# Patient Record
Sex: MALE | Race: WHITE | NOT HISPANIC OR LATINO
[De-identification: names, ages, dates, MRNs, and addresses within clinical notes are randomized per-mention and may not be internally consistent; named-entity substitution may affect disease eponyms.]

---

## 2017-03-09 ENCOUNTER — APPOINTMENT (OUTPATIENT)
Dept: UROLOGY | Facility: CLINIC | Age: 77
End: 2017-03-09

## 2017-10-31 ENCOUNTER — APPOINTMENT (OUTPATIENT)
Dept: UROLOGY | Facility: CLINIC | Age: 77
End: 2017-10-31
Payer: MEDICARE

## 2017-10-31 DIAGNOSIS — N52.9 MALE ERECTILE DYSFUNCTION, UNSPECIFIED: ICD-10-CM

## 2017-10-31 DIAGNOSIS — N40.1 BENIGN PROSTATIC HYPERPLASIA WITH LOWER URINARY TRACT SYMPMS: ICD-10-CM

## 2017-10-31 DIAGNOSIS — N13.8 BENIGN PROSTATIC HYPERPLASIA WITH LOWER URINARY TRACT SYMPMS: ICD-10-CM

## 2017-10-31 DIAGNOSIS — C65.9 MALIGNANT NEOPLASM OF UNSPECIFIED RENAL PELVIS: ICD-10-CM

## 2017-10-31 DIAGNOSIS — C67.9 MALIGNANT NEOPLASM OF BLADDER, UNSPECIFIED: ICD-10-CM

## 2017-10-31 PROCEDURE — 99214 OFFICE O/P EST MOD 30 MIN: CPT

## 2017-11-01 LAB
APPEARANCE: CLEAR
BACTERIA: NEGATIVE
BILIRUBIN URINE: NEGATIVE
BLOOD URINE: NEGATIVE
COLOR: YELLOW
GLUCOSE QUALITATIVE U: NEGATIVE MG/DL
KETONES URINE: NEGATIVE
LEUKOCYTE ESTERASE URINE: NEGATIVE
MICROSCOPIC-UA: NORMAL
NITRITE URINE: NEGATIVE
PH URINE: 6.5
PROTEIN URINE: NEGATIVE MG/DL
PSA FREE FLD-MCNC: 50
PSA FREE SERPL-MCNC: 0.2 NG/ML
PSA SERPL-MCNC: 0.4 NG/ML
RED BLOOD CELLS URINE: 2 /HPF
SPECIFIC GRAVITY URINE: 1.01
SQUAMOUS EPITHELIAL CELLS: 0 /HPF
UROBILINOGEN URINE: NEGATIVE MG/DL
WHITE BLOOD CELLS URINE: 0 /HPF

## 2017-11-03 LAB — CORE LAB FLUID CYTOLOGY: NORMAL

## 2017-11-30 ENCOUNTER — APPOINTMENT (OUTPATIENT)
Dept: VASCULAR SURGERY | Facility: CLINIC | Age: 77
End: 2017-11-30
Payer: MEDICARE

## 2017-11-30 VITALS
WEIGHT: 162 LBS | TEMPERATURE: 98 F | HEART RATE: 51 BPM | HEIGHT: 67.5 IN | DIASTOLIC BLOOD PRESSURE: 85 MMHG | SYSTOLIC BLOOD PRESSURE: 146 MMHG | BODY MASS INDEX: 25.13 KG/M2

## 2017-11-30 DIAGNOSIS — Z87.891 PERSONAL HISTORY OF NICOTINE DEPENDENCE: ICD-10-CM

## 2017-11-30 PROCEDURE — 99204 OFFICE O/P NEW MOD 45 MIN: CPT

## 2017-12-04 ENCOUNTER — FORM ENCOUNTER (OUTPATIENT)
Age: 77
End: 2017-12-04

## 2017-12-05 ENCOUNTER — OUTPATIENT (OUTPATIENT)
Dept: OUTPATIENT SERVICES | Facility: HOSPITAL | Age: 77
LOS: 1 days | End: 2017-12-05
Payer: MEDICARE

## 2017-12-05 ENCOUNTER — APPOINTMENT (OUTPATIENT)
Dept: CT IMAGING | Facility: CLINIC | Age: 77
End: 2017-12-05
Payer: MEDICARE

## 2017-12-05 DIAGNOSIS — Z00.8 ENCOUNTER FOR OTHER GENERAL EXAMINATION: ICD-10-CM

## 2017-12-05 DIAGNOSIS — Z98.89 OTHER SPECIFIED POSTPROCEDURAL STATES: Chronic | ICD-10-CM

## 2017-12-05 PROCEDURE — 74174 CTA ABD&PLVS W/CONTRAST: CPT | Mod: 26

## 2017-12-05 PROCEDURE — 74174 CTA ABD&PLVS W/CONTRAST: CPT

## 2017-12-06 ENCOUNTER — OTHER (OUTPATIENT)
Age: 77
End: 2017-12-06

## 2018-01-22 ENCOUNTER — OUTPATIENT (OUTPATIENT)
Dept: OUTPATIENT SERVICES | Facility: HOSPITAL | Age: 78
LOS: 1 days | End: 2018-01-22
Payer: MEDICARE

## 2018-01-22 VITALS
HEART RATE: 65 BPM | OXYGEN SATURATION: 99 % | RESPIRATION RATE: 15 BRPM | SYSTOLIC BLOOD PRESSURE: 132 MMHG | TEMPERATURE: 98 F | WEIGHT: 161.16 LBS | DIASTOLIC BLOOD PRESSURE: 83 MMHG | HEIGHT: 67.5 IN

## 2018-01-22 DIAGNOSIS — G47.33 OBSTRUCTIVE SLEEP APNEA (ADULT) (PEDIATRIC): ICD-10-CM

## 2018-01-22 DIAGNOSIS — Z98.89 OTHER SPECIFIED POSTPROCEDURAL STATES: Chronic | ICD-10-CM

## 2018-01-22 DIAGNOSIS — I71.4 ABDOMINAL AORTIC ANEURYSM, WITHOUT RUPTURE: ICD-10-CM

## 2018-01-22 DIAGNOSIS — Z79.82 LONG TERM (CURRENT) USE OF ASPIRIN: ICD-10-CM

## 2018-01-22 DIAGNOSIS — Z01.818 ENCOUNTER FOR OTHER PREPROCEDURAL EXAMINATION: ICD-10-CM

## 2018-01-22 LAB
ANION GAP SERPL CALC-SCNC: 13 MMOL/L — SIGNIFICANT CHANGE UP (ref 5–17)
BLD GP AB SCN SERPL QL: NEGATIVE — SIGNIFICANT CHANGE UP
BUN SERPL-MCNC: 24 MG/DL — HIGH (ref 7–23)
CALCIUM SERPL-MCNC: 10.1 MG/DL — SIGNIFICANT CHANGE UP (ref 8.4–10.5)
CHLORIDE SERPL-SCNC: 99 MMOL/L — SIGNIFICANT CHANGE UP (ref 96–108)
CO2 SERPL-SCNC: 27 MMOL/L — SIGNIFICANT CHANGE UP (ref 22–31)
CREAT SERPL-MCNC: 1.35 MG/DL — HIGH (ref 0.5–1.3)
GLUCOSE SERPL-MCNC: 96 MG/DL — SIGNIFICANT CHANGE UP (ref 70–99)
HCT VFR BLD CALC: 42.4 % — SIGNIFICANT CHANGE UP (ref 39–50)
HGB BLD-MCNC: 14.3 G/DL — SIGNIFICANT CHANGE UP (ref 13–17)
MCHC RBC-ENTMCNC: 31.7 PG — SIGNIFICANT CHANGE UP (ref 27–34)
MCHC RBC-ENTMCNC: 33.7 GM/DL — SIGNIFICANT CHANGE UP (ref 32–36)
MCV RBC AUTO: 94 FL — SIGNIFICANT CHANGE UP (ref 80–100)
PLATELET # BLD AUTO: 218 K/UL — SIGNIFICANT CHANGE UP (ref 150–400)
POTASSIUM SERPL-MCNC: 4.8 MMOL/L — SIGNIFICANT CHANGE UP (ref 3.5–5.3)
POTASSIUM SERPL-SCNC: 4.8 MMOL/L — SIGNIFICANT CHANGE UP (ref 3.5–5.3)
RBC # BLD: 4.51 M/UL — SIGNIFICANT CHANGE UP (ref 4.2–5.8)
RBC # FLD: 13 % — SIGNIFICANT CHANGE UP (ref 10.3–14.5)
RH IG SCN BLD-IMP: POSITIVE — SIGNIFICANT CHANGE UP
SODIUM SERPL-SCNC: 139 MMOL/L — SIGNIFICANT CHANGE UP (ref 135–145)
WBC # BLD: 8.78 K/UL — SIGNIFICANT CHANGE UP (ref 3.8–10.5)
WBC # FLD AUTO: 8.78 K/UL — SIGNIFICANT CHANGE UP (ref 3.8–10.5)

## 2018-01-22 PROCEDURE — 86850 RBC ANTIBODY SCREEN: CPT

## 2018-01-22 PROCEDURE — 86901 BLOOD TYPING SEROLOGIC RH(D): CPT

## 2018-01-22 PROCEDURE — 93005 ELECTROCARDIOGRAM TRACING: CPT

## 2018-01-22 PROCEDURE — 85027 COMPLETE CBC AUTOMATED: CPT

## 2018-01-22 PROCEDURE — G0463: CPT

## 2018-01-22 PROCEDURE — 93010 ELECTROCARDIOGRAM REPORT: CPT

## 2018-01-22 PROCEDURE — 80048 BASIC METABOLIC PNL TOTAL CA: CPT

## 2018-01-22 PROCEDURE — 86900 BLOOD TYPING SEROLOGIC ABO: CPT

## 2018-01-22 NOTE — H&P PST ADULT - NSANTHOSAYNRD_GEN_A_CORE
No. PATSY screening performed.  STOP BANG Legend: 0-2 = LOW Risk; 3-4 = INTERMEDIATE Risk; 5-8 = HIGH Risk

## 2018-01-22 NOTE — H&P PST ADULT - PSH
History of cystoscopy  April 2015  History of Lt  Nephrectomy  6/10 - left  S/P Cystoscopy  bladder polyps removal multiple times (since 1999), 6/10 , 10/2011 & 10/17/2011, 5/12, 11/2012, last 5/13/13, 12/2013, 7/2014  S/P Tonsillectomy    urethral Stent 7/2008  stent placement and removal

## 2018-01-22 NOTE — H&P PST ADULT - PMH
Abdominal aortic aneurysm (AAA)    Bladder Cancer (ICD9 188.9)  TCC, dx 1999  BPH (benign prostatic hyperplasia)    Heel spur, left    HTN  dx 2008  Hx antineoplastic chemotherapy (BCG 2012)    Hyperlipemia (ICD9 272.4)  dx 2008  Left bundle branch block    Lt Renal Neoplasm  left  Vertigo Abdominal aortic aneurysm (AAA)  5.5 cm  Bladder Cancer (ICD9 188.9)  TCC, dx 1999  BPH (benign prostatic hyperplasia)    Heel spur, left    HTN  dx 2008  Hx antineoplastic chemotherapy (BCG 2012)    Hyperlipemia (ICD9 272.4)  dx 2008  Left bundle branch block    Lt Renal Neoplasm  left - s/p left nephrectomy  Renal mass, right  current - following with Dr Pamela Gaffney

## 2018-01-22 NOTE — H&P PST ADULT - HISTORY OF PRESENT ILLNESS
76 yo male with h/o HTN, HLD, renal neoplasm s/p left nephrectomy and known AAA, which increased in size from approximately 4.5 cm 9 months ago to 5.5 cm. There was an incidental finding of a right renal neoplasm on imaging done to evaluate AAA. Pt is scheduled for endovascular repair of AAA on 1/29/18. He is following up with Dr Santiago for evaluation of right renal mass. Pt denies back or abdominal pain.

## 2018-01-28 ENCOUNTER — OUTPATIENT (OUTPATIENT)
Dept: OUTPATIENT SERVICES | Facility: HOSPITAL | Age: 78
LOS: 1 days | End: 2018-01-28
Payer: MEDICARE

## 2018-01-28 DIAGNOSIS — Z98.89 OTHER SPECIFIED POSTPROCEDURAL STATES: Chronic | ICD-10-CM

## 2018-01-29 ENCOUNTER — APPOINTMENT (OUTPATIENT)
Dept: VASCULAR SURGERY | Facility: HOSPITAL | Age: 78
End: 2018-01-29

## 2018-01-29 ENCOUNTER — TRANSCRIPTION ENCOUNTER (OUTPATIENT)
Age: 78
End: 2018-01-29

## 2018-01-29 ENCOUNTER — INPATIENT (INPATIENT)
Facility: HOSPITAL | Age: 78
LOS: 1 days | Discharge: HOME | DRG: 269 | End: 2018-01-31
Attending: SURGERY | Admitting: SURGERY
Payer: MEDICARE

## 2018-01-29 VITALS
HEIGHT: 67.5 IN | SYSTOLIC BLOOD PRESSURE: 147 MMHG | TEMPERATURE: 98 F | WEIGHT: 160.94 LBS | OXYGEN SATURATION: 98 % | RESPIRATION RATE: 18 BRPM | DIASTOLIC BLOOD PRESSURE: 80 MMHG | HEART RATE: 57 BPM

## 2018-01-29 DIAGNOSIS — I71.4 ABDOMINAL AORTIC ANEURYSM, WITHOUT RUPTURE: ICD-10-CM

## 2018-01-29 DIAGNOSIS — Z98.89 OTHER SPECIFIED POSTPROCEDURAL STATES: Chronic | ICD-10-CM

## 2018-01-29 LAB
HCT VFR BLD CALC: 32.2 % — LOW (ref 39–50)
HGB BLD-MCNC: 11.8 G/DL — LOW (ref 13–17)
MCHC RBC-ENTMCNC: 34.4 PG — HIGH (ref 27–34)
MCHC RBC-ENTMCNC: 36.7 GM/DL — HIGH (ref 32–36)
MCV RBC AUTO: 93.8 FL — SIGNIFICANT CHANGE UP (ref 80–100)
PLATELET # BLD AUTO: 156 K/UL — SIGNIFICANT CHANGE UP (ref 150–400)
RBC # BLD: 3.43 M/UL — LOW (ref 4.2–5.8)
RBC # FLD: 11.3 % — SIGNIFICANT CHANGE UP (ref 10.3–14.5)
WBC # BLD: 9.3 K/UL — SIGNIFICANT CHANGE UP (ref 3.8–10.5)
WBC # FLD AUTO: 9.3 K/UL — SIGNIFICANT CHANGE UP (ref 3.8–10.5)

## 2018-01-29 PROCEDURE — 34709 PLMT XTN PROSTH EVASC RPR: CPT

## 2018-01-29 PROCEDURE — 34812 OPN FEM ART EXPOS: CPT | Mod: 59

## 2018-01-29 PROCEDURE — 37221: CPT | Mod: 59

## 2018-01-29 PROCEDURE — 34705 EVAC RPR A-BIILIAC NDGFT: CPT

## 2018-01-29 RX ORDER — SODIUM CHLORIDE 9 MG/ML
3 INJECTION INTRAMUSCULAR; INTRAVENOUS; SUBCUTANEOUS EVERY 8 HOURS
Qty: 0 | Refills: 0 | Status: DISCONTINUED | OUTPATIENT
Start: 2018-01-29 | End: 2018-01-29

## 2018-01-29 RX ORDER — ONDANSETRON 8 MG/1
4 TABLET, FILM COATED ORAL
Qty: 0 | Refills: 0 | Status: DISCONTINUED | OUTPATIENT
Start: 2018-01-29 | End: 2018-01-30

## 2018-01-29 RX ORDER — LIDOCAINE HCL 20 MG/ML
0.2 VIAL (ML) INJECTION ONCE
Qty: 0 | Refills: 0 | Status: DISCONTINUED | OUTPATIENT
Start: 2018-01-29 | End: 2018-01-29

## 2018-01-29 RX ORDER — METOPROLOL TARTRATE 50 MG
100 TABLET ORAL
Qty: 0 | Refills: 0 | Status: DISCONTINUED | OUTPATIENT
Start: 2018-01-29 | End: 2018-01-31

## 2018-01-29 RX ORDER — ESCITALOPRAM OXALATE 10 MG/1
10 TABLET, FILM COATED ORAL DAILY
Qty: 0 | Refills: 0 | Status: DISCONTINUED | OUTPATIENT
Start: 2018-01-29 | End: 2018-01-31

## 2018-01-29 RX ORDER — HYDROMORPHONE HYDROCHLORIDE 2 MG/ML
0.5 INJECTION INTRAMUSCULAR; INTRAVENOUS; SUBCUTANEOUS
Qty: 0 | Refills: 0 | Status: DISCONTINUED | OUTPATIENT
Start: 2018-01-29 | End: 2018-01-30

## 2018-01-29 RX ORDER — ACETAMINOPHEN 500 MG
1000 TABLET ORAL ONCE
Qty: 0 | Refills: 0 | Status: COMPLETED | OUTPATIENT
Start: 2018-01-29 | End: 2018-01-29

## 2018-01-29 RX ORDER — LOSARTAN POTASSIUM 100 MG/1
100 TABLET, FILM COATED ORAL DAILY
Qty: 0 | Refills: 0 | Status: DISCONTINUED | OUTPATIENT
Start: 2018-01-29 | End: 2018-01-31

## 2018-01-29 RX ORDER — HEPARIN SODIUM 5000 [USP'U]/ML
5000 INJECTION INTRAVENOUS; SUBCUTANEOUS EVERY 8 HOURS
Qty: 0 | Refills: 0 | Status: DISCONTINUED | OUTPATIENT
Start: 2018-01-29 | End: 2018-01-31

## 2018-01-29 RX ORDER — ACETAMINOPHEN 500 MG
1000 TABLET ORAL ONCE
Qty: 0 | Refills: 0 | Status: COMPLETED | OUTPATIENT
Start: 2018-01-29 | End: 2018-01-30

## 2018-01-29 RX ORDER — OXYCODONE AND ACETAMINOPHEN 5; 325 MG/1; MG/1
1 TABLET ORAL EVERY 4 HOURS
Qty: 0 | Refills: 0 | Status: DISCONTINUED | OUTPATIENT
Start: 2018-01-29 | End: 2018-01-31

## 2018-01-29 RX ORDER — ATORVASTATIN CALCIUM 80 MG/1
20 TABLET, FILM COATED ORAL AT BEDTIME
Qty: 0 | Refills: 0 | Status: DISCONTINUED | OUTPATIENT
Start: 2018-01-29 | End: 2018-01-31

## 2018-01-29 RX ORDER — DEXTROSE MONOHYDRATE, SODIUM CHLORIDE, AND POTASSIUM CHLORIDE 50; .745; 4.5 G/1000ML; G/1000ML; G/1000ML
1000 INJECTION, SOLUTION INTRAVENOUS
Qty: 0 | Refills: 0 | Status: DISCONTINUED | OUTPATIENT
Start: 2018-01-29 | End: 2018-01-31

## 2018-01-29 RX ADMIN — DEXTROSE MONOHYDRATE, SODIUM CHLORIDE, AND POTASSIUM CHLORIDE 80 MILLILITER(S): 50; .745; 4.5 INJECTION, SOLUTION INTRAVENOUS at 17:30

## 2018-01-29 RX ADMIN — Medication 1000 MILLIGRAM(S): at 16:30

## 2018-01-29 RX ADMIN — HYDROMORPHONE HYDROCHLORIDE 0.5 MILLIGRAM(S): 2 INJECTION INTRAMUSCULAR; INTRAVENOUS; SUBCUTANEOUS at 15:04

## 2018-01-29 RX ADMIN — HYDROMORPHONE HYDROCHLORIDE 0.5 MILLIGRAM(S): 2 INJECTION INTRAMUSCULAR; INTRAVENOUS; SUBCUTANEOUS at 18:00

## 2018-01-29 RX ADMIN — ATORVASTATIN CALCIUM 20 MILLIGRAM(S): 80 TABLET, FILM COATED ORAL at 22:08

## 2018-01-29 RX ADMIN — HYDROMORPHONE HYDROCHLORIDE 0.5 MILLIGRAM(S): 2 INJECTION INTRAMUSCULAR; INTRAVENOUS; SUBCUTANEOUS at 18:30

## 2018-01-29 RX ADMIN — ONDANSETRON 4 MILLIGRAM(S): 8 TABLET, FILM COATED ORAL at 22:37

## 2018-01-29 RX ADMIN — Medication 400 MILLIGRAM(S): at 15:59

## 2018-01-29 RX ADMIN — HEPARIN SODIUM 5000 UNIT(S): 5000 INJECTION INTRAVENOUS; SUBCUTANEOUS at 22:08

## 2018-01-29 RX ADMIN — HYDROMORPHONE HYDROCHLORIDE 0.5 MILLIGRAM(S): 2 INJECTION INTRAMUSCULAR; INTRAVENOUS; SUBCUTANEOUS at 20:29

## 2018-01-29 RX ADMIN — HYDROMORPHONE HYDROCHLORIDE 0.5 MILLIGRAM(S): 2 INJECTION INTRAMUSCULAR; INTRAVENOUS; SUBCUTANEOUS at 15:34

## 2018-01-29 NOTE — CHART NOTE - NSCHARTNOTEFT_GEN_A_CORE
Vascular Surgery 6 hour Re-eval    Procedure: endovascular AAA repair    SUBJECTIVE: Pt seen and examined in PACU. Continues to endorse soreness, which improves with pain medications, however states that he has nausea and emesis 2/2 iv pain meds.  SOB:  [ ] YES [x ] NO  Chest Discomfort: [ ] YES [x ] NO    Nausea: [x ] YES [ ] NO           Vomiting: [x ] YES [ ] NO  Flatus: [ ] YES [x ] NO             Bowel Movement: [ ] YES [x ] NO  Void: [x ]YES [ ]No         Pain Control Adequate: [ x] YES [ ] NO        Vital Signs Last 24 Hrs  T(C): 36.3 (29 Jan 2018 16:30), Max: 37 (29 Jan 2018 14:30)  T(F): 97.3 (29 Jan 2018 16:30), Max: 98.6 (29 Jan 2018 14:30)  HR: 67 (29 Jan 2018 20:30) (57 - 79)  BP: 116/61 (29 Jan 2018 20:00) (87/54 - 147/80)  BP(mean): 81 (29 Jan 2018 20:00) (63 - 86)  RR: 10 (29 Jan 2018 20:30) (9 - 22)  SpO2: 99% (29 Jan 2018 20:30) (92% - 100%)  I&O's Summary    29 Jan 2018 07:01  -  29 Jan 2018 21:32  --------------------------------------------------------  IN: 550 mL / OUT: 440 mL / NET: 110 mL      I&O's Detail    29 Jan 2018 07:01  -  29 Jan 2018 21:32  --------------------------------------------------------  IN:    Oral Fluid: 150 mL    sodium chloride 0.9% with potassium chloride 20 mEq/L: 400 mL  Total IN: 550 mL    OUT:    Emesis: 50 mL    Indwelling Catheter - Urethral: 390 mL  Total OUT: 440 mL    Total NET: 110 mL          MEDICATIONS  (STANDING):  atorvastatin 20 milliGRAM(s) Oral at bedtime  clindamycin IVPB 900 milliGRAM(s) IV Intermittent once  escitalopram 10 milliGRAM(s) Oral daily  heparin  Injectable 5000 Unit(s) SubCutaneous every 8 hours  losartan 100 milliGRAM(s) Oral daily  metoprolol     tartrate 100 milliGRAM(s) Oral two times a day  sodium chloride 0.9% with potassium chloride 20 mEq/L 1000 milliLiter(s) (80 mL/Hr) IV Continuous <Continuous>    MEDICATIONS  (PRN):  HYDROmorphone  Injectable 0.5 milliGRAM(s) IV Push every 10 minutes PRN Moderate Pain (4 - 6)  ondansetron Injectable 4 milliGRAM(s) IV Push two times a day PRN Nausea and/or Vomiting  oxyCODONE    5 mG/acetaminophen 325 mG 1 Tablet(s) Oral every 4 hours PRN Moderate Pain      LABS:                        11.8   9.3   )-----------( 156      ( 29 Jan 2018 15:40 )             32.2           Physical exam  General: NAD, A& O X3  Abdomen: soft, NT, ND  b/l groin: soft, no hematoma or active bleeding, dressings unchanged saturated serosanguinous  Ext: warm, soft compartments, sensation/motor intact, dopp R DP, dopp L PT      A/P: 77y Male POD 0 s/p EVAR  - zofran prn  - Diet: regular  - Activity- OOB ambulate   - AM labs   - Pain medication  - DVT ppx  - incentive spirometry   - OOB2C on POD 1  - keep dressings in place for 24 hours  - rafale d/daily after 24 hours  - ok to transfer from PACU to floor

## 2018-01-29 NOTE — PATIENT PROFILE ADULT. - PMH
Abdominal aortic aneurysm (AAA)  5.5 cm  Bladder Cancer (ICD9 188.9)  TCC, dx 1999  BPH (benign prostatic hyperplasia)    Heel spur, left    HTN  dx 2008  Hx antineoplastic chemotherapy (BCG 2012)    Hyperlipemia (ICD9 272.4)  dx 2008  Left bundle branch block    Lt Renal Neoplasm  left - s/p left nephrectomy  Renal mass, right  current - following with Dr Pamela Gaffney

## 2018-01-29 NOTE — CHART NOTE - NSCHARTNOTEFT_GEN_A_CORE
Vascular Surgery Post op Note    Procedure: endovascular AAA repair    SUBJECTIVE: Pt seen and examined at bedside in PACU.  Pt admits to soreness at b/l groin incision sites. No other complaints.  SOB:  [ ] YES [x ] NO  Chest Discomfort: [ ] YES [x ] NO    Nausea: [ ] YES [x] NO           Vomiting: [ ] YES [x ] NO  Flatus: [ ] YES [x ] NO             Bowel Movement: [ ] YES [ x] NO  Void: [ x]YES [ ]No         Pain Control Adequate: [x ] YES [ ] NO        Vital Signs Last 24 Hrs  T(C): 36.3 (29 Jan 2018 16:30), Max: 37 (29 Jan 2018 14:30)  T(F): 97.3 (29 Jan 2018 16:30), Max: 98.6 (29 Jan 2018 14:30)  HR: 72 (29 Jan 2018 18:30) (57 - 72)  BP: 140/67 (29 Jan 2018 18:30) (87/54 - 147/80)  BP(mean): 63 (29 Jan 2018 15:30) (63 - 86)  RR: 14 (29 Jan 2018 18:30) (12 - 18)  SpO2: 95% (29 Jan 2018 18:30) (95% - 100%)  I&O's Summary    29 Jan 2018 07:01  -  29 Jan 2018 19:22  --------------------------------------------------------  IN: 320 mL / OUT: 390 mL / NET: -70 mL      I&O's Detail    29 Jan 2018 07:01  -  29 Jan 2018 19:22  --------------------------------------------------------  IN:    sodium chloride 0.9% with potassium chloride 20 mEq/L: 320 mL  Total IN: 320 mL    OUT:    Indwelling Catheter - Urethral: 390 mL  Total OUT: 390 mL    Total NET: -70 mL          MEDICATIONS  (STANDING):  atorvastatin 20 milliGRAM(s) Oral at bedtime  clindamycin IVPB 900 milliGRAM(s) IV Intermittent once  escitalopram 10 milliGRAM(s) Oral daily  heparin  Injectable 5000 Unit(s) SubCutaneous every 8 hours  losartan 100 milliGRAM(s) Oral daily  metoprolol     tartrate 100 milliGRAM(s) Oral two times a day  sodium chloride 0.9% with potassium chloride 20 mEq/L 1000 milliLiter(s) (80 mL/Hr) IV Continuous <Continuous>    MEDICATIONS  (PRN):  HYDROmorphone  Injectable 0.5 milliGRAM(s) IV Push every 10 minutes PRN Moderate Pain (4 - 6)  ondansetron Injectable 4 milliGRAM(s) IV Push two times a day PRN Nausea and/or Vomiting  oxyCODONE    5 mG/acetaminophen 325 mG 1 Tablet(s) Oral every 4 hours PRN Moderate Pain      LABS:                        11.8   9.3   )-----------( 156      ( 29 Jan 2018 15:40 )             32.2             Physical exam  General: NAD, A& O X3  Abdomen: soft, NT, ND  b/l groin: soft, no hematoma or active bleeding, dressings saturated serosanguinous  Ext: warm, soft compartments, sensation/motor intact, dopp R DP, faint dopp L PT      A/P: 77y Male POD 0 s/p endovascular AAA repair  - Diet: regular  - Activity- OOB ambulate   - AM labs  - Pain medication  - DVT ppx  - incentive spirometry   - OOB2C on POD 1  - keep dressings in place for 24 hours  - hall d/c after 24 hours  - re-eval in PACU in 2 hours

## 2018-01-30 ENCOUNTER — TRANSCRIPTION ENCOUNTER (OUTPATIENT)
Age: 78
End: 2018-01-30

## 2018-01-30 LAB
ANION GAP SERPL CALC-SCNC: 10 MMOL/L — SIGNIFICANT CHANGE UP (ref 5–17)
BASOPHILS # BLD AUTO: 0 K/UL — SIGNIFICANT CHANGE UP (ref 0–0.2)
BASOPHILS NFR BLD AUTO: 0.1 % — SIGNIFICANT CHANGE UP (ref 0–2)
BUN SERPL-MCNC: 22 MG/DL — SIGNIFICANT CHANGE UP (ref 7–23)
CALCIUM SERPL-MCNC: 8.3 MG/DL — LOW (ref 8.4–10.5)
CHLORIDE SERPL-SCNC: 107 MMOL/L — SIGNIFICANT CHANGE UP (ref 96–108)
CO2 SERPL-SCNC: 23 MMOL/L — SIGNIFICANT CHANGE UP (ref 22–31)
CREAT SERPL-MCNC: 1.22 MG/DL — SIGNIFICANT CHANGE UP (ref 0.5–1.3)
EOSINOPHIL # BLD AUTO: 0 K/UL — SIGNIFICANT CHANGE UP (ref 0–0.5)
EOSINOPHIL NFR BLD AUTO: 0.2 % — SIGNIFICANT CHANGE UP (ref 0–6)
GLUCOSE SERPL-MCNC: 129 MG/DL — HIGH (ref 70–99)
HCT VFR BLD CALC: 31.5 % — LOW (ref 39–50)
HGB BLD-MCNC: 11.7 G/DL — LOW (ref 13–17)
LYMPHOCYTES # BLD AUTO: 0.9 K/UL — LOW (ref 1–3.3)
LYMPHOCYTES # BLD AUTO: 8.6 % — LOW (ref 13–44)
MCHC RBC-ENTMCNC: 34.7 PG — HIGH (ref 27–34)
MCHC RBC-ENTMCNC: 37 GM/DL — HIGH (ref 32–36)
MCV RBC AUTO: 93.8 FL — SIGNIFICANT CHANGE UP (ref 80–100)
MONOCYTES # BLD AUTO: 0.9 K/UL — SIGNIFICANT CHANGE UP (ref 0–0.9)
MONOCYTES NFR BLD AUTO: 8.8 % — SIGNIFICANT CHANGE UP (ref 2–14)
NEUTROPHILS # BLD AUTO: 8.3 K/UL — HIGH (ref 1.8–7.4)
NEUTROPHILS NFR BLD AUTO: 82.4 % — HIGH (ref 43–77)
PLATELET # BLD AUTO: 163 K/UL — SIGNIFICANT CHANGE UP (ref 150–400)
POTASSIUM SERPL-MCNC: 4.8 MMOL/L — SIGNIFICANT CHANGE UP (ref 3.5–5.3)
POTASSIUM SERPL-SCNC: 4.8 MMOL/L — SIGNIFICANT CHANGE UP (ref 3.5–5.3)
RBC # BLD: 3.36 M/UL — LOW (ref 4.2–5.8)
RBC # FLD: 11.3 % — SIGNIFICANT CHANGE UP (ref 10.3–14.5)
SODIUM SERPL-SCNC: 140 MMOL/L — SIGNIFICANT CHANGE UP (ref 135–145)
WBC # BLD: 10.1 K/UL — SIGNIFICANT CHANGE UP (ref 3.8–10.5)
WBC # FLD AUTO: 10.1 K/UL — SIGNIFICANT CHANGE UP (ref 3.8–10.5)

## 2018-01-30 RX ORDER — OXYCODONE AND ACETAMINOPHEN 5; 325 MG/1; MG/1
1 TABLET ORAL ONCE
Qty: 0 | Refills: 0 | Status: DISCONTINUED | OUTPATIENT
Start: 2018-01-30 | End: 2018-01-30

## 2018-01-30 RX ORDER — SENNA PLUS 8.6 MG/1
2 TABLET ORAL ONCE
Qty: 0 | Refills: 0 | Status: COMPLETED | OUTPATIENT
Start: 2018-01-30 | End: 2018-01-30

## 2018-01-30 RX ORDER — DIAZEPAM 5 MG
10 TABLET ORAL
Qty: 0 | Refills: 0 | Status: DISCONTINUED | OUTPATIENT
Start: 2018-01-30 | End: 2018-01-31

## 2018-01-30 RX ORDER — SODIUM CHLORIDE 9 MG/ML
1000 INJECTION INTRAMUSCULAR; INTRAVENOUS; SUBCUTANEOUS ONCE
Qty: 0 | Refills: 0 | Status: COMPLETED | OUTPATIENT
Start: 2018-01-30 | End: 2018-01-30

## 2018-01-30 RX ADMIN — HEPARIN SODIUM 5000 UNIT(S): 5000 INJECTION INTRAVENOUS; SUBCUTANEOUS at 13:01

## 2018-01-30 RX ADMIN — DEXTROSE MONOHYDRATE, SODIUM CHLORIDE, AND POTASSIUM CHLORIDE 80 MILLILITER(S): 50; .745; 4.5 INJECTION, SOLUTION INTRAVENOUS at 15:34

## 2018-01-30 RX ADMIN — OXYCODONE AND ACETAMINOPHEN 1 TABLET(S): 5; 325 TABLET ORAL at 13:30

## 2018-01-30 RX ADMIN — HEPARIN SODIUM 5000 UNIT(S): 5000 INJECTION INTRAVENOUS; SUBCUTANEOUS at 05:10

## 2018-01-30 RX ADMIN — OXYCODONE AND ACETAMINOPHEN 1 TABLET(S): 5; 325 TABLET ORAL at 22:56

## 2018-01-30 RX ADMIN — Medication 1000 MILLIGRAM(S): at 02:15

## 2018-01-30 RX ADMIN — ESCITALOPRAM OXALATE 10 MILLIGRAM(S): 10 TABLET, FILM COATED ORAL at 12:57

## 2018-01-30 RX ADMIN — HEPARIN SODIUM 5000 UNIT(S): 5000 INJECTION INTRAVENOUS; SUBCUTANEOUS at 21:43

## 2018-01-30 RX ADMIN — Medication 10 MILLIGRAM(S): at 18:03

## 2018-01-30 RX ADMIN — ATORVASTATIN CALCIUM 20 MILLIGRAM(S): 80 TABLET, FILM COATED ORAL at 21:43

## 2018-01-30 RX ADMIN — SODIUM CHLORIDE 1000 MILLILITER(S): 9 INJECTION INTRAMUSCULAR; INTRAVENOUS; SUBCUTANEOUS at 18:48

## 2018-01-30 RX ADMIN — DEXTROSE MONOHYDRATE, SODIUM CHLORIDE, AND POTASSIUM CHLORIDE 80 MILLILITER(S): 50; .745; 4.5 INJECTION, SOLUTION INTRAVENOUS at 04:15

## 2018-01-30 RX ADMIN — Medication 100 MILLIGRAM(S): at 17:46

## 2018-01-30 RX ADMIN — OXYCODONE AND ACETAMINOPHEN 1 TABLET(S): 5; 325 TABLET ORAL at 12:57

## 2018-01-30 RX ADMIN — OXYCODONE AND ACETAMINOPHEN 1 TABLET(S): 5; 325 TABLET ORAL at 10:30

## 2018-01-30 RX ADMIN — OXYCODONE AND ACETAMINOPHEN 1 TABLET(S): 5; 325 TABLET ORAL at 23:30

## 2018-01-30 RX ADMIN — OXYCODONE AND ACETAMINOPHEN 1 TABLET(S): 5; 325 TABLET ORAL at 09:42

## 2018-01-30 RX ADMIN — Medication 400 MILLIGRAM(S): at 02:00

## 2018-01-30 RX ADMIN — Medication 100 MILLIGRAM(S): at 05:10

## 2018-01-30 RX ADMIN — LOSARTAN POTASSIUM 100 MILLIGRAM(S): 100 TABLET, FILM COATED ORAL at 05:10

## 2018-01-30 RX ADMIN — SENNA PLUS 2 TABLET(S): 8.6 TABLET ORAL at 15:33

## 2018-01-30 NOTE — DISCHARGE NOTE ADULT - CARE PLAN
Principal Discharge DX:	Abdominal aortic aneurysm (AAA) without rupture  Goal:	Wound healing  Secondary Diagnosis:	BPH (benign prostatic hyperplasia) Principal Discharge DX:	Abdominal aortic aneurysm (AAA) without rupture  Goal:	Wound healing  Assessment and plan of treatment:	Please call for follow-up appointment with Dr. Young in two weeks. Continue your regular diet, current medications, activity as tolerated.  Secondary Diagnosis:	BPH (benign prostatic hyperplasia)  Assessment and plan of treatment:	Please call for follow-up appointment with your urologist, Dr. Calderon. Continue your regular diet, current medications, activity as tolerated. Principal Discharge DX:	Abdominal aortic aneurysm (AAA) without rupture  Goal:	Wound healing  Assessment and plan of treatment:	Please call for follow-up appointment with Dr. Young in two weeks. Continue your regular diet, current medications, activity as tolerated. Return to ER for temperatures greater than 101, chills sweats, pain not controlled with pain medications, persistent nausea and vomiting, swelling that continues; unable to urinate; numbness, tingling or acutely concerning matters to you, that may require urgent medical attention.  Secondary Diagnosis:	BPH (benign prostatic hyperplasia)  Goal:	continue current medications for BPH  Assessment and plan of treatment:	Please call for follow-up appointment with your urologist, Dr. Calderon. Continue your regular diet, current medications, activity as tolerated.

## 2018-01-30 NOTE — DISCHARGE NOTE ADULT - FINDINGS/TREATMENT
Endovascular repair of abdominal aortic aneurysm with bifurcated Endurant stent graft system 32 x 14 x 103, placement of right iliac extension piece 16 x 13 x 124, bilateral femoral artery exposures with repair, angioplasty and stenting of left common iliac artery with 10 mm x 57mm balloon expandable bare metal stent.

## 2018-01-30 NOTE — DISCHARGE NOTE ADULT - PATIENT PORTAL LINK FT
“You can access the [facility] Patient Portal, offered by [facility], by registering with the following website: [email or web address]”

## 2018-01-30 NOTE — DISCHARGE NOTE ADULT - PLAN OF CARE
Wound healing Please call for follow-up appointment with Dr. Young in two weeks. Continue your regular diet, current medications, activity as tolerated. Please call for follow-up appointment with your urologist, Dr. Calderon. Continue your regular diet, current medications, activity as tolerated. Please call for follow-up appointment with Dr. Young in two weeks. Continue your regular diet, current medications, activity as tolerated. Return to ER for temperatures greater than 101, chills sweats, pain not controlled with pain medications, persistent nausea and vomiting, swelling that continues; unable to urinate; numbness, tingling or acutely concerning matters to you, that may require urgent medical attention. continue current medications for BPH

## 2018-01-30 NOTE — DISCHARGE NOTE ADULT - CARE PROVIDER_API CALL
Wyatt Young), Vascular Surgery  [address]  [address]  [place] [address]  Phone: [phone]  Fax: [phone]

## 2018-01-30 NOTE — DISCHARGE NOTE ADULT - HOSPITAL COURSE
78 yo male with h/o HTN, HLD, renal neoplasm s/p left nephrectomy and known AAA, which increased in size from approximately 4.5 cm 9 months ago to 5.5 cm. There was an incidental finding of a right renal neoplasm on imaging done to evaluate AAA. Pt is scheduled for endovascular repair of AAA on 1/29/18. He is following up with Dr Santiago for evaluation of right renal mass. Pt denies back or abdominal pain.    Patient underwent endovascular repair of abdominal aortic aneurysm under general anesthesia. Patient tolerated the procedure well, was extubated in the OR , then transferred to the PACU in stable condition. Patient remained in PACU for postop monitoring of Vallecillo catheter output, vital signs, pulse checks and pain control. On serial post op checks, the patient c/o soreness at both groins and had no other complaints. The patient was subsequently transferred to a surgical floor. ON POD 1, the patient tolerated a regular diet,  the Vallecillo catheter was removed, and the patient ambulated without assistance. The patient _____________ in the afternoon and was cleared for discharge to home. The patient received instructions for follow-up appointments, medications, diet, and activities. 76 yo male with h/o HTN, HLD, renal neoplasm s/p left nephrectomy and known AAA, which increased in size from approximately 4.5 cm 9 months ago to 5.5 cm. There was an incidental finding of a right renal neoplasm on imaging done to evaluate AAA. Pt is scheduled for endovascular repair of AAA on 1/29/18. He is following up with Dr Santiago for evaluation of right renal mass. Pt denies back or abdominal pain.    Patient underwent endovascular repair of abdominal aortic aneurysm under general anesthesia. Patient tolerated the procedure well, was extubated in the OR , then transferred to the PACU in stable condition. Patient remained in PACU for postop monitoring of Vallecillo catheter output, vital signs, pulse checks and pain control. On serial post op checks, the patient c/o soreness at both groins and had no other complaints. The patient was subsequently transferred to a surgical floor. ON POD 1, the patient tolerated a regular diet,  the Vallecillo catheter was removed, and the patient ambulated without assistance. The patient (voided) in the afternoon and was cleared for discharge to home. The patient received instructions for follow-up appointments, medications, diet, and activities. 78 yo male with h/o HTN, HLD, renal neoplasm s/p left nephrectomy and known AAA, which increased in size from approximately 4.5 cm 9 months ago to 5.5 cm. There was an incidental finding of a right renal neoplasm on imaging done to evaluate AAA. Pt is scheduled for endovascular repair of AAA on 1/29/18. He is following up with Dr Santiago for evaluation of right renal mass. Pt denies back or abdominal pain.    Patient underwent endovascular repair of abdominal aortic aneurysm under general anesthesia. Patient tolerated the procedure well, was extubated in the OR , then transferred to the PACU in stable condition. Patient remained in PACU for postop monitoring of Vallecillo catheter output, vital signs, pulse checks and pain control. On serial post op checks, the patient c/o soreness at both groins and had no other complaints. The patient was subsequently transferred to a surgical floor. ON POD 1, the patient tolerated a regular diet,  the Vallecillo catheter was removed, and the patient ambulated without assistance. The patient failed to void after 8 hours and bladder scan demonstrated 150cc of residual urine. After discussion with team, the patient was agreeable to stay overnight. He received for IV fluids.   On POD 2 the patient was voiding urine, tolerating a regular diet, ambulating  and is ready for discharge to home. The patient received instructions for follow-up appointments, medications, diet, and activities.

## 2018-01-30 NOTE — DISCHARGE NOTE ADULT - MEDICATION SUMMARY - MEDICATIONS TO TAKE
I will START or STAY ON the medications listed below when I get home from the hospital:    oxyCODONE-acetaminophen 5 mg-325 mg oral tablet  -- 1 tab(s) by mouth every 4 hours, As needed, Moderate Pain MDD:5 tabs  -- Indication: For Pain    aspirin 81 mg oral tablet  -- 1 tab(s) by mouth 2 times a day  -- Indication: For Aspirin    losartan 100 mg oral tablet  -- 1 tab(s) by mouth once a day  -- Indication: For Cholesterol    diazePAM 10 mg oral tablet  -- orally 2 times a day, As Needed  -- Indication: For Anxiety    escitalopram 10 mg oral tablet  -- 1 tab(s) by mouth once a day  -- Indication: For Depression    niacin 250 mg oral tablet  -- 1 tab(s) by mouth once a day  -- Indication: For Cholesterol    rosuvastatin 20 mg oral tablet  -- 1 tab(s) by mouth once a day (at bedtime)  -- Indication: For Cholesterol    metoprolol tartrate 100 mg oral tablet  -- 1 tab(s) by mouth 2 times a day  -- Indication: For Blood pressure    latanoprost 0.005% ophthalmic solution  -- 1 drop(s) to each affected eye once a day (in the evening)  -- Indication: For Eye drops

## 2018-01-30 NOTE — PROGRESS NOTE ADULT - SUBJECTIVE AND OBJECTIVE BOX
Vascular Surgery Progress Note     Subjective/24hour Events: s/p EVAR; Pt with episodes of nausea and vomiting overnight last night    Vital Signs:  Vital Signs Last 24 Hrs  T(C): 37.4 (30 Jan 2018 08:05), Max: 37.9 (29 Jan 2018 23:00)  T(F): 99.3 (30 Jan 2018 08:05), Max: 100.2 (29 Jan 2018 23:00)  HR: 76 (30 Jan 2018 08:05) (58 - 97)  BP: 111/65 (30 Jan 2018 08:05) (87/54 - 148/64)  BP(mean): 71 (30 Jan 2018 04:00) (63 - 96)  RR: 16 (30 Jan 2018 08:05) (9 - 22)  SpO2: 95% (30 Jan 2018 08:05) (92% - 100%)    CAPILLARY BLOOD GLUCOSE          I&O's Detail    29 Jan 2018 07:01  -  30 Jan 2018 07:00  --------------------------------------------------------  IN:    IV PiggyBack: 100 mL    Oral Fluid: 150 mL    sodium chloride 0.9% with potassium chloride 20 mEq/L: 1120 mL  Total IN: 1370 mL    OUT:    Emesis: 50 mL    Indwelling Catheter - Urethral: 1425 mL  Total OUT: 1475 mL    Total NET: -105 mL      30 Jan 2018 07:01  -  30 Jan 2018 10:07  --------------------------------------------------------  IN:    Oral Fluid: 360 mL  Total IN: 360 mL    OUT:    Indwelling Catheter - Urethral: 200 mL  Total OUT: 200 mL    Total NET: 160 mL            MEDICATIONS  (STANDING):  atorvastatin 20 milliGRAM(s) Oral at bedtime  escitalopram 10 milliGRAM(s) Oral daily  heparin  Injectable 5000 Unit(s) SubCutaneous every 8 hours  losartan 100 milliGRAM(s) Oral daily  metoprolol     tartrate 100 milliGRAM(s) Oral two times a day  sodium chloride 0.9% with potassium chloride 20 mEq/L 1000 milliLiter(s) (80 mL/Hr) IV Continuous <Continuous>    MEDICATIONS  (PRN):  oxyCODONE    5 mG/acetaminophen 325 mG 1 Tablet(s) Oral every 4 hours PRN Moderate Pain        Physical Exam:  Gen: NAD  LS: Clear to anterior chest wall  Card: S1S2, r/r/r, (+)CARLOS MANUEL HBA 2ICS RSB  GI: abd s/nt/nd, bsx4  Ext: B/L LE warm and cap refill <3secs; Faint LLE DP and PT signal; Strong RLE DP/PT signal appreciated; bilat groin sites with serous-sanginuous stainig appreciated on old dressing; Staple line intact, (-)erythema or tenderness      Labs:    01-30    140  |  107  |  22  ----------------------------<  129<H>  4.8   |  23  |  1.22    Ca    8.3<L>      30 Jan 2018 02:53                              11.7   10.1  )-----------( 163      ( 30 Jan 2018 02:53 )             31.5               Imaging:

## 2018-01-31 VITALS
TEMPERATURE: 99 F | DIASTOLIC BLOOD PRESSURE: 50 MMHG | OXYGEN SATURATION: 93 % | SYSTOLIC BLOOD PRESSURE: 127 MMHG | RESPIRATION RATE: 18 BRPM | HEART RATE: 79 BPM

## 2018-01-31 LAB
BUN SERPL-MCNC: 31 MG/DL — HIGH (ref 7–23)
BUN SERPL-MCNC: 32 MG/DL — HIGH (ref 7–23)
CALCIUM SERPL-MCNC: 8.1 MG/DL — LOW (ref 8.4–10.5)
CALCIUM SERPL-MCNC: 8.2 MG/DL — LOW (ref 8.4–10.5)
CHLORIDE SERPL-SCNC: 101 MMOL/L — SIGNIFICANT CHANGE UP (ref 96–108)
CHLORIDE SERPL-SCNC: 98 MMOL/L — SIGNIFICANT CHANGE UP (ref 96–108)
CO2 SERPL-SCNC: 21 MMOL/L — LOW (ref 22–31)
CO2 SERPL-SCNC: 22 MMOL/L — SIGNIFICANT CHANGE UP (ref 22–31)
CREAT ?TM UR-MCNC: 130 MG/DL — SIGNIFICANT CHANGE UP
CREAT SERPL-MCNC: 2.18 MG/DL — HIGH (ref 0.5–1.3)
CREAT SERPL-MCNC: 2.24 MG/DL — HIGH (ref 0.5–1.3)
GLUCOSE SERPL-MCNC: 101 MG/DL — HIGH (ref 70–99)
GLUCOSE SERPL-MCNC: 105 MG/DL — HIGH (ref 70–99)
HCT VFR BLD CALC: 28.2 % — LOW (ref 39–50)
HGB BLD-MCNC: 10.4 G/DL — LOW (ref 13–17)
MAGNESIUM SERPL-MCNC: 1.7 MG/DL — SIGNIFICANT CHANGE UP (ref 1.6–2.6)
MCHC RBC-ENTMCNC: 34.7 PG — HIGH (ref 27–34)
MCHC RBC-ENTMCNC: 36.9 GM/DL — HIGH (ref 32–36)
MCV RBC AUTO: 94 FL — SIGNIFICANT CHANGE UP (ref 80–100)
PHOSPHATE SERPL-MCNC: 3 MG/DL — SIGNIFICANT CHANGE UP (ref 2.5–4.5)
PLATELET # BLD AUTO: 130 K/UL — LOW (ref 150–400)
POTASSIUM SERPL-MCNC: 4.6 MMOL/L — SIGNIFICANT CHANGE UP (ref 3.5–5.3)
POTASSIUM SERPL-MCNC: 5.1 MMOL/L — SIGNIFICANT CHANGE UP (ref 3.5–5.3)
POTASSIUM SERPL-SCNC: 4.6 MMOL/L — SIGNIFICANT CHANGE UP (ref 3.5–5.3)
POTASSIUM SERPL-SCNC: 5.1 MMOL/L — SIGNIFICANT CHANGE UP (ref 3.5–5.3)
RBC # BLD: 3 M/UL — LOW (ref 4.2–5.8)
RBC # FLD: 11.2 % — SIGNIFICANT CHANGE UP (ref 10.3–14.5)
SODIUM SERPL-SCNC: 131 MMOL/L — LOW (ref 135–145)
SODIUM SERPL-SCNC: 131 MMOL/L — LOW (ref 135–145)
SODIUM UR-SCNC: <20 MMOL/L — SIGNIFICANT CHANGE UP
WBC # BLD: 10.3 K/UL — SIGNIFICANT CHANGE UP (ref 3.8–10.5)
WBC # FLD AUTO: 10.3 K/UL — SIGNIFICANT CHANGE UP (ref 3.8–10.5)

## 2018-01-31 PROCEDURE — 85027 COMPLETE CBC AUTOMATED: CPT

## 2018-01-31 PROCEDURE — C1887: CPT

## 2018-01-31 PROCEDURE — C1889: CPT

## 2018-01-31 PROCEDURE — C1725: CPT

## 2018-01-31 PROCEDURE — 86923 COMPATIBILITY TEST ELECTRIC: CPT

## 2018-01-31 PROCEDURE — C1894: CPT

## 2018-01-31 PROCEDURE — C1874: CPT

## 2018-01-31 PROCEDURE — 76000 FLUOROSCOPY <1 HR PHYS/QHP: CPT

## 2018-01-31 PROCEDURE — 83735 ASSAY OF MAGNESIUM: CPT

## 2018-01-31 PROCEDURE — 97161 PT EVAL LOW COMPLEX 20 MIN: CPT

## 2018-01-31 PROCEDURE — 84300 ASSAY OF URINE SODIUM: CPT

## 2018-01-31 PROCEDURE — C1769: CPT

## 2018-01-31 PROCEDURE — 80048 BASIC METABOLIC PNL TOTAL CA: CPT

## 2018-01-31 PROCEDURE — C1876: CPT

## 2018-01-31 PROCEDURE — 84100 ASSAY OF PHOSPHORUS: CPT

## 2018-01-31 PROCEDURE — 82570 ASSAY OF URINE CREATININE: CPT

## 2018-01-31 RX ORDER — DOCUSATE SODIUM 100 MG
100 CAPSULE ORAL THREE TIMES A DAY
Qty: 0 | Refills: 0 | Status: DISCONTINUED | OUTPATIENT
Start: 2018-01-31 | End: 2018-01-31

## 2018-01-31 RX ORDER — SODIUM CHLORIDE 9 MG/ML
1000 INJECTION INTRAMUSCULAR; INTRAVENOUS; SUBCUTANEOUS ONCE
Qty: 0 | Refills: 0 | Status: COMPLETED | OUTPATIENT
Start: 2018-01-31 | End: 2018-01-31

## 2018-01-31 RX ORDER — SODIUM CHLORIDE 9 MG/ML
1000 INJECTION INTRAMUSCULAR; INTRAVENOUS; SUBCUTANEOUS
Qty: 0 | Refills: 0 | Status: DISCONTINUED | OUTPATIENT
Start: 2018-01-31 | End: 2018-01-31

## 2018-01-31 RX ORDER — SENNA PLUS 8.6 MG/1
2 TABLET ORAL ONCE
Qty: 0 | Refills: 0 | Status: COMPLETED | OUTPATIENT
Start: 2018-01-31 | End: 2018-01-31

## 2018-01-31 RX ADMIN — Medication 100 MILLIGRAM(S): at 05:26

## 2018-01-31 RX ADMIN — SENNA PLUS 2 TABLET(S): 8.6 TABLET ORAL at 13:57

## 2018-01-31 RX ADMIN — Medication 100 MILLIGRAM(S): at 17:35

## 2018-01-31 RX ADMIN — LOSARTAN POTASSIUM 100 MILLIGRAM(S): 100 TABLET, FILM COATED ORAL at 05:26

## 2018-01-31 RX ADMIN — ESCITALOPRAM OXALATE 10 MILLIGRAM(S): 10 TABLET, FILM COATED ORAL at 12:53

## 2018-01-31 RX ADMIN — HEPARIN SODIUM 5000 UNIT(S): 5000 INJECTION INTRAVENOUS; SUBCUTANEOUS at 12:53

## 2018-01-31 RX ADMIN — HEPARIN SODIUM 5000 UNIT(S): 5000 INJECTION INTRAVENOUS; SUBCUTANEOUS at 05:26

## 2018-01-31 RX ADMIN — SODIUM CHLORIDE 1000 MILLILITER(S): 9 INJECTION INTRAMUSCULAR; INTRAVENOUS; SUBCUTANEOUS at 07:59

## 2018-01-31 RX ADMIN — Medication 100 MILLIGRAM(S): at 12:53

## 2018-01-31 NOTE — PHYSICAL THERAPY INITIAL EVALUATION ADULT - PERTINENT HX OF CURRENT PROBLEM, REHAB EVAL
Pt is a 76 yo male with h/o HTN, HLD, renal neoplasm s/p left nephrectomy and known AAA, which increased in size from approximately 4.5 cm 9 months ago to 5.5 cm. There was an incidental finding of a right renal neoplasm on imaging done to evaluate AAA.  He is following up with Dr Santiago for evaluation of right renal mass. Pt is now s/p endovascular repair of AAA on 1/29/18. Post-op course significant for vomitting and nausea now improving with tolerance to PO

## 2018-01-31 NOTE — PROGRESS NOTE ADULT - ASSESSMENT
This is a 76 y/o male with HTN. HLD, Renal neoplasm post left nephrectomy who is now POD#1 s/p EVAR with a post-op course significant for nausea and vomiting.  Pt has since been able to tolerate po diet and no repeated episodes appreciated.    -Continue home meds  -D/c hall and f/u post void  -Ambulate as tolerated  -Pain control  -Pulse checks  -Possible d/c home today vs tomorrow am
This is a 76 y/o male with HTN. HLD, Renal neoplasm post left nephrectomy who is now POD#2 s/p EVAR with a post-op course significant for nausea and vomiting, which is now improving with tolerance to PO. Patient passed ToV yesterday with 350 cc output. Patient is AVSS with no event overnight     Plan:   -Regular diet  -Continue home meds  -Ambulate as tolerated  -Pain control  -Pulse checks  -Possible d/c home today

## 2018-01-31 NOTE — PHYSICAL THERAPY INITIAL EVALUATION ADULT - STANDING BALANCE: DYNAMIC, REHAB EVAL
bilateral lower extremity Passive ROM was WFL (within functional limits)/bilateral upper extremity Passive ROM was WFL (within functional limits) fair balance

## 2018-01-31 NOTE — PHYSICAL THERAPY INITIAL EVALUATION ADULT - ADDITIONAL COMMENTS
Pt lives in a  with his spouse +2 steps to enter with HR, all needs met on main floor. Pt reports he was ambulating independently without use of an AD and was independent with all ADL's prior to surgery

## 2018-01-31 NOTE — PROGRESS NOTE ADULT - SUBJECTIVE AND OBJECTIVE BOX
Vascular [facility] Daily Progress Note    SUBJECTIVE: Pt seen this morning, no over night event, no acute complaint.     OBJECTIVE:   Vital Signs Last 24 Hrs  T(C): 36.7 (31 Jan 2018 05:24), Max: 37.4 (30 Jan 2018 08:05)  T(F): 98.1 (31 Jan 2018 05:24), Max: 99.3 (30 Jan 2018 08:05)  HR: 74 (31 Jan 2018 05:24) (74 - 78)  BP: 129/65 (31 Jan 2018 05:24) (101/57 - 138/71)  BP(mean): --  RR: 16 (31 Jan 2018 05:24) (16 - 18)  SpO2: 94% (31 Jan 2018 05:24) (94% - 98%)    PHYSICAL EXAM:  Constitutional: resting in bed with no acute distress  Respiratory:  unlabored breathing  Gastrointestinal: Abdomen soft, non distended, non tenderness, incision sites are clearn, dry and intact.   Genitourinary:  Normal.  Extremities:  No edema, no calf tenderrness,  Neurological: AxAxOx3    RADIOLOGY & ADDITIONAL STUDIES: no new studies performed Vascular [facility] Daily Progress Note    SUBJECTIVE: Pt seen this morning, Vallecillo was d/maryellen yesterday and patient was able to pass ToV with 350 cc output    OBJECTIVE:   Vital Signs Last 24 Hrs  T(C): 36.7 (31 Jan 2018 05:24), Max: 37.4 (30 Jan 2018 08:05)  T(F): 98.1 (31 Jan 2018 05:24), Max: 99.3 (30 Jan 2018 08:05)  HR: 74 (31 Jan 2018 05:24) (74 - 78)  BP: 129/65 (31 Jan 2018 05:24) (101/57 - 138/71)  BP(mean): --  RR: 16 (31 Jan 2018 05:24) (16 - 18)  SpO2: 94% (31 Jan 2018 05:24) (94% - 98%)    PHYSICAL EXAM:  Gen: NAD  Pulm: breathing comfortably on room air   Ext: B/L LE warm and cap refill <3secs; Faint LLE DP and PT signal; Strong RLE DP/PT signal appreciated; bilat groin sites with ... ; Staple line intact, (-)erythema or tenderness    RADIOLOGY & ADDITIONAL STUDIES: no new studies performed Vascular [facility] Daily Progress Note    SUBJECTIVE: Pt seen this morning, Vallecillo was d/maryellen yesterday and patient was able to pass ToV with 350 cc output    OBJECTIVE:   Vital Signs Last 24 Hrs  T(C): 36.7 (31 Jan 2018 05:24), Max: 37.4 (30 Jan 2018 08:05)  T(F): 98.1 (31 Jan 2018 05:24), Max: 99.3 (30 Jan 2018 08:05)  HR: 74 (31 Jan 2018 05:24) (74 - 78)  BP: 129/65 (31 Jan 2018 05:24) (101/57 - 138/71)  BP(mean): --  RR: 16 (31 Jan 2018 05:24) (16 - 18)  SpO2: 94% (31 Jan 2018 05:24) (94% - 98%)    PHYSICAL EXAM:  Gen: NAD  Pulm: breathing comfortably on room air   Ext: B/L LE warm and cap refill <3secs; LLE PT signal present; Strong RLE DP signal appreciated; bilat groin sites clean with dresing intact with minimal strike through ; Staple line intact, (-)erythema or tenderness    RADIOLOGY & ADDITIONAL STUDIES: no new studies performed

## 2018-02-04 ENCOUNTER — INPATIENT (INPATIENT)
Facility: HOSPITAL | Age: 78
LOS: 19 days | Discharge: HOME | DRG: 853 | End: 2018-02-24
Attending: INTERNAL MEDICINE | Admitting: STUDENT IN AN ORGANIZED HEALTH CARE EDUCATION/TRAINING PROGRAM
Payer: MEDICARE

## 2018-02-04 ENCOUNTER — EMERGENCY (EMERGENCY)
Facility: HOSPITAL | Age: 78
LOS: 1 days | End: 2018-02-04
Attending: EMERGENCY MEDICINE | Admitting: EMERGENCY MEDICINE
Payer: MEDICARE

## 2018-02-04 VITALS
SYSTOLIC BLOOD PRESSURE: 174 MMHG | RESPIRATION RATE: 24 BRPM | HEART RATE: 126 BPM | DIASTOLIC BLOOD PRESSURE: 110 MMHG | OXYGEN SATURATION: 89 %

## 2018-02-04 VITALS
WEIGHT: 149.91 LBS | DIASTOLIC BLOOD PRESSURE: 96 MMHG | HEIGHT: 67 IN | TEMPERATURE: 97 F | HEART RATE: 128 BPM | SYSTOLIC BLOOD PRESSURE: 162 MMHG | RESPIRATION RATE: 24 BRPM

## 2018-02-04 VITALS
TEMPERATURE: 98 F | RESPIRATION RATE: 30 BRPM | DIASTOLIC BLOOD PRESSURE: 114 MMHG | SYSTOLIC BLOOD PRESSURE: 166 MMHG | OXYGEN SATURATION: 100 % | HEART RATE: 107 BPM

## 2018-02-04 DIAGNOSIS — I50.1 LEFT VENTRICULAR FAILURE, UNSPECIFIED: ICD-10-CM

## 2018-02-04 DIAGNOSIS — E87.5 HYPERKALEMIA: ICD-10-CM

## 2018-02-04 DIAGNOSIS — N17.9 ACUTE KIDNEY FAILURE, UNSPECIFIED: ICD-10-CM

## 2018-02-04 DIAGNOSIS — I50.9 HEART FAILURE, UNSPECIFIED: ICD-10-CM

## 2018-02-04 DIAGNOSIS — I21.4 NON-ST ELEVATION (NSTEMI) MYOCARDIAL INFARCTION: ICD-10-CM

## 2018-02-04 DIAGNOSIS — Z98.890 OTHER SPECIFIED POSTPROCEDURAL STATES: Chronic | ICD-10-CM

## 2018-02-04 DIAGNOSIS — E87.79 OTHER FLUID OVERLOAD: ICD-10-CM

## 2018-02-04 DIAGNOSIS — Z98.89 OTHER SPECIFIED POSTPROCEDURAL STATES: Chronic | ICD-10-CM

## 2018-02-04 LAB
ALBUMIN SERPL ELPH-MCNC: 3 G/DL — LOW (ref 3.3–5)
ALBUMIN SERPL ELPH-MCNC: 3 G/DL — LOW (ref 3.3–5)
ALBUMIN SERPL ELPH-MCNC: 3.6 G/DL — SIGNIFICANT CHANGE UP (ref 3.3–5)
ALP SERPL-CCNC: 59 U/L — SIGNIFICANT CHANGE UP (ref 40–120)
ALP SERPL-CCNC: 60 U/L — SIGNIFICANT CHANGE UP (ref 40–120)
ALP SERPL-CCNC: 67 U/L — SIGNIFICANT CHANGE UP (ref 30–120)
ALT FLD-CCNC: 14 U/L RC — SIGNIFICANT CHANGE UP (ref 10–45)
ALT FLD-CCNC: 17 U/L RC — SIGNIFICANT CHANGE UP (ref 10–45)
ALT FLD-CCNC: 20 U/L DA — SIGNIFICANT CHANGE UP (ref 10–60)
ANION GAP SERPL CALC-SCNC: 20 MMOL/L — HIGH (ref 5–17)
ANION GAP SERPL CALC-SCNC: 23 MMOL/L — HIGH (ref 5–17)
ANION GAP SERPL CALC-SCNC: 23 MMOL/L — HIGH (ref 5–17)
APPEARANCE UR: CLEAR — SIGNIFICANT CHANGE UP
APTT BLD: 30.4 SEC — SIGNIFICANT CHANGE UP (ref 27.5–37.4)
APTT BLD: 51.9 SEC — HIGH (ref 27.5–37.4)
AST SERPL-CCNC: 18 U/L — SIGNIFICANT CHANGE UP (ref 10–40)
AST SERPL-CCNC: 22 U/L — SIGNIFICANT CHANGE UP (ref 10–40)
AST SERPL-CCNC: 31 U/L — SIGNIFICANT CHANGE UP (ref 10–40)
BACTERIA # UR AUTO: SIGNIFICANT CHANGE UP
BASE EXCESS BLDA CALC-SCNC: -11.6 MMOL/L — LOW (ref -2–2)
BASE EXCESS BLDV CALC-SCNC: -9.9 MMOL/L — LOW (ref -2–2)
BASOPHILS # BLD AUTO: 0 K/UL — SIGNIFICANT CHANGE UP (ref 0–0.2)
BASOPHILS # BLD AUTO: 0 K/UL — SIGNIFICANT CHANGE UP (ref 0–0.2)
BASOPHILS # BLD AUTO: 0.1 K/UL — SIGNIFICANT CHANGE UP (ref 0–0.2)
BASOPHILS NFR BLD AUTO: 0.1 % — SIGNIFICANT CHANGE UP (ref 0–2)
BASOPHILS NFR BLD AUTO: 0.1 % — SIGNIFICANT CHANGE UP (ref 0–2)
BASOPHILS NFR BLD AUTO: 0.5 % — SIGNIFICANT CHANGE UP (ref 0–2)
BILIRUB SERPL-MCNC: 0.4 MG/DL — SIGNIFICANT CHANGE UP (ref 0.2–1.2)
BILIRUB SERPL-MCNC: 0.5 MG/DL — SIGNIFICANT CHANGE UP (ref 0.2–1.2)
BILIRUB SERPL-MCNC: 0.6 MG/DL — SIGNIFICANT CHANGE UP (ref 0.2–1.2)
BILIRUB UR-MCNC: NEGATIVE — SIGNIFICANT CHANGE UP
BLD GP AB SCN SERPL QL: NEGATIVE — SIGNIFICANT CHANGE UP
BLOOD GAS COMMENTS: SIGNIFICANT CHANGE UP
BLOOD GAS SOURCE: SIGNIFICANT CHANGE UP
BUN SERPL-MCNC: 81 MG/DL — HIGH (ref 7–23)
BUN SERPL-MCNC: 83 MG/DL — HIGH (ref 7–23)
BUN SERPL-MCNC: 85 MG/DL — HIGH (ref 7–23)
CA-I SERPL-SCNC: 1.21 MMOL/L — SIGNIFICANT CHANGE UP (ref 1.12–1.3)
CALCIUM SERPL-MCNC: 8.8 MG/DL — SIGNIFICANT CHANGE UP (ref 8.4–10.5)
CALCIUM SERPL-MCNC: 8.9 MG/DL — SIGNIFICANT CHANGE UP (ref 8.4–10.5)
CALCIUM SERPL-MCNC: 9.1 MG/DL — SIGNIFICANT CHANGE UP (ref 8.4–10.5)
CHLORIDE BLDV-SCNC: 105 MMOL/L — SIGNIFICANT CHANGE UP (ref 96–108)
CHLORIDE SERPL-SCNC: 100 MMOL/L — SIGNIFICANT CHANGE UP (ref 96–108)
CHLORIDE SERPL-SCNC: 102 MMOL/L — SIGNIFICANT CHANGE UP (ref 96–108)
CHLORIDE SERPL-SCNC: 99 MMOL/L — SIGNIFICANT CHANGE UP (ref 96–108)
CHOLEST SERPL-MCNC: 145 MG/DL — SIGNIFICANT CHANGE UP (ref 10–199)
CK MB BLD-MCNC: 7.1 % — HIGH (ref 0–3.5)
CK MB CFR SERPL CALC: 10.1 NG/ML — HIGH (ref 0–3.6)
CK SERPL-CCNC: 143 U/L — SIGNIFICANT CHANGE UP (ref 39–308)
CK SERPL-CCNC: 151 U/L — SIGNIFICANT CHANGE UP (ref 30–200)
CO2 BLDV-SCNC: 18 MMOL/L — LOW (ref 22–30)
CO2 SERPL-SCNC: 11 MMOL/L — LOW (ref 22–31)
CO2 SERPL-SCNC: 15 MMOL/L — LOW (ref 22–31)
CO2 SERPL-SCNC: 16 MMOL/L — LOW (ref 22–31)
COLOR SPEC: YELLOW — SIGNIFICANT CHANGE UP
CREAT SERPL-MCNC: 5.9 MG/DL — HIGH (ref 0.5–1.3)
CREAT SERPL-MCNC: 6.48 MG/DL — HIGH (ref 0.5–1.3)
CREAT SERPL-MCNC: 6.73 MG/DL — HIGH (ref 0.5–1.3)
D DIMER BLD IA.RAPID-MCNC: 3590 NG/ML DDU — HIGH
DIFF PNL FLD: ABNORMAL
EOSINOPHIL # BLD AUTO: 0 K/UL — SIGNIFICANT CHANGE UP (ref 0–0.5)
EOSINOPHIL NFR BLD AUTO: 0 % — SIGNIFICANT CHANGE UP (ref 0–6)
EOSINOPHIL NFR BLD AUTO: 0 % — SIGNIFICANT CHANGE UP (ref 0–6)
EOSINOPHIL NFR BLD AUTO: 0.1 % — SIGNIFICANT CHANGE UP (ref 0–6)
EPI CELLS # UR: SIGNIFICANT CHANGE UP
ESTIMATED AVERAGE GLUCOSE: 108 MG/DL — SIGNIFICANT CHANGE UP (ref 68–114)
FLUAV SPEC QL CULT: NEGATIVE — SIGNIFICANT CHANGE UP
FLUBV AG SPEC QL IA: NEGATIVE — SIGNIFICANT CHANGE UP
GAS PNL BLDA: SIGNIFICANT CHANGE UP
GAS PNL BLDV: 136 MMOL/L — SIGNIFICANT CHANGE UP (ref 136–145)
GAS PNL BLDV: SIGNIFICANT CHANGE UP
GAS PNL BLDV: SIGNIFICANT CHANGE UP
GLUCOSE BLDV-MCNC: 181 MG/DL — HIGH (ref 70–99)
GLUCOSE SERPL-MCNC: 187 MG/DL — HIGH (ref 70–99)
GLUCOSE SERPL-MCNC: 194 MG/DL — HIGH (ref 70–99)
GLUCOSE SERPL-MCNC: 210 MG/DL — HIGH (ref 70–99)
GLUCOSE UR QL: 50 MG/DL
HBA1C BLD-MCNC: 5.4 % — SIGNIFICANT CHANGE UP (ref 4–5.6)
HCO3 BLDA-SCNC: 15 MMOL/L — LOW (ref 21–29)
HCO3 BLDV-SCNC: 17 MMOL/L — LOW (ref 21–29)
HCT VFR BLD CALC: 29.6 % — LOW (ref 39–50)
HCT VFR BLD CALC: 29.7 % — LOW (ref 39–50)
HCT VFR BLD CALC: 29.8 % — LOW (ref 39–50)
HCT VFR BLDA CALC: 33 % — LOW (ref 39–50)
HDLC SERPL-MCNC: 26 MG/DL — LOW (ref 40–125)
HGB BLD CALC-MCNC: 10.8 G/DL — LOW (ref 13–17)
HGB BLD-MCNC: 10.5 G/DL — LOW (ref 13–17)
HGB BLD-MCNC: 10.8 G/DL — LOW (ref 13–17)
HGB BLD-MCNC: 10.9 G/DL — LOW (ref 13–17)
HOROWITZ INDEX BLDA+IHG-RTO: 28 — SIGNIFICANT CHANGE UP
INR BLD: 1.21 RATIO — HIGH (ref 0.88–1.16)
INR BLD: 1.24 RATIO — HIGH (ref 0.88–1.16)
KETONES UR-MCNC: NEGATIVE — SIGNIFICANT CHANGE UP
LACTATE BLDV-MCNC: 2.1 MMOL/L — HIGH (ref 0.7–2)
LEUKOCYTE ESTERASE UR-ACNC: ABNORMAL
LIPID PNL WITH DIRECT LDL SERPL: 87 MG/DL — SIGNIFICANT CHANGE UP
LYMPHOCYTES # BLD AUTO: 0.8 K/UL — LOW (ref 1–3.3)
LYMPHOCYTES # BLD AUTO: 1 K/UL — SIGNIFICANT CHANGE UP (ref 1–3.3)
LYMPHOCYTES # BLD AUTO: 1.1 K/UL — SIGNIFICANT CHANGE UP (ref 1–3.3)
LYMPHOCYTES # BLD AUTO: 4.9 % — LOW (ref 13–44)
LYMPHOCYTES # BLD AUTO: 5.7 % — LOW (ref 13–44)
LYMPHOCYTES # BLD AUTO: 5.9 % — LOW (ref 13–44)
MAGNESIUM SERPL-MCNC: 2.2 MG/DL — SIGNIFICANT CHANGE UP (ref 1.6–2.6)
MCHC RBC-ENTMCNC: 31.8 PG — SIGNIFICANT CHANGE UP (ref 27–34)
MCHC RBC-ENTMCNC: 34.2 PG — HIGH (ref 27–34)
MCHC RBC-ENTMCNC: 34.3 PG — HIGH (ref 27–34)
MCHC RBC-ENTMCNC: 35.6 GM/DL — SIGNIFICANT CHANGE UP (ref 32–36)
MCHC RBC-ENTMCNC: 36.5 GM/DL — HIGH (ref 32–36)
MCHC RBC-ENTMCNC: 36.5 GM/DL — HIGH (ref 32–36)
MCV RBC AUTO: 89.2 FL — SIGNIFICANT CHANGE UP (ref 80–100)
MCV RBC AUTO: 93.8 FL — SIGNIFICANT CHANGE UP (ref 80–100)
MCV RBC AUTO: 94 FL — SIGNIFICANT CHANGE UP (ref 80–100)
MONOCYTES # BLD AUTO: 0.5 K/UL — SIGNIFICANT CHANGE UP (ref 0–0.9)
MONOCYTES # BLD AUTO: 1.3 K/UL — HIGH (ref 0–0.9)
MONOCYTES # BLD AUTO: 1.5 K/UL — HIGH (ref 0–0.9)
MONOCYTES NFR BLD AUTO: 3.9 % — SIGNIFICANT CHANGE UP (ref 2–14)
MONOCYTES NFR BLD AUTO: 6.9 % — SIGNIFICANT CHANGE UP (ref 2–14)
MONOCYTES NFR BLD AUTO: 7.6 % — SIGNIFICANT CHANGE UP (ref 2–14)
NEUTROPHILS # BLD AUTO: 12.4 K/UL — HIGH (ref 1.8–7.4)
NEUTROPHILS # BLD AUTO: 14.6 K/UL — HIGH (ref 1.8–7.4)
NEUTROPHILS # BLD AUTO: 19.4 K/UL — HIGH (ref 1.8–7.4)
NEUTROPHILS NFR BLD AUTO: 86.6 % — HIGH (ref 43–77)
NEUTROPHILS NFR BLD AUTO: 88.1 % — HIGH (ref 43–77)
NEUTROPHILS NFR BLD AUTO: 89.6 % — HIGH (ref 43–77)
NITRITE UR-MCNC: NEGATIVE — SIGNIFICANT CHANGE UP
NT-PROBNP SERPL-SCNC: HIGH PG/ML (ref 0–450)
OTHER CELLS CSF MANUAL: 4 ML/DL — LOW (ref 18–22)
PCO2 BLDA: 26 MMHG — LOW (ref 32–46)
PCO2 BLDV: 41 MMHG — SIGNIFICANT CHANGE UP (ref 35–50)
PH BLD: 7.33 — LOW (ref 7.35–7.45)
PH BLDV: 7.23 — LOW (ref 7.35–7.45)
PH UR: 5 — SIGNIFICANT CHANGE UP (ref 5–8)
PHOSPHATE SERPL-MCNC: 6.2 MG/DL — HIGH (ref 2.5–4.5)
PLATELET # BLD AUTO: 243 K/UL — SIGNIFICANT CHANGE UP (ref 150–400)
PLATELET # BLD AUTO: 297 K/UL — SIGNIFICANT CHANGE UP (ref 150–400)
PLATELET # BLD AUTO: 312 K/UL — SIGNIFICANT CHANGE UP (ref 150–400)
PO2 BLDA: 65 MMHG — LOW (ref 74–108)
PO2 BLDV: 24 MMHG — LOW (ref 25–45)
POTASSIUM BLDV-SCNC: 5.4 MMOL/L — HIGH (ref 3.5–5)
POTASSIUM SERPL-MCNC: 5.2 MMOL/L — SIGNIFICANT CHANGE UP (ref 3.5–5.3)
POTASSIUM SERPL-MCNC: 5.4 MMOL/L — HIGH (ref 3.5–5.3)
POTASSIUM SERPL-MCNC: 5.7 MMOL/L — HIGH (ref 3.5–5.3)
POTASSIUM SERPL-SCNC: 5.2 MMOL/L — SIGNIFICANT CHANGE UP (ref 3.5–5.3)
POTASSIUM SERPL-SCNC: 5.4 MMOL/L — HIGH (ref 3.5–5.3)
POTASSIUM SERPL-SCNC: 5.7 MMOL/L — HIGH (ref 3.5–5.3)
PROT SERPL-MCNC: 6.8 G/DL — SIGNIFICANT CHANGE UP (ref 6–8.3)
PROT SERPL-MCNC: 7.1 G/DL — SIGNIFICANT CHANGE UP (ref 6–8.3)
PROT SERPL-MCNC: 7.6 G/DL — SIGNIFICANT CHANGE UP (ref 6–8.3)
PROT UR-MCNC: 30 MG/DL
PROTHROM AB SERPL-ACNC: 13.2 SEC — HIGH (ref 9.8–12.7)
PROTHROM AB SERPL-ACNC: 13.6 SEC — HIGH (ref 9.8–12.7)
RBC # BLD: 3.17 M/UL — LOW (ref 4.2–5.8)
RBC # BLD: 3.17 M/UL — LOW (ref 4.2–5.8)
RBC # BLD: 3.31 M/UL — LOW (ref 4.2–5.8)
RBC # FLD: 10.9 % — SIGNIFICANT CHANGE UP (ref 10.3–14.5)
RBC # FLD: 11.2 % — SIGNIFICANT CHANGE UP (ref 10.3–14.5)
RBC # FLD: 11.4 % — SIGNIFICANT CHANGE UP (ref 10.3–14.5)
RBC CASTS # UR COMP ASSIST: SIGNIFICANT CHANGE UP /HPF (ref 0–4)
RH IG SCN BLD-IMP: POSITIVE — SIGNIFICANT CHANGE UP
SAO2 % BLDA: 91 % — LOW (ref 92–96)
SAO2 % BLDV: 28 % — LOW (ref 67–88)
SODIUM SERPL-SCNC: 134 MMOL/L — LOW (ref 135–145)
SODIUM SERPL-SCNC: 137 MMOL/L — SIGNIFICANT CHANGE UP (ref 135–145)
SODIUM SERPL-SCNC: 138 MMOL/L — SIGNIFICANT CHANGE UP (ref 135–145)
SP GR SPEC: 1.02 — SIGNIFICANT CHANGE UP (ref 1.01–1.02)
TOTAL CHOLESTEROL/HDL RATIO MEASUREMENT: 5.6 RATIO — SIGNIFICANT CHANGE UP (ref 3.4–9.6)
TRIGL SERPL-MCNC: 162 MG/DL — HIGH (ref 10–149)
TROPONIN I SERPL-MCNC: 2.96 NG/ML — HIGH (ref 0.02–0.06)
TROPONIN T SERPL-MCNC: 0.72 NG/ML — HIGH (ref 0–0.06)
TSH SERPL-MCNC: 3.08 UIU/ML — SIGNIFICANT CHANGE UP (ref 0.27–4.2)
UROBILINOGEN FLD QL: NEGATIVE MG/DL — SIGNIFICANT CHANGE UP
WBC # BLD: 13.8 K/UL — HIGH (ref 3.8–10.5)
WBC # BLD: 16.9 K/UL — HIGH (ref 3.8–10.5)
WBC # BLD: 22.1 K/UL — HIGH (ref 3.8–10.5)
WBC # FLD AUTO: 13.8 K/UL — HIGH (ref 3.8–10.5)
WBC # FLD AUTO: 16.9 K/UL — HIGH (ref 3.8–10.5)
WBC # FLD AUTO: 22.1 K/UL — HIGH (ref 3.8–10.5)
WBC UR QL: SIGNIFICANT CHANGE UP

## 2018-02-04 PROCEDURE — 71045 X-RAY EXAM CHEST 1 VIEW: CPT | Mod: 26

## 2018-02-04 PROCEDURE — 71045 X-RAY EXAM CHEST 1 VIEW: CPT | Mod: 26,77

## 2018-02-04 PROCEDURE — 93010 ELECTROCARDIOGRAM REPORT: CPT

## 2018-02-04 PROCEDURE — 99223 1ST HOSP IP/OBS HIGH 75: CPT | Mod: GC

## 2018-02-04 PROCEDURE — 93306 TTE W/DOPPLER COMPLETE: CPT | Mod: 26

## 2018-02-04 PROCEDURE — 99291 CRITICAL CARE FIRST HOUR: CPT | Mod: 25,GC

## 2018-02-04 PROCEDURE — 74019 RADEX ABDOMEN 2 VIEWS: CPT | Mod: 26

## 2018-02-04 PROCEDURE — 93970 EXTREMITY STUDY: CPT | Mod: 26

## 2018-02-04 PROCEDURE — 93308 TTE F-UP OR LMTD: CPT | Mod: 26

## 2018-02-04 RX ORDER — HEPARIN SODIUM 5000 [USP'U]/ML
4000 INJECTION INTRAVENOUS; SUBCUTANEOUS EVERY 6 HOURS
Qty: 0 | Refills: 0 | Status: DISCONTINUED | OUTPATIENT
Start: 2018-02-04 | End: 2018-02-06

## 2018-02-04 RX ORDER — SODIUM CHLORIDE 9 MG/ML
3 INJECTION INTRAMUSCULAR; INTRAVENOUS; SUBCUTANEOUS ONCE
Qty: 0 | Refills: 0 | Status: COMPLETED | OUTPATIENT
Start: 2018-02-04 | End: 2018-02-04

## 2018-02-04 RX ORDER — FUROSEMIDE 40 MG
40 TABLET ORAL ONCE
Qty: 0 | Refills: 0 | Status: COMPLETED | OUTPATIENT
Start: 2018-02-04 | End: 2018-02-04

## 2018-02-04 RX ORDER — ONDANSETRON 8 MG/1
4 TABLET, FILM COATED ORAL ONCE
Qty: 0 | Refills: 0 | Status: COMPLETED | OUTPATIENT
Start: 2018-02-04 | End: 2018-02-04

## 2018-02-04 RX ORDER — ATORVASTATIN CALCIUM 80 MG/1
80 TABLET, FILM COATED ORAL AT BEDTIME
Qty: 0 | Refills: 0 | Status: DISCONTINUED | OUTPATIENT
Start: 2018-02-04 | End: 2018-02-24

## 2018-02-04 RX ORDER — HEPARIN SODIUM 5000 [USP'U]/ML
4100 INJECTION INTRAVENOUS; SUBCUTANEOUS EVERY 6 HOURS
Qty: 0 | Refills: 0 | Status: DISCONTINUED | OUTPATIENT
Start: 2018-02-04 | End: 2018-02-04

## 2018-02-04 RX ORDER — FUROSEMIDE 40 MG
80 TABLET ORAL DAILY
Qty: 0 | Refills: 0 | Status: DISCONTINUED | OUTPATIENT
Start: 2018-02-04 | End: 2018-02-04

## 2018-02-04 RX ORDER — HEPARIN SODIUM 5000 [USP'U]/ML
4100 INJECTION INTRAVENOUS; SUBCUTANEOUS ONCE
Qty: 0 | Refills: 0 | Status: COMPLETED | OUTPATIENT
Start: 2018-02-04 | End: 2018-02-04

## 2018-02-04 RX ORDER — TICAGRELOR 90 MG/1
180 TABLET ORAL ONCE
Qty: 0 | Refills: 0 | Status: COMPLETED | OUTPATIENT
Start: 2018-02-04 | End: 2018-02-04

## 2018-02-04 RX ORDER — TICAGRELOR 90 MG/1
180 TABLET ORAL ONCE
Qty: 0 | Refills: 0 | Status: DISCONTINUED | OUTPATIENT
Start: 2018-02-04 | End: 2018-02-04

## 2018-02-04 RX ORDER — ASPIRIN/CALCIUM CARB/MAGNESIUM 324 MG
162 TABLET ORAL ONCE
Qty: 0 | Refills: 0 | Status: COMPLETED | OUTPATIENT
Start: 2018-02-04 | End: 2018-02-04

## 2018-02-04 RX ORDER — FUROSEMIDE 40 MG
80 TABLET ORAL ONCE
Qty: 0 | Refills: 0 | Status: COMPLETED | OUTPATIENT
Start: 2018-02-04 | End: 2018-02-04

## 2018-02-04 RX ORDER — ASPIRIN/CALCIUM CARB/MAGNESIUM 324 MG
325 TABLET ORAL ONCE
Qty: 0 | Refills: 0 | Status: COMPLETED | OUTPATIENT
Start: 2018-02-04 | End: 2018-02-04

## 2018-02-04 RX ORDER — BUMETANIDE 0.25 MG/ML
1 INJECTION INTRAMUSCULAR; INTRAVENOUS
Qty: 10 | Refills: 0 | Status: DISCONTINUED | OUTPATIENT
Start: 2018-02-04 | End: 2018-02-05

## 2018-02-04 RX ORDER — NITROGLYCERIN 6.5 MG
50 CAPSULE, EXTENDED RELEASE ORAL
Qty: 50 | Refills: 0 | Status: DISCONTINUED | OUTPATIENT
Start: 2018-02-04 | End: 2018-02-05

## 2018-02-04 RX ORDER — NITROGLYCERIN 6.5 MG
1 CAPSULE, EXTENDED RELEASE ORAL ONCE
Qty: 0 | Refills: 0 | Status: COMPLETED | OUTPATIENT
Start: 2018-02-04 | End: 2018-02-04

## 2018-02-04 RX ORDER — ASPIRIN/CALCIUM CARB/MAGNESIUM 324 MG
81 TABLET ORAL DAILY
Qty: 0 | Refills: 0 | Status: DISCONTINUED | OUTPATIENT
Start: 2018-02-05 | End: 2018-02-24

## 2018-02-04 RX ORDER — ESCITALOPRAM OXALATE 10 MG/1
1 TABLET, FILM COATED ORAL
Qty: 0 | Refills: 0 | COMMUNITY

## 2018-02-04 RX ORDER — FUROSEMIDE 40 MG
60 TABLET ORAL ONCE
Qty: 0 | Refills: 0 | Status: COMPLETED | OUTPATIENT
Start: 2018-02-04 | End: 2018-02-04

## 2018-02-04 RX ORDER — HEPARIN SODIUM 5000 [USP'U]/ML
INJECTION INTRAVENOUS; SUBCUTANEOUS
Qty: 25000 | Refills: 0 | Status: DISCONTINUED | OUTPATIENT
Start: 2018-02-04 | End: 2018-02-06

## 2018-02-04 RX ORDER — HYDRALAZINE HCL 50 MG
50 TABLET ORAL THREE TIMES A DAY
Qty: 0 | Refills: 0 | Status: DISCONTINUED | OUTPATIENT
Start: 2018-02-04 | End: 2018-02-05

## 2018-02-04 RX ORDER — ASPIRIN/CALCIUM CARB/MAGNESIUM 324 MG
162 TABLET ORAL ONCE
Qty: 0 | Refills: 0 | Status: DISCONTINUED | OUTPATIENT
Start: 2018-02-04 | End: 2018-02-04

## 2018-02-04 RX ORDER — HEPARIN SODIUM 5000 [USP'U]/ML
INJECTION INTRAVENOUS; SUBCUTANEOUS
Qty: 25000 | Refills: 0 | Status: DISCONTINUED | OUTPATIENT
Start: 2018-02-04 | End: 2018-02-04

## 2018-02-04 RX ORDER — TICAGRELOR 90 MG/1
90 TABLET ORAL
Qty: 0 | Refills: 0 | Status: DISCONTINUED | OUTPATIENT
Start: 2018-02-05 | End: 2018-02-06

## 2018-02-04 RX ORDER — METOPROLOL TARTRATE 50 MG
25 TABLET ORAL
Qty: 0 | Refills: 0 | Status: DISCONTINUED | OUTPATIENT
Start: 2018-02-04 | End: 2018-02-04

## 2018-02-04 RX ORDER — HEPARIN SODIUM 5000 [USP'U]/ML
INJECTION INTRAVENOUS; SUBCUTANEOUS
Qty: 25000 | Refills: 0 | Status: DISCONTINUED | OUTPATIENT
Start: 2018-02-04 | End: 2018-02-08

## 2018-02-04 RX ORDER — BUMETANIDE 0.25 MG/ML
2 INJECTION INTRAMUSCULAR; INTRAVENOUS ONCE
Qty: 0 | Refills: 0 | Status: COMPLETED | OUTPATIENT
Start: 2018-02-04 | End: 2018-02-04

## 2018-02-04 RX ADMIN — Medication 40 MILLIGRAM(S): at 12:50

## 2018-02-04 RX ADMIN — Medication 1 PATCH: at 13:30

## 2018-02-04 RX ADMIN — HEPARIN SODIUM 800 UNIT(S)/HR: 5000 INJECTION INTRAVENOUS; SUBCUTANEOUS at 13:36

## 2018-02-04 RX ADMIN — SODIUM CHLORIDE 3 MILLILITER(S): 9 INJECTION INTRAMUSCULAR; INTRAVENOUS; SUBCUTANEOUS at 16:34

## 2018-02-04 RX ADMIN — BUMETANIDE 2 MILLIGRAM(S): 0.25 INJECTION INTRAMUSCULAR; INTRAVENOUS at 20:21

## 2018-02-04 RX ADMIN — Medication 60 MILLIGRAM(S): at 13:23

## 2018-02-04 RX ADMIN — Medication 15 MICROGRAM(S)/MIN: at 16:30

## 2018-02-04 RX ADMIN — TICAGRELOR 180 MILLIGRAM(S): 90 TABLET ORAL at 21:54

## 2018-02-04 RX ADMIN — HEPARIN SODIUM 4100 UNIT(S): 5000 INJECTION INTRAVENOUS; SUBCUTANEOUS at 13:33

## 2018-02-04 RX ADMIN — Medication 162 MILLIGRAM(S): at 14:45

## 2018-02-04 RX ADMIN — BUMETANIDE 10 MG/HR: 0.25 INJECTION INTRAMUSCULAR; INTRAVENOUS at 20:19

## 2018-02-04 RX ADMIN — ATORVASTATIN CALCIUM 80 MILLIGRAM(S): 80 TABLET, FILM COATED ORAL at 22:03

## 2018-02-04 RX ADMIN — Medication 80 MILLIGRAM(S): at 18:36

## 2018-02-04 RX ADMIN — Medication 15 MICROGRAM(S)/MIN: at 20:21

## 2018-02-04 RX ADMIN — ONDANSETRON 4 MILLIGRAM(S): 8 TABLET, FILM COATED ORAL at 23:24

## 2018-02-04 RX ADMIN — Medication 50 MILLIGRAM(S): at 21:53

## 2018-02-04 RX ADMIN — Medication 325 MILLIGRAM(S): at 21:53

## 2018-02-04 RX ADMIN — SODIUM CHLORIDE 3 MILLILITER(S): 9 INJECTION INTRAMUSCULAR; INTRAVENOUS; SUBCUTANEOUS at 11:45

## 2018-02-04 RX ADMIN — HEPARIN SODIUM 800 UNIT(S)/HR: 5000 INJECTION INTRAVENOUS; SUBCUTANEOUS at 18:37

## 2018-02-04 NOTE — ED PROVIDER NOTE - SECONDARY DIAGNOSIS.
Acute renal failure superimposed on chronic kidney disease, unspecified CKD stage, unspecified acute renal failure type ACS (acute coronary syndrome)

## 2018-02-04 NOTE — CHART NOTE - NSCHARTNOTEFT_GEN_A_CORE
====================  CCU MIDNIGHT ROUNDS  ====================    DEUCE BALL  [id number]  Patient is a 77y old  Male who presents with a chief complaint of Shortness of Breath (04 Feb 2018 17:51)    ====================  SUMMARY:  ====================      ====================  NEW EVENTS:  ====================    MEDICATIONS  (STANDING):  aspirin 325 milliGRAM(s) Oral once  atorvastatin 80 milliGRAM(s) Oral at bedtime  buMETAnide Infusion 1 mG/Hr (10 mL/Hr) IV Continuous <Continuous>  heparin  Infusion.  Unit(s)/Hr (8 mL/Hr) IV Continuous <Continuous>  hydrALAZINE 50 milliGRAM(s) Oral three times a day  metolazone 5 milliGRAM(s) Oral once  nitroglycerin  Infusion 50 MICROgram(s)/Min (15 mL/Hr) IV Continuous <Continuous>  ticagrelor 180 milliGRAM(s) Oral once    MEDICATIONS  (PRN):  heparin  Injectable 4000 Unit(s) IV Push every 6 hours PRN For aPTT less than 40    ====================  VITALS (Last 12 hrs):  ====================    T(C): 37.2 (02-04-18 @ 18:45), Max: 37.2 (02-04-18 @ 18:45)  T(F): 99 (02-04-18 @ 18:45), Max: 99 (02-04-18 @ 18:45)  HR: 122 (02-04-18 @ 20:51) (107 - 133)  BP: 152/97 (02-04-18 @ 20:30) (136/107 - 177/107)  BP(mean): 115 (02-04-18 @ 20:30) (115 - 133)  ABP: --  ABP(mean): --  RR: 32 (02-04-18 @ 20:30) (24 - 38)  SpO2: 93% (02-04-18 @ 20:51) (74% - 100%)  Wt(kg): --  CVP(mm Hg): --  CVP(cm H2O): --  CO: --  CI: --  PA: --  PA(mean): --  PCWP: --  SVR: --  PVR: --    I&O's Summary    04 Feb 2018 07:01  -  04 Feb 2018 21:10  --------------------------------------------------------  IN: 40 mL / OUT: 20 mL / NET: 20 mL    ====================  NEW LABS:  ====================    02-04    137  |  99  |  81<H>  ----------------------------<  187<H>  5.4<H>   |  15<L>  |  6.48<H>    Ca    9.1      04 Feb 2018 16:51    TPro  7.1  /  Alb  3.6  /  TBili  0.4  /  DBili  x   /  AST  18  /  ALT  17  /  AlkPhos  60  02-04    PT/INR - ( 04 Feb 2018 16:51 )   PT: 13.6 sec;   INR: 1.24 ratio        PTT - ( 04 Feb 2018 16:51 )  PTT:51.9 sec  Creatine Kinase, Serum: 151 U/L (02-04-18 @ 16:51)  Troponin T, Serum: 0.72 ng/mL <H> (02-04-18 @ 16:51)  Creatine Kinase, Serum: 143 U/L (02-04-18 @ 12:13)    CKMB Units: 10.1 ng/mL (02-04 @ 12:13)    ABG - ( 04 Feb 2018 20:33 )  pH: 7.33  /  pCO2: 24    /  pO2: 90    / HCO3: 13    / Base Excess: -11.7 /  SaO2: 96        ====================  PLAN:  ====================  -       Lety Dyer CCU NP  Beeper #[id number]  Spectra # [phone] ====================  CCU MIDNIGHT ROUNDS  ====================    DEUCE BALL  [id number]  Patient is a 77y old  Male who presents with a chief complaint of Shortness of Breath (04 Feb 2018 17:51)    ====================  SUMMARY:  ====================  This is a 77 year old man with past medical history of AAA 5.5cm s/p EVAAR repair with stents on 1/29 with incidental finding of Right Neoplasm, previous history of Left Neoplasm with Left Nephrectomy in 2008, Bladder Cancer, Known LBBB, HTN, HLD transferred from [facility] after initially presenting with SOB which began last night. Patient states that he was discharged from the hospital following AAA repair and recovering well.  He was walking with walker at home with no complaints.  After going to bed, he was awoken from his sleep complaining of SOB with no relief.  He went to [facility] wtih his SOB getting progressively worse with 1 episode of vomitus before going to hospital.  Patient denies fever, chills, recent sick contact, Chest pain, Abdominal pain, diarrhea.  At [place], paient with elevate d-dimers and transferred for possible PE.  At [facility], patient with  RICKI in V2-V4 with elevated Trops, and ADHF requiring Bipap.    ====================  NEW EVENTS:  ====================    Tachypneic and dyspneic, on BIPAP.  No response from diuretics, CVVHDF started with 100 cc/hr FR.    MEDICATIONS  (STANDING):  aspirin 325 milliGRAM(s) Oral once  atorvastatin 80 milliGRAM(s) Oral at bedtime  buMETAnide Infusion 1 mG/Hr (10 mL/Hr) IV Continuous <Continuous>  heparin  Infusion.  Unit(s)/Hr (8 mL/Hr) IV Continuous <Continuous>  hydrALAZINE 50 milliGRAM(s) Oral three times a day  metolazone 5 milliGRAM(s) Oral once  nitroglycerin  Infusion 50 MICROgram(s)/Min (15 mL/Hr) IV Continuous <Continuous>  ticagrelor 180 milliGRAM(s) Oral once    MEDICATIONS  (PRN):  heparin  Injectable 4000 Unit(s) IV Push every 6 hours PRN For aPTT less than 40    ====================  VITALS (Last 12 hrs):  ====================    T(C): 37.2 (02-04-18 @ 18:45), Max: 37.2 (02-04-18 @ 18:45)  T(F): 99 (02-04-18 @ 18:45), Max: 99 (02-04-18 @ 18:45)  HR: 122 (02-04-18 @ 20:51) (107 - 133)  BP: 152/97 (02-04-18 @ 20:30) (136/107 - 177/107)  BP(mean): 115 (02-04-18 @ 20:30) (115 - 133)  ABP: --  ABP(mean): --  RR: 32 (02-04-18 @ 20:30) (24 - 38)  SpO2: 93% (02-04-18 @ 20:51) (74% - 100%)  Wt(kg): --  CVP(mm Hg): --  CVP(cm H2O): --  CO: --  CI: --  PA: --  PA(mean): --  PCWP: --  SVR: --  PVR: --    I&O's Summary    04 Feb 2018 07:01  -  04 Feb 2018 21:10  --------------------------------------------------------  IN: 40 mL / OUT: 20 mL / NET: 20 mL    ====================  NEW LABS:  ====================    02-04    137  |  99  |  81<H>  ----------------------------<  187<H>  5.4<H>   |  15<L>  |  6.48<H>    Ca    9.1      04 Feb 2018 16:51    TPro  7.1  /  Alb  3.6  /  TBili  0.4  /  DBili  x   /  AST  18  /  ALT  17  /  AlkPhos  60  02-04    PT/INR - ( 04 Feb 2018 16:51 )   PT: 13.6 sec;   INR: 1.24 ratio        PTT - ( 04 Feb 2018 16:51 )  PTT:51.9 sec  Creatine Kinase, Serum: 151 U/L (02-04-18 @ 16:51)  Troponin T, Serum: 0.72 ng/mL <H> (02-04-18 @ 16:51)  Creatine Kinase, Serum: 143 U/L (02-04-18 @ 12:13)    CKMB Units: 10.1 ng/mL (02-04 @ 12:13)    ABG - ( 04 Feb 2018 20:33 )  pH: 7.33  /  pCO2: 24    /  pO2: 90    / HCO3: 13    / Base Excess: -11.7 /  SaO2: 96        ====================  PLAN:  ====================  - Continue BIPAP for ease of breathing.  Attempt to transition to NC when tolerated  - Monitor O2Sat/blood gases  - CVVHD per renal  - Monitor electrolytes  - Strict I & O's  - Continue Tridil drip for preload and afterload reduction, attempt to wean and d/c  - Continue Heparin (ACS protocol)  - Continue DAPT and statin  - Continue Hydralazine for afterload reduction  - Eventual ischemic eval    Lety Dyer CCU NP  Beeper #[id number]  Spectra # [phone]

## 2018-02-04 NOTE — ED ADULT NURSE NOTE - FAMILY HISTORY
Father  Still living? Unknown  Family history of MI (myocardial infarction), Age at diagnosis: Age Unknown     Mother  Still living? Unknown  Family history of MI (myocardial infarction), Age at diagnosis: Age Unknown

## 2018-02-04 NOTE — ED PROVIDER NOTE - MEDICAL DECISION MAKING DETAILS
76 yo M s/p AAA repair. Investigate etiology of SOB and increasing weakness. Correlate with imaging studies and labs. Observe and treat accordingly.

## 2018-02-04 NOTE — CONSULT NOTE ADULT - SUBJECTIVE AND OBJECTIVE BOX
CC: Patient is a 77y old  Male who presents with a chief complaint of SOB      HPI:  78 y/o male with h/o HTN, HLD, left nephrectomy, & recent EVAAR (18) transferred from [place] after initially presenting with chest pain & SOB which began today. Work-up revealed acute MI with DONNIE & CHF. Duplex of the lower extremities negative for DVT. Patient currently in the ED; plan for transfer to CCU for management of acute MI & related sequalae.      PMH  Renal mass, right  BPH (benign prostatic hyperplasia)  Abdominal aortic aneurysm (AAA)  Heel spur, left  Vertigo  Left bundle branch block  Hx antineoplastic chemotherapy (BCG )  Lt Renal Neoplasm  LBBB (Left Bundle Branch Block)  Hyperlipemia (ICD9 272.4)  Bladder Cancer (ICD9 188.9)  HTN    PSH  H/O abdominal aortic aneurysm repair  History of cystoscopy  S/P cystoscopy  History of Lt  Nephrectomy  S/P Tonsillectomy  S/P Cystoscopy  S/P Cystoscopy  S/P Cystoscopy  Ureter Surgery  urethral Stent 2008  Status Post Cystoscopy (ICD9 V45.89)    MEDS  MEDICATIONS  (STANDING):  heparin  Infusion.  Unit(s)/Hr (8 mL/Hr) IV Continuous <Continuous>  nitroglycerin  Infusion 50 MICROgram(s)/Min (15 mL/Hr) IV Continuous <Continuous>  ticagrelor 180 milliGRAM(s) Oral once    MEDICATIONS  (PRN):  heparin  Injectable 4100 Unit(s) IV Push every 6 hours PRN For aPTT less than 40      Allergies    Cipro (Other)  Levaquin (Other)  penicillin (Hives)      Physical Exam  T(C): 36.7 (18 @ 14:55), Max: 36.7 (18 @ 14:55)  HR: 115 (18 @ 16:17) (107 - 133)  BP: 166/114 (18 @ 14:55) (162/96 - 172/104)  RR: 30 (18 @ 14:55) (24 - 32)  SpO2: 94% (18 @ 16:17) (94% - 100%)  Wt(kg): --  Tmax: T(C): , Max: 36.7 (18 @ 14:55)  Wt(kg): --    Gen: NAD  HEENT: normocephalic, atraumatic, no scleral icterus  CV: S1, S2, RRR  Pulm: CTA B/L  Abd: Soft, ND, NTP, no rebound, no guarding, no palpable organomegaly/masses  Ext: B/L groin incisions intact with staples. No drainage. Palp DP pulses B/L with LE edema, 1+ in RLE; 2+ in LLE      Labs:                        10.5   13.8  )-----------( 243      ( 2018 12:13 )             29.6     02-    138  |  102  |  83<H>  ----------------------------<  194<H>  5.2   |  16<L>  |  5.90<H>    Ca    8.9      2018 12:13    TPro  7.6  /  Alb  3.0<L>  /  TBili  0.6  /  DBili  x   /  AST  31  /  ALT  20  /  AlkPhos  67  02-04    PT/INR - ( 2018 12:13 )   PT: 13.2 sec;   INR: 1.21 ratio         PTT - ( 2018 12:13 )  PTT:30.4 sec  Urinalysis Basic - ( 2018 13:17 )    Color: Yellow / Appearance: Clear / S.020 / pH: x  Gluc: x / Ketone: Negative  / Bili: Negative / Urobili: Negative mg/dL   Blood: x / Protein: 30 mg/dL / Nitrite: Negative   Leuk Esterase: Trace / RBC: 0-2 /HPF / WBC 0-2   Sq Epi: x / Non Sq Epi: Few / Bacteria: Trace      ABG - ( 2018 12:26 )  pH: x     /  pCO2: 26    /  pO2: 65    / HCO3: 15    / Base Excess: -11.6 /  SaO2: 91

## 2018-02-04 NOTE — ED PROVIDER NOTE - PROGRESS NOTE DETAILS
sats markedly improved on bipap Jonel PGY3: saturating in high 80s on room air, responded well to bipap placing his saturations in high 90s. bedside echo showing decreased lv fxn and bilateral effusions. Normal ultrasound of legs at [place]. findings concerning for new heart failure and possible missed MI with no acute CP. Cardiology fellow consulted and patient taken promptly to CCU for management and diuresis

## 2018-02-04 NOTE — ED PROVIDER NOTE - OBJECTIVE STATEMENT
Pt is a 78 y/o M, with PMHx of HTN, HLD, renal CA s/p L nephrectomy, and ruptured AAA 3 days s/p repair with stents placed without complications, presenting to the ED with c/o chills, onset 2 days ago. Pt reports he was discharged from the hospital three days ago but wife reports the pt has been having decreased activity. She states he has spent most of his day in bed and has not been eating much. Pt admits to feeling overall unwell. Associated cough, SOB, and GORDON. Began vomiting last night. Pt reports he has not had a BM since the procedure. Denies CP, abd pain, fever, urinary sxs, and melena. Per wife, she states the pt did have a hall cath in place post op.    PMD- [facility]  Vascular Surgery [place]

## 2018-02-04 NOTE — ED PROVIDER NOTE - OBJECTIVE STATEMENT
77 m with AAA repair last week ho soliatry kidney from renal ca - with crf cr 2 on dc now 5 with sob transfer from [place] r/o pe - elev ddimer sent on heparin - pt denies cp , endores sob - since surgery - general wekness no fever pos nonproductive cough 77 m with AAA repair last week ho solitary kidney from renal ca - with crf cr 2 on dc now 5 with sob transfer from [place] r/o pe - elev ddimer sent on heparin - pt denies cp , endores sob - since surgery - general weakness no fever pos nonproductive cough

## 2018-02-04 NOTE — ED PROVIDER NOTE - CRITICAL CARE PROVIDED
interpretation of diagnostic studies/consultation with other physicians/additional history taking/consult w/ pt's family directly relating to pts condition/direct patient care (not related to procedure)/documentation

## 2018-02-04 NOTE — H&P ADULT - PROBLEM SELECTOR PLAN 2
TTE shows hypokinesis of septal and anterolateral walls, large bilateral pleural effusions.  No Pericardial Effusion. No right heart strain  Pro-BNP 54473+  Continue tridil gtt for afterload reduction  Lasix 80mg now

## 2018-02-04 NOTE — ED PROVIDER NOTE - MEDICAL DECISION MAKING DETAILS
april post op AAA repair - strted on heparin for concern [facility] - here with hypoxia bl rales le edema - POCUS - bl blines large bl pl effusions and ant septal hypokinesis ekg - with st elev v1-4 - no cp - cath consult to alannah in ccu - cascular aware and ok with heparin

## 2018-02-04 NOTE — CONSULT NOTE ADULT - PROBLEM SELECTOR RECOMMENDATION 2
In the setting of jorge alberto on ckd.   would treat with lasix which will aid with volume overload as well.

## 2018-02-04 NOTE — CONSULT NOTE ADULT - PROBLEM SELECTOR RECOMMENDATION 9
CKD from left nephrectomy. cr base 1.2-1.3 p/w cr of 5.9 with rapid rise. jorge alberto likely from renal cardio syndrome. would treat aggresively with lasix.   would r/o iatrogenic injury to ureters perioperitevely from Triple A repair.   get renal sono with duplex of arteries.   Check renal lytes for Feurea.   no urgent indication for HD CKD from left nephrectomy. cr base 1.2-1.3 p/w cr of 5.9 with rapid rise. jorge alberto likely from renal cardio syndrome. would treat   with lasix.   would r/o iatrogenic injury to ureters perioperitevely from Triple A repair.   get renal sono with duplex of arteries.   Check renal lytes for Feurea.   no urgent indication for HD

## 2018-02-04 NOTE — ED PROVIDER NOTE - PSH
H/O abdominal aortic aneurysm repair    History of cystoscopy  April 2015  History of Lt  Nephrectomy  6/10 - left  S/P Cystoscopy  bladder polyps removal multiple times (since 1999), 6/10 , 10/2011 & 10/17/2011, 5/12, 11/2012, last 5/13/13, 12/2013, 7/2014  S/P Tonsillectomy    urethral Stent 7/2008  stent placement and removal

## 2018-02-04 NOTE — ED PROVIDER NOTE - CARE PLAN
Principal Discharge DX:	Chronic diastolic congestive heart failure  Secondary Diagnosis:	Acute renal failure superimposed on chronic kidney disease, unspecified CKD stage, unspecified acute renal failure type Principal Discharge DX:	Chronic diastolic congestive heart failure  Secondary Diagnosis:	Acute renal failure superimposed on chronic kidney disease, unspecified CKD stage, unspecified acute renal failure type  Secondary Diagnosis:	ACS (acute coronary syndrome)

## 2018-02-04 NOTE — H&P ADULT - HISTORY OF PRESENT ILLNESS
This is a 77 year old man with past medical history of AAA 5.5cm s/p EVAAR repair on 1/29 with incidental finding of Right Neoplasm, previous history of Left Neoplasm with Left Nephrectomy in 2008, Bladder Cancer, Known LBBB, HTN, HLD transferred from [facility] after initially presenting with SOB which began today. Work-up revealed acute MI with DONNIE & CHF. Duplex of the lower extremities negative for DVT. Patient currently in the ED; plan for transfer to CCU for management of acute MI & related sequalae. This is a 77 year old man with past medical history of AAA 5.5cm s/p EVAAR repair with stents on 1/29 with incidental finding of Right Neoplasm, previous history of Left Neoplasm with Left Nephrectomy in 2008, Bladder Cancer, Known LBBB, HTN, HLD transferred from [facility] after initially presenting with SOB which began last night. Patient states that he was discharged from the hospital following AAA repair and recovering well.  He was walking with walker at home with no complaints.  After going to bed, he was awoken from his sleep complaining of SOB with no relief.  He went to [facility] wtih his SOB getting progressively worse with 1 episode of vomitus before going to hospital.  Patient denies fever, chills, recent sick contact, Chest pain, Abdominal pain, diarrhea.  At [place], paient with elevate d-dimers and transferred for possible PE.  At [facility], patient with  RICKI in V2-V4 with elevated Trops, and ADHF requiring Bipap. This is a 77 year old man with past medical history of AAA 5.5cm s/p EVAAR repair with stents on 1/29 with incidental finding of Right Renal Neoplasm, previous history of Left Renal Neoplasm with Left Nephrectomy in 2008, Bladder Cancer, Known LBBB, HTN, HLD transferred from [facility] after initially presenting with SOB which began last night. Patient states that he was discharged from the hospital following AAA repair and recovering well.  He was walking with walker at home with no complaints.  After going to bed, he was awoken from his sleep complaining of SOB with no relief.  He went to [facility] wtih his SOB getting progressively worse with 1 episode of vomitus before going to hospital.  Patient denies fever, chills, recent sick contact, Chest pain, Abdominal pain, diarrhea.  At [place], paient with elevate d-dimers and transferred for possible PE.  At [facility], patient with  RICKI in V2-V4 with elevated Trops, and ADHF requiring Bipap.

## 2018-02-04 NOTE — CONSULT NOTE ADULT - ASSESSMENT
78y/o male with acute MI s/p recent EVAAR    - no acute vascular surgical issues  - patient evaluated by cardiology; plan for transfer to CCU  - Recommend nephrology evaluation; patient will likely require HD/CVVH for fluid overload  - pt seen & examined with vascular surgical fellow on call; d/w Dr. Keven Licona, PGY-4  p [phone]

## 2018-02-04 NOTE — ED PROVIDER NOTE - PROGRESS NOTE DETAILS
case discuss with Dr Munoz at [facility] accepted the transfer to obtain higher level of care not available in our facility.

## 2018-02-04 NOTE — ED PROVIDER NOTE - CARE PLAN
Principal Discharge DX:	Heart failure Principal Discharge DX:	Pulmonary edema cardiac cause  Secondary Diagnosis:	Acute renal failure superimposed on stage 3 chronic kidney disease, unspecified acute renal failure type

## 2018-02-04 NOTE — ED PROVIDER NOTE - SHIFT CHANGE DETAILS
pt in pulm edeme needs ntg drip - admit to ccu, recc from vasc - awaitng labs recheck potassium 5.0 at [place]

## 2018-02-04 NOTE — H&P ADULT - NSHPPHYSICALEXAM_GEN_ALL_CORE
Constitution: Tachypneic on Bipap    Neuro: A+OX3 No Pain    Pulm: Rales to mid bilaterally    CV: tachycardia SIS2 RRR    ABD: Soft, +BSq4 Bilateral groin incisions to staples well approximated no s+s of infection, no ascites, no flank pain    Extrem:  No pedal edema    Vasc: +2 pedal pulses

## 2018-02-04 NOTE — H&P ADULT - PROBLEM SELECTOR PLAN 3
L Neprectomy 2/2 Renal Ca, Incidental finding of R Renal neoplasm  Creat 6.48  Renal Consult called  R Renal US with duplex of renal arteries  Urine Lytes

## 2018-02-04 NOTE — ED ADULT NURSE REASSESSMENT NOTE - NS ED NURSE REASSESS COMMENT FT1
Pt reported breathing better with 100 % o2 via NRBM
Pt stated that he is breathing better but complains of chest pain. Pt referred to  Dr Bagley. repeat ekg done. Pt medicated as ordered
Pt for transfer to [facility]. Report given to transfer center [facility]. Awaiting for EMS . Pt placed on BiPAP. Pt breathing better with BiPAP
Pt complaining of worsening SOB - O2 given via NRBM at 100%

## 2018-02-04 NOTE — ED ADULT NURSE NOTE - OBJECTIVE STATEMENT
77 y.o. Male transferred from [facility] for SOB. Hx HTN, HLD, renal CA s/p L nephrectomy, AAA repair on 1/29. Pt went to [place] c/o SOB since d/c from AAA repair surgery. In [place], pt's d-dimer was 3590 and BNP was elevated to 68,000. Pt was started on a heparin drip 8ml/hr - was stopped upon arrival as per Dr. Remy. Pt arrived with BiPAP. O2sat was 89% on room air and pt felt SOB without BiPAP. Pt was 97% on BiPAP with rate at 12, ipap/epap at 12/6. Pt arrived with 20g on R AC and indwelling hall. Denies N/V/D, urinary/bowel complications, fever/chills. Family at bedside. Dr. Weems and Dr. Remy at bedside for assessment. Will continue to monitor. 77 y.o. Male transferred from [facility] for SOB. Hx HTN, HLD, renal CA s/p L nephrectomy, AAA repair on 1/29. Pt went to [place] c/o SOB since d/c from AAA repair surgery. In [place], pt's d-dimer was 3590 and BNP was elevated to 68,000. Pt was started on a heparin drip 8ml/hr - was stopped upon arrival as per Dr. Remy. Pt arrived with BiPAP. O2sat was 89% on room air and pt felt SOB without BiPAP. Pt was 97% on BiPAP with rate at 12, ipap/epap at 12/6. Pt arrived with 20g on R AC and indwelling hall. Nitroglycerin patch noted on L breast prior to arrival. Denies N/V/D, urinary/bowel complications, fever/chills. Family at bedside. Dr. Weems and Dr. Remy at bedside for assessment. Will continue to monitor.

## 2018-02-04 NOTE — CONSULT NOTE ADULT - ASSESSMENT
77 M presents with jorge alberto on ckd. s/p EVAAR repair on 1/29 with incidental finding of Right Neoplasm, previous history of Left Neoplasm with Left Nephrectomy in 2009. cr was 1/31 has been 2.4 and 2.1 at the time of post op discharge. cr may have been progressively getting worse since then. Patient presents in renal-cardio syndrome.

## 2018-02-04 NOTE — H&P ADULT - NSHPREVIEWOFSYSTEMS_GEN_ALL_CORE
HEENT:  No complaints of headache change in vision, nose or ear problems, or sore throat.    Cadiovascular:  SOB x 2 days. No complaints of Chest Pain    Gastrointestinal:  Recent AAA repair with bilateral groin incisions +BM 2 days ago. Denies melena, loose stools    Genitourinary:  No complaints of dysuria, nocturia, polyuria, hematuria    Musculoskeletal:  Denies arthralgias, muscle aches, or pains.     Neurological:  No complaints of no weakness, numbness, or incoordination.

## 2018-02-04 NOTE — ED ADULT NURSE NOTE - OBJECTIVE STATEMENT
Pt came in with shortness of breath started last night. S/p repair of AAA last week Jan 29, 2018. Pt denies any pain but complains of worsening SOB and intermittent episodes of vomiting since last. Pt also stated not able to move his bowel since he came back from the hospital. Pt noted able to pass gas.

## 2018-02-04 NOTE — H&P ADULT - PROBLEM SELECTOR PLAN 1
RICKI in V1-V4 with elevated Trops 0.7 in setting of renal failure, SOB no chest pain  Heparin gtt ACS protocol okayed by Vascular  ASA, Brilinta following load, Lipitor  DASH Diet  Continue Tridil gtt for afterload reduction  Consider Lopressor if patient can tolerate

## 2018-02-04 NOTE — ED PROVIDER NOTE - SKIN, MLM
Skin normal color for race, warm, dry and intact. abd surgical wound with no obvious signs of infection, no discharge, staples in place

## 2018-02-04 NOTE — CONSULT NOTE ADULT - SUBJECTIVE AND OBJECTIVE BOX
[facility] DIVISION OF KIDNEY DISEASES AND HYPERTENSION -- INITIAL CONSULT NOTE  --------------------------------------------------------------------------------  Jaya De Leon     --------------------------------------------------------------------------------  HPI:  77 year old man with past medical history of AAA 5.5cm s/p EVAAR repair on  with incidental finding of Right Neoplasm, previous history of Left Neoplasm with Left Nephrectomy in , Bladder Cancer, Known LBBB, HTN, HLD transferred from [facility] after initially presenting with SOB which began today.   cr base is 1.2-1.3 (-2018). cr yael on   after his AAA repair. cr was  has been 2.4 and 2.1. post op he was having trouble urinating. at home he was taking losartan, drinking a lot of water as instructed by his physicians and percocet.     PAST HISTORY  --------------------------------------------------------------------------------  PAST MEDICAL & SURGICAL HISTORY:  Renal mass, right: current - following with Dr Santiago  BPH (benign prostatic hyperplasia)  Abdominal aortic aneurysm (AAA): 5.5 cm  Heel spur, left  Vertigo  Left bundle branch block  Hx antineoplastic chemotherapy (BCG )  Lt Renal Neoplasm: left - s/p left nephrectomy  Hyperlipemia (ICD9 272.4): dx   Bladder Cancer (ICD9 188.9): TCC, dx   HTN: dx   H/O abdominal aortic aneurysm repair  History of cystoscopy: 2015  History of Lt  Nephrectomy: 6/10 - left  S/P Tonsillectomy  S/P Cystoscopy: bladder polyps removal multiple times (since ), 6/10 , 10/2011 &amp; 10/17/2011, , 2012, last 13, 2013, 2014  urethral Stent 2008: stent placement and removal    FAMILY HISTORY:  Family history of MI (myocardial infarction) (Father, Mother): father  76y/o MI, mother  96y/o alzheimers    PAST SOCIAL HISTORY:    ALLERGIES & MEDICATIONS  --------------------------------------------------------------------------------  Allergies    Cipro (Other)  Levaquin (Other)  penicillin (Hives)    Intolerances      Standing Inpatient Medications  atorvastatin 80 milliGRAM(s) Oral at bedtime  furosemide   Injectable 80 milliGRAM(s) IV Push once  heparin  Infusion.  Unit(s)/Hr IV Continuous <Continuous>  nitroglycerin  Infusion 50 MICROgram(s)/Min IV Continuous <Continuous>  ticagrelor 180 milliGRAM(s) Oral once    PRN Inpatient Medications  heparin  Injectable 4000 Unit(s) IV Push every 6 hours PRN      REVIEW OF SYSTEMS  --------------------------------------------------------------------------------  Constitutional: [ ] Fever [ ] Chills [ ] Fatigue [ ] Weight change   HEENT: [ ] Blurred vision [ ] Eye Pain [ ] Headache [ ] Runny nose [ ] Sore Throat   Respiratory: [ ] Cough [ ] Wheezing [x ] Shortness of breath  Cardiovascular: [ ] Chest Pain [ ] Palpitations [ ] GORDON [ ] PND [ ] Orthopnea  Gastrointestinal: [ ] Abdominal Pain [ ] Diarrhea [ ] Constipation [ ] Hemorrhoids [ ] Nausea [ ] Vomiting  Genitourinary: [ ] Nocturia [ ] Dysuria [ ] Incontinence  Extremities: [ ] Swelling [ ] Joint Pain  Neurologic: [ ] Focal deficit [ ] Paresthesias [ ] Syncope  Lymphatic: [ ] Swelling [ ] Lymphadenopathy   Skin: [ ] Rash [ ] Ecchymoses [ ] Wounds [ ] Lesions  Psychiatry: [ ] Depression [ ] Suicidal/Homicidal Ideation [ ] Anxiety [ ] Sleep Disturbances  [x ] 10 point review of systems is otherwise negative except as mentioned above              [ ]Unable to obtain      All other systems were reviewed and are negative, except as noted.    VITALS/PHYSICAL EXAM  --------------------------------------------------------------------------------  T(C): 36.7 (18 @ 14:55), Max: 36.7 (18 @ 14:55)  HR: 116 (18 @ 18:15) (107 - 133)  BP: 164/106 (18 @ 18:15) (162/96 - 176/114)  RR: 36 (18 @ 18:15) (24 - 36)  SpO2: 95% (18 @ 18:15) (74% - 100%)  Wt(kg): --    Height (cm): 170.18 (18 @ 11:31)  Weight (kg): 68 (18 @ 16:05)  BMI (kg/m2): 23.5 (18 @ 16:05)  BSA (m2): 1.79 (18 @ 16:05)  Daily     Daily   I&O's Summary          Physical Exam:  	Gen: NAD   	HEENT: anicteric  	Pulm: crackles B/L   	CV: RRR  	Back: No dependent edema  	Abd: soft, nontender, nondistended  	:hall  	LE: Warm, 2 edema  	Neuro: no asterixis  	Skin: Warm, without rashes  	Vascular access: none      LABS/STUDIES  --------------------------------------------------------------------------------              10.9   16.9  >-----------<  297      [18 16:51]              29.8     137  |  99  |  81  ----------------------------<  187      [18 16:51]  5.4   |  15  |  6.48        Ca     9.1     [18 16:51]    TPro  7.1  /  Alb  3.6  /  TBili  0.4  /  DBili  x   /  AST  18  /  ALT  17  /  AlkPhos  60  [18 16:51]    PT/INR: PT 13.6 , INR 1.24       [18 16:51]  PTT: 51.9       [18 16:51]    Troponin 0.72      [18 16:51]        [18 16:51]    Creatinine Trend:  SCr 6.48 [ 16:51]  SCr 5.90 [ 12:13]  SCr 2.18 [ 13:51]  SCr 2.24 [ 05:59]  SCr 1.22 [ 02:53]    Urinalysis - [18 13:17]      Color Yellow / Appearance Clear / SG 1.020 / pH 5.0      Gluc 50 / Ketone Negative  / Bili Negative / Urobili Negative       Blood Trace / Protein 30 / Leuk Est Trace / Nitrite Negative      RBC 0-2 / WBC 0-2 / Hyaline  / Gran  / Sq Epi  / Non Sq Epi Few / Bacteria Trace    Urine Creatinine 130      [18 @ 08:26]  Urine Sodium <20      [18 08:26]            Radiology  --------------------------------------------------------------------------------  cxr: pulm edema  limited echo : b lines b/l   --------------------------------------------------------------------------------  Jaya De Leon

## 2018-02-05 LAB
ALBUMIN SERPL ELPH-MCNC: 3.1 G/DL — LOW (ref 3.3–5)
ALBUMIN SERPL ELPH-MCNC: 3.1 G/DL — LOW (ref 3.3–5)
ALBUMIN SERPL ELPH-MCNC: 3.7 G/DL — SIGNIFICANT CHANGE UP (ref 3.3–5)
ALP SERPL-CCNC: 57 U/L — SIGNIFICANT CHANGE UP (ref 40–120)
ALP SERPL-CCNC: 59 U/L — SIGNIFICANT CHANGE UP (ref 40–120)
ALP SERPL-CCNC: 65 U/L — SIGNIFICANT CHANGE UP (ref 40–120)
ALT FLD-CCNC: 12 U/L RC — SIGNIFICANT CHANGE UP (ref 10–45)
ALT FLD-CCNC: 13 U/L RC — SIGNIFICANT CHANGE UP (ref 10–45)
ALT FLD-CCNC: 13 U/L RC — SIGNIFICANT CHANGE UP (ref 10–45)
ANION GAP SERPL CALC-SCNC: 18 MMOL/L — HIGH (ref 5–17)
ANION GAP SERPL CALC-SCNC: 20 MMOL/L — HIGH (ref 5–17)
ANION GAP SERPL CALC-SCNC: 21 MMOL/L — HIGH (ref 5–17)
APTT BLD: 35.1 SEC — SIGNIFICANT CHANGE UP (ref 27.5–37.4)
APTT BLD: 44 SEC — HIGH (ref 27.5–37.4)
APTT BLD: 50.8 SEC — HIGH (ref 27.5–37.4)
APTT BLD: 81.2 SEC — HIGH (ref 27.5–37.4)
AST SERPL-CCNC: 19 U/L — SIGNIFICANT CHANGE UP (ref 10–40)
AST SERPL-CCNC: 19 U/L — SIGNIFICANT CHANGE UP (ref 10–40)
AST SERPL-CCNC: 20 U/L — SIGNIFICANT CHANGE UP (ref 10–40)
BASOPHILS # BLD AUTO: 0 K/UL — SIGNIFICANT CHANGE UP (ref 0–0.2)
BASOPHILS NFR BLD AUTO: 0.1 % — SIGNIFICANT CHANGE UP (ref 0–2)
BILIRUB SERPL-MCNC: 0.5 MG/DL — SIGNIFICANT CHANGE UP (ref 0.2–1.2)
BILIRUB SERPL-MCNC: 0.5 MG/DL — SIGNIFICANT CHANGE UP (ref 0.2–1.2)
BILIRUB SERPL-MCNC: 0.7 MG/DL — SIGNIFICANT CHANGE UP (ref 0.2–1.2)
BUN SERPL-MCNC: 45 MG/DL — HIGH (ref 7–23)
BUN SERPL-MCNC: 74 MG/DL — HIGH (ref 7–23)
BUN SERPL-MCNC: 82 MG/DL — HIGH (ref 7–23)
CALCIUM SERPL-MCNC: 8.8 MG/DL — SIGNIFICANT CHANGE UP (ref 8.4–10.5)
CALCIUM SERPL-MCNC: 9 MG/DL — SIGNIFICANT CHANGE UP (ref 8.4–10.5)
CALCIUM SERPL-MCNC: 9.4 MG/DL — SIGNIFICANT CHANGE UP (ref 8.4–10.5)
CHLORIDE SERPL-SCNC: 96 MMOL/L — SIGNIFICANT CHANGE UP (ref 96–108)
CHLORIDE SERPL-SCNC: 99 MMOL/L — SIGNIFICANT CHANGE UP (ref 96–108)
CHLORIDE SERPL-SCNC: 99 MMOL/L — SIGNIFICANT CHANGE UP (ref 96–108)
CK MB BLD-MCNC: 9 % — HIGH (ref 0–3.5)
CK MB BLD-MCNC: 9.6 % — HIGH (ref 0–3.5)
CK MB CFR SERPL CALC: 14 NG/ML — HIGH (ref 0–6.7)
CK MB CFR SERPL CALC: 15.2 NG/ML — HIGH (ref 0–6.7)
CK SERPL-CCNC: 156 U/L — SIGNIFICANT CHANGE UP (ref 30–200)
CK SERPL-CCNC: 158 U/L — SIGNIFICANT CHANGE UP (ref 30–200)
CO2 SERPL-SCNC: 14 MMOL/L — LOW (ref 22–31)
CO2 SERPL-SCNC: 16 MMOL/L — LOW (ref 22–31)
CO2 SERPL-SCNC: 21 MMOL/L — LOW (ref 22–31)
CREAT SERPL-MCNC: 3.97 MG/DL — HIGH (ref 0.5–1.3)
CREAT SERPL-MCNC: 5.51 MG/DL — HIGH (ref 0.5–1.3)
CREAT SERPL-MCNC: 6.2 MG/DL — HIGH (ref 0.5–1.3)
CULTURE RESULTS: NO GROWTH — SIGNIFICANT CHANGE UP
EOSINOPHIL # BLD AUTO: 0 K/UL — SIGNIFICANT CHANGE UP (ref 0–0.5)
EOSINOPHIL NFR BLD AUTO: 0.2 % — SIGNIFICANT CHANGE UP (ref 0–6)
GAS PNL BLDA: SIGNIFICANT CHANGE UP
GLUCOSE SERPL-MCNC: 145 MG/DL — HIGH (ref 70–99)
GLUCOSE SERPL-MCNC: 203 MG/DL — HIGH (ref 70–99)
GLUCOSE SERPL-MCNC: 208 MG/DL — HIGH (ref 70–99)
HCT VFR BLD CALC: 28.9 % — LOW (ref 39–50)
HCT VFR BLD CALC: 29.5 % — LOW (ref 39–50)
HCT VFR BLD CALC: 29.7 % — LOW (ref 39–50)
HGB BLD-MCNC: 10.4 G/DL — LOW (ref 13–17)
HGB BLD-MCNC: 10.5 G/DL — LOW (ref 13–17)
HGB BLD-MCNC: 10.6 G/DL — LOW (ref 13–17)
INR BLD: 1.31 RATIO — HIGH (ref 0.88–1.16)
LACTATE SERPL-SCNC: 1.2 MMOL/L — SIGNIFICANT CHANGE UP (ref 0.7–2)
LYMPHOCYTES # BLD AUTO: 1.5 K/UL — SIGNIFICANT CHANGE UP (ref 1–3.3)
LYMPHOCYTES # BLD AUTO: 7.5 % — LOW (ref 13–44)
MAGNESIUM SERPL-MCNC: 2.2 MG/DL — SIGNIFICANT CHANGE UP (ref 1.6–2.6)
MAGNESIUM SERPL-MCNC: 2.2 MG/DL — SIGNIFICANT CHANGE UP (ref 1.6–2.6)
MAGNESIUM SERPL-MCNC: 2.4 MG/DL — SIGNIFICANT CHANGE UP (ref 1.6–2.6)
MCHC RBC-ENTMCNC: 33.1 PG — SIGNIFICANT CHANGE UP (ref 27–34)
MCHC RBC-ENTMCNC: 33.2 PG — SIGNIFICANT CHANGE UP (ref 27–34)
MCHC RBC-ENTMCNC: 33.4 PG — SIGNIFICANT CHANGE UP (ref 27–34)
MCHC RBC-ENTMCNC: 35.4 GM/DL — SIGNIFICANT CHANGE UP (ref 32–36)
MCHC RBC-ENTMCNC: 35.8 GM/DL — SIGNIFICANT CHANGE UP (ref 32–36)
MCHC RBC-ENTMCNC: 35.9 GM/DL — SIGNIFICANT CHANGE UP (ref 32–36)
MCV RBC AUTO: 92.8 FL — SIGNIFICANT CHANGE UP (ref 80–100)
MCV RBC AUTO: 93 FL — SIGNIFICANT CHANGE UP (ref 80–100)
MCV RBC AUTO: 93.4 FL — SIGNIFICANT CHANGE UP (ref 80–100)
MONOCYTES # BLD AUTO: 1.5 K/UL — HIGH (ref 0–0.9)
MONOCYTES NFR BLD AUTO: 7.3 % — SIGNIFICANT CHANGE UP (ref 2–14)
NEUTROPHILS # BLD AUTO: 17.2 K/UL — HIGH (ref 1.8–7.4)
NEUTROPHILS NFR BLD AUTO: 85 % — HIGH (ref 43–77)
PHOSPHATE SERPL-MCNC: 3.8 MG/DL — SIGNIFICANT CHANGE UP (ref 2.5–4.5)
PHOSPHATE SERPL-MCNC: 4.5 MG/DL — SIGNIFICANT CHANGE UP (ref 2.5–4.5)
PHOSPHATE SERPL-MCNC: 4.9 MG/DL — HIGH (ref 2.5–4.5)
PLATELET # BLD AUTO: 271 K/UL — SIGNIFICANT CHANGE UP (ref 150–400)
PLATELET # BLD AUTO: 285 K/UL — SIGNIFICANT CHANGE UP (ref 150–400)
PLATELET # BLD AUTO: 293 K/UL — SIGNIFICANT CHANGE UP (ref 150–400)
POTASSIUM SERPL-MCNC: 4.2 MMOL/L — SIGNIFICANT CHANGE UP (ref 3.5–5.3)
POTASSIUM SERPL-MCNC: 4.2 MMOL/L — SIGNIFICANT CHANGE UP (ref 3.5–5.3)
POTASSIUM SERPL-MCNC: 4.9 MMOL/L — SIGNIFICANT CHANGE UP (ref 3.5–5.3)
POTASSIUM SERPL-SCNC: 4.2 MMOL/L — SIGNIFICANT CHANGE UP (ref 3.5–5.3)
POTASSIUM SERPL-SCNC: 4.2 MMOL/L — SIGNIFICANT CHANGE UP (ref 3.5–5.3)
POTASSIUM SERPL-SCNC: 4.9 MMOL/L — SIGNIFICANT CHANGE UP (ref 3.5–5.3)
PROCALCITONIN SERPL-MCNC: 7.35 NG/ML — HIGH (ref 0–0.04)
PROT SERPL-MCNC: 6.7 G/DL — SIGNIFICANT CHANGE UP (ref 6–8.3)
PROT SERPL-MCNC: 6.8 G/DL — SIGNIFICANT CHANGE UP (ref 6–8.3)
PROT SERPL-MCNC: 7.6 G/DL — SIGNIFICANT CHANGE UP (ref 6–8.3)
PROTHROM AB SERPL-ACNC: 14.2 SEC — HIGH (ref 9.8–12.7)
RBC # BLD: 3.11 M/UL — LOW (ref 4.2–5.8)
RBC # BLD: 3.16 M/UL — LOW (ref 4.2–5.8)
RBC # BLD: 3.2 M/UL — LOW (ref 4.2–5.8)
RBC # FLD: 11.3 % — SIGNIFICANT CHANGE UP (ref 10.3–14.5)
RBC # FLD: 11.4 % — SIGNIFICANT CHANGE UP (ref 10.3–14.5)
RBC # FLD: 11.4 % — SIGNIFICANT CHANGE UP (ref 10.3–14.5)
SODIUM SERPL-SCNC: 134 MMOL/L — LOW (ref 135–145)
SODIUM SERPL-SCNC: 135 MMOL/L — SIGNIFICANT CHANGE UP (ref 135–145)
SODIUM SERPL-SCNC: 135 MMOL/L — SIGNIFICANT CHANGE UP (ref 135–145)
SPECIMEN SOURCE: SIGNIFICANT CHANGE UP
TROPONIN T SERPL-MCNC: 0.73 NG/ML — HIGH (ref 0–0.06)
TROPONIN T SERPL-MCNC: 0.8 NG/ML — HIGH (ref 0–0.06)
WBC # BLD: 19.1 K/UL — HIGH (ref 3.8–10.5)
WBC # BLD: 20.1 K/UL — HIGH (ref 3.8–10.5)
WBC # BLD: 20.2 K/UL — HIGH (ref 3.8–10.5)
WBC # FLD AUTO: 19.1 K/UL — HIGH (ref 3.8–10.5)
WBC # FLD AUTO: 20.1 K/UL — HIGH (ref 3.8–10.5)
WBC # FLD AUTO: 20.2 K/UL — HIGH (ref 3.8–10.5)

## 2018-02-05 PROCEDURE — 93010 ELECTROCARDIOGRAM REPORT: CPT

## 2018-02-05 PROCEDURE — 93308 TTE F-UP OR LMTD: CPT | Mod: 26

## 2018-02-05 PROCEDURE — 93321 DOPPLER ECHO F-UP/LMTD STD: CPT | Mod: 26

## 2018-02-05 PROCEDURE — 99291 CRITICAL CARE FIRST HOUR: CPT

## 2018-02-05 PROCEDURE — 99233 SBSQ HOSP IP/OBS HIGH 50: CPT | Mod: GC

## 2018-02-05 PROCEDURE — 71045 X-RAY EXAM CHEST 1 VIEW: CPT | Mod: 26

## 2018-02-05 RX ORDER — VANCOMYCIN HCL 1 G
750 VIAL (EA) INTRAVENOUS ONCE
Qty: 0 | Refills: 0 | Status: COMPLETED | OUTPATIENT
Start: 2018-02-05 | End: 2018-02-05

## 2018-02-05 RX ORDER — AZTREONAM 2 G
500 VIAL (EA) INJECTION ONCE
Qty: 0 | Refills: 0 | Status: COMPLETED | OUTPATIENT
Start: 2018-02-05 | End: 2018-02-05

## 2018-02-05 RX ORDER — DOCUSATE SODIUM 100 MG
100 CAPSULE ORAL DAILY
Qty: 0 | Refills: 0 | Status: DISCONTINUED | OUTPATIENT
Start: 2018-02-05 | End: 2018-02-24

## 2018-02-05 RX ORDER — DOCUSATE SODIUM 100 MG
100 CAPSULE ORAL DAILY
Qty: 0 | Refills: 0 | Status: DISCONTINUED | OUTPATIENT
Start: 2018-02-05 | End: 2018-02-05

## 2018-02-05 RX ORDER — ACETAMINOPHEN 500 MG
650 TABLET ORAL ONCE
Qty: 0 | Refills: 0 | Status: COMPLETED | OUTPATIENT
Start: 2018-02-05 | End: 2018-02-05

## 2018-02-05 RX ORDER — METOPROLOL TARTRATE 50 MG
25 TABLET ORAL
Qty: 0 | Refills: 0 | Status: DISCONTINUED | OUTPATIENT
Start: 2018-02-05 | End: 2018-02-05

## 2018-02-05 RX ORDER — ISOSORBIDE DINITRATE 5 MG/1
20 TABLET ORAL THREE TIMES A DAY
Qty: 0 | Refills: 0 | Status: DISCONTINUED | OUTPATIENT
Start: 2018-02-05 | End: 2018-02-06

## 2018-02-05 RX ORDER — HYDRALAZINE HCL 50 MG
75 TABLET ORAL EVERY 8 HOURS
Qty: 0 | Refills: 0 | Status: DISCONTINUED | OUTPATIENT
Start: 2018-02-05 | End: 2018-02-05

## 2018-02-05 RX ORDER — HYDRALAZINE HCL 50 MG
100 TABLET ORAL EVERY 8 HOURS
Qty: 0 | Refills: 0 | Status: DISCONTINUED | OUTPATIENT
Start: 2018-02-05 | End: 2018-02-21

## 2018-02-05 RX ORDER — SENNA PLUS 8.6 MG/1
1 TABLET ORAL AT BEDTIME
Qty: 0 | Refills: 0 | Status: DISCONTINUED | OUTPATIENT
Start: 2018-02-05 | End: 2018-02-24

## 2018-02-05 RX ORDER — ISOSORBIDE DINITRATE 5 MG/1
10 TABLET ORAL EVERY 8 HOURS
Qty: 0 | Refills: 0 | Status: DISCONTINUED | OUTPATIENT
Start: 2018-02-05 | End: 2018-02-05

## 2018-02-05 RX ORDER — AZTREONAM 2 G
500 VIAL (EA) INJECTION EVERY 12 HOURS
Qty: 0 | Refills: 0 | Status: DISCONTINUED | OUTPATIENT
Start: 2018-02-05 | End: 2018-02-06

## 2018-02-05 RX ORDER — POLYETHYLENE GLYCOL 3350 17 G/17G
17 POWDER, FOR SOLUTION ORAL DAILY
Qty: 0 | Refills: 0 | Status: DISCONTINUED | OUTPATIENT
Start: 2018-02-05 | End: 2018-02-05

## 2018-02-05 RX ORDER — AZTREONAM 2 G
VIAL (EA) INJECTION
Qty: 0 | Refills: 0 | Status: DISCONTINUED | OUTPATIENT
Start: 2018-02-05 | End: 2018-02-06

## 2018-02-05 RX ADMIN — TICAGRELOR 90 MILLIGRAM(S): 90 TABLET ORAL at 17:55

## 2018-02-05 RX ADMIN — SENNA PLUS 1 TABLET(S): 8.6 TABLET ORAL at 21:01

## 2018-02-05 RX ADMIN — ISOSORBIDE DINITRATE 10 MILLIGRAM(S): 5 TABLET ORAL at 15:52

## 2018-02-05 RX ADMIN — ISOSORBIDE DINITRATE 10 MILLIGRAM(S): 5 TABLET ORAL at 09:41

## 2018-02-05 RX ADMIN — HEPARIN SODIUM 1250 UNIT(S)/HR: 5000 INJECTION INTRAVENOUS; SUBCUTANEOUS at 23:30

## 2018-02-05 RX ADMIN — Medication 50 MILLIGRAM(S): at 17:08

## 2018-02-05 RX ADMIN — HEPARIN SODIUM 4000 UNIT(S): 5000 INJECTION INTRAVENOUS; SUBCUTANEOUS at 03:30

## 2018-02-05 RX ADMIN — Medication 100 MILLIGRAM(S): at 15:52

## 2018-02-05 RX ADMIN — Medication 75 MILLIGRAM(S): at 06:01

## 2018-02-05 RX ADMIN — ISOSORBIDE DINITRATE 20 MILLIGRAM(S): 5 TABLET ORAL at 22:09

## 2018-02-05 RX ADMIN — HEPARIN SODIUM 950 UNIT(S)/HR: 5000 INJECTION INTRAVENOUS; SUBCUTANEOUS at 10:10

## 2018-02-05 RX ADMIN — ATORVASTATIN CALCIUM 80 MILLIGRAM(S): 80 TABLET, FILM COATED ORAL at 21:01

## 2018-02-05 RX ADMIN — Medication 81 MILLIGRAM(S): at 13:05

## 2018-02-05 RX ADMIN — HEPARIN SODIUM 1100 UNIT(S)/HR: 5000 INJECTION INTRAVENOUS; SUBCUTANEOUS at 17:09

## 2018-02-05 RX ADMIN — Medication 50 MILLIGRAM(S): at 03:30

## 2018-02-05 RX ADMIN — TICAGRELOR 90 MILLIGRAM(S): 90 TABLET ORAL at 06:01

## 2018-02-05 RX ADMIN — Medication 100 MILLIGRAM(S): at 13:05

## 2018-02-05 RX ADMIN — Medication 150 MILLIGRAM(S): at 03:30

## 2018-02-05 RX ADMIN — Medication 650 MILLIGRAM(S): at 23:01

## 2018-02-05 RX ADMIN — Medication 100 MILLIGRAM(S): at 21:01

## 2018-02-05 RX ADMIN — HEPARIN SODIUM 1000 UNIT(S)/HR: 5000 INJECTION INTRAVENOUS; SUBCUTANEOUS at 03:23

## 2018-02-05 NOTE — PROGRESS NOTE ADULT - SUBJECTIVE AND OBJECTIVE BOX
[facility] Division of Kidney Diseases & Hypertension  FOLLOW UP NOTE  [phone]--------------------------------------------------------------------------------    HPI: 77 year old man with past medical history of AAA 5.5cm s/p EVAAR repair on 1/29 with incidental finding of Right Neoplasm, previous history of Left Neoplasm with Left Nephrectomy in 2009, Bladder Cancer, Known LBBB, HTN, HLD transferred from [facility] for worsening SOB. Patient was found to have DONNIE and fluid overload and was initiated on CVVHDF on 2/4/17.    24 hour events/subjective: Patient seen and examined in CCU today. Patient is currently on BIPAP. Tolerating CVVHDF well. Patient remains anuric.       PAST HISTORY  --------------------------------------------------------------------------------  No significant changes to PMH, PSH, FHx, SHx, unless otherwise noted    ALLERGIES & MEDICATIONS  --------------------------------------------------------------------------------  Allergies    Cipro (Other)  Levaquin (Other)  penicillin (Hives)    Intolerances      Standing Inpatient Medications  aspirin enteric coated 81 milliGRAM(s) Oral daily  atorvastatin 80 milliGRAM(s) Oral at bedtime  aztreonam  IVPB 500 milliGRAM(s) IV Intermittent every 12 hours  aztreonam  IVPB      heparin  Infusion.  Unit(s)/Hr IV Continuous <Continuous>  hydrALAZINE 75 milliGRAM(s) Oral every 8 hours  isosorbide   dinitrate Tablet (ISORDIL) 10 milliGRAM(s) Oral every 8 hours  nitroglycerin  Infusion 50 MICROgram(s)/Min IV Continuous <Continuous>  ticagrelor 90 milliGRAM(s) Oral two times a day    PRN Inpatient Medications  heparin  Injectable 4000 Unit(s) IV Push every 6 hours PRN      REVIEW OF SYSTEMS  --------------------------------------------------------------------------------  Gen: No  fevers/chills  Respiratory: SOB +  CV: No chest pain  GI: No abdominal pain,  MSK:no edema  Neuro: No dizziness/lightheadedness    All other systems were reviewed and are negative, except as noted.    VITALS/PHYSICAL EXAM  --------------------------------------------------------------------------------  T(C): 36.1 (02-05-18 @ 07:30), Max: 37.2 (02-04-18 @ 18:45)  HR: 90 (02-05-18 @ 11:15) (84 - 133)  BP: 128/85 (02-05-18 @ 03:30) (124/90 - 181/109)  RR: 19 (02-05-18 @ 11:15) (15 - 38)  SpO2: 95% (02-05-18 @ 11:15) (74% - 100%)  Wt(kg): --  Height (cm): 175.26 (02-04-18 @ 19:00)  Weight (kg): 68 (02-04-18 @ 16:05)  BMI (kg/m2): 22.1 (02-04-18 @ 19:00)  BSA (m2): 1.83 (02-04-18 @ 19:00)      02-04-18 @ 07:01  -  02-05-18 @ 07:00  --------------------------------------------------------  IN: 1006 mL / OUT: 1799 mL / NET: -793 mL    02-05-18 @ 07:01  -  02-05-18 @ 11:24  --------------------------------------------------------  IN: 309.5 mL / OUT: 1034 mL / NET: -724.5 mL      Physical Exam:  	Gen: NAD   	HEENT: anicteric  	Pulm: crackles B/L   	CV: RRR  	Abd: soft, nontender, nondistended  	:rafael present.   	LE: B/L pitting edema present.   	Skin: Warm and dry   	Vascular access: non tunneled HD catheter present.       LABS/STUDIES  --------------------------------------------------------------------------------              10.5   20.2  >-----------<  271      [02-05-18 @ 05:05]              29.5     135  |  99  |  74  ----------------------------<  203      [02-05-18 @ 05:05]  4.2   |  16  |  5.51        Ca     9.0     [02-05-18 @ 05:05]      Mg     2.2     [02-05-18 @ 05:05]      Phos  4.5     [02-05-18 @ 05:05]    TPro  6.8  /  Alb  3.1  /  TBili  0.5  /  DBili  x   /  AST  19  /  ALT  13  /  AlkPhos  59  [02-05-18 @ 05:05]    PT/INR: PT 13.6 , INR 1.24       [02-04-18 @ 16:51]  PTT: 81.2       [02-05-18 @ 09:25]    Troponin 0.80      [02-05-18 @ 01:17]        [02-05-18 @ 01:17]    Creatinine Trend:  SCr 5.51 [02-05 @ 05:05]  SCr 6.20 [02-05 @ 01:17]  SCr 6.73 [02-04 @ 20:39]  SCr 6.48 [02-04 @ 16:51]  SCr 5.90 [02-04 @ 12:13]    Urinalysis - [02-04-18 @ 13:17]      Color Yellow / Appearance Clear / SG 1.020 / pH 5.0      Gluc 50 / Ketone Negative  / Bili Negative / Urobili Negative       Blood Trace / Protein 30 / Leuk Est Trace / Nitrite Negative      RBC 0-2 / WBC 0-2 / Hyaline  / Gran  / Sq Epi  / Non Sq Epi Few / Bacteria Trace    Urine Creatinine 130      [01-31-18 @ 08:26]  Urine Sodium <20      [01-31-18 @ 08:26]    HbA1c 5.4      [02-04-18 @ 21:37]  TSH 3.08      [02-04-18 @ 21:37]  Lipid: chol 145, , HDL 26, LDL 87      [02-04-18 @ 21:37]

## 2018-02-05 NOTE — PROGRESS NOTE ADULT - ASSESSMENT
77 year old man with past medical history of AAA 5.5cm s/p EVAAR repair on 1/29 with incidental finding of Right Neoplasm, previous history of Left Neoplasm with Left Nephrectomy in 2009, Bladder Cancer, Known LBBB, HTN, HLD presents with hypoxic respiratory failure likely 2/2 to pulmonary edema + PNA in the setting of DONNIE on CKD r/o 77 year old man with past medical history of AAA 5.5cm s/p EVAAR repair on 1/29 with incidental finding of Right Neoplasm, previous history of Left Neoplasm with Left Nephrectomy in 2009, Bladder Cancer, Known LBBB, HTN, HLD presents with hypoxic respiratory failure 2/2 to pulmonary edema  2/2 ADHF + multifocal PNA in the setting of DONNIE on CKD requiring CCVHD    Neuro: AOx3, no issues    CV: Pulmonary edema 2/2 to ADHF + volume overload in the setting of acute renal failure  TTE with evidence of severe LV dysfunction, EF 25%. Had recent stress testing that was negative, less likely ischemic. However in the setting of elevated trops + ST changes, continue with hep, asp, Brilinta atorvastatin. CE downtrending. Surgery induced cardiomyopathy? vs all in the setting of renal failure.   Continue with CVVHD with fluid removal. Continue with hydralazine, isordil and nitro for preload/afterload reduction. Pulmonary edema appears improved, able to be weaned off bipap. Continue to monitor oxy sat    Pulm: Evidence of multifocal pna on xray. Given recent hospitalization, will treat for HCAP. Follow up blood cx, procalcitonin   Wean off oxygen as tolerated    Renal: DONNIE on CKD in the setting of recent surgery, iv contrast for imaging. Follow up renal US and dopplers to assess renal artery, r/o thrombosis/occlusion/nicking. Follow up complement levels, monitor cr and electrolytes. Monitor UOP    GI: DASH diet, no issues    DVT ppx: hep

## 2018-02-05 NOTE — PROGRESS NOTE ADULT - PROBLEM SELECTOR PLAN 1
Patient with DONNIE on CKD in setting of cardiorenal syndrome: Baseline creatinine is between 1.2-1.3. Scr. on presentation was 6.48. Patient was initiated on CVVHDF on 2/5/18. Scr. today is 5.51 today. Patient is anuric. Plan is to continue with CVVHDF; can increase fluid removal to 200 cc. Check renal ultrasound with doppler to assess for renal artery stenosis. Continue to monitor Scr, electrolytes, urine output. Avoid nephrotoxins. Patient with DONNIE on CKD in setting of cardiorenal syndrome: Baseline creatinine is between 1.2-1.3. Scr. on presentation was 6.48. Patient was initiated on CVVHDF on 2/5/18. Scr. today is 5.51 today. Patient is anuric. Plan is to continue with CVVHDF; can increase fluid removal to 200 cc. Check renal ultrasound with doppler to assess for renal artery and for structural abnormalities.  Continue to monitor Scr, electrolytes, urine output. Avoid nephrotoxins.  Check complements.

## 2018-02-05 NOTE — CHART NOTE - NSCHARTNOTEFT_GEN_A_CORE
====================  CCU MIDNIGHT ROUNDS  ====================    DEUCE BALL  [id number]  Patient is a 77y old  Male who presents with a chief complaint of Shortness of Breath (04 Feb 2018 17:51)      ====================  SUMMARY:  ====================      ====================  NEW EVENTS:  ====================    MEDICATIONS  (STANDING):  acetaminophen   Tablet 650 milliGRAM(s) Oral once  aspirin enteric coated 81 milliGRAM(s) Oral daily  atorvastatin 80 milliGRAM(s) Oral at bedtime  aztreonam  IVPB 500 milliGRAM(s) IV Intermittent every 12 hours  aztreonam  IVPB      docusate sodium 100 milliGRAM(s) Oral daily  heparin  Infusion.  Unit(s)/Hr (8 mL/Hr) IV Continuous <Continuous>  hydrALAZINE 100 milliGRAM(s) Oral every 8 hours  isosorbide   dinitrate Tablet (ISORDIL) 20 milliGRAM(s) Oral three times a day  senna 1 Tablet(s) Oral at bedtime  ticagrelor 90 milliGRAM(s) Oral two times a day    MEDICATIONS  (PRN):  heparin  Injectable 4000 Unit(s) IV Push every 6 hours PRN For aPTT less than 40      ====================  VITALS (Last 12 hrs):  ====================    T(C): 36.5 (02-05-18 @ 21:00), Max: 36.5 (02-05-18 @ 21:00)  T(F): 97.7 (02-05-18 @ 21:00), Max: 97.7 (02-05-18 @ 21:00)  HR: 108 (02-05-18 @ 22:30) (82 - 108)  BP: --  BP(mean): --  ABP: 128/60 (02-05-18 @ 22:30) (120/54 - 152/72)  ABP(mean): 82 (02-05-18 @ 22:30) (74 - 96)  RR: 18 (02-05-18 @ 22:30) (16 - 25)  SpO2: 95% (02-05-18 @ 22:30) (92% - 97%)  Wt(kg): --  CVP(mm Hg): --  CVP(cm H2O): --  CO: --  CI: --  PA: --  PA(mean): --  PCWP: --  SVR: --  PVR: --    I&O's Summary    04 Feb 2018 07:01  -  05 Feb 2018 07:00  --------------------------------------------------------  IN: 1006 mL / OUT: 1799 mL / NET: -793 mL    05 Feb 2018 07:01  -  05 Feb 2018 22:48  --------------------------------------------------------  IN: 957.5 mL / OUT: 3795 mL / NET: -2837.5 mL            ====================  NEW LABS:  ====================      02-05    135  |  99  |  74<H>  ----------------------------<  203<H>  4.2   |  16<L>  |  5.51<H>    Ca    9.0      05 Feb 2018 05:05  Phos  4.5     02-05  Mg     2.2     02-05    TPro  6.8  /  Alb  3.1<L>  /  TBili  0.5  /  DBili  x   /  AST  19  /  ALT  13  /  AlkPhos  59  02-05    PT/INR - ( 04 Feb 2018 16:51 )   PT: 13.6 sec;   INR: 1.24 ratio         PTT - ( 05 Feb 2018 16:26 )  PTT:50.8 sec  Creatine Kinase, Serum: 156 U/L (02-05-18 @ 05:05)  Troponin T, Serum: 0.73 ng/mL <H> (02-05-18 @ 05:05)  Creatine Kinase, Serum: 158 U/L (02-05-18 @ 01:17)  Troponin T, Serum: 0.80 ng/mL <H> (02-05-18 @ 01:17)    CKMB Units: 14.0 ng/mL (02-05 @ 05:05)  CKMB Units: 15.2 ng/mL (02-05 @ 01:17)    ABG - ( 05 Feb 2018 09:22 )  pH: 7.46  /  pCO2: 27    /  pO2: 113   / HCO3: 20    / Base Excess: -3.0  /  SaO2: 99                  ====================  PLAN:  ====================  -       Lety Dyer CCU NP  Beeper #[id number]  Spectra # [phone] ====================  CCU MIDNIGHT ROUNDS  ====================    DEUCE BALL  [id number]  Patient is a 77y old  Male who presents with a chief complaint of Shortness of Breath (04 Feb 2018 17:51)    ====================  SUMMARY:  ====================  This is a 77 year old man with past medical history of AAA 5.5cm s/p EVAAR repair with stents on 1/29 with incidental finding of Right Neoplasm, previous history of Left Neoplasm with Left Nephrectomy in 2008, Bladder Cancer, Known LBBB, HTN, HLD transferred from [facility] after initially presenting with SOB which began last night. Patient states that he was discharged from the hospital following AAA repair and recovering well.  He was walking with walker at home with no complaints.  After going to bed, he was awoken from his sleep complaining of SOB with no relief.  He went to [facility] wtih his SOB getting progressively worse with 1 episode of vomitus before going to hospital.  Patient denies fever, chills, recent sick contact, Chest pain, Abdominal pain, diarrhea.  At [facility], paient with elevate d-dimers and transferred for possible PE.  At [facility], patient with  RICKI in V2-V4 with elevated Trops, and ADHF requiring Bipap.    ====================  NEW EVENTS:  ====================  Now off BIPAP, tolerating NC.  Off Tridil drip, now on Isordil and Hydralazine.  Remains on CVVHDF    MEDICATIONS  (STANDING):  acetaminophen   Tablet 650 milliGRAM(s) Oral once  aspirin enteric coated 81 milliGRAM(s) Oral daily  atorvastatin 80 milliGRAM(s) Oral at bedtime  aztreonam  IVPB 500 milliGRAM(s) IV Intermittent every 12 hours  aztreonam  IVPB      docusate sodium 100 milliGRAM(s) Oral daily  heparin  Infusion.  Unit(s)/Hr (8 mL/Hr) IV Continuous <Continuous>  hydrALAZINE 100 milliGRAM(s) Oral every 8 hours  isosorbide   dinitrate Tablet (ISORDIL) 20 milliGRAM(s) Oral three times a day  senna 1 Tablet(s) Oral at bedtime  ticagrelor 90 milliGRAM(s) Oral two times a day    MEDICATIONS  (PRN):  heparin  Injectable 4000 Unit(s) IV Push every 6 hours PRN For aPTT less than 40      ====================  VITALS (Last 12 hrs):  ====================    T(C): 36.5 (02-05-18 @ 21:00), Max: 36.5 (02-05-18 @ 21:00)  T(F): 97.7 (02-05-18 @ 21:00), Max: 97.7 (02-05-18 @ 21:00)  HR: 108 (02-05-18 @ 22:30) (82 - 108)  BP: --  BP(mean): --  ABP: 128/60 (02-05-18 @ 22:30) (120/54 - 152/72)  ABP(mean): 82 (02-05-18 @ 22:30) (74 - 96)  RR: 18 (02-05-18 @ 22:30) (16 - 25)  SpO2: 95% (02-05-18 @ 22:30) (92% - 97%)  Wt(kg): --  CVP(mm Hg): --  CVP(cm H2O): --  CO: --  CI: --  PA: --  PA(mean): --  PCWP: --  SVR: --  PVR: --    I&O's Summary    04 Feb 2018 07:01  -  05 Feb 2018 07:00  --------------------------------------------------------  IN: 1006 mL / OUT: 1799 mL / NET: -793 mL    05 Feb 2018 07:01  -  05 Feb 2018 22:48  --------------------------------------------------------  IN: 957.5 mL / OUT: 3795 mL / NET: -2837.5 mL    ====================  NEW LABS:  ====================    02-05    135  |  99  |  74<H>  ----------------------------<  203<H>  4.2   |  16<L>  |  5.51<H>    Ca    9.0      05 Feb 2018 05:05  Phos  4.5     02-05  Mg     2.2     02-05    TPro  6.8  /  Alb  3.1<L>  /  TBili  0.5  /  DBili  x   /  AST  19  /  ALT  13  /  AlkPhos  59  02-05    PT/INR - ( 04 Feb 2018 16:51 )   PT: 13.6 sec;   INR: 1.24 ratio      PTT - ( 05 Feb 2018 16:26 )  PTT:50.8 sec  Creatine Kinase, Serum: 156 U/L (02-05-18 @ 05:05)  Troponin T, Serum: 0.73 ng/mL <H> (02-05-18 @ 05:05)  Creatine Kinase, Serum: 158 U/L (02-05-18 @ 01:17)  Troponin T, Serum: 0.80 ng/mL <H> (02-05-18 @ 01:17)    CKMB Units: 14.0 ng/mL (02-05 @ 05:05)  CKMB Units: 15.2 ng/mL (02-05 @ 01:17)    ABG - ( 05 Feb 2018 09:22 )  pH: 7.46  /  pCO2: 27    /  pO2: 113   / HCO3: 20    / Base Excess: -3.0  /  SaO2: 99        ====================  PLAN:  ====================  - Continue Heparin ACS protocol for a total of 48 hr  - Continue DAPT  - Continue Hydralazine and Isordil;  Uptitrate Isordil as tolerated  - Continue CVVH with fluid removal per renal  - Monitor I & O's; monitor renal function; Avoid nephrotoxics  - Renal sono  - Follow up blood cultures.  patient remains with leukocytosis and elevated procalcitonin (7.35)  - Continue Abx    Ninole Nobleton CCU NP  Beeper #[id number]  Spectra # [phone]

## 2018-02-05 NOTE — PROGRESS NOTE ADULT - ASSESSMENT
77 M presents with donnie on ckd. s/p EVAAR repair on 1/29 with incidental finding of Right Neoplasm, previous history of Left Neoplasm with Left Nephrectomy in 2009 found to have DONNIE on CKD in setting of fluid overload; initiated on CVVHDF on 2/4/18.

## 2018-02-05 NOTE — PROGRESS NOTE ADULT - SUBJECTIVE AND OBJECTIVE BOX
Patient is a 77y old  Male who presents with a chief complaint of Shortness of Breath (04 Feb 2018 17:51)    Overnight Events:   HPI:  This is a 77 year old man with past medical history of AAA 5.5cm s/p EVAAR repair with stents on 1/29 with incidental finding of Right Renal Neoplasm, previous history of Left Renal Neoplasm with Left Nephrectomy in 2008, Bladder Cancer, Known LBBB, HTN, HLD transferred from [facility] after initially presenting with SOB which began last night. Patient states that he was discharged from the hospital following AAA repair and recovering well.  He was walking with walker at home with no complaints.  After going to bed, he was awoken from his sleep complaining of SOB with no relief.  He went to [facility] wtih his SOB getting progressively worse with 1 episode of vomitus before going to hospital.  Patient denies fever, chills, recent sick contact, Chest pain, Abdominal pain, diarrhea.  At [place], paient with elevate d-dimers and transferred for possible PE.  At [facility], patient with  RICKI in V2-V4 with elevated Trops, and ADHF requiring Bipap. (04 Feb 2018 17:51)    MEDICATIONS  (STANDING):  aspirin enteric coated 81 milliGRAM(s) Oral daily  atorvastatin 80 milliGRAM(s) Oral at bedtime  aztreonam  IVPB 500 milliGRAM(s) IV Intermittent every 12 hours  aztreonam  IVPB      heparin  Infusion.  Unit(s)/Hr (8 mL/Hr) IV Continuous <Continuous>  hydrALAZINE 75 milliGRAM(s) Oral every 8 hours  isosorbide   dinitrate Tablet (ISORDIL) 10 milliGRAM(s) Oral every 8 hours  nitroglycerin  Infusion 50 MICROgram(s)/Min (15 mL/Hr) IV Continuous <Continuous>  ticagrelor 90 milliGRAM(s) Oral two times a day    MEDICATIONS  (PRN):  heparin  Injectable 4000 Unit(s) IV Push every 6 hours PRN For aPTT less than 40      REVIEW OF SYSTEMS:  Otherwise, 10 point ROS done and otherwise negative.    PHYSICAL EXAM:  Vital Signs Last 24 Hrs  T(C): 36.1 (05 Feb 2018 07:30), Max: 37.2 (04 Feb 2018 18:45)  T(F): 97 (05 Feb 2018 07:30), Max: 99 (04 Feb 2018 18:45)  HR: 88 (05 Feb 2018 08:30) (86 - 133)  BP: 128/85 (05 Feb 2018 03:30) (124/90 - 181/109)  BP(mean): 100 (05 Feb 2018 03:30) (100 - 133)  RR: 19 (05 Feb 2018 08:30) (15 - 38)  SpO2: 97% (05 Feb 2018 08:30) (74% - 100%)  I&O's Summary    04 Feb 2018 07:01  -  05 Feb 2018 07:00  --------------------------------------------------------  IN: 1006 mL / OUT: 1799 mL / NET: -793 mL    05 Feb 2018 07:01  -  05 Feb 2018 08:40  --------------------------------------------------------  IN: 70 mL / OUT: 260 mL / NET: -190 mL        Appearance: Normal	  HEENT:   Normal oral mucosa, PERRL, EOMI	  Lymphatic: No lymphadenopathy  Cardiovascular: Normal S1 S2, No JVD, No murmurs, No edema  Respiratory: Lungs clear to auscultation	  Psychiatry: A & O x 3, Mood & affect appropriate  Gastrointestinal:  Soft, Non-tender, + BS	  Skin: No rashes, No ecchymoses, No cyanosis	  Neurologic: Non-focal  Extremities: Normal range of motion, No clubbing, cyanosis or edema  Vascular: Peripheral pulses palpable 2+ bilaterally    LABS:	 	                        10.5   20.2  )-----------( 271      ( 05 Feb 2018 05:05 )             29.5     Auto Eosinophil # 0.0   / Auto Eosinophil % 0.2   / Auto Neutrophil # 17.2  / Auto Neutrophil % 85.0  / BANDS % x                            10.4   20.1  )-----------( 293      ( 05 Feb 2018 01:17 )             28.9     Auto Eosinophil # x     / Auto Eosinophil % x     / Auto Neutrophil # x     / Auto Neutrophil % x     / BANDS % x                            10.8   22.1  )-----------( 312      ( 04 Feb 2018 20:39 )             29.7     Auto Eosinophil # 0.0   / Auto Eosinophil % 0.0   / Auto Neutrophil # 19.4  / Auto Neutrophil % 88.1  / BANDS % x        INR: 1.24 ratio (02-04 @ 16:51)  INR: 1.21 ratio (02-04 @ 12:13)    02-05    135  |  99  |  74<H>  ----------------------------<  203<H>  4.2   |  16<L>  |  5.51<H>  02-05    134<L>  |  99  |  82<H>  ----------------------------<  208<H>  4.9   |  14<L>  |  6.20<H>  02-04    134<L>  |  100  |  85<H>  ----------------------------<  210<H>  5.7<H>   |  11<L>  |  6.73<H>    Ca    9.0      05 Feb 2018 05:05  Mg     2.2     02-05  Phos  4.5     02-05  TPro  6.8  /  Alb  3.1<L>  /  TBili  0.5  /  DBili  x   /  AST  19  /  ALT  13  /  AlkPhos  59  02-05  TPro  6.7  /  Alb  3.1<L>  /  TBili  0.5  /  DBili  x   /  AST  19  /  ALT  12  /  AlkPhos  57  02-05  TPro  6.8  /  Alb  3.0<L>  /  TBili  0.5  /  DBili  x   /  AST  22  /  ALT  14  /  AlkPhos  59  02-04        proBNP: Serum Pro-Brain Natriuretic Peptide: 18418 pg/mL (02-04 @ 12:13)    Lipid Profile: 02-04 Chol 145 LDL 87 HDL 26<L> Trig 162<H>  HgA1c: 5.4 % (02-04 @ 21:37)    TSH: Thyroid Stimulating Hormone, Serum: 3.08 uIU/mL (02-04 @ 21:37)      CARDIAC MARKERS:   05 Feb 2018 01:17 Troponin 0.80 ng/mL / Creatine Kinase 158 U/L /  CKMB 15.2 ng/mL / CPK Mass Assay % 9.6 %   04 Feb 2018 16:51 Troponin 0.72 ng/mL / Creatine Kinase 151 U/L /  CKMB x     / CPK Mass Assay % x       04 Feb 2018 12:13 Troponin x     / Creatine Kinase 143 U/L /  CKMB 10.1 ng/mL / CPK Mass Assay % 7.1 %        TELEMETRY: 	    ECG:  	  RADIOLOGY:  OTHER: 	    PREVIOUS DIAGNOSTIC TESTING:    [ ] Echocardiogram:  [ ]  Catheterization:  [ ] Stress Test: Patient is a 77y old  Male who presents with a chief complaint of Shortness of Breath (04 Feb 2018 17:51)    Overnight Events: Pt tolerated CVVHD with fluid removal. Able to be weaned off bipap, no on   HPI:  This is a 77 year old man with past medical history of AAA 5.5cm s/p EVAAR repair with stents on 1/29 with incidental finding of Right Renal Neoplasm, previous history of Left Renal Neoplasm with Left Nephrectomy in 2008, Bladder Cancer, Known LBBB, HTN, HLD transferred from [facility] after initially presenting with SOB which began last night. Patient states that he was discharged from the hospital following AAA repair and recovering well.  He was walking with walker at home with no complaints.  After going to bed, he was awoken from his sleep complaining of SOB with no relief.  He went to [facility] wtih his SOB getting progressively worse with 1 episode of vomitus before going to hospital.  Patient denies fever, chills, recent sick contact, Chest pain, Abdominal pain, diarrhea.  At [place], paient with elevate d-dimers and transferred for possible PE.  At [facility], patient with  RICKI in V2-V4 with elevated Trops, and ADHF requiring Bipap. (04 Feb 2018 17:51)    MEDICATIONS  (STANDING):  aspirin enteric coated 81 milliGRAM(s) Oral daily  atorvastatin 80 milliGRAM(s) Oral at bedtime  aztreonam  IVPB 500 milliGRAM(s) IV Intermittent every 12 hours  aztreonam  IVPB      heparin  Infusion.  Unit(s)/Hr (8 mL/Hr) IV Continuous <Continuous>  hydrALAZINE 75 milliGRAM(s) Oral every 8 hours  isosorbide   dinitrate Tablet (ISORDIL) 10 milliGRAM(s) Oral every 8 hours  nitroglycerin  Infusion 50 MICROgram(s)/Min (15 mL/Hr) IV Continuous <Continuous>  ticagrelor 90 milliGRAM(s) Oral two times a day    MEDICATIONS  (PRN):  heparin  Injectable 4000 Unit(s) IV Push every 6 hours PRN For aPTT less than 40      REVIEW OF SYSTEMS:  Otherwise, 10 point ROS done and otherwise negative.    PHYSICAL EXAM:  Vital Signs Last 24 Hrs  T(C): 36.1 (05 Feb 2018 07:30), Max: 37.2 (04 Feb 2018 18:45)  T(F): 97 (05 Feb 2018 07:30), Max: 99 (04 Feb 2018 18:45)  HR: 88 (05 Feb 2018 08:30) (86 - 133)  BP: 128/85 (05 Feb 2018 03:30) (124/90 - 181/109)  BP(mean): 100 (05 Feb 2018 03:30) (100 - 133)  RR: 19 (05 Feb 2018 08:30) (15 - 38)  SpO2: 97% (05 Feb 2018 08:30) (74% - 100%)  I&O's Summary    04 Feb 2018 07:01  -  05 Feb 2018 07:00  --------------------------------------------------------  IN: 1006 mL / OUT: 1799 mL / NET: -793 mL    05 Feb 2018 07:01  -  05 Feb 2018 08:40  --------------------------------------------------------  IN: 70 mL / OUT: 260 mL / NET: -190 mL        Appearance: Normal	  HEENT:   Normal oral mucosa, PERRL, EOMI	  Lymphatic: No lymphadenopathy  Cardiovascular: Normal S1 S2, No JVD, No murmurs, No edema  Respiratory: Lungs clear to auscultation	  Psychiatry: A & O x 3, Mood & affect appropriate  Gastrointestinal:  Soft, Non-tender, + BS	  Skin: No rashes, No ecchymoses, No cyanosis	  Neurologic: Non-focal  Extremities: Normal range of motion, No clubbing, cyanosis or edema  Vascular: Peripheral pulses palpable 2+ bilaterally    LABS:	 	                        10.5   20.2  )-----------( 271      ( 05 Feb 2018 05:05 )             29.5     Auto Eosinophil # 0.0   / Auto Eosinophil % 0.2   / Auto Neutrophil # 17.2  / Auto Neutrophil % 85.0  / BANDS % x                            10.4   20.1  )-----------( 293      ( 05 Feb 2018 01:17 )             28.9     Auto Eosinophil # x     / Auto Eosinophil % x     / Auto Neutrophil # x     / Auto Neutrophil % x     / BANDS % x                            10.8   22.1  )-----------( 312      ( 04 Feb 2018 20:39 )             29.7     Auto Eosinophil # 0.0   / Auto Eosinophil % 0.0   / Auto Neutrophil # 19.4  / Auto Neutrophil % 88.1  / BANDS % x        INR: 1.24 ratio (02-04 @ 16:51)  INR: 1.21 ratio (02-04 @ 12:13)    02-05    135  |  99  |  74<H>  ----------------------------<  203<H>  4.2   |  16<L>  |  5.51<H>  02-05    134<L>  |  99  |  82<H>  ----------------------------<  208<H>  4.9   |  14<L>  |  6.20<H>  02-04    134<L>  |  100  |  85<H>  ----------------------------<  210<H>  5.7<H>   |  11<L>  |  6.73<H>    Ca    9.0      05 Feb 2018 05:05  Mg     2.2     02-05  Phos  4.5     02-05  TPro  6.8  /  Alb  3.1<L>  /  TBili  0.5  /  DBili  x   /  AST  19  /  ALT  13  /  AlkPhos  59  02-05  TPro  6.7  /  Alb  3.1<L>  /  TBili  0.5  /  DBili  x   /  AST  19  /  ALT  12  /  AlkPhos  57  02-05  TPro  6.8  /  Alb  3.0<L>  /  TBili  0.5  /  DBili  x   /  AST  22  /  ALT  14  /  AlkPhos  59  02-04        proBNP: Serum Pro-Brain Natriuretic Peptide: 41157 pg/mL (02-04 @ 12:13)    Lipid Profile: 02-04 Chol 145 LDL 87 HDL 26<L> Trig 162<H>  HgA1c: 5.4 % (02-04 @ 21:37)    TSH: Thyroid Stimulating Hormone, Serum: 3.08 uIU/mL (02-04 @ 21:37)      CARDIAC MARKERS:   05 Feb 2018 01:17 Troponin 0.80 ng/mL / Creatine Kinase 158 U/L /  CKMB 15.2 ng/mL / CPK Mass Assay % 9.6 %   04 Feb 2018 16:51 Troponin 0.72 ng/mL / Creatine Kinase 151 U/L /  CKMB x     / CPK Mass Assay % x       04 Feb 2018 12:13 Troponin x     / Creatine Kinase 143 U/L /  CKMB 10.1 ng/mL / CPK Mass Assay % 7.1 %        TELEMETRY: 	    ECG:  	  RADIOLOGY:  OTHER: 	    PREVIOUS DIAGNOSTIC TESTING:    [ ] Echocardiogram:  [ ]  Catheterization:  [ ] Stress Test: Patient is a 77y old  Male who presents with a chief complaint of Shortness of Breath (04 Feb 2018 17:51)    Overnight Events: Pt tolerated CVVHD with fluid removal. Able to be weaned off bipap, now on face mask. Remains hypertensive. Complains of some SOB, denies chest pain, abdominal pain, n/v.    HPI:  This is a 77 year old man with past medical history of AAA 5.5cm s/p EVAAR repair with stents on 1/29 with incidental finding of Right Renal Neoplasm, previous history of Left Renal Neoplasm with Left Nephrectomy in 2008, Bladder Cancer, Known LBBB, HTN, HLD transferred from [facility] after initially presenting with SOB which began last night. Patient states that he was discharged from the hospital following AAA repair and recovering well.  He was walking with walker at home with no complaints.  After going to bed, he was awoken from his sleep complaining of SOB with no relief.  He went to [facility] wtih his SOB getting progressively worse with 1 episode of vomitus before going to hospital.  Patient denies fever, chills, recent sick contact, Chest pain, Abdominal pain, diarrhea.  At [place], paient with elevate d-dimers and transferred for possible PE.  At [facility], patient with  RICKI in V2-V4 with elevated Trops, and ADHF requiring Bipap. (04 Feb 2018 17:51)    MEDICATIONS  (STANDING):  aspirin enteric coated 81 milliGRAM(s) Oral daily  atorvastatin 80 milliGRAM(s) Oral at bedtime  aztreonam  IVPB 500 milliGRAM(s) IV Intermittent every 12 hours  aztreonam  IVPB      heparin  Infusion.  Unit(s)/Hr (8 mL/Hr) IV Continuous <Continuous>  hydrALAZINE 75 milliGRAM(s) Oral every 8 hours  isosorbide   dinitrate Tablet (ISORDIL) 10 milliGRAM(s) Oral every 8 hours  nitroglycerin  Infusion 50 MICROgram(s)/Min (15 mL/Hr) IV Continuous <Continuous>  ticagrelor 90 milliGRAM(s) Oral two times a day    MEDICATIONS  (PRN):  heparin  Injectable 4000 Unit(s) IV Push every 6 hours PRN For aPTT less than 40      REVIEW OF SYSTEMS:  Otherwise, 10 point ROS done and otherwise negative.    PHYSICAL EXAM:  Vital Signs Last 24 Hrs  T(C): 36.1 (05 Feb 2018 07:30), Max: 37.2 (04 Feb 2018 18:45)  T(F): 97 (05 Feb 2018 07:30), Max: 99 (04 Feb 2018 18:45)  HR: 88 (05 Feb 2018 08:30) (86 - 133)  BP: 128/85 (05 Feb 2018 03:30) (124/90 - 181/109)  BP(mean): 100 (05 Feb 2018 03:30) (100 - 133)  RR: 19 (05 Feb 2018 08:30) (15 - 38)  SpO2: 97% (05 Feb 2018 08:30) (74% - 100%)  I&O's Summary    04 Feb 2018 07:01  -  05 Feb 2018 07:00  --------------------------------------------------------  IN: 1006 mL / OUT: 1799 mL / NET: -793 mL    05 Feb 2018 07:01  -  05 Feb 2018 08:40  --------------------------------------------------------  IN: 70 mL / OUT: 260 mL / NET: -190 mL        Appearance: Normal	  HEENT:   Normal oral mucosa, PERRL, EOMI on face mask	  Lymphatic: No lymphadenopathy  Cardiovascular: Normal S1 S2, No JVD, No murmurs, No edema  Respiratory: Diffuse rhonchi, diminished breath sounds	  Psychiatry: A & O x 3, Mood & affect appropriate  Gastrointestinal:  Soft, Non-tender, + BS	  Skin: No rashes, No ecchymoses, No cyanosis. Site of stapes at the groin clean without signs of infection	  Neurologic: Non-focal  Extremities: Normal range of motion, No clubbing, cyanosis or edema  Vascular: Peripheral pulses palpable 2+ bilaterally    LABS:	 	                        10.5   20.2  )-----------( 271      ( 05 Feb 2018 05:05 )             29.5     Auto Eosinophil # 0.0   / Auto Eosinophil % 0.2   / Auto Neutrophil # 17.2  / Auto Neutrophil % 85.0  / BANDS % x                            10.4   20.1  )-----------( 293      ( 05 Feb 2018 01:17 )             28.9     Auto Eosinophil # x     / Auto Eosinophil % x     / Auto Neutrophil # x     / Auto Neutrophil % x     / BANDS % x                            10.8   22.1  )-----------( 312      ( 04 Feb 2018 20:39 )             29.7     Auto Eosinophil # 0.0   / Auto Eosinophil % 0.0   / Auto Neutrophil # 19.4  / Auto Neutrophil % 88.1  / BANDS % x        INR: 1.24 ratio (02-04 @ 16:51)  INR: 1.21 ratio (02-04 @ 12:13)    02-05    135  |  99  |  74<H>  ----------------------------<  203<H>  4.2   |  16<L>  |  5.51<H>  02-05    134<L>  |  99  |  82<H>  ----------------------------<  208<H>  4.9   |  14<L>  |  6.20<H>  02-04    134<L>  |  100  |  85<H>  ----------------------------<  210<H>  5.7<H>   |  11<L>  |  6.73<H>    Ca    9.0      05 Feb 2018 05:05  Mg     2.2     02-05  Phos  4.5     02-05  TPro  6.8  /  Alb  3.1<L>  /  TBili  0.5  /  DBili  x   /  AST  19  /  ALT  13  /  AlkPhos  59  02-05  TPro  6.7  /  Alb  3.1<L>  /  TBili  0.5  /  DBili  x   /  AST  19  /  ALT  12  /  AlkPhos  57  02-05  TPro  6.8  /  Alb  3.0<L>  /  TBili  0.5  /  DBili  x   /  AST  22  /  ALT  14  /  AlkPhos  59  02-04        proBNP: Serum Pro-Brain Natriuretic Peptide: 13111 pg/mL (02-04 @ 12:13)    Lipid Profile: 02-04 Chol 145 LDL 87 HDL 26<L> Trig 162<H>  HgA1c: 5.4 % (02-04 @ 21:37)    TSH: Thyroid Stimulating Hormone, Serum: 3.08 uIU/mL (02-04 @ 21:37)      CARDIAC MARKERS:   05 Feb 2018 01:17 Troponin 0.80 ng/mL / Creatine Kinase 158 U/L /  CKMB 15.2 ng/mL / CPK Mass Assay % 9.6 %   04 Feb 2018 16:51 Troponin 0.72 ng/mL / Creatine Kinase 151 U/L /  CKMB x     / CPK Mass Assay % x       04 Feb 2018 12:13 Troponin x     / Creatine Kinase 143 U/L /  CKMB 10.1 ng/mL / CPK Mass Assay % 7.1 %        TELEMETRY: Reviewed, no events	    ECG: sinus tachycardia @ 108 	  RADIOLOGY: < from: Xray Chest 1 View- PORTABLE-Urgent (02.04.18 @ 21:44) >  The heart is enlarged. Left lower lobe pneumonia and/or atelectasis.   Pulmonary edema. Hemodialysis catheter is seen on the right and the tip   is superior vena cava. No pneumothorax.          < end of copied text >    OTHER: 	    PREVIOUS DIAGNOSTIC TESTING:    [x ] Echocardiogram: < from: Limited Transthoracic Echo (w/Cont) (02.05.18 @ 12:52) >  Limited study for echo contrast and LV function.  1. Severe global left ventricular systolic dysfunction with  regional variation. The distal septum, anterior wall, and  apex are akinetic. Endocardial visualization enhanced with  intravenous injection of echo contrast (Definity). No LV  thrombus seen. Ejection fraction by visual estimation is  25%.    < end of copied text >    [ ]  Catheterization:  [ ] Stress Test:

## 2018-02-06 LAB
ALBUMIN SERPL ELPH-MCNC: 3.5 G/DL — SIGNIFICANT CHANGE UP (ref 3.3–5)
ALP SERPL-CCNC: 65 U/L — SIGNIFICANT CHANGE UP (ref 40–120)
ALT FLD-CCNC: 14 U/L RC — SIGNIFICANT CHANGE UP (ref 10–45)
ANION GAP SERPL CALC-SCNC: 17 MMOL/L — SIGNIFICANT CHANGE UP (ref 5–17)
APTT BLD: 46.2 SEC — HIGH (ref 27.5–37.4)
AST SERPL-CCNC: 16 U/L — SIGNIFICANT CHANGE UP (ref 10–40)
BILIRUB SERPL-MCNC: 0.6 MG/DL — SIGNIFICANT CHANGE UP (ref 0.2–1.2)
BUN SERPL-MCNC: 40 MG/DL — HIGH (ref 7–23)
C3 SERPL-MCNC: 157 MG/DL — SIGNIFICANT CHANGE UP (ref 80–180)
C4 SERPL-MCNC: 35 MG/DL — SIGNIFICANT CHANGE UP (ref 10–45)
CALCIUM SERPL-MCNC: 9.1 MG/DL — SIGNIFICANT CHANGE UP (ref 8.4–10.5)
CHLORIDE SERPL-SCNC: 96 MMOL/L — SIGNIFICANT CHANGE UP (ref 96–108)
CO2 SERPL-SCNC: 23 MMOL/L — SIGNIFICANT CHANGE UP (ref 22–31)
CREAT SERPL-MCNC: 3.62 MG/DL — HIGH (ref 0.5–1.3)
GAS PNL BLDA: SIGNIFICANT CHANGE UP
GLUCOSE SERPL-MCNC: 150 MG/DL — HIGH (ref 70–99)
HCT VFR BLD CALC: 29.1 % — LOW (ref 39–50)
HGB BLD-MCNC: 10.6 G/DL — LOW (ref 13–17)
INR BLD: 1.33 RATIO — HIGH (ref 0.88–1.16)
LACTATE SERPL-SCNC: 0.9 MMOL/L — SIGNIFICANT CHANGE UP (ref 0.7–2)
MAGNESIUM SERPL-MCNC: 2.5 MG/DL — SIGNIFICANT CHANGE UP (ref 1.6–2.6)
MCHC RBC-ENTMCNC: 33.7 PG — SIGNIFICANT CHANGE UP (ref 27–34)
MCHC RBC-ENTMCNC: 36.3 GM/DL — HIGH (ref 32–36)
MCV RBC AUTO: 92.7 FL — SIGNIFICANT CHANGE UP (ref 80–100)
PHOSPHATE SERPL-MCNC: 3.5 MG/DL — SIGNIFICANT CHANGE UP (ref 2.5–4.5)
PLATELET # BLD AUTO: 259 K/UL — SIGNIFICANT CHANGE UP (ref 150–400)
POTASSIUM SERPL-MCNC: 4.2 MMOL/L — SIGNIFICANT CHANGE UP (ref 3.5–5.3)
POTASSIUM SERPL-SCNC: 4.2 MMOL/L — SIGNIFICANT CHANGE UP (ref 3.5–5.3)
PROT SERPL-MCNC: 7.3 G/DL — SIGNIFICANT CHANGE UP (ref 6–8.3)
PROTHROM AB SERPL-ACNC: 14.4 SEC — HIGH (ref 9.8–12.7)
RBC # BLD: 3.14 M/UL — LOW (ref 4.2–5.8)
RBC # FLD: 11.4 % — SIGNIFICANT CHANGE UP (ref 10.3–14.5)
SODIUM SERPL-SCNC: 136 MMOL/L — SIGNIFICANT CHANGE UP (ref 135–145)
VANCOMYCIN TROUGH SERPL-MCNC: 4.7 UG/ML — LOW (ref 10–20)
WBC # BLD: 19.3 K/UL — HIGH (ref 3.8–10.5)
WBC # FLD AUTO: 19.3 K/UL — HIGH (ref 3.8–10.5)

## 2018-02-06 PROCEDURE — 99233 SBSQ HOSP IP/OBS HIGH 50: CPT | Mod: GC

## 2018-02-06 PROCEDURE — 99291 CRITICAL CARE FIRST HOUR: CPT

## 2018-02-06 PROCEDURE — 93975 VASCULAR STUDY: CPT | Mod: 26

## 2018-02-06 PROCEDURE — 93010 ELECTROCARDIOGRAM REPORT: CPT

## 2018-02-06 RX ORDER — AZTREONAM 2 G
500 VIAL (EA) INJECTION EVERY 12 HOURS
Qty: 0 | Refills: 0 | Status: COMPLETED | OUTPATIENT
Start: 2018-02-06 | End: 2018-02-12

## 2018-02-06 RX ORDER — DIAZEPAM 5 MG
10 TABLET ORAL ONCE
Qty: 0 | Refills: 0 | Status: DISCONTINUED | OUTPATIENT
Start: 2018-02-06 | End: 2018-02-06

## 2018-02-06 RX ORDER — VANCOMYCIN HCL 1 G
1000 VIAL (EA) INTRAVENOUS ONCE
Qty: 0 | Refills: 0 | Status: COMPLETED | OUTPATIENT
Start: 2018-02-06 | End: 2018-02-06

## 2018-02-06 RX ORDER — DIAZEPAM 5 MG
10 TABLET ORAL
Qty: 0 | Refills: 0 | Status: DISCONTINUED | OUTPATIENT
Start: 2018-02-06 | End: 2018-02-13

## 2018-02-06 RX ORDER — VANCOMYCIN HCL 1 G
1000 VIAL (EA) INTRAVENOUS
Qty: 0 | Refills: 0 | Status: DISCONTINUED | OUTPATIENT
Start: 2018-02-06 | End: 2018-02-07

## 2018-02-06 RX ORDER — AZTREONAM 2 G
1000 VIAL (EA) INJECTION EVERY 8 HOURS
Qty: 0 | Refills: 0 | Status: DISCONTINUED | OUTPATIENT
Start: 2018-02-06 | End: 2018-02-06

## 2018-02-06 RX ORDER — ISOSORBIDE DINITRATE 5 MG/1
10 TABLET ORAL THREE TIMES A DAY
Qty: 0 | Refills: 0 | Status: DISCONTINUED | OUTPATIENT
Start: 2018-02-06 | End: 2018-02-17

## 2018-02-06 RX ORDER — AZTREONAM 2 G
VIAL (EA) INJECTION
Qty: 0 | Refills: 0 | Status: DISCONTINUED | OUTPATIENT
Start: 2018-02-06 | End: 2018-02-06

## 2018-02-06 RX ORDER — METOPROLOL TARTRATE 50 MG
12.5 TABLET ORAL
Qty: 0 | Refills: 0 | Status: DISCONTINUED | OUTPATIENT
Start: 2018-02-06 | End: 2018-02-07

## 2018-02-06 RX ORDER — AZTREONAM 2 G
1000 VIAL (EA) INJECTION ONCE
Qty: 0 | Refills: 0 | Status: COMPLETED | OUTPATIENT
Start: 2018-02-06 | End: 2018-02-06

## 2018-02-06 RX ADMIN — Medication 50 MILLIGRAM(S): at 13:48

## 2018-02-06 RX ADMIN — Medication 100 MILLIGRAM(S): at 05:22

## 2018-02-06 RX ADMIN — Medication 10 MILLIGRAM(S): at 10:35

## 2018-02-06 RX ADMIN — Medication 10 MILLIGRAM(S): at 21:13

## 2018-02-06 RX ADMIN — Medication 100 MILLIGRAM(S): at 13:48

## 2018-02-06 RX ADMIN — TICAGRELOR 90 MILLIGRAM(S): 90 TABLET ORAL at 05:22

## 2018-02-06 RX ADMIN — Medication 12.5 MILLIGRAM(S): at 10:36

## 2018-02-06 RX ADMIN — ISOSORBIDE DINITRATE 10 MILLIGRAM(S): 5 TABLET ORAL at 21:10

## 2018-02-06 RX ADMIN — ISOSORBIDE DINITRATE 10 MILLIGRAM(S): 5 TABLET ORAL at 13:48

## 2018-02-06 RX ADMIN — HEPARIN SODIUM 1400 UNIT(S)/HR: 5000 INJECTION INTRAVENOUS; SUBCUTANEOUS at 06:00

## 2018-02-06 RX ADMIN — SENNA PLUS 1 TABLET(S): 8.6 TABLET ORAL at 21:12

## 2018-02-06 RX ADMIN — Medication 100 MILLIGRAM(S): at 15:48

## 2018-02-06 RX ADMIN — Medication 10 MILLIGRAM(S): at 02:16

## 2018-02-06 RX ADMIN — Medication 50 MILLIGRAM(S): at 05:10

## 2018-02-06 RX ADMIN — ATORVASTATIN CALCIUM 80 MILLIGRAM(S): 80 TABLET, FILM COATED ORAL at 21:09

## 2018-02-06 RX ADMIN — Medication 100 MILLIGRAM(S): at 21:09

## 2018-02-06 RX ADMIN — Medication 12.5 MILLIGRAM(S): at 21:10

## 2018-02-06 RX ADMIN — Medication 250 MILLIGRAM(S): at 06:07

## 2018-02-06 RX ADMIN — Medication 81 MILLIGRAM(S): at 13:49

## 2018-02-06 RX ADMIN — ISOSORBIDE DINITRATE 10 MILLIGRAM(S): 5 TABLET ORAL at 06:10

## 2018-02-06 NOTE — PROGRESS NOTE ADULT - PROBLEM SELECTOR PLAN 1
Patient with DONNIE on CKD in setting of cardiorenal syndrome and ACE-I use: Baseline creatinine is between 1.2-1.3. Scr. on presentation was 6.48. Complements noted to be WNL. Patient with hemodynamically mediated DONNIE on CKI in setting of cardiorenal syndrome and ACE-I use? Patient was initiated on CVVHDF on 2/5/18. Scr. today is 3.62 today. Patient remains anuric. Plan is to stop CVVHDF for now and start on intermittent HD tomorrow. Check renal ultrasound with doppler to assess for renal artery and for structural abnormalities.  Continue to monitor Scr, electrolytes, urine output. Avoid nephrotoxins.

## 2018-02-06 NOTE — PROGRESS NOTE ADULT - SUBJECTIVE AND OBJECTIVE BOX
Patient is a 77y old  Male who presents with a chief complaint of Shortness of Breath (04 Feb 2018 17:51)    Event	  HPI:  This is a 77 year old man with past medical history of AAA 5.5cm s/p EVAAR repair with stents on 1/29 with incidental finding of Right Renal Neoplasm, previous history of Left Renal Neoplasm with Left Nephrectomy in 2008, Bladder Cancer, Known LBBB, HTN, HLD transferred from [facility] after initially presenting with SOB which began last night. Patient states that he was discharged from the hospital following AAA repair and recovering well.  He was walking with walker at home with no complaints.  After going to bed, he was awoken from his sleep complaining of SOB with no relief.  He went to [facility] wtih his SOB getting progressively worse with 1 episode of vomitus before going to hospital.  Patient denies fever, chills, recent sick contact, Chest pain, Abdominal pain, diarrhea.  At [place], paient with elevate d-dimers and transferred for possible PE.  At [facility], patient with  RICKI in V2-V4 with elevated Trops, and ADHF requiring Bipap. (04 Feb 2018 17:51)    MEDICATIONS  (STANDING):  aspirin enteric coated 81 milliGRAM(s) Oral daily  atorvastatin 80 milliGRAM(s) Oral at bedtime  aztreonam  IVPB 500 milliGRAM(s) IV Intermittent every 12 hours  aztreonam  IVPB      docusate sodium 100 milliGRAM(s) Oral daily  heparin  Infusion.  Unit(s)/Hr (8 mL/Hr) IV Continuous <Continuous>  hydrALAZINE 100 milliGRAM(s) Oral every 8 hours  isosorbide   dinitrate Tablet (ISORDIL) 10 milliGRAM(s) Oral three times a day  senna 1 Tablet(s) Oral at bedtime  ticagrelor 90 milliGRAM(s) Oral two times a day    MEDICATIONS  (PRN):  heparin  Injectable 4000 Unit(s) IV Push every 6 hours PRN For aPTT less than 40      REVIEW OF SYSTEMS:  Otherwise, 10 point ROS done and otherwise negative.    PHYSICAL EXAM:  Vital Signs Last 24 Hrs  T(C): 36.6 (06 Feb 2018 07:00), Max: 36.6 (06 Feb 2018 07:00)  T(F): 97.9 (06 Feb 2018 07:00), Max: 97.9 (06 Feb 2018 07:00)  HR: 106 (06 Feb 2018 07:00) (82 - 114)  BP: --  BP(mean): --  RR: 18 (06 Feb 2018 07:00) (15 - 25)  SpO2: 95% (06 Feb 2018 07:00) (92% - 97%)  I&O's Summary    05 Feb 2018 07:01  -  06 Feb 2018 07:00  --------------------------------------------------------  IN: 1611.5 mL / OUT: 5407 mL / NET: -3795.5 mL        Appearance: Normal	  HEENT:   Normal oral mucosa, PERRL, EOMI on face mask	  Lymphatic: No lymphadenopathy  Cardiovascular: Normal S1 S2, No JVD, No murmurs, No edema  Respiratory: Diffuse rhonchi, diminished breath sounds	  Psychiatry: A & O x 3, Mood & affect appropriate  Gastrointestinal:  Soft, Non-tender, + BS	  Skin: No rashes, No ecchymoses, No cyanosis. Site of stapes at the groin clean without signs of infection	  Neurologic: Non-focal  Extremities: Normal range of motion, No clubbing, cyanosis or edema  Vascular: Peripheral pulses palpable 2+ bilaterally      LABS:	 	                        10.6   19.3  )-----------( 259      ( 06 Feb 2018 05:33 )             29.1     Auto Eosinophil # x     / Auto Eosinophil % x     / Auto Neutrophil # x     / Auto Neutrophil % x     / BANDS % x                            10.6   19.1  )-----------( 285      ( 05 Feb 2018 22:57 )             29.7     Auto Eosinophil # x     / Auto Eosinophil % x     / Auto Neutrophil # x     / Auto Neutrophil % x     / BANDS % x                            10.5   20.2  )-----------( 271      ( 05 Feb 2018 05:05 )             29.5     Auto Eosinophil # 0.0   / Auto Eosinophil % 0.2   / Auto Neutrophil # 17.2  / Auto Neutrophil % 85.0  / BANDS % x        INR: 1.33 ratio (02-06 @ 05:33)  INR: 1.31 ratio (02-05 @ 22:57)  INR: 1.24 ratio (02-04 @ 16:51)    02-06    136  |  96  |  40<H>  ----------------------------<  150<H>  4.2   |  23  |  3.62<H>  02-05    135  |  96  |  45<H>  ----------------------------<  145<H>  4.2   |  21<L>  |  3.97<H>  02-05    135  |  99  |  74<H>  ----------------------------<  203<H>  4.2   |  16<L>  |  5.51<H>    Ca    9.1      06 Feb 2018 05:33  Mg     2.5     02-06  Phos  3.5     02-06  TPro  7.3  /  Alb  3.5  /  TBili  0.6  /  DBili  x   /  AST  16  /  ALT  14  /  AlkPhos  65  02-06  TPro  7.6  /  Alb  3.7  /  TBili  0.7  /  DBili  x   /  AST  20  /  ALT  13  /  AlkPhos  65  02-05  TPro  6.8  /  Alb  3.1<L>  /  TBili  0.5  /  DBili  x   /  AST  19  /  ALT  13  /  AlkPhos  59  02-05    Lactate, Blood: 0.9 mmol/L (02-06 @ 05:33)  Lactate, Blood: 1.2 mmol/L (02-05 @ 22:57)      proBNP: Serum Pro-Brain Natriuretic Peptide: 86309 pg/mL (02-04 @ 12:13)    Lipid Profile: 02-04 Chol 145 LDL 87 HDL 26<L> Trig 162<H>  HgA1c: 5.4 % (02-04 @ 21:37)    TSH: Thyroid Stimulating Hormone, Serum: 3.08 uIU/mL (02-04 @ 21:37)      CARDIAC MARKERS:   05 Feb 2018 05:05 Troponin 0.73 ng/mL / Creatine Kinase 156 U/L /  CKMB 14.0 ng/mL / CPK Mass Assay % 9.0 %   05 Feb 2018 01:17 Troponin 0.80 ng/mL / Creatine Kinase 158 U/L /  CKMB 15.2 ng/mL / CPK Mass Assay % 9.6 %   04 Feb 2018 16:51 Troponin 0.72 ng/mL / Creatine Kinase 151 U/L /  CKMB x     / CPK Mass Assay % x            TELEMETRY: Reviewed, no events	    ECG: sinus tachycardia @ 108 	  RADIOLOGY: < from: Xray Chest 1 View- PORTABLE-Urgent (02.04.18 @ 21:44) >  The heart is enlarged. Left lower lobe pneumonia and/or atelectasis.   Pulmonary edema. Hemodialysis catheter is seen on the right and the tip   is superior vena cava. No pneumothorax.          < end of copied text >    OTHER: 	    PREVIOUS DIAGNOSTIC TESTING:    [x ] Echocardiogram: < from: Limited Transthoracic Echo (w/Cont) (02.05.18 @ 12:52) >  Limited study for echo contrast and LV function.  1. Severe global left ventricular systolic dysfunction with  regional variation. The distal septum, anterior wall, and  apex are akinetic. Endocardial visualization enhanced with  intravenous injection of echo contrast (Definity). No LV  thrombus seen. Ejection fraction by visual estimation is  25%.    < end of copied text >    [ ]  Catheterization:  [ ] Stress Test: Patient is a 77y old  Male who presents with a chief complaint of Shortness of Breath (04 Feb 2018 17:51)    Event: Pt transitioned to NC, denies dyspnea. Hypotensive overnight, had a total of ~4L removed via CVVHD. Complains of feeling anxious. Otherwise denies chest pain, dizziness, n/v or abdominal pain.   	  HPI:  This is a 77 year old man with past medical history of AAA 5.5cm s/p EVAAR repair with stents on 1/29 with incidental finding of Right Renal Neoplasm, previous history of Left Renal Neoplasm with Left Nephrectomy in 2008, Bladder Cancer, Known LBBB, HTN, HLD transferred from [facility] after initially presenting with SOB which began last night. Patient states that he was discharged from the hospital following AAA repair and recovering well.  He was walking with walker at home with no complaints.  After going to bed, he was awoken from his sleep complaining of SOB with no relief.  He went to [facility] wtih his SOB getting progressively worse with 1 episode of vomitus before going to hospital.  Patient denies fever, chills, recent sick contact, Chest pain, Abdominal pain, diarrhea.  At [place], paient with elevate d-dimers and transferred for possible PE.  At [facility], patient with  RICKI in V2-V4 with elevated Trops, and ADHF requiring Bipap. (04 Feb 2018 17:51)    MEDICATIONS  (STANDING):  aspirin enteric coated 81 milliGRAM(s) Oral daily  atorvastatin 80 milliGRAM(s) Oral at bedtime  aztreonam  IVPB 500 milliGRAM(s) IV Intermittent every 12 hours  aztreonam  IVPB      docusate sodium 100 milliGRAM(s) Oral daily  heparin  Infusion.  Unit(s)/Hr (8 mL/Hr) IV Continuous <Continuous>  hydrALAZINE 100 milliGRAM(s) Oral every 8 hours  isosorbide   dinitrate Tablet (ISORDIL) 10 milliGRAM(s) Oral three times a day  senna 1 Tablet(s) Oral at bedtime  ticagrelor 90 milliGRAM(s) Oral two times a day    MEDICATIONS  (PRN):  heparin  Injectable 4000 Unit(s) IV Push every 6 hours PRN For aPTT less than 40      REVIEW OF SYSTEMS:  Otherwise, 10 point ROS done and otherwise negative.    PHYSICAL EXAM:  Vital Signs Last 24 Hrs  T(C): 36.6 (06 Feb 2018 07:00), Max: 36.6 (06 Feb 2018 07:00)  T(F): 97.9 (06 Feb 2018 07:00), Max: 97.9 (06 Feb 2018 07:00)  HR: 106 (06 Feb 2018 07:00) (82 - 114)  BP: --  BP(mean): --  RR: 18 (06 Feb 2018 07:00) (15 - 25)  SpO2: 95% (06 Feb 2018 07:00) (92% - 97%)  I&O's Summary    05 Feb 2018 07:01  -  06 Feb 2018 07:00  --------------------------------------------------------  IN: 1611.5 mL / OUT: 5407 mL / NET: -3795.5 mL        Appearance: Normal	  HEENT:   Normal oral mucosa, PERRL, EOMI on NC	  Lymphatic: No lymphadenopathy  Cardiovascular: Normal S1 S2, No JVD, No murmurs, No edema  Respiratory: Diffuse rhonchi, diminished breath sounds	  Psychiatry: A & O x 3, Mood & affect appropriate  Gastrointestinal:  Soft, Non-tender, + BS	  Skin: No rashes, No ecchymoses, No cyanosis. Site of stapes at the groin clean without signs of infection	  Neurologic: Non-focal  Extremities: Normal range of motion, No clubbing, cyanosis or edema  Vascular: Peripheral pulses palpable 2+ bilaterally      LABS:	 	                        10.6   19.3  )-----------( 259      ( 06 Feb 2018 05:33 )             29.1     Auto Eosinophil # x     / Auto Eosinophil % x     / Auto Neutrophil # x     / Auto Neutrophil % x     / BANDS % x                            10.6   19.1  )-----------( 285      ( 05 Feb 2018 22:57 )             29.7     Auto Eosinophil # x     / Auto Eosinophil % x     / Auto Neutrophil # x     / Auto Neutrophil % x     / BANDS % x                            10.5   20.2  )-----------( 271      ( 05 Feb 2018 05:05 )             29.5     Auto Eosinophil # 0.0   / Auto Eosinophil % 0.2   / Auto Neutrophil # 17.2  / Auto Neutrophil % 85.0  / BANDS % x        INR: 1.33 ratio (02-06 @ 05:33)  INR: 1.31 ratio (02-05 @ 22:57)  INR: 1.24 ratio (02-04 @ 16:51)    02-06    136  |  96  |  40<H>  ----------------------------<  150<H>  4.2   |  23  |  3.62<H>  02-05    135  |  96  |  45<H>  ----------------------------<  145<H>  4.2   |  21<L>  |  3.97<H>  02-05    135  |  99  |  74<H>  ----------------------------<  203<H>  4.2   |  16<L>  |  5.51<H>    Ca    9.1      06 Feb 2018 05:33  Mg     2.5     02-06  Phos  3.5     02-06  TPro  7.3  /  Alb  3.5  /  TBili  0.6  /  DBili  x   /  AST  16  /  ALT  14  /  AlkPhos  65  02-06  TPro  7.6  /  Alb  3.7  /  TBili  0.7  /  DBili  x   /  AST  20  /  ALT  13  /  AlkPhos  65  02-05  TPro  6.8  /  Alb  3.1<L>  /  TBili  0.5  /  DBili  x   /  AST  19  /  ALT  13  /  AlkPhos  59  02-05    Lactate, Blood: 0.9 mmol/L (02-06 @ 05:33)  Lactate, Blood: 1.2 mmol/L (02-05 @ 22:57)      proBNP: Serum Pro-Brain Natriuretic Peptide: 95347 pg/mL (02-04 @ 12:13)    Lipid Profile: 02-04 Chol 145 LDL 87 HDL 26<L> Trig 162<H>  HgA1c: 5.4 % (02-04 @ 21:37)    TSH: Thyroid Stimulating Hormone, Serum: 3.08 uIU/mL (02-04 @ 21:37)      CARDIAC MARKERS:   05 Feb 2018 05:05 Troponin 0.73 ng/mL / Creatine Kinase 156 U/L /  CKMB 14.0 ng/mL / CPK Mass Assay % 9.0 %   05 Feb 2018 01:17 Troponin 0.80 ng/mL / Creatine Kinase 158 U/L /  CKMB 15.2 ng/mL / CPK Mass Assay % 9.6 %   04 Feb 2018 16:51 Troponin 0.72 ng/mL / Creatine Kinase 151 U/L /  CKMB x     / CPK Mass Assay % x            TELEMETRY: Reviewed, no events	    ECG: sinus tachycardia @ 108 	  RADIOLOGY: < from: Xray Chest 1 View- PORTABLE-Urgent (02.04.18 @ 21:44) >  The heart is enlarged. Left lower lobe pneumonia and/or atelectasis.   Pulmonary edema. Hemodialysis catheter is seen on the right and the tip   is superior vena cava. No pneumothorax.          < end of copied text >    OTHER: 	    PREVIOUS DIAGNOSTIC TESTING:    [x ] Echocardiogram: < from: Limited Transthoracic Echo (w/Cont) (02.05.18 @ 12:52) >  Limited study for echo contrast and LV function.  1. Severe global left ventricular systolic dysfunction with  regional variation. The distal septum, anterior wall, and  apex are akinetic. Endocardial visualization enhanced with  intravenous injection of echo contrast (Definity). No LV  thrombus seen. Ejection fraction by visual estimation is  25%.    < end of copied text >    [ ]  Catheterization:  [ ] Stress Test:

## 2018-02-06 NOTE — CHART NOTE - NSCHARTNOTEFT_GEN_A_CORE
====================  CCU MIDNIGHT ROUNDS  ====================    DEUCE BALL  [id number]    ====================  SUMMARY:  ====================    76 yo M AAA (5.5 cm s/p EVAAR 1/29), R kidney neoplasm (incidental finding), prior L kidney neoplasm (L nephrectomy 2008), bladder Ca, known LBBB, HTN, HLD, transferred from [place] w/ SOB, found to have ADHF, multifocal PNA, AKA on CKD req CVV     ====================  NEW EVENTS:  ====================    CVV stopped. No CP/palpitations/SOB    ====================  VITALS (Last 12 hrs):  ====================    HR: 102 (02-06-18 @ 19:30) (86 - 108)  ABP: 118/56 (02-06-18 @ 19:30) (94/44 - 128/56)  ABP(mean): 78 (02-06-18 @ 19:30) (60 - 82)  RR: 15 (02-06-18 @ 19:30) (15 - 21)  SpO2: 94% (02-06-18 @ 19:30) (91% - 98%)    TELEMETRY: sinus to sinus tach     I&O's Summary    05 Feb 2018 07:01  -  06 Feb 2018 07:00  --------------------------------------------------------  IN: 1611.5 mL / OUT: 5407 mL / NET: -3795.5 mL    06 Feb 2018 07:01  -  06 Feb 2018 21:47  --------------------------------------------------------  IN: 229 mL / OUT: 28 mL / NET: 201 mL    ====================  PLAN:  ====================  - appears euvolemic, hydralazine/isordil for afterlaod reduction, low dose BB  - ASA, lipitor, ? ischemic eval   - vanco level in AM, c/w azactam  - anuric, CVV stopped, ? HD in AM, renal US w/ dopplers pending      Brianda Shultz [facility]/CCU  #[id number]

## 2018-02-06 NOTE — PROGRESS NOTE ADULT - ASSESSMENT
77 year old man with past medical history of AAA 5.5cm s/p EVAAR repair on 1/29 with incidental finding of Right Neoplasm, previous history of Left Neoplasm with Left Nephrectomy in 2009, Bladder Cancer, Known LBBB, HTN, HLD presents with hypoxic respiratory failure 2/2 to pulmonary edema  2/2 ADHF + multifocal PNA in the setting of DONNIE on CKD requiring CCVHD    Neuro: AOx3, no issues    CV: Pulmonary edema 2/2 to ADHF + volume overload in the setting of acute renal failure  TTE with evidence of severe LV dysfunction, EF 25%. Had recent stress testing that was negative, less likely ischemic. However in the setting of elevated trops + ST changes, continue with hep, asp, Brilinta atorvastatin. CE downtrending. Surgery induced cardiomyopathy? vs all in the setting of renal failure.   Continue with CVVHD with fluid removal. Continue with hydralazine, isordil and nitro for preload/afterload reduction. Pulmonary edema appears improved, able to be weaned off bipap. Continue to monitor oxy sat    Pulm: Evidence of multifocal pna on xray. Given recent hospitalization, will treat for HCAP. Follow up blood cx, procalcitonin   Wean off oxygen as tolerated    Renal: DONNIE on CKD in the setting of recent surgery, iv contrast for imaging. Follow up renal US and dopplers to assess renal artery, r/o thrombosis/occlusion/nicking. Follow up complement levels, monitor cr and electrolytes. Monitor UOP    GI: DASH diet, no issues    DVT ppx: hep 77 year old man with past medical history of AAA 5.5cm s/p EVAAR repair on 1/29 with incidental finding of Right Neoplasm, previous history of Left Neoplasm with Left Nephrectomy in 2009, Bladder Cancer, Known LBBB, HTN, HLD presents with hypoxic respiratory failure 2/2 to pulmonary edema  2/2 ADHF + multifocal PNA in the setting of DONNIE on CKD     Neuro: AOx3, no issues    CV: Pulmonary edema 2/2 to ADHF + volume overload in the setting of acute renal failure. Now improved s/p aggressive diuresis. Will keep net even  TTE with evidence of severe LV dysfunction, EF 25%. Had recent stress testing that was negative. Will reach out to outpatient cardiologist regarding prior TTE/imaging. Favor ischemic etiology vs  Surgery induced cardiomyopathy.  Plan for cath however renal would like to hold off on contrast as they are hopeful that renal function will recover.  Continue with aspirin  Continue with hydralazine, isordil, low dose bb  for preload/afterload reduction. Continue to monitor oxy sat    Pulm: Evidence of multifocal pna on xray. Given recent hospitalization, will treat for HCAP for total of 7 days. Blood cx NGTD  Wean off oxygen as tolerated    Renal: DONNIE on CKD in the setting of recent surgery, iv contrast for imaging. Follow up renal US and dopplers to assess renal artery, r/o thrombosis/occlusion/nicking. Will switch to intermittent HD to start tomorrow. Monitor Scr, electrolytes, UOP.    Heme/Onc: Spoke with pt's outpatient urologist Dr. Santiago. He is aware of the lesion on pt's right kidney but says given its size, does not require intervention at this time and states we should prioritize treating potential ischemic disease. He will continue to monitor the mass as outpatient.     GI: DASH diet, no issues    DVT ppx: hep

## 2018-02-06 NOTE — PROGRESS NOTE ADULT - SUBJECTIVE AND OBJECTIVE BOX
[facility] Division of Kidney Diseases & Hypertension  FOLLOW UP NOTE  [phone]--------------------------------------------------------------------------------    HPI: 77 year old man with past medical history of AAA 5.5cm s/p EVAAR repair on 1/29 with incidental finding of Right Neoplasm, previous history of Left Neoplasm with Left Nephrectomy in 2009, Bladder Cancer, Known LBBB, HTN, HLD transferred from [facility] for worsening SOB. Patient was found to have DONNIE and fluid overload and was initiated on CVVHDF on 2/4/17.    Patient notes improvement in SOB since yesterday; he is currently on room air and is tolerating it well. Patient remains anuric. Patient is currently on CVVHDF and was noted to have hypotensive episodes.       PAST HISTORY  --------------------------------------------------------------------------------  No significant changes to PMH, PSH, FHx, SHx, unless otherwise noted    ALLERGIES & MEDICATIONS  --------------------------------------------------------------------------------  Allergies    Cipro (Other)  Levaquin (Other)  penicillin (Hives)    Intolerances      Standing Inpatient Medications  aspirin enteric coated 81 milliGRAM(s) Oral daily  atorvastatin 80 milliGRAM(s) Oral at bedtime  aztreonam  IVPB      aztreonam  IVPB 1000 milliGRAM(s) IV Intermittent once  aztreonam  IVPB 1000 milliGRAM(s) IV Intermittent every 8 hours  docusate sodium 100 milliGRAM(s) Oral daily  hydrALAZINE 100 milliGRAM(s) Oral every 8 hours  isosorbide   dinitrate Tablet (ISORDIL) 10 milliGRAM(s) Oral three times a day  metoprolol     tartrate 12.5 milliGRAM(s) Oral two times a day  senna 1 Tablet(s) Oral at bedtime    PRN Inpatient Medications  diazepam    Tablet 10 milliGRAM(s) Oral two times a day PRN      REVIEW OF SYSTEMS  --------------------------------------------------------------------------------  Gen: No  fevers/chills  Skin: No rashes  Head/Eyes/Ears/Mouth: No headache; Normal hearing; Normal vision w/o blurriness  Respiratory: No dyspnea, cough, wheezing, hemoptysis  CV: No chest pain, PND, orthopnea  GI: No abdominal pain, diarrhea, constipation, nausea, vomiting  : No increased frequency, dysuria, hematuria, nocturia  MSK: No joint pain/swelling; no back pain; no edema  Neuro: No dizziness/lightheadedness, weakness, seizures, numbness, tingling      All other systems were reviewed and are negative, except as noted.    VITALS/PHYSICAL EXAM  --------------------------------------------------------------------------------  T(C): 36.6 (02-06-18 @ 07:00), Max: 36.6 (02-06-18 @ 07:00)  HR: 100 (02-06-18 @ 11:00) (82 - 114)  BP: 92/49 (02-06-18 @ 08:30) (92/49 - 92/49)  RR: 18 (02-06-18 @ 11:00) (16 - 25)  SpO2: 93% (02-06-18 @ 11:00) (91% - 97%)  Wt(kg): --  Height (cm): 175.26 (02-04-18 @ 19:00)  Weight (kg): 68 (02-04-18 @ 16:05)  BMI (kg/m2): 22.1 (02-04-18 @ 19:00)  BSA (m2): 1.83 (02-04-18 @ 19:00)      02-05-18 @ 07:01  -  02-06-18 @ 07:00  --------------------------------------------------------  IN: 1611.5 mL / OUT: 5407 mL / NET: -3795.5 mL    02-06-18 @ 07:01  -  02-06-18 @ 11:11  --------------------------------------------------------  IN: 29 mL / OUT: 28 mL / NET: 1 mL      Physical Exam:  	Gen: NAD   	HEENT: anicteric  	Pulm: lungs clear on auscultation.   	CV: RRR  	Abd: soft, nontender, nondistended  	LE: no B/L pedal edema present.   	Skin: Warm and dry   	Vascular access: right non tunneled HD catheter present.     LABS/STUDIES  --------------------------------------------------------------------------------              10.6   19.3  >-----------<  259      [02-06-18 @ 05:33]              29.1     136  |  96  |  40  ----------------------------<  150      [02-06-18 @ 05:33]  4.2   |  23  |  3.62        Ca     9.1     [02-06-18 @ 05:33]      Mg     2.5     [02-06-18 @ 05:33]      Phos  3.5     [02-06-18 @ 05:33]    TPro  7.3  /  Alb  3.5  /  TBili  0.6  /  DBili  x   /  AST  16  /  ALT  14  /  AlkPhos  65  [02-06-18 @ 05:33]    PT/INR: PT 14.4 , INR 1.33       [02-06-18 @ 05:33]  PTT: 46.2       [02-06-18 @ 05:33]    Troponin 0.73      [02-05-18 @ 05:05]        [02-05-18 @ 05:05]    Creatinine Trend:  SCr 3.62 [02-06 @ 05:33]  SCr 3.97 [02-05 @ 22:57]  SCr 5.51 [02-05 @ 05:05]  SCr 6.20 [02-05 @ 01:17]  SCr 6.73 [02-04 @ 20:39]    Urinalysis - [02-04-18 @ 13:17]      Color Yellow / Appearance Clear / SG 1.020 / pH 5.0      Gluc 50 / Ketone Negative  / Bili Negative / Urobili Negative       Blood Trace / Protein 30 / Leuk Est Trace / Nitrite Negative      RBC 0-2 / WBC 0-2 / Hyaline  / Gran  / Sq Epi  / Non Sq Epi Few / Bacteria Trace    Urine Creatinine 130      [01-31-18 @ 08:26]  Urine Sodium <20      [01-31-18 @ 08:26]    HbA1c 5.4      [02-04-18 @ 21:37]  TSH 3.08      [02-04-18 @ 21:37]  Lipid: chol 145, , HDL 26, LDL 87      [02-04-18 @ 21:37]      C3 Complement 157      [02-05-18 @ 21:15]  C4 Complement 35      [02-05-18 @ 21:15] [facility] Division of Kidney Diseases & Hypertension  FOLLOW UP NOTE  [phone]--------------------------------------------------------------------------------    HPI: 77 year old man with past medical history of AAA 5.5cm s/p EVAAR repair on 1/29 with incidental finding of Right Neoplasm, previous history of Left Neoplasm with Left Nephrectomy in 2009, Bladder Cancer, Known LBBB, HTN, HLD transferred from [facility] for worsening SOB. Patient was found to have DONNIE and fluid overload and was initiated on CVVHDF on 2/4/17.    Patient notes improvement in SOB since yesterday; he is currently on room air and is tolerating it well. Patient remains anuric. Patient is currently on CVVHDF and was noted to have hypotensive episodes.       PAST HISTORY  --------------------------------------------------------------------------------  No significant changes to PMH, PSH, FHx, SHx, unless otherwise noted    ALLERGIES & MEDICATIONS  --------------------------------------------------------------------------------  Allergies    Cipro (Other)  Levaquin (Other)  penicillin (Hives)    Intolerances      Standing Inpatient Medications  aspirin enteric coated 81 milliGRAM(s) Oral daily  atorvastatin 80 milliGRAM(s) Oral at bedtime  aztreonam  IVPB      aztreonam  IVPB 1000 milliGRAM(s) IV Intermittent once  aztreonam  IVPB 1000 milliGRAM(s) IV Intermittent every 8 hours  docusate sodium 100 milliGRAM(s) Oral daily  hydrALAZINE 100 milliGRAM(s) Oral every 8 hours  isosorbide   dinitrate Tablet (ISORDIL) 10 milliGRAM(s) Oral three times a day  metoprolol     tartrate 12.5 milliGRAM(s) Oral two times a day  senna 1 Tablet(s) Oral at bedtime    PRN Inpatient Medications  diazepam    Tablet 10 milliGRAM(s) Oral two times a day PRN      REVIEW OF SYSTEMS  --------------------------------------------------------------------------------  Gen: No  fevers/chills  Head/Eyes/Ears/Mouth: No headache  Respiratory: improved dyspnea  CV: No chest pain  GI: No abdominal pain  : still anuric    VITALS/PHYSICAL EXAM  --------------------------------------------------------------------------------  T(C): 36.6 (02-06-18 @ 07:00), Max: 36.6 (02-06-18 @ 07:00)  HR: 100 (02-06-18 @ 11:00) (82 - 114)  BP: 92/49 (02-06-18 @ 08:30) (92/49 - 92/49)  RR: 18 (02-06-18 @ 11:00) (16 - 25)  SpO2: 93% (02-06-18 @ 11:00) (91% - 97%)  Wt(kg): --  Height (cm): 175.26 (02-04-18 @ 19:00)  Weight (kg): 68 (02-04-18 @ 16:05)  BMI (kg/m2): 22.1 (02-04-18 @ 19:00)  BSA (m2): 1.83 (02-04-18 @ 19:00)      02-05-18 @ 07:01  -  02-06-18 @ 07:00  --------------------------------------------------------  IN: 1611.5 mL / OUT: 5407 mL / NET: -3795.5 mL    02-06-18 @ 07:01  -  02-06-18 @ 11:11  --------------------------------------------------------  IN: 29 mL / OUT: 28 mL / NET: 1 mL      Physical Exam:  	Gen: NAD   	HEENT: anicteric  	Pulm: lungs clear on auscultation.   	CV: RRR  	Abd: soft, nontender, nondistended  	LE: no B/L pedal edema present.   	Skin: Warm and dry   	Vascular access: right non tunneled HD catheter present.     LABS/STUDIES  --------------------------------------------------------------------------------              10.6   19.3  >-----------<  259      [02-06-18 @ 05:33]              29.1     136  |  96  |  40  ----------------------------<  150      [02-06-18 @ 05:33]  4.2   |  23  |  3.62        Ca     9.1     [02-06-18 @ 05:33]      Mg     2.5     [02-06-18 @ 05:33]      Phos  3.5     [02-06-18 @ 05:33]    TPro  7.3  /  Alb  3.5  /  TBili  0.6  /  DBili  x   /  AST  16  /  ALT  14  /  AlkPhos  65  [02-06-18 @ 05:33]    PT/INR: PT 14.4 , INR 1.33       [02-06-18 @ 05:33]  PTT: 46.2       [02-06-18 @ 05:33]    Troponin 0.73      [02-05-18 @ 05:05]        [02-05-18 @ 05:05]    Creatinine Trend:  SCr 3.62 [02-06 @ 05:33]  SCr 3.97 [02-05 @ 22:57]  SCr 5.51 [02-05 @ 05:05]  SCr 6.20 [02-05 @ 01:17]  SCr 6.73 [02-04 @ 20:39]    Urinalysis - [02-04-18 @ 13:17]      Color Yellow / Appearance Clear / SG 1.020 / pH 5.0      Gluc 50 / Ketone Negative  / Bili Negative / Urobili Negative       Blood Trace / Protein 30 / Leuk Est Trace / Nitrite Negative      RBC 0-2 / WBC 0-2 / Hyaline  / Gran  / Sq Epi  / Non Sq Epi Few / Bacteria Trace    Urine Creatinine 130      [01-31-18 @ 08:26]  Urine Sodium <20      [01-31-18 @ 08:26]    HbA1c 5.4      [02-04-18 @ 21:37]  TSH 3.08      [02-04-18 @ 21:37]  Lipid: chol 145, , HDL 26, LDL 87      [02-04-18 @ 21:37]      C3 Complement 157      [02-05-18 @ 21:15]  C4 Complement 35      [02-05-18 @ 21:15]

## 2018-02-07 ENCOUNTER — MESSAGE (OUTPATIENT)
Age: 78
End: 2018-02-07

## 2018-02-07 DIAGNOSIS — I71.4 ABDOMINAL AORTIC ANEURYSM, WITHOUT RUPTURE: ICD-10-CM

## 2018-02-07 DIAGNOSIS — Z01.818 ENCOUNTER FOR OTHER PREPROCEDURAL EXAMINATION: ICD-10-CM

## 2018-02-07 LAB
ALBUMIN SERPL ELPH-MCNC: 3.2 G/DL — LOW (ref 3.3–5)
ALP SERPL-CCNC: 56 U/L — SIGNIFICANT CHANGE UP (ref 40–120)
ALT FLD-CCNC: 11 U/L RC — SIGNIFICANT CHANGE UP (ref 10–45)
ANION GAP SERPL CALC-SCNC: 16 MMOL/L — SIGNIFICANT CHANGE UP (ref 5–17)
APTT BLD: 24.5 SEC — LOW (ref 27.5–37.4)
AST SERPL-CCNC: 13 U/L — SIGNIFICANT CHANGE UP (ref 10–40)
BILIRUB SERPL-MCNC: 0.5 MG/DL — SIGNIFICANT CHANGE UP (ref 0.2–1.2)
BUN SERPL-MCNC: 59 MG/DL — HIGH (ref 7–23)
CALCIUM SERPL-MCNC: 8.7 MG/DL — SIGNIFICANT CHANGE UP (ref 8.4–10.5)
CHLORIDE SERPL-SCNC: 96 MMOL/L — SIGNIFICANT CHANGE UP (ref 96–108)
CO2 SERPL-SCNC: 22 MMOL/L — SIGNIFICANT CHANGE UP (ref 22–31)
CREAT SERPL-MCNC: 5.74 MG/DL — HIGH (ref 0.5–1.3)
GLUCOSE SERPL-MCNC: 123 MG/DL — HIGH (ref 70–99)
HCT VFR BLD CALC: 25.5 % — LOW (ref 39–50)
HGB BLD-MCNC: 9.2 G/DL — LOW (ref 13–17)
INR BLD: 1.17 RATIO — HIGH (ref 0.88–1.16)
MAGNESIUM SERPL-MCNC: 2.9 MG/DL — HIGH (ref 1.6–2.6)
MCHC RBC-ENTMCNC: 34.1 PG — HIGH (ref 27–34)
MCHC RBC-ENTMCNC: 36.1 GM/DL — HIGH (ref 32–36)
MCV RBC AUTO: 94.3 FL — SIGNIFICANT CHANGE UP (ref 80–100)
PHOSPHATE SERPL-MCNC: 3.4 MG/DL — SIGNIFICANT CHANGE UP (ref 2.5–4.5)
PLATELET # BLD AUTO: 212 K/UL — SIGNIFICANT CHANGE UP (ref 150–400)
POTASSIUM SERPL-MCNC: 3.8 MMOL/L — SIGNIFICANT CHANGE UP (ref 3.5–5.3)
POTASSIUM SERPL-SCNC: 3.8 MMOL/L — SIGNIFICANT CHANGE UP (ref 3.5–5.3)
PROT SERPL-MCNC: 6.5 G/DL — SIGNIFICANT CHANGE UP (ref 6–8.3)
PROTHROM AB SERPL-ACNC: 12.7 SEC — SIGNIFICANT CHANGE UP (ref 9.8–12.7)
RBC # BLD: 2.71 M/UL — LOW (ref 4.2–5.8)
RBC # FLD: 11.6 % — SIGNIFICANT CHANGE UP (ref 10.3–14.5)
SODIUM SERPL-SCNC: 134 MMOL/L — LOW (ref 135–145)
VANCOMYCIN TROUGH SERPL-MCNC: 14.5 UG/ML — SIGNIFICANT CHANGE UP (ref 10–20)
WBC # BLD: 12.6 K/UL — HIGH (ref 3.8–10.5)
WBC # FLD AUTO: 12.6 K/UL — HIGH (ref 3.8–10.5)

## 2018-02-07 PROCEDURE — 99233 SBSQ HOSP IP/OBS HIGH 50: CPT | Mod: GC

## 2018-02-07 PROCEDURE — 93010 ELECTROCARDIOGRAM REPORT: CPT

## 2018-02-07 RX ORDER — METOPROLOL TARTRATE 50 MG
2.5 TABLET ORAL ONCE
Qty: 0 | Refills: 0 | Status: COMPLETED | OUTPATIENT
Start: 2018-02-07 | End: 2018-02-07

## 2018-02-07 RX ORDER — HEPARIN SODIUM 5000 [USP'U]/ML
1000 INJECTION INTRAVENOUS; SUBCUTANEOUS
Qty: 25000 | Refills: 0 | Status: DISCONTINUED | OUTPATIENT
Start: 2018-02-07 | End: 2018-02-08

## 2018-02-07 RX ORDER — HEPARIN SODIUM 5000 [USP'U]/ML
5500 INJECTION INTRAVENOUS; SUBCUTANEOUS EVERY 6 HOURS
Qty: 0 | Refills: 0 | Status: DISCONTINUED | OUTPATIENT
Start: 2018-02-07 | End: 2018-02-08

## 2018-02-07 RX ORDER — METOPROLOL TARTRATE 50 MG
5 TABLET ORAL ONCE
Qty: 0 | Refills: 0 | Status: COMPLETED | OUTPATIENT
Start: 2018-02-07 | End: 2018-02-07

## 2018-02-07 RX ORDER — HEPARIN SODIUM 5000 [USP'U]/ML
2500 INJECTION INTRAVENOUS; SUBCUTANEOUS EVERY 6 HOURS
Qty: 0 | Refills: 0 | Status: DISCONTINUED | OUTPATIENT
Start: 2018-02-07 | End: 2018-02-08

## 2018-02-07 RX ORDER — METOPROLOL TARTRATE 50 MG
25 TABLET ORAL
Qty: 0 | Refills: 0 | Status: DISCONTINUED | OUTPATIENT
Start: 2018-02-07 | End: 2018-02-09

## 2018-02-07 RX ADMIN — Medication 2.5 MILLIGRAM(S): at 20:38

## 2018-02-07 RX ADMIN — Medication 12.5 MILLIGRAM(S): at 10:11

## 2018-02-07 RX ADMIN — Medication 5 MILLIGRAM(S): at 20:54

## 2018-02-07 RX ADMIN — Medication 100 MILLIGRAM(S): at 11:42

## 2018-02-07 RX ADMIN — Medication 100 MILLIGRAM(S): at 23:37

## 2018-02-07 RX ADMIN — Medication 2.5 MILLIGRAM(S): at 20:28

## 2018-02-07 RX ADMIN — Medication 100 MILLIGRAM(S): at 14:50

## 2018-02-07 RX ADMIN — ISOSORBIDE DINITRATE 10 MILLIGRAM(S): 5 TABLET ORAL at 11:42

## 2018-02-07 RX ADMIN — ISOSORBIDE DINITRATE 10 MILLIGRAM(S): 5 TABLET ORAL at 23:37

## 2018-02-07 RX ADMIN — Medication 10 MILLIGRAM(S): at 11:42

## 2018-02-07 RX ADMIN — HEPARIN SODIUM 1000 UNIT(S)/HR: 5000 INJECTION INTRAVENOUS; SUBCUTANEOUS at 21:23

## 2018-02-07 RX ADMIN — Medication 25 MILLIGRAM(S): at 21:20

## 2018-02-07 RX ADMIN — Medication 50 MILLIGRAM(S): at 00:49

## 2018-02-07 RX ADMIN — Medication 50 MILLIGRAM(S): at 13:16

## 2018-02-07 RX ADMIN — ATORVASTATIN CALCIUM 80 MILLIGRAM(S): 80 TABLET, FILM COATED ORAL at 21:21

## 2018-02-07 RX ADMIN — Medication 81 MILLIGRAM(S): at 11:42

## 2018-02-07 RX ADMIN — Medication 100 MILLIGRAM(S): at 05:02

## 2018-02-07 RX ADMIN — Medication 5 MILLIGRAM(S): at 21:55

## 2018-02-07 RX ADMIN — SENNA PLUS 1 TABLET(S): 8.6 TABLET ORAL at 23:37

## 2018-02-07 NOTE — DIETITIAN INITIAL EVALUATION ADULT. - ORAL INTAKE PTA
poor/Pt had poor appetite and po intakes since 2 weeks ago, reports consuming only spoonful of meals since 1 week ago.

## 2018-02-07 NOTE — PROGRESS NOTE ADULT - PROBLEM SELECTOR PLAN 1
Patient with DONNIE on CKD in setting of cardiorenal syndrome and ACE-I use: Baseline creatinine is between 1.2-1.3. Scr. on presentation was 6.48. Complements noted to be WNL. Patient with hemodynamically mediated DONNIE on CKI in setting of cardiorenal syndrome and ACE-I use? Patient was initiated on CVVHDF on 2/5/18 and stopped yesterday. Scr. has worsened to 5.74 today.  Patient remains anuric. Will arrange for HD today.  Check renal ultrasound with doppler to assess for renal artery and for structural abnormalities. Patient will need cardiac catheterization as per primary team. Patient could have chance of renal recovery however in this case, benefits outweigh the risks. Will need to discuss with patient regarding risks and benefits of cardiac cath.   Continue to monitor Scr, electrolytes, urine output. Avoid nephrotoxins. Patient with DONNIE on CKD in setting of cardiorenal syndrome and ACE-I use: Baseline creatinine is between 1.2-1.3. Scr. on presentation was 6.48. Complements noted to be WNL. Patient with hemodynamically mediated DONNIE on CKI in setting of cardiorenal syndrome and ACE-I use? Patient was initiated on CVVHDF on 2/5/18 and stopped yesterday. Scr. has worsened to 5.74 today.  Patient remains anuric. Will arrange for HD today.  Patient will need cardiac catheterization as per primary team. Patient could have chance of renal recovery however in this case, benefits outweigh the risks. Will need to discuss with patient regarding risks and benefits of cardiac cath.   Continue to monitor Scr, electrolytes, urine output. Avoid nephrotoxins.

## 2018-02-07 NOTE — PROGRESS NOTE ADULT - SUBJECTIVE AND OBJECTIVE BOX
[facility] Division of Kidney Diseases & Hypertension  FOLLOW UP NOTE  [phone]--------------------------------------------------------------------------------    HPI: 77 year old man with past medical history of AAA 5.5cm s/p EVAAR repair on 1/29 with incidental finding of Right Neoplasm, previous history of Left Neoplasm with Left Nephrectomy in 2009, Bladder Cancer, Known LBBB, HTN, HLD transferred from [facility] for worsening SOB. Patient was found to have DONNIE and fluid overload and was initiated on CVVHDF on 2/4/17 and was stopped on 2/6/18.     Patient seen and examined in CCU. Currently patient denies complains of SOB, CP, abdominal pain, nausea, vomiting.      PAST HISTORY  --------------------------------------------------------------------------------  No significant changes to PMH, PSH, FHx, SHx, unless otherwise noted    ALLERGIES & MEDICATIONS  --------------------------------------------------------------------------------  Allergies    Cipro (Other)  Levaquin (Other)  penicillin (Hives)    Intolerances      Standing Inpatient Medications  aspirin enteric coated 81 milliGRAM(s) Oral daily  atorvastatin 80 milliGRAM(s) Oral at bedtime  aztreonam  IVPB 500 milliGRAM(s) IV Intermittent every 12 hours  docusate sodium 100 milliGRAM(s) Oral daily  hydrALAZINE 100 milliGRAM(s) Oral every 8 hours  isosorbide   dinitrate Tablet (ISORDIL) 10 milliGRAM(s) Oral three times a day  metoprolol     tartrate 12.5 milliGRAM(s) Oral two times a day  senna 1 Tablet(s) Oral at bedtime    PRN Inpatient Medications  diazepam    Tablet 10 milliGRAM(s) Oral two times a day PRN      REVIEW OF SYSTEMS  --------------------------------------------------------------------------------  Gen: No  fevers/chills  Head/Eyes/Ears/Mouth: No headache  Respiratory:no dyspnea  CV: No chest pain  GI: No abdominal pain  : still anuric      VITALS/PHYSICAL EXAM  --------------------------------------------------------------------------------  T(C): 36.4 (02-07-18 @ 07:01), Max: 37 (02-07-18 @ 01:00)  HR: 102 (02-07-18 @ 09:00) (86 - 116)  BP: --  RR: 16 (02-07-18 @ 09:00) (8 - 25)  SpO2: 95% (02-07-18 @ 09:00) (91% - 98%)  Wt(kg): --        02-06-18 @ 07:01  -  02-07-18 @ 07:00  --------------------------------------------------------  IN: 229 mL / OUT: 28 mL / NET: 201 mL      Physical Exam:  	Gen: NAD   	HEENT: anicteric  	Pulm: lungs clear on auscultation.   	CV: RRR  	Abd: soft, nontender, nondistended  	LE: no B/L pedal edema present.   	Skin: Warm and dry   	Vascular access: right non tunneled HD catheter present.     LABS/STUDIES  --------------------------------------------------------------------------------              9.2    12.6  >-----------<  212      [02-07-18 @ 03:30]              25.5     134  |  96  |  59  ----------------------------<  123      [02-07-18 @ 03:30]  3.8   |  22  |  5.74        Ca     8.7     [02-07-18 @ 03:30]      Mg     2.9     [02-07-18 @ 03:30]      Phos  3.4     [02-07-18 @ 03:30]    TPro  6.5  /  Alb  3.2  /  TBili  0.5  /  DBili  x   /  AST  13  /  ALT  11  /  AlkPhos  56  [02-07-18 @ 03:30]    PT/INR: PT 14.4 , INR 1.33       [02-06-18 @ 05:33]  PTT: 46.2       [02-06-18 @ 05:33]      Creatinine Trend:  SCr 5.74 [02-07 @ 03:30]  SCr 3.62 [02-06 @ 05:33]  SCr 3.97 [02-05 @ 22:57]  SCr 5.51 [02-05 @ 05:05]  SCr 6.20 [02-05 @ 01:17]    Urinalysis - [02-04-18 @ 13:17]      Color Yellow / Appearance Clear / SG 1.020 / pH 5.0      Gluc 50 / Ketone Negative  / Bili Negative / Urobili Negative       Blood Trace / Protein 30 / Leuk Est Trace / Nitrite Negative      RBC 0-2 / WBC 0-2 / Hyaline  / Gran  / Sq Epi  / Non Sq Epi Few / Bacteria Trace      HbA1c 5.4      [02-04-18 @ 21:37]  TSH 3.08      [02-04-18 @ 21:37]  Lipid: chol 145, , HDL 26, LDL 87      [02-04-18 @ 21:37]      C3 Complement 157      [02-05-18 @ 21:15]  C4 Complement 35      [02-05-18 @ 21:15]

## 2018-02-07 NOTE — DIETITIAN INITIAL EVALUATION ADULT. - DIET TYPE
renal replacement pts:no protein restr,no conc K & phos, low sodium/DASH/TLC (sodium and cholesterol restricted diet)/1000ml

## 2018-02-07 NOTE — CHART NOTE - NSCHARTNOTEFT_GEN_A_CORE
Upon Nutritional Assessment by the Registered Dietitian your patient was determined to meet criteria / has evidence of the following diagnosis/diagnoses:          [ ]  Mild Protein Calorie Malnutrition        [ ]  Moderate Protein Calorie Malnutrition        [x ] Severe Protein Calorie Malnutrition        [ ] Unspecified Protein Calorie Malnutrition        [ ] Underweight / BMI <19        [ ] Morbid Obesity / BMI > 40      Findings as based on:  [x ] Comprehensive nutrition assessment   [ ] Nutrition Focused Physical Exam  [x ] Other: unintended wt loss of 19lbs (11.5%) 2/2 poor po intakes x 2 weeks       Nutrition Plan/Recommendations:  Recommend diet change to Renal and Nepro 2 x day        PROVIDER Section:     By signing this assessment you are acknowledging and agree with the diagnosis/diagnoses assigned by the Registered Dietitian    Comments:

## 2018-02-07 NOTE — CHART NOTE - NSCHARTNOTEFT_GEN_A_CORE
====================  CCU MIDNIGHT ROUNDS  ====================    DEUCE BALL  [id number]    ====================  SUMMARY:  ====================    78 yo M AAA (5.5 cm s/p EVAAR 1/29), R kidney neoplasm (incidental finding), prior L kidney neoplasm (L nephrectomy 2008), bladder Ca, known LBBB, HTN, HLD, transferred from [facility] w/ SOB, found to have ADHF, multifocal PNA, AKA on CKD req CVV (stopped 1200 2/6)   ====================  NEW EVENTS:  ====================    w/ new AF ib RVR as high as 160s-170s @ beginning of HD. BP stable. Per HD nurse, nephrologist okay to proceed w/ slower and less fluid removal as pt getting rate control agents. Given lopressor 2.5 mg IVP x 2, lopressor 5 mg IVP x 2, heparin gtt initiated. PO lopressor inc to 25 BID. Rate 100s-130s. BP stable     ====================  VITALS (Last 12 hrs):  ====================    T(C): 36.9 (02-07-18 @ 20:00), Max: 37.2 (02-07-18 @ 19:30)  HR: 120 (02-07-18 @ 23:00) (96 - 140)  ABP: 110/62 (02-07-18 @ 23:00) (102/50 - 138/60)  ABP(mean): 78 (02-07-18 @ 23:00) (66 - 88)  RR: 23 (02-07-18 @ 23:00) (15 - 27)  SpO2: 95% (02-07-18 @ 23:00) (92% - 96%)    TELEMETRY: a fib     I&O's Summary    06 Feb 2018 07:01  -  07 Feb 2018 07:00  --------------------------------------------------------  IN: 229 mL / OUT: 28 mL / NET: 201 mL    07 Feb 2018 07:01  -  07 Feb 2018 23:27  --------------------------------------------------------  IN: 20 mL / OUT: 0 mL / NET: 20 mL    ====================  PLAN:  ====================  - c/w ABX for PNA   - fluid removal per renal   - a fib - uptitrate BB as tolerated, may need amio if rate uncontrolled, heparin gtt for AC, no s/s bleeding, [id number]  - eval of coronaries and renal artery    Brianda Shultz [facility]/CC  #[id number]

## 2018-02-07 NOTE — DIETITIAN INITIAL EVALUATION ADULT. - OTHER INFO
Pt seen for LOS in CCU, reports UBW of 165 pounds and thinks he may have lost some wt recently 2/2 recent EVAAR surgery and decreased po intakes. Per previous H&P, pt wt was 168 pounds (3/24/2016), current wt is 149 pounds. Pt remains with poor appetite and po intakes, observed 0% of lunch and 0% noted per flowsheet. Pt reports last BM 3 days ago, just took stool softener today, no other GI distress at this time. NKFA. PTA takes niacin. Pt denies chewing/swallowing difficulties at this time.

## 2018-02-07 NOTE — DIETITIAN INITIAL EVALUATION ADULT. - PERTINENT LABORATORY DATA
Na 134 [135 - 145], K+ 3.8 [3.5 - 5.3], BUN 59 [7 - 23], Cr 5.74 [0.50 - 1.30],  [70 - 99], Phos 3.4 [2.5 - 4.5], Alk Phos 56 [40 - 120], AST 13 [10 - 40], ALT 11 [10 - 45], Mg 2.9 [1.6 - 2.6], Ca 8.7 [8.4 - 10.5]

## 2018-02-07 NOTE — PROGRESS NOTE ADULT - SUBJECTIVE AND OBJECTIVE BOX
Patient is a 77y old  Male who presents with a chief complaint of Shortness of Breath (04 Feb 2018 17:51)    Event: No acute events overnight. Pt was weaned off NC, on room air denies dyspnea. Had not made urine, bladder scan this am showed an empty bladder. Pt denies chest pain, LE edema, n/v or abdominal pain    	  HPI:  This is a 77 year old man with past medical history of AAA 5.5cm s/p EVAAR repair with stents on 1/29 with incidental finding of Right Renal Neoplasm, previous history of Left Renal Neoplasm with Left Nephrectomy in 2008, Bladder Cancer, Known LBBB, HTN, HLD transferred from [facility] after initially presenting with SOB which began last night. Patient states that he was discharged from the hospital following AAA repair and recovering well.  He was walking with walker at home with no complaints.  After going to bed, he was awoken from his sleep complaining of SOB with no relief.  He went to [facility] wtih his SOB getting progressively worse with 1 episode of vomitus before going to hospital.  Patient denies fever, chills, recent sick contact, Chest pain, Abdominal pain, diarrhea.  At [place], paient with elevate d-dimers and transferred for possible PE.  At [facility], patient with  RICKI in V2-V4 with elevated Trops, and ADHF requiring Bipap. (04 Feb 2018 17:51)    MEDICATIONS  (STANDING):  aspirin enteric coated 81 milliGRAM(s) Oral daily  atorvastatin 80 milliGRAM(s) Oral at bedtime  aztreonam  IVPB 500 milliGRAM(s) IV Intermittent every 12 hours  docusate sodium 100 milliGRAM(s) Oral daily  hydrALAZINE 100 milliGRAM(s) Oral every 8 hours  isosorbide   dinitrate Tablet (ISORDIL) 10 milliGRAM(s) Oral three times a day  metoprolol     tartrate 12.5 milliGRAM(s) Oral two times a day  senna 1 Tablet(s) Oral at bedtime    MEDICATIONS  (PRN):  diazepam    Tablet 10 milliGRAM(s) Oral two times a day PRN anxiety      REVIEW OF SYSTEMS:  Otherwise, 10 point ROS done and otherwise negative.    PHYSICAL EXAM:  Vital Signs Last 24 Hrs  T(C): 36.4 (07 Feb 2018 07:01), Max: 37 (07 Feb 2018 01:00)  T(F): 97.6 (07 Feb 2018 07:01), Max: 98.6 (07 Feb 2018 01:00)  HR: 98 (07 Feb 2018 08:00) (86 - 116)  BP: 92/49 (06 Feb 2018 08:30) (92/49 - 92/49)  BP(mean): 63 (06 Feb 2018 08:30) (63 - 63)  RR: 21 (07 Feb 2018 08:00) (8 - 25)  SpO2: 93% (07 Feb 2018 08:00) (91% - 98%)  I&O's Summary    06 Feb 2018 07:01  -  07 Feb 2018 07:00  --------------------------------------------------------  IN: 229 mL / OUT: 28 mL / NET: 201 mL      Appearance: in NAD	  HEENT:   Normal oral mucosa, PERRL, EOMI. Right IJ cath clean and intact 	  Lymphatic: No lymphadenopathy  Cardiovascular: Normal S1 S2, No JVD, No murmurs, No edema  Respiratory: mild crackles at the bases, diminished breath sounds	  Psychiatry: A & O x 3, Mood & affect appropriate  Gastrointestinal:  Soft, Non-tender, + BS	  Skin: No rashes, No ecchymoses, No cyanosis. Site of stapes at the groin clean without signs of infection	  Neurologic: Non-focal  Extremities: Normal range of motion, No clubbing, cyanosis or edema  Vascular: Peripheral pulses palpable 2+ bilaterally    LABS:	 	                        9.2    12.6  )-----------( 212      ( 07 Feb 2018 03:30 )             25.5     Auto Eosinophil # x     / Auto Eosinophil % x     / Auto Neutrophil # x     / Auto Neutrophil % x     / BANDS % x                            10.6   19.3  )-----------( 259      ( 06 Feb 2018 05:33 )             29.1     Auto Eosinophil # x     / Auto Eosinophil % x     / Auto Neutrophil # x     / Auto Neutrophil % x     / BANDS % x                            10.6   19.1  )-----------( 285      ( 05 Feb 2018 22:57 )             29.7     Auto Eosinophil # x     / Auto Eosinophil % x     / Auto Neutrophil # x     / Auto Neutrophil % x     / BANDS % x        INR: 1.33 ratio (02-06 @ 05:33)  INR: 1.31 ratio (02-05 @ 22:57)  INR: 1.24 ratio (02-04 @ 16:51)    02-07    134<L>  |  96  |  59<H>  ----------------------------<  123<H>  3.8   |  22  |  5.74<H>  02-06    136  |  96  |  40<H>  ----------------------------<  150<H>  4.2   |  23  |  3.62<H>  02-05    135  |  96  |  45<H>  ----------------------------<  145<H>  4.2   |  21<L>  |  3.97<H>    Ca    8.7      07 Feb 2018 03:30  Mg     2.9     02-07  Phos  3.4     02-07  TPro  6.5  /  Alb  3.2<L>  /  TBili  0.5  /  DBili  x   /  AST  13  /  ALT  11  /  AlkPhos  56  02-07  TPro  7.3  /  Alb  3.5  /  TBili  0.6  /  DBili  x   /  AST  16  /  ALT  14  /  AlkPhos  65  02-06  TPro  7.6  /  Alb  3.7  /  TBili  0.7  /  DBili  x   /  AST  20  /  ALT  13  /  AlkPhos  65  02-05    Lactate, Blood: 0.9 mmol/L (02-06 @ 05:33)  Lactate, Blood: 1.2 mmol/L (02-05 @ 22:57)      proBNP: Serum Pro-Brain Natriuretic Peptide: 32776 pg/mL (02-04 @ 12:13)    Lipid Profile: 02-04 Chol 145 LDL 87 HDL 26<L> Trig 162<H>  HgA1c: 5.4 % (02-04 @ 21:37)    TSH: Thyroid Stimulating Hormone, Serum: 3.08 uIU/mL (02-04 @ 21:37)      CARDIAC MARKERS:   05 Feb 2018 05:05 Troponin 0.73 ng/mL / Creatine Kinase 156 U/L /  CKMB 14.0 ng/mL / CPK Mass Assay % 9.0 %   05 Feb 2018 01:17 Troponin 0.80 ng/mL / Creatine Kinase 158 U/L /  CKMB 15.2 ng/mL / CPK Mass Assay % 9.6 %   04 Feb 2018 16:51 Troponin 0.72 ng/mL / Creatine Kinase 151 U/L /  CKMB x     / CPK Mass Assay % x            TELEMETRY: reviewed, no events	    ECG:  NSR @98	  RADIOLOGY: < from: US Duplex Kidneys (02.06.18 @ 15:51) >    High-grade stenosis at the origin of the right renal artery.    1.7 cm solid mass lower pole right kidney suspicious for primary neoplasm.    Status post left nephrectomy.      < end of copied text >    OTHER: 	    PREVIOUS DIAGNOSTIC TESTING:    [x ] Echocardiogram: reviewed  [ ]  Catheterization:  [ ] Stress Test:

## 2018-02-07 NOTE — DIETITIAN INITIAL EVALUATION ADULT. - ENERGY NEEDS
Height: 67 inches (stated), Weight: 149 pounds  BMI: 22 kg/m2 IBW: 148 pounds (+/-10%), %IBW: 101%  Pertinent Info: Per chart, 78 y/o male with PMH of left renal neoplasm with left nephrectomy in 2008, Bladder Cancer, Known LBBB, HTN, HLD, AAA s/p EVAAR repair with stents on 1/29 with incidental finding of right renal neoplasm, admitted with NSTEMI, hypoxic respiratory failure 2/2 pulmonary edema 2/2 ADHF and PNA, DONNIE on CKD3 2/2 acute right renal artery stenosis, s/p CVVHD (2/4-6), possible HD today.  No edema, no pressure ulcers noted at this time.

## 2018-02-07 NOTE — DIETITIAN INITIAL EVALUATION ADULT. - NS AS NUTRI INTERV MEALS SNACK
Recommend diet change to Renal, fluid per team. Reviewed alternate menu options and menu ordering procedure. Provide food preferences within therapeutic diet when requested. Encouraged pt to increase po intake of meals as tolerated and discussed importance of adequate nutrition intake. Pt declines softer diet consistency at this time.

## 2018-02-07 NOTE — PROGRESS NOTE ADULT - ASSESSMENT
77 year old man with past medical history of AAA 5.5cm s/p EVAAR repair on 1/29 with incidental finding of Right Neoplasm, previous history of Left Neoplasm with Left Nephrectomy in 2009, Bladder Cancer, Known LBBB, HTN, HLD presents with hypoxic respiratory failure 2/2 to pulmonary edema  2/2 ADHF + multifocal PNA in the setting of DONNIE on CKD     Neuro: AOx3, no issues    CV: Pulmonary edema 2/2 to ADHF + volume overload in the setting of acute renal failure. Now improved s/p aggressive diuresis.  TTE with evidence of severe LV dysfunction, EF 25%. Had recent stress testing that was negative. Prior TTE showed no wall motion abnormalities with an EF of ~60%.  Favor ischemic etiology vs  Surgery induced cardiomyopathy.  Plan for cath however renal would like to hold off on contrast as they are hopeful that renal function will recover.  Continue with aspirin  Continue with hydralazine, isordil, low dose bb  for preload/afterload reduction. Continue to monitor oxy sat    Pulm: Evidence of multifocal pna on xray. Given recent hospitalization, will treat for HCAP for total of 7 days. Blood cx NGTD  Wean off oxygen as tolerated    Renal: DONNIE on CKD in the setting of recent surgery, iv contrast for imaging. Renal US significant for high grade stenosis at the origin of the renal artery. Continue with intermittent HD to start tomorrow. Monitor Scr, electrolytes, UOP.    Heme/Onc: Spoke with pt's outpatient urologist Dr. Santiago. He is aware of the lesion on pt's right kidney but says given its size, does not require intervention at this time and states we should prioritize treating potential ischemic disease. He will continue to monitor the mass as outpatient.     GI: DASH diet, no issues    DVT ppx: hep 77 year old man with past medical history of AAA 5.5cm s/p EVAAR repair on 1/29 with incidental finding of Right Neoplasm, previous history of Left Neoplasm with Left Nephrectomy in 2009, Bladder Cancer, Known LBBB, HTN, HLD presents with hypoxic respiratory failure 2/2 to pulmonary edema  2/2 ADHF + multifocal PNA in the setting of DONNIE on CKD     Neuro: AOx3, no issues    CV: Pulmonary edema 2/2 to ADHF + volume overload in the setting of acute renal failure. Now improved s/p aggressive diuresis.  TTE with evidence of severe LV dysfunction, EF 25%. Had recent stress testing that was negative. Prior TTE showed no wall motion abnormalities with an EF of ~60%.  Favor ischemic etiology vs  Surgery induced cardiomyopathy.  Plan for cath however renal would like to hold off on contrast as they are hopeful that renal function will recover.  Continue with aspirin  Continue with hydralazine, isordil, low dose bb  for preload/afterload reduction. Continue to monitor oxy sat    Pulm: Evidence of multifocal pna on xray. Given recent hospitalization, will treat for HCAP for total of 7 days. Blood cx NGTD  Wean off oxygen as tolerated    Renal: DONNIE on CKD in the setting of recent surgery, iv contrast for imaging. Renal US significant for high grade stenosis at the origin of the renal artery. Continue with intermittent HD to start tomorrow. Monitor Scr, electrolytes, UOP.    Heme/Onc: Spoke with pt's outpatient urologist Dr. Santiago. He is aware of the lesion on pt's right kidney but says given its size, does not require intervention at this time and states we should prioritize treating potential ischemic disease. He will continue to monitor the mass as outpatient.     ID: Pt initially presented with severe sepsis likely 2/2 to PNA with signs of end organ dysfunction (donnie, elevated trops) now improving s/p abx. WBC downtrending    GI: DASH diet, no issues    DVT ppx: hep

## 2018-02-07 NOTE — DIETITIAN INITIAL EVALUATION ADULT. - PROBLEM SELECTOR PLAN 2
TTE shows hypokinesis of septal and anterolateral walls, large bilateral pleural effusions.  No Pericardial Effusion. No right heart strain  Pro-BNP 39607+  Continue tridil gtt for afterload reduction  Lasix 80mg now

## 2018-02-08 LAB
ALBUMIN SERPL ELPH-MCNC: 3 G/DL — LOW (ref 3.3–5)
ALP SERPL-CCNC: 55 U/L — SIGNIFICANT CHANGE UP (ref 40–120)
ALT FLD-CCNC: 16 U/L RC — SIGNIFICANT CHANGE UP (ref 10–45)
ANION GAP SERPL CALC-SCNC: 15 MMOL/L — SIGNIFICANT CHANGE UP (ref 5–17)
APTT BLD: 41.7 SEC — HIGH (ref 27.5–37.4)
AST SERPL-CCNC: 20 U/L — SIGNIFICANT CHANGE UP (ref 10–40)
BILIRUB SERPL-MCNC: 0.4 MG/DL — SIGNIFICANT CHANGE UP (ref 0.2–1.2)
BUN SERPL-MCNC: 52 MG/DL — HIGH (ref 7–23)
CALCIUM SERPL-MCNC: 8.4 MG/DL — SIGNIFICANT CHANGE UP (ref 8.4–10.5)
CHLORIDE SERPL-SCNC: 95 MMOL/L — LOW (ref 96–108)
CO2 SERPL-SCNC: 24 MMOL/L — SIGNIFICANT CHANGE UP (ref 22–31)
CREAT SERPL-MCNC: 5.92 MG/DL — HIGH (ref 0.5–1.3)
GLUCOSE SERPL-MCNC: 138 MG/DL — HIGH (ref 70–99)
HBV SURFACE AB SER-ACNC: <3 MIU/ML — LOW
HBV SURFACE AG SER-ACNC: SIGNIFICANT CHANGE UP
HCT VFR BLD CALC: 23.9 % — LOW (ref 39–50)
HCV AB S/CO SERPL IA: 0.11 S/CO — SIGNIFICANT CHANGE UP
HCV AB SERPL-IMP: SIGNIFICANT CHANGE UP
HGB BLD-MCNC: 8.6 G/DL — LOW (ref 13–17)
INR BLD: 1.25 RATIO — HIGH (ref 0.88–1.16)
MAGNESIUM SERPL-MCNC: 2.5 MG/DL — SIGNIFICANT CHANGE UP (ref 1.6–2.6)
MCHC RBC-ENTMCNC: 33.9 PG — SIGNIFICANT CHANGE UP (ref 27–34)
MCHC RBC-ENTMCNC: 36 GM/DL — SIGNIFICANT CHANGE UP (ref 32–36)
MCV RBC AUTO: 94 FL — SIGNIFICANT CHANGE UP (ref 80–100)
PHOSPHATE SERPL-MCNC: 3.3 MG/DL — SIGNIFICANT CHANGE UP (ref 2.5–4.5)
PLATELET # BLD AUTO: 141 K/UL — LOW (ref 150–400)
POTASSIUM SERPL-MCNC: 3.9 MMOL/L — SIGNIFICANT CHANGE UP (ref 3.5–5.3)
POTASSIUM SERPL-SCNC: 3.9 MMOL/L — SIGNIFICANT CHANGE UP (ref 3.5–5.3)
PROT SERPL-MCNC: 6.3 G/DL — SIGNIFICANT CHANGE UP (ref 6–8.3)
PROTHROM AB SERPL-ACNC: 13.6 SEC — HIGH (ref 9.8–12.7)
RBC # BLD: 2.54 M/UL — LOW (ref 4.2–5.8)
RBC # FLD: 11.3 % — SIGNIFICANT CHANGE UP (ref 10.3–14.5)
SODIUM SERPL-SCNC: 134 MMOL/L — LOW (ref 135–145)
VANCOMYCIN TROUGH SERPL-MCNC: 10.6 UG/ML — SIGNIFICANT CHANGE UP (ref 10–20)
VANCOMYCIN TROUGH SERPL-MCNC: 28.1 UG/ML — CRITICAL HIGH (ref 10–20)
WBC # BLD: 11.5 K/UL — HIGH (ref 3.8–10.5)
WBC # FLD AUTO: 11.5 K/UL — HIGH (ref 3.8–10.5)

## 2018-02-08 PROCEDURE — 99152 MOD SED SAME PHYS/QHP 5/>YRS: CPT

## 2018-02-08 PROCEDURE — 93010 ELECTROCARDIOGRAM REPORT: CPT

## 2018-02-08 PROCEDURE — 99233 SBSQ HOSP IP/OBS HIGH 50: CPT | Mod: GC

## 2018-02-08 PROCEDURE — 99233 SBSQ HOSP IP/OBS HIGH 50: CPT

## 2018-02-08 PROCEDURE — 76937 US GUIDE VASCULAR ACCESS: CPT | Mod: 26

## 2018-02-08 PROCEDURE — 93567 NJX CAR CTH SPRVLV AORTGRPHY: CPT

## 2018-02-08 PROCEDURE — 93458 L HRT ARTERY/VENTRICLE ANGIO: CPT | Mod: 26

## 2018-02-08 PROCEDURE — 93456 R HRT CORONARY ARTERY ANGIO: CPT | Mod: 26

## 2018-02-08 PROCEDURE — 93010 ELECTROCARDIOGRAM REPORT: CPT | Mod: 77

## 2018-02-08 PROCEDURE — 75625 CONTRAST EXAM ABDOMINL AORTA: CPT | Mod: 26,XU

## 2018-02-08 RX ORDER — POLYETHYLENE GLYCOL 3350 17 G/17G
17 POWDER, FOR SOLUTION ORAL DAILY
Qty: 0 | Refills: 0 | Status: DISCONTINUED | OUTPATIENT
Start: 2018-02-08 | End: 2018-02-22

## 2018-02-08 RX ORDER — HEPARIN SODIUM 5000 [USP'U]/ML
2500 INJECTION INTRAVENOUS; SUBCUTANEOUS EVERY 6 HOURS
Qty: 0 | Refills: 0 | Status: DISCONTINUED | OUTPATIENT
Start: 2018-02-08 | End: 2018-02-09

## 2018-02-08 RX ORDER — VANCOMYCIN HCL 1 G
1250 VIAL (EA) INTRAVENOUS ONCE
Qty: 0 | Refills: 0 | Status: COMPLETED | OUTPATIENT
Start: 2018-02-08 | End: 2018-02-08

## 2018-02-08 RX ORDER — FENTANYL CITRATE 50 UG/ML
125 INJECTION INTRAVENOUS ONCE
Qty: 0 | Refills: 0 | Status: DISCONTINUED | OUTPATIENT
Start: 2018-02-08 | End: 2018-02-08

## 2018-02-08 RX ORDER — GLYCERIN ADULT
1 SUPPOSITORY, RECTAL RECTAL ONCE
Qty: 0 | Refills: 0 | Status: COMPLETED | OUTPATIENT
Start: 2018-02-08 | End: 2018-02-08

## 2018-02-08 RX ORDER — HEPARIN SODIUM 5000 [USP'U]/ML
5500 INJECTION INTRAVENOUS; SUBCUTANEOUS EVERY 6 HOURS
Qty: 0 | Refills: 0 | Status: DISCONTINUED | OUTPATIENT
Start: 2018-02-08 | End: 2018-02-09

## 2018-02-08 RX ORDER — FENTANYL CITRATE 50 UG/ML
50 INJECTION INTRAVENOUS ONCE
Qty: 0 | Refills: 0 | Status: DISCONTINUED | OUTPATIENT
Start: 2018-02-08 | End: 2018-02-08

## 2018-02-08 RX ORDER — FENTANYL CITRATE 50 UG/ML
25 INJECTION INTRAVENOUS ONCE
Qty: 0 | Refills: 0 | Status: DISCONTINUED | OUTPATIENT
Start: 2018-02-08 | End: 2018-02-08

## 2018-02-08 RX ORDER — HEPARIN SODIUM 5000 [USP'U]/ML
INJECTION INTRAVENOUS; SUBCUTANEOUS
Qty: 25000 | Refills: 0 | Status: DISCONTINUED | OUTPATIENT
Start: 2018-02-08 | End: 2018-02-09

## 2018-02-08 RX ADMIN — FENTANYL CITRATE 50 MICROGRAM(S): 50 INJECTION INTRAVENOUS at 16:45

## 2018-02-08 RX ADMIN — Medication 81 MILLIGRAM(S): at 14:45

## 2018-02-08 RX ADMIN — ATORVASTATIN CALCIUM 80 MILLIGRAM(S): 80 TABLET, FILM COATED ORAL at 21:05

## 2018-02-08 RX ADMIN — Medication 10 MILLIGRAM(S): at 00:02

## 2018-02-08 RX ADMIN — Medication 166.67 MILLIGRAM(S): at 02:45

## 2018-02-08 RX ADMIN — ISOSORBIDE DINITRATE 10 MILLIGRAM(S): 5 TABLET ORAL at 05:09

## 2018-02-08 RX ADMIN — SENNA PLUS 1 TABLET(S): 8.6 TABLET ORAL at 21:05

## 2018-02-08 RX ADMIN — FENTANYL CITRATE 25 MICROGRAM(S): 50 INJECTION INTRAVENOUS at 16:35

## 2018-02-08 RX ADMIN — Medication 50 MILLIGRAM(S): at 14:46

## 2018-02-08 RX ADMIN — HEPARIN SODIUM 1100 UNIT(S)/HR: 5000 INJECTION INTRAVENOUS; SUBCUTANEOUS at 05:09

## 2018-02-08 RX ADMIN — FENTANYL CITRATE 50 MICROGRAM(S): 50 INJECTION INTRAVENOUS at 16:25

## 2018-02-08 RX ADMIN — Medication 100 MILLIGRAM(S): at 14:46

## 2018-02-08 RX ADMIN — Medication 25 MILLIGRAM(S): at 20:56

## 2018-02-08 RX ADMIN — FENTANYL CITRATE 25 MICROGRAM(S): 50 INJECTION INTRAVENOUS at 16:45

## 2018-02-08 RX ADMIN — Medication 100 MILLIGRAM(S): at 21:05

## 2018-02-08 RX ADMIN — ISOSORBIDE DINITRATE 10 MILLIGRAM(S): 5 TABLET ORAL at 14:46

## 2018-02-08 RX ADMIN — ISOSORBIDE DINITRATE 10 MILLIGRAM(S): 5 TABLET ORAL at 21:05

## 2018-02-08 RX ADMIN — Medication 1 SUPPOSITORY(S): at 22:55

## 2018-02-08 RX ADMIN — FENTANYL CITRATE 50 MICROGRAM(S): 50 INJECTION INTRAVENOUS at 16:20

## 2018-02-08 RX ADMIN — Medication 50 MILLIGRAM(S): at 00:04

## 2018-02-08 RX ADMIN — Medication 10 MILLIGRAM(S): at 21:10

## 2018-02-08 RX ADMIN — Medication 100 MILLIGRAM(S): at 05:09

## 2018-02-08 RX ADMIN — Medication 25 MILLIGRAM(S): at 05:09

## 2018-02-08 NOTE — CONSULT NOTE ADULT - ASSESSMENT
Assessment    1.	New R renal artery stenosis  2.	CHF, acute, systolic (r/o stress-induced cardiomyopathy)  3.	DONNIE on CKD, newly-initiated dialysis  4.	AAA, s/p recent EVAAR  5.	New-onset atrial fibrillation, back in SR, CHADS2-Vasc = 2  6.	L renal mass, /sp L nephrectomy  7.	Hypertension  8.	Hyperlipidemia    Plan    1.	Patient consented for diagnostic coronary and peripheral angiogram today by Dr. Garg.  2.	Dialysis as per renal.  3.	Continue anticoagulation with heparin gtt.      Robson Diaz MD  [phone]

## 2018-02-08 NOTE — PROGRESS NOTE ADULT - PROBLEM SELECTOR PLAN 2
Resolved. Serum K is 3.8 today. Low potassium diet recommended. Monitor serum K level.  No emergent HD today

## 2018-02-08 NOTE — PROGRESS NOTE ADULT - SUBJECTIVE AND OBJECTIVE BOX
Patient is a 77y old  Male who presents with a chief complaint of Shortness of Breath (04 Feb 2018 17:51)    Event: Last night pt went into new afib with RVR as high as 160s-170s @ beginning of HD. BP stable. Given lopressor 2.5 mg IVP x 2, lopressor 5 mg IVP x 2, heparin gtt initiated given high CHADSVASC score. PO lopressor inc to 25 BID. Pt converted back to sinus. This morning denies chest pain, SOB, palpitations or abdominal pain.   	  HPI:  This is a 77 year old man with past medical history of AAA 5.5cm s/p EVAAR repair with stents on 1/29 with incidental finding of Right Renal Neoplasm, previous history of Left Renal Neoplasm with Left Nephrectomy in 2008, Bladder Cancer, Known LBBB, HTN, HLD transferred from [facility] after initially presenting with SOB which began last night. Patient states that he was discharged from the hospital following AAA repair and recovering well.  He was walking with walker at home with no complaints.  After going to bed, he was awoken from his sleep complaining of SOB with no relief.  He went to [facility] wtih his SOB getting progressively worse with 1 episode of vomitus before going to hospital.  Patient denies fever, chills, recent sick contact, Chest pain, Abdominal pain, diarrhea.  At [place], paient with elevate d-dimers and transferred for possible PE.  At [facility], patient with  RICKI in V2-V4 with elevated Trops, and ADHF requiring Bipap. (04 Feb 2018 17:51)    MEDICATIONS  (STANDING):  aspirin enteric coated 81 milliGRAM(s) Oral daily  atorvastatin 80 milliGRAM(s) Oral at bedtime  aztreonam  IVPB 500 milliGRAM(s) IV Intermittent every 12 hours  docusate sodium 100 milliGRAM(s) Oral daily  heparin  Infusion. 1000 Unit(s)/Hr (10 mL/Hr) IV Continuous <Continuous>  hydrALAZINE 100 milliGRAM(s) Oral every 8 hours  isosorbide   dinitrate Tablet (ISORDIL) 10 milliGRAM(s) Oral three times a day  metoprolol     tartrate 25 milliGRAM(s) Oral two times a day  senna 1 Tablet(s) Oral at bedtime    MEDICATIONS  (PRN):  diazepam    Tablet 10 milliGRAM(s) Oral two times a day PRN anxiety  heparin  Injectable 5500 Unit(s) IV Push every 6 hours PRN For aPTT less than 40  heparin  Injectable 2500 Unit(s) IV Push every 6 hours PRN For aPTT between 40 - 57      REVIEW OF SYSTEMS:  Otherwise, 10 point ROS done and otherwise negative.    PHYSICAL EXAM:  Vital Signs Last 24 Hrs  T(C): 36.7 (08 Feb 2018 05:00), Max: 37.2 (07 Feb 2018 19:30)  T(F): 98.1 (08 Feb 2018 05:00), Max: 99 (07 Feb 2018 19:30)  HR: 86 (08 Feb 2018 08:00) (86 - 140)  BP: --  BP(mean): --  RR: 19 (08 Feb 2018 08:00) (15 - 27)  SpO2: 96% (08 Feb 2018 08:00) (92% - 96%)  I&O's Summary    07 Feb 2018 07:01  -  08 Feb 2018 07:00  --------------------------------------------------------  IN: 151 mL / OUT: 470 mL / NET: -319 mL        Appearance: in NAD	  HEENT:   Normal oral mucosa, PERRL, EOMI. Right IJ cath clean and intact 	  Lymphatic: No lymphadenopathy  Cardiovascular: Normal S1 S2, No JVD, No murmurs, No edema  Respiratory: mild crackles at the bases, diminished breath sounds	  Psychiatry: A & O x 3, Mood & affect appropriate  Gastrointestinal:  Soft, Non-tender, + BS	  Skin: No rashes, No ecchymoses, No cyanosis. Site of stapes at the groin clean without signs of infection	  Neurologic: Non-focal  Extremities: Normal range of motion, No clubbing, cyanosis or edema  Vascular: Peripheral pulses palpable 2+ bilaterally    LABS:	 	                        8.6    11.5  )-----------( 141      ( 08 Feb 2018 03:39 )             23.9     Auto Eosinophil # x     / Auto Eosinophil % x     / Auto Neutrophil # x     / Auto Neutrophil % x     / BANDS % x                            9.2    12.6  )-----------( 212      ( 07 Feb 2018 03:30 )             25.5     Auto Eosinophil # x     / Auto Eosinophil % x     / Auto Neutrophil # x     / Auto Neutrophil % x     / BANDS % x        INR: 1.25 ratio (02-08 @ 03:39)  INR: 1.17 ratio (02-07 @ 21:05)  INR: 1.33 ratio (02-06 @ 05:33)    02-08    134<L>  |  95<L>  |  52<H>  ----------------------------<  138<H>  3.9   |  24  |  5.92<H>  02-07    134<L>  |  96  |  59<H>  ----------------------------<  123<H>  3.8   |  22  |  5.74<H>    Ca    8.4      08 Feb 2018 03:38  Mg     2.5     02-08  Phos  3.3     02-08  TPro  6.3  /  Alb  3.0<L>  /  TBili  0.4  /  DBili  x   /  AST  20  /  ALT  16  /  AlkPhos  55  02-08  TPro  6.5  /  Alb  3.2<L>  /  TBili  0.5  /  DBili  x   /  AST  13  /  ALT  11  /  AlkPhos  56  02-07    Lactate, Blood: 0.9 mmol/L (02-06 @ 05:33)  Lactate, Blood: 1.2 mmol/L (02-05 @ 22:57)      proBNP: Serum Pro-Brain Natriuretic Peptide: 68714 pg/mL (02-04 @ 12:13)    Lipid Profile: 02-04 Chol 145 LDL 87 HDL 26<L> Trig 162<H>  HgA1c: 5.4 % (02-04 @ 21:37)    TSH: Thyroid Stimulating Hormone, Serum: 3.08 uIU/mL (02-04 @ 21:37)      CARDIAC MARKERS:   05 Feb 2018 05:05 Troponin 0.73 ng/mL / Creatine Kinase 156 U/L /  CKMB 14.0 ng/mL / CPK Mass Assay % 9.0 %   05 Feb 2018 01:17 Troponin 0.80 ng/mL / Creatine Kinase 158 U/L /  CKMB 15.2 ng/mL / CPK Mass Assay % 9.6 %   04 Feb 2018 16:51 Troponin 0.72 ng/mL / Creatine Kinase 151 U/L /  CKMB x     / CPK Mass Assay % x            TELEMETRY: afib with RVR to rate of 170s	    ECG: NSR @ 90 	  RADIOLOGY:  OTHER: 	    PREVIOUS DIAGNOSTIC TESTING:    [x ] Echocardiogram: reviewed  [ ]  Catheterization:  [ ] Stress Test:

## 2018-02-08 NOTE — CHART NOTE - NSCHARTNOTEFT_GEN_A_CORE
Removal of Femoral Sheath    Pulses in the (right) lower extremity are (audible by doppler). The patient was placed in the supine position. SBP was 140-150.  A total of 150mcg fentanyl IVP was give for pain prior to removal of sheath.  (previous EVAAR site in right groin). The insertion site was identified and the sutures were removed per protocol.  The _6 Italian LONG, femoral sheath was then removed. Direct pressure was applied for  _20_ minutes.     Monitoring of the (right) groin and both lower extremities including neuro-vascular checks and vital signs every 15 minutes x 4, then every 30 minutes x 2, then every 1 hour was ordered.    Complications: None    Zari Garcia [facility]  [id number]

## 2018-02-08 NOTE — PROGRESS NOTE ADULT - SUBJECTIVE AND OBJECTIVE BOX
[facility] Division of Kidney Diseases & Hypertension  FOLLOW UP NOTE  --------------------------------------------------------------------------------  HPI: 77 year old man with past medical history of AAA 5.5cm s/p EVAAR repair on 1/29 with incidental finding of Right Neoplasm, previous history of Left Neoplasm with Left Nephrectomy in 2009 transferred from [facility] for worsening SOB. Patient was found to have DONNIE and fluid overload and was initiated on CVVHDF on 2/4/17 and was stopped on 2/6/18.  Had rapid afib with HD yesterday.  Ultrasound suggestive of high grade right renal artery narrowing.  On evaluation today the patient is still not urinating.  His breathing is better and he does not have any chest pain.  He feels nervous about his current situation.    PAST HISTORY  --------------------------------------------------------------------------------  No significant changes to PMH, PSH, FHx, SHx, unless otherwise noted    ALLERGIES & MEDICATIONS  --------------------------------------------------------------------------------  Allergies    Cipro (Other)  Levaquin (Other)  penicillin (Hives)    Intolerances      Standing Inpatient Medications  aspirin enteric coated 81 milliGRAM(s) Oral daily  atorvastatin 80 milliGRAM(s) Oral at bedtime  aztreonam  IVPB 500 milliGRAM(s) IV Intermittent every 12 hours  docusate sodium 100 milliGRAM(s) Oral daily  heparin  Infusion. 1000 Unit(s)/Hr IV Continuous <Continuous>  hydrALAZINE 100 milliGRAM(s) Oral every 8 hours  isosorbide   dinitrate Tablet (ISORDIL) 10 milliGRAM(s) Oral three times a day  metoprolol     tartrate 25 milliGRAM(s) Oral two times a day  senna 1 Tablet(s) Oral at bedtime    PRN Inpatient Medications  diazepam    Tablet 10 milliGRAM(s) Oral two times a day PRN  heparin  Injectable 5500 Unit(s) IV Push every 6 hours PRN  heparin  Injectable 2500 Unit(s) IV Push every 6 hours PRN      REVIEW OF SYSTEMS  --------------------------------------------------------------------------------  as noted above    VITALS/PHYSICAL EXAM  --------------------------------------------------------------------------------  T(C): 36.8 (02-08-18 @ 08:00), Max: 37.2 (02-07-18 @ 19:30)  HR: 92 (02-08-18 @ 10:00) (86 - 140)  BP: 126/58  RR: 12 (02-08-18 @ 10:00) (12 - 27)  SpO2: 95% (02-08-18 @ 10:00) (92% - 96%)  Wt(kg): --        02-07-18 @ 07:01  -  02-08-18 @ 07:00  --------------------------------------------------------  IN: 162 mL / OUT: 470 mL / NET: -308 mL    02-08-18 @ 07:01 - 02-08-18 @ 10:39  --------------------------------------------------------  IN: 273 mL / OUT: 0 mL / NET: 273 mL      Physical Exam:  	Gen: NAD   	HEENT: anicteric  	Pulm: lungs clear on auscultation.   	CV: S1 and S2   	Abd: soft, nontender, nondistended  	LE: no B/L pedal edema present.   	Skin: Warm and dry   	Vascular access: right IJ non tunneled HD catheter present.     LABS/STUDIES  --------------------------------------------------------------------------------              8.6    11.5  >-----------<  141      [02-08-18 @ 03:39]              23.9     134  |  95  |  52  ----------------------------<  138      [02-08-18 @ 03:38]  3.9   |  24  |  5.92        Ca     8.4     [02-08-18 @ 03:38]      Mg     2.5     [02-08-18 @ 03:38]      Phos  3.3     [02-08-18 @ 03:38]    TPro  6.3  /  Alb  3.0  /  TBili  0.4  /  DBili  x   /  AST  20  /  ALT  16  /  AlkPhos  55  [02-08-18 @ 03:38]    PT/INR: PT 13.6 , INR 1.25       [02-08-18 @ 03:39]  PTT: 41.7       [02-08-18 @ 03:39]      Creatinine Trend:  SCr 5.92 [02-08 @ 03:38]  SCr 5.74 [02-07 @ 03:30]  SCr 3.62 [02-06 @ 05:33]  SCr 3.97 [02-05 @ 22:57]  SCr 5.51 [02-05 @ 05:05]    Urinalysis - [02-04-18 @ 13:17]      Color Yellow / Appearance Clear / SG 1.020 / pH 5.0      Gluc 50 / Ketone Negative  / Bili Negative / Urobili Negative       Blood Trace / Protein 30 / Leuk Est Trace / Nitrite Negative      RBC 0-2 / WBC 0-2 / Hyaline  / Gran  / Sq Epi  / Non Sq Epi Few / Bacteria Trace      HbA1c 5.4      [02-04-18 @ 21:37]  TSH 3.08      [02-04-18 @ 21:37]  Lipid: chol 145, , HDL 26, LDL 87      [02-04-18 @ 21:37]    HBsAb <3.0      [02-07-18 @ 23:02]  HBsAg Nonreact      [02-07-18 @ 23:02]  HCV 0.11, Nonreact      [02-07-18 @ 23:02]    C3 Complement 157      [02-05-18 @ 21:15]  C4 Complement 35      [02-05-18 @ 21:15]

## 2018-02-08 NOTE — PROGRESS NOTE ADULT - PROBLEM SELECTOR PLAN 1
Patient with DONNIE on CKD in setting of cardiorenal syndrome and ACE-I use and now hemodynamically significant WOOD: Baseline creatinine is between 1.2-1.3. Scr. on presentation was 6.48. Complements noted to be WNL. Patient with hemodynamically mediated DONNIE on CKI in setting of cardiorenal syndrome and ACE-I use? Patient was initiated on CVVHDF on 2/5/18 and transitioned to IHD. Scr. has worsened to 5.74 today.  Patient remains anuric. No emergent HD indication today.  Patient will need cardiac catheterization as per primary team. I agree with Dr. Etseban's assessment from yesterday that the patient could have chance of renal recovery however in this case, benefits outweigh the risks. Will need to discuss with interdisciplinary team.   Continue to monitor Scr, electrolytes, urine output. Avoid nephrotoxins.

## 2018-02-08 NOTE — CONSULT NOTE ADULT - SUBJECTIVE AND OBJECTIVE BOX
PAGER:  [phone]               [facility] [address]              EMAIL [email or web address]   OFFICE [phone]                              ********VASCULAR MEDICINE & CARDIOLOGY CONSULT NOTE********                          HPI:  This is a 77 year old man with past medical history of AAA 5.5cm s/p EVAAR repair with stents on 1/29 with incidental finding of Right Renal Neoplasm, previous history of Left Renal Neoplasm with Left Nephrectomy in 2008, Bladder Cancer, Known LBBB, HTN, HLD transferred from [facility] after initially presenting with SOB which began last night. Patient states that he was discharged from the hospital following AAA repair and recovering well.  He was walking with walker at home with no complaints.  After going to bed, he was awoken from his sleep complaining of SOB with no relief.  He went to [facility] wtih his SOB getting progressively worse with 1 episode of vomitus before going to hospital.  Patient denies fever, chills, recent sick contact, Chest pain, Abdominal pain, diarrhea.  At [place], paient with elevate d-dimers and transferred for possible PE.  At [facility], patient with  RICKI in V2-V4 with elevated Trops, and ADHF requiring Bipap. (04 Feb 2018 17:51)    Ultrasound of kidneys revealed unilateral renal artery stenosis. Vascular cardiology evaluation requested to assess WOOD, evaluate its etiology and possible treatment options. Presently, denies CP/SOB. Developed a bout of rapid atrial fibrillation last night but converted to SR.      MEDICATIONS:  aspirin enteric coated 81 milliGRAM(s) Oral daily  heparin  Infusion. 1000 Unit(s)/Hr IV Continuous <Continuous>  heparin  Injectable 5500 Unit(s) IV Push every 6 hours PRN  heparin  Injectable 2500 Unit(s) IV Push every 6 hours PRN  hydrALAZINE 100 milliGRAM(s) Oral every 8 hours  isosorbide   dinitrate Tablet (ISORDIL) 10 milliGRAM(s) Oral three times a day  metoprolol     tartrate 25 milliGRAM(s) Oral two times a day  aztreonam  IVPB 500 milliGRAM(s) IV Intermittent every 12 hours  diazepam    Tablet 10 milliGRAM(s) Oral two times a day PRN  docusate sodium 100 milliGRAM(s) Oral daily  senna 1 Tablet(s) Oral at bedtime  atorvastatin 80 milliGRAM(s) Oral at bedtime      REVIEW OF SYSTEMS:  CONSTITUTIONAL: No fever, weight loss, or fatigue  EYES: No eye pain, visual disturbances, or discharge  ENMT:  No difficulty hearing, tinnitus, vertigo; No sinus or throat pain  NECK: No pain or stiffness  RESPIRATORY: No cough, wheezing, chills or hemoptysis; No Shortness of Breath  CARDIOVASCULAR: No chest pain, palpitations, passing out, dizziness, or leg swelling  GASTROINTESTINAL: No abdominal or epigastric pain. No nausea, vomiting, or hematemesis; No diarrhea or constipation. No melena or hematochezia.  NEUROLOGICAL: No headaches, memory loss, loss of strength, numbness, or tremors  SKIN: No itching, burning, rashes, or lesions   LYMPH Nodes: No enlarged glands  ENDOCRINE: No heat or cold intolerance; No hair loss  MUSCULOSKELETAL: No joint pain or swelling; No muscle, back, or extremity pain  PSYCHIATRIC: No depression, anxiety, mood swings, or difficulty sleeping  HEME/LYMPH: No easy bruising, or bleeding gums  ALLERY AND IMMUNOLOGIC: No hives or eczema	    [X] All others negative	  [ ] Unable to obtain    PHYSICAL EXAM:  T(C): 36.8 (02-08-18 @ 08:00), Max: 37.2 (02-07-18 @ 19:30)  HR: 92 (02-08-18 @ 10:00) (86 - 140)  BP: --  RR: 12 (02-08-18 @ 10:00) (12 - 27)  SpO2: 95% (02-08-18 @ 10:00) (92% - 96%)  Wt(kg): --  I&O's Summary    07 Feb 2018 07:01  -  08 Feb 2018 07:00  --------------------------------------------------------  IN: 162 mL / OUT: 470 mL / NET: -308 mL    08 Feb 2018 07:01  -  08 Feb 2018 10:30  --------------------------------------------------------  IN: 33 mL / OUT: 0 mL / NET: 33 mL        Appearance: Normal  HEENT:   Normal oral mucosa, PERRL, EOMI	  Lymphatic: No lymphadenopathy  Cardiovascular: Normal S1 S2, No JVD, No murmurs, No edema  Respiratory: Decreased breath sounds in bases  Psychiatry: A & O x 3, Mood & affect appropriate  Gastrointestinal:  Soft, Non-tender, + BS	  Skin: No rashes, No ecchymoses, No cyanosis	  Neurologic: Non-focal  Extremities: Normal range of motion, No clubbing, cyanosis or edema  Vascular: Bilateral femoral cutdown sites c/d/i (no discharge); Peripheral pulses palpable 2+ bilaterally  Lines/Tubes: Dialysis catheter      LABS:	 	    CBC Full  -  ( 08 Feb 2018 03:39 )  WBC Count : 11.5 K/uL  Hemoglobin : 8.6 g/dL  Hematocrit : 23.9 %  Platelet Count - Automated : 141 K/uL  Mean Cell Volume : 94.0 fl  Mean Cell Hemoglobin : 33.9 pg  Mean Cell Hemoglobin Concentration : 36.0 gm/dL  Auto Neutrophil # : x  Auto Lymphocyte # : x  Auto Monocyte # : x  Auto Eosinophil # : x  Auto Basophil # : x  Auto Neutrophil % : x  Auto Lymphocyte % : x  Auto Monocyte % : x  Auto Eosinophil % : x  Auto Basophil % : x    02-08    134<L>  |  95<L>  |  52<H>  ----------------------------<  138<H>  3.9   |  24  |  5.92<H>  02-07    134<L>  |  96  |  59<H>  ----------------------------<  123<H>  3.8   |  22  |  5.74<H>    Ca    8.4      08 Feb 2018 03:38  Ca    8.7      07 Feb 2018 03:30  Phos  3.3     02-08  Phos  3.4     02-07  Mg     2.5     02-08  Mg     2.9     02-07    TPro  6.3  /  Alb  3.0<L>  /  TBili  0.4  /  DBili  x   /  AST  20  /  ALT  16  /  AlkPhos  55  02-08  TPro  6.5  /  Alb  3.2<L>  /  TBili  0.5  /  DBili  x   /  AST  13  /  ALT  11  /  AlkPhos  56  02-07 PAGER:  [phone]               [facility] [address]              EMAIL [email or web address]   OFFICE [phone]                              ********VASCULAR MEDICINE CONSULT NOTE********                          HPI:  This is a 77 year old man with past medical history of AAA 5.5cm s/p EVAAR repair with stents on 1/29 with incidental finding of Right Renal Neoplasm, previous history of Left Renal Neoplasm with Left Nephrectomy in 2008, Bladder Cancer, Known LBBB, HTN, HLD transferred from [facility] after initially presenting with SOB which began last night. Patient states that he was discharged from the hospital following AAA repair and recovering well.  He was walking with walker at home with no complaints.  After going to bed, he was awoken from his sleep complaining of SOB with no relief.  He went to [facility] wtih his SOB getting progressively worse with 1 episode of vomitus before going to hospital.  Patient denies fever, chills, recent sick contact, Chest pain, Abdominal pain, diarrhea.  At [place], paient with elevate d-dimers and transferred for possible PE.  At [facility], patient with  RIKCI in V2-V4 with elevated Trops, and ADHF requiring Bipap. (04 Feb 2018 17:51)    Ultrasound of kidneys revealed unilateral renal artery stenosis. Vascular cardiology evaluation requested to assess WOOD, evaluate its etiology and possible treatment options. Presently, denies CP/SOB. Developed a bout of rapid atrial fibrillation last night but converted to SR.      MEDICATIONS:  aspirin enteric coated 81 milliGRAM(s) Oral daily  heparin  Infusion. 1000 Unit(s)/Hr IV Continuous <Continuous>  heparin  Injectable 5500 Unit(s) IV Push every 6 hours PRN  heparin  Injectable 2500 Unit(s) IV Push every 6 hours PRN  hydrALAZINE 100 milliGRAM(s) Oral every 8 hours  isosorbide   dinitrate Tablet (ISORDIL) 10 milliGRAM(s) Oral three times a day  metoprolol     tartrate 25 milliGRAM(s) Oral two times a day  aztreonam  IVPB 500 milliGRAM(s) IV Intermittent every 12 hours  diazepam    Tablet 10 milliGRAM(s) Oral two times a day PRN  docusate sodium 100 milliGRAM(s) Oral daily  senna 1 Tablet(s) Oral at bedtime  atorvastatin 80 milliGRAM(s) Oral at bedtime      REVIEW OF SYSTEMS:  CONSTITUTIONAL: No fever, weight loss, or fatigue  EYES: No eye pain, visual disturbances, or discharge  ENMT:  No difficulty hearing, tinnitus, vertigo; No sinus or throat pain  NECK: No pain or stiffness  RESPIRATORY: No cough, wheezing, chills or hemoptysis; No Shortness of Breath  CARDIOVASCULAR: No chest pain, palpitations, passing out, dizziness, or leg swelling  GASTROINTESTINAL: No abdominal or epigastric pain. No nausea, vomiting, or hematemesis; No diarrhea or constipation. No melena or hematochezia.  NEUROLOGICAL: No headaches, memory loss, loss of strength, numbness, or tremors  SKIN: No itching, burning, rashes, or lesions   LYMPH Nodes: No enlarged glands  ENDOCRINE: No heat or cold intolerance; No hair loss  MUSCULOSKELETAL: No joint pain or swelling; No muscle, back, or extremity pain  PSYCHIATRIC: No depression, anxiety, mood swings, or difficulty sleeping  HEME/LYMPH: No easy bruising, or bleeding gums  ALLERY AND IMMUNOLOGIC: No hives or eczema	    [X] All others negative	  [ ] Unable to obtain    PHYSICAL EXAM:  T(C): 36.8 (02-08-18 @ 08:00), Max: 37.2 (02-07-18 @ 19:30)  HR: 92 (02-08-18 @ 10:00) (86 - 140)  BP: --  RR: 12 (02-08-18 @ 10:00) (12 - 27)  SpO2: 95% (02-08-18 @ 10:00) (92% - 96%)  Wt(kg): --  I&O's Summary    07 Feb 2018 07:01  -  08 Feb 2018 07:00  --------------------------------------------------------  IN: 162 mL / OUT: 470 mL / NET: -308 mL    08 Feb 2018 07:01  -  08 Feb 2018 10:30  --------------------------------------------------------  IN: 33 mL / OUT: 0 mL / NET: 33 mL        Appearance: Normal  HEENT:   Normal oral mucosa, PERRL, EOMI	  Lymphatic: No lymphadenopathy  Cardiovascular: Normal S1 S2, No JVD, No murmurs, No edema  Respiratory: Decreased breath sounds in bases  Psychiatry: A & O x 3, Mood & affect appropriate  Gastrointestinal:  Soft, Non-tender, + BS	  Skin: No rashes, No ecchymoses, No cyanosis	  Neurologic: Non-focal  Extremities: Normal range of motion, No clubbing, cyanosis or edema  Vascular: Bilateral femoral cutdown sites c/d/i (no discharge); Peripheral pulses palpable 2+ bilaterally  Lines/Tubes: Dialysis catheter      LABS:	 	    CBC Full  -  ( 08 Feb 2018 03:39 )  WBC Count : 11.5 K/uL  Hemoglobin : 8.6 g/dL  Hematocrit : 23.9 %  Platelet Count - Automated : 141 K/uL  Mean Cell Volume : 94.0 fl  Mean Cell Hemoglobin : 33.9 pg  Mean Cell Hemoglobin Concentration : 36.0 gm/dL  Auto Neutrophil # : x  Auto Lymphocyte # : x  Auto Monocyte # : x  Auto Eosinophil # : x  Auto Basophil # : x  Auto Neutrophil % : x  Auto Lymphocyte % : x  Auto Monocyte % : x  Auto Eosinophil % : x  Auto Basophil % : x    02-08    134<L>  |  95<L>  |  52<H>  ----------------------------<  138<H>  3.9   |  24  |  5.92<H>  02-07    134<L>  |  96  |  59<H>  ----------------------------<  123<H>  3.8   |  22  |  5.74<H>    Ca    8.4      08 Feb 2018 03:38  Ca    8.7      07 Feb 2018 03:30  Phos  3.3     02-08  Phos  3.4     02-07  Mg     2.5     02-08  Mg     2.9     02-07    TPro  6.3  /  Alb  3.0<L>  /  TBili  0.4  /  DBili  x   /  AST  20  /  ALT  16  /  AlkPhos  55  02-08  TPro  6.5  /  Alb  3.2<L>  /  TBili  0.5  /  DBili  x   /  AST  13  /  ALT  11  /  AlkPhos  56  02-07

## 2018-02-08 NOTE — PROGRESS NOTE ADULT - ASSESSMENT
77 year old man with past medical history of AAA 5.5cm s/p EVAAR repair on 1/29 with incidental finding of Right Neoplasm, previous history of Left Neoplasm with Left Nephrectomy in 2009, Bladder Cancer, Known LBBB, HTN, HLD presents with hypoxic respiratory failure 2/2 to pulmonary edema  2/2 ADHF + multifocal PNA in the setting of DONNIE on CKD     Neuro: AOx3, no issues    CV: Pulmonary edema 2/2 to ADHF + volume overload in the setting of acute renal failure. Now improved s/p aggressive diuresis.  TTE with evidence of severe LV dysfunction, EF 25%. Had recent stress testing that was negative. Prior TTE showed no wall motion abnormalities with an EF of ~60%.  Favor Surgery induced cardiomyopathy in the setting of renal artery stenosis vs  ischemic etiology.  Plan for LHC and cath of renal artery. Continue with aspirin  Continue with hydralazine, isordil, low dose bb  for preload/afterload reduction. Continue to monitor oxy sat  Afib with RVR: new onset, in the setting of sepsis, HF. Continue with BB, uptitrate as tolerated. CHADSVASC score of 4, started on a heparin gtt. Monitor bleeding       Pulm: Evidence of multifocal pna on xray. Given recent hospitalization, will treat for HCAP for total of 7 days. Blood cx NGTD  Wean off oxygen as tolerated    Renal: DONNIE on CKD in the setting of recent surgery, iv contrast for imaging. Renal US significant for high grade stenosis at the origin of the renal artery. Plan for the Cath of the renal artery with likely stenting. Continue with intermittent HD to start tomorrow. Monitor Scr, electrolytes, UOP.    Heme/Onc: Spoke with pt's outpatient urologist Dr. Santiago. He is aware of the lesion on pt's right kidney but says given its size, does not require intervention at this time and states we should prioritize treating potential ischemic disease. He will continue to monitor the mass as outpatient.     ID: Pt initially presented with severe sepsis likely 2/2 to PNA with signs of end organ dysfunction (donnie, elevated trops) now improving s/p abx. WBC downtrending    GI: DASH diet, no issues    DVT ppx: hep

## 2018-02-09 LAB
ALBUMIN SERPL ELPH-MCNC: 3.1 G/DL — LOW (ref 3.3–5)
ALBUMIN SERPL ELPH-MCNC: 3.2 G/DL — LOW (ref 3.3–5)
ALP SERPL-CCNC: 59 U/L — SIGNIFICANT CHANGE UP (ref 40–120)
ALP SERPL-CCNC: 60 U/L — SIGNIFICANT CHANGE UP (ref 40–120)
ALT FLD-CCNC: 19 U/L RC — SIGNIFICANT CHANGE UP (ref 10–45)
ALT FLD-CCNC: 25 U/L RC — SIGNIFICANT CHANGE UP (ref 10–45)
ANION GAP SERPL CALC-SCNC: 17 MMOL/L — SIGNIFICANT CHANGE UP (ref 5–17)
ANION GAP SERPL CALC-SCNC: 21 MMOL/L — HIGH (ref 5–17)
APTT BLD: 50.9 SEC — HIGH (ref 27.5–37.4)
APTT BLD: 52.6 SEC — HIGH (ref 27.5–37.4)
AST SERPL-CCNC: 32 U/L — SIGNIFICANT CHANGE UP (ref 10–40)
AST SERPL-CCNC: 33 U/L — SIGNIFICANT CHANGE UP (ref 10–40)
BILIRUB SERPL-MCNC: 0.5 MG/DL — SIGNIFICANT CHANGE UP (ref 0.2–1.2)
BILIRUB SERPL-MCNC: 0.5 MG/DL — SIGNIFICANT CHANGE UP (ref 0.2–1.2)
BUN SERPL-MCNC: 69 MG/DL — HIGH (ref 7–23)
BUN SERPL-MCNC: 77 MG/DL — HIGH (ref 7–23)
CALCIUM SERPL-MCNC: 8.4 MG/DL — SIGNIFICANT CHANGE UP (ref 8.4–10.5)
CALCIUM SERPL-MCNC: 8.5 MG/DL — SIGNIFICANT CHANGE UP (ref 8.4–10.5)
CHLORIDE SERPL-SCNC: 94 MMOL/L — LOW (ref 96–108)
CHLORIDE SERPL-SCNC: 96 MMOL/L — SIGNIFICANT CHANGE UP (ref 96–108)
CO2 SERPL-SCNC: 19 MMOL/L — LOW (ref 22–31)
CO2 SERPL-SCNC: 22 MMOL/L — SIGNIFICANT CHANGE UP (ref 22–31)
CREAT SERPL-MCNC: 6.61 MG/DL — HIGH (ref 0.5–1.3)
CREAT SERPL-MCNC: 8.08 MG/DL — HIGH (ref 0.5–1.3)
CULTURE RESULTS: SIGNIFICANT CHANGE UP
CULTURE RESULTS: SIGNIFICANT CHANGE UP
GLUCOSE SERPL-MCNC: 108 MG/DL — HIGH (ref 70–99)
GLUCOSE SERPL-MCNC: 135 MG/DL — HIGH (ref 70–99)
HCT VFR BLD CALC: 22.9 % — LOW (ref 39–50)
HCT VFR BLD CALC: 23.2 % — LOW (ref 39–50)
HEPARIN INHIBITION TEG PNL BLD: 99 % — HIGH (ref 0–0)
HGB BLD-MCNC: 8.1 G/DL — LOW (ref 13–17)
HGB BLD-MCNC: 8.2 G/DL — LOW (ref 13–17)
INR BLD: 1.27 RATIO — HIGH (ref 0.88–1.16)
INR BLD: 2.09 RATIO — HIGH (ref 0.88–1.16)
MAGNESIUM SERPL-MCNC: 2.6 MG/DL — SIGNIFICANT CHANGE UP (ref 1.6–2.6)
MAGNESIUM SERPL-MCNC: 2.6 MG/DL — SIGNIFICANT CHANGE UP (ref 1.6–2.6)
MCHC RBC-ENTMCNC: 33.3 PG — SIGNIFICANT CHANGE UP (ref 27–34)
MCHC RBC-ENTMCNC: 33.4 PG — SIGNIFICANT CHANGE UP (ref 27–34)
MCHC RBC-ENTMCNC: 35.3 GM/DL — SIGNIFICANT CHANGE UP (ref 32–36)
MCHC RBC-ENTMCNC: 35.4 GM/DL — SIGNIFICANT CHANGE UP (ref 32–36)
MCV RBC AUTO: 94.1 FL — SIGNIFICANT CHANGE UP (ref 80–100)
MCV RBC AUTO: 94.7 FL — SIGNIFICANT CHANGE UP (ref 80–100)
PF4 HEPARIN CMPLX AB SER-ACNC: POSITIVE
PF4 HEPARIN CMPLX AB SER-ACNC: SIGNIFICANT CHANGE UP
PF4 HEPARIN CMPLX AB SERPL QL IA: 1.6 ABS — HIGH
PHOSPHATE SERPL-MCNC: 4.6 MG/DL — HIGH (ref 2.5–4.5)
PHOSPHATE SERPL-MCNC: 5.9 MG/DL — HIGH (ref 2.5–4.5)
PLATELET # BLD AUTO: 72 K/UL — LOW (ref 150–400)
PLATELET # BLD AUTO: 86 K/UL — LOW (ref 150–400)
POTASSIUM SERPL-MCNC: 4.7 MMOL/L — SIGNIFICANT CHANGE UP (ref 3.5–5.3)
POTASSIUM SERPL-MCNC: 4.8 MMOL/L — SIGNIFICANT CHANGE UP (ref 3.5–5.3)
POTASSIUM SERPL-SCNC: 4.7 MMOL/L — SIGNIFICANT CHANGE UP (ref 3.5–5.3)
POTASSIUM SERPL-SCNC: 4.8 MMOL/L — SIGNIFICANT CHANGE UP (ref 3.5–5.3)
PROT SERPL-MCNC: 6.3 G/DL — SIGNIFICANT CHANGE UP (ref 6–8.3)
PROT SERPL-MCNC: 6.8 G/DL — SIGNIFICANT CHANGE UP (ref 6–8.3)
PROTHROM AB SERPL-ACNC: 13.9 SEC — HIGH (ref 9.8–12.7)
PROTHROM AB SERPL-ACNC: 22.9 SEC — HIGH (ref 9.8–12.7)
RBC # BLD: 2.43 M/UL — LOW (ref 4.2–5.8)
RBC # BLD: 2.45 M/UL — LOW (ref 4.2–5.8)
RBC # FLD: 11.5 % — SIGNIFICANT CHANGE UP (ref 10.3–14.5)
RBC # FLD: 11.9 % — SIGNIFICANT CHANGE UP (ref 10.3–14.5)
SODIUM SERPL-SCNC: 134 MMOL/L — LOW (ref 135–145)
SODIUM SERPL-SCNC: 135 MMOL/L — SIGNIFICANT CHANGE UP (ref 135–145)
SPECIMEN SOURCE: SIGNIFICANT CHANGE UP
SPECIMEN SOURCE: SIGNIFICANT CHANGE UP
VANCOMYCIN TROUGH SERPL-MCNC: 23.9 UG/ML — HIGH (ref 10–20)
WBC # BLD: 13.8 K/UL — HIGH (ref 3.8–10.5)
WBC # BLD: 17.1 K/UL — HIGH (ref 3.8–10.5)
WBC # FLD AUTO: 13.8 K/UL — HIGH (ref 3.8–10.5)
WBC # FLD AUTO: 17.1 K/UL — HIGH (ref 3.8–10.5)

## 2018-02-09 PROCEDURE — 93010 ELECTROCARDIOGRAM REPORT: CPT

## 2018-02-09 PROCEDURE — 99233 SBSQ HOSP IP/OBS HIGH 50: CPT | Mod: GC

## 2018-02-09 PROCEDURE — 93321 DOPPLER ECHO F-UP/LMTD STD: CPT | Mod: 26

## 2018-02-09 PROCEDURE — 93308 TTE F-UP OR LMTD: CPT | Mod: 26

## 2018-02-09 RX ORDER — METOPROLOL TARTRATE 50 MG
50 TABLET ORAL DAILY
Qty: 0 | Refills: 0 | Status: DISCONTINUED | OUTPATIENT
Start: 2018-02-09 | End: 2018-02-19

## 2018-02-09 RX ORDER — ARGATROBAN 50 MG/50ML
3 INJECTION, SOLUTION INTRAVENOUS
Qty: 250 | Refills: 0 | Status: DISCONTINUED | OUTPATIENT
Start: 2018-02-09 | End: 2018-02-13

## 2018-02-09 RX ADMIN — ARGATROBAN 12.24 MICROGRAM(S)/KG/MIN: 50 INJECTION, SOLUTION INTRAVENOUS at 18:18

## 2018-02-09 RX ADMIN — Medication 100 MILLIGRAM(S): at 21:50

## 2018-02-09 RX ADMIN — Medication 50 MILLIGRAM(S): at 16:39

## 2018-02-09 RX ADMIN — ISOSORBIDE DINITRATE 10 MILLIGRAM(S): 5 TABLET ORAL at 05:40

## 2018-02-09 RX ADMIN — HEPARIN SODIUM 1300 UNIT(S)/HR: 5000 INJECTION INTRAVENOUS; SUBCUTANEOUS at 05:49

## 2018-02-09 RX ADMIN — Medication 50 MILLIGRAM(S): at 06:13

## 2018-02-09 RX ADMIN — ATORVASTATIN CALCIUM 80 MILLIGRAM(S): 80 TABLET, FILM COATED ORAL at 21:50

## 2018-02-09 RX ADMIN — Medication 100 MILLIGRAM(S): at 05:40

## 2018-02-09 RX ADMIN — Medication 81 MILLIGRAM(S): at 14:25

## 2018-02-09 RX ADMIN — Medication 10 MILLIGRAM(S): at 00:46

## 2018-02-09 RX ADMIN — ISOSORBIDE DINITRATE 10 MILLIGRAM(S): 5 TABLET ORAL at 21:50

## 2018-02-09 RX ADMIN — ISOSORBIDE DINITRATE 10 MILLIGRAM(S): 5 TABLET ORAL at 17:13

## 2018-02-09 RX ADMIN — Medication 50 MILLIGRAM(S): at 01:05

## 2018-02-09 RX ADMIN — HEPARIN SODIUM 1200 UNIT(S)/HR: 5000 INJECTION INTRAVENOUS; SUBCUTANEOUS at 00:15

## 2018-02-09 RX ADMIN — ARGATROBAN 8.16 MICROGRAM(S)/KG/MIN: 50 INJECTION, SOLUTION INTRAVENOUS at 08:30

## 2018-02-09 RX ADMIN — Medication 100 MILLIGRAM(S): at 14:25

## 2018-02-09 NOTE — CHART NOTE - NSCHARTNOTEFT_GEN_A_CORE
====================  CCU MIDNIGHT ROUNDS  ====================    DEUCE BALL  [id number]  Patient is a 77y old  Male who presents with a chief complaint of Shortness of Breath (04 Feb 2018 17:51)      ====================  SUMMARY:  ====================      ====================  NEW EVENTS:  ====================    MEDICATIONS  (STANDING):  argatroban Infusion 3 MICROgram(s)/kG/Min (12.24 mL/Hr) IV Continuous <Continuous>  aspirin enteric coated 81 milliGRAM(s) Oral daily  atorvastatin 80 milliGRAM(s) Oral at bedtime  aztreonam  IVPB 500 milliGRAM(s) IV Intermittent every 12 hours  docusate sodium 100 milliGRAM(s) Oral daily  hydrALAZINE 100 milliGRAM(s) Oral every 8 hours  isosorbide   dinitrate Tablet (ISORDIL) 10 milliGRAM(s) Oral three times a day  metoprolol succinate ER 50 milliGRAM(s) Oral daily  senna 1 Tablet(s) Oral at bedtime    MEDICATIONS  (PRN):  diazepam    Tablet 10 milliGRAM(s) Oral two times a day PRN anxiety  polyethylene glycol 3350 17 Gram(s) Oral daily PRN Constipation      ====================  VITALS (Last 12 hrs):  ====================    T(C): 36.6 (02-09-18 @ 20:00), Max: 36.6 (02-09-18 @ 20:00)  T(F): 97.8 (02-09-18 @ 20:00), Max: 97.8 (02-09-18 @ 20:00)  HR: 102 (02-09-18 @ 21:00) (86 - 102)  BP: --  BP(mean): --  ABP: 158/78 (02-09-18 @ 21:00) (100/42 - 166/80)  ABP(mean): 108 (02-09-18 @ 21:00) (64 - 110)  RR: 24 (02-09-18 @ 21:00) (16 - 29)  SpO2: 88% (02-09-18 @ 21:00) (88% - 96%)  Wt(kg): --  CVP(mm Hg): --  CVP(cm H2O): --  CO: --  CI: --  PA: --  PA(mean): --  PCWP: --  SVR: --  PVR: --    I&O's Summary    08 Feb 2018 07:01  -  09 Feb 2018 07:00  --------------------------------------------------------  IN: 407 mL / OUT: 0 mL / NET: 407 mL    09 Feb 2018 07:01  -  09 Feb 2018 21:40  --------------------------------------------------------  IN: 224.4 mL / OUT: 0 mL / NET: 224.4 mL    ====================  NEW LABS:  ====================    02-09    134<L>  |  94<L>  |  77<H>  ----------------------------<  108<H>  4.8   |  19<L>  |  8.08<H>    Ca    8.4      09 Feb 2018 16:42  Phos  5.9     02-09  Mg     2.6     02-09    TPro  6.8  /  Alb  3.1<L>  /  TBili  0.5  /  DBili  x   /  AST  33  /  ALT  25  /  AlkPhos  60  02-09    PT/INR - ( 09 Feb 2018 16:42 )   PT: 22.9 sec;   INR: 2.09 ratio      PTT - ( 09 Feb 2018 16:42 )  PTT:52.6 sec    ====================  PLAN:  ====================  -       Lety Dyer U NP  Beeper #[id number]  Spectra # [phone] ====================  CCU MIDNIGHT ROUNDS  ====================    DEUCE BALL  [id number]  Patient is a 77y old  Male who presents with a chief complaint of Shortness of Breath (04 Feb 2018 17:51)    ====================  SUMMARY:  ====================  78 yo M AAA (5.5 cm s/p EVAAR 1/29), R kidney neoplasm (incidental finding), prior L kidney neoplasm (L nephrectomy 2008), bladder Ca, known LBBB, HTN, HLD, transferred from [place] w/ SOB, found to have ADHF, multifocal PNA, AKA on CKD req CVV (stopped 1200 2/6)     ====================  NEW EVENTS:  ====================  CTA not done, due to no access. Pt remains stable, no orthopnea, on RA, SR/ST low 100s. Pt thrombocytopenic r/o HIT    MEDICATIONS  (STANDING):  argatroban Infusion 3 MICROgram(s)/kG/Min (12.24 mL/Hr) IV Continuous <Continuous>  aspirin enteric coated 81 milliGRAM(s) Oral daily  atorvastatin 80 milliGRAM(s) Oral at bedtime  aztreonam  IVPB 500 milliGRAM(s) IV Intermittent every 12 hours  docusate sodium 100 milliGRAM(s) Oral daily  hydrALAZINE 100 milliGRAM(s) Oral every 8 hours  isosorbide   dinitrate Tablet (ISORDIL) 10 milliGRAM(s) Oral three times a day  metoprolol succinate ER 50 milliGRAM(s) Oral daily  senna 1 Tablet(s) Oral at bedtime    MEDICATIONS  (PRN):  diazepam    Tablet 10 milliGRAM(s) Oral two times a day PRN anxiety  polyethylene glycol 3350 17 Gram(s) Oral daily PRN Constipation      ====================  VITALS (Last 12 hrs):  ====================    T(C): 36.6 (02-09-18 @ 20:00), Max: 36.6 (02-09-18 @ 20:00)  T(F): 97.8 (02-09-18 @ 20:00), Max: 97.8 (02-09-18 @ 20:00)  HR: 102 (02-09-18 @ 21:00) (86 - 102)  BP: --  BP(mean): --  ABP: 158/78 (02-09-18 @ 21:00) (100/42 - 166/80)  ABP(mean): 108 (02-09-18 @ 21:00) (64 - 110)  RR: 24 (02-09-18 @ 21:00) (16 - 29)  SpO2: 88% (02-09-18 @ 21:00) (88% - 96%)  Wt(kg): --  CVP(mm Hg): --  CVP(cm H2O): --  CO: --  CI: --  PA: --  PA(mean): --  PCWP: --  SVR: --  PVR: --    I&O's Summary    08 Feb 2018 07:01  -  09 Feb 2018 07:00  --------------------------------------------------------  IN: 407 mL / OUT: 0 mL / NET: 407 mL    09 Feb 2018 07:01  -  09 Feb 2018 21:40  --------------------------------------------------------  IN: 224.4 mL / OUT: 0 mL / NET: 224.4 mL    ====================  NEW LABS:  ====================    02-09    134<L>  |  94<L>  |  77<H>  ----------------------------<  108<H>  4.8   |  19<L>  |  8.08<H>    Ca    8.4      09 Feb 2018 16:42  Phos  5.9     02-09  Mg     2.6     02-09    TPro  6.8  /  Alb  3.1<L>  /  TBili  0.5  /  DBili  x   /  AST  33  /  ALT  25  /  AlkPhos  60  02-09    PT/INR - ( 09 Feb 2018 16:42 )   PT: 22.9 sec;   INR: 2.09 ratio      PTT - ( 09 Feb 2018 16:42 )  PTT:52.6 sec    ====================  PLAN:  ====================  - 78 y/o male with Renal artery stenosis, with DONNIE with CKD requiring HD. s/p LHC TVD no intervention. CT Abd/pelvis on hold d/t poor access.  Thrombocytopenia    -c/w Argatroban gtt for AF, f/u HIT panel and PRADEEP. Monitor coags and signs of bleeding.   - HD per renal, continue to monitor kidney function  - follow up CTA pending access.   - c/w ASA, high intensity statin, hydralazine, isordil, toprol  - continue aztreonam, vanco by level  - f/w vascular with regards to B/L femoral staples.   - OOB to chair       Lety Dyer CCU NP  Beeper #[id number]  Spectra # [phone]

## 2018-02-09 NOTE — PROGRESS NOTE ADULT - SUBJECTIVE AND OBJECTIVE BOX
[facility] Division of Kidney Diseases & Hypertension  FOLLOW UP NOTE  [phone]--------------------------------------------------------------------------------    HPI: 77 year old man with past medical history of AAA 5.5cm s/p EVAAR repair on 1/29 with incidental finding of Right Neoplasm, previous history of Left Neoplasm with Left Nephrectomy in 2009 transferred from [facility] for worsening SOB. Patient was found to have DONNIE and fluid overload and was initiated on CVVHDF on 2/4/17 and was stopped on 2/6/18.  Had rapid afib with HD yesterday.  Ultrasound suggestive of high grade right renal artery narrowing.  On evaluation today the patient is still not urinating. He denies complains of SOB, CP, abdominal pain, nausea, vomiting.       PAST HISTORY  --------------------------------------------------------------------------------  No significant changes to PMH, PSH, FHx, SHx, unless otherwise noted    ALLERGIES & MEDICATIONS  --------------------------------------------------------------------------------  Allergies    Cipro (Other)  Levaquin (Other)  penicillin (Hives)    Intolerances      Standing Inpatient Medications  argatroban Infusion 2 MICROgram(s)/kG/Min IV Continuous <Continuous>  aspirin enteric coated 81 milliGRAM(s) Oral daily  atorvastatin 80 milliGRAM(s) Oral at bedtime  aztreonam  IVPB 500 milliGRAM(s) IV Intermittent every 12 hours  docusate sodium 100 milliGRAM(s) Oral daily  hydrALAZINE 100 milliGRAM(s) Oral every 8 hours  isosorbide   dinitrate Tablet (ISORDIL) 10 milliGRAM(s) Oral three times a day  metoprolol succinate ER 50 milliGRAM(s) Oral daily  senna 1 Tablet(s) Oral at bedtime    PRN Inpatient Medications  diazepam    Tablet 10 milliGRAM(s) Oral two times a day PRN  polyethylene glycol 3350 17 Gram(s) Oral daily PRN      REVIEW OF SYSTEMS  --------------------------------------------------------------------------------    Gen: No  fevers/chills  Head/Eyes/Ears/Mouth: No headache  Respiratory: no dyspnea  CV: No chest pain  GI: No abdominal pain  : still anuric    VITALS/PHYSICAL EXAM  --------------------------------------------------------------------------------  T(C): 37.2 (02-09-18 @ 06:00), Max: 37.2 (02-09-18 @ 06:00)  HR: 98 (02-09-18 @ 16:00) (76 - 110)  BP: --  RR: 18 (02-09-18 @ 16:00) (12 - 28)  SpO2: 91% (02-09-18 @ 16:00) (91% - 98%)  Wt(kg): --        02-08-18 @ 07:01  -  02-09-18 @ 07:00  --------------------------------------------------------  IN: 407 mL / OUT: 0 mL / NET: 407 mL    02-09-18 @ 07:01  -  02-09-18 @ 17:05  --------------------------------------------------------  IN: 137.4 mL / OUT: 0 mL / NET: 137.4 mL      Physical Exam:  	Gen: NAD   	HEENT: anicteric  	Pulm: lungs clear on auscultation.   	CV: RRR  	Abd: soft, nontender, nondistended  	LE: no B/L pedal edema present.   	Skin: Warm and dry   	Vascular access: right non tunneled HD catheter present.       LABS/STUDIES  --------------------------------------------------------------------------------              8.2    17.1  >-----------<  86       [02-09-18 @ 16:42]              23.2     135  |  96  |  69  ----------------------------<  135      [02-09-18 @ 03:18]  4.7   |  22  |  6.61        Ca     8.5     [02-09-18 @ 03:18]      Mg     2.6     [02-09-18 @ 03:18]      Phos  4.6     [02-09-18 @ 03:18]    TPro  6.3  /  Alb  3.2  /  TBili  0.5  /  DBili  x   /  AST  32  /  ALT  19  /  AlkPhos  59  [02-09-18 @ 03:18]    PT/INR: PT 13.9 , INR 1.27       [02-09-18 @ 05:24]  PTT: 50.9       [02-09-18 @ 05:24]      Creatinine Trend:  SCr 6.61 [02-09 @ 03:18]  SCr 5.92 [02-08 @ 03:38]  SCr 5.74 [02-07 @ 03:30]  SCr 3.62 [02-06 @ 05:33]  SCr 3.97 [02-05 @ 22:57]    Urinalysis - [02-04-18 @ 13:17]      Color Yellow / Appearance Clear / SG 1.020 / pH 5.0      Gluc 50 / Ketone Negative  / Bili Negative / Urobili Negative       Blood Trace / Protein 30 / Leuk Est Trace / Nitrite Negative      RBC 0-2 / WBC 0-2 / Hyaline  / Gran  / Sq Epi  / Non Sq Epi Few / Bacteria Trace      HbA1c 5.4      [02-04-18 @ 21:37]  TSH 3.08      [02-04-18 @ 21:37]  Lipid: chol 145, , HDL 26, LDL 87      [02-04-18 @ 21:37]    HBsAb <3.0      [02-07-18 @ 23:02]  HBsAg Nonreact      [02-07-18 @ 23:02]  HCV 0.11, Nonreact      [02-07-18 @ 23:02]    C3 Complement 157      [02-05-18 @ 21:15]  C4 Complement 35      [02-05-18 @ 21:15] [facility] Division of Kidney Diseases & Hypertension  FOLLOW UP NOTE  [phone]--------------------------------------------------------------------------------    HPI: 77 year old man with past medical history of AAA 5.5cm s/p EVAAR repair on 1/29 with incidental finding of Right Neoplasm, previous history of Left Neoplasm with Left Nephrectomy in 2009 transferred from [facility] for worsening SOB. Patient was found to have DONNIE and fluid overload and was initiated on CVVHDF on 2/4/17 and was stopped on 2/6/18.  Had rapid afib with HD.  Ultrasound suggestive of high grade right renal artery narrowing.  On evaluation today the patient is still not urinating. He denies complains of SOB, CP, abdominal pain, nausea, vomiting.       PAST HISTORY  --------------------------------------------------------------------------------  No significant changes to PMH, PSH, FHx, SHx, unless otherwise noted    ALLERGIES & MEDICATIONS  --------------------------------------------------------------------------------  Allergies    Cipro (Other)  Levaquin (Other)  penicillin (Hives)    Intolerances      Standing Inpatient Medications  argatroban Infusion 2 MICROgram(s)/kG/Min IV Continuous <Continuous>  aspirin enteric coated 81 milliGRAM(s) Oral daily  atorvastatin 80 milliGRAM(s) Oral at bedtime  aztreonam  IVPB 500 milliGRAM(s) IV Intermittent every 12 hours  docusate sodium 100 milliGRAM(s) Oral daily  hydrALAZINE 100 milliGRAM(s) Oral every 8 hours  isosorbide   dinitrate Tablet (ISORDIL) 10 milliGRAM(s) Oral three times a day  metoprolol succinate ER 50 milliGRAM(s) Oral daily  senna 1 Tablet(s) Oral at bedtime    PRN Inpatient Medications  diazepam    Tablet 10 milliGRAM(s) Oral two times a day PRN  polyethylene glycol 3350 17 Gram(s) Oral daily PRN      REVIEW OF SYSTEMS  --------------------------------------------------------------------------------    Gen: No  fevers/chills  Head/Eyes/Ears/Mouth: No headache  Respiratory: no dyspnea  CV: No chest pain  GI: No abdominal pain  : still anuric    VITALS/PHYSICAL EXAM  --------------------------------------------------------------------------------  T(C): 37.2 (02-09-18 @ 06:00), Max: 37.2 (02-09-18 @ 06:00)  HR: 98 (02-09-18 @ 16:00) (76 - 110)  BP:   RR: 18 (02-09-18 @ 16:00) (12 - 28)  SpO2: 91% (02-09-18 @ 16:00) (91% - 98%)  Wt(kg): --        02-08-18 @ 07:01  -  02-09-18 @ 07:00  --------------------------------------------------------  IN: 407 mL / OUT: 0 mL / NET: 407 mL    02-09-18 @ 07:01  -  02-09-18 @ 17:05  --------------------------------------------------------  IN: 137.4 mL / OUT: 0 mL / NET: 137.4 mL      Physical Exam:  	Gen: NAD   	HEENT: anicteric  	Pulm: lungs clear on auscultation.   	CV: RRR  	Abd: soft, nontender, nondistended  	LE: no B/L pedal edema present.   	Skin: Warm and dry   	Vascular access: right non tunneled HD catheter present.       LABS/STUDIES  --------------------------------------------------------------------------------              8.2    17.1  >-----------<  86       [02-09-18 @ 16:42]              23.2     135  |  96  |  69  ----------------------------<  135      [02-09-18 @ 03:18]  4.7   |  22  |  6.61        Ca     8.5     [02-09-18 @ 03:18]      Mg     2.6     [02-09-18 @ 03:18]      Phos  4.6     [02-09-18 @ 03:18]    TPro  6.3  /  Alb  3.2  /  TBili  0.5  /  DBili  x   /  AST  32  /  ALT  19  /  AlkPhos  59  [02-09-18 @ 03:18]    PT/INR: PT 13.9 , INR 1.27       [02-09-18 @ 05:24]  PTT: 50.9       [02-09-18 @ 05:24]      Creatinine Trend:  SCr 6.61 [02-09 @ 03:18]  SCr 5.92 [02-08 @ 03:38]  SCr 5.74 [02-07 @ 03:30]  SCr 3.62 [02-06 @ 05:33]  SCr 3.97 [02-05 @ 22:57]    Urinalysis - [02-04-18 @ 13:17]      Color Yellow / Appearance Clear / SG 1.020 / pH 5.0      Gluc 50 / Ketone Negative  / Bili Negative / Urobili Negative       Blood Trace / Protein 30 / Leuk Est Trace / Nitrite Negative      RBC 0-2 / WBC 0-2 / Hyaline  / Gran  / Sq Epi  / Non Sq Epi Few / Bacteria Trace      HbA1c 5.4      [02-04-18 @ 21:37]  TSH 3.08      [02-04-18 @ 21:37]  Lipid: chol 145, , HDL 26, LDL 87      [02-04-18 @ 21:37]    HBsAb <3.0      [02-07-18 @ 23:02]  HBsAg Nonreact      [02-07-18 @ 23:02]  HCV 0.11, Nonreact      [02-07-18 @ 23:02]    C3 Complement 157      [02-05-18 @ 21:15]  C4 Complement 35      [02-05-18 @ 21:15] [facility] Division of Kidney Diseases & Hypertension  FOLLOW UP NOTE  [phone]--------------------------------------------------------------------------------    HPI: 77 year old man with past medical history of AAA 5.5cm s/p EVAAR repair on 1/29 with incidental finding of Right Neoplasm, previous history of Left Neoplasm with Left Nephrectomy in 2009 transferred from [facility] for worsening SOB. Patient was found to have DONNIE and fluid overload and was initiated on CVVHDF on 2/4/17 and was stopped on 2/6/18.  Had rapid afib with HD.  Ultrasound suggestive of high grade right renal artery narrowing.  On evaluation today the patient is still not urinating. He denies complains of SOB, CP, abdominal pain, nausea, vomiting.       PAST HISTORY  --------------------------------------------------------------------------------  No significant changes to PMH, PSH, FHx, SHx, unless otherwise noted    ALLERGIES & MEDICATIONS  --------------------------------------------------------------------------------  Allergies    Cipro (Other)  Levaquin (Other)  penicillin (Hives)    Intolerances      Standing Inpatient Medications  argatroban Infusion 2 MICROgram(s)/kG/Min IV Continuous <Continuous>  aspirin enteric coated 81 milliGRAM(s) Oral daily  atorvastatin 80 milliGRAM(s) Oral at bedtime  aztreonam  IVPB 500 milliGRAM(s) IV Intermittent every 12 hours  docusate sodium 100 milliGRAM(s) Oral daily  hydrALAZINE 100 milliGRAM(s) Oral every 8 hours  isosorbide   dinitrate Tablet (ISORDIL) 10 milliGRAM(s) Oral three times a day  metoprolol succinate ER 50 milliGRAM(s) Oral daily  senna 1 Tablet(s) Oral at bedtime    PRN Inpatient Medications  diazepam    Tablet 10 milliGRAM(s) Oral two times a day PRN  polyethylene glycol 3350 17 Gram(s) Oral daily PRN      REVIEW OF SYSTEMS  --------------------------------------------------------------------------------    Gen: No  fevers/chills  Head/Eyes/Ears/Mouth: No headache  Respiratory: no dyspnea  CV: No chest pain  GI: No abdominal pain  : still anuric    VITALS/PHYSICAL EXAM  --------------------------------------------------------------------------------  T(C): 37.2 (02-09-18 @ 06:00), Max: 37.2 (02-09-18 @ 06:00)  HR: 98 (02-09-18 @ 16:00) (76 - 110)  BP: 160/72  RR: 18 (02-09-18 @ 16:00) (12 - 28)  SpO2: 91% (02-09-18 @ 16:00) (91% - 98%)  Wt(kg): --        02-08-18 @ 07:01  -  02-09-18 @ 07:00  --------------------------------------------------------  IN: 407 mL / OUT: 0 mL / NET: 407 mL    02-09-18 @ 07:01  -  02-09-18 @ 17:05  --------------------------------------------------------  IN: 137.4 mL / OUT: 0 mL / NET: 137.4 mL      Physical Exam:  	Gen: NAD   	HEENT: anicteric  	Pulm: lungs clear on auscultation.   	CV: RRR  	Abd: soft, nontender, nondistended  	LE: no B/L pedal edema present.   	Skin: Warm and dry   	Vascular access: right non tunneled HD catheter present.       LABS/STUDIES  --------------------------------------------------------------------------------              8.2    17.1  >-----------<  86       [02-09-18 @ 16:42]              23.2     135  |  96  |  69  ----------------------------<  135      [02-09-18 @ 03:18]  4.7   |  22  |  6.61        Ca     8.5     [02-09-18 @ 03:18]      Mg     2.6     [02-09-18 @ 03:18]      Phos  4.6     [02-09-18 @ 03:18]    TPro  6.3  /  Alb  3.2  /  TBili  0.5  /  DBili  x   /  AST  32  /  ALT  19  /  AlkPhos  59  [02-09-18 @ 03:18]    PT/INR: PT 13.9 , INR 1.27       [02-09-18 @ 05:24]  PTT: 50.9       [02-09-18 @ 05:24]      Creatinine Trend:  SCr 6.61 [02-09 @ 03:18]  SCr 5.92 [02-08 @ 03:38]  SCr 5.74 [02-07 @ 03:30]  SCr 3.62 [02-06 @ 05:33]  SCr 3.97 [02-05 @ 22:57]    Urinalysis - [02-04-18 @ 13:17]      Color Yellow / Appearance Clear / SG 1.020 / pH 5.0      Gluc 50 / Ketone Negative  / Bili Negative / Urobili Negative       Blood Trace / Protein 30 / Leuk Est Trace / Nitrite Negative      RBC 0-2 / WBC 0-2 / Hyaline  / Gran  / Sq Epi  / Non Sq Epi Few / Bacteria Trace      HbA1c 5.4      [02-04-18 @ 21:37]  TSH 3.08      [02-04-18 @ 21:37]  Lipid: chol 145, , HDL 26, LDL 87      [02-04-18 @ 21:37]    HBsAb <3.0      [02-07-18 @ 23:02]  HBsAg Nonreact      [02-07-18 @ 23:02]  HCV 0.11, Nonreact      [02-07-18 @ 23:02]    C3 Complement 157      [02-05-18 @ 21:15]  C4 Complement 35      [02-05-18 @ 21:15]

## 2018-02-09 NOTE — PROGRESS NOTE ADULT - ASSESSMENT
77 year old man with past medical history of AAA 5.5cm s/p EVAAR repair on 1/29 with incidental finding of Right Neoplasm, previous history of Left Neoplasm with Left Nephrectomy in 2009, Bladder Cancer, Known LBBB, HTN, HLD presents with hypoxic respiratory failure 2/2 to pulmonary edema  2/2 ADHF + multifocal PNA in the setting of DONNIE on CKD with evidence of severe renal artery stenosis and TVD on cath    Neuro: AOx2-3  Confused at times, likely delirium in the setting of hospital stay. Able to easily re-orient    CV: Pulmonary edema 2/2 to ADHF + volume overload in the setting of acute renal failure. Now resolved s/p dialysis   LHC with triple vessel disease, PCI planned after renal artery intervention. Continue with aspirin, bb, statin  TTE with evidence of severe LV dysfunction, Given LHC findings, likely ischemic cardiomyopathy.   Continue with hydralazine, isordil, low dose bb  for preload/afterload reduction. Continue to monitor oxy sat  Afib with RVR: new onset, in the setting of sepsis, HF. No repeat episodes Continue with BB, uptitrate as tolerated. CHADSVASC score of 4, started on a heparin gtt, now on argatroban gtt given concern for HIT. Monitor bleeding       Pulm: Evidence of multifocal pna on xray. Given recent hospitalization, will treat for HCAP for total of 7 days. Blood cx NGTD  Wean off oxygen as tolerated    Renal: DONNIE on CKD in the setting of recent surgery, iv contrast for imaging. Renal US significant for high grade stenosis at the origin of the renal artery. Plan for the Cath of the renal artery with likely stenting. Renal artery could not be engaged, unclear relation of stent structure to artery.   CTA aorta with 1mm cuts to eval renal artery takoff.  Continue with intermittent HD following contrast Monitor Scr, electrolytes, UOP.    Heme/Onc: Spoke with pt's outpatient urologist Dr. Santiago. He is aware of the lesion on pt's right kidney but says given its size, does not require intervention at this time and states we should prioritize treating potential ischemic disease. He will continue to monitor the mass as outpatient.   R/o HIT: HIT score ~4-5. 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 Continue with argatroban gtt    ID: Pt initially presented with severe sepsis likely 2/2 to PNA with signs of end organ dysfunction (donnie, elevated trops) now improving s/p abx. WBC downtrending. Vanc level high, held dose. Continue to trend    GI: DASH diet, no issues    DVT ppx: hep

## 2018-02-09 NOTE — PROGRESS NOTE ADULT - SUBJECTIVE AND OBJECTIVE BOX
Patient is a 77y old  Male who presents with a chief complaint of Shortness of Breath (04 Feb 2018 17:51)    Event: Pt was noted to have a 50% drop in platelet count. Given history of heparin use, heparin was discontinued and pt was started on argatroban for suspected HIT. Patient denies bleeding. Breathing comfortably without chest pain, palpitations, dizziness or n/v. He tolerated cath well, no hematoma from groin site.     HPI:  This is a 77 year old man with past medical history of AAA 5.5cm s/p EVAAR repair with stents on 1/29 with incidental finding of Right Renal Neoplasm, previous history of Left Renal Neoplasm with Left Nephrectomy in 2008, Bladder Cancer, Known LBBB, HTN, HLD transferred from [facility] after initially presenting with SOB which began last night. Patient states that he was discharged from the hospital following AAA repair and recovering well.  He was walking with walker at home with no complaints.  After going to bed, he was awoken from his sleep complaining of SOB with no relief.  He went to [facility] wtih his SOB getting progressively worse with 1 episode of vomitus before going to hospital.  Patient denies fever, chills, recent sick contact, Chest pain, Abdominal pain, diarrhea.  At [place], paient with elevate d-dimers and transferred for possible PE.  At [facility], patient with  RICKI in V2-V4 with elevated Trops, and ADHF requiring Bipap. (04 Feb 2018 17:51)    MEDICATIONS  (STANDING):  argatroban Infusion 1 MICROgram(s)/kG/Min (4.08 mL/Hr) IV Continuous <Continuous>  aspirin enteric coated 81 milliGRAM(s) Oral daily  atorvastatin 80 milliGRAM(s) Oral at bedtime  aztreonam  IVPB 500 milliGRAM(s) IV Intermittent every 12 hours  docusate sodium 100 milliGRAM(s) Oral daily  hydrALAZINE 100 milliGRAM(s) Oral every 8 hours  isosorbide   dinitrate Tablet (ISORDIL) 10 milliGRAM(s) Oral three times a day  metoprolol succinate ER 50 milliGRAM(s) Oral daily  senna 1 Tablet(s) Oral at bedtime    MEDICATIONS  (PRN):  diazepam    Tablet 10 milliGRAM(s) Oral two times a day PRN anxiety  polyethylene glycol 3350 17 Gram(s) Oral daily PRN Constipation      REVIEW OF SYSTEMS:  Otherwise, 10 point ROS done and otherwise negative.    PHYSICAL EXAM:  Vital Signs Last 24 Hrs  T(C): 37.2 (09 Feb 2018 06:00), Max: 37.2 (09 Feb 2018 06:00)  T(F): 98.9 (09 Feb 2018 06:00), Max: 98.9 (09 Feb 2018 06:00)  HR: 80 (09 Feb 2018 07:00) (76 - 110)  BP: --  BP(mean): --  RR: 25 (09 Feb 2018 07:00) (12 - 28)  SpO2: 95% (09 Feb 2018 07:00) (91% - 98%)  I&O's Summary    08 Feb 2018 07:01  -  09 Feb 2018 07:00  --------------------------------------------------------  IN: 407 mL / OUT: 0 mL / NET: 407 mL      Appearance: in NAD	  HEENT:   Normal oral mucosa, PERRL, EOMI. Right IJ cath clean and intact 	  Lymphatic: No lymphadenopathy  Cardiovascular: Normal S1 S2, No JVD, No murmurs, No edema  Respiratory: mild crackles at the bases, diminished breath sounds	  Psychiatry: A & O x 2-3, Mood & affect appropriate  Gastrointestinal:  Soft, Non-tender, + BS	  Skin: No rashes, No ecchymoses, No cyanosis. Site of stapes at the groin clean without signs of infection. No hematoma palpated over groin site 	  Neurologic: Non-focal  Extremities: Normal range of motion, No clubbing, cyanosis or edema  Vascular: Peripheral pulses palpable 2+ bilaterally    LABS:	 	                        8.1    13.8  )-----------( 72       ( 09 Feb 2018 03:18 )             22.9     Auto Eosinophil # x     / Auto Eosinophil % x     / Auto Neutrophil # x     / Auto Neutrophil % x     / BANDS % x                            8.6    11.5  )-----------( 141      ( 08 Feb 2018 03:39 )             23.9     Auto Eosinophil # x     / Auto Eosinophil % x     / Auto Neutrophil # x     / Auto Neutrophil % x     / BANDS % x        INR: 1.27 ratio (02-09 @ 05:24)  INR: 1.25 ratio (02-08 @ 03:39)  INR: 1.17 ratio (02-07 @ 21:05)    02-09    135  |  96  |  69<H>  ----------------------------<  135<H>  4.7   |  22  |  6.61<H>  02-08    134<L>  |  95<L>  |  52<H>  ----------------------------<  138<H>  3.9   |  24  |  5.92<H>    Ca    8.5      09 Feb 2018 03:18  Mg     2.6     02-09  Phos  4.6     02-09  TPro  6.3  /  Alb  3.2<L>  /  TBili  0.5  /  DBili  x   /  AST  32  /  ALT  19  /  AlkPhos  59  02-09  TPro  6.3  /  Alb  3.0<L>  /  TBili  0.4  /  DBili  x   /  AST  20  /  ALT  16  /  AlkPhos  55  02-08        proBNP: Serum Pro-Brain Natriuretic Peptide: 32280 pg/mL (02-04 @ 12:13)    Lipid Profile: 02-04 Chol 145 LDL 87 HDL 26<L> Trig 162<H>  HgA1c: 5.4 % (02-04 @ 21:37)    TSH: Thyroid Stimulating Hormone, Serum: 3.08 uIU/mL (02-04 @ 21:37)      CARDIAC MARKERS:   05 Feb 2018 05:05 Troponin 0.73 ng/mL / Creatine Kinase 156 U/L /  CKMB 14.0 ng/mL / CPK Mass Assay % 9.0 %   05 Feb 2018 01:17 Troponin 0.80 ng/mL / Creatine Kinase 158 U/L /  CKMB 15.2 ng/mL / CPK Mass Assay % 9.6 %   04 Feb 2018 16:51 Troponin 0.72 ng/mL / Creatine Kinase 151 U/L /  CKMB x     / CPK Mass Assay % x            TELEMETRY: Reviewed, no events	    ECG: NSR @79  RADIOLOGY:   OTHER: 	    PREVIOUS DIAGNOSTIC TESTING:    [x ] Echocardiogram: < from: Limited Transthoracic Echo (w/Cont) (02.05.18 @ 12:52) >  1. Severe global left ventricular systolic dysfunction with  regional variation. The distal septum, anterior wall, and  apex are akinetic. Endocardial visualization enhanced with  intravenous injection of echo contrast (Definity). No LV  thrombus seen. Ejection fraction by visual estimation is  25%.    < end of copied text >    [x ]  Catheterization:  found to have TVD (LAD, diag, OM1).  s/p renal angiogram, found to have 99% right renal stenosis  [ ] Stress Test:

## 2018-02-09 NOTE — PROGRESS NOTE ADULT - PROBLEM SELECTOR PLAN 1
Patient with DONNIE on CKD in setting of cardiorenal syndrome and ACE-I use and now hemodynamically significant WOOD: Baseline creatinine is between 1.2-1.3. Scr. on presentation was 6.48. Complements noted to be WNL. Patient with hemodynamically mediated DONNIE on CKI in setting of cardiorenal syndrome and ACE-I use? Patient was initiated on CVVHDF on 2/5/18 and transitioned to IHD. Scr. has worsened to 6.61 today.  Patient remains anuric. Renal ultrasound showed significant right renal artery stenosis. Plan is for CT angiogram of renal artery and stenting. Continue to monitor Scr, electrolytes, urine output. Avoid nephrotoxins. Patient with DONNIE on CKD in setting of cardiorenal syndrome and ACE-I use and now hemodynamically significant WOOD: Baseline creatinine is between 1.2-1.3. Scr. on presentation was 6.48. Complements noted to be WNL. Patient with hemodynamically mediated DONNIE on CKI in setting of cardiorenal syndrome and ARB use with critical WOOD.  Patient was initiated on CVVHDF on 2/5/18 and transitioned to IHD. Scr. has worsened to 6.61 today.  Patient remains anuric. Renal ultrasound showed significant right renal artery stenosis. Plan is for CT angiogram of renal artery followed by dialysis today. Continue to monitor Scr, electrolytes, urine output. Avoid nephrotoxins.

## 2018-02-10 LAB
ALBUMIN SERPL ELPH-MCNC: 2.9 G/DL — LOW (ref 3.3–5)
ALP SERPL-CCNC: 60 U/L — SIGNIFICANT CHANGE UP (ref 40–120)
ALT FLD-CCNC: 25 U/L RC — SIGNIFICANT CHANGE UP (ref 10–45)
ANION GAP SERPL CALC-SCNC: 25 MMOL/L — HIGH (ref 5–17)
APTT BLD: 62.1 SEC — HIGH (ref 27.5–37.4)
APTT BLD: 62.6 SEC — HIGH (ref 27.5–37.4)
APTT BLD: 63.4 SEC — HIGH (ref 27.5–37.4)
AST SERPL-CCNC: 35 U/L — SIGNIFICANT CHANGE UP (ref 10–40)
BILIRUB SERPL-MCNC: 0.4 MG/DL — SIGNIFICANT CHANGE UP (ref 0.2–1.2)
BUN SERPL-MCNC: 85 MG/DL — HIGH (ref 7–23)
CALCIUM SERPL-MCNC: 8.1 MG/DL — LOW (ref 8.4–10.5)
CHLORIDE SERPL-SCNC: 91 MMOL/L — LOW (ref 96–108)
CO2 SERPL-SCNC: 16 MMOL/L — LOW (ref 22–31)
CREAT SERPL-MCNC: 8.95 MG/DL — HIGH (ref 0.5–1.3)
GAS PNL BLDV: SIGNIFICANT CHANGE UP
GLUCOSE SERPL-MCNC: 120 MG/DL — HIGH (ref 70–99)
HCT VFR BLD CALC: 23.4 % — LOW (ref 39–50)
HGB BLD-MCNC: 8.3 G/DL — LOW (ref 13–17)
INR BLD: 2.56 RATIO — HIGH (ref 0.88–1.16)
INR BLD: 2.77 RATIO — HIGH (ref 0.88–1.16)
LACTATE SERPL-SCNC: 0.7 MMOL/L — SIGNIFICANT CHANGE UP (ref 0.7–2)
MAGNESIUM SERPL-MCNC: 2.4 MG/DL — SIGNIFICANT CHANGE UP (ref 1.6–2.6)
MCHC RBC-ENTMCNC: 33.5 PG — SIGNIFICANT CHANGE UP (ref 27–34)
MCHC RBC-ENTMCNC: 35.6 GM/DL — SIGNIFICANT CHANGE UP (ref 32–36)
MCV RBC AUTO: 94 FL — SIGNIFICANT CHANGE UP (ref 80–100)
PHOSPHATE SERPL-MCNC: 6.9 MG/DL — HIGH (ref 2.5–4.5)
PLATELET # BLD AUTO: 108 K/UL — LOW (ref 150–400)
POTASSIUM SERPL-MCNC: 5.4 MMOL/L — HIGH (ref 3.5–5.3)
POTASSIUM SERPL-SCNC: 5.4 MMOL/L — HIGH (ref 3.5–5.3)
PROT SERPL-MCNC: 6.6 G/DL — SIGNIFICANT CHANGE UP (ref 6–8.3)
PROTHROM AB SERPL-ACNC: 28.5 SEC — HIGH (ref 9.8–12.7)
PROTHROM AB SERPL-ACNC: 30.5 SEC — HIGH (ref 9.8–12.7)
RBC # BLD: 2.48 M/UL — LOW (ref 4.2–5.8)
RBC # FLD: 12.3 % — SIGNIFICANT CHANGE UP (ref 10.3–14.5)
SODIUM SERPL-SCNC: 132 MMOL/L — LOW (ref 135–145)
WBC # BLD: 16.8 K/UL — HIGH (ref 3.8–10.5)
WBC # FLD AUTO: 16.8 K/UL — HIGH (ref 3.8–10.5)

## 2018-02-10 PROCEDURE — 74174 CTA ABD&PLVS W/CONTRAST: CPT | Mod: 26

## 2018-02-10 PROCEDURE — 99233 SBSQ HOSP IP/OBS HIGH 50: CPT | Mod: GC

## 2018-02-10 PROCEDURE — 71045 X-RAY EXAM CHEST 1 VIEW: CPT | Mod: 26

## 2018-02-10 RX ADMIN — Medication 10 MILLIGRAM(S): at 11:00

## 2018-02-10 RX ADMIN — Medication 50 MILLIGRAM(S): at 22:31

## 2018-02-10 RX ADMIN — ISOSORBIDE DINITRATE 10 MILLIGRAM(S): 5 TABLET ORAL at 22:31

## 2018-02-10 RX ADMIN — Medication 100 MILLIGRAM(S): at 22:31

## 2018-02-10 RX ADMIN — Medication 10 MILLIGRAM(S): at 23:26

## 2018-02-10 RX ADMIN — ISOSORBIDE DINITRATE 10 MILLIGRAM(S): 5 TABLET ORAL at 15:43

## 2018-02-10 RX ADMIN — Medication 10 MILLIGRAM(S): at 00:16

## 2018-02-10 RX ADMIN — ATORVASTATIN CALCIUM 80 MILLIGRAM(S): 80 TABLET, FILM COATED ORAL at 22:31

## 2018-02-10 RX ADMIN — Medication 50 MILLIGRAM(S): at 05:05

## 2018-02-10 RX ADMIN — Medication 100 MILLIGRAM(S): at 15:43

## 2018-02-10 RX ADMIN — ISOSORBIDE DINITRATE 10 MILLIGRAM(S): 5 TABLET ORAL at 05:05

## 2018-02-10 RX ADMIN — Medication 100 MILLIGRAM(S): at 05:05

## 2018-02-10 RX ADMIN — Medication 81 MILLIGRAM(S): at 11:00

## 2018-02-10 RX ADMIN — Medication 50 MILLIGRAM(S): at 05:04

## 2018-02-10 NOTE — PROGRESS NOTE ADULT - ASSESSMENT
77 year old man with past medical history of AAA 5.5cm s/p EVAAR repair on 1/29 with incidental finding of Right Neoplasm, previous history of Left Neoplasm with Left Nephrectomy in 2009, Bladder Cancer, Known LBBB, HTN, HLD presents with hypoxic respiratory failure 2/2 to pulmonary edema  2/2 ADHF + multifocal PNA in the setting of DONNIE on CKD with evidence of severe renal artery stenosis and TVD on cath    Neuro: AOx2-3  Confused at times, likely delirium in the setting of hospital stay. Able to easily re-orient    CV: Pulmonary edema 2/2 to ADHF + volume overload in the setting of acute renal failure. Now resolved s/p dialysis   LHC with triple vessel disease, PCI planned after renal artery intervention. Continue with aspirin, bb, statin  TTE with evidence of severe LV dysfunction, Given LHC findings, likely ischemic cardiomyopathy.   Continue with hydralazine, isordil, low dose bb  for preload/afterload reduction. Continue to monitor oxy sat  Afib with RVR: new onset, in the setting of sepsis, HF. No repeat episodes Continue with BB, uptitrate as tolerated. CHADSVASC score of 4, started on a heparin gtt, now on argatroban gtt given concern for HIT. Monitor bleeding       Pulm: Evidence of multifocal pna on xray. Given recent hospitalization, will treat for HCAP for total of 7 days. Blood cx NGTD  Wean off oxygen as tolerated    Renal: DONNIE on CKD in the setting of recent surgery, iv contrast for imaging. Renal US significant for high grade stenosis at the origin of the renal artery. Plan for the Cath of the renal artery with likely stenting. Renal artery could not be engaged, unclear relation of stent structure to artery.   CTA aorta with 1mm cuts to eval renal artery takeoff.  Plan for HD today after CTA. K elevated although specimen mildly hemolyzed. Monitor Scr, electrolytes, UOP.    Heme/Onc: Spoke with pt's outpatient urologist Dr. Santiago. He is aware of the lesion on pt's right kidney but says given its size, does not require intervention at this time and states we should prioritize treating potential ischemic disease. He will continue to monitor the mass as outpatient.   R/o HIT: HIT score ~4-5. 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 Awaiting PRADEEP for confirmation. For now continue with argatroban gtt    ID: Pt initially presented with severe sepsis likely 2/2 to PNA with signs of end organ dysfunction (donnie, elevated trops) now improving s/p abx. WBC downtrending. Vanc level high, held dose. Continue to trend    GI: DASH diet, no issues    DVT ppx: hep 77 year old man with past medical history of AAA 5.5cm s/p EVAAR repair on 1/29 with incidental finding of Right Neoplasm, previous history of Left Neoplasm with Left Nephrectomy in 2009, Bladder Cancer, Known LBBB, HTN, HLD presents with hypoxic respiratory failure 2/2 to pulmonary edema  2/2 ADHF + multifocal PNA in the setting of DONNIE on CKD with evidence of severe renal artery stenosis and TVD on cath    Neuro: AOx2-3  Confused at times, likely delirium in the setting of hospital stay. Able to easily re-orient    CV: Pulmonary edema 2/2 to ADHF + volume overload in the setting of acute renal failure. Now resolved s/p dialysis   LHC with triple vessel disease, PCI planned after renal artery intervention. Continue with aspirin, bb, statin  TTE with evidence of severe LV dysfunction, Given LHC findings, likely ischemic cardiomyopathy.   Continue with hydralazine, isordil, low dose bb  for preload/afterload reduction. Continue to monitor oxy sat  Afib with RVR: new onset, in the setting of sepsis, HF. No repeat episodes Continue with BB, uptitrate as tolerated. CHADSVASC score of 4, started on a heparin gtt, now on argatroban gtt given concern for HIT. Monitor bleeding       Pulm: Evidence of multifocal pna on xray. Given recent hospitalization, will treat for HCAP for total of 7 days. Blood cx NGTD  Wean off oxygen as tolerated    Renal: DONNIE on CKD in the setting of recent surgery, iv contrast for imaging. Renal US significant for high grade stenosis at the origin of the renal artery. Plan for the Cath of the renal artery with likely stenting. Renal artery could not be engaged, unclear relation of stent structure to artery.   CTA aorta with 1mm cuts to eval renal artery takeoff.  Plan for HD today after CTA. Volume overloaded and K elevated although specimen mildly hemolyzed. Monitor Scr, electrolytes, UOP.    Heme/Onc: Spoke with pt's outpatient urologist Dr. Santiago. He is aware of the lesion on pt's right kidney but says given its size, does not require intervention at this time and states we should prioritize treating potential ischemic disease. He will continue to monitor the mass as outpatient.   R/o HIT: HIT score ~4-5. 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 Awaiting PRADEEP for confirmation. For now continue with argatroban gtt    ID: Pt initially presented with severe sepsis likely 2/2 to PNA with signs of end organ dysfunction (donnie, elevated trops) now improving s/p abx. WBC downtrending. Vanc level high, held dose. Continue to trend    GI: DASH diet, no issues    DVT ppx: hep

## 2018-02-10 NOTE — PROGRESS NOTE ADULT - SUBJECTIVE AND OBJECTIVE BOX
Patient is a 77y old  Male who presents with a chief complaint of Shortness of Breath (04 Feb 2018 17:51)    Event: Pt unable to get CTA due to lack of access. Otherwise no acute events, denies chest pain, SOB or n/v.	  HPI:  This is a 77 year old man with past medical history of AAA 5.5cm s/p EVAAR repair with stents on 1/29 with incidental finding of Right Renal Neoplasm, previous history of Left Renal Neoplasm with Left Nephrectomy in 2008, Bladder Cancer, Known LBBB, HTN, HLD transferred from [facility] after initially presenting with SOB which began last night. Patient states that he was discharged from the hospital following AAA repair and recovering well.  He was walking with walker at home with no complaints.  After going to bed, he was awoken from his sleep complaining of SOB with no relief.  He went to [facility] wtih his SOB getting progressively worse with 1 episode of vomitus before going to hospital.  Patient denies fever, chills, recent sick contact, Chest pain, Abdominal pain, diarrhea.  At [facility], paient with elevate d-dimers and transferred for possible PE.  At [facility], patient with  RICKI in V2-V4 with elevated Trops, and ADHF requiring Bipap. (04 Feb 2018 17:51)    MEDICATIONS  (STANDING):  argatroban Infusion 3 MICROgram(s)/kG/Min (12.24 mL/Hr) IV Continuous <Continuous>  aspirin enteric coated 81 milliGRAM(s) Oral daily  atorvastatin 80 milliGRAM(s) Oral at bedtime  aztreonam  IVPB 500 milliGRAM(s) IV Intermittent every 12 hours  docusate sodium 100 milliGRAM(s) Oral daily  hydrALAZINE 100 milliGRAM(s) Oral every 8 hours  isosorbide   dinitrate Tablet (ISORDIL) 10 milliGRAM(s) Oral three times a day  metoprolol succinate ER 50 milliGRAM(s) Oral daily  senna 1 Tablet(s) Oral at bedtime    MEDICATIONS  (PRN):  diazepam    Tablet 10 milliGRAM(s) Oral two times a day PRN anxiety  polyethylene glycol 3350 17 Gram(s) Oral daily PRN Constipation      REVIEW OF SYSTEMS:  Otherwise, 10 point ROS done and otherwise negative.    PHYSICAL EXAM:  Vital Signs Last 24 Hrs  T(C): 36.7 (10 Feb 2018 03:00), Max: 36.7 (10 Feb 2018 00:00)  T(F): 98 (10 Feb 2018 03:00), Max: 98.1 (10 Feb 2018 00:00)  HR: 96 (10 Feb 2018 06:00) (78 - 110)  BP: --  BP(mean): --  RR: 23 (10 Feb 2018 06:00) (15 - 34)  SpO2: 94% (10 Feb 2018 06:00) (88% - 98%)  I&O's Summary    09 Feb 2018 07:01  -  10 Feb 2018 07:00  --------------------------------------------------------  IN: 822 mL / OUT: 0 mL / NET: 822 mL      Appearance: in NAD	  HEENT:   Normal oral mucosa, PERRL, EOMI. Right IJ cath clean and intact 	  Lymphatic: No lymphadenopathy  Cardiovascular: Normal S1 S2, No JVD, No murmurs, No edema  Respiratory: mild crackles at the bases, diminished breath sounds	  Psychiatry: A & O x 2-3, Mood & affect appropriate  Gastrointestinal:  Soft, Non-tender, + BS	  Skin: No rashes, No ecchymoses, No cyanosis. Site of stapes at the groin clean with mild erythema. No hematoma palpated over groin site 	  Neurologic: Non-focal  Extremities: Normal range of motion, No clubbing, cyanosis or edema  Vascular: Peripheral pulses palpable 2+ bilaterally    LABS:	 	                        8.3    16.8  )-----------( 108      ( 10 Feb 2018 05:35 )             23.4     Auto Eosinophil # x     / Auto Eosinophil % x     / Auto Neutrophil # x     / Auto Neutrophil % x     / BANDS % x                            8.2    17.1  )-----------( 86       ( 09 Feb 2018 16:42 )             23.2     Auto Eosinophil # x     / Auto Eosinophil % x     / Auto Neutrophil # x     / Auto Neutrophil % x     / BANDS % x                            8.1    13.8  )-----------( 72       ( 09 Feb 2018 03:18 )             22.9     Auto Eosinophil # x     / Auto Eosinophil % x     / Auto Neutrophil # x     / Auto Neutrophil % x     / BANDS % x        INR: 2.77 ratio (02-10 @ 05:35)  INR: 2.09 ratio (02-09 @ 16:42)  INR: 1.27 ratio (02-09 @ 05:24)    02-10    132<L>  |  91<L>  |  85<H>  ----------------------------<  120<H>  5.4<H>   |  16<L>  |  8.95<H>  02-09    134<L>  |  94<L>  |  77<H>  ----------------------------<  108<H>  4.8   |  19<L>  |  8.08<H>  02-09    135  |  96  |  69<H>  ----------------------------<  135<H>  4.7   |  22  |  6.61<H>    Ca    8.1<L>      10 Feb 2018 05:35  Mg     2.4     02-10  Phos  6.9     02-10  TPro  6.6  /  Alb  2.9<L>  /  TBili  0.4  /  DBili  x   /  AST  35  /  ALT  25  /  AlkPhos  60  02-10  TPro  6.8  /  Alb  3.1<L>  /  TBili  0.5  /  DBili  x   /  AST  33  /  ALT  25  /  AlkPhos  60  02-09  TPro  6.3  /  Alb  3.2<L>  /  TBili  0.5  /  DBili  x   /  AST  32  /  ALT  19  /  AlkPhos  59  02-09    Lactate, Blood: 0.7 mmol/L (02-10 @ 05:35)      proBNP:   Lipid Profile: 02-04 Chol 145 LDL 87 HDL 26<L> Trig 162<H>  HgA1c: 5.4 % (02-04 @ 21:37)    TSH:     CARDIAC MARKERS:   05 Feb 2018 05:05 Troponin 0.73 ng/mL / Creatine Kinase 156 U/L /  CKMB 14.0 ng/mL / CPK Mass Assay % 9.0 %   05 Feb 2018 01:17 Troponin 0.80 ng/mL / Creatine Kinase 158 U/L /  CKMB 15.2 ng/mL / CPK Mass Assay % 9.6 %   04 Feb 2018 16:51 Troponin 0.72 ng/mL / Creatine Kinase 151 U/L /  CKMB x     / CPK Mass Assay % x            TELEMETRY: Reviewed, no events	    ECG:  	  RADIOLOGY: patchy opacities likely resolving multifocal pna compared to prior xray  OTHER: 	    PREVIOUS DIAGNOSTIC TESTING:    [ x] Echocardiogram: < from: Limited Transthoracic Echo (02.09.18 @ 11:00) >  1. Mitral annular calcification and calcified mitral  leaflets with normal diastolic opening. Mild-moderate  mitral regurgitation.  2. Calcified aortic valve with decreased opening. Peak  transaortic valve gradient equals 16 mm Hg, mean  transaortic valve gradient equals 8 mm Hg, estimated aortic  valve area equals 1.1 sqcm (by continuity equation), aortic  valve velocity time integral equals 37 cm, consistent with  moderate aortic stenosis. Mild aortic regurgitation.  3. Severe global left ventricular systolic dysfunction with  regional variation.  *** Compared with echocardiogram of 2/5/2018, no  significant changes noted in the left ventricular systolic  function.    < end of copied text >    [ ]  Catheterization:  [ ] Stress Test: Patient is a 77y old  Male who presents with a chief complaint of Shortness of Breath (04 Feb 2018 17:51)    Event: Pt unable to get CTA due to lack of access. Tachypnic and SOB this morning, put on supp oxygen. Otherwise no acute events, denies chest pain, palpitations or n/v.	  HPI:  This is a 77 year old man with past medical history of AAA 5.5cm s/p EVAAR repair with stents on 1/29 with incidental finding of Right Renal Neoplasm, previous history of Left Renal Neoplasm with Left Nephrectomy in 2008, Bladder Cancer, Known LBBB, HTN, HLD transferred from [facility] after initially presenting with SOB which began last night. Patient states that he was discharged from the hospital following AAA repair and recovering well.  He was walking with walker at home with no complaints.  After going to bed, he was awoken from his sleep complaining of SOB with no relief.  He went to [facility] wtih his SOB getting progressively worse with 1 episode of vomitus before going to hospital.  Patient denies fever, chills, recent sick contact, Chest pain, Abdominal pain, diarrhea.  At [facility], paient with elevate d-dimers and transferred for possible PE.  At [facility], patient with  RICKI in V2-V4 with elevated Trops, and ADHF requiring Bipap. (04 Feb 2018 17:51)    MEDICATIONS  (STANDING):  argatroban Infusion 3 MICROgram(s)/kG/Min (12.24 mL/Hr) IV Continuous <Continuous>  aspirin enteric coated 81 milliGRAM(s) Oral daily  atorvastatin 80 milliGRAM(s) Oral at bedtime  aztreonam  IVPB 500 milliGRAM(s) IV Intermittent every 12 hours  docusate sodium 100 milliGRAM(s) Oral daily  hydrALAZINE 100 milliGRAM(s) Oral every 8 hours  isosorbide   dinitrate Tablet (ISORDIL) 10 milliGRAM(s) Oral three times a day  metoprolol succinate ER 50 milliGRAM(s) Oral daily  senna 1 Tablet(s) Oral at bedtime    MEDICATIONS  (PRN):  diazepam    Tablet 10 milliGRAM(s) Oral two times a day PRN anxiety  polyethylene glycol 3350 17 Gram(s) Oral daily PRN Constipation      REVIEW OF SYSTEMS:  Otherwise, 10 point ROS done and otherwise negative.    PHYSICAL EXAM:  Vital Signs Last 24 Hrs  T(C): 36.7 (10 Feb 2018 03:00), Max: 36.7 (10 Feb 2018 00:00)  T(F): 98 (10 Feb 2018 03:00), Max: 98.1 (10 Feb 2018 00:00)  HR: 96 (10 Feb 2018 06:00) (78 - 110)  BP: --  BP(mean): --  RR: 23 (10 Feb 2018 06:00) (15 - 34)  SpO2: 94% (10 Feb 2018 06:00) (88% - 98%)  I&O's Summary    09 Feb 2018 07:01  -  10 Feb 2018 07:00  --------------------------------------------------------  IN: 822 mL / OUT: 0 mL / NET: 822 mL      Appearance: in NAD	  HEENT:   Normal oral mucosa, PERRL, EOMI. Right IJ cath clean and intact 	  Lymphatic: No lymphadenopathy  Cardiovascular: Normal S1 S2, No JVD, No murmurs, No edema  Respiratory: crackles diffusely, diminished breath sounds	  Psychiatry: A & O x 2-3, Mood & affect appropriate  Gastrointestinal:  Soft, Non-tender, + BS	  Skin: No rashes, No ecchymoses, No cyanosis. Site of stapes at the groin clean with mild erythema. No hematoma palpated over groin site 	  Neurologic: Non-focal  Extremities: Normal range of motion, No clubbing, cyanosis or edema  Vascular: Peripheral pulses palpable 2+ bilaterally    LABS:	 	                        8.3    16.8  )-----------( 108      ( 10 Feb 2018 05:35 )             23.4     Auto Eosinophil # x     / Auto Eosinophil % x     / Auto Neutrophil # x     / Auto Neutrophil % x     / BANDS % x                            8.2    17.1  )-----------( 86       ( 09 Feb 2018 16:42 )             23.2     Auto Eosinophil # x     / Auto Eosinophil % x     / Auto Neutrophil # x     / Auto Neutrophil % x     / BANDS % x                            8.1    13.8  )-----------( 72       ( 09 Feb 2018 03:18 )             22.9     Auto Eosinophil # x     / Auto Eosinophil % x     / Auto Neutrophil # x     / Auto Neutrophil % x     / BANDS % x        INR: 2.77 ratio (02-10 @ 05:35)  INR: 2.09 ratio (02-09 @ 16:42)  INR: 1.27 ratio (02-09 @ 05:24)    02-10    132<L>  |  91<L>  |  85<H>  ----------------------------<  120<H>  5.4<H>   |  16<L>  |  8.95<H>  02-09    134<L>  |  94<L>  |  77<H>  ----------------------------<  108<H>  4.8   |  19<L>  |  8.08<H>  02-09    135  |  96  |  69<H>  ----------------------------<  135<H>  4.7   |  22  |  6.61<H>    Ca    8.1<L>      10 Feb 2018 05:35  Mg     2.4     02-10  Phos  6.9     02-10  TPro  6.6  /  Alb  2.9<L>  /  TBili  0.4  /  DBili  x   /  AST  35  /  ALT  25  /  AlkPhos  60  02-10  TPro  6.8  /  Alb  3.1<L>  /  TBili  0.5  /  DBili  x   /  AST  33  /  ALT  25  /  AlkPhos  60  02-09  TPro  6.3  /  Alb  3.2<L>  /  TBili  0.5  /  DBili  x   /  AST  32  /  ALT  19  /  AlkPhos  59  02-09    Lactate, Blood: 0.7 mmol/L (02-10 @ 05:35)      proBNP:   Lipid Profile: 02-04 Chol 145 LDL 87 HDL 26<L> Trig 162<H>  HgA1c: 5.4 % (02-04 @ 21:37)    TSH:     CARDIAC MARKERS:   05 Feb 2018 05:05 Troponin 0.73 ng/mL / Creatine Kinase 156 U/L /  CKMB 14.0 ng/mL / CPK Mass Assay % 9.0 %   05 Feb 2018 01:17 Troponin 0.80 ng/mL / Creatine Kinase 158 U/L /  CKMB 15.2 ng/mL / CPK Mass Assay % 9.6 %   04 Feb 2018 16:51 Troponin 0.72 ng/mL / Creatine Kinase 151 U/L /  CKMB x     / CPK Mass Assay % x            TELEMETRY: Reviewed, no events	    ECG:  	  RADIOLOGY: patchy opacities likely resolving multifocal pna compared to prior xray  OTHER: 	    PREVIOUS DIAGNOSTIC TESTING:    [ x] Echocardiogram: < from: Limited Transthoracic Echo (02.09.18 @ 11:00) >  1. Mitral annular calcification and calcified mitral  leaflets with normal diastolic opening. Mild-moderate  mitral regurgitation.  2. Calcified aortic valve with decreased opening. Peak  transaortic valve gradient equals 16 mm Hg, mean  transaortic valve gradient equals 8 mm Hg, estimated aortic  valve area equals 1.1 sqcm (by continuity equation), aortic  valve velocity time integral equals 37 cm, consistent with  moderate aortic stenosis. Mild aortic regurgitation.  3. Severe global left ventricular systolic dysfunction with  regional variation.  *** Compared with echocardiogram of 2/5/2018, no  significant changes noted in the left ventricular systolic  function.    < end of copied text >    [ ]  Catheterization:  [ ] Stress Test:

## 2018-02-10 NOTE — PROGRESS NOTE ADULT - PROBLEM SELECTOR PLAN 1
Patient with DONNIE on CKD in setting of cardiorenal syndrome and ACE-I use and now hemodynamically significant WOOD: Baseline creatinine is between 1.2-1.3. Scr. on presentation was 6.48. Complements noted to be WNL. Patient with hemodynamically mediated DONNIE on CKI in setting of cardiorenal syndrome and ARB use with critical WOOD.  Patient was initiated on CVVHDF on 2/5/18 and transitioned to IHD. Scr. has worsened to 8.95 today.  Patient remains anuric. Renal ultrasound showed significant right renal artery stenosis. Plan is for CT angiogram of renal artery followed by dialysis today. Continue to monitor Scr, electrolytes, urine output. Avoid nephrotoxins.

## 2018-02-10 NOTE — PROGRESS NOTE ADULT - SUBJECTIVE AND OBJECTIVE BOX
[facility] Division of Kidney Diseases & Hypertension  INITIAL CONSULT NOTE  [phone]--------------------------------------------------------------------------------  HPI:    HPI: 77 year old man with past medical history of AAA 5.5cm s/p EVAAR repair on  with incidental finding of Right Neoplasm, previous history of Left Neoplasm with Left Nephrectomy in  transferred from [facility] for worsening SOB. Patient was found to have DONNIE and fluid overload and was initiated on CVVHDF on 17 and was stopped on 18.  Had rapid afib with HD.  Ultrasound suggestive of high grade right renal artery narrowing.  Patient was schedule for CT angiogram yesterday but was unable to do so because of no IV access. On evaluation today the patient is still not urinating. He denies complains of SOB, CP, abdominal pain, nausea, vomiting.       PAST HISTORY  --------------------------------------------------------------------------------  PAST MEDICAL & SURGICAL HISTORY:  Renal mass, right: current - following with Dr Santiago  BPH (benign prostatic hyperplasia)  Abdominal aortic aneurysm (AAA): 5.5 cm  Heel spur, left  Vertigo  Left bundle branch block  Hx antineoplastic chemotherapy (BCG )  Lt Renal Neoplasm: left - s/p left nephrectomy  Hyperlipemia (ICD9 272.4): dx   Bladder Cancer (ICD9 188.9): TCC, dx   HTN: dx   H/O abdominal aortic aneurysm repair  History of cystoscopy: 2015  History of Lt  Nephrectomy: 6/10 - left  S/P Tonsillectomy  S/P Cystoscopy: bladder polyps removal multiple times (since ), 6/10 , 10/2011 &amp; 10/17/2011, , 2012, last 13, 2013, 2014  urethral Stent 2008: stent placement and removal    FAMILY HISTORY:  Family history of MI (myocardial infarction) (Father, Mother): father  74y/o MI, mother  96y/o alzheimers    PAST SOCIAL HISTORY:    ALLERGIES & MEDICATIONS  --------------------------------------------------------------------------------  Allergies    Cipro (Other)  Levaquin (Other)  penicillin (Hives)    Intolerances      Standing Inpatient Medications  argatroban Infusion 3 MICROgram(s)/kG/Min IV Continuous <Continuous>  aspirin enteric coated 81 milliGRAM(s) Oral daily  atorvastatin 80 milliGRAM(s) Oral at bedtime  aztreonam  IVPB 500 milliGRAM(s) IV Intermittent every 12 hours  docusate sodium 100 milliGRAM(s) Oral daily  hydrALAZINE 100 milliGRAM(s) Oral every 8 hours  isosorbide   dinitrate Tablet (ISORDIL) 10 milliGRAM(s) Oral three times a day  metoprolol succinate ER 50 milliGRAM(s) Oral daily  senna 1 Tablet(s) Oral at bedtime    PRN Inpatient Medications  diazepam    Tablet 10 milliGRAM(s) Oral two times a day PRN  polyethylene glycol 3350 17 Gram(s) Oral daily PRN      REVIEW OF SYSTEMS  --------------------------------------------------------------------------------  Gen: No  fevers/chills  Head/Eyes/Ears/Mouth: No headache  Respiratory: no dyspnea  CV: No chest pain  GI: No abdominal pain  : still anuric    VITALS/PHYSICAL EXAM  --------------------------------------------------------------------------------  T(C): 37 (02-10-18 @ 08:00), Max: 37 (02-10-18 @ 08:00)  HR: 94 (02-10-18 @ 09:00) (86 - 110)  BP: --  RR: 22 (02-10-18 @ 09:00) (16 - 34)  SpO2: 93% (02-10-18 @ 09:) (88% - 96%)  Wt(kg): --        18 @ 07:01  -  02-10-18 @ 07:00  --------------------------------------------------------  IN: 834.2 mL / OUT: 0 mL / NET: 834.2 mL    02-10-18 @ 07:01  -  02-10-18 @ 09:25  --------------------------------------------------------  IN: 24.4 mL / OUT: 0 mL / NET: 24.4 mL      Physical Exam:  	            Gen: NAD   	HEENT: anicteric  	Pulm: crackles present over lung bases   	CV: RRR  	Abd: soft, nontender, nondistended  	LE: no B/L pedal edema present.   	Skin: Warm and dry   	Vascular access: right non tunneled HD catheter present.       LABS/STUDIES  --------------------------------------------------------------------------------              8.3    16.8  >-----------<  108      [02-10-18 @ 05:35]              23.4     132  |  91  |  85  ----------------------------<  120      [02-10-18 @ 05:35]  5.4   |  16  |  8.95        Ca     8.1     [02-10-18 @ 05:35]      Mg     2.4     [02-10-18 @ 05:35]      Phos  6.9     [02-10-18 @ 05:35]    TPro  6.6  /  Alb  2.9  /  TBili  0.4  /  DBili  x   /  AST  35  /  ALT  25  /  AlkPhos  60  [02-10-18 @ 05:35]    PT/INR: PT 30.5 , INR 2.77       [02-10-18 @ 05:35]  PTT: 62.1       [02-10-18 @ 05:35]      Creatinine Trend:  SCr 8.95 [02-10 @ 05:35]  SCr 8.08 [ 16:42]  SCr 6.61 [ 03:18]  SCr 5.92 [ 03:38]  SCr 5.74 [ 03:30]    Urinalysis - [18 @ 13:17]      Color Yellow / Appearance Clear / SG 1.020 / pH 5.0      Gluc 50 / Ketone Negative  / Bili Negative / Urobili Negative       Blood Trace / Protein 30 / Leuk Est Trace / Nitrite Negative      RBC 0-2 / WBC 0-2 / Hyaline  / Gran  / Sq Epi  / Non Sq Epi Few / Bacteria Trace      HbA1c 5.4      [18 @ 21:37]  TSH 3.08      [18 21:37]  Lipid: chol 145, , HDL 26, LDL 87      [18 21:37]    HBsAb <3.0      [18 23:02]  HBsAg Nonreact      [18 23:02]  HCV 0.11, Nonreact      [18 23:02]    C3 Complement 157      [18 @ 21:15]  C4 Complement 35      [18 21:15]

## 2018-02-10 NOTE — CHART NOTE - NSCHARTNOTEFT_GEN_A_CORE
====================  CCU MIDNIGHT ROUNDS  ====================    DEUCE BALL  [id number]    ====================  SUMMARY:  ====================    76 yo M AAA (5.5 cm s/p EVAAR 1/29), R kidney neoplasm (incidental finding), prior L kidney neoplasm (L nephrectomy 2008), bladder Ca, known LBBB, HTN, HLD, transferred from [place] w/ SOB, found to have ADHF, multifocal PNA, AKA on CKD req CVV (stopped 1200 2/6), now on HD, w/ LHC 2/8 w/ TVD, found 99% stenosis R renal artery. C/b rapid a fib. 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 600 out w/ HD. briefly in a fib as high as 150s ~ 1 hr post HD, now in sinus.    ====================  VITALS (Last 12 hrs):  ====================    T(C): 36.7 (02-10-18 @ 19:55), Max: 36.9 (02-10-18 @ 16:45)  HR: 128 (02-10-18 @ 20:00) (94 - 128)  ABP: 106/48 (02-10-18 @ 20:00) (106/48 - 142/64)  ABP(mean): 66 (02-10-18 @ 20:00) (66 - 92)  RR: 38 (02-10-18 @ 20:00) (26 - 38)  SpO2: 96% (02-10-18 @ 20:00) (94% - 99%)    TELEMETRY: sinus 100s     I&O's Summary    09 Feb 2018 07:01  -  10 Feb 2018 07:00  --------------------------------------------------------  IN: 834.2 mL / OUT: 0 mL / NET: 834.2 mL    10 Feb 2018 07:01  -  10 Feb 2018 23:49  --------------------------------------------------------  IN: 769.8 mL / OUT: 1100 mL / NET: -330.2 mL    ====================  PLAN:  ====================  - check vanco level post HD, dose vanco if needed, c/w azactam  - fluid removal per HD   - c/w asa, lipitor   - c/w hydralazine, toprol, isordil  - argatroban for AC ([id number] 5 ), f/u PRADEEP , uptitrate BB as needed     Brianda Shultz [facility]/CCU  #[id number]

## 2018-02-10 NOTE — PROGRESS NOTE ADULT - ASSESSMENT
77 M presents with donnie on ckd. s/p EVAAR repair on 1/29 with incidental finding of Right Neoplasm, previous history of Left Neoplasm with Left Nephrectomy in 2009 found to have DONNIE on CKD in setting of fluid overload; initiated on CVVHDF on 2/4/18 now on IHD

## 2018-02-10 NOTE — CHART NOTE - NSCHARTNOTEFT_GEN_A_CORE
78 y/o male with RCC s/p nephrectomy s/p EVAR presents with ADHF and DONNIE with CKD requiring HD, with renal artery stenosis s/p C TVD no intervention. CT Abd/pelvis on hold d/t poor access. No with SOB.  - will place TLC for IV contrast for CT  - c/w Argatroban gtt for AF, f/u HIT panel and PRADEEP  - follow up CTA to define anatomy for procedure  - c/w ASA, high intensity statin, hydralazine, isordil, toprol  - continue aztreonam, vanco by level    Sy Monteiro MD  Cardiology fellow

## 2018-02-10 NOTE — PROGRESS NOTE ADULT - PROBLEM SELECTOR PLAN 2
Serum potassium is 5.4( moderately hemolyzed). Low potassium diet recommended. Repeat serum K level.

## 2018-02-11 DIAGNOSIS — D75.82 HEPARIN INDUCED THROMBOCYTOPENIA (HIT): ICD-10-CM

## 2018-02-11 DIAGNOSIS — E83.39 OTHER DISORDERS OF PHOSPHORUS METABOLISM: ICD-10-CM

## 2018-02-11 LAB
ALBUMIN SERPL ELPH-MCNC: 2.9 G/DL — LOW (ref 3.3–5)
ALP SERPL-CCNC: 60 U/L — SIGNIFICANT CHANGE UP (ref 40–120)
ALT FLD-CCNC: 30 U/L RC — SIGNIFICANT CHANGE UP (ref 10–45)
ANION GAP SERPL CALC-SCNC: 19 MMOL/L — HIGH (ref 5–17)
APTT BLD: 68.6 SEC — HIGH (ref 27.5–37.4)
AST SERPL-CCNC: 40 U/L — SIGNIFICANT CHANGE UP (ref 10–40)
BASOPHILS # BLD AUTO: 0 K/UL — SIGNIFICANT CHANGE UP (ref 0–0.2)
BASOPHILS NFR BLD AUTO: 0.1 % — SIGNIFICANT CHANGE UP (ref 0–2)
BILIRUB SERPL-MCNC: 0.4 MG/DL — SIGNIFICANT CHANGE UP (ref 0.2–1.2)
BLD GP AB SCN SERPL QL: NEGATIVE — SIGNIFICANT CHANGE UP
BUN SERPL-MCNC: 71 MG/DL — HIGH (ref 7–23)
CALCIUM SERPL-MCNC: 8.1 MG/DL — LOW (ref 8.4–10.5)
CHLORIDE SERPL-SCNC: 91 MMOL/L — LOW (ref 96–108)
CO2 SERPL-SCNC: 22 MMOL/L — SIGNIFICANT CHANGE UP (ref 22–31)
CREAT SERPL-MCNC: 7.27 MG/DL — HIGH (ref 0.5–1.3)
EOSINOPHIL # BLD AUTO: 0 K/UL — SIGNIFICANT CHANGE UP (ref 0–0.5)
EOSINOPHIL NFR BLD AUTO: 0.2 % — SIGNIFICANT CHANGE UP (ref 0–6)
GLUCOSE SERPL-MCNC: 150 MG/DL — HIGH (ref 70–99)
HCT VFR BLD CALC: 19.4 % — CRITICAL LOW (ref 39–50)
HCT VFR BLD CALC: 20.5 % — CRITICAL LOW (ref 39–50)
HGB BLD-MCNC: 6.8 G/DL — CRITICAL LOW (ref 13–17)
HGB BLD-MCNC: 7.1 G/DL — LOW (ref 13–17)
INR BLD: 2.99 RATIO — HIGH (ref 0.88–1.16)
LACTATE SERPL-SCNC: 0.9 MMOL/L — SIGNIFICANT CHANGE UP (ref 0.7–2)
LYMPHOCYTES # BLD AUTO: 0.4 K/UL — LOW (ref 1–3.3)
LYMPHOCYTES # BLD AUTO: 3 % — LOW (ref 13–44)
MAGNESIUM SERPL-MCNC: 2.5 MG/DL — SIGNIFICANT CHANGE UP (ref 1.6–2.6)
MCHC RBC-ENTMCNC: 32.8 PG — SIGNIFICANT CHANGE UP (ref 27–34)
MCHC RBC-ENTMCNC: 33.7 PG — SIGNIFICANT CHANGE UP (ref 27–34)
MCHC RBC-ENTMCNC: 34.4 GM/DL — SIGNIFICANT CHANGE UP (ref 32–36)
MCHC RBC-ENTMCNC: 35.3 GM/DL — SIGNIFICANT CHANGE UP (ref 32–36)
MCV RBC AUTO: 95.3 FL — SIGNIFICANT CHANGE UP (ref 80–100)
MCV RBC AUTO: 95.6 FL — SIGNIFICANT CHANGE UP (ref 80–100)
MONOCYTES # BLD AUTO: 0.6 K/UL — SIGNIFICANT CHANGE UP (ref 0–0.9)
MONOCYTES NFR BLD AUTO: 4.4 % — SIGNIFICANT CHANGE UP (ref 2–14)
NEUTROPHILS # BLD AUTO: 13.2 K/UL — HIGH (ref 1.8–7.4)
NEUTROPHILS NFR BLD AUTO: 92.4 % — HIGH (ref 43–77)
PHOSPHATE SERPL-MCNC: 8.1 MG/DL — HIGH (ref 2.5–4.5)
PLATELET # BLD AUTO: 104 K/UL — LOW (ref 150–400)
PLATELET # BLD AUTO: 107 K/UL — LOW (ref 150–400)
POTASSIUM SERPL-MCNC: 5.1 MMOL/L — SIGNIFICANT CHANGE UP (ref 3.5–5.3)
POTASSIUM SERPL-SCNC: 5.1 MMOL/L — SIGNIFICANT CHANGE UP (ref 3.5–5.3)
PROT SERPL-MCNC: 6 G/DL — SIGNIFICANT CHANGE UP (ref 6–8.3)
PROTHROM AB SERPL-ACNC: 33.3 SEC — HIGH (ref 9.8–12.7)
RBC # BLD: 2.03 M/UL — LOW (ref 4.2–5.8)
RBC # BLD: 2.16 M/UL — LOW (ref 4.2–5.8)
RBC # FLD: 12.2 % — SIGNIFICANT CHANGE UP (ref 10.3–14.5)
RBC # FLD: 12.3 % — SIGNIFICANT CHANGE UP (ref 10.3–14.5)
RH IG SCN BLD-IMP: POSITIVE — SIGNIFICANT CHANGE UP
SODIUM SERPL-SCNC: 132 MMOL/L — LOW (ref 135–145)
VANCOMYCIN TROUGH SERPL-MCNC: 16.5 UG/ML — SIGNIFICANT CHANGE UP (ref 10–20)
WBC # BLD: 13.2 K/UL — HIGH (ref 3.8–10.5)
WBC # BLD: 14.3 K/UL — HIGH (ref 3.8–10.5)
WBC # FLD AUTO: 13.2 K/UL — HIGH (ref 3.8–10.5)
WBC # FLD AUTO: 14.3 K/UL — HIGH (ref 3.8–10.5)

## 2018-02-11 PROCEDURE — 71045 X-RAY EXAM CHEST 1 VIEW: CPT | Mod: 26,77

## 2018-02-11 PROCEDURE — 99233 SBSQ HOSP IP/OBS HIGH 50: CPT | Mod: GC

## 2018-02-11 PROCEDURE — 99222 1ST HOSP IP/OBS MODERATE 55: CPT | Mod: GC

## 2018-02-11 PROCEDURE — 93010 ELECTROCARDIOGRAM REPORT: CPT

## 2018-02-11 PROCEDURE — 71045 X-RAY EXAM CHEST 1 VIEW: CPT | Mod: 26

## 2018-02-11 RX ORDER — SEVELAMER CARBONATE 2400 MG/1
1600 POWDER, FOR SUSPENSION ORAL
Qty: 0 | Refills: 0 | Status: DISCONTINUED | OUTPATIENT
Start: 2018-02-11 | End: 2018-02-24

## 2018-02-11 RX ORDER — OXYCODONE AND ACETAMINOPHEN 5; 325 MG/1; MG/1
1 TABLET ORAL ONCE
Qty: 0 | Refills: 0 | Status: DISCONTINUED | OUTPATIENT
Start: 2018-02-11 | End: 2018-02-11

## 2018-02-11 RX ORDER — VANCOMYCIN HCL 1 G
1000 VIAL (EA) INTRAVENOUS ONCE
Qty: 0 | Refills: 0 | Status: COMPLETED | OUTPATIENT
Start: 2018-02-11 | End: 2018-02-11

## 2018-02-11 RX ADMIN — Medication 50 MILLIGRAM(S): at 17:49

## 2018-02-11 RX ADMIN — Medication 250 MILLIGRAM(S): at 05:34

## 2018-02-11 RX ADMIN — SEVELAMER CARBONATE 1600 MILLIGRAM(S): 2400 POWDER, FOR SUSPENSION ORAL at 14:27

## 2018-02-11 RX ADMIN — OXYCODONE AND ACETAMINOPHEN 1 TABLET(S): 5; 325 TABLET ORAL at 02:30

## 2018-02-11 RX ADMIN — ISOSORBIDE DINITRATE 10 MILLIGRAM(S): 5 TABLET ORAL at 05:34

## 2018-02-11 RX ADMIN — Medication 100 MILLIGRAM(S): at 14:26

## 2018-02-11 RX ADMIN — Medication 100 MILLIGRAM(S): at 15:18

## 2018-02-11 RX ADMIN — ISOSORBIDE DINITRATE 10 MILLIGRAM(S): 5 TABLET ORAL at 14:26

## 2018-02-11 RX ADMIN — Medication 100 MILLIGRAM(S): at 05:34

## 2018-02-11 RX ADMIN — SEVELAMER CARBONATE 1600 MILLIGRAM(S): 2400 POWDER, FOR SUSPENSION ORAL at 17:49

## 2018-02-11 RX ADMIN — ARGATROBAN 12.24 MICROGRAM(S)/KG/MIN: 50 INJECTION, SOLUTION INTRAVENOUS at 00:04

## 2018-02-11 RX ADMIN — Medication 50 MILLIGRAM(S): at 06:43

## 2018-02-11 RX ADMIN — Medication 81 MILLIGRAM(S): at 14:26

## 2018-02-11 RX ADMIN — ATORVASTATIN CALCIUM 80 MILLIGRAM(S): 80 TABLET, FILM COATED ORAL at 23:35

## 2018-02-11 RX ADMIN — Medication 50 MILLIGRAM(S): at 14:26

## 2018-02-11 RX ADMIN — OXYCODONE AND ACETAMINOPHEN 1 TABLET(S): 5; 325 TABLET ORAL at 03:17

## 2018-02-11 RX ADMIN — Medication 10 MILLIGRAM(S): at 18:17

## 2018-02-11 NOTE — CHART NOTE - NSCHARTNOTEFT_GEN_A_CORE
Vascular Surgery    Right internal jugular Shiley, placed on 2/4/18, exchanged over a wire as has not been working during dialysis, with reportedly much oozing from the site. Patient placed in Trendelenberg, skin prepped with 2 chlorhexidine sticks and patient draped, CVC exchanged over wire with no issues, patient was anxious during procedure but hemodynamically remained stable and oxygen saturation remained in high 90's. At end of procedure, both lumens luis back blood easily and flushed easily.  Please acquire a chest xray to confirm positioning.    Rober, PGY3

## 2018-02-11 NOTE — CHART NOTE - NSCHARTNOTEFT_GEN_A_CORE
====================  CCU MIDNIGHT ROUNDS  ====================    DEUCE BALL  [id number]    ====================  SUMMARY:  ====================    78 yo M AAA (5.5 cm s/p EVAAR 1/29), R kidney neoplasm (incidental finding), prior L kidney neoplasm (L nephrectomy 2008), bladder Ca, known LBBB, HTN, HLD, transferred from [place] w/ SOB, found to have ADHF, multifocal PNA, AKA on CKD req CVV (stopped 1200 2/6), now on HD, w/ LHC 2/8 w/ TVD, found 99% stenosis R renal artery. C/b rapid a fib. 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 SOB, req high flow, planning for HD tonight     ====================  VITALS (Last 12 hrs):  ====================    T(C): 37.1 (02-11-18 @ 20:00), Max: 37.1 (02-11-18 @ 20:00)  HR: 96 (02-11-18 @ 21:00) (96 - 102)  ABP: 116/48 (02-11-18 @ 21:00) (106/50 - 154/76)  ABP(mean): 70 (02-11-18 @ 21:00) (70 - 104)  RR: 36 (02-11-18 @ 21:00) (16 - 37)  SpO2: 96% (02-11-18 @ 21:00) (91% - 100%)    TELEMETRY: sinus     I&O's Summary    10 Feb 2018 07:01  -  11 Feb 2018 07:00  --------------------------------------------------------  IN: 1421.8 mL / OUT: 1100 mL / NET: 321.8 mL    11 Feb 2018 07:01  -  11 Feb 2018 22:02  --------------------------------------------------------  IN: 473 mL / OUT: 0 mL / NET: 473 mL    ====================  PLAN:  ====================  - ABX for PNA, check vanco after HD   - fluid removal per HD, wean high flow as tolerated  - asa, lipitor, hydralazine and isordil for afterload reduction,   - argatroban for a fib ([id number] 5), f/u plts   - 1 un PRBC during HD, repeat H&H, FOBT, serial CBC     Brianda ZUNIGACNP/CCU  #[id number] ====================  CCU MIDNIGHT ROUNDS  ====================    DEUCE BALL  [id number]    ====================  SUMMARY:  ====================    76 yo M AAA (5.5 cm s/p EVAAR 1/29), R kidney neoplasm (incidental finding), prior L kidney neoplasm (L nephrectomy 2008), bladder Ca, known LBBB, HTN, HLD, transferred from [place] w/ SOB, found to have ADHF, multifocal PNA, AKA on CKD req CVV (stopped 1200 2/6), now on HD, w/ LHC 2/8 w/ TVD, found 99% stenosis R renal artery. 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 SOB, req high flow, planning for HD tonight     ====================  VITALS (Last 12 hrs):  ====================    T(C): 37.1 (02-11-18 @ 20:00), Max: 37.1 (02-11-18 @ 20:00)  HR: 96 (02-11-18 @ 21:00) (96 - 102)  ABP: 116/48 (02-11-18 @ 21:00) (106/50 - 154/76)  ABP(mean): 70 (02-11-18 @ 21:00) (70 - 104)  RR: 36 (02-11-18 @ 21:00) (16 - 37)  SpO2: 96% (02-11-18 @ 21:00) (91% - 100%)    TELEMETRY: sinus     I&O's Summary    10 Feb 2018 07:01  -  11 Feb 2018 07:00  --------------------------------------------------------  IN: 1421.8 mL / OUT: 1100 mL / NET: 321.8 mL    11 Feb 2018 07:01  -  11 Feb 2018 22:02  --------------------------------------------------------  IN: 473 mL / OUT: 0 mL / NET: 473 mL    ====================  PLAN:  ====================  - ABX for PNA, check vanco after HD   - fluid removal per HD, wean high flow as tolerated  - asa, lipitor, hydralazine and isordil for afterload reduction,   - argatroban for a fib ([id number] 5), f/u plts   - 1 un PRBC during HD, repeat H&H, FOBT, serial CBC     Brianda Shultz [facility]/CCU  #[id number]

## 2018-02-11 NOTE — PROGRESS NOTE ADULT - PROBLEM SELECTOR PLAN 3
Patient noted to have rales on examination today. Patient to receive blood transfusion today. Will arrange for HD/UF today.

## 2018-02-11 NOTE — PROGRESS NOTE ADULT - SUBJECTIVE AND OBJECTIVE BOX
[facility] Division of Kidney Diseases & Hypertension  FOLLOW UP NOTE  [phone]--------------------------------------------------------------------------------    HPI: 77 year old man with past medical history of AAA 5.5cm s/p EVAAR repair on 1/29 with incidental finding of Right Neoplasm, previous history of Left Neoplasm with Left Nephrectomy in 2009 transferred from [facility] for worsening SOB. Patient was found to have DONNIE and fluid overload and was initiated on CVVHDF on 2/4/17 and was stopped on 2/6/18.  Had rapid afib with HD.  Ultrasound suggestive of high grade right renal artery narrowing.  Patient had CT angiogram yesterday which showed moderate to severe right renal artery stenosis. Patient had HD yesterday with 500 of UF yesterday. Currently patient is on high flow nasal cannula; denies complains of CP, abdominal pain, nausea, vomiting.     PAST HISTORY  --------------------------------------------------------------------------------  No significant changes to PMH, PSH, FHx, SHx, unless otherwise noted    ALLERGIES & MEDICATIONS  --------------------------------------------------------------------------------  Allergies    Cipro (Other)  Levaquin (Other)  penicillin (Hives)    Intolerances      Standing Inpatient Medications  argatroban Infusion 3 MICROgram(s)/kG/Min IV Continuous <Continuous>  aspirin enteric coated 81 milliGRAM(s) Oral daily  atorvastatin 80 milliGRAM(s) Oral at bedtime  aztreonam  IVPB 500 milliGRAM(s) IV Intermittent every 12 hours  docusate sodium 100 milliGRAM(s) Oral daily  hydrALAZINE 100 milliGRAM(s) Oral every 8 hours  isosorbide   dinitrate Tablet (ISORDIL) 10 milliGRAM(s) Oral three times a day  metoprolol succinate ER 50 milliGRAM(s) Oral daily  senna 1 Tablet(s) Oral at bedtime    PRN Inpatient Medications  diazepam    Tablet 10 milliGRAM(s) Oral two times a day PRN  polyethylene glycol 3350 17 Gram(s) Oral daily PRN      REVIEW OF SYSTEMS  --------------------------------------------------------------------------------  Gen: No  fevers/chills  Head/Eyes/Ears/Mouth: No headache;  Respiratory: No dyspnea,  CV: No chest pain  GI: No abdominal pain,nausea, vomiting  MSK: no edema  Neuro: No dizziness/lightheadedness, weakness, seizures, numbness, tingling      All other systems were reviewed and are negative, except as noted.    VITALS/PHYSICAL EXAM  --------------------------------------------------------------------------------  T(C): 36.7 (02-10-18 @ 20:00), Max: 37.2 (02-10-18 @ 09:00)  HR: 98 (02-11-18 @ 08:07) (94 - 134)  BP: --  RR: 37 (02-11-18 @ 08:07) (20 - 52)  SpO2: 98% (02-11-18 @ 08:07) (90% - 99%)  Wt(kg): --        02-10-18 @ 07:01  -  02-11-18 @ 07:00  --------------------------------------------------------  IN: 1421.8 mL / OUT: 1100 mL / NET: 321.8 mL      Physical Exam:  	 Gen: NAD   	HEENT: anicteric  	Pulm: crackles present over lung bases   	CV: RRR  	Abd: soft, nontender, nondistended  	LE: no B/L pedal edema present.   	Skin: Warm and dry   	Vascular access: right non tunneled HD catheter present.       LABS/STUDIES  --------------------------------------------------------------------------------              6.8    13.2  >-----------<  107      [02-11-18 @ 06:50]              19.4     132  |  91  |  71  ----------------------------<  150      [02-11-18 @ 05:54]  5.1   |  22  |  7.27        Ca     8.1     [02-11-18 @ 05:54]      Mg     2.5     [02-11-18 @ 05:54]      Phos  8.1     [02-11-18 @ 05:54]    TPro  6.0  /  Alb  2.9  /  TBili  0.4  /  DBili  x   /  AST  40  /  ALT  30  /  AlkPhos  60  [02-11-18 @ 05:54]    PT/INR: PT 33.3 , INR 2.99       [02-11-18 @ 05:54]  PTT: 68.6       [02-11-18 @ 05:54]      Creatinine Trend:  SCr 7.27 [02-11 @ 05:54]  SCr 8.95 [02-10 @ 05:35]  SCr 8.08 [02-09 @ 16:42]  SCr 6.61 [02-09 @ 03:18]  SCr 5.92 [02-08 @ 03:38]    Urinalysis - [02-04-18 @ 13:17]      Color Yellow / Appearance Clear / SG 1.020 / pH 5.0      Gluc 50 / Ketone Negative  / Bili Negative / Urobili Negative       Blood Trace / Protein 30 / Leuk Est Trace / Nitrite Negative      RBC 0-2 / WBC 0-2 / Hyaline  / Gran  / Sq Epi  / Non Sq Epi Few / Bacteria Trace      HbA1c 5.4      [02-04-18 @ 21:37]  TSH 3.08      [02-04-18 @ 21:37]  Lipid: chol 145, , HDL 26, LDL 87      [02-04-18 @ 21:37]    HBsAb <3.0      [02-07-18 @ 23:02]  HBsAg Nonreact      [02-07-18 @ 23:02]  HCV 0.11, Nonreact      [02-07-18 @ 23:02]    C3 Complement 157      [02-05-18 @ 21:15]  C4 Complement 35      [02-05-18 @ 21:15]

## 2018-02-11 NOTE — CONSULT NOTE ADULT - ASSESSMENT
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

## 2018-02-11 NOTE — CONSULT NOTE ADULT - PROBLEM SELECTOR RECOMMENDATION 9
Pretest probability was high with platelet count >50% 5-10 days after starting heparin with no other apparent cause, after HIT Ab test positive likelihood of HIT is ~93%   -agree with Argatroban drip, continue to trend LFTs every few days   -may start bridging to warfarin when platelet count > or = 150,000   -bridge should be over at least 5 days, would start warfarin at 5mg daily   -Would follow [facility] pharmacy guidelines for the bridging of Warfarin on Argatroban and when to discontinue the Argatroban   -goal INR when on warfarin alone is 2-3, patient will need outpatient hematology follow up at [facility] [phone] and at least 4 weeks of anticoagulation    Jose Maxwell  PGY-5, Hematology-Oncology Fellow  [phone] ([facility]) [phone] ([facility])

## 2018-02-11 NOTE — CONSULT NOTE ADULT - SUBJECTIVE AND OBJECTIVE BOX
HPI:  77M history of AAA 5.5cm s/p EVAAR repair with stents on  with incidental finding of Right Renal Neoplasm, previous history of Left Renal Neoplasm with Left Nephrectomy in , Bladder Cancer, Known LBBB, HTN, HLD transferred from [facility] with acute respiratory failure requiring BiPAP and admitted with afib with RVR and acute HFpEF requiring CCU stay.    Patient was on heparin drip but noted to have platelet drop on  for which HIT panel was sent and anticoagulation changed to Argatroban, HIT Ab came back positive today and hematology consulted for recommendation.     Allergies  Cipro (Other)  Levaquin (Other)  penicillin (Hives)    MEDICATIONS  (STANDING):  argatroban Infusion 3 MICROgram(s)/kG/Min (12.24 mL/Hr) IV Continuous <Continuous>  aspirin enteric coated 81 milliGRAM(s) Oral daily  atorvastatin 80 milliGRAM(s) Oral at bedtime  aztreonam  IVPB 500 milliGRAM(s) IV Intermittent every 12 hours  docusate sodium 100 milliGRAM(s) Oral daily  hydrALAZINE 100 milliGRAM(s) Oral every 8 hours  isosorbide   dinitrate Tablet (ISORDIL) 10 milliGRAM(s) Oral three times a day  metoprolol succinate ER 50 milliGRAM(s) Oral daily  senna 1 Tablet(s) Oral at bedtime  sevelamer hydrochloride 1600 milliGRAM(s) Oral three times a day with meals    MEDICATIONS  (PRN):  diazepam    Tablet 10 milliGRAM(s) Oral two times a day PRN anxiety  polyethylene glycol 3350 17 Gram(s) Oral daily PRN Constipation    PAST MEDICAL & SURGICAL HISTORY:  Renal mass, right: current - following with Dr Santiago  BPH (benign prostatic hyperplasia)  Abdominal aortic aneurysm (AAA): 5.5 cm  Heel spur, left  Vertigo  Left bundle branch block  Hx antineoplastic chemotherapy (BCG )  Lt Renal Neoplasm: left - s/p left nephrectomy  Hyperlipemia (ICD9 272.4): dx   Bladder Cancer (ICD9 188.9): TCC, dx   HTN: dx   H/O abdominal aortic aneurysm repair  History of cystoscopy: 2015  History of Lt  Nephrectomy: 6/10 - left  S/P Tonsillectomy  S/P Cystoscopy: bladder polyps removal multiple times (since ), 6/10 , 10/2011 &amp; 10/17/2011, , 2012, last 13, 2013, 2014  urethral Stent 2008: stent placement and removal    FAMILY HISTORY:  Family history of MI (myocardial infarction) (Father, Mother): father  74y/o MI, mother  96y/o alzheimers    SOCIAL HISTORY: No EtOH, no tobacco    REVIEW OF SYSTEMS:  10 point ROS otherwise negative, patient is lethargic and deferred most of history to his daughter sitting at bedside.    T(F): 97.7 (18 @ 12:00), Max: 98.5 (02-10-18 @ 16:45)  HR: 102 (18 @ 15:00)  BP: --  RR: 16 (18 @ 15:00)  SpO2: 96% (18 @ 15:00)  Wt(kg): --    GENERAL: NAD, well-developed  HEAD:  Atraumatic, Normocephalic  EYES: EOMI, PERRLA, conjunctiva and sclera clear  NECK: Supple, No JVD, + shiley + LIJ access   CHEST/LUNG: Clear to auscultation bilaterally; No wheeze  HEART: Tachycardic; + murmur  ABDOMEN: Soft, Nontender, Nondistended; Bowel sounds present  EXTREMITIES:  2+ Peripheral Pulses, 2+ pitting edema bilaterally   NEUROLOGY: non-focal                      6.8    13.2  )-----------( 107      ( 2018 06:50 )             19.4           132<L>  |  91<L>  |  71<H>  ----------------------------<  150<H>  5.1   |  22  |  7.27<H>    Ca    8.1<L>      2018 05:54  Phos  8.1       Mg     2.5         TPro  6.0  /  Alb  2.9<L>  /  TBili  0.4  /  DBili  x   /  AST  40  /  ALT  30  /  AlkPhos  60      Magnesium, Serum: 2.5 mg/dL ( @ 05:54)  Phosphorus Level, Serum: 8.1 mg/dL ( @ 05:54)    EXAM:  CT ANGIO ABD PELV (W)AW IC                          PROCEDURE DATE:  02/10/2018      INTERPRETATION:  CLINICAL INFORMATION: Renal artery stenosis reported on   ultrasound. Requested localization of right renal artery origin. History   of RCC, left nephrectomy, EVAR.    COMPARISON: CT abdomen pelvis dated 2017.    FINDINGS:    LOWER CHEST: Small bilateral pleural effusions with consolidation most   prominent in the left lower lobe, compatible with pneumonia.    LIVER: Within normal limits.  BILE DUCTS: Normal caliber.  GALLBLADDER: Vicarious excretion of contrast.  SPLEEN: Small indeterminate hypodense lesion.  PANCREAS: Within normal limits.  ADRENALS: Within normal limits.  KIDNEYS/URETERS: 1.8 cm enhancing mass in the right lower pole is   unchanged since 2017. Left nephrectomy.    BLADDER: Underdistended.  REPRODUCTIVE ORGANS: Nonenlarged prostate.    BOWEL: No bowel obstruction. Colonic diverticulosis. Nonvisualized   appendix.   PERITONEUM: Presacral edema. No ascites.  VESSELS:  Infrarenal abdominal aortic aneurysm status post aortobiiliac   stent graft extending from the level of the SMA. Maximum aortic sac size   is 5.3 cm, unchanged. There is moderate stenosis of the SMA at its   origin. Although difficult to visualize due to stent artifact, there is   moderate to severe stenosis of the right renal artery at its origin.   Severe stenosis of the celiac artery at its origin is also noted with   poststenotic dilatation.  RETROPERITONEUM: No lymphadenopathy.  Nonenhancing left groin hematoma   measuring up to approximately 4 cm. Postprocedural changes in both   inguinal regions.  ABDOMINAL WALL: Focal left anterior abdominal wall hernia containing   segment of nonobstructed colonic wall.  BONES: Within normal limits.    IMPRESSION:     Infrarenal AAA status post NIALL. The stent extends above the right renal   artery, from the level of the SMA. Although slightly difficult to   visualize due to streak artifact, there is likely moderate severe right   renal artery stenosis at its origin. Moderate SMA stenosis and severe   celiac stenosis also noted, as above.    Small bilateral pleural effusions with left lower lobe and possibly right   lower lobe pneumonia.    Redemonstration of 1.8 cm enhancing right lower pole solid renal mass.     SHAYAN HOUSTON M.D., RADIOLOGY RESIDENT  This document has been electronically signed.  MARY ZUNIGA M.D., ATTENDING RADIOLOGIST  This document has been electronically signed. Feb 10 2018  6:32PM

## 2018-02-11 NOTE — PROGRESS NOTE ADULT - SUBJECTIVE AND OBJECTIVE BOX
Patient is a 77y old  Male who presents with a chief complaint of Shortness of Breath (04 Feb 2018 17:51)    Overnight events/Subjective:  This AM Hg 6.8 <--7.1 <-- 8.3. Started on 1u pRBC. Received RIJ catheter yesterday.  	  HPI:  This is a 77 year old man with past medical history of AAA 5.5cm s/p EVAAR repair with stents on 1/29 with incidental finding of Right Renal Neoplasm, previous history of Left Renal Neoplasm with Left Nephrectomy in 2008, Bladder Cancer, Known LBBB, HTN, HLD transferred from [facility] after initially presenting with SOB which began last night. Patient states that he was discharged from the hospital following AAA repair and recovering well.  He was walking with walker at home with no complaints.  After going to bed, he was awoken from his sleep complaining of SOB with no relief.  He went to [facility] wtih his SOB getting progressively worse with 1 episode of vomitus before going to hospital.  Patient denies fever, chills, recent sick contact, Chest pain, Abdominal pain, diarrhea.  At [place], paient with elevate d-dimers and transferred for possible PE.  At [facility], patient with  RICKI in V2-V4 with elevated Trops, and ADHF requiring Bipap. (04 Feb 2018 17:51)      MEDICATIONS  (STANDING):  argatroban Infusion 3 MICROgram(s)/kG/Min (12.24 mL/Hr) IV Continuous <Continuous>  aspirin enteric coated 81 milliGRAM(s) Oral daily  atorvastatin 80 milliGRAM(s) Oral at bedtime  aztreonam  IVPB 500 milliGRAM(s) IV Intermittent every 12 hours  docusate sodium 100 milliGRAM(s) Oral daily  hydrALAZINE 100 milliGRAM(s) Oral every 8 hours  isosorbide   dinitrate Tablet (ISORDIL) 10 milliGRAM(s) Oral three times a day  metoprolol succinate ER 50 milliGRAM(s) Oral daily  senna 1 Tablet(s) Oral at bedtime    MEDICATIONS  (PRN):  diazepam    Tablet 10 milliGRAM(s) Oral two times a day PRN anxiety  polyethylene glycol 3350 17 Gram(s) Oral daily PRN Constipation      REVIEW OF SYSTEMS:  Otherwise, 10 point ROS done and otherwise negative.    PHYSICAL EXAM:  Vital Signs Last 24 Hrs  T(C): 36.7 (10 Feb 2018 20:00), Max: 37.2 (10 Feb 2018 09:00)  T(F): 98.1 (10 Feb 2018 20:00), Max: 99 (10 Feb 2018 09:00)  HR: 98 (11 Feb 2018 07:07) (90 - 134)  BP: --  BP(mean): --  RR: 37 (11 Feb 2018 07:07) (20 - 52)  SpO2: 95% (11 Feb 2018 07:07) (90% - 99%)  I&O's Summary    10 Feb 2018 07:01  -  11 Feb 2018 07:00  --------------------------------------------------------  IN: 1421.8 mL / OUT: 1100 mL / NET: 321.8 mL        Appearance: Normal	  HEENT:   Normal oral mucosa, PERRL, EOMI	  Lymphatic: No lymphadenopathy  Cardiovascular: Normal S1 S2, No JVD, No murmurs, No edema  Respiratory: Lungs clear to auscultation	  Psychiatry: A & O x 3, Mood & affect appropriate  Gastrointestinal:  Soft, Non-tender, + BS	  Skin: No rashes, No ecchymoses, No cyanosis	  Neurologic: Non-focal  Extremities: Normal range of motion, No clubbing, cyanosis or edema  Vascular: Peripheral pulses palpable 2+ bilaterally    LABS:	 	                        6.8    13.2  )-----------( 107      ( 11 Feb 2018 06:50 )             19.4     Auto Eosinophil # x     / Auto Eosinophil % x     / Auto Neutrophil # x     / Auto Neutrophil % x     / BANDS % x                            7.1    14.3  )-----------( 104      ( 11 Feb 2018 05:54 )             20.5     Auto Eosinophil # 0.0   / Auto Eosinophil % 0.2   / Auto Neutrophil # 13.2  / Auto Neutrophil % 92.4  / BANDS % x                            8.3    16.8  )-----------( 108      ( 10 Feb 2018 05:35 )             23.4     Auto Eosinophil # x     / Auto Eosinophil % x     / Auto Neutrophil # x     / Auto Neutrophil % x     / BANDS % x        INR: 2.99 ratio (02-11 @ 05:54)  INR: 2.56 ratio (02-10 @ 23:25)  INR: 2.77 ratio (02-10 @ 05:35)    02-11    132<L>  |  91<L>  |  71<H>  ----------------------------<  150<H>  5.1   |  22  |  7.27<H>  02-10    132<L>  |  91<L>  |  85<H>  ----------------------------<  120<H>  5.4<H>   |  16<L>  |  8.95<H>  02-09    134<L>  |  94<L>  |  77<H>  ----------------------------<  108<H>  4.8   |  19<L>  |  8.08<H>    Ca    8.1<L>      11 Feb 2018 05:54  Mg     2.5     02-11  Phos  8.1     02-11  TPro  6.0  /  Alb  2.9<L>  /  TBili  0.4  /  DBili  x   /  AST  40  /  ALT  30  /  AlkPhos  60  02-11  TPro  6.6  /  Alb  2.9<L>  /  TBili  0.4  /  DBili  x   /  AST  35  /  ALT  25  /  AlkPhos  60  02-10  TPro  6.8  /  Alb  3.1<L>  /  TBili  0.5  /  DBili  x   /  AST  33  /  ALT  25  /  AlkPhos  60  02-09    Lactate, Blood: 0.9 mmol/L (02-11 @ 05:54)  Lactate, Blood: 0.7 mmol/L (02-10 @ 05:35)      proBNP:   Lipid Profile: 02-04 Chol 145 LDL 87 HDL 26<L> Trig 162<H>  HgA1c: 5.4 % (02-04 @ 21:37)    TSH:     CARDIAC MARKERS:   05 Feb 2018 05:05 Troponin 0.73 ng/mL / Creatine Kinase 156 U/L /  CKMB 14.0 ng/mL / CPK Mass Assay % 9.0 %   05 Feb 2018 01:17 Troponin 0.80 ng/mL / Creatine Kinase 158 U/L /  CKMB 15.2 ng/mL / CPK Mass Assay % 9.6 %   04 Feb 2018 16:51 Troponin 0.72 ng/mL / Creatine Kinase 151 U/L /  CKMB x     / CPK Mass Assay % x            TELEMETRY: 	    ECG:  	  RADIOLOGY:  OTHER: 	    PREVIOUS DIAGNOSTIC TESTING:    [ ] Echocardiogram:  [ ]  Catheterization:  [ ] Stress Test: Patient is a 77y old  Male who presents with a chief complaint of Shortness of Breath (04 Feb 2018 17:51)    Overnight events/Subjective:  This AM Hg 7.1, 6.8 on repeat from 8.3 yesterday. Received RIJ catheter yesterday. To 1u pRBC today with HD. Received HD yesterday  	  HPI:  This is a 77 year old man with past medical history of AAA 5.5cm s/p EVAAR repair with stents on 1/29 with incidental finding of Right Renal Neoplasm, previous history of Left Renal Neoplasm with Left Nephrectomy in 2008, Bladder Cancer, Known LBBB, HTN, HLD transferred from [facility] after initially presenting with SOB which began last night. Patient states that he was discharged from the hospital following AAA repair and recovering well.  He was walking with walker at home with no complaints.  After going to bed, he was awoken from his sleep complaining of SOB with no relief.  He went to [facility] wtih his SOB getting progressively worse with 1 episode of vomitus before going to hospital.  Patient denies fever, chills, recent sick contact, Chest pain, Abdominal pain, diarrhea.  At [place], paient with elevate d-dimers and transferred for possible PE.  At [facility], patient with  RICKI in V2-V4 with elevated Trops, and ADHF requiring Bipap. (04 Feb 2018 17:51)      MEDICATIONS  (STANDING):  argatroban Infusion 3 MICROgram(s)/kG/Min (12.24 mL/Hr) IV Continuous <Continuous>  aspirin enteric coated 81 milliGRAM(s) Oral daily  atorvastatin 80 milliGRAM(s) Oral at bedtime  aztreonam  IVPB 500 milliGRAM(s) IV Intermittent every 12 hours  docusate sodium 100 milliGRAM(s) Oral daily  hydrALAZINE 100 milliGRAM(s) Oral every 8 hours  isosorbide   dinitrate Tablet (ISORDIL) 10 milliGRAM(s) Oral three times a day  metoprolol succinate ER 50 milliGRAM(s) Oral daily  senna 1 Tablet(s) Oral at bedtime    MEDICATIONS  (PRN):  diazepam    Tablet 10 milliGRAM(s) Oral two times a day PRN anxiety  polyethylene glycol 3350 17 Gram(s) Oral daily PRN Constipation      REVIEW OF SYSTEMS:  Otherwise, 10 point ROS done and otherwise negative.    PHYSICAL EXAM:  Vital Signs Last 24 Hrs  T(C): 36.7 (10 Feb 2018 20:00), Max: 37.2 (10 Feb 2018 09:00)  T(F): 98.1 (10 Feb 2018 20:00), Max: 99 (10 Feb 2018 09:00)  HR: 98 (11 Feb 2018 07:07) (90 - 134)  BP: --  BP(mean): --  RR: 37 (11 Feb 2018 07:07) (20 - 52)  SpO2: 95% (11 Feb 2018 07:07) (90% - 99%)  I&O's Summary    10 Feb 2018 07:01  -  11 Feb 2018 07:00  --------------------------------------------------------  IN: 1421.8 mL / OUT: 1100 mL / NET: 321.8 mL        Appearance: Normal	  HEENT:   Normal oral mucosa, PERRL, EOMI	  Lymphatic: No lymphadenopathy  Cardiovascular: Normal S1 S2, No JVD, No murmurs, No edema  Respiratory: Lungs clear to auscultation	  Psychiatry: A & O x 3, Mood & affect appropriate  Gastrointestinal:  Soft, Non-tender, + BS	  Skin: No rashes, No ecchymoses, No cyanosis	  Neurologic: Non-focal  Extremities: Normal range of motion, No clubbing, cyanosis or edema  Vascular: Peripheral pulses palpable 2+ bilaterally    LABS:	 	                        6.8    13.2  )-----------( 107      ( 11 Feb 2018 06:50 )             19.4     Auto Eosinophil # x     / Auto Eosinophil % x     / Auto Neutrophil # x     / Auto Neutrophil % x     / BANDS % x                            7.1    14.3  )-----------( 104      ( 11 Feb 2018 05:54 )             20.5     Auto Eosinophil # 0.0   / Auto Eosinophil % 0.2   / Auto Neutrophil # 13.2  / Auto Neutrophil % 92.4  / BANDS % x                            8.3    16.8  )-----------( 108      ( 10 Feb 2018 05:35 )             23.4     Auto Eosinophil # x     / Auto Eosinophil % x     / Auto Neutrophil # x     / Auto Neutrophil % x     / BANDS % x        INR: 2.99 ratio (02-11 @ 05:54)  INR: 2.56 ratio (02-10 @ 23:25)  INR: 2.77 ratio (02-10 @ 05:35)    02-11    132<L>  |  91<L>  |  71<H>  ----------------------------<  150<H>  5.1   |  22  |  7.27<H>  02-10    132<L>  |  91<L>  |  85<H>  ----------------------------<  120<H>  5.4<H>   |  16<L>  |  8.95<H>  02-09    134<L>  |  94<L>  |  77<H>  ----------------------------<  108<H>  4.8   |  19<L>  |  8.08<H>    Ca    8.1<L>      11 Feb 2018 05:54  Mg     2.5     02-11  Phos  8.1     02-11  TPro  6.0  /  Alb  2.9<L>  /  TBili  0.4  /  DBili  x   /  AST  40  /  ALT  30  /  AlkPhos  60  02-11  TPro  6.6  /  Alb  2.9<L>  /  TBili  0.4  /  DBili  x   /  AST  35  /  ALT  25  /  AlkPhos  60  02-10  TPro  6.8  /  Alb  3.1<L>  /  TBili  0.5  /  DBili  x   /  AST  33  /  ALT  25  /  AlkPhos  60  02-09    Lactate, Blood: 0.9 mmol/L (02-11 @ 05:54)  Lactate, Blood: 0.7 mmol/L (02-10 @ 05:35)      proBNP:   Lipid Profile: 02-04 Chol 145 LDL 87 HDL 26<L> Trig 162<H>  HgA1c: 5.4 % (02-04 @ 21:37)    TSH:     CARDIAC MARKERS:   05 Feb 2018 05:05 Troponin 0.73 ng/mL / Creatine Kinase 156 U/L /  CKMB 14.0 ng/mL / CPK Mass Assay % 9.0 %   05 Feb 2018 01:17 Troponin 0.80 ng/mL / Creatine Kinase 158 U/L /  CKMB 15.2 ng/mL / CPK Mass Assay % 9.6 %   04 Feb 2018 16:51 Troponin 0.72 ng/mL / Creatine Kinase 151 U/L /  CKMB x     / CPK Mass Assay % x            TELEMETRY: 	    ECG:  	  RADIOLOGY:  OTHER: 	    PREVIOUS DIAGNOSTIC TESTING:    [ ] Echocardiogram:  [ ]  Catheterization:  [ ] Stress Test: Patient is a 77y old  Male who presents with a chief complaint of Shortness of Breath (04 Feb 2018 17:51)    Overnight events/Subjective:  This AM Hg 7.1, 6.8 on repeat from 8.3 yesterday. Received RIJ catheter yesterday. To 1u pRBC today with HD. Received HD yesterday  	  HPI:  This is a 77 year old man with past medical history of AAA 5.5cm s/p EVAAR repair with stents on 1/29 with incidental finding of Right Renal Neoplasm, previous history of Left Renal Neoplasm with Left Nephrectomy in 2008, Bladder Cancer, Known LBBB, HTN, HLD transferred from [facility] after initially presenting with SOB which began last night. Patient states that he was discharged from the hospital following AAA repair and recovering well.  He was walking with walker at home with no complaints.  After going to bed, he was awoken from his sleep complaining of SOB with no relief.  He went to [facility] wtih his SOB getting progressively worse with 1 episode of vomitus before going to hospital.  Patient denies fever, chills, recent sick contact, Chest pain, Abdominal pain, diarrhea.  At [place], paient with elevate d-dimers and transferred for possible PE.  At [facility], patient with  RICKI in V2-V4 with elevated Trops, and ADHF requiring Bipap. (04 Feb 2018 17:51)      MEDICATIONS  (STANDING):  argatroban Infusion 3 MICROgram(s)/kG/Min (12.24 mL/Hr) IV Continuous <Continuous>  aspirin enteric coated 81 milliGRAM(s) Oral daily  atorvastatin 80 milliGRAM(s) Oral at bedtime  aztreonam  IVPB 500 milliGRAM(s) IV Intermittent every 12 hours  docusate sodium 100 milliGRAM(s) Oral daily  hydrALAZINE 100 milliGRAM(s) Oral every 8 hours  isosorbide   dinitrate Tablet (ISORDIL) 10 milliGRAM(s) Oral three times a day  metoprolol succinate ER 50 milliGRAM(s) Oral daily  senna 1 Tablet(s) Oral at bedtime    MEDICATIONS  (PRN):  diazepam    Tablet 10 milliGRAM(s) Oral two times a day PRN anxiety  polyethylene glycol 3350 17 Gram(s) Oral daily PRN Constipation      REVIEW OF SYSTEMS:  Otherwise, 10 point ROS done and otherwise negative.    PHYSICAL EXAM:  Vital Signs Last 24 Hrs  T(C): 36.7 (10 Feb 2018 20:00), Max: 37.2 (10 Feb 2018 09:00)  T(F): 98.1 (10 Feb 2018 20:00), Max: 99 (10 Feb 2018 09:00)  HR: 98 (11 Feb 2018 07:07) (90 - 134)  BP: --  BP(mean): --  RR: 37 (11 Feb 2018 07:07) (20 - 52)  SpO2: 95% (11 Feb 2018 07:07) (90% - 99%)  I&O's Summary    10 Feb 2018 07:01  -  11 Feb 2018 07:00  --------------------------------------------------------  IN: 1421.8 mL / OUT: 1100 mL / NET: 321.8 mL        Appearance: Normal	  HEENT:   Normal oral mucosa, PERRL, EOMI	  Lymphatic: No lymphadenopathy  Cardiovascular: Normal S1 S2, No JVD, No murmurs, No edema  Respiratory: Lungs clear to auscultation	  Psychiatry: A & O x 3, Mood & affect appropriate  Gastrointestinal:  Soft, Non-tender, + BS	  Skin: No rashes, No ecchymoses, No cyanosis	  Neurologic: Non-focal  Extremities: Normal range of motion, No clubbing, cyanosis or edema  Vascular: Peripheral pulses palpable 2+ bilaterally    LABS:	 	                        6.8    13.2  )-----------( 107      ( 11 Feb 2018 06:50 )             19.4     Auto Eosinophil # x     / Auto Eosinophil % x     / Auto Neutrophil # x     / Auto Neutrophil % x     / BANDS % x                            7.1    14.3  )-----------( 104      ( 11 Feb 2018 05:54 )             20.5     Auto Eosinophil # 0.0   / Auto Eosinophil % 0.2   / Auto Neutrophil # 13.2  / Auto Neutrophil % 92.4  / BANDS % x                            8.3    16.8  )-----------( 108      ( 10 Feb 2018 05:35 )             23.4     Auto Eosinophil # x     / Auto Eosinophil % x     / Auto Neutrophil # x     / Auto Neutrophil % x     / BANDS % x        INR: 2.99 ratio (02-11 @ 05:54)  INR: 2.56 ratio (02-10 @ 23:25)  INR: 2.77 ratio (02-10 @ 05:35)    02-11    132<L>  |  91<L>  |  71<H>  ----------------------------<  150<H>  5.1   |  22  |  7.27<H>  02-10    132<L>  |  91<L>  |  85<H>  ----------------------------<  120<H>  5.4<H>   |  16<L>  |  8.95<H>  02-09    134<L>  |  94<L>  |  77<H>  ----------------------------<  108<H>  4.8   |  19<L>  |  8.08<H>    Ca    8.1<L>      11 Feb 2018 05:54  Mg     2.5     02-11  Phos  8.1     02-11  TPro  6.0  /  Alb  2.9<L>  /  TBili  0.4  /  DBili  x   /  AST  40  /  ALT  30  /  AlkPhos  60  02-11  TPro  6.6  /  Alb  2.9<L>  /  TBili  0.4  /  DBili  x   /  AST  35  /  ALT  25  /  AlkPhos  60  02-10  TPro  6.8  /  Alb  3.1<L>  /  TBili  0.5  /  DBili  x   /  AST  33  /  ALT  25  /  AlkPhos  60  02-09    Lactate, Blood: 0.9 mmol/L (02-11 @ 05:54)  Lactate, Blood: 0.7 mmol/L (02-10 @ 05:35)      proBNP:   Lipid Profile: 02-04 Chol 145 LDL 87 HDL 26<L> Trig 162<H>  HgA1c: 5.4 % (02-04 @ 21:37)    TSH:     CARDIAC MARKERS:   05 Feb 2018 05:05 Troponin 0.73 ng/mL / Creatine Kinase 156 U/L /  CKMB 14.0 ng/mL / CPK Mass Assay % 9.0 %   05 Feb 2018 01:17 Troponin 0.80 ng/mL / Creatine Kinase 158 U/L /  CKMB 15.2 ng/mL / CPK Mass Assay % 9.6 %   04 Feb 2018 16:51 Troponin 0.72 ng/mL / Creatine Kinase 151 U/L /  CKMB x     / CPK Mass Assay % x            TELEMETRY: 	    ECG:  	  RADIOLOGY:  < from: CT Angio Abdomen and Pelvis w/ IV Cont (02.10.18 @ 15:21) >  IMPRESSION:     Infrarenal AAA status post NIALL. The stent extends above the right renal   artery, from the level of the SMA. Although slightly difficult to   visualize due to streak artifact, there is likely moderate severe right   renal artery stenosis at its origin. Moderate SMA stenosis and severe   celiac stenosis also noted, as above.    Small bilateral pleural effusions with left lower lobe and possibly right   lower lobe pneumonia.    Redemonstration of 1.8 cm enhancing right lower pole solid renal mass.     < end of copied text >    OTHER: 	    PREVIOUS DIAGNOSTIC TESTING:    [ ] Echocardiogram:  [ ]  Catheterization:  [ ] Stress Test: Patient is a 77y old  Male who presents with a chief complaint of Shortness of Breath (04 Feb 2018 17:51)    Overnight events/Subjective:  This AM Hg 7.1, 6.8 on repeat from 8.3 yesterday. Received RIJ catheter yesterday. Required HFNC overnight, currently FiO2 51%, 40 LPM due to volume overload. To get 1u pRBC today with HD. Received HD yesterday.  	  HPI:  This is a 77 year old man with past medical history of AAA 5.5cm s/p EVAAR repair with stents on 1/29 with incidental finding of Right Renal Neoplasm, previous history of Left Renal Neoplasm with Left Nephrectomy in 2008, Bladder Cancer, Known LBBB, HTN, HLD transferred from [facility] after initially presenting with SOB which began last night. Patient states that he was discharged from the hospital following AAA repair and recovering well.  He was walking with walker at home with no complaints.  After going to bed, he was awoken from his sleep complaining of SOB with no relief.  He went to [facility] wtih his SOB getting progressively worse with 1 episode of vomitus before going to hospital.  Patient denies fever, chills, recent sick contact, Chest pain, Abdominal pain, diarrhea.  At [place], paient with elevate d-dimers and transferred for possible PE.  At [facility], patient with  RICKI in V2-V4 with elevated Trops, and ADHF requiring Bipap. (04 Feb 2018 17:51)      MEDICATIONS  (STANDING):  argatroban Infusion 3 MICROgram(s)/kG/Min (12.24 mL/Hr) IV Continuous <Continuous>  aspirin enteric coated 81 milliGRAM(s) Oral daily  atorvastatin 80 milliGRAM(s) Oral at bedtime  aztreonam  IVPB 500 milliGRAM(s) IV Intermittent every 12 hours  docusate sodium 100 milliGRAM(s) Oral daily  hydrALAZINE 100 milliGRAM(s) Oral every 8 hours  isosorbide   dinitrate Tablet (ISORDIL) 10 milliGRAM(s) Oral three times a day  metoprolol succinate ER 50 milliGRAM(s) Oral daily  senna 1 Tablet(s) Oral at bedtime    MEDICATIONS  (PRN):  diazepam    Tablet 10 milliGRAM(s) Oral two times a day PRN anxiety  polyethylene glycol 3350 17 Gram(s) Oral daily PRN Constipation      REVIEW OF SYSTEMS:  Otherwise, 10 point ROS done and otherwise negative.    PHYSICAL EXAM:  Vital Signs Last 24 Hrs  T(C): 36.7 (10 Feb 2018 20:00), Max: 37.2 (10 Feb 2018 09:00)  T(F): 98.1 (10 Feb 2018 20:00), Max: 99 (10 Feb 2018 09:00)  HR: 98 (11 Feb 2018 07:07) (90 - 134)  BP: --  BP(mean): --  RR: 37 (11 Feb 2018 07:07) (20 - 52)  SpO2: 95% (11 Feb 2018 07:07) (90% - 99%)  I&O's Summary    10 Feb 2018 07:01  -  11 Feb 2018 07:00  --------------------------------------------------------  IN: 1421.8 mL / OUT: 1100 mL / NET: 321.8 mL        Appearance: clammy	  Lymphatic: No lymphadenopathy  Cardiovascular: Normal S1 S2, No JVD, No murmurs, No edema  Respiratory: Rales throughout, respirations labored	  Psychiatry: A & O x 3  Gastrointestinal:  Soft, Non-tender, + BS	  Skin: No rashes, No ecchymoses, No cyanosis	  Neurologic: Non-focal  Extremities: Normal range of motion, No clubbing, cyanosis or edema. Warm extremities    LABS:	 	                        6.8    13.2  )-----------( 107      ( 11 Feb 2018 06:50 )             19.4     Auto Eosinophil # x     / Auto Eosinophil % x     / Auto Neutrophil # x     / Auto Neutrophil % x     / BANDS % x                            7.1    14.3  )-----------( 104      ( 11 Feb 2018 05:54 )             20.5     Auto Eosinophil # 0.0   / Auto Eosinophil % 0.2   / Auto Neutrophil # 13.2  / Auto Neutrophil % 92.4  / BANDS % x                            8.3    16.8  )-----------( 108      ( 10 Feb 2018 05:35 )             23.4     Auto Eosinophil # x     / Auto Eosinophil % x     / Auto Neutrophil # x     / Auto Neutrophil % x     / BANDS % x        INR: 2.99 ratio (02-11 @ 05:54)  INR: 2.56 ratio (02-10 @ 23:25)  INR: 2.77 ratio (02-10 @ 05:35)    02-11    132<L>  |  91<L>  |  71<H>  ----------------------------<  150<H>  5.1   |  22  |  7.27<H>  02-10    132<L>  |  91<L>  |  85<H>  ----------------------------<  120<H>  5.4<H>   |  16<L>  |  8.95<H>  02-09    134<L>  |  94<L>  |  77<H>  ----------------------------<  108<H>  4.8   |  19<L>  |  8.08<H>    Ca    8.1<L>      11 Feb 2018 05:54  Mg     2.5     02-11  Phos  8.1     02-11  TPro  6.0  /  Alb  2.9<L>  /  TBili  0.4  /  DBili  x   /  AST  40  /  ALT  30  /  AlkPhos  60  02-11  TPro  6.6  /  Alb  2.9<L>  /  TBili  0.4  /  DBili  x   /  AST  35  /  ALT  25  /  AlkPhos  60  02-10  TPro  6.8  /  Alb  3.1<L>  /  TBili  0.5  /  DBili  x   /  AST  33  /  ALT  25  /  AlkPhos  60  02-09    Lactate, Blood: 0.9 mmol/L (02-11 @ 05:54)  Lactate, Blood: 0.7 mmol/L (02-10 @ 05:35)      proBNP:   Lipid Profile: 02-04 Chol 145 LDL 87 HDL 26<L> Trig 162<H>  HgA1c: 5.4 % (02-04 @ 21:37)    TSH:     CARDIAC MARKERS:   05 Feb 2018 05:05 Troponin 0.73 ng/mL / Creatine Kinase 156 U/L /  CKMB 14.0 ng/mL / CPK Mass Assay % 9.0 %   05 Feb 2018 01:17 Troponin 0.80 ng/mL / Creatine Kinase 158 U/L /  CKMB 15.2 ng/mL / CPK Mass Assay % 9.6 %   04 Feb 2018 16:51 Troponin 0.72 ng/mL / Creatine Kinase 151 U/L /  CKMB x     / CPK Mass Assay % x            TELEMETRY: 	    ECG:  	  RADIOLOGY:  < from: CT Angio Abdomen and Pelvis w/ IV Cont (02.10.18 @ 15:21) >  IMPRESSION:     Infrarenal AAA status post NIALL. The stent extends above the right renal   artery, from the level of the SMA. Although slightly difficult to   visualize due to streak artifact, there is likely moderate severe right   renal artery stenosis at its origin. Moderate SMA stenosis and severe   celiac stenosis also noted, as above.    Small bilateral pleural effusions with left lower lobe and possibly right   lower lobe pneumonia.    Redemonstration of 1.8 cm enhancing right lower pole solid renal mass.     < end of copied text >    OTHER: 	    PREVIOUS DIAGNOSTIC TESTING:    [ ] Echocardiogram:  [ ]  Catheterization:  [ ] Stress Test:

## 2018-02-11 NOTE — PROGRESS NOTE ADULT - PROBLEM SELECTOR PLAN 4
In setting of renal failure: Serum phosphorus is 8.1 today. Can start on renagel 1600 mg TID with meals. Monitor serum phosphorus level.

## 2018-02-11 NOTE — PROGRESS NOTE ADULT - ASSESSMENT
77 year old man with past medical history of AAA 5.5cm s/p EVAAR repair on 1/29 with incidental finding of Right Neoplasm, previous history of Left Neoplasm with Left Nephrectomy in 2009, Bladder Cancer, Known LBBB, HTN, HLD presents with hypoxic respiratory failure 2/2 to pulmonary edema  2/2 ADHF + multifocal PNA in the setting of DONNIE on CKD with evidence of severe renal artery stenosis and TVD on cath    Neuro: AOx2-3  Confused at times, likely delirium in the setting of hospital stay. Able to easily re-orient    CV: Pulmonary edema 2/2 to ADHF + volume overload in the setting of acute renal failure. Now resolved s/p dialysis   LHC with triple vessel disease, PCI planned after renal artery intervention. Continue with aspirin, bb, statin  TTE with evidence of severe LV dysfunction, Given LHC findings, likely ischemic cardiomyopathy.   Continue with hydralazine, isordil, low dose bb  for preload/afterload reduction. Continue to monitor oxy sat  Afib with RVR: new onset, in the setting of sepsis, HF. No repeat episodes Continue with BB, uptitrate as tolerated. CHADSVASC score of 4, started on a heparin gtt, now on argatroban gtt given concern for HIT. Monitor bleeding       Pulm: Evidence of multifocal pna on xray. Given recent hospitalization, will treat for HCAP for total of 7 days. Blood cx NGTD  Wean off oxygen as tolerated    Renal: DONNIE on CKD in the setting of recent surgery, iv contrast for imaging. Renal US significant for high grade stenosis at the origin of the renal artery. Plan for the Cath of the renal artery with likely stenting. Renal artery could not be engaged, unclear relation of stent structure to artery.   CTA aorta with 1mm cuts to eval renal artery takeoff.  Plan for HD today after CTA. Volume overloaded and K elevated although specimen mildly hemolyzed. Monitor Scr, electrolytes, UOP.    Heme/Onc: Spoke with pt's outpatient urologist Dr. Santiago. He is aware of the lesion on pt's right kidney but says given its size, does not require intervention at this time and states we should prioritize treating potential ischemic disease. He will continue to monitor the mass as outpatient.   R/o HIT: HIT score ~4-5. 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 Awaiting PRADEEP for confirmation. For now continue with argatroban gtt    ID: Pt initially presented with severe sepsis likely 2/2 to PNA with signs of end organ dysfunction (donnie, elevated trops) now improving s/p abx. WBC downtrending. Vanc level high, held dose. Continue to trend    GI: DASH diet, no issues    DVT ppx: hep 77 year old man with past medical history of AAA 5.5cm s/p EVAAR repair on 1/29 with incidental finding of Right Neoplasm, previous history of Left Neoplasm with Left Nephrectomy in 2009, Bladder Cancer, Known LBBB, HTN, HLD presents with hypoxic respiratory failure 2/2 to pulmonary edema  2/2 ADHF + multifocal PNA in the setting of DONNIE on CKD with evidence of severe renal artery stenosis and TVD on cath    #Neuro: AOx2-3  Confused at times, likely delirium in the setting of hospital stay. Able to easily re-orient    #CV: Pulmonary edema 2/2 to ADHF + volume overload in the setting of acute renal failure. Now resolved s/p dialysis   LHC with triple vessel disease, PCI planned after renal artery intervention. Continue with aspirin, bb, statin  TTE with evidence of severe LV dysfunction, Given LHC findings, likely ischemic cardiomyopathy.   Continue with hydralazine, isordil, low dose bb  for preload/afterload reduction. Continue to monitor oxy sat  Afib with RVR: new onset, in the setting of sepsis, HF. No repeat episodes Continue with BB, uptitrate as tolerated. 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 Given recent hospitalization, will treat for HCAP for total of 7 days. Blood cx NGTD  Wean off oxygen as tolerated    #Renal: DONNIE on CKD in the setting of recent surgery, iv contrast for imaging. Renal US significant for high grade stenosis at the origin of the renal artery. Plan for the Cath of the renal artery with likely stenting. Renal artery could not be engaged, unclear relation of stent structure to artery.   CTA aorta with 1mm cuts to eval renal artery takeoff.  Plan for HD today after CTA. Volume overloaded and K elevated although specimen mildly hemolyzed. Monitor Scr, electrolytes, UOP.    #Heme/Onc: Spoke with pt's outpatient urologist Dr. Santiago. He is aware of the lesion on pt's right kidney but says given its size, does not require intervention at this time and states we should prioritize treating potential ischemic disease. He will continue to monitor the mass as outpatient.   HIT score ~4-5. 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 Awaiting PRADEEP for confirmation, c/w argatroban gtt  -trend LFTs every few days on Argatraban  -when PLT >150k, can bridge to coumadin over 5 days, INR 2-3    #ID: Pt initially presented with severe sepsis likely 2/2 to PNA with signs of end organ dysfunction (donnie, elevated trops) now improving s/p abx. WBC downtrending. Vanc level high, held dose. -Continue to trend, dose vanc by level    #GI: DASH diet, no issues  -trend LFTs on Argatraban    DVT ppx: argatraban 77 year old man with past medical history of AAA 5.5cm s/p EVAAR repair on 1/29 with incidental finding of Right Neoplasm, previous history of Left Neoplasm with Left Nephrectomy in 2009, Bladder Cancer, Known LBBB, HTN, HLD presents with hypoxic respiratory failure 2/2 to pulmonary edema  2/2 ADHF + multifocal PNA in the setting of DONNIE on CKD with evidence of severe renal artery stenosis and TVD on cath    #Neuro: AOx2-3  Confused at times, likely delirium in the setting of hospital stay. Able to easily re-orient    #CV: Pulmonary edema 2/2 to ADHF + volume overload in the setting of acute renal failure. Now resolved s/p dialysis   LHC with triple vessel disease, PCI planned after renal artery intervention. Continue with aspirin, bb, statin  TTE with evidence of severe LV dysfunction, Given LHC findings, likely ischemic cardiomyopathy.   -c/w hydralazine, isordil, low dose bb  for preload/afterload reduction. Continue to monitor oxy sat  -c/w ASA  -Afib with RVR: new onset, in the setting of sepsis, HF. No repeat episodes Continue with BB, uptitrate as tolerated. 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 Given recent hospitalization, will treat for HCAP for total of 7 days. Blood cx NGTD  Wean off oxygen as tolerated    #Renal: DONNIE on CKD in the setting of recent surgery, iv contrast for imaging. Renal US significant for high grade stenosis at the origin of the renal artery. Plan for the Cath of the renal artery with likely stenting. Renal artery could not be engaged, unclear relation of stent structure to artery.   CTA aorta shows stent extends past R renal artery to SMA  -Volume overloaded and K elevated although specimen mildly hemolyzed. Monitor Scr, electrolytes, UOP.  -f/u with interventional cardiology Dr. Heriberto Garg when intervene percutaneously for WOOD    #Heme/Onc: Spoke with pt's outpatient urologist Dr. Santiago. He is aware of the lesion on pt's right kidney but says given its size, does not require intervention at this time and states we should prioritize treating potential ischemic disease. He will continue to monitor the mass as outpatient.   HIT score ~4-5. 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 Awaiting PRADEEP for confirmation, c/w argatroban gtt  -trend LFTs every few days on Argatraban  -when PLT >150k, can bridge to coumadin over 5 days, INR 2-3    #ID: Pt initially presented with severe sepsis likely 2/2 to PNA with signs of end organ dysfunction (donnie, elevated trops) now improving s/p abx. WBC downtrending. Vanc level high, held dose. -Continue to trend, dose vanc by level    #GI: DASH diet, no issues  -trend LFTs on Argatraban    DVT ppx: argatraban

## 2018-02-12 LAB
ALBUMIN SERPL ELPH-MCNC: 3 G/DL — LOW (ref 3.3–5)
ALP SERPL-CCNC: 67 U/L — SIGNIFICANT CHANGE UP (ref 40–120)
ALT FLD-CCNC: 38 U/L RC — SIGNIFICANT CHANGE UP (ref 10–45)
ANION GAP SERPL CALC-SCNC: 17 MMOL/L — SIGNIFICANT CHANGE UP (ref 5–17)
APTT BLD: 71.5 SEC — HIGH (ref 27.5–37.4)
AST SERPL-CCNC: 50 U/L — HIGH (ref 10–40)
BILIRUB SERPL-MCNC: 0.5 MG/DL — SIGNIFICANT CHANGE UP (ref 0.2–1.2)
BUN SERPL-MCNC: 44 MG/DL — HIGH (ref 7–23)
CALCIUM SERPL-MCNC: 8.3 MG/DL — LOW (ref 8.4–10.5)
CHLORIDE SERPL-SCNC: 94 MMOL/L — LOW (ref 96–108)
CK MB CFR SERPL CALC: 2.7 NG/ML — SIGNIFICANT CHANGE UP (ref 0–6.7)
CK SERPL-CCNC: 61 U/L — SIGNIFICANT CHANGE UP (ref 30–200)
CO2 SERPL-SCNC: 23 MMOL/L — SIGNIFICANT CHANGE UP (ref 22–31)
CREAT SERPL-MCNC: 4.95 MG/DL — HIGH (ref 0.5–1.3)
GLUCOSE SERPL-MCNC: 103 MG/DL — HIGH (ref 70–99)
HCT VFR BLD CALC: 24.4 % — LOW (ref 39–50)
HGB BLD-MCNC: 8.4 G/DL — LOW (ref 13–17)
INR BLD: 2.63 RATIO — HIGH (ref 0.88–1.16)
LACTATE SERPL-SCNC: 0.8 MMOL/L — SIGNIFICANT CHANGE UP (ref 0.7–2)
MAGNESIUM SERPL-MCNC: 2.2 MG/DL — SIGNIFICANT CHANGE UP (ref 1.6–2.6)
MCHC RBC-ENTMCNC: 32.1 PG — SIGNIFICANT CHANGE UP (ref 27–34)
MCHC RBC-ENTMCNC: 34.2 GM/DL — SIGNIFICANT CHANGE UP (ref 32–36)
MCV RBC AUTO: 93.8 FL — SIGNIFICANT CHANGE UP (ref 80–100)
OB PNL STL: NEGATIVE — SIGNIFICANT CHANGE UP
PHOSPHATE SERPL-MCNC: 4.5 MG/DL — SIGNIFICANT CHANGE UP (ref 2.5–4.5)
PLATELET # BLD AUTO: 94 K/UL — LOW (ref 150–400)
POTASSIUM SERPL-MCNC: 4 MMOL/L — SIGNIFICANT CHANGE UP (ref 3.5–5.3)
POTASSIUM SERPL-SCNC: 4 MMOL/L — SIGNIFICANT CHANGE UP (ref 3.5–5.3)
PROT SERPL-MCNC: 6.5 G/DL — SIGNIFICANT CHANGE UP (ref 6–8.3)
PROTHROM AB SERPL-ACNC: 29 SEC — HIGH (ref 9.8–12.7)
RBC # BLD: 2.6 M/UL — LOW (ref 4.2–5.8)
RBC # FLD: 12.3 % — SIGNIFICANT CHANGE UP (ref 10.3–14.5)
SODIUM SERPL-SCNC: 134 MMOL/L — LOW (ref 135–145)
SRA INTERP SER-IMP: SIGNIFICANT CHANGE UP
TROPONIN T SERPL-MCNC: 0.62 NG/ML — HIGH (ref 0–0.06)
VANCOMYCIN TROUGH SERPL-MCNC: 19.5 UG/ML — SIGNIFICANT CHANGE UP (ref 10–20)
WBC # BLD: 14.2 K/UL — HIGH (ref 3.8–10.5)
WBC # FLD AUTO: 14.2 K/UL — HIGH (ref 3.8–10.5)

## 2018-02-12 PROCEDURE — 99291 CRITICAL CARE FIRST HOUR: CPT

## 2018-02-12 PROCEDURE — 99233 SBSQ HOSP IP/OBS HIGH 50: CPT

## 2018-02-12 PROCEDURE — 71045 X-RAY EXAM CHEST 1 VIEW: CPT | Mod: 26

## 2018-02-12 PROCEDURE — 93010 ELECTROCARDIOGRAM REPORT: CPT

## 2018-02-12 RX ADMIN — ISOSORBIDE DINITRATE 10 MILLIGRAM(S): 5 TABLET ORAL at 13:12

## 2018-02-12 RX ADMIN — Medication 10 MILLIGRAM(S): at 22:36

## 2018-02-12 RX ADMIN — SEVELAMER CARBONATE 1600 MILLIGRAM(S): 2400 POWDER, FOR SUSPENSION ORAL at 13:12

## 2018-02-12 RX ADMIN — ISOSORBIDE DINITRATE 10 MILLIGRAM(S): 5 TABLET ORAL at 21:19

## 2018-02-12 RX ADMIN — ISOSORBIDE DINITRATE 10 MILLIGRAM(S): 5 TABLET ORAL at 05:11

## 2018-02-12 RX ADMIN — Medication 100 MILLIGRAM(S): at 21:20

## 2018-02-12 RX ADMIN — Medication 100 MILLIGRAM(S): at 13:12

## 2018-02-12 RX ADMIN — Medication 50 MILLIGRAM(S): at 05:11

## 2018-02-12 RX ADMIN — ATORVASTATIN CALCIUM 80 MILLIGRAM(S): 80 TABLET, FILM COATED ORAL at 21:21

## 2018-02-12 RX ADMIN — ARGATROBAN 12.24 MICROGRAM(S)/KG/MIN: 50 INJECTION, SOLUTION INTRAVENOUS at 21:20

## 2018-02-12 RX ADMIN — Medication 100 MILLIGRAM(S): at 05:11

## 2018-02-12 RX ADMIN — Medication 81 MILLIGRAM(S): at 13:12

## 2018-02-12 NOTE — PROGRESS NOTE ADULT - PROBLEM SELECTOR PLAN 3
Patient noted to have wheezes and rhonchi and rales on examination today. Will arrange for UF today.

## 2018-02-12 NOTE — PROGRESS NOTE ADULT - PROBLEM SELECTOR PLAN 1
Patient with DONNIE on CKD in setting of cardiorenal syndrome and ACE-I use and now hemodynamically significant WOOD and ARB use: Baseline creatinine is between 1.2-1.3. Scr. on presentation was 6.48. Complements noted to be WNL.  Patient was initiated on CVVHDF on 2/5/18 and transitioned to IHD. Last HD was on 2/11/18. Patient remains anuric. CT angiogram was done which showed significant right renal artery stenosis.  Discussed with CCU plan for percutaneous intervention tomorrow.  Ultrafiltration today for volume status.

## 2018-02-12 NOTE — PROGRESS NOTE ADULT - SUBJECTIVE AND OBJECTIVE BOX
HPI:  77 year old man with past medical history of AAA 5.5cm s/p EVAAR repair with stents on  with incidental finding of Right Renal Neoplasm, previous history of Left Renal Neoplasm with Left Nephrectomy in , Bladder Cancer, Known LBBB, HTN, HLD transferred from [facility] after initially presenting with SOB which began last night. Patient states that he was discharged from the hospital following AAA repair and recovering well.  He was walking with walker at home with no complaints.  After going to bed, he was awoken from his sleep complaining of SOB with no relief.  He went to [facility] wtih his SOB getting progressively worse with 1 episode of vomitus before going to hospital.  Patient denies fever, chills, recent sick contact, Chest pain, Abdominal pain, diarrhea.  At [facility], paient with elevate d-dimers and transferred for possible PE.  At [facility], patient with  RICKI in V2-V4 with elevated Trops, and ADHF requiring Bipap. (2018 17:51)    Review Of Systems:  Constitutional: [ ] Fever [ ] Chills [ ] Fatigue [ ] Weight change   HEENT: [ ] Blurred vision [ ] Eye Pain [ ] Headache [ ] Runny nose [ ] Sore Throat   Respiratory: [ ] Cough [ ] Wheezing [ ] Shortness of breath  Cardiovascular: [ ] Chest Pain [ ] Palpitations [ ] GORDON [ ] PND [ ] Orthopnea  Gastrointestinal: [ ] Abdominal Pain [ ] Diarrhea [ ] Constipation [ ] Hemorrhoids [ ] Nausea [ ] Vomiting  Genitourinary: [ ] Nocturia [ ] Dysuria [ ] Incontinence  Extremities: [ ] Swelling [ ] Joint Pain  Neurologic: [ ] Focal deficit [ ] Paresthesias [ ] Syncope  Lymphatic: [ ] Swelling [ ] Lymphadenopathy   Skin: [ ] Rash [ ] Ecchymoses [ ] Wounds [ ] Lesions  Psychiatry: [ ] Depression [ ] Suicidal/Homicidal Ideation [ ] Anxiety [ ] Sleep Disturbances  [ ] 10 point review of systems is otherwise negative except as mentioned above            [ ]Unable to obtain    Medications:  argatroban Infusion 3 MICROgram(s)/kG/Min IV Continuous <Continuous>  aspirin enteric coated 81 milliGRAM(s) Oral daily  atorvastatin 80 milliGRAM(s) Oral at bedtime  diazepam    Tablet 10 milliGRAM(s) Oral two times a day PRN  docusate sodium 100 milliGRAM(s) Oral daily  hydrALAZINE 100 milliGRAM(s) Oral every 8 hours  isosorbide   dinitrate Tablet (ISORDIL) 10 milliGRAM(s) Oral three times a day  metoprolol succinate ER 50 milliGRAM(s) Oral daily  polyethylene glycol 3350 17 Gram(s) Oral daily PRN  senna 1 Tablet(s) Oral at bedtime  sevelamer hydrochloride 1600 milliGRAM(s) Oral three times a day with meals    PMH/PSH/FH/SH: [ ] Unchanged  Vitals:  T(C): 36.4 (18 @ 12:00), Max: 37.1 (18 @ 20:00)  HR: 90 (18 @ 13:00) (88 - 102)  BP: --  BP(mean): --  RR: 26 (18 @ 13:00) (11 - 41)  SpO2: 98% (18 @ 13:00) (91% - 100%)  Wt(kg): --  Daily     Daily Weight in k.6 (2018 06:00)  I&O's Summary    2018 07:  -  2018 07:00  --------------------------------------------------------  IN: 1032.8 mL / OUT: 2000 mL / NET: -967.2 mL    2018 07:  -  2018 13:25  --------------------------------------------------------  IN: 85.4 mL / OUT: 0 mL / NET: 85.4 mL        Physical Exam:  Appearance: [ ] Normal [ ] NAD  Eyes: [ ] PERRL [ ] EOMI  HENT: [ ] Normal oral muscosa [ ]NC/AT  Cardiovascular: [ ] S1 [ ] S2 [ ] RRR [ ] No m/r/g [ ]No edema [ ] JVP  Procedural Access Site: [ ] No hematoma [ ] Non-tender to palpation [ ] 2+ pulse [ ] No bruit [ ] No Ecchymosis  Respiratory: [ ] Clear to auscultation bilaterally  Gastrointestinal: [ ] Soft [ ] Non-tender [ ] Non-distended [ ] BS+  Musculoskeletal: [ ] No clubbing [ ] No joint deformity   Neurologic: [ ] Non-focal  Lymphatic: [ ] No lymphadenopathy  Psychiatry: [ ] AAOx3 [ ] Mood & affect appropriate  Skin: [ ] No rashes [ ] No ecchymoses [ ] No cyanosis        134<L>  |  94<L>  |  44<H>  ----------------------------<  103<H>  4.0   |  23  |  4.95<H>    Ca    8.3<L>      2018 02:21  Phos  4.5       Mg     2.2         TPro  6.5  /  Alb  3.0<L>  /  TBili  0.5  /  DBili  x   /  AST  50<H>  /  ALT  38  /  AlkPhos  67      PT/INR - ( 2018 02:21 )   PT: 29.0 sec;   INR: 2.63 ratio         PTT - ( 2018 02:21 )  PTT:71.5 sec  CARDIAC MARKERS ( 2018 02:21 )  x     / 0.62 ng/mL / 61 U/L / x     / 2.7 ng/mL              ECG:    Echo:    Stress Testing:     Cath:    Imaging:    Interpretation of Telemetry: HPI:  The patient was diagnosed with HIT and started on argatroban. Had bleeding at the area of the dialysis catheter but eventually  he was able to have dialysis yesterday. After dialysis and fluid removal his SOB has improved significantly.     Today he feels much better. No SOB at rest. Remains on high flow oxygen.    Review Of Systems:  [x] 10 point review of systems is otherwise negative except as mentioned above            Medications:  argatroban Infusion 3 MICROgram(s)/kG/Min IV Continuous <Continuous>  aspirin enteric coated 81 milliGRAM(s) Oral daily  atorvastatin 80 milliGRAM(s) Oral at bedtime  diazepam    Tablet 10 milliGRAM(s) Oral two times a day PRN  docusate sodium 100 milliGRAM(s) Oral daily  hydrALAZINE 100 milliGRAM(s) Oral every 8 hours  isosorbide   dinitrate Tablet (ISORDIL) 10 milliGRAM(s) Oral three times a day  metoprolol succinate ER 50 milliGRAM(s) Oral daily  polyethylene glycol 3350 17 Gram(s) Oral daily PRN  senna 1 Tablet(s) Oral at bedtime  sevelamer hydrochloride 1600 milliGRAM(s) Oral three times a day with meals    PMH/PSH/FH/SH: [ ] Unchanged  Vitals:  T(C): 36.4 (18 @ 12:00), Max: 37.1 (18 @ 20:00)  HR: 90 (18 @ 13:00) (88 - 102)  BP: --  BP(mean): --  RR: 26 (18 @ 13:00) (11 - 41)  SpO2: 98% (18 @ 13:00) (91% - 100%)  Wt(kg): --  Daily     Daily Weight in k.6 (2018 06:00)  I&O's Summary    2018 07:  -  2018 07:00  --------------------------------------------------------  IN: 1032.8 mL / OUT: 2000 mL / NET: -967.2 mL    2018 07:01  -  2018 13:25  --------------------------------------------------------  IN: 85.4 mL / OUT: 0 mL / NET: 85.4 mL    Physical Exam:  Appearance: [ x] Normal [ x] NAD  Eyes: [ x] PERRL [x ] EOMI  HEENT: [x ] Normal oral muscosa [x ]NC/AT  Cardiovascular: [x ] S1 [x ] S2 [ x] RRR [ x] No m/r/g  [x]No edema, No JVD  Respiratory: Decreased BS bilaterally  Gastrointestinal: [x ] Soft [x ] Non-tender [x ] Non-distended [x ] BS+  Musculoskeletal: [x ] No clubbing [x ] No joint deformity   Neurologic: [x ] Non-focal  Lymphatic: [x ] No lymphadenopathy  Psychiatry: [x ] AAOx3 [x ] Mood & affect appropriate  Skin: [x ] No rashes [x ] No ecchymoses [x ] No cyanosis        134<L>  |  94<L>  |  44<H>  ----------------------------<  103<H>  4.0   |  23  |  4.95<H>    Ca    8.3<L>      2018 02:21  Phos  4.5       Mg     2.2         TPro  6.5  /  Alb  3.0<L>  /  TBili  0.5  /  DBili  x   /  AST  50<H>  /  ALT  38  /  AlkPhos  67  02-12    PT/INR - ( 2018 02:21 )   PT: 29.0 sec;   INR: 2.63 ratio         PTT - ( 2018 02:21 )  PTT:71.5 sec  CARDIAC MARKERS ( 2018 02:21 )  x     / 0.62 ng/mL / 61 U/L / x     / 2.7 ng/mL              ECG:    Echo:    Stress Testing:     Cath:    Imaging:    Interpretation of Telemetry: HPI:  The patient was diagnosed with HIT and started on argatroban. Had bleeding at the area of the dialysis catheter but eventually  he was able to have dialysis yesterday. After dialysis and fluid removal his SOB has improved significantly.     Today he feels much better. No SOB at rest. Remains on high flow oxygen.    Review Of Systems:  [x] 10 point review of systems is otherwise negative except as mentioned above            Medications:  argatroban Infusion 3 MICROgram(s)/kG/Min IV Continuous <Continuous>  aspirin enteric coated 81 milliGRAM(s) Oral daily  atorvastatin 80 milliGRAM(s) Oral at bedtime  diazepam    Tablet 10 milliGRAM(s) Oral two times a day PRN  docusate sodium 100 milliGRAM(s) Oral daily  hydrALAZINE 100 milliGRAM(s) Oral every 8 hours  isosorbide   dinitrate Tablet (ISORDIL) 10 milliGRAM(s) Oral three times a day  metoprolol succinate ER 50 milliGRAM(s) Oral daily  polyethylene glycol 3350 17 Gram(s) Oral daily PRN  senna 1 Tablet(s) Oral at bedtime  sevelamer hydrochloride 1600 milliGRAM(s) Oral three times a day with meals    PMH/PSH/FH/SH: [ ] Unchanged  Vitals:  T(C): 36.4 (18 @ 12:00), Max: 37.1 (18 @ 20:00)  HR: 90 (18 @ 13:00) (88 - 102)  BP: --  BP(mean): --  RR: 26 (18 @ 13:00) (11 - 41)  SpO2: 98% (18 @ 13:00) (91% - 100%)  Wt(kg): --  Daily     Daily Weight in k.6 (2018 06:00)  I&O's Summary    2018 07:  -  2018 07:00  --------------------------------------------------------  IN: 1032.8 mL / OUT: 2000 mL / NET: -967.2 mL    2018 07:01  -  2018 13:25  --------------------------------------------------------  IN: 85.4 mL / OUT: 0 mL / NET: 85.4 mL    Physical Exam:  Appearance: [ x] NAD  Eyes: [ x] PERRL [x ] EOMI  HEENT: [x ] Normal oral muscosa [x ]NC/AT, Right IJ shiley and Left IJV cordis catheter in placed  Cardiovascular: Irregularly irregular rhythm,  [ x] No m/r/g  [x]No edema in b/l LE  Respiratory: Decreased BS bilaterally  Gastrointestinal: [x ] Soft [x ] Non-tender [x ] Non-distended [x ] BS+  Musculoskeletal: [x ] No clubbing [x ] No joint deformity   Neurologic: [x ] Non-focal  Lymphatic: [x ] No lymphadenopathy  Psychiatry: [x ] AAOx3 [x ] Mood & affect appropriate  Skin: [x ] No rashes [x ] No cyanosis        134<L>  |  94<L>  |  44<H>  ----------------------------<  103<H>  4.0   |  23  |  4.95<H>    Ca    8.3<L>      2018 02:21  Phos  4.5       Mg     2.2         TPro  6.5  /  Alb  3.0<L>  /  TBili  0.5  /  DBili  x   /  AST  50<H>  /  ALT  38  /  AlkPhos  67  02-12    PT/INR - ( 2018 02:21 )   PT: 29.0 sec;   INR: 2.63 ratio         PTT - ( 2018 02:21 )  PTT:71.5 sec  CARDIAC MARKERS ( 2018 02:21 )  x     / 0.62 ng/mL / 61 U/L / x     / 2.7 ng/mL              ECG:    Echo:    Stress Testing:     Cath:    Imaging:    Interpretation of Telemetry: HPI:  The patient was diagnosed with HIT and started on argatroban. Had bleeding at the area of the dialysis catheter but eventually  he was able to have dialysis yesterday. After dialysis and fluid removal his SOB has improved significantly.     Today he feels much better. No SOB at rest. Remains on high flow oxygen.    Review Of Systems:  [x] 10 point review of systems is otherwise negative except as mentioned above            Medications:  argatroban Infusion 3 MICROgram(s)/kG/Min IV Continuous <Continuous>  aspirin enteric coated 81 milliGRAM(s) Oral daily  atorvastatin 80 milliGRAM(s) Oral at bedtime  diazepam    Tablet 10 milliGRAM(s) Oral two times a day PRN  docusate sodium 100 milliGRAM(s) Oral daily  hydrALAZINE 100 milliGRAM(s) Oral every 8 hours  isosorbide   dinitrate Tablet (ISORDIL) 10 milliGRAM(s) Oral three times a day  metoprolol succinate ER 50 milliGRAM(s) Oral daily  polyethylene glycol 3350 17 Gram(s) Oral daily PRN  senna 1 Tablet(s) Oral at bedtime  sevelamer hydrochloride 1600 milliGRAM(s) Oral three times a day with meals    PMH/PSH/FH/SH: [ ] Unchanged  Vitals:  T(C): 36.4 (18 @ 12:00), Max: 37.1 (18 @ 20:00)  HR: 90 (18 @ 13:00) (88 - 102)  BP: --  BP(mean): --  RR: 26 (18 @ 13:00) (11 - 41)  SpO2: 98% (18 @ 13:00) (91% - 100%)  Wt(kg): --  Daily     Daily Weight in k.6 (2018 06:00)  I&O's Summary    2018 07:  -  2018 07:00  --------------------------------------------------------  IN: 1032.8 mL / OUT: 2000 mL / NET: -967.2 mL    2018 07:01  -  2018 13:25  --------------------------------------------------------  IN: 85.4 mL / OUT: 0 mL / NET: 85.4 mL    Physical Exam:  Appearance: [ x] NAD  Eyes: [ x] PERRL [x ] EOMI  HEENT: [x ] Normal oral muscosa [x ]NC/AT, Right IJ shiley and Left IJV cordis catheter in placed  Cardiovascular: Irregularly irregular rhythm,  [ x] No m/r/g  [x]No edema in b/l LE  Respiratory: Decreased BS bilaterally  Gastrointestinal: [x ] Soft [x ] Non-tender [x ] Non-distended [x ] BS+  Musculoskeletal: [x ] No clubbing [x ] No joint deformity   Neurologic: [x ] Non-focal  Lymphatic: [x ] No lymphadenopathy  Psychiatry: [x ] AAOx3 [x ] Mood & affect appropriate  Skin: [x ] No rashes [x ] No cyanosis        134<L>  |  94<L>  |  44<H>  ----------------------------<  103<H>  4.0   |  23  |  4.95<H>    Ca    8.3<L>      2018 02:21  Phos  4.5       Mg     2.2         TPro  6.5  /  Alb  3.0<L>  /  TBili  0.5  /  DBili  x   /  AST  50<H>  /  ALT  38  /  AlkPhos  67  0212    PT/INR - ( 2018 02:21 )   PT: 29.0 sec;   INR: 2.63 ratio         PTT - ( 2018 02:21 )  PTT:71.5 sec  CARDIAC MARKERS ( 2018 02:21 )  x     / 0.62 ng/mL / 61 U/L / x     / 2.7 ng/mL

## 2018-02-12 NOTE — PROGRESS NOTE ADULT - SUBJECTIVE AND OBJECTIVE BOX
Patient is a 77y old  Male who presents with a chief complaint of Shortness of Breath (04 Feb 2018 17:51)    Overnight events/Subjective:    	  HPI:  This is a 77 year old man with past medical history of AAA 5.5cm s/p EVAAR repair with stents on 1/29 with incidental finding of Right Renal Neoplasm, previous history of Left Renal Neoplasm with Left Nephrectomy in 2008, Bladder Cancer, Known LBBB, HTN, HLD transferred from [facility] after initially presenting with SOB which began last night. Patient states that he was discharged from the hospital following AAA repair and recovering well.  He was walking with walker at home with no complaints.  After going to bed, he was awoken from his sleep complaining of SOB with no relief.  He went to [facility] wtih his SOB getting progressively worse with 1 episode of vomitus before going to hospital.  Patient denies fever, chills, recent sick contact, Chest pain, Abdominal pain, diarrhea.  At [place], paient with elevate d-dimers and transferred for possible PE.  At [facility], patient with  RICKI in V2-V4 with elevated Trops, and ADHF requiring Bipap. (04 Feb 2018 17:51)      MEDICATIONS  (STANDING):  argatroban Infusion 3 MICROgram(s)/kG/Min (12.24 mL/Hr) IV Continuous <Continuous>  aspirin enteric coated 81 milliGRAM(s) Oral daily  atorvastatin 80 milliGRAM(s) Oral at bedtime  docusate sodium 100 milliGRAM(s) Oral daily  hydrALAZINE 100 milliGRAM(s) Oral every 8 hours  isosorbide   dinitrate Tablet (ISORDIL) 10 milliGRAM(s) Oral three times a day  metoprolol succinate ER 50 milliGRAM(s) Oral daily  senna 1 Tablet(s) Oral at bedtime  sevelamer hydrochloride 1600 milliGRAM(s) Oral three times a day with meals    MEDICATIONS  (PRN):  diazepam    Tablet 10 milliGRAM(s) Oral two times a day PRN anxiety  polyethylene glycol 3350 17 Gram(s) Oral daily PRN Constipation      REVIEW OF SYSTEMS:  Otherwise, 10 point ROS done and otherwise negative.    PHYSICAL EXAM:  Vital Signs Last 24 Hrs  T(C): 37.1 (12 Feb 2018 05:00), Max: 37.1 (11 Feb 2018 20:00)  T(F): 98.8 (12 Feb 2018 05:00), Max: 98.8 (12 Feb 2018 05:00)  HR: 100 (12 Feb 2018 07:00) (90 - 102)  BP: --  BP(mean): --  RR: 33 (12 Feb 2018 07:00) (11 - 41)  SpO2: 98% (12 Feb 2018 07:00) (91% - 100%)  I&O's Summary    11 Feb 2018 07:01  -  12 Feb 2018 07:00  --------------------------------------------------------  IN: 1032.8 mL / OUT: 2000 mL / NET: -967.2 mL        Appearance: Normal	  HEENT:   Normal oral mucosa, PERRL, EOMI	  Lymphatic: No lymphadenopathy  Cardiovascular: Normal S1 S2, No JVD, No murmurs, No edema  Respiratory: Lungs clear to auscultation	  Psychiatry: A & O x 3, Mood & affect appropriate  Gastrointestinal:  Soft, Non-tender, + BS	  Skin: No rashes, No ecchymoses, No cyanosis	  Neurologic: Non-focal  Extremities: Normal range of motion, No clubbing, cyanosis or edema  Vascular: Peripheral pulses palpable 2+ bilaterally    LABS:	 	                        8.4    14.2  )-----------( 94       ( 12 Feb 2018 02:21 )             24.4     Auto Eosinophil # x     / Auto Eosinophil % x     / Auto Neutrophil # x     / Auto Neutrophil % x     / BANDS % x                            6.8    13.2  )-----------( 107      ( 11 Feb 2018 06:50 )             19.4     Auto Eosinophil # x     / Auto Eosinophil % x     / Auto Neutrophil # x     / Auto Neutrophil % x     / BANDS % x                            7.1    14.3  )-----------( 104      ( 11 Feb 2018 05:54 )             20.5     Auto Eosinophil # 0.0   / Auto Eosinophil % 0.2   / Auto Neutrophil # 13.2  / Auto Neutrophil % 92.4  / BANDS % x        INR: 2.63 ratio (02-12 @ 02:21)  INR: 2.99 ratio (02-11 @ 05:54)  INR: 2.56 ratio (02-10 @ 23:25)    02-12    134<L>  |  94<L>  |  44<H>  ----------------------------<  103<H>  4.0   |  23  |  4.95<H>  02-11    132<L>  |  91<L>  |  71<H>  ----------------------------<  150<H>  5.1   |  22  |  7.27<H>    Ca    8.3<L>      12 Feb 2018 02:21  Mg     2.2     02-12  Phos  4.5     02-12  TPro  6.5  /  Alb  3.0<L>  /  TBili  0.5  /  DBili  x   /  AST  50<H>  /  ALT  38  /  AlkPhos  67  02-12  TPro  6.0  /  Alb  2.9<L>  /  TBili  0.4  /  DBili  x   /  AST  40  /  ALT  30  /  AlkPhos  60  02-11    Lactate, Blood: 0.8 mmol/L (02-12 @ 02:21)  Lactate, Blood: 0.9 mmol/L (02-11 @ 05:54)  Lactate, Blood: 0.7 mmol/L (02-10 @ 05:35)      proBNP:   Lipid Profile: 02-04 Chol 145 LDL 87 HDL 26<L> Trig 162<H>  HgA1c: 5.4 % (02-04 @ 21:37)    TSH:     CARDIAC MARKERS:   12 Feb 2018 02:21 Troponin 0.62 ng/mL / Creatine Kinase 61 U/L /  CKMB 2.7 ng/mL / CPK Mass Assay % x       05 Feb 2018 05:05 Troponin 0.73 ng/mL / Creatine Kinase 156 U/L /  CKMB 14.0 ng/mL / CPK Mass Assay % 9.0 %   05 Feb 2018 01:17 Troponin 0.80 ng/mL / Creatine Kinase 158 U/L /  CKMB 15.2 ng/mL / CPK Mass Assay % 9.6 %        TELEMETRY: 	    ECG:  	  RADIOLOGY:  OTHER: 	    PREVIOUS DIAGNOSTIC TESTING:    [ ] Echocardiogram:  [ ]  Catheterization:  [ ] Stress Test: Patient is a 77y old  Male who presents with a chief complaint of Shortness of Breath (04 Feb 2018 17:51)    Overnight events/Subjective:  Vanc level 19.5, vanc dose held. HD last night removed 2L UF. HFNC titrated down to FiO2 40%, 30 LPM. Pt feels ok. Denies CP, SOB, abd pain.  	  HPI:  This is a 77 year old man with past medical history of AAA 5.5cm s/p EVAAR repair with stents on 1/29 with incidental finding of Right Renal Neoplasm, previous history of Left Renal Neoplasm with Left Nephrectomy in 2008, Bladder Cancer, Known LBBB, HTN, HLD transferred from [facility] after initially presenting with SOB which began last night. Patient states that he was discharged from the hospital following AAA repair and recovering well.  He was walking with walker at home with no complaints.  After going to bed, he was awoken from his sleep complaining of SOB with no relief.  He went to [facility] wtih his SOB getting progressively worse with 1 episode of vomitus before going to hospital.  Patient denies fever, chills, recent sick contact, Chest pain, Abdominal pain, diarrhea.  At [place], paient with elevate d-dimers and transferred for possible PE.  At [facility], patient with  RICKI in V2-V4 with elevated Trops, and ADHF requiring Bipap. (04 Feb 2018 17:51)      MEDICATIONS  (STANDING):  argatroban Infusion 3 MICROgram(s)/kG/Min (12.24 mL/Hr) IV Continuous <Continuous>  aspirin enteric coated 81 milliGRAM(s) Oral daily  atorvastatin 80 milliGRAM(s) Oral at bedtime  docusate sodium 100 milliGRAM(s) Oral daily  hydrALAZINE 100 milliGRAM(s) Oral every 8 hours  isosorbide   dinitrate Tablet (ISORDIL) 10 milliGRAM(s) Oral three times a day  metoprolol succinate ER 50 milliGRAM(s) Oral daily  senna 1 Tablet(s) Oral at bedtime  sevelamer hydrochloride 1600 milliGRAM(s) Oral three times a day with meals    MEDICATIONS  (PRN):  diazepam    Tablet 10 milliGRAM(s) Oral two times a day PRN anxiety  polyethylene glycol 3350 17 Gram(s) Oral daily PRN Constipation      REVIEW OF SYSTEMS:  Otherwise, 10 point ROS done and otherwise negative.    PHYSICAL EXAM:  Vital Signs Last 24 Hrs  T(C): 37.1 (12 Feb 2018 05:00), Max: 37.1 (11 Feb 2018 20:00)  T(F): 98.8 (12 Feb 2018 05:00), Max: 98.8 (12 Feb 2018 05:00)  HR: 100 (12 Feb 2018 07:00) (90 - 102)  BP: --  BP(mean): --  RR: 33 (12 Feb 2018 07:00) (11 - 41)  SpO2: 98% (12 Feb 2018 07:00) (91% - 100%)  I&O's Summary    11 Feb 2018 07:01  -  12 Feb 2018 07:00  --------------------------------------------------------  IN: 1032.8 mL / OUT: 2000 mL / NET: -967.2 mL        Appearance: Normal	  HEENT:   Normal oral mucosa, PERRL, EOMI	  Lymphatic: No lymphadenopathy  Cardiovascular: Normal S1 S2, No JVD, No murmurs, No edema  Respiratory: Lungs clear to auscultation	  Psychiatry: A & O x 3, Mood & affect appropriate  Gastrointestinal:  Soft, Non-tender, + BS	  Skin: No rashes, No ecchymoses, No cyanosis	  Neurologic: Non-focal  Extremities: Normal range of motion, No clubbing, cyanosis or edema  Vascular: Peripheral pulses palpable 2+ bilaterally    LABS:	 	                        8.4    14.2  )-----------( 94       ( 12 Feb 2018 02:21 )             24.4     Auto Eosinophil # x     / Auto Eosinophil % x     / Auto Neutrophil # x     / Auto Neutrophil % x     / BANDS % x                            6.8    13.2  )-----------( 107      ( 11 Feb 2018 06:50 )             19.4     Auto Eosinophil # x     / Auto Eosinophil % x     / Auto Neutrophil # x     / Auto Neutrophil % x     / BANDS % x                            7.1    14.3  )-----------( 104      ( 11 Feb 2018 05:54 )             20.5     Auto Eosinophil # 0.0   / Auto Eosinophil % 0.2   / Auto Neutrophil # 13.2  / Auto Neutrophil % 92.4  / BANDS % x        INR: 2.63 ratio (02-12 @ 02:21)  INR: 2.99 ratio (02-11 @ 05:54)  INR: 2.56 ratio (02-10 @ 23:25)    02-12    134<L>  |  94<L>  |  44<H>  ----------------------------<  103<H>  4.0   |  23  |  4.95<H>  02-11    132<L>  |  91<L>  |  71<H>  ----------------------------<  150<H>  5.1   |  22  |  7.27<H>    Ca    8.3<L>      12 Feb 2018 02:21  Mg     2.2     02-12  Phos  4.5     02-12  TPro  6.5  /  Alb  3.0<L>  /  TBili  0.5  /  DBili  x   /  AST  50<H>  /  ALT  38  /  AlkPhos  67  02-12  TPro  6.0  /  Alb  2.9<L>  /  TBili  0.4  /  DBili  x   /  AST  40  /  ALT  30  /  AlkPhos  60  02-11    Lactate, Blood: 0.8 mmol/L (02-12 @ 02:21)  Lactate, Blood: 0.9 mmol/L (02-11 @ 05:54)  Lactate, Blood: 0.7 mmol/L (02-10 @ 05:35)      proBNP:   Lipid Profile: 02-04 Chol 145 LDL 87 HDL 26<L> Trig 162<H>  HgA1c: 5.4 % (02-04 @ 21:37)    TSH:     CARDIAC MARKERS:   12 Feb 2018 02:21 Troponin 0.62 ng/mL / Creatine Kinase 61 U/L /  CKMB 2.7 ng/mL / CPK Mass Assay % x       05 Feb 2018 05:05 Troponin 0.73 ng/mL / Creatine Kinase 156 U/L /  CKMB 14.0 ng/mL / CPK Mass Assay % 9.0 %   05 Feb 2018 01:17 Troponin 0.80 ng/mL / Creatine Kinase 158 U/L /  CKMB 15.2 ng/mL / CPK Mass Assay % 9.6 %        TELEMETRY: 	    ECG:  	  RADIOLOGY:  OTHER: 	    PREVIOUS DIAGNOSTIC TESTING:    [ ] Echocardiogram:  [ ]  Catheterization:  [ ] Stress Test: Patient is a 77y old  Male who presents with a chief complaint of Shortness of Breath (04 Feb 2018 17:51)    Overnight events/Subjective:  Vasc sx replaced leaking SAUD ashraf and HD last night removed 2L UF. Vanc level 19.5, vanc dose held. HFNC titrated down to FiO2 40%, 30 LPM. Pt feels ok. Denies CP, SOB, abd pain.  	  HPI:  This is a 77 year old man with past medical history of AAA 5.5cm s/p EVAAR repair with stents on 1/29 with incidental finding of Right Renal Neoplasm, previous history of Left Renal Neoplasm with Left Nephrectomy in 2008, Bladder Cancer, Known LBBB, HTN, HLD transferred from [facility] after initially presenting with SOB which began last night. Patient states that he was discharged from the hospital following AAA repair and recovering well.  He was walking with walker at home with no complaints.  After going to bed, he was awoken from his sleep complaining of SOB with no relief.  He went to [facility] wtih his SOB getting progressively worse with 1 episode of vomitus before going to hospital.  Patient denies fever, chills, recent sick contact, Chest pain, Abdominal pain, diarrhea.  At [place], paient with elevate d-dimers and transferred for possible PE.  At [facility], patient with  RICKI in V2-V4 with elevated Trops, and ADHF requiring Bipap. (04 Feb 2018 17:51)      MEDICATIONS  (STANDING):  argatroban Infusion 3 MICROgram(s)/kG/Min (12.24 mL/Hr) IV Continuous <Continuous>  aspirin enteric coated 81 milliGRAM(s) Oral daily  atorvastatin 80 milliGRAM(s) Oral at bedtime  docusate sodium 100 milliGRAM(s) Oral daily  hydrALAZINE 100 milliGRAM(s) Oral every 8 hours  isosorbide   dinitrate Tablet (ISORDIL) 10 milliGRAM(s) Oral three times a day  metoprolol succinate ER 50 milliGRAM(s) Oral daily  senna 1 Tablet(s) Oral at bedtime  sevelamer hydrochloride 1600 milliGRAM(s) Oral three times a day with meals    MEDICATIONS  (PRN):  diazepam    Tablet 10 milliGRAM(s) Oral two times a day PRN anxiety  polyethylene glycol 3350 17 Gram(s) Oral daily PRN Constipation      REVIEW OF SYSTEMS:  Otherwise, 10 point ROS done and otherwise negative.    PHYSICAL EXAM:  Vital Signs Last 24 Hrs  T(C): 37.1 (12 Feb 2018 05:00), Max: 37.1 (11 Feb 2018 20:00)  T(F): 98.8 (12 Feb 2018 05:00), Max: 98.8 (12 Feb 2018 05:00)  HR: 100 (12 Feb 2018 07:00) (90 - 102)  BP: --  BP(mean): --  RR: 33 (12 Feb 2018 07:00) (11 - 41)  SpO2: 98% (12 Feb 2018 07:00) (91% - 100%)  I&O's Summary    11 Feb 2018 07:01  -  12 Feb 2018 07:00  --------------------------------------------------------  IN: 1032.8 mL / OUT: 2000 mL / NET: -967.2 mL        Appearance: Normal	  HEENT:   Normal oral mucosa, PERRL, EOMI	  Lymphatic: No lymphadenopathy  Cardiovascular: Normal S1 S2, No JVD, No murmurs, No edema  Respiratory: Lungs clear to auscultation	  Psychiatry: A & O x 3, Mood & affect appropriate  Gastrointestinal:  Soft, Non-tender, + BS	  Skin: No rashes, No ecchymoses, No cyanosis	  Neurologic: Non-focal  Extremities: Normal range of motion, No clubbing, cyanosis or edema  Vascular: Peripheral pulses palpable 2+ bilaterally    LABS:	 	                        8.4    14.2  )-----------( 94       ( 12 Feb 2018 02:21 )             24.4     Auto Eosinophil # x     / Auto Eosinophil % x     / Auto Neutrophil # x     / Auto Neutrophil % x     / BANDS % x                            6.8    13.2  )-----------( 107      ( 11 Feb 2018 06:50 )             19.4     Auto Eosinophil # x     / Auto Eosinophil % x     / Auto Neutrophil # x     / Auto Neutrophil % x     / BANDS % x                            7.1    14.3  )-----------( 104      ( 11 Feb 2018 05:54 )             20.5     Auto Eosinophil # 0.0   / Auto Eosinophil % 0.2   / Auto Neutrophil # 13.2  / Auto Neutrophil % 92.4  / BANDS % x        INR: 2.63 ratio (02-12 @ 02:21)  INR: 2.99 ratio (02-11 @ 05:54)  INR: 2.56 ratio (02-10 @ 23:25)    02-12    134<L>  |  94<L>  |  44<H>  ----------------------------<  103<H>  4.0   |  23  |  4.95<H>  02-11    132<L>  |  91<L>  |  71<H>  ----------------------------<  150<H>  5.1   |  22  |  7.27<H>    Ca    8.3<L>      12 Feb 2018 02:21  Mg     2.2     02-12  Phos  4.5     02-12  TPro  6.5  /  Alb  3.0<L>  /  TBili  0.5  /  DBili  x   /  AST  50<H>  /  ALT  38  /  AlkPhos  67  02-12  TPro  6.0  /  Alb  2.9<L>  /  TBili  0.4  /  DBili  x   /  AST  40  /  ALT  30  /  AlkPhos  60  02-11    Lactate, Blood: 0.8 mmol/L (02-12 @ 02:21)  Lactate, Blood: 0.9 mmol/L (02-11 @ 05:54)  Lactate, Blood: 0.7 mmol/L (02-10 @ 05:35)      proBNP:   Lipid Profile: 02-04 Chol 145 LDL 87 HDL 26<L> Trig 162<H>  HgA1c: 5.4 % (02-04 @ 21:37)    TSH:     CARDIAC MARKERS:   12 Feb 2018 02:21 Troponin 0.62 ng/mL / Creatine Kinase 61 U/L /  CKMB 2.7 ng/mL / CPK Mass Assay % x       05 Feb 2018 05:05 Troponin 0.73 ng/mL / Creatine Kinase 156 U/L /  CKMB 14.0 ng/mL / CPK Mass Assay % 9.0 %   05 Feb 2018 01:17 Troponin 0.80 ng/mL / Creatine Kinase 158 U/L /  CKMB 15.2 ng/mL / CPK Mass Assay % 9.6 %        TELEMETRY: 	    ECG:  	  RADIOLOGY:  OTHER: 	    PREVIOUS DIAGNOSTIC TESTING:    [ ] Echocardiogram:  [ ]  Catheterization:  [ ] Stress Test: Patient is a 77y old  Male who presents with a chief complaint of Shortness of Breath (04 Feb 2018 17:51)    Overnight events/Subjective:  Vasc sx replaced leaking SAUD ashraf and HD last night removed 2kg UF. Vanc level 19.5, vanc dose held. HFNC titrated down to FiO2 40%, 30 LPM. Pt feels ok. Denies CP, SOB, abd pain.  	  HPI:  This is a 77 year old man with past medical history of AAA 5.5cm s/p EVAAR repair with stents on 1/29 with incidental finding of Right Renal Neoplasm, previous history of Left Renal Neoplasm with Left Nephrectomy in 2008, Bladder Cancer, Known LBBB, HTN, HLD transferred from [facility] after initially presenting with SOB which began last night. Patient states that he was discharged from the hospital following AAA repair and recovering well.  He was walking with walker at home with no complaints.  After going to bed, he was awoken from his sleep complaining of SOB with no relief.  He went to [facility] wtih his SOB getting progressively worse with 1 episode of vomitus before going to hospital.  Patient denies fever, chills, recent sick contact, Chest pain, Abdominal pain, diarrhea.  At [place], paient with elevate d-dimers and transferred for possible PE.  At [facility], patient with  RICKI in V2-V4 with elevated Trops, and ADHF requiring Bipap. (04 Feb 2018 17:51)      MEDICATIONS  (STANDING):  argatroban Infusion 3 MICROgram(s)/kG/Min (12.24 mL/Hr) IV Continuous <Continuous>  aspirin enteric coated 81 milliGRAM(s) Oral daily  atorvastatin 80 milliGRAM(s) Oral at bedtime  docusate sodium 100 milliGRAM(s) Oral daily  hydrALAZINE 100 milliGRAM(s) Oral every 8 hours  isosorbide   dinitrate Tablet (ISORDIL) 10 milliGRAM(s) Oral three times a day  metoprolol succinate ER 50 milliGRAM(s) Oral daily  senna 1 Tablet(s) Oral at bedtime  sevelamer hydrochloride 1600 milliGRAM(s) Oral three times a day with meals    MEDICATIONS  (PRN):  diazepam    Tablet 10 milliGRAM(s) Oral two times a day PRN anxiety  polyethylene glycol 3350 17 Gram(s) Oral daily PRN Constipation      REVIEW OF SYSTEMS:  Otherwise, 10 point ROS done and otherwise negative.    PHYSICAL EXAM:  Vital Signs Last 24 Hrs  T(C): 37.1 (12 Feb 2018 05:00), Max: 37.1 (11 Feb 2018 20:00)  T(F): 98.8 (12 Feb 2018 05:00), Max: 98.8 (12 Feb 2018 05:00)  HR: 100 (12 Feb 2018 07:00) (90 - 102)  BP: --  BP(mean): --  RR: 33 (12 Feb 2018 07:00) (11 - 41)  SpO2: 98% (12 Feb 2018 07:00) (91% - 100%)  I&O's Summary    11 Feb 2018 07:01  -  12 Feb 2018 07:00  --------------------------------------------------------  IN: 1032.8 mL / OUT: 2000 mL / NET: -967.2 mL        Appearance: Normal	  HEENT:   Normal oral mucosa, PERRL, EOMI	  Lymphatic: No lymphadenopathy  Cardiovascular: Normal S1 S2, No JVD, No murmurs, No edema  Respiratory: Lungs clear to auscultation	  Psychiatry: A & O x 3, Mood & affect appropriate  Gastrointestinal:  Soft, Non-tender, + BS	  Skin: No rashes, No ecchymoses, No cyanosis	  Neurologic: Non-focal  Extremities: Normal range of motion, No clubbing, cyanosis or edema  Vascular: Peripheral pulses palpable 2+ bilaterally    LABS:	 	                        8.4    14.2  )-----------( 94       ( 12 Feb 2018 02:21 )             24.4     Auto Eosinophil # x     / Auto Eosinophil % x     / Auto Neutrophil # x     / Auto Neutrophil % x     / BANDS % x                            6.8    13.2  )-----------( 107      ( 11 Feb 2018 06:50 )             19.4     Auto Eosinophil # x     / Auto Eosinophil % x     / Auto Neutrophil # x     / Auto Neutrophil % x     / BANDS % x                            7.1    14.3  )-----------( 104      ( 11 Feb 2018 05:54 )             20.5     Auto Eosinophil # 0.0   / Auto Eosinophil % 0.2   / Auto Neutrophil # 13.2  / Auto Neutrophil % 92.4  / BANDS % x        INR: 2.63 ratio (02-12 @ 02:21)  INR: 2.99 ratio (02-11 @ 05:54)  INR: 2.56 ratio (02-10 @ 23:25)    02-12    134<L>  |  94<L>  |  44<H>  ----------------------------<  103<H>  4.0   |  23  |  4.95<H>  02-11    132<L>  |  91<L>  |  71<H>  ----------------------------<  150<H>  5.1   |  22  |  7.27<H>    Ca    8.3<L>      12 Feb 2018 02:21  Mg     2.2     02-12  Phos  4.5     02-12  TPro  6.5  /  Alb  3.0<L>  /  TBili  0.5  /  DBili  x   /  AST  50<H>  /  ALT  38  /  AlkPhos  67  02-12  TPro  6.0  /  Alb  2.9<L>  /  TBili  0.4  /  DBili  x   /  AST  40  /  ALT  30  /  AlkPhos  60  02-11    Lactate, Blood: 0.8 mmol/L (02-12 @ 02:21)  Lactate, Blood: 0.9 mmol/L (02-11 @ 05:54)  Lactate, Blood: 0.7 mmol/L (02-10 @ 05:35)      proBNP:   Lipid Profile: 02-04 Chol 145 LDL 87 HDL 26<L> Trig 162<H>  HgA1c: 5.4 % (02-04 @ 21:37)    TSH:     CARDIAC MARKERS:   12 Feb 2018 02:21 Troponin 0.62 ng/mL / Creatine Kinase 61 U/L /  CKMB 2.7 ng/mL / CPK Mass Assay % x       05 Feb 2018 05:05 Troponin 0.73 ng/mL / Creatine Kinase 156 U/L /  CKMB 14.0 ng/mL / CPK Mass Assay % 9.0 %   05 Feb 2018 01:17 Troponin 0.80 ng/mL / Creatine Kinase 158 U/L /  CKMB 15.2 ng/mL / CPK Mass Assay % 9.6 %        TELEMETRY: 	    ECG:  	  RADIOLOGY:  OTHER: 	    PREVIOUS DIAGNOSTIC TESTING:    [ ] Echocardiogram:  [ ]  Catheterization:  [ ] Stress Test: Patient is a 77y old  Male who presents with a chief complaint of Shortness of Breath (04 Feb 2018 17:51)    Overnight events/Subjective:  Vasc sx replaced leaking SAUD ashraf and HD last night removed 2kg UF. Vanc level 19.5, vanc dose held. HFNC titrated down to FiO2 40%, 30 LPM. Pt feels ok. Denies CP, SOB, abd pain.  	  HPI:  This is a 77 year old man with past medical history of AAA 5.5cm s/p EVAAR repair with stents on 1/29 with incidental finding of Right Renal Neoplasm, previous history of Left Renal Neoplasm with Left Nephrectomy in 2008, Bladder Cancer, Known LBBB, HTN, HLD transferred from [facility] after initially presenting with SOB which began last night. Patient states that he was discharged from the hospital following AAA repair and recovering well.  He was walking with walker at home with no complaints.  After going to bed, he was awoken from his sleep complaining of SOB with no relief.  He went to [facility] wtih his SOB getting progressively worse with 1 episode of vomitus before going to hospital.  Patient denies fever, chills, recent sick contact, Chest pain, Abdominal pain, diarrhea.  At [place], paient with elevate d-dimers and transferred for possible PE.  At [facility], patient with  RICKI in V2-V4 with elevated Trops, and ADHF requiring Bipap. (04 Feb 2018 17:51)      MEDICATIONS  (STANDING):  argatroban Infusion 3 MICROgram(s)/kG/Min (12.24 mL/Hr) IV Continuous <Continuous>  aspirin enteric coated 81 milliGRAM(s) Oral daily  atorvastatin 80 milliGRAM(s) Oral at bedtime  docusate sodium 100 milliGRAM(s) Oral daily  hydrALAZINE 100 milliGRAM(s) Oral every 8 hours  isosorbide   dinitrate Tablet (ISORDIL) 10 milliGRAM(s) Oral three times a day  metoprolol succinate ER 50 milliGRAM(s) Oral daily  senna 1 Tablet(s) Oral at bedtime  sevelamer hydrochloride 1600 milliGRAM(s) Oral three times a day with meals    MEDICATIONS  (PRN):  diazepam    Tablet 10 milliGRAM(s) Oral two times a day PRN anxiety  polyethylene glycol 3350 17 Gram(s) Oral daily PRN Constipation      REVIEW OF SYSTEMS:  Otherwise, 10 point ROS done and otherwise negative.    PHYSICAL EXAM:  Vital Signs Last 24 Hrs  T(C): 37.1 (12 Feb 2018 05:00), Max: 37.1 (11 Feb 2018 20:00)  T(F): 98.8 (12 Feb 2018 05:00), Max: 98.8 (12 Feb 2018 05:00)  HR: 100 (12 Feb 2018 07:00) (90 - 102)  BP: --  BP(mean): --  RR: 33 (12 Feb 2018 07:00) (11 - 41)  SpO2: 98% (12 Feb 2018 07:00) (91% - 100%)  I&O's Summary    11 Feb 2018 07:01  -  12 Feb 2018 07:00  --------------------------------------------------------  IN: 1032.8 mL / OUT: 2000 mL / NET: -967.2 mL        Appearance: Normal	  HEENT:   Normal oral mucosa, PERRL, EOMI	  Lymphatic: No lymphadenopathy  Cardiovascular: Normal S1 S2, No JVD, No murmurs, No edema  Respiratory: Lungs clear to auscultation	  Psychiatry: A & O x 3, Mood & affect appropriate  Gastrointestinal:  Soft, Non-tender, + BS	  Skin: No rashes, No ecchymoses, No cyanosis	  Neurologic: Non-focal  Extremities: Normal range of motion, No clubbing, cyanosis or edema  Vascular: Peripheral pulses palpable 2+ bilaterally    LABS:	 	                        8.4    14.2  )-----------( 94       ( 12 Feb 2018 02:21 )             24.4     Auto Eosinophil # x     / Auto Eosinophil % x     / Auto Neutrophil # x     / Auto Neutrophil % x     / BANDS % x                            6.8    13.2  )-----------( 107      ( 11 Feb 2018 06:50 )             19.4     Auto Eosinophil # x     / Auto Eosinophil % x     / Auto Neutrophil # x     / Auto Neutrophil % x     / BANDS % x                            7.1    14.3  )-----------( 104      ( 11 Feb 2018 05:54 )             20.5     Auto Eosinophil # 0.0   / Auto Eosinophil % 0.2   / Auto Neutrophil # 13.2  / Auto Neutrophil % 92.4  / BANDS % x        INR: 2.63 ratio (02-12 @ 02:21)  INR: 2.99 ratio (02-11 @ 05:54)  INR: 2.56 ratio (02-10 @ 23:25)    02-12    134<L>  |  94<L>  |  44<H>  ----------------------------<  103<H>  4.0   |  23  |  4.95<H>  02-11    132<L>  |  91<L>  |  71<H>  ----------------------------<  150<H>  5.1   |  22  |  7.27<H>    Ca    8.3<L>      12 Feb 2018 02:21  Mg     2.2     02-12  Phos  4.5     02-12  TPro  6.5  /  Alb  3.0<L>  /  TBili  0.5  /  DBili  x   /  AST  50<H>  /  ALT  38  /  AlkPhos  67  02-12  TPro  6.0  /  Alb  2.9<L>  /  TBili  0.4  /  DBili  x   /  AST  40  /  ALT  30  /  AlkPhos  60  02-11    Lactate, Blood: 0.8 mmol/L (02-12 @ 02:21)  Lactate, Blood: 0.9 mmol/L (02-11 @ 05:54)  Lactate, Blood: 0.7 mmol/L (02-10 @ 05:35)      proBNP:   Lipid Profile: 02-04 Chol 145 LDL 87 HDL 26<L> Trig 162<H>  HgA1c: 5.4 % (02-04 @ 21:37)    TSH:     CARDIAC MARKERS:   12 Feb 2018 02:21 Troponin 0.62 ng/mL / Creatine Kinase 61 U/L /  CKMB 2.7 ng/mL / CPK Mass Assay % x       05 Feb 2018 05:05 Troponin 0.73 ng/mL / Creatine Kinase 156 U/L /  CKMB 14.0 ng/mL / CPK Mass Assay % 9.0 %   05 Feb 2018 01:17 Troponin 0.80 ng/mL / Creatine Kinase 158 U/L /  CKMB 15.2 ng/mL / CPK Mass Assay % 9.6 %        TELEMETRY: 	    ECG:  	  RADIOLOGY:  OTHER:   	    PREVIOUS DIAGNOSTIC TESTING:    [ ] Echocardiogram:  [ ]  Catheterization:  [ ] Stress Test: Patient is a 77y old  Male who presents with a chief complaint of Shortness of Breath (04 Feb 2018 17:51)    Overnight events/Subjective:  Vasc sx replaced leaking SAUD ashraf and HD last night removed 2kg UF, received 1u pRBC. Vanc level 19.5, vanc dose held. HFNC titrated down to FiO2 40%, 30 LPM. Pt feels ok. Denies CP, SOB, abd pain.  	  HPI:  This is a 77 year old man with past medical history of AAA 5.5cm s/p EVAAR repair with stents on 1/29 with incidental finding of Right Renal Neoplasm, previous history of Left Renal Neoplasm with Left Nephrectomy in 2008, Bladder Cancer, Known LBBB, HTN, HLD transferred from [facility] after initially presenting with SOB which began last night. Patient states that he was discharged from the hospital following AAA repair and recovering well.  He was walking with walker at home with no complaints.  After going to bed, he was awoken from his sleep complaining of SOB with no relief.  He went to [facility] wtih his SOB getting progressively worse with 1 episode of vomitus before going to hospital.  Patient denies fever, chills, recent sick contact, Chest pain, Abdominal pain, diarrhea.  At [place], paient with elevate d-dimers and transferred for possible PE.  At [facility], patient with  RICKI in V2-V4 with elevated Trops, and ADHF requiring Bipap. (04 Feb 2018 17:51)      MEDICATIONS  (STANDING):  argatroban Infusion 3 MICROgram(s)/kG/Min (12.24 mL/Hr) IV Continuous <Continuous>  aspirin enteric coated 81 milliGRAM(s) Oral daily  atorvastatin 80 milliGRAM(s) Oral at bedtime  docusate sodium 100 milliGRAM(s) Oral daily  hydrALAZINE 100 milliGRAM(s) Oral every 8 hours  isosorbide   dinitrate Tablet (ISORDIL) 10 milliGRAM(s) Oral three times a day  metoprolol succinate ER 50 milliGRAM(s) Oral daily  senna 1 Tablet(s) Oral at bedtime  sevelamer hydrochloride 1600 milliGRAM(s) Oral three times a day with meals    MEDICATIONS  (PRN):  diazepam    Tablet 10 milliGRAM(s) Oral two times a day PRN anxiety  polyethylene glycol 3350 17 Gram(s) Oral daily PRN Constipation      REVIEW OF SYSTEMS:  Otherwise, 10 point ROS done and otherwise negative.    PHYSICAL EXAM:  Vital Signs Last 24 Hrs  T(C): 37.1 (12 Feb 2018 05:00), Max: 37.1 (11 Feb 2018 20:00)  T(F): 98.8 (12 Feb 2018 05:00), Max: 98.8 (12 Feb 2018 05:00)  HR: 100 (12 Feb 2018 07:00) (90 - 102)  BP: --  BP(mean): --  RR: 33 (12 Feb 2018 07:00) (11 - 41)  SpO2: 98% (12 Feb 2018 07:00) (91% - 100%)  I&O's Summary    11 Feb 2018 07:01  -  12 Feb 2018 07:00  --------------------------------------------------------  IN: 1032.8 mL / OUT: 2000 mL / NET: -967.2 mL        Appearance: Normal	  HEENT:   Normal oral mucosa, PERRL, EOMI	  Lymphatic: No lymphadenopathy  Cardiovascular: Normal S1 S2, No JVD, No murmurs, No edema  Respiratory: Lungs clear to auscultation	  Psychiatry: A & O x 3, Mood & affect appropriate  Gastrointestinal:  Soft, Non-tender, + BS	  Skin: No rashes, No ecchymoses, No cyanosis	  Neurologic: Non-focal  Extremities: Normal range of motion, No clubbing, cyanosis or edema  Vascular: Peripheral pulses palpable 2+ bilaterally    LABS:	 	                        8.4    14.2  )-----------( 94       ( 12 Feb 2018 02:21 )             24.4     Auto Eosinophil # x     / Auto Eosinophil % x     / Auto Neutrophil # x     / Auto Neutrophil % x     / BANDS % x                            6.8    13.2  )-----------( 107      ( 11 Feb 2018 06:50 )             19.4     Auto Eosinophil # x     / Auto Eosinophil % x     / Auto Neutrophil # x     / Auto Neutrophil % x     / BANDS % x                            7.1    14.3  )-----------( 104      ( 11 Feb 2018 05:54 )             20.5     Auto Eosinophil # 0.0   / Auto Eosinophil % 0.2   / Auto Neutrophil # 13.2  / Auto Neutrophil % 92.4  / BANDS % x        INR: 2.63 ratio (02-12 @ 02:21)  INR: 2.99 ratio (02-11 @ 05:54)  INR: 2.56 ratio (02-10 @ 23:25)    02-12    134<L>  |  94<L>  |  44<H>  ----------------------------<  103<H>  4.0   |  23  |  4.95<H>  02-11    132<L>  |  91<L>  |  71<H>  ----------------------------<  150<H>  5.1   |  22  |  7.27<H>    Ca    8.3<L>      12 Feb 2018 02:21  Mg     2.2     02-12  Phos  4.5     02-12  TPro  6.5  /  Alb  3.0<L>  /  TBili  0.5  /  DBili  x   /  AST  50<H>  /  ALT  38  /  AlkPhos  67  02-12  TPro  6.0  /  Alb  2.9<L>  /  TBili  0.4  /  DBili  x   /  AST  40  /  ALT  30  /  AlkPhos  60  02-11    Lactate, Blood: 0.8 mmol/L (02-12 @ 02:21)  Lactate, Blood: 0.9 mmol/L (02-11 @ 05:54)  Lactate, Blood: 0.7 mmol/L (02-10 @ 05:35)      proBNP:   Lipid Profile: 02-04 Chol 145 LDL 87 HDL 26<L> Trig 162<H>  HgA1c: 5.4 % (02-04 @ 21:37)    TSH:     CARDIAC MARKERS:   12 Feb 2018 02:21 Troponin 0.62 ng/mL / Creatine Kinase 61 U/L /  CKMB 2.7 ng/mL / CPK Mass Assay % x       05 Feb 2018 05:05 Troponin 0.73 ng/mL / Creatine Kinase 156 U/L /  CKMB 14.0 ng/mL / CPK Mass Assay % 9.0 %   05 Feb 2018 01:17 Troponin 0.80 ng/mL / Creatine Kinase 158 U/L /  CKMB 15.2 ng/mL / CPK Mass Assay % 9.6 %        TELEMETRY: 	    ECG:  	  RADIOLOGY:  OTHER:   	    PREVIOUS DIAGNOSTIC TESTING:    [ ] Echocardiogram:  [ ]  Catheterization:  [ ] Stress Test: Patient is a 77y old  Male who presents with a chief complaint of Shortness of Breath (04 Feb 2018 17:51)    Overnight events/Subjective:  Vasc sx replaced leaking SAUD ashraf and HD last night removed 2kg UF, received 1u pRBC. Vanc level 19.5, vanc dose held. HFNC titrated down to FiO2 40%, 30 LPM. Pt feels ok. Denies CP, SOB, abd pain. Is open to talking to palliative care for emotional support and GOC.  	  HPI:  This is a 77 year old man with past medical history of AAA 5.5cm s/p EVAAR repair with stents on 1/29 with incidental finding of Right Renal Neoplasm, previous history of Left Renal Neoplasm with Left Nephrectomy in 2008, Bladder Cancer, Known LBBB, HTN, HLD transferred from [facility] after initially presenting with SOB which began last night. Patient states that he was discharged from the hospital following AAA repair and recovering well.  He was walking with walker at home with no complaints.  After going to bed, he was awoken from his sleep complaining of SOB with no relief.  He went to [facility] wtih his SOB getting progressively worse with 1 episode of vomitus before going to hospital.  Patient denies fever, chills, recent sick contact, Chest pain, Abdominal pain, diarrhea.  At [place], paient with elevate d-dimers and transferred for possible PE.  At [facility], patient with  RICKI in V2-V4 with elevated Trops, and ADHF requiring Bipap. (04 Feb 2018 17:51)      MEDICATIONS  (STANDING):  argatroban Infusion 3 MICROgram(s)/kG/Min (12.24 mL/Hr) IV Continuous <Continuous>  aspirin enteric coated 81 milliGRAM(s) Oral daily  atorvastatin 80 milliGRAM(s) Oral at bedtime  docusate sodium 100 milliGRAM(s) Oral daily  hydrALAZINE 100 milliGRAM(s) Oral every 8 hours  isosorbide   dinitrate Tablet (ISORDIL) 10 milliGRAM(s) Oral three times a day  metoprolol succinate ER 50 milliGRAM(s) Oral daily  senna 1 Tablet(s) Oral at bedtime  sevelamer hydrochloride 1600 milliGRAM(s) Oral three times a day with meals    MEDICATIONS  (PRN):  diazepam    Tablet 10 milliGRAM(s) Oral two times a day PRN anxiety  polyethylene glycol 3350 17 Gram(s) Oral daily PRN Constipation      REVIEW OF SYSTEMS:  Otherwise, 10 point ROS done and otherwise negative.    PHYSICAL EXAM:  Vital Signs Last 24 Hrs  T(C): 37.1 (12 Feb 2018 05:00), Max: 37.1 (11 Feb 2018 20:00)  T(F): 98.8 (12 Feb 2018 05:00), Max: 98.8 (12 Feb 2018 05:00)  HR: 100 (12 Feb 2018 07:00) (90 - 102)  BP: --  BP(mean): --  RR: 33 (12 Feb 2018 07:00) (11 - 41)  SpO2: 98% (12 Feb 2018 07:00) (91% - 100%)  I&O's Summary    11 Feb 2018 07:01  -  12 Feb 2018 07:00  --------------------------------------------------------  IN: 1032.8 mL / OUT: 2000 mL / NET: -967.2 mL        Appearance: Normal	  HEENT:   Normal oral mucosa, PERRL, EOMI	  Lymphatic: No lymphadenopathy  Cardiovascular: Normal S1 S2, No JVD, No murmurs, No edema  Respiratory: Rales on exam  Psychiatry: A & O x 2 (person, place), dysthymic mood  Gastrointestinal:  Soft, Non-tender, + BS	  Skin: No rashes, No ecchymoses, No cyanosis	  Neurologic: Non-focal  Extremities: Normal range of motion, No clubbing, cyanosis or edema  Vascular: Warm extremities    LABS:	 	                        8.4    14.2  )-----------( 94       ( 12 Feb 2018 02:21 )             24.4     Auto Eosinophil # x     / Auto Eosinophil % x     / Auto Neutrophil # x     / Auto Neutrophil % x     / BANDS % x                            6.8    13.2  )-----------( 107      ( 11 Feb 2018 06:50 )             19.4     Auto Eosinophil # x     / Auto Eosinophil % x     / Auto Neutrophil # x     / Auto Neutrophil % x     / BANDS % x                            7.1    14.3  )-----------( 104      ( 11 Feb 2018 05:54 )             20.5     Auto Eosinophil # 0.0   / Auto Eosinophil % 0.2   / Auto Neutrophil # 13.2  / Auto Neutrophil % 92.4  / BANDS % x        INR: 2.63 ratio (02-12 @ 02:21)  INR: 2.99 ratio (02-11 @ 05:54)  INR: 2.56 ratio (02-10 @ 23:25)    02-12    134<L>  |  94<L>  |  44<H>  ----------------------------<  103<H>  4.0   |  23  |  4.95<H>  02-11    132<L>  |  91<L>  |  71<H>  ----------------------------<  150<H>  5.1   |  22  |  7.27<H>    Ca    8.3<L>      12 Feb 2018 02:21  Mg     2.2     02-12  Phos  4.5     02-12  TPro  6.5  /  Alb  3.0<L>  /  TBili  0.5  /  DBili  x   /  AST  50<H>  /  ALT  38  /  AlkPhos  67  02-12  TPro  6.0  /  Alb  2.9<L>  /  TBili  0.4  /  DBili  x   /  AST  40  /  ALT  30  /  AlkPhos  60  02-11    Lactate, Blood: 0.8 mmol/L (02-12 @ 02:21)  Lactate, Blood: 0.9 mmol/L (02-11 @ 05:54)  Lactate, Blood: 0.7 mmol/L (02-10 @ 05:35)      proBNP:   Lipid Profile: 02-04 Chol 145 LDL 87 HDL 26<L> Trig 162<H>  HgA1c: 5.4 % (02-04 @ 21:37)    TSH:     CARDIAC MARKERS:   12 Feb 2018 02:21 Troponin 0.62 ng/mL / Creatine Kinase 61 U/L /  CKMB 2.7 ng/mL / CPK Mass Assay % x       05 Feb 2018 05:05 Troponin 0.73 ng/mL / Creatine Kinase 156 U/L /  CKMB 14.0 ng/mL / CPK Mass Assay % 9.0 %   05 Feb 2018 01:17 Troponin 0.80 ng/mL / Creatine Kinase 158 U/L /  CKMB 15.2 ng/mL / CPK Mass Assay % 9.6 %        TELEMETRY: 	    ECG:  	  RADIOLOGY:  OTHER:  CXR: inc    	    PREVIOUS DIAGNOSTIC TESTING:    [ ] Echocardiogram:  [ ]  Catheterization:  [ ] Stress Test: Patient is a 77y old  Male who presents with a chief complaint of Shortness of Breath (04 Feb 2018 17:51)    Overnight events/Subjective:  Vasc sx replaced leaking SAUD ashraf and HD last night removed 2kg UF, received 1u pRBC. Vanc level 19.5, vanc dose held. HFNC titrated down to FiO2 40%, 30 LPM. Pt feels ok. Denies CP, SOB, abd pain. Is open to talking to palliative care for emotional support and GOC.  	  HPI:  This is a 77 year old man with past medical history of AAA 5.5cm s/p EVAAR repair with stents on 1/29 with incidental finding of Right Renal Neoplasm, previous history of Left Renal Neoplasm with Left Nephrectomy in 2008, Bladder Cancer, Known LBBB, HTN, HLD transferred from [facility] after initially presenting with SOB which began last night. Patient states that he was discharged from the hospital following AAA repair and recovering well.  He was walking with walker at home with no complaints.  After going to bed, he was awoken from his sleep complaining of SOB with no relief.  He went to [facility] wtih his SOB getting progressively worse with 1 episode of vomitus before going to hospital.  Patient denies fever, chills, recent sick contact, Chest pain, Abdominal pain, diarrhea.  At [place], paient with elevate d-dimers and transferred for possible PE.  At [facility], patient with  RICKI in V2-V4 with elevated Trops, and ADHF requiring Bipap. (04 Feb 2018 17:51)      MEDICATIONS  (STANDING):  argatroban Infusion 3 MICROgram(s)/kG/Min (12.24 mL/Hr) IV Continuous <Continuous>  aspirin enteric coated 81 milliGRAM(s) Oral daily  atorvastatin 80 milliGRAM(s) Oral at bedtime  docusate sodium 100 milliGRAM(s) Oral daily  hydrALAZINE 100 milliGRAM(s) Oral every 8 hours  isosorbide   dinitrate Tablet (ISORDIL) 10 milliGRAM(s) Oral three times a day  metoprolol succinate ER 50 milliGRAM(s) Oral daily  senna 1 Tablet(s) Oral at bedtime  sevelamer hydrochloride 1600 milliGRAM(s) Oral three times a day with meals    MEDICATIONS  (PRN):  diazepam    Tablet 10 milliGRAM(s) Oral two times a day PRN anxiety  polyethylene glycol 3350 17 Gram(s) Oral daily PRN Constipation      REVIEW OF SYSTEMS:  Otherwise, 10 point ROS done and otherwise negative.    PHYSICAL EXAM:  Vital Signs Last 24 Hrs  T(C): 37.1 (12 Feb 2018 05:00), Max: 37.1 (11 Feb 2018 20:00)  T(F): 98.8 (12 Feb 2018 05:00), Max: 98.8 (12 Feb 2018 05:00)  HR: 100 (12 Feb 2018 07:00) (90 - 102)  BP: --  BP(mean): --  RR: 33 (12 Feb 2018 07:00) (11 - 41)  SpO2: 98% (12 Feb 2018 07:00) (91% - 100%)  I&O's Summary    11 Feb 2018 07:01  -  12 Feb 2018 07:00  --------------------------------------------------------  IN: 1032.8 mL / OUT: 2000 mL / NET: -967.2 mL        Appearance: Normal	  HEENT:   Normal oral mucosa, PERRL, EOMI	  Lymphatic: No lymphadenopathy  Cardiovascular: Normal S1 S2, No JVD, No murmurs, No edema  Respiratory: Rales on exam  Psychiatry: A & O x 2 (person, place), dysthymic mood  Gastrointestinal:  Soft, Non-tender, + BS	  Skin: No rashes, No ecchymoses, No cyanosis	  Neurologic: Non-focal  Extremities: Normal range of motion, No clubbing, cyanosis or edema  Vascular: Warm extremities    LABS:	 	                        8.4    14.2  )-----------( 94       ( 12 Feb 2018 02:21 )             24.4     Auto Eosinophil # x     / Auto Eosinophil % x     / Auto Neutrophil # x     / Auto Neutrophil % x     / BANDS % x                            6.8    13.2  )-----------( 107      ( 11 Feb 2018 06:50 )             19.4     Auto Eosinophil # x     / Auto Eosinophil % x     / Auto Neutrophil # x     / Auto Neutrophil % x     / BANDS % x                            7.1    14.3  )-----------( 104      ( 11 Feb 2018 05:54 )             20.5     Auto Eosinophil # 0.0   / Auto Eosinophil % 0.2   / Auto Neutrophil # 13.2  / Auto Neutrophil % 92.4  / BANDS % x        INR: 2.63 ratio (02-12 @ 02:21)  INR: 2.99 ratio (02-11 @ 05:54)  INR: 2.56 ratio (02-10 @ 23:25)    02-12    134<L>  |  94<L>  |  44<H>  ----------------------------<  103<H>  4.0   |  23  |  4.95<H>  02-11    132<L>  |  91<L>  |  71<H>  ----------------------------<  150<H>  5.1   |  22  |  7.27<H>    Ca    8.3<L>      12 Feb 2018 02:21  Mg     2.2     02-12  Phos  4.5     02-12  TPro  6.5  /  Alb  3.0<L>  /  TBili  0.5  /  DBili  x   /  AST  50<H>  /  ALT  38  /  AlkPhos  67  02-12  TPro  6.0  /  Alb  2.9<L>  /  TBili  0.4  /  DBili  x   /  AST  40  /  ALT  30  /  AlkPhos  60  02-11    Lactate, Blood: 0.8 mmol/L (02-12 @ 02:21)  Lactate, Blood: 0.9 mmol/L (02-11 @ 05:54)  Lactate, Blood: 0.7 mmol/L (02-10 @ 05:35)      proBNP:   Lipid Profile: 02-04 Chol 145 LDL 87 HDL 26<L> Trig 162<H>  HgA1c: 5.4 % (02-04 @ 21:37)    TSH:     CARDIAC MARKERS:   12 Feb 2018 02:21 Troponin 0.62 ng/mL / Creatine Kinase 61 U/L /  CKMB 2.7 ng/mL / CPK Mass Assay % x       05 Feb 2018 05:05 Troponin 0.73 ng/mL / Creatine Kinase 156 U/L /  CKMB 14.0 ng/mL / CPK Mass Assay % 9.0 %   05 Feb 2018 01:17 Troponin 0.80 ng/mL / Creatine Kinase 158 U/L /  CKMB 15.2 ng/mL / CPK Mass Assay % 9.6 %        TELEMETRY: 	    ECG:  	  RADIOLOGY:  OTHER:  CXR:   	    PREVIOUS DIAGNOSTIC TESTING:    [ ] Echocardiogram:  [ ]  Catheterization:  [ ] Stress Test:

## 2018-02-12 NOTE — CHART NOTE - NSCHARTNOTEFT_GEN_A_CORE
Source: Patient [ ]    Family [x ] Daughter and other family at bedside   other [x ] Medical records, NP; Pt seen for malnutrition follow up. Per chart, 76 y/o male with PMH of left renal neoplasm with left nephrectomy in 2008, Bladder Cancer, Known LBBB, HTN, HLD, AAA s/p EVAAR repair with stents on 1/29 with incidental finding of right renal neoplasm, admitted with NSTEMI, hypoxic respiratory failure 2/2 pulmonary edema 2/2 ADHF and PNA, DONNIE on CKD3 2/2 acute right renal artery stenosis, s/p HD yesterday, possible palliative for GOC.    Diet : Renal Nepro 2 x day 1200cc fluid restriction       Patient reports [ ] nausea  [ ] vomiting [ ] diarrhea [ ] constipation  [ ]chewing problems [ ] swallowing issues  [ ] other: no acute GI distress noted, +BM 2/11      PO intake:  < 50% [ x] 50-75% [ ]   % [ ]  other :     Source for PO intake [ ] Patient [ x] family [ ] chart [ ] staff [ ] other: Pt remains with poor appetite/po intakes, too weak to chew per daughter, drinking Nepro at time of visit.       Current Weight: 162.2 pounds (2/12) - wt increase noted 2/2 fluid shift from 149 pounds (2/4)   % Weight Change    Pertinent Medications: MEDICATIONS  (STANDING):  argatroban Infusion 3 MICROgram(s)/kG/Min (12.24 mL/Hr) IV Continuous <Continuous>  aspirin enteric coated 81 milliGRAM(s) Oral daily  atorvastatin 80 milliGRAM(s) Oral at bedtime  docusate sodium 100 milliGRAM(s) Oral daily  hydrALAZINE 100 milliGRAM(s) Oral every 8 hours  isosorbide   dinitrate Tablet (ISORDIL) 10 milliGRAM(s) Oral three times a day  metoprolol succinate ER 50 milliGRAM(s) Oral daily  senna 1 Tablet(s) Oral at bedtime  sevelamer hydrochloride 1600 milliGRAM(s) Oral three times a day with meals    MEDICATIONS  (PRN):  diazepam    Tablet 10 milliGRAM(s) Oral two times a day PRN anxiety  polyethylene glycol 3350 17 Gram(s) Oral daily PRN Constipation    Pertinent Labs:  02-12 Na134 mmol/L<L> Glu 103 mg/dL<H> K+ 4.0 mmol/L Cr  4.95 mg/dL<H> BUN 44 mg/dL<H> Phos 4.5 mg/dL Alb 3.0 g/dL<L> PAB n/a         Skin: +1 generalized edema, skin no pressure ulcers    Estimated Needs:   [ x] no change since previous assessment  [ ] recalculated:      Previous Nutrition Diagnosis:     [ ] Inadequate Energy Intake [ ]Inadequate Oral Intake [ ] Excessive Energy Intake     [ ] Underweight [ ] Increased Nutrient Needs [ ] Overweight/Obesity     [ ] Altered GI Function [ ] Unintended Weight Loss [ ] Food & Nutrition Related Knowledge Deficit [x ] Malnutrition - severe         Nutrition Diagnosis is [ x] ongoing  [ ] resolved [ ] not applicable          New Nutrition Diagnosis: [x ] not applicable      Recommend    [ x] Change Diet To: Recommend diet change to Mechanical Soft Renal diet, fluid per team    [x ] Nutrition Supplement: Recommend Nepro 2 x day    [ ] Nutrition Support    [ x] Other: Educated pt's daughter on Renal HD diet per daughter's request. Identified food sources high in sodium, potassium and phosphorus. Discussed importance of consuming adequate amount of high biological value proteins, healthy balanced meals, and cooking techniques discussed as family wants to bring foods from home.       Monitoring and Evaluation:     [ x] PO intake [x ] Tolerance to diet prescription [ x] weights [ ] follow up per protocol    [ ] other:

## 2018-02-12 NOTE — PROGRESS NOTE ADULT - SUBJECTIVE AND OBJECTIVE BOX
[facility] Division of Kidney Diseases & Hypertension  FOLLOW UP NOTE  --------------------------------------------------------------------------------  HPI: 77 year old man with past medical history of AAA 5.5cm s/p EVAAR repair on 1/29 with incidental finding of Right Neoplasm, previous history of Left Neoplasm with Left Nephrectomy in 2009 transferred from [facility] for worsening SOB. Patient was found to have DONNIE and fluid overload and was initiated on CVVHDF on 2/4/17 and was stopped on 2/6/18.  Had rapid afib with HD.  Ultrasound suggestive of high grade right renal artery narrowing.  Patient had CT angiogram over weekend which showed moderate to severe right renal artery stenosis. Currently patient is still on high flow nasal cannula; denies complains of CP, abdominal pain, nausea, feels tired.    PAST HISTORY  --------------------------------------------------------------------------------  No significant changes to PMH, PSH, FHx, SHx, unless otherwise noted    ALLERGIES & MEDICATIONS  --------------------------------------------------------------------------------  Allergies    Cipro (Other)  Levaquin (Other)  penicillin (Hives)    Intolerances      Standing Inpatient Medications  argatroban Infusion 3 MICROgram(s)/kG/Min IV Continuous <Continuous>  aspirin enteric coated 81 milliGRAM(s) Oral daily  atorvastatin 80 milliGRAM(s) Oral at bedtime  docusate sodium 100 milliGRAM(s) Oral daily  hydrALAZINE 100 milliGRAM(s) Oral every 8 hours  isosorbide   dinitrate Tablet (ISORDIL) 10 milliGRAM(s) Oral three times a day  metoprolol succinate ER 50 milliGRAM(s) Oral daily  senna 1 Tablet(s) Oral at bedtime  sevelamer hydrochloride 1600 milliGRAM(s) Oral three times a day with meals    PRN Inpatient Medications  diazepam    Tablet 10 milliGRAM(s) Oral two times a day PRN  polyethylene glycol 3350 17 Gram(s) Oral daily PRN      REVIEW OF SYSTEMS  --------------------------------------------------------------------------------  as noted above    VITALS/PHYSICAL EXAM  --------------------------------------------------------------------------------  T(C): 37 (02-12-18 @ 08:00), Max: 37.1 (02-11-18 @ 20:00)  HR: 92 (02-12-18 @ 10:00) (88 - 102)  BP: 128/62  RR: 31 (02-12-18 @ 10:00) (11 - 41)  SpO2: 97% (02-12-18 @ 10:00) (91% - 100%)  Wt(kg): --        02-11-18 @ 07:01  -  02-12-18 @ 07:00  --------------------------------------------------------  IN: 1032.8 mL / OUT: 2000 mL / NET: -967.2 mL    02-12-18 @ 07:01  -  02-12-18 @ 10:02  --------------------------------------------------------  IN: 36.6 mL / OUT: 0 mL / NET: 36.6 mL      Physical Exam:  	Gen: NAD   	HEENT: anicteric  	Pulm: wheezes and crackles bilaterally  	CV: RRR s1 and s2  	Abd: soft, nontender, nondistended  	LE: no B/L pedal edema present.   	Skin: Warm and dry   	Vascular access: right non tunneled HD catheter present dry crusted blood on dressing.    LABS/STUDIES  --------------------------------------------------------------------------------              8.4    14.2  >-----------<  94       [02-12-18 @ 02:21]              24.4     134  |  94  |  44  ----------------------------<  103      [02-12-18 @ 02:21]  4.0   |  23  |  4.95        Ca     8.3     [02-12-18 @ 02:21]      Mg     2.2     [02-12-18 @ 02:21]      Phos  4.5     [02-12-18 @ 02:21]    TPro  6.5  /  Alb  3.0  /  TBili  0.5  /  DBili  x   /  AST  50  /  ALT  38  /  AlkPhos  67  [02-12-18 @ 02:21]    PT/INR: PT 29.0 , INR 2.63       [02-12-18 @ 02:21]  PTT: 71.5       [02-12-18 @ 02:21]    Troponin 0.62      [02-12-18 @ 02:21]  CK 61      [02-12-18 @ 02:21]    Creatinine Trend:  SCr 4.95 [02-12 @ 02:21]  SCr 7.27 [02-11 @ 05:54]  SCr 8.95 [02-10 @ 05:35]  SCr 8.08 [02-09 @ 16:42]  SCr 6.61 [02-09 @ 03:18]          HBsAb <3.0      [02-07-18 @ 23:02]  HBsAg Nonreact      [02-07-18 @ 23:02]  HCV 0.11, Nonreact      [02-07-18 @ 23:02]    C3 Complement 157      [02-05-18 @ 21:15]  C4 Complement 35      [02-05-18 @ 21:15]

## 2018-02-12 NOTE — PROGRESS NOTE ADULT - ASSESSMENT
77 year old man with past medical history of AAA 5.5cm s/p EVAAR repair on 1/29 with incidental finding of Right Neoplasm, previous history of Left Neoplasm with Left Nephrectomy in 2009, Bladder Cancer, Known LBBB, HTN, HLD presents with hypoxic respiratory failure 2/2 to pulmonary edema  2/2 ADHF + multifocal PNA in the setting of DONNIE on CKD with evidence of severe renal artery stenosis and TVD on cath      1. AAA s/p EVAR on 1/29" Evidence of renal artery stenosis and renal failure s/p EVAR. Will re- attempt intervention tomorrow to treat the stenosis.       #CV: Pulmonary edema 2/2 to ADHF + volume overload in the setting of acute renal failure. Now resolved s/p dialysis   LHC with triple vessel disease, PCI planned after renal artery intervention. Continue with aspirin, bb, statin  TTE with evidence of severe LV dysfunction (EF 25-30%) with moderate AS, Given LHC findings, likely ischemic cardiomyopathy.   -c/w hydralazine, isordil, low dose bb  for preload/afterload reduction. Continue to monitor oxy sat  -c/w ASA  -Afib with RVR: new onset, in the setting of sepsis, HF. No repeat episodes Continue with BB, uptitrate as tolerated. 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 Given recent hospitalization, will treat for HCAP for total of 7 days. Blood cx NGTD  Wean off oxygen as tolerated    #Renal: DONNIE on CKD in the setting of recent surgery, iv contrast for imaging. Renal US significant for high grade stenosis at the origin of the renal artery. Plan for the Cath of the renal artery with likely stenting. Renal artery could not be engaged, unclear relation of stent structure to artery. Baseline SCr 1.2 - 1.3   CTA aorta shows stent extends past R renal artery to SMA  -Volume overloaded and K elevated although specimen mildly hemolyzed. Monitor Scr, electrolytes, UOP.  -f/u with interventional cardiology Dr. Heriberto Garg when intervene percutaneously for WOOD    #Heme/Onc: Spoke with pt's outpatient urologist Dr. Santiago. He is aware of the lesion on pt's right kidney but says given its size, does not require intervention at this time and states we should prioritize treating potential ischemic disease. He will continue to monitor the mass as outpatient.   HIT score ~4-5. Intermediate probability of HIT  -Hematology following, pt will need outpatient hematology follow up at [facility] [phone] and at least 4 weeks of anticoagulation  -HIT ab positive. Awaiting PRADEEP for confirmation, c/w argatroban gtt  -trend LFTs every few days on Argatraban  -when PLT >150k, can bridge to coumadin over 5 days, INR 2-3    #ID: Pt initially presented with severe sepsis likely 2/2 to PNA with signs of end organ dysfunction (donnie, elevated trops) now improving s/p abx. WBC downtrending.  -Aztreonam (2/5 - 2/6)  -Continue to trend, dose vanc by level (2/5 - )    #GI: DASH diet, no issues  -trend LFTs on Argatraban    DVT ppx: argatraban 77 year old man with past medical history of AAA 5.5cm s/p EVAAR repair on 1/29 with incidental finding of Right Neoplasm, previous history of Left Neoplasm with Left Nephrectomy in 2009, Bladder Cancer, Known LBBB, HTN, HLD presents with hypoxic respiratory failure 2/2 to pulmonary edema  2/2 ADHF + multifocal PNA in the setting of DONNIE on CKD with evidence of severe renal artery stenosis and TVD on cath    1. AAA s/p EVAR on 1/29: Evidence of renal artery stenosis and renal failure s/p EVAR. Will re- attempt intervention tomorrow to treat the stenosis.     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 On argatroban.     4. CAD: triple vessel disease, PCI planned after renal artery intervention.  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 Afib: CHADSVASC score of 4, On Argatraban    Will discuss with nephrology regarding the time of dialysis, to ensure that patient's volume status will allow him to lie flat and tolerate the procedure.

## 2018-02-12 NOTE — PROGRESS NOTE ADULT - ASSESSMENT
77 year old man with past medical history of AAA 5.5cm s/p EVAAR repair on 1/29 with incidental finding of Right Neoplasm, previous history of Left Neoplasm with Left Nephrectomy in 2009, Bladder Cancer, Known LBBB, HTN, HLD presents with hypoxic respiratory failure 2/2 to pulmonary edema  2/2 ADHF + multifocal PNA in the setting of DONNIE on CKD with evidence of severe renal artery stenosis and TVD on cath    #Neuro: AOx2-3  Confused at times, likely delirium in the setting of hospital stay. Able to easily re-orient    #CV: Pulmonary edema 2/2 to ADHF + volume overload in the setting of acute renal failure. Now resolved s/p dialysis   LHC with triple vessel disease, PCI planned after renal artery intervention. Continue with aspirin, bb, statin  TTE with evidence of severe LV dysfunction, Given LHC findings, likely ischemic cardiomyopathy.   -c/w hydralazine, isordil, low dose bb  for preload/afterload reduction. Continue to monitor oxy sat  -c/w ASA  -Afib with RVR: new onset, in the setting of sepsis, HF. No repeat episodes Continue with BB, uptitrate as tolerated. 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 Given recent hospitalization, will treat for HCAP for total of 7 days. Blood cx NGTD  Wean off oxygen as tolerated    #Renal: DONNIE on CKD in the setting of recent surgery, iv contrast for imaging. Renal US significant for high grade stenosis at the origin of the renal artery. Plan for the Cath of the renal artery with likely stenting. Renal artery could not be engaged, unclear relation of stent structure to artery.   CTA aorta shows stent extends past R renal artery to SMA  -Volume overloaded and K elevated although specimen mildly hemolyzed. Monitor Scr, electrolytes, UOP.  -f/u with interventional cardiology Dr. Heriberto Garg when intervene percutaneously for WOOD    #Heme/Onc: Spoke with pt's outpatient urologist Dr. Santiago. He is aware of the lesion on pt's right kidney but says given its size, does not require intervention at this time and states we should prioritize treating potential ischemic disease. He will continue to monitor the mass as outpatient.   HIT score ~4-5. 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 Awaiting PRADEEP for confirmation, c/w argatroban gtt  -trend LFTs every few days on Argatraban  -when PLT >150k, can bridge to coumadin over 5 days, INR 2-3    #ID: Pt initially presented with severe sepsis likely 2/2 to PNA with signs of end organ dysfunction (donnie, elevated trops) now improving s/p abx. WBC downtrending. Vanc level high, held dose. -Continue to trend, dose vanc by level    #GI: DASH diet, no issues  -trend LFTs on Argatraban    DVT ppx: argatraban 77 year old man with past medical history of AAA 5.5cm s/p EVAAR repair on 1/29 with incidental finding of Right Neoplasm, previous history of Left Neoplasm with Left Nephrectomy in 2009, Bladder Cancer, Known LBBB, HTN, HLD presents with hypoxic respiratory failure 2/2 to pulmonary edema  2/2 ADHF + multifocal PNA in the setting of DONNIE on CKD with evidence of severe renal artery stenosis and TVD on cath    #Neuro: AOx2-3  Confused at times, likely delirium in the setting of hospital stay. Able to easily re-orient    #CV: Pulmonary edema 2/2 to ADHF + volume overload in the setting of acute renal failure. Now resolved s/p dialysis   LHC with triple vessel disease, PCI planned after renal artery intervention. Continue with aspirin, bb, statin  TTE with evidence of severe LV dysfunction, Given LHC findings, likely ischemic cardiomyopathy.   -c/w hydralazine, isordil, low dose bb  for preload/afterload reduction. Continue to monitor oxy sat  -c/w ASA  -Afib with RVR: new onset, in the setting of sepsis, HF. No repeat episodes Continue with BB, uptitrate as tolerated. 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 Given recent hospitalization, will treat for HCAP for total of 7 days. Blood cx NGTD  Wean off oxygen as tolerated    #Renal: DONNIE on CKD in the setting of recent surgery, iv contrast for imaging. Renal US significant for high grade stenosis at the origin of the renal artery. Plan for the Cath of the renal artery with likely stenting. Renal artery could not be engaged, unclear relation of stent structure to artery. Baseline SCr 1.2 - 1.3   CTA aorta shows stent extends past R renal artery to SMA  -Volume overloaded and K elevated although specimen mildly hemolyzed. Monitor Scr, electrolytes, UOP.  -f/u with interventional cardiology Dr. Heriberto Garg when intervene percutaneously for WOOD    #Heme/Onc: Spoke with pt's outpatient urologist Dr. Santiago. He is aware of the lesion on pt's right kidney but says given its size, does not require intervention at this time and states we should prioritize treating potential ischemic disease. He will continue to monitor the mass as outpatient.   HIT score ~4-5. 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 Awaiting PRADEEP for confirmation, c/w argatroban gtt  -trend LFTs every few days on Argatraban  -when PLT >150k, can bridge to coumadin over 5 days, INR 2-3    #ID: Pt initially presented with severe sepsis likely 2/2 to PNA with signs of end organ dysfunction (donnie, elevated trops) now improving s/p abx. WBC downtrending. Vanc level high, held dose. -Continue to trend, dose vanc by level    #GI: DASH diet, no issues  -trend LFTs on Argatraban    DVT ppx: argatraban 77 year old man with past medical history of AAA 5.5cm s/p EVAAR repair on 1/29 with incidental finding of Right Neoplasm, previous history of Left Neoplasm with Left Nephrectomy in 2009, Bladder Cancer, Known LBBB, HTN, HLD presents with hypoxic respiratory failure 2/2 to pulmonary edema  2/2 ADHF + multifocal PNA in the setting of DONNIE on CKD with evidence of severe renal artery stenosis and TVD on cath    #Neuro: AOx2-3  Confused at times, likely delirium in the setting of hospital stay. Able to easily re-orient    #CV: Pulmonary edema 2/2 to ADHF + volume overload in the setting of acute renal failure. Now resolved s/p dialysis   LHC with triple vessel disease, PCI planned after renal artery intervention. Continue with aspirin, bb, statin  TTE with evidence of severe LV dysfunction (EF 25-30%) with moderate AS, Given LHC findings, likely ischemic cardiomyopathy.   -c/w hydralazine, isordil, low dose bb  for preload/afterload reduction. Continue to monitor oxy sat  -c/w ASA  -Afib with RVR: new onset, in the setting of sepsis, HF. No repeat episodes Continue with BB, uptitrate as tolerated. 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 Given recent hospitalization, will treat for HCAP for total of 7 days. Blood cx NGTD  Wean off oxygen as tolerated    #Renal: DONNIE on CKD in the setting of recent surgery, iv contrast for imaging. Renal US significant for high grade stenosis at the origin of the renal artery. Plan for the Cath of the renal artery with likely stenting. Renal artery could not be engaged, unclear relation of stent structure to artery. Baseline SCr 1.2 - 1.3   CTA aorta shows stent extends past R renal artery to SMA  -Volume overloaded and K elevated although specimen mildly hemolyzed. Monitor Scr, electrolytes, UOP.  -f/u with interventional cardiology Dr. Heriberto Garg when intervene percutaneously for WOOD    #Heme/Onc: Spoke with pt's outpatient urologist Dr. Santiago. He is aware of the lesion on pt's right kidney but says given its size, does not require intervention at this time and states we should prioritize treating potential ischemic disease. He will continue to monitor the mass as outpatient.   HIT score ~4-5. 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 Awaiting PRADEEP for confirmation, c/w argatroban gtt  -trend LFTs every few days on Argatraban  -when PLT >150k, can bridge to coumadin over 5 days, INR 2-3    #ID: Pt initially presented with severe sepsis likely 2/2 to PNA with signs of end organ dysfunction (donnie, elevated trops) now improving s/p abx. WBC downtrending. Vanc level high, held dose. -Continue to trend, dose vanc by level    #GI: DASH diet, no issues  -trend LFTs on Argatraban    DVT ppx: argatraban 77 year old man with past medical history of AAA 5.5cm s/p EVAAR repair on 1/29 with incidental finding of Right Neoplasm, previous history of Left Neoplasm with Left Nephrectomy in 2009, Bladder Cancer, Known LBBB, HTN, HLD presents with hypoxic respiratory failure 2/2 to pulmonary edema  2/2 ADHF + multifocal PNA in the setting of DONNIE on CKD with evidence of severe renal artery stenosis and TVD on cath    #Neuro: AOx2-3  Confused at times, likely delirium in the setting of hospital stay. Able to easily re-orient    #CV: Pulmonary edema 2/2 to ADHF + volume overload in the setting of acute renal failure. Now resolved s/p dialysis   LHC with triple vessel disease, PCI planned after renal artery intervention. Continue with aspirin, bb, statin  TTE with evidence of severe LV dysfunction (EF 25-30%) with moderate AS, Given LHC findings, likely ischemic cardiomyopathy.   -c/w hydralazine, isordil, low dose bb  for preload/afterload reduction. Continue to monitor oxy sat  -c/w ASA  -Afib with RVR: new onset, in the setting of sepsis, HF. No repeat episodes Continue with BB, uptitrate as tolerated. 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 Given recent hospitalization, will treat for HCAP for total of 7 days. Blood cx NGTD  Wean off oxygen as tolerated    #Renal: DONNIE on CKD in the setting of recent surgery, iv contrast for imaging. Renal US significant for high grade stenosis at the origin of the renal artery. Plan for the Cath of the renal artery with likely stenting. Renal artery could not be engaged, unclear relation of stent structure to artery. Baseline SCr 1.2 - 1.3   CTA aorta shows stent extends past R renal artery to SMA  -Volume overloaded and K elevated although specimen mildly hemolyzed. Monitor Scr, electrolytes, UOP.  -f/u with interventional cardiology Dr. Heriberto Garg when intervene percutaneously for WOOD    #Heme/Onc: Spoke with pt's outpatient urologist Dr. Santiago. He is aware of the lesion on pt's right kidney but says given its size, does not require intervention at this time and states we should prioritize treating potential ischemic disease. He will continue to monitor the mass as outpatient.   HIT score ~4-5. 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 Awaiting PRADEEP for confirmation, c/w argatroban gtt  -trend LFTs every few days on Argatraban  -when PLT >150k, can bridge to coumadin over 5 days, INR 2-3    #ID: Pt initially presented with severe sepsis likely 2/2 to PNA with signs of end organ dysfunction (donnie, elevated trops) now improving s/p abx. WBC downtrending.  -Aztreonam (2/5 - 2/6)  -Continue to trend, dose vanc by level (2/5 - )    #GI: DASH diet, no issues  -trend LFTs on Argatraban    DVT ppx: argatraban 77 year old man with past medical history of AAA 5.5cm s/p EVAAR repair on 1/29 with incidental finding of Right Neoplasm, previous history of Left Neoplasm with Left Nephrectomy in 2009, Bladder Cancer, Known LBBB, HTN, HLD presents with hypoxic respiratory failure 2/2 to pulmonary edema  2/2 ADHF + multifocal PNA in the setting of DONNIE on CKD with evidence of severe renal artery stenosis and TVD on cath    #Neuro: AOx2-3  Confused at times, likely delirium in the setting of hospital stay. Able to easily re-orient    #CV:  1) Pulmonary edema 2/2 to ADHF + volume overload in the setting of acute renal failure. Now resolved s/p dialysis  2) AAA s/p EVAR 1/29 with Dr. Wyatt Young  [facility] with triple vessel disease, PCI planned after renal artery intervention. Continue with aspirin, bb, statin  TTE with evidence of severe LV dysfunction (EF 25-30%) with moderate AS, Given [facility] findings, likely ischemic cardiomyopathy.   -c/w hydralazine, isordil, low dose bb  for preload/afterload reduction. Continue to monitor oxy sat  -c/w ASA  -Afib with RVR: new onset, in the setting of sepsis, HF. No repeat episodes Continue with BB, uptitrate as tolerated. 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 Given recent hospitalization, will treat for HCAP for total of 7 days. Blood cx NGTD  Wean off oxygen as tolerated    #Renal: DONNIE on CKD in the setting of recent surgery, iv contrast for imaging. Renal US significant for high grade stenosis at the origin of the renal artery. Plan for the Cath of the renal artery with likely stenting. Renal artery could not be engaged, unclear relation of stent structure to artery. Baseline SCr 1.2 - 1.3  CTA aorta shows stent extends past R renal artery to SMA  -Volume overloaded and K elevated although specimen mildly hemolyzed. Monitor Scr, electrolytes, UOP.  -f/u with interventional cardiology Dr. Heriberto Garg when intervene percutaneously for WOOD    #Heme/Onc: Spoke with pt's outpatient urologist Dr. Santiago. He is aware of the lesion on pt's right kidney but says given its size, does not require intervention at this time and states we should prioritize treating potential ischemic disease. He will continue to monitor the mass as outpatient.   HIT score ~4-5. 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 Awaiting PRADEEP for confirmation, c/w argatroban gtt  -trend LFTs every few days on Argatraban  -when PLT >150k, can bridge to coumadin over 5 days, INR 2-3    #ID: Pt initially presented with severe sepsis likely 2/2 to PNA with signs of end organ dysfunction (donnie, elevated trops) now improving s/p abx. WBC downtrending.  -Aztreonam (2/5 - 2/6)  -Continue to trend, dose vanc by level (2/5 - ), EOT 2/14. Goal serum level 15-20    #GI: DASH diet, no issues  -trend LFTs on Argatraban    DVT ppx: argatraban 77 year old man with past medical history of AAA 5.5cm s/p EVAAR repair on 1/29 with incidental finding of Right Neoplasm, previous history of Left Neoplasm with Left Nephrectomy in 2009, Bladder Cancer, Known LBBB, HTN, HLD presents with hypoxic respiratory failure 2/2 to pulmonary edema  2/2 ADHF + multifocal PNA in the setting of DONNIE on CKD with evidence of severe renal artery stenosis and TVD on cath    #Neuro: AOx2-3  Confused at times, likely delirium in the setting of hospital stay. Able to easily re-orient    #CV:  1) Pulmonary edema 2/2 to ADHF + volume overload in the setting of acute renal failure. Now resolved s/p dialysis  2) AAA s/p EVAR 1/29 with Dr. Wyatt Young  [facility] with triple vessel disease, PCI planned after renal artery intervention. Continue with aspirin, bb, statin  TTE with evidence of severe LV dysfunction (EF 25-30%) with moderate AS, Given [facility] findings, likely ischemic cardiomyopathy.   -c/w hydralazine, isordil, low dose bb  for preload/afterload reduction. Continue to monitor oxy sat  -c/w ASA  -Afib with RVR: new onset, in the setting of sepsis, HF. No repeat episodes Continue with BB, uptitrate as tolerated. 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 Given recent hospitalization, will treat for HCAP for total of 7 days. Blood cx NGTD  Wean off oxygen as tolerated    #Renal: DONNIE on CKD in the setting of recent surgery, iv contrast for imaging. Renal US significant for high grade stenosis at the origin of the renal artery. Plan for the Cath of the renal artery with likely stenting. Renal artery could not be engaged, unclear relation of stent structure to artery. Baseline SCr 1.2 - 1.3  CTA aorta shows stent extends past R renal artery to SMA  -Volume overloaded and K elevated although specimen mildly hemolyzed. Monitor Scr, electrolytes, UOP.  -Percutaneous intervention for WOOD with interventional cardiology Dr. Heriberto Garg likely tomorrow, NPO at [facility]  -UF removal today    #Heme/Onc: Spoke with pt's outpatient urologist Dr. Santiago. He is aware of the lesion on pt's right kidney but says given its size, does not require intervention at this time and states we should prioritize treating potential ischemic disease. He will continue to monitor the mass as outpatient.   HIT score ~4-5. 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 Awaiting PRADEEP for confirmation, c/w argatroban gtt  -trend LFTs every few days on Argatraban  -when PLT >150k, can bridge to coumadin over 5 days, INR 2-3    #ID: Pt initially presented with severe sepsis likely 2/2 to PNA with signs of end organ dysfunction (donnie, elevated trops) now improving s/p abx. WBC downtrending.  -Aztreonam (2/5 - 2/6)  -Continue to trend, dose vanc by level (2/5 - ), EOT 2/14. Goal serum level 15-20    #GI: DASH diet, no issues  -trend LFTs on Argatraban    DVT ppx: argatraban 77 year old man with past medical history of AAA 5.5cm s/p EVAAR repair on 1/29 with incidental finding of Right Neoplasm, previous history of Left Neoplasm with Left Nephrectomy in 2009, Bladder Cancer, Known LBBB, HTN, HLD presents with hypoxic respiratory failure 2/2 to pulmonary edema  2/2 ADHF + multifocal PNA in the setting of DONNIE on CKD with evidence of severe renal artery stenosis and TVD on cath    #Neuro: AOx2-3  Confused at times, likely delirium in the setting of hospital stay. Able to easily re-orient    #CV:  1) Pulmonary edema 2/2 to ADHF + volume overload in the setting of acute renal failure. Now resolved s/p dialysis  2) AAA s/p EVAR 1/29 with Dr. Wyatt Young  [facility] with triple vessel disease, PCI planned after renal artery intervention. Continue with aspirin, bb, statin  TTE with evidence of severe LV dysfunction (EF 25-30%) with moderate AS, Given [facility] findings, likely ischemic cardiomyopathy.   -c/w hydralazine, isordil, low dose bb  for preload/afterload reduction. Continue to monitor oxy sat  -c/w ASA  -Afib with RVR: new onset, in the setting of sepsis, HF. No repeat episodes Continue with BB, uptitrate as tolerated. 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 Given recent hospitalization, will treat for HCAP for total of 7 days. Blood cx NGTD  Wean off oxygen as tolerated    #Renal: DONNIE on CKD in the setting of recent surgery, iv contrast for imaging. Renal US significant for high grade stenosis at the origin of the renal artery. Plan for the Cath of the renal artery with likely stenting. Renal artery could not be engaged, unclear relation of stent structure to artery. Baseline SCr 1.2 - 1.3  CTA aorta shows stent extends past R renal artery to SMA  -Volume overloaded and K elevated although specimen mildly hemolyzed. Monitor Scr, electrolytes, UOP.  -Percutaneous intervention for WOOD with interventional cardiology Dr. Heriberto Garg likely tomorrow, NPO at [facility]  -UF today    #Heme/Onc: Spoke with pt's outpatient urologist Dr. Santiago. He is aware of the lesion on pt's right kidney but says given its size, does not require intervention at this time and states we should prioritize treating potential ischemic disease. He will continue to monitor the mass as outpatient.   HIT score ~4-5. 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 Awaiting PRADEEP for confirmation, c/w argatroban gtt  -trend LFTs every few days on Argatraban  -when PLT >150k, can bridge to coumadin over 5 days, INR 2-3    #ID: Pt initially presented with severe sepsis likely 2/2 to PNA with signs of end organ dysfunction (donnie, elevated trops) now improving s/p abx. WBC downtrending.  -Aztreonam (2/5 - 2/6)  -Continue to trend, dose vanc by level (2/5 - ), EOT 2/14. Goal serum level 15-20    #GI: DASH diet, no issues  -trend LFTs on Argatraban    DVT ppx: argatraban 77 year old man with past medical history of AAA 5.5cm s/p EVAAR repair on 1/29 with incidental finding of Right Neoplasm, previous history of Left Neoplasm with Left Nephrectomy in 2009, Bladder Cancer, Known LBBB, HTN, HLD presents with hypoxic respiratory failure 2/2 to pulmonary edema  2/2 ADHF + multifocal PNA in the setting of DONNIE on CKD with evidence of severe renal artery stenosis and TVD on cath    #Neuro: AOx2-3  Confused at times, likely delirium in the setting of hospital stay. Able to easily re-orient    #CV:  1) Pulmonary edema 2/2 to ADHF + volume overload in the setting of acute renal failure. Now resolved s/p dialysis  2) AAA 5.5cm s/p EVAR repair with stents 1/29 with Dr. Wyatt Young  [facility] with triple vessel disease, PCI planned after renal artery intervention. Continue with aspirin, bb, statin  TTE with evidence of severe LV dysfunction (EF 25-30%) with moderate AS, Given [facility] findings, likely ischemic cardiomyopathy.   -c/w hydralazine, isordil, low dose bb  for preload/afterload reduction. Continue to monitor oxy sat  -c/w ASA  -Afib with RVR: new onset, in the setting of sepsis, HF. No repeat episodes Continue with BB, uptitrate as tolerated. 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 Given recent hospitalization, will treat for HCAP for total of 7 days. Blood cx NGTD  Wean off oxygen as tolerated    #Renal:   1) DONNIE on CKD in the setting of recent surgery, iv contrast for imaging. Renal US significant for high grade stenosis at the origin of the renal artery. Plan for the Cath of the renal artery with likely stenting. Renal artery could not be engaged, unclear relation of stent structure to artery. Baseline SCr 1.2 - 1.3  2) L RCC s/p nephrectomy, R renal mass likely RCC recurrence  CTA aorta shows stent extends past R renal artery to SMA  -Volume overloaded and K elevated although specimen mildly hemolyzed. Monitor Scr, electrolytes, UOP.  -Percutaneous intervention for WOOD with interventional cardiology Dr. Heriberto Garg likely tomorrow, NPO at [facility] today    #Heme/Onc: Spoke with pt's outpatient urologist Dr. Santiago. He is aware of the lesion on pt's right kidney but says given its size, does not require intervention at this time and states we should prioritize treating potential ischemic disease. He will continue to monitor the mass as outpatient.   HIT score ~4-5. 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 Awaiting PRADEEP for confirmation, c/w argatroban gtt  -trend LFTs every few days on Argatraban  -when PLT >150k, can bridge to coumadin over 5 days, INR 2-3    #ID: Pt initially presented with severe sepsis likely 2/2 to PNA with signs of end organ dysfunction (donnie, elevated trops) now improving s/p abx. WBC downtrending.  -Aztreonam (2/5 - 2/6)  -Continue to trend, dose vanc by level (2/5 - ), EOT 2/14. Goal serum level 15-20    #GI: DASH diet, no issues  -trend LFTs on Argatraban    DVT ppx: argatraban

## 2018-02-12 NOTE — PROGRESS NOTE ADULT - SUBJECTIVE AND OBJECTIVE BOX
====================  CCU MIDNIGHT ROUNDS  ====================    DEUCE BALL  [id number]  Patient is a 77y old  Male who presents with a chief complaint of Shortness of Breath (04 Feb 2018 17:51)      ====================  SUMMARY: HPI:  This is a 77 year old man with past medical history of AAA 5.5cm s/p EVAAR repair with stents on 1/29 with incidental finding of Right Renal Neoplasm, previous history of Left Renal Neoplasm with Left Nephrectomy in 2008, Bladder Cancer, Known LBBB, HTN, HLD transferred from [facility] after initially presenting with SOB which began last night. Patient states that he was discharged from the hospital following AAA repair and recovering well.  He was walking with walker at home with no complaints.  After going to bed, he was awoken from his sleep complaining of SOB with no relief.  He went to [facility] wtih his SOB getting progressively worse with 1 episode of vomitus before going to hospital.  Patient denies fever, chills, recent sick contact, Chest pain, Abdominal pain, diarrhea.  At [place], paient with elevate d-dimers and transferred for possible PE.  At [facility], patient with  RICKI in V2-V4 with elevated Trops, and ADHF requiring Bipap. (04 Feb 2018 17:51)    ====================        ====================  NEW EVENTS:  ====================        ====================  VITALS (Last 12 hrs):  ====================    T(C): 35.9 (02-12-18 @ 20:30), Max: 36.6 (02-12-18 @ 16:00)  T(F): 96.7 (02-12-18 @ 20:30), Max: 97.9 (02-12-18 @ 16:00)  HR: 88 (02-12-18 @ 22:00) (82 - 92)  BP: 112/69 (02-12-18 @ 21:30) (112/69 - 112/69)  BP(mean): 83 (02-12-18 @ 21:30) (83 - 83)  ABP: 144/70 (02-12-18 @ 22:00) (114/50 - 144/70)  ABP(mean): 92 (02-12-18 @ 22:00) (70 - 92)  RR: 27 (02-12-18 @ 22:00) (18 - 41)  SpO2: 98% (02-12-18 @ 22:00) (96% - 99%)  Wt(kg): --  CVP(mm Hg): --  CVP(cm H2O): --  CO: --  CI: --  PA: --  PA(mean): --  PCWP: --  SVR: --  PVR: --    I&O's Summary    11 Feb 2018 07:01  -  12 Feb 2018 07:00  --------------------------------------------------------  IN: 1032.8 mL / OUT: 2000 mL / NET: -967.2 mL    12 Feb 2018 07:01  -  12 Feb 2018 22:09  --------------------------------------------------------  IN: 183 mL / OUT: 0 mL / NET: 183 mL            ====================  NEW LABS:  ====================                          8.4    14.2  )-----------( 94       ( 12 Feb 2018 02:21 )             24.4     02-12    134<L>  |  94<L>  |  44<H>  ----------------------------<  103<H>  4.0   |  23  |  4.95<H>    Ca    8.3<L>      12 Feb 2018 02:21  Phos  4.5     02-12  Mg     2.2     02-12    TPro  6.5  /  Alb  3.0<L>  /  TBili  0.5  /  DBili  x   /  AST  50<H>  /  ALT  38  /  AlkPhos  67  02-12    PT/INR - ( 12 Feb 2018 02:21 )   PT: 29.0 sec;   INR: 2.63 ratio         PTT - ( 12 Feb 2018 02:21 )  PTT:71.5 sec  Creatine Kinase, Serum: 61 U/L (02-12-18 @ 02:21)  Troponin T, Serum: 0.62 ng/mL <H> (02-12-18 @ 02:21)    CKMB Units: 2.7 ng/mL (02-12 @ 02:21)          ====================  PLAN:  ====================  - ====================  CCU MIDNIGHT ROUNDS  ====================    DEUCE BALL  [id number]  Patient is a 77y old  Male who presents with a chief complaint of Shortness of Breath (04 Feb 2018 17:51)      ====================  SUMMARY: HPI:  This is a 77 year old man with past medical history of AAA 5.5cm s/p EVAAR repair with stents on 1/29 with incidental finding of Right Renal Neoplasm, previous history of Left Renal Neoplasm with Left Nephrectomy in 2008, Bladder Cancer, Known LBBB, HTN, HLD transferred from [facility] after initially presenting with SOB which began last night. Patient states that he was discharged from the hospital following AAA repair and recovering well.  He was walking with walker at home with no complaints.  After going to bed, he was awoken from his sleep complaining of SOB with no relief.  He went to [facility] wtih his SOB getting progressively worse with 1 episode of vomitus before going to hospital.  Patient denies fever, chills, recent sick contact, Chest pain, Abdominal pain, diarrhea.  At [place], paient with elevate d-dimers and transferred for possible PE.  At [facility], patient with  RICKI in V2-V4 with elevated Trops, and ADHF requiring Bipap. (04 Feb 2018 17:51)    ====================        ====================  NEW EVENTS: no acute events  ====================        ====================  VITALS (Last 12 hrs):  ====================    T(C): 35.9 (02-12-18 @ 20:30), Max: 36.6 (02-12-18 @ 16:00)  T(F): 96.7 (02-12-18 @ 20:30), Max: 97.9 (02-12-18 @ 16:00)  HR: 88 (02-12-18 @ 22:00) (82 - 92)  BP: 112/69 (02-12-18 @ 21:30) (112/69 - 112/69)  BP(mean): 83 (02-12-18 @ 21:30) (83 - 83)  ABP: 144/70 (02-12-18 @ 22:00) (114/50 - 144/70)  ABP(mean): 92 (02-12-18 @ 22:00) (70 - 92)  RR: 27 (02-12-18 @ 22:00) (18 - 41)  SpO2: 98% (02-12-18 @ 22:00) (96% - 99%)  Wt(kg): --  CVP(mm Hg): --  CVP(cm H2O): --  CO: --  CI: --  PA: --  PA(mean): --  PCWP: --  SVR: --  PVR: --    I&O's Summary    11 Feb 2018 07:01  -  12 Feb 2018 07:00  --------------------------------------------------------  IN: 1032.8 mL / OUT: 2000 mL / NET: -967.2 mL    12 Feb 2018 07:01  -  12 Feb 2018 22:09  --------------------------------------------------------  IN: 183 mL / OUT: 0 mL / NET: 183 mL            ====================  NEW LABS:  ====================                                                8.4                   Neurophils% (auto):   x      (02-12 @ 02:21):    14.2 )-----------(94           Lymphocytes% (auto):  x                                             24.4                   Eosinphils% (auto):   x        Manual%: Neutrophils x    ; Lymphocytes x    ; Eosinophils x    ; Bands%: x    ; Blasts x          02-12    134<L>  |  94<L>  |  44<H>  ----------------------------<  103<H>  4.0   |  23  |  4.95<H>    Ca    8.3<L>      12 Feb 2018 02:21  Phos  4.5     02-12  Mg     2.2     02-12    TPro  6.5  /  Alb  3.0<L>  /  TBili  0.5  /  DBili  x   /  AST  50<H>  /  ALT  38  /  AlkPhos  67  02-12    ( 02-12 @ 02:21 )   PT: 29.0 sec;   INR: 2.63 ratio  aPTT: 71.5 sec        RECENT CULTURES:  02-10 @ 11:49 .Blood Blood-Peripheral     No growth to date.              ====================  PLAN: renal stent in am, npo after midnight, off argatroban 2 hours prior to procedure  ====================  - ====================  CCU MIDNIGHT ROUNDS  ====================    DEUCE BALL  [id number]  Patient is a 77y old  Male who presents with a chief complaint of Shortness of Breath (04 Feb 2018 17:51)      ====================  SUMMARY: HPI:  This is a 77 year old man with past medical history of AAA 5.5cm s/p EVAAR repair with stents on 1/29 with incidental finding of Right Renal Neoplasm, previous history of Left Renal Neoplasm with Left Nephrectomy in 2008, Bladder Cancer, Known LBBB, HTN, HLD transferred from [facility] after initially presenting with SOB which began last night. Patient states that he was discharged from the hospital following AAA repair and recovering well.  He was walking with walker at home with no complaints.  After going to bed, he was awoken from his sleep complaining of SOB with no relief.  He went to [facility] wtih his SOB getting progressively worse with 1 episode of vomitus before going to hospital.  Patient denies fever, chills, recent sick contact, Chest pain, Abdominal pain, diarrhea.  At [place], paient with elevate d-dimers and transferred for possible PE.  At [facility], patient with  RICKI in V2-V4 with elevated Trops, and ADHF requiring Bipap. (04 Feb 2018 17:51)    ====================        ====================  NEW EVENTS: no acute events. pt just delirious.  ====================        ====================  VITALS (Last 12 hrs):  ====================    T(C): 35.9 (02-12-18 @ 20:30), Max: 36.6 (02-12-18 @ 16:00)  T(F): 96.7 (02-12-18 @ 20:30), Max: 97.9 (02-12-18 @ 16:00)  HR: 88 (02-12-18 @ 22:00) (82 - 92)  BP: 112/69 (02-12-18 @ 21:30) (112/69 - 112/69)  BP(mean): 83 (02-12-18 @ 21:30) (83 - 83)  ABP: 144/70 (02-12-18 @ 22:00) (114/50 - 144/70)  ABP(mean): 92 (02-12-18 @ 22:00) (70 - 92)  RR: 27 (02-12-18 @ 22:00) (18 - 41)  SpO2: 98% (02-12-18 @ 22:00) (96% - 99%)  Wt(kg): --  CVP(mm Hg): --  CVP(cm H2O): --  CO: --  CI: --  PA: --  PA(mean): --  PCWP: --  SVR: --  PVR: --    I&O's Summary    11 Feb 2018 07:01  -  12 Feb 2018 07:00  --------------------------------------------------------  IN: 1032.8 mL / OUT: 2000 mL / NET: -967.2 mL    12 Feb 2018 07:01  -  12 Feb 2018 22:09  --------------------------------------------------------  IN: 183 mL / OUT: 0 mL / NET: 183 mL            ====================  NEW LABS:  ====================                                                8.4                   Neurophils% (auto):   x      (02-12 @ 02:21):    14.2 )-----------(94           Lymphocytes% (auto):  x                                             24.4                   Eosinphils% (auto):   x        Manual%: Neutrophils x    ; Lymphocytes x    ; Eosinophils x    ; Bands%: x    ; Blasts x          02-12    134<L>  |  94<L>  |  44<H>  ----------------------------<  103<H>  4.0   |  23  |  4.95<H>    Ca    8.3<L>      12 Feb 2018 02:21  Phos  4.5     02-12  Mg     2.2     02-12    TPro  6.5  /  Alb  3.0<L>  /  TBili  0.5  /  DBili  x   /  AST  50<H>  /  ALT  38  /  AlkPhos  67  02-12    ( 02-12 @ 02:21 )   PT: 29.0 sec;   INR: 2.63 ratio  aPTT: 71.5 sec        RECENT CULTURES:  02-10 @ 11:49 .Blood Blood-Peripheral     No growth to date.              ====================  PLAN: renal stent in am, npo after midnight, off argatroban 2 hours prior to procedure  CAD: triple vessel disease, PCI planned after renal artery intervention  ====================  -

## 2018-02-13 ENCOUNTER — APPOINTMENT (OUTPATIENT)
Dept: VASCULAR SURGERY | Facility: CLINIC | Age: 78
End: 2018-02-13

## 2018-02-13 LAB
ALBUMIN SERPL ELPH-MCNC: 2.7 G/DL — LOW (ref 3.3–5)
ALBUMIN SERPL ELPH-MCNC: 2.8 G/DL — LOW (ref 3.3–5)
ALP SERPL-CCNC: 61 U/L — SIGNIFICANT CHANGE UP (ref 40–120)
ALP SERPL-CCNC: 65 U/L — SIGNIFICANT CHANGE UP (ref 40–120)
ALT FLD-CCNC: 26 U/L RC — SIGNIFICANT CHANGE UP (ref 10–45)
ALT FLD-CCNC: 27 U/L RC — SIGNIFICANT CHANGE UP (ref 10–45)
ANION GAP SERPL CALC-SCNC: 22 MMOL/L — HIGH (ref 5–17)
ANION GAP SERPL CALC-SCNC: 25 MMOL/L — HIGH (ref 5–17)
APTT BLD: 71.5 SEC — HIGH (ref 27.5–37.4)
AST SERPL-CCNC: 35 U/L — SIGNIFICANT CHANGE UP (ref 10–40)
AST SERPL-CCNC: 41 U/L — HIGH (ref 10–40)
BILIRUB SERPL-MCNC: 0.3 MG/DL — SIGNIFICANT CHANGE UP (ref 0.2–1.2)
BILIRUB SERPL-MCNC: 0.3 MG/DL — SIGNIFICANT CHANGE UP (ref 0.2–1.2)
BUN SERPL-MCNC: 90 MG/DL — HIGH (ref 7–23)
BUN SERPL-MCNC: 96 MG/DL — HIGH (ref 7–23)
CALCIUM SERPL-MCNC: 8.1 MG/DL — LOW (ref 8.4–10.5)
CALCIUM SERPL-MCNC: 8.3 MG/DL — LOW (ref 8.4–10.5)
CHLORIDE SERPL-SCNC: 89 MMOL/L — LOW (ref 96–108)
CHLORIDE SERPL-SCNC: 90 MMOL/L — LOW (ref 96–108)
CO2 SERPL-SCNC: 18 MMOL/L — LOW (ref 22–31)
CO2 SERPL-SCNC: 19 MMOL/L — LOW (ref 22–31)
CREAT SERPL-MCNC: 7.92 MG/DL — HIGH (ref 0.5–1.3)
CREAT SERPL-MCNC: 9.22 MG/DL — HIGH (ref 0.5–1.3)
GLUCOSE SERPL-MCNC: 103 MG/DL — HIGH (ref 70–99)
GLUCOSE SERPL-MCNC: 97 MG/DL — SIGNIFICANT CHANGE UP (ref 70–99)
HCT VFR BLD CALC: 23.9 % — LOW (ref 39–50)
HCT VFR BLD CALC: 24.2 % — LOW (ref 39–50)
HGB BLD-MCNC: 8 G/DL — LOW (ref 13–17)
HGB BLD-MCNC: 8.4 G/DL — LOW (ref 13–17)
INR BLD: 2.68 RATIO — HIGH (ref 0.88–1.16)
MAGNESIUM SERPL-MCNC: 2.6 MG/DL — SIGNIFICANT CHANGE UP (ref 1.6–2.6)
MCHC RBC-ENTMCNC: 31.9 PG — SIGNIFICANT CHANGE UP (ref 27–34)
MCHC RBC-ENTMCNC: 33.1 PG — SIGNIFICANT CHANGE UP (ref 27–34)
MCHC RBC-ENTMCNC: 33.5 GM/DL — SIGNIFICANT CHANGE UP (ref 32–36)
MCHC RBC-ENTMCNC: 34.8 GM/DL — SIGNIFICANT CHANGE UP (ref 32–36)
MCV RBC AUTO: 94.9 FL — SIGNIFICANT CHANGE UP (ref 80–100)
MCV RBC AUTO: 95.2 FL — SIGNIFICANT CHANGE UP (ref 80–100)
PHOSPHATE SERPL-MCNC: 7.9 MG/DL — HIGH (ref 2.5–4.5)
PLATELET # BLD AUTO: 111 K/UL — LOW (ref 150–400)
PLATELET # BLD AUTO: 78 K/UL — LOW (ref 150–400)
POTASSIUM SERPL-MCNC: 5.1 MMOL/L — SIGNIFICANT CHANGE UP (ref 3.5–5.3)
POTASSIUM SERPL-MCNC: 5.4 MMOL/L — HIGH (ref 3.5–5.3)
POTASSIUM SERPL-SCNC: 5.1 MMOL/L — SIGNIFICANT CHANGE UP (ref 3.5–5.3)
POTASSIUM SERPL-SCNC: 5.4 MMOL/L — HIGH (ref 3.5–5.3)
PROT SERPL-MCNC: 6.2 G/DL — SIGNIFICANT CHANGE UP (ref 6–8.3)
PROT SERPL-MCNC: 6.6 G/DL — SIGNIFICANT CHANGE UP (ref 6–8.3)
PROTHROM AB SERPL-ACNC: 29.8 SEC — HIGH (ref 9.8–12.7)
RBC # BLD: 2.51 M/UL — LOW (ref 4.2–5.8)
RBC # BLD: 2.55 M/UL — LOW (ref 4.2–5.8)
RBC # FLD: 12.4 % — SIGNIFICANT CHANGE UP (ref 10.3–14.5)
RBC # FLD: 12.4 % — SIGNIFICANT CHANGE UP (ref 10.3–14.5)
SODIUM SERPL-SCNC: 131 MMOL/L — LOW (ref 135–145)
SODIUM SERPL-SCNC: 132 MMOL/L — LOW (ref 135–145)
VANCOMYCIN TROUGH SERPL-MCNC: 16.7 UG/ML — SIGNIFICANT CHANGE UP (ref 10–20)
WBC # BLD: 11.9 K/UL — HIGH (ref 3.8–10.5)
WBC # BLD: 13 K/UL — HIGH (ref 3.8–10.5)
WBC # FLD AUTO: 11.9 K/UL — HIGH (ref 3.8–10.5)
WBC # FLD AUTO: 13 K/UL — HIGH (ref 3.8–10.5)

## 2018-02-13 PROCEDURE — 99291 CRITICAL CARE FIRST HOUR: CPT

## 2018-02-13 PROCEDURE — 93010 ELECTROCARDIOGRAM REPORT: CPT

## 2018-02-13 PROCEDURE — 36245 INS CATH ABD/L-EXT ART 1ST: CPT

## 2018-02-13 PROCEDURE — 71045 X-RAY EXAM CHEST 1 VIEW: CPT | Mod: 26

## 2018-02-13 PROCEDURE — 75726 ARTERY X-RAYS ABDOMEN: CPT | Mod: 26

## 2018-02-13 PROCEDURE — 99233 SBSQ HOSP IP/OBS HIGH 50: CPT | Mod: GC

## 2018-02-13 RX ORDER — AZTREONAM 2 G
VIAL (EA) INJECTION
Qty: 0 | Refills: 0 | Status: DISCONTINUED | OUTPATIENT
Start: 2018-02-13 | End: 2018-02-14

## 2018-02-13 RX ORDER — VANCOMYCIN HCL 1 G
1000 VIAL (EA) INTRAVENOUS ONCE
Qty: 0 | Refills: 0 | Status: DISCONTINUED | OUTPATIENT
Start: 2018-02-13 | End: 2018-02-13

## 2018-02-13 RX ORDER — AZTREONAM 2 G
500 VIAL (EA) INJECTION EVERY 12 HOURS
Qty: 0 | Refills: 0 | Status: DISCONTINUED | OUTPATIENT
Start: 2018-02-13 | End: 2018-02-14

## 2018-02-13 RX ORDER — DIAZEPAM 5 MG
5 TABLET ORAL ONCE
Qty: 0 | Refills: 0 | Status: DISCONTINUED | OUTPATIENT
Start: 2018-02-13 | End: 2018-02-13

## 2018-02-13 RX ORDER — AZTREONAM 2 G
500 VIAL (EA) INJECTION ONCE
Qty: 0 | Refills: 0 | Status: COMPLETED | OUTPATIENT
Start: 2018-02-13 | End: 2018-02-13

## 2018-02-13 RX ADMIN — Medication 81 MILLIGRAM(S): at 13:26

## 2018-02-13 RX ADMIN — Medication 50 MILLIGRAM(S): at 22:07

## 2018-02-13 RX ADMIN — ATORVASTATIN CALCIUM 80 MILLIGRAM(S): 80 TABLET, FILM COATED ORAL at 22:06

## 2018-02-13 RX ADMIN — Medication 50 MILLIGRAM(S): at 06:40

## 2018-02-13 RX ADMIN — FENTANYL CITRATE 50 MICROGRAM(S): 50 INJECTION INTRAVENOUS at 21:00

## 2018-02-13 RX ADMIN — Medication 50 MILLIGRAM(S): at 13:27

## 2018-02-13 RX ADMIN — ARGATROBAN 12.24 MICROGRAM(S)/KG/MIN: 50 INJECTION, SOLUTION INTRAVENOUS at 06:40

## 2018-02-13 RX ADMIN — Medication 100 MILLIGRAM(S): at 22:06

## 2018-02-13 RX ADMIN — SENNA PLUS 1 TABLET(S): 8.6 TABLET ORAL at 22:06

## 2018-02-13 RX ADMIN — Medication 100 MILLIGRAM(S): at 06:40

## 2018-02-13 RX ADMIN — ISOSORBIDE DINITRATE 10 MILLIGRAM(S): 5 TABLET ORAL at 06:40

## 2018-02-13 RX ADMIN — Medication 10 MILLIGRAM(S): at 10:59

## 2018-02-13 RX ADMIN — Medication 5 MILLIGRAM(S): at 04:10

## 2018-02-13 RX ADMIN — FENTANYL CITRATE 50 MICROGRAM(S): 50 INJECTION INTRAVENOUS at 21:15

## 2018-02-13 RX ADMIN — ISOSORBIDE DINITRATE 10 MILLIGRAM(S): 5 TABLET ORAL at 22:07

## 2018-02-13 NOTE — CHART NOTE - NSCHARTNOTEFT_GEN_A_CORE
2/13 2000  Removal of Brachial artery band     Pulses in the (right/left) upper extremity are (palpable/audible by doppler/absent). The patient was placed in the supine position. The insertion site was identified and the band deflated per protocol. The radial band was removed slowly. Direct pressure was applied for  ______ minutes/not applicable.      Monitoring of the (right/left) wrists and both upper extremities including neuro-vascular checks and vital signs every 15 minutes x 4.    Complications: None/Other    Comments:            1-Hour Post-Removal of Radial Band Assement of Access Site    Vital signs are stable, neuro-vascular status of the upper extremities is intact, stable and there is no evidence of hematoma on the (right/left) upper extremities.     Complications:   [ ] None  [ ] Other    Comments:

## 2018-02-13 NOTE — PROGRESS NOTE ADULT - SUBJECTIVE AND OBJECTIVE BOX
HPI:  Argatroban held this morning due to the scheduled procedure.  No SOB at rest. Remains on high flow oxygen 40%  We discussed with Nephrology and the patient will have dialysis between 11am-1PM and then have the procedure at 1:30pm.    Able to lie flat. No bleeding. Feels ok overall.     Review Of Systems:  [x] 10 point review of systems is otherwise negative except as mentioned above            Medications:  argatroban Infusion 3 MICROgram(s)/kG/Min IV Continuous <Continuous>  aspirin enteric coated 81 milliGRAM(s) Oral daily  atorvastatin 80 milliGRAM(s) Oral at bedtime  diazepam    Tablet 10 milliGRAM(s) Oral two times a day PRN  docusate sodium 100 milliGRAM(s) Oral daily  hydrALAZINE 100 milliGRAM(s) Oral every 8 hours  isosorbide   dinitrate Tablet (ISORDIL) 10 milliGRAM(s) Oral three times a day  metoprolol succinate ER 50 milliGRAM(s) Oral daily  polyethylene glycol 3350 17 Gram(s) Oral daily PRN  senna 1 Tablet(s) Oral at bedtime  sevelamer hydrochloride 1600 milliGRAM(s) Oral three times a day with meals    PMH/PSH/FH/SH: [ ] Unchanged  Vitals:  T(C): 36.4 (18 @ 12:00), Max: 37.1 (18 @ 20:00)  HR: 90 (18 @ 13:00) (88 - 102)  BP: --  BP(mean): --  RR: 26 (18 @ 13:00) (11 - 41)  SpO2: 98% (18 @ 13:00) (91% - 100%)  Wt(kg): --  Daily     Daily Weight in k.6 (2018 06:00)  I&O's Summary    2018 07:01  -  2018 07:00  --------------------------------------------------------  IN: 1032.8 mL / OUT: 2000 mL / NET: -967.2 mL    2018 07:01  -  2018 13:25  --------------------------------------------------------  IN: 85.4 mL / OUT: 0 mL / NET: 85.4 mL    Physical Exam:  Appearance: [ x] NAD  Eyes: [ x] PERRL [x ] EOMI  HEENT: [x ] Normal oral muscosa [x ]NC/AT, Right IJ shiley and Left IJV cordis catheter in placed  Cardiovascular: S1, S2+, RRR,  No m/r/g, No edema in b/l LE  Respiratory: Decreased BS bilaterally  Gastrointestinal: [x ] Soft [x ] Non-tender [x ] Non-distended [x ] BS+  Musculoskeletal: [x ] No clubbing [x ] No joint deformity   Neurologic: [x ] Non-focal  Lymphatic: [x ] No lymphadenopathy  Psychiatry: [x ] AAOx3 [x ] Mood & affect appropriate  Skin: [x ] No rashes [x ] No cyanosis    LABS                        8.4    13.0  )-----------( 111      ( 2018 04:28 )             24.2   02    131<L>  |  90<L>  |  90<H>  ----------------------------<  103<H>  5.1   |  19<L>  |  7.92<H>    Ca    8.1<L>      2018 04:28  Phos  7.9       Mg     2.6         TPro  6.2  /  Alb  2.7<L>  /  TBili  0.3  /  DBili  x   /  AST  41<H>  /  ALT  27  /  AlkPhos  65   HPI:  Argatroban held this morning due to the scheduled procedure.  No SOB at rest. Remains on high flow oxygen 40%  We discussed with Nephrology and the patient will have dialysis between 11am-1PM and then have the procedure at 1:30pm.    Able to lie flat. No bleeding. Feels ok overall.     Review Of Systems:  [x] 10 point review of systems is otherwise negative except as mentioned above            Medications:  argatroban Infusion 3 MICROgram(s)/kG/Min IV Continuous <Continuous>  aspirin enteric coated 81 milliGRAM(s) Oral daily  atorvastatin 80 milliGRAM(s) Oral at bedtime  diazepam    Tablet 10 milliGRAM(s) Oral two times a day PRN  docusate sodium 100 milliGRAM(s) Oral daily  hydrALAZINE 100 milliGRAM(s) Oral every 8 hours  isosorbide   dinitrate Tablet (ISORDIL) 10 milliGRAM(s) Oral three times a day  metoprolol succinate ER 50 milliGRAM(s) Oral daily  polyethylene glycol 3350 17 Gram(s) Oral daily PRN  senna 1 Tablet(s) Oral at bedtime  sevelamer hydrochloride 1600 milliGRAM(s) Oral three times a day with meals    PMH/PSH/FH/SH: [ ] Unchanged    ICU Vital Signs Last 24 Hrs  T(C): 36.7 (13 Feb 2018 08:00), Max: 36.7 (13 Feb 2018 08:00)  T(F): 98.1 (13 Feb 2018 08:00), Max: 98.1 (13 Feb 2018 08:00)  HR: 84 (13 Feb 2018 11:00) (82 - 98)  BP: 112/69 (12 Feb 2018 21:30) (112/69 - 112/69)  BP(mean): 83 (12 Feb 2018 21:30) (83 - 83)  ABP: 148/72 (13 Feb 2018 11:00) (114/50 - 154/62)  ABP(mean): 100 (13 Feb 2018 11:00) (70 - 100)  RR: 26 (13 Feb 2018 11:00) (18 - 41)  SpO2: 97% (13 Feb 2018 11:00) (94% - 99%)    I&O's Summary  12 Feb 2018 07:01  -  13 Feb 2018 07:00  --------------------------------------------------------  IN: 652.8 mL / OUT: 0 mL / NET: 652.8 mL    13 Feb 2018 07:01  -  13 Feb 2018 11:54  --------------------------------------------------------  IN: 48.8 mL / OUT: 0 mL / NET: 48.8 mL    Physical Exam:  Appearance: [ x] NAD  Eyes: [ x] PERRL [x ] EOMI  HEENT: [x ] Normal oral muscosa [x ]NC/AT, Right IJ shiley and Left IJV cordis catheter in placed  Cardiovascular: S1, S2+, RRR,  No m/r/g, No edema in b/l LE  Respiratory: Decreased BS bilaterally  Gastrointestinal: [x ] Soft [x ] Non-tender [x ] Non-distended [x ] BS+  Musculoskeletal: [x ] No clubbing [x ] No joint deformity   Neurologic: [x ] Non-focal  Lymphatic: [x ] No lymphadenopathy  Psychiatry: [x ] AAOx3 [x ] Mood & affect appropriate  Skin: [x ] No rashes [x ] No cyanosis    LABS                        8.4    13.0  )-----------( 111      ( 13 Feb 2018 04:28 )             24.2   02-13    131<L>  |  90<L>  |  90<H>  ----------------------------<  103<H>  5.1   |  19<L>  |  7.92<H>    Ca    8.1<L>      13 Feb 2018 04:28  Phos  7.9     02-13  Mg     2.6     02-13    TPro  6.2  /  Alb  2.7<L>  /  TBili  0.3  /  DBili  x   /  AST  41<H>  /  ALT  27  /  AlkPhos  65  02-13

## 2018-02-13 NOTE — PROGRESS NOTE ADULT - ASSESSMENT
77 year old man with past medical history of AAA 5.5cm s/p EVAAR repair on 1/29 with incidental finding of Right Neoplasm, previous history of Left Neoplasm with Left Nephrectomy in 2009, Bladder Cancer, Known LBBB, HTN, HLD presents with hypoxic respiratory failure 2/2 to pulmonary edema  2/2 ADHF + multifocal PNA in the setting of DONNIE on CKD with evidence of severe renal artery stenosis and TVD on cath    #Neuro: AOx2-3  Confused at times, likely delirium in the setting of hospital stay. Able to easily re-orient    #CV:  1) Pulmonary edema 2/2 to ADHF + volume overload in the setting of acute renal failure. Now resolved s/p dialysis  2) AAA 5.5cm s/p EVAR repair with stents 1/29 with Dr. Wyatt Young  [facility] with triple vessel disease, PCI planned after renal artery intervention. Continue with aspirin, bb, statin  TTE with evidence of severe LV dysfunction (EF 25-30%) with moderate AS, Given [facility] findings, likely ischemic cardiomyopathy.   -c/w hydralazine, isordil, low dose bb  for preload/afterload reduction. Continue to monitor oxy sat  -c/w ASA  -Afib with RVR: new onset, in the setting of sepsis, HF. No repeat episodes Continue with BB, uptitrate as tolerated. 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 Given recent hospitalization, will treat for HCAP for total of 7 days. Blood cx NGTD  Wean off oxygen as tolerated    #Renal:   1) DONNIE on CKD in the setting of recent surgery, iv contrast for imaging. Renal US significant for high grade stenosis at the origin of the renal artery. Plan for the Cath of the renal artery with likely stenting. Renal artery could not be engaged, unclear relation of stent structure to artery. Baseline SCr 1.2 - 1.3  2) L RCC s/p nephrectomy, R renal mass likely RCC recurrence  CTA aorta shows stent extends past R renal artery to SMA  -Volume overloaded and K elevated although specimen mildly hemolyzed. Monitor Scr, electrolytes, UOP.  -Percutaneous intervention for WOOD with interventional cardiology Dr. Heriberto Garg likely tomorrow, NPO at [facility] today    #Heme/Onc: Spoke with pt's outpatient urologist Dr. Santiago. He is aware of the lesion on pt's right kidney but says given its size, does not require intervention at this time and states we should prioritize treating potential ischemic disease. He will continue to monitor the mass as outpatient.   HIT score ~4-5. Intermediate probability of HIT  -Hematology following, pt will need outpatient hematology follow up at [facility] [phone] and at least 4 weeks of anticoagulation  -HIT ab positive. Awaiting PRADEEP for confirmation, c/w argatroban gtt  -trend LFTs every few days on Argatraban  -when PLT >150k, can bridge to coumadin over 5 days, INR 2-3    #ID: Pt initially presented with severe sepsis likely 2/2 to PNA with signs of end organ dysfunction (donnie, elevated trops) now improving s/p abx. WBC downtrending.  -Aztreonam (2/5 - 2/6)  -Continue to trend, dose vanc by level (2/5 - ), EOT 2/14. Goal serum level 15-20    #GI: DASH diet, no issues  -trend LFTs on Argatraban    DVT ppx: argatraban 77 year old man with past medical history of AAA 5.5cm s/p EVAAR repair on 1/29 with incidental finding of Right Neoplasm, previous history of Left Neoplasm with Left Nephrectomy in 2009, Bladder Cancer, Known LBBB, HTN, HLD presents with hypoxic respiratory failure 2/2 to pulmonary edema  2/2 ADHF + multifocal PNA in the setting of DONNIE on CKD with evidence of severe renal artery stenosis and TVD on cath    #Neuro: AOx2-3  Confused at times, likely delirium in the setting of hospital stay. Able to easily re-orient    #CV:  1) Pulmonary edema 2/2 to ADHF + volume overload in the setting of acute renal failure. Now resolved s/p dialysis  2) AAA 5.5cm s/p EVAR repair with stents 1/29 with Dr. Wyatt Young  [facility] with triple vessel disease, PCI planned after renal artery intervention. Continue with aspirin, bb, statin  TTE with evidence of severe LV dysfunction (EF 25-30%) with moderate AS, Given [facility] findings, likely ischemic cardiomyopathy.   -c/w hydralazine, isordil, low dose bb  for preload/afterload reduction. Continue to monitor oxy sat  -c/w ASA  -Afib with RVR: new onset, in the setting of sepsis, HF. No repeat episodes Continue with BB, uptitrate as tolerated. CHADSVASC score of 4, on Argatraban gtt  -HIT panel positive, on argatraban  -Monitor bleeding   -f/u groin staples when can remove from 1/29 ERAR    #Pulm:   1) Hospital acquired pneumonia  Evidence of multifocal pna on xray. Given recent hospitalization, will treat for HCAP for total of 10 days. Blood cx NGTD  -Wean off oxygen as tolerated  -Vancomycin dosed by level and aztreonam (2/5 - ), EOT 2/14    #Renal:   1) DONNIE on CKD in the setting of recent surgery, iv contrast for imaging. Renal US significant for high grade stenosis at the origin of the renal artery. Plan for the Cath of the renal artery with likely stenting. Renal artery could not be engaged, unclear relation of stent structure to artery. Baseline SCr 1.2 - 1.3  2) L RCC s/p nephrectomy, R renal mass likely RCC recurrence  CTA aorta shows stent extends past R renal artery to SMA  -Volume overloaded and K elevated although specimen mildly hemolyzed. Monitor Scr, electrolytes, UOP.  -Percutaneous intervention for WOOD with interventional cardiology Dr. Heriberto Garg likely tomorrow, NPO at [facility]  -HD today    #Heme/Onc: Spoke with pt's outpatient urologist Dr. Santiago. He is aware of the lesion on pt's right kidney but says given its size, does not require intervention at this time and states we should prioritize treating potential ischemic disease. He will continue to monitor the mass as outpatient.   HIT score ~4-5. 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 Awaiting PRADEEP for confirmation, c/w argatroban gtt  -trend LFTs every few days on Argatraban  -when PLT >150k, can bridge to coumadin over 5 days, INR 2-3    #ID: Pt initially presented with severe sepsis likely 2/2 to PNA with signs of end organ dysfunction (donnie, elevated trops) now improving s/p abx. WBC downtrending.  -Aztreonam (2/5 - ), EOT 2/14  -Continue to trend, dose vanc by level (2/5 - ), EOT 2/14. Goal serum level 15-20    #GI: DASH diet, no issues  -trend LFTs on Argatraban    DVT ppx: argatraban 77 year old man with past medical history of AAA 5.5cm s/p EVAAR repair on 1/29 with incidental finding of Right Neoplasm, previous history of Left Neoplasm with Left Nephrectomy in 2009, Bladder Cancer, Known LBBB, HTN, HLD presents with hypoxic respiratory failure 2/2 to pulmonary edema  2/2 ADHF + multifocal PNA in the setting of DONNIE on CKD with evidence of severe renal artery stenosis and TVD on cath    #Neuro: AOx2-3  Confused at times, likely delirium in the setting of hospital stay. Able to easily re-orient    #CV:  1) Pulmonary edema 2/2 to ADHF + volume overload in the setting of acute renal failure. Now resolved s/p dialysis  2) AAA 5.5cm s/p EVAR repair with stents 1/29 with Dr. Wyatt Young  [facility] with triple vessel disease, PCI planned after renal artery intervention. Continue with aspirin, bb, statin  TTE with evidence of severe LV dysfunction (EF 25-30%) with moderate AS, Given [facility] findings, likely ischemic cardiomyopathy.   -c/w hydralazine 100 q8, isordil 10 TID, toprol XL 50mg daily for preload/afterload reduction. Continue to monitor oxy sat/BP  -c/w ASA  -Afib with RVR: new onset, in the setting of sepsis, HF. No repeat episodes Continue with BB, uptitrate as tolerated. 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 Given recent hospitalization, will treat for HCAP for total of 10 days. Blood cx NGTD  -Wean off oxygen as tolerated  -Vancomycin dosed by level and aztreonam (2/5 - ), EOT 2/14    #Renal:   1) DONNIE on CKD in the setting of recent surgery, iv contrast for imaging. Renal US significant for high grade stenosis at the origin of the renal artery. Plan for the Cath of the renal artery with likely stenting. Renal artery could not be engaged, unclear relation of stent structure to artery. Baseline SCr 1.2 - 1.3  2) L RCC s/p nephrectomy, R renal mass likely RCC recurrence  CTA aorta shows stent extends past R renal artery to SMA  Monitor Scr, electrolytes, UOP. Removed 4L UF but still on HFNC  -Percutaneous intervention for WOOD with interventional cardiology Dr. Heriberto Garg likely tomorrow, NPO at [facility]  -UF before procedure so can lie flat, HD after    #Heme/Onc:   1) Heparin-induced thrombocytopenia  2) R renal mass likely RCC  Spoke with pt's outpatient urologist Dr. Santiago. He is aware of the lesion on pt's right kidney but says given its size, does not require intervention at this time and states we should prioritize treating potential ischemic disease. He will continue to monitor the mass as outpatient.   HIT score ~4-5, HIT panel positive, PRADEEP positive  -Hematology following, pt will need outpatient hematology follow up at [facility] [phone] and at least 4 weeks of anticoagulation  -c/w argatroban gtt, when PLT >150k, can bridge to coumadin over 5 days, INR 2-3  -trend LFTs every few days on Argatroban      #ID: Pt initially presented with severe sepsis likely 2/2 to PNA with signs of end organ dysfunction (donnie, elevated trops) now improving s/p abx. WBC downtrending.  -Aztreonam (2/5 - ), EOT 2/14  -Continue to trend, dose vanc by level (2/5 - ), EOT 2/14. Goal serum level 15-20    #GI: DASH diet, no issues  -trend LFTs on Argatroban    DVT ppx: argatroban

## 2018-02-13 NOTE — PROGRESS NOTE ADULT - PROBLEM SELECTOR PLAN 4
In setting of renal failure: Serum phosphorus is 7.9 today. C/w renagel 1600 mg TID with meals. Monitor serum phosphorus level.

## 2018-02-13 NOTE — PROGRESS NOTE ADULT - SUBJECTIVE AND OBJECTIVE BOX
Patient is a 77y old  Male who presents with a chief complaint of Shortness of Breath (04 Feb 2018 17:51)    Overnight events/Subjective:  Altered overnight. Vancomycin dosed 1250mg IV this AM. Off argatroban for IR procedure for the renal artery stenosis.  	  HPI:  This is a 77 year old man with past medical history of AAA 5.5cm s/p EVAAR repair with stents on 1/29 with incidental finding of Right Renal Neoplasm, previous history of Left Renal Neoplasm with Left Nephrectomy in 2008, Bladder Cancer, Known LBBB, HTN, HLD transferred from [facility] after initially presenting with SOB which began last night. Patient states that he was discharged from the hospital following AAA repair and recovering well.  He was walking with walker at home with no complaints.  After going to bed, he was awoken from his sleep complaining of SOB with no relief.  He went to [facility] wtih his SOB getting progressively worse with 1 episode of vomitus before going to hospital.  Patient denies fever, chills, recent sick contact, Chest pain, Abdominal pain, diarrhea.  At [place], paient with elevate d-dimers and transferred for possible PE.  At [facility], patient with  RICKI in V2-V4 with elevated Trops, and ADHF requiring Bipap. (04 Feb 2018 17:51)      MEDICATIONS  (STANDING):  argatroban Infusion 3 MICROgram(s)/kG/Min (12.24 mL/Hr) IV Continuous <Continuous>  aspirin enteric coated 81 milliGRAM(s) Oral daily  atorvastatin 80 milliGRAM(s) Oral at bedtime  docusate sodium 100 milliGRAM(s) Oral daily  hydrALAZINE 100 milliGRAM(s) Oral every 8 hours  isosorbide   dinitrate Tablet (ISORDIL) 10 milliGRAM(s) Oral three times a day  metoprolol succinate ER 50 milliGRAM(s) Oral daily  senna 1 Tablet(s) Oral at bedtime  sevelamer hydrochloride 1600 milliGRAM(s) Oral three times a day with meals  vancomycin  IVPB 1000 milliGRAM(s) IV Intermittent once    MEDICATIONS  (PRN):  diazepam    Tablet 10 milliGRAM(s) Oral two times a day PRN anxiety  polyethylene glycol 3350 17 Gram(s) Oral daily PRN Constipation      REVIEW OF SYSTEMS:  Otherwise, 10 point ROS done and otherwise negative.    PHYSICAL EXAM:  Vital Signs Last 24 Hrs  T(C): 36.2 (13 Feb 2018 06:00), Max: 37 (12 Feb 2018 08:00)  T(F): 97.2 (13 Feb 2018 06:00), Max: 98.6 (12 Feb 2018 08:00)  HR: 90 (13 Feb 2018 06:00) (82 - 98)  BP: 112/69 (12 Feb 2018 21:30) (112/69 - 112/69)  BP(mean): 83 (12 Feb 2018 21:30) (83 - 83)  RR: 21 (13 Feb 2018 06:00) (18 - 41)  SpO2: 95% (13 Feb 2018 06:00) (92% - 99%)  I&O's Summary    12 Feb 2018 07:01  -  13 Feb 2018 07:00  --------------------------------------------------------  IN: 640.6 mL / OUT: 0 mL / NET: 640.6 mL        Appearance: Normal	  HEENT:   Normal oral mucosa, PERRL, EOMI	  Lymphatic: No lymphadenopathy  Cardiovascular: Normal S1 S2, No JVD, No murmurs, No edema  Respiratory: Lungs clear to auscultation	  Psychiatry: A & O x 3, Mood & affect appropriate  Gastrointestinal:  Soft, Non-tender, + BS	  Skin: No rashes, No ecchymoses, No cyanosis	  Neurologic: Non-focal  Extremities: Normal range of motion, No clubbing, cyanosis or edema  Vascular: Peripheral pulses palpable 2+ bilaterally    LABS:	 	                        8.4    13.0  )-----------( 111      ( 13 Feb 2018 04:28 )             24.2     Auto Eosinophil # x     / Auto Eosinophil % x     / Auto Neutrophil # x     / Auto Neutrophil % x     / BANDS % x                            8.4    14.2  )-----------( 94       ( 12 Feb 2018 02:21 )             24.4     Auto Eosinophil # x     / Auto Eosinophil % x     / Auto Neutrophil # x     / Auto Neutrophil % x     / BANDS % x        INR: 2.68 ratio (02-13 @ 04:28)  INR: 2.63 ratio (02-12 @ 02:21)  INR: 2.99 ratio (02-11 @ 05:54)    02-13    131<L>  |  90<L>  |  90<H>  ----------------------------<  103<H>  5.1   |  19<L>  |  7.92<H>  02-12    134<L>  |  94<L>  |  44<H>  ----------------------------<  103<H>  4.0   |  23  |  4.95<H>    Ca    8.1<L>      13 Feb 2018 04:28  Mg     2.6     02-13  Phos  7.9     02-13  TPro  6.2  /  Alb  2.7<L>  /  TBili  0.3  /  DBili  x   /  AST  41<H>  /  ALT  27  /  AlkPhos  65  02-13  TPro  6.5  /  Alb  3.0<L>  /  TBili  0.5  /  DBili  x   /  AST  50<H>  /  ALT  38  /  AlkPhos  67  02-12    Lactate, Blood: 0.8 mmol/L (02-12 @ 02:21)  Lactate, Blood: 0.9 mmol/L (02-11 @ 05:54)      proBNP:   Lipid Profile: 02-04 Chol 145 LDL 87 HDL 26<L> Trig 162<H>  HgA1c: 5.4 % (02-04 @ 21:37)    TSH:     CARDIAC MARKERS:   12 Feb 2018 02:21 Troponin 0.62 ng/mL / Creatine Kinase 61 U/L /  CKMB 2.7 ng/mL / CPK Mass Assay % x       05 Feb 2018 05:05 Troponin 0.73 ng/mL / Creatine Kinase 156 U/L /  CKMB 14.0 ng/mL / CPK Mass Assay % 9.0 %   05 Feb 2018 01:17 Troponin 0.80 ng/mL / Creatine Kinase 158 U/L /  CKMB 15.2 ng/mL / CPK Mass Assay % 9.6 %        TELEMETRY: 	    ECG:  	  RADIOLOGY:  OTHER: 	    PREVIOUS DIAGNOSTIC TESTING:    [ ] Echocardiogram:  [ ]  Catheterization:  [ ] Stress Test: Patient is a 77y old  Male who presents with a chief complaint of Shortness of Breath (04 Feb 2018 17:51)    Overnight events/Subjective:  Anxious overnight, improved with valium 10mg. Vancomycin dosed 1250mg IV this AM. Off argatroban for IR procedure for the renal artery stenosis. UF yesterday.  	  HPI:  This is a 77 year old man with past medical history of AAA 5.5cm s/p EVAAR repair with stents on 1/29 with incidental finding of Right Renal Neoplasm, previous history of Left Renal Neoplasm with Left Nephrectomy in 2008, Bladder Cancer, Known LBBB, HTN, HLD transferred from [facility] after initially presenting with SOB which began last night. Patient states that he was discharged from the hospital following AAA repair and recovering well.  He was walking with walker at home with no complaints.  After going to bed, he was awoken from his sleep complaining of SOB with no relief.  He went to [facility] wtih his SOB getting progressively worse with 1 episode of vomitus before going to hospital.  Patient denies fever, chills, recent sick contact, Chest pain, Abdominal pain, diarrhea.  At [place], paient with elevate d-dimers and transferred for possible PE.  At [facility], patient with  RICKI in V2-V4 with elevated Trops, and ADHF requiring Bipap. (04 Feb 2018 17:51)      MEDICATIONS  (STANDING):  argatroban Infusion 3 MICROgram(s)/kG/Min (12.24 mL/Hr) IV Continuous <Continuous>  aspirin enteric coated 81 milliGRAM(s) Oral daily  atorvastatin 80 milliGRAM(s) Oral at bedtime  docusate sodium 100 milliGRAM(s) Oral daily  hydrALAZINE 100 milliGRAM(s) Oral every 8 hours  isosorbide   dinitrate Tablet (ISORDIL) 10 milliGRAM(s) Oral three times a day  metoprolol succinate ER 50 milliGRAM(s) Oral daily  senna 1 Tablet(s) Oral at bedtime  sevelamer hydrochloride 1600 milliGRAM(s) Oral three times a day with meals  vancomycin  IVPB 1000 milliGRAM(s) IV Intermittent once    MEDICATIONS  (PRN):  diazepam    Tablet 10 milliGRAM(s) Oral two times a day PRN anxiety  polyethylene glycol 3350 17 Gram(s) Oral daily PRN Constipation      REVIEW OF SYSTEMS:  Otherwise, 10 point ROS done and otherwise negative.    PHYSICAL EXAM:  Vital Signs Last 24 Hrs  T(C): 36.2 (13 Feb 2018 06:00), Max: 37 (12 Feb 2018 08:00)  T(F): 97.2 (13 Feb 2018 06:00), Max: 98.6 (12 Feb 2018 08:00)  HR: 90 (13 Feb 2018 06:00) (82 - 98)  BP: 112/69 (12 Feb 2018 21:30) (112/69 - 112/69)  BP(mean): 83 (12 Feb 2018 21:30) (83 - 83)  RR: 21 (13 Feb 2018 06:00) (18 - 41)  SpO2: 95% (13 Feb 2018 06:00) (92% - 99%)  I&O's Summary    12 Feb 2018 07:01  -  13 Feb 2018 07:00  --------------------------------------------------------  IN: 640.6 mL / OUT: 0 mL / NET: 640.6 mL        Appearance: Normal	  HEENT:   Normal oral mucosa, PERRL, EOMI	  Lymphatic: No lymphadenopathy  Cardiovascular: Normal S1 S2, No JVD, No murmurs, No edema  Respiratory: Lungs clear to auscultation	  Psychiatry: A & O x 3, Mood & affect appropriate  Gastrointestinal:  Soft, Non-tender, + BS	  Skin: No rashes, No ecchymoses, No cyanosis	  Neurologic: Non-focal  Extremities: Normal range of motion, No clubbing, cyanosis or edema  Vascular: Peripheral pulses palpable 2+ bilaterally    LABS:	 	                        8.4    13.0  )-----------( 111      ( 13 Feb 2018 04:28 )             24.2     Auto Eosinophil # x     / Auto Eosinophil % x     / Auto Neutrophil # x     / Auto Neutrophil % x     / BANDS % x                            8.4    14.2  )-----------( 94       ( 12 Feb 2018 02:21 )             24.4     Auto Eosinophil # x     / Auto Eosinophil % x     / Auto Neutrophil # x     / Auto Neutrophil % x     / BANDS % x        INR: 2.68 ratio (02-13 @ 04:28)  INR: 2.63 ratio (02-12 @ 02:21)  INR: 2.99 ratio (02-11 @ 05:54)    02-13    131<L>  |  90<L>  |  90<H>  ----------------------------<  103<H>  5.1   |  19<L>  |  7.92<H>  02-12    134<L>  |  94<L>  |  44<H>  ----------------------------<  103<H>  4.0   |  23  |  4.95<H>    Ca    8.1<L>      13 Feb 2018 04:28  Mg     2.6     02-13  Phos  7.9     02-13  TPro  6.2  /  Alb  2.7<L>  /  TBili  0.3  /  DBili  x   /  AST  41<H>  /  ALT  27  /  AlkPhos  65  02-13  TPro  6.5  /  Alb  3.0<L>  /  TBili  0.5  /  DBili  x   /  AST  50<H>  /  ALT  38  /  AlkPhos  67  02-12    Lactate, Blood: 0.8 mmol/L (02-12 @ 02:21)  Lactate, Blood: 0.9 mmol/L (02-11 @ 05:54)      proBNP:   Lipid Profile: 02-04 Chol 145 LDL 87 HDL 26<L> Trig 162<H>  HgA1c: 5.4 % (02-04 @ 21:37)    TSH:     CARDIAC MARKERS:   12 Feb 2018 02:21 Troponin 0.62 ng/mL / Creatine Kinase 61 U/L /  CKMB 2.7 ng/mL / CPK Mass Assay % x       05 Feb 2018 05:05 Troponin 0.73 ng/mL / Creatine Kinase 156 U/L /  CKMB 14.0 ng/mL / CPK Mass Assay % 9.0 %   05 Feb 2018 01:17 Troponin 0.80 ng/mL / Creatine Kinase 158 U/L /  CKMB 15.2 ng/mL / CPK Mass Assay % 9.6 %        TELEMETRY: 	    ECG:  	  RADIOLOGY:  OTHER: 	    PREVIOUS DIAGNOSTIC TESTING:    [ ] Echocardiogram:  [ ]  Catheterization:  [ ] Stress Test: Patient is a 77y old  Male who presents with a chief complaint of Shortness of Breath (04 Feb 2018 17:51)    Overnight events/Subjective:  Anxious overnight, improved with valium 10mg. Vancomycin dosed 1250mg IV this AM. Off argatroban for IR procedure for the renal artery stenosis. UF yesterday, 2L removed.  	  HPI:  This is a 77 year old man with past medical history of AAA 5.5cm s/p EVAAR repair with stents on 1/29 with incidental finding of Right Renal Neoplasm, previous history of Left Renal Neoplasm with Left Nephrectomy in 2008, Bladder Cancer, Known LBBB, HTN, HLD transferred from [facility] after initially presenting with SOB which began last night. Patient states that he was discharged from the hospital following AAA repair and recovering well.  He was walking with walker at home with no complaints.  After going to bed, he was awoken from his sleep complaining of SOB with no relief.  He went to [facility] wtih his SOB getting progressively worse with 1 episode of vomitus before going to hospital.  Patient denies fever, chills, recent sick contact, Chest pain, Abdominal pain, diarrhea.  At [place], paient with elevate d-dimers and transferred for possible PE.  At [facility], patient with  RICKI in V2-V4 with elevated Trops, and ADHF requiring Bipap. (04 Feb 2018 17:51)      MEDICATIONS  (STANDING):  argatroban Infusion 3 MICROgram(s)/kG/Min (12.24 mL/Hr) IV Continuous <Continuous>  aspirin enteric coated 81 milliGRAM(s) Oral daily  atorvastatin 80 milliGRAM(s) Oral at bedtime  docusate sodium 100 milliGRAM(s) Oral daily  hydrALAZINE 100 milliGRAM(s) Oral every 8 hours  isosorbide   dinitrate Tablet (ISORDIL) 10 milliGRAM(s) Oral three times a day  metoprolol succinate ER 50 milliGRAM(s) Oral daily  senna 1 Tablet(s) Oral at bedtime  sevelamer hydrochloride 1600 milliGRAM(s) Oral three times a day with meals  vancomycin  IVPB 1000 milliGRAM(s) IV Intermittent once    MEDICATIONS  (PRN):  diazepam    Tablet 10 milliGRAM(s) Oral two times a day PRN anxiety  polyethylene glycol 3350 17 Gram(s) Oral daily PRN Constipation      REVIEW OF SYSTEMS:  Otherwise, 10 point ROS done and otherwise negative.    PHYSICAL EXAM:  Vital Signs Last 24 Hrs  T(C): 36.2 (13 Feb 2018 06:00), Max: 37 (12 Feb 2018 08:00)  T(F): 97.2 (13 Feb 2018 06:00), Max: 98.6 (12 Feb 2018 08:00)  HR: 90 (13 Feb 2018 06:00) (82 - 98)  BP: 112/69 (12 Feb 2018 21:30) (112/69 - 112/69)  BP(mean): 83 (12 Feb 2018 21:30) (83 - 83)  RR: 21 (13 Feb 2018 06:00) (18 - 41)  SpO2: 95% (13 Feb 2018 06:00) (92% - 99%)  I&O's Summary    12 Feb 2018 07:01  -  13 Feb 2018 07:00  --------------------------------------------------------  IN: 640.6 mL / OUT: 0 mL / NET: 640.6 mL        Appearance: Normal	  HEENT:   Normal oral mucosa, PERRL, EOMI	  Lymphatic: No lymphadenopathy  Cardiovascular: Normal S1 S2, No JVD, No murmurs, No edema  Respiratory: Lungs clear to auscultation	  Psychiatry: A & O x 3, Mood & affect appropriate  Gastrointestinal:  Soft, Non-tender, + BS	  Skin: No rashes, No ecchymoses, No cyanosis	  Neurologic: Non-focal  Extremities: Normal range of motion, No clubbing, cyanosis or edema  Vascular: Peripheral pulses palpable 2+ bilaterally    LABS:	 	                        8.4    13.0  )-----------( 111      ( 13 Feb 2018 04:28 )             24.2     Auto Eosinophil # x     / Auto Eosinophil % x     / Auto Neutrophil # x     / Auto Neutrophil % x     / BANDS % x                            8.4    14.2  )-----------( 94       ( 12 Feb 2018 02:21 )             24.4     Auto Eosinophil # x     / Auto Eosinophil % x     / Auto Neutrophil # x     / Auto Neutrophil % x     / BANDS % x        INR: 2.68 ratio (02-13 @ 04:28)  INR: 2.63 ratio (02-12 @ 02:21)  INR: 2.99 ratio (02-11 @ 05:54)    02-13    131<L>  |  90<L>  |  90<H>  ----------------------------<  103<H>  5.1   |  19<L>  |  7.92<H>  02-12    134<L>  |  94<L>  |  44<H>  ----------------------------<  103<H>  4.0   |  23  |  4.95<H>    Ca    8.1<L>      13 Feb 2018 04:28  Mg     2.6     02-13  Phos  7.9     02-13  TPro  6.2  /  Alb  2.7<L>  /  TBili  0.3  /  DBili  x   /  AST  41<H>  /  ALT  27  /  AlkPhos  65  02-13  TPro  6.5  /  Alb  3.0<L>  /  TBili  0.5  /  DBili  x   /  AST  50<H>  /  ALT  38  /  AlkPhos  67  02-12    Lactate, Blood: 0.8 mmol/L (02-12 @ 02:21)  Lactate, Blood: 0.9 mmol/L (02-11 @ 05:54)      proBNP:   Lipid Profile: 02-04 Chol 145 LDL 87 HDL 26<L> Trig 162<H>  HgA1c: 5.4 % (02-04 @ 21:37)    TSH:     CARDIAC MARKERS:   12 Feb 2018 02:21 Troponin 0.62 ng/mL / Creatine Kinase 61 U/L /  CKMB 2.7 ng/mL / CPK Mass Assay % x       05 Feb 2018 05:05 Troponin 0.73 ng/mL / Creatine Kinase 156 U/L /  CKMB 14.0 ng/mL / CPK Mass Assay % 9.0 %   05 Feb 2018 01:17 Troponin 0.80 ng/mL / Creatine Kinase 158 U/L /  CKMB 15.2 ng/mL / CPK Mass Assay % 9.6 %        TELEMETRY: 	    ECG:  	  RADIOLOGY:  OTHER: 	    PREVIOUS DIAGNOSTIC TESTING:    [ ] Echocardiogram:  [ ]  Catheterization:  [ ] Stress Test: Patient is a 77y old  Male who presents with a chief complaint of Shortness of Breath (04 Feb 2018 17:51)    Overnight events/Subjective:  Anxious overnight, improved with valium 10mg. Vancomycin dosed 1250mg IV this AM. UF yesterday, 2L removed. Denies CP, SOB, dizziness.  	  HPI:  This is a 77 year old man with past medical history of AAA 5.5cm s/p EVAAR repair with stents on 1/29 with incidental finding of Right Renal Neoplasm, previous history of Left Renal Neoplasm with Left Nephrectomy in 2008, Bladder Cancer, Known LBBB, HTN, HLD transferred from [facility] after initially presenting with SOB which began last night. Patient states that he was discharged from the hospital following AAA repair and recovering well.  He was walking with walker at home with no complaints.  After going to bed, he was awoken from his sleep complaining of SOB with no relief.  He went to [facility] wtih his SOB getting progressively worse with 1 episode of vomitus before going to hospital.  Patient denies fever, chills, recent sick contact, Chest pain, Abdominal pain, diarrhea.  At [place], paient with elevate d-dimers and transferred for possible PE.  At [facility], patient with  RICKI in V2-V4 with elevated Trops, and ADHF requiring Bipap. (04 Feb 2018 17:51)      MEDICATIONS  (STANDING):  argatroban Infusion 3 MICROgram(s)/kG/Min (12.24 mL/Hr) IV Continuous <Continuous>  aspirin enteric coated 81 milliGRAM(s) Oral daily  atorvastatin 80 milliGRAM(s) Oral at bedtime  docusate sodium 100 milliGRAM(s) Oral daily  hydrALAZINE 100 milliGRAM(s) Oral every 8 hours  isosorbide   dinitrate Tablet (ISORDIL) 10 milliGRAM(s) Oral three times a day  metoprolol succinate ER 50 milliGRAM(s) Oral daily  senna 1 Tablet(s) Oral at bedtime  sevelamer hydrochloride 1600 milliGRAM(s) Oral three times a day with meals  vancomycin  IVPB 1000 milliGRAM(s) IV Intermittent once    MEDICATIONS  (PRN):  diazepam    Tablet 10 milliGRAM(s) Oral two times a day PRN anxiety  polyethylene glycol 3350 17 Gram(s) Oral daily PRN Constipation      REVIEW OF SYSTEMS:  Otherwise, 10 point ROS done and otherwise negative.    PHYSICAL EXAM:  Vital Signs Last 24 Hrs  T(C): 36.2 (13 Feb 2018 06:00), Max: 37 (12 Feb 2018 08:00)  T(F): 97.2 (13 Feb 2018 06:00), Max: 98.6 (12 Feb 2018 08:00)  HR: 90 (13 Feb 2018 06:00) (82 - 98)  BP: 112/69 (12 Feb 2018 21:30) (112/69 - 112/69)  BP(mean): 83 (12 Feb 2018 21:30) (83 - 83)  RR: 21 (13 Feb 2018 06:00) (18 - 41)  SpO2: 95% (13 Feb 2018 06:00) (92% - 99%)  I&O's Summary    12 Feb 2018 07:01  -  13 Feb 2018 07:00  --------------------------------------------------------  IN: 640.6 mL / OUT: 0 mL / NET: 640.6 mL        Appearance: Normal	  HEENT:   Normal oral mucosa, PERRL, EOMI	  Lymphatic: No lymphadenopathy  Cardiovascular: Normal S1 S2, No JVD, No murmurs, No edema  Respiratory: Few crackles on auscultation	  Psychiatry: A & O x 3, affect appropriate, mood dysthymic  Gastrointestinal:  Soft, Non-tender, + BS	  Skin: No rashes, No ecchymoses, No cyanosis	  Neurologic: Non-focal  Extremities: Normal range of motion, No clubbing, cyanosis or edema  Vascular: Peripheral pulses palpable 2+ bilaterally    LABS:	 	                        8.4    13.0  )-----------( 111      ( 13 Feb 2018 04:28 )             24.2     Auto Eosinophil # x     / Auto Eosinophil % x     / Auto Neutrophil # x     / Auto Neutrophil % x     / BANDS % x                            8.4    14.2  )-----------( 94       ( 12 Feb 2018 02:21 )             24.4     Auto Eosinophil # x     / Auto Eosinophil % x     / Auto Neutrophil # x     / Auto Neutrophil % x     / BANDS % x        INR: 2.68 ratio (02-13 @ 04:28)  INR: 2.63 ratio (02-12 @ 02:21)  INR: 2.99 ratio (02-11 @ 05:54)    02-13    131<L>  |  90<L>  |  90<H>  ----------------------------<  103<H>  5.1   |  19<L>  |  7.92<H>  02-12    134<L>  |  94<L>  |  44<H>  ----------------------------<  103<H>  4.0   |  23  |  4.95<H>    Ca    8.1<L>      13 Feb 2018 04:28  Mg     2.6     02-13  Phos  7.9     02-13  TPro  6.2  /  Alb  2.7<L>  /  TBili  0.3  /  DBili  x   /  AST  41<H>  /  ALT  27  /  AlkPhos  65  02-13  TPro  6.5  /  Alb  3.0<L>  /  TBili  0.5  /  DBili  x   /  AST  50<H>  /  ALT  38  /  AlkPhos  67  02-12    Lactate, Blood: 0.8 mmol/L (02-12 @ 02:21)  Lactate, Blood: 0.9 mmol/L (02-11 @ 05:54)      proBNP:   Lipid Profile: 02-04 Chol 145 LDL 87 HDL 26<L> Trig 162<H>  HgA1c: 5.4 % (02-04 @ 21:37)    TSH:     CARDIAC MARKERS:   12 Feb 2018 02:21 Troponin 0.62 ng/mL / Creatine Kinase 61 U/L /  CKMB 2.7 ng/mL / CPK Mass Assay % x       05 Feb 2018 05:05 Troponin 0.73 ng/mL / Creatine Kinase 156 U/L /  CKMB 14.0 ng/mL / CPK Mass Assay % 9.0 %   05 Feb 2018 01:17 Troponin 0.80 ng/mL / Creatine Kinase 158 U/L /  CKMB 15.2 ng/mL / CPK Mass Assay % 9.6 %        TELEMETRY: 	    ECG:  	  RADIOLOGY:  OTHER: 	    PREVIOUS DIAGNOSTIC TESTING:    [ ] Echocardiogram:  [ ]  Catheterization:  [ ] Stress Test:

## 2018-02-13 NOTE — CHART NOTE - NSCHARTNOTEFT_GEN_A_CORE
The patient had angiogram today. Based upon this morning's labs there is no emergent HD indication recommend checking another set of labs.  If there is indication for urgent HD would proceed.  Otherwise HD in AM. The patient had angiogram today. Based upon this morning's labs there is no emergent HD indication recommend checking another set of labs.  If there is indication for urgent HD would proceed.  Otherwise HD in AM.    Addendum: reviewed labs.  potassium rising, BUN rising.  patient had contrast today and has near zero clearance. prudent to dialyze tonight as labs in the morning would likely be much CCU team aware

## 2018-02-13 NOTE — PROGRESS NOTE ADULT - PROBLEM SELECTOR PLAN 1
Patient with DONNIE on CKD in setting of cardiorenal syndrome and ACE-I use and now hemodynamically significant WOOD and ARB use: Baseline creatinine is between 1.2-1.3. Scr. on presentation was 6.48. Complements noted to be WNL.  Patient was initiated on CVVHDF on 2/5/18 and transitioned to IHD. Patient had PUF treatment yesterday. Patient remains anuric. CT angiogram was done which showed significant right renal artery stenosis.  Discussed with CCU plan for percutaneous intervention today. Will do 2 hour UF today pre procedure and 2 hour HD post procedure as well. Monitor BMP daily. Patient with DONNIE on CKD in setting of cardiorenal syndrome and hemodynamically significant WOOD and ARB use: Baseline creatinine is between 1.2-1.3. Scr. on presentation was 6.48. Complements noted to be WNL.  Patient was initiated on CVVHDF on 2/5/18 and transitioned to IHD. Patient had PUF treatment yesterday. Patient remains anuric. CT angiogram was done which showed significant right renal artery stenosis.  Discussed with CCU plan for percutaneous intervention today. Will do 2 hour UF today pre procedure and 2 hour HD post procedure as well. Monitor BMP daily.

## 2018-02-13 NOTE — PROGRESS NOTE ADULT - ASSESSMENT
77 year old man with past medical history of AAA 5.5cm s/p EVAAR repair on 1/29 with incidental finding of Right Neoplasm, previous history of Left Neoplasm with Left Nephrectomy in 2009, Bladder Cancer, Known LBBB, HTN, HLD presents with hypoxic respiratory failure 2/2 to pulmonary edema  2/2 ADHF + multifocal PNA in the setting of DONNIE on CKD with evidence of severe renal artery stenosis and TVD on cath    1. AAA s/p EVAR on 1/29: Evidence of renal artery stenosis and renal failure s/p EVAR. Will re- attempt intervention today to relieve the renal artery stenosis.  We discussed with Nephrology and the patient will have dialysis between 11am-1PM and then have the procedure at 1:30pm.    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 On argatroban. Argatroban should be held 2 hours prior to the procedure.     4. CAD: triple vessel disease, PCI planned after renal artery intervention.  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 Afib: CHADSVASC score of 4, On Argatraban

## 2018-02-13 NOTE — CHART NOTE - NSCHARTNOTEFT_GEN_A_CORE
Hematology service    Patient seen and examined at bedside. Serotonin release assay positive. Please continue the argatroban. Once PLT count greater than 150k he can be bridged to warfarin therapy, unless more interventions planned.     Call/page with questions. [phone]    Jeannette Loyola  Hematology Fellow

## 2018-02-14 LAB
ALBUMIN SERPL ELPH-MCNC: 2.7 G/DL — LOW (ref 3.3–5)
ALP SERPL-CCNC: 60 U/L — SIGNIFICANT CHANGE UP (ref 40–120)
ALT FLD-CCNC: 23 U/L RC — SIGNIFICANT CHANGE UP (ref 10–45)
ANION GAP SERPL CALC-SCNC: 19 MMOL/L — HIGH (ref 5–17)
APTT BLD: 39.7 SEC — HIGH (ref 27.5–37.4)
APTT BLD: 53.3 SEC — HIGH (ref 27.5–37.4)
APTT BLD: 57.7 SEC — HIGH (ref 27.5–37.4)
AST SERPL-CCNC: 31 U/L — SIGNIFICANT CHANGE UP (ref 10–40)
BASOPHILS # BLD AUTO: 0 K/UL — SIGNIFICANT CHANGE UP (ref 0–0.2)
BASOPHILS NFR BLD AUTO: 0.2 % — SIGNIFICANT CHANGE UP (ref 0–2)
BILIRUB SERPL-MCNC: 0.3 MG/DL — SIGNIFICANT CHANGE UP (ref 0.2–1.2)
BLD GP AB SCN SERPL QL: NEGATIVE — SIGNIFICANT CHANGE UP
BUN SERPL-MCNC: 75 MG/DL — HIGH (ref 7–23)
CALCIUM SERPL-MCNC: 8.1 MG/DL — LOW (ref 8.4–10.5)
CHLORIDE SERPL-SCNC: 90 MMOL/L — LOW (ref 96–108)
CO2 SERPL-SCNC: 22 MMOL/L — SIGNIFICANT CHANGE UP (ref 22–31)
CREAT SERPL-MCNC: 7.11 MG/DL — HIGH (ref 0.5–1.3)
EOSINOPHIL # BLD AUTO: 0.3 K/UL — SIGNIFICANT CHANGE UP (ref 0–0.5)
EOSINOPHIL NFR BLD AUTO: 2.2 % — SIGNIFICANT CHANGE UP (ref 0–6)
GLUCOSE SERPL-MCNC: 98 MG/DL — SIGNIFICANT CHANGE UP (ref 70–99)
HCT VFR BLD CALC: 20.8 % — CRITICAL LOW (ref 39–50)
HCT VFR BLD CALC: 23.1 % — LOW (ref 39–50)
HGB BLD-MCNC: 7.1 G/DL — LOW (ref 13–17)
HGB BLD-MCNC: 7.6 G/DL — LOW (ref 13–17)
INR BLD: 1.45 RATIO — HIGH (ref 0.88–1.16)
INR BLD: 1.78 RATIO — HIGH (ref 0.88–1.16)
LYMPHOCYTES # BLD AUTO: 0.7 K/UL — LOW (ref 1–3.3)
LYMPHOCYTES # BLD AUTO: 6.4 % — LOW (ref 13–44)
MAGNESIUM SERPL-MCNC: 2.4 MG/DL — SIGNIFICANT CHANGE UP (ref 1.6–2.6)
MCHC RBC-ENTMCNC: 31.2 PG — SIGNIFICANT CHANGE UP (ref 27–34)
MCHC RBC-ENTMCNC: 32.3 PG — SIGNIFICANT CHANGE UP (ref 27–34)
MCHC RBC-ENTMCNC: 32.9 GM/DL — SIGNIFICANT CHANGE UP (ref 32–36)
MCHC RBC-ENTMCNC: 34.3 GM/DL — SIGNIFICANT CHANGE UP (ref 32–36)
MCV RBC AUTO: 94.3 FL — SIGNIFICANT CHANGE UP (ref 80–100)
MCV RBC AUTO: 94.8 FL — SIGNIFICANT CHANGE UP (ref 80–100)
MONOCYTES # BLD AUTO: 0.5 K/UL — SIGNIFICANT CHANGE UP (ref 0–0.9)
MONOCYTES NFR BLD AUTO: 4.2 % — SIGNIFICANT CHANGE UP (ref 2–14)
NEUTROPHILS # BLD AUTO: 10.1 K/UL — HIGH (ref 1.8–7.4)
NEUTROPHILS NFR BLD AUTO: 87 % — HIGH (ref 43–77)
PHOSPHATE SERPL-MCNC: 7.1 MG/DL — HIGH (ref 2.5–4.5)
PLATELET # BLD AUTO: 72 K/UL — LOW (ref 150–400)
PLATELET # BLD AUTO: 82 K/UL — LOW (ref 150–400)
POTASSIUM SERPL-MCNC: 4.7 MMOL/L — SIGNIFICANT CHANGE UP (ref 3.5–5.3)
POTASSIUM SERPL-SCNC: 4.7 MMOL/L — SIGNIFICANT CHANGE UP (ref 3.5–5.3)
PROT SERPL-MCNC: 6.4 G/DL — SIGNIFICANT CHANGE UP (ref 6–8.3)
PROTHROM AB SERPL-ACNC: 15.9 SEC — HIGH (ref 9.8–12.7)
PROTHROM AB SERPL-ACNC: 19.6 SEC — HIGH (ref 9.8–12.7)
RBC # BLD: 2.21 M/UL — LOW (ref 4.2–5.8)
RBC # BLD: 2.44 M/UL — LOW (ref 4.2–5.8)
RBC # FLD: 12.2 % — SIGNIFICANT CHANGE UP (ref 10.3–14.5)
RBC # FLD: 12.3 % — SIGNIFICANT CHANGE UP (ref 10.3–14.5)
RH IG SCN BLD-IMP: POSITIVE — SIGNIFICANT CHANGE UP
SODIUM SERPL-SCNC: 131 MMOL/L — LOW (ref 135–145)
WBC # BLD: 11.2 K/UL — HIGH (ref 3.8–10.5)
WBC # BLD: 11.6 K/UL — HIGH (ref 3.8–10.5)
WBC # FLD AUTO: 11.2 K/UL — HIGH (ref 3.8–10.5)
WBC # FLD AUTO: 11.6 K/UL — HIGH (ref 3.8–10.5)

## 2018-02-14 PROCEDURE — 93010 ELECTROCARDIOGRAM REPORT: CPT

## 2018-02-14 PROCEDURE — 99233 SBSQ HOSP IP/OBS HIGH 50: CPT

## 2018-02-14 PROCEDURE — 99291 CRITICAL CARE FIRST HOUR: CPT

## 2018-02-14 PROCEDURE — 99232 SBSQ HOSP IP/OBS MODERATE 35: CPT | Mod: GC

## 2018-02-14 PROCEDURE — 86923 COMPATIBILITY TEST ELECTRIC: CPT

## 2018-02-14 PROCEDURE — 93308 TTE F-UP OR LMTD: CPT | Mod: 26

## 2018-02-14 PROCEDURE — 99222 1ST HOSP IP/OBS MODERATE 55: CPT

## 2018-02-14 PROCEDURE — 93005 ELECTROCARDIOGRAM TRACING: CPT

## 2018-02-14 PROCEDURE — 93321 DOPPLER ECHO F-UP/LMTD STD: CPT | Mod: 26

## 2018-02-14 RX ORDER — VANCOMYCIN HCL 1 G
1000 VIAL (EA) INTRAVENOUS ONCE
Qty: 0 | Refills: 0 | Status: COMPLETED | OUTPATIENT
Start: 2018-02-14 | End: 2018-02-14

## 2018-02-14 RX ORDER — ARGATROBAN 50 MG/50ML
2 INJECTION, SOLUTION INTRAVENOUS
Qty: 250 | Refills: 0 | Status: DISCONTINUED | OUTPATIENT
Start: 2018-02-14 | End: 2018-02-15

## 2018-02-14 RX ORDER — ARGATROBAN 50 MG/50ML
2 INJECTION, SOLUTION INTRAVENOUS
Qty: 250 | Refills: 0 | Status: DISCONTINUED | OUTPATIENT
Start: 2018-02-14 | End: 2018-02-14

## 2018-02-14 RX ORDER — FENTANYL CITRATE 50 UG/ML
50 INJECTION INTRAVENOUS ONCE
Qty: 0 | Refills: 0 | Status: DISCONTINUED | OUTPATIENT
Start: 2018-02-14 | End: 2018-02-13

## 2018-02-14 RX ORDER — VASOPRESSIN 20 [USP'U]/ML
0.04 INJECTION INTRAVENOUS
Qty: 100 | Refills: 0 | Status: DISCONTINUED | OUTPATIENT
Start: 2018-02-14 | End: 2018-02-14

## 2018-02-14 RX ADMIN — ARGATROBAN 8.16 MICROGRAM(S)/KG/MIN: 50 INJECTION, SOLUTION INTRAVENOUS at 05:45

## 2018-02-14 RX ADMIN — Medication 50 MILLIGRAM(S): at 11:27

## 2018-02-14 RX ADMIN — ATORVASTATIN CALCIUM 80 MILLIGRAM(S): 80 TABLET, FILM COATED ORAL at 22:03

## 2018-02-14 RX ADMIN — ISOSORBIDE DINITRATE 10 MILLIGRAM(S): 5 TABLET ORAL at 22:03

## 2018-02-14 RX ADMIN — Medication 100 MILLIGRAM(S): at 06:05

## 2018-02-14 RX ADMIN — Medication 250 MILLIGRAM(S): at 03:11

## 2018-02-14 RX ADMIN — SENNA PLUS 1 TABLET(S): 8.6 TABLET ORAL at 22:03

## 2018-02-14 RX ADMIN — Medication 100 MILLIGRAM(S): at 22:03

## 2018-02-14 RX ADMIN — Medication 81 MILLIGRAM(S): at 11:29

## 2018-02-14 RX ADMIN — ISOSORBIDE DINITRATE 10 MILLIGRAM(S): 5 TABLET ORAL at 06:05

## 2018-02-14 RX ADMIN — Medication 50 MILLIGRAM(S): at 06:05

## 2018-02-14 RX ADMIN — ARGATROBAN 8.16 MICROGRAM(S)/KG/MIN: 50 INJECTION, SOLUTION INTRAVENOUS at 09:45

## 2018-02-14 NOTE — CONSULT NOTE ADULT - ASSESSMENT
77 year old man with past medical history of AAA 5.5cm s/p EVAAR repair with stents on 1/29 with incidental finding of Right Renal Neoplasm, previous history of Left Renal Neoplasm with Left Nephrectomy in 2008, Bladder Cancer, Known LBBB, HTN, HLD transferred from [facility] after initially presenting with SOB.     His course during this hospitalization was complicated with hypoxic respiratory failure secondary to pulmonary edema, ADHF, multifocal PNA, DONNIE requiring CVVHDF. He was started on heparin drip due to elevated troponins and ST-changes. 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 Found to have severe renal artery stenosis and Triple vessel disease on cath, PCI planned after renal artery intervention. He developed Afib with RVR: new onset , however, renal artery could not be engaged. Patient has been on treatment for PNA. On vanco/ aztreonam. Leukocytosis improving. Remains afebrile. Has an allergy to PCN, but does not remember the reaction. States it was a long time ago. Also, does not remember reactions to cipro/ levaquin.    Recommend:  -Multifocal pna - completed a 10-day course. Can discontinue today. Monitor off antibiotics.  -ID evaluation for permacath placement - would check blood cxs x 2 sets prior to placement.  -Continue to trend WBC and temperature curve.  -Will follow along with you.    Rico Bills MD  Pager [phone]  After 5pm/weekends call [phone] 77 year old man with past medical history of AAA 5.5cm s/p EVAAR repair with stents on 1/29 with incidental finding of Right Renal Neoplasm, previous history of Left Renal Neoplasm with Left Nephrectomy in 2008, Bladder Cancer, Known LBBB, HTN, HLD transferred from [facility] after initially presenting with SOB.     His course during this hospitalization was complicated with hypoxic respiratory failure secondary to pulmonary edema, ADHF, multifocal PNA, DONNIE requiring CVVHDF. He was started on heparin drip due to elevated troponins and ST-changes. 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 Found to have severe renal artery stenosis and Triple vessel disease on cath, PCI planned after renal artery intervention. He developed Afib with RVR: new onset , however, renal artery could not be engaged. Patient has been on treatment for PNA. On vanco/ aztreonam. Leukocytosis improving. Remains afebrile. Has an allergy to PCN, but does not remember the reaction. States it was a long time ago. Also, does not remember reactions to cipro/ levaquin.    Recommend:      Rico Bills MD  Pager [phone]  After 5pm/weekends call [phone] 77 year old man with past medical history of AAA 5.5cm s/p EVAAR repair with stents on 1/29 with incidental finding of Right Renal Neoplasm, previous history of Left Renal Neoplasm with Left Nephrectomy in 2008, Bladder Cancer, Known LBBB, HTN, HLD transferred from [facility] after initially presenting with SOB.     His course during this hospitalization was complicated with hypoxic respiratory failure secondary to pulmonary edema, ADHF, multifocal PNA, DONNIE requiring CVVHDF. He was started on heparin drip due to elevated troponins and ST-changes. 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 Found to have severe renal artery stenosis and Triple vessel disease on cath, PCI planned after renal artery intervention. He developed Afib with RVR: new onset , however, renal artery could not be engaged. Patient has been on treatment for PNA. On vanco/ aztreonam. Leukocytosis improving. Remains afebrile. Has an allergy to PCN, but does not remember the reaction. States it was a long time ago. Also, does not remember reactions to cipro/ levaquin.    Recommend:  -Multifocal PNA on day 10. Can complete antibiotics today.  -From an ID standpoint, patient is hemodynamically stable, afebrile, leukocytosis improving no objection for permacath placement.    Rico Bills MD  Pager [phone]  After 5pm/weekends call [phone]

## 2018-02-14 NOTE — PROGRESS NOTE ADULT - PROBLEM SELECTOR PLAN 1
Patient with DONNIE on CKD in setting of significant renal artery occlusion: Baseline creatinine is between 1.2-1.3. Scr. on presentation was 6.48. Complements noted to be WNL.  Patient was initiated on CVVHDF on 2/5/18 and transitioned to IHD. Patient had PUF treatment yesterday as well as HD session late last night. Patient remains anuric.  Discussed at length with sister, daughter Jennifer, Dr. Anderson that the patient is going to be dialysis dependent and therefore it is prudent to pursue a tunneled hemodialysis catheter.  Will plan for HD today with UF and HIT protocol.

## 2018-02-14 NOTE — CHART NOTE - NSCHARTNOTEFT_GEN_A_CORE
====================  CCU MIDNIGHT ROUNDS  ====================    DEUCE BALL  [id number]    ====================  SUMMARY: HPI:  This is a 77 year old man with past medical history of AAA 5.5cm s/p EVAAR repair with stents on 1/29 with incidental finding of Right Renal Neoplasm, previous history of Left Renal Neoplasm with Left Nephrectomy in 2008, Bladder Cancer, Known LBBB, HTN, HLD transferred from [facility] after initially presenting with SOB which began last night. Patient states that he was discharged from the hospital following AAA repair and recovering well.  He was walking with walker at home with no complaints.  After going to bed, he was awoken from his sleep complaining of SOB with no relief.  He went to [facility] wtih his SOB getting progressively worse with 1 episode of vomitus before going to hospital.  Patient denies fever, chills, recent sick contact, Chest pain, Abdominal pain, diarrhea.  At [place], paient with elevate d-dimers and transferred for possible PE.  At [facility], patient with  RICKI in V2-V4 with elevated Trops, and ADHF requiring Bipap. (04 Feb 2018 17:51)    ====================  NEW EVENTS: patient K found to be slighly elevated. no EKG changes. Nephrology attending wished to dialyze overnight. patient with some minor bleeding from SAUD ashraf post dialysis.   ====================    ====================  VITALS (Last 12 hrs):  ====================    T(C): 36.6 (02-14-18 @ 00:00), Max: 36.6 (02-14-18 @ 00:00)  HR: 84 (02-14-18 @ 01:00) (72 - 84)  BP: 99/58 (02-14-18 @ 00:00) (99/58 - 108/64)  BP(mean): 77 (02-14-18 @ 00:00) (73 - 81)  ABP: 108/46 (02-14-18 @ 01:00) (108/46 - 138/68)  ABP(mean): 66 (02-14-18 @ 01:00) (66 - 98)  RR: 13 (02-14-18 @ 01:00) (13 - 30)  SpO2: 98% (02-14-18 @ 01:00) (98% - 100%)    TELEMETRY: no acute events overnight.       I&O's Summary    12 Feb 2018 07:01  -  13 Feb 2018 07:00  --------------------------------------------------------  IN: 652.8 mL / OUT: 0 mL / NET: 652.8 mL    13 Feb 2018 07:01  -  14 Feb 2018 02:02  --------------------------------------------------------  IN: 538.8 mL / OUT: 1500 mL / NET: -961.2 mL      ====================  PLAN:  - continue dialysis as recommended by nephrology. monitor SAUD ashraf for further bleeding.   - labs in am, transfuse if drop in RBCs. '  - vanc level in AM post dialysis  - poor IV access. LEFT Cordis in place, assess whether LEFT Cordis can be replaced with Triple Lumen.   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

## 2018-02-14 NOTE — CONSULT NOTE ADULT - SUBJECTIVE AND OBJECTIVE BOX
HPI:  This is a 77 year old man with past medical history of AAA 5.5cm s/p EVAAR repair with stents on  with incidental finding of Right Renal Neoplasm, previous history of Left Renal Neoplasm with Left Nephrectomy in , Bladder Cancer, Known LBBB, HTN, HLD transferred from [facility] after initially presenting with SOB. Patient states that he was discharged from the hospital following AAA repair and recovering well.  He was walking with walker at home with no complaints.  After going to bed, he was awoken from his sleep complaining of SOB with no relief.  He went to [facility] wtih his SOB getting progressively worse with 1 episode of vomitus before going to hospital.  Patient denies fever, chills, recent sick contact, Chest pain, Abdominal pain, diarrhea.  At [place], paient with elevate d-dimers and transferred for possible PE.  At [facility], patient with  RICKI in V2-V4 with elevated Trops, and ADHF requiring Bipap. His course during this hospitalization was complicated with hypoxic respiratory failure secondary to pulmonary edema, ADHF, multifocal PNA, DONNIE requiring CVVHDF. He was started on heparin drip due to elevated troponins and ST-changes. 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 Found to have severe renal artery stenosis and Triple vessel disease on cath, PCI planned after renal artery intervention. He developed Afib with RVR: new onset , however, renal artery could not be engaged. Patient has been on treatment for PNA. On vanco/ aztreonam. Has an allergy to PCN, but does not remember the reaction. States it was a long time ago. Also, does not remember reactions to cipro/ levaquin.    PAST MEDICAL & SURGICAL HISTORY:  Renal mass, right: current - following with Dr Santiago  BPH (benign prostatic hyperplasia)  Abdominal aortic aneurysm (AAA): 5.5 cm  Heel spur, left  Vertigo  Left bundle branch block  Hx antineoplastic chemotherapy (BCG )  Lt Renal Neoplasm: left - s/p left nephrectomy  Hyperlipemia (ICD9 272.4): dx   Bladder Cancer (ICD9 188.9): TCC, dx   HTN: dx   H/O abdominal aortic aneurysm repair  History of cystoscopy: 2015  History of Lt  Nephrectomy: 6/10 - left  S/P Tonsillectomy  S/P Cystoscopy: bladder polyps removal multiple times (since ), 6/10 , 10/2011 &amp; 10/17/2011, , 2012, last 13, 2013, 2014  urethral Stent 2008: stent placement and removal    Allergies    Cipro (Other)  heparin (Other (Severe))  Levaquin (Other)  penicillin (Hives)    Intolerances    ANTIMICROBIALS:  aztreonam  IVPB    aztreonam  IVPB 500 every 12 hours      OTHER MEDS:  argatroban Infusion 2 MICROgram(s)/kG/Min IV Continuous <Continuous>  aspirin enteric coated 81 milliGRAM(s) Oral daily  atorvastatin 80 milliGRAM(s) Oral at bedtime  docusate sodium 100 milliGRAM(s) Oral daily  hydrALAZINE 100 milliGRAM(s) Oral every 8 hours  isosorbide   dinitrate Tablet (ISORDIL) 10 milliGRAM(s) Oral three times a day  metoprolol succinate ER 50 milliGRAM(s) Oral daily  polyethylene glycol 3350 17 Gram(s) Oral daily PRN  senna 1 Tablet(s) Oral at bedtime  sevelamer hydrochloride 1600 milliGRAM(s) Oral three times a day with meals      SOCIAL HISTORY:    FAMILY HISTORY:  Family history of MI (myocardial infarction) (Father, Mother): father  76y/o MI, mother  94y/o alzheimers      Drug Dosing Weight  Height (cm): 175.26 (2018 19:00)  Weight (kg): 68 (2018 16:05)  BMI (kg/m2): 22.1 (2018 19:00)  BSA (m2): 1.83 (2018 19:00)    PE:    Vital Signs Last 24 Hrs  T(C): 36.7 (2018 12:00), Max: 36.7 (2018 06:00)  T(F): 98.1 (2018 12:00), Max: 98.1 (2018 12:00)  HR: 86 (2018 13:00) (72 - 92)  BP: 99/58 (2018 00:00) (99/58 - 108/64)  BP(mean): 77 (2018 00:00) (73 - 81)  RR: 28 (2018 13:00) (13 - 30)  SpO2: 99% (2018 13:00) (95% - 100%)    Gen: AOx3, NAD, non-toxic, pleasant  CV: S1+S2 normal, no murmurs  Resp: coarse breath sounds bilaterally  Abd: Soft, nontender, +BS  Ext: No LE edema, no wounds  : No [place]  IV/Skin: No thrombophlebitis, bilateral groin incisions with staples - no erythema, C/D/I, HD catheter right neck no erythema  Msk: No low back pain, no arthralgias, no joint swelling  Neuro: No sensory deficits, no motor deficits    LABS:                          7.6    11.6  )-----------( 72       ( 2018 06:16 )             23.1           131<L>  |  90<L>  |  75<H>  ----------------------------<  98  4.7   |  22  |  7.11<H>    Ca    8.1<L>      2018 06:16  Phos  7.1       Mg     2.4         TPro  6.4  /  Alb  2.7<L>  /  TBili  0.3  /  DBili  x   /  AST  31  /  ALT  23  /  AlkPhos  60        MICROBIOLOGY:  Vancomycin Level, Trough: 16.7 ug/mL (18 @ 19:12)  v  .Blood Blood-Peripheral  02-10-18   No growth to date.  --  --      .Blood Blood  18   No growth at 5 days.  --  --      .Urine Clean Catch (Midstream)  18   No growth  --  --    RADIOLOGY:    < from: Xray Chest 1 View AP/PA (18 @ 08:32) >  Impression:    The heart is slightlyenlarged. Improving pulmonary edema. Superimposed   Pneumonia cannot be ruled out as well. A central line seen on the right   and tip in superior vena cava. No pneumothorax.    < end of copied text >    < from: CT Angio Abdomen and Pelvis w/ IV Cont (02.10.18 @ 15:21) >    IMPRESSION:     Infrarenal AAA status post NIALL. The stent extends above the right renal   artery, from the level of the SMA. Although slightly difficult to   visualize due to streak artifact, there is likely moderate severe right   renal artery stenosis at its origin. Moderate SMA stenosis and severe   celiac stenosis also noted, as above.    Small bilateral pleural effusions withleft lower lobe and possibly right   lower lobe pneumonia.    Redemonstration of 1.8 cm enhancing right lower pole solid renal mass.       < end of copied text >

## 2018-02-14 NOTE — PROGRESS NOTE ADULT - SUBJECTIVE AND OBJECTIVE BOX
Patient is a 77y old  Male who presents with a chief complaint of Shortness of Breath (04 Feb 2018 17:51)    Overnight events/Subjective:  Went down for R WOOD stenting, but they were unable to reopen it. Had HD yesterday, SAUD ashraf started oozing. 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  	  HPI:  This is a 77 year old man with past medical history of AAA 5.5cm s/p EVAAR repair with stents on 1/29 with incidental finding of Right Renal Neoplasm, previous history of Left Renal Neoplasm with Left Nephrectomy in 2008, Bladder Cancer, Known LBBB, HTN, HLD transferred from [facility] after initially presenting with SOB which began last night. Patient states that he was discharged from the hospital following AAA repair and recovering well.  He was walking with walker at home with no complaints.  After going to bed, he was awoken from his sleep complaining of SOB with no relief.  He went to [facility] wtih his SOB getting progressively worse with 1 episode of vomitus before going to hospital.  Patient denies fever, chills, recent sick contact, Chest pain, Abdominal pain, diarrhea.  At [place], paient with elevate d-dimers and transferred for possible PE.  At [facility], patient with  RICKI in V2-V4 with elevated Trops, and ADHF requiring Bipap. (04 Feb 2018 17:51)      MEDICATIONS  (STANDING):  argatroban Infusion 2 MICROgram(s)/kG/Min (8.16 mL/Hr) IV Continuous <Continuous>  aspirin enteric coated 81 milliGRAM(s) Oral daily  atorvastatin 80 milliGRAM(s) Oral at bedtime  aztreonam  IVPB      aztreonam  IVPB 500 milliGRAM(s) IV Intermittent every 12 hours  docusate sodium 100 milliGRAM(s) Oral daily  hydrALAZINE 100 milliGRAM(s) Oral every 8 hours  isosorbide   dinitrate Tablet (ISORDIL) 10 milliGRAM(s) Oral three times a day  metoprolol succinate ER 50 milliGRAM(s) Oral daily  senna 1 Tablet(s) Oral at bedtime  sevelamer hydrochloride 1600 milliGRAM(s) Oral three times a day with meals    MEDICATIONS  (PRN):  polyethylene glycol 3350 17 Gram(s) Oral daily PRN Constipation      REVIEW OF SYSTEMS:  Otherwise, 10 point ROS done and otherwise negative.    PHYSICAL EXAM:  Vital Signs Last 24 Hrs  T(C): 36.7 (14 Feb 2018 06:00), Max: 36.7 (13 Feb 2018 08:00)  T(F): 98 (14 Feb 2018 06:00), Max: 98.1 (13 Feb 2018 08:00)  HR: 88 (14 Feb 2018 07:30) (72 - 92)  BP: 99/58 (14 Feb 2018 00:00) (99/58 - 108/64)  BP(mean): 77 (14 Feb 2018 00:00) (73 - 81)  RR: 15 (14 Feb 2018 07:30) (13 - 30)  SpO2: 99% (14 Feb 2018 07:30) (95% - 100%)  I&O's Summary    13 Feb 2018 07:01  -  14 Feb 2018 07:00  --------------------------------------------------------  IN: 920.1 mL / OUT: 1500 mL / NET: -579.9 mL        Appearance: Normal	  HEENT:   Normal oral mucosa, PERRL, EOMI	  Lymphatic: No lymphadenopathy  Cardiovascular: Normal S1 S2, No JVD, No murmurs, No edema  Respiratory: Lungs clear to auscultation	  Psychiatry: A & O x 3, Mood & affect appropriate  Gastrointestinal:  Soft, Non-tender, + BS	  Skin: No rashes, No ecchymoses, No cyanosis	  Neurologic: Non-focal  Extremities: Normal range of motion, No clubbing, cyanosis or edema  Vascular: Peripheral pulses palpable 2+ bilaterally    LABS:	 	                        7.6    11.6  )-----------( 72       ( 14 Feb 2018 06:16 )             23.1     Auto Eosinophil # 0.3   / Auto Eosinophil % 2.2   / Auto Neutrophil # 10.1  / Auto Neutrophil % 87.0  / BANDS % x                            8.0    11.9  )-----------( 78       ( 13 Feb 2018 19:12 )             23.9     Auto Eosinophil # x     / Auto Eosinophil % x     / Auto Neutrophil # x     / Auto Neutrophil % x     / BANDS % x                            8.4    13.0  )-----------( 111      ( 13 Feb 2018 04:28 )             24.2     Auto Eosinophil # x     / Auto Eosinophil % x     / Auto Neutrophil # x     / Auto Neutrophil % x     / BANDS % x        INR: 2.68 ratio (02-13 @ 04:28)  INR: 2.63 ratio (02-12 @ 02:21)  INR: 2.99 ratio (02-11 @ 05:54)    02-14    131<L>  |  90<L>  |  75<H>  ----------------------------<  98  4.7   |  22  |  7.11<H>  02-13    132<L>  |  89<L>  |  96<H>  ----------------------------<  97  5.4<H>   |  18<L>  |  9.22<H>  02-13    131<L>  |  90<L>  |  90<H>  ----------------------------<  103<H>  5.1   |  19<L>  |  7.92<H>    Ca    8.1<L>      14 Feb 2018 06:16  Mg     2.4     02-14  Phos  7.1     02-14  TPro  6.4  /  Alb  2.7<L>  /  TBili  0.3  /  DBili  x   /  AST  31  /  ALT  23  /  AlkPhos  60  02-14  TPro  6.6  /  Alb  2.8<L>  /  TBili  0.3  /  DBili  x   /  AST  35  /  ALT  26  /  AlkPhos  61  02-13  TPro  6.2  /  Alb  2.7<L>  /  TBili  0.3  /  DBili  x   /  AST  41<H>  /  ALT  27  /  AlkPhos  65  02-13    Lactate, Blood: 0.8 mmol/L (02-12 @ 02:21)      proBNP:   Lipid Profile: 02-04 Chol 145 LDL 87 HDL 26<L> Trig 162<H>  HgA1c: 5.4 % (02-04 @ 21:37)    TSH:     CARDIAC MARKERS:   12 Feb 2018 02:21 Troponin 0.62 ng/mL / Creatine Kinase 61 U/L /  CKMB 2.7 ng/mL / CPK Mass Assay % x       05 Feb 2018 05:05 Troponin 0.73 ng/mL / Creatine Kinase 156 U/L /  CKMB 14.0 ng/mL / CPK Mass Assay % 9.0 %   05 Feb 2018 01:17 Troponin 0.80 ng/mL / Creatine Kinase 158 U/L /  CKMB 15.2 ng/mL / CPK Mass Assay % 9.6 %        TELEMETRY: 	    ECG:  	  RADIOLOGY:  OTHER: 	    PREVIOUS DIAGNOSTIC TESTING:    [ ] Echocardiogram:  [ ]  Catheterization:  [ ] Stress Test: Patient is a 77y old  Male who presents with a chief complaint of Shortness of Breath (04 Feb 2018 17:51)    Overnight events/Subjective:  Went down for R WOOD stenting, but they were unable to reopen it. Had urgent HD yesterday. SAUD ashraf started oozing, yesterday not bleeding. 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 Pt feels very down and overwhelmed by what's going on. Pt denies CP, SOB, abd pain. Family would like a family meeting.  	  HPI:  This is a 77 year old man with past medical history of AAA 5.5cm s/p EVAAR repair with stents on 1/29 with incidental finding of Right Renal Neoplasm, previous history of Left Renal Neoplasm with Left Nephrectomy in 2008, Bladder Cancer, Known LBBB, HTN, HLD transferred from [facility] after initially presenting with SOB which began last night. Patient states that he was discharged from the hospital following AAA repair and recovering well.  He was walking with walker at home with no complaints.  After going to bed, he was awoken from his sleep complaining of SOB with no relief.  He went to [facility] wtih his SOB getting progressively worse with 1 episode of vomitus before going to hospital.  Patient denies fever, chills, recent sick contact, Chest pain, Abdominal pain, diarrhea.  At [place], paient with elevate d-dimers and transferred for possible PE.  At [facility], patient with  RICKI in V2-V4 with elevated Trops, and ADHF requiring Bipap. (04 Feb 2018 17:51)      MEDICATIONS  (STANDING):  argatroban Infusion 2 MICROgram(s)/kG/Min (8.16 mL/Hr) IV Continuous <Continuous>  aspirin enteric coated 81 milliGRAM(s) Oral daily  atorvastatin 80 milliGRAM(s) Oral at bedtime  aztreonam  IVPB      aztreonam  IVPB 500 milliGRAM(s) IV Intermittent every 12 hours  docusate sodium 100 milliGRAM(s) Oral daily  hydrALAZINE 100 milliGRAM(s) Oral every 8 hours  isosorbide   dinitrate Tablet (ISORDIL) 10 milliGRAM(s) Oral three times a day  metoprolol succinate ER 50 milliGRAM(s) Oral daily  senna 1 Tablet(s) Oral at bedtime  sevelamer hydrochloride 1600 milliGRAM(s) Oral three times a day with meals    MEDICATIONS  (PRN):  polyethylene glycol 3350 17 Gram(s) Oral daily PRN Constipation      REVIEW OF SYSTEMS:  Otherwise, 10 point ROS done and otherwise negative.    PHYSICAL EXAM:  Vital Signs Last 24 Hrs  T(C): 36.7 (14 Feb 2018 06:00), Max: 36.7 (13 Feb 2018 08:00)  T(F): 98 (14 Feb 2018 06:00), Max: 98.1 (13 Feb 2018 08:00)  HR: 88 (14 Feb 2018 07:30) (72 - 92)  BP: 99/58 (14 Feb 2018 00:00) (99/58 - 108/64)  BP(mean): 77 (14 Feb 2018 00:00) (73 - 81)  RR: 15 (14 Feb 2018 07:30) (13 - 30)  SpO2: 99% (14 Feb 2018 07:30) (95% - 100%)  I&O's Summary    13 Feb 2018 07:01  -  14 Feb 2018 07:00  --------------------------------------------------------  IN: 920.1 mL / OUT: 1500 mL / NET: -579.9 mL        Appearance: Normal	  HEENT:   Normal oral mucosa, PERRL, EOMI	  Lymphatic: No lymphadenopathy  Cardiovascular: Normal S1 S2, No JVD, No murmurs, No edema  Respiratory: Coarse breath sounds  Psychiatry: A & O x 3, affect appropriate, mood dysthymic  Gastrointestinal:  Soft, Non-tender, + BS	  Skin: No rashes, No ecchymoses, No cyanosis	  Neurologic: Non-focal  Extremities: Normal range of motion, No clubbing, cyanosis or edema  Vascular: Peripheral pulses palpable 2+ bilaterally    LABS:	 	                        7.6    11.6  )-----------( 72       ( 14 Feb 2018 06:16 )             23.1     Auto Eosinophil # 0.3   / Auto Eosinophil % 2.2   / Auto Neutrophil # 10.1  / Auto Neutrophil % 87.0  / BANDS % x                            8.0    11.9  )-----------( 78       ( 13 Feb 2018 19:12 )             23.9     Auto Eosinophil # x     / Auto Eosinophil % x     / Auto Neutrophil # x     / Auto Neutrophil % x     / BANDS % x                            8.4    13.0  )-----------( 111      ( 13 Feb 2018 04:28 )             24.2     Auto Eosinophil # x     / Auto Eosinophil % x     / Auto Neutrophil # x     / Auto Neutrophil % x     / BANDS % x        INR: 2.68 ratio (02-13 @ 04:28)  INR: 2.63 ratio (02-12 @ 02:21)  INR: 2.99 ratio (02-11 @ 05:54)    02-14    131<L>  |  90<L>  |  75<H>  ----------------------------<  98  4.7   |  22  |  7.11<H>  02-13    132<L>  |  89<L>  |  96<H>  ----------------------------<  97  5.4<H>   |  18<L>  |  9.22<H>  02-13    131<L>  |  90<L>  |  90<H>  ----------------------------<  103<H>  5.1   |  19<L>  |  7.92<H>    Ca    8.1<L>      14 Feb 2018 06:16  Mg     2.4     02-14  Phos  7.1     02-14  TPro  6.4  /  Alb  2.7<L>  /  TBili  0.3  /  DBili  x   /  AST  31  /  ALT  23  /  AlkPhos  60  02-14  TPro  6.6  /  Alb  2.8<L>  /  TBili  0.3  /  DBili  x   /  AST  35  /  ALT  26  /  AlkPhos  61  02-13  TPro  6.2  /  Alb  2.7<L>  /  TBili  0.3  /  DBili  x   /  AST  41<H>  /  ALT  27  /  AlkPhos  65  02-13    Lactate, Blood: 0.8 mmol/L (02-12 @ 02:21)      proBNP:   Lipid Profile: 02-04 Chol 145 LDL 87 HDL 26<L> Trig 162<H>  HgA1c: 5.4 % (02-04 @ 21:37)    TSH:     CARDIAC MARKERS:   12 Feb 2018 02:21 Troponin 0.62 ng/mL / Creatine Kinase 61 U/L /  CKMB 2.7 ng/mL / CPK Mass Assay % x       05 Feb 2018 05:05 Troponin 0.73 ng/mL / Creatine Kinase 156 U/L /  CKMB 14.0 ng/mL / CPK Mass Assay % 9.0 %   05 Feb 2018 01:17 Troponin 0.80 ng/mL / Creatine Kinase 158 U/L /  CKMB 15.2 ng/mL / CPK Mass Assay % 9.6 %        TELEMETRY: 	    ECG:  	  RADIOLOGY:  OTHER: 	    PREVIOUS DIAGNOSTIC TESTING:    [ ] Echocardiogram:  [ ]  Catheterization:  [ ] Stress Test:

## 2018-02-14 NOTE — PROGRESS NOTE ADULT - PROBLEM SELECTOR PLAN 1
HIT positive  PF4 1.5499  PRADEEP positive  continue with argratroban  bridge to oral anticoagulant when platelets >150  avoid all heparin products if possible

## 2018-02-14 NOTE — PROGRESS NOTE ADULT - SUBJECTIVE AND OBJECTIVE BOX
[facility] Division of Kidney Diseases & Hypertension  FOLLOW UP NOTE  --------------------------------------------------------------------------------    HPI: 77 year old man with past medical history of AAA 5.5cm s/p EVAAR repair on 1/29 with incidental finding of Right Neoplasm, previous history of Left Neoplasm with Left Nephrectomy in 2009 transferred from [facility] for worsening SOB. Patient was found to have DONNIE and fluid overload and was initiated on CVVHDF on 2/4/17 and was stopped on 2/6/18.  Had rapid afib with HD.  Ultrasound suggestive of high grade right renal artery narrowing.  Patient had CT angiogram over weekend which showed moderate to severe right renal artery stenosis.  Second PCI attempt for revascularization unsuccessful yesterday.  Currently patient is still on high flow nasal cannula; denies complains of CP, abdominal pain, nausea, has a headache    PAST HISTORY  --------------------------------------------------------------------------------  No significant changes to PMH, PSH, FHx, SHx, unless otherwise noted    ALLERGIES & MEDICATIONS  --------------------------------------------------------------------------------  Allergies    Cipro (Other)  heparin (Other (Severe))  Levaquin (Other)  penicillin (Hives)    Intolerances      Standing Inpatient Medications  argatroban Infusion 2 MICROgram(s)/kG/Min IV Continuous <Continuous>  aspirin enteric coated 81 milliGRAM(s) Oral daily  atorvastatin 80 milliGRAM(s) Oral at bedtime  aztreonam  IVPB      aztreonam  IVPB 500 milliGRAM(s) IV Intermittent every 12 hours  docusate sodium 100 milliGRAM(s) Oral daily  hydrALAZINE 100 milliGRAM(s) Oral every 8 hours  isosorbide   dinitrate Tablet (ISORDIL) 10 milliGRAM(s) Oral three times a day  metoprolol succinate ER 50 milliGRAM(s) Oral daily  senna 1 Tablet(s) Oral at bedtime  sevelamer hydrochloride 1600 milliGRAM(s) Oral three times a day with meals    PRN Inpatient Medications  polyethylene glycol 3350 17 Gram(s) Oral daily PRN      REVIEW OF SYSTEMS  --------------------------------------------------------------------------------  as noted above    VITALS/PHYSICAL EXAM  --------------------------------------------------------------------------------  T(C): 36.7 (02-14-18 @ 06:00), Max: 36.7 (02-13-18 @ 11:50)  HR: 86 (02-14-18 @ 09:00) (72 - 92)  BP: 99/58 (02-14-18 @ 00:00) (99/58 - 108/64)  RR: 17 (02-14-18 @ 09:00) (13 - 30)  SpO2: 98% (02-14-18 @ 09:00) (95% - 100%)  Wt(kg): --        02-13-18 @ 07:01  -  02-14-18 @ 07:00  --------------------------------------------------------  IN: 920.1 mL / OUT: 1500 mL / NET: -579.9 mL    02-14-18 @ 07:01  -  02-14-18 @ 09:53  --------------------------------------------------------  IN: 16.3 mL / OUT: 0 mL / NET: 16.3 mL      Physical Exam:             Gen: NAD on hi flow  	HEENT: anicteric  	Pulm: diminished breath sounds  	CV: RRR s1 and s2  	Abd: soft, nontender, nondistended  	LE: edema improved  	Skin: Warm and dry   	Vascular access: right non tunneled HD catheter present dry crusted blood noted.    LABS/STUDIES  --------------------------------------------------------------------------------              7.6    11.6  >-----------<  72       [02-14-18 @ 06:16]              23.1     131  |  90  |  75  ----------------------------<  98      [02-14-18 @ 06:16]  4.7   |  22  |  7.11        Ca     8.1     [02-14-18 @ 06:16]      Mg     2.4     [02-14-18 @ 06:16]      Phos  7.1     [02-14-18 @ 06:16]    TPro  6.4  /  Alb  2.7  /  TBili  0.3  /  DBili  x   /  AST  31  /  ALT  23  /  AlkPhos  60  [02-14-18 @ 06:16]    PT/INR: PT 19.6 , INR 1.78       [02-14-18 @ 08:03]  PTT: 57.7       [02-14-18 @ 08:03]      Creatinine Trend:  SCr 7.11 [02-14 @ 06:16]  SCr 9.22 [02-13 @ 19:12]  SCr 7.92 [02-13 @ 04:28]  SCr 4.95 [02-12 @ 02:21]  SCr 7.27 [02-11 @ 05:54]          HBsAb <3.0      [02-07-18 @ 23:02]  HBsAg Nonreact      [02-07-18 @ 23:02]  HCV 0.11, Nonreact      [02-07-18 @ 23:02]

## 2018-02-14 NOTE — PROGRESS NOTE ADULT - PROBLEM SELECTOR PLAN 2
Serum potassium is improved but was elevated to 5.4 yesterday.   WIll arrange for HD again today.  Monitor serum K level.

## 2018-02-14 NOTE — PROGRESS NOTE ADULT - ASSESSMENT
77 year old man with past medical history of AAA 5.5cm s/p EVAAR repair on 1/29 with incidental finding of Right Neoplasm, previous history of Left Neoplasm with Left Nephrectomy in 2009, Bladder Cancer, Known LBBB, HTN, HLD presents with hypoxic respiratory failure 2/2 to pulmonary edema  2/2 ADHF + multifocal PNA in the setting of DONNIE on CKD with evidence of severe renal artery stenosis and TVD on cath    #Neuro: AOx2-3  Confused at times, likely delirium in the setting of hospital stay. Able to easily re-orient    #CV:  1) Pulmonary edema 2/2 to ADHF + volume overload in the setting of acute renal failure. Now resolved s/p dialysis  2) AAA 5.5cm s/p EVAR repair with stents 1/29 with Dr. Wyatt Young  [facility] with triple vessel disease, PCI planned after renal artery intervention. Continue with aspirin, bb, statin  TTE with evidence of severe LV dysfunction (EF 25-30%) with moderate AS, Given [facility] findings, likely ischemic cardiomyopathy.   -c/w hydralazine 100 q8, isordil 10 TID, toprol XL 50mg daily for preload/afterload reduction. Continue to monitor oxy sat/BP  -c/w ASA  -Afib with RVR: new onset, in the setting of sepsis, HF. No repeat episodes Continue with BB, uptitrate as tolerated. 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 Given recent hospitalization, will treat for HCAP for total of 10 days. Blood cx NGTD  -Wean off oxygen as tolerated  -Vancomycin dosed by level and aztreonam (2/5 - ), EOT 2/14    #Renal:   1) DONNIE on CKD in the setting of recent surgery, iv contrast for imaging. Renal US significant for high grade stenosis at the origin of the renal artery. Plan for the Cath of the renal artery with likely stenting. Renal artery could not be engaged, unclear relation of stent structure to artery. Baseline SCr 1.2 - 1.3  2) L RCC s/p nephrectomy, R renal mass likely RCC recurrence  CTA aorta shows stent extends past R renal artery to SMA  Monitor Scr, electrolytes, UOP. Removed 4L UF but still on HFNC  -Percutaneous intervention for WOOD with interventional cardiology Dr. Heriberto Garg likely tomorrow, NPO at [facility]  -UF before procedure so can lie flat, HD after    #Heme/Onc:   1) Heparin-induced thrombocytopenia  2) R renal mass likely RCC  Spoke with pt's outpatient urologist Dr. Santiago. He is aware of the lesion on pt's right kidney but says given its size, does not require intervention at this time and states we should prioritize treating potential ischemic disease. He will continue to monitor the mass as outpatient.   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 WBC downtrending.  -Aztreonam (2/5 - ), EOT 2/14  -Continue to trend, dose vanc by level (2/5 - ), EOT 2/14. Goal serum level 15-20    #GI: DASH diet, no issues  -trend LFTs on Argatroban    DVT ppx: argatroban 77 year old man with past medical history of AAA 5.5cm s/p EVAAR repair on 1/29 with incidental finding of Right Neoplasm, previous history of Left Neoplasm with Left Nephrectomy in 2009, Bladder Cancer, Known LBBB, HTN, HLD presents with hypoxic respiratory failure 2/2 to pulmonary edema  2/2 ADHF + multifocal PNA in the setting of DONNIE on CKD with evidence of severe renal artery stenosis and TVD on cath    #Neuro: AOx2-3  Confused at times, likely delirium in the setting of hospital stay. Able to easily re-orient    #CV:  1) Pulmonary edema 2/2 to ADHF + volume overload in the setting of acute renal failure. Now resolved s/p dialysis  2) AAA 5.5cm s/p EVAR repair with stents 1/29 with Dr. Wyatt Young  [facility] with triple vessel disease, PCI planned after renal artery intervention. Continue with aspirin, bb, statin  TTE with evidence of severe LV dysfunction (EF 25-30%) with moderate AS, Given [facility] findings, likely ischemic cardiomyopathy.   -c/w hydralazine 100 q8, isordil 10 TID, toprol XL 50mg daily for preload/afterload reduction. Continue to monitor oxy sat/BP  -c/w ASA  -Afib with RVR: new onset, in the setting of sepsis, HF. No repeat episodes Continue with BB, uptitrate as tolerated. 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 Given recent hospitalization, will treat for HCAP for total of 10 days. Blood cx NGTD  -Wean off oxygen as tolerated  -Vancomycin dosed by level and aztreonam (2/5 - ), EOT 2/14    #Renal:   1) DONNIE on CKD in the setting of recent surgery, iv contrast for imaging. Renal US significant for high grade stenosis at the origin of the renal artery. Plan for the Cath of the renal artery with likely stenting. Renal artery could not be engaged, unclear relation of stent structure to artery. Baseline SCr 1.2 - 1.3  2) L RCC s/p nephrectomy, R renal mass likely RCC recurrence  CTA aorta shows stent extends past R renal artery to SMA  Monitor Scr, electrolytes, UOP. Removed 4L UF but still on HFNC  -Percutaneous intervention for WOOD with interventional cardiology Dr. Heriberto Garg likely tomorrow, NPO at [facility]  -UF before procedure so can lie flat, HD after    #Heme/Onc:   1) Heparin-induced thrombocytopenia  2) R renal mass likely RCC  Spoke with pt's outpatient urologist Dr. Santiago. He is aware of the lesion on pt's right kidney but says given its size, does not require intervention at this time and states we should prioritize treating potential ischemic disease. He will continue to monitor the mass as outpatient.   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 WBC downtrending.  -Aztreonam (2/5 - ), EOT 2/14  -Continue to trend, dose vanc by level (2/5 - ), EOT 2/14. Goal serum level 15-20    #GI: DASH diet, no issues  -trend LFTs on Argatroban    DVT ppx: argatroban 77 year old man with past medical history of AAA 5.5cm s/p EVAAR repair on 1/29 with incidental finding of Right Neoplasm, previous history of Left Neoplasm with Left Nephrectomy in 2009, Bladder Cancer, Known LBBB, HTN, HLD presents with hypoxic respiratory failure 2/2 to pulmonary edema  2/2 ADHF + multifocal PNA in the setting of DONNIE on CKD with evidence of severe renal artery stenosis and TVD on cath    #Neuro: AOx2-3  Confused at times, likely delirium in the setting of hospital stay. Able to easily re-orient    #CV:  1) Pulmonary edema 2/2 to ADHF + volume overload in the setting of acute renal failure. Now resolved s/p dialysis  2) AAA 5.5cm s/p EVAR repair with stents 1/29 with Dr. Wyatt Young  [facility] with triple vessel disease, PCI planned after renal artery intervention. Continue with aspirin, bb, statin  TTE with evidence of severe LV dysfunction (EF 25-30%) with moderate AS, Given [facility] findings, likely ischemic cardiomyopathy.   -c/w hydralazine 100 q8, isordil 10 TID, toprol XL 50mg daily for preload/afterload reduction. Continue to monitor oxy sat/BP  -c/w ASA  -Afib with RVR: new onset, in the setting of sepsis, HF. No repeat episodes Continue with BB, uptitrate as tolerated. 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 Given recent hospitalization, will treat for HCAP for total of 10 days. Blood cx NGTD  -Wean off oxygen as tolerated  -Vancomycin dosed by level and aztreonam (2/5 - ), EOT 2/14    #Renal:   1) DONNIE on CKD in the setting of recent surgery, iv contrast for imaging. Renal US significant for high grade stenosis at the origin of the renal artery. Plan for the Cath of the renal artery with likely stenting. Renal artery could not be engaged, unclear relation of stent structure to artery. Baseline SCr 1.2 - 1.3  2) Right renal artery stenosis  3) L RCC s/p nephrectomy, R renal mass likely RCC recurrence  CTA aorta shows stent extends past R renal artery to SMA  Monitor Scr, electrolytes, UOP. Removed 4L UF but still on HFNC  -Percutaneous intervention for WOOD with interventional cardiology Dr. Heriberto Garg likely tomorrow, NPO at [facility]  -UF before procedure so can lie flat, HD after    #Heme/Onc:   1) Heparin-induced thrombocytopenia  2) R renal mass likely RCC  Spoke with pt's outpatient urologist Dr. Santiago. He is aware of the lesion on pt's right kidney but says given its size, does not require intervention at this time and states we should prioritize treating potential ischemic disease. He will continue to monitor the mass as outpatient.   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 WBC downtrending.  -Aztreonam (2/5 - ), EOT 2/14  -Continue to trend, dose vanc by level (2/5 - ), EOT 2/14. Goal serum level 15-20    #GI: DASH diet, no issues  -trend LFTs on Argatroban    DVT ppx: argatroban 77 year old man with past medical history of AAA 5.5cm s/p EVAAR repair on 1/29 with incidental finding of Right Neoplasm, previous history of Left Neoplasm with Left Nephrectomy in 2009, Bladder Cancer, Known LBBB, HTN, HLD presents with hypoxic respiratory failure 2/2 to pulmonary edema  2/2 ADHF + multifocal PNA in the setting of DONNIE on CKD with evidence of severe renal artery stenosis and TVD on cath    #Neuro: AOx2-3  Confused at times, likely delirium in the setting of hospital stay. Able to easily re-orient    #CV:  1) Pulmonary edema 2/2 to ADHF + volume overload in the setting of acute renal failure. Now resolved s/p dialysis  2) NSTEMI  3) AAA 5.5cm s/p EVAR repair with stents 1/29 with Dr. Wyatt Young  -Continue with aspirin, bb, statin  -TTE with evidence of severe LV dysfunction (EF 25-30%) with moderate AS, Given [facility] findings, likely ischemic cardiomyopathy.   -c/w hydralazine 100 q8, isordil 10 TID, toprol XL 50mg daily for preload/afterload reduction. Continue to monitor oxy sat/BP  -c/w ASA  -Afib with RVR: new onset, in the setting of sepsis, HF. No repeat episodes Continue with BB, uptitrate as tolerated. 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 Given recent hospitalization, will treat for HCAP for total of 10 days. Blood cx NGTD  -Completed aztreonam and vanc dosed by level 10day course. Now afebrile, leukocytosis downtrending  -Wean off oxygen as tolerated    #Renal:   1) DONNIE on CKD in the setting of recent surgery, iv contrast for imaging. Renal US significant for high grade stenosis at the origin of the renal artery.  During cath, renal artery could not be engaged, unclear relation of stent structure to artery. Baseline SCr 1.2 - 1.3  2) Right renal artery stenosis  3) L RCC s/p nephrectomy, R renal mass likely RCC recurrence  CTA aorta shows stent extends past R renal artery to SMA  Monitor Scr, electrolytes, UOP. Removed 4L UF but still on HFNC  -Unable to stent R renal artery for WOOD 2/13  -HD today  -Holding argatroban today for permacath placement via IR tmrw    #Heme/Onc:   1) Heparin-induced thrombocytopenia  2) R renal mass likely RCC  Spoke with pt's outpatient urologist Dr. Santiago. He is aware of the lesion on pt's right kidney but says given its size, does not require intervention at this time and states we should prioritize treating potential ischemic disease. He will continue to monitor the mass as outpatient.   HIT score ~4-5, HIT panel positive, PRADEEP positive  -Hematology following, pt will need outpatient hematology follow up at [facility] [phone] and at least 4 weeks of anticoagulation  -c/w argatroban gtt, when PLT >150k, can bridge to coumadin over 5 days, INR 2-3  -avoid all heparin products  -trend LFTs every few days on Argatroban      #ID:  1) Hospital-acquired pneumonia  Pt initially presented with severe sepsis likely 2/2 to PNA with signs of end organ dysfunction (donnie, elevated trops) now improving s/p abx. WBC downtrending.  -Completed aztreonam and vanc dosed by level 10day course. Now afebrile, leukocytosis downtrending    #GI: DASH diet, no issues  -trend LFTs on Argatroban    DVT ppx: argatroban

## 2018-02-14 NOTE — PROGRESS NOTE ADULT - SUBJECTIVE AND OBJECTIVE BOX
INTERVAL HPI/OVERNIGHT EVENTS:  Had PCI for renal artery stenosis yesterday.     VITAL SIGNS:  T(F): 98.1 (02-14-18 @ 12:00)  HR: 94 (02-14-18 @ 17:15)  BP: 99/58 (02-14-18 @ 00:00)  RR: 24 (02-14-18 @ 17:15)  SpO2: 98% (02-14-18 @ 17:15)  Wt(kg): --    PHYSICAL EXAM:    Constitutional: NAD  Eyes: EOMI, sclera non-icteric  Neck: supple, no masses, no JVD  Respiratory: CTA b/l, good air entry b/l  Cardiovascular: RRR, no M/R/G  Gastrointestinal: soft, NTND, no masses palpable, + BS, no hepatosplenomegaly  Extremities: no c/c/e  Neurological: AAOx3      MEDICATIONS  (STANDING):  argatroban Infusion 2 MICROgram(s)/kG/Min (8.16 mL/Hr) IV Continuous <Continuous>  aspirin enteric coated 81 milliGRAM(s) Oral daily  atorvastatin 80 milliGRAM(s) Oral at bedtime  docusate sodium 100 milliGRAM(s) Oral daily  hydrALAZINE 100 milliGRAM(s) Oral every 8 hours  isosorbide   dinitrate Tablet (ISORDIL) 10 milliGRAM(s) Oral three times a day  metoprolol succinate ER 50 milliGRAM(s) Oral daily  senna 1 Tablet(s) Oral at bedtime  sevelamer hydrochloride 1600 milliGRAM(s) Oral three times a day with meals  vasopressin Infusion 0.04 Unit(s)/Min (2.4 mL/Hr) IV Continuous <Continuous>    MEDICATIONS  (PRN):  polyethylene glycol 3350 17 Gram(s) Oral daily PRN Constipation      Allergies    Cipro (Other)  heparin (Other (Severe))  Levaquin (Other)  penicillin (Hives)    Intolerances        LABS:                        7.1    11.2  )-----------( 82       ( 14 Feb 2018 16:23 )             20.8     02-14    131<L>  |  90<L>  |  75<H>  ----------------------------<  98  4.7   |  22  |  7.11<H>    Ca    8.1<L>      14 Feb 2018 06:16  Phos  7.1     02-14  Mg     2.4     02-14    TPro  6.4  /  Alb  2.7<L>  /  TBili  0.3  /  DBili  x   /  AST  31  /  ALT  23  /  AlkPhos  60  02-14    PT/INR - ( 14 Feb 2018 15:20 )   PT: 15.9 sec;   INR: 1.45 ratio         PTT - ( 14 Feb 2018 08:03 )  PTT:57.7 sec      RADIOLOGY & ADDITIONAL TESTS:  Studies reviewed.

## 2018-02-15 LAB
ALBUMIN SERPL ELPH-MCNC: 2.8 G/DL — LOW (ref 3.3–5)
ALP SERPL-CCNC: 62 U/L — SIGNIFICANT CHANGE UP (ref 40–120)
ALT FLD-CCNC: 21 U/L RC — SIGNIFICANT CHANGE UP (ref 10–45)
ANION GAP SERPL CALC-SCNC: 18 MMOL/L — HIGH (ref 5–17)
APTT BLD: 35.6 SEC — SIGNIFICANT CHANGE UP (ref 27.5–37.4)
APTT BLD: 55.6 SEC — HIGH (ref 27.5–37.4)
APTT BLD: 56.9 SEC — HIGH (ref 27.5–37.4)
AST SERPL-CCNC: 32 U/L — SIGNIFICANT CHANGE UP (ref 10–40)
BILIRUB SERPL-MCNC: 0.3 MG/DL — SIGNIFICANT CHANGE UP (ref 0.2–1.2)
BUN SERPL-MCNC: 57 MG/DL — HIGH (ref 7–23)
CALCIUM SERPL-MCNC: 8 MG/DL — LOW (ref 8.4–10.5)
CHLORIDE SERPL-SCNC: 95 MMOL/L — LOW (ref 96–108)
CO2 SERPL-SCNC: 21 MMOL/L — LOW (ref 22–31)
CREAT SERPL-MCNC: 5.76 MG/DL — HIGH (ref 0.5–1.3)
CULTURE RESULTS: SIGNIFICANT CHANGE UP
CULTURE RESULTS: SIGNIFICANT CHANGE UP
GAS PNL BLDA: SIGNIFICANT CHANGE UP
GAS PNL BLDA: SIGNIFICANT CHANGE UP
GLUCOSE SERPL-MCNC: 112 MG/DL — HIGH (ref 70–99)
HCT VFR BLD CALC: 25.1 % — LOW (ref 39–50)
HGB BLD-MCNC: 8.6 G/DL — LOW (ref 13–17)
INR BLD: 1.21 RATIO — HIGH (ref 0.88–1.16)
INR BLD: 1.66 RATIO — HIGH (ref 0.88–1.16)
INR BLD: 1.72 RATIO — HIGH (ref 0.88–1.16)
MAGNESIUM SERPL-MCNC: 2.3 MG/DL — SIGNIFICANT CHANGE UP (ref 1.6–2.6)
MCHC RBC-ENTMCNC: 32 PG — SIGNIFICANT CHANGE UP (ref 27–34)
MCHC RBC-ENTMCNC: 34.2 GM/DL — SIGNIFICANT CHANGE UP (ref 32–36)
MCV RBC AUTO: 93.5 FL — SIGNIFICANT CHANGE UP (ref 80–100)
PHOSPHATE SERPL-MCNC: 5.3 MG/DL — HIGH (ref 2.5–4.5)
PLATELET # BLD AUTO: 67 K/UL — LOW (ref 150–400)
POTASSIUM SERPL-MCNC: 4.6 MMOL/L — SIGNIFICANT CHANGE UP (ref 3.5–5.3)
POTASSIUM SERPL-SCNC: 4.6 MMOL/L — SIGNIFICANT CHANGE UP (ref 3.5–5.3)
PROT SERPL-MCNC: 6.7 G/DL — SIGNIFICANT CHANGE UP (ref 6–8.3)
PROTHROM AB SERPL-ACNC: 13.2 SEC — HIGH (ref 9.8–12.7)
PROTHROM AB SERPL-ACNC: 18.3 SEC — HIGH (ref 9.8–12.7)
PROTHROM AB SERPL-ACNC: 19 SEC — HIGH (ref 9.8–12.7)
RBC # BLD: 2.68 M/UL — LOW (ref 4.2–5.8)
RBC # FLD: 12.5 % — SIGNIFICANT CHANGE UP (ref 10.3–14.5)
SODIUM SERPL-SCNC: 134 MMOL/L — LOW (ref 135–145)
SPECIMEN SOURCE: SIGNIFICANT CHANGE UP
SPECIMEN SOURCE: SIGNIFICANT CHANGE UP
WBC # BLD: 10.5 K/UL — SIGNIFICANT CHANGE UP (ref 3.8–10.5)
WBC # FLD AUTO: 10.5 K/UL — SIGNIFICANT CHANGE UP (ref 3.8–10.5)

## 2018-02-15 PROCEDURE — 99233 SBSQ HOSP IP/OBS HIGH 50: CPT | Mod: GC

## 2018-02-15 PROCEDURE — 71045 X-RAY EXAM CHEST 1 VIEW: CPT | Mod: 26

## 2018-02-15 PROCEDURE — 99291 CRITICAL CARE FIRST HOUR: CPT

## 2018-02-15 PROCEDURE — 99232 SBSQ HOSP IP/OBS MODERATE 35: CPT

## 2018-02-15 PROCEDURE — 93010 ELECTROCARDIOGRAM REPORT: CPT

## 2018-02-15 RX ORDER — DIAZEPAM 5 MG
10 TABLET ORAL ONCE
Qty: 0 | Refills: 0 | Status: DISCONTINUED | OUTPATIENT
Start: 2018-02-15 | End: 2018-02-15

## 2018-02-15 RX ORDER — ARGATROBAN 50 MG/50ML
2 INJECTION, SOLUTION INTRAVENOUS
Qty: 250 | Refills: 0 | Status: DISCONTINUED | OUTPATIENT
Start: 2018-02-15 | End: 2018-02-16

## 2018-02-15 RX ORDER — DIAZEPAM 5 MG
5 TABLET ORAL ONCE
Qty: 0 | Refills: 0 | Status: DISCONTINUED | OUTPATIENT
Start: 2018-02-15 | End: 2018-02-15

## 2018-02-15 RX ORDER — MIDAZOLAM HYDROCHLORIDE 1 MG/ML
1 INJECTION, SOLUTION INTRAMUSCULAR; INTRAVENOUS
Qty: 100 | Refills: 0 | Status: DISCONTINUED | OUTPATIENT
Start: 2018-02-15 | End: 2018-02-15

## 2018-02-15 RX ORDER — IPRATROPIUM/ALBUTEROL SULFATE 18-103MCG
3 AEROSOL WITH ADAPTER (GRAM) INHALATION ONCE
Qty: 0 | Refills: 0 | Status: COMPLETED | OUTPATIENT
Start: 2018-02-15 | End: 2018-02-15

## 2018-02-15 RX ORDER — IPRATROPIUM/ALBUTEROL SULFATE 18-103MCG
3 AEROSOL WITH ADAPTER (GRAM) INHALATION EVERY 6 HOURS
Qty: 0 | Refills: 0 | Status: DISCONTINUED | OUTPATIENT
Start: 2018-02-15 | End: 2018-02-24

## 2018-02-15 RX ORDER — SODIUM CHLORIDE 9 MG/ML
250 INJECTION INTRAMUSCULAR; INTRAVENOUS; SUBCUTANEOUS
Qty: 0 | Refills: 0 | Status: DISCONTINUED | OUTPATIENT
Start: 2018-02-15 | End: 2018-02-15

## 2018-02-15 RX ORDER — FENTANYL CITRATE 50 UG/ML
0.1 INJECTION INTRAVENOUS
Qty: 2500 | Refills: 0 | Status: DISCONTINUED | OUTPATIENT
Start: 2018-02-15 | End: 2018-02-15

## 2018-02-15 RX ADMIN — Medication 100 MILLIGRAM(S): at 05:30

## 2018-02-15 RX ADMIN — SEVELAMER CARBONATE 1600 MILLIGRAM(S): 2400 POWDER, FOR SUSPENSION ORAL at 18:17

## 2018-02-15 RX ADMIN — ISOSORBIDE DINITRATE 10 MILLIGRAM(S): 5 TABLET ORAL at 14:16

## 2018-02-15 RX ADMIN — Medication 3 MILLILITER(S): at 04:13

## 2018-02-15 RX ADMIN — Medication 5 MILLIGRAM(S): at 01:35

## 2018-02-15 RX ADMIN — SODIUM CHLORIDE 10 MILLILITER(S): 9 INJECTION INTRAMUSCULAR; INTRAVENOUS; SUBCUTANEOUS at 00:30

## 2018-02-15 RX ADMIN — ISOSORBIDE DINITRATE 10 MILLIGRAM(S): 5 TABLET ORAL at 21:23

## 2018-02-15 RX ADMIN — Medication 5 MILLIGRAM(S): at 03:08

## 2018-02-15 RX ADMIN — ATORVASTATIN CALCIUM 80 MILLIGRAM(S): 80 TABLET, FILM COATED ORAL at 21:23

## 2018-02-15 RX ADMIN — Medication 50 MILLIGRAM(S): at 05:31

## 2018-02-15 RX ADMIN — ARGATROBAN 8.16 MICROGRAM(S)/KG/MIN: 50 INJECTION, SOLUTION INTRAVENOUS at 13:26

## 2018-02-15 RX ADMIN — ARGATROBAN 8.16 MICROGRAM(S)/KG/MIN: 50 INJECTION, SOLUTION INTRAVENOUS at 21:23

## 2018-02-15 RX ADMIN — Medication 10 MILLIGRAM(S): at 21:23

## 2018-02-15 RX ADMIN — Medication 81 MILLIGRAM(S): at 14:16

## 2018-02-15 RX ADMIN — Medication 100 MILLIGRAM(S): at 14:16

## 2018-02-15 RX ADMIN — Medication 100 MILLIGRAM(S): at 21:23

## 2018-02-15 RX ADMIN — ISOSORBIDE DINITRATE 10 MILLIGRAM(S): 5 TABLET ORAL at 05:30

## 2018-02-15 NOTE — PROGRESS NOTE ADULT - PROBLEM SELECTOR PLAN 4
In setting of renal failure: Serum phosphorus is 5.3 today. C/w renagel 1600 mg TID with meals. Monitor serum phosphorus level.

## 2018-02-15 NOTE — PROGRESS NOTE ADULT - ASSESSMENT
77 year old man with past medical history of AAA 5.5cm s/p EVAAR repair with stents on 1/29 with incidental finding of Right Renal Neoplasm, previous history of Left Renal Neoplasm with Left Nephrectomy in 2008, Bladder Cancer, Known LBBB, HTN, HLD transferred from [facility] after initially presenting with SOB.     His course during this hospitalization was complicated with hypoxic respiratory failure secondary to pulmonary edema, ADHF, multifocal PNA, DONNIE requiring CVVHDF. He was started on heparin drip due to elevated troponins and ST-changes. 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 Found to have severe renal artery stenosis and Triple vessel disease on cath, PCI planned after renal artery intervention. He developed Afib with RVR: new onset , however, renal artery could not be engaged. Patient has been on treatment for PNA and completed 10 days of vanco/ aztreonam. Leukocytosis improved. Remains afebrile. Has an allergy to PCN, but does not remember the reaction. States it was a long time ago. Also, does not remember reactions to cipro/ levaquin. All bld cxs NGTD.    Recommend:  -Multifocal PNA on day 10. Continue to monitor off antibiotics.  -From an ID standpoint, patient is hemodynamically stable, afebrile, leukocytosis improving no objection for permacath placement.    Rico Bills MD  Pager [phone]  After 5pm/weekends call [phone]

## 2018-02-15 NOTE — CHART NOTE - NSCHARTNOTEFT_GEN_A_CORE
Source: Patient [ ]    Family [x ] daughter at bedside     other [ x] Medical records, RN; Pt seen for malnutrition follow up. Per chart, 78 y/o male with PMH of left renal neoplasm with left nephrectomy in 2008, Bladder Cancer, Known LBBB, HTN, HLD, AAA s/p EVAAR repair with stents on 1/29 with incidental finding of right renal neoplasm, admitted with NSTEMI, hypoxic respiratory failure 2/2 pulmonary edema 2/2 ADHF and multifocal PNA, DONNIE on CKD3 2/2 acute right renal artery stenosis and right renal mass likely RCC, s/p HD today. Pt asleep at visit.    Diet : Mechanical Soft Renal 1200cc Nepro 2 x day Prosource 1 x day      Patient reports [ ] nausea  [ ] vomiting [ ] diarrhea [ ] constipation  [ ]chewing problems [ ] swallowing issues  [ ] other: Pt had episodes of diarrhea yesterday, stool more formed today per daughter and RN     PO intake:  < 50% [x ] 50-75% [ ]   % [ ]  other :     Source for PO intake [ ] Patient [x ] family [x ] chart [x ] staff [ ] other: Per RN, pt needs encouragements during meals. Daughter reports <50% po intakes of meals, but drinking the supplements, request different Nepro flavor        Current Weight:  - wt increase noted 2/2 fluid shift from 149 pounds (2/4), 162.2 pounds (2/12), 158.2 pounds today  % Weight Change    Pertinent Medications: MEDICATIONS  (STANDING):  ALBUTerol/ipratropium for Nebulization. 3 milliLiter(s) Nebulizer once  argatroban Infusion 2 MICROgram(s)/kG/Min (8.16 mL/Hr) IV Continuous <Continuous>  aspirin enteric coated 81 milliGRAM(s) Oral daily  atorvastatin 80 milliGRAM(s) Oral at bedtime  docusate sodium 100 milliGRAM(s) Oral daily  hydrALAZINE 100 milliGRAM(s) Oral every 8 hours  isosorbide   dinitrate Tablet (ISORDIL) 10 milliGRAM(s) Oral three times a day  metoprolol succinate ER 50 milliGRAM(s) Oral daily  senna 1 Tablet(s) Oral at bedtime  sevelamer hydrochloride 1600 milliGRAM(s) Oral three times a day with meals    MEDICATIONS  (PRN):  polyethylene glycol 3350 17 Gram(s) Oral daily PRN Constipation    Pertinent Labs:  02-15 Na134 mmol/L<L> Glu 112 mg/dL<H> K+ 4.6 mmol/L Cr  5.76 mg/dL<H> BUN 57 mg/dL<H> Phos 5.3 mg/dL<H> Alb 2.8 g/dL<L> PAB n/a         Skin: +1 generalized, no pressure injuries noted    Estimated Needs:   [x ] no change since previous assessment  [ ] recalculated:       Previous Nutrition Diagnosis:     [ ] Inadequate Energy Intake [ ]Inadequate Oral Intake [ ] Excessive Energy Intake     [ ] Underweight [ ] Increased Nutrient Needs [ ] Overweight/Obesity     [ ] Altered GI Function [ ] Unintended Weight Loss [ ] Food & Nutrition Related Knowledge Deficit [x ] Malnutrition - severe         Nutrition Diagnosis is [x ] ongoing  [ ] resolved [ ] not applicable          New Nutrition Diagnosis: [x ] not applicable        Recommend    [ ] Change Diet To:    [ x] Nutrition Supplement: Will provide Wild berry flavor Nepro per daughter's request    [ ] Nutrition Support    [ x] Other: Renal diet education reviewed with daughter at bedside       Monitoring and Evaluation:     [ x] PO intake [x ] Tolerance to diet prescription [x ] weights [ ] follow up per protocol    [ ] other:

## 2018-02-15 NOTE — PROGRESS NOTE ADULT - ASSESSMENT
77 year old man with past medical history of AAA 5.5cm s/p EVAAR repair on 1/29 with incidental finding of Right Neoplasm, previous history of Left Neoplasm with Left Nephrectomy in 2009, Bladder Cancer, Known LBBB, HTN, HLD presents with hypoxic respiratory failure 2/2 to pulmonary edema  2/2 ADHF + multifocal PNA in the setting of DONNIE on CKD with evidence of severe renal artery stenosis and TVD on cath    #Neuro: AOx2-3  Confused at times, likely delirium in the setting of hospital stay. Able to easily re-orient    #CV:  1) Pulmonary edema 2/2 to ADHF + volume overload in the setting of acute renal failure. Now resolved s/p dialysis  2) NSTEMI  3) AAA 5.5cm s/p EVAR repair with stents 1/29 with Dr. Wyatt Young  -Continue with aspirin, bb, statin  -TTE with evidence of severe LV dysfunction (EF 25-30%) with moderate AS, Given LHC findings, likely ischemic cardiomyopathy.   -c/w hydralazine 100 q8, isordil 10 TID, toprol XL 50mg daily for preload/afterload reduction. Continue to monitor oxy sat/BP  -c/w ASA  -Afib with RVR: new onset, in the setting of sepsis, HF. No repeat episodes Continue with BB, uptitrate as tolerated. 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 Given recent hospitalization, will treat for HCAP for total of 10 days. Blood cx NGTD  -Completed aztreonam and vanc dosed by level 10day course. Now afebrile, leukocytosis downtrending  -Wean off oxygen as tolerated    #Renal:   1) DONNIE on CKD in the setting of recent surgery, iv contrast for imaging. Renal US significant for high grade stenosis at the origin of the renal artery.  During cath, renal artery could not be engaged, unclear relation of stent structure to artery. Baseline SCr 1.2 - 1.3  2) Right renal artery stenosis  3) L RCC s/p nephrectomy, R renal mass likely RCC recurrence  CTA aorta shows stent extends past R renal artery to SMA  Monitor Scr, electrolytes, UOP. Removed 4L UF but still on HFNC  -Unable to stent R renal artery for WOOD 2/13  -HD today  -Holding argatroban today for permacath placement via IR tmrw    #Heme/Onc:   1) Heparin-induced thrombocytopenia  2) R renal mass likely RCC  Spoke with pt's outpatient urologist Dr. Santiago. He is aware of the lesion on pt's right kidney but says given its size, does not require intervention at this time and states we should prioritize treating potential ischemic disease. He will continue to monitor the mass as outpatient.   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 WBC downtrending.  -Completed aztreonam and vanc dosed by level 10day course. Now afebrile, leukocytosis downtrending    #GI: DASH diet, no issues  -trend LFTs on Argatroban    DVT ppx: argatroban 77 year old man with past medical history of AAA 5.5cm s/p EVAAR repair on 1/29 with incidental finding of Right Neoplasm, previous history of Left Neoplasm with Left Nephrectomy in 2009, Bladder Cancer, Known LBBB, HTN, HLD presents with hypoxic respiratory failure 2/2 to pulmonary edema  2/2 ADHF + multifocal PNA in the setting of DONNIE on CKD with evidence of severe renal artery stenosis and TVD on cath    #Neuro: AOx2-3  Confused at times, likely delirium in the setting of hospital stay. Able to easily re-orient  -avoid benzodiazepines if possible     #CV:  1) Pulmonary edema 2/2 to ADHF + volume overload in the setting of acute renal failure. Patient still unable to lay flat  -continue dialysis to help remove volume  -Renal following  -Continue high flow and wean off to nasal cannula as tolerated   2) NSTEMI  -c/w ASA, metoprolol statin   -when more stable plan for PCI   3) AAA 5.5cm s/p EVAR repair with stents 1/29 with Dr. Wyatt Young  -TTE with evidence of severe LV dysfunction (EF 25-30%) with moderate AS, Given LHC findings, likely ischemic cardiomyopathy.   -c/w hydralazine 100 q8, isordil 10 TID, toprol XL 50mg daily for preload/afterload reduction. Continue to monitor oxy sat/BP  -Afib with RVR: new onset, in the setting of sepsis, HF. No repeat episodes Continue with BB, uptitrate as tolerated. 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 Given recent hospitalization, will treat for HCAP for total of 10 days. Blood cx NGTD  -Completed aztreonam and vanc dosed by level 10day course. Now afebrile, leukocytosis downtrending  -Wean off oxygen as tolerated  2) Hemoptysis   could be secondary to argatroban use, pulmonary hemorrhage, pulmonary edema, less likely TRALI, afebrile so unlikely recurrance of pneumonia or viral process   CBC improved though overnight   -continue to monitor   -consider holding argatroban if hemoptysis worsens     #Renal:   1) DONNIE on CKD in the setting of recent surgery, iv contrast for imaging. Renal US significant for high grade stenosis at the origin of the renal artery.  During cath, renal artery could not be engaged, unclear relation of stent structure to artery. Baseline SCr 1.2 - 1.3  2) Right renal artery stenosis  3) L RCC s/p nephrectomy, R renal mass likely RCC recurrence  CTA aorta shows stent extends past R renal artery to SMA  Monitor Scr, electrolytes, UOP. Removed 4L UF but still on HFNC  -Unable to stent R renal artery for WOOD 2/13  -HD today  -permacath placement cancelled today (2/14) as patient could not lay flat, patient on schedule for tomorrow  -Holding argatroban today for permacath placement via IR tmrw,     #Heme/Onc:   1) Heparin-induced thrombocytopenia  2) R renal mass likely RCC  Spoke with pt's outpatient urologist Dr. Santiago. He is aware of the lesion on pt's right kidney but says given its size, does not require intervention at this time and states we should prioritize treating potential ischemic disease. He will continue to monitor the mass as outpatient.   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 WBC downtrending.  -Completed aztreonam and vanc dosed by level 10day course. Now afebrile, leukocytosis downtrending    #GI: DASH diet, no issues  -trend LFTs on Argatroban    DVT ppx: argatroban

## 2018-02-15 NOTE — CHART NOTE - NSCHARTNOTEFT_GEN_A_CORE
====================  CCU MIDNIGHT ROUNDS  ====================    DEUCE BALL  [id number]    ====================  SUMMARY: HPI:  This is a 77 year old man with past medical history of AAA 5.5cm s/p EVAAR repair with stents on 1/29 with incidental finding of Right Renal Neoplasm, previous history of Left Renal Neoplasm with Left Nephrectomy in 2008, Bladder Cancer, Known LBBB, HTN, HLD transferred from [facility] after initially presenting with SOB which began last night. Patient states that he was discharged from the hospital following AAA repair and recovering well.  He was walking with walker at home with no complaints.  After going to bed, he was awoken from his sleep complaining of SOB with no relief.  He went to [facility] wtih his SOB getting progressively worse with 1 episode of vomitus before going to hospital.  Patient denies fever, chills, recent sick contact, Chest pain, Abdominal pain, diarrhea.  At [place], paient with elevate d-dimers and transferred for possible PE.  At [facility], patient with  RICKI in V2-V4 with elevated Trops, and ADHF requiring Bipap. (04 Feb 2018 17:51)    ====================  ====================  NEW EVENTS: overnight patient had small episodes of blood tinged sputum. per family, this has been occurring past 3-4 days. patient also having some abd cramping, however is passing gas and made 2 bm yesterday.  patient made NPO at midnight, agatraban stopped for permacath placement tomorrow.   ====================    ====================  VITALS (Last 12 hrs):  ====================    T(C): 37.1 (02-15-18 @ 00:00), Max: 37.7 (02-14-18 @ 19:00)  HR: 96 (02-15-18 @ 00:00) (86 - 100)  BP: 115/63 (02-14-18 @ 21:00) (115/63 - 115/63)  BP(mean): 79 (02-14-18 @ 21:00) (79 - 79)  ABP: 126/54 (02-15-18 @ 00:00) (78/36 - 148/68)  ABP(mean): 78 (02-15-18 @ 00:00) (50 - 100)  RR: 21 (02-15-18 @ 00:00) (17 - 29)  SpO2: 97% (02-15-18 @ 00:00) (91% - 99%)    TELEMETRY: NSR 90-100s       I&O's Summary    13 Feb 2018 07:01  -  14 Feb 2018 07:00  --------------------------------------------------------  IN: 920.1 mL / OUT: 1500 mL / NET: -579.9 mL    14 Feb 2018 07:01  -  15 Feb 2018 00:53  --------------------------------------------------------  IN: 144 mL / OUT: 0 mL / NET: 144 mL      ====================  PLAN:  - npo at midnight for permacath placement   - agatraban hold at midnight  - monitor bowel movements and abd cramping. 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)  Hyperphosphatemia: Hyperphosphatemia  Acute renal failure superimposed on stage 3 chronic kidney disease, unspecified acute renal failure type: Acute renal failure superimposed on stage 3 chronic kidney disease, unspecified acute renal failure type  Acute decompensated heart failure: Acute decompensated heart failure  NSTEMI (non-ST elevated myocardial infarction): NSTEMI (non-ST elevated myocardial infarction)  Other hypervolemia: Other hypervolemia  Hyperkalemia: Hyperkalemia  DONNIE (acute kidney injury): DONNIE (acute kidney injury)      HEALTH ISSUES - R/O PROBLEM Dx:    Charlie Alvarez MD

## 2018-02-15 NOTE — PROGRESS NOTE ADULT - SUBJECTIVE AND OBJECTIVE BOX
Events noted  Unable to access right renal artery  Yesterday I had a long discussion regarding the situation with patient's daughter  I think a renal artery bypass may be a possibility however, the coronary issues need to be addressed given the triple vessel disease and decreased EF.  A bypass requires a laparotomy.  This was discussed with daughter    Will continue to follow

## 2018-02-15 NOTE — PROGRESS NOTE ADULT - SUBJECTIVE AND OBJECTIVE BOX
CC: F/U PNA, evaluation for permacath placement    Interval History/ROS: Patient remains with blood tinged sputum with cough. On high flow oxygen. Has no fever, chills. Went for IR placement of HD tunneled catheter, but was not able to lay flat.    Allergies  Cipro (Other)  heparin (Other (Severe))  Levaquin (Other)  penicillin (Hives)      ANTIMICROBIALS:  None      PE:    Vital Signs Last 24 Hrs  T(C): 37 (15 Feb 2018 11:55), Max: 37.7 (14 Feb 2018 19:00)  T(F): 98.6 (15 Feb 2018 11:55), Max: 99.9 (14 Feb 2018 19:00)  HR: 96 (15 Feb 2018 13:00) (86 - 108)  BP: 115/63 (14 Feb 2018 21:00) (115/63 - 115/63)  BP(mean): 79 (14 Feb 2018 21:00) (79 - 79)  RR: 26 (15 Feb 2018 13:00) (17 - 32)  SpO2: 97% (15 Feb 2018 13:00) (91% - 99%)    Gen: AOx3, NAD, non-toxic, on high flow oxygen  CV: S1+S2 normal, no murmurs  Resp: coarse breath sounds bilaterally  Abd: Soft, nontender, +BS  Ext: No LE edema, no wounds  IV/Skin: No thrombophlebitis, bilateral groin incision sites with staples - C/D/I  Neuro: no focal deficits    LABS:                          8.6    10.5  )-----------( 67       ( 15 Feb 2018 04:47 )             25.1       02-15    134<L>  |  95<L>  |  57<H>  ----------------------------<  112<H>  4.6   |  21<L>  |  5.76<H>    Ca    8.0<L>      15 Feb 2018 04:47  Phos  5.3     02-15  Mg     2.3     02-15    TPro  6.7  /  Alb  2.8<L>  /  TBili  0.3  /  DBili  x   /  AST  32  /  ALT  21  /  AlkPhos  62  02-15    MICROBIOLOGY:  v  .Blood Blood-Peripheral  02-10-18   No growth at 5 days.  --  --      .Blood Blood  02-04-18   No growth at 5 days.  --  --      .Urine Clean Catch (Midstream)  02-04-18   No growth  --  --    RADIOLOGY:    < from: Xray Chest 1 View- PORTABLE-Urgent (02.15.18 @ 06:51) >  Impression:    The heart is enlarged. Pulmonary edema. Bilateral upper lobe pneumonia.   The appearance of the chest appears to be worsening when compared to   previous study done February 13, 2018. A central line seen on the right   and tip is in superior vena cava.    < end of copied text >

## 2018-02-15 NOTE — PROVIDER CONTACT NOTE (CHANGE IN STATUS NOTIFICATION) - SITUATION
Pt increased anxiety 02 saturation has decreased during the night from 97% to 93%, pt coughing up bright red blood

## 2018-02-15 NOTE — PROGRESS NOTE ADULT - SUBJECTIVE AND OBJECTIVE BOX
[facility] Division of Kidney Diseases & Hypertension  FOLLOW UP NOTE  [phone]--------------------------------------------------------------------------------    HPI: 77 year old man with past medical history of AAA 5.5cm s/p EVAAR repair on 1/29 with incidental finding of Right Neoplasm, previous history of Left Neoplasm with Left Nephrectomy in 2009 transferred from [facility] for worsening SOB. Patient was found to have ODNNIE and fluid overload and was initiated on CVVHDF on 2/4/17 and was stopped on 2/6/18.  Had rapid afib with HD.  Ultrasound suggestive of high grade right renal artery narrowing.  Patient had CT angiogram over weekend which showed moderate to severe right renal artery stenosis.  Second PCI attempt for revascularization unsuccessful yesterday.  Currently patient is still on high flow nasal cannula; denies complains of CP, abdominal pain, nausea; however has complains of blood tinged sputum today. Patient went to IR today for tunneled HD catheter placement but was cancelled as he is not able to lie flat.     PAST HISTORY  --------------------------------------------------------------------------------  No significant changes to PMH, PSH, FHx, SHx, unless otherwise noted    ALLERGIES & MEDICATIONS  --------------------------------------------------------------------------------  Allergies    Cipro (Other)  heparin (Other (Severe))  Levaquin (Other)  penicillin (Hives)    Intolerances      Standing Inpatient Medications  aspirin enteric coated 81 milliGRAM(s) Oral daily  atorvastatin 80 milliGRAM(s) Oral at bedtime  docusate sodium 100 milliGRAM(s) Oral daily  hydrALAZINE 100 milliGRAM(s) Oral every 8 hours  isosorbide   dinitrate Tablet (ISORDIL) 10 milliGRAM(s) Oral three times a day  metoprolol succinate ER 50 milliGRAM(s) Oral daily  senna 1 Tablet(s) Oral at bedtime  sevelamer hydrochloride 1600 milliGRAM(s) Oral three times a day with meals  sodium chloride 0.9%. 250 milliLiter(s) IV Continuous <Continuous>    PRN Inpatient Medications  polyethylene glycol 3350 17 Gram(s) Oral daily PRN      REVIEW OF SYSTEMS  --------------------------------------------------------------------------------  Gen: Fatigue +  Resp: SOB +, hemoptysis present.   CV: no CP  GI: abdominal distention +    VITALS/PHYSICAL EXAM  --------------------------------------------------------------------------------  T(C): 36.8 (02-15-18 @ 08:00), Max: 37.7 (02-14-18 @ 19:00)  HR: 98 (02-15-18 @ 10:00) (84 - 108)  BP: 115/63 (02-14-18 @ 21:00) (115/63 - 115/63)  RR: 20 (02-15-18 @ 10:00) (16 - 32)  SpO2: 96% (02-15-18 @ 10:00) (91% - 99%)  Wt(kg): --        02-14-18 @ 07:01  -  02-15-18 @ 07:00  --------------------------------------------------------  IN: 204 mL / OUT: 0 mL / NET: 204 mL    02-15-18 @ 07:01  -  02-15-18 @ 11:33  --------------------------------------------------------  IN: 30 mL / OUT: 0 mL / NET: 30 mL      Physical Exam:  	 Gen: NAD on hi flow  	HEENT: anicteric  	Pulm: diminished breath sounds  	CV: RRR s1 and s2  	Abd: soft, nontender, nondistended  	LE: edema improved  	Skin: Warm and dry   	Vascular access: right non tunneled HD catheter present dry crusted blood noted.    LABS/STUDIES  --------------------------------------------------------------------------------              8.6    10.5  >-----------<  67       [02-15-18 @ 04:47]              25.1     134  |  95  |  57  ----------------------------<  112      [02-15-18 @ 04:47]  4.6   |  21  |  5.76        Ca     8.0     [02-15-18 @ 04:47]      Mg     2.3     [02-15-18 @ 04:47]      Phos  5.3     [02-15-18 @ 04:47]    TPro  6.7  /  Alb  2.8  /  TBili  0.3  /  DBili  x   /  AST  32  /  ALT  21  /  AlkPhos  62  [02-15-18 @ 04:47]    PT/INR: PT 13.2 , INR 1.21       [02-15-18 @ 04:47]  PTT: 35.6       [02-15-18 @ 04:47]      Creatinine Trend:  SCr 5.76 [02-15 @ 04:47]  SCr 7.11 [02-14 @ 06:16]  SCr 9.22 [02-13 @ 19:12]  SCr 7.92 [02-13 @ 04:28]  SCr 4.95 [02-12 @ 02:21]

## 2018-02-15 NOTE — PROGRESS NOTE ADULT - PROBLEM SELECTOR PLAN 1
Patient with DONNIE on CKD in setting of significant renal artery occlusion: Baseline creatinine is between 1.2-1.3. Scr. on presentation was 6.48. Complements noted to be WNL.  Patient was initiated on CVVHDF on 2/5/18 and transitioned to IHD. Patient is currently dialysis dependent. Recommend placement of tunneled HD catheter once medically optimized. Will arrange for HD/UF today. Monitor BMP daily.

## 2018-02-15 NOTE — CHART NOTE - NSCHARTNOTEFT_GEN_A_CORE
====================  CCU MIDNIGHT ROUNDS  ====================    DEUCE BALL  [id number]    ====================  SUMMARY: HPI:  This is a 77 year old man with past medical history of AAA 5.5cm s/p EVAAR repair with stents on 1/29 with incidental finding of Right Renal Neoplasm, previous history of Left Renal Neoplasm with Left Nephrectomy in 2008, Bladder Cancer, Known LBBB, HTN, HLD transferred from [facility] after initially presenting with SOB which began last night. Patient states that he was discharged from the hospital following AAA repair and recovering well.  He was walking with walker at home with no complaints.  After going to bed, he was awoken from his sleep complaining of SOB with no relief.  He went to [facility] wtih his SOB getting progressively worse with 1 episode of vomitus before going to hospital.  Patient denies fever, chills, recent sick contact, Chest pain, Abdominal pain, diarrhea.  At [place], paient with elevate d-dimers and transferred for possible PE.  At [facility], patient with  RICKI in V2-V4 with elevated Trops, and ADHF requiring Bipap. (04 Feb 2018 17:51)    ====================  ====================  NEW EVENTS: no acute events overnight. patient with 1- 2 episodes of hemoptysis. otherwise stable. HD earlier today with removal of 2 Liters.   ====================      ====================  VITALS (Last 12 hrs):  ====================    T(C): 36.9 (02-15-18 @ 17:00), Max: 37 (02-15-18 @ 11:55)  HR: 100 (02-15-18 @ 21:00) (90 - 101)  BP: 133/75 (02-15-18 @ 20:00) (133/75 - 133/75)  BP(mean): 93 (02-15-18 @ 20:00) (93 - 93)  ABP: 150/68 (02-15-18 @ 21:00) (108/48 - 152/72)  ABP(mean): 100 (02-15-18 @ 21:00) (68 - 110)  RR: 25 (02-15-18 @ 21:00) (18 - 26)  SpO2: 97% (02-15-18 @ 21:00) (94% - 99%)    TELEMETRY:        I&O's Summary    14 Feb 2018 07:01  -  15 Feb 2018 07:00  --------------------------------------------------------  IN: 204 mL / OUT: 0 mL / NET: 204 mL    15 Feb 2018 07:01  -  15 Feb 2018 22:31  --------------------------------------------------------  IN: 1185.9 mL / OUT: 2200 mL / NET: -1014.1 mL      ====================  PLAN:  - NPO at midnight  - agatroban at midnight for permacath in the am  - valium for anxiety   ====================    HEALTH ISSUES - PROBLEM Dx:  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)  Hyperphosphatemia: Hyperphosphatemia  Acute renal failure superimposed on stage 3 chronic kidney disease, unspecified acute renal failure type: Acute renal failure superimposed on stage 3 chronic kidney disease, unspecified acute renal failure type  Acute decompensated heart failure: Acute decompensated heart failure  NSTEMI (non-ST elevated myocardial infarction): NSTEMI (non-ST elevated myocardial infarction)  Other hypervolemia: Other hypervolemia  Hyperkalemia: Hyperkalemia  DONNIE (acute kidney injury): DONNIE (acute kidney injury)        HEALTH ISSUES - R/O PROBLEM Dx:    Charlie Alvarez MD

## 2018-02-15 NOTE — PROGRESS NOTE ADULT - SUBJECTIVE AND OBJECTIVE BOX
Patient is a 77y old  Male who presents with a chief complaint of Shortness of Breath (04 Feb 2018 17:51)    Event	  HPI:  This is a 77 year old man with past medical history of AAA 5.5cm s/p EVAAR repair with stents on 1/29 with incidental finding of Right Renal Neoplasm, previous history of Left Renal Neoplasm with Left Nephrectomy in 2008, Bladder Cancer, Known LBBB, HTN, HLD transferred from [facility] after initially presenting with SOB which began last night. Patient states that he was discharged from the hospital following AAA repair and recovering well.  He was walking with walker at home with no complaints.  After going to bed, he was awoken from his sleep complaining of SOB with no relief.  He went to [facility] wtih his SOB getting progressively worse with 1 episode of vomitus before going to hospital.  Patient denies fever, chills, recent sick contact, Chest pain, Abdominal pain, diarrhea.  At [place], paient with elevate d-dimers and transferred for possible PE.  At [facility], patient with  RICKI in V2-V4 with elevated Trops, and ADHF requiring Bipap. (04 Feb 2018 17:51)    MEDICATIONS  (STANDING):  aspirin enteric coated 81 milliGRAM(s) Oral daily  atorvastatin 80 milliGRAM(s) Oral at bedtime  docusate sodium 100 milliGRAM(s) Oral daily  hydrALAZINE 100 milliGRAM(s) Oral every 8 hours  isosorbide   dinitrate Tablet (ISORDIL) 10 milliGRAM(s) Oral three times a day  metoprolol succinate ER 50 milliGRAM(s) Oral daily  senna 1 Tablet(s) Oral at bedtime  sevelamer hydrochloride 1600 milliGRAM(s) Oral three times a day with meals  sodium chloride 0.9%. 250 milliLiter(s) (10 mL/Hr) IV Continuous <Continuous>    MEDICATIONS  (PRN):  polyethylene glycol 3350 17 Gram(s) Oral daily PRN Constipation      REVIEW OF SYSTEMS:  Otherwise, 10 point ROS done and otherwise negative.    PHYSICAL EXAM:  Vital Signs Last 24 Hrs  T(C): 36.8 (15 Feb 2018 06:00), Max: 37.7 (14 Feb 2018 19:00)  T(F): 98.2 (15 Feb 2018 06:00), Max: 99.9 (14 Feb 2018 19:00)  HR: 94 (15 Feb 2018 07:00) (84 - 108)  BP: 115/63 (14 Feb 2018 21:00) (115/63 - 115/63)  BP(mean): 79 (14 Feb 2018 21:00) (79 - 79)  RR: 27 (15 Feb 2018 07:00) (16 - 32)  SpO2: 97% (15 Feb 2018 07:00) (91% - 99%)  I&O's Summary    14 Feb 2018 07:01  -  15 Feb 2018 07:00  --------------------------------------------------------  IN: 204 mL / OUT: 0 mL / NET: 204 mL        Appearance: Normal	  HEENT:   Normal oral mucosa, PERRL, EOMI	  Lymphatic: No lymphadenopathy  Cardiovascular: Normal S1 S2, No JVD, No murmurs, No edema  Respiratory: Lungs clear to auscultation	  Psychiatry: A & O x 3, Mood & affect appropriate  Gastrointestinal:  Soft, Non-tender, + BS	  Skin: No rashes, No ecchymoses, No cyanosis	  Neurologic: Non-focal  Extremities: Normal range of motion, No clubbing, cyanosis or edema  Vascular: Peripheral pulses palpable 2+ bilaterally    LABS:	 	                        8.6    10.5  )-----------( 67       ( 15 Feb 2018 04:47 )             25.1     Auto Eosinophil # x     / Auto Eosinophil % x     / Auto Neutrophil # x     / Auto Neutrophil % x     / BANDS % x                            7.1    11.2  )-----------( 82       ( 14 Feb 2018 16:23 )             20.8     Auto Eosinophil # x     / Auto Eosinophil % x     / Auto Neutrophil # x     / Auto Neutrophil % x     / BANDS % x                            7.6    11.6  )-----------( 72       ( 14 Feb 2018 06:16 )             23.1     Auto Eosinophil # 0.3   / Auto Eosinophil % 2.2   / Auto Neutrophil # 10.1  / Auto Neutrophil % 87.0  / BANDS % x        INR: 1.21 ratio (02-15 @ 04:47)  INR: 1.45 ratio (02-14 @ 15:20)  INR: 1.78 ratio (02-14 @ 08:03)    02-15    134<L>  |  95<L>  |  57<H>  ----------------------------<  112<H>  4.6   |  21<L>  |  5.76<H>  02-14    131<L>  |  90<L>  |  75<H>  ----------------------------<  98  4.7   |  22  |  7.11<H>  02-13    132<L>  |  89<L>  |  96<H>  ----------------------------<  97  5.4<H>   |  18<L>  |  9.22<H>    Ca    8.0<L>      15 Feb 2018 04:47  Mg     2.3     02-15  Phos  5.3     02-15  TPro  6.7  /  Alb  2.8<L>  /  TBili  0.3  /  DBili  x   /  AST  32  /  ALT  21  /  AlkPhos  62  02-15  TPro  6.4  /  Alb  2.7<L>  /  TBili  0.3  /  DBili  x   /  AST  31  /  ALT  23  /  AlkPhos  60  02-14  TPro  6.6  /  Alb  2.8<L>  /  TBili  0.3  /  DBili  x   /  AST  35  /  ALT  26  /  AlkPhos  61  02-13        proBNP:   Lipid Profile: 02-04 Chol 145 LDL 87 HDL 26<L> Trig 162<H>  HgA1c: 5.4 % (02-04 @ 21:37)    TSH:     CARDIAC MARKERS:   12 Feb 2018 02:21 Troponin 0.62 ng/mL / Creatine Kinase 61 U/L /  CKMB 2.7 ng/mL / CPK Mass Assay % x       05 Feb 2018 05:05 Troponin 0.73 ng/mL / Creatine Kinase 156 U/L /  CKMB 14.0 ng/mL / CPK Mass Assay % 9.0 %   05 Feb 2018 01:17 Troponin 0.80 ng/mL / Creatine Kinase 158 U/L /  CKMB 15.2 ng/mL / CPK Mass Assay % 9.6 %        TELEMETRY: 	    ECG:  	  RADIOLOGY:  OTHER: 	    PREVIOUS DIAGNOSTIC TESTING:    [ ] Echocardiogram:  [ ]  Catheterization:  [ ] Stress Test: Patient is a 77y old  Male who presents with a chief complaint of Shortness of Breath (04 Feb 2018 17:51)    Event: yesterday unsuccessful revascularization or renal artery. Overnight had episode of hemoptysis. Attempted to go for permacath but unable to lay flat so procedure was cancelled.   	  HPI:  This is a 77 year old man with past medical history of AAA 5.5cm s/p EVAAR repair with stents on 1/29 with incidental finding of Right Renal Neoplasm, previous history of Left Renal Neoplasm with Left Nephrectomy in 2008, Bladder Cancer, Known LBBB, HTN, HLD transferred from [facility] after initially presenting with SOB which began last night. Patient states that he was discharged from the hospital following AAA repair and recovering well.  He was walking with walker at home with no complaints.  After going to bed, he was awoken from his sleep complaining of SOB with no relief.  He went to [facility] wtih his SOB getting progressively worse with 1 episode of vomitus before going to hospital.  Patient denies fever, chills, recent sick contact, Chest pain, Abdominal pain, diarrhea.  At [place], paient with elevate d-dimers and transferred for possible PE.  At [facility], patient with  RICKI in V2-V4 with elevated Trops, and ADHF requiring Bipap. (04 Feb 2018 17:51)    MEDICATIONS  (STANDING):  aspirin enteric coated 81 milliGRAM(s) Oral daily  atorvastatin 80 milliGRAM(s) Oral at bedtime  docusate sodium 100 milliGRAM(s) Oral daily  hydrALAZINE 100 milliGRAM(s) Oral every 8 hours  isosorbide   dinitrate Tablet (ISORDIL) 10 milliGRAM(s) Oral three times a day  metoprolol succinate ER 50 milliGRAM(s) Oral daily  senna 1 Tablet(s) Oral at bedtime  sevelamer hydrochloride 1600 milliGRAM(s) Oral three times a day with meals  sodium chloride 0.9%. 250 milliLiter(s) (10 mL/Hr) IV Continuous <Continuous>    MEDICATIONS  (PRN):  polyethylene glycol 3350 17 Gram(s) Oral daily PRN Constipation      REVIEW OF SYSTEMS:  Otherwise, 10 point ROS done and otherwise negative.    PHYSICAL EXAM:  Vital Signs Last 24 Hrs  T(C): 36.8 (15 Feb 2018 06:00), Max: 37.7 (14 Feb 2018 19:00)  T(F): 98.2 (15 Feb 2018 06:00), Max: 99.9 (14 Feb 2018 19:00)  HR: 94 (15 Feb 2018 07:00) (84 - 108)  BP: 115/63 (14 Feb 2018 21:00) (115/63 - 115/63)  BP(mean): 79 (14 Feb 2018 21:00) (79 - 79)  RR: 27 (15 Feb 2018 07:00) (16 - 32)  SpO2: 97% (15 Feb 2018 07:00) (91% - 99%)  I&O's Summary    14 Feb 2018 07:01  -  15 Feb 2018 07:00  --------------------------------------------------------  IN: 204 mL / OUT: 0 mL / NET: 204 mL        Appearance: Normal	  HEENT:   Normal oral mucosa, PERRL, EOMI	  Lymphatic: No lymphadenopathy  Cardiovascular: Normal S1 S2, No JVD, No murmurs, No edema  Respiratory: Lungs clear to auscultation	  Psychiatry: A & O x 3, Mood & affect appropriate  Gastrointestinal:  Soft, Non-tender, + BS	  Skin: No rashes, No ecchymoses, No cyanosis	  Neurologic: Non-focal  Extremities: Normal range of motion, No clubbing, cyanosis or edema  Vascular: Peripheral pulses palpable 2+ bilaterally    LABS:	 	                        8.6    10.5  )-----------( 67       ( 15 Feb 2018 04:47 )             25.1     Auto Eosinophil # x     / Auto Eosinophil % x     / Auto Neutrophil # x     / Auto Neutrophil % x     / BANDS % x                            7.1    11.2  )-----------( 82       ( 14 Feb 2018 16:23 )             20.8     Auto Eosinophil # x     / Auto Eosinophil % x     / Auto Neutrophil # x     / Auto Neutrophil % x     / BANDS % x                            7.6    11.6  )-----------( 72       ( 14 Feb 2018 06:16 )             23.1     Auto Eosinophil # 0.3   / Auto Eosinophil % 2.2   / Auto Neutrophil # 10.1  / Auto Neutrophil % 87.0  / BANDS % x        INR: 1.21 ratio (02-15 @ 04:47)  INR: 1.45 ratio (02-14 @ 15:20)  INR: 1.78 ratio (02-14 @ 08:03)    02-15    134<L>  |  95<L>  |  57<H>  ----------------------------<  112<H>  4.6   |  21<L>  |  5.76<H>  02-14    131<L>  |  90<L>  |  75<H>  ----------------------------<  98  4.7   |  22  |  7.11<H>  02-13    132<L>  |  89<L>  |  96<H>  ----------------------------<  97  5.4<H>   |  18<L>  |  9.22<H>    Ca    8.0<L>      15 Feb 2018 04:47  Mg     2.3     02-15  Phos  5.3     02-15  TPro  6.7  /  Alb  2.8<L>  /  TBili  0.3  /  DBili  x   /  AST  32  /  ALT  21  /  AlkPhos  62  02-15  TPro  6.4  /  Alb  2.7<L>  /  TBili  0.3  /  DBili  x   /  AST  31  /  ALT  23  /  AlkPhos  60  02-14  TPro  6.6  /  Alb  2.8<L>  /  TBili  0.3  /  DBili  x   /  AST  35  /  ALT  26  /  AlkPhos  61  02-13        proBNP:   Lipid Profile: 02-04 Chol 145 LDL 87 HDL 26<L> Trig 162<H>  HgA1c: 5.4 % (02-04 @ 21:37)    TSH:     CARDIAC MARKERS:   12 Feb 2018 02:21 Troponin 0.62 ng/mL / Creatine Kinase 61 U/L /  CKMB 2.7 ng/mL / CPK Mass Assay % x       05 Feb 2018 05:05 Troponin 0.73 ng/mL / Creatine Kinase 156 U/L /  CKMB 14.0 ng/mL / CPK Mass Assay % 9.0 %   05 Feb 2018 01:17 Troponin 0.80 ng/mL / Creatine Kinase 158 U/L /  CKMB 15.2 ng/mL / CPK Mass Assay % 9.6 %        TELEMETRY: no acute events, NSR 	    ECG:    RADIOLOGY:  OTHER: 	    PREVIOUS DIAGNOSTIC TESTING:    [ ] Echocardiogram:  [ ]  Catheterization:  [ ] Stress Test:

## 2018-02-15 NOTE — PROGRESS NOTE ADULT - SUBJECTIVE AND OBJECTIVE BOX
Interventional Radiology  Pre-Procedure Note    77 year old male with recent AAA s/p EVAAR.  In CCU for diastolic heart failure.  DONNIE, requiring long term hemodialysis.  Recenty anticoagulated on argatroban gtt for rapid atrial fibrillation and HIT (anticoagulation off as of 12AM last night).        PAST MEDICAL & SURGICAL HISTORY:  Renal mass, right: current - following with Dr Santiago  BPH (benign prostatic hyperplasia)  Abdominal aortic aneurysm (AAA): 5.5 cm  Heel spur, left  Vertigo  Left bundle branch block  Hx antineoplastic chemotherapy (BCG )  Lt Renal Neoplasm: left - s/p left nephrectomy  Hyperlipemia (ICD9 272.4): dx   Bladder Cancer (ICD9 188.9): TCC, dx   HTN: dx   H/O abdominal aortic aneurysm repair  History of cystoscopy: 2015  History of Lt  Nephrectomy: 6/10 - left  S/P Tonsillectomy  S/P Cystoscopy: bladder polyps removal multiple times (since ), 6/10 , 10/2011 &amp; 10/17/2011, , 2012, last 13, 2013, 2014  urethral Stent 2008: stent placement and removal      Social History:     FAMILY HISTORY:  Family history of MI (myocardial infarction) (Father, Mother): father  74y/o MI, mother  96y/o alzheimers      Allergies: Cipro (Other)  heparin (Other (Severe))  Levaquin (Other)  penicillin (Hives)      Current Medications: aspirin enteric coated 81 milliGRAM(s) Oral daily  atorvastatin 80 milliGRAM(s) Oral at bedtime  docusate sodium 100 milliGRAM(s) Oral daily  hydrALAZINE 100 milliGRAM(s) Oral every 8 hours  isosorbide   dinitrate Tablet (ISORDIL) 10 milliGRAM(s) Oral three times a day  metoprolol succinate ER 50 milliGRAM(s) Oral daily  polyethylene glycol 3350 17 Gram(s) Oral daily PRN  senna 1 Tablet(s) Oral at bedtime  sevelamer hydrochloride 1600 milliGRAM(s) Oral three times a day with meals  sodium chloride 0.9%. 250 milliLiter(s) IV Continuous <Continuous>      Labs:                         8.6    10.5  )-----------( 67       ( 15 Feb 2018 04:47 )             25.1       02-15    134<L>  |  95<L>  |  57<H>  ----------------------------<  112<H>  4.6   |  21<L>  |  5.76<H>    Ca    8.0<L>      15 2018 04:47  Phos  5.3     02-15  Mg     2.3     02-15    TPro  6.7  /  Alb  2.8<L>  /  TBili  0.3  /  DBili  x   /  AST  32  /  ALT  21  /  AlkPhos  62  02-15      HCG:     Blood Bank: 18  A  --  Positive    Assessment/Plan:   77 year old male with multiple comorbidities, admitted to CCU for management for heart failure exacerbation, DONNIE, referred for tunnelled dialysis catheter placement.    Procedure/ risks/ benefits/ goals/ alternatives were explained. All questions answered. Informed content obtained from patient. Consent placed in chart.

## 2018-02-16 DIAGNOSIS — J18.9 PNEUMONIA, UNSPECIFIED ORGANISM: ICD-10-CM

## 2018-02-16 DIAGNOSIS — I25.10 ATHEROSCLEROTIC HEART DISEASE OF NATIVE CORONARY ARTERY WITHOUT ANGINA PECTORIS: ICD-10-CM

## 2018-02-16 DIAGNOSIS — I50.21 ACUTE SYSTOLIC (CONGESTIVE) HEART FAILURE: ICD-10-CM

## 2018-02-16 LAB
ALBUMIN SERPL ELPH-MCNC: 2.8 G/DL — LOW (ref 3.3–5)
ALP SERPL-CCNC: 58 U/L — SIGNIFICANT CHANGE UP (ref 40–120)
ALT FLD-CCNC: 21 U/L RC — SIGNIFICANT CHANGE UP (ref 10–45)
ANION GAP SERPL CALC-SCNC: 20 MMOL/L — HIGH (ref 5–17)
APTT BLD: 40.2 SEC — HIGH (ref 27.5–37.4)
APTT BLD: 51.7 SEC — HIGH (ref 27.5–37.4)
APTT BLD: 58.6 SEC — HIGH (ref 27.5–37.4)
AST SERPL-CCNC: 33 U/L — SIGNIFICANT CHANGE UP (ref 10–40)
BILIRUB SERPL-MCNC: 0.4 MG/DL — SIGNIFICANT CHANGE UP (ref 0.2–1.2)
BUN SERPL-MCNC: 61 MG/DL — HIGH (ref 7–23)
CALCIUM SERPL-MCNC: 8.3 MG/DL — LOW (ref 8.4–10.5)
CHLORIDE SERPL-SCNC: 94 MMOL/L — LOW (ref 96–108)
CO2 SERPL-SCNC: 20 MMOL/L — LOW (ref 22–31)
CREAT SERPL-MCNC: 6.21 MG/DL — HIGH (ref 0.5–1.3)
GAS PNL BLDA: SIGNIFICANT CHANGE UP
GLUCOSE SERPL-MCNC: 99 MG/DL — SIGNIFICANT CHANGE UP (ref 70–99)
HCT VFR BLD CALC: 22.8 % — LOW (ref 39–50)
HCT VFR BLD CALC: 25 % — LOW (ref 39–50)
HGB BLD-MCNC: 7.7 G/DL — LOW (ref 13–17)
HGB BLD-MCNC: 8.4 G/DL — LOW (ref 13–17)
INR BLD: 1.3 RATIO — HIGH (ref 0.88–1.16)
INR BLD: 1.91 RATIO — HIGH (ref 0.88–1.16)
MAGNESIUM SERPL-MCNC: 2.4 MG/DL — SIGNIFICANT CHANGE UP (ref 1.6–2.6)
MCHC RBC-ENTMCNC: 31.5 PG — SIGNIFICANT CHANGE UP (ref 27–34)
MCHC RBC-ENTMCNC: 31.8 PG — SIGNIFICANT CHANGE UP (ref 27–34)
MCHC RBC-ENTMCNC: 33.6 GM/DL — SIGNIFICANT CHANGE UP (ref 32–36)
MCHC RBC-ENTMCNC: 33.9 GM/DL — SIGNIFICANT CHANGE UP (ref 32–36)
MCV RBC AUTO: 93.8 FL — SIGNIFICANT CHANGE UP (ref 80–100)
MCV RBC AUTO: 94 FL — SIGNIFICANT CHANGE UP (ref 80–100)
PHOSPHATE SERPL-MCNC: 6.4 MG/DL — HIGH (ref 2.5–4.5)
PLATELET # BLD AUTO: 104 K/UL — LOW (ref 150–400)
PLATELET # BLD AUTO: 92 K/UL — LOW (ref 150–400)
POTASSIUM SERPL-MCNC: 4.6 MMOL/L — SIGNIFICANT CHANGE UP (ref 3.5–5.3)
POTASSIUM SERPL-SCNC: 4.6 MMOL/L — SIGNIFICANT CHANGE UP (ref 3.5–5.3)
PROT SERPL-MCNC: 6.2 G/DL — SIGNIFICANT CHANGE UP (ref 6–8.3)
PROTHROM AB SERPL-ACNC: 14.2 SEC — HIGH (ref 9.8–12.7)
PROTHROM AB SERPL-ACNC: 21.1 SEC — HIGH (ref 9.8–12.7)
RBC # BLD: 2.43 M/UL — LOW (ref 4.2–5.8)
RBC # BLD: 2.66 M/UL — LOW (ref 4.2–5.8)
RBC # FLD: 12 % — SIGNIFICANT CHANGE UP (ref 10.3–14.5)
RBC # FLD: 12.3 % — SIGNIFICANT CHANGE UP (ref 10.3–14.5)
SODIUM SERPL-SCNC: 134 MMOL/L — LOW (ref 135–145)
WBC # BLD: 10.5 K/UL — SIGNIFICANT CHANGE UP (ref 3.8–10.5)
WBC # BLD: 10.7 K/UL — HIGH (ref 3.8–10.5)
WBC # FLD AUTO: 10.5 K/UL — SIGNIFICANT CHANGE UP (ref 3.8–10.5)
WBC # FLD AUTO: 10.7 K/UL — HIGH (ref 3.8–10.5)

## 2018-02-16 PROCEDURE — 36558 INSERT TUNNELED CV CATH: CPT

## 2018-02-16 PROCEDURE — 71045 X-RAY EXAM CHEST 1 VIEW: CPT | Mod: 26

## 2018-02-16 PROCEDURE — 93010 ELECTROCARDIOGRAM REPORT: CPT

## 2018-02-16 PROCEDURE — 99223 1ST HOSP IP/OBS HIGH 75: CPT | Mod: GC

## 2018-02-16 PROCEDURE — 76937 US GUIDE VASCULAR ACCESS: CPT | Mod: 26

## 2018-02-16 PROCEDURE — 99233 SBSQ HOSP IP/OBS HIGH 50: CPT

## 2018-02-16 PROCEDURE — 77001 FLUOROGUIDE FOR VEIN DEVICE: CPT | Mod: 26

## 2018-02-16 PROCEDURE — 99232 SBSQ HOSP IP/OBS MODERATE 35: CPT

## 2018-02-16 RX ORDER — SODIUM CHLORIDE 9 MG/ML
250 INJECTION INTRAMUSCULAR; INTRAVENOUS; SUBCUTANEOUS ONCE
Qty: 0 | Refills: 0 | Status: COMPLETED | OUTPATIENT
Start: 2018-02-16 | End: 2018-02-16

## 2018-02-16 RX ORDER — METOPROLOL TARTRATE 50 MG
2.5 TABLET ORAL ONCE
Qty: 0 | Refills: 0 | Status: COMPLETED | OUTPATIENT
Start: 2018-02-16 | End: 2018-02-16

## 2018-02-16 RX ORDER — ARGATROBAN 50 MG/50ML
2 INJECTION, SOLUTION INTRAVENOUS
Qty: 250 | Refills: 0 | Status: DISCONTINUED | OUTPATIENT
Start: 2018-02-16 | End: 2018-02-18

## 2018-02-16 RX ORDER — DIAZEPAM 5 MG
10 TABLET ORAL ONCE
Qty: 0 | Refills: 0 | Status: DISCONTINUED | OUTPATIENT
Start: 2018-02-16 | End: 2018-02-16

## 2018-02-16 RX ADMIN — Medication 3 MILLILITER(S): at 11:29

## 2018-02-16 RX ADMIN — SODIUM CHLORIDE 500 MILLILITER(S): 9 INJECTION INTRAMUSCULAR; INTRAVENOUS; SUBCUTANEOUS at 20:45

## 2018-02-16 RX ADMIN — Medication 2.5 MILLIGRAM(S): at 21:25

## 2018-02-16 RX ADMIN — Medication 10 MILLIGRAM(S): at 11:28

## 2018-02-16 RX ADMIN — ISOSORBIDE DINITRATE 10 MILLIGRAM(S): 5 TABLET ORAL at 06:04

## 2018-02-16 RX ADMIN — Medication 81 MILLIGRAM(S): at 11:28

## 2018-02-16 RX ADMIN — Medication 3 MILLILITER(S): at 00:36

## 2018-02-16 RX ADMIN — Medication 50 MILLIGRAM(S): at 06:04

## 2018-02-16 RX ADMIN — Medication 3 MILLILITER(S): at 05:24

## 2018-02-16 RX ADMIN — Medication 100 MILLIGRAM(S): at 06:04

## 2018-02-16 RX ADMIN — SEVELAMER CARBONATE 1600 MILLIGRAM(S): 2400 POWDER, FOR SUSPENSION ORAL at 20:44

## 2018-02-16 RX ADMIN — ATORVASTATIN CALCIUM 80 MILLIGRAM(S): 80 TABLET, FILM COATED ORAL at 21:01

## 2018-02-16 RX ADMIN — ARGATROBAN 8.16 MICROGRAM(S)/KG/MIN: 50 INJECTION, SOLUTION INTRAVENOUS at 16:30

## 2018-02-16 NOTE — PROGRESS NOTE ADULT - PROBLEM SELECTOR PLAN 3
Patient's volume status seems to be improved but still has evdince of pulmonary edema on examination.

## 2018-02-16 NOTE — PROVIDER CONTACT NOTE (OTHER) - ASSESSMENT
BP 78/36 Map 50 HR 92 Resp 27 O2Sat 91%; pt asymptomatic; denies chest pain, sob, or dizziness;
No other signs of bleeding noted.  Pt reports no SOB.
pt remains free from s/s bleeding, MAP remains >60

## 2018-02-16 NOTE — CONSULT NOTE ADULT - ASSESSMENT
77 year old man with past medical history of AAA 5.5cm s/p EVAAR repair with stents on 1/29 with incidental finding of Right Renal Neoplasm, previous history of Left Renal Neoplasm with Left Nephrectomy in 2008, Bladder Cancer, Known LBBB, HTN, HLD transferred from [facility] after initially presenting with SOB found DONNIE and HFrEF transferred with elevate d-dimers concern PE. At [facility], patient with  RICKI in V2-V4 with elevated Trops, and ADHF requiring Bipap s/p diagnostic cath w m-d LAD 80%, OM1 70%, depressed EF 20-25%, and WOOD 99% possibly from plaque shift after EVAR    high MAPs 90-100s, oxygenating well on 5L NC  balance -800cc after 1.5 fluid removal via HD, no urine  wbc mild elev but afebrile w negative blood and urine cultures  hyponatremic low 130s,   cxr fissure lines prevalent and b/l pulm congestion 77 year old man with past medical history of AAA 5.5cm s/p EVAAR repair with stents on 1/29 with incidental finding of Right Renal Neoplasm, previous history of Left Renal Neoplasm with Left Nephrectomy in 2008, Bladder Cancer, Known LBBB, HTN, HLD transferred from [facility] after initially presenting with SOB found DONNIE and HFrEF transferred with elevate d-dimers concern PE. At [facility], patient with  RICKI in V2-V4 with elevated Trops, and ADHF requiring Bipap s/p diagnostic cath w m-d LAD 80%, OM1 70%, depressed EF 20-25%, and WOOD 99% possibly from plaque shift after EVAR, and multifocal pna    high MAPs 90-100s, oxygenating well on 5L NC  balance -800cc after 1.5 fluid removal via HD, no urine  wbc mild elev but afebrile w negative blood and urine cultures  hyponatremic low 130s,   cxr fissure lines prevalent and b/l pulm congestion 77 year old man with past medical history of AAA 5.5cm s/p EVAAR repair with stents on 1/29 with incidental finding of Right Renal Neoplasm, previous history of Left Renal Neoplasm with Left Nephrectomy in 2008, Bladder Cancer, Known LBBB, HTN, HLD transferred from [facility] after initially presenting with SOB found DONNIE and HFrEF transferred with elevate d-dimers concern PE. At [facility], patient with  RICKI in V2-V4 with elevated Trops, and ADHF requiring Bipap s/p diagnostic cath w m-d LAD 80%, D1 80%, OM1 70%, depressed EF 20-25% (somewhat improved on subsequent studies) and WOOD 99% possibly from plaque shift after EVAR, and multifocal pna    high MAPs 90-100s, oxygenating well on 5L NC  balance -800cc after 1.5 fluid removal via HD, no urine  wbc mild elev but afebrile w negative blood and urine cultures  hyponatremic low 130s,   cxr fissure lines prevalent and b/l pulm congestion 77 year old man with past medical history of AAA 5.5cm s/p EVAAR repair with stents on 1/29 with incidental finding of Right Renal Neoplasm, previous history of Left Renal Neoplasm with Left Nephrectomy in 2008, Bladder Cancer, Known LBBB, HTN, HLD transferred from [facility] after initially presenting with SOB found DONNIE and HFrEF transferred with elevate d-dimers concern PE. At [facility], patient with  RICKI in V2-V4 with elevated Trops, and ADHF requiring Bipap s/p diagnostic cath w m-d LAD 80%, D1 80%, OM1 70%, depressed EF 20-25% (somewhat improved on subsequent studies) and WOOD 99% possibly from plaque shift after EVAR, and multifocal pna.    Currently he is without significant volume overload on exam and his JVP is normal. He has peristent pulmonary infiltrates. His more recent echocardiogram shows significant improvement in his overall LV ejection fraction with anterapical hypokinesis. He remains hypertensive.

## 2018-02-16 NOTE — PROGRESS NOTE ADULT - SUBJECTIVE AND OBJECTIVE BOX
[facility] DIVISION OF KIDNEY DISEASES AND HYPERTENSION -- FOLLOW UP NOTE  --------------------------------------------------------------------------------    HPI: 77 year old man with past medical history of AAA 5.5cm s/p EVAAR repair on 1/29 with incidental finding of Right Neoplasm, previous history of Left Neoplasm with Left Nephrectomy in 2009 transferred from [facility] for worsening SOB. Patient was found to have DONNIE and fluid overload and was initiated on CVVHDF on 2/4/17 and was stopped on 2/6/18.  Had rapid afib with HD.  Ultrasound suggestive of high grade right renal artery narrowing.  Patient had CT angiogram which showed moderate to severe right renal artery stenosis.  Second PCI attempt for revascularization unsuccessful.  Currently patient is still on high flow nasal cannula; denies complains of CP, abdominal pain, nausea; however has complains of blood tinged sputum and insomnia. Patient went to IR yesterday for tunneled HD catheter placement but was cancelled as he is not able to lie flat.     PAST HISTORY  --------------------------------------------------------------------------------  No significant changes to PMH, PSH, FHx, SHx, unless otherwise noted    ALLERGIES & MEDICATIONS  --------------------------------------------------------------------------------  Allergies    Cipro (Other)  heparin (Other (Severe))  Levaquin (Other)  penicillin (Hives)    Intolerances      Standing Inpatient Medications  ALBUTerol/ipratropium for Nebulization 3 milliLiter(s) Nebulizer every 6 hours  aspirin enteric coated 81 milliGRAM(s) Oral daily  atorvastatin 80 milliGRAM(s) Oral at bedtime  docusate sodium 100 milliGRAM(s) Oral daily  hydrALAZINE 100 milliGRAM(s) Oral every 8 hours  isosorbide   dinitrate Tablet (ISORDIL) 10 milliGRAM(s) Oral three times a day  metoprolol succinate ER 50 milliGRAM(s) Oral daily  senna 1 Tablet(s) Oral at bedtime  sevelamer hydrochloride 1600 milliGRAM(s) Oral three times a day with meals    PRN Inpatient Medications  polyethylene glycol 3350 17 Gram(s) Oral daily PRN      REVIEW OF SYSTEMS  --------------------------------------------------------------------------------    as noted above    VITALS/PHYSICAL EXAM  --------------------------------------------------------------------------------  T(C): 36.6 (02-16-18 @ 06:00), Max: 37 (02-15-18 @ 11:55)  HR: 102 (02-16-18 @ 06:00) (89 - 103)  BP: 133/75 (02-15-18 @ 20:00) (133/75 - 133/75)  RR: 23 (02-16-18 @ 06:00) (18 - 29)  SpO2: 97% (02-16-18 @ 06:00) (94% - 99%)  Wt(kg): --        02-14-18 @ 07:01  -  02-15-18 @ 07:00  --------------------------------------------------------  IN: 204 mL / OUT: 0 mL / NET: 204 mL    02-15-18 @ 07:01  -  02-16-18 @ 06:51  --------------------------------------------------------  IN: 1330.5 mL / OUT: 2200 mL / NET: -869.5 mL      Physical Exam:              Gen: NAD on nasal cannula  	HEENT: anicteric  	Pulm: coarse rhoncherous breath sounds  	CV: RRR s1 and s2  	Abd: soft, nontender, nondistended  	LE: no edema  	Skin: Warm and dry   	Vascular access: right non tunneled HD catheter present dressing clean.          LABS/STUDIES  --------------------------------------------------------------------------------              8.4    10.7  >-----------<  92       [02-16-18 @ 04:06]              25.0     134  |  94  |  61  ----------------------------<  99      [02-16-18 @ 04:06]  4.6   |  20  |  6.21        Ca     8.3     [02-16-18 @ 04:06]      Mg     2.4     [02-16-18 @ 04:06]      Phos  6.4     [02-16-18 @ 04:06]    TPro  6.2  /  Alb  2.8  /  TBili  0.4  /  DBili  x   /  AST  33  /  ALT  21  /  AlkPhos  58  [02-16-18 @ 04:06]    PT/INR: PT 14.2 , INR 1.30       [02-16-18 @ 04:06]  PTT: 40.2       [02-16-18 @ 04:06]      Creatinine Trend:  SCr 6.21 [02-16 @ 04:06]  SCr 5.76 [02-15 @ 04:47]  SCr 7.11 [02-14 @ 06:16]  SCr 9.22 [02-13 @ 19:12]  SCr 7.92 [02-13 @ 04:28]    Urinalysis - [02-04-18 @ 13:17]      Color Yellow / Appearance Clear / SG 1.020 / pH 5.0      Gluc 50 / Ketone Negative  / Bili Negative / Urobili Negative       Blood Trace / Protein 30 / Leuk Est Trace / Nitrite Negative      RBC 0-2 / WBC 0-2 / Hyaline  / Gran  / Sq Epi  / Non Sq Epi Few / Bacteria Trace      HbA1c 5.4      [02-04-18 @ 21:37]  TSH 3.08      [02-04-18 @ 21:37]  Lipid: chol 145, , HDL 26, LDL 87      [02-04-18 @ 21:37]    HBsAb <3.0      [02-07-18 @ 23:02]  HBsAg Nonreact      [02-07-18 @ 23:02]  HCV 0.11, Nonreact      [02-07-18 @ 23:02]    C3 Complement 157      [02-05-18 @ 21:15]  C4 Complement 35      [02-05-18 @ 21:15]

## 2018-02-16 NOTE — CONSULT NOTE ADULT - PROBLEM SELECTOR RECOMMENDATION 9
-ICM, EF 20-25%  -eventual LHC for PCI  -cont hydralazine and isordil - increase isordil to 20mg tid  -cont metop succ - inc to 50mg tomorrow  -ACEi and spironolactone on hold until kidney status ascertained -cont hydralazine and isordil - increase isordil to 20mg tid  -cont metop succinate at current dose.  -ACEi and spironolactone on hold until kidney status ascertained

## 2018-02-16 NOTE — CHART NOTE - NSCHARTNOTEFT_GEN_A_CORE
Nutrition Note    Chart reviewed, events noted. Verbal consult received per pt's family's request to Renal HD diet education review. Family with many questions about foods are allowed in current Renal diet- wants to make food at home for pt, but was not sure what was appropriate. Renal diet education reviewed and possible meal choices discussed - low sodium chicken meatloaf, using alternative seasoning for flavoring. RD remains available.

## 2018-02-16 NOTE — PROGRESS NOTE ADULT - ASSESSMENT
77 year old man with past medical history of AAA 5.5cm s/p EVAAR repair on 1/29 with incidental finding of Right Neoplasm, previous history of Left Neoplasm with Left Nephrectomy in 2009, Bladder Cancer, Known LBBB, HTN, HLD presents with hypoxic respiratory failure 2/2 to pulmonary edema  2/2 ADHF + multifocal PNA in the setting of DONNIE on CKD with evidence of severe renal artery stenosis and TVD on cath    #Neuro: AOx2-3  Confused at times, likely delirium in the setting of hospital stay. Able to easily re-orient  -avoid benzodiazepines if possible     #CV:  1) Pulmonary edema 2/2 to ADHF + volume overload in the setting of acute renal failure. Patient still unable to lay flat  -continue dialysis to help remove volume  -Renal following  -Continue high flow and wean off to nasal cannula as tolerated   2) NSTEMI  -c/w ASA, metoprolol statin   -when more stable plan for PCI   3) AAA 5.5cm s/p EVAR repair with stents 1/29 with Dr. Wyatt Young  -TTE with evidence of severe LV dysfunction (EF 25-30%) with moderate AS, Given LHC findings, likely ischemic cardiomyopathy.   -c/w hydralazine 100 q8, isordil 10 TID, toprol XL 50mg daily for preload/afterload reduction. Continue to monitor oxy sat/BP  -Afib with RVR: new onset, in the setting of sepsis, HF. No repeat episodes Continue with BB, uptitrate as tolerated. 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 Given recent hospitalization, will treat for HCAP for total of 10 days. Blood cx NGTD  -Completed aztreonam and vanc dosed by level 10day course. Now afebrile, leukocytosis downtrending  -Wean off oxygen as tolerated  2) Hemoptysis   could be secondary to argatroban use, pulmonary hemorrhage, pulmonary edema, less likely TRALI, afebrile so unlikely recurrance of pneumonia or viral process   CBC improved though overnight   -continue to monitor   -consider holding argatroban if hemoptysis worsens     #Renal:   1) DONNIE on CKD in the setting of recent surgery, iv contrast for imaging. Renal US significant for high grade stenosis at the origin of the renal artery.  During cath, renal artery could not be engaged, unclear relation of stent structure to artery. Baseline SCr 1.2 - 1.3  2) Right renal artery stenosis  3) L RCC s/p nephrectomy, R renal mass likely RCC recurrence  CTA aorta shows stent extends past R renal artery to SMA  Monitor Scr, electrolytes, UOP. Removed 4L UF but still on HFNC  -Unable to stent R renal artery for WOOD 2/13  -HD today  -permacath placement cancelled today (2/14) as patient could not lay flat, patient on schedule for tomorrow  -Holding argatroban today for permacath placement via IR tmrw,     #Heme/Onc:   1) Heparin-induced thrombocytopenia  2) R renal mass likely RCC  Spoke with pt's outpatient urologist Dr. Santiago. He is aware of the lesion on pt's right kidney but says given its size, does not require intervention at this time and states we should prioritize treating potential ischemic disease. He will continue to monitor the mass as outpatient.   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 WBC downtrending.  -Completed aztreonam and vanc dosed by level 10day course. Now afebrile, leukocytosis downtrending    #GI: DASH diet, no issues  -trend LFTs on Argatroban    DVT ppx: argatroban 77 year old man with past medical history of AAA 5.5cm s/p EVAAR repair on 1/29 with incidental finding of Right Neoplasm, previous history of Left Neoplasm with Left Nephrectomy in 2009, Bladder Cancer, Known LBBB, HTN, HLD presents with hypoxic respiratory failure 2/2 to pulmonary edema  2/2 ADHF + multifocal PNA in the setting of DONNIE on CKD with evidence of severe renal artery stenosis and TVD on cath    #Neuro: AOx3 today  Confused at times, likely delirium in the setting of hospital stay. Able to easily re-orient  -avoid benzodiazepines if possible, although may need for anxiety     #CV:  1) Pulmonary edema 2/2 to ADHF + volume overload in the setting of acute renal failure. Patient still unable to lay flat  -continue dialysis to help remove volume  -Renal following  -Continue high flow and wean off to nasal cannula as tolerated   -permacath today  2) NSTEMI  -c/w ASA, metoprolol statin   -when more stable plan for PCI   3) AAA 5.5cm s/p EVAR repair with stents 1/29 with Dr. Wyatt Young  -TTE with evidence of severe LV dysfunction (EF 25-30%) with moderate AS, Given C findings, likely ischemic cardiomyopathy.   -c/w hydralazine 100 q8, toprol XL 50mg daily for preload/afterload reduction. Continue to monitor oxy sat/BP  -increase isordil 20 TID after dialysis today if patient tolerates.   -Afib with RVR: new onset, in the setting of sepsis, HF. No repeat episodes Continue with BB, uptitrate as tolerated. 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 Given recent hospitalization, will treat for HCAP for total of 10 days. Blood cx NGTD  -Completed aztreonam and vanc dosed by level 10day course. Now afebrile, leukocytosis downtrending  -Wean off oxygen as tolerated  2) Hemoptysis   could be secondary to argatroban use, pulmonary hemorrhage, pulmonary edema, less likely TRALI, afebrile so unlikely recurrance of pneumonia or viral process   CBC improved though overnight   -continue to monitor   -consider holding argatroban if hemoptysis worsens     #Renal:   1) DONNIE on CKD in the setting of recent surgery, iv contrast for imaging. Renal US significant for high grade stenosis at the origin of the renal artery.  During cath, renal artery could not be engaged, unclear relation of stent structure to artery. Baseline SCr 1.2 - 1.3  2) Right renal artery stenosis  3) L RCC s/p nephrectomy, R renal mass likely RCC recurrence  CTA aorta shows stent extends past R renal artery to SMA  Monitor Scr, electrolytes, UOP. Removed 4L UF but still on HFNC  -Unable to stent R renal artery for WOOD 2/13  -HD today  -permacath placement cancelled today (2/14) as patient could not lay flat, patient on schedule for tomorrow  -Holding argatroban today for permacath placement via IR tmrw,     #Heme/Onc:   1) Heparin-induced thrombocytopenia  2) R renal mass likely RCC  Spoke with pt's outpatient urologist Dr. Santiago. He is aware of the lesion on pt's right kidney but says given its size, does not require intervention at this time and states we should prioritize treating potential ischemic disease. He will continue to monitor the mass as outpatient.   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 WBC downtrending.  -Completed aztreonam and vanc dosed by level 10day course. Now afebrile, leukocytosis downtrending    #GI: DASH diet, no issues  -trend LFTs on Argatroban    DVT ppx: argatroban

## 2018-02-16 NOTE — PROVIDER CONTACT NOTE (OTHER) - BACKGROUND
s/p AAA repair; hx heart failure; s/p cardiac catherization - triple vessel disease;
Pt on 5L NC with humidification. Pt has HIT.  Argatroban DC'd at 0000.
argatroban gtt, protocol PTT goal is 50-90, CHF

## 2018-02-16 NOTE — PROGRESS NOTE ADULT - SUBJECTIVE AND OBJECTIVE BOX
Interventional Radiology Brief- Operative Note    Procedure: Tunneled dialysis catheter placement    Operators: Nevin Ambrocio    Anesthesia (type): MAC per anesthesiology    Contrast: None    EBL: Minimal    Findings/Follow up Plan of Care: Successful placement of 19 cm 14 FR tunneled dialysis catheter with tip at the superior cavoatrial junction via the right internal jugular vein. Indwelling right neck dialysis catheter removed.    Specimens Removed: Indwelling right neck nontunneled dialysis catheter    Implants: 19 cm 14 FR tunneled dialysis catheter    Complications: None    Condition/Disposition: Stable/Recovery    Please call Interventional Radiology x [phone] with any questions, concerns, or issues.

## 2018-02-16 NOTE — PROGRESS NOTE ADULT - SUBJECTIVE AND OBJECTIVE BOX
CC: F/U evaluation for permacath placement    Interval History/ROS: Patient s/p permcath placement today. Complaining of soreness at permacath site. Remains with cough but improving. Denies fever, chills, chest pain, abd pain, dysuria.    Allergies  Cipro (Other)  heparin (Other (Severe))  Levaquin (Other)  penicillin (Hives)      ANTIMICROBIALS:  None      PE:    Vital Signs Last 24 Hrs  T(C): 36.7 (16 Feb 2018 14:30), Max: 36.9 (15 Feb 2018 17:00)  T(F): 98 (16 Feb 2018 14:30), Max: 98.4 (15 Feb 2018 17:00)  HR: 88 (16 Feb 2018 15:15) (88 - 102)  BP: 133/75 (15 Feb 2018 20:00) (133/75 - 133/75)  BP(mean): 93 (15 Feb 2018 20:00) (93 - 93)  RR: 20 (16 Feb 2018 14:45) (18 - 29)  SpO2: 95% (16 Feb 2018 15:15) (94% - 99%)    Gen: AOx3, NAD, non-toxic  CV: S1+S2 normal, no murmurs  Resp: coarse breath sounds bilaterally  Abd: Soft, nontender, +BS  Ext: No LE edema, no wounds  : No CVA tenderness  IV/Skin: No thrombophlebitis  Neuro: no focal deficits    LABS:                          8.4    10.7  )-----------( 92       ( 16 Feb 2018 04:06 )             25.0       02-16    134<L>  |  94<L>  |  61<H>  ----------------------------<  99  4.6   |  20<L>  |  6.21<H>    Ca    8.3<L>      16 Feb 2018 04:06  Phos  6.4     02-16  Mg     2.4     02-16    TPro  6.2  /  Alb  2.8<L>  /  TBili  0.4  /  DBili  x   /  AST  33  /  ALT  21  /  AlkPhos  58  02-16      MICROBIOLOGY:  v  .Blood Blood-Peripheral  02-10-18   No growth at 5 days.  --  --      .Blood Blood  02-04-18   No growth at 5 days.  --  --      .Urine Clean Catch (Midstream)  02-04-18   No growth  --  --    RADIOLOGY:    < from: Xray Chest 1 View- PORTABLE-Routine (02.16.18 @ 09:30) >  The heart is slightly enlarged. Pulmonary edema. Multifocal pneumonia   cannot be ruled out. A central line seen on the right and tip is superior   vena cava. No change in appearance of the chest when compared to previous   study done February 15, 2018.    < end of copied text >

## 2018-02-16 NOTE — CONSULT NOTE ADULT - CONSULT REASON
Chest pain/SOB s/p recent EVAAR
HF optimization
HIT positive
jorge alberto on ckd
renal artery stenosis
ID evaluation for permacath placement

## 2018-02-16 NOTE — PROVIDER CONTACT NOTE (OTHER) - ACTION/TREATMENT ORDERED:
dialysis held;  MD Espinoza and NIDA Goncalves notified and aware; Vasopressin ordered @ 0.04units/min and started; will continue to monitor;
Continue to monitor.
okay to keep argatroban at current rate 2mcg/kg/min, PTT in 4 hours, monitor for s/s bleeding

## 2018-02-16 NOTE — PROGRESS NOTE ADULT - SUBJECTIVE AND OBJECTIVE BOX
Patient is a 77y old  Male who presents with a chief complaint of Shortness of Breath (04 Feb 2018 17:51)    Event	  HPI:  This is a 77 year old man with past medical history of AAA 5.5cm s/p EVAAR repair with stents on 1/29 with incidental finding of Right Renal Neoplasm, previous history of Left Renal Neoplasm with Left Nephrectomy in 2008, Bladder Cancer, Known LBBB, HTN, HLD transferred from [facility] after initially presenting with SOB which began last night. Patient states that he was discharged from the hospital following AAA repair and recovering well.  He was walking with walker at home with no complaints.  After going to bed, he was awoken from his sleep complaining of SOB with no relief.  He went to [facility] wtih his SOB getting progressively worse with 1 episode of vomitus before going to hospital.  Patient denies fever, chills, recent sick contact, Chest pain, Abdominal pain, diarrhea.  At [place], paient with elevate d-dimers and transferred for possible PE.  At [facility], patient with  RICKI in V2-V4 with elevated Trops, and ADHF requiring Bipap. (04 Feb 2018 17:51)    MEDICATIONS  (STANDING):  ALBUTerol/ipratropium for Nebulization 3 milliLiter(s) Nebulizer every 6 hours  aspirin enteric coated 81 milliGRAM(s) Oral daily  atorvastatin 80 milliGRAM(s) Oral at bedtime  docusate sodium 100 milliGRAM(s) Oral daily  hydrALAZINE 100 milliGRAM(s) Oral every 8 hours  isosorbide   dinitrate Tablet (ISORDIL) 10 milliGRAM(s) Oral three times a day  metoprolol succinate ER 50 milliGRAM(s) Oral daily  senna 1 Tablet(s) Oral at bedtime  sevelamer hydrochloride 1600 milliGRAM(s) Oral three times a day with meals    MEDICATIONS  (PRN):  polyethylene glycol 3350 17 Gram(s) Oral daily PRN Constipation      REVIEW OF SYSTEMS:  Otherwise, 10 point ROS done and otherwise negative.    PHYSICAL EXAM:  Vital Signs Last 24 Hrs  T(C): 36.6 (16 Feb 2018 06:00), Max: 37 (15 Feb 2018 11:55)  T(F): 97.9 (16 Feb 2018 06:00), Max: 98.6 (15 Feb 2018 11:55)  HR: 102 (16 Feb 2018 06:00) (89 - 103)  BP: 133/75 (15 Feb 2018 20:00) (133/75 - 133/75)  BP(mean): 93 (15 Feb 2018 20:00) (93 - 93)  RR: 23 (16 Feb 2018 06:00) (18 - 29)  SpO2: 97% (16 Feb 2018 06:00) (94% - 99%)  I&O's Summary    15 Feb 2018 07:01  -  16 Feb 2018 07:00  --------------------------------------------------------  IN: 1330.5 mL / OUT: 2200 mL / NET: -869.5 mL        Appearance: Normal	  HEENT:   Normal oral mucosa, PERRL, EOMI	  Lymphatic: No lymphadenopathy  Cardiovascular: Normal S1 S2, No JVD, No murmurs, No edema  Respiratory: Lungs clear to auscultation	  Psychiatry: A & O x 3, Mood & affect appropriate  Gastrointestinal:  Soft, Non-tender, + BS	  Skin: No rashes, No ecchymoses, No cyanosis	  Neurologic: Non-focal  Extremities: Normal range of motion, No clubbing, cyanosis or edema  Vascular: Peripheral pulses palpable 2+ bilaterally    LABS:	 	                        8.4    10.7  )-----------( 92       ( 16 Feb 2018 04:06 )             25.0     Auto Eosinophil # x     / Auto Eosinophil % x     / Auto Neutrophil # x     / Auto Neutrophil % x     / BANDS % x                            8.6    10.5  )-----------( 67       ( 15 Feb 2018 04:47 )             25.1     Auto Eosinophil # x     / Auto Eosinophil % x     / Auto Neutrophil # x     / Auto Neutrophil % x     / BANDS % x                            7.1    11.2  )-----------( 82       ( 14 Feb 2018 16:23 )             20.8     Auto Eosinophil # x     / Auto Eosinophil % x     / Auto Neutrophil # x     / Auto Neutrophil % x     / BANDS % x        INR: 1.30 ratio (02-16 @ 04:06)  INR: 1.72 ratio (02-15 @ 20:11)  INR: 1.66 ratio (02-15 @ 15:55)    02-16    134<L>  |  94<L>  |  61<H>  ----------------------------<  99  4.6   |  20<L>  |  6.21<H>  02-15    134<L>  |  95<L>  |  57<H>  ----------------------------<  112<H>  4.6   |  21<L>  |  5.76<H>    Ca    8.3<L>      16 Feb 2018 04:06  Mg     2.4     02-16  Phos  6.4     02-16  TPro  6.2  /  Alb  2.8<L>  /  TBili  0.4  /  DBili  x   /  AST  33  /  ALT  21  /  AlkPhos  58  02-16  TPro  6.7  /  Alb  2.8<L>  /  TBili  0.3  /  DBili  x   /  AST  32  /  ALT  21  /  AlkPhos  62  02-15        proBNP:   Lipid Profile: 02-04 Chol 145 LDL 87 HDL 26<L> Trig 162<H>  HgA1c: 5.4 % (02-04 @ 21:37)    TSH:     CARDIAC MARKERS:   12 Feb 2018 02:21 Troponin 0.62 ng/mL / Creatine Kinase 61 U/L /  CKMB 2.7 ng/mL / CPK Mass Assay % x       05 Feb 2018 05:05 Troponin 0.73 ng/mL / Creatine Kinase 156 U/L /  CKMB 14.0 ng/mL / CPK Mass Assay % 9.0 %   05 Feb 2018 01:17 Troponin 0.80 ng/mL / Creatine Kinase 158 U/L /  CKMB 15.2 ng/mL / CPK Mass Assay % 9.6 %        TELEMETRY: 	    ECG:  	  RADIOLOGY:  OTHER: 	    PREVIOUS DIAGNOSTIC TESTING:    [ ] Echocardiogram:  [ ]  Catheterization:  [ ] Stress Test: Patient is a 77y old  Male who presents with a chief complaint of Shortness of Breath (04 Feb 2018 17:51)    Event: No acute events overnight.     HPI:  This is a 77 year old man with past medical history of AAA 5.5cm s/p EVAAR repair with stents on 1/29 with incidental finding of Right Renal Neoplasm, previous history of Left Renal Neoplasm with Left Nephrectomy in 2008, Bladder Cancer, Known LBBB, HTN, HLD transferred from [facility] after initially presenting with SOB which began last night. Patient states that he was discharged from the hospital following AAA repair and recovering well.  He was walking with walker at home with no complaints.  After going to bed, he was awoken from his sleep complaining of SOB with no relief.  He went to [facility] wtih his SOB getting progressively worse with 1 episode of vomitus before going to hospital.  Patient denies fever, chills, recent sick contact, Chest pain, Abdominal pain, diarrhea.  At [place], paient with elevate d-dimers and transferred for possible PE.  At [facility], patient with  RICKI in V2-V4 with elevated Trops, and ADHF requiring Bipap. (04 Feb 2018 17:51)    MEDICATIONS  (STANDING):  ALBUTerol/ipratropium for Nebulization 3 milliLiter(s) Nebulizer every 6 hours  aspirin enteric coated 81 milliGRAM(s) Oral daily  atorvastatin 80 milliGRAM(s) Oral at bedtime  docusate sodium 100 milliGRAM(s) Oral daily  hydrALAZINE 100 milliGRAM(s) Oral every 8 hours  isosorbide   dinitrate Tablet (ISORDIL) 10 milliGRAM(s) Oral three times a day  metoprolol succinate ER 50 milliGRAM(s) Oral daily  senna 1 Tablet(s) Oral at bedtime  sevelamer hydrochloride 1600 milliGRAM(s) Oral three times a day with meals    MEDICATIONS  (PRN):  polyethylene glycol 3350 17 Gram(s) Oral daily PRN Constipation      REVIEW OF SYSTEMS:  Otherwise, 10 point ROS done and otherwise negative.    PHYSICAL EXAM:  Vital Signs Last 24 Hrs  T(C): 36.6 (16 Feb 2018 06:00), Max: 37 (15 Feb 2018 11:55)  T(F): 97.9 (16 Feb 2018 06:00), Max: 98.6 (15 Feb 2018 11:55)  HR: 102 (16 Feb 2018 06:00) (89 - 103)  BP: 133/75 (15 Feb 2018 20:00) (133/75 - 133/75)  BP(mean): 93 (15 Feb 2018 20:00) (93 - 93)  RR: 23 (16 Feb 2018 06:00) (18 - 29)  SpO2: 97% (16 Feb 2018 06:00) (94% - 99%)  I&O's Summary    15 Feb 2018 07:01  -  16 Feb 2018 07:00  --------------------------------------------------------  IN: 1330.5 mL / OUT: 2200 mL / NET: -869.5 mL        Appearance: well appearing	  HEENT:   Normal oral mucosa, PERRL, EOMI	  Lymphatic: No lymphadenopathy  Cardiovascular: Normal S1 S2, No JVD, No murmurs, No edema  Respiratory: on nasal cannula. Lungs clear to auscultation	  Psychiatry: A & O x 3, Mood & affect appropriate  Gastrointestinal:  Soft, Non-tender, + BS	  Skin: No rashes, No ecchymoses, No cyanosis	  Neurologic: Non-focal  Extremities: Normal range of motion, No clubbing, cyanosis or edema  Vascular: Peripheral pulses palpable 2+ bilaterally, right hemodialysis cath and left cordis c/d/i    LABS:	 	                        8.4    10.7  )-----------( 92       ( 16 Feb 2018 04:06 )             25.0     Auto Eosinophil # x     / Auto Eosinophil % x     / Auto Neutrophil # x     / Auto Neutrophil % x     / BANDS % x                            8.6    10.5  )-----------( 67       ( 15 Feb 2018 04:47 )             25.1     Auto Eosinophil # x     / Auto Eosinophil % x     / Auto Neutrophil # x     / Auto Neutrophil % x     / BANDS % x                            7.1    11.2  )-----------( 82       ( 14 Feb 2018 16:23 )             20.8     Auto Eosinophil # x     / Auto Eosinophil % x     / Auto Neutrophil # x     / Auto Neutrophil % x     / BANDS % x        INR: 1.30 ratio (02-16 @ 04:06)  INR: 1.72 ratio (02-15 @ 20:11)  INR: 1.66 ratio (02-15 @ 15:55)    02-16    134<L>  |  94<L>  |  61<H>  ----------------------------<  99  4.6   |  20<L>  |  6.21<H>  02-15    134<L>  |  95<L>  |  57<H>  ----------------------------<  112<H>  4.6   |  21<L>  |  5.76<H>    Ca    8.3<L>      16 Feb 2018 04:06  Mg     2.4     02-16  Phos  6.4     02-16  TPro  6.2  /  Alb  2.8<L>  /  TBili  0.4  /  DBili  x   /  AST  33  /  ALT  21  /  AlkPhos  58  02-16  TPro  6.7  /  Alb  2.8<L>  /  TBili  0.3  /  DBili  x   /  AST  32  /  ALT  21  /  AlkPhos  62  02-15        proBNP:   Lipid Profile: 02-04 Chol 145 LDL 87 HDL 26<L> Trig 162<H>  HgA1c: 5.4 % (02-04 @ 21:37)    TSH:     CARDIAC MARKERS:   12 Feb 2018 02:21 Troponin 0.62 ng/mL / Creatine Kinase 61 U/L /  CKMB 2.7 ng/mL / CPK Mass Assay % x       05 Feb 2018 05:05 Troponin 0.73 ng/mL / Creatine Kinase 156 U/L /  CKMB 14.0 ng/mL / CPK Mass Assay % 9.0 %   05 Feb 2018 01:17 Troponin 0.80 ng/mL / Creatine Kinase 158 U/L /  CKMB 15.2 ng/mL / CPK Mass Assay % 9.6 %        TELEMETRY: No acute events, NSR 	    ECG:  	  RADIOLOGY:  OTHER: 	    PREVIOUS DIAGNOSTIC TESTING:    [ ] Echocardiogram:  [ ]  Catheterization:  [ ] Stress Test:

## 2018-02-16 NOTE — PROGRESS NOTE ADULT - PROBLEM SELECTOR PLAN 1
Patient with DONNIE on CKD in setting of significant renal artery occlusion: Baseline creatinine is between 1.2-1.3. Scr. on presentation was 6.48. Complements noted to be WNL.  Patient was initiated on CVVHDF on 2/5/18 and transitioned to IHD. Patient is currently dialysis dependent. Recommend placement of tunneled HD catheter once medically optimized. Will arrange for HD/UF again today given pulmonary edema and bleeding. Monitor BMP daily.

## 2018-02-16 NOTE — CONSULT NOTE ADULT - SUBJECTIVE AND OBJECTIVE BOX
Patient seen and evaluated @ ccu 9    HPI:  77 year old man with past medical history of AAA 5.5cm s/p EVAAR repair with stents on  with incidental finding of Right Renal Neoplasm, previous history of Left Renal Neoplasm with Left Nephrectomy in , Bladder Cancer, Known LBBB, HTN, HLD transferred from [facility] after initially presenting with SOB found DONNIE and HFrEF. Patient states that he was discharged from the hospital following AAA repair Wednesday. Initially recovering well, however,  he awoke from his sleep complaining of SOB.  He went to [facility].  Patient denies fever, chills, recent sick contact, Chest pain, Abdominal pain, diarrhea.  At [place], [facility] with elevate d-dimers and transferred for possible PE.  At [facility], patient with  RICKI in V2-V4 with elevated Trops, and ADHF requiring Bipap. At [place], paient with elevate d-dimers and transferred for possible PE.  At [facility], patient with  RICKI in V2-V4 with elevated Trops, and ADHF requiring Bipap.    PMH:   Renal mass, right  BPH (benign prostatic hyperplasia)  Abdominal aortic aneurysm (AAA)  Heel spur, left  Vertigo  Left bundle branch block  Hx antineoplastic chemotherapy (BCG )  Lt Renal Neoplasm  LBBB (Left Bundle Branch Block)  Hyperlipemia (ICD9 272.4)  Bladder Cancer (ICD9 188.9)  HTN    PSH:   H/O abdominal aortic aneurysm repair  History of cystoscopy  S/P cystoscopy  History of Lt  Nephrectomy  S/P Tonsillectomy  S/P Cystoscopy  S/P Cystoscopy  S/P Cystoscopy  Ureter Surgery  urethral Stent 2008  Status Post Cystoscopy (ICD9 V45.89)    Medications:   ALBUTerol/ipratropium for Nebulization 3 milliLiter(s) Nebulizer every 6 hours  aspirin enteric coated 81 milliGRAM(s) Oral daily  atorvastatin 80 milliGRAM(s) Oral at bedtime  diazepam    Tablet 10 milliGRAM(s) Oral once  docusate sodium 100 milliGRAM(s) Oral daily  hydrALAZINE 100 milliGRAM(s) Oral every 8 hours  isosorbide   dinitrate Tablet (ISORDIL) 10 milliGRAM(s) Oral three times a day  metoprolol succinate ER 50 milliGRAM(s) Oral daily  polyethylene glycol 3350 17 Gram(s) Oral daily PRN  senna 1 Tablet(s) Oral at bedtime  sevelamer hydrochloride 1600 milliGRAM(s) Oral three times a day with meals    Allergies:  Cipro (Other)  heparin (Other (Severe))  Levaquin (Other)  penicillin (Hives)    FAMILY HISTORY:  Family history of MI (myocardial infarction) (Father, Mother): father  74y/o MI, mother  94y/o alzheimers    Social History:  Smoking: former smoker 25 pack years quit 30 yrs ago  Alcohol: denies  Drugs: denies    Review of Systems:  Constitutional: [ ] Fever [ ] Chills [ ] Fatigue [ ] Weight change   HEENT: [ ] Blurred vision [ ] Eye Pain [ ] Headache [ ] Runny nose [ ] Sore Throat   Respiratory: [ ] Cough [ ] Wheezing [ ] Shortness of breath  Cardiovascular: [ ] Chest Pain [ ] Palpitations [ ] GORDON [ ] PND [ x] Orthopnea  Gastrointestinal: [ ] Abdominal Pain [ ] Diarrhea [ ] Constipation [ ] Hemorrhoids [ ] Nausea [ ] Vomiting  Genitourinary: [ ] Nocturia [ ] Dysuria [ ] Incontinence  Extremities: [ ] Swelling [ ] Joint Pain  Neurologic: [ ] Focal deficit [ ] Paresthesias [ ] Syncope  Lymphatic: [ ] Swelling [ ] Lymphadenopathy   Skin: [ ] Rash [ ] Ecchymoses [ ] Wounds [ ] Lesions  Psychiatry: [ ] Depression [ ] Suicidal/Homicidal Ideation [ ] Anxiety [ ] Sleep Disturbances  [x] 10 point review of systems is otherwise negative except as mentioned above            [ ]Unable to obtain    Physical Exam:  T(C): 36.6 (18 @ 07:00), Max: 37 (02-15-18 @ 11:55)  HR: 92 (18 @ 09:00) (89 - 102)  BP: 133/75 (02-15-18 @ 20:00) (133/75 - 133/75)  RR: 18 (18 @ 09:00) (18 - 29)  SpO2: 99% (18 @ 09:00) (94% - 99%)  Wt(kg): --    02-15 @ 07:01  -   @ 07:00  --------------------------------------------------------  IN: 1330.5 mL / OUT: 2200 mL / NET: -869.5 mL      Daily     Daily Weight in k.9 (2018 06:00)    Appearance: [x] Normal [ x] NAD  Eyes: [x ] EOMI  HENT: [x] Normal oral muscosa x]NC/AT  Cardiovascular: [x ] S1 [x ] S2 [ x] RRR lsb 2/6 systolic [x ]No edema , JVD not well assessed central line b/l IJ  Respiratory: [x ] Clear to auscultation bilaterally  Gastrointestinal: [ x] Soft [ x] Non-tender x[ ] Non-distended x[ ] BS+  Musculoskeletal: x[ ] No clubbingx ] No joint deformity   Neurologic: [x ] Non-focal  Psychiatry: [ x] AAOx3 [x ] Mood & affect appropriate  Skin: [x ] No rashes [x ] No ecchymoses x ] No cyanosis    Cardiovascular Diagnostic Testing:    < from: Transthoracic Echocardiogram (18 @ 20:52) >  ------------------------------------------------------------------------  Dimensions:    Normal Values:  LA:            2.0 - 4.0 cm  Ao:            2.0 - 3.8 cm  SEPTUM:        0.6 - 1.2 cm  PWT:           0.6 - 1.1 cm  LVIDd:         3.0 - 5.6 cm  LVIDs:         1.8 - 4.0 cm  EF (Visual Estimate): 20-25 %  ------------------------------------------------------------------------    < end of copied text >  < from: Transthoracic Echocardiogram (18 @ 20:52) >  ------------------------------------------------------------------------  Conclusions:  Suboptimal study.  1. Normal left atrium.  2. Left ventricular ejection fraction, by visual  estimation, is 20-25%.  Multiple segmentalwall motion  abnormalities suggestive of CAD.  3. Normal right atrium.  4. Normal right ventricular size and function.  5. Normal pericardium with no pericardial effusion.  Findings discussed with cardiology fellow 10:45pm.  Definity will improve delineation of left ventricular  endocardial border to assess further wall motion  abnormality.  ------------------------------------------------------------------------    < end of copied text >    < from: Limited Transthoracic Echo (w/Cont) (18 @ 12:52) >  ------------------------------------------------------------------------  CONCLUSIONS:  Limited study for echo contrast and LV function.  1. Severe global left ventricular systolic dysfunction with  regional variation. The distal septum, anterior wall, and  apex are akinetic. Endocardial visualization enhanced with  intravenous injection of echo contrast (Definity). No LV  thrombus seen. Ejection fraction by visual estimation is  25%.  ------------------------------------------------------------------------    < end of copied text >    < from: Cardiac Cath Lab - Adult (18 @ 14:16) >  INTERVENTIONAL RECOMMENDATIONS: Diagnostic Peripheral Angiography Summary:  -renal artery ostium could not be identified despite considerable attempt  to isolate using previously performed CT imaging and multiple  catheters/wires.  -given risk to endograft and wish to avoid graft migration/endoleak,  procedure ended.  Recommendation:  -would recommend revascularization of coronary artery disease given new  cardiomyopathy/decompensated heart failure.  -continue dialysis as per renal.  -urology involvement regarding management of right renal mass.  -heme involvement for management of RAJEEV. Continue argatroban as component  of renal artery occlusive process could be related to thrombosis.  -discussed at length with daughter and patient.  -brachial sheath to be removed in CCU.    < end of copied text >    < from: Cardiac Cath Lab - Adult (18 @ 10:37) >  VENTRICLES: EF estimated was 35 %.  CORONARY VESSELS: The coronary circulation is right dominant.  LM:   --  LM: The vessel was large sized. Angiography showed minor luminal  irregularities with no flow limiting lesions.  --  Ostial LM: There was a 30 % stenosis.  LAD:   --  Proximal LAD: The vessel was medium to large sized. Angiography  showed moderate atherosclerosis.  --  Mid LAD: There was a 80 % stenosis.  --  Distal LAD: There was a 80 % stenosis.  --  D1: There was a 80 % stenosis.  CX:   --  OM1: There was a 70 % stenosis. Distal vessel angiography showed  a medium sized vessel and mild diffuse disease.  --  OM2: The vessel was medium sized. Angiography showed mild  atherosclerosis with no flow limiting lesions.  RCA:   --  Ostial RCA: There was a 30 % stenosis. Good reflux. No pressure  dampending.  --  Mid RCA: The vessel was large sized. Angiography showed mild  atherosclerosis with no flow limiting lesions.  --  Distal RCA: The vessel was large sized. Angiography showed minor  luminal irregularities with no flow limiting lesions.  --  RPDA: The vessel was large sized. Angiography showed mild  atherosclerosis with no flow limiting lesions.  --  RPLS: The vessel was large sized. Angiography showed minor luminal  irregularities with no flow limiting lesions.  ABDOMINAL VESSELS: Celiac: The vessel was large and patent. Superior  mesenteric: The vessel was large and patent. Left renal: This vessel was  not visualized. Right renal: There was a 99 % stenosis.  COMPLICATIONS: There were no complications.  DIAGNOSTIC RECOMMENDATIONS: Coronary Angiography Summary:  -LAD, Diagonal and Circumflex disease. At lease moderately reduced left  ventricular function (Segmental).  Coronary/Cardiac Plan:  -given acute illness, renal failure and new cardiomyopathy--percutaneous  revascularization likely preferred over surgicaloption. Case discussed  with Dr. Fofana, renal and critical care team. Plan to proceed with  percutaneous revascularization once stable.  Peripheral Angiography Summary:  -Right renal artery only faintly visualized. Unclear if dissection,  thrombosisor graft migration (unusual given the completion angiography at  time of EVAR).  Peripheral Plan:  -Will discuss with renal and vascular the value of CT angiography to try to  identify the renal artery ostium and the nature of the obstruction.  Potentially, cross sectional imaging will help identify the mechanism of  renal stenosis and method to recanalize.  -Discuss with urology whether the right kidney is salvagable (given the  mass).    < end of copied text >        Labs:                        8.4    10.7  )-----------( 92       ( 2018 04:06 )             25.0     02-16    134<L>  |  94<L>  |  61<H>  ----------------------------<  99  4.6   |  20<L>  |  6.21<H>    Ca    8.3<L>      2018 04:06  Phos  6.4     02-16  Mg     2.4     02-16    TPro  6.2  /  Alb  2.8<L>  /  TBili  0.4  /  DBili  x   /  AST  33  /  ALT  21  /  AlkPhos  58  02-16    PT/INR - ( 2018 04:06 )   PT: 14.2 sec;   INR: 1.30 ratio         PTT - ( 2018 04:06 )  PTT:40.2 sec Patient seen and evaluated @ ccu 9    HPI:  77 year old man with past medical history of AAA 5.5cm s/p EVAAR repair with stents on  with incidental finding of Right Renal Neoplasm, previous history of Left Renal Neoplasm with Left Nephrectomy in , Bladder Cancer, Known LBBB, HTN, HLD transferred from [facility] after initially presenting with SOB found DONNIE and HFrEF. Patient states that he was discharged from the hospital following AAA repair Wednesday. Initially recovering well, however,  he awoke from his sleep complaining of SOB.  He went to [facility].  Patient denies fever, chills, recent sick contact, Chest pain, Abdominal pain, diarrhea.  At [place], [facility] with elevate d-dimers and transferred for possible PE.  At [facility], patient with  RICKI in V2-V4 with elevated Trops, and ADHF requiring Bipap. At [place], paient with elevate d-dimers and transferred for possible PE.  At [facility], patient with  RICKI in V2-V4 with elevated Trops, and ADHF requiring Bipap.    PMH:   Renal mass, right  BPH (benign prostatic hyperplasia)  Abdominal aortic aneurysm (AAA)  Heel spur, left  Vertigo  Left bundle branch block  Hx antineoplastic chemotherapy (BCG )  Lt Renal Neoplasm  LBBB (Left Bundle Branch Block)  Hyperlipemia (ICD9 272.4)  Bladder Cancer (ICD9 188.9)  HTN    PSH:   H/O abdominal aortic aneurysm repair  History of cystoscopy  S/P cystoscopy  History of Lt  Nephrectomy  S/P Tonsillectomy  S/P Cystoscopy  S/P Cystoscopy  S/P Cystoscopy  Ureter Surgery  urethral Stent 2008  Status Post Cystoscopy (ICD9 V45.89)    Medications:   ALBUTerol/ipratropium for Nebulization 3 milliLiter(s) Nebulizer every 6 hours  aspirin enteric coated 81 milliGRAM(s) Oral daily  atorvastatin 80 milliGRAM(s) Oral at bedtime  diazepam    Tablet 10 milliGRAM(s) Oral once  docusate sodium 100 milliGRAM(s) Oral daily  hydrALAZINE 100 milliGRAM(s) Oral every 8 hours  isosorbide   dinitrate Tablet (ISORDIL) 10 milliGRAM(s) Oral three times a day  metoprolol succinate ER 50 milliGRAM(s) Oral daily  polyethylene glycol 3350 17 Gram(s) Oral daily PRN  senna 1 Tablet(s) Oral at bedtime  sevelamer hydrochloride 1600 milliGRAM(s) Oral three times a day with meals    Allergies:  Cipro (Other)  heparin (Other (Severe))  Levaquin (Other)  penicillin (Hives)    FAMILY HISTORY:  Family history of MI (myocardial infarction) (Father, Mother): father  74y/o MI, mother  94y/o alzheimers    Social History:  Smoking: former smoker 25 pack years quit 30 yrs ago  Alcohol: denies  Drugs: denies    Review of Systems: 10 point review of systems is otherwise negative except as mentioned above            Physical Exam:  T(C): 36.6 (18 @ 07:00), Max: 37 (02-15-18 @ 11:55)  HR: 92 (18 @ 09:00) (89 - 102)  BP: 133/75 (02-15-18 @ 20:00) (133/75 - 133/75)  RR: 18 (18 @ 09:00) (18 - 29)  SpO2: 99% (18 @ 09:00) (94% - 99%)    02-15 @ 07:01  -   @ 07:00  --------------------------------------------------------  IN: 1330.5 mL / OUT: 2200 mL / NET: -869.5 mL      Daily     Daily Weight in k.9 (2018 06:00)    Appearance: [x] Normal [ x] NAD  Eyes: [x ] EOMI  HENT: [x] Normal oral muscosa x]NC/AT  Cardiovascular: [x ] S1 [x ] S2 [ x] RRR lsb 2/6 systolic [x ]No edema , JVD not well assessed central line b/l IJ  Respiratory: [x ] Clear to auscultation bilaterally  Gastrointestinal: [ x] Soft [ x] Non-tender x[ ] Non-distended x[ ] BS+  Musculoskeletal: x[ ] No clubbingx ] No joint deformity   Neurologic: [x ] Non-focal  Psychiatry: [ x] AAOx3 [x ] Mood & affect appropriate  Skin: [x ] No rashes [x ] No ecchymoses x ] No cyanosis    Cardiovascular Diagnostic Testing:    < from: Transthoracic Echocardiogram (18 @ 20:52) >  ------------------------------------------------------------------------  Dimensions:    Normal Values:  LA:            2.0 - 4.0 cm  Ao:            2.0 - 3.8 cm  SEPTUM:        0.6 - 1.2 cm  PWT:           0.6 - 1.1 cm  LVIDd:         3.0 - 5.6 cm  LVIDs:         1.8 - 4.0 cm  EF (Visual Estimate): 20-25 %  ------------------------------------------------------------------------    < end of copied text >  < from: Transthoracic Echocardiogram (18 @ 20:52) >  ------------------------------------------------------------------------  Conclusions:  Suboptimal study.  1. Normal left atrium.  2. Left ventricular ejection fraction, by visual  estimation, is 20-25%.  Multiple segmentalwall motion  abnormalities suggestive of CAD.  3. Normal right atrium.  4. Normal right ventricular size and function.  5. Normal pericardium with no pericardial effusion.  Findings discussed with cardiology fellow 10:45pm.  Definity will improve delineation of left ventricular  endocardial border to assess further wall motion  abnormality.  ------------------------------------------------------------------------    < end of copied text >    < from: Limited Transthoracic Echo (w/Cont) (18 @ 12:52) >  ------------------------------------------------------------------------  CONCLUSIONS:  Limited study for echo contrast and LV function.  1. Severe global left ventricular systolic dysfunction with  regional variation. The distal septum, anterior wall, and  apex are akinetic. Endocardial visualization enhanced with  intravenous injection of echo contrast (Definity). No LV  thrombus seen. Ejection fraction by visual estimation is  25%.  ------------------------------------------------------------------------    < end of copied text >    < from: Cardiac Cath Lab - Adult (18 @ 14:16) >  INTERVENTIONAL RECOMMENDATIONS: Diagnostic Peripheral Angiography Summary:  -renal artery ostium could not be identified despite considerable attempt  to isolate using previously performed CT imaging and multiple  catheters/wires.  -given risk to endograft and wish to avoid graft migration/endoleak,  procedure ended.  Recommendation:  -would recommend revascularization of coronary artery disease given new  cardiomyopathy/decompensated heart failure.  -continue dialysis as per renal.  -urology involvement regarding management of right renal mass.  -heme involvement for management of RAJEEV. Continue argatroban as component  of renal artery occlusive process could be related to thrombosis.  -discussed at length with daughter and patient.  -brachial sheath to be removed in CCU.    < end of copied text >    < from: Cardiac Cath Lab - Adult (18 @ 10:37) >  VENTRICLES: EF estimated was 35 %.  CORONARY VESSELS: The coronary circulation is right dominant.  LM:   --  LM: The vessel was large sized. Angiography showed minor luminal  irregularities with no flow limiting lesions.  --  Ostial LM: There was a 30 % stenosis.  LAD:   --  Proximal LAD: The vessel was medium to large sized. Angiography  showed moderate atherosclerosis.  --  Mid LAD: There was a 80 % stenosis.  --  Distal LAD: There was a 80 % stenosis.  --  D1: There was a 80 % stenosis.  CX:   --  OM1: There was a 70 % stenosis. Distal vessel angiography showed  a medium sized vessel and mild diffuse disease.  --  OM2: The vessel was medium sized. Angiography showed mild  atherosclerosis with no flow limiting lesions.  RCA:   --  Ostial RCA: There was a 30 % stenosis. Good reflux. No pressure  dampending.  --  Mid RCA: The vessel was large sized. Angiography showed mild  atherosclerosis with no flow limiting lesions.  --  Distal RCA: The vessel was large sized. Angiography showed minor  luminal irregularities with no flow limiting lesions.  --  RPDA: The vessel was large sized. Angiography showed mild  atherosclerosis with no flow limiting lesions.  --  RPLS: The vessel was large sized. Angiography showed minor luminal  irregularities with no flow limiting lesions.  ABDOMINAL VESSELS: Celiac: The vessel was large and patent. Superior  mesenteric: The vessel was large and patent. Left renal: This vessel was  not visualized. Right renal: There was a 99 % stenosis.  COMPLICATIONS: There were no complications.  DIAGNOSTIC RECOMMENDATIONS: Coronary Angiography Summary:  -LAD, Diagonal and Circumflex disease. At lease moderately reduced left  ventricular function (Segmental).  Coronary/Cardiac Plan:  -given acute illness, renal failure and new cardiomyopathy--percutaneous  revascularization likely preferred over surgicaloption. Case discussed  with Dr. Fofana, renal and critical care team. Plan to proceed with  percutaneous revascularization once stable.  Peripheral Angiography Summary:  -Right renal artery only faintly visualized. Unclear if dissection,  thrombosisor graft migration (unusual given the completion angiography at  time of EVAR).  Peripheral Plan:  -Will discuss with renal and vascular the value of CT angiography to try to  identify the renal artery ostium and the nature of the obstruction.  Potentially, cross sectional imaging will help identify the mechanism of  renal stenosis and method to recanalize.  -Discuss with urology whether the right kidney is salvagable (given the  mass).    < end of copied text >    Labs:                        8.4    10.7  )-----------( 92       ( 2018 04:06 )             25.0     02-16    134<L>  |  94<L>  |  61<H>  ----------------------------<  99  4.6   |  20<L>  |  6.21<H>    Ca    8.3<L>      2018 04:06  Phos  6.4     02-  Mg     2.4         TPro  6.2  /  Alb  2.8<L>  /  TBili  0.4  /  DBili  x   /  AST  33  /  ALT  21  /  AlkPhos  58  02-16    PT/INR - ( 2018 04:06 )   PT: 14.2 sec;   INR: 1.30 ratio      PTT - ( 2018 04:06 )  PTT:40.2 sec

## 2018-02-16 NOTE — CONSULT NOTE ADULT - CONSULT REQUESTED DATE/TIME
04-Feb-2018 16:56
04-Feb-2018 18:30
08-Feb-2018
11-Feb-2018 15:28
16-Feb-2018 09:33
14-Feb-2018 13:54

## 2018-02-16 NOTE — CONSULT NOTE ADULT - ATTENDING COMMENTS
Patient seen and examined by me. 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 Argatroban should be continued until platelet count is 150.000 and then Coumadin should be started with an INR goal of 2-3.  Agree with assessment and plan of Dr Maxwell.
DONNIE on CKD leading to volume overload/HTN :  -Would attempt diuresis  -Check renal sono/Duplex  -If no improvement in renal function, he may need dialysis
Mr. Fuchs is currently stable, but with persistent pulmonary congestion in the setting of hypertension. His echocardiogram is suggestive of stress induced acute worsening of his LV systolic function, especially in his early echocardiograms when he was tachycardic. I would continue to reduce his afterload with addition of nitrates. I agree with pursuing revascularization.     Please call with additional questions at [phone].

## 2018-02-16 NOTE — CONSULT NOTE ADULT - PROBLEM SELECTOR RECOMMENDATION 2
-permacath for HD  -f/u urology and nephrology  -will discuss w interventional cardiology WOOD -agree with revascularization  -cont dapt, high intensity statin

## 2018-02-16 NOTE — PROVIDER CONTACT NOTE (OTHER) - RECOMMENDATIONS
hold dialysis; notify provider;
Continue to monitor.
consider keeping argatroban at same rate 2mcg/kg/min as PTT 55.6 drawn 2 hours after initally starting gtt, PTT to be checked in 4 hours per protocol

## 2018-02-17 DIAGNOSIS — E87.70 FLUID OVERLOAD, UNSPECIFIED: ICD-10-CM

## 2018-02-17 LAB
ALBUMIN SERPL ELPH-MCNC: 2.8 G/DL — LOW (ref 3.3–5)
ALP SERPL-CCNC: 60 U/L — SIGNIFICANT CHANGE UP (ref 40–120)
ALT FLD-CCNC: 25 U/L RC — SIGNIFICANT CHANGE UP (ref 10–45)
ANION GAP SERPL CALC-SCNC: 18 MMOL/L — HIGH (ref 5–17)
APTT BLD: 56.3 SEC — HIGH (ref 27.5–37.4)
AST SERPL-CCNC: 38 U/L — SIGNIFICANT CHANGE UP (ref 10–40)
BILIRUB SERPL-MCNC: 0.3 MG/DL — SIGNIFICANT CHANGE UP (ref 0.2–1.2)
BUN SERPL-MCNC: 43 MG/DL — HIGH (ref 7–23)
CALCIUM SERPL-MCNC: 8.3 MG/DL — LOW (ref 8.4–10.5)
CHLORIDE SERPL-SCNC: 95 MMOL/L — LOW (ref 96–108)
CO2 SERPL-SCNC: 24 MMOL/L — SIGNIFICANT CHANGE UP (ref 22–31)
CREAT SERPL-MCNC: 4.82 MG/DL — HIGH (ref 0.5–1.3)
GLUCOSE SERPL-MCNC: 98 MG/DL — SIGNIFICANT CHANGE UP (ref 70–99)
HCT VFR BLD CALC: 24 % — LOW (ref 39–50)
HGB BLD-MCNC: 8 G/DL — LOW (ref 13–17)
INR BLD: 1.94 RATIO — HIGH (ref 0.88–1.16)
MAGNESIUM SERPL-MCNC: 2.3 MG/DL — SIGNIFICANT CHANGE UP (ref 1.6–2.6)
MCHC RBC-ENTMCNC: 31.2 PG — SIGNIFICANT CHANGE UP (ref 27–34)
MCHC RBC-ENTMCNC: 33.4 GM/DL — SIGNIFICANT CHANGE UP (ref 32–36)
MCV RBC AUTO: 93.5 FL — SIGNIFICANT CHANGE UP (ref 80–100)
PHOSPHATE SERPL-MCNC: 5.5 MG/DL — HIGH (ref 2.5–4.5)
PLATELET # BLD AUTO: 130 K/UL — LOW (ref 150–400)
POTASSIUM SERPL-MCNC: 4.2 MMOL/L — SIGNIFICANT CHANGE UP (ref 3.5–5.3)
POTASSIUM SERPL-SCNC: 4.2 MMOL/L — SIGNIFICANT CHANGE UP (ref 3.5–5.3)
PROT SERPL-MCNC: 6.8 G/DL — SIGNIFICANT CHANGE UP (ref 6–8.3)
PROTHROM AB SERPL-ACNC: 21.4 SEC — HIGH (ref 9.8–12.7)
RBC # BLD: 2.56 M/UL — LOW (ref 4.2–5.8)
RBC # FLD: 12.2 % — SIGNIFICANT CHANGE UP (ref 10.3–14.5)
SODIUM SERPL-SCNC: 137 MMOL/L — SIGNIFICANT CHANGE UP (ref 135–145)
WBC # BLD: 11.9 K/UL — HIGH (ref 3.8–10.5)
WBC # FLD AUTO: 11.9 K/UL — HIGH (ref 3.8–10.5)

## 2018-02-17 PROCEDURE — 90935 HEMODIALYSIS ONE EVALUATION: CPT | Mod: GC

## 2018-02-17 PROCEDURE — 93010 ELECTROCARDIOGRAM REPORT: CPT

## 2018-02-17 PROCEDURE — 99233 SBSQ HOSP IP/OBS HIGH 50: CPT

## 2018-02-17 RX ORDER — DIAZEPAM 5 MG
5 TABLET ORAL ONCE
Qty: 0 | Refills: 0 | Status: DISCONTINUED | OUTPATIENT
Start: 2018-02-17 | End: 2018-02-17

## 2018-02-17 RX ORDER — ISOSORBIDE DINITRATE 5 MG/1
20 TABLET ORAL THREE TIMES A DAY
Qty: 0 | Refills: 0 | Status: DISCONTINUED | OUTPATIENT
Start: 2018-02-17 | End: 2018-02-21

## 2018-02-17 RX ADMIN — SEVELAMER CARBONATE 1600 MILLIGRAM(S): 2400 POWDER, FOR SUSPENSION ORAL at 19:35

## 2018-02-17 RX ADMIN — Medication 100 MILLIGRAM(S): at 19:36

## 2018-02-17 RX ADMIN — Medication 3 MILLILITER(S): at 13:00

## 2018-02-17 RX ADMIN — Medication 3 MILLILITER(S): at 18:19

## 2018-02-17 RX ADMIN — Medication 100 MILLIGRAM(S): at 05:55

## 2018-02-17 RX ADMIN — Medication 3 MILLILITER(S): at 00:30

## 2018-02-17 RX ADMIN — ISOSORBIDE DINITRATE 10 MILLIGRAM(S): 5 TABLET ORAL at 21:27

## 2018-02-17 RX ADMIN — ARGATROBAN 8.16 MICROGRAM(S)/KG/MIN: 50 INJECTION, SOLUTION INTRAVENOUS at 00:00

## 2018-02-17 RX ADMIN — ATORVASTATIN CALCIUM 80 MILLIGRAM(S): 80 TABLET, FILM COATED ORAL at 21:25

## 2018-02-17 RX ADMIN — Medication 50 MILLIGRAM(S): at 21:26

## 2018-02-17 RX ADMIN — Medication 81 MILLIGRAM(S): at 14:01

## 2018-02-17 RX ADMIN — Medication 3 MILLILITER(S): at 05:57

## 2018-02-17 RX ADMIN — ARGATROBAN 8.16 MICROGRAM(S)/KG/MIN: 50 INJECTION, SOLUTION INTRAVENOUS at 03:36

## 2018-02-17 RX ADMIN — Medication 5 MILLIGRAM(S): at 01:01

## 2018-02-17 RX ADMIN — ARGATROBAN 8.16 MICROGRAM(S)/KG/MIN: 50 INJECTION, SOLUTION INTRAVENOUS at 21:30

## 2018-02-17 RX ADMIN — Medication 3 MILLILITER(S): at 23:26

## 2018-02-17 RX ADMIN — ISOSORBIDE DINITRATE 10 MILLIGRAM(S): 5 TABLET ORAL at 05:56

## 2018-02-17 RX ADMIN — ISOSORBIDE DINITRATE 10 MILLIGRAM(S): 5 TABLET ORAL at 19:37

## 2018-02-17 RX ADMIN — SEVELAMER CARBONATE 1600 MILLIGRAM(S): 2400 POWDER, FOR SUSPENSION ORAL at 14:01

## 2018-02-17 RX ADMIN — Medication 50 MILLIGRAM(S): at 05:55

## 2018-02-17 NOTE — PROGRESS NOTE ADULT - PROBLEM SELECTOR PLAN 1
DONNIE on CKD in setting of significant renal artery occlusion  Baseline creatinine is between 1.2-1.3. Scr. on presentation was 6.48. Complements noted to be WNL.  Patient was initiated on CVVHDF on 2/5/18 and transitioned to IHD. Patient is currently dialysis dependent. s/p tunneled HD catheter placement. Schedule for PUF today.  Monitor BMP daily.

## 2018-02-17 NOTE — CHART NOTE - NSCHARTNOTEFT_GEN_A_CORE
====================  CCU MIDNIGHT ROUNDS  ====================    DEUCE BALL  [id number]    ====================  SUMMARY: HPI:  This is a 77 year old man with past medical history of AAA 5.5cm s/p EVAAR repair with stents on 1/29 with incidental finding of Right Renal Neoplasm, previous history of Left Renal Neoplasm with Left Nephrectomy in 2008, Bladder Cancer, Known LBBB, HTN, HLD transferred from [facility] after initially presenting with SOB which began last night. Patient states that he was discharged from the hospital following AAA repair and recovering well.  He was walking with walker at home with no complaints.  After going to bed, he was awoken from his sleep complaining of SOB with no relief.  He went to [facility] wtih his SOB getting progressively worse with 1 episode of vomitus before going to hospital.  Patient denies fever, chills, recent sick contact, Chest pain, Abdominal pain, diarrhea.  At [place], paient with elevate d-dimers and transferred for possible PE.  At [facility], patient with  RICKI in V2-V4 with elevated Trops, and ADHF requiring Bipap. (04 Feb 2018 17:51)    ====================        ====================  NEW EVENTS: s/p permacath today, s/p HD removal 2L with tachycardia and hypotension resolved with 2.5 IVP lopressor.   ====================        ====================  VITALS (Last 12 hrs):  ====================    T(C): 36.4 (02-16-18 @ 20:20), Max: 36.7 (02-16-18 @ 14:30)  HR: 104 (02-17-18 @ 00:30) (86 - 138)  ABP: 115/61 (02-17-18 @ 00:00) (88/50 - 159/67)  ABP(mean): 85 (02-17-18 @ 00:00) (62 - 104)  RR: 32 (02-17-18 @ 00:00) (16 - 32)  SpO2: 97% (02-17-18 @ 00:30) (91% - 100%)        TELEMETRY: sinus tach low 100s        I&O's Summary    15 Feb 2018 07:01  -  16 Feb 2018 07:00  --------------------------------------------------------  IN: 1340.5 mL / OUT: 2200 mL / NET: -859.5 mL    16 Feb 2018 07:01  -  17 Feb 2018 00:59  --------------------------------------------------------  IN: 589.2 mL / OUT: 2000 mL / NET: -1410.8 mL        ====================  PLAN:  1. ADHF  -severe tvd, planning for high risk PCI next week   -hydral/idordil for afterload reduction  -c/w BB  -holding ACE/spironolactone due to CKD  -DAPT, statin  2. 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

## 2018-02-17 NOTE — PROGRESS NOTE ADULT - SUBJECTIVE AND OBJECTIVE BOX
CCU2 NP    Patient is a 77y old  Male who presents with a chief complaint of Shortness of Breath (04 Feb 2018 17:51)      HPI:  This is a 77 year old man with past medical history of AAA 5.5cm s/p EVAAR repair with stents on 1/29 with incidental finding of Right Renal Neoplasm, previous history of Left Renal Neoplasm with Left Nephrectomy in 2008, Bladder Cancer, Known LBBB, HTN, HLD transferred from [facility] after initially presenting with SOB which began last night. Patient states that he was discharged from the hospital following AAA repair and recovering well.  He was walking with walker at home with no complaints.  After going to bed, he was awoken from his sleep complaining of SOB with no relief.  He went to [facility] wtih his SOB getting progressively worse with 1 episode of vomitus before going to hospital.  Patient denies fever, chills, recent sick contact, Chest pain, Abdominal pain, diarrhea.  At [place], paient with elevate d-dimers and transferred for possible PE.  At [facility], patient with  RICKI in V2-V4 with elevated Trops, and ADHF requiring Bipap. (04 Feb 2018 17:51)      PAST MEDICAL & SURGICAL HISTORY:  Renal mass, right: current - following with Dr Santiago  BPH (benign prostatic hyperplasia)  Abdominal aortic aneurysm (AAA): 5.5 cm  Heel spur, left  Vertigo  Left bundle branch block  Hx antineoplastic chemotherapy (BCG 2012)  Lt Renal Neoplasm: left - s/p left nephrectomy  LBBB (Left Bundle Branch Block): X 2 years  Hyperlipemia (ICD9 272.4): dx 2008  Bladder Cancer (ICD9 188.9): [facility], dx 1999  HTN: dx 2008  H/O abdominal aortic aneurysm repair  History of cystoscopy: April 2015  S/P cystoscopy: 05/2012  History of Lt  Nephrectomy: 6/10 - left  S/P Tonsillectomy  S/P Cystoscopy: bladder polyps removal multiple times (since 1999), 6/10 , 10/2011 &amp; 10/17/2011, 5/12, 11/2012, last 5/13/13, 12/2013, 7/2014  S/P Cystoscopy: with bladder polyps removal multiple times  S/P Cystoscopy: bladder bx  Ureter Surgery  urethral Stent 7/2008: stent placement and removal  Status Post Cystoscopy (ICD9 V45.89)      MEDICATIONS  (STANDING):  ALBUTerol/ipratropium for Nebulization 3 milliLiter(s) Nebulizer every 6 hours  argatroban Infusion 2 MICROgram(s)/kG/Min (8.16 mL/Hr) IV Continuous <Continuous>  aspirin enteric coated 81 milliGRAM(s) Oral daily  atorvastatin 80 milliGRAM(s) Oral at bedtime  docusate sodium 100 milliGRAM(s) Oral daily  hydrALAZINE 100 milliGRAM(s) Oral every 8 hours  isosorbide   dinitrate Tablet (ISORDIL) 10 milliGRAM(s) Oral three times a day  metoprolol succinate ER 50 milliGRAM(s) Oral daily  senna 1 Tablet(s) Oral at bedtime  sevelamer hydrochloride 1600 milliGRAM(s) Oral three times a day with meals    MEDICATIONS  (PRN):  polyethylene glycol 3350 17 Gram(s) Oral daily PRN Constipation      Allergies    Cipro (Other)  heparin (Other (Severe))  Levaquin (Other)  penicillin (Hives)    Intolerances        REVIEW OF SYSTEMS:  Negative unless otherwise stated    Vital Signs Last 24 Hrs  T(C): 36.6 (17 Feb 2018 15:37), Max: 37.1 (17 Feb 2018 05:00)  T(F): 97.8 (17 Feb 2018 15:37), Max: 98.8 (17 Feb 2018 05:00)  HR: 96 (17 Feb 2018 15:37) (89 - 138)  BP: --  BP(mean): --  RR: 18 (17 Feb 2018 15:37) (16 - 32)  SpO2: 99% (17 Feb 2018 15:37) (91% - 100%)    PHYSICAL EXAM: Patient feels "so-so"; undergoing dialysis at this time; looks fatigued  Neuro: A&Ox3  Lungs: CTA bilaterally  COR: S1S2 present, no murmurs/rubs/gallops noted  Extremities: CORTES; left IJ cordis in place--dsg. dry and intact; right subclavian permacath in place and accessed for dialysis. bilateral groin sutures in place  Pain: Denies pain    LABS:                        8.0    11.9  )-----------( 130      ( 17 Feb 2018 03:16 )             24.0     02-17    137  |  95<L>  |  43<H>  ----------------------------<  98  4.2   |  24  |  4.82<H>    Ca    8.3<L>      17 Feb 2018 03:16  Phos  5.5     02-17  Mg     2.3     02-17    TPro  6.8  /  Alb  2.8<L>  /  TBili  0.3  /  DBili  x   /  AST  38  /  ALT  25  /  AlkPhos  60  02-17    PT/INR - ( 17 Feb 2018 03:16 )   PT: 21.4 sec;   INR: 1.94 ratio         PTT - ( 17 Feb 2018 03:16 )  PTT:56.3 sec    CAPILLARY BLOOD GLUCOSE          RADIOLOGY & ADDITIONAL TESTS:    Assessment/Plan: 76 y/o male s/p EVAAR repair on 1/29/18, right kidney neoplasm noted, prior history of left nephrectomy for renal cell carcinoma in 2008, bladder CA, LBBB, Hypertension, hypercholesterolemia transferred from [facility] with Acute Diastolic Heart Failure, DONNIE on CKD now on hemodialysis, multifocal PNA, NSTEMI, rapid A-fib. 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 left heart cath on 2/8/18 revealed ostial LM 50%, LAD 80%, D1 80%, OM1 70%, ostial RCA 30%, and 99% stenosis of right renal artery.    Possibility of revascularization of coronaries with interventional cardiology;   Team discussing options

## 2018-02-17 NOTE — PROGRESS NOTE ADULT - PROBLEM SELECTOR PLAN 4
cont hydralazine and isordil - increase isordil to 20mg tid  cont metoprolol succinate at current dose.  ACEi and spironolactone on hold cont hydralazine and isordil - increased isordil to 20mg tid  cont metoprolol succinate at current dose.  ACEi and spironolactone on hold

## 2018-02-17 NOTE — PROGRESS NOTE ADULT - PROBLEM SELECTOR PLAN 3
Patient's volume status seems to be improved but still has evidence of pulmonary edema on examination. PUF today.

## 2018-02-17 NOTE — PROGRESS NOTE ADULT - PROBLEM SELECTOR PLAN 3
Patient's volume status seems to be improved but still has evdince of pulmonary edema on examination. Patient's volume status seems to be improved but still has evidence of pulmonary edema on examination. PUF today.

## 2018-02-17 NOTE — PROGRESS NOTE ADULT - SUBJECTIVE AND OBJECTIVE BOX
[facility] DIVISION OF KIDNEY DISEASES AND HYPERTENSION -- FOLLOW UP NOTE  --------------------------------------------------------------------------------  Chief Complaint: DONNIE    24 hour events/subjective:  s/p permacath yesterday   s/p HD removal 2L with tachycardia and hypotension resolved with 2.5 IVP lopressor.  downgraded from CCU      PAST HISTORY  --------------------------------------------------------------------------------  No significant changes to PMH, PSH, FHx, SHx, unless otherwise noted    ALLERGIES & MEDICATIONS  --------------------------------------------------------------------------------  Allergies    Cipro (Other)  heparin (Other (Severe))  Levaquin (Other)  penicillin (Hives)    Intolerances      Standing Inpatient Medications  ALBUTerol/ipratropium for Nebulization 3 milliLiter(s) Nebulizer every 6 hours  argatroban Infusion 2 MICROgram(s)/kG/Min IV Continuous <Continuous>  aspirin enteric coated 81 milliGRAM(s) Oral daily  atorvastatin 80 milliGRAM(s) Oral at bedtime  docusate sodium 100 milliGRAM(s) Oral daily  hydrALAZINE 100 milliGRAM(s) Oral every 8 hours  isosorbide   dinitrate Tablet (ISORDIL) 10 milliGRAM(s) Oral three times a day  metoprolol succinate ER 50 milliGRAM(s) Oral daily  senna 1 Tablet(s) Oral at bedtime  sevelamer hydrochloride 1600 milliGRAM(s) Oral three times a day with meals    PRN Inpatient Medications  polyethylene glycol 3350 17 Gram(s) Oral daily PRN      REVIEW OF SYSTEMS  --------------------------------------------------------------------------------  Gen: No weight changes, fatigue, fevers/chills, weakness  Skin: No rashes  Head/Eyes/Ears/Mouth: No headache; Normal hearing; Normal vision w/o blurriness; No sinus pain/discomfort, sore throat  Respiratory: No dyspnea, cough, wheezing, hemoptysis  CV: No chest pain, PND, orthopnea  GI: No abdominal pain, diarrhea, constipation, nausea, vomiting, melena, hematochezia  : No increased frequency, dysuria, hematuria, nocturia  MSK: No joint pain/swelling; no back pain; no edema  Neuro: No dizziness/lightheadedness, weakness, seizures, numbness, tingling  Heme: No easy bruising or bleeding  Endo: No heat/cold intolerance  Psych: No significant nervousness, anxiety, stress, depression    All other systems were reviewed and are negative, except as noted.    VITALS/PHYSICAL EXAM  --------------------------------------------------------------------------------  T(C): 37.1 (02-17-18 @ 05:00), Max: 37.1 (02-17-18 @ 05:00)  HR: 95 (02-17-18 @ 10:00) (86 - 138)  BP: --  RR: 22 (02-17-18 @ 10:00) (16 - 32)  SpO2: 96% (02-17-18 @ 10:00) (91% - 100%)  Wt(kg): --        02-16-18 @ 07:01  -  02-17-18 @ 07:00  --------------------------------------------------------  IN: 894.8 mL / OUT: 2000 mL / NET: -1105.2 mL    02-17-18 @ 07:01  -  02-17-18 @ 11:15  --------------------------------------------------------  IN: 16.4 mL / OUT: 0 mL / NET: 16.4 mL      Physical Exam:  	Gen: NAD, well-appearing  	HEENT: PERRL, supple neck, clear oropharynx  	Pulm: CTA B/L  	CV: RRR, S1S2; no rub  	Back: No spinal or CVA tenderness; no sacral edema  	Abd: +BS, soft, nontender/nondistended  	: No suprapubic tenderness  	UE: Warm, FROM, no clubbing, intact strength; no edema; no asterixis  	LE: Warm, FROM, no clubbing, intact strength; no edema  	Neuro: No focal deficits, intact gait  	Psych: Normal affect and mood  	Skin: Warm, without rashes  	Vascular access:    LABS/STUDIES  --------------------------------------------------------------------------------              8.0    11.9  >-----------<  130      [02-17-18 @ 03:16]              24.0     137  |  95  |  43  ----------------------------<  98      [02-17-18 @ 03:16]  4.2   |  24  |  4.82        Ca     8.3     [02-17-18 @ 03:16]      Mg     2.3     [02-17-18 @ 03:16]      Phos  5.5     [02-17-18 @ 03:16]    TPro  6.8  /  Alb  2.8  /  TBili  0.3  /  DBili  x   /  AST  38  /  ALT  25  /  AlkPhos  60  [02-17-18 @ 03:16]    PT/INR: PT 21.4 , INR 1.94       [02-17-18 @ 03:16]  PTT: 56.3       [02-17-18 @ 03:16]      Creatinine Trend:  SCr 4.82 [02-17 @ 03:16]  SCr 6.21 [02-16 @ 04:06]  SCr 5.76 [02-15 @ 04:47]  SCr 7.11 [02-14 @ 06:16]  SCr 9.22 [02-13 @ 19:12]    Urinalysis - [02-04-18 @ 13:17]      Color Yellow / Appearance Clear / SG 1.020 / pH 5.0      Gluc 50 / Ketone Negative  / Bili Negative / Urobili Negative       Blood Trace / Protein 30 / Leuk Est Trace / Nitrite Negative      RBC 0-2 / WBC 0-2 / Hyaline  / Gran  / Sq Epi  / Non Sq Epi Few / Bacteria Trace      HbA1c 5.4      [02-04-18 @ 21:37]  TSH 3.08      [02-04-18 @ 21:37]  Lipid: chol 145, , HDL 26, LDL 87      [02-04-18 @ 21:37]    HBsAb <3.0      [02-07-18 @ 23:02]  HBsAg Nonreact      [02-07-18 @ 23:02]  HCV 0.11, Nonreact      [02-07-18 @ 23:02]    C3 Complement 157      [02-05-18 @ 21:15]  C4 Complement 35      [02-05-18 @ 21:15] [facility] DIVISION OF KIDNEY DISEASES AND HYPERTENSION -- FOLLOW UP NOTE  --------------------------------------------------------------------------------  Chief Complaint: DONNIE    24 hour events/subjective:  s/p permacath yesterday   s/p HD with 2 L UF  downgraded from CCU      PAST HISTORY  --------------------------------------------------------------------------------  No significant changes to PMH, PSH, FHx, SHx, unless otherwise noted    ALLERGIES & MEDICATIONS  --------------------------------------------------------------------------------  Allergies    Cipro (Other)  heparin (Other (Severe))  Levaquin (Other)  penicillin (Hives)    Intolerances      Standing Inpatient Medications  ALBUTerol/ipratropium for Nebulization 3 milliLiter(s) Nebulizer every 6 hours  argatroban Infusion 2 MICROgram(s)/kG/Min IV Continuous <Continuous>  aspirin enteric coated 81 milliGRAM(s) Oral daily  atorvastatin 80 milliGRAM(s) Oral at bedtime  docusate sodium 100 milliGRAM(s) Oral daily  hydrALAZINE 100 milliGRAM(s) Oral every 8 hours  isosorbide   dinitrate Tablet (ISORDIL) 10 milliGRAM(s) Oral three times a day  metoprolol succinate ER 50 milliGRAM(s) Oral daily  senna 1 Tablet(s) Oral at bedtime  sevelamer hydrochloride 1600 milliGRAM(s) Oral three times a day with meals    PRN Inpatient Medications  polyethylene glycol 3350 17 Gram(s) Oral daily PRN      REVIEW OF SYSTEMS  --------------------------------------------------------------------------------  Gen: No weight changes, fatigue, fevers/chills, weakness  Skin: No rashes  Head/Eyes/Ears/Mouth: No headache; Normal hearing; Normal vision w/o blurriness; No sinus pain/discomfort, sore throat  Respiratory: No dyspnea, cough, wheezing, hemoptysis  CV: No chest pain, PND, orthopnea  GI: No abdominal pain, diarrhea, constipation, nausea, vomiting, melena, hematochezia  : No increased frequency, dysuria, hematuria, nocturia  MSK: No joint pain/swelling; no back pain; no edema  Neuro: No dizziness/lightheadedness, weakness, seizures, numbness, tingling  Heme: No easy bruising or bleeding  Endo: No heat/cold intolerance  Psych: No significant nervousness, anxiety, stress, depression    All other systems were reviewed and are negative, except as noted.    VITALS/PHYSICAL EXAM  --------------------------------------------------------------------------------  T(C): 37.1 (02-17-18 @ 05:00), Max: 37.1 (02-17-18 @ 05:00)  HR: 95 (02-17-18 @ 10:00) (86 - 138)  BP: --  RR: 22 (02-17-18 @ 10:00) (16 - 32)  SpO2: 96% (02-17-18 @ 10:00) (91% - 100%)  Wt(kg): --        02-16-18 @ 07:01  -  02-17-18 @ 07:00  --------------------------------------------------------  IN: 894.8 mL / OUT: 2000 mL / NET: -1105.2 mL    02-17-18 @ 07:01  -  02-17-18 @ 11:15  --------------------------------------------------------  IN: 16.4 mL / OUT: 0 mL / NET: 16.4 mL      Physical Exam:  	Gen: NAD  	Pulm: b/l crackles  	CV: RRR, S1S2; no rub  	Abd: +BS, soft, nontender/nondistended  	: No suprapubic tenderness  	UE: Warm,  no edema; no asterixis  	LE: Warm,  no edema  	Skin: Warm,  	Vascular access: RIJ tunneled cath    LABS/STUDIES  --------------------------------------------------------------------------------              8.0    11.9  >-----------<  130      [02-17-18 @ 03:16]              24.0     137  |  95  |  43  ----------------------------<  98      [02-17-18 @ 03:16]  4.2   |  24  |  4.82        Ca     8.3     [02-17-18 @ 03:16]      Mg     2.3     [02-17-18 @ 03:16]      Phos  5.5     [02-17-18 @ 03:16]    TPro  6.8  /  Alb  2.8  /  TBili  0.3  /  DBili  x   /  AST  38  /  ALT  25  /  AlkPhos  60  [02-17-18 @ 03:16]    PT/INR: PT 21.4 , INR 1.94       [02-17-18 @ 03:16]  PTT: 56.3       [02-17-18 @ 03:16]      Creatinine Trend:  SCr 4.82 [02-17 @ 03:16]  SCr 6.21 [02-16 @ 04:06]  SCr 5.76 [02-15 @ 04:47]  SCr 7.11 [02-14 @ 06:16]  SCr 9.22 [02-13 @ 19:12]    Urinalysis - [02-04-18 @ 13:17]      Color Yellow / Appearance Clear / SG 1.020 / pH 5.0      Gluc 50 / Ketone Negative  / Bili Negative / Urobili Negative       Blood Trace / Protein 30 / Leuk Est Trace / Nitrite Negative      RBC 0-2 / WBC 0-2 / Hyaline  / Gran  / Sq Epi  / Non Sq Epi Few / Bacteria Trace      HbA1c 5.4      [02-04-18 @ 21:37]  TSH 3.08      [02-04-18 @ 21:37]  Lipid: chol 145, , HDL 26, LDL 87      [02-04-18 @ 21:37]    HBsAb <3.0      [02-07-18 @ 23:02]  HBsAg Nonreact      [02-07-18 @ 23:02]  HCV 0.11, Nonreact      [02-07-18 @ 23:02]    C3 Complement 157      [02-05-18 @ 21:15]  C4 Complement 35      [02-05-18 @ 21:15]

## 2018-02-17 NOTE — PROGRESS NOTE ADULT - SUBJECTIVE AND OBJECTIVE BOX
Post-Anesthetic Evaluation:    The Patient was interviewed and evaluated    Vital Signs Last 24 Hrs  T(C): 37.1 (17 Feb 2018 12:00), Max: 37.1 (17 Feb 2018 05:00)  T(F): 98.7 (17 Feb 2018 12:00), Max: 98.8 (17 Feb 2018 05:00)  HR: 94 (17 Feb 2018 15:00) (86 - 138)  BP: 126/47  BP(mean): --  RR: 20 (17 Feb 2018 15:00) (16 - 32)  SpO2: 97% (17 Feb 2018 14:10) (91% - 100%)    Evaluation:      (X) No apparent complications or complaints regarding anesthesia care at this time  (X) All questions were answered    Condition:  (X) Stable      ( ) Guarded      ( ) Critical    Recommendations:  (X) None     ( ) Other:

## 2018-02-17 NOTE — PROGRESS NOTE ADULT - SUBJECTIVE AND OBJECTIVE BOX
CCU MIDNIGHT ROUNDS    DEUCE BALL  [id number]  77y  Male    SUMMARY:    HPI:  This is a 77 year old man with past medical history of AAA 5.5cm s/p EVAAR repair with stents on 1/29 with incidental finding of Right Renal Neoplasm, previous history of Left Renal Neoplasm with Left Nephrectomy in 2008, Bladder Cancer, Known LBBB, HTN, HLD transferred from [facility] after initially presenting with SOB which began last night. Patient states that he was discharged from the hospital following AAA repair and recovering well.  He was walking with walker at home with no complaints.  After going to bed, he was awoken from his sleep complaining of SOB with no relief.  He went to [facility] wtih his SOB getting progressively worse with 1 episode of vomitus before going to hospital.  Patient denies fever, chills, recent sick contact, Chest pain, Abdominal pain, diarrhea.  At [place], paient with elevate d-dimers and transferred for possible PE.  At [facility], patient with  RICKI in V2-V4 with elevated Trops, and ADHF requiring Bipap. (04 Feb 2018 17:51)      Past Medical History:     Past Surgical History:     NEW EVENTS:      UPDATED VITALS:    ICU Vital Signs Last 24 Hrs  T(C): 36.7 (17 Feb 2018 20:00), Max: 37.1 (17 Feb 2018 05:00)  T(F): 98 (17 Feb 2018 20:00), Max: 98.8 (17 Feb 2018 05:00)  HR: 94 (17 Feb 2018 22:00) (85 - 108)  BP: --  BP(mean): --  ABP: 127/62 (17 Feb 2018 22:00) (102/47 - 166/77)  ABP(mean): 84 (17 Feb 2018 22:00) (60 - 110)  RR: 17 (17 Feb 2018 22:00) (17 - 32)  SpO2: 95% (17 Feb 2018 22:00) (92% - 100%)      I&O's Summary    16 Feb 2018 07:01  -  17 Feb 2018 07:00  --------------------------------------------------------  IN: 894.8 mL / OUT: 2000 mL / NET: -1105.2 mL    17 Feb 2018 07:01  -  17 Feb 2018 22:12  --------------------------------------------------------  IN: 1271.2 mL / OUT: 2200 mL / NET: -928.8 mL        NEW LABS:                          8.0    11.9  )-----------( 130      ( 17 Feb 2018 03:16 )             24.0     02-17    137  |  95<L>  |  43<H>  ----------------------------<  98  4.2   |  24  |  4.82<H>    Ca    8.3<L>      17 Feb 2018 03:16  Phos  5.5     02-17  Mg     2.3     02-17    TPro  6.8  /  Alb  2.8<L>  /  TBili  0.3  /  DBili  x   /  AST  38  /  ALT  25  /  AlkPhos  60  02-17    PT/INR - ( 17 Feb 2018 03:16 )   PT: 21.4 sec;   INR: 1.94 ratio         PTT - ( 17 Feb 2018 03:16 )  PTT:56.3 sec      ABG - ( 16 Feb 2018 04:02 )  pH: 7.41  /  pCO2: 36    /  pO2: 92    / HCO3: 23    / Base Excess: -1.2  /  SaO2: 98                  PHYSICAL EXAM:   General: NAD, well groomed, well- developed   HEAD: Atraumatic, normocephalic   EYES: PERRLA, EOMI  ENMT: moist mucous membranes, good dentition  NECK: Supple, No JVD  NERVOUS SYSTEM: AxOX3 motor strength 5/5 B/L upper and lower extremities  CHEST/LUNG: clear to auscultation bilaterally; no rales, rhonchi, wheezing, or rubs  HEART: Regular rate and rhythm no murmurs, rubs, or gallops   ABDOMEN: soft non-distended, non-tender +BS  EXTREMITIES: +2 peripheral pulses no clubbing, no cyanosis or edema   SKIN: No rashes or lesions         Terri Morantus DNP, ANP-c Beeper # [id number] [employer] [facility] #[id number] CCU MIDNIGHT ROUNDS 02/17/18-02/18/18    DEUCE BALL  [id number]  77y  Male    SUMMARY:    HPI:  This is a 77 year old man with past medical history of AAA 5.5cm s/p EVAAR repair with stents on 1/29 with incidental finding of Right Renal Neoplasm, previous history of Left Renal Neoplasm with Left Nephrectomy in 2008, Bladder Cancer, Known LBBB, HTN, HLD transferred from [facility] after initially presenting with SOB which began last night. Patient states that he was discharged from the hospital following AAA repair and recovering well.  He was walking with walker at home with no complaints.  After going to bed, he was awoken from his sleep complaining of SOB with no relief.  He went to [facility] wtih his SOB getting progressively worse with 1 episode of vomitus before going to hospital.  Patient denies fever, chills, recent sick contact, Chest pain, Abdominal pain, diarrhea.  At [place], paient with elevate d-dimers and transferred for possible PE.  At [facility], patient with  RICKI in V2-V4 with elevated Trops, and ADHF requiring Bipap. (04 Feb 2018 17:51)    Overnight Events:       UPDATED VITALS:    ICU Vital Signs Last 24 Hrs  T(C): 36.7 (17 Feb 2018 20:00), Max: 37.1 (17 Feb 2018 05:00)  T(F): 98 (17 Feb 2018 20:00), Max: 98.8 (17 Feb 2018 05:00)  HR: 94 (17 Feb 2018 22:00) (85 - 108)  BP: --  BP(mean): --  ABP: 127/62 (17 Feb 2018 22:00) (102/47 - 166/77)  ABP(mean): 84 (17 Feb 2018 22:00) (60 - 110)  RR: 17 (17 Feb 2018 22:00) (17 - 32)  SpO2: 95% (17 Feb 2018 22:00) (92% - 100%)      I&O's Summary    16 Feb 2018 07:01  -  17 Feb 2018 07:00  --------------------------------------------------------  IN: 894.8 mL / OUT: 2000 mL / NET: -1105.2 mL    17 Feb 2018 07:01  -  17 Feb 2018 22:12  --------------------------------------------------------  IN: 1271.2 mL / OUT: 2200 mL / NET: -928.8 mL        NEW LABS:                          8.0    11.9  )-----------( 130      ( 17 Feb 2018 03:16 )             24.0     02-17    137  |  95<L>  |  43<H>  ----------------------------<  98  4.2   |  24  |  4.82<H>    Ca    8.3<L>      17 Feb 2018 03:16  Phos  5.5     02-17  Mg     2.3     02-17    TPro  6.8  /  Alb  2.8<L>  /  TBili  0.3  /  DBili  x   /  AST  38  /  ALT  25  /  AlkPhos  60  02-17    PT/INR - ( 17 Feb 2018 03:16 )   PT: 21.4 sec;   INR: 1.94 ratio         PTT - ( 17 Feb 2018 03:16 )  PTT:56.3 sec      ABG - ( 16 Feb 2018 04:02 )  pH: 7.41  /  pCO2: 36    /  pO2: 92    / HCO3: 23    / Base Excess: -1.2  /  SaO2: 98            PHYSICAL EXAM:   General: NAD, well groomed, well- developed   HEAD: Atraumatic, normocephalic   EYES: PERRLA, EOMI  NECK: Supple, No JVD Rt. IJ perma cath, Lt IJ cordis  NERVOUS SYSTEM: AxOX3 motor strength 5/5 B/L upper and lower extremities  CHEST/LUNG: +rhonchi, HEART: Regular rate and rhythm no murmurs, rubs, or gallops   ABDOMEN: soft non-distended, non-tender +BS  B/L groins staples noted slight redness noted   EXTREMITIES: +2 peripheral pulses no clubbing, no cyanosis or edema, left radial A-line   SKIN: No rashes or lesions         Terri Guthrie DNP, ANP-c Beeper # [id number] [employer] [facility] #[id number] CCU MIDNIGHT ROUNDS 02/17/18-02/18/18    DEUCE BALL  [id number]  77y  Male    SUMMARY:    HPI:  This is a 77 year old man with past medical history of AAA 5.5cm s/p EVAAR repair with stents on 1/29 with incidental finding of Right Renal Neoplasm, previous history of Left Renal Neoplasm with Left Nephrectomy in 2008, Bladder Cancer, Known LBBB, HTN, HLD transferred from [facility] after initially presenting with SOB which began last night. Patient states that he was discharged from the hospital following AAA repair and recovering well.  He was walking with walker at home with no complaints.  After going to bed, he was awoken from his sleep complaining of SOB with no relief.  He went to [facility] wtih his SOB getting progressively worse with 1 episode of vomitus before going to hospital.  Patient denies fever, chills, recent sick contact, Chest pain, Abdominal pain, diarrhea.  At [place], paient with elevate d-dimers and transferred for possible PE.  At [facility], patient with  RICKI in V2-V4 with elevated Trops, and ADHF requiring Bipap. (04 Feb 2018 17:51)    Overnight Events: No events noted overnight on telemetry       UPDATED VITALS:    ICU Vital Signs Last 24 Hrs  T(C): 36.7 (17 Feb 2018 20:00), Max: 37.1 (17 Feb 2018 05:00)  T(F): 98 (17 Feb 2018 20:00), Max: 98.8 (17 Feb 2018 05:00)  HR: 94 (17 Feb 2018 22:00) (85 - 108)  BP: --  BP(mean): --  ABP: 127/62 (17 Feb 2018 22:00) (102/47 - 166/77)  ABP(mean): 84 (17 Feb 2018 22:00) (60 - 110)  RR: 17 (17 Feb 2018 22:00) (17 - 32)  SpO2: 95% (17 Feb 2018 22:00) (92% - 100%)      I&O's Summary    16 Feb 2018 07:01  -  17 Feb 2018 07:00  --------------------------------------------------------  IN: 894.8 mL / OUT: 2000 mL / NET: -1105.2 mL    17 Feb 2018 07:01  -  17 Feb 2018 22:12  --------------------------------------------------------  IN: 1271.2 mL / OUT: 2200 mL / NET: -928.8 mL        NEW LABS:           PHYSICAL EXAM:   General: NAD, well groomed, well- developed   HEAD: Atraumatic, normocephalic   EYES: PERRLA, EOMI  NECK: Supple, No JVD Rt. IJ perma cath, Lt IJ cordis  NERVOUS SYSTEM: AxOX3 motor strength 5/5 B/L upper and lower extremities  CHEST/LUNG: +rhonchi, HEART: Regular rate and rhythm no murmurs, rubs, or gallops   ABDOMEN: soft non-distended, non-tender +BS  B/L groins staples noted slight redness noted   EXTREMITIES: +2 peripheral pulses no clubbing, no cyanosis or edema, left radial A-line   SKIN: No rashes or lesions         Terri Guthrie DNP, ANP-c Beeper # [id number] [employer] [facility] #[id number]

## 2018-02-17 NOTE — PROGRESS NOTE ADULT - PROBLEM SELECTOR PLAN 1
Patient with DONNIE on CKD in setting of significant renal artery occlusion: Baseline creatinine is between 1.2-1.3. Scr. on presentation was 6.48. Complements noted to be WNL.  Patient was initiated on CVVHDF on 2/5/18 and transitioned to IHD. Patient is currently dialysis dependent. s/p tunneled HD catheter placement. Schedule for PUF today.  Monitor BMP daily.

## 2018-02-17 NOTE — PROGRESS NOTE ADULT - PROBLEM SELECTOR PLAN 1
Patient with DONNIE on CKD in setting of significant renal artery occlusion: Baseline creatinine is between 1.2-1.3. Scr. on presentation was 6.48. Complements noted to be WNL.  Patient was initiated on CVVHDF on 2/5/18 and transitioned to IHD. Patient is currently dialysis dependent. s/p tunneled HD catheter placement. Monitor BMP daily. Patient with DONNIE on CKD in setting of significant renal artery occlusion: Baseline creatinine is between 1.2-1.3. Scr. on presentation was 6.48. Complements noted to be WNL.  Patient was initiated on CVVHDF on 2/5/18 and transitioned to IHD. Patient is currently dialysis dependent. s/p tunneled HD catheter placement. Schedule for PUF today.  Monitor BMP daily.

## 2018-02-17 NOTE — PROGRESS NOTE ADULT - ASSESSMENT
76 y/o male s/p EVAAR repair on 1/29/18, right kidney neoplasm noted, prior history of left nephrectomy for renal cell carcinoma in 2008, bladder CA, LBBB, Hypertension, hypercholesterolemia transferred from [facility] with Acute Diastolic Heart Failure, DONNIE on CKD now on hemodialysis, multifocal PNA, NSTEMI, rapid A-fib. 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 Left heart cath on 2/8/18 revealed ostial LM 50%, LAD 80%, D1 80%, OM1 70%, ostial RCA 30%, and 99% stenosis of right renal artery.    Possibility of revascularization of coronaries with interventional cardiology when optimized.    Team discussing options

## 2018-02-18 DIAGNOSIS — I71.4 ABDOMINAL AORTIC ANEURYSM, WITHOUT RUPTURE: ICD-10-CM

## 2018-02-18 LAB
ALBUMIN SERPL ELPH-MCNC: 2.7 G/DL — LOW (ref 3.3–5)
ALP SERPL-CCNC: 57 U/L — SIGNIFICANT CHANGE UP (ref 40–120)
ALT FLD-CCNC: 24 U/L RC — SIGNIFICANT CHANGE UP (ref 10–45)
ANION GAP SERPL CALC-SCNC: 21 MMOL/L — HIGH (ref 5–17)
APTT BLD: 55.2 SEC — HIGH (ref 27.5–37.4)
AST SERPL-CCNC: 40 U/L — SIGNIFICANT CHANGE UP (ref 10–40)
BILIRUB SERPL-MCNC: 0.4 MG/DL — SIGNIFICANT CHANGE UP (ref 0.2–1.2)
BLD GP AB SCN SERPL QL: NEGATIVE — SIGNIFICANT CHANGE UP
BUN SERPL-MCNC: 78 MG/DL — HIGH (ref 7–23)
CALCIUM SERPL-MCNC: 8.3 MG/DL — LOW (ref 8.4–10.5)
CHLORIDE SERPL-SCNC: 91 MMOL/L — LOW (ref 96–108)
CO2 SERPL-SCNC: 21 MMOL/L — LOW (ref 22–31)
CREAT SERPL-MCNC: 6.83 MG/DL — HIGH (ref 0.5–1.3)
GLUCOSE SERPL-MCNC: 102 MG/DL — HIGH (ref 70–99)
HCT VFR BLD CALC: 22.2 % — LOW (ref 39–50)
HGB BLD-MCNC: 7.8 G/DL — LOW (ref 13–17)
INR BLD: 1.87 RATIO — HIGH (ref 0.88–1.16)
MAGNESIUM SERPL-MCNC: 2.6 MG/DL — SIGNIFICANT CHANGE UP (ref 1.6–2.6)
MCHC RBC-ENTMCNC: 33 PG — SIGNIFICANT CHANGE UP (ref 27–34)
MCHC RBC-ENTMCNC: 35.3 GM/DL — SIGNIFICANT CHANGE UP (ref 32–36)
MCV RBC AUTO: 93.5 FL — SIGNIFICANT CHANGE UP (ref 80–100)
PHOSPHATE SERPL-MCNC: 6.7 MG/DL — HIGH (ref 2.5–4.5)
PLATELET # BLD AUTO: 174 K/UL — SIGNIFICANT CHANGE UP (ref 150–400)
POTASSIUM SERPL-MCNC: 4.4 MMOL/L — SIGNIFICANT CHANGE UP (ref 3.5–5.3)
POTASSIUM SERPL-SCNC: 4.4 MMOL/L — SIGNIFICANT CHANGE UP (ref 3.5–5.3)
PROT SERPL-MCNC: 6.4 G/DL — SIGNIFICANT CHANGE UP (ref 6–8.3)
PROTHROM AB SERPL-ACNC: 20.4 SEC — HIGH (ref 9.8–12.7)
RBC # BLD: 2.37 M/UL — LOW (ref 4.2–5.8)
RBC # FLD: 12.3 % — SIGNIFICANT CHANGE UP (ref 10.3–14.5)
RH IG SCN BLD-IMP: POSITIVE — SIGNIFICANT CHANGE UP
SODIUM SERPL-SCNC: 133 MMOL/L — LOW (ref 135–145)
WBC # BLD: 10 K/UL — SIGNIFICANT CHANGE UP (ref 3.8–10.5)
WBC # FLD AUTO: 10 K/UL — SIGNIFICANT CHANGE UP (ref 3.8–10.5)

## 2018-02-18 PROCEDURE — 99233 SBSQ HOSP IP/OBS HIGH 50: CPT

## 2018-02-18 PROCEDURE — 92928 PRQ TCAT PLMT NTRAC ST 1 LES: CPT | Mod: LC

## 2018-02-18 PROCEDURE — 93306 TTE W/DOPPLER COMPLETE: CPT | Mod: 26

## 2018-02-18 PROCEDURE — 99152 MOD SED SAME PHYS/QHP 5/>YRS: CPT

## 2018-02-18 PROCEDURE — 93010 ELECTROCARDIOGRAM REPORT: CPT

## 2018-02-18 PROCEDURE — 93454 CORONARY ARTERY ANGIO S&I: CPT | Mod: 26,59

## 2018-02-18 RX ORDER — CLOPIDOGREL BISULFATE 75 MG/1
75 TABLET, FILM COATED ORAL DAILY
Qty: 0 | Refills: 0 | Status: DISCONTINUED | OUTPATIENT
Start: 2018-02-19 | End: 2018-02-24

## 2018-02-18 RX ORDER — CLOPIDOGREL BISULFATE 75 MG/1
600 TABLET, FILM COATED ORAL ONCE
Qty: 0 | Refills: 0 | Status: COMPLETED | OUTPATIENT
Start: 2018-02-18 | End: 2018-02-18

## 2018-02-18 RX ORDER — ARGATROBAN 50 MG/50ML
2 INJECTION, SOLUTION INTRAVENOUS
Qty: 250 | Refills: 0 | Status: DISCONTINUED | OUTPATIENT
Start: 2018-02-18 | End: 2018-02-21

## 2018-02-18 RX ORDER — SIMETHICONE 80 MG/1
80 TABLET, CHEWABLE ORAL ONCE
Qty: 0 | Refills: 0 | Status: COMPLETED | OUTPATIENT
Start: 2018-02-18 | End: 2018-02-18

## 2018-02-18 RX ORDER — WARFARIN SODIUM 2.5 MG/1
2 TABLET ORAL ONCE
Qty: 0 | Refills: 0 | Status: COMPLETED | OUTPATIENT
Start: 2018-02-18 | End: 2018-02-18

## 2018-02-18 RX ADMIN — Medication 3 MILLILITER(S): at 17:23

## 2018-02-18 RX ADMIN — ISOSORBIDE DINITRATE 20 MILLIGRAM(S): 5 TABLET ORAL at 21:42

## 2018-02-18 RX ADMIN — Medication 3 MILLILITER(S): at 13:16

## 2018-02-18 RX ADMIN — ISOSORBIDE DINITRATE 20 MILLIGRAM(S): 5 TABLET ORAL at 13:17

## 2018-02-18 RX ADMIN — CLOPIDOGREL BISULFATE 600 MILLIGRAM(S): 75 TABLET, FILM COATED ORAL at 09:12

## 2018-02-18 RX ADMIN — SEVELAMER CARBONATE 1600 MILLIGRAM(S): 2400 POWDER, FOR SUSPENSION ORAL at 12:15

## 2018-02-18 RX ADMIN — SEVELAMER CARBONATE 1600 MILLIGRAM(S): 2400 POWDER, FOR SUSPENSION ORAL at 08:26

## 2018-02-18 RX ADMIN — Medication 3 MILLILITER(S): at 05:40

## 2018-02-18 RX ADMIN — ATORVASTATIN CALCIUM 80 MILLIGRAM(S): 80 TABLET, FILM COATED ORAL at 21:42

## 2018-02-18 RX ADMIN — Medication 81 MILLIGRAM(S): at 12:16

## 2018-02-18 RX ADMIN — ARGATROBAN 8.16 MICROGRAM(S)/KG/MIN: 50 INJECTION, SOLUTION INTRAVENOUS at 22:31

## 2018-02-18 RX ADMIN — ARGATROBAN 8.16 MICROGRAM(S)/KG/MIN: 50 INJECTION, SOLUTION INTRAVENOUS at 05:09

## 2018-02-18 RX ADMIN — SEVELAMER CARBONATE 1600 MILLIGRAM(S): 2400 POWDER, FOR SUSPENSION ORAL at 17:21

## 2018-02-18 RX ADMIN — Medication 100 MILLIGRAM(S): at 13:17

## 2018-02-18 RX ADMIN — Medication 100 MILLIGRAM(S): at 05:04

## 2018-02-18 RX ADMIN — Medication 100 MILLIGRAM(S): at 21:42

## 2018-02-18 RX ADMIN — Medication 3 MILLILITER(S): at 23:44

## 2018-02-18 RX ADMIN — SIMETHICONE 80 MILLIGRAM(S): 80 TABLET, CHEWABLE ORAL at 19:19

## 2018-02-18 RX ADMIN — ISOSORBIDE DINITRATE 20 MILLIGRAM(S): 5 TABLET ORAL at 05:04

## 2018-02-18 RX ADMIN — WARFARIN SODIUM 2 MILLIGRAM(S): 2.5 TABLET ORAL at 22:30

## 2018-02-18 RX ADMIN — Medication 50 MILLIGRAM(S): at 05:04

## 2018-02-18 NOTE — PROGRESS NOTE ADULT - SUBJECTIVE AND OBJECTIVE BOX
Patient is a 77y old  Male who presents with a chief complaint of Shortness of Breath (04 Feb 2018 17:51)    HPI:  This is a 77 year old man with past medical history of AAA 5.5cm s/p EVAAR repair with stents on 1/29 with incidental finding of Right Renal Neoplasm, previous history of Left Renal Neoplasm with Left Nephrectomy in 2008, Bladder Cancer, Known LBBB, HTN, HLD transferred from [facility] after initially presenting with SOB which began last night. Patient states that he was discharged from the hospital following AAA repair and recovering well.  He was walking with walker at home with no complaints.  After going to bed, he was awoken from his sleep complaining of SOB with no relief.  He went to [facility] wtih his SOB getting progressively worse with 1 episode of vomitus before going to hospital.  Patient denies fever, chills, recent sick contact, Chest pain, Abdominal pain, diarrhea.  At [place], paient with elevate d-dimers and transferred for possible PE.  At [facility], patient with  RICKI in V2-V4 with elevated Trops, and ADHF requiring Bipap. (04 Feb 2018 17:51)    Overnight Event: Pt in bed without compliants    REVIEW OF SYSTEMS:  CONSTITUTIONAL: No weakness, fevers or chills  Eyes/ENT: No visual changes: No vertigo or throat pain  NECK: No pain or stiffness  Resp: No cough, wheezing, hemoptysis; No shortness of breath  Cardiovascular: No chest pain or palpitations  GI: No abdominal or epigastric pain. NO nausea, vomiting, or hematemesis: No diarrhea or constipation. No melena or hematochezia.    : No dysuria, frequent or hematuria.  Neuro: No numbness or weakness  Skin: No itching or rashes	    MEDICATIONS  (STANDING):  ALBUTerol/ipratropium for Nebulization 3 milliLiter(s) Nebulizer every 6 hours  argatroban Infusion 2 MICROgram(s)/kG/Min (8.16 mL/Hr) IV Continuous <Continuous>  aspirin enteric coated 81 milliGRAM(s) Oral daily  atorvastatin 80 milliGRAM(s) Oral at bedtime  docusate sodium 100 milliGRAM(s) Oral daily  hydrALAZINE 100 milliGRAM(s) Oral every 8 hours  isosorbide   dinitrate Tablet (ISORDIL) 20 milliGRAM(s) Oral three times a day  metoprolol succinate ER 50 milliGRAM(s) Oral daily  senna 1 Tablet(s) Oral at bedtime  sevelamer hydrochloride 1600 milliGRAM(s) Oral three times a day with meals    MEDICATIONS  (PRN):  polyethylene glycol 3350 17 Gram(s) Oral daily PRN Constipation        PHYSICAL EXAM:  Vital Signs Last 24 Hrs  T(C): 36.6 (18 Feb 2018 08:00), Max: 37.1 (17 Feb 2018 12:00)  T(F): 97.8 (18 Feb 2018 08:00), Max: 98.7 (17 Feb 2018 12:00)  HR: 90 (18 Feb 2018 09:00) (85 - 97)  BP: --  BP(mean): --  RR: 18 (18 Feb 2018 09:00) (17 - 30)  SpO2: 93% (18 Feb 2018 09:00) (92% - 100%)  I&O's Summary    17 Feb 2018 07:01  -  18 Feb 2018 07:00  --------------------------------------------------------  IN: 1528.6 mL / OUT: 2200 mL / NET: -671.4 mL        Appearance: Normal	  HEENT:   Normal oral mucosa, PERRL, EOMI	  Lymphatic: No lymphadenopathy  Cardiovascular: Normal S1 S2, No JVD, No murmurs, No edema  Respiratory: Lungs clear to auscultation	  Psychiatry: A & O x 2 person and place, Mood & affect flat   Gastrointestinal:  Soft, Non-tender, + BS	  Skin: No rashes, No ecchymoses, No cyanosis	  Neurologic: Non-focal  Extremities: Normal range of motion, No clubbing, cyanosis or edema staples bilateral groin D&I with slight errythemia,   Vascular: Peripheral pulses palpable 2+ bilaterally RR hector D&I, L IJ cordis D&I no errrythemia L IJ pascale D&I    LABS:	 	                        7.8    10.0  )-----------( 174      ( 18 Feb 2018 05:44 )             22.2     Auto Eosinophil # x     / Auto Eosinophil % x     / Auto Neutrophil # x     / Auto Neutrophil % x     / BANDS % x                            8.0    11.9  )-----------( 130      ( 17 Feb 2018 03:16 )             24.0     Auto Eosinophil # x     / Auto Eosinophil % x     / Auto Neutrophil # x     / Auto Neutrophil % x     / BANDS % x                            7.7    10.5  )-----------( 104      ( 16 Feb 2018 23:01 )             22.8     Auto Eosinophil # x     / Auto Eosinophil % x     / Auto Neutrophil # x     / Auto Neutrophil % x     / BANDS % x        INR: 1.87 ratio (02-18 @ 05:44)  INR: 1.94 ratio (02-17 @ 03:16)  INR: 1.91 ratio (02-16 @ 23:01)    02-18    133<L>  |  91<L>  |  78<H>  ----------------------------<  102<H>  4.4   |  21<L>  |  6.83<H>  02-17    137  |  95<L>  |  43<H>  ----------------------------<  98  4.2   |  24  |  4.82<H>    Ca    8.3<L>      18 Feb 2018 05:43  Mg     2.6     02-18  Phos  6.7     02-18  TPro  6.4  /  Alb  2.7<L>  /  TBili  0.4  /  DBili  x   /  AST  40  /  ALT  24  /  AlkPhos  57  02-18  TPro  6.8  /  Alb  2.8<L>  /  TBili  0.3  /  DBili  x   /  AST  38  /  ALT  25  /  AlkPhos  60  02-17        proBNP:   Lipid Profile: 02-04 Chol 145 LDL 87 HDL 26<L> Trig 162<H>  HgA1c: 5.4 % (02-04 @ 21:37)    TSH:     CARDIAC MARKERS:   12 Feb 2018 02:21 Troponin 0.62 ng/mL / Creatine Kinase 61 U/L /  CKMB 2.7 ng/mL / CPK Mass Assay % x       05 Feb 2018 05:05 Troponin 0.73 ng/mL / Creatine Kinase 156 U/L /  CKMB 14.0 ng/mL / CPK Mass Assay % 9.0 %   05 Feb 2018 01:17 Troponin 0.80 ng/mL / Creatine Kinase 158 U/L /  CKMB 15.2 ng/mL / CPK Mass Assay % 9.6 %        TELEMETRY: ST	    ECG:  ST with nos[pecific intraventricular block	  RADIOLOGY:  OTHER: 	    PREVIOUS DIAGNOSTIC TESTING:    [ ] Echocardiogram: pending  [ ]  Catheterization: Reviewed diagnostic    	  Jose Church  Contact #

## 2018-02-18 NOTE — PHYSICAL THERAPY INITIAL EVALUATION ADULT - LIVES WITH, PROFILE
as per pt, pt lives c girlfriend, PH< 4 steps to enter, no steps inside. PTA, pt independent with all ADLs and functional activities with no AD.

## 2018-02-18 NOTE — PROGRESS NOTE ADULT - ASSESSMENT
Pt received back s/p cath    1) CAD s/p intervention  -start plavix 75 daily on 2/19  -restart argatroban 2 hours post radial band removal  -remove radial band at 1830  -will monitor for s&s of fluid overload

## 2018-02-18 NOTE — PROGRESS NOTE ADULT - ASSESSMENT
HPI:  This is a 77 year old man with past medical history of AAA 5.5cm s/p EVAAR repair with stents on 1/29 with incidental finding of Right Renal Neoplasm, previous history of Left Renal Neoplasm with Left Nephrectomy in 2008, Bladder Cancer, Known LBBB, HTN, HLD transferred from [facility] after initially presenting with SOB which began last night. Patient states that he was discharged from the hospital following AAA repair and recovering well.  He was walking with walker at home with no complaints.  After going to bed, he was awoken from his sleep complaining of SOB with no relief.  He went to [facility] wtih his SOB getting progressively worse with 1 episode of vomitus before going to hospital.  Patient denies fever, chills, recent sick contact, Chest pain, Abdominal pain, diarrhea.  At [place], paient with elevate d-dimers and transferred for possible PE.  At [facility], patient with  RICKI in V2-V4 with elevated Trops, and ADHF requiring Bipap. (04 Feb 2018 17:51)

## 2018-02-18 NOTE — CHART NOTE - NSCHARTNOTEFT_GEN_A_CORE
Removal of Radial Band    Pulses in the (right/left) upper extremity are (palpable/audible by doppler/absent). The patient was placed in the supine position. The insertion site was identified and the band deflated per protocol. The radial band was removed slowly. Direct pressure was applied for  __35____ minutes/not applicable.      Monitoring of the (right/left) wrists and both upper extremities including neuro-vascular checks and vital signs every 15 minutes x 4.    Complications: None    Comments:  Cath site (right radial artery) was persistently oozing after band removal.  Pressure held for a total of 35 mins.  No hematoma noted.  Pressure dressing applied.  Capillary refill 3 sec.      Lety Dyer NP  Cardiology

## 2018-02-18 NOTE — PROGRESS NOTE ADULT - PROBLEM SELECTOR PLAN 1
Patient with DONNIE on CKD in setting of significant renal artery occlusion: Baseline creatinine is between 1.2-1.3. Scr. on presentation was 6.48. Complements noted to be WNL.  Patient was initiated on CVVHDF on 2/5/18 and transitioned to IHD. Patient is currently dialysis dependent. s/p tunneled HD catheter placement. Schedule for PUF 2/19  Monitor BMP daily.  If pt exhibits s&s of fluid overload post cath will dialyse today

## 2018-02-18 NOTE — PHYSICAL THERAPY INITIAL EVALUATION ADULT - PRECAUTIONS/LIMITATIONS, REHAB EVAL
cardiac precautions/He went to [facility] wtih his SOB getting progressively worse with 1 episode of vomitus before going to hospital. pt denies fever, chills, recent sick contact, Chest pain, Abdominal pain, diarrhea. At [place], pt with elevate d-dimers and transferred for possible PE. At [facility], patient with  RICKI in V2-V4 with elevated Trops, and ADHF requiring Bipap. TTE 2/4 No Pericardial Effusion./fall precautions

## 2018-02-18 NOTE — CHART NOTE - NSCHARTNOTEFT_GEN_A_CORE
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 s/t bleeding from [facility] site s/p 1 un pRBC 2/12. Renal artery unable to open. Permacath placed by IR on 2/16    ====================  NEW EVENTS:  ====================    MEDICATIONS  (STANDING):  ALBUTerol/ipratropium for Nebulization 3 milliLiter(s) Nebulizer every 6 hours  argatroban Infusion 2 MICROgram(s)/kG/Min (8.16 mL/Hr) IV Continuous <Continuous>  aspirin enteric coated 81 milliGRAM(s) Oral daily  atorvastatin 80 milliGRAM(s) Oral at bedtime  docusate sodium 100 milliGRAM(s) Oral daily  hydrALAZINE 100 milliGRAM(s) Oral every 8 hours  isosorbide   dinitrate Tablet (ISORDIL) 20 milliGRAM(s) Oral three times a day  metoprolol succinate ER 50 milliGRAM(s) Oral daily  senna 1 Tablet(s) Oral at bedtime  sevelamer hydrochloride 1600 milliGRAM(s) Oral three times a day with meals    MEDICATIONS  (PRN):  polyethylene glycol 3350 17 Gram(s) Oral daily PRN Constipation    ====================  VITALS (Last 12 hrs):  ====================    T(C): 36.7 (02-18-18 @ 22:30), Max: 36.7 (02-18-18 @ 13:00)  T(F): 98 (02-18-18 @ 22:30), Max: 98 (02-18-18 @ 13:00)  HR: 90 (02-18-18 @ 22:30) (82 - 94)  BP: --  BP(mean): --  ABP: 131/59 (02-18-18 @ 22:30) (124/53 - 160/85)  ABP(mean): 86 (02-18-18 @ 22:30) (78 - 115)  RR: 21 (02-18-18 @ 22:30) (15 - 26)  SpO2: 97% (02-18-18 @ 22:30) (95% - 100%)  Wt(kg): --  CVP(mm Hg): --  CVP(cm H2O): --  CO: --  CI: --  PA: --  PA(mean): --  PCWP: --  SVR: --  PVR: --    I&O's Summary    17 Feb 2018 07:01 - 18 Feb 2018 07:00  --------------------------------------------------------  IN: 1656.8 mL / OUT: 2200 mL / NET: -543.2 mL    18 Feb 2018 07:01 - 18 Feb 2018 23:02  --------------------------------------------------------  IN: 857.4 mL / OUT: 0 mL / NET: 857.4 mL    ====================  NEW LABS:  ====================    02-18    133<L>  |  91<L>  |  78<H>  ----------------------------<  102<H>  4.4   |  21<L>  |  6.83<H>    Ca    8.3<L>      18 Feb 2018 05:43  Phos  6.7     02-18  Mg     2.6     02-18    TPro  6.4  /  Alb  2.7<L>  /  TBili  0.4  /  DBili  x   /  AST  40  /  ALT  24  /  AlkPhos  57  02-18    PT/INR - ( 18 Feb 2018 05:44 )   PT: 20.4 sec;   INR: 1.87 ratio      PTT - ( 18 Feb 2018 05:44 )  PTT:55.2 sec    ====================  PLAN:  ====================  -       Lety PATEL NP  Beeper #[id number]  Spectra # [id number] ====================  CCU MIDNIGHT ROUNDS  ====================    DEUCE BALL  [id number]  Patient is a 77y old  Male who presents with a chief complaint of Shortness of Breath (04 Feb 2018 17:51)    ====================  SUMMARY:  ====================  77M PMH AAA 5.5cm s/p EVAAR repair on 1/29, R kidney Neoplasm, previous history of L kidney neoplasm with Lnephrectomy 08, Bladder Ca, Known LBBB, HTN, HLD transferred from OSH w/ ADHF, DONNIE on CKD s/p CVV, now on HD, multifocal PNA, NSTEMI, c/b rapid a fib ([id number] 5)  on heparin gtt, 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 [facility] 1 un pRBC 2/12. Renal artery unable to open. Permacath placed by IR on 2/16    ====================  NEW EVENTS:  ====================  S/P LHC with RICHARDSON x 2 to pLAD and x 1 to OM1 via right radial artery.  Argatroban drip @ 2 mcg/kg/min restarted 2 hours after band removal.      MEDICATIONS  (STANDING):  ALBUTerol/ipratropium for Nebulization 3 milliLiter(s) Nebulizer every 6 hours  argatroban Infusion 2 MICROgram(s)/kG/Min (8.16 mL/Hr) IV Continuous <Continuous>  aspirin enteric coated 81 milliGRAM(s) Oral daily  atorvastatin 80 milliGRAM(s) Oral at bedtime  docusate sodium 100 milliGRAM(s) Oral daily  hydrALAZINE 100 milliGRAM(s) Oral every 8 hours  isosorbide   dinitrate Tablet (ISORDIL) 20 milliGRAM(s) Oral three times a day  metoprolol succinate ER 50 milliGRAM(s) Oral daily  senna 1 Tablet(s) Oral at bedtime  sevelamer hydrochloride 1600 milliGRAM(s) Oral three times a day with meals    MEDICATIONS  (PRN):  polyethylene glycol 3350 17 Gram(s) Oral daily PRN Constipation    ====================  VITALS (Last 12 hrs):  ====================    T(C): 36.7 (02-18-18 @ 22:30), Max: 36.7 (02-18-18 @ 13:00)  T(F): 98 (02-18-18 @ 22:30), Max: 98 (02-18-18 @ 13:00)  HR: 90 (02-18-18 @ 22:30) (82 - 94)  BP: --  BP(mean): --  ABP: 131/59 (02-18-18 @ 22:30) (124/53 - 160/85)  ABP(mean): 86 (02-18-18 @ 22:30) (78 - 115)  RR: 21 (02-18-18 @ 22:30) (15 - 26)  SpO2: 97% (02-18-18 @ 22:30) (95% - 100%)  Wt(kg): --  CVP(mm Hg): --  CVP(cm H2O): --  CO: --  CI: --  PA: --  PA(mean): --  PCWP: --  SVR: --  PVR: --    I&O's Summary    17 Feb 2018 07:01 - 18 Feb 2018 07:00  --------------------------------------------------------  IN: 1656.8 mL / OUT: 2200 mL / NET: -543.2 mL    18 Feb 2018 07:01  -  18 Feb 2018 23:02  --------------------------------------------------------  IN: 857.4 mL / OUT: 0 mL / NET: 857.4 mL    ====================  NEW LABS:  ====================    02-18    133<L>  |  91<L>  |  78<H>  ----------------------------<  102<H>  4.4   |  21<L>  |  6.83<H>    Ca    8.3<L>      18 Feb 2018 05:43  Phos  6.7     02-18  Mg     2.6     02-18    TPro  6.4  /  Alb  2.7<L>  /  TBili  0.4  /  DBili  x   /  AST  40  /  ALT  24  /  AlkPhos  57  02-18    PT/INR - ( 18 Feb 2018 05:44 )   PT: 20.4 sec;   INR: 1.87 ratio      PTT - ( 18 Feb 2018 05:44 )  PTT:55.2 sec  ====================  PLAN:  ====================  - Patient in s/p PCI with RICHARDSON x 2 to pLAD and x 1 to OM1  - Continue Argatroban drip for Afib  - Start Coumadin and dose daily to keep INR 2-3  - Monitor for bleeding  - Lorenzo Dyer CCU NP  Beeper #[id number]  Spectra # [phone] ====================  CCU MIDNIGHT ROUNDS  ====================    DEUCE BALL  [id number]  Patient is a 77y old  Male who presents with a chief complaint of Shortness of Breath (04 Feb 2018 17:51)    ====================  SUMMARY:  ====================  77M PMH AAA 5.5cm s/p EVAAR repair on 1/29, R kidney Neoplasm, previous history of L kidney neoplasm with Lnephrectomy 08, Bladder Ca, Known LBBB, HTN, HLD transferred from OSH w/ ADHF, DONNIE on CKD s/p CVV, now on HD, multifocal PNA, NSTEMI, c/b rapid a fib ([id number] 5)  on heparin gtt, 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 [facility] p[facility]/12. Renal artery unable to open. Permacath placed by IR on 2/16    ====================  NEW EVENTS:  ====================  S/P LHC with RICHARDSON x 2 to pLAD and x 1 to OM1 via right radial artery.  Argatroban drip @ 2 mcg/kg/min restarted 2 hours after band removal.      MEDICATIONS  (STANDING):  ALBUTerol/ipratropium for Nebulization 3 milliLiter(s) Nebulizer every 6 hours  argatroban Infusion 2 MICROgram(s)/kG/Min (8.16 mL/Hr) IV Continuous <Continuous>  aspirin enteric coated 81 milliGRAM(s) Oral daily  atorvastatin 80 milliGRAM(s) Oral at bedtime  docusate sodium 100 milliGRAM(s) Oral daily  hydrALAZINE 100 milliGRAM(s) Oral every 8 hours  isosorbide   dinitrate Tablet (ISORDIL) 20 milliGRAM(s) Oral three times a day  metoprolol succinate ER 50 milliGRAM(s) Oral daily  senna 1 Tablet(s) Oral at bedtime  sevelamer hydrochloride 1600 milliGRAM(s) Oral three times a day with meals    MEDICATIONS  (PRN):  polyethylene glycol 3350 17 Gram(s) Oral daily PRN Constipation    ====================  VITALS (Last 12 hrs):  ====================    T(C): 36.7 (02-18-18 @ 22:30), Max: 36.7 (02-18-18 @ 13:00)  T(F): 98 (02-18-18 @ 22:30), Max: 98 (02-18-18 @ 13:00)  HR: 90 (02-18-18 @ 22:30) (82 - 94)  BP: --  BP(mean): --  ABP: 131/59 (02-18-18 @ 22:30) (124/53 - 160/85)  ABP(mean): 86 (02-18-18 @ 22:30) (78 - 115)  RR: 21 (02-18-18 @ 22:30) (15 - 26)  SpO2: 97% (02-18-18 @ 22:30) (95% - 100%)  Wt(kg): --  CVP(mm Hg): --  CVP(cm H2O): --  CO: --  CI: --  PA: --  PA(mean): --  PCWP: --  SVR: --  PVR: --    I&O's Summary    17 Feb 2018 07:01 - 18 Feb 2018 07:00  --------------------------------------------------------  IN: 1656.8 mL / OUT: 2200 mL / NET: -543.2 mL    18 Feb 2018 07:01  -  18 Feb 2018 23:02  --------------------------------------------------------  IN: 857.4 mL / OUT: 0 mL / NET: 857.4 mL    ====================  NEW LABS:  ====================    02-18    133<L>  |  91<L>  |  78<H>  ----------------------------<  102<H>  4.4   |  21<L>  |  6.83<H>    Ca    8.3<L>      18 Feb 2018 05:43  Phos  6.7     02-18  Mg     2.6     02-18    TPro  6.4  /  Alb  2.7<L>  /  TBili  0.4  /  DBili  x   /  AST  40  /  ALT  24  /  AlkPhos  57  02-18    PT/INR - ( 18 Feb 2018 05:44 )   PT: 20.4 sec;   INR: 1.87 ratio      PTT - ( 18 Feb 2018 05:44 )  PTT:55.2 sec  ====================  PLAN:  ====================  - Patient in s/p PCI with RICHARDSON x 2 to pLAD and x 1 to OM1  - Continue Argatroban drip for Afib; discontinue once INR 2-3  - Start Coumadin and dose daily to keep INR 2-3; bridge for at least 5 days  - Monitor for bleeding  - Continue DAPT, BB, and high-intensity statin  - Hold ACEI due to DONNIE on CKD  - Continue ISN and Hydralazine  - HD in am  - Monitor electrolytes  - Follow up with vascular re: staple removal on B/L kristyn Dyer CCU NP  Beeper #[id number]  Spectra # [phone]

## 2018-02-18 NOTE — PHYSICAL THERAPY INITIAL EVALUATION ADULT - GAIT DEVIATIONS NOTED, PT EVAL
decreased trace/decreased velocity of limb motion/decreased step length/decreased stride length/decreased weight-shifting ability

## 2018-02-18 NOTE — PHYSICAL THERAPY INITIAL EVALUATION ADULT - PERTINENT HX OF CURRENT PROBLEM, REHAB EVAL
77yoM, pmhx below. p/w SOB. pt states that he was discharged from the hospital following AAA repair and recovering well. He was walking with walker at home with no complaints. After going to bed, he was awoken from his sleep complaining of SOB with no relief.

## 2018-02-18 NOTE — PROGRESS NOTE ADULT - SUBJECTIVE AND OBJECTIVE BOX
Patient received back s/p cath via RR artery OM1x1 RICHARDSON, pLADx2 RICHARDSON  s/p angiomax      REVIEW OF SYSTEMS:  CONSTITUTIONAL: No weakness, fevers or chills  Eyes/ENT: No visual changes: No vertigo or throat pain  NECK: No pain or stiffness  Resp: No cough, wheezing, hemoptysis; No shortness of breath  Cardiovascular: No chest pain or palpitations  GI: No abdominal or epigastric pain. No nausea, vomiting, or hematemesis: No diarrhea or constipation. No melena or hematochezia.    : No dysuria, fequent or hematuria.  Neuro: No numbness or wekaness  Skin: No itching or rashes	    MEDICATIONS  (STANDING):  ALBUTerol/ipratropium for Nebulization 3 milliLiter(s) Nebulizer every 6 hours  argatroban Infusion 2 MICROgram(s)/kG/Min (8.16 mL/Hr) IV Continuous on hold till 2 hours  after radial band removal   aspirin enteric coated 81 milliGRAM(s) Oral daily  atorvastatin 80 milliGRAM(s) Oral at bedtime  docusate sodium 100 milliGRAM(s) Oral daily  hydrALAZINE 100 milliGRAM(s) Oral every 8 hours  isosorbide   dinitrate Tablet (ISORDIL) 20 milliGRAM(s) Oral three times a day  metoprolol succinate ER 50 milliGRAM(s) Oral daily  senna 1 Tablet(s) Oral at bedtime  sevelamer hydrochloride 1600 milliGRAM(s) Oral three times a day with meals    MEDICATIONS  (PRN):  polyethylene glycol 3350 17 Gram(s) Oral daily PRN Constipation        PHYSICAL EXAM:  Vital Signs Last 24 Hrs  T(C): 36.7 (18 Feb 2018 13:00), Max: 36.7 (17 Feb 2018 20:00)  T(F): 98 (18 Feb 2018 13:00), Max: 98 (17 Feb 2018 20:00)  HR: 94 (18 Feb 2018 15:00) (85 - 96)  BP: --  BP(mean): --  RR: 25 (18 Feb 2018 15:00) (15 - 30)  SpO2: 96% (18 Feb 2018 15:00) (92% - 100%)  I&O's Summary    17 Feb 2018 07:01  -  18 Feb 2018 07:00  --------------------------------------------------------  IN: 1656.8 mL / OUT: 2200 mL / NET: -543.2 mL    18 Feb 2018 07:01  -  18 Feb 2018 16:47  --------------------------------------------------------  IN: 657.4 mL / OUT: 0 mL / NET: 657.4 mL        Appearance: Normal	  HEENT:   Normal oral mucosa, PERRL, EOMI	  Lymphatic: No lymphadenopathy  Cardiovascular: Normal S1 S2, No JVD, No murmurs, No edema  Respiratory: Lungs coarse breath sounds bilaterally with rhonchi  Psychiatry: A & O x 2 person and place, Mood & affect flat   Gastrointestinal:  Soft, Non-tender, + BS	  Skin: No rashes, No ecchymoses, No cyanosis	  Neurologic: Non-focal  Extremities: Normal range of motion, No clubbing, cyanosis or edema staples bilateral groin D&I with slight errythemia,   Vascular: Peripheral pulses palpable 2+ bilaterally RR hector D&I, L IJ cordis D&I no errrythemia L IJ shiley D&I R radial band in place      LABS:	 	                        7.8    10.0  )-----------( 174      ( 18 Feb 2018 05:44 )             22.2     Auto Eosinophil # x     / Auto Eosinophil % x     / Auto Neutrophil # x     / Auto Neutrophil % x     / BANDS % x                            8.0    11.9  )-----------( 130      ( 17 Feb 2018 03:16 )             24.0     Auto Eosinophil # x     / Auto Eosinophil % x     / Auto Neutrophil # x     / Auto Neutrophil % x     / BANDS % x                            7.7    10.5  )-----------( 104      ( 16 Feb 2018 23:01 )             22.8     Auto Eosinophil # x     / Auto Eosinophil % x     / Auto Neutrophil # x     / Auto Neutrophil % x     / BANDS % x        INR: 1.87 ratio (02-18 @ 05:44)  INR: 1.94 ratio (02-17 @ 03:16)  INR: 1.91 ratio (02-16 @ 23:01)    02-18    133<L>  |  91<L>  |  78<H>  ----------------------------<  102<H>  4.4   |  21<L>  |  6.83<H>  02-17    137  |  95<L>  |  43<H>  ----------------------------<  98  4.2   |  24  |  4.82<H>    Ca    8.3<L>      18 Feb 2018 05:43  Mg     2.6     02-18  Phos  6.7     02-18  TPro  6.4  /  Alb  2.7<L>  /  TBili  0.4  /  DBili  x   /  AST  40  /  ALT  24  /  AlkPhos  57  02-18  TPro  6.8  /  Alb  2.8<L>  /  TBili  0.3  /  DBili  x   /  AST  38  /  ALT  25  /  AlkPhos  60  02-17        proBNP:   Lipid Profile: 02-04 Chol 145 LDL 87 HDL 26<L> Trig 162<H>  HgA1c: 5.4 % (02-04 @ 21:37)    TSH:     CARDIAC MARKERS:   12 Feb 2018 02:21 Troponin 0.62 ng/mL / Creatine Kinase 61 U/L /  CKMB 2.7 ng/mL / CPK Mass Assay % x       05 Feb 2018 05:05 Troponin 0.73 ng/mL / Creatine Kinase 156 U/L /  CKMB 14.0 ng/mL / CPK Mass Assay % 9.0 %   05 Feb 2018 01:17 Troponin 0.80 ng/mL / Creatine Kinase 158 U/L /  CKMB 15.2 ng/mL / CPK Mass Assay % 9.6 %        TELEMETRY: SR    ECG:  	NSR w/ LBBB @ 91  HEALTH ISSUES - PROBLEM Dx:  Abdominal aortic aneurysm (AAA) without rupture: Abdominal aortic aneurysm (AAA) without rupture  Hypervolemia: Hypervolemia  PNA (pneumonia): PNA (pneumonia)  CAD (coronary artery disease): CAD (coronary artery disease)  Acute systolic heart failure: Acute systolic heart failure  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

## 2018-02-18 NOTE — PROGRESS NOTE ADULT - PROBLEM SELECTOR PLAN 3
Patient's volume status seems to be improved   monitor post cath for volume overload  able to lay flat

## 2018-02-19 DIAGNOSIS — R00.0 TACHYCARDIA, UNSPECIFIED: ICD-10-CM

## 2018-02-19 DIAGNOSIS — E87.8 OTHER DISORDERS OF ELECTROLYTE AND FLUID BALANCE, NOT ELSEWHERE CLASSIFIED: ICD-10-CM

## 2018-02-19 LAB
ALBUMIN SERPL ELPH-MCNC: 2.5 G/DL — LOW (ref 3.3–5)
ALBUMIN SERPL ELPH-MCNC: 2.8 G/DL — LOW (ref 3.3–5)
ALP SERPL-CCNC: 58 U/L — SIGNIFICANT CHANGE UP (ref 40–120)
ALP SERPL-CCNC: 62 U/L — SIGNIFICANT CHANGE UP (ref 40–120)
ALT FLD-CCNC: 20 U/L RC — SIGNIFICANT CHANGE UP (ref 10–45)
ALT FLD-CCNC: 22 U/L RC — SIGNIFICANT CHANGE UP (ref 10–45)
ANION GAP SERPL CALC-SCNC: 21 MMOL/L — HIGH (ref 5–17)
ANION GAP SERPL CALC-SCNC: 23 MMOL/L — HIGH (ref 5–17)
APTT BLD: 68.3 SEC — HIGH (ref 27.5–37.4)
AST SERPL-CCNC: 39 U/L — SIGNIFICANT CHANGE UP (ref 10–40)
AST SERPL-CCNC: 41 U/L — HIGH (ref 10–40)
BASOPHILS # BLD AUTO: 0.1 K/UL — SIGNIFICANT CHANGE UP (ref 0–0.2)
BASOPHILS NFR BLD AUTO: 0.6 % — SIGNIFICANT CHANGE UP (ref 0–2)
BILIRUB SERPL-MCNC: 0.3 MG/DL — SIGNIFICANT CHANGE UP (ref 0.2–1.2)
BILIRUB SERPL-MCNC: 0.4 MG/DL — SIGNIFICANT CHANGE UP (ref 0.2–1.2)
BUN SERPL-MCNC: 60 MG/DL — HIGH (ref 7–23)
BUN SERPL-MCNC: 94 MG/DL — HIGH (ref 7–23)
CALCIUM SERPL-MCNC: 8.1 MG/DL — LOW (ref 8.4–10.5)
CALCIUM SERPL-MCNC: 8.3 MG/DL — LOW (ref 8.4–10.5)
CHLORIDE SERPL-SCNC: 89 MMOL/L — LOW (ref 96–108)
CHLORIDE SERPL-SCNC: 92 MMOL/L — LOW (ref 96–108)
CO2 SERPL-SCNC: 17 MMOL/L — LOW (ref 22–31)
CO2 SERPL-SCNC: 22 MMOL/L — SIGNIFICANT CHANGE UP (ref 22–31)
CREAT SERPL-MCNC: 5.75 MG/DL — HIGH (ref 0.5–1.3)
CREAT SERPL-MCNC: 8.28 MG/DL — HIGH (ref 0.5–1.3)
EOSINOPHIL # BLD AUTO: 0.1 K/UL — SIGNIFICANT CHANGE UP (ref 0–0.5)
EOSINOPHIL NFR BLD AUTO: 1.4 % — SIGNIFICANT CHANGE UP (ref 0–6)
GLUCOSE SERPL-MCNC: 107 MG/DL — HIGH (ref 70–99)
GLUCOSE SERPL-MCNC: 168 MG/DL — HIGH (ref 70–99)
HCT VFR BLD CALC: 21.3 % — LOW (ref 39–50)
HGB BLD-MCNC: 7.5 G/DL — LOW (ref 13–17)
INR BLD: 1.9 RATIO — HIGH (ref 0.88–1.16)
LYMPHOCYTES # BLD AUTO: 0.7 K/UL — LOW (ref 1–3.3)
LYMPHOCYTES # BLD AUTO: 8 % — LOW (ref 13–44)
MAGNESIUM SERPL-MCNC: 2.5 MG/DL — SIGNIFICANT CHANGE UP (ref 1.6–2.6)
MAGNESIUM SERPL-MCNC: 2.9 MG/DL — HIGH (ref 1.6–2.6)
MCHC RBC-ENTMCNC: 32.7 PG — SIGNIFICANT CHANGE UP (ref 27–34)
MCHC RBC-ENTMCNC: 35.4 GM/DL — SIGNIFICANT CHANGE UP (ref 32–36)
MCV RBC AUTO: 92.2 FL — SIGNIFICANT CHANGE UP (ref 80–100)
MONOCYTES # BLD AUTO: 0.6 K/UL — SIGNIFICANT CHANGE UP (ref 0–0.9)
MONOCYTES NFR BLD AUTO: 6.7 % — SIGNIFICANT CHANGE UP (ref 2–14)
NEUTROPHILS # BLD AUTO: 7.8 K/UL — HIGH (ref 1.8–7.4)
NEUTROPHILS NFR BLD AUTO: 83.3 % — HIGH (ref 43–77)
PHOSPHATE SERPL-MCNC: 4.3 MG/DL — SIGNIFICANT CHANGE UP (ref 2.5–4.5)
PHOSPHATE SERPL-MCNC: 7.2 MG/DL — HIGH (ref 2.5–4.5)
PLATELET # BLD AUTO: 192 K/UL — SIGNIFICANT CHANGE UP (ref 150–400)
POTASSIUM SERPL-MCNC: 3.8 MMOL/L — SIGNIFICANT CHANGE UP (ref 3.5–5.3)
POTASSIUM SERPL-MCNC: 4.9 MMOL/L — SIGNIFICANT CHANGE UP (ref 3.5–5.3)
POTASSIUM SERPL-SCNC: 3.8 MMOL/L — SIGNIFICANT CHANGE UP (ref 3.5–5.3)
POTASSIUM SERPL-SCNC: 4.9 MMOL/L — SIGNIFICANT CHANGE UP (ref 3.5–5.3)
PROT SERPL-MCNC: 6.5 G/DL — SIGNIFICANT CHANGE UP (ref 6–8.3)
PROT SERPL-MCNC: 6.7 G/DL — SIGNIFICANT CHANGE UP (ref 6–8.3)
PROTHROM AB SERPL-ACNC: 21 SEC — HIGH (ref 9.8–12.7)
RBC # BLD: 2.31 M/UL — LOW (ref 4.2–5.8)
RBC # FLD: 12.4 % — SIGNIFICANT CHANGE UP (ref 10.3–14.5)
SODIUM SERPL-SCNC: 129 MMOL/L — LOW (ref 135–145)
SODIUM SERPL-SCNC: 135 MMOL/L — SIGNIFICANT CHANGE UP (ref 135–145)
WBC # BLD: 9.4 K/UL — SIGNIFICANT CHANGE UP (ref 3.8–10.5)
WBC # FLD AUTO: 9.4 K/UL — SIGNIFICANT CHANGE UP (ref 3.8–10.5)

## 2018-02-19 PROCEDURE — 90935 HEMODIALYSIS ONE EVALUATION: CPT | Mod: GC

## 2018-02-19 PROCEDURE — 99233 SBSQ HOSP IP/OBS HIGH 50: CPT

## 2018-02-19 PROCEDURE — 93010 ELECTROCARDIOGRAM REPORT: CPT

## 2018-02-19 PROCEDURE — 71045 X-RAY EXAM CHEST 1 VIEW: CPT | Mod: 26

## 2018-02-19 RX ORDER — METOPROLOL TARTRATE 50 MG
75 TABLET ORAL DAILY
Qty: 0 | Refills: 0 | Status: DISCONTINUED | OUTPATIENT
Start: 2018-02-20 | End: 2018-02-21

## 2018-02-19 RX ORDER — METOPROLOL TARTRATE 50 MG
25 TABLET ORAL ONCE
Qty: 0 | Refills: 0 | Status: COMPLETED | OUTPATIENT
Start: 2018-02-19 | End: 2018-02-19

## 2018-02-19 RX ORDER — ACETYLCYSTEINE 200 MG/ML
3 VIAL (ML) MISCELLANEOUS EVERY 6 HOURS
Qty: 0 | Refills: 0 | Status: COMPLETED | OUTPATIENT
Start: 2018-02-19 | End: 2018-02-22

## 2018-02-19 RX ORDER — WARFARIN SODIUM 2.5 MG/1
2 TABLET ORAL ONCE
Qty: 0 | Refills: 0 | Status: COMPLETED | OUTPATIENT
Start: 2018-02-19 | End: 2018-02-19

## 2018-02-19 RX ORDER — DIAZEPAM 5 MG
5 TABLET ORAL
Qty: 0 | Refills: 0 | Status: DISCONTINUED | OUTPATIENT
Start: 2018-02-19 | End: 2018-02-20

## 2018-02-19 RX ORDER — ACETYLCYSTEINE 200 MG/ML
3 VIAL (ML) MISCELLANEOUS EVERY 6 HOURS
Qty: 0 | Refills: 0 | Status: DISCONTINUED | OUTPATIENT
Start: 2018-02-19 | End: 2018-02-19

## 2018-02-19 RX ADMIN — Medication 100 MILLIGRAM(S): at 12:55

## 2018-02-19 RX ADMIN — Medication 50 MILLIGRAM(S): at 05:04

## 2018-02-19 RX ADMIN — CLOPIDOGREL BISULFATE 75 MILLIGRAM(S): 75 TABLET, FILM COATED ORAL at 12:55

## 2018-02-19 RX ADMIN — Medication 100 MILLIGRAM(S): at 21:36

## 2018-02-19 RX ADMIN — SENNA PLUS 1 TABLET(S): 8.6 TABLET ORAL at 21:36

## 2018-02-19 RX ADMIN — SEVELAMER CARBONATE 1600 MILLIGRAM(S): 2400 POWDER, FOR SUSPENSION ORAL at 08:12

## 2018-02-19 RX ADMIN — Medication 100 MILLIGRAM(S): at 05:04

## 2018-02-19 RX ADMIN — Medication 5 MILLIGRAM(S): at 08:46

## 2018-02-19 RX ADMIN — ISOSORBIDE DINITRATE 20 MILLIGRAM(S): 5 TABLET ORAL at 21:36

## 2018-02-19 RX ADMIN — Medication 3 MILLILITER(S): at 18:55

## 2018-02-19 RX ADMIN — Medication 81 MILLIGRAM(S): at 12:55

## 2018-02-19 RX ADMIN — Medication 3 MILLILITER(S): at 05:00

## 2018-02-19 RX ADMIN — ATORVASTATIN CALCIUM 80 MILLIGRAM(S): 80 TABLET, FILM COATED ORAL at 21:36

## 2018-02-19 RX ADMIN — SEVELAMER CARBONATE 1600 MILLIGRAM(S): 2400 POWDER, FOR SUSPENSION ORAL at 17:33

## 2018-02-19 RX ADMIN — Medication 25 MILLIGRAM(S): at 08:46

## 2018-02-19 RX ADMIN — WARFARIN SODIUM 2 MILLIGRAM(S): 2.5 TABLET ORAL at 23:38

## 2018-02-19 RX ADMIN — Medication 3 MILLILITER(S): at 05:19

## 2018-02-19 RX ADMIN — SEVELAMER CARBONATE 1600 MILLIGRAM(S): 2400 POWDER, FOR SUSPENSION ORAL at 12:55

## 2018-02-19 RX ADMIN — Medication 3 MILLILITER(S): at 12:01

## 2018-02-19 RX ADMIN — ISOSORBIDE DINITRATE 20 MILLIGRAM(S): 5 TABLET ORAL at 05:04

## 2018-02-19 NOTE — CHART NOTE - NSCHARTNOTEFT_GEN_A_CORE
====================  CCU MIDNIGHT ROUNDS  ====================    DEUCE BALL  [id number]    ====================  SUMMARY:  77 year old man with past medical history of AAA 5.5cm s/p EVAAR repair with stents on 1/29 with incidental finding of Right Renal Neoplasm, previous history of Left Renal Neoplasm with Left Nephrectomy in 2008, Bladder Cancer, Known LBBB, HTN, HLD transferred from [facility] after initially presenting with SOB which began last night. Patient states that he was discharged from the hospital following AAA repair and recovering well.  He was walking with walker at home with no complaints.  After going to bed, he was awoken from his sleep complaining of SOB with no relief.  He went to [facility] wtih his SOB getting progressively worse with 1 episode of vomitus before going to hospital.  Patient denies fever, chills, recent sick contact, Chest pain, Abdominal pain, diarrhea.  At [place], paient with elevate d-dimers and transferred for possible PE.  At [facility], patient with  RICKI in V2-V4 with elevated Trops, and ADHF requiring Bipap. (04 Feb 2018 17:51)  ====================        ====================  NEW EVENTS:  ====================        ====================  VITALS (Last 12 hrs):  ====================    T(C): 36.6 (02-19-18 @ 20:00), Max: 36.6 (02-19-18 @ 20:00)  HR: 88 (02-19-18 @ 21:00) (83 - 131)  BP: 84/60 (02-19-18 @ 13:00) (84/60 - 84/60)  BP(mean): 68 (02-19-18 @ 13:00) (68 - 68)  ABP: 143/74 (02-19-18 @ 21:00) (79/53 - 143/74)  ABP(mean): 101 (02-19-18 @ 21:00) (60 - 101)  RR: 18 (02-19-18 @ 21:00) (14 - 25)  SpO2: 99% (02-19-18 @ 21:00) (91% - 100%)  Wt(kg): --  CVP(mm Hg): --  CO: --  CI: --  PA: --  PA(mean): --  PA(direct): --  PCWP: --  SVR: --    TELEMETRY:        *BLOOD GAS/ARTERIAL/MIXED/VENOUS  *LACTATE    I&O's Summary    18 Feb 2018 07:01  -  19 Feb 2018 07:00  --------------------------------------------------------  IN: 1123 mL / OUT: 0 mL / NET: 1123 mL    19 Feb 2018 07:01  -  19 Feb 2018 21:35  --------------------------------------------------------  IN: 843 mL / OUT: 950 mL / NET: -107 mL        ====================  PLAN:  ====================    HEALTH ISSUES - PROBLEM Dx:  Tachyarrhythmia: Tachyarrhythmia  Electrolyte and fluid disorder: Electrolyte and fluid disorder  Abdominal aortic aneurysm (AAA) without rupture: Abdominal aortic aneurysm (AAA) without rupture  Hypervolemia: Hypervolemia  PNA (pneumonia): PNA (pneumonia)  CAD (coronary artery disease): CAD (coronary artery disease)  Acute systolic heart failure: Acute systolic heart failure  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)  Hyperphosphatemia: Hyperphosphatemia  Acute renal failure superimposed on stage 3 chronic kidney disease, unspecified acute renal failure type: Acute renal failure superimposed on stage 3 chronic kidney disease, unspecified acute renal failure type  Acute decompensated heart failure: Acute decompensated heart failure  NSTEMI (non-ST elevated myocardial infarction): NSTEMI (non-ST elevated myocardial infarction)  Other hypervolemia: Other hypervolemia  Hyperkalemia: Hyperkalemia  DONNIE (acute kidney injury): DONNIE (acute kidney injury)        HEALTH ISSUES - R/O PROBLEM Dx:      Erica Billingsley, CCU NP   # ====================  CCU MIDNIGHT ROUNDS  ====================    DEUCE BALL  [id number]    ====================  SUMMARY:  HPI:   77 year old man with past medical history of AAA 5.5cm s/p EVAAR repair with stents on 1/29 with incidental finding of Right Renal Neoplasm, previous history of Left Renal Neoplasm with Left Nephrectomy in 2008, Bladder Cancer, Known LBBB, HTN, HLD transferred from [facility] after initially presenting with SOB which began last night. Patient states that he was discharged from the hospital following AAA repair and recovering well.  He was walking with walker at home with no complaints.  After going to bed, he was awoken from his sleep complaining of SOB with no relief.  He went to [facility] wtih his SOB getting progressively worse with 1 episode of vomitus before going to hospital.  Patient denies fever, chills, recent sick contact, Chest pain, Abdominal pain, diarrhea.  At [place], paient with elevate d-dimers and transferred for possible PE.  At [facility], patient with  RICKI in V2-V4 with elevated Trops, and ADHF requiring Bipap. (04 Feb 2018 17:51)  ====================        ====================  NEW EVENTS:  ====================        ====================  VITALS (Last 12 hrs):  ====================    T(C): 36.6 (02-19-18 @ 20:00), Max: 36.6 (02-19-18 @ 20:00)  HR: 88 (02-19-18 @ 21:00) (83 - 131)  BP: 84/60 (02-19-18 @ 13:00) (84/60 - 84/60)  BP(mean): 68 (02-19-18 @ 13:00) (68 - 68)  ABP: 143/74 (02-19-18 @ 21:00) (79/53 - 143/74)  ABP(mean): 101 (02-19-18 @ 21:00) (60 - 101)  RR: 18 (02-19-18 @ 21:00) (14 - 25)  SpO2: 99% (02-19-18 @ 21:00) (91% - 100%)  Wt(kg): --  CVP(mm Hg): --  CO: --  CI: --  PA: --  PA(mean): --  PA(direct): --  PCWP: --  SVR: --    TELEMETRY:        *BLOOD GAS/ARTERIAL/MIXED/VENOUS  *LACTATE    I&O's Summary    18 Feb 2018 07:01  -  19 Feb 2018 07:00  --------------------------------------------------------  IN: 1123 mL / OUT: 0 mL / NET: 1123 mL    19 Feb 2018 07:01  -  19 Feb 2018 21:35  --------------------------------------------------------  IN: 843 mL / OUT: 950 mL / NET: -107 mL        ====================  PLAN:  ====================    HEALTH ISSUES - PROBLEM Dx:  Tachyarrhythmia: Tachyarrhythmia  Electrolyte and fluid disorder: Electrolyte and fluid disorder  Abdominal aortic aneurysm (AAA) without rupture: Abdominal aortic aneurysm (AAA) without rupture  Hypervolemia: Hypervolemia  PNA (pneumonia): PNA (pneumonia)  CAD (coronary artery disease): CAD (coronary artery disease)  Acute systolic heart failure: Acute systolic heart failure  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)  Hyperphosphatemia: Hyperphosphatemia  Acute renal failure superimposed on stage 3 chronic kidney disease, unspecified acute renal failure type: Acute renal failure superimposed on stage 3 chronic kidney disease, unspecified acute renal failure type  Acute decompensated heart failure: Acute decompensated heart failure  NSTEMI (non-ST elevated myocardial infarction): NSTEMI (non-ST elevated myocardial infarction)  Other hypervolemia: Other hypervolemia  Hyperkalemia: Hyperkalemia  DONNIE (acute kidney injury): DONNIE (acute kidney injury)        HEALTH ISSUES - R/O PROBLEM Dx:      Erica Billingsley, CCU NP   # ====================  CCU MIDNIGHT ROUNDS  ====================    DEUCE BLAL  [id number]    ====================  SUMMARY:   77 year old man with past medical history of AAA 5.5cm s/p EVAAR repair with stents on 1/29 with incidental finding of Right Renal Neoplasm, previous history of Left Renal Neoplasm with Left Nephrectomy in 2008, Bladder Cancer, Known LBBB, HTN, HLD transferred from [facility] after initially presenting with SOB which began last night. Patient states that he was discharged from the hospital following AAA repair and recovering well.   After going to bed, he was awoken from his sleep complaining of SOB with no relief.  He went to [facility] wtih his SOB getting progressively worse with 1 episode of vomitus before going to hospital.  At [place], paient with elevate d-dimers and transferred for possible PE.  At [facility], patient with  RICKI in V2-V4 with elevated Trops, and ADHF requiring Bipap. (04 Feb 2018 17:51). Pt found to have renal artery stenosis secondary to partial occlusion of renal artery from recent EVAR. Pt also in DONNIE. During work-up a cardiac cath revealed triple vessel disease and pt needed to be started on HD of renal failure. It was deemed to high risk for a renal artery bypass so an attempt was made to stent the renal artery. This was unsuccessful. Pt underwent high risk PCI w/ RICHARDSON LAD, OM1, D1.   ====================  ====================  NEW EVENTS: Pt developed AF 130s w/ hypotension after 2nd hour of HD. HD cut short w/ 950 fluid removal.  ====================    ====================  VITALS (Last 12 hrs):  ====================    T(C): 36.6 (02-19-18 @ 20:00), Max: 36.6 (02-19-18 @ 20:00)  HR: 88 (02-19-18 @ 21:00) (83 - 131)  BP: 84/60 (02-19-18 @ 13:00) (84/60 - 84/60)  BP(mean): 68 (02-19-18 @ 13:00) (68 - 68)  ABP: 143/74 (02-19-18 @ 21:00) (79/53 - 143/74)  ABP(mean): 101 (02-19-18 @ 21:00) (60 - 101)  RR: 18 (02-19-18 @ 21:00) (14 - 25)  SpO2: 99% (02-19-18 @ 21:00) (91% - 100%)    TELEMETRY: NSR 60    I&O's Summary    18 Feb 2018 07:01  -  19 Feb 2018 07:00  --------------------------------------------------------  IN: 1123 mL / OUT: 0 mL / NET: 1123 mL    19 Feb 2018 07:01  -  19 Feb 2018 21:35  --------------------------------------------------------  IN: 843 mL / OUT: 950 mL / NET: -107 mL    ====================  PLAN:  ====================  ICU delirium: Daughter concerned about current bed due to distance from nursing station and pt's periods of confusion. Pt will be moved closer to nurses station.   Pulmonary congestion: resolving after HD today. Chest XR improved.   TVD s/p high risk PCI c/b HFrEF: Continue ASA, plavix, lipitor, hydral, isordil, toprol.   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 HD prn  AAA s/p EVAR: B/L groin staples removed.     Erica Billingsley, CCU NP   #[phone]

## 2018-02-19 NOTE — PROGRESS NOTE ADULT - SUBJECTIVE AND OBJECTIVE BOX
Admission date:   Chief complaint: Shortness of breath   HPI:  This is a 77 year old man with past medical history of AAA 5.5cm s/p EVAAR repair with stents on  with incidental finding of Right Renal Neoplasm, previous history of Left Renal Neoplasm with Left Nephrectomy in , Bladder Cancer, Known LBBB, HTN, HLD transferred from [facility] after initially presenting with SOB which began last night. Patient states that he was discharged from the hospital following AAA repair and recovering well.  He was walking with walker at home with no complaints.  After going to bed, he was awoken from his sleep complaining of SOB with no relief.  He went to [facility] wtih his SOB getting progressively worse with 1 episode of vomitus before going to hospital.  Patient denies fever, chills, recent sick contact, Chest pain, Abdominal pain, diarrhea.  At [place], paient with elevate d-dimers and transferred for possible PE.  At [facility], patient with  RICKI in V2-V4 with elevated Trops, and ADHF requiring Bipap. (2018 17:51)      Interval history: Pt c/o difficulty breathing overnight with coarse breath sounds on exam, given Duonebs/Mucomyst with improvement.  Argatroban gtt restarted after radial band removed, and Coumadin 2mg started.  No other acute events.     ROS: Pt admits to shortness of breath, cough.  Pt denies HA, CP, palpitations, abd pain, N/V.       Allergy:  Cipro (Other)  heparin (Other (Severe))  Levaquin (Other)  penicillin (Hives)      Medications:  MEDICATIONS  (STANDING):  acetylcysteine 10% Inhalation 3 milliLiter(s) Inhalation every 6 hours  ALBUTerol/ipratropium for Nebulization 3 milliLiter(s) Nebulizer every 6 hours  argatroban Infusion 2 MICROgram(s)/kG/Min (8.16 mL/Hr) IV Continuous <Continuous>  aspirin enteric coated 81 milliGRAM(s) Oral daily  atorvastatin 80 milliGRAM(s) Oral at bedtime  clopidogrel Tablet 75 milliGRAM(s) Oral daily  docusate sodium 100 milliGRAM(s) Oral daily  hydrALAZINE 100 milliGRAM(s) Oral every 8 hours  isosorbide   dinitrate Tablet (ISORDIL) 20 milliGRAM(s) Oral three times a day  metoprolol succinate ER 50 milliGRAM(s) Oral daily  senna 1 Tablet(s) Oral at bedtime  sevelamer hydrochloride 1600 milliGRAM(s) Oral three times a day with meals    MEDICATIONS  (PRN):  polyethylene glycol 3350 17 Gram(s) Oral daily PRN Constipation      PMH/PSH/FH/SH: [x ] Unchanged    Vitals:  T(C): 36.2 (18 @ 07:00), Max: 36.7 (18 @ 13:00)  HR: 89 (18 @ 07:00) (82 - 95)  BP: --  BP(mean): --  RR: 21 (18 @ 07:00) (15 - 26)  SpO2: 96% (18 @ 07:00) (93% - 100%)  Wt(kg): --  Daily     Daily Weight in k.9 (2018 04:00)  I&O's Summary    2018 07:01  -  2018 07:00  --------------------------------------------------------  IN: 1123 mL / OUT: 0 mL / NET: 1123 mL      Labs:                        7.5    9.4   )-----------( 192      ( 2018 05:04 )             21.3     02-    129<L>  |  89<L>  |  94<H>  ----------------------------<  107<H>  4.9   |  17<L>  |  8.28<H>    Ca    8.1<L>      2018 05:04  Phos  7.2       Mg     2.9         TPro  6.5  /  Alb  2.5<L>  /  TBili  0.4  /  DBili  x   /  AST  41<H>  /  ALT  22  /  AlkPhos  58      PT/INR - ( 2018 05:04 )   PT: 21.0 sec;   INR: 1.90 ratio         PTT - ( 2018 05:04 )  PTT:68.3 sec      Magnesium, Serum: 2.9 mg/dL ( @ 05:04)  Phosphorus Level, Serum: 7.2 mg/dL ( @ 05:04)      ECG: NSR @ 90 bpm. QTc 551. Flipped T-waves in V5-V6, unchanged from previous EKG.     Echo:    Cath:  atherectomy and RICHARDSON x 2 to pLAD (95%), RICHARDSON x 1 to DIONICIO (95%), POBA to D1 (90%)      Interpretation of Telemetry: Sinus      Physical Exam:  Appearance: [x ] Normal  [ ] abnormal [x ] NAD   Eyes: [x ] PERRL [x ] EOMI  HENT: [x ] Normal [ ] Abnormal oral muscosa [ ]NC/AT  Cardiovascular: [x ] S1 [x ] S2 [ x] RRR [ ] m/r/g [ ]edema [ ] JVP  +tunneled dialysis catheter site clean, dry, intact.   Procedural Access Site: Dressing in place, clean, dry, intact. No signs of hematoma. 2 sec capillary refill, hand warm. Respiratory: Coarse breath sounds B/L.   Gastrointestinal: [x ] Soft [ ] tenderness[ ] distension [ ] BS  Musculoskeletal: [ ] clubbing [ ] joint deformity   Neurologic: [x ] Non-focal  Lymphatic: [ ] lymphadenopathy  Psychiatry: [x ] AAOx3  [ ] confused [ ] disoriented [x ] Mood & affect appropriate  Skin: [ ]  rashes [ ] ecchymoses [ ] cyanosis Admission date:   Chief complaint: Shortness of breath   HPI:  This is a 77 year old man with past medical history of AAA 5.5cm s/p EVAAR repair with stents on  with incidental finding of Right Renal Neoplasm, previous history of Left Renal Neoplasm with Left Nephrectomy in , Bladder Cancer, Known LBBB, HTN, HLD transferred from [facility] after initially presenting with SOB which began last night. Patient states that he was discharged from the hospital following AAA repair and recovering well.  He was walking with walker at home with no complaints.  After going to bed, he was awoken from his sleep complaining of SOB with no relief.  He went to [facility] wtih his SOB getting progressively worse with 1 episode of vomitus before going to hospital.  Patient denies fever, chills, recent sick contact, Chest pain, Abdominal pain, diarrhea.  At [place], paient with elevate d-dimers and transferred for possible PE.  At [facility], patient with  RICKI in V2-V4 with elevated Trops, and ADHF requiring Bipap. (2018 17:51)      Interval history: Pt c/o difficulty breathing overnight with coarse breath sounds on exam, given Duonebs/Mucomyst with improvement.  Argatroban gtt restarted after radial band removed, and Coumadin 2mg started.  No other acute events.     ROS: Pt admits to shortness of breath, cough.  Pt denies HA, CP, palpitations, abd pain, N/V.       Allergy:  Cipro (Other)  heparin (Other (Severe))  Levaquin (Other)  penicillin (Hives)      Medications:  MEDICATIONS  (STANDING):  acetylcysteine 10% Inhalation 3 milliLiter(s) Inhalation every 6 hours  ALBUTerol/ipratropium for Nebulization 3 milliLiter(s) Nebulizer every 6 hours  argatroban Infusion 2 MICROgram(s)/kG/Min (8.16 mL/Hr) IV Continuous <Continuous>  aspirin enteric coated 81 milliGRAM(s) Oral daily  atorvastatin 80 milliGRAM(s) Oral at bedtime  clopidogrel Tablet 75 milliGRAM(s) Oral daily  docusate sodium 100 milliGRAM(s) Oral daily  hydrALAZINE 100 milliGRAM(s) Oral every 8 hours  isosorbide   dinitrate Tablet (ISORDIL) 20 milliGRAM(s) Oral three times a day  metoprolol succinate ER 50 milliGRAM(s) Oral daily  senna 1 Tablet(s) Oral at bedtime  sevelamer hydrochloride 1600 milliGRAM(s) Oral three times a day with meals    MEDICATIONS  (PRN):  polyethylene glycol 3350 17 Gram(s) Oral daily PRN Constipation      PMH/PSH/FH/SH: [x ] Unchanged    Vitals:  T(C): 36.2 (18 @ 07:00), Max: 36.7 (18 @ 13:00)  HR: 89 (18 @ 07:00) (82 - 95)  BP: --  BP(mean): --  RR: 21 (18 @ 07:00) (15 - 26)  SpO2: 96% (18 @ 07:00) (93% - 100%)  Wt(kg): --  Daily     Daily Weight in k.9 (2018 04:00)  I&O's Summary    2018 07:01  -  2018 07:00  --------------------------------------------------------  IN: 1123 mL / OUT: 0 mL / NET: 1123 mL      Labs:                        7.5    9.4   )-----------( 192      ( 2018 05:04 )             21.3     02-    129<L>  |  89<L>  |  94<H>  ----------------------------<  107<H>  4.9   |  17<L>  |  8.28<H>    Ca    8.1<L>      2018 05:04  Phos  7.2       Mg     2.9         TPro  6.5  /  Alb  2.5<L>  /  TBili  0.4  /  DBili  x   /  AST  41<H>  /  ALT  22  /  AlkPhos  58      PT/INR - ( 2018 05:04 )   PT: 21.0 sec;   INR: 1.90 ratio         PTT - ( 2018 05:04 )  PTT:68.3 sec      Magnesium, Serum: 2.9 mg/dL ( @ 05:04)  Phosphorus Level, Serum: 7.2 mg/dL ( @ 05:04)      ECG: NSR @ 90 bpm. QTc 551. Flipped T-waves in V5-V6, unchanged from previous EKG.     Echo:    Cath:  atherectomy and RICHARDSON x 2 to pLAD (95%), RICHARDSON x 1 to DIONICIO (95%), POBA to D1 (90%)      Interpretation of Telemetry: Sinus      Physical Exam:  Appearance: [x ] Normal  [ ] abnormal [ ] NAD [x] mildly anxious  Eyes: [x ] PERRL [x ] EOMI  HENT: [x ] Normal [ ] Abnormal oral muscosa [ ]NC/AT  Cardiovascular: [x ] S1 [x ] S2 [ x] RRR [ ] m/r/g [ ]edema [ ] JVP  +tunneled dialysis catheter site clean, dry, intact.   Procedural Access Site: Dressing in place, clean, dry, intact. No signs of hematoma. 2 sec capillary refill, hand warm. Respiratory: Coarse breath sounds B/L.   Gastrointestinal: [x ] Soft [ ] tenderness[ ] distension [ ] BS  Musculoskeletal: [ ] clubbing [ ] joint deformity   Neurologic: [x ] Non-focal  Lymphatic: [ ] lymphadenopathy  Psychiatry: [x ] AAOx2 (time) [x ] confused, requires redirection [ ] disoriented [ ] Mood & affect appropriate  Skin: [ ]  rashes [ ] ecchymoses [ ] cyanosis

## 2018-02-19 NOTE — PROGRESS NOTE ADULT - ASSESSMENT
Patient seen at dialysis, events noted. He developed tachyarrhythmia towards end of 2nd hour of hemodialysis and dialysis was terminated. He is on beta blocker.  I have seen the patient and reviewed dialysis prescription and flow sheet. Dialysis access is functioning well. Patient is tolerating dialysis well with no acute symptoms or distress. Dialysis prescription has been adjusted for optimized control of volume status, uremia and electrolytes. Management of additional metabolic abnormalities/anemia will continue to be addressed on follow up.  I discussed with covering team provider and also left message for Dr. Anderson, cardiologist.

## 2018-02-19 NOTE — PROGRESS NOTE ADULT - SUBJECTIVE AND OBJECTIVE BOX
[facility] DIVISION OF KIDNEY DISEASES AND HYPERTENSION -- FOLLOW UP NOTE/Hemodialysis  --------------------------------------------------------------------------------  Chief Complaint: DONNIE    24 hour events/subjective:  s/p PCI, stent placement        PAST HISTORY  --------------------------------------------------------------------------------  No significant changes to PMH, PSH, FHx, SHx, unless otherwise noted    ALLERGIES & MEDICATIONS  --------------------------------------------------------------------------------  Allergies    Cipro (Other)  heparin (Other (Severe))  Levaquin (Other)  penicillin (Hives)    Intolerances      MEDICATIONS  (STANDING):  acetylcysteine 10% Inhalation 3 milliLiter(s) Inhalation every 6 hours  ALBUTerol/ipratropium for Nebulization 3 milliLiter(s) Nebulizer every 6 hours  argatroban Infusion 2 MICROgram(s)/kG/Min (8.16 mL/Hr) IV Continuous <Continuous>  aspirin enteric coated 81 milliGRAM(s) Oral daily  atorvastatin 80 milliGRAM(s) Oral at bedtime  clopidogrel Tablet 75 milliGRAM(s) Oral daily  docusate sodium 100 milliGRAM(s) Oral daily  hydrALAZINE 100 milliGRAM(s) Oral every 8 hours  isosorbide   dinitrate Tablet (ISORDIL) 20 milliGRAM(s) Oral three times a day  senna 1 Tablet(s) Oral at bedtime  sevelamer hydrochloride 1600 milliGRAM(s) Oral three times a day with meals    MEDICATIONS  (PRN):  diazepam    Tablet 5 milliGRAM(s) Oral two times a day PRN anxiety  polyethylene glycol 3350 17 Gram(s) Oral daily PRN Constipation      REVIEW OF SYSTEMS  --------------------------------------------------------------------------------  Gen: No weight changes, fatigue, fevers/chills, weakness  Skin: No rashes  Head/Eyes/Ears/Mouth: No headache; Normal hearing; Normal vision w/o blurriness; No sinus pain/discomfort, sore throat  Respiratory: No dyspnea, cough, wheezing, hemoptysis  CV: No chest pain, PND, orthopnea  GI: No abdominal pain, diarrhea, constipation, nausea, vomiting, melena, hematochezia  : No increased frequency, dysuria, hematuria, nocturia  MSK: No joint pain/swelling; no back pain; no edema  Neuro: No dizziness/lightheadedness, weakness, seizures, numbness, tingling  Heme: No easy bruising or bleeding  Endo: No heat/cold intolerance  Psych: No significant nervousness, anxiety, stress, depression    All other systems were reviewed and are negative, except as noted.    Vital Signs Last 24 Hrs  T(C): 36.4 (02-19-18 @ 12:45)  T(F): 97.5 (02-19-18 @ 12:45), Max: 98 (02-18-18 @ 22:30)  HR: 127 (02-19-18 @ 14:00) (82 - 127)  BP: 84/60 (02-19-18 @ 13:00)  BP(mean): 68 (02-19-18 @ 13:00) (68 - 68)  RR: 18 (02-19-18 @ 14:00) (14 - 26)  SpO2: 99% (02-19-18 @ 14:00) (91% - 100%)  Wt(kg): --    02-18 @ 07:01  -  02-19 @ 07:00  --------------------------------------------------------  IN: 1123 mL / OUT: 0 mL / NET: 1123 mL    02-19 @ 07:01  -  02-19 @ 15:08  --------------------------------------------------------  IN: 529.2 mL / OUT: 950 mL / NET: -420.8 mL      Physical Exam:  	Gen: NAD  	Pulm: b/l crackles  	CV: RRR, S1S2; no rub  	Abd: +BS, soft, nontender/nondistended  	: No suprapubic tenderness  	UE: Warm,  no edema; no asterixis  	LE: Warm,  no edema  	Skin: Warm,  	Vascular access: RIJ tunneled cath    LABS/STUDIES  --------------------------------------------------------------------------------              7.5    9.4   >-----------<  192      [02-19-18 @ 05:04]              21.3     129  |  89  |  94  ----------------------------<  107      [02-19-18 @ 05:04]  4.9   |  17  |  8.28        Ca     8.1     [02-19-18 @ 05:04]      Mg     2.9     [02-19-18 @ 05:04]      Phos  7.2     [02-19-18 @ 05:04]    TPro  6.5  /  Alb  2.5  /  TBili  0.4  /  DBili  x   /  AST  41  /  ALT  22  /  AlkPhos  58  [02-19-18 @ 05:04]    PT/INR: PT 21.0 , INR 1.90       [02-19-18 @ 05:04]  PTT: 68.3       [02-19-18 @ 05:04]      Creatinine Trend:  SCr 8.28 [02-19 @ 05:04]  SCr 6.83 [02-18 @ 05:43]  SCr 4.82 [02-17 @ 03:16]  SCr 6.21 [02-16 @ 04:06]  SCr 5.76 [02-15 @ 04:47]

## 2018-02-19 NOTE — PROGRESS NOTE ADULT - ASSESSMENT
HPI:  This is a 77 year old man with past medical history of AAA 5.5cm s/p EVAAR repair with stents on 1/29 with incidental finding of Right Renal Neoplasm, previous history of Left Renal Neoplasm with Left Nephrectomy in 2008, Bladder Cancer, Known LBBB, HTN, HLD transferred from [facility] after initially presenting with SOB which began last night. Patient states that he was discharged from the hospital following AAA repair and recovering well.  He was walking with walker at home with no complaints.  After going to bed, he was awoken from his sleep complaining of SOB with no relief.  He went to [facility] wtih his SOB getting progressively worse with 1 episode of vomitus before going to hospital.  Patient denies fever, chills, recent sick contact, Chest pain, Abdominal pain, diarrhea.  At [place], paient with elevate d-dimers and transferred for possible PE.  At [facility], patient with  RICKI in V2-V4 with elevated Trops, and ADHF requiring Bipap. (04 Feb 2018 17:51)

## 2018-02-19 NOTE — CHART NOTE - NSCHARTNOTEFT_GEN_A_CORE
Vascular Surgery Event/Task Note:    Examined patient in PICU; he previously had EVAR performed on 1/29/18; removed staples at bedside in bilateral groin incision sites (15 staples removed from L, 13 from Right).  On the right side, the patient had angiogram performed by Dr. Heriberto Garg on 2/4/18; appears that he entered through the same incision site on the right groin as the EVAR scar; the remaining 2 staples on the inferior portion appear to be from the angiogram (Patient's daughter is at bedside and states that Dr. Garg used the same site and re-stapled it, as best as she knows).  Discussed with family, patient and the RN that the remaining 2 staples will be left in the site until Dr. Garg assesses and removes them as they appear to have been placed by his service and not the Vascular Team.  If he would like us to remove them please let us know.    Thank you.  Vascular Surgery Pgr #[id number]

## 2018-02-19 NOTE — PROGRESS NOTE ADULT - PROBLEM SELECTOR PLAN 1
Patient with DONNIE on CKD in setting of significant renal artery occlusion: Baseline creatinine is between 1.2-1.3. Scr. on presentation was 6.48. Complements noted to be WNL.  Patient was initiated on CVVHDF on 2/5/18 and transitioned to IHD. Patient is currently dialysis dependent. s/p tunneled HD catheter placement.   Plan for HD today  Monitor BMP daily.

## 2018-02-20 DIAGNOSIS — F05 DELIRIUM DUE TO KNOWN PHYSIOLOGICAL CONDITION: ICD-10-CM

## 2018-02-20 LAB
ALBUMIN SERPL ELPH-MCNC: 2.8 G/DL — LOW (ref 3.3–5)
ALP SERPL-CCNC: 59 U/L — SIGNIFICANT CHANGE UP (ref 40–120)
ALT FLD-CCNC: 23 U/L RC — SIGNIFICANT CHANGE UP (ref 10–45)
ANION GAP SERPL CALC-SCNC: 19 MMOL/L — HIGH (ref 5–17)
APTT BLD: 55 SEC — HIGH (ref 27.5–37.4)
AST SERPL-CCNC: 37 U/L — SIGNIFICANT CHANGE UP (ref 10–40)
BILIRUB SERPL-MCNC: 0.4 MG/DL — SIGNIFICANT CHANGE UP (ref 0.2–1.2)
BUN SERPL-MCNC: 68 MG/DL — HIGH (ref 7–23)
CALCIUM SERPL-MCNC: 8.5 MG/DL — SIGNIFICANT CHANGE UP (ref 8.4–10.5)
CHLORIDE SERPL-SCNC: 94 MMOL/L — LOW (ref 96–108)
CO2 SERPL-SCNC: 22 MMOL/L — SIGNIFICANT CHANGE UP (ref 22–31)
CREAT SERPL-MCNC: 7.03 MG/DL — HIGH (ref 0.5–1.3)
GLUCOSE SERPL-MCNC: 95 MG/DL — SIGNIFICANT CHANGE UP (ref 70–99)
HCT VFR BLD CALC: 21.9 % — LOW (ref 39–50)
HGB BLD-MCNC: 7.3 G/DL — LOW (ref 13–17)
INR BLD: 1.76 RATIO — HIGH (ref 0.88–1.16)
MAGNESIUM SERPL-MCNC: 2.6 MG/DL — SIGNIFICANT CHANGE UP (ref 1.6–2.6)
MCHC RBC-ENTMCNC: 31 PG — SIGNIFICANT CHANGE UP (ref 27–34)
MCHC RBC-ENTMCNC: 33.3 GM/DL — SIGNIFICANT CHANGE UP (ref 32–36)
MCV RBC AUTO: 93.1 FL — SIGNIFICANT CHANGE UP (ref 80–100)
PHOSPHATE SERPL-MCNC: 5.6 MG/DL — HIGH (ref 2.5–4.5)
PLATELET # BLD AUTO: 209 K/UL — SIGNIFICANT CHANGE UP (ref 150–400)
POTASSIUM SERPL-MCNC: 4.4 MMOL/L — SIGNIFICANT CHANGE UP (ref 3.5–5.3)
POTASSIUM SERPL-SCNC: 4.4 MMOL/L — SIGNIFICANT CHANGE UP (ref 3.5–5.3)
PROT SERPL-MCNC: 6.8 G/DL — SIGNIFICANT CHANGE UP (ref 6–8.3)
PROTHROM AB SERPL-ACNC: 19.2 SEC — HIGH (ref 9.8–12.7)
RBC # BLD: 2.35 M/UL — LOW (ref 4.2–5.8)
RBC # FLD: 12.2 % — SIGNIFICANT CHANGE UP (ref 10.3–14.5)
SODIUM SERPL-SCNC: 135 MMOL/L — SIGNIFICANT CHANGE UP (ref 135–145)
WBC # BLD: 8.5 K/UL — SIGNIFICANT CHANGE UP (ref 3.8–10.5)
WBC # FLD AUTO: 8.5 K/UL — SIGNIFICANT CHANGE UP (ref 3.8–10.5)

## 2018-02-20 PROCEDURE — 99233 SBSQ HOSP IP/OBS HIGH 50: CPT | Mod: GC

## 2018-02-20 PROCEDURE — 99291 CRITICAL CARE FIRST HOUR: CPT

## 2018-02-20 PROCEDURE — 93010 ELECTROCARDIOGRAM REPORT: CPT

## 2018-02-20 PROCEDURE — 99223 1ST HOSP IP/OBS HIGH 75: CPT

## 2018-02-20 RX ORDER — WARFARIN SODIUM 2.5 MG/1
3 TABLET ORAL ONCE
Qty: 0 | Refills: 0 | Status: COMPLETED | OUTPATIENT
Start: 2018-02-20 | End: 2018-02-20

## 2018-02-20 RX ORDER — DIAZEPAM 5 MG
2.5 TABLET ORAL DAILY
Qty: 0 | Refills: 0 | Status: DISCONTINUED | OUTPATIENT
Start: 2018-02-20 | End: 2018-02-24

## 2018-02-20 RX ORDER — DIVALPROEX SODIUM 500 MG/1
250 TABLET, DELAYED RELEASE ORAL AT BEDTIME
Qty: 0 | Refills: 0 | Status: DISCONTINUED | OUTPATIENT
Start: 2018-02-20 | End: 2018-02-20

## 2018-02-20 RX ORDER — ALUMINUM HYDROXIDE
30 POWDER (GRAM) MISCELLANEOUS ONCE
Qty: 0 | Refills: 0 | Status: DISCONTINUED | OUTPATIENT
Start: 2018-02-20 | End: 2018-02-20

## 2018-02-20 RX ADMIN — ISOSORBIDE DINITRATE 20 MILLIGRAM(S): 5 TABLET ORAL at 06:46

## 2018-02-20 RX ADMIN — Medication 3 MILLILITER(S): at 06:14

## 2018-02-20 RX ADMIN — Medication 100 MILLIGRAM(S): at 11:13

## 2018-02-20 RX ADMIN — ISOSORBIDE DINITRATE 20 MILLIGRAM(S): 5 TABLET ORAL at 22:13

## 2018-02-20 RX ADMIN — Medication 3 MILLILITER(S): at 23:36

## 2018-02-20 RX ADMIN — Medication 3 MILLILITER(S): at 02:56

## 2018-02-20 RX ADMIN — WARFARIN SODIUM 3 MILLIGRAM(S): 2.5 TABLET ORAL at 22:14

## 2018-02-20 RX ADMIN — SEVELAMER CARBONATE 1600 MILLIGRAM(S): 2400 POWDER, FOR SUSPENSION ORAL at 22:11

## 2018-02-20 RX ADMIN — SENNA PLUS 1 TABLET(S): 8.6 TABLET ORAL at 22:14

## 2018-02-20 RX ADMIN — Medication 100 MILLIGRAM(S): at 22:13

## 2018-02-20 RX ADMIN — CLOPIDOGREL BISULFATE 75 MILLIGRAM(S): 75 TABLET, FILM COATED ORAL at 11:13

## 2018-02-20 RX ADMIN — Medication 100 MILLIGRAM(S): at 06:45

## 2018-02-20 RX ADMIN — SEVELAMER CARBONATE 1600 MILLIGRAM(S): 2400 POWDER, FOR SUSPENSION ORAL at 13:32

## 2018-02-20 RX ADMIN — Medication 75 MILLIGRAM(S): at 11:13

## 2018-02-20 RX ADMIN — ATORVASTATIN CALCIUM 80 MILLIGRAM(S): 80 TABLET, FILM COATED ORAL at 22:13

## 2018-02-20 RX ADMIN — Medication 81 MILLIGRAM(S): at 11:13

## 2018-02-20 RX ADMIN — SEVELAMER CARBONATE 1600 MILLIGRAM(S): 2400 POWDER, FOR SUSPENSION ORAL at 11:12

## 2018-02-20 NOTE — BEHAVIORAL HEALTH ASSESSMENT NOTE - NSBHCHARTREVIEWVS_PSY_A_CORE FT
T(C): 37 (02-20-18 @ 05:00), Max: 37 (02-20-18 @ 05:00)  HR: 90 (02-20-18 @ 14:17) (87 - 101)  BP: --  RR: 21 (02-20-18 @ 14:17) (10 - 27)  SpO2: 100% (02-20-18 @ 14:17) (95% - 100%)  Wt(kg): --

## 2018-02-20 NOTE — PROGRESS NOTE ADULT - ASSESSMENT
Problem/Plan:  Problem: DONNIE: Patient with DONNIE on CKD in setting of significant renal artery occlusion: Baseline creatinine is between 1.2-1.3. Scr. on presentation was 6.48.  Patient was initiated on CVVHDF on 2/5/18 and transitioned to IHD. Patient is currently dialysis dependent. s/p tunneled HD catheter placement. Monitor BMP daily. F/U with nephrology regarding shortened HD yesterday, increasing Cr, possible HD today.   Problem: NSTEMI (non-ST elevated myocardial infarction). Plan: -PCI 2/18, RICHARDSON x2 pLAD (95%), RICHARDSON x1 to OM1 (90%), POBA D1 (90%). c/w plavix, asa.     Problem: HIT (heparin-induced thrombocytopenia).  Plan: C/W argatroban will bridge to coumadin post CC.  Platelets have normalized.     Problem: AF (atrial Fibrillation). Plan: HIT, pt on argatroban. Bridge to coumadin. INR: 1.76 (2/20), increase Coumadin tonight to 3mg x 1, then change to 2.5mg.     Problem: Hyperkalemia.  Plan: Serum potassium is 4.4 today. Monitor serum K level.    Problem: Hyperphosphatemia.  Plan: In setting of renal failure: Serum phosphorus is 5.3 today. C/w renagel 1600 mg TID with meals. Monitor serum phosphorus level.     Problem: Abdominal aortic aneurysm (AAA) without rupture.  Plan: Stable,   Vascular surgery D/C staples yesterday.     Problem: Confusion/Agitation. Plan: cosult psychiatry for delerium vs depression. recommendations to decrease valium to 1x daily as needed, and add depakote 250mg PO at bedtime, 125mg breakthrough delirium.

## 2018-02-20 NOTE — PROGRESS NOTE ADULT - PROBLEM SELECTOR PLAN 2
Patient's volume status seems to be improved but still has evidence of pulmonary edema on examination. Will arrange for HD/UF today.

## 2018-02-20 NOTE — BEHAVIORAL HEALTH ASSESSMENT NOTE - HPI (INCLUDE ILLNESS QUALITY, SEVERITY, DURATION, TIMING, CONTEXT, MODIFYING FACTORS, ASSOCIATED SIGNS AND SYMPTOMS)
The patient is a 77 year old  male who works as a ,  with 3 adult children, domiciled with his girlfriend, with no previous psychiatric history who is currently admitted due to NSTEMI, acute decompensated heart failure, and newly placed on dialysis following AAA repair in late January. Psychiatry was consulted due to confusion and agitation.     The patient was found resting in bed and was AAOx1 (could not recall date or hospital name). His level of alertness was waxing and waning throughout the interview. Currently he states that he feels unwell, has been noticing some difficulty with his memory over the course of the last few days. He has also felt confused and disoriented at times. He was unable to recall the name of his girlfriend that he lives with, and he could not recall the number of grandchildren he has. Could not name the resident.  He has not felt well physically and it has made him upset during his hospitalization. There are periods where he feels anxious and he feels like he cannot breathe. He takes Valium at home under his medical doctor’s supervision up to twice a day as needed. He denies periods of hopelessness or depression. He denies suicidal or homicidal ideation.  When asked if he has heard any extraneous voices or seen any hallucinations since being in the hospital, he replied “probably”, but does not hear or see anything at the moment. No symptoms of claire noted. He does not have access to firearms.  Per RN, has been intermittently agitated.    Collateral history was obtained from the patient’s sister and brother in law at bedside. They state that they began to notice changes in the patient’s memory and concentration approximately a week ago which has worsened as the hospitalization has progressed. According to them, he becomes agitated and confused at times, and he will occasionally yell. These symptoms seem to be worse at night.  Prior to his hospitalization, they have noted an increase in the patient’s forgetfulness over the last year. He will occasionally forget conversations that have taken place. However, this has not majorly affected him as he has remained independent and has continued to work throughout this time, drives, manages his own finances. They also mentioned briefly that the patient has complained of episodes of vertigo for the last few months.  They deny pt having a h/o mental health or substance abuse issues.

## 2018-02-20 NOTE — BEHAVIORAL HEALTH ASSESSMENT NOTE - NSBHCONSULTMEDS_PSY_A_CORE FT
1. Start Depakote 250mg PO qHS    2. PRN: Depacon 125mg IV q8h PRN agitation (QTc is prolonged).  Ativan 1mg IV q6h PRN severe agitation (advise judicious use as may worsen delirium).  Decrease Valium to 5mg PO daily PRN anxiety.    3. Check: TSH, B12, folate, RPR.  Check CT head.    4. Minimize use of benzos, opioids, anticholinergics, or other deliriogenic agents when possible.  Maintain sleep wake cycle.  Provide frequent reorientation and redirection.  Family member at bedside if possible. Assess for need for glasses and hearing aid (if applicable).    5. Pt does not meet criteria for psychiatric hospitalization.  Recommend outpt psych f/u at [facility] after d/c- [phone].   [facility] Psych will follow.

## 2018-02-20 NOTE — BEHAVIORAL HEALTH ASSESSMENT NOTE - NSBHCHARTREVIEWINVESTIGATE_PSY_A_CORE FT
< from: 12 Lead ECG (02.19.18 @ 02:40) >      Ventricular Rate 91 BPM    Atrial Rate 91 BPM    P-R Interval 170 ms    QRS Duration 142 ms     ms    QTc 551 ms    P Axis 69 degrees    R Axis 87 degrees    T Axis 107 degrees    Diagnosis Line NORMAL SINUS RHYTHM  POSSIBLE LEFT ATRIAL ENLARGEMENT  NON-SPECIFIC INTRA-VENTRICULAR CONDUCTION BLOCK  CANNOT RULE OUT SEPTAL INFARCT , AGE UNDETERMINED  LATERAL INFARCT , AGE UNDETERMINED  ABNORMAL ECG    < end of copied text >

## 2018-02-20 NOTE — BEHAVIORAL HEALTH ASSESSMENT NOTE - NS ED [facility] MED ROS GENITOURINARY
no spinal tend, no rash, no lesions, + R para lumbar tend to palp, FROM, neg straight leg raise b/l, muscle strength 5/5 b/l LE, good resistance b/l
No complaints

## 2018-02-20 NOTE — BEHAVIORAL HEALTH ASSESSMENT NOTE - NSBHCHARTREVIEWLAB_PSY_A_CORE FT
7.3    8.5   )-----------( 209      ( 20 Feb 2018 04:37 )             21.9     02-20    135  |  94<L>  |  68<H>  ----------------------------<  95  4.4   |  22  |  7.03<H>    Ca    8.5      20 Feb 2018 04:37  Phos  5.6     02-20  Mg     2.6     02-20    TPro  6.8  /  Alb  2.8<L>  /  TBili  0.4  /  DBili  x   /  AST  37  /  ALT  23  /  AlkPhos  59  02-20

## 2018-02-20 NOTE — PROGRESS NOTE ADULT - SUBJECTIVE AND OBJECTIVE BOX
[facility] DIVISION OF KIDNEY DISEASES AND HYPERTENSION --    24 hour events/subjective:  PT with sister bedside  Has some sob      PAST HISTORY  --------------------------------------------------------------------------------  No significant changes to PMH, PSH, FHx, SHx, unless otherwise noted    ALLERGIES & MEDICATIONS  --------------------------------------------------------------------------------  Allergies    Cipro (Other)  heparin (Other (Severe))  Levaquin (Other)  penicillin (Hives)    Intolerances      Standing Inpatient Medications  acetylcysteine 10% Inhalation 3 milliLiter(s) Inhalation every 6 hours  ALBUTerol/ipratropium for Nebulization 3 milliLiter(s) Nebulizer every 6 hours  aluminum hydroxide Suspension 30 milliLiter(s) Oral once  argatroban Infusion 2 MICROgram(s)/kG/Min IV Continuous <Continuous>  aspirin enteric coated 81 milliGRAM(s) Oral daily  atorvastatin 80 milliGRAM(s) Oral at bedtime  clopidogrel Tablet 75 milliGRAM(s) Oral daily  diVALproex Sprinkle 250 milliGRAM(s) Oral at bedtime  docusate sodium 100 milliGRAM(s) Oral daily  hydrALAZINE 100 milliGRAM(s) Oral every 8 hours  isosorbide   dinitrate Tablet (ISORDIL) 20 milliGRAM(s) Oral three times a day  metoprolol succinate ER 75 milliGRAM(s) Oral daily  senna 1 Tablet(s) Oral at bedtime  sevelamer hydrochloride 1600 milliGRAM(s) Oral three times a day with meals  warfarin 3 milliGRAM(s) Oral once    PRN Inpatient Medications  diazepam    Tablet 2.5 milliGRAM(s) Oral daily PRN  polyethylene glycol 3350 17 Gram(s) Oral daily PRN      REVIEW OF SYSTEMS  --------------------------------------------------------------------------------  Gen: No weight changes, fatigue, fevers/chills, weakness  Skin: No rashes  Respiratory: No dyspnea, cough, wheezing, hemoptysis  CV: No chest pain, PND, orthopnea  GI: No abdominal pain, diarrhea, constipation, nausea, vomiting, melena, hematochezia  : No increased frequency, dysuria, hematuria, nocturia  Neuro: No dizziness/lightheadedness    All other systems were reviewed and are negative, except as noted.    VITALS/PHYSICAL EXAM  --------------------------------------------------------------------------------  T(C): 37 (02-20-18 @ 18:15), Max: 37 (02-20-18 @ 05:00)  HR: 92 (02-20-18 @ 18:15) (87 - 101)  BP: --  RR: 19 (02-20-18 @ 18:15) (10 - 27)  SpO2: 99% (02-20-18 @ 18:15) (96% - 100%)  Wt(kg): --        02-19-18 @ 07:01  -  02-20-18 @ 07:00  --------------------------------------------------------  IN: 1178.6 mL / OUT: 950 mL / NET: 228.6 mL    02-20-18 @ 07:01  -  02-20-18 @ 18:49  --------------------------------------------------------  IN: 698.4 mL / OUT: 0 mL / NET: 698.4 mL      Physical Exam:  	Gen: NAD  	Pulm: crackles bilaterally  	CV: RRR, S1S2; no rub  	Back: No spinal or CVA tenderness; no sacral edema  	Abd: +BS, soft, nontender/nondistended  	: No suprapubic tenderness  	LE: Warm, FROM, no clubbing, intact strength; no edema  	Skin: Warm, without rashes  	Vascular access: catheter    LABS/STUDIES  --------------------------------------------------------------------------------              7.3    8.5   >-----------<  209      [02-20-18 @ 04:37]              21.9     135  |  94  |  68  ----------------------------<  95      [02-20-18 @ 04:37]  4.4   |  22  |  7.03        Ca     8.5     [02-20-18 @ 04:37]      Mg     2.6     [02-20-18 @ 04:37]      Phos  5.6     [02-20-18 @ 04:37]    TPro  6.8  /  Alb  2.8  /  TBili  0.4  /  DBili  x   /  AST  37  /  ALT  23  /  AlkPhos  59  [02-20-18 @ 04:37]    PT/INR: PT 19.2 , INR 1.76       [02-20-18 @ 04:37]  PTT: 55.0       [02-20-18 @ 04:37]      Creatinine Trend:  SCr 7.03 [02-20 @ 04:37]  SCr 5.75 [02-19 @ 15:22]  SCr 8.28 [02-19 @ 05:04]  SCr 6.83 [02-18 @ 05:43]  SCr 4.82 [02-17 @ 03:16]    Urinalysis - [02-04-18 @ 13:17]      Color Yellow / Appearance Clear / SG 1.020 / pH 5.0      Gluc 50 / Ketone Negative  / Bili Negative / Urobili Negative       Blood Trace / Protein 30 / Leuk Est Trace / Nitrite Negative      RBC 0-2 / WBC 0-2 / Hyaline  / Gran  / Sq Epi  / Non Sq Epi Few / Bacteria Trace      HbA1c 5.4      [02-04-18 @ 21:37]  TSH 3.08      [02-04-18 @ 21:37]  Lipid: chol 145, , HDL 26, LDL 87      [02-04-18 @ 21:37]    HBsAb <3.0      [02-07-18 @ 23:02]  HBsAg Nonreact      [02-07-18 @ 23:02]  HCV 0.11, Nonreact      [02-07-18 @ 23:02]    C3 Complement 157      [02-05-18 @ 21:15]  C4 Complement 35      [02-05-18 @ 21:15]

## 2018-02-20 NOTE — PROGRESS NOTE ADULT - SUBJECTIVE AND OBJECTIVE BOX
[facility] Division of Kidney Diseases & Hypertension  FOLLOW UP NOTE  [phone]--------------------------------------------------------------------------------  Chief Complaint:Left heart failure      24 hour events/subjective:    Patient had HD treatment yesterday but was cut short due to Aliang  Currently on NC; still feels short of breath  Denies c/o CP, abdominal pain, nausea, vomiting.     PAST HISTORY  --------------------------------------------------------------------------------  No significant changes to PMH, PSH, FHx, SHx, unless otherwise noted    ALLERGIES & MEDICATIONS  --------------------------------------------------------------------------------  Allergies    Cipro (Other)  heparin (Other (Severe))  Levaquin (Other)  penicillin (Hives)    Intolerances      Standing Inpatient Medications  acetylcysteine 10% Inhalation 3 milliLiter(s) Inhalation every 6 hours  ALBUTerol/ipratropium for Nebulization 3 milliLiter(s) Nebulizer every 6 hours  aluminum hydroxide Suspension 30 milliLiter(s) Oral once  argatroban Infusion 2 MICROgram(s)/kG/Min IV Continuous <Continuous>  aspirin enteric coated 81 milliGRAM(s) Oral daily  atorvastatin 80 milliGRAM(s) Oral at bedtime  clopidogrel Tablet 75 milliGRAM(s) Oral daily  diVALproex Sprinkle 250 milliGRAM(s) Oral at bedtime  docusate sodium 100 milliGRAM(s) Oral daily  hydrALAZINE 100 milliGRAM(s) Oral every 8 hours  isosorbide   dinitrate Tablet (ISORDIL) 20 milliGRAM(s) Oral three times a day  metoprolol succinate ER 75 milliGRAM(s) Oral daily  senna 1 Tablet(s) Oral at bedtime  sevelamer hydrochloride 1600 milliGRAM(s) Oral three times a day with meals  warfarin 3 milliGRAM(s) Oral once    PRN Inpatient Medications  diazepam    Tablet 2.5 milliGRAM(s) Oral daily PRN  polyethylene glycol 3350 17 Gram(s) Oral daily PRN      REVIEW OF SYSTEMS  --------------------------------------------------------------------------------  Gen: No  fevers/chills  Skin: No rashes  Head/Eyes/Ears/Mouth: No headache; Normal hearing; Normal vision w/o blurriness  Respiratory: No dyspnea, cough, wheezing, hemoptysis  CV: No chest pain, PND, orthopnea  GI: No abdominal pain, diarrhea, constipation, nausea, vomiting  : No increased frequency, dysuria, hematuria, nocturia  MSK: No joint pain/swelling; no back pain; no edema  Neuro: No dizziness/lightheadedness, weakness, seizures, numbness, tingling      All other systems were reviewed and are negative, except as noted.    VITALS/PHYSICAL EXAM  --------------------------------------------------------------------------------  T(C): 37 (02-20-18 @ 18:15), Max: 37 (02-20-18 @ 05:00)  HR: 92 (02-20-18 @ 18:15) (87 - 101)  BP: --  RR: 19 (02-20-18 @ 18:15) (10 - 27)  SpO2: 99% (02-20-18 @ 18:15) (96% - 100%)  Wt(kg): --        02-19-18 @ 07:01  -  02-20-18 @ 07:00  --------------------------------------------------------  IN: 1178.6 mL / OUT: 950 mL / NET: 228.6 mL    02-20-18 @ 07:01  -  02-20-18 @ 18:43  --------------------------------------------------------  IN: 698.4 mL / OUT: 0 mL / NET: 698.4 mL      Physical Exam:  	  Gen: NAD  	Pulm: b/l crackles  	CV: RRR, S1S2; no rub  	Abd: +BS, soft, nontender/nondistended  	: No suprapubic tenderness  	UE: Warm,  no edema; no asterixis  	LE: Warm,  no edema  	Skin: Warm,  	Vascular access: RIJ tunneled cath      LABS/STUDIES  --------------------------------------------------------------------------------              7.3    8.5   >-----------<  209      [02-20-18 @ 04:37]              21.9     135  |  94  |  68  ----------------------------<  95      [02-20-18 @ 04:37]  4.4   |  22  |  7.03        Ca     8.5     [02-20-18 @ 04:37]      Mg     2.6     [02-20-18 @ 04:37]      Phos  5.6     [02-20-18 @ 04:37]    TPro  6.8  /  Alb  2.8  /  TBili  0.4  /  DBili  x   /  AST  37  /  ALT  23  /  AlkPhos  59  [02-20-18 @ 04:37]    PT/INR: PT 19.2 , INR 1.76       [02-20-18 @ 04:37]  PTT: 55.0       [02-20-18 @ 04:37]      Creatinine Trend:  SCr 7.03 [02-20 @ 04:37]  SCr 5.75 [02-19 @ 15:22]  SCr 8.28 [02-19 @ 05:04]  SCr 6.83 [02-18 @ 05:43]  SCr 4.82 [02-17 @ 03:16]

## 2018-02-20 NOTE — CHART NOTE - NSCHARTNOTEFT_GEN_A_CORE
====================  CCU MIDNIGHT ROUNDS  ====================    DEUCE BALL  [id number]    ====================  SUMMARY:    77 year old man with past medical history of AAA 5.5cm s/p EVAAR repair with stents on 1/29 with incidental finding of Right Renal Neoplasm, previous history of Left Renal Neoplasm with Left Nephrectomy in 2008, Bladder Cancer, Known LBBB, HTN, HLD transferred from [facility] after initially presenting with SOB which began last night. Patient states that he was discharged from the hospital following AAA repair and recovering well.   After going to bed, he was awoken from his sleep complaining of SOB with no relief.  He went to [facility] with his SOB getting progressively worse with 1 episode of vomitus before going to hospital.  At [place], paient with elevate d-dimers and transferred for possible PE.  At [facility], patient with  RICKI in V2-V4 with elevated Trops, and ADHF requiring Bipap. (04 Feb 2018 17:51). Pt found to have renal artery stenosis secondary to partial occlusion of renal artery from recent EVAR. Pt also in DONNIE. During work-up a cardiac cath revealed triple vessel disease and pt needed to be started on HD of renal failure. It was deemed to high risk for a renal artery bypass so an attempt was made to stent the renal artery. This was unsuccessful. Pt underwent high risk PCI w/ RICHARDSON LAD, OM1, D1.   ====================    ====================  NEW EVENTS: No acute events. Tolerated HD well.   ====================    ====================  VITALS (Last 12 hrs):  ====================  T(C): 36.4 (02-20-18 @ 21:45), Max: 37 (02-20-18 @ 18:00)  HR: 95 (02-20-18 @ 22:00) (89 - 101)  BP: 134/71 (02-20-18 @ 19:45) (134/71 - 134/71)  BP(mean): 90 (02-20-18 @ 19:45) (90 - 90)  ABP: 135/63 (02-20-18 @ 22:00) (120/58 - 148/66)  ABP(mean): 84 (02-20-18 @ 22:00) (75 - 97)  RR: 18 (02-20-18 @ 22:00) (12 - 22)  SpO2: 100% (02-20-18 @ 22:00) (98% - 100%)    TELEMETRY:     I&O's Summary    19 Feb 2018 07:01  -  20 Feb 2018 07:00  --------------------------------------------------------  IN: 1178.6 mL / OUT: 950 mL / NET: 228.6 mL    20 Feb 2018 07:01  -  20 Feb 2018 22:28  --------------------------------------------------------  IN: 1631.2 mL / OUT: 2900 mL / NET: -1268.8 mL    ====================  PLAN:  ====================  ICU delirium: Psych evaluated pt and recommended depakote for nighttime. Family concerned about depakote so it was discontinued. Will give pt's valium at night. Pt more alert today than yesterday.   Pulmonary congestion: resolving w/ daily HD  TVD s/p high risk PCI c/b HFrEF: Continue ASA, plavix, lipitor, hydral, isordil, toprol. Discontinue A-line. Will attempt to place PIVs for central line removal.  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 HD prn  AAA s/p EVAR: B/L groin incisions C/D/I   Dispo: Continue daily PT, OOB.       Erica Billingsley, AMANDAU NP   #

## 2018-02-20 NOTE — BEHAVIORAL HEALTH ASSESSMENT NOTE - DESCRIPTION
AAA 5.5cm s/p EVAAR repair with stents on 1/29 with incidental finding of Right Renal Neoplasm, previous history of Left Renal Neoplasm with Left Nephrectomy in 2008, Bladder Cancer, Known LBBB, HTN, HLD

## 2018-02-20 NOTE — PROGRESS NOTE ADULT - PROBLEM SELECTOR PLAN 3
In setting of renal failure: Serum phosphorus is 5.6 today. C/w renagel 1600 mg TID with meals. Monitor serum phosphorus level.

## 2018-02-20 NOTE — PROGRESS NOTE ADULT - SUBJECTIVE AND OBJECTIVE BOX
Patient is a 77y old  Male who presents with a chief complaint of Shortness of Breath (04 Feb 2018 17:51)    HPI:  This is a 77 year old man with past medical history of AAA 5.5cm s/p EVAAR repair with stents on 1/29 with incidental finding of Right Renal Neoplasm, previous history of Left Renal Neoplasm with Left Nephrectomy in 2008, Bladder Cancer, Known LBBB, HTN, HLD transferred from [facility] after initially presenting with SOB which began last night. Patient states that he was discharged from the hospital following AAA repair and recovering well.  He was walking with walker at home with no complaints.  After going to bed, he was awoken from his sleep complaining of SOB with no relief.  He went to [facility] wtih his SOB getting progressively worse with 1 episode of vomitus before going to hospital.  Patient denies fever, chills, recent sick contact, Chest pain, Abdominal pain, diarrhea.  At [place], paient with elevate d-dimers and transferred for possible PE.  At [facility], patient with  RICKI in V2-V4 with elevated Trops, and ADHF requiring Bipap. (04 Feb 2018 17:51)    Overnight Event:    REVIEW OF SYSTEMS:  	    MEDICATIONS  (STANDING):  acetylcysteine 10% Inhalation 3 milliLiter(s) Inhalation every 6 hours  ALBUTerol/ipratropium for Nebulization 3 milliLiter(s) Nebulizer every 6 hours  argatroban Infusion 2 MICROgram(s)/kG/Min (8.16 mL/Hr) IV Continuous <Continuous>  aspirin enteric coated 81 milliGRAM(s) Oral daily  atorvastatin 80 milliGRAM(s) Oral at bedtime  clopidogrel Tablet 75 milliGRAM(s) Oral daily  docusate sodium 100 milliGRAM(s) Oral daily  hydrALAZINE 100 milliGRAM(s) Oral every 8 hours  isosorbide   dinitrate Tablet (ISORDIL) 20 milliGRAM(s) Oral three times a day  metoprolol succinate ER 75 milliGRAM(s) Oral daily  senna 1 Tablet(s) Oral at bedtime  sevelamer hydrochloride 1600 milliGRAM(s) Oral three times a day with meals    MEDICATIONS  (PRN):  diazepam    Tablet 5 milliGRAM(s) Oral two times a day PRN anxiety  polyethylene glycol 3350 17 Gram(s) Oral daily PRN Constipation        PHYSICAL EXAM:  Vital Signs Last 24 Hrs  T(C): 37 (20 Feb 2018 05:00), Max: 37 (20 Feb 2018 05:00)  T(F): 98.6 (20 Feb 2018 05:00), Max: 98.6 (20 Feb 2018 05:00)  HR: 96 (20 Feb 2018 07:00) (83 - 131)  BP: 84/60 (19 Feb 2018 13:00) (84/60 - 84/60)  BP(mean): 68 (19 Feb 2018 13:00) (68 - 68)  RR: 15 (20 Feb 2018 07:00) (10 - 27)  SpO2: 100% (20 Feb 2018 07:00) (91% - 100%)  I&O's Summary    19 Feb 2018 07:01  -  20 Feb 2018 07:00  --------------------------------------------------------  IN: 1178.6 mL / OUT: 950 mL / NET: 228.6 mL        Appearance: Normal	  HEENT:   Normal oral mucosa, PERRL, EOMI	  Lymphatic: No lymphadenopathy  Cardiovascular: Normal S1 S2, No JVD, No murmurs, No edema  Respiratory: Lungs clear to auscultation	  Psychiatry: A & O x 3, Mood & affect appropriate  Gastrointestinal:  Soft, Non-tender, + BS	  Skin: No rashes, No ecchymoses, No cyanosis	  Neurologic: Non-focal  Extremities: Normal range of motion, No clubbing, cyanosis or edema  Vascular: Peripheral pulses palpable 2+ bilaterally    LABS:	 	                        7.3    8.5   )-----------( 209      ( 20 Feb 2018 04:37 )             21.9     Auto Eosinophil # x     / Auto Eosinophil % x     / Auto Neutrophil # x     / Auto Neutrophil % x     / BANDS % x                            7.5    9.4   )-----------( 192      ( 19 Feb 2018 05:04 )             21.3     Auto Eosinophil # 0.1   / Auto Eosinophil % 1.4   / Auto Neutrophil # 7.8   / Auto Neutrophil % 83.3  / BANDS % x        INR: 1.76 ratio (02-20 @ 04:37)  INR: 1.90 ratio (02-19 @ 05:04)  INR: 1.87 ratio (02-18 @ 05:44)    02-20    135  |  94<L>  |  68<H>  ----------------------------<  95  4.4   |  22  |  7.03<H>  02-19    135  |  92<L>  |  60<H>  ----------------------------<  168<H>  3.8   |  22  |  5.75<H>  02-19    129<L>  |  89<L>  |  94<H>  ----------------------------<  107<H>  4.9   |  17<L>  |  8.28<H>    Ca    8.5      20 Feb 2018 04:37  Mg     2.6     02-20  Phos  5.6     02-20  TPro  6.8  /  Alb  2.8<L>  /  TBili  0.4  /  DBili  x   /  AST  37  /  ALT  23  /  AlkPhos  59  02-20  TPro  6.7  /  Alb  2.8<L>  /  TBili  0.3  /  DBili  x   /  AST  39  /  ALT  20  /  AlkPhos  62  02-19  TPro  6.5  /  Alb  2.5<L>  /  TBili  0.4  /  DBili  x   /  AST  41<H>  /  ALT  22  /  AlkPhos  58  02-19        proBNP:   Lipid Profile: 02-04 Chol 145 LDL 87 HDL 26<L> Trig 162<H>  HgA1c: 5.4 % (02-04 @ 21:37)    TSH:     CARDIAC MARKERS:   12 Feb 2018 02:21 Troponin 0.62 ng/mL / Creatine Kinase 61 U/L /  CKMB 2.7 ng/mL / CPK Mass Assay % x       05 Feb 2018 05:05 Troponin 0.73 ng/mL / Creatine Kinase 156 U/L /  CKMB 14.0 ng/mL / CPK Mass Assay % 9.0 %   05 Feb 2018 01:17 Troponin 0.80 ng/mL / Creatine Kinase 158 U/L /  CKMB 15.2 ng/mL / CPK Mass Assay % 9.6 %        TELEMETRY: 	    ECG:  	  RADIOLOGY:  OTHER: 	    PREVIOUS DIAGNOSTIC TESTING:    [ ] Echocardiogram:  [ ]  Catheterization:  [ ] Stress Test:  	  	  C[facility]  Contact # Patient is a 77y old  Male who presents with a chief complaint of Shortness of Breath (04 Feb 2018 17:51)      HPI: 77 year old man with past medical history of AAA 5.5cm s/p EVAAR repair with stents on 1/29 with incidental finding of Right Renal Neoplasm, previous history of Left Renal Neoplasm with Left Nephrectomy in 2008, Bladder Cancer, Known LBBB, HTN, HLD transferred from [facility] after initially presenting with SOB which began last night. Patient states that he was discharged from the hospital following AAA repair and recovering well.  After going to bed, he was awoken from his sleep complaining of SOB with no relief.  He went to [facility] wtih his SOB getting progressively worse with 1 episode of vomitus before going to hospital.  At [place], paient with elevate d-dimers and transferred for possible PE.  At [facility], patient with  RICKI in V2-V4 with elevated Trops, and ADHF requiring Bipap. (04 Feb 2018 17:51). Pt found to have renal artery stenosis secondary to partial occlusion of renal artery from recent EVAR. Pt also in DONNIE. During work-up a cardiac cath revealed triple vessel disease and pt needed to be started on HD for worsening renal failure. It was deemed to high risk for a renal artery bypass so an attempt was made to stent the renal artery, unsuccessful. 2/16 permacath RCW placed for HD. 2/18 pt underwent high risk PCI w/ RICHARDSON x2 to LAD, RICHARDSON x1 to OM1, and POBA to D1.   Overnight: uneventful. Dialysis shortened yesterday secondary to hypotension, 950ml removed.   ROS: "tired", weakness, hemoptysis.  Denies SOB, CP, N/V, Dizziness.    MEDICATIONS  (STANDING):  acetylcysteine 10% Inhalation 3 milliLiter(s) Inhalation every 6 hours  ALBUTerol/ipratropium for Nebulization 3 milliLiter(s) Nebulizer every 6 hours  argatroban Infusion 2 MICROgram(s)/kG/Min (8.16 mL/Hr) IV Continuous <Continuous>  aspirin enteric coated 81 milliGRAM(s) Oral daily  atorvastatin 80 milliGRAM(s) Oral at bedtime  clopidogrel Tablet 75 milliGRAM(s) Oral daily  docusate sodium 100 milliGRAM(s) Oral daily  hydrALAZINE 100 milliGRAM(s) Oral every 8 hours  isosorbide   dinitrate Tablet (ISORDIL) 20 milliGRAM(s) Oral three times a day  metoprolol succinate ER 75 milliGRAM(s) Oral daily  senna 1 Tablet(s) Oral at bedtime  sevelamer hydrochloride 1600 milliGRAM(s) Oral three times a day with meals    MEDICATIONS  (PRN):  diazepam    Tablet 5 milliGRAM(s) Oral two times a day PRN anxiety  polyethylene glycol 3350 17 Gram(s) Oral daily PRN Constipation    PHYSICAL EXAM:  Vital Signs Last 24 Hrs  T(C): 37 (20 Feb 2018 05:00), Max: 37 (20 Feb 2018 05:00)  T(F): 98.6 (20 Feb 2018 05:00), Max: 98.6 (20 Feb 2018 05:00)  HR: 96 (20 Feb 2018 07:00) (83 - 131)  BP: 84/60 (19 Feb 2018 13:00) (84/60 - 84/60)  BP(mean): 68 (19 Feb 2018 13:00) (68 - 68)  RR: 15 (20 Feb 2018 07:00) (10 - 27)  SpO2: 100% (20 Feb 2018 07:00) (91% - 100%)  I&O's Summary    19 Feb 2018 07:01  -  20 Feb 2018 07:00  --------------------------------------------------------  IN: 1178.6 mL / OUT: 950 mL / NET: 228.6 mL    PE:   Appearance: Normal	  HEENT:   Normal oral mucosa, PERRL, EOMI	  Lymphatic: No lymphadenopathy  Cardiovascular: Normal S1 S2, No JVD, No murmurs, No edema  Respiratory: coarse breath sounds through out  Psychiatry: A & O x 2 person and place, Mood & affect flat   Gastrointestinal:  Soft, Non-tender, + BS	  Skin: No rashes, No ecchymoses, No cyanosis.   Neurologic: Non-focal  Extremities: Normal range of motion, No clubbing, cyanosis or edema. B/L groin tender to touch, no hematoma, staples removed, slight errythema, site clean/dry, good approximation. 	  Vascular: Peripheral pulses palpable 2+ bilaterally RR hector D&I, L IJ cordis D&I no errrythemia R CW pascale D&I    Cath: 2/18/18 report not available  Echo: 2/18/18  Conclusions:  1. Aortic valve not well visualized; appears calcified.  Peak transaortic valve gradient equals 21 mm Hg, mean  transaortic valve gradient equals 13 mm Hg, estimated  aortic valve area equals 1.2 sqcm (by continuity equation),  aortic valve velocity time integral equals 43 cm,  consistent with moderate aortic stenosis. Mild aortic  regurgitation.  2. Normal left ventricular internal dimensions and wall  thickness.  3. Severe segmental left ventricular systolic dysfunction.  Endocardial visualization enhanced with intravenous  injection of echo contrast (Definity). The mid to distal  septum, anterior wall and apex are severely hypokinetic. No  LV thrombus. Septal motion consistent with cardiac surgery.  4. The right ventricle is not well visualized.  *** Compared with echocardiogram of 2/4/2018, no  significant changes noted.        LABS:	 	                        7.3    8.5   )-----------( 209      ( 20 Feb 2018 04:37 )             21.9     Auto Eosinophil # x     / Auto Eosinophil % x     / Auto Neutrophil # x     / Auto Neutrophil % x     / BANDS % x                            7.5    9.4   )-----------( 192      ( 19 Feb 2018 05:04 )             21.3     Auto Eosinophil # 0.1   / Auto Eosinophil % 1.4   / Auto Neutrophil # 7.8   / Auto Neutrophil % 83.3  / BANDS % x        INR: 1.76 ratio (02-20 @ 04:37)  INR: 1.90 ratio (02-19 @ 05:04)  INR: 1.87 ratio (02-18 @ 05:44)    02-20    135  |  94<L>  |  68<H>  ----------------------------<  95  4.4   |  22  |  7.03<H>  02-19    135  |  92<L>  |  60<H>  ----------------------------<  168<H>  3.8   |  22  |  5.75<H>  02-19    129<L>  |  89<L>  |  94<H>  ----------------------------<  107<H>  4.9   |  17<L>  |  8.28<H>    Ca    8.5      20 Feb 2018 04:37  Mg     2.6     02-20  Phos  5.6     02-20  TPro  6.8  /  Alb  2.8<L>  /  TBili  0.4  /  DBili  x   /  AST  37  /  ALT  23  /  AlkPhos  59  02-20  TPro  6.7  /  Alb  2.8<L>  /  TBili  0.3  /  DBili  x   /  AST  39  /  ALT  20  /  AlkPhos  62  02-19  TPro  6.5  /  Alb  2.5<L>  /  TBili  0.4  /  DBili  x   /  AST  41<H>  /  ALT  22  /  AlkPhos  58  02-19      TELEMETRY: 	no ectopy    ECG:  	  	  [facility]  Contact #[phone]

## 2018-02-20 NOTE — PROGRESS NOTE ADULT - PROBLEM SELECTOR PLAN 1
Patient with DONNIE on CKD in setting of significant renal artery occlusion: Baseline creatinine is between 1.2-1.3. Scr. on presentation was 6.48. Complements noted to be WNL.  Patient was initiated on CVVHDF on 2/5/18 and transitioned to IHD. Patient is currently dialysis dependent. s/p tunneled HD catheter placement. Last HD was done yesterday but was not completed due to A.fib. Patient appears fluid overloaded on exam today. Will arrange for HD today with UF. Monitor BMP daily.

## 2018-02-20 NOTE — BEHAVIORAL HEALTH ASSESSMENT NOTE - SUMMARY
The patient is a 77 year old  male who works as a ,  with 3 adult children, domiciled with his girlfriend, with no previous psychiatric history who is currently admitted due to NSTEMI, acute decompensated heart failure, and newly placed on dialysis following AAA repair in late January. Psychiatry was consulted due to confusion and agitation.  On interview, pt is AOx1, inattentive, waxing and waning, hypoactive although RN reports periods of agitation.  Pt lives independently at baseline, still works and drives, manages own finances.  Sx c/w delirium 2/2 [facility].

## 2018-02-20 NOTE — CHART NOTE - NSCHARTNOTEFT_GEN_A_CORE
Source: Patient [ ]    Family [X ]     other [X ] RN, Medical record, Pt's sister at bedside    Pt seen for malnutrition follow up. Pt seen sleeping in bed, woke up long enough to say hello, then falls back to sleep. Per Sister at bedside, Pt remains with limited PO intake, but does accept both Nepro daily and no carb pro source. Sister states she puts the pro source in apple causes and feeds it to the Pt daily. Rd witnessed breakfast tray with only 1 spoonful of egg consumed thus far. per sister, pt has not requested any food preferences and is rejecting more made foods from home. RD encouraged small frequent meals/snacks, as well as intake of Nepro/ pro source daily. Noted Pt new to coumadin, RD provided coumadin/vitamin  drug interaction education to sister and provided written education materials.     Per chart, Pt admitted for hypoxic respiratory failure due to pulmonary edema in the setting of ADHF and PNA. Pt with history of nephrectomy, now with DONNIE on CKD previously on CVVHD, now started on intermittent  HD via PermCath. Pt now s/o high risk PCI with RICHARDSON x3    Diet : Mechanical soft, Renal, 1.2L fluid restriction, Nepro x2 and no carb prosource x1      Patient reports no GI distress       PO intake:  < 50% [X ]      Source for PO intake [ ] Patient [ X] family [ X] chart [ ] staff [ ] other    Current Weight: 156lbs, wt fluctuations noted. Likely related to fluid shifts on HD   % Weight Change    Pertinent Medications: MEDICATIONS  (STANDING):  acetylcysteine 10% Inhalation 3 milliLiter(s) Inhalation every 6 hours  ALBUTerol/ipratropium for Nebulization 3 milliLiter(s) Nebulizer every 6 hours  argatroban Infusion 2 MICROgram(s)/kG/Min (8.16 mL/Hr) IV Continuous <Continuous>  aspirin enteric coated 81 milliGRAM(s) Oral daily  atorvastatin 80 milliGRAM(s) Oral at bedtime  clopidogrel Tablet 75 milliGRAM(s) Oral daily  docusate sodium 100 milliGRAM(s) Oral daily  hydrALAZINE 100 milliGRAM(s) Oral every 8 hours  isosorbide   dinitrate Tablet (ISORDIL) 20 milliGRAM(s) Oral three times a day  metoprolol succinate ER 75 milliGRAM(s) Oral daily  senna 1 Tablet(s) Oral at bedtime  sevelamer hydrochloride 1600 milliGRAM(s) Oral three times a day with meals    MEDICATIONS  (PRN):  diazepam    Tablet 5 milliGRAM(s) Oral two times a day PRN anxiety  polyethylene glycol 3350 17 Gram(s) Oral daily PRN Constipation    Pertinent Labs:  02-20 Na135 mmol/L Glu 95 mg/dL K+ 4.4 mmol/L Cr  7.03 mg/dL<H> BUN 68 mg/dL<H> Phos 5.6 mg/dL<H> Alb 2.8 g/dL<L>     Skin: intact, +1 generalized edema     Estimated Needs:   [ X] no change since previous assessment  [ ] recalculated:       Previous Nutrition Diagnosis:    [ X] Malnutrition          Nutrition Diagnosis is [X] ongoing    New Nutrition Diagnosis:     [X ] Food & Nutrition Related Knowledge Deficit related to limited exposure to nutrition related information as evidenced by Pt new to Coumadin and new to renal diet      Interventions:     Recommend    1. Provide food preferences as requested by Pt/family within diet restrictions    2. Encourage PO intake during meal times   3. Reviewed menu ordering procedures   4. Coumadin education and renal diet education reviewed with Pt's sister at bedside, Written materials provided   5. Continue current Diet and supplementation; States requests nepro to be wildberry x2       Monitoring and Evaluation:     [ X] PO intake [X ] Tolerance to diet prescription [ X] weights [ X] follow up per protocol    [x ] other: RD remains available Sarah Siegler RD. Pager #[phone]

## 2018-02-21 DIAGNOSIS — D64.9 ANEMIA, UNSPECIFIED: ICD-10-CM

## 2018-02-21 LAB
ALBUMIN SERPL ELPH-MCNC: 2.9 G/DL — LOW (ref 3.3–5)
ALP SERPL-CCNC: 60 U/L — SIGNIFICANT CHANGE UP (ref 40–120)
ALT FLD-CCNC: 20 U/L RC — SIGNIFICANT CHANGE UP (ref 10–45)
ANION GAP SERPL CALC-SCNC: 14 MMOL/L — SIGNIFICANT CHANGE UP (ref 5–17)
APTT BLD: 57.8 SEC — HIGH (ref 27.5–37.4)
APTT BLD: 69.5 SEC — HIGH (ref 27.5–37.4)
AST SERPL-CCNC: 35 U/L — SIGNIFICANT CHANGE UP (ref 10–40)
BASOPHILS # BLD AUTO: 0.1 K/UL — SIGNIFICANT CHANGE UP (ref 0–0.2)
BASOPHILS NFR BLD AUTO: 0.8 % — SIGNIFICANT CHANGE UP (ref 0–2)
BILIRUB SERPL-MCNC: 0.4 MG/DL — SIGNIFICANT CHANGE UP (ref 0.2–1.2)
BUN SERPL-MCNC: 34 MG/DL — HIGH (ref 7–23)
CALCIUM SERPL-MCNC: 8.6 MG/DL — SIGNIFICANT CHANGE UP (ref 8.4–10.5)
CHLORIDE SERPL-SCNC: 97 MMOL/L — SIGNIFICANT CHANGE UP (ref 96–108)
CO2 SERPL-SCNC: 28 MMOL/L — SIGNIFICANT CHANGE UP (ref 22–31)
CREAT SERPL-MCNC: 4.43 MG/DL — HIGH (ref 0.5–1.3)
EOSINOPHIL # BLD AUTO: 0.1 K/UL — SIGNIFICANT CHANGE UP (ref 0–0.5)
EOSINOPHIL NFR BLD AUTO: 1.2 % — SIGNIFICANT CHANGE UP (ref 0–6)
GLUCOSE SERPL-MCNC: 107 MG/DL — HIGH (ref 70–99)
HCT VFR BLD CALC: 21.5 % — LOW (ref 39–50)
HGB BLD-MCNC: 7.2 G/DL — LOW (ref 13–17)
INR BLD: 2.03 RATIO — HIGH (ref 0.88–1.16)
LYMPHOCYTES # BLD AUTO: 0.6 K/UL — LOW (ref 1–3.3)
LYMPHOCYTES # BLD AUTO: 8 % — LOW (ref 13–44)
MAGNESIUM SERPL-MCNC: 2.4 MG/DL — SIGNIFICANT CHANGE UP (ref 1.6–2.6)
MCHC RBC-ENTMCNC: 31.1 PG — SIGNIFICANT CHANGE UP (ref 27–34)
MCHC RBC-ENTMCNC: 33.4 GM/DL — SIGNIFICANT CHANGE UP (ref 32–36)
MCV RBC AUTO: 93.2 FL — SIGNIFICANT CHANGE UP (ref 80–100)
MONOCYTES # BLD AUTO: 0.6 K/UL — SIGNIFICANT CHANGE UP (ref 0–0.9)
MONOCYTES NFR BLD AUTO: 7.2 % — SIGNIFICANT CHANGE UP (ref 2–14)
NEUTROPHILS # BLD AUTO: 6.5 K/UL — SIGNIFICANT CHANGE UP (ref 1.8–7.4)
NEUTROPHILS NFR BLD AUTO: 82.8 % — HIGH (ref 43–77)
PHOSPHATE SERPL-MCNC: 3.3 MG/DL — SIGNIFICANT CHANGE UP (ref 2.5–4.5)
PLATELET # BLD AUTO: 230 K/UL — SIGNIFICANT CHANGE UP (ref 150–400)
POTASSIUM SERPL-MCNC: 3.8 MMOL/L — SIGNIFICANT CHANGE UP (ref 3.5–5.3)
POTASSIUM SERPL-SCNC: 3.8 MMOL/L — SIGNIFICANT CHANGE UP (ref 3.5–5.3)
PROT SERPL-MCNC: 6.8 G/DL — SIGNIFICANT CHANGE UP (ref 6–8.3)
PROTHROM AB SERPL-ACNC: 22.4 SEC — HIGH (ref 9.8–12.7)
RBC # BLD: 2.3 M/UL — LOW (ref 4.2–5.8)
RBC # FLD: 12.5 % — SIGNIFICANT CHANGE UP (ref 10.3–14.5)
SODIUM SERPL-SCNC: 139 MMOL/L — SIGNIFICANT CHANGE UP (ref 135–145)
WBC # BLD: 7.8 K/UL — SIGNIFICANT CHANGE UP (ref 3.8–10.5)
WBC # FLD AUTO: 7.8 K/UL — SIGNIFICANT CHANGE UP (ref 3.8–10.5)

## 2018-02-21 PROCEDURE — 93010 ELECTROCARDIOGRAM REPORT: CPT

## 2018-02-21 PROCEDURE — 99233 SBSQ HOSP IP/OBS HIGH 50: CPT

## 2018-02-21 PROCEDURE — 99233 SBSQ HOSP IP/OBS HIGH 50: CPT | Mod: GC

## 2018-02-21 PROCEDURE — 93931 UPPER EXTREMITY STUDY: CPT | Mod: 26

## 2018-02-21 PROCEDURE — 99232 SBSQ HOSP IP/OBS MODERATE 35: CPT

## 2018-02-21 RX ORDER — ARGATROBAN 50 MG/50ML
2 INJECTION, SOLUTION INTRAVENOUS
Qty: 250 | Refills: 0 | Status: DISCONTINUED | OUTPATIENT
Start: 2018-02-21 | End: 2018-02-22

## 2018-02-21 RX ORDER — WARFARIN SODIUM 2.5 MG/1
2.5 TABLET ORAL ONCE
Qty: 0 | Refills: 0 | Status: COMPLETED | OUTPATIENT
Start: 2018-02-21 | End: 2018-02-21

## 2018-02-21 RX ORDER — METOPROLOL TARTRATE 50 MG
100 TABLET ORAL DAILY
Qty: 0 | Refills: 0 | Status: DISCONTINUED | OUTPATIENT
Start: 2018-02-22 | End: 2018-02-22

## 2018-02-21 RX ORDER — ISOSORBIDE DINITRATE 5 MG/1
20 TABLET ORAL THREE TIMES A DAY
Qty: 0 | Refills: 0 | Status: COMPLETED | OUTPATIENT
Start: 2018-02-21 | End: 2018-02-21

## 2018-02-21 RX ORDER — LISINOPRIL 2.5 MG/1
5 TABLET ORAL ONCE
Qty: 0 | Refills: 0 | Status: COMPLETED | OUTPATIENT
Start: 2018-02-21 | End: 2018-02-21

## 2018-02-21 RX ORDER — SIMETHICONE 80 MG/1
80 TABLET, CHEWABLE ORAL ONCE
Qty: 0 | Refills: 0 | Status: COMPLETED | OUTPATIENT
Start: 2018-02-21 | End: 2018-02-21

## 2018-02-21 RX ORDER — ERYTHROPOIETIN 10000 [IU]/ML
4000 INJECTION, SOLUTION INTRAVENOUS; SUBCUTANEOUS
Qty: 0 | Refills: 0 | Status: DISCONTINUED | OUTPATIENT
Start: 2018-02-21 | End: 2018-02-24

## 2018-02-21 RX ORDER — LANOLIN ALCOHOL/MO/W.PET/CERES
3 CREAM (GRAM) TOPICAL AT BEDTIME
Qty: 0 | Refills: 0 | Status: DISCONTINUED | OUTPATIENT
Start: 2018-02-21 | End: 2018-02-24

## 2018-02-21 RX ORDER — LISINOPRIL 2.5 MG/1
10 TABLET ORAL DAILY
Qty: 0 | Refills: 0 | Status: DISCONTINUED | OUTPATIENT
Start: 2018-02-22 | End: 2018-02-23

## 2018-02-21 RX ORDER — ARGATROBAN 50 MG/50ML
2 INJECTION, SOLUTION INTRAVENOUS
Qty: 250 | Refills: 0 | Status: DISCONTINUED | OUTPATIENT
Start: 2018-02-21 | End: 2018-02-21

## 2018-02-21 RX ORDER — HYDRALAZINE HCL 50 MG
75 TABLET ORAL EVERY 8 HOURS
Qty: 0 | Refills: 0 | Status: DISCONTINUED | OUTPATIENT
Start: 2018-02-21 | End: 2018-02-21

## 2018-02-21 RX ORDER — WARFARIN SODIUM 2.5 MG/1
2 TABLET ORAL ONCE
Qty: 0 | Refills: 0 | Status: DISCONTINUED | OUTPATIENT
Start: 2018-02-21 | End: 2018-02-21

## 2018-02-21 RX ORDER — SODIUM CHLORIDE 9 MG/ML
10 INJECTION INTRAMUSCULAR; INTRAVENOUS; SUBCUTANEOUS EVERY 8 HOURS
Qty: 0 | Refills: 0 | Status: DISCONTINUED | OUTPATIENT
Start: 2018-02-21 | End: 2018-02-21

## 2018-02-21 RX ADMIN — Medication 3 MILLILITER(S): at 11:55

## 2018-02-21 RX ADMIN — Medication 75 MILLIGRAM(S): at 14:53

## 2018-02-21 RX ADMIN — WARFARIN SODIUM 2.5 MILLIGRAM(S): 2.5 TABLET ORAL at 22:06

## 2018-02-21 RX ADMIN — Medication 100 MILLIGRAM(S): at 11:05

## 2018-02-21 RX ADMIN — ISOSORBIDE DINITRATE 20 MILLIGRAM(S): 5 TABLET ORAL at 14:53

## 2018-02-21 RX ADMIN — Medication 3 MILLILITER(S): at 11:54

## 2018-02-21 RX ADMIN — SENNA PLUS 1 TABLET(S): 8.6 TABLET ORAL at 22:06

## 2018-02-21 RX ADMIN — Medication 3 MILLILITER(S): at 06:56

## 2018-02-21 RX ADMIN — SODIUM CHLORIDE 10 MILLILITER(S): 9 INJECTION INTRAMUSCULAR; INTRAVENOUS; SUBCUTANEOUS at 05:15

## 2018-02-21 RX ADMIN — SIMETHICONE 80 MILLIGRAM(S): 80 TABLET, CHEWABLE ORAL at 21:19

## 2018-02-21 RX ADMIN — Medication 3 MILLILITER(S): at 06:55

## 2018-02-21 RX ADMIN — ISOSORBIDE DINITRATE 20 MILLIGRAM(S): 5 TABLET ORAL at 22:06

## 2018-02-21 RX ADMIN — ARGATROBAN 8.16 MICROGRAM(S)/KG/MIN: 50 INJECTION, SOLUTION INTRAVENOUS at 14:53

## 2018-02-21 RX ADMIN — SODIUM CHLORIDE 10 MILLILITER(S): 9 INJECTION INTRAMUSCULAR; INTRAVENOUS; SUBCUTANEOUS at 14:54

## 2018-02-21 RX ADMIN — ARGATROBAN 8.16 MICROGRAM(S)/KG/MIN: 50 INJECTION, SOLUTION INTRAVENOUS at 02:00

## 2018-02-21 RX ADMIN — ISOSORBIDE DINITRATE 20 MILLIGRAM(S): 5 TABLET ORAL at 05:11

## 2018-02-21 RX ADMIN — Medication 3 MILLIGRAM(S): at 22:06

## 2018-02-21 RX ADMIN — CLOPIDOGREL BISULFATE 75 MILLIGRAM(S): 75 TABLET, FILM COATED ORAL at 11:05

## 2018-02-21 RX ADMIN — Medication 75 MILLIGRAM(S): at 05:11

## 2018-02-21 RX ADMIN — Medication 100 MILLIGRAM(S): at 05:11

## 2018-02-21 RX ADMIN — Medication 81 MILLIGRAM(S): at 11:05

## 2018-02-21 RX ADMIN — SEVELAMER CARBONATE 1600 MILLIGRAM(S): 2400 POWDER, FOR SUSPENSION ORAL at 17:51

## 2018-02-21 RX ADMIN — SEVELAMER CARBONATE 1600 MILLIGRAM(S): 2400 POWDER, FOR SUSPENSION ORAL at 11:04

## 2018-02-21 RX ADMIN — ATORVASTATIN CALCIUM 80 MILLIGRAM(S): 80 TABLET, FILM COATED ORAL at 22:06

## 2018-02-21 RX ADMIN — SEVELAMER CARBONATE 1600 MILLIGRAM(S): 2400 POWDER, FOR SUSPENSION ORAL at 09:50

## 2018-02-21 RX ADMIN — LISINOPRIL 5 MILLIGRAM(S): 2.5 TABLET ORAL at 17:51

## 2018-02-21 RX ADMIN — Medication 2.5 MILLIGRAM(S): at 11:11

## 2018-02-21 NOTE — PROGRESS NOTE ADULT - PROBLEM SELECTOR PLAN 3
In setting of renal failure: Serum phosphorus is 3.3 today. C/w renagel 1600 mg TID with meals. Monitor serum phosphorus level. In setting of renal failure: Serum phosphorus is in acceptable range (3.3 today). C/w renagel 1600 mg TID with meals. Monitor serum phosphorus level.  Check iPTH.

## 2018-02-21 NOTE — PROGRESS NOTE ADULT - SUBJECTIVE AND OBJECTIVE BOX
[facility] Division of Kidney Diseases & Hypertension  FOLLOW UP NOTE  [phone]--------------------------------------------------------------------------------    Last HD was done yesterday with removal of 2 L UF  Denies complains of SOB, CP, abdominal pain, nausea    PAST HISTORY  --------------------------------------------------------------------------------  No significant changes to PMH, PSH, FHx, SHx, unless otherwise noted    ALLERGIES & MEDICATIONS  --------------------------------------------------------------------------------  Allergies    Cipro (Other)  heparin (Other (Severe))  Levaquin (Other)  penicillin (Hives)    Intolerances      Standing Inpatient Medications  acetylcysteine 10% Inhalation 3 milliLiter(s) Inhalation every 6 hours  ALBUTerol/ipratropium for Nebulization 3 milliLiter(s) Nebulizer every 6 hours  aspirin enteric coated 81 milliGRAM(s) Oral daily  atorvastatin 80 milliGRAM(s) Oral at bedtime  clopidogrel Tablet 75 milliGRAM(s) Oral daily  docusate sodium 100 milliGRAM(s) Oral daily  hydrALAZINE 75 milliGRAM(s) Oral every 8 hours  isosorbide   dinitrate Tablet (ISORDIL) 20 milliGRAM(s) Oral three times a day  senna 1 Tablet(s) Oral at bedtime  sevelamer hydrochloride 1600 milliGRAM(s) Oral three times a day with meals  sodium chloride 0.9% lock flush 10 milliLiter(s) IV Push every 8 hours  warfarin 2.5 milliGRAM(s) Oral once    PRN Inpatient Medications  diazepam    Tablet 2.5 milliGRAM(s) Oral daily PRN  polyethylene glycol 3350 17 Gram(s) Oral daily PRN      REVIEW OF SYSTEMS  --------------------------------------------------------------------------------  Resp: no SOB  CV: no CP  GI: no abdominal pain  MSK: no edema     VITALS/PHYSICAL EXAM  --------------------------------------------------------------------------------  T(C): 37 (02-21-18 @ 07:00), Max: 37 (02-20-18 @ 18:00)  HR: 94 (02-21-18 @ 09:00) (84 - 101)  BP: 108/62 (02-21-18 @ 09:00) (108/62 - 134/71)  RR: 25 (02-21-18 @ 09:00) (12 - 25)  SpO2: 97% (02-21-18 @ 09:00) (96% - 100%)  Wt(kg): --        02-20-18 @ 07:01  -  02-21-18 @ 07:00  --------------------------------------------------------  IN: 2152.2 mL / OUT: 2900 mL / NET: -747.8 mL    02-21-18 @ 07:01  -  02-21-18 @ 11:00  --------------------------------------------------------  IN: 60 mL / OUT: 0 mL / NET: 60 mL      Physical Exam:  	             Gen: NAD  	Pulm: lungs clear on auscultation.   	CV: RRR, S1S2; no rub  	Abd: +BS, soft, nontender/nondistended  	: No suprapubic tenderness  	UE: Warm,  no edema; no asterixis  	LE: Warm,  no edema  	Skin: Warm,  	Vascular access: RIJ tunneled cath    LABS/STUDIES  --------------------------------------------------------------------------------              7.2    7.8   >-----------<  230      [02-21-18 @ 04:19]              21.5     139  |  97  |  34  ----------------------------<  107      [02-21-18 @ 04:19]  3.8   |  28  |  4.43        Ca     8.6     [02-21-18 @ 04:19]      Mg     2.4     [02-21-18 @ 04:19]      Phos  3.3     [02-21-18 @ 04:19]    TPro  6.8  /  Alb  2.9  /  TBili  0.4  /  DBili  x   /  AST  35  /  ALT  20  /  AlkPhos  60  [02-21-18 @ 04:19]    PT/INR: PT 22.4 , INR 2.03       [02-21-18 @ 04:19]  PTT: 57.8       [02-21-18 @ 04:19]      Creatinine Trend:  SCr 4.43 [02-21 @ 04:19]  SCr 7.03 [02-20 @ 04:37]  SCr 5.75 [02-19 @ 15:22]  SCr 8.28 [02-19 @ 05:04]  SCr 6.83 [02-18 @ 05:43] [facility] Division of Kidney Diseases & Hypertension  FOLLOW UP NOTE  [phone]--------------------------------------------------------------------------------    Last HD was done yesterday with removal of 2 L UF  Denies complains of SOB, CP, abdominal pain, nausea    PAST HISTORY  --------------------------------------------------------------------------------  No significant changes to PMH, PSH, FHx, SHx, unless otherwise noted    ALLERGIES & MEDICATIONS  --------------------------------------------------------------------------------  Allergies    Cipro (Other)  heparin (Other (Severe))  Levaquin (Other)  penicillin (Hives)    Intolerances      Standing Inpatient Medications  acetylcysteine 10% Inhalation 3 milliLiter(s) Inhalation every 6 hours  ALBUTerol/ipratropium for Nebulization 3 milliLiter(s) Nebulizer every 6 hours  aspirin enteric coated 81 milliGRAM(s) Oral daily  atorvastatin 80 milliGRAM(s) Oral at bedtime  clopidogrel Tablet 75 milliGRAM(s) Oral daily  docusate sodium 100 milliGRAM(s) Oral daily  hydrALAZINE 75 milliGRAM(s) Oral every 8 hours  isosorbide   dinitrate Tablet (ISORDIL) 20 milliGRAM(s) Oral three times a day  senna 1 Tablet(s) Oral at bedtime  sevelamer hydrochloride 1600 milliGRAM(s) Oral three times a day with meals  sodium chloride 0.9% lock flush 10 milliLiter(s) IV Push every 8 hours  warfarin 2.5 milliGRAM(s) Oral once    PRN Inpatient Medications  diazepam    Tablet 2.5 milliGRAM(s) Oral daily PRN  polyethylene glycol 3350 17 Gram(s) Oral daily PRN      REVIEW OF SYSTEMS  --------------------------------------------------------------------------------  Resp: no SOB  CV: no CP  GI: no abdominal pain  MSK: no edema     VITALS/PHYSICAL EXAM  --------------------------------------------------------------------------------  T(C): 37 (02-21-18 @ 07:00), Max: 37 (02-20-18 @ 18:00)  HR: 94 (02-21-18 @ 09:00) (84 - 101)  BP: 108/62 (02-21-18 @ 09:00) (108/62 - 134/71)  RR: 25 (02-21-18 @ 09:00) (12 - 25)  SpO2: 97% (02-21-18 @ 09:00) (96% - 100%)  Wt(kg): --        02-20-18 @ 07:01  -  02-21-18 @ 07:00  --------------------------------------------------------  IN: 2152.2 mL / OUT: 2900 mL / NET: -747.8 mL    02-21-18 @ 07:01  -  02-21-18 @ 11:00  --------------------------------------------------------  IN: 60 mL / OUT: 0 mL / NET: 60 mL      Physical Exam:  	             Gen: NAD  	Pulm: lungs clear on auscultation.   	CV: RRR, S1S2; no rub  	Abd: +BS, soft, nontender/nondistended  	: No suprapubic tenderness  	UE: Warm,  no edema; no asterixis  	LE: Warm,  no edema  	Skin: Warm,  	Vascular access: RIJ tunneled dialysis catheter    LABS/STUDIES  --------------------------------------------------------------------------------              7.2    7.8   >-----------<  230      [02-21-18 @ 04:19]              21.5     139  |  97  |  34  ----------------------------<  107      [02-21-18 @ 04:19]  3.8   |  28  |  4.43        Ca     8.6     [02-21-18 @ 04:19]      Mg     2.4     [02-21-18 @ 04:19]      Phos  3.3     [02-21-18 @ 04:19]    TPro  6.8  /  Alb  2.9  /  TBili  0.4  /  DBili  x   /  AST  35  /  ALT  20  /  AlkPhos  60  [02-21-18 @ 04:19]    PT/INR: PT 22.4 , INR 2.03       [02-21-18 @ 04:19]  PTT: 57.8       [02-21-18 @ 04:19]      Creatinine Trend:  SCr 4.43 [02-21 @ 04:19]  SCr 7.03 [02-20 @ 04:37]  SCr 5.75 [02-19 @ 15:22]  SCr 8.28 [02-19 @ 05:04]  SCr 6.83 [02-18 @ 05:43]

## 2018-02-21 NOTE — CHART NOTE - NSCHARTNOTEFT_GEN_A_CORE
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 s/t bleeding from [facility] site s/p 1 un pRBC 2/12. Renal artery unable to open. Permacath placed by IR on 2/16    ====================  NEW EVENTS:  ====================    MEDICATIONS  (STANDING):  acetylcysteine 10% Inhalation 3 milliLiter(s) Inhalation every 6 hours  ALBUTerol/ipratropium for Nebulization 3 milliLiter(s) Nebulizer every 6 hours  argatroban Infusion 2 MICROgram(s)/kG/Min (8.16 mL/Hr) IV Continuous <Continuous>  aspirin enteric coated 81 milliGRAM(s) Oral daily  atorvastatin 80 milliGRAM(s) Oral at bedtime  clopidogrel Tablet 75 milliGRAM(s) Oral daily  docusate sodium 100 milliGRAM(s) Oral daily  epoetin gregoria Injectable 4000 Unit(s) IV Push <User Schedule>  isosorbide   dinitrate Tablet (ISORDIL) 20 milliGRAM(s) Oral three times a day  melatonin 3 milliGRAM(s) Oral at bedtime  senna 1 Tablet(s) Oral at bedtime  sevelamer hydrochloride 1600 milliGRAM(s) Oral three times a day with meals  warfarin 2.5 milliGRAM(s) Oral once    MEDICATIONS  (PRN):  diazepam    Tablet 2.5 milliGRAM(s) Oral daily PRN anxiety  polyethylene glycol 3350 17 Gram(s) Oral daily PRN Constipation    ====================  VITALS (Last 12 hrs):  ====================    T(C): 37 (02-21-18 @ 19:00), Max: 37 (02-21-18 @ 19:00)  T(F): 98.6 (02-21-18 @ 19:00), Max: 98.6 (02-21-18 @ 19:00)  HR: 86 (02-21-18 @ 21:00) (86 - 97)  BP: 109/68 (02-21-18 @ 21:00) (103/59 - 128/79)  BP(mean): 81 (02-21-18 @ 21:00) (72 - 94)  ABP: --  ABP(mean): --  RR: 18 (02-21-18 @ 21:00) (14 - 21)  SpO2: 99% (02-21-18 @ 21:00) (92% - 100%)  Wt(kg): --  CVP(mm Hg): --  CVP(cm H2O): --  CO: --  CI: --  PA: --  PA(mean): --  PCWP: --  SVR: --  PVR: --    I&O's Summary    20 Feb 2018 07:01  -  21 Feb 2018 07:00  --------------------------------------------------------  IN: 2152.2 mL / OUT: 2900 mL / NET: -747.8 mL    21 Feb 2018 07:01  -  21 Feb 2018 21:24  --------------------------------------------------------  IN: 233.7 mL / OUT: 0 mL / NET: 233.7 mL    ====================  NEW LABS:  ====================    02-21    139  |  97  |  34<H>  ----------------------------<  107<H>  3.8   |  28  |  4.43<H>    Ca    8.6      21 Feb 2018 04:19  Phos  3.3     02-21  Mg     2.4     02-21    TPro  6.8  /  Alb  2.9<L>  /  TBili  0.4  /  DBili  x   /  AST  35  /  ALT  20  /  AlkPhos  60  02-21    PT/INR - ( 21 Feb 2018 04:19 )   PT: 22.4 sec;   INR: 2.03 ratio      PTT - ( 21 Feb 2018 17:07 )  PTT:69.5 sec    ====================  PLAN:  ====================  -       Letybeltran STERLING NP  Beeper #[id number]  Spectra # [phone] ====================  CCU MIDNIGHT ROUNDS  ====================    DEUCE BALL  [id number]  Patient is a 77y old  Male who presents with a chief complaint of Shortness of Breath (04 Feb 2018 17:51)    ====================  SUMMARY:  ====================  77M PMH AAA 5.5cm s/p EVAAR repair on 1/29, R kidney Neoplasm, previous history of L kidney neoplasm with Lnephrectomy 08, Bladder Ca, Known LBBB, HTN, HLD transferred from OSH w/ ADHF, DONNIE on CKD s/p CVV, now on HD, multifocal PNA, NSTEMI, c/b rapid a fib ([id number] 5) on heparin gtt, 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 s/t bleeding from [facility] site s/p 1 unit pRBC 2/12. Renal artery unable to open. Permacath placed by IR on 2/16    ====================  NEW EVENTS:  ====================  Comfortable on RA.  c/o B/L groin tenderness on palpation.  Site with no drainage, no redness.    MEDICATIONS  (STANDING):  acetylcysteine 10% Inhalation 3 milliLiter(s) Inhalation every 6 hours  ALBUTerol/ipratropium for Nebulization 3 milliLiter(s) Nebulizer every 6 hours  argatroban Infusion 2 MICROgram(s)/kG/Min (8.16 mL/Hr) IV Continuous <Continuous>  aspirin enteric coated 81 milliGRAM(s) Oral daily  atorvastatin 80 milliGRAM(s) Oral at bedtime  clopidogrel Tablet 75 milliGRAM(s) Oral daily  docusate sodium 100 milliGRAM(s) Oral daily  epoetin gregoria Injectable 4000 Unit(s) IV Push <User Schedule>  isosorbide   dinitrate Tablet (ISORDIL) 20 milliGRAM(s) Oral three times a day  melatonin 3 milliGRAM(s) Oral at bedtime  senna 1 Tablet(s) Oral at bedtime  sevelamer hydrochloride 1600 milliGRAM(s) Oral three times a day with meals  warfarin 2.5 milliGRAM(s) Oral once    MEDICATIONS  (PRN):  diazepam    Tablet 2.5 milliGRAM(s) Oral daily PRN anxiety  polyethylene glycol 3350 17 Gram(s) Oral daily PRN Constipation    ====================  VITALS (Last 12 hrs):  ====================    T(C): 37 (02-21-18 @ 19:00), Max: 37 (02-21-18 @ 19:00)  T(F): 98.6 (02-21-18 @ 19:00), Max: 98.6 (02-21-18 @ 19:00)  HR: 86 (02-21-18 @ 21:00) (86 - 97)  BP: 109/68 (02-21-18 @ 21:00) (103/59 - 128/79)  BP(mean): 81 (02-21-18 @ 21:00) (72 - 94)  ABP: --  ABP(mean): --  RR: 18 (02-21-18 @ 21:00) (14 - 21)  SpO2: 99% (02-21-18 @ 21:00) (92% - 100%)  Wt(kg): --  CVP(mm Hg): --  CVP(cm H2O): --  CO: --  CI: --  PA: --  PA(mean): --  PCWP: --  SVR: --  PVR: --    I&O's Summary    20 Feb 2018 07:01  -  21 Feb 2018 07:00  --------------------------------------------------------  IN: 2152.2 mL / OUT: 2900 mL / NET: -747.8 mL    21 Feb 2018 07:01  -  21 Feb 2018 21:24  --------------------------------------------------------  IN: 233.7 mL / OUT: 0 mL / NET: 233.7 mL    ====================  NEW LABS:  ====================    02-21    139  |  97  |  34<H>  ----------------------------<  107<H>  3.8   |  28  |  4.43<H>    Ca    8.6      21 Feb 2018 04:19  Phos  3.3     02-21  Mg     2.4     02-21    TPro  6.8  /  Alb  2.9<L>  /  TBili  0.4  /  DBili  x   /  AST  35  /  ALT  20  /  AlkPhos  60  02-21    PT/INR - ( 21 Feb 2018 04:19 )   PT: 22.4 sec;   INR: 2.03 ratio      PTT - ( 21 Feb 2018 17:07 )  PTT:69.5 sec    ====================  PLAN:  ====================  - s/p removal of B/L groin sutures.  Monitor for signs of infection  - Continue Argatroban and Coumadin daily for at least 5 days.  Monitor INR daily to keep 2-3  - Attempt to increase Lisinopril to 10 mg in am and discontinue Isordil if tolerated  - Continue Toprol  mg  - Monitor BP closely  - HD per renal in am  - Discontinue left IJ cordis in am or prior to discharge  - Valium prn for anxiety and Ativan prn for severe agitation per Psyche  - OOB to chair with PT and increase activity as tolerated    Lety Dyer CCU NP  Beeper #[id number]  Spectra # [phone]

## 2018-02-21 NOTE — PROGRESS NOTE ADULT - PROBLEM SELECTOR PLAN 2
Patient's volume status seems to be improved. No plan for HD today. Will arrange for maintenance HD tomorrow. Patient's volume status seems to be improved post yesterday's HD. No plan for HD today. Will arrange for maintenance HD tomorrow. MOnitor weight daily.

## 2018-02-21 NOTE — CHART NOTE - NSCHARTNOTEFT_GEN_A_CORE
Asked to comment on finding of AV fistula on the left arm.  Test was performed due to palpation of thrill on exam of left arm.  The patient has no symptoms or swelling of the left arm whatsoever.  The fistula is small and should be managed conservatively as he is symptomatic.  Further manangement of creation of AV fistula to be determined on clinical course and vasc surgery recs.        Discussed with family, floor staff of CCU and Dr. Garg. Asked to comment on finding of AV fistula on the left arm.  Test was performed due to palpation of thrill on exam of left arm.  The patient has no symptoms or swelling of the left arm whatsoever.  The fistula is small and should be managed conservatively as he is asymptomatic.  Further management of creation of AV fistula to be determined on clinical course and vasc surgery recs.        Discussed with family, floor staff of CCU and Dr. Garg.

## 2018-02-21 NOTE — PROGRESS NOTE ADULT - PROBLEM SELECTOR PLAN 1
Patient with DONNIE on CKD in setting of significant renal artery occlusion: Baseline creatinine is between 1.2-1.3. Scr. on presentation was 6.48. Complements noted to be WNL.  Patient was initiated on CVVHDF on 2/5/18 and transitioned to IHD. Patient is currently dialysis dependent. s/p tunneled HD catheter placement. Last HD was done yesterday with 2 L fluid removal. No plan for HD today. Will arrange for maintenance HD tomorrow.  Monitor BMP daily. Patient with DONNIE on CKD in setting of significant renal artery occlusion, now ESRD: Baseline creatinine is between 1.2-1.3. Scr. on presentation was 6.48. Complements noted to be WNL.  Patient was initiated on CVVHDF on 2/5/18 and transitioned to IHD. Patient is currently dialysis dependent. s/p tunneled HD catheter placement. Last HD was done yesterday with 2 L fluid removal. Clinically stable. No plan for HD today. Will arrange for maintenance HD tomorrow.  Monitor BMP daily.  Recommend vascular surgery evaluation for AVF creation.

## 2018-02-21 NOTE — PROGRESS NOTE BEHAVIORAL HEALTH - NSBHCHARTREVIEWVS_PSY_A_CORE FT
T(C): 37 (02-21-18 @ 07:00), Max: 37 (02-20-18 @ 18:00)  HR: 94 (02-21-18 @ 15:00) (84 - 101)  BP: 128/77 (02-21-18 @ 15:00) (103/59 - 134/71)  RR: 19 (02-21-18 @ 15:00) (15 - 25)  SpO2: 99% (02-21-18 @ 15:00) (96% - 100%)  Wt(kg): --

## 2018-02-21 NOTE — PROGRESS NOTE ADULT - ASSESSMENT
77 year old man with past medical history of AAA 5.5cm s/p EVAAR repair with stents on 1/29 with incidental finding of Right Renal Neoplasm. Pt transferred from [facility] after initially presenting with SOB which began last night. Patient states that he was discharged from the hospital following AAA repair and recovering well.  After going to bed, he was awoken from his sleep complaining of SOB with no relief.  He went to [facility] with his SOB getting progressively worse with 1 episode of vomitus before going to hospital.  At [place], patient with elevate d-dimers and transferred for possible PE.  At [facility], patient with  RICKI in V2-V4 with elevated Trops, and ADHF requiring Bipap. (04 Feb 2018 17:51). Pt found to have renal artery stenosis secondary to partial occlusion of renal artery from recent EVAR. Pt also in DONNIE. During work-up a cardiac cath revealed triple vessel disease and pt needed to be started on HD for worsening renal failure. It was deemed to high risk for a renal artery bypass so an attempt was made to stent the renal artery, unsuccessfully. 2/16 PermCath RCW placed for HD. 2/18 pt underwent high risk PCI w/ RICHARDSON x2 to LAD, RICHARDSON x1 to OM1, and POBA to D1.      Problem/Plan - 1:  ·  Problem: DONNIE (acute kidney injury).  Plan: Patient with DONNIE on CKD in setting of significant renal artery occlusion: Baseline creatinine is between 1.2-1.3. Scr. on presentation was 6.48. Complements noted to be WNL.  Patient was initiated on CVVHDF on 2/5/18 and transitioned to IHD. Patient is currently dialysis dependent. s/p tunneled HD catheter placement.   Monitor BMP daily.   F/u with nephro regarding HD today/Fistula placement/starting ACE/arb     Problem/Plan - 2:  ·  Problem: Hyperkalemia.  Plan: Serum potassium is 3.8 today. Monitor serum K level      Problem/Plan - 3:  ·  Problem: Other hypervolemia.  Plan: Monitor signs of fluid overload.      Problem/Plan - 4:  ·  Problem: Hyperphosphatemia.  Plan: In setting of renal failure:  C/w Renagel 1600 mg TID with meals. Monitor serum phosphorus level.      Problem/Plan - 5:  ·  Problem: HIT (heparin-induced thrombocytopenia).  Plan: INR today 2.03, c/w argatroban bridge till repeat INR 2-3,   Platelets have normalized.      Problem/Plan - 6:  Problem: NSTEMI (non-ST elevated myocardial infarction). Plan: -s/p cath with RICHARDSON x3 and POBA  -reevaluate need for triple therapy; ASA, Plavix, coumadin  -Increase Metoprolol to 100mg ER daily  -Decrease Hydralazine to 75mg   -Continue Statin, Isosorbide.     Problem/Plan - 7:  ·  Problem: Abdominal aortic aneurysm (AAA) without rupture.  Plan: Stable  Vascular surgery D/C staples.  Problem/Plan - 8:  ·  Problem: LUE fistula/pseudo aneurysm Plan: -U/S completed; awaiting vasc sx/interventional cardiology input. - No BPs/blood drawns left side.      Problem/Plan - 9:  ·  Problem: anemia.  Plan: In setting of renal failure: nephrology recommends start on epogen 4000 U TIW during dialysis. will check serum ferritin and iron studies to r/o iron deficiency. Monitor Hb.

## 2018-02-21 NOTE — PROGRESS NOTE BEHAVIORAL HEALTH - SUMMARY
The patient is a 77 year old  male who works as a ,  with 3 adult children, domiciled with his girlfriend, with no previous psychiatric history who is currently admitted due to NSTEMI, acute decompensated heart failure, and newly placed on dialysis following AAA repair in late January. Psychiatry was consulted due to confusion and agitation.  On interview, pt is AOx1, inattentive, waxing and waning, hypoactive although RN reports periods of agitation.  Pt lives independently at baseline, still works and drives, manages own finances.  Waxing and waning, better today.  Sx c/w delirium 2/2 C.

## 2018-02-21 NOTE — PROGRESS NOTE BEHAVIORAL HEALTH - NSBHCONSULTMEDS_PSY_A_CORE FT
1. D/c Depakote- family declining, they prefer a holistic approach.    2. Start Melatonin 3mg PO qHS PRN insomnia.    3. PRN: Ativan 1mg IV q6h PRN severe agitation (advise judicious use as may worsen delirium).  Decrease Valium to 5mg PO daily PRN anxiety.    4. Check: TSH, B12, folate, RPR.  Check CT head.    5. Minimize use of benzos, opioids, anticholinergics, or other deliriogenic agents when possible.  Maintain sleep wake cycle.  Provide frequent reorientation and redirection.  Family member at bedside if possible. Assess for need for glasses and hearing aid (if applicable).    6. Pt does not meet criteria for psychiatric hospitalization.  Recommend outpt psych f/u at [facility] after d/c- [phone].   [facility] Psych will follow.

## 2018-02-21 NOTE — PROGRESS NOTE BEHAVIORAL HEALTH - NSBHCHARTREVIEWLAB_PSY_A_CORE FT
7.2    7.8   )-----------( 230      ( 21 Feb 2018 04:19 )             21.5     02-21    139  |  97  |  34<H>  ----------------------------<  107<H>  3.8   |  28  |  4.43<H>    Ca    8.6      21 Feb 2018 04:19  Phos  3.3     02-21  Mg     2.4     02-21    TPro  6.8  /  Alb  2.9<L>  /  TBili  0.4  /  DBili  x   /  AST  35  /  ALT  20  /  AlkPhos  60  02-21

## 2018-02-21 NOTE — PROGRESS NOTE ADULT - SUBJECTIVE AND OBJECTIVE BOX
Patient is a 77y old  Male who presents with a chief complaint of Shortness of Breath (04 Feb 2018 17:51)      Overnight Event:    REVIEW OF SYSTEMS:  	    MEDICATIONS  (STANDING):  acetylcysteine 10% Inhalation 3 milliLiter(s) Inhalation every 6 hours  ALBUTerol/ipratropium for Nebulization 3 milliLiter(s) Nebulizer every 6 hours  aspirin enteric coated 81 milliGRAM(s) Oral daily  atorvastatin 80 milliGRAM(s) Oral at bedtime  clopidogrel Tablet 75 milliGRAM(s) Oral daily  docusate sodium 100 milliGRAM(s) Oral daily  hydrALAZINE 75 milliGRAM(s) Oral every 8 hours  isosorbide   dinitrate Tablet (ISORDIL) 20 milliGRAM(s) Oral three times a day  senna 1 Tablet(s) Oral at bedtime  sevelamer hydrochloride 1600 milliGRAM(s) Oral three times a day with meals  sodium chloride 0.9% lock flush 10 milliLiter(s) IV Push every 8 hours  warfarin 2.5 milliGRAM(s) Oral once    MEDICATIONS  (PRN):  diazepam    Tablet 2.5 milliGRAM(s) Oral daily PRN anxiety  polyethylene glycol 3350 17 Gram(s) Oral daily PRN Constipation        PHYSICAL EXAM:  Vital Signs Last 24 Hrs  T(C): 37 (21 Feb 2018 07:00), Max: 37 (20 Feb 2018 18:00)  T(F): 98.6 (21 Feb 2018 07:00), Max: 98.6 (20 Feb 2018 18:00)  HR: 94 (21 Feb 2018 09:00) (84 - 101)  BP: 108/62 (21 Feb 2018 09:00) (108/62 - 134/71)  BP(mean): 75 (21 Feb 2018 09:00) (75 - 90)  RR: 25 (21 Feb 2018 09:00) (12 - 25)  SpO2: 97% (21 Feb 2018 09:00) (96% - 100%)  I&O's Summary    20 Feb 2018 07:01  -  21 Feb 2018 07:00  --------------------------------------------------------  IN: 2152.2 mL / OUT: 2900 mL / NET: -747.8 mL    21 Feb 2018 07:01  -  21 Feb 2018 09:51  --------------------------------------------------------  IN: 60 mL / OUT: 0 mL / NET: 60 mL        Appearance: Normal	  HEENT:   Normal oral mucosa, PERRL, EOMI	  Lymphatic: No lymphadenopathy  Cardiovascular: Normal S1 S2, No JVD, No murmurs, No edema  Respiratory: Lungs clear to auscultation	  Psychiatry: A & O x 3, Mood & affect appropriate  Gastrointestinal:  Soft, Non-tender, + BS	  Skin: No rashes, No ecchymoses, No cyanosis	  Neurologic: Non-focal  Extremities: Normal range of motion, No clubbing, cyanosis or edema  Vascular: Peripheral pulses palpable 2+ bilaterally    LABS:	 	                        7.2    7.8   )-----------( 230      ( 21 Feb 2018 04:19 )             21.5     Auto Eosinophil # 0.1   / Auto Eosinophil % 1.2   / Auto Neutrophil # 6.5   / Auto Neutrophil % 82.8  / BANDS % x                            7.3    8.5   )-----------( 209      ( 20 Feb 2018 04:37 )             21.9     Auto Eosinophil # x     / Auto Eosinophil % x     / Auto Neutrophil # x     / Auto Neutrophil % x     / BANDS % x        INR: 2.03 ratio (02-21 @ 04:19)  INR: 1.76 ratio (02-20 @ 04:37)  INR: 1.90 ratio (02-19 @ 05:04)    02-21    139  |  97  |  34<H>  ----------------------------<  107<H>  3.8   |  28  |  4.43<H>  02-20    135  |  94<L>  |  68<H>  ----------------------------<  95  4.4   |  22  |  7.03<H>  02-19    135  |  92<L>  |  60<H>  ----------------------------<  168<H>  3.8   |  22  |  5.75<H>    Ca    8.6      21 Feb 2018 04:19  Mg     2.4     02-21  Phos  3.3     02-21  TPro  6.8  /  Alb  2.9<L>  /  TBili  0.4  /  DBili  x   /  AST  35  /  ALT  20  /  AlkPhos  60  02-21  TPro  6.8  /  Alb  2.8<L>  /  TBili  0.4  /  DBili  x   /  AST  37  /  ALT  23  /  AlkPhos  59  02-20  TPro  6.7  /  Alb  2.8<L>  /  TBili  0.3  /  DBili  x   /  AST  39  /  ALT  20  /  AlkPhos  62  02-19        proBNP:   Lipid Profile: 02-04 Chol 145 LDL 87 HDL 26<L> Trig 162<H>  HgA1c: 5.4 % (02-04 @ 21:37)    TSH:     CARDIAC MARKERS:   12 Feb 2018 02:21 Troponin 0.62 ng/mL / Creatine Kinase 61 U/L /  CKMB 2.7 ng/mL / CPK Mass Assay % x       05 Feb 2018 05:05 Troponin 0.73 ng/mL / Creatine Kinase 156 U/L /  CKMB 14.0 ng/mL / CPK Mass Assay % 9.0 %   05 Feb 2018 01:17 Troponin 0.80 ng/mL / Creatine Kinase 158 U/L /  CKMB 15.2 ng/mL / CPK Mass Assay % 9.6 %        TELEMETRY: 	    ECG:  	  RADIOLOGY:  OTHER: 	    PREVIOUS DIAGNOSTIC TESTING:    [ ] Echocardiogram:  [ ]  Catheterization:  [ ] Stress Test:  	  	  [facility]  Contact # Patient is a 77y old  Male who presents with a chief complaint of Shortness of Breath (04 Feb 2018 17:51)    HPI: 77 year old man with past medical history of AAA 5.5cm s/p EVAAR repair with stents on 1/29 with incidental finding of Right Renal Neoplasm, previous history of Left Renal Neoplasm with Left Nephrectomy in 2008, Bladder Cancer, Known LBBB, HTN, HLD transferred from [facility] after initially presenting with SOB which began last night. Patient states that he was discharged from the hospital following AAA repair and recovering well.  After going to bed, he was awoken from his sleep complaining of SOB with no relief.  He went to [facility] with his SOB getting progressively worse with 1 episode of vomitus before going to hospital.  At [place], patient with elevate d-dimers and transferred for possible PE.  At [facility], patient with  RICKI in V2-V4 with elevated Trops, and ADHF requiring Bipap. (04 Feb 2018 17:51). Pt found to have renal artery stenosis secondary to partial occlusion of renal artery from recent EVAR. Pt also in DONNIE. During work-up a cardiac cath revealed triple vessel disease and pt needed to be started on HD for worsening renal failure. It was deemed to high risk for a renal artery bypass so an attempt was made to stent the renal artery, unsuccessful. 2/16 perma cath RCW placed for HD. 2/18 pt underwent high risk PCI w/ RICHARDSON x2 to LAD, RICHARDSON x1 to OM1, and POBA to D1.     Overnight Event: LUE thrill noted by RN, pt denies pain to area. HD completed yesterday without incident.     REVIEW OF SYSTEMS:  "want to sleep", tired. Denies SOB, CP, N/V, dizziness. All other systems negative   	  MEDICATIONS  (STANDING):  acetylcysteine 10% Inhalation 3 milliLiter(s) Inhalation every 6 hours  ALBUTerol/ipratropium for Nebulization 3 milliLiter(s) Nebulizer every 6 hours  aspirin enteric coated 81 milliGRAM(s) Oral daily  atorvastatin 80 milliGRAM(s) Oral at bedtime  clopidogrel Tablet 75 milliGRAM(s) Oral daily  docusate sodium 100 milliGRAM(s) Oral daily  hydrALAZINE 75 milliGRAM(s) Oral every 8 hours  isosorbide   dinitrate Tablet (ISORDIL) 20 milliGRAM(s) Oral three times a day  senna 1 Tablet(s) Oral at bedtime  sevelamer hydrochloride 1600 milliGRAM(s) Oral three times a day with meals  sodium chloride 0.9% lock flush 10 milliLiter(s) IV Push every 8 hours  warfarin 2.5 milliGRAM(s) Oral once    MEDICATIONS  (PRN):  diazepam    Tablet 2.5 milliGRAM(s) Oral daily PRN anxiety  polyethylene glycol 3350 17 Gram(s) Oral daily PRN Constipation    PHYSICAL EXAM:  Vital Signs Last 24 Hrs  T(C): 37 (21 Feb 2018 07:00), Max: 37 (20 Feb 2018 18:00)  T(F): 98.6 (21 Feb 2018 07:00), Max: 98.6 (20 Feb 2018 18:00)  HR: 94 (21 Feb 2018 09:00) (84 - 101)  BP: 108/62 (21 Feb 2018 09:00) (108/62 - 134/71)  BP(mean): 75 (21 Feb 2018 09:00) (75 - 90)  RR: 25 (21 Feb 2018 09:00) (12 - 25)  SpO2: 97% (21 Feb 2018 09:00) (96% - 100%)  I&O's Summary    20 Feb 2018 07:01  -  21 Feb 2018 07:00  --------------------------------------------------------  IN: 2152.2 mL / OUT: 2900 mL / NET: -747.8 mL    21 Feb 2018 07:01  -  21 Feb 2018 09:51  --------------------------------------------------------  IN: 60 mL / OUT: 0 mL / NET: 60 mL    Appearance: Awake, sitting up in bed, speaking in full sentences. 	  HEENT:   Normal oral mucosa, PERRL, EOMI	  Lymphatic: No lymphadenopathy  Cardiovascular: Normal S1 S2, No JVD, No murmurs, No edema  Respiratory: Lungs clear to auscultation	  Psychiatry: A & O x 2, waxing/waning agitation/flat affect.   Gastrointestinal:  Soft, Non-tender, + BS	  Skin: No rashes, No ecchymoses, No cyanosis	  Neurologic: Non-focal  Extremities: Normal range of motion, No clubbing, cyanosis or edema  Vascular: L UE thrill, L IJ D/I, Radial a-line removed site D/I, Peripheral pulses palpable 2+ bilaterally      LABS:	 	                    7.2    7.8   )-----------( 230      ( 21 Feb 2018 04:19 )             21.5     Auto Eosinophil # 0.1   / Auto Eosinophil % 1.2   / Auto Neutrophil # 6.5   / Auto Neutrophil % 82.8  / BANDS % x                            7.3    8.5   )-----------( 209      ( 20 Feb 2018 04:37 )             21.9     Auto Eosinophil # x     / Auto Eosinophil % x     / Auto Neutrophil # x     / Auto Neutrophil % x     / BANDS % x        INR: 2.03 ratio (02-21 @ 04:19)  INR: 1.76 ratio (02-20 @ 04:37)  INR: 1.90 ratio (02-19 @ 05:04)    02-21    139  |  97  |  34<H>  ----------------------------<  107<H>  3.8   |  28  |  4.43<H>  02-20    135  |  94<L>  |  68<H>  ----------------------------<  95  4.4   |  22  |  7.03<H>  02-19    135  |  92<L>  |  60<H>  ----------------------------<  168<H>  3.8   |  22  |  5.75<H>    Ca    8.6      21 Feb 2018 04:19  Mg     2.4     02-21  Phos  3.3     02-21  TPro  6.8  /  Alb  2.9<L>  /  TBili  0.4  /  DBili  x   /  AST  35  /  ALT  20  /  AlkPhos  60  02-21  TPro  6.8  /  Alb  2.8<L>  /  TBili  0.4  /  DBili  x   /  AST  37  /  ALT  23  /  AlkPhos  59  02-20  TPro  6.7  /  Alb  2.8<L>  /  TBili  0.3  /  DBili  x   /  AST  39  /  ALT  20  /  AlkPhos  62  02-19        proBNP:   Lipid Profile: 02-04 Chol 145 LDL 87 HDL 26<L> Trig 162<H>  HgA1c: 5.4 % (02-04 @ 21:37)    TSH:     CARDIAC MARKERS:   12 Feb 2018 02:21 Troponin 0.62 ng/mL / Creatine Kinase 61 U/L /  CKMB 2.7 ng/mL / CPK Mass Assay % x       05 Feb 2018 05:05 Troponin 0.73 ng/mL / Creatine Kinase 156 U/L /  CKMB 14.0 ng/mL / CPK Mass Assay % 9.0 %   05 Feb 2018 01:17 Troponin 0.80 ng/mL / Creatine Kinase 158 U/L /  CKMB 15.2 ng/mL / CPK Mass Assay % 9.6 %        TELEMETRY: NSR, no ectopy	    ECG: NSR; QTc 497ms  	    PREVIOUS DIAGNOSTIC TESTING:    [ ] Echocardiogram:  [ ]  Catheterization:  [ ] Stress Test:  	  	  COURT Freeman  Contact #

## 2018-02-21 NOTE — PROGRESS NOTE ADULT - PROBLEM SELECTOR PLAN 4
In setting of renal failure: Latest Hb is 7.2. Will start on epogen 4000 U TIW during dialysis. Please check serum ferritin and iron studies to r/o iron deficiency. Monitor Hb.

## 2018-02-21 NOTE — PROGRESS NOTE BEHAVIORAL HEALTH - NSBHFUPINTERVALHXFT_PSY_A_CORE
Pt AOx2, more alert, attentive, and responsive.  Was found lying comfortably in bed with his daughter and sister. During the course of the interview, the patient had repeated several questions that had been discussed immediately prior. He states that he feels angry that he’s still in the hospital but is otherwise happy. He states that he has been sleeping poorly and wants medication to assist with sleep. He otherwise has no other psychiatric complaints and denies auditory/visual hallucinations and SI/HI.  Collateral history was obtained from the patient’s daughter. She believes that he has been doing better and is starting to joke more which is consistent with his normal self. She believes that he has been sleeping for 5 hour intervals throughout the day and is unable to sleep at night. In addition she and her sister, who is a holistic care provider, are concerned that valproic acid may cause mood disturbances. For this reason, they do not want their father to take the medication and request to wait and see how their father is doing without medications.

## 2018-02-22 ENCOUNTER — TRANSCRIPTION ENCOUNTER (OUTPATIENT)
Age: 78
End: 2018-02-22

## 2018-02-22 LAB
ALBUMIN SERPL ELPH-MCNC: 2.7 G/DL — LOW (ref 3.3–5)
ALP SERPL-CCNC: 55 U/L — SIGNIFICANT CHANGE UP (ref 40–120)
ALT FLD-CCNC: 19 U/L RC — SIGNIFICANT CHANGE UP (ref 10–45)
ANION GAP SERPL CALC-SCNC: 14 MMOL/L — SIGNIFICANT CHANGE UP (ref 5–17)
APTT BLD: 60.1 SEC — HIGH (ref 27.5–37.4)
APTT BLD: 60.4 SEC — HIGH (ref 27.5–37.4)
AST SERPL-CCNC: 27 U/L — SIGNIFICANT CHANGE UP (ref 10–40)
BASOPHILS # BLD AUTO: 0 K/UL — SIGNIFICANT CHANGE UP (ref 0–0.2)
BASOPHILS # BLD AUTO: 0 K/UL — SIGNIFICANT CHANGE UP (ref 0–0.2)
BASOPHILS # BLD AUTO: 0.1 K/UL — SIGNIFICANT CHANGE UP (ref 0–0.2)
BASOPHILS NFR BLD AUTO: 0.4 % — SIGNIFICANT CHANGE UP (ref 0–2)
BASOPHILS NFR BLD AUTO: 0.5 % — SIGNIFICANT CHANGE UP (ref 0–2)
BASOPHILS NFR BLD AUTO: 0.8 % — SIGNIFICANT CHANGE UP (ref 0–2)
BILIRUB SERPL-MCNC: 0.4 MG/DL — SIGNIFICANT CHANGE UP (ref 0.2–1.2)
BLD GP AB SCN SERPL QL: NEGATIVE — SIGNIFICANT CHANGE UP
BUN SERPL-MCNC: 60 MG/DL — HIGH (ref 7–23)
CALCIUM SERPL-MCNC: 8.4 MG/DL — SIGNIFICANT CHANGE UP (ref 8.4–10.5)
CHLORIDE SERPL-SCNC: 93 MMOL/L — LOW (ref 96–108)
CO2 SERPL-SCNC: 26 MMOL/L — SIGNIFICANT CHANGE UP (ref 22–31)
CREAT SERPL-MCNC: 6.7 MG/DL — HIGH (ref 0.5–1.3)
EOSINOPHIL # BLD AUTO: 0.1 K/UL — SIGNIFICANT CHANGE UP (ref 0–0.5)
EOSINOPHIL NFR BLD AUTO: 0.7 % — SIGNIFICANT CHANGE UP (ref 0–6)
EOSINOPHIL NFR BLD AUTO: 0.8 % — SIGNIFICANT CHANGE UP (ref 0–6)
EOSINOPHIL NFR BLD AUTO: 1 % — SIGNIFICANT CHANGE UP (ref 0–6)
FERRITIN SERPL-MCNC: 556 NG/ML — HIGH (ref 30–400)
GLUCOSE SERPL-MCNC: 98 MG/DL — SIGNIFICANT CHANGE UP (ref 70–99)
HCT VFR BLD CALC: 19 % — CRITICAL LOW (ref 39–50)
HCT VFR BLD CALC: 19.7 % — CRITICAL LOW (ref 39–50)
HCT VFR BLD CALC: 23.6 % — LOW (ref 39–50)
HGB BLD-MCNC: 6.6 G/DL — CRITICAL LOW (ref 13–17)
HGB BLD-MCNC: 6.8 G/DL — CRITICAL LOW (ref 13–17)
HGB BLD-MCNC: 8.1 G/DL — LOW (ref 13–17)
INR BLD: 3.12 RATIO — HIGH (ref 0.88–1.16)
INR BLD: 3.66 RATIO — HIGH (ref 0.88–1.16)
IRON SATN MFR SERPL: 15 % — LOW (ref 16–55)
IRON SATN MFR SERPL: 34 UG/DL — LOW (ref 45–165)
LYMPHOCYTES # BLD AUTO: 0.7 K/UL — LOW (ref 1–3.3)
LYMPHOCYTES # BLD AUTO: 0.8 K/UL — LOW (ref 1–3.3)
LYMPHOCYTES # BLD AUTO: 0.9 K/UL — LOW (ref 1–3.3)
LYMPHOCYTES # BLD AUTO: 10.7 % — LOW (ref 13–44)
LYMPHOCYTES # BLD AUTO: 9.5 % — LOW (ref 13–44)
LYMPHOCYTES # BLD AUTO: 9.9 % — LOW (ref 13–44)
MAGNESIUM SERPL-MCNC: 2.6 MG/DL — SIGNIFICANT CHANGE UP (ref 1.6–2.6)
MCHC RBC-ENTMCNC: 31.4 PG — SIGNIFICANT CHANGE UP (ref 27–34)
MCHC RBC-ENTMCNC: 32.2 PG — SIGNIFICANT CHANGE UP (ref 27–34)
MCHC RBC-ENTMCNC: 32.6 PG — SIGNIFICANT CHANGE UP (ref 27–34)
MCHC RBC-ENTMCNC: 34.5 GM/DL — SIGNIFICANT CHANGE UP (ref 32–36)
MCHC RBC-ENTMCNC: 34.5 GM/DL — SIGNIFICANT CHANGE UP (ref 32–36)
MCHC RBC-ENTMCNC: 34.7 GM/DL — SIGNIFICANT CHANGE UP (ref 32–36)
MCV RBC AUTO: 91.2 FL — SIGNIFICANT CHANGE UP (ref 80–100)
MCV RBC AUTO: 93.5 FL — SIGNIFICANT CHANGE UP (ref 80–100)
MCV RBC AUTO: 93.7 FL — SIGNIFICANT CHANGE UP (ref 80–100)
MONOCYTES # BLD AUTO: 0.6 K/UL — SIGNIFICANT CHANGE UP (ref 0–0.9)
MONOCYTES # BLD AUTO: 0.7 K/UL — SIGNIFICANT CHANGE UP (ref 0–0.9)
MONOCYTES # BLD AUTO: 0.7 K/UL — SIGNIFICANT CHANGE UP (ref 0–0.9)
MONOCYTES NFR BLD AUTO: 8 % — SIGNIFICANT CHANGE UP (ref 2–14)
MONOCYTES NFR BLD AUTO: 8.2 % — SIGNIFICANT CHANGE UP (ref 2–14)
MONOCYTES NFR BLD AUTO: 9.1 % — SIGNIFICANT CHANGE UP (ref 2–14)
NEUTROPHILS # BLD AUTO: 6.2 K/UL — SIGNIFICANT CHANGE UP (ref 1.8–7.4)
NEUTROPHILS # BLD AUTO: 6.4 K/UL — SIGNIFICANT CHANGE UP (ref 1.8–7.4)
NEUTROPHILS # BLD AUTO: 6.4 K/UL — SIGNIFICANT CHANGE UP (ref 1.8–7.4)
NEUTROPHILS NFR BLD AUTO: 79.3 % — HIGH (ref 43–77)
NEUTROPHILS NFR BLD AUTO: 80.1 % — HIGH (ref 43–77)
NEUTROPHILS NFR BLD AUTO: 81.1 % — HIGH (ref 43–77)
PHOSPHATE SERPL-MCNC: 5.2 MG/DL — HIGH (ref 2.5–4.5)
PLATELET # BLD AUTO: 244 K/UL — SIGNIFICANT CHANGE UP (ref 150–400)
PLATELET # BLD AUTO: 253 K/UL — SIGNIFICANT CHANGE UP (ref 150–400)
PLATELET # BLD AUTO: 259 K/UL — SIGNIFICANT CHANGE UP (ref 150–400)
POTASSIUM SERPL-MCNC: 4.1 MMOL/L — SIGNIFICANT CHANGE UP (ref 3.5–5.3)
POTASSIUM SERPL-SCNC: 4.1 MMOL/L — SIGNIFICANT CHANGE UP (ref 3.5–5.3)
PROT SERPL-MCNC: 6.4 G/DL — SIGNIFICANT CHANGE UP (ref 6–8.3)
PROTHROM AB SERPL-ACNC: 34.5 SEC — HIGH (ref 9.8–12.7)
PROTHROM AB SERPL-ACNC: 40.6 SEC — HIGH (ref 9.8–12.7)
RBC # BLD: 2.02 M/UL — LOW (ref 4.2–5.8)
RBC # BLD: 2.11 M/UL — LOW (ref 4.2–5.8)
RBC # BLD: 2.59 M/UL — LOW (ref 4.2–5.8)
RBC # FLD: 12.7 % — SIGNIFICANT CHANGE UP (ref 10.3–14.5)
RBC # FLD: 12.8 % — SIGNIFICANT CHANGE UP (ref 10.3–14.5)
RBC # FLD: 13.5 % — SIGNIFICANT CHANGE UP (ref 10.3–14.5)
RH IG SCN BLD-IMP: POSITIVE — SIGNIFICANT CHANGE UP
SODIUM SERPL-SCNC: 133 MMOL/L — LOW (ref 135–145)
TIBC SERPL-MCNC: 220 UG/DL — SIGNIFICANT CHANGE UP (ref 220–430)
UIBC SERPL-MCNC: 186 UG/DL — SIGNIFICANT CHANGE UP (ref 110–370)
WBC # BLD: 7.8 K/UL — SIGNIFICANT CHANGE UP (ref 3.8–10.5)
WBC # BLD: 7.9 K/UL — SIGNIFICANT CHANGE UP (ref 3.8–10.5)
WBC # BLD: 8.1 K/UL — SIGNIFICANT CHANGE UP (ref 3.8–10.5)
WBC # FLD AUTO: 7.8 K/UL — SIGNIFICANT CHANGE UP (ref 3.8–10.5)
WBC # FLD AUTO: 7.9 K/UL — SIGNIFICANT CHANGE UP (ref 3.8–10.5)
WBC # FLD AUTO: 8.1 K/UL — SIGNIFICANT CHANGE UP (ref 3.8–10.5)

## 2018-02-22 PROCEDURE — 99291 CRITICAL CARE FIRST HOUR: CPT

## 2018-02-22 PROCEDURE — 99233 SBSQ HOSP IP/OBS HIGH 50: CPT

## 2018-02-22 PROCEDURE — 93010 ELECTROCARDIOGRAM REPORT: CPT

## 2018-02-22 RX ORDER — METOPROLOL TARTRATE 50 MG
75 TABLET ORAL DAILY
Qty: 0 | Refills: 0 | Status: DISCONTINUED | OUTPATIENT
Start: 2018-02-22 | End: 2018-02-22

## 2018-02-22 RX ORDER — METOPROLOL TARTRATE 50 MG
100 TABLET ORAL DAILY
Qty: 0 | Refills: 0 | Status: DISCONTINUED | OUTPATIENT
Start: 2018-02-22 | End: 2018-02-24

## 2018-02-22 RX ADMIN — SEVELAMER CARBONATE 1600 MILLIGRAM(S): 2400 POWDER, FOR SUSPENSION ORAL at 17:16

## 2018-02-22 RX ADMIN — Medication 100 MILLIGRAM(S): at 11:09

## 2018-02-22 RX ADMIN — ARGATROBAN 8.16 MICROGRAM(S)/KG/MIN: 50 INJECTION, SOLUTION INTRAVENOUS at 00:30

## 2018-02-22 RX ADMIN — Medication 3 MILLILITER(S): at 06:25

## 2018-02-22 RX ADMIN — SEVELAMER CARBONATE 1600 MILLIGRAM(S): 2400 POWDER, FOR SUSPENSION ORAL at 08:56

## 2018-02-22 RX ADMIN — Medication 75 MILLIGRAM(S): at 11:05

## 2018-02-22 RX ADMIN — Medication 81 MILLIGRAM(S): at 11:09

## 2018-02-22 RX ADMIN — Medication 3 MILLILITER(S): at 17:34

## 2018-02-22 RX ADMIN — SENNA PLUS 1 TABLET(S): 8.6 TABLET ORAL at 22:16

## 2018-02-22 RX ADMIN — Medication 3 MILLILITER(S): at 23:15

## 2018-02-22 RX ADMIN — ATORVASTATIN CALCIUM 80 MILLIGRAM(S): 80 TABLET, FILM COATED ORAL at 22:16

## 2018-02-22 RX ADMIN — Medication 2.5 MILLIGRAM(S): at 22:59

## 2018-02-22 RX ADMIN — Medication 3 MILLILITER(S): at 12:15

## 2018-02-22 RX ADMIN — CLOPIDOGREL BISULFATE 75 MILLIGRAM(S): 75 TABLET, FILM COATED ORAL at 11:09

## 2018-02-22 RX ADMIN — Medication 3 MILLIGRAM(S): at 22:16

## 2018-02-22 RX ADMIN — SEVELAMER CARBONATE 1600 MILLIGRAM(S): 2400 POWDER, FOR SUSPENSION ORAL at 13:16

## 2018-02-22 RX ADMIN — Medication 3 MILLILITER(S): at 12:14

## 2018-02-22 RX ADMIN — ERYTHROPOIETIN 4000 UNIT(S): 10000 INJECTION, SOLUTION INTRAVENOUS; SUBCUTANEOUS at 13:23

## 2018-02-22 NOTE — PROVIDER CONTACT NOTE (CRITICAL VALUE NOTIFICATION) - RECOMMENDATIONS
Consider blood transfusion
Repeat CBC and send type and screen
Repeat CBC sent.  T&S sent.  Will continue to monitor.

## 2018-02-22 NOTE — DISCHARGE NOTE ADULT - HOSPITAL COURSE
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 Pt s/p LHC w/ TVD and R WOOD. Renal artery unable to open. Pt was stented. Permacath placed by IR on 2/16 and family educated on AV fisulta. Pt will be dialysis dependent and will be discharged to rehab. 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 Pt s/p LHC w/ TVD and R WOOD. Renal artery unable to open. Pt s/p LHC 2/18 w/ atherectomy & RICHARDSON x 2 pLAD, RICHARDSON x 1 OM1, POBA D1 . Permacath placed by IR on 2/16 and family educated on AV fisulta. Pt will be dialysis dependent and will be discharged to rehab.

## 2018-02-22 NOTE — PROGRESS NOTE ADULT - SUBJECTIVE AND OBJECTIVE BOX
[facility] DIVISION OF KIDNEY DISEASES AND HYPERTENSION -- PROGRESS NOTE    Chief complaint: DONNIE, now ESRD on HD    24 hour events/subjective: no acute events        PAST HISTORY  --------------------------------------------------------------------------------  No significant changes to PMH, PSH, FHx, SHx, unless otherwise noted    ALLERGIES & MEDICATIONS  --------------------------------------------------------------------------------  Allergies    Cipro (Other)  heparin (Other (Severe))  Levaquin (Other)  penicillin (Hives)    Intolerances      Standing Inpatient Medications  acetylcysteine 10% Inhalation 3 milliLiter(s) Inhalation every 6 hours  ALBUTerol/ipratropium for Nebulization 3 milliLiter(s) Nebulizer every 6 hours  aspirin enteric coated 81 milliGRAM(s) Oral daily  atorvastatin 80 milliGRAM(s) Oral at bedtime  clopidogrel Tablet 75 milliGRAM(s) Oral daily  docusate sodium 100 milliGRAM(s) Oral daily  epoetin gregoria Injectable 4000 Unit(s) IV Push <User Schedule>  lisinopril 10 milliGRAM(s) Oral daily  melatonin 3 milliGRAM(s) Oral at bedtime  metoprolol succinate  milliGRAM(s) Oral daily  senna 1 Tablet(s) Oral at bedtime  sevelamer hydrochloride 1600 milliGRAM(s) Oral three times a day with meals    PRN Inpatient Medications  diazepam    Tablet 2.5 milliGRAM(s) Oral daily PRN      REVIEW OF SYSTEMS  --------------------------------------------------------------------------------  Constitutional: [x ] no Fever [ ] Chills [ ] Fatigue [ ] Weight change   HEENT: [ ] Blurred vision [ ] Eye Pain [ ] Headache [ ] Runny nose [ ] Sore Throat   Respiratory: [ ] Cough [ ] Wheezing [x ] no Shortness of breath  Cardiovascular: [x ] no Chest Pain [ ] Palpitations [ ] GORDON [ ] PND [ ] Orthopnea  Gastrointestinal: [ ] Abdominal Pain [ ] Diarrhea [ ] Constipation [ ] Hemorrhoids [ ] Nausea [ ] Vomiting  Genitourinary: [ ] Nocturia [ ] Dysuria [ ] Incontinence  Extremities: [ ] Swelling [ ] Joint Pain  Neurologic: [ ] Focal deficit [ ] Paresthesias [ ] Syncope  Lymphatic: [ ] Swelling [ ] Lymphadenopathy   Skin: [ ] Rash [ ] Ecchymoses [ ] Wounds [ ] Lesions  Psychiatry: [ ] Depression [ ] Suicidal/Homicidal Ideation [ ] Anxiety [ ] Sleep Disturbances  [x ] 10 point review of systems is otherwise negative except as mentioned above              [ ]Unable to obtain due to   All other systems were reviewed and are negative, except as noted.    VITALS/PHYSICAL EXAM  --------------------------------------------------------------------------------  T(C): 36.5 (02-22-18 @ 03:00), Max: 37 (02-21-18 @ 19:00)  HR: 83 (02-22-18 @ 06:40) (76 - 97)  BP: 93/54 (02-22-18 @ 06:40) (79/51 - 128/79)  RR: 20 (02-22-18 @ 06:40) (14 - 22)  SpO2: 99% (02-22-18 @ 06:40) (92% - 100%)  Wt(kg): --        02-21-18 @ 07:01  -  02-22-18 @ 07:00  --------------------------------------------------------  IN: 539.3 mL / OUT: 0 mL / NET: 539.3 mL      Physical Exam:  	Gen: NAD, well-appearing  	HEENT: on room air  	Pulm: CTA B/L  	CV: normal S1S2; no rub  	Abd: soft                      Back : No sacral edema  	: No hall  	LE: no edema  	Skin: Warm, without rashes  	Vascular access: RIJ tunnelled dialysis catheter present, no bleed/ swelling/ discharge around the catheter    LABS/STUDIES  --------------------------------------------------------------------------------              6.8    8.1   >-----------<  259      [02-22-18 @ 06:07]              19.7     133  |  93  |  60  ----------------------------<  98      [02-22-18 @ 05:32]  4.1   |  26  |  6.70        Ca     8.4     [02-22-18 @ 05:32]      Mg     2.6     [02-22-18 @ 05:32]      Phos  5.2     [02-22-18 @ 05:32]    TPro  6.4  /  Alb  2.7  /  TBili  0.4  /  DBili  x   /  AST  27  /  ALT  19  /  AlkPhos  55  [02-22-18 @ 05:32]    PT/INR: PT 40.6 , INR 3.66       [02-22-18 @ 05:32]  PTT: 60.1       [02-22-18 @ 05:32]      Creatinine Trend:  SCr 6.70 [02-22 @ 05:32]  SCr 4.43 [02-21 @ 04:19]  SCr 7.03 [02-20 @ 04:37]  SCr 5.75 [02-19 @ 15:22]  SCr 8.28 [02-19 @ 05:04]    Urinalysis - [02-04-18 @ 13:17]      Color Yellow / Appearance Clear / SG 1.020 / pH 5.0      Gluc 50 / Ketone Negative  / Bili Negative / Urobili Negative       Blood Trace / Protein 30 / Leuk Est Trace / Nitrite Negative      RBC 0-2 / WBC 0-2 / Hyaline  / Gran  / Sq Epi  / Non Sq Epi Few / Bacteria Trace      Iron 34, TIBC 220, %sat 15      [02-22-18 @ 07:32]  HbA1c 5.4      [02-04-18 @ 21:37]  TSH 3.08      [02-04-18 @ 21:37]  Lipid: chol 145, , HDL 26, LDL 87      [02-04-18 @ 21:37]    HBsAb <3.0      [02-07-18 @ 23:02]  HBsAg Nonreact      [02-07-18 @ 23:02]  HCV 0.11, Nonreact      [02-07-18 @ 23:02]    C3 Complement 157      [02-05-18 @ 21:15]  C4 Complement 35      [02-05-18 @ 21:15]

## 2018-02-22 NOTE — DISCHARGE NOTE ADULT - INSTRUCTIONS
Choose lean meats and poultry without skin and prepare them without added saturated and trans fat.  Eat fish at least twice a week. Recent research shows that eating oily fish containing omega-3 fatty acids (for example, salmon, trout and herring) may help lower your risk of death from coronary artery disease.  Select fat-free, 1 percent fat and low-fat dairy products.  Cut back on foods containing partially hydrogenated vegetable oils to reduce trans fat in your diet.   To lower cholesterol, reduce saturated fat to no more than 5 to 6 percent of total calories. For someone eating 2,000 calories a day, that’s about 13 grams of saturated fat.  Cut back on beverages and foods with added sugars.  Choose and prepare foods with little or no salt. To lower blood pressure, aim to eat no more than 2,400 milligrams of sodium per day. Reducing daily intake to 1,500 mg is desirable because it can lower blood pressure even further. Do not eat or drink foods containing more than 2000mg of salt (sodium) in your diet every day. Limit your fluid intake.  Follow the American Heart Association recommendations when you eat out, and keep an eye on your portion sizes.

## 2018-02-22 NOTE — PROGRESS NOTE ADULT - SUBJECTIVE AND OBJECTIVE BOX
Patient is a 77y old  Male who presents with a chief complaint of Shortness of Breath (04 Feb 2018 17:51)    HPI:  This is a 77 year old man with past medical history of AAA 5.5cm s/p EVAAR repair with stents on 1/29 with incidental finding of Right Renal Neoplasm, previous history of Left Renal Neoplasm with Left Nephrectomy in 2008, Bladder Cancer, Known LBBB, HTN, HLD transferred from [facility] after initially presenting with SOB which began last night. Patient states that he was discharged from the hospital following AAA repair and recovering well.  He was walking with walker at home with no complaints.  After going to bed, he was awoken from his sleep complaining of SOB with no relief.  He went to [facility] wtih his SOB getting progressively worse with 1 episode of vomitus before going to hospital.  Patient denies fever, chills, recent sick contact, Chest pain, Abdominal pain, diarrhea.  At [place], paient with elevate d-dimers and transferred for possible PE.  At [facility], patient with  RICKI in V2-V4 with elevated Trops, and ADHF requiring Bipap. (04 Feb 2018 17:51)    Overnight Event:    REVIEW OF SYSTEMS:  CONSTITUTIONAL: No weakness, fevers or chills  Eyes/ENT: No visual changes: No vertigo or throat pain  NECK: No pain or stiffness  Resp: No cough, wheezing, hemoptysis; No shortness of breath  Cardiovascular: No chest pain or palpitations  GI: No abdominal or epigastric pain. NO nausea, vomiting, or hematemesis: No diarrhea or constipation. No melena or hematochezia.    : No dysuria, frequent or hematuria.  Neuro: No numbness or weakness  Skin: No itching or rashes	    MEDICATIONS  (STANDING):  acetylcysteine 10% Inhalation 3 milliLiter(s) Inhalation every 6 hours  ALBUTerol/ipratropium for Nebulization 3 milliLiter(s) Nebulizer every 6 hours  aspirin enteric coated 81 milliGRAM(s) Oral daily  atorvastatin 80 milliGRAM(s) Oral at bedtime  clopidogrel Tablet 75 milliGRAM(s) Oral daily  docusate sodium 100 milliGRAM(s) Oral daily  epoetin gregoria Injectable 4000 Unit(s) IV Push <User Schedule>  lisinopril 10 milliGRAM(s) Oral daily  melatonin 3 milliGRAM(s) Oral at bedtime  metoprolol succinate  milliGRAM(s) Oral daily  senna 1 Tablet(s) Oral at bedtime  sevelamer hydrochloride 1600 milliGRAM(s) Oral three times a day with meals    MEDICATIONS  (PRN):  diazepam    Tablet 2.5 milliGRAM(s) Oral daily PRN anxiety        PHYSICAL EXAM:  Vital Signs Last 24 Hrs  T(C): 36.5 (22 Feb 2018 03:00), Max: 37 (21 Feb 2018 19:00)  T(F): 97.7 (22 Feb 2018 03:00), Max: 98.6 (21 Feb 2018 19:00)  HR: 83 (22 Feb 2018 06:40) (76 - 97)  BP: 93/54 (22 Feb 2018 06:40) (79/51 - 128/79)  BP(mean): 68 (22 Feb 2018 06:40) (57 - 94)  RR: 20 (22 Feb 2018 06:40) (14 - 25)  SpO2: 99% (22 Feb 2018 06:40) (92% - 100%)  I&O's Summary    21 Feb 2018 07:01  -  22 Feb 2018 07:00  --------------------------------------------------------  IN: 539.3 mL / OUT: 0 mL / NET: 539.3 mL        Appearance: Normal	  HEENT:   Normal oral mucosa, PERRL, EOMI	  Lymphatic: No lymphadenopathy  Cardiovascular: Normal S1 S2, No JVD, No murmurs, No edema  Respiratory: Lungs clear to auscultation	  Psychiatry: A & O x 3, Mood & affect appropriate  Gastrointestinal:  Soft, Non-tender, + BS	  Skin: No rashes, No ecchymoses, No cyanosis	  Neurologic: Non-focal  Extremities: Normal range of motion, No clubbing, cyanosis or edema  Vascular: Peripheral pulses palpable 2+ bilaterally    LABS:	 	                        6.8    8.1   )-----------( 259      ( 22 Feb 2018 06:07 )             19.7     Auto Eosinophil # 0.1   / Auto Eosinophil % 1.0   / Auto Neutrophil # 6.4   / Auto Neutrophil % 79.3  / BANDS % x                            6.6    7.9   )-----------( 253      ( 22 Feb 2018 05:32 )             19.0     Auto Eosinophil # 0.1   / Auto Eosinophil % 0.7   / Auto Neutrophil # 6.4   / Auto Neutrophil % 81.1  / BANDS % x                            7.2    7.8   )-----------( 230      ( 21 Feb 2018 04:19 )             21.5     Auto Eosinophil # 0.1   / Auto Eosinophil % 1.2   / Auto Neutrophil # 6.5   / Auto Neutrophil % 82.8  / BANDS % x        INR: 3.66 ratio (02-22 @ 05:32)  INR: 3.12 ratio (02-21 @ 23:28)  INR: 2.03 ratio (02-21 @ 04:19)    02-22    133<L>  |  93<L>  |  60<H>  ----------------------------<  98  4.1   |  26  |  6.70<H>  02-21    139  |  97  |  34<H>  ----------------------------<  107<H>  3.8   |  28  |  4.43<H>    Ca    8.4      22 Feb 2018 05:32  Mg     2.6     02-22  Phos  5.2     02-22  TPro  6.4  /  Alb  2.7<L>  /  TBili  0.4  /  DBili  x   /  AST  27  /  ALT  19  /  AlkPhos  55  02-22  TPro  6.8  /  Alb  2.9<L>  /  TBili  0.4  /  DBili  x   /  AST  35  /  ALT  20  /  AlkPhos  60  02-21        proBNP:   Lipid Profile: 02-04 Chol 145 LDL 87 HDL 26<L> Trig 162<H>  HgA1c: 5.4 % (02-04 @ 21:37)    TSH:     CARDIAC MARKERS:   12 Feb 2018 02:21 Troponin 0.62 ng/mL / Creatine Kinase 61 U/L /  CKMB 2.7 ng/mL / CPK Mass Assay % x       05 Feb 2018 05:05 Troponin 0.73 ng/mL / Creatine Kinase 156 U/L /  CKMB 14.0 ng/mL / CPK Mass Assay % 9.0 %   05 Feb 2018 01:17 Troponin 0.80 ng/mL / Creatine Kinase 158 U/L /  CKMB 15.2 ng/mL / CPK Mass Assay % 9.6 %        TELEMETRY: 	    ECG:  	  RADIOLOGY:  OTHER: 	    PREVIOUS DIAGNOSTIC TESTING:    [ ] Echocardiogram:  [ ]  Catheterization:  [ ] Stress Test:  	  	  Jose METZ-c  Contact # Patient is a 77y old  Male who presents with a chief complaint of Shortness of Breath (04 Feb 2018 17:51)    HPI:  This is a 77 year old man with past medical history of AAA 5.5cm s/p EVAAR repair with stents on 1/29 with incidental finding of Right Renal Neoplasm, previous history of Left Renal Neoplasm with Left Nephrectomy in 2008, Bladder Cancer, Known LBBB, HTN, HLD transferred from [facility] after initially presenting with SOB which began last night. Patient states that he was discharged from the hospital following AAA repair and recovering well.  He was walking with walker at home with no complaints.  After going to bed, he was awoken from his sleep complaining of SOB with no relief.  He went to [facility] wtih his SOB getting progressively worse with 1 episode of vomitus before going to hospital.  Patient denies fever, chills, recent sick contact, Chest pain, Abdominal pain, diarrhea.  At [place], paient with elevate d-dimers and transferred for possible PE.  At [facility], patient with  RICKI in V2-V4 with elevated Trops, and ADHF requiring Bipap. (04 Feb 2018 17:51) Pt found to have renal artery stenosis secondary to partial occlusion of renal artery from recent EVAR. Pt also in DONNIE. During work-up a cardiac cath revealed triple vessel disease and pt needed to be started on HD for worsening renal failure. It was deemed to high risk for a renal artery bypass so an attempt was made to stent the renal artery, unsuccessful. 2/16 perma cath RCW placed for HD. 2/18 pt underwent high risk PCI w/ RICHARDSON x2 to LAD, RICHARDSON x1 to OM1, and POBA to D1.       Overnight Event: H/H drop overnight    REVIEW OF SYSTEMS:  CONSTITUTIONAL: No weakness, fevers or chills  Eyes/ENT: No visual changes: No vertigo or throat pain  NECK: No pain or stiffness  Resp: No cough, wheezing, hemoptysis; No shortness of breath  Cardiovascular: No chest pain or palpitations  GI: No abdominal or epigastric pain. NO nausea, vomiting, or hematemesis: No diarrhea or constipation. No melena or hematochezia.    : No dysuria, frequent or hematuria.  Neuro: No numbness or weakness  Skin: No itching or rashes	    MEDICATIONS  (STANDING):  acetylcysteine 10% Inhalation 3 milliLiter(s) Inhalation every 6 hours  ALBUTerol/ipratropium for Nebulization 3 milliLiter(s) Nebulizer every 6 hours  aspirin enteric coated 81 milliGRAM(s) Oral daily  atorvastatin 80 milliGRAM(s) Oral at bedtime  clopidogrel Tablet 75 milliGRAM(s) Oral daily  docusate sodium 100 milliGRAM(s) Oral daily  epoetin gregoria Injectable 4000 Unit(s) IV Push <User Schedule>  lisinopril 10 milliGRAM(s) Oral daily  melatonin 3 milliGRAM(s) Oral at bedtime  metoprolol succinate  milliGRAM(s) Oral daily  senna 1 Tablet(s) Oral at bedtime  sevelamer hydrochloride 1600 milliGRAM(s) Oral three times a day with meals    MEDICATIONS  (PRN):  diazepam    Tablet 2.5 milliGRAM(s) Oral daily PRN anxiety        PHYSICAL EXAM:  Vital Signs Last 24 Hrs  T(C): 36.5 (22 Feb 2018 03:00), Max: 37 (21 Feb 2018 19:00)  T(F): 97.7 (22 Feb 2018 03:00), Max: 98.6 (21 Feb 2018 19:00)  HR: 83 (22 Feb 2018 06:40) (76 - 97)  BP: 93/54 (22 Feb 2018 06:40) (79/51 - 128/79)  BP(mean): 68 (22 Feb 2018 06:40) (57 - 94)  RR: 20 (22 Feb 2018 06:40) (14 - 25)  SpO2: 99% (22 Feb 2018 06:40) (92% - 100%)  I&O's Summary    21 Feb 2018 07:01  -  22 Feb 2018 07:00  --------------------------------------------------------  IN: 539.3 mL / OUT: 0 mL / NET: 539.3 mL        Appearance: Normal	  HEENT:   Normal oral mucosa, PERRL, EOMI	  Lymphatic: No lymphadenopathy  Cardiovascular: Normal S1 S2, No JVD, No murmurs, No edema  Respiratory: Lungs clear to auscultation	  Psychiatry: A & O x 3, Mood & affect appropriate  Gastrointestinal:  Soft, Non-tender, + BS	  Skin: No rashes, No ecchymoses, No cyanosis	  Neurologic: Non-focal  Extremities: Normal range of motion, No clubbing, cyanosis or edema  Vascular: Peripheral pulses palpable 2+ bilaterally    LABS:	 	                        6.8    8.1   )-----------( 259      ( 22 Feb 2018 06:07 )             19.7     Auto Eosinophil # 0.1   / Auto Eosinophil % 1.0   / Auto Neutrophil # 6.4   / Auto Neutrophil % 79.3  / BANDS % x                            6.6    7.9   )-----------( 253      ( 22 Feb 2018 05:32 )             19.0     Auto Eosinophil # 0.1   / Auto Eosinophil % 0.7   / Auto Neutrophil # 6.4   / Auto Neutrophil % 81.1  / BANDS % x                            7.2    7.8   )-----------( 230      ( 21 Feb 2018 04:19 )             21.5     Auto Eosinophil # 0.1   / Auto Eosinophil % 1.2   / Auto Neutrophil # 6.5   / Auto Neutrophil % 82.8  / BANDS % x        INR: 3.66 ratio (02-22 @ 05:32)  INR: 3.12 ratio (02-21 @ 23:28)  INR: 2.03 ratio (02-21 @ 04:19)    02-22    133<L>  |  93<L>  |  60<H>  ----------------------------<  98  4.1   |  26  |  6.70<H>  02-21    139  |  97  |  34<H>  ----------------------------<  107<H>  3.8   |  28  |  4.43<H>    Ca    8.4      22 Feb 2018 05:32  Mg     2.6     02-22  Phos  5.2     02-22  TPro  6.4  /  Alb  2.7<L>  /  TBili  0.4  /  DBili  x   /  AST  27  /  ALT  19  /  AlkPhos  55  02-22  TPro  6.8  /  Alb  2.9<L>  /  TBili  0.4  /  DBili  x   /  AST  35  /  ALT  20  /  AlkPhos  60  02-21        proBNP:   Lipid Profile: 02-04 Chol 145 LDL 87 HDL 26<L> Trig 162<H>  HgA1c: 5.4 % (02-04 @ 21:37)    TSH:     CARDIAC MARKERS:   12 Feb 2018 02:21 Troponin 0.62 ng/mL / Creatine Kinase 61 U/L /  CKMB 2.7 ng/mL / CPK Mass Assay % x       05 Feb 2018 05:05 Troponin 0.73 ng/mL / Creatine Kinase 156 U/L /  CKMB 14.0 ng/mL / CPK Mass Assay % 9.0 %   05 Feb 2018 01:17 Troponin 0.80 ng/mL / Creatine Kinase 158 U/L /  CKMB 15.2 ng/mL / CPK Mass Assay % 9.6 %        TELEMETRY: 	    ECG:  	  RADIOLOGY:  OTHER: 	    PREVIOUS DIAGNOSTIC TESTING:    [ ] Echocardiogram:  [ ]  Catheterization:  [ ] Stress Test:  	  	  Jose Church  Contact # Patient is a 77y old  Male who presents with a chief complaint of Shortness of Breath (04 Feb 2018 17:51)    HPI:  This is a 77 year old man with past medical history of AAA 5.5cm s/p EVAAR repair with stents on 1/29 with incidental finding of Right Renal Neoplasm, previous history of Left Renal Neoplasm with Left Nephrectomy in 2008, Bladder Cancer, Known LBBB, HTN, HLD transferred from [facility] after initially presenting with SOB which began last night. Patient states that he was discharged from the hospital following AAA repair and recovering well.  He was walking with walker at home with no complaints.  After going to bed, he was awoken from his sleep complaining of SOB with no relief.  He went to [facility] wtih his SOB getting progressively worse with 1 episode of vomitus before going to hospital.  Patient denies fever, chills, recent sick contact, Chest pain, Abdominal pain, diarrhea.  At [place], paient with elevate d-dimers and transferred for possible PE.  At [facility], patient with  RICKI in V2-V4 with elevated Trops, and ADHF requiring Bipap. (04 Feb 2018 17:51) Pt found to have renal artery stenosis secondary to partial occlusion of renal artery from recent EVAR. Pt also in DONNIE. During work-up a cardiac cath revealed triple vessel disease and pt needed to be started on HD for worsening renal failure. It was deemed to high risk for a renal artery bypass so an attempt was made to stent the renal artery, unsuccessful. 2/16 perma cath RCW placed for HD. 2/18 pt underwent high risk PCI w/ RICHARDSON x2 to LAD, RICHARDSON x1 to OM1, and POBA to D1.     Overnight Event: H/H drop overnight, no s/s bleeding.       REVIEW OF SYSTEMS:  CONSTITUTIONAL: No weakness, fevers or chills  Eyes/ENT: No visual changes: No vertigo or throat pain  NECK: No pain or stiffness  Resp: No cough, wheezing, hemoptysis; No shortness of breath  Cardiovascular: No chest pain or palpitations  GI: No abdominal or epigastric pain. NO nausea, vomiting, or hematemesis: No diarrhea or constipation. No melena or hematochezia.    : No dysuria, frequent or hematuria.  Neuro: No numbness or weakness  Skin: No itching or rashes	    MEDICATIONS  (STANDING):  acetylcysteine 10% Inhalation 3 milliLiter(s) Inhalation every 6 hours  ALBUTerol/ipratropium for Nebulization 3 milliLiter(s) Nebulizer every 6 hours  aspirin enteric coated 81 milliGRAM(s) Oral daily  atorvastatin 80 milliGRAM(s) Oral at bedtime  clopidogrel Tablet 75 milliGRAM(s) Oral daily  docusate sodium 100 milliGRAM(s) Oral daily  epoetin gregoria Injectable 4000 Unit(s) IV Push <User Schedule>  lisinopril 10 milliGRAM(s) Oral daily  melatonin 3 milliGRAM(s) Oral at bedtime  metoprolol succinate  milliGRAM(s) Oral daily  senna 1 Tablet(s) Oral at bedtime  sevelamer hydrochloride 1600 milliGRAM(s) Oral three times a day with meals    MEDICATIONS  (PRN):  diazepam    Tablet 2.5 milliGRAM(s) Oral daily PRN anxiety        PHYSICAL EXAM:  Vital Signs Last 24 Hrs  T(C): 36.5 (22 Feb 2018 03:00), Max: 37 (21 Feb 2018 19:00)  T(F): 97.7 (22 Feb 2018 03:00), Max: 98.6 (21 Feb 2018 19:00)  HR: 83 (22 Feb 2018 06:40) (76 - 97)  BP: 93/54 (22 Feb 2018 06:40) (79/51 - 128/79)  BP(mean): 68 (22 Feb 2018 06:40) (57 - 94)  RR: 20 (22 Feb 2018 06:40) (14 - 25)  SpO2: 99% (22 Feb 2018 06:40) (92% - 100%)  I&O's Summary    21 Feb 2018 07:01  -  22 Feb 2018 07:00  --------------------------------------------------------  IN: 539.3 mL / OUT: 0 mL / NET: 539.3 mL    Appearance: Normal	  HEENT:   Normal oral mucosa, PERRL, EOMI	  Lymphatic: No lymphadenopathy  Cardiovascular: Systolic murmur III/VI, No JVD   Respiratory: Lungs clear to auscultation	  Psychiatry: A & O x 2, Mood & affect appropriate  Gastrointestinal:  Soft, Non-tender, + BS	  Skin: ecchymosis LUE dark red/purple. No rashes, No cyanosis	  Neurologic: Non-focal  Extremities: +1 edema B/L ankles. No clubbing, cyanosis  Vascular: LUE thrill. Peripheral pulses palpable 2+ bilaterally, LI D/I. R [facility] D/I    LABS:	 	             6.8    8.1   )-----------( 259      ( 22 Feb 2018 06:07 )             19.7     Auto Eosinophil # 0.1   / Auto Eosinophil % 1.0   / Auto Neutrophil # 6.4   / Auto Neutrophil % 79.3  / BANDS % x                            6.6    7.9   )-----------( 253      ( 22 Feb 2018 05:32 )             19.0     Auto Eosinophil # 0.1   / Auto Eosinophil % 0.7   / Auto Neutrophil # 6.4   / Auto Neutrophil % 81.1  / BANDS % x                            7.2    7.8   )-----------( 230      ( 21 Feb 2018 04:19 )             21.5     Auto Eosinophil # 0.1   / Auto Eosinophil % 1.2   / Auto Neutrophil # 6.5   / Auto Neutrophil % 82.8  / BANDS % x        INR: 3.66 ratio (02-22 @ 05:32)  INR: 3.12 ratio (02-21 @ 23:28)  INR: 2.03 ratio (02-21 @ 04:19)    02-22    133<L>  |  93<L>  |  60<H>  ----------------------------<  98  4.1   |  26  |  6.70<H>  02-21    139  |  97  |  34<H>  ----------------------------<  107<H>  3.8   |  28  |  4.43<H>    Ca    8.4      22 Feb 2018 05:32  Mg     2.6     02-22  Phos  5.2     02-22  TPro  6.4  /  Alb  2.7<L>  /  TBili  0.4  /  DBili  x   /  AST  27  /  ALT  19  /  AlkPhos  55  02-22  TPro  6.8  /  Alb  2.9<L>  /  TBili  0.4  /  DBili  x   /  AST  35  /  ALT  20  /  AlkPhos  60  02-21    proBNP:   Lipid Profile: 02-04 Chol 145 LDL 87 HDL 26<L> Trig 162<H>  HgA1c: 5.4 % (02-04 @ 21:37)    CARDIAC MARKERS:   12 Feb 2018 02:21 Troponin 0.62 ng/mL / Creatine Kinase 61 U/L /  CKMB 2.7 ng/mL / CPK Mass Assay % x       05 Feb 2018 05:05 Troponin 0.73 ng/mL / Creatine Kinase 156 U/L /  CKMB 14.0 ng/mL / CPK Mass Assay % 9.0 %   05 Feb 2018 01:17 Troponin 0.80 ng/mL / Creatine Kinase 158 U/L /  CKMB 15.2 ng/mL / CPK Mass Assay % 9.6 %    TELEMETRY: NSR/AF overnight. NSR currently 	    ECG: NSR, BBB  	  PREVIOUS DIAGNOSTIC TESTING:    [ ] Echocardiogram: Reviewed  [ ]  Catheterization: Reviewed    Jose Garner ANP-c  Contact # Patient is a 77y old  Male who presents with a chief complaint of Shortness of Breath (04 Feb 2018 17:51)    HPI:  This is a 77 year old man with past medical history of AAA 5.5cm s/p EVAAR repair with stents on 1/29 with incidental finding of Right Renal Neoplasm, previous history of Left Renal Neoplasm with Left Nephrectomy in 2008, Bladder Cancer, Known LBBB, HTN, HLD transferred from [facility] after initially presenting with SOB which began last night. Patient states that he was discharged from the hospital following AAA repair and recovering well.  He was walking with walker at home with no complaints.  After going to bed, he was awoken from his sleep complaining of SOB with no relief.  He went to [facility] wtih his SOB getting progressively worse with 1 episode of vomitus before going to hospital.  Patient denies fever, chills, recent sick contact, Chest pain, Abdominal pain, diarrhea.  At [place], paient with elevate d-dimers and transferred for possible PE.  At [facility], patient with  RICKI in V2-V4 with elevated Trops, and ADHF requiring Bipap. (04 Feb 2018 17:51) Pt found to have renal artery stenosis secondary to partial occlusion of renal artery from recent EVAR. Pt also in DONNIE. During work-up a cardiac cath revealed triple vessel disease and pt needed to be started on HD for worsening renal failure. It was deemed to high risk for a renal artery bypass so an attempt was made to stent the renal artery, unsuccessful. 2/16 perma cath RCW placed for HD. 2/18 pt underwent high risk PCI w/ RICHARDSON x2 to LAD, RICHARDSON x1 to OM1, and POBA to D1.     Overnight Event: Awake resting comfortably in sitting position in bed H/H drop overnight, no s/s bleeding.       REVIEW OF SYSTEMS:  CONSTITUTIONAL: No weakness, fevers or chills  Eyes/ENT: No visual changes: No vertigo or throat pain  NECK: No pain or stiffness  Resp: No cough, wheezing, hemoptysis; No shortness of breath  Cardiovascular: No chest pain or palpitations  GI: No abdominal or epigastric pain. NO nausea, vomiting, or hematemesis: No diarrhea or constipation. No melena or hematochezia.    : No dysuria, frequent or hematuria.  Neuro: No numbness or weakness  Skin: No itching or rashes	    MEDICATIONS  (STANDING):  acetylcysteine 10% Inhalation 3 milliLiter(s) Inhalation every 6 hours  ALBUTerol/ipratropium for Nebulization 3 milliLiter(s) Nebulizer every 6 hours  aspirin enteric coated 81 milliGRAM(s) Oral daily  atorvastatin 80 milliGRAM(s) Oral at bedtime  clopidogrel Tablet 75 milliGRAM(s) Oral daily  docusate sodium 100 milliGRAM(s) Oral daily  epoetin gregoria Injectable 4000 Unit(s) IV Push <User Schedule>  lisinopril 10 milliGRAM(s) Oral daily  melatonin 3 milliGRAM(s) Oral at bedtime  metoprolol succinate  milliGRAM(s) Oral daily  senna 1 Tablet(s) Oral at bedtime  sevelamer hydrochloride 1600 milliGRAM(s) Oral three times a day with meals    MEDICATIONS  (PRN):  diazepam    Tablet 2.5 milliGRAM(s) Oral daily PRN anxiety        PHYSICAL EXAM:  Vital Signs Last 24 Hrs  T(C): 36.5 (22 Feb 2018 03:00), Max: 37 (21 Feb 2018 19:00)  T(F): 97.7 (22 Feb 2018 03:00), Max: 98.6 (21 Feb 2018 19:00)  HR: 83 (22 Feb 2018 06:40) (76 - 97)  BP: 93/54 (22 Feb 2018 06:40) (79/51 - 128/79)  BP(mean): 68 (22 Feb 2018 06:40) (57 - 94)  RR: 20 (22 Feb 2018 06:40) (14 - 25)  SpO2: 99% (22 Feb 2018 06:40) (92% - 100%)  I&O's Summary    21 Feb 2018 07:01  -  22 Feb 2018 07:00  --------------------------------------------------------  IN: 539.3 mL / OUT: 0 mL / NET: 539.3 mL    Appearance: Normal	  HEENT:   Normal oral mucosa, PERRL, EOMI	  Lymphatic: No lymphadenopathy  Cardiovascular: Systolic murmur III/VI, No JVD   Respiratory: Lungs clear to auscultation	  Psychiatry: A & O x 2, Mood & affect appropriate  Gastrointestinal:  Soft, Non-tender, + BS	  Skin: ecchymosis LUE dark red/purple. No rashes, No cyanosis	  Neurologic: Non-focal  Extremities: +1 edema B/L ankles. No clubbing, cyanosis  Vascular: LUE thrill. Peripheral pulses palpable 2+ bilaterally, LI D/I. R [facility] D/I    LABS:	 	             6.8    8.1   )-----------( 259      ( 22 Feb 2018 06:07 )             19.7     Auto Eosinophil # 0.1   / Auto Eosinophil % 1.0   / Auto Neutrophil # 6.4   / Auto Neutrophil % 79.3  / BANDS % x                            6.6    7.9   )-----------( 253      ( 22 Feb 2018 05:32 )             19.0     Auto Eosinophil # 0.1   / Auto Eosinophil % 0.7   / Auto Neutrophil # 6.4   / Auto Neutrophil % 81.1  / BANDS % x                            7.2    7.8   )-----------( 230      ( 21 Feb 2018 04:19 )             21.5     Auto Eosinophil # 0.1   / Auto Eosinophil % 1.2   / Auto Neutrophil # 6.5   / Auto Neutrophil % 82.8  / BANDS % x        INR: 3.66 ratio (02-22 @ 05:32)  INR: 3.12 ratio (02-21 @ 23:28)  INR: 2.03 ratio (02-21 @ 04:19)    02-22    133<L>  |  93<L>  |  60<H>  ----------------------------<  98  4.1   |  26  |  6.70<H>  02-21    139  |  97  |  34<H>  ----------------------------<  107<H>  3.8   |  28  |  4.43<H>    Ca    8.4      22 Feb 2018 05:32  Mg     2.6     02-22  Phos  5.2     02-22  TPro  6.4  /  Alb  2.7<L>  /  TBili  0.4  /  DBili  x   /  AST  27  /  ALT  19  /  AlkPhos  55  02-22  TPro  6.8  /  Alb  2.9<L>  /  TBili  0.4  /  DBili  x   /  AST  35  /  ALT  20  /  AlkPhos  60  02-21    proBNP:   Lipid Profile: 02-04 Chol 145 LDL 87 HDL 26<L> Trig 162<H>  HgA1c: 5.4 % (02-04 @ 21:37)    CARDIAC MARKERS:   12 Feb 2018 02:21 Troponin 0.62 ng/mL / Creatine Kinase 61 U/L /  CKMB 2.7 ng/mL / CPK Mass Assay % x       05 Feb 2018 05:05 Troponin 0.73 ng/mL / Creatine Kinase 156 U/L /  CKMB 14.0 ng/mL / CPK Mass Assay % 9.0 %   05 Feb 2018 01:17 Troponin 0.80 ng/mL / Creatine Kinase 158 U/L /  CKMB 15.2 ng/mL / CPK Mass Assay % 9.6 %    TELEMETRY: NSR/AF overnight. NSR currently 	    ECG: NSR, BBB  	  PREVIOUS DIAGNOSTIC TESTING:    [ ] Echocardiogram: Reviewed  [ ]  Catheterization: Reviewed    Jose Garner ANP-c  Contact #

## 2018-02-22 NOTE — PROGRESS NOTE ADULT - PROBLEM SELECTOR PLAN 1
Patient with DONNIE on CKD in setting of solitary kidney with significant renal artery occlusion, now ESRD: Baseline creatinine prior to admission was between 1.2-1.3. Scr on presentation was 6.48. Complements noted to be WNL.  Patient was initiated on CVVHDF on 2/5/18 and transitioned to IHD. Patient now on intermittent HD. s/p tunneled HD catheter placement. Last HD was done on 2/20/18 with 2 L fluid removal. Plan for maintenance HD today.  Monitor BMP daily.  Recommend vascular surgery evaluation for AVF creation.

## 2018-02-22 NOTE — DISCHARGE NOTE ADULT - CARE PLAN
Principal Discharge DX:	Acute decompensated heart failure  Goal:	You will remain free of shortness of breath.  Assessment and plan of treatment:	Take your medications as prescribed. Follow a  low-salt, low cholesterol heart healthy diet. Weigh yourself every day; call your doctor if you gain 2 pounds over one to two days or 3 pounds over three days. Get to or maintain a healthy weight; ask your heart failure team for referrals to a registered dietitian if needed. Be active (check with your physician or cardiologist first). Find healthy ways to deal with stress, such as deep breathing, meditation, exercise, and doing hobbies that you enjoy. If you smoke, quit. (A resource to help you stop smoking is the [facility] for Tobacco Control – phone number [phone].).  Secondary Diagnosis:	Acute renal failure superimposed on stage 3 chronic kidney disease, unspecified acute renal failure type  Assessment and plan of treatment:	You will continue with dialysis and being monitored by a kidney speciaist.   Avoid taking (NSAIDs) - (ex: Ibuprofen, Advil, Celebrex, Naprosyn)  Avoid taking any nephrotoxic agents (can harm kidneys) - Intravenous contrast for diagnostic testing, combination cold medications.  Have all medications adjusted for your renal function by your Health Care Provider.  Blood pressure control is important.  Take all medication as prescribed. Principal Discharge DX:	Acute decompensated heart failure  Goal:	You will remain free of shortness of breath.  Assessment and plan of treatment:	Take your medications as prescribed. Follow a  low-salt, low cholesterol heart healthy diet. Weigh yourself every day; call your doctor if you gain 2 pounds over one to two days or 3 pounds over three days. Get to or maintain a healthy weight; ask your heart failure team for referrals to a registered dietitian if needed. Be active (check with your physician or cardiologist first). Find healthy ways to deal with stress, such as deep breathing, meditation, exercise, and doing hobbies that you enjoy. If you smoke, quit. (A resource to help you stop smoking is the [facility] for Tobacco Control – phone number [phone].).  Secondary Diagnosis:	Acute renal failure superimposed on stage 3 chronic kidney disease, unspecified acute renal failure type  Assessment and plan of treatment:	You will continue with dialysis and being monitored by a kidney specialist.   Avoid taking (NSAIDs) - (ex: Ibuprofen, Advil, Celebrex, Naprosyn)  Avoid taking any nephrotoxic agents (can harm kidneys) - Intravenous contrast for diagnostic testing, combination cold medications.  Have all medications adjusted for your renal function by your Health Care Provider.  Blood pressure control is important.  Take all medication as prescribed.

## 2018-02-22 NOTE — CHART NOTE - NSCHARTNOTEFT_GEN_A_CORE
====================  CCU MIDNIGHT ROUNDS  ====================    DEUCE BALL  [id number]    ====================  SUMMARY: HPI:  This is a 77 year old man with past medical history of AAA 5.5cm s/p EVAAR repair with stents on 1/29 with incidental finding of Right Renal Neoplasm, previous history of Left Renal Neoplasm with Left Nephrectomy in 2008, Bladder Cancer, Known LBBB, HTN, HLD transferred from [facility] after initially presenting with SOB which began last night. Patient states that he was discharged from the hospital following AAA repair and recovering well.  He was walking with walker at home with no complaints.  After going to bed, he was awoken from his sleep complaining of SOB with no relief.  He went to [facility] with his SOB getting progressively worse with 1 episode of vomitus before going to hospital.  Patient denies fever, chills, recent sick contact, Chest pain, Abdominal pain, diarrhea.  At [place], paient with elevate d-dimers and transferred for possible PE.  At [facility], patient with  RICKI in V2-V4 with elevated Trops, and ADHF requiring Bipap. (04 Feb 2018 17:51)    ====================        ====================  NEW EVENTS: Pt dialyzed today and received 1UPRBC during dialysis. Hgb responded on repeat CBC. Coumadin being held tonight for recent drop in H/H.  ====================        ====================  VITALS (Last 12 hrs):  ====================    T(C): 36.6 (02-22-18 @ 20:00), Max: 36.8 (02-22-18 @ 12:55)  HR: 96 (02-22-18 @ 21:00) (85 - 102)  BP: 103/64 (02-22-18 @ 21:00) (91/51 - 121/69)  BP(mean): 75 (02-22-18 @ 21:00) (58 - 85)  RR: 16 (02-22-18 @ 21:00) (13 - 23)  SpO2: 96% (02-22-18 @ 21:00) (93% - 98%)      TELEMETRY: sinus          I&O's Summary    21 Feb 2018 07:01  -  22 Feb 2018 07:00  --------------------------------------------------------  IN: 539.3 mL / OUT: 0 mL / NET: 539.3 mL    22 Feb 2018 07:01  -  22 Feb 2018 22:42  --------------------------------------------------------  IN: 1810 mL / OUT: 1600 mL / NET: 210 mL        ====================  PLAN:  #DONNIE: s/p permacath placement. S/p HD today w/ 1UPRBC for drop in H/H. Pt is dialysis dependant. Monitoring lytes. HD will continue at [facility]. Renal recommends AVF creation by vascular surgery due to insuccessful attempts to revasc kidney. Family will expore holistic medicine options in the mean time.   #CAD: s/p stenting this admission. C/w DAPT, statin, BB. ACE is being held in the setting of CKD and soft BPs. Will see if BP can tolerate in AM.  #LUE pseudoaneurysm: vascular saw pt, NTD at present time. Will monitor.   #dispo: d/c planning to rehab.   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

## 2018-02-22 NOTE — DISCHARGE NOTE ADULT - MEDICATION SUMMARY - MEDICATIONS TO STOP TAKING
I will STOP taking the medications listed below when I get home from the hospital:    losartan 100 mg oral tablet  -- 1 tab(s) by mouth once a day    metoprolol tartrate 100 mg oral tablet  -- 1 tab(s) by mouth 2 times a day    niacin 250 mg oral tablet  -- 1 tab(s) by mouth once a day    rosuvastatin 20 mg oral tablet  -- 1 tab(s) by mouth once a day (at bedtime)    oxyCODONE-acetaminophen 5 mg-325 mg oral tablet  -- 1 tab(s) by mouth every 4 hours, As needed, Moderate Pain MDD:5 tabs I will STOP taking the medications listed below when I get home from the hospital:    diazePAM 10 mg oral tablet  -- orally 2 times a day, As Needed    losartan 100 mg oral tablet  -- 1 tab(s) by mouth once a day    metoprolol tartrate 100 mg oral tablet  -- 1 tab(s) by mouth 2 times a day    niacin 250 mg oral tablet  -- 1 tab(s) by mouth once a day    rosuvastatin 20 mg oral tablet  -- 1 tab(s) by mouth once a day (at bedtime)    oxyCODONE-acetaminophen 5 mg-325 mg oral tablet  -- 1 tab(s) by mouth every 4 hours, As needed, Moderate Pain MDD:5 tabs

## 2018-02-22 NOTE — DISCHARGE NOTE ADULT - OTHER SIGNIFICANT FINDINGS
Cardiac Cath Lab 2/18/28:  CORONARY VESSELS:  LAD:   --  Proximal LAD: There was a 95 % stenosis.  --  D2: There was a 90 % stenosis.  CX:   --  OM1: There was a 90 % stenosis.  COMPLICATIONS: There were no complications.  INTERVENTIONAL RECOMMENDATIONS: ASA and Plavix for 3 months.      TTE 2/4/18 Conclusions:  1. Normal left atrium.  2. Left ventricular ejection fraction, by visual  estimation, is 20-25%.  Multiple segmental wall motion  abnormalities suggestive of CAD.  3. Normal right atrium.  4. Normal right ventricular size and function.  5. Normal pericardium with no pericardial effusion.  Findings discussed with cardiology fellow 10:45pm.  Definity will improve delineation of left ventricular  endocardial border to assess further wall motion  abnormality.

## 2018-02-22 NOTE — PROVIDER CONTACT NOTE (CRITICAL VALUE NOTIFICATION) - ACTION/TREATMENT ORDERED:
no vanco ordered
Pt scheduled for dialysis today. Recommended to renal for  blood be transfused with dialysis.
As above.
Will away results from repeat CBC.

## 2018-02-22 NOTE — DISCHARGE NOTE ADULT - ADDITIONAL INSTRUCTIONS
Weigh yourself daily.  If you gain 3lbs in 3 days, or 5lbs in a week call your Health Care Provider.  Call your Health Care Provider if you have any swelling or increased swelling in your feet, ankles, and/or stomach.  Take all of your medication as directed.  If you become dizzy call your Health Care Provider.

## 2018-02-22 NOTE — DISCHARGE NOTE ADULT - CONDITIONS AT DISCHARGE
Patient tolerated HD without issues. Patient ambulated to commode with walker, denied lightheadedness or shortness of breath.

## 2018-02-22 NOTE — DISCHARGE NOTE ADULT - CARE PROVIDERS DIRECT ADDRESSES
[email or web address]

## 2018-02-22 NOTE — DISCHARGE NOTE ADULT - MEDICATION SUMMARY - MEDICATIONS TO TAKE
I will START or STAY ON the medications listed below when I get home from the hospital:    aspirin 81 mg oral tablet  -- 1 tab(s) by mouth 2 times a day  -- Indication: For CAD (coronary artery disease)    lisinopril 5 mg oral tablet  -- 1 tab(s) by mouth once a day  -- Indication: For CAD (coronary artery disease)    Coumadin 2 mg oral tablet  -- 1 tab(s) by mouth once a day   2/24: INR 2.7  -- Do not take this drug if you are pregnant.  It is very important that you take or use this exactly as directed.  Do not skip doses or discontinue unless directed by your doctor.  Obtain medical advice before taking any non-prescription drugs as some may affect the action of this medication.    -- Indication: For PAFIB     diazePAM 10 mg oral tablet  -- orally 2 times a day, As Needed  -- Indication: For PAIN    atorvastatin 80 mg oral tablet  -- 1 tab(s) by mouth once a day (at bedtime)  -- Indication: For CAD (coronary artery disease)    clopidogrel 75 mg oral tablet  -- 1 tab(s) by mouth once a day  -- Indication: For CAD (coronary artery disease)    Toprol- mg oral tablet, extended release  -- 1 tab(s) by mouth once a day  -- Indication: For CAD (coronary artery disease)    epoetin gregoria  -- 4000 unit(s) intravenously 3 times a week with dialysis  -- Indication: For ESRD    docusate sodium 100 mg oral capsule  -- 1 cap(s) by mouth once a day  -- Indication: For CoNSTIPATION     senna oral tablet  -- 1 tab(s) by mouth once a day (at bedtime)  -- Indication: For CoNSTIPATION     melatonin 3 mg oral tablet  -- 1 tab(s) by mouth once a day (at bedtime)  -- Indication: For INSOMNIA     latanoprost 0.005% ophthalmic solution  -- 1 drop(s) to each affected eye once a day (in the evening)  -- Indication: For GLAUCOMA     sevelamer hydrochloride 800 mg oral tablet  -- 2 tab(s) by mouth 3 times a day (with meals)  -- Indication: For BPH I will START or STAY ON the medications listed below when I get home from the hospital:    aspirin 81 mg oral tablet  -- 1 tab(s) by mouth 2 times a day  -- Indication: For CAD (coronary artery disease)    lisinopril 5 mg oral tablet  -- 1 tab(s) by mouth once a day  -- Indication: For CAD (coronary artery disease)    Coumadin 2 mg oral tablet  -- 1 tab(s) by mouth once a day   2/24: INR 2.7  -- Do not take this drug if you are pregnant.  It is very important that you take or use this exactly as directed.  Do not skip doses or discontinue unless directed by your doctor.  Obtain medical advice before taking any non-prescription drugs as some may affect the action of this medication.    -- Indication: For PAFIB     atorvastatin 80 mg oral tablet  -- 1 tab(s) by mouth once a day (at bedtime)  -- Indication: For CAD (coronary artery disease)    clopidogrel 75 mg oral tablet  -- 1 tab(s) by mouth once a day  -- Indication: For CAD (coronary artery disease)    Toprol- mg oral tablet, extended release  -- 1 tab(s) by mouth once a day  -- Indication: For CAD (coronary artery disease)    epoetin gregoria  -- 4000 unit(s) intravenously 3 times a week with dialysis  -- Indication: For ESRD    docusate sodium 100 mg oral capsule  -- 1 cap(s) by mouth once a day  -- Indication: For CoNSTIPATION     senna oral tablet  -- 1 tab(s) by mouth once a day (at bedtime)  -- Indication: For CoNSTIPATION     melatonin 3 mg oral tablet  -- 1 tab(s) by mouth once a day (at bedtime)  -- Indication: For INSOMNIA     latanoprost 0.005% ophthalmic solution  -- 1 drop(s) to each affected eye once a day (in the evening)  -- Indication: For GLAUCOMA     sevelamer hydrochloride 800 mg oral tablet  -- 2 tab(s) by mouth 3 times a day (with meals)  -- Indication: For BPH

## 2018-02-22 NOTE — PROGRESS NOTE ADULT - ASSESSMENT
Assessment and Plan:   · Assessment		  77 year old man with past medical history of AAA 5.5cm s/p EVAAR repair with stents on 1/29 with incidental finding of Right Renal Neoplasm. Pt transferred from [facility] after initially presenting with SOB which began last night. Patient states that he was discharged from the hospital following AAA repair and recovering well.  After going to bed, he was awoken from his sleep complaining of SOB with no relief.  He went to [facility] with his SOB getting progressively worse with 1 episode of vomitus before going to hospital.  At [place], patient with elevate d-dimers and transferred for possible PE.  At [facility], patient with  RICKI in V2-V4 with elevated Trops, and ADHF requiring Bipap. (04 Feb 2018 17:51). Pt found to have renal artery stenosis secondary to partial occlusion of renal artery from recent EVAR. Pt also in DONNIE. During work-up a cardiac cath revealed triple vessel disease and pt needed to be started on HD for worsening renal failure. It was deemed to high risk for a renal artery bypass so an attempt was made to stent the renal artery, unsuccessfully. 2/16 PermCath RCW placed for HD. 2/18 pt underwent high risk PCI w/ RICHARDSON x2 to LAD, RICHARDSON x1 to OM1, and POBA to D1.      Problem/Plan - 1:  ·  Problem: DONNIE (acute kidney injury).  Plan: Patient with DONNIE on CKD in setting of significant renal artery occlusion: Baseline creatinine is between 1.2-1.3. Scr. on presentation was 6.48. Complements noted to be WNL.  Patient was initiated on CVVHDF on 2/5/18 and transitioned to IHD. Patient is currently dialysis dependent. s/p tunneled HD catheter placement.   -Monitor BMP daily.   -planned for maintenance HD today  -Nephro recommends vascular surgery for AVF creation- family voices they do not want any more surgeries for their father, and will f/u out patient.      Problem/Plan - 2:  ·  Problem: Hyperkalemia.  Plan: Serum potassium is 4.1 today. Monitor serum K level      Problem/Plan - 3:  ·  Problem: Other hypervolemia.  Plan: Monitor signs of fluid overload.      Problem/Plan - 4:  ·  Problem: Hyperphosphatemia.  Plan: In setting of renal failure:  C/w Renagel 1600 mg TID with meals. Monitor serum phosphorus level.      Problem/Plan - 5:  ·  Problem: HIT (heparin-induced thrombocytopenia).  Plan: Argatroban d/c'd. INR 3.66, hold coumadin tonight, restart 2mg on 2/23   Platelets have normalized.      Problem/Plan - 6:  Problem: NSTEMI (non-ST elevated myocardial infarction). Plan: -s/p cath with RICHARDSON x3 and POBA  -reevaluate need for triple therapy; ASA, Plavix, coumadin  -Increase Metoprolol to 100mg ER daily  -D/c Hydralazine, Isosorbide change to lisinopril   -Continue Statin     Problem/Plan - 7:  ·  Problem: Abdominal aortic aneurysm (AAA) without rupture.  Plan: Stable  Vascular surgery D/C staples.  Problem/Plan - 8:  ·  Problem: LUE fistula/pseudo aneurysm Plan: -U/S completed; nothing to do at this time, monitor for circulatory compromise.   - No BPs/blood draws left side.      Problem/Plan - 9:  ·  Problem: anemia.  Plan: In setting of renal failure: nephrology recommends start on epogen 4000 U TIW during dialysis.   - awaiting serum ferritin, TIBC chadwick, total iron 34.   - H/H drop from 7.2/21.5 >>6.8/19.7. 1U PRBCs ordered for today.  -Monitor CBC

## 2018-02-22 NOTE — CHART NOTE - NSCHARTNOTEFT_GEN_A_CORE
Patient's H/H drop from 7.2/21.5 to 6.8/19.7.  No signs of bleeding noted.  Hypotensive but no change from before.  Lisinopril and Toprol was increased yesterday but Hydralazine and Isordil discontinued.  Epogen started yesterday as well.  Will prefer to transfuse during HD.  Will do FOBT.    Lety Dyer NP  Cardiology

## 2018-02-22 NOTE — DISCHARGE NOTE ADULT - CARE PROVIDER_API CALL
Ofleia Mccoy), Cardiovascular Disease; Interventional Cardiology  [address] [place] [address]  Phone: [phone]  Fax: [phone]

## 2018-02-22 NOTE — DISCHARGE NOTE ADULT - ABILITY TO HEAR (WITH HEARING AID OR HEARING APPLIANCE IF NORMALLY USED):
Adequate: hears normal conversation without difficulty
Detail Level: Generalized
Detail Level: Simple
Detail Level: Zone

## 2018-02-22 NOTE — DISCHARGE NOTE ADULT - PATIENT PORTAL LINK FT
You can access the [facility] Patient Portal, offered by [facility], by registering with the following website: [email or web address]

## 2018-02-22 NOTE — PROGRESS NOTE ADULT - PROBLEM SELECTOR PLAN 3
In setting of renal failure: Serum phosphorus is in acceptable range. C/w renagel 1600 mg TID with meals. Monitor serum phosphorus level.  Check iPTH.

## 2018-02-22 NOTE — PROGRESS NOTE ADULT - PROBLEM SELECTOR PLAN 4
In setting of renal failure: Started on epogen 4000 U TIW during dialysis. Please check serum ferritin and iron studies to r/o iron deficiency. Monitor Hb.

## 2018-02-23 DIAGNOSIS — I25.10 ATHEROSCLEROTIC HEART DISEASE OF NATIVE CORONARY ARTERY WITHOUT ANGINA PECTORIS: ICD-10-CM

## 2018-02-23 DIAGNOSIS — E87.70 FLUID OVERLOAD, UNSPECIFIED: ICD-10-CM

## 2018-02-23 DIAGNOSIS — D64.9 ANEMIA, UNSPECIFIED: ICD-10-CM

## 2018-02-23 LAB
ALBUMIN SERPL ELPH-MCNC: 2.7 G/DL — LOW (ref 3.3–5)
ALP SERPL-CCNC: 58 U/L — SIGNIFICANT CHANGE UP (ref 40–120)
ALT FLD-CCNC: 19 U/L RC — SIGNIFICANT CHANGE UP (ref 10–45)
ANION GAP SERPL CALC-SCNC: 14 MMOL/L — SIGNIFICANT CHANGE UP (ref 5–17)
APTT BLD: 36 SEC — SIGNIFICANT CHANGE UP (ref 27.5–37.4)
AST SERPL-CCNC: 27 U/L — SIGNIFICANT CHANGE UP (ref 10–40)
BASOPHILS # BLD AUTO: 0.1 K/UL — SIGNIFICANT CHANGE UP (ref 0–0.2)
BASOPHILS NFR BLD AUTO: 0.9 % — SIGNIFICANT CHANGE UP (ref 0–2)
BILIRUB SERPL-MCNC: 0.5 MG/DL — SIGNIFICANT CHANGE UP (ref 0.2–1.2)
BUN SERPL-MCNC: 33 MG/DL — HIGH (ref 7–23)
CALCIUM SERPL-MCNC: 8.4 MG/DL — SIGNIFICANT CHANGE UP (ref 8.4–10.5)
CHLORIDE SERPL-SCNC: 95 MMOL/L — LOW (ref 96–108)
CO2 SERPL-SCNC: 26 MMOL/L — SIGNIFICANT CHANGE UP (ref 22–31)
CREAT SERPL-MCNC: 4.47 MG/DL — HIGH (ref 0.5–1.3)
EOSINOPHIL # BLD AUTO: 0.1 K/UL — SIGNIFICANT CHANGE UP (ref 0–0.5)
EOSINOPHIL NFR BLD AUTO: 1.4 % — SIGNIFICANT CHANGE UP (ref 0–6)
GLUCOSE SERPL-MCNC: 100 MG/DL — HIGH (ref 70–99)
HCT VFR BLD CALC: 23.9 % — LOW (ref 39–50)
HGB BLD-MCNC: 8 G/DL — LOW (ref 13–17)
INR BLD: 2.77 RATIO — HIGH (ref 0.88–1.16)
LYMPHOCYTES # BLD AUTO: 0.7 K/UL — LOW (ref 1–3.3)
LYMPHOCYTES # BLD AUTO: 9.4 % — LOW (ref 13–44)
MAGNESIUM SERPL-MCNC: 2.2 MG/DL — SIGNIFICANT CHANGE UP (ref 1.6–2.6)
MCHC RBC-ENTMCNC: 30.7 PG — SIGNIFICANT CHANGE UP (ref 27–34)
MCHC RBC-ENTMCNC: 33.6 GM/DL — SIGNIFICANT CHANGE UP (ref 32–36)
MCV RBC AUTO: 91.2 FL — SIGNIFICANT CHANGE UP (ref 80–100)
MONOCYTES # BLD AUTO: 0.7 K/UL — SIGNIFICANT CHANGE UP (ref 0–0.9)
MONOCYTES NFR BLD AUTO: 9.4 % — SIGNIFICANT CHANGE UP (ref 2–14)
NEUTROPHILS # BLD AUTO: 6.2 K/UL — SIGNIFICANT CHANGE UP (ref 1.8–7.4)
NEUTROPHILS NFR BLD AUTO: 78.9 % — HIGH (ref 43–77)
OB PNL STL: NEGATIVE — SIGNIFICANT CHANGE UP
PHOSPHATE SERPL-MCNC: 2.8 MG/DL — SIGNIFICANT CHANGE UP (ref 2.5–4.5)
PLATELET # BLD AUTO: 255 K/UL — SIGNIFICANT CHANGE UP (ref 150–400)
POTASSIUM SERPL-MCNC: 4 MMOL/L — SIGNIFICANT CHANGE UP (ref 3.5–5.3)
POTASSIUM SERPL-SCNC: 4 MMOL/L — SIGNIFICANT CHANGE UP (ref 3.5–5.3)
PROT SERPL-MCNC: 6.5 G/DL — SIGNIFICANT CHANGE UP (ref 6–8.3)
PROTHROM AB SERPL-ACNC: 30.8 SEC — HIGH (ref 9.8–12.7)
RBC # BLD: 2.62 M/UL — LOW (ref 4.2–5.8)
RBC # FLD: 13.5 % — SIGNIFICANT CHANGE UP (ref 10.3–14.5)
SODIUM SERPL-SCNC: 135 MMOL/L — SIGNIFICANT CHANGE UP (ref 135–145)
WBC # BLD: 7.8 K/UL — SIGNIFICANT CHANGE UP (ref 3.8–10.5)
WBC # FLD AUTO: 7.8 K/UL — SIGNIFICANT CHANGE UP (ref 3.8–10.5)

## 2018-02-23 PROCEDURE — 99238 HOSP IP/OBS DSCHRG MGMT 30/<: CPT

## 2018-02-23 PROCEDURE — 99233 SBSQ HOSP IP/OBS HIGH 50: CPT

## 2018-02-23 PROCEDURE — 93010 ELECTROCARDIOGRAM REPORT: CPT

## 2018-02-23 RX ORDER — SEVELAMER CARBONATE 2400 MG/1
2 POWDER, FOR SUSPENSION ORAL
Qty: 0 | Refills: 0 | DISCHARGE
Start: 2018-02-23

## 2018-02-23 RX ORDER — METOPROLOL TARTRATE 50 MG
1 TABLET ORAL
Qty: 0 | Refills: 0 | DISCHARGE
Start: 2018-02-23

## 2018-02-23 RX ORDER — DOCUSATE SODIUM 100 MG
1 CAPSULE ORAL
Qty: 0 | Refills: 0 | DISCHARGE
Start: 2018-02-23

## 2018-02-23 RX ORDER — ROSUVASTATIN CALCIUM 5 MG/1
1 TABLET ORAL
Qty: 0 | Refills: 0 | COMMUNITY

## 2018-02-23 RX ORDER — ERYTHROPOIETIN 10000 [IU]/ML
4000 INJECTION, SOLUTION INTRAVENOUS; SUBCUTANEOUS
Qty: 0 | Refills: 0 | DISCHARGE
Start: 2018-02-23

## 2018-02-23 RX ORDER — NIACIN 50 MG
1 TABLET ORAL
Qty: 0 | Refills: 0 | COMMUNITY

## 2018-02-23 RX ORDER — CLOPIDOGREL BISULFATE 75 MG/1
1 TABLET, FILM COATED ORAL
Qty: 0 | Refills: 0 | DISCHARGE
Start: 2018-02-23

## 2018-02-23 RX ORDER — LISINOPRIL 2.5 MG/1
5 TABLET ORAL DAILY
Qty: 0 | Refills: 0 | Status: DISCONTINUED | OUTPATIENT
Start: 2018-02-23 | End: 2018-02-24

## 2018-02-23 RX ORDER — LISINOPRIL 2.5 MG/1
1 TABLET ORAL
Qty: 0 | Refills: 0 | DISCHARGE
Start: 2018-02-23

## 2018-02-23 RX ORDER — ATORVASTATIN CALCIUM 80 MG/1
1 TABLET, FILM COATED ORAL
Qty: 0 | Refills: 0 | DISCHARGE
Start: 2018-02-23

## 2018-02-23 RX ORDER — WARFARIN SODIUM 2.5 MG/1
1 TABLET ORAL
Qty: 30 | Refills: 0 | OUTPATIENT
Start: 2018-02-23 | End: 2018-03-24

## 2018-02-23 RX ORDER — WARFARIN SODIUM 2.5 MG/1
2 TABLET ORAL ONCE
Qty: 0 | Refills: 0 | Status: COMPLETED | OUTPATIENT
Start: 2018-02-23 | End: 2018-02-23

## 2018-02-23 RX ORDER — SENNA PLUS 8.6 MG/1
1 TABLET ORAL
Qty: 0 | Refills: 0 | DISCHARGE
Start: 2018-02-23

## 2018-02-23 RX ORDER — LOSARTAN POTASSIUM 100 MG/1
1 TABLET, FILM COATED ORAL
Qty: 0 | Refills: 0 | COMMUNITY

## 2018-02-23 RX ORDER — LANOLIN ALCOHOL/MO/W.PET/CERES
1 CREAM (GRAM) TOPICAL
Qty: 0 | Refills: 0 | DISCHARGE
Start: 2018-02-23

## 2018-02-23 RX ADMIN — Medication 3 MILLIGRAM(S): at 22:32

## 2018-02-23 RX ADMIN — SEVELAMER CARBONATE 1600 MILLIGRAM(S): 2400 POWDER, FOR SUSPENSION ORAL at 18:03

## 2018-02-23 RX ADMIN — SEVELAMER CARBONATE 1600 MILLIGRAM(S): 2400 POWDER, FOR SUSPENSION ORAL at 08:53

## 2018-02-23 RX ADMIN — Medication 3 MILLILITER(S): at 12:27

## 2018-02-23 RX ADMIN — Medication 3 MILLILITER(S): at 05:46

## 2018-02-23 RX ADMIN — Medication 81 MILLIGRAM(S): at 11:52

## 2018-02-23 RX ADMIN — Medication 3 MILLILITER(S): at 17:29

## 2018-02-23 RX ADMIN — SEVELAMER CARBONATE 1600 MILLIGRAM(S): 2400 POWDER, FOR SUSPENSION ORAL at 11:52

## 2018-02-23 RX ADMIN — CLOPIDOGREL BISULFATE 75 MILLIGRAM(S): 75 TABLET, FILM COATED ORAL at 11:52

## 2018-02-23 RX ADMIN — ATORVASTATIN CALCIUM 80 MILLIGRAM(S): 80 TABLET, FILM COATED ORAL at 22:32

## 2018-02-23 RX ADMIN — LISINOPRIL 5 MILLIGRAM(S): 2.5 TABLET ORAL at 06:09

## 2018-02-23 RX ADMIN — WARFARIN SODIUM 2 MILLIGRAM(S): 2.5 TABLET ORAL at 22:32

## 2018-02-23 RX ADMIN — Medication 100 MILLIGRAM(S): at 06:09

## 2018-02-23 NOTE — PROGRESS NOTE ADULT - ASSESSMENT
This is a 77 year old man with past medical history of AAA 5.5cm s/p EVAAR repair with stents on 1/29 with incidental finding of Right Renal Neoplasm, previous history of Left Renal Neoplasm with Nephrectomy '08, Bladder Cancer, Known LBBB, HTN, HLD transferred from [facility] after initially presenting with SOB  At [facility] with elevate d-dimers and transferred for possible PE.  At [facility], patient with  RICKI in V2-V4 with elevated Trops, and ADHF requiring Bipap. with complicated hospital course now s/p pna, R afib, HIT, HD, CC w/ DESx2 LAD, DESx1 OM1 ON HD 3x a week. Ready for transfer to subacute rehab

## 2018-02-23 NOTE — PROGRESS NOTE ADULT - PROBLEM SELECTOR PROBLEM 7
Abdominal aortic aneurysm (AAA) without rupture
Abdominal aortic aneurysm (AAA) without rupture
Acute renal failure superimposed on stage 3 chronic kidney disease, unspecified acute renal failure type

## 2018-02-23 NOTE — PROGRESS NOTE ADULT - PROBLEM SELECTOR PLAN 4
In setting of renal failure: C/w epogen 4000 U TIW during dialysis. Iron studies shows low Tsat and elevated ferritin. Would need IV iron therapy during dialysis. Monitor Hb.

## 2018-02-23 NOTE — PROGRESS NOTE ADULT - PROBLEM SELECTOR PLAN 7
Stable  Vascular surgery to D/C staples today
C/W renagel  dialysis as per renal t/t/s
Stable  Vascular surgery to D/C staples today

## 2018-02-23 NOTE — PROGRESS NOTE ADULT - NSHPATTENDINGPLANDISCUSS_GEN_ALL_CORE
CCU team on rounds.
Dr. Holley and heme/onc team
CCU
Dr. Andesron, RN, patient and sister.
PICU and sister
Patient, Wife, CCU Fellow
Pr team cardiologist, nursing team, dialysis nurse, pr team PA, house staff
CCU
CCU, patient's sister and patient at bedside.
HD unit and PICU
CCU
CCU team
PICU and sister
HD unit and PICU
patient family, CTICU
dialysis team, House staff

## 2018-02-23 NOTE — PROGRESS NOTE ADULT - SUBJECTIVE AND OBJECTIVE BOX
NP L IJ  Removal Note  [id number]    Pt without complaints.  VSS.    Left IJ cordis removed   Hemostasis achieved with manual pressure for 15 minutes. DSD applied      A/P:  s/p IJ removal  -	continue to monitor  -	-OOB as tolerated

## 2018-02-23 NOTE — PROGRESS NOTE ADULT - PROBLEM SELECTOR PLAN 6
-For intervention today loaded with plavix 600mg will receive angiomax for cath  -TTE today
-s/p cath with RICHARDSON x3 and POBA  -ASA, Plavix  -Increase Metoprolol to 75mg ER daily  -Continue Statin, Isosorbide, Hydralazine
INR 2.77  continue coumadin

## 2018-02-23 NOTE — CHART NOTE - NSCHARTNOTEFT_GEN_A_CORE
Source: Patient [ X]    Family [X ]  Multiple members at bedside, other [X ] RN, Medical record     Pt seen for malnutrition follow up. Pt seen sitting in chart, reports feeling well. Pt alert and was amenable to discussing food preferences. RD collected food preferences and obtained Pt a new breakfast tray with respect to preferences. Encouraged small frequent meals/snacks, reviewed meal and snack options. Reviewed renal diet education and coumadin education with Pt and family at bedside. All nutritional concerns addressed at this time.     Per chart, Pt admitted for hypoxic respiratory failure due to pulmonary edema in the setting of ADHF and PNA. Pt with history of nephrectomy, now with DONNIE on CKD previously on CVVHD, now started on intermittent  HD via PermCath. Pt now s/o high risk PCI with RICHARDSON x3     Diet : Renal, soft, 1.2 L fluid restriction, Nepro x2, no carb pro source x1       Patient reports no GI distress    PO intake:  < 50% [ X]     Source for PO intake [ X] Patient [X ] family [ ] chart [ ] staff [ ] other    Current Weight: 147lbs, decrease noted, ? related to poor PO intake as well as fluid shifts   % Weight Change    Pertinent Medications: MEDICATIONS  (STANDING):  ALBUTerol/ipratropium for Nebulization 3 milliLiter(s) Nebulizer every 6 hours  aspirin enteric coated 81 milliGRAM(s) Oral daily  atorvastatin 80 milliGRAM(s) Oral at bedtime  clopidogrel Tablet 75 milliGRAM(s) Oral daily  docusate sodium 100 milliGRAM(s) Oral daily  epoetin gregoria Injectable 4000 Unit(s) IV Push <User Schedule>  lisinopril 5 milliGRAM(s) Oral daily  melatonin 3 milliGRAM(s) Oral at bedtime  metoprolol succinate  milliGRAM(s) Oral daily  senna 1 Tablet(s) Oral at bedtime  sevelamer hydrochloride 1600 milliGRAM(s) Oral three times a day with meals    MEDICATIONS  (PRN):  diazepam    Tablet 2.5 milliGRAM(s) Oral daily PRN anxiety    Pertinent Labs:  02-23 Na135 mmol/L Glu 100 mg/dL<H> K+ 4.0 mmol/L Cr  4.47 mg/dL<H> BUN 33 mg/dL<H> Phos 2.8 mg/dL Alb 2.7 g/dL<L>     Skin: intact     Estimated Needs:   [ X] no change since previous assessment  [ ] recalculated:       Previous Nutrition Diagnosis:    [ X] Food & Nutrition Related Knowledge Deficit [ X] Malnutrition          Nutrition Diagnosis is [ X] ongoing  [ ] resolved [ ] not applicable          New Nutrition Diagnosis: [ X] not applicable     Interventions:     Recommend    1. Provide food preferences as requested by Pt/family within diet restrictions    2. Encourage PO intake during meal times   3. Reviewed coumadin and renal diet education   4. Continue Nepro x2 and no carb pro source x1   5. meal and snack options reviewed        Monitoring and Evaluation:     [ X] PO intake [ X] Tolerance to diet prescription [X ] weights [x ] follow up per protocol    [ X] other: RD remains available Sarah Siegler RD. Pager #[phone]

## 2018-02-23 NOTE — PROGRESS NOTE ADULT - SUBJECTIVE AND OBJECTIVE BOX
[facility] Division of Kidney Diseases & Hypertension  FOLLOW UP NOTE  [phone]--------------------------------------------------------------------------------  Chief Complaint:Left heart failure      24 hour events/subjective:    No acute events noted.  Had HD yesterday and tolerated it well  No complaints of SOB, CP, abdominal pain, nausea.     PAST HISTORY  --------------------------------------------------------------------------------  No significant changes to PMH, PSH, FHx, SHx, unless otherwise noted    ALLERGIES & MEDICATIONS  --------------------------------------------------------------------------------  Allergies    Cipro (Other)  heparin (Other (Severe))  Levaquin (Other)  penicillin (Hives)    Intolerances      Standing Inpatient Medications  ALBUTerol/ipratropium for Nebulization 3 milliLiter(s) Nebulizer every 6 hours  aspirin enteric coated 81 milliGRAM(s) Oral daily  atorvastatin 80 milliGRAM(s) Oral at bedtime  clopidogrel Tablet 75 milliGRAM(s) Oral daily  docusate sodium 100 milliGRAM(s) Oral daily  epoetin gregoria Injectable 4000 Unit(s) IV Push <User Schedule>  lisinopril 5 milliGRAM(s) Oral daily  melatonin 3 milliGRAM(s) Oral at bedtime  metoprolol succinate  milliGRAM(s) Oral daily  senna 1 Tablet(s) Oral at bedtime  sevelamer hydrochloride 1600 milliGRAM(s) Oral three times a day with meals    PRN Inpatient Medications  diazepam    Tablet 2.5 milliGRAM(s) Oral daily PRN      REVIEW OF SYSTEMS  --------------------------------------------------------------------------------  Gen: No  fevers/chills  Skin: No rashes  Head/Eyes/Ears/Mouth: No headache; Normal hearing; Normal vision w/o blurriness  Respiratory: No dyspnea, cough, wheezing, hemoptysis  CV: No chest pain, PND, orthopnea  GI: No abdominal pain, diarrhea, constipation, nausea, vomiting  : No increased frequency, dysuria, hematuria, nocturia  MSK: No joint pain/swelling; no back pain; no edema  Neuro: No dizziness/lightheadedness, weakness, seizures, numbness, tingling      All other systems were reviewed and are negative, except as noted.    VITALS/PHYSICAL EXAM  --------------------------------------------------------------------------------  T(C): 36.6 (02-23-18 @ 08:00), Max: 36.8 (02-22-18 @ 12:55)  HR: 100 (02-23-18 @ 10:00) (85 - 102)  BP: 110/62 (02-23-18 @ 10:00) (91/51 - 127/69)  RR: 16 (02-23-18 @ 10:00) (13 - 24)  SpO2: 94% (02-23-18 @ 10:00) (92% - 99%)  Wt(kg): --        02-22-18 @ 07:01  -  02-23-18 @ 07:00  --------------------------------------------------------  IN: 1850 mL / OUT: 1600 mL / NET: 250 mL      Physical Exam:  	          Gen: NAD, well-appearing  	HEENT: on room air  	Pulm: CTA B/L  	CV: normal S1S2; no rub  	Abd: soft                      Back : No sacral edema  	: No hall  	LE: no edema  	Skin: Warm, without rashes  	Vascular access: RIJ tunnelled dialysis catheter present, no bleed/ swelling/ discharge around the catheter      LABS/STUDIES  --------------------------------------------------------------------------------              8.0    7.8   >-----------<  255      [02-23-18 @ 05:47]              23.9     135  |  95  |  33  ----------------------------<  100      [02-23-18 @ 05:47]  4.0   |  26  |  4.47        Ca     8.4     [02-23-18 @ 05:47]      Mg     2.2     [02-23-18 @ 05:47]      Phos  2.8     [02-23-18 @ 05:47]    TPro  6.5  /  Alb  2.7  /  TBili  0.5  /  DBili  x   /  AST  27  /  ALT  19  /  AlkPhos  58  [02-23-18 @ 05:47]    PT/INR: PT 30.8 , INR 2.77       [02-23-18 @ 05:47]  PTT: 36.0       [02-23-18 @ 05:47]      Creatinine Trend:  SCr 4.47 [02-23 @ 05:47]  SCr 6.70 [02-22 @ 05:32]  SCr 4.43 [02-21 @ 04:19]  SCr 7.03 [02-20 @ 04:37]  SCr 5.75 [02-19 @ 15:22]        Iron 34, TIBC 220, %sat 15      [02-22-18 @ 07:32]  Ferritin 556.0      [02-22-18 @ 07:38]

## 2018-02-23 NOTE — PROGRESS NOTE ADULT - PROBLEM SELECTOR PROBLEM 6
NSTEMI (non-ST elevated myocardial infarction)
HIT (heparin-induced thrombocytopenia)
NSTEMI (non-ST elevated myocardial infarction)

## 2018-02-23 NOTE — PROGRESS NOTE ADULT - SUBJECTIVE AND OBJECTIVE BOX
Patient is a 77y old  Male who presents with a chief complaint of Shortness of breath (22 Feb 2018 22:58)    HPI:  This is a 77 year old man with past medical history of AAA 5.5cm s/p EVAAR repair with stents on 1/29 with incidental finding of Right Renal Neoplasm, previous history of Left Renal Neoplasm with Left Nephrectomy in 2008, Bladder Cancer, Known LBBB, HTN, HLD transferred from [facility] after initially presenting with SOB which began last night. Patient states that he was discharged from the hospital following AAA repair and recovering well.  He was walking with walker at home with no complaints.  After going to bed, he was awoken from his sleep complaining of SOB with no relief.  He went to [facility] wtih his SOB getting progressively worse with 1 episode of vomitus before going to hospital.  Patient denies fever, chills, recent sick contact, Chest pain, Abdominal pain, diarrhea.  At [place], paient with elevate d-dimers and transferred for possible PE.  At [facility], patient with  RICKI in V2-V4 with elevated Trops, and ADHF requiring Bipap. (04 Feb 2018 17:51)    Overnight Event:    REVIEW OF SYSTEMS:  CONSTITUTIONAL: No weakness, fevers or chills  Eyes/ENT: No visual changes: No vertigo or throat pain  NECK: No pain or stiffness  Resp: No cough, wheezing, hemoptysis; No shortness of breath  Cardiovascular: No chest pain or palpitations  GI: No abdominal or epigastric pain. NO nausea, vomiting, or hematemesis: No diarrhea or constipation. No melena or hematochezia.    : No dysuria, frequent or hematuria.  Neuro: No numbness or weakness  Skin: No itching or rashes	    MEDICATIONS  (STANDING):  ALBUTerol/ipratropium for Nebulization 3 milliLiter(s) Nebulizer every 6 hours  aspirin enteric coated 81 milliGRAM(s) Oral daily  atorvastatin 80 milliGRAM(s) Oral at bedtime  clopidogrel Tablet 75 milliGRAM(s) Oral daily  docusate sodium 100 milliGRAM(s) Oral daily  epoetin gregoria Injectable 4000 Unit(s) IV Push <User Schedule>  lisinopril 5 milliGRAM(s) Oral daily  melatonin 3 milliGRAM(s) Oral at bedtime  metoprolol succinate  milliGRAM(s) Oral daily  senna 1 Tablet(s) Oral at bedtime  sevelamer hydrochloride 1600 milliGRAM(s) Oral three times a day with meals    MEDICATIONS  (PRN):  diazepam    Tablet 2.5 milliGRAM(s) Oral daily PRN anxiety        PHYSICAL EXAM:  Vital Signs Last 24 Hrs  T(C): 36.4 (23 Feb 2018 00:00), Max: 36.8 (22 Feb 2018 12:55)  T(F): 97.5 (23 Feb 2018 00:00), Max: 98.2 (22 Feb 2018 12:55)  HR: 92 (23 Feb 2018 05:46) (77 - 102)  BP: 118/70 (23 Feb 2018 04:00) (91/51 - 121/69)  BP(mean): 84 (23 Feb 2018 04:00) (58 - 85)  RR: 22 (23 Feb 2018 04:00) (13 - 23)  SpO2: 94% (23 Feb 2018 05:46) (93% - 99%)  I&O's Summary    22 Feb 2018 07:01  -  23 Feb 2018 07:00  --------------------------------------------------------  IN: 1850 mL / OUT: 1600 mL / NET: 250 mL        Appearance: Normal	  HEENT:   Normal oral mucosa, PERRL, EOMI	  Lymphatic: No lymphadenopathy  Cardiovascular: Normal S1 S2, No JVD, No murmurs, No edema  Respiratory: Lungs clear to auscultation	  Psychiatry: A & O x 3, Mood & affect appropriate  Gastrointestinal:  Soft, Non-tender, + BS	  Skin: No rashes, No ecchymoses, No cyanosis	  Neurologic: Non-focal  Extremities: Normal range of motion, No clubbing, cyanosis or edema  Vascular: Peripheral pulses palpable 2+ bilaterally    LABS:	 	                        8.0    7.8   )-----------( 255      ( 23 Feb 2018 05:47 )             23.9     Auto Eosinophil # 0.1   / Auto Eosinophil % 1.4   / Auto Neutrophil # 6.2   / Auto Neutrophil % 78.9  / BANDS % x                            8.1    7.8   )-----------( 244      ( 22 Feb 2018 19:21 )             23.6     Auto Eosinophil # 0.1   / Auto Eosinophil % 0.8   / Auto Neutrophil # 6.2   / Auto Neutrophil % 80.1  / BANDS % x                            6.8    8.1   )-----------( 259      ( 22 Feb 2018 06:07 )             19.7     Auto Eosinophil # 0.1   / Auto Eosinophil % 1.0   / Auto Neutrophil # 6.4   / Auto Neutrophil % 79.3  / BANDS % x        INR: 2.77 ratio (02-23 @ 05:47)  INR: 3.66 ratio (02-22 @ 05:32)  INR: 3.12 ratio (02-21 @ 23:28)    02-23    135  |  95<L>  |  33<H>  ----------------------------<  100<H>  4.0   |  26  |  4.47<H>  02-22    133<L>  |  93<L>  |  60<H>  ----------------------------<  98  4.1   |  26  |  6.70<H>    Ca    8.4      23 Feb 2018 05:47  Mg     2.2     02-23  Phos  2.8     02-23  TPro  6.5  /  Alb  2.7<L>  /  TBili  0.5  /  DBili  x   /  AST  27  /  ALT  19  /  AlkPhos  58  02-23  TPro  6.4  /  Alb  2.7<L>  /  TBili  0.4  /  DBili  x   /  AST  27  /  ALT  19  /  AlkPhos  55  02-22        proBNP:   Lipid Profile: 02-04 Chol 145 LDL 87 HDL 26<L> Trig 162<H>  HgA1c: 5.4 % (02-04 @ 21:37)    TSH:     CARDIAC MARKERS:   12 Feb 2018 02:21 Troponin 0.62 ng/mL / Creatine Kinase 61 U/L /  CKMB 2.7 ng/mL / CPK Mass Assay % x       05 Feb 2018 05:05 Troponin 0.73 ng/mL / Creatine Kinase 156 U/L /  CKMB 14.0 ng/mL / CPK Mass Assay % 9.0 %   05 Feb 2018 01:17 Troponin 0.80 ng/mL / Creatine Kinase 158 U/L /  CKMB 15.2 ng/mL / CPK Mass Assay % 9.6 %        TELEMETRY: 	    ECG:  	  RADIOLOGY:  OTHER: 	    PREVIOUS DIAGNOSTIC TESTING:    [ ] Echocardiogram:  [ ]  Catheterization:  [ ] Stress Test:  	  	  Jose Church  Contact # Patient is a 77y old  Male who presents with a chief complaint of Shortness of breath (22 Feb 2018 22:58)    HPI:  This is a 77 year old man with past medical history of AAA 5.5cm s/p EVAAR repair with stents on 1/29 with incidental finding of Right Renal Neoplasm, previous history of Left Renal Neoplasm with Left Nephrectomy in 2008, Bladder Cancer, Known LBBB, HTN, HLD transferred from [facility] after initially presenting with SOB which began last night. Patient states that he was discharged from the hospital following AAA repair and recovering well.  He was walking with walker at home with no complaints.  After going to bed, he was awoken from his sleep complaining of SOB with no relief.  He went to [facility] wtih his SOB getting progressively worse with 1 episode of vomitus before going to hospital.  Patient denies fever, chills, recent sick contact, Chest pain, Abdominal pain, diarrhea.  At [place], paient with elevate d-dimers and transferred for possible PE.  At [facility], patient with  RICKI in V2-V4 with elevated Trops, and ADHF requiring Bipap. (04 Feb 2018 17:51)    Overnight Event: no complaints or events    REVIEW OF SYSTEMS:  CONSTITUTIONAL: No weakness, fevers or chills  Eyes/ENT: No visual changes: No vertigo or throat pain  NECK: No pain or stiffness  Resp: No cough, wheezing, hemoptysis; No shortness of breath  Cardiovascular: No chest pain or palpitations  GI: No abdominal or epigastric pain. NO nausea, vomiting, or hematemesis: No diarrhea or constipation. No melena or hematochezia.    : No dysuria, frequent or hematuria.  Neuro: No numbness or weakness  Skin: No itching or rashes	    MEDICATIONS  (STANDING):  ALBUTerol/ipratropium for Nebulization 3 milliLiter(s) Nebulizer every 6 hours  aspirin enteric coated 81 milliGRAM(s) Oral daily  atorvastatin 80 milliGRAM(s) Oral at bedtime  clopidogrel Tablet 75 milliGRAM(s) Oral daily  docusate sodium 100 milliGRAM(s) Oral daily  epoetin gregoria Injectable 4000 Unit(s) IV Push <User Schedule>  lisinopril 5 milliGRAM(s) Oral daily  melatonin 3 milliGRAM(s) Oral at bedtime  metoprolol succinate  milliGRAM(s) Oral daily  senna 1 Tablet(s) Oral at bedtime  sevelamer hydrochloride 1600 milliGRAM(s) Oral three times a day with meals    MEDICATIONS  (PRN):  diazepam    Tablet 2.5 milliGRAM(s) Oral daily PRN anxiety        PHYSICAL EXAM:  Vital Signs Last 24 Hrs  T(C): 36.4 (23 Feb 2018 00:00), Max: 36.8 (22 Feb 2018 12:55)  T(F): 97.5 (23 Feb 2018 00:00), Max: 98.2 (22 Feb 2018 12:55)  HR: 92 (23 Feb 2018 05:46) (77 - 102)  BP: 118/70 (23 Feb 2018 04:00) (91/51 - 121/69)  BP(mean): 84 (23 Feb 2018 04:00) (58 - 85)  RR: 22 (23 Feb 2018 04:00) (13 - 23)  SpO2: 94% (23 Feb 2018 05:46) (93% - 99%)  I&O's Summary    22 Feb 2018 07:01  -  23 Feb 2018 07:00  --------------------------------------------------------  IN: 1850 mL / OUT: 1600 mL / NET: 250 mL        Appearance: Normal	  HEENT:   Normal oral mucosa, PERRL, EOMI	  Lymphatic: No lymphadenopathy  Cardiovascular: Normal S1 S2, No JVD, No murmurs, No edema  Respiratory: Lungs clear to auscultation	  Psychiatry: A & O x 2, confused at times  Gastrointestinal:  Soft, Non-tender, + BS	  Skin: No rashes, No ecchymoses, No cyanosis	  Neurologic: Non-focal  Extremities: Normal range of motion, No clubbing, cyanosis or edema  Vascular: Peripheral pulses palpable 2+ bilaterally CHEGN Hall cordis    LABS:	 	                        8.0    7.8   )-----------( 255      ( 23 Feb 2018 05:47 )             23.9     Auto Eosinophil # 0.1   / Auto Eosinophil % 1.4   / Auto Neutrophil # 6.2   / Auto Neutrophil % 78.9  / BANDS % x                            8.1    7.8   )-----------( 244      ( 22 Feb 2018 19:21 )             23.6     Auto Eosinophil # 0.1   / Auto Eosinophil % 0.8   / Auto Neutrophil # 6.2   / Auto Neutrophil % 80.1  / BANDS % x                            6.8    8.1   )-----------( 259      ( 22 Feb 2018 06:07 )             19.7     Auto Eosinophil # 0.1   / Auto Eosinophil % 1.0   / Auto Neutrophil # 6.4   / Auto Neutrophil % 79.3  / BANDS % x        INR: 2.77 ratio (02-23 @ 05:47)  INR: 3.66 ratio (02-22 @ 05:32)  INR: 3.12 ratio (02-21 @ 23:28)    02-23    135  |  95<L>  |  33<H>  ----------------------------<  100<H>  4.0   |  26  |  4.47<H>  02-22    133<L>  |  93<L>  |  60<H>  ----------------------------<  98  4.1   |  26  |  6.70<H>    Ca    8.4      23 Feb 2018 05:47  Mg     2.2     02-23  Phos  2.8     02-23  TPro  6.5  /  Alb  2.7<L>  /  TBili  0.5  /  DBili  x   /  AST  27  /  ALT  19  /  AlkPhos  58  02-23  TPro  6.4  /  Alb  2.7<L>  /  TBili  0.4  /  DBili  x   /  AST  27  /  ALT  19  /  AlkPhos  55  02-22        proBNP:   Lipid Profile: 02-04 Chol 145 LDL 87 HDL 26<L> Trig 162<H>  HgA1c: 5.4 % (02-04 @ 21:37)      CARDIAC MARKERS:   12 Feb 2018 02:21 Troponin 0.62 ng/mL / Creatine Kinase 61 U/L /  CKMB 2.7 ng/mL / CPK Mass Assay % x       05 Feb 2018 05:05 Troponin 0.73 ng/mL / Creatine Kinase 156 U/L /  CKMB 14.0 ng/mL / CPK Mass Assay % 9.0 %   05 Feb 2018 01:17 Troponin 0.80 ng/mL / Creatine Kinase 158 U/L /  CKMB 15.2 ng/mL / CPK Mass Assay % 9.6 %        TELEMETRY: 	  SR/ST	  ECG:  	NSR with nonspecific IV block      PREVIOUS DIAGNOSTIC TESTING:    [ ] Echocardiogram:review  [ ]  Catheterization:reviewd    	  Jose Garner ANP-c  Contact #

## 2018-02-23 NOTE — CHART NOTE - NSCHARTNOTEFT_GEN_A_CORE
====================  CCU MIDNIGHT ROUNDS  ====================    DEUCE BALL  [id number]    ====================  SUMMARY:  ====================    78 yo M AAA s/p EVAAR 1/29, R kidney neoplasm (NTD inpt), L kidney neoplasm s/p nephrectomy, bladder Ca, LBBB, HTN, HLD, transferred from OSH w/ ADHF, DONNIE on CKD, s/p CVV (now on HD), multifocal PNA s/p ABX, NSTEMI, s/p rapid a fib ([id number]) c/b HIT on argatroban, now therapeutic on coumadin, s/p permacath by IR 2/16. s/p LHC 2/18 atherectomy and RICHARDSON x 2 pLAD (95%), RICHARDSON x 1 OM1 (95%), POBA D1 90%, unable to open R renal artery    ====================  NEW EVENTS:  ====================    dry cough. no acute events.     ====================  VITALS (Last 12 hrs):  ====================    T(C): 36.7 (02-23-18 @ 19:00), Max: 36.7 (02-23-18 @ 19:00)  HR: 84 (02-23-18 @ 22:00) (84 - 99)  BP: 102/60 (02-23-18 @ 22:00) (99/60 - 127/64)  BP(mean): 73 (02-23-18 @ 22:00) (72 - 88)  RR: 16 (02-23-18 @ 21:00) (16 - 20)  SpO2: 93% (02-23-18 @ 22:00) (90% - 96%)    TELEMETRY: sinus     I&O's Summary    22 Feb 2018 07:01 - 23 Feb 2018 07:00  --------------------------------------------------------  IN: 1850 mL / OUT: 1600 mL / NET: 250 mL    23 Feb 2018 07:01 - 23 Feb 2018 22:47  --------------------------------------------------------  IN: 540 mL / OUT: 0 mL / NET: 540 mL    ====================  PLAN:  ====================  - plavix/coumadin, lipitor, ACe, BB   - toprol, coumadin for AC   - HD per renal     Brianda ZUNIGACNP/[facility]  #[phone]

## 2018-02-23 NOTE — PROGRESS NOTE ADULT - PROBLEM SELECTOR PLAN 1
Patient with DONNIE on CKD in setting of solitary kidney with significant renal artery occlusion, now ESRD: Baseline creatinine prior to admission was between 1.2-1.3. Scr on presentation was 6.48. Complements noted to be WNL.  Patient was initiated on CVVHDF on 2/5/18 and transitioned to IHD. Patient now on intermittent HD. s/p tunneled HD catheter placement. Last HD was done on 2/22/18. No plan for HD today. Family does not want AVF at this time as they believe kidney function can recover. Patient is to be discharged today to rehab center and will have HD on TTS schedule. Monitor BMP daily.

## 2018-02-24 VITALS — SYSTOLIC BLOOD PRESSURE: 107 MMHG | HEART RATE: 102 BPM | DIASTOLIC BLOOD PRESSURE: 57 MMHG

## 2018-02-24 DIAGNOSIS — I48.91 UNSPECIFIED ATRIAL FIBRILLATION: ICD-10-CM

## 2018-02-24 DIAGNOSIS — Z29.9 ENCOUNTER FOR PROPHYLACTIC MEASURES, UNSPECIFIED: ICD-10-CM

## 2018-02-24 DIAGNOSIS — I50.23 ACUTE ON CHRONIC SYSTOLIC (CONGESTIVE) HEART FAILURE: ICD-10-CM

## 2018-02-24 PROCEDURE — 86803 HEPATITIS C AB TEST: CPT

## 2018-02-24 PROCEDURE — 86850 RBC ANTIBODY SCREEN: CPT

## 2018-02-24 PROCEDURE — 82272 OCCULT BLD FECES 1-3 TESTS: CPT

## 2018-02-24 PROCEDURE — 86160 COMPLEMENT ANTIGEN: CPT

## 2018-02-24 PROCEDURE — C8924: CPT

## 2018-02-24 PROCEDURE — 80202 ASSAY OF VANCOMYCIN: CPT

## 2018-02-24 PROCEDURE — 82330 ASSAY OF CALCIUM: CPT

## 2018-02-24 PROCEDURE — 76937 US GUIDE VASCULAR ACCESS: CPT

## 2018-02-24 PROCEDURE — C1724: CPT

## 2018-02-24 PROCEDURE — 99285 EMERGENCY DEPT VISIT HI MDM: CPT | Mod: 25

## 2018-02-24 PROCEDURE — 84132 ASSAY OF SERUM POTASSIUM: CPT

## 2018-02-24 PROCEDURE — 83880 ASSAY OF NATRIURETIC PEPTIDE: CPT

## 2018-02-24 PROCEDURE — 83605 ASSAY OF LACTIC ACID: CPT

## 2018-02-24 PROCEDURE — 93975 VASCULAR STUDY: CPT

## 2018-02-24 PROCEDURE — 77001 FLUOROGUIDE FOR VEIN DEVICE: CPT

## 2018-02-24 PROCEDURE — 96374 THER/PROPH/DIAG INJ IV PUSH: CPT

## 2018-02-24 PROCEDURE — C1817: CPT

## 2018-02-24 PROCEDURE — 75726 ARTERY X-RAYS ABDOMEN: CPT

## 2018-02-24 PROCEDURE — C9600: CPT | Mod: LD

## 2018-02-24 PROCEDURE — 94660 CPAP INITIATION&MGMT: CPT

## 2018-02-24 PROCEDURE — 99153 MOD SED SAME PHYS/QHP EA: CPT

## 2018-02-24 PROCEDURE — 85730 THROMBOPLASTIN TIME PARTIAL: CPT

## 2018-02-24 PROCEDURE — 81001 URINALYSIS AUTO W/SCOPE: CPT

## 2018-02-24 PROCEDURE — 87340 HEPATITIS B SURFACE AG IA: CPT

## 2018-02-24 PROCEDURE — C1750: CPT

## 2018-02-24 PROCEDURE — 75625 CONTRAST EXAM ABDOMINL AORTA: CPT | Mod: XU

## 2018-02-24 PROCEDURE — 85379 FIBRIN DEGRADATION QUANT: CPT

## 2018-02-24 PROCEDURE — 83036 HEMOGLOBIN GLYCOSYLATED A1C: CPT

## 2018-02-24 PROCEDURE — 93321 DOPPLER ECHO F-UP/LMTD STD: CPT

## 2018-02-24 PROCEDURE — 82553 CREATINE MB FRACTION: CPT

## 2018-02-24 PROCEDURE — 85027 COMPLETE CBC AUTOMATED: CPT

## 2018-02-24 PROCEDURE — C1725: CPT

## 2018-02-24 PROCEDURE — 99233 SBSQ HOSP IP/OBS HIGH 50: CPT | Mod: GC

## 2018-02-24 PROCEDURE — 84100 ASSAY OF PHOSPHORUS: CPT

## 2018-02-24 PROCEDURE — 86923 COMPATIBILITY TEST ELECTRIC: CPT

## 2018-02-24 PROCEDURE — C1874: CPT

## 2018-02-24 PROCEDURE — C8929: CPT

## 2018-02-24 PROCEDURE — 82728 ASSAY OF FERRITIN: CPT

## 2018-02-24 PROCEDURE — 82803 BLOOD GASES ANY COMBINATION: CPT

## 2018-02-24 PROCEDURE — 94664 DEMO&/EVAL PT USE INHALER: CPT

## 2018-02-24 PROCEDURE — 99238 HOSP IP/OBS DSCHRG MGMT 30/<: CPT

## 2018-02-24 PROCEDURE — 93308 TTE F-UP OR LMTD: CPT

## 2018-02-24 PROCEDURE — 93931 UPPER EXTREMITY STUDY: CPT

## 2018-02-24 PROCEDURE — 93005 ELECTROCARDIOGRAM TRACING: CPT

## 2018-02-24 PROCEDURE — 83550 IRON BINDING TEST: CPT

## 2018-02-24 PROCEDURE — C1894: CPT

## 2018-02-24 PROCEDURE — 82947 ASSAY GLUCOSE BLOOD QUANT: CPT

## 2018-02-24 PROCEDURE — 87040 BLOOD CULTURE FOR BACTERIA: CPT

## 2018-02-24 PROCEDURE — 97530 THERAPEUTIC ACTIVITIES: CPT

## 2018-02-24 PROCEDURE — 87633 RESP VIRUS 12-25 TARGETS: CPT

## 2018-02-24 PROCEDURE — 82435 ASSAY OF BLOOD CHLORIDE: CPT

## 2018-02-24 PROCEDURE — 80061 LIPID PANEL: CPT

## 2018-02-24 PROCEDURE — 84484 ASSAY OF TROPONIN QUANT: CPT

## 2018-02-24 PROCEDURE — 93454 CORONARY ARTERY ANGIO S&I: CPT | Mod: 59

## 2018-02-24 PROCEDURE — 82565 ASSAY OF CREATININE: CPT

## 2018-02-24 PROCEDURE — 74019 RADEX ABDOMEN 2 VIEWS: CPT

## 2018-02-24 PROCEDURE — 87400 INFLUENZA A/B EACH AG IA: CPT

## 2018-02-24 PROCEDURE — 93306 TTE W/DOPPLER COMPLETE: CPT

## 2018-02-24 PROCEDURE — 36558 INSERT TUNNELED CV CATH: CPT

## 2018-02-24 PROCEDURE — 94640 AIRWAY INHALATION TREATMENT: CPT

## 2018-02-24 PROCEDURE — 74174 CTA ABD&PLVS W/CONTRAST: CPT

## 2018-02-24 PROCEDURE — 97163 PT EVAL HIGH COMPLEX 45 MIN: CPT

## 2018-02-24 PROCEDURE — 86022 PLATELET ANTIBODIES: CPT

## 2018-02-24 PROCEDURE — 71045 X-RAY EXAM CHEST 1 VIEW: CPT

## 2018-02-24 PROCEDURE — 83735 ASSAY OF MAGNESIUM: CPT

## 2018-02-24 PROCEDURE — 84443 ASSAY THYROID STIM HORMONE: CPT

## 2018-02-24 PROCEDURE — C1887: CPT

## 2018-02-24 PROCEDURE — 99261: CPT

## 2018-02-24 PROCEDURE — 86901 BLOOD TYPING SEROLOGIC RH(D): CPT

## 2018-02-24 PROCEDURE — 87086 URINE CULTURE/COLONY COUNT: CPT

## 2018-02-24 PROCEDURE — 93458 L HRT ARTERY/VENTRICLE ANGIO: CPT

## 2018-02-24 PROCEDURE — 85610 PROTHROMBIN TIME: CPT

## 2018-02-24 PROCEDURE — P9016: CPT

## 2018-02-24 PROCEDURE — 96375 TX/PRO/DX INJ NEW DRUG ADDON: CPT

## 2018-02-24 PROCEDURE — 97116 GAIT TRAINING THERAPY: CPT

## 2018-02-24 PROCEDURE — 36245 INS CATH ABD/L-EXT ART 1ST: CPT

## 2018-02-24 PROCEDURE — C1769: CPT

## 2018-02-24 PROCEDURE — 84145 PROCALCITONIN (PCT): CPT

## 2018-02-24 PROCEDURE — 99152 MOD SED SAME PHYS/QHP 5/>YRS: CPT

## 2018-02-24 PROCEDURE — 86900 BLOOD TYPING SEROLOGIC ABO: CPT

## 2018-02-24 PROCEDURE — 97110 THERAPEUTIC EXERCISES: CPT

## 2018-02-24 PROCEDURE — 93010 ELECTROCARDIOGRAM REPORT: CPT

## 2018-02-24 PROCEDURE — 85014 HEMATOCRIT: CPT

## 2018-02-24 PROCEDURE — 36430 TRANSFUSION BLD/BLD COMPNT: CPT

## 2018-02-24 PROCEDURE — 93970 EXTREMITY STUDY: CPT

## 2018-02-24 PROCEDURE — 84295 ASSAY OF SERUM SODIUM: CPT

## 2018-02-24 PROCEDURE — 86706 HEP B SURFACE ANTIBODY: CPT

## 2018-02-24 PROCEDURE — 82550 ASSAY OF CK (CPK): CPT

## 2018-02-24 PROCEDURE — 80053 COMPREHEN METABOLIC PANEL: CPT

## 2018-02-24 RX ORDER — WARFARIN SODIUM 2.5 MG/1
1 TABLET ORAL
Qty: 30 | Refills: 0
Start: 2018-02-24 | End: 2018-03-25

## 2018-02-24 RX ORDER — WARFARIN SODIUM 2.5 MG/1
2 TABLET ORAL ONCE
Qty: 0 | Refills: 0 | Status: DISCONTINUED | OUTPATIENT
Start: 2018-02-24 | End: 2018-02-24

## 2018-02-24 RX ORDER — DIAZEPAM 5 MG
0 TABLET ORAL
Qty: 0 | Refills: 0 | COMMUNITY

## 2018-02-24 RX ORDER — METOPROLOL TARTRATE 50 MG
1 TABLET ORAL
Qty: 0 | Refills: 0 | COMMUNITY

## 2018-02-24 RX ORDER — OXYCODONE AND ACETAMINOPHEN 5; 325 MG/1; MG/1
1 TABLET ORAL ONCE
Qty: 0 | Refills: 0 | Status: DISCONTINUED | OUTPATIENT
Start: 2018-02-24 | End: 2018-02-24

## 2018-02-24 RX ADMIN — Medication 3 MILLILITER(S): at 06:35

## 2018-02-24 RX ADMIN — Medication 3 MILLILITER(S): at 11:49

## 2018-02-24 RX ADMIN — SEVELAMER CARBONATE 1600 MILLIGRAM(S): 2400 POWDER, FOR SUSPENSION ORAL at 09:15

## 2018-02-24 RX ADMIN — CLOPIDOGREL BISULFATE 75 MILLIGRAM(S): 75 TABLET, FILM COATED ORAL at 11:14

## 2018-02-24 RX ADMIN — Medication 100 MILLIGRAM(S): at 11:14

## 2018-02-24 RX ADMIN — ERYTHROPOIETIN 4000 UNIT(S): 10000 INJECTION, SOLUTION INTRAVENOUS; SUBCUTANEOUS at 11:56

## 2018-02-24 RX ADMIN — LISINOPRIL 5 MILLIGRAM(S): 2.5 TABLET ORAL at 05:41

## 2018-02-24 RX ADMIN — Medication 81 MILLIGRAM(S): at 11:14

## 2018-02-24 RX ADMIN — OXYCODONE AND ACETAMINOPHEN 1 TABLET(S): 5; 325 TABLET ORAL at 00:21

## 2018-02-24 RX ADMIN — Medication 100 MILLIGRAM(S): at 05:41

## 2018-02-24 RX ADMIN — Medication 3 MILLILITER(S): at 00:42

## 2018-02-24 RX ADMIN — OXYCODONE AND ACETAMINOPHEN 1 TABLET(S): 5; 325 TABLET ORAL at 00:45

## 2018-02-24 NOTE — PROGRESS NOTE ADULT - PROBLEM SELECTOR PROBLEM 4
Hyperphosphatemia
Tachyarrhythmia
Anemia
Hyperphosphatemia
Anemia
Hyperphosphatemia
Tachyarrhythmia
Acute renal failure superimposed on stage 3 chronic kidney disease, unspecified acute renal failure type
Acute systolic heart failure
Hyperphosphatemia
Hypervolemia, unspecified hypervolemia type

## 2018-02-24 NOTE — PROGRESS NOTE ADULT - ASSESSMENT
77M PMH as stated above; presents 2/16 acute on chronic systolic HF c/b DONNIE on CKD requiring CVV now on HD via permacath.  Hospital course c/b multifocal PNA witrh completed abx course, afib RVR on Coumadin, NSTEMI s/p PCI.  Pending HD today and discharge to [facility].

## 2018-02-24 NOTE — PROGRESS NOTE ADULT - PROBLEM SELECTOR PROBLEM 5
HIT (heparin-induced thrombocytopenia)
CAD (coronary artery disease)
Coronary artery disease involving native coronary artery of native heart without angina pectoris
HIT (heparin-induced thrombocytopenia)
Prophylactic measure

## 2018-02-24 NOTE — PROGRESS NOTE ADULT - PROBLEM SELECTOR PLAN 1
Slight fluid overload, pending HD today   Beta blocker, ACE, statin   Strict I/Os, daily Wts   Low salt DASH  HF teachings

## 2018-02-24 NOTE — PROGRESS NOTE ADULT - PROBLEM SELECTOR PLAN 5
C/W argatroban will bride to coumadin post CC  Platelets have normalized
ASA, plavix, lipitor
C/W argatroban, Coumadin  Platelets have normalized
DVT ppx: therapeutic INR (coumadin)  Dispo:  [facility] today after HD this morning, social work called
agree with revascularization  cont dapt, high intensity statin.

## 2018-02-24 NOTE — PROGRESS NOTE ADULT - PROBLEM SELECTOR PROBLEM 1
DONNIE (acute kidney injury)
HIT (heparin-induced thrombocytopenia)
Acute decompensated heart failure
Acute on chronic systolic (congestive) heart failure
DONNIE (acute kidney injury)

## 2018-02-24 NOTE — PROGRESS NOTE ADULT - PROBLEM SELECTOR PROBLEM 2
Other hypervolemia
CAD (coronary artery disease)
Hyperkalemia
Other hypervolemia
CAD (coronary artery disease)
Hyperkalemia
Other hypervolemia
Anemia, unspecified type
Hyperkalemia
Hypervolemia
NSTEMI (non-ST elevated myocardial infarction)
Hyperkalemia

## 2018-02-24 NOTE — PROGRESS NOTE ADULT - ATTENDING COMMENTS
Patient was seen and examined with CCU team. I agree with findings and plan. COumadin 3mg today then 2.5mg daily.  OOB with PT, Renal to assess for more long term dialysis catheter/plan.
Patient is seen and examined with fellow, NP and the CCU house-staff. I agree with the history, physical and the assessment and plan.  acute systolic heart failure - anuric at this time with DONNIE - on HD - will increase to 200 cc/hr fluid removal  will reat TTE with difinity to evaluate LV function  f/u renal US  will increase the hydralazine to 100
Patient is seen and examined with fellow, NP and the CCU house-staff. I agree with the history, physical and the assessment and plan.  plan for CTA today after a central line will be placed  HD post the CT  with eventual plans of renal artery revascularization
Patient is seen and examined with fellow, NP and the CCU house-staff. I agree with the history, physical and the assessment and plan.  plan for permancatheter today  HD for fluid removal  c/w AC for afib  after the hD, will increase the isordil
Patient is seen and examined with fellow, NP and the CCU house-staff. I agree with the history, physical and the assessment and plan.  planned for renal and cardio angiogram  c/w HD today
Patient is seen and examined with fellow, NP and the CCU house-staff. I agree with the history, physical and the assessment and plan.  s/p EVAR last week with ADHF/AK requring urgent CVVHD  will continue to remove fluid  reeat TTE results noted  will discuss with family the need for future cardiac work up including a cardiac cath - however will most likely need HD post the cath
Patient is seen and examined with fellow, NP and the CCU house-staff. I agree with the history, physical and the assessment and plan.  will attempt HD today - as per renal  presently no evidence of ADHG, angina or arrhythmia - no urgent need for cardiac cath but eventually will need a diagnostic
Continue anticoagulation for PAF.  Continue dual antiplatelet therapy for recent PCI.  HD per renal.
Patient is seen and examined with fellow, NP and the CCU house-staff. I agree with the history, physical and the assessment and plan.  s/p CTA - results noetd  presently fluid overloaded - needs urgent HD with fluid removal  will d/w vascular service regarding the plans for the renal artery stenosis
Patient with acute renal insufficiency requiring hemodialysis after EVAR. Unclear etiology of renal artery occlusion. 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 Appreciate hematology input.  Continue HD and ultrafiltration per renal.  Unable to revascularize the renal artery percutaneously.  Appreciate Urology input regarding RCC and right sided lesion.      Plan for tunnel HD catheter by IR.  Plan for coronary revascularization when medically optimized.
Confirmatory PRADEEP positive   cont argatroban, avoid heparin or heparin products  monitor for bleeding  transfusion support keep Hb >7  monitor plt ct closely  bridge to oral anticoagulation once clinically/hemodynamically stable and plt ct >150
Patient was seen and examined with CCU team. I agree with findings and plan. Transfuse with dialysis today. Subacute rehab, SW to arrange. Hold coumadin today then 2mg daily.
Patient with acute renal insufficiency requiring hemodialysis after EVAR. Unclear etiology of renal artery occlusion. 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 Appreciate hematology input.  Continue HD and ultrafiltration per renal.  Unable to revascularize the renal artery percutaneously.  Appreciate Urology input regarding RCC and right sided lesion.
Plan for re-attempt renal artery revasc tomorrow with dr. Garg.    Must be able to lay flat for procedure
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 Appreciate hematology input.  Continue HD and ultrafiltration per renal.  Unable to revascularize the renal artery today.
I was present during and reviewed clinical and lab data as well as assessment and plan as documented . Please contact if any additional questions with any change in clinical condition or on availability of any additional information or reports.
ESRD now  Volume overloaded  HD today with UF
Seen with daughter bedside  Plan for CTA then dialysis afterwards for clearance and UF- he will need dialysis today  Discussed with CCU team
addendum to previous note: spoke with CCU attending Dr. Marr.  Patient will have angiogram today plan for HD afterward.
He is sp EVAR and now presents with SOB and DONNIE. He has R severe renal artery stenosis and looking back at OR report from last week EVAR, appears that renal vessel was good. So this is likely an acute R WOOD and with a left nephrectomy , became anuric and stress CHF. He might benefit from angioplasty/stent of R WOOD and cardiac cath while staying on dialysis to see if he might have some renal recovery given his solitary kidney. He also has a r renal mass that will need workup later. Meanwhile, d/w with cards and CCU team, Dr Young and will d./w with Urology what the next steps are.  Family concerned about dye load. While there is a risk of contrast induced nephropathy, the risk of anuria from solitary kidney WOOD is more concerning for ongoing CHF and dialysis dependency.   Vacular consult to see and peripheral vascular team also involved for cardiac and renal artery eval.    d/w with Dr Kamille Esteban MD  Cell   Pager   Office 
I have seen this patient with the NP and agree for dc post HD today    Chantale Esteban MD  Cell   Pager   Office 
I have seen this patient with the fellow and agree with their assessment and plan. No urgent need for HD today.  Dc today with tunneled hd catheter on R side. Dialysis has been arranged for t/thurs/sat at Rehab    Chantale Esteban MD  Cell   Pager   Office 
Seen with daughter bedside  CTA performed yesterday  Today is volume overloaded and will plan on increasing UF with dialysis  2K bath   UF 2kg as tolerated  plan on possible daily HD/ UF for volume control
Spoke at length with patient's daughter at bedside and explained to her that the attempt to revascularize the solitary kidney percutaneously was unsuccessful and the patient is considered to be at high risk to undergo open surgical procedure as per cardiology. He will need long term HD as does not have any kidney function at this time. Advised them to undergo evaluation by vascular surgery for AVF creation.  Pt's daughter however stated that she has a sister who is associated with holistic medicine who still has hope that pt's solitary kidney might be salvageable. They would want to have another opinion as outpatient for possible revascularization of the kidney and do not want to consider AVF creation at this point. Explained to her in detail again about the benefits of having AVF over tunnelled dialysis catheter. Daughter verbalized understanding. Family however wants to hold off on seeing vascular surgery at present.
SЕленаDegroot  [phone]
Continue HD per renal.  Plan for cardiac revascularization when optimized.
I saw and evaluated this patient this morning.  Throughout the day I discussed the case with gabriel de luna and danny.  there is no emergent need for HD today but will arrange for HD after CT angiogram.
Plan for renal artery revascularization tomorrow.
The patient is on high flow nasal cannula and will need to lie flat for angiogram.  Plan for 2 UF today before angiogram to optimize.  In order to reduce contrast load will plan for HD also after angiogram for 2 hours.
I have seen the patient and reviewed dialysis prescription and flow sheet. Dialysis access is functioning well. Patient is tolerating dialysis/UF well with no acute symptoms or distress. Dialysis prescription has been adjusted for optimized control of volume status, . Management of additional metabolic abnormalities will continue to be addressed on follow up.
Patient was seen and examined with CCU team. I agree with findings and plan. Dialysis at 10 and d/c to rehab at 4PM.
Patient has improved clinically over the past couple of days.    Continue argatroban gtt for anticoagulation (will be held for cath, during which bivalirudin will be used). Plavix loaded with 600mg x1. Continue ASA, high intensity statin, and beta-blocker.    Plan for left heart catheterization and multiple PCI today. Nephrology aware of plans for cath - as patient is euvolemic this morning, no plans for HD today. Will reassess after cath.
Patient tolerated PCI well yesterday, but his morning was more short of breath, likely representing volume overload as most recent ultrafiltration was two days ago.  Plan for HD this AM. During HD, patient went into PAF and became tachycardia with lower BPs (MAPs in 60s mmHg), so HD was discontinued after two hours.  Patient had relief of dyspnea after HD.  Patient now spontaneously converted to NSR with improvement in blood pressure.    Continue dual antiplatelet therapy, high intensity statin, beta-blocker.  HD per renal.
Patient was seen and examined with CCU team. I agree with findings and plan. D/c to rehab today with coumadin 2mg daily.

## 2018-02-24 NOTE — PROGRESS NOTE ADULT - PROBLEM SELECTOR PLAN 1
ESRD on HD. Seen on HD at bedside. Tolerating treatment well. Plan for transfer to rehab post treatment today.

## 2018-02-24 NOTE — PROGRESS NOTE ADULT - PROVIDER SPECIALTY LIST ADULT
Anesthesia
CCU
Cardiology
Cardiology
Heme/Onc
Infectious Disease
Infectious Disease
Intervent Radiology
Intervent Radiology
Nephrology
Vascular Surgery
CCU
Cardiology
Nephrology
CCU

## 2018-02-24 NOTE — PROGRESS NOTE ADULT - PROBLEM SELECTOR PLAN 4
Right renal stenosis, unsuccessfully attempt to stent   Plan HD this morning  Avoid nephroxins   f/u renal recs

## 2018-02-24 NOTE — PROGRESS NOTE ADULT - PROBLEM SELECTOR PROBLEM 3
Electrolyte and fluid disorder
Hyperphosphatemia
Hyperphosphatemia
Other hypervolemia
Electrolyte and fluid disorder
Hyperphosphatemia
Other hypervolemia
Abdominal aortic aneurysm (AAA) without rupture
Atrial fibrillation, transient
Hyperphosphatemia
Other hypervolemia
Other hypervolemia

## 2018-02-24 NOTE — PROGRESS NOTE ADULT - SUBJECTIVE AND OBJECTIVE BOX
Patient is a 77y old  Male who presents with a chief complaint of Shortness of breath (22 Feb 2018 22:58)      Overnight Event:    REVIEW OF SYSTEMS:  	    MEDICATIONS  (STANDING):  ALBUTerol/ipratropium for Nebulization 3 milliLiter(s) Nebulizer every 6 hours  aspirin enteric coated 81 milliGRAM(s) Oral daily  atorvastatin 80 milliGRAM(s) Oral at bedtime  clopidogrel Tablet 75 milliGRAM(s) Oral daily  docusate sodium 100 milliGRAM(s) Oral daily  epoetin gregoria Injectable 4000 Unit(s) IV Push <User Schedule>  lisinopril 5 milliGRAM(s) Oral daily  melatonin 3 milliGRAM(s) Oral at bedtime  metoprolol succinate  milliGRAM(s) Oral daily  senna 1 Tablet(s) Oral at bedtime  sevelamer hydrochloride 1600 milliGRAM(s) Oral three times a day with meals    MEDICATIONS  (PRN):  diazepam    Tablet 2.5 milliGRAM(s) Oral daily PRN anxiety        PHYSICAL EXAM:  Vital Signs Last 24 Hrs  T(C): 36.8 (24 Feb 2018 05:00), Max: 36.8 (24 Feb 2018 05:00)  T(F): 98.2 (24 Feb 2018 05:00), Max: 98.2 (24 Feb 2018 05:00)  HR: 83 (24 Feb 2018 06:00) (78 - 100)  BP: 118/70 (24 Feb 2018 06:00) (92/54 - 136/73)  BP(mean): 83 (24 Feb 2018 06:00) (66 - 92)  RR: 16 (24 Feb 2018 06:00) (16 - 24)  SpO2: 97% (24 Feb 2018 06:00) (90% - 97%)  I&O's Summary    23 Feb 2018 07:01  -  24 Feb 2018 07:00  --------------------------------------------------------  IN: 540 mL / OUT: 0 mL / NET: 540 mL        Appearance: Normal	  HEENT:   Normal oral mucosa, PERRL, EOMI	  Lymphatic: No lymphadenopathy  Cardiovascular: Normal S1 S2, No JVD, No murmurs, No edema  Respiratory: Lungs clear to auscultation	  Psychiatry: A & O x 3, Mood & affect appropriate  Gastrointestinal:  Soft, Non-tender, + BS	  Skin: No rashes, No ecchymoses, No cyanosis	  Neurologic: Non-focal  Extremities: Normal range of motion, No clubbing, cyanosis or edema  Vascular: Peripheral pulses palpable 2+ bilaterally    LABS:	 	                        8.0    7.8   )-----------( 255      ( 23 Feb 2018 05:47 )             23.9     Auto Eosinophil # 0.1   / Auto Eosinophil % 1.4   / Auto Neutrophil # 6.2   / Auto Neutrophil % 78.9  / BANDS % x                            8.1    7.8   )-----------( 244      ( 22 Feb 2018 19:21 )             23.6     Auto Eosinophil # 0.1   / Auto Eosinophil % 0.8   / Auto Neutrophil # 6.2   / Auto Neutrophil % 80.1  / BANDS % x        INR: 2.77 ratio (02-23 @ 05:47)  INR: 3.66 ratio (02-22 @ 05:32)  INR: 3.12 ratio (02-21 @ 23:28)    02-23    135  |  95<L>  |  33<H>  ----------------------------<  100<H>  4.0   |  26  |  4.47<H>    Ca    8.4      23 Feb 2018 05:47  Mg     2.2     02-23  Phos  2.8     02-23  TPro  6.5  /  Alb  2.7<L>  /  TBili  0.5  /  DBili  x   /  AST  27  /  ALT  19  /  AlkPhos  58  02-23        proBNP:   Lipid Profile: 02-04 Chol 145 LDL 87 HDL 26<L> Trig 162<H>  HgA1c: 5.4 % (02-04 @ 21:37)    TSH:     CARDIAC MARKERS:   12 Feb 2018 02:21 Troponin 0.62 ng/mL / Creatine Kinase 61 U/L /  CKMB 2.7 ng/mL / CPK Mass Assay % x       05 Feb 2018 05:05 Troponin 0.73 ng/mL / Creatine Kinase 156 U/L /  CKMB 14.0 ng/mL / CPK Mass Assay % 9.0 %   05 Feb 2018 01:17 Troponin 0.80 ng/mL / Creatine Kinase 158 U/L /  CKMB 15.2 ng/mL / CPK Mass Assay % 9.6 %    TELEMETRY: 	      MAGGIE Beaver  Contact #[phone] Patient is a 77y old  Male who presents with a chief complaint of Shortness of breath (22 Feb 2018 22:58)    HPI: 77M PMH AAA s/p EVAAR repair 1/29, HTN, HLD, known LBBB, b/l renal neoplasm s/p L nephrectomy, bladder CA.  Presents 2/16 acute on chronic systolic HF c/b DONNIE on CKD requiring CVV now on HD via permacath.  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  Pending HD today and discharge to [facility].     Overnight Event: None     REVIEW OF SYSTEMS:  CONSTITUTIONAL: No weakness, fevers or chills  EYES/ENT: No visual changes;  No vertigo or throat pain   NECK: No pain or stiffness  RESPIRATORY: No cough, wheezing, hemoptysis; No shortness of breath  CARDIOVASCULAR: No chest pain or palpitations  GASTROINTESTINAL: No abdominal or epigastric pain. No nausea, vomiting, or hematemesis; No diarrhea or constipation. No melena or hematochezia.  GENITOURINARY: No dysuria, frequency or hematuria  NEUROLOGICAL: No numbness or weakness  SKIN: No itching, rashes	    MEDICATIONS  (STANDING):  ALBUTerol/ipratropium for Nebulization 3 milliLiter(s) Nebulizer every 6 hours  aspirin enteric coated 81 milliGRAM(s) Oral daily  atorvastatin 80 milliGRAM(s) Oral at bedtime  clopidogrel Tablet 75 milliGRAM(s) Oral daily  docusate sodium 100 milliGRAM(s) Oral daily  epoetin gregoria Injectable 4000 Unit(s) IV Push <User Schedule>  lisinopril 5 milliGRAM(s) Oral daily  melatonin 3 milliGRAM(s) Oral at bedtime  metoprolol succinate  milliGRAM(s) Oral daily  senna 1 Tablet(s) Oral at bedtime  sevelamer hydrochloride 1600 milliGRAM(s) Oral three times a day with meals    MEDICATIONS  (PRN):  diazepam    Tablet 2.5 milliGRAM(s) Oral daily PRN anxiety    PHYSICAL EXAM:  Vital Signs Last 24 Hrs  T(C): 36.8 (24 Feb 2018 05:00), Max: 36.8 (24 Feb 2018 05:00)  T(F): 98.2 (24 Feb 2018 05:00), Max: 98.2 (24 Feb 2018 05:00)  HR: 83 (24 Feb 2018 06:00) (78 - 100)  BP: 118/70 (24 Feb 2018 06:00) (92/54 - 136/73)  BP(mean): 83 (24 Feb 2018 06:00) (66 - 92)  RR: 16 (24 Feb 2018 06:00) (16 - 24)  SpO2: 97% (24 Feb 2018 06:00) (90% - 97%)    I&O's Summary    23 Feb 2018 07:01  -  24 Feb 2018 07:00  --------------------------------------------------------  IN: 540 mL / OUT: 0 mL / NET: 540 mL    Appearance: Normal	  HEENT:   Normal oral mucosa, PERRL, EOMI	  Lymphatic: No lymphadenopathy  Cardiovascular: Normal S1 S2, No JVD, No murmurs, No edema  Respiratory: Lungs clear to auscultation	  Psychiatry: A & O x 3, Mood & affect appropriate  Gastrointestinal:  Soft, Non-tender, + BS	  Skin: No rashes, No ecchymoses, No cyanosis	  Neurologic: Non-focal  Extremities: Normal range of motion, No clubbing, cyanosis or edema  Vascular: Peripheral pulses palpable 2+ bilaterally    LABS:	 	                        8.0    7.8   )-----------( 255      ( 23 Feb 2018 05:47 )             23.9     Auto Eosinophil # 0.1   / Auto Eosinophil % 1.4   / Auto Neutrophil # 6.2   / Auto Neutrophil % 78.9  / BANDS % x                            8.1    7.8   )-----------( 244      ( 22 Feb 2018 19:21 )             23.6     Auto Eosinophil # 0.1   / Auto Eosinophil % 0.8   / Auto Neutrophil # 6.2   / Auto Neutrophil % 80.1  / BANDS % x        INR: 2.77 ratio (02-23 @ 05:47)  INR: 3.66 ratio (02-22 @ 05:32)  INR: 3.12 ratio (02-21 @ 23:28)    02-23    135  |  95<L>  |  33<H>  ----------------------------<  100<H>  4.0   |  26  |  4.47<H>    Ca    8.4      23 Feb 2018 05:47  Mg     2.2     02-23  Phos  2.8     02-23  TPro  6.5  /  Alb  2.7<L>  /  TBili  0.5  /  DBili  x   /  AST  27  /  ALT  19  /  AlkPhos  58  02-23    Lipid Profile: 02-04 Chol 145 LDL 87 HDL 26<L> Trig 162<H>  HgA1c: 5.4 % (02-04 @ 21:37)      CARDIAC MARKERS:   12 Feb 2018 02:21 Troponin 0.62 ng/mL / Creatine Kinase 61 U/L /  CKMB 2.7 ng/mL / CPK Mass Assay % x       05 Feb 2018 05:05 Troponin 0.73 ng/mL / Creatine Kinase 156 U/L /  CKMB 14.0 ng/mL / CPK Mass Assay % 9.0 %   05 Feb 2018 01:17 Troponin 0.80 ng/mL / Creatine Kinase 158 U/L /  CKMB 15.2 ng/mL / CPK Mass Assay % 9.6 %    TELEMETRY: 	      MAGGIE Beaver  Contact #[phone] Patient is a 77y old  Male who presents with a chief complaint of Shortness of breath (22 Feb 2018 22:58)    HPI: 77M PMH AAA s/p EVAAR repair 1/29, HTN, HLD, known LBBB, b/l renal neoplasm s/p L nephrectomy, bladder CA.  Presents 2/16 acute on chronic systolic HF c/b DONNIE on CKD requiring CVV now on HD via permacath.  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  Pending HD today and discharge to [facility].     Overnight Event: None     REVIEW OF SYSTEMS:  CONSTITUTIONAL: No weakness, fevers or chills  EYES/ENT: No visual changes;  No vertigo or throat pain   NECK: No pain or stiffness  RESPIRATORY: No cough, wheezing, hemoptysis; No shortness of breath  CARDIOVASCULAR: No chest pain or palpitations  GASTROINTESTINAL: No abdominal or epigastric pain. No nausea, vomiting, or hematemesis; No diarrhea or constipation. No melena or hematochezia.  GENITOURINARY: No dysuria, frequency or hematuria  NEUROLOGICAL: No numbness or weakness  SKIN: No itching, rashes	    MEDICATIONS  (STANDING):  ALBUTerol/ipratropium for Nebulization 3 milliLiter(s) Nebulizer every 6 hours  aspirin enteric coated 81 milliGRAM(s) Oral daily  atorvastatin 80 milliGRAM(s) Oral at bedtime  clopidogrel Tablet 75 milliGRAM(s) Oral daily  docusate sodium 100 milliGRAM(s) Oral daily  epoetin gregoria Injectable 4000 Unit(s) IV Push <User Schedule>  lisinopril 5 milliGRAM(s) Oral daily  melatonin 3 milliGRAM(s) Oral at bedtime  metoprolol succinate  milliGRAM(s) Oral daily  senna 1 Tablet(s) Oral at bedtime  sevelamer hydrochloride 1600 milliGRAM(s) Oral three times a day with meals    MEDICATIONS  (PRN):  diazepam    Tablet 2.5 milliGRAM(s) Oral daily PRN anxiety    PHYSICAL EXAM:  Vital Signs Last 24 Hrs  T(C): 36.8 (24 Feb 2018 05:00), Max: 36.8 (24 Feb 2018 05:00)  T(F): 98.2 (24 Feb 2018 05:00), Max: 98.2 (24 Feb 2018 05:00)  HR: 83 (24 Feb 2018 06:00) (78 - 100)  BP: 118/70 (24 Feb 2018 06:00) (92/54 - 136/73)  BP(mean): 83 (24 Feb 2018 06:00) (66 - 92)  RR: 16 (24 Feb 2018 06:00) (16 - 24)  SpO2: 97% (24 Feb 2018 06:00) (90% - 97%)    I&O's Summary    23 Feb 2018 07:01  -  24 Feb 2018 07:00  --------------------------------------------------------  IN: 540 mL / OUT: 0 mL / NET: 540 mL    Appearance: Normal	  HEENT:   Normal oral mucosa, PERRL, EOMI	  Lymphatic: No lymphadenopathy  Cardiovascular: Normal S1 S2, + systolic murmur, no JVD  Respiratory: bibasilar crackles minimal at bases 	  Psychiatry: A & O x 2, confused at times  Gastrointestinal:  Soft, Non-tender, + BS	  Skin: No rashes, No ecchymoses, No cyanosis	  Neurologic: Non-focal  Extremities: Normal range of motion, No clubbing, cyanosis or edema  Vascular: Peripheral pulses palpable 2+ bilaterally, + permacath     LABS:	 	                        8.0    7.8   )-----------( 255      ( 23 Feb 2018 05:47 )             23.9     Auto Eosinophil # 0.1   / Auto Eosinophil % 1.4   / Auto Neutrophil # 6.2   / Auto Neutrophil % 78.9  / BANDS % x                            8.1    7.8   )-----------( 244      ( 22 Feb 2018 19:21 )             23.6     Auto Eosinophil # 0.1   / Auto Eosinophil % 0.8   / Auto Neutrophil # 6.2   / Auto Neutrophil % 80.1  / BANDS % x        INR: 2.77 ratio (02-23 @ 05:47)  INR: 3.66 ratio (02-22 @ 05:32)  INR: 3.12 ratio (02-21 @ 23:28)    02-23    135  |  95<L>  |  33<H>  ----------------------------<  100<H>  4.0   |  26  |  4.47<H>    Ca    8.4      23 Feb 2018 05:47  Mg     2.2     02-23  Phos  2.8     02-23  TPro  6.5  /  Alb  2.7<L>  /  TBili  0.5  /  DBili  x   /  AST  27  /  ALT  19  /  AlkPhos  58  02-23    Lipid Profile: 02-04 Chol 145 LDL 87 HDL 26<L> Trig 162<H>  HgA1c: 5.4 % (02-04 @ 21:37)      CARDIAC MARKERS:   12 Feb 2018 02:21 Troponin 0.62 ng/mL / Creatine Kinase 61 U/L /  CKMB 2.7 ng/mL / CPK Mass Assay % x       05 Feb 2018 05:05 Troponin 0.73 ng/mL / Creatine Kinase 156 U/L /  CKMB 14.0 ng/mL / CPK Mass Assay % 9.0 %   05 Feb 2018 01:17 Troponin 0.80 ng/mL / Creatine Kinase 158 U/L /  CKMB 15.2 ng/mL / CPK Mass Assay % 9.6 %    TELEMETRY: 	      Angela Espinal [facility]  Contact #[phone]

## 2018-02-24 NOTE — PROGRESS NOTE ADULT - SUBJECTIVE AND OBJECTIVE BOX
[facility] Division of Kidney Diseases & Hypertension  HEMODIALYSIS NOTE  --------------------------------------------------------------------------------  Chief Complaint: ESRD/Ongoing hemodialysis requirement    24 hour events/subjective: no events overnight     PAST HISTORY  --------------------------------------------------------------------------------  No significant changes to PMH, PSH, FHx, SHx, unless otherwise noted    ALLERGIES & MEDICATIONS  --------------------------------------------------------------------------------  Allergies    Cipro (Other)  heparin (Other (Severe))  Levaquin (Other)  penicillin (Hives)    Intolerances    Standing Inpatient Medications  ALBUTerol/ipratropium for Nebulization 3 milliLiter(s) Nebulizer every 6 hours  aspirin enteric coated 81 milliGRAM(s) Oral daily  atorvastatin 80 milliGRAM(s) Oral at bedtime  clopidogrel Tablet 75 milliGRAM(s) Oral daily  docusate sodium 100 milliGRAM(s) Oral daily  epoetin gregroia Injectable 4000 Unit(s) IV Push <User Schedule>  lisinopril 5 milliGRAM(s) Oral daily  melatonin 3 milliGRAM(s) Oral at bedtime  metoprolol succinate  milliGRAM(s) Oral daily  senna 1 Tablet(s) Oral at bedtime  sevelamer hydrochloride 1600 milliGRAM(s) Oral three times a day with meals    PRN Inpatient Medications  diazepam    Tablet 2.5 milliGRAM(s) Oral daily PRN      REVIEW OF SYSTEMS  --------------------------------------------------------------------------------  Gen: No weight changes, fatigue, fevers/chills, weakness  Skin: No rashes  Head/Eyes/Ears/Mouth: No headache; Normal hearing; Normal vision w/o blurriness; No sinus pain/discomfort, sore throat  Respiratory: No dyspnea, cough, wheezing, hemoptysis  CV: No chest pain, PND, orthopnea  GI: No abdominal pain, diarrhea, constipation, nausea, vomiting, melena, hematochezia  : No increased frequency, dysuria, hematuria, nocturia  MSK: No joint pain/swelling; no back pain; no edema  Neuro: No dizziness/lightheadedness, weakness, seizures, numbness, tingling  Heme: No easy bruising or bleeding  Endo: No heat/cold intolerance  Psych: No significant nervousness, anxiety, stress, depression    All other systems were reviewed and are negative, except as noted.    VITALS/PHYSICAL EXAM  --------------------------------------------------------------------------------  T(C): 36.2 (02-24-18 @ 11:45), Max: 37.1 (02-24-18 @ 07:00)  HR: 113 (02-24-18 @ 14:00) (78 - 113)  BP: 116/72 (02-24-18 @ 14:00) (92/54 - 136/73)  RR: 18 (02-24-18 @ 11:45) (16 - 20)  SpO2: 98% (02-24-18 @ 11:50) (90% - 98%)  Wt(kg): --        02-23-18 @ 07:01  -  02-24-18 @ 07:00  --------------------------------------------------------  IN: 540 mL / OUT: 0 mL / NET: 540 mL    02-24-18 @ 07:01  -  02-24-18 @ 14:30  --------------------------------------------------------  IN: 120 mL / OUT: 0 mL / NET: 120 mL      Physical Exam:  	Gen: NAD, well-appearing  	HEENT: PERRL, supple neck, clear oropharynx  	Pulm: CTA B/L  	CV: RRR, S1S2; no rub  	Back: No spinal or CVA tenderness; no sacral edema  	Abd: +BS, soft, nontender/nondistended  	: No suprapubic tenderness  	UE: Warm, FROM, no clubbing, intact strength; no edema; no asterixis  	LE: Warm, FROM, no clubbing, intact strength; no edema  	Neuro: No focal deficits, intact gait  	Psych: Normal affect and mood  	Skin: Warm, without rashes  	Vascular access:    LABS/STUDIES  --------------------------------------------------------------------------------              8.0    7.8   >-----------<  255      [02-23-18 @ 05:47]              23.9     135  |  95  |  33  ----------------------------<  100      [02-23-18 @ 05:47]  4.0   |  26  |  4.47        Ca     8.4     [02-23-18 @ 05:47]      Mg     2.2     [02-23-18 @ 05:47]      Phos  2.8     [02-23-18 @ 05:47]    TPro  6.5  /  Alb  2.7  /  TBili  0.5  /  DBili  x   /  AST  27  /  ALT  19  /  AlkPhos  58  [02-23-18 @ 05:47]    PT/INR: PT 30.8 , INR 2.77       [02-23-18 @ 05:47]  PTT: 36.0       [02-23-18 @ 05:47]      Iron 34, TIBC 220, %sat 15      [02-22-18 @ 07:32]  Ferritin 556.0      [02-22-18 @ 07:38]

## 2018-02-25 ENCOUNTER — INPATIENT (INPATIENT)
Facility: HOSPITAL | Age: 78
LOS: 14 days | Discharge: ANOTHER HOSPITAL | End: 2018-03-12
Payer: MEDICARE

## 2018-02-25 VITALS
RESPIRATION RATE: 18 BRPM | WEIGHT: 179.9 LBS | OXYGEN SATURATION: 98 % | HEIGHT: 70 IN | SYSTOLIC BLOOD PRESSURE: 98 MMHG | DIASTOLIC BLOOD PRESSURE: 50 MMHG | HEART RATE: 68 BPM

## 2018-02-25 DIAGNOSIS — Z98.890 OTHER SPECIFIED POSTPROCEDURAL STATES: Chronic | ICD-10-CM

## 2018-02-25 DIAGNOSIS — D49.519 NEOPLASM OF UNSPECIFIED BEHAVIOR OF UNSPECIFIED KIDNEY: ICD-10-CM
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
## 2018-02-25 DIAGNOSIS — I25.5 ISCHEMIC CARDIOMYOPATHY: ICD-10-CM

## 2018-02-25 DIAGNOSIS — Z95.5 PRESENCE OF CORONARY ANGIOPLASTY IMPLANT AND GRAFT: Chronic | ICD-10-CM

## 2018-02-25 DIAGNOSIS — N18.5 CHRONIC KIDNEY DISEASE, STAGE 5: ICD-10-CM

## 2018-02-25 DIAGNOSIS — Z98.89 OTHER SPECIFIED POSTPROCEDURAL STATES: Chronic | ICD-10-CM

## 2018-02-25 DIAGNOSIS — D68.9 COAGULATION DEFECT, UNSPECIFIED: ICD-10-CM

## 2018-02-25 DIAGNOSIS — J18.9 PNEUMONIA, UNSPECIFIED ORGANISM: ICD-10-CM

## 2018-02-25 DIAGNOSIS — I25.10 ATHEROSCLEROTIC HEART DISEASE OF NATIVE CORONARY ARTERY WITHOUT ANGINA PECTORIS: ICD-10-CM

## 2018-02-25 DIAGNOSIS — N18.6 END STAGE RENAL DISEASE: ICD-10-CM

## 2018-02-25 DIAGNOSIS — J90 PLEURAL EFFUSION, NOT ELSEWHERE CLASSIFIED: ICD-10-CM

## 2018-02-25 DIAGNOSIS — I48.0 PAROXYSMAL ATRIAL FIBRILLATION: ICD-10-CM

## 2018-02-25 LAB
ABO RH CONFIRMATION: SIGNIFICANT CHANGE UP
ALBUMIN SERPL ELPH-MCNC: 2.4 G/DL — LOW (ref 3.3–5)
ALP SERPL-CCNC: 76 U/L — SIGNIFICANT CHANGE UP (ref 40–120)
ALT FLD-CCNC: 27 U/L — SIGNIFICANT CHANGE UP (ref 12–78)
ANION GAP SERPL CALC-SCNC: 9 MMOL/L — SIGNIFICANT CHANGE UP (ref 5–17)
APTT BLD: 39.3 SEC — HIGH (ref 27.5–37.4)
AST SERPL-CCNC: 27 U/L — SIGNIFICANT CHANGE UP (ref 15–37)
BASOPHILS # BLD AUTO: 0.1 K/UL — SIGNIFICANT CHANGE UP (ref 0–0.2)
BASOPHILS NFR BLD AUTO: 1.4 % — SIGNIFICANT CHANGE UP (ref 0–2)
BILIRUB SERPL-MCNC: 0.6 MG/DL — SIGNIFICANT CHANGE UP (ref 0.2–1.2)
BLD GP AB SCN SERPL QL: SIGNIFICANT CHANGE UP
BUN SERPL-MCNC: 38 MG/DL — HIGH (ref 7–23)
CALCIUM SERPL-MCNC: 8.6 MG/DL — SIGNIFICANT CHANGE UP (ref 8.5–10.1)
CHLORIDE SERPL-SCNC: 99 MMOL/L — SIGNIFICANT CHANGE UP (ref 96–108)
CO2 SERPL-SCNC: 28 MMOL/L — SIGNIFICANT CHANGE UP (ref 22–31)
CREAT SERPL-MCNC: 4.83 MG/DL — HIGH (ref 0.5–1.3)
EOSINOPHIL # BLD AUTO: 0.1 K/UL — SIGNIFICANT CHANGE UP (ref 0–0.5)
EOSINOPHIL NFR BLD AUTO: 0.6 % — SIGNIFICANT CHANGE UP (ref 0–6)
GLUCOSE SERPL-MCNC: 91 MG/DL — SIGNIFICANT CHANGE UP (ref 70–99)
HCT VFR BLD CALC: 30.5 % — LOW (ref 39–50)
HGB BLD-MCNC: 9.7 G/DL — LOW (ref 13–17)
INR BLD: 4.08 RATIO — HIGH (ref 0.88–1.16)
LACTATE SERPL-SCNC: 1.4 MMOL/L — SIGNIFICANT CHANGE UP (ref 0.7–2)
LYMPHOCYTES # BLD AUTO: 1.3 K/UL — SIGNIFICANT CHANGE UP (ref 1–3.3)
LYMPHOCYTES # BLD AUTO: 12 % — LOW (ref 13–44)
MCHC RBC-ENTMCNC: 29.2 PG — SIGNIFICANT CHANGE UP (ref 27–34)
MCHC RBC-ENTMCNC: 31.9 GM/DL — LOW (ref 32–36)
MCV RBC AUTO: 91.5 FL — SIGNIFICANT CHANGE UP (ref 80–100)
MONOCYTES # BLD AUTO: 0.9 K/UL — SIGNIFICANT CHANGE UP (ref 0–0.9)
MONOCYTES NFR BLD AUTO: 8.3 % — SIGNIFICANT CHANGE UP (ref 2–14)
NEUTROPHILS # BLD AUTO: 8.3 K/UL — HIGH (ref 1.8–7.4)
NEUTROPHILS NFR BLD AUTO: 77.8 % — HIGH (ref 43–77)
PLATELET # BLD AUTO: 336 K/UL — SIGNIFICANT CHANGE UP (ref 150–400)
POTASSIUM SERPL-MCNC: 4.3 MMOL/L — SIGNIFICANT CHANGE UP (ref 3.5–5.3)
POTASSIUM SERPL-SCNC: 4.3 MMOL/L — SIGNIFICANT CHANGE UP (ref 3.5–5.3)
PROT SERPL-MCNC: 7.4 GM/DL — SIGNIFICANT CHANGE UP (ref 6–8.3)
PROTHROM AB SERPL-ACNC: 45.3 SEC — HIGH (ref 9.8–12.7)
RBC # BLD: 3.34 M/UL — LOW (ref 4.2–5.8)
RBC # FLD: 14.2 % — SIGNIFICANT CHANGE UP (ref 10.3–14.5)
SODIUM SERPL-SCNC: 136 MMOL/L — SIGNIFICANT CHANGE UP (ref 135–145)
TYPE + AB SCN PNL BLD: SIGNIFICANT CHANGE UP
WBC # BLD: 10.7 K/UL — HIGH (ref 3.8–10.5)
WBC # FLD AUTO: 10.7 K/UL — HIGH (ref 3.8–10.5)

## 2018-02-25 RX ORDER — LIDOCAINE 4 G/100G
1 CREAM TOPICAL DAILY
Qty: 0 | Refills: 0 | Status: DISCONTINUED | OUTPATIENT
Start: 2018-02-25 | End: 2018-03-12

## 2018-02-25 RX ORDER — LANOLIN ALCOHOL/MO/W.PET/CERES
3 CREAM (GRAM) TOPICAL AT BEDTIME
Qty: 0 | Refills: 0 | Status: DISCONTINUED | OUTPATIENT
Start: 2018-02-25 | End: 2018-03-12

## 2018-02-25 RX ORDER — VANCOMYCIN HCL 1 G
1000 VIAL (EA) INTRAVENOUS ONCE
Qty: 0 | Refills: 0 | Status: COMPLETED | OUTPATIENT
Start: 2018-02-25 | End: 2018-02-25

## 2018-02-25 RX ORDER — DIAZEPAM 5 MG
2.5 TABLET ORAL ONCE
Qty: 0 | Refills: 0 | Status: DISCONTINUED | OUTPATIENT
Start: 2018-02-25 | End: 2018-02-25

## 2018-02-25 RX ORDER — CEFEPIME 1 G/1
1000 INJECTION, POWDER, FOR SOLUTION INTRAMUSCULAR; INTRAVENOUS EVERY 12 HOURS
Qty: 0 | Refills: 0 | Status: DISCONTINUED | OUTPATIENT
Start: 2018-02-25 | End: 2018-02-26

## 2018-02-25 RX ORDER — CLOPIDOGREL BISULFATE 75 MG/1
75 TABLET, FILM COATED ORAL DAILY
Qty: 0 | Refills: 0 | Status: DISCONTINUED | OUTPATIENT
Start: 2018-02-25 | End: 2018-03-12

## 2018-02-25 RX ORDER — LATANOPROST 0.05 MG/ML
1 SOLUTION/ DROPS OPHTHALMIC; TOPICAL AT BEDTIME
Qty: 0 | Refills: 0 | Status: DISCONTINUED | OUTPATIENT
Start: 2018-02-25 | End: 2018-03-12

## 2018-02-25 RX ORDER — SEVELAMER CARBONATE 2400 MG/1
800 POWDER, FOR SUSPENSION ORAL
Qty: 0 | Refills: 0 | Status: DISCONTINUED | OUTPATIENT
Start: 2018-02-25 | End: 2018-03-12

## 2018-02-25 RX ORDER — ATORVASTATIN CALCIUM 80 MG/1
80 TABLET, FILM COATED ORAL AT BEDTIME
Qty: 0 | Refills: 0 | Status: DISCONTINUED | OUTPATIENT
Start: 2018-02-25 | End: 2018-03-12

## 2018-02-25 RX ORDER — LISINOPRIL 2.5 MG/1
5 TABLET ORAL DAILY
Qty: 0 | Refills: 0 | Status: DISCONTINUED | OUTPATIENT
Start: 2018-02-25 | End: 2018-03-01

## 2018-02-25 RX ORDER — ACETAMINOPHEN 500 MG
650 TABLET ORAL EVERY 6 HOURS
Qty: 0 | Refills: 0 | Status: DISCONTINUED | OUTPATIENT
Start: 2018-02-25 | End: 2018-03-12

## 2018-02-25 RX ORDER — ASPIRIN/CALCIUM CARB/MAGNESIUM 324 MG
81 TABLET ORAL DAILY
Qty: 0 | Refills: 0 | Status: DISCONTINUED | OUTPATIENT
Start: 2018-02-25 | End: 2018-03-03

## 2018-02-25 RX ORDER — METOPROLOL TARTRATE 50 MG
100 TABLET ORAL DAILY
Qty: 0 | Refills: 0 | Status: DISCONTINUED | OUTPATIENT
Start: 2018-02-25 | End: 2018-02-27

## 2018-02-25 RX ADMIN — Medication 3 MILLIGRAM(S): at 21:08

## 2018-02-25 RX ADMIN — Medication 81 MILLIGRAM(S): at 12:29

## 2018-02-25 RX ADMIN — ATORVASTATIN CALCIUM 80 MILLIGRAM(S): 80 TABLET, FILM COATED ORAL at 21:08

## 2018-02-25 RX ADMIN — Medication 250 MILLIGRAM(S): at 08:16

## 2018-02-25 RX ADMIN — LATANOPROST 1 DROP(S): 0.05 SOLUTION/ DROPS OPHTHALMIC; TOPICAL at 21:10

## 2018-02-25 RX ADMIN — LIDOCAINE 1 PATCH: 4 CREAM TOPICAL at 12:30

## 2018-02-25 RX ADMIN — LISINOPRIL 5 MILLIGRAM(S): 2.5 TABLET ORAL at 12:29

## 2018-02-25 RX ADMIN — LIDOCAINE 1 PATCH: 4 CREAM TOPICAL at 12:29

## 2018-02-25 RX ADMIN — CLOPIDOGREL BISULFATE 75 MILLIGRAM(S): 75 TABLET, FILM COATED ORAL at 12:29

## 2018-02-25 RX ADMIN — Medication 2.5 MILLIGRAM(S): at 19:58

## 2018-02-25 RX ADMIN — Medication 100 MILLIGRAM(S): at 12:29

## 2018-02-25 RX ADMIN — CEFEPIME 100 MILLIGRAM(S): 1 INJECTION, POWDER, FOR SOLUTION INTRAMUSCULAR; INTRAVENOUS at 07:20

## 2018-02-25 RX ADMIN — CEFEPIME 100 MILLIGRAM(S): 1 INJECTION, POWDER, FOR SOLUTION INTRAMUSCULAR; INTRAVENOUS at 17:42

## 2018-02-25 NOTE — ED ADULT TRIAGE NOTE - CHIEF COMPLAINT QUOTE
pt slipped and fell at [facility] earlier today. denies hitting his head. on ASA. fell onto side and is c/o rib pain

## 2018-02-25 NOTE — ED ADULT NURSE REASSESSMENT NOTE - NS ED NURSE REASSESS COMMENT FT1
Patient agitated, aggressive and hostile at times to all staff members. Therapeutic communication skills utilized. VSS, t/p with comfort and safety measures in place. Awaiting transport to , hourly rounding on my time.

## 2018-02-25 NOTE — ED PROVIDER NOTE - CARDIAC, MLM
Normal rate, regular rhythm.  Heart sounds S1, S2.  No murmurs, rubs or gallops. +L chest wall TTP, no crepitus or stepoffs

## 2018-02-25 NOTE — ED ADULT NURSE NOTE - OBJECTIVE STATEMENT
pt c/o of rib pain s/p fall at [facility]. pt alert and oriented, denies head trauma. pt slipped off bed and landed on side and was too weak to get up.

## 2018-02-25 NOTE — H&P ADULT - PROBLEM SELECTOR PLAN 1
IV Cefepime and Vanco. ID consult. Sputum and blood culture. Patient was recently treated in [facility] for PNA as well.

## 2018-02-25 NOTE — H&P ADULT - PROBLEM SELECTOR PLAN 2
Due to ESRD and Cardiomyopathy. Renal to see for dialysis. May need thoracocentesis especially with fall on Coumadin. Monitor Hgb.

## 2018-02-25 NOTE — H&P ADULT - NSHPSOCIALHISTORY_GEN_ALL_CORE
.  4 children. Retired .  Quit smoking 40 years ago. Smoked for few years not in mood to further elaborate at this time.

## 2018-02-25 NOTE — ED ADULT NURSE REASSESSMENT NOTE - NS ED NURSE REASSESS COMMENT FT1
Patient care received from KWAME ANAND. Patient currently sleeping comfortably in bed, VSS. Vancomycin administered as prescribed. Daughter bedside, safety & comfort measures in place. Will continue to monitor.

## 2018-02-25 NOTE — ED ADULT NURSE NOTE - CHIEF COMPLAINT QUOTE
pt slipped and fell at [facility] earlier today. denies hitting his head. on ASA. fell onto side and is c/o rib pain

## 2018-02-25 NOTE — H&P ADULT - ASSESSMENT
76 y/o M hx AAA 5.5 cm s/p EVAAR repair with stents 0n 1/29/18 [facility] with incidental finding of Right renal neoplasm, s/p Left Nephrectomy for renal cancer 2008, Bladder Cancer, LBBB, admitted on Feb. 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 Pt reports he was walking from bed to close his door around 3:00 AM and did not use his walker and fell. He denies hitting head or LOC. Only c/o pain to Left chest wall. Being admitted for multifocal HCAP with bilateral pleural effusion, and left chest wall contusion and left leg pain.

## 2018-02-25 NOTE — H&P ADULT - NSHPPHYSICALEXAM_GEN_ALL_CORE
T(C): 36.8 (02-25-18 @ 07:22), Max: 36.8 (02-25-18 @ 07:22)  HR: 101 (02-25-18 @ 07:22) (68 - 103)  BP: 113/65 (02-25-18 @ 07:22) (98/50 - 113/65)  RR: 18 (02-25-18 @ 07:22) (18 - 18)  SpO2: 100% (02-25-18 @ 07:22) (96% - 100%)  Wt(kg): --  I&O's Summary    PHYSICAL EXAM:  GENERAL: NAD, well-groomed, well-developed  HEAD:  Atraumatic, Normocephalic  EYES: EOMI, PERRLA, conjunctiva and sclera clear  ENMT: No tonsillar erythema, exudates, or enlargement; Moist mucous membranes. No lesions  NECK: Supple, No JVD, Normal thyroid  NERVOUS SYSTEM:  Alert & Oriented X3, Good concentration; Motor Strength 5/5 B/L upper and lower extremities.  CHEST/LUNG: Clear to percussion bilaterally; No rales, rhonchi, wheezing, or rubs  HEART: Regular rate and rhythm; No murmurs, rubs, or gallops  ABDOMEN: Soft, Nontender, Nondistended; Bowel sounds present  EXTREMITIES:  1+ Peripheral Pulses, No clubbing, cyanosis, or edema  LYMPH: No lymphadenopathy noted  SKIN: No rashes or lesions T(C): 36.8 (02-25-18 @ 07:22), Max: 36.8 (02-25-18 @ 07:22)  HR: 101 (02-25-18 @ 07:22) (68 - 103)  BP: 113/65 (02-25-18 @ 07:22) (98/50 - 113/65)  RR: 18 (02-25-18 @ 07:22) (18 - 18)  SpO2: 100% (02-25-18 @ 07:22) (96% - 100%)  Wt(kg): --  I&O's Summary    PHYSICAL EXAM:  GENERAL: NAD, well-groomed, well-developed  HEAD:  Atraumatic, Normocephalic  EYES: EOMI, PERRLA, conjunctiva and sclera clear  ENMT: No tonsillar erythema, exudates, or enlargement; Moist mucous membranes. No lesions  NECK: Supple, No JVD, Normal thyroid  NERVOUS SYSTEM:  Alert & Oriented X3, Good concentration; Motor Strength 5/5 B/L upper and lower extremities.  CHEST/LUNG: Decreased breath sounds at bases. (+) tenderness over left lower chest wall  HEART: Regular rate and rhythm; No murmurs, rubs, or gallops  ABDOMEN: Soft, Nontender, Nondistended; Bowel sounds present  EXTREMITIES:  1+ Peripheral Pulses, No clubbing, cyanosis, or edema  LYMPH: No lymphadenopathy noted  SKIN: No rashes or lesions T(C): 36.8 (02-25-18 @ 07:22), Max: 36.8 (02-25-18 @ 07:22)  HR: 101 (02-25-18 @ 07:22) (68 - 103)  BP: 113/65 (02-25-18 @ 07:22) (98/50 - 113/65)  RR: 18 (02-25-18 @ 07:22) (18 - 18)  SpO2: 100% (02-25-18 @ 07:22) (96% - 100%)  Wt(kg): --  I&O's Summary    PHYSICAL EXAM:  GENERAL: Ill looking. NAD.  HEAD:  Atraumatic, Normocephalic  EYES: EOMI, PERRLA, conjunctiva and sclera clear  ENMT: No tonsillar erythema, exudates, or enlargement; Moist mucous membranes. No lesions  NECK: Supple, No JVD, Normal thyroid  NERVOUS SYSTEM:  Alert & Oriented X3, Good concentration; Motor Strength 5-/5 B/L upper and lower extremities.  CHEST/LUNG: Decreased breath sounds at bases. (+) tenderness over left lower chest wall. (+) right subclavian catheter.   HEART: Regular rate and rhythm; No murmurs, rubs, or gallops  ABDOMEN: Soft, Nontender, Nondistended; Bowel sounds present  EXTREMITIES:  1+ Peripheral Pulses, No clubbing, cyanosis, or edema  LYMPH: No lymphadenopathy noted  SKIN: No rashes or lesions

## 2018-02-25 NOTE — H&P ADULT - FAMILY HISTORY
No pertinent family history in first degree relatives Father  Still living? Unknown  Family history of early CAD, Age at diagnosis: Age Unknown     Mother  Still living? Unknown  Family history of early CAD, Age at diagnosis: Age Unknown

## 2018-02-25 NOTE — ED PROVIDER NOTE - OBJECTIVE STATEMENT
78 y/o M hx ESRD on HD TThS, afib on coumadin, HTN/HLD, CAD on plavix presents from [facility] after fall. Pt reports he was walking from bed to close his door and did not use his walker and fell. He denies hitting head or LOC. Only c/o pain to L chest wall. Denies CP/SOB, light headedness. Reports he only wants XR and refusing bloodwork on arrival. 78 y/o M hx ESRD on HD TThS, afib on coumadin, HTN/HLD, CAD on plavix presents from [facility] after fall. Pt reports he was walking from bed to close his door and did not use his walker and fell. He denies hitting head or LOC. Only c/o pain to L chest wall. Denies CP/SOB, light headedness. Reports he only wants XR and refusing bloodwork on arrival. PMD Wishner, Cardio Karen

## 2018-02-25 NOTE — H&P ADULT - NSHPLABSRESULTS_GEN_ALL_CORE
CT Chest, abdomen and pelvis:  IMPRESSION:     CT CHEST: Left greater than right pleural effusions with interstitial   edema due to mild to moderate CHF. Small pericardial effusion.    Patchy bilateral tree-in-bud airspace opacity likely reflects   superimposed multilobar pneumonia. Follow up to resolution.    CT ABDOMEN/PELVIS: No solid organ or bowel injury nor skeletal trauma in   the abdomen and pelvis.    Aortobiiliac stent graft repair. Please note that graft integrity and   vascular structures cannot be evaluated on noncontrast study.    CT Head and Neck:   No acute intracranial bleeding.     Small left sphenoid sinus fluid level may reflect acute sinusitis in the   appropriate clinical setting.     Right frontal extra-axial cystic structure with smooth remodeling of the   inner table of the frontal calvarium likely reflects a benign structure,   such as an arachnoid cyst or epidermoid. This can be confirmed with MRI.    Moderate chronic microvascular ischemic changes.      No fracture or subluxation. Multilevel spondylosis.    Left pleural effusion. Patchy right upper lobe infiltrate. Biapical   interstitial edema. EKG Sinus tachycardia @ 104 with LBBB morphology-old septal and lateral Q waves.   CT Chest, abdomen and pelvis:  IMPRESSION:     CT CHEST: Left greater than right pleural effusions with interstitial   edema due to mild to moderate CHF. Small pericardial effusion.    Patchy bilateral tree-in-bud airspace opacity likely reflects   superimposed multilobar pneumonia. Follow up to resolution.    CT ABDOMEN/PELVIS: No solid organ or bowel injury nor skeletal trauma in   the abdomen and pelvis.    Aortobiiliac stent graft repair. Please note that graft integrity and   vascular structures cannot be evaluated on noncontrast study.    CT Head and Neck:   No acute intracranial bleeding.     Small left sphenoid sinus fluid level may reflect acute sinusitis in the   appropriate clinical setting.     Right frontal extra-axial cystic structure with smooth remodeling of the   inner table of the frontal calvarium likely reflects a benign structure,   such as an arachnoid cyst or epidermoid. This can be confirmed with MRI.    Moderate chronic microvascular ischemic changes.      No fracture or subluxation. Multilevel spondylosis.    Left pleural effusion. Patchy right upper lobe infiltrate. Biapical   interstitial edema.

## 2018-02-25 NOTE — H&P ADULT - HISTORY OF PRESENT ILLNESS
78 y/o M hx ESRD on HD TThS, afib on coumadin, HTN/HLD, CAD on plavix presents from [facility] after fall. Pt reports he was walking from bed to close his door and did not use his walker and fell. He denies hitting head or LOC. Only c/o pain to L chest wall. Denies CP/SOB, light headedness. Reports he only wants XR and refusing bloodwork on arrival. PMD Wishner, Cardio Karen 78 y/o M hx AAA 5.5 cm s/p EVAAR repair with stents 0n 1/29/18 [facility] with incidental finding of Right renal neoplasm, s/p Left Nephrectomy for renal cancer 2008, Bladder Cancer, LBBB, admitted on Feb. 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 Pt reports he was walking from bed to close his door around 3:00 AM and did not use his walker and fell. He denies hitting head or LOC. Only c/o pain to Left chest wall. Denies CP/SOB, light headedness. Reports he only wants XR and refusing blood work on arrival.     Patient was seen with daughter at bedside and sister. Patient denies nausea, vomiting, bleeding or dysuria. Patient has been making some urine now. No diarrhea or abdominal pain or headache. Some cough.

## 2018-02-25 NOTE — ED PROVIDER NOTE - PROGRESS NOTE DETAILS
Pt agreeable to CT scans. On my initial review of chest CT, there appears to be bilateral consilidations and L sided effusion (he reports the effusion is not new). Pt now agreeable to IV and labs Pt refusing further sticks for 2nd blood culture. OK to start IV abx for multifocal pna with 1st BCx only

## 2018-02-25 NOTE — ED ADULT NURSE REASSESSMENT NOTE - NS ED NURSE REASSESS COMMENT FT1
Pt extremely hardstick. Able to establish IV access to left lower arm. Basic labs, lactate, 1 set of cultures drawn at this time. ED phlebotomist called for 2nd culture. Blood culture not drawn yet but Dr. Hernandez given order to start the antibiotic at this time since pts will benefit from early administration of antibiotic in current pt's condition. ABT to be started at this time.

## 2018-02-25 NOTE — H&P ADULT - ATTENDING COMMENTS
Diet:    RADIOLOGY & ADDITIONAL TESTS:    Imaging Personally Reviewed:  [ ] YES  [ ] NO    Consultant(s) Notes Reviewed:  [ ] YES  [ ] NO      DVT Prophylaxis:  Subcu Heparin [  ]     LMWH [ ]     Coumadin [ ]    Xaeralto [ ]    Eliquis [ ]   Venodyne pumps [ ]    Discussed with Patient [ ]     Family [ ]          [ ]   RN[ ]      [ ]    Advance Directives:    Care Discussed with Consultants/Other Providers [ ] YES  [ ] NO 11. Left chest wall and left pain: No fractures. Lidocaine patch as agreed by patient and family. Avoid Narcotics. Tylenol as needed.       Diet: Renal low salt and cholesterol diet.     RADIOLOGY & ADDITIONAL TESTS:    Imaging Personally Reviewed:  [x ] YES  [ ] NO    Consultant(s) Notes Reviewed:  [ ] YES  [ ] NO      DVT Prophylaxis:  Subcu Heparin [  ]     LMWH [ ]     Coumadin [ x]    Xaeralto [ ]    Eliquis [ ]   Venodyne pumps [ ]    Discussed with Patient [x ]     Family [ x]          [ ]   RN[ x]      [ ]    Advance Directives:  Full code. Palliative team consult if family agrees.     Care Discussed with Consultants/Other Providers [x ] YES  [ ] NO   ED physician, RN and pharmacist.     Discussed care plan with patient, daughter and sister in detail. Questions answered and in agreement. If there is no need to continue IV antibiotics and pleural effusion improves with dialysis then possible discharge in 48 hours.  PMD will be notified. Reviewed medical record from [facility] and rehab. 11. Left chest wall and left pain: No fractures. Lidocaine patch as agreed by patient and family. Avoid Narcotics. Tylenol as needed. X-ray of left femur as no specific area of tenderness elicited during examination.      Diet: Renal low salt and cholesterol diet.     RADIOLOGY & ADDITIONAL TESTS:    Imaging Personally Reviewed:  [x ] YES  [ ] NO    Consultant(s) Notes Reviewed:  [ ] YES  [ ] NO      DVT Prophylaxis:  Subcu Heparin [  ]     LMWH [ ]     Coumadin [ x]    Xaeralto [ ]    Eliquis [ ]   Venodyne pumps [ ]    Discussed with Patient [x ]     Family [ x]          [ ]   RN[ x]      [ ]    Advance Directives:  Full code. Palliative team consult if family agrees.     Care Discussed with Consultants/Other Providers [x ] YES  [ ] NO   ED physician, RN and pharmacist.     Discussed care plan with patient, daughter and sister in detail. Questions answered and in agreement. If there is no need to continue IV antibiotics and pleural effusion improves with dialysis then possible discharge in 48 hours.  PMD will be notified. Reviewed medical record from [facility] and rehab.

## 2018-02-25 NOTE — CONSULT NOTE ADULT - ASSESSMENT
77 with recent EVAAR on 1/29/18 [facility], hx of Left Nephrectomy for renal cancer 2008, Bladder Cancer, re admitted on Feb. 4, 2018 for Acute Systolic CHF requiring HD in addition to Bipap, Afib on coumadin, HTN, HLD, CAD oclusion of Right renal artery presents from [facility] after fall.     ESRD  -Complex recent history but appears to be essentially anuric from occlusion of single kidney system due to arterial occlusion s/p failed interventions  -HD T/T/S, will consider tomorrow to assist with volume status noted on imaging (clincally stable), he currently refuses any treatment prior to tuesday  -Eventual Rehab + HD with discharge is likely   -K protocol  -UF as tolerated  -Eventual AVF with [facility]?, they had refused prior to d/c from [facility]    Fall  -Medicine workup ongoing  -On tele    Renal mass  -Was known from last hospital stay with [facility], will need plan with discharge    Thanks, will follow with you      d/c with family  d/c with staff

## 2018-02-25 NOTE — CONSULT NOTE ADULT - SUBJECTIVE AND OBJECTIVE BOX
Patient is a 77y Male whom presented to the hospital with AAA s/p EVAAR on 1/29/18 [facility] with incidental finding of Right renal neoplasm, hx of Left Nephrectomy for renal cancer 2008, Bladder Cancer, re admitted on Feb. 4, 2018 for Acute Systolic CHF-Echo 2/18/18 EF ~25%, requiring HD in addition to Bipap, Afib on coumadin, HTN, HLD, CAD oclusion of Right renal artery but could not be intervened presents from [facility] after fall. Patient just arrived to [facility] yesterday from prolonged [facility] hospital course, fell at brought to  ED. Patient is a poor historian, agitated and angry, cursing at spouse. No SOB, pain to side stable. Wants to leave.     PAST MEDICAL & SURGICAL HISTORY:  CAD (coronary artery disease)  History of heart artery stent: DIONICIO  Presence of stent in LAD coronary artery  History of nephrectomy: Left  S/P AAA repair      MEDICATIONS  (STANDING):  aspirin  chewable 81 milliGRAM(s) Oral daily  atorvastatin 80 milliGRAM(s) Oral at bedtime  cefepime  IVPB 1000 milliGRAM(s) IV Intermittent every 12 hours  clopidogrel Tablet 75 milliGRAM(s) Oral daily  latanoprost 0.005% Ophthalmic Solution 1 Drop(s) Both EYES at bedtime  lidocaine   Patch 1 Patch Transdermal daily  lidocaine   Patch 1 Patch Transdermal daily  lisinopril 5 milliGRAM(s) Oral daily  melatonin 3 milliGRAM(s) Oral at bedtime  metoprolol succinate  milliGRAM(s) Oral daily  sevelamer hydrochloride 800 milliGRAM(s) Oral three times a day with meals    MEDICATIONS  (PRN):  acetaminophen   Tablet. 650 milliGRAM(s) Oral every 6 hours PRN Mild Pain (1 - 3)      Allergies    heparin (Other)    Intolerances        SOCIAL HISTORY:  no renal disease    FAMILY HISTORY:  Family history of early CAD (Father, Mother)      REVIEW OF SYSTEMS:    CONSTITUTIONAL: stable weas, fevers or chills  EYES/ENT: No visual changes;  No vertigo or throat pain   NECK: No pain or stiffness  RESPIRATORY: No cough, wheezing, hemoptysis; No shortness of breath  CARDIOVASCULAR: No chest pain or palpitations  GASTROINTESTINAL: No abdominal or epigastric pain. No nausea, vomiting, or hematemesis; No diarrhea or constipation. No melena or hematochezia.  GENITOURINARY: No dysuria, frequency or hematuria  NEUROLOGICAL: No numbness or weakness  SKIN: No itching, burning, rashes, or lesions   All other review of systems is negative unless indicated above.      T(C): , Max: 37.2 (02-25-18 @ 12:27)  T(F): , Max: 99 (02-25-18 @ 12:27)  HR: 70 (02-25-18 @ 14:37)  BP: 121/64 (02-25-18 @ 14:37)  BP(mean): --  RR: 16 (02-25-18 @ 14:37)  SpO2: 95% (02-25-18 @ 12:27)  Wt(kg): --    Height (cm): 177.8 (02-25 @ 04:32)  Weight (kg): 81.6 (02-25 @ 04:32)  BMI (kg/m2): 25.8 (02-25 @ 04:32)  BSA (m2): 2 (02-25 @ 04:32)    PHYSICAL EXAM:    Constitutional:frail  HEENT: PERRLA, EOMI,  MMM  Neck: No LAD, No JVD  Respiratory: scatt ronchi  Cardiovascular: S1 and S2, RRR  Gastrointestinal: BS+, soft, NT/ND  Extremities: No peripheral edema  Neurological: A/O x 3, no focal deficits  Psychiatric: agitated, proanity at wife  : No [place]  Skin: No rashes  Access: nasir cath R        LABS:                        9.7    10.7  )-----------( 336      ( 25 Feb 2018 05:23 )             30.5     25 Feb 2018 05:23    136    |  99     |  38     ----------------------------<  91     4.3     |  28     |  4.83     Ca    8.6        25 Feb 2018 05:23    TPro  7.4    /  Alb  2.4    /  TBili  0.6    /  DBili  x      /  AST  27     /  ALT  27     /  AlkPhos  76     25 Feb 2018 05:23          Urine Studies:          RADIOLOGY & ADDITIONAL STUDIES:

## 2018-02-25 NOTE — H&P ADULT - PSH
No significant past surgical history History of heart artery stent  DIONICIO  History of nephrectomy  Left  Presence of stent in LAD coronary artery    S/P AAA repair

## 2018-02-25 NOTE — ED ADULT NURSE REASSESSMENT NOTE - NS ED NURSE REASSESS COMMENT FT1
blood culture collection attempted by ED phlebotomist but unsuccessful. Pt refusing after 1st attempt. MD hull made aware. MD okay with only 1 set of culture. ABT initiated.

## 2018-02-25 NOTE — ED ADULT NURSE NOTE - NS ED PATIENT SAFETY CONCERN
ANKUSH, I saw Marilee for rash again. Trying the fluconazole 1 tablet weekly ×2, continue nystatin if it's helpful, and changing out her shaving routine. Encouraged her to do physical activity when she is feeling really stressed including dancing etc. No

## 2018-02-26 LAB
ALBUMIN SERPL ELPH-MCNC: 2.2 G/DL — LOW (ref 3.3–5)
ALP SERPL-CCNC: 71 U/L — SIGNIFICANT CHANGE UP (ref 40–120)
ALT FLD-CCNC: 28 U/L — SIGNIFICANT CHANGE UP (ref 12–78)
ANION GAP SERPL CALC-SCNC: 11 MMOL/L — SIGNIFICANT CHANGE UP (ref 5–17)
AST SERPL-CCNC: 29 U/L — SIGNIFICANT CHANGE UP (ref 15–37)
BILIRUB SERPL-MCNC: 0.5 MG/DL — SIGNIFICANT CHANGE UP (ref 0.2–1.2)
BUN SERPL-MCNC: 63 MG/DL — HIGH (ref 7–23)
CALCIUM SERPL-MCNC: 8.6 MG/DL — SIGNIFICANT CHANGE UP (ref 8.5–10.1)
CHLORIDE SERPL-SCNC: 98 MMOL/L — SIGNIFICANT CHANGE UP (ref 96–108)
CO2 SERPL-SCNC: 24 MMOL/L — SIGNIFICANT CHANGE UP (ref 22–31)
CREAT SERPL-MCNC: 6.68 MG/DL — HIGH (ref 0.5–1.3)
GLUCOSE SERPL-MCNC: 87 MG/DL — SIGNIFICANT CHANGE UP (ref 70–99)
HAV IGM SER-ACNC: SIGNIFICANT CHANGE UP
HBV CORE IGM SER-ACNC: SIGNIFICANT CHANGE UP
HBV SURFACE AG SER-ACNC: SIGNIFICANT CHANGE UP
HBV SURFACE AG SER-ACNC: SIGNIFICANT CHANGE UP
HCT VFR BLD CALC: 22.5 % — LOW (ref 39–50)
HCT VFR BLD CALC: 23 % — LOW (ref 39–50)
HCT VFR BLD CALC: 26.8 % — LOW (ref 39–50)
HCV AB S/CO SERPL IA: 0.12 S/CO — SIGNIFICANT CHANGE UP
HCV AB SERPL-IMP: SIGNIFICANT CHANGE UP
HGB BLD-MCNC: 7.6 G/DL — LOW (ref 13–17)
HGB BLD-MCNC: 7.6 G/DL — LOW (ref 13–17)
HGB BLD-MCNC: 8.7 G/DL — LOW (ref 13–17)
INR BLD: 4.05 RATIO — HIGH (ref 0.88–1.16)
MCHC RBC-ENTMCNC: 29.4 PG — SIGNIFICANT CHANGE UP (ref 27–34)
MCHC RBC-ENTMCNC: 30.6 PG — SIGNIFICANT CHANGE UP (ref 27–34)
MCHC RBC-ENTMCNC: 32.4 GM/DL — SIGNIFICANT CHANGE UP (ref 32–36)
MCHC RBC-ENTMCNC: 33.8 GM/DL — SIGNIFICANT CHANGE UP (ref 32–36)
MCV RBC AUTO: 90.7 FL — SIGNIFICANT CHANGE UP (ref 80–100)
MCV RBC AUTO: 90.8 FL — SIGNIFICANT CHANGE UP (ref 80–100)
PHOSPHATE SERPL-MCNC: 3.5 MG/DL — SIGNIFICANT CHANGE UP (ref 2.5–4.5)
PLATELET # BLD AUTO: 282 K/UL — SIGNIFICANT CHANGE UP (ref 150–400)
PLATELET # BLD AUTO: 301 K/UL — SIGNIFICANT CHANGE UP (ref 150–400)
POTASSIUM SERPL-MCNC: 4.2 MMOL/L — SIGNIFICANT CHANGE UP (ref 3.5–5.3)
POTASSIUM SERPL-SCNC: 4.2 MMOL/L — SIGNIFICANT CHANGE UP (ref 3.5–5.3)
PROT SERPL-MCNC: 7.1 GM/DL — SIGNIFICANT CHANGE UP (ref 6–8.3)
PROTHROM AB SERPL-ACNC: 45 SEC — HIGH (ref 9.8–12.7)
RBC # BLD: 2.48 M/UL — LOW (ref 4.2–5.8)
RBC # BLD: 2.95 M/UL — LOW (ref 4.2–5.8)
RBC # FLD: 14.3 % — SIGNIFICANT CHANGE UP (ref 10.3–14.5)
RBC # FLD: 14.6 % — HIGH (ref 10.3–14.5)
SODIUM SERPL-SCNC: 133 MMOL/L — LOW (ref 135–145)
WBC # BLD: 8.7 K/UL — SIGNIFICANT CHANGE UP (ref 3.8–10.5)
WBC # BLD: 8.9 K/UL — SIGNIFICANT CHANGE UP (ref 3.8–10.5)
WBC # FLD AUTO: 8.7 K/UL — SIGNIFICANT CHANGE UP (ref 3.8–10.5)
WBC # FLD AUTO: 8.9 K/UL — SIGNIFICANT CHANGE UP (ref 3.8–10.5)

## 2018-02-26 RX ORDER — DIAZEPAM 5 MG
2 TABLET ORAL ONCE
Qty: 0 | Refills: 0 | Status: DISCONTINUED | OUTPATIENT
Start: 2018-02-26 | End: 2018-02-26

## 2018-02-26 RX ADMIN — CEFEPIME 100 MILLIGRAM(S): 1 INJECTION, POWDER, FOR SOLUTION INTRAMUSCULAR; INTRAVENOUS at 06:16

## 2018-02-26 RX ADMIN — CLOPIDOGREL BISULFATE 75 MILLIGRAM(S): 75 TABLET, FILM COATED ORAL at 12:12

## 2018-02-26 RX ADMIN — SEVELAMER CARBONATE 800 MILLIGRAM(S): 2400 POWDER, FOR SUSPENSION ORAL at 19:09

## 2018-02-26 RX ADMIN — LATANOPROST 1 DROP(S): 0.05 SOLUTION/ DROPS OPHTHALMIC; TOPICAL at 22:05

## 2018-02-26 RX ADMIN — Medication 3 MILLIGRAM(S): at 22:04

## 2018-02-26 RX ADMIN — Medication 100 MILLIGRAM(S): at 06:16

## 2018-02-26 RX ADMIN — Medication 81 MILLIGRAM(S): at 12:12

## 2018-02-26 RX ADMIN — ATORVASTATIN CALCIUM 80 MILLIGRAM(S): 80 TABLET, FILM COATED ORAL at 22:04

## 2018-02-26 RX ADMIN — SEVELAMER CARBONATE 800 MILLIGRAM(S): 2400 POWDER, FOR SUSPENSION ORAL at 12:12

## 2018-02-26 RX ADMIN — Medication 650 MILLIGRAM(S): at 19:09

## 2018-02-26 RX ADMIN — Medication 2 MILLIGRAM(S): at 23:27

## 2018-02-26 NOTE — CONSULT NOTE ADULT - ASSESSMENT
Status post fall  Chronic systolic/diastolic congestive heart failure.  Left pleural effusion.  Coronary artery disease status post multiple stents January 2018.  Aortic abdominal aneurysm status post endovascular repair.  Hypertension.  End-stage renal disease he is on hemodialysis.  Renal and bladder cancer in the past status post surgery.  Patient is confused.  Suggest  Observe on telemetry.  Continue with current cardiac medications.  Echocardiogram to evaluate LV function & pericardial effusion.  Follow low sodium low cholesterol diet.  Adjust INR approximately 2.0.  Antibiotics as per hospitalist.  Discussed general condition with patient's daughter.  Discussed with the hospitalist and pulmonologist.

## 2018-02-26 NOTE — PHYSICAL THERAPY INITIAL EVALUATION ADULT - PERTINENT HX OF CURRENT PROBLEM, REHAB EVAL
Pt admitted to  following fall. Pt c/o pain left LE and chest wall. CT head: neg, CT c-spine: no fx, multilevel spondylosis. H/H-8.7/26.8.

## 2018-02-26 NOTE — CONSULT NOTE ADULT - SUBJECTIVE AND OBJECTIVE BOX
Chief complaint: 78 y/o male present with c/o left chest wall and leg pain s/p fell the night PTA    HPI: 78 y/o male with h/o AAA 5.5 cm s/p EVAAR repair with stents 0n 1/29/18 [facility], Right renal neoplasm, s/p Left Nephrectomy for renal cancer 2008, Bladder Cancer, LBBB, s/p Permacath right subclavian for HD due to ESRD, Afib on coumadin, HTN, HLD, CAD s/p 3 stents for Proximal LAD 95%, RAJEEV was admitted on 2/25/18 for left chest wall pain and left leg pain s/p fell the night PTA. Pt states he fell when he was walking without his walker to close his door. Denies head trauma or LOC. Pt reports productive cough x 3 weeks PTA. Pt denies fever, chills, nausea, vomiting, SOB, chest pain, abd pain.   In the ED, pt was found to have patchy b/o airspace opacity and was given Vancomycin and Cefepime.     REVIEW OF SYSTEMS: all other review of systems are negative except as mentioned in HPI    Past Medical History:  CAD (coronary artery disease) s/p 3 stends   HTN  Right Renal Neoplasm  Bladder cancer  Left renal cancer 2008  LBBB  ESRD on HD  Afib on coumadin  HLD    Past Surgical History:  History of heart artery stent  DIONICIO  History of nephrectomy  Left for renal cancer 2008  Presence of stent in LAD coronary artery    S/P AAA repair.    Family History:  Father  Still living? Unknown  Family history of early CAD, Age at diagnosis: Age Unknown     Mother  Still living? Unknown  Family history of early CAD, Age at diagnosis: Age Unknown.    Social History:  4 children. Retired .  Quit smoking 40 years ago  Hospitalized at [facility] on Feb. 4, 2018 for ADHF with EF ~25% and elevated troponin   Recently treated for PNA at [facility]       Allergies    heparin (Other)    Intolerances          MEDICATIONS  (STANDING):  aspirin  chewable 81 milliGRAM(s) Oral daily  atorvastatin 80 milliGRAM(s) Oral at bedtime  cefepime  IVPB 1000 milliGRAM(s) IV Intermittent every 12 hours  clopidogrel Tablet 75 milliGRAM(s) Oral daily  latanoprost 0.005% Ophthalmic Solution 1 Drop(s) Both EYES at bedtime  lidocaine   Patch 1 Patch Transdermal daily  lidocaine   Patch 1 Patch Transdermal daily  lisinopril 5 milliGRAM(s) Oral daily  melatonin 3 milliGRAM(s) Oral at bedtime  metoprolol succinate  milliGRAM(s) Oral daily  sevelamer hydrochloride 800 milliGRAM(s) Oral three times a day with meals    MEDICATIONS  (PRN):  acetaminophen   Tablet. 650 milliGRAM(s) Oral every 6 hours PRN Mild Pain (1 - 3)      VITALS:  Vital Signs Last 24 Hrs  T(C): 36.8 (02-26-18 @ 10:35), Max: 37.2 (02-25-18 @ 12:27)  T(F): 98.3 (02-26-18 @ 10:35), Max: 99 (02-25-18 @ 12:27)  HR: 89 (02-26-18 @ 10:35) (70 - 108)  BP: 107/58 (02-26-18 @ 10:35) (105/53 - 121/64)  BP(mean): --  RR: 18 (02-26-18 @ 10:35) (16 - 18)  SpO2: 94% (02-26-18 @ 10:35) (90% - 95%)        PHYSICAL EXAM:  PHYSICAL EXAM:    Constitutional: frail looking, NAD  HEENT: NC/AT, EOMI, PERRLA  Neck: supple  Back: no tenderness  Respiratory: clear  Cardiovascular: S1S2 regular, no murmurs  Abdomen: soft, nontender, not distended, positive BS  Genitourinary: deferred  Rectal: deferred  Musculoskeletal: no muscle tenderness, no joint swelling or tenderness  Extremities: no pedal edema  Neurological: AxOx3, moving all extremities, no focal deficits  Skin: no rashes        LABS:                          8.7    8.9   )-----------( 301      ( 26 Feb 2018 06:47 )             26.8       02-26    133<L>  |  98  |  63<H>  ----------------------------<  87  4.2   |  24  |  6.68<H>    Ca    8.6      26 Feb 2018 06:47  Phos  3.5     02-26    TPro  7.1  /  Alb  2.2<L>  /  TBili  0.5  /  DBili  x   /  AST  29  /  ALT  28  /  AlkPhos  71  02-26      PT/INR - ( 26 Feb 2018 06:47 )   PT: 45.0 sec;   INR: 4.05 ratio             .Blood Blood-Peripheral  02-25 @ 06:37   No growth to date.  --  --      < from: CT Chest No Cont (02.25.18 @ 06:11) >    EXAM:  CT ABDOMEN AND PELVIS                          EXAM:  CT CHEST                            PROCEDURE DATE:  02/25/2018          INTERPRETATION:  CLINICAL HISTORY:  Trauma. Fall on aspirin.    COMPARISON:  None    TECHNIQUE:  Axial noncontrastCT images of the chest, abdomen, and pelvis   were obtained. Coronal and sagittal reformatted imaged were submitted.   Bone and soft tissue windows were evaluated.     FINDINGS:      CT THORAX    Moderate left and small right pleural effusions with mild bilateral   interstitial edema is noted. Right-sided hemodialysis catheter tip   terminates at the cavoatrial junction. Coronary artery calcifications are   noted. Small pericardial fluid is noted.    Patchy right greater than left upper lobe and bilateral lower lobe   airspace opacity may reflect superimposed infectious or inflammatory   pneumonia.    No mediastinal hematoma, traumatic aortic or great vessel injury,   pneumothorax, hemothorax, or parenchymal contusion is identified. No   acute fracture is seen.     No intrathoracic adenopathy is present. Heart size is normal    CT ABDOMEN PELVIS    Patient's arms overlie the abdomen, producing streak artifact and   degrades image quality.    Nonspecific calcification in the caudate lobe liver is noted.    The spleen, pancreas, and adrenal glands show unremarkable unenhanced   appearance. Gallbladder is unremarkable.     Absent left kidney with adjacent surgical clips likely from nephrectomy.   Multiple right renal cysts and hypoattenuating structures, too small to   characterize.  Small hyperdense structure in the posterior lower right   kidney likely reflects a hemorrhagic cyst versus nonobstructing calculus.    No bowel obstruction or free air.    There is no evidence of skeletal trauma. Multilevel thoracic spondylosis   noted.    Aortobiiliac stent graft repair of an infrarenal abdominal aortic   aneurysm is evident. Patency of the stent graft and overall vascular   evaluation cannot be performed due to noncontrast technique.    IMPRESSION:     CT CHEST: Left greater than right pleural effusions with interstitial   edema due to mild to moderate CHF. Small pericardial effusion.    Patchy bilateral tree-in-bud airspace opacity likely reflects   superimposed multilobar pneumonia. Follow up to resolution.    CT ABDOMEN/PELVIS: No solid organ or bowel injury nor skeletal trauma in   the abdomen and pelvis.    Aortobiiliac stent graft repair. Please note that graft integrity and   vascular structures cannot be evaluated on noncontrast study.    < from: CT Cervical Spine No Cont (02.25.18 @ 06:09) >    EXAM:  CT CERVICAL SPINE                          EXAM:  CT BRAIN                            PROCEDURE DATE:  02/25/2018          INTERPRETATION:  HISTORY: Trauma.    COMPARISON: None     TECHNIQUE: Axial noncontrast CT images of the head and cervical spine   were obtained and submitted for interpretation. Sagittal and coronal   reformatted images of the cervical spine were provided. Bone and soft   tissue windows were evaluated.    FINDINGS:     CT BRAIN:     Right frontal extra-axial cystic structure follows CSF attenuation and   smoothly scallops the inner table of the right frontal calvarium,   measuring 3.5 x 2.0 cm in maximal axial dimensions, most consistent with   arachnoid cyst, epidermoid less likely. No perilesional vasogenic edema.    Moderate chronic microvascular ischemic changes in the periventricular   and deep cerebral white matter is evident. Atheromatous calcifications   along the carotid siphons are noted.    There is no acute intracranial mass-effect, hemorrhage, ormidline shift.   No evidence of hydrocephalus. Basal cisterns are patent.     Small left sphenoid sinus fluid level may reflect acute sinusitis in the   appropriate clinical setting. Remaining sinuses and mastoid air cells are   clear. The calvarium is intact.       CT BRAIN: No acute intracranial bleeding.     Small left sphenoid sinus fluid level may reflect acute sinusitis in the   appropriate clinical setting.     Right frontal extra-axial cystic structure with smooth remodeling of the   inner table of the frontal calvarium likely reflects a benign structure,   such as an arachnoid cyst or epidermoid. This can be confirmed with MRI.    Moderate chronic microvascular ischemic changes.    CT CERVICAL SPINE: No fracture or subluxation. Multilevel spondylosis.    Left pleural effusion. Patchy right upper lobe infiltrate. Biapical   interstitial edema.        < from: Transthoracic Echocardiogram (02.26.18 @ 10:01) >     EXAM:  2D ECHOCARDIOGRAM AD         PROCEDURE DATE:  02/26/2018        INTERPRETATION:  Transthoracic Echocardiography Report (TTE)     Demographics     Patient name        LIZZY TRIPTAHI Age           77 year(s)     Med Rec #           [id number]         Gender        Male     Account #           [id number]           Date of Birth 1940     Interpreting        Medardo Piedra     Room Number   0351   Physician     Referring Physician Karen Gilbert MD   Sonographer   [place],                                                  [facility]     Date of study       02/26/2018 09:43                       AM     Height              70 in             Weight        180.78 pounds    Type of Study:     TTE procedure: 2D echocardiogram     BP: 114/62 mmHg     Study Location: 3ETechnical Quality: Fair    Indications   1) R06.00 - Dyspnea    M-Mode Measurements (cm)     LVEDd: 4.64 cm            LVESd: 3.65 cm   IVSEd: 1.11 cm   LVPWd: 1.01 cm            AO Root Dimension: 3.4 cm                   ACS: 1.5 cm                             LA: 4 cm                             LVOT: 2 cm    Doppler Measurements:     AV Velocity:195 cm/s                 MV Peak E-Wave: 78 cm/s   AV Peak Gradient: 15.21 mmHg         MV Peak A-Wave: 108 cm/s   AV Mean Gradient: 8 mmHg             MV E/A Ratio: 0.72 %   AV Area (Continuity):1.52 cm^2       MV Peak Gradient: 2.43 mmHg   TR Velocity:195 cm/s   TR Gradient:15.21 mmHg   Estimated RAP:5 mmHg   RVSP:20 mmHg     Findings     Mitral Valve   The mitral valve leaflets appear thickened.   Moderate mitral annular calcification is present.   Trace mitral regurgitation is present.   EA reversal of the mitral inflow consistent with reduced compliance of   the   left ventricle.     Aortic Valve   Significant fibrocalcific changes noted to the aortic valve leaflets with   restriction in leaflet excursion. Peak transaortic gradient is 15 mmHg;   this finding is consistent with mild aortic stenosis.   Trace aortic regurgitation is present.     Tricuspid Valve   Normal appearing tricuspid valve structure and function.   Trace tricuspid valve regurgitation is present.     Pulmonic Valve   Normal appearing pulmonic valve structure and function.     Left Atrium   The left atrium is mildly dilated.     Left Ventricle   Endocardium is not always well visualized, however, overall left   ventricular systolic function appears mildly decreased, with mild   hypokinesis of the anterolateral wall. Paradoxical septal motion is also   present. Estimated left ventricular ejection fraction is 45 %.     Right Atrium   Normal appearing right atrium.     Right Ventricle   Normal appearing right ventricle structure and function.     Pericardial Effusion   A mild to moderate sized pericardial effusionis present.     Pleural Effusion   Pleural effusion - is present..     Miscellaneous   IVC is collapsing with inspiration.     Impression     Summary     Endocardium is not always well visualized, however, overall left   ventricular systolic functionappears mildly decreased, with mild   hypokinesis of the anterolateral wall. Paradoxical septal motion is also   present. Estimated left ventricular ejection fraction is 45 %.   Significant fibrocalcific changes noted to the aortic valve leaflets with   restriction in leaflet excursion. Peak transaortic gradient is 15 mmHg;   this finding is consistent with mild aortic stenosis.   Trace aortic regurgitation is present.   The mitral valve leaflets appear thickened.   Moderate mitral annular calcification is present.   Trace mitral regurgitation is present.   EA reversal of the mitral inflow consistent with reduced compliance of   the   left ventricle.   A mild to moderate sized pericardial effusion is present.     Signature     ----------------------------------------------------------------   Electronically signed by Anoop PiedraInterpreting physician)   on 02/26/2018 12:09 PM   ----------------------------------------------------------------    Valves     Mitral Valve     Peak E-Wave: 78 cm/s   Peak A-Wave: 108 cm/s   Peak Gradient: 2.43 mmHg                                                 E/A Ratio: 0.72     Aortic Valve     Peak Velocity: 195 cm/s            Area (continuity): 1.52 cm^2   Peak Gradient: 15.21 mmHg          Mean Gradient: 8 mmHg                                      AV VTI: 34.5 cm   Cusp Separation: 1.5 cm     Tricuspid Valve     TR Velocity: 195 cm/s                Estimated RAP: 5 mmHg   TR Gradient: 15.21 mmHg              Estimated RVSP: 20 mmHg     Pulmonic Valve              Estimated PASP: 20.21 mmHg     LVOT     LVOT Diameter: 2 cm                 Mean Gradient: 1 mmHg                                       LVOT VTI: 16.7 cm    Structures     Left Atrium     LA Dimension: 4 cm         LA Area: 21.4 cm^2   LA/Aorta: 1.18             LA Volume/Index: 76 ml /38m^2     Left Ventricle     Diastolic Dimension: 4.64 cm          Systolic Dimension: 3.65 cm   Septum Diastolic: 1.11 cm   PW Diastolic: 1.01 cm     FS: 21.34 %   LVOT Diameter: 2 cm     Right Atrium     RA Systolic Pressure: 5 mmHg     Right Ventricle              RV Systolic Pressure: 20.21 mmHg     Miscellaneous     Aorta     Aortic Root: 3.4 cm   LVOT Diameter: 2 cm Chief complaint: 78 y/o male present with c/o left chest wall and leg pain s/p fell the night PTA    HPI:   78 y/o male with h/o AAA s/p surgical repair with stents on 1/29/18 [facility], recently diagnosed right renal neoplasm, prior renal Ca s/p left Nephrectomy in 2008, bladder cancer, LBBB, Permacath right subclavian for HD due to ESRD, A.fib on coumadin, HTN, HLD, CAD s/p 3 stents, HIT was admitted on 2/25/18 for left chest wall pain and left leg pain s/p fell the night PTA. Pt stated he fell when he was walking without his walker to close his door. Denies head trauma or LOC. Pt reports productive cough x 3 weeks PTA. Pt denies fever, chills, nausea, vomiting, SOB, chest pain, abd pain. In the ED, pt was found to have patchy b/o airspace opacity and was given Vancomycin and Cefepime.     REVIEW OF SYSTEMS: all other review of systems are negative except as mentioned in HPI    Past Medical History:  CAD (coronary artery disease) s/p 3 stends   HTN  Right Renal Neoplasm  Bladder cancer  Left renal cancer 2008  LBBB  ESRD on HD  Afib on coumadin  HLD    Past Surgical History:  History of heart artery stent  DIONICIO  History of nephrectomy  Left for renal cancer 2008  Presence of stent in LAD coronary artery    S/P AAA repair.    Family History:  Father  Still living? Unknown  Family history of early CAD, Age at diagnosis: Age Unknown     Mother  Still living? Unknown  Family history of early CAD, Age at diagnosis: Age Unknown.    Social History:  4 children. Retired .  Quit smoking 40 years ago  Hospitalized at [facility] on Feb. 4, 2018 for ADHF with EF ~25% and elevated troponin   Recently treated for PNA at [facility]       Allergies    heparin (Other)    Intolerances          MEDICATIONS  (STANDING):  aspirin  chewable 81 milliGRAM(s) Oral daily  atorvastatin 80 milliGRAM(s) Oral at bedtime  cefepime  IVPB 1000 milliGRAM(s) IV Intermittent every 12 hours  clopidogrel Tablet 75 milliGRAM(s) Oral daily  latanoprost 0.005% Ophthalmic Solution 1 Drop(s) Both EYES at bedtime  lidocaine   Patch 1 Patch Transdermal daily  lidocaine   Patch 1 Patch Transdermal daily  lisinopril 5 milliGRAM(s) Oral daily  melatonin 3 milliGRAM(s) Oral at bedtime  metoprolol succinate  milliGRAM(s) Oral daily  sevelamer hydrochloride 800 milliGRAM(s) Oral three times a day with meals    MEDICATIONS  (PRN):  acetaminophen   Tablet. 650 milliGRAM(s) Oral every 6 hours PRN Mild Pain (1 - 3)      VITALS:  Vital Signs Last 24 Hrs  T(C): 36.8 (02-26-18 @ 10:35), Max: 37.2 (02-25-18 @ 12:27)  T(F): 98.3 (02-26-18 @ 10:35), Max: 99 (02-25-18 @ 12:27)  HR: 89 (02-26-18 @ 10:35) (70 - 108)  BP: 107/58 (02-26-18 @ 10:35) (105/53 - 121/64)  BP(mean): --  RR: 18 (02-26-18 @ 10:35) (16 - 18)  SpO2: 94% (02-26-18 @ 10:35) (90% - 95%)        PHYSICAL EXAM:    Constitutional: frail looking, NAD  HEENT: NC/AT, EOMI, PERRLA  Neck: supple  Back: no tenderness  Respiratory: clear  Cardiovascular: S1S2 regular, no murmurs  Abdomen: soft, nontender, not distended, positive BS  Genitourinary: deferred  Rectal: deferred  Musculoskeletal: no muscle tenderness, no joint swelling or tenderness  Extremities: no pedal edema  Neurological: AxOx3, moving all extremities, no focal deficits  Skin: no rashes        LABS:                          8.7    8.9   )-----------( 301      ( 26 Feb 2018 06:47 )             26.8       02-26    133<L>  |  98  |  63<H>  ----------------------------<  87  4.2   |  24  |  6.68<H>    Ca    8.6      26 Feb 2018 06:47  Phos  3.5     02-26    TPro  7.1  /  Alb  2.2<L>  /  TBili  0.5  /  DBili  x   /  AST  29  /  ALT  28  /  AlkPhos  71  02-26      PT/INR - ( 26 Feb 2018 06:47 )   PT: 45.0 sec;   INR: 4.05 ratio             .Blood Blood-Peripheral  02-25 @ 06:37   No growth to date.  --  --      < from: CT Chest No Cont (02.25.18 @ 06:11) >    EXAM:  CT ABDOMEN AND PELVIS                          EXAM:  CT CHEST                            PROCEDURE DATE:  02/25/2018          INTERPRETATION:  CLINICAL HISTORY:  Trauma. Fall on aspirin.    COMPARISON:  None    TECHNIQUE:  Axial noncontrastCT images of the chest, abdomen, and pelvis   were obtained. Coronal and sagittal reformatted imaged were submitted.   Bone and soft tissue windows were evaluated.     FINDINGS:      CT THORAX    Moderate left and small right pleural effusions with mild bilateral   interstitial edema is noted. Right-sided hemodialysis catheter tip   terminates at the cavoatrial junction. Coronary artery calcifications are   noted. Small pericardial fluid is noted.    Patchy right greater than left upper lobe and bilateral lower lobe   airspace opacity may reflect superimposed infectious or inflammatory   pneumonia.    No mediastinal hematoma, traumatic aortic or great vessel injury,   pneumothorax, hemothorax, or parenchymal contusion is identified. No   acute fracture is seen.     No intrathoracic adenopathy is present. Heart size is normal    CT ABDOMEN PELVIS    Patient's arms overlie the abdomen, producing streak artifact and   degrades image quality.    Nonspecific calcification in the caudate lobe liver is noted.    The spleen, pancreas, and adrenal glands show unremarkable unenhanced   appearance. Gallbladder is unremarkable.     Absent left kidney with adjacent surgical clips likely from nephrectomy.   Multiple right renal cysts and hypoattenuating structures, too small to   characterize.  Small hyperdense structure in the posterior lower right   kidney likely reflects a hemorrhagic cyst versus nonobstructing calculus.    No bowel obstruction or free air.    There is no evidence of skeletal trauma. Multilevel thoracic spondylosis   noted.    Aortobiiliac stent graft repair of an infrarenal abdominal aortic   aneurysm is evident. Patency of the stent graft and overall vascular   evaluation cannot be performed due to noncontrast technique.    IMPRESSION:     CT CHEST: Left greater than right pleural effusions with interstitial   edema due to mild to moderate CHF. Small pericardial effusion.    Patchy bilateral tree-in-bud airspace opacity likely reflects   superimposed multilobar pneumonia. Follow up to resolution.    CT ABDOMEN/PELVIS: No solid organ or bowel injury nor skeletal trauma in   the abdomen and pelvis.    Aortobiiliac stent graft repair. Please note that graft integrity and   vascular structures cannot be evaluated on noncontrast study.    < from: CT Cervical Spine No Cont (02.25.18 @ 06:09) >    EXAM:  CT CERVICAL SPINE                          EXAM:  CT BRAIN                            PROCEDURE DATE:  02/25/2018          INTERPRETATION:  HISTORY: Trauma.    COMPARISON: None     TECHNIQUE: Axial noncontrast CT images of the head and cervical spine   were obtained and submitted for interpretation. Sagittal and coronal   reformatted images of the cervical spine were provided. Bone and soft   tissue windows were evaluated.    FINDINGS:     CT BRAIN:     Right frontal extra-axial cystic structure follows CSF attenuation and   smoothly scallops the inner table of the right frontal calvarium,   measuring 3.5 x 2.0 cm in maximal axial dimensions, most consistent with   arachnoid cyst, epidermoid less likely. No perilesional vasogenic edema.    Moderate chronic microvascular ischemic changes in the periventricular   and deep cerebral white matter is evident. Atheromatous calcifications   along the carotid siphons are noted.    There is no acute intracranial mass-effect, hemorrhage, ormidline shift.   No evidence of hydrocephalus. Basal cisterns are patent.     Small left sphenoid sinus fluid level may reflect acute sinusitis in the   appropriate clinical setting. Remaining sinuses and mastoid air cells are   clear. The calvarium is intact.       CT BRAIN: No acute intracranial bleeding.     Small left sphenoid sinus fluid level may reflect acute sinusitis in the   appropriate clinical setting.     Right frontal extra-axial cystic structure with smooth remodeling of the   inner table of the frontal calvarium likely reflects a benign structure,   such as an arachnoid cyst or epidermoid. This can be confirmed with MRI.    Moderate chronic microvascular ischemic changes.    CT CERVICAL SPINE: No fracture or subluxation. Multilevel spondylosis.    Left pleural effusion. Patchy right upper lobe infiltrate. Biapical   interstitial edema.        < from: Transthoracic Echocardiogram (02.26.18 @ 10:01) >     EXAM:  2D ECHOCARDIOGRAM AD         PROCEDURE DATE:  02/26/2018        INTERPRETATION:  Transthoracic Echocardiography Report (TTE)     Demographics     Patient name        LIZZY TRIPATHI Age           77 year(s)     Med Rec #           [id number]         Gender        Male     Account #           [id number]           Date of Birth 1940     Interpreting        Medardo Piedra     Room Number   0351   Physician     Referring Physician Karen Gilbert MD   Sonographer   [place]                                                  [facility]     Date of study       02/26/2018 09:43                       AM     Height              70 in             Weight        180.78 pounds    Type of Study:     TTE procedure: 2D echocardiogram     BP: 114/62 mmHg     Study Location: 3ETechnical Quality: Fair    Indications   1) R06.00 - Dyspnea    M-Mode Measurements (cm)     LVEDd: 4.64 cm            LVESd: 3.65 cm   IVSEd: 1.11 cm   LVPWd: 1.01 cm            AO Root Dimension: 3.4 cm                   ACS: 1.5 cm                             LA: 4 cm                             LVOT: 2 cm    Doppler Measurements:     AV Velocity:195 cm/s                 MV Peak E-Wave: 78 cm/s   AV Peak Gradient: 15.21 mmHg         MV Peak A-Wave: 108 cm/s   AV Mean Gradient: 8 mmHg             MV E/A Ratio: 0.72 %   AV Area (Continuity):1.52 cm^2       MV Peak Gradient: 2.43 mmHg   TR Velocity:195 cm/s   TR Gradient:15.21 mmHg   Estimated RAP:5 mmHg   RVSP:20 mmHg     Findings     Mitral Valve   The mitral valve leaflets appear thickened.   Moderate mitral annular calcification is present.   Trace mitral regurgitation is present.   EA reversal of the mitral inflow consistent with reduced compliance of   the   left ventricle.     Aortic Valve   Significant fibrocalcific changes noted to the aortic valve leaflets with   restriction in leaflet excursion. Peak transaortic gradient is 15 mmHg;   this finding is consistent with mild aortic stenosis.   Trace aortic regurgitation is present.     Tricuspid Valve   Normal appearing tricuspid valve structure and function.   Trace tricuspid valve regurgitation is present.     Pulmonic Valve   Normal appearing pulmonic valve structure and function.     Left Atrium   The left atrium is mildly dilated.     Left Ventricle   Endocardium is not always well visualized, however, overall left   ventricular systolic function appears mildly decreased, with mild   hypokinesis of the anterolateral wall. Paradoxical septal motion is also   present. Estimated left ventricular ejection fraction is 45 %.     Right Atrium   Normal appearing right atrium.     Right Ventricle   Normal appearing right ventricle structure and function.     Pericardial Effusion   A mild to moderate sized pericardial effusionis present.     Pleural Effusion   Pleural effusion - is present..     Miscellaneous   IVC is collapsing with inspiration.     Impression     Summary     Endocardium is not always well visualized, however, overall left   ventricular systolic functionappears mildly decreased, with mild   hypokinesis of the anterolateral wall. Paradoxical septal motion is also   present. Estimated left ventricular ejection fraction is 45 %.   Significant fibrocalcific changes noted to the aortic valve leaflets with   restriction in leaflet excursion. Peak transaortic gradient is 15 mmHg;   this finding is consistent with mild aortic stenosis.   Trace aortic regurgitation is present.   The mitral valve leaflets appear thickened.   Moderate mitral annular calcification is present.   Trace mitral regurgitation is present.   EA reversal of the mitral inflow consistent with reduced compliance of   the   left ventricle.   A mild to moderate sized pericardial effusion is present.     Signature     ----------------------------------------------------------------   Electronically signed by Anoop PiedraInterpreting physician)   on 02/26/2018 12:09 PM   ----------------------------------------------------------------    Valves     Mitral Valve     Peak E-Wave: 78 cm/s   Peak A-Wave: 108 cm/s   Peak Gradient: 2.43 mmHg                                                 E/A Ratio: 0.72     Aortic Valve     Peak Velocity: 195 cm/s            Area (continuity): 1.52 cm^2   Peak Gradient: 15.21 mmHg          Mean Gradient: 8 mmHg                                      AV VTI: 34.5 cm   Cusp Separation: 1.5 cm     Tricuspid Valve     TR Velocity: 195 cm/s                Estimated RAP: 5 mmHg   TR Gradient: 15.21 mmHg              Estimated RVSP: 20 mmHg     Pulmonic Valve              Estimated PASP: 20.21 mmHg     LVOT     LVOT Diameter: 2 cm                 Mean Gradient: 1 mmHg                                       LVOT VTI: 16.7 cm    Structures     Left Atrium     LA Dimension: 4 cm         LA Area: 21.4 cm^2   LA/Aorta: 1.18             LA Volume/Index: 76 ml /38m^2     Left Ventricle     Diastolic Dimension: 4.64 cm          Systolic Dimension: 3.65 cm   Septum Diastolic: 1.11 cm   PW Diastolic: 1.01 cm     FS: 21.34 %   LVOT Diameter: 2 cm     Right Atrium     RA Systolic Pressure: 5 mmHg     Right Ventricle              RV Systolic Pressure: 20.21 mmHg     Miscellaneous     Aorta     Aortic Root: 3.4 cm   LVOT Diameter: 2 cm

## 2018-02-26 NOTE — PHYSICAL THERAPY INITIAL EVALUATION ADULT - PLANNED THERAPY INTERVENTIONS, PT EVAL
transfer training/gait training/Stair training. stefano Escalante, transfers x 20'./bed mobility training

## 2018-02-26 NOTE — PROGRESS NOTE ADULT - SUBJECTIVE AND OBJECTIVE BOX
Patient is a 77y Male who reports no complaints overnight. Seen this AM, oob to chair. walked as per staff    REVIEW OF SYSTEMS:    CONSTITUTIONAL: stable weakness, no fevers or chills  RESPIRATORY: No cough, wheezing, hemoptysis; No shortness of breath  CARDIOVASCULAR: No chest pain or palpitations  GENITOURINARY: No dysuria, frequency or hematuria  All other review of systems is negative unless indicated above.    MEDICATIONS  (STANDING):  aspirin  chewable 81 milliGRAM(s) Oral daily  atorvastatin 80 milliGRAM(s) Oral at bedtime  clopidogrel Tablet 75 milliGRAM(s) Oral daily  latanoprost 0.005% Ophthalmic Solution 1 Drop(s) Both EYES at bedtime  lidocaine   Patch 1 Patch Transdermal daily  lidocaine   Patch 1 Patch Transdermal daily  lisinopril 5 milliGRAM(s) Oral daily  melatonin 3 milliGRAM(s) Oral at bedtime  metoprolol succinate  milliGRAM(s) Oral daily  sevelamer hydrochloride 800 milliGRAM(s) Oral three times a day with meals    MEDICATIONS  (PRN):  acetaminophen   Tablet. 650 milliGRAM(s) Oral every 6 hours PRN Mild Pain (1 - 3)        T(C): , Max: 36.8 (02-26-18 @ 05:57)  T(F): , Max: 98.3 (02-26-18 @ 10:35)  HR: 91 (02-26-18 @ 15:02)  BP: 113/61 (02-26-18 @ 15:02)  BP(mean): --  RR: 16 (02-26-18 @ 15:02)  SpO2: 94% (02-26-18 @ 10:35)  Wt(kg): --        PHYSICAL EXAM:    Constitutional: NAD, well-groomed, well-developed  HEENT: PERRLA, EOMI,  MMM  Neck: No LAD, No JVD  Respiratory:decreased bases  Cardiovascular: S1 and S2, RRR  Gastrointestinal: BS+, soft, NT/ND  Extremities: No peripheral edema  Neurological: A/O x 3, no focal deficits  Psychiatric: Normal mood, normal affect  : No Vallecillo  Skin: No rashes  Access:Jamir cath R        LABS:                        7.6    x     )-----------( x        ( 26 Feb 2018 12:00 )             23.0     26 Feb 2018 06:47    133    |  98     |  63     ----------------------------<  87     4.2     |  24     |  6.68   25 Feb 2018 05:23    136    |  99     |  38     ----------------------------<  91     4.3     |  28     |  4.83     Ca    8.6        26 Feb 2018 06:47  Ca    8.6        25 Feb 2018 05:23  Phos  3.5       26 Feb 2018 06:47    TPro  7.1    /  Alb  2.2    /  TBili  0.5    /  DBili  x      /  AST  29     /  ALT  28     /  AlkPhos  71     26 Feb 2018 06:47  TPro  7.4    /  Alb  2.4    /  TBili  0.6    /  DBili  x      /  AST  27     /  ALT  27     /  AlkPhos  76     25 Feb 2018 05:23      Hepatitis C Virus S/CO Ratio: 0.12 S/CO (02-25 @ 16:34)  Hepatitis C Virus Interpretation: Nonreact (02-25 @ 16:34)      Urine Studies:          RADIOLOGY & ADDITIONAL STUDIES:

## 2018-02-26 NOTE — PROGRESS NOTE ADULT - SUBJECTIVE AND OBJECTIVE BOX
HOSPITAL COURSE AND ASSESSMENT  78 y/o M hx AAA 5.5 cm s/p EVAAR repair with stents recently discharged on 1/29/18 [facility] with incidental finding of Right renal neoplasm, s/p Left Nephrectomy for renal cancer 2008, Bladder Cancer, LBBB, admitted on Feb. 4, 2018 for ADHF due to Acute Systolic CHF-Echo 2/18/18 EF ~25%, and Elevated Troponin requiring HD in addition to BIPAP s/p Permacath right subclavian for HD due to ESRD on HD TThS, Afib on coumadin, HTN, HLD, CAD s/p 3 stents for Proximal LAD 95%, D2 90% and OM1 90% RICHARDSON, RAJEEV, also treated for PNA HCAP and Occlusion of Right renal artery but could not be intervened presents from [facility] after fall. Pt reports he was walking from bed to close his door around 3:00 AM and did not use his walker and fell. He denies hitting head or LOC. Only c/o pain to Left chest wall. Denies CP/SOB, light headedness. Reports he only wants XR and refused blood work on arrival. Patient is a poor historian, agitated and angry, cursing at spouse.  Wants to leave.       Pt was admitted to telemetry for treatment of acute on chronic CHF exacerbation. Multiple consultants involved in care with ID, Pulm, Cards, Renal all following in house.    Record review from [facility] this PM          *Acute on chronic systolic/distolic CHF due to ischemic cardiomyopathy/CAD s/p stent placement with pleural effusion  -echo 1/2018:EF 25%, repeat pending this admission  -recent RICHARDSON to proximal LAD, diagonal 2 and 1 circumflex  -known risk factors: HTN, hyperlipidemia  -telemetry  -Aspirin and Plavix, Toprol XL. Statin, Lisinopril  -pt refusing extra HD for volume  -Cardiology following      *ESRD, HD initiated 1/2018  -Complex recent history but appears to be essentially anuric from occlusion of single kidney system due to arterial occlusion s/p failed interventions  -Known R renal tumor, s/p L nephrectomy  -HD T/T/S PTA via permacath  -Eventual AVF with [facility]?, they had refused prior to d/c from [facility]  -currently refuses any treatment prior to tuesday despite volume overload  -MVI to replace water soluble vitamins lost in HD    *Fall  -trauma evaluation with panscan, plain films  -PT evaluation  -likely return to SNF on discharge    *HCAP without sepsis  -Possible imaging is showing pneumonia from [facility] that has not reached radiographic resolution yet rather than new pneumia  -source evaluation ongoing with blood culture no growth to date, sputum culture ordered  -CT CHEST: L> R pleural effusions with interstitial edema due to mild to moderate CHF. Small pericardial effusion.  Patchy bilateral tree-in-bud airspace opacity likely reflects superimposed multilobar pneumonia. Follow up to resolution.  -Cefepime and Vanco day 2  -ID consult.     *PAF (paroxysmal atrial fibrillation) on chronic anticoagulation with supratherapeutic INR  -Rate control with toprol  -CHADS2=3. Coumadin on hold due to elevated INR.  -INR 4, holding daily dosing        carotid artery disease  nonobstructive carotid artery disease by MRA of the neck in April 2017 he has  50-60% left ICA stenosis    Anemia  Epogen with HD. Guaiac negative 02/18    Aortic abdominal aneurysm s/p endovascular repair  Renal and bladder cancer in the past status post surgery  chronic left bundle branch block  history of bladder cancer status post left nephrectomy      ----------------------------------------------------------------------------------------------  CHIEF COMPLAINT:  fall    SUBJECTIVE:     tolerating PO. OOB. cantakerous. refuses much discussion. wife at bedside with multiple concerns    REVIEW OF SYSTEMS:  All other review of systems is negative unless indicated above    Vital Signs Last 24 Hrs  T(C): 36.8 (26 Feb 2018 10:35), Max: 37.2 (25 Feb 2018 12:27)  T(F): 98.3 (26 Feb 2018 10:35), Max: 99 (25 Feb 2018 12:27)  HR: 89 (26 Feb 2018 10:35) (70 - 108)  BP: 107/58 (26 Feb 2018 10:35) (105/53 - 121/64)  BP(mean): --  RR: 18 (26 Feb 2018 10:35) (16 - 18)  SpO2: 94% (26 Feb 2018 10:35) (90% - 95%)  CAPILLARY BLOOD GLUCOSE          PHYSICAL EXAM:  Constitutional: NAD, NCAT  HEENT: EOMI, Normal Hearing, MMM, fair dentition  Neck: trachea midline, No JVD  Respiratory: Breath sounds with crackles, unlabored respiration  Cardiovascular: S1 and S2, regular rate   Gastrointestinal: Bowel Sounds present, soft, nontender, nondistended  Extremities: peripheral edema  Vascular: 2+ radial pulse  Neurological: awake, alert, follows commands, sensation grossly intact  Psych: appropriate affect,  insight  Musculoskeletal: moves all extremities  Skin: No rashes    ALLERGIES  heparin (Other)      MEDICATIONS  (STANDING):  aspirin  chewable 81 milliGRAM(s) Oral daily  atorvastatin 80 milliGRAM(s) Oral at bedtime  cefepime  IVPB 1000 milliGRAM(s) IV Intermittent every 12 hours  clopidogrel Tablet 75 milliGRAM(s) Oral daily  latanoprost 0.005% Ophthalmic Solution 1 Drop(s) Both EYES at bedtime  lidocaine   Patch 1 Patch Transdermal daily  lidocaine   Patch 1 Patch Transdermal daily  lisinopril 5 milliGRAM(s) Oral daily  melatonin 3 milliGRAM(s) Oral at bedtime  metoprolol succinate  milliGRAM(s) Oral daily  sevelamer hydrochloride 800 milliGRAM(s) Oral three times a day with meals      LABS: All Labs Reviewed:                        8.7    8.9   )-----------( 301      ( 26 Feb 2018 06:47 )             26.8     02-26    133<L>  |  98  |  63<H>  ----------------------------<  87  4.2   |  24  |  6.68<H>    Ca    8.6      26 Feb 2018 06:47  Phos  3.5     02-26    TPro  7.1  /  Alb  2.2<L>  /  TBili  0.5  /  DBili  x   /  AST  29  /  ALT  28  /  AlkPhos  71  02-26    PT/INR - ( 26 Feb 2018 06:47 )   PT: 45.0 sec;   INR: 4.05 ratio         PTT - ( 25 Feb 2018 05:23 )  PTT:39.3 sec      Blood Culture: 02-25 @ 06:37  Organism --  Gram Stain Blood -- Gram Stain --  Specimen Source .Blood Blood-Peripheral  Culture-Blood --

## 2018-02-26 NOTE — CONSULT NOTE ADULT - SUBJECTIVE AND OBJECTIVE BOX
Patient is a 77y old  Male who presents with a chief complaint of Fall and left leg and chest wall pain      HPI:  Patient is 77-year-old he has a history of hypertension, high cholesterol, coronary artery disease, chronic left bundle branch block, history of bladder cancer status post left nephrectomy in the past, carotid artery disease he has nonobstructive carotid artery disease by MRA of the neck in April 2017 he has  50-60% left ICA stenosis, aortic abdominal aneurysm and he status post endovascular repair in January 2018, his  postoperative course was complicated by development of congestive heart failure and his echocardiogram has revealed left ventricular ejection fraction of approximately 25%, he status post cardiac catheterization and he status post stent placement to proximal LAD, diagonal 2 and 1 circumflex. He had  drug-eluting stent placed. He also now has end-stage renal disease and requires hemodialysis. He was also treated for pneumonia & paroxysmal atrial fibrillation. He also has occluded right renal artery but stent could not be inserted. Patient was admitted after he tripped and fell.  states he was walking  with a walker and he tripped and fell. He denies having episode of palpitation or dizziness. He denies any loss of consciousness or headache. He denies any chest pain or shortness of breath. He is alert and oriented and is a poor historian. He is comfortable and afebrile. He is in normal sinus rhythm on telemetry. He states he has been noncompliant with his diet and medication. Above information of pain from the current chart and patient's daughter.         CT Chest No Cont (02.25.18   CT THORAX      IMPRESSION:     CT CHEST: Left greater than right pleural effusions with interstitial   edema due to mild to moderate CHF. Small pericardial effusion.    Patchy bilateral tree-in-bud airspace opacity likely reflects   superimposed multilobar pneumonia. Follow up to resolution.    CT ABDOMEN/PELVIS: No solid organ or bowel injury nor skeletal trauma in   the abdomen and pelvis.    Aortobiiliac stent graft repair. Please note that graft integrity and   vascular structures cannot be evaluated on noncontrast study.        PAST MEDICAL & SURGICAL HISTORY:  CAD (coronary artery disease)  History of heart artery stent: DIONICIO  Presence of stent in LAD coronary artery  History of nephrectomy: Left  S/P AAA repair          MEDICATIONS  (STANDING):  aspirin  chewable 81 milliGRAM(s) Oral daily  atorvastatin 80 milliGRAM(s) Oral at bedtime  cefepime  IVPB 1000 milliGRAM(s) IV Intermittent every 12 hours  clopidogrel Tablet 75 milliGRAM(s) Oral daily  latanoprost 0.005% Ophthalmic Solution 1 Drop(s) Both EYES at bedtime  lidocaine   Patch 1 Patch Transdermal daily  lidocaine   Patch 1 Patch Transdermal daily  lisinopril 5 milliGRAM(s) Oral daily  melatonin 3 milliGRAM(s) Oral at bedtime  metoprolol succinate  milliGRAM(s) Oral daily  sevelamer hydrochloride 800 milliGRAM(s) Oral three times a day with meals    MEDICATIONS  (PRN):  acetaminophen   Tablet. 650 milliGRAM(s) Oral every 6 hours PRN Mild Pain (1 - 3)      FAMILY HISTORY:  Family history of early CAD (Father, Mother)      SOCIAL HISTORY: He denies any h/o smoking or alcohol consumption.    REVIEW OF SYSTEM:  Pertinent items are noted in HPI.  Constitutional	Negative for chills, fevers, sweats.    Eyes: 	Negative for visual disturbance.  Ears, nose, mouth, throat, and face: Negative for epistaxis, nasal congestion, sore throat .  Neck:	Negative for enlargement, pain and difficulty in swallowing  Respiration : Negative for cough, , pleuritic chest pain and wheezing  Cardiovascular: Negative for chest pain,  and palpitations    Gastrointestinal : Negative for abdominal pain, diarrhea, nausea and vomiting  Genitourinary: Negative for dysuria, frequency and urinary incontinence .  Skin: Negative for  rash, pruritus, swelling, dryness .  	  Hematologic/lymphatic: Negative for bleeding and easy bruising  Musculoskeletal: Negative for arthralgias, back pain and muscle weakness.  Neurological: Negative for dizziness, headaches, seizures and tremors  Behavioral/Psych: Negative for mood change, depression.  Endocrine:	Negative for blurry vision, polydipsia and polyuria, diaphoresis.   Allergic/Immunologic:	Negative for anaphylaxis, angioedema and urticaria.      Vital Signs Last 24 Hrs  T(C): 36.8 (26 Feb 2018 05:57), Max: 37.2 (25 Feb 2018 12:27)  T(F): 98.2 (26 Feb 2018 05:57), Max: 99 (25 Feb 2018 12:27)  HR: 88 (26 Feb 2018 05:57) (70 - 108)  BP: 114/62 (26 Feb 2018 05:57) (105/53 - 121/64)  BP(mean): --  RR: 17 (26 Feb 2018 05:57) (16 - 17)  SpO2: 95% (26 Feb 2018 05:57) (90% - 95%)    I&O's Summary    PHYSICAL EXAM  General Appearance: cooperative, no acute distress,   HEENT: PERRL, conjunctiva clear, EOM's intact, non injected pharynx, no exudate .   Neck: Supple, , no adenopathy, thyroid: not enlarged, no carotid bruit or JVD  Back: Symmetric, no  tenderness,no soft tissue tenderness  Lungs: Clear to auscultation bilateral,no adventitious breath sounds, normal   expiratory phase  Heart: Regular rate and rhythm, S1, S2 normal, no murmur, rub or gallop  Abdomen: Soft, non-tender, bowel sounds active , no hepatosplenomegaly  Extremities: no cyanosis or edema, no joint swelling  Skin: Skin color, texture normal, no rashes   Neurologic: Alert and oriented X3 , cranial nerves intact, sensory and motor normal,        INTERPRETATION OF TELEMETRY: NSR     ECG: NSR LBBB        LABS:                          8.7    8.9   )-----------( 301      ( 26 Feb 2018 06:47 )             26.8     02-26    133<L>  |  98  |  63<H>  ----------------------------<  87  4.2   |  24  |  6.68<H>    Ca    8.6      26 Feb 2018 06:47  Phos  3.5     02-26    TPro  7.1  /  Alb  2.2<L>  /  TBili  0.5  /  DBili  x   /  AST  29  /  ALT  28  /  AlkPhos  71  02-26            PT/INR - ( 26 Feb 2018 06:47 )   PT: 45.0 sec;   INR: 4.05 ratio         PTT - ( 25 Feb 2018 05:23 )  PTT:39.3 sec

## 2018-02-26 NOTE — CONSULT NOTE ADULT - ASSESSMENT
1) Acute Decompensated Heart Failure  2) Abnormal CT Chest  3) Pulmonary Edema  4) Bilateral Pleural Effusions  5) ESRD  6) Dyspnea    76 y/o M hx AAA 5.5 cm s/p EVAAR repair with stents 0n 1/29/18 [facility] with incidental finding of Right renal neoplasm, s/p Left Nephrectomy for renal cancer 2008, Bladder Cancer, LBBB, admitted on Feb. 4, 2018 for ADHF due to Acute Systolic CHF-Echo 2/18/18 EF ~25%,  Discussed findings of CT chest/pleural effusions with family  Etiology is likely secondary to heart failure/volume overload from ESRD  Agree with Cardiology/Nephrology recommendations  As far as the pneumonia process, likely pulmonary edema representing these findings over a consolidation  Follow up Blood cultures, sputum cultulres  HD per Renal  Goal SaO2 >88%  PT/OT  Thank you for the consult  Will continue to monitor

## 2018-02-26 NOTE — CONSULT NOTE ADULT - SUBJECTIVE AND OBJECTIVE BOX
Patient is a 77y old  Male who presents with a chief complaint of Fall and left leg and chest wall pain (25 Feb 2018 09:47)      HPI:  76 y/o M hx AAA 5.5 cm s/p EVAAR repair with stents 0n 1/29/18 [facility] with incidental finding of Right renal neoplasm, s/p Left Nephrectomy for renal cancer 2008, Bladder Cancer, LBBB, admitted on Feb. 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 presents from [facility] after fall. Pt reports he was walking from bed to close his door around 3:00 AM and did not use his walker and fell. He denies hitting head or LOC. Only c/o pain to Left chest wall. Denies CP/SOB, light headedness. Reports he only wants XR and refusing blood work on arrival.     Patient was seen with daughter at bedside and sister. Patient denies nausea, vomiting, bleeding or dysuria. Patient has been making some urine now. No diarrhea or abdominal pain or headache. Some cough. (25 Feb 2018 09:47)      PAST MEDICAL & SURGICAL HISTORY:  CAD (coronary artery disease)  History of heart artery stent: DIONICIO  Presence of stent in LAD coronary artery  History of nephrectomy: Left  S/P AAA repair      PREVIOUS DIAGNOSTIC TESTING:      MEDICATIONS  (STANDING):  aspirin  chewable 81 milliGRAM(s) Oral daily  atorvastatin 80 milliGRAM(s) Oral at bedtime  cefepime  IVPB 1000 milliGRAM(s) IV Intermittent every 12 hours  clopidogrel Tablet 75 milliGRAM(s) Oral daily  latanoprost 0.005% Ophthalmic Solution 1 Drop(s) Both EYES at bedtime  lidocaine   Patch 1 Patch Transdermal daily  lidocaine   Patch 1 Patch Transdermal daily  lisinopril 5 milliGRAM(s) Oral daily  melatonin 3 milliGRAM(s) Oral at bedtime  metoprolol succinate  milliGRAM(s) Oral daily  sevelamer hydrochloride 800 milliGRAM(s) Oral three times a day with meals    MEDICATIONS  (PRN):  acetaminophen   Tablet. 650 milliGRAM(s) Oral every 6 hours PRN Mild Pain (1 - 3)      FAMILY HISTORY:  Family history of early CAD (Father, Mother)      SOCIAL HISTORY:  ***    REVIEW OF SYSTEM:  Dyspnea, fatigue, otherwise 12 point ROS Negative     Vital Signs Last 24 Hrs  T(C): 36.8 (26 Feb 2018 05:57), Max: 37.2 (25 Feb 2018 12:27)  T(F): 98.2 (26 Feb 2018 05:57), Max: 99 (25 Feb 2018 12:27)  HR: 88 (26 Feb 2018 05:57) (70 - 108)  BP: 114/62 (26 Feb 2018 05:57) (105/53 - 121/64)  BP(mean): --  RR: 17 (26 Feb 2018 05:57) (16 - 17)  SpO2: 95% (26 Feb 2018 05:57) (90% - 95%)    I&O's Summary    PHYSICAL EXAM  General Appearance: cooperative, no acute distress,   HEENT: PERRL, conjunctiva clear, EOM's intact, non injected pharynx, no exudate, TM   normal  Neck: Supple, , no adenopathy, thyroid: not enlarged, no carotid bruit or JVD  Back: Symmetric, no  tenderness,no soft tissue tenderness  Lungs: Clear to auscultation bilateral,no adventitious breath sounds, normal   expiratory phase  Heart: Regular rate and rhythm, S1, S2 normal, no murmur, rub or gallop  Abdomen: Soft, non-tender, bowel sounds active , no hepatosplenomegaly  Extremities: no cyanosis or edema, no joint swelling  Skin: Skin color, texture normal, no rashes   Neurologic: Alert and oriented X3 , cranial nerves intact, sensory and motor normal,    ECG:    LABS:                          8.7    8.9   )-----------( 301      ( 26 Feb 2018 06:47 )             26.8     02-26    133<L>  |  98  |  63<H>  ----------------------------<  87  4.2   |  24  |  6.68<H>    Ca    8.6      26 Feb 2018 06:47  Phos  3.5     02-26    TPro  7.1  /  Alb  2.2<L>  /  TBili  0.5  /  DBili  x   /  AST  29  /  ALT  28  /  AlkPhos  71  02-26            PT/INR - ( 26 Feb 2018 06:47 )   PT: 45.0 sec;   INR: 4.05 ratio         PTT - ( 25 Feb 2018 05:23 )  PTT:39.3 sec          RADIOLOGY & ADDITIONAL STUDIES:

## 2018-02-26 NOTE — PROGRESS NOTE ADULT - ASSESSMENT
77 with recent EVAAR on 1/29/18 [facility], hx of Left Nephrectomy for renal cancer 2008, Bladder Cancer, re admitted on Feb. 4, 2018 for Acute Systolic CHF requiring HD in addition to Bipap, Afib on coumadin, HTN, HLD, CAD occlusion of Right renal artery presents from [facility] after fall.     ESRD  -PUF today to optimize volume (imaging on admit with chf/effusions)  -HD T/T/S, treatment in AM  -K protocol  -UF as tolerated  -Eventual AVF with [facility]?, they had refused prior to d/c from [facility]    Fall  -Medicine workup ongoing  -On tele  -PT    Renal mass  -Was known from last hospital stay with [facility], will need plan with discharge  -IF remains ESRD with lost renal artery, nephrectomy would be only reasonable option    d/c with family  d/c with staff    Addendum: 1600  Hgb reported in Pm at 7.6  Case d/c with Dr. Perez regarding anemia and she will follow up

## 2018-02-26 NOTE — CONSULT NOTE ADULT - ASSESSMENT
76 y/o male with h/o AAA 5.5 cm s/p EVAAR repair with stents 0n 1/29/18 [facility], Right renal neoplasm, s/p Left Nephrectomy for renal cancer 2008, Bladder Cancer, LBBB, s/p Permacath right subclavian for HD due to ESRD, Afib on coumadin, HTN, HLD, CAD s/p 3 stents for Proximal LAD 95%, RAJEEV was admitted on 2/25/18 for left chest wall pain and left leg pain s/p fell the night PTA. Pt states he fell when he was walking without his walker to close his door. Denies head trauma or LOC. Pt reports productive cough x 3 weeks PTA. Pt denies fever, chills, nausea, vomiting, SOB, chest pain, abd pain.     1. B/L patchy opacity possibly multilobular PNA. ESRD on HD.   -will treat as HCAP for now   -Leukocytosis resolving   -obtain BC x2   -agree with cefepime 1g IV q 24h, will add doxycycline 100mg PO BID   -reason for abx use and side effects reviewed with patient; monitor BMP   -monitor BMP, vitals    -f/u CBC, sputum c/s   -supportive care    2. Other issues:   -care per medicine 78 y/o male with h/o AAA s/p surgical repair with stents on 1/29/18 [facility], recently diagnosed right renal neoplasm, prior renal Ca s/p left Nephrectomy in 2008, bladder cancer, LBBB, Permacath right subclavian for HD due to ESRD, A.fib on coumadin, HTN, HLD, CAD s/p 3 stents, HIT was admitted on 2/25/18 for left chest wall pain and left leg pain s/p fell the night PTA. Pt stated he fell when he was walking without his walker to close his door. Denies head trauma or LOC. Pt reports productive cough x 3 weeks PTA. Pt denies fever, chills, nausea, vomiting, SOB, chest pain, abd pain. In the ED, pt was found to have patchy b/o airspace opacity and was given Vancomycin and Cefepime.     1. B/L patchy opacity possibly multilobular PNA. ESRD on HD.   -will treat as HCAP for now   -Leukocytosis resolving   -obtain BC x2   -agree with cefepime 1g IV q 24h, will add doxycycline 100mg PO BID   -reason for abx use and side effects reviewed with patient; monitor BMP   -monitor BMP, vitals    -f/u CBC, sputum c/s   -supportive care    2. Other issues:   -care per medicine 76 y/o male with h/o AAA s/p surgical repair with stents on 1/29/18 [facility], recently diagnosed right renal neoplasm, prior renal Ca s/p left Nephrectomy in 2008, bladder cancer, LBBB, Permacath right subclavian for HD due to ESRD, A.fib on coumadin, HTN, HLD, CAD s/p 3 stents, HIT was admitted on 2/25/18 for left chest wall pain and left leg pain s/p fell the night PTA. Pt stated he fell when he was walking without his walker to close his door. Denies head trauma or LOC. Pt reports productive cough x 3 weeks PTA. Pt denies fever, chills, nausea, vomiting, SOB, chest pain, abd pain. In the ED, pt was found to have patchy b/o airspace opacity and was given Vancomycin and Cefepime.     1. Dyspnea. Likely CHF exacerbation. ESRD on HD.   -b/l patchy opacities noted on CT chest - likely related to pulmonary congestion  -on cefepime 1 gm IV qd  -tolerating abx well so far; no side effects noted  -will hold off further abx coverage for now  -pulmonary evaluation appreciated  -monitor BMP, vitals    -f/u CBC   -supportive care    2. Other issues:   -care per medicine

## 2018-02-27 DIAGNOSIS — I50.9 HEART FAILURE, UNSPECIFIED: ICD-10-CM

## 2018-02-27 DIAGNOSIS — F06.8 OTHER SPECIFIED MENTAL DISORDERS DUE TO KNOWN PHYSIOLOGICAL CONDITION: ICD-10-CM

## 2018-02-27 LAB
ALBUMIN SERPL ELPH-MCNC: 2.1 G/DL — LOW (ref 3.3–5)
ALP SERPL-CCNC: 66 U/L — SIGNIFICANT CHANGE UP (ref 40–120)
ALT FLD-CCNC: 22 U/L — SIGNIFICANT CHANGE UP (ref 12–78)
ANION GAP SERPL CALC-SCNC: 15 MMOL/L — SIGNIFICANT CHANGE UP (ref 5–17)
AST SERPL-CCNC: 25 U/L — SIGNIFICANT CHANGE UP (ref 15–37)
BILIRUB SERPL-MCNC: 0.4 MG/DL — SIGNIFICANT CHANGE UP (ref 0.2–1.2)
BUN SERPL-MCNC: 79 MG/DL — HIGH (ref 7–23)
CALCIUM SERPL-MCNC: 8.4 MG/DL — LOW (ref 8.5–10.1)
CHLORIDE SERPL-SCNC: 99 MMOL/L — SIGNIFICANT CHANGE UP (ref 96–108)
CO2 SERPL-SCNC: 20 MMOL/L — LOW (ref 22–31)
CREAT SERPL-MCNC: 8.26 MG/DL — HIGH (ref 0.5–1.3)
GLUCOSE SERPL-MCNC: 97 MG/DL — SIGNIFICANT CHANGE UP (ref 70–99)
HCT VFR BLD CALC: 24.4 % — LOW (ref 39–50)
HGB BLD-MCNC: 7.9 G/DL — LOW (ref 13–17)
IRON SATN MFR SERPL: 19 UG/DL — LOW (ref 45–165)
MCHC RBC-ENTMCNC: 29.4 PG — SIGNIFICANT CHANGE UP (ref 27–34)
MCHC RBC-ENTMCNC: 32.3 GM/DL — SIGNIFICANT CHANGE UP (ref 32–36)
MCV RBC AUTO: 91.2 FL — SIGNIFICANT CHANGE UP (ref 80–100)
PLATELET # BLD AUTO: 299 K/UL — SIGNIFICANT CHANGE UP (ref 150–400)
POTASSIUM SERPL-MCNC: 4.1 MMOL/L — SIGNIFICANT CHANGE UP (ref 3.5–5.3)
POTASSIUM SERPL-SCNC: 4.1 MMOL/L — SIGNIFICANT CHANGE UP (ref 3.5–5.3)
PROT SERPL-MCNC: 6.5 GM/DL — SIGNIFICANT CHANGE UP (ref 6–8.3)
RBC # BLD: 2.68 M/UL — LOW (ref 4.2–5.8)
RBC # FLD: 14.3 % — SIGNIFICANT CHANGE UP (ref 10.3–14.5)
SODIUM SERPL-SCNC: 134 MMOL/L — LOW (ref 135–145)
WBC # BLD: 8.8 K/UL — SIGNIFICANT CHANGE UP (ref 3.8–10.5)
WBC # FLD AUTO: 8.8 K/UL — SIGNIFICANT CHANGE UP (ref 3.8–10.5)

## 2018-02-27 RX ORDER — METOPROLOL TARTRATE 50 MG
5 TABLET ORAL ONCE
Qty: 0 | Refills: 0 | Status: COMPLETED | OUTPATIENT
Start: 2018-02-27 | End: 2018-02-27

## 2018-02-27 RX ORDER — DIAZEPAM 5 MG
2.5 TABLET ORAL ONCE
Qty: 0 | Refills: 0 | Status: DISCONTINUED | OUTPATIENT
Start: 2018-02-27 | End: 2018-02-27

## 2018-02-27 RX ORDER — METOPROLOL TARTRATE 50 MG
5 TABLET ORAL ONCE
Qty: 0 | Refills: 0 | Status: DISCONTINUED | OUTPATIENT
Start: 2018-02-27 | End: 2018-02-27

## 2018-02-27 RX ORDER — AMIODARONE HYDROCHLORIDE 400 MG/1
400 TABLET ORAL EVERY 8 HOURS
Qty: 0 | Refills: 0 | Status: DISCONTINUED | OUTPATIENT
Start: 2018-02-27 | End: 2018-03-01

## 2018-02-27 RX ORDER — METOPROLOL TARTRATE 50 MG
50 TABLET ORAL EVERY 12 HOURS
Qty: 0 | Refills: 0 | Status: DISCONTINUED | OUTPATIENT
Start: 2018-02-27 | End: 2018-03-03

## 2018-02-27 RX ADMIN — Medication 81 MILLIGRAM(S): at 12:25

## 2018-02-27 RX ADMIN — AMIODARONE HYDROCHLORIDE 400 MILLIGRAM(S): 400 TABLET ORAL at 10:47

## 2018-02-27 RX ADMIN — LIDOCAINE 1 PATCH: 4 CREAM TOPICAL at 12:26

## 2018-02-27 RX ADMIN — CLOPIDOGREL BISULFATE 75 MILLIGRAM(S): 75 TABLET, FILM COATED ORAL at 12:25

## 2018-02-27 RX ADMIN — SEVELAMER CARBONATE 800 MILLIGRAM(S): 2400 POWDER, FOR SUSPENSION ORAL at 12:27

## 2018-02-27 RX ADMIN — Medication 5 MILLIGRAM(S): at 06:27

## 2018-02-27 RX ADMIN — AMIODARONE HYDROCHLORIDE 400 MILLIGRAM(S): 400 TABLET ORAL at 20:58

## 2018-02-27 RX ADMIN — Medication 100 MILLIGRAM(S): at 06:01

## 2018-02-27 RX ADMIN — Medication 2.5 MILLIGRAM(S): at 20:58

## 2018-02-27 RX ADMIN — LATANOPROST 1 DROP(S): 0.05 SOLUTION/ DROPS OPHTHALMIC; TOPICAL at 21:17

## 2018-02-27 RX ADMIN — LISINOPRIL 5 MILLIGRAM(S): 2.5 TABLET ORAL at 06:01

## 2018-02-27 RX ADMIN — Medication 650 MILLIGRAM(S): at 21:18

## 2018-02-27 RX ADMIN — ATORVASTATIN CALCIUM 80 MILLIGRAM(S): 80 TABLET, FILM COATED ORAL at 21:17

## 2018-02-27 RX ADMIN — Medication 650 MILLIGRAM(S): at 22:42

## 2018-02-27 RX ADMIN — Medication 3 MILLIGRAM(S): at 21:17

## 2018-02-27 RX ADMIN — Medication 50 MILLIGRAM(S): at 20:57

## 2018-02-27 RX ADMIN — LIDOCAINE 1 PATCH: 4 CREAM TOPICAL at 22:41

## 2018-02-27 RX ADMIN — Medication 2.5 MILLIGRAM(S): at 15:55

## 2018-02-27 RX ADMIN — LIDOCAINE 1 PATCH: 4 CREAM TOPICAL at 22:42

## 2018-02-27 NOTE — PROGRESS NOTE ADULT - ATTENDING COMMENTS
pt seen and examined. agree with above as documented by NP with appropriate edits as made.   total time 35 minutes, including coordination of care and discussion with care team.

## 2018-02-27 NOTE — PROGRESS NOTE ADULT - SUBJECTIVE AND OBJECTIVE BOX
HOSPITAL COURSE AND ASSESSMENT  78 y/o M hx AAA 5.5 cm s/p EVAAR repair with stents recently discharged on 1/29/18 [facility] with incidental finding of Right renal neoplasm, s/p Left Nephrectomy for renal cancer 2008, Bladder Cancer, LBBB, admitted on Feb. 4, 2018 for ADHF due to Acute Systolic CHF-Echo 2/18/18 EF ~25%, and Elevated Troponin requiring HD in addition to BIPAP s/p Permacath right subclavian for HD due to ESRD on HD TThS, Afib on coumadin, HTN, HLD, CAD s/p 3 stents for Proximal LAD 95%, D2 90% and OM1 90% RICHARDSON, RAJEEV, also treated for PNA HCAP and Occlusion of Right renal artery but could not be intervened presents from [facility] after fall. Pt reports he was walking from bed to close his door around 3:00 AM and did not use his walker and fell. He denies hitting head or LOC. Only c/o pain to Left chest wall. Denies CP/SOB, light headedness. Reports he only wants XR and refused blood work on arrival. Patient is a poor historian, agitated and angry, cursing at spouse.        Pt was admitted to telemetry for treatment of acute on chronic CHF exacerbation. Multiple consultants involved in care with ID, Pulm, Cards, Renal all following in house.    Record review from [facility]       CHIEF COMPLAINT: fall    SUBJECTIVE:     2/26 tolerating PO. OOB. cantakerous. refuses much discussion. wife at bedside with multiple concerns  2/27: cooperative, open to discussion regarding plan of care, wife and son at bedside inquiring appropriately regarding plan of care;  denies feelings of chest pain, no HA/dizziness/SOB/abd pain/n/v/d/f/c at rest    REVIEW OF SYSTEMS:  All other review of systems is negative unless indicated above    Vital Signs Last 24 Hrs  T(C): 36.6 (27 Feb 2018 08:30), Max: 36.9 (27 Feb 2018 01:00)  T(F): 97.8 (27 Feb 2018 08:30), Max: 98.5 (27 Feb 2018 01:00)  HR: 122 (27 Feb 2018 09:10) (86 - 142)  BP: 114/58 (27 Feb 2018 09:10) (98/51 - 114/58)  BP(mean): 70 (27 Feb 2018 06:06) (70 - 70)  RR: 16 (27 Feb 2018 09:10) (16 - 17)  SpO2: 96% (27 Feb 2018 06:06) (94% - 96%)        PHYSICAL EXAM:  Constitutional: NAD  HEENT: NC/AT EOMI, Normal Hearing, MMM, fair dentition  Neck: trachea midline, No JVD  Respiratory: Breath sounds scattered crackles Left, otherwise good air entry; unlabored respiration  Cardiovascular: S1 and S2, regular rate   Gastrointestinal: Bowel Sounds present, soft, nontender, nondistended  Extremities: peripheral edema  Vascular: 2+ radial pulse  Neurological: awake, alert, follows commands, sensation grossly intact  Psych: appropriate affect,  insight  Musculoskeletal: moves all extremities  Skin: No rashes  Other: L ACW permacath intact, site w/o bleeding/hematoma    ALLERGIES  heparin (Other)      MEDICATIONS  (STANDING):  amiodarone    Tablet 400 milliGRAM(s) Oral every 8 hours  aspirin  chewable 81 milliGRAM(s) Oral daily  atorvastatin 80 milliGRAM(s) Oral at bedtime  clopidogrel Tablet 75 milliGRAM(s) Oral daily  latanoprost 0.005% Ophthalmic Solution 1 Drop(s) Both EYES at bedtime  lidocaine   Patch 1 Patch Transdermal daily  lidocaine   Patch 1 Patch Transdermal daily  lisinopril 5 milliGRAM(s) Oral daily  melatonin 3 milliGRAM(s) Oral at bedtime  metoprolol     tartrate 50 milliGRAM(s) Oral every 12 hours  sevelamer hydrochloride 800 milliGRAM(s) Oral three times a day with meals    MEDICATIONS  (PRN):  acetaminophen   Tablet. 650 milliGRAM(s) Oral every 6 hours PRN Mild Pain (1 - 3)      LABS: All Labs Reviewed:                        7.9    8.8   )-----------( 299      ( 27 Feb 2018 07:03 )             24.4     02-27    134<L>  |  99  |  79<H>  ----------------------------<  97  4.1   |  20<L>  |  8.26<H>    Ca    8.4<L>      27 Feb 2018 07:03  Phos  3.5     02-26    TPro  6.5  /  Alb  2.1<L>  /  TBili  0.4  /  DBili  x   /  AST  25  /  ALT  22  /  AlkPhos  66  02-27    LIVER FUNCTIONS - ( 27 Feb 2018 07:03 )  Alb: 2.1 g/dL / Pro: 6.5 gm/dL / ALK PHOS: 66 U/L / ALT: 22 U/L / AST: 25 U/L / GGT: x           PT/INR - ( 26 Feb 2018 06:47 )   PT: 45.0 sec;   INR: 4.05 ratio      Blood Culture: 02-25 @ 06:37  Organism --  Gram Stain Blood -- Gram Stain --  Specimen Source .Blood Blood-Peripheral  Culture-Blood --

## 2018-02-27 NOTE — PROGRESS NOTE ADULT - ASSESSMENT
1) Acute Decompensated Heart Failure  2) Abnormal CT Chest  3) Pulmonary Edema  4) Bilateral Pleural Effusions  5) ESRD  6) Dyspnea    2/27/18    78 y/o M hx AAA 5.5 cm s/p EVAAR repair with stents 0n 1/29/18 [facility] with incidental finding of Right renal neoplasm, s/p Left Nephrectomy for renal cancer 2008, Bladder Cancer, LBBB, admitted on Feb. 4, 2018 for ADHF due to Acute Systolic CHF-Echo 2/18/18 EF ~25%,  Discussed findings of CT chest/pleural effusions with family  Etiology is likely secondary to heart failure/volume overload from ESRD  Agree with Cardiology/Nephrology recommendations  ABX d/c 2/26  Blood cultures NGTD  HD per Renal, today, 2/27  Goal SaO2 >88%  PT/OT  Thank you for the consult  Will continue to monitor

## 2018-02-27 NOTE — BEHAVIORAL HEALTH ASSESSMENT NOTE - NSBHCHARTREVIEWLAB_PSY_A_CORE FT
02-27    134<L>  |  99  |  79<H>  ----------------------------<  97  4.1   |  20<L>  |  8.26<H>    Ca    8.4<L>      27 Feb 2018 07:03  Phos  3.5     02-26    TPro  6.5  /  Alb  2.1<L>  /  TBili  0.4  /  DBili  x   /  AST  25  /  ALT  22  /  AlkPhos  66  02-27                        7.9    8.8   )-----------( 299      ( 27 Feb 2018 07:03 )             24.4

## 2018-02-27 NOTE — PROGRESS NOTE ADULT - SUBJECTIVE AND OBJECTIVE BOX
Patient is a 77y old  Male who presents with a chief complaint of Fall and left leg and chest wall pain (25 Feb 2018 09:47)      HPI:  78 y/o M hx AAA 5.5 cm s/p EVAAR repair with stents 0n 1/29/18 [facility] with incidental finding of Right renal neoplasm, s/p Left Nephrectomy for renal cancer 2008, Bladder Cancer, LBBB, admitted on Feb. 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 presents from [facility] after fall. Pt reports he was walking from bed to close his door around 3:00 AM and did not use his walker and fell. He denies hitting head or LOC. Only c/o pain to Left chest wall. Denies CP/SOB, light headedness. Reports he only wants XR and refusing blood work on arrival.   Patient was seen with daughter at bedside and sister. Patient denies nausea, vomiting, bleeding or dysuria. Patient has been making some urine now. No diarrhea or abdominal pain or headache. Some cough. (25 Feb 2018 09:47)    2/27/18  Patient was found to have AF w RVR overnight  Echocardiogram performed revealed moderate pericardial effusion    PAST MEDICAL & SURGICAL HISTORY:  CAD (coronary artery disease)  History of heart artery stent: DIONICIO  Presence of stent in LAD coronary artery  History of nephrectomy: Left  S/P AAA repair      PREVIOUS DIAGNOSTIC TESTING:      MEDICATIONS  (STANDING):  aspirin  chewable 81 milliGRAM(s) Oral daily  atorvastatin 80 milliGRAM(s) Oral at bedtime  clopidogrel Tablet 75 milliGRAM(s) Oral daily  latanoprost 0.005% Ophthalmic Solution 1 Drop(s) Both EYES at bedtime  lidocaine   Patch 1 Patch Transdermal daily  lidocaine   Patch 1 Patch Transdermal daily  lisinopril 5 milliGRAM(s) Oral daily  melatonin 3 milliGRAM(s) Oral at bedtime  metoprolol     tartrate 50 milliGRAM(s) Oral every 12 hours  sevelamer hydrochloride 800 milliGRAM(s) Oral three times a day with meals    MEDICATIONS  (PRN):  acetaminophen   Tablet. 650 milliGRAM(s) Oral every 6 hours PRN Mild Pain (1 - 3)        FAMILY HISTORY:  Family history of early CAD (Father, Mother)      SOCIAL HISTORY:  ***    REVIEW OF SYSTEM:  Dyspnea, fatigue, otherwise 12 point ROS Negative     Vital Signs Last 24 Hrs  T(C): 36.6 (27 Feb 2018 08:30), Max: 36.9 (27 Feb 2018 01:00)  T(F): 97.8 (27 Feb 2018 08:30), Max: 98.5 (27 Feb 2018 01:00)  HR: 86 (27 Feb 2018 08:30) (81 - 134)  BP: 98/51 (27 Feb 2018 08:30) (77/41 - 113/61)  BP(mean): 70 (27 Feb 2018 06:06) (70 - 70)  RR: 17 (27 Feb 2018 08:30) (16 - 18)  SpO2: 96% (27 Feb 2018 06:06) (94% - 96%)    I&O's Summary    PHYSICAL EXAM  General Appearance: cooperative, no acute distress,   HEENT: PERRL, conjunctiva clear, EOM's intact, non injected pharynx, no exudate, TM   normal  Neck: Supple, , no adenopathy, thyroid: not enlarged, no carotid bruit or JVD  Back: Symmetric, no  tenderness,no soft tissue tenderness  Lungs: Clear to auscultation bilateral,no adventitious breath sounds, normal   expiratory phase  Heart: Regular rate and rhythm, S1, S2 normal, no murmur, rub or gallop  Abdomen: Soft, non-tender, bowel sounds active , no hepatosplenomegaly  Extremities: no cyanosis or edema, no joint swelling  Skin: Skin color, texture normal, no rashes   Neurologic: Alert and oriented X3 , cranial nerves intact, sensory and motor normal,    ECG:    LABS:                          8.7    8.9   )-----------( 301      ( 26 Feb 2018 06:47 )             26.8     02-26    133<L>  |  98  |  63<H>  ----------------------------<  87  4.2   |  24  |  6.68<H>    Ca    8.6      26 Feb 2018 06:47  Phos  3.5     02-26    TPro  7.1  /  Alb  2.2<L>  /  TBili  0.5  /  DBili  x   /  AST  29  /  ALT  28  /  AlkPhos  71  02-26            PT/INR - ( 26 Feb 2018 06:47 )   PT: 45.0 sec;   INR: 4.05 ratio         PTT - ( 25 Feb 2018 05:23 )  PTT:39.3 sec          RADIOLOGY & ADDITIONAL STUDIES:

## 2018-02-27 NOTE — PROGRESS NOTE ADULT - SUBJECTIVE AND OBJECTIVE BOX
Patient is a 77y old  Male who presents with a chief complaint of Fall and left leg and chest wall pain (2018 09:47)    HPI:  76 y/o M hx AAA 5.5 cm s/p EVAAR repair with stents 0n 18 [facility] with incidental finding of Right renal neoplasm, s/p Left Nephrectomy for renal cancer , Bladder Cancer, LBBB, admitted on 2018 for ADHF due to Acute Systolic CHF-Echo 18 EF ~25%, and Elevated Troponin requiring HD in addition to Bipap, s/p Permacath right subclavian for HD due to ESRD on HD TThS, Afib on coumadin, HTN, HLD, CAD s/p 3 stents for Proximal LAD 95%, D2 90% and OM1 90% RICHARDSON, RAJEEV, also treated for PNA HCAP and Occlusion of Right renal artery but could not be intervened presents from [facility] after fall. Pt reports he was walking from bed to close his door around 3:00 AM and did not use his walker and fell. He denies hitting head or LOC. Only c/o pain to Left chest wall. Denies CP/SOB, light headedness. Reports he only wants XR and refusing blood work on arrival.     Patient was seen with daughter at bedside and sister. Patient denies nausea, vomiting, bleeding or dysuria. Patient has been making some urine now. No diarrhea or abdominal pain or headache. Some cough. (2018 09:47)    18-  pt a & o x 2-- intermittently oriented but anxious, keeps repeating "why am I here?"  Reoriented by myself and sister at bedside.  Pt very anxious at baseline as per sister and has had intermittent x 2 weeks as per sister.  denies any chest pain/sob at this time.  He is awaiting dialysis, currently in NSR.  Spoke with Janett ALVA concerning patient's anxiety, one time dose of valium ordered.      HEALTH ISSUES - PROBLEM Dx:  Ischemic cardiomyopathy: Ischemic cardiomyopathy  Fall, initial encounter: Fall, initial encounter  Anemia in stage 5 chronic kidney disease, not on chronic dialysis: Anemia in stage 5 chronic kidney disease, not on chronic dialysis  CAD in native artery: CAD in native artery  Renal neoplasm: Renal neoplasm  Coagulopathy: Coagulopathy  PAF (paroxysmal atrial fibrillation): PAF (paroxysmal atrial fibrillation)  ESRD on hemodialysis: ESRD on hemodialysis  Pleural effusion, bilateral: Pleural effusion, bilateral  HCAP (healthcare-associated pneumonia): HCAP (healthcare-associated pneumonia)          Daily     Daily Weight in k.8 (2018 09:10)    T(C): 36.7 (18 @ 15:28), Max: 36.9 (18 @ 01:00)  HR: 122 (18 @ 09:10) (86 - 142)  BP: 114/58 (18 @ 09:10) (98/51 - 114/58)  RR: 16 (18 @ 09:10) (16 - 17)  SpO2: 96% (18 @ 06:06) (94% - 96%)    Home Medications:  aspirin 81 mg oral tablet: 1 tab(s) orally once a day (2018 08:59)  latanoprost 0.005% ophthalmic solution: 1 drop(s) in each eye once a day (in the evening) (2018 08:59)  Lipitor 80 mg oral tablet: 1 tab(s) orally once a day (at bedtime) (2018 08:59)  lisinopril 5 mg oral tablet: 1 tab(s) orally once a day (2018 08:59)  melatonin 3 mg oral tablet: 1 tab(s) orally once (at bedtime) (2018 08:59)  Plavix 75 mg oral tablet: 1 tab(s) orally once a day (2018 08:59)  sevelamer carbonate 800 mg oral tablet: 2 tab(s) orally 3 times a day (with meals) (2018 08:59)  Toprol- mg oral tablet, extended release: 1 tab(s) orally once a day (2018 08:59)  warfarin 2 mg oral tablet: 1 tab(s) orally once a day (at bedtime) (2018 08:59)      Transthoracic Echocardiogram:    EXAM:  2D ECHOCARDIOGRAM AD         PROCEDURE DATE:  2018        INTERPRETATION:  Transthoracic Echocardiography Report (TTE)     Demographics     Patient name        LIZZY TRIPATHI Age           77 year(s)     Med Rec #           [id number]         Gender        Male     Account #           [id number]           Date of Birth 1940     Medardo Boyle     Room Number   0351   Physician     Referring Physician Karen Gilbert MD   Sonographer   [place],                                                  [facility]     Date of study       2018 09:43                       AM     Height              70 in             Weight        180.78 pounds    Type of Study:     TTE procedure: 2D echocardiogram     BP: 114/62 mmHg     Study Location: 3ETechnical Quality: Fair    Indications   1) R06.00 - Dyspnea    M-Mode Measurements (cm)     LVEDd: 4.64 cm            LVESd: 3.65 cm   IVSEd: 1.11 cm   LVPWd: 1.01 cm            AO Root Dimension: 3.4 cm                   ACS: 1.5 cm                             LA: 4 cm                             LVOT: 2 cm    Doppler Measurements:     AV Velocity:195 cm/s                 MV Peak E-Wave: 78 cm/s   AV Peak Gradient: 15.21 mmHg         MV Peak A-Wave: 108 cm/s   AV Mean Gradient: 8 mmHg             MV E/A Ratio: 0.72 %   AV Area (Continuity):1.52 cm^2       MV Peak Gradient: 2.43 mmHg   TR Velocity:195 cm/s   TR Gradient:15.21 mmHg   Estimated RAP:5 mmHg   RVSP:20 mmHg     Findings     Mitral Valve   The mitral valve leaflets appear thickened.   Moderate mitral annular calcification is present.   Trace mitral regurgitation is present.   EA reversal of the mitral inflow consistent with reduced compliance of   the   left ventricle.     Aortic Valve   Significant fibrocalcific changes noted to the aortic valve leaflets with   restriction in leaflet excursion. Peak transaortic gradient is 15 mmHg;   this finding is consistent with mild aortic stenosis.   Trace aortic regurgitation is present.     Tricuspid Valve   Normal appearing tricuspid valve structure and function.   Trace tricuspid valve regurgitation is present.     Pulmonic Valve   Normal appearing pulmonic valve structure and function.     Left Atrium   The left atrium is mildly dilated.     Left Ventricle   Endocardium is not always well visualized, however, overall left   ventricular systolic function appears mildly decreased, with mild   hypokinesis of the anterolateral wall. Paradoxical septal motion is also   present. Estimated left ventricular ejection fraction is 45 %.     Right Atrium   Normal appearing right atrium.     Right Ventricle   Normal appearing right ventricle structure and function.     Pericardial Effusion   A mild to moderate sized pericardial effusionis present.     Pleural Effusion   Pleural effusion - is present..     Miscellaneous   IVC is collapsing with inspiration.     Impression     Summary     Endocardium is not always well visualized, however, overall left   ventricular systolic functionappears mildly decreased, with mild   hypokinesis of the anterolateral wall. Paradoxical septal motion is also   present. Estimated left ventricular ejection fraction is 45 %.   Significant fibrocalcific changes noted to the aortic valve leaflets with   restriction in leaflet excursion. Peak transaortic gradient is 15 mmHg;   this finding is consistent with mild aortic stenosis.   Trace aortic regurgitation is present.   The mitral valve leaflets appear thickened.   Moderate mitral annular calcification is present.   Trace mitral regurgitation is present.   EA reversal of the mitral inflow consistent with reduced compliance of   the   left ventricle.   A mild to moderate sized pericardial effusion is present.     Signature     ----------------------------------------------------------------   Electronically signed by Medardo Piedra(Interpreting physician)   on 2018 12:09 PM   ----------------------------------------------------------------    Valves     Mitral Valve     Peak E-Wave: 78 cm/s   Peak A-Wave: 108 cm/s   Peak Gradient: 2.43 mmHg                                                 E/A Ratio: 0.72     Aortic Valve     Peak Velocity: 195 cm/s            Area (continuity): 1.52 cm^2   Peak Gradient: 15.21 mmHg          Mean Gradient: 8 mmHg                                      AV VTI: 34.5 cm   Cusp Separation: 1.5 cm     Tricuspid Valve     TR Velocity: 195 cm/s                Estimated RAP: 5 mmHg   TR Gradient: 15.21 mmHg              Estimated RVSP: 20 mmHg     Pulmonic Valve              Estimated PASP: 20.21 mmHg     LVOT     LVOT Diameter: 2 cm                 Mean Gradient: 1 mmHg                                       LVOT VTI: 16.7 cm    Structures     Left Atrium     LA Dimension: 4 cm         LA Area: 21.4 cm^2   LA/Aorta: 1.18             LA Volume/Index: 76 ml /38m^2     Left Ventricle     Diastolic Dimension: 4.64 cm          Systolic Dimension: 3.65 cm   Septum Diastolic: 1.11 cm   PW Diastolic: 1.01 cm     FS: 21.34 %   LVOT Diameter: 2 cm     Right Atrium     RA Systolic Pressure: 5 mmHg     Right Ventricle              RV Systolic Pressure: 20.21 mmHg     Miscellaneous     Aorta     Aortic Root: 3.4 cm   LVOT Diameter: 2 cm                    MEDARDO PIEDRA M.D., ATTENDING CARDIOLOGIST  This document has been electronically signed. 2018 12:09PM             (18 @ 10:01)

## 2018-02-27 NOTE — PROGRESS NOTE ADULT - SUBJECTIVE AND OBJECTIVE BOX
Chief complaint: 78 y/o male present with c/o left chest wall and leg pain s/p fell the night PTA    HPI:   78 y/o male with h/o AAA s/p surgical repair with stents on 1/29/18 [facility], recently diagnosed right renal neoplasm, prior renal Ca s/p left Nephrectomy in 2008, bladder cancer, LBBB, Permacath right subclavian for HD due to ESRD, A.fib on coumadin, HTN, HLD, CAD s/p 3 stents, HIT was admitted on 2/25/18 for left chest wall pain and left leg pain s/p fell the night PTA. Pt stated he fell when he was walking without his walker to close his door. Denies head trauma or LOC. Pt reports productive cough x 3 weeks PTA. Pt denies fever, chills, nausea, vomiting, SOB, chest pain, abd pain. In the ED, pt was found to have patchy b/o airspace opacity and was given Vancomycin and Cefepime.     Date of service: 02-27-18 @ 10:53    Feels better  No SOB at rest  Weak looking  No fever or chills    MEDICATIONS  (STANDING):  amiodarone    Tablet 400 milliGRAM(s) Oral every 8 hours  aspirin  chewable 81 milliGRAM(s) Oral daily  atorvastatin 80 milliGRAM(s) Oral at bedtime  clopidogrel Tablet 75 milliGRAM(s) Oral daily  latanoprost 0.005% Ophthalmic Solution 1 Drop(s) Both EYES at bedtime  lidocaine   Patch 1 Patch Transdermal daily  lidocaine   Patch 1 Patch Transdermal daily  lisinopril 5 milliGRAM(s) Oral daily  melatonin 3 milliGRAM(s) Oral at bedtime  metoprolol     tartrate 50 milliGRAM(s) Oral every 12 hours  metoprolol    tartrate Injectable 5 milliGRAM(s) IV Push once  sevelamer hydrochloride 800 milliGRAM(s) Oral three times a day with meals    MEDICATIONS  (PRN):  acetaminophen   Tablet. 650 milliGRAM(s) Oral every 6 hours PRN Mild Pain (1 - 3)      Vital Signs Last 24 Hrs  T(C): 36.6 (27 Feb 2018 08:30), Max: 36.9 (27 Feb 2018 01:00)  T(F): 97.8 (27 Feb 2018 08:30), Max: 98.5 (27 Feb 2018 01:00)  HR: 122 (27 Feb 2018 09:10) (81 - 142)  BP: 114/58 (27 Feb 2018 09:10) (77/41 - 114/58)  BP(mean): 70 (27 Feb 2018 06:06) (70 - 70)  RR: 16 (27 Feb 2018 09:10) (16 - 18)  SpO2: 96% (27 Feb 2018 06:06) (94% - 96%)    Physical Exam:  Date of service: 02-27-18 @ 10:53    MEDICATIONS  (STANDING):  amiodarone    Tablet 400 milliGRAM(s) Oral every 8 hours  aspirin  chewable 81 milliGRAM(s) Oral daily  atorvastatin 80 milliGRAM(s) Oral at bedtime  clopidogrel Tablet 75 milliGRAM(s) Oral daily  latanoprost 0.005% Ophthalmic Solution 1 Drop(s) Both EYES at bedtime  lidocaine   Patch 1 Patch Transdermal daily  lidocaine   Patch 1 Patch Transdermal daily  lisinopril 5 milliGRAM(s) Oral daily  melatonin 3 milliGRAM(s) Oral at bedtime  metoprolol     tartrate 50 milliGRAM(s) Oral every 12 hours  metoprolol    tartrate Injectable 5 milliGRAM(s) IV Push once  sevelamer hydrochloride 800 milliGRAM(s) Oral three times a day with meals    MEDICATIONS  (PRN):  acetaminophen   Tablet. 650 milliGRAM(s) Oral every 6 hours PRN Mild Pain (1 - 3)      Vital Signs Last 24 Hrs  T(C): 36.6 (27 Feb 2018 08:30), Max: 36.9 (27 Feb 2018 01:00)  T(F): 97.8 (27 Feb 2018 08:30), Max: 98.5 (27 Feb 2018 01:00)  HR: 122 (27 Feb 2018 09:10) (81 - 142)  BP: 114/58 (27 Feb 2018 09:10) (77/41 - 114/58)  BP(mean): 70 (27 Feb 2018 06:06) (70 - 70)  RR: 16 (27 Feb 2018 09:10) (16 - 18)  SpO2: 96% (27 Feb 2018 06:06) (94% - 96%)    Physical Exam:    Constitutional: frail looking, NAD  HEENT: NC/AT, EOMI, PERRLA  Neck: supple  Back: no tenderness  Respiratory: clear  Cardiovascular: S1S2 regular, no murmurs  Abdomen: soft, nontender, not distended, positive BS  Genitourinary: deferred  Rectal: deferred  Musculoskeletal: no muscle tenderness, no joint swelling or tenderness  Extremities: no pedal edema  Neurological: AxOx3, moving all extremities, no focal deficits  Skin: no rashes        LABS:                          8.7    8.9   )-----------( 301      ( 26 Feb 2018 06:47 )             26.8       02-26    133<L>  |  98  |  63<H>  ----------------------------<  87  4.2   |  24  |  6.68<H>    Ca    8.6      26 Feb 2018 06:47  Phos  3.5     02-26    TPro  7.1  /  Alb  2.2<L>  /  TBili  0.5  /  DBili  x   /  AST  29  /  ALT  28  /  AlkPhos  71  02-26      PT/INR - ( 26 Feb 2018 06:47 )   PT: 45.0 sec;   INR: 4.05 ratio             .Blood Blood-Peripheral  02-25 @ 06:37   No growth to date.  --  --      < from: CT Chest No Cont (02.25.18 @ 06:11) >    EXAM:  CT ABDOMEN AND PELVIS                          EXAM:  CT CHEST                            PROCEDURE DATE:  02/25/2018          INTERPRETATION:  CLINICAL HISTORY:  Trauma. Fall on aspirin.    COMPARISON:  None    TECHNIQUE:  Axial noncontrastCT images of the chest, abdomen, and pelvis   were obtained. Coronal and sagittal reformatted imaged were submitted.   Bone and soft tissue windows were evaluated.     FINDINGS:      CT THORAX    Moderate left and small right pleural effusions with mild bilateral   interstitial edema is noted. Right-sided hemodialysis catheter tip   terminates at the cavoatrial junction. Coronary artery calcifications are   noted. Small pericardial fluid is noted.    Patchy right greater than left upper lobe and bilateral lower lobe   airspace opacity may reflect superimposed infectious or inflammatory   pneumonia.    No mediastinal hematoma, traumatic aortic or great vessel injury,   pneumothorax, hemothorax, or parenchymal contusion is identified. No   acute fracture is seen.     No intrathoracic adenopathy is present. Heart size is normal    CT ABDOMEN PELVIS    Patient's arms overlie the abdomen, producing streak artifact and   degrades image quality.    Nonspecific calcification in the caudate lobe liver is noted.    The spleen, pancreas, and adrenal glands show unremarkable unenhanced   appearance. Gallbladder is unremarkable.     Absent left kidney with adjacent surgical clips likely from nephrectomy.   Multiple right renal cysts and hypoattenuating structures, too small to   characterize.  Small hyperdense structure in the posterior lower right   kidney likely reflects a hemorrhagic cyst versus nonobstructing calculus.    No bowel obstruction or free air.    There is no evidence of skeletal trauma. Multilevel thoracic spondylosis   noted.    Aortobiiliac stent graft repair of an infrarenal abdominal aortic   aneurysm is evident. Patency of the stent graft and overall vascular   evaluation cannot be performed due to noncontrast technique.    IMPRESSION:     CT CHEST: Left greater than right pleural effusions with interstitial   edema due to mild to moderate CHF. Small pericardial effusion.    Patchy bilateral tree-in-bud airspace opacity likely reflects   superimposed multilobar pneumonia. Follow up to resolution.    CT ABDOMEN/PELVIS: No solid organ or bowel injury nor skeletal trauma in   the abdomen and pelvis.    Aortobiiliac stent graft repair. Please note that graft integrity and   vascular structures cannot be evaluated on noncontrast study.    < from: CT Cervical Spine No Cont (02.25.18 @ 06:09) >    EXAM:  CT CERVICAL SPINE                          EXAM:  CT BRAIN                            PROCEDURE DATE:  02/25/2018          INTERPRETATION:  HISTORY: Trauma.    COMPARISON: None     TECHNIQUE: Axial noncontrast CT images of the head and cervical spine   were obtained and submitted for interpretation. Sagittal and coronal   reformatted images of the cervical spine were provided. Bone and soft   tissue windows were evaluated.    FINDINGS:     CT BRAIN:     Right frontal extra-axial cystic structure follows CSF attenuation and   smoothly scallops the inner table of the right frontal calvarium,   measuring 3.5 x 2.0 cm in maximal axial dimensions, most consistent with   arachnoid cyst, epidermoid less likely. No perilesional vasogenic edema.    Moderate chronic microvascular ischemic changes in the periventricular   and deep cerebral white matter is evident. Atheromatous calcifications   along the carotid siphons are noted.    There is no acute intracranial mass-effect, hemorrhage, ormidline shift.   No evidence of hydrocephalus. Basal cisterns are patent.     Small left sphenoid sinus fluid level may reflect acute sinusitis in the   appropriate clinical setting. Remaining sinuses and mastoid air cells are   clear. The calvarium is intact.       CT BRAIN: No acute intracranial bleeding.     Small left sphenoid sinus fluid level may reflect acute sinusitis in the   appropriate clinical setting.     Right frontal extra-axial cystic structure with smooth remodeling of the   inner table of the frontal calvarium likely reflects a benign structure,   such as an arachnoid cyst or epidermoid. This can be confirmed with MRI.    Moderate chronic microvascular ischemic changes.    CT CERVICAL SPINE: No fracture or subluxation. Multilevel spondylosis.    Left pleural effusion. Patchy right upper lobe infiltrate. Biapical   interstitial edema.        < from: Transthoracic Echocardiogram (02.26.18 @ 10:01) >     EXAM:  2D ECHOCARDIOGRAM AD         PROCEDURE DATE:  02/26/2018        INTERPRETATION:  Transthoracic Echocardiography Report (TTE)     Demographics     Patient name        LIZZY TRIPATHI Age           77 year(s)     Med Rec #           [id number]         Gender        Male     Account #           [id number]           Date of Birth 1940     Interpreting        Medardo Piedra     Room Number   0351   Physician     Referring Physician Karen Gilbert MD   Sonographer   [place]                                                  [facility]     Date of study       02/26/2018 09:43                       AM     Height              70 in             Weight        180.78 pounds    Type of Study:     TTE procedure: 2D echocardiogram     BP: 114/62 mmHg     Study Location: 3ETechnical Quality: Fair    Indications   1) R06.00 - Dyspnea    M-Mode Measurements (cm)     LVEDd: 4.64 cm            LVESd: 3.65 cm   IVSEd: 1.11 cm   LVPWd: 1.01 cm            AO Root Dimension: 3.4 cm                   ACS: 1.5 cm                             LA: 4 cm                             LVOT: 2 cm    Doppler Measurements:     AV Velocity:195 cm/s                 MV Peak E-Wave: 78 cm/s   AV Peak Gradient: 15.21 mmHg         MV Peak A-Wave: 108 cm/s   AV Mean Gradient: 8 mmHg             MV E/A Ratio: 0.72 %   AV Area (Continuity):1.52 cm^2       MV Peak Gradient: 2.43 mmHg   TR Velocity:195 cm/s   TR Gradient:15.21 mmHg   Estimated RAP:5 mmHg   RVSP:20 mmHg     Findings     Mitral Valve   The mitral valve leaflets appear thickened.   Moderate mitral annular calcification is present.   Trace mitral regurgitation is present.   EA reversal of the mitral inflow consistent with reduced compliance of   the   left ventricle.     Aortic Valve   Significant fibrocalcific changes noted to the aortic valve leaflets with   restriction in leaflet excursion. Peak transaortic gradient is 15 mmHg;   this finding is consistent with mild aortic stenosis.   Trace aortic regurgitation is present.     Tricuspid Valve   Normal appearing tricuspid valve structure and function.   Trace tricuspid valve regurgitation is present.     Pulmonic Valve   Normal appearing pulmonic valve structure and function.     Left Atrium   The left atrium is mildly dilated.     Left Ventricle   Endocardium is not always well visualized, however, overall left   ventricular systolic function appears mildly decreased, with mild   hypokinesis of the anterolateral wall. Paradoxical septal motion is also   present. Estimated left ventricular ejection fraction is 45 %.     Right Atrium   Normal appearing right atrium.     Right Ventricle   Normal appearing right ventricle structure and function.     Pericardial Effusion   A mild to moderate sized pericardial effusionis present.     Pleural Effusion   Pleural effusion - is present..     Miscellaneous   IVC is collapsing with inspiration.     Impression     Summary     Endocardium is not always well visualized, however, overall left   ventricular systolic functionappears mildly decreased, with mild   hypokinesis of the anterolateral wall. Paradoxical septal motion is also   present. Estimated left ventricular ejection fraction is 45 %.   Significant fibrocalcific changes noted to the aortic valve leaflets with   restriction in leaflet excursion. Peak transaortic gradient is 15 mmHg;   this finding is consistent with mild aortic stenosis.   Trace aortic regurgitation is present.   The mitral valve leaflets appear thickened.   Moderate mitral annular calcification is present.   Trace mitral regurgitation is present.   EA reversal of the mitral inflow consistent with reduced compliance of   the   left ventricle.   A mild to moderate sized pericardial effusion is present.     Signature     ----------------------------------------------------------------   Electronically signed by Anoop PiedraInterpreting physician)   on 02/26/2018 12:09 PM   ----------------------------------------------------------------    Valves     Mitral Valve     Peak E-Wave: 78 cm/s   Peak A-Wave: 108 cm/s   Peak Gradient: 2.43 mmHg                                                 E/A Ratio: 0.72     Aortic Valve     Peak Velocity: 195 cm/s            Area (continuity): 1.52 cm^2   Peak Gradient: 15.21 mmHg          Mean Gradient: 8 mmHg                                      AV VTI: 34.5 cm   Cusp Separation: 1.5 cm     Tricuspid Valve     TR Velocity: 195 cm/s                Estimated RAP: 5 mmHg   TR Gradient: 15.21 mmHg              Estimated RVSP: 20 mmHg     Pulmonic Valve              Estimated PASP: 20.21 mmHg     LVOT     LVOT Diameter: 2 cm                 Mean Gradient: 1 mmHg                                       LVOT VTI: 16.7 cm    Structures     Left Atrium     LA Dimension: 4 cm         LA Area: 21.4 cm^2   LA/Aorta: 1.18             LA Volume/Index: 76 ml /38m^2     Left Ventricle     Diastolic Dimension: 4.64 cm          Systolic Dimension: 3.65 cm   Septum Diastolic: 1.11 cm   PW Diastolic: 1.01 cm     FS: 21.34 %   LVOT Diameter: 2 cm     Right Atrium     RA Systolic Pressure: 5 mmHg     Right Ventricle              RV Systolic Pressure: 20.21 mmHg     Miscellaneous     Aorta     Aortic Root: 3.4 cm   LVOT Diameter: 2 cm

## 2018-02-27 NOTE — PROGRESS NOTE ADULT - PROBLEM SELECTOR PLAN 1
CHF education done with patient and patient's sister, who helps medically manage  Educational handouts provided including:  Signs and symptoms of CHF exacerbation   Daily Weights  What to do if symptoms worsen  How, when, and why to take medication  lifestyle changes  limiting sodium intake to 1500mg-2000mg daily  when to contact HCP  Ejection Fraction 45%      Post d/c follow up appointment to be made by unit clerk when medically stable

## 2018-02-27 NOTE — PROGRESS NOTE ADULT - ASSESSMENT
PHYSICAL EXAM:  GENERAL: NAD, well-developed  HEAD:  Atraumatic, Normocephalic  EYES: EOMI, PERRLA, conjunctiva and sclera clear  NECK: Supple, No JVD  CHEST/LUNG: Clear to auscultation bilaterally  HEART: Regular rate and rhythm; No murmurs, rubs, or gallops  ABDOMEN: Soft, Nontender, Nondistended; Bowel sounds present  EXTREMITIES:  2+ Peripheral Pulses, No clubbing, cyanosis, or edema  PSYCH: AAOx2; intermittent confusion with periods of short lived lucidity   NEUROLOGY: non-focal  SKIN: No rashes or lesions; right chest wall dialysis access with dressing intact.

## 2018-02-27 NOTE — PROGRESS NOTE ADULT - ASSESSMENT
*Acute on chronic systolic/distolic CHF due to ischemic cardiomyopathy/CAD s/p stent placement with pleural effusion  -echo 1/2018:EF 25%  - repeat TTE 2/26 with LVEF 45%  -recent RICHARDSON to proximal LAD, diagonal 2 and 1 circumflex  -known risk factors: HTN, hyperlipidemia  -telemetry  -Aspirin and Plavix, Toprol XL. 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 s/p failed interventions  -Known R renal tumor, s/p L nephrectomy  -HD T/T/S PTA via permacath  -Eventual AVF with [facility]?, they had refused prior to d/c from [facility]  - plan for HD today at bedside (he returned from the HD unit d/t afib RVR)  -MVI to replace water soluble vitamins lost in HD    *Fall  -trauma evaluation with panscan, plain films  - hip xray no fracture  - PT evaluation  -likely return to SNF on discharge    *HCAP without sepsis  -Possible imaging is showing pneumonia from [facility] that has not reached radiographic resolution yet rather than new pneumia  -source evaluation ongoing with blood culture no growth to date, sputum culture ordered  -CT CHEST: L> R pleural effusions with interstitial edema due to mild to moderate CHF. Small pericardial effusion.  Patchy bilateral tree-in-bud airspace opacity likely reflects superimposed multilobar pneumonia. Follow up to resolution.  - dosed with cefepime and Vanco x 2 days  -ID consult: appreciate input--> doubt PNA, no abt    - blood cx 2/25 no growth to date    *PAF (paroxysmal atrial fibrillation) on chronic anticoagulation with supratherapeutic INR  2/27 report of brief episode AFIB w/ while in HD unit (was not being dialyzed yet)  - amiodarone 400mg every 8 hrs initiated  - con't metoprolol / telemonitor  - CHADS2=3. Coumadin on hold due to elevated INR.  - INR 4, holding daily dosing      carotid artery disease  - non-obstructive carotid artery disease by MRA of the neck in April 2017 he has  50-60% left ICA stenosis  - con't statin / BB / asa / plavix    Anemia  Epogen with HD. Guaiac negative 02/18  - to be transfused 1 unit PRBC's today during HD    Dispo  - transfer to critical unit per cardiology request  - pt, sister, and son update on plan of care, all questions answered

## 2018-02-27 NOTE — PROGRESS NOTE ADULT - SUBJECTIVE AND OBJECTIVE BOX
HPI:  Patient is 77-year-old he has a history of hypertension, high cholesterol, coronary artery disease, chronic left bundle branch block, history of bladder cancer status post left nephrectomy in the past, carotid artery disease he has nonobstructive carotid artery disease by MRA of the neck in April 2017 he has  50-60% left ICA stenosis, aortic abdominal aneurysm and he status post endovascular repair in January 2018, his  postoperative course was complicated by development of congestive heart failure and his echocardiogram has revealed left ventricular ejection fraction of approximately 25%, he status post cardiac catheterization and he status post stent placement to proximal LAD, diagonal 2 and 1 circumflex. He had  drug-eluting stent placed. He  now has end-stage renal disease and requires hemodialysis. He was also treated for pneumonia & paroxysmal atrial fibrillation. He also has occluded right renal artery but stent could not be inserted.  . After admission patient has episodes of atrial fibrillation with rapid ventricular response. His blood pressure is marginal. He denies any chest pain, but complains of generalized fatigue. He is alert and oriented. He was undergoing dialysis and was noted to be in atrial fibrillation with rapid ventricular response. He is being given intravenous Lopressor . His echocardiogram revealed left radical ejection fraction 45%. Small to moderate epicardial effusion. He is alert & oriented.       Transthoracic Echocardiogram (02.26.18   Summary     Endocardium is not always well visualized, however, overall left   ventricular systolic functionappears mildly decreased, with mild   hypokinesis of the anterolateral wall. Paradoxical septal motion is also   present. Estimated left ventricular ejection fraction is 45 %.   Significant fibrocalcific changes noted to the aortic valve leaflets with   restriction in leaflet excursion. Peak transaortic gradient is 15 mmHg;   this finding is consistent with mild aortic stenosis.   Trace aortic regurgitation is present.   The mitral valve leaflets appear thickened.   Moderate mitral annular calcification is present.   Trace mitral regurgitation is present.   EA reversal of the mitral inflow consistent with reduced compliance of   the   left ventricle.   A mild to moderate sized pericardial effusion is present.             CT Chest No Cont (02.25.18   CT THORAX      IMPRESSION:     CT CHEST: Left greater than right pleural effusions with interstitial   edema due to mild to moderate CHF. Small pericardial effusion.    Patchy bilateral tree-in-bud airspace opacity likely reflects   superimposed multilobar pneumonia. Follow up to resolution.    CT ABDOMEN/PELVIS: No solid organ or bowel injury nor skeletal trauma in   the abdomen and pelvis.    Aortobiiliac stent graft repair. Please note that graft integrity and   vascular structures cannot be evaluated on noncontrast study.        PAST MEDICAL & SURGICAL HISTORY:  CAD (coronary artery disease)  History of heart artery stent: DIONICIO  Presence of stent in LAD coronary artery  History of nephrectomy: Left  S/P AAA repair      MEDICATIONS  (STANDING):  amiodarone    Tablet 400 milliGRAM(s) Oral every 8 hours  aspirin  chewable 81 milliGRAM(s) Oral daily  atorvastatin 80 milliGRAM(s) Oral at bedtime  clopidogrel Tablet 75 milliGRAM(s) Oral daily  latanoprost 0.005% Ophthalmic Solution 1 Drop(s) Both EYES at bedtime  lidocaine   Patch 1 Patch Transdermal daily  lidocaine   Patch 1 Patch Transdermal daily  lisinopril 5 milliGRAM(s) Oral daily  melatonin 3 milliGRAM(s) Oral at bedtime  metoprolol     tartrate 50 milliGRAM(s) Oral every 12 hours  metoprolol    tartrate Injectable 5 milliGRAM(s) IV Push once  sevelamer hydrochloride 800 milliGRAM(s) Oral three times a day with meals    MEDICATIONS  (PRN):  acetaminophen   Tablet. 650 milliGRAM(s) Oral every 6 hours PRN Mild Pain (1 - 3)          REVIEW OF SYSTEM:  Pertinent items are noted in HPI.  Constitutional	Negative for chills, fevers ..    Eyes: 	Negative for visual disturbance.  Ears, nose, mouth, throat, and face: Negative for epistaxis, nasal congestion, sore throat .  Neck:	Negative for enlargement, pain and difficulty in swallowing  Respiration : Negative for cough, , pleuritic chest pain and wheezing  Cardiovascular: Negative for chest pain, and palpitations    Gastrointestinal : Negative for abdominal pain, diarrhea, nausea and vomiting  Genitourinary: Negative for dysuria, frequency and urinary incontinence .  Skin: Negative for  rash, pruritus, swelling, dryness .  	  Hematologic/lymphatic: Negative for bleeding and easy bruising  Musculoskeletal: Negative for arthralgias, back pain and muscle weakness.  Neurological: Negative for dizziness, headaches, seizures and tremors  Behavioral/Psych: Negative for mood change, depression.  Endocrine:	Negative for blurry vision, polydipsia and polyuria, diaphoresis.   Allergic/Immunologic:	Negative for anaphylaxis, angioedema and urticaria.      Vital Signs Last 24 Hrs  T(C): 36.6 (27 Feb 2018 08:30), Max: 36.9 (27 Feb 2018 01:00)  T(F): 97.8 (27 Feb 2018 08:30), Max: 98.5 (27 Feb 2018 01:00)  HR: 122 (27 Feb 2018 09:10) (81 - 142)  BP: 114/58 (27 Feb 2018 09:10) (77/41 - 114/58)  BP(mean): 70 (27 Feb 2018 06:06) (70 - 70)  RR: 16 (27 Feb 2018 09:10) (16 - 18)  SpO2: 96% (27 Feb 2018 06:06) (94% - 96%)    I&O's Summary    PHYSICAL EXAM  General Appearance: cooperative, no acute distress,   HEENT: PERRL, conjunctiva clear, EOM's intact, non injected pharynx, no exudate .   Neck: Supple, , no adenopathy, thyroid: not enlarged, no carotid bruit or JVD  Lungs: Diminished breath sounds bases.  Heart: Irregular rate and rhythm, S1, S2 normal, no murmur, rub or gallop  Abdomen: Soft, non-tender, bowel sounds active , no hepatosplenomegaly  Extremities: no cyanosis or edema, no joint swelling  Skin: Skin color, texture normal, no rashes   Neurologic: Alert and oriented X3 , cranial nerves intact, sensory and motor normal,        INTERPRETATION OF TELEMETRY: A Fib with episodes  RVR            LABS:                          7.9    8.8   )-----------( 299      ( 27 Feb 2018 07:03 )             24.4     02-27    134<L>  |  99  |  79<H>  ----------------------------<  97  4.1   |  20<L>  |  8.26<H>    Ca    8.4<L>      27 Feb 2018 07:03  Phos  3.5     02-26    TPro  6.5  /  Alb  2.1<L>  /  TBili  0.4  /  DBili  x   /  AST  25  /  ALT  22  /  AlkPhos  66  02-27            PT/INR - ( 26 Feb 2018 06:47 )   PT: 45.0 sec;   INR: 4.05 ratio

## 2018-02-27 NOTE — PROGRESS NOTE ADULT - SUBJECTIVE AND OBJECTIVE BOX
Patient is a 77y Male who reported AF ON, this AM. Taken to HD briefly, seen by Dr. Jones and suggested HD in monitored setting.     REVIEW OF SYSTEMS:    CONSTITUTIONAL: No weakness, fevers or chills  RESPIRATORY: No cough, wheezing, hemoptysis; No shortness of breath  CARDIOVASCULAR: No chest pain or palpitations  GENITOURINARY: No dysuria, frequency or hematuria  All other review of systems is negative unless indicated above.    MEDICATIONS  (STANDING):  amiodarone    Tablet 400 milliGRAM(s) Oral every 8 hours  aspirin  chewable 81 milliGRAM(s) Oral daily  atorvastatin 80 milliGRAM(s) Oral at bedtime  clopidogrel Tablet 75 milliGRAM(s) Oral daily  latanoprost 0.005% Ophthalmic Solution 1 Drop(s) Both EYES at bedtime  lidocaine   Patch 1 Patch Transdermal daily  lidocaine   Patch 1 Patch Transdermal daily  lisinopril 5 milliGRAM(s) Oral daily  melatonin 3 milliGRAM(s) Oral at bedtime  metoprolol     tartrate 50 milliGRAM(s) Oral every 12 hours  metoprolol    tartrate Injectable 5 milliGRAM(s) IV Push once  sevelamer hydrochloride 800 milliGRAM(s) Oral three times a day with meals    MEDICATIONS  (PRN):  acetaminophen   Tablet. 650 milliGRAM(s) Oral every 6 hours PRN Mild Pain (1 - 3)        T(C): , Max: 36.9 (02-27-18 @ 01:00)  T(F): , Max: 98.5 (02-27-18 @ 01:00)  HR: 122 (02-27-18 @ 09:10)  BP: 114/58 (02-27-18 @ 09:10)  BP(mean): 70 (02-27-18 @ 06:06)  RR: 16 (02-27-18 @ 09:10)  SpO2: 96% (02-27-18 @ 06:06)  Wt(kg): --        PHYSICAL EXAM:    Constitutional: frail  HEENT: PERRLA, EOMI,  MMM  Neck: No LAD, No JVD  Respiratory: dec b/l bases  Cardiovascular: S1 and S2, RRR  Gastrointestinal: BS+, soft, NT/ND  Extremities: No peripheral edema  Neurological: A/O x 3, no focal deficits  Psychiatric: Normal mood, normal affect  : No Vallecillo  Skin: No rashes  Access: nasir cath R        LABS:                        7.9    8.8   )-----------( 299      ( 27 Feb 2018 07:03 )             24.4     27 Feb 2018 07:03    134    |  99     |  79     ----------------------------<  97     4.1     |  20     |  8.26   26 Feb 2018 06:47    133    |  98     |  63     ----------------------------<  87     4.2     |  24     |  6.68   25 Feb 2018 05:23    136    |  99     |  38     ----------------------------<  91     4.3     |  28     |  4.83     Ca    8.4        27 Feb 2018 07:03  Ca    8.6        26 Feb 2018 06:47  Ca    8.6        25 Feb 2018 05:23  Phos  3.5       26 Feb 2018 06:47    TPro  6.5    /  Alb  2.1    /  TBili  0.4    /  DBili  x      /  AST  25     /  ALT  22     /  AlkPhos  66     27 Feb 2018 07:03  TPro  7.1    /  Alb  2.2    /  TBili  0.5    /  DBili  x      /  AST  29     /  ALT  28     /  AlkPhos  71     26 Feb 2018 06:47  TPro  7.4    /  Alb  2.4    /  TBili  0.6    /  DBili  x      /  AST  27     /  ALT  27     /  AlkPhos  76     25 Feb 2018 05:23          Urine Studies:          RADIOLOGY & ADDITIONAL STUDIES:

## 2018-02-27 NOTE — PROGRESS NOTE ADULT - ASSESSMENT
Status post fall  Chronic systolic/diastolic congestive heart failure.  Left pleural effusion.  Small to moderate pericardial effusion .  Coronary artery disease status post multiple stents January 2018.  Aortic abdominal aneurysm status post endovascular repair.  Hypertension.  End-stage renal disease he is on hemodialysis.  Renal and bladder cancer in the past status post surgery.  Anemia.  ESRD.  Suggest  Observe on telemetry.  Continue with current cardiac medications.  Start IV Lopressor , if heart rate  is not controlled start intravenous Cardizem.  Due to recurrent episodes of atrial fibrillation start amiodarone 400 mg by mouth 3 times a day will decrease the dose a few days.  Follow-up with electrolytes.  He will require blood transfusion.  Transfer patient to CCU, Intensivist is being consulted.  Follow low sodium low cholesterol diet.  Adjust INR approximately 2.0.  Antibiotics as per hospitalist.  Discussed general condition with patient's daughter.  Discussed with the hospitalist and pulmonologist  & nephrologist.

## 2018-02-27 NOTE — PROGRESS NOTE ADULT - ASSESSMENT
77 with recent EVAAR on 1/29/18 [facility], hx of Left Nephrectomy for renal cancer 2008, Bladder Cancer, re admitted on Feb. 4, 2018 for Acute Systolic CHF requiring HD in addition to Bipap, Afib on coumadin, HTN, HLD, CAD occlusion of Right renal artery presents from [facility] after fall.     ESRD  -HD T/T/S, treatment. Complete treatment when stabilized from a cardiac standpoint/in CCU  -K protocol  -UF as tolerated, difficult to optimize with AF at this point  -Eventual AVF with [facility]?, they had refused prior to d/c from [facility]    Fall  -Medicine workup ongoing  - tele/PT    Renal mass  -Was known from last hospital stay with [facility], will need plan with discharge  -IF remains ESRD with lost renal artery, nephrectomy would be only reasonable option    Anemia  -prbc x 1 with HD today  -Potential for JANNA when malignacy issues clarified    d/c with family at length  d/c with staff  d/c with Dr Jones  d/c with Dr. Perez

## 2018-02-27 NOTE — PROGRESS NOTE ADULT - SUBJECTIVE AND OBJECTIVE BOX
RN reported that Pt is extremely anxious after HD. Pt has Hx of Anxiety was on Xanax and now d/maryellen since 01/2018. Pt takes Valium prn at home for Anxiety. Pt received Valium 2.5 mg PO x 1 this afternoon. Pt had HD after that and now extremely anxious.     Pt was seen and Evaluated by Psych today. Psych recommends to give Valium for Anxiety if needed. Valium 2.5 mg po x1 ordered for Anxiety.  D/w Night RN.

## 2018-02-27 NOTE — BEHAVIORAL HEALTH ASSESSMENT NOTE - SUMMARY
77 yowm, living with Aminta LIM, looks younger than states age, no formal PPH, multiple medical comorbidities, ESRF on dialysis, nephrectomy, AAA, Afib, consulted for anxiety and depression, details for consult unclear.  Pt denies current psych provider but has Valium 10 mg bid ordered by Asael Dang for past year and Pt states he has been using for approx 5 yrs.  Per RN on unit Xanax was discontinued resulting in withdrawal delirium and since has been on single doses of Valium 2.5 mg.  Pt denies daily use of Valium, minimized amts used, however cannot rule out dependency.  Would recommend continuing Valium but reduce dose to 5 mg bid or tid, prn.  No acute psychiatric concerns elicited by eval.  Would recommend follow up with psychiatric provider to monitor meds and  for symptoms of anxiety and depression.  Pt is not an imminent danger to self or others and is cleared psychiatrically for discharge.

## 2018-02-27 NOTE — BEHAVIORAL HEALTH ASSESSMENT NOTE - NSBHREFERDETAILS_PSY_A_CORE_FT
History of Present Illness:  Chief Complaint/Reason for Admission: Fall and left leg and chest wall pain  History of Present Illness:   78 y/o M hx AAA 5.5 cm s/p EVAAR repair with stents 0n 1/29/18 [facility] with incidental finding of Right renal neoplasm, s/p Left Nephrectomy for renal cancer 2008, Bladder Cancer, LBBB, admitted on Feb. 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 presents from [facility] after fall. Pt reports he was walking from bed to close his door around 3:00 AM and did not use his walker and fell. He denies hitting head or LOC. Only c/o pain to Left chest wall. Denies CP/SOB, light headedness. Reports he only wants XR and refusing blood work on arrival.     Patient was seen with daughter at bedside and sister. Patient denies nausea, vomiting, bleeding or dysuria. Patient has been making some urine now. No diarrhea or abdominal pain or headache. Some cough.       Pt referred to psychiatry for anxiety and depression, details of request unclear.  Per RN on unit Pt became confused when "Xanax" was d'c which he received while at [facility], resulting in confusion.  Pt has received Valium 2.5 mg one time dose x 2 at , per RN.  Istop reflects multiple rx for Valium 10 mg bid since June 2017

## 2018-02-27 NOTE — CHART NOTE - NSCHARTNOTEFT_GEN_A_CORE
Called by nurse @ ~6:15 AM for conversion to afib w/ RVR, HR in 130s. Ordered 1x IV metoprolol 5 mg.

## 2018-02-27 NOTE — PROVIDER CONTACT NOTE (CHANGE IN STATUS NOTIFICATION) - ACTION/TREATMENT ORDERED:
continue to monitor patient
pt medicated as per orders, quiet environment provided, emotional support given

## 2018-02-27 NOTE — PROGRESS NOTE ADULT - ASSESSMENT
76 y/o male with h/o AAA s/p surgical repair with stents on 1/29/18 [facility], recently diagnosed right renal neoplasm, prior renal Ca s/p left Nephrectomy in 2008, bladder cancer, LBBB, Permacath right subclavian for HD due to ESRD, A.fib on coumadin, HTN, HLD, CAD s/p 3 stents, HIT was admitted on 2/25/18 for left chest wall pain and left leg pain s/p fell the night PTA. Pt stated he fell when he was walking without his walker to close his door. Denies head trauma or LOC. Pt reports productive cough x 3 weeks PTA. Pt denies fever, chills, nausea, vomiting, SOB, chest pain, abd pain. In the ED, pt was found to have patchy b/o airspace opacity and was given Vancomycin and Cefepime.     1. Dyspnea. Likely CHF exacerbation. ESRD on HD.   -doubt pneumonia  -b/l patchy opacities noted on CT chest - likely related to pulmonary congestion  -on cefepime 1 gm IV qd  -tolerating abx well so far; no side effects noted  -remains afebrile  -continue to observe off abx coverage  -pulmonary evaluation appreciated  -monitor BMP, vitals    -f/u CBC   -supportive care    2. Other issues:   -care per medicine

## 2018-02-27 NOTE — BEHAVIORAL HEALTH ASSESSMENT NOTE - NSBHCHARTREVIEWVS_PSY_A_CORE FT
ICU Vital Signs Last 24 Hrs  T(C): 36.6 (27 Feb 2018 17:00), Max: 36.9 (27 Feb 2018 01:00)  T(F): 97.8 (27 Feb 2018 17:00), Max: 98.5 (27 Feb 2018 01:00)  HR: 93 (27 Feb 2018 17:00) (86 - 142)  BP: 104/40 (27 Feb 2018 17:00) (98/51 - 114/58)  BP(mean): 72 (27 Feb 2018 16:00) (65 - 72)  ABP: --  ABP(mean): --  RR: 21 (27 Feb 2018 17:00) (16 - 27)  SpO2: 96% (27 Feb 2018 06:06) (94% - 96%)

## 2018-02-27 NOTE — BEHAVIORAL HEALTH ASSESSMENT NOTE - HPI (INCLUDE ILLNESS QUALITY, SEVERITY, DURATION, TIMING, CONTEXT, MODIFYING FACTORS, ASSOCIATED SIGNS AND SYMPTOMS)
Pt interviewed in private room, well engaged, cooperative, jovial at times with sarcastic undertones, denies formal PPH but admits to seeing a psychiatrist to "talk" for 2 sessions then was told,, "You don't need to be here".  History obtained primarily by Pt and message left for SO Aminta to return call for collaterals and ordering MD notified for clarification of reason for request.  Pt denies depressive disorder, claiming anyone would be depressed with so many medical concerns, but denies acuity of   depression current or in past in past.  Pt denies prior psych admission or h/o alcohol or drug abuse.  Admits to having legal issues in past and incarcerated for 3 nights, but refused to elaborate further.  Pt reports he has been on Valium by Asael Dang in recent past and reports he has been on for approx 5 yr and claims he has always been hyperactive since childhood, but no treatment reported.  Mood neutral, cooperative, speech mildly pressured, jovial , no evidence of a thought disorder, depression, Delirium psychosis or claire.  Denies SI/HI/AH/VH.

## 2018-02-28 LAB
ANION GAP SERPL CALC-SCNC: 8 MMOL/L — SIGNIFICANT CHANGE UP (ref 5–17)
APTT BLD: 35.5 SEC — SIGNIFICANT CHANGE UP (ref 27.5–37.4)
BUN SERPL-MCNC: 44 MG/DL — HIGH (ref 7–23)
CALCIUM SERPL-MCNC: 8.2 MG/DL — LOW (ref 8.5–10.1)
CHLORIDE SERPL-SCNC: 100 MMOL/L — SIGNIFICANT CHANGE UP (ref 96–108)
CO2 SERPL-SCNC: 28 MMOL/L — SIGNIFICANT CHANGE UP (ref 22–31)
CREAT SERPL-MCNC: 5.28 MG/DL — HIGH (ref 0.5–1.3)
GLUCOSE SERPL-MCNC: 95 MG/DL — SIGNIFICANT CHANGE UP (ref 70–99)
HCT VFR BLD CALC: 26.2 % — LOW (ref 39–50)
HGB BLD-MCNC: 8.6 G/DL — LOW (ref 13–17)
INR BLD: 1.92 RATIO — HIGH (ref 0.88–1.16)
MCHC RBC-ENTMCNC: 29.6 PG — SIGNIFICANT CHANGE UP (ref 27–34)
MCHC RBC-ENTMCNC: 32.7 GM/DL — SIGNIFICANT CHANGE UP (ref 32–36)
MCV RBC AUTO: 90.6 FL — SIGNIFICANT CHANGE UP (ref 80–100)
PLATELET # BLD AUTO: 270 K/UL — SIGNIFICANT CHANGE UP (ref 150–400)
POTASSIUM SERPL-MCNC: 4.4 MMOL/L — SIGNIFICANT CHANGE UP (ref 3.5–5.3)
POTASSIUM SERPL-SCNC: 4.4 MMOL/L — SIGNIFICANT CHANGE UP (ref 3.5–5.3)
PROTHROM AB SERPL-ACNC: 21 SEC — HIGH (ref 9.8–12.7)
RBC # BLD: 2.89 M/UL — LOW (ref 4.2–5.8)
RBC # FLD: 14.1 % — SIGNIFICANT CHANGE UP (ref 10.3–14.5)
SODIUM SERPL-SCNC: 136 MMOL/L — SIGNIFICANT CHANGE UP (ref 135–145)
TSH SERPL-MCNC: 10.7 UIU/ML — HIGH (ref 0.36–3.74)
WBC # BLD: 7.5 K/UL — SIGNIFICANT CHANGE UP (ref 3.8–10.5)
WBC # FLD AUTO: 7.5 K/UL — SIGNIFICANT CHANGE UP (ref 3.8–10.5)

## 2018-02-28 RX ORDER — DIAZEPAM 5 MG
5 TABLET ORAL
Qty: 0 | Refills: 0 | Status: DISCONTINUED | OUTPATIENT
Start: 2018-02-28 | End: 2018-03-07

## 2018-02-28 RX ORDER — DOCUSATE SODIUM 100 MG
100 CAPSULE ORAL
Qty: 0 | Refills: 0 | Status: DISCONTINUED | OUTPATIENT
Start: 2018-02-28 | End: 2018-03-12

## 2018-02-28 RX ORDER — DIAZEPAM 5 MG
5 TABLET ORAL
Qty: 0 | Refills: 0 | Status: DISCONTINUED | OUTPATIENT
Start: 2018-02-28 | End: 2018-02-28

## 2018-02-28 RX ADMIN — Medication 50 MILLIGRAM(S): at 18:21

## 2018-02-28 RX ADMIN — ATORVASTATIN CALCIUM 80 MILLIGRAM(S): 80 TABLET, FILM COATED ORAL at 21:15

## 2018-02-28 RX ADMIN — AMIODARONE HYDROCHLORIDE 400 MILLIGRAM(S): 400 TABLET ORAL at 13:05

## 2018-02-28 RX ADMIN — AMIODARONE HYDROCHLORIDE 400 MILLIGRAM(S): 400 TABLET ORAL at 06:16

## 2018-02-28 RX ADMIN — SEVELAMER CARBONATE 800 MILLIGRAM(S): 2400 POWDER, FOR SUSPENSION ORAL at 18:21

## 2018-02-28 RX ADMIN — Medication 5 MILLIGRAM(S): at 21:15

## 2018-02-28 RX ADMIN — Medication 81 MILLIGRAM(S): at 11:52

## 2018-02-28 RX ADMIN — LATANOPROST 1 DROP(S): 0.05 SOLUTION/ DROPS OPHTHALMIC; TOPICAL at 21:16

## 2018-02-28 RX ADMIN — Medication 50 MILLIGRAM(S): at 06:16

## 2018-02-28 RX ADMIN — CLOPIDOGREL BISULFATE 75 MILLIGRAM(S): 75 TABLET, FILM COATED ORAL at 11:52

## 2018-02-28 RX ADMIN — LISINOPRIL 5 MILLIGRAM(S): 2.5 TABLET ORAL at 06:17

## 2018-02-28 RX ADMIN — SEVELAMER CARBONATE 800 MILLIGRAM(S): 2400 POWDER, FOR SUSPENSION ORAL at 08:26

## 2018-02-28 RX ADMIN — Medication 3 MILLIGRAM(S): at 21:15

## 2018-02-28 RX ADMIN — SEVELAMER CARBONATE 800 MILLIGRAM(S): 2400 POWDER, FOR SUSPENSION ORAL at 11:52

## 2018-02-28 RX ADMIN — AMIODARONE HYDROCHLORIDE 400 MILLIGRAM(S): 400 TABLET ORAL at 21:15

## 2018-02-28 NOTE — PROGRESS NOTE ADULT - SUBJECTIVE AND OBJECTIVE BOX
Patient is a 77y old  Male who presents with a chief complaint of Fall and left leg and chest wall pain (25 Feb 2018 09:47)      HPI:  76 y/o M hx AAA 5.5 cm s/p EVAAR repair with stents 0n 1/29/18 [facility] with incidental finding of Right renal neoplasm, s/p Left Nephrectomy for renal cancer 2008, Bladder Cancer, LBBB, admitted on Feb. 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 presents from [facility] after fall. Pt reports he was walking from bed to close his door around 3:00 AM and did not use his walker and fell. He denies hitting head or LOC. Only c/o pain to Left chest wall. Denies CP/SOB, light headedness. Reports he only wants XR and refusing blood work on arrival.   Patient was seen with daughter at bedside and sister. Patient denies nausea, vomiting, bleeding or dysuria. Patient has been making some urine now. No diarrhea or abdominal pain or headache. Some cough. (25 Feb 2018 09:47)    2/28/18  Transferred to Cardiac StepDown overnight      PAST MEDICAL & SURGICAL HISTORY:  CAD (coronary artery disease)  History of heart artery stent: DIONICIO  Presence of stent in LAD coronary artery  History of nephrectomy: Left  S/P AAA repair      PREVIOUS DIAGNOSTIC TESTING:      MEDICATIONS  (STANDING):  aspirin  chewable 81 milliGRAM(s) Oral daily  atorvastatin 80 milliGRAM(s) Oral at bedtime  clopidogrel Tablet 75 milliGRAM(s) Oral daily  latanoprost 0.005% Ophthalmic Solution 1 Drop(s) Both EYES at bedtime  lidocaine   Patch 1 Patch Transdermal daily  lidocaine   Patch 1 Patch Transdermal daily  lisinopril 5 milliGRAM(s) Oral daily  melatonin 3 milliGRAM(s) Oral at bedtime  metoprolol     tartrate 50 milliGRAM(s) Oral every 12 hours  sevelamer hydrochloride 800 milliGRAM(s) Oral three times a day with meals    MEDICATIONS  (PRN):  acetaminophen   Tablet. 650 milliGRAM(s) Oral every 6 hours PRN Mild Pain (1 - 3)        REVIEW OF SYSTEM:  Dyspnea, fatigue, otherwise 12 point ROS Negative     Vital Signs Last 24 Hrs  T(C): 36.6 (27 Feb 2018 08:30), Max: 36.9 (27 Feb 2018 01:00)  T(F): 97.8 (27 Feb 2018 08:30), Max: 98.5 (27 Feb 2018 01:00)  HR: 86 (27 Feb 2018 08:30) (81 - 134)  BP: 98/51 (27 Feb 2018 08:30) (77/41 - 113/61)  BP(mean): 70 (27 Feb 2018 06:06) (70 - 70)  RR: 17 (27 Feb 2018 08:30) (16 - 18)  SpO2: 96% (27 Feb 2018 06:06) (94% - 96%)    I&O's Summary    PHYSICAL EXAM  General Appearance: cooperative, no acute distress,   HEENT: PERRL, conjunctiva clear, EOM's intact, non injected pharynx, no exudate, TM   normal  Neck: Supple, , no adenopathy, thyroid: not enlarged, no carotid bruit or JVD  Back: Symmetric, no  tenderness,no soft tissue tenderness  Lungs: Coarse bilaterally   Heart: Regular rate and rhythm, S1, S2 normal, no murmur, rub or gallop  Abdomen: Soft, non-tender, bowel sounds active , no hepatosplenomegaly  Extremities: no cyanosis or edema, no joint swelling  Skin: Skin color, texture normal, no rashes   Neurologic: Alert and oriented X3 , cranial nerves intact, sensory and motor normal,    ECG:    LABS:                          8.7    8.9   )-----------( 301      ( 26 Feb 2018 06:47 )             26.8     02-26    133<L>  |  98  |  63<H>  ----------------------------<  87  4.2   |  24  |  6.68<H>    Ca    8.6      26 Feb 2018 06:47  Phos  3.5     02-26    TPro  7.1  /  Alb  2.2<L>  /  TBili  0.5  /  DBili  x   /  AST  29  /  ALT  28  /  AlkPhos  71  02-26            PT/INR - ( 26 Feb 2018 06:47 )   PT: 45.0 sec;   INR: 4.05 ratio         PTT - ( 25 Feb 2018 05:23 )  PTT:39.3 sec          RADIOLOGY & ADDITIONAL STUDIES:

## 2018-02-28 NOTE — PROGRESS NOTE ADULT - SUBJECTIVE AND OBJECTIVE BOX
HOSPITAL COURSE AND ASSESSMENT  76 y/o M hx AAA 5.5 cm s/p EVAAR repair with stents recently discharged on 1/29/18 [facility] with incidental finding of Right renal neoplasm, s/p Left Nephrectomy for renal cancer 2008, Bladder Cancer, LBBB, admitted on Feb. 4, 2018 for ADHF due to Acute Systolic CHF-Echo 2/18/18 EF ~25%, and Elevated Troponin requiring HD in addition to BIPAP s/p Permacath right subclavian for HD due to ESRD on HD TThS, Afib on coumadin, HTN, HLD, CAD s/p 3 stents for Proximal LAD 95%, D2 90% and OM1 90% RICHARDSON, RAJEEV, also treated for PNA HCAP and Occlusion of Right renal artery but could not be intervened presents from [facility] after fall. Pt reports he was walking from bed to close his door around 3:00 AM and did not use his walker and fell. He denies hitting head or LOC. Only c/o pain to Left chest wall. Denies CP/SOB, light headedness. Reports he only wants XR and refused blood work on arrival. Patient is a poor historian, agitated and angry, cursing at spouse.        Pt was admitted to telemetry for treatment of acute on chronic CHF exacerbation. Multiple consultants involved in care with ID, Pulm, Cards, Renal all following in house. Transferred to CICU after episode of atrial fibrillation and hypotension at HD 2/27. Course complicated by patient's intermittent agitation. Psych was also involved for possible benzo withdrawal, but family did not want benzos scheduled, even at reduced doses.     Record review from [facility] pending      *Acute on chronic systolic/distolic CHF due to ischemic cardiomyopathy/CAD s/p stent placement with pleural effusion  -echo 1/2018:EF 25%  - repeat TTE 2/26 with LVEF 45%  -recent RICHARDSON to proximal LAD, diagonal 2 and 1 circumflex  -known risk factors: HTN, hyperlipidemia  -telemetry  -Aspirin and Plavix, Toprol XL. Statin, Lisinopril  -pt refused extra HD for volume initially, then accepted  - Cardiology following      *ESRD, HD initiated 1/2018  -Complex recent history but appears to be essentially anuric from occlusion of single kidney system due to arterial occlusion s/p failed interventions  -Known R renal tumor, s/p L nephrectomy  -HD T/T/S PTA via permacath  -Eventual AVF with [facility]?, they had refused prior to d/c from [facility]  -MVI to replace water soluble vitamins lost in HD  -family would like renal flow study prior to discharge    *Fall  -trauma evaluation with panscan, plain films  - hip xray no fracture  - PT evaluation  -likely return to SNF on discharge, though family would like to switch facilities    *HCAP without sepsis  -Possible imaging is showing pneumonia from [facility] that has not reached radiographic resolution yet rather than new pneumia  -source evaluation ongoing with blood culture no growth to date, sputum culture ordered  -CT CHEST: L> R pleural effusions with interstitial edema due to mild to moderate CHF. Small pericardial effusion.  Patchy bilateral tree-in-bud airspace opacity likely reflects superimposed multilobar pneumonia. Follow up to resolution.  - dosed with cefepime and Vanco x 2 days  -ID consult: appreciate input--> doubt PNA, no abx   - blood cx 2/25 no growth to date    *PAF (paroxysmal atrial fibrillation) on chronic anticoagulation with supratherapeutic INR  2/27 report of brief episode AFIB w/ while in HD unit (was not being dialyzed yet)  - amiodarone 400mg every 8 hrs initiated  - con't metoprolol / telemonitor  - CHADS2=3. Coumadin on hold due to elevated INR.  - INR 4, holding daily dosing      carotid artery disease  - non-obstructive carotid artery disease by MRA of the neck in April 2017 he has  50-60% left ICA stenosis  - con't statin / BB / asa / plavix    Anemia  Epogen with HD. Guaiac negative 02/18  - to be transfused 1 unit PRBC's today during HD      ----------------------------------------------------------------------------------------------  CHIEF COMPLAINT:  volume overload    SUBJECTIVE:     tolerating PO. OOB. pt still impressed with Dr Jones's handling of his episode in HD yesterday    REVIEW OF SYSTEMS:  All other review of systems is negative unless indicated above    Vital Signs Last 24 Hrs  T(C): 36.2 (28 Feb 2018 12:00), Max: 37.3 (27 Feb 2018 23:23)  T(F): 97.2 (28 Feb 2018 12:00), Max: 99.1 (27 Feb 2018 23:23)  HR: 86 (28 Feb 2018 18:00) (71 - 86)  BP: 105/57 (28 Feb 2018 18:00) (90/30 - 121/58)  BP(mean): 69 (28 Feb 2018 18:00) (45 - 72)  RR: 21 (28 Feb 2018 07:00) (21 - 25)  SpO2: 99% (28 Feb 2018 18:00) (90% - 100%)  CAPILLARY BLOOD GLUCOSE          PHYSICAL EXAM:  Constitutional: NAD, NCAT  HEENT: EOMI, Normal Hearing, MMM, fair dentition  Neck: trachea midline, No JVD  Respiratory: Breath sounds are clear, unlabored respiration  Cardiovascular: S1 and S2, regular rate   Gastrointestinal: Bowel Sounds present, soft, nontender, nondistended  Extremities: No peripheral edema  Vascular: 2+ radial pulse  Neurological: awake, alert, follows commands, sensation grossly intact  Psych: appropriate affect,  insight  Musculoskeletal: moves all extremities  Skin: No rashes    ALLERGIES  heparin (Other)      MEDICATIONS  (STANDING):  amiodarone    Tablet 400 milliGRAM(s) Oral every 8 hours  aspirin  chewable 81 milliGRAM(s) Oral daily  atorvastatin 80 milliGRAM(s) Oral at bedtime  clopidogrel Tablet 75 milliGRAM(s) Oral daily  latanoprost 0.005% Ophthalmic Solution 1 Drop(s) Both EYES at bedtime  lidocaine   Patch 1 Patch Transdermal daily  lidocaine   Patch 1 Patch Transdermal daily  lisinopril 5 milliGRAM(s) Oral daily  melatonin 3 milliGRAM(s) Oral at bedtime  metoprolol     tartrate 50 milliGRAM(s) Oral every 12 hours  sevelamer hydrochloride 800 milliGRAM(s) Oral three times a day with meals      LABS: All Labs Reviewed:                        8.6    7.5   )-----------( 270      ( 28 Feb 2018 08:56 )             26.2     02-28    136  |  100  |  44<H>  ----------------------------<  95  4.4   |  28  |  5.28<H>    Ca    8.2<L>      28 Feb 2018 08:56    TPro  6.5  /  Alb  2.1<L>  /  TBili  0.4  /  DBili  x   /  AST  25  /  ALT  22  /  AlkPhos  66  02-27    PT/INR - ( 28 Feb 2018 11:17 )   PT: 21.0 sec;   INR: 1.92 ratio         PTT - ( 28 Feb 2018 11:17 )  PTT:35.5 sec      Blood Culture: 02-25 @ 06:37  Organism --  Gram Stain Blood -- Gram Stain --  Specimen Source .Blood Blood-Peripheral  Culture-Blood --

## 2018-02-28 NOTE — PROGRESS NOTE ADULT - SUBJECTIVE AND OBJECTIVE BOX
Patient is a 77y Male who reports no complaints overnight. In step down, hr ok for now    REVIEW OF SYSTEMS:    CONSTITUTIONAL: stable weakness, no fevers or chills  RESPIRATORY: No cough, wheezing, hemoptysis; No shortness of breath  CARDIOVASCULAR: No chest pain or palpitations  GENITOURINARY: No dysuria, frequency or hematuria  All other review of systems is negative unless indicated above.    MEDICATIONS  (STANDING):  amiodarone    Tablet 400 milliGRAM(s) Oral every 8 hours  aspirin  chewable 81 milliGRAM(s) Oral daily  atorvastatin 80 milliGRAM(s) Oral at bedtime  clopidogrel Tablet 75 milliGRAM(s) Oral daily  latanoprost 0.005% Ophthalmic Solution 1 Drop(s) Both EYES at bedtime  lidocaine   Patch 1 Patch Transdermal daily  lidocaine   Patch 1 Patch Transdermal daily  lisinopril 5 milliGRAM(s) Oral daily  melatonin 3 milliGRAM(s) Oral at bedtime  metoprolol     tartrate 50 milliGRAM(s) Oral every 12 hours  sevelamer hydrochloride 800 milliGRAM(s) Oral three times a day with meals    MEDICATIONS  (PRN):  acetaminophen   Tablet. 650 milliGRAM(s) Oral every 6 hours PRN Mild Pain (1 - 3)  diazepam    Tablet 5 milliGRAM(s) Oral two times a day PRN anxiety/agitation        T(C): , Max: 37.3 (02-27-18 @ 23:23)  T(F): , Max: 99.1 (02-27-18 @ 23:23)  HR: 75 (02-28-18 @ 10:00)  BP: 104/49 (02-28-18 @ 10:00)  BP(mean): 63 (02-28-18 @ 10:00)  RR: 21 (02-28-18 @ 07:00)  SpO2: 100% (02-28-18 @ 10:00)  Wt(kg): --    02-27 @ 07:01  -  02-28 @ 07:00  --------------------------------------------------------  IN: 332 mL / OUT: 2 mL / NET: 330 mL          PHYSICAL EXAM:    Constitutional: frail  HEENT: PERRLA, EOMI,  MMM  Neck: No LAD, No JVD  Respiratory dec bases  Cardiovascular: S1 and S2, RRR  Gastrointestinal: BS+, soft, NT/ND  Extremities: No peripheral edema  Neurological: A/O x 3, no focal deficits  Psychiatric: Normal mood, normal affect  : No Vallecillo  Skin: No rashes  Access: nasir cath        LABS:                        8.6    7.5   )-----------( 270      ( 28 Feb 2018 08:56 )             26.2     28 Feb 2018 08:56    136    |  100    |  44     ----------------------------<  95     4.4     |  28     |  5.28   27 Feb 2018 07:03    134    |  99     |  79     ----------------------------<  97     4.1     |  20     |  8.26   26 Feb 2018 06:47    133    |  98     |  63     ----------------------------<  87     4.2     |  24     |  6.68   25 Feb 2018 05:23    136    |  99     |  38     ----------------------------<  91     4.3     |  28     |  4.83     Ca    8.2        28 Feb 2018 08:56  Ca    8.4        27 Feb 2018 07:03  Ca    8.6        26 Feb 2018 06:47  Ca    8.6        25 Feb 2018 05:23  Phos  3.5       26 Feb 2018 06:47    TPro  6.5    /  Alb  2.1    /  TBili  0.4    /  DBili  x      /  AST  25     /  ALT  22     /  AlkPhos  66     27 Feb 2018 07:03  TPro  7.1    /  Alb  2.2    /  TBili  0.5    /  DBili  x      /  AST  29     /  ALT  28     /  AlkPhos  71     26 Feb 2018 06:47  TPro  7.4    /  Alb  2.4    /  TBili  0.6    /  DBili  x      /  AST  27     /  ALT  27     /  AlkPhos  76     25 Feb 2018 05:23          Urine Studies:          RADIOLOGY & ADDITIONAL STUDIES:

## 2018-02-28 NOTE — PROGRESS NOTE ADULT - ASSESSMENT
77 with recent EVAAR on 1/29/18 [facility], hx of Left Nephrectomy for renal cancer 2008, Bladder Cancer, re admitted on Feb. 4, 2018 for Acute Systolic CHF requiring HD in addition to Bipap, Afib on coumadin, HTN, HLD, CAD occlusion of Right renal artery  after EVAAR presents from [facility] after fall.     ESRD  -HD T/T/S, treatment. Complete treatment next in AM  -K protocol  -UF as tolerated, difficult to optimize volume at this point with tachycardia, soft bp  -Eventual AVF with [facility]?, they had refused prior to d/c from [facility]  -At families request will get renal doppler, reimage renal artery that is known to be obstructed    Renal mass  -Was known from last hospital stay with [facility] to have mass on now non functional R kidney. Discussed with patient and son, they follow with Dr. Calderon  -IF remains ESRD with lost renal artery, nephrectomy would be only reasonable option. Will need  evaluation    Anemia  -prbc x 1 with HD on 2/27  -Potential for JANNA when malignacy issues clarified, would hold off for now  -May need PRBC again with HD on 3/1    d/c with family at length  d/c with staff

## 2018-02-28 NOTE — PROGRESS NOTE ADULT - ASSESSMENT
Status post fall on the day of admission.  Chronic systolic/diastolic congestive heart failure.  Paroxysmal atrial fibrillation. He is now in normal sinus rhythm.  Left pleural effusion.  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  Coronary artery disease status post multiple stents January 2018.  Aortic abdominal aneurysm status post endovascular repair.  Hypertension.  End-stage renal disease he is on hemodialysis.  Renal and bladder cancer in the past status post surgery.  Anemia.  ESRD.  Suggest  Observe on telemetry.  Continue with current cardiac medications. Day 2 of Amiodarone loading.  Continue with other cardiac meds.  Follow-up with electrolytes.  He may require blood transfusion.  Adjust INR 2.0  Follow low sodium low cholesterol diet.  Adjust INR approximately 2.0.  Follow up echo in a few days to evaluate pericardial effusion.  Antibiotics as per hospitalist.  Discussed  with patient's daughter.  Discussed with the hospitalist and pulmonologist  & nephrologist.

## 2018-02-28 NOTE — PROGRESS NOTE ADULT - SUBJECTIVE AND OBJECTIVE BOX
HPI:  Patient is 77-year-old he has a history of hypertension, high cholesterol, coronary artery disease, chronic left bundle branch block, history of bladder cancer status post left nephrectomy in the past, carotid artery disease he has nonobstructive carotid artery disease by MRA of the neck in April 2017 he has  50-60% left ICA stenosis, aortic abdominal aneurysm and he status post endovascular repair in January 2018, his  postoperative course was complicated by development of congestive heart failure and his echocardiogram has revealed left ventricular ejection fraction of approximately 25%, LVEF now 45%,  he status post cardiac catheterization and he status post stent placement to proximal LAD, diagonal 2 and 1 circumflex. He had  drug-eluting stent placed. He  now has end-stage renal disease and requires hemodialysis. He was also treated for pneumonia & paroxysmal atrial fibrillation. He also has occluded right renal artery but stent could not be inserted.  . After admission patient has episodes of atrial fibrillation with rapid ventricular response.   Yesterday during dialysis, he had episodes of atrial fibrillation with rapid ventricular response and he was symptomatic with this episode. Since his blood pressure has been marginal, he was started on amiodarone. He now continues to be normal sinus rhythm. He had become confused last night, but now he is alert and oriented. He denies any chest pain or shortness of breath. He complains of generalized weakness. He is afebrile.     Transthoracic Echocardiogram (02.26.18   Summary     Endocardium is not always well visualized, however, overall left   ventricular systolic functionappears mildly decreased, with mild   hypokinesis of the anterolateral wall. Paradoxical septal motion is also   present. Estimated left ventricular ejection fraction is 45 %.   Significant fibrocalcific changes noted to the aortic valve leaflets with   restriction in leaflet excursion. Peak transaortic gradient is 15 mmHg;   this finding is consistent with mild aortic stenosis.   Trace aortic regurgitation is present.   The mitral valve leaflets appear thickened.   Moderate mitral annular calcification is present.   Trace mitral regurgitation is present.   EA reversal of the mitral inflow consistent with reduced compliance of   the   left ventricle.   A mild to moderate sized pericardial effusion is present.             CT Chest No Cont (02.25.18   CT THORAX      IMPRESSION:     CT CHEST: Left greater than right pleural effusions with interstitial   edema due to mild to moderate CHF. Small pericardial effusion.    Patchy bilateral tree-in-bud airspace opacity likely reflects   superimposed multilobar pneumonia. Follow up to resolution.    CT ABDOMEN/PELVIS: No solid organ or bowel injury nor skeletal trauma in   the abdomen and pelvis.    Aortobiiliac stent graft repair. Please note that graft integrity and   vascular structures cannot be evaluated on noncontrast study.        PAST MEDICAL & SURGICAL HISTORY:  CAD (coronary artery disease)  History of heart artery stent: DIONICIO  Presence of stent in LAD coronary artery  History of nephrectomy: Left  S/P AAA repair  MEDICATIONS  (STANDING):  amiodarone    Tablet 400 milliGRAM(s) Oral every 8 hours  aspirin  chewable 81 milliGRAM(s) Oral daily  atorvastatin 80 milliGRAM(s) Oral at bedtime  clopidogrel Tablet 75 milliGRAM(s) Oral daily  latanoprost 0.005% Ophthalmic Solution 1 Drop(s) Both EYES at bedtime  lidocaine   Patch 1 Patch Transdermal daily  lidocaine   Patch 1 Patch Transdermal daily  lisinopril 5 milliGRAM(s) Oral daily  melatonin 3 milliGRAM(s) Oral at bedtime  metoprolol     tartrate 50 milliGRAM(s) Oral every 12 hours  sevelamer hydrochloride 800 milliGRAM(s) Oral three times a day with meals    MEDICATIONS  (PRN):  acetaminophen   Tablet. 650 milliGRAM(s) Oral every 6 hours PRN Mild Pain (1 - 3)        REVIEW OF SYSTEM:  Pertinent items are noted in HPI.  Constitutional	Negative for chills, fevers, sweats.    Eyes: 	Negative for visual disturbance.  Ears, nose, mouth, throat, and face: Negative for epistaxis, nasal congestion, sore throat .  Neck:	Negative for enlargement, pain and difficulty in swallowing  Respiration : Negative for cough, pleuritic chest pain and wheezing  Cardiovascular: Negative for chest pain and palpitations    Gastrointestinal : Negative for abdominal pain, diarrhea, nausea and vomiting  Genitourinary: Negative for dysuria, frequency and urinary incontinence .  Skin: Negative for  rash, pruritus, swelling, dryness .  	  Hematologic/lymphatic: Negative for bleeding and easy bruising  Musculoskeletal: Negative for arthralgias, back pain and muscle weakness.  Neurological: Negative for dizziness, headaches, seizures and tremors  Endocrine:	Negative for blurry vision, polydipsia and polyuria, diaphoresis.   Allergic/Immunologic:	Negative for anaphylaxis, angioedema and urticaria.      Vital Signs Last 24 Hrs  T(C): 36.2 (28 Feb 2018 05:24), Max: 37.3 (27 Feb 2018 23:23)  T(F): 97.1 (28 Feb 2018 05:24), Max: 99.1 (27 Feb 2018 23:23)  HR: 71 (28 Feb 2018 04:00) (71 - 142)  BP: 100/51 (28 Feb 2018 04:00) (87/53 - 126/61)  BP(mean): 62 (28 Feb 2018 04:00) (45 - 76)  RR: 21 (28 Feb 2018 04:00) (16 - 30)  SpO2: 99% (28 Feb 2018 04:00) (93% - 99%)        PHYSICAL EXAM  General Appearance: cooperative, no acute distress,   HEENT: PERRL,  pale conjunctiva clear, EOM's intact, non injected pharynx, no exudate .  Neck: Supple, , no adenopathy, thyroid: not enlarged, no carotid bruit or JVD  Lungs: Diminished breath sounds on both bases, no rales or any rhonchi's.  Heart: Regular rate and rhythm, S1, S2 normal, no murmur, rub or gallop  Abdomen: Soft, non-tender, bowel sounds active , no hepatosplenomegaly  Extremities: no cyanosis or edema, no joint swelling  Skin: Skin color, texture normal, no rashes   Neurologic: Alert and oriented X3 , cranial nerves intact, sensory and motor normal,        INTERPRETATION OF TELEMETRY: NSR APCs            LABS:                          7.9    8.8   )-----------( 299      ( 27 Feb 2018 07:03 )             24.4     02-27    134<L>  |  99  |  79<H>  ----------------------------<  97  4.1   |  20<L>  |  8.26<H>    Ca    8.4<L>      27 Feb 2018 07:03    TPro  6.5  /  Alb  2.1<L>  /  TBili  0.4  /  DBili  x   /  AST  25  /  ALT  22  /  AlkPhos  66  02-27

## 2018-02-28 NOTE — PROGRESS NOTE ADULT - ASSESSMENT
1) Acute Decompensated Heart Failure  2) Abnormal CT Chest  3) Pulmonary Edema  4) Bilateral Pleural Effusions  5) ESRD  6) Dyspnea    2/28/18    76 y/o M hx AAA 5.5 cm s/p EVAAR repair with stents 0n 1/29/18 [facility] with incidental finding of Right renal neoplasm, s/p Left Nephrectomy for renal cancer 2008, Bladder Cancer, LBBB, admitted on Feb. 4, 2018 for ADHF due to Acute Systolic CHF-Echo 2/18/18 EF ~25%,  Discussed findings of CT chest/pleural effusions with family  Etiology is likely secondary to heart failure/volume overload from ESRD  Agree with Cardiology/Nephrology recommendations  ABX d/c 2/26  Blood cultures NGTD  Continue HD per renal as pericardial/pleural effusions are uremic in nature  Goal SaO2 >88%  PT/OT  Thank you for the consult  Will continue to monitor

## 2018-03-01 LAB
ALBUMIN SERPL ELPH-MCNC: 2.1 G/DL — LOW (ref 3.3–5)
ANION GAP SERPL CALC-SCNC: 7 MMOL/L — SIGNIFICANT CHANGE UP (ref 5–17)
APTT BLD: 33.4 SEC — SIGNIFICANT CHANGE UP (ref 27.5–37.4)
BUN SERPL-MCNC: 66 MG/DL — HIGH (ref 7–23)
CALCIUM SERPL-MCNC: 8.5 MG/DL — SIGNIFICANT CHANGE UP (ref 8.5–10.1)
CHLORIDE SERPL-SCNC: 100 MMOL/L — SIGNIFICANT CHANGE UP (ref 96–108)
CO2 SERPL-SCNC: 26 MMOL/L — SIGNIFICANT CHANGE UP (ref 22–31)
CREAT SERPL-MCNC: 7.29 MG/DL — HIGH (ref 0.5–1.3)
GLUCOSE SERPL-MCNC: 113 MG/DL — HIGH (ref 70–99)
HCT VFR BLD CALC: 26.6 % — LOW (ref 39–50)
HGB BLD-MCNC: 8.5 G/DL — LOW (ref 13–17)
INR BLD: 1.78 RATIO — HIGH (ref 0.88–1.16)
MCHC RBC-ENTMCNC: 29.6 PG — SIGNIFICANT CHANGE UP (ref 27–34)
MCHC RBC-ENTMCNC: 32 GM/DL — SIGNIFICANT CHANGE UP (ref 32–36)
MCV RBC AUTO: 92.5 FL — SIGNIFICANT CHANGE UP (ref 80–100)
PHOSPHATE SERPL-MCNC: 3.5 MG/DL — SIGNIFICANT CHANGE UP (ref 2.5–4.5)
PLATELET # BLD AUTO: 324 K/UL — SIGNIFICANT CHANGE UP (ref 150–400)
POTASSIUM SERPL-MCNC: 4.6 MMOL/L — SIGNIFICANT CHANGE UP (ref 3.5–5.3)
POTASSIUM SERPL-SCNC: 4.6 MMOL/L — SIGNIFICANT CHANGE UP (ref 3.5–5.3)
PROTHROM AB SERPL-ACNC: 19.4 SEC — HIGH (ref 9.8–12.7)
RBC # BLD: 2.88 M/UL — LOW (ref 4.2–5.8)
RBC # FLD: 14.2 % — SIGNIFICANT CHANGE UP (ref 10.3–14.5)
SODIUM SERPL-SCNC: 133 MMOL/L — LOW (ref 135–145)
WBC # BLD: 8.7 K/UL — SIGNIFICANT CHANGE UP (ref 3.8–10.5)
WBC # FLD AUTO: 8.7 K/UL — SIGNIFICANT CHANGE UP (ref 3.8–10.5)

## 2018-03-01 RX ORDER — AMIODARONE HYDROCHLORIDE 400 MG/1
400 TABLET ORAL
Qty: 0 | Refills: 0 | Status: DISCONTINUED | OUTPATIENT
Start: 2018-03-01 | End: 2018-03-03

## 2018-03-01 RX ORDER — WARFARIN SODIUM 2.5 MG/1
3 TABLET ORAL DAILY
Qty: 0 | Refills: 0 | Status: COMPLETED | OUTPATIENT
Start: 2018-03-01 | End: 2018-03-10

## 2018-03-01 RX ADMIN — Medication 3 MILLIGRAM(S): at 21:29

## 2018-03-01 RX ADMIN — Medication 50 MILLIGRAM(S): at 17:42

## 2018-03-01 RX ADMIN — SEVELAMER CARBONATE 800 MILLIGRAM(S): 2400 POWDER, FOR SUSPENSION ORAL at 17:41

## 2018-03-01 RX ADMIN — AMIODARONE HYDROCHLORIDE 400 MILLIGRAM(S): 400 TABLET ORAL at 17:42

## 2018-03-01 RX ADMIN — ATORVASTATIN CALCIUM 80 MILLIGRAM(S): 80 TABLET, FILM COATED ORAL at 21:29

## 2018-03-01 RX ADMIN — CLOPIDOGREL BISULFATE 75 MILLIGRAM(S): 75 TABLET, FILM COATED ORAL at 15:25

## 2018-03-01 RX ADMIN — AMIODARONE HYDROCHLORIDE 400 MILLIGRAM(S): 400 TABLET ORAL at 05:28

## 2018-03-01 RX ADMIN — Medication 5 MILLIGRAM(S): at 16:59

## 2018-03-01 RX ADMIN — Medication 81 MILLIGRAM(S): at 15:25

## 2018-03-01 RX ADMIN — LIDOCAINE 1 PATCH: 4 CREAM TOPICAL at 15:26

## 2018-03-01 RX ADMIN — SEVELAMER CARBONATE 800 MILLIGRAM(S): 2400 POWDER, FOR SUSPENSION ORAL at 10:48

## 2018-03-01 RX ADMIN — WARFARIN SODIUM 3 MILLIGRAM(S): 2.5 TABLET ORAL at 21:29

## 2018-03-01 RX ADMIN — LISINOPRIL 5 MILLIGRAM(S): 2.5 TABLET ORAL at 05:28

## 2018-03-01 RX ADMIN — LATANOPROST 1 DROP(S): 0.05 SOLUTION/ DROPS OPHTHALMIC; TOPICAL at 22:17

## 2018-03-01 RX ADMIN — Medication 50 MILLIGRAM(S): at 05:28

## 2018-03-01 RX ADMIN — Medication 5 MILLIGRAM(S): at 06:31

## 2018-03-01 NOTE — PROGRESS NOTE ADULT - SUBJECTIVE AND OBJECTIVE BOX
Patient is a 77y old  Male who presents with a chief complaint of Fall and left leg and chest wall pain (25 Feb 2018 09:47)      HPI:  78 y/o M hx AAA 5.5 cm s/p EVAAR repair with stents 0n 1/29/18 [facility] with incidental finding of Right renal neoplasm, s/p Left Nephrectomy for renal cancer 2008, Bladder Cancer, LBBB, admitted on Feb. 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 presents from [facility] after fall. Pt reports he was walking from bed to close his door around 3:00 AM and did not use his walker and fell. He denies hitting head or LOC. Only c/o pain to Left chest wall. Denies CP/SOB, light headedness. Reports he only wants XR and refusing blood work on arrival.   Patient was seen with daughter at bedside and sister. Patient denies nausea, vomiting, bleeding or dysuria. Patient has been making some urine now. No diarrhea or abdominal pain or headache. Some cough. (25 Feb 2018 09:47)    2/28/18  Transferred to Cardiac StepDown overnight    3/1/18  No acute events occurred  Patient getting abdominal u/s      PAST MEDICAL & SURGICAL HISTORY:  CAD (coronary artery disease)  History of heart artery stent: DIONICIO  Presence of stent in LAD coronary artery  History of nephrectomy: Left  S/P AAA repair      PREVIOUS DIAGNOSTIC TESTING:      MEDICATIONS  (STANDING):  aspirin  chewable 81 milliGRAM(s) Oral daily  atorvastatin 80 milliGRAM(s) Oral at bedtime  clopidogrel Tablet 75 milliGRAM(s) Oral daily  latanoprost 0.005% Ophthalmic Solution 1 Drop(s) Both EYES at bedtime  lidocaine   Patch 1 Patch Transdermal daily  lidocaine   Patch 1 Patch Transdermal daily  lisinopril 5 milliGRAM(s) Oral daily  melatonin 3 milliGRAM(s) Oral at bedtime  metoprolol     tartrate 50 milliGRAM(s) Oral every 12 hours  sevelamer hydrochloride 800 milliGRAM(s) Oral three times a day with meals    MEDICATIONS  (PRN):  acetaminophen   Tablet. 650 milliGRAM(s) Oral every 6 hours PRN Mild Pain (1 - 3)        REVIEW OF SYSTEM:  Dyspnea, fatigue, otherwise 12 point ROS Negative     Vital Signs Last 24 Hrs  T(C): 36.9 (01 Mar 2018 10:00), Max: 36.9 (01 Mar 2018 10:00)  T(F): 98.5 (01 Mar 2018 10:00), Max: 98.5 (01 Mar 2018 10:00)  HR: 77 (01 Mar 2018 10:00) (71 - 86)  BP: 128/68 (01 Mar 2018 10:00) (101/53 - 130/59)  BP(mean): 75 (01 Mar 2018 08:00) (65 - 76)  RR: 16 (01 Mar 2018 10:00) (15 - 16)  SpO2: 94% (01 Mar 2018 10:00) (90% - 99%)    I&O's Summary    PHYSICAL EXAM  General Appearance: cooperative, no acute distress,   HEENT: PERRL, conjunctiva clear, EOM's intact, non injected pharynx, no exudate, TM   normal  Neck: Supple, , no adenopathy, thyroid: not enlarged, no carotid bruit or JVD  Back: Symmetric, no  tenderness,no soft tissue tenderness  Lungs: Coarse bilaterally   Heart: Regular rate and rhythm, S1, S2 normal, no murmur, rub or gallop  Abdomen: Soft, non-tender, bowel sounds active , no hepatosplenomegaly  Extremities: no cyanosis or edema, no joint swelling  Skin: Skin color, texture normal, no rashes   Neurologic: Alert and oriented X3 , cranial nerves intact, sensory and motor normal,    ECG:    LABS:                          8.7    8.9   )-----------( 301      ( 26 Feb 2018 06:47 )             26.8     02-26    133<L>  |  98  |  63<H>  ----------------------------<  87  4.2   |  24  |  6.68<H>    Ca    8.6      26 Feb 2018 06:47  Phos  3.5     02-26    TPro  7.1  /  Alb  2.2<L>  /  TBili  0.5  /  DBili  x   /  AST  29  /  ALT  28  /  AlkPhos  71  02-26            PT/INR - ( 26 Feb 2018 06:47 )   PT: 45.0 sec;   INR: 4.05 ratio         PTT - ( 25 Feb 2018 05:23 )  PTT:39.3 sec          RADIOLOGY & ADDITIONAL STUDIES:

## 2018-03-01 NOTE — PROGRESS NOTE ADULT - ASSESSMENT
1) Acute Decompensated Heart Failure  2) Abnormal CT Chest  3) Pulmonary Edema  4) Bilateral Pleural Effusions  5) ESRD  6) Dyspnea    3/1/18  76 y/o M hx AAA 5.5 cm s/p EVAAR repair with stents 0n 1/29/18 [facility] with incidental finding of Right renal neoplasm, s/p Left Nephrectomy for renal cancer 2008, Bladder Cancer, LBBB, admitted on Feb. 4, 2018 for ADHF due to Acute Systolic CHF-Echo 2/18/18 EF ~25%,  Discussed findings of CT chest/pleural effusions with family  Etiology is likely secondary to heart failure/volume overload from ESRD  Agree with Cardiology/Nephrology recommendations  ABX d/c 2/26  Blood cultures NGTD  Continue HD per renal as pericardial/pleural effusions are uremic in nature  Goal SaO2 >88%  PT/OT  Thank you for the consult  Will continue to monitor

## 2018-03-01 NOTE — PROGRESS NOTE ADULT - ASSESSMENT
77 with recent EVAAR on 1/29/18 [facility], hx of Left Nephrectomy for renal cancer 2008, Bladder Cancer, re admitted on Feb. 4, 2018 for Acute Systolic CHF requiring HD in addition to Bipap, Afib on coumadin, HTN, HLD, CAD occlusion of Right renal artery  after EVAAR presents from [facility] after fall.     DONNIE on CKD - after recent occluded renal artery EVAAR  vs plaque emboli    currently HD dependent  -  started HD in early February due to above event  - baseline Cr unknown   - continue HD T/T/S, treatment. Complete treatment today as ordered for clearance and volume recovery  - K protocol  - UF as tolerated  optimize volume - Uf goal 1.3 Liters today as BP allows   - pericardial effusion one echo - likely uremic /fluid overload related      Occluded renal artery   renal doppler shows some patency and flow renal artery is patent but the waveform is suggestive of some   segmental stenosis. There is an aneurysm in the distal abdominal aorta with mural thrombosis.  - avoid nephrotox agents for now   - hold ACE and ARB for now   - No NSAID's  - daughter is seeking second opnion at tertiary care at [facility] for possible renal artery intervention thought renal prognosis is not good if this is due to plaque emboli - advised daughter of this     Known Renal mass - not seen on doppler but limited views with multiple cysts   - per daughter mass on affected  R kidney and they follow with Dr. Calderon   - IF remains HD dependent and ESRD with lost renal funciton, then potential nephrectomy would be only reasonable option. Will need  for final decision on this     Anemia  - prbc x 1 with HD on 2/27  - JANNA when malignacy issues clarified,  hold off for now  - monitor Hb - transfuse if Hb < 8     d/w daughter at length Jennifer Warren   d/c with staff

## 2018-03-01 NOTE — PROGRESS NOTE ADULT - ASSESSMENT
A/P    #Acute decompensated systolic heart failure   -Maximal removal of fluids during HD as possible   -supportive care     #Afib -on amiodarone   -subtherpaeutic inr- start coumadin back   -CHADS2-3- no need to bridge     #h/o Occluded right renal artery - doppler study to follow     #Fall- PT and supportive care     #Right renal mass- out pt management     #dvt pr -on coumadin     #discharge plan

## 2018-03-01 NOTE — PROGRESS NOTE ADULT - SUBJECTIVE AND OBJECTIVE BOX
Patient is a 77y Male who reports no complaints overnight. In step down, hr ok for now      seen on HD  's   pt iiriritaed and confused   no sob or cp   + cough      d/w daughter in detail via phone     REVIEW OF SYSTEMS:    CONSTITUTIONAL: stable weakness, no fevers or chills  RESPIRATORY: No cough, wheezing, hemoptysis; No shortness of breath  CARDIOVASCULAR: No chest pain or palpitations  GENITOURINARY: No dysuria, frequency or hematuria  All other review of systems is negative unless indicated above.    MEDICATIONS  (STANDING):  amiodarone    Tablet 400 milliGRAM(s) Oral every 8 hours  aspirin  chewable 81 milliGRAM(s) Oral daily  atorvastatin 80 milliGRAM(s) Oral at bedtime  clopidogrel Tablet 75 milliGRAM(s) Oral daily  latanoprost 0.005% Ophthalmic Solution 1 Drop(s) Both EYES at bedtime  lidocaine   Patch 1 Patch Transdermal daily  lidocaine   Patch 1 Patch Transdermal daily  lisinopril 5 milliGRAM(s) Oral daily  melatonin 3 milliGRAM(s) Oral at bedtime  metoprolol     tartrate 50 milliGRAM(s) Oral every 12 hours  sevelamer hydrochloride 800 milliGRAM(s) Oral three times a day with meals    MEDICATIONS  (PRN):  acetaminophen   Tablet. 650 milliGRAM(s) Oral every 6 hours PRN Mild Pain (1 - 3)  diazepam    Tablet 5 milliGRAM(s) Oral two times a day PRN anxiety/agitation      Vital Signs Last 24 Hrs  T(C): 36.1 (01 Mar 2018 12:01), Max: 36.9 (01 Mar 2018 10:00)  T(F): 97 (01 Mar 2018 12:01), Max: 98.5 (01 Mar 2018 10:00)  HR: 89 (01 Mar 2018 14:53) (71 - 91)  BP: 123/62 (01 Mar 2018 14:53) (101/53 - 130/59)  BP(mean): 75 (01 Mar 2018 08:00) (65 - 76)  RR: 16 (01 Mar 2018 14:53) (15 - 17)  SpO2: 94% (01 Mar 2018 10:00) (90% - 99%)        PHYSICAL EXAM:    Constitutional: frail  HEENT: PERRLA, EOMI,  MMM  Neck: No LAD, No JVD  Respiratory dec bases  Cardiovascular: S1 and S2, RRR  Gastrointestinal: BS+, soft, NT/ND  Extremities: No peripheral edema  Neurological: A/O x 3, no focal deficits  Psychiatric: Normal mood, normal affect  : No Vallecillo  Skin: No rashes  Access: nasir cath        LABS:                                      8.5    8.7   )-----------( 324      ( 01 Mar 2018 06:40 )             26.6                         8.6    7.5   )-----------( 270      ( 28 Feb 2018 08:56 )             26.2     133    |  100    |  66     ----------------------------<  113       01 Mar 2018 11:30  4.6     |  26     |  7.29     136    |  100    |  44     ----------------------------<  95        28 Feb 2018 08:56  4.4     |  28     |  5.28     134    |  99     |  79     ----------------------------<  97        27 Feb 2018 07:03  4.1     |  20     |  8.26     Ca    8.5        01 Mar 2018 11:30  Ca    8.2        28 Feb 2018 08:56    Phos  3.5       01 Mar 2018 11:30  Phos  3.5       26 Feb 2018 06:47          Urine Studies:          RADIOLOGY & ADDITIONAL STUDIES:    he patient is status post left nephrectomy.    No evidence of hydronephrosis involving the right kidney.    The right renal artery is patent but the waveform is suggestive of some   segmental stenosis.  There is an aneurysm in the distal abdominal aorta with mural thrombosis.

## 2018-03-01 NOTE — PROGRESS NOTE ADULT - SUBJECTIVE AND OBJECTIVE BOX
HPI:  Patient is 77-year-old he has a history of hypertension, high cholesterol, coronary artery disease, chronic left bundle branch block, history of bladder cancer status post left nephrectomy in the past, carotid artery disease he has nonobstructive carotid artery disease by MRA of the neck in April 2017 he has  50-60% left ICA stenosis, aortic abdominal aneurysm and he status post endovascular repair in January 2018, his  postoperative course was complicated by development of congestive heart failure and his echocardiogram has revealed left ventricular ejection fraction of approximately 25%, LVEF now 45%,  he status post cardiac catheterization and he status post stent placement to proximal LAD, diagonal 2 and 1 circumflex. He had  drug-eluting stent placed. He  now has end-stage renal disease and requires hemodialysis. He was also treated for pneumonia & paroxysmal atrial fibrillation. He also has occluded right renal artery but stent could not be inserted.  . After admission patient has episodes of atrial fibrillation with rapid ventricular response.   No further episodes of atrial fibrillation. He feels better and is in normal sinus rhythm. He denies any chest pain or shortness of breath. Today's date 3 off amiodarone loading.     Transthoracic Echocardiogram (02.26.18   Summary     Endocardium is not always well visualized, however, overall left   ventricular systolic functionappears mildly decreased, with mild   hypokinesis of the anterolateral wall. Paradoxical septal motion is also   present. Estimated left ventricular ejection fraction is 45 %.   Significant fibrocalcific changes noted to the aortic valve leaflets with   restriction in leaflet excursion. Peak transaortic gradient is 15 mmHg;   this finding is consistent with mild aortic stenosis.   Trace aortic regurgitation is present.   The mitral valve leaflets appear thickened.   Moderate mitral annular calcification is present.   Trace mitral regurgitation is present.   EA reversal of the mitral inflow consistent with reduced compliance of   the   left ventricle.   A mild to moderate sized pericardial effusion is present.             CT Chest No Cont (02.25.18   CT THORAX      IMPRESSION:     CT CHEST: Left greater than right pleural effusions with interstitial   edema due to mild to moderate CHF. Small pericardial effusion.    Patchy bilateral tree-in-bud airspace opacity likely reflects   superimposed multilobar pneumonia. Follow up to resolution.    CT ABDOMEN/PELVIS: No solid organ or bowel injury nor skeletal trauma in   the abdomen and pelvis.    Aortobiiliac stent graft repair. Please note that graft integrity and   vascular structures cannot be evaluated on noncontrast study.      MEDICATIONS  (STANDING):  amiodarone    Tablet 400 milliGRAM(s) Oral two times a day  aspirin  chewable 81 milliGRAM(s) Oral daily  atorvastatin 80 milliGRAM(s) Oral at bedtime  clopidogrel Tablet 75 milliGRAM(s) Oral daily  latanoprost 0.005% Ophthalmic Solution 1 Drop(s) Both EYES at bedtime  lidocaine   Patch 1 Patch Transdermal daily  lidocaine   Patch 1 Patch Transdermal daily  lisinopril 5 milliGRAM(s) Oral daily  melatonin 3 milliGRAM(s) Oral at bedtime  metoprolol     tartrate 50 milliGRAM(s) Oral every 12 hours  sevelamer hydrochloride 800 milliGRAM(s) Oral three times a day with meals    MEDICATIONS  (PRN):  acetaminophen   Tablet. 650 milliGRAM(s) Oral every 6 hours PRN Mild Pain (1 - 3)  diazepam    Tablet 5 milliGRAM(s) Oral two times a day PRN anxiety/agitation  docusate sodium 100 milliGRAM(s) Oral two times a day PRN Constipation        REVIEW OF SYSTEM:  Pertinent items are noted in HPI.  Constitutional	Negative for chills, fevers, sweats.    Eyes: 	Negative for visual disturbance.  Ears, nose, mouth, throat, and face: Negative for epistaxis, nasal congestion, sore throat .  Neck:	Negative for enlargement, pain and difficulty in swallowing  Respiration : Negative for cough,  pleuritic chest pain and wheezing  Cardiovascular: Negative for chest pain,  and palpitations    Gastrointestinal : Negative for abdominal pain, diarrhea, nausea and vomiting  Genitourinary: Negative for dysuria, frequency and urinary incontinence .  Skin: Negative for  rash, pruritus, swelling, dryness .  	  Hematologic/lymphatic: Negative for bleeding and easy bruising  Musculoskeletal: Negative for arthralgias, back pain and muscle weakness.  Neurological: Negative for dizziness, headaches, seizures and tremors  Behavioral/Psych: Negative for mood change, depression.  Endocrine:	Negative for blurry vision, polydipsia and polyuria, diaphoresis.   Allergic/Immunologic:	Negative for anaphylaxis, angioedema and urticaria.      Vital Signs Last 24 Hrs  T(C): 36.2 (01 Mar 2018 06:09), Max: 36.2 (28 Feb 2018 12:00)  T(F): 97.1 (01 Mar 2018 06:09), Max: 97.2 (28 Feb 2018 12:00)  HR: 74 (01 Mar 2018 08:00) (71 - 86)  BP: 121/59 (01 Mar 2018 08:00) (101/53 - 130/59)  BP(mean): 75 (01 Mar 2018 08:00) (63 - 76)  RR: 15 (01 Mar 2018 08:00) (15 - 15)  SpO2: 96% (01 Mar 2018 08:00) (90% - 100%)          PHYSICAL EXAM  General Appearance: cooperative, no acute distress,   HEENT: PERRL, conjunctiva clear, EOM's intact, non injected pharynx, no exudate .  Neck: Supple, , no adenopathy, thyroid: not enlarged, no carotid bruit or JVD  Back: Symmetric, no  tenderness,no soft tissue tenderness  Lungs: Diminished breath sounds on both sides no rales or rhonchi's.  Heart: Regular rate and rhythm, S1, S2 normal, no murmur, rub or gallop  Abdomen: Soft, non-tender, bowel sounds active , no hepatosplenomegaly  Extremities: no cyanosis or edema, no joint swelling  Skin: Skin color, texture normal, no rashes   Neurologic: Alert and oriented X3 , cranial nerves intact, sensory and motor normal,        INTERPRETATION OF TELEMETRY: NSR                                       8.5    8.7   )-----------( 324      ( 01 Mar 2018 06:40 )             26.6     02-28    136  |  100  |  44<H>  ----------------------------<  95  4.4   |  28  |  5.28<H>    Ca    8.2<L>      28 Feb 2018 08:56              PT/INR - ( 01 Mar 2018 06:40 )   PT: 19.4 sec;   INR: 1.78 ratio         PTT - ( 01 Mar 2018 06:40 )  PTT:33.4 sec          RADIOLOGY & ADDITIONAL STUDIES:

## 2018-03-01 NOTE — PROGRESS NOTE ADULT - ASSESSMENT
Status post fall on the day of admission. He feels better.   Chronic systolic/diastolic congestive heart failure. He is improving.  Paroxysmal atrial fibrillation. He is now in normal sinus rhythm.  Left pleural effusion.  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  Coronary artery disease status post multiple stents January 2018.  Aortic abdominal aneurysm status post endovascular repair.  Hypertension.  End-stage renal disease he is on hemodialysis.  Renal and bladder cancer in the past status post surgery.  Anemia.  Suggest  Observe on telemetry.  Continue with current cardiac medications. Day 3 of Amiodarone loading.  Continue with other cardiac meds.  Decrease Amiodarone to 400 mg BID.  Follow-up with electrolytes.  Follow up with H/H.  Adjust INR 2.0  Follow low sodium low cholesterol diet.  Adjust INR approximately 2.0. After INR 2 will stop aspirin & continue with Plavix.  Follow up echo in a few days to evaluate pericardial effusion.  Antibiotics as per hospitalist.  Discussed  with patient's daughter Joi.  Discussed with the hospitalist and pulmonologist  & nephrologist.

## 2018-03-01 NOTE — PROGRESS NOTE ADULT - SUBJECTIVE AND OBJECTIVE BOX
HPI:  76 y/o M admitted with c/o fall and found here in er to have pneumonia with acute decompensated systolic heart failure, pleural effusion     PMH -Complicated - hx AAA 5.5 cm s/p EVAAR repair with stents 0n 1/29/18 [facility] with incidental finding of Right renal neoplasm, s/p Left Nephrectomy for renal cancer 2008, Bladder Cancer, LBBB, admitted on Feb. 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    #Review of system- rest of review of systems are negative except as mentioned in HPI    PHYSICAL EXAM:    Vital Signs Last 24 Hrs  T(C): 36.9 (01 Mar 2018 10:00), Max: 36.9 (01 Mar 2018 10:00)  T(F): 98.5 (01 Mar 2018 10:00), Max: 98.5 (01 Mar 2018 10:00)  HR: 77 (01 Mar 2018 10:00) (71 - 86)  BP: 128/68 (01 Mar 2018 10:00) (101/53 - 130/59)  BP(mean): 75 (01 Mar 2018 08:00) (65 - 76)  RR: 16 (01 Mar 2018 10:00) (15 - 16)  SpO2: 94% (01 Mar 2018 10:00) (90% - 99%)    GENERAL: comfortable   HEAD:  Atraumatic, Normocephalic  EYES: EOMI, PERRLA, conjunctiva and sclera clear  HEENT: Moist mucous membranes  NECK: Supple, No JVD  NERVOUS SYSTEM:  Alert & Oriented X3, Motor Strength 5/5 B/L upper and lower extremities; DTRs 2+ intact and symmetric  CHEST/LUNG: Clear to auscultation bilaterally; No rales, rhonchi, wheezing, or rubs  HEART:S1S2 normal, no murmer  ABDOMEN: Soft, Nontender, Nondistended; Bowel sounds present  GENITOURINARY- Voiding, no palpable bladder  EXTREMITIES:  2+ Peripheral Pulses, No clubbing, cyanosis, or edema  MUSCULOSKELTAL- No muscle tenderness, Muscle tone normal, No joint tenderness, no Joint swelling, Joint range of motion-normal  SKIN-no rash, no lesion  PSYCH- Mood stable  LYMPH Node- No palpable lymph node    LABS:                        8.5    8.7   )-----------( 324      ( 01 Mar 2018 06:40 )             26.6     02-28    136  |  100  |  44<H>  ----------------------------<  95  4.4   |  28  |  5.28<H>    Ca    8.2<L>      28 Feb 2018 08:56      PT/INR - ( 01 Mar 2018 06:40 )   PT: 19.4 sec;   INR: 1.78 ratio         PTT - ( 01 Mar 2018 06:40 )  PTT:33.4 sec      CAPILLARY BLOOD GLUCOSE                Standing medicine  acetaminophen   Tablet. 650 milliGRAM(s) Oral every 6 hours PRN  amiodarone    Tablet 400 milliGRAM(s) Oral two times a day  aspirin  chewable 81 milliGRAM(s) Oral daily  atorvastatin 80 milliGRAM(s) Oral at bedtime  clopidogrel Tablet 75 milliGRAM(s) Oral daily  diazepam    Tablet 5 milliGRAM(s) Oral two times a day PRN  docusate sodium 100 milliGRAM(s) Oral two times a day PRN  latanoprost 0.005% Ophthalmic Solution 1 Drop(s) Both EYES at bedtime  lidocaine   Patch 1 Patch Transdermal daily  lidocaine   Patch 1 Patch Transdermal daily  lisinopril 5 milliGRAM(s) Oral daily  melatonin 3 milliGRAM(s) Oral at bedtime  metoprolol     tartrate 50 milliGRAM(s) Oral every 12 hours  sevelamer hydrochloride 800 milliGRAM(s) Oral three times a day with meals  warfarin 3 milliGRAM(s) Oral daily

## 2018-03-02 LAB
ALBUMIN SERPL ELPH-MCNC: 1.8 G/DL — LOW (ref 3.3–5)
ANION GAP SERPL CALC-SCNC: 11 MMOL/L — SIGNIFICANT CHANGE UP (ref 5–17)
APTT BLD: 32.6 SEC — SIGNIFICANT CHANGE UP (ref 27.5–37.4)
BUN SERPL-MCNC: 42 MG/DL — HIGH (ref 7–23)
CALCIUM SERPL-MCNC: 8.5 MG/DL — SIGNIFICANT CHANGE UP (ref 8.5–10.1)
CHLORIDE SERPL-SCNC: 102 MMOL/L — SIGNIFICANT CHANGE UP (ref 96–108)
CO2 SERPL-SCNC: 24 MMOL/L — SIGNIFICANT CHANGE UP (ref 22–31)
CREAT SERPL-MCNC: 5.08 MG/DL — HIGH (ref 0.5–1.3)
CULTURE RESULTS: SIGNIFICANT CHANGE UP
GLUCOSE SERPL-MCNC: 104 MG/DL — HIGH (ref 70–99)
INR BLD: 1.95 RATIO — HIGH (ref 0.88–1.16)
PHOSPHATE SERPL-MCNC: 3.6 MG/DL — SIGNIFICANT CHANGE UP (ref 2.5–4.5)
POTASSIUM SERPL-MCNC: 4.2 MMOL/L — SIGNIFICANT CHANGE UP (ref 3.5–5.3)
POTASSIUM SERPL-SCNC: 4.2 MMOL/L — SIGNIFICANT CHANGE UP (ref 3.5–5.3)
PROTHROM AB SERPL-ACNC: 21.4 SEC — HIGH (ref 9.8–12.7)
SODIUM SERPL-SCNC: 137 MMOL/L — SIGNIFICANT CHANGE UP (ref 135–145)
SPECIMEN SOURCE: SIGNIFICANT CHANGE UP

## 2018-03-02 RX ADMIN — Medication 5 MILLIGRAM(S): at 20:21

## 2018-03-02 RX ADMIN — LIDOCAINE 1 PATCH: 4 CREAM TOPICAL at 11:49

## 2018-03-02 RX ADMIN — Medication 100 MILLIGRAM(S): at 20:26

## 2018-03-02 RX ADMIN — SEVELAMER CARBONATE 800 MILLIGRAM(S): 2400 POWDER, FOR SUSPENSION ORAL at 11:49

## 2018-03-02 RX ADMIN — Medication 81 MILLIGRAM(S): at 11:49

## 2018-03-02 RX ADMIN — WARFARIN SODIUM 3 MILLIGRAM(S): 2.5 TABLET ORAL at 20:21

## 2018-03-02 RX ADMIN — AMIODARONE HYDROCHLORIDE 400 MILLIGRAM(S): 400 TABLET ORAL at 05:16

## 2018-03-02 RX ADMIN — CLOPIDOGREL BISULFATE 75 MILLIGRAM(S): 75 TABLET, FILM COATED ORAL at 11:49

## 2018-03-02 RX ADMIN — AMIODARONE HYDROCHLORIDE 400 MILLIGRAM(S): 400 TABLET ORAL at 18:01

## 2018-03-02 RX ADMIN — LIDOCAINE 1 PATCH: 4 CREAM TOPICAL at 23:18

## 2018-03-02 RX ADMIN — ATORVASTATIN CALCIUM 80 MILLIGRAM(S): 80 TABLET, FILM COATED ORAL at 20:21

## 2018-03-02 RX ADMIN — SEVELAMER CARBONATE 800 MILLIGRAM(S): 2400 POWDER, FOR SUSPENSION ORAL at 08:23

## 2018-03-02 RX ADMIN — LIDOCAINE 1 PATCH: 4 CREAM TOPICAL at 03:00

## 2018-03-02 RX ADMIN — Medication 50 MILLIGRAM(S): at 05:16

## 2018-03-02 RX ADMIN — Medication 50 MILLIGRAM(S): at 18:01

## 2018-03-02 RX ADMIN — Medication 3 MILLIGRAM(S): at 20:21

## 2018-03-02 RX ADMIN — LATANOPROST 1 DROP(S): 0.05 SOLUTION/ DROPS OPHTHALMIC; TOPICAL at 20:21

## 2018-03-02 RX ADMIN — SEVELAMER CARBONATE 800 MILLIGRAM(S): 2400 POWDER, FOR SUSPENSION ORAL at 18:01

## 2018-03-02 NOTE — PROGRESS NOTE ADULT - SUBJECTIVE AND OBJECTIVE BOX
Patient is a 77y Male who reports no complaints overnight. Breathing stable    REVIEW OF SYSTEMS:    CONSTITUTIONAL: No weakness, fevers or chills  RESPIRATORY: No cough, wheezing, hemoptysis; stable shortness of breath  CARDIOVASCULAR: No chest pain or palpitations  GENITOURINARY: No dysuria, frequency or hematuria  All other review of systems is negative unless indicated above.    MEDICATIONS  (STANDING):  amiodarone    Tablet 400 milliGRAM(s) Oral two times a day  aspirin  chewable 81 milliGRAM(s) Oral daily  atorvastatin 80 milliGRAM(s) Oral at bedtime  clopidogrel Tablet 75 milliGRAM(s) Oral daily  latanoprost 0.005% Ophthalmic Solution 1 Drop(s) Both EYES at bedtime  lidocaine   Patch 1 Patch Transdermal daily  lidocaine   Patch 1 Patch Transdermal daily  melatonin 3 milliGRAM(s) Oral at bedtime  metoprolol     tartrate 50 milliGRAM(s) Oral every 12 hours  sevelamer hydrochloride 800 milliGRAM(s) Oral three times a day with meals  warfarin 3 milliGRAM(s) Oral daily    MEDICATIONS  (PRN):  acetaminophen   Tablet. 650 milliGRAM(s) Oral every 6 hours PRN Mild Pain (1 - 3)  diazepam    Tablet 5 milliGRAM(s) Oral two times a day PRN anxiety/agitation  docusate sodium 100 milliGRAM(s) Oral two times a day PRN Constipation        T(C): , Max: 37.1 (03-01-18 @ 17:38)  T(F): , Max: 98.8 (03-01-18 @ 17:38)  HR: 81 (03-02-18 @ 11:13)  BP: 111/61 (03-02-18 @ 11:13)  BP(mean): --  RR: 17 (03-02-18 @ 11:13)  SpO2: 94% (03-02-18 @ 11:13)  Wt(kg): --        PHYSICAL EXAM:    Constitutional: frail  HEENT: PERRLA, EOMI,  MMM  Neck: No LAD, No JVD  Respiratory: CTAB  Cardiovascular: S1 and S2, RRR  Gastrointestinal: BS+, soft, NT/ND  Extremities: No peripheral edema  Neurological: A/O x 3, no focal deficits  Psychiatric: Normal mood, normal affect  : No Vallecillo  Skin: No rashes  Access: R tunn cath        LABS:                        8.5    8.7   )-----------( 324      ( 01 Mar 2018 06:40 )             26.6     02 Mar 2018 07:35    137    |  102    |  42     ----------------------------<  104    4.2     |  24     |  5.08   01 Mar 2018 11:30    133    |  100    |  66     ----------------------------<  113    4.6     |  26     |  7.29   28 Feb 2018 08:56    136    |  100    |  44     ----------------------------<  95     4.4     |  28     |  5.28   27 Feb 2018 07:03    134    |  99     |  79     ----------------------------<  97     4.1     |  20     |  8.26     Ca    8.5        02 Mar 2018 07:35  Ca    8.5        01 Mar 2018 11:30  Ca    8.2        28 Feb 2018 08:56  Ca    8.4        27 Feb 2018 07:03  Phos  3.6       02 Mar 2018 07:35  Phos  3.5       01 Mar 2018 11:30    TPro  x      /  Alb  1.8    /  TBili  x      /  DBili  x      /  AST  x      /  ALT  x      /  AlkPhos  x      02 Mar 2018 07:35  TPro  x      /  Alb  2.1    /  TBili  x      /  DBili  x      /  AST  x      /  ALT  x      /  AlkPhos  x      01 Mar 2018 11:30  TPro  6.5    /  Alb  2.1    /  TBili  0.4    /  DBili  x      /  AST  25     /  ALT  22     /  AlkPhos  66     27 Feb 2018 07:03          Urine Studies:          RADIOLOGY & ADDITIONAL STUDIES:

## 2018-03-02 NOTE — PROGRESS NOTE ADULT - SUBJECTIVE AND OBJECTIVE BOX
Patient is a 77y old  Male who presents with a chief complaint of Fall and left leg and chest wall pain (25 Feb 2018 09:47)      HPI:  76 y/o M hx AAA 5.5 cm s/p EVAAR repair with stents 0n 1/29/18 [facility] with incidental finding of Right renal neoplasm, s/p Left Nephrectomy for renal cancer 2008, Bladder Cancer, LBBB, admitted on Feb. 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 presents from [facility] after fall. Pt reports he was walking from bed to close his door around 3:00 AM and did not use his walker and fell. He denies hitting head or LOC. Only c/o pain to Left chest wall. Denies CP/SOB, light headedness. Reports he only wants XR and refusing blood work on arrival.   Patient was seen with daughter at bedside and sister. Patient denies nausea, vomiting, bleeding or dysuria. Patient has been making some urine now. No diarrhea or abdominal pain or headache. Some cough. (25 Feb 2018 09:47)      3/2/18  No acute events occurred      PAST MEDICAL & SURGICAL HISTORY:  CAD (coronary artery disease)  History of heart artery stent: DIONICIO  Presence of stent in LAD coronary artery  History of nephrectomy: Left  S/P AAA repair      PREVIOUS DIAGNOSTIC TESTING:      MEDICATIONS  (STANDING):  aspirin  chewable 81 milliGRAM(s) Oral daily  atorvastatin 80 milliGRAM(s) Oral at bedtime  clopidogrel Tablet 75 milliGRAM(s) Oral daily  latanoprost 0.005% Ophthalmic Solution 1 Drop(s) Both EYES at bedtime  lidocaine   Patch 1 Patch Transdermal daily  lidocaine   Patch 1 Patch Transdermal daily  lisinopril 5 milliGRAM(s) Oral daily  melatonin 3 milliGRAM(s) Oral at bedtime  metoprolol     tartrate 50 milliGRAM(s) Oral every 12 hours  sevelamer hydrochloride 800 milliGRAM(s) Oral three times a day with meals    MEDICATIONS  (PRN):  acetaminophen   Tablet. 650 milliGRAM(s) Oral every 6 hours PRN Mild Pain (1 - 3)        REVIEW OF SYSTEM:  Dyspnea, fatigue, otherwise 12 point ROS Negative     Vital Signs Last 24 Hrs  T(C): 36.9 (01 Mar 2018 10:00), Max: 36.9 (01 Mar 2018 10:00)  T(F): 98.5 (01 Mar 2018 10:00), Max: 98.5 (01 Mar 2018 10:00)  HR: 77 (01 Mar 2018 10:00) (71 - 86)  BP: 128/68 (01 Mar 2018 10:00) (101/53 - 130/59)  BP(mean): 75 (01 Mar 2018 08:00) (65 - 76)  RR: 16 (01 Mar 2018 10:00) (15 - 16)  SpO2: 94% (01 Mar 2018 10:00) (90% - 99%)    I&O's Summary    PHYSICAL EXAM  General Appearance: cooperative, no acute distress,   HEENT: PERRL, conjunctiva clear, EOM's intact, non injected pharynx, no exudate, TM   normal  Neck: Supple, , no adenopathy, thyroid: not enlarged, no carotid bruit or JVD  Back: Symmetric, no  tenderness,no soft tissue tenderness  Lungs: Coarse bilaterally   Heart: Regular rate and rhythm, S1, S2 normal, no murmur, rub or gallop  Abdomen: Soft, non-tender, bowel sounds active , no hepatosplenomegaly  Extremities: no cyanosis or edema, no joint swelling  Skin: Skin color, texture normal, no rashes   Neurologic: Alert and oriented X3 , cranial nerves intact, sensory and motor normal,    ECG:    LABS:                          8.7    8.9   )-----------( 301      ( 26 Feb 2018 06:47 )             26.8     02-26    133<L>  |  98  |  63<H>  ----------------------------<  87  4.2   |  24  |  6.68<H>    Ca    8.6      26 Feb 2018 06:47  Phos  3.5     02-26    TPro  7.1  /  Alb  2.2<L>  /  TBili  0.5  /  DBili  x   /  AST  29  /  ALT  28  /  AlkPhos  71  02-26            PT/INR - ( 26 Feb 2018 06:47 )   PT: 45.0 sec;   INR: 4.05 ratio         PTT - ( 25 Feb 2018 05:23 )  PTT:39.3 sec          RADIOLOGY & ADDITIONAL STUDIES:

## 2018-03-02 NOTE — PROGRESS NOTE ADULT - SUBJECTIVE AND OBJECTIVE BOX
HPI:  Patient is 77-year-old he has a history of hypertension, high cholesterol, coronary artery disease, chronic left bundle branch block, history of bladder cancer status post left nephrectomy in the past, carotid artery disease he has nonobstructive carotid artery disease by MRA of the neck in April 2017 he has  50-60% left ICA stenosis, aortic abdominal aneurysm and he status post endovascular repair in January 2018, his  postoperative course was complicated by development of congestive heart failure and his echocardiogram has revealed left ventricular ejection fraction of approximately 25%, LVEF now 45%,  he is status post cardiac catheterization and he status post stent placement to proximal LAD, diagonal 2 and 1 circumflex. He had  drug-eluting stent placed. He  now has end-stage renal disease and requires hemodialysis. He was also treated for pneumonia & paroxysmal atrial fibrillation recently . He also has occluded right renal artery but stent could not be inserted.  . After admission  to [facility] patient had episodes of atrial fibrillation with rapid ventricular response.   He was started on amiodarone, with that he has stayed in normal sinus rhythm. He has had no further episode of atrial fibrillation. He feels better. He denies any chest pain or shortness of breath. He complains of generalized fatigue. Patient states he is frustrated, since he was asymptomatic prior to his AAA repair. He states he could easily walk 2-3 miles without any discomfort. Now he is otherwise comfortable and is in normal sinus rhythm on telemetry. He tolerated hemodialysis .         Transthoracic Echocardiogram (02.26.18   Summary     Endocardium is not always well visualized, however, overall left   ventricular systolic functionappears mildly decreased, with mild   hypokinesis of the anterolateral wall. Paradoxical septal motion is also   present. Estimated left ventricular ejection fraction is 45 %.   Significant fibrocalcific changes noted to the aortic valve leaflets with   restriction in leaflet excursion. Peak transaortic gradient is 15 mmHg;   this finding is consistent with mild aortic stenosis.   Trace aortic regurgitation is present.   The mitral valve leaflets appear thickened.   Moderate mitral annular calcification is present.   Trace mitral regurgitation is present.   EA reversal of the mitral inflow consistent with reduced compliance of   the   left ventricle.   A mild to moderate sized pericardial effusion is present.             CT Chest No Cont (02.25.18   CT THORAX      IMPRESSION:     CT CHEST: Left greater than right pleural effusions with interstitial   edema due to mild to moderate CHF. Small pericardial effusion.    Patchy bilateral tree-in-bud airspace opacity likely reflects   superimposed multilobar pneumonia. Follow up to resolution.    CT ABDOMEN/PELVIS: No solid organ or bowel injury nor skeletal trauma in   the abdomen and pelvis.    Aortobiiliac stent graft repair. Please note that graft integrity and   vascular structures cannot be evaluated on noncontrast study.    MEDICATIONS  (STANDING):  amiodarone    Tablet 400 milliGRAM(s) Oral two times a day  aspirin  chewable 81 milliGRAM(s) Oral daily  atorvastatin 80 milliGRAM(s) Oral at bedtime  clopidogrel Tablet 75 milliGRAM(s) Oral daily  latanoprost 0.005% Ophthalmic Solution 1 Drop(s) Both EYES at bedtime  lidocaine   Patch 1 Patch Transdermal daily  lidocaine   Patch 1 Patch Transdermal daily  melatonin 3 milliGRAM(s) Oral at bedtime  metoprolol     tartrate 50 milliGRAM(s) Oral every 12 hours  sevelamer hydrochloride 800 milliGRAM(s) Oral three times a day with meals  warfarin 3 milliGRAM(s) Oral daily    MEDICATIONS  (PRN):  acetaminophen   Tablet. 650 milliGRAM(s) Oral every 6 hours PRN Mild Pain (1 - 3)  diazepam    Tablet 5 milliGRAM(s) Oral two times a day PRN anxiety/agitation  docusate sodium 100 milliGRAM(s) Oral two times a day PRN Constipation          REVIEW OF SYSTEM:  Pertinent items are noted in HPI.  Constitutional	Negative for chills, fevers, sweats.    Eyes: 	Negative for visual disturbance.  Ears, nose, mouth, throat, and face: Negative for epistaxis, nasal congestion, sore throat .  Neck:	Negative for enlargement, pain and difficulty in swallowing  Respiration : Negative for cough,  pleuritic chest pain and wheezing  Cardiovascular: Negative for chest pain,  and palpitations    Gastrointestinal : Negative for abdominal pain, diarrhea, nausea and vomiting    Skin: Negative for  rash, pruritus, swelling, dryness .  	  Hematologic/lymphatic: Negative for bleeding and easy bruising  Musculoskeletal: Negative for arthralgias, back pain and muscle weakness.  Neurological: Negative for dizziness, headaches, seizures and tremors  Behavioral/Psych: Negative for mood change, depression.  Endocrine:	Negative for blurry vision, polydipsia and polyuria, diaphoresis.   Allergic/Immunologic:	Negative for anaphylaxis, angioedema and urticaria.      Vital Signs Last 24 Hrs  T(C): 37.1 (02 Mar 2018 05:11), Max: 37.1 (01 Mar 2018 17:38)  T(F): 98.7 (02 Mar 2018 05:11), Max: 98.8 (01 Mar 2018 17:38)  HR: 87 (02 Mar 2018 05:11) (78 - 97)  BP: 116/53 (02 Mar 2018 05:11) (108/58 - 130/64)  BP(mean): --  RR: 18 (01 Mar 2018 17:38) (16 - 18)  SpO2: 94% (02 Mar 2018 05:11) (94% - 94%)    I&O's Summary    PHYSICAL EXAM  General Appearance: cooperative, no acute distress,   HEENT: PERRL, conjunctiva clear but pale, EOM's intact, non injected pharynx, no exudate   Neck: Supple, , no adenopathy, thyroid: not enlarged, no carotid bruit or JVD  Lungs: Clear to auscultation bilateral,no adventitious breath sounds, normal   expiratory phase  Heart: Regular rate and rhythm, S1, S2 normal, no murmur, rub or gallop  Abdomen: Soft, non-tender, bowel sounds active , no hepatosplenomegaly  Extremities: no cyanosis or edema, no joint swelling  Skin: Skin color, texture normal, no rashes   Neurologic: Alert and oriented X3 , cranial nerves intact, sensory and motor normal,        INTERPRETATION OF TELEMETRY: NSR             LABS:                          8.5    8.7   )-----------( 324      ( 01 Mar 2018 06:40 )             26.6     03-02    137  |  102  |  42<H>  ----------------------------<  104<H>  4.2   |  24  |  5.08<H>    Ca    8.5      02 Mar 2018 07:35  Phos  3.6     03-02    TPro  x   /  Alb  1.8<L>  /  TBili  x   /  DBili  x   /  AST  x   /  ALT  x   /  AlkPhos  x   03-02            PT/INR - ( 02 Mar 2018 07:35 )   PT: 21.4 sec;   INR: 1.95 ratio         PTT - ( 02 Mar 2018 07:35 )  PTT:32.6 sec          RADIOLOGY & ADDITIONAL STUDIES:

## 2018-03-02 NOTE — PROGRESS NOTE ADULT - ASSESSMENT
77 with recent EVAAR on 1/29/18 [facility], hx of Left Nephrectomy for renal cancer 2008, Bladder Cancer, EVAAR ,re admitted on Feb. 4, 2018 for Acute Systolic CHF requiring HD in a, Afib on coumadin, HTN, HLD, CAD occlusion of Right renal artery  after EVAAR presents from [facility] after fall.     ESRD  -HD T/T/S, treatments. Complete treatment next in AM  -UF as tolerated, difficult to optimize volume at this point with tachycardia, soft bp  -Eventual AVF   -Renal imaging noted, daughter luis is in potential discussions with Dr. Oliver/Dr. Funes for outpatient evaluation of the stenotic doppler lesion (failed attempt to be intervened on at [facility])    Renal mass vs cyst  -Follows with Dr. Calderon urology    Anemia  -prbc x 1 with HD on 2/27  -May need PRBC again with HD in AM    d/c with daughter   d/c with staff

## 2018-03-02 NOTE — PROGRESS NOTE ADULT - ASSESSMENT
A/P    #Acute decompensated systolic heart failure -now appears compensated   -Maximal removal of fluids during HD as possible   -supportive care     #Afib -on amiodarone   -ct coumadin as per inr   -CHADS2-3- no need to bridge     #pericardial effusion -repeat echo prior to discharge possibly on Monday    #h/o Occluded right renal artery - doppler study noted- discussed with Dr. Saul. Pt and family decided to [facility] at [facility] for further management     #Fall- PT and supportive care     #Right renal mass- out pt management     #dvt pr -on coumadin     #discharge plan     #Above discussed with pt and all questions have been answered

## 2018-03-02 NOTE — PROGRESS NOTE ADULT - SUBJECTIVE AND OBJECTIVE BOX
HPI:  76 y/o M admitted with c/o fall and found here in er to have pneumonia with acute decompensated systolic heart failure, pleural effusion     PMH -Complicated - hx AAA 5.5 cm s/p EVAAR repair with stents 0n 1/29/18 [facility] with incidental finding of Right renal neoplasm, s/p Left Nephrectomy for renal cancer 2008, Bladder Cancer, LBBB, admitted on Feb. 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    #Review of system- rest of review of systems are negative except as mentioned in HPI    PHYSICAL EXAM:    Vital Signs Last 24 Hrs  T(C): 36.9 (02 Mar 2018 11:13), Max: 37.1 (01 Mar 2018 17:38)  T(F): 98.5 (02 Mar 2018 11:13), Max: 98.8 (01 Mar 2018 17:38)  HR: 81 (02 Mar 2018 11:13) (81 - 97)  BP: 111/61 (02 Mar 2018 11:13) (111/61 - 130/64)  BP(mean): --  RR: 17 (02 Mar 2018 11:13) (16 - 18)  SpO2: 94% (02 Mar 2018 11:13) (94% - 94%)    GENERAL: comfortable   HEAD:  Atraumatic, Normocephalic  EYES: EOMI, PERRLA, conjunctiva and sclera clear  HEENT: Moist mucous membranes  NECK: Supple, No JVD  NERVOUS SYSTEM:  Alert & Oriented X3, Motor Strength 5/5 B/L upper and lower extremities; DTRs 2+ intact and symmetric  CHEST/LUNG: Clear to auscultation bilaterally; No rales, rhonchi, wheezing, or rubs  HEART:S1S2 normal, no murmer  ABDOMEN: Soft, Nontender, Nondistended; Bowel sounds present  GENITOURINARY- Voiding, no palpable bladder  EXTREMITIES:  2+ Peripheral Pulses, No clubbing, cyanosis, or edema  MUSCULOSKELTAL- No muscle tenderness, Muscle tone normal, No joint tenderness, no Joint swelling, Joint range of motion-normal  SKIN-no rash, no lesion  PSYCH- Mood stable  LYMPH Node- No palpable lymph node                          8.5    8.7   )-----------( 324      ( 01 Mar 2018 06:40 )             26.6     03-02    137  |  102  |  42<H>  ----------------------------<  104<H>  4.2   |  24  |  5.08<H>    Ca    8.5      02 Mar 2018 07:35  Phos  3.6     03-02    TPro  x   /  Alb  1.8<L>  /  TBili  x   /  DBili  x   /  AST  x   /  ALT  x   /  AlkPhos  x   03-02    LIVER FUNCTIONS - ( 02 Mar 2018 07:35 )  Alb: 1.8 g/dL / Pro: x     / ALK PHOS: x     / ALT: x     / AST: x     / GGT: x             PT/INR - ( 02 Mar 2018 07:35 )   PT: 21.4 sec;   INR: 1.95 ratio         PTT - ( 02 Mar 2018 07:35 )  PTT:32.6 sec      PT/INR - ( 02 Mar 2018 07:35 )   PT: 21.4 sec;   INR: 1.95 ratio         PTT - ( 02 Mar 2018 07:35 )  PTT:32.6 sec  CAPILLARY BLOOD GLUCOSE          MEDICATIONS  (STANDING):  amiodarone    Tablet 400 milliGRAM(s) Oral two times a day  aspirin  chewable 81 milliGRAM(s) Oral daily  atorvastatin 80 milliGRAM(s) Oral at bedtime  clopidogrel Tablet 75 milliGRAM(s) Oral daily  latanoprost 0.005% Ophthalmic Solution 1 Drop(s) Both EYES at bedtime  lidocaine   Patch 1 Patch Transdermal daily  lidocaine   Patch 1 Patch Transdermal daily  melatonin 3 milliGRAM(s) Oral at bedtime  metoprolol     tartrate 50 milliGRAM(s) Oral every 12 hours  sevelamer hydrochloride 800 milliGRAM(s) Oral three times a day with meals  warfarin 3 milliGRAM(s) Oral daily    MEDICATIONS  (PRN):  acetaminophen   Tablet. 650 milliGRAM(s) Oral every 6 hours PRN Mild Pain (1 - 3)  diazepam    Tablet 5 milliGRAM(s) Oral two times a day PRN anxiety/agitation  docusate sodium 100 milliGRAM(s) Oral two times a day PRN Constipation

## 2018-03-02 NOTE — PROGRESS NOTE ADULT - ASSESSMENT
1) Acute Decompensated Heart Failure  2) Abnormal CT Chest  3) Pulmonary Edema  4) Bilateral Pleural Effusions  5) ESRD  6) Dyspnea    3/2/18  76 y/o M hx AAA 5.5 cm s/p EVAAR repair with stents 0n 1/29/18 [facility] with incidental finding of Right renal neoplasm, s/p Left Nephrectomy for renal cancer 2008, Bladder Cancer, LBBB, admitted on Feb. 4, 2018 for ADHF due to Acute Systolic CHF-Echo 2/18/18 EF ~25%,  Discussed findings of CT chest/pleural effusions with family  Etiology is likely secondary to heart failure/volume overload from ESRD  Agree with Cardiology/Nephrology recommendations  ABX d/c 2/26  Blood cultures NGTD  Continue HD per renal as pericardial/pleural effusions are uremic in nature  Goal SaO2 >88%  PT/OT  Thank you for the consult  Will continue to monitor

## 2018-03-02 NOTE — PROGRESS NOTE ADULT - ASSESSMENT
Status post fall on the day of admission. He feels better.   Chronic systolic/diastolic congestive heart failure. He is improving.  Paroxysmal atrial fibrillation. He is now in normal sinus rhythm.  Left pleural effusion.  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  Coronary artery disease status post multiple stents January 2018.  Aortic abdominal aneurysm status post endovascular repair.  Hypertension.  End-stage renal disease he is on hemodialysis.  Renal and bladder cancer in the past status post surgery.  Anemia.  Suggest  Observe on telemetry.  Continue with current cardiac medications. Day 4 of Amiodarone loading.  Continue with other cardiac meds.  Follow-up with electrolytes.  Follow up with H/H.  Follow low sodium low cholesterol diet.  Adjust INR approximately 2.0. After INR 2 will stop aspirin & continue with Plavix.  Follow up echo in a few days to evaluate pericardial effusion.  Antibiotics as per hospitalist.  Discussed  with patient's daughter Joi.  Discussed with the hospitalist and pulmonologist  .

## 2018-03-03 LAB
ALBUMIN SERPL ELPH-MCNC: 1.9 G/DL — LOW (ref 3.3–5)
ANION GAP SERPL CALC-SCNC: 11 MMOL/L — SIGNIFICANT CHANGE UP (ref 5–17)
APTT BLD: 35.5 SEC — SIGNIFICANT CHANGE UP (ref 27.5–37.4)
BUN SERPL-MCNC: 70 MG/DL — HIGH (ref 7–23)
CALCIUM SERPL-MCNC: 8.5 MG/DL — SIGNIFICANT CHANGE UP (ref 8.5–10.1)
CHLORIDE SERPL-SCNC: 101 MMOL/L — SIGNIFICANT CHANGE UP (ref 96–108)
CO2 SERPL-SCNC: 25 MMOL/L — SIGNIFICANT CHANGE UP (ref 22–31)
CREAT SERPL-MCNC: 6.87 MG/DL — HIGH (ref 0.5–1.3)
GLUCOSE SERPL-MCNC: 121 MG/DL — HIGH (ref 70–99)
HCT VFR BLD CALC: 28 % — LOW (ref 39–50)
HGB BLD-MCNC: 9.1 G/DL — LOW (ref 13–17)
INR BLD: 2.37 RATIO — HIGH (ref 0.88–1.16)
MCHC RBC-ENTMCNC: 29.7 PG — SIGNIFICANT CHANGE UP (ref 27–34)
MCHC RBC-ENTMCNC: 32.5 GM/DL — SIGNIFICANT CHANGE UP (ref 32–36)
MCV RBC AUTO: 91.4 FL — SIGNIFICANT CHANGE UP (ref 80–100)
PHOSPHATE SERPL-MCNC: 4.2 MG/DL — SIGNIFICANT CHANGE UP (ref 2.5–4.5)
PLATELET # BLD AUTO: 306 K/UL — SIGNIFICANT CHANGE UP (ref 150–400)
POTASSIUM SERPL-MCNC: 4.3 MMOL/L — SIGNIFICANT CHANGE UP (ref 3.5–5.3)
POTASSIUM SERPL-SCNC: 4.3 MMOL/L — SIGNIFICANT CHANGE UP (ref 3.5–5.3)
PROTHROM AB SERPL-ACNC: 26.1 SEC — HIGH (ref 9.8–12.7)
RBC # BLD: 3.06 M/UL — LOW (ref 4.2–5.8)
RBC # FLD: 14.1 % — SIGNIFICANT CHANGE UP (ref 10.3–14.5)
SODIUM SERPL-SCNC: 137 MMOL/L — SIGNIFICANT CHANGE UP (ref 135–145)
WBC # BLD: 9.3 K/UL — SIGNIFICANT CHANGE UP (ref 3.8–10.5)
WBC # FLD AUTO: 9.3 K/UL — SIGNIFICANT CHANGE UP (ref 3.8–10.5)

## 2018-03-03 RX ORDER — AMIODARONE HYDROCHLORIDE 400 MG/1
400 TABLET ORAL DAILY
Qty: 0 | Refills: 0 | Status: DISCONTINUED | OUTPATIENT
Start: 2018-03-03 | End: 2018-03-06

## 2018-03-03 RX ORDER — METOPROLOL TARTRATE 50 MG
25 TABLET ORAL
Qty: 0 | Refills: 0 | Status: DISCONTINUED | OUTPATIENT
Start: 2018-03-03 | End: 2018-03-12

## 2018-03-03 RX ADMIN — AMIODARONE HYDROCHLORIDE 400 MILLIGRAM(S): 400 TABLET ORAL at 06:22

## 2018-03-03 RX ADMIN — Medication 3 MILLIGRAM(S): at 21:32

## 2018-03-03 RX ADMIN — CLOPIDOGREL BISULFATE 75 MILLIGRAM(S): 75 TABLET, FILM COATED ORAL at 11:52

## 2018-03-03 RX ADMIN — Medication 25 MILLIGRAM(S): at 17:49

## 2018-03-03 RX ADMIN — Medication 5 MILLIGRAM(S): at 07:57

## 2018-03-03 RX ADMIN — LATANOPROST 1 DROP(S): 0.05 SOLUTION/ DROPS OPHTHALMIC; TOPICAL at 21:31

## 2018-03-03 RX ADMIN — ATORVASTATIN CALCIUM 80 MILLIGRAM(S): 80 TABLET, FILM COATED ORAL at 21:32

## 2018-03-03 RX ADMIN — Medication 50 MILLIGRAM(S): at 06:22

## 2018-03-03 RX ADMIN — Medication 100 MILLIGRAM(S): at 21:32

## 2018-03-03 RX ADMIN — WARFARIN SODIUM 3 MILLIGRAM(S): 2.5 TABLET ORAL at 21:32

## 2018-03-03 RX ADMIN — Medication 5 MILLIGRAM(S): at 21:32

## 2018-03-03 RX ADMIN — SEVELAMER CARBONATE 800 MILLIGRAM(S): 2400 POWDER, FOR SUSPENSION ORAL at 06:22

## 2018-03-03 RX ADMIN — SEVELAMER CARBONATE 800 MILLIGRAM(S): 2400 POWDER, FOR SUSPENSION ORAL at 11:52

## 2018-03-03 RX ADMIN — SEVELAMER CARBONATE 800 MILLIGRAM(S): 2400 POWDER, FOR SUSPENSION ORAL at 17:49

## 2018-03-03 RX ADMIN — Medication 650 MILLIGRAM(S): at 07:58

## 2018-03-03 NOTE — PROGRESS NOTE ADULT - ASSESSMENT
A/P    #Acute decompensated systolic heart failure -now appears compensated   -Maximal removal of fluids during HD as possible   -supportive care     #Afib -on amiodarone   -ct coumadin as per inr   -CHADS2-3    #pericardial effusion -repeat echo to follow     #h/o Occluded right renal artery - doppler study noted- discussed with Dr. Saul. Pt and family decided to [facility] at [facility] for further management     #Fall- PT and supportive care     #Right renal mass- out pt management     #dvt pr -on coumadin     #discharge plan     #Above discussed with pt and pt's family and all questions have been answered

## 2018-03-03 NOTE — PROGRESS NOTE ADULT - SUBJECTIVE AND OBJECTIVE BOX
Pt was seen and examined on date 3/3 but for some reason I don't see my note of 3/3. This note is written on 3/4     HPI:  76 y/o M admitted with c/o fall and found here in er to have pneumonia with acute decompensated systolic heart failure, pleural effusion     PMH -Complicated - hx AAA 5.5 cm s/p EVAAR repair with stents 0n 1/29/18 [facility] with incidental finding of Right renal neoplasm, s/p Left Nephrectomy for renal cancer 2008, Bladder Cancer, LBBB, admitted on Feb. 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    #Review of system- rest of review of systems are negative except as mentioned in HPI    PHYSICAL EXAM:    Vital Signs Last 24 Hrs  noted- stable     GENERAL: comfortable   HEAD:  Atraumatic, Normocephalic  EYES: EOMI, PERRLA, conjunctiva and sclera clear  HEENT: Moist mucous membranes  NECK: Supple, No JVD  NERVOUS SYSTEM:  Alert & Oriented X3, Motor Strength 5/5 B/L upper and lower extremities; DTRs 2+ intact and symmetric  CHEST/LUNG: Clear to auscultation bilaterally; No rales, rhonchi, wheezing, or rubs  HEART:S1S2 normal, no murmer  ABDOMEN: Soft, Nontender, Nondistended; Bowel sounds present  GENITOURINARY- Voiding, no palpable bladder  EXTREMITIES:  2+ Peripheral Pulses, No clubbing, cyanosis, or edema  MUSCULOSKELTAL- No muscle tenderness, Muscle tone normal, No joint tenderness, no Joint swelling, Joint range of motion-normal  SKIN-no rash,   PSYCH- Mood stable  LYMPH Node- No palpable lymph node                                     9.1    9.3   )-----------( 306      ( 03 Mar 2018 07:30 )             28.0     03-04    136  |  102  |  43<H>  ----------------------------<  93  3.7   |  28  |  4.90<H>    Ca    8.5      04 Mar 2018 05:37  Phos  3.2     03-04    TPro  x   /  Alb  2.0<L>  /  TBili  x   /  DBili  x   /  AST  x   /  ALT  x   /  AlkPhos  x   03-04    LIVER FUNCTIONS - ( 04 Mar 2018 05:37 )  Alb: 2.0 g/dL / Pro: x     / ALK PHOS: x     / ALT: x     / AST: x     / GGT: x             PT/INR - ( 04 Mar 2018 05:37 )   PT: 40.4 sec;   INR: 3.64 ratio         PTT - ( 04 Mar 2018 05:37 )  PTT:38.1 sec      PT/INR - ( 04 Mar 2018 05:37 )   PT: 40.4 sec;   INR: 3.64 ratio         PTT - ( 04 Mar 2018 05:37 )  PTT:38.1 sec  CAPILLARY BLOOD GLUCOSE          MEDICATIONS  (STANDING):  amiodarone    Tablet 400 milliGRAM(s) Oral daily  atorvastatin 80 milliGRAM(s) Oral at bedtime  clopidogrel Tablet 75 milliGRAM(s) Oral daily  latanoprost 0.005% Ophthalmic Solution 1 Drop(s) Both EYES at bedtime  lidocaine   Patch 1 Patch Transdermal daily  lidocaine   Patch 1 Patch Transdermal daily  melatonin 3 milliGRAM(s) Oral at bedtime  metoprolol     tartrate 25 milliGRAM(s) Oral two times a day  sevelamer hydrochloride 800 milliGRAM(s) Oral three times a day with meals  warfarin 3 milliGRAM(s) Oral daily    MEDICATIONS  (PRN):  acetaminophen   Tablet. 650 milliGRAM(s) Oral every 6 hours PRN Mild Pain (1 - 3)  diazepam    Tablet 5 milliGRAM(s) Oral two times a day PRN anxiety/agitation  docusate sodium 100 milliGRAM(s) Oral two times a day PRN Constipation

## 2018-03-03 NOTE — PROGRESS NOTE ADULT - SUBJECTIVE AND OBJECTIVE BOX
Patient is a 77y Male who reports no complaints overnight. Breathing stable  seen on HD  sbp dropped to 70's - only 500 ml removed as a result - pt received beta blocker prior to dialysis with AM sbp 116         REVIEW OF SYSTEMS:    CONSTITUTIONAL: No weakness, fevers or chills  RESPIRATORY: No cough, wheezing, hemoptysis; stable shortness of breath  CARDIOVASCULAR: No chest pain or palpitations  GENITOURINARY: No dysuria, frequency or hematuria  All other review of systems is negative unless indicated above.    MEDICATIONS  (STANDING):  amiodarone    Tablet 400 milliGRAM(s) Oral daily  atorvastatin 80 milliGRAM(s) Oral at bedtime  clopidogrel Tablet 75 milliGRAM(s) Oral daily  latanoprost 0.005% Ophthalmic Solution 1 Drop(s) Both EYES at bedtime  lidocaine   Patch 1 Patch Transdermal daily  lidocaine   Patch 1 Patch Transdermal daily  melatonin 3 milliGRAM(s) Oral at bedtime  metoprolol     tartrate 25 milliGRAM(s) Oral two times a day  sevelamer hydrochloride 800 milliGRAM(s) Oral three times a day with meals  warfarin 3 milliGRAM(s) Oral daily    Vital Signs Last 24 Hrs  T(C): 36.6 (03 Mar 2018 11:05), Max: 36.9 (03 Mar 2018 07:15)  T(F): 97.8 (03 Mar 2018 11:05), Max: 98.4 (03 Mar 2018 07:15)  HR: 82 (03 Mar 2018 11:14) (71 - 92)  BP: 111/54 (03 Mar 2018 11:14) (73/46 - 130/62)  BP(mean): --  RR: 16 (03 Mar 2018 11:05) (16 - 18)  SpO2: 96% (03 Mar 2018 04:45) (96% - 98%)      PHYSICAL EXAM:    Constitutional: frail  HEENT: , EOMI,  MMM  Neck: No LAD, No JVD  Respiratory: CTAB  Cardiovascular: S1 and S2, RRR  Gastrointestinal: BS+, soft, NT/ND  Extremities: No peripheral edema  Neurological: A/O x 3, no focal deficits  Psychiatric: Normal mood, normal affect  : No Vallecillo  Skin: No rashes  Access: R tunn cath        LABS:                                         9.1    9.3   )-----------( 306      ( 03 Mar 2018 07:30 )             28.0   1  137    |  101    |  70     ----------------------------<  121       03 Mar 2018 07:30  4.3     |  25     |  6.87     137    |  102    |  42     ----------------------------<  104       02 Mar 2018 07:35  4.2     |  24     |  5.08     133    |  100    |  66     ----------------------------<  113       01 Mar 2018 11:30  4.6     |  26     |  7.29     Ca    8.5        03 Mar 2018 07:30  Ca    8.5        02 Mar 2018 07:35    Phos  4.2       03 Mar 2018 07:30      Urine Studies:      RADIOLOGY & ADDITIONAL STUDIES:

## 2018-03-03 NOTE — PROGRESS NOTE ADULT - ASSESSMENT
77 with recent EVAAR on 1/29/18 [facility], hx of Left Nephrectomy for renal cancer 2008, Bladder Cancer, EVAAR ,re admitted on Feb. 4, 2018 for Acute Systolic CHF requiring HD in a, Afib on coumadin, HTN, HLD, CAD occlusion of Right renal artery  after [facility].     DONNIE after acute renal event post EVAAR w solitary kidney    remains HD dependent - HD TTS schedule   if no recovery eventual AVF    Hypotension w pericardial effusions  -UF as tolerated, difficult to optimize volume at this point with tachycardia,  - PLEASE HOLD BP meds prior to HD!!  -reduced metoprolol dose w parameters  -Renal imaging noted, daughter luis is in potential discussions with Dr. Oliver/Dr. Funes for outpatient evaluation of the stenotic doppler lesion (failed attempt to be intervened on at [facility]) - daughter discussed with DrGeorge     Renal mass   -Follows with Dr. Calderon urology    Anemia  -s/p PRBC on 2/27   - monitor hb   - no james due to malignancy possiblity - await heme clearance

## 2018-03-03 NOTE — PROGRESS NOTE ADULT - ASSESSMENT
Status post fall on the day of admission. He feels better.   Chronic systolic/diastolic congestive heart failure. He is improving.  Paroxysmal atrial fibrillation. He is now in normal sinus rhythm.  Left pleural effusion.  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  Coronary artery disease status post multiple stents January 2018.  Aortic abdominal aneurysm status post endovascular repair.  Hypertension.  End-stage renal disease he is on hemodialysis.  Renal and bladder cancer in the past status post surgery.  Anemia.  Suggest  Observe on telemetry.  Continue with current cardiac medications   , decrease Amiodarone to 400 mg by mouth once daily.  Continue with other cardiac meds.  Follow-up with electrolytes.  Follow up with H/H.  Follow low sodium low cholesterol diet.  Adjust INR approximately 2.0.   Follow up echo  to evaluate pericardial effusion.    Discussed  with patient's daughter Jennifer encarnacion .  Discussed with the hospitalist .

## 2018-03-03 NOTE — PROGRESS NOTE ADULT - SUBJECTIVE AND OBJECTIVE BOX
HPI:  Patient is 77-year-old he has a history of hypertension, high cholesterol, coronary artery disease, chronic left bundle branch block, history of bladder cancer status post left nephrectomy in the past, carotid artery disease he has nonobstructive carotid artery disease by MRA of the neck in April 2017 he has  50-60% left ICA stenosis, aortic abdominal aneurysm and he status post endovascular repair in January 2018, his  postoperative course was complicated by development of congestive heart failure and his echocardiogram has revealed left ventricular ejection fraction of approximately 25%, LVEF now 45%,  he is status post cardiac catheterization and he status post stent placement to proximal LAD, diagonal 2 and 1 circumflex. He had  drug-eluting stent placed. He  now has end-stage renal disease and requires hemodialysis. He was also treated for pneumonia & paroxysmal atrial fibrillation recently . He also has occluded right renal artery but stent could not be inserted.  . After admission  to [facility] patient had episodes of atrial fibrillation with rapid ventricular response.   He was started on amiodarone, with that he has stayed in normal sinus rhythm. He has had no further episode of atrial fibrillation. He feels better since yesterday and is on dialysis this morning. He denies any chest pain, shortness of breath or palpitation. His fiancé is at bedside. He is hemodynamically stable.         Transthoracic Echocardiogram (02.26.18   Summary     Endocardium is not always well visualized, however, overall left   ventricular systolic functionappears mildly decreased, with mild   hypokinesis of the anterolateral wall. Paradoxical septal motion is also   present. Estimated left ventricular ejection fraction is 45 %.   Significant fibrocalcific changes noted to the aortic valve leaflets with   restriction in leaflet excursion. Peak transaortic gradient is 15 mmHg;   this finding is consistent with mild aortic stenosis.   Trace aortic regurgitation is present.   The mitral valve leaflets appear thickened.   Moderate mitral annular calcification is present.   Trace mitral regurgitation is present.   EA reversal of the mitral inflow consistent with reduced compliance of   the   left ventricle.   A mild to moderate sized pericardial effusion is present.      MEDICATIONS  (STANDING):  amiodarone    Tablet 400 milliGRAM(s) Oral daily  atorvastatin 80 milliGRAM(s) Oral at bedtime  clopidogrel Tablet 75 milliGRAM(s) Oral daily  latanoprost 0.005% Ophthalmic Solution 1 Drop(s) Both EYES at bedtime  lidocaine   Patch 1 Patch Transdermal daily  lidocaine   Patch 1 Patch Transdermal daily  melatonin 3 milliGRAM(s) Oral at bedtime  metoprolol     tartrate 50 milliGRAM(s) Oral every 12 hours  sevelamer hydrochloride 800 milliGRAM(s) Oral three times a day with meals  warfarin 3 milliGRAM(s) Oral daily    MEDICATIONS  (PRN):  acetaminophen   Tablet. 650 milliGRAM(s) Oral every 6 hours PRN Mild Pain (1 - 3)  diazepam    Tablet 5 milliGRAM(s) Oral two times a day PRN anxiety/agitation  docusate sodium 100 milliGRAM(s) Oral two times a day PRN Constipation          REVIEW OF SYSTEM:  Pertinent items are noted in HPI.  Constitutional	Negative for chills, fevers, sweats.    Eyes: 	Negative for visual disturbance.  Ears, nose, mouth, throat, and face: Negative for epistaxis, nasal congestion, sore throat .  Neck:	Negative for enlargement, pain and difficulty in swallowing  Respiration : Negative for cough, dyspnea on exertion, pleuritic chest pain and wheezing  Cardiovascular: Negative for chest pain, dyspnea and palpitations    Gastrointestinal : Negative for abdominal pain, diarrhea, nausea and vomiting  Genitourinary: Negative for dysuria, frequency and urinary incontinence .  Skin: Negative for  rash, pruritus, swelling, dryness .  	  Hematologic/lymphatic: Negative for bleeding and easy bruising  Musculoskeletal: Negative for arthralgias, back pain and muscle weakness.  Neurological: Negative for dizziness, headaches, seizures and tremors  Behavioral/Psych: Negative for mood change, depression.  Endocrine:	Negative for blurry vision, polydipsia and polyuria, diaphoresis.   Allergic/Immunologic:	Negative for anaphylaxis, angioedema and urticaria.      Vital Signs Last 24 Hrs  T(C): 36.9 (03 Mar 2018 07:15), Max: 36.9 (02 Mar 2018 11:13)  T(F): 98.4 (03 Mar 2018 07:15), Max: 98.5 (02 Mar 2018 11:13)  HR: 76 (03 Mar 2018 10:02) (71 - 92)  BP: 96/50 (03 Mar 2018 10:02) (73/46 - 130/62)  BP(mean): --  RR: 16 (03 Mar 2018 10:02) (16 - 18)  SpO2: 96% (03 Mar 2018 04:45) (94% - 98%)        PHYSICAL EXAM  General Appearance: cooperative, no acute distress,   HEENT: PERRL, conjunctiva clear, EOM's intact, non injected pharynx, no exudate .  Neck: Supple, , no adenopathy, thyroid: not enlarged, no carotid bruit or JVD  Back: Symmetric, no  tenderness,no soft tissue tenderness  Lungs: Clear to auscultation bilateral,no adventitious breath sounds, normal   expiratory phase  Heart: Regular rate and rhythm, S1, S2 normal, no murmur, rub or gallop  Abdomen: Soft, non-tender, bowel sounds active , no hepatosplenomegaly  Extremities: no cyanosis or edema, no joint swelling  Skin: Skin color, texture normal, no rashes   Neurologic: Alert and oriented X3 , cranial nerves intact, sensory and motor normal,        INTERPRETATION OF TELEMETRY: NSR            LABS:                          9.1    9.3   )-----------( 306      ( 03 Mar 2018 07:30 )             28.0     03-03    137  |  101  |  70<H>  ----------------------------<  121<H>  4.3   |  25  |  6.87<H>    Ca    8.5      03 Mar 2018 07:30  Phos  4.2     03-03    TPro  x   /  Alb  1.9<L>  /  TBili  x   /  DBili  x   /  AST  x   /  ALT  x   /  AlkPhos  x   03-03            PT/INR - ( 03 Mar 2018 07:30 )   PT: 26.1 sec;   INR: 2.37 ratio         PTT - ( 03 Mar 2018 07:30 )  PTT:35.5 sec

## 2018-03-04 LAB
ALBUMIN SERPL ELPH-MCNC: 2 G/DL — LOW (ref 3.3–5)
ANION GAP SERPL CALC-SCNC: 6 MMOL/L — SIGNIFICANT CHANGE UP (ref 5–17)
APTT BLD: 38.1 SEC — HIGH (ref 27.5–37.4)
BUN SERPL-MCNC: 43 MG/DL — HIGH (ref 7–23)
CALCIUM SERPL-MCNC: 8.5 MG/DL — SIGNIFICANT CHANGE UP (ref 8.5–10.1)
CHLORIDE SERPL-SCNC: 102 MMOL/L — SIGNIFICANT CHANGE UP (ref 96–108)
CO2 SERPL-SCNC: 28 MMOL/L — SIGNIFICANT CHANGE UP (ref 22–31)
CREAT SERPL-MCNC: 4.9 MG/DL — HIGH (ref 0.5–1.3)
GLUCOSE SERPL-MCNC: 93 MG/DL — SIGNIFICANT CHANGE UP (ref 70–99)
INR BLD: 3.64 RATIO — HIGH (ref 0.88–1.16)
PHOSPHATE SERPL-MCNC: 3.2 MG/DL — SIGNIFICANT CHANGE UP (ref 2.5–4.5)
POTASSIUM SERPL-MCNC: 3.7 MMOL/L — SIGNIFICANT CHANGE UP (ref 3.5–5.3)
POTASSIUM SERPL-SCNC: 3.7 MMOL/L — SIGNIFICANT CHANGE UP (ref 3.5–5.3)
PROTHROM AB SERPL-ACNC: 40.4 SEC — HIGH (ref 9.8–12.7)
SODIUM SERPL-SCNC: 136 MMOL/L — SIGNIFICANT CHANGE UP (ref 135–145)

## 2018-03-04 RX ADMIN — Medication 5 MILLIGRAM(S): at 21:46

## 2018-03-04 RX ADMIN — Medication 3 MILLIGRAM(S): at 21:46

## 2018-03-04 RX ADMIN — AMIODARONE HYDROCHLORIDE 400 MILLIGRAM(S): 400 TABLET ORAL at 05:13

## 2018-03-04 RX ADMIN — LATANOPROST 1 DROP(S): 0.05 SOLUTION/ DROPS OPHTHALMIC; TOPICAL at 21:46

## 2018-03-04 RX ADMIN — SEVELAMER CARBONATE 800 MILLIGRAM(S): 2400 POWDER, FOR SUSPENSION ORAL at 12:05

## 2018-03-04 RX ADMIN — LIDOCAINE 1 PATCH: 4 CREAM TOPICAL at 12:06

## 2018-03-04 RX ADMIN — Medication 25 MILLIGRAM(S): at 17:59

## 2018-03-04 RX ADMIN — CLOPIDOGREL BISULFATE 75 MILLIGRAM(S): 75 TABLET, FILM COATED ORAL at 12:05

## 2018-03-04 RX ADMIN — ATORVASTATIN CALCIUM 80 MILLIGRAM(S): 80 TABLET, FILM COATED ORAL at 21:46

## 2018-03-04 RX ADMIN — SEVELAMER CARBONATE 800 MILLIGRAM(S): 2400 POWDER, FOR SUSPENSION ORAL at 08:18

## 2018-03-04 RX ADMIN — Medication 100 MILLIGRAM(S): at 15:58

## 2018-03-04 RX ADMIN — Medication 25 MILLIGRAM(S): at 05:13

## 2018-03-04 RX ADMIN — SEVELAMER CARBONATE 800 MILLIGRAM(S): 2400 POWDER, FOR SUSPENSION ORAL at 17:59

## 2018-03-04 NOTE — PROGRESS NOTE ADULT - ASSESSMENT
A/P    #Acute decompensated systolic heart failure -now appears compensated   -Maximal removal of fluids during HD as possible   -supportive care     #Afib -on amiodarone   -ct coumadin as per inr   -CHADS2-3- no need to bridge     #pericardial effusion -repeat echo noted and as per Dr. Jones, its resolving     #h/o Occluded right renal artery - doppler study noted- discussed with Dr. Saul. Pt and family decided to follow up at [facility] center for further management     #Fall- PT and supportive care     #Right renal mass- out pt management     #dvt pr -on coumadin     #discharge plan     #Above discussed with pt and pt's family and all questions have been answered     #discharge plan for tomorrow to [facility]

## 2018-03-04 NOTE — PROGRESS NOTE ADULT - SUBJECTIVE AND OBJECTIVE BOX
HPI:  Patient is 77-year-old he has a history of hypertension, high cholesterol, coronary artery disease, chronic left bundle branch block, history of bladder cancer status post left nephrectomy in the past, carotid artery disease he has nonobstructive carotid artery disease by MRA of the neck in April 2017 he has  50-60% left ICA stenosis, aortic abdominal aneurysm and he status post endovascular repair in January 2018, his  postoperative course was complicated by development of congestive heart failure and his echocardiogram has revealed left ventricular ejection fraction of approximately 25%, LVEF now 45%,  he is status post cardiac catheterization and he status post stent placement to proximal LAD, diagonal 2 and 1 circumflex. He had  drug-eluting stent placed. He  now has end-stage renal disease and requires hemodialysis. He was also treated for pneumonia & paroxysmal atrial fibrillation recently . He also has occluded right renal artery but stent could not be inserted.  . After admission  to [facility] patient had episodes of atrial fibrillation with rapid ventricular response.   He was started on amiodarone, with that he has stayed in normal sinus rhythm. He has had no further episode of atrial fibrillation. He is comfortable and he continues to be hemodynamically stable. He denies any chest pain or shortness of breath.         Transthoracic Echocardiogram (02.26.18   Summary     Endocardium is not always well visualized, however, overall left   ventricular systolic functionappears mildly decreased, with mild   hypokinesis of the anterolateral wall. Paradoxical septal motion is also   present. Estimated left ventricular ejection fraction is 45 %.   Significant fibrocalcific changes noted to the aortic valve leaflets with   restriction in leaflet excursion. Peak transaortic gradient is 15 mmHg;   this finding is consistent with mild aortic stenosis.   Trace aortic regurgitation is present.   The mitral valve leaflets appear thickened.   Moderate mitral annular calcification is present.   Trace mitral regurgitation is present.   EA reversal of the mitral inflow consistent with reduced compliance of   the   left ventricle.   A mild to moderate sized pericardial effusion is present.    MEDICATIONS  (STANDING):  amiodarone    Tablet 400 milliGRAM(s) Oral daily  atorvastatin 80 milliGRAM(s) Oral at bedtime  clopidogrel Tablet 75 milliGRAM(s) Oral daily  latanoprost 0.005% Ophthalmic Solution 1 Drop(s) Both EYES at bedtime  lidocaine   Patch 1 Patch Transdermal daily  lidocaine   Patch 1 Patch Transdermal daily  melatonin 3 milliGRAM(s) Oral at bedtime  metoprolol     tartrate 25 milliGRAM(s) Oral two times a day  sevelamer hydrochloride 800 milliGRAM(s) Oral three times a day with meals  warfarin 3 milliGRAM(s) Oral daily    MEDICATIONS  (PRN):  acetaminophen   Tablet. 650 milliGRAM(s) Oral every 6 hours PRN Mild Pain (1 - 3)  diazepam    Tablet 5 milliGRAM(s) Oral two times a day PRN anxiety/agitation  docusate sodium 100 milliGRAM(s) Oral two times a day PRN Constipation          REVIEW OF SYSTEM:  Pertinent items are noted in HPI.  Constitutional	Negative for chills, fevers, sweats.    Eyes: 	Negative for visual disturbance.  Ears, nose, mouth, throat, and face: Negative for epistaxis, nasal congestion, sore throat   Neck:	Negative for enlargement, pain and difficulty in swallowing  Respiration : Negative for cough, dyspnea on exertion, pleuritic chest pain and wheezing  Cardiovascular: Negative for chest pain, dyspnea and palpitations    Gastrointestinal : Negative for abdominal pain, diarrhea, nausea and vomiting  Genitourinary: Negative for dysuria, frequency and urinary incontinence .  Skin: Negative for  rash, pruritus, swelling, dryness .  	  Hematologic/lymphatic: Negative for bleeding and easy bruising  Musculoskeletal: Negative for arthralgias, back pain and muscle weakness.  Neurological: Negative for dizziness, headaches, seizures and tremors  Behavioral/Psych: Negative for mood change, depression.  Endocrine:	Negative for blurry vision, polydipsia and polyuria, diaphoresis.   Allergic/Immunologic:	Negative for anaphylaxis, angioedema and urticaria.      Vital Signs Last 24 Hrs  T(C): 36.7 (04 Mar 2018 05:37), Max: 36.8 (04 Mar 2018 05:11)  T(F): 98.1 (04 Mar 2018 05:37), Max: 98.3 (04 Mar 2018 05:11)  HR: 88 (04 Mar 2018 05:37) (76 - 95)  BP: 130/69 (04 Mar 2018 05:37) (73/46 - 130/69)  BP(mean): --  RR: 16 (04 Mar 2018 05:11) (16 - 17)  SpO2: 97% (04 Mar 2018 05:37) (96% - 97%)      PHYSICAL EXAM  General Appearance: cooperative, no acute distress,   HEENT: PERRL, conjunctiva clear, EOM's intact, non injected pharynx, no exudate .  Neck: Supple, , no adenopathy, thyroid: not enlarged, no carotid bruit or JVD  Lungs: Diminished breath sounds in both bases, no rales or rhonchi's.  Heart: Regular rate and rhythm, S1, S2 normal, no murmur, rub or gallop  Abdomen: Soft, non-tender, bowel sounds active , no hepatosplenomegaly  Extremities: no cyanosis or edema, no joint swelling  Skin: Skin color, texture normal, no rashes   Neurologic: Alert and oriented X3 , cranial nerves intact, sensory and motor normal,        INTERPRETATION OF TELEMETRY: NSR          LABS:                          9.1    9.3   )-----------( 306      ( 03 Mar 2018 07:30 )             28.0     03-04    136  |  102  |  43<H>  ----------------------------<  93  3.7   |  28  |  4.90<H>    Ca    8.5      04 Mar 2018 05:37  Phos  3.2     03-04    TPro  x   /  Alb  2.0<L>  /  TBili  x   /  DBili  x   /  AST  x   /  ALT  x   /  AlkPhos  x   03-04            PT/INR - ( 04 Mar 2018 05:37 )   PT: 40.4 sec;   INR: 3.64 ratio         PTT - ( 04 Mar 2018 05:37 )  PTT:38.1 sec          RADIOLOGY & ADDITIONAL STUDIES:

## 2018-03-04 NOTE — PROGRESS NOTE ADULT - SUBJECTIVE AND OBJECTIVE BOX
Patient is a 77y Male who reports no complaints overnight. Breathing stable  feelign well   no complaints           REVIEW OF SYSTEMS:    CONSTITUTIONAL: No weakness, fevers or chills  RESPIRATORY: No cough, wheezing, hemoptysis; stable shortness of breath  CARDIOVASCULAR: No chest pain or palpitations  GENITOURINARY: No dysuria, frequency or hematuria  All other review of systems is negative unless indicated above.    MEDICATIONS  (STANDING):  amiodarone    Tablet 400 milliGRAM(s) Oral daily  atorvastatin 80 milliGRAM(s) Oral at bedtime  clopidogrel Tablet 75 milliGRAM(s) Oral daily  latanoprost 0.005% Ophthalmic Solution 1 Drop(s) Both EYES at bedtime  lidocaine   Patch 1 Patch Transdermal daily  lidocaine   Patch 1 Patch Transdermal daily  melatonin 3 milliGRAM(s) Oral at bedtime  metoprolol     tartrate 25 milliGRAM(s) Oral two times a day  sevelamer hydrochloride 800 milliGRAM(s) Oral three times a day with meals  warfarin 3 milliGRAM(s) Oral daily    Vital Signs Last 24 Hrs  T(C): 36.4 (04 Mar 2018 09:29), Max: 36.8 (04 Mar 2018 05:11)  T(F): 97.6 (04 Mar 2018 09:29), Max: 98.3 (04 Mar 2018 05:11)  HR: 86 (04 Mar 2018 12:00) (80 - 95)  BP: 128/77 (04 Mar 2018 12:00) (125/67 - 130/69)  BP(mean): --  RR: 17 (04 Mar 2018 09:29) (16 - 17)  SpO2: 98% (04 Mar 2018 09:29) (96% - 98%)    PHYSICAL EXAM:    Constitutional: frail  HEENT: , EOMI,  MMM  Neck: No LAD, No JVD  Respiratory: CTAB  Cardiovascular: S1 and S2, RRR  Gastrointestinal: BS+, soft, NT/ND  Extremities: No peripheral edema  Neurological: A/O x 3, no focal deficits  Psychiatric: Normal mood, normal affect  : No Vallecillo  Skin: No rashes  Access: R tunn cath        LABS:                                                         9.1    9.3   )-----------( 306      ( 03 Mar 2018 07:30 )             28.0     136    |  102    |  43     ----------------------------<  93        04 Mar 2018 05:37  3.7     |  28     |  4.90     137    |  101    |  70     ----------------------------<  121       03 Mar 2018 07:30  4.3     |  25     |  6.87     137    |  102    |  42     ----------------------------<  104       02 Mar 2018 07:35  4.2     |  24     |  5.08     Ca    8.5        04 Mar 2018 05:37  Ca    8.5        03 Mar 2018 07:30    Phos  3.2       04 Mar 2018 05:37  Phos  4.2       03 Mar 2018 07:30    Urine Studies:      RADIOLOGY & ADDITIONAL STUDIES:

## 2018-03-04 NOTE — PROGRESS NOTE ADULT - ASSESSMENT
77 with recent EVAAR on 1/29/18 [facility], hx of Left Nephrectomy for renal cancer 2008, Bladder Cancer, EVAAR ,re admitted on Feb. 4, 2018 for Acute Systolic CHF requiring HD in a, Afib on coumadin, HTN, HLD, CAD occlusion of Right renal artery  after EVAAR presents from [facility] after fall.     DONNIE after acute renal event post EVAAR w solitary kidney    remains HD dependent - HD TTS schedule   if no recovery eventual AVF   [facility]     Hypotension w pericardial effusions  -UF as tolerated, difficult to optimize volume at this point with tachycardia,  - PLEASE HOLD BP meds prior to HD!!  -reduced metoprolol dose w parameters  -Renal imaging noted, daughter luis is in potential discussions with Dr. Oliver/Dr. Funes for outpatient evaluation of the stenotic doppler lesion (failed attempt to be intervened on at [facility]) - daughter discussed with Leslieorge     Renal mass   -Follows with Dr. Calderon urology    Anemia  -s/p PRBC on 2/27   - monitor hb   - no james due to malignancy possiblity - await heme clearance

## 2018-03-04 NOTE — PROGRESS NOTE ADULT - ASSESSMENT
Chronic systolic/diastolic congestive heart failure. He is improving.  Paroxysmal atrial fibrillation. He is now in normal sinus rhythm.  Left pleural effusion.  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 Effusion is resolving .  Coronary artery disease status post multiple stents January 2018.  Aortic abdominal aneurysm status post endovascular repair.  Hypertension.  End-stage renal disease he is on hemodialysis.  Renal and bladder cancer in the past status post surgery.  Anemia.  Suggest  Observe on telemetry.  Continue with current cardiac medications   .  Continue with other cardiac meds.  Follow-up with electrolytes.  Follow up with H/H.  Follow low sodium low cholesterol diet.  Adjust INR approximately 2.0.   Gradusl ambulation.  Discussed with the hospitalist .

## 2018-03-04 NOTE — PROGRESS NOTE ADULT - SUBJECTIVE AND OBJECTIVE BOX
HPI:  78 y/o M admitted with c/o fall and found here in er to have pneumonia with acute decompensated systolic heart failure, pleural effusion     PMH -Complicated - hx AAA 5.5 cm s/p EVAAR repair with stents 0n 1/29/18 [facility] with incidental finding of Right renal neoplasm, s/p Left Nephrectomy for renal cancer 2008, Bladder Cancer, LBBB, admitted on Feb. 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    #Review of system- rest of review of systems are negative except as mentioned in HPI    PHYSICAL EXAM:    Vital Signs Last 24 Hrs  T(C): 36.4 (04 Mar 2018 09:29), Max: 36.8 (04 Mar 2018 05:11)  T(F): 97.6 (04 Mar 2018 09:29), Max: 98.3 (04 Mar 2018 05:11)  HR: 86 (04 Mar 2018 12:00) (80 - 95)  BP: 128/77 (04 Mar 2018 12:00) (125/67 - 130/69)  BP(mean): --  RR: 17 (04 Mar 2018 09:29) (16 - 17)  SpO2: 98% (04 Mar 2018 09:29) (96% - 98%)    GENERAL: comfortable   HEAD:  Atraumatic, Normocephalic  EYES: EOMI, PERRLA, conjunctiva and sclera clear  HEENT: Moist mucous membranes  NECK: Supple, No JVD  NERVOUS SYSTEM:  Alert & Oriented X3, Motor Strength 5/5 B/L upper and lower extremities; DTRs 2+ intact and symmetric  CHEST/LUNG: Clear to auscultation bilaterally; No rales, rhonchi, wheezing, or rubs  HEART:S1S2 normal, no murmer  ABDOMEN: Soft, Nontender, Nondistended; Bowel sounds present  GENITOURINARY- Voiding, no palpable bladder  EXTREMITIES:  2+ Peripheral Pulses, No clubbing, cyanosis, or edema  MUSCULOSKELTAL- No muscle tenderness, Muscle tone normal, No joint tenderness, no Joint swelling, Joint range of motion-normal  SKIN-no rash,   PSYCH- Mood stable  LYMPH Node- No palpable lymph node                                     9.1    9.3   )-----------( 306      ( 03 Mar 2018 07:30 )             28.0     03-04    136  |  102  |  43<H>  ----------------------------<  93  3.7   |  28  |  4.90<H>    Ca    8.5      04 Mar 2018 05:37  Phos  3.2     03-04    TPro  x   /  Alb  2.0<L>  /  TBili  x   /  DBili  x   /  AST  x   /  ALT  x   /  AlkPhos  x   03-04    LIVER FUNCTIONS - ( 04 Mar 2018 05:37 )  Alb: 2.0 g/dL / Pro: x     / ALK PHOS: x     / ALT: x     / AST: x     / GGT: x             PT/INR - ( 04 Mar 2018 05:37 )   PT: 40.4 sec;   INR: 3.64 ratio         PTT - ( 04 Mar 2018 05:37 )  PTT:38.1 sec      PT/INR - ( 04 Mar 2018 05:37 )   PT: 40.4 sec;   INR: 3.64 ratio         PTT - ( 04 Mar 2018 05:37 )  PTT:38.1 sec  CAPILLARY BLOOD GLUCOSE          MEDICATIONS  (STANDING):  amiodarone    Tablet 400 milliGRAM(s) Oral daily  atorvastatin 80 milliGRAM(s) Oral at bedtime  clopidogrel Tablet 75 milliGRAM(s) Oral daily  latanoprost 0.005% Ophthalmic Solution 1 Drop(s) Both EYES at bedtime  lidocaine   Patch 1 Patch Transdermal daily  lidocaine   Patch 1 Patch Transdermal daily  melatonin 3 milliGRAM(s) Oral at bedtime  metoprolol     tartrate 25 milliGRAM(s) Oral two times a day  sevelamer hydrochloride 800 milliGRAM(s) Oral three times a day with meals  warfarin 3 milliGRAM(s) Oral daily    MEDICATIONS  (PRN):  acetaminophen   Tablet. 650 milliGRAM(s) Oral every 6 hours PRN Mild Pain (1 - 3)  diazepam    Tablet 5 milliGRAM(s) Oral two times a day PRN anxiety/agitation  docusate sodium 100 milliGRAM(s) Oral two times a day PRN Constipation

## 2018-03-05 LAB
ALBUMIN SERPL ELPH-MCNC: 1.9 G/DL — LOW (ref 3.3–5)
ANION GAP SERPL CALC-SCNC: 10 MMOL/L — SIGNIFICANT CHANGE UP (ref 5–17)
APTT BLD: 42.2 SEC — HIGH (ref 27.5–37.4)
BUN SERPL-MCNC: 59 MG/DL — HIGH (ref 7–23)
CALCIUM SERPL-MCNC: 8.4 MG/DL — LOW (ref 8.5–10.1)
CHLORIDE SERPL-SCNC: 101 MMOL/L — SIGNIFICANT CHANGE UP (ref 96–108)
CO2 SERPL-SCNC: 24 MMOL/L — SIGNIFICANT CHANGE UP (ref 22–31)
CREAT SERPL-MCNC: 6.8 MG/DL — HIGH (ref 0.5–1.3)
GLUCOSE BLDC GLUCOMTR-MCNC: 115 MG/DL — HIGH (ref 70–99)
GLUCOSE SERPL-MCNC: 92 MG/DL — SIGNIFICANT CHANGE UP (ref 70–99)
HCT VFR BLD CALC: 27.6 % — LOW (ref 39–50)
HGB BLD-MCNC: 8.8 G/DL — LOW (ref 13–17)
INR BLD: 5.1 RATIO — CRITICAL HIGH (ref 0.88–1.16)
MCHC RBC-ENTMCNC: 29.4 PG — SIGNIFICANT CHANGE UP (ref 27–34)
MCHC RBC-ENTMCNC: 32 GM/DL — SIGNIFICANT CHANGE UP (ref 32–36)
MCV RBC AUTO: 92 FL — SIGNIFICANT CHANGE UP (ref 80–100)
PHOSPHATE SERPL-MCNC: 3.9 MG/DL — SIGNIFICANT CHANGE UP (ref 2.5–4.5)
PLATELET # BLD AUTO: 298 K/UL — SIGNIFICANT CHANGE UP (ref 150–400)
POTASSIUM SERPL-MCNC: 4.1 MMOL/L — SIGNIFICANT CHANGE UP (ref 3.5–5.3)
POTASSIUM SERPL-SCNC: 4.1 MMOL/L — SIGNIFICANT CHANGE UP (ref 3.5–5.3)
PROTHROM AB SERPL-ACNC: 56.9 SEC — HIGH (ref 9.8–12.7)
RBC # BLD: 3 M/UL — LOW (ref 4.2–5.8)
RBC # FLD: 14.4 % — SIGNIFICANT CHANGE UP (ref 10.3–14.5)
SEROTONIN SER-MCNC: 54 NG/ML — SIGNIFICANT CHANGE UP
SODIUM SERPL-SCNC: 135 MMOL/L — SIGNIFICANT CHANGE UP (ref 135–145)
WBC # BLD: 7 K/UL — SIGNIFICANT CHANGE UP (ref 3.8–10.5)
WBC # FLD AUTO: 7 K/UL — SIGNIFICANT CHANGE UP (ref 3.8–10.5)

## 2018-03-05 RX ORDER — PHYTONADIONE (VIT K1) 5 MG
2.5 TABLET ORAL ONCE
Qty: 0 | Refills: 0 | Status: COMPLETED | OUTPATIENT
Start: 2018-03-05 | End: 2018-03-05

## 2018-03-05 RX ADMIN — LIDOCAINE 1 PATCH: 4 CREAM TOPICAL at 12:33

## 2018-03-05 RX ADMIN — Medication 25 MILLIGRAM(S): at 05:40

## 2018-03-05 RX ADMIN — LIDOCAINE 1 PATCH: 4 CREAM TOPICAL at 23:39

## 2018-03-05 RX ADMIN — SEVELAMER CARBONATE 800 MILLIGRAM(S): 2400 POWDER, FOR SUSPENSION ORAL at 12:34

## 2018-03-05 RX ADMIN — LIDOCAINE 1 PATCH: 4 CREAM TOPICAL at 12:34

## 2018-03-05 RX ADMIN — Medication 2.5 MILLIGRAM(S): at 10:01

## 2018-03-05 RX ADMIN — SEVELAMER CARBONATE 800 MILLIGRAM(S): 2400 POWDER, FOR SUSPENSION ORAL at 09:28

## 2018-03-05 RX ADMIN — Medication 25 MILLIGRAM(S): at 18:18

## 2018-03-05 RX ADMIN — LIDOCAINE 1 PATCH: 4 CREAM TOPICAL at 00:26

## 2018-03-05 RX ADMIN — SEVELAMER CARBONATE 800 MILLIGRAM(S): 2400 POWDER, FOR SUSPENSION ORAL at 18:18

## 2018-03-05 RX ADMIN — CLOPIDOGREL BISULFATE 75 MILLIGRAM(S): 75 TABLET, FILM COATED ORAL at 12:34

## 2018-03-05 RX ADMIN — Medication 650 MILLIGRAM(S): at 22:19

## 2018-03-05 RX ADMIN — Medication 3 MILLIGRAM(S): at 22:18

## 2018-03-05 RX ADMIN — LATANOPROST 1 DROP(S): 0.05 SOLUTION/ DROPS OPHTHALMIC; TOPICAL at 22:19

## 2018-03-05 RX ADMIN — ATORVASTATIN CALCIUM 80 MILLIGRAM(S): 80 TABLET, FILM COATED ORAL at 22:18

## 2018-03-05 RX ADMIN — Medication 100 MILLIGRAM(S): at 00:28

## 2018-03-05 RX ADMIN — AMIODARONE HYDROCHLORIDE 400 MILLIGRAM(S): 400 TABLET ORAL at 05:40

## 2018-03-05 NOTE — PROGRESS NOTE ADULT - SUBJECTIVE AND OBJECTIVE BOX
HPI:  Patient is 77-year-old he has a history of hypertension, high cholesterol, coronary artery disease, chronic left bundle branch block, history of bladder cancer status post left nephrectomy in the past, carotid artery disease he has nonobstructive carotid artery disease by MRA of the neck in April 2017 he has  50-60% left ICA stenosis, aortic abdominal aneurysm and he status post endovascular repair in January 2018, his  postoperative course was complicated by development of congestive heart failure and his echocardiogram has revealed left ventricular ejection fraction of approximately 25%, LVEF now 45%,  he is status post cardiac catheterization and he status post stent placement to proximal LAD, diagonal 2 and 1 circumflex. He had  drug-eluting stent placed. He  now has end-stage renal disease and requires hemodialysis. He was also treated for pneumonia & paroxysmal atrial fibrillation recently   . After admission  to [facility] patient had episodes of atrial fibrillation with rapid ventricular response.   He was started on amiodarone, with that he has stayed in normal sinus rhythm. He has had no further episode of atrial fibrillation. He is comfortable and he continues to be hemodynamically stable. He denies any chest pain or shortness of breath. His INR is elevated, he did not receive any warfarin yesterday. There is no headache, blurred vision, abdominal pain, blood in the urine, stool or black colored stool.         Transthoracic Echocardiogram (02.26.18   Summary     Endocardium is not always well visualized, however, overall left   ventricular systolic functionappears mildly decreased, with mild   hypokinesis of the anterolateral wall. Paradoxical septal motion is also   present. Estimated left ventricular ejection fraction is 45 %.   Significant fibrocalcific changes noted to the aortic valve leaflets with   restriction in leaflet excursion. Peak transaortic gradient is 15 mmHg;   this finding is consistent with mild aortic stenosis.   Trace aortic regurgitation is present.   The mitral valve leaflets appear thickened.   Moderate mitral annular calcification is present.   Trace mitral regurgitation is present.   EA reversal of the mitral inflow consistent with reduced compliance of   the   left ventricle.   A mild to moderate sized pericardial effusion is present.    MEDICATIONS  (STANDING):  amiodarone    Tablet 400 milliGRAM(s) Oral daily  atorvastatin 80 milliGRAM(s) Oral at bedtime  clopidogrel Tablet 75 milliGRAM(s) Oral daily  latanoprost 0.005% Ophthalmic Solution 1 Drop(s) Both EYES at bedtime  lidocaine   Patch 1 Patch Transdermal daily  lidocaine   Patch 1 Patch Transdermal daily  melatonin 3 milliGRAM(s) Oral at bedtime  metoprolol     tartrate 25 milliGRAM(s) Oral two times a day  phytonadione   Solution 2.5 milliGRAM(s) Oral once  sevelamer hydrochloride 800 milliGRAM(s) Oral three times a day with meals  warfarin 3 milliGRAM(s) Oral daily    MEDICATIONS  (PRN):  acetaminophen   Tablet. 650 milliGRAM(s) Oral every 6 hours PRN Mild Pain (1 - 3)  diazepam    Tablet 5 milliGRAM(s) Oral two times a day PRN anxiety/agitation  docusate sodium 100 milliGRAM(s) Oral two times a day PRN Constipation      REVIEW OF SYSTEM:  Pertinent items are noted in HPI.  Constitutional	Negative for chills, fevers, sweats.    Eyes: 	Negative for visual disturbance.  Ears, nose, mouth, throat, and face: Negative for epistaxis, nasal congestion, sore throat .  Neck:	Negative for enlargement, pain and difficulty in swallowing  Respiration : Negative for cough, dyspnea on exertion, pleuritic chest pain and wheezing  Cardiovascular: Negative for chest pain, dyspnea and palpitations    Gastrointestinal : Negative for abdominal pain, diarrhea, nausea and vomiting  Genitourinary: Negative for dysuria, frequency and urinary incontinence .  Skin: Negative for  rash, pruritus, swelling, dryness .  	  Hematologic/lymphatic: Negative for bleeding and easy bruising    Neurological: Negative for dizziness, headaches, seizures and tremors  Behavioral/Psych: Negative for mood change, depression.  Endocrine:	Negative for blurry vision, polydipsia and polyuria, diaphoresis.   Allergic/Immunologic:	Negative for anaphylaxis, angioedema and urticaria.      Vital Signs Last 24 Hrs  T(C): 36.5 (05 Mar 2018 09:31), Max: 36.8 (04 Mar 2018 16:21)  T(F): 97.7 (05 Mar 2018 09:31), Max: 98.3 (04 Mar 2018 16:21)  HR: 80 (05 Mar 2018 09:31) (80 - 89)  BP: 119/58 (05 Mar 2018 09:31) (119/58 - 155/68)  BP(mean): --  RR: 17 (05 Mar 2018 09:31) (16 - 17)  SpO2: 99% (05 Mar 2018 09:31) (98% - 100%)          PHYSICAL EXAM  General Appearance: cooperative, no acute distress,   HEENT: PERRL, conjunctiva clear, EOM's intact, non injected pharynx, no exudate . l  Neck: Supple, , no adenopathy, thyroid: not enlarged, no carotid bruit or JVD  Lungs: Diminished breath sounds on both sides there are no rales or rhonchi's.  Heart: Regular rate and rhythm, S1, S2 normal, no murmur, rub or gallop  Abdomen: Soft, non-tender, bowel sounds active , no hepatosplenomegaly  Extremities: no cyanosis or edema, no joint swelling  Skin: Skin color, texture normal, no rashes   Neurologic: Alert and oriented X3 , cranial nerves intact, sensory and motor normal,        INTERPRETATION OF TELEMETRY: NSR            LABS:                          8.8    7.0   )-----------( 298      ( 05 Mar 2018 06:26 )             27.6     03-05    135  |  101  |  59<H>  ----------------------------<  92  4.1   |  24  |  6.80<H>    Ca    8.4<L>      05 Mar 2018 06:26  Phos  3.9     03-05    TPro  x   /  Alb  1.9<L>  /  TBili  x   /  DBili  x   /  AST  x   /  ALT  x   /  AlkPhos  x   03-05            PT/INR - ( 05 Mar 2018 06:26 )   PT: 56.9 sec;   INR: 5.10 ratio         PTT - ( 05 Mar 2018 06:26 )  PTT:42.2 sec

## 2018-03-05 NOTE — PROGRESS NOTE ADULT - SUBJECTIVE AND OBJECTIVE BOX
HPI:  76 y/o M admitted with c/o fall and found here in er to have pneumonia with acute decompensated systolic heart failure, pleural effusion     PMH -Complicated - hx AAA 5.5 cm s/p EVAAR repair with stents 0n 1/29/18 [facility] with incidental finding of Right renal neoplasm, s/p Left Nephrectomy for renal cancer 2008, Bladder Cancer, LBBB, admitted on Feb. 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    #Review of system- rest of review of systems are negative except as mentioned in HPI    PHYSICAL EXAM:    Vital Signs Last 24 Hrs  T(C): 36.5 (05 Mar 2018 09:31), Max: 36.8 (04 Mar 2018 16:21)  T(F): 97.7 (05 Mar 2018 09:31), Max: 98.3 (04 Mar 2018 16:21)  HR: 80 (05 Mar 2018 09:31) (80 - 89)  BP: 119/58 (05 Mar 2018 09:31) (119/58 - 155/68)  BP(mean): --  RR: 17 (05 Mar 2018 09:31) (16 - 17)  SpO2: 99% (05 Mar 2018 09:31) (98% - 100%)    GENERAL: comfortable   HEAD:  Atraumatic, Normocephalic  EYES: EOMI, PERRLA, conjunctiva and sclera clear  HEENT: Moist mucous membranes  NECK: Supple, No JVD  NERVOUS SYSTEM:  Alert & Oriented X3, Motor Strength 5/5 B/L upper and lower extremities; DTRs 2+ intact and symmetric  CHEST/LUNG: Clear to auscultation bilaterally; No rales, rhonchi, wheezing, or rubs  HEART:S1S2 normal, no murmer  ABDOMEN: Soft, Nontender, Nondistended; Bowel sounds present  GENITOURINARY- Voiding, no palpable bladder  EXTREMITIES:  2+ Peripheral Pulses, No clubbing, cyanosis, or edema  MUSCULOSKELTAL- No muscle tenderness, Muscle tone normal, No joint tenderness, no Joint swelling, Joint range of motion-normal  SKIN-no rash,   PSYCH- Mood stable  LYMPH Node- No palpable lymph node                                                  8.8    7.0   )-----------( 298      ( 05 Mar 2018 06:26 )             27.6     03-05    135  |  101  |  59<H>  ----------------------------<  92  4.1   |  24  |  6.80<H>    Ca    8.4<L>      05 Mar 2018 06:26  Phos  3.9     03-05    TPro  x   /  Alb  1.9<L>  /  TBili  x   /  DBili  x   /  AST  x   /  ALT  x   /  AlkPhos  x   03-05    LIVER FUNCTIONS - ( 05 Mar 2018 06:26 )  Alb: 1.9 g/dL / Pro: x     / ALK PHOS: x     / ALT: x     / AST: x     / GGT: x             PT/INR - ( 05 Mar 2018 06:26 )   PT: 56.9 sec;   INR: 5.10 ratio         PTT - ( 05 Mar 2018 06:26 )  PTT:42.2 sec      PT/INR - ( 05 Mar 2018 06:26 )   PT: 56.9 sec;   INR: 5.10 ratio         PTT - ( 05 Mar 2018 06:26 )  PTT:42.2 sec  CAPILLARY BLOOD GLUCOSE      MEDICATIONS  (STANDING):  amiodarone    Tablet 400 milliGRAM(s) Oral daily  atorvastatin 80 milliGRAM(s) Oral at bedtime  clopidogrel Tablet 75 milliGRAM(s) Oral daily  latanoprost 0.005% Ophthalmic Solution 1 Drop(s) Both EYES at bedtime  lidocaine   Patch 1 Patch Transdermal daily  lidocaine   Patch 1 Patch Transdermal daily  melatonin 3 milliGRAM(s) Oral at bedtime  metoprolol     tartrate 25 milliGRAM(s) Oral two times a day  phytonadione   Solution 2.5 milliGRAM(s) Oral once  sevelamer hydrochloride 800 milliGRAM(s) Oral three times a day with meals  warfarin 3 milliGRAM(s) Oral daily    MEDICATIONS  (PRN):  acetaminophen   Tablet. 650 milliGRAM(s) Oral every 6 hours PRN Mild Pain (1 - 3)  diazepam    Tablet 5 milliGRAM(s) Oral two times a day PRN anxiety/agitation  docusate sodium 100 milliGRAM(s) Oral two times a day PRN Constipation

## 2018-03-05 NOTE — PROGRESS NOTE ADULT - ASSESSMENT
A/P    #Acute decompensated systolic heart failure -now appears compensated   -Maximal removal of fluids during HD as possible   -supportive care     #Afib -on amiodarone   -ct coumadin as per inr   -CHADS2-3- no need to bridge   -elevated reading today- give vitamin K 2.5 mg and monitor for any worsening clinically particularly in view of his pericardial effusion status- discussed with Dr. Jones    #pericardial effusion -repeat echo noted and as per Dr. Jones, its resolving. monitor pt for today in view of this finding and elevated inr     #h/o Occluded right renal artery - doppler study noted- discussed with Dr. Saul. Pt and family decided to follow up at [facility] for further management     #Fall- PT and supportive care     #Right renal mass- out pt management     #dvt pr -on coumadin     #discharge plan for tomorrow to rehab after HD and after discussion with Dr. Jones    #Above discussed with pt and all questions have been answered     #discharge plan for tomorrow to [facility]

## 2018-03-05 NOTE — PROGRESS NOTE ADULT - SUBJECTIVE AND OBJECTIVE BOX
Patient is a 77y Male who reports no complaints overnight. Eager to leave, states ambulating without issues.    REVIEW OF SYSTEMS:    CONSTITUTIONAL: Stable weakness, no fevers or chills  RESPIRATORY: No cough, wheezing, hemoptysis; No shortness of breath  CARDIOVASCULAR: No chest pain or palpitations  GENITOURINARY: No dysuria, frequency or hematuria  All other review of systems is negative unless indicated above.    MEDICATIONS  (STANDING):  amiodarone    Tablet 400 milliGRAM(s) Oral daily  atorvastatin 80 milliGRAM(s) Oral at bedtime  clopidogrel Tablet 75 milliGRAM(s) Oral daily  latanoprost 0.005% Ophthalmic Solution 1 Drop(s) Both EYES at bedtime  lidocaine   Patch 1 Patch Transdermal daily  lidocaine   Patch 1 Patch Transdermal daily  melatonin 3 milliGRAM(s) Oral at bedtime  metoprolol     tartrate 25 milliGRAM(s) Oral two times a day  phytonadione   Solution 2.5 milliGRAM(s) Oral once  sevelamer hydrochloride 800 milliGRAM(s) Oral three times a day with meals  warfarin 3 milliGRAM(s) Oral daily    MEDICATIONS  (PRN):  acetaminophen   Tablet. 650 milliGRAM(s) Oral every 6 hours PRN Mild Pain (1 - 3)  diazepam    Tablet 5 milliGRAM(s) Oral two times a day PRN anxiety/agitation  docusate sodium 100 milliGRAM(s) Oral two times a day PRN Constipation        T(C): , Max: 36.8 (03-04-18 @ 16:21)  T(F): , Max: 98.3 (03-04-18 @ 16:21)  HR: 89 (03-05-18 @ 05:05)  BP: 155/68 (03-05-18 @ 05:05)  BP(mean): --  RR: 16 (03-04-18 @ 19:48)  SpO2: 98% (03-05-18 @ 05:05)  Wt(kg): --    03-04 @ 07:01  -  03-05 @ 07:00  --------------------------------------------------------  IN: 540 mL / OUT: 0 mL / NET: 540 mL          PHYSICAL EXAM:    Constitutional: NAD, frail  HEENT: PERRLA, EOMI,  MMM  Neck: No LAD, No JVD  Respiratory: good aeration b/l  Cardiovascular: S1 and S2, RRR  Gastrointestinal: BS+, soft, NT/ND  Extremities: No peripheral edema  Neurological: A/O x 3, no focal deficits  Psychiatric: Normal mood, normal affect  : No Vallecillo  Skin: No rashes  Access: nasir cath R        LABS:                        8.8    7.0   )-----------( 298      ( 05 Mar 2018 06:26 )             27.6     05 Mar 2018 06:26    135    |  101    |  59     ----------------------------<  92     4.1     |  24     |  6.80   04 Mar 2018 05:37    136    |  102    |  43     ----------------------------<  93     3.7     |  28     |  4.90   03 Mar 2018 07:30    137    |  101    |  70     ----------------------------<  121    4.3     |  25     |  6.87   02 Mar 2018 07:35    137    |  102    |  42     ----------------------------<  104    4.2     |  24     |  5.08   01 Mar 2018 11:30    133    |  100    |  66     ----------------------------<  113    4.6     |  26     |  7.29     Ca    8.4        05 Mar 2018 06:26  Ca    8.5        04 Mar 2018 05:37  Ca    8.5        03 Mar 2018 07:30  Ca    8.5        02 Mar 2018 07:35  Ca    8.5        01 Mar 2018 11:30  Phos  3.9       05 Mar 2018 06:26  Phos  3.2       04 Mar 2018 05:37  Phos  4.2       03 Mar 2018 07:30  Phos  3.6       02 Mar 2018 07:35  Phos  3.5       01 Mar 2018 11:30    TPro  x      /  Alb  1.9    /  TBili  x      /  DBili  x      /  AST  x      /  ALT  x      /  AlkPhos  x      05 Mar 2018 06:26  TPro  x      /  Alb  2.0    /  TBili  x      /  DBili  x      /  AST  x      /  ALT  x      /  AlkPhos  x      04 Mar 2018 05:37  TPro  x      /  Alb  1.9    /  TBili  x      /  DBili  x      /  AST  x      /  ALT  x      /  AlkPhos  x      03 Mar 2018 07:30  TPro  x      /  Alb  1.8    /  TBili  x      /  DBili  x      /  AST  x      /  ALT  x      /  AlkPhos  x      02 Mar 2018 07:35  TPro  x      /  Alb  2.1    /  TBili  x      /  DBili  x      /  AST  x      /  ALT  x      /  AlkPhos  x      01 Mar 2018 11:30          Urine Studies:          RADIOLOGY & ADDITIONAL STUDIES:

## 2018-03-05 NOTE — PROGRESS NOTE ADULT - ASSESSMENT
77 with recent EVAAR on 1/29/18 [facility], hx of Left Nephrectomy for renal cancer 2008, Bladder Cancer, EVAAR ,re admitted on Feb. 4, 2018 for Acute Systolic CHF requiring HD in a, Afib on coumadin, HTN, HLD, CAD occlusion of Right renal artery  after EVAAR presents from [facility] after fall.     DONNIE after acute renal event post EVAAR w solitary kidney    -emains HD dependent - HD TTS schedule   -if no recovery eventual AVF, no avenue for stenosis bypass then AVF   -slotted for [facility] HD unit   -Renal imaging noted, daughter luis is in discussions with Dr. Funes for outpatient evaluation of the stenotic doppler lesion (failed attempt to be intervened on at [facility]) -     Hypotension w pericardial effusions  -UF as tolerated, difficult to optimize volume at this point with tachycardia  -Dr. Jones following    Renal mass   -Follows with Dr. Calderon urology, d/c with daughter Luis via phone rise vs benefits of of JANNA in the setting of renal cyst. She reports that the cyst is not malignant and favors starting JANNA understanding the risks. She reports that she will speak with Dr. Calderon and further clarify if ok to start therapy    Anemia  -s/p PRBC on 2/27   -as above    d/c with daughter via phone at length

## 2018-03-05 NOTE — PROGRESS NOTE ADULT - ASSESSMENT
Chronic systolic/diastolic congestive heart failure. He is improving.  Paroxysmal atrial fibrillation. He is now in normal sinus rhythm.  Left pleural effusion.  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 Effusion is resolving .  Coronary artery disease status post multiple stents January 2018.  Aortic abdominal aneurysm status post endovascular repair.  Hypertension.  End-stage renal disease he is on hemodialysis.  Renal and bladder cancer in the past status post surgery.  Anemia.  Elevated INR  Suggest  Observe on telemetry.  Vitamin K today. Adjust INR 2  Continue with current cardiac medications   .  Continue with other cardiac meds.  Follow-up with electrolytes.  Follow up with H/H.  Follow low sodium low cholesterol diet.  Gradusl ambulation.  Discussed with the hospitalist .

## 2018-03-06 LAB
ALBUMIN SERPL ELPH-MCNC: 2 G/DL — LOW (ref 3.3–5)
ANION GAP SERPL CALC-SCNC: 11 MMOL/L — SIGNIFICANT CHANGE UP (ref 5–17)
APTT BLD: 36.1 SEC — SIGNIFICANT CHANGE UP (ref 27.5–37.4)
BUN SERPL-MCNC: 76 MG/DL — HIGH (ref 7–23)
CALCIUM SERPL-MCNC: 8.5 MG/DL — SIGNIFICANT CHANGE UP (ref 8.5–10.1)
CHLORIDE SERPL-SCNC: 100 MMOL/L — SIGNIFICANT CHANGE UP (ref 96–108)
CO2 SERPL-SCNC: 25 MMOL/L — SIGNIFICANT CHANGE UP (ref 22–31)
CREAT SERPL-MCNC: 8.8 MG/DL — HIGH (ref 0.5–1.3)
GLUCOSE SERPL-MCNC: 106 MG/DL — HIGH (ref 70–99)
HCT VFR BLD CALC: 26 % — LOW (ref 39–50)
HGB BLD-MCNC: 8.2 G/DL — LOW (ref 13–17)
INR BLD: 1.77 RATIO — HIGH (ref 0.88–1.16)
MCHC RBC-ENTMCNC: 29.4 PG — SIGNIFICANT CHANGE UP (ref 27–34)
MCHC RBC-ENTMCNC: 31.5 GM/DL — LOW (ref 32–36)
MCV RBC AUTO: 93.2 FL — SIGNIFICANT CHANGE UP (ref 80–100)
NRBC # BLD: 0 /100 WBCS — SIGNIFICANT CHANGE UP (ref 0–0)
PHOSPHATE SERPL-MCNC: 3.6 MG/DL — SIGNIFICANT CHANGE UP (ref 2.5–4.5)
PLATELET # BLD AUTO: 276 K/UL — SIGNIFICANT CHANGE UP (ref 150–400)
POTASSIUM SERPL-MCNC: 4.5 MMOL/L — SIGNIFICANT CHANGE UP (ref 3.5–5.3)
POTASSIUM SERPL-SCNC: 4.5 MMOL/L — SIGNIFICANT CHANGE UP (ref 3.5–5.3)
PROTHROM AB SERPL-ACNC: 19.3 SEC — HIGH (ref 9.8–12.7)
RBC # BLD: 2.79 M/UL — LOW (ref 4.2–5.8)
RBC # FLD: 13.9 % — SIGNIFICANT CHANGE UP (ref 10.3–14.5)
SODIUM SERPL-SCNC: 136 MMOL/L — SIGNIFICANT CHANGE UP (ref 135–145)
WBC # BLD: 7.13 K/UL — SIGNIFICANT CHANGE UP (ref 3.8–10.5)
WBC # FLD AUTO: 7.13 K/UL — SIGNIFICANT CHANGE UP (ref 3.8–10.5)

## 2018-03-06 RX ORDER — FLUTICASONE PROPIONATE 50 MCG
1 SPRAY, SUSPENSION NASAL
Qty: 0 | Refills: 0 | Status: DISCONTINUED | OUTPATIENT
Start: 2018-03-06 | End: 2018-03-12

## 2018-03-06 RX ORDER — WARFARIN SODIUM 2.5 MG/1
2 TABLET ORAL ONCE
Qty: 0 | Refills: 0 | Status: COMPLETED | OUTPATIENT
Start: 2018-03-06 | End: 2018-03-06

## 2018-03-06 RX ORDER — AMIODARONE HYDROCHLORIDE 400 MG/1
200 TABLET ORAL DAILY
Qty: 0 | Refills: 0 | Status: DISCONTINUED | OUTPATIENT
Start: 2018-03-06 | End: 2018-03-12

## 2018-03-06 RX ADMIN — SEVELAMER CARBONATE 800 MILLIGRAM(S): 2400 POWDER, FOR SUSPENSION ORAL at 08:42

## 2018-03-06 RX ADMIN — Medication 650 MILLIGRAM(S): at 19:52

## 2018-03-06 RX ADMIN — Medication 5 MILLIGRAM(S): at 21:41

## 2018-03-06 RX ADMIN — WARFARIN SODIUM 3 MILLIGRAM(S): 2.5 TABLET ORAL at 21:37

## 2018-03-06 RX ADMIN — AMIODARONE HYDROCHLORIDE 200 MILLIGRAM(S): 400 TABLET ORAL at 17:58

## 2018-03-06 RX ADMIN — Medication 1 SPRAY(S): at 05:03

## 2018-03-06 RX ADMIN — AMIODARONE HYDROCHLORIDE 400 MILLIGRAM(S): 400 TABLET ORAL at 05:03

## 2018-03-06 RX ADMIN — WARFARIN SODIUM 2 MILLIGRAM(S): 2.5 TABLET ORAL at 21:37

## 2018-03-06 RX ADMIN — SEVELAMER CARBONATE 800 MILLIGRAM(S): 2400 POWDER, FOR SUSPENSION ORAL at 17:58

## 2018-03-06 RX ADMIN — SEVELAMER CARBONATE 800 MILLIGRAM(S): 2400 POWDER, FOR SUSPENSION ORAL at 11:27

## 2018-03-06 RX ADMIN — CLOPIDOGREL BISULFATE 75 MILLIGRAM(S): 75 TABLET, FILM COATED ORAL at 11:26

## 2018-03-06 RX ADMIN — ATORVASTATIN CALCIUM 80 MILLIGRAM(S): 80 TABLET, FILM COATED ORAL at 21:37

## 2018-03-06 RX ADMIN — LIDOCAINE 1 PATCH: 4 CREAM TOPICAL at 11:26

## 2018-03-06 RX ADMIN — Medication 1 SPRAY(S): at 17:59

## 2018-03-06 RX ADMIN — Medication 3 MILLIGRAM(S): at 21:37

## 2018-03-06 RX ADMIN — LIDOCAINE 1 PATCH: 4 CREAM TOPICAL at 11:27

## 2018-03-06 RX ADMIN — Medication 5 MILLIGRAM(S): at 05:02

## 2018-03-06 RX ADMIN — Medication 25 MILLIGRAM(S): at 05:03

## 2018-03-06 RX ADMIN — Medication 25 MILLIGRAM(S): at 17:58

## 2018-03-06 RX ADMIN — LATANOPROST 1 DROP(S): 0.05 SOLUTION/ DROPS OPHTHALMIC; TOPICAL at 21:37

## 2018-03-06 NOTE — PROGRESS NOTE ADULT - ASSESSMENT
1) Acute Decompensated Heart Failure  2) Abnormal CT Chest  3) Pulmonary Edema  4) Bilateral Pleural Effusions  5) ESRD  6) Dyspnea    3/6/18  78 y/o M hx AAA 5.5 cm s/p EVAAR repair with stents 0n 1/29/18 [facility] with incidental finding of Right renal neoplasm, s/p Left Nephrectomy for renal cancer 2008, Bladder Cancer, LBBB, admitted on Feb. 4, 2018 for ADHF due to Acute Systolic CHF-Echo 2/18/18 EF ~25%,  Discussed findings of CT chest/pleural effusions with family  Etiology is likely secondary to heart failure/volume overload from ESRD (on TThS schedule)  Agree with Cardiology/Nephrology recommendations  ABX d/c 2/26  Blood cultures NGTD  Continue HD per renal as pericardial/pleural effusions are uremic in nature  Goal SaO2 >88%  PT/OT  Thank you for the consult  Will continue to monitor

## 2018-03-06 NOTE — PROGRESS NOTE ADULT - ASSESSMENT
Chronic systolic/diastolic congestive heart failure. He is improving.  Paroxysmal atrial fibrillation. He is now in normal sinus rhythm.  Left pleural effusion.  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 Effusion is resolving .  Coronary artery disease status post multiple stents January 2018.  Aortic abdominal aneurysm status post endovascular repair.  Hypertension.  End-stage renal disease he is on hemodialysis.  Renal and bladder cancer in the past status post surgery.  Anemia.  Suggest  Continue with current cardiac medications   .  Adjust INR 2   Decrease amiodarone to 200 mg by mouth once daily.  Follow-up with electrolytes.  Follow up with H/H.  Follow low sodium low cholesterol diet.  Gradusl ambulation.  Discussed with the hospitalist .  Discussed with patient's daughter.

## 2018-03-06 NOTE — PROGRESS NOTE ADULT - SUBJECTIVE AND OBJECTIVE BOX
CC:  Patient is a 77y old  Male who presents with a chief complaint of Fall and left leg and chest wall pain (25 Feb 2018 09:47)    SUBJECTIVE:      ROS:      acetaminophen   Tablet. 650 milliGRAM(s) Oral every 6 hours PRN  amiodarone    Tablet 200 milliGRAM(s) Oral daily  atorvastatin 80 milliGRAM(s) Oral at bedtime  clopidogrel Tablet 75 milliGRAM(s) Oral daily  diazepam    Tablet 5 milliGRAM(s) Oral two times a day PRN  docusate sodium 100 milliGRAM(s) Oral two times a day PRN  fluticasone propionate 50 MICROgram(s)/spray Nasal Spray 1 Spray(s) Both Nostrils two times a day  latanoprost 0.005% Ophthalmic Solution 1 Drop(s) Both EYES at bedtime  lidocaine   Patch 1 Patch Transdermal daily  lidocaine   Patch 1 Patch Transdermal daily  melatonin 3 milliGRAM(s) Oral at bedtime  metoprolol     tartrate 25 milliGRAM(s) Oral two times a day  sevelamer hydrochloride 800 milliGRAM(s) Oral three times a day with meals  warfarin 3 milliGRAM(s) Oral daily    T(C): 36 (03-06-18 @ 13:30), Max: 36.7 (03-05-18 @ 17:43)  HR: 90 (03-06-18 @ 15:58) (83 - 92)  BP: 138/80 (03-06-18 @ 15:58) (125/81 - 152/81)  RR: 17 (03-06-18 @ 15:58) (17 - 18)  SpO2: 95% (03-06-18 @ 10:09) (95% - 99%)    PHYSICAL EXAM:                        8.2    7.13  )-----------( 276      ( 06 Mar 2018 13:30 )             26.0     03-06    136  |  100  |  76<H>  ----------------------------<  106<H>  4.5   |  25  |  8.80<H>    Ca    8.5      06 Mar 2018 13:30  Phos  3.6     03-06    TPro  x   /  Alb  2.0<L>  /  TBili  x   /  DBili  x   /  AST  x   /  ALT  x   /  AlkPhos  x   03-06 CC:  Patient is a 77y old  Male who presents with a chief complaint of Fall and left leg and chest wall pain (25 Feb 2018 09:47)    SUBJECTIVE:  HD.  "I feel like crap."    ROS:  as above.    acetaminophen   Tablet. 650 milliGRAM(s) Oral every 6 hours PRN  amiodarone    Tablet 200 milliGRAM(s) Oral daily  atorvastatin 80 milliGRAM(s) Oral at bedtime  clopidogrel Tablet 75 milliGRAM(s) Oral daily  diazepam    Tablet 5 milliGRAM(s) Oral two times a day PRN  docusate sodium 100 milliGRAM(s) Oral two times a day PRN  fluticasone propionate 50 MICROgram(s)/spray Nasal Spray 1 Spray(s) Both Nostrils two times a day  latanoprost 0.005% Ophthalmic Solution 1 Drop(s) Both EYES at bedtime  lidocaine   Patch 1 Patch Transdermal daily  lidocaine   Patch 1 Patch Transdermal daily  melatonin 3 milliGRAM(s) Oral at bedtime  metoprolol     tartrate 25 milliGRAM(s) Oral two times a day  sevelamer hydrochloride 800 milliGRAM(s) Oral three times a day with meals  warfarin 3 milliGRAM(s) Oral daily    T(C): 36 (03-06-18 @ 13:30), Max: 36.7 (03-05-18 @ 17:43)  HR: 90 (03-06-18 @ 15:58) (83 - 92)  BP: 138/80 (03-06-18 @ 15:58) (125/81 - 152/81)  RR: 17 (03-06-18 @ 15:58) (17 - 18)  SpO2: 95% (03-06-18 @ 10:09) (95% - 99%)    PHYSICAL EXAM:  appears comfortable.  nontoxic.  lungs clear.  chest right sided HD catheter.  heart regular.  abd soft.  NT.  ND.  ext no edema.  neuro nonfocal.                        8.2    7.13  )-----------( 276      ( 06 Mar 2018 13:30 )             26.0     03-06    136  |  100  |  76<H>  ----------------------------<  106<H>  4.5   |  25  |  8.80<H>    Ca    8.5      06 Mar 2018 13:30  Phos  3.6     03-06    TPro  x   /  Alb  2.0<L>  /  TBili  x   /  DBili  x   /  AST  x   /  ALT  x   /  AlkPhos  x   03-06

## 2018-03-06 NOTE — PROGRESS NOTE ADULT - SUBJECTIVE AND OBJECTIVE BOX
HPI:  Patient is 77-year-old he has a history of hypertension, high cholesterol, coronary artery disease, chronic left bundle branch block, history of bladder cancer status post left nephrectomy in the past, carotid artery disease he has nonobstructive carotid artery disease by MRA of the neck in April 2017 he has  50-60% left ICA stenosis, aortic abdominal aneurysm and he status post endovascular repair in January 2018, his  postoperative course was complicated by development of congestive heart failure and his echocardiogram has revealed left ventricular ejection fraction of approximately 25%, LVEF now 45%,  he is status post cardiac catheterization and he status post stent placement to proximal LAD, diagonal 2 and 1 circumflex. He had  drug-eluting stent placed. He  now has end-stage renal disease and requires hemodialysis. He was also treated for pneumonia & paroxysmal atrial fibrillation recently   . After admission  to [facility] patient had episodes of atrial fibrillation with rapid ventricular response.   He was started on amiodarone, with that he has stayed in normal sinus rhythm. He has had no further episode of atrial fibrillation. He is comfortable and he continues to be hemodynamically stable. He denies any chest pain or shortness of breath.  He is afebrile. His lab results are still pending today.         Transthoracic Echocardiogram (02.26.18   Summary     Endocardium is not always well visualized, however, overall left   ventricular systolic functionappears mildly decreased, with mild   hypokinesis of the anterolateral wall. Paradoxical septal motion is also   present. Estimated left ventricular ejection fraction is 45 %.   Significant fibrocalcific changes noted to the aortic valve leaflets with   restriction in leaflet excursion. Peak transaortic gradient is 15 mmHg;   this finding is consistent with mild aortic stenosis.   Trace aortic regurgitation is present.   The mitral valve leaflets appear thickened.   Moderate mitral annular calcification is present.   Trace mitral regurgitation is present.   EA reversal of the mitral inflow consistent with reduced compliance of   the   left ventricle.   A mild to moderate sized pericardial effusion is present.    MEDICATIONS  (STANDING):  amiodarone    Tablet 200 milliGRAM(s) Oral daily  atorvastatin 80 milliGRAM(s) Oral at bedtime  clopidogrel Tablet 75 milliGRAM(s) Oral daily  fluticasone propionate 50 MICROgram(s)/spray Nasal Spray 1 Spray(s) Both Nostrils two times a day  latanoprost 0.005% Ophthalmic Solution 1 Drop(s) Both EYES at bedtime  lidocaine   Patch 1 Patch Transdermal daily  lidocaine   Patch 1 Patch Transdermal daily  melatonin 3 milliGRAM(s) Oral at bedtime  metoprolol     tartrate 25 milliGRAM(s) Oral two times a day  sevelamer hydrochloride 800 milliGRAM(s) Oral three times a day with meals  warfarin 3 milliGRAM(s) Oral daily    MEDICATIONS  (PRN):  acetaminophen   Tablet. 650 milliGRAM(s) Oral every 6 hours PRN Mild Pain (1 - 3)  diazepam    Tablet 5 milliGRAM(s) Oral two times a day PRN anxiety/agitation  docusate sodium 100 milliGRAM(s) Oral two times a day PRN Constipation      REVIEW OF SYSTEM:  Pertinent items are noted in HPI.  Constitutional	Negative for chills, fevers, sweats.    Eyes: 	Negative for visual disturbance.  Ears, nose, mouth, throat, and face: Negative for epistaxis, nasal congestion, sore throat .  Neck:	Negative for enlargement, pain and difficulty in swallowing  Respiration : Negative for cough, dyspnea on exertion, pleuritic chest pain and wheezing  Cardiovascular: Negative for chest pain, dyspnea and palpitations    Gastrointestinal : Negative for abdominal pain, diarrhea, nausea and vomiting  Genitourinary: Negative for dysuria, frequency and urinary incontinence .  Skin: Negative for  rash, pruritus, swelling, dryness .  	  Hematologic/lymphatic: Negative for bleeding and easy bruising  Musculoskeletal: Negative for arthralgias, back pain and muscle weakness.  Neurological: Negative for dizziness, headaches, seizures and tremors  Behavioral/Psych: Negative for mood change, depression.  Endocrine:	Negative for blurry vision, polydipsia and polyuria, diaphoresis.   Allergic/Immunologic:	Negative for anaphylaxis, angioedema and urticaria.      Vital Signs Last 24 Hrs  T(C): 36.2 (06 Mar 2018 05:00), Max: 36.7 (05 Mar 2018 17:43)  T(F): 97.2 (06 Mar 2018 05:00), Max: 98 (05 Mar 2018 17:43)  HR: 84 (06 Mar 2018 05:00) (83 - 88)  BP: 149/69 (06 Mar 2018 05:00) (134/78 - 152/81)  BP(mean): --  RR: 17 (06 Mar 2018 05:00) (17 - 17)  SpO2: 98% (05 Mar 2018 21:38) (98% - 99%)      PHYSICAL EXAM  General Appearance: cooperative, no acute distress,   HEENT: PERRL, conjunctiva clear, EOM's intact, non injected pharynx, no exudate .   Neck: Supple, , no adenopathy, thyroid: not enlarged, no carotid bruit or JVD    Lungs: Clear to auscultation bilateral, no  adventitious breath sounds, normal   expiratory phase  Heart: Regular rate and rhythm, S1, S2 normal, no murmur, rub or gallop  Abdomen: Soft, non-tender, bowel sounds active , no hepatosplenomegaly  Extremities: no cyanosis or edema, no joint swelling  Skin: Skin color, texture normal, no rashes   Neurologic: Alert and oriented X3 , cranial nerves intact, sensory and motor normal,            LABS:                          8.8    7.0   )-----------( 298      ( 05 Mar 2018 06:26 )             27.6     03-05    135  |  101  |  59<H>  ----------------------------<  92  4.1   |  24  |  6.80<H>    Ca    8.4<L>      05 Mar 2018 06:26  Phos  3.9     03-05    TPro  x   /  Alb  1.9<L>  /  TBili  x   /  DBili  x   /  AST  x   /  ALT  x   /  AlkPhos  x   03-05            PT/INR - ( 05 Mar 2018 06:26 )   PT: 56.9 sec;   INR: 5.10 ratio         PTT - ( 05 Mar 2018 06:26 )  PTT:42.2 sec

## 2018-03-06 NOTE — PROGRESS NOTE ADULT - ASSESSMENT
77 with recent EVAAR on 1/29/18 [facility], hx of Left Nephrectomy for renal cancer 2008, Bladder Cancer, EVAAR ,re admitted on Feb. 4, 2018 for Acute Systolic CHF requiring HD in a, Afib on coumadin, HTN, HLD, CAD occlusion of Right renal artery  after EVAAR presents from [facility] after fall.     DONNIE after acute renal event post EVAAR w solitary kidney    - remains HD dependent - HD TTS schedule   - if no recovery eventual AVF if no avenue for renal stenosis bypass - daughter seeking second opnion at [facility]    - Renal imaging noted, erik smith is in discussions with Dr. Funes for outpatient evaluation of the stenotic doppler lesion (failed attempt to be intervened on at [facility])    - awaiitng HD ouitpt placement     Hypotension w pericardial effusions  - UF as tolerated, difficult to optimize volume at this point with tachycardia  - Dr. Jones following  - hold Metoprolol prior to HD       Renal mass   -Follows with Dr. Caldeorn urology, d/c with erik Smith via phone rise vs benefits of of JANNA in the setting of renal cyst. She reports that the cyst is not malignant and favors starting JANNA understanding the risks. She reports that she will speak with Dr. Calderon and further clarify if ok to start therapy    Anemia  -s/p PRBC on 2/27   -as above

## 2018-03-06 NOTE — PROGRESS NOTE ADULT - ASSESSMENT
77M.  admitted 02/25/18.  presented to ED due to PN and acute decompensated HF.    #Acute decompensated systolic heart failure -now appears compensated   -Maximal removal of fluids during HD as possible   -supportive care     #Afib -on amiodarone   -ct coumadin as per inr   -CHADS2-3- no need to bridge   -elevated reading today- give vitamin K 2.5 mg and monitor for any worsening clinically particularly in view of his pericardial effusion status- discussed with Dr. Jones    #pericardial effusion -repeat echo noted and as per Dr. Jones, its resolving. monitor pt for today in view of this finding and elevated inr     #h/o Occluded right renal artery - doppler study noted- discussed with Dr. Saul. Pt and family decided to follow up at [facility] center for further management     #Fall- PT and supportive care     #Right renal mass- out pt management     #dvt pr -on coumadin     #discharge plan for tomorrow to rehab after HD and after discussion with Dr. Jones    #Above discussed with pt and all questions have been answered     #discharge plan for tomorrow to [facility] 77M.  admitted 02/25/18.  presented to ED due to PN and acute decompensated HF.    acute decompensated HFrEF.  -now appears compensated   -Maximal removal of fluids during HD as possible   -supportive care     AF.  -on amiodarone   -INR subtherapeutic.  give warfarin 5mg po tonight.  -CHADS2-3- no need to bridge     pericardial effusion.  -monitor for any worsening clinically particularly in view of his pericardial effusion status.    Occluded right renal artery - doppler study noted- discussed with Dr. Saul. Pt and family decided to follow up at [facility] center for further management     Fall- PT and supportive care     Right renal mass- out pt management     dvt pr -on coumadin     discharge plan for tomorrow in AM to [facility].

## 2018-03-06 NOTE — PROGRESS NOTE ADULT - SUBJECTIVE AND OBJECTIVE BOX
Patient is a 77y old  Male who presents with a chief complaint of Fall and left leg and chest wall pain (25 Feb 2018 09:47)      HPI:  76 y/o M hx AAA 5.5 cm s/p EVAAR repair with stents 0n 1/29/18 [facility] with incidental finding of Right renal neoplasm, s/p Left Nephrectomy for renal cancer 2008, Bladder Cancer, LBBB, admitted on Feb. 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 presents from [facility] after fall. Pt reports he was walking from bed to close his door around 3:00 AM and did not use his walker and fell. He denies hitting head or LOC. Only c/o pain to Left chest wall. Denies CP/SOB, light headedness. Reports he only wants XR and refusing blood work on arrival.   Patient was seen with daughter at bedside and sister. Patient denies nausea, vomiting, bleeding or dysuria. Patient has been making some urine now. No diarrhea or abdominal pain or headache. Some cough. (25 Feb 2018 09:47)      3/6/18  No acute events occurred      PAST MEDICAL & SURGICAL HISTORY:  CAD (coronary artery disease)  History of heart artery stent: DIONICIO  Presence of stent in LAD coronary artery  History of nephrectomy: Left  S/P AAA repair      PREVIOUS DIAGNOSTIC TESTING:      MEDICATIONS  (STANDING):  amiodarone    Tablet 400 milliGRAM(s) Oral daily  atorvastatin 80 milliGRAM(s) Oral at bedtime  clopidogrel Tablet 75 milliGRAM(s) Oral daily  fluticasone propionate 50 MICROgram(s)/spray Nasal Spray 1 Spray(s) Both Nostrils two times a day  latanoprost 0.005% Ophthalmic Solution 1 Drop(s) Both EYES at bedtime  lidocaine   Patch 1 Patch Transdermal daily  lidocaine   Patch 1 Patch Transdermal daily  melatonin 3 milliGRAM(s) Oral at bedtime  metoprolol     tartrate 25 milliGRAM(s) Oral two times a day  sevelamer hydrochloride 800 milliGRAM(s) Oral three times a day with meals  warfarin 3 milliGRAM(s) Oral daily    MEDICATIONS  (PRN):  acetaminophen   Tablet. 650 milliGRAM(s) Oral every 6 hours PRN Mild Pain (1 - 3)  diazepam    Tablet 5 milliGRAM(s) Oral two times a day PRN anxiety/agitation  docusate sodium 100 milliGRAM(s) Oral two times a day PRN Constipation        REVIEW OF SYSTEM:  Dyspnea, fatigue, otherwise 12 point ROS Negative     Vital Signs Last 24 Hrs  T(C): 36.2 (06 Mar 2018 05:00), Max: 36.7 (05 Mar 2018 17:43)  T(F): 97.2 (06 Mar 2018 05:00), Max: 98 (05 Mar 2018 17:43)  HR: 84 (06 Mar 2018 05:00) (80 - 88)  BP: 149/69 (06 Mar 2018 05:00) (119/58 - 152/81)  BP(mean): --  RR: 17 (06 Mar 2018 05:00) (17 - 17)  SpO2: 98% (05 Mar 2018 21:38) (98% - 99%)    I&O's Summary    PHYSICAL EXAM  General Appearance: cooperative, no acute distress,   HEENT: PERRL, conjunctiva clear, EOM's intact, non injected pharynx, no exudate, TM   normal  Neck: Supple, , no adenopathy, thyroid: not enlarged, no carotid bruit or JVD  Back: Symmetric, no  tenderness,no soft tissue tenderness  Lungs: Coarse bilaterally   Heart: Regular rate and rhythm, S1, S2 normal, no murmur, rub or gallop  Abdomen: Soft, non-tender, bowel sounds active , no hepatosplenomegaly  Extremities: no cyanosis or edema, no joint swelling  Skin: Skin color, texture normal, no rashes   Neurologic: Alert and oriented X3 , cranial nerves intact, sensory and motor normal,    ECG:    LABS:               03-05    135  |  101  |  59<H>  ----------------------------<  92  4.1   |  24  |  6.80<H>    Ca    8.4<L>      05 Mar 2018 06:26  Phos  3.9     03-05    TPro  x   /  Alb  1.9<L>  /  TBili  x   /  DBili  x   /  AST  x   /  ALT  x   /  AlkPhos  x   03-05    Ca    8.6      26 Feb 2018 06:47  Phos  3.5     02-26    TPro  7.1  /  Alb  2.2<L>  /  TBili  0.5  /  DBili  x   /  AST  29  /  ALT  28  /  AlkPhos  71  02-26            PT/INR - ( 26 Feb 2018 06:47 )   PT: 45.0 sec;   INR: 4.05 ratio         PTT - ( 25 Feb 2018 05:23 )  PTT:39.3 sec          RADIOLOGY & ADDITIONAL STUDIES:

## 2018-03-06 NOTE — PROGRESS NOTE ADULT - SUBJECTIVE AND OBJECTIVE BOX
Patient is a 77y Male who reports no complaints overnight.     seen on HD   eager to leave   states ambulating without issues.      REVIEW OF SYSTEMS:    CONSTITUTIONAL: Stable weakness, no fevers or chills  RESPIRATORY: No cough, wheezing, hemoptysis; No shortness of breath  CARDIOVASCULAR: No chest pain or palpitations  GENITOURINARY: No dysuria, frequency or hematuria  All other review of systems is negative unless indicated above.    MEDICATIONS  (STANDING):  amiodarone    Tablet 200 milliGRAM(s) Oral daily  atorvastatin 80 milliGRAM(s) Oral at bedtime  clopidogrel Tablet 75 milliGRAM(s) Oral daily  fluticasone propionate 50 MICROgram(s)/spray Nasal Spray 1 Spray(s) Both Nostrils two times a day  latanoprost 0.005% Ophthalmic Solution 1 Drop(s) Both EYES at bedtime  lidocaine   Patch 1 Patch Transdermal daily  lidocaine   Patch 1 Patch Transdermal daily  melatonin 3 milliGRAM(s) Oral at bedtime  metoprolol     tartrate 25 milliGRAM(s) Oral two times a day  sevelamer hydrochloride 800 milliGRAM(s) Oral three times a day with meals  warfarin 3 milliGRAM(s) Oral daily    Vital Signs Last 24 Hrs  T(C): 36 (06 Mar 2018 13:30), Max: 36.7 (05 Mar 2018 17:43)  T(F): 96.8 (06 Mar 2018 13:30), Max: 98 (05 Mar 2018 17:43)  HR: 86 (06 Mar 2018 13:30) (83 - 88)  BP: 125/81 (06 Mar 2018 13:30) (125/81 - 152/81)  BP(mean): --  RR: 18 (06 Mar 2018 13:30) (17 - 18)  SpO2: 95% (06 Mar 2018 10:09) (95% - 99%)        PHYSICAL EXAM:    Constitutional: NAD, frail  HEENT: PERRLA, EOMI,  MMM  Neck: No LAD, No JVD  Respiratory: good aeration b/l  Cardiovascular: S1 and S2, RRR  Gastrointestinal: BS+, soft, NT/ND  Extremities: No peripheral edema  Neurological: A/O x 3, no focal deficits  Psychiatric: Normal mood, normal affect  : No Vallecillo  Skin: No rashes  Access: nasir cath R        LABS:                                        8.2    7.13  )-----------( 276      ( 06 Mar 2018 13:30 )             26.0                         8.8    7.0   )-----------( 298      ( 05 Mar 2018 06:26 )             27.6     136    |  100    |  76     ----------------------------<  106       06 Mar 2018 13:30  4.5     |  25     |  8.80     135    |  101    |  59     ----------------------------<  92        05 Mar 2018 06:26  4.1     |  24     |  6.80     136    |  102    |  43     ----------------------------<  93        04 Mar 2018 05:37  3.7     |  28     |  4.90     Ca    8.5        06 Mar 2018 13:30  Ca    8.4        05 Mar 2018 06:26    Phos  3.6       06 Mar 2018 13:30  Phos  3.9       05 Mar 2018 06:26    Urine Studies:      RADIOLOGY & ADDITIONAL STUDIES:

## 2018-03-07 LAB
ALBUMIN SERPL ELPH-MCNC: 2.1 G/DL — LOW (ref 3.3–5)
ANION GAP SERPL CALC-SCNC: 10 MMOL/L — SIGNIFICANT CHANGE UP (ref 5–17)
BUN SERPL-MCNC: 32 MG/DL — HIGH (ref 7–23)
CALCIUM SERPL-MCNC: 8.5 MG/DL — SIGNIFICANT CHANGE UP (ref 8.5–10.1)
CHLORIDE SERPL-SCNC: 101 MMOL/L — SIGNIFICANT CHANGE UP (ref 96–108)
CO2 SERPL-SCNC: 26 MMOL/L — SIGNIFICANT CHANGE UP (ref 22–31)
CREAT SERPL-MCNC: 5.14 MG/DL — HIGH (ref 0.5–1.3)
GLUCOSE SERPL-MCNC: 89 MG/DL — SIGNIFICANT CHANGE UP (ref 70–99)
HCT VFR BLD CALC: 27 % — LOW (ref 39–50)
HGB BLD-MCNC: 8.5 G/DL — LOW (ref 13–17)
INR BLD: 1.86 RATIO — HIGH (ref 0.88–1.16)
MCHC RBC-ENTMCNC: 29.2 PG — SIGNIFICANT CHANGE UP (ref 27–34)
MCHC RBC-ENTMCNC: 31.5 GM/DL — LOW (ref 32–36)
MCV RBC AUTO: 92.8 FL — SIGNIFICANT CHANGE UP (ref 80–100)
NRBC # BLD: 0 /100 WBCS — SIGNIFICANT CHANGE UP (ref 0–0)
PHOSPHATE SERPL-MCNC: 2.7 MG/DL — SIGNIFICANT CHANGE UP (ref 2.5–4.5)
PLATELET # BLD AUTO: 283 K/UL — SIGNIFICANT CHANGE UP (ref 150–400)
POTASSIUM SERPL-MCNC: 4 MMOL/L — SIGNIFICANT CHANGE UP (ref 3.5–5.3)
POTASSIUM SERPL-SCNC: 4 MMOL/L — SIGNIFICANT CHANGE UP (ref 3.5–5.3)
PROTHROM AB SERPL-ACNC: 20.3 SEC — HIGH (ref 9.8–12.7)
RBC # BLD: 2.91 M/UL — LOW (ref 4.2–5.8)
RBC # FLD: 13.8 % — SIGNIFICANT CHANGE UP (ref 10.3–14.5)
SODIUM SERPL-SCNC: 137 MMOL/L — SIGNIFICANT CHANGE UP (ref 135–145)
WBC # BLD: 6.92 K/UL — SIGNIFICANT CHANGE UP (ref 3.8–10.5)
WBC # FLD AUTO: 6.92 K/UL — SIGNIFICANT CHANGE UP (ref 3.8–10.5)

## 2018-03-07 RX ORDER — AMLODIPINE BESYLATE 2.5 MG/1
2.5 TABLET ORAL DAILY
Qty: 0 | Refills: 0 | Status: DISCONTINUED | OUTPATIENT
Start: 2018-03-07 | End: 2018-03-12

## 2018-03-07 RX ORDER — WARFARIN SODIUM 2.5 MG/1
2 TABLET ORAL ONCE
Qty: 0 | Refills: 0 | Status: COMPLETED | OUTPATIENT
Start: 2018-03-07 | End: 2018-03-07

## 2018-03-07 RX ADMIN — ATORVASTATIN CALCIUM 80 MILLIGRAM(S): 80 TABLET, FILM COATED ORAL at 22:56

## 2018-03-07 RX ADMIN — AMLODIPINE BESYLATE 2.5 MILLIGRAM(S): 2.5 TABLET ORAL at 13:56

## 2018-03-07 RX ADMIN — AMIODARONE HYDROCHLORIDE 200 MILLIGRAM(S): 400 TABLET ORAL at 05:55

## 2018-03-07 RX ADMIN — Medication 5 MILLIGRAM(S): at 22:41

## 2018-03-07 RX ADMIN — LIDOCAINE 1 PATCH: 4 CREAM TOPICAL at 13:55

## 2018-03-07 RX ADMIN — Medication 1 SPRAY(S): at 05:55

## 2018-03-07 RX ADMIN — LIDOCAINE 1 PATCH: 4 CREAM TOPICAL at 00:00

## 2018-03-07 RX ADMIN — Medication 25 MILLIGRAM(S): at 17:11

## 2018-03-07 RX ADMIN — LATANOPROST 1 DROP(S): 0.05 SOLUTION/ DROPS OPHTHALMIC; TOPICAL at 22:56

## 2018-03-07 RX ADMIN — SEVELAMER CARBONATE 800 MILLIGRAM(S): 2400 POWDER, FOR SUSPENSION ORAL at 13:54

## 2018-03-07 RX ADMIN — Medication 5 MILLIGRAM(S): at 00:22

## 2018-03-07 RX ADMIN — WARFARIN SODIUM 3 MILLIGRAM(S): 2.5 TABLET ORAL at 22:56

## 2018-03-07 RX ADMIN — SEVELAMER CARBONATE 800 MILLIGRAM(S): 2400 POWDER, FOR SUSPENSION ORAL at 17:10

## 2018-03-07 RX ADMIN — CLOPIDOGREL BISULFATE 75 MILLIGRAM(S): 75 TABLET, FILM COATED ORAL at 13:58

## 2018-03-07 RX ADMIN — Medication 3 MILLIGRAM(S): at 22:56

## 2018-03-07 RX ADMIN — Medication 25 MILLIGRAM(S): at 05:55

## 2018-03-07 RX ADMIN — WARFARIN SODIUM 2 MILLIGRAM(S): 2.5 TABLET ORAL at 22:56

## 2018-03-07 RX ADMIN — Medication 1 SPRAY(S): at 17:10

## 2018-03-07 NOTE — PROGRESS NOTE ADULT - SUBJECTIVE AND OBJECTIVE BOX
Patient is a 77y Male who reports no complaints overnight. ambulating well per daughter Angela    REVIEW OF SYSTEMS:    CONSTITUTIONAL: No weakness, fevers or chills  RESPIRATORY: No cough, wheezing, hemoptysis; No shortness of breath  CARDIOVASCULAR: No chest pain or palpitations  GENITOURINARY: No dysuria, frequency or hematuria  All other review of systems is negative unless indicated above.    MEDICATIONS  (STANDING):  amiodarone    Tablet 200 milliGRAM(s) Oral daily  amLODIPine   Tablet 2.5 milliGRAM(s) Oral daily  atorvastatin 80 milliGRAM(s) Oral at bedtime  clopidogrel Tablet 75 milliGRAM(s) Oral daily  fluticasone propionate 50 MICROgram(s)/spray Nasal Spray 1 Spray(s) Both Nostrils two times a day  latanoprost 0.005% Ophthalmic Solution 1 Drop(s) Both EYES at bedtime  lidocaine   Patch 1 Patch Transdermal daily  lidocaine   Patch 1 Patch Transdermal daily  melatonin 3 milliGRAM(s) Oral at bedtime  metoprolol     tartrate 25 milliGRAM(s) Oral two times a day  sevelamer hydrochloride 800 milliGRAM(s) Oral three times a day with meals  warfarin 3 milliGRAM(s) Oral daily    MEDICATIONS  (PRN):  acetaminophen   Tablet. 650 milliGRAM(s) Oral every 6 hours PRN Mild Pain (1 - 3)  diazepam    Tablet 5 milliGRAM(s) Oral two times a day PRN anxiety/agitation  docusate sodium 100 milliGRAM(s) Oral two times a day PRN Constipation        T(C): , Max: 37 (03-07-18 @ 04:52)  T(F): , Max: 98.6 (03-07-18 @ 04:52)  HR: 94 (03-07-18 @ 09:44)  BP: 133/77 (03-07-18 @ 09:44)  BP(mean): --  RR: 17 (03-07-18 @ 09:44)  SpO2: 93% (03-07-18 @ 09:44)  Wt(kg): --        PHYSICAL EXAM:    Constitutional: NAD, frail  HEENT: PERRLA, EOMI,  MMM  Neck: No LAD, No JVD  Respiratory: CTAB  Cardiovascular: S1 and S2, RRR  Gastrointestinal: BS+, soft, NT/ND  Extremities: No peripheral edema  Neurological: A/O x 3, no focal deficits  Psychiatric: Normal mood, normal affect  : No Vallecillo  Skin: No rashes  Access: nasir [facility]        LABS:                        8.5    6.92  )-----------( 283      ( 07 Mar 2018 05:36 )             27.0     07 Mar 2018 05:36    137    |  101    |  32     ----------------------------<  89     4.0     |  26     |  5.14   06 Mar 2018 13:30    136    |  100    |  76     ----------------------------<  106    4.5     |  25     |  8.80   05 Mar 2018 06:26    135    |  101    |  59     ----------------------------<  92     4.1     |  24     |  6.80   04 Mar 2018 05:37    136    |  102    |  43     ----------------------------<  93     3.7     |  28     |  4.90     Ca    8.5        07 Mar 2018 05:36  Ca    8.5        06 Mar 2018 13:30  Ca    8.4        05 Mar 2018 06:26  Ca    8.5        04 Mar 2018 05:37  Phos  2.7       07 Mar 2018 05:36  Phos  3.6       06 Mar 2018 13:30  Phos  3.9       05 Mar 2018 06:26  Phos  3.2       04 Mar 2018 05:37    TPro  x      /  Alb  2.1    /  TBili  x      /  DBili  x      /  AST  x      /  ALT  x      /  AlkPhos  x      07 Mar 2018 05:36  TPro  x      /  Alb  2.0    /  TBili  x      /  DBili  x      /  AST  x      /  ALT  x      /  AlkPhos  x      06 Mar 2018 13:30  TPro  x      /  Alb  1.9    /  TBili  x      /  DBili  x      /  AST  x      /  ALT  x      /  AlkPhos  x      05 Mar 2018 06:26  TPro  x      /  Alb  2.0    /  TBili  x      /  DBili  x      /  AST  x      /  ALT  x      /  AlkPhos  x      04 Mar 2018 05:37          Urine Studies:          RADIOLOGY & ADDITIONAL STUDIES:

## 2018-03-07 NOTE — PROGRESS NOTE ADULT - ASSESSMENT
Chronic systolic/diastolic congestive heart failure. He is improving.  Paroxysmal atrial fibrillation. He is now in normal sinus rhythm.  Left pleural effusion.  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 Effusion is resolving .  Coronary artery disease status post multiple stents January 2018.  Aortic abdominal aneurysm status post endovascular repair.  Hypertension.  End-stage renal disease he is on hemodialysis.  Renal and bladder cancer in the past status post surgery.  Anemia.  Suggest  Continue with current cardiac medications   .  Adjust INR 2   Start Norvasc 2.5milligram by mouth once daily.  Follow-up with electrolytes.  Follow up with H/H.  Follow low sodium low cholesterol diet.  Gradusl ambulation.  Discussed with the hospitalist .  Discussed with patient's daughter.  I shall now follow-up when necessary.  Patient has an appointment for follow-up in a few weeks.

## 2018-03-07 NOTE — PROGRESS NOTE ADULT - ASSESSMENT
77M.  admitted 02/25/18.  presented to ED due to PN and acute decompensated HF.    acute decompensated HFrEF.  -now appears compensated   -Maximal removal of fluids during HD as possible   -supportive care     AF.  -on amiodarone   -INR subtherapeutic.  give warfarin 5mg po tonight.  -CHADS2-3- no need to bridge     pericardial effusion.  -monitor for any worsening clinically particularly in view of his pericardial effusion status.    Occluded right renal artery - doppler study noted- discussed with Dr. Saul. Pt and family decided to follow up at [facility] for further management     Fall- PT and supportive care.  check xray of right hip, r/o fx.    Right renal mass- out pt management     dvt pr -on coumadin     disposition.  -family would like to explore [facility] options.  -d/c telemetry and transfer to the [facility].

## 2018-03-07 NOTE — PROGRESS NOTE ADULT - SUBJECTIVE AND OBJECTIVE BOX
HPI:  Patient is 77-year-old he has a history of hypertension, high cholesterol, coronary artery disease, chronic left bundle branch block, history of bladder cancer status post left nephrectomy in the past, carotid artery disease he has nonobstructive carotid artery disease by MRA of the neck in April 2017 he has  50-60% left ICA stenosis, aortic abdominal aneurysm and he status post endovascular repair in January 2018, his  postoperative course was complicated by development of congestive heart failure and his echocardiogram has revealed left ventricular ejection fraction of approximately 25%, LVEF now 45%,  he is status post cardiac catheterization and he status post stent placement to proximal LAD, diagonal 2 and 1 circumflex. He had  drug-eluting stent placed. He  now has end-stage renal disease and requires hemodialysis. He was also treated for pneumonia & paroxysmal atrial fibrillation recently   . After admission  to [facility] patient had episodes of atrial fibrillation with rapid ventricular response.   He was started on amiodarone, with that he has stayed in normal sinus rhythm. He has had no further episode of atrial fibrillation. He is comfortable and he continues to be hemodynamically stable. He denies any chest pain or shortness of breath.  He is afebrile. His BP is mildly elevated.       Transthoracic Echocardiogram (02.26.18   Summary     Endocardium is not always well visualized, however, overall left   ventricular systolic functionappears mildly decreased, with mild   hypokinesis of the anterolateral wall. Paradoxical septal motion is also   present. Estimated left ventricular ejection fraction is 45 %.   Significant fibrocalcific changes noted to the aortic valve leaflets with   restriction in leaflet excursion. Peak transaortic gradient is 15 mmHg;   this finding is consistent with mild aortic stenosis.   Trace aortic regurgitation is present.   The mitral valve leaflets appear thickened.   Moderate mitral annular calcification is present.   Trace mitral regurgitation is present.   EA reversal of the mitral inflow consistent with reduced compliance of   the   left ventricle.   A mild to moderate sized pericardial effusion is present.          MEDICATIONS  (STANDING):  amiodarone    Tablet 200 milliGRAM(s) Oral daily  amLODIPine   Tablet 2.5 milliGRAM(s) Oral daily  atorvastatin 80 milliGRAM(s) Oral at bedtime  clopidogrel Tablet 75 milliGRAM(s) Oral daily  fluticasone propionate 50 MICROgram(s)/spray Nasal Spray 1 Spray(s) Both Nostrils two times a day  latanoprost 0.005% Ophthalmic Solution 1 Drop(s) Both EYES at bedtime  lidocaine   Patch 1 Patch Transdermal daily  lidocaine   Patch 1 Patch Transdermal daily  melatonin 3 milliGRAM(s) Oral at bedtime  metoprolol     tartrate 25 milliGRAM(s) Oral two times a day  sevelamer hydrochloride 800 milliGRAM(s) Oral three times a day with meals  warfarin 3 milliGRAM(s) Oral daily    MEDICATIONS  (PRN):  acetaminophen   Tablet. 650 milliGRAM(s) Oral every 6 hours PRN Mild Pain (1 - 3)  diazepam    Tablet 5 milliGRAM(s) Oral two times a day PRN anxiety/agitation  docusate sodium 100 milliGRAM(s) Oral two times a day PRN Constipation        REVIEW OF SYSTEM:  Pertinent items are noted in HPI.  Constitutional	Negative for chills, fevers, sweats.    Eyes: 	Negative for visual disturbance.  Ears, nose, mouth, throat, and face: Negative for epistaxis, nasal congestion, sore throat .  Neck:	Negative for enlargement, pain and difficulty in swallowing  Respiration : Negative for cough, dyspnea on exertion, pleuritic chest pain and wheezing  Cardiovascular: Negative for chest pain, dyspnea and palpitations    Gastrointestinal : Negative for abdominal pain, diarrhea, nausea and vomiting  Genitourinary: Negative for dysuria, frequency and urinary incontinence .  Skin: Negative for  rash, pruritus, swelling, dryness .  	  Hematologic/lymphatic: Negative for bleeding and easy bruising    Neurological: Negative for dizziness, headaches, seizures and tremors  Behavioral/Psych: Negative for mood change, depression.  Endocrine:	Negative for blurry vision, polydipsia and polyuria, diaphoresis.   Allergic/Immunologic:	Negative for anaphylaxis, angioedema and urticaria.      Vital Signs Last 24 Hrs  T(C): 37 (07 Mar 2018 04:52), Max: 37 (07 Mar 2018 04:52)  T(F): 98.6 (07 Mar 2018 04:52), Max: 98.6 (07 Mar 2018 04:52)  HR: 85 (07 Mar 2018 04:52) (85 - 93)  BP: 144/73 (07 Mar 2018 04:52) (125/81 - 162/85)  BP(mean): --  RR: 16 (07 Mar 2018 04:52) (16 - 18)  SpO2: 94% (07 Mar 2018 04:52) (94% - 95%)    I&O's Summary    PHYSICAL EXAM  General Appearance: cooperative, no acute distress,   HEENT: PERRL, conjunctiva clear, EOM's intact, non injected pharynx, no exudate .   Neck: Supple, , no adenopathy, thyroid: not enlarged, no carotid bruit or JVD  Back: Symmetric, no  tenderness,no soft tissue tenderness  Lungs: Dimensions on both sides no rales or rhonchi's.  Heart: Regular rate and rhythm, S1, S2 normal, no murmur, rub or gallop  Abdomen: Soft, non-tender, bowel sounds active , no hepatosplenomegaly  Extremities: no cyanosis or edema, no joint swelling  Skin: Skin color, texture normal, no rashes   Neurologic: Alert and oriented X3 , cranial nerves intact, sensory and motor normal,                LABS:                          8.5    6.92  )-----------( 283      ( 07 Mar 2018 05:36 )             27.0     03-07    137  |  101  |  32<H>  ----------------------------<  89  4.0   |  26  |  5.14<H>    Ca    8.5      07 Mar 2018 05:36  Phos  2.7     03-07    TPro  x   /  Alb  2.1<L>  /  TBili  x   /  DBili  x   /  AST  x   /  ALT  x   /  AlkPhos  x   03-07            PT/INR - ( 07 Mar 2018 05:36 )   PT: 20.3 sec;   INR: 1.86 ratio         PTT - ( 06 Mar 2018 13:30 )  PTT:36.1 sec

## 2018-03-07 NOTE — PROGRESS NOTE ADULT - ASSESSMENT
77 with recent EVAAR on 1/29/18 [facility], hx of Left Nephrectomy for renal cancer 2008, Bladder Cancer, EVAAR ,re admitted on Feb. 4, 2018 for Acute Systolic CHF requiring HD in a, Afib on coumadin, HTN, HLD, CAD occlusion of Right renal artery  [facility]     DONNIE after acute renal event post EVAAR w solitary kidney    - remains HD dependent - HD TTS schedule   - daughter luis is in discussions with Dr. Funes for outpatient evaluation of the stenotic doppler lesion (failed attempt to be intervened on at [facility])    - awaiitng HD ouitpt placement   -Avf they will get with Dr. Funes vs in [facility] based on above     Hypotension w pericardial effusions  - UF as tolerated, difficult to optimize volume at this point with tachycardia  - Dr. Jones following  - hold Metoprolol prior to HD     Anemia  -s/p PRBC on 2/27   -await daughter/patients decisions in concert with Dr. Calderon    d/c with daughter Angela  d/c with staff  d/c with SW

## 2018-03-07 NOTE — PROGRESS NOTE ADULT - SUBJECTIVE AND OBJECTIVE BOX
CC:  Patient is a 77y old  Male who presents with a chief complaint of Fall and left leg and chest wall pain (25 Feb 2018 09:47)    SUBJECTIVE:  daughter at bedside.  c/o right hip pain when he moved.  fell on left side.  fall was unwitnessed and patient has a poor memory.  xray requested.    ROS:  as above.    acetaminophen   Tablet. 650 milliGRAM(s) Oral every 6 hours PRN  amiodarone    Tablet 200 milliGRAM(s) Oral daily  amLODIPine   Tablet 2.5 milliGRAM(s) Oral daily  atorvastatin 80 milliGRAM(s) Oral at bedtime  clopidogrel Tablet 75 milliGRAM(s) Oral daily  diazepam    Tablet 5 milliGRAM(s) Oral two times a day PRN  docusate sodium 100 milliGRAM(s) Oral two times a day PRN  fluticasone propionate 50 MICROgram(s)/spray Nasal Spray 1 Spray(s) Both Nostrils two times a day  latanoprost 0.005% Ophthalmic Solution 1 Drop(s) Both EYES at bedtime  lidocaine   Patch 1 Patch Transdermal daily  lidocaine   Patch 1 Patch Transdermal daily  melatonin 3 milliGRAM(s) Oral at bedtime  metoprolol     tartrate 25 milliGRAM(s) Oral two times a day  sevelamer hydrochloride 800 milliGRAM(s) Oral three times a day with meals  warfarin 3 milliGRAM(s) Oral daily  warfarin 2 milliGRAM(s) Oral once    T(C): 36.7 (03-07-18 @ 16:30), Max: 37 (03-07-18 @ 04:52)  HR: 91 (03-07-18 @ 16:30) (85 - 94)  BP: 151/90 (03-07-18 @ 16:30) (133/77 - 162/85)  RR: 17 (03-07-18 @ 16:30) (16 - 17)  SpO2: 91% (03-07-18 @ 16:30) (91% - 94%)    PHYSICAL EXAM:  no acute distress.  lungs clear.  (+) right chest wall HD cathater.  heart regular.  abd soft.  NT.  ND.  right hip no ecchymosis.  no crepitations.  (+) tenderness.  ext no edema.                     8.5    6.92  )-----------( 283      ( 07 Mar 2018 05:36 )             27.0     03-07    137  |  101  |  32<H>  ----------------------------<  89  4.0   |  26  |  5.14<H>    Ca    8.5      07 Mar 2018 05:36  Phos  2.7     03-07    TPro  x   /  Alb  2.1<L>  /  TBili  x   /  DBili  x   /  AST  x   /  ALT  x   /  AlkPhos  x   03-07

## 2018-03-08 LAB
ALBUMIN SERPL ELPH-MCNC: 2.1 G/DL — LOW (ref 3.3–5)
ANION GAP SERPL CALC-SCNC: 10 MMOL/L — SIGNIFICANT CHANGE UP (ref 5–17)
BUN SERPL-MCNC: 47 MG/DL — HIGH (ref 7–23)
CALCIUM SERPL-MCNC: 8.5 MG/DL — SIGNIFICANT CHANGE UP (ref 8.5–10.1)
CHLORIDE SERPL-SCNC: 101 MMOL/L — SIGNIFICANT CHANGE UP (ref 96–108)
CO2 SERPL-SCNC: 24 MMOL/L — SIGNIFICANT CHANGE UP (ref 22–31)
CREAT SERPL-MCNC: 7.08 MG/DL — HIGH (ref 0.5–1.3)
GLUCOSE SERPL-MCNC: 82 MG/DL — SIGNIFICANT CHANGE UP (ref 70–99)
HCT VFR BLD CALC: 29.2 % — LOW (ref 39–50)
HGB BLD-MCNC: 9.1 G/DL — LOW (ref 13–17)
INR BLD: 3.23 RATIO — HIGH (ref 0.88–1.16)
MCHC RBC-ENTMCNC: 29 PG — SIGNIFICANT CHANGE UP (ref 27–34)
MCHC RBC-ENTMCNC: 31.2 GM/DL — LOW (ref 32–36)
MCV RBC AUTO: 93 FL — SIGNIFICANT CHANGE UP (ref 80–100)
NRBC # BLD: 0 /100 WBCS — SIGNIFICANT CHANGE UP (ref 0–0)
PHOSPHATE SERPL-MCNC: 3.7 MG/DL — SIGNIFICANT CHANGE UP (ref 2.5–4.5)
PLATELET # BLD AUTO: 283 K/UL — SIGNIFICANT CHANGE UP (ref 150–400)
POTASSIUM SERPL-MCNC: 4.3 MMOL/L — SIGNIFICANT CHANGE UP (ref 3.5–5.3)
POTASSIUM SERPL-SCNC: 4.3 MMOL/L — SIGNIFICANT CHANGE UP (ref 3.5–5.3)
PROTHROM AB SERPL-ACNC: 35.7 SEC — HIGH (ref 9.8–12.7)
RBC # BLD: 3.14 M/UL — LOW (ref 4.2–5.8)
RBC # FLD: 14.1 % — SIGNIFICANT CHANGE UP (ref 10.3–14.5)
SODIUM SERPL-SCNC: 135 MMOL/L — SIGNIFICANT CHANGE UP (ref 135–145)
WBC # BLD: 6.69 K/UL — SIGNIFICANT CHANGE UP (ref 3.8–10.5)
WBC # FLD AUTO: 6.69 K/UL — SIGNIFICANT CHANGE UP (ref 3.8–10.5)

## 2018-03-08 RX ORDER — DIAZEPAM 5 MG
5 TABLET ORAL ONCE
Qty: 0 | Refills: 0 | Status: DISCONTINUED | OUTPATIENT
Start: 2018-03-08 | End: 2018-03-08

## 2018-03-08 RX ORDER — DIPHENHYDRAMINE HCL 50 MG
25 CAPSULE ORAL ONCE
Qty: 0 | Refills: 0 | Status: COMPLETED | OUTPATIENT
Start: 2018-03-08 | End: 2018-03-08

## 2018-03-08 RX ADMIN — Medication 1 SPRAY(S): at 19:54

## 2018-03-08 RX ADMIN — SEVELAMER CARBONATE 800 MILLIGRAM(S): 2400 POWDER, FOR SUSPENSION ORAL at 10:30

## 2018-03-08 RX ADMIN — AMLODIPINE BESYLATE 2.5 MILLIGRAM(S): 2.5 TABLET ORAL at 05:42

## 2018-03-08 RX ADMIN — AMIODARONE HYDROCHLORIDE 200 MILLIGRAM(S): 400 TABLET ORAL at 05:42

## 2018-03-08 RX ADMIN — SEVELAMER CARBONATE 800 MILLIGRAM(S): 2400 POWDER, FOR SUSPENSION ORAL at 17:10

## 2018-03-08 RX ADMIN — ATORVASTATIN CALCIUM 80 MILLIGRAM(S): 80 TABLET, FILM COATED ORAL at 21:51

## 2018-03-08 RX ADMIN — LATANOPROST 1 DROP(S): 0.05 SOLUTION/ DROPS OPHTHALMIC; TOPICAL at 21:51

## 2018-03-08 RX ADMIN — Medication 25 MILLIGRAM(S): at 19:53

## 2018-03-08 RX ADMIN — Medication 1 SPRAY(S): at 05:42

## 2018-03-08 RX ADMIN — LIDOCAINE 1 PATCH: 4 CREAM TOPICAL at 17:09

## 2018-03-08 RX ADMIN — Medication 5 MILLIGRAM(S): at 21:51

## 2018-03-08 RX ADMIN — Medication 25 MILLIGRAM(S): at 21:51

## 2018-03-08 RX ADMIN — CLOPIDOGREL BISULFATE 75 MILLIGRAM(S): 75 TABLET, FILM COATED ORAL at 17:09

## 2018-03-08 RX ADMIN — LIDOCAINE 1 PATCH: 4 CREAM TOPICAL at 00:22

## 2018-03-08 NOTE — PROGRESS NOTE ADULT - ASSESSMENT
77 with recent EVAAR on 1/29/18 [facility], hx of Left Nephrectomy for renal cancer 2008, Bladder Cancer, EVAAR ,re admitted on Feb. 4, 2018 for Acute Systolic CHF requiring HD in a, Afib on coumadin, HTN, HLD, CAD occlusion of Right renal artery after EVAAR presents from [facility] after fall.     DONNIE after acute renal event post EVAAR w solitary kidney    - remains HD dependent    - daughter luis is in discussions with Dr. Funes for outpatient evaluation of the stenotic doppler lesion (failed attempt to be intervened on at [facility])    - awaiitng HD ouitpt placement   - Avf may need to be done inpatient to facilitate dischareg, not being accepted by local facilities    Hypotension w pericardial effusions  - UF as tolerated, difficult to optimize volume at this point with tachycardia  - Dr. Jones following  - hold Metoprolol prior to HD     Anemia  -s/p PRBC on 2/27   -await daughter/patients decisions in concert with Dr. Calderon regarding [facility]    d/c with sister  d/c with staff  d/c with SW

## 2018-03-08 NOTE — PROGRESS NOTE ADULT - ASSESSMENT
A/P    #Acute decompensated systolic heart failure -now appears compensated   -Maximal removal of fluids during HD as possible   -supportive care     #Afib -on amiodarone   -ct coumadin as per inr   -CHADS2-3    #pericardial effusion -stable and has improved     #h/o Occluded right renal artery - doppler study noted- discussed with Dr. Saul. Pt and family decided to follow up at [facility] center for further management     #Fall- PT and supportive care     #Right renal mass- out pt management     #dvt pr -on coumadin     #Needs AVF formation prior to discharge    #Above discussed with pt and family at bedside, all questions have been answered

## 2018-03-08 NOTE — DIETITIAN INITIAL EVALUATION ADULT. - PROBLEM SELECTOR PLAN 1
IV Cefepime and Vanco. ID consult. Sputum and blood culture. Patient was recently treated in [facility] for PNA as well.

## 2018-03-08 NOTE — PROGRESS NOTE ADULT - SUBJECTIVE AND OBJECTIVE BOX
HPI:  78 y/o M admitted with c/o fall and found here in er to have pneumonia with acute decompensated systolic heart failure, pleural effusion     PMH -Complicated - hx AAA 5.5 cm s/p EVAAR repair with stents 0n 1/29/18 [facility] with incidental finding of Right renal neoplasm, s/p Left Nephrectomy for renal cancer 2008, Bladder Cancer, LBBB, admitted on Feb. 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    #Review of system- rest of review of systems are negative except as mentioned in HPI    PHYSICAL EXAM:    Vital Signs Last 24 Hrs  T(C): 36.8 (08 Mar 2018 14:04), Max: 37 (07 Mar 2018 20:20)  T(F): 98.2 (08 Mar 2018 14:04), Max: 98.6 (07 Mar 2018 20:20)  HR: 98 (08 Mar 2018 14:04) (85 - 104)  BP: 141/78 (08 Mar 2018 14:04) (132/77 - 151/90)  BP(mean): --  RR: 16 (08 Mar 2018 14:04) (16 - 17)  SpO2: 91% (08 Mar 2018 09:44) (90% - 91%)  GENERAL: comfortable   HEAD:  Atraumatic, Normocephalic  EYES: EOMI, PERRLA, conjunctiva and sclera clear  HEENT: Moist mucous membranes  NECK: Supple, No JVD  NERVOUS SYSTEM:  Alert & Oriented X3, Motor Strength 5/5 B/L upper and lower extremities; DTRs 2+ intact and symmetric  CHEST/LUNG: Clear to auscultation bilaterally; No rales, rhonchi, wheezing, or rubs  HEART:S1S2 normal, no murmer  ABDOMEN: Soft, Nontender, Nondistended; Bowel sounds present  GENITOURINARY- Voiding, no palpable bladder  EXTREMITIES:  2+ Peripheral Pulses, No clubbing, cyanosis, or edema  MUSCULOSKELTAL- No muscle tenderness, Muscle tone normal, No joint tenderness, no Joint swelling, Joint range of motion-normal  SKIN-no rash, no lesion  PSYCH- Mood stable  LYMPH Node- No palpable lymph node                          9.1    6.69  )-----------( 283      ( 08 Mar 2018 06:00 )             29.2     03-08    135  |  101  |  47<H>  ----------------------------<  82  4.3   |  24  |  7.08<H>    Ca    8.5      08 Mar 2018 06:00  Phos  3.7     03-08    TPro  x   /  Alb  2.1<L>  /  TBili  x   /  DBili  x   /  AST  x   /  ALT  x   /  AlkPhos  x   03-08    LIVER FUNCTIONS - ( 08 Mar 2018 06:00 )  Alb: 2.1 g/dL / Pro: x     / ALK PHOS: x     / ALT: x     / AST: x     / GGT: x             PT/INR - ( 08 Mar 2018 06:00 )   PT: 35.7 sec;   INR: 3.23 ratio               PT/INR - ( 08 Mar 2018 06:00 )   PT: 35.7 sec;   INR: 3.23 ratio           CAPILLARY BLOOD GLUCOSE          MEDICATIONS  (STANDING):  amiodarone    Tablet 200 milliGRAM(s) Oral daily  amLODIPine   Tablet 2.5 milliGRAM(s) Oral daily  atorvastatin 80 milliGRAM(s) Oral at bedtime  clopidogrel Tablet 75 milliGRAM(s) Oral daily  fluticasone propionate 50 MICROgram(s)/spray Nasal Spray 1 Spray(s) Both Nostrils two times a day  latanoprost 0.005% Ophthalmic Solution 1 Drop(s) Both EYES at bedtime  lidocaine   Patch 1 Patch Transdermal daily  lidocaine   Patch 1 Patch Transdermal daily  melatonin 3 milliGRAM(s) Oral at bedtime  metoprolol     tartrate 25 milliGRAM(s) Oral two times a day  sevelamer hydrochloride 800 milliGRAM(s) Oral three times a day with meals  warfarin 3 milliGRAM(s) Oral daily    MEDICATIONS  (PRN):  acetaminophen   Tablet. 650 milliGRAM(s) Oral every 6 hours PRN Mild Pain (1 - 3)  docusate sodium 100 milliGRAM(s) Oral two times a day PRN Constipation

## 2018-03-08 NOTE — PROGRESS NOTE ADULT - SUBJECTIVE AND OBJECTIVE BOX
Patient is a 77y Male who reports no complaints overnight. Seen at HD. No UOP    REVIEW OF SYSTEMS:    CONSTITUTIONAL:no fevers or chills  RESPIRATORY: No cough, wheezing, hemoptysis; No shortness of breath  CARDIOVASCULAR: No chest pain or palpitations  GENITOURINARY: No dysuria, frequency or hematuria  All other review of systems is negative unless indicated above.    MEDICATIONS  (STANDING):  amiodarone    Tablet 200 milliGRAM(s) Oral daily  amLODIPine   Tablet 2.5 milliGRAM(s) Oral daily  atorvastatin 80 milliGRAM(s) Oral at bedtime  clopidogrel Tablet 75 milliGRAM(s) Oral daily  fluticasone propionate 50 MICROgram(s)/spray Nasal Spray 1 Spray(s) Both Nostrils two times a day  latanoprost 0.005% Ophthalmic Solution 1 Drop(s) Both EYES at bedtime  lidocaine   Patch 1 Patch Transdermal daily  lidocaine   Patch 1 Patch Transdermal daily  melatonin 3 milliGRAM(s) Oral at bedtime  metoprolol     tartrate 25 milliGRAM(s) Oral two times a day  sevelamer hydrochloride 800 milliGRAM(s) Oral three times a day with meals  warfarin 3 milliGRAM(s) Oral daily    MEDICATIONS  (PRN):  acetaminophen   Tablet. 650 milliGRAM(s) Oral every 6 hours PRN Mild Pain (1 - 3)  docusate sodium 100 milliGRAM(s) Oral two times a day PRN Constipation        T(C): , Max: 37 (03-07-18 @ 20:20)  T(F): , Max: 98.6 (03-07-18 @ 20:20)  HR: 103 (03-08-18 @ 13:20)  BP: 133/74 (03-08-18 @ 13:20)  BP(mean): --  RR: 16 (03-08-18 @ 13:20)  SpO2: 91% (03-08-18 @ 09:44)  Wt(kg): --        PHYSICAL EXAM:    Constitutional: NAD  HEENT: PERRLA, EOMI,  MMM  Neck: No LAD, No JVD  Respiratory: CTAB  Cardiovascular: S1 and S2, RRR  Gastrointestinal: BS+, soft, NT/ND  Extremities: No peripheral edema  Neurological: A/O x 3, no focal deficits  Psychiatric: Normal mood, normal affect  : No Vallecillo  Skin: No rashes  Access: R nasir cath        LABS:                        9.1    6.69  )-----------( 283      ( 08 Mar 2018 06:00 )             29.2     08 Mar 2018 06:00    135    |  101    |  47     ----------------------------<  82     4.3     |  24     |  7.08   07 Mar 2018 05:36    137    |  101    |  32     ----------------------------<  89     4.0     |  26     |  5.14   06 Mar 2018 13:30    136    |  100    |  76     ----------------------------<  106    4.5     |  25     |  8.80   05 Mar 2018 06:26    135    |  101    |  59     ----------------------------<  92     4.1     |  24     |  6.80     Ca    8.5        08 Mar 2018 06:00  Ca    8.5        07 Mar 2018 05:36  Ca    8.5        06 Mar 2018 13:30  Ca    8.4        05 Mar 2018 06:26  Phos  3.7       08 Mar 2018 06:00  Phos  2.7       07 Mar 2018 05:36  Phos  3.6       06 Mar 2018 13:30  Phos  3.9       05 Mar 2018 06:26    TPro  x      /  Alb  2.1    /  TBili  x      /  DBili  x      /  AST  x      /  ALT  x      /  AlkPhos  x      08 Mar 2018 06:00  TPro  x      /  Alb  2.1    /  TBili  x      /  DBili  x      /  AST  x      /  ALT  x      /  AlkPhos  x      07 Mar 2018 05:36  TPro  x      /  Alb  2.0    /  TBili  x      /  DBili  x      /  AST  x      /  ALT  x      /  AlkPhos  x      06 Mar 2018 13:30  TPro  x      /  Alb  1.9    /  TBili  x      /  DBili  x      /  AST  x      /  ALT  x      /  AlkPhos  x      05 Mar 2018 06:26          Urine Studies:          RADIOLOGY & ADDITIONAL STUDIES:

## 2018-03-08 NOTE — DIETITIAN INITIAL EVALUATION ADULT. - ENERGY NEEDS
Ht. 70 "        Wt. 64.4 kg          20 BMI                  IBW  75.5 kg               Pt is at 85 %  IBW

## 2018-03-08 NOTE — DIETITIAN INITIAL EVALUATION ADULT. - OTHER INFO
78yo male with PMH of AAA s/p EVAAR repair with stents, right renal neoplasm s/p left nephrectomy for renal CA, bladder CA, CHF, s/p permacath placement for ESRD on HD, Afib, HTN, HLD, CAD s/p stents admitted s/p fall for HCAP with b/l pleural effusions and acute decompensated HF.  Pending d/c plan to [facility].  Upon visit, pt appears well nourished.  Pt endorses fair appetite, only consuming half of plates and minimal amounts of oral supplement.  d/w pt importance of adequate protein/calorie intake and how oral supplement can aid pt in meeting nutr needs for dialysis.  Pt verbalized understanding.  Based on pt report, pt likely meeting ~75% of estimated nutr needs since admission.  Per EMR, pt with wt loss of ~19.4Kg since admission (24%), clinically significant if accurate.  As pt is being managed for acute exacarbation of CHF and on dialysis, likely part of all of wt loss is fluid related.  Pt currently without any edema noted.  Pt at high risk for malnutrition.  Pt previously provided CHF education; pt reports no questions on education provided.  RECOMMENDATIONS: 1) c/w renal diet Rx 2) change oral supplement to BID to aid with fluid balance 3) encourage oral supplement intake (as pt with stack at bedside) 4) add nephrovite daily to ensure 100% RDI met 5) daily wt checks

## 2018-03-09 LAB
INR BLD: 5.37 RATIO — CRITICAL HIGH (ref 0.88–1.16)
PROTHROM AB SERPL-ACNC: 60 SEC — HIGH (ref 9.8–12.7)

## 2018-03-09 RX ORDER — DIAZEPAM 5 MG
5 TABLET ORAL ONCE
Qty: 0 | Refills: 0 | Status: DISCONTINUED | OUTPATIENT
Start: 2018-03-09 | End: 2018-03-09

## 2018-03-09 RX ADMIN — SEVELAMER CARBONATE 800 MILLIGRAM(S): 2400 POWDER, FOR SUSPENSION ORAL at 13:14

## 2018-03-09 RX ADMIN — SEVELAMER CARBONATE 800 MILLIGRAM(S): 2400 POWDER, FOR SUSPENSION ORAL at 18:18

## 2018-03-09 RX ADMIN — Medication 1 SPRAY(S): at 18:18

## 2018-03-09 RX ADMIN — LIDOCAINE 1 PATCH: 4 CREAM TOPICAL at 13:13

## 2018-03-09 RX ADMIN — AMLODIPINE BESYLATE 2.5 MILLIGRAM(S): 2.5 TABLET ORAL at 06:03

## 2018-03-09 RX ADMIN — Medication 5 MILLIGRAM(S): at 21:30

## 2018-03-09 RX ADMIN — ATORVASTATIN CALCIUM 80 MILLIGRAM(S): 80 TABLET, FILM COATED ORAL at 21:31

## 2018-03-09 RX ADMIN — Medication 100 MILLIGRAM(S): at 13:14

## 2018-03-09 RX ADMIN — Medication 25 MILLIGRAM(S): at 06:03

## 2018-03-09 RX ADMIN — LIDOCAINE 1 PATCH: 4 CREAM TOPICAL at 13:14

## 2018-03-09 RX ADMIN — CLOPIDOGREL BISULFATE 75 MILLIGRAM(S): 75 TABLET, FILM COATED ORAL at 13:14

## 2018-03-09 RX ADMIN — AMIODARONE HYDROCHLORIDE 200 MILLIGRAM(S): 400 TABLET ORAL at 06:03

## 2018-03-09 RX ADMIN — Medication 25 MILLIGRAM(S): at 18:18

## 2018-03-09 RX ADMIN — Medication 1 SPRAY(S): at 06:03

## 2018-03-09 RX ADMIN — Medication 10 MILLIGRAM(S): at 17:00

## 2018-03-09 RX ADMIN — SEVELAMER CARBONATE 800 MILLIGRAM(S): 2400 POWDER, FOR SUSPENSION ORAL at 08:44

## 2018-03-09 RX ADMIN — LATANOPROST 1 DROP(S): 0.05 SOLUTION/ DROPS OPHTHALMIC; TOPICAL at 21:31

## 2018-03-09 RX ADMIN — Medication 3 MILLIGRAM(S): at 21:31

## 2018-03-09 NOTE — PROVIDER CONTACT NOTE (OTHER) - DATE AND TIME:
26-Feb-2018 08:25
26-Feb-2018 08:27
26-Feb-2018 08:31
26-Feb-2018 11:42
09-Mar-2018 15:35
25-Feb-2018 11:45
25-Feb-2018 11:47
25-Feb-2018 11:51
25-Feb-2018 11:55
26-Feb-2018 13:33

## 2018-03-09 NOTE — PROGRESS NOTE ADULT - ASSESSMENT
77 with recent EVAAR on 1/29/18 [facility], hx of Left Nephrectomy for renal cancer 2008, Bladder Cancer, EVAAR ,re admitted on Feb. 4, 2018 for Acute Systolic CHF requiring HD in a, Afib on coumadin, HTN, HLD, CAD occlusion of Right renal artery after EVAAR presents from [facility] after fall.     DONNIE after acute renal event post EVAAR w solitary kidney    - remains HD dependent    - daughter luis is in discussions with Dr. Funes regarding [facility]   - awaiitng HD ouitpt placement, AVF pending vascular    Anemia  -s/p PRBC on 2/27   -await daughter/patients decisions in concert with Dr. Calderon regarding JANNA    CM  -UF as tolerated with HD,limited by bp    d/c with Luis at length  d/c with staff  d/c with SW staff

## 2018-03-09 NOTE — PROGRESS NOTE ADULT - ASSESSMENT
A/P    #acute decompensated HFrEF.  -now appears compensated   -Maximal removal of fluids during HD as possible   -supportive care     #AF  -on amiodarone   -ct coumadin as per inr  -CHADS2-3     #pericardial effusion.  -monitor for any worsening clinically particularly in view of his pericardial effusion status.    #Occluded right renal artery - doppler study noted- As per discussion with Dr. Saul. Pt and family decided to follow up at higher center for further management     #needs AVF formation prior to discharge -vascular surgery evaluation to follow     #Above discussed with pt and all questions have been answered

## 2018-03-09 NOTE — PROGRESS NOTE ADULT - SUBJECTIVE AND OBJECTIVE BOX
Patient is a 77y Male who reports no complaints overnight. No chest pain or SOB.     REVIEW OF SYSTEMS:    CONSTITUTIONAL: No weakness, fevers or chills  RESPIRATORY: No cough, wheezing, hemoptysis; No shortness of breath  CARDIOVASCULAR: No chest pain or palpitations  GENITOURINARY: No dysuria, frequency or hematuria  All other review of systems is negative unless indicated above.    MEDICATIONS  (STANDING):  amiodarone    Tablet 200 milliGRAM(s) Oral daily  amLODIPine   Tablet 2.5 milliGRAM(s) Oral daily  atorvastatin 80 milliGRAM(s) Oral at bedtime  bisacodyl Suppository 10 milliGRAM(s) Rectal two times a day  clopidogrel Tablet 75 milliGRAM(s) Oral daily  fluticasone propionate 50 MICROgram(s)/spray Nasal Spray 1 Spray(s) Both Nostrils two times a day  latanoprost 0.005% Ophthalmic Solution 1 Drop(s) Both EYES at bedtime  lidocaine   Patch 1 Patch Transdermal daily  lidocaine   Patch 1 Patch Transdermal daily  melatonin 3 milliGRAM(s) Oral at bedtime  metoprolol     tartrate 25 milliGRAM(s) Oral two times a day  sevelamer hydrochloride 800 milliGRAM(s) Oral three times a day with meals  warfarin 3 milliGRAM(s) Oral daily    MEDICATIONS  (PRN):  acetaminophen   Tablet. 650 milliGRAM(s) Oral every 6 hours PRN Mild Pain (1 - 3)  docusate sodium 100 milliGRAM(s) Oral two times a day PRN Constipation        T(C): , Max: 37.2 (03-08-18 @ 20:34)  T(F): , Max: 99 (03-08-18 @ 20:34)  HR: 91 (03-09-18 @ 10:20)  BP: 122/76 (03-09-18 @ 10:20)  BP(mean): --  RR: 17 (03-09-18 @ 10:20)  SpO2: 94% (03-09-18 @ 10:20)  Wt(kg): --        PHYSICAL EXAM:    Constitutional: NAD=  HEENT: PERRLA, EOMI,  MMM  Neck: No LAD, No JVD  Respiratory: good aeration  Cardiovascular: S1 and S2, RRR  Gastrointestinal: BS+, soft, NT/ND  Extremities: No peripheral edema  Neurological: A/O x 3, no focal deficits  Psychiatric: Normal mood, normal affect  : No Vallecillo  Skin: No rashes  Access: tunn cath        LABS:                        9.1    6.69  )-----------( 283      ( 08 Mar 2018 06:00 )             29.2     08 Mar 2018 06:00    135    |  101    |  47     ----------------------------<  82     4.3     |  24     |  7.08   07 Mar 2018 05:36    137    |  101    |  32     ----------------------------<  89     4.0     |  26     |  5.14   06 Mar 2018 13:30    136    |  100    |  76     ----------------------------<  106    4.5     |  25     |  8.80     Ca    8.5        08 Mar 2018 06:00  Ca    8.5        07 Mar 2018 05:36  Ca    8.5        06 Mar 2018 13:30  Phos  3.7       08 Mar 2018 06:00  Phos  2.7       07 Mar 2018 05:36  Phos  3.6       06 Mar 2018 13:30    TPro  x      /  Alb  2.1    /  TBili  x      /  DBili  x      /  AST  x      /  ALT  x      /  AlkPhos  x      08 Mar 2018 06:00  TPro  x      /  Alb  2.1    /  TBili  x      /  DBili  x      /  AST  x      /  ALT  x      /  AlkPhos  x      07 Mar 2018 05:36  TPro  x      /  Alb  2.0    /  TBili  x      /  DBili  x      /  AST  x      /  ALT  x      /  AlkPhos  x      06 Mar 2018 13:30          Urine Studies:          RADIOLOGY & ADDITIONAL STUDIES:

## 2018-03-09 NOTE — PROVIDER CONTACT NOTE (OTHER) - SITUATION
Spoke with Erica
Will not be able to see pt today. Patient is on the list.
Spoke to Alireza at office.
while pt in dialysis hr increased to 130 paf lasted very short time dialysis called pt asymptomatic heart rate decreased to 89.

## 2018-03-09 NOTE — PROGRESS NOTE ADULT - SUBJECTIVE AND OBJECTIVE BOX
HPI:  76 y/o M admitted with c/o fall and found here in er to have pneumonia with acute decompensated systolic heart failure, pleural effusion     PMH -Complicated - hx AAA 5.5 cm s/p EVAAR repair with stents 0n 1/29/18 [facility] [place] with incidental finding of Right renal neoplasm, s/p Left Nephrectomy for renal cancer 2008, Bladder Cancer, LBBB, admitted on Feb. 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    #Review of system- rest of review of systems are negative except as mentioned in HPI    PHYSICAL EXAM:    Vital Signs Last 24 Hrs  T(C): 36.6 (09 Mar 2018 10:20), Max: 37.2 (08 Mar 2018 20:34)  T(F): 97.8 (09 Mar 2018 10:20), Max: 99 (08 Mar 2018 20:34)  HR: 91 (09 Mar 2018 10:20) (90 - 104)  BP: 122/76 (09 Mar 2018 10:20) (122/76 - 144/77)  BP(mean): --  RR: 17 (09 Mar 2018 10:20) (16 - 17)  SpO2: 94% (09 Mar 2018 10:20) (91% - 98%)  GENERAL: comfortable   HEAD:  Atraumatic, Normocephalic  EYES: EOMI, PERRLA, conjunctiva and sclera clear  HEENT: Moist mucous membranes  NECK: Supple, No JVD  NERVOUS SYSTEM:  Alert & Oriented X3, Motor Strength 5/5 B/L upper and lower extremities; DTRs 2+ intact and symmetric  CHEST/LUNG: Clear to auscultation bilaterally; No rales, rhonchi, wheezing, or rubs  HEART:S1S2 normal, no murmer  ABDOMEN: Soft, Nontender, Nondistended; Bowel sounds present  GENITOURINARY- Voiding, no palpable bladder  EXTREMITIES:  2+ Peripheral Pulses, No clubbing, cyanosis, or edema  MUSCULOSKELTAL- No muscle tenderness, Muscle tone normal, No joint tenderness, no Joint swelling, Joint range of motion-normal  SKIN-no rash, no lesion  PSYCH- Mood stable  LYMPH Node- No palpable lymph node                          9.1    6.69  )-----------( 283      ( 08 Mar 2018 06:00 )             29.2     03-08    135  |  101  |  47<H>  ----------------------------<  82  4.3   |  24  |  7.08<H>    Ca    8.5      08 Mar 2018 06:00  Phos  3.7     03-08    TPro  x   /  Alb  2.1<L>  /  TBili  x   /  DBili  x   /  AST  x   /  ALT  x   /  AlkPhos  x   03-08    LIVER FUNCTIONS - ( 08 Mar 2018 06:00 )  Alb: 2.1 g/dL / Pro: x     / ALK PHOS: x     / ALT: x     / AST: x     / GGT: x             PT/INR - ( 08 Mar 2018 06:00 )   PT: 35.7 sec;   INR: 3.23 ratio               PT/INR - ( 08 Mar 2018 06:00 )   PT: 35.7 sec;   INR: 3.23 ratio           CAPILLARY BLOOD GLUCOSE          MEDICATIONS  (STANDING):  amiodarone    Tablet 200 milliGRAM(s) Oral daily  amLODIPine   Tablet 2.5 milliGRAM(s) Oral daily  atorvastatin 80 milliGRAM(s) Oral at bedtime  clopidogrel Tablet 75 milliGRAM(s) Oral daily  fluticasone propionate 50 MICROgram(s)/spray Nasal Spray 1 Spray(s) Both Nostrils two times a day  latanoprost 0.005% Ophthalmic Solution 1 Drop(s) Both EYES at bedtime  lidocaine   Patch 1 Patch Transdermal daily  lidocaine   Patch 1 Patch Transdermal daily  melatonin 3 milliGRAM(s) Oral at bedtime  metoprolol     tartrate 25 milliGRAM(s) Oral two times a day  sevelamer hydrochloride 800 milliGRAM(s) Oral three times a day with meals  warfarin 3 milliGRAM(s) Oral daily    MEDICATIONS  (PRN):  acetaminophen   Tablet. 650 milliGRAM(s) Oral every 6 hours PRN Mild Pain (1 - 3)  docusate sodium 100 milliGRAM(s) Oral two times a day PRN Constipation

## 2018-03-09 NOTE — PROVIDER CONTACT NOTE (OTHER) - NAME OF MD/NP/PA/DO NOTIFIED:
ARNEL , SPOKE WITH KARIE
INFECTIOUS DISEASE, DR. MEJIA RECEIVING CONSULT.
PSYCH SPOKE WITH DR. KUMAR. MD AWARE OF CONSULT.
YAEL, SPOKE WITH MD. AWARE OF CONSULT.
Dr. Quevedo
Dr. Quevedo
dr agustin
dr leigh
dr mann
dr mendieta
dr sutherland

## 2018-03-10 LAB
ALBUMIN SERPL ELPH-MCNC: 2.1 G/DL — LOW (ref 3.3–5)
ANION GAP SERPL CALC-SCNC: 10 MMOL/L — SIGNIFICANT CHANGE UP (ref 5–17)
BUN SERPL-MCNC: 43 MG/DL — HIGH (ref 7–23)
CALCIUM SERPL-MCNC: 8.5 MG/DL — SIGNIFICANT CHANGE UP (ref 8.5–10.1)
CHLORIDE SERPL-SCNC: 101 MMOL/L — SIGNIFICANT CHANGE UP (ref 96–108)
CO2 SERPL-SCNC: 27 MMOL/L — SIGNIFICANT CHANGE UP (ref 22–31)
CREAT SERPL-MCNC: 6.98 MG/DL — HIGH (ref 0.5–1.3)
GLUCOSE SERPL-MCNC: 117 MG/DL — HIGH (ref 70–99)
HCT VFR BLD CALC: 26.6 % — LOW (ref 39–50)
HGB BLD-MCNC: 8.4 G/DL — LOW (ref 13–17)
INR BLD: 5.64 RATIO — CRITICAL HIGH (ref 0.88–1.16)
MCHC RBC-ENTMCNC: 28.9 PG — SIGNIFICANT CHANGE UP (ref 27–34)
MCHC RBC-ENTMCNC: 31.6 GM/DL — LOW (ref 32–36)
MCV RBC AUTO: 91.4 FL — SIGNIFICANT CHANGE UP (ref 80–100)
NRBC # BLD: 0 /100 WBCS — SIGNIFICANT CHANGE UP (ref 0–0)
PHOSPHATE SERPL-MCNC: 2.9 MG/DL — SIGNIFICANT CHANGE UP (ref 2.5–4.5)
PLATELET # BLD AUTO: 307 K/UL — SIGNIFICANT CHANGE UP (ref 150–400)
POTASSIUM SERPL-MCNC: 3.7 MMOL/L — SIGNIFICANT CHANGE UP (ref 3.5–5.3)
POTASSIUM SERPL-SCNC: 3.7 MMOL/L — SIGNIFICANT CHANGE UP (ref 3.5–5.3)
PROTHROM AB SERPL-ACNC: 63.1 SEC — HIGH (ref 9.8–12.7)
RBC # BLD: 2.91 M/UL — LOW (ref 4.2–5.8)
RBC # FLD: 14.2 % — SIGNIFICANT CHANGE UP (ref 10.3–14.5)
SODIUM SERPL-SCNC: 138 MMOL/L — SIGNIFICANT CHANGE UP (ref 135–145)
WBC # BLD: 6.89 K/UL — SIGNIFICANT CHANGE UP (ref 3.8–10.5)
WBC # FLD AUTO: 6.89 K/UL — SIGNIFICANT CHANGE UP (ref 3.8–10.5)

## 2018-03-10 RX ORDER — DIAZEPAM 5 MG
5 TABLET ORAL ONCE
Qty: 0 | Refills: 0 | Status: DISCONTINUED | OUTPATIENT
Start: 2018-03-10 | End: 2018-03-10

## 2018-03-10 RX ADMIN — LIDOCAINE 1 PATCH: 4 CREAM TOPICAL at 12:11

## 2018-03-10 RX ADMIN — SEVELAMER CARBONATE 800 MILLIGRAM(S): 2400 POWDER, FOR SUSPENSION ORAL at 17:43

## 2018-03-10 RX ADMIN — Medication 3 MILLIGRAM(S): at 22:57

## 2018-03-10 RX ADMIN — Medication 1 SPRAY(S): at 05:15

## 2018-03-10 RX ADMIN — ATORVASTATIN CALCIUM 80 MILLIGRAM(S): 80 TABLET, FILM COATED ORAL at 22:57

## 2018-03-10 RX ADMIN — Medication 10 MILLIGRAM(S): at 22:57

## 2018-03-10 RX ADMIN — Medication 5 MILLIGRAM(S): at 22:56

## 2018-03-10 RX ADMIN — LIDOCAINE 1 PATCH: 4 CREAM TOPICAL at 01:00

## 2018-03-10 RX ADMIN — LATANOPROST 1 DROP(S): 0.05 SOLUTION/ DROPS OPHTHALMIC; TOPICAL at 22:56

## 2018-03-10 RX ADMIN — Medication 1 SPRAY(S): at 17:26

## 2018-03-10 RX ADMIN — SEVELAMER CARBONATE 800 MILLIGRAM(S): 2400 POWDER, FOR SUSPENSION ORAL at 12:11

## 2018-03-10 RX ADMIN — AMIODARONE HYDROCHLORIDE 200 MILLIGRAM(S): 400 TABLET ORAL at 05:16

## 2018-03-10 RX ADMIN — CLOPIDOGREL BISULFATE 75 MILLIGRAM(S): 75 TABLET, FILM COATED ORAL at 12:11

## 2018-03-10 RX ADMIN — AMLODIPINE BESYLATE 2.5 MILLIGRAM(S): 2.5 TABLET ORAL at 05:16

## 2018-03-10 RX ADMIN — Medication 25 MILLIGRAM(S): at 17:41

## 2018-03-10 RX ADMIN — SEVELAMER CARBONATE 800 MILLIGRAM(S): 2400 POWDER, FOR SUSPENSION ORAL at 07:33

## 2018-03-10 NOTE — PROGRESS NOTE ADULT - ASSESSMENT
A/P    #acute decompensated HFrEF.  - now appears compensated   - Maximal removal of fluids during HD as possible   - HD dependent. Family and patient would now like to pursue 2nd opinion of vascular specialist elsewhere in the city.   - Pt cannot be assigned chair w/o fistula/graft.  - Best options are to be trasferred to hops[facility] that family would prefer to have intervention for renal artery thrombosis and have fistula created   - supportive care     #AF  -on amiodarone   - INR 5.6. no evidence of bleeding. H/H stable (AOCD)  -ct coumadin as per inr- hold today  -CHADS2-3     #Uremic pleuro-pericardial effusion.  -monitor for any worsening clinically particularly in view of his pericardial effusion status.  - echo- trace percardial effusion    #Occluded right renal artery - doppler study noted- As per discussion with Dr. Saul. Pt and family decided to follow up at higher center for further management     #needs AVF formation prior to discharge -vascular surgery evaluation to follow v. transfer?... family will need to obtain accepting hospital/physician and addres both issues. [facility] and family adamant about the surgeon they have selected.     #Above discussed with pt and all questions have been answered to patient and family

## 2018-03-10 NOTE — PROGRESS NOTE ADULT - SUBJECTIVE AND OBJECTIVE BOX
Patient is a 77y Male who reports no complaints overnight. Seen at HD, tolerating thus far    REVIEW OF SYSTEMS:    CONSTITUTIONAL: No weakness, fevers or chills  RESPIRATORY: No cough, wheezing, hemoptysis; No shortness of breath  CARDIOVASCULAR: No chest pain or palpitations  GENITOURINARY: No dysuria, frequency or hematuria  All other review of systems is negative unless indicated above.    MEDICATIONS  (STANDING):  amiodarone    Tablet 200 milliGRAM(s) Oral daily  amLODIPine   Tablet 2.5 milliGRAM(s) Oral daily  atorvastatin 80 milliGRAM(s) Oral at bedtime  bisacodyl Suppository 10 milliGRAM(s) Rectal two times a day  clopidogrel Tablet 75 milliGRAM(s) Oral daily  fluticasone propionate 50 MICROgram(s)/spray Nasal Spray 1 Spray(s) Both Nostrils two times a day  latanoprost 0.005% Ophthalmic Solution 1 Drop(s) Both EYES at bedtime  lidocaine   Patch 1 Patch Transdermal daily  lidocaine   Patch 1 Patch Transdermal daily  melatonin 3 milliGRAM(s) Oral at bedtime  metoprolol     tartrate 25 milliGRAM(s) Oral two times a day  sevelamer hydrochloride 800 milliGRAM(s) Oral three times a day with meals  warfarin 3 milliGRAM(s) Oral daily    MEDICATIONS  (PRN):  acetaminophen   Tablet. 650 milliGRAM(s) Oral every 6 hours PRN Mild Pain (1 - 3)  docusate sodium 100 milliGRAM(s) Oral two times a day PRN Constipation        T(C): , Max: 36.9 (03-09-18 @ 20:46)  T(F): , Max: 98.5 (03-09-18 @ 20:46)  HR: 93 (03-10-18 @ 09:18)  BP: 112/91 (03-10-18 @ 09:18)  BP(mean): --  RR: 17 (03-10-18 @ 09:18)  SpO2: 91% (03-10-18 @ 05:12)  Wt(kg): --        PHYSICAL EXAM:    Constitutional: NAD  HEENT: PERRLA, EOMI,  MMM  Neck: No LAD, No JVD  Respiratory: CTAB  Cardiovascular: S1 and S2, RRR  Gastrointestinal: BS+, soft, NT/ND  Extremities: No peripheral edema  Neurological: A/O x 3, no focal deficits  Psychiatric: Normal mood, normal affect  : No Vallecillo  Skin: No rashes  Access: nasir cath        LABS:                        8.4    6.89  )-----------( 307      ( 10 Mar 2018 07:45 )             26.6     10 Mar 2018 07:45    138    |  101    |  43     ----------------------------<  117    3.7     |  27     |  6.98   08 Mar 2018 06:00    135    |  101    |  47     ----------------------------<  82     4.3     |  24     |  7.08   07 Mar 2018 05:36    137    |  101    |  32     ----------------------------<  89     4.0     |  26     |  5.14   06 Mar 2018 13:30    136    |  100    |  76     ----------------------------<  106    4.5     |  25     |  8.80     Ca    8.5        10 Mar 2018 07:45  Ca    8.5        08 Mar 2018 06:00  Ca    8.5        07 Mar 2018 05:36  Ca    8.5        06 Mar 2018 13:30  Phos  2.9       10 Mar 2018 07:45  Phos  3.7       08 Mar 2018 06:00  Phos  2.7       07 Mar 2018 05:36  Phos  3.6       06 Mar 2018 13:30    TPro  x      /  Alb  2.1    /  TBili  x      /  DBili  x      /  AST  x      /  ALT  x      /  AlkPhos  x      10 Mar 2018 07:45  TPro  x      /  Alb  2.1    /  TBili  x      /  DBili  x      /  AST  x      /  ALT  x      /  AlkPhos  x      08 Mar 2018 06:00  TPro  x      /  Alb  2.1    /  TBili  x      /  DBili  x      /  AST  x      /  ALT  x      /  AlkPhos  x      07 Mar 2018 05:36  TPro  x      /  Alb  2.0    /  TBili  x      /  DBili  x      /  AST  x      /  ALT  x      /  AlkPhos  x      06 Mar 2018 13:30          Urine Studies:          RADIOLOGY & ADDITIONAL STUDIES:

## 2018-03-10 NOTE — PROGRESS NOTE ADULT - ASSESSMENT
77 with recent EVAAR on 1/29/18 [facility], hx of Left Nephrectomy for renal cancer 2008, Bladder Cancer, EVAAR ,re admitted on Feb. 4, 2018 for Acute Systolic CHF requiring HD, Afib on coumadin, HTN, HLD, CAD occlusion of Right renal artery after EVAAR presents from [facility] after fall.     DONNIE after acute renal event post EVAAR w solitary kidney    - remains HD dependent    - daughter luis is in discussions with Dr. Funes regarding WOOD. Direct transfer vs outpatient   - awaiitng HD ouitpt placement, AVF pending vascular    Anemia  -s/p PRBC on 2/27   -await daughter/patients decisions in concert with Dr. Calderon regarding JANNA. I spoke to Luis again on 3/9 regarding this    CM  -UF as tolerated with HD,limited by bp    d/c with GF at length  d/c with staff 77 with recent EVAAR on 1/29/18 [facility], hx of Left Nephrectomy for renal cancer 2008, Bladder Cancer, EVAAR ,re admitted on Feb. 4, 2018 for Acute Systolic CHF requiring HD, Afib on coumadin, HTN, HLD, CAD occlusion of Right renal artery after EVAAR presents from [facility] after fall.     DONNIE after acute renal event post EVAAR w solitary kidney    - remains HD dependent    - daughter luis is in discussions with Dr. Nevarez ([facility]- who has accepted patient) regarding WOOD. Direct transfer. will begin transfer to [facility] after MD to MD sign off and pending bed availability. D/W DTR Angela at length [phone]   - awaitng HD ouitpt placement, AVF pending vascular    Anemia  -s/p PRBC on 2/27   -await daughter/patients decisions in concert with Dr. Calderon regarding JANNA. I spoke to Luis again on 3/9 regarding this    CM  -UF as tolerated with HD,limited by bp     d/c with GF at length  d/c with staff

## 2018-03-10 NOTE — PROGRESS NOTE ADULT - SUBJECTIVE AND OBJECTIVE BOX
HPI:  76 y/o M admitted with c/o fall and found here in er to have pneumonia with acute decompensated systolic heart failure, pleural effusion     PMH -Complicated - hx AAA 5.5 cm s/p EVAR repair with stents 0n 1/29/18 [facility] with incidental finding of Right renal neoplasm, s/p Left Nephrectomy for renal cancer 2008, Bladder Cancer, LBBB, admitted on Feb. 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    #Review of system- rest of review of systems are negative except as mentioned in HPI    PHYSICAL EXAM:      ICU Vital Signs Last 24 Hrs  T(C): 36.8 (10 Mar 2018 13:43), Max: 36.9 (09 Mar 2018 20:46)  T(F): 98.2 (10 Mar 2018 13:43), Max: 98.5 (09 Mar 2018 20:46)  HR: 99 (10 Mar 2018 13:43) (85 - 99)  BP: 123/64 (10 Mar 2018 13:43) (104/91 - 184/69)  BP(mean): --  ABP: --  ABP(mean): --  RR: 18 (10 Mar 2018 13:43) (17 - 18)  SpO2: 92% (10 Mar 2018 13:43) (91% - 95%)        GENERAL: comfortable   HEAD:  Atraumatic, Normocephalic  EYES: EOMI, PERRLA, conjunctiva and sclera clear  HEENT: Moist mucous membranes  NECK: Supple, No JVD  NERVOUS SYSTEM:  Alert & Oriented X3, Motor Strength 5/5 B/L upper and lower extremities; DTRs 2+ intact and symmetric  CHEST/LUNG: Clear to auscultation bilaterally; No rales, rhonchi, wheezing, or rubs; R SCV permacath in place  HEART:S1S2 normal, no murmur  ABDOMEN: Soft, Nontender, Nondistended; Bowel sounds present  GENITOURINARY- Voiding, no palpable bladder  EXTREMITIES:  2+ Peripheral Pulses, No clubbing, cyanosis, or edema  MUSCULOSKELTAL- No muscle tenderness, Muscle tone normal, No joint tenderness, no Joint swelling, Joint range of motion-normal  SKIN-no rash, no lesion  PSYCH- Mood stable  LYMPH Node- No palpable lymph node                          9.1    6.69  )-----------( 283      ( 08 Mar 2018 06:00 )             29.2     03-08    135  |  101  |  47<H>  ----------------------------<  82  4.3   |  24  |  7.08<H>    Ca    8.5      08 Mar 2018 06:00  Phos  3.7     03-08    TPro  x   /  Alb  2.1<L>  /  TBili  x   /  DBili  x   /  AST  x   /  ALT  x   /  AlkPhos  x   03-08    LIVER FUNCTIONS - ( 08 Mar 2018 06:00 )  Alb: 2.1 g/dL / Pro: x     / ALK PHOS: x     / ALT: x     / AST: x     / GGT: x             PT/INR - ( 08 Mar 2018 06:00 )   PT: 35.7 sec;   INR: 3.23 ratio               PT/INR - ( 08 Mar 2018 06:00 )   PT: 35.7 sec;   INR: 3.23 ratio           CAPILLARY BLOOD GLUCOSE          MEDICATIONS  (STANDING):  amiodarone    Tablet 200 milliGRAM(s) Oral daily  amLODIPine   Tablet 2.5 milliGRAM(s) Oral daily  atorvastatin 80 milliGRAM(s) Oral at bedtime  clopidogrel Tablet 75 milliGRAM(s) Oral daily  fluticasone propionate 50 MICROgram(s)/spray Nasal Spray 1 Spray(s) Both Nostrils two times a day  latanoprost 0.005% Ophthalmic Solution 1 Drop(s) Both EYES at bedtime  lidocaine   Patch 1 Patch Transdermal daily  lidocaine   Patch 1 Patch Transdermal daily  melatonin 3 milliGRAM(s) Oral at bedtime  metoprolol     tartrate 25 milliGRAM(s) Oral two times a day  sevelamer hydrochloride 800 milliGRAM(s) Oral three times a day with meals  warfarin 3 milliGRAM(s) Oral daily    MEDICATIONS  (PRN):  acetaminophen   Tablet. 650 milliGRAM(s) Oral every 6 hours PRN Mild Pain (1 - 3)  docusate sodium 100 milliGRAM(s) Oral two times a day PRN Constipation

## 2018-03-11 LAB
ANION GAP SERPL CALC-SCNC: 10 MMOL/L — SIGNIFICANT CHANGE UP (ref 5–17)
BUN SERPL-MCNC: 24 MG/DL — HIGH (ref 7–23)
CALCIUM SERPL-MCNC: 8.5 MG/DL — SIGNIFICANT CHANGE UP (ref 8.5–10.1)
CHLORIDE SERPL-SCNC: 101 MMOL/L — SIGNIFICANT CHANGE UP (ref 96–108)
CO2 SERPL-SCNC: 25 MMOL/L — SIGNIFICANT CHANGE UP (ref 22–31)
CREAT SERPL-MCNC: 4.73 MG/DL — HIGH (ref 0.5–1.3)
GLUCOSE SERPL-MCNC: 91 MG/DL — SIGNIFICANT CHANGE UP (ref 70–99)
HCT VFR BLD CALC: 27.4 % — LOW (ref 39–50)
HGB BLD-MCNC: 8.6 G/DL — LOW (ref 13–17)
INR BLD: 4.84 RATIO — HIGH (ref 0.88–1.16)
MCHC RBC-ENTMCNC: 28.6 PG — SIGNIFICANT CHANGE UP (ref 27–34)
MCHC RBC-ENTMCNC: 31.4 GM/DL — LOW (ref 32–36)
MCV RBC AUTO: 91 FL — SIGNIFICANT CHANGE UP (ref 80–100)
NRBC # BLD: 0 /100 WBCS — SIGNIFICANT CHANGE UP (ref 0–0)
PLATELET # BLD AUTO: 296 K/UL — SIGNIFICANT CHANGE UP (ref 150–400)
POTASSIUM SERPL-MCNC: 3.6 MMOL/L — SIGNIFICANT CHANGE UP (ref 3.5–5.3)
POTASSIUM SERPL-SCNC: 3.6 MMOL/L — SIGNIFICANT CHANGE UP (ref 3.5–5.3)
PROTHROM AB SERPL-ACNC: 54 SEC — HIGH (ref 9.8–12.7)
RBC # BLD: 3.01 M/UL — LOW (ref 4.2–5.8)
RBC # FLD: 14.3 % — SIGNIFICANT CHANGE UP (ref 10.3–14.5)
SODIUM SERPL-SCNC: 136 MMOL/L — SIGNIFICANT CHANGE UP (ref 135–145)
WBC # BLD: 7.08 K/UL — SIGNIFICANT CHANGE UP (ref 3.8–10.5)
WBC # FLD AUTO: 7.08 K/UL — SIGNIFICANT CHANGE UP (ref 3.8–10.5)

## 2018-03-11 RX ORDER — DIAZEPAM 5 MG
5 TABLET ORAL ONCE
Qty: 0 | Refills: 0 | Status: DISCONTINUED | OUTPATIENT
Start: 2018-03-11 | End: 2018-03-11

## 2018-03-11 RX ADMIN — SEVELAMER CARBONATE 800 MILLIGRAM(S): 2400 POWDER, FOR SUSPENSION ORAL at 08:34

## 2018-03-11 RX ADMIN — CLOPIDOGREL BISULFATE 75 MILLIGRAM(S): 75 TABLET, FILM COATED ORAL at 11:47

## 2018-03-11 RX ADMIN — Medication 5 MILLIGRAM(S): at 21:48

## 2018-03-11 RX ADMIN — SEVELAMER CARBONATE 800 MILLIGRAM(S): 2400 POWDER, FOR SUSPENSION ORAL at 17:37

## 2018-03-11 RX ADMIN — ATORVASTATIN CALCIUM 80 MILLIGRAM(S): 80 TABLET, FILM COATED ORAL at 21:18

## 2018-03-11 RX ADMIN — LIDOCAINE 1 PATCH: 4 CREAM TOPICAL at 01:00

## 2018-03-11 RX ADMIN — Medication 3 MILLIGRAM(S): at 21:19

## 2018-03-11 RX ADMIN — AMLODIPINE BESYLATE 2.5 MILLIGRAM(S): 2.5 TABLET ORAL at 05:01

## 2018-03-11 RX ADMIN — Medication 25 MILLIGRAM(S): at 05:07

## 2018-03-11 RX ADMIN — SEVELAMER CARBONATE 800 MILLIGRAM(S): 2400 POWDER, FOR SUSPENSION ORAL at 11:47

## 2018-03-11 RX ADMIN — LATANOPROST 1 DROP(S): 0.05 SOLUTION/ DROPS OPHTHALMIC; TOPICAL at 21:19

## 2018-03-11 RX ADMIN — AMIODARONE HYDROCHLORIDE 200 MILLIGRAM(S): 400 TABLET ORAL at 05:00

## 2018-03-11 RX ADMIN — Medication 1 SPRAY(S): at 05:02

## 2018-03-11 RX ADMIN — Medication 1 SPRAY(S): at 17:37

## 2018-03-11 RX ADMIN — Medication 25 MILLIGRAM(S): at 17:41

## 2018-03-11 NOTE — PROGRESS NOTE ADULT - SUBJECTIVE AND OBJECTIVE BOX
Patient is a 77y Male who reports no complaints overnight. No chest pain or SOB    REVIEW OF SYSTEMS:    CONSTITUTIONAL: stable weakness, fevers or chills  RESPIRATORY: No cough, wheezing, hemoptysis; No shortness of breath  CARDIOVASCULAR: No chest pain or palpitations  GENITOURINARY: No dysuria, frequency or hematuria  All other review of systems is negative unless indicated above.    MEDICATIONS  (STANDING):  amiodarone    Tablet 200 milliGRAM(s) Oral daily  amLODIPine   Tablet 2.5 milliGRAM(s) Oral daily  atorvastatin 80 milliGRAM(s) Oral at bedtime  bisacodyl Suppository 10 milliGRAM(s) Rectal two times a day  clopidogrel Tablet 75 milliGRAM(s) Oral daily  fluticasone propionate 50 MICROgram(s)/spray Nasal Spray 1 Spray(s) Both Nostrils two times a day  latanoprost 0.005% Ophthalmic Solution 1 Drop(s) Both EYES at bedtime  lidocaine   Patch 1 Patch Transdermal daily  lidocaine   Patch 1 Patch Transdermal daily  melatonin 3 milliGRAM(s) Oral at bedtime  metoprolol     tartrate 25 milliGRAM(s) Oral two times a day  sevelamer hydrochloride 800 milliGRAM(s) Oral three times a day with meals    MEDICATIONS  (PRN):  acetaminophen   Tablet. 650 milliGRAM(s) Oral every 6 hours PRN Mild Pain (1 - 3)  docusate sodium 100 milliGRAM(s) Oral two times a day PRN Constipation        T(C): , Max: 36.9 (03-10-18 @ 20:46)  T(F): , Max: 98.4 (03-10-18 @ 20:46)  HR: 92 (03-11-18 @ 04:20)  BP: 152/79 (03-11-18 @ 04:20)  BP(mean): --  RR: 18 (03-11-18 @ 04:20)  SpO2: 93% (03-11-18 @ 04:20)  Wt(kg): --    03-10 @ 06:01  -  03-11 @ 07:00  --------------------------------------------------------  IN: 240 mL / OUT: 0 mL / NET: 240 mL          PHYSICAL EXAM:    Constitutional: NAD, well-groomed, well-developed  HEENT: PERRLA, EOMI,  MMM  Neck: No LAD, No JVD  Respiratory: CTAB  Cardiovascular: S1 and S2, RRR  Gastrointestinal: BS+, soft, NT/ND  Extremities: No peripheral edema  Neurological: A/O x 3, no focal deficits  Psychiatric: Normal mood, normal affect  : No Vallecillo  Skin: No rashes  Access: R tunn cath        LABS:                        8.6    7.08  )-----------( 296      ( 11 Mar 2018 06:18 )             27.4     11 Mar 2018 06:18    136    |  101    |  24     ----------------------------<  91     3.6     |  25     |  4.73   10 Mar 2018 07:45    138    |  101    |  43     ----------------------------<  117    3.7     |  27     |  6.98   08 Mar 2018 06:00    135    |  101    |  47     ----------------------------<  82     4.3     |  24     |  7.08     Ca    8.5        11 Mar 2018 06:18  Ca    8.5        10 Mar 2018 07:45  Ca    8.5        08 Mar 2018 06:00  Phos  2.9       10 Mar 2018 07:45  Phos  3.7       08 Mar 2018 06:00    TPro  x      /  Alb  2.1    /  TBili  x      /  DBili  x      /  AST  x      /  ALT  x      /  AlkPhos  x      10 Mar 2018 07:45  TPro  x      /  Alb  2.1    /  TBili  x      /  DBili  x      /  AST  x      /  ALT  x      /  AlkPhos  x      08 Mar 2018 06:00          Urine Studies:          RADIOLOGY & ADDITIONAL STUDIES:

## 2018-03-11 NOTE — PROGRESS NOTE ADULT - ASSESSMENT
77 with recent EVAAR on 1/29/18 [facility], hx of Left Nephrectomy for renal cancer 2008, Bladder Cancer, EVAAR ,re admitted on Feb. 4, 2018 for Acute Systolic CHF requiring HD, Afib on coumadin, HTN, HLD, CAD occlusion of Right renal artery after EVAAR presents from [facility] after fall.     DONNIE after acute renal event post EVAAR w solitary kidney    - remains HD dependent    - Transfer pending for possible WOOD intervention   - awaitng HD ouitpt placementt    Anemia  -s/p PRBC on 2/27   -await daughter/patients decisions in concert with Dr. Calderon regarding JANNA.    CM  -UF as tolerated with HD,limited by bp     d/c with Dr. Pak

## 2018-03-11 NOTE — PROGRESS NOTE ADULT - SUBJECTIVE AND OBJECTIVE BOX
HPI:  78 y/o M admitted with c/o fall and found here in er to have pneumonia with acute decompensated systolic heart failure, pleural effusion     PMH -Complicated - hx AAA 5.5 cm s/p EVAR repair with stents 0n 1/29/18 [facility] with incidental finding of Right renal neoplasm, s/p Left Nephrectomy for renal cancer 2008, Bladder Cancer, LBBB, admitted on Feb. 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 Discussed case with both DTRs at length. would like to move forward with transfer to [facility] with Dr Cano. Awaiting call back to begin transfer. Pt agrees with plan.     #Review of system- rest of review of systems are negative except as mentioned in HPI    PHYSICAL EXAM:      ICU Vital Signs Last 24 Hrs  T(C): 37 (11 Mar 2018 12:41), Max: 37 (11 Mar 2018 12:41)  T(F): 98.6 (11 Mar 2018 12:41), Max: 98.6 (11 Mar 2018 12:41)  HR: 88 (11 Mar 2018 12:41) (88 - 104)  BP: 153/79 (11 Mar 2018 12:41) (142/77 - 157/80)  BP(mean): --  ABP: --  ABP(mean): --  RR: 17 (11 Mar 2018 12:41) (17 - 18)  SpO2: 94% (11 Mar 2018 12:41) (93% - 95%)          GENERAL: comfortable   HEAD:  Atraumatic, Normocephalic  EYES: EOMI, PERRLA, conjunctiva and sclera clear  HEENT: Moist mucous membranes  NECK: Supple, No JVD  NERVOUS SYSTEM:  Alert & Oriented X3, Motor Strength 5/5 B/L upper and lower extremities; DTRs 2+ intact and symmetric  CHEST/LUNG: Clear to auscultation bilaterally; No rales, rhonchi, wheezing, or rubs; R SCV permacath in place  HEART:S1S2 normal, no murmur  ABDOMEN: Soft, Nontender, Nondistended; Bowel sounds present  GENITOURINARY- Voiding, no palpable bladder  EXTREMITIES:  2+ Peripheral Pulses, No clubbing, cyanosis, or edema  MUSCULOSKELTAL- No muscle tenderness, Muscle tone normal, No joint tenderness, no Joint swelling, Joint range of motion-normal  SKIN-no rash, no lesion  PSYCH- Mood stable  LYMPH Node- No palpable lymph node                          9.1    6.69  )-----------( 283      ( 08 Mar 2018 06:00 )             29.2     03-08    135  |  101  |  47<H>  ----------------------------<  82  4.3   |  24  |  7.08<H>    Ca    8.5      08 Mar 2018 06:00  Phos  3.7     03-08    TPro  x   /  Alb  2.1<L>  /  TBili  x   /  DBili  x   /  AST  x   /  ALT  x   /  AlkPhos  x   03-08    LIVER FUNCTIONS - ( 08 Mar 2018 06:00 )  Alb: 2.1 g/dL / Pro: x     / ALK PHOS: x     / ALT: x     / AST: x     / GGT: x             PT/INR - ( 08 Mar 2018 06:00 )   PT: 35.7 sec;   INR: 3.23 ratio               PT/INR - ( 08 Mar 2018 06:00 )   PT: 35.7 sec;   INR: 3.23 ratio           CAPILLARY BLOOD GLUCOSE          MEDICATIONS  (STANDING):  amiodarone    Tablet 200 milliGRAM(s) Oral daily  amLODIPine   Tablet 2.5 milliGRAM(s) Oral daily  atorvastatin 80 milliGRAM(s) Oral at bedtime  clopidogrel Tablet 75 milliGRAM(s) Oral daily  fluticasone propionate 50 MICROgram(s)/spray Nasal Spray 1 Spray(s) Both Nostrils two times a day  latanoprost 0.005% Ophthalmic Solution 1 Drop(s) Both EYES at bedtime  lidocaine   Patch 1 Patch Transdermal daily  lidocaine   Patch 1 Patch Transdermal daily  melatonin 3 milliGRAM(s) Oral at bedtime  metoprolol     tartrate 25 milliGRAM(s) Oral two times a day  sevelamer hydrochloride 800 milliGRAM(s) Oral three times a day with meals  warfarin 3 milliGRAM(s) Oral daily    MEDICATIONS  (PRN):  acetaminophen   Tablet. 650 milliGRAM(s) Oral every 6 hours PRN Mild Pain (1 - 3)  docusate sodium 100 milliGRAM(s) Oral two times a day PRN Constipation

## 2018-03-11 NOTE — PROGRESS NOTE ADULT - ASSESSMENT
A/P    #acute decompensated HFrEF.  - now appears compensated   - Maximal removal of fluids during HD as possible   - HD dependent. Family and patient would now like to pursue transfer to [facility]  - Pt cannot be assigned chair w/o fistula/graft.  - Best options are to be trasferred to [facility] that family would prefer to have intervention for renal artery thrombosis and have fistula created   - supportive care     #AF  -on amiodarone   - INR 4.3. no evidence of bleeding. H/H stable (AOCD)  -ct coumadin as per inr- hold today  -CHADS2-3     #Uremic pleuro-pericardial effusion.  -monitor for any worsening clinically particularly in view of his pericardial effusion status.  - echo- trace percardial effusion    #Occluded right renal artery - doppler study noted- As per discussion with Dr. Saul. Pt and family decided to follow up at higher center for further management     #needs AVF formation prior to discharge -Plan as above.  Offerred [facility] and family adamant about the surgeon they have selected.     #Above discussed with pt and all questions have been answered to patient and family

## 2018-03-12 ENCOUNTER — TRANSCRIPTION ENCOUNTER (OUTPATIENT)
Age: 78
End: 2018-03-12

## 2018-03-12 VITALS
HEART RATE: 97 BPM | DIASTOLIC BLOOD PRESSURE: 82 MMHG | OXYGEN SATURATION: 96 % | SYSTOLIC BLOOD PRESSURE: 146 MMHG | RESPIRATION RATE: 18 BRPM

## 2018-03-12 LAB
ANION GAP SERPL CALC-SCNC: 9 MMOL/L — SIGNIFICANT CHANGE UP (ref 5–17)
BUN SERPL-MCNC: 40 MG/DL — HIGH (ref 7–23)
CALCIUM SERPL-MCNC: 8.7 MG/DL — SIGNIFICANT CHANGE UP (ref 8.5–10.1)
CHLORIDE SERPL-SCNC: 99 MMOL/L — SIGNIFICANT CHANGE UP (ref 96–108)
CO2 SERPL-SCNC: 27 MMOL/L — SIGNIFICANT CHANGE UP (ref 22–31)
CREAT SERPL-MCNC: 6.93 MG/DL — HIGH (ref 0.5–1.3)
GLUCOSE SERPL-MCNC: 90 MG/DL — SIGNIFICANT CHANGE UP (ref 70–99)
HCT VFR BLD CALC: 28.7 % — LOW (ref 39–50)
HGB BLD-MCNC: 9.1 G/DL — LOW (ref 13–17)
INR BLD: 4.33 RATIO — HIGH (ref 0.88–1.16)
MCHC RBC-ENTMCNC: 28.7 PG — SIGNIFICANT CHANGE UP (ref 27–34)
MCHC RBC-ENTMCNC: 31.7 GM/DL — LOW (ref 32–36)
MCV RBC AUTO: 90.5 FL — SIGNIFICANT CHANGE UP (ref 80–100)
NRBC # BLD: 0 /100 WBCS — SIGNIFICANT CHANGE UP (ref 0–0)
PLATELET # BLD AUTO: 342 K/UL — SIGNIFICANT CHANGE UP (ref 150–400)
POTASSIUM SERPL-MCNC: 4.1 MMOL/L — SIGNIFICANT CHANGE UP (ref 3.5–5.3)
POTASSIUM SERPL-SCNC: 4.1 MMOL/L — SIGNIFICANT CHANGE UP (ref 3.5–5.3)
PROTHROM AB SERPL-ACNC: 48.2 SEC — HIGH (ref 9.8–12.7)
RBC # BLD: 3.17 M/UL — LOW (ref 4.2–5.8)
RBC # FLD: 14.2 % — SIGNIFICANT CHANGE UP (ref 10.3–14.5)
SODIUM SERPL-SCNC: 135 MMOL/L — SIGNIFICANT CHANGE UP (ref 135–145)
WBC # BLD: 9.14 K/UL — SIGNIFICANT CHANGE UP (ref 3.8–10.5)
WBC # FLD AUTO: 9.14 K/UL — SIGNIFICANT CHANGE UP (ref 3.8–10.5)

## 2018-03-12 RX ORDER — MULTIVIT WITH MIN/MFOLATE/K2 340-15/3 G
300 POWDER (GRAM) ORAL ONCE
Qty: 0 | Refills: 0 | Status: COMPLETED | OUTPATIENT
Start: 2018-03-12 | End: 2018-03-12

## 2018-03-12 RX ORDER — MINERAL OIL
133 OIL (ML) MISCELLANEOUS ONCE
Qty: 0 | Refills: 0 | Status: COMPLETED | OUTPATIENT
Start: 2018-03-12 | End: 2018-03-12

## 2018-03-12 RX ADMIN — Medication 25 MILLIGRAM(S): at 19:01

## 2018-03-12 RX ADMIN — Medication 100 MILLIGRAM(S): at 09:40

## 2018-03-12 RX ADMIN — AMIODARONE HYDROCHLORIDE 200 MILLIGRAM(S): 400 TABLET ORAL at 05:05

## 2018-03-12 RX ADMIN — Medication 300 MILLILITER(S): at 13:05

## 2018-03-12 RX ADMIN — AMLODIPINE BESYLATE 2.5 MILLIGRAM(S): 2.5 TABLET ORAL at 05:05

## 2018-03-12 RX ADMIN — Medication 1 SPRAY(S): at 05:06

## 2018-03-12 RX ADMIN — Medication 1 MILLIGRAM(S): at 04:09

## 2018-03-12 RX ADMIN — Medication 133 MILLILITER(S): at 10:33

## 2018-03-12 RX ADMIN — Medication 25 MILLIGRAM(S): at 05:05

## 2018-03-12 NOTE — PROGRESS NOTE ADULT - ASSESSMENT
A/P    #acute decompensated HFrEF.  - now appears compensated   - Maximal removal of fluids during HD as possible   - HD dependent. Family and patient would now like to pursue transfer to [facility]. DTR is communications with Dr Funes and reviewing CTA. Awaiting call back from doctor to begin transfer if patient is accepted for definitive procedure fro WOOD and fistula creation  - Pt cannot be assigned chair w/o fistula/graft.  - supportive care     #AF  -on amiodarone   - INR 4.3. no evidence of bleeding. H/H stable (AOCD)  -ct coumadin as per inr- hold today  -CHADS2-3     #Uremic pleuro-pericardial effusion.  -monitor for any worsening clinically particularly in view of his pericardial effusion status.  - echo- trace percardial effusion    #Occluded right renal artery - doppler study noted- As per discussion with Dr. Saul. Pt and family decided to follow up at higher center for further management     #needs AVF formation prior to discharge -Plan as above.  [facility] and family adamant about the surgeon they have selected.     #Above discussed with pt and all questions have been answered to patient and family

## 2018-03-12 NOTE — PROGRESS NOTE ADULT - SUBJECTIVE AND OBJECTIVE BOX
HPI:  78 y/o M admitted with c/o fall and found here in er to have pneumonia with acute decompensated systolic heart failure, pleural effusion     PMH -Complicated - hx AAA 5.5 cm s/p EVAR repair with stents 0n 1/29/18 [facility] with incidental finding of Right renal neoplasm, s/p Left Nephrectomy for renal cancer 2008, Bladder Cancer, LBBB, admitted on Feb. 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 Discussed case with both DTRs at length. would like to move forward with transfer to [facility] with Dr Funes. Awaiting call back to begin transfer. Pt agrees with plan.   3/12: awaiting call back from Dr Funes to arrange for transfer. No acute issues during the O/N.    #Review of system- rest of review of systems are negative except as mentioned in HPI    PHYSICAL EXAM:    ICU Vital Signs Last 24 Hrs  T(C): 36.5 (12 Mar 2018 05:04), Max: 37.1 (11 Mar 2018 20:57)  T(F): 97.7 (12 Mar 2018 05:04), Max: 98.7 (11 Mar 2018 20:57)  HR: 100 (12 Mar 2018 05:04) (92 - 100)  BP: 162/86 (12 Mar 2018 05:04) (144/82 - 162/86)  BP(mean): --  ABP: --  ABP(mean): --  RR: 17 (12 Mar 2018 05:04) (17 - 17)  SpO2: 95% (12 Mar 2018 05:04) (94% - 95%)            GENERAL: comfortable   HEAD:  Atraumatic, Normocephalic  EYES: EOMI, PERRLA, conjunctiva and sclera clear  HEENT: Moist mucous membranes  NECK: Supple, No JVD  NERVOUS SYSTEM:  Alert & Oriented X3, Motor Strength 5/5 B/L upper and lower extremities; DTRs 2+ intact and symmetric  CHEST/LUNG: Clear to auscultation bilaterally; No rales, rhonchi, wheezing, or rubs; R SCV permacath in place  HEART:S1S2 normal, no murmur  ABDOMEN: Soft, Nontender, Nondistended; Bowel sounds present  GENITOURINARY- Voiding, no palpable bladder  EXTREMITIES:  2+ Peripheral Pulses, No clubbing, cyanosis, or edema  MUSCULOSKELTAL- No muscle tenderness, Muscle tone normal, No joint tenderness, no Joint swelling, Joint range of motion-normal  SKIN-no rash, no lesion  PSYCH- Mood stable  LYMPH Node- No palpable lymph node                          9.1    6.69  )-----------( 283      ( 08 Mar 2018 06:00 )             29.2     03-08    135  |  101  |  47<H>  ----------------------------<  82  4.3   |  24  |  7.08<H>    Ca    8.5      08 Mar 2018 06:00  Phos  3.7     03-08    TPro  x   /  Alb  2.1<L>  /  TBili  x   /  DBili  x   /  AST  x   /  ALT  x   /  AlkPhos  x   03-08    LIVER FUNCTIONS - ( 08 Mar 2018 06:00 )  Alb: 2.1 g/dL / Pro: x     / ALK PHOS: x     / ALT: x     / AST: x     / GGT: x             PT/INR - ( 08 Mar 2018 06:00 )   PT: 35.7 sec;   INR: 3.23 ratio               PT/INR - ( 08 Mar 2018 06:00 )   PT: 35.7 sec;   INR: 3.23 ratio           CAPILLARY BLOOD GLUCOSE          MEDICATIONS  (STANDING):  amiodarone    Tablet 200 milliGRAM(s) Oral daily  amLODIPine   Tablet 2.5 milliGRAM(s) Oral daily  atorvastatin 80 milliGRAM(s) Oral at bedtime  clopidogrel Tablet 75 milliGRAM(s) Oral daily  fluticasone propionate 50 MICROgram(s)/spray Nasal Spray 1 Spray(s) Both Nostrils two times a day  latanoprost 0.005% Ophthalmic Solution 1 Drop(s) Both EYES at bedtime  lidocaine   Patch 1 Patch Transdermal daily  lidocaine   Patch 1 Patch Transdermal daily  melatonin 3 milliGRAM(s) Oral at bedtime  metoprolol     tartrate 25 milliGRAM(s) Oral two times a day  sevelamer hydrochloride 800 milliGRAM(s) Oral three times a day with meals  warfarin 3 milliGRAM(s) Oral daily    MEDICATIONS  (PRN):  acetaminophen   Tablet. 650 milliGRAM(s) Oral every 6 hours PRN Mild Pain (1 - 3)  docusate sodium 100 milliGRAM(s) Oral two times a day PRN Constipation

## 2018-03-12 NOTE — DISCHARGE NOTE ADULT - MEDICATION SUMMARY - MEDICATIONS TO CHANGE
I will SWITCH the dose or number of times a day I take the medications listed below when I get home from the hospital:    lisinopril 5 mg oral tablet  -- 1 tab(s) by mouth once a day    Toprol- mg oral tablet, extended release  -- 1 tab(s) by mouth once a day    warfarin 2 mg oral tablet  -- 1 tab(s) by mouth once a day (at bedtime)

## 2018-03-12 NOTE — DISCHARGE NOTE ADULT - MEDICATION SUMMARY - MEDICATIONS TO TAKE
I will START or STAY ON the medications listed below when I get home from the hospital:    amiodarone 200 mg oral tablet  -- 1 tab(s) by mouth once a day  -- Indication: For Afib    Lipitor 80 mg oral tablet  -- 1 tab(s) by mouth once a day  -- Indication: For Hld    Plavix 75 mg oral tablet  -- 1 tab(s) by mouth once a day  -- Indication: For CAD (coronary artery disease)    Lopressor 50 mg oral tablet  -- 0.5 tab(s) by mouth 2 times a day  -- Indication: For Afib    Norvasc 2.5 mg oral tablet  -- 1 tab(s) by mouth once a day  -- Indication: For Htn    Lidoderm 5% topical film  -- Apply on skin to affected area once a day to left thigh  -- Indication: For PAin    Senna 8.6 mg oral tablet  -- 1 tab(s) by mouth once a day (at bedtime)  -- Indication: For Supplement    Colace 100 mg oral capsule  -- 1 cap(s) by mouth 2 times a day  -- Indication: For Supllement    Melatonin 3 mg oral tablet  -- 1 tab(s) by mouth once (at bedtime)  -- Indication: For Supplement    Xalatan 0.005% ophthalmic solution  -- 1 drop(s) to each affected eye once a day (in the evening)  -- Indication: For Eye drops    sevelamer hydrochloride 800 mg oral tablet  -- 1 tab(s) by mouth 3 times a day  -- Indication: For ESRD on hemodialysis

## 2018-03-12 NOTE — DISCHARGE NOTE ADULT - CARE PROVIDER_API CALL
Ellis Stevens), Internal Medicine; Nephrology  [address] [place] [address]  Phone: [phone]  Fax: [phone]    Asael Dang), Internal Medicine  [address] [place] [address]  Phone: [phone]  Fax: [phone]

## 2018-03-12 NOTE — DISCHARGE NOTE ADULT - CARE PROVIDERS DIRECT ADDRESSES
[email or web address]

## 2018-03-12 NOTE — DISCHARGE NOTE ADULT - PAIN PRESENT
Medication refill addressed by pt's new PCP Dr. Raji Marmolejo     No additional actions required. No

## 2018-03-12 NOTE — DISCHARGE NOTE ADULT - HOSPITAL COURSE
78 y/o M admitted with c/o fall and found here in er to have pneumonia with acute decompensated systolic heart failure, pleural effusion     PMH -Complicated - hx AAA 5.5 cm s/p EVAR repair with stents 0n 1/29/18 [facility] with incidental finding of Right renal neoplasm, s/p Left Nephrectomy for renal cancer 2008, Bladder Cancer, LBBB, admitted on Feb. 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 Discussed case with both DTRs at length. would like to move forward with transfer to [facility] with Dr Funes. Awaiting call back to begin transfer. Pt agrees with plan.   3/12: awaiting call back from Dr Funes to arrange for transfer. No acute issues during the O/N.#Review of system- rest of review of systems are negative except as mentioned in HPI    PHYSICAL EXAM:    ICU Vital Signs Last 24 Hrs  T(C): 36.5 (12 Mar 2018 05:04), Max: 37.1 (11 Mar 2018 20:57)  T(F): 97.7 (12 Mar 2018 05:04), Max: 98.7 (11 Mar 2018 20:57)  HR: 100 (12 Mar 2018 05:04) (92 - 100)  BP: 162/86 (12 Mar 2018 05:04) (144/82 - 162/86)  RR: 17 (12 Mar 2018 05:04) (17 - 17)  SpO2: 95% (12 Mar 2018 05:04) (94% - 95%)    GENERAL: comfortable   HEAD:  Atraumatic, Normocephalic  EYES: EOMI, PERRLA, conjunctiva and sclera clear  HEENT: Moist mucous membranes  NECK: Supple, No JVD  NERVOUS SYSTEM:  Alert & Oriented X3, Motor Strength 5/5 B/L upper and lower extremities; DTRs 2+ intact and symmetricCHEST/LUNG: Clear to auscultation bilaterally; No rales, rhonchi, wheezing, or rubs; R SCV permacath in place  HEART:S1S2 normal, no murmur  ABDOMEN: Soft, Nontender, Nondistended; Bowel sounds present  GENITOURINARY- Voiding, no palpable bladder  EXTREMITIES:  2+ Peripheral Pulses, No clubbing, cyanosis, or edema  MUSCULOSKELTAL- No muscle tenderness, Muscle tone normal, No joint tenderness, no Joint swelling, Joint range of motion-normal  SKIN-no rash, no lesion  PSYCH- Mood stable  LYMPH Node- No palpable lymph node      9.1    6.69  )-----------( 283      ( 08 Mar 2018 06:00 )             29.2     03-08    135  |  101  |  47<H>  ----------------------------<  82  4.3   |  24  |  7.08<H>    Ca    8.5      08 Mar 2018 06:00  Phos  3.7     03-08    TPro  x   /  Alb  2.1<L>  /  TBili  x   /  DBili  x   /  AST  x   /  ALT  x   /  AlkPhos  x   03-08      Labs/Radiology/Meds: reviewed. Will provide accepting MD with disc of all imaging data    · Assessment		  A/P    #acute decompensated HFrEF.  - now appears compensated   - Maximal removal of fluids during HD as possible   - HD dependent. Family and patient would now like to pursue transfer to [facility]. DTR is communications with Dr Funes and reviewing CTA. Awaiting call back from doctor to begin transfer if patient is accepted for definitive procedure fro WOOD and fistula creation  - Pt cannot be assigned chair w/o fistula/graft.  - supportive care     #AF  -on amiodarone   - INR 4.3. no evidence of bleeding. H/H stable (AOCD)  -ct coumadin as per inr- hold today. Once less <2 start heaprin gtt if planning for surgery  -CHADS2-3     #Uremic pleuro-pericardial effusion.  -monitor for any worsening clinically particularly in view of his pericardial effusion status.  - echo- trace percardial effusion    #Occluded right renal artery - doppler study noted- As per discussion with Dr. Saul. Pt and family decided to follow up at [facility] center for further management     #needs AVF formation prior to discharge -Plan as above.   DC time 60 min. D/W Dr Funes office

## 2018-03-12 NOTE — DISCHARGE NOTE ADULT - PATIENT PORTAL LINK FT
You can access the [facility] Patient Portal, offered by [facility], by registering with the following website: [email or web address]

## 2018-03-12 NOTE — DISCHARGE NOTE ADULT - PLAN OF CARE
Acute on chronic systolic CHF now commpensated fluid restrict to 1L daily and cont meds per med rec s/p EVAR with possible WOOD-> transfer to [facility] under care of Dr Funes for definitive tx and fistula creation

## 2018-03-12 NOTE — PROGRESS NOTE ADULT - PROVIDER SPECIALTY LIST ADULT
Cardiology
Heart Failure
Hospitalist
Infectious Disease
Nephrology
Pulmonology
Cardiology
Hospitalist
Pulmonology
Cardiology
Hospitalist
Hospitalist
Pulmonology

## 2018-03-12 NOTE — PROGRESS NOTE ADULT - NSHPATTENDINGPLANDISCUSS_GEN_ALL_CORE
Dr. Lyles
family, patient, nursing and all consultants
patient, family Dr henson

## 2018-03-14 DIAGNOSIS — Z79.01 LONG TERM (CURRENT) USE OF ANTICOAGULANTS: ICD-10-CM

## 2018-03-14 DIAGNOSIS — N28.0 ISCHEMIA AND INFARCTION OF KIDNEY: ICD-10-CM

## 2018-03-14 DIAGNOSIS — I48.0 PAROXYSMAL ATRIAL FIBRILLATION: ICD-10-CM

## 2018-03-14 DIAGNOSIS — J18.9 PNEUMONIA, UNSPECIFIED ORGANISM: ICD-10-CM

## 2018-03-14 DIAGNOSIS — D63.1 ANEMIA IN CHRONIC KIDNEY DISEASE: ICD-10-CM

## 2018-03-14 DIAGNOSIS — Z87.891 PERSONAL HISTORY OF NICOTINE DEPENDENCE: ICD-10-CM

## 2018-03-14 DIAGNOSIS — Z85.51 PERSONAL HISTORY OF MALIGNANT NEOPLASM OF BLADDER: ICD-10-CM

## 2018-03-14 DIAGNOSIS — I13.2 HYPERTENSIVE HEART AND CHRONIC KIDNEY DISEASE WITH HEART FAILURE AND WITH STAGE 5 CHRONIC KIDNEY DISEASE, OR END STAGE RENAL DISEASE: ICD-10-CM

## 2018-03-14 DIAGNOSIS — E78.5 HYPERLIPIDEMIA, UNSPECIFIED: ICD-10-CM

## 2018-03-14 DIAGNOSIS — Z90.5 ACQUIRED ABSENCE OF KIDNEY: ICD-10-CM

## 2018-03-14 DIAGNOSIS — I31.3 PERICARDIAL EFFUSION (NONINFLAMMATORY): ICD-10-CM

## 2018-03-14 DIAGNOSIS — I25.5 ISCHEMIC CARDIOMYOPATHY: ICD-10-CM

## 2018-03-14 DIAGNOSIS — I25.10 ATHEROSCLEROTIC HEART DISEASE OF NATIVE CORONARY ARTERY WITHOUT ANGINA PECTORIS: ICD-10-CM

## 2018-03-14 DIAGNOSIS — N18.6 END STAGE RENAL DISEASE: ICD-10-CM

## 2018-03-14 DIAGNOSIS — I50.43 ACUTE ON CHRONIC COMBINED SYSTOLIC (CONGESTIVE) AND DIASTOLIC (CONGESTIVE) HEART FAILURE: ICD-10-CM

## 2018-03-14 DIAGNOSIS — Z99.2 DEPENDENCE ON RENAL DIALYSIS: ICD-10-CM

## 2018-04-03 ENCOUNTER — INPATIENT (INPATIENT)
Facility: HOSPITAL | Age: 78
LOS: 1 days | Discharge: HOME | End: 2018-04-05
Attending: HOSPITALIST | Admitting: HOSPITALIST
Payer: MEDICARE

## 2018-04-03 VITALS — WEIGHT: 156.97 LBS

## 2018-04-03 DIAGNOSIS — Z98.89 OTHER SPECIFIED POSTPROCEDURAL STATES: Chronic | ICD-10-CM

## 2018-04-03 DIAGNOSIS — Z95.5 PRESENCE OF CORONARY ANGIOPLASTY IMPLANT AND GRAFT: Chronic | ICD-10-CM

## 2018-04-03 DIAGNOSIS — Z98.890 OTHER SPECIFIED POSTPROCEDURAL STATES: Chronic | ICD-10-CM

## 2018-04-03 LAB
ALBUMIN SERPL ELPH-MCNC: 3 G/DL — LOW (ref 3.3–5)
ALP SERPL-CCNC: 87 U/L — SIGNIFICANT CHANGE UP (ref 40–120)
ALT FLD-CCNC: 12 U/L — SIGNIFICANT CHANGE UP (ref 12–78)
ANION GAP SERPL CALC-SCNC: 4 MMOL/L — LOW (ref 5–17)
APTT BLD: 42.6 SEC — HIGH (ref 27.5–37.4)
AST SERPL-CCNC: 16 U/L — SIGNIFICANT CHANGE UP (ref 15–37)
BASOPHILS # BLD AUTO: 0.03 K/UL — SIGNIFICANT CHANGE UP (ref 0–0.2)
BASOPHILS NFR BLD AUTO: 0.4 % — SIGNIFICANT CHANGE UP (ref 0–2)
BILIRUB SERPL-MCNC: 0.6 MG/DL — SIGNIFICANT CHANGE UP (ref 0.2–1.2)
BLD GP AB SCN SERPL QL: SIGNIFICANT CHANGE UP
BUN SERPL-MCNC: 21 MG/DL — SIGNIFICANT CHANGE UP (ref 7–23)
CALCIUM SERPL-MCNC: 8.9 MG/DL — SIGNIFICANT CHANGE UP (ref 8.5–10.1)
CHLORIDE SERPL-SCNC: 99 MMOL/L — SIGNIFICANT CHANGE UP (ref 96–108)
CO2 SERPL-SCNC: 33 MMOL/L — HIGH (ref 22–31)
CREAT SERPL-MCNC: 1.98 MG/DL — HIGH (ref 0.5–1.3)
EOSINOPHIL # BLD AUTO: 0.17 K/UL — SIGNIFICANT CHANGE UP (ref 0–0.5)
EOSINOPHIL NFR BLD AUTO: 2.4 % — SIGNIFICANT CHANGE UP (ref 0–6)
GLUCOSE SERPL-MCNC: 92 MG/DL — SIGNIFICANT CHANGE UP (ref 70–99)
HCT VFR BLD CALC: 23.1 % — LOW (ref 39–50)
HGB BLD-MCNC: 7.4 G/DL — LOW (ref 13–17)
IMM GRANULOCYTES NFR BLD AUTO: 0.6 % — SIGNIFICANT CHANGE UP (ref 0–1.5)
INR BLD: 3.25 RATIO — HIGH (ref 0.88–1.16)
LYMPHOCYTES # BLD AUTO: 1.41 K/UL — SIGNIFICANT CHANGE UP (ref 1–3.3)
LYMPHOCYTES # BLD AUTO: 20.3 % — SIGNIFICANT CHANGE UP (ref 13–44)
MCHC RBC-ENTMCNC: 28.2 PG — SIGNIFICANT CHANGE UP (ref 27–34)
MCHC RBC-ENTMCNC: 32 GM/DL — SIGNIFICANT CHANGE UP (ref 32–36)
MCV RBC AUTO: 88.2 FL — SIGNIFICANT CHANGE UP (ref 80–100)
MONOCYTES # BLD AUTO: 0.71 K/UL — SIGNIFICANT CHANGE UP (ref 0–0.9)
MONOCYTES NFR BLD AUTO: 10.2 % — SIGNIFICANT CHANGE UP (ref 2–14)
NEUTROPHILS # BLD AUTO: 4.58 K/UL — SIGNIFICANT CHANGE UP (ref 1.8–7.4)
NEUTROPHILS NFR BLD AUTO: 66.1 % — SIGNIFICANT CHANGE UP (ref 43–77)
NRBC # BLD: 0 /100 WBCS — SIGNIFICANT CHANGE UP (ref 0–0)
PLATELET # BLD AUTO: 193 K/UL — SIGNIFICANT CHANGE UP (ref 150–400)
POTASSIUM SERPL-MCNC: 3.7 MMOL/L — SIGNIFICANT CHANGE UP (ref 3.5–5.3)
POTASSIUM SERPL-SCNC: 3.7 MMOL/L — SIGNIFICANT CHANGE UP (ref 3.5–5.3)
PROT SERPL-MCNC: 7.3 GM/DL — SIGNIFICANT CHANGE UP (ref 6–8.3)
PROTHROM AB SERPL-ACNC: 36 SEC — HIGH (ref 9.8–12.7)
RBC # BLD: 2.62 M/UL — LOW (ref 4.2–5.8)
RBC # FLD: 19 % — HIGH (ref 10.3–14.5)
SODIUM SERPL-SCNC: 136 MMOL/L — SIGNIFICANT CHANGE UP (ref 135–145)
TYPE + AB SCN PNL BLD: SIGNIFICANT CHANGE UP
WBC # BLD: 6.94 K/UL — SIGNIFICANT CHANGE UP (ref 3.8–10.5)
WBC # FLD AUTO: 6.94 K/UL — SIGNIFICANT CHANGE UP (ref 3.8–10.5)

## 2018-04-03 RX ORDER — CLOPIDOGREL BISULFATE 75 MG/1
75 TABLET, FILM COATED ORAL DAILY
Qty: 0 | Refills: 0 | Status: DISCONTINUED | OUTPATIENT
Start: 2018-04-03 | End: 2018-04-05

## 2018-04-03 RX ORDER — DOCUSATE SODIUM 100 MG
100 CAPSULE ORAL
Qty: 0 | Refills: 0 | Status: DISCONTINUED | OUTPATIENT
Start: 2018-04-03 | End: 2018-04-05

## 2018-04-03 RX ORDER — METOPROLOL TARTRATE 50 MG
50 TABLET ORAL
Qty: 0 | Refills: 0 | Status: DISCONTINUED | OUTPATIENT
Start: 2018-04-03 | End: 2018-04-05

## 2018-04-03 RX ORDER — ATORVASTATIN CALCIUM 80 MG/1
80 TABLET, FILM COATED ORAL AT BEDTIME
Qty: 0 | Refills: 0 | Status: DISCONTINUED | OUTPATIENT
Start: 2018-04-03 | End: 2018-04-05

## 2018-04-03 RX ORDER — PANTOPRAZOLE SODIUM 20 MG/1
40 TABLET, DELAYED RELEASE ORAL EVERY 12 HOURS
Qty: 0 | Refills: 0 | Status: DISCONTINUED | OUTPATIENT
Start: 2018-04-03 | End: 2018-04-05

## 2018-04-03 RX ORDER — SODIUM CHLORIDE 9 MG/ML
1000 INJECTION INTRAMUSCULAR; INTRAVENOUS; SUBCUTANEOUS ONCE
Qty: 0 | Refills: 0 | Status: COMPLETED | OUTPATIENT
Start: 2018-04-03 | End: 2018-04-03

## 2018-04-03 RX ORDER — PANTOPRAZOLE SODIUM 20 MG/1
8 TABLET, DELAYED RELEASE ORAL
Qty: 80 | Refills: 0 | Status: DISCONTINUED | OUTPATIENT
Start: 2018-04-03 | End: 2018-04-03

## 2018-04-03 RX ORDER — PANTOPRAZOLE SODIUM 20 MG/1
80 TABLET, DELAYED RELEASE ORAL ONCE
Qty: 0 | Refills: 0 | Status: COMPLETED | OUTPATIENT
Start: 2018-04-03 | End: 2018-04-03

## 2018-04-03 RX ORDER — ASPIRIN/CALCIUM CARB/MAGNESIUM 324 MG
81 TABLET ORAL DAILY
Qty: 0 | Refills: 0 | Status: DISCONTINUED | OUTPATIENT
Start: 2018-04-03 | End: 2018-04-05

## 2018-04-03 RX ORDER — SODIUM CHLORIDE 9 MG/ML
3 INJECTION INTRAMUSCULAR; INTRAVENOUS; SUBCUTANEOUS ONCE
Qty: 0 | Refills: 0 | Status: COMPLETED | OUTPATIENT
Start: 2018-04-03 | End: 2018-04-03

## 2018-04-03 RX ADMIN — SODIUM CHLORIDE 2000 MILLILITER(S): 9 INJECTION INTRAMUSCULAR; INTRAVENOUS; SUBCUTANEOUS at 16:25

## 2018-04-03 RX ADMIN — SODIUM CHLORIDE 3 MILLILITER(S): 9 INJECTION INTRAMUSCULAR; INTRAVENOUS; SUBCUTANEOUS at 14:41

## 2018-04-03 RX ADMIN — Medication 50 MILLIGRAM(S): at 23:11

## 2018-04-03 RX ADMIN — PANTOPRAZOLE SODIUM 80 MILLIGRAM(S): 20 TABLET, DELAYED RELEASE ORAL at 17:30

## 2018-04-03 RX ADMIN — ATORVASTATIN CALCIUM 80 MILLIGRAM(S): 80 TABLET, FILM COATED ORAL at 23:11

## 2018-04-03 NOTE — H&P ADULT - HISTORY OF PRESENT ILLNESS
This is a 77-year-old male with a past medical history significant of abdominal aortic aneurysm 5.5 cm status post NIALL R repair with stents in January 2018 also found to have right renal neoplasm status post nephrectomy in 2008 consistent with renal cell carcinoma, bladder cancer, who was previously admitted in early February for acute on chronic systolic heart failure with ejection fraction found to be at 25% and renal failure requiring initiation of renal replacement therapy, atrial fibrillation on Coumadin, hypertension, hyperlipidemia, coronary artery disease status post 3 stents (proximal LAD D2 and OM1 90% drug-eluting stent in January 2018), RAJEEV and pneumonia who was sent in by his nephrologist for abnormal labs as his hemoglobin was noted at 6.9.  Upon arrival to the emergency department patient was noted to have a hemoglobin of 7.4.  Patient denies any episodes of overt signs of bleeding.  Patient has never undergone a colonoscopy.  In the emergency department patient underwent guaiac which was positive. This is a 77-year-old male with a past medical history significant of abdominal aortic aneurysm 5.5 cm status post NIALL R repair with stents in January 2018 also found to have right renal neoplasm status post nephrectomy in 2008 consistent with renal cell carcinoma, bladder cancer, who was previously admitted in early February for acute on chronic systolic heart failure with ejection fraction found to be at 25% and renal failure requiring initiation of renal replacement therapy ( transferred to [facility] for Endovascular revision), atrial fibrillation on Coumadin, hypertension, hyperlipidemia, coronary artery disease status post 3 stents (proximal LAD D2 and OM1 90% drug-eluting stent in January 2018), RAJEEV and pneumonia who was sent in by his nephrologist for abnormal labs as his hemoglobin was noted at 6.9.  Upon arrival to the emergency department patient was noted to have a hemoglobin of 7.4.  Patient denies any episodes of overt signs of bleeding.  Patient has never undergone a colonoscopy.  In the emergency department patient underwent guaiac which was positive.

## 2018-04-03 NOTE — CONSULT NOTE ADULT - ASSESSMENT
78 yo with solitary kidney s/p EVAR and renal artery occlusion leading to dialysis now - s.p recent renal artery stenting for salvage.    DONNIE - required acute HD    - appears to be in recovery - no further HD at this time    - would monitor Cr and if remains stable - remove tunelled catheter prior to discharge    - avoid all nephrotoxins - no IV contrast, nsaid;s   - dose meds Cr Cl ~ 30 - 35 cc.min   - avoid ace or arb     Anemia  + Guiac pos  - PRBC - may further in AM - monitor volume status with low EF%  - GI workup  - Abd CT with no IV contrast     HTN   - continue home meds : metoprolol and amlodipine     d.w Dr erendira brown daughter at length    Thank you for the courtesy of this consult. We will follow this patient with you.   Management is subject to change if new information becomes available or patient condition changes.

## 2018-04-03 NOTE — CONSULT NOTE ADULT - SUBJECTIVE AND OBJECTIVE BOX
This is a 77-year-old male with a past medical history significant of right renal neoplasm status post nephrectomy in 2008  consistent with renal cell carcinoma, bladder cancer CAD with 3 stents in Jan 2018 , abdominal aortic aneurysm  5.5 cm status post NIALL R repair in January 2018 and renal failure w occlusion of right renal artery post EVAR at [facility] requiring initiation of renal replacement therapy  Also found to have in February for acute on chronic systolic heart failure with ejection fraction found to be at 25% . Last month pt was transferred to [facility]  to salvage solitary kidney underwent renal artery stenting 2 weeks ago. Pt continued to  require HD until recently Cr has been improving this week.   Now sent in by me after HD labs revealed Hb dropping from 6.9 range and Cr down to 2's.    Upon arrival to the emergency department patient was noted to have a hemoglobin of 7.4.  Patient denies any episodes of overt signs of bleeding.  Patient has never undergone a colonoscopy.  In the emergency department patient underwent guaiac which was positive. Denies abd pains , constipated on and off x months  hx taken from daughter as pt poor historian     pt anxious to go home   very forgetful   PRBC in progress     PAST MEDICAL & SURGICAL HISTORY:  CAD (coronary artery disease)  History of heart artery stent: DIONICIO  Presence of stent in LAD coronary artery  History of nephrectomy: Left  S/P AAA repair  right renal artery stent      MEDICATIONS  (STANDING):  pantoprazole  Injectable 40 milliGRAM(s) IV Push every 12 hours      Allergies    heparin (Other)    Intolerances        SOCIAL HISTORY:  Denies ETOh,Smoking,     FAMILY HISTORY:  Family history of early CAD (Father, Mother)      REVIEW OF SYSTEMS:    CONSTITUTIONAL: No weakness, fevers or chills  EYES/ENT: No visual changes;  No vertigo or throat pain   NECK: No pain or stiffness  RESPIRATORY: No cough, wheezing, hemoptysis; No shortness of breath  CARDIOVASCULAR: No chest pain or palpitations  GASTROINTESTINAL: No abdominal or epigastric pain. No nausea, vomiting, or hematemesis; No diarrhea or constipation. No melena or hematochezia.  GENITOURINARY: No dysuria, frequency or hematuria  NEUROLOGICAL: No numbness or weakness  SKIN: No itching, burning, rashes, or lesions   All other review of systems is negative unless indicated above.    VITAL:  T(C): , Max: 37.1 (04-03-18 @ 21:04)  T(F): , Max: 98.8 (04-03-18 @ 21:04)  HR: 98 (04-03-18 @ 21:04)  BP: 165/87 (04-03-18 @ 21:04)  BP(mean): --  RR: 16 (04-03-18 @ 21:04)  SpO2: 100% (04-03-18 @ 21:04)  Wt(kg): --    I and O's:      Weight (kg): 71.2 (04-03 @ 13:16)    PHYSICAL EXAM:    Constitutional: NAD  HEENT: PERRLA, EOMI,  MMM  Neck: No LAD, No JVD  Respiratory: CTAB  Cardiovascular: S1 and S2  Gastrointestinal: BS+, soft, NT/ND  Extremities: No peripheral edema  Neurological: A/O x 3, no focal deficits  Psychiatric: Normal mood, normal affect  : No Vallecillo  Skin: No rashes  Access: Not applicable    LABS:                                        7.4    6.94  )-----------( 193      ( 03 Apr 2018 14:57 )             23.1         136    |  99     |  21     ----------------------------<  92        03 Apr 2018 14:57  3.7     |  33     |  1.98     Ca    8.9        03 Apr 2018 14:57      Creatinine, Serum: 6.93 mg/dL (03.12.18 @ 06:07)        Urine Studies:          RADIOLOGY & ADDITIONAL STUDIES:

## 2018-04-03 NOTE — H&P ADULT - FAMILY HISTORY
Father  Still living? Unknown  Family history of early CAD, Age at diagnosis: Age Unknown     Mother  Still living? Unknown  Family history of early CAD, Age at diagnosis: Age Unknown

## 2018-04-03 NOTE — H&P ADULT - NSHPPHYSICALEXAM_GEN_ALL_CORE
Vital Signs Last 24 Hrs  T(C): 37.1 (03 Apr 2018 21:15), Max: 37.1 (03 Apr 2018 21:04)  T(F): 98.7 (03 Apr 2018 21:15), Max: 98.8 (03 Apr 2018 21:04)  HR: 94 (03 Apr 2018 21:15) (88 - 98)  BP: 162/82 (03 Apr 2018 21:15) (117/64 - 165/87)  BP(mean): --  RR: 18 (03 Apr 2018 21:15) (16 - 18)  SpO2: 100% (03 Apr 2018 21:15) (100% - 100%)    GENERAL APPEARANCE: Well developed, well nourished, in no acute distress.  SKIN: Inspection of the skin reveals no rashes, ulcerations or petechiae.  HEENT: Conjunctival pallor  NECK: Supple and symmetric.  CHEST: Left anterior chest wall permacath  LUNGS: Auscultation of the lungs revealed normal breath sounds without any other adventitious sounds or rubs.  CARDIOVASCULAR: There was a regular rate and rhythm without any murmurs, gallops, rubs.  ABDOMEN: Soft and nontender with normal bowel sounds.  MUSCULOSKELETAL:.  There was no tenderness or effusions noted. Muscle strength and tone were normal.  EXTREMITIES: Trace distal lower extremity edema  NEUROLOGIC: Alert and oriented x 3. Normal affect.

## 2018-04-03 NOTE — H&P ADULT - ASSESSMENT
This is a 77-year-old gentleman with a history of recently underwent a repair of an abdominal aortic aneurysm as well as 3 drug-eluting stents atrial fibrillation and hit admitted for symptomatic anemia with guaiac positive stools and supratherapeutic suspicious for lower GI bleeding presently hemodynamically stable.    Anemia: Symptomatic anemia likely secondary to chronic GI blood loss and exacerbated by concurrent anticoagulation and antiplatelet therapy  Given the fact that the patient recently has had drug-eluting stents placed within the last 3 months we will continue with dual antiplatelet therapy  With regards to anticoagulation will hold off on Coumadin at this time in the setting of guaiac positive stools  After review of the patient's clinical history he does indeed have a history of atrial fibrillation with a chads score of 3 requiring full dose anticoagulation as well as a recent diagnosis of hit requiring anticoagulation  Would reassess the patient's heparin antibody to see if still present, albeit this would still require the patient to be on full dose anticoagulation in the setting of atrial fibrillation.  May provide some intact insight possible future use of heparin products  Also in the setting of recently having undergone a procedure would evaluate with a CT abdomen and pelvis with no contrast for possibility of free blood within the abdomen  Keep type and screen active patient received 1 unit of packed red blood cells in the emergency room  May Require diuresis in the am yet now presently is satting well and breathing is currently stable  GI consultation for possible endoscopy     Cardiovascular: Coronary artery disease, CHF last known ejection fraction 25%  Would continue with current dual antiplatelet therapy  Continue with beta-blocker  Patient is not on ARB due to renal failure    Renal: Patient is presently just demonstrating some degree of renal recovery renal input is appreciated and will hold off on    DVT ppx: IPC  CODE: FULL

## 2018-04-03 NOTE — ED ADULT NURSE NOTE - OBJECTIVE STATEMENT
Pt sent after phone call from  regarding concern for low H/H. Pt denies fever, SOB, chest pain.  Pt has right permacath access for dialysis M, W,F.

## 2018-04-03 NOTE — ED ADULT TRIAGE NOTE - CHIEF COMPLAINT QUOTE
Pt sent by Dr henson to ED for low hemoglobin and hematocrit. blodwork drawn yesterday during dialyisis. unknown if pt is bleeding, pt denies SOb, dizziness or weakness

## 2018-04-03 NOTE — ED PROVIDER NOTE - OBJECTIVE STATEMENT
76 y/o F with a PMhx of recent CHF, ESRD dx on dialysis, CAD with stents on Coumadin, ASA, Plavix presents to the ED being sent in by Sergio Stevens for low H/H level that was seen on blood work drawn yesterday. Pt states that he is being sent in to receive further lab testing and possible transfusion. Pt family states that he was recently admitted to hospital for about x1 month due to renal and heart failure. Pt states that he feels okay here in ED, currently calm, able to provide adequate hx and denies fever, cough, chills, abd pain, NVD, melena, BRBPR in toilet or any other acute c/o at this time. 76 y/o F with a PMhx of recent CHF, ESRD dx on dialysis, CAD with stents on Coumadin, ASA, Plavix, s/p AAA repair presents to the ED being sent in by Sergio Stevens for low H/H level that was seen on blood work drawn yesterday. Pt states that he is being sent in to receive further lab testing and possible transfusion. Pt family states that he was recently admitted to hospital for about x1 month due to renal and heart failure. Pt states that he feels okay here in ED, currently calm, able to provide adequate hx and denies fever, cough, chills, abd pain, NVD, melena, BRBPR in toilet, hematuria or any other acute c/o at this time.  No cp, sob, lightheadedness, abd pain.  Rodney Dang Basein.

## 2018-04-03 NOTE — H&P ADULT - NSHPLABSRESULTS_GEN_ALL_CORE
.  LABS:                         7.4    6.94  )-----------( 193      ( 03 Apr 2018 14:57 )             23.1     04-03    136  |  99  |  21  ----------------------------<  92  3.7   |  33<H>  |  1.98<H>    Ca    8.9      03 Apr 2018 14:57    TPro  7.3  /  Alb  3.0<L>  /  TBili  0.6  /  DBili  x   /  AST  16  /  ALT  12  /  AlkPhos  87  04-03    PT/INR - ( 03 Apr 2018 14:57 )   PT: 36.0 sec;   INR: 3.25 ratio         PTT - ( 03 Apr 2018 14:57 )  PTT:42.6 sec          RADIOLOGY, EKG & ADDITIONAL TESTS: Reviewed.

## 2018-04-03 NOTE — H&P ADULT - PSH
History of heart artery stent  DIONICIO  History of nephrectomy  Left  Presence of stent in LAD coronary artery    S/P AAA repair

## 2018-04-03 NOTE — PATIENT PROFILE ADULT. - PMH
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

## 2018-04-04 LAB
ALBUMIN SERPL ELPH-MCNC: 2.5 G/DL — LOW (ref 3.3–5)
ANION GAP SERPL CALC-SCNC: 8 MMOL/L — SIGNIFICANT CHANGE UP (ref 5–17)
BASOPHILS # BLD AUTO: 0.03 K/UL — SIGNIFICANT CHANGE UP (ref 0–0.2)
BASOPHILS NFR BLD AUTO: 0.5 % — SIGNIFICANT CHANGE UP (ref 0–2)
BUN SERPL-MCNC: 22 MG/DL — SIGNIFICANT CHANGE UP (ref 7–23)
CALCIUM SERPL-MCNC: 8.3 MG/DL — LOW (ref 8.5–10.1)
CHLORIDE SERPL-SCNC: 105 MMOL/L — SIGNIFICANT CHANGE UP (ref 96–108)
CO2 SERPL-SCNC: 28 MMOL/L — SIGNIFICANT CHANGE UP (ref 22–31)
CREAT SERPL-MCNC: 1.9 MG/DL — HIGH (ref 0.5–1.3)
EOSINOPHIL # BLD AUTO: 0.13 K/UL — SIGNIFICANT CHANGE UP (ref 0–0.5)
EOSINOPHIL NFR BLD AUTO: 2.2 % — SIGNIFICANT CHANGE UP (ref 0–6)
FERRITIN SERPL-MCNC: 948 NG/ML — HIGH (ref 30–400)
GLUCOSE SERPL-MCNC: 88 MG/DL — SIGNIFICANT CHANGE UP (ref 70–99)
HCT VFR BLD CALC: 23.8 % — LOW (ref 39–50)
HCT VFR BLD CALC: 24 % — LOW (ref 39–50)
HGB BLD-MCNC: 7.8 G/DL — LOW (ref 13–17)
HGB BLD-MCNC: 7.9 G/DL — LOW (ref 13–17)
IMM GRANULOCYTES NFR BLD AUTO: 0.5 % — SIGNIFICANT CHANGE UP (ref 0–1.5)
INR BLD: 2.67 RATIO — HIGH (ref 0.88–1.16)
IRON SATN MFR SERPL: 40 % — SIGNIFICANT CHANGE UP (ref 16–55)
IRON SATN MFR SERPL: 80 UG/DL — SIGNIFICANT CHANGE UP (ref 45–165)
LYMPHOCYTES # BLD AUTO: 1.37 K/UL — SIGNIFICANT CHANGE UP (ref 1–3.3)
LYMPHOCYTES # BLD AUTO: 23.5 % — SIGNIFICANT CHANGE UP (ref 13–44)
MCHC RBC-ENTMCNC: 28.2 PG — SIGNIFICANT CHANGE UP (ref 27–34)
MCHC RBC-ENTMCNC: 33.2 GM/DL — SIGNIFICANT CHANGE UP (ref 32–36)
MCV RBC AUTO: 85 FL — SIGNIFICANT CHANGE UP (ref 80–100)
MONOCYTES # BLD AUTO: 0.56 K/UL — SIGNIFICANT CHANGE UP (ref 0–0.9)
MONOCYTES NFR BLD AUTO: 9.6 % — SIGNIFICANT CHANGE UP (ref 2–14)
NEUTROPHILS # BLD AUTO: 3.7 K/UL — SIGNIFICANT CHANGE UP (ref 1.8–7.4)
NEUTROPHILS NFR BLD AUTO: 63.7 % — SIGNIFICANT CHANGE UP (ref 43–77)
NRBC # BLD: 0 /100 WBCS — SIGNIFICANT CHANGE UP (ref 0–0)
PHOSPHATE SERPL-MCNC: 1.4 MG/DL — LOW (ref 2.5–4.5)
PLATELET # BLD AUTO: 198 K/UL — SIGNIFICANT CHANGE UP (ref 150–400)
POTASSIUM SERPL-MCNC: 3.1 MMOL/L — LOW (ref 3.5–5.3)
POTASSIUM SERPL-SCNC: 3.1 MMOL/L — LOW (ref 3.5–5.3)
PROTHROM AB SERPL-ACNC: 29.4 SEC — HIGH (ref 9.8–12.7)
RBC # BLD: 2.8 M/UL — LOW (ref 4.2–5.8)
RBC # BLD: 2.8 M/UL — LOW (ref 4.2–5.8)
RBC # FLD: 18 % — HIGH (ref 10.3–14.5)
RETICS #: 38.4 K/UL — SIGNIFICANT CHANGE UP (ref 25–125)
RETICS/RBC NFR: 1.4 % — SIGNIFICANT CHANGE UP (ref 0.5–2.5)
SODIUM SERPL-SCNC: 141 MMOL/L — SIGNIFICANT CHANGE UP (ref 135–145)
TIBC SERPL-MCNC: 200 UG/DL — LOW (ref 220–430)
UIBC SERPL-MCNC: 120 UG/DL — SIGNIFICANT CHANGE UP (ref 110–370)
WBC # BLD: 5.82 K/UL — SIGNIFICANT CHANGE UP (ref 3.8–10.5)
WBC # FLD AUTO: 5.82 K/UL — SIGNIFICANT CHANGE UP (ref 3.8–10.5)

## 2018-04-04 RX ORDER — LANOLIN ALCOHOL/MO/W.PET/CERES
3 CREAM (GRAM) TOPICAL ONCE
Qty: 0 | Refills: 0 | Status: COMPLETED | OUTPATIENT
Start: 2018-04-04 | End: 2018-04-04

## 2018-04-04 RX ADMIN — PANTOPRAZOLE SODIUM 40 MILLIGRAM(S): 20 TABLET, DELAYED RELEASE ORAL at 17:26

## 2018-04-04 RX ADMIN — Medication 50 MILLIGRAM(S): at 09:49

## 2018-04-04 RX ADMIN — Medication 81 MILLIGRAM(S): at 13:58

## 2018-04-04 RX ADMIN — Medication 3 MILLIGRAM(S): at 00:35

## 2018-04-04 RX ADMIN — Medication 100 MILLIGRAM(S): at 05:09

## 2018-04-04 RX ADMIN — CLOPIDOGREL BISULFATE 75 MILLIGRAM(S): 75 TABLET, FILM COATED ORAL at 13:57

## 2018-04-04 RX ADMIN — ATORVASTATIN CALCIUM 80 MILLIGRAM(S): 80 TABLET, FILM COATED ORAL at 22:14

## 2018-04-04 RX ADMIN — Medication 50 MILLIGRAM(S): at 22:14

## 2018-04-04 RX ADMIN — PANTOPRAZOLE SODIUM 40 MILLIGRAM(S): 20 TABLET, DELAYED RELEASE ORAL at 05:09

## 2018-04-04 NOTE — PROVIDER CONTACT NOTE (OTHER) - SITUATION
Dr. Stevens's office notified about pt.
Spoke with office regarding consult for colonoscopy clearance

## 2018-04-04 NOTE — PROGRESS NOTE ADULT - SUBJECTIVE AND OBJECTIVE BOX
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  Pt had renal artery stenting 2 weeks ago to salvage solitary kidney.  He required HD until this week w/ improving Cr.   He was sent in by his Nephrologist b/c anemia Hg 6.9.    Pt denies having active bleeding but FOBT positive in Ed.  Pt never had colonoscopy.  Very upset he's here and would like to hold off on colonoscopy if possible.  Daughter at bedside answered most questions.   Pt denies cp, sob, palp, abd pain, n/v/d, f/u/d, denies blood in stool.      ros- as per hpi above, otherwise 10 pt ros neg    Vital Signs Last 24 Hrs  T(C): 36.5 (04 Apr 2018 04:13), Max: 37.1 (03 Apr 2018 21:04)  T(F): 97.7 (04 Apr 2018 04:13), Max: 98.8 (03 Apr 2018 21:04)  HR: 101 (04 Apr 2018 09:51) (90 - 101)  BP: 134/77 (04 Apr 2018 09:51) (132/80 - 165/87)  BP(mean): --  RR: 18 (04 Apr 2018 04:13) (16 - 18)  SpO2: 96% (04 Apr 2018 04:13) (94% - 100%)      PHYSICAL EXAM:  General: pleasant male, ambulating in room, tearful at times  Neuro: AAOx3, no focal deficits  HEENT: NCAT, EOMI, MMM  Neck: Soft and supple, No JVD  Respiratory: CTA b/l, no w/r/r  Cardiovascular: S1 and S2, RRR, no m/g/r  - rt chest wall temp. HD cath   Gastrointestinal: +BS, soft, NTND, no rebound/guarding  Extremities: No c/c/e  Vascular: 2+ peripheral pulses  Musculoskeletal: 5/5 strength b/l UE and LE, sensation intact          LABS: All Labs Reviewed:                        7.8    x     )-----------( x        ( 04 Apr 2018 12:42 )             24.0     04-04    141  |  105  |  22  ----------------------------<  88  3.1<L>   |  28  |  1.90<H>    Ca    8.3<L>      04 Apr 2018 05:23  Phos  1.4     04-04    TPro  x   /  Alb  2.5<L>  /  TBili  x   /  DBili  x   /  AST  x   /  ALT  x   /  AlkPhos  x   04-04    PT/INR - ( 04 Apr 2018 10:45 )   PT: 29.4 sec;   INR: 2.67 ratio         PTT - ( 03 Apr 2018 14:57 )  PTT:42.6 sec cc: sent in b/c anemia  hpi: 77y male w/ PMH rt renal neoplasm s/p nephrectomy 2008, RCC, bladder cancer, CAD s/p 3 stents 1/2018, AAA s/p EVAR 1/2018 and DONNIE w/ occlusion of rt renal artery  requiring urgent HD.  Pt had renal artery stenting 2 weeks ago to salvage solitary kidney.  He required HD until this week w/ improving Cr.   He was sent in by his Nephrologist b/c anemia Hg 6.9.    Pt denies having active bleeding but FOBT positive in Ed.  Pt never had colonoscopy.  Very upset he's here and would like to hold off on colonoscopy if possible.  Daughter at bedside answered most questions.   Pt denies cp, sob, palp, abd pain, n/v/d, f/u/d, denies blood in stool.      ros- as per hpi above, otherwise 10 pt ros neg    Vital Signs Last 24 Hrs  T(C): 36.5 (04 Apr 2018 04:13), Max: 37.1 (03 Apr 2018 21:04)  T(F): 97.7 (04 Apr 2018 04:13), Max: 98.8 (03 Apr 2018 21:04)  HR: 101 (04 Apr 2018 09:51) (90 - 101)  BP: 134/77 (04 Apr 2018 09:51) (132/80 - 165/87)  BP(mean): --  RR: 18 (04 Apr 2018 04:13) (16 - 18)  SpO2: 96% (04 Apr 2018 04:13) (94% - 100%)      PHYSICAL EXAM:  General: pleasant male, ambulating in room, tearful at times  Neuro: AAOx3, no focal deficits  HEENT: NCAT, EOMI, MMM  Neck: Soft and supple, No JVD  Respiratory: CTA b/l, no w/r/r  Cardiovascular: S1 and S2, RRR, no m/g/r  - rt chest wall temp. HD cath   Gastrointestinal: +BS, soft, NTND, no rebound/guarding  Extremities: No c/c/e  Vascular: 2+ peripheral pulses  Musculoskeletal: 5/5 strength b/l UE and LE, sensation intact          LABS: All Labs Reviewed:                        7.8    x     )-----------( x        ( 04 Apr 2018 12:42 )             24.0     04-04    141  |  105  |  22  ----------------------------<  88  3.1<L>   |  28  |  1.90<H>    Ca    8.3<L>      04 Apr 2018 05:23  Phos  1.4     04-04    TPro  x   /  Alb  2.5<L>  /  TBili  x   /  DBili  x   /  AST  x   /  ALT  x   /  AlkPhos  x   04-04    PT/INR - ( 04 Apr 2018 10:45 )   PT: 29.4 sec;   INR: 2.67 ratio         PTT - ( 03 Apr 2018 14:57 )  PTT:42.6 sec    < from: CT Abdomen and Pelvis No Cont (04.03.18 @ 23:01) >  IMPRESSION:   Bilateral pleural effusions and bilateral lower lobes infiltrates and   atelectatic changes for which clinical correlation is recommended.  No acute abnormalitiesin the abdomen or pelvis within limits of a   noncontrast study.  Constipation and rectal fecal impaction.    < end of copied text >    MEDICATIONS  (STANDING):  aspirin  chewable 81 milliGRAM(s) Oral daily  atorvastatin 80 milliGRAM(s) Oral at bedtime  clopidogrel Tablet 75 milliGRAM(s) Oral daily  docusate sodium 100 milliGRAM(s) Oral two times a day  metoprolol tartrate 50 milliGRAM(s) Oral two times a day  pantoprazole  Injectable 40 milliGRAM(s) IV Push every 12 hours    MEDICATIONS  (PRN):      Assessment and Plan:   77y male w/     *acute blood loss anemia r/o GIB  - fobt positive in ED   - s/p 1uprbc yesterday and will transfuse 1 unit today--> monitor volume status w/ CHF  - CT abd no acute abnormalities  - GI f/u appreciated- agree to monitor h/h and if pt stable can f/u in office for colonoscopy (which pt does not want at this time)    *DONNIE requiring acute HD (4/2) due to  recent renal artery occlusion requiring stenting  - Nephrology f/u appreciated- discussed w/ Vascular fellow- rec not holding plavix due to high risk for RA stent thrombosis  - and keep HD cath in place until f/u w/ Surgeon next week  - monitor renal function, no contrast or nephrotoxic meds    *CAD s/p 3 stents 1/2018  - continue aspirin and plavix as above  - monitor h/h  - statin, BB  - Cardio consulted     *PAF   - continue BB  - hold coumadin, repeat INR  - Cardio f/u appreciated     *chronic systolic/diastolic CHF  - stable, monitor respiratory status w/ transfusion    *HTN  - continue home meds    *dvt px

## 2018-04-04 NOTE — PROGRESS NOTE ADULT - ASSESSMENT
78 yo with solitary kidney s/p EVAR and renal artery occlusion leading to dialysis now - s.p recent renal artery stenting for salvage.    DONNIE - required acute HD - last 4/2  s/p renal arterty stent in March   - appears to be in recovery - no further HD at this time    - would monitor Cr and if remains stable - remove tunelled catheter prior to discharge - await call back from   Dr Cano at [facility]    - avoid all nephrotoxins - no IV contrast, nsaid;s   - Dose meds Cr Cl ~ 30 - 35 cc.min   - Avoid ace or arb   - d./w vascular fellow - Dr Rico - would NOT hold Plavix at this time as high risk for RA stent thrombosis       Anemia  + Guiac pos  - PRBC - may need further - monitor volume status with low EF%  - GI workup  - Abd CT with no IV contrast - noted fecal impaction     HTN   - continue home meds : metoprolol and amlodipine     d/w Dr betty louw daughter at length    Thank you for the courtesy of this consult. We will follow this patient with you.   Management is subject to change if new information becomes available or patient condition changes. 78 yo with solitary kidney s/p EVAR and renal artery occlusion leading to dialysis now - s.p recent renal artery stenting for salvage.    DONNIE - required acute HD - last 4/2  s/p renal arterty stent in March   - appears to be in recovery - no further HD at this time - discussed with Dr Cano - reccomended to keep catheter in place as he is concerned about stent  stability  - he will see patient in a week at discharge next week and if stable will remove TDC then.   - avoid all nephrotoxins - no IV contrast, nsaid;s   - Dose meds Cr Cl ~ 30 - 35 cc.min   - Avoid ace or arb   - d./w vascular fellow - Dr Rico - would NOT hold Plavix at this time as high risk for RA stent thrombosis       Anemia  + Guiac pos  - PRBC - may need further - monitor volume status with low EF%  - GI workup  - Abd CT with no IV contrast - noted fecal impaction     HTN   - continue home meds : metoprolol and amlodipine     d/w Dr betty louw daughter at length    Thank you for the courtesy of this consult. We will follow this patient with you.   Management is subject to change if new information becomes available or patient condition changes.

## 2018-04-04 NOTE — CONSULT NOTE ADULT - ASSESSMENT
76 y/o male with symptomatic anemia/+ occult stool.    Plan:  Keep NPO for now.  Continue PPIs.  Afib-Off coumadin now due to gi bleed.  S/p x1 unit of PRBCs- trend H/H-stable/ pt asymptomatic at this time   Pt reports never having a colonoscopy in the past.    Will benefit from colonoscopy, however pt on plavix now due to afib, CAD s/p 3 stents in the last 3 months and would need cardiology clearance.  Will discuss further with Dr. Bedolla.     Plan discussed with pt. 78 y/o male with symptomatic anemia/+ occult stool.    Plan:  Continue PPIs.  Afib-Off coumadin now due to gi bleed.  S/p x1 unit of PRBCs- trend H/H-stable/ pt asymptomatic at this time   Pt reports never having a colonoscopy in the past.    EGD/colonoscopy is indicated for pt, however pt on plavix now due to afib, CAD s/p 3 stents in the last 3 months and would need cardiology clearance.    Will defer inpatient endoscopic procedures at this time, pt to follow up in office once d/maryellen from hospital.    Plan discussed with pt and Dr. Bedolla-left message for Dr. Bar to discuss the plan.

## 2018-04-04 NOTE — PROGRESS NOTE ADULT - SUBJECTIVE AND OBJECTIVE BOX
This is a 77-year-old male with a past medical history significant of right renal neoplasm status post nephrectomy in 2008  consistent with renal cell carcinoma, bladder cancer CAD with 3 stents in Jan 2018 , abdominal aortic aneurysm  5.5 cm status post NIALL R repair in January 2018 and renal failure w occlusion of right renal artery post EVAR at [facility] requiring initiation of renal replacement therapy  Also found to have in February for acute on chronic systolic heart failure with ejection fraction found to be at 25% . Last month pt was transferred to [facility]  to salvage solitary kidney underwent renal artery stenting 2 weeks ago. Pt continued to  require HD until recently Cr has been improving this week.   Now sent in by me after HD labs revealed Hb dropping from 6.9 range and Cr down to 2's.    Upon arrival to the emergency department patient was noted to have a hemoglobin of 7.4.  Patient denies any episodes of overt signs of bleeding.  Patient has never undergone a colonoscopy.  In the emergency department patient underwent guaiac which was positive. Denies abd pains , constipated on and off x months  hx taken from daughter as pt poor historian       today   pt anxious   very forgetful   daughter at bedside       PAST MEDICAL & SURGICAL HISTORY:  CAD (coronary artery disease)  History of heart artery stent: DIONICIO  Presence of stent in LAD coronary artery  History of nephrectomy: Left  S/P AAA repair  right renal artery stent    MEDICATIONS  (STANDING):  aspirin  chewable 81 milliGRAM(s) Oral daily  atorvastatin 80 milliGRAM(s) Oral at bedtime  clopidogrel Tablet 75 milliGRAM(s) Oral daily  docusate sodium 100 milliGRAM(s) Oral two times a day  metoprolol tartrate 50 milliGRAM(s) Oral two times a day  pantoprazole  Injectable 40 milliGRAM(s) IV Push every 12 hours      Allergies    heparin (Other)    Intolerances    REVIEW OF SYSTEMS:    CONSTITUTIONAL: No weakness, fevers or chills  EYES/ENT: No visual changes;  No vertigo or throat pain   NECK: No pain or stiffness  RESPIRATORY: No cough, wheezing, hemoptysis; No shortness of breath  CARDIOVASCULAR: No chest pain or palpitations  GASTROINTESTINAL: No abdominal or epigastric pain. No nausea, vomiting, or hematemesis; No diarrhea or constipation. No melena or hematochezia.  GENITOURINARY: No dysuria, frequency or hematuria  NEUROLOGICAL: No numbness or weakness  SKIN: No itching, burning, rashes, or lesions   All other review of systems is negative unless indicated above.    Vital Signs Last 24 Hrs  T(C): 36.5 (04 Apr 2018 04:13), Max: 37.1 (03 Apr 2018 21:04)  T(F): 97.7 (04 Apr 2018 04:13), Max: 98.8 (03 Apr 2018 21:04)  HR: 101 (04 Apr 2018 09:51) (88 - 101)  BP: 134/77 (04 Apr 2018 09:51) (117/64 - 165/87)  BP(mean): --  RR: 18 (04 Apr 2018 04:13) (16 - 18)  SpO2: 96% (04 Apr 2018 04:13) (94% - 100%)  I and O's:      Weight (kg): 71.2 (04-03 @ 13:16)    PHYSICAL EXAM:    Constitutional: NAD  HEENT: PERRLA, EOMI,  MMM  Neck: No LAD, No JVD  Respiratory: CTAB  Cardiovascular: S1 and S2  Gastrointestinal: BS+, soft, NT/ND  Extremities: No peripheral edema  Neurological: A/O x 3, no focal deficits  Psychiatric: Normal mood, normal affect  : No Vallecillo  Skin: No rashes  Access: Not applicable    LABS:                                        7.9    5.82  )-----------( 198      ( 04 Apr 2018 05:23 )             23.8                         7.4    6.94  )-----------( 193      ( 03 Apr 2018 14:57 )             23.1       141    |  105    |  22     ----------------------------<  88        04 Apr 2018 05:23  3.1     |  28     |  1.90     136    |  99     |  21     ----------------------------<  92        03 Apr 2018 14:57  3.7     |  33     |  1.98     Ca    8.3        04 Apr 2018 05:23  Ca    8.9        03 Apr 2018 14:57    Phos  1.4       04 Apr 2018 05:23      Creatinine, Serum: 6.93 mg/dL (03.12.18 @ 06:07)        Urine Studies:          RADIOLOGY & ADDITIONAL STUDIES:

## 2018-04-04 NOTE — CONSULT NOTE ADULT - SUBJECTIVE AND OBJECTIVE BOX
Patient is a 77y old  Male who presents with a chief complaint of Symptomatic Anemia (03 Apr 2018 20:28)    HPI:  This is a 77-year-old male with a past medical history significant of abdominal aortic aneurysm 5.5 cm status post NIALL R repair with stents in January 2018 also found to have right renal neoplasm status post nephrectomy in 2008 consistent with renal cell carcinoma, bladder cancer, who was previously admitted in early February for acute on chronic systolic heart failure with ejection fraction found to be at 25% and renal failure requiring initiation of renal replacement therapy ( transferred to [facility] for Endovascular revision), atrial fibrillation on Coumadin, hypertension, hyperlipidemia, coronary artery disease status post 3 stents (proximal LAD D2 and OM1 90% drug-eluting stent in January 2018), RAJEEV and pneumonia who was sent in by his nephrologist for abnormal labs as his hemoglobin was noted at 6.9.  Upon arrival to the emergency department patient was noted to have a hemoglobin of 7.4.  Patient denies any episodes of overt signs of bleeding.  Patient has never undergone a colonoscopy.  In the emergency department patient underwent guaiac which was positive. (03 Apr 2018 20:28)    4/4/2018-  Pt reports feeling much better without any SOB since his transfusion.  He is agitated he is in the hospital and wants to go home.  Reports after x1 unit of PRBCs he feels fine without any complaints.  Denies any melena or hematochezia.  States he was getting dialysis and he was told he may have a slow GI bleed.  Denies ever having a colonoscopy in the past.  Denies abdominal pain, n/v.    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)  Acute renal failure, unspecified acute renal failure type  Bladder cancer, primary, with metastasis from bladder to other site: Left kidney  CAD (coronary artery disease)  History of heart artery stent: DIONICIO  Presence of stent in LAD coronary artery  History of nephrectomy: Left  S/P AAA repair    MEDICATIONS  (STANDING):  aspirin  chewable 81 milliGRAM(s) Oral daily  atorvastatin 80 milliGRAM(s) Oral at bedtime  clopidogrel Tablet 75 milliGRAM(s) Oral daily  docusate sodium 100 milliGRAM(s) Oral two times a day  metoprolol tartrate 50 milliGRAM(s) Oral two times a day  pantoprazole  Injectable 40 milliGRAM(s) IV Push every 12 hours    MEDICATIONS  (PRN):    Allergies    heparin (Other)  penicillin (Rash)    FAMILY HISTORY:  Family history of early CAD (Father, Mother)    REVIEW OF SYSTEMS:  CONSTITUTIONAL: No weakness, fevers or chills  RESPIRATORY: Pt reports improved SOB on exertion. Denies any further SOB.  No cough, wheezing, hemoptysis.  CARDIOVASCULAR: No chest pain or palpitations  GASTROINTESTINAL: As per HPI.  All other review of systems is negative unless indicated above.    Vital Signs Last 24 Hrs  T(C): 36.5 (04 Apr 2018 04:13), Max: 37.1 (03 Apr 2018 21:04)  T(F): 97.7 (04 Apr 2018 04:13), Max: 98.8 (03 Apr 2018 21:04)  HR: 91 (04 Apr 2018 04:13) (88 - 100)  BP: 136/69 (04 Apr 2018 04:13) (117/64 - 165/87)  BP(mean): --  RR: 18 (04 Apr 2018 04:13) (16 - 18)  SpO2: 96% (04 Apr 2018 04:13) (94% - 100%)    PHYSICAL EXAM:  Constitutional: NAD, well-developed  Respiratory: CTAB  Cardiovascular: S1 and S2, RRR, no M/G/R  Gastrointestinal: BS+, soft, NT/ND  Extremities: No peripheral edema  Vascular: 2+ peripheral pulses    LABS:                        7.9    5.82  )-----------( 198      ( 04 Apr 2018 05:23 )             23.8     04-04    141  |  105  |  22  ----------------------------<  88  3.1<L>   |  28  |  1.90<H>    Ca    8.3<L>      04 Apr 2018 05:23  Phos  1.4     04-04    TPro  x   /  Alb  2.5<L>  /  TBili  x   /  DBili  x   /  AST  x   /  ALT  x   /  AlkPhos  x   04-04    PT/INR - ( 03 Apr 2018 14:57 )   PT: 36.0 sec;   INR: 3.25 ratio         PTT - ( 03 Apr 2018 14:57 )  PTT:42.6 sec  LIVER FUNCTIONS - ( 04 Apr 2018 05:23 )  Alb: 2.5 g/dL / Pro: x     / ALK PHOS: x     / ALT: x     / AST: x     / GGT: x             RADIOLOGY & ADDITIONAL STUDIES:

## 2018-04-05 ENCOUNTER — TRANSCRIPTION ENCOUNTER (OUTPATIENT)
Age: 78
End: 2018-04-05

## 2018-04-05 VITALS — WEIGHT: 136.91 LBS

## 2018-04-05 LAB
ALBUMIN SERPL ELPH-MCNC: 2.8 G/DL — LOW (ref 3.3–5)
ANION GAP SERPL CALC-SCNC: 11 MMOL/L — SIGNIFICANT CHANGE UP (ref 5–17)
BUN SERPL-MCNC: 27 MG/DL — HIGH (ref 7–23)
CALCIUM SERPL-MCNC: 9 MG/DL — SIGNIFICANT CHANGE UP (ref 8.5–10.1)
CHLORIDE SERPL-SCNC: 103 MMOL/L — SIGNIFICANT CHANGE UP (ref 96–108)
CO2 SERPL-SCNC: 22 MMOL/L — SIGNIFICANT CHANGE UP (ref 22–31)
CREAT SERPL-MCNC: 2.15 MG/DL — HIGH (ref 0.5–1.3)
GLUCOSE SERPL-MCNC: 100 MG/DL — HIGH (ref 70–99)
HCT VFR BLD CALC: 32.4 % — LOW (ref 39–50)
HGB BLD-MCNC: 10.9 G/DL — LOW (ref 13–17)
INR BLD: 2.53 RATIO — HIGH (ref 0.88–1.16)
MCHC RBC-ENTMCNC: 28.5 PG — SIGNIFICANT CHANGE UP (ref 27–34)
MCHC RBC-ENTMCNC: 33.6 GM/DL — SIGNIFICANT CHANGE UP (ref 32–36)
MCV RBC AUTO: 84.6 FL — SIGNIFICANT CHANGE UP (ref 80–100)
NRBC # BLD: 0 /100 WBCS — SIGNIFICANT CHANGE UP (ref 0–0)
PHOSPHATE SERPL-MCNC: 2 MG/DL — LOW (ref 2.5–4.5)
PLATELET # BLD AUTO: 228 K/UL — SIGNIFICANT CHANGE UP (ref 150–400)
POTASSIUM SERPL-MCNC: 3.3 MMOL/L — LOW (ref 3.5–5.3)
POTASSIUM SERPL-SCNC: 3.3 MMOL/L — LOW (ref 3.5–5.3)
PROTHROM AB SERPL-ACNC: 27.9 SEC — HIGH (ref 9.8–12.7)
RBC # BLD: 3.83 M/UL — LOW (ref 4.2–5.8)
RBC # FLD: 19.1 % — HIGH (ref 10.3–14.5)
SODIUM SERPL-SCNC: 136 MMOL/L — SIGNIFICANT CHANGE UP (ref 135–145)
WBC # BLD: 8.47 K/UL — SIGNIFICANT CHANGE UP (ref 3.8–10.5)
WBC # FLD AUTO: 8.47 K/UL — SIGNIFICANT CHANGE UP (ref 3.8–10.5)

## 2018-04-05 RX ORDER — AMLODIPINE BESYLATE 2.5 MG/1
1 TABLET ORAL
Qty: 30 | Refills: 0
Start: 2018-04-05 | End: 2018-05-04

## 2018-04-05 RX ORDER — PANTOPRAZOLE SODIUM 20 MG/1
1 TABLET, DELAYED RELEASE ORAL
Qty: 30 | Refills: 0
Start: 2018-04-05 | End: 2018-05-04

## 2018-04-05 RX ORDER — PANTOPRAZOLE SODIUM 20 MG/1
1 TABLET, DELAYED RELEASE ORAL
Qty: 30 | Refills: 0 | OUTPATIENT
Start: 2018-04-05 | End: 2018-05-04

## 2018-04-05 RX ORDER — AMLODIPINE BESYLATE 2.5 MG/1
2.5 TABLET ORAL DAILY
Qty: 0 | Refills: 0 | Status: DISCONTINUED | OUTPATIENT
Start: 2018-04-05 | End: 2018-04-05

## 2018-04-05 RX ADMIN — Medication 50 MILLIGRAM(S): at 11:13

## 2018-04-05 RX ADMIN — PANTOPRAZOLE SODIUM 40 MILLIGRAM(S): 20 TABLET, DELAYED RELEASE ORAL at 05:30

## 2018-04-05 RX ADMIN — CLOPIDOGREL BISULFATE 75 MILLIGRAM(S): 75 TABLET, FILM COATED ORAL at 11:13

## 2018-04-05 NOTE — CONSULT NOTE ADULT - ATTENDING COMMENTS
Anemia he is s/p blood transfusion & he feels better.  Chronic systolic/diastolic congestive heart failure. He is stable   Paroxysmal atrial fibrillation. He is now in normal sinus rhythm.  Small to moderate pericardial effusion  .  Coronary artery disease status post multiple stents January 2018.  Aortic abdominal aneurysm status post endovascular repair.  Hypertension.  End-stage renal disease he is on hemodialysis.  Renal and bladder cancer in the past status post surgery.  Suggest  Continue with current cardiac medications   .  Adjust INR 2   Start Norvasc 2.5milligram by mouth once daily.  Due to paroxysmal atrial fibrillation patient was started on amiodarone but patient stopped it. He should be on low doses of amiodarone 100 mg by mouth once daily to prevent atrial fibrillation.  Follow-up with electrolytes.  Follow up with H/H.  Follow low sodium low cholesterol diet.  Gradusl ambulation.  Discussed with the hospitalist .  Discussed with patient's daughter.

## 2018-04-05 NOTE — DISCHARGE NOTE ADULT - CARE PROVIDERS DIRECT ADDRESSES
[email or web address],DirectAddress_Unknown,DirectAddress_Unknown

## 2018-04-05 NOTE — DISCHARGE NOTE ADULT - CARE PROVIDER_API CALL
José Miguel Saul), Internal Medicine  [address] [place] [address]  Phone: [phone]  Fax: [phone]    Vladimir Jones (MD), Cardiovascular Disease; Internal Medicine  [address] [place] [address]  Phone: [phone]  Fax: [phone]    Jose Weir), Gastroenterology; Internal Medicine  [address] [place] [address]  Phone: [phone]  Fax: [phone]    Dr. Cano and Dr. Dang,   Phone: (   )    -  Fax: (   )    -

## 2018-04-05 NOTE — DISCHARGE NOTE ADULT - HOSPITAL COURSE
Assessment and Plan:   77y male w/     *acute blood loss anemia r/o GIB  - fobt positive in ED   - s/p 1uprbc yesterday and will transfuse 1 unit today--> monitor volume status w/ CHF  - CT abd no acute abnormalities  - GI f/u appreciated- agree to monitor h/h and if pt stable can f/u in office for colonoscopy   4/5- h/h improved s/p 2 units, stable - pt and family to follow up outpatient for colonoscopy.   Discussed w/ GI- will see him in office.     *DONNIE requiring acute HD (4/2) due to  recent renal artery occlusion requiring stenting  - Nephrology f/u appreciated- discussed w/ Vascular fellow- rec not holding plavix due to high risk for RA stent thrombosis  - and keep HD cath in place until f/u w/ Surgeon next week  - monitor renal function, no contrast or nephrotoxic meds  4/5- discussed w/ Nephrology- pt's Surgeon would like him to follow up early next week and pt is to remain on plavix for at least 2 more weeks    *CAD s/p 3 stents 1/2018  - monitor h/h  - statin, BB  - Cardio consult appreciated---> agree to continuing coumadin for PAF and plavix and holding aspirin for now;  also rec repeat echo prior to discharge since difficult getting pt to follow up     *PAF   - continue BB  - hold coumadin, repeat INR  - Cardio f/u appreciated   4/5- discussed w/ Cardio-  INR therapeutic 2.53 today,  continue coumadin and must have INR checked tomorrow     *chronic systolic/diastolic CHF  - stable, monitor respiratory status w/ transfusion    *HTN  - continue home meds    *dvt px

## 2018-04-05 NOTE — DISCHARGE NOTE ADULT - CARE PLAN
Principal Discharge DX:	Anemia, unspecified type  Goal:	close follow up with Dr. Weir and your Vascular surgeon early next week  Assessment and plan of treatment:	Follow up with your Vascular surgeon early next week.  You need repeat labs.  Continue plavix and your coumadin.   Stop aspirin.   Return to ED if you have any blood in stool.  Secondary Diagnosis:	DONNIE (acute kidney injury)  Goal:	follow up with your Vascular surgeon early next week- call to make appt  Assessment and plan of treatment:	Follow up with Dr. Cano early next week to discuss catheter and repeat labs.  You must continue plavix.     No NSAIDs.  Secondary Diagnosis:	PAF (paroxysmal atrial fibrillation)  Goal:	INR repeat tomorrow (Friday) goal 2-3  Assessment and plan of treatment:	Discussed w/ Dr. Jones- you should remain on coumadin.  INR today is 2.53.    Take your coumadin tonight and have INR checked tomorrow.   Follow up with Dr. Jones in office  Secondary Diagnosis:	CAD (coronary artery disease)  Goal:	no chest pain or shortness of breath  Assessment and plan of treatment:	continue plavix.  Stop aspirin for now.  Follow up with Dr. Jones. Principal Discharge DX:	Anemia, unspecified type  Goal:	close follow up with Dr. Weir and your Vascular surgeon early next week  Assessment and plan of treatment:	Follow up with your Vascular surgeon early next week.  You need repeat labs.  Continue plavix and your coumadin.   Stop aspirin.   Return to ED if you have any blood in stool.  Secondary Diagnosis:	DONNIE (acute kidney injury)  Goal:	follow up with your Vascular surgeon early next week- call to make appt  Assessment and plan of treatment:	Follow up with Dr. Cano early next week to discuss catheter and repeat labs.  You must continue plavix.     No NSAIDs.  Secondary Diagnosis:	PAF (paroxysmal atrial fibrillation)  Goal:	INR repeat tomorrow (Friday) goal 2-3  Assessment and plan of treatment:	Discussed w/ Dr. Jones- you should remain on coumadin.  INR today is 2.53.    Take your coumadin tonight and have INR checked tomorrow.   Follow up with Dr. Jones in office  Secondary Diagnosis:	CAD (coronary artery disease)  Goal:	no chest pain or shortness of breath  Assessment and plan of treatment:	continue plavix.  Stop aspirin for now.  Follow up with Dr. Jones.  You must call office and schedule an appt for Echocardiogram.

## 2018-04-05 NOTE — DISCHARGE NOTE ADULT - PLAN OF CARE
INR repeat tomorrow (Friday) goal 2-3 Discussed w/ Dr. Jones- you should remain on coumadin.  INR today is 2.53.    Take your coumadin tonight and have INR checked tomorrow.   Follow up with Dr. Jones in office no chest pain or shortness of breath continue plavix.  Stop aspirin for now.  Follow up with Dr. Jones. close follow up with Dr. Weir and your Vascular surgeon early next week Follow up with your Vascular surgeon early next week.  You need repeat labs.  Continue plavix and your coumadin.   Stop aspirin.   Return to ED if you have any blood in stool. follow up with your Vascular surgeon early next week- call to make appt Follow up with Dr. Cano early next week to discuss catheter and repeat labs.  You must continue plavix.     No NSAIDs. continue plavix.  Stop aspirin for now.  Follow up with Dr. Jones.  You must call office and schedule an appt for Echocardiogram.

## 2018-04-05 NOTE — DISCHARGE NOTE ADULT - MEDICATION SUMMARY - MEDICATIONS TO TAKE
I will START or STAY ON the medications listed below when I get home from the hospital:    warfarin  -- 2.5 milligram(s) by mouth once a day  -- Indication: For Home med    Lipitor 80 mg oral tablet  -- 1 tab(s) by mouth once a day  -- Indication: For Home med    Plavix 75 mg oral tablet  -- 1 tab(s) by mouth once a day  -- Indication: For Home med    Lopressor 50 mg oral tablet  -- 1 tab(s) by mouth 2 times a day  -- Indication: For Home med    Norvasc 2.5 mg oral tablet  -- 1 tab(s) by mouth once a day  -- Indication: For Home med    Colace 100 mg oral capsule  -- 1 cap(s) by mouth 2 times a day, As Needed - for constipation  -- Indication: For Home med    Protonix 40 mg oral delayed release tablet  -- 1 tab(s) by mouth once a day   -- It is very important that you take or use this exactly as directed.  Do not skip doses or discontinue unless directed by your doctor.  Obtain medical advice before taking any non-prescription drugs as some may affect the action of this medication.  Swallow whole.  Do not crush.    -- Indication: For while on multiple blood thinners

## 2018-04-05 NOTE — DISCHARGE NOTE ADULT - PROVIDER TOKENS
I will START or STAY ON the medications listed below when I get home from the hospital:    acetaminophen 325 mg oral tablet  -- 2 tab(s) by mouth every 6 hours, As needed, Moderate Pain (4 - 6)  -- Indication: For pain    Augmentin 875 mg-125 mg oral tablet  -- 1 tab(s) by mouth every 12 hours   -- Finish all this medication unless otherwise directed by prescriber.  Take with food or milk.    -- Indication: For prophylaxis while nasal packing in place TOKEN:'[id number]:'[id number]:'[id number]',FREE:[LAST:[Dr. Cano and Dr. Dang],PHONE:[(   )    -],FAX:[(   )    -]]

## 2018-04-05 NOTE — DISCHARGE NOTE ADULT - PATIENT PORTAL LINK FT
You can access the [facility] Patient Portal, offered by [facility], by registering with the following website: [email or web address]

## 2018-04-05 NOTE — PROGRESS NOTE ADULT - SUBJECTIVE AND OBJECTIVE BOX
cc: sent in b/c anemia  hpi: 77y male w/ PMH rt renal neoplasm s/p nephrectomy 2008, RCC, bladder cancer, CAD s/p 3 stents 1/2018, AAA s/p EVAR 1/2018 and DONNIE w/ occlusion of rt renal artery  requiring urgent HD.  Pt had renal artery stenting 2 weeks ago to salvage solitary kidney.  He required HD until this week w/ improving Cr.   He was sent in by his Nephrologist b/c anemia Hg 6.9.    Pt denies having active bleeding but FOBT positive in Ed.  Pt never had colonoscopy.  Very upset he's here and would like to hold off on colonoscopy if possible.  Daughter at bedside answered most questions.   Pt denies cp, sob, palp, abd pain, n/v/d, f/u/d, denies blood in stool.      4/5- h/h improved, pt denies blood in stool- no abd pain, no cp, sob, palp.  Very anxious and wants to leave now.      ros- as per hpi above, otherwise 10 pt ros neg    Vital Signs Last 24 Hrs  T(C): 36.6 (05 Apr 2018 11:25), Max: 36.7 (04 Apr 2018 17:37)  T(F): 97.9 (05 Apr 2018 11:25), Max: 98.1 (04 Apr 2018 17:37)  HR: 106 (05 Apr 2018 11:25) (85 - 106)  BP: 142/74 (05 Apr 2018 11:25) (125/66 - 157/78)  BP(mean): --  RR: 18 (05 Apr 2018 11:25) (18 - 18)  SpO2: 94% (05 Apr 2018 11:25) (93% - 99%)  T(C): 36.5 (04 Apr 2018 04:13), Max: 37.1 (03 Apr 2018 21:04)  T(F): 97.7 (04 Apr 2018 04:13), Max: 98.8 (03 Apr 2018 21:04)  HR: 101 (04 Apr 2018 09:51) (90 - 101)  BP: 134/77 (04 Apr 2018 09:51) (132/80 - 165/87)  BP(mean): --  RR: 18 (04 Apr 2018 04:13) (16 - 18)  SpO2: 96% (04 Apr 2018 04:13) (94% - 100%)      PHYSICAL EXAM:    General: pleasant male, comfortable, NAD  Neuro: AAOx3, no focal deficits  HEENT: NCAT  Neck: Soft and supple, No JVD  Respiratory: CTA b/l, no w/r/r  Cardiovascular: S1 and S2, RRR  - rt chest wall temp. HD cath   Gastrointestinal: +BS, soft, NTND, no rebound/guarding  Extremities: No c/c/e  Vascular: 2+ peripheral pulses  Musculoskeletal: 5/5 strength all extremities; ambulating        LABS: All Labs Reviewed:                        10.9   8.47  )-----------( 228      ( 05 Apr 2018 05:39 )             32.4     04-05    136  |  103  |  27<H>  ----------------------------<  100<H>  3.3<L>   |  22  |  2.15<H>    Ca    9.0      05 Apr 2018 05:39  Phos  2.0     04-05    TPro  x   /  Alb  2.8<L>  /  TBili  x   /  DBili  x   /  AST  x   /  ALT  x   /  AlkPhos  x   04-05    PT/INR - ( 05 Apr 2018 10:10 )   PT: 27.9 sec;   INR: 2.53 ratio         PTT - ( 03 Apr 2018 14:57 )  PTT:42.6 sec            LABS: All Labs Reviewed:                        7.8    x     )-----------( x        ( 04 Apr 2018 12:42 )             24.0     04-04    141  |  105  |  22  ----------------------------<  88  3.1<L>   |  28  |  1.90<H>    Ca    8.3<L>      04 Apr 2018 05:23  Phos  1.4     04-04    TPro  x   /  Alb  2.5<L>  /  TBili  x   /  DBili  x   /  AST  x   /  ALT  x   /  AlkPhos  x   04-04    PT/INR - ( 04 Apr 2018 10:45 )   PT: 29.4 sec;   INR: 2.67 ratio         PTT - ( 03 Apr 2018 14:57 )  PTT:42.6 sec    < from: CT Abdomen and Pelvis No Cont (04.03.18 @ 23:01) >  IMPRESSION:   Bilateral pleural effusions and bilateral lower lobes infiltrates and   atelectatic changes for which clinical correlation is recommended.  No acute abnormalitiesin the abdomen or pelvis within limits of a   noncontrast study.  Constipation and rectal fecal impaction.    < end of copied text >      MEDICATIONS  (STANDING):  amLODIPine   Tablet 2.5 milliGRAM(s) Oral daily  aspirin  chewable 81 milliGRAM(s) Oral daily  atorvastatin 80 milliGRAM(s) Oral at bedtime  clopidogrel Tablet 75 milliGRAM(s) Oral daily  docusate sodium 100 milliGRAM(s) Oral two times a day  metoprolol tartrate 50 milliGRAM(s) Oral two times a day  pantoprazole  Injectable 40 milliGRAM(s) IV Push every 12 hours    MEDICATIONS  (PRN):        Assessment and Plan:   77y male w/     *acute blood loss anemia r/o GIB  - fobt positive in ED   - s/p 1uprbc yesterday and will transfuse 1 unit today--> monitor volume status w/ CHF  - CT abd no acute abnormalities  - GI f/u appreciated- agree to monitor h/h and if pt stable can f/u in office for colonoscopy   4/5- h/h improved s/p 2 units, stable - pt and family want to follow up outpatient for colonoscopy.   Discussed w/ GI- will see him in office.     *DONNIE requiring acute HD (4/2) due to  recent renal artery occlusion requiring stenting  - Nephrology f/u appreciated- discussed w/ Vascular fellow- rec not holding plavix due to high risk for RA stent thrombosis  - and keep HD cath in place until f/u w/ Surgeon next week  - monitor renal function, no contrast or nephrotoxic meds  4/5- discussed w/ Nephrology- pt's Surgeon would like him to follow up early next week and pt is to remain on plavix for at least 2 more weeks    *CAD s/p 3 stents 1/2018  - continue aspirin and plavix as above  - monitor h/h  - statin, BB  - Cardio consult appreciated---> agree to continuing coumadin for PAF and plavix and holding aspirin;  also rec repeat echo prior to discharge since difficult getting pt to follow up     *PAF   - continue BB  - hold coumadin, repeat INR  - Cardio f/u appreciated   4/5- discussed w/ Cardio-  INR therapeutic 2.53 today,  continue coumadin and must have INR checked tomorrow     *chronic systolic/diastolic CHF  - stable, monitor respiratory status w/ transfusion    *HTN  - continue home meds    *dvt px     Stable for d/c planning home w/ family.   Will discuss w/ PMD.  Outpatient f/u w/ his Surgeon next week and GI.  Also f/u Nephrology and Cardiology.   Time to d/c > 60min.

## 2018-04-05 NOTE — PROGRESS NOTE ADULT - ASSESSMENT
78 y/o male with SOB/anemia/+ occult stool.    Impression:  Acute on chronic anemia.    Plan:  H/H-stable-s/p PRBCs  Pt to f/u as outpatient for endoscopic workup for anemia once cleared by cardiology.  On coumadin, plavix, and asa for afib,CAD s/p x3 stents, and AAA.    Will no longer follow, please reconsult if needed.    Discussed plan with pt, daughter, Dr. Weir, and Dr. Bar.

## 2018-04-05 NOTE — PROGRESS NOTE ADULT - ASSESSMENT
76 yo with solitary kidney s/p EVAR and renal artery occlusion leading to dialysis now - s.p recent renal artery stenting for salvage.    DONNIE - now recovering s/p renal arterty stent in late March   - appears to be in recovery - no further HD at this time - discussed with Dr Cano - reccomended to keep catheter in place as he is concerned about stent  stability     - he will see patient in a week at discharge next week and if stable will remove TDC then , will need to repeat labs early next week.    - d./w vascular fellow - Dr Rico - would NOT hold Plavix at this time as high risk for RA stent thrombosis at least for 30 days   - avoid all nephrotoxins - no IV contrast, nsaid;s   - Dose meds Cr Cl ~ 30 - 35 cc.min   - Avoid ace or arb     Anemia  + Guiac pos  - GI workup - will need colonscopy when Plavix can be held temporarily and pt currently refusing to have colonscopy as inpatient    - in light of constipation and CT findings - reiterated to daughter importance of having colonscopy - as pt high risk for colon cancer    HTN   - continue home meds : metoprolol and amlodipine     At discharge may followup with Dr Stevens as pt known to him from previous admission    for labs next week     D/w Dr Bar and left message for daughter - Jennifer       Thank you for the courtesy of this consult. We will follow this patient with you.   Management is subject to change if new information becomes available or patient condition changes.

## 2018-04-05 NOTE — PROGRESS NOTE ADULT - SUBJECTIVE AND OBJECTIVE BOX
This is a 77-year-old male with a past medical history significant of right renal neoplasm status post nephrectomy in 2008  consistent with renal cell carcinoma, bladder cancer CAD with 3 stents in Jan 2018 , abdominal aortic aneurysm  5.5 cm status post NIALL R repair in January 2018 and renal failure w occlusion of right renal artery post EVAR at [facility] requiring initiation of renal replacement therapy  Also found to have in February for acute on chronic systolic heart failure with ejection fraction found to be at 25% . Last month pt was transferred to [facility]  to salvage solitary kidney underwent renal artery stenting 2 weeks ago. Pt continued to  require HD until recently Cr has been improving this week.   Now sent in by me after HD labs revealed Hb dropping from 6.9 range and Cr down to 2's.    Upon arrival to the emergency department patient was noted to have a hemoglobin of 7.4.  Patient denies any episodes of overt signs of bleeding.  Patient has never undergone a colonoscopy.  In the emergency department patient underwent guaiac which was positive. Denies abd pains , constipated on and off x months  hx taken from daughter as pt poor historian       today   pt anxious   very forgetful   wants to go home        PAST MEDICAL & SURGICAL HISTORY:  CAD (coronary artery disease)  History of heart artery stent: DIONICIO  Presence of stent in LAD coronary artery  History of nephrectomy: Left  S/P AAA repair  right renal artery stent    MEDICATIONS  (STANDING):  amLODIPine   Tablet 2.5 milliGRAM(s) Oral daily  aspirin  chewable 81 milliGRAM(s) Oral daily  atorvastatin 80 milliGRAM(s) Oral at bedtime  clopidogrel Tablet 75 milliGRAM(s) Oral daily  docusate sodium 100 milliGRAM(s) Oral two times a day  metoprolol tartrate 50 milliGRAM(s) Oral two times a day  pantoprazole  Injectable 40 milliGRAM(s) IV Push every 12 hours      Allergies    heparin (Other)    Intolerances    REVIEW OF SYSTEMS:    CONSTITUTIONAL: No weakness, fevers or chills  EYES/ENT: No visual changes;  No vertigo or throat pain   NECK: No pain or stiffness  RESPIRATORY: No cough, wheezing, hemoptysis; No shortness of breath  CARDIOVASCULAR: No chest pain or palpitations  GASTROINTESTINAL: No abdominal or epigastric pain. No nausea, vomiting, or hematemesis; No diarrhea or constipation. No melena or hematochezia.  GENITOURINARY: No dysuria, frequency or hematuria  NEUROLOGICAL: No numbness or weakness  SKIN: No itching, burning, rashes, or lesions   All other review of systems is negative unless indicated above.      Vital Signs Last 24 Hrs  T(C): 36.6 (05 Apr 2018 11:25), Max: 36.7 (04 Apr 2018 17:37)  T(F): 97.9 (05 Apr 2018 11:25), Max: 98.1 (04 Apr 2018 17:37)  HR: 106 (05 Apr 2018 11:25) (85 - 106)  BP: 142/74 (05 Apr 2018 11:25) (125/66 - 157/78)  BP(mean): --  RR: 18 (05 Apr 2018 11:25) (18 - 18)  SpO2: 94% (05 Apr 2018 11:25) (93% - 99%)        PHYSICAL EXAM:    Constitutional: NAD  HEENT: PERRLA, EOMI,  MMM  Neck: No LAD, No JVD  Respiratory: CTAB  Cardiovascular: S1 and S2  Gastrointestinal: BS+, soft, NT/ND  Extremities: No peripheral edema  Neurological: A/O x 3, no focal deficits  Psychiatric: Normal mood, normal affect  : No [place]  Skin: No rashes  Access: Not applicable    LABS:                                          10.9   8.47  )-----------( 228      ( 05 Apr 2018 05:39 )             32.4                         7.8    x     )-----------( x        ( 04 Apr 2018 12:42 )             24.0     136    |  103    |  27     ----------------------------<  100       05 Apr 2018 05:39  3.3     |  22     |  2.15     141    |  105    |  22     ----------------------------<  88        04 Apr 2018 05:23  3.1     |  28     |  1.90     136    |  99     |  21     ----------------------------<  92        03 Apr 2018 14:57  3.7     |  33     |  1.98     Ca    9.0        05 Apr 2018 05:39  Ca    8.3        04 Apr 2018 05:23    Phos  2.0       05 Apr 2018 05:39            Creatinine, Serum: 6.93 mg/dL (03.12.18 @ 06:07)        Urine Studies:          RADIOLOGY & ADDITIONAL STUDIES:

## 2018-04-05 NOTE — PROGRESS NOTE ADULT - SUBJECTIVE AND OBJECTIVE BOX
Patient is a 77y old  Male who presents with a chief complaint of Symptomatic Anemia (03 Apr 2018 20:28)    HPI:  This is a 77-year-old male with a past medical history significant of abdominal aortic aneurysm 5.5 cm status post NIALL R repair with stents in January 2018 also found to have right renal neoplasm status post nephrectomy in 2008 consistent with renal cell carcinoma, bladder cancer, who was previously admitted in early February for acute on chronic systolic heart failure with ejection fraction found to be at 25% and renal failure requiring initiation of renal replacement therapy ( transferred to [facility] for Endovascular revision), atrial fibrillation on Coumadin, hypertension, hyperlipidemia, coronary artery disease status post 3 stents (proximal LAD D2 and OM1 90% drug-eluting stent in January 2018), RAJEEV and pneumonia who was sent in by his nephrologist for abnormal labs as his hemoglobin was noted at 6.9.  Upon arrival to the emergency department patient was noted to have a hemoglobin of 7.4.  Patient denies any episodes of overt signs of bleeding.  Patient has never undergone a colonoscopy.  In the emergency department patient underwent guaiac which was positive. (03 Apr 2018 20:28)    4/5/2018-  Pt reports feeling find and eager to go home.  Spoke with daughter on phone at bedside and wishes for her father to f/u as outpatient.  Denies any fevers, n/v, abdominal pain, melena, or hematochezia.    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)  Acute renal failure, unspecified acute renal failure type  Bladder cancer, primary, with metastasis from bladder to other site: Left kidney  CAD (coronary artery disease)  History of heart artery stent: DIONICIO  Presence of stent in LAD coronary artery  History of nephrectomy: Left  S/P AAA repair    MEDICATIONS  (STANDING):  aspirin  chewable 81 milliGRAM(s) Oral daily  atorvastatin 80 milliGRAM(s) Oral at bedtime  clopidogrel Tablet 75 milliGRAM(s) Oral daily  docusate sodium 100 milliGRAM(s) Oral two times a day  metoprolol tartrate 50 milliGRAM(s) Oral two times a day  pantoprazole  Injectable 40 milliGRAM(s) IV Push every 12 hours    MEDICATIONS  (PRN):    Allergies    heparin (Other)  penicillin (Rash)    FAMILY HISTORY:  Family history of early CAD (Father, Mother)    REVIEW OF SYSTEMS:  CONSTITUTIONAL: No weakness, fevers or chills  RESPIRATORY: No cough, wheezing, hemoptysis; No shortness of breath  CARDIOVASCULAR: No chest pain or palpitations  GASTROINTESTINAL: As per HPI.    Vital Signs Last 24 Hrs  T(C): 36.4 (05 Apr 2018 04:26), Max: 36.7 (04 Apr 2018 17:37)  T(F): 97.5 (05 Apr 2018 04:26), Max: 98.1 (04 Apr 2018 17:37)  HR: 99 (05 Apr 2018 04:26) (85 - 101)  BP: 157/78 (05 Apr 2018 04:26) (125/66 - 157/78)  BP(mean): --  RR: 18 (05 Apr 2018 04:26) (18 - 18)  SpO2: 93% (05 Apr 2018 04:26) (93% - 99%)    PHYSICAL EXAM:  Constitutional: NAD, well-developed  Respiratory: CTAB  Cardiovascular: S1 and S2, RRR, no M/G/R  Gastrointestinal: BS+, soft, NT/ND  Extremities: No peripheral edema  Vascular: 2+ peripheral pulses    LABS:                        10.9   8.47  )-----------( 228      ( 05 Apr 2018 05:39 )             32.4     04-05    136  |  103  |  27<H>  ----------------------------<  100<H>  3.3<L>   |  22  |  2.15<H>    Ca    9.0      05 Apr 2018 05:39  Phos  2.0     04-05    TPro  x   /  Alb  2.8<L>  /  TBili  x   /  DBili  x   /  AST  x   /  ALT  x   /  AlkPhos  x   04-05    PT/INR - ( 04 Apr 2018 10:45 )   PT: 29.4 sec;   INR: 2.67 ratio         PTT - ( 03 Apr 2018 14:57 )  PTT:42.6 sec  LIVER FUNCTIONS - ( 05 Apr 2018 05:39 )  Alb: 2.8 g/dL / Pro: x     / ALK PHOS: x     / ALT: x     / AST: x     / GGT: x             RADIOLOGY & ADDITIONAL STUDIES:

## 2018-04-05 NOTE — CONSULT NOTE ADULT - SUBJECTIVE AND OBJECTIVE BOX
Patient is a 77y old  Male who presents with a chief complaint of Symptomatic Anemia ,.      HPI:  Patient is 77-year-old he has a history of hypertension, high cholesterol, coronary artery disease, chronic left bundle branch block, history of bladder cancer status post left nephrectomy in the past, carotid artery disease he has nonobstructive carotid artery disease by MRA of the neck in April 2017 he has  50-60% left ICA stenosis, aortic abdominal aneurysm and he status post endovascular repair in January 2018, his  postoperative course was complicated by development of congestive heart failure and his echocardiogram has revealed left ventricular ejection fraction of approximately 25%, LVEF now 45%,  he is status post cardiac catheterization and he status post stent placement to proximal LAD, diagonal 2 and 1 circumflex. He had  drug-eluting stent placed. After his AAA repair, he had developed end-stage renal disease requiring hemodialysis, he was admitted to [facility] in February with congestive heart failure and pneumonia, at that time he was diagnosed to have atrial fibrillation with rapid ventricular response, his heart rate was difficult to control and he was started on amiodarone, with that he reverted to normal sinus rhythm. Patient has stopped his amiodarone. He was admitted because of symptomatic anemia and his guaiac positive. He has received blood transfusion and he is now stable. He denies any chest pain or shortness of breath. He is hemodynamically stable. His blood pressure is mildly elevated.       Transthoracic Echocardiogram (02.26.18   Summary     Endocardium is not always well visualized, however, overall left   ventricular systolic functionappears mildly decreased, with mild   hypokinesis of the anterolateral wall. Paradoxical septal motion is also   present. Estimated left ventricular ejection fraction is 45 %.   Significant fibrocalcific changes noted to the aortic valve leaflets with   restriction in leaflet excursion. Peak transaortic gradient is 15 mmHg;   this finding is consistent with mild aortic stenosis.   Trace aortic regurgitation is present.   The mitral valve leaflets appear thickened.   Moderate mitral annular calcification is present.   Trace mitral regurgitation is present.   EA reversal of the mitral inflow consistent with reduced compliance of   the   left ventricle.   A mild to moderate sized pericardial effusion is present.          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)  Acute renal failure, unspecified acute renal failure type  Bladder cancer, primary, with metastasis from bladder to other site: Left kidney  CAD (coronary artery disease)  History of heart artery stent: DIONICIO  Presence of stent in LAD coronary artery  History of nephrectomy: Left  S/P AAA repair        MEDICATIONS  (STANDING):  amLODIPine   Tablet 2.5 milliGRAM(s) Oral daily  aspirin  chewable 81 milliGRAM(s) Oral daily  atorvastatin 80 milliGRAM(s) Oral at bedtime  clopidogrel Tablet 75 milliGRAM(s) Oral daily  docusate sodium 100 milliGRAM(s) Oral two times a day  metoprolol tartrate 50 milliGRAM(s) Oral two times a day  pantoprazole  Injectable 40 milliGRAM(s) IV Push every 12 hours    MEDICATIONS  (PRN):      FAMILY HISTORY:  Family history of early CAD (Father, Mother)      SOCIAL HISTORY: he denies any history of smoking or any alcohol consumption.    REVIEW OF SYSTEM:  Pertinent items are noted in HPI.  Constitutional	Negative for chills, fevers, sweats.    Eyes: 	Negative for visual disturbance.  Ears, nose, mouth, throat, and face: Negative for epistaxis, nasal congestion, sore throat   Neck:	Negative for enlargement, pain and difficulty in swallowing  Respiration : Negative for cough,  pleuritic chest pain and wheezing  Cardiovascular: Negative for chest pain,  and palpitations    Gastrointestinal : Negative for abdominal pain, diarrhea, nausea and vomiting  Genitourinary: Negative for dysuria, frequency and urinary incontinence .  Skin: Negative for  rash, pruritus, swelling, dryness .  	  Hematologic/lymphatic: Negative for bleeding and easy bruising  Musculoskeletal: Negative for arthralgias, back pain and muscle weakness.  Neurological: Negative for dizziness, headaches, seizures and tremors  Behavioral/Psych: Negative for mood change, depression.  Endocrine:	Negative for blurry vision, polydipsia and polyuria, diaphoresis.   Allergic/Immunologic:	Negative for anaphylaxis, angioedema and urticaria.      Vital Signs Last 24 Hrs  T(C): 36.4 (05 Apr 2018 04:26), Max: 36.7 (04 Apr 2018 17:37)  T(F): 97.5 (05 Apr 2018 04:26), Max: 98.1 (04 Apr 2018 17:37)  HR: 99 (05 Apr 2018 04:26) (85 - 99)  BP: 157/78 (05 Apr 2018 04:26) (125/66 - 157/78)  BP(mean): --  RR: 18 (05 Apr 2018 04:26) (18 - 18)  SpO2: 93% (05 Apr 2018 04:26) (93% - 99%)    I&O's Summary    04 Apr 2018 07:01  -  05 Apr 2018 07:00  --------------------------------------------------------  IN: 129 mL / OUT: 500 mL / NET: -371 mL      PHYSICAL EXAM  General Appearance: cooperative, no acute distress,   HEENT: PERRL, conjunctiva clear, EOM's intact, non injected pharynx, no exudate, TM   normal  Neck: Supple, , no adenopathy, thyroid: not enlarged, no carotid bruit or JVD  Back: Symmetric, no  tenderness,no soft tissue tenderness  Lungs: Clear to auscultation bilateral,no adventitious breath sounds, normal   expiratory phase  Heart: Regular rate and rhythm, S1, S2 normal, no murmur, rub or gallop  Abdomen: Soft, non-tender, bowel sounds active , no hepatosplenomegaly  Extremities: no cyanosis or edema, no joint swelling  Skin: Skin color, texture normal, no rashes   Neurologic: Alert and oriented X3 , cranial nerves intact, sensory and motor normal,          ECG:        LABS:                          10.9   8.47  )-----------( 228      ( 05 Apr 2018 05:39 )             32.4     04-05    136  |  103  |  27<H>  ----------------------------<  100<H>  3.3<L>   |  22  |  2.15<H>    Ca    9.0      05 Apr 2018 05:39  Phos  2.0     04-05    TPro  x   /  Alb  2.8<L>  /  TBili  x   /  DBili  x   /  AST  x   /  ALT  x   /  AlkPhos  x   04-05            PT/INR - ( 05 Apr 2018 10:10 )   PT: 27.9 sec;   INR: 2.53 ratio         PTT - ( 03 Apr 2018 14:57 )  PTT:42.6 sec Patient is a 77y old  Male who presents with a chief complaint of Symptomatic Anemia ,.      HPI:  Patient is 77-year-old he has a history of hypertension, high cholesterol, coronary artery disease, chronic left bundle branch block, history of bladder cancer status post left nephrectomy in the past, carotid artery disease he has nonobstructive carotid artery disease by MRA of the neck in April 2017 he has  50-60% left ICA stenosis, aortic abdominal aneurysm and he status post endovascular repair in January 2018, his  postoperative course was complicated by development of congestive heart failure and his echocardiogram has revealed left ventricular ejection fraction of approximately 25%, LVEF now 45%,  he is status post cardiac catheterization and he status post stent placement to proximal LAD, diagonal 2 and 1 circumflex. He had  drug-eluting stent placed. After his AAA repair, he had developed end-stage renal disease requiring hemodialysis, he was admitted to [facility] in February with congestive heart failure and pneumonia, at that time he was diagnosed to have atrial fibrillation with rapid ventricular response, his heart rate was difficult to control and he was started on amiodarone, with that he reverted to normal sinus rhythm. Patient has stopped his amiodarone. He was admitted because of symptomatic anemia and his guaiac positive. He has received blood transfusion and he is now stable. He denies any chest pain or shortness of breath. He is hemodynamically stable. His blood pressure is mildly elevated.       Transthoracic Echocardiogram (02.26.18   Summary     Endocardium is not always well visualized, however, overall left   ventricular systolic functionappears mildly decreased, with mild   hypokinesis of the anterolateral wall. Paradoxical septal motion is also   present. Estimated left ventricular ejection fraction is 45 %.   Significant fibrocalcific changes noted to the aortic valve leaflets with   restriction in leaflet excursion. Peak transaortic gradient is 15 mmHg;   this finding is consistent with mild aortic stenosis.   Trace aortic regurgitation is present.   The mitral valve leaflets appear thickened.   Moderate mitral annular calcification is present.   Trace mitral regurgitation is present.   EA reversal of the mitral inflow consistent with reduced compliance of   the   left ventricle.   A mild to moderate sized pericardial effusion is present.          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)  Acute renal failure, unspecified acute renal failure type  Bladder cancer, primary, with metastasis from bladder to other site: Left kidney  CAD (coronary artery disease)  History of heart artery stent: DIONICIO  Presence of stent in LAD coronary artery  History of nephrectomy: Left  S/P AAA repair        MEDICATIONS  (STANDING):  amLODIPine   Tablet 2.5 milliGRAM(s) Oral daily  aspirin  chewable 81 milliGRAM(s) Oral daily  atorvastatin 80 milliGRAM(s) Oral at bedtime  clopidogrel Tablet 75 milliGRAM(s) Oral daily  docusate sodium 100 milliGRAM(s) Oral two times a day  metoprolol tartrate 50 milliGRAM(s) Oral two times a day  pantoprazole  Injectable 40 milliGRAM(s) IV Push every 12 hours    MEDICATIONS  (PRN):      FAMILY HISTORY:  Family history of early CAD (Father, Mother)      SOCIAL HISTORY: he denies any history of smoking or any alcohol consumption.    REVIEW OF SYSTEM:  Pertinent items are noted in HPI.  Constitutional	Negative for chills, fevers, sweats.    Eyes: 	Negative for visual disturbance.  Ears, nose, mouth, throat, and face: Negative for epistaxis, nasal congestion, sore throat   Neck:	Negative for enlargement, pain and difficulty in swallowing  Respiration : Negative for cough,  pleuritic chest pain and wheezing  Cardiovascular: Negative for chest pain,  and palpitations    Gastrointestinal : Negative for abdominal pain, diarrhea, nausea and vomiting  Genitourinary: Negative for dysuria, frequency and urinary incontinence .  Skin: Negative for  rash, pruritus, swelling, dryness .  	  Hematologic/lymphatic: Negative for bleeding and easy bruising  Musculoskeletal: Negative for arthralgias, back pain and muscle weakness.  Neurological: Negative for dizziness, headaches, seizures and tremors  Behavioral/Psych: Negative for mood change, depression.  Endocrine:	Negative for blurry vision, polydipsia and polyuria, diaphoresis.   Allergic/Immunologic:	Negative for anaphylaxis, angioedema and urticaria.      Vital Signs Last 24 Hrs  T(C): 36.4 (05 Apr 2018 04:26), Max: 36.7 (04 Apr 2018 17:37)  T(F): 97.5 (05 Apr 2018 04:26), Max: 98.1 (04 Apr 2018 17:37)  HR: 99 (05 Apr 2018 04:26) (85 - 99)  BP: 157/78 (05 Apr 2018 04:26) (125/66 - 157/78)  BP(mean): --  RR: 18 (05 Apr 2018 04:26) (18 - 18)  SpO2: 93% (05 Apr 2018 04:26) (93% - 99%)    I&O's Summary    04 Apr 2018 07:01  -  05 Apr 2018 07:00  --------------------------------------------------------  IN: 129 mL / OUT: 500 mL / NET: -371 mL      PHYSICAL EXAM  General Appearance: cooperative, no acute distress,   HEENT: PERRL, conjunctiva clear, EOM's intact, non injected pharynx, no exudate, TM   normal  Neck: Supple, , no adenopathy, thyroid: not enlarged, no carotid bruit or JVD  Back: Symmetric, no  tenderness,no soft tissue tenderness  Lungs: Clear to auscultation bilateral,no adventitious breath sounds, normal   expiratory phase  Heart: Regular rate and rhythm, S1, S2 normal, no murmur, rub or gallop  Abdomen: Soft, non-tender, bowel sounds active , no hepatosplenomegaly  Extremities: no cyanosis or edema, no joint swelling  Skin: Skin color, texture normal, no rashes   Neurologic: Alert and oriented X3 , cranial nerves intact, sensory and motor normal,          ECG: NSR LBBB        LABS:                          10.9   8.47  )-----------( 228      ( 05 Apr 2018 05:39 )             32.4     04-05    136  |  103  |  27<H>  ----------------------------<  100<H>  3.3<L>   |  22  |  2.15<H>    Ca    9.0      05 Apr 2018 05:39  Phos  2.0     04-05    TPro  x   /  Alb  2.8<L>  /  TBili  x   /  DBili  x   /  AST  x   /  ALT  x   /  AlkPhos  x   04-05            PT/INR - ( 05 Apr 2018 10:10 )   PT: 27.9 sec;   INR: 2.53 ratio         PTT - ( 03 Apr 2018 14:57 )  PTT:42.6 sec

## 2018-04-05 NOTE — CONSULT NOTE ADULT - ASSESSMENT
Anemia he feels better after transfusion.  Chronic systolic/diastolic congestive heart failure. He is stable  Paroxysmal atrial fibrillation. He is now in normal sinus rhythm.  Small to moderate pericardial effusion .  Coronary artery disease status post multiple stents January 2018.  Aortic abdominal aneurysm status post endovascular repair.  Hypertension.  End-stage renal disease he is on hemodialysis.  Renal and bladder cancer in the past status post surgery.  Suggest  Continue with current cardiac medications   .  Adjust INR 2   Start Norvasc 2.5milligram by mouth once daily.  Due to paroxysmal atrial fibrillation patient should have been on amiodarone 100 mg by mouth once daily.  Follow-up with electrolytes.  Repeat echo o evaluate pericardial effusion.  Follow up with H/H.  Follow low sodium low cholesterol diet.  Gradusl ambulation.  Discussed with the hospitalist .  Discussed with patient's daughter.

## 2018-04-09 DIAGNOSIS — E78.5 HYPERLIPIDEMIA, UNSPECIFIED: ICD-10-CM

## 2018-04-09 DIAGNOSIS — Z99.2 DEPENDENCE ON RENAL DIALYSIS: ICD-10-CM

## 2018-04-09 DIAGNOSIS — Z85.528 PERSONAL HISTORY OF OTHER MALIGNANT NEOPLASM OF KIDNEY: ICD-10-CM

## 2018-04-09 DIAGNOSIS — Z79.02 LONG TERM (CURRENT) USE OF ANTITHROMBOTICS/ANTIPLATELETS: ICD-10-CM

## 2018-04-09 DIAGNOSIS — I13.2 HYPERTENSIVE HEART AND CHRONIC KIDNEY DISEASE WITH HEART FAILURE AND WITH STAGE 5 CHRONIC KIDNEY DISEASE, OR END STAGE RENAL DISEASE: ICD-10-CM

## 2018-04-09 DIAGNOSIS — D75.82 HEPARIN INDUCED THROMBOCYTOPENIA (HIT): ICD-10-CM

## 2018-04-09 DIAGNOSIS — I48.0 PAROXYSMAL ATRIAL FIBRILLATION: ICD-10-CM

## 2018-04-09 DIAGNOSIS — D64.9 ANEMIA, UNSPECIFIED: ICD-10-CM

## 2018-04-09 DIAGNOSIS — Z85.51 PERSONAL HISTORY OF MALIGNANT NEOPLASM OF BLADDER: ICD-10-CM

## 2018-04-09 DIAGNOSIS — I25.10 ATHEROSCLEROTIC HEART DISEASE OF NATIVE CORONARY ARTERY WITHOUT ANGINA PECTORIS: ICD-10-CM

## 2018-04-09 DIAGNOSIS — D62 ACUTE POSTHEMORRHAGIC ANEMIA: ICD-10-CM

## 2018-04-09 DIAGNOSIS — Z79.01 LONG TERM (CURRENT) USE OF ANTICOAGULANTS: ICD-10-CM

## 2018-04-09 DIAGNOSIS — I31.3 PERICARDIAL EFFUSION (NONINFLAMMATORY): ICD-10-CM

## 2018-04-09 DIAGNOSIS — Z79.82 LONG TERM (CURRENT) USE OF ASPIRIN: ICD-10-CM

## 2018-04-09 DIAGNOSIS — Z88.0 ALLERGY STATUS TO PENICILLIN: ICD-10-CM

## 2018-04-09 DIAGNOSIS — Z95.5 PRESENCE OF CORONARY ANGIOPLASTY IMPLANT AND GRAFT: ICD-10-CM

## 2018-04-09 DIAGNOSIS — I50.42 CHRONIC COMBINED SYSTOLIC (CONGESTIVE) AND DIASTOLIC (CONGESTIVE) HEART FAILURE: ICD-10-CM

## 2018-04-09 DIAGNOSIS — Z90.5 ACQUIRED ABSENCE OF KIDNEY: ICD-10-CM

## 2018-04-09 DIAGNOSIS — N18.6 END STAGE RENAL DISEASE: ICD-10-CM

## 2018-04-23 ENCOUNTER — APPOINTMENT (OUTPATIENT)
Dept: CARDIOLOGY | Facility: CLINIC | Age: 78
End: 2018-04-23
Payer: MEDICARE

## 2018-04-23 ENCOUNTER — NON-APPOINTMENT (OUTPATIENT)
Age: 78
End: 2018-04-23

## 2018-04-23 VITALS
SYSTOLIC BLOOD PRESSURE: 131 MMHG | HEIGHT: 67.5 IN | DIASTOLIC BLOOD PRESSURE: 71 MMHG | WEIGHT: 135 LBS | HEART RATE: 82 BPM | OXYGEN SATURATION: 98 % | BODY MASS INDEX: 20.94 KG/M2

## 2018-04-23 LAB — INR PPP: 1.4

## 2018-04-23 PROCEDURE — 99215 OFFICE O/P EST HI 40 MIN: CPT

## 2018-04-23 PROCEDURE — 93000 ELECTROCARDIOGRAM COMPLETE: CPT

## 2018-04-23 NOTE — PROGRESS NOTE ADULT - PROBLEM SELECTOR PLAN 4
In setting of renal failure: Serum phosphorus is 5.3 today. C/w renagel 1600 mg TID with meals. Monitor serum phosphorus level. Not applicable

## 2018-04-27 LAB — INR PPP: 1.71

## 2018-05-01 ENCOUNTER — APPOINTMENT (OUTPATIENT)
Dept: UROLOGY | Facility: CLINIC | Age: 78
End: 2018-05-01

## 2018-05-03 ENCOUNTER — RESULT REVIEW (OUTPATIENT)
Age: 78
End: 2018-05-03

## 2018-05-04 LAB
INR PPP: 2.5
PT BLD: 31.3

## 2018-05-16 ENCOUNTER — RESULT REVIEW (OUTPATIENT)
Age: 78
End: 2018-05-16

## 2018-05-17 LAB
INR PPP: 2.2
PT BLD: 27.8

## 2018-05-24 LAB — INR PPP: 2.15

## 2018-05-30 ENCOUNTER — APPOINTMENT (OUTPATIENT)
Dept: CARDIOLOGY | Facility: CLINIC | Age: 78
End: 2018-05-30
Payer: MEDICARE

## 2018-05-30 ENCOUNTER — NON-APPOINTMENT (OUTPATIENT)
Age: 78
End: 2018-05-30

## 2018-05-30 VITALS — SYSTOLIC BLOOD PRESSURE: 122 MMHG | DIASTOLIC BLOOD PRESSURE: 72 MMHG

## 2018-05-30 VITALS
BODY MASS INDEX: 20.83 KG/M2 | WEIGHT: 135 LBS | OXYGEN SATURATION: 97 % | HEART RATE: 86 BPM | SYSTOLIC BLOOD PRESSURE: 150 MMHG | DIASTOLIC BLOOD PRESSURE: 73 MMHG

## 2018-05-30 DIAGNOSIS — Z86.2 PERSONAL HISTORY OF DISEASES OF THE BLOOD AND BLOOD-FORMING ORGANS AND CERTAIN DISORDERS INVOLVING THE IMMUNE MECHANISM: ICD-10-CM

## 2018-05-30 LAB — INR PPP: 2.5 RATIO

## 2018-05-30 PROCEDURE — 93000 ELECTROCARDIOGRAM COMPLETE: CPT

## 2018-05-30 PROCEDURE — 99215 OFFICE O/P EST HI 40 MIN: CPT

## 2018-05-30 RX ORDER — VALSARTAN 40 MG/1
40 TABLET, COATED ORAL
Qty: 90 | Refills: 1 | Status: DISCONTINUED | COMMUNITY
Start: 2018-04-23 | End: 2018-05-30

## 2018-05-30 RX ORDER — AMLODIPINE BESYLATE 2.5 MG/1
2.5 TABLET ORAL DAILY
Qty: 90 | Refills: 3 | Status: DISCONTINUED | COMMUNITY
Start: 2018-04-23 | End: 2018-05-30

## 2018-05-30 RX ORDER — WARFARIN 2.5 MG/1
2.5 TABLET ORAL
Qty: 90 | Refills: 0 | Status: DISCONTINUED | COMMUNITY
Start: 2018-04-23 | End: 2018-05-30

## 2018-06-01 ENCOUNTER — APPOINTMENT (OUTPATIENT)
Dept: CARDIOLOGY | Facility: CLINIC | Age: 78
End: 2018-06-01
Payer: MEDICARE

## 2018-06-01 LAB — INR PPP: 1.7 RATIO

## 2018-06-01 PROCEDURE — 99211 OFF/OP EST MAY X REQ PHY/QHP: CPT

## 2018-06-01 PROCEDURE — 85610 PROTHROMBIN TIME: CPT | Mod: QW

## 2018-06-15 ENCOUNTER — LABORATORY RESULT (OUTPATIENT)
Age: 78
End: 2018-06-15

## 2018-07-17 PROBLEM — I71.4 ABDOMINAL AORTIC ANEURYSM, WITHOUT RUPTURE: Chronic | Status: ACTIVE | Noted: 2018-01-22

## 2018-07-25 ENCOUNTER — NON-APPOINTMENT (OUTPATIENT)
Age: 78
End: 2018-07-25

## 2018-07-25 ENCOUNTER — APPOINTMENT (OUTPATIENT)
Dept: CARDIOLOGY | Facility: CLINIC | Age: 78
End: 2018-07-25
Payer: MEDICARE

## 2018-07-25 VITALS
BODY MASS INDEX: 20.68 KG/M2 | DIASTOLIC BLOOD PRESSURE: 70 MMHG | OXYGEN SATURATION: 98 % | WEIGHT: 134 LBS | SYSTOLIC BLOOD PRESSURE: 132 MMHG | HEART RATE: 73 BPM

## 2018-07-25 DIAGNOSIS — Z78.9 OTHER SPECIFIED HEALTH STATUS: ICD-10-CM

## 2018-07-25 PROBLEM — N28.89 OTHER SPECIFIED DISORDERS OF KIDNEY AND URETER: Chronic | Status: ACTIVE | Noted: 2018-01-22

## 2018-07-25 PROBLEM — N40.0 BENIGN PROSTATIC HYPERPLASIA WITHOUT LOWER URINARY TRACT SYMPTOMS: Chronic | Status: ACTIVE | Noted: 2018-01-22

## 2018-07-25 PROCEDURE — 93000 ELECTROCARDIOGRAM COMPLETE: CPT

## 2018-07-25 PROCEDURE — 99215 OFFICE O/P EST HI 40 MIN: CPT

## 2018-07-25 RX ORDER — FUROSEMIDE 20 MG/1
20 TABLET ORAL DAILY
Qty: 90 | Refills: 2 | Status: DISCONTINUED | COMMUNITY
Start: 2018-04-23 | End: 2018-07-25

## 2018-07-30 RX ORDER — LISINOPRIL 2.5 MG/1
1 TABLET ORAL
Qty: 0 | Refills: 0 | COMMUNITY

## 2018-07-30 RX ORDER — METOPROLOL TARTRATE 50 MG
1 TABLET ORAL
Qty: 0 | Refills: 0 | COMMUNITY

## 2018-07-30 RX ORDER — SENNA PLUS 8.6 MG/1
1 TABLET ORAL
Qty: 0 | Refills: 0 | COMMUNITY

## 2018-07-30 RX ORDER — LANOLIN ALCOHOL/MO/W.PET/CERES
1 CREAM (GRAM) TOPICAL
Qty: 0 | Refills: 0 | COMMUNITY

## 2018-07-30 RX ORDER — LATANOPROST 0.05 MG/ML
1 SOLUTION/ DROPS OPHTHALMIC; TOPICAL
Qty: 0 | Refills: 0 | COMMUNITY

## 2018-07-30 RX ORDER — AMIODARONE HYDROCHLORIDE 400 MG/1
1 TABLET ORAL
Qty: 0 | Refills: 0 | COMMUNITY

## 2018-07-30 RX ORDER — METOPROLOL TARTRATE 50 MG
0.5 TABLET ORAL
Qty: 0 | Refills: 0 | COMMUNITY

## 2018-07-30 RX ORDER — ASPIRIN/CALCIUM CARB/MAGNESIUM 324 MG
1 TABLET ORAL
Qty: 0 | Refills: 0 | COMMUNITY

## 2018-07-30 RX ORDER — CLOPIDOGREL BISULFATE 75 MG/1
1 TABLET, FILM COATED ORAL
Qty: 0 | Refills: 0 | COMMUNITY

## 2018-07-30 RX ORDER — WARFARIN SODIUM 2.5 MG/1
0 TABLET ORAL
Qty: 0 | Refills: 0 | COMMUNITY

## 2018-07-30 RX ORDER — AMLODIPINE BESYLATE 2.5 MG/1
1 TABLET ORAL
Qty: 0 | Refills: 0 | COMMUNITY

## 2018-07-30 RX ORDER — SEVELAMER CARBONATE 2400 MG/1
1 POWDER, FOR SUSPENSION ORAL
Qty: 0 | Refills: 0 | COMMUNITY

## 2018-07-30 RX ORDER — LIDOCAINE 4 G/100G
1 CREAM TOPICAL
Qty: 0 | Refills: 0 | COMMUNITY

## 2018-07-30 RX ORDER — ATORVASTATIN CALCIUM 80 MG/1
1 TABLET, FILM COATED ORAL
Qty: 0 | Refills: 0 | COMMUNITY

## 2018-07-30 RX ORDER — WARFARIN SODIUM 2.5 MG/1
1 TABLET ORAL
Qty: 0 | Refills: 0 | COMMUNITY

## 2018-07-30 RX ORDER — SEVELAMER CARBONATE 2400 MG/1
2 POWDER, FOR SUSPENSION ORAL
Qty: 0 | Refills: 0 | COMMUNITY

## 2018-10-17 ENCOUNTER — APPOINTMENT (OUTPATIENT)
Dept: CARDIOLOGY | Facility: CLINIC | Age: 78
End: 2018-10-17
Payer: MEDICARE

## 2018-10-17 ENCOUNTER — NON-APPOINTMENT (OUTPATIENT)
Age: 78
End: 2018-10-17

## 2018-10-17 VITALS
BODY MASS INDEX: 20.79 KG/M2 | HEART RATE: 89 BPM | OXYGEN SATURATION: 98 % | WEIGHT: 134 LBS | DIASTOLIC BLOOD PRESSURE: 76 MMHG | TEMPERATURE: 98.4 F | SYSTOLIC BLOOD PRESSURE: 125 MMHG | HEIGHT: 67.5 IN

## 2018-10-17 DIAGNOSIS — G93.9 DISORDER OF BRAIN, UNSPECIFIED: ICD-10-CM

## 2018-10-17 PROBLEM — I71.4 ABDOMINAL AORTIC ANEURYSM, WITHOUT RUPTURE: Chronic | Status: ACTIVE | Noted: 2018-04-03

## 2018-10-17 PROBLEM — D75.82 HEPARIN INDUCED THROMBOCYTOPENIA (HIT): Chronic | Status: ACTIVE | Noted: 2018-04-03

## 2018-10-17 PROBLEM — I50.9 HEART FAILURE, UNSPECIFIED: Chronic | Status: ACTIVE | Noted: 2018-04-03

## 2018-10-17 PROBLEM — I25.10 ATHEROSCLEROTIC HEART DISEASE OF NATIVE CORONARY ARTERY WITHOUT ANGINA PECTORIS: Chronic | Status: ACTIVE | Noted: 2018-02-25

## 2018-10-17 PROBLEM — N17.9 ACUTE KIDNEY FAILURE, UNSPECIFIED: Chronic | Status: ACTIVE | Noted: 2018-04-03

## 2018-10-17 PROBLEM — C67.9 MALIGNANT NEOPLASM OF BLADDER, UNSPECIFIED: Chronic | Status: ACTIVE | Noted: 2018-04-03

## 2018-10-17 PROCEDURE — 93000 ELECTROCARDIOGRAM COMPLETE: CPT

## 2018-10-17 PROCEDURE — 99215 OFFICE O/P EST HI 40 MIN: CPT

## 2018-10-17 PROCEDURE — 36415 COLL VENOUS BLD VENIPUNCTURE: CPT

## 2018-10-17 RX ORDER — PANTOPRAZOLE 40 MG/1
40 TABLET, DELAYED RELEASE ORAL
Qty: 90 | Refills: 0 | Status: DISCONTINUED | COMMUNITY
Start: 2018-04-23 | End: 2018-10-17

## 2018-10-19 LAB
25(OH)D3 SERPL-MCNC: 85.6 NG/ML
ALBUMIN SERPL ELPH-MCNC: 4.6 G/DL
ALP BLD-CCNC: 98 U/L
ALT SERPL-CCNC: 13 U/L
ANION GAP SERPL CALC-SCNC: 14 MMOL/L
AST SERPL-CCNC: 12 U/L
BASOPHILS # BLD AUTO: 0.04 K/UL
BASOPHILS NFR BLD AUTO: 0.4 %
BILIRUB SERPL-MCNC: 0.8 MG/DL
BUN SERPL-MCNC: 28 MG/DL
CALCIUM SERPL-MCNC: 10.2 MG/DL
CHLORIDE SERPL-SCNC: 101 MMOL/L
CHOLEST SERPL-MCNC: 109 MG/DL
CHOLEST/HDLC SERPL: 2.8 RATIO
CO2 SERPL-SCNC: 26 MMOL/L
CREAT SERPL-MCNC: 1.28 MG/DL
EOSINOPHIL # BLD AUTO: 0.04 K/UL
EOSINOPHIL NFR BLD AUTO: 0.4 %
GLUCOSE SERPL-MCNC: 88 MG/DL
HBA1C MFR BLD HPLC: 5.7 %
HCT VFR BLD CALC: 39.2 %
HDLC SERPL-MCNC: 39 MG/DL
HGB BLD-MCNC: 12.7 G/DL
IMM GRANULOCYTES NFR BLD AUTO: 0.3 %
LDLC SERPL CALC-MCNC: 45 MG/DL
LYMPHOCYTES # BLD AUTO: 1.63 K/UL
LYMPHOCYTES NFR BLD AUTO: 17.5 %
MAN DIFF?: NORMAL
MCHC RBC-ENTMCNC: 31.1 PG
MCHC RBC-ENTMCNC: 32.4 GM/DL
MCV RBC AUTO: 96.1 FL
MONOCYTES # BLD AUTO: 0.79 K/UL
MONOCYTES NFR BLD AUTO: 8.5 %
NEUTROPHILS # BLD AUTO: 6.77 K/UL
NEUTROPHILS NFR BLD AUTO: 72.9 %
PLATELET # BLD AUTO: 231 K/UL
POTASSIUM SERPL-SCNC: 5 MMOL/L
PROT SERPL-MCNC: 7.4 G/DL
RBC # BLD: 4.08 M/UL
RBC # FLD: 13.1 %
SODIUM SERPL-SCNC: 141 MMOL/L
TRIGL SERPL-MCNC: 127 MG/DL
TSH SERPL-ACNC: 4.01 UIU/ML
WBC # FLD AUTO: 9.3 K/UL

## 2018-11-12 ENCOUNTER — MEDICATION RENEWAL (OUTPATIENT)
Age: 78
End: 2018-11-12

## 2018-12-17 ENCOUNTER — TRANSCRIPTION ENCOUNTER (OUTPATIENT)
Age: 78
End: 2018-12-17

## 2019-01-15 ENCOUNTER — LABORATORY RESULT (OUTPATIENT)
Age: 79
End: 2019-01-15

## 2019-01-16 ENCOUNTER — NON-APPOINTMENT (OUTPATIENT)
Age: 79
End: 2019-01-16

## 2019-01-16 ENCOUNTER — APPOINTMENT (OUTPATIENT)
Dept: CARDIOLOGY | Facility: CLINIC | Age: 79
End: 2019-01-16
Payer: MEDICARE

## 2019-01-16 VITALS — DIASTOLIC BLOOD PRESSURE: 60 MMHG | SYSTOLIC BLOOD PRESSURE: 120 MMHG

## 2019-01-16 VITALS
OXYGEN SATURATION: 96 % | HEIGHT: 67.5 IN | WEIGHT: 141 LBS | SYSTOLIC BLOOD PRESSURE: 157 MMHG | HEART RATE: 79 BPM | DIASTOLIC BLOOD PRESSURE: 74 MMHG | BODY MASS INDEX: 21.87 KG/M2

## 2019-01-16 PROCEDURE — 36415 COLL VENOUS BLD VENIPUNCTURE: CPT

## 2019-01-16 PROCEDURE — 93000 ELECTROCARDIOGRAM COMPLETE: CPT

## 2019-01-16 PROCEDURE — 99215 OFFICE O/P EST HI 40 MIN: CPT

## 2019-01-16 RX ORDER — CARVEDILOL 12.5 MG/1
12.5 TABLET, FILM COATED ORAL TWICE DAILY
Qty: 60 | Refills: 5 | Status: DISCONTINUED | COMMUNITY
Start: 2018-10-17 | End: 2019-01-16

## 2019-01-16 NOTE — CARDIOLOGY SUMMARY
[No Ischemia] : no Ischemia [___] : [unfilled] [LVEF ___%] : LVEF [unfilled]% [Severe] : severe LV dysfunction [Normal] : normal LA size

## 2019-01-16 NOTE — HISTORY OF PRESENT ILLNESS
[FreeTextEntry1] : Patient is a 78 year-old gentleman with known history of renal cell carcinoma status post left nephrectomy, hypertension, dyslipidemia, AAA status-post EVAR on 1/29/2018 by Wyatt Young MD complicated by right renal artery occlusion leading to acute kidney injury requiring dialysis. Patient presented to [facility] as transfer from [facility] on 2/6/2018. At the time of transfer, patient was seen to be volume overloaded in the setting of acute decompensated systolic heart failure in the setting of acute on chronic renal insufficiency. The right renal artery could not be revascularized. Patient was also seen to have multivessel coronary artery disease and is status post PCI with RICHARDSON x2 to LAD, RICHARDSON x1 to OM1, and POBA to D1 on 2/18/2018. Hospital course was complicated by paroxysmal atrial fibrillation and heparin induced thrombocytopenia (HIT).\par \par Since discharge, patient had his right renal artery revascularized by Pradip Cano MD at [facility]. Since the procedure, his urine output has normalized as has his creatinine. Dialysis was discontinued and his dialysis catheter was removed.\par \par He has been experiencing dizziness, and his labs reveal anemia with hemoglobin 9.05 g/dL on 5/9/2018. Creatinine is stable at 1.31 mg/dL. He has elected not to start ARB. He continues to take clopidogrel, warfarin, and metoprolol. He recently discontinued his amlodipine because of dizziness.\par \par He walks several miles per day without symptoms. He had been having orthopnea prior to his visit with me in April 2018, but now he sleeps comfortably with two pillows.\par \par Patient is concerned about recent hair loss. He believes it is either his metoprolol or his warfarin, and he has been on the metoprolol for years. This has improved since changing to apixaban.\par \par PMD: Asael Dang MD [phone]\par Cardiologist: Vladimir Jones MD [phone]\par Nephrologist: Ellis Stevens MD [phone]\par Vascular Surgeon: Pradip Cano MD at [facility] [phone]\par \par

## 2019-01-16 NOTE — REASON FOR VISIT
[Follow-Up - From Hospitalization] : follow-up of a recent hospitalization for [Coronary Artery Disease] : coronary artery disease [Peripheral Vascular Disease] : peripheral vascular disease [Discharge Date: ___] : Discharge Date: [unfilled] [Admitted for Heart Failure] : patient was admitted for heart failure [Other: _____] : [unfilled] [FreeTextEntry1] : I met patient in the CCU during his hospitalization in February 2018.\par In March 2018, patient underwent revascularization of his right renal artery. Patient was discharged from [facility] on March 25, 2018.\par April 3rd, 2018 patient was noted to have symptomatic anemia and was admitted to [facility] for transfusion. He was discharged home on April 5, 2018.

## 2019-01-16 NOTE — PHYSICAL EXAM
[General Appearance - Well Developed] : well developed [Normal Appearance] : normal appearance [Well Groomed] : well groomed [General Appearance - Well Nourished] : well nourished [No Deformities] : no deformities [General Appearance - In No Acute Distress] : no acute distress [Normal Oral Mucosa] : normal oral mucosa [No Oral Pallor] : no oral pallor [No Oral Cyanosis] : no oral cyanosis [Normal Oropharynx] : normal oropharynx [Normal Jugular Venous A Waves Present] : normal jugular venous A waves present [Normal Jugular Venous V Waves Present] : normal jugular venous V waves present [No Jugular Venous Malhotra A Waves] : no jugular venous malhotra A waves [] : no respiratory distress [Respiration, Rhythm And Depth] : normal respiratory rhythm and effort [Exaggerated Use Of Accessory Muscles For Inspiration] : no accessory muscle use [Auscultation Breath Sounds / Voice Sounds] : lungs were clear to auscultation bilaterally [Bowel Sounds] : normal bowel sounds [Abdomen Soft] : soft [Abdomen Tenderness] : non-tender [Abnormal Walk] : normal gait [Gait - Sufficient For Exercise Testing] : the gait was sufficient for exercise testing [Nail Clubbing] : no clubbing of the fingernails [Cyanosis, Localized] : no localized cyanosis [Skin Color & Pigmentation] : normal skin color and pigmentation [No Venous Stasis] : no venous stasis [No Xanthoma] : no  xanthoma was observed [Oriented To Time, Place, And Person] : oriented to person, place, and time [Impaired Insight] : insight and judgment were intact [Affect] : the affect was normal [Mood] : the mood was normal [No Anxiety] : not feeling anxious [Conjunctiva] : the conjunctiva were normal in both eyes [PERRL] : pupils were equal in size, round, and reactive to light [EOM Intact] : extraocular movements were intact [5th Left ICS - MCL] : palpated at the 5th LICS in the midclavicular line [Normal] : normal [No Precordial Heave] : no precordial heave was noted [Normal Rate] : normal [Rhythm Regular] : regular [Normal S1] : normal S1 [Normal S2] : normal S2 [No Gallop] : no gallop heard [III] : a grade 3 [2+] : left 2+ [No Pitting Edema] : no pitting edema present [Yellow Sclera (Icteric)] : no scleral icterus was seen

## 2019-01-16 NOTE — DISCUSSION/SUMMARY
[FreeTextEntry1] : Patient is a very pleasant 78 year-old gentleman with history as above including RCC status post nephrectomy, AAA status post EVAR complicated by occlusion of remaining renal artery, initiation of HD, acute decompensated systolic heart failure secondary to ischemic cardiomyopathy, now status post PCI and subsequent revascularization of renal artery with interval improvement in renal function.\par \par Continue metoprolol succinate 100mg daily for guideline based heart failure management for patient with reduced LVEF. We considered low dose ARB for heart failure management, and Dr. Stevens agreed, but patient and his daughter, Jennifer decided against it.\par \par Patient's CHADSVASc = 5.\par Patient started on apixaban 5mg BID for CVA prevention. His hair has stopped falling out and is growing back. Patient has been taking apixaban 2.5mg daily (per patient's daughter, Jennifer's instructions). We discussed BID dosing, but patient's daughter refuses. They are not interested in Xarelto either.\par Will send long term event monitor to evaluate for PAF.\par Could encourage AliveCor Kardia device or Apple Watch as alternatives for monitoring for AFib if anticoagulation is ultimately held.\par \par Follow-up with Dr. Stevens as scheduled later this week.\par Encourage adequate hydration.\par \par Referred to neurologist for evaluation of short term memory deficits and dizziness, but patient does not want to see a neurologist.

## 2019-01-18 LAB
25(OH)D3 SERPL-MCNC: 73.7 NG/ML
ALBUMIN SERPL ELPH-MCNC: 4.7 G/DL
ALP BLD-CCNC: 82 U/L
ALT SERPL-CCNC: 12 U/L
ANION GAP SERPL CALC-SCNC: 15 MMOL/L
AST SERPL-CCNC: 16 U/L
BILIRUB SERPL-MCNC: 0.6 MG/DL
BUN SERPL-MCNC: 31 MG/DL
CALCIUM SERPL-MCNC: 10.2 MG/DL
CHLORIDE SERPL-SCNC: 102 MMOL/L
CHOLEST SERPL-MCNC: 160 MG/DL
CHOLEST/HDLC SERPL: 3.4 RATIO
CO2 SERPL-SCNC: 23 MMOL/L
CREAT SERPL-MCNC: 1.56 MG/DL
GLUCOSE SERPL-MCNC: 85 MG/DL
HDLC SERPL-MCNC: 47 MG/DL
LDLC SERPL CALC-MCNC: 83 MG/DL
POTASSIUM SERPL-SCNC: 5.2 MMOL/L
PROT SERPL-MCNC: 8.1 G/DL
SODIUM SERPL-SCNC: 140 MMOL/L
TRIGL SERPL-MCNC: 150 MG/DL
TSH SERPL-ACNC: 6.17 UIU/ML

## 2019-01-27 ENCOUNTER — FORM ENCOUNTER (OUTPATIENT)
Age: 79
End: 2019-01-27

## 2019-04-17 ENCOUNTER — NON-APPOINTMENT (OUTPATIENT)
Age: 79
End: 2019-04-17

## 2019-04-17 ENCOUNTER — APPOINTMENT (OUTPATIENT)
Dept: CARDIOLOGY | Facility: CLINIC | Age: 79
End: 2019-04-17
Payer: MEDICARE

## 2019-04-17 ENCOUNTER — LABORATORY RESULT (OUTPATIENT)
Age: 79
End: 2019-04-17

## 2019-04-17 VITALS
WEIGHT: 150 LBS | SYSTOLIC BLOOD PRESSURE: 165 MMHG | TEMPERATURE: 97.9 F | BODY MASS INDEX: 23.54 KG/M2 | HEIGHT: 67 IN | DIASTOLIC BLOOD PRESSURE: 80 MMHG | OXYGEN SATURATION: 95 % | HEART RATE: 84 BPM

## 2019-04-17 DIAGNOSIS — R42 DIZZINESS AND GIDDINESS: ICD-10-CM

## 2019-04-17 PROCEDURE — 99215 OFFICE O/P EST HI 40 MIN: CPT

## 2019-04-17 PROCEDURE — 93000 ELECTROCARDIOGRAM COMPLETE: CPT

## 2019-04-17 PROCEDURE — 36415 COLL VENOUS BLD VENIPUNCTURE: CPT

## 2019-04-17 NOTE — DISCUSSION/SUMMARY
[FreeTextEntry1] : Patient is a very pleasant 78 year-old gentleman with history as above including RCC status post nephrectomy, AAA status post EVAR complicated by occlusion of remaining renal artery, initiation of HD, acute decompensated systolic heart failure secondary to ischemic cardiomyopathy, now status post PCI and subsequent revascularization of renal artery with interval improvement in renal function.\par Patient is maintaining sinus rhythm, and MCOT x3 weeks was negative for atrial fibrillation.\par \par Continue metoprolol succinate 100mg daily for guideline based heart failure management for patient with reduced LVEF. We considered low dose ARB for heart failure management, and Dr. Stevens agreed, but patient and his daughter, Jennifer decided against it.\par \par Patient's CHADSVASc = 5.\par Patient started on apixaban 5 mg BID for CVA prevention. His hair has stopped falling out and is growing back. Patient has been taking apixaban 2.5 mg daily (per patient's daughter, Jennifer's instructions). We discussed BID dosing, but patient's daughter refuses. They are not interested in Xarelto either.\par Encourage AliveCor Kardia device or Apple Watch as alternatives for monitoring for AFib if anticoagulation is ultimately held.\par \par Follow-up with Dr. Stevens as scheduled later this week.\par Encourage adequate hydration.\par \par Referred to neurologist for evaluation of short term memory deficits and dizziness, but patient does not want to see a neurologist.

## 2019-04-17 NOTE — REASON FOR VISIT
[Follow-Up - From Hospitalization] : follow-up of a recent hospitalization for [Coronary Artery Disease] : coronary artery disease [Peripheral Vascular Disease] : peripheral vascular disease [Discharge Date: ___] : Discharge Date: [unfilled] [Admitted for Heart Failure] : patient was admitted for heart failure [Other: _____] : [unfilled] [FreeTextEntry1] : I met patient in the CCU during his hospitalization in February 2018.\par In March 2018, patient underwent revascularization of his right renal artery. Patient was discharged from [facility] on March 25, 2018.\par April 3rd, 2018 patient was noted to have symptomatic anemia and was admitted to [facility] for transfusion. He was discharged home on April 5, 2018.

## 2019-04-17 NOTE — PHYSICAL EXAM
[Normal Appearance] : normal appearance [General Appearance - Well Developed] : well developed [Well Groomed] : well groomed [General Appearance - Well Nourished] : well nourished [No Deformities] : no deformities [General Appearance - In No Acute Distress] : no acute distress [Normal Oral Mucosa] : normal oral mucosa [Normal Oropharynx] : normal oropharynx [No Oral Cyanosis] : no oral cyanosis [No Oral Pallor] : no oral pallor [Normal Jugular Venous V Waves Present] : normal jugular venous V waves present [No Jugular Venous Malhotra A Waves] : no jugular venous malhotra A waves [Normal Jugular Venous A Waves Present] : normal jugular venous A waves present [Respiration, Rhythm And Depth] : normal respiratory rhythm and effort [] : no respiratory distress [Exaggerated Use Of Accessory Muscles For Inspiration] : no accessory muscle use [Bowel Sounds] : normal bowel sounds [Auscultation Breath Sounds / Voice Sounds] : lungs were clear to auscultation bilaterally [Abdomen Soft] : soft [Abnormal Walk] : normal gait [Abdomen Tenderness] : non-tender [Nail Clubbing] : no clubbing of the fingernails [Gait - Sufficient For Exercise Testing] : the gait was sufficient for exercise testing [No Venous Stasis] : no venous stasis [Cyanosis, Localized] : no localized cyanosis [Skin Color & Pigmentation] : normal skin color and pigmentation [No Xanthoma] : no  xanthoma was observed [Oriented To Time, Place, And Person] : oriented to person, place, and time [Mood] : the mood was normal [Impaired Insight] : insight and judgment were intact [Affect] : the affect was normal [Conjunctiva] : the conjunctiva were normal in both eyes [No Anxiety] : not feeling anxious [PERRL] : pupils were equal in size, round, and reactive to light [EOM Intact] : extraocular movements were intact [Normal] : normal [5th Left ICS - MCL] : palpated at the 5th LICS in the midclavicular line [No Precordial Heave] : no precordial heave was noted [Rhythm Regular] : regular [Normal Rate] : normal [Normal S1] : normal S1 [No Gallop] : no gallop heard [Normal S2] : normal S2 [III] : a grade 3 [2+] : left 2+ [No Pitting Edema] : no pitting edema present [Yellow Sclera (Icteric)] : no scleral icterus was seen

## 2019-04-17 NOTE — HISTORY OF PRESENT ILLNESS
[FreeTextEntry1] : Patient is a 78 year-old gentleman with known history of renal cell carcinoma status post left nephrectomy, hypertension, dyslipidemia, AAA status-post EVAR on 1/29/2018 by Wyatt Young MD complicated by right renal artery occlusion leading to acute kidney injury requiring dialysis. Patient presented to [facility] as transfer from [facility] on 2/6/2018. At the time of transfer, patient was seen to be volume overloaded in the setting of acute decompensated systolic heart failure in the setting of acute on chronic renal insufficiency. The right renal artery could not be revascularized. Patient was also seen to have multivessel coronary artery disease and is status post PCI with RICHARDSON x2 to LAD, RICHARDSON x1 to OM1, and POBA to D1 on 2/18/2018. 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 Since the procedure, his urine output has normalized as has his creatinine. Dialysis was discontinued and his dialysis catheter was removed.\par \par He has been experiencing dizziness, and his labs reveal anemia with hemoglobin 9.05 g/dL on 5/9/2018. Creatinine is stable at 1.31 mg/dL. He has elected not to start ARB. He continues to take clopidogrel, warfarin, and metoprolol. He recently discontinued his amlodipine because of dizziness.\par \par He walks several miles per day without symptoms. He had been having orthopnea prior to his visit with me in April 2018, but now he sleeps comfortably with two pillows.\par \par Patient is concerned about recent hair loss. He believes it is either his metoprolol or his warfarin, and he has been on the metoprolol for years. This has improved since changing to apixaban.\par \par April 2019 - Patient had MCOT that did not show atrial fibrillation over a 20 day period. He is here today in his usual state of health. He has been seen by his nephrologist and his vascular surgeon. The vascular surgeon mentioned that he may need to revise the stent because US showed compromise of the renal artery stent.\par \par PMD: Asael Dang MD [phone]\par Cardiologist: Vladimir Jones MD [phone]\par Nephrologist: Ellis Stevens MD [phone]\par Vascular Surgeon: Pradip Cano MD at [facility] [phone]

## 2019-04-18 LAB
ALBUMIN SERPL ELPH-MCNC: 4.8 G/DL
ALP BLD-CCNC: 82 U/L
ALT SERPL-CCNC: 11 U/L
ANION GAP SERPL CALC-SCNC: 12 MMOL/L
AST SERPL-CCNC: 13 U/L
BASOPHILS # BLD AUTO: 0.05 K/UL
BASOPHILS NFR BLD AUTO: 0.7 %
BILIRUB SERPL-MCNC: 0.5 MG/DL
BUN SERPL-MCNC: 29 MG/DL
CALCIUM SERPL-MCNC: 9.9 MG/DL
CHLORIDE SERPL-SCNC: 107 MMOL/L
CO2 SERPL-SCNC: 22 MMOL/L
CREAT SERPL-MCNC: 1.32 MG/DL
EOSINOPHIL # BLD AUTO: 0.11 K/UL
EOSINOPHIL NFR BLD AUTO: 1.4 %
GLUCOSE SERPL-MCNC: 86 MG/DL
HCT VFR BLD CALC: 41.5 %
HGB BLD-MCNC: 13.2 G/DL
IMM GRANULOCYTES NFR BLD AUTO: 0.5 %
LYMPHOCYTES # BLD AUTO: 1.65 K/UL
LYMPHOCYTES NFR BLD AUTO: 21.7 %
MAN DIFF?: NORMAL
MCHC RBC-ENTMCNC: 30.5 PG
MCHC RBC-ENTMCNC: 31.8 GM/DL
MCV RBC AUTO: 95.8 FL
MONOCYTES # BLD AUTO: 0.69 K/UL
MONOCYTES NFR BLD AUTO: 9.1 %
NEUTROPHILS # BLD AUTO: 5.07 K/UL
NEUTROPHILS NFR BLD AUTO: 66.6 %
PLATELET # BLD AUTO: 212 K/UL
POTASSIUM SERPL-SCNC: 4.8 MMOL/L
PROT SERPL-MCNC: 7.4 G/DL
RBC # BLD: 4.33 M/UL
RBC # FLD: 12.9 %
SODIUM SERPL-SCNC: 141 MMOL/L
TSH SERPL-ACNC: 6.65 UIU/ML
WBC # FLD AUTO: 7.61 K/UL

## 2019-05-07 ENCOUNTER — MEDICATION RENEWAL (OUTPATIENT)
Age: 79
End: 2019-05-07

## 2019-05-07 RX ORDER — ATORVASTATIN CALCIUM 40 MG/1
40 TABLET, FILM COATED ORAL
Qty: 90 | Refills: 1 | Status: DISCONTINUED | COMMUNITY
Start: 2018-04-23 | End: 2019-05-07

## 2019-06-25 ENCOUNTER — APPOINTMENT (OUTPATIENT)
Dept: GERIATRICS | Facility: CLINIC | Age: 79
End: 2019-06-25
Payer: MEDICARE

## 2019-06-25 VITALS
SYSTOLIC BLOOD PRESSURE: 130 MMHG | OXYGEN SATURATION: 96 % | TEMPERATURE: 98.6 F | BODY MASS INDEX: 24.48 KG/M2 | HEART RATE: 76 BPM | DIASTOLIC BLOOD PRESSURE: 70 MMHG | WEIGHT: 156 LBS | HEIGHT: 67 IN | RESPIRATION RATE: 15 BRPM

## 2019-06-25 PROCEDURE — 99204 OFFICE O/P NEW MOD 45 MIN: CPT

## 2019-06-25 NOTE — PHYSICAL EXAM
[General Appearance - Alert] : alert [General Appearance - In No Acute Distress] : in no acute distress [Sclera] : the sclera and conjunctiva were normal [Extraocular Movements] : extraocular movements were intact [No Oral Pallor] : no oral pallor [Normal Oral Mucosa] : normal oral mucosa [Neck Cervical Mass (___cm)] : no neck mass was observed [Neck Appearance] : the appearance of the neck was normal [Jugular Venous Distention Increased] : there was no jugular-venous distention [Apical Impulse] : the apical impulse was normal [Heart Sounds] : normal S1 and S2 [Murmurs] : no murmurs [Bowel Sounds] : normal bowel sounds [Abdomen Tenderness] : non-tender [Abdomen Soft] : soft [Total Score ___ / 30] : the patient achieved a score of [unfilled] /30 [No Focal Deficits] : no focal deficits [Date / Time ___ / 5] : date / time [unfilled] / 5 [Place ___ / 5] : place [unfilled] / 5 [Registration ___ / 3] : registration [unfilled] / 3 [Serial Sevens ___/5] : serial sevens [unfilled] / 5 [Repeating a Sentence ___ / 1] : repeating a sentence [unfilled] / 1 [Naming 2 Objects ___ / 2] : naming two objects [unfilled] / 2 [3-stage Verbal Command ___ / 3] : three-stage verbal command [unfilled] / 3 [Writing a Sentence ___ / 1] : write sentence [unfilled] / 1 [Written Command ___ / 1] : written command [unfilled] / 1 [Copy a Design ___ / 1] : copy a design [unfilled] / 1 [Recall ___ / 3] : recall [unfilled] / 3 [Oriented To Time, Place, And Person] : oriented to person, place, and time

## 2019-06-25 NOTE — HISTORY OF PRESENT ILLNESS
[FreeTextEntry1] : 79 yo M presents with sister for initial evaluation for memory problems\par \par Sister states that she has noted he repeats himself constantly.  She states that the pt forgets about the significance events - especially present events.  He had a prolonged hospital admission in january 2018 for kidney failure, CAD.  Sister feels memory problems preceded this admission but has been declining.\par CT head in 2/2018 showed chronic microvascular changes.\par \par Denies getting lost in familiar areas.  Denies auditory hallucinations.  Denies agitation.  Denies issues with finances.  Denies any falls in the last year.  Denies any head trauma\par \par PMHx:  Bladder ca s/p L nephrectomy \par HTN\par AAA - for which he had a repair\par CAD - had stents placed.  On plavix, statin, metoprolol.\par \par Family hx:\par Mother - alzheimers dz\par \par PCP is Dr. Dang in [place].\par Pt lives in [place] with his girlfriend.  He has two dtrs and son. \par He is independent in ADLs and iADLs.  Drives car - no accidents\par He is working as a

## 2019-06-25 NOTE — END OF VISIT
[] : Fellow [FreeTextEntry3] : The patient is a 77 yo male who presents for carlos consultation. main issue is 1 year of poor memory.  No head trauma.  Pt is functioning well.  He is independent in his adl. Funtioning well.  Still working as a . PMH signif for cad, AAA repair, renal cell ca sp nephrectomy.  CT of brin 1 year ago shows mod microvascular ischemic change. Mini mental is 27/30.  Presentation most cw min cog change likely secondary to microvasc dis.  Will continue plavix.  Pt advised to fu with pmd and return here approx 4 months for fu.

## 2019-06-25 NOTE — REVIEW OF SYSTEMS
[As Noted in HPI] : as noted in HPI [Negative] : Gastrointestinal [Anxiety] : no anxiety [Depression] : no depression

## 2019-07-15 ENCOUNTER — LABORATORY RESULT (OUTPATIENT)
Age: 79
End: 2019-07-15

## 2019-07-16 ENCOUNTER — NON-APPOINTMENT (OUTPATIENT)
Age: 79
End: 2019-07-16

## 2019-07-16 ENCOUNTER — APPOINTMENT (OUTPATIENT)
Dept: CARDIOLOGY | Facility: CLINIC | Age: 79
End: 2019-07-16
Payer: MEDICARE

## 2019-07-16 VITALS
WEIGHT: 156 LBS | SYSTOLIC BLOOD PRESSURE: 137 MMHG | OXYGEN SATURATION: 97 % | HEIGHT: 67 IN | BODY MASS INDEX: 24.48 KG/M2 | DIASTOLIC BLOOD PRESSURE: 71 MMHG | HEART RATE: 61 BPM

## 2019-07-16 PROCEDURE — 99215 OFFICE O/P EST HI 40 MIN: CPT

## 2019-07-16 PROCEDURE — 93000 ELECTROCARDIOGRAM COMPLETE: CPT

## 2019-07-16 PROCEDURE — 36415 COLL VENOUS BLD VENIPUNCTURE: CPT

## 2019-07-16 NOTE — REASON FOR VISIT
[Follow-Up - From Hospitalization] : follow-up of a recent hospitalization for [Coronary Artery Disease] : coronary artery disease [Peripheral Vascular Disease] : peripheral vascular disease [Discharge Date: ___] : Discharge Date: [unfilled] [Admitted for Heart Failure] : patient was admitted for heart failure [Other: _____] : [unfilled] [FreeTextEntry1] : I met patient in the CCU during his hospitalization in February 2018.\par In March 2018, patient underwent revascularization of his right renal artery. Patient was discharged from [facility] on March 25, 2018.\par April 3rd, 2018 patient was noted to have symptomatic anemia and was admitted to [facility] for transfusion. He was discharged home on April 5, 2018.

## 2019-07-16 NOTE — HISTORY OF PRESENT ILLNESS
[FreeTextEntry1] : Patient is a 78 year-old gentleman with known history of renal cell carcinoma status post left nephrectomy, hypertension, dyslipidemia, AAA status-post EVAR on 1/29/2018 by Wyatt Young MD complicated by right renal artery occlusion leading to acute kidney injury requiring dialysis. Patient presented to [facility] as transfer from [facility] on 2/6/2018. At the time of transfer, patient was seen to be volume overloaded in the setting of acute decompensated systolic heart failure in the setting of acute on chronic renal insufficiency. The right renal artery could not be revascularized. Patient was also seen to have multivessel coronary artery disease and is status post PCI with RICHARDSON x2 to LAD, RICHARDSON x1 to OM1, and POBA to D1 on 2/18/2018. 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 Since the procedure, his urine output has normalized as has his creatinine. Dialysis was discontinued and his dialysis catheter was removed.\par \par He has been experiencing dizziness, and his labs reveal anemia with hemoglobin 9.05 g/dL on 5/9/2018. Creatinine is stable at 1.31 mg/dL. He has elected not to start ARB. He continues to take clopidogrel, warfarin, and metoprolol. He recently discontinued his amlodipine because of dizziness.\par \par He walks several miles per day without symptoms. He had been having orthopnea prior to his visit with me in April 2018, but now he sleeps comfortably with two pillows.\par \par Patient is concerned about recent hair loss. He believes it is either his metoprolol or his warfarin, and he has been on the metoprolol for years. This has improved since changing to apixaban.\par \par April 2019 - Patient had MCOT that did not show atrial fibrillation over a 20 day period. He is here today in his usual state of health. He has been seen by his nephrologist and his vascular surgeon. The vascular surgeon mentioned that he may need to revise the stent because US showed compromise of the renal artery stent.\par \par PMD: Asael Dang MD [phone]\par Cardiologist: Vladimir Jones MD [phone]\par Nephrologist: Ellis Stevens MD [phone]\par Vascular Surgeon: Pradip Cano MD at [facility] [phone]

## 2019-07-16 NOTE — DISCUSSION/SUMMARY
Anesthesia ROS/Med Hx        Pulmonary Review:    Pt. positive for asthma    GI/HEPATIC/RENAL Review:    Pt. positive for GERD    Anesthesia Plan  ASA Status: 2  Anesthesia Type: MAC  Induction: Intravenous  Reviewed: Lab Results, Nursing Notes, Consultations, Beta Blocker Status, Allergies, Past Med History, Problem List, NPO Status, Patient Summary, DNR Status, Medications and Pre-Induction Reassessment  The proposed anesthetic plan, including its risks and benefits, have been discussed with the Patient - along with the risks and benefits of alternatives.  Questions were encouraged and answered and the patient and/or representative agrees to proceed.  Blood Products: Not Anticipated      Physical Exam  Mallampati: II  TM Distance: >3 FB  Neck ROM: Full  Cardio Rhythm: Regular  Cardio Rate: Normal  pulmonary exam normal  abdominal exam normal  dental exam normal      
[FreeTextEntry1] : Patient is a very pleasant 78 year-old gentleman with history as above including RCC status post nephrectomy, AAA status post EVAR complicated by occlusion of remaining renal artery, initiation of HD, acute decompensated systolic heart failure secondary to ischemic cardiomyopathy, now status post PCI and subsequent revascularization of renal artery with interval improvement in renal function.\par Patient is maintaining sinus rhythm, and MCOT x3 weeks was negative for atrial fibrillation.\par \par Continue metoprolol succinate 100mg daily for guideline based heart failure management for patient with reduced LVEF. We considered low dose ARB for heart failure management, and Dr. Stevens agreed, but patient and his daughter, Jennifer decided against it.\par \par Patient's CHADSVASc = 5.\par Patient started on apixaban 5 mg BID for CVA prevention. His hair has stopped falling out and is growing back. Patient has been taking apixaban 2.5 mg daily (per patient's daughter, Jennifer's instructions). We discussed BID dosing, but patient's daughter refuses. They are not interested in Xarelto either.\par Encourage AliveCor Kardia device or Apple Watch as alternatives for monitoring for AFib if anticoagulation is ultimately held.\par \par Follow-up with Dr. Stevens as scheduled later this week.\par Encourage adequate hydration.

## 2019-07-17 LAB
ALBUMIN SERPL ELPH-MCNC: 4.5 G/DL
ALP BLD-CCNC: 78 U/L
ALT SERPL-CCNC: 14 U/L
ANION GAP SERPL CALC-SCNC: 13 MMOL/L
AST SERPL-CCNC: 17 U/L
BASOPHILS # BLD AUTO: 0.06 K/UL
BASOPHILS NFR BLD AUTO: 0.7 %
BILIRUB SERPL-MCNC: 0.8 MG/DL
BUN SERPL-MCNC: 21 MG/DL
CALCIUM SERPL-MCNC: 9.4 MG/DL
CHLORIDE SERPL-SCNC: 103 MMOL/L
CHOLEST SERPL-MCNC: 150 MG/DL
CHOLEST/HDLC SERPL: 4.1 RATIO
CO2 SERPL-SCNC: 23 MMOL/L
CREAT SERPL-MCNC: 1.43 MG/DL
EOSINOPHIL # BLD AUTO: 0.15 K/UL
EOSINOPHIL NFR BLD AUTO: 1.8 %
ESTIMATED AVERAGE GLUCOSE: 117 MG/DL
GLUCOSE SERPL-MCNC: 94 MG/DL
HBA1C MFR BLD HPLC: 5.7 %
HCT VFR BLD CALC: 43 %
HDLC SERPL-MCNC: 37 MG/DL
HGB BLD-MCNC: 13.9 G/DL
IMM GRANULOCYTES NFR BLD AUTO: 0.5 %
LDLC SERPL CALC-MCNC: 87 MG/DL
LYMPHOCYTES # BLD AUTO: 1.61 K/UL
LYMPHOCYTES NFR BLD AUTO: 19.8 %
MAN DIFF?: NORMAL
MCHC RBC-ENTMCNC: 31.2 PG
MCHC RBC-ENTMCNC: 32.3 GM/DL
MCV RBC AUTO: 96.6 FL
MONOCYTES # BLD AUTO: 0.69 K/UL
MONOCYTES NFR BLD AUTO: 8.5 %
NEUTROPHILS # BLD AUTO: 5.58 K/UL
NEUTROPHILS NFR BLD AUTO: 68.7 %
PLATELET # BLD AUTO: 185 K/UL
POTASSIUM SERPL-SCNC: 4.9 MMOL/L
PROT SERPL-MCNC: 7.1 G/DL
RBC # BLD: 4.45 M/UL
RBC # FLD: 13.2 %
SODIUM SERPL-SCNC: 139 MMOL/L
TRIGL SERPL-MCNC: 129 MG/DL
TSH SERPL-ACNC: 6.12 UIU/ML
WBC # FLD AUTO: 8.13 K/UL

## 2019-07-18 LAB — 25(OH)D3 SERPL-MCNC: 56.9 NG/ML

## 2019-11-11 ENCOUNTER — LABORATORY RESULT (OUTPATIENT)
Age: 79
End: 2019-11-11

## 2019-11-12 ENCOUNTER — APPOINTMENT (OUTPATIENT)
Dept: CARDIOLOGY | Facility: CLINIC | Age: 79
End: 2019-11-12
Payer: MEDICARE

## 2019-11-12 ENCOUNTER — NON-APPOINTMENT (OUTPATIENT)
Age: 79
End: 2019-11-12

## 2019-11-12 VITALS
DIASTOLIC BLOOD PRESSURE: 88 MMHG | OXYGEN SATURATION: 98 % | RESPIRATION RATE: 17 BRPM | SYSTOLIC BLOOD PRESSURE: 175 MMHG | HEIGHT: 67 IN | BODY MASS INDEX: 25.74 KG/M2 | HEART RATE: 71 BPM | WEIGHT: 164 LBS

## 2019-11-12 VITALS — SYSTOLIC BLOOD PRESSURE: 140 MMHG | DIASTOLIC BLOOD PRESSURE: 90 MMHG

## 2019-11-12 DIAGNOSIS — I70.1 ATHEROSCLEROSIS OF RENAL ARTERY: ICD-10-CM

## 2019-11-12 DIAGNOSIS — R63.5 ABNORMAL WEIGHT GAIN: ICD-10-CM

## 2019-11-12 PROCEDURE — 99215 OFFICE O/P EST HI 40 MIN: CPT

## 2019-11-12 PROCEDURE — 93000 ELECTROCARDIOGRAM COMPLETE: CPT

## 2019-11-12 PROCEDURE — 36415 COLL VENOUS BLD VENIPUNCTURE: CPT

## 2019-11-12 NOTE — PHYSICAL EXAM
[General Appearance - Well Developed] : well developed [Well Groomed] : well groomed [Normal Appearance] : normal appearance [General Appearance - Well Nourished] : well nourished [No Deformities] : no deformities [General Appearance - In No Acute Distress] : no acute distress [Normal Oral Mucosa] : normal oral mucosa [No Oral Pallor] : no oral pallor [No Oral Cyanosis] : no oral cyanosis [Normal Jugular Venous A Waves Present] : normal jugular venous A waves present [Normal Oropharynx] : normal oropharynx [Normal Jugular Venous V Waves Present] : normal jugular venous V waves present [] : no respiratory distress [No Jugular Venous Malhotra A Waves] : no jugular venous malhotra A waves [Respiration, Rhythm And Depth] : normal respiratory rhythm and effort [Exaggerated Use Of Accessory Muscles For Inspiration] : no accessory muscle use [Auscultation Breath Sounds / Voice Sounds] : lungs were clear to auscultation bilaterally [Bowel Sounds] : normal bowel sounds [Abdomen Soft] : soft [Abdomen Tenderness] : non-tender [Abnormal Walk] : normal gait [Gait - Sufficient For Exercise Testing] : the gait was sufficient for exercise testing [Nail Clubbing] : no clubbing of the fingernails [Cyanosis, Localized] : no localized cyanosis [Skin Color & Pigmentation] : normal skin color and pigmentation [No Venous Stasis] : no venous stasis [No Xanthoma] : no  xanthoma was observed [Oriented To Time, Place, And Person] : oriented to person, place, and time [Impaired Insight] : insight and judgment were intact [Affect] : the affect was normal [Mood] : the mood was normal [No Anxiety] : not feeling anxious [Conjunctiva] : the conjunctiva were normal in both eyes [PERRL] : pupils were equal in size, round, and reactive to light [EOM Intact] : extraocular movements were intact [Yellow Sclera (Icteric)] : no scleral icterus was seen [5th Left ICS - MCL] : palpated at the 5th LICS in the midclavicular line [Normal] : normal [No Precordial Heave] : no precordial heave was noted [Normal Rate] : normal [Normal S1] : normal S1 [Rhythm Regular] : regular [Normal S2] : normal S2 [No Gallop] : no gallop heard [III] : a grade 3 [2+] : left 2+ [No Pitting Edema] : no pitting edema present

## 2019-11-12 NOTE — REASON FOR VISIT
[Follow-Up - From Hospitalization] : follow-up of a recent hospitalization for [Coronary Artery Disease] : coronary artery disease [Peripheral Vascular Disease] : peripheral vascular disease [Discharge Date: ___] : Discharge Date: [unfilled] [Admitted for Heart Failure] : patient was admitted for heart failure [FreeTextEntry1] : I met patient in the CCU during his hospitalization in February 2018.\par In March 2018, patient underwent revascularization of his right renal artery. Patient was discharged from [facility] on March 25, 2018.\par April 3rd, 2018 patient was noted to have symptomatic anemia and was admitted to [facility] for transfusion. He was discharged home on April 5, 2018. [Other: _____] : [unfilled]

## 2019-11-12 NOTE — HISTORY OF PRESENT ILLNESS
[FreeTextEntry1] : Patient is a 78 year-old gentleman with known history of renal cell carcinoma status post left nephrectomy, hypertension, dyslipidemia, AAA status-post EVAR on 1/29/2018 by Wyatt Young MD complicated by right renal artery occlusion leading to acute kidney injury requiring dialysis. Patient presented to [facility] as transfer from [facility] on 2/6/2018. At the time of transfer, patient was seen to be volume overloaded in the setting of acute decompensated systolic heart failure in the setting of acute on chronic renal insufficiency. The right renal artery could not be revascularized. Patient was also seen to have multivessel coronary artery disease and is status post PCI with RICHARDSON x2 to LAD, RICHARDSON x1 to OM1, and POBA to D1 on 2/18/2018. 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 Since the procedure, his urine output has normalized as has his creatinine. Dialysis was discontinued and his dialysis catheter was removed.\par \par He has been experiencing dizziness, and his labs reveal anemia with hemoglobin 9.05 g/dL on 5/9/2018. Creatinine is stable at 1.31 mg/dL. He has elected not to start ARB. He continues to take clopidogrel, warfarin, and metoprolol. He recently discontinued his amlodipine because of dizziness.\par \par He walks several miles per day without symptoms. He had been having orthopnea prior to his visit with me in April 2018, but now he sleeps comfortably with two pillows.\par \par Patient is concerned about recent hair loss. He believes it is either his metoprolol or his warfarin, and he has been on the metoprolol for years. This has improved since changing to apixaban.\par \par April 2019 - Patient had MCOT that did not show atrial fibrillation over a 20 day period. He is here today in his usual state of health. He has been seen by his nephrologist and his vascular surgeon. The vascular surgeon mentioned that he may need to revise the stent because US showed compromise of the renal artery stent.\par \par PMD: Asael Dang MD [phone]\par Cardiologist: Vladimir Jones MD [phone]\par Nephrologist: Ellis Stevens MD [phone]\par Vascular Surgeon: Pradip Cano MD at [facility] [phone]

## 2019-11-13 LAB
ALBUMIN SERPL ELPH-MCNC: 4.8 G/DL
ALP BLD-CCNC: 75 U/L
ALT SERPL-CCNC: 12 U/L
ANION GAP SERPL CALC-SCNC: 16 MMOL/L
AST SERPL-CCNC: 14 U/L
BASOPHILS # BLD AUTO: 0.05 K/UL
BASOPHILS NFR BLD AUTO: 0.6 %
BILIRUB SERPL-MCNC: 0.5 MG/DL
BUN SERPL-MCNC: 22 MG/DL
CALCIUM SERPL-MCNC: 9.7 MG/DL
CHLORIDE SERPL-SCNC: 106 MMOL/L
CHOLEST SERPL-MCNC: 149 MG/DL
CHOLEST/HDLC SERPL: 4 RATIO
CO2 SERPL-SCNC: 20 MMOL/L
CREAT SERPL-MCNC: 1.31 MG/DL
EOSINOPHIL # BLD AUTO: 0.12 K/UL
EOSINOPHIL NFR BLD AUTO: 1.5 %
ESTIMATED AVERAGE GLUCOSE: 117 MG/DL
GLUCOSE SERPL-MCNC: 107 MG/DL
HBA1C MFR BLD HPLC: 5.7 %
HCT VFR BLD CALC: 43 %
HDLC SERPL-MCNC: 37 MG/DL
HGB BLD-MCNC: 14 G/DL
IMM GRANULOCYTES NFR BLD AUTO: 0.6 %
LDLC SERPL CALC-MCNC: 75 MG/DL
LYMPHOCYTES # BLD AUTO: 1.69 K/UL
LYMPHOCYTES NFR BLD AUTO: 20.8 %
MAN DIFF?: NORMAL
MCHC RBC-ENTMCNC: 31.2 PG
MCHC RBC-ENTMCNC: 32.6 GM/DL
MCV RBC AUTO: 95.8 FL
MONOCYTES # BLD AUTO: 0.79 K/UL
MONOCYTES NFR BLD AUTO: 9.7 %
NEUTROPHILS # BLD AUTO: 5.42 K/UL
NEUTROPHILS NFR BLD AUTO: 66.8 %
PLATELET # BLD AUTO: 184 K/UL
POTASSIUM SERPL-SCNC: 5.2 MMOL/L
PROT SERPL-MCNC: 7.3 G/DL
RBC # BLD: 4.49 M/UL
RBC # FLD: 13 %
SODIUM SERPL-SCNC: 142 MMOL/L
TRIGL SERPL-MCNC: 183 MG/DL
TSH SERPL-ACNC: 6.7 UIU/ML
WBC # FLD AUTO: 8.12 K/UL

## 2019-11-14 ENCOUNTER — MEDICATION RENEWAL (OUTPATIENT)
Age: 79
End: 2019-11-14

## 2019-11-14 RX ORDER — CARVEDILOL 25 MG/1
25 TABLET, FILM COATED ORAL TWICE DAILY
Qty: 180 | Refills: 2 | Status: DISCONTINUED | COMMUNITY
Start: 2019-11-12 | End: 2019-11-14

## 2019-11-19 ENCOUNTER — RX RENEWAL (OUTPATIENT)
Age: 79
End: 2019-11-19

## 2019-12-02 ENCOUNTER — APPOINTMENT (OUTPATIENT)
Dept: CARDIOLOGY | Facility: CLINIC | Age: 79
End: 2019-12-02
Payer: MEDICARE

## 2019-12-02 PROCEDURE — 93306 TTE W/DOPPLER COMPLETE: CPT

## 2020-02-03 ENCOUNTER — APPOINTMENT (OUTPATIENT)
Dept: CARDIOLOGY | Facility: CLINIC | Age: 80
End: 2020-02-03

## 2020-02-24 ENCOUNTER — NON-APPOINTMENT (OUTPATIENT)
Age: 80
End: 2020-02-24

## 2020-02-24 ENCOUNTER — APPOINTMENT (OUTPATIENT)
Dept: CARDIOLOGY | Facility: CLINIC | Age: 80
End: 2020-02-24
Payer: MEDICARE

## 2020-02-24 VITALS — SYSTOLIC BLOOD PRESSURE: 136 MMHG | DIASTOLIC BLOOD PRESSURE: 84 MMHG

## 2020-02-24 VITALS
HEART RATE: 68 BPM | WEIGHT: 159 LBS | SYSTOLIC BLOOD PRESSURE: 152 MMHG | OXYGEN SATURATION: 96 % | BODY MASS INDEX: 24.9 KG/M2 | DIASTOLIC BLOOD PRESSURE: 76 MMHG

## 2020-02-24 DIAGNOSIS — R60.0 LOCALIZED EDEMA: ICD-10-CM

## 2020-02-24 PROCEDURE — 99215 OFFICE O/P EST HI 40 MIN: CPT

## 2020-02-24 PROCEDURE — 93000 ELECTROCARDIOGRAM COMPLETE: CPT

## 2020-02-24 NOTE — CARDIOLOGY SUMMARY
[No Ischemia] : no Ischemia [___] : [unfilled] [LVEF ___%] : LVEF [unfilled]% [Normal] : normal LA size [Severe] : severe LV dysfunction

## 2020-02-24 NOTE — DISCUSSION/SUMMARY
[FreeTextEntry1] : Patient is a very pleasant 79 year-old gentleman with history as above including RCC status post nephrectomy, AAA status post EVAR complicated by occlusion of remaining renal artery, initiation of HD, acute decompensated systolic heart failure secondary to ischemic cardiomyopathy, now status post PCI and subsequent revascularization of renal artery with interval improvement in renal function.\par Patient is maintaining sinus rhythm, and MCOT x3 weeks was negative for atrial fibrillation.\par \par Continue metoprolol succinate 100 mg daily for guideline based heart failure management for patient with reduced LVEF. For patient with rising blood pressure, would change to carvedilol 25 mg BID, then check renal function and follow-up with vascular surgeon regarding reassessing renal artery stent. Patient's daughter would prefer he remain on metoprolol. \par We considered low dose ARB for heart failure management, and Dr. Stevens agreed, but patient and his daughter, Jennifer decided against it.\par \par Patient's CHADSVASc = 5.\par Patient started on apixaban 5 mg BID for CVA prevention. His hair has stopped falling out and is growing back. Patient has been taking apixaban 2.5 mg daily (per patient's daughter, Jennifer's instructions). We discussed BID dosing, but patient's daughter refuses. They are not interested in Xarelto either.\par Encourage AliveCor Kardia device or Apple Watch as alternatives for monitoring for AFib if anticoagulation is ultimately held.\par \par Follow-up with Dr. Stevens as scheduled.\par Patient scheduled to see Dr. Cano in December 2019.\par Encourage adequate hydration.

## 2020-02-24 NOTE — HISTORY OF PRESENT ILLNESS
[FreeTextEntry1] : Patient is a 79 year-old gentleman with known history of renal cell carcinoma status post left nephrectomy, hypertension, dyslipidemia, AAA status-post EVAR on 1/29/2018 by Wyatt Young MD complicated by right renal artery occlusion leading to acute kidney injury requiring dialysis. Patient presented to [facility] as transfer from [facility] on 2/6/2018. At the time of transfer, patient was seen to be volume overloaded in the setting of acute decompensated systolic heart failure in the setting of acute on chronic renal insufficiency. The right renal artery could not be revascularized. Patient was also seen to have multivessel coronary artery disease and is status post PCI with RICHARDSON x2 to LAD, RICHARDSON x1 to OM1, and POBA to D1 on 2/18/2018. 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 Since the procedure, his urine output has normalized as has his creatinine. Dialysis was discontinued and his dialysis catheter was removed.\par \par He has been experiencing dizziness, and his labs reveal anemia with hemoglobin 9.05 g/dL on 5/9/2018. Creatinine is stable at 1.31 mg/dL. He has elected not to start ARB. He continues to take clopidogrel, warfarin, and metoprolol. He recently discontinued his amlodipine because of dizziness.\par \par He walks several miles per day without symptoms. He had been having orthopnea prior to his visit with me in April 2018, but now he sleeps comfortably with two pillows.\par \par Patient is concerned about recent hair loss. He believes it is either his metoprolol or his warfarin, and he has been on the metoprolol for years. This has improved since changing to apixaban.\par \par April 2019 - Patient had MCOT that did not show atrial fibrillation over a 20 day period. He is here today in his usual state of health. He has been seen by his nephrologist and his vascular surgeon. The vascular surgeon mentioned that he may need to revise the stent because US showed compromise of the renal artery stent.\par \par February 2020 - Patient returns today in his usual state of health. He is concerned about memory loss. Patient had a fight with his daughter, Angela, and they have not spoken in several months.\par \par PMD: Asael Dang MD [phone]\par Cardiologist: Vladimir Jones MD [phone]\par Nephrologist: Ellis Stevens MD [phone]\par Vascular Surgeon: Pradip Cano MD at [facility] [phone]

## 2020-02-24 NOTE — REASON FOR VISIT
[Follow-Up - From Hospitalization] : follow-up of a recent hospitalization for [Coronary Artery Disease] : coronary artery disease [Peripheral Vascular Disease] : peripheral vascular disease [Discharge Date: ___] : Discharge Date: [unfilled] [Admitted for Heart Failure] : patient was admitted for heart failure [FreeTextEntry1] : I met patient in the CCU during his hospitalization in February 2018.\par In March 2018, patient underwent revascularization of his right renal artery. Patient was discharged from [facility] on March 25, 2018.\par April 3rd, 2018 patient was noted to have symptomatic anemia and was admitted to [facility] for transfusion. He was discharged home on April 5, 2018.

## 2020-02-24 NOTE — PHYSICAL EXAM
[General Appearance - Well Developed] : well developed [Normal Appearance] : normal appearance [General Appearance - Well Nourished] : well nourished [Well Groomed] : well groomed [No Deformities] : no deformities [General Appearance - In No Acute Distress] : no acute distress [Normal Oral Mucosa] : normal oral mucosa [No Oral Pallor] : no oral pallor [No Oral Cyanosis] : no oral cyanosis [Normal Oropharynx] : normal oropharynx [Normal Jugular Venous V Waves Present] : normal jugular venous V waves present [Normal Jugular Venous A Waves Present] : normal jugular venous A waves present [] : no respiratory distress [No Jugular Venous Malhotra A Waves] : no jugular venous malhotra A waves [Respiration, Rhythm And Depth] : normal respiratory rhythm and effort [Auscultation Breath Sounds / Voice Sounds] : lungs were clear to auscultation bilaterally [Exaggerated Use Of Accessory Muscles For Inspiration] : no accessory muscle use [Abdomen Soft] : soft [Bowel Sounds] : normal bowel sounds [Abdomen Tenderness] : non-tender [Abnormal Walk] : normal gait [Gait - Sufficient For Exercise Testing] : the gait was sufficient for exercise testing [Nail Clubbing] : no clubbing of the fingernails [Cyanosis, Localized] : no localized cyanosis [Skin Color & Pigmentation] : normal skin color and pigmentation [No Venous Stasis] : no venous stasis [No Xanthoma] : no  xanthoma was observed [Oriented To Time, Place, And Person] : oriented to person, place, and time [Mood] : the mood was normal [Affect] : the affect was normal [Impaired Insight] : insight and judgment were intact [No Anxiety] : not feeling anxious [Conjunctiva] : the conjunctiva were normal in both eyes [EOM Intact] : extraocular movements were intact [PERRL] : pupils were equal in size, round, and reactive to light [Normal] : normal [5th Left ICS - MCL] : palpated at the 5th LICS in the midclavicular line [Normal Rate] : normal [Rhythm Regular] : regular [No Precordial Heave] : no precordial heave was noted [Normal S2] : normal S2 [Normal S1] : normal S1 [III] : a grade 3 [No Gallop] : no gallop heard [2+] : left 2+ [No Pitting Edema] : no pitting edema present [Yellow Sclera (Icteric)] : no scleral icterus was seen

## 2020-05-10 ENCOUNTER — RX RENEWAL (OUTPATIENT)
Age: 80
End: 2020-05-10

## 2020-05-21 ENCOUNTER — RX RENEWAL (OUTPATIENT)
Age: 80
End: 2020-05-21

## 2020-06-23 ENCOUNTER — APPOINTMENT (OUTPATIENT)
Dept: GERIATRICS | Facility: CLINIC | Age: 80
End: 2020-06-23
Payer: MEDICARE

## 2020-06-23 VITALS
DIASTOLIC BLOOD PRESSURE: 78 MMHG | SYSTOLIC BLOOD PRESSURE: 130 MMHG | HEIGHT: 67 IN | BODY MASS INDEX: 25.76 KG/M2 | HEART RATE: 87 BPM | TEMPERATURE: 98.6 F | RESPIRATION RATE: 14 BRPM | OXYGEN SATURATION: 96 % | WEIGHT: 164.13 LBS

## 2020-06-23 PROCEDURE — 99214 OFFICE O/P EST MOD 30 MIN: CPT

## 2020-06-23 NOTE — ASSESSMENT
[Regular activities] : regular physical, social and mental activities [FreeTextEntry1] : the patient is a 79-year-old man with a 2-3 year history of deteriorating memory. Past workup has been performed. CAT scan in the past has shown microvascular change. Mini-Mental status has deteriorated from 27-21. The patient has significant anxiety. He is independent in his ADLs. There is no hallucinations, depression or suicidal ideation. He is appropriate in his demeanor. He has failed a  trial of sertraline for depression. Willinitiate aricept

## 2020-06-23 NOTE — PHYSICAL EXAM
[General Appearance - Alert] : alert [Sclera] : the sclera and conjunctiva were normal [PERRL With Normal Accommodation] : pupils were equal in size, round, and reactive to light [Extraocular Movements] : extraocular movements were intact [Normal Oral Mucosa] : normal oral mucosa [No Oral Pallor] : no oral pallor [No Oral Cyanosis] : no oral cyanosis [Outer Ear] : the ears and nose were normal in appearance [Oropharynx] : The oropharynx was normal [Neck Appearance] : the appearance of the neck was normal [Neck Cervical Mass (___cm)] : no neck mass was observed [Jugular Venous Distention Increased] : there was no jugular-venous distention [Thyroid Diffuse Enlargement] : the thyroid was not enlarged [Thyroid Nodule] : there were no palpable thyroid nodules [Auscultation Breath Sounds / Voice Sounds] : lungs were clear to auscultation bilaterally [Heart Rate And Rhythm] : heart rate was normal and rhythm regular [Heart Sounds] : normal S1 and S2 [Heart Sounds Gallop] : no gallops [Murmurs] : no murmurs [Edema] : there was no peripheral edema [Full Pulse] : the pedal pulses are present [Heart Sounds Pericardial Friction Rub] : no pericardial rub [Breast Appearance] : normal in appearance [Breast Palpation Mass] : no palpable masses [Bowel Sounds] : normal bowel sounds [Abdomen Soft] : soft [Abdomen Mass (___ Cm)] : no abdominal mass palpated [Abdomen Tenderness] : non-tender [Cervical Lymph Nodes Enlarged Posterior Bilaterally] : posterior cervical [Cervical Lymph Nodes Enlarged Anterior Bilaterally] : anterior cervical [Axillary Lymph Nodes Enlarged Bilaterally] : axillary [Supraclavicular Lymph Nodes Enlarged Bilaterally] : supraclavicular [Femoral Lymph Nodes Enlarged Bilaterally] : femoral [Inguinal Lymph Nodes Enlarged Bilaterally] : inguinal [No CVA Tenderness] : no ~M costovertebral angle tenderness [No Spinal Tenderness] : no spinal tenderness [Abnormal Walk] : normal gait [Motor Tone] : muscle strength and tone were normal [Musculoskeletal - Swelling] : no joint swelling seen [Nail Clubbing] : no clubbing  or cyanosis of the fingernails [Skin Color & Pigmentation] : normal skin color and pigmentation [] : no rash [Skin Turgor] : normal skin turgor [Deep Tendon Reflexes (DTR)] : deep tendon reflexes were 2+ and symmetric [Total Score ___ / 30] : the patient achieved a score of [unfilled] /30 [Date / Time ___ / 5] : date / time [unfilled] / 5 [Place ___ / 5] : place [unfilled] / 5 [Registration ___ / 3] : registration [unfilled] / 3 [Serial Sevens ___/5] : serial sevens [unfilled] / 5 [Naming 2 Objects ___ / 2] : naming two objects [unfilled] / 2 [Repeating a Sentence ___ / 1] : repeating a sentence [unfilled] / 1 [Writing a Sentence ___ / 1] : write sentence [unfilled] / 1 [Written Command ___ / 1] : written command [unfilled] / 1 [3-stage Verbal Command ___ / 3] : three-stage verbal command [unfilled] / 3 [Copy a Design ___ / 1] : copy a design [unfilled] / 1 [Recall ___ / 3] : recall [unfilled] / 3

## 2020-06-23 NOTE — HISTORY OF PRESENT ILLNESS
[Mild] : Stage: Mild [Stable] : Status: Stable [Memory Lapses Or Loss] : worsened memory impairment [Patient Observed To Be Agitated] : denies agitation [Hostility Toward Caregivers] : denies aggression [Sleep Disturbances] : denies sleep disturbances [Fixed Beliefs Contradicted By Reality (Delusions)] : denies delusions [] : denies wandering [Difficulty Finding Desired Words] : denies difficulty finding desired words [FreeTextEntry1] : 78 yo male seen 1 year ago for poor memory.  Now here for 1 year fu and concern over worsening of short term memory. the patient is aware of his deficits. He is independent in his ADLs and IADLs.He denies depression or suicidal ideation. He does complain of anxiety. He recently failed a trial of sertraline.

## 2020-06-23 NOTE — REVIEW OF SYSTEMS
[Anxiety] : anxiety [Negative] : Heme/Lymph [Suicidal] : not suicidal [Sleep Disturbances] : no sleep disturbances

## 2020-06-29 ENCOUNTER — RX RENEWAL (OUTPATIENT)
Age: 80
End: 2020-06-29

## 2020-06-29 ENCOUNTER — APPOINTMENT (OUTPATIENT)
Dept: CARDIOLOGY | Facility: CLINIC | Age: 80
End: 2020-06-29
Payer: MEDICARE

## 2020-06-29 ENCOUNTER — NON-APPOINTMENT (OUTPATIENT)
Age: 80
End: 2020-06-29

## 2020-06-29 ENCOUNTER — LABORATORY RESULT (OUTPATIENT)
Age: 80
End: 2020-06-29

## 2020-06-29 VITALS
WEIGHT: 163 LBS | OXYGEN SATURATION: 97 % | HEIGHT: 67 IN | DIASTOLIC BLOOD PRESSURE: 72 MMHG | BODY MASS INDEX: 25.58 KG/M2 | TEMPERATURE: 97.9 F | SYSTOLIC BLOOD PRESSURE: 146 MMHG | HEART RATE: 69 BPM

## 2020-06-29 DIAGNOSIS — N28.89 OTHER SPECIFIED DISORDERS OF KIDNEY AND URETER: ICD-10-CM

## 2020-06-29 PROCEDURE — 99215 OFFICE O/P EST HI 40 MIN: CPT

## 2020-06-29 PROCEDURE — 93000 ELECTROCARDIOGRAM COMPLETE: CPT

## 2020-06-29 PROCEDURE — 36415 COLL VENOUS BLD VENIPUNCTURE: CPT

## 2020-06-29 NOTE — REASON FOR VISIT
[Follow-Up - From Hospitalization] : follow-up of a recent hospitalization for [Coronary Artery Disease] : coronary artery disease [Discharge Date: ___] : Discharge Date: [unfilled] [Peripheral Vascular Disease] : peripheral vascular disease [Other: _____] : [unfilled] [Admitted for Heart Failure] : patient was admitted for heart failure [FreeTextEntry1] : I met patient in the CCU during his hospitalization in February 2018.\par In March 2018, patient underwent revascularization of his right renal artery. Patient was discharged from [facility] on March 25, 2018.\par April 3rd, 2018 patient was noted to have symptomatic anemia and was admitted to [facility] for transfusion. He was discharged home on April 5, 2018.

## 2020-06-29 NOTE — PHYSICAL EXAM
Additional Notes: Previous dysplastic nevus with moderate atypia. No signs of recurrence today. Detail Level: Simple [Normal Appearance] : normal appearance [General Appearance - Well Developed] : well developed [Well Groomed] : well groomed [No Deformities] : no deformities [General Appearance - Well Nourished] : well nourished [General Appearance - In No Acute Distress] : no acute distress [Normal Oral Mucosa] : normal oral mucosa [Normal Oropharynx] : normal oropharynx [No Oral Pallor] : no oral pallor [No Oral Cyanosis] : no oral cyanosis [Normal Jugular Venous A Waves Present] : normal jugular venous A waves present [Normal Jugular Venous V Waves Present] : normal jugular venous V waves present [No Jugular Venous Malhotra A Waves] : no jugular venous malhotra A waves [] : no respiratory distress [Exaggerated Use Of Accessory Muscles For Inspiration] : no accessory muscle use [Respiration, Rhythm And Depth] : normal respiratory rhythm and effort [Auscultation Breath Sounds / Voice Sounds] : lungs were clear to auscultation bilaterally [Bowel Sounds] : normal bowel sounds [Abdomen Soft] : soft [Abdomen Tenderness] : non-tender [Abnormal Walk] : normal gait [Nail Clubbing] : no clubbing of the fingernails [Gait - Sufficient For Exercise Testing] : the gait was sufficient for exercise testing [No Venous Stasis] : no venous stasis [Skin Color & Pigmentation] : normal skin color and pigmentation [Cyanosis, Localized] : no localized cyanosis [Oriented To Time, Place, And Person] : oriented to person, place, and time [No Xanthoma] : no  xanthoma was observed [Impaired Insight] : insight and judgment were intact [Affect] : the affect was normal [Mood] : the mood was normal [No Anxiety] : not feeling anxious [Conjunctiva] : the conjunctiva were normal in both eyes [PERRL] : pupils were equal in size, round, and reactive to light [EOM Intact] : extraocular movements were intact [Yellow Sclera (Icteric)] : no scleral icterus was seen [5th Left ICS - MCL] : palpated at the 5th LICS in the midclavicular line [Normal] : normal [Normal Rate] : normal [No Precordial Heave] : no precordial heave was noted [Rhythm Regular] : regular [Normal S1] : normal S1 [Normal S2] : normal S2 [No Gallop] : no gallop heard [III] : a grade 3 [2+] : left 2+ [No Pitting Edema] : no pitting edema present

## 2020-06-29 NOTE — DISCUSSION/SUMMARY
[FreeTextEntry1] : Patient is a very pleasant 79 year-old gentleman with history as above including RCC status post nephrectomy, AAA status post EVAR complicated by occlusion of remaining renal artery, initiation of HD, acute decompensated systolic heart failure secondary to ischemic cardiomyopathy, now status post PCI and subsequent revascularization of renal artery with interval improvement in renal function.\par Patient is maintaining sinus rhythm, and MCOT x3 weeks was negative for atrial fibrillation.\par \par Continue metoprolol succinate 100 mg daily for guideline based heart failure management for patient with reduced LVEF. For patient with rising blood pressure, would change to carvedilol 25 mg BID, then check renal function and follow-up with vascular surgeon regarding reassessing renal artery stent. Patient's daughter would prefer he remain on metoprolol. \par We considered low dose ARB for heart failure management, and Dr. Stevens agreed, but patient and his daughter, Jennifer decided against it.\par \par Patient's CHADSVASc = 5.\par Patient started on apixaban 5 mg BID for CVA prevention. His hair has stopped falling out and is growing back. Patient has been taking apixaban 2.5 mg daily (per patient's daughter, Jennifer's instructions). We discussed BID dosing, but patient's daughter refuses. They are not interested in Xarelto either.\par Encourage AliveCor Kardia device or Apple Watch as alternatives for monitoring for AFib if anticoagulation is ultimately held.\par \par Follow-up with Dr. Stevens as scheduled.\par Encourage adequate hydration.

## 2020-06-29 NOTE — HISTORY OF PRESENT ILLNESS
[FreeTextEntry1] : Patient is a 79 year-old gentleman with known history of renal cell carcinoma status post left nephrectomy, hypertension, dyslipidemia, AAA status-post EVAR on 1/29/2018 by Wyatt Young MD complicated by right renal artery occlusion leading to acute kidney injury requiring dialysis. Patient presented to [facility] as transfer from [facility] on 2/6/2018. At the time of transfer, patient was seen to be volume overloaded in the setting of acute decompensated systolic heart failure in the setting of acute on chronic renal insufficiency. The right renal artery could not be revascularized. Patient was also seen to have multivessel coronary artery disease and is status post PCI with RICHARDSON x2 to LAD, RICHARDSON x1 to OM1, and POBA to D1 on 2/18/2018. 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 Since the procedure, his urine output has normalized as has his creatinine. Dialysis was discontinued and his dialysis catheter was removed.\par \par He has been experiencing dizziness, and his labs reveal anemia with hemoglobin 9.05 g/dL on 5/9/2018. Creatinine is stable at 1.31 mg/dL. He has elected not to start ARB. He continues to take clopidogrel, warfarin, and metoprolol. He recently discontinued his amlodipine because of dizziness.\par \par He walks several miles per day without symptoms. He had been having orthopnea prior to his visit with me in April 2018, but now he sleeps comfortably with two pillows.\par \par Patient is concerned about recent hair loss. He believes it is either his metoprolol or his warfarin, and he has been on the metoprolol for years. This has improved since changing to apixaban.\par \par April 2019 - Patient had MCOT that did not show atrial fibrillation over a 20 day period. He is here today in his usual state of health. He has been seen by his nephrologist and his vascular surgeon. The vascular surgeon mentioned that he may need to revise the stent because US showed compromise of the renal artery stent.\par \par February 2020 - Patient returns today in his usual state of health. He is concerned about memory loss. Patient had a fight with his daughter, Angela, and they have not spoken in several months. He believes the fight involved an invitation to his son-in-law's birthday.\par \par June 2020 - Patient returns today in his usual state of health. He is still concerned about memory loss and has been started on donepezil by Dr. Chavez. He continues to be upset by the fight with his daughter.\par \par PMD: Asael Dang MD [phone]\par Cardiologist: Vladimir Jones MD [phone]\par Nephrologist: Ellis Stevens MD [phone]\par Vascular Surgeon: Pradip Cano MD at [facility] [phone]

## 2020-06-30 LAB
25(OH)D3 SERPL-MCNC: 60.6 NG/ML
ALBUMIN SERPL ELPH-MCNC: 4.5 G/DL
ALP BLD-CCNC: 88 U/L
ALT SERPL-CCNC: 14 U/L
ANION GAP SERPL CALC-SCNC: 12 MMOL/L
AST SERPL-CCNC: 13 U/L
BASOPHILS # BLD AUTO: 0.06 K/UL
BASOPHILS NFR BLD AUTO: 0.7 %
BILIRUB SERPL-MCNC: 0.5 MG/DL
BUN SERPL-MCNC: 25 MG/DL
CALCIUM SERPL-MCNC: 9.6 MG/DL
CHLORIDE SERPL-SCNC: 105 MMOL/L
CHOLEST SERPL-MCNC: 166 MG/DL
CHOLEST/HDLC SERPL: 5.1 RATIO
CO2 SERPL-SCNC: 23 MMOL/L
CREAT SERPL-MCNC: 1.2 MG/DL
EOSINOPHIL # BLD AUTO: 0.11 K/UL
EOSINOPHIL NFR BLD AUTO: 1.3 %
ESTIMATED AVERAGE GLUCOSE: 114 MG/DL
GLUCOSE SERPL-MCNC: 83 MG/DL
HBA1C MFR BLD HPLC: 5.6 %
HCT VFR BLD CALC: 41.4 %
HDLC SERPL-MCNC: 33 MG/DL
HGB BLD-MCNC: 13.4 G/DL
IMM GRANULOCYTES NFR BLD AUTO: 0.4 %
LDLC SERPL CALC-MCNC: 66 MG/DL
LYMPHOCYTES # BLD AUTO: 1.64 K/UL
LYMPHOCYTES NFR BLD AUTO: 19.5 %
MAN DIFF?: NORMAL
MCHC RBC-ENTMCNC: 30.3 PG
MCHC RBC-ENTMCNC: 32.4 GM/DL
MCV RBC AUTO: 93.7 FL
MONOCYTES # BLD AUTO: 0.86 K/UL
MONOCYTES NFR BLD AUTO: 10.3 %
NEUTROPHILS # BLD AUTO: 5.69 K/UL
NEUTROPHILS NFR BLD AUTO: 67.8 %
PLATELET # BLD AUTO: 205 K/UL
POTASSIUM SERPL-SCNC: 5.1 MMOL/L
PROT SERPL-MCNC: 7.1 G/DL
RBC # BLD: 4.42 M/UL
RBC # FLD: 13.3 %
SARS-COV-2 IGG SERPL IA-ACNC: 135 INDEX
SARS-COV-2 IGG SERPL QL IA: POSITIVE
SODIUM SERPL-SCNC: 140 MMOL/L
TRIGL SERPL-MCNC: 340 MG/DL
TSH SERPL-ACNC: 5.31 UIU/ML
WBC # FLD AUTO: 8.39 K/UL

## 2020-09-03 ENCOUNTER — RX RENEWAL (OUTPATIENT)
Age: 80
End: 2020-09-03

## 2020-09-29 ENCOUNTER — APPOINTMENT (OUTPATIENT)
Dept: GERIATRICS | Facility: CLINIC | Age: 80
End: 2020-09-29

## 2020-11-03 ENCOUNTER — APPOINTMENT (OUTPATIENT)
Dept: GERIATRICS | Facility: CLINIC | Age: 80
End: 2020-11-03
Payer: MEDICARE

## 2020-11-03 VITALS
RESPIRATION RATE: 15 BRPM | DIASTOLIC BLOOD PRESSURE: 60 MMHG | BODY MASS INDEX: 26.29 KG/M2 | SYSTOLIC BLOOD PRESSURE: 156 MMHG | OXYGEN SATURATION: 98 % | HEART RATE: 67 BPM | WEIGHT: 167.5 LBS | TEMPERATURE: 97.5 F | HEIGHT: 67 IN

## 2020-11-03 DIAGNOSIS — F01.50 VASCULAR DEMENTIA W/OUT BEHAVIORAL DISTURBANCE: ICD-10-CM

## 2020-11-03 PROCEDURE — 99214 OFFICE O/P EST MOD 30 MIN: CPT

## 2020-11-03 RX ORDER — DONEPEZIL HYDROCHLORIDE 5 MG/1
5 TABLET ORAL
Qty: 90 | Refills: 0 | Status: DISCONTINUED | COMMUNITY
Start: 2020-06-23 | End: 2020-11-03

## 2020-11-03 NOTE — HISTORY OF PRESENT ILLNESS
[Mild] : Stage: Mild [Stable] : Status: Stable [Memory Lapses Or Loss] : stable memory impairment [Patient Observed To Be Agitated] : denies agitation [Hostility Toward Caregivers] : denies aggression [Sleep Disturbances] : denies sleep disturbances [] : denies wandering [Fixed Beliefs Contradicted By Reality (Delusions)] : denies delusions [Difficulty Finding Desired Words] : denies difficulty finding desired words [FreeTextEntry1] : the patient is a 79-year-old man with a three-year history of deteriorating memory. He is followed by primary care Dr Dang and cardiology. Started on arricept 3 years ago but stopped it immediately because of GI sx. Per wife he is "fine ' except for memory. No hallucinations or agitation. ast evaluation has revealed ischemic changes on CAT scan. 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 He is currently asymptomatic except for poor memory. There is no loose medications or agitation. He has previously been treated for anxiety. Zoloft was not well tolerated.

## 2020-11-03 NOTE — PHYSICAL EXAM
[General Appearance - Alert] : alert [General Appearance - In No Acute Distress] : in no acute distress [Sclera] : the sclera and conjunctiva were normal [PERRL With Normal Accommodation] : pupils were equal in size, round, and reactive to light [Extraocular Movements] : extraocular movements were intact [Normal Oral Mucosa] : normal oral mucosa [No Oral Pallor] : no oral pallor [No Oral Cyanosis] : no oral cyanosis [Outer Ear] : the ears and nose were normal in appearance [Oropharynx] : The oropharynx was normal [Neck Appearance] : the appearance of the neck was normal [Neck Cervical Mass (___cm)] : no neck mass was observed [Jugular Venous Distention Increased] : there was no jugular-venous distention [Thyroid Diffuse Enlargement] : the thyroid was not enlarged [Thyroid Nodule] : there were no palpable thyroid nodules [Auscultation Breath Sounds / Voice Sounds] : lungs were clear to auscultation bilaterally [Heart Rate And Rhythm] : heart rate was normal and rhythm regular [Heart Sounds] : normal S1 and S2 [Heart Sounds Gallop] : no gallops [Murmurs] : no murmurs [Heart Sounds Pericardial Friction Rub] : no pericardial rub [Full Pulse] : the pedal pulses are present [Edema] : there was no peripheral edema [Breast Appearance] : normal in appearance [Breast Palpation Mass] : no palpable masses [Bowel Sounds] : normal bowel sounds [Abdomen Soft] : soft [Abdomen Tenderness] : non-tender [Abdomen Mass (___ Cm)] : no abdominal mass palpated [Cervical Lymph Nodes Enlarged Posterior Bilaterally] : posterior cervical [Cervical Lymph Nodes Enlarged Anterior Bilaterally] : anterior cervical [Supraclavicular Lymph Nodes Enlarged Bilaterally] : supraclavicular [Axillary Lymph Nodes Enlarged Bilaterally] : axillary [Femoral Lymph Nodes Enlarged Bilaterally] : femoral [Inguinal Lymph Nodes Enlarged Bilaterally] : inguinal [Skin Color & Pigmentation] : normal skin color and pigmentation [Skin Turgor] : normal skin turgor [] : no rash [Deep Tendon Reflexes (DTR)] : deep tendon reflexes were 2+ and symmetric [Sensation] : the sensory exam was normal to light touch and pinprick [No Focal Deficits] : no focal deficits [Oriented To Time, Place, And Person] : oriented to person, place, and time

## 2020-11-03 NOTE — ASSESSMENT
[FreeTextEntry1] : the patient is a 71-year-old male with multi-infarct dementia. He is stable. There is no agitation. He is independent in activities of daily living. He did not tolerate Aricept. He did not tolerate Zoloft. We'll maximize treatment for control of blood pressure. The patient is on Plavix and eliquis. We'll follow up with cardiology and primary care.Will return to office as required. There is no indication from amantadine at this time.

## 2020-12-18 ENCOUNTER — RX RENEWAL (OUTPATIENT)
Age: 80
End: 2020-12-18

## 2021-05-10 ENCOUNTER — APPOINTMENT (OUTPATIENT)
Dept: GERIATRICS | Facility: CLINIC | Age: 81
End: 2021-05-10

## 2021-05-17 ENCOUNTER — RX RENEWAL (OUTPATIENT)
Age: 81
End: 2021-05-17

## 2021-07-12 ENCOUNTER — APPOINTMENT (OUTPATIENT)
Dept: CARDIOLOGY | Facility: CLINIC | Age: 81
End: 2021-07-12
Payer: MEDICARE

## 2021-07-12 ENCOUNTER — NON-APPOINTMENT (OUTPATIENT)
Age: 81
End: 2021-07-12

## 2021-07-12 VITALS
HEART RATE: 74 BPM | DIASTOLIC BLOOD PRESSURE: 81 MMHG | WEIGHT: 167 LBS | SYSTOLIC BLOOD PRESSURE: 154 MMHG | OXYGEN SATURATION: 99 % | BODY MASS INDEX: 26.21 KG/M2 | HEIGHT: 67 IN

## 2021-07-12 VITALS — DIASTOLIC BLOOD PRESSURE: 80 MMHG | SYSTOLIC BLOOD PRESSURE: 138 MMHG

## 2021-07-12 DIAGNOSIS — R41.3 OTHER AMNESIA: ICD-10-CM

## 2021-07-12 DIAGNOSIS — Z79.01 LONG TERM (CURRENT) USE OF ANTICOAGULANTS: ICD-10-CM

## 2021-07-12 PROCEDURE — 93000 ELECTROCARDIOGRAM COMPLETE: CPT

## 2021-07-12 PROCEDURE — 99215 OFFICE O/P EST HI 40 MIN: CPT

## 2021-07-12 NOTE — REASON FOR VISIT
[Arrhythmia/ECG Abnorrmalities] : arrhythmia/ECG abnormalities [Hypertension] : hypertension [Coronary Artery Disease] : coronary artery disease [FreeTextEntry1] : July 2021 - Patient returns today for follow-up in his usual state of health. \par He walks for exercise and does very light weights.\par He has no complaints of chest pain, shortness of breath, and lower extremity edema.\par He continues to take apixaban 2.5 mg BID and clopidogrel 75 mg daily. He also takes metoprolol succinate and rosuvastatin.

## 2021-07-12 NOTE — DISCUSSION/SUMMARY
[FreeTextEntry1] : Patient is a very pleasant 80 year-old gentleman with history as above including RCC status post nephrectomy, AAA status post EVAR complicated by occlusion of remaining renal artery, initiation of HD, acute decompensated systolic heart failure secondary to ischemic cardiomyopathy, now status post PCI and subsequent revascularization of renal artery with interval improvement in renal function.\par Patient is maintaining sinus rhythm, and MCOT x3 weeks was negative for atrial fibrillation.\par \par Continue metoprolol succinate 100 mg daily for guideline based heart failure management for patient with reduced LVEF. For patient with rising blood pressure, would change to carvedilol 25 mg BID, then check renal function and follow-up with vascular surgeon regarding reassessing renal artery stent. Patient's daughter would prefer he remain on metoprolol. \par We considered low dose ARB for heart failure management, and Dr. Stevens agreed, but patient and his daughter, Jennifer decided against it.\par \par Patient's CHADSVASc = 5.\par Patient started on apixaban 5 mg BID for CVA prevention. His hair has stopped falling out and is growing back. Patient has been taking apixaban 2.5 mg daily (per patient's daughter, Jennifer's instructions). We discussed BID dosing, but patient's daughter refused. They are not interested in Xarelto either.\par Encourage AliveCor Kardia device or Apple Watch as alternatives for monitoring for AFib if anticoagulation is ultimately held.\par \par Follow-up with Dr. Stevens as scheduled.\par Encourage adequate hydration. \par \par Strongly encouraged vaccination against Covid-19.\par Follow-up in 6 months or earlier as needed.

## 2021-07-12 NOTE — CARDIOLOGY SUMMARY
[de-identified] : 2/24/2018, sinus 86 bpm with left atrial enlargement, IVCD (LBBB like)  [de-identified] : 1/17/2018, no Ischemia, normal MPI, LVEF 73% [de-identified] : 2/18/2018, moderate AS, LEAH 1.2 sqcm by continuity, severe LV dysfunction, normal LA size LVEF 25%. \par 4/20/2018, mild AS, LEAH 1.5 sqcm, mild-moderate AR, moderate global LV dysfunction, mildly enlarged LA, LVEF 40%\par 12/2/2019, moderate AS, LEAH 1.4 sqcm, mild-moderate AR, low normal LV systolic function, normal LA size, LVEF 50-55%  [de-identified] : 2/18/2018 by Nir Fofana MD at [facility], 95% prox-LAD, 90% D2, 90% OM1 \par Stent: 2/18/2018 RICHARDSON to prox-LAD, RICHARDSON to OM1, POBA to D2

## 2021-07-12 NOTE — DIETITIAN INITIAL EVALUATION ADULT. - DOB: +DATEOFBIRTH
Patient Name: Cristian Watt  : 1944    MRN: [id number]                              Today's Date: 2021       Admit Date: 2021    Visit Dx:     ICD-10-CM ICD-9-CM   1. Acute low back pain with right-sided sciatica, unspecified back pain laterality  M54.41 724.2     724.3   2. History of diabetes mellitus  Z86.39 V12.29   3. Impaired functional mobility, balance, gait, and endurance  Z74.09 V49.89   4. Compression fracture of T12 vertebra, initial encounter (CMS/[facility])  S22.080A 805.2     Patient Active Problem List   Diagnosis   • C. difficile colitis   • Disc degeneration, lumbar   • Osteoarthritis   • Uncontrolled type 2 diabetes mellitus with hyperglycemia (CMS/[facility])   • Hypertension   • Diarrhea   • Mixed hyperlipidemia   • Diabetic autonomic neuropathy associated with type 2 diabetes mellitus (CMS/[facility])   • Episode of recurrent major depressive disorder (CMS/[facility])   • Cigarette nicotine dependence with nicotine-induced disorder   • Compression fracture of L3 vertebra (CMS/[facility])   • Compression fracture of T12 vertebra (CMS/[facility])   • Mood disorder (CMS/[facility])   • Chronic bilateral low back pain   • Acute low back pain with right-sided sciatica   • Intractable back pain     Past Medical History:   Diagnosis Date   • Depression    • Diabetes mellitus (CMS/[facility])    • Disc degeneration, lumbar    • Emphysema of lung (CMS/[facility])    • Generalized anxiety disorder    • GERD (gastroesophageal reflux disease)    • Hyperlipidemia    • Hypertension    • Hypogonadism male    • Osteoarthritis    • Type 2 diabetes mellitus, uncontrolled (CMS/[facility])    • Vitamin D deficiency      Past Surgical History:   Procedure Laterality Date   • HERNIA REPAIR      Inguinal   • KNEE ARTHROPLASTY       General Information     Row Name 21 1423          Physical Therapy Time and Intention    Document Type  therapy note (daily note)  -     Mode of Treatment  physical therapy  -     Row Name 21 1423          General  Information    Patient Profile Reviewed  yes  -     Existing Precautions/Restrictions  spinal;fall T12, L3 fractures; anticipating kyphoplasty 07/14  -     Row Name 07/12/21 1423          Cognition    Orientation Status (Cognition)  oriented x 3  -     Row Name 07/12/21 1423          Safety Issues, Functional Mobility    Safety Issues Affecting Function (Mobility)  insight into deficits/self-awareness  -     Impairments Affecting Function (Mobility)  balance;endurance/activity tolerance;pain;strength;postural/trunk control  -     Comment, Safety Issues/Impairments (Mobility)  Refusing OOB  -       User Key  (r) = Recorded By, (t) = Taken By, (c) = Cosigned By    Initials Name Provider Type    Justin Scales, LEIDA Physical Therapist        Mobility     Row Name 07/12/21 1424          Bed Mobility    Comment (Bed Mobility)  Refusing OOB  -     Row Name 07/12/21 1424          Transfers    Comment (Transfers)  Pt declined  -     Row Name 07/12/21 1424          Gait/Stairs (Locomotion)    Comment (Gait/Stairs)  Pt declined  -       User Key  (r) = Recorded By, (t) = Taken By, (c) = Cosigned By    Initials Name Provider Type    Justin Scales, LEIDA Physical Therapist        Obj/Interventions     Row Name 07/12/21 1424          Motor Skills    Therapeutic Exercise  other (see comments) Pt education and HEP packet provided for prehab/rehab for anticipated surgery on 7/14. Exercises included ankle pumps, quad sets, glute sets, heel slides, ab sets, bent knee fall outs, overhead flexion.  -       User Key  (r) = Recorded By, (t) = Taken By, (c) = Cosigned By    Initials Name Provider Type    Justin Scales PT Physical Therapist        Goals/Plan    No documentation.       Clinical Impression     Row Name 07/12/21 1426          Pain    Additional Documentation  Pain Scale: FACES Pre/Post-Treatment (Group)  -     Row Name 07/12/21 1426          Pain Scale: FACES Pre/Post-Treatment    Pain: FACES  Scale, Pretreatment  6-->hurts even more  -     Posttreatment Pain Rating  6-->hurts even more  -     Pain Location - Orientation  posterior  -     Pain Location  back  -     Row Name 07/12/21 1426          Plan of Care Review    Plan of Care Reviewed With  patient  -     Progress  no change  -     Outcome Summary  Pt declined OOB or ther ex this date. Spent 10 minutes educating pt on importance of PT services, improving his mobility, and expectations for post-surgery care. Educated pt on spinal precautions and HEP with packet provided to pt. Educated pt on d/c recommendations to improve independence with mobility and safety and recommend IRF at d/c.  -[facility] Name 07/12/21 1426          Therapy Assessment/Plan (PT)    Rehab Potential (PT)  fair, will monitor progress closely  -     Criteria for Skilled Interventions Met (PT)  yes;skilled treatment is necessary  -[facility] Name 07/12/21 1426          Vital Signs    Pre Patient Position  Supine  -     Intra Patient Position  Supine  -     Post Patient Position  Supine  -[facility] Name 07/12/21 1426          Positioning and Restraints    Pre-Treatment Position  in bed  -     Post Treatment Position  bed  -     In Bed  notified nsg;supine;call light within reach;encouraged to call for assist;exit alarm on  -       User Key  (r) = Recorded By, (t) = Taken By, (c) = Cosigned By    Initials Name Provider Type     Justin Munguia, PT Physical Therapist        Outcome Measures     Row Name 07/12/21 1428 07/12/21 0808       How much help from another person do you currently need...    Turning from your back to your side while in flat bed without using bedrails?  3  -  3  -JL    Moving from lying on back to sitting on the side of a flat bed without bedrails?  3  -  3  -JL    Moving to and from a bed to a chair (including a wheelchair)?  2  -  2  -JL    Standing up from a chair using your arms (e.g., wheelchair, bedside chair)?  2  -  2   -REUBEN    Climbing 3-5 steps with a railing?  2  -CHELI  2  -REUBEN    To walk in hospital room?  2  -  2  -JL    AM-PAC 6 Clicks Score (PT)  14  -  14  -    Row Name 07/12/21 1428          Functional Assessment    Outcome Measure Options  AM-PAC 6 Clicks Basic Mobility (PT)  -       User Key  (r) = Recorded By, (t) = Taken By, (c) = Cosigned By    Initials Name Provider Type    Myles River RN Registered Nurse    Justin Scales, LEIDA Physical Therapist        Physical Therapy Education                 Title: PT OT SLP Therapies (In Progress)     Topic: Physical Therapy (Done)     Point: Mobility training (Done)     Learning Progress Summary           Patient Acceptance, E,TB, VU by  at 7/12/2021 1428    Comment: Pt education and HEP packet provided for prehab/rehab for anticipated surgery on 7/14. Exercises included ankle pumps, quad sets, glute sets, heel slides, ab sets, bent knee fall outs, overhead flexion. Edu on d/c recommendations and spinal precautions.    Eager, E, VU,NR by SC at 7/11/2021 1046    Comment: reviewed HEP in bed   Family Acceptance, E,TB, VU by  at 7/12/2021 1428    Comment: Pt education and HEP packet provided for prehab/rehab for anticipated surgery on 7/14. Exercises included ankle pumps, quad sets, glute sets, heel slides, ab sets, bent knee fall outs, overhead flexion. Edu on d/c recommendations and spinal precautions.                   Point: Home exercise program (Done)     Learning Progress Summary           Patient Acceptance, E,TB, VU by  at 7/12/2021 1428    Comment: Pt education and HEP packet provided for prehab/rehab for anticipated surgery on 7/14. Exercises included ankle pumps, quad sets, glute sets, heel slides, ab sets, bent knee fall outs, overhead flexion. Edu on d/c recommendations and spinal precautions.    Mayurer, CONSTANTINE, VU,NR by SC at 7/11/2021 1046    Comment: reviewed HEP in bed    Nonacceptance, E,D, VU,NR by  at 7/8/2021 1522   Family Acceptance, E,TB, VU  by  at 7/12/2021 1428    Comment: Pt education and HEP packet provided for prehab/rehab for anticipated surgery on 7/14. Exercises included ankle pumps, quad sets, glute sets, heel slides, ab sets, bent knee fall outs, overhead flexion. Edu on d/c recommendations and spinal precautions.                   Point: Body mechanics (Done)     Learning Progress Summary           Patient Acceptance, E,TB, VU by  at 7/12/2021 1428    Comment: Pt education and HEP packet provided for prehab/rehab for anticipated surgery on 7/14. Exercises included ankle pumps, quad sets, glute sets, heel slides, ab sets, bent knee fall outs, overhead flexion. Edu on d/c recommendations and spinal precautions.    CONSTANTINE Flanagan, VU,NR by SC at 7/11/2021 1046    Comment: reviewed HEP in bed   Family Acceptance, E,TB, VU by  at 7/12/2021 1428    Comment: Pt education and HEP packet provided for prehab/rehab for anticipated surgery on 7/14. Exercises included ankle pumps, quad sets, glute sets, heel slides, ab sets, bent knee fall outs, overhead flexion. Edu on d/c recommendations and spinal precautions.                   Point: Precautions (Done)     Learning Progress Summary           Patient Acceptance, E,TB, VU by  at 7/12/2021 1428    Comment: Pt education and HEP packet provided for prehab/rehab for anticipated surgery on 7/14. Exercises included ankle pumps, quad sets, glute sets, heel slides, ab sets, bent knee fall outs, overhead flexion. Edu on d/c recommendations and spinal precautions.    CONSTANTINE Flanagan VU,NR by SC at 7/11/2021 1046    Comment: reviewed HEP in bed   Family Acceptance, E,TB, VU by  at 7/12/2021 1428    Comment: Pt education and HEP packet provided for prehab/rehab for anticipated surgery on 7/14. Exercises included ankle pumps, quad sets, glute sets, heel slides, ab sets, bent knee fall outs, overhead flexion. Edu on d/c recommendations and spinal precautions.                               User Key     Initials  Effective Dates Name Provider Type Discipline    SC 06/16/21 -  Deyvi Neal PT Physical Therapist PT     06/16/21 -  Loyda Delarosa PT Physical Therapist PT     09/22/20 -  Justin Munguia PT Physical Therapist PT              PT Recommendation and Plan     Plan of Care Reviewed With: patient  Progress: no change  Outcome Summary: Pt declined OOB or ther ex this date. Spent 10 minutes educating pt on importance of PT services, improving his mobility, and expectations for post-surgery care. Educated pt on spinal precautions and HEP with packet provided to pt. Educated pt on d/c recommendations to improve independence with mobility and safety and recommend IRF at d/c.     Time Calculation:   PT Charges     Row Name 07/12/21 1430             Time Calculation    Start Time  1412  -      PT Received On  07/12/21  -      PT Goal Re-Cert Due Date  07/18/21  -         Time Calculation- PT    Total Timed Code Minutes- PT  10 minute(s)  -         Timed Charges    70160 - PT Therapeutic Exercise Minutes  10  -JH         Total Minutes    Timed Charges Total Minutes  10  -       Total Minutes  10  -JH        User Key  (r) = Recorded By, (t) = Taken By, (c) = Cosigned By    Initials Name Provider Type     Justin Munguia PT Physical Therapist        Therapy Charges for Today     Code Description Service Date Service Provider Modifiers Qty    [id number] HC PT THER PROC EA 15 MIN 7/12/2021 Justin Munguia PT GP 1          PT G-Codes  Outcome Measure Options: AM-PAC 6 Clicks Basic Mobility (PT)  AM-PAC 6 Clicks Score (PT): 14  AM-PAC 6 Clicks Score (OT): 13    Justin Munguia PT  7/12/2021     Statement Selected

## 2021-07-12 NOTE — HISTORY OF PRESENT ILLNESS
[FreeTextEntry1] : I met patient in the CCU during his hospitalization in February 2018.\par In March 2018, patient underwent revascularization of his right renal artery. Patient was discharged from [facility] on March 25, 2018.\par April 3rd, 2018 patient was noted to have symptomatic anemia and was admitted to [facility] for transfusion. He was discharged home on April 5, 2018.\par \par Patient is a 80 year-old gentleman with known history of renal cell carcinoma status post left nephrectomy, hypertension, dyslipidemia, AAA status-post EVAR on 1/29/2018 by Wyatt Young MD complicated by right renal artery occlusion leading to acute kidney injury requiring dialysis. Patient presented to [facility] as transfer from [facility] on 2/6/2018. At the time of transfer, patient was seen to be volume overloaded in the setting of acute decompensated systolic heart failure in the setting of acute on chronic renal insufficiency. The right renal artery could not be revascularized. Patient was also seen to have multivessel coronary artery disease and is status post PCI with RICHARDSON x2 to LAD, RICHARDSON x1 to OM1, and POBA to D1 on 2/18/2018. 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 Since the procedure, his urine output has normalized as has his creatinine. Dialysis was discontinued and his dialysis catheter was removed.\par \par He has been experiencing dizziness, and his labs reveal anemia with hemoglobin 9.05 g/dL on 5/9/2018. Creatinine is stable at 1.31 mg/dL. He has elected not to start ARB. He continues to take clopidogrel, warfarin, and metoprolol. He recently discontinued his amlodipine because of dizziness.\par \par He walks several miles per day without symptoms. He had been having orthopnea prior to his visit with me in April 2018, but now he sleeps comfortably with two pillows.\par \par Patient is concerned about recent hair loss. He believes it is either his metoprolol or his warfarin, and he has been on the metoprolol for years. This has improved since changing to apixaban.\par \par April 2019 - Patient had MCOT that did not show atrial fibrillation over a 20 day period. He is here today in his usual state of health. He has been seen by his nephrologist and his vascular surgeon. The vascular surgeon mentioned that he may need to revise the stent because US showed compromise of the renal artery stent.\par \par February 2020 - Patient returns today in his usual state of health. He is concerned about memory loss. Patient had a fight with his daughter, Angela, and they have not spoken in several months. He believes the fight involved an invitation to his son-in-law's birthday.\par \par June 2020 - Patient returns today in his usual state of health. He is still concerned about memory loss and has been started on donepezil by Dr. Chavez. He continues to be upset by the fight with his daughter.\par \par PMD: Asael Dang MD [phone]\par Cardiologist: Vladimir Jones MD [phone]\par Nephrologist: Ellis Stevens MD [phone]\par Vascular Surgeon: Pradip Cano MD at [facility] [phone]

## 2021-08-19 ENCOUNTER — RX RENEWAL (OUTPATIENT)
Age: 81
End: 2021-08-19

## 2021-10-26 NOTE — DISCHARGE NOTE ADULT - PLAN OF CARE
You will remain free of shortness of breath. Take your medications as prescribed. Follow a  low-salt, low cholesterol heart healthy diet. Weigh yourself every day; call your doctor if you gain 2 pounds over one to two days or 3 pounds over three days. Get to or maintain a healthy weight; ask your heart failure team for referrals to a registered dietitian if needed. Be active (check with your physician or cardiologist first). Find healthy ways to deal with stress, such as deep breathing, meditation, exercise, and doing hobbies that you enjoy. If you smoke, quit. (A resource to help you stop smoking is the [facility] – phone number [phone].). You will continue with dialysis and being monitored by a kidney speciaist.   Avoid taking (NSAIDs) - (ex: Ibuprofen, Advil, Celebrex, Naprosyn)  Avoid taking any nephrotoxic agents (can harm kidneys) - Intravenous contrast for diagnostic testing, combination cold medications.  Have all medications adjusted for your renal function by your Health Care Provider.  Blood pressure control is important.  Take all medication as prescribed. You will continue with dialysis and being monitored by a kidney specialist.   Avoid taking (NSAIDs) - (ex: Ibuprofen, Advil, Celebrex, Naprosyn)  Avoid taking any nephrotoxic agents (can harm kidneys) - Intravenous contrast for diagnostic testing, combination cold medications.  Have all medications adjusted for your renal function by your Health Care Provider.  Blood pressure control is important.  Take all medication as prescribed. No

## 2022-02-22 ENCOUNTER — RESULT CHARGE (OUTPATIENT)
Age: 82
End: 2022-02-22

## 2022-02-23 ENCOUNTER — APPOINTMENT (OUTPATIENT)
Dept: CARDIOLOGY | Facility: CLINIC | Age: 82
End: 2022-02-23

## 2022-02-23 ENCOUNTER — APPOINTMENT (OUTPATIENT)
Dept: CARDIOLOGY | Facility: CLINIC | Age: 82
End: 2022-02-23
Payer: MEDICARE

## 2022-02-23 ENCOUNTER — NON-APPOINTMENT (OUTPATIENT)
Age: 82
End: 2022-02-23

## 2022-02-23 VITALS
BODY MASS INDEX: 26.94 KG/M2 | WEIGHT: 172 LBS | OXYGEN SATURATION: 98 % | SYSTOLIC BLOOD PRESSURE: 140 MMHG | HEART RATE: 89 BPM | DIASTOLIC BLOOD PRESSURE: 90 MMHG

## 2022-02-23 PROCEDURE — 99215 OFFICE O/P EST HI 40 MIN: CPT

## 2022-02-23 PROCEDURE — 93000 ELECTROCARDIOGRAM COMPLETE: CPT

## 2022-02-25 NOTE — REASON FOR VISIT
[Arrhythmia/ECG Abnorrmalities] : arrhythmia/ECG abnormalities [Hypertension] : hypertension [Coronary Artery Disease] : coronary artery disease [FreeTextEntry1] : 2/23/2022.\par He returns today for follow up and has been feeling well overall.\par He continues to exercise by walking and lifting weights without any real difficulty. No chest pain or shortness of breath. Occasionally he reports feeling lightheaded upon standing, but he has been experiencing this for years and is not orthostatic in the office today.\par We reviewed his medications and he is taking all as directed, including low dose Eliquis BID.\par \par

## 2022-02-25 NOTE — END OF VISIT
[Time Spent: ___ minutes] : I have spent [unfilled] minutes of time on the encounter. [FreeTextEntry3] : I saw the patient with Beckie Frye NP and I agree with the findings as above.

## 2022-02-25 NOTE — CARDIOLOGY SUMMARY
[de-identified] : 2/24/2018, sinus 86 bpm with left atrial enlargement, IVCD (LBBB like) \par 2/23/2022, normal sinus rhythm, 84 BPM, LAE, left axis, LBBB, poor RWP, unchanged from prior ECG>  [de-identified] : 1/17/2018, no Ischemia, normal MPI, LVEF 73% [de-identified] : 2/18/2018, moderate AS, LEAH 1.2 sqcm by continuity, severe LV dysfunction, normal LA size LVEF 25%. \par 4/20/2018, mild AS, LEAH 1.5 sqcm, mild-moderate AR, moderate global LV dysfunction, mildly enlarged LA, LVEF 40%\par 12/2/2019, moderate AS, LEAH 1.4 sqcm, mild-moderate AR, low normal LV systolic function, normal LA size, LVEF 50-55%  [de-identified] : 2/18/2018 by Nir Fofana MD at [facility], 95% prox-LAD, 90% D2, 90% OM1 \par Stent: 2/18/2018 RICHARDSON to prox-LAD, RICHARDSON to OM1, POBA to D2
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
## 2022-02-25 NOTE — DISCUSSION/SUMMARY
[FreeTextEntry1] : Patient is a very pleasant 81 year-old gentleman with history as above including RCC status post nephrectomy, AAA status post EVAR complicated by occlusion of remaining renal artery, initiation of HD, acute decompensated systolic heart failure secondary to ischemic cardiomyopathy, now status post PCI and subsequent revascularization of renal artery with interval improvement in renal function.\par Patient is maintaining sinus rhythm, and MCOT x3 weeks was negative for atrial fibrillation.\par \par Continue metoprolol succinate 100 mg daily for guideline based heart failure management for patient with reduced LVEF. For patient with rising blood pressure, would change to carvedilol 25 mg BID, then check renal function and follow-up with vascular surgeon regarding reassessing renal artery stent. \par \par We considered low dose ARB for heart failure management, and Dr. Stevens agreed, but patient and his daughter, Jennifer decided against it.\par \par Patient's CHADSVASc = 5.\par Patient started on apixaban 5 mg BID for CVA prevention. His hair has stopped falling out and is growing back. Patient has been taking apixaban 2.5 mg BID as directed.\par \par Repeat echocardiogram to monitor LV function.\par \par Follow-up with Dr. Stevens as scheduled.\par Encourage adequate hydration. \par \par Follow-up in 6 months or earlier as needed.

## 2022-03-09 ENCOUNTER — APPOINTMENT (OUTPATIENT)
Dept: CARDIOLOGY | Facility: CLINIC | Age: 82
End: 2022-03-09
Payer: MEDICARE

## 2022-03-09 ENCOUNTER — LABORATORY RESULT (OUTPATIENT)
Age: 82
End: 2022-03-09

## 2022-03-09 VITALS
SYSTOLIC BLOOD PRESSURE: 130 MMHG | DIASTOLIC BLOOD PRESSURE: 80 MMHG | OXYGEN SATURATION: 97 % | WEIGHT: 171 LBS | BODY MASS INDEX: 26.78 KG/M2 | HEART RATE: 73 BPM

## 2022-03-09 DIAGNOSIS — I71.4 ABDOMINAL AORTIC ANEURYSM, W/OUT RUPTURE: ICD-10-CM

## 2022-03-09 DIAGNOSIS — Z01.810 ENCOUNTER FOR PREPROCEDURAL CARDIOVASCULAR EXAMINATION: ICD-10-CM

## 2022-03-09 DIAGNOSIS — Z11.59 ENCOUNTER FOR SCREENING FOR OTHER VIRAL DISEASES: ICD-10-CM

## 2022-03-09 PROCEDURE — 99215 OFFICE O/P EST HI 40 MIN: CPT

## 2022-03-10 LAB
ABO + RH PNL BLD: NORMAL
ANION GAP SERPL CALC-SCNC: 13 MMOL/L
APPEARANCE: CLEAR
BASOPHILS # BLD AUTO: 0.06 K/UL
BASOPHILS NFR BLD AUTO: 0.8 %
BILIRUBIN URINE: NEGATIVE
BLD GP AB SCN SERPL QL: NORMAL
BLOOD URINE: NEGATIVE
BUN SERPL-MCNC: 20 MG/DL
CALCIUM SERPL-MCNC: 9.4 MG/DL
CHLORIDE SERPL-SCNC: 103 MMOL/L
CO2 SERPL-SCNC: 24 MMOL/L
COLOR: YELLOW
CREAT SERPL-MCNC: 1.17 MG/DL
EGFR: 63 ML/MIN/1.73M2
EOSINOPHIL # BLD AUTO: 0.14 K/UL
EOSINOPHIL NFR BLD AUTO: 1.8 %
GLUCOSE QUALITATIVE U: NEGATIVE
GLUCOSE SERPL-MCNC: 81 MG/DL
HCT VFR BLD CALC: 43.1 %
HGB BLD-MCNC: 13.8 G/DL
IMM GRANULOCYTES NFR BLD AUTO: 0.4 %
INR PPP: 1.15 RATIO
KETONES URINE: NEGATIVE
LEUKOCYTE ESTERASE URINE: NEGATIVE
LYMPHOCYTES # BLD AUTO: 1.76 K/UL
LYMPHOCYTES NFR BLD AUTO: 23.1 %
MAN DIFF?: NORMAL
MCHC RBC-ENTMCNC: 30.5 PG
MCHC RBC-ENTMCNC: 32 GM/DL
MCV RBC AUTO: 95.4 FL
MONOCYTES # BLD AUTO: 0.69 K/UL
MONOCYTES NFR BLD AUTO: 9 %
NEUTROPHILS # BLD AUTO: 4.95 K/UL
NEUTROPHILS NFR BLD AUTO: 64.9 %
NITRITE URINE: NEGATIVE
PH URINE: 6
PLATELET # BLD AUTO: 233 K/UL
POTASSIUM SERPL-SCNC: 4.6 MMOL/L
PROTEIN URINE: ABNORMAL
PT BLD: 13.5 SEC
RBC # BLD: 4.52 M/UL
RBC # FLD: 13.2 %
SODIUM SERPL-SCNC: 140 MMOL/L
SPECIFIC GRAVITY URINE: 1.02
UROBILINOGEN URINE: NORMAL
WBC # FLD AUTO: 7.63 K/UL

## 2022-03-10 NOTE — CARDIOLOGY SUMMARY
[de-identified] : 2/24/2018, sinus 86 bpm with left atrial enlargement, IVCD (LBBB like) \par 2/23/2022, normal sinus rhythm, 84 BPM, LAE, left axis, LBBB, poor RWP, unchanged from prior ECG>  [de-identified] : 1/17/2018, no Ischemia, normal MPI, LVEF 73% [de-identified] : 2/18/2018, moderate AS, LEAH 1.2 sqcm by continuity, severe LV dysfunction, normal LA size LVEF 25%. \par 4/20/2018, mild AS, LEAH 1.5 sqcm, mild-moderate AR, moderate global LV dysfunction, mildly enlarged LA, LVEF 40%\par 12/2/2019, moderate AS, LEAH 1.4 sqcm, mild-moderate AR, low normal LV systolic function, normal LA size, LVEF 50-55%  [de-identified] : 2/18/2018 by Nir Fofana MD at [facility], 95% prox-LAD, 90% D2, 90% OM1 \par Stent: 2/18/2018 RICHARDSON to prox-LAD, RICHARDSON to OM1, POBA to D2

## 2022-03-10 NOTE — REASON FOR VISIT
[Arrhythmia/ECG Abnorrmalities] : arrhythmia/ECG abnormalities [Hypertension] : hypertension [Coronary Artery Disease] : coronary artery disease [Other: ____] : [unfilled] [FreeTextEntry1] : 3/9/2022. \par Jamari Fuchs returns today for preprocedural cardiovascular evaluation prior to scheduled aortogram to monitor his abdominal aortic aneurysm with Pradip Cano MD at [facility] on 3/18/2022.\par He is seen today in his usual state of health and is accompanied by a family member.\par Aside from several recent falls, 2 of which this morning on the ice, he reports feeling well.\par He denies any exertional chest discomfort, shortness of breath, palpitations, lightheadedness or syncope.

## 2022-03-10 NOTE — DISCUSSION/SUMMARY
[FreeTextEntry1] : Patient is a very pleasant 81 year-old gentleman with history as above including RCC status post nephrectomy, AAA status post EVAR complicated by occlusion of remaining renal artery, initiation of HD, acute decompensated systolic heart failure secondary to ischemic cardiomyopathy, now status post PCI and subsequent revascularization of renal artery with interval improvement in renal function.\par Patient is maintaining sinus rhythm, and MCOT x3 weeks was negative for atrial fibrillation.\par \par Continue metoprolol succinate 100 mg daily for guideline based heart failure management for patient with reduced LVEF. For patient with rising blood pressure, would change to carvedilol 25 mg BID, then check renal function and follow-up with vascular surgeon regarding reassessing renal artery stent. \par \par We considered low dose ARB for heart failure management, and Dr. Stevens agreed, but patient and his daughter, Jennifer decided against it.\par \par Patient's CHADSVASc = 5.\par Patient started on apixaban 5 mg BID for CVA prevention. \par Patient has been taking apixaban 2.5 mg BID as directed.\par \par Repeat echocardiogram to monitor LV function is ordered.\par \par Follow-up with Dr. Stevens as scheduled.\par Encourage adequate hydration. \par \par In preparation for his upcoming angiography, he may hold his Eliquis for 3/16 and 3/17/2022 and will resume therapy once cleared by vascular surgery. Written instructions have been given to the patient.\par \par Preprocedural labs today to include CBC, BMP, PT/INR, type and screen and urinalysis.\par \par He is free from any acute coronary syndromes, decompensated heart failure, dangerous arrhythmias, or critical valvular stenosis and may proceed with the aortogram without any further testing required, \par \par Follow-up in 6 months or earlier as needed.
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
## 2022-04-04 ENCOUNTER — NON-APPOINTMENT (OUTPATIENT)
Age: 82
End: 2022-04-04

## 2022-04-04 ENCOUNTER — APPOINTMENT (OUTPATIENT)
Dept: CARDIOLOGY | Facility: CLINIC | Age: 82
End: 2022-04-04
Payer: MEDICARE

## 2022-04-04 VITALS
DIASTOLIC BLOOD PRESSURE: 69 MMHG | HEIGHT: 67 IN | SYSTOLIC BLOOD PRESSURE: 143 MMHG | WEIGHT: 171 LBS | BODY MASS INDEX: 26.84 KG/M2

## 2022-04-04 DIAGNOSIS — I25.5 ISCHEMIC CARDIOMYOPATHY: ICD-10-CM

## 2022-04-04 PROCEDURE — 93000 ELECTROCARDIOGRAM COMPLETE: CPT

## 2022-04-04 PROCEDURE — 99215 OFFICE O/P EST HI 40 MIN: CPT

## 2022-04-04 RX ORDER — ROSUVASTATIN CALCIUM 20 MG/1
20 TABLET, FILM COATED ORAL
Qty: 30 | Refills: 5 | Status: DISCONTINUED | COMMUNITY
Start: 2019-05-07 | End: 2022-04-04

## 2022-04-15 NOTE — DISCUSSION/SUMMARY
[FreeTextEntry1] : Patient is a very pleasant 81 year-old gentleman with history as above including RCC status post nephrectomy, AAA status post EVAR complicated by occlusion of remaining renal artery, initiation of HD, acute decompensated systolic heart failure secondary to ischemic cardiomyopathy, now status post PCI and subsequent revascularization of renal artery with interval improvement in renal function.\par Patient is maintaining sinus rhythm, and MCOT x3 weeks was negative for atrial fibrillation.\par \par Continue metoprolol succinate 100 mg daily for guideline based heart failure management for patient with reduced LVEF. For patient with rising blood pressure, would change to carvedilol 25 mg BID, then check renal function and follow-up with vascular surgeon regarding reassessing renal artery stent. Patient's daughter would prefer he remain on metoprolol. \par We considered low dose ARB for heart failure management, and Dr. Stevens agreed, but patient and his daughter, Jennifer decided against it.\par \par Patient's CHADSVASc = 5.\par Patient started on apixaban 5 mg BID for CVA prevention. His hair has stopped falling out and is growing back. Patient has been taking apixaban 2.5 mg daily (per patient's daughter, Jennifer's instructions). We discussed BID dosing, but patient's daughter refused. They are not interested in Xarelto either.\par Encourage AliveCor Kardia device or Apple Watch as alternatives for monitoring for AFib if anticoagulation is ultimately held.\par \par Follow-up with Dr. Stevens as scheduled.\par Encourage adequate hydration. \par \par Strongly encouraged vaccination against Covid-19.\par Follow-up in 6 months or earlier as needed.

## 2022-04-15 NOTE — CARDIOLOGY SUMMARY
[de-identified] : 2/24/2018, sinus 86 bpm with left atrial enlargement, IVCD (LBBB like)  [de-identified] : 1/17/2018, no Ischemia, normal MPI, LVEF 73% [de-identified] : 2/18/2018, moderate AS, LEAH 1.2 sqcm by continuity, severe LV dysfunction, normal LA size LVEF 25%. \par 4/20/2018, mild AS, LEAH 1.5 sqcm, mild-moderate AR, moderate global LV dysfunction, mildly enlarged LA, LVEF 40%\par 12/2/2019, moderate AS, LEAH 1.4 sqcm, mild-moderate AR, low normal LV systolic function, normal LA size, LVEF 50-55%  [de-identified] : 2/18/2018 by Nir Fofana MD at [facility], 95% prox-LAD, 90% D2, 90% OM1 \par Stent: 2/18/2018 RICHARDSON to prox-LAD, RICHARDSON to OM1, POBA to D2

## 2022-04-15 NOTE — REASON FOR VISIT
[Arrhythmia/ECG Abnorrmalities] : arrhythmia/ECG abnormalities [Hypertension] : hypertension [Coronary Artery Disease] : coronary artery disease [FreeTextEntry1] : April 2022 - Patient returns today for follow-up. On 3/18/2022, he had an aortogram, endoleak repair, coil embolization, and stent placement with Dr. Pradip Cano at [facility]. He was discharged on triple therapy with ASA, clopidogrel, and apixaban 2.5 mg BID. He reports that since the procedure, he is having easier bowel movements and urination. \par He continues walking for exercise.
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
## 2022-07-11 ENCOUNTER — APPOINTMENT (OUTPATIENT)
Dept: CARDIOLOGY | Facility: CLINIC | Age: 82
End: 2022-07-11

## 2022-08-22 ENCOUNTER — RX RENEWAL (OUTPATIENT)
Age: 82
End: 2022-08-22

## 2022-12-12 NOTE — PROGRESS NOTE ADULT - PROBLEM/PLAN-6
DISPLAY PLAN FREE TEXT
Attending Only
On-site Attending supervising JEREMY (99XXX codes)

## 2023-03-05 ENCOUNTER — NON-APPOINTMENT (OUTPATIENT)
Age: 83
End: 2023-03-05

## 2023-04-12 ENCOUNTER — APPOINTMENT (OUTPATIENT)
Dept: CARDIOLOGY | Facility: CLINIC | Age: 83
End: 2023-04-12
Payer: MEDICARE

## 2023-04-12 ENCOUNTER — NON-APPOINTMENT (OUTPATIENT)
Age: 83
End: 2023-04-12

## 2023-04-12 VITALS
HEART RATE: 70 BPM | BODY MASS INDEX: 24.43 KG/M2 | OXYGEN SATURATION: 100 % | WEIGHT: 156 LBS | DIASTOLIC BLOOD PRESSURE: 83 MMHG | SYSTOLIC BLOOD PRESSURE: 146 MMHG

## 2023-04-12 DIAGNOSIS — I48.0 PAROXYSMAL ATRIAL FIBRILLATION: ICD-10-CM

## 2023-04-12 DIAGNOSIS — I25.10 ATHEROSCLEROTIC HEART DISEASE OF NATIVE CORONARY ARTERY W/OUT ANGINA PECTORIS: ICD-10-CM

## 2023-04-12 DIAGNOSIS — I10 ESSENTIAL (PRIMARY) HYPERTENSION: ICD-10-CM

## 2023-04-12 PROCEDURE — 99215 OFFICE O/P EST HI 40 MIN: CPT

## 2023-04-12 PROCEDURE — 93000 ELECTROCARDIOGRAM COMPLETE: CPT

## 2023-04-12 RX ORDER — ATORVASTATIN CALCIUM 80 MG/1
80 TABLET, FILM COATED ORAL
Qty: 90 | Refills: 3 | Status: ACTIVE | COMMUNITY
Start: 2022-04-04 | End: 1900-01-01

## 2023-04-12 RX ORDER — CLOPIDOGREL BISULFATE 75 MG/1
75 TABLET, FILM COATED ORAL
Qty: 30 | Refills: 11 | Status: ACTIVE | COMMUNITY
Start: 2018-04-23 | End: 1900-01-01

## 2023-04-12 RX ORDER — APIXABAN 2.5 MG/1
2.5 TABLET, FILM COATED ORAL
Qty: 180 | Refills: 1 | Status: ACTIVE | COMMUNITY
Start: 2018-05-30 | End: 1900-01-01

## 2023-04-13 LAB
ALBUMIN SERPL ELPH-MCNC: 4.5 G/DL
ALP BLD-CCNC: 137 U/L
ALT SERPL-CCNC: 13 U/L
ANION GAP SERPL CALC-SCNC: 12 MMOL/L
AST SERPL-CCNC: 12 U/L
BASOPHILS # BLD AUTO: 0.04 K/UL
BASOPHILS NFR BLD AUTO: 0.5 %
BILIRUB SERPL-MCNC: 0.8 MG/DL
BUN SERPL-MCNC: 16 MG/DL
CALCIUM SERPL-MCNC: 10.2 MG/DL
CHLORIDE SERPL-SCNC: 101 MMOL/L
CHOLEST SERPL-MCNC: 131 MG/DL
CO2 SERPL-SCNC: 25 MMOL/L
CREAT SERPL-MCNC: 1.19 MG/DL
EGFR: 61 ML/MIN/1.73M2
EOSINOPHIL # BLD AUTO: 0.11 K/UL
EOSINOPHIL NFR BLD AUTO: 1.3 %
ESTIMATED AVERAGE GLUCOSE: 111 MG/DL
GLUCOSE SERPL-MCNC: 110 MG/DL
HBA1C MFR BLD HPLC: 5.5 %
HCT VFR BLD CALC: 41.1 %
HDLC SERPL-MCNC: 30 MG/DL
HGB BLD-MCNC: 13.5 G/DL
IMM GRANULOCYTES NFR BLD AUTO: 0.4 %
LDLC SERPL CALC-MCNC: 74 MG/DL
LYMPHOCYTES # BLD AUTO: 1.83 K/UL
LYMPHOCYTES NFR BLD AUTO: 21.4 %
MAN DIFF?: NORMAL
MCHC RBC-ENTMCNC: 31.6 PG
MCHC RBC-ENTMCNC: 32.8 GM/DL
MCV RBC AUTO: 96.3 FL
MONOCYTES # BLD AUTO: 0.75 K/UL
MONOCYTES NFR BLD AUTO: 8.8 %
NEUTROPHILS # BLD AUTO: 5.79 K/UL
NEUTROPHILS NFR BLD AUTO: 67.6 %
NONHDLC SERPL-MCNC: 101 MG/DL
PLATELET # BLD AUTO: 217 K/UL
POTASSIUM SERPL-SCNC: 4.7 MMOL/L
PROT SERPL-MCNC: 7.3 G/DL
PSA FREE FLD-MCNC: 39 %
PSA FREE SERPL-MCNC: 0.19 NG/ML
PSA SERPL-MCNC: 0.49 NG/ML
RBC # BLD: 4.27 M/UL
RBC # FLD: 12.9 %
SODIUM SERPL-SCNC: 138 MMOL/L
T4 SERPL-MCNC: 6.5 UG/DL
TRIGL SERPL-MCNC: 137 MG/DL
TSH SERPL-ACNC: 5.17 UIU/ML
WBC # FLD AUTO: 8.55 K/UL

## 2023-04-14 NOTE — DISCUSSION/SUMMARY
[EKG obtained to assist in diagnosis and management of assessed problem(s)] : EKG obtained to assist in diagnosis and management of assessed problem(s) [FreeTextEntry1] : Patient is a very pleasant 81 year-old gentleman with history as above including RCC status post nephrectomy, AAA status post EVAR complicated by occlusion of remaining renal artery, initiation of HD, acute decompensated systolic heart failure secondary to ischemic cardiomyopathy, now status post PCI and subsequent revascularization of renal artery with interval improvement in renal function.\par Patient is maintaining sinus rhythm, and MCOT x3 weeks was negative for atrial fibrillation.\par \par Continue metoprolol succinate 100 mg daily for guideline based heart failure management for patient with reduced LVEF. For patient with rising blood pressure, would change to carvedilol 25 mg BID, then check renal function and follow-up with vascular surgeon regarding reassessing renal artery stent. Patient's daughter would prefer he remain on metoprolol. \par We considered low dose ARB for heart failure management, and Dr. Stevens agreed, but patient and his daughter, Jennifer decided against it.\par \par Patient's CHADSVASc = 5.\par Patient started on apixaban 5 mg BID for CVA prevention. His hair has stopped falling out and is growing back. Patient has been taking apixaban 2.5 mg daily (per patient's daughter, Jennifer's instructions). We discussed BID dosing, but patient's daughter refused. They are not interested in Xarelto either.\par Encourage AliveCor Kardia device or Apple Watch as alternatives for monitoring for AFib if anticoagulation is ultimately held.\par \par Follow-up with Dr. Stevens as scheduled.\par Labs today with further recommendations thereafter.\par I have sent refills of his medications to his preferred pharmacy.\par \par Follow-up in 6 months or earlier as needed.

## 2023-04-14 NOTE — CARDIOLOGY SUMMARY
[de-identified] : 2/24/2018, sinus 86 bpm with left atrial enlargement, IVCD (LBBB like)  [de-identified] : 1/17/2018, no Ischemia, normal MPI, LVEF 73% [de-identified] : 2/18/2018, moderate AS, LEAH 1.2 sqcm by continuity, severe LV dysfunction, normal LA size LVEF 25%. \par 4/20/2018, mild AS, LEAH 1.5 sqcm, mild-moderate AR, moderate global LV dysfunction, mildly enlarged LA, LVEF 40%\par 12/2/2019, moderate AS, LEAH 1.4 sqcm, mild-moderate AR, low normal LV systolic function, normal LA size, LVEF 50-55%  [de-identified] : 2/18/2018 by Nir Fofana MD at [facility], 95% prox-LAD, 90% D2, 90% OM1 \par Stent: 2/18/2018 RICHARDSON to prox-LAD, RICHARDSON to OM1, POBA to D2

## 2023-04-14 NOTE — REASON FOR VISIT
[Arrhythmia/ECG Abnorrmalities] : arrhythmia/ECG abnormalities [Hypertension] : hypertension [Coronary Artery Disease] : coronary artery disease [FreeTextEntry1] : 4/12/2023\par Jamari Fuchs returns today for scheduled follow up. He has not been seen in the office in just over a year. He continues to feel well and reports that he has been increasingly active. He is seen today down 15 pounds which he contributes to increased physical activity (walking 2-5 miles almost daily and lifting light weights) and watching his diet. With activity he feels well and denies any chest discomfort, shortness of breath, palpitations, lightheadedness or syncope.\par  \par He has been only taking metoprolol succinate 100 mg daily. No other medications.
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
## 2023-05-11 NOTE — PROGRESS NOTE ADULT - ASSESSMENT
Behavioral Health Psychotherapy Progress Note    Psychotherapy Provided: Individual Psychotherapy     1  Mild episode of recurrent major depressive disorder ([address] )        2  Cigarette nicotine dependence without complication        3  Cannabis abuse            Goals addressed in session: Goal 1     DATA:  Normal dress and groom  Doctor updated her medicine to 100mg  Daniela Paz states she has never been this happy in her entire life  Zoloft 100mg 1x per day  She reports feeling tired more often now as a negative side-effect  Started dating a simone named Star; processed her relationship with this person  Prom is this weekend  Jessica plans to smoke cannabis and drink alcohol at a party and we discussed dangers of driving and consuming drugs and alcohol  Daniela Paz was cautioned to not consume alcohol while using mental health medication  Daniela Paz stated she has a sober friend who is going to chaperone and be with her the entire night, named Sarai  Did multiple self-love activities, nails, hair, clothing, fun things  She is hoping her happiness lasts longer than a week or 2  She wants this happiness to last longer  She uped the dose of medicine about 3 weeks ago  She also reports that she feels checking and other mental health anxiety has gone down, she is only locking her door 2x versus 17x! Daniela Paz feels really good that things are going well for her right now! She is keeping the good vibes going! During this session, this clinician used the following therapeutic modalities: Client-centered Therapy and Cognitive Behavioral Therapy    Substance Abuse was addressed during this session  If the client is diagnosed with a co-occurring substance use disorder, please indicate any changes in the frequency or amount of use: no use at this time, social, planned more social use       Stage of change for addressing substance use diagnoses: Pre-contemplation    ASSESSMENT:  Goldy Emerson presents with a Euthymic/ "normal mood  her affect is Normal range and intensity, which is congruent, with her mood and the content of the session  The client has made progress on their goals  Shantelle Chen presents with a none risk of suicide, none risk of self-harm, and none risk of harm to others  For any risk assessment that surpasses a \"low\" rating, a safety plan must be developed  A safety plan was indicated: no  If yes, describe in detail n/a    PLAN: Between sessions, Shantelle Chen will continue to engage in fun activities  At the next session, the therapist will use Client-centered Therapy, Cognitive Behavioral Therapy and Cognitive Processing Therapy to address anxiety / feelings of depression  Behavioral Health Treatment Plan and Discharge Planning: Shantelle Chen is aware of and agrees to continue to work on their treatment plan  They have identified and are working toward their discharge goals   yes    Visit start and stop times:    05/11/23  Start Time: 0437  Stop Time: 1457  Total Visit Time: 50 minutes  " 77M history of AAA 5.5cm s/p EVAAR repair with stents on 1/29 with incidental finding of Right Renal Neoplasm, previous history of Left Renal Neoplasm with Left Nephrectomy in 2008, Bladder Cancer, Known LBBB, HTN, HLD transferred from [facility] with acute respiratory failure requiring BiPAP and admitted with afib with RVR and acute HFpEF requiring CCU stay, course c/b acute thrombocytopenia and patient found to be HIT Ab positive, hematology consulted for anticoagulation recommendations

## 2023-06-26 ENCOUNTER — RX RENEWAL (OUTPATIENT)
Age: 83
End: 2023-06-26

## 2023-08-10 NOTE — PROGRESS NOTE ADULT - ASSESSMENT
77 year old man with past medical history of AAA 5.5cm s/p EVAAR repair with stents on 1/29 with incidental finding of Right Renal Neoplasm, previous history of Left Renal Neoplasm with Left Nephrectomy in 2008, Bladder Cancer, Known LBBB, HTN, HLD transferred from [facility] after initially presenting with SOB.     His course during this hospitalization was complicated with hypoxic respiratory failure secondary to pulmonary edema, ADHF, multifocal PNA, DONNIE requiring CVVHDF. He was started on heparin drip due to elevated troponins and ST-changes. 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 Found to have severe renal artery stenosis and Triple vessel disease on cath, PCI planned after renal artery intervention. He developed Afib with RVR: new onset , however, renal artery could not be engaged. Patient has been on treatment for PNA and completed 10 days of vanco/ aztreonam. Leukocytosis improved. Remains afebrile. Has an allergy to PCN, but does not remember the reaction. States it was a long time ago. Also, does not remember reactions to cipro/ levaquin. All bld cxs NGTD.    Recommend:  -Multifocal PNA on day 10. Continue to monitor off antibiotics.  -Patient s/p permacath placement today.    Will sign off. Please call with questions.    Rico Bills MD  Pager [phone]  After 5pm/weekends call [phone] Fluconazole Counseling:  Patient counseled regarding adverse effects of fluconazole including but not limited to headache, diarrhea, nausea, upset stomach, liver function test abnormalities, taste disturbance, and stomach pain.  There is a rare possibility of liver failure that can occur when taking fluconazole.  The patient understands that monitoring of LFTs and kidney function test may be required, especially at baseline. The patient verbalized understanding of the proper use and possible adverse effects of fluconazole.  All of the patient's questions and concerns were addressed.

## 2023-09-29 NOTE — ED ADULT NURSE NOTE - CAS EDN DISCHARGE ASSESSMENT
This is no longer an [facility] patient, refused back to pharmacy.     Thank you Refill Center     
Alert and oriented to person, place and time

## 2023-10-15 ENCOUNTER — RX RENEWAL (OUTPATIENT)
Age: 83
End: 2023-10-15

## 2023-10-20 ENCOUNTER — RX RENEWAL (OUTPATIENT)
Age: 83
End: 2023-10-20

## 2023-10-20 RX ORDER — METOPROLOL SUCCINATE 100 MG/1
100 TABLET, EXTENDED RELEASE ORAL
Qty: 30 | Refills: 2 | Status: ACTIVE | COMMUNITY
Start: 2018-04-24 | End: 1900-01-01

## 2023-10-20 RX ORDER — AMLODIPINE BESYLATE 2.5 MG/1
2.5 TABLET ORAL
Qty: 90 | Refills: 0 | Status: ACTIVE | COMMUNITY
Start: 2022-03-19 | End: 1900-01-01

## 2023-11-09 ENCOUNTER — TRANSCRIPTION ENCOUNTER (OUTPATIENT)
Age: 83
End: 2023-11-09

## 2023-11-10 ENCOUNTER — RESULT REVIEW (OUTPATIENT)
Age: 83
End: 2023-11-10

## 2023-11-10 ENCOUNTER — EMERGENCY (EMERGENCY)
Facility: HOSPITAL | Age: 83
LOS: 1 days | Discharge: ANOTHER HOSPITAL | End: 2023-11-10
Attending: EMERGENCY MEDICINE | Admitting: EMERGENCY MEDICINE
Payer: MEDICARE

## 2023-11-10 ENCOUNTER — TRANSCRIPTION ENCOUNTER (OUTPATIENT)
Age: 83
End: 2023-11-10

## 2023-11-10 ENCOUNTER — INPATIENT (INPATIENT)
Facility: HOSPITAL | Age: 83
LOS: 62 days | Discharge: NURSING, REHAB OR LONG-TERM CARE | DRG: 23 | End: 2024-01-12
Attending: STUDENT IN AN ORGANIZED HEALTH CARE EDUCATION/TRAINING PROGRAM | Admitting: STUDENT IN AN ORGANIZED HEALTH CARE EDUCATION/TRAINING PROGRAM
Payer: MEDICARE

## 2023-11-10 VITALS
OXYGEN SATURATION: 100 % | WEIGHT: 176.37 LBS | SYSTOLIC BLOOD PRESSURE: 149 MMHG | HEART RATE: 72 BPM | HEIGHT: 67 IN | TEMPERATURE: 96 F | DIASTOLIC BLOOD PRESSURE: 75 MMHG | RESPIRATION RATE: 22 BRPM

## 2023-11-10 VITALS
RESPIRATION RATE: 16 BRPM | SYSTOLIC BLOOD PRESSURE: 159 MMHG | OXYGEN SATURATION: 98 % | DIASTOLIC BLOOD PRESSURE: 80 MMHG | HEART RATE: 88 BPM

## 2023-11-10 VITALS
RESPIRATION RATE: 15 BRPM | HEART RATE: 85 BPM | OXYGEN SATURATION: 97 % | DIASTOLIC BLOOD PRESSURE: 88 MMHG | WEIGHT: 149.91 LBS | TEMPERATURE: 98 F | HEIGHT: 67 IN | SYSTOLIC BLOOD PRESSURE: 193 MMHG

## 2023-11-10 DIAGNOSIS — Z98.89 OTHER SPECIFIED POSTPROCEDURAL STATES: Chronic | ICD-10-CM

## 2023-11-10 DIAGNOSIS — Z95.5 PRESENCE OF CORONARY ANGIOPLASTY IMPLANT AND GRAFT: Chronic | ICD-10-CM

## 2023-11-10 DIAGNOSIS — Z98.890 OTHER SPECIFIED POSTPROCEDURAL STATES: Chronic | ICD-10-CM

## 2023-11-10 DIAGNOSIS — I61.9 NONTRAUMATIC INTRACEREBRAL HEMORRHAGE, UNSPECIFIED: ICD-10-CM

## 2023-11-10 LAB
ALBUMIN SERPL ELPH-MCNC: 3.2 G/DL — LOW (ref 3.3–5.2)
ALBUMIN SERPL ELPH-MCNC: 3.2 G/DL — LOW (ref 3.3–5.2)
ALBUMIN SERPL ELPH-MCNC: 3.5 G/DL — SIGNIFICANT CHANGE UP (ref 3.3–5.2)
ALBUMIN SERPL ELPH-MCNC: 3.5 G/DL — SIGNIFICANT CHANGE UP (ref 3.3–5.2)
ALBUMIN SERPL ELPH-MCNC: 3.6 G/DL — SIGNIFICANT CHANGE UP (ref 3.3–5)
ALBUMIN SERPL ELPH-MCNC: 3.6 G/DL — SIGNIFICANT CHANGE UP (ref 3.3–5)
ALP SERPL-CCNC: 75 U/L — SIGNIFICANT CHANGE UP (ref 40–120)
ALP SERPL-CCNC: 75 U/L — SIGNIFICANT CHANGE UP (ref 40–120)
ALP SERPL-CCNC: 89 U/L — SIGNIFICANT CHANGE UP (ref 40–120)
ALP SERPL-CCNC: 89 U/L — SIGNIFICANT CHANGE UP (ref 40–120)
ALP SERPL-CCNC: 99 U/L — SIGNIFICANT CHANGE UP (ref 30–120)
ALP SERPL-CCNC: 99 U/L — SIGNIFICANT CHANGE UP (ref 30–120)
ALT FLD-CCNC: 10 U/L — SIGNIFICANT CHANGE UP
ALT FLD-CCNC: 10 U/L — SIGNIFICANT CHANGE UP
ALT FLD-CCNC: 12 U/L — SIGNIFICANT CHANGE UP
ALT FLD-CCNC: 12 U/L — SIGNIFICANT CHANGE UP
ALT FLD-CCNC: 20 U/L — SIGNIFICANT CHANGE UP (ref 10–60)
ALT FLD-CCNC: 20 U/L — SIGNIFICANT CHANGE UP (ref 10–60)
ANION GAP SERPL CALC-SCNC: 13 MMOL/L — SIGNIFICANT CHANGE UP (ref 5–17)
ANION GAP SERPL CALC-SCNC: 13 MMOL/L — SIGNIFICANT CHANGE UP (ref 5–17)
ANION GAP SERPL CALC-SCNC: 14 MMOL/L — SIGNIFICANT CHANGE UP (ref 5–17)
ANION GAP SERPL CALC-SCNC: 14 MMOL/L — SIGNIFICANT CHANGE UP (ref 5–17)
ANION GAP SERPL CALC-SCNC: 6 MMOL/L — SIGNIFICANT CHANGE UP (ref 5–17)
ANION GAP SERPL CALC-SCNC: 6 MMOL/L — SIGNIFICANT CHANGE UP (ref 5–17)
APTT BLD: 30.8 SEC — SIGNIFICANT CHANGE UP (ref 24.5–35.6)
APTT BLD: 30.8 SEC — SIGNIFICANT CHANGE UP (ref 24.5–35.6)
APTT BLD: 32.3 SEC — SIGNIFICANT CHANGE UP (ref 24.5–35.6)
APTT BLD: 32.3 SEC — SIGNIFICANT CHANGE UP (ref 24.5–35.6)
AST SERPL-CCNC: 13 U/L — SIGNIFICANT CHANGE UP
AST SERPL-CCNC: 13 U/L — SIGNIFICANT CHANGE UP
AST SERPL-CCNC: 16 U/L — SIGNIFICANT CHANGE UP
AST SERPL-CCNC: 16 U/L — SIGNIFICANT CHANGE UP
AST SERPL-CCNC: 25 U/L — SIGNIFICANT CHANGE UP (ref 10–40)
AST SERPL-CCNC: 25 U/L — SIGNIFICANT CHANGE UP (ref 10–40)
BASOPHILS # BLD AUTO: 0.04 K/UL — SIGNIFICANT CHANGE UP (ref 0–0.2)
BASOPHILS # BLD AUTO: 0.04 K/UL — SIGNIFICANT CHANGE UP (ref 0–0.2)
BASOPHILS NFR BLD AUTO: 0.5 % — SIGNIFICANT CHANGE UP (ref 0–2)
BASOPHILS NFR BLD AUTO: 0.5 % — SIGNIFICANT CHANGE UP (ref 0–2)
BILIRUB SERPL-MCNC: 0.6 MG/DL — SIGNIFICANT CHANGE UP (ref 0.4–2)
BILIRUB SERPL-MCNC: 0.6 MG/DL — SIGNIFICANT CHANGE UP (ref 0.4–2)
BILIRUB SERPL-MCNC: 0.7 MG/DL — SIGNIFICANT CHANGE UP (ref 0.2–1.2)
BILIRUB SERPL-MCNC: 0.7 MG/DL — SIGNIFICANT CHANGE UP (ref 0.2–1.2)
BILIRUB SERPL-MCNC: 1 MG/DL — SIGNIFICANT CHANGE UP (ref 0.4–2)
BILIRUB SERPL-MCNC: 1 MG/DL — SIGNIFICANT CHANGE UP (ref 0.4–2)
BLD GP AB SCN SERPL QL: SIGNIFICANT CHANGE UP
BLD GP AB SCN SERPL QL: SIGNIFICANT CHANGE UP
BUN SERPL-MCNC: 15.8 MG/DL — SIGNIFICANT CHANGE UP (ref 8–20)
BUN SERPL-MCNC: 15.8 MG/DL — SIGNIFICANT CHANGE UP (ref 8–20)
BUN SERPL-MCNC: 22 MG/DL — SIGNIFICANT CHANGE UP (ref 7–23)
BUN SERPL-MCNC: 22 MG/DL — SIGNIFICANT CHANGE UP (ref 7–23)
BUN SERPL-MCNC: 22.1 MG/DL — HIGH (ref 8–20)
BUN SERPL-MCNC: 22.1 MG/DL — HIGH (ref 8–20)
CALCIUM SERPL-MCNC: 6.5 MG/DL — CRITICAL LOW (ref 8.4–10.5)
CALCIUM SERPL-MCNC: 6.5 MG/DL — CRITICAL LOW (ref 8.4–10.5)
CALCIUM SERPL-MCNC: 8.6 MG/DL — SIGNIFICANT CHANGE UP (ref 8.4–10.5)
CALCIUM SERPL-MCNC: 8.6 MG/DL — SIGNIFICANT CHANGE UP (ref 8.4–10.5)
CALCIUM SERPL-MCNC: 9 MG/DL — SIGNIFICANT CHANGE UP (ref 8.4–10.5)
CALCIUM SERPL-MCNC: 9 MG/DL — SIGNIFICANT CHANGE UP (ref 8.4–10.5)
CHLORIDE SERPL-SCNC: 102 MMOL/L — SIGNIFICANT CHANGE UP (ref 96–108)
CHLORIDE SERPL-SCNC: 107 MMOL/L — SIGNIFICANT CHANGE UP (ref 96–108)
CHLORIDE SERPL-SCNC: 107 MMOL/L — SIGNIFICANT CHANGE UP (ref 96–108)
CK SERPL-CCNC: 87 U/L — SIGNIFICANT CHANGE UP (ref 30–200)
CK SERPL-CCNC: 87 U/L — SIGNIFICANT CHANGE UP (ref 30–200)
CO2 SERPL-SCNC: 18 MMOL/L — LOW (ref 22–29)
CO2 SERPL-SCNC: 18 MMOL/L — LOW (ref 22–29)
CO2 SERPL-SCNC: 19 MMOL/L — LOW (ref 22–29)
CO2 SERPL-SCNC: 19 MMOL/L — LOW (ref 22–29)
CO2 SERPL-SCNC: 29 MMOL/L — SIGNIFICANT CHANGE UP (ref 22–31)
CO2 SERPL-SCNC: 29 MMOL/L — SIGNIFICANT CHANGE UP (ref 22–31)
CREAT SERPL-MCNC: 0.84 MG/DL — SIGNIFICANT CHANGE UP (ref 0.5–1.3)
CREAT SERPL-MCNC: 0.84 MG/DL — SIGNIFICANT CHANGE UP (ref 0.5–1.3)
CREAT SERPL-MCNC: 1.23 MG/DL — SIGNIFICANT CHANGE UP (ref 0.5–1.3)
CREAT SERPL-MCNC: 1.23 MG/DL — SIGNIFICANT CHANGE UP (ref 0.5–1.3)
CREAT SERPL-MCNC: 1.48 MG/DL — HIGH (ref 0.5–1.3)
CREAT SERPL-MCNC: 1.48 MG/DL — HIGH (ref 0.5–1.3)
EGFR: 47 ML/MIN/1.73M2 — LOW
EGFR: 47 ML/MIN/1.73M2 — LOW
EGFR: 59 ML/MIN/1.73M2 — LOW
EGFR: 59 ML/MIN/1.73M2 — LOW
EGFR: 88 ML/MIN/1.73M2 — SIGNIFICANT CHANGE UP
EGFR: 88 ML/MIN/1.73M2 — SIGNIFICANT CHANGE UP
EOSINOPHIL # BLD AUTO: 0.1 K/UL — SIGNIFICANT CHANGE UP (ref 0–0.5)
EOSINOPHIL # BLD AUTO: 0.1 K/UL — SIGNIFICANT CHANGE UP (ref 0–0.5)
EOSINOPHIL NFR BLD AUTO: 1.2 % — SIGNIFICANT CHANGE UP (ref 0–6)
EOSINOPHIL NFR BLD AUTO: 1.2 % — SIGNIFICANT CHANGE UP (ref 0–6)
ETHANOL SERPL-MCNC: <10 MG/DL — SIGNIFICANT CHANGE UP (ref 0–9)
GAS PNL BLDA: SIGNIFICANT CHANGE UP
GAS PNL BLDA: SIGNIFICANT CHANGE UP
GLUCOSE BLDC GLUCOMTR-MCNC: 103 MG/DL — HIGH (ref 70–99)
GLUCOSE BLDC GLUCOMTR-MCNC: 103 MG/DL — HIGH (ref 70–99)
GLUCOSE SERPL-MCNC: 115 MG/DL — HIGH (ref 70–99)
GLUCOSE SERPL-MCNC: 184 MG/DL — HIGH (ref 70–99)
GLUCOSE SERPL-MCNC: 184 MG/DL — HIGH (ref 70–99)
HCT VFR BLD CALC: 23.9 % — LOW (ref 39–50)
HCT VFR BLD CALC: 23.9 % — LOW (ref 39–50)
HCT VFR BLD CALC: 34.7 % — LOW (ref 39–50)
HCT VFR BLD CALC: 34.7 % — LOW (ref 39–50)
HCT VFR BLD CALC: 39.1 % — SIGNIFICANT CHANGE UP (ref 39–50)
HCT VFR BLD CALC: 39.1 % — SIGNIFICANT CHANGE UP (ref 39–50)
HGB BLD-MCNC: 11.9 G/DL — LOW (ref 13–17)
HGB BLD-MCNC: 11.9 G/DL — LOW (ref 13–17)
HGB BLD-MCNC: 13.1 G/DL — SIGNIFICANT CHANGE UP (ref 13–17)
HGB BLD-MCNC: 13.1 G/DL — SIGNIFICANT CHANGE UP (ref 13–17)
HGB BLD-MCNC: 8.4 G/DL — LOW (ref 13–17)
HGB BLD-MCNC: 8.4 G/DL — LOW (ref 13–17)
IMM GRANULOCYTES NFR BLD AUTO: 0.4 % — SIGNIFICANT CHANGE UP (ref 0–0.9)
IMM GRANULOCYTES NFR BLD AUTO: 0.4 % — SIGNIFICANT CHANGE UP (ref 0–0.9)
INR BLD: 1.04 RATIO — SIGNIFICANT CHANGE UP (ref 0.85–1.18)
INR BLD: 1.04 RATIO — SIGNIFICANT CHANGE UP (ref 0.85–1.18)
INR BLD: 1.06 RATIO — SIGNIFICANT CHANGE UP (ref 0.85–1.18)
INR BLD: 1.06 RATIO — SIGNIFICANT CHANGE UP (ref 0.85–1.18)
LACTATE SERPL-SCNC: 1 MMOL/L — SIGNIFICANT CHANGE UP (ref 0.5–2)
LACTATE SERPL-SCNC: 1 MMOL/L — SIGNIFICANT CHANGE UP (ref 0.5–2)
LYMPHOCYTES # BLD AUTO: 1.47 K/UL — SIGNIFICANT CHANGE UP (ref 1–3.3)
LYMPHOCYTES # BLD AUTO: 1.47 K/UL — SIGNIFICANT CHANGE UP (ref 1–3.3)
LYMPHOCYTES # BLD AUTO: 18 % — SIGNIFICANT CHANGE UP (ref 13–44)
LYMPHOCYTES # BLD AUTO: 18 % — SIGNIFICANT CHANGE UP (ref 13–44)
MAGNESIUM SERPL-MCNC: 1.1 MG/DL — LOW (ref 1.6–2.6)
MAGNESIUM SERPL-MCNC: 1.1 MG/DL — LOW (ref 1.6–2.6)
MAGNESIUM SERPL-MCNC: 1.4 MG/DL — LOW (ref 1.6–2.6)
MAGNESIUM SERPL-MCNC: 1.4 MG/DL — LOW (ref 1.6–2.6)
MCHC RBC-ENTMCNC: 30.6 PG — SIGNIFICANT CHANGE UP (ref 27–34)
MCHC RBC-ENTMCNC: 30.6 PG — SIGNIFICANT CHANGE UP (ref 27–34)
MCHC RBC-ENTMCNC: 30.7 PG — SIGNIFICANT CHANGE UP (ref 27–34)
MCHC RBC-ENTMCNC: 30.7 PG — SIGNIFICANT CHANGE UP (ref 27–34)
MCHC RBC-ENTMCNC: 31.9 PG — SIGNIFICANT CHANGE UP (ref 27–34)
MCHC RBC-ENTMCNC: 31.9 PG — SIGNIFICANT CHANGE UP (ref 27–34)
MCHC RBC-ENTMCNC: 33.5 GM/DL — SIGNIFICANT CHANGE UP (ref 32–36)
MCHC RBC-ENTMCNC: 33.5 GM/DL — SIGNIFICANT CHANGE UP (ref 32–36)
MCHC RBC-ENTMCNC: 34.3 GM/DL — SIGNIFICANT CHANGE UP (ref 32–36)
MCHC RBC-ENTMCNC: 34.3 GM/DL — SIGNIFICANT CHANGE UP (ref 32–36)
MCHC RBC-ENTMCNC: 35.1 GM/DL — SIGNIFICANT CHANGE UP (ref 32–36)
MCHC RBC-ENTMCNC: 35.1 GM/DL — SIGNIFICANT CHANGE UP (ref 32–36)
MCV RBC AUTO: 89.4 FL — SIGNIFICANT CHANGE UP (ref 80–100)
MCV RBC AUTO: 89.4 FL — SIGNIFICANT CHANGE UP (ref 80–100)
MCV RBC AUTO: 90.9 FL — SIGNIFICANT CHANGE UP (ref 80–100)
MCV RBC AUTO: 90.9 FL — SIGNIFICANT CHANGE UP (ref 80–100)
MCV RBC AUTO: 91.4 FL — SIGNIFICANT CHANGE UP (ref 80–100)
MCV RBC AUTO: 91.4 FL — SIGNIFICANT CHANGE UP (ref 80–100)
MONOCYTES # BLD AUTO: 0.62 K/UL — SIGNIFICANT CHANGE UP (ref 0–0.9)
MONOCYTES # BLD AUTO: 0.62 K/UL — SIGNIFICANT CHANGE UP (ref 0–0.9)
MONOCYTES NFR BLD AUTO: 7.6 % — SIGNIFICANT CHANGE UP (ref 2–14)
MONOCYTES NFR BLD AUTO: 7.6 % — SIGNIFICANT CHANGE UP (ref 2–14)
NEUTROPHILS # BLD AUTO: 5.9 K/UL — SIGNIFICANT CHANGE UP (ref 1.8–7.4)
NEUTROPHILS # BLD AUTO: 5.9 K/UL — SIGNIFICANT CHANGE UP (ref 1.8–7.4)
NEUTROPHILS NFR BLD AUTO: 72.3 % — SIGNIFICANT CHANGE UP (ref 43–77)
NEUTROPHILS NFR BLD AUTO: 72.3 % — SIGNIFICANT CHANGE UP (ref 43–77)
NRBC # BLD: 0 /100 WBCS — SIGNIFICANT CHANGE UP (ref 0–0)
NRBC # BLD: 0 /100 WBCS — SIGNIFICANT CHANGE UP (ref 0–0)
PHOSPHATE SERPL-MCNC: 2 MG/DL — LOW (ref 2.4–4.7)
PHOSPHATE SERPL-MCNC: 2 MG/DL — LOW (ref 2.4–4.7)
PLATELET # BLD AUTO: 139 K/UL — LOW (ref 150–400)
PLATELET # BLD AUTO: 139 K/UL — LOW (ref 150–400)
PLATELET # BLD AUTO: 169 K/UL — SIGNIFICANT CHANGE UP (ref 150–400)
PLATELET # BLD AUTO: 169 K/UL — SIGNIFICANT CHANGE UP (ref 150–400)
PLATELET # BLD AUTO: 95 K/UL — LOW (ref 150–400)
PLATELET # BLD AUTO: 95 K/UL — LOW (ref 150–400)
POTASSIUM SERPL-MCNC: 3.5 MMOL/L — SIGNIFICANT CHANGE UP (ref 3.5–5.3)
POTASSIUM SERPL-MCNC: 3.5 MMOL/L — SIGNIFICANT CHANGE UP (ref 3.5–5.3)
POTASSIUM SERPL-MCNC: 4.2 MMOL/L — SIGNIFICANT CHANGE UP (ref 3.5–5.3)
POTASSIUM SERPL-MCNC: 4.2 MMOL/L — SIGNIFICANT CHANGE UP (ref 3.5–5.3)
POTASSIUM SERPL-MCNC: 4.8 MMOL/L — SIGNIFICANT CHANGE UP (ref 3.5–5.3)
POTASSIUM SERPL-MCNC: 4.8 MMOL/L — SIGNIFICANT CHANGE UP (ref 3.5–5.3)
POTASSIUM SERPL-SCNC: 3.5 MMOL/L — SIGNIFICANT CHANGE UP (ref 3.5–5.3)
POTASSIUM SERPL-SCNC: 3.5 MMOL/L — SIGNIFICANT CHANGE UP (ref 3.5–5.3)
POTASSIUM SERPL-SCNC: 4.2 MMOL/L — SIGNIFICANT CHANGE UP (ref 3.5–5.3)
POTASSIUM SERPL-SCNC: 4.2 MMOL/L — SIGNIFICANT CHANGE UP (ref 3.5–5.3)
POTASSIUM SERPL-SCNC: 4.8 MMOL/L — SIGNIFICANT CHANGE UP (ref 3.5–5.3)
POTASSIUM SERPL-SCNC: 4.8 MMOL/L — SIGNIFICANT CHANGE UP (ref 3.5–5.3)
PROT SERPL-MCNC: 5.3 G/DL — LOW (ref 6.6–8.7)
PROT SERPL-MCNC: 5.3 G/DL — LOW (ref 6.6–8.7)
PROT SERPL-MCNC: 5.9 G/DL — LOW (ref 6.6–8.7)
PROT SERPL-MCNC: 5.9 G/DL — LOW (ref 6.6–8.7)
PROT SERPL-MCNC: 7.3 G/DL — SIGNIFICANT CHANGE UP (ref 6–8.3)
PROT SERPL-MCNC: 7.3 G/DL — SIGNIFICANT CHANGE UP (ref 6–8.3)
PROTHROM AB SERPL-ACNC: 11.5 SEC — SIGNIFICANT CHANGE UP (ref 9.5–13)
PROTHROM AB SERPL-ACNC: 11.5 SEC — SIGNIFICANT CHANGE UP (ref 9.5–13)
PROTHROM AB SERPL-ACNC: 11.9 SEC — SIGNIFICANT CHANGE UP (ref 9.5–13)
PROTHROM AB SERPL-ACNC: 11.9 SEC — SIGNIFICANT CHANGE UP (ref 9.5–13)
RBC # BLD: 2.63 M/UL — LOW (ref 4.2–5.8)
RBC # BLD: 2.63 M/UL — LOW (ref 4.2–5.8)
RBC # BLD: 3.88 M/UL — LOW (ref 4.2–5.8)
RBC # BLD: 3.88 M/UL — LOW (ref 4.2–5.8)
RBC # BLD: 4.28 M/UL — SIGNIFICANT CHANGE UP (ref 4.2–5.8)
RBC # BLD: 4.28 M/UL — SIGNIFICANT CHANGE UP (ref 4.2–5.8)
RBC # FLD: 12.4 % — SIGNIFICANT CHANGE UP (ref 10.3–14.5)
RBC # FLD: 12.4 % — SIGNIFICANT CHANGE UP (ref 10.3–14.5)
RBC # FLD: 12.5 % — SIGNIFICANT CHANGE UP (ref 10.3–14.5)
RBC # FLD: 12.5 % — SIGNIFICANT CHANGE UP (ref 10.3–14.5)
RBC # FLD: 12.7 % — SIGNIFICANT CHANGE UP (ref 10.3–14.5)
RBC # FLD: 12.7 % — SIGNIFICANT CHANGE UP (ref 10.3–14.5)
SODIUM SERPL-SCNC: 134 MMOL/L — LOW (ref 135–145)
SODIUM SERPL-SCNC: 134 MMOL/L — LOW (ref 135–145)
SODIUM SERPL-SCNC: 137 MMOL/L — SIGNIFICANT CHANGE UP (ref 135–145)
SODIUM SERPL-SCNC: 137 MMOL/L — SIGNIFICANT CHANGE UP (ref 135–145)
SODIUM SERPL-SCNC: 139 MMOL/L — SIGNIFICANT CHANGE UP (ref 135–145)
SODIUM SERPL-SCNC: 139 MMOL/L — SIGNIFICANT CHANGE UP (ref 135–145)
TROPONIN I, HIGH SENSITIVITY RESULT: 8.8 NG/L — SIGNIFICANT CHANGE UP
TROPONIN I, HIGH SENSITIVITY RESULT: 8.8 NG/L — SIGNIFICANT CHANGE UP
TROPONIN T, HIGH SENSITIVITY RESULT: 12 NG/L — SIGNIFICANT CHANGE UP (ref 0–51)
TROPONIN T, HIGH SENSITIVITY RESULT: 12 NG/L — SIGNIFICANT CHANGE UP (ref 0–51)
WBC # BLD: 11.11 K/UL — HIGH (ref 3.8–10.5)
WBC # BLD: 11.11 K/UL — HIGH (ref 3.8–10.5)
WBC # BLD: 8.16 K/UL — SIGNIFICANT CHANGE UP (ref 3.8–10.5)
WBC # BLD: 8.16 K/UL — SIGNIFICANT CHANGE UP (ref 3.8–10.5)
WBC # BLD: 8.59 K/UL — SIGNIFICANT CHANGE UP (ref 3.8–10.5)
WBC # BLD: 8.59 K/UL — SIGNIFICANT CHANGE UP (ref 3.8–10.5)
WBC # FLD AUTO: 11.11 K/UL — HIGH (ref 3.8–10.5)
WBC # FLD AUTO: 11.11 K/UL — HIGH (ref 3.8–10.5)
WBC # FLD AUTO: 8.16 K/UL — SIGNIFICANT CHANGE UP (ref 3.8–10.5)
WBC # FLD AUTO: 8.16 K/UL — SIGNIFICANT CHANGE UP (ref 3.8–10.5)
WBC # FLD AUTO: 8.59 K/UL — SIGNIFICANT CHANGE UP (ref 3.8–10.5)
WBC # FLD AUTO: 8.59 K/UL — SIGNIFICANT CHANGE UP (ref 3.8–10.5)

## 2023-11-10 PROCEDURE — 99291 CRITICAL CARE FIRST HOUR: CPT | Mod: 25

## 2023-11-10 PROCEDURE — 99291 CRITICAL CARE FIRST HOUR: CPT

## 2023-11-10 PROCEDURE — 99292 CRITICAL CARE ADDL 30 MIN: CPT

## 2023-11-10 PROCEDURE — 96374 THER/PROPH/DIAG INJ IV PUSH: CPT

## 2023-11-10 PROCEDURE — 72170 X-RAY EXAM OF PELVIS: CPT | Mod: 26

## 2023-11-10 PROCEDURE — 70486 CT MAXILLOFACIAL W/O DYE: CPT | Mod: 26,MA

## 2023-11-10 PROCEDURE — 61313 CRNEC/CRNOT STTL ICERE: CPT

## 2023-11-10 PROCEDURE — 70450 CT HEAD/BRAIN W/O DYE: CPT | Mod: 26,59,77,MA

## 2023-11-10 PROCEDURE — 71045 X-RAY EXAM CHEST 1 VIEW: CPT | Mod: 26,76

## 2023-11-10 PROCEDURE — 99285 EMERGENCY DEPT VISIT HI MDM: CPT

## 2023-11-10 PROCEDURE — 93010 ELECTROCARDIOGRAM REPORT: CPT

## 2023-11-10 PROCEDURE — 70450 CT HEAD/BRAIN W/O DYE: CPT | Mod: 26,77

## 2023-11-10 PROCEDURE — 72125 CT NECK SPINE W/O DYE: CPT | Mod: MA

## 2023-11-10 PROCEDURE — 93005 ELECTROCARDIOGRAM TRACING: CPT

## 2023-11-10 PROCEDURE — 70450 CT HEAD/BRAIN W/O DYE: CPT | Mod: 26,MA

## 2023-11-10 PROCEDURE — 72125 CT NECK SPINE W/O DYE: CPT | Mod: 26,MA

## 2023-11-10 PROCEDURE — 85730 THROMBOPLASTIN TIME PARTIAL: CPT

## 2023-11-10 PROCEDURE — 71045 X-RAY EXAM CHEST 1 VIEW: CPT | Mod: 26

## 2023-11-10 PROCEDURE — 99222 1ST HOSP IP/OBS MODERATE 55: CPT | Mod: 25

## 2023-11-10 PROCEDURE — 96375 TX/PRO/DX INJ NEW DRUG ADDON: CPT

## 2023-11-10 PROCEDURE — 80053 COMPREHEN METABOLIC PANEL: CPT

## 2023-11-10 PROCEDURE — 70498 CT ANGIOGRAPHY NECK: CPT | Mod: 26,MA

## 2023-11-10 PROCEDURE — 85610 PROTHROMBIN TIME: CPT

## 2023-11-10 PROCEDURE — 82962 GLUCOSE BLOOD TEST: CPT

## 2023-11-10 PROCEDURE — 88304 TISSUE EXAM BY PATHOLOGIST: CPT | Mod: 26

## 2023-11-10 PROCEDURE — 71045 X-RAY EXAM CHEST 1 VIEW: CPT

## 2023-11-10 PROCEDURE — 36415 COLL VENOUS BLD VENIPUNCTURE: CPT

## 2023-11-10 PROCEDURE — 70486 CT MAXILLOFACIAL W/O DYE: CPT | Mod: MA

## 2023-11-10 PROCEDURE — 36620 INSERTION CATHETER ARTERY: CPT

## 2023-11-10 PROCEDURE — 84484 ASSAY OF TROPONIN QUANT: CPT

## 2023-11-10 PROCEDURE — 70496 CT ANGIOGRAPHY HEAD: CPT | Mod: 26,MA

## 2023-11-10 PROCEDURE — 85025 COMPLETE CBC W/AUTO DIFF WBC: CPT

## 2023-11-10 PROCEDURE — 0042T: CPT

## 2023-11-10 PROCEDURE — 70450 CT HEAD/BRAIN W/O DYE: CPT | Mod: MA

## 2023-11-10 PROCEDURE — 61313 CRNEC/CRNOT STTL ICERE: CPT | Mod: AS

## 2023-11-10 PROCEDURE — 88311 DECALCIFY TISSUE: CPT | Mod: 26

## 2023-11-10 DEVICE — KIT A-LINE 1LUM 20G X 12CM SAFE KIT: Type: IMPLANTABLE DEVICE | Status: FUNCTIONAL

## 2023-11-10 DEVICE — SURGICEL 2 X 14": Type: IMPLANTABLE DEVICE | Status: FUNCTIONAL

## 2023-11-10 DEVICE — AVITENE: Type: IMPLANTABLE DEVICE | Status: FUNCTIONAL

## 2023-11-10 DEVICE — FLOSEAL WITH RECOTHROM THROMBIN 10ML: Type: IMPLANTABLE DEVICE | Status: FUNCTIONAL

## 2023-11-10 DEVICE — GRAFT DURAL MATRX 4X5IN DURGN: Type: IMPLANTABLE DEVICE | Status: FUNCTIONAL

## 2023-11-10 RX ORDER — LEVETIRACETAM 250 MG/1
1000 TABLET, FILM COATED ORAL ONCE
Refills: 0 | Status: DISCONTINUED | OUTPATIENT
Start: 2023-11-10 | End: 2023-11-10

## 2023-11-10 RX ORDER — MAGNESIUM SULFATE 500 MG/ML
2 VIAL (ML) INJECTION ONCE
Refills: 0 | Status: COMPLETED | OUTPATIENT
Start: 2023-11-10 | End: 2023-11-10

## 2023-11-10 RX ORDER — LABETALOL HCL 100 MG
10 TABLET ORAL
Refills: 0 | Status: DISCONTINUED | OUTPATIENT
Start: 2023-11-10 | End: 2023-12-07

## 2023-11-10 RX ORDER — SUCCINYLCHOLINE CHLORIDE 100 MG/5ML
100 SYRINGE (ML) INTRAVENOUS ONCE
Refills: 0 | Status: COMPLETED | OUTPATIENT
Start: 2023-11-10 | End: 2023-11-10

## 2023-11-10 RX ORDER — ACETAMINOPHEN 500 MG
1000 TABLET ORAL ONCE
Refills: 0 | Status: COMPLETED | OUTPATIENT
Start: 2023-11-10 | End: 2023-11-10

## 2023-11-10 RX ORDER — LABETALOL HCL 100 MG
10 TABLET ORAL ONCE
Refills: 0 | Status: COMPLETED | OUTPATIENT
Start: 2023-11-10 | End: 2023-11-10

## 2023-11-10 RX ORDER — ETOMIDATE 2 MG/ML
20 INJECTION INTRAVENOUS ONCE
Refills: 0 | Status: COMPLETED | OUTPATIENT
Start: 2023-11-10 | End: 2023-11-10

## 2023-11-10 RX ORDER — TETANUS TOXOID, REDUCED DIPHTHERIA TOXOID AND ACELLULAR PERTUSSIS VACCINE, ADSORBED 5; 2.5; 8; 8; 2.5 [IU]/.5ML; [IU]/.5ML; UG/.5ML; UG/.5ML; UG/.5ML
0.5 SUSPENSION INTRAMUSCULAR ONCE
Refills: 0 | Status: COMPLETED | OUTPATIENT
Start: 2023-11-10 | End: 2023-11-10

## 2023-11-10 RX ORDER — NICARDIPINE HYDROCHLORIDE 30 MG/1
5 CAPSULE, EXTENDED RELEASE ORAL
Qty: 40 | Refills: 0 | Status: DISCONTINUED | OUTPATIENT
Start: 2023-11-10 | End: 2023-11-13

## 2023-11-10 RX ORDER — ANDEXANET ALFA 200 MG/20ML
480 INJECTION, POWDER, LYOPHILIZED, FOR SOLUTION INTRAVENOUS ONCE
Refills: 0 | Status: COMPLETED | OUTPATIENT
Start: 2023-11-10 | End: 2023-11-10

## 2023-11-10 RX ORDER — FENTANYL CITRATE 50 UG/ML
50 INJECTION INTRAVENOUS ONCE
Refills: 0 | Status: DISCONTINUED | OUTPATIENT
Start: 2023-11-10 | End: 2023-11-10

## 2023-11-10 RX ORDER — CEFAZOLIN SODIUM 1 G
2000 VIAL (EA) INJECTION ONCE
Refills: 0 | Status: COMPLETED | OUTPATIENT
Start: 2023-11-10 | End: 2023-11-10

## 2023-11-10 RX ORDER — CHLORHEXIDINE GLUCONATE 213 G/1000ML
1 SOLUTION TOPICAL
Refills: 0 | Status: DISCONTINUED | OUTPATIENT
Start: 2023-11-10 | End: 2023-12-07

## 2023-11-10 RX ORDER — HYDRALAZINE HCL 50 MG
10 TABLET ORAL
Refills: 0 | Status: DISCONTINUED | OUTPATIENT
Start: 2023-11-10 | End: 2023-12-07

## 2023-11-10 RX ORDER — CEFAZOLIN SODIUM 1 G
2000 VIAL (EA) INJECTION ONCE
Refills: 0 | Status: DISCONTINUED | OUTPATIENT
Start: 2023-11-10 | End: 2023-11-10

## 2023-11-10 RX ORDER — LEVETIRACETAM 250 MG/1
500 TABLET, FILM COATED ORAL EVERY 12 HOURS
Refills: 0 | Status: DISCONTINUED | OUTPATIENT
Start: 2023-11-10 | End: 2023-11-14

## 2023-11-10 RX ORDER — LEVETIRACETAM 250 MG/1
1000 TABLET, FILM COATED ORAL ONCE
Refills: 0 | Status: COMPLETED | OUTPATIENT
Start: 2023-11-10 | End: 2023-11-10

## 2023-11-10 RX ORDER — ANDEXANET ALFA 200 MG/20ML
400 INJECTION, POWDER, LYOPHILIZED, FOR SOLUTION INTRAVENOUS ONCE
Refills: 0 | Status: COMPLETED | OUTPATIENT
Start: 2023-11-10 | End: 2023-11-10

## 2023-11-10 RX ORDER — ONDANSETRON 8 MG/1
4 TABLET, FILM COATED ORAL ONCE
Refills: 0 | Status: COMPLETED | OUTPATIENT
Start: 2023-11-10 | End: 2023-11-10

## 2023-11-10 RX ORDER — CHLORHEXIDINE GLUCONATE 213 G/1000ML
15 SOLUTION TOPICAL EVERY 12 HOURS
Refills: 0 | Status: DISCONTINUED | OUTPATIENT
Start: 2023-11-10 | End: 2023-11-13

## 2023-11-10 RX ADMIN — Medication 100 MILLIGRAM(S): at 11:11

## 2023-11-10 RX ADMIN — ANDEXANET ALFA 184.62 MILLIGRAM(S): 200 INJECTION, POWDER, LYOPHILIZED, FOR SOLUTION INTRAVENOUS at 10:11

## 2023-11-10 RX ADMIN — ANDEXANET ALFA 24 MILLIGRAM(S): 200 INJECTION, POWDER, LYOPHILIZED, FOR SOLUTION INTRAVENOUS at 10:09

## 2023-11-10 RX ADMIN — ANDEXANET ALFA 400 MILLIGRAM(S): 200 INJECTION, POWDER, LYOPHILIZED, FOR SOLUTION INTRAVENOUS at 10:14

## 2023-11-10 RX ADMIN — Medication 400 MILLIGRAM(S): at 20:12

## 2023-11-10 RX ADMIN — LEVETIRACETAM 400 MILLIGRAM(S): 250 TABLET, FILM COATED ORAL at 17:07

## 2023-11-10 RX ADMIN — ONDANSETRON 4 MILLIGRAM(S): 8 TABLET, FILM COATED ORAL at 10:10

## 2023-11-10 RX ADMIN — ETOMIDATE 20 MILLIGRAM(S): 2 INJECTION INTRAVENOUS at 11:20

## 2023-11-10 RX ADMIN — FENTANYL CITRATE 50 MICROGRAM(S): 50 INJECTION INTRAVENOUS at 11:10

## 2023-11-10 RX ADMIN — Medication 10 MILLIGRAM(S): at 10:09

## 2023-11-10 RX ADMIN — Medication 25 GRAM(S): at 20:48

## 2023-11-10 RX ADMIN — Medication 1000 MILLIGRAM(S): at 21:06

## 2023-11-10 RX ADMIN — NICARDIPINE HYDROCHLORIDE 25 MG/HR: 30 CAPSULE, EXTENDED RELEASE ORAL at 10:09

## 2023-11-10 RX ADMIN — NICARDIPINE HYDROCHLORIDE 5 MG/HR: 30 CAPSULE, EXTENDED RELEASE ORAL at 10:20

## 2023-11-10 NOTE — H&P ADULT - NSHPLABSRESULTS_GEN_ALL_CORE
Assessment: 82y M with PMH HTN, CAD s/p stents, renal cell carcinoma status post left nephrectomy, bladder CA, BPH, CHF, LBBB, vertigo,  HLD, 5.5cm AAA without rupture post EVAR, paroxysmal A-fib on Eliquis, Plavix, and aspirin, early stage Alzheimer dementia transferred from [facility] s/p unwitnessed fall with head strike at home. Patient A&Ox2 with GCS 15 to 14 during assessment., New onset L total hemineglect.     Plan:   -admission to SICU   -Neurosurgery to follow   -intubate for airway protection   -npo, IVF   -DVT ppx: SCDs    -CTA head and neck  -CT perfusion, stroke protocol  -CT head non-contrast   -xray pelvis   -xray chest   -ancef, tdap Assessment: 82y M with PMH HTN, CAD s/p stents, renal cell carcinoma status post left nephrectomy, bladder CA, BPH, CHF, LBBB, vertigo,  HLD, 5.5cm AAA without rupture post EVAR, paroxysmal A-fib on Eliquis, Plavix, and aspirin, early stage Alzheimer dementia transferred from [facility] s/p unwitnessed fall with head strike at home. Patient A&Ox2 with GCS 15 to 14 during assessment., New onset L total hemineglect.     Plan:   -admission to SICU   -Neurosurgery to follow   -intubate for airway protection   -npo, IVF   -DVT ppx: SCDs  -CTA head and neck  -CT perfusion, stroke protocol  -CT head non-contrast   -xray pelvis   -xray chest   -ancef, tdap Assessment: 82y M with PMH HTN, CAD s/p stents, renal cell carcinoma status post left nephrectomy, bladder CA, BPH, CHF, LBBB, vertigo,  HLD, 5.5cm AAA without rupture post EVAR, paroxysmal A-fib on Eliquis, Plavix, and aspirin, early stage Alzheimer dementia transferred from [facility] s/p unwitnessed fall with head strike at home. Patient with GCS of 15 during assessment., Also with left-sided weakness.     Plan:   -admission to SICU   -Neurosurgery to follow   -intubate for airway protection   -npo, IVF   -DVT ppx: SCDs  -CTA head and neck  -CT perfusion, stroke protocol  -CT head non-contrast   -xray pelvis   -xray chest   -ancef, tdap

## 2023-11-10 NOTE — ED ADULT NURSE NOTE - OBJECTIVE STATEMENT
pt reports to the ED as an activated code trauma B from [facility] s/p unwitnessed fall with + head strike at home. As per EMS pt was found down at home by wife, who than took him to urgent care, had 5 staples placed to posterior occipital and was than instructed to go to ED. pt on arrival to [place] had CT which showed ICH. pt brought to [facility], on arrival EMS reports pt in route as become more confused, pt noted with 2x emesis episodes, intubated in ED. pt came to ED on 6L NC.

## 2023-11-10 NOTE — ED PROCEDURE NOTE - ATTENDING CONTRIBUTION TO CARE
I was present during the entirety of this procedure, preparations made for intubation, pretreated with fentanyl, RSI with etomidate and succinylcholine.  Patient was on nasal cannula and flush rate nonrebreather, intubation was done single attempt with glide scope after moderate secretions were suctioned from airway.  There was no postintubation complications or hemodynamic instability, tolerated without complication.

## 2023-11-10 NOTE — ED ADULT NURSE NOTE - CHIEF COMPLAINT QUOTE
pt BIBA transfer from [facility], fall and hit his head on concrete, found to have a head bleed, left sided droop and left sided weakness on Cardene and Andexa before arrival. Code trauma B called upon arrival.

## 2023-11-10 NOTE — ED PROVIDER NOTE - ATTENDING CONTRIBUTION TO CARE
82 M history of hypertension, CAD on Eliquis presenting status post fall this morning 8 AM transferred from [facility] for right frontal intraparenchymal, subdural subarachnoid hemorrhage.  Was brought to [place] emergency status post andexanet alpha.      On examination patient was noted to be mildly hypertensive on presentation, somewhat drowsy with oral secretions.  GCS 15 following commands.  Noted scalp laceration on secondary survey.  Patient seen by trauma and neurosurgery.    Patient quickly became less responsive, unable to protect airway at this time we did decide to intubate for her compromised airway.  See intubation note.  Preoxygenated, RSI with etomidate and succinylcholine, pretreat with fentanyl.  [place] scope intubation first-pass success after moderate secretions were suctioned from airway and placed on propofol for sedation.  CT imaging reviewed by me showing evidence of evolving right parietal intraparenchymal hemorrhage with mass effect and mild midline shift.  Admitted to SICU under trauma surgery service.

## 2023-11-10 NOTE — CONSULT NOTE ADULT - ASSESSMENT
Assessment and Plan:   · Assessment	  83 yo male POD#0 s/p a right decompressive hemicraniectomy by Dr. Larios    Plan:  -Medical management per NSICU  -Pain Control, avoid over sedation  -CTH in AM  -Goal SBP < 160  -Continue to monitor and record drain output q4 hours  -Keppra for seizure ppx  -Alice-op antibiotics x 3 doses  -Keep HOB < 30 degrees   -Wean to extubate when able  -DVT ppx: SCD's, hold pharmacological DVT ppx until cleared by NSG  -Case and plan discussed with Dr. Larios

## 2023-11-10 NOTE — ED ADULT NURSE NOTE - TEMPLATE LIST FOR HEAD TO TOE ASSESSMENT
Fall Cheilitis Normal Treatment: I recommended application of Vaseline or Aquaphor numerous times a day (as often as every hour) and before going to bed.

## 2023-11-10 NOTE — ED PROVIDER NOTE - CLINICAL SUMMARY MEDICAL DECISION MAKING FREE TEXT BOX
81 y/o M with PMHx CAD s/p stents, renal cell carcinoma status post left nephrectomy, bladder CA, BPH, CHF, LBBB, vertigo,  HLD, 5.5cm AAA without rupture post EVAR, paroxysmal A-fib on Eliquis, Plavix, and aspirin, early stage Alzheimer dementia transferred from [facility] s/p unwitnessed fall with head strike at home found by wife on floor at 8am. Unable to tolerate laying flat for CT scans due to nausea/vomiting so patient was intubated for airway protection. Trauma and Neurosurgery teams at bedside, patient to go for stat repeat CT scans and angiograms and then to SICU.

## 2023-11-10 NOTE — ED ADULT NURSE NOTE - CHIEF COMPLAINT QUOTE
patient fell on tiled floor and hit his head, went to [facility] and had staples, patient is on Eliquis

## 2023-11-10 NOTE — H&P ADULT - NSHPPHYSICALEXAM_GEN_ALL_CORE
T(F): -- 98.2    HR: -- 82 bmp   BP: --    141/68 mmHg prior to intubation   RR: --  SpO2: --    CONSTITUTIONAL: Well groomed, no apparent distress  EYES: PERRLA and symmetric, EOMI, No conjunctival or scleral injection, non-icteric  ENMT: Oral mucosa with moist membranes. Normal dentition; no pharyngeal injection or exudates             NECK: Supple, symmetric and without tracheal deviation   RESP: No respiratory distress, no use of accessory muscles; CTA b/l, no WRR  CV: RRR, +S1S2, no MRG; no JVD; no peripheral edema  GI: Soft, NT, ND, no rebound, no guarding; no palpable masses; no hepatosplenomegaly; no hernia palpated  LYMPH: No cervical LAD or tenderness; no axillary LAD or tenderness; no inguinal LAD or tenderness  MSK: Normal gait; No digital clubbing or cyanosis; examination of the (head/neck/spine/ribs/pelvis, RUE, LUE, RLE, LLE) without misalignment,            Normal ROM without pain, no spinal tenderness, normal muscle strength/tone  SKIN: No rashes or ulcers noted; no subcutaneous nodules or induration palpable  NEURO: CN II-XII intact; normal reflexes in upper and lower extremities, sensation intact in upper and lower extremities b/l to light touch   PSYCH: Appropriate insight/judgment; A+O x 3, mood and affect appropriate, recent/remote memory intact T(F): -- 98.2    HR: -- 82 bmp   BP: --   141/68 mmHg prior to intubation   RR: -- 18 breaths/min   SpO2: -- 99% on 6L nasal cannula     IVs: 22G L AC and 18G R forearm     CONSTITUTIONAL: patient in some distress A&Ox2, GCS 15   EYES: PERRLA and symmetric, EOMI  ENMT: Oral mucosa with moist membranes. Normal dentition  NECK: c-spine cleared by confrontation; Full ROM, supple, symmetric and without tracheal deviation   RESP: B/L equal breath sounds, no respiratory distress, no use of accessory muscles; no chest wall tenderness to palpation, no chest wall crepitus or step off   CV: RRR, +S1S2, no JVD  GI: Soft, NT, ND, no rebound, no guarding  MSK: Examination of the (head/neck/spine/ribs/pelvis, RUE, LUE, RLE, LLE) without misalignment, No spinal midline tenderness throughout, full ROM head and neck without pain   SKIN: (+) repaired laceration of posterior scalp; no additional masses/scars/lesions noted  NEURO: (+) extension RLE, (+) withdrawal LLE, (+) complete L hemineglect; sensation intact in upper and lower extremities on R side to touch  PSYCH: A+O x 2 T(F): -- 98.2    HR: -- 82 bmp   BP: --   178/76 mmHg, 141/68 mmHg prior to intubation   RR: -- 18 breaths/min   SpO2: -- 99% on 6L nasal cannula     IVs: 22G L AC and 18G R forearm     CONSTITUTIONAL: patient in some distress A&Ox2, GCS 15   EYES: PERRLA and symmetric, EOMI  ENMT: Oral mucosa with moist membranes. Normal dentition  NECK: c-spine cleared by confrontation; Full ROM, supple, symmetric and without tracheal deviation   RESP: B/L equal breath sounds, no respiratory distress, no use of accessory muscles; no chest wall tenderness to palpation, no chest wall crepitus or step off   CV: RRR, +S1S2, no JVD  GI: Soft, NT, ND, no rebound, no guarding  MSK: Examination of the (head/neck/spine/ribs/pelvis, RUE, LUE, RLE, LLE) without misalignment, No spinal midline tenderness throughout, full ROM head and neck without pain   SKIN: (+) repaired laceration of posterior scalp; no additional masses/scars/lesions noted  NEURO: (+) extension RLE, (+) withdrawal LLE, (+) complete L hemineglect; sensation intact in upper and lower extremities on R side to touch  PSYCH: A+O x 2

## 2023-11-10 NOTE — ED PROVIDER NOTE - NSICDXPASTMEDICALHX_GEN_ALL_CORE_FT
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

## 2023-11-10 NOTE — PROGRESS NOTE ADULT - SUBJECTIVE AND OBJECTIVE BOX
Post Op Note     HPI:  82y M with PMH HTN, CAD s/p stents, renal cell carcinoma status post left nephrectomy, bladder CA, BPH, CHF, LBBB, vertigo,  HLD, 5.5cm AAA without rupture post EVAR, paroxysmal A-fib on Eliquis, Plavix, and aspirin, early stage Alzheimer dementia transferred from [facility] s/p unwitnessed fall with head strike at home found by wife on floor at 8am. Patient brought to urgent care by wife and had 5 staples to posterior scalp but was instructed to go to nearest ED.  CT head at [place] showed R frontal IPH, SDH, SAH at 9:00. CTA stroke protocol not performed at [facility]. Patient BIB on 6L NC.  Pt GCS 15 on arrival, A&Ox2 on arrival. After initial assessment, patient noted to be vomiting enroute to CT scanner.    (10 Nov 2023 11:04)    INTERVAL HPI  11/10 Right Hemicraniectomy     81 yo male s/p an unwitnessed fall, who is now s/p a right hemicraniectomy. Patient intubated and unable to reliably provide complaints.      Vital Signs Last 24 Hrs  T(C): 38.2 (10 Nov 2023 20:00), Max: 38.2 (10 Nov 2023 20:00)  T(F): 100.8 (10 Nov 2023 20:00), Max: 100.8 (10 Nov 2023 20:00)  HR: 66 (10 Nov 2023 20:00) (66 - 90)  BP: 158/78 (10 Nov 2023 20:00) (138/69 - 160/95)  BP(mean): 101 (10 Nov 2023 20:00) (90 - 110)  RR: 20 (10 Nov 2023 20:00) (16 - 22)  SpO2: 100% (10 Nov 2023 20:00) (100% - 100%)    Parameters below as of 10 Nov 2023 20:00  Patient On (Oxygen Delivery Method): ventilator    PHYSICAL EXAM:  GENERAL: NAD, Intubated  HEAD:  Right Craniectomy site soft, dressing clean, dry and intact   DRAINS: (+) IZABEL in place and draining well   MENTAL STATUS: Lethargic but opens eyes to light touch, pupils 2 mm, intubated, following commands on the right upper and lower extremity   CRANIAL NERVES: PERRLA  MOTOR: Right upper and lower extremity antigravity, withdraws left upper and lower extremity to noxious  CHEST/LUNG: Intubated  SKIN: Warm, dry; no rashes or lesions    LABS:                        8.4    8.59  )-----------( 95       ( 10 Nov 2023 18:30 )             23.9     11-10    134<L>  |  102  |  22.1<H>  ----------------------------<  184<H>  4.8   |  19.0<L>  |  1.48<H>    Ca    8.6      10 Nov 2023 18:30  Phos  2.0     11-10  Mg     1.4     11-10    TPro  5.9<L>  /  Alb  3.5  /  TBili  1.0  /  DBili  x   /  AST  16  /  ALT  12  /  AlkPhos  89  11-10    PT/INR - ( 10 Nov 2023 11:00 )   PT: 11.5 sec;   INR: 1.04 ratio         PTT - ( 10 Nov 2023 11:00 )  PTT:30.8 sec  Urinalysis Basic - ( 10 Nov 2023 18:30 )    Color: x / Appearance: x / SG: x / pH: x  Gluc: 184 mg/dL / Ketone: x  / Bili: x / Urobili: x   Blood: x / Protein: x / Nitrite: x   Leuk Esterase: x / RBC: x / WBC x   Sq Epi: x / Non Sq Epi: x / Bacteria: x    11-10 @ 07:01  -  11-10 @ 20:17  --------------------------------------------------------  IN: 50 mL / OUT: 765 mL / NET: -715 mL    RADIOLOGY & ADDITIONAL TESTS:  < from: CT Head No Cont (11.10.23 @ 11:39) >  IMPRESSION:    1.Large parenchymal hemorrhage centered in the right frontal lobe   resulting in midline shift to the left of approximately 0.5 cm.  2.  Multiple extra-axial hematomas as discussed above.  3.  Scattered subarachnoid blood along the right cerebral hemisphere.    --- End of Report ---    TAMMIE FARRELL MD; Attending Radiologist  This document has been electronically signed. Nov 10 2023 11:54AM    < end of copied text >  < from: CT Angio Head w/ IV Cont (11.10.23 @ 11:45) >  IMPRESSION:    CTA BRAIN:  1.  No evidence of intracranial aneurysm, critical stenosis, or abrupt   cut off of intraluminal contrast.  2.  No distinct opacification of the right sigmoid sinus. Although this   is not a designated CTV study, an obstructive thrombus is not entirely   excluded.    CTA NECK:  1.  Atherosclerotic calcification and plaque in the left carotid   bulb/proximal ICA resulting in severe luminal narrowing. Recommend   Doppler imaging for further evaluation of flow.  2.  Atherosclerotic calcification in the right vertebral artery ostium   resulting in moderate severe luminal narrowing.  3.  Nodularity to the bilateral lobes of the thyroid. Consider   nonemergent thyroid ultrasound for further characterization if clinically   indicated.    CT PERFUSION:  1.  Increased CBF and CBV values in the anterior right MCA distribution,   correlating with the known hemorrhagic infarction.  2.  Slightly increased Tmax values about the periphery of the infarction   which may correlate with an ischemic penumbra.    --- End of Report ---    TAMMIE FARRELL MD; Attending Radiologist  This document has been electronically signed. Nov 10 2023 12:48PM    < end of copied text >

## 2023-11-10 NOTE — ED PROVIDER NOTE - PHYSICAL EXAMINATION
General: NAD  Head: repaired lac to posterior scalp  EENT: no scleral icterus  Cardiac: no lower extremity edema  Respiratory: no respiratory distress   Abdomen: ND  MSK/Vascular: warm extremities  Neuro: AAOx2, L sided upper and lower extremity weakness  Psych: cooperative

## 2023-11-10 NOTE — ED PROVIDER NOTE - NSICDXPASTMEDICALHX_GEN_ALL_CORE_FT
PAST MEDICAL HISTORY:  CAD (coronary artery disease)     History of abdominal aortic aneurysm (AAA)     Hypertension

## 2023-11-10 NOTE — ED PROVIDER NOTE - OBJECTIVE STATEMENT
81 y/o M with PMHx CAD s/p stents, renal cell carcinoma status post left nephrectomy, bladder CA, BPH, CHF, LBBB, vertigo,  HLD, 5.5cm AAA without rupture post EVAR, paroxysmal A-fib on Eliquis, Plavix, and aspirin, early stage Alzheimer dementia transferred from [facility] s/p unwitnessed fall with head strike at home found by wife on floor at 8am. CT head at 9 AM at [place] showing R frontal IPH, SDH, SAH, angiograms not performed prior to transfer. Per EMS, patient had L sided weakness at [place] and state that he has been complaining of worsening nausea during transport here. A/Ox2 upon arrival here with re-demonstrated L sided weakness.

## 2023-11-10 NOTE — ED ADULT NURSE NOTE - NSFALLHARMRISKINTERV_ED_ALL_ED

## 2023-11-10 NOTE — H&P ADULT - NS ATTEND AMEND GEN_ALL_CORE FT
Patient transferred from OSH. Level B activation on arrival.   HPI: Wife found patient on floor this AM, reportedly was neuro intact and was able to present to urgent care center, and subsequently OSH (after reporting hx of AC to urgent care center). At OSH,   Per OSH report, he woke up at 7:30am today without any complaints, subsequently found on floor by wife between 8am and 8:30am. He was noted to have laceration to scalp, left facial droop, slurred speech, NIH 5. Also noted to have 6cm intracranial hemorrhage without shift. He received bolus of adnexa, and was transported to this facility with adnexa gtt running. Also noted to be hypertensive with systolics to 190s.     [facility]: On initial assessment, he was noted to be GCS 15, with facial droop, and with left-sided weakness/neglect.   Exams confirmed scalp laceration, but otherwise no other overt signs of trauma.   NSG and CODE stroke called.   Subsequently noted to be vomiting en route to scanner --> brought back to trauma bay and intubated.  BPs better controlled while on propofol, with SBPs in 130s.      Imaging study with evidence of worsening intracranial bleed; patient transported directly to SICU; intubated.  --Care transferred to SICU.     --pending NSG recs, ?OR.

## 2023-11-10 NOTE — ED ADULT TRIAGE NOTE - CHIEF COMPLAINT QUOTE
pt BIBA transfer from [facility], fall and hit his head on concrete, found to have a head bleed, left sided droop and left sided weakness on Cardene and Andexa before arrival. Code trauma B called upon arrival.

## 2023-11-10 NOTE — PATIENT PROFILE ADULT - MONEY FOR FOOD
[FreeTextEntry1] : He patient is currently undergoing evaluation for possible sleep apnea. This is being conducted by an ENT specialist. He was scheduled for an overnight stay at the hospital but this has been postponed due to to the viral epidemic. His symptoms consist of slight snoring and some daytime somnolence.
no

## 2023-11-10 NOTE — CONSULT NOTE ADULT - SUBJECTIVE AND OBJECTIVE BOX
HPI:  82y M with PMH HTN, CAD s/p stents, renal cell carcinoma status post left nephrectomy, bladder CA, BPH, CHF, LBBB, vertigo,  HLD, 5.5cm AAA without rupture post EVAR, paroxysmal A-fib on Eliquis, Plavix, and aspirin, early stage Alzheimer dementia transferred from [facility] s/p unwitnessed fall with head strike at home found by wife on floor at 8am. Patient brought to urgent care by wife and had 5 staples to posterior scalp but was instructed to go to nearest ED.  CT head at [place] showed R frontal IPH, SDH, SAH at 9:00. CTA stroke protocol not performed at [facility]. Patient BIB on 6L NC.  Pt GCS 15 on arrival, A&Ox2 on arrival. After initial assessment, patient noted to be vomiting enroute to CT scanner.    (10 Nov 2023 11:04)    INTERVAL HPI  11/10 Right Hemicraniectomy     83 yo male s/p an unwitnessed fall, who is now s/p a right hemicraniectomy. Patient intubated and unable to reliably provide complaints.      Vital Signs Last 24 Hrs  T(C): 38.2 (10 Nov 2023 20:00), Max: 38.2 (10 Nov 2023 20:00)  T(F): 100.8 (10 Nov 2023 20:00), Max: 100.8 (10 Nov 2023 20:00)  HR: 66 (10 Nov 2023 20:00) (66 - 90)  BP: 158/78 (10 Nov 2023 20:00) (138/69 - 160/95)  BP(mean): 101 (10 Nov 2023 20:00) (90 - 110)  RR: 20 (10 Nov 2023 20:00) (16 - 22)  SpO2: 100% (10 Nov 2023 20:00) (100% - 100%)    Parameters below as of 10 Nov 2023 20:00  Patient On (Oxygen Delivery Method): ventilator    PHYSICAL EXAM:  GENERAL: NAD, Intubated  HEAD:  Right Craniectomy site soft, dressing clean, dry and intact   DRAINS: (+) IZABEL in place and draining well   MENTAL STATUS: Lethargic but opens eyes to light touch, pupils 2 mm, intubated, following commands on the right upper and lower extremity   CRANIAL NERVES: PERRLA  MOTOR: Right upper and lower extremity antigravity, withdraws left upper and lower extremity to noxious  CHEST/LUNG: Intubated  SKIN: Warm, dry; no rashes or lesions    LABS:                        8.4    8.59  )-----------( 95       ( 10 Nov 2023 18:30 )             23.9     11-10    134<L>  |  102  |  22.1<H>  ----------------------------<  184<H>  4.8   |  19.0<L>  |  1.48<H>    Ca    8.6      10 Nov 2023 18:30  Phos  2.0     11-10  Mg     1.4     11-10    TPro  5.9<L>  /  Alb  3.5  /  TBili  1.0  /  DBili  x   /  AST  16  /  ALT  12  /  AlkPhos  89  11-10    PT/INR - ( 10 Nov 2023 11:00 )   PT: 11.5 sec;   INR: 1.04 ratio         PTT - ( 10 Nov 2023 11:00 )  PTT:30.8 sec  Urinalysis Basic - ( 10 Nov 2023 18:30 )    Color: x / Appearance: x / SG: x / pH: x  Gluc: 184 mg/dL / Ketone: x  / Bili: x / Urobili: x   Blood: x / Protein: x / Nitrite: x   Leuk Esterase: x / RBC: x / WBC x   Sq Epi: x / Non Sq Epi: x / Bacteria: x    11-10 @ 07:01  -  11-10 @ 20:17  --------------------------------------------------------  IN: 50 mL / OUT: 765 mL / NET: -715 mL    RADIOLOGY & ADDITIONAL TESTS:  < from: CT Head No Cont (11.10.23 @ 11:39) >  IMPRESSION:    1.Large parenchymal hemorrhage centered in the right frontal lobe   resulting in midline shift to the left of approximately 0.5 cm.  2.  Multiple extra-axial hematomas as discussed above.  3.  Scattered subarachnoid blood along the right cerebral hemisphere.    --- End of Report ---    TAMMIE FARRELL MD; Attending Radiologist  This document has been electronically signed. Nov 10 2023 11:54AM    < end of copied text >  < from: CT Angio Head w/ IV Cont (11.10.23 @ 11:45) >  IMPRESSION:    CTA BRAIN:  1.  No evidence of intracranial aneurysm, critical stenosis, or abrupt   cut off of intraluminal contrast.  2.  No distinct opacification of the right sigmoid sinus. Although this   is not a designated CTV study, an obstructive thrombus is not entirely   excluded.    CTA NECK:  1.  Atherosclerotic calcification and plaque in the left carotid   bulb/proximal ICA resulting in severe luminal narrowing. Recommend   Doppler imaging for further evaluation of flow.  2.  Atherosclerotic calcification in the right vertebral artery ostium   resulting in moderate severe luminal narrowing.  3.  Nodularity to the bilateral lobes of the thyroid. Consider   nonemergent thyroid ultrasound for further characterization if clinically   indicated.    CT PERFUSION:  1.  Increased CBF and CBV values in the anterior right MCA distribution,   correlating with the known hemorrhagic infarction.  2.  Slightly increased Tmax values about the periphery of the infarction   which may correlate with an ischemic penumbra.    --- End of Report ---    TAMMIE FARRELL MD; Attending Radiologist  This document has been electronically signed. Nov 10 2023 12:48PM    < end of copied text >        Assessment and Plan:   · Assessment	  83 yo male POD#0 s/p a right decompressive hemicraniectomy by Dr. Larios    Plan:  -Medical management per NSICU  -Pain Control, avoid over sedation  -CTH in AM  -Goal SBP < 160  -Continue to monitor and record drain output q4 hours  -Keppra for seizure ppx  -Alice-op antibiotics x 3 doses  -Keep HOB < 30 degrees   -Wean to extubate when able  -DVT ppx: SCD's, hold pharmacological DVT ppx until cleared by NSG  -Case and plan discussed with Dr. Larios      HPI:  82y M with PMH HTN, CAD s/p stents, renal cell carcinoma status post left nephrectomy, bladder CA, BPH, CHF, LBBB, vertigo,  HLD, 5.5cm AAA without rupture post EVAR, paroxysmal A-fib on Eliquis, Plavix, and aspirin, early stage Alzheimer dementia transferred from [facility] s/p unwitnessed fall with head strike at home found by wife on floor at 8am. Patient brought to urgent care by wife and had 5 staples to posterior scalp but was instructed to go to nearest ED.  CT head at [place] showed R frontal IPH, SDH, SAH at 9:00. CTA stroke protocol not performed at [facility]. Patient BIB on 6L NC.  Pt GCS 15 on arrival, A&Ox2 on arrival. After initial assessment, patient noted to be vomiting enroute to CT scanner.    (10 Nov 2023 11:04)    INTERVAL HPI  11/10 Right Hemicraniectomy     83 yo male s/p an unwitnessed fall, who is now s/p a right hemicraniectomy. Patient intubated and unable to reliably provide complaints.      Vital Signs Last 24 Hrs  T(C): 38.2 (10 Nov 2023 20:00), Max: 38.2 (10 Nov 2023 20:00)  T(F): 100.8 (10 Nov 2023 20:00), Max: 100.8 (10 Nov 2023 20:00)  HR: 66 (10 Nov 2023 20:00) (66 - 90)  BP: 158/78 (10 Nov 2023 20:00) (138/69 - 160/95)  BP(mean): 101 (10 Nov 2023 20:00) (90 - 110)  RR: 20 (10 Nov 2023 20:00) (16 - 22)  SpO2: 100% (10 Nov 2023 20:00) (100% - 100%)    Parameters below as of 10 Nov 2023 20:00  Patient On (Oxygen Delivery Method): ventilator    PHYSICAL EXAM:  GENERAL: NAD, Intubated  HEAD:  Right Craniectomy site soft, dressing clean, dry and intact   DRAINS: (+) IZABEL in place and draining well   MENTAL STATUS: Lethargic but opens eyes to light touch, pupils 2 mm, intubated, following commands on the right upper and lower extremity   CRANIAL NERVES: PERRLA  MOTOR: Right upper and lower extremity antigravity, withdraws left upper and lower extremity to noxious  CHEST/LUNG: Intubated  SKIN: Warm, dry; no rashes or lesions    LABS:                        8.4    8.59  )-----------( 95       ( 10 Nov 2023 18:30 )             23.9     11-10    134<L>  |  102  |  22.1<H>  ----------------------------<  184<H>  4.8   |  19.0<L>  |  1.48<H>    Ca    8.6      10 Nov 2023 18:30  Phos  2.0     11-10  Mg     1.4     11-10    TPro  5.9<L>  /  Alb  3.5  /  TBili  1.0  /  DBili  x   /  AST  16  /  ALT  12  /  AlkPhos  89  11-10    PT/INR - ( 10 Nov 2023 11:00 )   PT: 11.5 sec;   INR: 1.04 ratio         PTT - ( 10 Nov 2023 11:00 )  PTT:30.8 sec  Urinalysis Basic - ( 10 Nov 2023 18:30 )    Color: x / Appearance: x / SG: x / pH: x  Gluc: 184 mg/dL / Ketone: x  / Bili: x / Urobili: x   Blood: x / Protein: x / Nitrite: x   Leuk Esterase: x / RBC: x / WBC x   Sq Epi: x / Non Sq Epi: x / Bacteria: x    11-10 @ 07:01  -  11-10 @ 20:17  --------------------------------------------------------  IN: 50 mL / OUT: 765 mL / NET: -715 mL    RADIOLOGY & ADDITIONAL TESTS:  < from: CT Head No Cont (11.10.23 @ 11:39) >  IMPRESSION:    1.Large parenchymal hemorrhage centered in the right frontal lobe   resulting in midline shift to the left of approximately 0.5 cm.  2.  Multiple extra-axial hematomas as discussed above.  3.  Scattered subarachnoid blood along the right cerebral hemisphere.    --- End of Report ---    TAMMIE FARRELL MD; Attending Radiologist  This document has been electronically signed. Nov 10 2023 11:54AM    < end of copied text >  < from: CT Angio Head w/ IV Cont (11.10.23 @ 11:45) >  IMPRESSION:    CTA BRAIN:  1.  No evidence of intracranial aneurysm, critical stenosis, or abrupt   cut off of intraluminal contrast.  2.  No distinct opacification of the right sigmoid sinus. Although this   is not a designated CTV study, an obstructive thrombus is not entirely   excluded.    CTA NECK:  1.  Atherosclerotic calcification and plaque in the left carotid   bulb/proximal ICA resulting in severe luminal narrowing. Recommend   Doppler imaging for further evaluation of flow.  2.  Atherosclerotic calcification in the right vertebral artery ostium   resulting in moderate severe luminal narrowing.  3.  Nodularity to the bilateral lobes of the thyroid. Consider   nonemergent thyroid ultrasound for further characterization if clinically   indicated.    CT PERFUSION:  1.  Increased CBF and CBV values in the anterior right MCA distribution,   correlating with the known hemorrhagic infarction.  2.  Slightly increased Tmax values about the periphery of the infarction   which may correlate with an ischemic penumbra.    --- End of Report ---    TAMMIE FARRELL MD; Attending Radiologist  This document has been electronically signed. Nov 10 2023 12:48PM    < end of copied text >

## 2023-11-10 NOTE — ED ADULT TRIAGE NOTE - CHIEF COMPLAINT QUOTE
patient fell on tiled floor and hit his head, went to [facility] and had staples, patient is on Eliquis

## 2023-11-10 NOTE — ED PROVIDER NOTE - OBJECTIVE STATEMENT
82-year-old male with PMH HTN, CAD with cardiac stents, renal cell carcinoma status post left nephrectomy, HLD, AAA post EVAR, paroxysmal A-fib on Eliquis, Plavix, aspirin, presents to the emergency department with wife at the bedside, states that the patient has early stage dementia, awoke at 7:30 AM feeling well, had a fall between the hours of 8 and 8:30 AM this morning in which the patient was found on the floor with blood to the side of his head.  Wife then took the patient to urgent care in which 5 staples were placed on his scalp and told to go to the emergency department.  Wife states that the patient seemed to be talking less and slurring his words, on triage noted to have left facial droop.  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

## 2023-11-10 NOTE — ED PROVIDER NOTE - NSICDXPASTSURGICALHX_GEN_ALL_CORE_FT
PAST SURGICAL HISTORY:  H/O abdominal aortic aneurysm repair     History of cystoscopy April 2015    History of heart artery stent DIONICIO    History of Lt  Nephrectomy 6/10 - left    History of nephrectomy Left    Presence of stent in LAD coronary artery     S/P AAA repair     S/P Cystoscopy bladder polyps removal multiple times (since 1999), 6/10 , 10/2011 & 10/17/2011, 5/12, 11/2012, last 5/13/13, 12/2013, 7/2014    S/P Tonsillectomy     urethral Stent 7/2008 stent placement and removal

## 2023-11-10 NOTE — CONSULT NOTE ADULT - SUBJECTIVE AND OBJECTIVE BOX
Patient is a 82y old  Male who presents with a chief complaint of fall  HPI: 82M presented to [place] s/p fall, pt on Eliquis, found to have R ICH, falx SDH, L SAH, L frontal ICH. Per report patient reversed with K centra at [place]. On arrival to ED to pt GCS 14, vomiting in CT scan, intubated for airway protection.       PAST MEDICAL & SURGICAL HISTORY:  Hypertension      History of abdominal aortic aneurysm (AAA)      CAD (coronary artery disease)        FAMILY HISTORY:      Allergies    No Known Allergies    Intolerances    REVIEW OF SYSTEMS  unable to perform due to neurological status     HOME MEDICATIONS:  Home Medications:      MEDICATIONS:  Antibiotics:  ceFAZolin   IVPB 2000 milliGRAM(s) IV Intermittent once    Neuro:    Anticoagulation:    OTHER:  diphtheria/tetanus/pertussis (acellular) Vaccine (Adacel) 0.5 milliLiter(s) IntraMuscular once    IVF:    , RR 21, SpO2 100% on BM     PHYSICAL EXAM: examined prior to intubation   GENERAL: NAD   HEAD:  posterior scalp lac repaired with staples   EYES: Conjunctiva and sclera clear  ENMT: no obvious lesions  NECK: non tender   JOEY COMA SCORE: E-4 V-4 M-6 = 14  MENTAL STATUS: AAO x2 (person, place); Awake; Opens eyes spontaneously; following simple commands  CRANIAL NERVES: + right gaze preference, unable to cross midline to the left, + left facial droop, BURKE, hearing seems intact.    MOTOR: RUE 5/5, LUE extends to noxious, RLE 5/5, LLE WD, left side neglect   SENSATION: pt intact to light touch on right, intact to noxious on left, extinction to b/l stimulation on left   CHEST/LUNG: non labored   HEART: rrr  ABDOMEN: Soft, nontender, nondistended  EXTREMITIES: no edema   SKIN: Warm, dry; no rashes or lesions    LABS:                CULTURES:      RADIOLOGY & ADDITIONAL STUDIES:      CAPRINI SCORE [CLOT]:  Patient has an estimated Caprini score of greater than 5.  However, the patient's unique clinical situation will be addressed in an individual manner to determine appropriate anticoagulation treatment, if any.

## 2023-11-10 NOTE — PATIENT PROFILE ADULT - FALL HARM RISK - RISK INTERVENTIONS

## 2023-11-10 NOTE — ED PROVIDER NOTE - NS ED ROS FT
Constitutional: no fever, no chills  Head: + laceration  Eyes: no redness  ENMT: no nasal congestion/drainage  CV: no chest pain, no edema  Resp: no cough, no dyspnea  GI: no abdominal pain, no nausea, no vomiting, no diarrhea  : no dysuria  Skin: no lesions, no rashes   Neuro: + weakness

## 2023-11-10 NOTE — H&P ADULT - HISTORY OF PRESENT ILLNESS
82y M with PMH afib on Plavix, ASA, and Eliquis, HTN, CAD, and AAA transfered from [facility] s/p unwitnessed fall with head strike at home found by wife on floor. Laceration of posterior head repaired at [facility]. CT head at [place] showed R frontal IPH, SDH, SAH at 9:00. Patient BIB on 6L NC. Patient actively vomiting without acute complaints of pain. Pt GCS 15 on arrival. A&Ox2     vomiting   bleeding posterior head   BP: 171/83, 178/68 bib ambulance   transfer from [facility]   A&Ox2   GCS 15    141/68 prior to intubation     airway: patent, 6L NC   breathing: BL breath sounds   circulation: peripheral and central pulses intact, HR 82 98.2 temp   GCS: 15  22 L AC and 18G R forearm     Repaired laceration posterior L scalp   no chest wall tenderness/crepitus/step off   no abdominal tenderness   upper extremities and lower extremities good   pelvis stable   CTA head and neck , non con ct   no distracting injuries   0/10 pain   c spine cleared by confrontation   pelvis xray   L sided hemineglect        82y M with PMH HTN, CAD s/p stents, renal cell carcinoma status post left nephrectomy, HLD, AAA post EVAR, paroxysmal A-fib on Eliquis, Plavix, and aspirin, transferred from [facility] s/p unwitnessed fall with head strike at home found by wife on floor at 8am. Patient brought to urgent care by wife and had 5 staples to posterior scalp but was instructed to go to nearest ED.  CT head at [place] showed R frontal IPH, SDH, SAH at 9:00. Patient BIB on 6L NC. Patient actively vomiting without acute complaints of pain. Pt GCS 15, A&Ox2 on arrival. Patient also vomiting upon arrival.     bleeding posterior head   BP: 171/83, 178/68 bib ambulance   A&Ox2   GCS 15    airway: patent, 6L NC   breathing: BL breath sounds   circulation: peripheral and central pulses intact, HR 82   GCS: 15  22 L AC and 18G R forearm     Repaired laceration posterior L scalp   no chest wall tenderness/crepitus/step off   no abdominal tenderness   upper extremities and lower extremities good   pelvis stable   CTA head and neck , non con ct   no distracting injuries   0/10 pain   c spine cleared by confrontation   pelvis xray   L sided hemineglect        82y M with PMH HTN, CAD s/p stents, renal cell carcinoma status post left nephrectomy, HLD, AAA post EVAR, paroxysmal A-fib on Eliquis, Plavix, and aspirin, early stage Alzheimer dementia transferred from [facility] s/p unwitnessed fall with head strike at home found by wife on floor at 8am. Patient brought to urgent care by wife and had 5 staples to posterior scalp but was instructed to go to nearest ED.  CT head at [place] showed R frontal IPH, SDH, SAH at 9:00. CTA stroke protocol not performed at [facility]. Patient BIB on 6L NC. Patient actively vomiting without acute complaints of pain. Pt GCS 15 to 14, A&Ox2 on arrival. Patient also vomiting upon arrival.          82y M with PMH HTN, CAD s/p stents, renal cell carcinoma status post left nephrectomy, bladder CA, BPH, CHF, LBBB, vertigo,  HLD, 5.5cm AAA without rupture post EVAR, paroxysmal A-fib on Eliquis, Plavix, and aspirin, early stage Alzheimer dementia transferred from [facility] s/p unwitnessed fall with head strike at home found by wife on floor at 8am. Patient brought to urgent care by wife and had 5 staples to posterior scalp but was instructed to go to nearest ED.  CT head at [place] showed R frontal IPH, SDH, SAH at 9:00. CTA stroke protocol not performed at [facility]. Patient BIB on 6L NC. Patient actively vomiting without acute complaints of pain. Pt GCS 15 to 14, A&Ox2 on arrival. Patient also vomiting upon arrival.          82y M with PMH HTN, CAD s/p stents, renal cell carcinoma status post left nephrectomy, bladder CA, BPH, CHF, LBBB, vertigo,  HLD, 5.5cm AAA without rupture post EVAR, paroxysmal A-fib on Eliquis, Plavix, and aspirin, early stage Alzheimer dementia transferred from [facility] s/p unwitnessed fall with head strike at home found by wife on floor at 8am. Patient brought to urgent care by wife and had 5 staples to posterior scalp but was instructed to go to nearest ED.  CT head at [place] showed R frontal IPH, SDH, SAH at 9:00. CTA stroke protocol not performed at [facility]. Patient BIB on 6L NC.  Pt GCS 15 on arrival, A&Ox2 on arrival. After initial assessment, patient noted to be vomiting enroute to CT scanner.

## 2023-11-10 NOTE — PROGRESS NOTE ADULT - ASSESSMENT
83 yo male POD#0 s/p a right decompressive hemicraniectomy by Dr. Larios    Plan:  -Medical management per NSICU  -Pain Control, avoid over sedation  -CTH in AM  -Goal SBP < 160  -Continue to monitor and record drain output q4 hours  -Keppra for seizure ppx  -Alice-op antibiotics x 3 doses  -Keep HOB < 30 degrees   -Wean to extubate when able  -DVT ppx: SCD's, hold pharmacological DVT ppx until cleared by NSG  -Case and plan discussed with Dr. Larios

## 2023-11-10 NOTE — H&P ADULT - NSICDXPASTMEDICALHX_GEN_ALL_CORE_FT
PAST MEDICAL HISTORY:  CAD (coronary artery disease)     History of abdominal aortic aneurysm (AAA)     Hypertension      PAST MEDICAL HISTORY:  Alzheimers disease     CAD (coronary artery disease)     History of abdominal aortic aneurysm (AAA)     Hypertension

## 2023-11-10 NOTE — CONSULT NOTE ADULT - ASSESSMENT
82M with multicompartment ICH, SAH, flax SDH, 6cm ICH on the right s/p fall on Eliquis intubated for airway protection after vomiting     Plan   - q1 neuro checks   - repeat CT head stat, age indeterminant T1 fx on CT c-spine will get CT chest   - Keppra 500 BID   - -160   - NPO   - SCDs only for dvt ppx, s/p Eliquis reversal at [place], may need more of repeat scan worse   - Further plan pending repeat imaging

## 2023-11-10 NOTE — ED PROVIDER NOTE - CLINICAL SUMMARY MEDICAL DECISION MAKING FREE TEXT BOX
82-year-old male with fall, rule out intracranial trauma, noted to have left facial droop, and left-sided weakness, slurred speech, will activate code stroke, send CBC, CMP, cardiac enzymes, EKG, placed on cardiac monitor, patient on Eliquis may be a candidate for adnexxa  Contact will call neurosurgery

## 2023-11-10 NOTE — ED PROVIDER NOTE - NSICDXFAMILYHX_GEN_ALL_CORE_FT
FAMILY HISTORY:  Father  Still living? Unknown  Family history of early CAD, Age at diagnosis: Age Unknown  Family history of MI (myocardial infarction), Age at diagnosis: Age Unknown    Mother  Still living? Unknown  Family history of early CAD, Age at diagnosis: Age Unknown  Family history of MI (myocardial infarction), Age at diagnosis: Age Unknown

## 2023-11-10 NOTE — ED PROVIDER NOTE - PROGRESS NOTE DETAILS
Spoke with wife Aminta at the bedside, states that she is pretty sure that the patient did not take his medications this morning, believes that the patient took his medications yesterday evening but is not sure of time, patient is on Eliquis 2-1/2 mg twice daily called transfer center, wife requesting [place] transfer called transfer center, wife requesting [place] transfer, transfer center states no beds available and will have to be transferred to [place], spoke with cuong Chiu, will accept patient, approves [facility] treatment EMS is here, blood pressure now systolic 140 is on cardene, will stop for now, patient eyes closed, opens to verbal stimuli, speaking, follows commands, knows name, date of birth

## 2023-11-11 LAB
AMORPH CRY # UR COMP ASSIST: PRESENT
AMORPH CRY # UR COMP ASSIST: PRESENT
ANION GAP SERPL CALC-SCNC: 16 MMOL/L — SIGNIFICANT CHANGE UP (ref 5–17)
ANION GAP SERPL CALC-SCNC: 16 MMOL/L — SIGNIFICANT CHANGE UP (ref 5–17)
APPEARANCE UR: CLEAR — SIGNIFICANT CHANGE UP
APPEARANCE UR: CLEAR — SIGNIFICANT CHANGE UP
BACTERIA # UR AUTO: NEGATIVE /HPF — SIGNIFICANT CHANGE UP
BACTERIA # UR AUTO: NEGATIVE /HPF — SIGNIFICANT CHANGE UP
BILIRUB UR-MCNC: NEGATIVE — SIGNIFICANT CHANGE UP
BILIRUB UR-MCNC: NEGATIVE — SIGNIFICANT CHANGE UP
BUN SERPL-MCNC: 27.9 MG/DL — HIGH (ref 8–20)
BUN SERPL-MCNC: 27.9 MG/DL — HIGH (ref 8–20)
CALCIUM SERPL-MCNC: 8.6 MG/DL — SIGNIFICANT CHANGE UP (ref 8.4–10.5)
CALCIUM SERPL-MCNC: 8.6 MG/DL — SIGNIFICANT CHANGE UP (ref 8.4–10.5)
CAST: 4 /LPF — SIGNIFICANT CHANGE UP (ref 0–4)
CAST: 4 /LPF — SIGNIFICANT CHANGE UP (ref 0–4)
CHLORIDE SERPL-SCNC: 100 MMOL/L — SIGNIFICANT CHANGE UP (ref 96–108)
CHLORIDE SERPL-SCNC: 100 MMOL/L — SIGNIFICANT CHANGE UP (ref 96–108)
CO2 SERPL-SCNC: 18 MMOL/L — LOW (ref 22–29)
CO2 SERPL-SCNC: 18 MMOL/L — LOW (ref 22–29)
COLOR SPEC: YELLOW — SIGNIFICANT CHANGE UP
COLOR SPEC: YELLOW — SIGNIFICANT CHANGE UP
CREAT ?TM UR-MCNC: 151 MG/DL — SIGNIFICANT CHANGE UP
CREAT ?TM UR-MCNC: 151 MG/DL — SIGNIFICANT CHANGE UP
CREAT SERPL-MCNC: 2.61 MG/DL — HIGH (ref 0.5–1.3)
CREAT SERPL-MCNC: 2.61 MG/DL — HIGH (ref 0.5–1.3)
DIFF PNL FLD: ABNORMAL
DIFF PNL FLD: ABNORMAL
EGFR: 24 ML/MIN/1.73M2 — LOW
EGFR: 24 ML/MIN/1.73M2 — LOW
GLUCOSE SERPL-MCNC: 166 MG/DL — HIGH (ref 70–99)
GLUCOSE SERPL-MCNC: 166 MG/DL — HIGH (ref 70–99)
GLUCOSE UR QL: NEGATIVE MG/DL — SIGNIFICANT CHANGE UP
GLUCOSE UR QL: NEGATIVE MG/DL — SIGNIFICANT CHANGE UP
HCT VFR BLD CALC: 30.9 % — LOW (ref 39–50)
HCT VFR BLD CALC: 30.9 % — LOW (ref 39–50)
HGB BLD-MCNC: 11 G/DL — LOW (ref 13–17)
HGB BLD-MCNC: 11 G/DL — LOW (ref 13–17)
KETONES UR-MCNC: ABNORMAL MG/DL
KETONES UR-MCNC: ABNORMAL MG/DL
LACTATE SERPL-SCNC: 1.8 MMOL/L — SIGNIFICANT CHANGE UP (ref 0.5–2)
LACTATE SERPL-SCNC: 1.8 MMOL/L — SIGNIFICANT CHANGE UP (ref 0.5–2)
LEUKOCYTE ESTERASE UR-ACNC: NEGATIVE — SIGNIFICANT CHANGE UP
LEUKOCYTE ESTERASE UR-ACNC: NEGATIVE — SIGNIFICANT CHANGE UP
MAGNESIUM SERPL-MCNC: 2.1 MG/DL — SIGNIFICANT CHANGE UP (ref 1.6–2.6)
MAGNESIUM SERPL-MCNC: 2.1 MG/DL — SIGNIFICANT CHANGE UP (ref 1.6–2.6)
MCHC RBC-ENTMCNC: 31.8 PG — SIGNIFICANT CHANGE UP (ref 27–34)
MCHC RBC-ENTMCNC: 31.8 PG — SIGNIFICANT CHANGE UP (ref 27–34)
MCHC RBC-ENTMCNC: 35.6 GM/DL — SIGNIFICANT CHANGE UP (ref 32–36)
MCHC RBC-ENTMCNC: 35.6 GM/DL — SIGNIFICANT CHANGE UP (ref 32–36)
MCV RBC AUTO: 89.3 FL — SIGNIFICANT CHANGE UP (ref 80–100)
MCV RBC AUTO: 89.3 FL — SIGNIFICANT CHANGE UP (ref 80–100)
MRSA PCR RESULT.: SIGNIFICANT CHANGE UP
MRSA PCR RESULT.: SIGNIFICANT CHANGE UP
NITRITE UR-MCNC: NEGATIVE — SIGNIFICANT CHANGE UP
NITRITE UR-MCNC: NEGATIVE — SIGNIFICANT CHANGE UP
OSMOLALITY UR: 414 MOSM/KG — SIGNIFICANT CHANGE UP (ref 300–1000)
OSMOLALITY UR: 414 MOSM/KG — SIGNIFICANT CHANGE UP (ref 300–1000)
PCP SPEC-MCNC: SIGNIFICANT CHANGE UP
PCP SPEC-MCNC: SIGNIFICANT CHANGE UP
PH UR: 5 — SIGNIFICANT CHANGE UP (ref 5–8)
PH UR: 5 — SIGNIFICANT CHANGE UP (ref 5–8)
PHOSPHATE SERPL-MCNC: 3.1 MG/DL — SIGNIFICANT CHANGE UP (ref 2.4–4.7)
PHOSPHATE SERPL-MCNC: 3.1 MG/DL — SIGNIFICANT CHANGE UP (ref 2.4–4.7)
PLATELET # BLD AUTO: 102 K/UL — LOW (ref 150–400)
PLATELET # BLD AUTO: 102 K/UL — LOW (ref 150–400)
POTASSIUM SERPL-MCNC: 5.5 MMOL/L — HIGH (ref 3.5–5.3)
POTASSIUM SERPL-MCNC: 5.5 MMOL/L — HIGH (ref 3.5–5.3)
POTASSIUM SERPL-SCNC: 5.5 MMOL/L — HIGH (ref 3.5–5.3)
POTASSIUM SERPL-SCNC: 5.5 MMOL/L — HIGH (ref 3.5–5.3)
POTASSIUM UR-SCNC: 80 MMOL/L — SIGNIFICANT CHANGE UP
POTASSIUM UR-SCNC: 80 MMOL/L — SIGNIFICANT CHANGE UP
PROCALCITONIN SERPL-MCNC: 0.76 NG/ML — HIGH (ref 0.02–0.1)
PROCALCITONIN SERPL-MCNC: 0.76 NG/ML — HIGH (ref 0.02–0.1)
PROT ?TM UR-MCNC: 91 MG/DL — HIGH (ref 0–12)
PROT ?TM UR-MCNC: 91 MG/DL — HIGH (ref 0–12)
PROT UR-MCNC: 100 MG/DL
PROT UR-MCNC: 100 MG/DL
PROT/CREAT UR-RTO: 0.6 RATIO — HIGH
PROT/CREAT UR-RTO: 0.6 RATIO — HIGH
RBC # BLD: 3.46 M/UL — LOW (ref 4.2–5.8)
RBC # BLD: 3.46 M/UL — LOW (ref 4.2–5.8)
RBC # FLD: 12.6 % — SIGNIFICANT CHANGE UP (ref 10.3–14.5)
RBC # FLD: 12.6 % — SIGNIFICANT CHANGE UP (ref 10.3–14.5)
RBC CASTS # UR COMP ASSIST: 7 /HPF — HIGH (ref 0–4)
RBC CASTS # UR COMP ASSIST: 7 /HPF — HIGH (ref 0–4)
S AUREUS DNA NOSE QL NAA+PROBE: SIGNIFICANT CHANGE UP
S AUREUS DNA NOSE QL NAA+PROBE: SIGNIFICANT CHANGE UP
SODIUM SERPL-SCNC: 134 MMOL/L — LOW (ref 135–145)
SODIUM SERPL-SCNC: 134 MMOL/L — LOW (ref 135–145)
SODIUM UR-SCNC: <30 MMOL/L — SIGNIFICANT CHANGE UP
SODIUM UR-SCNC: <30 MMOL/L — SIGNIFICANT CHANGE UP
SP GR SPEC: >1.03 — HIGH (ref 1–1.03)
SP GR SPEC: >1.03 — HIGH (ref 1–1.03)
SQUAMOUS # UR AUTO: 2 /HPF — SIGNIFICANT CHANGE UP (ref 0–5)
SQUAMOUS # UR AUTO: 2 /HPF — SIGNIFICANT CHANGE UP (ref 0–5)
UROBILINOGEN FLD QL: 1 MG/DL — SIGNIFICANT CHANGE UP (ref 0.2–1)
UROBILINOGEN FLD QL: 1 MG/DL — SIGNIFICANT CHANGE UP (ref 0.2–1)
WBC # BLD: 13.51 K/UL — HIGH (ref 3.8–10.5)
WBC # BLD: 13.51 K/UL — HIGH (ref 3.8–10.5)
WBC # FLD AUTO: 13.51 K/UL — HIGH (ref 3.8–10.5)
WBC # FLD AUTO: 13.51 K/UL — HIGH (ref 3.8–10.5)
WBC UR QL: 5 /HPF — SIGNIFICANT CHANGE UP (ref 0–5)
WBC UR QL: 5 /HPF — SIGNIFICANT CHANGE UP (ref 0–5)

## 2023-11-11 PROCEDURE — 71045 X-RAY EXAM CHEST 1 VIEW: CPT | Mod: 26

## 2023-11-11 PROCEDURE — 99291 CRITICAL CARE FIRST HOUR: CPT

## 2023-11-11 PROCEDURE — 93306 TTE W/DOPPLER COMPLETE: CPT | Mod: 26

## 2023-11-11 RX ORDER — ACETAMINOPHEN 500 MG
1000 TABLET ORAL ONCE
Refills: 0 | Status: COMPLETED | OUTPATIENT
Start: 2023-11-11 | End: 2023-11-11

## 2023-11-11 RX ORDER — POLYETHYLENE GLYCOL 3350 17 G/17G
17 POWDER, FOR SOLUTION ORAL DAILY
Refills: 0 | Status: DISCONTINUED | OUTPATIENT
Start: 2023-11-11 | End: 2023-11-27

## 2023-11-11 RX ORDER — OXYCODONE HYDROCHLORIDE 5 MG/1
10 TABLET ORAL EVERY 4 HOURS
Refills: 0 | Status: DISCONTINUED | OUTPATIENT
Start: 2023-11-11 | End: 2023-11-17

## 2023-11-11 RX ORDER — SODIUM CHLORIDE 9 MG/ML
1000 INJECTION INTRAMUSCULAR; INTRAVENOUS; SUBCUTANEOUS
Refills: 0 | Status: DISCONTINUED | OUTPATIENT
Start: 2023-11-11 | End: 2023-11-12

## 2023-11-11 RX ORDER — SODIUM BICARBONATE 1 MEQ/ML
650 SYRINGE (ML) INTRAVENOUS THREE TIMES A DAY
Refills: 0 | Status: DISCONTINUED | OUTPATIENT
Start: 2023-11-11 | End: 2023-11-16

## 2023-11-11 RX ORDER — SODIUM ZIRCONIUM CYCLOSILICATE 10 G/10G
10 POWDER, FOR SUSPENSION ORAL ONCE
Refills: 0 | Status: DISCONTINUED | OUTPATIENT
Start: 2023-11-11 | End: 2023-11-11

## 2023-11-11 RX ORDER — OXYCODONE HYDROCHLORIDE 5 MG/1
5 TABLET ORAL EVERY 4 HOURS
Refills: 0 | Status: DISCONTINUED | OUTPATIENT
Start: 2023-11-11 | End: 2023-11-17

## 2023-11-11 RX ORDER — FENTANYL CITRATE 50 UG/ML
25 INJECTION INTRAVENOUS ONCE
Refills: 0 | Status: DISCONTINUED | OUTPATIENT
Start: 2023-11-11 | End: 2023-11-11

## 2023-11-11 RX ORDER — SODIUM CHLORIDE 9 MG/ML
250 INJECTION INTRAMUSCULAR; INTRAVENOUS; SUBCUTANEOUS ONCE
Refills: 0 | Status: COMPLETED | OUTPATIENT
Start: 2023-11-11 | End: 2023-11-11

## 2023-11-11 RX ORDER — SODIUM BICARBONATE 1 MEQ/ML
650 SYRINGE (ML) INTRAVENOUS THREE TIMES A DAY
Refills: 0 | Status: DISCONTINUED | OUTPATIENT
Start: 2023-11-11 | End: 2023-11-11

## 2023-11-11 RX ORDER — SENNA PLUS 8.6 MG/1
2 TABLET ORAL AT BEDTIME
Refills: 0 | Status: DISCONTINUED | OUTPATIENT
Start: 2023-11-11 | End: 2023-11-27

## 2023-11-11 RX ORDER — SODIUM ZIRCONIUM CYCLOSILICATE 10 G/10G
10 POWDER, FOR SUSPENSION ORAL ONCE
Refills: 0 | Status: COMPLETED | OUTPATIENT
Start: 2023-11-11 | End: 2023-11-11

## 2023-11-11 RX ORDER — ACETAMINOPHEN 500 MG
1000 TABLET ORAL ONCE
Refills: 0 | Status: COMPLETED | OUTPATIENT
Start: 2023-11-11

## 2023-11-11 RX ADMIN — FENTANYL CITRATE 25 MICROGRAM(S): 50 INJECTION INTRAVENOUS at 21:16

## 2023-11-11 RX ADMIN — FENTANYL CITRATE 25 MICROGRAM(S): 50 INJECTION INTRAVENOUS at 21:01

## 2023-11-11 RX ADMIN — Medication 10 MILLIGRAM(S): at 13:05

## 2023-11-11 RX ADMIN — CHLORHEXIDINE GLUCONATE 15 MILLILITER(S): 213 SOLUTION TOPICAL at 17:15

## 2023-11-11 RX ADMIN — Medication 400 MILLIGRAM(S): at 16:27

## 2023-11-11 RX ADMIN — SENNA PLUS 2 TABLET(S): 8.6 TABLET ORAL at 23:12

## 2023-11-11 RX ADMIN — SODIUM CHLORIDE 250 MILLILITER(S): 9 INJECTION INTRAMUSCULAR; INTRAVENOUS; SUBCUTANEOUS at 06:14

## 2023-11-11 RX ADMIN — Medication 10 MILLIGRAM(S): at 06:16

## 2023-11-11 RX ADMIN — SODIUM ZIRCONIUM CYCLOSILICATE 10 GRAM(S): 10 POWDER, FOR SUSPENSION ORAL at 15:35

## 2023-11-11 RX ADMIN — Medication 10 MILLIGRAM(S): at 07:25

## 2023-11-11 RX ADMIN — CHLORHEXIDINE GLUCONATE 1 APPLICATION(S): 213 SOLUTION TOPICAL at 05:21

## 2023-11-11 RX ADMIN — CHLORHEXIDINE GLUCONATE 15 MILLILITER(S): 213 SOLUTION TOPICAL at 05:20

## 2023-11-11 RX ADMIN — Medication 650 MILLIGRAM(S): at 23:07

## 2023-11-11 RX ADMIN — SODIUM CHLORIDE 60 MILLILITER(S): 9 INJECTION INTRAMUSCULAR; INTRAVENOUS; SUBCUTANEOUS at 23:13

## 2023-11-11 RX ADMIN — LEVETIRACETAM 400 MILLIGRAM(S): 250 TABLET, FILM COATED ORAL at 17:14

## 2023-11-11 RX ADMIN — Medication 400 MILLIGRAM(S): at 04:42

## 2023-11-11 RX ADMIN — LEVETIRACETAM 400 MILLIGRAM(S): 250 TABLET, FILM COATED ORAL at 05:21

## 2023-11-11 RX ADMIN — Medication 1000 MILLIGRAM(S): at 05:39

## 2023-11-11 RX ADMIN — Medication 10 MILLIGRAM(S): at 15:35

## 2023-11-11 RX ADMIN — Medication 1000 MILLIGRAM(S): at 16:52

## 2023-11-11 RX ADMIN — Medication 400 MILLIGRAM(S): at 23:52

## 2023-11-11 RX ADMIN — SODIUM CHLORIDE 30 MILLILITER(S): 9 INJECTION INTRAMUSCULAR; INTRAVENOUS; SUBCUTANEOUS at 06:15

## 2023-11-11 NOTE — PROGRESS NOTE ADULT - SUBJECTIVE AND OBJECTIVE BOX
Detail Level: Detailed Birth Control Pills Pregnancy And Lactation Text: This medication should be avoided if pregnant and for the first 30 days post-partum. HPI:  82y M with PMH HTN, CAD s/p stents, renal cell carcinoma status post left nephrectomy, bladder CA, BPH, CHF, LBBB, vertigo,  HLD, 5.5cm AAA without rupture post EVAR, paroxysmal A-fib on Eliquis, Plavix, and aspirin, early stage Alzheimer dementia transferred from [facility] s/p unwitnessed fall with head strike at home found by wife on floor at 8am. Patient brought to urgent care by wife and had 5 staples to posterior scalp but was instructed to go to nearest ED.  CT head at [place] showed R frontal IPH, SDH, SAH at 9:00. CTA stroke protocol not performed at [facility]. Patient BIB on 6L NC.  Pt GCS 15 on arrival, A&Ox2 on arrival. After initial assessment, patient noted to be vomiting enroute to CT scanner.    (10 Nov 2023 11:04)    INTERVAL HPI:  11/10 S/p right decompressive hemicraniectomy     OVERNIGHT EVENTS:  None    POD#1    83 yo male intubated and unable to provide complaints.       Vital Signs Last 24 Hrs  T(C): 38.1 (11 Nov 2023 00:00), Max: 38.4 (10 Nov 2023 21:00)  T(F): 100.6 (11 Nov 2023 00:00), Max: 101.1 (10 Nov 2023 21:00)  HR: 60 (11 Nov 2023 00:26) (59 - 90)  BP: 142/68 (11 Nov 2023 00:00) (136/69 - 160/95)  BP(mean): 74 (11 Nov 2023 00:00) (74 - 110)  RR: 20 (11 Nov 2023 00:00) (16 - 22)  SpO2: 100% (11 Nov 2023 00:26) (100% - 100%)    Parameters below as of 11 Nov 2023 00:00  Patient On (Oxygen Delivery Method): ventilator    PHYSICAL EXAM:  GENERAL: NAD  HEAD:  Right hemicraniectomy site soft, dressing clean, dry and intact   DRAINS: In place and draining well   WOUND: Dressing clean dry intact  MENTAL STATUS: Lethargic but opens his eyes to voice, pupils 3mmR, intubated, following commands  CRANIAL NERVES: PERRLA  MOTOR: Right upper and lower extremity antigravity, following commands on the right upper extremity, withdraws the left upper and lower extremity to pain  CHEST/LUNG: Intubated  SKIN: Warm, dry; no rashes or lesions      LABS:                        8.4    8.59  )-----------( 95       ( 10 Nov 2023 18:30 )             23.9     11-10    134<L>  |  102  |  22.1<H>  ----------------------------<  184<H>  4.8   |  19.0<L>  |  1.48<H>    Ca    8.6      10 Nov 2023 18:30  Phos  2.0     11-10  Mg     1.4     11-10    TPro  5.9<L>  /  Alb  3.5  /  TBili  1.0  /  DBili  x   /  AST  16  /  ALT  12  /  AlkPhos  89  11-10    PT/INR - ( 10 Nov 2023 11:00 )   PT: 11.5 sec;   INR: 1.04 ratio         PTT - ( 10 Nov 2023 11:00 )  PTT:30.8 sec  Urinalysis Basic - ( 10 Nov 2023 18:30 )    Color: x / Appearance: x / SG: x / pH: x  Gluc: 184 mg/dL / Ketone: x  / Bili: x / Urobili: x   Blood: x / Protein: x / Nitrite: x   Leuk Esterase: x / RBC: x / WBC x   Sq Epi: x / Non Sq Epi: x / Bacteria: x  11-10 @ 07:01  -  11-11 @ 00:43  --------------------------------------------------------  IN: 200 mL / OUT: 1010 mL / NET: -810 mL    RADIOLOGY & ADDITIONAL TESTS:  Post op CTH: Evacuation of Right IPH, trace residual hemorrhage, improvement in mass effect and shift, s/p right craniectomy, official report pending  Isotretinoin Counseling: Patient should get monthly blood tests, not donate blood, not drive at night if vision affected, not share medication, and not undergo elective surgery for 6 months after tx completed. Side effects reviewed, pt to contact office should one occur. Sarecycline Pregnancy And Lactation Text: This medication is Pregnancy Category D and not consider safe during pregnancy. It is also excreted in breast milk. Bactrim Pregnancy And Lactation Text: This medication is Pregnancy Category D and is known to cause fetal risk.  It is also excreted in breast milk. Azelaic Acid Counseling: Patient counseled that medicine may cause skin irritation and to avoid applying near the eyes.  In the event of skin irritation, the patient was advised to reduce the amount of the drug applied or use it less frequently.   The patient verbalized understanding of the proper use and possible adverse effects of azelaic acid.  All of the patient's questions and concerns were addressed. Topical Retinoid counseling:  Patient advised to apply a pea-sized amount only at bedtime and wait 30 minutes after washing their face before applying.  If too drying, patient may add a non-comedogenic moisturizer. The patient verbalized understanding of the proper use and possible adverse effects of retinoids.  All of the patient's questions and concerns were addressed. Topical Sulfur Applications Pregnancy And Lactation Text: This medication is Pregnancy Category C and has an unknown safety profile during pregnancy. It is unknown if this topical medication is excreted in breast milk. Benzoyl Peroxide Counseling: Patient counseled that medicine may cause skin irritation and bleach clothing.  In the event of skin irritation, the patient was advised to reduce the amount of the drug applied or use it less frequently.   The patient verbalized understanding of the proper use and possible adverse effects of benzoyl peroxide.  All of the patient's questions and concerns were addressed. High Dose Vitamin A Counseling: Side effects reviewed, pt to contact office should one occur. Topical Clindamycin Pregnancy And Lactation Text: This medication is Pregnancy Category B and is considered safe during pregnancy. It is unknown if it is excreted in breast milk. Spironolactone Pregnancy And Lactation Text: This medication can cause feminization of the male fetus and should be avoided during pregnancy. The active metabolite is also found in breast milk. Winlevi Counseling:  I discussed with the patient the risks of topical clascoterone including but not limited to erythema, scaling, itching, and stinging. Patient voiced their understanding. Topical Retinoid Pregnancy And Lactation Text: This medication is Pregnancy Category C. It is unknown if this medication is excreted in breast milk. Doxycycline Counseling:  Patient counseled regarding possible photosensitivity and increased risk for sunburn.  Patient instructed to avoid sunlight, if possible.  When exposed to sunlight, patients should wear protective clothing, sunglasses, and sunscreen.  The patient was instructed to call the office immediately if the following severe adverse effects occur:  hearing changes, easy bruising/bleeding, severe headache, or vision changes.  The patient verbalized understanding of the proper use and possible adverse effects of doxycycline.  All of the patient's questions and concerns were addressed. Aklief counseling:  Patient advised to apply a pea-sized amount only at bedtime and wait 30 minutes after washing their face before applying.  If too drying, patient may add a non-comedogenic moisturizer.  The most commonly reported side effects including irritation, redness, scaling, dryness, stinging, burning, itching, and increased risk of sunburn.  The patient verbalized understanding of the proper use and possible adverse effects of retinoids.  All of the patient's questions and concerns were addressed. Azithromycin Pregnancy And Lactation Text: This medication is considered safe during pregnancy and is also secreted in breast milk. High Dose Vitamin A Pregnancy And Lactation Text: High dose vitamin A therapy is contraindicated during pregnancy and breast feeding. Tetracycline Counseling: Patient counseled regarding possible photosensitivity and increased risk for sunburn.  Patient instructed to avoid sunlight, if possible.  When exposed to sunlight, patients should wear protective clothing, sunglasses, and sunscreen.  The patient was instructed to call the office immediately if the following severe adverse effects occur:  hearing changes, easy bruising/bleeding, severe headache, or vision changes.  The patient verbalized understanding of the proper use and possible adverse effects of tetracycline.  All of the patient's questions and concerns were addressed. Patient understands to avoid pregnancy while on therapy due to potential birth defects. Include Pregnancy/Lactation Warning?: No Tazorac Pregnancy And Lactation Text: This medication is not safe during pregnancy. It is unknown if this medication is excreted in breast milk. Erythromycin Counseling:  I discussed with the patient the risks of erythromycin including but not limited to GI upset, allergic reaction, drug rash, diarrhea, increase in liver enzymes, and yeast infections. Topical Clindamycin Counseling: Patient counseled that this medication may cause skin irritation or allergic reactions.  In the event of skin irritation, the patient was advised to reduce the amount of the drug applied or use it less frequently.   The patient verbalized understanding of the proper use and possible adverse effects of clindamycin.  All of the patient's questions and concerns were addressed. Azelaic Acid Pregnancy And Lactation Text: This medication is considered safe during pregnancy and breast feeding. Spironolactone Counseling: Patient advised regarding risks of diarrhea, abdominal pain, hyperkalemia, birth defects (for female patients), liver toxicity and renal toxicity. The patient may need blood work to monitor liver and kidney function and potassium levels while on therapy. The patient verbalized understanding of the proper use and possible adverse effects of spironolactone.  All of the patient's questions and concerns were addressed. Isotretinoin Pregnancy And Lactation Text: This medication is Pregnancy Category X and is considered extremely dangerous during pregnancy. It is unknown if it is excreted in breast milk. Birth Control Pills Counseling: Birth Control Pill Counseling: I discussed with the patient the potential side effects of OCPs including but not limited to increased risk of stroke, heart attack, thrombophlebitis, deep venous thrombosis, hepatic adenomas, breast changes, GI upset, headaches, and depression.  The patient verbalized understanding of the proper use and possible adverse effects of OCPs. All of the patient's questions and concerns were addressed. Erythromycin Pregnancy And Lactation Text: This medication is Pregnancy Category B and is considered safe during pregnancy. It is also excreted in breast milk. Dapsone Counseling: I discussed with the patient the risks of dapsone including but not limited to hemolytic anemia, agranulocytosis, rashes, methemoglobinemia, kidney failure, peripheral neuropathy, headaches, GI upset, and liver toxicity.  Patients who start dapsone require monitoring including baseline LFTs and weekly CBCs for the first month, then every month thereafter.  The patient verbalized understanding of the proper use and possible adverse effects of dapsone.  All of the patient's questions and concerns were addressed. Benzoyl Peroxide Pregnancy And Lactation Text: This medication is Pregnancy Category C. It is unknown if benzoyl peroxide is excreted in breast milk. Topical Sulfur Applications Counseling: Topical Sulfur Counseling: Patient counseled that this medication may cause skin irritation or allergic reactions.  In the event of skin irritation, the patient was advised to reduce the amount of the drug applied or use it less frequently.   The patient verbalized understanding of the proper use and possible adverse effects of topical sulfur application.  All of the patient's questions and concerns were addressed. Doxycycline Pregnancy And Lactation Text: This medication is Pregnancy Category D and not consider safe during pregnancy. It is also excreted in breast milk but is considered safe for shorter treatment courses. Minocycline Counseling: Patient advised regarding possible photosensitivity and discoloration of the teeth, skin, lips, tongue and gums.  Patient instructed to avoid sunlight, if possible.  When exposed to sunlight, patients should wear protective clothing, sunglasses, and sunscreen.  The patient was instructed to call the office immediately if the following severe adverse effects occur:  hearing changes, easy bruising/bleeding, severe headache, or vision changes.  The patient verbalized understanding of the proper use and possible adverse effects of minocycline.  All of the patient's questions and concerns were addressed. Azithromycin Counseling:  I discussed with the patient the risks of azithromycin including but not limited to GI upset, allergic reaction, drug rash, diarrhea, and yeast infections. Dapsone Pregnancy And Lactation Text: This medication is Pregnancy Category C and is not considered safe during pregnancy or breast feeding. Bactrim Counseling:  I discussed with the patient the risks of sulfa antibiotics including but not limited to GI upset, allergic reaction, drug rash, diarrhea, dizziness, photosensitivity, and yeast infections.  Rarely, more serious reactions can occur including but not limited to aplastic anemia, agranulocytosis, methemoglobinemia, blood dyscrasias, liver or kidney failure, lung infiltrates or desquamative/blistering drug rashes. Tazorac Counseling:  Patient advised that medication is irritating and drying.  Patient may need to apply sparingly and wash off after an hour before eventually leaving it on overnight.  The patient verbalized understanding of the proper use and possible adverse effects of tazorac.  All of the patient's questions and concerns were addressed. Winlevi Pregnancy And Lactation Text: This medication is considered safe during pregnancy and breastfeeding. Aklief Pregnancy And Lactation Text: It is unknown if this medication is safe to use during pregnancy.  It is unknown if this medication is excreted in breast milk.  Breastfeeding women should use the topical cream on the smallest area of the skin for the shortest time needed while breastfeeding.  Do not apply to nipple and areola. Sarecycline Counseling: Patient advised regarding possible photosensitivity and discoloration of the teeth, skin, lips, tongue and gums.  Patient instructed to avoid sunlight, if possible.  When exposed to sunlight, patients should wear protective clothing, sunglasses, and sunscreen.  The patient was instructed to call the office immediately if the following severe adverse effects occur:  hearing changes, easy bruising/bleeding, severe headache, or vision changes.  The patient verbalized understanding of the proper use and possible adverse effects of sarecycline.  All of the patient's questions and concerns were addressed.

## 2023-11-11 NOTE — PROGRESS NOTE ADULT - SUBJECTIVE AND OBJECTIVE BOX
Chief complaint:   Patient is a 82y old  Male who presents with a chief complaint of s/p unwitnessed fall with head strike (11 Nov 2023 11:05)    HPI:  82y M with PMH HTN, CAD s/p stents, renal cell carcinoma status post left nephrectomy, bladder CA, BPH, CHF, LBBB, vertigo,  HLD, 5.5cm AAA without rupture post EVAR, paroxysmal A-fib on Eliquis, Plavix, and aspirin, early stage Alzheimer dementia transferred from [facility] s/p unwitnessed fall with head strike at home found by wife on floor at 8am. Patient brought to urgent care by wife and had 5 staples to posterior scalp but was instructed to go to nearest ED.  CT head at [place] showed R frontal IPH, SDH, SAH at 9:00. CTA stroke protocol not performed at [facility]. Patient BIB on 6L NC.  Pt GCS 15 on arrival, A&Ox2 on arrival. After initial assessment, patient noted to be vomiting enroute to CT scanner.          (10 Nov 2023 11:04)        24hr EVENTS:  11/10 - OR for decompression.  11/11 - Tolerating CPAP    ROS: [ ]  Unable to assess due to mental status   All other systems negative    -----------------------------------------------------------------------------------------------------------------------------------------------------------------------------------    PHYSICAL EXAM:  General: intubated  HEENT: MMM  Neuro:  -Mental status- No acute distress  -CN- PERRL 3mm, EOMI, tongue midline, L facial  - Moving R side spontaneously and localizing    CV: RRR  Pulm: clear to auscultation  Abd: Soft, nontender, nondistended  Ext: no noted edema in lower ext  Skin: warm, dry        -----------------------------------------------------------------------------------------------------------------------------------------------------------------------------------  ICU Vital Signs Last 24 Hrs  T(C): 37.8 (11 Nov 2023 11:00), Max: 38.4 (10 Nov 2023 21:00)  T(F): 100 (11 Nov 2023 11:00), Max: 101.1 (10 Nov 2023 21:00)  HR: 65 (11 Nov 2023 11:47) (55 - 99)  BP: 159/63 (11 Nov 2023 11:00) (112/79 - 168/67)  BP(mean): 92 (11 Nov 2023 11:00) (74 - 110)  ABP: --  ABP(mean): --  RR: 20 (11 Nov 2023 11:00) (16 - 22)  SpO2: 100% (11 Nov 2023 11:47) (100% - 100%)    O2 Parameters below as of 11 Nov 2023 12:00  Patient On (Oxygen Delivery Method): ventilator    O2 Concentration (%): 40        I&O's Summary    10 Nov 2023 07:01  -  11 Nov 2023 07:00  --------------------------------------------------------  IN: 710 mL / OUT: 1155 mL / NET: -445 mL    11 Nov 2023 07:01  -  11 Nov 2023 13:03  --------------------------------------------------------  IN: 180 mL / OUT: 120 mL / NET: 60 mL        MEDICATIONS  (STANDING):  chlorhexidine 0.12% Liquid 15 milliLiter(s) Oral Mucosa every 12 hours  chlorhexidine 2% Cloths 1 Application(s) Topical <User Schedule>  diphtheria/tetanus/pertussis (acellular) Vaccine (Adacel) 0.5 milliLiter(s) IntraMuscular once  levETIRAcetam  IVPB 500 milliGRAM(s) IV Intermittent every 12 hours  sodium bicarbonate 650 milliGRAM(s) Oral three times a day  sodium chloride 0.9%. 1000 milliLiter(s) (60 mL/Hr) IV Continuous <Continuous>  sodium zirconium cyclosilicate 10 Gram(s) Oral once      RESPIRATORY:  Mode: CPAP with PS  FiO2: 40  PEEP: 6  PS: 8  MAP: 9  PIP: 15        IMAGING:   Recent imaging studies were reviewed.    LAB RESULTS:                          11.0   13.51 )-----------( 102      ( 11 Nov 2023 03:25 )             30.9       PT/INR - ( 10 Nov 2023 11:00 )   PT: 11.5 sec;   INR: 1.04 ratio         PTT - ( 10 Nov 2023 11:00 )  PTT:30.8 sec    11-11    134<L>  |  100  |  27.9<H>  ----------------------------<  166<H>  5.5<H>   |  18.0<L>  |  2.61<H>    Ca    8.6      11 Nov 2023 03:25  Phos  3.1     11-11  Mg     2.1     11-11    TPro  5.9<L>  /  Alb  3.5  /  TBili  1.0  /  DBili  x   /  AST  16  /  ALT  12  /  AlkPhos  89  11-10          ABG - ( 10 Nov 2023 12:26 )  pH, Arterial: 7.390 pH, Blood: x     /  pCO2: 42    /  pO2: 386   / HCO3: 25    / Base Excess: 0.4   /  SaO2: 100.0

## 2023-11-11 NOTE — CONSULT NOTE ADULT - SUBJECTIVE AND OBJECTIVE BOX
Patient is a 82y old  Male who presents with a chief complaint of s/p unwitnessed fall with head strike (2023 00:43)       HPI:  82y M with PMH HTN, CAD s/p stents, renal cell carcinoma status post left nephrectomy, bladder CA, BPH, CHF, LBBB, vertigo,  HLD, 5.5cm AAA without rupture post EVAR, paroxysmal A-fib on Eliquis, Plavix, and aspirin, early stage Alzheimer dementia transferred from [facility] s/p unwitnessed fall with head strike at home found by wife on floor at 8am. Patient brought to urgent care by wife and had 5 staples to posterior scalp but was instructed to go to nearest ED.  CT head at [place] showed R frontal IPH, SDH, SAH at 9:00. CTA stroke protocol not performed at [facility]. Patient BIB on 6L NC.  Pt GCS 15 on arrival, A&Ox2 on arrival. After initial assessment, patient noted to be vomiting enroute to CT scanner.     Renal consulted for DONNIE. S/p contrast exposure          (10 Nov 2023 11:04)       PAST MEDICAL & SURGICAL HISTORY:  Hypertension      History of abdominal aortic aneurysm (AAA)      CAD (coronary artery disease)      Alzheimers disease           FAMILY HISTORY:  NC    Social History:Non smoker    MEDICATIONS  (STANDING):  chlorhexidine 0.12% Liquid 15 milliLiter(s) Oral Mucosa every 12 hours  chlorhexidine 2% Cloths 1 Application(s) Topical <User Schedule>  diphtheria/tetanus/pertussis (acellular) Vaccine (Adacel) 0.5 milliLiter(s) IntraMuscular once  levETIRAcetam  IVPB 500 milliGRAM(s) IV Intermittent every 12 hours  sodium chloride 0.9%. 1000 milliLiter(s) (30 mL/Hr) IV Continuous <Continuous>    MEDICATIONS  (PRN):  hydrALAZINE Injectable 10 milliGRAM(s) IV Push every 2 hours PRN SBP>160  labetalol Injectable 10 milliGRAM(s) IV Push every 2 hours PRN SBP>160   Meds reviewed    Allergies    Allergy Status Unknown    Intolerances         REVIEW OF SYSTEMS: NA      Vital Signs Last 24 Hrs  T(C): 37.7 (2023 10:00), Max: 38.4 (10 Nov 2023 21:00)  T(F): 99.9 (2023 10:00), Max: 101.1 (10 Nov 2023 21:00)  HR: 83 (2023 10:00) (55 - 99)  BP: 159/78 (2023 10:00) (112/79 - 168/67)  BP(mean): 101 (2023 10:00) (74 - 110)  RR: 20 (2023 10:00) (16 - 22)  SpO2: 100% (2023 10:00) (100% - 100%)    Parameters below as of 2023 08:00  Patient On (Oxygen Delivery Method): ventilator    O2 Concentration (%): 40  Daily Height in cm: 170.18 (10 Nov 2023 12:01)    Daily Weight in k (2023 03:00)    PHYSICAL EXAM:    GENERAL: +ETT  HEAD:  Right hemicraniectomy site soft, dressing clean, dry and intact   DRAINS: In place and draining well   WOUND: Dressing clean dry intact  MENTAL STATUS: Lethargic but opens his eyes to voice, pupils 3mmR, intubated, following commands  CRANIAL NERVES: PERRLA  MOTOR: Right upper and lower extremity antigravity, following commands on the right upper extremity, withdraws the left upper and lower extremity to pain  CHEST/LUNG: Intubated  SKIN: Warm, dry; no rashes or lesions    LABS:                        11.0   13.51 )-----------( 102      ( 2023 03:25 )             30.9     11-11    134<L>  |  100  |  27.9<H>  ----------------------------<  166<H>  5.5<H>   |  18.0<L>  |  2.61<H>    Ca    8.6      2023 03:25  Phos  3.1     11-11  Mg     2.1     11-11    TPro  5.9<L>  /  Alb  3.5  /  TBili  1.0  /  DBili  x   /  AST  16  /  ALT  12  /  AlkPhos  89  11-10    PT/INR - ( 10 Nov 2023 11:00 )   PT: 11.5 sec;   INR: 1.04 ratio         PTT - ( 10 Nov 2023 11:00 )  PTT:30.8 sec  Urinalysis Basic - ( 2023 03:25 )    Color: x / Appearance: x / SG: x / pH: x  Gluc: 166 mg/dL / Ketone: x  / Bili: x / Urobili: x   Blood: x / Protein: x / Nitrite: x   Leuk Esterase: x / RBC: x / WBC x   Sq Epi: x / Non Sq Epi: x / Bacteria: x      Magnesium: 2.1 mg/dL ( @ 03:25)  Phosphorus: 3.1 mg/dL ( @ 03:25)  Magnesium: 1.4 mg/dL (11-10 @ 18:30)  Magnesium: 1.1 mg/dL (11-10 @ 12:30)  Phosphorus: 2.0 mg/dL (11-10 @ 12:30)    ABG - ( 10 Nov 2023 12:26 )  pH, Arterial: 7.390 pH, Blood: x     /  pCO2: 42    /  pO2: 386   / HCO3: 25    / Base Excess: 0.4   /  SaO2: 100.0                 RADIOLOGY & ADDITIONAL TESTS:

## 2023-11-11 NOTE — PROGRESS NOTE ADULT - ASSESSMENT
Chief complaint:   Patient is a 82y old  Male who presents with a chief complaint of s/p unwitnessed fall with head strike (11 Nov 2023 11:05)      This Patient is critically Ill due to the following diagnoses:  ICH sp Craniectomy  Respiratory Failure    NEURO  #Traumatic ICH  ISO DAPT with AC  - Neurochecks  - SBP <160  - HOLD AC/APA  - Ppx Keppra for 7 days    CV  SBP Goal: <160  CAD: resume ASA when NSGY safe  TTE    PULM  #Vent-Dependent Respiratory Failure  - Previous episodes of apnea 2/2 overventilation on AC  - Continue CPAP as tolerated  - SpO2 > 92%  - NPO midnight for possible extubation    GI  TF via OGT  Bowel Regemin    ID  Trend Fever/WBC    RENAL  #DONNIE  Previous nephrectomy, likely 2/2 ROXANNE +/- intraoperative stress  - Monitor, avoid nephrotoxic meds    ENDO  Glucose <180 with LSS    HEME  VTE Ppx: SCD, HOLD SQL today    DISPO: ICU    My full attention was spent providing medically necessary critical care to the patient with details documented in my note above.   Critical care time spent examining patient, reviewing vitals, labs, medications, imaging and discussing with the team goals of care   The combined critical care time provided to the patient was between 60 minutes  This time does not include bedside procedures that are documented separately.

## 2023-11-11 NOTE — CONSULT NOTE ADULT - ASSESSMENT
DONNIE  Acquired solitary kidney with RCC s/p nephrectomy   Hyperkalemia  Metabolic acidosis  IPH s/p a right decompressive hemicraniectom    -DONNIE with nephrotoxic ATN due to ROXANNE  -ROXANNE will have run its course. In the mean time, avoid further nephroxins. Keep MAP>65  -Urine studies  -IVF  -Patient had h/o DONNIE with contrast, requiring brief dialysis  -No indication for urgent dialysis at this time. Monitor closely   -Lokelma 10 grams po x 1  -PO bicarb     D/w family

## 2023-11-11 NOTE — PROGRESS NOTE ADULT - NS ATTEND AMEND GEN_ALL_CORE FT
Neurosurgery Attending Attestation:    Patient seen and examined at bedside. Agree with plan and note as documented above.    POD#1 s/p decompressive craniectomy and hematoma evacuation. FC in RUE and RLE. Trace movement in LUE and LLE. Care per NCCU.    -Marty Larios MD

## 2023-11-11 NOTE — PROGRESS NOTE ADULT - ASSESSMENT
83 yo male POD#1 s/p a right decompressive hemicraniectomy by Dr. Larios    Plan:  -Medical management per NSICU  -Pain Control, avoid over sedation  -Official report of CTH pending  -Goal SBP < 160  -Continue to monitor and record drain output q4 hours  -Keppra for seizure ppx  -Alice-op antibiotics x 3 doses  -Keep HOB < 30 degrees   -Wean to extubate when able  -DVT ppx: SCD's, hold pharmacological DVT ppx until cleared by NSG  -Case and plan discussed with Dr. Larios

## 2023-11-12 LAB
ALBUMIN SERPL ELPH-MCNC: 3 G/DL — LOW (ref 3.3–5.2)
ALBUMIN SERPL ELPH-MCNC: 3 G/DL — LOW (ref 3.3–5.2)
ALP SERPL-CCNC: 69 U/L — SIGNIFICANT CHANGE UP (ref 40–120)
ALP SERPL-CCNC: 69 U/L — SIGNIFICANT CHANGE UP (ref 40–120)
ALT FLD-CCNC: 21 U/L — SIGNIFICANT CHANGE UP
ALT FLD-CCNC: 21 U/L — SIGNIFICANT CHANGE UP
ANION GAP SERPL CALC-SCNC: 14 MMOL/L — SIGNIFICANT CHANGE UP (ref 5–17)
ANION GAP SERPL CALC-SCNC: 14 MMOL/L — SIGNIFICANT CHANGE UP (ref 5–17)
ANION GAP SERPL CALC-SCNC: 15 MMOL/L — SIGNIFICANT CHANGE UP (ref 5–17)
ANION GAP SERPL CALC-SCNC: 15 MMOL/L — SIGNIFICANT CHANGE UP (ref 5–17)
AST SERPL-CCNC: 42 U/L — HIGH
AST SERPL-CCNC: 42 U/L — HIGH
BILIRUB SERPL-MCNC: 0.8 MG/DL — SIGNIFICANT CHANGE UP (ref 0.4–2)
BILIRUB SERPL-MCNC: 0.8 MG/DL — SIGNIFICANT CHANGE UP (ref 0.4–2)
BUN SERPL-MCNC: 50 MG/DL — HIGH (ref 8–20)
BUN SERPL-MCNC: 50 MG/DL — HIGH (ref 8–20)
BUN SERPL-MCNC: 60 MG/DL — HIGH (ref 8–20)
BUN SERPL-MCNC: 60 MG/DL — HIGH (ref 8–20)
CALCIUM SERPL-MCNC: 8 MG/DL — LOW (ref 8.4–10.5)
CALCIUM SERPL-MCNC: 8 MG/DL — LOW (ref 8.4–10.5)
CALCIUM SERPL-MCNC: 8.4 MG/DL — SIGNIFICANT CHANGE UP (ref 8.4–10.5)
CALCIUM SERPL-MCNC: 8.4 MG/DL — SIGNIFICANT CHANGE UP (ref 8.4–10.5)
CHLORIDE SERPL-SCNC: 100 MMOL/L — SIGNIFICANT CHANGE UP (ref 96–108)
CHLORIDE SERPL-SCNC: 100 MMOL/L — SIGNIFICANT CHANGE UP (ref 96–108)
CHLORIDE SERPL-SCNC: 101 MMOL/L — SIGNIFICANT CHANGE UP (ref 96–108)
CHLORIDE SERPL-SCNC: 101 MMOL/L — SIGNIFICANT CHANGE UP (ref 96–108)
CO2 SERPL-SCNC: 18 MMOL/L — LOW (ref 22–29)
CO2 SERPL-SCNC: 18 MMOL/L — LOW (ref 22–29)
CO2 SERPL-SCNC: 20 MMOL/L — LOW (ref 22–29)
CO2 SERPL-SCNC: 20 MMOL/L — LOW (ref 22–29)
CREAT SERPL-MCNC: 3.98 MG/DL — HIGH (ref 0.5–1.3)
CREAT SERPL-MCNC: 3.98 MG/DL — HIGH (ref 0.5–1.3)
CREAT SERPL-MCNC: 4 MG/DL — HIGH (ref 0.5–1.3)
CREAT SERPL-MCNC: 4 MG/DL — HIGH (ref 0.5–1.3)
EGFR: 14 ML/MIN/1.73M2 — LOW
GLUCOSE SERPL-MCNC: 101 MG/DL — HIGH (ref 70–99)
GLUCOSE SERPL-MCNC: 101 MG/DL — HIGH (ref 70–99)
GLUCOSE SERPL-MCNC: 140 MG/DL — HIGH (ref 70–99)
GLUCOSE SERPL-MCNC: 140 MG/DL — HIGH (ref 70–99)
GRAM STN FLD: ABNORMAL
GRAM STN FLD: ABNORMAL
HCT VFR BLD CALC: 26.1 % — LOW (ref 39–50)
HCT VFR BLD CALC: 26.1 % — LOW (ref 39–50)
HGB BLD-MCNC: 8.9 G/DL — LOW (ref 13–17)
HGB BLD-MCNC: 8.9 G/DL — LOW (ref 13–17)
MAGNESIUM SERPL-MCNC: 2.2 MG/DL — SIGNIFICANT CHANGE UP (ref 1.6–2.6)
MAGNESIUM SERPL-MCNC: 2.2 MG/DL — SIGNIFICANT CHANGE UP (ref 1.6–2.6)
MCHC RBC-ENTMCNC: 31.4 PG — SIGNIFICANT CHANGE UP (ref 27–34)
MCHC RBC-ENTMCNC: 31.4 PG — SIGNIFICANT CHANGE UP (ref 27–34)
MCHC RBC-ENTMCNC: 34.1 GM/DL — SIGNIFICANT CHANGE UP (ref 32–36)
MCHC RBC-ENTMCNC: 34.1 GM/DL — SIGNIFICANT CHANGE UP (ref 32–36)
MCV RBC AUTO: 92.2 FL — SIGNIFICANT CHANGE UP (ref 80–100)
MCV RBC AUTO: 92.2 FL — SIGNIFICANT CHANGE UP (ref 80–100)
PHOSPHATE SERPL-MCNC: 4.8 MG/DL — HIGH (ref 2.4–4.7)
PHOSPHATE SERPL-MCNC: 4.8 MG/DL — HIGH (ref 2.4–4.7)
PLATELET # BLD AUTO: 86 K/UL — LOW (ref 150–400)
PLATELET # BLD AUTO: 86 K/UL — LOW (ref 150–400)
POTASSIUM SERPL-MCNC: 4.8 MMOL/L — SIGNIFICANT CHANGE UP (ref 3.5–5.3)
POTASSIUM SERPL-SCNC: 4.8 MMOL/L — SIGNIFICANT CHANGE UP (ref 3.5–5.3)
PROT SERPL-MCNC: 5.6 G/DL — LOW (ref 6.6–8.7)
PROT SERPL-MCNC: 5.6 G/DL — LOW (ref 6.6–8.7)
RBC # BLD: 2.83 M/UL — LOW (ref 4.2–5.8)
RBC # BLD: 2.83 M/UL — LOW (ref 4.2–5.8)
RBC # FLD: 12.8 % — SIGNIFICANT CHANGE UP (ref 10.3–14.5)
RBC # FLD: 12.8 % — SIGNIFICANT CHANGE UP (ref 10.3–14.5)
SODIUM SERPL-SCNC: 133 MMOL/L — LOW (ref 135–145)
SODIUM SERPL-SCNC: 133 MMOL/L — LOW (ref 135–145)
SODIUM SERPL-SCNC: 135 MMOL/L — SIGNIFICANT CHANGE UP (ref 135–145)
SODIUM SERPL-SCNC: 135 MMOL/L — SIGNIFICANT CHANGE UP (ref 135–145)
SPECIMEN SOURCE: SIGNIFICANT CHANGE UP
SPECIMEN SOURCE: SIGNIFICANT CHANGE UP
UUN UR-MCNC: 228 MG/DL — SIGNIFICANT CHANGE UP
UUN UR-MCNC: 228 MG/DL — SIGNIFICANT CHANGE UP
WBC # BLD: 16.92 K/UL — HIGH (ref 3.8–10.5)
WBC # BLD: 16.92 K/UL — HIGH (ref 3.8–10.5)
WBC # FLD AUTO: 16.92 K/UL — HIGH (ref 3.8–10.5)
WBC # FLD AUTO: 16.92 K/UL — HIGH (ref 3.8–10.5)

## 2023-11-12 PROCEDURE — 76536 US EXAM OF HEAD AND NECK: CPT | Mod: 26

## 2023-11-12 PROCEDURE — 99222 1ST HOSP IP/OBS MODERATE 55: CPT

## 2023-11-12 PROCEDURE — 71045 X-RAY EXAM CHEST 1 VIEW: CPT | Mod: 26

## 2023-11-12 PROCEDURE — 93010 ELECTROCARDIOGRAM REPORT: CPT

## 2023-11-12 PROCEDURE — 99291 CRITICAL CARE FIRST HOUR: CPT

## 2023-11-12 PROCEDURE — 70450 CT HEAD/BRAIN W/O DYE: CPT | Mod: 26

## 2023-11-12 RX ORDER — ALLOPURINOL 300 MG
300 TABLET ORAL DAILY
Refills: 0 | Status: DISCONTINUED | OUTPATIENT
Start: 2023-11-12 | End: 2023-11-12

## 2023-11-12 RX ORDER — ATORVASTATIN CALCIUM 80 MG/1
20 TABLET, FILM COATED ORAL AT BEDTIME
Refills: 0 | Status: DISCONTINUED | OUTPATIENT
Start: 2023-11-12 | End: 2023-11-12

## 2023-11-12 RX ORDER — FLUPHENAZINE HYDROCHLORIDE 1 MG/1
0.5 TABLET, FILM COATED ORAL
Refills: 0 | Status: DISCONTINUED | OUTPATIENT
Start: 2023-11-12 | End: 2023-11-12

## 2023-11-12 RX ORDER — METOPROLOL TARTRATE 50 MG
5 TABLET ORAL ONCE
Refills: 0 | Status: COMPLETED | OUTPATIENT
Start: 2023-11-12 | End: 2023-11-13

## 2023-11-12 RX ORDER — DILTIAZEM HCL 120 MG
20 CAPSULE, EXT RELEASE 24 HR ORAL ONCE
Refills: 0 | Status: ACTIVE | OUTPATIENT
Start: 2023-11-12

## 2023-11-12 RX ORDER — DEXMEDETOMIDINE HYDROCHLORIDE IN 0.9% SODIUM CHLORIDE 4 UG/ML
0.2 INJECTION INTRAVENOUS
Qty: 200 | Refills: 0 | Status: DISCONTINUED | OUTPATIENT
Start: 2023-11-12 | End: 2023-11-14

## 2023-11-12 RX ORDER — ONDANSETRON 8 MG/1
4 TABLET, FILM COATED ORAL ONCE
Refills: 0 | Status: COMPLETED | OUTPATIENT
Start: 2023-11-12 | End: 2023-11-12

## 2023-11-12 RX ORDER — DILTIAZEM HCL 120 MG
15 CAPSULE, EXT RELEASE 24 HR ORAL ONCE
Refills: 0 | Status: COMPLETED | OUTPATIENT
Start: 2023-11-12 | End: 2023-11-13

## 2023-11-12 RX ORDER — DILTIAZEM HCL 120 MG
30 CAPSULE, EXT RELEASE 24 HR ORAL EVERY 6 HOURS
Refills: 0 | Status: DISCONTINUED | OUTPATIENT
Start: 2023-11-12 | End: 2023-11-15

## 2023-11-12 RX ORDER — SODIUM CHLORIDE 5 G/100ML
1000 INJECTION, SOLUTION INTRAVENOUS
Refills: 0 | Status: DISCONTINUED | OUTPATIENT
Start: 2023-11-12 | End: 2023-11-14

## 2023-11-12 RX ORDER — TAMSULOSIN HYDROCHLORIDE 0.4 MG/1
0.4 CAPSULE ORAL AT BEDTIME
Refills: 0 | Status: DISCONTINUED | OUTPATIENT
Start: 2023-11-12 | End: 2023-11-12

## 2023-11-12 RX ORDER — FLUPHENAZINE HYDROCHLORIDE 1 MG/1
1 TABLET, FILM COATED ORAL
Refills: 0 | Status: DISCONTINUED | OUTPATIENT
Start: 2023-11-12 | End: 2023-11-12

## 2023-11-12 RX ADMIN — DEXMEDETOMIDINE HYDROCHLORIDE IN 0.9% SODIUM CHLORIDE 4 MICROGRAM(S)/KG/HR: 4 INJECTION INTRAVENOUS at 02:31

## 2023-11-12 RX ADMIN — OXYCODONE HYDROCHLORIDE 10 MILLIGRAM(S): 5 TABLET ORAL at 01:32

## 2023-11-12 RX ADMIN — Medication 650 MILLIGRAM(S): at 05:24

## 2023-11-12 RX ADMIN — OXYCODONE HYDROCHLORIDE 10 MILLIGRAM(S): 5 TABLET ORAL at 02:32

## 2023-11-12 RX ADMIN — CHLORHEXIDINE GLUCONATE 15 MILLILITER(S): 213 SOLUTION TOPICAL at 05:24

## 2023-11-12 RX ADMIN — Medication 1000 MILLIGRAM(S): at 00:22

## 2023-11-12 RX ADMIN — LEVETIRACETAM 400 MILLIGRAM(S): 250 TABLET, FILM COATED ORAL at 18:36

## 2023-11-12 RX ADMIN — CHLORHEXIDINE GLUCONATE 15 MILLILITER(S): 213 SOLUTION TOPICAL at 18:36

## 2023-11-12 RX ADMIN — Medication 650 MILLIGRAM(S): at 22:01

## 2023-11-12 RX ADMIN — CHLORHEXIDINE GLUCONATE 1 APPLICATION(S): 213 SOLUTION TOPICAL at 05:24

## 2023-11-12 RX ADMIN — Medication 650 MILLIGRAM(S): at 16:04

## 2023-11-12 RX ADMIN — ONDANSETRON 4 MILLIGRAM(S): 8 TABLET, FILM COATED ORAL at 03:54

## 2023-11-12 RX ADMIN — Medication 30 MILLIGRAM(S): at 23:47

## 2023-11-12 RX ADMIN — LEVETIRACETAM 400 MILLIGRAM(S): 250 TABLET, FILM COATED ORAL at 05:10

## 2023-11-12 NOTE — PROGRESS NOTE ADULT - SUBJECTIVE AND OBJECTIVE BOX
Patient is a 82y old  Male who presents with a chief complaint of s/p unwitnessed fall with head strike (11 Nov 2023 00:43)       HPI:  82y M with PMH HTN, CAD s/p stents, renal cell carcinoma status post left nephrectomy, bladder CA, BPH, CHF, LBBB, vertigo,  HLD, 5.5cm AAA without rupture post EVAR, paroxysmal A-fib on Eliquis, Plavix, and aspirin, early stage Alzheimer dementia transferred from [facility] s/p unwitnessed fall with head strike at home found by wife on floor at 8am. Patient brought to urgent care by wife and had 5 staples to posterior scalp but was instructed to go to nearest ED.  CT head at [place] showed R frontal IPH, SDH, SAH at 9:00. CTA stroke protocol not performed at [facility]. Patient BIB on 6L NC.  Pt GCS 15 on arrival, A&Ox2 on arrival. After initial assessment, patient noted to be vomiting enroute to CT scanner.     Renal consulted for DONNIE. S/p contrast exposure          (10 Nov 2023 11:04)       PAST MEDICAL & SURGICAL HISTORY:  Hypertension      History of abdominal aortic aneurysm (AAA)      CAD (coronary artery disease)      Alzheimers disease           FAMILY HISTORY:  NC    Social History:Non smoker    MEDICATIONS  (STANDING):  chlorhexidine 0.12% Liquid 15 milliLiter(s) Oral Mucosa every 12 hours  chlorhexidine 2% Cloths 1 Application(s) Topical <User Schedule>  diphtheria/tetanus/pertussis (acellular) Vaccine (Adacel) 0.5 milliLiter(s) IntraMuscular once  levETIRAcetam  IVPB 500 milliGRAM(s) IV Intermittent every 12 hours  sodium chloride 0.9%. 1000 milliLiter(s) (30 mL/Hr) IV Continuous <Continuous>    MEDICATIONS  (PRN):  hydrALAZINE Injectable 10 milliGRAM(s) IV Push every 2 hours PRN SBP>160  labetalol Injectable 10 milliGRAM(s) IV Push every 2 hours PRN SBP>160   Meds reviewed    Allergies    Allergy Status Unknown    Intolerances         REVIEW OF SYSTEMS: NA      ICU Vital Signs Last 24 Hrs  T(C): 37.2 (12 Nov 2023 11:00), Max: 38.8 (11 Nov 2023 17:00)  T(F): 99 (12 Nov 2023 11:00), Max: 101.8 (11 Nov 2023 17:00)  HR: 66 (12 Nov 2023 11:00) (61 - 132)  BP: 122/59 (12 Nov 2023 11:00) (88/51 - 172/63)  BP(mean): 78 (12 Nov 2023 11:00) (64 - 97)  ABP: --  ABP(mean): --  RR: 12 (12 Nov 2023 11:00) (12 - 19)  SpO2: 100% (12 Nov 2023 11:00) (99% - 100%)    O2 Parameters below as of 12 Nov 2023 08:00  Patient On (Oxygen Delivery Method): ventilator    O2 Concentration (%): 40        PHYSICAL EXAM:    GENERAL: +ETT  HEAD:  Right hemicraniectomy site soft, dressing clean, dry and intact   DRAINS: In place and draining well   WOUND: Dressing clean dry intact  MENTAL STATUS: Lethargic but opens his eyes to voice, pupils 3mmR, intubated, following commands  CRANIAL NERVES: PERRLA  MOTOR: Right upper and lower extremity antigravity, following commands on the right upper extremity, withdraws the left upper and lower extremity to pain  CHEST/LUNG: Intubated  SKIN: Warm, dry; no rashes or lesions    LABS:                        8.9    16.92 )-----------( 86       ( 12 Nov 2023 03:00 )             26.1     11-12    133<L>  |  101  |  50.0<H>  ----------------------------<  140<H>  4.8   |  18.0<L>  |  3.98<H>    Ca    8.0<L>      12 Nov 2023 03:00  Phos  4.8     11-12  Mg     2.2     11-12    TPro  5.6<L>  /  Alb  3.0<L>  /  TBili  0.8  /  DBili  x   /  AST  42<H>  /  ALT  21  /  AlkPhos  69  11-12      Urinalysis Basic - ( 12 Nov 2023 03:00 )    Color: x / Appearance: x / SG: x / pH: x  Gluc: 140 mg/dL / Ketone: x  / Bili: x / Urobili: x   Blood: x / Protein: x / Nitrite: x   Leuk Esterase: x / RBC: x / WBC x   Sq Epi: x / Non Sq Epi: x / Bacteria: x        ABG - ( 10 Nov 2023 12:26 )  pH, Arterial: 7.390 pH, Blood: x     /  pCO2: 42    /  pO2: 386   / HCO3: 25    / Base Excess: 0.4   /  SaO2: 100.0

## 2023-11-12 NOTE — DIETITIAN INITIAL EVALUATION ADULT - ORAL INTAKE PTA/DIET HISTORY
Pt intubated at visit, spoke with wife at bedside. Report pt has had nothing to eat in 2 days, but was eating well prior to that-  lbs. TF ordered, not infusing- aware of possible extubation.

## 2023-11-12 NOTE — PROGRESS NOTE ADULT - ASSESSMENT
Chief complaint:   Patient is a 82y old  Male who presents with a chief complaint of s/p unwitnessed fall with head strike (11 Nov 2023 11:05)      This Patient is critically Ill due to the following diagnoses:  ICH sp Craniectomy  Respiratory Failure    NEURO  #Traumatic ICH  ISO DAPT with AC  - Neurochecks  - SBP <160  - HOLD AC/APA  - Ppx Keppra for 7 days    CV  SBP Goal: <160  CAD: resume ASA when NSGY safe  TTE: Possible vegetation?  - Apprecaite Cards for possible WHIT  - Daily BCx until 48h clearance      PULM  #Vent-Dependent Respiratory Failure  - Previous episodes of apnea 2/2 overventilation on AC  - Continue CPAP as tolerated  - SpO2 > 92%  - Barriers to extubation: Worsening neurologic exam  - NPO midnight for possible extubation    GI  TF via OGT  Bowel Regemin    ID  #Fevers  Possibly Central  Tmax 11/11 101.8 with rising WBC  UA (11/11) - clear  CXR - No consolidations  BCx PENDING  - No abx at this time, will consider empiric abx if repeat Tmax >101.4 or hemodynamic instability v starting Bromocriptine    RENAL  #DONNIE  Previous nephrectomy, likely 2/2 ROXANNE +/- intraoperative stress  - Monitor, avoid nephrotoxic meds  - Appreciate Renal    #Hyponatremia  SIADH v Salt wasting  - Trial of 2%, if continues to drop will fluid restrict    ENDO  Glucose <180 with LSS    HEME  Plt >100  VTE Ppx: SCD, SQL    DISPO: ICU    My full attention was spent providing medically necessary critical care to the patient with details documented in my note above.   Critical care time spent examining patient, reviewing vitals, labs, medications, imaging and discussing with the team goals of care   The combined critical care time provided to the patient was between 60 minutes  This time does not include bedside procedures that are documented separately.

## 2023-11-12 NOTE — PROGRESS NOTE ADULT - SUBJECTIVE AND OBJECTIVE BOX
Chief complaint:   Patient is a 82y old  Male who presents with a chief complaint of s/p unwitnessed fall with head strike (11 Nov 2023 11:05)    HPI:  82y M with PMH HTN, CAD s/p stents, renal cell carcinoma status post left nephrectomy, bladder CA, BPH, CHF, LBBB, vertigo,  HLD, 5.5cm AAA without rupture post EVAR, paroxysmal A-fib on Eliquis, Plavix, and aspirin, early stage Alzheimer dementia transferred from [facility] s/p unwitnessed fall with head strike at home found by wife on floor at 8am. Patient brought to urgent care by wife and had 5 staples to posterior scalp but was instructed to go to nearest ED.  CT head at [place] showed R frontal IPH, SDH, SAH at 9:00. CTA stroke protocol not performed at [facility]. Patient BIB on 6L NC.  Pt GCS 15 on arrival, A&Ox2 on arrival. After initial assessment, patient noted to be vomiting enroute to CT scanner.     24hr EVENTS:  11/10 - OR for decompression.  11/11 - Tolerating CPAP  11/12 - CTH with slight worse edema and subgaleal blood collection on personal read. worse exam as below    ROS: [ ]  Unable to assess due to mental status   All other systems negative    -----------------------------------------------------------------------------------------------------------------------------------------------------------------------------------    PHYSICAL EXAM:  General: intubated  HEENT: MMM  Neuro:  -Mental status- No acute distress  -CN- PERRL 3mm, EOMI, tongue midline, L facial  - R slight withdrawal, no longer localizing    CV: RRR  Pulm: clear to auscultation  Abd: Soft, nontender, nondistended  Ext: no noted edema in lower ext  Skin: warm, dry      ------------------------------------------------------------------------------------------------------  ICU Vital Signs Last 24 Hrs  T(C): 37.5 (12 Nov 2023 13:00), Max: 38.8 (11 Nov 2023 17:00)  T(F): 99.5 (12 Nov 2023 13:00), Max: 101.8 (11 Nov 2023 17:00)  HR: 67 (12 Nov 2023 13:00) (61 - 132)  BP: 120/64 (12 Nov 2023 13:00) (88/51 - 166/62)  BP(mean): 82 (12 Nov 2023 13:00) (64 - 97)  ABP: --  ABP(mean): --  RR: 15 (12 Nov 2023 13:00) (12 - 19)  SpO2: 100% (12 Nov 2023 13:00) (99% - 100%)    O2 Parameters below as of 12 Nov 2023 12:00  Patient On (Oxygen Delivery Method): ventilator    O2 Concentration (%): 40        I&O's Summary    11 Nov 2023 07:01  -  12 Nov 2023 07:00  --------------------------------------------------------  IN: 1932 mL / OUT: 590 mL / NET: 1342 mL    12 Nov 2023 07:01  -  12 Nov 2023 14:36  --------------------------------------------------------  IN: 180 mL / OUT: 100 mL / NET: 80 mL        MEDICATIONS  (STANDING):  chlorhexidine 0.12% Liquid 15 milliLiter(s) Oral Mucosa every 12 hours  chlorhexidine 2% Cloths 1 Application(s) Topical <User Schedule>  dexMEDEtomidine Infusion 0.2 MICROgram(s)/kG/Hr (4 mL/Hr) IV Continuous <Continuous>  diphtheria/tetanus/pertussis (acellular) Vaccine (Adacel) 0.5 milliLiter(s) IntraMuscular once  levETIRAcetam  IVPB 500 milliGRAM(s) IV Intermittent every 12 hours  polyethylene glycol 3350 17 Gram(s) Oral daily  senna 2 Tablet(s) Oral at bedtime  sodium bicarbonate 650 milliGRAM(s) Oral three times a day  sodium chloride 2% . 1000 milliLiter(s) (60 mL/Hr) IV Continuous <Continuous>      RESPIRATORY:  Mode: CPAP with PS  FiO2: 30  PEEP: 6  PS: 8  MAP: 9  PIP: 19      IMAGING:   Recent imaging studies were reviewed.    LAB RESULTS:                          8.9    16.92 )-----------( 86       ( 12 Nov 2023 03:00 )             26.1           11-12    133<L>  |  101  |  50.0<H>  ----------------------------<  140<H>  4.8   |  18.0<L>  |  3.98<H>    Ca    8.0<L>      12 Nov 2023 03:00  Phos  4.8     11-12  Mg     2.2     11-12    TPro  5.6<L>  /  Alb  3.0<L>  /  TBili  0.8  /  DBili  x   /  AST  42<H>  /  ALT  21  /  AlkPhos  69  11-12                Culture - Sputum (collected 11-11-23 @ 16:11)  Source: ET Tube ET Tube  Gram Stain (11-12-23 @ 11:50):    Numerous polymorphonuclear leukocytes per low power field    Few Squamous epithelial cells per low power field    Few Yeast like cells per oil power field    Numerous Gram Negative Rods per oil power field

## 2023-11-12 NOTE — DIETITIAN INITIAL EVALUATION ADULT - NUTRITION DIAGNOSITC TERMINOLOGY #1
2 month EGD recall schedule for 02/18/2020 at 9:15am     Need case request.     Thank you   
Done and mailed instructions to patient . Thank you   
Request entered  
Inadequate Protein Energy Intake

## 2023-11-12 NOTE — DIETITIAN INITIAL EVALUATION ADULT - NSFNSGIIOFT_GEN_A_CORE
11-11-23 @ 07:01  -  11-12-23 @ 07:00  --------------------------------------------------------  OUT:  Total OUT: 0 mL    Total NET: 170 mL

## 2023-11-12 NOTE — DIETITIAN INITIAL EVALUATION ADULT - PERTINENT LABORATORY DATA
11-12    133<L>  |  101  |  50.0<H>  ----------------------------<  140<H>  4.8   |  18.0<L>  |  3.98<H>    Ca    8.0<L>      12 Nov 2023 03:00  Phos  4.8     11-12  Mg     2.2     11-12    TPro  5.6<L>  /  Alb  3.0<L>  /  TBili  0.8  /  DBili  x   /  AST  42<H>  /  ALT  21  /  AlkPhos  69  11-12

## 2023-11-12 NOTE — PROGRESS NOTE ADULT - ASSESSMENT
83 yo male POD#1 s/p a right decompressive hemicraniectomy by Dr. Larios    Plan:  -Pain Control, avoid over sedation  -Official report of CTH pending  -Goal SBP < 160  -Continue to monitor and record drain output q4 hours  -Keppra for seizure ppx  -Keep HOB < 30 degrees   -Wean to extubate when able  -US thyroid and parathyroid   -DVT ppx: SCD's   83 yo male POD#1 s/p a right decompressive hemicraniectomy by Dr. Larios    Plan:  -Pain Control, avoid over sedation  -Official report of CTH pending  -Goal SBP < 160  -Continue to monitor and record drain output q4 hours  -Keppra for seizure ppx  -Keep HOB < 30 degrees   -Wean to extubate when able  -US thyroid and parathyroid   - NS at 60 mL/hr   - Appreciate nephrology's recommendations   -DVT ppx: SCD's

## 2023-11-12 NOTE — DIETITIAN INITIAL EVALUATION ADULT - WEIGHT FOR BMI (LBS)
Discussed POC, wound care rationale, dressing selection and to return to [facility] three times weekly for appts. Pt instructed to keep dressing CDI and to return to ER for any s/s infection, n/v, fever or chills. Pt verbalized understanding to all.     155

## 2023-11-12 NOTE — CONSULT NOTE ADULT - ASSESSMENT
82y M with PMH HTN, CAD s/p stents, renal cell carcinoma status post left nephrectomy, bladder CA, BPH, CHF, LBBB, vertigo,  HLD, 5.5cm AAA without rupture post EVAR, paroxysmal A-fib on Eliquis, Plavix, and aspirin, early stage Alzheimer dementia transferred from [facility] s/p unwitnessed fall with head strike at home found by wife on floor at 8am. Patient brought to urgent care by wife and had 5 staples to posterior scalp but was instructed to go to nearest ED.  CT head at [place] showed R frontal IPH, SDH, SAH at 9:00. CTA stroke protocol not performed at [facility]. Patient BIB on 6L NC.  Pt GCS 15 on arrival, A&Ox2 on arrival. After initial assessment, patient noted to be vomiting enroute to CT scanner. S/p R decompressive hemicraniotomy by Dr. Larios.

## 2023-11-12 NOTE — DIETITIAN INITIAL EVALUATION ADULT - NS FNS DIET ORDER
Diet, NPO after Midnight:      NPO Start Date: 12-Nov-2023,   NPO Start Time: 23:59 (11-12-23 @ 10:43)  Diet, NPO with Tube Feed:   Tube Feeding Modality: Orogastric  Jevity 1.5 Ben (JEVITY1.5)  Total Volume for 24 Hours (mL): 1320  Continuous  Starting Tube Feed Rate {mL per Hour}: 20  Increase Tube Feed Rate by (mL): 10     Every 4 hours  Until Goal Tube Feed Rate (mL per Hour): 55  Tube Feed Duration (in Hours): 24  Tube Feed Start Time: 05:30 (11-11-23 @ 15:19)

## 2023-11-12 NOTE — PROGRESS NOTE ADULT - ASSESSMENT
DONNIE  Acquired solitary kidney with RCC s/p nephrectomy   Hyperkalemia  Metabolic acidosis  IPH s/p a right decompressive hemicraniectomy     -DONNIE with nephrotoxic ATN due to ROXANNE  -ROXANNE will have run its course. In the mean time, avoid further nephrotoxins. Keep MAP>65  -Urine studies. FeNa low, consistent with ROXANNE  -IVF  -Patient had h/o DONNIE with contrast, requiring brief dialysis  -No indication for urgent dialysis at this time, but approaching closely. Monitor closely   -S/p Lokelma 10 grams po x 1  -PO bicarb   -2% saline as needed per neurosurgery     D/w family

## 2023-11-12 NOTE — CONSULT NOTE ADULT - NS ATTEND AMEND GEN_ALL_CORE FT
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 Jose Anderson in [place] last seen 4/2023, presents with unwitnessed fall with traumatic SDH/SAH POD#2 from R-decompressive hemicraniectomy remains intubated, noted febrile episodes yesterday with leukocytosis, blood cultures sent, TTE done with reports AV lesion   -reviewed TTE images cannot confirm vegetation, possible calcifications from aV stenosis, pending blood cultures result if bacteremic would obtain WHIT to confirm vegetation as would change duration of antibiotics course, he is not candidate for open heart surgery for valve replacement given recent brain surgery  -current antiplatelets on hold, resume ASA 81mg for CAD/stents when stable from neurosurgery standpoint   -not candidate for long term AC given traumatic SDH, can consider elective outpatient LAAO eval.  -worsening DONNIE, nephrology following        Fabiano Gibson DO, [facility]  Faculty Non-Invasive Cardiologist  [phone]

## 2023-11-12 NOTE — DIETITIAN INITIAL EVALUATION ADULT - ETIOLOGY
related to ICH [Alert] : alert [No Acute Distress] : no acute distress [Normocephalic] : normocephalic [Conjunctivae with no discharge] : conjunctivae with no discharge [PERRL] : PERRL [EOMI Bilateral] : EOMI bilateral [Auricles Well Formed] : auricles well formed [Clear Tympanic membranes with present light reflex and bony landmarks] : clear tympanic membranes with present light reflex and bony landmarks [No Discharge] : no discharge [Nares Patent] : nares patent [Pink Nasal Mucosa] : pink nasal mucosa [Palate Intact] : palate intact [Nonerythematous Oropharynx] : nonerythematous oropharynx [Supple, full passive range of motion] : supple, full passive range of motion [No Palpable Masses] : no palpable masses [Symmetric Chest Rise] : symmetric chest rise [Clear to Auscultation Bilaterally] : clear to auscultation bilaterally [Regular Rate and Rhythm] : regular rate and rhythm [Normal S1, S2 present] : normal S1, S2 present [No Murmurs] : no murmurs [+2 Femoral Pulses] : +2 femoral pulses [Soft] : soft [NonTender] : non tender [Non Distended] : non distended [Normoactive Bowel Sounds] : normoactive bowel sounds [No Hepatomegaly] : no hepatomegaly [No Splenomegaly] : no splenomegaly [Ron: _____] : Ron [unfilled] [Testicles Descended Bilaterally] : testicles descended bilaterally [No Abnormal Lymph Nodes Palpated] : no abnormal lymph nodes palpated [No Gait Asymmetry] : no gait asymmetry [No pain or deformities with palpation of bone, muscles, joints] : no pain or deformities with palpation of bone, muscles, joints [Normal Muscle Tone] : normal muscle tone [Straight] : straight [+2 Patella DTR] : +2 patella DTR [Cranial Nerves Grossly Intact] : cranial nerves grossly intact [No Rash or Lesions] : no rash or lesions [FreeTextEntry4] : mild irritation medial nasal turbinates [FreeTextEntry6] : normal external genitalia

## 2023-11-12 NOTE — DIETITIAN INITIAL EVALUATION ADULT - PERTINENT MEDS FT
MEDICATIONS  (STANDING):  chlorhexidine 0.12% Liquid 15 milliLiter(s) Oral Mucosa every 12 hours  chlorhexidine 2% Cloths 1 Application(s) Topical <User Schedule>  dexMEDEtomidine Infusion 0.2 MICROgram(s)/kG/Hr (4 mL/Hr) IV Continuous <Continuous>  diphtheria/tetanus/pertussis (acellular) Vaccine (Adacel) 0.5 milliLiter(s) IntraMuscular once  levETIRAcetam  IVPB 500 milliGRAM(s) IV Intermittent every 12 hours  polyethylene glycol 3350 17 Gram(s) Oral daily  senna 2 Tablet(s) Oral at bedtime  sodium bicarbonate 650 milliGRAM(s) Oral three times a day  sodium chloride 2% . 1000 milliLiter(s) (60 mL/Hr) IV Continuous <Continuous>    MEDICATIONS  (PRN):  hydrALAZINE Injectable 10 milliGRAM(s) IV Push every 2 hours PRN SBP>160  labetalol Injectable 10 milliGRAM(s) IV Push every 2 hours PRN SBP>160  oxyCODONE    IR 5 milliGRAM(s) Oral every 4 hours PRN Moderate Pain (4 - 6)  oxyCODONE    IR 10 milliGRAM(s) Oral every 4 hours PRN Severe Pain (7 - 10)

## 2023-11-12 NOTE — PROGRESS NOTE ADULT - SUBJECTIVE AND OBJECTIVE BOX
HPI:  82y M with PMH HTN, CAD s/p stents, renal cell carcinoma status post left nephrectomy, bladder CA, BPH, CHF, LBBB, vertigo,  HLD, 5.5cm AAA without rupture post EVAR, paroxysmal A-fib on Eliquis, Plavix, and aspirin, early stage Alzheimer dementia transferred from [facility] s/p unwitnessed fall with head strike at home found by wife on floor at 8am. Patient brought to urgent care by wife and had 5 staples to posterior scalp but was instructed to go to nearest ED.  CT head at [place] showed R frontal IPH, SDH, SAH at 9:00. CTA stroke protocol not performed at [facility]. Patient BIB on 6L NC.  Pt GCS 15 on arrival, A&Ox2 on arrival. After initial assessment, patient noted to be vomiting enroute to CT scanner (10 Nov 2023 11:04).    INTERVAL HPI/OVERNIGHT EVENTS:    82y Male seen lying comfortably in bed, remains intubated. Roshan persaud drain discontinued. Pt having fevers, pan cultured.     Vital Signs Last 24 Hrs  T(C): 38.4 (11 Nov 2023 19:00), Max: 38.8 (11 Nov 2023 17:00)  T(F): 101.1 (11 Nov 2023 19:00), Max: 101.8 (11 Nov 2023 17:00)  HR: 113 (11 Nov 2023 19:51) (55 - 132)  BP: 121/49 (11 Nov 2023 19:00) (112/79 - 172/63)  BP(mean): 70 (11 Nov 2023 19:00) (70 - 106)  RR: 18 (11 Nov 2023 19:00) (16 - 20)  SpO2: 100% (11 Nov 2023 19:51) (100% - 100%)    Parameters below as of 11 Nov 2023 16:00  Patient On (Oxygen Delivery Method): ventilator    O2 Concentration (%): 40    PHYSICAL EXAM:  GENERAL: No acute distress, well-developed  HEAD:  Atraumatic, Normocephalic  EYES: EOMI, PERRLA, conjunctiva and sclera clear  NECK: Supple, no lymphadenopathy, no JVD  CHEST/LUNG: CTAB; No wheezes, rales, or rhonchi  HEART: Regular rate and rhythm. Normal S1/S2. No murmurs, rubs, or gallops  ABDOMEN: Soft, non-tender, non-distended; normal bowel sounds, no organomegaly  EXTREMITIES:  2+ peripheral pulses b/l, No clubbing, cyanosis, or edema  NEUROLOGY: A&O x 3, no focal deficits  SKIN: No rashes or lesions    LABS:                        11.0   13.51 )-----------( 102      ( 11 Nov 2023 03:25 )             30.9     11-11    134<L>  |  100  |  27.9<H>  ----------------------------<  166<H>  5.5<H>   |  18.0<L>  |  2.61<H>    Ca    8.6      11 Nov 2023 03:25  Phos  3.1     11-11  Mg     2.1     11-11    TPro  5.9<L>  /  Alb  3.5  /  TBili  1.0  /  DBili  x   /  AST  16  /  ALT  12  /  AlkPhos  89  11-10    PT/INR - ( 10 Nov 2023 11:00 )   PT: 11.5 sec;   INR: 1.04 ratio      PTT - ( 10 Nov 2023 11:00 )  PTT:30.8 sec  Urinalysis Basic - ( 11 Nov 2023 16:10 )    Color: Yellow / Appearance: Clear / SG: >1.030 / pH: x  Gluc: x / Ketone: Trace mg/dL  / Bili: Negative / Urobili: 1.0 mg/dL   Blood: x / Protein: 100 mg/dL / Nitrite: Negative   Leuk Esterase: Negative / RBC: 7 /HPF / WBC 5 /HPF   Sq Epi: x / Non Sq Epi: 2 /HPF / Bacteria: Negative /HPF    11-10 @ 07:01 - 11-11 @ 07:00  --------------------------------------------------------  IN: 710 mL / OUT: 1155 mL / NET: -445 mL    11-11 @ 07:01 - 11-12 @ 00:21  --------------------------------------------------------  IN: 860 mL / OUT: 205 mL / NET: 655 mL         HPI:  82y M with PMH HTN, CAD s/p stents, renal cell carcinoma status post left nephrectomy, bladder CA, BPH, CHF, LBBB, vertigo,  HLD, 5.5cm AAA without rupture post EVAR, paroxysmal A-fib on Eliquis, Plavix, and aspirin, early stage Alzheimer dementia transferred from [facility] s/p unwitnessed fall with head strike at home found by wife on floor at 8am. Patient brought to urgent care by wife and had 5 staples to posterior scalp but was instructed to go to nearest ED.  CT head at [place] showed R frontal IPH, SDH, SAH at 9:00. CTA stroke protocol not performed at [facility]. Patient BIB on 6L NC.  Pt GCS 15 on arrival, A&Ox2 on arrival. After initial assessment, patient noted to be vomiting enroute to CT scanner (10 Nov 2023 11:04).    INTERVAL HPI/OVERNIGHT EVENTS:    82y Male seen lying comfortably in bed, remains intubated. Roshan persaud drain discontinued. Pt having fevers, pan cultured.     Vital Signs Last 24 Hrs  T(C): 38.4 (11 Nov 2023 19:00), Max: 38.8 (11 Nov 2023 17:00)  T(F): 101.1 (11 Nov 2023 19:00), Max: 101.8 (11 Nov 2023 17:00)  HR: 113 (11 Nov 2023 19:51) (55 - 132)  BP: 121/49 (11 Nov 2023 19:00) (112/79 - 172/63)  BP(mean): 70 (11 Nov 2023 19:00) (70 - 106)  RR: 18 (11 Nov 2023 19:00) (16 - 20)  SpO2: 100% (11 Nov 2023 19:51) (100% - 100%)    Parameters below as of 11 Nov 2023 16:00  Patient On (Oxygen Delivery Method): ventilator    O2 Concentration (%): 40    PHYSICAL EXAM:  GENERAL: No acute distress, well-developed  HEAD:  Atraumatic, Normocephalic  EYES: EOMI, PERRLA, conjunctiva and sclera clear  NECK: Supple  CHEST/LUNG: Lung sounds clear, diminished at the bases  HEART: Regular rate and rhythm.   ABDOMEN: Soft, non-tender, non-distended  EXTREMITIES:  2+ peripheral pulses b/l, No clubbing, cyanosis, or edema  NEUROLOGY: Intubated   SKIN: No rashes    LABS:                        11.0   13.51 )-----------( 102      ( 11 Nov 2023 03:25 )             30.9     11-11    134<L>  |  100  |  27.9<H>  ----------------------------<  166<H>  5.5<H>   |  18.0<L>  |  2.61<H>    Ca    8.6      11 Nov 2023 03:25  Phos  3.1     11-11  Mg     2.1     11-11    TPro  5.9<L>  /  Alb  3.5  /  TBili  1.0  /  DBili  x   /  AST  16  /  ALT  12  /  AlkPhos  89  11-10    PT/INR - ( 10 Nov 2023 11:00 )   PT: 11.5 sec;   INR: 1.04 ratio      PTT - ( 10 Nov 2023 11:00 )  PTT:30.8 sec  Urinalysis Basic - ( 11 Nov 2023 16:10 )    Color: Yellow / Appearance: Clear / SG: >1.030 / pH: x  Gluc: x / Ketone: Trace mg/dL  / Bili: Negative / Urobili: 1.0 mg/dL   Blood: x / Protein: 100 mg/dL / Nitrite: Negative   Leuk Esterase: Negative / RBC: 7 /HPF / WBC 5 /HPF   Sq Epi: x / Non Sq Epi: 2 /HPF / Bacteria: Negative /HPF    11-10 @ 07:01 - 11-11 @ 07:00  --------------------------------------------------------  IN: 710 mL / OUT: 1155 mL / NET: -445 mL    11-11 @ 07:01 - 11-12 @ 00:21  --------------------------------------------------------  IN: 860 mL / OUT: 205 mL / NET: 655 mL         HPI:  82y M with PMH HTN, CAD s/p stents, renal cell carcinoma status post left nephrectomy, bladder CA, BPH, CHF, LBBB, vertigo,  HLD, 5.5cm AAA without rupture post EVAR, paroxysmal A-fib on Eliquis, Plavix, and aspirin, early stage Alzheimer dementia transferred from [facility] s/p unwitnessed fall with head strike at home found by wife on floor at 8am. Patient brought to urgent care by wife and had 5 staples to posterior scalp but was instructed to go to nearest ED.  CT head at [place] showed R frontal IPH, SDH, SAH at 9:00. CTA stroke protocol not performed at [facility]. Patient BIB on 6L NC.  Pt GCS 15 on arrival, A&Ox2 on arrival. After initial assessment, patient noted to be vomiting enroute to CT scanner (10 Nov 2023 11:04).    DONNIE with nephrotoxic ATN due to ROXANNE    INTERVAL HPI/OVERNIGHT EVENTS:    82y Male seen lying comfortably in bed, remains intubated. Roshan persaud drain discontinued. Pt having fevers, pan cultured. Nephrology consulted for DONNIE due to contrast induced nephropathy.    Vital Signs Last 24 Hrs  T(C): 38.4 (11 Nov 2023 19:00), Max: 38.8 (11 Nov 2023 17:00)  T(F): 101.1 (11 Nov 2023 19:00), Max: 101.8 (11 Nov 2023 17:00)  HR: 113 (11 Nov 2023 19:51) (55 - 132)  BP: 121/49 (11 Nov 2023 19:00) (112/79 - 172/63)  BP(mean): 70 (11 Nov 2023 19:00) (70 - 106)  RR: 18 (11 Nov 2023 19:00) (16 - 20)  SpO2: 100% (11 Nov 2023 19:51) (100% - 100%)    Parameters below as of 11 Nov 2023 16:00  Patient On (Oxygen Delivery Method): ventilator    O2 Concentration (%): 40    PHYSICAL EXAM:  GENERAL: No acute distress, well-developed  HEAD:  Atraumatic, Normocephalic  EYES: EOMI, PERRLA, conjunctiva and sclera clear  NECK: Supple  CHEST/LUNG: Lung sounds clear, diminished at the bases  HEART: Regular rate and rhythm.   ABDOMEN: Soft, non-tender, non-distended  EXTREMITIES:  2+ peripheral pulses b/l, No clubbing, cyanosis, or edema  NEUROLOGY: Intubated   SKIN: No rashes    LABS:                        11.0   13.51 )-----------( 102      ( 11 Nov 2023 03:25 )             30.9     11-11    134<L>  |  100  |  27.9<H>  ----------------------------<  166<H>  5.5<H>   |  18.0<L>  |  2.61<H>    Ca    8.6      11 Nov 2023 03:25  Phos  3.1     11-11  Mg     2.1     11-11    TPro  5.9<L>  /  Alb  3.5  /  TBili  1.0  /  DBili  x   /  AST  16  /  ALT  12  /  AlkPhos  89  11-10    PT/INR - ( 10 Nov 2023 11:00 )   PT: 11.5 sec;   INR: 1.04 ratio      PTT - ( 10 Nov 2023 11:00 )  PTT:30.8 sec  Urinalysis Basic - ( 11 Nov 2023 16:10 )    Color: Yellow / Appearance: Clear / SG: >1.030 / pH: x  Gluc: x / Ketone: Trace mg/dL  / Bili: Negative / Urobili: 1.0 mg/dL   Blood: x / Protein: 100 mg/dL / Nitrite: Negative   Leuk Esterase: Negative / RBC: 7 /HPF / WBC 5 /HPF   Sq Epi: x / Non Sq Epi: 2 /HPF / Bacteria: Negative /HPF    11-10 @ 07:01 - 11-11 @ 07:00  --------------------------------------------------------  IN: 710 mL / OUT: 1155 mL / NET: -445 mL    11-11 @ 07:01 - 11-12 @ 00:21  --------------------------------------------------------  IN: 860 mL / OUT: 205 mL / NET: 655 mL         HPI:  82y M with PMH HTN, CAD s/p stents, renal cell carcinoma status post left nephrectomy, bladder CA, BPH, CHF, LBBB, vertigo,  HLD, 5.5cm AAA without rupture post EVAR, paroxysmal A-fib on Eliquis, Plavix, and aspirin, early stage Alzheimer dementia transferred from [facility] s/p unwitnessed fall with head strike at home found by wife on floor at 8am. Patient brought to urgent care by wife and had 5 staples to posterior scalp but was instructed to go to nearest ED.  CT head at [place] showed R frontal IPH, SDH, SAH at 9:00. CTA stroke protocol not performed at [facility]. Patient BIB on 6L NC.  Pt GCS 15 on arrival, A&Ox2 on arrival. After initial assessment, patient noted to be vomiting enroute to CT scanner (10 Nov 2023 11:04).    DONNIE with nephrotoxic ATN due to ROXANNE    INTERVAL HPI/OVERNIGHT EVENTS:    82y Male seen lying comfortably in bed, remains intubated. Roshan persaud drain discontinued. Pt having fevers, pan cultured. Nephrology consulted for DONNIE due to contrast induced nephropathy.    Vital Signs Last 24 Hrs  T(C): 38.4 (11 Nov 2023 19:00), Max: 38.8 (11 Nov 2023 17:00)  T(F): 101.1 (11 Nov 2023 19:00), Max: 101.8 (11 Nov 2023 17:00)  HR: 113 (11 Nov 2023 19:51) (55 - 132)  BP: 121/49 (11 Nov 2023 19:00) (112/79 - 172/63)  BP(mean): 70 (11 Nov 2023 19:00) (70 - 106)  RR: 18 (11 Nov 2023 19:00) (16 - 20)  SpO2: 100% (11 Nov 2023 19:51) (100% - 100%)    Parameters below as of 11 Nov 2023 16:00  Patient On (Oxygen Delivery Method): ventilator    O2 Concentration (%): 40    PHYSICAL EXAM:  GENERAL: No acute distress, well-developed  HEAD:  Atraumatic, Normocephalic  EYES: EOMI, PERRLA, conjunctiva and sclera clear  NECK: Supple  CHEST/LUNG: Lung sounds clear, diminished at the bases  HEART: Regular rate and rhythm.   ABDOMEN: Soft, non-tender, non-distended  EXTREMITIES:  2+ peripheral pulses b/l, No clubbing, cyanosis, or edema  NEUROLOGY: Intubated, withdraws to pain in all extremities   SKIN: No rashes    LABS:                        11.0   13.51 )-----------( 102      ( 11 Nov 2023 03:25 )             30.9     11-11    134<L>  |  100  |  27.9<H>  ----------------------------<  166<H>  5.5<H>   |  18.0<L>  |  2.61<H>    Ca    8.6      11 Nov 2023 03:25  Phos  3.1     11-11  Mg     2.1     11-11    TPro  5.9<L>  /  Alb  3.5  /  TBili  1.0  /  DBili  x   /  AST  16  /  ALT  12  /  AlkPhos  89  11-10    PT/INR - ( 10 Nov 2023 11:00 )   PT: 11.5 sec;   INR: 1.04 ratio      PTT - ( 10 Nov 2023 11:00 )  PTT:30.8 sec  Urinalysis Basic - ( 11 Nov 2023 16:10 )    Color: Yellow / Appearance: Clear / SG: >1.030 / pH: x  Gluc: x / Ketone: Trace mg/dL  / Bili: Negative / Urobili: 1.0 mg/dL   Blood: x / Protein: 100 mg/dL / Nitrite: Negative   Leuk Esterase: Negative / RBC: 7 /HPF / WBC 5 /HPF   Sq Epi: x / Non Sq Epi: 2 /HPF / Bacteria: Negative /HPF    11-10 @ 07:01 - 11-11 @ 07:00  --------------------------------------------------------  IN: 710 mL / OUT: 1155 mL / NET: -445 mL    11-11 @ 07:01 - 11-12 @ 00:21  --------------------------------------------------------  IN: 860 mL / OUT: 205 mL / NET: 655 mL

## 2023-11-12 NOTE — DIETITIAN INITIAL EVALUATION ADULT - OTHER INFO
Patient is a 82y old  Male who presents with a chief complaint of s/p unwitnessed fall with head strike (11 Nov 2023 11:05)      This Patient is critically Ill due to the following diagnoses:  ICH sp Craniectomy  Respiratory Failure    #Traumatic ICH

## 2023-11-12 NOTE — CONSULT NOTE ADULT - SUBJECTIVE AND OBJECTIVE BOX
[facility]YS[facility]IAN [facility]                                              CARDIOLOGY AT [facility]                                                   [address], [place] [address]                                             Telephone: [phone]. Fax:[phone]                                                       CARDIOLOGY CONSULTATION NOTE                                                                                             History obtained by: Patient and medical record  Community Cardiologist: Dr Anderson   obtained: Yes [  ] No [x ]  Reason for Consultation: r/o endocarditis   Available out pt records reviewed: Yes [ x ] No [  ]    Chief complaint:    Patient is a 82y old  Male who presents with a chief complaint of s/p unwitnessed fall with head strike (12 Nov 2023 11:29)    HPI:  82y M with PMH HTN, CAD s/p stents, renal cell carcinoma status post left nephrectomy, bladder CA, BPH, CHF, LBBB, vertigo,  HLD, 5.5cm AAA without rupture post EVAR, paroxysmal A-fib on Eliquis, Plavix, and aspirin, early stage Alzheimer dementia transferred from [facility] s/p unwitnessed fall with head strike at home found by wife on floor at 8am. Patient brought to urgent care by wife and had 5 staples to posterior scalp but was instructed to go to nearest ED.  CT head at [place] showed R frontal IPH, SDH, SAH at 9:00. CTA stroke protocol not performed at [facility]. Patient BIB on 6L NC.  Pt GCS 15 on arrival, A&Ox2 on arrival. After initial assessment, patient noted to be vomiting enroute to CT scanner. S/p R decompressive hemicraniotomy by Dr. Larios.       CARDIAC TESTING   ECHO: < from: TTE Echo Complete w/o Contrast w/ Doppler (11.11.23 @ 08:58) >  Summary:   1. Endocardial visualization was enhanced with intravenous echo contrast.   2. Normal global left ventricular systolic function.   3. There is mild concentric left ventricular hypertrophy.   4. Left ventricular ejection fraction, by visual estimation, is 60 to   65%.   5. Spectral Doppler shows impaired relaxation pattern of left   ventricular myocardial filling (Grade I diastolic dysfunction).   6. Elevated mean left atrial pressure.   7. Mildly enlarged right ventricle.   8. Normal left atrial size.   9. Normal right atrialsize.  10. Moderate paradoxial low gradient aortic stenosis.  11. The Dimesionless Index value is .39.  12. Moderately calcified aortic valve with mobile echodensity poorly   visualized. May represent artifact vs mobile calcification vs vegetation.   Consider WHIT if clinically indicated.  13. Mild aortic regurgitation.  14. Moderate thickening and calcification of the anterior and posterior   mitral valve leaflets.  15. Mild tricuspid regurgitation.    < end of copied text >    STRESS: none     CATH: status post PCI with RICHARDSON x2 to LAD, RICHARDSON x1 to OM1, and POBA to D1 on 2/18/2018    ELECTROPHYSIOLOGY: none     PAST MEDICAL HISTORY  Hypertension    History of abdominal aortic aneurysm (AAA)    CAD (coronary artery disease)    Alzheimers disease        PAST SURGICAL HISTORY      SOCIAL HISTORY:  Denies smoking/alcohol/drugs  CIGARETTES:     ALCOHOL:  DRUGS:    FAMILY HISTORY:    Family History of Cardiovascular Disease:  Yes [  ] No [  x]  Coronary Artery Disease in first degree relative: Yes [  ] No [  x]  Sudden Cardiac Death in First degree relative: Yes [  ] No [x  ]    HOME MEDICATIONS:      CURRENT CARDIAC MEDICATIONS:  hydrALAZINE Injectable 10 milliGRAM(s) IV Push every 2 hours PRN SBP>160  labetalol Injectable 10 milliGRAM(s) IV Push every 2 hours PRN SBP>160      CURRENT OTHER MEDICATIONS:  dexMEDEtomidine Infusion 0.2 MICROgram(s)/kG/Hr (4 mL/Hr) IV Continuous <Continuous>  levETIRAcetam  IVPB 500 milliGRAM(s) IV Intermittent every 12 hours  oxyCODONE    IR 5 milliGRAM(s) Oral every 4 hours PRN Moderate Pain (4 - 6)  oxyCODONE    IR 10 milliGRAM(s) Oral every 4 hours PRN Severe Pain (7 - 10)  polyethylene glycol 3350 17 Gram(s) Oral daily  senna 2 Tablet(s) Oral at bedtime  chlorhexidine 0.12% Liquid 15 milliLiter(s) Oral Mucosa every 12 hours  chlorhexidine 2% Cloths 1 Application(s) Topical <User Schedule>  diphtheria/tetanus/pertussis (acellular) Vaccine (Adacel) 0.5 milliLiter(s) IntraMuscular once, Stop order after: 1 Doses  sodium bicarbonate 650 milliGRAM(s) Oral three times a day  sodium chloride 2% . 1000 milliLiter(s) (60 mL/Hr) IV Continuous <Continuous>      ALLERGIES:   Allergy Status Unknown      REVIEW OF SYMPTOMS: unable to assess at pt is intubated and sedated     VITAL SIGNS:  T(C): 37.2 (11-12-23 @ 11:00), Max: 38.8 (11-11-23 @ 17:00)  T(F): 99 (11-12-23 @ 11:00), Max: 101.8 (11-11-23 @ 17:00)  HR: 66 (11-12-23 @ 11:00) (61 - 132)  BP: 122/59 (11-12-23 @ 11:00) (88/51 - 172/63)  RR: 12 (11-12-23 @ 11:00) (12 - 19)  SpO2: 100% (11-12-23 @ 11:00) (99% - 100%)    INTAKE AND OUTPUT:     11-11 @ 07:01  -  11-12 @ 07:00  --------------------------------------------------------  IN: 1932 mL / OUT: 590 mL / NET: 1342 mL    11-12 @ 07:01  -  11-12 @ 12:02  --------------------------------------------------------  IN: 180 mL / OUT: 100 mL / NET: 80 mL        PHYSICAL EXAM:  Constitutional: Comfortable . No acute distress.   HEENT: Atraumatic and normocephalic , neck is supple . no JVD. No carotid bruit.  CNS: unable to assess orientation as pt is intubated/sedated. No focal deficits.   Respiratory: CTAB, unlabored   Cardiovascular: RRR normal s1 s2. No murmur. No rubs or gallop.  Gastrointestinal: Soft, non-tender. +Bowel sounds.   Extremities: 2+ Peripheral Pulses, No clubbing, cyanosis, or edema  Psychiatric: Calm . no agitation.   Skin: Warm and dry, no ulcers on extremities     LABS:  ( 10 Nov 2023 11:00 )  Troponin T  X    ,  CPK  87   , CKMB  X    , BNP X                                  8.9    16.92 )-----------( 86       ( 12 Nov 2023 03:00 )             26.1     11-12    133<L>  |  101  |  50.0<H>  ----------------------------<  140<H>  4.8   |  18.0<L>  |  3.98<H>    Ca    8.0<L>      12 Nov 2023 03:00  Phos  4.8     11-12  Mg     2.2     11-12    TPro  5.6<L>  /  Alb  3.0<L>  /  TBili  0.8  /  DBili  x   /  AST  42<H>  /  ALT  21  /  AlkPhos  69  11-12      Urinalysis Basic - ( 12 Nov 2023 03:00 )    Color: x / Appearance: x / SG: x / pH: x  Gluc: 140 mg/dL / Ketone: x  / Bili: x / Urobili: x   Blood: x / Protein: x / Nitrite: x   Leuk Esterase: x / RBC: x / WBC x   Sq Epi: x / Non Sq Epi: x / Bacteria: x              INTERPRETATION OF TELEMETRY: NSR 60s     ECG: NSR   Prior ECG: Yes [x  ] No [  ]    RADIOLOGY & ADDITIONAL STUDIES:    X-ray:    CT scan: < from: CT Head No Cont (11.12.23 @ 09:08) >  IMPRESSION: Status post evacuation of a right-sided intraparenchymal   hemorrhage. Multicompartment intracranial hemorrhages improved to stable.   Status post right-sided subdural drainage catheter removal. New small   hypodense subdural collection at the craniectomy site.    < end of copied text >    < from: CT Head No Cont (11.10.23 @ 22:34) >      IMPRESSION: Status post evacuation of a right-sided intraparenchymal   hemorrhage. Multicompartment intracranial hemorrhages, as above.    < end of copied text >    MRI:   US:

## 2023-11-13 DIAGNOSIS — N17.9 ACUTE KIDNEY FAILURE, UNSPECIFIED: ICD-10-CM

## 2023-11-13 DIAGNOSIS — I48.0 PAROXYSMAL ATRIAL FIBRILLATION: ICD-10-CM

## 2023-11-13 DIAGNOSIS — R93.1 ABNORMAL FINDINGS ON DIAGNOSTIC IMAGING OF HEART AND CORONARY CIRCULATION: ICD-10-CM

## 2023-11-13 LAB
ANION GAP SERPL CALC-SCNC: 11 MMOL/L — SIGNIFICANT CHANGE UP (ref 5–17)
ANION GAP SERPL CALC-SCNC: 11 MMOL/L — SIGNIFICANT CHANGE UP (ref 5–17)
ANION GAP SERPL CALC-SCNC: 14 MMOL/L — SIGNIFICANT CHANGE UP (ref 5–17)
ANION GAP SERPL CALC-SCNC: 14 MMOL/L — SIGNIFICANT CHANGE UP (ref 5–17)
ANION GAP SERPL CALC-SCNC: 15 MMOL/L — SIGNIFICANT CHANGE UP (ref 5–17)
ANION GAP SERPL CALC-SCNC: 15 MMOL/L — SIGNIFICANT CHANGE UP (ref 5–17)
APPEARANCE UR: CLEAR — SIGNIFICANT CHANGE UP
APPEARANCE UR: CLEAR — SIGNIFICANT CHANGE UP
BACTERIA # UR AUTO: ABNORMAL /HPF
BACTERIA # UR AUTO: ABNORMAL /HPF
BILIRUB UR-MCNC: NEGATIVE — SIGNIFICANT CHANGE UP
BILIRUB UR-MCNC: NEGATIVE — SIGNIFICANT CHANGE UP
BUN SERPL-MCNC: 57.8 MG/DL — HIGH (ref 8–20)
BUN SERPL-MCNC: 57.8 MG/DL — HIGH (ref 8–20)
BUN SERPL-MCNC: 58.3 MG/DL — HIGH (ref 8–20)
BUN SERPL-MCNC: 58.3 MG/DL — HIGH (ref 8–20)
BUN SERPL-MCNC: 58.6 MG/DL — HIGH (ref 8–20)
BUN SERPL-MCNC: 58.6 MG/DL — HIGH (ref 8–20)
CALCIUM SERPL-MCNC: 8 MG/DL — LOW (ref 8.4–10.5)
CALCIUM SERPL-MCNC: 8 MG/DL — LOW (ref 8.4–10.5)
CALCIUM SERPL-MCNC: 8.2 MG/DL — LOW (ref 8.4–10.5)
CALCIUM SERPL-MCNC: 8.2 MG/DL — LOW (ref 8.4–10.5)
CALCIUM SERPL-MCNC: 8.4 MG/DL — SIGNIFICANT CHANGE UP (ref 8.4–10.5)
CALCIUM SERPL-MCNC: 8.4 MG/DL — SIGNIFICANT CHANGE UP (ref 8.4–10.5)
CAST: 10 /LPF — HIGH (ref 0–4)
CAST: 10 /LPF — HIGH (ref 0–4)
CHLORIDE SERPL-SCNC: 106 MMOL/L — SIGNIFICANT CHANGE UP (ref 96–108)
CHLORIDE SERPL-SCNC: 106 MMOL/L — SIGNIFICANT CHANGE UP (ref 96–108)
CHLORIDE SERPL-SCNC: 108 MMOL/L — SIGNIFICANT CHANGE UP (ref 96–108)
CHLORIDE SERPL-SCNC: 108 MMOL/L — SIGNIFICANT CHANGE UP (ref 96–108)
CHLORIDE SERPL-SCNC: 113 MMOL/L — HIGH (ref 96–108)
CHLORIDE SERPL-SCNC: 113 MMOL/L — HIGH (ref 96–108)
CK MB CFR SERPL CALC: 2.5 NG/ML — SIGNIFICANT CHANGE UP (ref 0–6.7)
CK MB CFR SERPL CALC: 2.5 NG/ML — SIGNIFICANT CHANGE UP (ref 0–6.7)
CK SERPL-CCNC: 222 U/L — HIGH (ref 30–200)
CK SERPL-CCNC: 222 U/L — HIGH (ref 30–200)
CO2 SERPL-SCNC: 19 MMOL/L — LOW (ref 22–29)
CO2 SERPL-SCNC: 22 MMOL/L — SIGNIFICANT CHANGE UP (ref 22–29)
CO2 SERPL-SCNC: 22 MMOL/L — SIGNIFICANT CHANGE UP (ref 22–29)
COLOR SPEC: YELLOW — SIGNIFICANT CHANGE UP
COLOR SPEC: YELLOW — SIGNIFICANT CHANGE UP
CREAT SERPL-MCNC: 2.63 MG/DL — HIGH (ref 0.5–1.3)
CREAT SERPL-MCNC: 2.63 MG/DL — HIGH (ref 0.5–1.3)
CREAT SERPL-MCNC: 2.98 MG/DL — HIGH (ref 0.5–1.3)
CREAT SERPL-MCNC: 2.98 MG/DL — HIGH (ref 0.5–1.3)
CREAT SERPL-MCNC: 3.1 MG/DL — HIGH (ref 0.5–1.3)
CREAT SERPL-MCNC: 3.1 MG/DL — HIGH (ref 0.5–1.3)
DIFF PNL FLD: ABNORMAL
DIFF PNL FLD: ABNORMAL
EGFR: 19 ML/MIN/1.73M2 — LOW
EGFR: 19 ML/MIN/1.73M2 — LOW
EGFR: 20 ML/MIN/1.73M2 — LOW
EGFR: 20 ML/MIN/1.73M2 — LOW
EGFR: 24 ML/MIN/1.73M2 — LOW
EGFR: 24 ML/MIN/1.73M2 — LOW
FINE GRAN CASTS #/AREA URNS AUTO: PRESENT
FINE GRAN CASTS #/AREA URNS AUTO: PRESENT
GLUCOSE BLDC GLUCOMTR-MCNC: 135 MG/DL — HIGH (ref 70–99)
GLUCOSE BLDC GLUCOMTR-MCNC: 135 MG/DL — HIGH (ref 70–99)
GLUCOSE BLDC GLUCOMTR-MCNC: 159 MG/DL — HIGH (ref 70–99)
GLUCOSE BLDC GLUCOMTR-MCNC: 159 MG/DL — HIGH (ref 70–99)
GLUCOSE SERPL-MCNC: 109 MG/DL — HIGH (ref 70–99)
GLUCOSE SERPL-MCNC: 109 MG/DL — HIGH (ref 70–99)
GLUCOSE SERPL-MCNC: 119 MG/DL — HIGH (ref 70–99)
GLUCOSE SERPL-MCNC: 119 MG/DL — HIGH (ref 70–99)
GLUCOSE SERPL-MCNC: 149 MG/DL — HIGH (ref 70–99)
GLUCOSE SERPL-MCNC: 149 MG/DL — HIGH (ref 70–99)
GLUCOSE UR QL: NEGATIVE MG/DL — SIGNIFICANT CHANGE UP
GLUCOSE UR QL: NEGATIVE MG/DL — SIGNIFICANT CHANGE UP
HCT VFR BLD CALC: 23.4 % — LOW (ref 39–50)
HCT VFR BLD CALC: 23.4 % — LOW (ref 39–50)
HGB BLD-MCNC: 8.1 G/DL — LOW (ref 13–17)
HGB BLD-MCNC: 8.1 G/DL — LOW (ref 13–17)
KETONES UR-MCNC: NEGATIVE MG/DL — SIGNIFICANT CHANGE UP
KETONES UR-MCNC: NEGATIVE MG/DL — SIGNIFICANT CHANGE UP
LEUKOCYTE ESTERASE UR-ACNC: NEGATIVE — SIGNIFICANT CHANGE UP
LEUKOCYTE ESTERASE UR-ACNC: NEGATIVE — SIGNIFICANT CHANGE UP
MAGNESIUM SERPL-MCNC: 2.5 MG/DL — SIGNIFICANT CHANGE UP (ref 1.6–2.6)
MAGNESIUM SERPL-MCNC: 2.5 MG/DL — SIGNIFICANT CHANGE UP (ref 1.6–2.6)
MCHC RBC-ENTMCNC: 32 PG — SIGNIFICANT CHANGE UP (ref 27–34)
MCHC RBC-ENTMCNC: 32 PG — SIGNIFICANT CHANGE UP (ref 27–34)
MCHC RBC-ENTMCNC: 34.6 GM/DL — SIGNIFICANT CHANGE UP (ref 32–36)
MCHC RBC-ENTMCNC: 34.6 GM/DL — SIGNIFICANT CHANGE UP (ref 32–36)
MCV RBC AUTO: 92.5 FL — SIGNIFICANT CHANGE UP (ref 80–100)
MCV RBC AUTO: 92.5 FL — SIGNIFICANT CHANGE UP (ref 80–100)
NITRITE UR-MCNC: NEGATIVE — SIGNIFICANT CHANGE UP
NITRITE UR-MCNC: NEGATIVE — SIGNIFICANT CHANGE UP
PH UR: 5.5 — SIGNIFICANT CHANGE UP (ref 5–8)
PH UR: 5.5 — SIGNIFICANT CHANGE UP (ref 5–8)
PHOSPHATE SERPL-MCNC: 5.3 MG/DL — HIGH (ref 2.4–4.7)
PHOSPHATE SERPL-MCNC: 5.3 MG/DL — HIGH (ref 2.4–4.7)
PLATELET # BLD AUTO: 88 K/UL — LOW (ref 150–400)
PLATELET # BLD AUTO: 88 K/UL — LOW (ref 150–400)
POTASSIUM SERPL-MCNC: 4.2 MMOL/L — SIGNIFICANT CHANGE UP (ref 3.5–5.3)
POTASSIUM SERPL-MCNC: 4.2 MMOL/L — SIGNIFICANT CHANGE UP (ref 3.5–5.3)
POTASSIUM SERPL-MCNC: 4.4 MMOL/L — SIGNIFICANT CHANGE UP (ref 3.5–5.3)
POTASSIUM SERPL-MCNC: 4.4 MMOL/L — SIGNIFICANT CHANGE UP (ref 3.5–5.3)
POTASSIUM SERPL-MCNC: 4.6 MMOL/L — SIGNIFICANT CHANGE UP (ref 3.5–5.3)
POTASSIUM SERPL-MCNC: 4.6 MMOL/L — SIGNIFICANT CHANGE UP (ref 3.5–5.3)
POTASSIUM SERPL-SCNC: 4.2 MMOL/L — SIGNIFICANT CHANGE UP (ref 3.5–5.3)
POTASSIUM SERPL-SCNC: 4.2 MMOL/L — SIGNIFICANT CHANGE UP (ref 3.5–5.3)
POTASSIUM SERPL-SCNC: 4.4 MMOL/L — SIGNIFICANT CHANGE UP (ref 3.5–5.3)
POTASSIUM SERPL-SCNC: 4.4 MMOL/L — SIGNIFICANT CHANGE UP (ref 3.5–5.3)
POTASSIUM SERPL-SCNC: 4.6 MMOL/L — SIGNIFICANT CHANGE UP (ref 3.5–5.3)
POTASSIUM SERPL-SCNC: 4.6 MMOL/L — SIGNIFICANT CHANGE UP (ref 3.5–5.3)
PROT UR-MCNC: 100 MG/DL
PROT UR-MCNC: 100 MG/DL
RBC # BLD: 2.53 M/UL — LOW (ref 4.2–5.8)
RBC # BLD: 2.53 M/UL — LOW (ref 4.2–5.8)
RBC # FLD: 12.8 % — SIGNIFICANT CHANGE UP (ref 10.3–14.5)
RBC # FLD: 12.8 % — SIGNIFICANT CHANGE UP (ref 10.3–14.5)
RBC CASTS # UR COMP ASSIST: 4 /HPF — SIGNIFICANT CHANGE UP (ref 0–4)
RBC CASTS # UR COMP ASSIST: 4 /HPF — SIGNIFICANT CHANGE UP (ref 0–4)
SODIUM SERPL-SCNC: 139 MMOL/L — SIGNIFICANT CHANGE UP (ref 135–145)
SODIUM SERPL-SCNC: 139 MMOL/L — SIGNIFICANT CHANGE UP (ref 135–145)
SODIUM SERPL-SCNC: 141 MMOL/L — SIGNIFICANT CHANGE UP (ref 135–145)
SODIUM SERPL-SCNC: 141 MMOL/L — SIGNIFICANT CHANGE UP (ref 135–145)
SODIUM SERPL-SCNC: 147 MMOL/L — HIGH (ref 135–145)
SODIUM SERPL-SCNC: 147 MMOL/L — HIGH (ref 135–145)
SP GR SPEC: 1.02 — SIGNIFICANT CHANGE UP (ref 1–1.03)
SP GR SPEC: 1.02 — SIGNIFICANT CHANGE UP (ref 1–1.03)
SQUAMOUS # UR AUTO: 1 /HPF — SIGNIFICANT CHANGE UP (ref 0–5)
SQUAMOUS # UR AUTO: 1 /HPF — SIGNIFICANT CHANGE UP (ref 0–5)
TROPONIN T, HIGH SENSITIVITY RESULT: 88 NG/L — HIGH (ref 0–51)
TROPONIN T, HIGH SENSITIVITY RESULT: 88 NG/L — HIGH (ref 0–51)
TROPONIN T, HIGH SENSITIVITY RESULT: 94 NG/L — HIGH (ref 0–51)
TROPONIN T, HIGH SENSITIVITY RESULT: 94 NG/L — HIGH (ref 0–51)
TSH SERPL-MCNC: 1.48 UIU/ML — SIGNIFICANT CHANGE UP (ref 0.27–4.2)
TSH SERPL-MCNC: 1.48 UIU/ML — SIGNIFICANT CHANGE UP (ref 0.27–4.2)
UROBILINOGEN FLD QL: 1 MG/DL — SIGNIFICANT CHANGE UP (ref 0.2–1)
UROBILINOGEN FLD QL: 1 MG/DL — SIGNIFICANT CHANGE UP (ref 0.2–1)
WBC # BLD: 12.97 K/UL — HIGH (ref 3.8–10.5)
WBC # BLD: 12.97 K/UL — HIGH (ref 3.8–10.5)
WBC # FLD AUTO: 12.97 K/UL — HIGH (ref 3.8–10.5)
WBC # FLD AUTO: 12.97 K/UL — HIGH (ref 3.8–10.5)
WBC UR QL: 1 /HPF — SIGNIFICANT CHANGE UP (ref 0–5)
WBC UR QL: 1 /HPF — SIGNIFICANT CHANGE UP (ref 0–5)

## 2023-11-13 PROCEDURE — 99232 SBSQ HOSP IP/OBS MODERATE 35: CPT

## 2023-11-13 PROCEDURE — 71045 X-RAY EXAM CHEST 1 VIEW: CPT | Mod: 26

## 2023-11-13 PROCEDURE — 93970 EXTREMITY STUDY: CPT | Mod: 26

## 2023-11-13 PROCEDURE — 93010 ELECTROCARDIOGRAM REPORT: CPT

## 2023-11-13 PROCEDURE — 99231 SBSQ HOSP IP/OBS SF/LOW 25: CPT

## 2023-11-13 PROCEDURE — 70551 MRI BRAIN STEM W/O DYE: CPT | Mod: 26

## 2023-11-13 PROCEDURE — 99291 CRITICAL CARE FIRST HOUR: CPT

## 2023-11-13 RX ORDER — DOCUSATE SODIUM 100 MG
1 CAPSULE ORAL
Qty: 0 | Refills: 0 | DISCHARGE

## 2023-11-13 RX ORDER — DONEPEZIL HYDROCHLORIDE 10 MG/1
1 TABLET, FILM COATED ORAL
Refills: 0 | DISCHARGE

## 2023-11-13 RX ORDER — ATORVASTATIN CALCIUM 80 MG/1
1 TABLET, FILM COATED ORAL
Refills: 0 | DISCHARGE

## 2023-11-13 RX ORDER — ASPIRIN/CALCIUM CARB/MAGNESIUM 324 MG
1 TABLET ORAL
Qty: 0 | Refills: 0 | DISCHARGE

## 2023-11-13 RX ORDER — CLOPIDOGREL BISULFATE 75 MG/1
1 TABLET, FILM COATED ORAL
Qty: 0 | Refills: 0 | DISCHARGE

## 2023-11-13 RX ORDER — METOPROLOL TARTRATE 50 MG
1 TABLET ORAL
Qty: 0 | Refills: 0 | DISCHARGE

## 2023-11-13 RX ORDER — WARFARIN SODIUM 2.5 MG/1
2.5 TABLET ORAL
Qty: 0 | Refills: 0 | DISCHARGE

## 2023-11-13 RX ORDER — ATORVASTATIN CALCIUM 80 MG/1
1 TABLET, FILM COATED ORAL
Qty: 0 | Refills: 0 | DISCHARGE

## 2023-11-13 RX ORDER — LATANOPROST 0.05 MG/ML
1 SOLUTION/ DROPS OPHTHALMIC; TOPICAL
Qty: 0 | Refills: 0 | DISCHARGE

## 2023-11-13 RX ADMIN — Medication 15 MILLIGRAM(S): at 00:14

## 2023-11-13 RX ADMIN — Medication 650 MILLIGRAM(S): at 05:40

## 2023-11-13 RX ADMIN — CHLORHEXIDINE GLUCONATE 1 APPLICATION(S): 213 SOLUTION TOPICAL at 05:40

## 2023-11-13 RX ADMIN — LEVETIRACETAM 400 MILLIGRAM(S): 250 TABLET, FILM COATED ORAL at 05:40

## 2023-11-13 RX ADMIN — Medication 650 MILLIGRAM(S): at 22:55

## 2023-11-13 RX ADMIN — Medication 650 MILLIGRAM(S): at 11:19

## 2023-11-13 RX ADMIN — LEVETIRACETAM 400 MILLIGRAM(S): 250 TABLET, FILM COATED ORAL at 17:29

## 2023-11-13 RX ADMIN — SODIUM CHLORIDE 60 MILLILITER(S): 5 INJECTION, SOLUTION INTRAVENOUS at 05:40

## 2023-11-13 RX ADMIN — Medication 30 MILLIGRAM(S): at 11:19

## 2023-11-13 RX ADMIN — Medication 30 MILLIGRAM(S): at 20:25

## 2023-11-13 RX ADMIN — Medication 5 MILLIGRAM(S): at 00:28

## 2023-11-13 RX ADMIN — CHLORHEXIDINE GLUCONATE 15 MILLILITER(S): 213 SOLUTION TOPICAL at 05:39

## 2023-11-13 RX ADMIN — Medication 30 MILLIGRAM(S): at 05:39

## 2023-11-13 RX ADMIN — CHLORHEXIDINE GLUCONATE 15 MILLILITER(S): 213 SOLUTION TOPICAL at 17:29

## 2023-11-13 NOTE — PROGRESS NOTE ADULT - ASSESSMENT
83 yo male POD#3 s/p a right decompressive hemicraniectomy by Dr. Larios    -Medical management per NSICU  -Pain Control, avoid over sedation  -Goal SBP 90 to 160  -Keppra for seizure ppx  -Keep HOB < 30 degrees   -Wean to extubate as tolerated   -further plan pending AM rounds

## 2023-11-13 NOTE — PROGRESS NOTE ADULT - PROBLEM SELECTOR PLAN 1
.  - current NSR rate controlled on tele  - not candidate for long term AC given traumatic SDH, can consider elective outpatient LAAO eval.  - resume ASA when able  - TTE preserved EF  - continue IVP diltiazem  - labetalol and hydralazine PRN as needed to meet BP goals

## 2023-11-13 NOTE — PROGRESS NOTE ADULT - ASSESSMENT
Chief complaint:   Patient is a 82y old  Male who presents with a chief complaint of s/p unwitnessed fall with head strike (11 Nov 2023 11:05)      This Patient is critically Ill due to the following diagnoses:  ICH sp Craniectomy  Respiratory Failure    NEURO  #Traumatic ICH  ISO DAPT with AC  - Neurochecks  - SBP <160  - HOLD AC/APA  - Ppx Keppra for 7 days    CV  SBP Goal: <160  CAD: resume ASA when NSGY safe  TTE: Possible vegetation?  - Apprecaite Cards for possible WHIT  - BCx without growth in 48h       PULM  Extubated 11/13  SpO2 > 92%    GI  TF via OGT  Bowel Regemin    ID  #Fevers  Possibly Central  Tmax 11/11 101.8 with rising WBC  UA (11/11) - clear  CXR - No consolidations  Sputum Cx - Klebsiella  BCx - NGTD  - No abx at this time, if Tmax >101.3 or hemodynamic instability will start abx for Klebsiella Tracheobronchitis    RENAL  #DONNIE  Previous nephrectomy, likely 2/2 ROXANNE +/- intraoperative stress  - Monitor, avoid nephrotoxic meds  - Appreciate Renal    #Hyponatremia  - 2% with goal normal    ENDO  Glucose <180 with LSS    HEME  Plt >100  VTE Ppx: SCD, SQL    DISPO: ICU    My full attention was spent providing medically necessary critical care to the patient with details documented in my note above.   Critical care time spent examining patient, reviewing vitals, labs, medications, imaging and discussing with the team goals of care   The combined critical care time provided to the patient was between 60 minutes  This time does not include bedside procedures that are documented separately.

## 2023-11-13 NOTE — PROGRESS NOTE ADULT - ASSESSMENT
82-year-old male with hypertension, CAD, left renal cell carcinoma s/p nephrectomy and prior DONNIE requiring temporary hemodialysis presents with fall and traumatic SDH/SAH s/p right decompressive hemicraniectomy.  Nephrology following for acute kidney injury:    1.  Acute kidney injury:  – Baseline serum creatinine less than 1  – Acute kidney injury from ATN likely from ROXANNE, Creatinine peaked at 4 and now improving down to 2.98 mg/deciliter today  –UA with granular cast  –Nonoliguric, expecting serum creatinine to continue improve, maintain MAP more than 65 and avoid further nephrotoxins.    2.  Hyperkalemia: Resolved  3.  Metabolic acidosis: Improving, on p.o. sodium bicarbonate to continue for now  4.  To continue on 2% normal saline, goal sodium 145–150 per neurology in setting of ICH.  5.  SDH/SAH s/p right decompressive hemicraniectomy.  Management per neurology.

## 2023-11-13 NOTE — PROGRESS NOTE ADULT - NS ATTEND AMEND GEN_ALL_CORE FT
patient admitted to neuro ICU service, WHIT with possible veg on MV vs calcification, negative blood cx so far, suggest ID consult re. WHIT necessary?  call back as needed.

## 2023-11-13 NOTE — PROGRESS NOTE ADULT - SUBJECTIVE AND OBJECTIVE BOX
**Patient was still intubated when seen earlier this morning.    Subjective/overnight: Currently intubated with plans of extubation later today.  Decent urine output.    Review of system: Unable to obtain secondary to patient current clinical condition.    Physical Examination:  Gen: intubated  MS: Following commands  HENT: ETT+  CV: rhythm reg reg, rate normal, no m/g/r, no LE edema  Chest: CTAB  Abd: soft, non distended  Skin: dry, warm, no rash or jaundice  [place]+    Vital Signs Last 24 Hrs  T(C): 37.9 (13 Nov 2023 14:00), Max: 38.1 (12 Nov 2023 18:00)  T(F): 100.2 (13 Nov 2023 14:00), Max: 100.6 (12 Nov 2023 18:00)  HR: 80 (13 Nov 2023 14:00) (60 - 123)  BP: 126/58 (13 Nov 2023 14:00) (103/48 - 151/73)  BP(mean): 78 (13 Nov 2023 14:00) (66 - 100)  RR: 20 (13 Nov 2023 14:00) (13 - 22)  SpO2: 96% (13 Nov 2023 14:00) (95% - 100%)    Parameters below as of 13 Nov 2023 12:59    O2 Flow (L/min): 4    I&O's Summary    12 Nov 2023 07:01  -  13 Nov 2023 07:00  --------------------------------------------------------  IN: 1925.4 mL / OUT: 965 mL / NET: 960.4 mL    13 Nov 2023 07:01  -  13 Nov 2023 14:58  --------------------------------------------------------  IN: 521.7 mL / OUT: 345 mL / NET: 176.7 mL      Current Antibiotics:    Other medications:  chlorhexidine 0.12% Liquid 15 milliLiter(s) Oral Mucosa every 12 hours  chlorhexidine 2% Cloths 1 Application(s) Topical <User Schedule>  dexMEDEtomidine Infusion 0.2 MICROgram(s)/kG/Hr IV Continuous <Continuous>  diltiazem    Tablet 30 milliGRAM(s) Oral every 6 hours  diltiazem Injectable 20 milliGRAM(s) IV Push once  diphtheria/tetanus/pertussis (acellular) Vaccine (Adacel) 0.5 milliLiter(s) IntraMuscular once  levETIRAcetam  IVPB 500 milliGRAM(s) IV Intermittent every 12 hours  polyethylene glycol 3350 17 Gram(s) Oral daily  senna 2 Tablet(s) Oral at bedtime  sodium bicarbonate 650 milliGRAM(s) Oral three times a day  sodium chloride 2% . 1000 milliLiter(s) IV Continuous <Continuous>    11-13    141  |  108  |  58.6<H>  ----------------------------<  149<H>  4.4   |  22.0  |  2.98<H>    Ca    8.2<L>      13 Nov 2023 07:40  Phos  5.3     11-13  Mg     2.5     11-13    TPro  5.6<L>  /  Alb  3.0<L>  /  TBili  0.8  /  DBili  x   /  AST  42<H>  /  ALT  21  /  AlkPhos  69  11-12    Creatinine: 2.98 mg/dL (11-13-23 @ 07:40)  Creatinine: 3.10 mg/dL (11-13-23 @ 01:24)  Creatinine: 4.00 mg/dL (11-12-23 @ 17:55)  Creatinine: 3.98 mg/dL (11-12-23 @ 03:00)  Creatinine: 2.61 mg/dL (11-11-23 @ 03:25)  Creatinine: 1.48 mg/dL (11-10-23 @ 18:30)  Creatinine: 0.84 mg/dL (11-10-23 @ 12:30)  Creatinine: 1.23 mg/dL (11-10-23 @ 09:07)

## 2023-11-13 NOTE — PROGRESS NOTE ADULT - PROBLEM SELECTOR PLAN 3
.  - Dr. Gibson reviewed TTE images cannot confirm vegetation, possible calcifications from aV stenosis,   - BCX NGTD as of 11/11, repeat from 11/12 & 11/13 pending results  - if bacteremic would obtain WHIT to confirm vegetation as would change duration of antibiotics course  - not candidate for open heart surgery for valve replacement given recent brain surgery  -current antiplatelets on hold, resume ASA 81mg for CAD/stents when stable from neurosurgery standpoint      Cardiology will follow up as needed. Please call if BCX are + to schedule WHIT. .  - Dr. Gibson reviewed TTE images cannot confirm vegetation, possible calcifications from aV stenosis,   - BCX NGTD as of 11/11, repeat from 11/12 & 11/13 pending results  - if bacteremic would obtain WHIT to confirm vegetation as would change duration of antibiotics course, however no indication for WHIT at this time. Recommend ID consult.   - not candidate for open heart surgery for valve replacement given recent brain surgery  - current antiplatelets on hold, resume ASA 81mg for CAD/stents when stable from neurosurgery standpoint      Cardiology will follow up when called if needed.

## 2023-11-13 NOTE — PROGRESS NOTE ADULT - ASSESSMENT
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 Jose Anderson in [place] last seen 4/2023, presents with unwitnessed fall with traumatic SDH/SAH POD#2 from R-decompressive hemicraniectomy remains intubated, noted febrile episodes yesterday with leukocytosis, blood cultures sent, TTE done with reports AV lesion

## 2023-11-13 NOTE — PROGRESS NOTE ADULT - SUBJECTIVE AND OBJECTIVE BOX
[facility]YS[facility]                                                         CARDIOLOGY AT [facility]                                                                  [address], [place] [address]                                                         Telephone: [phone]. Fax:[phone]                                                                             PROGRESS NOTE    Reason for follow up: r/o endocardtitis  Update:  remains intubated in neuro icu, now NSR on tele rate controlled 80s.       Review of symptoms:   Patient is intubated and cannot participate in exam due to current medical condition      Vitals:  T(C): 37.3 (11-13-23 @ 08:00), Max: 38.1 (11-12-23 @ 18:00)  HR: 80 (11-13-23 @ 08:00) (60 - 123)  BP: 126/55 (11-13-23 @ 08:00) (103/48 - 151/73)  RR: 16 (11-13-23 @ 08:00) (12 - 20)  SpO2: 98% (11-13-23 @ 08:00) (98% - 100%)  Wt(kg): --  I&O's Summary    12 Nov 2023 07:01  -  13 Nov 2023 07:00  --------------------------------------------------------  IN: 1925.4 mL / OUT: 965 mL / NET: 960.4 mL    13 Nov 2023 07:01  -  13 Nov 2023 09:34  --------------------------------------------------------  IN: 60 mL / OUT: 45 mL / NET: 15 mL      Weight (kg): 80 (11-10 @ 12:01)    PHYSICAL EXAM:  Appearance: Comfortable. No acute distress  HEENT:  Atraumatic. Normocephalic.  Normal oral mucosa  Neurologic: A & O x 3, no gross focal deficits.  Cardiovascular: RRR S1 S2, No murmur, no rubs/gallops. No JVD  Respiratory: Lungs clear to auscultation, unlabored   Gastrointestinal:  Soft, Non-tender, + BS  Lower Extremities: 2+ Peripheral Pulses, No clubbing, cyanosis, or edema  Psychiatry: Patient is calm. No agitation.   Skin: warm and dry.    CURRENT CARDIAC MEDICATIONS:  diltiazem    Tablet 30 milliGRAM(s) Oral every 6 hours  diltiazem Injectable 20 milliGRAM(s) IV Push once  hydrALAZINE Injectable 10 milliGRAM(s) IV Push every 2 hours PRN  labetalol Injectable 10 milliGRAM(s) IV Push every 2 hours PRN      CURRENT OTHER MEDICATIONS:  dexMEDEtomidine Infusion 0.2 MICROgram(s)/kG/Hr (4 mL/Hr) IV Continuous <Continuous>  levETIRAcetam  IVPB 500 milliGRAM(s) IV Intermittent every 12 hours  oxyCODONE    IR 5 milliGRAM(s) Oral every 4 hours PRN Moderate Pain (4 - 6)  oxyCODONE    IR 10 milliGRAM(s) Oral every 4 hours PRN Severe Pain (7 - 10)  polyethylene glycol 3350 17 Gram(s) Oral daily  senna 2 Tablet(s) Oral at bedtime  chlorhexidine 0.12% Liquid 15 milliLiter(s) Oral Mucosa every 12 hours  chlorhexidine 2% Cloths 1 Application(s) Topical <User Schedule>  diphtheria/tetanus/pertussis (acellular) Vaccine (Adacel) 0.5 milliLiter(s) IntraMuscular once, Stop order after: 1 Doses  sodium bicarbonate 650 milliGRAM(s) Oral three times a day  sodium chloride 2% . 1000 milliLiter(s) (60 mL/Hr) IV Continuous <Continuous>      LABS:	 	  ( 13 Nov 2023 01:24 )  Troponin T  X    ,  CPK  222<H>, CKMB  X    , BNP X        , ( 10 Nov 2023 11:00 )  Troponin T  X    ,  CPK  87   , CKMB  X    , BNP X                                  8.1    12.97 )-----------( 88       ( 13 Nov 2023 01:24 )             23.4     11-13    141  |  108  |  58.6<H>  ----------------------------<  149<H>  4.4   |  22.0  |  2.98<H>    Ca    8.2<L>      13 Nov 2023 07:40  Phos  5.3     11-13  Mg     2.5     11-13    TPro  5.6<L>  /  Alb  3.0<L>  /  TBili  0.8  /  DBili  x   /  AST  42<H>  /  ALT  21  /  AlkPhos  69  11-12    PT/INR/PTT ( 10 Nov 2023 11:00 )                       :                       :      11.5         :       30.8                  .        .                   .              .           .       1.04        .                                       Lipid Profile:   HgA1c:   TSH: Thyroid Stimulating Hormone, Serum: 1.48 uIU/mL      TELEMETRY:   ECG:    DIAGNOSTIC TESTING:  [ ] Echocardiogram:  < from: TTE Echo Complete w/o Contrast w/ Doppler (11.11.23 @ 08:58) >  PHYSICIAN INTERPRETATION:  Left Ventricle:Endocardial visualization was enhanced with intravenous   echo contrast. The left ventricular internal cavity size is normal.  Global LV systolic function was normal. Left ventricular ejection   fraction, by visual estimation, is 60 to 65%. Spectral Doppler shows   impaired relaxation pattern of left ventricular myocardial filling (Grade   I diastolic dysfunction). Elevated mean left atrial pressure.  Right Ventricle: The right ventricular size is mildly enlarged. RV   systolic function is normal.  Left Atrium: Normal left atrial size.  Right Atrium: Normal right atrial size.  Pericardium: There is no evidence of pericardial effusion.  Mitral Valve: Moderate thickening and calcification of the anterior and   posterior mitral valve leaflets.  Tricuspid Valve: The tricuspid valve is normal in structure. Mild   tricuspid regurgitation is visualized.  Aortic Valve: Mild aortic valve regurgitation is seen. Moderate   paradoxial low gradient aortic stenosis. The Dimesionless Index value is   .39.  Pulmonic Valve: The pulmonic valve was not well visualized.  Aorta: The aortic root is normal in size and structure.  Pulmonary Artery: The pulmonary artery is not well seen.  Venous: A normal flow pattern is recorded from the right upper pulmonary   vein. Patient on Mechanical ventilation unable to assess Right Atrial   pressure.  In comparison to the previous echocardiogram(s): There are no prior   studies on this patient for comparison purposes.      Summary:   1. Endocardial visualization was enhanced with intravenous echo contrast.   2. Normal global left ventricular systolic function.   3. There is mild concentric left ventricular hypertrophy.   4. Left ventricular ejection fraction, by visual estimation, is 60 to   65%.   5. Spectral Doppler shows impaired relaxation pattern of left   ventricular myocardial filling (Grade I diastolic dysfunction).   6. Elevated mean left atrial pressure.   7. Mildly enlarged right ventricle.   8. Normal left atrial size.   9. Normal right atrialsize.  10. Moderate paradoxial low gradient aortic stenosis.  11. The Dimesionless Index value is .39.  12. Moderately calcified aortic valve with mobile echodensity poorly   visualized. May represent artifact vs mobile calcification vs vegetation.   Consider WHIT if clinically indicated.  13. Mild aortic regurgitation.  14. Moderate thickening and calcification of the anterior and posterior   mitral valve leaflets.  15. Mild tricuspid regurgitation.    MD January Electronically signed on 11/11/2023 at 4:33:10 PM    < end of copied text >    [ ]  Catheterization:  [ ] Stress Test:    OTHER:

## 2023-11-13 NOTE — PROGRESS NOTE ADULT - SUBJECTIVE AND OBJECTIVE BOX
Chief complaint:   Patient is a 82y old  Male who presents with a chief complaint of s/p unwitnessed fall with head strike (11 Nov 2023 11:05)    HPI:  82y M with PMH HTN, CAD s/p stents, renal cell carcinoma status post left nephrectomy, bladder CA, BPH, CHF, LBBB, vertigo,  HLD, 5.5cm AAA without rupture post EVAR, paroxysmal A-fib on Eliquis, Plavix, and aspirin, early stage Alzheimer dementia transferred from [facility] s/p unwitnessed fall with head strike at home found by wife on floor at 8am. Patient brought to urgent care by wife and had 5 staples to posterior scalp but was instructed to go to nearest ED.  CT head at [place] showed R frontal IPH, SDH, SAH at 9:00. CTA stroke protocol not performed at [facility]. Patient BIB on 6L NC.  Pt GCS 15 on arrival, A&Ox2 on arrival. After initial assessment, patient noted to be vomiting enroute to CT scanner.     24hr EVENTS:  11/10 - OR for decompression.  11/11 - Tolerating CPAP  11/12 - CTH with slight worse edema and subgaleal blood collection on personal read. worse exam as below  11/13 - Afebrile with improving WBC. Exam improved in AM. Extubated. sp 1U Plts    ROS: [ ]  Unable to assess due to mental status   All other systems negative    -----------------------------------------------------------------------------------------------------------------------------------------------------------------------------------    PHYSICAL EXAM:  General: intubated  HEENT: MMM  Neuro:  -Mental status- No acute distress  -CN- PERRL 3mm, EOMI, tongue midline, L facial  - R full strength following commands  - L Hemiparesis    CV: RRR  Pulm: clear to auscultation  Abd: Soft, nontender, nondistended  Ext: no noted edema in lower ext  Skin: warm, dry      ------------------------------------------------------------------------------------------------------  ICU Vital Signs Last 24 Hrs  T(C): 37.5 (12 Nov 2023 13:00), Max: 38.8 (11 Nov 2023 17:00)  T(F): 99.5 (12 Nov 2023 13:00), Max: 101.8 (11 Nov 2023 17:00)  HR: 67 (12 Nov 2023 13:00) (61 - 132)  BP: 120/64 (12 Nov 2023 13:00) (88/51 - 166/62)  BP(mean): 82 (12 Nov 2023 13:00) (64 - 97)  ABP: --  ABP(mean): --  RR: 15 (12 Nov 2023 13:00) (12 - 19)  SpO2: 100% (12 Nov 2023 13:00) (99% - 100%)    O2 Parameters below as of 12 Nov 2023 12:00  Patient On (Oxygen Delivery Method): ventilator    O2 Concentration (%): 40        I&O's Summary    11 Nov 2023 07:01  -  12 Nov 2023 07:00  --------------------------------------------------------  IN: 1932 mL / OUT: 590 mL / NET: 1342 mL    12 Nov 2023 07:01  -  12 Nov 2023 14:36  --------------------------------------------------------  IN: 180 mL / OUT: 100 mL / NET: 80 mL        MEDICATIONS  (STANDING):  chlorhexidine 0.12% Liquid 15 milliLiter(s) Oral Mucosa every 12 hours  chlorhexidine 2% Cloths 1 Application(s) Topical <User Schedule>  dexMEDEtomidine Infusion 0.2 MICROgram(s)/kG/Hr (4 mL/Hr) IV Continuous <Continuous>  diphtheria/tetanus/pertussis (acellular) Vaccine (Adacel) 0.5 milliLiter(s) IntraMuscular once  levETIRAcetam  IVPB 500 milliGRAM(s) IV Intermittent every 12 hours  polyethylene glycol 3350 17 Gram(s) Oral daily  senna 2 Tablet(s) Oral at bedtime  sodium bicarbonate 650 milliGRAM(s) Oral three times a day  sodium chloride 2% . 1000 milliLiter(s) (60 mL/Hr) IV Continuous <Continuous>      RESPIRATORY:  Mode: CPAP with PS  FiO2: 30  PEEP: 6  PS: 8  MAP: 9  PIP: 19      IMAGING:   Recent imaging studies were reviewed.    LAB RESULTS:                          8.9    16.92 )-----------( 86       ( 12 Nov 2023 03:00 )             26.1           11-12    133<L>  |  101  |  50.0<H>  ----------------------------<  140<H>  4.8   |  18.0<L>  |  3.98<H>    Ca    8.0<L>      12 Nov 2023 03:00  Phos  4.8     11-12  Mg     2.2     11-12    TPro  5.6<L>  /  Alb  3.0<L>  /  TBili  0.8  /  DBili  x   /  AST  42<H>  /  ALT  21  /  AlkPhos  69  11-12                Culture - Sputum (collected 11-11-23 @ 16:11)  Source: ET Tube ET Tube  Gram Stain (11-12-23 @ 11:50):    Numerous polymorphonuclear leukocytes per low power field    Few Squamous epithelial cells per low power field    Few Yeast like cells per oil power field    Numerous Gram Negative Rods per oil power field

## 2023-11-13 NOTE — PROGRESS NOTE ADULT - SUBJECTIVE AND OBJECTIVE BOX
HPI:  82y M with PMH HTN, CAD s/p stents, renal cell carcinoma status post left nephrectomy, bladder CA, BPH, CHF, LBBB, vertigo,  HLD, 5.5cm AAA without rupture post EVAR, paroxysmal A-fib on Eliquis, Plavix, and aspirin, early stage Alzheimer dementia transferred from [facility] s/p unwitnessed fall with head strike at home found by wife on floor at 8am. Patient brought to urgent care by wife and had 5 staples to posterior scalp but was instructed to go to nearest ED.  CT head at [place] showed R frontal IPH, SDH, SAH at 9:00. CTA stroke protocol not performed at [facility]. Patient BIB on 6L NC.  Pt GCS 15 on arrival, A&Ox2 on arrival. After initial assessment, patient noted to be vomiting enroute to CT scanner.   (10 Nov 2023 11:04)      INTERVAL HPI/OVERNIGHT EVENTS:  Pt seen and evaluated this morning. No new reports of vomiting dizziness from overnight.       Vital Signs Last 24 Hrs  T(C): 37.2 (13 Nov 2023 01:30), Max: 38.1 (12 Nov 2023 18:00)  T(F): 99 (13 Nov 2023 01:30), Max: 100.6 (12 Nov 2023 18:00)  HR: 58 (13 Nov 2023 01:30) (58 - 123)  BP: 121/64 (13 Nov 2023 01:00) (88/51 - 151/73)  BP(mean): 78 (13 Nov 2023 01:00) (64 - 100)  RR: 15 (13 Nov 2023 01:30) (12 - 20)  SpO2: 100% (13 Nov 2023 04:17) (99% - 100%)    Parameters below as of 12 Nov 2023 20:00  Patient On (Oxygen Delivery Method): ventilator      PHYSICAL EXAM:  GENERAL: NAD, well-groomed  HEAD: s/p R craniectomy, flap full, soft   WOUND: Dressing clean dry intact  MENTAL STATUS: weak EO w noxious  CRANIAL NERVES: RADHA, FC on the R  MOTOR: strength LUE extend, LLE WD, RUE 5/5, RLE 5/5  EXTREMITIES: no clubbing, cyanosis  SKIN: Warm, dry      LABS:                        8.1    12.97 )-----------( 88       ( 13 Nov 2023 01:24 )             23.4     11-13    139  |  106  |  58.3<H>  ----------------------------<  109<H>  4.6   |  19.0<L>  |  3.10<H>    Ca    8.0<L>      13 Nov 2023 01:24  Phos  5.3     11-13  Mg     2.5     11-13    TPro  5.6<L>  /  Alb  3.0<L>  /  TBili  0.8  /  DBili  x   /  AST  42<H>  /  ALT  21  /  AlkPhos  69  11-12      Urinalysis Basic - ( 13 Nov 2023 01:24 )    Color: x / Appearance: x / SG: x / pH: x  Gluc: 109 mg/dL / Ketone: x  / Bili: x / Urobili: x   Blood: x / Protein: x / Nitrite: x   Leuk Esterase: x / RBC: x / WBC x   Sq Epi: x / Non Sq Epi: x / Bacteria: x      11-11 @ 07:01 - 11-12 @ 07:00  --------------------------------------------------------  IN: 1932 mL / OUT: 590 mL / NET: 1342 mL    11-12 @ 07:01 - 11-13 @ 04:30  --------------------------------------------------------  IN: 1275.4 mL / OUT: 695 mL / NET: 580.4 mL

## 2023-11-13 NOTE — CHART NOTE - NSCHARTNOTEFT_GEN_A_CORE
Ethics consult initiated.    Cased discussed in detail with JODIE RICHARDSON (Neuro ICU) and JENY Montenegro MA (Ethics Fellow) on 11/12/2023. On 11/13/2023, JENY Montenegro MA discussed the case and social complexities in detail with LATANYA RICHARDSON (Neuro ICU) and HARI Peters MD (Neuro ICU Attending) and they expressed the need to determine an appropriate surrogate decision maker for the patient. JENY Montenegro MA met with Aminta, the patient's significant other at bedside on 11/13/2023, and she explained she has been with the patient for 35 years and lived together for most of that time. However, the patient's daughters make medical decisions for him, because that is what he decided on a prior admission to [facility]. JENY Montenegro MA spoke with the patient's daughter, Jennifer, on the phone, and Jennifer confirmed that the patient completed a HCP form during that past admission. Jennifer stated she will look for the form before she comes to visit the patient today.    Chart Review by JENY Montenegro MA found the HCP Form completed on 2/18/2018. The patient did name his two daughters Jennifer and Micaela as the primary HCP and his sister María Elena as the alternate HCP, however, the form was not witnessed by two people and therefore is not valid.    JENY Montenegro MA met with Jennifer at bedside and explained that the old HCP form is not valid. Jennifer explained that the patient is still legally  but has been estranged from her mother for around 30 years with no contact. JENY Montenegro MA explained that given the patient is estranged with no contact from his wife there is potential conflict of interest with her as the surrogate, so Jennifer and Micaela would be the patient's surrogates per the Family Health Care Decisions Act (2010). Jennifer expressed understanding and appreciation for the help.    At this time, the appropriate surrogates for the patient are his daughters Jennifer and Angela. The team should continue to engage with them for medical decision making. Jennifer can be reached at [phone] and Angela can be reached at [phone].    Full consult to follow.    Joi Montenegro MA  Ethics Fellow  [phone] Ethics consult initiated.    Cased discussed in detail with JODIE RICHARDSON (Neuro ICU) and JENY Montenegro MA (Ethics Fellow) on 11/12/2023. On 11/13/2023, JENY Montenegro MA discussed the case and social complexities in detail with LATANYA RICHARDSON (Neuro ICU) and HARI Peters MD (Neuro ICU Attending) and they expressed the need to determine an appropriate surrogate decision maker for the patient. JENY Montenegro MA met with Aminta, the patient's significant other at bedside on 11/13/2023, and she explained she has been with the patient for 35 years and lived together for most of that time. However, the patient's daughters make medical decisions for him, because that is what he decided on a prior admission to [facility]. JENY Montenegro MA spoke with the patient's daughter, Jennifer, on the phone, and Jennifer confirmed that the patient completed a HCP form during that past admission. Jennifer stated she will look for the form before she comes to visit the patient today.    Chart Review by JENY Montenegro MA found the HCP Form completed on 2/18/2018. The patient did name his two daughters Jennifer and Micaela as the primary HCP and his sister María Elena as the alternate HCP, however, the form was not witnessed by two people and therefore is not valid.    JENY Montenegro MA met with Jennifer at bedside and explained that the old HCP form is not valid. Jennifer explained that the patient is still legally  but has been estranged from her mother for around 30 years with no contact. JENY Montenegro MA explained that given the patient is estranged with no contact from his wife there is potential conflict of interest with her as the surrogate, so Jennifer and Micaela would be the patient's surrogates per the Family Health Care Decisions Act (2010). Jennifer expressed understanding and appreciation for the help.    At this time, the appropriate surrogates for the patient are his daughters Jennifer and Angela. The team should continue to engage with them for medical decision making. Jennifer can be reached at [phone] and Angela can be reached at [phone].    Full consult to follow.    Joi Montenegro MA  Ethics Fellow  [phone] Ethics consult initiated.    Cased discussed in detail with JODIE RICHARDSON (Neuro ICU) and JENY Montenegro MA (Ethics Fellow) on 11/12/2023. On 11/13/2023, JENY Montenegro MA discussed the case and social complexities in detail with LATANYA RICHARDSON (Neuro ICU) and HARI Peters MD (Neuro ICU Attending) and they expressed the need to determine an appropriate surrogate decision maker for the patient. JENY Montenegro MA met with Aminta, the patient's significant other at bedside on 11/13/2023, and she explained she has been with the patient for 35 years and lived together for most of that time. However, the patient's daughters make medical decisions for him, because that is what he decided on a prior admission to [facility]. JENY Montenegro MA spoke with the patient's daughter, Jennifer, on the phone, and Jennifer confirmed that the patient completed a HCP form during that past admission. Jennifer stated she will look for the form before she comes to visit the patient today.    Chart Review by JENY Montenegro MA found the HCP Form completed on 2/18/2018. The patient did name his two daughters Jennifer and Micaela as the primary HCP and his sister María Elena as the alternate HCP, however, the form was not witnessed by two people and therefore is not valid.    JENY Montenegro MA met with Jennifer at bedside and explained that the old HCP form is not valid. Jennifer explained that the patient is still legally  but has been estranged from her mother for around 30 years with no contact. JENY Montenegro MA explained that given the patient is estranged with no contact from his wife there is potential conflict of interest with her as the surrogate, so Jennifer and Micaela would be the patient's surrogates per the Family Health Care Decisions Act (2010). Jennifer expressed understanding and appreciation for the help.    At this time, the appropriate surrogates for the patient are his daughters Jennifer and Angela. The team should continue to engage with them for medical decision making. Jennifer can be reached at [phone] and Angela can be reached at [phone].    Full consult to follow.    Joi Montenegro MA  Ethics Fellow  [phone] Ethics consult initiated.    Cased discussed in detail with JODIE RICHARDSON (Neuro ICU) and JENY Montenegro MA (Ethics Fellow) on 11/12/2023. On 11/13/2023, JENY Montenegro MA discussed the case and social complexities in detail with LATANYA RICHARDSON (Neuro ICU) and HARI Peters MD (Neuro ICU Attending) and they expressed the need to determine an appropriate surrogate decision maker for the patient. JENY Montenegro MA met with Aminta, the patient's significant other at bedside on 11/13/2023, and she explained she has been with the patient for 35 years and lived together for most of that time. However, the patient's daughters make medical decisions for him, because that is what he decided on a prior admission to [facility]. JENY Montenegro MA spoke with the patient's daughter, Jennifer, on the phone, and Jennifer confirmed that the patient completed a HCP form during that past admission. Jennifer stated she will look for the form before she comes to visit the patient today.    Chart Review by JENY Montenegro MA found the HCP Form completed on 2/18/2018. The patient did name his two daughters Jennifer and Micaela as the primary HCP and his sister María Elena as the alternate HCP, however, the form was not witnessed by two people and therefore is not valid.    JENY Montenegro MA met with Jennifer at bedside and explained that the old HCP form is not valid. Jennifer explained that the patient is still legally  but has been estranged from her mother for around 30 years with no contact. JENY Montenegro MA explained that given the patient is estranged with no contact from his wife there is potential conflict of interest with her as the surrogate, so Jennifer and Micaela would be the patient's surrogates per the Family Health Care Decisions Act (2010). Jennifer expressed understanding and appreciation for the help.    At this time, the appropriate surrogates for the patient are his daughters Jennifer and Angela. The team should continue to engage with them for medical decision making. Jennifer can be reached at [phone] and Angela can be reached at [phone].    Full consult to follow.    Joi Montenegro MA  Ethics Fellow  [phone]    Agree with above.   Dory Damon MD Ethics consult initiated.    Cased discussed in detail with JODIE RICHARDSON (Neuro ICU) and JENY Montenegro MA (Ethics Fellow) on 11/12/2023. On 11/13/2023, JENY Montenegro MA discussed the case and social complexities in detail with LATANYA RICHARDSON (Neuro ICU) and HARI Peters MD (Neuro ICU Attending) and they expressed the need to determine an appropriate surrogate decision maker for the patient. JENY Montenegro MA met with Aminta, the patient's significant other at bedside on 11/13/2023, and she explained she has been with the patient for 35 years and lived together for most of that time. However, the patient's daughters make medical decisions for him, because that is what he decided on a prior admission to [facility]. JENY Montenegro MA spoke with the patient's daughter, Jennifer, on the phone, and Jennifer confirmed that the patient completed a HCP form during that past admission. Jennifer stated she will look for the form before she comes to visit the patient today.    Chart Review by JENY Montenegro MA found the HCP Form completed on 2/18/2018. The patient did name his two daughters Jennifer and Micaela as the primary HCP and his sister María Elena as the alternate HCP, however, the form was not witnessed by two people and therefore is not valid.    JENY Montenegro MA met with Jennifer at bedside and explained that the old HCP form is not valid. Jennifer explained that the patient is still legally  but has been estranged from her mother for around 30 years with no contact. JENY Montenegro MA explained that given the patient is estranged with no contact from his wife there is potential conflict of interest with her as the surrogate, so Jennifer and Micaela would be the patient's surrogates per the Family Health Care Decisions Act (2010). Jennifer expressed understanding and appreciation for the help.    At this time, the appropriate surrogates for the patient are his daughters Jennifer and Angela. The team should continue to engage with them for medical decision making. Jennifer can be reached at [phone] and Angela can be reached at [phone].    Full consult to follow.    Joi Montenegro MA  Ethics Fellow  [phone]    Agree with above.   Dory Damon MD Ethics consult initiated.    Cased discussed in detail with JODIE RICHARDSON (Neuro ICU) and JENY Montenegro MA (Ethics Fellow) on 11/12/2023. On 11/13/2023, JENY Montenegro MA discussed the case and social complexities in detail with LATANYA RICHARDSON (Neuro ICU) and HARI Peters MD (Neuro ICU Attending) and they expressed the need to determine an appropriate surrogate decision maker for the patient. JENY Montenegro MA met with Aminta, the patient's significant other at bedside on 11/13/2023, and she explained she has been with the patient for 35 years and lived together for most of that time. However, the patient's daughters make medical decisions for him, because that is what he decided on a prior admission to [facility]. JENY Montenegro MA spoke with the patient's daughter, Jennifer, on the phone, and Jennifer confirmed that the patient completed a HCP form during that past admission. Jennifer stated she will look for the form before she comes to visit the patient today.    Chart Review by JENY Montenegro MA found the HCP Form completed on 2/18/2018. The patient did name his two daughters Jennifer and Micaela as the primary HCP and his sister María Elena as the alternate HCP, however, the form was not witnessed by two people and therefore is not valid.    JENY Montenegro MA met with Jennifer at bedside and explained that the old HCP form is not valid. Jennifer explained that the patient is still legally  but has been estranged from her mother for around 30 years with no contact. JENY Montenegro MA explained that given the patient is estranged with no contact from his wife there is potential conflict of interest with her as the surrogate, so Jennifer and Micaela would be the patient's surrogates per the Family Health Care Decisions Act (2010). Jennifer expressed understanding and appreciation for the help.    At this time, the appropriate surrogates for the patient are his daughters Jennifer and Angela. The team should continue to engage with them for medical decision making. Jennifer can be reached at [phone] and Angela can be reached at [phone].    Full consult to follow.    Joi Montenegro MA  Ethics Fellow  [phone]    Agree with above.   Dory Damon MD

## 2023-11-13 NOTE — PROGRESS NOTE ADULT - NS ATTEND AMEND GEN_ALL_CORE FT
Neurosurgery Attending Attestation:    Patient seen and examined at bedside. Agree with plan and note as documented above.    82 y/o M s/p R craniectomy for ICH evacuation 11/10/2023, FC in RUE and RLE. Trace movement in LUE and LLE. Wean to extubate. Care per NCCU.    -Marty Larios MD

## 2023-11-14 LAB
-  AMOXICILLIN/CLAVULANIC ACID: SIGNIFICANT CHANGE UP
-  AMOXICILLIN/CLAVULANIC ACID: SIGNIFICANT CHANGE UP
-  AMPICILLIN/SULBACTAM: SIGNIFICANT CHANGE UP
-  AMPICILLIN/SULBACTAM: SIGNIFICANT CHANGE UP
-  AMPICILLIN: SIGNIFICANT CHANGE UP
-  AMPICILLIN: SIGNIFICANT CHANGE UP
-  AZTREONAM: SIGNIFICANT CHANGE UP
-  AZTREONAM: SIGNIFICANT CHANGE UP
-  CEFAZOLIN: SIGNIFICANT CHANGE UP
-  CEFAZOLIN: SIGNIFICANT CHANGE UP
-  CEFEPIME: SIGNIFICANT CHANGE UP
-  CEFEPIME: SIGNIFICANT CHANGE UP
-  CEFOTAXIME: SIGNIFICANT CHANGE UP
-  CEFOTAXIME: SIGNIFICANT CHANGE UP
-  CEFOXITIN: SIGNIFICANT CHANGE UP
-  CEFOXITIN: SIGNIFICANT CHANGE UP
-  CEFTAZIDIME: SIGNIFICANT CHANGE UP
-  CEFTAZIDIME: SIGNIFICANT CHANGE UP
-  CEFTRIAXONE: SIGNIFICANT CHANGE UP
-  CEFTRIAXONE: SIGNIFICANT CHANGE UP
-  CIPROFLOXACIN: SIGNIFICANT CHANGE UP
-  CIPROFLOXACIN: SIGNIFICANT CHANGE UP
-  ERTAPENEM: SIGNIFICANT CHANGE UP
-  ERTAPENEM: SIGNIFICANT CHANGE UP
-  GENTAMICIN: SIGNIFICANT CHANGE UP
-  GENTAMICIN: SIGNIFICANT CHANGE UP
-  IMIPENEM: SIGNIFICANT CHANGE UP
-  IMIPENEM: SIGNIFICANT CHANGE UP
-  LEVOFLOXACIN: SIGNIFICANT CHANGE UP
-  LEVOFLOXACIN: SIGNIFICANT CHANGE UP
-  MEROPENEM: SIGNIFICANT CHANGE UP
-  MEROPENEM: SIGNIFICANT CHANGE UP
-  PIPERACILLIN/TAZOBACTAM: SIGNIFICANT CHANGE UP
-  PIPERACILLIN/TAZOBACTAM: SIGNIFICANT CHANGE UP
-  TOBRAMYCIN: SIGNIFICANT CHANGE UP
-  TOBRAMYCIN: SIGNIFICANT CHANGE UP
-  TRIMETHOPRIM/SULFAMETHOXAZOLE: SIGNIFICANT CHANGE UP
-  TRIMETHOPRIM/SULFAMETHOXAZOLE: SIGNIFICANT CHANGE UP
A1C WITH ESTIMATED AVERAGE GLUCOSE RESULT: 5.4 % — SIGNIFICANT CHANGE UP (ref 4–5.6)
A1C WITH ESTIMATED AVERAGE GLUCOSE RESULT: 5.4 % — SIGNIFICANT CHANGE UP (ref 4–5.6)
A1C WITH ESTIMATED AVERAGE GLUCOSE RESULT: 5.6 % — SIGNIFICANT CHANGE UP (ref 4–5.6)
A1C WITH ESTIMATED AVERAGE GLUCOSE RESULT: 5.6 % — SIGNIFICANT CHANGE UP (ref 4–5.6)
ALBUMIN SERPL ELPH-MCNC: 3.4 G/DL — SIGNIFICANT CHANGE UP (ref 3.3–5.2)
ALBUMIN SERPL ELPH-MCNC: 3.4 G/DL — SIGNIFICANT CHANGE UP (ref 3.3–5.2)
ALP SERPL-CCNC: 75 U/L — SIGNIFICANT CHANGE UP (ref 40–120)
ALP SERPL-CCNC: 75 U/L — SIGNIFICANT CHANGE UP (ref 40–120)
ALT FLD-CCNC: 13 U/L — SIGNIFICANT CHANGE UP
ALT FLD-CCNC: 13 U/L — SIGNIFICANT CHANGE UP
ANION GAP SERPL CALC-SCNC: 14 MMOL/L — SIGNIFICANT CHANGE UP (ref 5–17)
AST SERPL-CCNC: 20 U/L — SIGNIFICANT CHANGE UP
AST SERPL-CCNC: 20 U/L — SIGNIFICANT CHANGE UP
BILIRUB DIRECT SERPL-MCNC: 0.2 MG/DL — SIGNIFICANT CHANGE UP (ref 0–0.3)
BILIRUB DIRECT SERPL-MCNC: 0.2 MG/DL — SIGNIFICANT CHANGE UP (ref 0–0.3)
BILIRUB INDIRECT FLD-MCNC: 0.6 MG/DL — SIGNIFICANT CHANGE UP (ref 0.2–1)
BILIRUB INDIRECT FLD-MCNC: 0.6 MG/DL — SIGNIFICANT CHANGE UP (ref 0.2–1)
BILIRUB SERPL-MCNC: 0.7 MG/DL — SIGNIFICANT CHANGE UP (ref 0.4–2)
BILIRUB SERPL-MCNC: 0.7 MG/DL — SIGNIFICANT CHANGE UP (ref 0.4–2)
BUN SERPL-MCNC: 55.5 MG/DL — HIGH (ref 8–20)
BUN SERPL-MCNC: 55.5 MG/DL — HIGH (ref 8–20)
BUN SERPL-MCNC: 55.8 MG/DL — HIGH (ref 8–20)
BUN SERPL-MCNC: 55.8 MG/DL — HIGH (ref 8–20)
CALCIUM SERPL-MCNC: 8.6 MG/DL — SIGNIFICANT CHANGE UP (ref 8.4–10.5)
CALCIUM SERPL-MCNC: 8.6 MG/DL — SIGNIFICANT CHANGE UP (ref 8.4–10.5)
CALCIUM SERPL-MCNC: 8.9 MG/DL — SIGNIFICANT CHANGE UP (ref 8.4–10.5)
CALCIUM SERPL-MCNC: 8.9 MG/DL — SIGNIFICANT CHANGE UP (ref 8.4–10.5)
CHLORIDE SERPL-SCNC: 119 MMOL/L — HIGH (ref 96–108)
CHOLEST SERPL-MCNC: 125 MG/DL — SIGNIFICANT CHANGE UP
CHOLEST SERPL-MCNC: 125 MG/DL — SIGNIFICANT CHANGE UP
CK MB CFR SERPL CALC: 2.3 NG/ML — SIGNIFICANT CHANGE UP (ref 0–6.7)
CK MB CFR SERPL CALC: 2.3 NG/ML — SIGNIFICANT CHANGE UP (ref 0–6.7)
CK SERPL-CCNC: 169 U/L — SIGNIFICANT CHANGE UP (ref 30–200)
CK SERPL-CCNC: 169 U/L — SIGNIFICANT CHANGE UP (ref 30–200)
CO2 SERPL-SCNC: 18 MMOL/L — LOW (ref 22–29)
CO2 SERPL-SCNC: 18 MMOL/L — LOW (ref 22–29)
CO2 SERPL-SCNC: 19 MMOL/L — LOW (ref 22–29)
CO2 SERPL-SCNC: 19 MMOL/L — LOW (ref 22–29)
CREAT SERPL-MCNC: 2.06 MG/DL — HIGH (ref 0.5–1.3)
CREAT SERPL-MCNC: 2.06 MG/DL — HIGH (ref 0.5–1.3)
CREAT SERPL-MCNC: 2.21 MG/DL — HIGH (ref 0.5–1.3)
CREAT SERPL-MCNC: 2.21 MG/DL — HIGH (ref 0.5–1.3)
CULTURE RESULTS: ABNORMAL
CULTURE RESULTS: ABNORMAL
EGFR: 29 ML/MIN/1.73M2 — LOW
EGFR: 29 ML/MIN/1.73M2 — LOW
EGFR: 32 ML/MIN/1.73M2 — LOW
EGFR: 32 ML/MIN/1.73M2 — LOW
ESTIMATED AVERAGE GLUCOSE: 108 MG/DL — SIGNIFICANT CHANGE UP (ref 68–114)
ESTIMATED AVERAGE GLUCOSE: 108 MG/DL — SIGNIFICANT CHANGE UP (ref 68–114)
ESTIMATED AVERAGE GLUCOSE: 114 MG/DL — SIGNIFICANT CHANGE UP (ref 68–114)
ESTIMATED AVERAGE GLUCOSE: 114 MG/DL — SIGNIFICANT CHANGE UP (ref 68–114)
GLUCOSE SERPL-MCNC: 120 MG/DL — HIGH (ref 70–99)
GLUCOSE SERPL-MCNC: 120 MG/DL — HIGH (ref 70–99)
GLUCOSE SERPL-MCNC: 125 MG/DL — HIGH (ref 70–99)
GLUCOSE SERPL-MCNC: 125 MG/DL — HIGH (ref 70–99)
HCT VFR BLD CALC: 24.1 % — LOW (ref 39–50)
HCT VFR BLD CALC: 24.1 % — LOW (ref 39–50)
HDLC SERPL-MCNC: 31 MG/DL — LOW
HDLC SERPL-MCNC: 31 MG/DL — LOW
HGB BLD-MCNC: 8 G/DL — LOW (ref 13–17)
HGB BLD-MCNC: 8 G/DL — LOW (ref 13–17)
LIPID PNL WITH DIRECT LDL SERPL: 58 MG/DL — SIGNIFICANT CHANGE UP
LIPID PNL WITH DIRECT LDL SERPL: 58 MG/DL — SIGNIFICANT CHANGE UP
MAGNESIUM SERPL-MCNC: 2.6 MG/DL — SIGNIFICANT CHANGE UP (ref 1.6–2.6)
MAGNESIUM SERPL-MCNC: 2.6 MG/DL — SIGNIFICANT CHANGE UP (ref 1.6–2.6)
MCHC RBC-ENTMCNC: 31 PG — SIGNIFICANT CHANGE UP (ref 27–34)
MCHC RBC-ENTMCNC: 31 PG — SIGNIFICANT CHANGE UP (ref 27–34)
MCHC RBC-ENTMCNC: 33.2 GM/DL — SIGNIFICANT CHANGE UP (ref 32–36)
MCHC RBC-ENTMCNC: 33.2 GM/DL — SIGNIFICANT CHANGE UP (ref 32–36)
MCV RBC AUTO: 93.4 FL — SIGNIFICANT CHANGE UP (ref 80–100)
MCV RBC AUTO: 93.4 FL — SIGNIFICANT CHANGE UP (ref 80–100)
METHOD TYPE: SIGNIFICANT CHANGE UP
METHOD TYPE: SIGNIFICANT CHANGE UP
NON HDL CHOLESTEROL: 94 MG/DL — SIGNIFICANT CHANGE UP
NON HDL CHOLESTEROL: 94 MG/DL — SIGNIFICANT CHANGE UP
ORGANISM # SPEC MICROSCOPIC CNT: ABNORMAL
ORGANISM # SPEC MICROSCOPIC CNT: ABNORMAL
ORGANISM # SPEC MICROSCOPIC CNT: SIGNIFICANT CHANGE UP
ORGANISM # SPEC MICROSCOPIC CNT: SIGNIFICANT CHANGE UP
PHOSPHATE SERPL-MCNC: 3.1 MG/DL — SIGNIFICANT CHANGE UP (ref 2.4–4.7)
PHOSPHATE SERPL-MCNC: 3.1 MG/DL — SIGNIFICANT CHANGE UP (ref 2.4–4.7)
PLATELET # BLD AUTO: 176 K/UL — SIGNIFICANT CHANGE UP (ref 150–400)
PLATELET # BLD AUTO: 176 K/UL — SIGNIFICANT CHANGE UP (ref 150–400)
POTASSIUM SERPL-MCNC: 4.2 MMOL/L — SIGNIFICANT CHANGE UP (ref 3.5–5.3)
POTASSIUM SERPL-SCNC: 4.2 MMOL/L — SIGNIFICANT CHANGE UP (ref 3.5–5.3)
PROT SERPL-MCNC: 6.6 G/DL — SIGNIFICANT CHANGE UP (ref 6.6–8.7)
PROT SERPL-MCNC: 6.6 G/DL — SIGNIFICANT CHANGE UP (ref 6.6–8.7)
RBC # BLD: 2.58 M/UL — LOW (ref 4.2–5.8)
RBC # BLD: 2.58 M/UL — LOW (ref 4.2–5.8)
RBC # FLD: 13.2 % — SIGNIFICANT CHANGE UP (ref 10.3–14.5)
RBC # FLD: 13.2 % — SIGNIFICANT CHANGE UP (ref 10.3–14.5)
SODIUM SERPL-SCNC: 151 MMOL/L — HIGH (ref 135–145)
SODIUM SERPL-SCNC: 151 MMOL/L — HIGH (ref 135–145)
SODIUM SERPL-SCNC: 152 MMOL/L — HIGH (ref 135–145)
SODIUM SERPL-SCNC: 152 MMOL/L — HIGH (ref 135–145)
SPECIMEN SOURCE: SIGNIFICANT CHANGE UP
SPECIMEN SOURCE: SIGNIFICANT CHANGE UP
TRIGL SERPL-MCNC: 181 MG/DL — HIGH
TRIGL SERPL-MCNC: 181 MG/DL — HIGH
WBC # BLD: 11.57 K/UL — HIGH (ref 3.8–10.5)
WBC # BLD: 11.57 K/UL — HIGH (ref 3.8–10.5)
WBC # FLD AUTO: 11.57 K/UL — HIGH (ref 3.8–10.5)
WBC # FLD AUTO: 11.57 K/UL — HIGH (ref 3.8–10.5)

## 2023-11-14 PROCEDURE — 99223 1ST HOSP IP/OBS HIGH 75: CPT

## 2023-11-14 PROCEDURE — 99291 CRITICAL CARE FIRST HOUR: CPT

## 2023-11-14 PROCEDURE — 71045 X-RAY EXAM CHEST 1 VIEW: CPT | Mod: 26

## 2023-11-14 RX ORDER — ACETYLCYSTEINE 200 MG/ML
4 VIAL (ML) MISCELLANEOUS EVERY 6 HOURS
Refills: 0 | Status: COMPLETED | OUTPATIENT
Start: 2023-11-14 | End: 2023-11-16

## 2023-11-14 RX ORDER — CEFEPIME 1 G/1
INJECTION, POWDER, FOR SOLUTION INTRAMUSCULAR; INTRAVENOUS
Refills: 0 | Status: DISCONTINUED | OUTPATIENT
Start: 2023-11-14 | End: 2023-11-14

## 2023-11-14 RX ORDER — CEFEPIME 1 G/1
2000 INJECTION, POWDER, FOR SOLUTION INTRAMUSCULAR; INTRAVENOUS ONCE
Refills: 0 | Status: COMPLETED | OUTPATIENT
Start: 2023-11-14 | End: 2023-11-14

## 2023-11-14 RX ORDER — IPRATROPIUM/ALBUTEROL SULFATE 18-103MCG
3 AEROSOL WITH ADAPTER (GRAM) INHALATION EVERY 6 HOURS
Refills: 0 | Status: COMPLETED | OUTPATIENT
Start: 2023-11-14 | End: 2023-11-16

## 2023-11-14 RX ORDER — ACETAMINOPHEN 500 MG
1000 TABLET ORAL ONCE
Refills: 0 | Status: COMPLETED | OUTPATIENT
Start: 2023-11-14 | End: 2023-11-14

## 2023-11-14 RX ORDER — ALBUMIN HUMAN 25 %
50 VIAL (ML) INTRAVENOUS ONCE
Refills: 0 | Status: COMPLETED | OUTPATIENT
Start: 2023-11-14 | End: 2023-11-14

## 2023-11-14 RX ORDER — LEVETIRACETAM 250 MG/1
500 TABLET, FILM COATED ORAL
Refills: 0 | Status: DISCONTINUED | OUTPATIENT
Start: 2023-11-14 | End: 2023-11-17

## 2023-11-14 RX ORDER — CEFEPIME 1 G/1
INJECTION, POWDER, FOR SOLUTION INTRAMUSCULAR; INTRAVENOUS
Refills: 0 | Status: DISCONTINUED | OUTPATIENT
Start: 2023-11-14 | End: 2023-11-19

## 2023-11-14 RX ORDER — ATORVASTATIN CALCIUM 80 MG/1
40 TABLET, FILM COATED ORAL AT BEDTIME
Refills: 0 | Status: DISCONTINUED | OUTPATIENT
Start: 2023-11-14 | End: 2023-11-15

## 2023-11-14 RX ORDER — HYALURONIDASE (HUMAN RECOMBINANT) 150 [USP'U]/ML
150 INJECTION, SOLUTION SUBCUTANEOUS ONCE
Refills: 0 | Status: COMPLETED | OUTPATIENT
Start: 2023-11-14 | End: 2023-11-14

## 2023-11-14 RX ORDER — MEMANTINE HYDROCHLORIDE 10 MG/1
5 TABLET ORAL
Refills: 0 | Status: DISCONTINUED | OUTPATIENT
Start: 2023-11-14 | End: 2023-11-15

## 2023-11-14 RX ORDER — CEFEPIME 1 G/1
2000 INJECTION, POWDER, FOR SOLUTION INTRAMUSCULAR; INTRAVENOUS EVERY 12 HOURS
Refills: 0 | Status: DISCONTINUED | OUTPATIENT
Start: 2023-11-15 | End: 2023-11-19

## 2023-11-14 RX ADMIN — LEVETIRACETAM 500 MILLIGRAM(S): 250 TABLET, FILM COATED ORAL at 18:16

## 2023-11-14 RX ADMIN — Medication 650 MILLIGRAM(S): at 22:43

## 2023-11-14 RX ADMIN — ATORVASTATIN CALCIUM 40 MILLIGRAM(S): 80 TABLET, FILM COATED ORAL at 22:43

## 2023-11-14 RX ADMIN — Medication 30 MILLIGRAM(S): at 01:12

## 2023-11-14 RX ADMIN — Medication 30 MILLIGRAM(S): at 23:57

## 2023-11-14 RX ADMIN — Medication 4 MILLILITER(S): at 14:36

## 2023-11-14 RX ADMIN — Medication 50 MILLILITER(S): at 18:25

## 2023-11-14 RX ADMIN — Medication 30 MILLIGRAM(S): at 18:16

## 2023-11-14 RX ADMIN — Medication 400 MILLIGRAM(S): at 23:38

## 2023-11-14 RX ADMIN — POLYETHYLENE GLYCOL 3350 17 GRAM(S): 17 POWDER, FOR SOLUTION ORAL at 13:24

## 2023-11-14 RX ADMIN — CEFEPIME 2000 MILLIGRAM(S): 1 INJECTION, POWDER, FOR SOLUTION INTRAMUSCULAR; INTRAVENOUS at 18:16

## 2023-11-14 RX ADMIN — Medication 3 MILLILITER(S): at 20:08

## 2023-11-14 RX ADMIN — Medication 30 MILLIGRAM(S): at 13:24

## 2023-11-14 RX ADMIN — Medication 4 MILLILITER(S): at 20:08

## 2023-11-14 RX ADMIN — Medication 650 MILLIGRAM(S): at 06:13

## 2023-11-14 RX ADMIN — LEVETIRACETAM 400 MILLIGRAM(S): 250 TABLET, FILM COATED ORAL at 06:13

## 2023-11-14 RX ADMIN — Medication 3 MILLILITER(S): at 14:36

## 2023-11-14 RX ADMIN — HYALURONIDASE (HUMAN RECOMBINANT) 150 UNIT(S): 150 INJECTION, SOLUTION SUBCUTANEOUS at 08:50

## 2023-11-14 RX ADMIN — MEMANTINE HYDROCHLORIDE 5 MILLIGRAM(S): 10 TABLET ORAL at 18:17

## 2023-11-14 RX ADMIN — Medication 30 MILLIGRAM(S): at 06:12

## 2023-11-14 RX ADMIN — Medication 650 MILLIGRAM(S): at 13:24

## 2023-11-14 RX ADMIN — CHLORHEXIDINE GLUCONATE 1 APPLICATION(S): 213 SOLUTION TOPICAL at 06:13

## 2023-11-14 NOTE — CHART NOTE - NSCHARTNOTEFT_GEN_A_CORE
Downgrade to medicine canceled given possible airway concerns - hospitalist Dr. Fuentes aware downgrade is canceled.  Discussed with Neuro ICU team.  Logistics called and attending of record changed to Dr. Gonzalez.   Please see Neuro ICU attending progress note for full plan.

## 2023-11-14 NOTE — PROGRESS NOTE ADULT - ASSESSMENT
82M with PMH HTN, HLD pAF (On Eliquis) CAD s/p stents (On DAPT), renal cell carcinoma status post left nephrectomy, bladder CA, BPH, CHF, LBBB, HLD, 5.5cm AAA without rupture post EVAR, early stage Alzheimer dementia transferred from [facility] s/p unwitnessed fall with head strike at home. CT head at [place] showed R frontal IPH, SDH, SAH. Taken to OR for urgent right decompressive craniectomy with Dr. Larios and admitted to Neuro ICU still intubated. Nephrology consulted 11/11 for non-oliguric DONNIE. CCB fever on POD #1. TTE performed on 11/11, found to have calcified AV with mobile echodensity, cardiology consulted. Blood cultures NGTD. CCB rapid AF on 11/13, started on Cardizem Patient extubated on 11/13.      #Traumatic ICH, iso AC and DAPT   #Alzheimer's Dementia    S/p Craniectomy   Per Neuro ICU Continue Q2 Neurochecks, SBP goal <160  Holding AC and Anti PLTs pending further NSGY and Cardio recs  Seizure ppx with Keppra for 7 days (11/10 - )   Pending Helmet eval    Alzheimer's Dementia AO 2 at baseline  Cont Home [place]     #CAD  resume ASA/Plavix when safe per NSGY. Would Involve Cardiology in decision as well. Patient may not need to be on all three agents (DAPT/Eliquis)   Statin on hold pending Lipid panel, LFTs, CPK ordered by Neuro ICU    #AF RVR  Continue Cardizem  AC on hold     #Abnormal TTE - AV Echodensity  Possible vegetation?, cardiology following for possible WHIT, f/u recommendations   BCx NTD  F/u repeats still pending     #Acute Respiratory Failure in setting of TBI  Was kept intubated post op and extubated on 11/13  Sputum Cx were collected for Post op fever. Sputum growing Klebsiella on 11/11. Antibiotics were held   CXR clear. On RA. 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 Has a GF.   Patient elected daughters as proxy.

## 2023-11-14 NOTE — PROGRESS NOTE ADULT - NS ATTEND AMEND GEN_ALL_CORE FT
Neurosurgery Attending Attestation:    Patient seen and examined at bedside. Agree with plan and note as documented above.    82 y/o M s/p R craniectomy for ICH evacuation 11/10/2023, now s/p extubation, oriented to self, not hospital or year, FC in RUE and RLE. Trace movement in LUE and LLE. Care per NCCU.    -Marty Larios MD

## 2023-11-14 NOTE — CHART NOTE - NSCHARTNOTEFT_GEN_A_CORE
Neuro ICU Transfer Note    Transfer to: ( X ) Medicine    ( X ) Telemetry       HPI:  82y M with PMH HTN, CAD s/p stents, renal cell carcinoma status post left nephrectomy, bladder CA, BPH, CHF, LBBB, vertigo,  HLD, 5.5cm AAA without rupture post EVAR, paroxysmal A-fib on Eliquis, Plavix, and aspirin, early stage Alzheimer dementia transferred from [facility] s/p unwitnessed fall with head strike at home found by wife on floor at 8am. Patient brought to urgent care by wife and had 5 staples to posterior scalp but was instructed to go to nearest ED.  CT head at [place] showed R frontal IPH, SDH, SAH at 9:00. CTA stroke protocol not performed at [facility]. Patient BIB on 6L NC.  Pt GCS 15 on arrival, A&Ox2 on arrival. After initial assessment, patient noted to be vomiting enroute to CT scanner.    (10 Nov 2023 11:04)    Neuro ICU COURSE:  Patient trauma B upon arrival to [facility], OR for right decompressive craniectomy with Dr. Larios, brought to NeuroICU intubated and hemodynamically stable. Nephrology consulted 11/11 for non-oliguric DONNIE, now improving. Patient febrile on POD #1, pan-cultured. TTE performed on 11, found to have calcified AV with mobile echodensity, cannot r/o vegetation, cardiology consulted. Blood cultures have had NGTD. Patient in rapid AF on 11/13, started on cardizem. Patient extubated on 11/13.           Vital Signs Last 24 Hrs  T(C): 37.4 (14 Nov 2023 07:00), Max: 38.1 (13 Nov 2023 19:00)  T(F): 99.3 (14 Nov 2023 07:00), Max: 100.6 (13 Nov 2023 19:00)  HR: 95 (14 Nov 2023 07:00) (65 - 99)  BP: 160/76 (14 Nov 2023 07:00) (105/74 - 160/76)  BP(mean): 99 (14 Nov 2023 07:00) (75 - 105)  RR: 21 (14 Nov 2023 07:00) (15 - 24)  SpO2: 95% (14 Nov 2023 07:00) (91% - 100%)    Parameters below as of 13 Nov 2023 18:00  Patient On (Oxygen Delivery Method): room air      I&O's Summary    13 Nov 2023 07:01  -  14 Nov 2023 07:00  --------------------------------------------------------  IN: 1683.4 mL / OUT: 1255 mL / NET: 428.4 mL        MEDICATIONS  (STANDING):  chlorhexidine 2% Cloths 1 Application(s) Topical <User Schedule>  dexMEDEtomidine Infusion 0.2 MICROgram(s)/kG/Hr (4 mL/Hr) IV Continuous <Continuous>  diltiazem    Tablet 30 milliGRAM(s) Oral every 6 hours  diltiazem Injectable 20 milliGRAM(s) IV Push once  diphtheria/tetanus/pertussis (acellular) Vaccine (Adacel) 0.5 milliLiter(s) IntraMuscular once  levETIRAcetam  IVPB 500 milliGRAM(s) IV Intermittent every 12 hours  polyethylene glycol 3350 17 Gram(s) Oral daily  senna 2 Tablet(s) Oral at bedtime  sodium bicarbonate 650 milliGRAM(s) Oral three times a day    MEDICATIONS  (PRN):  hydrALAZINE Injectable 10 milliGRAM(s) IV Push every 2 hours PRN SBP>160  labetalol Injectable 10 milliGRAM(s) IV Push every 2 hours PRN SBP>160  oxyCODONE    IR 5 milliGRAM(s) Oral every 4 hours PRN Moderate Pain (4 - 6)  oxyCODONE    IR 10 milliGRAM(s) Oral every 4 hours PRN Severe Pain (7 - 10)        LABS                                            8.0                   Neurophils% (auto):   x      (11-14 @ 04:46):    11.57)-----------(176          Lymphocytes% (auto):  x                                             24.1                   Eosinphils% (auto):   x        Manual%: Neutrophils x    ; Lymphocytes x    ; Eosinophils x    ; Bands%: x    ; Blasts x                                    151    |  119    |  55.8                Calcium: 8.6   / iCa: x      (11-14 @ 04:46)    ----------------------------<  125       Magnesium: 2.6                              4.2     |  18.0   |  2.21             Phosphorous: 3.1          RADIOLOGY:  < from: CT Head No Cont (11.12.23 @ 09:08) >    Status post evacuation of a right-sided intraparenchymal   hemorrhage. Multicompartment intracranial hemorrhages improved to stable.   Status post right-sided subdural drainage catheter removal. New small   hypodense subdural collection at the craniectomy site.    < end of copied text >      < from: US Duplex Venous Upper Ext Complete, Bilateral (11.13.23 @ 19:20) >    No evidence of deep venous thrombosis in either upper extremity.    Superficial thrombus noted in the left cephalic vein.    < end of copied text >      < from: MR Head No Cont (11.13.23 @ 21:36) >    RIGHT frontoparietal craniectomy with underlying   intracranial hemorrhage and edema within the RIGHT frontal lobe.   Overlying small RIGHT subdural hemorrhage is also unchanged. Scant   hemorrhage in the dependent portions of the BILATERAL lateral ventricles   and minimal subarachnoid hemorrhage seen in the BILATERAL frontal and   parietal lobes. Moderate periventricular and deep white matter ischemia   is noted. Encephalomalacia and gliosis seen in the anterior frontal lobes   bilaterally.Tiny acute lacunar infarction in the LEFT cerebellum and   tiny acute cortical infarction in the RIGHT parietal lobe. Restricted   diffusion also noted about the surgical cavity suggesting infarcted   parenchyma.    < end of copied text >        ASSESSMENT & PLAN:             For Followup: Neuro ICU Transfer Note    Transfer to: ( X ) Medicine    ( X ) Telemetry       HPI:  82y M with PMH HTN, CAD s/p stents, renal cell carcinoma status post left nephrectomy, bladder CA, BPH, CHF, LBBB, vertigo,  HLD, 5.5cm AAA without rupture post EVAR, paroxysmal A-fib on Eliquis, Plavix, and aspirin, early stage Alzheimer dementia transferred from [facility] s/p unwitnessed fall with head strike at home found by wife on floor at 8am. Patient brought to urgent care by wife and had 5 staples to posterior scalp but was instructed to go to nearest ED.  CT head at [place] showed R frontal IPH, SDH, SAH at 9:00. CTA stroke protocol not performed at [facility]. Patient BIB on 6L NC.  Pt GCS 15 on arrival, A&Ox2 on arrival. After initial assessment, patient noted to be vomiting enroute to CT scanner.    (10 Nov 2023 11:04)    Neuro ICU COURSE:  Patient trauma B upon arrival to [facility], OR for right decompressive craniectomy with Dr. Larios, brought to NeuroICU intubated and hemodynamically stable. Nephrology consulted 11/11 for non-oliguric DONNIE, now improving. Patient febrile on POD #1, pan-cultured. TTE performed on 11, found to have calcified AV with mobile echodensity, cannot r/o vegetation, cardiology consulted. Blood cultures have had NGTD. Patient in rapid AF on 11/13, started on cardizem. Patient extubated on 11/13.           Vital Signs Last 24 Hrs  T(C): 37.4 (14 Nov 2023 07:00), Max: 38.1 (13 Nov 2023 19:00)  T(F): 99.3 (14 Nov 2023 07:00), Max: 100.6 (13 Nov 2023 19:00)  HR: 95 (14 Nov 2023 07:00) (65 - 99)  BP: 160/76 (14 Nov 2023 07:00) (105/74 - 160/76)  BP(mean): 99 (14 Nov 2023 07:00) (75 - 105)  RR: 21 (14 Nov 2023 07:00) (15 - 24)  SpO2: 95% (14 Nov 2023 07:00) (91% - 100%)    Parameters below as of 13 Nov 2023 18:00  Patient On (Oxygen Delivery Method): room air      I&O's Summary    13 Nov 2023 07:01  -  14 Nov 2023 07:00  --------------------------------------------------------  IN: 1683.4 mL / OUT: 1255 mL / NET: 428.4 mL        MEDICATIONS  (STANDING):  chlorhexidine 2% Cloths 1 Application(s) Topical <User Schedule>  dexMEDEtomidine Infusion 0.2 MICROgram(s)/kG/Hr (4 mL/Hr) IV Continuous <Continuous>  diltiazem    Tablet 30 milliGRAM(s) Oral every 6 hours  diltiazem Injectable 20 milliGRAM(s) IV Push once  diphtheria/tetanus/pertussis (acellular) Vaccine (Adacel) 0.5 milliLiter(s) IntraMuscular once  levETIRAcetam  IVPB 500 milliGRAM(s) IV Intermittent every 12 hours  polyethylene glycol 3350 17 Gram(s) Oral daily  senna 2 Tablet(s) Oral at bedtime  sodium bicarbonate 650 milliGRAM(s) Oral three times a day    MEDICATIONS  (PRN):  hydrALAZINE Injectable 10 milliGRAM(s) IV Push every 2 hours PRN SBP>160  labetalol Injectable 10 milliGRAM(s) IV Push every 2 hours PRN SBP>160  oxyCODONE    IR 5 milliGRAM(s) Oral every 4 hours PRN Moderate Pain (4 - 6)  oxyCODONE    IR 10 milliGRAM(s) Oral every 4 hours PRN Severe Pain (7 - 10)        LABS                                            8.0                   Neurophils% (auto):   x      (11-14 @ 04:46):    11.57)-----------(176          Lymphocytes% (auto):  x                                             24.1                   Eosinphils% (auto):   x        Manual%: Neutrophils x    ; Lymphocytes x    ; Eosinophils x    ; Bands%: x    ; Blasts x                                    151    |  119    |  55.8                Calcium: 8.6   / iCa: x      (11-14 @ 04:46)    ----------------------------<  125       Magnesium: 2.6                              4.2     |  18.0   |  2.21             Phosphorous: 3.1          RADIOLOGY:  < from: CT Head No Cont (11.12.23 @ 09:08) >    Status post evacuation of a right-sided intraparenchymal   hemorrhage. Multicompartment intracranial hemorrhages improved to stable.   Status post right-sided subdural drainage catheter removal. New small   hypodense subdural collection at the craniectomy site.    < end of copied text >      < from: US Duplex Venous Upper Ext Complete, Bilateral (11.13.23 @ 19:20) >    No evidence of deep venous thrombosis in either upper extremity.    Superficial thrombus noted in the left cephalic vein.    < end of copied text >      < from: MR Head No Cont (11.13.23 @ 21:36) >    RIGHT frontoparietal craniectomy with underlying   intracranial hemorrhage and edema within the RIGHT frontal lobe.   Overlying small RIGHT subdural hemorrhage is also unchanged. Scant   hemorrhage in the dependent portions of the BILATERAL lateral ventricles   and minimal subarachnoid hemorrhage seen in the BILATERAL frontal and   parietal lobes. Moderate periventricular and deep white matter ischemia   is noted. Encephalomalacia and gliosis seen in the anterior frontal lobes   bilaterally.Tiny acute lacunar infarction in the LEFT cerebellum and   tiny acute cortical infarction in the RIGHT parietal lobe. Restricted   diffusion also noted about the surgical cavity suggesting infarcted   parenchyma.    < end of copied text >        ASSESSMENT & PLAN:   Chief complaint:   Patient is a 82y M, who presented on 11/10 s/p unwitnessed fall with head strike, found to have Rt frontal IPH, SDH, SAH, s/p right decompressive craniectomy with Dr. Larios. Course c/b rapid AFib, nonoliguric DONNIE, and AV vegetation? on TTE.     NEURO  #Traumatic ICH, iso AC and DAPT   #Alzheimer's Dementia    - Continue Q4 Neurochecks  - SBP goal <160  - HOLD AC/APA  - Seizure Ppx with Keppra for 7 days  - Resumed home [place]     CV  #H/o CAD, Afib  - SBP Goal: <160  - CAD: resume ASA when NSGY safe  - F/u lipid panel, CPK, LFTs, restart statin tomorrow if stable   - TTE: Possible vegetation?, cardiology following for possible WHIT, recommending ID consult   - BCx without growth in 48h   - Continue Cardizem 30 Q6     PULM  - Extubated 11/13  - SpO2 > 92% on RA  - Sputum Cx with Klebsiella on 11/11, CTM WBC and secretions off abx     GI  - TF via OGT  - F/u swallow evaluation   - Bowel Regimen with senna/Miralax, LBM 11/13    ID  #Fevers  - Possibly Central in nature  - Tmax 11/11 101.8, WBC now downtrending  - UA 11/11 negative   - CXR - No consolidations  - Sputum Cx - Klebsiella  - BCx - NGTD  - No abx at this time, if Tmax >101.3 or hemodynamic instability will start abx for Klebsiella Tracheobronchitis    RENAL  #H/o Lt RCC s/p nephrectomy  #DONNIE  - Baseline SCr <1, peaked at 4, now down to 2.21  - Likely ATN from ROXANNE  - Continue oral sodium bicarb  - Monitor, avoid nephrotoxic meds  - Appreciate Renal recommendations    #Hyponatremia, resolved   - 2% NaCl held, Na over-corrected to 151    ENDO  - No active issues  - Goal Glucose <180   - F/u HbA1c     HEME  #Thrombocytopenia  - Plt gilberto at 86, now 176  - Received 1u Plt on 11/14  - Will CTM, goal Plt >100    #VTE Ppx: SCDs and SQH        DISPO: Downgrade to medicine (tele)             For Followup:  [ ] F/u lipid panel, CPK, LFTs, restart statin tomorrow if stable   [ ] F/u NSx recommendations, restart ASA and AC when able   [ ] F/u cardiology recommendations regarding WHIT, ID not c/s at this time iso negative BCx   [ ] Swallow evaluation   [ ] CTM BMP, renal function and sodium improving   [ ] CTM WBC, fever curve, and respiratory secretions, off abx for now   [ ] CTM Plts, s/p 1u Plts on 11/13  [ ] Follow nephrology, cardiology, neurosurgery recommendations Neuro ICU Transfer Note    Transfer to: Stepdown       HPI:  82y M with PMH HTN, CAD s/p stents, renal cell carcinoma status post left nephrectomy, bladder CA, BPH, CHF, LBBB, vertigo,  HLD, 5.5cm AAA without rupture post EVAR, paroxysmal A-fib on Eliquis, Plavix, and aspirin, early stage Alzheimer dementia transferred from [facility] s/p unwitnessed fall with head strike at home found by wife on floor at 8am. Patient brought to urgent care by wife and had 5 staples to posterior scalp but was instructed to go to nearest ED.  CT head at [place] showed R frontal IPH, SDH, SAH at 9:00. CTA stroke protocol not performed at [facility]. Patient BIB on 6L NC.  Pt GCS 15 on arrival, A&Ox2 on arrival. After initial assessment, patient noted to be vomiting enroute to CT scanner.    (10 Nov 2023 11:04)    Neuro ICU COURSE:  Patient trauma B upon arrival to [facility], OR for right decompressive craniectomy with Dr. Lraios, brought to NeuroICU intubated and hemodynamically stable. Nephrology consulted 11/11 for non-oliguric DONNIE, now improving. Patient febrile on POD #1, pan-cultured. TTE performed on 11, found to have calcified AV with mobile echodensity, cannot r/o vegetation, cardiology consulted. Blood cultures have had NGTD. Patient in rapid AF on 11/13, started on cardizem. Patient extubated on 11/13.           Vital Signs Last 24 Hrs  T(C): 37.4 (14 Nov 2023 07:00), Max: 38.1 (13 Nov 2023 19:00)  T(F): 99.3 (14 Nov 2023 07:00), Max: 100.6 (13 Nov 2023 19:00)  HR: 95 (14 Nov 2023 07:00) (65 - 99)  BP: 160/76 (14 Nov 2023 07:00) (105/74 - 160/76)  BP(mean): 99 (14 Nov 2023 07:00) (75 - 105)  RR: 21 (14 Nov 2023 07:00) (15 - 24)  SpO2: 95% (14 Nov 2023 07:00) (91% - 100%)    Parameters below as of 13 Nov 2023 18:00  Patient On (Oxygen Delivery Method): room air      I&O's Summary    13 Nov 2023 07:01  -  14 Nov 2023 07:00  --------------------------------------------------------  IN: 1683.4 mL / OUT: 1255 mL / NET: 428.4 mL        MEDICATIONS  (STANDING):  chlorhexidine 2% Cloths 1 Application(s) Topical <User Schedule>  dexMEDEtomidine Infusion 0.2 MICROgram(s)/kG/Hr (4 mL/Hr) IV Continuous <Continuous>  diltiazem    Tablet 30 milliGRAM(s) Oral every 6 hours  diltiazem Injectable 20 milliGRAM(s) IV Push once  diphtheria/tetanus/pertussis (acellular) Vaccine (Adacel) 0.5 milliLiter(s) IntraMuscular once  levETIRAcetam  IVPB 500 milliGRAM(s) IV Intermittent every 12 hours  polyethylene glycol 3350 17 Gram(s) Oral daily  senna 2 Tablet(s) Oral at bedtime  sodium bicarbonate 650 milliGRAM(s) Oral three times a day    MEDICATIONS  (PRN):  hydrALAZINE Injectable 10 milliGRAM(s) IV Push every 2 hours PRN SBP>160  labetalol Injectable 10 milliGRAM(s) IV Push every 2 hours PRN SBP>160  oxyCODONE    IR 5 milliGRAM(s) Oral every 4 hours PRN Moderate Pain (4 - 6)  oxyCODONE    IR 10 milliGRAM(s) Oral every 4 hours PRN Severe Pain (7 - 10)        LABS                                            8.0                   Neurophils% (auto):   x      (11-14 @ 04:46):    11.57)-----------(176          Lymphocytes% (auto):  x                                             24.1                   Eosinphils% (auto):   x        Manual%: Neutrophils x    ; Lymphocytes x    ; Eosinophils x    ; Bands%: x    ; Blasts x                                    151    |  119    |  55.8                Calcium: 8.6   / iCa: x      (11-14 @ 04:46)    ----------------------------<  125       Magnesium: 2.6                              4.2     |  18.0   |  2.21             Phosphorous: 3.1          RADIOLOGY:  < from: CT Head No Cont (11.12.23 @ 09:08) >    Status post evacuation of a right-sided intraparenchymal   hemorrhage. Multicompartment intracranial hemorrhages improved to stable.   Status post right-sided subdural drainage catheter removal. New small   hypodense subdural collection at the craniectomy site.    < end of copied text >      < from: US Duplex Venous Upper Ext Complete, Bilateral (11.13.23 @ 19:20) >    No evidence of deep venous thrombosis in either upper extremity.    Superficial thrombus noted in the left cephalic vein.    < end of copied text >      < from: MR Head No Cont (11.13.23 @ 21:36) >    RIGHT frontoparietal craniectomy with underlying   intracranial hemorrhage and edema within the RIGHT frontal lobe.   Overlying small RIGHT subdural hemorrhage is also unchanged. Scant   hemorrhage in the dependent portions of the BILATERAL lateral ventricles   and minimal subarachnoid hemorrhage seen in the BILATERAL frontal and   parietal lobes. Moderate periventricular and deep white matter ischemia   is noted. Encephalomalacia and gliosis seen in the anterior frontal lobes   bilaterally.Tiny acute lacunar infarction in the LEFT cerebellum and   tiny acute cortical infarction in the RIGHT parietal lobe. Restricted   diffusion also noted about the surgical cavity suggesting infarcted   parenchyma.    < end of copied text >        ASSESSMENT & PLAN:   Chief complaint:   Patient is a 82y M, who presented on 11/10 s/p unwitnessed fall with head strike, found to have Rt frontal IPH, SDH, SAH, s/p right decompressive craniectomy with Dr. Larios. Course c/b rapid AFib, nonoliguric DONNIE, and AV vegetation? on TTE.     NEURO  #Traumatic ICH, iso AC and DAPT   #Alzheimer's Dementia    - Continue Q4 Neurochecks  - SBP goal <160  - HOLD AC/APA  - Seizure ppx with Keppra for 7 days  - Resumed home [place]     CV  #H/o CAD, Afib  - SBP Goal: <160  - CAD: resume ASA when safe per NSGY   - F/u lipid panel, CPK, LFTs, restart statin tomorrow if stable   - TTE: Possible vegetation?, cardiology following for possible WHIT, f/u recommendations   - BCx without growth in 48h   - Continue Cardizem 30 Q6     PULM  - Extubated 11/13  - SpO2 > 92% on RA  - Sputum Cx with Klebsiella on 11/11, CTM WBC and secretions off abx     GI  - TF via OGT  - Failed swallow eval 11/14   - Bowel Regimen with senna/Miralax, LBM 11/13    ID  #Fevers  - Possibly Central in nature  - Tmax 11/11 101.8, WBC now downtrending  - UA 11/11 negative   - CXR - No consolidations  - Sputum Cx - Klebsiella  - BCx - NGTD  - No abx at this time, if Tmax >101.3 or hemodynamic instability will start abx for Klebsiella Tracheobronchitis    RENAL  #H/o Lt RCC s/p nephrectomy  #DONNIE  - Baseline SCr <1, peaked at 4, now down to 2.21  - Likely ATN from ROXANNE  - Continue oral sodium bicarb  - Monitor, avoid nephrotoxic meds  - Appreciate Renal recommendations  - D/c'd hall on 11/14, f/u TOV    #Hyponatremia, resolved   - 2% NaCl held, Na over-corrected to 151  - F/u repeat BMP    ENDO  - No active issues  - Goal Glucose <180   - F/u HbA1c     HEME  #Thrombocytopenia  - Plt gilberto at 86, now 176  - Received 1u Plt on 11/14  - Will CTM, goal Plt >100    #VTE Ppx: SCDs   - Per NSx, OK to start chemical DVT ppx  - Avoid SQH / Lovenox d/t h/o HIT  - Consider starting Fondaparinux if kidney function improves        DISPO: Downgrade to medicine (tele)             For Followup:  [ ] F/u lipid panel, CPK, LFTs, restart statin tomorrow if stable   [ ] F/u NSx recommendations, restart ASA and AC when able   [ ] F/u cardiology recommendations regarding WHIT, ID not c/s at this time iso negative BCx   [ ] Swallow evaluation   [ ] CTM BMP, renal function and sodium improving   [ ] F/u TOV   [ ] CTM WBC, fever curve, and respiratory secretions, off abx for now   [ ] CTM Plts, s/p 1u Plts on 11/13  [ ] Consider starting Fondaparinux if kidney function improves  [ ] Follow nephrology, cardiology, neurosurgery recommendations Neuro ICU Transfer Note    Transfer to: Stepdown       HPI:  82y M with PMH HTN, CAD s/p stents, renal cell carcinoma status post left nephrectomy, bladder CA, BPH, CHF, LBBB, vertigo,  HLD, 5.5cm AAA without rupture post EVAR, paroxysmal A-fib on Eliquis, Plavix, and aspirin, early stage Alzheimer dementia transferred from [facility] s/p unwitnessed fall with head strike at home found by wife on floor at 8am. Patient brought to urgent care by wife and had 5 staples to posterior scalp but was instructed to go to nearest ED.  CT head at [place] showed R frontal IPH, SDH, SAH at 9:00. CTA stroke protocol not performed at [facility]. Patient BIB on 6L NC.  Pt GCS 15 on arrival, A&Ox2 on arrival. After initial assessment, patient noted to be vomiting enroute to CT scanner.    (10 Nov 2023 11:04)    Neuro ICU COURSE:  Patient trauma B upon arrival to [facility], OR for right decompressive craniectomy with Dr. Larios, brought to NeuroICU intubated and hemodynamically stable. Nephrology consulted 11/11 for non-oliguric DONNIE, now improving. Patient febrile on POD #1, pan-cultured. TTE performed on 11, found to have calcified AV with mobile echodensity, cannot r/o vegetation, cardiology consulted. Blood cultures have had NGTD. Patient in rapid AF on 11/13, started on cardizem. Patient extubated on 11/13.           Vital Signs Last 24 Hrs  T(C): 37.4 (14 Nov 2023 07:00), Max: 38.1 (13 Nov 2023 19:00)  T(F): 99.3 (14 Nov 2023 07:00), Max: 100.6 (13 Nov 2023 19:00)  HR: 95 (14 Nov 2023 07:00) (65 - 99)  BP: 160/76 (14 Nov 2023 07:00) (105/74 - 160/76)  BP(mean): 99 (14 Nov 2023 07:00) (75 - 105)  RR: 21 (14 Nov 2023 07:00) (15 - 24)  SpO2: 95% (14 Nov 2023 07:00) (91% - 100%)    Parameters below as of 13 Nov 2023 18:00  Patient On (Oxygen Delivery Method): room air      I&O's Summary    13 Nov 2023 07:01  -  14 Nov 2023 07:00  --------------------------------------------------------  IN: 1683.4 mL / OUT: 1255 mL / NET: 428.4 mL        MEDICATIONS  (STANDING):  chlorhexidine 2% Cloths 1 Application(s) Topical <User Schedule>  dexMEDEtomidine Infusion 0.2 MICROgram(s)/kG/Hr (4 mL/Hr) IV Continuous <Continuous>  diltiazem    Tablet 30 milliGRAM(s) Oral every 6 hours  diltiazem Injectable 20 milliGRAM(s) IV Push once  diphtheria/tetanus/pertussis (acellular) Vaccine (Adacel) 0.5 milliLiter(s) IntraMuscular once  levETIRAcetam  IVPB 500 milliGRAM(s) IV Intermittent every 12 hours  polyethylene glycol 3350 17 Gram(s) Oral daily  senna 2 Tablet(s) Oral at bedtime  sodium bicarbonate 650 milliGRAM(s) Oral three times a day    MEDICATIONS  (PRN):  hydrALAZINE Injectable 10 milliGRAM(s) IV Push every 2 hours PRN SBP>160  labetalol Injectable 10 milliGRAM(s) IV Push every 2 hours PRN SBP>160  oxyCODONE    IR 5 milliGRAM(s) Oral every 4 hours PRN Moderate Pain (4 - 6)  oxyCODONE    IR 10 milliGRAM(s) Oral every 4 hours PRN Severe Pain (7 - 10)        LABS                                            8.0                   Neurophils% (auto):   x      (11-14 @ 04:46):    11.57)-----------(176          Lymphocytes% (auto):  x                                             24.1                   Eosinphils% (auto):   x        Manual%: Neutrophils x    ; Lymphocytes x    ; Eosinophils x    ; Bands%: x    ; Blasts x                                    151    |  119    |  55.8                Calcium: 8.6   / iCa: x      (11-14 @ 04:46)    ----------------------------<  125       Magnesium: 2.6                              4.2     |  18.0   |  2.21             Phosphorous: 3.1          RADIOLOGY:  < from: CT Head No Cont (11.12.23 @ 09:08) >    Status post evacuation of a right-sided intraparenchymal   hemorrhage. Multicompartment intracranial hemorrhages improved to stable.   Status post right-sided subdural drainage catheter removal. New small   hypodense subdural collection at the craniectomy site.    < end of copied text >      < from: US Duplex Venous Upper Ext Complete, Bilateral (11.13.23 @ 19:20) >    No evidence of deep venous thrombosis in either upper extremity.    Superficial thrombus noted in the left cephalic vein.    < end of copied text >      < from: MR Head No Cont (11.13.23 @ 21:36) >    RIGHT frontoparietal craniectomy with underlying   intracranial hemorrhage and edema within the RIGHT frontal lobe.   Overlying small RIGHT subdural hemorrhage is also unchanged. Scant   hemorrhage in the dependent portions of the BILATERAL lateral ventricles   and minimal subarachnoid hemorrhage seen in the BILATERAL frontal and   parietal lobes. Moderate periventricular and deep white matter ischemia   is noted. Encephalomalacia and gliosis seen in the anterior frontal lobes   bilaterally.Tiny acute lacunar infarction in the LEFT cerebellum and   tiny acute cortical infarction in the RIGHT parietal lobe. Restricted   diffusion also noted about the surgical cavity suggesting infarcted   parenchyma.    < end of copied text >        ASSESSMENT & PLAN:   Chief complaint:   Patient is a 82y M, who presented on 11/10 s/p unwitnessed fall with head strike, found to have Rt frontal IPH, SDH, SAH, s/p right decompressive craniectomy with Dr. Larios. Course c/b rapid AFib, nonoliguric DONNIE, and AV vegetation? on TTE.     NEURO  #Traumatic ICH, iso AC and DAPT   #Alzheimer's Dementia    - Continue Q4 Neurochecks  - SBP goal <160  - HOLD AC/APA  - Seizure ppx with Keppra for 7 days  - Resumed home [place]   - Needs helmet when OOB, orthotist c/s     CV  #H/o CAD, Afib  - SBP Goal: <160  - CAD: resume ASA when safe per NSGY   - F/u lipid panel, CPK, LFTs, restart statin tomorrow if stable   - TTE: Possible vegetation?, cardiology following for possible WHIT, f/u recommendations   - BCx without growth in 48h   - Continue Cardizem 30 Q6     PULM  - Extubated 11/13  - SpO2 > 92% on RA  - Sputum Cx with Klebsiella on 11/11, CTM WBC and secretions off abx     GI  - TF via OGT  - Failed swallow eval 11/14   - Bowel Regimen with senna/Miralax, LBM 11/13    ID  #Fevers  - Possibly Central in nature  - Tmax 11/11 101.8, WBC now downtrending  - UA 11/11 negative   - CXR - No consolidations  - Sputum Cx - Klebsiella  - BCx - NGTD  - No abx at this time, if Tmax >101.3 or hemodynamic instability will start abx for Klebsiella Tracheobronchitis    RENAL  #H/o Lt RCC s/p nephrectomy  #DONNIE  - Baseline SCr <1, peaked at 4, now down to 2.21  - Likely ATN from ROXANNE  - Continue oral sodium bicarb  - Monitor, avoid nephrotoxic meds  - Appreciate Renal recommendations  - D/c'd hall on 11/14, f/u TOV    #Hyponatremia, resolved   - 2% NaCl held, Na over-corrected to 151  - F/u repeat BMP    ENDO  - No active issues  - Goal Glucose <180   - F/u HbA1c     HEME  #Thrombocytopenia  - Plt gilberto at 86, now 176  - Received 1u Plt on 11/14  - Will CTM, goal Plt >100    #VTE Ppx: SCDs   - Per NSx, OK to start chemical DVT ppx  - Avoid SQH / Lovenox d/t h/o HIT  - Consider starting Fondaparinux if kidney function improves        DISPO: Downgrade to medicine (tele)             For Followup:  [ ] F/u lipid panel, CPK, LFTs, restart statin tomorrow if stable   [ ] F/u NSx recommendations, restart ASA and AC when able   [ ] Helmet when OOB  [ ] F/u cardiology recommendations regarding WHIT, ID not c/s at this time iso negative BCx   [ ] Swallow evaluation   [ ] CTM BMP, renal function and sodium improving   [ ] F/u TOV   [ ] CTM WBC, fever curve, and respiratory secretions, off abx for now   [ ] CTM Plts, s/p 1u Plts on 11/13  [ ] Consider starting Fondaparinux if kidney function improves  [ ] Follow nephrology, cardiology, neurosurgery recommendations Neuro ICU Transfer Note    Transfer to: Stepdown       HPI:  82y M with PMH HTN, CAD s/p stents, renal cell carcinoma status post left nephrectomy, bladder CA, BPH, CHF, LBBB, vertigo,  HLD, 5.5cm AAA without rupture post EVAR, paroxysmal A-fib on Eliquis, Plavix, and aspirin, early stage Alzheimer dementia transferred from [facility] s/p unwitnessed fall with head strike at home found by wife on floor at 8am. Patient brought to urgent care by wife and had 5 staples to posterior scalp but was instructed to go to nearest ED.  CT head at [place] showed R frontal IPH, SDH, SAH at 9:00. CTA stroke protocol not performed at [facility]. Patient BIB on 6L NC.  Pt GCS 15 on arrival, A&Ox2 on arrival. After initial assessment, patient noted to be vomiting enroute to CT scanner.    (10 Nov 2023 11:04)    Neuro ICU COURSE:  Patient trauma B upon arrival to [facility], OR for right decompressive craniectomy with Dr. Larios, brought to NeuroICU intubated and hemodynamically stable. Nephrology consulted 11/11 for non-oliguric DONNIE, now improving. Patient febrile on POD #1, pan-cultured. TTE performed on 11, found to have calcified AV with mobile echodensity, cannot r/o vegetation, cardiology consulted. Blood cultures have had NGTD. Patient in rapid AF on 11/13, started on cardizem. Patient extubated on 11/13.           Vital Signs Last 24 Hrs  T(C): 37.4 (14 Nov 2023 07:00), Max: 38.1 (13 Nov 2023 19:00)  T(F): 99.3 (14 Nov 2023 07:00), Max: 100.6 (13 Nov 2023 19:00)  HR: 95 (14 Nov 2023 07:00) (65 - 99)  BP: 160/76 (14 Nov 2023 07:00) (105/74 - 160/76)  BP(mean): 99 (14 Nov 2023 07:00) (75 - 105)  RR: 21 (14 Nov 2023 07:00) (15 - 24)  SpO2: 95% (14 Nov 2023 07:00) (91% - 100%)    Parameters below as of 13 Nov 2023 18:00  Patient On (Oxygen Delivery Method): room air      I&O's Summary    13 Nov 2023 07:01  -  14 Nov 2023 07:00  --------------------------------------------------------  IN: 1683.4 mL / OUT: 1255 mL / NET: 428.4 mL        MEDICATIONS  (STANDING):  chlorhexidine 2% Cloths 1 Application(s) Topical <User Schedule>  dexMEDEtomidine Infusion 0.2 MICROgram(s)/kG/Hr (4 mL/Hr) IV Continuous <Continuous>  diltiazem    Tablet 30 milliGRAM(s) Oral every 6 hours  diltiazem Injectable 20 milliGRAM(s) IV Push once  diphtheria/tetanus/pertussis (acellular) Vaccine (Adacel) 0.5 milliLiter(s) IntraMuscular once  levETIRAcetam  IVPB 500 milliGRAM(s) IV Intermittent every 12 hours  polyethylene glycol 3350 17 Gram(s) Oral daily  senna 2 Tablet(s) Oral at bedtime  sodium bicarbonate 650 milliGRAM(s) Oral three times a day    MEDICATIONS  (PRN):  hydrALAZINE Injectable 10 milliGRAM(s) IV Push every 2 hours PRN SBP>160  labetalol Injectable 10 milliGRAM(s) IV Push every 2 hours PRN SBP>160  oxyCODONE    IR 5 milliGRAM(s) Oral every 4 hours PRN Moderate Pain (4 - 6)  oxyCODONE    IR 10 milliGRAM(s) Oral every 4 hours PRN Severe Pain (7 - 10)        LABS                                            8.0                   Neurophils% (auto):   x      (11-14 @ 04:46):    11.57)-----------(176          Lymphocytes% (auto):  x                                             24.1                   Eosinphils% (auto):   x        Manual%: Neutrophils x    ; Lymphocytes x    ; Eosinophils x    ; Bands%: x    ; Blasts x                                    151    |  119    |  55.8                Calcium: 8.6   / iCa: x      (11-14 @ 04:46)    ----------------------------<  125       Magnesium: 2.6                              4.2     |  18.0   |  2.21             Phosphorous: 3.1          RADIOLOGY:  < from: CT Head No Cont (11.12.23 @ 09:08) >    Status post evacuation of a right-sided intraparenchymal   hemorrhage. Multicompartment intracranial hemorrhages improved to stable.   Status post right-sided subdural drainage catheter removal. New small   hypodense subdural collection at the craniectomy site.    < end of copied text >      < from: US Duplex Venous Upper Ext Complete, Bilateral (11.13.23 @ 19:20) >    No evidence of deep venous thrombosis in either upper extremity.    Superficial thrombus noted in the left cephalic vein.    < end of copied text >      < from: MR Head No Cont (11.13.23 @ 21:36) >    RIGHT frontoparietal craniectomy with underlying   intracranial hemorrhage and edema within the RIGHT frontal lobe.   Overlying small RIGHT subdural hemorrhage is also unchanged. Scant   hemorrhage in the dependent portions of the BILATERAL lateral ventricles   and minimal subarachnoid hemorrhage seen in the BILATERAL frontal and   parietal lobes. Moderate periventricular and deep white matter ischemia   is noted. Encephalomalacia and gliosis seen in the anterior frontal lobes   bilaterally.Tiny acute lacunar infarction in the LEFT cerebellum and   tiny acute cortical infarction in the RIGHT parietal lobe. Restricted   diffusion also noted about the surgical cavity suggesting infarcted   parenchyma.    < end of copied text >        ASSESSMENT & PLAN:   Chief complaint:   Patient is a 82y M, who presented on 11/10 s/p unwitnessed fall with head strike, found to have Rt frontal IPH, SDH, SAH, s/p right decompressive craniectomy with Dr. Larios. Course c/b rapid AFib, nonoliguric DONNIE, and AV vegetation? on TTE.     NEURO  #Traumatic ICH, iso AC and DAPT   #Alzheimer's Dementia    - Continue Q4 Neurochecks  - SBP goal <160  - HOLD AC/APA  - Seizure ppx with Keppra for 7 days  - Resumed home [place]   - Needs helmet when OOB, orthotist c/s     CV  #H/o CAD, Afib  - SBP Goal: <160  - CAD: resume ASA when safe per NSGY   - F/u lipid panel, CPK, LFTs, restart statin tomorrow if stable   - TTE: Possible vegetation?, cardiology following for possible WIHT, f/u recommendations   - BCx without growth in 48h   - Continue Cardizem 30 Q6     PULM  - Extubated 11/13  - SpO2 > 92% on RA  - Sputum Cx with Klebsiella on 11/11, CTM WBC and secretions off abx   - Mucomyst, Duonebs     GI  - TF via OGT  - Failed swallow eval 11/14   - Bowel Regimen with senna/Miralax, LBM 11/13    ID  #Fevers  - Possibly Central in nature  - Tmax 11/11 101.8, WBC now downtrending  - UA 11/11 negative   - CXR - No consolidations  - Sputum Cx - Klebsiella  - BCx - NGTD  - No abx at this time, if Tmax >101.3 or hemodynamic instability will start abx for Klebsiella Tracheobronchitis    RENAL  #H/o Lt RCC s/p nephrectomy  #DONNIE  - Baseline SCr <1, peaked at 4, now down to 2.21  - Likely ATN from ROXANNE  - Continue oral sodium bicarb  - Monitor, avoid nephrotoxic meds  - Appreciate Renal recommendations  - D/c'd hall on 11/14, f/u TOV    #Hyponatremia, resolved   - 2% NaCl held, Na over-corrected to 151  - F/u repeat BMP    ENDO  - No active issues  - Goal Glucose <180   - F/u HbA1c     HEME  #Thrombocytopenia  - Plt gilberto at 86, now 176  - Received 1u Plt on 11/14  - Will CTM, goal Plt >100    #VTE Ppx: SCDs   - Per NSx, OK to start chemical DVT ppx  - Avoid SQH / Lovenox d/t h/o HIT  - Consider starting Fondaparinux if kidney function improves        DISPO: Downgrade to medicine (tele)             For Followup:  [ ] F/u lipid panel, CPK, LFTs, restart statin tomorrow if stable   [ ] F/u NSx recommendations, restart ASA and AC when able   [ ] Helmet when OOB  [ ] F/u cardiology recommendations regarding WHIT, ID not c/s at this time iso negative BCx   [ ] Swallow evaluation   [ ] CTM BMP, renal function and sodium improving   [ ] F/u TOV   [ ] CTM WBC, fever curve, and respiratory secretions, off abx for now   [ ] CTM Plts, s/p 1u Plts on 11/13  [ ] Consider starting Fondaparinux if kidney function improves  [ ] Follow nephrology, cardiology, neurosurgery recommendations Neuro ICU Transfer Note    Transfer to: Stepdown       HPI:  82y M with PMH HTN, CAD s/p stents, renal cell carcinoma status post left nephrectomy, bladder CA, BPH, CHF, LBBB, vertigo,  HLD, 5.5cm AAA without rupture post EVAR, paroxysmal A-fib on Eliquis, Plavix, and aspirin, early stage Alzheimer dementia transferred from [facility] s/p unwitnessed fall with head strike at home found by wife on floor at 8am. Patient brought to urgent care by wife and had 5 staples to posterior scalp but was instructed to go to nearest ED.  CT head at [place] showed R frontal IPH, SDH, SAH at 9:00. CTA stroke protocol not performed at [facility]. Patient BIB on 6L NC.  Pt GCS 15 on arrival, A&Ox2 on arrival. After initial assessment, patient noted to be vomiting enroute to CT scanner.    (10 Nov 2023 11:04)    Neuro ICU COURSE:  Patient trauma B upon arrival to [facility], OR for right decompressive craniectomy with Dr. Larios, brought to NeuroICU intubated and hemodynamically stable. Nephrology consulted 11/11 for non-oliguric DONNIE, now improving. Patient febrile on POD #1, pan-cultured. TTE performed on 11, found to have calcified AV with mobile echodensity, cannot r/o vegetation, cardiology consulted. Blood cultures have had NGTD. Patient in rapid AF on 11/13, started on cardizem. Patient extubated on 11/13.           Vital Signs Last 24 Hrs  T(C): 37.4 (14 Nov 2023 07:00), Max: 38.1 (13 Nov 2023 19:00)  T(F): 99.3 (14 Nov 2023 07:00), Max: 100.6 (13 Nov 2023 19:00)  HR: 95 (14 Nov 2023 07:00) (65 - 99)  BP: 160/76 (14 Nov 2023 07:00) (105/74 - 160/76)  BP(mean): 99 (14 Nov 2023 07:00) (75 - 105)  RR: 21 (14 Nov 2023 07:00) (15 - 24)  SpO2: 95% (14 Nov 2023 07:00) (91% - 100%)    Parameters below as of 13 Nov 2023 18:00  Patient On (Oxygen Delivery Method): room air      I&O's Summary    13 Nov 2023 07:01  -  14 Nov 2023 07:00  --------------------------------------------------------  IN: 1683.4 mL / OUT: 1255 mL / NET: 428.4 mL        MEDICATIONS  (STANDING):  chlorhexidine 2% Cloths 1 Application(s) Topical <User Schedule>  dexMEDEtomidine Infusion 0.2 MICROgram(s)/kG/Hr (4 mL/Hr) IV Continuous <Continuous>  diltiazem    Tablet 30 milliGRAM(s) Oral every 6 hours  diltiazem Injectable 20 milliGRAM(s) IV Push once  diphtheria/tetanus/pertussis (acellular) Vaccine (Adacel) 0.5 milliLiter(s) IntraMuscular once  levETIRAcetam  IVPB 500 milliGRAM(s) IV Intermittent every 12 hours  polyethylene glycol 3350 17 Gram(s) Oral daily  senna 2 Tablet(s) Oral at bedtime  sodium bicarbonate 650 milliGRAM(s) Oral three times a day    MEDICATIONS  (PRN):  hydrALAZINE Injectable 10 milliGRAM(s) IV Push every 2 hours PRN SBP>160  labetalol Injectable 10 milliGRAM(s) IV Push every 2 hours PRN SBP>160  oxyCODONE    IR 5 milliGRAM(s) Oral every 4 hours PRN Moderate Pain (4 - 6)  oxyCODONE    IR 10 milliGRAM(s) Oral every 4 hours PRN Severe Pain (7 - 10)        LABS                                            8.0                   Neurophils% (auto):   x      (11-14 @ 04:46):    11.57)-----------(176          Lymphocytes% (auto):  x                                             24.1                   Eosinphils% (auto):   x        Manual%: Neutrophils x    ; Lymphocytes x    ; Eosinophils x    ; Bands%: x    ; Blasts x                                    151    |  119    |  55.8                Calcium: 8.6   / iCa: x      (11-14 @ 04:46)    ----------------------------<  125       Magnesium: 2.6                              4.2     |  18.0   |  2.21             Phosphorous: 3.1          RADIOLOGY:  < from: CT Head No Cont (11.12.23 @ 09:08) >    Status post evacuation of a right-sided intraparenchymal   hemorrhage. Multicompartment intracranial hemorrhages improved to stable.   Status post right-sided subdural drainage catheter removal. New small   hypodense subdural collection at the craniectomy site.    < end of copied text >      < from: US Duplex Venous Upper Ext Complete, Bilateral (11.13.23 @ 19:20) >    No evidence of deep venous thrombosis in either upper extremity.    Superficial thrombus noted in the left cephalic vein.    < end of copied text >      < from: MR Head No Cont (11.13.23 @ 21:36) >    RIGHT frontoparietal craniectomy with underlying   intracranial hemorrhage and edema within the RIGHT frontal lobe.   Overlying small RIGHT subdural hemorrhage is also unchanged. Scant   hemorrhage in the dependent portions of the BILATERAL lateral ventricles   and minimal subarachnoid hemorrhage seen in the BILATERAL frontal and   parietal lobes. Moderate periventricular and deep white matter ischemia   is noted. Encephalomalacia and gliosis seen in the anterior frontal lobes   bilaterally.Tiny acute lacunar infarction in the LEFT cerebellum and   tiny acute cortical infarction in the RIGHT parietal lobe. Restricted   diffusion also noted about the surgical cavity suggesting infarcted   parenchyma.    < end of copied text >        ASSESSMENT & PLAN:   Chief complaint:   Patient is a 82y M, who presented on 11/10 s/p unwitnessed fall with head strike, found to have Rt frontal IPH, SDH, SAH, s/p right decompressive craniectomy with Dr. Larios. Course c/b rapid AFib, nonoliguric DONNIE, and AV vegetation? on TTE.     NEURO  #Traumatic ICH, iso AC and DAPT   #Alzheimer's Dementia    - Continue Q4 Neurochecks  - SBP goal <160  - HOLD AC/APA  - Seizure ppx with Keppra for 7 days  - Resumed home [place]   - Needs helmet when OOB, orthotist c/s     CV  #H/o CAD, Afib  - SBP Goal: <160  - CAD: resume ASA when safe per NSGY   - F/u lipid panel, CPK, LFTs, restart statin tomorrow if stable   - TTE: Possible vegetation?, cardiology following for possible WHIT, f/u recommendations   - BCx without growth in 48h   - Continue Cardizem 30 Q6     PULM  - Extubated 11/13  - SpO2 > 92% on RA  - Sputum Cx with Klebsiella on 11/11, CTM WBC and secretions off abx   - Mucomyst, Duonebs     GI  - TF via OGT  - Failed swallow eval 11/14   - Bowel Regimen with senna/Miralax, LBM 11/13    ID  #Fevers  - Possibly Central in nature  - Tmax 11/11 101.8, WBC now downtrending  - UA 11/11 negative   - CXR - No consolidations  - Sputum Cx - Klebsiella  - BCx - NGTD  - No abx at this time, if Tmax >101.3 or hemodynamic instability will start abx for Klebsiella Tracheobronchitis    RENAL  #H/o Lt RCC s/p nephrectomy  #DONNIE  - Baseline SCr <1, peaked at 4, now down to 2.21  - Likely ATN from ROXANNE  - Continue oral sodium bicarb  - Monitor, avoid nephrotoxic meds  - Appreciate Renal recommendations  - D/c'd hall on 11/14, f/u TOV    #Hyponatremia, resolved   - 2% NaCl held, Na over-corrected to 151  - F/u repeat BMP    ENDO  - No active issues  - Goal Glucose <180   - F/u HbA1c     HEME  #Thrombocytopenia  - Plt gilberto at 86, now 176  - Received 1u Plt on 11/14  - Will CTM, goal Plt >100    #VTE Ppx: SCDs   - Per NSx, OK to start chemical DVT ppx  - Avoid SQH / Lovenox d/t h/o HIT  - Consider starting Fondaparinux if kidney function improves     ETHICS:   - Darci Jennifer and Micaela are HCP      DISPO: Downgrade to stepdown              For Followup:  [ ] F/u lipid panel, CPK, LFTs, restart statin tomorrow if stable   [ ] F/u NSx recommendations, restart ASA and AC when able   [ ] Helmet when OOB  [ ] F/u cardiology recommendations regarding WHIT, ID not c/s at this time iso negative BCx   [ ] Swallow evaluation   [ ] CTM BMP, renal function and sodium improving   [ ] F/u TOV   [ ] CTM WBC, fever curve, and respiratory secretions, off abx for now   [ ] CTM Plts, s/p 1u Plts on 11/13  [ ] Consider starting Fondaparinux if kidney function improves  [ ] Follow nephrology, cardiology, neurosurgery recommendations Neuro ICU Transfer Note    Transfer to: Stepdown       HPI:  82y M with PMH HTN, CAD s/p stents, renal cell carcinoma status post left nephrectomy, bladder CA, BPH, CHF, LBBB, vertigo,  HLD, 5.5cm AAA without rupture post EVAR, paroxysmal A-fib on Eliquis, Plavix, and aspirin, early stage Alzheimer dementia transferred from [facility] s/p unwitnessed fall with head strike at home found by wife on floor at 8am. Patient brought to urgent care by wife and had 5 staples to posterior scalp but was instructed to go to nearest ED.  CT head at [place] showed R frontal IPH, SDH, SAH at 9:00. CTA stroke protocol not performed at [facility]. Patient BIB on 6L NC.  Pt GCS 15 on arrival, A&Ox2 on arrival. After initial assessment, patient noted to be vomiting enroute to CT scanner.    (10 Nov 2023 11:04)    Neuro ICU COURSE:  Patient trauma B upon arrival to [facility], OR for right decompressive craniectomy with Dr. Larios, brought to NeuroICU intubated and hemodynamically stable. Nephrology consulted 11/11 for non-oliguric DONNIE, now improving. Patient febrile on POD #1, pan-cultured. TTE performed on 11, found to have calcified AV with mobile echodensity, cannot r/o vegetation, cardiology consulted. Blood cultures have had NGTD. Patient in rapid AF on 11/13, started on cardizem. Patient extubated on 11/13.           Vital Signs Last 24 Hrs  T(C): 37.4 (14 Nov 2023 07:00), Max: 38.1 (13 Nov 2023 19:00)  T(F): 99.3 (14 Nov 2023 07:00), Max: 100.6 (13 Nov 2023 19:00)  HR: 95 (14 Nov 2023 07:00) (65 - 99)  BP: 160/76 (14 Nov 2023 07:00) (105/74 - 160/76)  BP(mean): 99 (14 Nov 2023 07:00) (75 - 105)  RR: 21 (14 Nov 2023 07:00) (15 - 24)  SpO2: 95% (14 Nov 2023 07:00) (91% - 100%)    Parameters below as of 13 Nov 2023 18:00  Patient On (Oxygen Delivery Method): room air      I&O's Summary    13 Nov 2023 07:01  -  14 Nov 2023 07:00  --------------------------------------------------------  IN: 1683.4 mL / OUT: 1255 mL / NET: 428.4 mL        MEDICATIONS  (STANDING):  chlorhexidine 2% Cloths 1 Application(s) Topical <User Schedule>  dexMEDEtomidine Infusion 0.2 MICROgram(s)/kG/Hr (4 mL/Hr) IV Continuous <Continuous>  diltiazem    Tablet 30 milliGRAM(s) Oral every 6 hours  diltiazem Injectable 20 milliGRAM(s) IV Push once  diphtheria/tetanus/pertussis (acellular) Vaccine (Adacel) 0.5 milliLiter(s) IntraMuscular once  levETIRAcetam  IVPB 500 milliGRAM(s) IV Intermittent every 12 hours  polyethylene glycol 3350 17 Gram(s) Oral daily  senna 2 Tablet(s) Oral at bedtime  sodium bicarbonate 650 milliGRAM(s) Oral three times a day    MEDICATIONS  (PRN):  hydrALAZINE Injectable 10 milliGRAM(s) IV Push every 2 hours PRN SBP>160  labetalol Injectable 10 milliGRAM(s) IV Push every 2 hours PRN SBP>160  oxyCODONE    IR 5 milliGRAM(s) Oral every 4 hours PRN Moderate Pain (4 - 6)  oxyCODONE    IR 10 milliGRAM(s) Oral every 4 hours PRN Severe Pain (7 - 10)        LABS                                            8.0                   Neurophils% (auto):   x      (11-14 @ 04:46):    11.57)-----------(176          Lymphocytes% (auto):  x                                             24.1                   Eosinphils% (auto):   x        Manual%: Neutrophils x    ; Lymphocytes x    ; Eosinophils x    ; Bands%: x    ; Blasts x                                    151    |  119    |  55.8                Calcium: 8.6   / iCa: x      (11-14 @ 04:46)    ----------------------------<  125       Magnesium: 2.6                              4.2     |  18.0   |  2.21             Phosphorous: 3.1          RADIOLOGY:  < from: CT Head No Cont (11.12.23 @ 09:08) >    Status post evacuation of a right-sided intraparenchymal   hemorrhage. Multicompartment intracranial hemorrhages improved to stable.   Status post right-sided subdural drainage catheter removal. New small   hypodense subdural collection at the craniectomy site.    < end of copied text >      < from: US Duplex Venous Upper Ext Complete, Bilateral (11.13.23 @ 19:20) >    No evidence of deep venous thrombosis in either upper extremity.    Superficial thrombus noted in the left cephalic vein.    < end of copied text >      < from: MR Head No Cont (11.13.23 @ 21:36) >    RIGHT frontoparietal craniectomy with underlying   intracranial hemorrhage and edema within the RIGHT frontal lobe.   Overlying small RIGHT subdural hemorrhage is also unchanged. Scant   hemorrhage in the dependent portions of the BILATERAL lateral ventricles   and minimal subarachnoid hemorrhage seen in the BILATERAL frontal and   parietal lobes. Moderate periventricular and deep white matter ischemia   is noted. Encephalomalacia and gliosis seen in the anterior frontal lobes   bilaterally.Tiny acute lacunar infarction in the LEFT cerebellum and   tiny acute cortical infarction in the RIGHT parietal lobe. Restricted   diffusion also noted about the surgical cavity suggesting infarcted   parenchyma.    < end of copied text >        ASSESSMENT & PLAN:   Chief complaint:   Patient is a 82y M, who presented on 11/10 s/p unwitnessed fall with head strike, found to have Rt frontal IPH, SDH, SAH, s/p right decompressive craniectomy with Dr. Larios. Course c/b rapid AFib, nonoliguric DONNIE, and AV vegetation? on TTE.     NEURO  #Traumatic ICH, iso AC and DAPT   #Alzheimer's Dementia    - Continue Q2 Neurochecks  - SBP goal <160  - HOLD AC/APA  - Seizure ppx with Keppra for 7 days  - Resumed home Namenda   - Needs helmet when OOB, orthotist c/s     CV  #H/o CAD, Afib  - SBP Goal: <160  - CAD: resume ASA when safe per NSGY   - F/u lipid panel, CPK, LFTs, restart statin tomorrow if stable   - TTE: Possible vegetation?, cardiology following for possible WHIT, f/u recommendations   - BCx without growth in 48h   - Continue Cardizem 30 Q6     PULM  - Extubated 11/13  - SpO2 > 92% on RA  - Sputum Cx with Klebsiella on 11/11, CTM WBC and secretions off abx   - Mucomyst, Duonebs     GI  - TF via OGT  - Failed swallow eval 11/14   - Bowel Regimen with senna/Miralax, LBM 11/13    ID  #Fevers  - Possibly Central in nature  - Tmax 11/11 101.8, WBC now downtrending  - UA 11/11 negative   - CXR - No consolidations  - Sputum Cx - Klebsiella  - BCx - NGTD  - No abx at this time, if Tmax >101.3 or hemodynamic instability will start abx for Klebsiella Tracheobronchitis    RENAL  #H/o Lt RCC s/p nephrectomy  #DONNIE  - Baseline SCr <1, peaked at 4, now down to 2.21  - Likely ATN from ROXANNE  - Continue oral sodium bicarb  - Monitor, avoid nephrotoxic meds  - Appreciate Renal recommendations  - D/c'd hall on 11/14, f/u TOV    #Hyponatremia, resolved   - 2% NaCl held, Na over-corrected to 151  - F/u repeat BMP    ENDO  - No active issues  - Goal Glucose <180   - F/u HbA1c     HEME  #Thrombocytopenia  - Plt gilberto at 86, now 176  - Received 1u Plt on 11/14  - Will CTM, goal Plt >100    #VTE Ppx: SCDs   - Per NSx, OK to start chemical DVT ppx  - Avoid SQH / Lovenox d/t h/o HIT  - Consider starting Fondaparinux if kidney function improves     ETHICS:   - Darci Rodriguez and Micaela are HCP      DISPO: Downgrade to stepdown              For Followup:  [ ] F/u lipid panel, CPK, LFTs, restart statin tomorrow if stable   [ ] F/u NSx recommendations, restart ASA and AC when able   [ ] Helmet when OOB  [ ] F/u cardiology recommendations regarding WHIT, ID not c/s at this time iso negative BCx   [ ] Swallow evaluation   [ ] CTM BMP, renal function and sodium improving   [ ] F/u TOV   [ ] CTM WBC, fever curve, and respiratory secretions, off abx for now   [ ] CTM Plts, s/p 1u Plts on 11/13  [ ] Consider starting Fondaparinux if kidney function improves  [ ] Follow nephrology, cardiology, neurosurgery recommendations Neuro ICU Transfer Note    Transfer to: Stepdown       HPI:  82y M with PMH HTN, CAD s/p stents, renal cell carcinoma status post left nephrectomy, bladder CA, BPH, CHF, LBBB, vertigo,  HLD, 5.5cm AAA without rupture post EVAR, paroxysmal A-fib on Eliquis, Plavix, and aspirin, early stage Alzheimer dementia transferred from [facility] s/p unwitnessed fall with head strike at home found by wife on floor at 8am. Patient brought to urgent care by wife and had 5 staples to posterior scalp but was instructed to go to nearest ED.  CT head at [place] showed R frontal IPH, SDH, SAH at 9:00. CTA stroke protocol not performed at [facility]. Patient BIB on 6L NC.  Pt GCS 15 on arrival, A&Ox2 on arrival. After initial assessment, patient noted to be vomiting enroute to CT scanner.    (10 Nov 2023 11:04)    Neuro ICU COURSE:  Patient trauma B upon arrival to [facility], OR for right decompressive craniectomy with Dr. Larios, brought to NeuroICU intubated and hemodynamically stable. Nephrology consulted 11/11 for non-oliguric DONINE, now improving. Patient febrile on POD #1, pan-cultured. TTE performed on 11, found to have calcified AV with mobile echodensity, cannot r/o vegetation, cardiology consulted. Blood cultures have had NGTD. Patient in rapid AF on 11/13, started on cardizem. Patient extubated on 11/13.           Vital Signs Last 24 Hrs  T(C): 37.4 (14 Nov 2023 07:00), Max: 38.1 (13 Nov 2023 19:00)  T(F): 99.3 (14 Nov 2023 07:00), Max: 100.6 (13 Nov 2023 19:00)  HR: 95 (14 Nov 2023 07:00) (65 - 99)  BP: 160/76 (14 Nov 2023 07:00) (105/74 - 160/76)  BP(mean): 99 (14 Nov 2023 07:00) (75 - 105)  RR: 21 (14 Nov 2023 07:00) (15 - 24)  SpO2: 95% (14 Nov 2023 07:00) (91% - 100%)    Parameters below as of 13 Nov 2023 18:00  Patient On (Oxygen Delivery Method): room air      I&O's Summary    13 Nov 2023 07:01  -  14 Nov 2023 07:00  --------------------------------------------------------  IN: 1683.4 mL / OUT: 1255 mL / NET: 428.4 mL        MEDICATIONS  (STANDING):  chlorhexidine 2% Cloths 1 Application(s) Topical <User Schedule>  dexMEDEtomidine Infusion 0.2 MICROgram(s)/kG/Hr (4 mL/Hr) IV Continuous <Continuous>  diltiazem    Tablet 30 milliGRAM(s) Oral every 6 hours  diltiazem Injectable 20 milliGRAM(s) IV Push once  diphtheria/tetanus/pertussis (acellular) Vaccine (Adacel) 0.5 milliLiter(s) IntraMuscular once  levETIRAcetam  IVPB 500 milliGRAM(s) IV Intermittent every 12 hours  polyethylene glycol 3350 17 Gram(s) Oral daily  senna 2 Tablet(s) Oral at bedtime  sodium bicarbonate 650 milliGRAM(s) Oral three times a day    MEDICATIONS  (PRN):  hydrALAZINE Injectable 10 milliGRAM(s) IV Push every 2 hours PRN SBP>160  labetalol Injectable 10 milliGRAM(s) IV Push every 2 hours PRN SBP>160  oxyCODONE    IR 5 milliGRAM(s) Oral every 4 hours PRN Moderate Pain (4 - 6)  oxyCODONE    IR 10 milliGRAM(s) Oral every 4 hours PRN Severe Pain (7 - 10)        LABS                                            8.0                   Neurophils% (auto):   x      (11-14 @ 04:46):    11.57)-----------(176          Lymphocytes% (auto):  x                                             24.1                   Eosinphils% (auto):   x        Manual%: Neutrophils x    ; Lymphocytes x    ; Eosinophils x    ; Bands%: x    ; Blasts x                                    151    |  119    |  55.8                Calcium: 8.6   / iCa: x      (11-14 @ 04:46)    ----------------------------<  125       Magnesium: 2.6                              4.2     |  18.0   |  2.21             Phosphorous: 3.1          RADIOLOGY:  < from: CT Head No Cont (11.12.23 @ 09:08) >    Status post evacuation of a right-sided intraparenchymal   hemorrhage. Multicompartment intracranial hemorrhages improved to stable.   Status post right-sided subdural drainage catheter removal. New small   hypodense subdural collection at the craniectomy site.    < end of copied text >      < from: US Duplex Venous Upper Ext Complete, Bilateral (11.13.23 @ 19:20) >    No evidence of deep venous thrombosis in either upper extremity.    Superficial thrombus noted in the left cephalic vein.    < end of copied text >      < from: MR Head No Cont (11.13.23 @ 21:36) >    RIGHT frontoparietal craniectomy with underlying   intracranial hemorrhage and edema within the RIGHT frontal lobe.   Overlying small RIGHT subdural hemorrhage is also unchanged. Scant   hemorrhage in the dependent portions of the BILATERAL lateral ventricles   and minimal subarachnoid hemorrhage seen in the BILATERAL frontal and   parietal lobes. Moderate periventricular and deep white matter ischemia   is noted. Encephalomalacia and gliosis seen in the anterior frontal lobes   bilaterally.Tiny acute lacunar infarction in the LEFT cerebellum and   tiny acute cortical infarction in the RIGHT parietal lobe. Restricted   diffusion also noted about the surgical cavity suggesting infarcted   parenchyma.    < end of copied text >        ASSESSMENT & PLAN:   Chief complaint:   Patient is a 82y M, who presented on 11/10 s/p unwitnessed fall with head strike, found to have Rt frontal IPH, SDH, SAH, s/p right decompressive craniectomy with Dr. Larios. Course c/b rapid AFib, nonoliguric DONNIE, and AV vegetation? on TTE.     NEURO  #Traumatic ICH, iso AC and DAPT   #Alzheimer's Dementia    - Continue Q2 Neurochecks  - SBP goal <160  - HOLD AC/APA  - Seizure ppx with Keppra for 7 days  - Resumed home Nam[place]   - Needs helmet when OOB, orthotist c/s     CV  #H/o CAD, Afib  - SBP Goal: <160  - CAD: resume ASA when safe per NSGY   - F/u lipid panel, CPK, LFTs, restart statin tomorrow if stable   - TTE: Possible vegetation?, cardiology following for possible WHIT, f/u recommendations   - BCx without growth in 48h   - Continue Cardizem 30 Q6     PULM  - Extubated 11/13  - SpO2 > 92% on RA  - Sputum Cx with Klebsiella on 11/11, CTM WBC and secretions off abx   - Mucomyst, Duonebs     GI  - TF via OGT  - Failed swallow eval 11/14   - Bowel Regimen with senna/Miralax, LBM 11/13    ID  #Fevers  - Possibly Central in nature  - Tmax 11/11 101.8, WBC now downtrending  - UA 11/11 negative   - CXR - No consolidations  - Sputum Cx - Klebsiella  - BCx - NGTD  - No abx at this time, if Tmax >101.3 or hemodynamic instability will start abx for Klebsiella Tracheobronchitis    RENAL  #H/o Lt RCC s/p nephrectomy  #DONNIE  - Baseline SCr <1, peaked at 4, now down to 2.21  - Likely ATN from ROXANNE  - Continue oral sodium bicarb  - Monitor, avoid nephrotoxic meds  - Appreciate Renal recommendations  - D/c'd hall on 11/14, f/u TOV    #Hyponatremia, resolved   - 2% NaCl held, Na over-corrected to 151  - F/u repeat BMP    ENDO  - No active issues  - Goal Glucose <180   - F/u HbA1c     HEME  #Thrombocytopenia  - Plt gilberto at 86, now 176  - Received 1u Plt on 11/14  - Will CTM, goal Plt >100    #VTE Ppx: SCDs   - Per NSx, OK to start chemical DVT ppx  - Avoid SQH / Lovenox d/t h/o HIT  - Consider starting Fondaparinux if kidney function improves     ETHICS:   - Darci Rodriguez and Micaela are HCP      DISPO: Downgrade to stepdown              For Followup:  [ ] F/u lipid panel, CPK, LFTs, restart statin tomorrow if stable   [ ] F/u NSx recommendations, restart ASA and AC when able   [ ] Helmet when OOB  [ ] F/u cardiology recommendations regarding WHIT, ID not c/s at this time iso negative BCx   [ ] Swallow evaluation   [ ] CTM BMP, renal function and sodium improving   [ ] F/u TOV   [ ] CTM WBC, fever curve, and respiratory secretions, off abx for now   [ ] CTM Plts, s/p 1u Plts on 11/13  [ ] Consider starting Fondaparinux if kidney function improves  [ ] Follow nephrology, cardiology, neurosurgery recommendations    ---------------------------------------------------------  ATTENDING ATTESTATION    82y M with TBI - R frontal IPH, SDH, tSAH; s/p R decompressive hemicraniectomy 11/10.  PMH of CAD s/p stents, CHF; HTN, HLD. AAA post EVAR. Parox. A.Fib, on Cardizem. Not on AC now in view of TBI.  PMH of renal cell carcinoma status post left nephrectomy, bladder CA, BPH  Nonoliguric DONNIE, likely ROXANNE, critical illness.  AV vegetation on TTE - unclear etiology. BCx negative.    Now with signs of resp. distress. Signs of pulm congestion on sono/CXR, RLL consolidation vs atelectasis, impaired bulbar function with difficulty managing secretions.  Hypernatremia.     Plan:  neurochecks  cont management in ICU (transfer to the floor cancelled) in view of borderline resp status  maintain Osats>92%, low threshold for intubation; start nebs + Mucomyst, aggressive chest PT, suctioning   cont Diltiazem for rate control, maintain -150  NPO except meds for now, hold fluids for now  monitor e-lytes and UOP; BMP q8hrs; repeat CK, LFT, lipids  start Cefepime for Klebs tracheobronchitis vs aspiration PNA, renal dose  SCDs, cannot start ppx AC as with h/o HIT, impaired renal function CrCl 31      Time spent - 50 min, critical.  Pt is critically ill and at high risk of rapid deterioration/death due to signs of respiratory distress.   Still requires critical care interventions - ongoing frequent evaluations, interventions and management adjustment by the Attending and ICU team, - and included review of relevant history, clinical examination, review of data and images, discussion of treatment with the multidisciplinary team and any consultants involved in this patient’s care as well as family discussion.

## 2023-11-14 NOTE — PROVIDER CONTACT NOTE (EICU) - BACKGROUND
82 year old male w/ PMhx of HTN, CAD, renal cell ca s/p nephrectomy, bladder ca, BPH, CHF, AAA, admitted on 11/10 s/p unwitnessed fall with head strike, found to have Rt frontal IPH, SDH, SAH, s/p right decompressive craniectomy

## 2023-11-14 NOTE — PROGRESS NOTE ADULT - SUBJECTIVE AND OBJECTIVE BOX
[facility] Division of Hospital Medicine  Transfer of Care Note NS ICU to Medicine Stepdown    SUBJECTIVE / OVERNIGHT EVENTS:  Brief Hospital Course  82M with PMH HTN, HLD pAF (On Eliquis) CAD s/p stents (On DAPT), renal cell carcinoma status post left nephrectomy, bladder CA, BPH, CHF, LBBB, HLD, 5.5cm AAA without rupture post EVAR, early stage Alzheimer dementia transferred from [facility] s/p unwitnessed fall with head strike at home. CT head at [place] showed R frontal IPH, SDH, SAH. Taken to OR for urgent right decompressive craniectomy with Dr. Larios and admitted to Neuro ICU still intubated. Nephrology consulted 11/11 for non-oliguric DONNIE. CCB fever on POD #1. TTE performed on 11/11, found to have calcified AV with mobile echodensity, cardiology consulted. Blood cultures NGTD. CCB rapid AF on 11/13, started on cardizem. Patient extubated on 11/13.      Patient denies chest pain, SOB, abd pain, N/V, fever, chills, dysuria or any other complaints. All remainder ROS negative.     MEDICATIONS  (STANDING):  acetylcysteine 10%  Inhalation 4 milliLiter(s) Inhalation every 6 hours  albuterol/ipratropium for Nebulization 3 milliLiter(s) Nebulizer every 6 hours  chlorhexidine 2% Cloths 1 Application(s) Topical <User Schedule>  diltiazem    Tablet 30 milliGRAM(s) Oral every 6 hours  diltiazem Injectable 20 milliGRAM(s) IV Push once  diphtheria/tetanus/pertussis (acellular) Vaccine (Adacel) 0.5 milliLiter(s) IntraMuscular once  levETIRAcetam  Solution 500 milliGRAM(s) Oral two times a day  memantine 5 milliGRAM(s) Oral two times a day  polyethylene glycol 3350 17 Gram(s) Oral daily  senna 2 Tablet(s) Oral at bedtime  sodium bicarbonate 650 milliGRAM(s) Oral three times a day    MEDICATIONS  (PRN):  hydrALAZINE Injectable 10 milliGRAM(s) IV Push every 2 hours PRN SBP>160  labetalol Injectable 10 milliGRAM(s) IV Push every 2 hours PRN SBP>160  oxyCODONE    IR 5 milliGRAM(s) Oral every 4 hours PRN Moderate Pain (4 - 6)  oxyCODONE    IR 10 milliGRAM(s) Oral every 4 hours PRN Severe Pain (7 - 10)        I&O's Summary    13 Nov 2023 07:01  -  14 Nov 2023 07:00  --------------------------------------------------------  IN: 1683.4 mL / OUT: 1255 mL / NET: 428.4 mL    14 Nov 2023 07:01  -  14 Nov 2023 15:29  --------------------------------------------------------  IN: 0 mL / OUT: 630 mL / NET: -630 mL        PHYSICAL EXAM:  Vital Signs Last 24 Hrs  T(C): 37.8 (14 Nov 2023 15:00), Max: 38.1 (13 Nov 2023 19:00)  T(F): 100 (14 Nov 2023 15:00), Max: 100.6 (13 Nov 2023 19:00)  HR: 96 (14 Nov 2023 15:00) (67 - 99)  BP: 158/73 (14 Nov 2023 15:00) (105/74 - 160/76)  BP(mean): 97 (14 Nov 2023 15:00) (79 - 105)  RR: 23 (14 Nov 2023 15:00) (15 - 24)  SpO2: 96% (14 Nov 2023 15:00) (91% - 100%)    Parameters below as of 14 Nov 2023 12:00  Patient On (Oxygen Delivery Method): room air            CONSTITUTIONAL: NAD, appears stated age  ENMT: Moist oral mucosa, no pharyngeal injection or exudates; normal dentition  RESPIRATORY: Normal respiratory effort; clear to auscultation bilaterally  CARDIOVASCULAR: Regular rate and rhythm, normal S1 and S2, no murmur/rub/gallop; Peripheral pulses are 2+ bilaterally  ABDOMEN: Nontender to palpation, normoactive bowel sounds, no rebound/guarding;   MUSCLOSKELETAL:  No clubbing or cyanosis of digits; no joint swelling or tenderness to palpation  PSYCH: A+O to person, place, and time; affect appropriate  NEUROLOGY: CN 2-12 are intact and symmetric; no gross sensory deficits;   SKIN: No rashes; no palpable lesions    LABS:                        8.0    11.57 )-----------( 176      ( 14 Nov 2023 04:46 )             24.1     11-14    152<H>  |  119<H>  |  55.5<H>  ----------------------------<  120<H>  4.2   |  19.0<L>  |  2.06<H>    Ca    8.9      14 Nov 2023 14:00  Phos  3.1     11-14  Mg     2.6     11-14    TPro  6.6  /  Alb  3.4  /  TBili  0.7  /  DBili  0.2  /  AST  20  /  ALT  13  /  AlkPhos  75  11-14      CARDIAC MARKERS ( 14 Nov 2023 14:00 )  x     / x     / 169 U/L / x     / 2.3 ng/mL  CARDIAC MARKERS ( 13 Nov 2023 01:24 )  x     / x     / 222 U/L / x     / 2.5 ng/mL      Urinalysis Basic - ( 14 Nov 2023 14:00 )    Color: x / Appearance: x / SG: x / pH: x  Gluc: 120 mg/dL / Ketone: x  / Bili: x / Urobili: x   Blood: x / Protein: x / Nitrite: x   Leuk Esterase: x / RBC: x / WBC x   Sq Epi: x / Non Sq Epi: x / Bacteria: x        Culture - Blood (collected 13 Nov 2023 07:40)  Source: .Blood Blood-Peripheral  Preliminary Report (14 Nov 2023 14:01):    No growth at 24 hours    Culture - Blood (collected 12 Nov 2023 17:55)  Source: .Blood Blood-Peripheral  Preliminary Report (14 Nov 2023 02:02):    No growth at 24 hours    Culture - Blood (collected 11 Nov 2023 16:19)  Source: .Blood Blood  Preliminary Report (13 Nov 2023 23:02):    No growth at 48 Hours    Culture - Sputum (collected 11 Nov 2023 16:11)  Source: ET Tube ET Tube  Gram Stain (12 Nov 2023 11:50):    Numerous polymorphonuclear leukocytes per low power field    Few Squamous epithelial cells per low power field    Few Yeast like cells per oil power field    Numerous Gram Negative Rods per oil power field  Final Report (14 Nov 2023 08:57):    Numerous Klebsiella aerogenes (Previously Enterobacter)    Normal Respiratory Isabell present  Organism: Klebsiella aerogenes (Previously Enterobacter) (14 Nov 2023 08:57)  Organism: Klebsiella aerogenes (Previously Enterobacter) (14 Nov 2023 08:57)    Culture - Blood (collected 11 Nov 2023 15:50)  Source: .Blood Blood  Preliminary Report (13 Nov 2023 23:02):    No growth at 48 Hours      CAPILLARY BLOOD GLUCOSE            RADIOLOGY & ADDITIONAL TESTS:  Results Reviewed:   Imaging Personally Reviewed:  Electrocardiogram Personally Reviewed:

## 2023-11-14 NOTE — PROVIDER CONTACT NOTE (OTHER) - ACTION/TREATMENT ORDERED:
Elevated arm and warm packs placed. Neuro MD at bedside administering medication for extravasation of medication. Refer to EMAR.

## 2023-11-14 NOTE — CHART NOTE - NSCHARTNOTEFT_GEN_A_CORE
Patient was fit and delivered a protective helmet to be worn when OOB or during transfers. Additional padding was added to offset the missing bone flap. Care use and function were explained to his daughter. Contact info was given to patient's daughter. All went without incident.   [facility]  [phone]

## 2023-11-15 LAB
ANION GAP SERPL CALC-SCNC: 12 MMOL/L — SIGNIFICANT CHANGE UP (ref 5–17)
ANION GAP SERPL CALC-SCNC: 12 MMOL/L — SIGNIFICANT CHANGE UP (ref 5–17)
ANION GAP SERPL CALC-SCNC: 14 MMOL/L — SIGNIFICANT CHANGE UP (ref 5–17)
BASE EXCESS BLDA CALC-SCNC: -1.8 MMOL/L — SIGNIFICANT CHANGE UP (ref -2–3)
BASE EXCESS BLDA CALC-SCNC: -1.8 MMOL/L — SIGNIFICANT CHANGE UP (ref -2–3)
BLOOD GAS COMMENTS ARTERIAL: SIGNIFICANT CHANGE UP
BLOOD GAS COMMENTS ARTERIAL: SIGNIFICANT CHANGE UP
BUN SERPL-MCNC: 51.9 MG/DL — HIGH (ref 8–20)
BUN SERPL-MCNC: 51.9 MG/DL — HIGH (ref 8–20)
BUN SERPL-MCNC: 54.4 MG/DL — HIGH (ref 8–20)
BUN SERPL-MCNC: 54.4 MG/DL — HIGH (ref 8–20)
BUN SERPL-MCNC: 55.7 MG/DL — HIGH (ref 8–20)
BUN SERPL-MCNC: 55.7 MG/DL — HIGH (ref 8–20)
CALCIUM SERPL-MCNC: 8.7 MG/DL — SIGNIFICANT CHANGE UP (ref 8.4–10.5)
CALCIUM SERPL-MCNC: 8.7 MG/DL — SIGNIFICANT CHANGE UP (ref 8.4–10.5)
CALCIUM SERPL-MCNC: 8.8 MG/DL — SIGNIFICANT CHANGE UP (ref 8.4–10.5)
CHLORIDE SERPL-SCNC: 117 MMOL/L — HIGH (ref 96–108)
CHLORIDE SERPL-SCNC: 117 MMOL/L — HIGH (ref 96–108)
CHLORIDE SERPL-SCNC: 119 MMOL/L — HIGH (ref 96–108)
CHLORIDE SERPL-SCNC: 119 MMOL/L — HIGH (ref 96–108)
CHLORIDE SERPL-SCNC: 122 MMOL/L — HIGH (ref 96–108)
CHLORIDE SERPL-SCNC: 122 MMOL/L — HIGH (ref 96–108)
CO2 SERPL-SCNC: 19 MMOL/L — LOW (ref 22–29)
CO2 SERPL-SCNC: 19 MMOL/L — LOW (ref 22–29)
CO2 SERPL-SCNC: 22 MMOL/L — SIGNIFICANT CHANGE UP (ref 22–29)
CO2 SERPL-SCNC: 22 MMOL/L — SIGNIFICANT CHANGE UP (ref 22–29)
CO2 SERPL-SCNC: 23 MMOL/L — SIGNIFICANT CHANGE UP (ref 22–29)
CO2 SERPL-SCNC: 23 MMOL/L — SIGNIFICANT CHANGE UP (ref 22–29)
CREAT SERPL-MCNC: 1.88 MG/DL — HIGH (ref 0.5–1.3)
CREAT SERPL-MCNC: 1.88 MG/DL — HIGH (ref 0.5–1.3)
CREAT SERPL-MCNC: 2.03 MG/DL — HIGH (ref 0.5–1.3)
CREAT SERPL-MCNC: 2.03 MG/DL — HIGH (ref 0.5–1.3)
CREAT SERPL-MCNC: 2.06 MG/DL — HIGH (ref 0.5–1.3)
CREAT SERPL-MCNC: 2.06 MG/DL — HIGH (ref 0.5–1.3)
EGFR: 32 ML/MIN/1.73M2 — LOW
EGFR: 35 ML/MIN/1.73M2 — LOW
EGFR: 35 ML/MIN/1.73M2 — LOW
GAS PNL BLDA: SIGNIFICANT CHANGE UP
GAS PNL BLDA: SIGNIFICANT CHANGE UP
GLUCOSE SERPL-MCNC: 131 MG/DL — HIGH (ref 70–99)
GLUCOSE SERPL-MCNC: 131 MG/DL — HIGH (ref 70–99)
GLUCOSE SERPL-MCNC: 146 MG/DL — HIGH (ref 70–99)
GLUCOSE SERPL-MCNC: 146 MG/DL — HIGH (ref 70–99)
GLUCOSE SERPL-MCNC: 160 MG/DL — HIGH (ref 70–99)
GLUCOSE SERPL-MCNC: 160 MG/DL — HIGH (ref 70–99)
HCO3 BLDA-SCNC: 22 MMOL/L — SIGNIFICANT CHANGE UP (ref 21–28)
HCO3 BLDA-SCNC: 22 MMOL/L — SIGNIFICANT CHANGE UP (ref 21–28)
HCT VFR BLD CALC: 24.8 % — LOW (ref 39–50)
HCT VFR BLD CALC: 24.8 % — LOW (ref 39–50)
HGB BLD-MCNC: 8.4 G/DL — LOW (ref 13–17)
HGB BLD-MCNC: 8.4 G/DL — LOW (ref 13–17)
MAGNESIUM SERPL-MCNC: 2.6 MG/DL — SIGNIFICANT CHANGE UP (ref 1.6–2.6)
MCHC RBC-ENTMCNC: 31.8 PG — SIGNIFICANT CHANGE UP (ref 27–34)
MCHC RBC-ENTMCNC: 31.8 PG — SIGNIFICANT CHANGE UP (ref 27–34)
MCHC RBC-ENTMCNC: 33.9 GM/DL — SIGNIFICANT CHANGE UP (ref 32–36)
MCHC RBC-ENTMCNC: 33.9 GM/DL — SIGNIFICANT CHANGE UP (ref 32–36)
MCV RBC AUTO: 93.9 FL — SIGNIFICANT CHANGE UP (ref 80–100)
MCV RBC AUTO: 93.9 FL — SIGNIFICANT CHANGE UP (ref 80–100)
PCO2 BLDA: 32 MMHG — LOW (ref 35–48)
PCO2 BLDA: 32 MMHG — LOW (ref 35–48)
PH BLDA: 7.45 — SIGNIFICANT CHANGE UP (ref 7.35–7.45)
PH BLDA: 7.45 — SIGNIFICANT CHANGE UP (ref 7.35–7.45)
PHOSPHATE SERPL-MCNC: 3 MG/DL — SIGNIFICANT CHANGE UP (ref 2.4–4.7)
PHOSPHATE SERPL-MCNC: 3 MG/DL — SIGNIFICANT CHANGE UP (ref 2.4–4.7)
PLATELET # BLD AUTO: 187 K/UL — SIGNIFICANT CHANGE UP (ref 150–400)
PLATELET # BLD AUTO: 187 K/UL — SIGNIFICANT CHANGE UP (ref 150–400)
PO2 BLDA: 87 MMHG — SIGNIFICANT CHANGE UP (ref 83–108)
PO2 BLDA: 87 MMHG — SIGNIFICANT CHANGE UP (ref 83–108)
POTASSIUM SERPL-MCNC: 3.7 MMOL/L — SIGNIFICANT CHANGE UP (ref 3.5–5.3)
POTASSIUM SERPL-MCNC: 3.7 MMOL/L — SIGNIFICANT CHANGE UP (ref 3.5–5.3)
POTASSIUM SERPL-MCNC: 3.8 MMOL/L — SIGNIFICANT CHANGE UP (ref 3.5–5.3)
POTASSIUM SERPL-MCNC: 3.8 MMOL/L — SIGNIFICANT CHANGE UP (ref 3.5–5.3)
POTASSIUM SERPL-MCNC: 4 MMOL/L — SIGNIFICANT CHANGE UP (ref 3.5–5.3)
POTASSIUM SERPL-MCNC: 4 MMOL/L — SIGNIFICANT CHANGE UP (ref 3.5–5.3)
POTASSIUM SERPL-SCNC: 3.7 MMOL/L — SIGNIFICANT CHANGE UP (ref 3.5–5.3)
POTASSIUM SERPL-SCNC: 3.7 MMOL/L — SIGNIFICANT CHANGE UP (ref 3.5–5.3)
POTASSIUM SERPL-SCNC: 3.8 MMOL/L — SIGNIFICANT CHANGE UP (ref 3.5–5.3)
POTASSIUM SERPL-SCNC: 3.8 MMOL/L — SIGNIFICANT CHANGE UP (ref 3.5–5.3)
POTASSIUM SERPL-SCNC: 4 MMOL/L — SIGNIFICANT CHANGE UP (ref 3.5–5.3)
POTASSIUM SERPL-SCNC: 4 MMOL/L — SIGNIFICANT CHANGE UP (ref 3.5–5.3)
RAPID RVP RESULT: SIGNIFICANT CHANGE UP
RAPID RVP RESULT: SIGNIFICANT CHANGE UP
RBC # BLD: 2.64 M/UL — LOW (ref 4.2–5.8)
RBC # BLD: 2.64 M/UL — LOW (ref 4.2–5.8)
RBC # FLD: 13.2 % — SIGNIFICANT CHANGE UP (ref 10.3–14.5)
RBC # FLD: 13.2 % — SIGNIFICANT CHANGE UP (ref 10.3–14.5)
SAO2 % BLDA: 99.8 % — HIGH (ref 94–98)
SAO2 % BLDA: 99.8 % — HIGH (ref 94–98)
SARS-COV-2 RNA SPEC QL NAA+PROBE: SIGNIFICANT CHANGE UP
SARS-COV-2 RNA SPEC QL NAA+PROBE: SIGNIFICANT CHANGE UP
SODIUM SERPL-SCNC: 153 MMOL/L — HIGH (ref 135–145)
SODIUM SERPL-SCNC: 153 MMOL/L — HIGH (ref 135–145)
SODIUM SERPL-SCNC: 154 MMOL/L — HIGH (ref 135–145)
SODIUM SERPL-SCNC: 154 MMOL/L — HIGH (ref 135–145)
SODIUM SERPL-SCNC: 155 MMOL/L — HIGH (ref 135–145)
SODIUM SERPL-SCNC: 155 MMOL/L — HIGH (ref 135–145)
WBC # BLD: 10.94 K/UL — HIGH (ref 3.8–10.5)
WBC # BLD: 10.94 K/UL — HIGH (ref 3.8–10.5)
WBC # FLD AUTO: 10.94 K/UL — HIGH (ref 3.8–10.5)
WBC # FLD AUTO: 10.94 K/UL — HIGH (ref 3.8–10.5)

## 2023-11-15 PROCEDURE — 71045 X-RAY EXAM CHEST 1 VIEW: CPT | Mod: 26

## 2023-11-15 PROCEDURE — 99291 CRITICAL CARE FIRST HOUR: CPT

## 2023-11-15 RX ORDER — ASPIRIN/CALCIUM CARB/MAGNESIUM 324 MG
81 TABLET ORAL DAILY
Refills: 0 | Status: DISCONTINUED | OUTPATIENT
Start: 2023-11-15 | End: 2023-11-15

## 2023-11-15 RX ORDER — FUROSEMIDE 40 MG
20 TABLET ORAL ONCE
Refills: 0 | Status: COMPLETED | OUTPATIENT
Start: 2023-11-15 | End: 2023-11-15

## 2023-11-15 RX ORDER — ATORVASTATIN CALCIUM 80 MG/1
40 TABLET, FILM COATED ORAL AT BEDTIME
Refills: 0 | Status: DISCONTINUED | OUTPATIENT
Start: 2023-11-15 | End: 2023-11-25

## 2023-11-15 RX ORDER — ACETAMINOPHEN 500 MG
800 TABLET ORAL EVERY 6 HOURS
Refills: 0 | Status: DISCONTINUED | OUTPATIENT
Start: 2023-11-15 | End: 2023-11-27

## 2023-11-15 RX ORDER — FUROSEMIDE 40 MG
20 TABLET ORAL ONCE
Refills: 0 | Status: DISCONTINUED | OUTPATIENT
Start: 2023-11-15 | End: 2023-11-15

## 2023-11-15 RX ORDER — SODIUM CHLORIDE 9 MG/ML
1000 INJECTION, SOLUTION INTRAVENOUS
Refills: 0 | Status: DISCONTINUED | OUTPATIENT
Start: 2023-11-15 | End: 2023-11-16

## 2023-11-15 RX ORDER — DILTIAZEM HCL 120 MG
60 CAPSULE, EXT RELEASE 24 HR ORAL EVERY 8 HOURS
Refills: 0 | Status: DISCONTINUED | OUTPATIENT
Start: 2023-11-15 | End: 2023-11-16

## 2023-11-15 RX ORDER — CARVEDILOL PHOSPHATE 80 MG/1
12.5 CAPSULE, EXTENDED RELEASE ORAL EVERY 12 HOURS
Refills: 0 | Status: DISCONTINUED | OUTPATIENT
Start: 2023-11-15 | End: 2023-11-16

## 2023-11-15 RX ORDER — CARVEDILOL PHOSPHATE 80 MG/1
12.5 CAPSULE, EXTENDED RELEASE ORAL EVERY 12 HOURS
Refills: 0 | Status: DISCONTINUED | OUTPATIENT
Start: 2023-11-15 | End: 2023-11-15

## 2023-11-15 RX ORDER — ASPIRIN/CALCIUM CARB/MAGNESIUM 324 MG
81 TABLET ORAL DAILY
Refills: 0 | Status: DISCONTINUED | OUTPATIENT
Start: 2023-11-15 | End: 2023-11-26

## 2023-11-15 RX ADMIN — Medication 4 MILLILITER(S): at 15:24

## 2023-11-15 RX ADMIN — Medication 60 MILLIGRAM(S): at 22:36

## 2023-11-15 RX ADMIN — Medication 20 MILLIGRAM(S): at 13:17

## 2023-11-15 RX ADMIN — Medication 3 MILLILITER(S): at 15:24

## 2023-11-15 RX ADMIN — Medication 1000 MILLIGRAM(S): at 00:00

## 2023-11-15 RX ADMIN — ATORVASTATIN CALCIUM 40 MILLIGRAM(S): 80 TABLET, FILM COATED ORAL at 22:38

## 2023-11-15 RX ADMIN — Medication 800 MILLIGRAM(S): at 13:07

## 2023-11-15 RX ADMIN — Medication 4 MILLILITER(S): at 03:15

## 2023-11-15 RX ADMIN — Medication 10 MILLIGRAM(S): at 20:05

## 2023-11-15 RX ADMIN — Medication 3 MILLILITER(S): at 03:14

## 2023-11-15 RX ADMIN — Medication 10 MILLIGRAM(S): at 15:34

## 2023-11-15 RX ADMIN — Medication 3 MILLILITER(S): at 09:00

## 2023-11-15 RX ADMIN — POLYETHYLENE GLYCOL 3350 17 GRAM(S): 17 POWDER, FOR SOLUTION ORAL at 17:39

## 2023-11-15 RX ADMIN — Medication 10 MILLIGRAM(S): at 09:08

## 2023-11-15 RX ADMIN — Medication 81 MILLIGRAM(S): at 13:07

## 2023-11-15 RX ADMIN — Medication 10 MILLIGRAM(S): at 22:31

## 2023-11-15 RX ADMIN — Medication 30 MILLIGRAM(S): at 05:27

## 2023-11-15 RX ADMIN — LEVETIRACETAM 500 MILLIGRAM(S): 250 TABLET, FILM COATED ORAL at 17:38

## 2023-11-15 RX ADMIN — CEFEPIME 2000 MILLIGRAM(S): 1 INJECTION, POWDER, FOR SOLUTION INTRAMUSCULAR; INTRAVENOUS at 17:39

## 2023-11-15 RX ADMIN — MEMANTINE HYDROCHLORIDE 5 MILLIGRAM(S): 10 TABLET ORAL at 05:25

## 2023-11-15 RX ADMIN — Medication 3 MILLILITER(S): at 20:31

## 2023-11-15 RX ADMIN — CEFEPIME 2000 MILLIGRAM(S): 1 INJECTION, POWDER, FOR SOLUTION INTRAMUSCULAR; INTRAVENOUS at 05:29

## 2023-11-15 RX ADMIN — LEVETIRACETAM 500 MILLIGRAM(S): 250 TABLET, FILM COATED ORAL at 05:24

## 2023-11-15 RX ADMIN — CHLORHEXIDINE GLUCONATE 1 APPLICATION(S): 213 SOLUTION TOPICAL at 05:28

## 2023-11-15 RX ADMIN — SENNA PLUS 2 TABLET(S): 8.6 TABLET ORAL at 22:20

## 2023-11-15 RX ADMIN — SODIUM CHLORIDE 50 MILLILITER(S): 9 INJECTION, SOLUTION INTRAVENOUS at 03:57

## 2023-11-15 RX ADMIN — Medication 800 MILLIGRAM(S): at 14:00

## 2023-11-15 RX ADMIN — Medication 4 MILLILITER(S): at 20:32

## 2023-11-15 RX ADMIN — Medication 650 MILLIGRAM(S): at 22:32

## 2023-11-15 RX ADMIN — Medication 30 MILLIGRAM(S): at 13:09

## 2023-11-15 RX ADMIN — Medication 650 MILLIGRAM(S): at 13:08

## 2023-11-15 RX ADMIN — Medication 4 MILLILITER(S): at 08:59

## 2023-11-15 NOTE — OCCUPATIONAL THERAPY INITIAL EVALUATION ADULT - VISUAL ACUITY
Pt unable to follow commands regarding visual assessments, appears to have left sided neglect, will benefit from further assessment.

## 2023-11-15 NOTE — PROGRESS NOTE ADULT - ASSESSMENT
82y M with TBI - R frontal IPH, SDH, tSAH; s/p R decompressive hemicraniectomy 11/10.  Respiratory distress due to impaired bulbar function with difficulty managing secretions, aspiration PNA.  PMH of CAD s/p stents, CHF; HTN, HLD. AAA post EVAR. Parox. A.Fib, on Cardizem. Not on AC now in view of TBI.  PMH of renal cell carcinoma status post left nephrectomy, bladder CA, BPH  Nonoliguric DONNIE, likely ROXANNE, critical illness.  AV vegetation on TTE - unclear etiology. BCx negative.  Hypernatremia.     Plan:  neurochecks, protected sleep time  monitor resp status, started on HFNC with good response (less WOB, more awake, interacting), low threshold for intubation  cont nebs + Mucomyst, aggressive chest PT, suctioning   cont Diltiazem for rate control and HTN, increase to 60 q6hrs, maintain -150, HR<120  restart ASA 81 daily  cont TF 10 cc/hr for now; ulcer ppx, BM regimen  monitor e-lytes and UOP; dose of Lasix fo rsigns of pulm congestion;  cont D5W, BMP q8hrs  cont Cefepime for Klebs tracheobronchitis vs aspiration PNA, renal dose  check RVP/COVID  SCDs, cannot start ppx AC as with h/o HIT, high risk of bleeding with TBI, impaired renal function CrCl 31  BL LE Dopplers

## 2023-11-15 NOTE — OCCUPATIONAL THERAPY INITIAL EVALUATION ADULT - BED MOBILITY LIMITATIONS, REHAB EVAL
I have seen and evaluated patient and I agree with resident assessment and plan. Fistula to rectal stump noted on CT scan. The drainage site should be isolated at upcoming procedure. decreased ability to use arms for pushing/pulling/decreased ability to use legs for bridging/pushing

## 2023-11-15 NOTE — PHYSICAL THERAPY INITIAL EVALUATION ADULT - PERTINENT HX OF CURRENT PROBLEM, REHAB EVAL
82y M with PMH HTN, CAD s/p stents, renal cell carcinoma status post left nephrectomy, bladder CA, BPH, CHF, LBBB, vertigo,  HLD, 5.5cm AAA without rupture post EVAR, paroxysmal A-fib on Eliquis, Plavix, and aspirin, early stage Alzheimer dementia transferred from [facility] s/p unwitnessed fall with head strike at home found by wife on floor at 8am. Patient brought to urgent care by wife and had 5 staples to posterior scalp but was instructed to go to nearest ED.  CT head at [place] showed R frontal IPH, SDH, SAH at 9:00. CTA stroke protocol not performed at [facility].

## 2023-11-15 NOTE — PROGRESS NOTE ADULT - SUBJECTIVE AND OBJECTIVE BOX
HPI:  82y M with PMH HTN, CAD s/p stents, renal cell carcinoma status post left nephrectomy, bladder CA, BPH, CHF, LBBB, vertigo,  HLD, 5.5cm AAA without rupture post EVAR, paroxysmal A-fib on Eliquis, Plavix, and aspirin, early stage Alzheimer dementia transferred from [facility] s/p unwitnessed fall with head strike at home found by wife on floor at 8am. Patient brought to urgent care by wife and had 5 staples to posterior scalp but was instructed to go to nearest ED.  CT head at [place] showed R frontal IPH, SDH, SAH at 9:00. CTA stroke protocol not performed at [facility]. Patient BIB on 6L NC.  Pt GCS 15 on arrival, A&Ox2 on arrival. After initial assessment, patient noted to be vomiting enroute to CT scanner.    (10 Nov 2023 11:04)    24 hr events: in respiratory distress, requires supplemental O2, increased WOB as of this am, more somnolent.    ICU Vital Signs Last 24 Hrs  T(C): 37.7 (15 Nov 2023 16:00), Max: 38.4 (14 Nov 2023 23:00)  T(F): 99.9 (15 Nov 2023 16:00), Max: 101.1 (14 Nov 2023 23:00)  HR: 93 (15 Nov 2023 16:00) (83 - 119)  BP: 156/87 (15 Nov 2023 16:00) (133/45 - 162/92)  BP(mean): 118 (15 Nov 2023 16:00) (66 - 144)  ABP: --  ABP(mean): --  RR: 19 (15 Nov 2023 16:00) (18 - 29)  SpO2: 99% (15 Nov 2023 16:00) (91% - 100%)    O2 Parameters below as of 15 Nov 2023 16:00  Patient On (Oxygen Delivery Method): nasal cannula, high flow  O2 Flow (L/min): 40  O2 Concentration (%): 40      Labs, imaging reviewed.      Exam:  EO attempts to verbal, follows some commands, more somnolent today, EOMI, PERRLA, impaired cough, motor - RUE 3/5, RLE 3/5, LUE wdrl, LLE wdrl.  Diminished breath sounds BL at bases  S1S2 present  Abd soft, NT, ND  No peripheral edema

## 2023-11-15 NOTE — CHART NOTE - NSCHARTNOTEFT_GEN_A_CORE
Spoke to Jennifer about patient respiratory status. Discussed that if increased work of breathing persists despite all non-invasive efforts, reintubation will be done. Jennifer agrees and understands this plan.

## 2023-11-16 LAB
ANION GAP SERPL CALC-SCNC: 11 MMOL/L — SIGNIFICANT CHANGE UP (ref 5–17)
ANION GAP SERPL CALC-SCNC: 11 MMOL/L — SIGNIFICANT CHANGE UP (ref 5–17)
ANION GAP SERPL CALC-SCNC: 13 MMOL/L — SIGNIFICANT CHANGE UP (ref 5–17)
ANION GAP SERPL CALC-SCNC: 13 MMOL/L — SIGNIFICANT CHANGE UP (ref 5–17)
ANION GAP SERPL CALC-SCNC: 14 MMOL/L — SIGNIFICANT CHANGE UP (ref 5–17)
ANION GAP SERPL CALC-SCNC: 14 MMOL/L — SIGNIFICANT CHANGE UP (ref 5–17)
BUN SERPL-MCNC: 46.2 MG/DL — HIGH (ref 8–20)
BUN SERPL-MCNC: 46.2 MG/DL — HIGH (ref 8–20)
BUN SERPL-MCNC: 48 MG/DL — HIGH (ref 8–20)
BUN SERPL-MCNC: 48 MG/DL — HIGH (ref 8–20)
BUN SERPL-MCNC: 48.9 MG/DL — HIGH (ref 8–20)
BUN SERPL-MCNC: 48.9 MG/DL — HIGH (ref 8–20)
CALCIUM SERPL-MCNC: 8.7 MG/DL — SIGNIFICANT CHANGE UP (ref 8.4–10.5)
CALCIUM SERPL-MCNC: 8.7 MG/DL — SIGNIFICANT CHANGE UP (ref 8.4–10.5)
CALCIUM SERPL-MCNC: 8.9 MG/DL — SIGNIFICANT CHANGE UP (ref 8.4–10.5)
CHLORIDE SERPL-SCNC: 114 MMOL/L — HIGH (ref 96–108)
CHLORIDE SERPL-SCNC: 120 MMOL/L — HIGH (ref 96–108)
CHLORIDE SERPL-SCNC: 120 MMOL/L — HIGH (ref 96–108)
CO2 SERPL-SCNC: 23 MMOL/L — SIGNIFICANT CHANGE UP (ref 22–29)
CO2 SERPL-SCNC: 23 MMOL/L — SIGNIFICANT CHANGE UP (ref 22–29)
CO2 SERPL-SCNC: 24 MMOL/L — SIGNIFICANT CHANGE UP (ref 22–29)
CREAT SERPL-MCNC: 1.75 MG/DL — HIGH (ref 0.5–1.3)
CREAT SERPL-MCNC: 1.75 MG/DL — HIGH (ref 0.5–1.3)
CREAT SERPL-MCNC: 1.84 MG/DL — HIGH (ref 0.5–1.3)
CREAT SERPL-MCNC: 1.84 MG/DL — HIGH (ref 0.5–1.3)
CREAT SERPL-MCNC: 1.9 MG/DL — HIGH (ref 0.5–1.3)
CREAT SERPL-MCNC: 1.9 MG/DL — HIGH (ref 0.5–1.3)
CULTURE RESULTS: SIGNIFICANT CHANGE UP
EGFR: 35 ML/MIN/1.73M2 — LOW
EGFR: 35 ML/MIN/1.73M2 — LOW
EGFR: 36 ML/MIN/1.73M2 — LOW
EGFR: 36 ML/MIN/1.73M2 — LOW
EGFR: 38 ML/MIN/1.73M2 — LOW
EGFR: 38 ML/MIN/1.73M2 — LOW
GAS PNL BLDA: SIGNIFICANT CHANGE UP
GAS PNL BLDA: SIGNIFICANT CHANGE UP
GLUCOSE SERPL-MCNC: 141 MG/DL — HIGH (ref 70–99)
GLUCOSE SERPL-MCNC: 141 MG/DL — HIGH (ref 70–99)
GLUCOSE SERPL-MCNC: 142 MG/DL — HIGH (ref 70–99)
GLUCOSE SERPL-MCNC: 142 MG/DL — HIGH (ref 70–99)
GLUCOSE SERPL-MCNC: 144 MG/DL — HIGH (ref 70–99)
GLUCOSE SERPL-MCNC: 144 MG/DL — HIGH (ref 70–99)
HCT VFR BLD CALC: 26.6 % — LOW (ref 39–50)
HCT VFR BLD CALC: 26.6 % — LOW (ref 39–50)
HGB BLD-MCNC: 8.8 G/DL — LOW (ref 13–17)
HGB BLD-MCNC: 8.8 G/DL — LOW (ref 13–17)
MAGNESIUM SERPL-MCNC: 2.4 MG/DL — SIGNIFICANT CHANGE UP (ref 1.6–2.6)
MAGNESIUM SERPL-MCNC: 2.4 MG/DL — SIGNIFICANT CHANGE UP (ref 1.6–2.6)
MCHC RBC-ENTMCNC: 31 PG — SIGNIFICANT CHANGE UP (ref 27–34)
MCHC RBC-ENTMCNC: 31 PG — SIGNIFICANT CHANGE UP (ref 27–34)
MCHC RBC-ENTMCNC: 33.1 GM/DL — SIGNIFICANT CHANGE UP (ref 32–36)
MCHC RBC-ENTMCNC: 33.1 GM/DL — SIGNIFICANT CHANGE UP (ref 32–36)
MCV RBC AUTO: 93.7 FL — SIGNIFICANT CHANGE UP (ref 80–100)
MCV RBC AUTO: 93.7 FL — SIGNIFICANT CHANGE UP (ref 80–100)
PHOSPHATE SERPL-MCNC: 2.9 MG/DL — SIGNIFICANT CHANGE UP (ref 2.4–4.7)
PHOSPHATE SERPL-MCNC: 2.9 MG/DL — SIGNIFICANT CHANGE UP (ref 2.4–4.7)
PLATELET # BLD AUTO: 217 K/UL — SIGNIFICANT CHANGE UP (ref 150–400)
PLATELET # BLD AUTO: 217 K/UL — SIGNIFICANT CHANGE UP (ref 150–400)
POTASSIUM SERPL-MCNC: 3.6 MMOL/L — SIGNIFICANT CHANGE UP (ref 3.5–5.3)
POTASSIUM SERPL-MCNC: 3.6 MMOL/L — SIGNIFICANT CHANGE UP (ref 3.5–5.3)
POTASSIUM SERPL-MCNC: 4 MMOL/L — SIGNIFICANT CHANGE UP (ref 3.5–5.3)
POTASSIUM SERPL-MCNC: 4 MMOL/L — SIGNIFICANT CHANGE UP (ref 3.5–5.3)
POTASSIUM SERPL-MCNC: 4.2 MMOL/L — SIGNIFICANT CHANGE UP (ref 3.5–5.3)
POTASSIUM SERPL-MCNC: 4.2 MMOL/L — SIGNIFICANT CHANGE UP (ref 3.5–5.3)
POTASSIUM SERPL-SCNC: 3.6 MMOL/L — SIGNIFICANT CHANGE UP (ref 3.5–5.3)
POTASSIUM SERPL-SCNC: 3.6 MMOL/L — SIGNIFICANT CHANGE UP (ref 3.5–5.3)
POTASSIUM SERPL-SCNC: 4 MMOL/L — SIGNIFICANT CHANGE UP (ref 3.5–5.3)
POTASSIUM SERPL-SCNC: 4 MMOL/L — SIGNIFICANT CHANGE UP (ref 3.5–5.3)
POTASSIUM SERPL-SCNC: 4.2 MMOL/L — SIGNIFICANT CHANGE UP (ref 3.5–5.3)
POTASSIUM SERPL-SCNC: 4.2 MMOL/L — SIGNIFICANT CHANGE UP (ref 3.5–5.3)
RBC # BLD: 2.84 M/UL — LOW (ref 4.2–5.8)
RBC # BLD: 2.84 M/UL — LOW (ref 4.2–5.8)
RBC # FLD: 12.9 % — SIGNIFICANT CHANGE UP (ref 10.3–14.5)
RBC # FLD: 12.9 % — SIGNIFICANT CHANGE UP (ref 10.3–14.5)
SODIUM SERPL-SCNC: 151 MMOL/L — HIGH (ref 135–145)
SODIUM SERPL-SCNC: 154 MMOL/L — HIGH (ref 135–145)
SODIUM SERPL-SCNC: 154 MMOL/L — HIGH (ref 135–145)
SPECIMEN SOURCE: SIGNIFICANT CHANGE UP
WBC # BLD: 11.02 K/UL — HIGH (ref 3.8–10.5)
WBC # BLD: 11.02 K/UL — HIGH (ref 3.8–10.5)
WBC # FLD AUTO: 11.02 K/UL — HIGH (ref 3.8–10.5)
WBC # FLD AUTO: 11.02 K/UL — HIGH (ref 3.8–10.5)

## 2023-11-16 PROCEDURE — 70450 CT HEAD/BRAIN W/O DYE: CPT | Mod: 26

## 2023-11-16 PROCEDURE — 99291 CRITICAL CARE FIRST HOUR: CPT

## 2023-11-16 PROCEDURE — 93970 EXTREMITY STUDY: CPT | Mod: 26

## 2023-11-16 PROCEDURE — 95720 EEG PHY/QHP EA INCR W/VEEG: CPT

## 2023-11-16 RX ORDER — POTASSIUM CHLORIDE 20 MEQ
40 PACKET (EA) ORAL ONCE
Refills: 0 | Status: COMPLETED | OUTPATIENT
Start: 2023-11-16 | End: 2023-11-16

## 2023-11-16 RX ORDER — SODIUM CHLORIDE 9 MG/ML
1000 INJECTION, SOLUTION INTRAVENOUS
Refills: 0 | Status: DISCONTINUED | OUTPATIENT
Start: 2023-11-16 | End: 2023-11-17

## 2023-11-16 RX ORDER — DILTIAZEM HCL 120 MG
90 CAPSULE, EXT RELEASE 24 HR ORAL EVERY 8 HOURS
Refills: 0 | Status: DISCONTINUED | OUTPATIENT
Start: 2023-11-16 | End: 2023-11-20

## 2023-11-16 RX ADMIN — Medication 90 MILLIGRAM(S): at 22:18

## 2023-11-16 RX ADMIN — Medication 4 MILLILITER(S): at 09:01

## 2023-11-16 RX ADMIN — SENNA PLUS 2 TABLET(S): 8.6 TABLET ORAL at 22:21

## 2023-11-16 RX ADMIN — LEVETIRACETAM 500 MILLIGRAM(S): 250 TABLET, FILM COATED ORAL at 17:10

## 2023-11-16 RX ADMIN — Medication 4 MILLILITER(S): at 04:25

## 2023-11-16 RX ADMIN — Medication 650 MILLIGRAM(S): at 05:27

## 2023-11-16 RX ADMIN — Medication 81 MILLIGRAM(S): at 12:02

## 2023-11-16 RX ADMIN — Medication 10 MILLIGRAM(S): at 19:05

## 2023-11-16 RX ADMIN — ATORVASTATIN CALCIUM 40 MILLIGRAM(S): 80 TABLET, FILM COATED ORAL at 22:21

## 2023-11-16 RX ADMIN — SODIUM CHLORIDE 100 MILLILITER(S): 9 INJECTION, SOLUTION INTRAVENOUS at 13:09

## 2023-11-16 RX ADMIN — LEVETIRACETAM 500 MILLIGRAM(S): 250 TABLET, FILM COATED ORAL at 05:25

## 2023-11-16 RX ADMIN — CEFEPIME 2000 MILLIGRAM(S): 1 INJECTION, POWDER, FOR SOLUTION INTRAMUSCULAR; INTRAVENOUS at 05:30

## 2023-11-16 RX ADMIN — CARVEDILOL PHOSPHATE 12.5 MILLIGRAM(S): 80 CAPSULE, EXTENDED RELEASE ORAL at 05:25

## 2023-11-16 RX ADMIN — POLYETHYLENE GLYCOL 3350 17 GRAM(S): 17 POWDER, FOR SOLUTION ORAL at 12:02

## 2023-11-16 RX ADMIN — CHLORHEXIDINE GLUCONATE 1 APPLICATION(S): 213 SOLUTION TOPICAL at 05:31

## 2023-11-16 RX ADMIN — Medication 3 MILLILITER(S): at 09:00

## 2023-11-16 RX ADMIN — Medication 90 MILLIGRAM(S): at 13:09

## 2023-11-16 RX ADMIN — Medication 3 MILLILITER(S): at 04:25

## 2023-11-16 RX ADMIN — CEFEPIME 2000 MILLIGRAM(S): 1 INJECTION, POWDER, FOR SOLUTION INTRAMUSCULAR; INTRAVENOUS at 17:10

## 2023-11-16 RX ADMIN — Medication 40 MILLIEQUIVALENT(S): at 05:25

## 2023-11-16 RX ADMIN — Medication 60 MILLIGRAM(S): at 05:30

## 2023-11-16 NOTE — CHART NOTE - NSCHARTNOTEFT_GEN_A_CORE
EEG preliminary read (not final) on the initial recording hour(s) = x 2    No seizures recorded.  Occasional right frontocentral sharp waves.   Right hemispheric slowing with breach artifact due to skull defect.     Moderate slowing noted, nonspecific.  Final report to follow tomorrow morning after completion of study.    [facility] EEG Reading Room Ph#: [phone]  Epilepsy Answering Service after 5PM and before 8:30AM: Ph#: [phone]

## 2023-11-16 NOTE — PROGRESS NOTE ADULT - SUBJECTIVE AND OBJECTIVE BOX
HPI:  82y M with PMH HTN, CAD s/p stents, renal cell carcinoma status post left nephrectomy, bladder CA, BPH, CHF, LBBB, vertigo,  HLD, 5.5cm AAA without rupture post EVAR, paroxysmal A-fib on Eliquis, Plavix, and aspirin, early stage Alzheimer dementia transferred from [facility] s/p unwitnessed fall with head strike at home found by wife on floor at 8am. Patient brought to urgent care by wife and had 5 staples to posterior scalp but was instructed to go to nearest ED.  CT head at [place] showed R frontal IPH, SDH, SAH at 9:00. CTA stroke protocol not performed at [facility]. Patient BIB on 6L NC.  Pt GCS 15 on arrival, A&Ox2 on arrival. After initial assessment, patient noted to be vomiting enroute to CT scanner.    (10 Nov 2023 11:04)    24 hr events: in respiratory distress, requires supplemental O2, increased WOB as of this am, more somnolent.    ICU Vital Signs Last 24 Hrs  T(C): 37.7 (15 Nov 2023 16:00), Max: 38.4 (14 Nov 2023 23:00)  T(F): 99.9 (15 Nov 2023 16:00), Max: 101.1 (14 Nov 2023 23:00)  HR: 93 (15 Nov 2023 16:00) (83 - 119)  BP: 156/87 (15 Nov 2023 16:00) (133/45 - 162/92)  BP(mean): 118 (15 Nov 2023 16:00) (66 - 144)  ABP: --  ABP(mean): --  RR: 19 (15 Nov 2023 16:00) (18 - 29)  SpO2: 99% (15 Nov 2023 16:00) (91% - 100%)    O2 Parameters below as of 15 Nov 2023 16:00  Patient On (Oxygen Delivery Method): nasal cannula, high flow  O2 Flow (L/min): 40  O2 Concentration (%): 40      Labs, imaging reviewed.      Exam:  EO attempts to verbal, follows some commands, more somnolent today, EOMI, PERRLA, impaired cough, motor - RUE 3/5, RLE 3/5, LUE wdrl, LLE wdrl.  Diminished breath sounds BL at bases  S1S2 present  Abd soft, NT, ND  No peripheral edema HPI:  82y M with PMH HTN, CAD s/p stents, renal cell carcinoma status post left nephrectomy, bladder CA, BPH, CHF, LBBB, vertigo,  HLD, 5.5cm AAA without rupture post EVAR, paroxysmal A-fib on Eliquis, Plavix, and aspirin, early stage Alzheimer dementia transferred from [facility] s/p unwitnessed fall with head strike at home found by wife on floor at 8am. Patient brought to urgent care by wife and had 5 staples to posterior scalp but was instructed to go to nearest ED.  CT head at [place] showed R frontal IPH, SDH, SAH at 9:00. CTA stroke protocol not performed at [facility]. Patient BIB on 6L NC.  Pt GCS 15 on arrival, A&Ox2 on arrival. After initial assessment, patient noted to be vomiting enroute to CT scanner.    (10 Nov 2023 11:04)    24 hr events: more awake this am.    ICU Vital Signs Last 24 Hrs  T(C): 37.7 (15 Nov 2023 16:00), Max: 38.4 (14 Nov 2023 23:00)  T(F): 99.9 (15 Nov 2023 16:00), Max: 101.1 (14 Nov 2023 23:00)  HR: 93 (15 Nov 2023 16:00) (83 - 119)  BP: 156/87 (15 Nov 2023 16:00) (133/45 - 162/92)  BP(mean): 118 (15 Nov 2023 16:00) (66 - 144)  ABP: --  ABP(mean): --  RR: 19 (15 Nov 2023 16:00) (18 - 29)  SpO2: 99% (15 Nov 2023 16:00) (91% - 100%)    O2 Parameters below as of 15 Nov 2023 16:00  Patient On (Oxygen Delivery Method): nasal cannula, high flow  O2 Flow (L/min): 40  O2 Concentration (%): 40      Labs, imaging reviewed.      Exam:  EO attempts to verbal, follows some commands, EOMI, PERRLA, impaired cough, motor - RUE 3/5, RLE 3/5, LUE wdrl, LLE wdrl.  Diminished breath sounds BL at bases  S1S2 present  Abd soft, NT, ND  No peripheral edema

## 2023-11-16 NOTE — CONSULT NOTE ADULT - SUBJECTIVE AND OBJECTIVE BOX
Consult requested by: JODIE RICHARDSON     Role: Physician Assistant		Service: Neuroscience ICU	  Attending: HARI Peters MD, Neuro Critical Care	  Consultant: Joi Montenegro MA (Ethics Fellow) Dory Damon MD, MS, MPH, [facility] (Ethics Attending) Contact #s: [phone] (Fellow Cell) [phone] (Office)    CONSULT PURPOSE: To assist in the ethical dilemma of an 83-year-old male admitted for intraparenchymal hemorrhage, subdural hematoma and subarachnoid hemorrhage, regarding surrogacy.    CLINICAL SUMMARY (SEE NOTE 11/13/2023): This is an 83-year-old male with a history of hypertension, coronary artery disease status post stents, renal cell carcinoma status post left nephrectomy, bladder cancer, benign prostatic hyperplasia, congestive heart failure, left bundle branch block, vertigo, hyperlipidemia, 5.5 cm abdominal aortic aneurysm without rupture status post endovascular aneurysm repair, paroxysmal atrial fibrillation on Eliquis, Plavix, and aspirin, and early stage Alzheimer dementia who presented to [facility] on 11/10/2023 after being sent from urgent care, where they stapled his head laceration, because of an unwitnessed fall with head strike and the patient is on blood thinners. In the Emergency Department at [facility] a head CT revealed the patient to have a right front intraparenchymal hemorrhage (IPH), subdural hematoma (SDH) and subarachnoid hemorrhage (SAH), so the patient was subsequently transferred to [facility] for higher level of care. Once the patient arrived at [facility], the patient was noted to have a Joleen Coma Scale of 15, the initial exam revealed left sided facial droop and left sided weakness. The patient was noted to be vomiting while on route to the CT scanner and was intubated for airway protection. Neurosurgery evaluated the patient on 11/10/2023 and noted that further imaging was needed before determining a management plan. The repeat head CT revealed large parenchymal hemorrhage centered in the right frontal lobe resulting in a midline shift to the left of approximately 0.5cm, so neurosurgery performed a right decompressive craniectomy with Roshan Moreno drain placement and then the patient was brought to the Neuro ICU for further management. On 11/11/2023, the Neuro ICU attending noted the patient with an acute kidney injury (DONNIE) with a creatinine of 2.63. Nephrology was consulted and saw the patient on 11/11/2023 and noted the DONNIE was likely contrast induced nephropathy from the imaging contrast and that the patient has a history of DONNIE after contrast that briefly required dialysis, and they recommended bicarbonate by mouth, avoiding further nephrotoxins and close monitoring, dialysis was not required at that time. The patient had an echocardiogram on 11/11/2023 which showed a moderately calcified aortic valve with mobile echo density poorly visualized that may represent artifact versus mobile calcification versus vegetation. Cardiology saw the patient on 11/12/2023 to rule out endocarditis and noted that due to the echocardiogram results and that the patient is febrile and has leukocytosis, so that they will consider a transesophageal echocardiogram, if the blood cultures come back positive. On 11/12/2023, the Neuro ICU attending noted the patient with fevers that may be central therefore they are holding antibiotics unless there is indication of infection. Nephrology noted that the patient’s serum Creatinine had peaked at 4 and is now improving (down to 2.98) and recommended continuing to avoid nephrotoxic agents. Cardiology noted the patient’s initial blood culture on 11/11/2023 was negative, awaiting further blood cultures results from 11/12/2023 and/or 11/13/2023 and if positive they recommended a WHIT to confirm vegetation on the aortic valve as it would change the antibiotic treatment. On 11/13/2023, the Neuro ICU Attending noted the patient with improved white blood cell count and that the patient was extubated (patient was extubated at 1600 per respiratory flow sheet). Neurosurgery, Nephrology and Cardiology on consult. Ethics consult initiated to assist with surrogacy given social complexities.  			  Prognosis Estimate (survival in hrs, days, wks, mos, yrs): poor – per the team  Patient Decision-Making Capacity: Lacks Capacity – patient is intubated and opens eyes to name and answers some yes/no questions.   Patient Aware of: Diagnosis: Unknown	Prognosis: Unknown  Name of medical decision-maker should patient lack capacity (relationship): Jennifer, daughter and point person for children and Angela, Daughter [phone]) (Children are equal)  Role: Legal Surrogate	Contact #(s): Jennifer [phone]  Other Decision-Maker (I.e., HCA or Surrogate) Aware of: Diagnosis: Yes   Prognosis: Yes  Other Stakeholders: Aminta (girlfriend), Stephanie ( wife)  Evidence of Patient’s Preference of Life Sustaining Treatment (Written or Oral): None  Resuscitation status: DNR: No	DNI: No       Discussions:   Discussion with JENY Montenegro MA (Ethics Fellow) and JODIE RICHARDSON (Neuro ICU) 11/12/2023: Cased and social complexities discussed in detail. JODIE Silveira explained that at this time there are no decisions to be made but they may need decisions regarding dialysis in the coming days, and the team needs help identifying the appropriate surrogate.    Discussion with JENY Montenegro MA, LATANYA RICHARDSON (Neuro ICU) and HARI Peters MD (Neuro ICU Attending) 11/13/2023: Case and social complexities discussed in detail, and they expressed the need to determine an appropriate surrogate decision maker for the patient as they need consent for blood products.    Discussion with JENY Montenegro MA (Ethics Fellow) and Aminta (patient’s girlfriend) 11/13/2023: The fellow met with Aminta at bedside and introduced herself and the role of ethics. Aminta explained she has been with the patient for 35 years and lived together for most of that time. However, the patient's daughters make medical decisions for him, because that is what he decided on during a prior admission to [facility].    Discussion with JENY Montenegro MA (Ethics Fellow) Jennifer (patient’s daughter) 11/13/2023: The fellow called Jennifer and introduced herself and the role of ethics. Jennifer confirmed that the patient completed a HCP form during a past admission to [facility]. Jennifer stated she will look for the form before she comes to visit the patient today.     Chart Review on 11/13/2023 by JENY Montenegro MA found the HCP Form completed on 2/18/2018. The patient did name his two daughters Jennifer and Micaela as the primary HCP and his sister María Elena as the alternate HCP, however, the form was not witnessed by two people and therefore is not valid.     Discussion with JENY Montenegro MA and Jennifer (patient’s daughter) 11/13/2023: The fellow met Jennifer at bedside and explained that the old HCP form is not valid. Jennifer explained that the patient is still legally  but has been estranged from her mother for around 30 years with no contact. JENY Montenegro MA explained that given the patient is estranged with no contact from his wife there is potential conflict of interest with her as the surrogate, so Jennifer and Micaela would be the patient's surrogates per the Family Health Care Decisions Act (2010). Jennifer expressed understanding and appreciation for the help. Jennifer asked if her father could complete a new Health Care Proxy Form when he gets better, and the fellow explained that if the patient gets to a point where the team feels he can complete one, they can ask the  for a new form.       Bioethics analysis: The central ethical issue in this case is patient autonomy.     The conflict in this case is one that health care providers often face when caring for patients who experience changes to their mental capacity through the course of their hospital stay. The principle of respect for autonomous persons acknowledges a patient’s right to hold views, to make choices and to take actions based on their values and beliefs. (i) Furthermore, this principle acknowledges the patient’s dignity and bodily integrity. Essential to this principle is the capacity for autonomous choice. (i) In other words, the patient’s ability to decide what can and cannot be done to him according to his set of values. To preserve and empower this ability to make choices, some patients decide beforehand what treatments they would and would not like to be done to them and identify an individual to act according to those wishes. When a patient does not have the ability to make decisions for himself, we should protect his autonomy by finding an appropriate surrogate decision maker.    Under [place] law, patients can appoint a health care proxy to make “any and all health care decisions on the principal’s behalf that the principal could make.” (ii) This, however, is subject to specific limitations. The health care proxy shall make medical decisions “in accordance with the principal’s wishes, including the principal’s Religion and moral beliefs.” (iii) If the patient’s wishes are unknown, then a health care agent can use their substituted judgment to make decisions in accordance with the patient’s best interests. (iv)    Under [facility] Health Care Proxy Law (ii), a competent adult may appoint a health care agent by a health care proxy, signed and dated by the adult in the presence of two adult witnesses who shall also sign the proxy. Another person may sign and date the health care proxy for the adult if the adult is unable to do so, at the adult's direction and in the adult's presence, and in the presence of two adult witnesses who shall sign the proxy (iii). The witnesses shall state that the principal appeared to execute the proxy willingly and free from duress (ii). The person appointed as agent shall not act as witness to execution of the health care proxy (ii). The agent's authority shall commence upon a determination that the principal lacks capacity to make health care decisions (ii).    In this case the patient did complete a Health Care Proxy on 2/18/2018 while admitted to another [facility] naming his two daughters as the primary health care agents. However, this form is not valid as it was not witnessed by two individuals.     Because there is no valid Health Care Proxy for the patient, we rely on the [facility] Family Health Care Decision Act (CDA). The CDA addresses the situation of a sick individual who lacks capacity, has no guardian or health care agent, but has an available surrogate.(v) The ethical standard the surrogate is supposed to apply to such decision-making is, first “substituted judgment” –based on the patient’s prior articulation of preferences for such a situation– or second “best interests”. The CDA establishes a hierarchy of surrogacy, an order of priority of decision makers for the incapable patient:    • a court-appointed guardian;  • the spouse or domestic partner as defined in the CDA ([facility] § 2994-a);  • adult children;  • a parent;  • a brother or sister; or  • a close friend.(iii)    One person from the list must be reasonably available, willing, and competent to act.(vi) Such person shall be the surrogate provided no other person in a class higher in priority than the person designated is available.(vi) Also, that person may designate any other person on the list to be surrogate, provided no one in a class higher in priority than the person designated objects.(vi)    In this case, the nuances of the family relationships are difficult, and a hierarchal list of surrogates cannot be discharged in such a stereotypical manner. The patient is legally  yet estranged from his wife for 30 years, so his estranged wife may not be suitable as the primary surrogate because of possible conflict of interest. Surrogates with potential motivation for disregarding a patient’s best interest or substituted judgement may be set aside in favor of consensus of other appropriate surrogates. The patient’s girlfriend cannot be a domestic partner because he is legally , so her hierarchical relationship would be described as a close friend under the [facility]. The patient does have two adult daughters suitable for surrogacy, whom he did indicate in the past he would want to make decisions for him, as demonstrated by the invalid health care proxy form.    Consult requested by: JODIE RICHARDSON     Role: Physician Assistant		Service: Neuroscience ICU	  Attending: HARI Peters MD, Neuro Critical Care	  Consultant: Joi Montenegro MA (Ethics Fellow) Dory Damon MD, MS, MPH, [facility] (Ethics Attending) Contact #s: [phone] (Fellow Cell) [phone] (Office)    CONSULT PURPOSE: To assist in the ethical dilemma of an 83-year-old male admitted for intraparenchymal hemorrhage, subdural hematoma and subarachnoid hemorrhage, regarding surrogacy.    CLINICAL SUMMARY (SEE NOTE 11/13/2023): This is an 83-year-old male with a history of hypertension, coronary artery disease status post stents, renal cell carcinoma status post left nephrectomy, bladder cancer, benign prostatic hyperplasia, congestive heart failure, left bundle branch block, vertigo, hyperlipidemia, 5.5 cm abdominal aortic aneurysm without rupture status post endovascular aneurysm repair, paroxysmal atrial fibrillation on Eliquis, Plavix, and aspirin, and early stage Alzheimer dementia who presented to [facility] on 11/10/2023 after being sent from urgent care, where they stapled his head laceration, because of an unwitnessed fall with head strike and the patient is on blood thinners. In the Emergency Department at [facility] a head CT revealed the patient to have a right front intraparenchymal hemorrhage (IPH), subdural hematoma (SDH) and subarachnoid hemorrhage (SAH), so the patient was subsequently transferred to [facility] for higher level of care. Once the patient arrived at [facility], the patient was noted to have a Joleen Coma Scale of 15, the initial exam revealed left sided facial droop and left sided weakness. The patient was noted to be vomiting while on route to the CT scanner and was intubated for airway protection. Neurosurgery evaluated the patient on 11/10/2023 and noted that further imaging was needed before determining a management plan. The repeat head CT revealed large parenchymal hemorrhage centered in the right frontal lobe resulting in a midline shift to the left of approximately 0.5cm, so neurosurgery performed a right decompressive craniectomy with Roshan Moreno drain placement and then the patient was brought to the Neuro ICU for further management. On 11/11/2023, the Neuro ICU attending noted the patient with an acute kidney injury (DONNIE) with a creatinine of 2.63. Nephrology was consulted and saw the patient on 11/11/2023 and noted the DONNIE was likely contrast induced nephropathy from the imaging contrast and that the patient has a history of DONNIE after contrast that briefly required dialysis, and they recommended bicarbonate by mouth, avoiding further nephrotoxins and close monitoring, dialysis was not required at that time. The patient had an echocardiogram on 11/11/2023 which showed a moderately calcified aortic valve with mobile echo density poorly visualized that may represent artifact versus mobile calcification versus vegetation. Cardiology saw the patient on 11/12/2023 to rule out endocarditis and noted that due to the echocardiogram results and that the patient is febrile and has leukocytosis, so that they will consider a transesophageal echocardiogram, if the blood cultures come back positive. On 11/12/2023, the Neuro ICU attending noted the patient with fevers that may be central therefore they are holding antibiotics unless there is indication of infection. Nephrology noted that the patient’s serum Creatinine had peaked at 4 and is now improving (down to 2.98) and recommended continuing to avoid nephrotoxic agents. Cardiology noted the patient’s initial blood culture on 11/11/2023 was negative, awaiting further blood cultures results from 11/12/2023 and/or 11/13/2023 and if positive they recommended a WHIT to confirm vegetation on the aortic valve as it would change the antibiotic treatment. On 11/13/2023, the Neuro ICU Attending noted the patient with improved white blood cell count and that the patient was extubated (patient was extubated at 1600 per respiratory flow sheet). Neurosurgery, Nephrology and Cardiology on consult. Ethics consult initiated to assist with surrogacy given social complexities.  			  Prognosis Estimate (survival in hrs, days, wks, mos, yrs): poor – per the team  Patient Decision-Making Capacity: Lacks Capacity – patient is intubated and opens eyes to name and answers some yes/no questions.   Patient Aware of: Diagnosis: Unknown	Prognosis: Unknown  Name of medical decision-maker should patient lack capacity (relationship): Jennifer, daughter and point person for children and Angela, Daughter [phone]) (Children are equal)  Role: Legal Surrogate	Contact #(s): Jennifer [phone]  Other Decision-Maker (I.e., HCA or Surrogate) Aware of: Diagnosis: Yes   Prognosis: Yes  Other Stakeholders: Aminta (girlfriend), Stephanie ( wife)  Evidence of Patient’s Preference of Life Sustaining Treatment (Written or Oral): None  Resuscitation status: DNR: No	DNI: No       Discussions:   Discussion with JENY Montenegro MA (Ethics Fellow) and JODIE RICHARDSON (Neuro ICU) 11/12/2023: Cased and social complexities discussed in detail. JODIE Silveira explained that at this time there are no decisions to be made but they may need decisions regarding dialysis in the coming days, and the team needs help identifying the appropriate surrogate.    Discussion with JENY Montenegro MA, LATANYA RICHARDSON (Neuro ICU) and HARI Peters MD (Neuro ICU Attending) 11/13/2023: Case and social complexities discussed in detail, and they expressed the need to determine an appropriate surrogate decision maker for the patient as they need consent for blood products.    Discussion with JENY Montenegro MA (Ethics Fellow) and Aminta (patient’s girlfriend) 11/13/2023: The fellow met with Aminta at bedside and introduced herself and the role of ethics. Aminta explained she has been with the patient for 35 years and lived together for most of that time. However, the patient's daughters make medical decisions for him, because that is what he decided on during a prior admission to [facility].    Discussion with JENY Montenegro MA (Ethics Fellow) Jennifer (patient’s daughter) 11/13/2023: The fellow called Jennifer and introduced herself and the role of ethics. Jennifer confirmed that the patient completed a HCP form during a past admission to [facility]. Jennifer stated she will look for the form before she comes to visit the patient today.     Chart Review on 11/13/2023 by JENY Montenegro MA found the HCP Form completed on 2/18/2018. The patient did name his two daughters Jennifer and Micaela as the primary HCP and his sister María Elena as the alternate HCP, however, the form was not witnessed by two people and therefore is not valid.     Discussion with JENY Montenegro MA and Jennifer (patient’s daughter) 11/13/2023: The fellow met Jennifer at bedside and explained that the old HCP form is not valid. Jennifer explained that the patient is still legally  but has been estranged from her mother for around 30 years with no contact. JENY Montenegro MA explained that given the patient is estranged with no contact from his wife there is potential conflict of interest with her as the surrogate, so Jennifer and Micaela would be the patient's surrogates per the Family Health Care Decisions Act (2010). Jennifer expressed understanding and appreciation for the help. Jennifer asked if her father could complete a new Health Care Proxy Form when he gets better, and the fellow explained that if the patient gets to a point where the team feels he can complete one, they can ask the  for a new form.       Bioethics analysis: The central ethical issue in this case is patient autonomy.     The conflict in this case is one that health care providers often face when caring for patients who experience changes to their mental capacity through the course of their hospital stay. The principle of respect for autonomous persons acknowledges a patient’s right to hold views, to make choices and to take actions based on their values and beliefs. (i) Furthermore, this principle acknowledges the patient’s dignity and bodily integrity. Essential to this principle is the capacity for autonomous choice. (i) In other words, the patient’s ability to decide what can and cannot be done to him according to his set of values. To preserve and empower this ability to make choices, some patients decide beforehand what treatments they would and would not like to be done to them and identify an individual to act according to those wishes. When a patient does not have the ability to make decisions for himself, we should protect his autonomy by finding an appropriate surrogate decision maker.    Under [place] law, patients can appoint a health care proxy to make “any and all health care decisions on the principal’s behalf that the principal could make.” (ii) This, however, is subject to specific limitations. The health care proxy shall make medical decisions “in accordance with the principal’s wishes, including the principal’s Lutheran and moral beliefs.” (iii) If the patient’s wishes are unknown, then a health care agent can use their substituted judgment to make decisions in accordance with the patient’s best interests. (iv)    Under [facility] Health Care Proxy Law (ii), a competent adult may appoint a health care agent by a health care proxy, signed and dated by the adult in the presence of two adult witnesses who shall also sign the proxy. Another person may sign and date the health care proxy for the adult if the adult is unable to do so, at the adult's direction and in the adult's presence, and in the presence of two adult witnesses who shall sign the proxy (iii). The witnesses shall state that the principal appeared to execute the proxy willingly and free from duress (ii). The person appointed as agent shall not act as witness to execution of the health care proxy (ii). The agent's authority shall commence upon a determination that the principal lacks capacity to make health care decisions (ii).    In this case the patient did complete a Health Care Proxy on 2/18/2018 while admitted to another [facility] naming his two daughters as the primary health care agents. However, this form is not valid as it was not witnessed by two individuals.     Because there is no valid Health Care Proxy for the patient, we rely on the [facility] Family Health Care Decision Act (CDA). The CDA addresses the situation of a sick individual who lacks capacity, has no guardian or health care agent, but has an available surrogate.(v) The ethical standard the surrogate is supposed to apply to such decision-making is, first “substituted judgment” –based on the patient’s prior articulation of preferences for such a situation– or second “best interests”. The CDA establishes a hierarchy of surrogacy, an order of priority of decision makers for the incapable patient:    • a court-appointed guardian;  • the spouse or domestic partner as defined in the CDA ([facility] § 2994-a);  • adult children;  • a parent;  • a brother or sister; or  • a close friend.(iii)    One person from the list must be reasonably available, willing, and competent to act.(vi) Such person shall be the surrogate provided no other person in a class higher in priority than the person designated is available.(vi) Also, that person may designate any other person on the list to be surrogate, provided no one in a class higher in priority than the person designated objects.(vi)    In this case, the nuances of the family relationships are difficult, and a hierarchal list of surrogates cannot be discharged in such a stereotypical manner. The patient is legally  yet estranged from his wife for 30 years, so his estranged wife may not be suitable as the primary surrogate because of possible conflict of interest. Surrogates with potential motivation for disregarding a patient’s best interest or substituted judgement may be set aside in favor of consensus of other appropriate surrogates. The patient’s girlfriend cannot be a domestic partner because he is legally , so her hierarchical relationship would be described as a close friend under the [facility]. The patient does have two adult daughters suitable for surrogacy, whom he did indicate in the past he would want to make decisions for him, as demonstrated by the invalid health care proxy form.

## 2023-11-16 NOTE — CONSULT NOTE ADULT - ASSESSMENT
RECOMMENDATION After due diligence, the patient’s surrogates are his daughters Jennifer and Angela who are reasonably available, willing, and competent to act. The team should engage with them for medical decision making. The patient’s daughters have decided to appoint Jennifer as the point person for the team.    [facility] law and current ethical thinking support the right of a patient’s surrogate decision maker to make medical decisions on the patient’s behalf when the patient is without capacity.    Thank you for including the Ethics Consultation Service. Ethics commends the team for their virtue in the care and support for Mr. Fuchs. Ethics will remain available for any discussions and decision points that may occur.    ADDENDEM 11/16/2023: JENY Montenegro MA (Ethics Fellow) met with the patient’s daughter Angela and estranged wife Stephanie, who were at bedside visiting the patient. The fellow introduced herself and the role of ethics to Angela and Stephanie. Stephanie explained that she is still the patient’s legal wife, but they have been estranged for a long time. She stated that she is in agreement with her daughters making decisions for the patient as that is what he expressed in the past and she is not close with him. Stephanie explained that she came to visit the patient because her daughters told her he was in the hospital and very sick and it is his birthday, so she felt she should come to see him.    CASE DISCUSSED WITH MEDICAL ETHICIST NUZHAT ALVARADO MD, MS, MPH, [facility]  OF MEDICAL ETHICS    MORE THAN 50% OF THE TIME OF THIS CONSULTATION WAS SPENT IN COORDINATION OF CARE OF PATIENT    References  (i) Min TL & Oriana JF. Principles of Biomedical Ethics. [place]: [employer]; 2019  (ii) [place] 2982.1 (2020).  (iii) [place] 2982.2 (2020).  (iv) Id.  (v) [place] Pub[place] § 2994-a et seq. (2020).  (vi) [place] Pub[place] 2994-d.1.     RECOMMENDATION After due diligence, the patient’s surrogates are his daughters Jennifer and Angela who are reasonably available, willing, and competent to act. The team should engage with them for medical decision making. The patient’s daughters have decided to appoint Jennifer as the point person for the team.    [facility] law and current ethical thinking support the right of a patient’s surrogate decision maker to make medical decisions on the patient’s behalf when the patient is without capacity.    Thank you for including the Ethics Consultation Service. Ethics commends the team for their virtue in the care and support for Mr. Fuchs. Ethics will remain available for any discussions and decision points that may occur.    ADDENDEM 11/16/2023: JENY Montenegro MA (Ethics Fellow) met with the patient’s daughter Angela and estranged wife Stephanie, who were at bedside visiting the patient. The fellow introduced herself and the role of ethics to Angela and Stephanie. Stephanie explained that she is still the patient’s legal wife, but they have been estranged for a long time. She stated that she is in agreement with her daughters making decisions for the patient as that is what he expressed in the past and she is not close with him. Stephanie explained that she came to visit the patient because her daughters told her he was in the hospital and very sick and it is his birthday, so she felt she should come to see him.    CASE DISCUSSED WITH MEDICAL ETHICIST NUZHAT ALVARADO MD, MS, MPH, [facility]  OF MEDICAL ETHICS    MORE THAN 50% OF THE TIME OF THIS CONSULTATION WAS SPENT IN COORDINATION OF CARE OF PATIENT    References  (i) Min TL & Oriana JF. Principles of Biomedical Ethics. [place]: [employer]; 2019  (ii) [place] 2982.1 (2020).  (iii) [place] 2982.2 (2020).  (iv) Id.  (v) [place] Pub[place] § 2994-a et seq. (2020).  (vi) [place] Pub[place] § 2994-d.1.

## 2023-11-16 NOTE — PROGRESS NOTE ADULT - SUBJECTIVE AND OBJECTIVE BOX
HPI:  82y M with PMH HTN, CAD s/p stents, renal cell carcinoma status post left nephrectomy, bladder CA, BPH, CHF, LBBB, vertigo,  HLD, 5.5cm AAA without rupture post EVAR, paroxysmal A-fib on Eliquis, Plavix, and aspirin, early stage Alzheimer dementia transferred from [facility] s/p unwitnessed fall with head strike at home found by wife on floor at 8am. Patient brought to urgent care by wife and had 5 staples to posterior scalp but was instructed to go to nearest ED.  CT head at [place] showed R frontal IPH, SDH, SAH at 9:00. CTA stroke protocol not performed at [facility]. Patient BIB on 6L NC.  Pt GCS 15 on arrival, A&Ox2 on arrival. After initial assessment, patient noted to be vomiting enroute to CT scanner (10 Nov 2023 11:04)    INTERVAL HPI/OVERNIGHT EVENTS:  82y Male minimal increased WOB throughout night on NC    ICU Vital Signs Last 24 Hrs  T(C): 37.4 (16 Nov 2023 04:00), Max: 38.1 (15 Nov 2023 12:00)  T(F): 99.3 (16 Nov 2023 04:00), Max: 100.6 (15 Nov 2023 12:00)  HR: 80 (16 Nov 2023 04:25) (79 - 119)  BP: 160/83 (16 Nov 2023 04:00) (134/66 - 191/97)  BP(mean): 104 (16 Nov 2023 04:00) (87 - 144)  ABP: --  ABP(mean): --  RR: 18 (16 Nov 2023 04:25) (16 - 29)  SpO2: 99% (16 Nov 2023 04:25) (91% - 100%)    O2 Parameters below as of 16 Nov 2023 04:25  Patient On (Oxygen Delivery Method): nasal cannula, high flow  O2 Flow (L/min): 40  O2 Concentration (%): 40      PHYSICAL EXAM:  GENERAL: No acute distress, well-developed  HEAD:  Atraumatic, Normocephalic  EYES: EOMI, PERRLA, conjunctiva and sclera clear  NECK: Supple, no lymphadenopathy, no JVD  CHEST/LUNG: Lung sounds clear, diminished at the bases   HEART: Regular rate and rhythm.   ABDOMEN: Soft, non-tender, non-distended; normal bowel sounds, no organomegaly  EXTREMITIES:  2+ peripheral pulses b/l, No clubbing, cyanosis, or edema  NEUROLOGY: A&O x 1, lt upper extremity and lt lower extremity weakess.   SKIN: No rashes or lesions    LABS:                                   8.8    11.02 )-----------( 217      ( 16 Nov 2023 02:30 )             26.6   11-16    151<H>  |  114<H>  |  48.9<H>  ----------------------------<  141<H>  3.6   |  23.0  |  1.90<H>    Ca    8.9      16 Nov 2023 02:30  Phos  2.9     11-16  Mg     2.4     11-16    TPro  6.6  /  Alb  3.4  /  TBili  0.7  /  DBili  0.2  /  AST  20  /  ALT  13  /  AlkPhos  75  11-14      Urinalysis Basic - ( 13 Nov 2023 18:14 )    Color: x / Appearance: x / SG: x / pH: x  Gluc: 119 mg/dL / Ketone: x  / Bili: x / Urobili: x   Blood: x / Protein: x / Nitrite: x   Leuk Esterase: x / RBC: x / WBC x   Sq Epi: x / Non Sq Epi: x / Bacteria: x        I&O's Summary    14 Nov 2023 07:01  -  15 Nov 2023 07:00  --------------------------------------------------------  IN: 895 mL / OUT: 1830 mL / NET: -935 mL    15 Nov 2023 07:01  -  16 Nov 2023 05:22  --------------------------------------------------------  IN: 1460 mL / OUT: 3260 mL / NET: -1800 mL

## 2023-11-16 NOTE — PROGRESS NOTE ADULT - ASSESSMENT
82y M with TBI - R frontal IPH, SDH, tSAH; s/p R decompressive hemicraniectomy 11/10.  Respiratory distress due to impaired bulbar function with difficulty managing secretions, aspiration PNA.  PMH of CAD s/p stents, CHF; HTN, HLD. AAA post EVAR. Parox. A.Fib, on Cardizem. Not on AC now in view of TBI.  PMH of renal cell carcinoma status post left nephrectomy, bladder CA, BPH  Nonoliguric DONNIE, likely ROXANNE, critical illness.  AV vegetation on TTE - unclear etiology. BCx negative.  Hypernatremia.     Plan:  neurochecks, protected sleep time  monitor resp status, started on HFNC with good response (less WOB, more awake, interacting), low threshold for intubation  cont nebs + Mucomyst, aggressive chest PT, suctioning   cont Diltiazem for rate control and HTN, increase to 60 q6hrs, maintain -150, HR<120  restart ASA 81 daily  cont TF 10 cc/hr for now; ulcer ppx, BM regimen  monitor e-lytes and UOP; dose of Lasix fo rsigns of pulm congestion;  cont D5W, BMP q8hrs  cont Cefepime for Klebs tracheobronchitis vs aspiration PNA, renal dose  check RVP/COVID  SCDs, cannot start ppx AC as with h/o HIT, high risk of bleeding with TBI, impaired renal function CrCl 31  BL LE Dopplers     82y M with TBI - R frontal IPH, SDH, tSAH; s/p R decompressive hemicraniectomy 11/10.  Respiratory distress due to impaired bulbar function with difficulty managing secretions, aspiration PNA.  PMH of CAD s/p stents, CHF; HTN, HLD. AAA post EVAR. Parox. A.Fib, on Cardizem. Not on AC now in view of TBI.  PMH of renal cell carcinoma status post left nephrectomy, bladder CA, BPH  Nonoliguric DONNIE, likely ROXANNE, critical illness.  AV vegetation on TTE - unclear etiology. BCx negative.  Hypernatremia.   H/o HIT.    Plan:  neurochecks, protected sleep time  start cEEG  monitor resp status, started on HFNC with good response (less WOB, more awake, interacting), low threshold for intubation  cont nebs + Mucomyst, aggressive chest PT, suctioning   cont Diltiazem for rate control and HTN, increase to 90 q8hrs, d/c Coreg; maintain -150, HR<120  restart ASA 81 daily  cont TF 10 cc/hr for now; ulcer ppx, BM regimen  monitor e-lytes and UOP; dose of Lasix fo rsigns of pulm congestion;  incr D5W, BMP q12hrs; d/c HCO3 pills  cont Cefepime for Klebs tracheobronchitis vs aspiration PNA, renal dose  check RVP/COVID  SCDs, cannot start ppx AC as with h/o HIT, high risk of bleeding with TBI, impaired renal function CrCl 31 - re-eval in am for Fondaparinux  BL LE Dopplers

## 2023-11-16 NOTE — PROGRESS NOTE ADULT - NS ATTEND AMEND GEN_ALL_CORE FT
Neurosurgery Attending Attestation:    Patient seen and examined at bedside. Agree with plan and note as documented above.    82 y/o M s/p R craniectomy for ICH evacuation 11/10/2023, now s/p extubation, oriented to self, not hospital or year, FC in RUE and RLE. Trace movement in LUE and LLE. Continue monitoring for respiratory concerns. Care per NCCU.    -Marty Larios MD

## 2023-11-16 NOTE — PROGRESS NOTE ADULT - ASSESSMENT
81 yo male POD#4 s/p a right decompressive hemicraniectomy by Dr. Larios    -Neurochecks q1  -Pain Control, avoid over sedation  -Goal SBP 90 to 160  -Monitor for Airway protection  -Keppra for seizure ppx  -Keep HOB < 30 degrees  -NS at 60 mL/hr  -DG this AM if resp issues do not flair up.  -Plan of care to be discussed in AM with neurosurgery attending

## 2023-11-17 LAB
ALBUMIN SERPL ELPH-MCNC: 3.1 G/DL — LOW (ref 3.3–5.2)
ALBUMIN SERPL ELPH-MCNC: 3.1 G/DL — LOW (ref 3.3–5.2)
ALP SERPL-CCNC: 69 U/L — SIGNIFICANT CHANGE UP (ref 40–120)
ALP SERPL-CCNC: 69 U/L — SIGNIFICANT CHANGE UP (ref 40–120)
ALT FLD-CCNC: 13 U/L — SIGNIFICANT CHANGE UP
ALT FLD-CCNC: 13 U/L — SIGNIFICANT CHANGE UP
AMMONIA BLD-MCNC: 17 UMOL/L — SIGNIFICANT CHANGE UP (ref 11–55)
AMMONIA BLD-MCNC: 17 UMOL/L — SIGNIFICANT CHANGE UP (ref 11–55)
ANION GAP SERPL CALC-SCNC: 11 MMOL/L — SIGNIFICANT CHANGE UP (ref 5–17)
ANION GAP SERPL CALC-SCNC: 13 MMOL/L — SIGNIFICANT CHANGE UP (ref 5–17)
ANION GAP SERPL CALC-SCNC: 13 MMOL/L — SIGNIFICANT CHANGE UP (ref 5–17)
APPEARANCE UR: CLEAR — SIGNIFICANT CHANGE UP
APPEARANCE UR: CLEAR — SIGNIFICANT CHANGE UP
AST SERPL-CCNC: 15 U/L — SIGNIFICANT CHANGE UP
AST SERPL-CCNC: 15 U/L — SIGNIFICANT CHANGE UP
BACTERIA # UR AUTO: NEGATIVE /HPF — SIGNIFICANT CHANGE UP
BACTERIA # UR AUTO: NEGATIVE /HPF — SIGNIFICANT CHANGE UP
BASE EXCESS BLDA CALC-SCNC: 3.2 MMOL/L — HIGH (ref -2–3)
BASE EXCESS BLDA CALC-SCNC: 3.2 MMOL/L — HIGH (ref -2–3)
BILIRUB DIRECT SERPL-MCNC: 0.2 MG/DL — SIGNIFICANT CHANGE UP (ref 0–0.3)
BILIRUB DIRECT SERPL-MCNC: 0.2 MG/DL — SIGNIFICANT CHANGE UP (ref 0–0.3)
BILIRUB INDIRECT FLD-MCNC: 0.5 MG/DL — SIGNIFICANT CHANGE UP (ref 0.2–1)
BILIRUB INDIRECT FLD-MCNC: 0.5 MG/DL — SIGNIFICANT CHANGE UP (ref 0.2–1)
BILIRUB SERPL-MCNC: 0.7 MG/DL — SIGNIFICANT CHANGE UP (ref 0.4–2)
BILIRUB SERPL-MCNC: 0.7 MG/DL — SIGNIFICANT CHANGE UP (ref 0.4–2)
BILIRUB UR-MCNC: NEGATIVE — SIGNIFICANT CHANGE UP
BILIRUB UR-MCNC: NEGATIVE — SIGNIFICANT CHANGE UP
BLOOD GAS COMMENTS ARTERIAL: SIGNIFICANT CHANGE UP
BLOOD GAS COMMENTS ARTERIAL: SIGNIFICANT CHANGE UP
BUN SERPL-MCNC: 45.1 MG/DL — HIGH (ref 8–20)
BUN SERPL-MCNC: 45.1 MG/DL — HIGH (ref 8–20)
BUN SERPL-MCNC: 46.1 MG/DL — HIGH (ref 8–20)
BUN SERPL-MCNC: 46.1 MG/DL — HIGH (ref 8–20)
BUN SERPL-MCNC: 47 MG/DL — HIGH (ref 8–20)
BUN SERPL-MCNC: 47 MG/DL — HIGH (ref 8–20)
CALCIUM SERPL-MCNC: 8.8 MG/DL — SIGNIFICANT CHANGE UP (ref 8.4–10.5)
CAST: 6 /LPF — HIGH (ref 0–4)
CAST: 6 /LPF — HIGH (ref 0–4)
CHLORIDE SERPL-SCNC: 111 MMOL/L — HIGH (ref 96–108)
CHLORIDE SERPL-SCNC: 111 MMOL/L — HIGH (ref 96–108)
CHLORIDE SERPL-SCNC: 113 MMOL/L — HIGH (ref 96–108)
CO2 SERPL-SCNC: 23 MMOL/L — SIGNIFICANT CHANGE UP (ref 22–29)
CO2 SERPL-SCNC: 23 MMOL/L — SIGNIFICANT CHANGE UP (ref 22–29)
CO2 SERPL-SCNC: 24 MMOL/L — SIGNIFICANT CHANGE UP (ref 22–29)
COLOR SPEC: YELLOW — SIGNIFICANT CHANGE UP
COLOR SPEC: YELLOW — SIGNIFICANT CHANGE UP
CREAT SERPL-MCNC: 1.65 MG/DL — HIGH (ref 0.5–1.3)
CREAT SERPL-MCNC: 1.65 MG/DL — HIGH (ref 0.5–1.3)
CREAT SERPL-MCNC: 1.68 MG/DL — HIGH (ref 0.5–1.3)
CREAT SERPL-MCNC: 1.68 MG/DL — HIGH (ref 0.5–1.3)
CREAT SERPL-MCNC: 1.72 MG/DL — HIGH (ref 0.5–1.3)
CREAT SERPL-MCNC: 1.72 MG/DL — HIGH (ref 0.5–1.3)
DIFF PNL FLD: ABNORMAL
DIFF PNL FLD: ABNORMAL
EGFR: 39 ML/MIN/1.73M2 — LOW
EGFR: 39 ML/MIN/1.73M2 — LOW
EGFR: 40 ML/MIN/1.73M2 — LOW
EGFR: 40 ML/MIN/1.73M2 — LOW
EGFR: 41 ML/MIN/1.73M2 — LOW
EGFR: 41 ML/MIN/1.73M2 — LOW
FOLATE SERPL-MCNC: 6.7 NG/ML — SIGNIFICANT CHANGE UP
FOLATE SERPL-MCNC: 6.7 NG/ML — SIGNIFICANT CHANGE UP
GAS PNL BLDA: SIGNIFICANT CHANGE UP
GAS PNL BLDA: SIGNIFICANT CHANGE UP
GLUCOSE SERPL-MCNC: 122 MG/DL — HIGH (ref 70–99)
GLUCOSE SERPL-MCNC: 122 MG/DL — HIGH (ref 70–99)
GLUCOSE SERPL-MCNC: 129 MG/DL — HIGH (ref 70–99)
GLUCOSE SERPL-MCNC: 129 MG/DL — HIGH (ref 70–99)
GLUCOSE SERPL-MCNC: 141 MG/DL — HIGH (ref 70–99)
GLUCOSE SERPL-MCNC: 141 MG/DL — HIGH (ref 70–99)
GLUCOSE UR QL: NEGATIVE MG/DL — SIGNIFICANT CHANGE UP
GLUCOSE UR QL: NEGATIVE MG/DL — SIGNIFICANT CHANGE UP
HCO3 BLDA-SCNC: 27 MMOL/L — SIGNIFICANT CHANGE UP (ref 21–28)
HCO3 BLDA-SCNC: 27 MMOL/L — SIGNIFICANT CHANGE UP (ref 21–28)
HCT VFR BLD CALC: 26 % — LOW (ref 39–50)
HCT VFR BLD CALC: 26 % — LOW (ref 39–50)
HGB BLD-MCNC: 8.4 G/DL — LOW (ref 13–17)
HGB BLD-MCNC: 8.4 G/DL — LOW (ref 13–17)
HOROWITZ INDEX BLDA+IHG-RTO: SIGNIFICANT CHANGE UP
HOROWITZ INDEX BLDA+IHG-RTO: SIGNIFICANT CHANGE UP
KETONES UR-MCNC: NEGATIVE MG/DL — SIGNIFICANT CHANGE UP
KETONES UR-MCNC: NEGATIVE MG/DL — SIGNIFICANT CHANGE UP
LEUKOCYTE ESTERASE UR-ACNC: NEGATIVE — SIGNIFICANT CHANGE UP
LEUKOCYTE ESTERASE UR-ACNC: NEGATIVE — SIGNIFICANT CHANGE UP
MAGNESIUM SERPL-MCNC: 2.3 MG/DL — SIGNIFICANT CHANGE UP (ref 1.6–2.6)
MAGNESIUM SERPL-MCNC: 2.3 MG/DL — SIGNIFICANT CHANGE UP (ref 1.6–2.6)
MCHC RBC-ENTMCNC: 30.9 PG — SIGNIFICANT CHANGE UP (ref 27–34)
MCHC RBC-ENTMCNC: 30.9 PG — SIGNIFICANT CHANGE UP (ref 27–34)
MCHC RBC-ENTMCNC: 32.3 GM/DL — SIGNIFICANT CHANGE UP (ref 32–36)
MCHC RBC-ENTMCNC: 32.3 GM/DL — SIGNIFICANT CHANGE UP (ref 32–36)
MCV RBC AUTO: 95.6 FL — SIGNIFICANT CHANGE UP (ref 80–100)
MCV RBC AUTO: 95.6 FL — SIGNIFICANT CHANGE UP (ref 80–100)
NITRITE UR-MCNC: NEGATIVE — SIGNIFICANT CHANGE UP
NITRITE UR-MCNC: NEGATIVE — SIGNIFICANT CHANGE UP
PCO2 BLDA: 38 MMHG — SIGNIFICANT CHANGE UP (ref 35–48)
PCO2 BLDA: 38 MMHG — SIGNIFICANT CHANGE UP (ref 35–48)
PH BLDA: 7.46 — HIGH (ref 7.35–7.45)
PH BLDA: 7.46 — HIGH (ref 7.35–7.45)
PH UR: 6 — SIGNIFICANT CHANGE UP (ref 5–8)
PH UR: 6 — SIGNIFICANT CHANGE UP (ref 5–8)
PHOSPHATE SERPL-MCNC: 3.7 MG/DL — SIGNIFICANT CHANGE UP (ref 2.4–4.7)
PHOSPHATE SERPL-MCNC: 3.7 MG/DL — SIGNIFICANT CHANGE UP (ref 2.4–4.7)
PLATELET # BLD AUTO: 237 K/UL — SIGNIFICANT CHANGE UP (ref 150–400)
PLATELET # BLD AUTO: 237 K/UL — SIGNIFICANT CHANGE UP (ref 150–400)
PO2 BLDA: 62 MMHG — LOW (ref 83–108)
PO2 BLDA: 62 MMHG — LOW (ref 83–108)
POTASSIUM SERPL-MCNC: 3.9 MMOL/L — SIGNIFICANT CHANGE UP (ref 3.5–5.3)
POTASSIUM SERPL-MCNC: 3.9 MMOL/L — SIGNIFICANT CHANGE UP (ref 3.5–5.3)
POTASSIUM SERPL-MCNC: 4 MMOL/L — SIGNIFICANT CHANGE UP (ref 3.5–5.3)
POTASSIUM SERPL-MCNC: 4 MMOL/L — SIGNIFICANT CHANGE UP (ref 3.5–5.3)
POTASSIUM SERPL-MCNC: 4.4 MMOL/L — SIGNIFICANT CHANGE UP (ref 3.5–5.3)
POTASSIUM SERPL-MCNC: 4.4 MMOL/L — SIGNIFICANT CHANGE UP (ref 3.5–5.3)
POTASSIUM SERPL-SCNC: 3.9 MMOL/L — SIGNIFICANT CHANGE UP (ref 3.5–5.3)
POTASSIUM SERPL-SCNC: 3.9 MMOL/L — SIGNIFICANT CHANGE UP (ref 3.5–5.3)
POTASSIUM SERPL-SCNC: 4 MMOL/L — SIGNIFICANT CHANGE UP (ref 3.5–5.3)
POTASSIUM SERPL-SCNC: 4 MMOL/L — SIGNIFICANT CHANGE UP (ref 3.5–5.3)
POTASSIUM SERPL-SCNC: 4.4 MMOL/L — SIGNIFICANT CHANGE UP (ref 3.5–5.3)
POTASSIUM SERPL-SCNC: 4.4 MMOL/L — SIGNIFICANT CHANGE UP (ref 3.5–5.3)
PROCALCITONIN SERPL-MCNC: 0.17 NG/ML — HIGH (ref 0.02–0.1)
PROCALCITONIN SERPL-MCNC: 0.17 NG/ML — HIGH (ref 0.02–0.1)
PROT SERPL-MCNC: 6.3 G/DL — LOW (ref 6.6–8.7)
PROT SERPL-MCNC: 6.3 G/DL — LOW (ref 6.6–8.7)
PROT UR-MCNC: 100 MG/DL
PROT UR-MCNC: 100 MG/DL
RBC # BLD: 2.72 M/UL — LOW (ref 4.2–5.8)
RBC # BLD: 2.72 M/UL — LOW (ref 4.2–5.8)
RBC # FLD: 12.8 % — SIGNIFICANT CHANGE UP (ref 10.3–14.5)
RBC # FLD: 12.8 % — SIGNIFICANT CHANGE UP (ref 10.3–14.5)
RBC CASTS # UR COMP ASSIST: 1 /HPF — SIGNIFICANT CHANGE UP (ref 0–4)
RBC CASTS # UR COMP ASSIST: 1 /HPF — SIGNIFICANT CHANGE UP (ref 0–4)
SAO2 % BLDA: 94.4 % — SIGNIFICANT CHANGE UP (ref 94–98)
SAO2 % BLDA: 94.4 % — SIGNIFICANT CHANGE UP (ref 94–98)
SODIUM SERPL-SCNC: 147 MMOL/L — HIGH (ref 135–145)
SODIUM SERPL-SCNC: 147 MMOL/L — HIGH (ref 135–145)
SODIUM SERPL-SCNC: 148 MMOL/L — HIGH (ref 135–145)
SP GR SPEC: 1.02 — SIGNIFICANT CHANGE UP (ref 1–1.03)
SP GR SPEC: 1.02 — SIGNIFICANT CHANGE UP (ref 1–1.03)
SQUAMOUS # UR AUTO: 2 /HPF — SIGNIFICANT CHANGE UP (ref 0–5)
SQUAMOUS # UR AUTO: 2 /HPF — SIGNIFICANT CHANGE UP (ref 0–5)
UROBILINOGEN FLD QL: 1 MG/DL — SIGNIFICANT CHANGE UP (ref 0.2–1)
UROBILINOGEN FLD QL: 1 MG/DL — SIGNIFICANT CHANGE UP (ref 0.2–1)
VIT B12 SERPL-MCNC: 503 PG/ML — SIGNIFICANT CHANGE UP (ref 232–1245)
VIT B12 SERPL-MCNC: 503 PG/ML — SIGNIFICANT CHANGE UP (ref 232–1245)
WBC # BLD: 12.1 K/UL — HIGH (ref 3.8–10.5)
WBC # BLD: 12.1 K/UL — HIGH (ref 3.8–10.5)
WBC # FLD AUTO: 12.1 K/UL — HIGH (ref 3.8–10.5)
WBC # FLD AUTO: 12.1 K/UL — HIGH (ref 3.8–10.5)
WBC UR QL: 0 /HPF — SIGNIFICANT CHANGE UP (ref 0–5)
WBC UR QL: 0 /HPF — SIGNIFICANT CHANGE UP (ref 0–5)

## 2023-11-17 PROCEDURE — 99291 CRITICAL CARE FIRST HOUR: CPT

## 2023-11-17 PROCEDURE — 95720 EEG PHY/QHP EA INCR W/VEEG: CPT

## 2023-11-17 PROCEDURE — 99223 1ST HOSP IP/OBS HIGH 75: CPT

## 2023-11-17 PROCEDURE — 71045 X-RAY EXAM CHEST 1 VIEW: CPT | Mod: 26

## 2023-11-17 RX ORDER — FONDAPARINUX SODIUM 2.5 MG/.5ML
1.2 INJECTION, SOLUTION SUBCUTANEOUS
Refills: 0 | Status: DISCONTINUED | OUTPATIENT
Start: 2023-11-17 | End: 2023-11-22

## 2023-11-17 RX ADMIN — CHLORHEXIDINE GLUCONATE 1 APPLICATION(S): 213 SOLUTION TOPICAL at 05:29

## 2023-11-17 RX ADMIN — Medication 800 MILLIGRAM(S): at 18:09

## 2023-11-17 RX ADMIN — CEFEPIME 2000 MILLIGRAM(S): 1 INJECTION, POWDER, FOR SOLUTION INTRAMUSCULAR; INTRAVENOUS at 17:08

## 2023-11-17 RX ADMIN — Medication 90 MILLIGRAM(S): at 13:03

## 2023-11-17 RX ADMIN — Medication 81 MILLIGRAM(S): at 13:03

## 2023-11-17 RX ADMIN — CEFEPIME 2000 MILLIGRAM(S): 1 INJECTION, POWDER, FOR SOLUTION INTRAMUSCULAR; INTRAVENOUS at 05:30

## 2023-11-17 RX ADMIN — Medication 10 MILLIGRAM(S): at 13:06

## 2023-11-17 RX ADMIN — LEVETIRACETAM 500 MILLIGRAM(S): 250 TABLET, FILM COATED ORAL at 05:30

## 2023-11-17 RX ADMIN — FONDAPARINUX SODIUM 1.2 MILLIGRAM(S): 2.5 INJECTION, SOLUTION SUBCUTANEOUS at 17:09

## 2023-11-17 RX ADMIN — POLYETHYLENE GLYCOL 3350 17 GRAM(S): 17 POWDER, FOR SOLUTION ORAL at 13:02

## 2023-11-17 RX ADMIN — SENNA PLUS 2 TABLET(S): 8.6 TABLET ORAL at 21:11

## 2023-11-17 RX ADMIN — Medication 90 MILLIGRAM(S): at 05:29

## 2023-11-17 RX ADMIN — ATORVASTATIN CALCIUM 40 MILLIGRAM(S): 80 TABLET, FILM COATED ORAL at 21:11

## 2023-11-17 RX ADMIN — Medication 10 MILLIGRAM(S): at 14:36

## 2023-11-17 RX ADMIN — Medication 800 MILLIGRAM(S): at 19:00

## 2023-11-17 RX ADMIN — Medication 90 MILLIGRAM(S): at 21:10

## 2023-11-17 NOTE — PROGRESS NOTE ADULT - NS ATTEND AMEND GEN_ALL_CORE FT
7/2018: Angiogram: Patient LIMA to LAD and SVG's  patent; Native distal LAD with 90%      stenosisCABG x  5(2/2017) LIMA to LAD, saphenous vein graft to diagonal, OM1, OM 2      PDA   Neurosurgery Attending Attestation:    Patient seen and examined at bedside. Agree with plan and note as documented above.    84 y/o M s/p R craniectomy for ICH evacuation 11/10/2023, now s/p extubation, oriented to self, not hospital or year, FC in RUE and RLE. Trace movement in LUE and LLE. Continue monitoring for respiratory concerns. Care per NCCU.    -Marty Larios MD.

## 2023-11-17 NOTE — CHART NOTE - NSCHARTNOTEFT_GEN_A_CORE
EEG preliminary read (not final) on the initial recording hour(s) until 2PM    No seizures recorded.  Occasional right frontocentral sharp waves.   Right hemispheric slowing with breach artifact due to skull defect.     Moderate slowing noted, nonspecific.  Final report to follow tomorrow morning after completion of study.    [facility] EEG Reading Room Ph#: [phone]  Epilepsy Answering Service after 5PM and before 8:30AM: Ph#: [phone]

## 2023-11-17 NOTE — EEG REPORT - NS EEG TEXT BOX
DEUCE BALL [id number]     Study Date: 11-16-23 15:49 - 11-17-23 08:00  Duration:   --------------------------------------------------------------------------------------------------  History:  CC/ HPI Patient is a 83y old Male who presents with a chief complaint of unwitnessed fall with head strike with multicompartmental ICH s/p R hemicraniectomy.    MEDICATIONS  (STANDING):  aspirin  chewable 81 milliGRAM(s) Oral daily  atorvastatin 40 milliGRAM(s) Oral at bedtime  cefepime  Injectable.      cefepime  Injectable. 2000 milliGRAM(s) IV Push every 12 hours  chlorhexidine 2% Cloths 1 Application(s) Topical <User Schedule>  diltiazem    Tablet 90 milliGRAM(s) Oral every 8 hours  diphtheria/tetanus/pertussis (acellular) Vaccine (Adacel) 0.5 milliLiter(s) IntraMuscular once  fondaparinux Injectable 1.2 milliGRAM(s) SubCutaneous <User Schedule>  polyethylene glycol 3350 17 Gram(s) Oral daily  senna 2 Tablet(s) Oral at bedtime    --------------------------------------------------------------------------------------------------  Study Interpretation:    [[[Abbreviation Key:  PDR=alpha rhythm/posterior dominant rhythm. A-P=anterior posterior.  Amplitude: ‘very low’:<20; ‘low’:20-49; ‘medium’:; ‘high’:>150uV.  Persistence for periodic/rhythmic patterns (% of epoch) ‘rare’:<1%; ‘occasional’:1-10%; ‘frequent’:10-50%; ‘abundant’:50-90%; ‘continuous’:>90%.  Persistence for sporadic discharges: ‘rare’:<1/hr; ‘occasional’:1/min-1/hr; ‘frequent’:>1/min; ‘abundant’:>1/10 sec.  RPP=rhythmic and periodic patterns; GRDA=generalized rhythmic delta activity; FIRDA=frontal intermittent GRDA; LRDA=lateralized rhythmic delta activity; TIRDA=temporal intermittent rhythmic delta activity;  LPD=PLED=lateralized periodic discharges; GPD=generalized periodic discharges; BIPDs =bilateral independent periodic discharges; Mf=multifocal; SIRPDs=stimulus induced rhythmic, periodic, or ictal appearing discharges; BIRDs=brief potentially ictal rhythmic discharges >4 Hz, lasting .5-10s; PFA (paroxysmal bursts >13 Hz or =8 Hz <10s).  Modifiers: +F=with fast component; +S=with spike component; +R=with rhythmic component.  S-B=burst suppression pattern.  Max=maximal. N1-drowsy; N2-stage II sleep; N3-slow wave sleep. SSS/BETS=small sharp spikes/benign epileptiform transients of sleep. HV=hyperventilation; PS=photic stimulation]]]    Daily EEG Visual Analysis    FINDINGS:      Background:  Continuity: Continuous  Symmetry: asymmetric, slower on the left  Posterior dominant rhythm (PDR): 6.5 Hz on left, 6Hz on right, reactive to eye closure.   State Change: Present  Voltage: Normal  Anterior-Posterior Gradient: Present  Other background findings: Moderate diffuse slowing  Breach: R hemispheric breach rhythm    Background Slowing:  Generalized slowing: as above  Focal slowing: continuous R hemispheric theta and delta range activity    State Changes:   Drowsiness is characterized by fragmentation, attenuation, and slowing of the background activity.  Absent sleep structures    Interictal Findings:  Abundant L hemispheric F4/F8 sharp/spike wave discharges.  Occasionally, the above L hemispheric discharges become periodic at 1-1.5Hz for very brief duration.    Electrographic and Electroclinical seizures:  None    Other Clinical Events:  None    Activation Procedures:   Hyperventilation is not performed.    Photic stimulation is not performed.    Artifacts:  Intermittent myogenic and movement artifacts are present.    EKG:  Single-lead EKG shows tachycardia and irregular rhythm    EEG Classification / Summary:  Abnormal EEG in the awake, drowsy, and asleep states.   Abundant L hemispheric F4/F8 sharp/spike wave discharges.  Occasionally, the above L hemispheric discharges become periodic at 1-1.5Hz for very brief duration.  Continuous R hemispheric theta and delta range activity.  The above activity is all present within the R hemispheric breach rhythm.    Clinical Impression:  Abnormal video EEG showing  L breach rhythm with L hemispheric cortical hyperactivity, that can indicate an increased risk of seizure.  R hemispheric slowing is consistent with patient's known ICH.  No seizures are captured.      -------------------------------------------------------------------------------------------------------  Carlyn Baltazar MD, PhD  Neurocritical Care    READ IS PRELIMINARY    [facility] EEG Reading Room Ph#: [phone]  Epilepsy Answering Service after 5PM and before 8:30AM: Ph#: [phone]   DEUCE BALL [id number]     Study Date: 11-16-23 15:49 - 11-17-23 08:00  Duration: 15H 12min  --------------------------------------------------------------------------------------------------  History:  CC/ HPI Patient is a 83y old Male who presents with a chief complaint of unwitnessed fall with head strike with multicompartmental ICH s/p R hemicraniectomy.    MEDICATIONS  (STANDING):  aspirin  chewable 81 milliGRAM(s) Oral daily  atorvastatin 40 milliGRAM(s) Oral at bedtime  cefepime  Injectable.      cefepime  Injectable. 2000 milliGRAM(s) IV Push every 12 hours  chlorhexidine 2% Cloths 1 Application(s) Topical <User Schedule>  diltiazem    Tablet 90 milliGRAM(s) Oral every 8 hours  diphtheria/tetanus/pertussis (acellular) Vaccine (Adacel) 0.5 milliLiter(s) IntraMuscular once  fondaparinux Injectable 1.2 milliGRAM(s) SubCutaneous <User Schedule>  polyethylene glycol 3350 17 Gram(s) Oral daily  senna 2 Tablet(s) Oral at bedtime    --------------------------------------------------------------------------------------------------  Study Interpretation:    [[[Abbreviation Key:  PDR=alpha rhythm/posterior dominant rhythm. A-P=anterior posterior.  Amplitude: ‘very low’:<20; ‘low’:20-49; ‘medium’:; ‘high’:>150uV.  Persistence for periodic/rhythmic patterns (% of epoch) ‘rare’:<1%; ‘occasional’:1-10%; ‘frequent’:10-50%; ‘abundant’:50-90%; ‘continuous’:>90%.  Persistence for sporadic discharges: ‘rare’:<1/hr; ‘occasional’:1/min-1/hr; ‘frequent’:>1/min; ‘abundant’:>1/10 sec.  RPP=rhythmic and periodic patterns; GRDA=generalized rhythmic delta activity; FIRDA=frontal intermittent GRDA; LRDA=lateralized rhythmic delta activity; TIRDA=temporal intermittent rhythmic delta activity;  LPD=PLED=lateralized periodic discharges; GPD=generalized periodic discharges; BIPDs =bilateral independent periodic discharges; Mf=multifocal; SIRPDs=stimulus induced rhythmic, periodic, or ictal appearing discharges; BIRDs=brief potentially ictal rhythmic discharges >4 Hz, lasting .5-10s; PFA (paroxysmal bursts >13 Hz or =8 Hz <10s).  Modifiers: +F=with fast component; +S=with spike component; +R=with rhythmic component.  S-B=burst suppression pattern.  Max=maximal. N1-drowsy; N2-stage II sleep; N3-slow wave sleep. SSS/BETS=small sharp spikes/benign epileptiform transients of sleep. HV=hyperventilation; PS=photic stimulation]]]    Daily EEG Visual Analysis    FINDINGS:      Background:  Continuity: Continuous  Symmetry: asymmetric, slower on the right  Posterior dominant rhythm (PDR): 6.5 Hz on left, 6Hz on right, reactive to eye closure.   State Change: Present  Voltage: Normal  Anterior-Posterior Gradient: Present  Other background findings: Moderate diffuse slowing  Breach: R hemispheric breach rhythm    Background Slowing:  Generalized slowing: as above  Focal slowing: continuous R hemispheric theta and delta range activity    State Changes:   Drowsiness is characterized by fragmentation, attenuation, and slowing of the background activity.  Absent sleep structures    Interictal Findings:  Abundant Right hemispheric F4/F8 sharp/spike wave discharges.  Occasionally, the above Right hemispheric discharges become periodic at 1-1.5Hz for very brief duration.    Electrographic and Electroclinical seizures:  None    Other Clinical Events:  None    Activation Procedures:   Hyperventilation is not performed.    Photic stimulation is not performed.    Artifacts:  Intermittent myogenic and movement artifacts are present.    EKG:  Single-lead EKG shows tachycardia and irregular rhythm    EEG Classification / Summary:  Abnormal EEG in the awake, drowsy, and asleep states.   Abundant Right hemispheric F4/F8 sharp/spike wave discharges.  Occasionally, the above Right hemispheric discharges become periodic at 1-1.5Hz for very brief duration.  Continuous Right hemispheric theta and delta range activity.  The above activity is all present within the Right hemispheric breach rhythm.    Clinical Impression:  Abnormal video EEG showing  Right breach rhythm with Right hemispheric cortical hyperactivity, that can indicate an increased risk of seizure.  Right hemispheric slowing is consistent with patient's known ICH.  No seizures are captured.      -------------------------------------------------------------------------------------------------------  Carlyn Baltazar MD, PhD  Neurocritical Care    Brendon Chatman MD  EEG/Epilepsy Attending     [facility] EEG Reading Room Ph#: [phone]  Epilepsy Answering Service after 5PM and before 8:30AM: Ph#: [phone]

## 2023-11-17 NOTE — PROGRESS NOTE ADULT - SUBJECTIVE AND OBJECTIVE BOX
82y M with PMH HTN, CAD s/p stents, renal cell carcinoma status post left nephrectomy, bladder CA, BPH, CHF, LBBB, vertigo,  HLD, 5.5cm AAA without rupture post EVAR, paroxysmal A-fib on Eliquis, Plavix, and aspirin, early stage Alzheimer dementia transferred from [facility] s/p unwitnessed fall with head strike at home found by wife on floor at 8am. Patient brought to urgent care by wife and had 5 staples to posterior scalp but was instructed to go to nearest ED.  CT head at [place] showed R frontal IPH, SDH, SAH at 9:00. CTA stroke protocol not performed at [facility]. Patient BIB on 6L NC.  Pt GCS 15 on arrival, A&Ox2 on arrival. After initial assessment, patient noted to be vomiting enroute to CT scanner.     24hr EVENTS:  11/10 - OR for decompression.  11/11 - Tolerating CPAP  11/12 - CTH with slight worse edema and subgaleal blood collection on personal read. worse exam as below  11/13 - Afebrile with improving WBC. Exam improved in AM. Extubated. sp 1U Plts    O/n events: episodes of AMS noted.    ROS: [ ]  Unable to assess due to mental status       -----------------------------------------------------------------------------------------------------------------------------------------------------------------------------------    PHYSICAL EXAM:  General: intubated  HEENT: MMM  Neuro:  -Mental status- No acute distress  -CN- PERRL 3mm, EOMI, tongue midline, L facial  - R full strength following commands  - L Hemiparesis    CV: RRR  Pulm: clear to auscultation  Abd: Soft, nontender, nondistended  Ext: no noted edema in lower ext  Skin: warm, dry      ------------------------------------------------------------------------------------------------------    ICU Vital Signs Last 24 Hrs  T(C): 37.7 (17 Nov 2023 16:00), Max: 37.8 (16 Nov 2023 23:00)  T(F): 99.9 (17 Nov 2023 16:00), Max: 100 (16 Nov 2023 23:00)  HR: 75 (17 Nov 2023 16:00) (53 - 99)  BP: 141/57 (17 Nov 2023 16:00) (104/64 - 170/68)  BP(mean): 81 (17 Nov 2023 16:00) (67 - 116)  ABP: --  ABP(mean): --  RR: 16 (17 Nov 2023 16:00) (11 - 25)  SpO2: 100% (17 Nov 2023 16:00) (93% - 100%)    O2 Parameters below as of 17 Nov 2023 16:00  Patient On (Oxygen Delivery Method): nasal cannula  O2 Flow (L/min): 6

## 2023-11-17 NOTE — DIETITIAN NUTRITION RISK NOTIFICATION - ADDITIONAL COMMENTS/DIETITIAN RECOMMENDATIONS
As/when medically feasible; re-start enteral feeds of Nepro @ 10ml/hr; increase by 10ml every 6 hours as tolerated to goal rate = 50ml/hr (x20 hours) 1000ml/day; 1800kcal, 81gm protein

## 2023-11-17 NOTE — CONSULT NOTE ADULT - SUBJECTIVE AND OBJECTIVE BOX
83yM was admitted on 11-10    In ED, GCS= 15, then quickly decompensated     Patient is a 83y old  Male who presents with a chief complaint of s/p unwitnessed fall with head strike (2023 01:46)    HPI:  82y M with PMH HTN, CAD s/p stents, renal cell carcinoma status post left nephrectomy, bladder CA, BPH, CHF, LBBB, vertigo,  HLD, 5.5cm AAA without rupture post EVAR, paroxysmal A-fib on Eliquis, Plavix, and aspirin, early stage Alzheimer dementia transferred from [facility] s/p unwitnessed fall with head strike at home found by girlfriend on floor at 8am. Patient brought to urgent care by girlfriend and had 5 staples to posterior scalp but was instructed to go to nearest ED.  CT head at [place] showed R frontal IPH, SDH, SAH at 9:00. CTA stroke protocol not performed at [facility]. Patient BIB on 6L NC.  Pt GCS 15 on arrival, A&Ox2 on arrival. After initial assessment, patient noted to be vomiting enroute to CT scanner. Was sedated and intubated for airway protection. Patient is s/p R hemicraniectomy 11/10/23. Extubated on 23. Per RN at bedside, patient was was initially AAO to person, occasionally to place, intermittently following simple commands. On 11/15/23, patient noted to be more somnolent on exam, with increased work of breathing.  Patient was arousable to sternal rub and loudly calling his name, but then fell back asleep. With phlebotomy draw, he voluntarily moved his RUE and RLE to pain. Unable to tolerate sustained conversation or examination.          (10 Nov 2023 11:04)      Imaging performed:  CTH 23: Improved to stable multicompartment intracranial hemorrhages.   Right-sided subdural hygroma, larger in size.    US duplex BLE 23: No evidence of deep venous thrombosis in either lower extremity.    CXR 11/15/23: There is mild pulmonary venous congestion. Left retrocardiac/lower lobe   opacity could represent associated pneumonia.    MR Brain 23: RIGHT frontoparietal craniectomy with underlying   intracranial hemorrhage and edema within the RIGHT frontal lobe.   Overlying small RIGHT subdural hemorrhage is also unchanged. Scant   hemorrhage in the dependent portions of the BILATERAL lateral ventricles   and minimal subarachnoid hemorrhage seen in the BILATERAL frontal and   parietal lobes. Moderate periventricular and deep white matter ischemia   is noted. Encephalomalacia and gliosis seen in the anterior frontal lobes   bilaterally. Tiny acute lacunar infarction in the LEFT cerebellum and   tiny acute cortical infarction in the RIGHT parietal lobe. Restricted   diffusion also noted about the surgical cavity suggesting infarcted   parenchyma.          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)    Abdominal aortic aneurysm (AAA) without rupture    Congestive heart failure, unspecified chronicity, unspecified heart failure type    Hypertension    History of abdominal aortic aneurysm (AAA)    CAD (coronary artery disease)    Alzheimers disease    Status Post Cystoscopy (ICD9 V45.89)    urethral Stent 2008    Ureter Surgery    S/P Cystoscopy    S/P Cystoscopy    S/P Cystoscopy    S/P Tonsillectomy    History of Lt  Nephrectomy    S/P cystoscopy    History of cystoscopy    H/O abdominal aortic aneurysm repair    No significant past surgical history    S/P AAA repair    History of nephrectomy    Presence of stent in LAD coronary artery    History of heart artery stent        FUNCTIONAL HISTORY  Lives with girlfriend in basement apartment, [address].   Estranged from wife (30 years).   Daughter is HCP  Independent in ADLs and ambulation PTA    CURRENT FUNCTIONAL STATUS  PT 11/15/23  bed mobility - max A x2  transfers - max A x2  gait - unable to asses    OT  bed mobility - mod A x2  transfers - max A x2  LBD - total a      SOCIAL HISTORY - as per documentation/history  Smoking - None  EtOH - None  Drugs - None    FAMILY HISTORY   Family history of MI (myocardial infarction) (Father, Mother)    No pertinent family history in first degree relatives    Family history of early CAD (Father, Mother)        RECENT LABS - Reviewed  CBC Full  -  ( 2023 04:30 )  WBC Count : 12.10 K/uL  RBC Count : 2.72 M/uL  Hemoglobin : 8.4 g/dL  Hematocrit : 26.0 %  Platelet Count - Automated : 237 K/uL  Mean Cell Volume : 95.6 fl  Mean Cell Hemoglobin : 30.9 pg  Mean Cell Hemoglobin Concentration : 32.3 gm/dL  Auto Neutrophil # : x  Auto Lymphocyte # : x  Auto Monocyte # : x  Auto Eosinophil # : x  Auto Basophil # : x  Auto Neutrophil % : x  Auto Lymphocyte % : x  Auto Monocyte % : x  Auto Eosinophil % : x  Auto Basophil % : x        148<H>  |  113<H>  |  47.0<H>  ----------------------------<  122<H>  4.0   |  24.0  |  1.72<H>    Ca    8.8      2023 04:30  Phos  3.7       Mg     2.3           Urinalysis Basic - ( 2023 11:09 )    Color: Yellow / Appearance: Clear / S.019 / pH: x  Gluc: x / Ketone: Negative mg/dL  / Bili: Negative / Urobili: 1.0 mg/dL   Blood: x / Protein: 100 mg/dL / Nitrite: Negative   Leuk Esterase: Negative / RBC: 1 /HPF / WBC 0 /HPF   Sq Epi: x / Non Sq Epi: 2 /HPF / Bacteria: Negative /HPF        ALLERGIES  penicillin (Rash)  heparin (Other (Severe))  penicillin (Hives)  Cipro (Other)  Levaquin (Other)  heparin (Other)      MEDICATIONS   acetaminophen   Oral Liquid .. 800 milliGRAM(s) Oral every 6 hours PRN  aspirin  chewable 81 milliGRAM(s) Oral daily  atorvastatin 40 milliGRAM(s) Oral at bedtime  cefepime  Injectable.      cefepime  Injectable. 2000 milliGRAM(s) IV Push every 12 hours  chlorhexidine 2% Cloths 1 Application(s) Topical <User Schedule>  diltiazem    Tablet 90 milliGRAM(s) Oral every 8 hours  diphtheria/tetanus/pertussis (acellular) Vaccine (Adacel) 0.5 milliLiter(s) IntraMuscular once  fondaparinux Injectable 1.2 milliGRAM(s) SubCutaneous <User Schedule>  hydrALAZINE Injectable 10 milliGRAM(s) IV Push every 2 hours PRN  labetalol Injectable 10 milliGRAM(s) IV Push every 2 hours PRN  polyethylene glycol 3350 17 Gram(s) Oral daily  senna 2 Tablet(s) Oral at bedtime      VITALS  T(C): 37.7 (23 @ 12:00), Max: 37.8 (23 @ 23:00)  HR: 69 (23 @ 12:00) (53 - 92)  BP: 136/68 (23 @ 12:00) (104/64 - 164/85)  RR: 19 (23 @ 12:00) (11 - 25)  SpO2: 100% (23 @ 12:00) (93% - 100%)  Wt(kg): --    ----------------------------------------------------------------------------------------  PHYSICAL EXAM  Constitutional - somnolent, difficult to arouse   HEENT - +R crani, gauze dressing in place, no bleeding or soaking through dressing, +NGT  Chest - +rhonchi +Cough +inc work of breathing.   Cardiovascular - S1S2  Abdomen - Soft   Extremities - +left hand edema, BL LE SCDs in place.   Neurologic Exam -                    Cognitive - unable to arouse, per RN patient was AAO to name most of the time-11/15.      Communication - unable to assess due to somnolence.      Cranial Nerves - unable to assess due to somnolence.      Motor -                    LEFT    UE - flaccid                     RIGHT UE - moves extremity spontaneously to pain                     LEFT    LE - moves extremity spontaneously to pain                     RIGHT LE - moves extremity spontaneously to pain       Sensory -  unable to assess due to somnolence.      Reflexes - unable to assess due to somnolence.      Coordination - unable to assess due to somnolence.     ----------------------------------------------------------------------------------------  ASSESSMENT/PLAN  83yMale with functional deficits after unwitnessed fall with +headstrike, found to have large IPH, SDH, SAH, is s/p R hemicraniectomy on 11/10/23. Post-op course c/b aspiration PNA, worsening lethargy.   IPH s/p craniectomy - on keppra for seizure ppx, EEG  shows slowing, no seizures, but increased risk. Worsening lethargy, ongoing medical workup.   Aspiration PNA - on cefepime   Pain - Tylenol, avoid sedating medications.   Afib - home eliquis held in setting in H, currently on diltiazem.   DVT PPX - SCDs, ASA and fondaparinux (hx of HIT)  Social issues - reviewed Ethics consult note on 23 regarding patient's HCP, marital and family status. Per Ethics note, " the appropriate surrogates for the patient are his daughters Jennifer and Angela. The team should continue to engage with them for medical decision making. Jennifer can be reached at [phone] and Angela can be reached at [phone]" -   Rehab - Patient currently with active medical issues, requiring continued ICU level care. Recent worsening lethargy over the past 2 days, with ongoing medical workup. He is currently unable to tolerate PT, OT and SLP sessions. Will continue to follow and resume therapies once he is medically stable to determine functional status and goals. Brain injury PM&R team will continue to follow. Recommend ongoing mobilization by staff to maintain cardiopulmonary function and prevention of secondary complications related to debility. Discussed with rehab team.      Will continue to follow and current recommendations may change if functional progress changes.

## 2023-11-17 NOTE — PROGRESS NOTE ADULT - ASSESSMENT
83 yo male POD#4 s/p a right decompressive hemicraniectomy by Dr. Larios    -Neurochecks q1  -Pain Control, avoid over sedation  -Goal SBP 90 to 160  -Monitor for Airway protection  -Keppra for seizure ppx  -Keep HOB < 30 degrees  -DG this AM if resp issues do not flair up.  -EEG monitoring  -Plan of care to be discussed in AM with neurosurgery attending 83 yo male POD#7 s/p a right decompressive hemicraniectomy by Dr. Larios    -Neurochecks q1  -Pain Control, avoid over sedation  -Goal SBP 90 to 160  -Monitor for Airway protection  -Keppra for seizure ppx  -Keep HOB < 30 degrees  -DG this AM if resp issues do not flair up.  -EEG monitoring  -Plan of care to be discussed in AM with neurosurgery attending

## 2023-11-17 NOTE — DIETITIAN NUTRITION RISK NOTIFICATION - TREATMENT: THE FOLLOWING DIET HAS BEEN RECOMMENDED
Diet, NPO:   Tube Feeding Modality: Nasogastric  Nepro (NEPRORTH)  Total Volume for 24 Hours (mL): 240  Continuous  Starting Tube Feed Rate {mL per Hour}: 10     Every 4 hours  Until Goal Tube Feed Rate (mL per Hour): 10  Tube Feed Duration (in Hours): 24  Tube Feed Start Time: 18:00   Frequency: Every 6 Hours (11-16-23 @ 19:35) [Active]

## 2023-11-17 NOTE — CHART NOTE - NSCHARTNOTEFT_GEN_A_CORE
Source: Patient [ ]  Family [ ]   other [x ] EMR and staff     Current Diet: Diet, NPO:   Tube Feeding Modality: Nasogastric  Nepro (NEPRORTH)  Total Volume for 24 Hours (mL): 240  Continuous  Starting Tube Feed Rate {mL per Hour}: 10     Every 4 hours  Until Goal Tube Feed Rate (mL per Hour): 10  Tube Feed Duration (in Hours): 24  Tube Feed Start Time: 18:00   Frequency: Every 6 Hours (11-16-23 @ 19:35) [Active]    Enteral /Parenteral Nutrition: Nepro @10ml/hr (x20 hours) 200ml/day; 360kcal, 16gm protein     Current Weight:   11/16: 71.2 kg   11/14: 75.6 kg   11/11: 72.8 kg   (Generalized edema, 2+ edema to head, 3+ edema to B/L arms)     % Weight Change: Unsure of accuracy of weights; will continue to monitor weights for trends.     Pertinent Medications: MEDICATIONS  (STANDING):  aspirin  chewable 81 milliGRAM(s) Oral daily  atorvastatin 40 milliGRAM(s) Oral at bedtime  cefepime  Injectable.      cefepime  Injectable. 2000 milliGRAM(s) IV Push every 12 hours  chlorhexidine 2% Cloths 1 Application(s) Topical <User Schedule>  diltiazem    Tablet 90 milliGRAM(s) Oral every 8 hours  diphtheria/tetanus/pertussis (acellular) Vaccine (Adacel) 0.5 milliLiter(s) IntraMuscular once  levETIRAcetam  Solution 500 milliGRAM(s) Oral two times a day  polyethylene glycol 3350 17 Gram(s) Oral daily  senna 2 Tablet(s) Oral at bedtime    MEDICATIONS  (PRN):  acetaminophen   Oral Liquid .. 800 milliGRAM(s) Oral every 6 hours PRN Temp greater or equal to 38C (100.4F)  hydrALAZINE Injectable 10 milliGRAM(s) IV Push every 2 hours PRN SBP>160  labetalol Injectable 10 milliGRAM(s) IV Push every 2 hours PRN SBP>160  oxyCODONE    IR 10 milliGRAM(s) Oral every 4 hours PRN Severe Pain (7 - 10)  oxyCODONE    IR 5 milliGRAM(s) Oral every 4 hours PRN Moderate Pain (4 - 6)    Pertinent Labs: CBC Full  -  ( 17 Nov 2023 04:30 )  WBC Count : 12.10 K/uL  RBC Count : 2.72 M/uL  Hemoglobin : 8.4 g/dL  Hematocrit : 26.0 %  Platelet Count - Automated : 237 K/uL  Mean Cell Volume : 95.6 fl  Mean Cell Hemoglobin : 30.9 pg  Mean Cell Hemoglobin Concentration : 32.3 gm/dL  Auto Neutrophil # : x  Auto Lymphocyte # : x  Auto Monocyte # : x  Auto Eosinophil # : x  Auto Basophil # : x  Auto Neutrophil % : x  Auto Lymphocyte % : x  Auto Monocyte % : x  Auto Eosinophil % : x  Auto Basophil % : x        Skin: Surgical site to head     Nutrition focused physical exam conducted - found signs of malnutrition [ ]absent [x ]present    Subcutaneous fat loss: Mod [x ] Orbital fat pads region, [x ]Buccal fat region, [x ]Triceps region,  [x ]Ribs region    Muscle wasting: Mod [ x]Temples region, [ x]Clavicle region, [x ]Shoulder region, [ ]Scapula region, [ ]Interosseous region,  [ ]thigh region, [ ]Calf region    Estimated Needs:   [x ] no change since previous assessment  [ ] recalculated:     Hospital Course:   Pt is 82 year old M with TBI - R frontal IPH, SDH, tSAH; s/p R decompressive hemicraniectomy 11/10.  Respiratory distress due to impaired bulbar function with difficulty managing secretions, aspiration PNA.  PMH of CAD s/p stents, CHF; HTN, HLD. AAA post EVAR. Parox. A.Fib, on Cardizem. Not on AC now in view of TBI.  PMH of renal cell carcinoma status post left nephrectomy, bladder CA, BPH  Nonoliguric DONNIE, likely ROXANNE, critical illness.      Current Nutrition Diagnosis:  Pt remains at high nutrition risk secondary to severe (acute) protein calorie malnutrition related to inability to meet sufficient protein energy needs in setting of TBI, - R frontal IPH, SDH, tSAH; s/p R decompressive hemicraniectomy 11/10 as evidenced by physical signs of mod muscle/fat loss, + edema and meeting < 50% estimated energy needs > 5days. Pt with reduced cognition noted; per SLP; pt to remain NPO. Trickle feeds initiated; however currently on hold. Recommendations below:     Recommendations:   1. RX: Nephro-roma and folic acid daily   2. Check weight daily to monitor trends   3. As/when medically feasible; re-start enteral feeds of Nepro @ 10ml/hr; increase by 10ml every 6 hours as tolerated to goal rate = 50ml/hr (x20 hours) 1000ml/day; 1800kcal, 81gm protein   4. Monitor Na+, Renal labs     Monitoring and Evaluation:   [ ] PO intake [x ] Tolerance to diet prescription [X] Weights  [X] Follow up per protocol [X] Labs:

## 2023-11-17 NOTE — PROGRESS NOTE ADULT - SUBJECTIVE AND OBJECTIVE BOX
HPI:  82y M with PMH HTN, CAD s/p stents, renal cell carcinoma status post left nephrectomy, bladder CA, BPH, CHF, LBBB, vertigo,  HLD, 5.5cm AAA without rupture post EVAR, paroxysmal A-fib on Eliquis, Plavix, and aspirin, early stage Alzheimer dementia transferred from [facility] s/p unwitnessed fall with head strike at home found by wife on floor at 8am. Patient brought to urgent care by wife and had 5 staples to posterior scalp but was instructed to go to nearest ED.  CT head at [place] showed R frontal IPH, SDH, SAH at 9:00. CTA stroke protocol not performed at [facility]. Patient BIB on 6L NC.  Pt GCS 15 on arrival, A&Ox2 on arrival. After initial assessment, patient noted to be vomiting enroute to CT scanner.     INTERVAL HPI/OVERNIGHT EVENTS:  Patient seen and examined, no indications of distress. Pt resting comfortably in bed.    Vital Signs Last 24 Hrs  T(C): 37.7 (17 Nov 2023 01:00), Max: 37.8 (16 Nov 2023 23:00)  T(F): 99.9 (17 Nov 2023 01:00), Max: 100 (16 Nov 2023 23:00)  HR: 67 (17 Nov 2023 01:00) (54 - 100)  BP: 113/68 (17 Nov 2023 01:00) (113/68 - 164/85)  BP(mean): 82 (17 Nov 2023 01:00) (67 - 116)  RR: 20 (17 Nov 2023 01:00) (11 - 22)  SpO2: 94% (17 Nov 2023 01:00) (94% - 100%)    Parameters below as of 17 Nov 2023 00:00  Patient On (Oxygen Delivery Method): nasal cannula  O2 Flow (L/min): 4    PHYSICAL EXAM:  GENERAL: No acute distress, well-developed  HEAD:  Atraumatic, Normocephalic  EYES: EOMI, PERRLA, conjunctiva and sclera clear  NECK: Supple, no lymphadenopathy, no JVD  CHEST/LUNG: Lung sounds clear, diminished at the bases  HEART: Regular rate and rhythm.   ABDOMEN: Soft, non-tender, non-distended  EXTREMITIES:  2+ peripheral pulses b/l, No clubbing, cyanosis, or edema  NEUROLOGY: Alert, follows commands, LUE and LLE weakness  SKIN: No rashes or lesions    LABS:                        8.8    11.02 )-----------( 217      ( 16 Nov 2023 02:30 )             26.6     11-17    147<H>  |  113<H>  |  45.1<H>  ----------------------------<  141<H>  3.9   |  23.0  |  1.68<H>    Ca    8.8      17 Nov 2023 00:35  Phos  2.9     11-16  Mg     2.4     11-16    Urinalysis Basic - ( 17 Nov 2023 00:35 )    Color: x / Appearance: x / SG: x / pH: x  Gluc: 141 mg/dL / Ketone: x  / Bili: x / Urobili: x   Blood: x / Protein: x / Nitrite: x   Leuk Esterase: x / RBC: x / WBC x   Sq Epi: x / Non Sq Epi: x / Bacteria: x    11-15 @ 07:01 - 11-16 @ 07:00  --------------------------------------------------------  IN: 1780 mL / OUT: 3260 mL / NET: -1480 mL    11-16 @ 07:01  -  11-17 @ 01:47  --------------------------------------------------------  IN: 1620 mL / OUT: 1650 mL / NET: -30 mL

## 2023-11-17 NOTE — PROGRESS NOTE ADULT - ASSESSMENT
82y M with TBI - R frontal IPH, SDH, tSAH; s/p R decompressive hemicraniectomy 11/10.  Encephalopathy due to TBI, resp. infection, resp. distress, delirium.  Respiratory distress due to impaired bulbar function with difficulty managing secretions, aspiration PNA.  PMH of CAD s/p stents, CHF; HTN, HLD. AAA post EVAR. Parox. A.Fib, on Cardizem. Not on AC now in view of TBI.  PMH of renal cell carcinoma status post left nephrectomy, bladder CA, BPH  Nonoliguric DONNIE, likely ROXANNE, critical illness; improving.  AV vegetation on TTE - unclear etiology. BCx negative.  Hypernatremia.   H/o HIT.    Plan:  neurochecks, protected sleep time  d/c Keppra, cont cEEG for now  monitor resp status, switched to NC now; low threshold for intubation  re-check metabolic profile  cont nebs + Mucomyst, aggressive chest PT, suctioning   obtain CXR, ABG  cont Diltiazem for rate control and HTN, 90 q8hrs, maintain -150, HR<120  cont ASA 81 daily  hold TF for now; ulcer ppx, BM regimen  monitor e-lytes and UOP  cont D5W, BMP q8hrs  cont Cefepime for Klebs tracheobronchitis vs aspiration PNA, renal dose  re-sent BCx, UA  SCDs, chemoppx with Fondaparinux, renal dose

## 2023-11-17 NOTE — PROVIDER CONTACT NOTE (OTHER) - ASSESSMENT
Pt previously on 2% saline to LAC. L arm noted to be edematous; multiple blisters and hard to touch. +2 pulses to arm.
Pt more lethargic than this morning. Requires repeated stimuli to arouse.

## 2023-11-18 LAB
ANION GAP SERPL CALC-SCNC: 12 MMOL/L — SIGNIFICANT CHANGE UP (ref 5–17)
ANION GAP SERPL CALC-SCNC: 12 MMOL/L — SIGNIFICANT CHANGE UP (ref 5–17)
ANION GAP SERPL CALC-SCNC: 13 MMOL/L — SIGNIFICANT CHANGE UP (ref 5–17)
ANION GAP SERPL CALC-SCNC: 13 MMOL/L — SIGNIFICANT CHANGE UP (ref 5–17)
ANION GAP SERPL CALC-SCNC: 14 MMOL/L — SIGNIFICANT CHANGE UP (ref 5–17)
ANION GAP SERPL CALC-SCNC: 14 MMOL/L — SIGNIFICANT CHANGE UP (ref 5–17)
BUN SERPL-MCNC: 45.6 MG/DL — HIGH (ref 8–20)
BUN SERPL-MCNC: 45.6 MG/DL — HIGH (ref 8–20)
BUN SERPL-MCNC: 45.8 MG/DL — HIGH (ref 8–20)
BUN SERPL-MCNC: 45.8 MG/DL — HIGH (ref 8–20)
BUN SERPL-MCNC: 47.1 MG/DL — HIGH (ref 8–20)
BUN SERPL-MCNC: 47.1 MG/DL — HIGH (ref 8–20)
CALCIUM SERPL-MCNC: 8.4 MG/DL — SIGNIFICANT CHANGE UP (ref 8.4–10.5)
CALCIUM SERPL-MCNC: 8.4 MG/DL — SIGNIFICANT CHANGE UP (ref 8.4–10.5)
CALCIUM SERPL-MCNC: 9 MG/DL — SIGNIFICANT CHANGE UP (ref 8.4–10.5)
CALCIUM SERPL-MCNC: 9 MG/DL — SIGNIFICANT CHANGE UP (ref 8.4–10.5)
CALCIUM SERPL-MCNC: 9.2 MG/DL — SIGNIFICANT CHANGE UP (ref 8.4–10.5)
CALCIUM SERPL-MCNC: 9.2 MG/DL — SIGNIFICANT CHANGE UP (ref 8.4–10.5)
CHLORIDE SERPL-SCNC: 112 MMOL/L — HIGH (ref 96–108)
CHLORIDE SERPL-SCNC: 112 MMOL/L — HIGH (ref 96–108)
CHLORIDE SERPL-SCNC: 113 MMOL/L — HIGH (ref 96–108)
CHLORIDE SERPL-SCNC: 113 MMOL/L — HIGH (ref 96–108)
CHLORIDE SERPL-SCNC: 114 MMOL/L — HIGH (ref 96–108)
CHLORIDE SERPL-SCNC: 114 MMOL/L — HIGH (ref 96–108)
CO2 SERPL-SCNC: 22 MMOL/L — SIGNIFICANT CHANGE UP (ref 22–29)
CO2 SERPL-SCNC: 23 MMOL/L — SIGNIFICANT CHANGE UP (ref 22–29)
CO2 SERPL-SCNC: 23 MMOL/L — SIGNIFICANT CHANGE UP (ref 22–29)
CREAT SERPL-MCNC: 1.46 MG/DL — HIGH (ref 0.5–1.3)
CREAT SERPL-MCNC: 1.46 MG/DL — HIGH (ref 0.5–1.3)
CREAT SERPL-MCNC: 1.53 MG/DL — HIGH (ref 0.5–1.3)
CREAT SERPL-MCNC: 1.53 MG/DL — HIGH (ref 0.5–1.3)
CREAT SERPL-MCNC: 1.62 MG/DL — HIGH (ref 0.5–1.3)
CREAT SERPL-MCNC: 1.62 MG/DL — HIGH (ref 0.5–1.3)
CULTURE RESULTS: SIGNIFICANT CHANGE UP
EGFR: 42 ML/MIN/1.73M2 — LOW
EGFR: 42 ML/MIN/1.73M2 — LOW
EGFR: 45 ML/MIN/1.73M2 — LOW
EGFR: 45 ML/MIN/1.73M2 — LOW
EGFR: 48 ML/MIN/1.73M2 — LOW
EGFR: 48 ML/MIN/1.73M2 — LOW
GLUCOSE SERPL-MCNC: 106 MG/DL — HIGH (ref 70–99)
GLUCOSE SERPL-MCNC: 106 MG/DL — HIGH (ref 70–99)
GLUCOSE SERPL-MCNC: 119 MG/DL — HIGH (ref 70–99)
GLUCOSE SERPL-MCNC: 119 MG/DL — HIGH (ref 70–99)
GLUCOSE SERPL-MCNC: 128 MG/DL — HIGH (ref 70–99)
GLUCOSE SERPL-MCNC: 128 MG/DL — HIGH (ref 70–99)
HCT VFR BLD CALC: 29.2 % — LOW (ref 39–50)
HCT VFR BLD CALC: 29.2 % — LOW (ref 39–50)
HGB BLD-MCNC: 9.8 G/DL — LOW (ref 13–17)
HGB BLD-MCNC: 9.8 G/DL — LOW (ref 13–17)
MAGNESIUM SERPL-MCNC: 2.4 MG/DL — SIGNIFICANT CHANGE UP (ref 1.6–2.6)
MAGNESIUM SERPL-MCNC: 2.4 MG/DL — SIGNIFICANT CHANGE UP (ref 1.6–2.6)
MCHC RBC-ENTMCNC: 31.7 PG — SIGNIFICANT CHANGE UP (ref 27–34)
MCHC RBC-ENTMCNC: 31.7 PG — SIGNIFICANT CHANGE UP (ref 27–34)
MCHC RBC-ENTMCNC: 33.6 GM/DL — SIGNIFICANT CHANGE UP (ref 32–36)
MCHC RBC-ENTMCNC: 33.6 GM/DL — SIGNIFICANT CHANGE UP (ref 32–36)
MCV RBC AUTO: 94.5 FL — SIGNIFICANT CHANGE UP (ref 80–100)
MCV RBC AUTO: 94.5 FL — SIGNIFICANT CHANGE UP (ref 80–100)
PHOSPHATE SERPL-MCNC: 3.7 MG/DL — SIGNIFICANT CHANGE UP (ref 2.4–4.7)
PHOSPHATE SERPL-MCNC: 3.7 MG/DL — SIGNIFICANT CHANGE UP (ref 2.4–4.7)
PLATELET # BLD AUTO: 287 K/UL — SIGNIFICANT CHANGE UP (ref 150–400)
PLATELET # BLD AUTO: 287 K/UL — SIGNIFICANT CHANGE UP (ref 150–400)
POTASSIUM SERPL-MCNC: 4.2 MMOL/L — SIGNIFICANT CHANGE UP (ref 3.5–5.3)
POTASSIUM SERPL-MCNC: 4.2 MMOL/L — SIGNIFICANT CHANGE UP (ref 3.5–5.3)
POTASSIUM SERPL-MCNC: 4.5 MMOL/L — SIGNIFICANT CHANGE UP (ref 3.5–5.3)
POTASSIUM SERPL-MCNC: 4.5 MMOL/L — SIGNIFICANT CHANGE UP (ref 3.5–5.3)
POTASSIUM SERPL-MCNC: 4.6 MMOL/L — SIGNIFICANT CHANGE UP (ref 3.5–5.3)
POTASSIUM SERPL-MCNC: 4.6 MMOL/L — SIGNIFICANT CHANGE UP (ref 3.5–5.3)
POTASSIUM SERPL-SCNC: 4.2 MMOL/L — SIGNIFICANT CHANGE UP (ref 3.5–5.3)
POTASSIUM SERPL-SCNC: 4.2 MMOL/L — SIGNIFICANT CHANGE UP (ref 3.5–5.3)
POTASSIUM SERPL-SCNC: 4.5 MMOL/L — SIGNIFICANT CHANGE UP (ref 3.5–5.3)
POTASSIUM SERPL-SCNC: 4.5 MMOL/L — SIGNIFICANT CHANGE UP (ref 3.5–5.3)
POTASSIUM SERPL-SCNC: 4.6 MMOL/L — SIGNIFICANT CHANGE UP (ref 3.5–5.3)
POTASSIUM SERPL-SCNC: 4.6 MMOL/L — SIGNIFICANT CHANGE UP (ref 3.5–5.3)
RBC # BLD: 3.09 M/UL — LOW (ref 4.2–5.8)
RBC # BLD: 3.09 M/UL — LOW (ref 4.2–5.8)
RBC # FLD: 12.6 % — SIGNIFICANT CHANGE UP (ref 10.3–14.5)
RBC # FLD: 12.6 % — SIGNIFICANT CHANGE UP (ref 10.3–14.5)
SODIUM SERPL-SCNC: 147 MMOL/L — HIGH (ref 135–145)
SODIUM SERPL-SCNC: 147 MMOL/L — HIGH (ref 135–145)
SODIUM SERPL-SCNC: 148 MMOL/L — HIGH (ref 135–145)
SODIUM SERPL-SCNC: 148 MMOL/L — HIGH (ref 135–145)
SODIUM SERPL-SCNC: 150 MMOL/L — HIGH (ref 135–145)
SODIUM SERPL-SCNC: 150 MMOL/L — HIGH (ref 135–145)
SPECIMEN SOURCE: SIGNIFICANT CHANGE UP
WBC # BLD: 13.19 K/UL — HIGH (ref 3.8–10.5)
WBC # BLD: 13.19 K/UL — HIGH (ref 3.8–10.5)
WBC # FLD AUTO: 13.19 K/UL — HIGH (ref 3.8–10.5)
WBC # FLD AUTO: 13.19 K/UL — HIGH (ref 3.8–10.5)

## 2023-11-18 PROCEDURE — 95718 EEG PHYS/QHP 2-12 HR W/VEEG: CPT

## 2023-11-18 PROCEDURE — 71045 X-RAY EXAM CHEST 1 VIEW: CPT | Mod: 26

## 2023-11-18 PROCEDURE — 99291 CRITICAL CARE FIRST HOUR: CPT

## 2023-11-18 RX ORDER — IPRATROPIUM/ALBUTEROL SULFATE 18-103MCG
3 AEROSOL WITH ADAPTER (GRAM) INHALATION EVERY 6 HOURS
Refills: 0 | Status: DISCONTINUED | OUTPATIENT
Start: 2023-11-18 | End: 2023-11-18

## 2023-11-18 RX ORDER — SODIUM CHLORIDE 9 MG/ML
1000 INJECTION, SOLUTION INTRAVENOUS
Refills: 0 | Status: DISCONTINUED | OUTPATIENT
Start: 2023-11-18 | End: 2023-11-20

## 2023-11-18 RX ORDER — AMANTADINE HCL 100 MG
100 CAPSULE ORAL
Refills: 0 | Status: DISCONTINUED | OUTPATIENT
Start: 2023-11-18 | End: 2023-11-27

## 2023-11-18 RX ORDER — LEVALBUTEROL 1.25 MG/.5ML
1.25 SOLUTION, CONCENTRATE RESPIRATORY (INHALATION) EVERY 6 HOURS
Refills: 0 | Status: DISCONTINUED | OUTPATIENT
Start: 2023-11-18 | End: 2023-11-28

## 2023-11-18 RX ORDER — AMANTADINE HCL 100 MG
200 CAPSULE ORAL ONCE
Refills: 0 | Status: COMPLETED | OUTPATIENT
Start: 2023-11-18 | End: 2023-11-18

## 2023-11-18 RX ORDER — AMANTADINE HCL 100 MG
200 CAPSULE ORAL ONCE
Refills: 0 | Status: DISCONTINUED | OUTPATIENT
Start: 2023-11-18 | End: 2023-11-18

## 2023-11-18 RX ORDER — ACETYLCYSTEINE 200 MG/ML
4 VIAL (ML) MISCELLANEOUS EVERY 6 HOURS
Refills: 0 | Status: DISCONTINUED | OUTPATIENT
Start: 2023-11-18 | End: 2023-11-23

## 2023-11-18 RX ADMIN — ATORVASTATIN CALCIUM 40 MILLIGRAM(S): 80 TABLET, FILM COATED ORAL at 22:16

## 2023-11-18 RX ADMIN — POLYETHYLENE GLYCOL 3350 17 GRAM(S): 17 POWDER, FOR SOLUTION ORAL at 18:05

## 2023-11-18 RX ADMIN — FONDAPARINUX SODIUM 1.2 MILLIGRAM(S): 2.5 INJECTION, SOLUTION SUBCUTANEOUS at 18:06

## 2023-11-18 RX ADMIN — Medication 3 MILLILITER(S): at 09:11

## 2023-11-18 RX ADMIN — SENNA PLUS 2 TABLET(S): 8.6 TABLET ORAL at 22:16

## 2023-11-18 RX ADMIN — Medication 4 MILLILITER(S): at 15:42

## 2023-11-18 RX ADMIN — CHLORHEXIDINE GLUCONATE 1 APPLICATION(S): 213 SOLUTION TOPICAL at 05:09

## 2023-11-18 RX ADMIN — Medication 4 MILLILITER(S): at 09:19

## 2023-11-18 RX ADMIN — Medication 81 MILLIGRAM(S): at 18:05

## 2023-11-18 RX ADMIN — Medication 10 MILLIGRAM(S): at 06:12

## 2023-11-18 RX ADMIN — CEFEPIME 2000 MILLIGRAM(S): 1 INJECTION, POWDER, FOR SOLUTION INTRAMUSCULAR; INTRAVENOUS at 18:04

## 2023-11-18 RX ADMIN — Medication 4 MILLILITER(S): at 20:37

## 2023-11-18 RX ADMIN — CEFEPIME 2000 MILLIGRAM(S): 1 INJECTION, POWDER, FOR SOLUTION INTRAMUSCULAR; INTRAVENOUS at 05:09

## 2023-11-18 RX ADMIN — LEVALBUTEROL 1.25 MILLIGRAM(S): 1.25 SOLUTION, CONCENTRATE RESPIRATORY (INHALATION) at 15:44

## 2023-11-18 RX ADMIN — SODIUM CHLORIDE 50 MILLILITER(S): 9 INJECTION, SOLUTION INTRAVENOUS at 18:05

## 2023-11-18 RX ADMIN — Medication 90 MILLIGRAM(S): at 05:09

## 2023-11-18 RX ADMIN — LEVALBUTEROL 1.25 MILLIGRAM(S): 1.25 SOLUTION, CONCENTRATE RESPIRATORY (INHALATION) at 20:37

## 2023-11-18 RX ADMIN — Medication 100 MILLIGRAM(S): at 22:16

## 2023-11-18 RX ADMIN — Medication 90 MILLIGRAM(S): at 22:16

## 2023-11-18 RX ADMIN — Medication 200 MILLIGRAM(S): at 12:05

## 2023-11-18 RX ADMIN — Medication 90 MILLIGRAM(S): at 15:00

## 2023-11-18 NOTE — PROGRESS NOTE ADULT - ASSESSMENT
82y M with TBI - R frontal IPH, SDH, tSAH; s/p R decompressive hemicraniectomy 11/10.  Encephalopathy due to TBI, resp. infection, resp. distress, delirium.  Respiratory distress due to impaired bulbar function with difficulty managing secretions, aspiration PNA.  PMH of CAD s/p stents, CHF; HTN, HLD. AAA post EVAR. Parox. A.Fib, on Cardizem. Not on AC now in view of TBI.  PMH of renal cell carcinoma status post left nephrectomy, bladder CA, BPH  Nonoliguric DONNIE, likely ROXANNE, critical illness; improving.  AV vegetation on TTE - unclear etiology. Several BCx negative.  Hypernatremia.   H/o HIT.    Plan:  neurochecks, protected sleep time  d/c cEEG  monitor resp status, wean off HFNC as tolerated;  cont nebs + Mucomyst, aggressive chest PT, suctioning   obtain CXR  cont Diltiazem for rate control and HTN, 90 q8hrs, maintain -150, HR<120  cont ASA 81 daily for CAD  hold TF for now, re-eval in am to restart; ulcer ppx, BM regimen  monitor e-lytes and UOP  restart D5W, FW to 200 q6hrs;  BMP q8hrs  cont Cefepime for Klebs tracheobronchitis vs aspiration PNA, renal dose  SCDs, chemoppx with Fondaparinux, renal dose

## 2023-11-18 NOTE — PROGRESS NOTE ADULT - SUBJECTIVE AND OBJECTIVE BOX
82y M with PMH HTN, CAD s/p stents, renal cell carcinoma status post left nephrectomy, bladder CA, BPH, CHF, LBBB, vertigo,  HLD, 5.5cm AAA without rupture post EVAR, paroxysmal A-fib on Eliquis, Plavix, and aspirin, early stage Alzheimer dementia transferred from [facility] s/p unwitnessed fall with head strike at home found by wife on floor at 8am. Patient brought to urgent care by wife and had 5 staples to posterior scalp but was instructed to go to nearest ED.  CT head at [place] showed R frontal IPH, SDH, SAH at 9:00. CTA stroke protocol not performed at [facility]. Patient BIB on 6L NC.  Pt GCS 15 on arrival, A&Ox2 on arrival. After initial assessment, patient noted to be vomiting enroute to CT scanner.   24hr EVENTS:  11/10 - OR for decompression.  11/11 - Tolerating CPAP  11/12 - CTH with slight worse edema and subgaleal blood collection on personal read. worse exam as below  11/13 - Afebrile with improving WBC. Exam improved in AM. Extubated. sp 1U Plts    O/n events: required re-initiation of HFNC last night.    ROS: [ ]  Unable to assess due to mental status       -----------------------------------------------------------------------------------------------------------------------------------------------------------------------------------    PHYSICAL EXAM:  General: NAd  Neuro:  -Mental status- No acute distress  -CN- PERRL 3mm, EOMI, tongue midline, L facial  - R full strength following commands  - LUE wdrl, LLE wdrl    CV: RRR  Pulm: clear to auscultation  Abd: Soft, nontender, nondistended  Ext: no noted edema in lower ext  Skin: warm, dry      ------------------------------------------------------------------------------------------------------    ICU Vital Signs Last 24 Hrs  T(C): 37.8 (18 Nov 2023 21:00), Max: 37.9 (18 Nov 2023 15:00)  T(F): 100 (18 Nov 2023 21:00), Max: 100.2 (18 Nov 2023 15:00)  HR: 92 (18 Nov 2023 21:00) (78 - 112)  BP: 156/99 (18 Nov 2023 21:00) (93/56 - 170/81)  BP(mean): 97 (18 Nov 2023 21:00) (69 - 147)  ABP: --  ABP(mean): --  RR: 18 (18 Nov 2023 21:00) (14 - 24)  SpO2: 99% (18 Nov 2023 21:00) (93% - 100%)    O2 Parameters below as of 18 Nov 2023 20:40  Patient On (Oxygen Delivery Method): nasal cannula, high flow

## 2023-11-18 NOTE — PROGRESS NOTE ADULT - ASSESSMENT
81 yo male POD#7 s/p a right decompressive hemicraniectomy by Dr. Larios    -Neurochecks q1  -Pain Control, avoid over sedation  -Goal SBP 90 to 160  -Monitor for Airway protection  -Keppra for seizure ppx  -Keep HOB < 30 degrees  -DG this AM if resp issues do not flair up.  -EEG monitoring  -Plan of care to be discussed in AM with neurosurgery attending 81 yo male POD#7 s/p a right decompressive hemicraniectomy by Dr. Larios    -Neurochecks q1  -Pain Control, avoid over sedation  -Goal SBP 90 to 160  -Monitor for Airway protection  -Keppra for seizure ppx  -Keep HOB < 30 degrees  -DG this AM if resp issues do not flair up.  -EEG monitoring  -Free water flushes 150 mL q8h for hypernatremia  -Plan of care to be discussed in AM with neurosurgery attending 83 yo male POD#7 s/p a right decompressive hemicraniectomy by Dr. Larios    -Neurochecks q1  -Pain Control, avoid over sedation  -Goal SBP 90 to 160  -Monitor for Airway protection  -Keppra for seizure ppx  -Keep HOB < 30 degrees  -DG this AM if resp issues do not flair up.  -EEG monitoring  -Free water flushes 150 mL q8h for hypernatremia  -Wean oxygen requirements as tolerated, chest PT  -Plan of care to be discussed in AM with neurosurgery attending

## 2023-11-18 NOTE — PROGRESS NOTE ADULT - SUBJECTIVE AND OBJECTIVE BOX
HPI:  HPI:  82y M with PMH HTN, CAD s/p stents, renal cell carcinoma status post left nephrectomy, bladder CA, BPH, CHF, LBBB, vertigo,  HLD, 5.5cm AAA without rupture post EVAR, paroxysmal A-fib on Eliquis, Plavix, and aspirin, early stage Alzheimer dementia transferred from [facility] s/p unwitnessed fall with head strike at home found by wife on floor at 8am. Patient brought to urgent care by wife and had 5 staples to posterior scalp but was instructed to go to nearest ED.  CT head at [facility] showed R frontal IPH, SDH, SAH at 9:00. CTA stroke protocol not performed at [facility]. Patient BIB on 6L NC.  Pt GCS 15 on arrival, A&Ox2 on arrival. After initial assessment, patient noted to be vomiting enroute to CT scanner.          (10 Nov 2023 11:04)      INTERVAL HPI/OVERNIGHT EVENTS:  83y Male s/p __ seen lying comfortably in bed. Tolerating diet. Passing gas/BM. Voiding. [place] in with __ clear urine. Denies headache, weakness, numbness, n/v/d, fevers, chills, chest pain, SOB.     Vital Signs Last 24 Hrs  T(C): 37.9 (17 Nov 2023 23:00), Max: 38 (17 Nov 2023 18:00)  T(F): 100.2 (17 Nov 2023 23:00), Max: 100.4 (17 Nov 2023 18:00)  HR: 91 (17 Nov 2023 23:00) (53 - 99)  BP: 151/89 (17 Nov 2023 23:00) (104/64 - 170/68)  BP(mean): 104 (17 Nov 2023 23:00) (68 - 106)  RR: 21 (17 Nov 2023 23:00) (16 - 22)  SpO2: 94% (17 Nov 2023 23:00) (92% - 100%)    Parameters below as of 17 Nov 2023 21:17  Patient On (Oxygen Delivery Method): nasal cannula        PHYSICAL EXAM:  GENERAL: NAD, well-groomed  HEAD:  Atraumatic, normocephalic  DRAINS:   WOUND: Dressing clean dry intact  JOEY COMA SCORE: E- V- M- =       E: 4= opens eyes spontaneously 3= to voice 2= to noxious 1= no opening       V: 5= oriented 4= confused 3= inappropriate words 2= incomprehensible sounds 1= nonverbal 1T= intubated       M: 6= follows commands 5= localizes 4= withdraws 3= flexor posturing 2= extensor posturing 1= no movement  MENTAL STATUS: AAO x3; Awake/Comatose; Opens eyes spontaneously/to voice/to light touch/to noxious stimuli; Appropriately conversant without aphasia/Nonverbal; following simple commands/mimicking/not following commands  CRANIAL NERVES: Visual acuity normal for age, visual fields full to confrontation, PERRL. EOMI without nystagmus. Facial sensation intact V1-3 distribution b/l. Face symmetric w/ normal eye closure and smile, tongue midline. Hearing grossly intact. Speech clear. Head turning and shoulder shrug intact.   REFLEXES: PERRL. Corneals intact b/l. Gag intact. Cough intact. Oculocephalic reflex intact (Doll's eye). Negative Jason's b/l. Negative clonus b/l  MOTOR: strength 5/5 b/l upper and lower extremities  Uppers     Delt (C5/6)     Bicep (C5/6)     Wrist Extend (C6)     Tricep (C7)     HG (C8/T1)  R                     5/5                 5/5                         5/5                           5/5                   5/5  L                      5/5                 5/5                         5/5                           5/5                   5/5  Lowers      HF(L1/L2)     KE (L3)     DF (L4)     EHL (L5)     PF (S1)      R                     5/5              5/5           5/5           5/5            5/5  L                     5/5               5/5          5/5            5/5            5/5  SENSATION: grossly intact to light touch all extremities  COORDINATION: Gait intact; rapid alternating movements intact; heel to shin intact; no upper extremity dysmetria  CHEST/LUNG: Clear to auscultation bilaterally; no rales, rhonchi, wheezing, or rubs  HEART: +S1/+S2; Regular rate and rhythm; no murmurs, rubs, or gallops  ABDOMEN: Soft, nontender, nondistended; bowel sounds present all four quadrants  EXTREMITIES:  2+ peripheral pulses, no clubbing, cyanosis, or edema  SKIN: Warm, dry; no rashes or lesions    LABS:                        8.4    12.10 )-----------( 237      ( 17 Nov 2023 04:30 )             26.0     11-17    148<H>  |  111<H>  |  46.1<H>  ----------------------------<  129<H>  4.4   |  24.0  |  1.65<H>    Ca    8.8      17 Nov 2023 20:40  Phos  3.7     11-17  Mg     2.3     11-17    TPro  6.3<L>  /  Alb  3.1<L>  /  TBili  0.7  /  DBili  0.2  /  AST  15  /  ALT  13  /  AlkPhos  69  11-17      Urinalysis Basic - ( 17 Nov 2023 20:40 )    Color: x / Appearance: x / SG: x / pH: x  Gluc: 129 mg/dL / Ketone: x  / Bili: x / Urobili: x   Blood: x / Protein: x / Nitrite: x   Leuk Esterase: x / RBC: x / WBC x   Sq Epi: x / Non Sq Epi: x / Bacteria: x        11-16 @ 07:01 - 11-17 @ 07:00  --------------------------------------------------------  IN: 1735 mL / OUT: 1650 mL / NET: 85 mL    11-17 @ 07:01  -  11-18 @ 00:15  --------------------------------------------------------  IN: 140 mL / OUT: 1150 mL / NET: -1010 mL        RADIOLOGY & ADDITIONAL TESTS:   HPI:  82y M with PMH HTN, CAD s/p stents, renal cell carcinoma status post left nephrectomy, bladder CA, BPH, CHF, LBBB, vertigo,  HLD, 5.5cm AAA without rupture post EVAR, paroxysmal A-fib on Eliquis, Plavix, and aspirin, early stage Alzheimer dementia transferred from [facility] s/p unwitnessed fall with head strike at home found by wife on floor at 8am. Patient brought to urgent care by wife and had 5 staples to posterior scalp but was instructed to go to nearest ED.  CT head at [place] showed R frontal IPH, SDH, SAH at 9:00. CTA stroke protocol not performed at [facility]. Patient BIB on 6L NC.  Pt GCS 15 on arrival, A&Ox2 on arrival. After initial assessment, patient noted to be vomiting enroute to CT scanner (10 Nov 2023 11:04).    INTERVAL HPI/OVERNIGHT EVENTS:    Patient seemed and examined. Pt placed on venti mask for desaturation to 80's. Nasal trumpet placed for nasal suctioning.    Vital Signs Last 24 Hrs  T(C): 37.9 (17 Nov 2023 23:00), Max: 38 (17 Nov 2023 18:00)  T(F): 100.2 (17 Nov 2023 23:00), Max: 100.4 (17 Nov 2023 18:00)  HR: 91 (17 Nov 2023 23:00) (53 - 99)  BP: 151/89 (17 Nov 2023 23:00) (104/64 - 170/68)  BP(mean): 104 (17 Nov 2023 23:00) (68 - 106)  RR: 21 (17 Nov 2023 23:00) (16 - 22)  SpO2: 94% (17 Nov 2023 23:00) (92% - 100%)    Parameters below as of 17 Nov 2023 21:17  Patient On (Oxygen Delivery Method): nasal cannula    PHYSICAL EXAM:  GENERAL: No acute distress, well-developed  HEAD:  Atraumatic, Normocephalic  EYES: EOMI, PERRLA, conjunctiva and sclera clear  NECK: Supple, no lymphadenopathy, no JVD  CHEST/LUNG: Lung sounds clear, diminished at the bases  HEART: Regular rate and rhythm.   ABDOMEN: Soft, non-tender, non-distended  EXTREMITIES:  2+ peripheral pulses b/l, No clubbing, cyanosis, or edema  NEUROLOGY: Arouses to voice, following commands  SKIN: No rashes or lesions      LABS:                        8.4    12.10 )-----------( 237      ( 17 Nov 2023 04:30 )             26.0     11-17    148<H>  |  111<H>  |  46.1<H>  ----------------------------<  129<H>  4.4   |  24.0  |  1.65<H>    Ca    8.8      17 Nov 2023 20:40  Phos  3.7     11-17  Mg     2.3     11-17    TPro  6.3<L>  /  Alb  3.1<L>  /  TBili  0.7  /  DBili  0.2  /  AST  15  /  ALT  13  /  AlkPhos  69  11-17      Urinalysis Basic - ( 17 Nov 2023 20:40 )    Color: x / Appearance: x / SG: x / pH: x  Gluc: 129 mg/dL / Ketone: x  / Bili: x / Urobili: x   Blood: x / Protein: x / Nitrite: x   Leuk Esterase: x / RBC: x / WBC x   Sq Epi: x / Non Sq Epi: x / Bacteria: x        11-16 @ 07:01 - 11-17 @ 07:00  --------------------------------------------------------  IN: 1735 mL / OUT: 1650 mL / NET: 85 mL    11-17 @ 07:01 - 11-18 @ 00:15  --------------------------------------------------------  IN: 140 mL / OUT: 1150 mL / NET: -1010 mL        RADIOLOGY & ADDITIONAL TESTS:   HPI:  82y M with PMH HTN, CAD s/p stents, renal cell carcinoma status post left nephrectomy, bladder CA, BPH, CHF, LBBB, vertigo,  HLD, 5.5cm AAA without rupture post EVAR, paroxysmal A-fib on Eliquis, Plavix, and aspirin, early stage Alzheimer dementia transferred from [facility] s/p unwitnessed fall with head strike at home found by wife on floor at 8am. Patient brought to urgent care by wife and had 5 staples to posterior scalp but was instructed to go to nearest ED.  CT head at [place] showed R frontal IPH, SDH, SAH at 9:00. CTA stroke protocol not performed at [facility]. Patient BIB on 6L NC.  Pt GCS 15 on arrival, A&Ox2 on arrival. After initial assessment, patient noted to be vomiting enroute to CT scanner (10 Nov 2023 11:04).    INTERVAL HPI/OVERNIGHT EVENTS:    Patient seemed and examined. Pt placed on venti mask for desaturation to 80's. Nasal trumpet placed for nasal suctioning.    Vital Signs Last 24 Hrs  T(C): 37.9 (17 Nov 2023 23:00), Max: 38 (17 Nov 2023 18:00)  T(F): 100.2 (17 Nov 2023 23:00), Max: 100.4 (17 Nov 2023 18:00)  HR: 91 (17 Nov 2023 23:00) (53 - 99)  BP: 151/89 (17 Nov 2023 23:00) (104/64 - 170/68)  BP(mean): 104 (17 Nov 2023 23:00) (68 - 106)  RR: 21 (17 Nov 2023 23:00) (16 - 22)  SpO2: 94% (17 Nov 2023 23:00) (92% - 100%)    Parameters below as of 17 Nov 2023 21:17  Patient On (Oxygen Delivery Method): nasal cannula    PHYSICAL EXAM:  GENERAL: No acute distress, well-developed  HEAD:  Atraumatic, Normocephalic  EYES: EOMI, PERRLA, conjunctiva and sclera clear  NECK: Supple, no lymphadenopathy, no JVD  CHEST/LUNG: Lung sounds clear, diminished at the bases  HEART: Regular rate and rhythm.   ABDOMEN: Soft, non-tender, non-distended  EXTREMITIES:  2+ peripheral pulses b/l, No clubbing, cyanosis, LUE +3 edema  NEUROLOGY: Arouses to voice, following commands  SKIN: No rashes or lesions      LABS:                        8.4    12.10 )-----------( 237      ( 17 Nov 2023 04:30 )             26.0     11-17    148<H>  |  111<H>  |  46.1<H>  ----------------------------<  129<H>  4.4   |  24.0  |  1.65<H>    Ca    8.8      17 Nov 2023 20:40  Phos  3.7     11-17  Mg     2.3     11-17    TPro  6.3<L>  /  Alb  3.1<L>  /  TBili  0.7  /  DBili  0.2  /  AST  15  /  ALT  13  /  AlkPhos  69  11-17      Urinalysis Basic - ( 17 Nov 2023 20:40 )    Color: x / Appearance: x / SG: x / pH: x  Gluc: 129 mg/dL / Ketone: x  / Bili: x / Urobili: x   Blood: x / Protein: x / Nitrite: x   Leuk Esterase: x / RBC: x / WBC x   Sq Epi: x / Non Sq Epi: x / Bacteria: x        11-16 @ 07:01 - 11-17 @ 07:00  --------------------------------------------------------  IN: 1735 mL / OUT: 1650 mL / NET: 85 mL    11-17 @ 07:01 - 11-18 @ 00:15  --------------------------------------------------------  IN: 140 mL / OUT: 1150 mL / NET: -1010 mL

## 2023-11-19 LAB
ANION GAP SERPL CALC-SCNC: 11 MMOL/L — SIGNIFICANT CHANGE UP (ref 5–17)
ANION GAP SERPL CALC-SCNC: 11 MMOL/L — SIGNIFICANT CHANGE UP (ref 5–17)
ANION GAP SERPL CALC-SCNC: 15 MMOL/L — SIGNIFICANT CHANGE UP (ref 5–17)
ANION GAP SERPL CALC-SCNC: 15 MMOL/L — SIGNIFICANT CHANGE UP (ref 5–17)
BUN SERPL-MCNC: 46.2 MG/DL — HIGH (ref 8–20)
BUN SERPL-MCNC: 46.2 MG/DL — HIGH (ref 8–20)
BUN SERPL-MCNC: 48.1 MG/DL — HIGH (ref 8–20)
BUN SERPL-MCNC: 48.1 MG/DL — HIGH (ref 8–20)
CALCIUM SERPL-MCNC: 8.9 MG/DL — SIGNIFICANT CHANGE UP (ref 8.4–10.5)
CALCIUM SERPL-MCNC: 8.9 MG/DL — SIGNIFICANT CHANGE UP (ref 8.4–10.5)
CALCIUM SERPL-MCNC: 9.1 MG/DL — SIGNIFICANT CHANGE UP (ref 8.4–10.5)
CALCIUM SERPL-MCNC: 9.1 MG/DL — SIGNIFICANT CHANGE UP (ref 8.4–10.5)
CHLORIDE SERPL-SCNC: 109 MMOL/L — HIGH (ref 96–108)
CHLORIDE SERPL-SCNC: 109 MMOL/L — HIGH (ref 96–108)
CHLORIDE SERPL-SCNC: 111 MMOL/L — HIGH (ref 96–108)
CHLORIDE SERPL-SCNC: 111 MMOL/L — HIGH (ref 96–108)
CO2 SERPL-SCNC: 20 MMOL/L — LOW (ref 22–29)
CO2 SERPL-SCNC: 20 MMOL/L — LOW (ref 22–29)
CO2 SERPL-SCNC: 25 MMOL/L — SIGNIFICANT CHANGE UP (ref 22–29)
CO2 SERPL-SCNC: 25 MMOL/L — SIGNIFICANT CHANGE UP (ref 22–29)
CREAT SERPL-MCNC: 1.51 MG/DL — HIGH (ref 0.5–1.3)
CREAT SERPL-MCNC: 1.51 MG/DL — HIGH (ref 0.5–1.3)
CREAT SERPL-MCNC: 1.56 MG/DL — HIGH (ref 0.5–1.3)
CREAT SERPL-MCNC: 1.56 MG/DL — HIGH (ref 0.5–1.3)
EGFR: 44 ML/MIN/1.73M2 — LOW
EGFR: 44 ML/MIN/1.73M2 — LOW
EGFR: 46 ML/MIN/1.73M2 — LOW
EGFR: 46 ML/MIN/1.73M2 — LOW
GAS PNL BLDA: SIGNIFICANT CHANGE UP
GAS PNL BLDA: SIGNIFICANT CHANGE UP
GLUCOSE SERPL-MCNC: 126 MG/DL — HIGH (ref 70–99)
GLUCOSE SERPL-MCNC: 126 MG/DL — HIGH (ref 70–99)
GLUCOSE SERPL-MCNC: 130 MG/DL — HIGH (ref 70–99)
GLUCOSE SERPL-MCNC: 130 MG/DL — HIGH (ref 70–99)
HCT VFR BLD CALC: 28.9 % — LOW (ref 39–50)
HCT VFR BLD CALC: 28.9 % — LOW (ref 39–50)
HGB BLD-MCNC: 9.5 G/DL — LOW (ref 13–17)
HGB BLD-MCNC: 9.5 G/DL — LOW (ref 13–17)
MAGNESIUM SERPL-MCNC: 2.4 MG/DL — SIGNIFICANT CHANGE UP (ref 1.6–2.6)
MAGNESIUM SERPL-MCNC: 2.4 MG/DL — SIGNIFICANT CHANGE UP (ref 1.6–2.6)
MCHC RBC-ENTMCNC: 31.4 PG — SIGNIFICANT CHANGE UP (ref 27–34)
MCHC RBC-ENTMCNC: 31.4 PG — SIGNIFICANT CHANGE UP (ref 27–34)
MCHC RBC-ENTMCNC: 32.9 GM/DL — SIGNIFICANT CHANGE UP (ref 32–36)
MCHC RBC-ENTMCNC: 32.9 GM/DL — SIGNIFICANT CHANGE UP (ref 32–36)
MCV RBC AUTO: 95.4 FL — SIGNIFICANT CHANGE UP (ref 80–100)
MCV RBC AUTO: 95.4 FL — SIGNIFICANT CHANGE UP (ref 80–100)
PHOSPHATE SERPL-MCNC: 3 MG/DL — SIGNIFICANT CHANGE UP (ref 2.4–4.7)
PHOSPHATE SERPL-MCNC: 3 MG/DL — SIGNIFICANT CHANGE UP (ref 2.4–4.7)
PLATELET # BLD AUTO: 335 K/UL — SIGNIFICANT CHANGE UP (ref 150–400)
PLATELET # BLD AUTO: 335 K/UL — SIGNIFICANT CHANGE UP (ref 150–400)
POTASSIUM SERPL-MCNC: 4.4 MMOL/L — SIGNIFICANT CHANGE UP (ref 3.5–5.3)
POTASSIUM SERPL-MCNC: 4.4 MMOL/L — SIGNIFICANT CHANGE UP (ref 3.5–5.3)
POTASSIUM SERPL-MCNC: 4.5 MMOL/L — SIGNIFICANT CHANGE UP (ref 3.5–5.3)
POTASSIUM SERPL-MCNC: 4.5 MMOL/L — SIGNIFICANT CHANGE UP (ref 3.5–5.3)
POTASSIUM SERPL-SCNC: 4.4 MMOL/L — SIGNIFICANT CHANGE UP (ref 3.5–5.3)
POTASSIUM SERPL-SCNC: 4.4 MMOL/L — SIGNIFICANT CHANGE UP (ref 3.5–5.3)
POTASSIUM SERPL-SCNC: 4.5 MMOL/L — SIGNIFICANT CHANGE UP (ref 3.5–5.3)
POTASSIUM SERPL-SCNC: 4.5 MMOL/L — SIGNIFICANT CHANGE UP (ref 3.5–5.3)
RBC # BLD: 3.03 M/UL — LOW (ref 4.2–5.8)
RBC # BLD: 3.03 M/UL — LOW (ref 4.2–5.8)
RBC # FLD: 12.5 % — SIGNIFICANT CHANGE UP (ref 10.3–14.5)
RBC # FLD: 12.5 % — SIGNIFICANT CHANGE UP (ref 10.3–14.5)
SODIUM SERPL-SCNC: 144 MMOL/L — SIGNIFICANT CHANGE UP (ref 135–145)
SODIUM SERPL-SCNC: 144 MMOL/L — SIGNIFICANT CHANGE UP (ref 135–145)
SODIUM SERPL-SCNC: 147 MMOL/L — HIGH (ref 135–145)
SODIUM SERPL-SCNC: 147 MMOL/L — HIGH (ref 135–145)
WBC # BLD: 15.27 K/UL — HIGH (ref 3.8–10.5)
WBC # BLD: 15.27 K/UL — HIGH (ref 3.8–10.5)
WBC # FLD AUTO: 15.27 K/UL — HIGH (ref 3.8–10.5)
WBC # FLD AUTO: 15.27 K/UL — HIGH (ref 3.8–10.5)

## 2023-11-19 PROCEDURE — 99291 CRITICAL CARE FIRST HOUR: CPT

## 2023-11-19 PROCEDURE — 71045 X-RAY EXAM CHEST 1 VIEW: CPT | Mod: 26

## 2023-11-19 RX ORDER — CEFTRIAXONE 500 MG/1
1000 INJECTION, POWDER, FOR SOLUTION INTRAMUSCULAR; INTRAVENOUS EVERY 24 HOURS
Refills: 0 | Status: COMPLETED | OUTPATIENT
Start: 2023-11-19 | End: 2023-11-20

## 2023-11-19 RX ORDER — FOLIC ACID 0.8 MG
1 TABLET ORAL DAILY
Refills: 0 | Status: DISCONTINUED | OUTPATIENT
Start: 2023-11-19 | End: 2023-11-27

## 2023-11-19 RX ORDER — CEFTRIAXONE 500 MG/1
1000 INJECTION, POWDER, FOR SOLUTION INTRAMUSCULAR; INTRAVENOUS EVERY 24 HOURS
Refills: 0 | Status: DISCONTINUED | OUTPATIENT
Start: 2023-11-19 | End: 2023-11-19

## 2023-11-19 RX ADMIN — Medication 1 MILLIGRAM(S): at 14:04

## 2023-11-19 RX ADMIN — ATORVASTATIN CALCIUM 40 MILLIGRAM(S): 80 TABLET, FILM COATED ORAL at 22:07

## 2023-11-19 RX ADMIN — Medication 4 MILLILITER(S): at 15:05

## 2023-11-19 RX ADMIN — Medication 1 TABLET(S): at 14:03

## 2023-11-19 RX ADMIN — CHLORHEXIDINE GLUCONATE 1 APPLICATION(S): 213 SOLUTION TOPICAL at 06:06

## 2023-11-19 RX ADMIN — SENNA PLUS 2 TABLET(S): 8.6 TABLET ORAL at 22:07

## 2023-11-19 RX ADMIN — Medication 81 MILLIGRAM(S): at 14:04

## 2023-11-19 RX ADMIN — LEVALBUTEROL 1.25 MILLIGRAM(S): 1.25 SOLUTION, CONCENTRATE RESPIRATORY (INHALATION) at 03:16

## 2023-11-19 RX ADMIN — CEFEPIME 2000 MILLIGRAM(S): 1 INJECTION, POWDER, FOR SOLUTION INTRAMUSCULAR; INTRAVENOUS at 06:07

## 2023-11-19 RX ADMIN — FONDAPARINUX SODIUM 1.2 MILLIGRAM(S): 2.5 INJECTION, SOLUTION SUBCUTANEOUS at 18:25

## 2023-11-19 RX ADMIN — Medication 90 MILLIGRAM(S): at 14:03

## 2023-11-19 RX ADMIN — LEVALBUTEROL 1.25 MILLIGRAM(S): 1.25 SOLUTION, CONCENTRATE RESPIRATORY (INHALATION) at 08:49

## 2023-11-19 RX ADMIN — Medication 4 MILLILITER(S): at 20:51

## 2023-11-19 RX ADMIN — Medication 100 MILLIGRAM(S): at 06:07

## 2023-11-19 RX ADMIN — Medication 90 MILLIGRAM(S): at 06:07

## 2023-11-19 RX ADMIN — LEVALBUTEROL 1.25 MILLIGRAM(S): 1.25 SOLUTION, CONCENTRATE RESPIRATORY (INHALATION) at 21:15

## 2023-11-19 RX ADMIN — LEVALBUTEROL 1.25 MILLIGRAM(S): 1.25 SOLUTION, CONCENTRATE RESPIRATORY (INHALATION) at 15:05

## 2023-11-19 RX ADMIN — POLYETHYLENE GLYCOL 3350 17 GRAM(S): 17 POWDER, FOR SOLUTION ORAL at 14:04

## 2023-11-19 RX ADMIN — Medication 4 MILLILITER(S): at 08:49

## 2023-11-19 RX ADMIN — CEFTRIAXONE 1000 MILLIGRAM(S): 500 INJECTION, POWDER, FOR SOLUTION INTRAMUSCULAR; INTRAVENOUS at 14:03

## 2023-11-19 RX ADMIN — Medication 90 MILLIGRAM(S): at 22:08

## 2023-11-19 RX ADMIN — Medication 4 MILLILITER(S): at 03:16

## 2023-11-19 NOTE — PROGRESS NOTE ADULT - SUBJECTIVE AND OBJECTIVE BOX
82y M with PMH HTN, CAD s/p stents, renal cell carcinoma status post left nephrectomy, bladder CA, BPH, CHF, LBBB, vertigo,  HLD, 5.5cm AAA without rupture post EVAR, paroxysmal A-fib on Eliquis, Plavix, and aspirin, early stage Alzheimer dementia transferred from [facility] s/p unwitnessed fall with head strike at home found by wife on floor at 8am. Patient brought to urgent care by wife and had 5 staples to posterior scalp but was instructed to go to nearest ED.  CT head at [place] showed R frontal IPH, SDH, SAH at 9:00. CTA stroke protocol not performed at [facility]. Patient BIB on 6L NC.  Pt GCS 15 on arrival, A&Ox2 on arrival. After initial assessment, patient noted to be vomiting enroute to CT scanner.     24hr EVENTS:  11/10 - OR for decompression.  11/11 - Tolerating CPAP  11/12 - CTH with slight worse edema and subgaleal blood collection on personal read. worse exam as below  11/13 - Afebrile with improving WBC. Exam improved in AM. Extubated. sp 1U Plts    O/n events:  11/19 - continues on [facility]    ROS: Unable to assess due to mental status   -----------------------------------------------------------------------------------------------------------------------------------------------------------------------------------    PHYSICAL EXAM:  General: NAD  Neuro:  -Mental status- No acute distress  -CN- PERRL 3mm, EOMI, tongue midline, L facial  - R full strength following commands  - LUE wdrl, LLE wdrl    CV: RRR  Pulm: clear to auscultation  Abd: Soft, nontender, nondistended  Ext: no noted edema in lower ext  Skin: warm, dry    -----------------------------------------------------------------------------------------------------  ICU Vital Signs Last 24 Hrs  T(C): 37.5 (19 Nov 2023 11:00), Max: 38 (18 Nov 2023 23:00)  T(F): 99.5 (19 Nov 2023 11:00), Max: 100.4 (18 Nov 2023 23:00)  HR: 90 (19 Nov 2023 11:00) (84 - 111)  BP: 134/74 (19 Nov 2023 11:00) (93/56 - 160/88)  BP(mean): 93 (19 Nov 2023 11:00) (69 - 147)  ABP: --  ABP(mean): --  RR: 15 (19 Nov 2023 11:00) (14 - 22)  SpO2: 100% (19 Nov 2023 11:00) (94% - 100%)    O2 Parameters below as of 19 Nov 2023 12:00  Patient On (Oxygen Delivery Method): nasal cannula, high flow  O2 Flow (L/min): 30  O2 Concentration (%): 40        I&O's Summary    18 Nov 2023 07:01  -  19 Nov 2023 07:00  --------------------------------------------------------  IN: 1110 mL / OUT: 950 mL / NET: 160 mL    19 Nov 2023 07:01  -  19 Nov 2023 11:19  --------------------------------------------------------  IN: 100 mL / OUT: 0 mL / NET: 100 mL        MEDICATIONS  (STANDING):  acetylcysteine 20%  Inhalation 4 milliLiter(s) Inhalation every 6 hours  amantadine 100 milliGRAM(s) Oral <User Schedule>  aspirin  chewable 81 milliGRAM(s) Oral daily  atorvastatin 40 milliGRAM(s) Oral at bedtime  cefepime  Injectable.      cefepime  Injectable. 2000 milliGRAM(s) IV Push every 12 hours  chlorhexidine 2% Cloths 1 Application(s) Topical <User Schedule>  dextrose 5%. 1000 milliLiter(s) (50 mL/Hr) IV Continuous <Continuous>  diltiazem    Tablet 90 milliGRAM(s) Oral every 8 hours  fondaparinux Injectable 1.2 milliGRAM(s) SubCutaneous <User Schedule>  levalbuterol Inhalation 1.25 milliGRAM(s) Inhalation every 6 hours  polyethylene glycol 3350 17 Gram(s) Oral daily  senna 2 Tablet(s) Oral at bedtime      RESPIRATORY:        IMAGING:   Recent imaging studies were reviewed.    LAB RESULTS:                    9.5    15.27 )-----------( 335      ( 19 Nov 2023 04:45 )             28.9     11-19    147<H>  |  111<H>  |  48.1<H>  ----------------------------<  130<H>  4.4   |  25.0  |  1.56<H>    Ca    8.9      19 Nov 2023 04:45  Phos  3.0     11-19  Mg     2.4     11-19    TPro  6.3<L>  /  Alb  3.1<L>  /  TBili  0.7  /  DBili  0.2  /  AST  15  /  ALT  13  /  AlkPhos  69  11-17    -----------------------------------------------------------------------------------------------------------------------------------------------------------------------------------

## 2023-11-19 NOTE — PROGRESS NOTE ADULT - ASSESSMENT
83 yo male POD#7 s/p a right decompressive hemicraniectomy by Dr. Larios    -Neurochecks q1  -Pain Control, avoid over sedation  -Goal SBP 90 to 160  -Monitor for Airway protection  -Keppra for seizure ppx  -Keep HOB < 30 degrees  -DG this AM if resp issues do not flair up.  -EEG monitoring  -Free water flushes 150 mL q8h for hypernatremia  -Wean oxygen requirements as tolerated, chest PT  -Plan of care to be discussed in AM with neurosurgery attending 81 yo male POD#7 s/p a right decompressive hemicraniectomy by Dr. Larios    -Neurochecks q1  -Pain Control, avoid over sedation  -Goal SBP 90 to 160  -Monitor for Airway protection  -Keppra for seizure ppx  -Keep HOB < 30 degrees  -EEG monitoring  -Free water flushes 200 mL q8h for hypernatremia, BMP q6h  -Wean oxygen requirements as tolerated, chest PT  -Plan of care to be discussed in AM with neurosurgery attending

## 2023-11-19 NOTE — PROGRESS NOTE ADULT - SUBJECTIVE AND OBJECTIVE BOX
HPI:  HPI:  82y M with PMH HTN, CAD s/p stents, renal cell carcinoma status post left nephrectomy, bladder CA, BPH, CHF, LBBB, vertigo,  HLD, 5.5cm AAA without rupture post EVAR, paroxysmal A-fib on Eliquis, Plavix, and aspirin, early stage Alzheimer dementia transferred from [facility] s/p unwitnessed fall with head strike at home found by wife on floor at 8am. Patient brought to urgent care by wife and had 5 staples to posterior scalp but was instructed to go to nearest ED.  CT head at [place] showed R frontal IPH, SDH, SAH at 9:00. CTA stroke protocol not performed at [facility]. Patient BIB on 6L NC.  Pt GCS 15 on arrival, A&Ox2 on arrival. After initial assessment, patient noted to be vomiting enroute to CT scanner.          (10 Nov 2023 11:04)      INTERVAL HPI/OVERNIGHT EVENTS:  83y Male s/p __ seen lying comfortably in bed. Tolerating diet. Passing gas/BM. Voiding. [place] in with __ clear urine. Denies headache, weakness, numbness, n/v/d, fevers, chills, chest pain, SOB.     Vital Signs Last 24 Hrs  T(C): 37.9 (19 Nov 2023 01:00), Max: 38 (18 Nov 2023 23:00)  T(F): 100.2 (19 Nov 2023 01:00), Max: 100.4 (18 Nov 2023 23:00)  HR: 96 (19 Nov 2023 01:00) (78 - 112)  BP: 119/92 (19 Nov 2023 01:00) (93/56 - 170/81)  BP(mean): 97 (19 Nov 2023 01:00) (69 - 147)  RR: 21 (19 Nov 2023 01:00) (14 - 24)  SpO2: 96% (19 Nov 2023 01:00) (93% - 100%)    Parameters below as of 18 Nov 2023 20:40  Patient On (Oxygen Delivery Method): nasal cannula, high flow        PHYSICAL EXAM:  GENERAL: No acute distress, well-developed  HEAD:  Atraumatic, Normocephalic  EYES: EOMI, PERRLA, conjunctiva and sclera clear  NECK: Supple, no lymphadenopathy, no JVD  CHEST/LUNG: CTAB; No wheezes, rales, or rhonchi  HEART: Regular rate and rhythm. Normal S1/S2. No murmurs, rubs, or gallops  ABDOMEN: Soft, non-tender, non-distended; normal bowel sounds, no organomegaly  EXTREMITIES:  2+ peripheral pulses b/l, No clubbing, cyanosis, or edema  NEUROLOGY: A&O x 3, no focal deficits  SKIN: No rashes or lesions      LABS:                        9.8    13.19 )-----------( 287      ( 18 Nov 2023 04:45 )             29.2     11-18    147<H>  |  113<H>  |  47.1<H>  ----------------------------<  106<H>  4.2   |  22.0  |  1.46<H>    Ca    8.4      18 Nov 2023 18:15  Phos  3.7     11-18  Mg     2.4     11-18    TPro  6.3<L>  /  Alb  3.1<L>  /  TBili  0.7  /  DBili  0.2  /  AST  15  /  ALT  13  /  AlkPhos  69  11-17      Urinalysis Basic - ( 18 Nov 2023 18:15 )    Color: x / Appearance: x / SG: x / pH: x  Gluc: 106 mg/dL / Ketone: x  / Bili: x / Urobili: x   Blood: x / Protein: x / Nitrite: x   Leuk Esterase: x / RBC: x / WBC x   Sq Epi: x / Non Sq Epi: x / Bacteria: x        11-17 @ 07:01 - 11-18 @ 07:00  --------------------------------------------------------  IN: 320 mL / OUT: 1900 mL / NET: -1580 mL    11-18 @ 07:01 - 11-19 @ 01:58  --------------------------------------------------------  IN: 510 mL / OUT: 400 mL / NET: 110 mL           HPI:  82y M with PMH HTN, CAD s/p stents, renal cell carcinoma status post left nephrectomy, bladder CA, BPH, CHF, LBBB, vertigo,  HLD, 5.5cm AAA without rupture post EVAR, paroxysmal A-fib on Eliquis, Plavix, and aspirin, early stage Alzheimer dementia transferred from [facility] s/p unwitnessed fall with head strike at home found by wife on floor at 8am. Patient brought to urgent care by wife and had 5 staples to posterior scalp but was instructed to go to nearest ED.  CT head at [place] showed R frontal IPH, SDH, SAH at 9:00. CTA stroke protocol not performed at [facility]. Patient BIB on 6L NC.  Pt GCS 15 on arrival, A&Ox2 on arrival. After initial assessment, patient noted to be vomiting enroute to CT scanner (10 Nov 2023 11:04)      INTERVAL HPI/OVERNIGHT EVENTS:  Patient seen and examined, in no distress.    Vital Signs Last 24 Hrs  T(C): 37.9 (19 Nov 2023 01:00), Max: 38 (18 Nov 2023 23:00)  T(F): 100.2 (19 Nov 2023 01:00), Max: 100.4 (18 Nov 2023 23:00)  HR: 96 (19 Nov 2023 01:00) (78 - 112)  BP: 119/92 (19 Nov 2023 01:00) (93/56 - 170/81)  BP(mean): 97 (19 Nov 2023 01:00) (69 - 147)  RR: 21 (19 Nov 2023 01:00) (14 - 24)  SpO2: 96% (19 Nov 2023 01:00) (93% - 100%)    Parameters below as of 18 Nov 2023 20:40  Patient On (Oxygen Delivery Method): nasal cannula, high flow    PHYSICAL EXAM:  GENERAL: No acute distress, well-developed  HEAD:  Atraumatic, Normocephalic  EYES: EOMI, PERRLA, conjunctiva and sclera clear  NECK: Supple, no lymphadenopathy,   CHEST/LUNG: Lung sounds clear, diminished at the bases  HEART: Regular rate and rhythm. Normal S1/S2.   ABDOMEN: Soft, non-tender, non-distended  EXTREMITIES:  2+ peripheral pulses b/l, No clubbing, cyanosis, or edema  NEUROLOGY: A&O x 1, no focal deficits, LUE weakness, LLE withdraws to pain  SKIN: No rashes or lesions      LABS:                        9.8    13.19 )-----------( 287      ( 18 Nov 2023 04:45 )             29.2     11-18    147<H>  |  113<H>  |  47.1<H>  ----------------------------<  106<H>  4.2   |  22.0  |  1.46<H>    Ca    8.4      18 Nov 2023 18:15  Phos  3.7     11-18  Mg     2.4     11-18    TPro  6.3<L>  /  Alb  3.1<L>  /  TBili  0.7  /  DBili  0.2  /  AST  15  /  ALT  13  /  AlkPhos  69  11-17      Urinalysis Basic - ( 18 Nov 2023 18:15 )    Color: x / Appearance: x / SG: x / pH: x  Gluc: 106 mg/dL / Ketone: x  / Bili: x / Urobili: x   Blood: x / Protein: x / Nitrite: x   Leuk Esterase: x / RBC: x / WBC x   Sq Epi: x / Non Sq Epi: x / Bacteria: x        11-17 @ 07:01 - 11-18 @ 07:00  --------------------------------------------------------  IN: 320 mL / OUT: 1900 mL / NET: -1580 mL    11-18 @ 07:01 - 11-19 @ 01:58  --------------------------------------------------------  IN: 510 mL / OUT: 400 mL / NET: 110 mL           HPI:  82y M with PMH HTN, CAD s/p stents, renal cell carcinoma status post left nephrectomy, bladder CA, BPH, CHF, LBBB, vertigo,  HLD, 5.5cm AAA without rupture post EVAR, paroxysmal A-fib on Eliquis, Plavix, and aspirin, early stage Alzheimer dementia transferred from [facility] s/p unwitnessed fall with head strike at home found by wife on floor at 8am. Patient brought to urgent care by wife and had 5 staples to posterior scalp but was instructed to go to nearest ED.  CT head at [place] showed R frontal IPH, SDH, SAH at 9:00. CTA stroke protocol not performed at [facility]. Patient BIB on 6L NC.  Pt GCS 15 on arrival, A&Ox2 on arrival. After initial assessment, patient noted to be vomiting enroute to CT scanner (10 Nov 2023 11:04)      INTERVAL HPI/OVERNIGHT EVENTS:  Patient seen and examined, in no distress. Patient currently on high flow nasal canula 40L 40% FiO2, with less secretions and minimal coughing.     Vital Signs Last 24 Hrs  T(C): 37.9 (19 Nov 2023 01:00), Max: 38 (18 Nov 2023 23:00)  T(F): 100.2 (19 Nov 2023 01:00), Max: 100.4 (18 Nov 2023 23:00)  HR: 96 (19 Nov 2023 01:00) (78 - 112)  BP: 119/92 (19 Nov 2023 01:00) (93/56 - 170/81)  BP(mean): 97 (19 Nov 2023 01:00) (69 - 147)  RR: 21 (19 Nov 2023 01:00) (14 - 24)  SpO2: 96% (19 Nov 2023 01:00) (93% - 100%)    Parameters below as of 18 Nov 2023 20:40  Patient On (Oxygen Delivery Method): nasal cannula, high flow    PHYSICAL EXAM:  GENERAL: No acute distress, well-developed  HEAD:  Atraumatic, Normocephalic  EYES: EOMI, PERRLA, conjunctiva and sclera clear  NECK: Supple, no lymphadenopathy,   CHEST/LUNG: Lung sounds clear, diminished at the bases  HEART: Regular rate and rhythm. Normal S1/S2.   ABDOMEN: Soft, non-tender, non-distended  EXTREMITIES:  2+ peripheral pulses b/l, No clubbing, cyanosis, or edema  NEUROLOGY: A&O x 1, no focal deficits, LUE weakness, LLE withdraws to pain  SKIN: No rashes or lesions      LABS:                        9.8    13.19 )-----------( 287      ( 18 Nov 2023 04:45 )             29.2     11-18    147<H>  |  113<H>  |  47.1<H>  ----------------------------<  106<H>  4.2   |  22.0  |  1.46<H>    Ca    8.4      18 Nov 2023 18:15  Phos  3.7     11-18  Mg     2.4     11-18    TPro  6.3<L>  /  Alb  3.1<L>  /  TBili  0.7  /  DBili  0.2  /  AST  15  /  ALT  13  /  AlkPhos  69  11-17      Urinalysis Basic - ( 18 Nov 2023 18:15 )    Color: x / Appearance: x / SG: x / pH: x  Gluc: 106 mg/dL / Ketone: x  / Bili: x / Urobili: x   Blood: x / Protein: x / Nitrite: x   Leuk Esterase: x / RBC: x / WBC x   Sq Epi: x / Non Sq Epi: x / Bacteria: x        11-17 @ 07:01 - 11-18 @ 07:00  --------------------------------------------------------  IN: 320 mL / OUT: 1900 mL / NET: -1580 mL    11-18 @ 07:01 - 11-19 @ 01:58  --------------------------------------------------------  IN: 510 mL / OUT: 400 mL / NET: 110 mL

## 2023-11-19 NOTE — PROGRESS NOTE ADULT - ASSESSMENT
82y M with TBI - R frontal IPH, SDH, tSAH; s/p R decompressive hemicraniectomy 11/10.  Encephalopathy due to TBI, resp. infection, resp. distress, delirium.  Respiratory distress due to impaired bulbar function with difficulty managing secretions, aspiration PNA.  PMH of CAD s/p stents, CHF; HTN, HLD. HIT, AAA post EVAR. Parox. A.Fib, on Cardizem.   PMH of renal cell carcinoma status post left nephrectomy, bladder CA, BPH  Nonoliguric DONNIE, likely ROXANNE, critical illness; improving.  AV vegetation on TTE - unclear etiology. Several BCx negative.  Hypernatremia.     Plan -  Neuro: EEG with epileptiform discharges 11/18  neurochecks q2h, protected sleep time  switch abx to avoid any component of cefepime encephalopathy  amantadine 100mg daily -> inc dose on 11/20  Mobility: PT/OT    Resp:   monitor resp status, wean off HFNC as tolerated  cont nebs + Mucomyst  aggressive chest PT, NT suctioning   ABG, CXR morning    CV:  cont Diltiazem for rate control and HTN, 90 q8hrs, maintain -150, HR<120  cont ASA 81 daily for CAD    GI:  hold TF for now, re-eval in am to restart; ulcer ppx, BM regimen    Renal:   monitor e-lytes and UOP  D5W maintain -> f/u BMP 1PM  FW to 250 q6hrs    ID:   switch to CTX for Klebs tracheobronchitis to avoid cefepime encephalopathy  Trend fever curve and WBC    Heme:  SCDs, chemoppx with Fondaparinux, renal dose     Dispo: Stays in NSICU for continue respiratory monitoring

## 2023-11-20 DIAGNOSIS — I38 ENDOCARDITIS, VALVE UNSPECIFIED: ICD-10-CM

## 2023-11-20 LAB
ANION GAP SERPL CALC-SCNC: 13 MMOL/L — SIGNIFICANT CHANGE UP (ref 5–17)
BASE EXCESS BLDA CALC-SCNC: 1.7 MMOL/L — SIGNIFICANT CHANGE UP (ref -2–3)
BASE EXCESS BLDA CALC-SCNC: 1.7 MMOL/L — SIGNIFICANT CHANGE UP (ref -2–3)
BLOOD GAS COMMENTS ARTERIAL: SIGNIFICANT CHANGE UP
BLOOD GAS COMMENTS ARTERIAL: SIGNIFICANT CHANGE UP
BUN SERPL-MCNC: 41 MG/DL — HIGH (ref 8–20)
BUN SERPL-MCNC: 41 MG/DL — HIGH (ref 8–20)
BUN SERPL-MCNC: 42.6 MG/DL — HIGH (ref 8–20)
BUN SERPL-MCNC: 42.6 MG/DL — HIGH (ref 8–20)
CALCIUM SERPL-MCNC: 8.8 MG/DL — SIGNIFICANT CHANGE UP (ref 8.4–10.5)
CALCIUM SERPL-MCNC: 8.8 MG/DL — SIGNIFICANT CHANGE UP (ref 8.4–10.5)
CALCIUM SERPL-MCNC: 9 MG/DL — SIGNIFICANT CHANGE UP (ref 8.4–10.5)
CALCIUM SERPL-MCNC: 9 MG/DL — SIGNIFICANT CHANGE UP (ref 8.4–10.5)
CHLORIDE SERPL-SCNC: 106 MMOL/L — SIGNIFICANT CHANGE UP (ref 96–108)
CHLORIDE SERPL-SCNC: 106 MMOL/L — SIGNIFICANT CHANGE UP (ref 96–108)
CHLORIDE SERPL-SCNC: 112 MMOL/L — HIGH (ref 96–108)
CHLORIDE SERPL-SCNC: 112 MMOL/L — HIGH (ref 96–108)
CK SERPL-CCNC: 107 U/L — SIGNIFICANT CHANGE UP (ref 30–200)
CK SERPL-CCNC: 107 U/L — SIGNIFICANT CHANGE UP (ref 30–200)
CO2 SERPL-SCNC: 22 MMOL/L — SIGNIFICANT CHANGE UP (ref 22–29)
CO2 SERPL-SCNC: 22 MMOL/L — SIGNIFICANT CHANGE UP (ref 22–29)
CO2 SERPL-SCNC: 24 MMOL/L — SIGNIFICANT CHANGE UP (ref 22–29)
CO2 SERPL-SCNC: 24 MMOL/L — SIGNIFICANT CHANGE UP (ref 22–29)
CREAT SERPL-MCNC: 1.49 MG/DL — HIGH (ref 0.5–1.3)
CREAT SERPL-MCNC: 1.49 MG/DL — HIGH (ref 0.5–1.3)
CREAT SERPL-MCNC: 1.51 MG/DL — HIGH (ref 0.5–1.3)
CREAT SERPL-MCNC: 1.51 MG/DL — HIGH (ref 0.5–1.3)
EGFR: 46 ML/MIN/1.73M2 — LOW
EGFR: 46 ML/MIN/1.73M2 — LOW
EGFR: 47 ML/MIN/1.73M2 — LOW
EGFR: 47 ML/MIN/1.73M2 — LOW
GAS PNL BLDA: SIGNIFICANT CHANGE UP
GAS PNL BLDA: SIGNIFICANT CHANGE UP
GLUCOSE BLDC GLUCOMTR-MCNC: 116 MG/DL — HIGH (ref 70–99)
GLUCOSE BLDC GLUCOMTR-MCNC: 116 MG/DL — HIGH (ref 70–99)
GLUCOSE SERPL-MCNC: 130 MG/DL — HIGH (ref 70–99)
GLUCOSE SERPL-MCNC: 130 MG/DL — HIGH (ref 70–99)
GLUCOSE SERPL-MCNC: 134 MG/DL — HIGH (ref 70–99)
GLUCOSE SERPL-MCNC: 134 MG/DL — HIGH (ref 70–99)
HCO3 BLDA-SCNC: 25 MMOL/L — SIGNIFICANT CHANGE UP (ref 21–28)
HCO3 BLDA-SCNC: 25 MMOL/L — SIGNIFICANT CHANGE UP (ref 21–28)
HCT VFR BLD CALC: 26.2 % — LOW (ref 39–50)
HCT VFR BLD CALC: 26.2 % — LOW (ref 39–50)
HGB BLD-MCNC: 8.9 G/DL — LOW (ref 13–17)
HGB BLD-MCNC: 8.9 G/DL — LOW (ref 13–17)
HOROWITZ INDEX BLDA+IHG-RTO: SIGNIFICANT CHANGE UP
HOROWITZ INDEX BLDA+IHG-RTO: SIGNIFICANT CHANGE UP
MAGNESIUM SERPL-MCNC: 2.3 MG/DL — SIGNIFICANT CHANGE UP (ref 1.6–2.6)
MAGNESIUM SERPL-MCNC: 2.3 MG/DL — SIGNIFICANT CHANGE UP (ref 1.6–2.6)
MCHC RBC-ENTMCNC: 31.9 PG — SIGNIFICANT CHANGE UP (ref 27–34)
MCHC RBC-ENTMCNC: 31.9 PG — SIGNIFICANT CHANGE UP (ref 27–34)
MCHC RBC-ENTMCNC: 34 GM/DL — SIGNIFICANT CHANGE UP (ref 32–36)
MCHC RBC-ENTMCNC: 34 GM/DL — SIGNIFICANT CHANGE UP (ref 32–36)
MCV RBC AUTO: 93.9 FL — SIGNIFICANT CHANGE UP (ref 80–100)
MCV RBC AUTO: 93.9 FL — SIGNIFICANT CHANGE UP (ref 80–100)
PCO2 BLDA: 31 MMHG — LOW (ref 35–48)
PCO2 BLDA: 31 MMHG — LOW (ref 35–48)
PH BLDA: 7.51 — HIGH (ref 7.35–7.45)
PH BLDA: 7.51 — HIGH (ref 7.35–7.45)
PHOSPHATE SERPL-MCNC: 3.2 MG/DL — SIGNIFICANT CHANGE UP (ref 2.4–4.7)
PHOSPHATE SERPL-MCNC: 3.2 MG/DL — SIGNIFICANT CHANGE UP (ref 2.4–4.7)
PLATELET # BLD AUTO: 344 K/UL — SIGNIFICANT CHANGE UP (ref 150–400)
PLATELET # BLD AUTO: 344 K/UL — SIGNIFICANT CHANGE UP (ref 150–400)
PO2 BLDA: 115 MMHG — HIGH (ref 83–108)
PO2 BLDA: 115 MMHG — HIGH (ref 83–108)
POTASSIUM SERPL-MCNC: 3.9 MMOL/L — SIGNIFICANT CHANGE UP (ref 3.5–5.3)
POTASSIUM SERPL-MCNC: 3.9 MMOL/L — SIGNIFICANT CHANGE UP (ref 3.5–5.3)
POTASSIUM SERPL-MCNC: 4 MMOL/L — SIGNIFICANT CHANGE UP (ref 3.5–5.3)
POTASSIUM SERPL-MCNC: 4 MMOL/L — SIGNIFICANT CHANGE UP (ref 3.5–5.3)
POTASSIUM SERPL-SCNC: 3.9 MMOL/L — SIGNIFICANT CHANGE UP (ref 3.5–5.3)
POTASSIUM SERPL-SCNC: 3.9 MMOL/L — SIGNIFICANT CHANGE UP (ref 3.5–5.3)
POTASSIUM SERPL-SCNC: 4 MMOL/L — SIGNIFICANT CHANGE UP (ref 3.5–5.3)
POTASSIUM SERPL-SCNC: 4 MMOL/L — SIGNIFICANT CHANGE UP (ref 3.5–5.3)
RBC # BLD: 2.79 M/UL — LOW (ref 4.2–5.8)
RBC # BLD: 2.79 M/UL — LOW (ref 4.2–5.8)
RBC # FLD: 12.4 % — SIGNIFICANT CHANGE UP (ref 10.3–14.5)
RBC # FLD: 12.4 % — SIGNIFICANT CHANGE UP (ref 10.3–14.5)
SAO2 % BLDA: 100 % — HIGH (ref 94–98)
SAO2 % BLDA: 100 % — HIGH (ref 94–98)
SODIUM SERPL-SCNC: 143 MMOL/L — SIGNIFICANT CHANGE UP (ref 135–145)
SODIUM SERPL-SCNC: 143 MMOL/L — SIGNIFICANT CHANGE UP (ref 135–145)
SODIUM SERPL-SCNC: 147 MMOL/L — HIGH (ref 135–145)
SODIUM SERPL-SCNC: 147 MMOL/L — HIGH (ref 135–145)
TROPONIN T, HIGH SENSITIVITY RESULT: 36 NG/L — SIGNIFICANT CHANGE UP (ref 0–51)
TROPONIN T, HIGH SENSITIVITY RESULT: 36 NG/L — SIGNIFICANT CHANGE UP (ref 0–51)
WBC # BLD: 16.44 K/UL — HIGH (ref 3.8–10.5)
WBC # BLD: 16.44 K/UL — HIGH (ref 3.8–10.5)
WBC # FLD AUTO: 16.44 K/UL — HIGH (ref 3.8–10.5)
WBC # FLD AUTO: 16.44 K/UL — HIGH (ref 3.8–10.5)

## 2023-11-20 PROCEDURE — 93010 ELECTROCARDIOGRAM REPORT: CPT

## 2023-11-20 PROCEDURE — 99233 SBSQ HOSP IP/OBS HIGH 50: CPT

## 2023-11-20 PROCEDURE — 71045 X-RAY EXAM CHEST 1 VIEW: CPT | Mod: 26

## 2023-11-20 PROCEDURE — 93308 TTE F-UP OR LMTD: CPT | Mod: 26

## 2023-11-20 PROCEDURE — 71045 X-RAY EXAM CHEST 1 VIEW: CPT | Mod: 26,77

## 2023-11-20 PROCEDURE — 99291 CRITICAL CARE FIRST HOUR: CPT

## 2023-11-20 PROCEDURE — 74018 RADEX ABDOMEN 1 VIEW: CPT | Mod: 26

## 2023-11-20 RX ORDER — DILTIAZEM HCL 120 MG
60 CAPSULE, EXT RELEASE 24 HR ORAL
Refills: 0 | Status: DISCONTINUED | OUTPATIENT
Start: 2023-11-20 | End: 2023-11-26

## 2023-11-20 RX ORDER — METOPROLOL TARTRATE 50 MG
5 TABLET ORAL ONCE
Refills: 0 | Status: COMPLETED | OUTPATIENT
Start: 2023-11-20 | End: 2023-11-20

## 2023-11-20 RX ORDER — LANOLIN ALCOHOL/MO/W.PET/CERES
3 CREAM (GRAM) TOPICAL AT BEDTIME
Refills: 0 | Status: DISCONTINUED | OUTPATIENT
Start: 2023-11-20 | End: 2023-11-27

## 2023-11-20 RX ORDER — DILTIAZEM HCL 120 MG
90 CAPSULE, EXT RELEASE 24 HR ORAL EVERY 6 HOURS
Refills: 0 | Status: DISCONTINUED | OUTPATIENT
Start: 2023-11-20 | End: 2023-11-20

## 2023-11-20 RX ORDER — METOPROLOL TARTRATE 50 MG
25 TABLET ORAL ONCE
Refills: 0 | Status: COMPLETED | OUTPATIENT
Start: 2023-11-20 | End: 2023-11-20

## 2023-11-20 RX ORDER — METOPROLOL TARTRATE 50 MG
25 TABLET ORAL
Refills: 0 | Status: DISCONTINUED | OUTPATIENT
Start: 2023-11-20 | End: 2023-11-26

## 2023-11-20 RX ADMIN — FONDAPARINUX SODIUM 1.2 MILLIGRAM(S): 2.5 INJECTION, SOLUTION SUBCUTANEOUS at 18:46

## 2023-11-20 RX ADMIN — LEVALBUTEROL 1.25 MILLIGRAM(S): 1.25 SOLUTION, CONCENTRATE RESPIRATORY (INHALATION) at 20:49

## 2023-11-20 RX ADMIN — Medication 4 MILLILITER(S): at 02:57

## 2023-11-20 RX ADMIN — ATORVASTATIN CALCIUM 40 MILLIGRAM(S): 80 TABLET, FILM COATED ORAL at 21:24

## 2023-11-20 RX ADMIN — Medication 4 MILLILITER(S): at 16:49

## 2023-11-20 RX ADMIN — Medication 60 MILLIGRAM(S): at 18:46

## 2023-11-20 RX ADMIN — POLYETHYLENE GLYCOL 3350 17 GRAM(S): 17 POWDER, FOR SOLUTION ORAL at 12:50

## 2023-11-20 RX ADMIN — Medication 90 MILLIGRAM(S): at 05:27

## 2023-11-20 RX ADMIN — Medication 800 MILLIGRAM(S): at 23:02

## 2023-11-20 RX ADMIN — Medication 5 MILLIGRAM(S): at 09:06

## 2023-11-20 RX ADMIN — Medication 25 MILLIGRAM(S): at 12:51

## 2023-11-20 RX ADMIN — Medication 4 MILLILITER(S): at 20:50

## 2023-11-20 RX ADMIN — Medication 3 MILLIGRAM(S): at 21:24

## 2023-11-20 RX ADMIN — Medication 1 TABLET(S): at 12:50

## 2023-11-20 RX ADMIN — LEVALBUTEROL 1.25 MILLIGRAM(S): 1.25 SOLUTION, CONCENTRATE RESPIRATORY (INHALATION) at 02:57

## 2023-11-20 RX ADMIN — Medication 1 MILLIGRAM(S): at 12:50

## 2023-11-20 RX ADMIN — Medication 4 MILLILITER(S): at 08:42

## 2023-11-20 RX ADMIN — CHLORHEXIDINE GLUCONATE 1 APPLICATION(S): 213 SOLUTION TOPICAL at 05:27

## 2023-11-20 RX ADMIN — LEVALBUTEROL 1.25 MILLIGRAM(S): 1.25 SOLUTION, CONCENTRATE RESPIRATORY (INHALATION) at 16:49

## 2023-11-20 RX ADMIN — SENNA PLUS 2 TABLET(S): 8.6 TABLET ORAL at 21:24

## 2023-11-20 RX ADMIN — LEVALBUTEROL 1.25 MILLIGRAM(S): 1.25 SOLUTION, CONCENTRATE RESPIRATORY (INHALATION) at 08:42

## 2023-11-20 RX ADMIN — Medication 100 MILLIGRAM(S): at 05:29

## 2023-11-20 RX ADMIN — CEFTRIAXONE 1000 MILLIGRAM(S): 500 INJECTION, POWDER, FOR SOLUTION INTRAMUSCULAR; INTRAVENOUS at 18:41

## 2023-11-20 RX ADMIN — Medication 81 MILLIGRAM(S): at 12:50

## 2023-11-20 RX ADMIN — Medication 25 MILLIGRAM(S): at 23:05

## 2023-11-20 NOTE — PROGRESS NOTE ADULT - SUBJECTIVE AND OBJECTIVE BOX
[facility]                                                         CARDIOLOGY AT [place] [address]                                                         Telephone: [phone]. Fax:[phone]                                                                             PROGRESS NOTE    Reason for follow up:   Update:   Patient rapid afib/flutter tachy earlier today, given Lopressor IV and then converted to SR with irregular beats noted.   Wenckebach vs PAC's.      Review of symptoms:   Cardiac:  No chest pain. No dyspnea. No palpitations.  Respiratory: no cough. No dyspnea  Gastrointestinal: No diarrhea. No abdominal pain. No bleeding.   Neuro: No focal neuro complaints.    Vitals:  T(C): 37.8 (11-20-23 @ 10:00), Max: 37.9 (11-20-23 @ 04:00)  HR: 85 (11-20-23 @ 10:00) (81 - 155)  BP: 115/63 (11-20-23 @ 10:00) (104/88 - 170/81)  RR: 17 (11-20-23 @ 10:00) (14 - 21)  SpO2: 97% (11-20-23 @ 10:00) (92% - 100%)  I&O's Summary  19 Nov 2023 07:01  -  20 Nov 2023 07:00  --------------------------------------------------------  IN: 1860 mL / OUT: 1150 mL / NET: 710 mL  20 Nov 2023 07:01  -  20 Nov 2023 12:33  --------------------------------------------------------  IN: 210 mL / OUT: 150 mL / NET: 60 mL    Weight (kg): 72.8 (11-19 @ 19:00)    PHYSICAL EXAM:  Appearance: Comfortable. No acute distress  HEENT:  Atraumatic. Normocephalic.  Normal oral mucosa  Neurologic: A & O x 3, no gross focal deficits.  Cardiovascular: Irregular, S1 S2, No murmur, no rubs/gallops. No JVD  Respiratory: Lungs clear to auscultation, unlabored   Gastrointestinal:  Soft, Non-tender, + BS  Lower Extremities: + Peripheral Pulses, No clubbing, cyanosis, or edema  Psychiatry: Patient is calm. No agitation.   Skin: warm and dry.    CURRENT CARDIAC MEDICATIONS:  diltiazem    Tablet 60 milliGRAM(s) Oral <User Schedule>  hydrALAZINE Injectable 10 milliGRAM(s) IV Push every 2 hours PRN  labetalol Injectable 10 milliGRAM(s) IV Push every 2 hours PRN  metoprolol tartrate 25 milliGRAM(s) Oral <User Schedule>  metoprolol tartrate 25 milliGRAM(s) Oral once    CURRENT OTHER MEDICATIONS:  acetylcysteine 20%  Inhalation 4 milliLiter(s) Inhalation every 6 hours  levalbuterol Inhalation 1.25 milliGRAM(s) Inhalation every 6 hours  cefTRIAXone Injectable. 1000 milliGRAM(s) IV Push every 24 hours  acetaminophen   Oral Liquid .. 800 milliGRAM(s) Oral every 6 hours PRN Temp greater or equal to 38C (100.4F)  amantadine 100 milliGRAM(s) Oral <User Schedule>  melatonin 3 milliGRAM(s) Oral at bedtime  polyethylene glycol 3350 17 Gram(s) Oral daily  senna 2 Tablet(s) Oral at bedtime  atorvastatin 40 milliGRAM(s) Oral at bedtime  aspirin  chewable 81 milliGRAM(s) Oral daily  chlorhexidine 2% Cloths 1 Application(s) Topical <User Schedule>  folic acid 1 milliGRAM(s) Oral daily  fondaparinux Injectable 1.2 milliGRAM(s) SubCutaneous <User Schedule>  Nephro-roma 1 Tablet(s) Oral daily      LABS:	 	  ( 14 Nov 2023 14:00 )  Troponin T  X    ,  CPK  169  , CKMB  X    , BNP X        , ( 13 Nov 2023 01:24 )  Troponin T  X    ,  CPK  222<H>, CKMB  X    , BNP X        , ( 10 Nov 2023 11:00 )  Troponin T  X    ,  CPK  87   , CKMB  X    , BNP X                           8.9    16.44 )-----------( 344      ( 20 Nov 2023 01:10 )             26.2     11-20    143  |  106  |  41.0<H>  ----------------------------<  134<H>  3.9   |  24.0  |  1.51<H>    Ca    9.0      20 Nov 2023 07:08  Phos  3.2     11-20  Mg     2.3     11-20      PT/INR/PTT ( 10 Nov 2023 11:00 )                       :                       :      11.5         :       30.8                  .        .                   .              .           .       1.04        .                                       Lipid Profile: Date: 11-14 @ 14:00  Total cholesterol 125; Direct LDL: --; HDL: 31; Triglycerides:181    TELEMETRY:   Sr patel at times.

## 2023-11-20 NOTE — PROGRESS NOTE ADULT - PROBLEM SELECTOR PLAN 1
Patient rapid afib/flutter tachy earlier today, given Lopressor IV and then converted to SR with irregular beats noted.  Wenckebach vs PAC's.  ON aspirin and Arixtra  Ct Metoprolol 25mg BID, Cardizem 60 BID  Wait for MD for finale recommendations Patient rapid afib/flutter tachy earlier today, given Lopressor IV and then converted to SR with irregular beats noted.  Wenckebach vs PAC's.  ON aspirin and Arixtra DVT prophylaxis   Ct Metoprolol 25mg BID, Cardizem 60 BID  Ct telemetry monitoring  Wait for MD for finale recommendations

## 2023-11-20 NOTE — PROGRESS NOTE ADULT - NS ATTEND AMEND GEN_ALL_CORE FT
Neurosurgery Attending Attestation:    Patient seen and examined at bedside. Agree with plan and note as documented above.    84 y/o M s/p R craniectomy for ICH evacuation 11/10/2023, now s/p extubation, oriented to self, not hospital or year, FC in RUE and RLE. Trace movement in LUE and LLE. Continue monitoring for respiratory concerns. Care per [facility].    -Marty Larios MD.

## 2023-11-20 NOTE — PROGRESS NOTE ADULT - SUBJECTIVE AND OBJECTIVE BOX
HPI:  82y M with PMH HTN, CAD s/p stents, renal cell carcinoma status post left nephrectomy, bladder CA, BPH, CHF, LBBB, vertigo,  HLD, 5.5cm AAA without rupture post EVAR, paroxysmal A-fib on Eliquis, Plavix, and aspirin, early stage Alzheimer dementia transferred from [facility] s/p unwitnessed fall with head strike at home found by wife on floor at 8am. Patient brought to urgent care by wife and had 5 staples to posterior scalp but was instructed to go to nearest ED.  CT head at [place] showed R frontal IPH, SDH, SAH at 9:00. CTA stroke protocol not performed at [facility]. Patient BIB on 6L NC.  Pt GCS 15 on arrival, A&Ox2 on arrival. After initial assessment, patient noted to be vomiting enroute to CT scanner.          (10 Nov 2023 11:04)      INTERVAL HPI/OVERNIGHT EVENTS:  83y Male s/p R hemicraniectomy POD#10  seen lying comfortably in bed. Tolerating diet. Passing gas/BM. Voiding. [place] in with __ clear urine. Denies headache, weakness, numbness, n/v/d, fevers, chills, chest pain, SOB.     Vital Signs Last 24 Hrs  T(C): 37.8 (20 Nov 2023 02:00), Max: 37.8 (19 Nov 2023 23:00)  T(F): 100 (20 Nov 2023 02:00), Max: 100 (19 Nov 2023 23:00)  HR: 108 (20 Nov 2023 02:00) (77 - 119)  BP: 137/78 (20 Nov 2023 02:00) (113/62 - 170/81)  BP(mean): 97 (20 Nov 2023 02:00) (71 - 106)  RR: 19 (20 Nov 2023 02:00) (15 - 20)  SpO2: 95% (20 Nov 2023 02:00) (95% - 100%)    Parameters below as of 20 Nov 2023 00:00  Patient On (Oxygen Delivery Method): nasal cannula  O2 Flow (L/min): 6      PHYSICAL EXAM:  GENERAL: NAD, well-groomed  HEAD:  Atraumatic, normocephalic  DRAINS:   WOUND: Dressing clean dry intact  JOEY COMA SCORE: E- V- M- =       E: 4= opens eyes spontaneously 3= to voice 2= to noxious 1= no opening       V: 5= oriented 4= confused 3= inappropriate words 2= incomprehensible sounds 1= nonverbal 1T= intubated       M: 6= follows commands 5= localizes 4= withdraws 3= flexor posturing 2= extensor posturing 1= no movement  MENTAL STATUS: AAO x3; Awake/Comatose; Opens eyes spontaneously/to voice/to light touch/to noxious stimuli; Appropriately conversant without aphasia/Nonverbal; following simple commands/mimicking/not following commands  CRANIAL NERVES: Visual acuity normal for age, visual fields full to confrontation, PERRL. EOMI without nystagmus. Facial sensation intact V1-3 distribution b/l. Face symmetric w/ normal eye closure and smile, tongue midline. Hearing grossly intact. Speech clear. Head turning and shoulder shrug intact.   REFLEXES: PERRL. Corneals intact b/l. Gag intact. Cough intact. Oculocephalic reflex intact (Doll's eye). Negative Jason's b/l. Negative clonus b/l  MOTOR: strength 5/5 b/l upper and lower extremities  Uppers     Delt (C5/6)     Bicep (C5/6)     Wrist Extend (C6)     Tricep (C7)     HG (C8/T1)  R                     5/5                 5/5                         5/5                           5/5                   5/5  L                      5/5                 5/5                         5/5                           5/5                   5/5  Lowers      HF(L1/L2)     KE (L3)     DF (L4)     EHL (L5)     PF (S1)      R                     5/5              5/5           5/5           5/5            5/5  L                     5/5               5/5          5/5            5/5            5/5  SENSATION: grossly intact to light touch all extremities  COORDINATION: Gait intact; rapid alternating movements intact; heel to shin intact; no upper extremity dysmetria  CHEST/LUNG: Clear to auscultation bilaterally; no rales, rhonchi, wheezing, or rubs  HEART: +S1/+S2; Regular rate and rhythm; no murmurs, rubs, or gallops  ABDOMEN: Soft, nontender, nondistended; bowel sounds present all four quadrants  EXTREMITIES:  2+ peripheral pulses, no clubbing, cyanosis, or edema  SKIN: Warm, dry; no rashes or lesions    LABS:                        8.9    16.44 )-----------( 344      ( 20 Nov 2023 01:10 )             26.2     11-20    147<H>  |  112<H>  |  42.6<H>  ----------------------------<  130<H>  4.0   |  22.0  |  1.49<H>    Ca    8.8      20 Nov 2023 01:10  Phos  3.2     11-20  Mg     2.3     11-20        Urinalysis Basic - ( 20 Nov 2023 01:10 )    Color: x / Appearance: x / SG: x / pH: x  Gluc: 130 mg/dL / Ketone: x  / Bili: x / Urobili: x   Blood: x / Protein: x / Nitrite: x   Leuk Esterase: x / RBC: x / WBC x   Sq Epi: x / Non Sq Epi: x / Bacteria: x        11-18 @ 07:01 - 11-19 @ 07:00  --------------------------------------------------------  IN: 1110 mL / OUT: 950 mL / NET: 160 mL    11-19 @ 07:01  -  11-20 @ 03:10  --------------------------------------------------------  IN: 1100 mL / OUT: 250 mL / NET: 850 mL        RADIOLOGY & ADDITIONAL TESTS:   HPI:  82y M with PMH HTN, CAD s/p stents, renal cell carcinoma status post left nephrectomy, bladder CA, BPH, CHF, LBBB, vertigo,  HLD, 5.5cm AAA without rupture post EVAR, paroxysmal A-fib on Eliquis, Plavix, and aspirin, early stage Alzheimer dementia transferred from [facility] s/p unwitnessed fall with head strike at home found by wife on floor at 8am. Patient brought to urgent care by wife and had 5 staples to posterior scalp but was instructed to go to nearest ED.  CT head at [place] showed R frontal IPH, SDH, SAH at 9:00. CTA stroke protocol not performed at [facility]. Patient BIB on 6L NC.  Pt GCS 15 on arrival, A&Ox2 on arrival. After initial assessment, patient noted to be vomiting enroute to CT scanner.          (10 Nov 2023 11:04)      INTERVAL HPI/OVERNIGHT EVENTS:  83y Male s/p R hemicraniectomy POD#10 seen lying comfortably in bed. Patient requesting to be suctioned by nodding to questions. More alert. Neuro exam remains stable.     Vital Signs Last 24 Hrs  T(C): 37.8 (20 Nov 2023 02:00), Max: 37.8 (19 Nov 2023 23:00)  T(F): 100 (20 Nov 2023 02:00), Max: 100 (19 Nov 2023 23:00)  HR: 108 (20 Nov 2023 02:00) (77 - 119)  BP: 137/78 (20 Nov 2023 02:00) (113/62 - 170/81)  BP(mean): 97 (20 Nov 2023 02:00) (71 - 106)  RR: 19 (20 Nov 2023 02:00) (15 - 20)  SpO2: 95% (20 Nov 2023 02:00) (95% - 100%)    Parameters below as of 20 Nov 2023 00:00  Patient On (Oxygen Delivery Method): nasal cannula  O2 Flow (L/min): 6      PHYSICAL EXAM:  General: NAD  Head/Incision: c/d/i. Flap soft but full  Neuro:  -Mental status- No acute distress. AOx2 (person/place with options, gives thumbs up)  -CN- PERRL 3mm, EOMI, tongue midline, L facial  - R full strength following commands  - LUE 0/5, LLE trace wdrl    CV: Irregular RR  Pulm: clear to auscultation  Abd: Soft, nontender, nondistended  Ext: no noted edema in lower ext  Skin: warm, dry    LABS:                        8.9    16.44 )-----------( 344      ( 20 Nov 2023 01:10 )             26.2     11-20    147<H>  |  112<H>  |  42.6<H>  ----------------------------<  130<H>  4.0   |  22.0  |  1.49<H>    Ca    8.8      20 Nov 2023 01:10  Phos  3.2     11-20  Mg     2.3     11-20        Urinalysis Basic - ( 20 Nov 2023 01:10 )    Color: x / Appearance: x / SG: x / pH: x  Gluc: 130 mg/dL / Ketone: x  / Bili: x / Urobili: x   Blood: x / Protein: x / Nitrite: x   Leuk Esterase: x / RBC: x / WBC x   Sq Epi: x / Non Sq Epi: x / Bacteria: x        11-18 @ 07:01 - 11-19 @ 07:00  --------------------------------------------------------  IN: 1110 mL / OUT: 950 mL / NET: 160 mL    11-19 @ 07:01 - 11-20 @ 03:10  --------------------------------------------------------  IN: 1100 mL / OUT: 250 mL / NET: 850 mL        RADIOLOGY & ADDITIONAL TESTS:  reviewed

## 2023-11-20 NOTE — PROGRESS NOTE ADULT - NS ATTEND AMEND GEN_ALL_CORE FT
Patient seen and examined by me. Seen in presence of: n/a. 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 Jose Anderson in [place] last seen 4/2023, presents with unwitnessed fall with traumatic SDH/SAH POD#2 from R-decompressive hemicraniectomy remains intubated, noted febrile episodes yesterday with leukocytosis, blood cultures sent, TTE done with reports AV lesion    Mobitz block secondary to intracranial pressure  Unwitnessed fall  Tramautic SDH/SAH  post op R decompressive hemicraniectomy  Stage B valvular heart disease: Moderate AS  L renal cell carcinoma s/p nephrectomy  AAA s/p EVAR 2018  hx of coronary artery disease s/p RICHARDSON to LAD/OM on plavix  hypertension    T(C): 37.8 (11-20-23 @ 10:00), Max: 37.9 (11-20-23 @ 04:00)  HR: 88 (11-20-23 @ 19:00) (74 - 155)  BP: 131/109 (11-20-23 @ 19:00) (104/88 - 160/80)  RR: 20 (11-20-23 @ 19:00) (14 - 21)  SpO2: 100% (11-20-23 @ 19:00) (92% - 100%)  Patient intubated  Chest: Bilateral Clear BS  Cardiac: S1 and S2, systolic ejection murmur  Abdomen: Soft      Cardiology consulted for AV block seen in NSICU post decompressive hemicraniectomy  appears to be mobitz block, not high grade according to telemetry and ekg  repeat EKG shows SVT    prior TTE concerning for vegetation vs. poorly visualized benign structure.  Recommend repeat TTE      Plan:  -repeat limited 2D TTE to evaluate valves  -monitor on telemetry, no role for EP intervention at this time, if patient continues to have AV block to SVT episodes may consider EP evaluation consideration at that time. Continue with AV blockers at this time.    please call with concerns.    I have discussed my recommendation with the ACP which are outlined above. Thank you for allowing me to participate in the care of this patient.

## 2023-11-20 NOTE — PROGRESS NOTE ADULT - ASSESSMENT
82y M with TBI - R frontal IPH, SDH, tSAH; s/p R decompressive hemicraniectomy 11/10.  Encephalopathy due to TBI, resp. infection, resp. distress, delirium.  Respiratory distress due to impaired bulbar function with difficulty managing secretions, aspiration PNA.  PMH of CAD s/p stents, CHF; HTN, HLD. HIT, AAA post EVAR. Parox. A.Fib, on Cardizem.   PMH of renal cell carcinoma status post left nephrectomy, bladder CA, BPH  Nonoliguric DONNIE, likely ROXANNE, critical illness; improving.  AV vegetation on TTE - unclear etiology. Several BCx negative.  Hypernatremia.     Plan -  Neuro: EEG with epileptiform discharges 11/18  neurochecks q2h, protected sleep time  switch abx to avoid any component of cefepime encephalopathy  amantadine 100mg daily, renally dosed  melatonin at night  Mobility: PT/OT    Resp:   monitor resp status, wean off HFNC as tolerated  cont nebs + Mucomyst  aggressive chest PT, NT suctioning   CXR morning    CV:  cardiology consulted  maintain -150, HR<120  metoprolol 25mg BID, cardizem 60 BID  cont ASA 81 daily for CAD    GI:  hold TF for now, re-eval in am to restart; ulcer ppx, BM regimen    Renal:   monitor e-lytes and UOP  FWF to 250 q6hrs    ID:   switch to CTX for Klebs tracheobronchitis to avoid cefepime encephalopathy  Trend fever curve and WBC    Heme:  SCDs, chemoppx with Fondaparinux, renal dose     Dispo: Stays in NSICU for continue respiratory monitoring

## 2023-11-20 NOTE — PROGRESS NOTE ADULT - SUBJECTIVE AND OBJECTIVE BOX
NSICU ATTENDING PROGRESS NOTE    Historical Review:  82y M with PMH HTN, CAD s/p stents, renal cell carcinoma status post left nephrectomy, bladder CA, BPH, CHF, LBBB, vertigo,  HLD, 5.5cm AAA without rupture post EVAR, paroxysmal A-fib on Eliquis, Plavix, and aspirin, early stage Alzheimer dementia transferred from [facility] s/p unwitnessed fall with head strike at home found by wife on floor at 8am. Patient brought to urgent care by wife and had 5 staples to posterior scalp but was instructed to go to nearest ED.  CT head at [facility] showed R frontal IPH, SDH, SAH at 9:00. CTA stroke protocol not performed at [facility]. Patient BIB on 6L NC.  Pt GCS 15 on arrival, A&Ox2 on arrival. After initial assessment, patient noted to be vomiting enroute to CT scanner.     24hr EVENTS:  11/10 - OR for decompression.  11/11 - Tolerating CPAP  11/12 - CTH with slight worse edema and subgaleal blood collection on personal read. worse exam as below  11/13 - Afebrile with improving WBC. Exam improved in AM. Extubated. sp 1U Plts    O/n events:  11/19 - continues on HFNC  11/20 - tolerated transition to NC, had episode of sustained tachycardia initially appeared 2nd degree AV block -> then aflutter, cards consulted, broke with metoprolol 5mg    ROS: Unable to fully assess due to mental status   -----------------------------------------------------------------------------------------------------------------------------------------------------------------------------------    PHYSICAL EXAM:  General: NAD  Neuro:  -Mental status- No acute distress  -CN- PERRL 3mm, EOMI, tongue midline, L facial  - R full strength following commands  - LUE wdrl, LLE wdrl    CV: tachycardia improving  Pulm: clear to auscultation  Abd: Soft, nontender, nondistended; LBM 11/19  Ext: no noted edema in lower ext  Skin: warm, dry    -----------------------------------------------------------------------------------------------------  ICU Vital Signs Last 24 Hrs  T(C): 37.7 (20 Nov 2023 09:30), Max: 37.9 (20 Nov 2023 04:00)  T(F): 99.9 (20 Nov 2023 09:30), Max: 100.2 (20 Nov 2023 04:00)  HR: 81 (20 Nov 2023 09:30) (77 - 155)  BP: 156/86 (20 Nov 2023 09:30) (104/88 - 170/81)  BP(mean): 85 (20 Nov 2023 09:00) (63 - 106)  ABP: --  ABP(mean): --  RR: 14 (20 Nov 2023 09:30) (14 - 21)  SpO2: 95% (20 Nov 2023 09:30) (92% - 100%)    O2 Parameters below as of 20 Nov 2023 09:03  Patient On (Oxygen Delivery Method): nasal cannula            I&O's Summary    19 Nov 2023 07:01  -  20 Nov 2023 07:00  --------------------------------------------------------  IN: 1860 mL / OUT: 1150 mL / NET: 710 mL    20 Nov 2023 07:01  -  20 Nov 2023 10:07  --------------------------------------------------------  IN: 210 mL / OUT: 150 mL / NET: 60 mL        MEDICATIONS  (STANDING):  acetylcysteine 20%  Inhalation 4 milliLiter(s) Inhalation every 6 hours  amantadine 100 milliGRAM(s) Oral <User Schedule>  aspirin  chewable 81 milliGRAM(s) Oral daily  atorvastatin 40 milliGRAM(s) Oral at bedtime  cefTRIAXone Injectable. 1000 milliGRAM(s) IV Push every 24 hours  chlorhexidine 2% Cloths 1 Application(s) Topical <User Schedule>  dextrose 5%. 1000 milliLiter(s) (50 mL/Hr) IV Continuous <Continuous>  diltiazem    Tablet 90 milliGRAM(s) Oral every 6 hours  folic acid 1 milliGRAM(s) Oral daily  fondaparinux Injectable 1.2 milliGRAM(s) SubCutaneous <User Schedule>  levalbuterol Inhalation 1.25 milliGRAM(s) Inhalation every 6 hours  metoprolol tartrate 25 milliGRAM(s) Oral once  Nephro-roma 1 Tablet(s) Oral daily  polyethylene glycol 3350 17 Gram(s) Oral daily  senna 2 Tablet(s) Oral at bedtime    IMAGING:   Recent imaging studies were reviewed.    LAB RESULTS:                        8.9    16.44 )-----------( 344      ( 20 Nov 2023 01:10 )             26.2     11-20    143  |  106  |  41.0<H>  ----------------------------<  134<H>  3.9   |  24.0  |  1.51<H>    Ca    9.0      20 Nov 2023 07:08  Phos  3.2     11-20  Mg     2.3     11-20      ABG - ( 20 Nov 2023 00:00 )  pH, Arterial: 7.510 pH, Blood: x     /  pCO2: 31    /  pO2: 115   / HCO3: 25    / Base Excess: 1.7   /  SaO2: 100.0     -----------------------------------------------------------------------------------------------------------------------------------------------------------------------------------

## 2023-11-20 NOTE — PROGRESS NOTE ADULT - PROBLEM SELECTOR PLAN 2
Patient with concern for artifact vs mobile calcification vs vegetation on TTE from 11/13/2023  Repeat limited TTE

## 2023-11-20 NOTE — PROGRESS NOTE ADULT - ASSESSMENT
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 Jose Anderson in [place] last seen 4/2023, presents with unwitnessed fall with traumatic SDH/SAH POD#2 from R-decompressive hemicraniectomy remains intubated, noted febrile episodes yesterday with leukocytosis, blood cultures sent, TTE done with reports AV lesion

## 2023-11-20 NOTE — PROGRESS NOTE ADULT - ASSESSMENT
81 yo male POD#10 s/p a right decompressive hemicraniectomy by Dr. Larios    -Neurochecks q2  -Pain Control, avoid over sedation  -Goal  to 160  -Flap remains full but soft, timing of cranioplasty TBD  -Monitor for Airway protection  -Keep HOB < 30 degrees  -EEG negative. AED ppx d/c'd 7 days post op  -Free water flushes 200 mL q6h for hypernatremia, BMP q12h  -DVT ppx with SCD and fondaparinux (h/o HIT)  -Wean oxygen requirements as tolerated, chest PT  -Medical management per NSICU team   -Plan of care to be discussed in AM with neurosurgery attending

## 2023-11-21 LAB
ANION GAP SERPL CALC-SCNC: 15 MMOL/L — SIGNIFICANT CHANGE UP (ref 5–17)
ANION GAP SERPL CALC-SCNC: 15 MMOL/L — SIGNIFICANT CHANGE UP (ref 5–17)
BUN SERPL-MCNC: 42.4 MG/DL — HIGH (ref 8–20)
BUN SERPL-MCNC: 42.4 MG/DL — HIGH (ref 8–20)
CALCIUM SERPL-MCNC: 9.4 MG/DL — SIGNIFICANT CHANGE UP (ref 8.4–10.5)
CALCIUM SERPL-MCNC: 9.4 MG/DL — SIGNIFICANT CHANGE UP (ref 8.4–10.5)
CHLORIDE SERPL-SCNC: 107 MMOL/L — SIGNIFICANT CHANGE UP (ref 96–108)
CHLORIDE SERPL-SCNC: 107 MMOL/L — SIGNIFICANT CHANGE UP (ref 96–108)
CO2 SERPL-SCNC: 21 MMOL/L — LOW (ref 22–29)
CO2 SERPL-SCNC: 21 MMOL/L — LOW (ref 22–29)
CREAT SERPL-MCNC: 1.43 MG/DL — HIGH (ref 0.5–1.3)
CREAT SERPL-MCNC: 1.43 MG/DL — HIGH (ref 0.5–1.3)
EGFR: 49 ML/MIN/1.73M2 — LOW
EGFR: 49 ML/MIN/1.73M2 — LOW
GLUCOSE BLDC GLUCOMTR-MCNC: 121 MG/DL — HIGH (ref 70–99)
GLUCOSE BLDC GLUCOMTR-MCNC: 121 MG/DL — HIGH (ref 70–99)
GLUCOSE SERPL-MCNC: 138 MG/DL — HIGH (ref 70–99)
GLUCOSE SERPL-MCNC: 138 MG/DL — HIGH (ref 70–99)
HCT VFR BLD CALC: 27 % — LOW (ref 39–50)
HCT VFR BLD CALC: 27 % — LOW (ref 39–50)
HGB BLD-MCNC: 9.1 G/DL — LOW (ref 13–17)
HGB BLD-MCNC: 9.1 G/DL — LOW (ref 13–17)
MAGNESIUM SERPL-MCNC: 2.2 MG/DL — SIGNIFICANT CHANGE UP (ref 1.6–2.6)
MAGNESIUM SERPL-MCNC: 2.2 MG/DL — SIGNIFICANT CHANGE UP (ref 1.6–2.6)
MCHC RBC-ENTMCNC: 31.8 PG — SIGNIFICANT CHANGE UP (ref 27–34)
MCHC RBC-ENTMCNC: 31.8 PG — SIGNIFICANT CHANGE UP (ref 27–34)
MCHC RBC-ENTMCNC: 33.7 GM/DL — SIGNIFICANT CHANGE UP (ref 32–36)
MCHC RBC-ENTMCNC: 33.7 GM/DL — SIGNIFICANT CHANGE UP (ref 32–36)
MCV RBC AUTO: 94.4 FL — SIGNIFICANT CHANGE UP (ref 80–100)
MCV RBC AUTO: 94.4 FL — SIGNIFICANT CHANGE UP (ref 80–100)
PHOSPHATE SERPL-MCNC: 3.7 MG/DL — SIGNIFICANT CHANGE UP (ref 2.4–4.7)
PHOSPHATE SERPL-MCNC: 3.7 MG/DL — SIGNIFICANT CHANGE UP (ref 2.4–4.7)
PLATELET # BLD AUTO: 368 K/UL — SIGNIFICANT CHANGE UP (ref 150–400)
PLATELET # BLD AUTO: 368 K/UL — SIGNIFICANT CHANGE UP (ref 150–400)
POTASSIUM SERPL-MCNC: 4.3 MMOL/L — SIGNIFICANT CHANGE UP (ref 3.5–5.3)
POTASSIUM SERPL-MCNC: 4.3 MMOL/L — SIGNIFICANT CHANGE UP (ref 3.5–5.3)
POTASSIUM SERPL-SCNC: 4.3 MMOL/L — SIGNIFICANT CHANGE UP (ref 3.5–5.3)
POTASSIUM SERPL-SCNC: 4.3 MMOL/L — SIGNIFICANT CHANGE UP (ref 3.5–5.3)
RBC # BLD: 2.86 M/UL — LOW (ref 4.2–5.8)
RBC # BLD: 2.86 M/UL — LOW (ref 4.2–5.8)
RBC # FLD: 12.4 % — SIGNIFICANT CHANGE UP (ref 10.3–14.5)
RBC # FLD: 12.4 % — SIGNIFICANT CHANGE UP (ref 10.3–14.5)
SODIUM SERPL-SCNC: 143 MMOL/L — SIGNIFICANT CHANGE UP (ref 135–145)
SODIUM SERPL-SCNC: 143 MMOL/L — SIGNIFICANT CHANGE UP (ref 135–145)
WBC # BLD: 27.82 K/UL — HIGH (ref 3.8–10.5)
WBC # BLD: 27.82 K/UL — HIGH (ref 3.8–10.5)
WBC # FLD AUTO: 27.82 K/UL — HIGH (ref 3.8–10.5)
WBC # FLD AUTO: 27.82 K/UL — HIGH (ref 3.8–10.5)

## 2023-11-21 PROCEDURE — 99291 CRITICAL CARE FIRST HOUR: CPT

## 2023-11-21 PROCEDURE — 93970 EXTREMITY STUDY: CPT | Mod: 26

## 2023-11-21 PROCEDURE — 99232 SBSQ HOSP IP/OBS MODERATE 35: CPT

## 2023-11-21 PROCEDURE — 99223 1ST HOSP IP/OBS HIGH 75: CPT

## 2023-11-21 RX ORDER — MEROPENEM 1 G/30ML
1000 INJECTION INTRAVENOUS ONCE
Refills: 0 | Status: COMPLETED | OUTPATIENT
Start: 2023-11-21 | End: 2023-11-21

## 2023-11-21 RX ORDER — MEROPENEM 1 G/30ML
INJECTION INTRAVENOUS
Refills: 0 | Status: DISCONTINUED | OUTPATIENT
Start: 2023-11-21 | End: 2023-11-21

## 2023-11-21 RX ORDER — MEROPENEM 1 G/30ML
1000 INJECTION INTRAVENOUS EVERY 12 HOURS
Refills: 0 | Status: COMPLETED | OUTPATIENT
Start: 2023-11-22 | End: 2023-11-28

## 2023-11-21 RX ORDER — MEROPENEM 1 G/30ML
INJECTION INTRAVENOUS
Refills: 0 | Status: COMPLETED | OUTPATIENT
Start: 2023-11-21 | End: 2023-11-29

## 2023-11-21 RX ADMIN — Medication 3 MILLIGRAM(S): at 21:19

## 2023-11-21 RX ADMIN — Medication 60 MILLIGRAM(S): at 05:42

## 2023-11-21 RX ADMIN — SENNA PLUS 2 TABLET(S): 8.6 TABLET ORAL at 21:20

## 2023-11-21 RX ADMIN — ATORVASTATIN CALCIUM 40 MILLIGRAM(S): 80 TABLET, FILM COATED ORAL at 21:20

## 2023-11-21 RX ADMIN — LEVALBUTEROL 1.25 MILLIGRAM(S): 1.25 SOLUTION, CONCENTRATE RESPIRATORY (INHALATION) at 08:51

## 2023-11-21 RX ADMIN — Medication 4 MILLILITER(S): at 15:25

## 2023-11-21 RX ADMIN — Medication 4 MILLILITER(S): at 03:23

## 2023-11-21 RX ADMIN — Medication 800 MILLIGRAM(S): at 00:02

## 2023-11-21 RX ADMIN — FONDAPARINUX SODIUM 1.2 MILLIGRAM(S): 2.5 INJECTION, SOLUTION SUBCUTANEOUS at 18:00

## 2023-11-21 RX ADMIN — Medication 81 MILLIGRAM(S): at 14:01

## 2023-11-21 RX ADMIN — POLYETHYLENE GLYCOL 3350 17 GRAM(S): 17 POWDER, FOR SOLUTION ORAL at 14:01

## 2023-11-21 RX ADMIN — Medication 60 MILLIGRAM(S): at 19:18

## 2023-11-21 RX ADMIN — Medication 100 MILLIGRAM(S): at 05:45

## 2023-11-21 RX ADMIN — Medication 25 MILLIGRAM(S): at 14:01

## 2023-11-21 RX ADMIN — LEVALBUTEROL 1.25 MILLIGRAM(S): 1.25 SOLUTION, CONCENTRATE RESPIRATORY (INHALATION) at 15:25

## 2023-11-21 RX ADMIN — LEVALBUTEROL 1.25 MILLIGRAM(S): 1.25 SOLUTION, CONCENTRATE RESPIRATORY (INHALATION) at 03:23

## 2023-11-21 RX ADMIN — CHLORHEXIDINE GLUCONATE 1 APPLICATION(S): 213 SOLUTION TOPICAL at 05:41

## 2023-11-21 RX ADMIN — Medication 1 MILLIGRAM(S): at 14:01

## 2023-11-21 RX ADMIN — Medication 4 MILLILITER(S): at 21:49

## 2023-11-21 RX ADMIN — Medication 4 MILLILITER(S): at 08:51

## 2023-11-21 RX ADMIN — MEROPENEM 1000 MILLIGRAM(S): 1 INJECTION INTRAVENOUS at 20:49

## 2023-11-21 RX ADMIN — Medication 1 TABLET(S): at 14:02

## 2023-11-21 RX ADMIN — LEVALBUTEROL 1.25 MILLIGRAM(S): 1.25 SOLUTION, CONCENTRATE RESPIRATORY (INHALATION) at 21:48

## 2023-11-21 NOTE — PROGRESS NOTE ADULT - SUBJECTIVE AND OBJECTIVE BOX
NSICU ATTENDING PROGRESS NOTE    Historical Review:  82y M with PMH HTN, CAD s/p stents, renal cell carcinoma status post left nephrectomy, bladder CA, BPH, CHF, LBBB, vertigo,  HLD, 5.5cm AAA without rupture post EVAR, paroxysmal A-fib on Eliquis, Plavix, and aspirin, early stage Alzheimer dementia transferred from [facility] s/p unwitnessed fall with head strike at home found by wife on floor at 8am. Patient brought to urgent care by wife and had 5 staples to posterior scalp but was instructed to go to nearest ED.  CT head at [facility] showed R frontal IPH, SDH, SAH at 9:00. CTA stroke protocol not performed at [facility]. Patient BIB on 6L NC.  Pt GCS 15 on arrival, A&Ox2 on arrival. After initial assessment, patient noted to be vomiting enroute to CT scanner.     24hr EVENTS:  11/10 - OR for decompression.  11/11 - Tolerating CPAP  11/12 - CTH with slight worse edema and subgaleal blood collection on personal read. worse exam as below  11/13 - Afebrile with improving WBC. Exam improved in AM. Extubated. sp 1U Plts    O/n events:  11/19 - continues on HFNC  11/20 - tolerated transition to NC, had episode of sustained tachycardia initially appeared 2nd degree AV block -> then aflutter, cards consulted, broke with metoprolol 5mg  11/21 - conversant today, weaned to 2L NC    ROS: no HA, no CP/SOB, no n/v/d, no palpitations, no fever/chills  -----------------------------------------------------------------------------------------------------------------------------------------------------------------------------------    PHYSICAL EXAM:  General: lying comfortably in bed  Neuro:  -Mental status- No acute distress  -CN- PERRL 3mm, EOMI, tongue midline, L facial  - R full strength following commands  - LUE wdrl, LLE wdrl    CV: tachycardia improved  Pulm: no inc WOB, secretions improving, less congested  Abd: Soft, nontender, nondistended; [facility] 11/21  Skin: warm, dry    -----------------------------------------------------------------------------------------------------  ICU Vital Signs Last 24 Hrs  T(C): 36.8 (21 Nov 2023 08:00), Max: 38.3 (20 Nov 2023 22:00)  T(F): 98.2 (21 Nov 2023 08:00), Max: 100.9 (20 Nov 2023 22:00)  HR: 80 (21 Nov 2023 08:54) (68 - 107)  BP: 160/71 (21 Nov 2023 08:00) (100/62 - 160/71)  BP(mean): 97 (21 Nov 2023 08:00) (70 - 118)  RR: 17 (21 Nov 2023 08:00) (13 - 20)  SpO2: 100% (21 Nov 2023 08:54) (97% - 100%)    O2 Parameters below as of 21 Nov 2023 08:54  Patient On (Oxygen Delivery Method): nasal cannula w/ humidification, 2l      I&O's Summary    20 Nov 2023 07:01  -  21 Nov 2023 07:00  --------------------------------------------------------  IN: 1180 mL / OUT: 575 mL / NET: 605 mL (incontinence episodes)        MEDICATIONS  (STANDING):  acetylcysteine 20%  Inhalation 4 milliLiter(s) Inhalation every 6 hours  amantadine 100 milliGRAM(s) Oral <User Schedule>  aspirin  chewable 81 milliGRAM(s) Oral daily  atorvastatin 40 milliGRAM(s) Oral at bedtime  chlorhexidine 2% Cloths 1 Application(s) Topical <User Schedule>  diltiazem  Tablet 60 milliGRAM(s) Oral <User Schedule>  folic acid 1 milliGRAM(s) Oral daily  fondaparinux Injectable 1.2 milliGRAM(s) SubCutaneous <User Schedule>  levalbuterol Inhalation 1.25 milliGRAM(s) Inhalation every 6 hours  melatonin 3 milliGRAM(s) Oral at bedtime  metoprolol tartrate 25 milliGRAM(s) Oral <User Schedule>  Nephro-roma 1 Tablet(s) Oral daily  polyethylene glycol 3350 17 Gram(s) Oral daily  senna 2 Tablet(s) Oral at bedtime      RESPIRATORY: 2L NC    IMAGING:   No new imaging    LAB RESULTS:             9.1    27.82 )-----------( 368      ( 21 Nov 2023 01:20 )             27.0     11-21    143  |  107  |  42.4<H>  ----------------------------<  138<H>  4.3   |  21.0<L>  |  1.43<H>    Ca    9.4      21 Nov 2023 01:20  Phos  3.7     11-21  Mg     2.2     11-21      ABG - ( 20 Nov 2023 00:00 )  pH, Arterial: 7.510 pH, Blood: x     /  pCO2: 31    /  pO2: 115   / HCO3: 25    / Base Excess: 1.7   /  SaO2: 100.0   -----------------------------------------------------------------------------------------------------------------------------------------------------------------------------------

## 2023-11-21 NOTE — PROGRESS NOTE ADULT - ASSESSMENT
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 Jose Anderson in [place] last seen 4/2023, presents with unwitnessed fall with traumatic SDH/SAH POD#2 from R-decompressive hemicraniectomy remains intubated, noted febrile episodes yesterday with leukocytosis, blood cultures sent, TTE done with reports AV lesion

## 2023-11-21 NOTE — PROGRESS NOTE ADULT - ASSESSMENT
82y M with TBI - R frontal IPH, SDH, tSAH; s/p R decompressive hemicraniectomy 11/10.  Encephalopathy due to TBI, resp. infection, resp. distress, delirium.  Respiratory distress due to impaired bulbar function with difficulty managing secretions, aspiration PNA.  PMH of CAD s/p stents, CHF; HTN, HLD. HIT, AAA post EVAR. Parox. A.Fib, on Cardizem.   PMH of renal cell carcinoma status post left nephrectomy, bladder CA, BPH  Nonoliguric DONNIE, likely ROXANNE, critical illness; improving.  AV vegetation on TTE - unclear etiology. Several BCx negative.  Hypernatremia.     Plan -  Neuro: EEG with epileptiform discharges 11/18  neurochecks q2h, protected sleep time  switch abx to avoid any component of cefepime encephalopathy  amantadine 100mg daily, renally dosed  MRI w/wo for infx work-up  melatonin at night  Mobility: PT/OT    Resp:   monitor resp status, wean off HFNC as tolerated  cont nebs + Mucomyst  aggressive chest PT, NT suctioning   CXR morning    CV:  cardiology consulted  maintain -150, HR<120  metoprolol 25mg BID, cardizem 60 BID  cont ASA 81 daily for CAD    GI:  hold TF for now, re-eval in am to restart; ulcer ppx, BM regimen    Renal:   monitor e-lytes and UOP  FWF to 250 q6hrs    ID: Klebs tracheobronchitis, 11/21 new leukocytosis >20  ID consulted for leukocytosis, fever  If febrile again, reculture  Trend fever curve and WBC    Heme:  SCDs, chemoppx with Fondaparinux, renal dose     Dispo: keep in NSICU today

## 2023-11-21 NOTE — PROGRESS NOTE ADULT - SUBJECTIVE AND OBJECTIVE BOX
[facility] PHYSICIAN [facility]NERS                                                         CARDIOLOGY AT [facility]                                                                  [address] [address]                                                         Telephone: [phone]. Fax:[phone]                                                                             PROGRESS NOTE    Reason for follow up: Atrial fibrillation  Update: Patient currently maintaining SR at this time. Some atrial fibrillation overnight, but no further concern for Mobitz I vs PACs.       Review of symptoms: Unable to obtain       Vitals:  T(C): 36.8 (11-21-23 @ 08:00), Max: 38.3 (11-20-23 @ 22:00)  HR: 80 (11-21-23 @ 08:54) (68 - 132)  BP: 160/71 (11-21-23 @ 08:00) (100/62 - 160/71)  RR: 17 (11-21-23 @ 08:00) (13 - 21)  SpO2: 100% (11-21-23 @ 08:54) (95% - 100%)  Wt(kg): --  I&O's Summary    20 Nov 2023 07:01  -  21 Nov 2023 07:00  --------------------------------------------------------  IN: 1180 mL / OUT: 575 mL / NET: 605 mL      Weight (kg): 72.8 (11-19 @ 19:00)    PHYSICAL EXAM:  Appearance: Comfortable. No acute distress  HEENT:  Atraumatic. Normocephalic.  Normal oral mucosa  Neurologic: A & O x 2, left sided weakness  Cardiovascular: RRR S1 S2, no rubs/gallops. No JVD, II/VI systolic murmur lsb  Respiratory: Lungs clear to auscultation, unlabored   Gastrointestinal:  Soft, Non-tender, + BS  Lower Extremities: 2+ Peripheral Pulses, No clubbing, cyanosis, or edema  Psychiatry: Patient is calm. No agitation.   Skin: warm and dry.    CURRENT CARDIAC MEDICATIONS:  diltiazem    Tablet 60 milliGRAM(s) Oral <User Schedule>  hydrALAZINE Injectable 10 milliGRAM(s) IV Push every 2 hours PRN  labetalol Injectable 10 milliGRAM(s) IV Push every 2 hours PRN  metoprolol tartrate 25 milliGRAM(s) Oral <User Schedule>      CURRENT OTHER MEDICATIONS:  acetylcysteine 20%  Inhalation 4 milliLiter(s) Inhalation every 6 hours  levalbuterol Inhalation 1.25 milliGRAM(s) Inhalation every 6 hours  acetaminophen   Oral Liquid .. 800 milliGRAM(s) Oral every 6 hours PRN Temp greater or equal to 38C (100.4F)  amantadine 100 milliGRAM(s) Oral <User Schedule>  melatonin 3 milliGRAM(s) Oral at bedtime  polyethylene glycol 3350 17 Gram(s) Oral daily  senna 2 Tablet(s) Oral at bedtime  atorvastatin 40 milliGRAM(s) Oral at bedtime  aspirin  chewable 81 milliGRAM(s) Oral daily  chlorhexidine 2% Cloths 1 Application(s) Topical <User Schedule>  folic acid 1 milliGRAM(s) Oral daily  fondaparinux Injectable 1.2 milliGRAM(s) SubCutaneous <User Schedule>  Nephro-roma 1 Tablet(s) Oral daily      LABS:	 	  ( 20 Nov 2023 14:00 )  Troponin T  X    ,  CPK  107  , CKMB  X    , BNP X        , ( 14 Nov 2023 14:00 )  Troponin T  X    ,  CPK  169  , CKMB  X    , BNP X        , ( 13 Nov 2023 01:24 )  Troponin T  X    ,  CPK  222<H>, CKMB  X    , BNP X                                  9.1    27.82 )-----------( 368      ( 21 Nov 2023 01:20 )             27.0     11-21    143  |  107  |  42.4<H>  ----------------------------<  138<H>  4.3   |  21.0<L>  |  1.43<H>    Ca    9.4      21 Nov 2023 01:20  Phos  3.7     11-21  Mg     2.2     11-21      PT/INR/PTT ( 10 Nov 2023 11:00 )                       :                       :      11.5         :       30.8                  .        .                   .              .           .       1.04        .                                       Lipid Profile: Date: 11-14 @ 14:00  Total cholesterol 125; Direct LDL: --; HDL: 31; Triglycerides:181    HgA1c:   TSH:     TELEMETRY: Afib with RVR, SR  ECG:    DIAGNOSTIC TESTING:  [ ] Echocardiogram:   < from: TTE Echo Limited or F/U (11.20.23 @ 20:01) >  PHYSICIAN INTERPRETATION:  Left Ventricle: The left ventricle was not well visualized.  Right Ventricle: The right ventricle was not well visualized.  Aortic Valve: This is a nondiagnostic study to evaluate aortic valve   because of poor windows, patient was agitated.  As recommended earlier, Consider WHIT to evaluate Aortic Vavle.      Summary:   1. This is a nondiagnostic study to evaluate aortic valve because of   poor windows, patient was agitated.      As recommended earlier, Consider WHIT to evaluate Aortic Vavle.    MD Keri Electronically signed on 11/21/2023 at 9:12:23 AM        *** Final ***    < end of copied text >    [ ]  Catheterization:  [ ] Stress Test:    OTHER: 	                                                                [facility]YS[facility]                                                         CARDIOLOGY AT [facility]                                                                  [address] [id number] [place] [address]                                                         Telephone: [phone]. Fax:[phone]                                                                             PROGRESS NOTE    Reason for follow up: Atrial fibrillation  Update: Patient currently maintaining SR at this time. Some atrial fibrillation overnight, but no further concern for Mobitz I vs PACs.         Vitals:  T(C): 36.8 (11-21-23 @ 08:00), Max: 38.3 (11-20-23 @ 22:00)  HR: 80 (11-21-23 @ 08:54) (68 - 132)  BP: 160/71 (11-21-23 @ 08:00) (100/62 - 160/71)  RR: 17 (11-21-23 @ 08:00) (13 - 21)  SpO2: 100% (11-21-23 @ 08:54) (95% - 100%)  Wt(kg): --  I&O's Summary    20 Nov 2023 07:01  -  21 Nov 2023 07:00  --------------------------------------------------------  IN: 1180 mL / OUT: 575 mL / NET: 605 mL      Weight (kg): 72.8 (11-19 @ 19:00)    PHYSICAL EXAM:  Appearance: Comfortable. No acute distress  HEENT:  Atraumatic. Normocephalic.  Normal oral mucosa  Neurologic: A & O x 2, left sided weakness  Cardiovascular: RRR S1 S2, no rubs/gallops. No JVD, II/VI systolic murmur lsb  Respiratory: Lungs clear to auscultation, unlabored   Gastrointestinal:  Soft, Non-tender, + BS  Lower Extremities: 2+ Peripheral Pulses, No clubbing, cyanosis, or edema  Psychiatry: Patient is calm. No agitation.   Skin: warm and dry.    CURRENT CARDIAC MEDICATIONS:  diltiazem    Tablet 60 milliGRAM(s) Oral <User Schedule>  hydrALAZINE Injectable 10 milliGRAM(s) IV Push every 2 hours PRN  labetalol Injectable 10 milliGRAM(s) IV Push every 2 hours PRN  metoprolol tartrate 25 milliGRAM(s) Oral <User Schedule>      CURRENT OTHER MEDICATIONS:  acetylcysteine 20%  Inhalation 4 milliLiter(s) Inhalation every 6 hours  levalbuterol Inhalation 1.25 milliGRAM(s) Inhalation every 6 hours  acetaminophen   Oral Liquid .. 800 milliGRAM(s) Oral every 6 hours PRN Temp greater or equal to 38C (100.4F)  amantadine 100 milliGRAM(s) Oral <User Schedule>  melatonin 3 milliGRAM(s) Oral at bedtime  polyethylene glycol 3350 17 Gram(s) Oral daily  senna 2 Tablet(s) Oral at bedtime  atorvastatin 40 milliGRAM(s) Oral at bedtime  aspirin  chewable 81 milliGRAM(s) Oral daily  chlorhexidine 2% Cloths 1 Application(s) Topical <User Schedule>  folic acid 1 milliGRAM(s) Oral daily  fondaparinux Injectable 1.2 milliGRAM(s) SubCutaneous <User Schedule>  Nephro-roma 1 Tablet(s) Oral daily      LABS:	 	  ( 20 Nov 2023 14:00 )  Troponin T  X    ,  CPK  107  , CKMB  X    , BNP X        , ( 14 Nov 2023 14:00 )  Troponin T  X    ,  CPK  169  , CKMB  X    , BNP X        , ( 13 Nov 2023 01:24 )  Troponin T  X    ,  CPK  222<H>, CKMB  X    , BNP X                                  9.1    27.82 )-----------( 368      ( 21 Nov 2023 01:20 )             27.0     11-21    143  |  107  |  42.4<H>  ----------------------------<  138<H>  4.3   |  21.0<L>  |  1.43<H>    Ca    9.4      21 Nov 2023 01:20  Phos  3.7     11-21  Mg     2.2     11-21      PT/INR/PTT ( 10 Nov 2023 11:00 )                       :                       :      11.5         :       30.8                  .        .                   .              .           .       1.04        .                                       Lipid Profile: Date: 11-14 @ 14:00  Total cholesterol 125; Direct LDL: --; HDL: 31; Triglycerides:181    HgA1c:   TSH:     TELEMETRY: Afib with RVR, SR  ECG:    DIAGNOSTIC TESTING:  [ ] Echocardiogram:   < from: TTE Echo Limited or F/U (11.20.23 @ 20:01) >  PHYSICIAN INTERPRETATION:  Left Ventricle: The left ventricle was not well visualized.  Right Ventricle: The right ventricle was not well visualized.  Aortic Valve: This is a nondiagnostic study to evaluate aortic valve   because of poor windows, patient was agitated.  As recommended earlier, Consider WHIT to evaluate Aortic Vavle.      Summary:   1. This is a nondiagnostic study to evaluate aortic valve because of   poor windows, patient was agitated.      As recommended earlier, Consider WHIT to evaluate Aortic Vavle.    MD Keri Electronically signed on 11/21/2023 at 9:12:23 AM        *** Final ***    < end of copied text >    [ ]  Catheterization:  [ ] Stress Test:    OTHER:

## 2023-11-21 NOTE — PROGRESS NOTE ADULT - SUBJECTIVE AND OBJECTIVE BOX
HPI:  82y M with PMH HTN, CAD s/p stents, renal cell carcinoma status post left nephrectomy, bladder CA, BPH, CHF, LBBB, vertigo,  HLD, 5.5cm AAA without rupture post EVAR, paroxysmal A-fib on Eliquis, Plavix, and aspirin, early stage Alzheimer dementia transferred from [facility] s/p unwitnessed fall with head strike at home found by wife on floor at 8am. Patient brought to urgent care by wife and had 5 staples to posterior scalp but was instructed to go to nearest ED.  CT head at [place] showed R frontal IPH, SDH, SAH at 9:00. CTA stroke protocol not performed at [facility]. Patient BIB on 6L NC.  Pt GCS 15 on arrival, A&Ox2 on arrival. After initial assessment, patient noted to be vomiting enroute to CT scanner.          (10 Nov 2023 11:04)      INTERVAL HPI/OVERNIGHT EVENTS:  83y Male s/p R hemicraniectomy POD#11 seen lying comfortably in bed. Intermittently lethargic. Neuro exam remains stable.     Vital Signs Last 24 Hrs  T(C): 38 (21 Nov 2023 01:00), Max: 38.3 (20 Nov 2023 22:00)  T(F): 100.4 (21 Nov 2023 01:00), Max: 100.9 (20 Nov 2023 22:00)  HR: 78 (21 Nov 2023 01:00) (74 - 155)  BP: 104/57 (21 Nov 2023 01:00) (104/57 - 158/90)  BP(mean): 71 (21 Nov 2023 01:00) (63 - 118)  RR: 20 (21 Nov 2023 01:00) (14 - 21)  SpO2: 100% (21 Nov 2023 01:00) (92% - 100%)    Parameters below as of 21 Nov 2023 01:00  Patient On (Oxygen Delivery Method): nasal cannula w/ humidification  O2 Flow (L/min): 3      PHYSICAL EXAM:  General: NAD  Head/Incision: c/d/i. Flap soft but full  Neuro:  -Mental status- No acute distress. AOx2 (person/place with options, gives thumbs up), hypophonic but verbalizes  -CN- PERRL 3mm, EOMI, tongue midline, L facial  - R full strength following commands  - LUE 0/5, LLE trace wdrl    CV: Irregular RR  Pulm: no use accessory muscles. breathing unlabored and even.   Abd: Soft, nontender, nondistended  Ext: no noted edema in lower ext  Skin: warm, dry    LABS:                        8.9    16.44 )-----------( 344      ( 20 Nov 2023 01:10 )             26.2     11-20    143  |  106  |  41.0<H>  ----------------------------<  134<H>  3.9   |  24.0  |  1.51<H>    Ca    9.0      20 Nov 2023 07:08  Phos  3.2     11-20  Mg     2.3     11-20        Urinalysis Basic - ( 20 Nov 2023 07:08 )    Color: x / Appearance: x / SG: x / pH: x  Gluc: 134 mg/dL / Ketone: x  / Bili: x / Urobili: x   Blood: x / Protein: x / Nitrite: x   Leuk Esterase: x / RBC: x / WBC x   Sq Epi: x / Non Sq Epi: x / Bacteria: x        11-19 @ 07:01 - 11-20 @ 07:00  --------------------------------------------------------  IN: 2110 mL / OUT: 1150 mL / NET: 960 mL    11-20 @ 07:01 - 11-21 @ 01:29  --------------------------------------------------------  IN: 810 mL / OUT: 375 mL / NET: 435 mL        RADIOLOGY & ADDITIONAL TESTS:  Reviewed

## 2023-11-21 NOTE — PROGRESS NOTE ADULT - NS ATTEND AMEND GEN_ALL_CORE FT
Neurosurgery Attending Attestation:    Patient seen and examined at bedside. Agree with plan and note as documented above.    82 y/o M s/p R craniectomy for ICH evacuation 11/10/2023, now s/p extubation, oriented to self, not hospital or year, FC in RUE and RLE. Trace movement in LUE and LLE. Care per NCCU.    -Marty Larios MD.

## 2023-11-21 NOTE — PROGRESS NOTE ADULT - PROBLEM SELECTOR PLAN 2
- Repeat limited echocardiogram nondiagnostic for evaluating the aortic valve.   - All blood cultures are NGTD at this time.   - No indication for WHIT at this time as no evidence of infection.   - Continue to monitor.

## 2023-11-21 NOTE — CONSULT NOTE ADULT - ASSESSMENT
82y M with PMH HTN, CAD s/p stents, renal cell carcinoma status post left nephrectomy, bladder CA, BPH, CHF, LBBB, vertigo,  HLD, 5.5cm AAA without rupture post EVAR, paroxysmal A-fib on Eliquis, Plavix, and aspirin, early stage Alzheimer dementia transferred from [facility] s/p unwitnessed fall with head strike at home found by wife on floor at 8am. Patient brought to urgent care by wife and had 5 staples to posterior scalp but was instructed to go to nearest ED.  CT head at [place] showed R frontal IPH, SDH, SAH s/p decompressive craniectomy  ID called for worsening leukocytosis  Patient has been having intermittent fever during this admission, started on cefepime for klebsiella aerogenes tracheobronchitis, de-escalated to ceftriaxone due to concern for mental status change on cefepime. Today noted with uptrending WBC.    Leukocytosis  Fever  Tracheobronchitis, concern for ongoing aspiration  Penicillin allergy-unknown  SDH        - intermittent fever  - leukocytosis uptrending even prior to change cefepime-->ceftriaxone  - UA from 11/17 is neg  - repeat BCX  - repeat sputum CX  - f/u Mri head-d/w team clinically low suspicion for CNS infection or surgical site infection  - check Ct C/Ap  - check USG venous dopplers b/l UE  - meropenem 1g iv q12h  - initial TTE with ?  AV vegetation repeat TTE 11/21 nondiagnostic  - Trend Fever  - Trend Leukocytosis    d/w intensivist Dr Baltazar, pharmacy  Will Follow

## 2023-11-21 NOTE — CHART NOTE - NSCHARTNOTEFT_GEN_A_CORE
Source: Patient [ ]  Family [ ]   other [x ] EMR and staff     Current Diet: Diet, NPO with Tube Feed:   Tube Feeding Modality: Nasogastric  Nepro (NEPRORTH)  Total Volume for 24 Hours (mL): 200  Continuous  Starting Tube Feed Rate {mL per Hour}: 10  Until Goal Tube Feed Rate (mL per Hour): 10  Tube Feed Duration (in Hours): 20  Tube Feed Start Time: 11:45 (11-19-23 @ 11:44)    Current Weight:   11/20: 62.3 kg   11/16: 71.2 kg   11/14: 75.6 kg   11/11: 72.8 kg   (Generalized edema)       % Weight Change: Unsure of accuracy of weights; however trending down noted; will continue to monitor weights for trends,     Pertinent Medications: MEDICATIONS  (STANDING):  acetylcysteine 20%  Inhalation 4 milliLiter(s) Inhalation every 6 hours  amantadine 100 milliGRAM(s) Oral <User Schedule>  aspirin  chewable 81 milliGRAM(s) Oral daily  atorvastatin 40 milliGRAM(s) Oral at bedtime  chlorhexidine 2% Cloths 1 Application(s) Topical <User Schedule>  diltiazem    Tablet 60 milliGRAM(s) Oral <User Schedule>  folic acid 1 milliGRAM(s) Oral daily  fondaparinux Injectable 1.2 milliGRAM(s) SubCutaneous <User Schedule>  levalbuterol Inhalation 1.25 milliGRAM(s) Inhalation every 6 hours  melatonin 3 milliGRAM(s) Oral at bedtime  metoprolol tartrate 25 milliGRAM(s) Oral <User Schedule>  Nephro-roma 1 Tablet(s) Oral daily  polyethylene glycol 3350 17 Gram(s) Oral daily  senna 2 Tablet(s) Oral at bedtime    MEDICATIONS  (PRN):  acetaminophen   Oral Liquid .. 800 milliGRAM(s) Oral every 6 hours PRN Temp greater or equal to 38C (100.4F)  hydrALAZINE Injectable 10 milliGRAM(s) IV Push every 2 hours PRN SBP>160  labetalol Injectable 10 milliGRAM(s) IV Push every 2 hours PRN SBP>160    Pertinent Labs: CBC Full  -  ( 21 Nov 2023 01:20 )  WBC Count : 27.82 K/uL  RBC Count : 2.86 M/uL  Hemoglobin : 9.1 g/dL  Hematocrit : 27.0 %  Platelet Count - Automated : 368 K/uL  Mean Cell Volume : 94.4 fl  Mean Cell Hemoglobin : 31.8 pg  Mean Cell Hemoglobin Concentration : 33.7 gm/dL  Auto Neutrophil # : x  Auto Lymphocyte # : x  Auto Monocyte # : x  Auto Eosinophil # : x  Auto Basophil # : x  Auto Neutrophil % : x  Auto Lymphocyte % : x  Auto Monocyte % : x  Auto Eosinophil % : x  Auto Basophil % : x      Skin: R craniotomy site     Nutrition focused physical exam previously conducted - found signs of malnutrition [ ]absent [x ]present    Subcutaneous fat loss: Mod [x ] Orbital fat pads region, [x ]Buccal fat region, [x ]Triceps region,  [x ]Ribs region    Muscle wasting: Mod [ x]Temples region, [ x]Clavicle region, [x ]Shoulder region, [ ]Scapula region, [ ]Interosseous region,  [ ]thigh region, [ ]Calf region    Estimated Needs:   [x ] no change since previous assessment  [ ] recalculated:     Hospital course:   Pt is a 82y M with PMH HTN, CAD s/p stents, renal cell carcinoma status post left nephrectomy, bladder CA, BPH, CHF, LBBB, vertigo,  HLD, 5.5cm AAA without rupture post EVAR, paroxysmal A-fib on Eliquis, Plavix, and aspirin, early stage Alzheimer dementia transferred from [facility] [place] s/p unwitnessed fall with head strike at home found by wife on floor at 8am. Patient brought to urgent care by wife and had 5 staples to posterior scalp but was instructed to go to nearest ED.  CT head at [place] showed R frontal IPH, SDH, SAH at 9:00. CTA stroke protocol not performed at [facility] [place]. Patient BIB on 6L NC.  Pt GCS 15 on arrival, A&Ox2 on arrival. After initial assessment, patient noted to be vomiting enroute to CT scanner.   11/10 - OR for decompression.  11/11 - Tolerating CPAP  11/12 - CTH with slight worse edema and subgaleal blood collection on personal read. worse exam as below  11/13 - Afebrile with improving WBC. Exam improved in AM. Extubated. sp 1U Plts  11/19 - continues on HFNC  11/20 - tolerated transition to NC, had episode of sustained tachycardia initially appeared 2nd degree AV block     Current Nutrition Diagnosis:  Pt remains at high nutrition risk secondary to severe (acute) protein calorie malnutrition related to inability to meet sufficient protein energy needs in setting of TBI, - R frontal IPH, SDH, tSAH; s/p R decompressive hemicraniectomy 11/10 as evidenced by physical signs of mod muscle/fat loss, + edema and meeting < 50% estimated energy needs > 5days. Pt with reduced cognition noted; per SLP; pt to remain NPO. Trickle feeds resumed.     Recommendations:   1. RX: Nephro-roma and folic acid daily   2. Check weight daily to monitor trends   3. As/when medically feasible; re-start enteral feeds of Nepro @ 10ml/hr; increase by 10ml every 6 hours as tolerated to goal rate = 50ml/hr (x20 hours) 1000ml/day; 1800kcal, 81gm protein   4. Monitor Na+, Renal labs       Monitoring and Evaluation:   [ ] PO intake [x ] Tolerance to diet prescription [X] Weights  [X] Follow up per protocol [X] Labs:

## 2023-11-21 NOTE — PROGRESS NOTE ADULT - ASSESSMENT
81 yo male POD#11 s/p a right decompressive hemicraniectomy by Dr. Larios    -Neurochecks q2  -Pain Control, avoid over sedation  -Goal  to 160  -Flap remains full but soft, timing of cranioplasty TBD  -Monitor for Airway protection  -Keep HOB >30 degrees  -EEG negative. AED ppx d/c'd 7 days post op  -Free water flushes 250 mL q6h for hypernatremia, BMP q8h  -DVT ppx with SCD and fondaparinux (h/o HIT)  -Wean oxygen requirements as tolerated, chest PT. Currently on 4L NC  -Medical management per NSICU team   -Plan of care to be discussed in AM with neurosurgery attending

## 2023-11-21 NOTE — CONSULT NOTE ADULT - SUBJECTIVE AND OBJECTIVE BOX
[facility]                                                INFECTIOUS DISEASES  =======================================================                     Uriel Vivas#   Roman Gonzáles MD#   Shan Wilson MD*                           Jennie Huerta MD*   Stephany Segura MD*            Diplomates American Board of Internal Medicine & Infectious Diseases                  # [place] Office - Appt - Tel  [phone] Fax [phone]                * [place] Office - Appt - Tel [phone] Fax [phone]                                  Hospital Consult line:  [phone]  =======================================================      [id number]  DEUCE BALL   HPI:  82y M with PMH HTN, CAD s/p stents, renal cell carcinoma status post left nephrectomy, bladder CA, BPH, CHF, LBBB, vertigo,  HLD, 5.5cm AAA without rupture post EVAR, paroxysmal A-fib on Eliquis, Plavix, and aspirin, early stage Alzheimer dementia transferred from [facility] s/p unwitnessed fall with head strike at home found by wife on floor at 8am. Patient brought to urgent care by wife and had 5 staples to posterior scalp but was instructed to go to nearest ED.  CT head at [place] showed R frontal IPH, SDH, SAH at 9:00. CTA stroke protocol not performed at [facility]. Patient BIB on 6L NC.  Pt GCS 15 on arrival, A&Ox2 on arrival. After initial assessment, patient noted to be vomiting enroute to CT scanner.          (10 Nov 2023 11:04)          I have personally reviewed the labs and data; pertinent labs and data are listed in this note; please see below.   =======================================================  Past Medical & Surgical Hx:  =====================  PAST MEDICAL & SURGICAL HISTORY:  HTN  dx       Bladder Cancer (ICD9 188.9)  TCC, dx       Hyperlipemia (ICD9 272.4)  dx       Lt Renal Neoplasm  left - s/p left nephrectomy      Hx antineoplastic chemotherapy (BCG )      Left bundle branch block      Vertigo      Heel spur, left      Abdominal aortic aneurysm (AAA)  5.5 cm      BPH (benign prostatic hyperplasia)      Renal mass, right  current - following with Dr Santiago      CAD (coronary artery disease)      Bladder cancer, primary, with metastasis from bladder to other site  Left kidney      Acute renal failure, unspecified acute renal failure type      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)      Abdominal aortic aneurysm (AAA) without rupture      Congestive heart failure, unspecified chronicity, unspecified heart failure type      Hypertension      History of abdominal aortic aneurysm (AAA)      CAD (coronary artery disease)      Alzheimers disease      urethral Stent 2008  stent placement and removal      S/P Cystoscopy  bladder polyps removal multiple times (since ), 6/10 , 10/2011 & 10/17/2011, , 2012, last 13, 2013, 2014      S/P Tonsillectomy      History of Lt  Nephrectomy  6/10 - left      History of cystoscopy  2015      H/O abdominal aortic aneurysm repair      S/P AAA repair      History of nephrectomy  Left      Presence of stent in LAD coronary artery      History of heart artery stent  DIONICIO        Problem List:  ==========  HEALTH ISSUES - PROBLEM Dx:  Paroxysmal atrial fibrillation    DONNIE (acute kidney injury)    Abnormal echocardiogram    Endocarditis          Social Hx:  =======  no toxic habits currently    FAMILY HISTORY:  Family history of MI (myocardial infarction) (Father, Mother)  father  76y/o MI, mother  94y/o alzheimers    Family history of early CAD (Father, Mother)    no significant family history of immunosuppressive disorders in mother or father   =======================================================    REVIEW OF SYSTEMS:  CONSTITUTIONAL:  No Fever or chills  HEENT:  No diplopia or blurred vision.  No earache, sore throat or runny nose.  CARDIOVASCULAR:  No pressure, squeezing, strangling, tightness, heaviness or aching about the chest, neck, axilla or epigastrium.  RESPIRATORY:  No cough, shortness of breath  GASTROINTESTINAL:  No nausea, vomiting or diarrhea.  GENITOURINARY:  No dysuria, frequency or urgency. No Blood in urine  MUSCULOSKELETAL:  no joint aches, no muscle pain  SKIN:  No change in skin, hair or nails.  NEUROLOGIC:  No Headaches, seizures or weakness.  PSYCHIATRIC:  No disorder of thought or mood.  ENDOCRINE:  No heat or cold intolerance  HEMATOLOGICAL:  No easy bruising or bleeding.    =======================================================  Allergies    penicillin (Rash)  heparin (Other (Severe))  penicillin (Hives)  Cipro (Other)  Levaquin (Other)  heparin (Other)    Intolerances    Antibiotics:    Other medications:  acetylcysteine 20%  Inhalation 4 milliLiter(s) Inhalation every 6 hours  amantadine 100 milliGRAM(s) Oral <User Schedule>  aspirin  chewable 81 milliGRAM(s) Oral daily  atorvastatin 40 milliGRAM(s) Oral at bedtime  chlorhexidine 2% Cloths 1 Application(s) Topical <User Schedule>  diltiazem    Tablet 60 milliGRAM(s) Oral <User Schedule>  folic acid 1 milliGRAM(s) Oral daily  fondaparinux Injectable 1.2 milliGRAM(s) SubCutaneous <User Schedule>  levalbuterol Inhalation 1.25 milliGRAM(s) Inhalation every 6 hours  melatonin 3 milliGRAM(s) Oral at bedtime  metoprolol tartrate 25 milliGRAM(s) Oral <User Schedule>  Nephro-roma 1 Tablet(s) Oral daily  polyethylene glycol 3350 17 Gram(s) Oral daily  senna 2 Tablet(s) Oral at bedtime     cefepime  Injectable.   2000 milliGRAM(s) IV Push (23 @ 18:16)    cefepime  Injectable.   2000 milliGRAM(s) IV Push (11-15-23 @ 05:29)   2000 milliGRAM(s) IV Push (11-15-23 @ 17:39)   2000 milliGRAM(s) IV Push (23 @ 05:30)   2000 milliGRAM(s) IV Push (23 @ 17:10)   2000 milliGRAM(s) IV Push (23 @ 05:30)   2000 milliGRAM(s) IV Push (23 @ 17:08)   2000 milliGRAM(s) IV Push (23 @ 05:09)   2000 milliGRAM(s) IV Push (23 @ 18:04)   2000 milliGRAM(s) IV Push (23 @ 06:07)    cefTRIAXone Injectable.   1000 milliGRAM(s) IV Push (23 @ 14:03)   1000 milliGRAM(s) IV Push (23 @ 18:41)      ======================================================  Physical Exam:  ============  T(F): 99 (2023 12:00), Max: 100.9 (2023 22:00)  HR: 78 (2023 15:27)  BP: 160/98 (2023 13:00)  RR: 19 (2023 13:00)  SpO2: 100% (2023 13:00) (97% - 100%)  temp max in last 48H T(F): , Max: 100.9 (23 @ 22:00)    General:  No acute distress.  Eye: Pupils are equal, round and reactive to light, Normal conjunctiva.  HENT: Normocephalic, Oral mucosa is moist, No pharyngeal erythema, No sinus tenderness.  Neck: Supple, No lymphadenopathy.  Respiratory: Lungs are clear to auscultation, Respirations are non-labored.  Cardiovascular: Normal rate, Regular rhythm, s1 + s2  Gastrointestinal: Soft, Non-tender, Non-distended, Normal bowel sounds.  Genitourinary: No costovertebral angle tenderness.  Lymphatics: No lymphadenopathy neck,   Musculoskeletal: Normal range of motion, Normal strength.  Integumentary: No rash.  Neurologic: Alert, Oriented, No focal deficits  Psychiatric: Appropriate mood & affect.    =======================================================  Labs:                        9.1    27.82 )-----------( 368      ( 2023 01:20 )             27.0         143  |  107  |  42.4<H>  ----------------------------<  138<H>  4.3   |  21.0<L>  |  1.43<H>    Ca    9.4      2023 01:20  Phos  3.7       Mg     2.2             Culture - Blood (collected 23 @ 11:28)  Source: .Blood Blood-Peripheral    Culture - Blood (collected 23 @ 11:26)  Source: .Blood Blood-Peripheral    Culture - Blood (collected 23 @ 07:40)  Source: .Blood Blood-Peripheral  Final Report (23 @ 14:01):    No growth at 5 days    Culture - Blood (collected 23 @ 17:55)  Source: .Blood Blood-Peripheral  Final Report (23 @ 02:00):    No growth at 5 days    Culture - Blood (collected 23 @ 16:19)  Source: .Blood Blood  Final Report (23 @ 23:00):    No growth at 5 days    Culture - Sputum (collected 23 @ 16:11)  Source: ET Tube ET Tube  Gram Stain (23 @ 11:50):    Numerous polymorphonuclear leukocytes per low power field    Few Squamous epithelial cells per low power field    Few Yeast like cells per oil power field    Numerous Gram Negative Rods per oil power field  Final Report (23 @ 08:57):    Numerous Klebsiella aerogenes (Previously Enterobacter)    Normal Respiratory Isabell present  Organism: Klebsiella aerogenes (Previously Enterobacter) (23 @ 08:57)  Organism: Klebsiella aerogenes (Previously Enterobacter) (23 @ 08:57)    Sensitivities:      Method Type: ANDERS      -  Amoxicillin/Clavulanic Acid: R >16/8      -  Ampicillin: R <=8 These ampicillin results predict results for amoxicillin      -  Ampicillin/Sulbactam: R <=4/2      -  Aztreonam: S <=4      -  Cefazolin: R >16      -  Cefepime: S <=2      -  Cefotaxime: S Enterobacter cloacae, Klebsiella aerogenes, and Citrobacter freundii may develop resistance during prolonged therapy.      -  Cefoxitin: R >16      -  Ceftazidime: S Enterobacter cloacae, Klebsiella aerogenes, and Citrobacter freundii may develop resistance during prolonged therapy.      -  Ceftriaxone: S <=1 Enterobacter cloacae, Klebsiella aerogenes, and Citrobacter freundii may develop resistance during prolonged therapy.      -  Ciprofloxacin: S <=0.25      -  Ertapenem: S <=0.5      -  Gentamicin: S <=2      -  Imipenem: S <=1      -  Levofloxacin: S <=0.5      -  Meropenem: S <=1      -  Piperacillin/Tazobactam: S <=8      -  Tobramycin: S <=2      -  Trimethoprim/Sulfamethoxazole: S <=0.5/9.5    Culture - Blood (collected 23 @ 15:50)  Source: .Blood Blood  Final Report (23 @ 23:00):    No growth at 5 days       SARS-CoV-2: NotDetec (11-15-23 @ 11:40)       < from: US Duplex Venous Lower Ext Complete, Bilateral (23 @ 11:55) >    FINDINGS:    RIGHT:  Normal compressibility of the RIGHT common femoral, femoral and popliteal   veins.  Doppler examination shows normal spontaneous and phasic flow.  No RIGHT calf vein thrombosis is detected.    LEFT:  Normal compressibility of the LEFT common femoral, femoral and popliteal   veins.  Doppler examination shows normal spontaneous and phasic flow.  No LEFT calf vein thrombosis is detected.    IMPRESSION:  No evidence of deep venous thrombosis in either lower extremity.        < end of copied text >                                              [facility]                                                INFECTIOUS DISEASES  =======================================================                     Uriel Vivas#   Roman Gonzáles MD#   Shan Wilson MD*                           Jennie Huerta MD*   Stephany Segura MD*            Diplomates American Board of Internal Medicine & Infectious Diseases                  # [place] Office - Appt - Tel  [phone] Fax [phone]                * [place] Office - Appt - Tel [phone] Fax [phone]                                  Hospital Consult line:  [phone]  =======================================================      [id number]  DEUCE BALL   HPI:  82y M with PMH HTN, CAD s/p stents, renal cell carcinoma status post left nephrectomy, bladder CA, BPH, CHF, LBBB, vertigo,  HLD, 5.5cm AAA without rupture post EVAR, paroxysmal A-fib on Eliquis, Plavix, and aspirin, early stage Alzheimer dementia transferred from [facility] s/p unwitnessed fall with head strike at home found by wife on floor at 8am. Patient brought to urgent care by wife and had 5 staples to posterior scalp but was instructed to go to nearest ED.  CT head at [place] showed R frontal IPH, SDH, SAH at 9:00. CTA stroke protocol not performed at [facility]. Patient BIB on 6L NC.  Pt GCS 15 on arrival, A&Ox2 on arrival. After initial assessment, patient noted to be vomiting enroute to CT scanner.          (10 Nov 2023 11:04)    ID called for worsening leukocytosis  Patient has been having intermittent fever during this admission, started on cefepime for klebsiella aerogenes tracheobronchitis, de-escalated to ceftriaxone due to concern for mental status change on cefepime. Today noted with uptrending WBc      I have personally reviewed the labs and data; pertinent labs and data are listed in this note; please see below.   =======================================================  Past Medical & Surgical Hx:  =====================  PAST MEDICAL & SURGICAL HISTORY:  HTN  dx       Bladder Cancer (ICD9 188.9)  TCC, dx       Hyperlipemia (ICD9 272.4)  dx       Lt Renal Neoplasm  left - s/p left nephrectomy      Hx antineoplastic chemotherapy (BCG )      Left bundle branch block      Vertigo      Heel spur, left      Abdominal aortic aneurysm (AAA)  5.5 cm      BPH (benign prostatic hyperplasia)      Renal mass, right  current - following with Dr Santiago      CAD (coronary artery disease)      Bladder cancer, primary, with metastasis from bladder to other site  Left kidney      Acute renal failure, unspecified acute renal failure type      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)      Abdominal aortic aneurysm (AAA) without rupture      Congestive heart failure, unspecified chronicity, unspecified heart failure type      Hypertension      History of abdominal aortic aneurysm (AAA)      CAD (coronary artery disease)      Alzheimers disease      urethral Stent 2008  stent placement and removal      S/P Cystoscopy  bladder polyps removal multiple times (since ), 6/10 , 10/2011 & 10/17/2011, , 2012, last 13, 2013, 2014      S/P Tonsillectomy      History of Lt  Nephrectomy  6/10 - left      History of cystoscopy  2015      H/O abdominal aortic aneurysm repair      S/P AAA repair      History of nephrectomy  Left      Presence of stent in LAD coronary artery      History of heart artery stent  DIONICIO        Problem List:  ==========  HEALTH ISSUES - PROBLEM Dx:  Paroxysmal atrial fibrillation    DONNIE (acute kidney injury)    Abnormal echocardiogram    Endocarditis          Social Hx:  =======  no toxic habits currently    FAMILY HISTORY:  Family history of MI (myocardial infarction) (Father, Mother)  father  74y/o MI, mother  94y/o alzheimers    Family history of early CAD (Father, Mother)    no significant family history of immunosuppressive disorders in mother or father   =======================================================    REVIEW OF SYSTEMS:  CONSTITUTIONAL:  No Fever or chills  HEENT:  No diplopia or blurred vision.  No earache, sore throat or runny nose.  CARDIOVASCULAR:  No pressure, squeezing, strangling, tightness, heaviness or aching about the chest, neck, axilla or epigastrium.  RESPIRATORY:  No cough, shortness of breath  GASTROINTESTINAL:  No nausea, vomiting or diarrhea.  GENITOURINARY:  No dysuria, frequency or urgency. No Blood in urine  MUSCULOSKELETAL:  no joint aches, no muscle pain  SKIN:  No change in skin, hair or nails.  NEUROLOGIC:  No Headaches, seizures or weakness.  PSYCHIATRIC:  No disorder of thought or mood.  ENDOCRINE:  No heat or cold intolerance  HEMATOLOGICAL:  No easy bruising or bleeding.    =======================================================  Allergies    penicillin (Rash)  heparin (Other (Severe))  penicillin (Hives)  Cipro (Other)  Levaquin (Other)  heparin (Other)    Intolerances    Antibiotics:    Other medications:  acetylcysteine 20%  Inhalation 4 milliLiter(s) Inhalation every 6 hours  amantadine 100 milliGRAM(s) Oral <User Schedule>  aspirin  chewable 81 milliGRAM(s) Oral daily  atorvastatin 40 milliGRAM(s) Oral at bedtime  chlorhexidine 2% Cloths 1 Application(s) Topical <User Schedule>  diltiazem    Tablet 60 milliGRAM(s) Oral <User Schedule>  folic acid 1 milliGRAM(s) Oral daily  fondaparinux Injectable 1.2 milliGRAM(s) SubCutaneous <User Schedule>  levalbuterol Inhalation 1.25 milliGRAM(s) Inhalation every 6 hours  melatonin 3 milliGRAM(s) Oral at bedtime  metoprolol tartrate 25 milliGRAM(s) Oral <User Schedule>  Nephro-roma 1 Tablet(s) Oral daily  polyethylene glycol 3350 17 Gram(s) Oral daily  senna 2 Tablet(s) Oral at bedtime     cefepime  Injectable.   2000 milliGRAM(s) IV Push (23 @ 18:16)    cefepime  Injectable.   2000 milliGRAM(s) IV Push (11-15-23 @ 05:29)   2000 milliGRAM(s) IV Push (11-15-23 @ 17:39)   2000 milliGRAM(s) IV Push (23 @ 05:30)   2000 milliGRAM(s) IV Push (23 @ 17:10)   2000 milliGRAM(s) IV Push (23 @ 05:30)   2000 milliGRAM(s) IV Push (23 @ 17:08)   2000 milliGRAM(s) IV Push (23 @ 05:09)   2000 milliGRAM(s) IV Push (23 @ 18:04)   2000 milliGRAM(s) IV Push (23 @ 06:07)    cefTRIAXone Injectable.   1000 milliGRAM(s) IV Push (23 @ 14:03)   1000 milliGRAM(s) IV Push (23 @ 18:41)      ======================================================  Physical Exam:  ============  T(F): 99 (2023 12:00), Max: 100.9 (2023 22:00)  HR: 78 (2023 15:27)  BP: 160/98 (2023 13:00)  RR: 19 (2023 13:00)  SpO2: 100% (2023 13:00) (97% - 100%)  temp max in last 48H T(F): , Max: 100.9 (23 @ 22:00)    General:  No acute distress. elderly male laying in bed, on o2  Eye: Pupils are equal, round and reactive to light, Normal conjunctiva.  HENT: rt cranial flap soft, staples intact  Neck: Supple, No lymphadenopathy.  Respiratory: Lungs are clear to auscultation, Respirations are non-labored.  Cardiovascular: Normal rate, Regular rhythm, s1 + s2 +mildred  Gastrointestinal: Soft, Non-tender, Non-distended, Normal bowel sounds.  Genitourinary: No costovertebral angle tenderness. hall in place  Integumentary: No rash. no phlebitis, LUE forearm skin tear  Neurologic:  awake, axox2, following commands,  rue/rle 4+, LUE 0/5, LLE 2+  Psychiatric: Appropriate mood & affect.    =======================================================  Labs:                        9.1    27.82 )-----------( 368      ( 2023 01:20 )             27.0         143  |  107  |  42.4<H>  ----------------------------<  138<H>  4.3   |  21.0<L>  |  1.43<H>    Ca    9.4      2023 01:20  Phos  3.7       Mg     2.2             Culture - Blood (collected 23 @ 11:28)  Source: .Blood Blood-Peripheral    Culture - Blood (collected 23 @ 11:26)  Source: .Blood Blood-Peripheral    Culture - Blood (collected 23 @ 07:40)  Source: .Blood Blood-Peripheral  Final Report (23 @ 14:01):    No growth at 5 days    Culture - Blood (collected 23 @ 17:55)  Source: .Blood Blood-Peripheral  Final Report (23 @ 02:00):    No growth at 5 days    Culture - Blood (collected 23 @ 16:19)  Source: .Blood Blood  Final Report (23 @ 23:00):    No growth at 5 days    Culture - Sputum (collected 23 @ 16:11)  Source: ET Tube ET Tube  Gram Stain (23 @ 11:50):    Numerous polymorphonuclear leukocytes per low power field    Few Squamous epithelial cells per low power field    Few Yeast like cells per oil power field    Numerous Gram Negative Rods per oil power field  Final Report (23 @ 08:57):    Numerous Klebsiella aerogenes (Previously Enterobacter)    Normal Respiratory Isabell present  Organism: Klebsiella aerogenes (Previously Enterobacter) (23 @ 08:57)  Organism: Klebsiella aerogenes (Previously Enterobacter) (23 @ 08:57)    Sensitivities:      Method Type: ANDERS      -  Amoxicillin/Clavulanic Acid: R >16/8      -  Ampicillin: R <=8 These ampicillin results predict results for amoxicillin      -  Ampicillin/Sulbactam: R <=4/2      -  Aztreonam: S <=4      -  Cefazolin: R >16      -  Cefepime: S <=2      -  Cefotaxime: S Enterobacter cloacae, Klebsiella aerogenes, and Citrobacter freundii may develop resistance during prolonged therapy.      -  Cefoxitin: R >16      -  Ceftazidime: S Enterobacter cloacae, Klebsiella aerogenes, and Citrobacter freundii may develop resistance during prolonged therapy.      -  Ceftriaxone: S <=1 Enterobacter cloacae, Klebsiella aerogenes, and Citrobacter freundii may develop resistance during prolonged therapy.      -  Ciprofloxacin: S <=0.25      -  Ertapenem: S <=0.5      -  Gentamicin: S <=2      -  Imipenem: S <=1      -  Levofloxacin: S <=0.5      -  Meropenem: S <=1      -  Piperacillin/Tazobactam: S <=8      -  Tobramycin: S <=2      -  Trimethoprim/Sulfamethoxazole: S <=0.5/9.5    Culture - Blood (collected 23 @ 15:50)  Source: .Blood Blood  Final Report (23 @ 23:00):    No growth at 5 days       SARS-CoV-2: NotDetec (11-15-23 @ 11:40)       < from: US Duplex Venous Lower Ext Complete, Bilateral (23 @ 11:55) >    FINDINGS:    RIGHT:  Normal compressibility of the RIGHT common femoral, femoral and popliteal   veins.  Doppler examination shows normal spontaneous and phasic flow.  No RIGHT calf vein thrombosis is detected.    LEFT:  Normal compressibility of the LEFT common femoral, femoral and popliteal   veins.  Doppler examination shows normal spontaneous and phasic flow.  No LEFT calf vein thrombosis is detected.    IMPRESSION:  No evidence of deep venous thrombosis in either lower extremity.        < end of copied text >      < from: TTE Echo Limited or F/U (23 @ 20:01) >    Summary:   1. This is a nondiagnostic study to evaluate aortic valve because of   poor windows, patient was agitated.      As recommended earlier, Consider WHIT to evaluate Aortic Vavle.    MD Keri Electronically signed on 2023 at 9:12:23 AM      < end of copied text >    < from: TTE Echo Complete w/o Contrast w/ Doppler (23 @ 08:58) >  Summary:   1. Endocardial visualization was enhanced with intravenous echo contrast.   2. Normal global left ventricular systolic function.   3. There is mild concentric left ventricular hypertrophy.   4. Left ventricular ejection fraction, by visual estimation, is 60 to   65%.   5. Spectral Doppler shows impaired relaxation pattern of left   ventricular myocardial filling (Grade I diastolic dysfunction).   6. Elevated mean left atrial pressure.   7. Mildly enlarged right ventricle.   8. Normal left atrial size.   9. Normal right atrialsize.  10. Moderate paradoxial low gradient aortic stenosis.  11. The Dimesionless Index value is .39.  12. Moderately calcified aortic valve with mobile echodensity poorly   visualized. May represent artifact vs mobile calcification vs vegetation.   Consider WHIT if clinically indicated.  13. Mild aortic regurgitation.  14. Moderate thickening and calcification of the anterior and posterior   mitral valve leaflets.  15. Mild tricuspid regurgitation.    Celestino    < end of copied text >

## 2023-11-21 NOTE — PROGRESS NOTE ADULT - PROBLEM SELECTOR PLAN 1
- Patient still some Afib with RVR overnight.   - Maintaining SR at this time with no further PACs vs. Mobitz I.   - Continue metoprolol 25mg PO BID, and diltiazem 60mg PO BID.   - Continue aspirin and arixtra.   - Continue telemetry monitoring Patient rapid afib/flutter tachy earlier today, given Lopressor IV and then converted to SR with irregular beats noted.  Wenckebach vs PAC's.  ON aspirin and Arixtra DVT prophylaxis   Ct Metoprolol 25mg BID, Cardizem 60 BID  Ct telemetry monitoring  Wait for MD for finale recommendations. - Patient still some Afib with RVR overnight.   - Maintaining SR at this time with no further PACs vs. Mobitz I.   - Continue metoprolol 25mg PO BID, and diltiazem 60mg PO BID.   - Continue aspirin.  - Unable to be placed on AC due to recent R hemicraniectomy.   - Continue telemetry monitoring.

## 2023-11-21 NOTE — PROGRESS NOTE ADULT - NS ATTEND AMEND GEN_ALL_CORE FT
Patient seen and examined by me. Seen in presence of: n/a.    T(C): 37.2 (11-21-23 @ 12:00), Max: 38.3 (11-20-23 @ 22:00)  HR: 100 (11-21-23 @ 13:00) (68 - 107)  BP: 160/98 (11-21-23 @ 13:00) (100/62 - 160/98)  RR: 19 (11-21-23 @ 13:00) (13 - 20)  SpO2: 100% (11-21-23 @ 13:00) (97% - 100%)  Patient alert  Chest: Bilateral Clear BS, 2L NC  Cardiac: S1 and S2  Abdomen: Soft      SVT likely atrial fibrillation broke with IV lopressor administration by primary team.  No further AF episodes.  Not a candidate for WHIT at this time, limited echo was non-diagnostic.  Continue AV josie agents and uptitrate to keep Sustained rates between 60-80 unless indicated higher for non-cardiac indication.    No further cardiac plan at present. Please call with questions/concerns.    I have discussed my recommendation with the ACP which are outlined above. Thank you for allowing me to participate in the care of this patient.

## 2023-11-22 LAB
ANION GAP SERPL CALC-SCNC: 13 MMOL/L — SIGNIFICANT CHANGE UP (ref 5–17)
ANION GAP SERPL CALC-SCNC: 13 MMOL/L — SIGNIFICANT CHANGE UP (ref 5–17)
BUN SERPL-MCNC: 39.8 MG/DL — HIGH (ref 8–20)
BUN SERPL-MCNC: 39.8 MG/DL — HIGH (ref 8–20)
CALCIUM SERPL-MCNC: 8.9 MG/DL — SIGNIFICANT CHANGE UP (ref 8.4–10.5)
CALCIUM SERPL-MCNC: 8.9 MG/DL — SIGNIFICANT CHANGE UP (ref 8.4–10.5)
CHLORIDE SERPL-SCNC: 109 MMOL/L — HIGH (ref 96–108)
CHLORIDE SERPL-SCNC: 109 MMOL/L — HIGH (ref 96–108)
CO2 SERPL-SCNC: 23 MMOL/L — SIGNIFICANT CHANGE UP (ref 22–29)
CO2 SERPL-SCNC: 23 MMOL/L — SIGNIFICANT CHANGE UP (ref 22–29)
CREAT SERPL-MCNC: 1.44 MG/DL — HIGH (ref 0.5–1.3)
CREAT SERPL-MCNC: 1.44 MG/DL — HIGH (ref 0.5–1.3)
CULTURE RESULTS: SIGNIFICANT CHANGE UP
EGFR: 49 ML/MIN/1.73M2 — LOW
EGFR: 49 ML/MIN/1.73M2 — LOW
GLUCOSE SERPL-MCNC: 123 MG/DL — HIGH (ref 70–99)
GLUCOSE SERPL-MCNC: 123 MG/DL — HIGH (ref 70–99)
GRAM STN FLD: ABNORMAL
GRAM STN FLD: ABNORMAL
HCT VFR BLD CALC: 26.8 % — LOW (ref 39–50)
HCT VFR BLD CALC: 26.8 % — LOW (ref 39–50)
HGB BLD-MCNC: 9.1 G/DL — LOW (ref 13–17)
HGB BLD-MCNC: 9.1 G/DL — LOW (ref 13–17)
MAGNESIUM SERPL-MCNC: 2.2 MG/DL — SIGNIFICANT CHANGE UP (ref 1.6–2.6)
MAGNESIUM SERPL-MCNC: 2.2 MG/DL — SIGNIFICANT CHANGE UP (ref 1.6–2.6)
MCHC RBC-ENTMCNC: 31.8 PG — SIGNIFICANT CHANGE UP (ref 27–34)
MCHC RBC-ENTMCNC: 31.8 PG — SIGNIFICANT CHANGE UP (ref 27–34)
MCHC RBC-ENTMCNC: 34 GM/DL — SIGNIFICANT CHANGE UP (ref 32–36)
MCHC RBC-ENTMCNC: 34 GM/DL — SIGNIFICANT CHANGE UP (ref 32–36)
MCV RBC AUTO: 93.7 FL — SIGNIFICANT CHANGE UP (ref 80–100)
MCV RBC AUTO: 93.7 FL — SIGNIFICANT CHANGE UP (ref 80–100)
PHOSPHATE SERPL-MCNC: 2.9 MG/DL — SIGNIFICANT CHANGE UP (ref 2.4–4.7)
PHOSPHATE SERPL-MCNC: 2.9 MG/DL — SIGNIFICANT CHANGE UP (ref 2.4–4.7)
PLATELET # BLD AUTO: 350 K/UL — SIGNIFICANT CHANGE UP (ref 150–400)
PLATELET # BLD AUTO: 350 K/UL — SIGNIFICANT CHANGE UP (ref 150–400)
POTASSIUM SERPL-MCNC: 4 MMOL/L — SIGNIFICANT CHANGE UP (ref 3.5–5.3)
POTASSIUM SERPL-MCNC: 4 MMOL/L — SIGNIFICANT CHANGE UP (ref 3.5–5.3)
POTASSIUM SERPL-SCNC: 4 MMOL/L — SIGNIFICANT CHANGE UP (ref 3.5–5.3)
POTASSIUM SERPL-SCNC: 4 MMOL/L — SIGNIFICANT CHANGE UP (ref 3.5–5.3)
RBC # BLD: 2.86 M/UL — LOW (ref 4.2–5.8)
RBC # BLD: 2.86 M/UL — LOW (ref 4.2–5.8)
RBC # FLD: 12.6 % — SIGNIFICANT CHANGE UP (ref 10.3–14.5)
RBC # FLD: 12.6 % — SIGNIFICANT CHANGE UP (ref 10.3–14.5)
SODIUM SERPL-SCNC: 145 MMOL/L — SIGNIFICANT CHANGE UP (ref 135–145)
SODIUM SERPL-SCNC: 145 MMOL/L — SIGNIFICANT CHANGE UP (ref 135–145)
SPECIMEN SOURCE: SIGNIFICANT CHANGE UP
SURGICAL PATHOLOGY STUDY: SIGNIFICANT CHANGE UP
SURGICAL PATHOLOGY STUDY: SIGNIFICANT CHANGE UP
WBC # BLD: 18.34 K/UL — HIGH (ref 3.8–10.5)
WBC # BLD: 18.34 K/UL — HIGH (ref 3.8–10.5)
WBC # FLD AUTO: 18.34 K/UL — HIGH (ref 3.8–10.5)
WBC # FLD AUTO: 18.34 K/UL — HIGH (ref 3.8–10.5)

## 2023-11-22 PROCEDURE — 70450 CT HEAD/BRAIN W/O DYE: CPT | Mod: 26

## 2023-11-22 PROCEDURE — 99291 CRITICAL CARE FIRST HOUR: CPT

## 2023-11-22 PROCEDURE — 74176 CT ABD & PELVIS W/O CONTRAST: CPT | Mod: 26

## 2023-11-22 PROCEDURE — 99232 SBSQ HOSP IP/OBS MODERATE 35: CPT

## 2023-11-22 PROCEDURE — 71250 CT THORAX DX C-: CPT | Mod: 26

## 2023-11-22 PROCEDURE — 93931 UPPER EXTREMITY STUDY: CPT | Mod: 26,LT

## 2023-11-22 RX ORDER — APIXABAN 2.5 MG/1
5 TABLET, FILM COATED ORAL EVERY 12 HOURS
Refills: 0 | Status: DISCONTINUED | OUTPATIENT
Start: 2023-11-22 | End: 2023-11-26

## 2023-11-22 RX ADMIN — APIXABAN 5 MILLIGRAM(S): 2.5 TABLET, FILM COATED ORAL at 12:36

## 2023-11-22 RX ADMIN — CHLORHEXIDINE GLUCONATE 1 APPLICATION(S): 213 SOLUTION TOPICAL at 06:15

## 2023-11-22 RX ADMIN — Medication 10 MILLIGRAM(S): at 01:19

## 2023-11-22 RX ADMIN — Medication 1 TABLET(S): at 12:38

## 2023-11-22 RX ADMIN — ATORVASTATIN CALCIUM 40 MILLIGRAM(S): 80 TABLET, FILM COATED ORAL at 22:34

## 2023-11-22 RX ADMIN — Medication 60 MILLIGRAM(S): at 17:20

## 2023-11-22 RX ADMIN — LEVALBUTEROL 1.25 MILLIGRAM(S): 1.25 SOLUTION, CONCENTRATE RESPIRATORY (INHALATION) at 08:43

## 2023-11-22 RX ADMIN — LEVALBUTEROL 1.25 MILLIGRAM(S): 1.25 SOLUTION, CONCENTRATE RESPIRATORY (INHALATION) at 21:10

## 2023-11-22 RX ADMIN — MEROPENEM 1000 MILLIGRAM(S): 1 INJECTION INTRAVENOUS at 06:04

## 2023-11-22 RX ADMIN — LEVALBUTEROL 1.25 MILLIGRAM(S): 1.25 SOLUTION, CONCENTRATE RESPIRATORY (INHALATION) at 15:54

## 2023-11-22 RX ADMIN — Medication 25 MILLIGRAM(S): at 01:19

## 2023-11-22 RX ADMIN — LEVALBUTEROL 1.25 MILLIGRAM(S): 1.25 SOLUTION, CONCENTRATE RESPIRATORY (INHALATION) at 02:14

## 2023-11-22 RX ADMIN — Medication 25 MILLIGRAM(S): at 12:37

## 2023-11-22 RX ADMIN — MEROPENEM 1000 MILLIGRAM(S): 1 INJECTION INTRAVENOUS at 17:20

## 2023-11-22 RX ADMIN — Medication 81 MILLIGRAM(S): at 12:37

## 2023-11-22 RX ADMIN — Medication 1 MILLIGRAM(S): at 12:37

## 2023-11-22 RX ADMIN — Medication 4 MILLILITER(S): at 02:15

## 2023-11-22 RX ADMIN — Medication 3 MILLIGRAM(S): at 22:34

## 2023-11-22 RX ADMIN — Medication 4 MILLILITER(S): at 15:54

## 2023-11-22 RX ADMIN — Medication 60 MILLIGRAM(S): at 06:05

## 2023-11-22 RX ADMIN — Medication 4 MILLILITER(S): at 21:10

## 2023-11-22 RX ADMIN — Medication 100 MILLIGRAM(S): at 06:05

## 2023-11-22 RX ADMIN — APIXABAN 5 MILLIGRAM(S): 2.5 TABLET, FILM COATED ORAL at 22:34

## 2023-11-22 RX ADMIN — POLYETHYLENE GLYCOL 3350 17 GRAM(S): 17 POWDER, FOR SOLUTION ORAL at 12:37

## 2023-11-22 RX ADMIN — Medication 4 MILLILITER(S): at 08:43

## 2023-11-22 RX ADMIN — SENNA PLUS 2 TABLET(S): 8.6 TABLET ORAL at 22:34

## 2023-11-22 NOTE — PROGRESS NOTE ADULT - ASSESSMENT
82y M with PMH HTN, CAD s/p stents, renal cell carcinoma status post left nephrectomy, bladder CA, BPH, CHF, LBBB, vertigo,  HLD, 5.5cm AAA without rupture post EVAR, paroxysmal A-fib on Eliquis, Plavix, and aspirin, early stage Alzheimer dementia transferred from [facility] s/p unwitnessed fall with head strike at home found by wife on floor at 8am. Patient brought to urgent care by wife and had 5 staples to posterior scalp but was instructed to go to nearest ED.  CT head at [facility] showed R frontal IPH, SDH, SAH s/p decompressive craniectomy  ID called for worsening leukocytosis  Patient has been having intermittent fever during this admission, started on cefepime for klebsiella aerogenes tracheobronchitis, de-escalated to ceftriaxone due to concern for mental status change on cefepime. Today noted with uptrending WBC.    Leukocytosis  Fever  Tracheobronchitis, concern for ongoing aspiration  Penicillin allergy-unknown  SDH  LUe DVT and cephalic vein superficial thrombosis  RUE cephalic vein superficial thrombosis        - intermittent fever, afebrile today  - leukocytosis uptrending even prior to change cefepime-->ceftriaxone, now improving on meropenem  - UA from 11/17 is neg  - repeat BCX  - repeat sputum CX + yeast, will defer treatment for now  - f/u Mri head-d/w team clinically low suspicion for CNS infection or surgical site infection  - check Ct C/Ap  - meropenem 1g iv q12h  - initial TTE with ?  AV vegetation repeat TTE 11/21 nondiagnostic  - Trend Fever  - Trend Leukocytosis    d/w NSICU  Will Follow 82y M with PMH HTN, CAD s/p stents, renal cell carcinoma status post left nephrectomy, bladder CA, BPH, CHF, LBBB, vertigo,  HLD, 5.5cm AAA without rupture post EVAR, paroxysmal A-fib on Eliquis, Plavix, and aspirin, early stage Alzheimer dementia transferred from [facility] s/p unwitnessed fall with head strike at home found by wife on floor at 8am. Patient brought to urgent care by wife and had 5 staples to posterior scalp but was instructed to go to nearest ED.  CT head at [place] showed R frontal IPH, SDH, SAH s/p decompressive craniectomy  ID called for worsening leukocytosis  Patient has been having intermittent fever during this admission, started on cefepime for klebsiella aerogenes tracheobronchitis, de-escalated to ceftriaxone due to concern for mental status change on cefepime. Today noted with uptrending WBC.    Leukocytosis  Fever  Tracheobronchitis, concern for ongoing aspiration  Penicillin allergy-unknown  SDH  LUe DVT and cephalic vein superficial thrombosis  RUE cephalic vein superficial thrombosis        - intermittent fever, afebrile today  - leukocytosis uptrending even prior to change cefepime-->ceftriaxone, now improving on meropenem  - UA from 11/17 is neg  - repeat BCX  - repeat sputum CX + yeast, will defer treatment for now  - f/u Mri head-d/w team clinically low suspicion for CNS infection or surgical site infection. d/w team pt with waxing and waning mental status  - check Ct C/Ap  - meropenem 1g iv q12h  - initial TTE with ?  AV vegetation repeat TTE 11/21 nondiagnostic  - Trend Fever  - Trend Leukocytosis    d/w NSICU  Will Follow

## 2023-11-22 NOTE — PROGRESS NOTE ADULT - ASSESSMENT
81 yo male POD#12 s/p a right decompressive hemicraniectomy by Dr. Larios    -Neurochecks q2  -MRI Brain w/wo pending   -Pain Control, avoid over sedation  -Goal  to 160  -Flap remains full but soft, timing of cranioplasty TBD  -Monitor for Airway protection  -Keep HOB >30 degrees  -EEG negative. AED ppx d/c'd 7 days post op  -Free water flushes 250 mL q6h for hypernatremia, BMP q8h  -DVT ppx with SCD and fondaparinux (h/o HIT)  -Wean oxygen requirements as tolerated, chest PT.   -Medical management per NSICU team   -Plan of care to be discussed in AM with neurosurgery attending

## 2023-11-22 NOTE — PROGRESS NOTE ADULT - SUBJECTIVE AND OBJECTIVE BOX
HPI:  HPI:  82y M with PMH HTN, CAD s/p stents, renal cell carcinoma status post left nephrectomy, bladder CA, BPH, CHF, LBBB, vertigo,  HLD, 5.5cm AAA without rupture post EVAR, paroxysmal A-fib on Eliquis, Plavix, and aspirin, early stage Alzheimer dementia transferred from [facility] s/p unwitnessed fall with head strike at home found by wife on floor at 8am. Patient brought to urgent care by wife and had 5 staples to posterior scalp but was instructed to go to nearest ED.  CT head at [place] showed R frontal IPH, SDH, SAH at 9:00. CTA stroke protocol not performed at [facility]. Patient BIB on 6L NC.  Pt GCS 15 on arrival, A&Ox2 on arrival. After initial assessment, patient noted to be vomiting enroute to CT scanner.          (10 Nov 2023 11:04)      INTERVAL HPI/OVERNIGHT EVENTS:  83y Male s/p R hemicraniectomy POD#12 seen lying comfortably in bed. Intermittently lethargic. Neuro exam remains stable.     Vital Signs Last 24 Hrs  T(C): 37.8 (21 Nov 2023 19:00), Max: 38 (21 Nov 2023 01:00)  T(F): 100 (21 Nov 2023 19:00), Max: 100.4 (21 Nov 2023 01:00)  HR: 90 (21 Nov 2023 21:53) (68 - 100)  BP: 148/80 (21 Nov 2023 19:00) (100/62 - 160/98)  BP(mean): 99 (21 Nov 2023 19:00) (70 - 116)  RR: 18 (21 Nov 2023 19:00) (13 - 20)  SpO2: 99% (21 Nov 2023 21:53) (99% - 100%)    Parameters below as of 21 Nov 2023 21:53  Patient On (Oxygen Delivery Method): nasal cannula w/ humidification        PHYSICAL EXAM:  General: NAD  Head/Incision: c/d/i. Flap soft but full  Neuro:  -Mental status- No acute distress. AOx2 (person/place, states birthday instead of date), hypophonic but verbalizes  -CN- PERRL 3mm, EOMI, tongue midline, L facial  - following commands  - RUE 5/5, RLE 4/5, LUE 1/5, LLE trace wdrl    CV: Irregular RR  Pulm: no use accessory muscles. breathing unlabored and even.   Abd: Soft, nontender, nondistended  Ext: no noted edema in lower ext  Skin: warm, dry    LABS:                        9.1    27.82 )-----------( 368      ( 21 Nov 2023 01:20 )             27.0     11-21    143  |  107  |  42.4<H>  ----------------------------<  138<H>  4.3   |  21.0<L>  |  1.43<H>    Ca    9.4      21 Nov 2023 01:20  Phos  3.7     11-21  Mg     2.2     11-21        Urinalysis Basic - ( 21 Nov 2023 01:20 )    Color: x / Appearance: x / SG: x / pH: x  Gluc: 138 mg/dL / Ketone: x  / Bili: x / Urobili: x   Blood: x / Protein: x / Nitrite: x   Leuk Esterase: x / RBC: x / WBC x   Sq Epi: x / Non Sq Epi: x / Bacteria: x        11-20 @ 07:01 - 11-21 @ 07:00  --------------------------------------------------------  IN: 1180 mL / OUT: 575 mL / NET: 605 mL    11-21 @ 07:01 - 11-22 @ 00:59  --------------------------------------------------------  IN: 680 mL / OUT: 500 mL / NET: 180 mL        RADIOLOGY & ADDITIONAL TESTS:

## 2023-11-22 NOTE — PROGRESS NOTE ADULT - SUBJECTIVE AND OBJECTIVE BOX
[facility]                                                INFECTIOUS DISEASES  =======================================================                     Uriel Vivas#   Roman Gonzáles MD#   Shan Wilson MD*                           Jennie Huerta MD*   Stephany Segura MD*            Diplomates American Board of Internal Medicine & Infectious Diseases                  # [place] Office - Appt - Tel  [phone] Fax [phone]                * [place] Office - Appt - Tel [phone] Fax [phone]                                  Hospital Consult line:  [phone]  =======================================================      [facility]-[id number]  DEUCE LIZZY       ID f/u- fever, leukocytosis, aspiration pneumonia  afebrile today  wbc better  more lethargic today  found to have LUe DVT    I have personally reviewed the labs and data; pertinent labs and data are listed in this note; please see below.   =======================================================  Past Medical & Surgical Hx:  =====================  PAST MEDICAL & SURGICAL HISTORY:  HTN  dx 2008      Bladder Cancer (ICD9 188.9)  TCC, dx       Hyperlipemia (ICD9 272.4)  dx       Lt Renal Neoplasm  left - s/p left nephrectomy      Hx antineoplastic chemotherapy (BCG )      Left bundle branch block      Vertigo      Heel spur, left      Abdominal aortic aneurysm (AAA)  5.5 cm      BPH (benign prostatic hyperplasia)      Renal mass, right  current - following with Dr Pamela SHEARER (coronary artery disease)      Bladder cancer, primary, with metastasis from bladder to other site  Left kidney      Acute renal failure, unspecified acute renal failure type      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)      Abdominal aortic aneurysm (AAA) without rupture      Congestive heart failure, unspecified chronicity, unspecified heart failure type      Hypertension      History of abdominal aortic aneurysm (AAA)      CAD (coronary artery disease)      Alzheimers disease      urethral Stent 2008  stent placement and removal      S/P Cystoscopy  bladder polyps removal multiple times (since ), 6/10 , 10/2011 & 10/17/2011, , 2012, last 13, 2013, 2014      S/P Tonsillectomy      History of Lt  Nephrectomy  6/10 - left      History of cystoscopy  2015      H/O abdominal aortic aneurysm repair      S/P AAA repair      History of nephrectomy  Left      Presence of stent in LAD coronary artery      History of heart artery stent  DIONICIO        Problem List:  ==========  HEALTH ISSUES - PROBLEM Dx:  Paroxysmal atrial fibrillation    DONNIE (acute kidney injury)    Abnormal echocardiogram    Endocarditis          Social Hx:  =======  no toxic habits currently    FAMILY HISTORY:  Family history of MI (myocardial infarction) (Father, Mother)  father  76y/o MI, mother  96y/o alzheimers    Family history of early CAD (Father, Mother)    no significant family history of immunosuppressive disorders in mother or father   =======================================================    REVIEW OF SYSTEMS:  CONSTITUTIONAL:  No Fever or chills  HEENT:  No diplopia or blurred vision.  No earache, sore throat or runny nose.  CARDIOVASCULAR:  No pressure, squeezing, strangling, tightness, heaviness or aching about the chest, neck, axilla or epigastrium.  RESPIRATORY:  No cough, shortness of breath  GASTROINTESTINAL:  No nausea, vomiting or diarrhea.  GENITOURINARY:  No dysuria, frequency or urgency. No Blood in urine  MUSCULOSKELETAL:  no joint aches, no muscle pain  SKIN:  No change in skin, hair or nails.  NEUROLOGIC:  No Headaches, seizures or weakness.  PSYCHIATRIC:  No disorder of thought or mood.  ENDOCRINE:  No heat or cold intolerance  HEMATOLOGICAL:  No easy bruising or bleeding.    =======================================================  Allergies    penicillin (Rash)  heparin (Other (Severe))  penicillin (Hives)  Cipro (Other)  Levaquin (Other)  heparin (Other)    Intolerances    Antibiotics:    Other medications:  acetylcysteine 20%  Inhalation 4 milliLiter(s) Inhalation every 6 hours  amantadine 100 milliGRAM(s) Oral <User Schedule>  aspirin  chewable 81 milliGRAM(s) Oral daily  atorvastatin 40 milliGRAM(s) Oral at bedtime  chlorhexidine 2% Cloths 1 Application(s) Topical <User Schedule>  diltiazem    Tablet 60 milliGRAM(s) Oral <User Schedule>  folic acid 1 milliGRAM(s) Oral daily  fondaparinux Injectable 1.2 milliGRAM(s) SubCutaneous <User Schedule>  levalbuterol Inhalation 1.25 milliGRAM(s) Inhalation every 6 hours  melatonin 3 milliGRAM(s) Oral at bedtime  metoprolol tartrate 25 milliGRAM(s) Oral <User Schedule>  Nephro-roma 1 Tablet(s) Oral daily  polyethylene glycol 3350 17 Gram(s) Oral daily  senna 2 Tablet(s) Oral at bedtime     cefepime  Injectable.   2000 milliGRAM(s) IV Push (23 @ 18:16)    cefepime  Injectable.   2000 milliGRAM(s) IV Push (11-15-23 @ 05:29)   2000 milliGRAM(s) IV Push (11-15-23 @ 17:39)   2000 milliGRAM(s) IV Push (23 @ 05:30)   2000 milliGRAM(s) IV Push (23 @ 17:10)   2000 milliGRAM(s) IV Push (23 @ 05:30)   2000 milliGRAM(s) IV Push (23 @ 17:08)   2000 milliGRAM(s) IV Push (23 @ 05:09)   2000 milliGRAM(s) IV Push (23 @ 18:04)   2000 milliGRAM(s) IV Push (23 @ 06:07)    cefTRIAXone Injectable.   1000 milliGRAM(s) IV Push (23 @ 14:03)   1000 milliGRAM(s) IV Push (23 @ 18:41)      ======================================================  Physical Exam:  ============  Vital Signs Last 24 Hrs  T(C): 37.5 (2023 14:00), Max: 37.9 (2023 20:00)  T(F): 99.5 (2023 14:00), Max: 100.2 (2023 20:00)  HR: 78 (2023 14:00) (78 - 114)  BP: 145/75 (2023 14:00) (117/75 - 175/84)  BP(mean): 96 (2023 14:00) (84 - 122)  RR: 16 (:00) (14 - 21)  SpO2: 99% (2023 14:00) (96% - 100%)    Parameters below as of 2023 14:00    O2 Flow (L/min): 2      General:  No acute distress. elderly male laying in bed, on o2, lethargic  HENT: rt cranial flap soft, staples intact  Neck: Supple, No lymphadenopathy.  Respiratory: Lungs are clear to auscultation, Respirations are non-labored.  Cardiovascular: Normal rate, Regular rhythm, s1 + s2 +mildred  Gastrointestinal: Soft, Non-tender, Non-distended, Normal bowel sounds.  Genitourinary: No costovertebral angle tenderness. hall in place  Integumentary: No rash. no phlebitis, LUE forearm skin tear  Neurologic:  lethargic, arousable to voice    =======================================================  Labs:                      Culture - Blood (collected 23 @ 16:19)  Source: .Blood Blood  Final Report (23 @ 23:00):    No growth at 5 days    Culture - Sputum (collected 23 @ 16:11)  Source: ET Tube ET Tube  Gram Stain (23 @ 11:50):    Numerous polymorphonuclear leukocytes per low power field    Few Squamous epithelial cells per low power field    Few Yeast like cells per oil power field    Numerous Gram Negative Rods per oil power field  Final Report (23 @ 08:57):    Numerous Klebsiella aerogenes (Previously Enterobacter)    Normal Respiratory Isabell present  Organism: Klebsiella aerogenes (Previously Enterobacter) (23 @ 08:57)  Organism: Klebsiella aerogenes (Previously Enterobacter) (23 @ 08:57)    Sensitivities:      Method Type: ANDERS      -  Amoxicillin/Clavulanic Acid: R >16/8      -  Ampicillin: R <=8 These ampicillin results predict results for amoxicillin      -  Ampicillin/Sulbactam: R <=4/2      -  Aztreonam: S <=4      -  Cefazolin: R >16      -  Cefepime: S <=2      -  Cefotaxime: S Enterobacter cloacae, Klebsiella aerogenes, and Citrobacter freundii may develop resistance during prolonged therapy.      -  Cefoxitin: R >16      -  Ceftazidime: S Enterobacter cloacae, Klebsiella aerogenes, and Citrobacter freundii may develop resistance during prolonged therapy.      -  Ceftriaxone: S <=1 Enterobacter cloacae, Klebsiella aerogenes, and Citrobacter freundii may develop resistance during prolonged therapy.      -  Ciprofloxacin: S <=0.25      -  Ertapenem: S <=0.5      -  Gentamicin: S <=2      -  Imipenem: S <=1      -  Levofloxacin: S <=0.5      -  Meropenem: S <=1      -  Piperacillin/Tazobactam: S <=8      -  Tobramycin: S <=2      -  Trimethoprim/Sulfamethoxazole: S <=0.5/9.5    Culture - Blood (collected 23 @ 15:50)  Source: .Blood Blood  Final Report (23 @ 23:00):    No growth at 5 days       SARS-CoV-2: NotDetec (11-15-23 @ 11:40)       < from: US Duplex Venous Upper Ext Complete, Bilateral (23 @ 22:33) >  IMPRESSION:    Right upper extremity:    No acute DVT of the right upper extremity. Superficial thrombosis of the   cephalic vein with indwelling line, image 1.57.    Left upper extremity:    New DVT of one of the left upper extremity brachial veins. Along the   thrombosed left brachial vein, there is a focal extraluminal density   which may represent a hematoma, CINE 1.21 images 161-164, which is   closely apposed to the left brachial artery. Contour lobulation of the   left brachial artery in this location is noted, possibly a small   pseudoaneurysm. Recommend left upper extremity arterial duplex ultrasound   for further evaluation.    Additional persistent left cephalic vein superficial venous thrombosis.    EDBRA Sosa from Neuro ICU was informed of these findings on 2023 at   12:22AM.    < end of copied text >      < end of copied text >      < from: TTE Echo Limited or F/U (23 @ 20:01) >    Summary:   1. This is a nondiagnostic study to evaluate aortic valve because of   poor windows, patient was agitated.      As recommended earlier, Consider WHIT to evaluate Aortic Vavle.    MD Keri Electronically signed on 2023 at 9:12:23 AM      < end of copied text >    < from: TTE Echo Complete w/o Contrast w/ Doppler (23 @ 08:58) >  Summary:   1. Endocardial visualization was enhanced with intravenous echo contrast.   2. Normal global left ventricular systolic function.   3. There is mild concentric left ventricular hypertrophy.   4. Left ventricular ejection fraction, by visual estimation, is 60 to   65%.   5. Spectral Doppler shows impaired relaxation pattern of left   ventricular myocardial filling (Grade I diastolic dysfunction).   6. Elevated mean left atrial pressure.   7. Mildly enlarged right ventricle.   8. Normal left atrial size.   9. Normal right atrialsize.  10. Moderate paradoxial low gradient aortic stenosis.  11. The Dimesionless Index value is .39.  12. Moderately calcified aortic valve with mobile echodensity poorly   visualized. May represent artifact vs mobile calcification vs vegetation.   Consider WHIT if clinically indicated.  13. Mild aortic regurgitation.  14. Moderate thickening and calcification of the anterior and posterior   mitral valve leaflets.  15. Mild tricuspid regurgitation.    Celestino    < end of copied text >

## 2023-11-22 NOTE — PROGRESS NOTE ADULT - ASSESSMENT
82y M with TBI - R frontal IPH, SDH, tSAH; s/p R decompressive hemicraniectomy 11/10.  Encephalopathy due to TBI, resp. infection, resp. distress, delirium.  Respiratory distress due to impaired bulbar function with difficulty managing secretions, aspiration PNA.  PMH of CAD s/p stents, CHF; HTN, HLD. HIT, AAA post EVAR. Parox. A.Fib, on Cardizem.   PMH of renal cell carcinoma status post left nephrectomy, bladder CA, BPH  Nonoliguric DONNIE, likely ROXANNE, critical illness; improving.  AV vegetation on TTE - unclear etiology. Several BCx negative.  Hypernatremia.     Plan -  Neuro: EEG with epileptiform discharges 11/18  neurochecks q2h, protected sleep time  switch abx to avoid any component of cefepime encephalopathy  amantadine 100mg daily, renally dosed  MRI w/wo for infx work-up  melatonin at night  Mobility: PT/OT    Resp:   monitor resp status, wean off HFNC as tolerated  cont nebs + Mucomyst  aggressive chest PT, NT suctioning   CXR morning    CV:  cardiology consulted  maintain -150, HR<120  metoprolol 25mg BID, cardizem 60 BID  cont ASA 81 daily for CAD    GI:  hold TF for now, re-eval in am to restart; ulcer ppx, BM regimen    Renal:   monitor e-lytes and UOP  FWF to 250 q6hrs    ID: Klebs tracheobronchitis, 11/21 new leukocytosis >20  ID consulted for leukocytosis, fever  If febrile again, reculture  Trend fever curve and WBC    Heme:  SCDs, chemoppx with Fondaparinux, renal dose     Dispo: keep in NSICU today         82y M with TBI - R frontal IPH, SDH, tSAH; s/p R decompressive hemicraniectomy 11/10.  Encephalopathy due to TBI, resp. infection, resp. distress, delirium.  Respiratory distress due to impaired bulbar function with difficulty managing secretions, aspiration PNA.  PMH of CAD s/p stents, CHF; HTN, HLD. HIT, AAA post EVAR. Parox. A.Fib, on Cardizem.   PMH of renal cell carcinoma status post left nephrectomy, bladder CA, BPH  Nonoliguric DONNIE, likely ROXANNE, critical illness; improving.  AV vegetation on TTE - unclear etiology. Several BCx negative.  Hypernatremia.     Plan -  Neuro: EEG with epileptiform discharges 11/18  neurochecks q2h, protected sleep time  amantadine 100mg daily, renally dosed  MRI w/wo for infx work-up  melatonin at night  Mobility: PT/OT    Resp:   monitor resp status,  cont nebs + Mucomyst  aggressive chest PT, NT suctioning     CV:  cardiology consulted  maintain -150, HR<120  metoprolol 25mg BID, cardizem 60 BID  cont ASA 81 daily for CAD    GI:  hold puree diet until more awake  restart TF; ulcer ppx, BM regimen  LBM 11/22    Renal:   monitor e-lytes and UOP  FWF to 250 q6hrs  ext catheter    ID: Klebs tracheobronchitis, 11/21 new leukocytosis >20  ID consulted for leukocytosis, fever  continue meropenem  Trend fever curve and WBC    Heme:  SCDs, transition to full AC subcut argatroban, renal dose   h/o HIT- no heparin  LUE brachial DVT  remove midline   arterial doppler pending UE

## 2023-11-22 NOTE — CONSULT NOTE ADULT - SUBJECTIVE AND OBJECTIVE BOX
Vascular Attending:        Vascular Surgery HPI:  Admission Hpi below reviewed  multiple comorbidities, singular kidney   past history of AAA, EVAR.. with reported postoperative renal failure resulting in a course of HD  Renal function restored post renal artery stenting by patient's vascular surgeon  (Anastasia)_  Patient currently presenting with unwitnessed fall, resulting in ICH.. Now s/p right Hemicraniectomy  work up performed for leukocytosis/fever; Non contrast CT exhibiting infrarenal endograph, with reported increase in sac size compared to prior studies  Vascular surgery eval requested due to concerns for possible endoleak   Patient's daughter at bedside providing assistance with HPI        HPI:  82y M with PMH HTN, CAD s/p stents, renal cell carcinoma status post left nephrectomy, bladder CA, BPH, CHF, LBBB, vertigo,  HLD, 5.5cm AAA without rupture post EVAR, paroxysmal A-fib on Eliquis, Plavix, and aspirin, early stage Alzheimer dementia transferred from [facility] s/p unwitnessed fall with head strike at home found by wife on floor at 8am. Patient brought to urgent care by wife and had 5 staples to posterior scalp but was instructed to go to nearest ED.  CT head at [place] showed R frontal IPH, SDH, SAH at 9:00. CTA stroke protocol not performed at [facility]. Patient BIB on 6L NC.  Pt GCS 15 on arrival, A&Ox2 on arrival. After initial assessment, patient noted to be vomiting enroute to CT scanner.          (10 Nov 2023 11:04)      PAST MEDICAL & SURGICAL HISTORY:  HTN  dx 2008      Bladder Cancer (ICD9 188.9)  TCC, dx 1999      Hyperlipemia (ICD9 272.4)  dx 2008      Lt Renal Neoplasm  left - s/p left nephrectomy      Hx antineoplastic chemotherapy (BCG 2012)      Left bundle branch block      Vertigo      Heel spur, left      Abdominal aortic aneurysm (AAA)  5.5 cm      BPH (benign prostatic hyperplasia)      Renal mass, right  current - following with Dr Pamela SHEARER (coronary artery disease)      Bladder cancer, primary, with metastasis from bladder to other site  Left kidney      Acute renal failure, unspecified acute renal failure type      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)      Abdominal aortic aneurysm (AAA) without rupture      Congestive heart failure, unspecified chronicity, unspecified heart failure type      Hypertension      History of abdominal aortic aneurysm (AAA)      CAD (coronary artery disease)      Alzheimers disease      urethral Stent 7/2008  stent placement and removal      S/P Cystoscopy  bladder polyps removal multiple times (since 1999), 6/10 , 10/2011 & 10/17/2011, 5/12, 11/2012, last 5/13/13, 12/2013, 7/2014      S/P Tonsillectomy      History of Lt  Nephrectomy  6/10 - left      History of cystoscopy  April 2015      H/O abdominal aortic aneurysm repair      S/P AAA repair      History of nephrectomy  Left      Presence of stent in LAD coronary artery      History of heart artery stent  DIONICIO          REVIEW OF SYSTEMS      General:	    Skin/Breast:  	  Ophthalmologic:  	  ENMT:	    Respiratory and Thorax:  	  Cardiovascular:	    Gastrointestinal:	    Genitourinary:	    Musculoskeletal:	    Neurological:	    Psychiatric:	    Hematology/Lymphatics:	    Endocrine:	    Allergic/Immunologic:	    MEDICATIONS  (STANDING):  acetylcysteine 20%  Inhalation 4 milliLiter(s) Inhalation every 6 hours  amantadine 100 milliGRAM(s) Oral <User Schedule>  apixaban 5 milliGRAM(s) Oral every 12 hours  aspirin  chewable 81 milliGRAM(s) Oral daily  atorvastatin 40 milliGRAM(s) Oral at bedtime  chlorhexidine 2% Cloths 1 Application(s) Topical <User Schedule>  diltiazem    Tablet 60 milliGRAM(s) Oral <User Schedule>  folic acid 1 milliGRAM(s) Oral daily  levalbuterol Inhalation 1.25 milliGRAM(s) Inhalation every 6 hours  melatonin 3 milliGRAM(s) Oral at bedtime  meropenem Injectable 1000 milliGRAM(s) IV Push every 12 hours  meropenem Injectable      metoprolol tartrate 25 milliGRAM(s) Oral <User Schedule>  Nephro-roma 1 Tablet(s) Oral daily  polyethylene glycol 3350 17 Gram(s) Oral daily  senna 2 Tablet(s) Oral at bedtime    MEDICATIONS  (PRN):  acetaminophen   Oral Liquid .. 800 milliGRAM(s) Oral every 6 hours PRN Temp greater or equal to 38C (100.4F)  hydrALAZINE Injectable 10 milliGRAM(s) IV Push every 2 hours PRN SBP>160  labetalol Injectable 10 milliGRAM(s) IV Push every 2 hours PRN SBP>160      Allergies    penicillin (Rash)  heparin (Other (Severe))  penicillin (Hives)  Cipro (Other)  Levaquin (Other)  heparin (Other)    Intolerances        SOCIAL HISTORY: non contributory       Vital Signs Last 24 Hrs  T(C): 37.4 (22 Nov 2023 16:00), Max: 37.9 (21 Nov 2023 20:00)  T(F): 99.3 (22 Nov 2023 16:00), Max: 100.2 (21 Nov 2023 20:00)  HR: 74 (22 Nov 2023 16:00) (74 - 114)  BP: 138/80 (22 Nov 2023 16:00) (117/75 - 175/84)  BP(mean): 98 (22 Nov 2023 16:00) (84 - 122)  RR: 15 (22 Nov 2023 16:00) (14 - 21)  SpO2: 100% (22 Nov 2023 16:00) (96% - 100%)    Parameters below as of 22 Nov 2023 16:00    O2 Flow (L/min): 2      PHYSICAL EXAM:      Constitutional: lethargic , right sided head frontal deformity    Eyes: noscleral icterus     ENMT: moist membranes     Neck: supple     Respiratory: non [id number]     Cardiovascular:     Gastrointestinal: soft, non tender, no gross pulsation     Genitourinary: condom urine drainage system in place     Rectal: not examined     Extremities: warm, no calf tenderness elicited     Vascular: equal brachials /ulnar, difficult to palpate the right radial    Neurological: noted left upper/lower side weakness     Skin: scattered ecchymosis       Psychiatric: unable to full assess       Pulses:   Right:                                                                          Left:  FEM [ ]2+ [ ]1+ [ ]doppler                                             FEM [ ]2+ [ ]1+ [ ]doppler    POP [ x]2+ [ ]1+ [ ]doppler                                             POP [x ]2+ [ ]1+ [ ]doppler    DP [ ]2+ [ ]1+ [ ]doppler                                                DP [ ]2+ [ ]1+ [ ]doppler  PT[ ]2+ [ ]1+ [ ]doppler                                                  PT [ ]2+ [ ]1+ [ ]doppler      LABS:                        9.1    18.34 )-----------( 350      ( 22 Nov 2023 04:00 )             26.8     11-22    145  |  109<H>  |  39.8<H>  ----------------------------<  123<H>  4.0   |  23.0  |  1.44<H>    Ca    8.9      22 Nov 2023 04:00  Phos  2.9     11-22  Mg     2.2     11-22        Urinalysis Basic - ( 22 Nov 2023 04:00 )    Color: x / Appearance: x / SG: x / pH: x  Gluc: 123 mg/dL / Ketone: x  / Bili: x / Urobili: x   Blood: x / Protein: x / Nitrite: x   Leuk Esterase: x / RBC: x / WBC x   Sq Epi: x / Non Sq Epi: x / Bacteria: x        RADIOLOGY & ADDITIONAL STUDIES    < from: CT Abdomen and Pelvis No Cont (11.22.23 @ 03:01) >    ACC: [id number] EXAM:  CT ABDOMEN AND PELVIS   ORDERED BY: JOHANNE MCKEE     ACC: [id number] EXAM:  CT CHEST   ORDERED BY: JOHANNE MCKEE     PROCEDURE DATE:  11/22/2023          INTERPRETATION:  CLINICAL INFORMATION: Leukocytosis    COMPARISON: CT 2/25/2018, 4/3/2018    CONTRAST/COMPLICATIONS:  IV Contrast: NONE  Oral Contrast: NONE  Complications: None reported at time of study completion    PROCEDURE:  CT of the Chest, Abdomen and Pelvis was performed.  Sagittal and coronal reformats were performed.    FINDINGS:  CHEST:  LUNGS AND LARGE AIRWAYS: Patent central airways. Dependent linear   atelectasis/scarring.  PLEURA: No pleural effusion.  VESSELS: Normal caliber ascending thoracic aorta. Descending thoracic   aorta measures 3.2 cm at the level of the left atrium. Normal caliber   main pulmonary artery.  HEART: Heart size is normal. No pericardial effusion. Severe coronary   artery calcifications and/or stents. Aortic and mitral annular   calcifications as well as calcifications of the aorticvalve leaflets.  MEDIASTINUM AND CHERY: No lymphadenopathy.  CHEST WALL AND LOWER NECK: Within normal limits.    ABDOMEN AND PELVIS:  LIVER: Within normal limits.  BILE DUCTS: Normal caliber.  GALLBLADDER: Contracted  SPLEEN: Within normal limits.  PANCREAS: Within normal limits.  ADRENALS: Within normal limits.  KIDNEYS/URETERS: Left nephrectomy. No right hydronephrosis. Simple and   hemorrhagic right renal cysts.    BLADDER: Within normal limits.  REPRODUCTIVE ORGANS: Mildly enlarged prostate gland.    BOWEL: No bowel obstruction. Appendix is not visualized. Enteric tube is   coiled within the gastric lumen.  PERITONEUM: No ascites.  VESSELS: Status post endovascular repair of the infrarenal abdominal   aorta with right renal artery stent. The native aneurysm sac is enlarged   compared with CT 4/3/2018, measuring 8.0 x 7.1 cm (3-186), previously 5.6   x 4.4 cm at a similar level. Heterogeneous attenuation within the   aneurysm sac, not well assessed without intravenous contrast.   Embolization coils are noted anteriorly and posteriorly. Dilated left   common iliac artery measuring up to 2.2 cm, previously 1.6 cm.  RETROPERITONEUM/LYMPH NODES: No lymphadenopathy.  ABDOMINAL WALL: Within normal limits.  BONES: Degenerative changes.    IMPRESSION:  Endovascular repair of infrarenal abdominal aorta with increase in size   of the native aneurysm sac compared to CT 4/3/2018, measuring up to 8 cm.   Heterogeneous attenuation of the native aneurysm sac is suspicious for   endoleak. However, anterior and posterior embolization coils are also new   compared with the prior CT, possibly representing prior endoleak repair.   Correlate with patient history. Consider CT with intravenous contrast to   further characterization if clinically indicated.    Otherwise no acute pathology in the chest, abdomen, or pelvis.    ALYSSIA PURI DO; Attending Radiologist  This document has been electronically signed. Nov 22 2023  2:23PM    < end of copied text >    < from: US Duplex Arterial Upper Ext Ltd, Left (11.22.23 @ 11:27) >    ACC: [id number] EXAM:  US DPLX UPR EXT ARTS LTD LT   ORDERED BY: TY MORROW     PROCEDURE DATE:  11/22/2023          INTERPRETATION:  History: A prior examination, dated 11/21/2023, showed   left brachial vein DVT and questioned the presence of a left brachial   artery aneurysm.    A left brachial vein does appear to be thrombosed.    There is a normal triphasic pattern of unimpeded arterial flow through   the left subclavian artery, axillary artery and left brachial artery.    The leftradial artery is occluded in the distal forearm.    The left ulnar artery is patent with unimpeded antegrade flow.    On this examination, no pseudoaneurysm or hematoma is identified.    IMPRESSION:    No pseudoaneurysm arising from the left brachialartery is identified.    The left radial artery in the distal forearm is thrombosed.    --- End of Report ---            IKER PICKERING MD; Attending Interventional Radiologist  This document has been electronically signed. Nov 22 2023 12:09PM    < end of copied text >                Impression and Plan: Vascular Attending:        Vascular Surgery HPI:  Admission Hpi below reviewed  multiple comorbidities, singular kidney   past history of AAA, EVAR.. with reported postoperative renal failure resulting in a course of HD  Renal function restored post renal artery stenting by patient's vascular surgeon  (Anastasia)_  Patient currently presenting with unwitnessed fall, resulting in ICH.. Now s/p right Hemicraniectomy  work up performed for leukocytosis/fever; Non contrast CT exhibiting infrarenal endograph, with reported increase in sac size compared to prior studies  Vascular surgery eval requested due to concerns for possible endoleak   Patient's daughter at bedside providing assistance with HPI        HPI:  82y M with PMH HTN, CAD s/p stents, renal cell carcinoma status post left nephrectomy, bladder CA, BPH, CHF, LBBB, vertigo,  HLD, 5.5cm AAA without rupture post EVAR, paroxysmal A-fib on Eliquis, Plavix, and aspirin, early stage Alzheimer dementia transferred from [facility] s/p unwitnessed fall with head strike at home found by wife on floor at 8am. Patient brought to urgent care by wife and had 5 staples to posterior scalp but was instructed to go to nearest ED.  CT head at [place] showed R frontal IPH, SDH, SAH at 9:00. CTA stroke protocol not performed at [facility]. Patient BIB on 6L NC.  Pt GCS 15 on arrival, A&Ox2 on arrival. After initial assessment, patient noted to be vomiting enroute to CT scanner.          (10 Nov 2023 11:04)      PAST MEDICAL & SURGICAL HISTORY:  HTN  dx 2008      Bladder Cancer (ICD9 188.9)  TCC, dx 1999      Hyperlipemia (ICD9 272.4)  dx 2008      Lt Renal Neoplasm  left - s/p left nephrectomy      Hx antineoplastic chemotherapy (BCG 2012)      Left bundle branch block      Vertigo      Heel spur, left      Abdominal aortic aneurysm (AAA)  5.5 cm      BPH (benign prostatic hyperplasia)      Renal mass, right  current - following with Dr Pamela SHEARER (coronary artery disease)      Bladder cancer, primary, with metastasis from bladder to other site  Left kidney      Acute renal failure, unspecified acute renal failure type      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)      Abdominal aortic aneurysm (AAA) without rupture      Congestive heart failure, unspecified chronicity, unspecified heart failure type      Hypertension      History of abdominal aortic aneurysm (AAA)      CAD (coronary artery disease)      Alzheimers disease      urethral Stent 7/2008  stent placement and removal      S/P Cystoscopy  bladder polyps removal multiple times (since 1999), 6/10 , 10/2011 & 10/17/2011, 5/12, 11/2012, last 5/13/13, 12/2013, 7/2014      S/P Tonsillectomy      History of Lt  Nephrectomy  6/10 - left      History of cystoscopy  April 2015      H/O abdominal aortic aneurysm repair      S/P AAA repair      History of nephrectomy  Left      Presence of stent in LAD coronary artery      History of heart artery stent  DIONICIO          REVIEW OF SYSTEMS      General:	    Skin/Breast:  	  Ophthalmologic:  	  ENMT:	    Respiratory and Thorax:  	  Cardiovascular:	    Gastrointestinal:	    Genitourinary:	    Musculoskeletal:	    Neurological:	    Psychiatric:	    Hematology/Lymphatics:	    Endocrine:	    Allergic/Immunologic:	    MEDICATIONS  (STANDING):  acetylcysteine 20%  Inhalation 4 milliLiter(s) Inhalation every 6 hours  amantadine 100 milliGRAM(s) Oral <User Schedule>  apixaban 5 milliGRAM(s) Oral every 12 hours  aspirin  chewable 81 milliGRAM(s) Oral daily  atorvastatin 40 milliGRAM(s) Oral at bedtime  chlorhexidine 2% Cloths 1 Application(s) Topical <User Schedule>  diltiazem    Tablet 60 milliGRAM(s) Oral <User Schedule>  folic acid 1 milliGRAM(s) Oral daily  levalbuterol Inhalation 1.25 milliGRAM(s) Inhalation every 6 hours  melatonin 3 milliGRAM(s) Oral at bedtime  meropenem Injectable 1000 milliGRAM(s) IV Push every 12 hours  meropenem Injectable      metoprolol tartrate 25 milliGRAM(s) Oral <User Schedule>  Nephro-roma 1 Tablet(s) Oral daily  polyethylene glycol 3350 17 Gram(s) Oral daily  senna 2 Tablet(s) Oral at bedtime    MEDICATIONS  (PRN):  acetaminophen   Oral Liquid .. 800 milliGRAM(s) Oral every 6 hours PRN Temp greater or equal to 38C (100.4F)  hydrALAZINE Injectable 10 milliGRAM(s) IV Push every 2 hours PRN SBP>160  labetalol Injectable 10 milliGRAM(s) IV Push every 2 hours PRN SBP>160      Allergies    penicillin (Rash)  heparin (Other (Severe))  penicillin (Hives)  Cipro (Other)  Levaquin (Other)  heparin (Other)    Intolerances        SOCIAL HISTORY: non contributory       Vital Signs Last 24 Hrs  T(C): 37.4 (22 Nov 2023 16:00), Max: 37.9 (21 Nov 2023 20:00)  T(F): 99.3 (22 Nov 2023 16:00), Max: 100.2 (21 Nov 2023 20:00)  HR: 74 (22 Nov 2023 16:00) (74 - 114)  BP: 138/80 (22 Nov 2023 16:00) (117/75 - 175/84)  BP(mean): 98 (22 Nov 2023 16:00) (84 - 122)  RR: 15 (22 Nov 2023 16:00) (14 - 21)  SpO2: 100% (22 Nov 2023 16:00) (96% - 100%)    Parameters below as of 22 Nov 2023 16:00    O2 Flow (L/min): 2      PHYSICAL EXAM:      Constitutional: lethargic , right sided head frontal deformity    Eyes: noscleral icterus     ENMT: moist membranes     Neck: supple     Respiratory: non [id number]     Cardiovascular:     Gastrointestinal: soft, non tender, no gross pulsation     Genitourinary: condom urine drainage system in place     Rectal: not examined     Extremities: warm, no calf tenderness elicited     Vascular: equal brachials /ulnar, difficult to palpate the right radial    Neurological: noted left upper/lower side weakness     Skin: scattered ecchymosis       Psychiatric: unable to full assess       Pulses:   Right:                                                                          Left:  FEM [ ]2+ [ ]1+ [ ]doppler                                             FEM [ ]2+ [ ]1+ [ ]doppler    POP [ x]2+ [ ]1+ [ ]doppler                                             POP [x ]2+ [ ]1+ [ ]doppler    DP [ ]2+ [ ]1+ [ ]doppler                                                DP [ ]2+ [ ]1+ [ ]doppler  PT[ ]2+ [ ]1+ [ ]doppler                                                  PT [ ]2+ [ ]1+ [ ]doppler      LABS:                        9.1    18.34 )-----------( 350      ( 22 Nov 2023 04:00 )             26.8     11-22    145  |  109<H>  |  39.8<H>  ----------------------------<  123<H>  4.0   |  23.0  |  1.44<H>    Ca    8.9      22 Nov 2023 04:00  Phos  2.9     11-22  Mg     2.2     11-22        Urinalysis Basic - ( 22 Nov 2023 04:00 )    Color: x / Appearance: x / SG: x / pH: x  Gluc: 123 mg/dL / Ketone: x  / Bili: x / Urobili: x   Blood: x / Protein: x / Nitrite: x   Leuk Esterase: x / RBC: x / WBC x   Sq Epi: x / Non Sq Epi: x / Bacteria: x        RADIOLOGY & ADDITIONAL STUDIES    < from: CT Abdomen and Pelvis No Cont (11.22.23 @ 03:01) >    ACC: [id number] EXAM:  CT ABDOMEN AND PELVIS   ORDERED BY: JOHANNE MCKEE     ACC: [id number] EXAM:  CT CHEST   ORDERED BY: JOHANNE MCKEE     PROCEDURE DATE:  11/22/2023          INTERPRETATION:  CLINICAL INFORMATION: Leukocytosis    COMPARISON: CT 2/25/2018, 4/3/2018    CONTRAST/COMPLICATIONS:  IV Contrast: NONE  Oral Contrast: NONE  Complications: None reported at time of study completion    PROCEDURE:  CT of the Chest, Abdomen and Pelvis was performed.  Sagittal and coronal reformats were performed.    FINDINGS:  CHEST:  LUNGS AND LARGE AIRWAYS: Patent central airways. Dependent linear   atelectasis/scarring.  PLEURA: No pleural effusion.  VESSELS: Normal caliber ascending thoracic aorta. Descending thoracic   aorta measures 3.2 cm at the level of the left atrium. Normal caliber   main pulmonary artery.  HEART: Heart size is normal. No pericardial effusion. Severe coronary   artery calcifications and/or stents. Aortic and mitral annular   calcifications as well as calcifications of the aorticvalve leaflets.  MEDIASTINUM AND CHERY: No lymphadenopathy.  CHEST WALL AND LOWER NECK: Within normal limits.    ABDOMEN AND PELVIS:  LIVER: Within normal limits.  BILE DUCTS: Normal caliber.  GALLBLADDER: Contracted  SPLEEN: Within normal limits.  PANCREAS: Within normal limits.  ADRENALS: Within normal limits.  KIDNEYS/URETERS: Left nephrectomy. No right hydronephrosis. Simple and   hemorrhagic right renal cysts.    BLADDER: Within normal limits.  REPRODUCTIVE ORGANS: Mildly enlarged prostate gland.    BOWEL: No bowel obstruction. Appendix is not visualized. Enteric tube is   coiled within the gastric lumen.  PERITONEUM: No ascites.  VESSELS: Status post endovascular repair of the infrarenal abdominal   aorta with right renal artery stent. The native aneurysm sac is enlarged   compared with CT 4/3/2018, measuring 8.0 x 7.1 cm (3-186), previously 5.6   x 4.4 cm at a similar level. Heterogeneous attenuation within the   aneurysm sac, not well assessed without intravenous contrast.   Embolization coils are noted anteriorly and posteriorly. Dilated left   common iliac artery measuring up to 2.2 cm, previously 1.6 cm.  RETROPERITONEUM/LYMPH NODES: No lymphadenopathy.  ABDOMINAL WALL: Within normal limits.  BONES: Degenerative changes.    IMPRESSION:  Endovascular repair of infrarenal abdominal aorta with increase in size   of the native aneurysm sac compared to CT 4/3/2018, measuring up to 8 cm.   Heterogeneous attenuation of the native aneurysm sac is suspicious for   endoleak. However, anterior and posterior embolization coils are also new   compared with the prior CT, possibly representing prior endoleak repair.   Correlate with patient history. Consider CT with intravenous contrast to   further characterization if clinically indicated.    Otherwise no acute pathology in the chest, abdomen, or pelvis.    ALYSSIA PURI DO; Attending Radiologist  This document has been electronically signed. Nov 22 2023  2:23PM    < end of copied text >    < from: US Duplex Arterial Upper Ext Ltd, Left (11.22.23 @ 11:27) >    ACC: [id number] EXAM:  US DPLX UPR EXT ARTS LTD LT   ORDERED BY: TY MORROW     PROCEDURE DATE:  11/22/2023          INTERPRETATION:  History: A prior examination, dated 11/21/2023, showed   left brachial vein DVT and questioned the presence of a left brachial   artery aneurysm.    A left brachial vein does appear to be thrombosed.    There is a normal triphasic pattern of unimpeded arterial flow through   the left subclavian artery, axillary artery and left brachial artery.    The leftradial artery is occluded in the distal forearm.    The left ulnar artery is patent with unimpeded antegrade flow.    On this examination, no pseudoaneurysm or hematoma is identified.    IMPRESSION:    No pseudoaneurysm arising from the left brachialartery is identified.    The left radial artery in the distal forearm is thrombosed.    --- End of Report ---            IKER PICKERING MD; Attending Interventional Radiologist  This document has been electronically signed. Nov 22 2023 12:09PM    < end of copied text >                Impression and Plan: Vascular Attending:        Vascular Surgery HPI:  Admission Hpi below reviewed  multiple comorbidities, singular kidney   past history of AAA, EVAR.. with reported postoperative renal failure resulting in a course of HD  Renal function restored post renal artery stenting by patient's vascular surgeon  (Anastasia)_  Patient currently presenting with unwitnessed fall, resulting in ICH.. Now s/p right Hemicraniectomy  work up performed for leukocytosis/fever; Non contrast CT exhibiting infrarenal endograph, with reported increase in sac size compared to prior studies  Vascular surgery eval requested due to concerns for possible endoleak   Patient's daughter at bedside providing assistance with HPI        HPI:  82y M with PMH HTN, CAD s/p stents, renal cell carcinoma status post left nephrectomy, bladder CA, BPH, CHF, LBBB, vertigo,  HLD, 5.5cm AAA without rupture post EVAR, paroxysmal A-fib on Eliquis, Plavix, and aspirin, early stage Alzheimer dementia transferred from [facility] s/p unwitnessed fall with head strike at home found by wife on floor at 8am. Patient brought to urgent care by wife and had 5 staples to posterior scalp but was instructed to go to nearest ED.  CT head at [place] showed R frontal IPH, SDH, SAH at 9:00. CTA stroke protocol not performed at [facility]. Patient BIB on 6L NC.  Pt GCS 15 on arrival, A&Ox2 on arrival. After initial assessment, patient noted to be vomiting enroute to CT scanner.          (10 Nov 2023 11:04)      PAST MEDICAL & SURGICAL HISTORY:  HTN  dx 2008      Bladder Cancer (ICD9 188.9)  TCC, dx 1999      Hyperlipemia (ICD9 272.4)  dx 2008      Lt Renal Neoplasm  left - s/p left nephrectomy      Hx antineoplastic chemotherapy (BCG 2012)      Left bundle branch block      Vertigo      Heel spur, left      Abdominal aortic aneurysm (AAA)  5.5 cm      BPH (benign prostatic hyperplasia)      Renal mass, right  current - following with Dr Pamela SHEARER (coronary artery disease)      Bladder cancer, primary, with metastasis from bladder to other site  Left kidney      Acute renal failure, unspecified acute renal failure type      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)      Abdominal aortic aneurysm (AAA) without rupture      Congestive heart failure, unspecified chronicity, unspecified heart failure type      Hypertension      History of abdominal aortic aneurysm (AAA)      CAD (coronary artery disease)      Alzheimers disease      urethral Stent 7/2008  stent placement and removal      S/P Cystoscopy  bladder polyps removal multiple times (since 1999), 6/10 , 10/2011 & 10/17/2011, 5/12, 11/2012, last 5/13/13, 12/2013, 7/2014      S/P Tonsillectomy      History of Lt  Nephrectomy  6/10 - left      History of cystoscopy  April 2015      H/O abdominal aortic aneurysm repair      S/P AAA repair      History of nephrectomy  Left      Presence of stent in LAD coronary artery      History of heart artery stent  DIONICIO          REVIEW OF SYSTEMS      General:	    Skin/Breast:  	  Ophthalmologic:  	  ENMT:	    Respiratory and Thorax:  	  Cardiovascular:	    Gastrointestinal:	    Genitourinary:	    Musculoskeletal:	    Neurological:	    Psychiatric:	    Hematology/Lymphatics:	    Endocrine:	    Allergic/Immunologic:	    MEDICATIONS  (STANDING):  acetylcysteine 20%  Inhalation 4 milliLiter(s) Inhalation every 6 hours  amantadine 100 milliGRAM(s) Oral <User Schedule>  apixaban 5 milliGRAM(s) Oral every 12 hours  aspirin  chewable 81 milliGRAM(s) Oral daily  atorvastatin 40 milliGRAM(s) Oral at bedtime  chlorhexidine 2% Cloths 1 Application(s) Topical <User Schedule>  diltiazem    Tablet 60 milliGRAM(s) Oral <User Schedule>  folic acid 1 milliGRAM(s) Oral daily  levalbuterol Inhalation 1.25 milliGRAM(s) Inhalation every 6 hours  melatonin 3 milliGRAM(s) Oral at bedtime  meropenem Injectable 1000 milliGRAM(s) IV Push every 12 hours  meropenem Injectable      metoprolol tartrate 25 milliGRAM(s) Oral <User Schedule>  Nephro-rmoa 1 Tablet(s) Oral daily  polyethylene glycol 3350 17 Gram(s) Oral daily  senna 2 Tablet(s) Oral at bedtime    MEDICATIONS  (PRN):  acetaminophen   Oral Liquid .. 800 milliGRAM(s) Oral every 6 hours PRN Temp greater or equal to 38C (100.4F)  hydrALAZINE Injectable 10 milliGRAM(s) IV Push every 2 hours PRN SBP>160  labetalol Injectable 10 milliGRAM(s) IV Push every 2 hours PRN SBP>160      Allergies    penicillin (Rash)  heparin (Other (Severe))  penicillin (Hives)  Cipro (Other)  Levaquin (Other)  heparin (Other)    Intolerances        SOCIAL HISTORY: non contributory       Vital Signs Last 24 Hrs  T(C): 37.4 (22 Nov 2023 16:00), Max: 37.9 (21 Nov 2023 20:00)  T(F): 99.3 (22 Nov 2023 16:00), Max: 100.2 (21 Nov 2023 20:00)  HR: 74 (22 Nov 2023 16:00) (74 - 114)  BP: 138/80 (22 Nov 2023 16:00) (117/75 - 175/84)  BP(mean): 98 (22 Nov 2023 16:00) (84 - 122)  RR: 15 (22 Nov 2023 16:00) (14 - 21)  SpO2: 100% (22 Nov 2023 16:00) (96% - 100%)    Parameters below as of 22 Nov 2023 16:00    O2 Flow (L/min): 2      PHYSICAL EXAM:      Constitutional: lethargic , right sided head frontal deformity    Eyes: noscleral icterus     ENMT: moist membranes     Neck: supple     Respiratory: non [id number]     Cardiovascular:     Gastrointestinal: soft, non tender, no gross pulsation     Genitourinary: condom urine drainage system in place     Rectal: not examined     Extremities: warm, no calf tenderness elicited     Vascular: equal brachials /ulnar, difficult to palpate the right radial    Neurological: noted left upper/lower side weakness     Skin: scattered ecchymosis       Psychiatric: unable to full assess       Pulses:   Right:                                                                          Left:  FEM [ ]2+ [ ]1+ [ ]doppler                                             FEM [ ]2+ [ ]1+ [ ]doppler    POP [ x]2+ [ ]1+ [ ]doppler                                             POP [x ]2+ [ ]1+ [ ]doppler    DP [ ]2+ [ ]1+ [ ]doppler                                                DP [ ]2+ [ ]1+ [ ]doppler  PT[ ]2+ [ ]1+ [ ]doppler                                                  PT [ ]2+ [ ]1+ [ ]doppler      LABS:                        9.1    18.34 )-----------( 350      ( 22 Nov 2023 04:00 )             26.8     11-22    145  |  109<H>  |  39.8<H>  ----------------------------<  123<H>  4.0   |  23.0  |  1.44<H>    Ca    8.9      22 Nov 2023 04:00  Phos  2.9     11-22  Mg     2.2     11-22        Urinalysis Basic - ( 22 Nov 2023 04:00 )    Color: x / Appearance: x / SG: x / pH: x  Gluc: 123 mg/dL / Ketone: x  / Bili: x / Urobili: x   Blood: x / Protein: x / Nitrite: x   Leuk Esterase: x / RBC: x / WBC x   Sq Epi: x / Non Sq Epi: x / Bacteria: x        RADIOLOGY & ADDITIONAL STUDIES    < from: CT Abdomen and Pelvis No Cont (11.22.23 @ 03:01) >    ACC: [id number] EXAM:  CT ABDOMEN AND PELVIS   ORDERED BY: JOHANNE MCKEE     ACC: [id number] EXAM:  CT CHEST   ORDERED BY: JOHANNE MCKEE     PROCEDURE DATE:  11/22/2023          INTERPRETATION:  CLINICAL INFORMATION: Leukocytosis    COMPARISON: CT 2/25/2018, 4/3/2018    CONTRAST/COMPLICATIONS:  IV Contrast: NONE  Oral Contrast: NONE  Complications: None reported at time of study completion    PROCEDURE:  CT of the Chest, Abdomen and Pelvis was performed.  Sagittal and coronal reformats were performed.    FINDINGS:  CHEST:  LUNGS AND LARGE AIRWAYS: Patent central airways. Dependent linear   atelectasis/scarring.  PLEURA: No pleural effusion.  VESSELS: Normal caliber ascending thoracic aorta. Descending thoracic   aorta measures 3.2 cm at the level of the left atrium. Normal caliber   main pulmonary artery.  HEART: Heart size is normal. No pericardial effusion. Severe coronary   artery calcifications and/or stents. Aortic and mitral annular   calcifications as well as calcifications of the aorticvalve leaflets.  MEDIASTINUM AND CHERY: No lymphadenopathy.  CHEST WALL AND LOWER NECK: Within normal limits.    ABDOMEN AND PELVIS:  LIVER: Within normal limits.  BILE DUCTS: Normal caliber.  GALLBLADDER: Contracted  SPLEEN: Within normal limits.  PANCREAS: Within normal limits.  ADRENALS: Within normal limits.  KIDNEYS/URETERS: Left nephrectomy. No right hydronephrosis. Simple and   hemorrhagic right renal cysts.    BLADDER: Within normal limits.  REPRODUCTIVE ORGANS: Mildly enlarged prostate gland.    BOWEL: No bowel obstruction. Appendix is not visualized. Enteric tube is   coiled within the gastric lumen.  PERITONEUM: No ascites.  VESSELS: Status post endovascular repair of the infrarenal abdominal   aorta with right renal artery stent. The native aneurysm sac is enlarged   compared with CT 4/3/2018, measuring 8.0 x 7.1 cm (3-186), previously 5.6   x 4.4 cm at a similar level. Heterogeneous attenuation within the   aneurysm sac, not well assessed without intravenous contrast.   Embolization coils are noted anteriorly and posteriorly. Dilated left   common iliac artery measuring up to 2.2 cm, previously 1.6 cm.  RETROPERITONEUM/LYMPH NODES: No lymphadenopathy.  ABDOMINAL WALL: Within normal limits.  BONES: Degenerative changes.    IMPRESSION:  Endovascular repair of infrarenal abdominal aorta with increase in size   of the native aneurysm sac compared to CT 4/3/2018, measuring up to 8 cm.   Heterogeneous attenuation of the native aneurysm sac is suspicious for   endoleak. However, anterior and posterior embolization coils are also new   compared with the prior CT, possibly representing prior endoleak repair.   Correlate with patient history. Consider CT with intravenous contrast to   further characterization if clinically indicated.    Otherwise no acute pathology in the chest, abdomen, or pelvis.    ALYSSIA PURI DO; Attending Radiologist  This document has been electronically signed. Nov 22 2023  2:23PM    < end of copied text >    < from: US Duplex Arterial Upper Ext Ltd, Left (11.22.23 @ 11:27) >    ACC: [id number] EXAM:  US DPLX UPR EXT ARTS LTD LT   ORDERED BY: TY MORROW     PROCEDURE DATE:  11/22/2023          INTERPRETATION:  History: A prior examination, dated 11/21/2023, showed   left brachial vein DVT and questioned the presence of a left brachial   artery aneurysm.    A left brachial vein does appear to be thrombosed.    There is a normal triphasic pattern of unimpeded arterial flow through   the left subclavian artery, axillary artery and left brachial artery.    The leftradial artery is occluded in the distal forearm.    The left ulnar artery is patent with unimpeded antegrade flow.    On this examination, no pseudoaneurysm or hematoma is identified.    IMPRESSION:    No pseudoaneurysm arising from the left brachialartery is identified.    The left radial artery in the distal forearm is thrombosed.    --- End of Report ---            IKER PICKERING MD; Attending Interventional Radiologist  This document has been electronically signed. Nov 22 2023 12:09PM    < end of copied text >                Impression and Plan: Vascular Attending:  Michelle BUTTS      Vascular Surgery HPI:  Admission Hpi below reviewed  multiple comorbidities, singular kidney   past history of AAA, EVAR.. with reported postoperative renal failure resulting in a course of HD  Renal function restored post renal artery stenting by patient's vascular surgeon  (Anastasia)_  Patient currently presenting with unwitnessed fall, resulting in ICH.. Now s/p right Hemicraniectomy  work up performed for leukocytosis/fever; Non contrast CT exhibiting infrarenal endograph, with reported increase in sac size compared to prior studies  Vascular surgery eval requested due to concerns for possible endoleak   Patient's daughter at bedside providing assistance with HPI        HPI:  82y M with PMH HTN, CAD s/p stents, renal cell carcinoma status post left nephrectomy, bladder CA, BPH, CHF, LBBB, vertigo,  HLD, 5.5cm AAA without rupture post EVAR, paroxysmal A-fib on Eliquis, Plavix, and aspirin, early stage Alzheimer dementia transferred from [facility] s/p unwitnessed fall with head strike at home found by wife on floor at 8am. Patient brought to urgent care by wife and had 5 staples to posterior scalp but was instructed to go to nearest ED.  CT head at [facility] showed R frontal IPH, SDH, SAH at 9:00. CTA stroke protocol not performed at [facility]. Patient BIB on 6L NC.  Pt GCS 15 on arrival, A&Ox2 on arrival. After initial assessment, patient noted to be vomiting enroute to CT scanner.          (10 Nov 2023 11:04)      PAST MEDICAL & SURGICAL HISTORY:  HTN  dx 2008      Bladder Cancer (ICD9 188.9)  TCC, dx 1999      Hyperlipemia (ICD9 272.4)  dx 2008      Lt Renal Neoplasm  left - s/p left nephrectomy      Hx antineoplastic chemotherapy (BCG 2012)      Left bundle branch block      Vertigo      Heel spur, left      Abdominal aortic aneurysm (AAA)  5.5 cm      BPH (benign prostatic hyperplasia)      Renal mass, right  current - following with Dr Pamela SHEARER (coronary artery disease)      Bladder cancer, primary, with metastasis from bladder to other site  Left kidney      Acute renal failure, unspecified acute renal failure type      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)      Abdominal aortic aneurysm (AAA) without rupture      Congestive heart failure, unspecified chronicity, unspecified heart failure type      Hypertension      History of abdominal aortic aneurysm (AAA)      CAD (coronary artery disease)      Alzheimers disease      urethral Stent 7/2008  stent placement and removal      S/P Cystoscopy  bladder polyps removal multiple times (since 1999), 6/10 , 10/2011 & 10/17/2011, 5/12, 11/2012, last 5/13/13, 12/2013, 7/2014      S/P Tonsillectomy      History of Lt  Nephrectomy  6/10 - left      History of cystoscopy  April 2015      H/O abdominal aortic aneurysm repair      S/P AAA repair      History of nephrectomy  Left      Presence of stent in LAD coronary artery      History of heart artery stent  DIONICIO          REVIEW OF SYSTEMS      General:	    Skin/Breast:  	  Ophthalmologic:  	  ENMT:	    Respiratory and Thorax:  	  Cardiovascular:	    Gastrointestinal:	    Genitourinary:	    Musculoskeletal:	    Neurological:	    Psychiatric:	    Hematology/Lymphatics:	    Endocrine:	    Allergic/Immunologic:	    MEDICATIONS  (STANDING):  acetylcysteine 20%  Inhalation 4 milliLiter(s) Inhalation every 6 hours  amantadine 100 milliGRAM(s) Oral <User Schedule>  apixaban 5 milliGRAM(s) Oral every 12 hours  aspirin  chewable 81 milliGRAM(s) Oral daily  atorvastatin 40 milliGRAM(s) Oral at bedtime  chlorhexidine 2% Cloths 1 Application(s) Topical <User Schedule>  diltiazem    Tablet 60 milliGRAM(s) Oral <User Schedule>  folic acid 1 milliGRAM(s) Oral daily  levalbuterol Inhalation 1.25 milliGRAM(s) Inhalation every 6 hours  melatonin 3 milliGRAM(s) Oral at bedtime  meropenem Injectable 1000 milliGRAM(s) IV Push every 12 hours  meropenem Injectable      metoprolol tartrate 25 milliGRAM(s) Oral <User Schedule>  Nephro-roma 1 Tablet(s) Oral daily  polyethylene glycol 3350 17 Gram(s) Oral daily  senna 2 Tablet(s) Oral at bedtime    MEDICATIONS  (PRN):  acetaminophen   Oral Liquid .. 800 milliGRAM(s) Oral every 6 hours PRN Temp greater or equal to 38C (100.4F)  hydrALAZINE Injectable 10 milliGRAM(s) IV Push every 2 hours PRN SBP>160  labetalol Injectable 10 milliGRAM(s) IV Push every 2 hours PRN SBP>160      Allergies    penicillin (Rash)  heparin (Other (Severe))  penicillin (Hives)  Cipro (Other)  Levaquin (Other)  heparin (Other)    Intolerances        SOCIAL HISTORY: non contributory       Vital Signs Last 24 Hrs  T(C): 37.4 (22 Nov 2023 16:00), Max: 37.9 (21 Nov 2023 20:00)  T(F): 99.3 (22 Nov 2023 16:00), Max: 100.2 (21 Nov 2023 20:00)  HR: 74 (22 Nov 2023 16:00) (74 - 114)  BP: 138/80 (22 Nov 2023 16:00) (117/75 - 175/84)  BP(mean): 98 (22 Nov 2023 16:00) (84 - 122)  RR: 15 (22 Nov 2023 16:00) (14 - 21)  SpO2: 100% (22 Nov 2023 16:00) (96% - 100%)    Parameters below as of 22 Nov 2023 16:00    O2 Flow (L/min): 2      PHYSICAL EXAM:      Constitutional: lethargic , right sided head frontal deformity    Eyes: noscleral icterus     ENMT: moist membranes     Neck: supple     Respiratory: non [id number]     Cardiovascular:     Gastrointestinal: soft, non tender, no gross pulsation     Genitourinary: condom urine drainage system in place     Rectal: not examined     Extremities: warm, no calf tenderness elicited     Vascular: equal brachials /ulnar, difficult to palpate the right radial    Neurological: noted left upper/lower side weakness     Skin: scattered ecchymosis       Psychiatric: unable to full assess       Pulses:   Right:                                                                          Left:  FEM [ ]2+ [ ]1+ [ ]doppler                                             FEM [ ]2+ [ ]1+ [ ]doppler    POP [ x]2+ [ ]1+ [ ]doppler                                             POP [x ]2+ [ ]1+ [ ]doppler    DP [ ]2+ [ ]1+ [ ]doppler                                                DP [ ]2+ [ ]1+ [ ]doppler  PT[ ]2+ [ ]1+ [ ]doppler                                                  PT [ ]2+ [ ]1+ [ ]doppler      LABS:                        9.1    18.34 )-----------( 350      ( 22 Nov 2023 04:00 )             26.8     11-22    145  |  109<H>  |  39.8<H>  ----------------------------<  123<H>  4.0   |  23.0  |  1.44<H>    Ca    8.9      22 Nov 2023 04:00  Phos  2.9     11-22  Mg     2.2     11-22        Urinalysis Basic - ( 22 Nov 2023 04:00 )    Color: x / Appearance: x / SG: x / pH: x  Gluc: 123 mg/dL / Ketone: x  / Bili: x / Urobili: x   Blood: x / Protein: x / Nitrite: x   Leuk Esterase: x / RBC: x / WBC x   Sq Epi: x / Non Sq Epi: x / Bacteria: x        RADIOLOGY & ADDITIONAL STUDIES    < from: CT Abdomen and Pelvis No Cont (11.22.23 @ 03:01) >    ACC: [id number] EXAM:  CT ABDOMEN AND PELVIS   ORDERED BY: JOHANNE MCKEE     ACC: [id number] EXAM:  CT CHEST   ORDERED BY: JOHANNE MCKEE     PROCEDURE DATE:  11/22/2023          INTERPRETATION:  CLINICAL INFORMATION: Leukocytosis    COMPARISON: CT 2/25/2018, 4/3/2018    CONTRAST/COMPLICATIONS:  IV Contrast: NONE  Oral Contrast: NONE  Complications: None reported at time of study completion    PROCEDURE:  CT of the Chest, Abdomen and Pelvis was performed.  Sagittal and coronal reformats were performed.    FINDINGS:  CHEST:  LUNGS AND LARGE AIRWAYS: Patent central airways. Dependent linear   atelectasis/scarring.  PLEURA: No pleural effusion.  VESSELS: Normal caliber ascending thoracic aorta. Descending thoracic   aorta measures 3.2 cm at the level of the left atrium. Normal caliber   main pulmonary artery.  HEART: Heart size is normal. No pericardial effusion. Severe coronary   artery calcifications and/or stents. Aortic and mitral annular   calcifications as well as calcifications of the aorticvalve leaflets.  MEDIASTINUM AND CHERY: No lymphadenopathy.  CHEST WALL AND LOWER NECK: Within normal limits.    ABDOMEN AND PELVIS:  LIVER: Within normal limits.  BILE DUCTS: Normal caliber.  GALLBLADDER: Contracted  SPLEEN: Within normal limits.  PANCREAS: Within normal limits.  ADRENALS: Within normal limits.  KIDNEYS/URETERS: Left nephrectomy. No right hydronephrosis. Simple and   hemorrhagic right renal cysts.    BLADDER: Within normal limits.  REPRODUCTIVE ORGANS: Mildly enlarged prostate gland.    BOWEL: No bowel obstruction. Appendix is not visualized. Enteric tube is   coiled within the gastric lumen.  PERITONEUM: No ascites.  VESSELS: Status post endovascular repair of the infrarenal abdominal   aorta with right renal artery stent. The native aneurysm sac is enlarged   compared with CT 4/3/2018, measuring 8.0 x 7.1 cm (3-186), previously 5.6   x 4.4 cm at a similar level. Heterogeneous attenuation within the   aneurysm sac, not well assessed without intravenous contrast.   Embolization coils are noted anteriorly and posteriorly. Dilated left   common iliac artery measuring up to 2.2 cm, previously 1.6 cm.  RETROPERITONEUM/LYMPH NODES: No lymphadenopathy.  ABDOMINAL WALL: Within normal limits.  BONES: Degenerative changes.    IMPRESSION:  Endovascular repair of infrarenal abdominal aorta with increase in size   of the native aneurysm sac compared to CT 4/3/2018, measuring up to 8 cm.   Heterogeneous attenuation of the native aneurysm sac is suspicious for   endoleak. However, anterior and posterior embolization coils are also new   compared with the prior CT, possibly representing prior endoleak repair.   Correlate with patient history. Consider CT with intravenous contrast to   further characterization if clinically indicated.    Otherwise no acute pathology in the chest, abdomen, or pelvis.    ALYSSIA PURI DO; Attending Radiologist  This document has been electronically signed. Nov 22 2023  2:23PM    < end of copied text >    < from: US Duplex Arterial Upper Ext Ltd, Left (11.22.23 @ 11:27) >    ACC: [id number] EXAM:  US DPLX UPR EXT ARTS LTD LT   ORDERED BY: TY MORROW     PROCEDURE DATE:  11/22/2023          INTERPRETATION:  History: A prior examination, dated 11/21/2023, showed   left brachial vein DVT and questioned the presence of a left brachial   artery aneurysm.    A left brachial vein does appear to be thrombosed.    There is a normal triphasic pattern of unimpeded arterial flow through   the left subclavian artery, axillary artery and left brachial artery.    The leftradial artery is occluded in the distal forearm.    The left ulnar artery is patent with unimpeded antegrade flow.    On this examination, no pseudoaneurysm or hematoma is identified.    IMPRESSION:    No pseudoaneurysm arising from the left brachialartery is identified.    The left radial artery in the distal forearm is thrombosed.    --- End of Report ---            IKER PICKERING MD; Attending Interventional Radiologist  This document has been electronically signed. Nov 22 2023 12:09PM    < end of copied text >                Impression and Plan:    Past history of AAA, EVAR.. with reported postoperative renal failure resulting in a course of HD  Renal function restored post renal artery stenting by patient's vascular surgeon  (Anastasia)_  Patient currently presenting with unwitnessed fall, resulting in ICH.. Now s/p right Hemicraniectomy  work up performed for leukocytosis/fever; Non contrast CT exhibiting infrarenal endograph, with reported increase in sac size compared to prior studies  Vascular surgery eval requested due to concerns for possible endoleak   Upper ext iv access related venous thrombus   Left radial artery occlusion, likely chronic, no noted digital perfusion deficits       -- Non contrast study performed is not an optimal study to determine an endoleak of an EVAR, patient would require a CTA for better assessment, which can place the patient at a high risk for renal failure due to his past history and solitary kidney. Currently patient is asymptomatic and he has been followed closely by his primary Vascular Surgeon Dr Heredia, per his family.  Our assessment is based on two radiographs.. 5 years apart.  It would probably be best at this time to reach out to the patient's vascular surgeon who  provide the proper information in guiding us toward a prudent decision.  - No immediate vascular Surgical intervention at this time  - It would be best to allow the patient to recover from his current condition and follow up with his vascular surgeon post discharge   -Above discussed with the covering vascular attending, Dr. Martinez      Vascular Attending:  Michelle UBTTS      Vascular Surgery HPI:  Admission Hpi below reviewed  multiple comorbidities, singular kidney   past history of AAA, EVAR.. with reported postoperative renal failure resulting in a course of HD  Renal function restored post renal artery stenting by patient's vascular surgeon  (Anastasia)_  Patient currently presenting with unwitnessed fall, resulting in ICH.. Now s/p right Hemicraniectomy  work up performed for leukocytosis/fever; Non contrast CT exhibiting infrarenal endograph, with reported increase in sac size compared to prior studies  Vascular surgery eval requested due to concerns for possible endoleak   Patient's daughter at bedside providing assistance with HPI        HPI:  82y M with PMH HTN, CAD s/p stents, renal cell carcinoma status post left nephrectomy, bladder CA, BPH, CHF, LBBB, vertigo,  HLD, 5.5cm AAA without rupture post EVAR, paroxysmal A-fib on Eliquis, Plavix, and aspirin, early stage Alzheimer dementia transferred from [facility] s/p unwitnessed fall with head strike at home found by wife on floor at 8am. Patient brought to urgent care by wife and had 5 staples to posterior scalp but was instructed to go to nearest ED.  CT head at [facility] showed R frontal IPH, SDH, SAH at 9:00. CTA stroke protocol not performed at [facility]. Patient BIB on 6L NC.  Pt GCS 15 on arrival, A&Ox2 on arrival. After initial assessment, patient noted to be vomiting enroute to CT scanner.          (10 Nov 2023 11:04)      PAST MEDICAL & SURGICAL HISTORY:  HTN  dx 2008      Bladder Cancer (ICD9 188.9)  TCC, dx 1999      Hyperlipemia (ICD9 272.4)  dx 2008      Lt Renal Neoplasm  left - s/p left nephrectomy      Hx antineoplastic chemotherapy (BCG 2012)      Left bundle branch block      Vertigo      Heel spur, left      Abdominal aortic aneurysm (AAA)  5.5 cm      BPH (benign prostatic hyperplasia)      Renal mass, right  current - following with Dr Pamela SHEARER (coronary artery disease)      Bladder cancer, primary, with metastasis from bladder to other site  Left kidney      Acute renal failure, unspecified acute renal failure type      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)      Abdominal aortic aneurysm (AAA) without rupture      Congestive heart failure, unspecified chronicity, unspecified heart failure type      Hypertension      History of abdominal aortic aneurysm (AAA)      CAD (coronary artery disease)      Alzheimers disease      urethral Stent 7/2008  stent placement and removal      S/P Cystoscopy  bladder polyps removal multiple times (since 1999), 6/10 , 10/2011 & 10/17/2011, 5/12, 11/2012, last 5/13/13, 12/2013, 7/2014      S/P Tonsillectomy      History of Lt  Nephrectomy  6/10 - left      History of cystoscopy  April 2015      H/O abdominal aortic aneurysm repair      S/P AAA repair      History of nephrectomy  Left      Presence of stent in LAD coronary artery      History of heart artery stent  DIONICIO          REVIEW OF SYSTEMS      General:	    Skin/Breast:  	  Ophthalmologic:  	  ENMT:	    Respiratory and Thorax:  	  Cardiovascular:	    Gastrointestinal:	    Genitourinary:	    Musculoskeletal:	    Neurological:	    Psychiatric:	    Hematology/Lymphatics:	    Endocrine:	    Allergic/Immunologic:	    MEDICATIONS  (STANDING):  acetylcysteine 20%  Inhalation 4 milliLiter(s) Inhalation every 6 hours  amantadine 100 milliGRAM(s) Oral <User Schedule>  apixaban 5 milliGRAM(s) Oral every 12 hours  aspirin  chewable 81 milliGRAM(s) Oral daily  atorvastatin 40 milliGRAM(s) Oral at bedtime  chlorhexidine 2% Cloths 1 Application(s) Topical <User Schedule>  diltiazem    Tablet 60 milliGRAM(s) Oral <User Schedule>  folic acid 1 milliGRAM(s) Oral daily  levalbuterol Inhalation 1.25 milliGRAM(s) Inhalation every 6 hours  melatonin 3 milliGRAM(s) Oral at bedtime  meropenem Injectable 1000 milliGRAM(s) IV Push every 12 hours  meropenem Injectable      metoprolol tartrate 25 milliGRAM(s) Oral <User Schedule>  Nephro-roma 1 Tablet(s) Oral daily  polyethylene glycol 3350 17 Gram(s) Oral daily  senna 2 Tablet(s) Oral at bedtime    MEDICATIONS  (PRN):  acetaminophen   Oral Liquid .. 800 milliGRAM(s) Oral every 6 hours PRN Temp greater or equal to 38C (100.4F)  hydrALAZINE Injectable 10 milliGRAM(s) IV Push every 2 hours PRN SBP>160  labetalol Injectable 10 milliGRAM(s) IV Push every 2 hours PRN SBP>160      Allergies    penicillin (Rash)  heparin (Other (Severe))  penicillin (Hives)  Cipro (Other)  Levaquin (Other)  heparin (Other)    Intolerances        SOCIAL HISTORY: non contributory       Vital Signs Last 24 Hrs  T(C): 37.4 (22 Nov 2023 16:00), Max: 37.9 (21 Nov 2023 20:00)  T(F): 99.3 (22 Nov 2023 16:00), Max: 100.2 (21 Nov 2023 20:00)  HR: 74 (22 Nov 2023 16:00) (74 - 114)  BP: 138/80 (22 Nov 2023 16:00) (117/75 - 175/84)  BP(mean): 98 (22 Nov 2023 16:00) (84 - 122)  RR: 15 (22 Nov 2023 16:00) (14 - 21)  SpO2: 100% (22 Nov 2023 16:00) (96% - 100%)    Parameters below as of 22 Nov 2023 16:00    O2 Flow (L/min): 2      PHYSICAL EXAM:      Constitutional: lethargic , right sided head frontal deformity    Eyes: noscleral icterus     ENMT: moist membranes     Neck: supple     Respiratory: non [id number]     Cardiovascular:     Gastrointestinal: soft, non tender, no gross pulsation     Genitourinary: condom urine drainage system in place     Rectal: not examined     Extremities: warm, no calf tenderness elicited     Vascular: equal brachials /ulnar, difficult to palpate the right radial    Neurological: noted left upper/lower side weakness     Skin: scattered ecchymosis       Psychiatric: unable to full assess       Pulses:   Right:                                                                          Left:  FEM [ ]2+ [ ]1+ [ ]doppler                                             FEM [ ]2+ [ ]1+ [ ]doppler    POP [ x]2+ [ ]1+ [ ]doppler                                             POP [x ]2+ [ ]1+ [ ]doppler    DP [ ]2+ [ ]1+ [ ]doppler                                                DP [ ]2+ [ ]1+ [ ]doppler  PT[ ]2+ [ ]1+ [ ]doppler                                                  PT [ ]2+ [ ]1+ [ ]doppler      LABS:                        9.1    18.34 )-----------( 350      ( 22 Nov 2023 04:00 )             26.8     11-22    145  |  109<H>  |  39.8<H>  ----------------------------<  123<H>  4.0   |  23.0  |  1.44<H>    Ca    8.9      22 Nov 2023 04:00  Phos  2.9     11-22  Mg     2.2     11-22        Urinalysis Basic - ( 22 Nov 2023 04:00 )    Color: x / Appearance: x / SG: x / pH: x  Gluc: 123 mg/dL / Ketone: x  / Bili: x / Urobili: x   Blood: x / Protein: x / Nitrite: x   Leuk Esterase: x / RBC: x / WBC x   Sq Epi: x / Non Sq Epi: x / Bacteria: x        RADIOLOGY & ADDITIONAL STUDIES    < from: CT Abdomen and Pelvis No Cont (11.22.23 @ 03:01) >    ACC: [id number] EXAM:  CT ABDOMEN AND PELVIS   ORDERED BY: JOHANNE MCKEE     ACC: [id number] EXAM:  CT CHEST   ORDERED BY: JOHANNE MCKEE     PROCEDURE DATE:  11/22/2023          INTERPRETATION:  CLINICAL INFORMATION: Leukocytosis    COMPARISON: CT 2/25/2018, 4/3/2018    CONTRAST/COMPLICATIONS:  IV Contrast: NONE  Oral Contrast: NONE  Complications: None reported at time of study completion    PROCEDURE:  CT of the Chest, Abdomen and Pelvis was performed.  Sagittal and coronal reformats were performed.    FINDINGS:  CHEST:  LUNGS AND LARGE AIRWAYS: Patent central airways. Dependent linear   atelectasis/scarring.  PLEURA: No pleural effusion.  VESSELS: Normal caliber ascending thoracic aorta. Descending thoracic   aorta measures 3.2 cm at the level of the left atrium. Normal caliber   main pulmonary artery.  HEART: Heart size is normal. No pericardial effusion. Severe coronary   artery calcifications and/or stents. Aortic and mitral annular   calcifications as well as calcifications of the aorticvalve leaflets.  MEDIASTINUM AND CHERY: No lymphadenopathy.  CHEST WALL AND LOWER NECK: Within normal limits.    ABDOMEN AND PELVIS:  LIVER: Within normal limits.  BILE DUCTS: Normal caliber.  GALLBLADDER: Contracted  SPLEEN: Within normal limits.  PANCREAS: Within normal limits.  ADRENALS: Within normal limits.  KIDNEYS/URETERS: Left nephrectomy. No right hydronephrosis. Simple and   hemorrhagic right renal cysts.    BLADDER: Within normal limits.  REPRODUCTIVE ORGANS: Mildly enlarged prostate gland.    BOWEL: No bowel obstruction. Appendix is not visualized. Enteric tube is   coiled within the gastric lumen.  PERITONEUM: No ascites.  VESSELS: Status post endovascular repair of the infrarenal abdominal   aorta with right renal artery stent. The native aneurysm sac is enlarged   compared with CT 4/3/2018, measuring 8.0 x 7.1 cm (3-186), previously 5.6   x 4.4 cm at a similar level. Heterogeneous attenuation within the   aneurysm sac, not well assessed without intravenous contrast.   Embolization coils are noted anteriorly and posteriorly. Dilated left   common iliac artery measuring up to 2.2 cm, previously 1.6 cm.  RETROPERITONEUM/LYMPH NODES: No lymphadenopathy.  ABDOMINAL WALL: Within normal limits.  BONES: Degenerative changes.    IMPRESSION:  Endovascular repair of infrarenal abdominal aorta with increase in size   of the native aneurysm sac compared to CT 4/3/2018, measuring up to 8 cm.   Heterogeneous attenuation of the native aneurysm sac is suspicious for   endoleak. However, anterior and posterior embolization coils are also new   compared with the prior CT, possibly representing prior endoleak repair.   Correlate with patient history. Consider CT with intravenous contrast to   further characterization if clinically indicated.    Otherwise no acute pathology in the chest, abdomen, or pelvis.    ALYSSIA PURI DO; Attending Radiologist  This document has been electronically signed. Nov 22 2023  2:23PM    < end of copied text >    < from: US Duplex Arterial Upper Ext Ltd, Left (11.22.23 @ 11:27) >    ACC: [id number] EXAM:  US DPLX UPR EXT ARTS LTD LT   ORDERED BY: TY MORROW     PROCEDURE DATE:  11/22/2023          INTERPRETATION:  History: A prior examination, dated 11/21/2023, showed   left brachial vein DVT and questioned the presence of a left brachial   artery aneurysm.    A left brachial vein does appear to be thrombosed.    There is a normal triphasic pattern of unimpeded arterial flow through   the left subclavian artery, axillary artery and left brachial artery.    The leftradial artery is occluded in the distal forearm.    The left ulnar artery is patent with unimpeded antegrade flow.    On this examination, no pseudoaneurysm or hematoma is identified.    IMPRESSION:    No pseudoaneurysm arising from the left brachialartery is identified.    The left radial artery in the distal forearm is thrombosed.    --- End of Report ---            IKER PICKERING MD; Attending Interventional Radiologist  This document has been electronically signed. Nov 22 2023 12:09PM    < end of copied text >                Impression and Plan:    Past history of AAA, EVAR.. with reported postoperative renal failure resulting in a course of HD  Renal function restored post renal artery stenting by patient's vascular surgeon  (Anastasia)_  Patient currently presenting with unwitnessed fall, resulting in ICH.. Now s/p right Hemicraniectomy  work up performed for leukocytosis/fever; Non contrast CT exhibiting infrarenal endograph, with reported increase in sac size compared to prior studies  Vascular surgery eval requested due to concerns for possible endoleak   Upper ext iv access related venous thrombus   Left radial artery occlusion, likely chronic, no noted digital perfusion deficits       -- Non contrast study performed is not an optimal study to determine an endoleak of an EVAR, patient would require a CTA for better assessment, which can place the patient at a high risk for renal failure due to his past history and solitary kidney. Currently patient is asymptomatic and he has been followed closely by his primary Vascular Surgeon Dr Heredia, per his family.  Our assessment is based on two radiographs.. 5 years apart.  It would probably be best at this time to reach out to the patient's vascular surgeon who  provide the proper information in guiding us toward a prudent decision.  - No immediate vascular Surgical intervention at this time  - It would be best to allow the patient to recover from his current condition and follow up with his vascular surgeon post discharge   -Above discussed with the covering vascular attending, Dr. Martinez      Vascular Attending:  Michelle BUTTS      Vascular Surgery HPI:  Admission Hpi below reviewed  multiple comorbidities, singular kidney   past history of AAA, EVAR.. with reported postoperative renal failure resulting in a course of HD  Renal function restored post renal artery stenting by patient's vascular surgeon  (Anastasia)_  Patient currently presenting with unwitnessed fall, resulting in ICH.. Now s/p right Hemicraniectomy  work up performed for leukocytosis/fever; Non contrast CT exhibiting infrarenal endograph, with reported increase in sac size compared to prior studies  Vascular surgery eval requested due to concerns for possible endoleak   Patient's daughter at bedside providing assistance with HPI        HPI:  82y M with PMH HTN, CAD s/p stents, renal cell carcinoma status post left nephrectomy, bladder CA, BPH, CHF, LBBB, vertigo,  HLD, 5.5cm AAA without rupture post EVAR, paroxysmal A-fib on Eliquis, Plavix, and aspirin, early stage Alzheimer dementia transferred from [facility] s/p unwitnessed fall with head strike at home found by wife on floor at 8am. Patient brought to urgent care by wife and had 5 staples to posterior scalp but was instructed to go to nearest ED.  CT head at [facility] showed R frontal IPH, SDH, SAH at 9:00. CTA stroke protocol not performed at [facility]. Patient BIB on 6L NC.  Pt GCS 15 on arrival, A&Ox2 on arrival. After initial assessment, patient noted to be vomiting enroute to CT scanner.          (10 Nov 2023 11:04)      PAST MEDICAL & SURGICAL HISTORY:  HTN  dx 2008      Bladder Cancer (ICD9 188.9)  TCC, dx 1999      Hyperlipemia (ICD9 272.4)  dx 2008      Lt Renal Neoplasm  left - s/p left nephrectomy      Hx antineoplastic chemotherapy ([facility] 2012)      Left bundle branch block      Vertigo      Heel spur, left      Abdominal aortic aneurysm (AAA)  5.5 cm      BPH (benign prostatic hyperplasia)      Renal mass, right  current - following with Dr Pamela SHEARER (coronary artery disease)      Bladder cancer, primary, with metastasis from bladder to other site  Left kidney      Acute renal failure, unspecified acute renal failure type      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)      Abdominal aortic aneurysm (AAA) without rupture      Congestive heart failure, unspecified chronicity, unspecified heart failure type      Hypertension      History of abdominal aortic aneurysm (AAA)      CAD (coronary artery disease)      Alzheimers disease      urethral Stent 7/2008  stent placement and removal      S/P Cystoscopy  bladder polyps removal multiple times (since 1999), 6/10 , 10/2011 & 10/17/2011, 5/12, 11/2012, last 5/13/13, 12/2013, 7/2014      S/P Tonsillectomy      History of Lt  Nephrectomy  6/10 - left      History of cystoscopy  April 2015      H/O abdominal aortic aneurysm repair      S/P AAA repair      History of nephrectomy  Left      Presence of stent in LAD coronary artery      History of heart artery stent  DIONICIO          REVIEW OF SYSTEMS      General:	    Skin/Breast:  	  Ophthalmologic:  	  ENMT:	    Respiratory and Thorax:  	  Cardiovascular:	    Gastrointestinal:	    Genitourinary:	    Musculoskeletal:	    Neurological:	    Psychiatric:	    Hematology/Lymphatics:	    Endocrine:	    Allergic/Immunologic:	    MEDICATIONS  (STANDING):  acetylcysteine 20%  Inhalation 4 milliLiter(s) Inhalation every 6 hours  amantadine 100 milliGRAM(s) Oral <User Schedule>  apixaban 5 milliGRAM(s) Oral every 12 hours  aspirin  chewable 81 milliGRAM(s) Oral daily  atorvastatin 40 milliGRAM(s) Oral at bedtime  chlorhexidine 2% Cloths 1 Application(s) Topical <User Schedule>  diltiazem    Tablet 60 milliGRAM(s) Oral <User Schedule>  folic acid 1 milliGRAM(s) Oral daily  levalbuterol Inhalation 1.25 milliGRAM(s) Inhalation every 6 hours  melatonin 3 milliGRAM(s) Oral at bedtime  meropenem Injectable 1000 milliGRAM(s) IV Push every 12 hours  meropenem Injectable      metoprolol tartrate 25 milliGRAM(s) Oral <User Schedule>  Nephro-roma 1 Tablet(s) Oral daily  polyethylene glycol 3350 17 Gram(s) Oral daily  senna 2 Tablet(s) Oral at bedtime    MEDICATIONS  (PRN):  acetaminophen   Oral Liquid .. 800 milliGRAM(s) Oral every 6 hours PRN Temp greater or equal to 38C (100.4F)  hydrALAZINE Injectable 10 milliGRAM(s) IV Push every 2 hours PRN SBP>160  labetalol Injectable 10 milliGRAM(s) IV Push every 2 hours PRN SBP>160      Allergies    penicillin (Rash)  heparin (Other (Severe))  penicillin (Hives)  Cipro (Other)  Levaquin (Other)  heparin (Other)    Intolerances        SOCIAL HISTORY: non contributory       Vital Signs Last 24 Hrs  T(C): 37.4 (22 Nov 2023 16:00), Max: 37.9 (21 Nov 2023 20:00)  T(F): 99.3 (22 Nov 2023 16:00), Max: 100.2 (21 Nov 2023 20:00)  HR: 74 (22 Nov 2023 16:00) (74 - 114)  BP: 138/80 (22 Nov 2023 16:00) (117/75 - 175/84)  BP(mean): 98 (22 Nov 2023 16:00) (84 - 122)  RR: 15 (22 Nov 2023 16:00) (14 - 21)  SpO2: 100% (22 Nov 2023 16:00) (96% - 100%)    Parameters below as of 22 Nov 2023 16:00    O2 Flow (L/min): 2      PHYSICAL EXAM:      Constitutional: lethargic , right sided head frontal deformity    Eyes: noscleral icterus     ENMT: moist membranes     Neck: supple     Respiratory: non [id number]     Cardiovascular:     Gastrointestinal: soft, non tender, no gross pulsation     Genitourinary: condom urine drainage system in place     Rectal: not examined     Extremities: warm, no calf tenderness elicited     Vascular: equal brachials /ulnar, difficult to palpate the right radial    Neurological: noted left upper/lower side weakness     Skin: scattered ecchymosis       Psychiatric: unable to full assess       Pulses:   Right:                                                                          Left:  FEM [ ]2+ [ ]1+ [ ]doppler                                             FEM [ ]2+ [ ]1+ [ ]doppler    POP [ x]2+ [ ]1+ [ ]doppler                                             POP [x ]2+ [ ]1+ [ ]doppler    DP [ ]2+ [ ]1+ [ ]doppler                                                DP [ ]2+ [ ]1+ [ ]doppler  PT[ ]2+ [ ]1+ [ ]doppler                                                  PT [ ]2+ [ ]1+ [ ]doppler      LABS:                        9.1    18.34 )-----------( 350      ( 22 Nov 2023 04:00 )             26.8     11-22    145  |  109<H>  |  39.8<H>  ----------------------------<  123<H>  4.0   |  23.0  |  1.44<H>    Ca    8.9      22 Nov 2023 04:00  Phos  2.9     11-22  Mg     2.2     11-22        Urinalysis Basic - ( 22 Nov 2023 04:00 )    Color: x / Appearance: x / SG: x / pH: x  Gluc: 123 mg/dL / Ketone: x  / Bili: x / Urobili: x   Blood: x / Protein: x / Nitrite: x   Leuk Esterase: x / RBC: x / WBC x   Sq Epi: x / Non Sq Epi: x / Bacteria: x        RADIOLOGY & ADDITIONAL STUDIES    < from: CT Abdomen and Pelvis No Cont (11.22.23 @ 03:01) >    ACC: [id number] EXAM:  CT ABDOMEN AND PELVIS   ORDERED BY: JOHANNE MCKEE     ACC: [id number] EXAM:  CT CHEST   ORDERED BY: JOHANNE MCKEE     PROCEDURE DATE:  11/22/2023          INTERPRETATION:  CLINICAL INFORMATION: Leukocytosis    COMPARISON: CT 2/25/2018, 4/3/2018    CONTRAST/COMPLICATIONS:  IV Contrast: NONE  Oral Contrast: NONE  Complications: None reported at time of study completion    PROCEDURE:  CT of the Chest, Abdomen and Pelvis was performed.  Sagittal and coronal reformats were performed.    FINDINGS:  CHEST:  LUNGS AND LARGE AIRWAYS: Patent central airways. Dependent linear   atelectasis/scarring.  PLEURA: No pleural effusion.  VESSELS: Normal caliber ascending thoracic aorta. Descending thoracic   aorta measures 3.2 cm at the level of the left atrium. Normal caliber   main pulmonary artery.  HEART: Heart size is normal. No pericardial effusion. Severe coronary   artery calcifications and/or stents. Aortic and mitral annular   calcifications as well as calcifications of the aorticvalve leaflets.  MEDIASTINUM AND CHERY: No lymphadenopathy.  CHEST WALL AND LOWER NECK: Within normal limits.    ABDOMEN AND PELVIS:  LIVER: Within normal limits.  BILE DUCTS: Normal caliber.  GALLBLADDER: Contracted  SPLEEN: Within normal limits.  PANCREAS: Within normal limits.  ADRENALS: Within normal limits.  KIDNEYS/URETERS: Left nephrectomy. No right hydronephrosis. Simple and   hemorrhagic right renal cysts.    BLADDER: Within normal limits.  REPRODUCTIVE ORGANS: Mildly enlarged prostate gland.    BOWEL: No bowel obstruction. Appendix is not visualized. Enteric tube is   coiled within the gastric lumen.  PERITONEUM: No ascites.  VESSELS: Status post endovascular repair of the infrarenal abdominal   aorta with right renal artery stent. The native aneurysm sac is enlarged   compared with CT 4/3/2018, measuring 8.0 x 7.1 cm (3-186), previously 5.6   x 4.4 cm at a similar level. Heterogeneous attenuation within the   aneurysm sac, not well assessed without intravenous contrast.   Embolization coils are noted anteriorly and posteriorly. Dilated left   common iliac artery measuring up to 2.2 cm, previously 1.6 cm.  RETROPERITONEUM/LYMPH NODES: No lymphadenopathy.  ABDOMINAL WALL: Within normal limits.  BONES: Degenerative changes.    IMPRESSION:  Endovascular repair of infrarenal abdominal aorta with increase in size   of the native aneurysm sac compared to CT 4/3/2018, measuring up to 8 cm.   Heterogeneous attenuation of the native aneurysm sac is suspicious for   endoleak. However, anterior and posterior embolization coils are also new   compared with the prior CT, possibly representing prior endoleak repair.   Correlate with patient history. Consider CT with intravenous contrast to   further characterization if clinically indicated.    Otherwise no acute pathology in the chest, abdomen, or pelvis.    ALYSSIA PURI DO; Attending Radiologist  This document has been electronically signed. Nov 22 2023  2:23PM    < end of copied text >    < from: US Duplex Arterial Upper Ext Ltd, Left (11.22.23 @ 11:27) >    ACC: [id number] EXAM:  US DPLX UPR EXT ARTS LTD LT   ORDERED BY: TY MORROW     PROCEDURE DATE:  11/22/2023          INTERPRETATION:  History: A prior examination, dated 11/21/2023, showed   left brachial vein DVT and questioned the presence of a left brachial   artery aneurysm.    A left brachial vein does appear to be thrombosed.    There is a normal triphasic pattern of unimpeded arterial flow through   the left subclavian artery, axillary artery and left brachial artery.    The leftradial artery is occluded in the distal forearm.    The left ulnar artery is patent with unimpeded antegrade flow.    On this examination, no pseudoaneurysm or hematoma is identified.    IMPRESSION:    No pseudoaneurysm arising from the left brachialartery is identified.    The left radial artery in the distal forearm is thrombosed.    --- End of Report ---            IKER PICKERING MD; Attending Interventional Radiologist  This document has been electronically signed. Nov 22 2023 12:09PM    < end of copied text >                Impression and Plan:    Past history of AAA, EVAR.. with reported postoperative renal failure resulting in a course of HD  Renal function restored post renal artery stenting by patient's vascular surgeon  (Anastasia)_  Patient currently presenting with unwitnessed fall, resulting in ICH.. Now s/p right Hemicraniectomy  work up performed for leukocytosis/fever; Non contrast CT exhibiting infrarenal endograph, with reported increase in sac size compared to prior studies  Vascular surgery eval requested due to concerns for possible endoleak   Upper ext iv access related venous thrombus   Left radial artery occlusion, likely chronic, no noted digital perfusion deficits       -- Non contrast study performed is not an optimal study to determine an endoleak of an EVAR, patient would require a CTA for better assessment, which can place the patient at a high risk for renal failure due to his past history and solitary kidney. Currently patient is asymptomatic and he has been followed closely by his primary Vascular Surgeon Dr Heredia, per his family.  Our assessment is based on two radiographs.. 5 years apart.  It would probably be best at this time to reach out to the patient's vascular surgeon who  provide the proper information in guiding us toward a prudent decision.  - No immediate vascular Surgical intervention at this time  - It would be best to allow the patient to recover from his current condition and follow up with his vascular surgeon post discharge   -Above discussed with the covering vascular attending, Dr. Martinez

## 2023-11-22 NOTE — PROGRESS NOTE ADULT - SUBJECTIVE AND OBJECTIVE BOX
Chief complaint:   Patient is a 83y old  Male who presents with a chief complaint of s/p unwitnessed fall with head strike (22 Nov 2023 00:58)    HPI:  82y M with PMH HTN, CAD s/p stents, renal cell carcinoma status post left nephrectomy, bladder CA, BPH, CHF, LBBB, vertigo,  HLD, 5.5cm AAA without rupture post EVAR, paroxysmal A-fib on Eliquis, Plavix, and aspirin, early stage Alzheimer dementia transferred from [facility] s/p unwitnessed fall with head strike at home found by wife on floor at 8am. Patient brought to urgent care by wife and had 5 staples to posterior scalp but was instructed to go to nearest ED.  CT head at [place] showed R frontal IPH, SDH, SAH at 9:00. CTA stroke protocol not performed at [facility]. Patient BIB on 6L NC.  Pt GCS 15 on arrival, A&Ox2 on arrival. After initial assessment, patient noted to be vomiting enroute to CT scanner.          (10 Nov 2023 11:04)        24hr EVENTS:      ROS: [ ]  Unable to assess due to mental status   All other systems negative    -----------------------------------------------------------------------------------------------------------------------------------------------------------------------------------  ICU Vital Signs Last 24 Hrs  T(C): 37.3 (22 Nov 2023 09:00), Max: 37.9 (21 Nov 2023 20:00)  T(F): 99.1 (22 Nov 2023 09:00), Max: 100.2 (21 Nov 2023 20:00)  HR: 91 (22 Nov 2023 09:00) (78 - 114)  BP: 152/76 (22 Nov 2023 09:00) (121/78 - 175/84)  BP(mean): 95 (22 Nov 2023 09:00) (85 - 122)  ABP: --  ABP(mean): --  RR: 19 (22 Nov 2023 09:00) (14 - 21)  SpO2: 100% (22 Nov 2023 09:00) (96% - 100%)    O2 Parameters below as of 22 Nov 2023 09:00    O2 Flow (L/min): 2          I&O's Summary    21 Nov 2023 07:01  -  22 Nov 2023 07:00  --------------------------------------------------------  IN: 1480 mL / OUT: 850 mL / NET: 630 mL    22 Nov 2023 07:01  -  22 Nov 2023 09:47  --------------------------------------------------------  IN: 20 mL / OUT: 0 mL / NET: 20 mL        MEDICATIONS  (STANDING):  acetylcysteine 20%  Inhalation 4 milliLiter(s) Inhalation every 6 hours  amantadine 100 milliGRAM(s) Oral <User Schedule>  aspirin  chewable 81 milliGRAM(s) Oral daily  atorvastatin 40 milliGRAM(s) Oral at bedtime  chlorhexidine 2% Cloths 1 Application(s) Topical <User Schedule>  diltiazem    Tablet 60 milliGRAM(s) Oral <User Schedule>  folic acid 1 milliGRAM(s) Oral daily  fondaparinux Injectable 1.2 milliGRAM(s) SubCutaneous <User Schedule>  levalbuterol Inhalation 1.25 milliGRAM(s) Inhalation every 6 hours  melatonin 3 milliGRAM(s) Oral at bedtime  meropenem Injectable 1000 milliGRAM(s) IV Push every 12 hours  meropenem Injectable      metoprolol tartrate 25 milliGRAM(s) Oral <User Schedule>  Nephro-roma 1 Tablet(s) Oral daily  polyethylene glycol 3350 17 Gram(s) Oral daily  senna 2 Tablet(s) Oral at bedtime      RESPIRATORY:        IMAGING:   Recent imaging studies were reviewed.    LAB RESULTS:                          9.1    18.34 )-----------( 350      ( 22 Nov 2023 04:00 )             26.8           11-22    145  |  109<H>  |  39.8<H>  ----------------------------<  123<H>  4.0   |  23.0  |  1.44<H>    Ca    8.9      22 Nov 2023 04:00  Phos  2.9     11-22  Mg     2.2     11-22                  -----------------------------------------------------------------------------------------------------------------------------------------------------------------------------------    PHYSICAL EXAM:  General: Calm  HEENT: MMM  Neuro:  -Mental status- No acute distress  -CN- PERRL 3mm, EOMI, tongue midline, face symmetric    CV: RRR  Pulm: clear to auscultation  Abd: Soft, nontender, nondistended  Ext: no noted edema in lower ext  Skin: warm, dry       Chief complaint:   Patient is a 83y old  Male who presents with a chief complaint of s/p unwitnessed fall with head strike (22 Nov 2023 00:58)    HPI:  82y M with PMH HTN, CAD s/p stents, renal cell carcinoma status post left nephrectomy, bladder CA, BPH, CHF, LBBB, vertigo,  HLD, 5.5cm AAA without rupture post EVAR, paroxysmal A-fib on Eliquis, Plavix, and aspirin, early stage Alzheimer dementia transferred from [facility] s/p unwitnessed fall with head strike at home found by wife on floor at 8am. Patient brought to urgent care by wife and had 5 staples to posterior scalp but was instructed to go to nearest ED.  CT head at [place] showed R frontal IPH, SDH, SAH at 9:00. CTA stroke protocol not performed at [facility]. Patient BIB on 6L NC.  Pt GCS 15 on arrival, A&Ox2 on arrival. After initial assessment, patient noted to be vomiting enroute to CT scanner.    (10 Nov 2023 11:04)    24hr EVENTS:      ROS: [ ]  Unable to assess due to mental status   All other systems negative    -----------------------------------------------------------------------------------------------------------------------------------------------------------------------------------  ICU Vital Signs Last 24 Hrs  T(C): 37.3 (22 Nov 2023 09:00), Max: 37.9 (21 Nov 2023 20:00)  T(F): 99.1 (22 Nov 2023 09:00), Max: 100.2 (21 Nov 2023 20:00)  HR: 91 (22 Nov 2023 09:00) (78 - 114)  BP: 152/76 (22 Nov 2023 09:00) (121/78 - 175/84)  BP(mean): 95 (22 Nov 2023 09:00) (85 - 122)  ABP: --  ABP(mean): --  RR: 19 (22 Nov 2023 09:00) (14 - 21)  SpO2: 100% (22 Nov 2023 09:00) (96% - 100%)    O2 Parameters below as of 22 Nov 2023 09:00    O2 Flow (L/min): 2          I&O's Summary    21 Nov 2023 07:01  -  22 Nov 2023 07:00  --------------------------------------------------------  IN: 1480 mL / OUT: 850 mL / NET: 630 mL    22 Nov 2023 07:01  -  22 Nov 2023 09:47  --------------------------------------------------------  IN: 20 mL / OUT: 0 mL / NET: 20 mL        MEDICATIONS  (STANDING):  acetylcysteine 20%  Inhalation 4 milliLiter(s) Inhalation every 6 hours  amantadine 100 milliGRAM(s) Oral <User Schedule>  aspirin  chewable 81 milliGRAM(s) Oral daily  atorvastatin 40 milliGRAM(s) Oral at bedtime  chlorhexidine 2% Cloths 1 Application(s) Topical <User Schedule>  diltiazem    Tablet 60 milliGRAM(s) Oral <User Schedule>  folic acid 1 milliGRAM(s) Oral daily  fondaparinux Injectable 1.2 milliGRAM(s) SubCutaneous <User Schedule>  levalbuterol Inhalation 1.25 milliGRAM(s) Inhalation every 6 hours  melatonin 3 milliGRAM(s) Oral at bedtime  meropenem Injectable 1000 milliGRAM(s) IV Push every 12 hours  meropenem Injectable      metoprolol tartrate 25 milliGRAM(s) Oral <User Schedule>  Nephro-roma 1 Tablet(s) Oral daily  polyethylene glycol 3350 17 Gram(s) Oral daily  senna 2 Tablet(s) Oral at bedtime      IMAGING:   Recent imaging studies were reviewed.    LAB RESULTS:                          9.1    18.34 )-----------( 350      ( 22 Nov 2023 04:00 )             26.8       11-22    145  |  109<H>  |  39.8<H>  ----------------------------<  123<H>  4.0   |  23.0  |  1.44<H>    Ca    8.9      22 Nov 2023 04:00  Phos  2.9     11-22  Mg     2.2     11-22        -----------------------------------------------------------------------------------------------------------------------------------------------------------------------------------    PHYSICAL EXAM:  General: Calm  HEENT: MMM  Neuro:  -Mental status- No acute distress, AOx2, FC   -CN- PERRL 3mm, EOMI, tongue midline, face symmetric  RUE 5  RLE 4  LUE 1  LLE tr WD    CV: RRR  Pulm: clear to auscultation  Abd: Soft, nontender, nondistended  Ext: no noted edema in lower ext  Skin: warm, dry

## 2023-11-23 LAB
ANION GAP SERPL CALC-SCNC: 12 MMOL/L — SIGNIFICANT CHANGE UP (ref 5–17)
ANION GAP SERPL CALC-SCNC: 12 MMOL/L — SIGNIFICANT CHANGE UP (ref 5–17)
BUN SERPL-MCNC: 41.4 MG/DL — HIGH (ref 8–20)
BUN SERPL-MCNC: 41.4 MG/DL — HIGH (ref 8–20)
CALCIUM SERPL-MCNC: 9.2 MG/DL — SIGNIFICANT CHANGE UP (ref 8.4–10.5)
CALCIUM SERPL-MCNC: 9.2 MG/DL — SIGNIFICANT CHANGE UP (ref 8.4–10.5)
CHLORIDE SERPL-SCNC: 108 MMOL/L — SIGNIFICANT CHANGE UP (ref 96–108)
CHLORIDE SERPL-SCNC: 108 MMOL/L — SIGNIFICANT CHANGE UP (ref 96–108)
CO2 SERPL-SCNC: 23 MMOL/L — SIGNIFICANT CHANGE UP (ref 22–29)
CO2 SERPL-SCNC: 23 MMOL/L — SIGNIFICANT CHANGE UP (ref 22–29)
CREAT SERPL-MCNC: 1.5 MG/DL — HIGH (ref 0.5–1.3)
CREAT SERPL-MCNC: 1.5 MG/DL — HIGH (ref 0.5–1.3)
EGFR: 46 ML/MIN/1.73M2 — LOW
EGFR: 46 ML/MIN/1.73M2 — LOW
GLUCOSE SERPL-MCNC: 125 MG/DL — HIGH (ref 70–99)
GLUCOSE SERPL-MCNC: 125 MG/DL — HIGH (ref 70–99)
HCT VFR BLD CALC: 27.5 % — LOW (ref 39–50)
HCT VFR BLD CALC: 27.5 % — LOW (ref 39–50)
HGB BLD-MCNC: 9.1 G/DL — LOW (ref 13–17)
HGB BLD-MCNC: 9.1 G/DL — LOW (ref 13–17)
MAGNESIUM SERPL-MCNC: 2.3 MG/DL — SIGNIFICANT CHANGE UP (ref 1.8–2.6)
MAGNESIUM SERPL-MCNC: 2.3 MG/DL — SIGNIFICANT CHANGE UP (ref 1.8–2.6)
MCHC RBC-ENTMCNC: 31.3 PG — SIGNIFICANT CHANGE UP (ref 27–34)
MCHC RBC-ENTMCNC: 31.3 PG — SIGNIFICANT CHANGE UP (ref 27–34)
MCHC RBC-ENTMCNC: 33.1 GM/DL — SIGNIFICANT CHANGE UP (ref 32–36)
MCHC RBC-ENTMCNC: 33.1 GM/DL — SIGNIFICANT CHANGE UP (ref 32–36)
MCV RBC AUTO: 94.5 FL — SIGNIFICANT CHANGE UP (ref 80–100)
MCV RBC AUTO: 94.5 FL — SIGNIFICANT CHANGE UP (ref 80–100)
PHOSPHATE SERPL-MCNC: 3 MG/DL — SIGNIFICANT CHANGE UP (ref 2.4–4.7)
PHOSPHATE SERPL-MCNC: 3 MG/DL — SIGNIFICANT CHANGE UP (ref 2.4–4.7)
PLATELET # BLD AUTO: 392 K/UL — SIGNIFICANT CHANGE UP (ref 150–400)
PLATELET # BLD AUTO: 392 K/UL — SIGNIFICANT CHANGE UP (ref 150–400)
POTASSIUM SERPL-MCNC: 4 MMOL/L — SIGNIFICANT CHANGE UP (ref 3.5–5.3)
POTASSIUM SERPL-MCNC: 4 MMOL/L — SIGNIFICANT CHANGE UP (ref 3.5–5.3)
POTASSIUM SERPL-SCNC: 4 MMOL/L — SIGNIFICANT CHANGE UP (ref 3.5–5.3)
POTASSIUM SERPL-SCNC: 4 MMOL/L — SIGNIFICANT CHANGE UP (ref 3.5–5.3)
RBC # BLD: 2.91 M/UL — LOW (ref 4.2–5.8)
RBC # BLD: 2.91 M/UL — LOW (ref 4.2–5.8)
RBC # FLD: 12.8 % — SIGNIFICANT CHANGE UP (ref 10.3–14.5)
RBC # FLD: 12.8 % — SIGNIFICANT CHANGE UP (ref 10.3–14.5)
SODIUM SERPL-SCNC: 143 MMOL/L — SIGNIFICANT CHANGE UP (ref 135–145)
SODIUM SERPL-SCNC: 143 MMOL/L — SIGNIFICANT CHANGE UP (ref 135–145)
WBC # BLD: 18.46 K/UL — HIGH (ref 3.8–10.5)
WBC # BLD: 18.46 K/UL — HIGH (ref 3.8–10.5)
WBC # FLD AUTO: 18.46 K/UL — HIGH (ref 3.8–10.5)
WBC # FLD AUTO: 18.46 K/UL — HIGH (ref 3.8–10.5)

## 2023-11-23 PROCEDURE — 70450 CT HEAD/BRAIN W/O DYE: CPT | Mod: 26

## 2023-11-23 PROCEDURE — 99233 SBSQ HOSP IP/OBS HIGH 50: CPT

## 2023-11-23 RX ADMIN — Medication 4 MILLILITER(S): at 08:59

## 2023-11-23 RX ADMIN — MEROPENEM 1000 MILLIGRAM(S): 1 INJECTION INTRAVENOUS at 06:08

## 2023-11-23 RX ADMIN — ATORVASTATIN CALCIUM 40 MILLIGRAM(S): 80 TABLET, FILM COATED ORAL at 22:48

## 2023-11-23 RX ADMIN — APIXABAN 5 MILLIGRAM(S): 2.5 TABLET, FILM COATED ORAL at 11:35

## 2023-11-23 RX ADMIN — LEVALBUTEROL 1.25 MILLIGRAM(S): 1.25 SOLUTION, CONCENTRATE RESPIRATORY (INHALATION) at 15:26

## 2023-11-23 RX ADMIN — CHLORHEXIDINE GLUCONATE 1 APPLICATION(S): 213 SOLUTION TOPICAL at 06:08

## 2023-11-23 RX ADMIN — Medication 25 MILLIGRAM(S): at 11:36

## 2023-11-23 RX ADMIN — Medication 81 MILLIGRAM(S): at 11:35

## 2023-11-23 RX ADMIN — Medication 100 MILLIGRAM(S): at 06:10

## 2023-11-23 RX ADMIN — APIXABAN 5 MILLIGRAM(S): 2.5 TABLET, FILM COATED ORAL at 22:48

## 2023-11-23 RX ADMIN — Medication 1 MILLIGRAM(S): at 11:35

## 2023-11-23 RX ADMIN — Medication 4 MILLILITER(S): at 03:57

## 2023-11-23 RX ADMIN — Medication 60 MILLIGRAM(S): at 06:08

## 2023-11-23 RX ADMIN — LEVALBUTEROL 1.25 MILLIGRAM(S): 1.25 SOLUTION, CONCENTRATE RESPIRATORY (INHALATION) at 08:59

## 2023-11-23 RX ADMIN — Medication 60 MILLIGRAM(S): at 17:56

## 2023-11-23 RX ADMIN — LEVALBUTEROL 1.25 MILLIGRAM(S): 1.25 SOLUTION, CONCENTRATE RESPIRATORY (INHALATION) at 19:45

## 2023-11-23 RX ADMIN — LEVALBUTEROL 1.25 MILLIGRAM(S): 1.25 SOLUTION, CONCENTRATE RESPIRATORY (INHALATION) at 03:56

## 2023-11-23 RX ADMIN — Medication 3 MILLIGRAM(S): at 22:48

## 2023-11-23 RX ADMIN — SENNA PLUS 2 TABLET(S): 8.6 TABLET ORAL at 22:48

## 2023-11-23 RX ADMIN — Medication 1 TABLET(S): at 11:35

## 2023-11-23 RX ADMIN — Medication 25 MILLIGRAM(S): at 00:40

## 2023-11-23 RX ADMIN — MEROPENEM 1000 MILLIGRAM(S): 1 INJECTION INTRAVENOUS at 17:54

## 2023-11-23 NOTE — CHART NOTE - NSCHARTNOTEFT_GEN_A_CORE
Came to fit [employer] with a hard shell helmet. Patient has a helmet beside already that has been custom fit with padding to offset pressure on staples and skull. Fit was good. [facility] [phone]

## 2023-11-23 NOTE — PROGRESS NOTE ADULT - ASSESSMENT
81 yo male POD#13 s/p a right decompressive hemicraniectomy by Dr. Larios    -Neurochecks q2  -MRI Brain w/wo pending   -Pain Control, avoid over sedation  -Goal  to 160  -Flap remains full but soft, timing of cranioplasty TBD  -Monitor for Airway protection  -Keep HOB >30 degrees  -EEG negative. AED ppx d/c'd 7 days post op completed  -Free water flushes 250 mL q6h for hypernatremia, BMP daily  -DVT ppx with SCD and Eliquis 5 BID  -Wean oxygen requirements as tolerated, chest PT.   -Medical management per NSICU team   -Plan of care to be discussed in AM with neurosurgery attending

## 2023-11-23 NOTE — PROGRESS NOTE ADULT - SUBJECTIVE AND OBJECTIVE BOX
Chief complaint:   Patient is a 83y old  Male who presents with a chief complaint of s/p unwitnessed fall with head strike (23 Nov 2023 01:50)    HPI:  82y M with PMH HTN, CAD s/p stents, renal cell carcinoma status post left nephrectomy, bladder CA, BPH, CHF, LBBB, vertigo,  HLD, 5.5cm AAA without rupture post EVAR, paroxysmal A-fib on Eliquis, Plavix, and aspirin, early stage Alzheimer dementia transferred from [facility] s/p unwitnessed fall with head strike at home found by wife on floor at 8am. Patient brought to urgent care by wife and had 5 staples to posterior scalp but was instructed to go to nearest ED.  CT head at [place] showed R frontal IPH, SDH, SAH at 9:00. CTA stroke protocol not performed at [facility]. Patient BIB on 6L NC.  Pt GCS 15 on arrival, A&Ox2 on arrival. After initial assessment, patient noted to be vomiting enroute to CT scanner.          (10 Nov 2023 11:04)        24hr EVENTS:      ROS: [ ]  Unable to assess due to mental status   All other systems negative    -----------------------------------------------------------------------------------------------------------------------------------------------------------------------------------  ICU Vital Signs Last 24 Hrs  T(C): 37.6 (23 Nov 2023 04:00), Max: 37.9 (22 Nov 2023 23:00)  T(F): 99.7 (23 Nov 2023 04:00), Max: 100.2 (22 Nov 2023 23:00)  HR: 93 (23 Nov 2023 07:00) (74 - 107)  BP: 166/58 (23 Nov 2023 06:00) (102/65 - 166/58)  BP(mean): 81 (23 Nov 2023 06:00) (77 - 110)  ABP: --  ABP(mean): --  RR: 18 (23 Nov 2023 07:00) (12 - 24)  SpO2: 96% (23 Nov 2023 07:00) (93% - 100%)    O2 Parameters below as of 23 Nov 2023 04:00  Patient On (Oxygen Delivery Method): nasal cannula  O2 Flow (L/min): 2          I&O's Summary    22 Nov 2023 07:01  -  23 Nov 2023 07:00  --------------------------------------------------------  IN: 1380 mL / OUT: 400 mL / NET: 980 mL        MEDICATIONS  (STANDING):  acetylcysteine 20%  Inhalation 4 milliLiter(s) Inhalation every 6 hours  amantadine 100 milliGRAM(s) Oral <User Schedule>  apixaban 5 milliGRAM(s) Oral every 12 hours  aspirin  chewable 81 milliGRAM(s) Oral daily  atorvastatin 40 milliGRAM(s) Oral at bedtime  chlorhexidine 2% Cloths 1 Application(s) Topical <User Schedule>  diltiazem    Tablet 60 milliGRAM(s) Oral <User Schedule>  folic acid 1 milliGRAM(s) Oral daily  levalbuterol Inhalation 1.25 milliGRAM(s) Inhalation every 6 hours  melatonin 3 milliGRAM(s) Oral at bedtime  meropenem Injectable 1000 milliGRAM(s) IV Push every 12 hours  meropenem Injectable      metoprolol tartrate 25 milliGRAM(s) Oral <User Schedule>  Nephro-roma 1 Tablet(s) Oral daily  polyethylene glycol 3350 17 Gram(s) Oral daily  senna 2 Tablet(s) Oral at bedtime      RESPIRATORY:        IMAGING:   Recent imaging studies were reviewed.    LAB RESULTS:                          9.1    18.46 )-----------( 392      ( 23 Nov 2023 03:35 )             27.5           11-23    143  |  108  |  41.4<H>  ----------------------------<  125<H>  4.0   |  23.0  |  1.50<H>    Ca    9.2      23 Nov 2023 03:35  Phos  3.0     11-23  Mg     2.3     11-23                  -----------------------------------------------------------------------------------------------------------------------------------------------------------------------------------  PHYSICAL EXAM:  General: Calm, laying in bed  HEENT: MMM  Neuro:  -Mental status- No acute distress, AOx3, conversational, following commands  -CN- PERRL 3mm, EOMI, tongue midline, face symmetric  -Motor- full strength in all ext  -Sensation- intact to LT   -Coordination- no dysmetria noted    CV:   Pulm: Clear to auscultation  Abd: Soft, nontender, nondistended  Ext: No edema  Skin: warm, dry Chief complaint:   Patient is a 83y old  Male who presents with a chief complaint of s/p unwitnessed fall with head strike (23 Nov 2023 01:50)    HPI:  82y M with PMH HTN, CAD s/p stents, renal cell carcinoma status post left nephrectomy, bladder CA, BPH, CHF, LBBB, vertigo,  HLD, 5.5cm AAA without rupture post EVAR, paroxysmal A-fib on Eliquis, Plavix, and aspirin, early stage Alzheimer dementia transferred from [facility] s/p unwitnessed fall with head strike at home found by wife on floor at 8am. Patient brought to urgent care by wife and had 5 staples to posterior scalp but was instructed to go to nearest ED.  CT head at [place] showed R frontal IPH, SDH, SAH at 9:00. CTA stroke protocol not performed at [facility]. Patient BIB on 6L NC.  Pt GCS 15 on arrival, A&Ox2 on arrival. After initial assessment, patient noted to be vomiting enroute to CT scanner.    (10 Nov 2023 11:04)    24hr EVENTS:  no acute issues, mild delirium    ROS: no pain  All other systems negative    -----------------------------------------------------------------------------------------------------------------------------------------------------------------------------------  ICU Vital Signs Last 24 Hrs  T(C): 37.6 (23 Nov 2023 04:00), Max: 37.9 (22 Nov 2023 23:00)  T(F): 99.7 (23 Nov 2023 04:00), Max: 100.2 (22 Nov 2023 23:00)  HR: 93 (23 Nov 2023 07:00) (74 - 107)  BP: 166/58 (23 Nov 2023 06:00) (102/65 - 166/58)  BP(mean): 81 (23 Nov 2023 06:00) (77 - 110)  ABP: --  ABP(mean): --  RR: 18 (23 Nov 2023 07:00) (12 - 24)  SpO2: 96% (23 Nov 2023 07:00) (93% - 100%)    O2 Parameters below as of 23 Nov 2023 04:00  Patient On (Oxygen Delivery Method): nasal cannula  O2 Flow (L/min): 2          I&O's Summary    22 Nov 2023 07:01  -  23 Nov 2023 07:00  --------------------------------------------------------  IN: 1380 mL / OUT: 400 mL / NET: 980 mL        MEDICATIONS  (STANDING):  acetylcysteine 20%  Inhalation 4 milliLiter(s) Inhalation every 6 hours  amantadine 100 milliGRAM(s) Oral <User Schedule>  apixaban 5 milliGRAM(s) Oral every 12 hours  aspirin  chewable 81 milliGRAM(s) Oral daily  atorvastatin 40 milliGRAM(s) Oral at bedtime  chlorhexidine 2% Cloths 1 Application(s) Topical <User Schedule>  diltiazem    Tablet 60 milliGRAM(s) Oral <User Schedule>  folic acid 1 milliGRAM(s) Oral daily  levalbuterol Inhalation 1.25 milliGRAM(s) Inhalation every 6 hours  melatonin 3 milliGRAM(s) Oral at bedtime  meropenem Injectable 1000 milliGRAM(s) IV Push every 12 hours  meropenem Injectable      metoprolol tartrate 25 milliGRAM(s) Oral <User Schedule>  Nephro-roma 1 Tablet(s) Oral daily  polyethylene glycol 3350 17 Gram(s) Oral daily  senna 2 Tablet(s) Oral at bedtime      IMAGING:   Recent imaging studies were reviewed.    LAB RESULTS:                          9.1    18.46 )-----------( 392      ( 23 Nov 2023 03:35 )             27.5           11-23    143  |  108  |  41.4<H>  ----------------------------<  125<H>  4.0   |  23.0  |  1.50<H>    Ca    9.2      23 Nov 2023 03:35  Phos  3.0     11-23  Mg     2.3     11-23                  -----------------------------------------------------------------------------------------------------------------------------------------------------------------------------------  PHYSICAL EXAM:  General: Calm, fluctuating drowsiness  HEENT: MMM  Neuro:  -Mental status- No acute distress, AOx2, FC   -CN- PERRL 3mm, EOMI, tongue midline, face symmetric  RUE 5  RLE 4  LUE 1  LLE tr WD    CV: RRR  Pulm: clear to auscultation  Abd: Soft, nontender, nondistended  Ext: no noted edema in lower ext  Skin: warm, dry

## 2023-11-23 NOTE — PROGRESS NOTE ADULT - SUBJECTIVE AND OBJECTIVE BOX
HPI: 82y M with PMH HTN, CAD s/p stents, renal cell carcinoma status post left nephrectomy, bladder CA, BPH, CHF, LBBB, vertigo,  HLD, 5.5cm AAA without rupture post EVAR, paroxysmal A-fib on Eliquis, Plavix, and aspirin, early stage Alzheimer dementia transferred from [facility] s/p unwitnessed fall with head strike at home found by wife on floor at 8am. Patient brought to urgent care by wife and had 5 staples to posterior scalp but was instructed to go to nearest ED.  CT head at [place] showed R frontal IPH, SDH, SAH at 9:00. CTA stroke protocol not performed at [facility]. Patient BIB on 6L NC.  Pt GCS 15 on arrival, A&Ox2 on arrival. After initial assessment, patient noted to be vomiting enroute to CT scanner.     24 hr Events: 83y Male s/p R hemicraniectomy POD#13 seen lying comfortably in bed. Eliquis started yesterday for DVT. Neuro exam remains stable. no complaints at this time     PHYSICAL EXAM:  General: NAD  Head/Incision: c/d/i. Flap soft but full  Neuro:  -Mental status- No acute distress. AOx2 (person/place, states it his march however per family, pt does not know date at baseline), hypophonic but verbalizes  -CN- PERRL 3mm, EOMI, tongue midline, L facial  - following commands  - RUE 5/5, RLE 4/5, LUE 1/5, LLE 1/5    Vital Signs Last 24 Hrs  T(C): 37.8 (23 Nov 2023 00:00), Max: 37.9 (22 Nov 2023 23:00)  T(F): 100 (23 Nov 2023 00:00), Max: 100.2 (22 Nov 2023 23:00)  HR: 98 (23 Nov 2023 01:00) (74 - 107)  BP: 102/65 (23 Nov 2023 01:00) (102/65 - 161/90)  BP(mean): 77 (23 Nov 2023 01:00) (77 - 122)  RR: 18 (23 Nov 2023 01:00) (15 - 22)  SpO2: 99% (23 Nov 2023 01:00) (93% - 100%)    I&O's Summary  21 Nov 2023 07:01  -  22 Nov 2023 07:00  --------------------------------------------------------  IN: 1480 mL / OUT: 850 mL / NET: 630 mL    22 Nov 2023 07:01  -  23 Nov 2023 01:59  --------------------------------------------------------  IN: 860 mL / OUT: 400 mL / NET: 460 mL    LABS:             9.1    18.34 )-----------( 350      ( 22 Nov 2023 04:00 )             26.8     11-22    145  |  109<H>  |  39.8<H>  ----------------------------<  123<H>  4.0   |  23.0  |  1.44<H>    Ca    8.9      22 Nov 2023 04:00  Phos  2.9     11-22  Mg     2.2     11-22

## 2023-11-23 NOTE — PROGRESS NOTE ADULT - ASSESSMENT
Patient is a 84 yo M w/ PMH of CAD s/p stents, HTN, Renal Cell CA s/p L nephrectomy, Bladder CA, BPH, CHF, LBBB, Vertigo, HLD, 5.5cm AAA without rupture post EVAR, paroxysmal A-fib on Eliquis, Plavix, and aspirin, early stage Alzheimer dementia who presented to [facility] s/p fall and found to have  R frontal IPH, SDH, SAH. He was transferred to [facility] Neuro-ICU for further care. NSG was consulted. IZABEL drain was placed. He underwent R hemicraniectomy on 11/10. He had his IZABEL drain removed on 11/11. Keppra was started for seizure prophylaxis. He was noted to have DONNIE and Nephrology was consulted. DONNIE was attributed due to ROXANNE and he was started on IVFs. . TTE was done which showed moderately calcified aortic valve with mobile echodensity. Blood cultures w/ NGTD. He tolerated exubation on 11/13. Course was complicated by AFib w/ RVR and Cardiology was consulted. Cardizem was started. He was to be downgraded to Medicine on 11/14 however downgrade was cancelled due to airway concerns. He was started on IV antibiotics for concerns of Klebsiella trachobronchitis. Repeat CTH were stable and ASA was started on 11/15. Respiratory status worsened and patient was placed on HFNC. Course was complicated by DVT of LUE brachial veins and Eliquis was started. CTH was repeated on Eliquis and remained stable. ID was consulted for leukocytosis and fever and patient's antibiotics were switched to Merrem. Upon infectious work up, incidential finding of infrarenal endograph, with reported increase in sac size compared to prior studies. Vascular was consulted, recommending no intervention and outpatient follow up. Patient now demeed appropriate for downgrade by Neuro-ICU to SDU.    #Traumatic ICH  - S/p hemicraniectomy   - Neurocheck Q2H  - EEG unremarkable  - Completed Keppra for seizure prophylaxis  - NSG following  - MRI brain pending  - Repeat CTHs stable, Stat CTH for any neurological changes    Alzheimer's Dementia  - Continue Namenda    Hx of CAD, PAFib  - Continue BB, Diltiazem, ASA, Statin  - Started on Eliquis  - Now in NSR  - Cardiology recs appreciated    Aortic mobile echodensity  - Blood cultures with NGTD  - Cardiology recs appreciated  - No indication for WHIT at this time.    Acute Respiratory Failure in setting of TBI, Tracheobronchitis  - Resolved  - Monitor respiratory status    Fever, leukocytosis  - ID following  - On Merrem  - Leukocytosis improving  - Monitor WBC, fever curve.    Dysphagia   - SLP following  - Advanced to pureed/mod thick liquids  - Aspiration precautions    H/o Lt RCC s/p nephrectomy  DONNIE - improved  Hypernatremia - resolved  - Baseline Creat ~1   - Nephrology recs appreciated  - Avoid nephrotoxic medications  - Monitor BMP, correct elecolytes as needed    LUE DVT   - On Eliquis    ?Endoleak  - Vascular consult appreciated, no surgical intervention recommended  - Outpt follow up    #Thrombocytopenia  - S/p 1u Plt on 11/14  - Resolved    #VTE Ppx: Eliquis    DISPO: Downgrade to stepdown. Will likely need HOLLY on discharge.     Patient still legally  but  to wife. Has a GF. Patient elected daughters as proxy.     Patient is a 82 yo M w/ PMH of CAD s/p stents, HTN, Renal Cell CA s/p L nephrectomy, Bladder CA, BPH, CHF, LBBB, Vertigo, HLD, 5.5cm AAA without rupture post EVAR, paroxysmal A-fib on Eliquis, Plavix, and aspirin, early stage Alzheimer dementia who presented to [facility] s/p fall and found to have  R frontal IPH, SDH, SAH. He was transferred to [facility] Neuro-ICU for further care. NSG was consulted. IZABEL drain was placed. He underwent R hemicraniectomy on 11/10. He had his IZABEL drain removed on 11/11. Keppra was started for seizure prophylaxis. He was noted to have DONNIE and Nephrology was consulted. DONNIE was attributed due to ROXANNE and he was started on IVFs. . TTE was done which showed moderately calcified aortic valve with mobile echodensity. Blood cultures w/ NGTD. He tolerated exubation on 11/13. Course was complicated by AFib w/ RVR and Cardiology was consulted. Cardizem was started. He was to be downgraded to Medicine on 11/14 however downgrade was cancelled due to airway concerns. He was started on IV antibiotics for concerns of Klebsiella trachobronchitis. Repeat CTH were stable and ASA was started on 11/15. Respiratory status worsened and patient was placed on HFNC. Course was complicated by DVT of LUE brachial veins and Eliquis was started. CTH was repeated on Eliquis and remained stable. ID was consulted for leukocytosis and fever and patient's antibiotics were switched to Merrem. Upon infectious work up, incidential finding of infrarenal endograph, with reported increase in sac size compared to prior studies. Vascular was consulted, recommending no intervention and outpatient follow up. Patient now demeed appropriate for downgrade by Neuro-ICU to SDU.    #Traumatic ICH  - S/p hemicraniectomy   - Neurocheck Q2H  - Completed Keppra for seizure prophylaxis  - NSG following  - EEG on 11/18 with epileptic activity - discussed w/ Neuro-ICU, no clinical signs of seizure, recommending holding AEDs for now.   - MRI brain pending  - Repeat CTHs stable, Stat CTH for any neurological changes    Alzheimer's Dementia  - Continue Namenda    Hx of CAD, PAFib  - Continue BB, Diltiazem, ASA, Statin  - Started on Eliquis  - Now in NSR  - Cardiology recs appreciated    Aortic mobile echodensity  - Blood cultures with NGTD  - Cardiology recs appreciated  - No indication for WHIT at this time.    Acute Respiratory Failure in setting of TBI, Tracheobronchitis  - Resolved  - Monitor respiratory status    Fever, leukocytosis  - ID following  - On Merrem  - Leukocytosis improving  - Monitor WBC, fever curve.    Dysphagia   - SLP following  - Advanced to pureed/mod thick liquids  - Aspiration precautions    H/o Lt RCC s/p nephrectomy  DONNIE - improved  Hypernatremia - resolved  - Baseline Creat ~1   - Nephrology recs appreciated  - Avoid nephrotoxic medications  - Monitor BMP, correct elecolytes as needed    LUE DVT   - On Eliquis    ?Endoleak  - Vascular consult appreciated, no surgical intervention recommended  - Outpt follow up    #Thrombocytopenia  - S/p 1u Plt on 11/14  - Resolved    #VTE Ppx: Eliquis    DISPO: Downgrade to stepdown. Will likely need HOLLY on discharge.     Patient still legally  but  to wife. Has a GF. Patient elected daughters as proxy.     Patient is a 82 yo M w/ PMH of CAD s/p stents, HTN, Renal Cell CA s/p L nephrectomy, Bladder CA, BPH, CHF, LBBB, Vertigo, HLD, 5.5cm AAA without rupture post EVAR, paroxysmal A-fib on Eliquis, Plavix, and aspirin, early stage Alzheimer dementia who presented to [facility] s/p fall and found to have  R frontal IPH, SDH, SAH. He was transferred to [facility] Neuro-ICU for further care. NSG was consulted. IZABEL drain was placed. He underwent R hemicraniectomy on 11/10. He had his IZABEL drain removed on 11/11. Keppra was started for seizure prophylaxis. He was noted to have DONNIE and Nephrology was consulted. DONNIE was attributed due to ROXANNE and he was started on IVFs. . TTE was done which showed moderately calcified aortic valve with mobile echodensity. Blood cultures w/ NGTD. He tolerated extubation on 11/13. Course was complicated by AFib w/ RVR and Cardiology was consulted. Cardizem was started. He was to be downgraded to Medicine on 11/14 however downgrade was cancelled due to airway concerns. He was started on IV antibiotics for concerns of Klebsiella tracheobronchitis Repeat CTH were stable and ASA was started on 11/15. Respiratory status worsened and patient was placed on HFNC. Course was complicated by DVT of LUE brachial veins and Eliquis was started. CTH was repeated on Eliquis and remained stable. ID was consulted for leukocytosis and fever and patient's antibiotics were switched to Merrem. Upon infectious work up, incidental finding of infrarenal endograph, with reported increase in sac size compared to prior studies. Vascular was consulted, recommending no intervention and outpatient follow up. Patient now deemed appropriate for downgrade by Neuro-ICU to SDU.    #Traumatic ICH  - S/p hemicraniectomy   - Neurocheck Q2H  - Completed Keppra for seizure prophylaxis  - NSG following  - EEG on 11/18 with epileptic activity - discussed w/ Neuro-ICU, no clinical signs of seizure, recommending holding AEDs for now.   - MRI brain pending  - Repeat CTHs stable, Stat CTH for any neurological changes    Alzheimer's Dementia  - Continue Namenda    Hx of CAD, PAFib  - Continue BB, Diltiazem, ASA, Statin  - Started on Eliquis  - Now in NSR  - Cardiology recs appreciated    Aortic mobile echodensity  - Blood cultures with NGTD  - Cardiology recs appreciated  - No indication for WHIT at this time.    Acute Respiratory Failure in setting of TBI, Tracheobronchitis  - Resolved  - Monitor respiratory status    Fever, leukocytosis  - ID following  - On Merrem  - Leukocytosis improving  - Monitor WBC, fever curve.    Dysphagia   - SLP following, advanced to pureed/mod thick liquids  - Aspiration precautions  - NGT w/ tube feeds still in place due to poor nutrition with fluctuating lethargy     H/o Lt RCC s/p nephrectomy  DONNIE - improved  Hypernatremia - resolved  - Baseline Creat ~1   - Nephrology recs appreciated  - Avoid nephrotoxic medications  - Monitor BMP, correct elecolytes as needed    LUE DVT   - On Eliquis    ?Endoleak  - Vascular consult appreciated, no surgical intervention recommended  - Outpt follow up    #Thrombocytopenia  - S/p 1u Plt on 11/14  - Resolved    #VTE Ppx: Eliquis    DISPO: Downgrade to stepdown. Will likely need HOLLY on discharge.     Patient still legally  but  to wife. Has a GF. Patient elected daughters as proxy.

## 2023-11-23 NOTE — PROGRESS NOTE ADULT - ASSESSMENT
82y M with TBI - R frontal IPH, SDH, tSAH; s/p R decompressive hemicraniectomy 11/10.  Encephalopathy due to TBI, resp. infection, resp. distress, delirium.  Respiratory distress due to impaired bulbar function with difficulty managing secretions, aspiration PNA.  PMH of CAD s/p stents, CHF; HTN, HLD. HIT, AAA post EVAR. Parox. A.Fib, on Cardizem.   PMH of renal cell carcinoma status post left nephrectomy, bladder CA, BPH  Nonoliguric DONNIE, likely ROXANNE, critical illness; improving.  AV vegetation on TTE - unclear etiology. Several BCx negative.  Hypernatremia.     Plan -  Neuro: EEG with epileptiform discharges 11/18  neurochecks q2h, protected sleep time  amantadine 100mg daily, renally dosed  MRI w/wo for infx work-up  melatonin at night  Mobility: PT/OT    Resp:   monitor resp status,  cont nebs + Mucomyst  aggressive chest PT, NT suctioning     CV:  cardiology consulted  maintain -150, HR<120  metoprolol 25mg BID, cardizem 60 BID  cont ASA 81 daily for CAD    GI:  hold puree diet until more awake  restart TF; ulcer ppx, BM regimen  LBM 11/22    Renal:   monitor e-lytes and UOP  FWF to 250 q6hrs  ext catheter    ID: Klebs tracheobronchitis, 11/21 new leukocytosis >20  ID consulted for leukocytosis, fever  continue meropenem  Trend fever curve and WBC    Heme:  SCDs, transition to full AC subcut argatroban, renal dose   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  Encephalopathy due to TBI, resp. infection, resp. distress, delirium.  Respiratory distress due to impaired bulbar function with difficulty managing secretions, aspiration PNA.  PMH of CAD s/p stents, CHF; HTN, HLD. HIT, AAA post EVAR. Parox. A.Fib, on Cardizem.   PMH of renal cell carcinoma status post left nephrectomy, bladder CA, BPH  Nonoliguric DONNIE, likely ROXANNE, critical illness; improving.  AV vegetation on TTE - unclear etiology. Several BCx negative.  Hypernatremia.     Plan -  Neuro: EEG with epileptiform discharges 11/18  neurochecks q2h, protected sleep time  amantadine 100mg daily, renally dosed  no indication for MRI w/wo for infx work-up at this time, afebrile, improving clinically  melatonin at night  Mobility: PT/OT    Resp:   monitor resp status, room air  aggressive chest PT, NT suctioning     CV:  cardiology consulted  maintain -150, HR<120  metoprolol 25mg BID, cardizem 60 BID  cont ASA 81 daily for CAD, statin  no indication for WHIT per cardiology    GI:  hold puree diet until more awake  TF; ulcer ppx, BM regimen  LBM 11/22    Renal:   monitor e-lytes and UOP  FWF to 250 q6hrs  ext catheter    ID: Klebs tracheobronchitis, 11/21 new leukocytosis >20  ID consulted  continue meropenem  Trend fever curve and WBC    Heme:  SCDs,  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  Encephalopathy due to TBI, resp. infection, resp. distress, delirium.  Respiratory distress due to impaired bulbar function with difficulty managing secretions, aspiration PNA.  PMH of CAD s/p stents, CHF; HTN, HLD. HIT, AAA post EVAR. Parox. A.Fib, on Cardizem.   PMH of renal cell carcinoma status post left nephrectomy, bladder CA, BPH  Nonoliguric DONNIE, likely ROXANNE, critical illness; improving.  AV vegetation on TTE - unclear etiology. Several BCx negative.  Hypernatremia.     Plan -  Neuro: EEG with epileptiform discharges 11/18  neurochecks q2h, protected sleep time  amantadine 100mg daily, renally dosed  no indication for MRI w/wo for infx work-up at this time, afebrile, improving clinically  melatonin at night  Mobility: PT/OT    Resp:   monitor resp status, room air  aggressive chest PT, NT suctioning     CV:  cardiology consulted  maintain -150, HR<120  metoprolol 25mg BID, cardizem 60 BID  cont ASA 81 daily for CAD, statin  no indication for WHIT per cardiology    GI:  hold puree diet until more awake  TF; ulcer ppx, BM regimen  LBM 11/22    Renal:   monitor e-lytes and UOP  FWF to 250 q6hrs  ext catheter    ID: Klebs tracheobronchitis, 11/21 new leukocytosis >20  ID consulted  continue meropenem  Trend fever curve and WBC    Heme:  SCDs,  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

## 2023-11-23 NOTE — PROGRESS NOTE ADULT - SUBJECTIVE AND OBJECTIVE BOX
Neuro-ICU to SDU Transfer Acceptance Note  Hospital Course:  Patient is a 82 yo M w/ PMH of CAD s/p stents, HTN, Renal Cell CA s/p L nephrectomy, Bladder CA, BPH, CHF, LBBB, Vertigo, HLD, 5.5cm AAA without rupture post EVAR, paroxysmal A-fib on Eliquis, Plavix, and aspirin, early stage Alzheimer dementia who presented to [facility] s/p fall and found to have  R frontal IPH, SDH, SAH. He was transferred to [facility] Neuro-ICU for further care. NSG was consulted. IZABEL drain was placed. He underwent R hemicraniectomy on 11/10. He had his IZABEL drain removed on 11/11. Keppra was started for seizure prophylaxis. He was noted to have DONNIE and Nephrology was consulted. DONNIE was attributed due to ROXANNE and he was started on IVFs. . TTE was done which showed moderately calcified aortic valve with mobile echodensity. Blood cultures w/ NGTD. He tolerated exubation on 11/13. Course was complicated by AFib w/ RVR and Cardiology was consulted. Cardizem was started. He was to be downgraded to Medicine on 11/14 however downgrade was cancelled due to airway concerns. He was started on IV antibiotics for concerns of Klebsiella trachobronchitis. Repeat CTH were stable and ASA was started on 11/15. Respiratory status worsened and patient was placed on HFNC. Course was complicated by DVT of LUE brachial veins and Eliquis was started. CTH was repeated on Eliquis and remained stable. ID was consulted for leukocytosis and fever and patient's antibiotics were switched to Merrem. Upon infectious work up, incidential finding of infrarenal endograph, with reported increase in sac size compared to prior studies. Vascular was consulted, recommending no intervention and outpatient follow up. Patient now demeed appropriate for downgrade by Neuro-ICU to SDU.    [facility] Division of Hospital Medicine  Jc Cedillo MD    Chief Complaint:  Patient is a 83y old  Male who presents with a chief complaint of s/p unwitnessed fall with head strike (23 Nov 2023 08:50)      SUBJECTIVE / OVERNIGHT EVENTS:  Patient seen and examined at bedside. No acute events reported overnight. No new complaints.    MEDICATIONS  (STANDING):  amantadine 100 milliGRAM(s) Oral <User Schedule>  apixaban 5 milliGRAM(s) Oral every 12 hours  aspirin  chewable 81 milliGRAM(s) Oral daily  atorvastatin 40 milliGRAM(s) Oral at bedtime  chlorhexidine 2% Cloths 1 Application(s) Topical <User Schedule>  diltiazem    Tablet 60 milliGRAM(s) Oral <User Schedule>  folic acid 1 milliGRAM(s) Oral daily  levalbuterol Inhalation 1.25 milliGRAM(s) Inhalation every 6 hours  melatonin 3 milliGRAM(s) Oral at bedtime  meropenem Injectable 1000 milliGRAM(s) IV Push every 12 hours  meropenem Injectable      metoprolol tartrate 25 milliGRAM(s) Oral <User Schedule>  Nephro-roma 1 Tablet(s) Oral daily  polyethylene glycol 3350 17 Gram(s) Oral daily  senna 2 Tablet(s) Oral at bedtime    MEDICATIONS  (PRN):  acetaminophen   Oral Liquid .. 800 milliGRAM(s) Oral every 6 hours PRN Temp greater or equal to 38C (100.4F)  hydrALAZINE Injectable 10 milliGRAM(s) IV Push every 2 hours PRN SBP>160  labetalol Injectable 10 milliGRAM(s) IV Push every 2 hours PRN SBP>160        I&O's Summary    22 Nov 2023 07:01  -  23 Nov 2023 07:00  --------------------------------------------------------  IN: 1380 mL / OUT: 400 mL / NET: 980 mL    23 Nov 2023 07:01  -  23 Nov 2023 15:01  --------------------------------------------------------  IN: 160 mL / OUT: 0 mL / NET: 160 mL        PHYSICAL EXAM:  Vital Signs Last 24 Hrs  T(C): 36.8 (23 Nov 2023 11:58), Max: 37.9 (22 Nov 2023 23:00)  T(F): 98.2 (23 Nov 2023 11:58), Max: 100.2 (22 Nov 2023 23:00)  HR: 100 (23 Nov 2023 11:00) (74 - 107)  BP: 159/69 (23 Nov 2023 11:00) (102/65 - 166/58)  BP(mean): 92 (23 Nov 2023 11:00) (70 - 110)  RR: 18 (23 Nov 2023 11:00) (12 - 24)  SpO2: 96% (23 Nov 2023 11:00) (93% - 100%)    Parameters below as of 23 Nov 2023 09:01  Patient On (Oxygen Delivery Method): room air        CONSTITUTIONAL: NAD  HEENT: NC/AT, PERRL, no JVD  RESPIRATORY: CTA bilaterally, normal effort  CARDIOVASCULAR: RRR, S1/S2+, no m/g/r  ABDOMEN: Nontender to palpation, normoactive bowel sounds, no rebound/guarding  MUSCULOSKELETAL: No edema, cyanosis or deformities.  PSYCH: Calm, affect appropriate.  NEUROLOGY: Awake, alert, no focal neurological deficits.   SKIN: No rashes; no palpable lesions  VASC: Distal pulses palpable    LABS:                        9.1    18.46 )-----------( 392      ( 23 Nov 2023 03:35 )             27.5     11-23    143  |  108  |  41.4<H>  ----------------------------<  125<H>  4.0   |  23.0  |  1.50<H>    Ca    9.2      23 Nov 2023 03:35  Phos  3.0     11-23  Mg     2.3     11-23            Urinalysis Basic - ( 23 Nov 2023 03:35 )    Color: x / Appearance: x / SG: x / pH: x  Gluc: 125 mg/dL / Ketone: x  / Bili: x / Urobili: x   Blood: x / Protein: x / Nitrite: x   Leuk Esterase: x / RBC: x / WBC x   Sq Epi: x / Non Sq Epi: x / Bacteria: x        Culture - Sputum (collected 22 Nov 2023 00:08)  Source: ET Tube ET Tube  Gram Stain (22 Nov 2023 12:49):    Rare polymorphonuclear leukocytes per low power field    Moderate Yeast per oil power field    Culture - Blood (collected 22 Nov 2023 00:08)  Source: .Blood Blood  Preliminary Report (23 Nov 2023 07:02):    No growth at 24 hours    Culture - Blood (collected 21 Nov 2023 23:45)  Source: .Blood Blood  Preliminary Report (23 Nov 2023 07:02):    No growth at 24 hours      CAPILLARY BLOOD GLUCOSE            RADIOLOGY & ADDITIONAL TESTS:  Results Reviewed:   Imaging Personally Reviewed:  Electrocardiogram Personally Reviewed:                                           Neuro-ICU to SDU Transfer Acceptance Note  Hospital Course:  Patient is a 84 yo M w/ PMH of CAD s/p stents, HTN, Renal Cell CA s/p L nephrectomy, Bladder CA, BPH, CHF, LBBB, Vertigo, HLD, 5.5cm AAA without rupture post EVAR, paroxysmal A-fib on Eliquis, Plavix, and aspirin, early stage Alzheimer dementia who presented to [facility] s/p fall and found to have  R frontal IPH, SDH, SAH. He was transferred to [facility] Neuro-ICU for further care. NSG was consulted. IZABEL drain was placed. He underwent R hemicraniectomy on 11/10. He had his IZABEL drain removed on 11/11. Keppra was started for seizure prophylaxis. He was noted to have DONNIE and Nephrology was consulted. DONNIE was attributed due to ROXANNE and he was started on IVFs. . TTE was done which showed moderately calcified aortic valve with mobile echodensity. Blood cultures w/ NGTD. He tolerated extubation on 11/13. Course was complicated by AFib w/ RVR and Cardiology was consulted. Cardizem was started. He was to be downgraded to Medicine on 11/14 however downgrade was cancelled due to airway concerns. He was started on IV antibiotics for concerns of Klebsiella tracheobronchitis Repeat CTH were stable and ASA was started on 11/15. Respiratory status worsened and patient was placed on HFNC. Course was complicated by DVT of LUE brachial veins and Eliquis was started. CTH was repeated on Eliquis and remained stable. ID was consulted for leukocytosis and fever and patient's antibiotics were switched to Merrem. Upon infectious work up, incidental finding of infrarenal endograph, with reported increase in sac size compared to prior studies. Vascular was consulted, recommending no intervention and outpatient follow up. Patient now deemed appropriate for downgrade by Neuro-ICU to SDU.    [facility] Division of Hospital Medicine  Jc Cedillo MD    Chief Complaint:  Patient is a 83y old  Male who presents with a chief complaint of s/p unwitnessed fall with head strike (23 Nov 2023 08:50)      SUBJECTIVE / OVERNIGHT EVENTS:  Patient seen and examined at bedside.     MEDICATIONS  (STANDING):  amantadine 100 milliGRAM(s) Oral <User Schedule>  apixaban 5 milliGRAM(s) Oral every 12 hours  aspirin  chewable 81 milliGRAM(s) Oral daily  atorvastatin 40 milliGRAM(s) Oral at bedtime  chlorhexidine 2% Cloths 1 Application(s) Topical <User Schedule>  diltiazem    Tablet 60 milliGRAM(s) Oral <User Schedule>  folic acid 1 milliGRAM(s) Oral daily  levalbuterol Inhalation 1.25 milliGRAM(s) Inhalation every 6 hours  melatonin 3 milliGRAM(s) Oral at bedtime  meropenem Injectable 1000 milliGRAM(s) IV Push every 12 hours  meropenem Injectable      metoprolol tartrate 25 milliGRAM(s) Oral <User Schedule>  Nephro-roma 1 Tablet(s) Oral daily  polyethylene glycol 3350 17 Gram(s) Oral daily  senna 2 Tablet(s) Oral at bedtime    MEDICATIONS  (PRN):  acetaminophen   Oral Liquid .. 800 milliGRAM(s) Oral every 6 hours PRN Temp greater or equal to 38C (100.4F)  hydrALAZINE Injectable 10 milliGRAM(s) IV Push every 2 hours PRN SBP>160  labetalol Injectable 10 milliGRAM(s) IV Push every 2 hours PRN SBP>160        I&O's Summary    22 Nov 2023 07:01  -  23 Nov 2023 07:00  --------------------------------------------------------  IN: 1380 mL / OUT: 400 mL / NET: 980 mL    23 Nov 2023 07:01  -  23 Nov 2023 15:01  --------------------------------------------------------  IN: 160 mL / OUT: 0 mL / NET: 160 mL        PHYSICAL EXAM:  Vital Signs Last 24 Hrs  T(C): 36.8 (23 Nov 2023 11:58), Max: 37.9 (22 Nov 2023 23:00)  T(F): 98.2 (23 Nov 2023 11:58), Max: 100.2 (22 Nov 2023 23:00)  HR: 100 (23 Nov 2023 11:00) (74 - 107)  BP: 159/69 (23 Nov 2023 11:00) (102/65 - 166/58)  BP(mean): 92 (23 Nov 2023 11:00) (70 - 110)  RR: 18 (23 Nov 2023 11:00) (12 - 24)  SpO2: 96% (23 Nov 2023 11:00) (93% - 100%)    Parameters below as of 23 Nov 2023 09:01  Patient On (Oxygen Delivery Method): room air        CONSTITUTIONAL: NAD, lethargic but opens eyes to verbal stimuli.   HEENT: +NGT  RESPIRATORY: CTA bilaterally, normal effort  CARDIOVASCULAR: RRR, S1/S2+, no m/g/r  ABDOMEN: Nontender to palpation, normoactive bowel sounds, no rebound/guarding  MUSCULOSKELETAL: No edema, cyanosis or deformities.  PSYCH: Calm, affect appropriate.  NEUROLOGY:  A&Ox2 (self, place), follows commands. RUE 5/5, RLE 4/5, LUE and LLE 1/15  SKIN: No rashes; no palpable lesions  VASC: Distal pulses palpable    LABS:                        9.1    18.46 )-----------( 392      ( 23 Nov 2023 03:35 )             27.5     11-23    143  |  108  |  41.4<H>  ----------------------------<  125<H>  4.0   |  23.0  |  1.50<H>    Ca    9.2      23 Nov 2023 03:35  Phos  3.0     11-23  Mg     2.3     11-23            Urinalysis Basic - ( 23 Nov 2023 03:35 )    Color: x / Appearance: x / SG: x / pH: x  Gluc: 125 mg/dL / Ketone: x  / Bili: x / Urobili: x   Blood: x / Protein: x / Nitrite: x   Leuk Esterase: x / RBC: x / WBC x   Sq Epi: x / Non Sq Epi: x / Bacteria: x        Culture - Sputum (collected 22 Nov 2023 00:08)  Source: ET Tube ET Tube  Gram Stain (22 Nov 2023 12:49):    Rare polymorphonuclear leukocytes per low power field    Moderate Yeast per oil power field    Culture - Blood (collected 22 Nov 2023 00:08)  Source: .Blood Blood  Preliminary Report (23 Nov 2023 07:02):    No growth at 24 hours    Culture - Blood (collected 21 Nov 2023 23:45)  Source: .Blood Blood  Preliminary Report (23 Nov 2023 07:02):    No growth at 24 hours      CAPILLARY BLOOD GLUCOSE            RADIOLOGY & ADDITIONAL TESTS:  Results Reviewed:   Imaging Personally Reviewed:  Electrocardiogram Personally Reviewed:

## 2023-11-23 NOTE — CHART NOTE - NSCHARTNOTEFT_GEN_A_CORE
HPI:  82y M with PMH HTN, CAD s/p stents, renal cell carcinoma status post left nephrectomy, bladder CA, BPH, CHF, LBBB, vertigo,  HLD, 5.5cm AAA without rupture post EVAR, paroxysmal A-fib on Eliquis, Plavix, and aspirin, early stage Alzheimer dementia transferred from [facility] s/p unwitnessed fall with head strike at home found by wife on floor at 8am. Patient brought to urgent care by wife and had 5 staples to posterior scalp but was instructed to go to nearest ED.  CT head at [place] showed R frontal IPH, SDH, SAH at 9:00. CTA stroke protocol not performed at [facility]. Patient BIB on 6L NC.  Pt GCS 15 on arrival, A&Ox2 on arrival. After initial assessment, patient noted to be vomiting enroute to CT scanner.         INTERVAL HPI/OVERNIGHT EVENTS:  11/10: R hemicraniectomy   11/11: IZABEL removed.   11/13: extubated, toleratig well.  11/15: started on aspirin.   11/21:  NEw DVT LUE brachail veins, RUE superficial thrombosis cephalic vein. transitioned to Meropenem (ID FOLLOWING)   11/22: eliquis started for DVT> Vascular consulted fo aortic aneurysm findings on CT   11/23: CTH completed, preliminary stable. Downgraded to stepdown under neurosurgery.     Vital Signs Last 24 Hrs  T(C): 37.6 (23 Nov 2023 04:00), Max: 37.9 (22 Nov 2023 23:00)  T(F): 99.7 (23 Nov 2023 04:00), Max: 100.2 (22 Nov 2023 23:00)  HR: 93 (23 Nov 2023 07:00) (74 - 107)  BP: 166/58 (23 Nov 2023 06:00) (102/65 - 166/58)  BP(mean): 81 (23 Nov 2023 06:00) (77 - 110)  RR: 18 (23 Nov 2023 07:00) (12 - 24)  SpO2: 96% (23 Nov 2023 07:00) (93% - 100%)    Parameters below as of 23 Nov 2023 04:00  Patient On (Oxygen Delivery Method): nasal cannula  O2 Flow (L/min): 2        PHYSICAL EXAM:  GENERAL: NAD, well-groomed, well-developed  HEAD:  Atraumatic, normocephalic, Flap soft.   WOUND: Dressing clean dry intact  MENTAL STATUS: AAO x2 to name and place (gives thumbs up); Awake ; Opens eyes spontaneously/to voice/to light touch/to noxious stimuli; Appropriately conversant without aphasia/Nonverbal; following simple commands/mimicking/not following commands  CRANIAL NERVES: PERRL; EOMI; corneals intact b/l; Dolls sign positive; blinks to threat b/l; no facial asymmetry; facial sensation grossly intact to light touch b/l;  tongue midline; palate rises symmetrically; gag reflex intact  MOTOR: strength 5/5 B/L upper and lower extremities; sensation grossly intact all extremities; DTRs 2+ intact and symmetric; negative Jason's b/l; negative clonus b/l  CHEST/LUNG: Clear to auscultation bilaterally; no rales, rhonchi, wheezing, or rubs  HEART: +S1/+S2; Regular rate and rhythm; no murmurs, rubs, or gallops  ABDOMEN: Soft, nontender, nondistended; bowel sounds present all four quadrants  EXTREMITIES:  2+ peripheral pulses, no clubbing, cyanosis, or edema  SKIN: Warm, dry; no rashes or lesions      LABS:                        9.1    18.46 )-----------( 392      ( 23 Nov 2023 03:35 )             27.5     11-23    143  |  108  |  41.4<H>  ----------------------------<  125<H>  4.0   |  23.0  |  1.50<H>    Ca    9.2      23 Nov 2023 03:35  Phos  3.0     11-23  Mg     2.3     11-23        Urinalysis Basic - ( 23 Nov 2023 03:35 )    Color: x / Appearance: x / SG: x / pH: x  Gluc: 125 mg/dL / Ketone: x  / Bili: x / Urobili: x   Blood: x / Protein: x / Nitrite: x   Leuk Esterase: x / RBC: x / WBC x   Sq Epi: x / Non Sq Epi: x / Bacteria: x        11-22 @ 07:01  -  11-23 @ 07:00  --------------------------------------------------------  IN: 1380 mL / OUT: 400 mL / NET: 980 mL        RADIOLOGY & ADDITIONAL TESTS: HPI:  82y M with PMH HTN, CAD s/p stents, renal cell carcinoma status post left nephrectomy, bladder CA, BPH, CHF, LBBB, vertigo,  HLD, 5.5cm AAA without rupture post EVAR, paroxysmal A-fib on Eliquis, Plavix, and aspirin, early stage Alzheimer dementia transferred from [facility] s/p unwitnessed fall with head strike at home found by wife on floor at 8am. Patient brought to urgent care by wife and had 5 staples to posterior scalp but was instructed to go to nearest ED.  CT head at [place] showed R frontal IPH, SDH, SAH at 9:00. CTA stroke protocol not performed at [facility]. Patient BIB on 6L NC.  Pt GCS 15 on arrival, A&Ox2 on arrival. After initial assessment, patient noted to be vomiting enroute to CT scanner.         INTERVAL HPI/OVERNIGHT EVENTS:  11/10: R hemicraniectomy   11/11: IZABEL removed.   11/13: extubated, toleratig well.  11/15: started on aspirin.   11/21:  NEw DVT LUE brachail veins, RUE superficial thrombosis cephalic vein. transitioned to Meropenem (ID FOLLOWING)   11/22: eliquis started for DVT> Vascular consulted fo aortic aneurysm findings on CT chest.   11/23: CTH completed, preliminary stable. Downgraded to stepdown under neurosurgery.     Vital Signs Last 24 Hrs  T(C): 37.6 (23 Nov 2023 04:00), Max: 37.9 (22 Nov 2023 23:00)  T(F): 99.7 (23 Nov 2023 04:00), Max: 100.2 (22 Nov 2023 23:00)  HR: 93 (23 Nov 2023 07:00) (74 - 107)  BP: 166/58 (23 Nov 2023 06:00) (102/65 - 166/58)  BP(mean): 81 (23 Nov 2023 06:00) (77 - 110)  RR: 18 (23 Nov 2023 07:00) (12 - 24)  SpO2: 96% (23 Nov 2023 07:00) (93% - 100%)    Parameters below as of 23 Nov 2023 04:00  Patient On (Oxygen Delivery Method): nasal cannula  O2 Flow (L/min): 2        PHYSICAL EXAM:  GENERAL: NAD, well-groomed, well-developed  HEAD:  Atraumatic, normocephalic, Flap soft.   WOUND: Dressing clean dry intact  MENTAL STATUS: AAO x2 to name and place (gives thumbs up); Awake ; Opens eyes spontaneously; following simple commands On R side.   CRANIAL NERVES: PERRL; EOMI; corneals intact b/l;  blinks to threat b/l; no facial asymmetry; facial sensation grossly intact to light touch b/l;  tongue midline; palate rises symmetrically; gag reflex intact  MOTOR: RUE 5/5, RLE 4/5. LUE 1/5, LLE 1/5.  CHEST/LUNG: Normal chest rise and expansion   HEART: +S1/+S2  SKIN: Warm, dry; no rashes or lesions      LABS:                        9.1    18.46 )-----------( 392      ( 23 Nov 2023 03:35 )             27.5     11-23    143  |  108  |  41.4<H>  ----------------------------<  125<H>  4.0   |  23.0  |  1.50<H>    Ca    9.2      23 Nov 2023 03:35  Phos  3.0     11-23  Mg     2.3     11-23        Urinalysis Basic - ( 23 Nov 2023 03:35 )    Color: x / Appearance: x / SG: x / pH: x  Gluc: 125 mg/dL / Ketone: x  / Bili: x / Urobili: x   Blood: x / Protein: x / Nitrite: x   Leuk Esterase: x / RBC: x / WBC x   Sq Epi: x / Non Sq Epi: x / Bacteria: x        11-22 @ 07:01  -  11-23 @ 07:00  --------------------------------------------------------  IN: 1380 mL / OUT: 400 mL / NET: 980 mL        RADIOLOGY & ADDITIONAL TESTS: HPI:  82y M with PMH HTN, CAD s/p stents, renal cell carcinoma status post left nephrectomy, bladder CA, BPH, CHF, LBBB, vertigo,  HLD, 5.5cm AAA without rupture post EVAR, paroxysmal A-fib on Eliquis, Plavix, and aspirin, early stage Alzheimer dementia transferred from [facility] s/p unwitnessed fall with head strike at home found by wife on floor at 8am. Patient brought to urgent care by wife and had 5 staples to posterior scalp but was instructed to go to nearest ED.  CT head at [place] showed R frontal IPH, SDH, SAH at 9:00. CTA stroke protocol not performed at [facility]. Patient BIB on 6L NC.  Pt GCS 15 on arrival, A&Ox2 on arrival. After initial assessment, patient noted to be vomiting enroute to CT scanner.         INTERVAL HPI/OVERNIGHT EVENTS:  11/10: R hemicraniectomy   11/11: IZABEL removed.   11/13: Afib with RVR< given lopressor and cardizem. extubated, tolerating well.  11/14: Respiratory/airway concerns started on cefepime for asp PNA.   11/15: started on aspirin. placed on high flow for inc. work of breathing   11/18: hypernatremia: started D5w. EEG negative, d/c EEG.   11.19: switched cefepime to ceftriaxone, d/c D5w, started on FW flushes for hypernatremia.   11/21:  New DVT LUE brachail veins, RUE superficial thrombosis cephalic vein. transitioned to Meropenem (ID FOLLOWING)   11/22: eliquis started for DVT.  Vascular consulted fo aortic aneurysm findings on CT chest.    11/23: CTH completed, preliminary stable. Downgraded to stepdown under neurosurgery. Passed for puree diet with moderately thick liquid with [id number] administration.     Vital Signs Last 24 Hrs  T(C): 37.6 (23 Nov 2023 04:00), Max: 37.9 (22 Nov 2023 23:00)  T(F): 99.7 (23 Nov 2023 04:00), Max: 100.2 (22 Nov 2023 23:00)  HR: 93 (23 Nov 2023 07:00) (74 - 107)  BP: 166/58 (23 Nov 2023 06:00) (102/65 - 166/58)  BP(mean): 81 (23 Nov 2023 06:00) (77 - 110)  RR: 18 (23 Nov 2023 07:00) (12 - 24)  SpO2: 96% (23 Nov 2023 07:00) (93% - 100%)    Parameters below as of 23 Nov 2023 04:00  Patient On (Oxygen Delivery Method): nasal cannula  O2 Flow (L/min): 2        PHYSICAL EXAM:  GENERAL: NAD, well-groomed, well-developed  HEAD:  Atraumatic, normocephalic, Flap soft.   WOUND: Dressing clean dry intact  MENTAL STATUS: AAO x2 to name and place (gives thumbs up); Awake ; Opens eyes spontaneously; following simple commands On R side.   CRANIAL NERVES: PERRL; EOMI; corneals intact b/l;  blinks to threat b/l; no facial asymmetry; facial sensation grossly intact to light touch b/l;  tongue midline; palate rises symmetrically; gag reflex intact  MOTOR: RUE 5/5, RLE 4/5. LUE 1/5, LLE 1/5.  CHEST/LUNG: Normal chest rise and expansion   HEART: +S1/+S2  SKIN: Warm, dry; no rashes or lesions      LABS:                        9.1    18.46 )-----------( 392      ( 23 Nov 2023 03:35 )             27.5     11-23    143  |  108  |  41.4<H>  ----------------------------<  125<H>  4.0   |  23.0  |  1.50<H>    Ca    9.2      23 Nov 2023 03:35  Phos  3.0     11-23  Mg     2.3     11-23        Urinalysis Basic - ( 23 Nov 2023 03:35 )    Color: x / Appearance: x / SG: x / pH: x  Gluc: 125 mg/dL / Ketone: x  / Bili: x / Urobili: x   Blood: x / Protein: x / Nitrite: x   Leuk Esterase: x / RBC: x / WBC x   Sq Epi: x / Non Sq Epi: x / Bacteria: x        11-22 @ 07:01  -  11-23 @ 07:00  --------------------------------------------------------    IN: 1380 mL / OUT: 400 mL / NET: 980 mL    Assessment:       RADIOLOGY & ADDITIONAL TESTS:    Assessment	  82y M with TBI - R frontal IPH, SDH, tSAH; s/p R decompressive hemicraniectomy 11/10.  Encephalopathy due to TBI, resp. infection, resp. distress, delirium.  Respiratory distress due to impaired bulbar function with difficulty managing secretions, aspiration PNA.  PMH of CAD s/p stents, CHF; HTN, HLD. HIT, AAA post EVAR. Parox. A.Fib, on Cardizem.   PMH of renal cell carcinoma status post left nephrectomy, bladder CA, BPH  Nonoliguric DONNIE, likely ROXANNE, critical illness; improving.  AV vegetation on TTE - unclear etiology. Several BCx negative.  Hypernatremia.     Plan -  - DG to stepdown, under medicine.   Neuro: EEG with no epileptiform discharges 11/18  neurochecks q2h, protected sleep time  amantadine 100mg daily, renally dosed  no indication for MRI w/wo for infx work-up at this time, afebrile, improving clinically  melatonin at night  Mobility: PT/OT    Resp:   monitor resp status, room air  aggressive chest PT, NT suctioning     CV:  cardiology consulted  maintain -150, HR<120  metoprolol 25mg BID, cardizem 60 BID  cont ASA 81 daily for CAD, statin  no indication for WHIT per cardiology    GI:Puree diete with moderately thick liquids, teaspoon administration.    ulcer ppx, BM regimen  LBM 11/22    Renal:   DONNIE: BUN/ CR  inc.   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 s/p unwitnessed fall with head strike at home found by wife on floor at 8am. Patient brought to urgent care by wife and had 5 staples to posterior scalp but was instructed to go to nearest ED.  CT head at [place] showed R frontal IPH, SDH, SAH at 9:00. CTA stroke protocol not performed at [facility]. Patient BIB on 6L NC.  Pt GCS 15 on arrival, A&Ox2 on arrival. After initial assessment, patient noted to be vomiting enroute to CT scanner.         INTERVAL HPI/OVERNIGHT EVENTS:  11/10: R hemicraniectomy   11/11: IZABEL removed.   11/13: Afib with RVR< given lopressor and cardizem. extubated, tolerating well.  11/14: Respiratory/airway concerns started on cefepime for asp PNA.   11/15: started on aspirin. placed on high flow for inc. work of breathing   11/18: hypernatremia: started D5w. EEG negative, d/c EEG.   11.19: switched cefepime to ceftriaxone, d/c D5w, started on FW flushes for hypernatremia.   11/21:  New DVT LUE brachail veins, RUE superficial thrombosis cephalic vein. transitioned to Meropenem (ID FOLLOWING)   11/22: eliquis started for DVT.   Non contrast CT exhibiting infrarenal endograph, with reported increase in sac size compared to prior studies  Vascular surgery eval requested due to concerns for possible endoleak   11/23: CTH completed, preliminary stable. Downgraded to stepdown under neurosurgery. Passed for puree diet with moderately thick liquid with [id number] administration.     Vital Signs Last 24 Hrs  T(C): 37.6 (23 Nov 2023 04:00), Max: 37.9 (22 Nov 2023 23:00)  T(F): 99.7 (23 Nov 2023 04:00), Max: 100.2 (22 Nov 2023 23:00)  HR: 93 (23 Nov 2023 07:00) (74 - 107)  BP: 166/58 (23 Nov 2023 06:00) (102/65 - 166/58)  BP(mean): 81 (23 Nov 2023 06:00) (77 - 110)  RR: 18 (23 Nov 2023 07:00) (12 - 24)  SpO2: 96% (23 Nov 2023 07:00) (93% - 100%)    Parameters below as of 23 Nov 2023 04:00  Patient On (Oxygen Delivery Method): nasal cannula  O2 Flow (L/min): 2        PHYSICAL EXAM:  GENERAL: NAD, well-groomed, well-developed  HEAD:  Atraumatic, normocephalic, Flap soft.   WOUND: Dressing clean dry intact  MENTAL STATUS: AAO x2 to name and place (gives thumbs up); Awake ; Opens eyes spontaneously; following simple commands On R side.   CRANIAL NERVES: PERRL; EOMI; corneals intact b/l;  blinks to threat b/l; no facial asymmetry; facial sensation grossly intact to light touch b/l;  tongue midline; palate rises symmetrically; gag reflex intact  MOTOR: RUE 5/5, RLE 4/5. LUE 1/5, LLE 1/5.  CHEST/LUNG: Normal chest rise and expansion   HEART: +S1/+S2  SKIN: Warm, dry; no rashes or lesions      LABS:                        9.1    18.46 )-----------( 392      ( 23 Nov 2023 03:35 )             27.5     11-23    143  |  108  |  41.4<H>  ----------------------------<  125<H>  4.0   |  23.0  |  1.50<H>    Ca    9.2      23 Nov 2023 03:35  Phos  3.0     11-23  Mg     2.3     11-23        Urinalysis Basic - ( 23 Nov 2023 03:35 )    Color: x / Appearance: x / SG: x / pH: x  Gluc: 125 mg/dL / Ketone: x  / Bili: x / Urobili: x   Blood: x / Protein: x / Nitrite: x   Leuk Esterase: x / RBC: x / WBC x   Sq Epi: x / Non Sq Epi: x / Bacteria: x        11-22 @ 07:01 - 11-23 @ 07:00  --------------------------------------------------------    IN: 1380 mL / OUT: 400 mL / NET: 980 mL    Assessment:       RADIOLOGY & ADDITIONAL TESTS:    Assessment	  82y M with TBI - R frontal IPH, SDH, tSAH; s/p R decompressive hemicraniectomy 11/10.  Encephalopathy due to TBI, resp. infection, resp. distress, delirium.  Respiratory distress due to impaired bulbar function with difficulty managing secretions, aspiration PNA.  PMH of CAD s/p stents, CHF; HTN, HLD. HIT, AAA post EVAR. Parox. A.Fib, on Cardizem.   PMH of renal cell carcinoma status post left nephrectomy, bladder CA, BPH  Nonoliguric DONNIE, likely RXOANNE, critical illness; improving.  AV vegetation on TTE - unclear etiology. Several BCx negative.  Hypernatremia.     Plan -  - DG to stepdown, under medicine.   Neuro: EEG with no epileptiform discharges 11/18  neurochecks q2h, protected sleep time  amantadine 100mg daily, renally dosed  no indication for MRI w/wo for infx work-up at this time, afebrile, improving clinically  melatonin at night  Mobility: PT/OT    Resp:   monitor resp status, room air  aggressive chest PT, NT suctioning     CV:  cardiology consulted  maintain -150, HR<120  metoprolol 25mg BID, cardizem 60 BID  cont ASA 81 daily for CAD, statin  no indication for WHIT per cardiology    GI:Puree diete with moderately thick liquids, teaspoon administration.    ulcer ppx, BM regimen  LBM 11/22    Renal:   Hx of renal artery stenting by Pt outpaitent vascular surgeon (shabnam)   uptrending BUN/ CR  inc.   monitor e-lytes and UOP  FWF to 250 q6hrs  ext catheter    ID: Klebs tracheobronchitis, 11/21 new leukocytosis >20  ID consulted  continue meropenem, F/u ID reccs.   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 Patient brought to urgent care by wife and had 5 staples to posterior scalp but was instructed to go to nearest ED.  CT head at [place] showed R frontal IPH, SDH, SAH at 9:00. CTA stroke protocol not performed at [facility]. Patient BIB on 6L NC.  Pt GCS 15 on arrival, A&Ox2 on arrival. After initial assessment, patient noted to be vomiting enroute to CT scanner.       INTERVAL HPI/OVERNIGHT EVENTS:    11/10: R hemicraniectomy   11/11: IZABEL removed.   11/13: Afib with RVR< given lopressor and cardizem. extubated, tolerating well.  11/14: Respiratory/airway concerns started on cefepime for asp PNA.   11/15: started on aspirin. placed on high flow for inc. work of breathing   11/18: hypernatremia: started D5w. EEG negative, d/c EEG.   11.19: switched cefepime to ceftriaxone, d/c D5w, started on FW flushes for hypernatremia.   11/21:  New DVT LUE brachail veins, RUE superficial thrombosis cephalic vein. transitioned to Meropenem (ID FOLLOWING)   11/22: eliquis started for DVT.   Non contrast CT exhibiting infrarenal endograph, with reported increase in sac size compared to prior studies  Vascular surgery eval requested due to concerns for possible endoleak   11/23: CTH completed, preliminary stable. Downgraded to stepdown under neurosurgery. Passed for puree diet with moderately thick liquid with [id number] administration.     Vital Signs Last 24 Hrs  T(C): 37.6 (23 Nov 2023 04:00), Max: 37.9 (22 Nov 2023 23:00)  T(F): 99.7 (23 Nov 2023 04:00), Max: 100.2 (22 Nov 2023 23:00)  HR: 93 (23 Nov 2023 07:00) (74 - 107)  BP: 166/58 (23 Nov 2023 06:00) (102/65 - 166/58)  BP(mean): 81 (23 Nov 2023 06:00) (77 - 110)  RR: 18 (23 Nov 2023 07:00) (12 - 24)  SpO2: 96% (23 Nov 2023 07:00) (93% - 100%)    Parameters below as of 23 Nov 2023 04:00  Patient On (Oxygen Delivery Method): nasal cannula  O2 Flow (L/min): 2        PHYSICAL EXAM:  GENERAL: NAD, well-groomed, well-developed  HEAD:  Atraumatic, normocephalic, Flap soft.   WOUND: Dressing clean dry intact  MENTAL STATUS: AAO x2 to name and place (gives thumbs up); Awake ; Opens eyes spontaneously; following simple commands On R side.   CRANIAL NERVES: PERRL; EOMI; corneals intact b/l;  blinks to threat b/l; no facial asymmetry; facial sensation grossly intact to light touch b/l;  tongue midline; palate rises symmetrically; gag reflex intact  MOTOR: RUE 5/5, RLE 4/5. LUE 1/5, LLE 1/5.  CHEST/LUNG: Normal chest rise and expansion   HEART: +S1/+S2  SKIN: Warm, dry; no rashes or lesions      LABS:                        9.1    18.46 )-----------( 392      ( 23 Nov 2023 03:35 )             27.5     11-23    143  |  108  |  41.4<H>  ----------------------------<  125<H>  4.0   |  23.0  |  1.50<H>    Ca    9.2      23 Nov 2023 03:35  Phos  3.0     11-23  Mg     2.3     11-23        Urinalysis Basic - ( 23 Nov 2023 03:35 )    Color: x / Appearance: x / SG: x / pH: x  Gluc: 125 mg/dL / Ketone: x  / Bili: x / Urobili: x   Blood: x / Protein: x / Nitrite: x   Leuk Esterase: x / RBC: x / WBC x   Sq Epi: x / Non Sq Epi: x / Bacteria: x        11-22 @ 07:01  -  11-23 @ 07:00  --------------------------------------------------------    IN: 1380 mL / OUT: 400 mL / NET: 980 mL    Assessment:       RADIOLOGY & ADDITIONAL TESTS:    Assessment	  82y M with TBI - R frontal IPH, SDH, tSAH; s/p R decompressive hemicraniectomy 11/10.  Encephalopathy due to TBI, resp. infection, resp. distress, delirium.  Respiratory distress due to impaired bulbar function with difficulty managing secretions, aspiration PNA.  PMH of CAD s/p stents, CHF; HTN, HLD. HIT, AAA post EVAR. Parox. A.Fib, on Cardizem.   PMH of renal cell carcinoma status post left nephrectomy, bladder CA, BPH  Nonoliguric DONNIE, likely ROXANNE, critical illness; improving.  AV vegetation on TTE - unclear etiology. Several BCx negative.  Hypernatremia.     Plan -  - DG to stepdown, under medicine.   Neuro: EEG with no epileptiform discharges 11/18  neurochecks q2h, protected sleep time  amantadine 100mg daily, renally dosed  no indication for MRI w/wo for infx work-up at this time, afebrile, improving clinically  melatonin at night  Mobility: PT/OT    Resp:   monitor resp status, room air  aggressive chest PT, NT suctioning     CV:  cardiology consulted  -repeat limited 2D TTE to evaluate valves  maintain -150, HR<120  metoprolol 25mg BID, cardizem 60 BID  cont ASA 81 daily for CAD, statin      GI:Puree diete with moderately thick liquids, teaspoon administration.    ulcer ppx, BM regimen  LBM 11/22    Renal:   Hx of renal artery stenting by Pt outpaitent vascular surgeon (shabnam)   uptrending BUN/ CR  inc.   monitor e-lytes and UOP  FWF to 250 q6hrs  ext catheter    ID: Klebs tracheobronchitis, 11/21 new leukocytosis >20  ID consulted  continue meropenem, F/u ID reccs.   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 Patient brought to urgent care by wife and had 5 staples to posterior scalp but was instructed to go to nearest ED.  CT head at [place] showed R frontal IPH, SDH, SAH at 9:00. CTA stroke protocol not performed at [facility]. Patient BIB on 6L NC.  Pt GCS 15 on arrival, A&Ox2 on arrival. After initial assessment, patient noted to be vomiting enroute to CT scanner.       INTERVAL HPI/OVERNIGHT EVENTS:    11/10: R hemicraniectomy   11/11: IZABEL removed.   11/13: Afib with RVR< given lopressor and cardizem. extubated, tolerating well.  11/14: Respiratory/airway concerns started on cefepime for asp PNA.   11/15: started on aspirin. placed on high flow for inc. work of breathing   11/18: hypernatremia: started D5w. EEG negative, d/c EEG.   11.19: switched cefepime to ceftriaxone, d/c D5w, started on FW flushes for hypernatremia.   11/21:  New DVT LUE brachail veins, RUE superficial thrombosis cephalic vein. transitioned to Meropenem (ID FOLLOWING)   11/22: eliquis started for DVT.   Non contrast CT exhibiting infrarenal endograph, with reported increase in sac size compared to prior studies  Vascular surgery eval requested due to concerns for possible endoleak   11/23: CTH completed, preliminary stable. Downgraded to stepdown under neurosurgery. Passed for puree diet with moderately thick liquid with [id number] administration.     Vital Signs Last 24 Hrs  T(C): 37.6 (23 Nov 2023 04:00), Max: 37.9 (22 Nov 2023 23:00)  T(F): 99.7 (23 Nov 2023 04:00), Max: 100.2 (22 Nov 2023 23:00)  HR: 93 (23 Nov 2023 07:00) (74 - 107)  BP: 166/58 (23 Nov 2023 06:00) (102/65 - 166/58)  BP(mean): 81 (23 Nov 2023 06:00) (77 - 110)  RR: 18 (23 Nov 2023 07:00) (12 - 24)  SpO2: 96% (23 Nov 2023 07:00) (93% - 100%)    Parameters below as of 23 Nov 2023 04:00  Patient On (Oxygen Delivery Method): nasal cannula  O2 Flow (L/min): 2        PHYSICAL EXAM:  GENERAL: NAD, well-groomed, well-developed  HEAD:  Atraumatic, normocephalic, Flap soft.   WOUND: Dressing clean dry intact  MENTAL STATUS: AAO x2 to name and place (gives thumbs up); Awake ; Opens eyes spontaneously; following simple commands On R side.   CRANIAL NERVES: PERRL; EOMI; corneals intact b/l;  blinks to threat b/l; no facial asymmetry; facial sensation grossly intact to light touch b/l;  tongue midline; palate rises symmetrically; gag reflex intact  MOTOR: RUE 5/5, RLE 4/5. LUE 1/5, LLE 1/5.  CHEST/LUNG: Normal chest rise and expansion   HEART: +S1/+S2  SKIN: Warm, dry; no rashes or lesions      LABS:                        9.1    18.46 )-----------( 392      ( 23 Nov 2023 03:35 )             27.5     11-23    143  |  108  |  41.4<H>  ----------------------------<  125<H>  4.0   |  23.0  |  1.50<H>    Ca    9.2      23 Nov 2023 03:35  Phos  3.0     11-23  Mg     2.3     11-23        Urinalysis Basic - ( 23 Nov 2023 03:35 )    Color: x / Appearance: x / SG: x / pH: x  Gluc: 125 mg/dL / Ketone: x  / Bili: x / Urobili: x   Blood: x / Protein: x / Nitrite: x   Leuk Esterase: x / RBC: x / WBC x   Sq Epi: x / Non Sq Epi: x / Bacteria: x        11-22 @ 07:01  -  11-23 @ 07:00  --------------------------------------------------------    IN: 1380 mL / OUT: 400 mL / NET: 980 mL    Assessment:       RADIOLOGY & ADDITIONAL TESTS:    Assessment	  82y M with TBI - R frontal IPH, SDH, tSAH; s/p R decompressive hemicraniectomy 11/10.  Encephalopathy due to TBI, resp. infection, resp. distress, delirium.  Respiratory distress due to impaired bulbar function with difficulty managing secretions, aspiration PNA.  PMH of CAD s/p stents, CHF; HTN, HLD. HIT, AAA post EVAR. Parox. A.Fib, on Cardizem.   PMH of renal cell carcinoma status post left nephrectomy, bladder CA, BPH  Nonoliguric DONNIE, likely ROXANNE, critical illness; improving.  AV vegetation on TTE - unclear etiology. Several BCx negative.  Hypernatremia.     Plan -  - DG to stepdown, under medicine.   Neuro: EEG with no epileptiform discharges 11/18  neurochecks q2h, protected sleep time  amantadine 100mg daily, renally dosed  no indication for MRI w/wo for infx work-up at this time, afebrile, improving clinically  melatonin at night  Mobility: PT/OT    Resp:   monitor resp status, room air  aggressive chest PT, NT suctioning     CV:  cardiology consulted  -11/11/23: Moderately calcified aortic valve with mobile echodensity poorly   visualized. May represent artifact vs mobile calcification vs vegetation.   Consider WHIT if clinically indicated.  repeat limited 2D TTE to evaluate valves  maintain -150, HR<120  metoprolol 25mg BID, cardizem 60 BID  cont ASA 81 daily for CAD, statin      GI:Puree diete with moderately thick liquids, teaspoon administration.    ulcer ppx, BM regimen  LBM 11/22    Renal:   Hx of renal artery stenting by Pt outpaitent vascular surgeon (shabnam)   uptrending BUN/ CR  inc.   monitor e-lytes and UOP  FWF to 250 q6hrs  ext catheter    ID: Klebs tracheobronchitis, 11/21 new leukocytosis >20  ID consulted  continue meropenem, F/u ID reccs.   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 Patient brought to urgent care by wife and had 5 staples to posterior scalp but was instructed to go to nearest ED.  CT head at [place] showed R frontal IPH, SDH, SAH at 9:00. CTA stroke protocol not performed at [facility]. Patient BIB on 6L NC.  Pt GCS 15 on arrival, A&Ox2 on arrival. After initial assessment, patient noted to be vomiting enroute to CT scanner.       INTERVAL HPI/OVERNIGHT EVENTS:    11/10: R hemicraniectomy   11/11: IZABEL removed.   11/13: Afib with RVR< given lopressor and cardizem. extubated, tolerating well.  11/14: Respiratory/airway concerns started on cefepime for asp PNA.   11/15: started on aspirin. placed on high flow for inc. work of breathing   11/18: hypernatremia: started D5w. EEG negative, d/c EEG.   11.19: switched cefepime to ceftriaxone, d/c D5w, started on FW flushes for hypernatremia.   11/21:  New DVT LUE brachail veins, RUE superficial thrombosis cephalic vein. transitioned to Meropenem (ID FOLLOWING)   11/22: eliquis started for DVT.   Non contrast CT exhibiting infrarenal endograph, with reported increase in sac size compared to prior studies  Vascular surgery eval requested due to concerns for possible endoleak   11/23: CTH completed, preliminary stable. Downgraded to stepdown under neurosurgery. Passed for puree diet with moderately thick liquid with [id number] administration.     Vital Signs Last 24 Hrs  T(C): 37.6 (23 Nov 2023 04:00), Max: 37.9 (22 Nov 2023 23:00)  T(F): 99.7 (23 Nov 2023 04:00), Max: 100.2 (22 Nov 2023 23:00)  HR: 93 (23 Nov 2023 07:00) (74 - 107)  BP: 166/58 (23 Nov 2023 06:00) (102/65 - 166/58)  BP(mean): 81 (23 Nov 2023 06:00) (77 - 110)  RR: 18 (23 Nov 2023 07:00) (12 - 24)  SpO2: 96% (23 Nov 2023 07:00) (93% - 100%)    Parameters below as of 23 Nov 2023 04:00  Patient On (Oxygen Delivery Method): nasal cannula  O2 Flow (L/min): 2        PHYSICAL EXAM:  GENERAL: NAD, well-groomed, well-developed  HEAD:  Atraumatic, normocephalic, Flap soft.   WOUND: Dressing clean dry intact  MENTAL STATUS: AAO x2 to name and place (gives thumbs up); Awake ; Opens eyes spontaneously; following simple commands On R side.   CRANIAL NERVES: PERRL; EOMI; corneals intact b/l;  blinks to threat b/l; no facial asymmetry; facial sensation grossly intact to light touch b/l;  tongue midline; palate rises symmetrically; gag reflex intact  MOTOR: RUE 5/5, RLE 4/5. LUE 1/5, LLE 1/5.  CHEST/LUNG: Normal chest rise and expansion   HEART: +S1/+S2  SKIN: Warm, dry; no rashes or lesions      LABS:                        9.1    18.46 )-----------( 392      ( 23 Nov 2023 03:35 )             27.5     11-23    143  |  108  |  41.4<H>  ----------------------------<  125<H>  4.0   |  23.0  |  1.50<H>    Ca    9.2      23 Nov 2023 03:35  Phos  3.0     11-23  Mg     2.3     11-23        Urinalysis Basic - ( 23 Nov 2023 03:35 )    Color: x / Appearance: x / SG: x / pH: x  Gluc: 125 mg/dL / Ketone: x  / Bili: x / Urobili: x   Blood: x / Protein: x / Nitrite: x   Leuk Esterase: x / RBC: x / WBC x   Sq Epi: x / Non Sq Epi: x / Bacteria: x        11-22 @ 07:01  -  11-23 @ 07:00  --------------------------------------------------------    IN: 1380 mL / OUT: 400 mL / NET: 980 mL    Assessment:       RADIOLOGY & ADDITIONAL TESTS:    Assessment	  82y M with TBI - R frontal IPH, SDH, tSAH; s/p R decompressive hemicraniectomy 11/10.  Encephalopathy due to TBI, resp. infection, resp. distress, delirium.  Respiratory distress due to impaired bulbar function with difficulty managing secretions, aspiration PNA.  PMH of CAD s/p stents, CHF; HTN, HLD. HIT, AAA post EVAR. Parox. A.Fib, on Cardizem.   PMH of renal cell carcinoma status post left nephrectomy, bladder CA, BPH  Nonoliguric DONNIE, likely ROXANNE, critical illness; improving.  AV vegetation on TTE - unclear etiology. Several BCx negative.  Hypernatremia.     Plan -  - DG to stepdown, under medicine.   Neuro: EEG with no epileptiform discharges 11/18  neurochecks q2h, protected sleep time  amantadine 100mg daily, renally dosed  no indication for MRI w/wo for infx work-up at this time, afebrile, improving clinically  melatonin at night  Mobility: PT/OT    Resp:   monitor resp status, room air  aggressive chest PT, NT suctioning     CV:  cardiology consulted  -11/11/23: Moderately calcified aortic valve with mobile echodensity poorly   visualized. May represent artifact vs mobile calcification vs vegetation.   Consider WHIT if clinically indicated.  repeat limited 2D TTE to evaluate valves  maintain -150, HR<120  metoprolol 25mg BID, cardizem 60 BID  cont ASA 81 daily for CAD, statin      GI:Puree diete with moderately thick liquids, teaspoon administration.    ulcer ppx, BM regimen  LBM 11/22    Renal:   Hx of renal artery stenting by Pt outpaitent vascular surgeon (shabnam)   uptrending BUN/ CR  inc.   monitor e-lytes and UOP  Hypernatremia resolving on ; FWF to 250 q6hrs , please d/c when deemed appropiate.   ext catheter    ID: Klebs tracheobronchitis, 11/21 new leukocytosis >20  ID consulted  continue meropenem, F/u ID reccs.   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 Patient brought to urgent care by wife and had 5 staples to posterior scalp but was instructed to go to nearest ED.  CT head at [place] showed R frontal IPH, SDH, SAH at 9:00. CTA stroke protocol not performed at [facility]. Patient BIB on 6L NC.  Pt GCS 15 on arrival, A&Ox2 on arrival. After initial assessment, patient noted to be vomiting enroute to CT scanner.       INTERVAL HPI/OVERNIGHT EVENTS:    11/10: R hemicraniectomy   11/11: IZABEL removed.   11/13: Afib with RVR< given lopressor and cardizem. extubated, tolerating well.  11/14: Respiratory/airway concerns started on cefepime for asp PNA.   11/15: started on aspirin. placed on high flow for inc. work of breathing   11/18: hypernatremia: started D5w. EEG negative, d/c EEG.   11.19: switched cefepime to ceftriaxone, d/c D5w, started on FW flushes for hypernatremia.   11/21:  New DVT LUE brachail veins, RUE superficial thrombosis cephalic vein. transitioned to Meropenem (ID FOLLOWING)   11/22: eliquis started for DVT.   Non contrast CT exhibiting infrarenal endograph, with reported increase in sac size compared to prior studies  Vascular surgery eval requested due to concerns for possible endoleak   11/23: CTH completed, preliminary stable. Downgraded to stepdown under neurosurgery. Passed for puree diet with moderately thick liquid with [id number] administration.     Vital Signs Last 24 Hrs  T(C): 37.6 (23 Nov 2023 04:00), Max: 37.9 (22 Nov 2023 23:00)  T(F): 99.7 (23 Nov 2023 04:00), Max: 100.2 (22 Nov 2023 23:00)  HR: 93 (23 Nov 2023 07:00) (74 - 107)  BP: 166/58 (23 Nov 2023 06:00) (102/65 - 166/58)  BP(mean): 81 (23 Nov 2023 06:00) (77 - 110)  RR: 18 (23 Nov 2023 07:00) (12 - 24)  SpO2: 96% (23 Nov 2023 07:00) (93% - 100%)    Parameters below as of 23 Nov 2023 04:00  Patient On (Oxygen Delivery Method): nasal cannula  O2 Flow (L/min): 2        PHYSICAL EXAM:  GENERAL: NAD, well-groomed, well-developed  HEAD:  Atraumatic, normocephalic, Flap soft.   WOUND: Dressing clean dry intact  MENTAL STATUS: AAO x2 to name and place (gives thumbs up); Awake ; Opens eyes spontaneously; following simple commands On R side.   CRANIAL NERVES: PERRL; EOMI; corneals intact b/l;  blinks to threat b/l; no facial asymmetry; facial sensation grossly intact to light touch b/l;  tongue midline; palate rises symmetrically; gag reflex intact  MOTOR: RUE 5/5, RLE 4/5. LUE 1/5, LLE 1/5.  CHEST/LUNG: Normal chest rise and expansion   HEART: +S1/+S2  SKIN: Warm, dry; no rashes or lesions      LABS:                        9.1    18.46 )-----------( 392      ( 23 Nov 2023 03:35 )             27.5     11-23    143  |  108  |  41.4<H>  ----------------------------<  125<H>  4.0   |  23.0  |  1.50<H>    Ca    9.2      23 Nov 2023 03:35  Phos  3.0     11-23  Mg     2.3     11-23        Urinalysis Basic - ( 23 Nov 2023 03:35 )    Color: x / Appearance: x / SG: x / pH: x  Gluc: 125 mg/dL / Ketone: x  / Bili: x / Urobili: x   Blood: x / Protein: x / Nitrite: x   Leuk Esterase: x / RBC: x / WBC x   Sq Epi: x / Non Sq Epi: x / Bacteria: x        11-22 @ 07:01  -  11-23 @ 07:00  --------------------------------------------------------    IN: 1380 mL / OUT: 400 mL / NET: 980 mL    Assessment:       RADIOLOGY & ADDITIONAL TESTS:    Assessment	  82y M with TBI - R frontal IPH, SDH, tSAH; s/p R decompressive hemicraniectomy 11/10.  Encephalopathy due to TBI, resp. infection, resp. distress, delirium.  Respiratory distress due to impaired bulbar function with difficulty managing secretions, aspiration PNA.  PMH of CAD s/p stents, CHF; HTN, HLD. HIT, AAA post EVAR. Parox. A.Fib, on Cardizem.   PMH of renal cell carcinoma status post left nephrectomy, bladder CA, BPH  Nonoliguric DONNIE, likely ROXANNE, critical illness; improving.  AV vegetation on TTE - unclear etiology. Several BCx negative.  Hypernatremia.     Plan -  - DG to stepdown, under medicine.   Neuro: EEG with epileptiform discharges 11/18  neurochecks q2h, protected sleep time  amantadine 100mg daily, renally dosed  no indication for MRI w/wo for infx work-up at this time, afebrile, improving clinically  melatonin at night  Mobility: PT/OT    Resp:   monitor resp status, room air  aggressive chest PT, NT suctioning     CV:  cardiology consulted  -11/11/23: Moderately calcified aortic valve with mobile echodensity poorly   visualized. May represent artifact vs mobile calcification vs vegetation.   Consider WHIT if clinically indicated.  repeat limited 2D TTE to evaluate valves  maintain -150, HR<120  metoprolol 25mg BID, cardizem 60 BID  cont ASA 81 daily for CAD, statin      GI:Puree diete with moderately thick liquids, teaspoon administration.    ulcer ppx, BM regimen  LBM 11/22    Renal:   Hx of renal artery stenting by Pt outpaitent vascular surgeon (shabnam)   uptrending BUN/ CR  inc.   monitor e-lytes and UOP  Hypernatremia resolving on ; FWF to 250 q6hrs , please d/c when deemed appropiate.   ext catheter    ID: Klebs tracheobronchitis, 11/21 new leukocytosis >20  ID consulted  continue meropenem, F/u ID reccs.   Trend fever curve and WBC    Heme:  SCDs,  h/o HIT- no heparin  LUE brachial DVT- continue eliquis  arterial doppler- neg

## 2023-11-23 NOTE — PROGRESS NOTE ADULT - NS ATTEND AMEND GEN_ALL_CORE FT
Neurosurgery Attending Attestation:    Patient seen and examined at bedside. Agree with plan and note as documented above.    82 y/o M s/p R craniectomy for ICH evacuation 11/10/2023, now s/p extubation, oriented to self, not hospital or year, FC in RUE and RLE. Trace movement in LUE and LLE. Continue monitoring for respiratory concerns. Care per [facility].    -Marty Larios MD.

## 2023-11-24 LAB
ANION GAP SERPL CALC-SCNC: 18 MMOL/L — HIGH (ref 5–17)
ANION GAP SERPL CALC-SCNC: 18 MMOL/L — HIGH (ref 5–17)
BUN SERPL-MCNC: 38.6 MG/DL — HIGH (ref 8–20)
BUN SERPL-MCNC: 38.6 MG/DL — HIGH (ref 8–20)
CALCIUM SERPL-MCNC: 9.2 MG/DL — SIGNIFICANT CHANGE UP (ref 8.4–10.5)
CALCIUM SERPL-MCNC: 9.2 MG/DL — SIGNIFICANT CHANGE UP (ref 8.4–10.5)
CHLORIDE SERPL-SCNC: 105 MMOL/L — SIGNIFICANT CHANGE UP (ref 96–108)
CHLORIDE SERPL-SCNC: 105 MMOL/L — SIGNIFICANT CHANGE UP (ref 96–108)
CO2 SERPL-SCNC: 18 MMOL/L — LOW (ref 22–29)
CO2 SERPL-SCNC: 18 MMOL/L — LOW (ref 22–29)
CREAT SERPL-MCNC: 1.28 MG/DL — SIGNIFICANT CHANGE UP (ref 0.5–1.3)
CREAT SERPL-MCNC: 1.28 MG/DL — SIGNIFICANT CHANGE UP (ref 0.5–1.3)
EGFR: 56 ML/MIN/1.73M2 — LOW
EGFR: 56 ML/MIN/1.73M2 — LOW
GLUCOSE SERPL-MCNC: 134 MG/DL — HIGH (ref 70–99)
GLUCOSE SERPL-MCNC: 134 MG/DL — HIGH (ref 70–99)
HCT VFR BLD CALC: 34.2 % — LOW (ref 39–50)
HCT VFR BLD CALC: 34.2 % — LOW (ref 39–50)
HGB BLD-MCNC: 10.8 G/DL — LOW (ref 13–17)
HGB BLD-MCNC: 10.8 G/DL — LOW (ref 13–17)
MCHC RBC-ENTMCNC: 30.9 PG — SIGNIFICANT CHANGE UP (ref 27–34)
MCHC RBC-ENTMCNC: 30.9 PG — SIGNIFICANT CHANGE UP (ref 27–34)
MCHC RBC-ENTMCNC: 31.6 GM/DL — LOW (ref 32–36)
MCHC RBC-ENTMCNC: 31.6 GM/DL — LOW (ref 32–36)
MCV RBC AUTO: 97.7 FL — SIGNIFICANT CHANGE UP (ref 80–100)
MCV RBC AUTO: 97.7 FL — SIGNIFICANT CHANGE UP (ref 80–100)
PLATELET # BLD AUTO: 337 K/UL — SIGNIFICANT CHANGE UP (ref 150–400)
PLATELET # BLD AUTO: 337 K/UL — SIGNIFICANT CHANGE UP (ref 150–400)
POTASSIUM SERPL-MCNC: 4.4 MMOL/L — SIGNIFICANT CHANGE UP (ref 3.5–5.3)
POTASSIUM SERPL-MCNC: 4.4 MMOL/L — SIGNIFICANT CHANGE UP (ref 3.5–5.3)
POTASSIUM SERPL-SCNC: 4.4 MMOL/L — SIGNIFICANT CHANGE UP (ref 3.5–5.3)
POTASSIUM SERPL-SCNC: 4.4 MMOL/L — SIGNIFICANT CHANGE UP (ref 3.5–5.3)
RBC # BLD: 3.5 M/UL — LOW (ref 4.2–5.8)
RBC # BLD: 3.5 M/UL — LOW (ref 4.2–5.8)
RBC # FLD: 13 % — SIGNIFICANT CHANGE UP (ref 10.3–14.5)
RBC # FLD: 13 % — SIGNIFICANT CHANGE UP (ref 10.3–14.5)
SODIUM SERPL-SCNC: 141 MMOL/L — SIGNIFICANT CHANGE UP (ref 135–145)
SODIUM SERPL-SCNC: 141 MMOL/L — SIGNIFICANT CHANGE UP (ref 135–145)
WBC # BLD: 17.32 K/UL — HIGH (ref 3.8–10.5)
WBC # BLD: 17.32 K/UL — HIGH (ref 3.8–10.5)
WBC # FLD AUTO: 17.32 K/UL — HIGH (ref 3.8–10.5)
WBC # FLD AUTO: 17.32 K/UL — HIGH (ref 3.8–10.5)

## 2023-11-24 PROCEDURE — 99233 SBSQ HOSP IP/OBS HIGH 50: CPT | Mod: GC

## 2023-11-24 PROCEDURE — 99232 SBSQ HOSP IP/OBS MODERATE 35: CPT

## 2023-11-24 PROCEDURE — 70450 CT HEAD/BRAIN W/O DYE: CPT | Mod: 26

## 2023-11-24 RX ADMIN — Medication 60 MILLIGRAM(S): at 17:17

## 2023-11-24 RX ADMIN — MEROPENEM 1000 MILLIGRAM(S): 1 INJECTION INTRAVENOUS at 05:28

## 2023-11-24 RX ADMIN — APIXABAN 5 MILLIGRAM(S): 2.5 TABLET, FILM COATED ORAL at 12:17

## 2023-11-24 RX ADMIN — Medication 3 MILLIGRAM(S): at 22:28

## 2023-11-24 RX ADMIN — Medication 100 MILLIGRAM(S): at 05:28

## 2023-11-24 RX ADMIN — Medication 25 MILLIGRAM(S): at 00:17

## 2023-11-24 RX ADMIN — Medication 81 MILLIGRAM(S): at 12:16

## 2023-11-24 RX ADMIN — MEROPENEM 1000 MILLIGRAM(S): 1 INJECTION INTRAVENOUS at 17:19

## 2023-11-24 RX ADMIN — Medication 60 MILLIGRAM(S): at 05:28

## 2023-11-24 RX ADMIN — Medication 1 TABLET(S): at 12:16

## 2023-11-24 RX ADMIN — APIXABAN 5 MILLIGRAM(S): 2.5 TABLET, FILM COATED ORAL at 22:27

## 2023-11-24 RX ADMIN — Medication 10 MILLIGRAM(S): at 09:02

## 2023-11-24 RX ADMIN — LEVALBUTEROL 1.25 MILLIGRAM(S): 1.25 SOLUTION, CONCENTRATE RESPIRATORY (INHALATION) at 15:36

## 2023-11-24 RX ADMIN — LEVALBUTEROL 1.25 MILLIGRAM(S): 1.25 SOLUTION, CONCENTRATE RESPIRATORY (INHALATION) at 21:33

## 2023-11-24 RX ADMIN — Medication 1 MILLIGRAM(S): at 12:16

## 2023-11-24 RX ADMIN — SENNA PLUS 2 TABLET(S): 8.6 TABLET ORAL at 22:27

## 2023-11-24 RX ADMIN — ATORVASTATIN CALCIUM 40 MILLIGRAM(S): 80 TABLET, FILM COATED ORAL at 22:27

## 2023-11-24 RX ADMIN — Medication 25 MILLIGRAM(S): at 12:17

## 2023-11-24 RX ADMIN — LEVALBUTEROL 1.25 MILLIGRAM(S): 1.25 SOLUTION, CONCENTRATE RESPIRATORY (INHALATION) at 09:11

## 2023-11-24 RX ADMIN — CHLORHEXIDINE GLUCONATE 1 APPLICATION(S): 213 SOLUTION TOPICAL at 05:37

## 2023-11-24 NOTE — PROGRESS NOTE ADULT - SUBJECTIVE AND OBJECTIVE BOX
FUNCTIONAL PROGRESS  SLP 11/23   Recommendations  Speech Language Pathology Recommendations: 1. Initiate moderately thick liquid via tsp trials, continue puree as tolerated2. STRICT aspiration precautions: if overt s/s noted, d/c PO diet immediately & resume NPO status pending re-assess by this dept3. Non-oral meds, continue via NGT4. 1:1 assist for all meals5. Only feed when **AWAKE/ALERT **6. Upright for all PO, small bites/sips, slow rate7. Oral care8. Monitor closely for tolerance & stable respiratory status 9. SLP to follow     PT 11/20     Bed Mobility  Bed Mobility Training Supine-to-Sit: maximum assist (25% patient effort);  2 person assist  Bed Mobility Training Limitations: decreased ability to use legs for bridging/pushing;  decreased ability to use arms for pushing/pulling;  impaired balance;  decreased strength;  impaired postural control;  impaired coordination    Bed-Chair Transfer Training  Transfer Training Bed-to-Chair Transfer: dependent (less than 25% patient effort);  2 person assist;  weight-bearing as tolerated   Pop over transfer bed to chair   Bed-to-Chair Transfer Training Transfer Safety Analysis: decreased balance;  decreased cognition;  decreased weight-shifting ability;  decreased strength;  impaired balance;  impaired postural control;  Verbal cues needed for upward gaze, and upward standing posture.     Sit-Stand Transfer Training  Transfer Training Sit-to-Stand Transfer: maximum assist (25% patient effort);  2 person assist;  weight-bearing as tolerated   bilateral hand held assist   Transfer Training Stand-to-Sit Transfer: maximum assist (25% patient effort);  2 person assist;  weight-bearing as tolerated   bilateral hand held assist   Sit-to-Stand Transfer Training Transfer Safety Analysis: decreased balance;  decreased weight-shifting ability;  decreased strength;  impaired balance;  impaired coordination;  impaired postural control;  bilateral hand held assist     Gait Training  Gait Training: maximum assist (25% patient effort);  2 person assist;  weight-bearing as tolerated   1 step   Gait Analysis: decreased trace;  shuffling;  decreased step length;  decreased strength;  impaired balance;  impaired postural control;  bilateral hand held assist   Brace/Orthotics Brace/Orthotics: Helmet donned     Therapeutic Exercise  Therapeutic Exercise Detail: Diaphragmatic breathing with all rest breaks     OT 11/20    Lower Body Dressing Training  Lower Body Dressing Training Assistance: dependent (less than 25% patient effort);  decreased ROM;  decreased strength;  impaired balance    Upper Body Dressing Training  Upper Body Dressing Training Assistance: dependent (less than 25% patient effort);  decreased ROM;  decreased strength;  impaired balance      VITALS  T(C): 36.9 (11-24-23 @ 08:00), Max: 37.3 (11-24-23 @ 04:40)  HR: 116 (11-24-23 @ 09:14) (89 - 116)  BP: 164/86 (11-24-23 @ 08:00) (96/81 - 167/83)  RR: 20 (11-24-23 @ 08:00) (16 - 24)  SpO2: 97% (11-24-23 @ 09:14) (94% - 99%)  Wt(kg): --    MEDICATIONS   acetaminophen   Oral Liquid .. 800 milliGRAM(s) every 6 hours PRN  amantadine 100 milliGRAM(s) <User Schedule>  apixaban 5 milliGRAM(s) every 12 hours  aspirin  chewable 81 milliGRAM(s) daily  atorvastatin 40 milliGRAM(s) at bedtime  chlorhexidine 2% Cloths 1 Application(s) <User Schedule>  diltiazem    Tablet 60 milliGRAM(s) <User Schedule>  folic acid 1 milliGRAM(s) daily  hydrALAZINE Injectable 10 milliGRAM(s) every 2 hours PRN  labetalol Injectable 10 milliGRAM(s) every 2 hours PRN  levalbuterol Inhalation 1.25 milliGRAM(s) every 6 hours  melatonin 3 milliGRAM(s) at bedtime  meropenem Injectable 1000 milliGRAM(s) every 12 hours  meropenem Injectable     metoprolol tartrate 25 milliGRAM(s) <User Schedule>  Nephro-roma 1 Tablet(s) daily  polyethylene glycol 3350 17 Gram(s) daily  senna 2 Tablet(s) at bedtime      RECENT LABS/IMAGING  - Reviewed Today                        9.1    18.46 )-----------( 392      ( 23 Nov 2023 03:35 )             27.5     11-23    143  |  108  |  41.4<H>  ----------------------------<  125<H>  4.0   |  23.0  |  1.50<H>    Ca    9.2      23 Nov 2023 03:35  Phos  3.0     11-23  Mg     2.3     11-23        Urinalysis Basic - ( 23 Nov 2023 03:35 )    Color: x / Appearance: x / SG: x / pH: x  Gluc: 125 mg/dL / Ketone: x  / Bili: x / Urobili: x   Blood: x / Protein: x / Nitrite: x   Leuk Esterase: x / RBC: x / WBC x   Sq Epi: x / Non Sq Epi: x / Bacteria: x    CTH 11/16/23: Improved to stable multicompartment intracranial hemorrhages.   Right-sided subdural hygroma, larger in size.    US duplex BLE 11/16/23: No evidence of deep venous thrombosis in either lower extremity.    CXR 11/15/23: There is mild pulmonary venous congestion. Left retrocardiac/lower lobe   opacity could represent associated pneumonia.    MR Brain 11/13/23: RIGHT frontoparietal craniectomy with underlying   intracranial hemorrhage and edema within the RIGHT frontal lobe.   Overlying small RIGHT subdural hemorrhage is also unchanged. Scant   hemorrhage in the dependent portions of the BILATERAL lateral ventricles   and minimal subarachnoid hemorrhage seen in the BILATERAL frontal and   parietal lobes. Moderate periventricular and deep white matter ischemia   is noted. Encephalomalacia and gliosis seen in the anterior frontal lobes   bilaterally. Tiny acute lacunar infarction in the LEFT cerebellum and   tiny acute cortical infarction in the RIGHT parietal lobe. Restricted   diffusion also noted about the surgical cavity suggesting infarcted   parenchyma.    ----------------------------------------------------------------------------------------  PHYSICAL EXAM  Constitutional - somnolent, difficult to arouse   HEENT - +R crani, gauze dressing in place, no bleeding or soaking through dressing, +NGT  Chest - +rhonchi +Cough +inc work of breathing.   Cardiovascular - S1S2  Abdomen - Soft   Extremities - +left hand edema, BL LE SCDs in place.   Neurologic Exam -                    Cognitive - unable to arouse, per RN patient was AAO to name most of the time11/13-11/15.      Communication - unable to assess due to somnolence.      Cranial Nerves - unable to assess due to somnolence.      Motor -                    LEFT    UE - flaccid                     RIGHT UE - moves extremity spontaneously to pain                     LEFT    LE - moves extremity spontaneously to pain                     RIGHT LE - moves extremity spontaneously to pain       Sensory -  unable to assess due to somnolence.      Reflexes - unable to assess due to somnolence.      Coordination - unable to assess due to somnolence.     ----------------------------------------------------------------------------------------  ASSESSMENT/PLAN  83yMale with functional deficits after unwitnessed fall with +headstrike, found to have large IPH, SDH, SAH, is s/p R hemicraniectomy on 11/10/23. Post-op course c/b aspiration PNA, worsening lethargy.   IPH s/p craniectomy - on keppra for seizure ppx, EEG 11/17 shows slowing, no seizures, but increased risk. Worsening lethargy, ongoing medical workup.   Aspiration PNA - on cefepime   Pain - Tylenol, avoid sedating medications.   Afib - home eliquis held in setting in IPH, currently on diltiazem.   DVT PPX - SCDs, ASA and fondaparinux (hx of HIT)  Social issues - reviewed Ethics consult note on 11/13/23 regarding patient's HCP, marital and family status. Per Ethics note, " the appropriate surrogates for the patient are his daughters Jennifer and Angela. The team should continue to engage with them for medical decision making. Jennifer can be reached at [phone] and Angela can be reached at [phone]" -   Rehab - Patient currently with active medical issues, requiring continued ICU level care. Recent worsening lethargy over the past 2 days, with ongoing medical workup. He is currently unable to tolerate PT, OT and SLP sessions. Will continue to follow and resume therapies once he is medically stable to determine functional status and goals. Brain injury PM&R team will continue to follow. Recommend ongoing mobilization by staff to maintain cardiopulmonary function and prevention of secondary complications related to debility. Discussed with rehab team.      Will continue to follow and current recommendations may change if functional progress changes.                     Lethargic, opens eyes to voice   Minimal participation in exam   Left sided weakness  Tachycardic during examination, afebrile     FUNCTIONAL PROGRESS  SLP 11/23   Recommendations  Speech Language Pathology Recommendations: 1. Initiate moderately thick liquid via tsp trials, continue puree as tolerated2. STRICT aspiration precautions: if overt s/s noted, d/c PO diet immediately & resume NPO status pending re-assess by this dept3. Non-oral meds, continue via NGT4. 1:1 assist for all meals5. Only feed when **AWAKE/ALERT **6. Upright for all PO, small bites/sips, slow rate7. Oral care8. Monitor closely for tolerance & stable respiratory status 9. SLP to follow     PT 11/20   Bed Mobility  Bed Mobility Training Supine-to-Sit: maximum assist (25% patient effort);  2 person assist  Bed Mobility Training Limitations: decreased ability to use legs for bridging/pushing;  decreased ability to use arms for pushing/pulling;  impaired balance;  decreased strength;  impaired postural control;  impaired coordination    Bed-Chair Transfer Training  Transfer Training Bed-to-Chair Transfer: dependent (less than 25% patient effort);  2 person assist;  weight-bearing as tolerated   Pop over transfer bed to chair   Bed-to-Chair Transfer Training Transfer Safety Analysis: decreased balance;  decreased cognition;  decreased weight-shifting ability;  decreased strength;  impaired balance;  impaired postural control;  Verbal cues needed for upward gaze, and upward standing posture.     Sit-Stand Transfer Training  Transfer Training Sit-to-Stand Transfer: maximum assist (25% patient effort);  2 person assist;  weight-bearing as tolerated   bilateral hand held assist   Transfer Training Stand-to-Sit Transfer: maximum assist (25% patient effort);  2 person assist;  weight-bearing as tolerated   bilateral hand held assist   Sit-to-Stand Transfer Training Transfer Safety Analysis: decreased balance;  decreased weight-shifting ability;  decreased strength;  impaired balance;  impaired coordination;  impaired postural control;  bilateral hand held assist     Gait Training  Gait Training: maximum assist (25% patient effort);  2 person assist;  weight-bearing as tolerated   1 step   Gait Analysis: decreased trace;  shuffling;  decreased step length;  decreased strength;  impaired balance;  impaired postural control;  bilateral hand held assist   Brace/Orthotics Brace/Orthotics: Helmet donned     Therapeutic Exercise  Therapeutic Exercise Detail: Diaphragmatic breathing with all rest breaks     OT 11/20  Lower Body Dressing Training  Lower Body Dressing Training Assistance: dependent (less than 25% patient effort);  decreased ROM;  decreased strength;  impaired balance    Upper Body Dressing Training  Upper Body Dressing Training Assistance: dependent (less than 25% patient effort);  decreased ROM;  decreased strength;  impaired balance      VITALS  T(C): 36.9 (11-24-23 @ 08:00), Max: 37.3 (11-24-23 @ 04:40)  HR: 116 (11-24-23 @ 09:14) (89 - 116)  BP: 164/86 (11-24-23 @ 08:00) (96/81 - 167/83)  RR: 20 (11-24-23 @ 08:00) (16 - 24)  SpO2: 97% (11-24-23 @ 09:14) (94% - 99%)  Wt(kg): --    MEDICATIONS   acetaminophen   Oral Liquid .. 800 milliGRAM(s) every 6 hours PRN  amantadine 100 milliGRAM(s) <User Schedule>  apixaban 5 milliGRAM(s) every 12 hours  aspirin  chewable 81 milliGRAM(s) daily  atorvastatin 40 milliGRAM(s) at bedtime  chlorhexidine 2% Cloths 1 Application(s) <User Schedule>  diltiazem    Tablet 60 milliGRAM(s) <User Schedule>  folic acid 1 milliGRAM(s) daily  hydrALAZINE Injectable 10 milliGRAM(s) every 2 hours PRN  labetalol Injectable 10 milliGRAM(s) every 2 hours PRN  levalbuterol Inhalation 1.25 milliGRAM(s) every 6 hours  melatonin 3 milliGRAM(s) at bedtime  meropenem Injectable 1000 milliGRAM(s) every 12 hours  meropenem Injectable     metoprolol tartrate 25 milliGRAM(s) <User Schedule>  Nephro-roma 1 Tablet(s) daily  polyethylene glycol 3350 17 Gram(s) daily  senna 2 Tablet(s) at bedtime      RECENT LABS/IMAGING  - Reviewed Today                        9.1    18.46 )-----------( 392      ( 23 Nov 2023 03:35 )             27.5     11-23    143  |  108  |  41.4<H>  ----------------------------<  125<H>  4.0   |  23.0  |  1.50<H>    Ca    9.2      23 Nov 2023 03:35  Phos  3.0     11-23  Mg     2.3     11-23        Urinalysis Basic - ( 23 Nov 2023 03:35 )    Color: x / Appearance: x / SG: x / pH: x  Gluc: 125 mg/dL / Ketone: x  / Bili: x / Urobili: x   Blood: x / Protein: x / Nitrite: x   Leuk Esterase: x / RBC: x / WBC x   Sq Epi: x / Non Sq Epi: x / Bacteria: x    CTH 11/23 - Stable multicompartment intracranial hemorrhages. Stable right-sided subdural collection.    CTH 11/22 - Mixed attenuating subdural hematoma again seen with some   expected postoperative changes. Evolving area of parenchymal hemorrhage involving the right frontal   region.    CTH 11/16/23: Improved to stable multicompartment intracranial hemorrhages.   Right-sided subdural hygroma, larger in size.    US duplex BLE 11/16/23: No evidence of deep venous thrombosis in either lower extremity.    CXR 11/15/23: There is mild pulmonary venous congestion. Left retrocardiac/lower lobe   opacity could represent associated pneumonia.    MR Brain 11/13/23: RIGHT frontoparietal craniectomy with underlying   intracranial hemorrhage and edema within the RIGHT frontal lobe.   Overlying small RIGHT subdural hemorrhage is also unchanged. Scant   hemorrhage in the dependent portions of the BILATERAL lateral ventricles   and minimal subarachnoid hemorrhage seen in the BILATERAL frontal and   parietal lobes. Moderate periventricular and deep white matter ischemia   is noted. Encephalomalacia and gliosis seen in the anterior frontal lobes   bilaterally. Tiny acute lacunar infarction in the LEFT cerebellum and   tiny acute cortical infarction in the RIGHT parietal lobe. Restricted   diffusion also noted about the surgical cavity suggesting infarcted   parenchyma.    ----------------------------------------------------------------------------------------  PHYSICAL EXAM  Constitutional - somnolent, difficult to arouse   HEENT - +R crani, gauze dressing in place, no bleeding or soaking through dressing, +NGT  Chest - +rhonchi +Cough +inc work of breathing.   Cardiovascular - Tachycardia   Abdomen - Soft   Extremities - +left hand edema, BL LE SCDs in place.   Neurologic Exam -                    Cognitive - Intermittently follows commands       Communication - unable to assess due to somnolence.      Cranial Nerves - No facial asymmetry      Motor -                    LEFT    UE - flaccid                     RIGHT UE - moves extremity spontaneously to pain                     LEFT    LE - moves extremity spontaneously to pain                     RIGHT LE - moves extremity spontaneously to pain      ----------------------------------------------------------------------------------------  ASSESSMENT/PLAN  83yMale with functional deficits after multicompartmental intracranial bleeding.    Traumatic ICH - Stable   Alzheimer's Dementia - Namenda    Wakefulness - Recommend increasing Amantadine to 150mg @0600   CAD, PAfib - ASA, Statin, Dilt, Metop   HTN - Hydralazine, labetalol   Fever - Merrem   Dysphagia - Pureed/mod thick   Delirium - Recommend increasing melatonin 6mg qhs   LUE DVT - Apixaban   Dysphagia - Pureed/mod thick   DVT ppx - Apixaban   Rehab/Impaired mobility and function - Continuous hospitalization is crucial for managing the patient's acute medical issues (Traumatic ICH, AMS, decreased wakefulness), which have significantly impacted their mobility, quality of life, and function. Rehabilitation recommendations will be based on the patient's functional progress and their ability to participate in and tolerate therapeutic interventions, which may change over time. Maintaining ongoing mobilization by the staff is imperative to prevent secondary medical complications and associated health issues related to debility.    The patient continues to suffer from AMS with increased lethargy. Would recommend increasing Amantadine form 100mg @0600 to Amantadine 150mg @0600. Would also increase Melatonin 3mg qhs to Melatonin 6mg qhs.     The patient will likely benefit from HOLLY placement. PM&R will continue to follow.

## 2023-11-24 NOTE — PROGRESS NOTE ADULT - SUBJECTIVE AND OBJECTIVE BOX
CC: Patient is a 83y old  Male who presents with a chief complaint of s/p unwitnessed fall with head strike (24 Nov 2023 09:33)      Patient seen and examined at bedside, No acute overnight events.  Patient ambulating, eating well, voiding and last Bowel movement was   Cardiac monitor reviewed;    ROS: Denies fever, nausea, vomiting, chest pain, SOB, abdominal pain, diarrhea and constipation    Vital Sign  Vital Signs Last 24 Hrs  T(C): 36.9 (24 Nov 2023 08:00), Max: 37.3 (24 Nov 2023 04:40)  T(F): 98.5 (24 Nov 2023 08:00), Max: 99.2 (24 Nov 2023 04:40)  HR: 118 (24 Nov 2023 10:00) (89 - 118)  BP: 134/65 (24 Nov 2023 10:00) (96/81 - 167/83)  BP(mean): 105 (24 Nov 2023 08:00) (77 - 113)  RR: 18 (24 Nov 2023 10:00) (16 - 24)  SpO2: 97% (24 Nov 2023 10:00) (94% - 99%)    Parameters below as of 24 Nov 2023 10:00  Patient On (Oxygen Delivery Method): room air        Physical Exam:   Gen: NAD  HEENT: NCAT, EOMI, PERRLA, Pupils_  CVS: RRR, +S1/S2, no murmurs, rubs or gallops appreciated  Lungs: CTAB, no wheeze, rales, rhonchi  Abdomen: +BS, soft, ND, NT. no palpable flank tenderness or mass, no CVA tenderness  Ext: No cyanosis, edema or calf tenderness  Neuro: AAOx3, unable to move left upper and lower extremity, able to move right upper and lower extremity     Labs:                        9.1    18.46 )-----------( 392      ( 23 Nov 2023 03:35 )             27.5   11-23    143  |  108  |  41.4<H>  ----------------------------<  125<H>  4.0   |  23.0  |  1.50<H>    Ca    9.2      23 Nov 2023 03:35  Phos  3.0     11-23  Mg     2.3     11-23 11-23 @ 07:01 - 11-24 @ 07:00  --------------------------------------------------------  IN: 1490 mL / OUT: 900 mL / NET: 590 mL        Radiology:  *Pull    Medications:  MEDICATIONS  (STANDING):  amantadine 100 milliGRAM(s) Oral <User Schedule>  apixaban 5 milliGRAM(s) Oral every 12 hours  aspirin  chewable 81 milliGRAM(s) Oral daily  atorvastatin 40 milliGRAM(s) Oral at bedtime  chlorhexidine 2% Cloths 1 Application(s) Topical <User Schedule>  diltiazem    Tablet 60 milliGRAM(s) Oral <User Schedule>  folic acid 1 milliGRAM(s) Oral daily  levalbuterol Inhalation 1.25 milliGRAM(s) Inhalation every 6 hours  melatonin 3 milliGRAM(s) Oral at bedtime  meropenem Injectable 1000 milliGRAM(s) IV Push every 12 hours  meropenem Injectable      metoprolol tartrate 25 milliGRAM(s) Oral <User Schedule>  Nephro-roma 1 Tablet(s) Oral daily  polyethylene glycol 3350 17 Gram(s) Oral daily  senna 2 Tablet(s) Oral at bedtime    MEDICATIONS  (PRN):  acetaminophen   Oral Liquid .. 800 milliGRAM(s) Oral every 6 hours PRN Temp greater or equal to 38C (100.4F)  hydrALAZINE Injectable 10 milliGRAM(s) IV Push every 2 hours PRN SBP>160  labetalol Injectable 10 milliGRAM(s) IV Push every 2 hours PRN SBP>160

## 2023-11-24 NOTE — PROGRESS NOTE ADULT - NS ATTEND AMEND GEN_ALL_CORE FT
Neurosurgery Attending Attestation:    Patient seen and examined at bedside. Agree with plan and note as documented above.    82 y/o M s/p R craniectomy for ICH evacuation 11/10/2023, oriented to self, not hospital or year, FC in RUE and RLE. Trace movement in LUE and LLE. Ok for downgrade    -Marty Larios MD.

## 2023-11-24 NOTE — PROGRESS NOTE ADULT - SUBJECTIVE AND OBJECTIVE BOX
HPI:  HPI:  82y M with PMH HTN, CAD s/p stents, renal cell carcinoma status post left nephrectomy, bladder CA, BPH, CHF, LBBB, vertigo,  HLD, 5.5cm AAA without rupture post EVAR, paroxysmal A-fib on Eliquis, Plavix, and aspirin, early stage Alzheimer dementia transferred from [facility] s/p unwitnessed fall with head strike at home found by wife on floor at 8am. Patient brought to urgent care by wife and had 5 staples to posterior scalp but was instructed to go to nearest ED.  CT head at [place] showed R frontal IPH, SDH, SAH at 9:00. CTA stroke protocol not performed at [facility]. Patient BIB on 6L NC.  Pt GCS 15 on arrival, A&Ox2 on arrival. After initial assessment, patient noted to be vomiting enroute to CT scanner.          (10 Nov 2023 11:04)        INTERVAL HPI/OVERNIGHT EVENTS:  83y male seen and examined at bedside. ____    Vital Signs Last 24 Hrs  T(C): 36.9 (24 Nov 2023 08:00), Max: 37.3 (24 Nov 2023 04:40)  T(F): 98.5 (24 Nov 2023 08:00), Max: 99.2 (24 Nov 2023 04:40)  HR: 110 (24 Nov 2023 08:00) (89 - 110)  BP: 164/86 (24 Nov 2023 08:00) (96/81 - 167/83)  BP(mean): 105 (24 Nov 2023 08:00) (70 - 113)  RR: 20 (24 Nov 2023 08:00) (16 - 24)  SpO2: 97% (24 Nov 2023 08:00) (94% - 99%)    Parameters below as of 24 Nov 2023 08:00  Patient On (Oxygen Delivery Method): room air      PHYSICAL EXAM:  GENERAL: NAD, well-groomed, well-developed  HEAD:  Atraumatic, normocephalic, Flap soft.   WOUND: Dressing clean dry intact  MENTAL STATUS: AAO x2 to name and place (gives thumbs up); Awake ; Opens eyes spontaneously; following simple commands On R side.   CRANIAL NERVES: PERRL; EOMI; corneals intact b/l;  blinks to threat b/l; no facial asymmetry; facial sensation grossly intact to light touch b/l;  tongue midline; palate rises symmetrically; gag reflex intact  MOTOR: RUE 5/5, RLE 4/5. LUE 1/5, LLE 1/5.  CHEST/LUNG: Normal chest rise and expansion   HEART: +S1/+S2  SKIN: Warm, dry; no rashes or lesions          LABS:                        9.1    18.46 )-----------( 392      ( 23 Nov 2023 03:35 )             27.5     11-23    143  |  108  |  41.4<H>  ----------------------------<  125<H>  4.0   |  23.0  |  1.50<H>    Ca    9.2      23 Nov 2023 03:35  Phos  3.0     11-23  Mg     2.3     11-23        Urinalysis Basic - ( 23 Nov 2023 03:35 )    Color: x / Appearance: x / SG: x / pH: x  Gluc: 125 mg/dL / Ketone: x  / Bili: x / Urobili: x   Blood: x / Protein: x / Nitrite: x   Leuk Esterase: x / RBC: x / WBC x   Sq Epi: x / Non Sq Epi: x / Bacteria: x        11-23 @ 07:01  -  11-24 @ 07:00  --------------------------------------------------------  IN: 1490 mL / OUT: 900 mL / NET: 590 mL        RADIOLOGY & ADDITIONAL TESTS:  < from: CT Head No Cont (11.23.23 @ 02:24) >  IMPRESSION: Stable multicompartment intracranial hemorrhages. Stable   right-sided subdural collection.    --- End of Report ---      < end of copied text >  < from: CT Head No Cont (11.22.23 @ 09:17) >    IMPRESSION: Mixed attenuating subdural hematoma again seen with some   expected postoperative changes    Evolving area of parenchymal hemorrhage involving the right frontal   region.    --- End of Report ---    < end of copied text >     HPI:    82y M with PMH HTN, CAD s/p stents, renal cell carcinoma status post left nephrectomy, bladder CA, BPH, CHF, LBBB, vertigo,  HLD, 5.5cm AAA without rupture post EVAR, paroxysmal A-fib on Eliquis, Plavix, and aspirin, early stage Alzheimer dementia transferred from [facility] s/p unwitnessed fall with head strike at home found by wife on floor at 8am. Patient brought to urgent care by wife and had 5 staples to posterior scalp but was instructed to go to nearest ED.  CT head at [place] showed R frontal IPH, SDH, SAH at 9:00. CTA stroke protocol not performed at [facility]. Patient BIB on 6L NC.  Pt GCS 15 on arrival, A&Ox2 on arrival. After initial assessment, patient noted to be vomiting enroute to CT scanner.          (10 Nov 2023 11:04)    INTERVAL HPI/OVERNIGHT EVENTS:  83y male seen and examined at bedside.  no complaints at this time. Pending CT head Clifford protocol  for cranioplasty planning. Flap soft.     Vital Signs Last 24 Hrs  T(C): 36.9 (24 Nov 2023 08:00), Max: 37.3 (24 Nov 2023 04:40)  T(F): 98.5 (24 Nov 2023 08:00), Max: 99.2 (24 Nov 2023 04:40)  HR: 110 (24 Nov 2023 08:00) (89 - 110)  BP: 164/86 (24 Nov 2023 08:00) (96/81 - 167/83)  BP(mean): 105 (24 Nov 2023 08:00) (70 - 113)  RR: 20 (24 Nov 2023 08:00) (16 - 24)  SpO2: 97% (24 Nov 2023 08:00) (94% - 99%)    Parameters below as of 24 Nov 2023 08:00  Patient On (Oxygen Delivery Method): room air      PHYSICAL EXAM:  GENERAL: NAD, well-groomed, well-developed  HEAD:  Atraumatic, normocephalic, Flap soft.   WOUND: Dressing clean dry intact  MENTAL STATUS: AAO x2 to name and place (gives thumbs up); Awake ; Opens eyes spontaneously; following simple commands On R side.   CRANIAL NERVES: PERRL; EOMI; corneals intact b/l;  blinks to threat b/l; no facial asymmetry; facial sensation grossly intact to light touch b/l;  tongue midline; palate rises symmetrically; gag reflex intact  MOTOR: RUE 5/5, RLE 4/5. LUE 1/5, LLE 1/5.  CHEST/LUNG: Normal chest rise and expansion   HEART: +S1/+S2  SKIN: Warm, dry; no rashes or lesions          LABS:                        9.1    18.46 )-----------( 392      ( 23 Nov 2023 03:35 )             27.5     11-23    143  |  108  |  41.4<H>  ----------------------------<  125<H>  4.0   |  23.0  |  1.50<H>    Ca    9.2      23 Nov 2023 03:35  Phos  3.0     11-23  Mg     2.3     11-23        Urinalysis Basic - ( 23 Nov 2023 03:35 )    Color: x / Appearance: x / SG: x / pH: x  Gluc: 125 mg/dL / Ketone: x  / Bili: x / Urobili: x   Blood: x / Protein: x / Nitrite: x   Leuk Esterase: x / RBC: x / WBC x   Sq Epi: x / Non Sq Epi: x / Bacteria: x        11-23 @ 07:01  -  11-24 @ 07:00  --------------------------------------------------------  IN: 1490 mL / OUT: 900 mL / NET: 590 mL        RADIOLOGY & ADDITIONAL TESTS:  < from: CT Head No Cont (11.23.23 @ 02:24) >  IMPRESSION: Stable multicompartment intracranial hemorrhages. Stable   right-sided subdural collection.    --- End of Report ---      < end of copied text >  < from: CT Head No Cont (11.22.23 @ 09:17) >    IMPRESSION: Mixed attenuating subdural hematoma again seen with some   expected postoperative changes    Evolving area of parenchymal hemorrhage involving the right frontal   region.    --- End of Report ---    < end of copied text >

## 2023-11-24 NOTE — PROGRESS NOTE ADULT - ASSESSMENT
81 yo male POD#13 s/p a right decompressive hemicraniectomy by Dr. Larios    -Neuroccks  -Pain Control, avoid over sedation  - recommend pt to wear helmet when OOB.  -Flap remains full but soft, timing of cranioplasty TBD  - stat ct head with [facility] protocol 0 gantry with 1mm slices.   -EEG negative. AED ppx d/c'd 7 days post op completed.  -DVT ppx with SCD and Eliquis 5 BID  -Medical management per med team  -Plan of care to be discussed in AM with neurosurgery attending   83 yo male POD#13 s/p a right decompressive hemicraniectomy by Dr. Larios    - q2 neurochecks  -Pain Control, avoid over sedation  - recommend pt to wear helmet when OOB.  - Flap remains full but soft, timing of cranioplasty TBD  - stat ct head with [employer] protocol 0 gantry with 1mm slices.   -EEG negative. AED ppx d/c'd 7 days post op completed.  -DVT ppx with SCD and Eliquis 5 BID  -Medical management per med team  -Plan of care to be discussed in AM with neurosurgery attending

## 2023-11-24 NOTE — PROGRESS NOTE ADULT - ATTENDING COMMENTS
Patient seen and examined, discussed patient with Dr. Tovar and agree with recommendations.    Rehab/Impaired mobility and function - Patient continues to require hospitalization for the above diagnoses and ongoing active management of comorbid complications that are substantially impairing functional ability and impairing quality of life necessitating ongoing medical management of these complications.     Spoke with wife regarding the need for significant assist and recommended rehab.    When medically optimized, based on the patient's diagnosis, current functional status, and potential for progress, recommend HOLLY, patient DOES NOT meet acute inpatient rehabilitation criteria. Patient needs a more prolonged stay to achieve transition to community living and would not be able to tolerate a comprehensive/intense rehab program of 3hours/day.     Will continue to follow. Rehab recommendations are dependent on how functional progress changes as well as how patient continues to participate and tolerate therapeutic interventions, which may change. Recommend ongoing mobilization by staff to maintain cardiopulmonary function and prevention of secondary complications related to debility. Discussed the specific management and recommendations above with rehab clinical care team/rehab liaison.

## 2023-11-24 NOTE — PROGRESS NOTE ADULT - ASSESSMENT
Patient is a 84 yo M w/ PMH of CAD s/p stents, HTN, Renal Cell CA s/p L nephrectomy, Bladder CA, BPH, CHF, LBBB, Vertigo, HLD, 5.5cm AAA without rupture post EVAR, paroxysmal A-fib on Eliquis, Plavix, and aspirin, early stage Alzheimer dementia who presented to [facility] s/p fall and found to have  R frontal IPH, SDH, SAH. He was transferred to [facility] Neuro-ICU for further care. NSG was consulted. IZABEL drain was placed. He underwent R hemicraniectomy on 11/10. He had his IZABEL drain removed on 11/11. Keppra was started for seizure prophylaxis. He was noted to have DONNIE and Nephrology was consulted. DONNIE was attributed due to ROXANNE and he was started on IVFs. . TTE was done which showed moderately calcified aortic valve with mobile echodensity. Blood cultures w/ NGTD. He tolerated extubation on 11/13. Course was complicated by AFib w/ RVR and Cardiology was consulted. Cardizem was started. He was to be downgraded to Medicine on 11/14 however downgrade was cancelled due to airway concerns. He was started on IV antibiotics for concerns of Klebsiella tracheobronchitis Repeat CTH were stable and ASA was started on 11/15. Respiratory status worsened and patient was placed on HFNC. Course was complicated by DVT of LUE brachial veins and Eliquis was started. CTH was repeated on Eliquis and remained stable. ID was consulted for leukocytosis and fever and patient's antibiotics were switched to Merrem. Upon infectious work up, incidental finding of infrarenal endograph, with reported increase in sac size compared to prior studies. Vascular was consulted, recommending no intervention and outpatient follow up. Patient now deemed appropriate for downgrade by Neuro-ICU to SDU.    #Traumatic ICH  - S/p hemicraniectomy   - Neurocheck Q2H  - Completed Keppra for seizure prophylaxis  - NSG following  - EEG on 11/18 with epileptic activity - discussed w/ Neuro-ICU, no clinical signs of seizure, recommending holding AEDs for now.   - MRI brain pending  - Repeat CTHs stable, Stat CTH for any neurological changes    Alzheimer's Dementia  - Continue Namenda    Hx of CAD, PAFib  - Continue BB, Diltiazem, ASA, Statin  - Started on Eliquis  - Now in NSR  - Cardiology recs appreciated    Aortic mobile echodensity  - Blood cultures with NGTD  - Cardiology recs appreciated  - No indication for WHIT at this time.    Acute Respiratory Failure in setting of TBI, Tracheobronchitis  - Resolved  - Monitor respiratory status    Fever, leukocytosis  - ID following  - On Merrem  - Leukocytosis improving  - Monitor WBC, fever curve.    Dysphagia   - SLP following, advanced to pureed/mod thick liquids  - Aspiration precautions  - NGT w/ tube feeds still in place due to poor nutrition with fluctuating lethargy     H/o Lt RCC s/p nephrectomy  DONNIE - improved  Hypernatremia - resolved  - Baseline Creat ~1   - Nephrology recs appreciated  - Avoid nephrotoxic medications  - Monitor BMP, correct elecolytes as needed    LUE DVT   - On Eliquis    ?Endoleak  - Vascular consult appreciated, no surgical intervention recommended  - Outpt follow up    #Thrombocytopenia  - S/p 1u Plt on 11/14  - Resolved    #VTE Ppx: Eliquis    DISPO: Downgrade to stepdown. Will likely need HOLLY on discharge.     Patient still legally  but  to wife. Has a GF. Patient elected daughters as proxy.

## 2023-11-24 NOTE — PROGRESS NOTE ADULT - ASSESSMENT
82y M with PMH HTN, CAD s/p stents, renal cell carcinoma status post left nephrectomy, bladder CA, BPH, CHF, LBBB, vertigo,  HLD, 5.5cm AAA without rupture post EVAR, paroxysmal A-fib on Eliquis, Plavix, and aspirin, early stage Alzheimer dementia transferred from [facility] s/p unwitnessed fall with head strike at home found by wife on floor at 8am. Patient brought to urgent care by wife and had 5 staples to posterior scalp but was instructed to go to nearest ED.  CT head at [place] showed R frontal IPH, SDH, SAH s/p decompressive craniectomy  ID called for worsening leukocytosis  Patient has been having intermittent fever during this admission, started on cefepime for klebsiella aerogenes tracheobronchitis, de-escalated to ceftriaxone due to concern for mental status change on cefepime. Noted with uptrending wbc concern for ongoing aspiration and also found to have DVT    Leukocytosis  Fever  Tracheobronchitis, concern for ongoing aspiration  Penicillin allergy-unknown  SDH  LUe DVT and cephalic vein superficial thrombosis  RUE cephalic vein superficial thrombosis        - intermittent fever, afebrile today  - leukocytosis up trending even prior to change cefepime-->ceftriaxone, now improving on meropenem  - UA from 11/17 is neg  - repeat BCX ngtd  - repeat sputum CX + yeast, will defer treatment for now   - f/u Mri head-d/w team clinically low suspicion for CNS infection or surgical site infection. d/w team pt with waxing and waning mental status  - CT C/Ap  reported endovascular repair with increase in size of aneurysmal sac up to 8cm suspicious for endoleak. consider ct with contrast, vascular f/u  - c/w meropenem 1g IV q12h  - initial TTE with ?  AV vegetation repeat TTE 11/21/23 nondiagnostic  - Trend Fever  - Trend Leukocytosis      Will Follow

## 2023-11-24 NOTE — PROGRESS NOTE ADULT - SUBJECTIVE AND OBJECTIVE BOX
[facility]                                                INFECTIOUS DISEASES  =======================================================                     Uriel Vivas#   Roman Gonzáles MD#   Shan Wilson MD*                           Jennie Huerta MD*   Stephany Segura MD*            Diplomates American Board of Internal Medicine & Infectious Diseases                  # [place] Office - Appt - Tel  [phone] Fax [phone]                * [place] Office - Appt - Tel [phone] Fax [phone]                                  Hospital Consult line:  [phone]  =======================================================      [id number]  DEUCE BALL       ID f/u- fever, leukocytosis, aspiration pneumonia  afebrile today  transferred out of icu    I have personally reviewed the labs and data; pertinent labs and data are listed in this note; please see below.   =======================================================  Past Medical & Surgical Hx:  =====================  PAST MEDICAL & SURGICAL HISTORY:  HTN  dx 2008      Bladder Cancer (ICD9 188.9)  TCC, dx       Hyperlipemia (ICD9 272.4)  dx       Lt Renal Neoplasm  left - s/p left nephrectomy      Hx antineoplastic chemotherapy (BCG )      Left bundle branch block      Vertigo      Heel spur, left      Abdominal aortic aneurysm (AAA)  5.5 cm      BPH (benign prostatic hyperplasia)      Renal mass, right  current - following with Dr Pamela SHEARER (coronary artery disease)      Bladder cancer, primary, with metastasis from bladder to other site  Left kidney      Acute renal failure, unspecified acute renal failure type      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)      Abdominal aortic aneurysm (AAA) without rupture      Congestive heart failure, unspecified chronicity, unspecified heart failure type      Hypertension      History of abdominal aortic aneurysm (AAA)      CAD (coronary artery disease)      Alzheimers disease      urethral Stent 2008  stent placement and removal      S/P Cystoscopy  bladder polyps removal multiple times (since ), 6/10 , 10/2011 & 10/17/2011, , 2012, last 13, 2013, 2014      S/P Tonsillectomy      History of Lt  Nephrectomy  6/10 - left      History of cystoscopy  2015      H/O abdominal aortic aneurysm repair      S/P AAA repair      History of nephrectomy  Left      Presence of stent in LAD coronary artery      History of heart artery stent  DIONICIO        Problem List:  ==========  HEALTH ISSUES - PROBLEM Dx:  Paroxysmal atrial fibrillation    DONNIE (acute kidney injury)    Abnormal echocardiogram    Endocarditis          Social Hx:  =======  no toxic habits currently    FAMILY HISTORY:  Family history of MI (myocardial infarction) (Father, Mother)  father  76y/o MI, mother  96y/o alzheimers    Family history of early CAD (Father, Mother)    no significant family history of immunosuppressive disorders in mother or father   =======================================================    REVIEW OF SYSTEMS:  CONSTITUTIONAL:  No Fever or chills  HEENT:  No diplopia or blurred vision.  No earache, sore throat or runny nose.  CARDIOVASCULAR:  No pressure, squeezing, strangling, tightness, heaviness or aching about the chest, neck, axilla or epigastrium.  RESPIRATORY:  No cough, shortness of breath  GASTROINTESTINAL:  No nausea, vomiting or diarrhea.  GENITOURINARY:  No dysuria, frequency or urgency. No Blood in urine  MUSCULOSKELETAL:  no joint aches, no muscle pain  SKIN:  No change in skin, hair or nails.  NEUROLOGIC:  No Headaches, seizures or weakness.  PSYCHIATRIC:  No disorder of thought or mood.  ENDOCRINE:  No heat or cold intolerance  HEMATOLOGICAL:  No easy bruising or bleeding.    =======================================================  Allergies    penicillin (Rash)  heparin (Other (Severe))  penicillin (Hives)  Cipro (Other)  Levaquin (Other)  heparin (Other)    Intolerances    Antibiotics:    Other medications:  acetylcysteine 20%  Inhalation 4 milliLiter(s) Inhalation every 6 hours  amantadine 100 milliGRAM(s) Oral <User Schedule>  aspirin  chewable 81 milliGRAM(s) Oral daily  atorvastatin 40 milliGRAM(s) Oral at bedtime  chlorhexidine 2% Cloths 1 Application(s) Topical <User Schedule>  diltiazem    Tablet 60 milliGRAM(s) Oral <User Schedule>  folic acid 1 milliGRAM(s) Oral daily  fondaparinux Injectable 1.2 milliGRAM(s) SubCutaneous <User Schedule>  levalbuterol Inhalation 1.25 milliGRAM(s) Inhalation every 6 hours  melatonin 3 milliGRAM(s) Oral at bedtime  metoprolol tartrate 25 milliGRAM(s) Oral <User Schedule>  Nephro-roma 1 Tablet(s) Oral daily  polyethylene glycol 3350 17 Gram(s) Oral daily  senna 2 Tablet(s) Oral at bedtime     cefepime  Injectable.   2000 milliGRAM(s) IV Push (23 @ 18:16)    cefepime  Injectable.   2000 milliGRAM(s) IV Push (11-15-23 @ 05:29)   2000 milliGRAM(s) IV Push (11-15-23 @ 17:39)   2000 milliGRAM(s) IV Push (23 @ 05:30)   2000 milliGRAM(s) IV Push (23 @ 17:10)   2000 milliGRAM(s) IV Push (23 @ 05:30)   2000 milliGRAM(s) IV Push (23 @ 17:08)   2000 milliGRAM(s) IV Push (23 @ 05:09)   2000 milliGRAM(s) IV Push (23 @ 18:04)   2000 milliGRAM(s) IV Push (23 @ 06:07)    cefTRIAXone Injectable.   1000 milliGRAM(s) IV Push (23 @ 14:03)   1000 milliGRAM(s) IV Push (23 @ 18:41)      ======================================================  Physical Exam:  ============  Vital Signs Last 24 Hrs  T(C): 37 (2023 12:10), Max: 37.3 (2023 04:40)  T(F): 98.6 (2023 12:10), Max: 99.2 (2023 04:40)  HR: 98 (2023 15:41) (89 - 140)  BP: 101/57 (2023 14:00) (101/57 - 164/86)  BP(mean): 105 (2023 08:00) (77 - 113)  RR: 18 (2023 14:00) (16 - 24)  SpO2: 98% (2023 15:41) (94% - 99%)    Parameters below as of 2023 15:41  Patient On (Oxygen Delivery Method): room air        General:  No acute distress. elderly male laying in bed, on o2, lethargic  HENT: rt cranial flap soft, staples intact  Neck: Supple, No lymphadenopathy.  Respiratory: Lungs are clear to auscultation, Respirations are non-labored.  Cardiovascular: Normal rate, Regular rhythm, s1 + s2 +mildred  Gastrointestinal: Soft, Non-tender, Non-distended, Normal bowel sounds.  Genitourinary: No costovertebral angle tenderness. hall in place  Integumentary: No rash. no phlebitis, LUE forearm skin tear  Neurologic:  lethargic, awake able to follow commands    =======================================================  Labs:                      Culture - Blood (collected 23 @ 16:19)  Source: .Blood Blood  Final Report (23 @ 23:00):    No growth at 5 days    Culture - Sputum (collected 23 @ 16:11)  Source: ET Tube ET Tube  Gram Stain (23 @ 11:50):    Numerous polymorphonuclear leukocytes per low power field    Few Squamous epithelial cells per low power field    Few Yeast like cells per oil power field    Numerous Gram Negative Rods per oil power field  Final Report (23 @ 08:57):    Numerous Klebsiella aerogenes (Previously Enterobacter)    Normal Respiratory Isabell present  Organism: Klebsiella aerogenes (Previously Enterobacter) (23 @ 08:57)  Organism: Klebsiella aerogenes (Previously Enterobacter) (23 @ 08:57)    Sensitivities:      Method Type: ANDERS      -  Amoxicillin/Clavulanic Acid: R >16/8      -  Ampicillin: R <=8 These ampicillin results predict results for amoxicillin      -  Ampicillin/Sulbactam: R <=4/2      -  Aztreonam: S <=4      -  Cefazolin: R >16      -  Cefepime: S <=2      -  Cefotaxime: S Enterobacter cloacae, Klebsiella aerogenes, and Citrobacter freundii may develop resistance during prolonged therapy.      -  Cefoxitin: R >16      -  Ceftazidime: S Enterobacter cloacae, Klebsiella aerogenes, and Citrobacter freundii may develop resistance during prolonged therapy.      -  Ceftriaxone: S <=1 Enterobacter cloacae, Klebsiella aerogenes, and Citrobacter freundii may develop resistance during prolonged therapy.      -  Ciprofloxacin: S <=0.25      -  Ertapenem: S <=0.5      -  Gentamicin: S <=2      -  Imipenem: S <=1      -  Levofloxacin: S <=0.5      -  Meropenem: S <=1      -  Piperacillin/Tazobactam: S <=8      -  Tobramycin: S <=2      -  Trimethoprim/Sulfamethoxazole: S <=0.5/9.5    Culture - Blood (collected 23 @ 15:50)  Source: .Blood Blood  Final Report (23 @ 23:00):    No growth at 5 days       SARS-CoV-2: NotDetec (11-15-23 @ 11:40)       < from: US Duplex Venous Upper Ext Complete, Bilateral (23 @ 22:33) >  IMPRESSION:    Right upper extremity:    No acute DVT of the right upper extremity. Superficial thrombosis of the   cephalic vein with indwelling line, image 1.57.    Left upper extremity:    New DVT of one of the left upper extremity brachial veins. Along the   thrombosed left brachial vein, there is a focal extraluminal density   which may represent a hematoma, CINE 1.21 images 161-164, which is   closely apposed to the left brachial artery. Contour lobulation of the   left brachial artery in this location is noted, possibly a small   pseudoaneurysm. Recommend left upper extremity arterial duplex ultrasound   for further evaluation.    Additional persistent left cephalic vein superficial venous thrombosis.    DEBRA Sosa from Neuro ICU was informed of these findings on 2023 at   12:22AM.    < end of copied text >      < end of copied text >      < from: TTE Echo Limited or F/U (23 @ 20:01) >    Summary:   1. This is a nondiagnostic study to evaluate aortic valve because of   poor windows, patient was agitated.      As recommended earlier, Consider WHIT to evaluate Aortic Vavle.    MD Keri Electronically signed on 2023 at 9:12:23 AM      < end of copied text >    < from: TTE Echo Complete w/o Contrast w/ Doppler (23 @ 08:58) >  Summary:   1. Endocardial visualization was enhanced with intravenous echo contrast.   2. Normal global left ventricular systolic function.   3. There is mild concentric left ventricular hypertrophy.   4. Left ventricular ejection fraction, by visual estimation, is 60 to   65%.   5. Spectral Doppler shows impaired relaxation pattern of left   ventricular myocardial filling (Grade I diastolic dysfunction).   6. Elevated mean left atrial pressure.   7. Mildly enlarged right ventricle.   8. Normal left atrial size.   9. Normal right atrialsize.  10. Moderate paradoxial low gradient aortic stenosis.  11. The Dimesionless Index value is .39.  12. Moderately calcified aortic valve with mobile echodensity poorly   visualized. May represent artifact vs mobile calcification vs vegetation.   Consider WHIT if clinically indicated.  13. Mild aortic regurgitation.  14. Moderate thickening and calcification of the anterior and posterior   mitral valve leaflets.  15. Mild tricuspid regurgitation.    AdamKu    < end of copied text >      < from: CT Abdomen and Pelvis No Cont (23 @ 03:01) >  IMPRESSION:  Endovascular repair of infrarenal abdominal aorta with increase in size   of the native aneurysm sac compared to CT 4/3/2018, measuring up to 8 cm.   Heterogeneous attenuation of the native aneurysm sac is suspicious for   endoleak. However, anterior and posterior embolization coils are also new   compared with the prior CT, possibly representing prior endoleak repair.   Correlate with patient history. Consider CT with intravenous contrast to   further characterization if clinically indicated.    Otherwise no acute pathology in the chest, abdomen, or pelvis.        --- End of Report ---    < end of copied text >

## 2023-11-25 LAB
ALBUMIN SERPL ELPH-MCNC: 3.6 G/DL — SIGNIFICANT CHANGE UP (ref 3.3–5.2)
ALBUMIN SERPL ELPH-MCNC: 3.6 G/DL — SIGNIFICANT CHANGE UP (ref 3.3–5.2)
ALP SERPL-CCNC: 107 U/L — SIGNIFICANT CHANGE UP (ref 40–120)
ALP SERPL-CCNC: 107 U/L — SIGNIFICANT CHANGE UP (ref 40–120)
ALT FLD-CCNC: 16 U/L — SIGNIFICANT CHANGE UP
ALT FLD-CCNC: 16 U/L — SIGNIFICANT CHANGE UP
ANION GAP SERPL CALC-SCNC: 17 MMOL/L — SIGNIFICANT CHANGE UP (ref 5–17)
ANION GAP SERPL CALC-SCNC: 17 MMOL/L — SIGNIFICANT CHANGE UP (ref 5–17)
AST SERPL-CCNC: 28 U/L — SIGNIFICANT CHANGE UP
AST SERPL-CCNC: 28 U/L — SIGNIFICANT CHANGE UP
BASOPHILS # BLD AUTO: 0.12 K/UL — SIGNIFICANT CHANGE UP (ref 0–0.2)
BASOPHILS # BLD AUTO: 0.12 K/UL — SIGNIFICANT CHANGE UP (ref 0–0.2)
BASOPHILS NFR BLD AUTO: 0.7 % — SIGNIFICANT CHANGE UP (ref 0–2)
BASOPHILS NFR BLD AUTO: 0.7 % — SIGNIFICANT CHANGE UP (ref 0–2)
BILIRUB SERPL-MCNC: 0.9 MG/DL — SIGNIFICANT CHANGE UP (ref 0.4–2)
BILIRUB SERPL-MCNC: 0.9 MG/DL — SIGNIFICANT CHANGE UP (ref 0.4–2)
BUN SERPL-MCNC: 39.7 MG/DL — HIGH (ref 8–20)
BUN SERPL-MCNC: 39.7 MG/DL — HIGH (ref 8–20)
CALCIUM SERPL-MCNC: 9.6 MG/DL — SIGNIFICANT CHANGE UP (ref 8.4–10.5)
CALCIUM SERPL-MCNC: 9.6 MG/DL — SIGNIFICANT CHANGE UP (ref 8.4–10.5)
CHLORIDE SERPL-SCNC: 103 MMOL/L — SIGNIFICANT CHANGE UP (ref 96–108)
CHLORIDE SERPL-SCNC: 103 MMOL/L — SIGNIFICANT CHANGE UP (ref 96–108)
CO2 SERPL-SCNC: 21 MMOL/L — LOW (ref 22–29)
CO2 SERPL-SCNC: 21 MMOL/L — LOW (ref 22–29)
CREAT SERPL-MCNC: 1.16 MG/DL — SIGNIFICANT CHANGE UP (ref 0.5–1.3)
CREAT SERPL-MCNC: 1.16 MG/DL — SIGNIFICANT CHANGE UP (ref 0.5–1.3)
EGFR: 63 ML/MIN/1.73M2 — SIGNIFICANT CHANGE UP
EGFR: 63 ML/MIN/1.73M2 — SIGNIFICANT CHANGE UP
EOSINOPHIL # BLD AUTO: 0.1 K/UL — SIGNIFICANT CHANGE UP (ref 0–0.5)
EOSINOPHIL # BLD AUTO: 0.1 K/UL — SIGNIFICANT CHANGE UP (ref 0–0.5)
EOSINOPHIL NFR BLD AUTO: 0.5 % — SIGNIFICANT CHANGE UP (ref 0–6)
EOSINOPHIL NFR BLD AUTO: 0.5 % — SIGNIFICANT CHANGE UP (ref 0–6)
GLUCOSE SERPL-MCNC: 118 MG/DL — HIGH (ref 70–99)
GLUCOSE SERPL-MCNC: 118 MG/DL — HIGH (ref 70–99)
HCT VFR BLD CALC: 35.9 % — LOW (ref 39–50)
HCT VFR BLD CALC: 35.9 % — LOW (ref 39–50)
HGB BLD-MCNC: 11.4 G/DL — LOW (ref 13–17)
HGB BLD-MCNC: 11.4 G/DL — LOW (ref 13–17)
IMM GRANULOCYTES NFR BLD AUTO: 0.7 % — SIGNIFICANT CHANGE UP (ref 0–0.9)
IMM GRANULOCYTES NFR BLD AUTO: 0.7 % — SIGNIFICANT CHANGE UP (ref 0–0.9)
LYMPHOCYTES # BLD AUTO: 1.84 K/UL — SIGNIFICANT CHANGE UP (ref 1–3.3)
LYMPHOCYTES # BLD AUTO: 1.84 K/UL — SIGNIFICANT CHANGE UP (ref 1–3.3)
LYMPHOCYTES # BLD AUTO: 10 % — LOW (ref 13–44)
LYMPHOCYTES # BLD AUTO: 10 % — LOW (ref 13–44)
MCHC RBC-ENTMCNC: 30.9 PG — SIGNIFICANT CHANGE UP (ref 27–34)
MCHC RBC-ENTMCNC: 30.9 PG — SIGNIFICANT CHANGE UP (ref 27–34)
MCHC RBC-ENTMCNC: 31.8 GM/DL — LOW (ref 32–36)
MCHC RBC-ENTMCNC: 31.8 GM/DL — LOW (ref 32–36)
MCV RBC AUTO: 97.3 FL — SIGNIFICANT CHANGE UP (ref 80–100)
MCV RBC AUTO: 97.3 FL — SIGNIFICANT CHANGE UP (ref 80–100)
MONOCYTES # BLD AUTO: 0.95 K/UL — HIGH (ref 0–0.9)
MONOCYTES # BLD AUTO: 0.95 K/UL — HIGH (ref 0–0.9)
MONOCYTES NFR BLD AUTO: 5.2 % — SIGNIFICANT CHANGE UP (ref 2–14)
MONOCYTES NFR BLD AUTO: 5.2 % — SIGNIFICANT CHANGE UP (ref 2–14)
NEUTROPHILS # BLD AUTO: 15.18 K/UL — HIGH (ref 1.8–7.4)
NEUTROPHILS # BLD AUTO: 15.18 K/UL — HIGH (ref 1.8–7.4)
NEUTROPHILS NFR BLD AUTO: 82.9 % — HIGH (ref 43–77)
NEUTROPHILS NFR BLD AUTO: 82.9 % — HIGH (ref 43–77)
PLATELET # BLD AUTO: 378 K/UL — SIGNIFICANT CHANGE UP (ref 150–400)
PLATELET # BLD AUTO: 378 K/UL — SIGNIFICANT CHANGE UP (ref 150–400)
POTASSIUM SERPL-MCNC: 5.2 MMOL/L — SIGNIFICANT CHANGE UP (ref 3.5–5.3)
POTASSIUM SERPL-MCNC: 5.2 MMOL/L — SIGNIFICANT CHANGE UP (ref 3.5–5.3)
POTASSIUM SERPL-SCNC: 5.2 MMOL/L — SIGNIFICANT CHANGE UP (ref 3.5–5.3)
POTASSIUM SERPL-SCNC: 5.2 MMOL/L — SIGNIFICANT CHANGE UP (ref 3.5–5.3)
PROT SERPL-MCNC: 8 G/DL — SIGNIFICANT CHANGE UP (ref 6.6–8.7)
PROT SERPL-MCNC: 8 G/DL — SIGNIFICANT CHANGE UP (ref 6.6–8.7)
RBC # BLD: 3.69 M/UL — LOW (ref 4.2–5.8)
RBC # BLD: 3.69 M/UL — LOW (ref 4.2–5.8)
RBC # FLD: 13.2 % — SIGNIFICANT CHANGE UP (ref 10.3–14.5)
RBC # FLD: 13.2 % — SIGNIFICANT CHANGE UP (ref 10.3–14.5)
SODIUM SERPL-SCNC: 141 MMOL/L — SIGNIFICANT CHANGE UP (ref 135–145)
SODIUM SERPL-SCNC: 141 MMOL/L — SIGNIFICANT CHANGE UP (ref 135–145)
WBC # BLD: 18.32 K/UL — HIGH (ref 3.8–10.5)
WBC # BLD: 18.32 K/UL — HIGH (ref 3.8–10.5)
WBC # FLD AUTO: 18.32 K/UL — HIGH (ref 3.8–10.5)
WBC # FLD AUTO: 18.32 K/UL — HIGH (ref 3.8–10.5)

## 2023-11-25 PROCEDURE — 99223 1ST HOSP IP/OBS HIGH 75: CPT

## 2023-11-25 PROCEDURE — 99233 SBSQ HOSP IP/OBS HIGH 50: CPT

## 2023-11-25 RX ORDER — ATORVASTATIN CALCIUM 80 MG/1
80 TABLET, FILM COATED ORAL AT BEDTIME
Refills: 0 | Status: DISCONTINUED | OUTPATIENT
Start: 2023-11-25 | End: 2023-12-07

## 2023-11-25 RX ADMIN — Medication 60 MILLIGRAM(S): at 18:52

## 2023-11-25 RX ADMIN — LEVALBUTEROL 1.25 MILLIGRAM(S): 1.25 SOLUTION, CONCENTRATE RESPIRATORY (INHALATION) at 08:01

## 2023-11-25 RX ADMIN — APIXABAN 5 MILLIGRAM(S): 2.5 TABLET, FILM COATED ORAL at 10:10

## 2023-11-25 RX ADMIN — MEROPENEM 1000 MILLIGRAM(S): 1 INJECTION INTRAVENOUS at 06:04

## 2023-11-25 RX ADMIN — Medication 1 TABLET(S): at 12:15

## 2023-11-25 RX ADMIN — Medication 100 MILLIGRAM(S): at 06:05

## 2023-11-25 RX ADMIN — Medication 60 MILLIGRAM(S): at 06:50

## 2023-11-25 RX ADMIN — Medication 25 MILLIGRAM(S): at 03:12

## 2023-11-25 RX ADMIN — SENNA PLUS 2 TABLET(S): 8.6 TABLET ORAL at 21:38

## 2023-11-25 RX ADMIN — ATORVASTATIN CALCIUM 80 MILLIGRAM(S): 80 TABLET, FILM COATED ORAL at 21:38

## 2023-11-25 RX ADMIN — Medication 25 MILLIGRAM(S): at 12:15

## 2023-11-25 RX ADMIN — POLYETHYLENE GLYCOL 3350 17 GRAM(S): 17 POWDER, FOR SOLUTION ORAL at 12:15

## 2023-11-25 RX ADMIN — Medication 3 MILLIGRAM(S): at 21:38

## 2023-11-25 RX ADMIN — Medication 1 MILLIGRAM(S): at 12:15

## 2023-11-25 RX ADMIN — LEVALBUTEROL 1.25 MILLIGRAM(S): 1.25 SOLUTION, CONCENTRATE RESPIRATORY (INHALATION) at 03:52

## 2023-11-25 RX ADMIN — Medication 25 MILLIGRAM(S): at 23:24

## 2023-11-25 RX ADMIN — LEVALBUTEROL 1.25 MILLIGRAM(S): 1.25 SOLUTION, CONCENTRATE RESPIRATORY (INHALATION) at 20:43

## 2023-11-25 RX ADMIN — LEVALBUTEROL 1.25 MILLIGRAM(S): 1.25 SOLUTION, CONCENTRATE RESPIRATORY (INHALATION) at 14:45

## 2023-11-25 RX ADMIN — APIXABAN 5 MILLIGRAM(S): 2.5 TABLET, FILM COATED ORAL at 21:38

## 2023-11-25 RX ADMIN — CHLORHEXIDINE GLUCONATE 1 APPLICATION(S): 213 SOLUTION TOPICAL at 06:05

## 2023-11-25 RX ADMIN — Medication 81 MILLIGRAM(S): at 12:15

## 2023-11-25 RX ADMIN — MEROPENEM 1000 MILLIGRAM(S): 1 INJECTION INTRAVENOUS at 17:47

## 2023-11-25 NOTE — CONSULT NOTE ADULT - ASSESSMENT
ASSESSMENT: The patient is a 83y Male who presented with fall and SDH/ICH s/p craniotomy and evacuation.  He has history of dementia.  He has been recovering from surgery and per his partner cranioplasty is planned.  He is on apixaban for clots in his arm per his partner.  Neurology is called today to evaluate secondary to stroke found on MRI brain.  This was found incidentally on MRI that was done to evaluate for infection.  He has b/l strokes suggests possible embolic mechanism and echocardiogram suggested mobile echodensity on aortic valve      NEURO: Continue close monitoring for neurologic deterioration, neuro checks q2h, LDL at goal less than 70 (LDL=58),   Physical therapy/OT/Speech eval/treatment.     ANTITHROMBOTIC THERAPY: ASA 81, apixaban    PULMONARY: CXR clear, protecting airway, saturating well     CARDIOVASCULAR: check WHIT given mobile echodensity on aortic valve and strokes in multiple arterial territories , cardiac monitoring                              SBP goal: normotension    GASTROINTESTINAL:  dysphagia screen       Diet:     RENAL: BUN/Cr 39.7/1.16      Na Goal: Greater than 135     [place]:    HEMATOLOGY: H/H stable, Platelets normal      DVT ppx: Heparin s.c [] LMWH []     ID: afebrile, no leukocytosis     OTHER:      DISPOSITION: Rehab or home depending on PT eval once stable and workup is complete      CORE MEASURES:        Admission NIHSS:      Tenecteplase : [] YES [X] NO      LDL/HDL/A1C: 58/31/5.6%     Depression Screen:      Statin Therapy: atorvastatin 80     Dysphagia Screen: [] PASS [] FAIL     Smoking [] YES [x] NO      Afib [] YES [x] NO     Stroke Education [] YES [] NO    Obtain screening lower extremity venous ultrasound in patients who meet 1 or more of the following criteria as patient is high risk for DVT/PE on admission:   [] History of DVT/PE  []Hypercoagulable states (Factor V Leiden, Cancer, OCP, etc. )  []Prolonged immobility (hemiplegia/hemiparesis/post operative or any other extended immobilization)  [] Transferred from outside facility (Rehab or Long term care)  [] Age </= to 50    will follow with you    Jaren Moreno MD PhD   [id number]

## 2023-11-25 NOTE — PROGRESS NOTE ADULT - ASSESSMENT
Patient is a 84 yo M w/ PMH of CAD s/p stents, HTN, Renal Cell CA s/p L nephrectomy, Bladder CA, BPH, CHF, LBBB, Vertigo, HLD, 5.5cm AAA without rupture post EVAR, paroxysmal A-fib on Eliquis, Plavix, and aspirin, early stage Alzheimer dementia who presented to [facility] s/p fall and found to have  R frontal IPH, SDH, SAH. He was transferred to [facility] Neuro-ICU for further care. NSG was consulted. IZABEL drain was placed. He underwent R hemicraniectomy on 11/10. He had his IZABEL drain removed on 11/11. Keppra was started for seizure prophylaxis. He was noted to have DONNIE and Nephrology was consulted. DONNIE was attributed due to ROXANNE and he was started on IVFs. . TTE was done which showed moderately calcified aortic valve with mobile echodensity. Blood cultures w/ NGTD. He tolerated extubation on 11/13. Course was complicated by AFib w/ RVR and Cardiology was consulted. Cardizem was started. He was to be downgraded to Medicine on 11/14 however downgrade was cancelled due to airway concerns. He was started on IV antibiotics for concerns of Klebsiella tracheobronchitis Repeat CTH were stable and ASA was started on 11/15. Respiratory status worsened and patient was placed on HFNC. Course was complicated by DVT of LUE brachial veins and Eliquis was started. CTH was repeated on Eliquis and remained stable. ID was consulted for leukocytosis and fever and patient's antibiotics were switched to Merrem. Upon infectious work up, incidental finding of infrarenal endograph, with reported increase in sac size compared to prior studies. Vascular was consulted, recommending no intervention and outpatient follow up. Patient now deemed appropriate for downgrade by Neuro-ICU to SDU.    Traumatic ICH  - S/p hemicraniectomy   - Neurocheck Q2H  - Completed Keppra for seizure prophylaxis  - NSG following  - EEG on 11/18 with epileptic activity - discussed w/ Neuro-ICU, no clinical signs of seizure, recommending holding AEDs for now.   - MRI brain result noted  - Repeat CTHs stable, Stat CTH for any neurological changes    Acute CVA  - noted on MRI from 11/13  - TTE result with ?Mass vs. artifact  - ASA, Eliquis and Lipitor  - carotid doppler pending  - neuro consulted, pending recs    Alzheimer's Dementia  - Continue Namenda    Hx of CAD, PAFib  - Continue BB, Diltiazem, ASA, Statin  - Started on Eliquis  - Now in NSR  - Cardiology recs appreciated    Aortic mobile echodensity  - Blood cultures with NGTD  - Cardiology recs appreciated  - f/u card recs about WHIT, due to MRI finding of multiple infarct   - Per cards discussion with daughter, will think about it, f/u daughter's decision about WHIT, likely sometimes next week unlikely Monday per cards)    Acute Respiratory Failure in setting of TBI, Tracheobronchitis  - Resolved  - Monitor respiratory status    Fever, leukocytosis  - ID following  - On Merrem  - Leukocytosis improving  - Monitor WBC, fever curve.    Dysphagia   - SLP following, advanced to pureed/mod thick liquids  - Aspiration precautions  - remove NGT    H/o Lt RCC s/p nephrectomy  DONNIE - improved  Hypernatremia - resolved  - Baseline Creat ~1   - Nephrology recs appreciated  - Avoid nephrotoxic medications  - Monitor BMP, correct elecolytes as needed    LUE DVT   - On Eliquis    ?Endoleak  - Vascular consult appreciated, no surgical intervention recommended  - Outpt follow up    #Thrombocytopenia  - S/p 1u Plt on 11/14  - Resolved    #VTE Ppx: Eliquis    DISPO: Downgrade to stepdown. Will likely need HOLLY on discharge     Patient still legally  but  to wife. Has a GF. Patient elected daughters as proxy.

## 2023-11-25 NOTE — PROGRESS NOTE ADULT - SUBJECTIVE AND OBJECTIVE BOX
Jean Gonzalez M.D.    Patient is a 83y old  Male who presents with a chief complaint of s/p unwitnessed fall with head strike (25 Nov 2023 12:26)      SUBJECTIVE / OVERNIGHT EVENTS: no concerns.     Patient denies chest pain, SOB, abd pain, N/V, fever, chills, dysuria or any other complaints. All remainder ROS negative.     MEDICATIONS  (STANDING):  amantadine 100 milliGRAM(s) Oral <User Schedule>  apixaban 5 milliGRAM(s) Oral every 12 hours  aspirin  chewable 81 milliGRAM(s) Oral daily  atorvastatin 80 milliGRAM(s) Oral at bedtime  chlorhexidine 2% Cloths 1 Application(s) Topical <User Schedule>  diltiazem    Tablet 60 milliGRAM(s) Oral <User Schedule>  folic acid 1 milliGRAM(s) Oral daily  levalbuterol Inhalation 1.25 milliGRAM(s) Inhalation every 6 hours  melatonin 3 milliGRAM(s) Oral at bedtime  meropenem Injectable 1000 milliGRAM(s) IV Push every 12 hours  meropenem Injectable      metoprolol tartrate 25 milliGRAM(s) Oral <User Schedule>  Nephro-roma 1 Tablet(s) Oral daily  polyethylene glycol 3350 17 Gram(s) Oral daily  senna 2 Tablet(s) Oral at bedtime    MEDICATIONS  (PRN):  acetaminophen   Oral Liquid .. 800 milliGRAM(s) Oral every 6 hours PRN Temp greater or equal to 38C (100.4F)  hydrALAZINE Injectable 10 milliGRAM(s) IV Push every 2 hours PRN SBP>160  labetalol Injectable 10 milliGRAM(s) IV Push every 2 hours PRN SBP>160      I&O's Summary    24 Nov 2023 07:01  -  25 Nov 2023 07:00  --------------------------------------------------------  IN: 0 mL / OUT: 680 mL / NET: -680 mL        PHYSICAL EXAM:  Vital Signs Last 24 Hrs  T(C): 37.1 (25 Nov 2023 12:00), Max: 37.1 (25 Nov 2023 04:09)  T(F): 98.7 (25 Nov 2023 12:00), Max: 98.7 (25 Nov 2023 04:09)  HR: 92 (25 Nov 2023 14:45) (86 - 113)  BP: 133/66 (25 Nov 2023 14:00) (116/59 - 159/82)  BP(mean): 79 (25 Nov 2023 06:00) (71 - 98)  RR: 16 (25 Nov 2023 14:00) (14 - 19)  SpO2: 96% (25 Nov 2023 14:45) (96% - 99%)    Parameters below as of 25 Nov 2023 14:45  Patient On (Oxygen Delivery Method): room air      CONSTITUTIONAL: NAD, lethargic  RESPIRATORY: secretions noted, somewhat course b/s  CARDIOVASCULAR: Regular rate and rhythm, no LE edema   ABDOMEN: Nontender to palpation, normoactive bowel sounds  PSYCH: A+O x3; affect appropriate  NEUROLOGY: moving all extremities, but overall poor effort     LABS:                        11.4   18.32 )-----------( 378      ( 25 Nov 2023 06:20 )             35.9     11-25    141  |  103  |  39.7<H>  ----------------------------<  118<H>  5.2   |  21.0<L>  |  1.16    Ca    9.6      25 Nov 2023 06:20    TPro  8.0  /  Alb  3.6  /  TBili  0.9  /  DBili  x   /  AST  28  /  ALT  16  /  AlkPhos  107  11-25          Urinalysis Basic - ( 25 Nov 2023 06:20 )    Color: x / Appearance: x / SG: x / pH: x  Gluc: 118 mg/dL / Ketone: x  / Bili: x / Urobili: x   Blood: x / Protein: x / Nitrite: x   Leuk Esterase: x / RBC: x / WBC x   Sq Epi: x / Non Sq Epi: x / Bacteria: x        CAPILLARY BLOOD GLUCOSE          RADIOLOGY & ADDITIONAL TESTS:  Results Reviewed:   Imaging Personally Reviewed:  Electrocardiogram Personally Reviewed:    < from: MR Head No Cont (11.13.23 @ 21:36) >  IMPRESSION:   RIGHT frontoparietal craniectomy with underlying   intracranial hemorrhage and edema within the RIGHT frontal lobe.   Overlying small RIGHT subdural hemorrhage is also unchanged. Scant   hemorrhage in the dependent portions of the BILATERAL lateral ventricles   and minimal subarachnoid hemorrhage seen in the BILATERAL frontal and   parietal lobes. Moderate periventricular and deep white matter ischemia   is noted. Encephalomalacia and gliosis seen in the anterior frontal lobes   bilaterally.Tiny acute lacunar infarction in the LEFT cerebellum and   tiny acute cortical infarction in the RIGHT parietal lobe. Restricted   diffusion also noted about the surgical cavity suggesting infarcted   parenchyma.    < end of copied text >

## 2023-11-25 NOTE — CONSULT NOTE ADULT - SUBJECTIVE AND OBJECTIVE BOX
[facility]                                     Neurology at [place]                                 Josh West, & Viral                                  [address]                                        [place], [place], [address]                                             [phone]    CC: stroke  HPI:  The patient is a 83y Male who presented with fall and SDH/ICH s/p craniotomy and evacuation.  He has history of dementia.  He has been recovering from surgery and per his partner cranioplasty is planned.  He is on apixaban for clots in his arm per his partner.  Neurology is called today to evaluate secondary to stroke found on MRI brain.    PAST MEDICAL & SURGICAL HISTORY:  HTN  dx       Bladder Cancer (ICD9 188.9)  TCC, dx       Hyperlipemia (ICD9 272.4)  dx       Lt Renal Neoplasm  left - s/p left nephrectomy      Hx antineoplastic chemotherapy (BCG )      Left bundle branch block      Vertigo      Heel spur, left      Abdominal aortic aneurysm (AAA)  5.5 cm      BPH (benign prostatic hyperplasia)      Renal mass, right  current - following with Dr Santiago      CAD (coronary artery disease)      Bladder cancer, primary, with metastasis from bladder to other site  Left kidney      Acute renal failure, unspecified acute renal failure type      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)      Abdominal aortic aneurysm (AAA) without rupture      Congestive heart failure, unspecified chronicity, unspecified heart failure type      Hypertension      History of abdominal aortic aneurysm (AAA)      CAD (coronary artery disease)      Alzheimers disease      urethral Stent 2008  stent placement and removal      S/P Cystoscopy  bladder polyps removal multiple times (since ), 6/10 , 10/2011 & 10/17/2011, , 2012, last 13, 2013, 2014      S/P Tonsillectomy      History of Lt  Nephrectomy  6/10 - left      History of cystoscopy  2015      H/O abdominal aortic aneurysm repair      S/P AAA repair      History of nephrectomy  Left      Presence of stent in LAD coronary artery      History of heart artery stent  DIONICIO      MEDICATIONS  (STANDING):  amantadine 100 milliGRAM(s) Oral <User Schedule>  apixaban 5 milliGRAM(s) Oral every 12 hours  aspirin  chewable 81 milliGRAM(s) Oral daily  atorvastatin 80 milliGRAM(s) Oral at bedtime  chlorhexidine 2% Cloths 1 Application(s) Topical <User Schedule>  diltiazem    Tablet 60 milliGRAM(s) Oral <User Schedule>  folic acid 1 milliGRAM(s) Oral daily  levalbuterol Inhalation 1.25 milliGRAM(s) Inhalation every 6 hours  melatonin 3 milliGRAM(s) Oral at bedtime  meropenem Injectable 1000 milliGRAM(s) IV Push every 12 hours  meropenem Injectable      metoprolol tartrate 25 milliGRAM(s) Oral <User Schedule>  Nephro-roma 1 Tablet(s) Oral daily  polyethylene glycol 3350 17 Gram(s) Oral daily  senna 2 Tablet(s) Oral at bedtime    MEDICATIONS  (PRN):  acetaminophen   Oral Liquid .. 800 milliGRAM(s) Oral every 6 hours PRN Temp greater or equal to 38C (100.4F)  hydrALAZINE Injectable 10 milliGRAM(s) IV Push every 2 hours PRN SBP>160  labetalol Injectable 10 milliGRAM(s) IV Push every 2 hours PRN SBP>160      Allergies    penicillin (Rash)  heparin (Other (Severe))  penicillin (Hives)  Cipro (Other)  Levaquin (Other)  heparin (Other)    Intolerances        SOCIAL HISTORY:  no tob,   no alcohol   no drugs    FAMILY HISTORY:  Family history of MI (myocardial infarction) (Father, Mother)  father  74y/o MI, mother  94y/o alzheimers    Family history of early CAD (Father, Mother)    ROS: 14 point ROS negative other than what is present in HPI or below    Vital Signs Last 24 Hrs  T(C): 37.1 (2023 12:00), Max: 37.1 (2023 04:09)  T(F): 98.7 (2023 12:00), Max: 98.7 (2023 04:09)  HR: 98 (2023 16:00) (86 - 113)  BP: 103/58 (2023 16:00) (103/58 - 159/82)  BP(mean): 79 (2023 06:00) (71 - 98)  RR: 14 (2023 16:00) (14 - 19)  SpO2: 96% (2023 16:00) (96% - 99%)    Parameters below as of 2023 16:00  Patient On (Oxygen Delivery Method): room air      General: NAD    Detailed Neurologic Exam:    Mental status: The patient is awake and alert and has diminshed attention span.  The patient is oriented self and hospital.  The patient is able to name objects, follow commands, repeat sentences. He is hypophonic    Cranial nerves: Pupils equal and react symmetrically to light. There is no left sided blink to threat. Extraocular motion testing reveals right gaze preference. There is no ptosis. Facial sensation is intact. Facial musculature is asymmetric mild left central weakness.     Motor: There is normal bulk and tone.  There is no tremor.  Strength is 5/5 in the right arm and leg.   left hemiparesis    Sensation: left neglect    Cerebellar: There is no dysmetria on finger to nose testing on rioght, left side limited by weakness    Gait : deferred    LABS:                         11.4   18.32 )-----------( 378      ( 2023 06:20 )             35.9           141  |  103  |  39.7<H>  ----------------------------<  118<H>  5.2   |  21.0<L>  |  1.16    Ca    9.6      2023 06:20    TPro  8.0  /  Alb  3.6  /  TBili  0.9  /  DBili  x   /  AST  28  /  ALT  16  /  AlkPhos  107      Lipid Profile (23 @ 14:00)    Cholesterol: 125 mg/dL   Triglycerides, Serum: 181 mg/dL   HDL Cholesterol: 31 mg/dL   Non HDL Cholesterol: 94:    LDL Cholesterol Calculated: 58 mg/dL    A1C with Estimated Average Glucose (23 @ 14:00)    A1C with Estimated Average Glucose Result: 5.6 %   Estimated Average Glucose: 114 mg/dL          RADIOLOGY & ADDITIONAL STUDIES (independently reviewed unless otherwise noted):  TTE Echo Complete w/o Contrast w/ Doppler (23 @ 08:58)   Summary:   1. Endocardial visualization was enhanced with intravenous echo contrast.   2. Normal global left ventricular systolic function.   3. There is mild concentric left ventricular hypertrophy.   4. Left ventricular ejection fraction, by visual estimation, is 60 to   65%.   5. Spectral Doppler shows impaired relaxation pattern of left   ventricular myocardial filling (Grade I diastolic dysfunction).   6. Elevated mean left atrial pressure.   7. Mildly enlarged right ventricle.   8. Normal left atrial size.   9. Normal right atrialsize.  10. Moderate paradoxial low gradient aortic stenosis.  11. The Dimesionless Index value is .39.  12. Moderately calcified aortic valve with mobile echodensity poorly   visualized. May represent artifact vs mobile calcification vs vegetation.   Consider WHIT if clinically indicated.  13. Mild aortic regurgitation.  14. Moderate thickening and calcification of the anterior and posterior   mitral valve leaflets.  15. Mild tricuspid regurgitation.    TTE Echo Limited or F/U (23 @ 20:01)   Summary:   1. This is a nondiagnostic study to evaluate aortic valve because of   poor windows, patient was agitated.      As recommended earlier, Consider WHIT to evaluate Aortic Vavle.            MR Head No Cont (23 @ 21:36)   IMPRESSION:   RIGHT frontoparietal craniectomy with underlying   intracranial hemorrhage and edema within the RIGHT frontal lobe.   Overlying small RIGHT subdural hemorrhage is also unchanged. Scant   hemorrhage in the dependent portions of the BILATERAL lateral ventricles   and minimal subarachnoid hemorrhage seen in the BILATERAL frontal and   parietal lobes. Moderate periventricular and deep white matter ischemia   is noted. Encephalomalacia and gliosis seen in the anterior frontal lobes   bilaterally.Tiny acute lacunar infarction in the LEFT cerebellum and   tiny acute cortical infarction in the RIGHT parietal lobe. Restricted   diffusion also noted about the surgical cavity suggesting infarcted   parenchyma.

## 2023-11-25 NOTE — CHART NOTE - NSCHARTNOTEFT_GEN_A_CORE
Per neurology recs, recommending a WHIT to r/o PFO. Spoke with patient's daughter, Jennifer ([phone]) regarding WHIT and indications for WHIT. Per daughter, she was questioning whether a WHIT was performed or not. Per review of outpatient chart, no WHIT was performed. Will re-assess on Monday and call daughter back to see if she is agreeable for WHIT for possibly Tuesday.

## 2023-11-25 NOTE — PROGRESS NOTE ADULT - ASSESSMENT
83y M with PMH HTN, CAD s/p stents, renal cell carcinoma status post left nephrectomy, bladder CA, BPH, CHF, LBBB, vertigo,  HLD, 5.5cm AAA without rupture post EVAR, paroxysmal A-fib on Eliquis, Plavix, and aspirin, early stage Alzheimer dementia transferred from [facility] 11/10/23 s/p unwitnessed fall with head strike at home found by wife on floor at 8am. CT head at [place] showed R frontal IPH, SDH, SAH at 9:00. Patient is now POD#15 s/p R craniectomy for ICH on 11/10/23 by Dr. Larios.    Plan:  -D/w attending Dr. Larios   -Q4hr neuro checks   -Pain control PRN, avoid oversedation   -STAT CTH for any decline in neuro exam   -Helmet to be worn when OOB   -Cranioplasty planning, flap remains soft   -Completed course of keppra for seizure ppx   -Puree diet with moderately thick liquids   -ID following, appreciate recommendations   -Continue Eliquis 5mg BID   -Further medical management/supportive care per primary team

## 2023-11-25 NOTE — PROGRESS NOTE ADULT - SUBJECTIVE AND OBJECTIVE BOX
HPI:  82y M with PMH HTN, CAD s/p stents, renal cell carcinoma status post left nephrectomy, bladder CA, BPH, CHF, LBBB, vertigo,  HLD, 5.5cm AAA without rupture post EVAR, paroxysmal A-fib on Eliquis, Plavix, and aspirin, early stage Alzheimer dementia transferred from [facility] s/p unwitnessed fall with head strike at home found by wife on floor at 8am. Patient brought to urgent care by wife and had 5 staples to posterior scalp but was instructed to go to nearest ED.  CT head at [place] showed R frontal IPH, SDH, SAH at 9:00. CTA stroke protocol not performed at [facility]. Patient BIB on 6L NC.  Pt GCS 15 on arrival, A&Ox2 on arrival. After initial assessment, patient noted to be vomiting enroute to CT scanner.    (10 Nov 2023 11:04)      INTERVAL HPI/OVERNIGHT EVENTS:  83 year old male seen and examined by neurosurgery team, sitting comfortably in bed. Patient is now POD#15 s/p R craniectomy for ICH on 11/10/23. Patient minimally participates in exam, does not express any active complaints at this time. No acute overnight events reported. RN reports that patient able to consume meal this AM.     Vital Signs Last 24 Hrs  T(C): 37.1 (25 Nov 2023 10:00), Max: 37.1 (25 Nov 2023 04:09)  T(F): 98.7 (25 Nov 2023 10:00), Max: 98.7 (25 Nov 2023 04:09)  HR: 109 (25 Nov 2023 12:00) (90 - 113)  BP: 124/98 (25 Nov 2023 12:00) (101/57 - 159/82)  BP(mean): 79 (25 Nov 2023 06:00) (71 - 98)  RR: 14 (25 Nov 2023 12:00) (14 - 19)  SpO2: 98% (25 Nov 2023 12:00) (97% - 99%)    Parameters below as of 25 Nov 2023 12:00  Patient On (Oxygen Delivery Method): room air    PHYSICAL EXAM:  GENERAL: Lethargic, appears stated age   HEAD: S/p R craniectomy, flap soft, no active drainage/bleeding noted from incision site   JOEY COMA SCORE: E-3 V-2 M-5 = 10  MENTAL STATUS: AAO x0 - but not responding appropriately to questioning; Opens eyes to voice; Elicits incomphrensible sounds but is not conversant; Intermittently following commands  CRANIAL NERVES: PERRL. EOMI without nystagmus. Face grossly symmetric. Hearing grossly intact, speech unable to fully assess.   MOTOR: RUE 5/5; LUE 1/5; RLE WD; LLE weaker WD   SENSATION: Grossly intact to noxious stimuli in all extremities      LABS:                        11.4   18.32 )-----------( 378      ( 25 Nov 2023 06:20 )             35.9     11-25    141  |  103  |  39.7<H>  ----------------------------<  118<H>  5.2   |  21.0<L>  |  1.16    Ca    9.6      25 Nov 2023 06:20    TPro  8.0  /  Alb  3.6  /  TBili  0.9  /  DBili  x   /  AST  28  /  ALT  16  /  AlkPhos  107  11-25      Urinalysis Basic - ( 25 Nov 2023 06:20 )    Color: x / Appearance: x / SG: x / pH: x  Gluc: 118 mg/dL / Ketone: x  / Bili: x / Urobili: x   Blood: x / Protein: x / Nitrite: x   Leuk Esterase: x / RBC: x / WBC x   Sq Epi: x / Non Sq Epi: x / Bacteria: x        11-24 @ 07:01  -  11-25 @ 07:00  --------------------------------------------------------  IN: 0 mL / OUT: 680 mL / NET: -680 mL        RADIOLOGY & ADDITIONAL TESTS:  CT Head No Cont (11.24.23 @ 14:12):  IMPRESSION:  No change since 11/23/2023. Stereotactic protocol for cranioplasty.    CT Head No Cont (11.23.23 @ 02:24):  IMPRESSION:   Stable multicompartment intracranial hemorrhages. Stable   right-sided subdural collection.    US Duplex Arterial Upper Ext Ltd, Left (11.22.23 @ 11:27):  IMPRESSION:  No pseudoaneurysm arising from the left brachial artery is identified.  The left radial artery in the distal forearm is thrombosed.    CT Head No Cont (11.22.23 @ 09:17):  IMPRESSION:   Mixed attenuating subdural hematoma again seen with some   expected postoperative changes  Evolving area of parenchymal hemorrhage involving the right frontal   region.

## 2023-11-25 NOTE — PROGRESS NOTE ADULT - NS ATTEND AMEND GEN_ALL_CORE FT
Neurosurgery Attending Attestation:    Patient seen and examined at bedside. Agree with plan and note as documented above.    82 y/o M s/p R craniectomy for ICH evacuation 11/10/2023, oriented to self, not hospital or year, FC in RUE and RLE. Trace movement in LUE and LLE. Plan for cranioplasty    -Marty Larios MD.

## 2023-11-26 ENCOUNTER — TRANSCRIPTION ENCOUNTER (OUTPATIENT)
Age: 83
End: 2023-11-26

## 2023-11-26 LAB
ANION GAP SERPL CALC-SCNC: 18 MMOL/L — HIGH (ref 5–17)
ANION GAP SERPL CALC-SCNC: 18 MMOL/L — HIGH (ref 5–17)
APPEARANCE UR: CLEAR — SIGNIFICANT CHANGE UP
APPEARANCE UR: CLEAR — SIGNIFICANT CHANGE UP
BACTERIA # UR AUTO: NEGATIVE /HPF — SIGNIFICANT CHANGE UP
BACTERIA # UR AUTO: NEGATIVE /HPF — SIGNIFICANT CHANGE UP
BILIRUB UR-MCNC: NEGATIVE — SIGNIFICANT CHANGE UP
BILIRUB UR-MCNC: NEGATIVE — SIGNIFICANT CHANGE UP
BUN SERPL-MCNC: 55.9 MG/DL — HIGH (ref 8–20)
BUN SERPL-MCNC: 55.9 MG/DL — HIGH (ref 8–20)
CALCIUM SERPL-MCNC: 9.1 MG/DL — SIGNIFICANT CHANGE UP (ref 8.4–10.5)
CALCIUM SERPL-MCNC: 9.1 MG/DL — SIGNIFICANT CHANGE UP (ref 8.4–10.5)
CAST: 2 /LPF — SIGNIFICANT CHANGE UP (ref 0–4)
CAST: 2 /LPF — SIGNIFICANT CHANGE UP (ref 0–4)
CHLORIDE SERPL-SCNC: 104 MMOL/L — SIGNIFICANT CHANGE UP (ref 96–108)
CHLORIDE SERPL-SCNC: 104 MMOL/L — SIGNIFICANT CHANGE UP (ref 96–108)
CO2 SERPL-SCNC: 20 MMOL/L — LOW (ref 22–29)
CO2 SERPL-SCNC: 20 MMOL/L — LOW (ref 22–29)
COLOR SPEC: YELLOW — SIGNIFICANT CHANGE UP
COLOR SPEC: YELLOW — SIGNIFICANT CHANGE UP
CREAT SERPL-MCNC: 2.41 MG/DL — HIGH (ref 0.5–1.3)
CREAT SERPL-MCNC: 2.41 MG/DL — HIGH (ref 0.5–1.3)
CULTURE RESULTS: ABNORMAL
CULTURE RESULTS: ABNORMAL
DIFF PNL FLD: NEGATIVE — SIGNIFICANT CHANGE UP
DIFF PNL FLD: NEGATIVE — SIGNIFICANT CHANGE UP
EGFR: 26 ML/MIN/1.73M2 — LOW
EGFR: 26 ML/MIN/1.73M2 — LOW
GLUCOSE BLDC GLUCOMTR-MCNC: 132 MG/DL — HIGH (ref 70–99)
GLUCOSE BLDC GLUCOMTR-MCNC: 132 MG/DL — HIGH (ref 70–99)
GLUCOSE SERPL-MCNC: 120 MG/DL — HIGH (ref 70–99)
GLUCOSE SERPL-MCNC: 120 MG/DL — HIGH (ref 70–99)
GLUCOSE UR QL: NEGATIVE MG/DL — SIGNIFICANT CHANGE UP
GLUCOSE UR QL: NEGATIVE MG/DL — SIGNIFICANT CHANGE UP
HCT VFR BLD CALC: 28.9 % — LOW (ref 39–50)
HCT VFR BLD CALC: 28.9 % — LOW (ref 39–50)
HGB BLD-MCNC: 9.5 G/DL — LOW (ref 13–17)
HGB BLD-MCNC: 9.5 G/DL — LOW (ref 13–17)
KETONES UR-MCNC: NEGATIVE MG/DL — SIGNIFICANT CHANGE UP
KETONES UR-MCNC: NEGATIVE MG/DL — SIGNIFICANT CHANGE UP
LEUKOCYTE ESTERASE UR-ACNC: NEGATIVE — SIGNIFICANT CHANGE UP
LEUKOCYTE ESTERASE UR-ACNC: NEGATIVE — SIGNIFICANT CHANGE UP
MAGNESIUM SERPL-MCNC: 2.4 MG/DL — SIGNIFICANT CHANGE UP (ref 1.6–2.6)
MAGNESIUM SERPL-MCNC: 2.4 MG/DL — SIGNIFICANT CHANGE UP (ref 1.6–2.6)
MCHC RBC-ENTMCNC: 31 PG — SIGNIFICANT CHANGE UP (ref 27–34)
MCHC RBC-ENTMCNC: 31 PG — SIGNIFICANT CHANGE UP (ref 27–34)
MCHC RBC-ENTMCNC: 32.9 GM/DL — SIGNIFICANT CHANGE UP (ref 32–36)
MCHC RBC-ENTMCNC: 32.9 GM/DL — SIGNIFICANT CHANGE UP (ref 32–36)
MCV RBC AUTO: 94.4 FL — SIGNIFICANT CHANGE UP (ref 80–100)
MCV RBC AUTO: 94.4 FL — SIGNIFICANT CHANGE UP (ref 80–100)
NITRITE UR-MCNC: NEGATIVE — SIGNIFICANT CHANGE UP
NITRITE UR-MCNC: NEGATIVE — SIGNIFICANT CHANGE UP
PH UR: 5.5 — SIGNIFICANT CHANGE UP (ref 5–8)
PH UR: 5.5 — SIGNIFICANT CHANGE UP (ref 5–8)
PHOSPHATE SERPL-MCNC: 4.3 MG/DL — SIGNIFICANT CHANGE UP (ref 2.4–4.7)
PHOSPHATE SERPL-MCNC: 4.3 MG/DL — SIGNIFICANT CHANGE UP (ref 2.4–4.7)
PLATELET # BLD AUTO: 442 K/UL — HIGH (ref 150–400)
PLATELET # BLD AUTO: 442 K/UL — HIGH (ref 150–400)
POTASSIUM SERPL-MCNC: 4.6 MMOL/L — SIGNIFICANT CHANGE UP (ref 3.5–5.3)
POTASSIUM SERPL-MCNC: 4.6 MMOL/L — SIGNIFICANT CHANGE UP (ref 3.5–5.3)
POTASSIUM SERPL-SCNC: 4.6 MMOL/L — SIGNIFICANT CHANGE UP (ref 3.5–5.3)
POTASSIUM SERPL-SCNC: 4.6 MMOL/L — SIGNIFICANT CHANGE UP (ref 3.5–5.3)
PROT UR-MCNC: 100 MG/DL
PROT UR-MCNC: 100 MG/DL
RBC # BLD: 3.06 M/UL — LOW (ref 4.2–5.8)
RBC # BLD: 3.06 M/UL — LOW (ref 4.2–5.8)
RBC # FLD: 13.2 % — SIGNIFICANT CHANGE UP (ref 10.3–14.5)
RBC # FLD: 13.2 % — SIGNIFICANT CHANGE UP (ref 10.3–14.5)
RBC CASTS # UR COMP ASSIST: 1 /HPF — SIGNIFICANT CHANGE UP (ref 0–4)
RBC CASTS # UR COMP ASSIST: 1 /HPF — SIGNIFICANT CHANGE UP (ref 0–4)
SODIUM SERPL-SCNC: 142 MMOL/L — SIGNIFICANT CHANGE UP (ref 135–145)
SODIUM SERPL-SCNC: 142 MMOL/L — SIGNIFICANT CHANGE UP (ref 135–145)
SP GR SPEC: 1.02 — SIGNIFICANT CHANGE UP (ref 1–1.03)
SP GR SPEC: 1.02 — SIGNIFICANT CHANGE UP (ref 1–1.03)
SPECIMEN SOURCE: SIGNIFICANT CHANGE UP
SPECIMEN SOURCE: SIGNIFICANT CHANGE UP
SQUAMOUS # UR AUTO: 1 /HPF — SIGNIFICANT CHANGE UP (ref 0–5)
SQUAMOUS # UR AUTO: 1 /HPF — SIGNIFICANT CHANGE UP (ref 0–5)
UROBILINOGEN FLD QL: 1 MG/DL — SIGNIFICANT CHANGE UP (ref 0.2–1)
UROBILINOGEN FLD QL: 1 MG/DL — SIGNIFICANT CHANGE UP (ref 0.2–1)
WBC # BLD: 17.42 K/UL — HIGH (ref 3.8–10.5)
WBC # BLD: 17.42 K/UL — HIGH (ref 3.8–10.5)
WBC # FLD AUTO: 17.42 K/UL — HIGH (ref 3.8–10.5)
WBC # FLD AUTO: 17.42 K/UL — HIGH (ref 3.8–10.5)
WBC UR QL: 1 /HPF — SIGNIFICANT CHANGE UP (ref 0–5)
WBC UR QL: 1 /HPF — SIGNIFICANT CHANGE UP (ref 0–5)

## 2023-11-26 PROCEDURE — 93880 EXTRACRANIAL BILAT STUDY: CPT | Mod: 26

## 2023-11-26 PROCEDURE — 70450 CT HEAD/BRAIN W/O DYE: CPT | Mod: 26

## 2023-11-26 PROCEDURE — 99233 SBSQ HOSP IP/OBS HIGH 50: CPT

## 2023-11-26 RX ORDER — TAMSULOSIN HYDROCHLORIDE 0.4 MG/1
0.8 CAPSULE ORAL AT BEDTIME
Refills: 0 | Status: DISCONTINUED | OUTPATIENT
Start: 2023-11-26 | End: 2023-12-04

## 2023-11-26 RX ORDER — DILTIAZEM HCL 120 MG
60 CAPSULE, EXT RELEASE 24 HR ORAL
Refills: 0 | Status: DISCONTINUED | OUTPATIENT
Start: 2023-11-26 | End: 2023-12-07

## 2023-11-26 RX ORDER — SODIUM CHLORIDE 9 MG/ML
1000 INJECTION INTRAMUSCULAR; INTRAVENOUS; SUBCUTANEOUS
Refills: 0 | Status: DISCONTINUED | OUTPATIENT
Start: 2023-11-26 | End: 2023-11-30

## 2023-11-26 RX ORDER — METOPROLOL TARTRATE 50 MG
50 TABLET ORAL
Refills: 0 | Status: DISCONTINUED | OUTPATIENT
Start: 2023-11-26 | End: 2023-12-07

## 2023-11-26 RX ADMIN — Medication 100 MILLIGRAM(S): at 06:07

## 2023-11-26 RX ADMIN — Medication 1 TABLET(S): at 11:37

## 2023-11-26 RX ADMIN — APIXABAN 5 MILLIGRAM(S): 2.5 TABLET, FILM COATED ORAL at 10:13

## 2023-11-26 RX ADMIN — POLYETHYLENE GLYCOL 3350 17 GRAM(S): 17 POWDER, FOR SOLUTION ORAL at 11:37

## 2023-11-26 RX ADMIN — MEROPENEM 1000 MILLIGRAM(S): 1 INJECTION INTRAVENOUS at 16:28

## 2023-11-26 RX ADMIN — CHLORHEXIDINE GLUCONATE 1 APPLICATION(S): 213 SOLUTION TOPICAL at 06:07

## 2023-11-26 RX ADMIN — SODIUM CHLORIDE 75 MILLILITER(S): 9 INJECTION INTRAMUSCULAR; INTRAVENOUS; SUBCUTANEOUS at 11:36

## 2023-11-26 RX ADMIN — MEROPENEM 1000 MILLIGRAM(S): 1 INJECTION INTRAVENOUS at 06:07

## 2023-11-26 RX ADMIN — Medication 60 MILLIGRAM(S): at 06:06

## 2023-11-26 RX ADMIN — Medication 10 MILLIGRAM(S): at 01:19

## 2023-11-26 RX ADMIN — LEVALBUTEROL 1.25 MILLIGRAM(S): 1.25 SOLUTION, CONCENTRATE RESPIRATORY (INHALATION) at 09:35

## 2023-11-26 RX ADMIN — Medication 81 MILLIGRAM(S): at 11:37

## 2023-11-26 RX ADMIN — Medication 1 MILLIGRAM(S): at 11:38

## 2023-11-26 RX ADMIN — LEVALBUTEROL 1.25 MILLIGRAM(S): 1.25 SOLUTION, CONCENTRATE RESPIRATORY (INHALATION) at 04:22

## 2023-11-26 RX ADMIN — SODIUM CHLORIDE 75 MILLILITER(S): 9 INJECTION INTRAMUSCULAR; INTRAVENOUS; SUBCUTANEOUS at 20:51

## 2023-11-26 RX ADMIN — LEVALBUTEROL 1.25 MILLIGRAM(S): 1.25 SOLUTION, CONCENTRATE RESPIRATORY (INHALATION) at 16:02

## 2023-11-26 NOTE — PROGRESS NOTE ADULT - ASSESSMENT
Patient is a 84 yo M w/ PMH of CAD s/p stents, HTN, Renal Cell CA s/p L nephrectomy, Bladder CA, BPH, CHF, LBBB, Vertigo, HLD, 5.5cm AAA without rupture post EVAR, paroxysmal A-fib on Eliquis, Plavix, and aspirin, early stage Alzheimer dementia who presented to [facility] s/p fall and found to have  R frontal IPH, SDH, SAH. He was transferred to [facility] Neuro-ICU for further care. NSG was consulted. IZABEL drain was placed. He underwent R hemicraniectomy on 11/10. He had his IZABEL drain removed on 11/11. Keppra was started for seizure prophylaxis. He was noted to have DONNIE and Nephrology was consulted. DONNIE was attributed due to ROXANNE and he was started on IVFs. . TTE was done which showed moderately calcified aortic valve with mobile echodensity. Blood cultures w/ NGTD. He tolerated extubation on 11/13. Course was complicated by AFib w/ RVR and Cardiology was consulted. Cardizem was started. He was to be downgraded to Medicine on 11/14 however downgrade was cancelled due to airway concerns. He was started on IV antibiotics for concerns of Klebsiella tracheobronchitis Repeat CTH were stable and ASA was started on 11/15. Respiratory status worsened and patient was placed on HFNC. Course was complicated by DVT of LUE brachial veins and Eliquis was started. CTH was repeated on Eliquis and remained stable. ID was consulted for leukocytosis and fever and patient's antibiotics were switched to Merrem. Upon infectious work up, incidental finding of infrarenal endograph, with reported increase in sac size compared to prior studies. Vascular was consulted, recommending no intervention and outpatient follow up. Patient now deemed appropriate for downgrade by Neuro-ICU to SDU.    DONNIE  - SCr up to 2.4, bladder scan with 900cc  - PRN straight cath and q6hr bladder scan  - Flomax  - check UA, urine studies   - renally dose meds, avoid nephrotoxic medications  - BMP in AM    Traumatic ICH  - S/p hemicraniectomy   - Neurocheck Q2H  - Completed Keppra for seizure prophylaxis  - NSG following  - EEG on 11/18 with epileptic activity - discussed w/ Neuro-ICU, no clinical signs of seizure, recommending holding AEDs for now.   - MRI brain result noted  - Repeat CTHs stable, Stat CTH for any neurological changes    Acute CVA  - noted on MRI from 11/13  - TTE result with ?Mass vs. artifact, cards to d/w family Monday about need for WHIT  - ASA, Eliquis and Lipitor  - carotid doppler pending  - neuro consulted, pending recs    Alzheimer's Dementia  - Continue Namenda    Hx of CAD, PAFib  - Continue BB, Diltiazem, ASA, Statin  - Started on Eliquis  - Now in NSR  - Cardiology recs appreciated    Aortic mobile echodensity  - Blood cultures with NGTD  - Cardiology recs appreciated  - f/u card recs about WHIT, due to MRI finding of multiple infarct   - Per cards discussion with daughter, will think about it, f/u daughter's decision about WHIT, likely sometimes next week unlikely Monday per cards)    Acute Respiratory Failure in setting of TBI, Tracheobronchitis  - Resolved  - Monitor respiratory status    Fever, leukocytosis  - ID following  - On Merrem  - Leukocytosis improving  - Monitor WBC, fever curve.    Dysphagia   - SLP following, advanced to pureed/mod thick liquids  - Aspiration precautions  - remove NGT    H/o Lt RCC s/p nephrectomy  Hypernatremia - resolved  - Baseline Creat ~1   - Nephrology recs appreciated  - Avoid nephrotoxic medications  - Monitor BMP, correct elecolytes as needed    LUE DVT   - On Eliquis    ?Endoleak  - Vascular consult appreciated, no surgical intervention recommended  - Outpt follow up    #Thrombocytopenia  - S/p 1u Plt on 11/14  - Resolved    #VTE Ppx: Eliquis    DISPO: Pending clinical course    Patient still legally  but  to wife. Has a GF. Patient elected daughters as proxy.

## 2023-11-26 NOTE — PROGRESS NOTE ADULT - SUBJECTIVE AND OBJECTIVE BOX
Jean Gonzalez M.D.    Patient is a 83y old  Male who presents with a chief complaint of s/p unwitnessed fall with head strike (25 Nov 2023 16:48)      SUBJECTIVE / OVERNIGHT EVENTS: no event overnight.     Patient denies chest pain, SOB, abd pain, N/V, fever, chills, dysuria or any other complaints. All remainder ROS negative.     MEDICATIONS  (STANDING):  amantadine 100 milliGRAM(s) Oral <User Schedule>  apixaban 5 milliGRAM(s) Oral every 12 hours  aspirin  chewable 81 milliGRAM(s) Oral daily  atorvastatin 80 milliGRAM(s) Oral at bedtime  chlorhexidine 2% Cloths 1 Application(s) Topical <User Schedule>  diltiazem    Tablet 60 milliGRAM(s) Oral <User Schedule>  folic acid 1 milliGRAM(s) Oral daily  levalbuterol Inhalation 1.25 milliGRAM(s) Inhalation every 6 hours  melatonin 3 milliGRAM(s) Oral at bedtime  meropenem Injectable 1000 milliGRAM(s) IV Push every 12 hours  meropenem Injectable      metoprolol tartrate 50 milliGRAM(s) Oral <User Schedule>  Nephro-roma 1 Tablet(s) Oral daily  polyethylene glycol 3350 17 Gram(s) Oral daily  senna 2 Tablet(s) Oral at bedtime  sodium chloride 0.9%. 1000 milliLiter(s) (75 mL/Hr) IV Continuous <Continuous>  tamsulosin 0.8 milliGRAM(s) Oral at bedtime    MEDICATIONS  (PRN):  acetaminophen   Oral Liquid .. 800 milliGRAM(s) Oral every 6 hours PRN Temp greater or equal to 38C (100.4F)  hydrALAZINE Injectable 10 milliGRAM(s) IV Push every 2 hours PRN SBP>160  labetalol Injectable 10 milliGRAM(s) IV Push every 2 hours PRN SBP>160      I&O's Summary    25 Nov 2023 07:01  -  26 Nov 2023 07:00  --------------------------------------------------------  IN: 720 mL / OUT: 350 mL / NET: 370 mL        PHYSICAL EXAM:  Vital Signs Last 24 Hrs  T(C): 37.2 (26 Nov 2023 12:12), Max: 37.2 (25 Nov 2023 16:10)  T(F): 99 (26 Nov 2023 12:12), Max: 99 (26 Nov 2023 08:03)  HR: 114 (26 Nov 2023 12:00) (86 - 125)  BP: 109/80 (26 Nov 2023 12:00) (93/63 - 170/84)  BP(mean): 87 (26 Nov 2023 12:00) (58 - 103)  RR: 22 (26 Nov 2023 12:00) (14 - 23)  SpO2: 96% (26 Nov 2023 12:00) (94% - 98%)    Parameters below as of 26 Nov 2023 12:00  Patient On (Oxygen Delivery Method): room air      CONSTITUTIONAL: NAD, easily arousable   RESPIRATORY: secretions noted, somewhat course b/s  CARDIOVASCULAR: Tachycardia, no LE edema   ABDOMEN: Nontender to palpation, normoactive bowel sounds  PSYCH: A+O x3; affect appropriate  NEUROLOGY: moving all extremities, but overall poor effort     LABS:                        9.5    17.42 )-----------( 442      ( 26 Nov 2023 08:50 )             28.9     11-26    142  |  104  |  55.9<H>  ----------------------------<  120<H>  4.6   |  20.0<L>  |  2.41<H>    Ca    9.1      26 Nov 2023 08:50  Phos  4.3     11-26  Mg     2.4     11-26    TPro  8.0  /  Alb  3.6  /  TBili  0.9  /  DBili  x   /  AST  28  /  ALT  16  /  AlkPhos  107  11-25          Urinalysis Basic - ( 26 Nov 2023 08:50 )    Color: x / Appearance: x / SG: x / pH: x  Gluc: 120 mg/dL / Ketone: x  / Bili: x / Urobili: x   Blood: x / Protein: x / Nitrite: x   Leuk Esterase: x / RBC: x / WBC x   Sq Epi: x / Non Sq Epi: x / Bacteria: x        CAPILLARY BLOOD GLUCOSE          RADIOLOGY & ADDITIONAL TESTS:  Results Reviewed:   Imaging Personally Reviewed:  Electrocardiogram Personally Reviewed:

## 2023-11-26 NOTE — PROGRESS NOTE ADULT - ASSESSMENT
ASSESSMENT:  83y M with PMH HTN, CAD s/p stents, renal cell carcinoma status post left nephrectomy, bladder CA, BPH, CHF, LBBB, vertigo,  HLD, 5.5cm AAA without rupture post EVAR, paroxysmal A-fib on Eliquis, Plavix, and aspirin, early stage Alzheimer dementia transferred from [facility] 11/10/23 s/p unwitnessed fall with head strike at home found by wife on floor at 8am. CT head at [place] showed R frontal IPH, SDH, SAH at 9:00. Patient is now POD#16 s/p R craniectomy for ICH on 11/10/23 by Dr. Larios.    PLAN:    -Q4hr neuro checks, okay for protected sleep    -Pain control PRN, avoid oversedation   -STAT CTH for any decline in neuro exam   -Helmet to be worn when OOB   -Cranioplasty planning, flap remains soft   -Puree diet with moderately thick liquids   -ID following on meropenem for leukocytosis and intermittent fever w/ tracheobronchitis and LUE DVT, appreciate recommendations   -Continue Eliquis 5mg BID   -Further medical management/supportive care per primary team   -Discussed case w/ Dr. Larios ASSESSMENT:  83y M with PMH HTN, CAD s/p stents, renal cell carcinoma status post left nephrectomy, bladder CA, BPH, CHF, LBBB, vertigo,  HLD, 5.5cm AAA without rupture post EVAR, paroxysmal A-fib on Eliquis, Plavix, and aspirin, early stage Alzheimer dementia transferred from [facility] 11/10/23 s/p unwitnessed fall with head strike at home found by wife on floor at 8am. CT head at [place] showed R frontal IPH, SDH, SAH at 9:00. Patient is now POD#16 s/p R craniectomy for ICH on 11/10/23 by Dr. Larios.    PLAN:    -Q4hr neuro checks, okay for protected sleep    -Pain control PRN, avoid oversedation   -STAT CTH for any decline in neuro exam   -Helmet to be worn when OOB   -Cranioplasty planning, flap remains soft   -Puree diet with moderately thick liquids   -Neurology following for b/l strokes; recommending WHIT for possible embolic mechanism; WHIT pending family decision  -ID following on meropenem for leukocytosis and intermittent fever w/ tracheobronchitis and LUE DVT, appreciate recommendations   -Continue Eliquis 5mg BID   -Further medical management/supportive care per primary team   -Discussed case w/ Dr. Larios

## 2023-11-26 NOTE — PROGRESS NOTE ADULT - SUBJECTIVE AND OBJECTIVE BOX
HPI:  82y M with PMH HTN, CAD s/p stents, renal cell carcinoma status post left nephrectomy, bladder CA, BPH, CHF, LBBB, vertigo,  HLD, 5.5cm AAA without rupture post EVAR, paroxysmal A-fib on Eliquis, Plavix, and aspirin, early stage Alzheimer dementia transferred from [facility] s/p unwitnessed fall with head strike at home found by wife on floor at 8am. Patient brought to urgent care by wife and had 5 staples to posterior scalp but was instructed to go to nearest ED.  CT head at [place] showed R frontal IPH, SDH, SAH at 9:00. CTA stroke protocol not performed at [facility]. Patient BIB on 6L NC.  Pt GCS 15 on arrival, A&Ox2 on arrival. After initial assessment, patient noted to be vomiting enroute to CT scanner.    (10 Nov 2023 11:04)      INTERVAL HPI/OVERNIGHT EVENTS:  83 year old male seen and examined by neurosurgery team, sitting comfortably in bed. Patient is now POD#15 s/p R craniectomy for ICH on 11/10/23. Patient minimally participates in exam, does not express any active complaints at this time. No acute overnight events reported. RN reports that patient able to consume meal this AM.     Vital Signs Last 24 Hrs  T(C): 37.1 (25 Nov 2023 10:00), Max: 37.1 (25 Nov 2023 04:09)  T(F): 98.7 (25 Nov 2023 10:00), Max: 98.7 (25 Nov 2023 04:09)  HR: 109 (25 Nov 2023 12:00) (90 - 113)  BP: 124/98 (25 Nov 2023 12:00) (101/57 - 159/82)  BP(mean): 79 (25 Nov 2023 06:00) (71 - 98)  RR: 14 (25 Nov 2023 12:00) (14 - 19)  SpO2: 98% (25 Nov 2023 12:00) (97% - 99%)    Parameters below as of 25 Nov 2023 12:00  Patient On (Oxygen Delivery Method): room air    PHYSICAL EXAM:  GENERAL: Lethargic, appears stated age   HEAD: S/p R craniectomy, flap soft, no active drainage/bleeding noted from incision site   JOEY COMA SCORE: E-3 V-2 M-5 = 10  MENTAL STATUS: AAO x0 - but not responding appropriately to questioning; Opens eyes to voice; Elicits incomphrensible sounds but is not conversant; Intermittently following commands  CRANIAL NERVES: PERRL. EOMI without nystagmus. Face grossly symmetric. Hearing grossly intact, speech unable to fully assess.   MOTOR: RUE 5/5; LUE 1/5; RLE WD; LLE weaker WD   SENSATION: Grossly intact to noxious stimuli in all extremities      LABS:                        11.4   18.32 )-----------( 378      ( 25 Nov 2023 06:20 )             35.9     11-25    141  |  103  |  39.7<H>  ----------------------------<  118<H>  5.2   |  21.0<L>  |  1.16    Ca    9.6      25 Nov 2023 06:20    TPro  8.0  /  Alb  3.6  /  TBili  0.9  /  DBili  x   /  AST  28  /  ALT  16  /  AlkPhos  107  11-25      Urinalysis Basic - ( 25 Nov 2023 06:20 )    Color: x / Appearance: x / SG: x / pH: x  Gluc: 118 mg/dL / Ketone: x  / Bili: x / Urobili: x   Blood: x / Protein: x / Nitrite: x   Leuk Esterase: x / RBC: x / WBC x   Sq Epi: x / Non Sq Epi: x / Bacteria: x        11-24 @ 07:01  -  11-25 @ 07:00  --------------------------------------------------------  IN: 0 mL / OUT: 680 mL / NET: -680 mL        RADIOLOGY & ADDITIONAL TESTS:  CT Head No Cont (11.24.23 @ 14:12):  IMPRESSION:  No change since 11/23/2023. Stereotactic protocol for cranioplasty.    CT Head No Cont (11.23.23 @ 02:24):  IMPRESSION:   Stable multicompartment intracranial hemorrhages. Stable   right-sided subdural collection.    US Duplex Arterial Upper Ext Ltd, Left (11.22.23 @ 11:27):  IMPRESSION:  No pseudoaneurysm arising from the left brachial artery is identified.  The left radial artery in the distal forearm is thrombosed.    CT Head No Cont (11.22.23 @ 09:17):  IMPRESSION:   Mixed attenuating subdural hematoma again seen with some   expected postoperative changes  Evolving area of parenchymal hemorrhage involving the right frontal   region. HPI:  82y M with PMH HTN, CAD s/p stents, renal cell carcinoma status post left nephrectomy, bladder CA, BPH, CHF, LBBB, vertigo,  HLD, 5.5cm AAA without rupture post EVAR, paroxysmal A-fib on Eliquis, Plavix, and aspirin, early stage Alzheimer dementia transferred from [facility] s/p unwitnessed fall with head strike at home found by wife on floor at 8am. Patient brought to urgent care by wife and had 5 staples to posterior scalp but was instructed to go to nearest ED.  CT head at [place] showed R frontal IPH, SDH, SAH at 9:00. CTA stroke protocol not performed at [facility]. Patient BIB on 6L NC.  Pt GCS 15 on arrival, A&Ox2 on arrival. After initial assessment, patient noted to be vomiting enroute to CT scanner.    (10 Nov 2023 11:04)      INTERVAL HPI/OVERNIGHT EVENTS:  83 year old male seen and examined by neurosurgery team, eating breakfast in bed. Patient is now POD#16 s/p R craniectomy for ICH on 11/10/23. Patient participates in exam, does not express any active complaints at this time.     Vital Signs Last 24 Hrs  T(C): 37.2 (11-26-23 @ 12:12), Max: 37.2 (11-25-23 @ 16:10)  T(F): 99 (11-26-23 @ 12:12), Max: 99 (11-26-23 @ 08:03)  HR: 104 (11-26-23 @ 14:00) (97 - 125)  BP: 109/56 (11-26-23 @ 14:00) (93/63 - 170/84)  BP(mean): 66 (11-26-23 @ 14:00) (58 - 103)  RR: 18 (11-26-23 @ 14:00) (14 - 23)  SpO2: 97% (11-26-23 @ 14:00) (94% - 98%)    PHYSICAL EXAM:  GENERAL: NAD  HEAD: S/p R craniectomy, flap soft, incision side c/d/i, no active drainage/bleeding noted from incision site   JOEY COMA SCORE: E-4 V-4 M-6 = 14 (some confusion)  MENTAL STATUS: AAO x3 (able to get year w/ choices); Opens eyes spontaneously; Answers simple questions w/ pauses; Following commands on R side  CRANIAL NERVES: PERRL. EOMI without nystagmus. Face grossly symmetric. Hearing grossly intact.   MOTOR: RUE 5/5; LUE WD; RLE wiggles toes; LLE weaker WD   SENSATION: Grossly intact to noxious stimuli in all extremities      LABS:                                   9.5    17.42 )-----------( 442      ( 26 Nov 2023 08:50 )             28.9   11-26    142  |  104  |  55.9<H>  ----------------------------<  120<H>  4.6   |  20.0<L>  |  2.41<H>    Ca    9.1      26 Nov 2023 08:50  Phos  4.3     11-26  Mg     2.4     11-26    TPro  8.0  /  Alb  3.6  /  TBili  0.9  /  DBili  x   /  AST  28  /  ALT  16  /  AlkPhos  107  11-25          RADIOLOGY & ADDITIONAL TESTS:  CT Head No Cont (11.24.23 @ 14:12):  IMPRESSION:  No change since 11/23/2023. Stereotactic protocol for cranioplasty.    CT Head No Cont (11.23.23 @ 02:24):  IMPRESSION:   Stable multicompartment intracranial hemorrhages. Stable   right-sided subdural collection.    US Duplex Arterial Upper Ext Ltd, Left (11.22.23 @ 11:27):  IMPRESSION:  No pseudoaneurysm arising from the left brachial artery is identified.  The left radial artery in the distal forearm is thrombosed.    CT Head No Cont (11.22.23 @ 09:17):  IMPRESSION:   Mixed attenuating subdural hematoma again seen with some   expected postoperative changes  Evolving area of parenchymal hemorrhage involving the right frontal   region.

## 2023-11-27 LAB
ANION GAP SERPL CALC-SCNC: 12 MMOL/L — SIGNIFICANT CHANGE UP (ref 5–17)
ANION GAP SERPL CALC-SCNC: 12 MMOL/L — SIGNIFICANT CHANGE UP (ref 5–17)
ANION GAP SERPL CALC-SCNC: 14 MMOL/L — SIGNIFICANT CHANGE UP (ref 5–17)
ANION GAP SERPL CALC-SCNC: 14 MMOL/L — SIGNIFICANT CHANGE UP (ref 5–17)
BUN SERPL-MCNC: 59.1 MG/DL — HIGH (ref 8–20)
BUN SERPL-MCNC: 59.1 MG/DL — HIGH (ref 8–20)
BUN SERPL-MCNC: 60.1 MG/DL — HIGH (ref 8–20)
BUN SERPL-MCNC: 60.1 MG/DL — HIGH (ref 8–20)
CALCIUM SERPL-MCNC: 8.8 MG/DL — SIGNIFICANT CHANGE UP (ref 8.4–10.5)
CALCIUM SERPL-MCNC: 8.8 MG/DL — SIGNIFICANT CHANGE UP (ref 8.4–10.5)
CALCIUM SERPL-MCNC: 9 MG/DL — SIGNIFICANT CHANGE UP (ref 8.4–10.5)
CALCIUM SERPL-MCNC: 9 MG/DL — SIGNIFICANT CHANGE UP (ref 8.4–10.5)
CHLORIDE SERPL-SCNC: 107 MMOL/L — SIGNIFICANT CHANGE UP (ref 96–108)
CHLORIDE SERPL-SCNC: 107 MMOL/L — SIGNIFICANT CHANGE UP (ref 96–108)
CHLORIDE SERPL-SCNC: 108 MMOL/L — SIGNIFICANT CHANGE UP (ref 96–108)
CHLORIDE SERPL-SCNC: 108 MMOL/L — SIGNIFICANT CHANGE UP (ref 96–108)
CO2 SERPL-SCNC: 21 MMOL/L — LOW (ref 22–29)
CO2 SERPL-SCNC: 21 MMOL/L — LOW (ref 22–29)
CO2 SERPL-SCNC: 22 MMOL/L — SIGNIFICANT CHANGE UP (ref 22–29)
CO2 SERPL-SCNC: 22 MMOL/L — SIGNIFICANT CHANGE UP (ref 22–29)
CREAT SERPL-MCNC: 1.8 MG/DL — HIGH (ref 0.5–1.3)
CREAT SERPL-MCNC: 1.8 MG/DL — HIGH (ref 0.5–1.3)
CREAT SERPL-MCNC: 2.07 MG/DL — HIGH (ref 0.5–1.3)
CREAT SERPL-MCNC: 2.07 MG/DL — HIGH (ref 0.5–1.3)
CULTURE RESULTS: SIGNIFICANT CHANGE UP
EGFR: 31 ML/MIN/1.73M2 — LOW
EGFR: 31 ML/MIN/1.73M2 — LOW
EGFR: 37 ML/MIN/1.73M2 — LOW
EGFR: 37 ML/MIN/1.73M2 — LOW
GLUCOSE SERPL-MCNC: 116 MG/DL — HIGH (ref 70–99)
GLUCOSE SERPL-MCNC: 116 MG/DL — HIGH (ref 70–99)
GLUCOSE SERPL-MCNC: 118 MG/DL — HIGH (ref 70–99)
GLUCOSE SERPL-MCNC: 118 MG/DL — HIGH (ref 70–99)
HCT VFR BLD CALC: 25.8 % — LOW (ref 39–50)
HCT VFR BLD CALC: 25.8 % — LOW (ref 39–50)
HGB BLD-MCNC: 8.4 G/DL — LOW (ref 13–17)
HGB BLD-MCNC: 8.4 G/DL — LOW (ref 13–17)
MAGNESIUM SERPL-MCNC: 2.7 MG/DL — HIGH (ref 1.8–2.6)
MAGNESIUM SERPL-MCNC: 2.7 MG/DL — HIGH (ref 1.8–2.6)
MCHC RBC-ENTMCNC: 31 PG — SIGNIFICANT CHANGE UP (ref 27–34)
MCHC RBC-ENTMCNC: 31 PG — SIGNIFICANT CHANGE UP (ref 27–34)
MCHC RBC-ENTMCNC: 32.6 GM/DL — SIGNIFICANT CHANGE UP (ref 32–36)
MCHC RBC-ENTMCNC: 32.6 GM/DL — SIGNIFICANT CHANGE UP (ref 32–36)
MCV RBC AUTO: 95.2 FL — SIGNIFICANT CHANGE UP (ref 80–100)
MCV RBC AUTO: 95.2 FL — SIGNIFICANT CHANGE UP (ref 80–100)
PHOSPHATE SERPL-MCNC: 4.9 MG/DL — HIGH (ref 2.4–4.7)
PHOSPHATE SERPL-MCNC: 4.9 MG/DL — HIGH (ref 2.4–4.7)
PLATELET # BLD AUTO: 371 K/UL — SIGNIFICANT CHANGE UP (ref 150–400)
PLATELET # BLD AUTO: 371 K/UL — SIGNIFICANT CHANGE UP (ref 150–400)
POTASSIUM SERPL-MCNC: 4.5 MMOL/L — SIGNIFICANT CHANGE UP (ref 3.5–5.3)
POTASSIUM SERPL-SCNC: 4.5 MMOL/L — SIGNIFICANT CHANGE UP (ref 3.5–5.3)
RBC # BLD: 2.71 M/UL — LOW (ref 4.2–5.8)
RBC # BLD: 2.71 M/UL — LOW (ref 4.2–5.8)
RBC # FLD: 13.4 % — SIGNIFICANT CHANGE UP (ref 10.3–14.5)
RBC # FLD: 13.4 % — SIGNIFICANT CHANGE UP (ref 10.3–14.5)
SODIUM SERPL-SCNC: 141 MMOL/L — SIGNIFICANT CHANGE UP (ref 135–145)
SODIUM SERPL-SCNC: 141 MMOL/L — SIGNIFICANT CHANGE UP (ref 135–145)
SODIUM SERPL-SCNC: 143 MMOL/L — SIGNIFICANT CHANGE UP (ref 135–145)
SODIUM SERPL-SCNC: 143 MMOL/L — SIGNIFICANT CHANGE UP (ref 135–145)
SPECIMEN SOURCE: SIGNIFICANT CHANGE UP
WBC # BLD: 12.56 K/UL — HIGH (ref 3.8–10.5)
WBC # BLD: 12.56 K/UL — HIGH (ref 3.8–10.5)
WBC # FLD AUTO: 12.56 K/UL — HIGH (ref 3.8–10.5)
WBC # FLD AUTO: 12.56 K/UL — HIGH (ref 3.8–10.5)

## 2023-11-27 PROCEDURE — 93971 EXTREMITY STUDY: CPT | Mod: 26,LT

## 2023-11-27 PROCEDURE — 70450 CT HEAD/BRAIN W/O DYE: CPT | Mod: 26

## 2023-11-27 PROCEDURE — 96136 PSYCL/NRPSYC TST PHY/QHP 1ST: CPT

## 2023-11-27 PROCEDURE — 99233 SBSQ HOSP IP/OBS HIGH 50: CPT | Mod: GC

## 2023-11-27 PROCEDURE — 99233 SBSQ HOSP IP/OBS HIGH 50: CPT

## 2023-11-27 PROCEDURE — 96116 NUBHVL XM PHYS/QHP 1ST HR: CPT

## 2023-11-27 RX ORDER — SENNA PLUS 8.6 MG/1
2 TABLET ORAL AT BEDTIME
Refills: 0 | Status: DISCONTINUED | OUTPATIENT
Start: 2023-11-27 | End: 2023-12-07

## 2023-11-27 RX ORDER — AMANTADINE HCL 100 MG
100 CAPSULE ORAL
Refills: 0 | Status: DISCONTINUED | OUTPATIENT
Start: 2023-11-27 | End: 2023-11-30

## 2023-11-27 RX ORDER — FOLIC ACID 0.8 MG
1 TABLET ORAL DAILY
Refills: 0 | Status: DISCONTINUED | OUTPATIENT
Start: 2023-11-27 | End: 2023-12-07

## 2023-11-27 RX ORDER — LANOLIN ALCOHOL/MO/W.PET/CERES
3 CREAM (GRAM) TOPICAL AT BEDTIME
Refills: 0 | Status: DISCONTINUED | OUTPATIENT
Start: 2023-11-27 | End: 2023-11-27

## 2023-11-27 RX ORDER — POLYETHYLENE GLYCOL 3350 17 G/17G
17 POWDER, FOR SOLUTION ORAL DAILY
Refills: 0 | Status: DISCONTINUED | OUTPATIENT
Start: 2023-11-27 | End: 2023-12-07

## 2023-11-27 RX ORDER — LANOLIN ALCOHOL/MO/W.PET/CERES
6 CREAM (GRAM) TOPICAL AT BEDTIME
Refills: 0 | Status: DISCONTINUED | OUTPATIENT
Start: 2023-11-27 | End: 2023-12-07

## 2023-11-27 RX ORDER — ACETAMINOPHEN 500 MG
650 TABLET ORAL EVERY 6 HOURS
Refills: 0 | Status: DISCONTINUED | OUTPATIENT
Start: 2023-11-27 | End: 2023-12-07

## 2023-11-27 RX ADMIN — LEVALBUTEROL 1.25 MILLIGRAM(S): 1.25 SOLUTION, CONCENTRATE RESPIRATORY (INHALATION) at 15:54

## 2023-11-27 RX ADMIN — LEVALBUTEROL 1.25 MILLIGRAM(S): 1.25 SOLUTION, CONCENTRATE RESPIRATORY (INHALATION) at 00:33

## 2023-11-27 RX ADMIN — TAMSULOSIN HYDROCHLORIDE 0.8 MILLIGRAM(S): 0.4 CAPSULE ORAL at 21:09

## 2023-11-27 RX ADMIN — MEROPENEM 1000 MILLIGRAM(S): 1 INJECTION INTRAVENOUS at 17:22

## 2023-11-27 RX ADMIN — Medication 50 MILLIGRAM(S): at 00:25

## 2023-11-27 RX ADMIN — POLYETHYLENE GLYCOL 3350 17 GRAM(S): 17 POWDER, FOR SOLUTION ORAL at 12:26

## 2023-11-27 RX ADMIN — Medication 1 MILLIGRAM(S): at 12:26

## 2023-11-27 RX ADMIN — LEVALBUTEROL 1.25 MILLIGRAM(S): 1.25 SOLUTION, CONCENTRATE RESPIRATORY (INHALATION) at 19:55

## 2023-11-27 RX ADMIN — Medication 1 TABLET(S): at 12:26

## 2023-11-27 RX ADMIN — ATORVASTATIN CALCIUM 80 MILLIGRAM(S): 80 TABLET, FILM COATED ORAL at 21:08

## 2023-11-27 RX ADMIN — Medication 60 MILLIGRAM(S): at 05:37

## 2023-11-27 RX ADMIN — LEVALBUTEROL 1.25 MILLIGRAM(S): 1.25 SOLUTION, CONCENTRATE RESPIRATORY (INHALATION) at 04:12

## 2023-11-27 RX ADMIN — Medication 650 MILLIGRAM(S): at 20:52

## 2023-11-27 RX ADMIN — Medication 100 MILLIGRAM(S): at 05:37

## 2023-11-27 RX ADMIN — MEROPENEM 1000 MILLIGRAM(S): 1 INJECTION INTRAVENOUS at 05:34

## 2023-11-27 RX ADMIN — Medication 60 MILLIGRAM(S): at 17:23

## 2023-11-27 RX ADMIN — SENNA PLUS 2 TABLET(S): 8.6 TABLET ORAL at 21:09

## 2023-11-27 RX ADMIN — SODIUM CHLORIDE 75 MILLILITER(S): 9 INJECTION INTRAMUSCULAR; INTRAVENOUS; SUBCUTANEOUS at 08:23

## 2023-11-27 RX ADMIN — Medication 6 MILLIGRAM(S): at 21:08

## 2023-11-27 RX ADMIN — CHLORHEXIDINE GLUCONATE 1 APPLICATION(S): 213 SOLUTION TOPICAL at 05:34

## 2023-11-27 RX ADMIN — LEVALBUTEROL 1.25 MILLIGRAM(S): 1.25 SOLUTION, CONCENTRATE RESPIRATORY (INHALATION) at 08:44

## 2023-11-27 RX ADMIN — Medication 50 MILLIGRAM(S): at 12:26

## 2023-11-27 NOTE — PROGRESS NOTE ADULT - ASSESSMENT
82y M with TBI - R frontal IPH, SDH, tSAH; s/p R decompressive hemicraniectomy 11/10.  Encephalopathy due to TBI, resp. infection, resp. distress, delirium.  Respiratory distress due to impaired bulbar function with difficulty managing secretions, aspiration PNA.  PMH of CAD s/p stents, CHF; HTN, HLD. HIT, AAA post EVAR. Parox. A.Fib, on Cardizem.   PMH of renal cell carcinoma status post left nephrectomy, bladder CA, BPH  Nonoliguric DONNIE, likely ROXANNE, critical illness; improving.  AV vegetation on TTE - unclear etiology. Several BCx negative.  Hypernatremia.     Plan -  Neuro: EEG with epileptiform discharges 11/18  neurochecks q2h, protected sleep time  amantadine 100mg daily, renally dosed  melatonin at night  Mobility: PT/OT    Resp:   spO2 >92%  pulmonary toilet    CV:  cardiology consulted, appreciate recs  maintain -150, HR<120  metoprolol 25mg BID, cardizem 60 BID  holding ASA    GI:  puree diet, mod thick liquids  bowel regimen    Renal:   monitor e-lytes and UOP  Encourage PO hydration  [place] -> d/c 11/28  flomax 0.8mg  Rpt BMP in PM    ID: Klebs tracheobronchitis, 11/21 new leukocytosis >20  ID consulted, appreciate recs  continue meropenem  Trend fever curve and WBC    Heme: h/o HIT, LUE brachial DVT  Consult vascular re: DVT mgmt  Consult IR re:IVC filter  No fondaparinux i/s/o DONNIE  SCDs, mobilize as able    Dispo: keep in ICU for ongoing interdisciplinary care

## 2023-11-27 NOTE — CHART NOTE - NSCHARTNOTEFT_GEN_A_CORE
Patient known via prior consultation   ICH post crainectomy  Hist of EVAR  Consult received from Neuro ICU pA regarding IV associated upper ext venous thrombus referencing venous duplex on Nov 21  Patient was reported being managed with therapeutic anticoagulation but has since been discontinued for concerns of asia and ?ICH reoccurrence    -- Recommend to repeat upper ext duplex to rule out significant venous thrombosis (IJ/AXillary/Subclacian)  -- Consult hematology regarding questions pertaining to anticoagulation   -- will see and examine patient Patient known via prior consultation   ICH post craniectomy  Hist of EVAR  Consult received from Neuro ICU pA regarding IV associated upper ext venous thrombus referencing venous duplex on Nov 21  Patient was reported being managed with therapeutic anticoagulation but has since been discontinued for concerns of asia and ?ICH reoccurrence    -- Recommend to repeat upper ext duplex to rule out significant venous thrombosis (IJ/AXillary/Subclavian)  -- Consult hematology regarding questions pertaining to anticoagulation   -- will see and examine patient    Vascular update  Patient seen and examined   no significant edema to the upper extremities   Excellent perfusion to the distal aspects of the extremities , Left Upper ext with non palpable radial, Ulnar provides dominant perfusion     -- No indication for any immediate vascular surgical intervention   -- will await repeat duplex

## 2023-11-27 NOTE — PROGRESS NOTE ADULT - SUBJECTIVE AND OBJECTIVE BOX
WHIT?     FUNCTIONAL PROGRESS  SLP 11/23   Recommendations  Speech Language Pathology Recommendations: 1. Initiate moderately thick liquid via tsp trials, continue puree as tolerated2. STRICT aspiration precautions: if overt s/s noted, d/c PO diet immediately & resume NPO status pending re-assess by this dept3. Non-oral meds, continue via NGT4. 1:1 assist for all meals5. Only feed when **AWAKE/ALERT **6. Upright for all PO, small bites/sips, slow rate7. Oral care8. Monitor closely for tolerance & stable respiratory status 9. SLP to follow     PT 11/26   Bed Mobility  Bed Mobility Training Supine-to-Sit: maximum assist (25% patient effort);  2 person assist  Bed Mobility Training Limitations: impaired postural control;  decreased ability to use arms for pushing/pulling;  decreased ability to use legs for bridging/pushing    Bed-Chair Transfer Training  Transfer Training Bed-to-Chair Transfer: maximum assist (25% patient effort);  2 person assist;  stand-pivot    Sit-Stand Transfer Training  Transfer Training Sit-to-Stand Transfer: maximum assist (25% patient effort);  2 person assist  Transfer Training Stand-to-Sit Transfer: maximum assist (25% patient effort);  2 person assist    Gait Training  Gait Training Treatment not Performed: unable to perform    OT 11/20  Lower Body Dressing Training  Lower Body Dressing Training Assistance: dependent (less than 25% patient effort);  decreased ROM;  decreased strength;  impaired balance    Upper Body Dressing Training  Upper Body Dressing Training Assistance: dependent (less than 25% patient effort);  decreased ROM;  decreased strength;  impaired balance    VITALS  T(C): 37.2 (11-27-23 @ 09:00), Max: 37.9 (11-26-23 @ 16:00)  HR: 86 (11-27-23 @ 09:00) (79 - 125)  BP: 134/65 (11-27-23 @ 09:00) (93/63 - 134/65)  RR: 17 (11-27-23 @ 09:00) (13 - 23)  SpO2: 100% (11-27-23 @ 09:00) (93% - 100%)  Wt(kg): --    MEDICATIONS   acetaminophen     Tablet .. 650 milliGRAM(s) every 6 hours PRN  amantadine 100 milliGRAM(s) <User Schedule>  atorvastatin 80 milliGRAM(s) at bedtime  chlorhexidine 2% Cloths 1 Application(s) <User Schedule>  diltiazem    Tablet 60 milliGRAM(s) <User Schedule>  folic acid 1 milliGRAM(s) daily  hydrALAZINE Injectable 10 milliGRAM(s) every 2 hours PRN  labetalol Injectable 10 milliGRAM(s) every 2 hours PRN  levalbuterol Inhalation 1.25 milliGRAM(s) every 6 hours  melatonin 3 milliGRAM(s) at bedtime  meropenem Injectable 1000 milliGRAM(s) every 12 hours  meropenem Injectable     metoprolol tartrate 50 milliGRAM(s) <User Schedule>  Nephro-roma 1 Tablet(s) daily  polyethylene glycol 3350 17 Gram(s) daily  senna 2 Tablet(s) at bedtime  sodium chloride 0.9%. 1000 milliLiter(s) <Continuous>  tamsulosin 0.8 milliGRAM(s) at bedtime      RECENT LABS/IMAGING  - Reviewed Today                        8.4    12.56 )-----------( 371      ( 27 Nov 2023 03:44 )             25.8     11-27    143  |  108  |  60.1<H>  ----------------------------<  116<H>  4.5   |  21.0<L>  |  2.07<H>    Ca    8.8      27 Nov 2023 03:44  Phos  4.9     11-27  Mg     2.7     11-27        Urinalysis Basic - ( 27 Nov 2023 03:44 )    Color: x / Appearance: x / SG: x / pH: x  Gluc: 116 mg/dL / Ketone: x  / Bili: x / Urobili: x   Blood: x / Protein: x / Nitrite: x   Leuk Esterase: x / RBC: x / WBC x   Sq Epi: x / Non Sq Epi: x / Bacteria: x      CTH 11/23 - Stable multicompartment intracranial hemorrhages. Stable right-sided subdural collection.    CTH 11/22 - Mixed attenuating subdural hematoma again seen with some   expected postoperative changes. Evolving area of parenchymal hemorrhage involving the right frontal   region.    CTH 11/16/23: Improved to stable multicompartment intracranial hemorrhages.   Right-sided subdural hygroma, larger in size.    US duplex BLE 11/16/23: No evidence of deep venous thrombosis in either lower extremity.    CXR 11/15/23: There is mild pulmonary venous congestion. Left retrocardiac/lower lobe   opacity could represent associated pneumonia.    MR Brain 11/13/23: RIGHT frontoparietal craniectomy with underlying   intracranial hemorrhage and edema within the RIGHT frontal lobe.   Overlying small RIGHT subdural hemorrhage is also unchanged. Scant   hemorrhage in the dependent portions of the BILATERAL lateral ventricles   and minimal subarachnoid hemorrhage seen in the BILATERAL frontal and   parietal lobes. Moderate periventricular and deep white matter ischemia   is noted. Encephalomalacia and gliosis seen in the anterior frontal lobes   bilaterally. Tiny acute lacunar infarction in the LEFT cerebellum and   tiny acute cortical infarction in the RIGHT parietal lobe. Restricted   diffusion also noted about the surgical cavity suggesting infarcted   parenchyma.    ----------------------------------------------------------------------------------------  PHYSICAL EXAM  Constitutional - somnolent, difficult to arouse   HEENT - +R crani, gauze dressing in place, no bleeding or soaking through dressing, +NGT  Chest - +rhonchi +Cough +inc work of breathing.   Cardiovascular - Tachycardia   Abdomen - Soft   Extremities - +left hand edema, BL LE SCDs in place.   Neurologic Exam -                    Cognitive - Intermittently follows commands       Communication - unable to assess due to somnolence.      Cranial Nerves - No facial asymmetry      Motor -                    LEFT    UE - flaccid                     RIGHT UE - moves extremity spontaneously to pain                     LEFT    LE - moves extremity spontaneously to pain                     RIGHT LE - moves extremity spontaneously to pain      ----------------------------------------------------------------------------------------  ASSESSMENT/PLAN  83yMale with functional deficits after multicompartmental intracranial bleeding.    Traumatic ICH - Stable   Alzheimer's Dementia - Recommend restarting Namenda 5mg BID    Wakefulness - Recommend increasing Amantadine to 150mg @0600   CAD, PAfib - ASA, Statin, Dilt, Metop   HTN - Hydralazine, labetalol   Fever - Merrem   Dysphagia - Pureed/mod thick   Delirium - Recommend increasing melatonin 6mg qhs   LUE DVT - Apixaban   Dysphagia - Pureed/mod thick   DVT ppx - Apixaban   Rehab/Impaired mobility and function - Continuous hospitalization is crucial for managing the patient's acute medical issues (Traumatic ICH, AMS, decreased wakefulness), which have significantly impacted their mobility, quality of life, and function. Rehabilitation recommendations will be based on the patient's functional progress and their ability to participate in and tolerate therapeutic interventions, which may change over time. Maintaining ongoing mobilization by the staff is imperative to prevent secondary medical complications and associated health issues related to debility.    The patient continues to suffer from AMS with increased lethargy. Would recommend increasing Amantadine form 100mg @0600 to Amantadine 150mg @0600. Would also increase Melatonin 3mg qhs to Melatonin 6mg qhs.     The patient will likely benefit from HOLLY placement. PM&R will continue to follow.          ICU level care   Pending WHIT?  Holding ASA/eliquis   More awake     FUNCTIONAL PROGRESS  SLP 11/23   Recommendations  Speech Language Pathology Recommendations: 1. Initiate moderately thick liquid via tsp trials, continue puree as tolerated2. STRICT aspiration precautions: if overt s/s noted, d/c PO diet immediately & resume NPO status pending re-assess by this dept3. Non-oral meds, continue via NGT4. 1:1 assist for all meals5. Only feed when **AWAKE/ALERT **6. Upright for all PO, small bites/sips, slow rate7. Oral care8. Monitor closely for tolerance & stable respiratory status 9. SLP to follow     PT 11/26   Bed Mobility  Bed Mobility Training Supine-to-Sit: maximum assist (25% patient effort);  2 person assist  Bed Mobility Training Limitations: impaired postural control;  decreased ability to use arms for pushing/pulling;  decreased ability to use legs for bridging/pushing    Bed-Chair Transfer Training  Transfer Training Bed-to-Chair Transfer: maximum assist (25% patient effort);  2 person assist;  stand-pivot    Sit-Stand Transfer Training  Transfer Training Sit-to-Stand Transfer: maximum assist (25% patient effort);  2 person assist  Transfer Training Stand-to-Sit Transfer: maximum assist (25% patient effort);  2 person assist    Gait Training  Gait Training Treatment not Performed: unable to perform    OT 11/20  Lower Body Dressing Training  Lower Body Dressing Training Assistance: dependent (less than 25% patient effort);  decreased ROM;  decreased strength;  impaired balance    Upper Body Dressing Training  Upper Body Dressing Training Assistance: dependent (less than 25% patient effort);  decreased ROM;  decreased strength;  impaired balance    VITALS  T(C): 37.2 (11-27-23 @ 09:00), Max: 37.9 (11-26-23 @ 16:00)  HR: 86 (11-27-23 @ 09:00) (79 - 125)  BP: 134/65 (11-27-23 @ 09:00) (93/63 - 134/65)  RR: 17 (11-27-23 @ 09:00) (13 - 23)  SpO2: 100% (11-27-23 @ 09:00) (93% - 100%)  Wt(kg): --    MEDICATIONS   acetaminophen     Tablet .. 650 milliGRAM(s) every 6 hours PRN  amantadine 100 milliGRAM(s) <User Schedule>  atorvastatin 80 milliGRAM(s) at bedtime  chlorhexidine 2% Cloths 1 Application(s) <User Schedule>  diltiazem    Tablet 60 milliGRAM(s) <User Schedule>  folic acid 1 milliGRAM(s) daily  hydrALAZINE Injectable 10 milliGRAM(s) every 2 hours PRN  labetalol Injectable 10 milliGRAM(s) every 2 hours PRN  levalbuterol Inhalation 1.25 milliGRAM(s) every 6 hours  melatonin 3 milliGRAM(s) at bedtime  meropenem Injectable 1000 milliGRAM(s) every 12 hours  meropenem Injectable     metoprolol tartrate 50 milliGRAM(s) <User Schedule>  Nephro-roma 1 Tablet(s) daily  polyethylene glycol 3350 17 Gram(s) daily  senna 2 Tablet(s) at bedtime  sodium chloride 0.9%. 1000 milliLiter(s) <Continuous>  tamsulosin 0.8 milliGRAM(s) at bedtime      RECENT LABS/IMAGING  - Reviewed Today                        8.4    12.56 )-----------( 371      ( 27 Nov 2023 03:44 )             25.8     11-27    143  |  108  |  60.1<H>  ----------------------------<  116<H>  4.5   |  21.0<L>  |  2.07<H>    Ca    8.8      27 Nov 2023 03:44  Phos  4.9     11-27  Mg     2.7     11-27        Urinalysis Basic - ( 27 Nov 2023 03:44 )    Color: x / Appearance: x / SG: x / pH: x  Gluc: 116 mg/dL / Ketone: x  / Bili: x / Urobili: x   Blood: x / Protein: x / Nitrite: x   Leuk Esterase: x / RBC: x / WBC x   Sq Epi: x / Non Sq Epi: x / Bacteria: x      CT 11/23 - Stable multicompartment intracranial hemorrhages. Stable right-sided subdural collection.    CT 11/22 - Mixed attenuating subdural hematoma again seen with some   expected postoperative changes. Evolving area of parenchymal hemorrhage involving the right frontal   region.    CTH 11/16/23: Improved to stable multicompartment intracranial hemorrhages.   Right-sided subdural hygroma, larger in size.    US duplex BLE 11/16/23: No evidence of deep venous thrombosis in either lower extremity.    CXR 11/15/23: There is mild pulmonary venous congestion. Left retrocardiac/lower lobe   opacity could represent associated pneumonia.    MR Brain 11/13/23: RIGHT frontoparietal craniectomy with underlying   intracranial hemorrhage and edema within the RIGHT frontal lobe.   Overlying small RIGHT subdural hemorrhage is also unchanged. Scant   hemorrhage in the dependent portions of the BILATERAL lateral ventricles   and minimal subarachnoid hemorrhage seen in the BILATERAL frontal and   parietal lobes. Moderate periventricular and deep white matter ischemia   is noted. Encephalomalacia and gliosis seen in the anterior frontal lobes   bilaterally. Tiny acute lacunar infarction in the LEFT cerebellum and   tiny acute cortical infarction in the RIGHT parietal lobe. Restricted   diffusion also noted about the surgical cavity suggesting infarcted   parenchyma.    ----------------------------------------------------------------------------------------  PHYSICAL EXAM  Constitutional - Awake   HEENT - +R crani, gauze dressing in place, no bleeding or soaking through dressing   Chest - Breathing comfortably on NC   Cardiovascular - Tachycardia   Abdomen - Soft   Extremities - No peripheral edema   Neurologic Exam -                    Cognitive - Intermittently follows commands       Communication - Mumbles      Cranial Nerves - No facial asymmetry      Motor -                    LEFT    UE - flaccid                     RIGHT UE - moves extremity spontaneously to pain                     LEFT    LE - moves extremity spontaneously to pain                     RIGHT LE - moves extremity spontaneously to pain      ----------------------------------------------------------------------------------------  ASSESSMENT/PLAN  83yMale with functional deficits after multicompartmental intracranial bleeding.    Traumatic ICH - Stable   Acute CVA? - WHIT pending   Alzheimer's Dementia - Recommend restarting Namenda 5mg BID, Neuropsychology (Dr. Diallo) to follow    Wakefulness - Recommend increasing Amantadine to 150mg @0600   CAD, PAfib - ASA, Statin, Dilt, Metop   HTN - Hydralazine, labetalol   Fever - Merrem   Dysphagia - Pureed/mod thick   Delirium - Recommend increasing melatonin 6mg qhs   LUE DVT - Apixaban?   Dysphagia - Pureed/mod thick   DVT ppx - SCD    Rehab/Impaired mobility and function - Continuous hospitalization is crucial for managing the patient's acute medical issues (Traumatic ICH, AMS, decreased wakefulness), which have significantly impacted their mobility, quality of life, and function. Rehabilitation recommendations will be based on the patient's functional progress and their ability to participate in and tolerate therapeutic interventions, which may change over time. Maintaining ongoing mobilization by the staff is imperative to prevent secondary medical complications and associated health issues related to debility.    The patient continues to suffer from AMS with increased lethargy. Would recommend increasing Amantadine form 100mg @0600 to Amantadine 150mg @0600. Would also increase Melatonin 3mg qhs to Melatonin 6mg qhs. Recommend restarting Namenda 5mg BID.     The patient will likely benefit from HOLLY placement. PM&R will continue to follow.

## 2023-11-27 NOTE — PROGRESS NOTE ADULT - SUBJECTIVE AND OBJECTIVE BOX
Preliminary note, offical recommendations pending attending review/signature   [facility] Stroke Team  Progress Note     HPI:  83 year old Male with PMHx HTN, CAD s/p stents, renal cell carcinoma status post left nephrectomy, bladder CA, BPH, CHF, LBBB, vertigo,  HLD, 5.5cm AAA without rupture post EVAR, paroxysmal A-fib on Eliquis, Plavix, and aspirin, early stage Alzheimer dementia, transferred from [facility] on 11/10/23 s/p unwitnessed fall with head strike at home, found by wife on floor at 8am. Patient brought to urgent care by wife and had 5 staples to posterior scalp but was instructed to go to nearest ED.  CT head at [place] showed R frontal IPH, SDH, SAH at 9:00. s/p Right craniectomy for ICH 11/10/23. Found with LUE DVT, on Eliquis. Downgraded to stepdown on 11/23. Neurology consulted 11/25 to evaluate secondary stroke found on MRI brain. Re-upgraded to NSICU 11/26 for new acute anterior falx SDH.    SUBJECTIVE: No events overnight.  No new neurologic complaints.  ROS reported negative unless otherwise noted.    acetaminophen     Tablet .. 650 milliGRAM(s) Oral every 6 hours PRN  amantadine 100 milliGRAM(s) Oral <User Schedule>  atorvastatin 80 milliGRAM(s) Oral at bedtime  chlorhexidine 2% Cloths 1 Application(s) Topical <User Schedule>  diltiazem    Tablet 60 milliGRAM(s) Oral <User Schedule>  folic acid 1 milliGRAM(s) Oral daily  hydrALAZINE Injectable 10 milliGRAM(s) IV Push every 2 hours PRN  labetalol Injectable 10 milliGRAM(s) IV Push every 2 hours PRN  levalbuterol Inhalation 1.25 milliGRAM(s) Inhalation every 6 hours  melatonin 3 milliGRAM(s) Oral at bedtime  meropenem Injectable 1000 milliGRAM(s) IV Push every 12 hours  meropenem Injectable      metoprolol tartrate 50 milliGRAM(s) Oral <User Schedule>  Nephro-roma 1 Tablet(s) Oral daily  polyethylene glycol 3350 17 Gram(s) Oral daily  senna 2 Tablet(s) Oral at bedtime  sodium chloride 0.9%. 1000 milliLiter(s) IV Continuous <Continuous>  tamsulosin 0.8 milliGRAM(s) Oral at bedtime      PHYSICAL EXAM:   Vital Signs Last 24 Hrs  T(C): 37.3 (27 Nov 2023 11:00), Max: 37.9 (26 Nov 2023 16:00)  T(F): 99.1 (27 Nov 2023 11:00), Max: 100.2 (26 Nov 2023 16:00)  HR: 91 (27 Nov 2023 11:00) (79 - 114)  BP: 115/68 (27 Nov 2023 11:00) (104/67 - 134/65)  BP(mean): 82 (27 Nov 2023 11:00) (22 - 88)  RR: 19 (27 Nov 2023 11:00) (13 - 23)  SpO2: 100% (27 Nov 2023 11:00) (93% - 100%)    Parameters below as of 27 Nov 2023 08:46  Patient On (Oxygen Delivery Method): room air    EXAM PENDING VISIT    General: No acute distress    Detailed Neurologic Exam:    Mental status: The patient is awake and alert and has diminshed attention span.  The patient is oriented self and hospital.  The patient is able to name objects, follow commands, repeat sentences. He is hypophonic    Cranial nerves: Pupils equal and react symmetrically to light. There is no left sided blink to threat. Extraocular motion testing reveals right gaze preference. There is no ptosis. Facial sensation is intact. Facial musculature is asymmetric mild left central weakness.     Motor: There is normal bulk and tone.  There is no tremor.  Strength is 5/5 in the right arm and leg.   left hemiparesis    Sensation: left neglect    Cerebellar: There is no dysmetria on [facility], left side limited by weakness    Gait : deferred    LABS:                        8.4    12.56 )-----------( 371      ( 27 Nov 2023 03:44 )             25.8    11-27    143  |  108  |  60.1<H>  ----------------------------<  116<H>  4.5   |  21.0<L>  |  2.07<H>    Ca    8.8      27 Nov 2023 03:44  Phos  4.9     11-27  Mg     2.7     11-27    Lipid Profile (11.14.23 @ 14:00)    Cholesterol: 125 mg/dL   Triglycerides, Serum: 181 mg/dL   HDL Cholesterol: 31 mg/dL   Non HDL Cholesterol: 94:    LDL Cholesterol Calculated: 58 mg/dL    A1C with Estimated Average Glucose (11.14.23 @ 14:00)    A1C with Estimated Average Glucose Result: 5.6 %      RADIOLOGY & ADDITIONAL STUDIES (independently reviewed unless otherwise noted):    CT Head No Cont (11.27.23 @ 10:32) >  FINDINGS: Redemonstrated hemorrhagic focus along the anterior falx, minimally increased in size when compared prior imaging. Surrounding edematous change. Redemonstrated territory of infarction in the right MCA distribution. Scattered areas of increased attenuation along the cortex of the affected territory, likely a combination of residual hemorrhage, petechial hemorrhages, and laminar necrosis. Overall findings grossly stable compared to prior imaging. Areas of decreased attenuation scattered throughout the deep and periventricular white matter, compatible with chronic small vessel disease. Mild central parenchymal volume loss. Ventricular size grossly stable without she evidence of hydrocephalus.    US Duplex Carotid Arteries Complete, Bilateral (11.26.23 @ 18:34) >  IMPRESSION: No significant hemodynamic stenosis of the right carotid artery. Over 70% left internal carotid artery stenosis.    MR Head No Cont (11.13.23 @ 21:36) >  IMPRESSION:   RIGHT frontoparietal craniectomy with underlying intracranial hemorrhage and edema within the RIGHT frontal lobe. Overlying small RIGHT subdural hemorrhage is also unchanged. Scant   hemorrhage in the dependent portions of the BILATERAL lateral ventricles and minimal subarachnoid hemorrhage seen in the BILATERAL frontal and parietal lobes. Moderate periventricular and deep white matter ischemia is noted. Encephalomalacia and gliosis seen in the anterior frontal lobes bilaterally.Tiny acute lacunar infarction in the LEFT cerebellum and tiny acute cortical infarction in the RIGHT parietal lobe. Restricted diffusion also noted about the surgical cavity suggesting infarcted parenchyma.  --- End of Report ---    TTE Echo Complete w/o Contrast w/ Doppler (11.11.23 @ 08:58) >  Summary:   1. Endocardial visualization was enhanced with intravenous echo contrast.   2. Normal global left ventricular systolic function.   3. There is mild concentric left ventricular hypertrophy.   4. Left ventricular ejection fraction, by visual estimation, is 60 to 65%.   5. Spectral Doppler shows impaired relaxation pattern of left ventricular myocardial filling (Grade I diastolic dysfunction).   6. Elevated mean left atrial pressure.   7. Mildly enlarged right ventricle.   8. Normal left atrial size.   9. Normal right atrialsize.  10. Moderate paradoxial low gradient aortic stenosis.  11. The Dimesionless Index value is .39.  12. Moderately calcified aortic valve with mobile echodensity poorly visualized. May represent artifact vs mobile calcification vs vegetation. Consider WHIT if clinically indicated.  13. Mild aortic regurgitation.  14. Moderate thickening and calcification of the anterior and posterior mitral valve leaflets.  15. Mild tricuspid regurgitation.  < end of copied text >      CT Angio Head w/ IV Cont / CT Perfusion (11.10.23 @ 11:45) >  CTA BRAIN:  1.  No evidence of intracranial aneurysm, critical stenosis, or abrupt cut off of intraluminal contrast.  2.  No distinct opacification of the right sigmoid sinus. Although this is not a designated CTV study, an obstructive thrombus is not entirely excluded.    CTA NECK:  1.  Atherosclerotic calcification and plaque in the left carotid bulb/proximal ICA resulting in severe luminal narrowing. Recommend Doppler imaging for further evaluation of flow.  2.  Atherosclerotic calcification in the right vertebral artery ostium resulting in moderate severe luminal narrowing.  3.  Nodularity to the bilateral lobes of the thyroid. Consider nonemergent thyroid ultrasound for further characterization if clinically indicated.    CT PERFUSION:  1.  Increased CBF and CBV values in the anterior right MCA distribution, correlating with the known hemorrhagic infarction.  2.  Slightly increased Tmax values about the periphery of the infarction which may correlate with an ischemic penumbra.  --- End of Report ---    CT Brain Stroke Protocol (11.10.23 @ 09:19) >  IMPRESSION: Acute 6 cm right lateral frontal lobe parenchymal hemorrhage. Additional multifocal subarachnoid and subdural hemorrhage as described above. No cerebral herniation. Underlying mass cannot be ruled out. Recommend MRI brain with IV contrast when clinically feasible. Preliminary note, offical recommendations pending attending review/signature   [facility] Stroke Team  Progress Note     HPI:  83 year old Male with PMHx HTN, CAD s/p stents, renal cell carcinoma status post left nephrectomy, bladder CA, BPH, CHF, LBBB, vertigo,  HLD, 5.5cm AAA without rupture post EVAR, paroxysmal A-fib on Eliquis, Plavix, and aspirin, early stage Alzheimer dementia, transferred from [facility] on 11/10/23 s/p unwitnessed fall with head strike at home, found by wife on floor at 8am. Patient brought to urgent care by wife and had 5 staples to posterior scalp but was instructed to go to nearest ED.  CT head at [place] showed R frontal IPH, SDH, SAH at 9:00. s/p Right craniectomy for ICH 11/10/23. Found with LUE DVT, on Eliquis. Downgraded to stepdown on 11/23. Neurology consulted 11/25 to evaluate secondary stroke found on MRI brain. Re-upgraded to NSICU 11/26 for new acute anterior falx SDH.    SUBJECTIVE: No events overnight.  No new neurologic complaints.  ROS reported negative unless otherwise noted. Wife at bedside, patient sitting in recliner with helmet on    acetaminophen     Tablet .. 650 milliGRAM(s) Oral every 6 hours PRN  amantadine 100 milliGRAM(s) Oral <User Schedule>  atorvastatin 80 milliGRAM(s) Oral at bedtime  chlorhexidine 2% Cloths 1 Application(s) Topical <User Schedule>  diltiazem    Tablet 60 milliGRAM(s) Oral <User Schedule>  folic acid 1 milliGRAM(s) Oral daily  hydrALAZINE Injectable 10 milliGRAM(s) IV Push every 2 hours PRN  labetalol Injectable 10 milliGRAM(s) IV Push every 2 hours PRN  levalbuterol Inhalation 1.25 milliGRAM(s) Inhalation every 6 hours  melatonin 3 milliGRAM(s) Oral at bedtime  meropenem Injectable 1000 milliGRAM(s) IV Push every 12 hours  meropenem Injectable      metoprolol tartrate 50 milliGRAM(s) Oral <User Schedule>  Nephro-roma 1 Tablet(s) Oral daily  polyethylene glycol 3350 17 Gram(s) Oral daily  senna 2 Tablet(s) Oral at bedtime  sodium chloride 0.9%. 1000 milliLiter(s) IV Continuous <Continuous>  tamsulosin 0.8 milliGRAM(s) Oral at bedtime      PHYSICAL EXAM:   Vital Signs Last 24 Hrs  T(C): 37.3 (27 Nov 2023 11:00), Max: 37.9 (26 Nov 2023 16:00)  T(F): 99.1 (27 Nov 2023 11:00), Max: 100.2 (26 Nov 2023 16:00)  HR: 91 (27 Nov 2023 11:00) (79 - 114)  BP: 115/68 (27 Nov 2023 11:00) (104/67 - 134/65)  BP(mean): 82 (27 Nov 2023 11:00) (22 - 88)  RR: 19 (27 Nov 2023 11:00) (13 - 23)  SpO2: 100% (27 Nov 2023 11:00) (93% - 100%)    Parameters below as of 27 Nov 2023 08:46  Patient On (Oxygen Delivery Method): room air      General: No acute distress, sitting in recliner, helmet on    Detailed Neurologic Exam:    Mental status: The patient is awake and alert, attends to examiner.  The patient is oriented self only; unable to state place (hotel), year or current president.  The patient is able to name objects, follow commands, repeat sentences. He is hypophonic and dysarthric    Cranial nerves: Pupils equal and react symmetrically to light. +Left sided neglect, right gaze preference but able to cross midline to left. Decreased blink to threat on left. There is no ptosis. Facial sensation is intact. +Left facial weakness    Motor: Strength is 4+/5 in the right arm and 4-/5 in right leg. LUE 1/5. LLE 2/5.    Sensation: Extinction of left limbs on double simultaneous stimulation and light touch.    Cerebellar: There is no dysmetria on finger to nose testing on right, unable to assess left due to weakness    Gait : deferred    LABS:                        8.4    12.56 )-----------( 371      ( 27 Nov 2023 03:44 )             25.8    11-27    143  |  108  |  60.1<H>  ----------------------------<  116<H>  4.5   |  21.0<L>  |  2.07<H>    Ca    8.8      27 Nov 2023 03:44  Phos  4.9     11-27  Mg     2.7     11-27    Lipid Profile (11.14.23 @ 14:00)    Cholesterol: 125 mg/dL   Triglycerides, Serum: 181 mg/dL   HDL Cholesterol: 31 mg/dL   Non HDL Cholesterol: 94:    LDL Cholesterol Calculated: 58 mg/dL    A1C with Estimated Average Glucose (11.14.23 @ 14:00)    A1C with Estimated Average Glucose Result: 5.6 %      RADIOLOGY & ADDITIONAL STUDIES (independently reviewed unless otherwise noted):    CT Head No Cont (11.27.23 @ 10:32) >  FINDINGS: Redemonstrated hemorrhagic focus along the anterior falx, minimally increased in size when compared prior imaging. Surrounding edematous change. Redemonstrated territory of infarction in the right MCA distribution. Scattered areas of increased attenuation along the cortex of the affected territory, likely a combination of residual hemorrhage, petechial hemorrhages, and laminar necrosis. Overall findings grossly stable compared to prior imaging. Areas of decreased attenuation scattered throughout the deep and periventricular white matter, compatible with chronic small vessel disease. Mild central parenchymal volume loss. Ventricular size grossly stable without she evidence of hydrocephalus.    US Duplex Carotid Arteries Complete, Bilateral (11.26.23 @ 18:34) >  IMPRESSION: No significant hemodynamic stenosis of the right carotid artery. Over 70% left internal carotid artery stenosis.    MR Head No Cont (11.13.23 @ 21:36) >  IMPRESSION:   RIGHT frontoparietal craniectomy with underlying intracranial hemorrhage and edema within the RIGHT frontal lobe. Overlying small RIGHT subdural hemorrhage is also unchanged. Scant   hemorrhage in the dependent portions of the BILATERAL lateral ventricles and minimal subarachnoid hemorrhage seen in the BILATERAL frontal and parietal lobes. Moderate periventricular and deep white matter ischemia is noted. Encephalomalacia and gliosis seen in the anterior frontal lobes bilaterally.Tiny acute lacunar infarction in the LEFT cerebellum and tiny acute cortical infarction in the RIGHT parietal lobe. Restricted diffusion also noted about the surgical cavity suggesting infarcted parenchyma.  --- End of Report ---    TTE Echo Complete w/o Contrast w/ Doppler (11.11.23 @ 08:58) >  Summary:   1. Endocardial visualization was enhanced with intravenous echo contrast.   2. Normal global left ventricular systolic function.   3. There is mild concentric left ventricular hypertrophy.   4. Left ventricular ejection fraction, by visual estimation, is 60 to 65%.   5. Spectral Doppler shows impaired relaxation pattern of left ventricular myocardial filling (Grade I diastolic dysfunction).   6. Elevated mean left atrial pressure.   7. Mildly enlarged right ventricle.   8. Normal left atrial size.   9. Normal right atrialsize.  10. Moderate paradoxial low gradient aortic stenosis.  11. The Dimesionless Index value is .39.  12. Moderately calcified aortic valve with mobile echodensity poorly visualized. May represent artifact vs mobile calcification vs vegetation. Consider WHIT if clinically indicated.  13. Mild aortic regurgitation.  14. Moderate thickening and calcification of the anterior and posterior mitral valve leaflets.  15. Mild tricuspid regurgitation.  < end of copied text >      CT Angio Head w/ IV Cont / CT Perfusion (11.10.23 @ 11:45) >  CTA BRAIN:  1.  No evidence of intracranial aneurysm, critical stenosis, or abrupt cut off of intraluminal contrast.  2.  No distinct opacification of the right sigmoid sinus. Although this is not a designated CTV study, an obstructive thrombus is not entirely excluded.    CTA NECK:  1.  Atherosclerotic calcification and plaque in the left carotid bulb/proximal ICA resulting in severe luminal narrowing. Recommend Doppler imaging for further evaluation of flow.  2.  Atherosclerotic calcification in the right vertebral artery ostium resulting in moderate severe luminal narrowing.  3.  Nodularity to the bilateral lobes of the thyroid. Consider nonemergent thyroid ultrasound for further characterization if clinically indicated.    CT PERFUSION:  1.  Increased CBF and CBV values in the anterior right MCA distribution, correlating with the known hemorrhagic infarction.  2.  Slightly increased Tmax values about the periphery of the infarction which may correlate with an ischemic penumbra.  --- End of Report ---    CT Brain Stroke Protocol (11.10.23 @ 09:19) >  IMPRESSION: Acute 6 cm right lateral frontal lobe parenchymal hemorrhage. Additional multifocal subarachnoid and subdural hemorrhage as described above. No cerebral herniation. Underlying mass cannot be ruled out. Recommend MRI brain with IV contrast when clinically feasible. [facility] Stroke Team  Progress Note     HPI:  83 year old Male with PMHx HTN, CAD s/p stents, renal cell carcinoma status post left nephrectomy, bladder CA, BPH, CHF, LBBB, vertigo,  HLD, 5.5cm AAA without rupture post EVAR, paroxysmal A-fib on Eliquis, Plavix, and aspirin, early stage Alzheimer dementia, transferred from [facility] on 11/10/23 s/p unwitnessed fall with head strike at home, found by wife on floor at 8am. Patient brought to urgent care by wife and had 5 staples to posterior scalp but was instructed to go to nearest ED.  CT head at [place] showed R frontal IPH, SDH, SAH at 9:00. s/p Right craniectomy for ICH 11/10/23. Found with LUE DVT, on Eliquis. Downgraded to stepdown on 11/23. Neurology consulted 11/25 to evaluate secondary stroke found on MRI brain. Re-upgraded to NSICU 11/26 for new acute anterior falx SDH.    SUBJECTIVE: No events overnight.  No new neurologic complaints.  ROS reported negative unless otherwise noted. Wife at bedside, patient sitting in recliner with helmet on    acetaminophen     Tablet .. 650 milliGRAM(s) Oral every 6 hours PRN  amantadine 100 milliGRAM(s) Oral <User Schedule>  atorvastatin 80 milliGRAM(s) Oral at bedtime  chlorhexidine 2% Cloths 1 Application(s) Topical <User Schedule>  diltiazem    Tablet 60 milliGRAM(s) Oral <User Schedule>  folic acid 1 milliGRAM(s) Oral daily  hydrALAZINE Injectable 10 milliGRAM(s) IV Push every 2 hours PRN  labetalol Injectable 10 milliGRAM(s) IV Push every 2 hours PRN  levalbuterol Inhalation 1.25 milliGRAM(s) Inhalation every 6 hours  melatonin 3 milliGRAM(s) Oral at bedtime  meropenem Injectable 1000 milliGRAM(s) IV Push every 12 hours  meropenem Injectable      metoprolol tartrate 50 milliGRAM(s) Oral <User Schedule>  Nephro-roma 1 Tablet(s) Oral daily  polyethylene glycol 3350 17 Gram(s) Oral daily  senna 2 Tablet(s) Oral at bedtime  sodium chloride 0.9%. 1000 milliLiter(s) IV Continuous <Continuous>  tamsulosin 0.8 milliGRAM(s) Oral at bedtime      PHYSICAL EXAM:   Vital Signs Last 24 Hrs  T(C): 37.3 (27 Nov 2023 11:00), Max: 37.9 (26 Nov 2023 16:00)  T(F): 99.1 (27 Nov 2023 11:00), Max: 100.2 (26 Nov 2023 16:00)  HR: 91 (27 Nov 2023 11:00) (79 - 114)  BP: 115/68 (27 Nov 2023 11:00) (104/67 - 134/65)  BP(mean): 82 (27 Nov 2023 11:00) (22 - 88)  RR: 19 (27 Nov 2023 11:00) (13 - 23)  SpO2: 100% (27 Nov 2023 11:00) (93% - 100%)    Parameters below as of 27 Nov 2023 08:46  Patient On (Oxygen Delivery Method): room air      General: No acute distress, sitting in recliner, helmet on    Detailed Neurologic Exam:    Mental status: The patient is awake and alert, attends to examiner.  The patient is oriented self only; unable to state place (hotel), year or current president.  The patient is able to name objects, follow commands, repeat sentences. He is hypophonic and dysarthric    Cranial nerves: Pupils equal and react symmetrically to light. +Left sided neglect, right gaze preference but able to cross midline to left. Decreased blink to threat on left. There is no ptosis. Facial sensation is intact. +Left facial weakness    Motor: Strength is 4+/5 in the right arm and 4-/5 in right leg. LUE 1/5. LLE 2/5.    Sensation: Extinction of left limbs on double simultaneous stimulation and light touch.    Cerebellar: There is no dysmetria on finger to nose testing on right, unable to assess left due to weakness    Gait : deferred    LABS:                        8.4    12.56 )-----------( 371      ( 27 Nov 2023 03:44 )             25.8    11-27    143  |  108  |  60.1<H>  ----------------------------<  116<H>  4.5   |  21.0<L>  |  2.07<H>    Ca    8.8      27 Nov 2023 03:44  Phos  4.9     11-27  Mg     2.7     11-27    Lipid Profile (11.14.23 @ 14:00)    Cholesterol: 125 mg/dL   Triglycerides, Serum: 181 mg/dL   HDL Cholesterol: 31 mg/dL   Non HDL Cholesterol: 94:    LDL Cholesterol Calculated: 58 mg/dL    A1C with Estimated Average Glucose (11.14.23 @ 14:00)    A1C with Estimated Average Glucose Result: 5.6 %      RADIOLOGY & ADDITIONAL STUDIES (independently reviewed unless otherwise noted):    CT Head No Cont (11.27.23 @ 10:32) >  FINDINGS: Redemonstrated hemorrhagic focus along the anterior falx, minimally increased in size when compared prior imaging. Surrounding edematous change. Redemonstrated territory of infarction in the right MCA distribution. Scattered areas of increased attenuation along the cortex of the affected territory, likely a combination of residual hemorrhage, petechial hemorrhages, and laminar necrosis. Overall findings grossly stable compared to prior imaging. Areas of decreased attenuation scattered throughout the deep and periventricular white matter, compatible with chronic small vessel disease. Mild central parenchymal volume loss. Ventricular size grossly stable without she evidence of hydrocephalus.    US Duplex Carotid Arteries Complete, Bilateral (11.26.23 @ 18:34) >  IMPRESSION: No significant hemodynamic stenosis of the right carotid artery. Over 70% left internal carotid artery stenosis.    MR Head No Cont (11.13.23 @ 21:36) >  IMPRESSION:   RIGHT frontoparietal craniectomy with underlying intracranial hemorrhage and edema within the RIGHT frontal lobe. Overlying small RIGHT subdural hemorrhage is also unchanged. Scant   hemorrhage in the dependent portions of the BILATERAL lateral ventricles and minimal subarachnoid hemorrhage seen in the BILATERAL frontal and parietal lobes. Moderate periventricular and deep white matter ischemia is noted. Encephalomalacia and gliosis seen in the anterior frontal lobes bilaterally.Tiny acute lacunar infarction in the LEFT cerebellum and tiny acute cortical infarction in the RIGHT parietal lobe. Restricted diffusion also noted about the surgical cavity suggesting infarcted parenchyma.  --- End of Report ---    TTE Echo Complete w/o Contrast w/ Doppler (11.11.23 @ 08:58) >  Summary:   1. Endocardial visualization was enhanced with intravenous echo contrast.   2. Normal global left ventricular systolic function.   3. There is mild concentric left ventricular hypertrophy.   4. Left ventricular ejection fraction, by visual estimation, is 60 to 65%.   5. Spectral Doppler shows impaired relaxation pattern of left ventricular myocardial filling (Grade I diastolic dysfunction).   6. Elevated mean left atrial pressure.   7. Mildly enlarged right ventricle.   8. Normal left atrial size.   9. Normal right atrialsize.  10. Moderate paradoxial low gradient aortic stenosis.  11. The Dimesionless Index value is .39.  12. Moderately calcified aortic valve with mobile echodensity poorly visualized. May represent artifact vs mobile calcification vs vegetation. Consider WHIT if clinically indicated.  13. Mild aortic regurgitation.  14. Moderate thickening and calcification of the anterior and posterior mitral valve leaflets.  15. Mild tricuspid regurgitation.  < end of copied text >      CT Angio Head w/ IV Cont / CT Perfusion (11.10.23 @ 11:45) >  CTA BRAIN:  1.  No evidence of intracranial aneurysm, critical stenosis, or abrupt cut off of intraluminal contrast.  2.  No distinct opacification of the right sigmoid sinus. Although this is not a designated CTV study, an obstructive thrombus is not entirely excluded.    CTA NECK:  1.  Atherosclerotic calcification and plaque in the left carotid bulb/proximal ICA resulting in severe luminal narrowing. Recommend Doppler imaging for further evaluation of flow.  2.  Atherosclerotic calcification in the right vertebral artery ostium resulting in moderate severe luminal narrowing.  3.  Nodularity to the bilateral lobes of the thyroid. Consider nonemergent thyroid ultrasound for further characterization if clinically indicated.    CT PERFUSION:  1.  Increased CBF and CBV values in the anterior right MCA distribution, correlating with the known hemorrhagic infarction.  2.  Slightly increased Tmax values about the periphery of the infarction which may correlate with an ischemic penumbra.  --- End of Report ---    CT Brain Stroke Protocol (11.10.23 @ 09:19) >  IMPRESSION: Acute 6 cm right lateral frontal lobe parenchymal hemorrhage. Additional multifocal subarachnoid and subdural hemorrhage as described above. No cerebral herniation. Underlying mass cannot be ruled out. Recommend MRI brain with IV contrast when clinically feasible.

## 2023-11-27 NOTE — CHART NOTE - NSCHARTNOTEFT_GEN_A_CORE
Source: Patient [ ]  Family [ ]   other [x ] EMR and staff     Current Diet: Diet, Pureed:   Moderately Thick Liquids (MODTHICKLIQS)  Supplement Feeding Modality:  Oral  Ensure Enlive Cans or Servings Per Day:  2       Frequency:  Three Times a day (11-25-23 @ 10:31)    PO intake:  < 50% [x ]   50-75%  [ ]   %  [ ]  other :    Source for PO intake [ ] Patient [ ] family [x ] chart [ ] staff [ ] other    Current Weight:   11/23: 65.8 kg   11/20: 62.3 kg   11/16: 71.2 kg   11/14: 75.6 kg   11/11: 72.8 kg   (Generalized edema)     % Weight Change: 16lb(10%) weight loss since admission (if weights accurate)     Pertinent Medications: MEDICATIONS  (STANDING):  amantadine 100 milliGRAM(s) Oral <User Schedule>  atorvastatin 80 milliGRAM(s) Oral at bedtime  chlorhexidine 2% Cloths 1 Application(s) Topical <User Schedule>  diltiazem    Tablet 60 milliGRAM(s) Oral <User Schedule>  folic acid 1 milliGRAM(s) Oral daily  levalbuterol Inhalation 1.25 milliGRAM(s) Inhalation every 6 hours  melatonin 3 milliGRAM(s) Oral at bedtime  meropenem Injectable 1000 milliGRAM(s) IV Push every 12 hours  meropenem Injectable      metoprolol tartrate 50 milliGRAM(s) Oral <User Schedule>  Nephro-roma 1 Tablet(s) Oral daily  polyethylene glycol 3350 17 Gram(s) Oral daily  senna 2 Tablet(s) Oral at bedtime  sodium chloride 0.9%. 1000 milliLiter(s) (75 mL/Hr) IV Continuous <Continuous>  tamsulosin 0.8 milliGRAM(s) Oral at bedtime    MEDICATIONS  (PRN):  acetaminophen     Tablet .. 650 milliGRAM(s) Oral every 6 hours PRN Temp greater or equal to 38C (100.4F), Mild Pain (1 - 3)  hydrALAZINE Injectable 10 milliGRAM(s) IV Push every 2 hours PRN SBP>160  labetalol Injectable 10 milliGRAM(s) IV Push every 2 hours PRN SBP>160    Pertinent Labs: CBC Full  -  ( 27 Nov 2023 03:44 )  WBC Count : 12.56 K/uL  RBC Count : 2.71 M/uL  Hemoglobin : 8.4 g/dL  Hematocrit : 25.8 %  Platelet Count - Automated : 371 K/uL  Mean Cell Volume : 95.2 fl  Mean Cell Hemoglobin : 31.0 pg  Mean Cell Hemoglobin Concentration : 32.6 gm/dL  Auto Neutrophil # : x  Auto Lymphocyte # : x  Auto Monocyte # : x  Auto Eosinophil # : x  Auto Basophil # : x  Auto Neutrophil % : x  Auto Lymphocyte % : x  Auto Monocyte % : x  Auto Eosinophil % : x  Auto Basophil % : x    Skin: Stage 1 L butt, R crani site, L arm blisters, Stage 1 R butt     Nutrition focused physical exam previously conducted - found signs of malnutrition [ ]absent [x ]present    Subcutaneous fat loss: Mod [x ] Orbital fat pads region, [x ]Buccal fat region, [x ]Triceps region,  [x ]Ribs region    Muscle wasting: Mod [ x]Temples region, [ x]Clavicle region, [x ]Shoulder region, [ ]Scapula region, [ ]Interosseous region,  [ ]thigh region, [ ]Calf region    Hospital Course:   Pt is a 83 year old Male with PMHx HTN, CAD s/p stents, renal cell carcinoma status post left nephrectomy, bladder CA, BPH, CHF, LBBB, vertigo,  HLD, 5.5cm AAA without rupture post EVAR, paroxysmal A-fib on Eliquis, Plavix, and aspirin, early stage Alzheimer dementia, transferred from [facility] on 11/10/23 s/p unwitnessed fall with head strike at home, found by wife on floor at 8am. Patient brought to urgent care by wife and had 5 staples to posterior scalp but was instructed to go to nearest ED.  CT head at [place] showed R frontal IPH, SDH, SAH at 9:00. s/p Right craniectomy for ICH 11/10/23. Found with LUE DVT, on Eliquis. Downgraded to stepdown on 11/23. Neurology consulted 11/25 to evaluate secondary stroke found on MRI brain. Re-upgraded to NSICU 11/26 for new acute anterior falx SDH.      Estimated Needs:   [x ] no change since previous assessment  [ ] recalculated:     Current Nutrition Diagnosis:  Pt remains at high nutrition risk secondary to severe (acute) protein calorie malnutrition related to inability to meet sufficient protein energy needs in setting of TBI, - R frontal IPH, SDH, tSAH; s/p R decompressive hemicraniectomy 11/10 as evidenced by physical signs of mod muscle/fat loss, + edema and meeting < 50% estimated energy needs > 5days, weight loss. Pt had swallow evaluation on 11/23; pt tolerating dysphagia diet; with fair PO intake at this time. Pt requiring assistance with meals. Recommendations below:     Recommendations:   1. RX: MVI and Vit. C daily (500mg).   2. Check weight daily to monitor trends   3. Assistance with meals.   4. PO diet consistency per SLP Recommendations   5. Continue with Ensure TID     Monitoring and Evaluation:   [ x] PO intake [ x] Tolerance to diet prescription [X] Weights  [X] Follow up per protocol [X] Labs: Source: Patient [ ]  Family [ ]   other [x ] EMR and staff     Current Diet: Diet, Pureed:   Moderately Thick Liquids (MODTHICKLIQS)  Supplement Feeding Modality:  Oral  Ensure Enlive Cans or Servings Per Day:  2       Frequency:  Three Times a day (11-25-23 @ 10:31)    PO intake:  < 50% [x ]   50-75%  [ ]   %  [ ]  other :    Source for PO intake [ ] Patient [ ] family [x ] chart [ ] staff [ ] other    Current Weight:   11/23: 65.8 kg   11/20: 62.3 kg   11/16: 71.2 kg   11/14: 75.6 kg   11/11: 72.8 kg   (Generalized edema)     % Weight Change: 16lb(10%) weight loss since admission (if weights accurate)     Pertinent Medications: MEDICATIONS  (STANDING):  amantadine 100 milliGRAM(s) Oral <User Schedule>  atorvastatin 80 milliGRAM(s) Oral at bedtime  chlorhexidine 2% Cloths 1 Application(s) Topical <User Schedule>  diltiazem    Tablet 60 milliGRAM(s) Oral <User Schedule>  folic acid 1 milliGRAM(s) Oral daily  levalbuterol Inhalation 1.25 milliGRAM(s) Inhalation every 6 hours  melatonin 3 milliGRAM(s) Oral at bedtime  meropenem Injectable 1000 milliGRAM(s) IV Push every 12 hours  meropenem Injectable      metoprolol tartrate 50 milliGRAM(s) Oral <User Schedule>  Nephro-roma 1 Tablet(s) Oral daily  polyethylene glycol 3350 17 Gram(s) Oral daily  senna 2 Tablet(s) Oral at bedtime  sodium chloride 0.9%. 1000 milliLiter(s) (75 mL/Hr) IV Continuous <Continuous>  tamsulosin 0.8 milliGRAM(s) Oral at bedtime    MEDICATIONS  (PRN):  acetaminophen     Tablet .. 650 milliGRAM(s) Oral every 6 hours PRN Temp greater or equal to 38C (100.4F), Mild Pain (1 - 3)  hydrALAZINE Injectable 10 milliGRAM(s) IV Push every 2 hours PRN SBP>160  labetalol Injectable 10 milliGRAM(s) IV Push every 2 hours PRN SBP>160    Pertinent Labs: CBC Full  -  ( 27 Nov 2023 03:44 )  WBC Count : 12.56 K/uL  RBC Count : 2.71 M/uL  Hemoglobin : 8.4 g/dL  Hematocrit : 25.8 %  Platelet Count - Automated : 371 K/uL  Mean Cell Volume : 95.2 fl  Mean Cell Hemoglobin : 31.0 pg  Mean Cell Hemoglobin Concentration : 32.6 gm/dL  Auto Neutrophil # : x  Auto Lymphocyte # : x  Auto Monocyte # : x  Auto Eosinophil # : x  Auto Basophil # : x  Auto Neutrophil % : x  Auto Lymphocyte % : x  Auto Monocyte % : x  Auto Eosinophil % : x  Auto Basophil % : x    Skin: Stage 1 L butt, R crani site, L arm blisters, Stage 1 R butt     Nutrition focused physical exam previously conducted - found signs of malnutrition [ ]absent [x ]present    Subcutaneous fat loss: Mod [x ] Orbital fat pads region, [x ]Buccal fat region, [x ]Triceps region,  [x ]Ribs region    Muscle wasting: Mod [ x]Temples region, [ x]Clavicle region, [x ]Shoulder region, [ ]Scapula region, [ ]Interosseous region,  [ ]thigh region, [ ]Calf region    Hospital Course:   Pt is a 83 year old Male with PMHx HTN, CAD s/p stents, renal cell carcinoma status post left nephrectomy, bladder CA, BPH, CHF, LBBB, vertigo,  HLD, 5.5cm AAA without rupture post EVAR, paroxysmal A-fib on Eliquis, Plavix, and aspirin, early stage Alzheimer dementia, transferred from [facility] on 11/10/23 s/p unwitnessed fall with head strike at home, found by wife on floor at 8am. Patient brought to urgent care by wife and had 5 staples to posterior scalp but was instructed to go to nearest ED.  CT head at [place] showed R frontal IPH, SDH, SAH at 9:00. s/p Right craniectomy for ICH 11/10/23. Found with LUE DVT, on Eliquis. Downgraded to stepdown on 11/23. Neurology consulted 11/25 to evaluate secondary stroke found on MRI brain. Re-upgraded to NSICU 11/26 for new acute anterior falx SDH.      Estimated Needs:   [x ] no change since previous assessment  [ ] recalculated:     Current Nutrition Diagnosis:  Pt remains at high nutrition risk secondary to severe (acute) protein calorie malnutrition related to inability to meet sufficient protein energy needs in setting of TBI, - R frontal IPH, SDH, tSAH; s/p R decompressive hemicraniectomy 11/10 as evidenced by physical signs of mod muscle/fat loss, + edema and meeting < 50% estimated energy needs > 5days, weight loss. Pt had swallow evaluation on 11/23; pt tolerating dysphagia diet; with fair PO intake at this time. Pt requiring assistance with meals. Recommendations below:     Recommendations:   1. RX: Nephro-roma and folic acid daily   2. Check weight daily to monitor trends   3. Assistance with meals.   4. PO diet consistency per SLP Recommendations   5. Continue with Ensure TID     Monitoring and Evaluation:   [ x] PO intake [ x] Tolerance to diet prescription [X] Weights  [X] Follow up per protocol [X] Labs:

## 2023-11-27 NOTE — PROGRESS NOTE ADULT - NS ATTEND AMEND GEN_ALL_CORE FT
Seen and examined with the ACP team. Plan discussed with ACPs.    I agree with assessment and plan  as written with modifications made above.    will follow with you    Jaren Moreno MD PhD   [id number]

## 2023-11-27 NOTE — CONSULT NOTE ADULT - SUBJECTIVE AND OBJECTIVE BOX
Name: DEUCE BALL  Age: 83y  Gender: Male  Admitting Diagnosis: Nontraumatic intracerebral hemorrhage [I61.9]  NONTRAUMATIC INTRACEREBRAL HEMORRHAGE, UNSPECIFIED      Current Diagnoses:   HTN dx 2008  Bladder Cancer   TCC, dx 1999  Hyperlipemia dx 2008  Lt Renal Neoplasm - s/p left nephrectomy  Hx antineoplastic chemotherapy (BCG 2012)  Left bundle branch block  Vertigo  Heel spur, left  Abdominal aortic aneurysm  BPH (benign prostatic hyperplasia)  Renal mass, right  CAD (coronary artery disease)  Bladder cancer, primary, with metastasis from bladder to other site  Left kidney  Acute renal failure, unspecified acute renal failure type  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)  Congestive heart failure, unspecified chronicity, unspecified heart failure type  Alzheimers disease  Hemorrhage in the brain  Paroxysmal atrial fibrillation  DONNIE (acute kidney injury)  Abnormal echocardiogram  Endocarditis  Decompressive craniectomy  urethral Stent 7/2008 stent placement and removal  S/P Cystoscopy  bladder polyps removal multiple times (since 1999), 6/10 , 10/2011 & 10/17/2011, 5/12, 11/2012, last 5/13/13, 12/2013, 7/2014  S/P Tonsillectomy  History of Lt  Nephrectomy  6/10 - left  History of cystoscopy  April 2015  H/O abdominal aortic aneurysm repair  Presence of stent in LAD coronary artery  History of heart artery stent  DIONICIO  Endocarditis    HPI: Pt is an 82-year-old, right-handed male with a history of HTN, CAD s/p stents, renal cell carcinoma status post left nephrectomy, bladder CA, BPH, CHF, LBBB, vertigo, HLD, 5.5cm AAA without rupture post EVAR, paroxysmal A-fib on Eliquis, Plavix, and aspirin, early stage Alzheimer dementia.  transferred from [facility] s/p unwitnessed fall with head strike at home found by wife on floor at 8am. Patient brought to urgent care by wife and had 5 staples to posterior scalp but was instructed to go to nearest ED.  CT head at [place] showed R frontal IPH, SDH, SAH at 9:00. CTA stroke protocol not performed at [facility]. Patient BIB on 6L NC.  Pt GCS 15 on arrival, A&Ox2 on arrival. After initial assessment, patient noted to be vomiting enroute to CT scanner.          (10 Nov 2023 11:04)    Reason for Referral: To assess the nature and extent of any cognitive weaknesses and to inform treatment planning.  Current Medications: acetaminophen     Tablet .. 650 milliGRAM(s) Oral every 6 hours PRN Temp greater or equal to 38C (100.4F), Mild Pain (1 - 3)  amantadine 100 milliGRAM(s) Oral <User Schedule>  atorvastatin 80 milliGRAM(s) Oral at bedtime  chlorhexidine 2% Cloths 1 Application(s) Topical <User Schedule>  diltiazem    Tablet 60 milliGRAM(s) Oral <User Schedule>  folic acid 1 milliGRAM(s) Oral daily  hydrALAZINE Injectable 10 milliGRAM(s) IV Push every 2 hours PRN SBP>160  labetalol Injectable 10 milliGRAM(s) IV Push every 2 hours PRN SBP>160  levalbuterol Inhalation 1.25 milliGRAM(s) Inhalation every 6 hours  melatonin 3 milliGRAM(s) Oral at bedtime  meropenem Injectable 1000 milliGRAM(s) IV Push every 12 hours  meropenem Injectable      metoprolol tartrate 50 milliGRAM(s) Oral <User Schedule>  Nephro-roma 1 Tablet(s) Oral daily  polyethylene glycol 3350 17 Gram(s) Oral daily  senna 2 Tablet(s) Oral at bedtime  sodium chloride 0.9%. 1000 milliLiter(s) IV Continuous <Continuous>  tamsulosin 0.8 milliGRAM(s) Oral at bedtime    Neuroimaging: CT Head No Cont:   ACC: [id number] EXAM:  CT BRAIN   ORDERED BY: NUZHAT URBANO     PROCEDURE DATE:  11/23/2023          INTERPRETATION:  CLINICAL INDICATIONS:  f/u    COMPARISON: Head CT dated 11/22/2023    TECHNIQUE: Noncontrast CT of the head. Multiplanar reformations are   submitted.    FINDINGS:    Status post right-sided hemicraniectomy. Predominantly hypodense right   frontal temporal convexity subdural collection measuring 1.7 cm, similar   in appearance as compared to the prior study. Small areas of hemorrhage   in the posterior right frontal lobe, unchanged. Moderate areas of   low-attenuation throughout the right frontal lobe, unchanged. Small   subdural hemorrhage along the posterior falx. Thin subdural hemorrhage   along the right tentorial leaflet. Trace intraventricular hemorrhage in   the left occipital horn.    There is periventricular and subcortical white matter hypodensity without   mass effect, nonspecific, likely representing moderate right scalp   cutaneous staples. chronic microvascular ischemic changes. There is no   compelling evidence for an acute transcortical infarction. No   hydrocephalus. The orbits, mastoid air cells and visualized paranasal   sinuses are unremarkable. The calvarium is otherwise intact. Consider MRI   asclinically warranted.    IMPRESSION: Stable multicompartment intracranial hemorrhages. Stable   right-sided subdural collection.    --- End of Report ---            AUSTIN RIVERA MD; Attending Radiologist  This document has been electronically signed. Nov 23 2023 10:24AM (11-23-23 @ 02:24)    CT Head No Cont:   ACC: [id number] EXAM:  CT BRAIN   ORDERED BY: DESI HEATH     PROCEDURE DATE:  11/24/2023          INTERPRETATION:  CLINICAL INDICATION: Post right frontal craniectomy for   cranioplasty    Thin axial sections of the brain were obtained from baseto vertex,   without the intravenous administration of contrast material using the   stereotactic protocol. 5 mm axial coronal and sagittal instructed views   were obtained.    Comparison is made with the prior CT of 11/23/2023.    There is been a large right frontal temporal craniectomy. There is a   small low density subdural collection present. There is mixed density in   the right frontal parenchyma with a small amount of hemorrhage. Small   amount of hemorrhage in the anterior interhemispheric fissure along the   falx tentorium. Small left occipital subdural hematoma. Gliosis and   encephalomalacia right medial frontal cortex. Atrophy and small vessel   white matter ischemic changes.    IMPRESSION:    No change since 11/23/2023. Stereotactic protocol for cranioplasty.    --- End of Report ---            NHAN BEACH MD; Attending Radiologist  This document has been electronically signed. Nov 25 2023 11:20AM (11-24-23 @ 14:12)    CT Head No Cont:   ACC: [id number] EXAM:  CT BRAIN   ORDERED BY: ALICIA SMITH     PROCEDURE DATE:  11/26/2023          INTERPRETATION:  .    CLINICAL INFORMATION: Increased lethargy.    TECHNIQUE: Multiple axial CT images of the head were obtained without   contrast. Sagittal and coronal reconstructed images were acquired from   the source data.    COMPARISON: Most recent prior CT examination of the head from 11/24/2023   at 2:12 PM.    FINDINGS: Redemonstrated are right-sided hemicraniectomy postsurgical   changes.Low-density subdural collection is again seen which appears   unchanged.    Scattered areas of residual parenchymal hemorrhage within the right   lateral frontal lobe appear grossly unchanged. Encephalomalacia and   gliosis is again seen in these areas. Hemorrhage along the anterior   aspect of the falx appears slightly increased in size in comparison with   the prior study.    Previously seen small left occipital convexity subdural hemorrhage   appears unchanged.    No new areas of acute intracranial hemorrhage are seen.    There is no shift of the midline structures or herniation.    There is no hydrocephalus.    There is diffuse cerebral volume loss with prominence of the sulci,   fissures, and cisternal spaces which is normal for the patient's age.   There is extensive patchy confluent periventricular white matter   hypoattenuation statistically compatible with microvascular type changes   given calcific atherosclerotic disease of the intracranial arteries.    The paranasal sinuses and tympanomastoid cavities are clear. The orbits   appear unremarkable.    IMPRESSION: Slight interval enlargement of hemorrhage along the anterior   falx when compared with 11/24/2023. Recommend short-term interval   follow-up CT examination.    Otherwise, stable examination.    --- End of Report ---            SVITLANA BRAGG MD; Attending Radiologist  This document has been electronically signed. Nov 26 2023  6:15PM (11-26-23 @ 17:43)    CT Head No Cont:   ACC: [id number] EXAM:  CT BRAIN   ORDERED BY: TRUNG SLATER     PROCEDURE DATE:  11/26/2023          INTERPRETATION:  .    CLINICAL INFORMATION: Follow-up anterior falx subdural hematoma.    TECHNIQUE: Multiple axial CT images of the head were obtained without   contrast. Sagittal and coronal reconstructed images were acquired from   the source data.    COMPARISON: Most recent prior head CT exam from 11/26/2023 at 5:43 PM.   Prior CT examination of the head from 11/24/2023 at 2:12 PM.    FINDINGS: Previously noted area of hemorrhage involving the anterior falx   appears grossly unchanged when compared to the most recent prior CT exam   but appears mildly enlarged when compared with 11/24/2023 at 2:12 PM.    Other scattered areas of hemorrhage within the right lateral frontal lobe   appear unchanged. Underlying low-attenuation change is similar.    Again seen are right-sided hemicraniectomy postsurgical changes. A   low-density subdural collection subjacent to the craniectomy site appears  unchanged.    There is no shift of the midline structures or herniation.    No obstructive hydrocephalus is seen.    There is diffuse cerebral volume loss with prominence of the sulci,   fissures, and cisternal spaces which is normal for the patient's age.   There is moderate patchy confluent periventricular white matter   hypoattenuation statistically compatible with microvascular type changes   given calcific atherosclerotic disease of the intracranial arteries.    The paranasal sinuses and tympanomastoid cavities are clear. The orbits   appear unremarkable.    IMPRESSION: Overall stable follow-up CT exam when compared with the most   recent prior CT study.    --- End of Report ---            SVITLANA BRAGG MD; Attending Radiologist  This document has been electronically signed. Nov 26 2023 10:55PM (11-26-23 @ 21:59)    CT Head No Cont:   ACC: [id number] EXAM:  CT BRAIN   ORDERED BY: MERLYN RICHARDSON     PROCEDURE DATE:  11/27/2023          INTERPRETATION:  EXAM: CT HEAD WITHOUT INTRAVENOUS CONTRAST    HISTORY: History of subdural hematoma, follow-up imaging    TECHNIQUE: Multiple axial images were obtained from the skull base to the   vertex. Sagittal and coronal reformatted images were obtained from the   axial data set. The images were reviewed in brain and bone windows.    COMPARISON: CT of the head November 26, 2023    FINDINGS:    Redemonstrated hemorrhagic focus along the anterior falx, minimally   increased in size when compared prior imaging. Surrounding edematous   change.    Redemonstrated territory of infarction in the right MCA distribution.   Scattered areas of increased attenuation along the cortex of the affected   territory, likely a combination of residual hemorrhage, petechial   hemorrhages, and laminar necrosis. Overall findings grossly stable   compared to prior imaging. Areas of decreased attenuation scattered   throughout the deep and periventricular white matter, compatible with   chronic small vessel disease. Mild central parenchymal volume loss.   Ventricular size grossly stable without she evidence of hydrocephalus.    Grossly stable appearing hypodense collection overlying the right   cerebral convexity. No significant mass effect or midline shift.   Postoperative changes related to a right calvarial craniectomy.    The cranial cervical junction is within normal limits. The sella is not   expanded. No depressed calvarial fracture. The visualized paranasal   sinuses are well aerated. The visualized mastoid air cells are well   aerated. The visualized orbits are within normal limits.    IMPRESSION:    Grossly stable CT examination ofthe head when compared prior imaging.    --- End of Report ---    TAMMIE FARRELL MD; Attending Radiologist  This document has been electronically signed. Nov 27 2023 10:43AM (11-27-23 @ 10:32)        Clinical Interview:     Social History:    MSE:  Appearance-  Behavior-  Motor-  Affect-  Mood-  Speech -  Thought Process-  Thought Content-   Effort -     Measures Administered:    Results Summary:           Name: DEUCE BALL  Age: 83y  Gender: Male  Admitting Diagnosis: Nontraumatic intracerebral hemorrhage [I61.9]  NONTRAUMATIC INTRACEREBRAL HEMORRHAGE, UNSPECIFIED      Current Diagnoses:   HTN dx 2008  Bladder Cancer   TCC, dx 1999  Hyperlipemia dx 2008  Lt Renal Neoplasm - s/p left nephrectomy  Hx antineoplastic chemotherapy (BCG 2012)  Left bundle branch block  Vertigo  Heel spur, left  Abdominal aortic aneurysm  BPH (benign prostatic hyperplasia)  Renal mass, right  CAD (coronary artery disease)  Bladder cancer, primary, with metastasis from bladder to other site  Left kidney  Acute renal failure, unspecified acute renal failure type  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)  Congestive heart failure, unspecified chronicity, unspecified heart failure type  Alzheimers disease  Hemorrhage in the brain  Paroxysmal atrial fibrillation  DONNIE (acute kidney injury)  Abnormal echocardiogram  Endocarditis  Decompressive craniectomy  urethral Stent 7/2008 stent placement and removal  S/P Cystoscopy  bladder polyps removal multiple times (since 1999), 6/10 , 10/2011 & 10/17/2011, 5/12, 11/2012, last 5/13/13, 12/2013, 7/2014  S/P Tonsillectomy  History of Lt  Nephrectomy  6/10 - left  History of cystoscopy  April 2015  H/O abdominal aortic aneurysm repair  Presence of stent in LAD coronary artery  History of heart artery stent  DIONICIO  Endocarditis    HPI: Pt is an 82-year-old, right-handed male with a history of HTN, CAD s/p stents, renal cell carcinoma status post left nephrectomy, bladder CA, BPH, CHF, LBBB, vertigo, HLD, 5.5cm AAA without rupture post EVAR, paroxysmal A-fib on Eliquis, Plavix, and aspirin, early stage Alzheimer dementia. On 11/10/2023 he was transferred from [facility] s/p unwitnessed fall with head strike at home found by wife on floor at 8am. CT head at [place] showed right frontal IPH, SDH, SAH at 9:00. CTA stroke protocol not performed at [facility]. Patient BIB on 6L NC.  Pt GCS 15 on arrival, A&Ox2 on arrival. He underwent right decompressive hemicraniectomy on 11/10/2023. On 11/25/2023, MRI of brain revealed secondary stroke.     Reason for Referral: To assess the nature and extent of any cognitive weaknesses and to inform treatment planning.    Current Medications: acetaminophen     Tablet .. 650 milliGRAM(s) Oral every 6 hours PRN Temp greater or equal to 38C (100.4F), Mild Pain (1 - 3)  amantadine 100 milliGRAM(s) Oral <User Schedule>  atorvastatin 80 milliGRAM(s) Oral at bedtime  chlorhexidine 2% Cloths 1 Application(s) Topical <User Schedule>  diltiazem    Tablet 60 milliGRAM(s) Oral <User Schedule>  folic acid 1 milliGRAM(s) Oral daily  hydrALAZINE Injectable 10 milliGRAM(s) IV Push every 2 hours PRN SBP>160  labetalol Injectable 10 milliGRAM(s) IV Push every 2 hours PRN SBP>160  levalbuterol Inhalation 1.25 milliGRAM(s) Inhalation every 6 hours  melatonin 3 milliGRAM(s) Oral at bedtime  meropenem Injectable 1000 milliGRAM(s) IV Push every 12 hours  meropenem Injectable      metoprolol tartrate 50 milliGRAM(s) Oral <User Schedule>  Nephro-roma 1 Tablet(s) Oral daily  polyethylene glycol 3350 17 Gram(s) Oral daily  senna 2 Tablet(s) Oral at bedtime  sodium chloride 0.9%. 1000 milliLiter(s) IV Continuous <Continuous>  tamsulosin 0.8 milliGRAM(s) Oral at bedtime    Neuroimaging:   CT BRAIN 11/10/2023    IMPRESSION:  1.Large parenchymal hemorrhage centered in the right frontal lobe   resulting in midline shift to the left of approximately 0.5 cm.  2.  Multiple extra-axial hematomas as discussed above.  3.  Scattered subarachnoid blood along the right cerebral hemisphere.    CT BRAIN 11/12/2023  IMPRESSION: Status post evacuation of a right-sided intraparenchymal   hemorrhage. Multicompartment intracranial hemorrhages improved to stable.   Status post right-sided subdural drainage catheter removal. New small   hypodense subdural collection at the craniectomy site.    MR BRAIN 11/13/2023    IMPRESSION:   RIGHT frontoparietal craniectomy with underlying   intracranial hemorrhage and edema within the RIGHT frontal lobe.   Overlying small RIGHT subdural hemorrhage is also unchanged. Scant   hemorrhage in the dependent portions of the BILATERAL lateral ventricles   and minimal subarachnoid hemorrhage seen in the BILATERAL frontal and   parietal lobes. Moderate periventricular and deep white matter ischemia   is noted. Encephalomalacia and gliosis seen in the anterior frontal lobes   bilaterally.Tiny acute lacunar infarction in the LEFT cerebellum and   tiny acute cortical infarction in the RIGHT parietal lobe. Restricted   diffusion also noted about the surgical cavity suggesting infarcted   parenchyma.    CT BRAIN 11/16/2023  IMPRESSION: Improved to stable multicompartment intracranial hemorrhages.   Right-sided subdural hygroma, larger in size.    CT BRAIN 11/22/2023  IMPRESSION: Mixed attenuating subdural hematoma again seen with some   expected postoperative changes    Evolving area of parenchymal hemorrhage involving the right frontal   region.    CT Head No Cont 11/23/2023    IMPRESSION: Stable multicompartment intracranial hemorrhages. Stable   right-sided subdural collection.    CT BRAIN 11/24/2023    No change since 11/23/2023. Stereotactic protocol for cranioplasty.    CT BRAIN 11/26/2023    IMPRESSION: Slight interval enlargement of hemorrhage along the anterior   falx when compared with 11/24/2023. Recommend short-term interval   follow-up CT examination.    CT BRAIN 11/26/2023    IMPRESSION: Overall stable follow-up CT exam when compared with the most   recent prior CT study.    CT BRAIN 11/27/2023    IMPRESSION: Grossly stable CT examination of the head when compared prior imaging.    Clinical Interview: Pt reportedly required assistance across ADLs/IADLs prior to his hospitalization. He     Social History: Pt reportedly completed high school, and held several jobs in his past.    MSE:  Appearance-  Behavior-  Motor-  Affect-  Mood-  Speech -  Thought Process-  Thought Content-   Effort -     Measures Administered:    Results Summary:           Name: DEUCE BALL  Age: 83y  Gender: Male  Admitting Diagnosis: Nontraumatic intracerebral hemorrhage [I61.9]  NONTRAUMATIC INTRACEREBRAL HEMORRHAGE, UNSPECIFIED      Current Diagnoses:   HTN dx 2008  Bladder Cancer   TCC, dx 1999  Hyperlipemia dx 2008  Lt Renal Neoplasm - s/p left nephrectomy  Hx antineoplastic chemotherapy (BCG 2012)  Left bundle branch block  Vertigo  Heel spur, left  Abdominal aortic aneurysm  BPH (benign prostatic hyperplasia)  Renal mass, right  CAD (coronary artery disease)  Bladder cancer, primary, with metastasis from bladder to other site  Left kidney  Acute renal failure, unspecified acute renal failure type  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)  Congestive heart failure, unspecified chronicity, unspecified heart failure type  Alzheimers disease  Hemorrhage in the brain  Paroxysmal atrial fibrillation  DONNIE (acute kidney injury)  Abnormal echocardiogram  Endocarditis  Decompressive craniectomy  urethral Stent 7/2008 stent placement and removal  S/P Cystoscopy  bladder polyps removal multiple times (since 1999), 6/10 , 10/2011 & 10/17/2011, 5/12, 11/2012, last 5/13/13, 12/2013, 7/2014  S/P Tonsillectomy  History of Lt  Nephrectomy  6/10 - left  History of cystoscopy  April 2015  H/O abdominal aortic aneurysm repair  Presence of stent in LAD coronary artery  History of heart artery stent  DIONICIO  Endocarditis    HPI: Pt is an 82-year-old, right-handed male with a history of HTN, CAD s/p stents, renal cell carcinoma status post left nephrectomy, bladder CA, BPH, CHF, LBBB, vertigo, HLD, 5.5cm AAA without rupture post EVAR, paroxysmal A-fib on Eliquis, Plavix, and aspirin, early stage Alzheimer dementia. On 11/10/2023 he was transferred from [facility] s/p unwitnessed fall with head strike at home found by wife on floor at 8am. CT head at [place] showed right frontal IPH, SDH, SAH at 9:00. CTA stroke protocol not performed at [facility]. Patient BIB on 6L NC.  Pt GCS 15 on arrival, A&Ox2 on arrival. He underwent right decompressive hemicraniectomy on 11/10/2023. On 11/25/2023, MRI of brain revealed enlargement of anterior falx hemorrhage.    Reason for Referral: To assess the nature and extent of any cognitive weaknesses and to inform treatment planning.    Current Medications: acetaminophen     Tablet .. 650 milliGRAM(s) Oral every 6 hours PRN Temp greater or equal to 38C (100.4F), Mild Pain (1 - 3)  amantadine 100 milliGRAM(s) Oral <User Schedule>  atorvastatin 80 milliGRAM(s) Oral at bedtime  chlorhexidine 2% Cloths 1 Application(s) Topical <User Schedule>  diltiazem    Tablet 60 milliGRAM(s) Oral <User Schedule>  folic acid 1 milliGRAM(s) Oral daily  hydrALAZINE Injectable 10 milliGRAM(s) IV Push every 2 hours PRN SBP>160  labetalol Injectable 10 milliGRAM(s) IV Push every 2 hours PRN SBP>160  levalbuterol Inhalation 1.25 milliGRAM(s) Inhalation every 6 hours  melatonin 3 milliGRAM(s) Oral at bedtime  meropenem Injectable 1000 milliGRAM(s) IV Push every 12 hours  meropenem Injectable      metoprolol tartrate 50 milliGRAM(s) Oral <User Schedule>  Nephro-roma 1 Tablet(s) Oral daily  polyethylene glycol 3350 17 Gram(s) Oral daily  senna 2 Tablet(s) Oral at bedtime  sodium chloride 0.9%. 1000 milliLiter(s) IV Continuous <Continuous>  tamsulosin 0.8 milliGRAM(s) Oral at bedtime    Neuroimaging:   CT BRAIN 11/10/2023    IMPRESSION:  1.Large parenchymal hemorrhage centered in the right frontal lobe   resulting in midline shift to the left of approximately 0.5 cm.  2.  Multiple extra-axial hematomas as discussed above.  3.  Scattered subarachnoid blood along the right cerebral hemisphere.    CT BRAIN 11/12/2023  IMPRESSION: Status post evacuation of a right-sided intraparenchymal   hemorrhage. Multicompartment intracranial hemorrhages improved to stable.   Status post right-sided subdural drainage catheter removal. New small   hypodense subdural collection at the craniectomy site.    MR BRAIN 11/13/2023    IMPRESSION:   RIGHT frontoparietal craniectomy with underlying   intracranial hemorrhage and edema within the RIGHT frontal lobe.   Overlying small RIGHT subdural hemorrhage is also unchanged. Scant   hemorrhage in the dependent portions of the BILATERAL lateral ventricles   and minimal subarachnoid hemorrhage seen in the BILATERAL frontal and   parietal lobes. Moderate periventricular and deep white matter ischemia   is noted. Encephalomalacia and gliosis seen in the anterior frontal lobes   bilaterally.Tiny acute lacunar infarction in the LEFT cerebellum and   tiny acute cortical infarction in the RIGHT parietal lobe. Restricted   diffusion also noted about the surgical cavity suggesting infarcted   parenchyma.    CT BRAIN 11/16/2023  IMPRESSION: Improved to stable multicompartment intracranial hemorrhages.   Right-sided subdural hygroma, larger in size.    CT BRAIN 11/22/2023  IMPRESSION: Mixed attenuating subdural hematoma again seen with some   expected postoperative changes    Evolving area of parenchymal hemorrhage involving the right frontal   region.    CT Head No Cont 11/23/2023    IMPRESSION: Stable multicompartment intracranial hemorrhages. Stable   right-sided subdural collection.    CT BRAIN 11/24/2023    No change since 11/23/2023. Stereotactic protocol for cranioplasty.    CT BRAIN 11/26/2023    IMPRESSION: Slight interval enlargement of hemorrhage along the anterior   falx when compared with 11/24/2023. Recommend short-term interval   follow-up CT examination.    CT BRAIN 11/26/2023    IMPRESSION: Overall stable follow-up CT exam when compared with the most   recent prior CT study.    CT BRAIN 11/27/2023    IMPRESSION: Grossly stable CT examination of the head when compared prior imaging.    Clinical Interview: Pt reportedly required assistance from his romantic partner across ADLs/IADLs prior to his hospitalization. He was unable to identify his reason for hospitalization. He was unable to recall whether or not he has previously been treated by a neurologist for cognitive difficulties. He denied any prior psychiatric history, and denied current symptoms of depression or anxiety.     Social History: Pt reportedly completed high school, and was a good student. He held several jobs in the past. Records indicate that he previously worked as a .    MSE:  Appearance- Appropriately dressed and groomed.  Behavior- Polite, cooperative. Appeared somnolent at times, though he easily re-engaged throughout the brief evaluation.  Affect- Appropriate, constricted.  Mood- Euthymic.  Speech - Low volume; rate, rhythm, and prosody were WNL.  Thought Process- Linear.  Thought Content- WNL.  Effort - Adequate.  Other - Pt attempted to reach for examiner's water bottle at multiple points, despite reminders from examiner that it was not his drink.    Measures Administered: Orientation; WAIS-IV Digit Span Subtest; RBANS Naming; BDAE-3-SF Repetition, Commands, Complex Ideational Material; Drilled Word Span    Results Summary: Orientation was impaired, as he was unable to correctly identify his age, the date, day of the week, month, year, hospital location, or current President. Basic auditory attention and working memory were slightly weak. Confrontation naming, comprehension of simple commands, and comprehension of complex information were below expectations. Repetition was intact. Learning of verbal information was within normal limits, and he was able to recall the information after a brief delay without distraction. Delayed recall was susceptible to retroactive interference, and he was unable to recall 0/4 previously-learned words. Recognition of the information was impaired, as he made multiple false positive errors.

## 2023-11-27 NOTE — PROGRESS NOTE ADULT - ATTENDING COMMENTS
Patient seen and examined, discussed patient with Dr. Tovar and agree with recommendations.    Rehab/Impaired mobility and function - Patient continues to require hospitalization for the above diagnoses and ongoing active management of comorbid complications (ICU level care) that are substantially impairing functional ability and impairing quality of life necessitating ongoing medical management of these complications.      When medically optimized, based on the patient's diagnosis, current functional status, and potential for progress, recommend HOLLY, patient DOES NOT meet acute inpatient rehabilitation criteria. Patient needs a more prolonged stay to achieve transition to community living and would not be able to tolerate a comprehensive/intense rehab program of 3hours/day.     Will continue to follow. Rehab recommendations are dependent on how functional progress changes as well as how patient continues to participate and tolerate therapeutic interventions, which may change. Recommend ongoing mobilization by staff to maintain cardiopulmonary function and prevention of secondary complications related to debility. Discussed the specific management and recommendations above with rehab clinical care team/rehab liaison.

## 2023-11-27 NOTE — CONSULT NOTE ADULT - ASSESSMENT
Results from the brief cognitive screen revealed weaknesses in orientation, aspects of language, and memory. The pt's current cognitive profile is generally consistent with his history of stroke, superimposed on pre-existing cognitive impairments. However, given the observed weaknesses in orientation, it is also possible that he is experiencing the cognitive symptoms of delirium.     Recommendations:   1. Continued monitoring by Neuropsychology to assess changes in his cognition throughout his hospitalization.  2. Pt's family and care team should provide frequent orientation cues to pt.   3. Pt should remain on consistent sleep-wake schedule. His room should be well-lit throughout the day, and naps should be avoided throughout the day.

## 2023-11-27 NOTE — PROGRESS NOTE ADULT - ASSESSMENT
ASSESSMENT: 83 year old Male with PMHx HTN, CAD s/p stents, renal cell carcinoma status post left nephrectomy, bladder CA, BPH, CHF, LBBB, vertigo,  HLD, 5.5cm AAA without rupture post EVAR, paroxysmal A-fib on Eliquis, Plavix, and aspirin, early stage Alzheimer dementia, transferred from [facility] on 11/10/23 s/p unwitnessed fall with head strike at home, found by wife on floor at 8am. Patient brought to urgent care by wife and had 5 staples to posterior scalp but was instructed to go to nearest ED.  CT head at [facility] showed R frontal IPH, SDH, SAH at 9:00. s/p Right craniectomy for ICH 11/10/23. Found with LUE DVT, on Eliquis. Downgraded to stepdown on 11/23. Neurology consulted 11/25 to evaluate secondary stroke found on MRI brain which was ordered to evaluate for infection. Re-upgraded to NSICU 11/26 for new acute anterior falx SDH, Eliquis and ASA held per Neurosurgery. Etiology of infarcts likely embolic as bilateral as well as suggestive findings of mobile echodensity on aortic valve. Currently pending family decision regarding WHIT for further stroke workup.    PRELIM PLAN PENDING VISIT    NEURO:   -Neurologically ---   -Continue close monitoring for neurologic deterioration, low threshold for repeat CTH if any change    -Stroke neuro checks q 2  -Right frontal IPH/SDH/SAH s/p Right craniectomy 11/10/23 with new SDH on 11/26 - recs per Neurosurgery  -MRI brain as above  -Carotid duplex - 70% left ICA stenosis   -Dysphagia screen: passed  -Physical therapy/OT/Speech eval/treatment.   #Stroke prevention:  -SBP goal <140  -ANTITHROMBOTIC THERAPY: ASA/Eliquis on hold per Neurosurgery due to new SDH on CTH 11/26 - resume after Neurosurgical clearance  -LDL 58, on lipitor 80mg; titrate statin to LDL goal less than 70  -HA1C 5.6    CARDIOVASCULAR:   #Afib  #mobile echodensity on AV on TTE  -TTE as above  -blood cultures negative to date  -Cardiology recommendations appreciated  -Eliquis on hold for Afib/DVT due to new SDH as per Neurosurgery  -consider WHIT for further evaluation of AV - pending family decision  -cardiac monitoring w/ telemetry for now                 HEMATOLOGY:   H/H 8.4/25.8, Platelets 371   -thrombocytopenia s/p 1Unit Platelets on 11/14  -monitor H/H  -LUE DVT - Eliquis on hold at this time per Neurosurgery  -patient should have all age and risk appropriate malignancy screenings with PCP or sooner if clinically suspected      DVT ppx: Heparin s.c [] LMWH [] SCDs [X]    PULMONARY:   -most recent CXR 11/20 negative for acute process  -protecting airway, saturating well     RENAL:   BUN/Cr 60.1/2.07  -monitor urine output, maintain adequate hydration, avoid nephrotoxic medications/renally dose meds     Na Goal:  135-145     [place]: present; void trial when appropriate per primary team    ID:   -blood cultures negative to date  -ID recommendations appreciated  -afebrile  -leukocytosis downtrending  -infectious workup and management per primary team     OTHER:  condition and plan of care d/w patient, questions and concerns addressed.     DISPOSITION: Rehab or home depending on PT eval once stable and workup is complete      CORE MEASURES:        Admission NIHSS: n/a     Tenecteplase : [] YES [X] NO      LDL/HDL/A1C: 58/31/5.6     Depression Screen- if depression hx and/or present      Statin Therapy: Lipitor 80mg     Dysphagia Screen: [X] PASS [] FAIL     Smoking [] YES [X] NO      Afib [X] YES [] NO     Stroke Education [] YES [] NO [X] ordered, pending    Obtain screening lower extremity venous ultrasound in patients who meet 1 or more of the following criteria as patient is high risk for DVT/PE on admission:   [X] History of DVT/PE  []Hypercoagulable states (Factor V Leiden, Cancer, OCP, etc. )  [X]Prolonged immobility (hemiplegia/hemiparesis/post operative or any other extended immobilization)  [] Transferred from outside facility (Rehab or Long term care)  [] Age </= to 50 ASSESSMENT: 83 year old Male with PMHx HTN, CAD s/p stents, renal cell carcinoma status post left nephrectomy, bladder CA, BPH, CHF, LBBB, vertigo,  HLD, 5.5cm AAA without rupture post EVAR, paroxysmal A-fib on Eliquis, Plavix, and aspirin, early stage Alzheimer dementia, transferred from [facility] on 11/10/23 s/p unwitnessed fall with head strike at home, found by wife on floor at 8am. Patient brought to urgent care by wife and had 5 staples to posterior scalp but was instructed to go to nearest ED.  CT head at [place] showed R frontal IPH, SDH, SAH at 9:00. s/p Right craniectomy for ICH 11/10/23. Found with LUE DVT, on Eliquis. Downgraded to stepdown on 11/23. Neurology consulted 11/25 to evaluate secondary stroke found on MRI brain which was ordered to evaluate for infection. Re-upgraded to NSICU 11/26 for new acute anterior falx SDH, Eliquis and ASA held per Neurosurgery. Etiology of infarcts likely embolic as bilateral as well as suggestive findings of mobile echodensity on aortic valve. Currently pending family decision regarding WHIT for further stroke workup.      NEURO:   -Neurologically unchanged, with left hemiparesis and left neglect   -Continue close monitoring for neurologic deterioration, low threshold for repeat CTH if any change    -Stroke neuro checks q 2/per Neurosurgery  -Right frontal IPH/SDH/SAH s/p Right craniectomy 11/10/23 with new SDH on 11/26 - recs per Neurosurgery  -MRI brain as above  -Carotid duplex - 70% left ICA stenosis   -Dysphagia screen: passed  -Physical therapy/OT/Speech eval/treatment.   #Stroke prevention:  -SBP goal <140  -ANTITHROMBOTIC THERAPY: ASA/Eliquis on hold per Neurosurgery due to new SDH on CTH 11/26 - resume after Neurosurgical clearance  -LDL 58, on lipitor 80mg; titrate statin to LDL goal less than 70  -HA1C 5.6    CARDIOVASCULAR:   #Afib  #mobile echodensity on AV on TTE  -TTE as above  -blood cultures negative to date  -Cardiology recommendations appreciated  -Eliquis on hold for Afib/DVT due to new SDH as per Neurosurgery  -consider WHIT for further evaluation of AV - pending family decision  -cardiac monitoring w/ telemetry for now                 HEMATOLOGY:   H/H 8.4/25.8, Platelets 371   -thrombocytopenia s/p 1Unit Platelets on 11/14  -monitor H/H  -LUE DVT - Eliquis on hold at this time per Neurosurgery  -IR consulted for possible IVC filter if unable to be anticoagulated  -patient should have all age and risk appropriate malignancy screenings with PCP or sooner if clinically suspected      DVT ppx: Heparin s.c [] LMWH [] SCDs [X]    PULMONARY:   -most recent CXR 11/20 negative for acute process  -protecting airway, saturating well     RENAL:   BUN/Cr 60.1/2.07  -monitor urine output, maintain adequate hydration, avoid nephrotoxic medications/renally dose meds     Na Goal:  135-145     [place]: present; void trial when appropriate per primary team    ID:   -blood cultures negative to date  -ID recommendations appreciated  -afebrile  -leukocytosis downtrending  -infectious workup and management per primary team     OTHER:  condition and plan of care d/w patient, questions and concerns addressed.     DISPOSITION: Rehab or home depending on PT eval once stable and workup is complete      CORE MEASURES:        Admission NIHSS: n/a     Tenecteplase : [] YES [X] NO      LDL/HDL/A1C: 58/31/5.6     Depression Screen- if depression hx and/or present      Statin Therapy: Lipitor 80mg     Dysphagia Screen: [X] PASS [] FAIL     Smoking [] YES [X] NO      Afib [X] YES [] NO     Stroke Education [] YES [] NO [X] ordered, pending    Obtain screening lower extremity venous ultrasound in patients who meet 1 or more of the following criteria as patient is high risk for DVT/PE on admission:   [X] History of DVT/PE  []Hypercoagulable states (Factor V Leiden, Cancer, OCP, etc. )  [X]Prolonged immobility (hemiplegia/hemiparesis/post operative or any other extended immobilization)  [] Transferred from outside facility (Rehab or Long term care)  [] Age </= to 50

## 2023-11-27 NOTE — PROGRESS NOTE ADULT - SUBJECTIVE AND OBJECTIVE BOX
NSICU ATTENDING PROGRESS NOTE    Chief complaint:   Patient is a 83y old  Male who presents with a chief complaint of s/p unwitnessed fall with head strike (22 Nov 2023 00:58)    HPI:  82y M with PMH HTN, CAD s/p stents, renal cell carcinoma status post left nephrectomy, bladder CA, BPH, CHF, LBBB, vertigo,  HLD, 5.5cm AAA without rupture post EVAR, paroxysmal A-fib on Eliquis, Plavix, and aspirin, early stage Alzheimer dementia transferred from [facility] s/p unwitnessed fall with head strike at home found by wife on floor at 8am. Patient brought to urgent care by wife and had 5 staples to posterior scalp but was instructed to go to nearest ED.  CT head at [facility] showed R frontal IPH, SDH, SAH at 9:00. CTA stroke protocol not performed at [facility]. Patient BIB on 6L NC.  Pt GCS 15 on arrival, A&Ox2 on arrival. After initial assessment, patient noted to be vomiting enroute to CT scanner.    (10 Nov 2023 11:04)    Recent hospital course:  11/23 - on eliquis, downgraded to medicine    24hr EVENTS:  11/26 - lethargy -> CTH - inc SDH, eliquis held -> reupgraded; straight cath x2 -> hall placed; leakage from surgical wound -> 2 staples and dressed wound     ROS: Unable to fully assess due to mental status     -----------------------------------------------------------------------------------------------------------------------------------------------------------------------------------  NS @75    PHYSICAL EXAM:  General: Calmly sitting in chair with helmet  HEENT: MMM  Neuro:  -Mental status- No acute distress, AOx1-2, FC   -CN- PERRL 3mm, EOMI, tongue midline, face symmetric  RUE 5, RLE 4/5  LUE minimal w/d, LLE tr WD    CV: intermittent tachycardia  Pulm: diminished to auscultation  Abd: Soft, nontender, nondistended, LBM 11/25  Skin: warm, dry, pulses palpable, IASD on sacrum    Surgical site: no further drainage    -----------------------------------------------------------------------------------------------------  ICU Vital Signs Last 24 Hrs  T(C): 37.3 (27 Nov 2023 11:00), Max: 37.9 (26 Nov 2023 16:00)  T(F): 99.1 (27 Nov 2023 11:00), Max: 100.2 (26 Nov 2023 16:00)  HR: 91 (27 Nov 2023 11:00) (79 - 114)  BP: 115/68 (27 Nov 2023 11:00) (104/67 - 134/65)  BP(mean): 82 (27 Nov 2023 11:00) (22 - 88)=  RR: 19 (27 Nov 2023 11:00) (13 - 23)  SpO2: 100% (27 Nov 2023 11:00) (93% - 100%)    O2 Parameters below as of 27 Nov 2023 08:46  Patient On (Oxygen Delivery Method): room air        I&O's Summary    26 Nov 2023 07:01  -  27 Nov 2023 07:00  --------------------------------------------------------  IN: 1665 mL / OUT: 2350 mL / NET: -685 mL    27 Nov 2023 07:01  -  27 Nov 2023 11:57  --------------------------------------------------------  IN: 540 mL / OUT: 395 mL / NET: 145 mL        MEDICATIONS  (STANDING):  amantadine 100 milliGRAM(s) Oral <User Schedule>  atorvastatin 80 milliGRAM(s) Oral at bedtime  chlorhexidine 2% Cloths 1 Application(s) Topical <User Schedule>  diltiazem    Tablet 60 milliGRAM(s) Oral <User Schedule>  folic acid 1 milliGRAM(s) Oral daily  levalbuterol Inhalation 1.25 milliGRAM(s) Inhalation every 6 hours  melatonin 3 milliGRAM(s) Oral at bedtime  meropenem Injectable 1000 milliGRAM(s) IV Push every 12 hours  meropenem Injectable      metoprolol tartrate 50 milliGRAM(s) Oral <User Schedule>  Nephro-roma 1 Tablet(s) Oral daily  polyethylene glycol 3350 17 Gram(s) Oral daily  senna 2 Tablet(s) Oral at bedtime  sodium chloride 0.9%. 1000 milliLiter(s) (75 mL/Hr) IV Continuous <Continuous>  tamsulosin 0.8 milliGRAM(s) Oral at bedtime      IMAGING:   CTH - stable from prior    LAB RESULTS:                8.4    12.56 )-----------( 371      ( 27 Nov 2023 03:44 )             25.8       11-27    143  |  108  |  60.1<H>  ----------------------------<  116<H>  4.5   |  21.0<L>  |  2.07<H>    Ca    8.8      27 Nov 2023 03:44  Phos  4.9     11-27  Mg     2.7     11-27        -----------------------------------------------------------------------------------------------------------------------------------------------------------------------------------

## 2023-11-27 NOTE — PROGRESS NOTE ADULT - SUBJECTIVE AND OBJECTIVE BOX
HPI:  82y M with PMH HTN, CAD s/p stents, renal cell carcinoma status post left nephrectomy, bladder CA, BPH, CHF, LBBB, vertigo,  HLD, 5.5cm AAA without rupture post EVAR, paroxysmal A-fib on Eliquis, Plavix, and aspirin, early stage Alzheimer dementia transferred from [facility] s/p unwitnessed fall with head strike at home found by wife on floor at 8am. Patient brought to urgent care by wife and had 5 staples to posterior scalp but was instructed to go to nearest ED.  CT head at [facility] showed R frontal IPH, SDH, SAH at 9:00. CTA stroke protocol not performed at [facility]. Patient BIB on 6L NC.  Pt GCS 15 on arrival, A&Ox2 on arrival. After initial assessment, patient noted to be vomiting enroute to CT scanner.    (10 Nov 2023 11:04)    OVERNIGHT EVENTS: Patient examined at bedside, doing well, following commands. Repeat CTH done, stable. Holding ASA/Eliquis.       Vital Signs Last 24 Hrs  T(C): 37.4 (2023 01:00), Max: 37.9 (2023 16:00)  T(F): 99.3 (2023 01:00), Max: 100.2 (2023 16:00)  HR: 105 (:00) (97 - 125)  BP: 128/70 (:00) (93/63 - 150/88)  BP(mean): 85 (:) (22 - 102)  RR: 20 (:00) (13 - 23)  SpO2: 93% (:00) (93% - 98%)    Parameters below as of :00  Patient On (Oxygen Delivery Method): room air    I&O's Summary    2023 07:01  -  2023 07:00  --------------------------------------------------------  IN: 720 mL / OUT: 350 mL / NET: 370 mL    :01  -  2023 01:32  --------------------------------------------------------  IN: 1050 mL / OUT: 1600 mL / NET: -550 mL        PHYSICAL EXAM:  General: NAD, pt is comfortably sitting up in bed, on room air  Cardiovascular: Regular rate and rhythm  Respiratory: nonlabored breathing, normal chest rise  GI: abdomen soft, nontender, nondistended  Neuro: PERRL 3mm briskly reactive, opens eyes spontaneously   able to follow commands on R (thumbs up/wiggles toes)  RUE 4/5, RLE 4-/5 with drift, LUE/LLE WD  Wound/incision: R craniectomy incision c/d/i      LABS:                        9.5    17.42 )-----------( 442      ( 2023 08:50 )             28.9     11    142  |  104  |  55.9<H>  ----------------------------<  120<H>  4.6   |  20.0<L>  |  2.41<H>    Ca    9.1      2023 08:50  Phos  4.3       Mg     2.4       TPro  8.0  /  Alb  3.6  /  TBili  0.9  /  DBili  x   /  AST  28  /  ALT  16  /  AlkPhos  107  11-25  Urinalysis Basic - ( 2023 14:00 )  Color: Yellow / Appearance: Clear / S.019 / pH: x  Gluc: x / Ketone: Negative mg/dL  / Bili: Negative / Urobili: 1.0 mg/dL   Blood: x / Protein: 100 mg/dL / Nitrite: Negative   Leuk Esterase: Negative / RBC: 1 /HPF / WBC 1 /HPF   Sq Epi: x / Non Sq Epi: 1 /HPF / Bacteria: Negative /HPF    CAPILLARY BLOOD GLUCOSE  POCT Blood Glucose.: 132 mg/dL (2023 16:23)    Allergies  penicillin (Rash)  heparin (Other (Severe))  penicillin (Hives)  Cipro (Other)  Levaquin (Other)  heparin (Other)      MEDICATIONS:  meropenem Injectable 1000 milliGRAM(s) IV Push every 12 hours  meropenem Injectable      acetaminophen   Oral Liquid .. 800 milliGRAM(s) Oral every 6 hours PRN  amantadine 100 milliGRAM(s) Oral <User Schedule>  melatonin 3 milliGRAM(s) Oral at bedtime  atorvastatin 80 milliGRAM(s) Oral at bedtime  chlorhexidine 2% Cloths 1 Application(s) Topical <User Schedule>  diltiazem    Tablet 60 milliGRAM(s) Oral <User Schedule>  hydrALAZINE Injectable 10 milliGRAM(s) IV Push every 2 hours PRN  labetalol Injectable 10 milliGRAM(s) IV Push every 2 hours PRN  levalbuterol Inhalation 1.25 milliGRAM(s) Inhalation every 6 hours  metoprolol tartrate 50 milliGRAM(s) Oral <User Schedule>  polyethylene glycol 3350 17 Gram(s) Oral daily  senna 2 Tablet(s) Oral at bedtime  tamsulosin 0.8 milliGRAM(s) Oral at bedtime  folic acid 1 milliGRAM(s) Oral daily  Nephro-roma 1 Tablet(s) Oral daily  sodium chloride 0.9%. 1000 milliLiter(s) IV Continuous <Continuous>    CULTURES:  Culture Results:   No growth at 4 days ( @ 00:08)  Culture Results:   Normal Respiratory Isabell present ( @ 00:08)    RADIOLOGY & ADDITIONAL TESTS:  < from: CT Head No Cont (23 @ 21:59) >  IMPRESSION: Overall stable follow-up CT exam when compared with the most   recent prior CT study.      ASSESSMENT:  82 y/o male with PMHx of HTN, CAD s/p stents, RCC s/p L nephrectomy, bladder CA, BPH, CHF, LBBB, vertigo, HLD, 5.5cm AAA without rupture post EVAR, paroxysmal A-fib on Eliquis, Plavix, and aspirin, early stage Alzheimer dementia transferred from [facility] 11/10/23 s/p unwitnessed fall with head strike at home found by wife on floor at 8am. CT head at [facility] showed R frontal IPH, SDH, SAH at 9:00. s/p R craniectomy for ICH 11/10. Hospital course complicated by Klebsiella PNA, hypernatremia, lethargy, LUE DVT on Eliquis. Downgraded to stepdown on , re-upgraded to NSICU on  for new acute anterior falx SDH.     Plan:  -Q2hr neuro checks   -Pain control PRN, avoid oversedation   -STAT CTH for any decline in neuro exam   -Helmet to be worn when OOB   -Cranioplasty planning, flap remains soft   -Completed course of keppra for seizure ppx   -Puree diet with moderately thick liquids   -ID following, appreciate recommendations   -holding ASA/Eliquis   -Further medical management/supportive care per primary team   -final plan pend discussion with neurosurgeon in AM rounds

## 2023-11-28 LAB
ANION GAP SERPL CALC-SCNC: 11 MMOL/L — SIGNIFICANT CHANGE UP (ref 5–17)
ANION GAP SERPL CALC-SCNC: 11 MMOL/L — SIGNIFICANT CHANGE UP (ref 5–17)
BUN SERPL-MCNC: 51.1 MG/DL — HIGH (ref 8–20)
BUN SERPL-MCNC: 51.1 MG/DL — HIGH (ref 8–20)
CALCIUM SERPL-MCNC: 8.5 MG/DL — SIGNIFICANT CHANGE UP (ref 8.4–10.5)
CALCIUM SERPL-MCNC: 8.5 MG/DL — SIGNIFICANT CHANGE UP (ref 8.4–10.5)
CHLORIDE SERPL-SCNC: 110 MMOL/L — HIGH (ref 96–108)
CHLORIDE SERPL-SCNC: 110 MMOL/L — HIGH (ref 96–108)
CO2 SERPL-SCNC: 20 MMOL/L — LOW (ref 22–29)
CO2 SERPL-SCNC: 20 MMOL/L — LOW (ref 22–29)
CREAT SERPL-MCNC: 1.62 MG/DL — HIGH (ref 0.5–1.3)
CREAT SERPL-MCNC: 1.62 MG/DL — HIGH (ref 0.5–1.3)
EGFR: 42 ML/MIN/1.73M2 — LOW
EGFR: 42 ML/MIN/1.73M2 — LOW
GLUCOSE SERPL-MCNC: 108 MG/DL — HIGH (ref 70–99)
GLUCOSE SERPL-MCNC: 108 MG/DL — HIGH (ref 70–99)
HCT VFR BLD CALC: 24.5 % — LOW (ref 39–50)
HCT VFR BLD CALC: 24.5 % — LOW (ref 39–50)
HGB BLD-MCNC: 7.8 G/DL — LOW (ref 13–17)
HGB BLD-MCNC: 7.8 G/DL — LOW (ref 13–17)
MAGNESIUM SERPL-MCNC: 2.2 MG/DL — SIGNIFICANT CHANGE UP (ref 1.6–2.6)
MAGNESIUM SERPL-MCNC: 2.2 MG/DL — SIGNIFICANT CHANGE UP (ref 1.6–2.6)
MCHC RBC-ENTMCNC: 31 PG — SIGNIFICANT CHANGE UP (ref 27–34)
MCHC RBC-ENTMCNC: 31 PG — SIGNIFICANT CHANGE UP (ref 27–34)
MCHC RBC-ENTMCNC: 31.8 GM/DL — LOW (ref 32–36)
MCHC RBC-ENTMCNC: 31.8 GM/DL — LOW (ref 32–36)
MCV RBC AUTO: 97.2 FL — SIGNIFICANT CHANGE UP (ref 80–100)
MCV RBC AUTO: 97.2 FL — SIGNIFICANT CHANGE UP (ref 80–100)
PHOSPHATE SERPL-MCNC: 3.5 MG/DL — SIGNIFICANT CHANGE UP (ref 2.4–4.7)
PHOSPHATE SERPL-MCNC: 3.5 MG/DL — SIGNIFICANT CHANGE UP (ref 2.4–4.7)
PLATELET # BLD AUTO: 283 K/UL — SIGNIFICANT CHANGE UP (ref 150–400)
PLATELET # BLD AUTO: 283 K/UL — SIGNIFICANT CHANGE UP (ref 150–400)
POTASSIUM SERPL-MCNC: 4 MMOL/L — SIGNIFICANT CHANGE UP (ref 3.5–5.3)
POTASSIUM SERPL-MCNC: 4 MMOL/L — SIGNIFICANT CHANGE UP (ref 3.5–5.3)
POTASSIUM SERPL-SCNC: 4 MMOL/L — SIGNIFICANT CHANGE UP (ref 3.5–5.3)
POTASSIUM SERPL-SCNC: 4 MMOL/L — SIGNIFICANT CHANGE UP (ref 3.5–5.3)
RBC # BLD: 2.52 M/UL — LOW (ref 4.2–5.8)
RBC # BLD: 2.52 M/UL — LOW (ref 4.2–5.8)
RBC # FLD: 13.3 % — SIGNIFICANT CHANGE UP (ref 10.3–14.5)
RBC # FLD: 13.3 % — SIGNIFICANT CHANGE UP (ref 10.3–14.5)
SODIUM SERPL-SCNC: 141 MMOL/L — SIGNIFICANT CHANGE UP (ref 135–145)
SODIUM SERPL-SCNC: 141 MMOL/L — SIGNIFICANT CHANGE UP (ref 135–145)
WBC # BLD: 8.94 K/UL — SIGNIFICANT CHANGE UP (ref 3.8–10.5)
WBC # BLD: 8.94 K/UL — SIGNIFICANT CHANGE UP (ref 3.8–10.5)
WBC # FLD AUTO: 8.94 K/UL — SIGNIFICANT CHANGE UP (ref 3.8–10.5)
WBC # FLD AUTO: 8.94 K/UL — SIGNIFICANT CHANGE UP (ref 3.8–10.5)

## 2023-11-28 PROCEDURE — 93971 EXTREMITY STUDY: CPT | Mod: 26,LT

## 2023-11-28 PROCEDURE — 99233 SBSQ HOSP IP/OBS HIGH 50: CPT

## 2023-11-28 PROCEDURE — 70450 CT HEAD/BRAIN W/O DYE: CPT | Mod: 26

## 2023-11-28 PROCEDURE — 99232 SBSQ HOSP IP/OBS MODERATE 35: CPT

## 2023-11-28 RX ORDER — MEMANTINE HYDROCHLORIDE 10 MG/1
5 TABLET ORAL
Refills: 0 | Status: DISCONTINUED | OUTPATIENT
Start: 2023-11-28 | End: 2023-12-05

## 2023-11-28 RX ORDER — FONDAPARINUX SODIUM 2.5 MG/.5ML
1.2 INJECTION, SOLUTION SUBCUTANEOUS
Refills: 0 | Status: DISCONTINUED | OUTPATIENT
Start: 2023-11-28 | End: 2023-12-01

## 2023-11-28 RX ADMIN — LEVALBUTEROL 1.25 MILLIGRAM(S): 1.25 SOLUTION, CONCENTRATE RESPIRATORY (INHALATION) at 05:44

## 2023-11-28 RX ADMIN — Medication 1 TABLET(S): at 11:29

## 2023-11-28 RX ADMIN — CHLORHEXIDINE GLUCONATE 1 APPLICATION(S): 213 SOLUTION TOPICAL at 05:10

## 2023-11-28 RX ADMIN — MEROPENEM 1000 MILLIGRAM(S): 1 INJECTION INTRAVENOUS at 17:55

## 2023-11-28 RX ADMIN — Medication 1 MILLIGRAM(S): at 11:29

## 2023-11-28 RX ADMIN — MEROPENEM 1000 MILLIGRAM(S): 1 INJECTION INTRAVENOUS at 05:09

## 2023-11-28 RX ADMIN — ATORVASTATIN CALCIUM 80 MILLIGRAM(S): 80 TABLET, FILM COATED ORAL at 22:36

## 2023-11-28 RX ADMIN — FONDAPARINUX SODIUM 1.2 MILLIGRAM(S): 2.5 INJECTION, SOLUTION SUBCUTANEOUS at 18:05

## 2023-11-28 RX ADMIN — MEMANTINE HYDROCHLORIDE 5 MILLIGRAM(S): 10 TABLET ORAL at 05:12

## 2023-11-28 RX ADMIN — MEMANTINE HYDROCHLORIDE 5 MILLIGRAM(S): 10 TABLET ORAL at 17:55

## 2023-11-28 RX ADMIN — LEVALBUTEROL 1.25 MILLIGRAM(S): 1.25 SOLUTION, CONCENTRATE RESPIRATORY (INHALATION) at 10:40

## 2023-11-28 RX ADMIN — Medication 60 MILLIGRAM(S): at 05:11

## 2023-11-28 RX ADMIN — SODIUM CHLORIDE 75 MILLILITER(S): 9 INJECTION INTRAMUSCULAR; INTRAVENOUS; SUBCUTANEOUS at 02:15

## 2023-11-28 RX ADMIN — Medication 100 MILLIGRAM(S): at 05:10

## 2023-11-28 RX ADMIN — POLYETHYLENE GLYCOL 3350 17 GRAM(S): 17 POWDER, FOR SOLUTION ORAL at 11:30

## 2023-11-28 RX ADMIN — TAMSULOSIN HYDROCHLORIDE 0.8 MILLIGRAM(S): 0.4 CAPSULE ORAL at 22:36

## 2023-11-28 RX ADMIN — Medication 50 MILLIGRAM(S): at 11:29

## 2023-11-28 RX ADMIN — Medication 6 MILLIGRAM(S): at 22:35

## 2023-11-28 RX ADMIN — SODIUM CHLORIDE 75 MILLILITER(S): 9 INJECTION INTRAMUSCULAR; INTRAVENOUS; SUBCUTANEOUS at 12:49

## 2023-11-28 RX ADMIN — Medication 60 MILLIGRAM(S): at 17:53

## 2023-11-28 RX ADMIN — Medication 50 MILLIGRAM(S): at 22:35

## 2023-11-28 NOTE — PHYSICAL THERAPY INITIAL EVALUATION ADULT - PERTINENT HX OF CURRENT PROBLEM, REHAB EVAL
82y M with PMH HTN, CAD s/p stents, renal cell carcinoma status post left nephrectomy, bladder CA, BPH, CHF, LBBB, vertigo,  HLD, 5.5cm AAA without rupture post EVAR, paroxysmal A-fib on Eliquis, Plavix, and aspirin, early stage Alzheimer dementia transferred from [facility] s/p unwitnessed fall with head strike at home found by wife on floor at 8am. Patient brought to urgent care by wife and had 5 staples to posterior scalp but was instructed to go to nearest ED.  CT head at [place] showed R frontal IPH, SDH, SAH at 9:00. Pt is S/p Right craniectomy for ICH 11/10. Hospital course complicated by Klebsiella PNA, hypernatremia, lethargy, LUE DVT on Eliquis. Downgraded to stepdown on 11/23, re-upgraded to NSICU on 11/26 2*2 change in status and new acute anterior falx SDH seen on imaging

## 2023-11-28 NOTE — PROGRESS NOTE ADULT - ASSESSMENT
83M with HTN, CAD s/p PCO, RCC s/p left nephrectomy, bladder Ca, BPH, CHF, Vertigo, HLD, AAA s/p EVAR, parox Afib, h/o HIT, Alzheimers transferred s/p fall with Rt frontal [facility]. Initially had hemicraniectomy,  with post op course complicated by RVR, asp PNA, hypoxic resp failure, LUE DVT and questionable increase in AAA size. Started on AC with waxing/waning mental status prompting head imaging with evidcen of new bleed. Monitored in NICU and now being transferred back to Medicine     [facility]   s/p Rt hemicraniotomy   started on Ac for DVT/Afib with demnostartion of new bleed  Continue Q2 neurochecks in step down  Close monitoring of mental status, per NICU expectation of waxing/waning mental status,  STAT CT head if mental status acutely changes   Strict BP parameters (maintain SBP < 160, avoid rapid fluctuations)  Continue Amantadine   Continue modified diet     parox Afib/ HTN/HLD   Continue Metoprolol 50 mg bid  Continue Statin   Labetalol / Hydralazine PRN   Continue Fondaparinux. Transition to DOAC when cleared by NSx     Questionable veg on TTE   Was being considered for WHIT per chart  f/u with Cardiology in am to determine if plan for WHIT     Acute DVT   Per report appears provoked  Continue AC as above, avoid heparin products 2/2 h/o HIT  DOAC when cleared by NSx    Overall guarded prognosis    83M with HTN, CAD s/p PCO, RCC s/p left nephrectomy, bladder Ca, BPH, CHF, Vertigo, HLD, AAA s/p EVAR, parox Afib, h/o HIT, Alzheimers transferred s/p fall with Rt frontal [facility]. Initially had hemicraniectomy,  with post op course complicated by RVR, asp PNA, hypoxic resp failure, LUE DVT and questionable increase in AAA size. Started on AC with waxing/waning mental status prompting head imaging with evidcen of new bleed. Monitored in NICU and now being transferred back to Medicine     [facility]   s/p Rt hemicraniotomy   started on Ac for DVT/Afib with demonstration of new bleed  Continue Q2 neurochecks in step down  Close monitoring of mental status, per NICU expectation of waxing/waning mental status,  STAT CT head if mental status acutely changes   Strict BP parameters (maintain SBP < 160, avoid rapid fluctuations)  Continue Amantadine   Continue modified diet     parox Afib/ HTN/HLD   Continue Metoprolol 50 mg bid  Continue Statin   Labetalol / Hydralazine PRN   Continue Fondaparinux. Transition to DOAC when cleared by NSx     Questionable veg on TTE   Was being considered for WHIT per chart  f/u with Cardiology in am to determine if plan for WHIT     Acute DVT   Per report appears provoked  Continue AC as above, avoid heparin products 2/2 h/o HIT  DOAC when cleared by NSx    Normocytic Anemia   Stools visualized. Normal color, no melena / evidence of GI blood loss  Repeat in am along with T&S,  transfuse to goal > 8 given h/o CAD    AAA s/p EVAR   Imaging reviewed by Vascular surgery  No intervention planned at present  Recommended obtaining prior imaging from pts Vascular surgeon (Dr Heredia for comparison)    ? DONNIE  Initial Cr wnl however has maintained a Cr of ~1.5-1.6  Suspect may have CKD II  Monitor and renally dose all medications               Overall guarded prognosis    83M with HTN, CAD s/p PCO, RCC s/p left nephrectomy, bladder Ca, BPH, CHF, Vertigo, HLD, AAA s/p EVAR, parox Afib, h/o HIT, Alzheimers transferred s/p fall with Rt frontal [facility]. Initially had hemicraniectomy,  with post op course complicated by RVR, asp PNA, hypoxic resp failure, LUE DVT and questionable increase in AAA size. Started on AC with waxing/waning mental status prompting head imaging with evidcen of new bleed. Monitored in NICU and now being transferred back to Medicine     [facility]   s/p Rt hemicraniotomy   started on Ac for DVT/Afib with demonstration of new bleed  Continue Q2 neurochecks in step down  Close monitoring of mental status, per NICU expectation of waxing/waning mental status,  STAT CT head if mental status acutely changes   Strict BP parameters (maintain SBP < 160, avoid rapid fluctuations)  Continue Amantadine   Continue modified diet     parox Afib/ HTN/HLD   Continue Metoprolol 50 mg bid  Continue Statin   Labetalol / Hydralazine PRN   Continue Fondaparinux. Transition to DOAC when cleared by NSx     Questionable veg on TTE   Was being considered for WHIT per chart  f/u with Cardiology in am to determine if plan for WHIT     Acute DVT   Per report appears provoked  Continue AC as above, avoid heparin products 2/2 h/o HIT  DOAC when cleared by NSx    Normocytic Anemia   Stools visualized. Normal color, no melena / evidence of GI blood loss  Repeat in am along with T&S,  transfuse to goal > 8 given h/o CAD    AAA s/p EVAR   Imaging reviewed by Vascular surgery  No intervention planned at present  Recommended obtaining prior imaging from pts Vascular surgeon (Dr Heredia for comparison)    ? DONNIE  Initial Cr wnl however has maintained a Cr of ~1.5-1.6  Suspect may have CKD II  Monitor and renally dose all medications     Acute hypoxic resp failure 2/2 suspected asp PNA   Resolved   s/p completion of IV Abx   ID recs reviewed          Overall guarded prognosis

## 2023-11-28 NOTE — PHYSICAL THERAPY INITIAL EVALUATION ADULT - IMPAIRED TRANSFERS: BED/CHAIR, REHAB EVAL
Poor c-spine extensor strength, Pop over transfer bed to chair only./impaired balance/impaired postural control/decreased strength

## 2023-11-28 NOTE — PROGRESS NOTE ADULT - SUBJECTIVE AND OBJECTIVE BOX
HPI:  82y M with PMH HTN, CAD s/p stents, renal cell carcinoma status post left nephrectomy, bladder CA, BPH, CHF, LBBB, vertigo,  HLD, 5.5cm AAA without rupture post EVAR, paroxysmal A-fib on Eliquis, Plavix, and aspirin, early stage Alzheimer dementia transferred from [facility] s/p unwitnessed fall with head strike at home found by wife on floor at 8am. Patient brought to urgent care by wife and had 5 staples to posterior scalp but was instructed to go to nearest ED.  CT head at [place] showed R frontal IPH, SDH, SAH at 9:00. CTA stroke protocol not performed at [facility]. Patient BIB on 6L NC.  Pt GCS 15 on arrival, A&Ox2 on arrival. After initial assessment, patient noted to be vomiting enroute to CT scanner.          (10 Nov 2023 11:04)      INTERVAL HPI/OVERNIGHT EVENTS:  Patient examined at bedside, doing well, following commands. Repeat CTH done, stable. Holding ASA/Eliquis.       Vital Signs Last 24 Hrs  T(C): 37.9 (27 Nov 2023 22:00), Max: 38 (27 Nov 2023 20:50)  T(F): 100.2 (27 Nov 2023 22:00), Max: 100.4 (27 Nov 2023 20:50)  HR: 90 (27 Nov 2023 22:00) (74 - 105)  BP: 135/67 (27 Nov 2023 22:00) (107/73 - 155/56)  BP(mean): 86 (27 Nov 2023 22:00) (68 - 96)  RR: 24 (27 Nov 2023 22:00) (13 - 27)  SpO2: 95% (27 Nov 2023 22:00) (93% - 100%)    Parameters below as of 27 Nov 2023 22:00  Patient On (Oxygen Delivery Method): room air        PHYSICAL EXAM:  General: NAD, pt is comfortably sitting up in bed, on room air  Cardiovascular: Regular rate and rhythm  Respiratory: nonlabored breathing, normal chest rise  GI: abdomen soft, nontender, nondistended  Neuro: PERRL 3mm briskly reactive, opens eyes spontaneously   able to follow commands on R (thumbs up/wiggles toes)  RUE 4/5, RLE 4-/5 with drift, LUE/LLE WD  Wound/incision: R craniectomy incision c/d/i. Staples in place. Flap soft and slightly sunken     LABS:                        8.4    12.56 )-----------( 371      ( 27 Nov 2023 03:44 )             25.8     11-27    141  |  107  |  59.1<H>  ----------------------------<  118<H>  4.5   |  22.0  |  1.80<H>    Ca    9.0      27 Nov 2023 14:47  Phos  4.9     11-27  Mg     2.7     11-27        Urinalysis Basic - ( 27 Nov 2023 14:47 )    Color: x / Appearance: x / SG: x / pH: x  Gluc: 118 mg/dL / Ketone: x  / Bili: x / Urobili: x   Blood: x / Protein: x / Nitrite: x   Leuk Esterase: x / RBC: x / WBC x   Sq Epi: x / Non Sq Epi: x / Bacteria: x        11-26 @ 07:01 - 11-27 @ 07:00  --------------------------------------------------------  IN: 1665 mL / OUT: 2350 mL / NET: -685 mL    11-27 @ 07:01 - 11-28 @ 00:46  --------------------------------------------------------  IN: 1800 mL / OUT: 1515 mL / NET: 285 mL        RADIOLOGY & ADDITIONAL TESTS:  [facility] 11/27/23:    IMPRESSION:    Grossly stable CT examination of the head when compared prior imaging.

## 2023-11-28 NOTE — PROGRESS NOTE ADULT - SUBJECTIVE AND OBJECTIVE BOX
[facility] Division of Hospital Medicine    Chief Complaint:  CVA    SUBJECTIVE / OVERNIGHT EVENTS:  Pt exmained lying in bed in NICU   No actuive complaints   ROS limited by pts mental status but denies any complaints     HPI  83 M with HTN, CAD s/p PCO, RCC s/p left nephrectomy, bladder Ca, BPH, CHF, Vertigo, HLD, AAA s/p EVAR, parox Afib, Alzheimers transferred initially from [place] 11/10 after fall resulting in R frontal IPH. Underwent Rt Hemicraniectomy 11/10. Course complicated by RVR and asp PNA with worsening acute hypoxic resp failure. Subsequently found to have LUE DVT and started on DOAC. CT imaging concerning for increaasing size of AAA. Evaluated by Vascular surgery, no intervention recommended at the time. Was transferred to stepdown where reportedly had waxing / waning of mental status for which a stat CT head was done. Imaging notable for new Falx bleed and transferred back to NICU. With stable serial imaging now being transferred back to medicine         MEDICATIONS  (STANDING):  amantadine 100 milliGRAM(s) Oral <User Schedule>  atorvastatin 80 milliGRAM(s) Oral at bedtime  chlorhexidine 2% Cloths 1 Application(s) Topical <User Schedule>  diltiazem    Tablet 60 milliGRAM(s) Oral <User Schedule>  folic acid 1 milliGRAM(s) Oral daily  fondaparinux Injectable 1.2 milliGRAM(s) SubCutaneous <User Schedule>  melatonin 6 milliGRAM(s) Oral at bedtime  memantine 5 milliGRAM(s) Oral two times a day  metoprolol tartrate 50 milliGRAM(s) Oral <User Schedule>  Nephro-roma 1 Tablet(s) Oral daily  polyethylene glycol 3350 17 Gram(s) Oral daily  senna 2 Tablet(s) Oral at bedtime  sodium chloride 0.9%. 1000 milliLiter(s) (75 mL/Hr) IV Continuous <Continuous>  tamsulosin 0.8 milliGRAM(s) Oral at bedtime    MEDICATIONS  (PRN):  acetaminophen     Tablet .. 650 milliGRAM(s) Oral every 6 hours PRN Temp greater or equal to 38C (100.4F), Mild Pain (1 - 3)  hydrALAZINE Injectable 10 milliGRAM(s) IV Push every 2 hours PRN SBP>160  labetalol Injectable 10 milliGRAM(s) IV Push every 2 hours PRN SBP>160        I&O's Summary    27 Nov 2023 07:01  -  28 Nov 2023 07:00  --------------------------------------------------------  IN: 2400 mL / OUT: 2050 mL / NET: 350 mL    28 Nov 2023 07:01  -  28 Nov 2023 20:23  --------------------------------------------------------  IN: 1620 mL / OUT: 1265 mL / NET: 355 mL        PHYSICAL EXAM:  Vital Signs Last 24 Hrs  T(C): 37.5 (28 Nov 2023 17:00), Max: 38 (27 Nov 2023 20:50)  T(F): 99.5 (28 Nov 2023 17:00), Max: 100.4 (27 Nov 2023 20:50)  HR: 86 (28 Nov 2023 20:00) (59 - 110)  BP: 144/70 (28 Nov 2023 20:00) (106/60 - 155/76)  BP(mean): 91 (28 Nov 2023 20:00) (72 - 101)  RR: 21 (28 Nov 2023 20:00) (13 - 26)  SpO2: 100% (28 Nov 2023 20:00) (95% - 100%)    Parameters below as of 28 Nov 2023 10:42  Patient On (Oxygen Delivery Method): room air            CONSTITUTIONAL: Non toxic appearing elderly male with helmet on  ENMT: Moist oral mucosa, no JVD   RESPIRATORY: Normal respiratory effort; lungs are clear to auscultation bilaterally  CARDIOVASCULAR: Regular rate and rhythm, normal S1 and S2,  No lower extremity edema  ABDOMEN: Nontender to palpation, normoactive bowel sounds, no rebound/guarding; No hepatosplenomegaly  MUSCLOSKELETAL:  no clubbing or cyanosis of digits; no joint swelling or tenderness to palpation, soft restraint on Rt hand   PSYCH: Awake and alert to person   NEUROLOGY: Limited by pts participation , moves both upper ext spon and LE spont. No overt CN deficits appreciated   SKIN: No rashes; no palpable lesions, questionable sacral Stage 1     LABS:                        7.8    8.94  )-----------( 283      ( 28 Nov 2023 03:48 )             24.5     11-28    141  |  110<H>  |  51.1<H>  ----------------------------<  108<H>  4.0   |  20.0<L>  |  1.62<H>    Ca    8.5      28 Nov 2023 03:48  Phos  3.5     11-28  Mg     2.2     11-28            Urinalysis Basic - ( 28 Nov 2023 03:48 )    Color: x / Appearance: x / SG: x / pH: x  Gluc: 108 mg/dL / Ketone: x  / Bili: x / Urobili: x   Blood: x / Protein: x / Nitrite: x   Leuk Esterase: x / RBC: x / WBC x   Sq Epi: x / Non Sq Epi: x / Bacteria: x        CAPILLARY BLOOD GLUCOSE            RADIOLOGY & ADDITIONAL TESTS:      11/27/23 CT Head  FINDINGS:  Redemonstrated hemorrhagic focus along the anterior falx, minimally increased in size when compared prior imaging. Surrounding edematous change.  Redemonstrated territory of infarction in the right MCA distribution. Scattered areas of increased attenuation along the cortex of the affected territory, likely a combination of residual hemorrhage, petechial hemorrhages, and laminar necrosis. Overall findings grossly stable   compared to prior imaging. Areas of decreased attenuation scattered throughout the deep and periventricular white matter, compatible with chronic small vessel disease. Mild central parenchymal volume loss. Ventricular size grossly stable without she evidence of hydrocephalus.  Grossly stable appearing hypodense collection overlying the right cerebral convexity. No significant mass effect or midline shift. Postoperative changes related to a right calvarial craniectomy.  The cranial cervical junction is within normal limits. The sella is not expanded. No depressed calvarial fracture. The visualized paranasal sinuses are well aerated. The visualized mastoid air cells are well aerated. The visualized orbits are within normal limits.    IMPRESSION:  Grossly stable CT examination of the head when compared prior imaging.    TTE   Summary:   1. Endocardial visualization was enhanced with intravenous echo contrast.   2. Normal global left ventricular systolic function.   3. There is mild concentric left ventricular hypertrophy.   4. Left ventricular ejection fraction, by visual estimation, is 60 to 65%.   5. Spectral Doppler shows impaired relaxation pattern of left ventricular myocardial filling (Grade I diastolic dysfunction).   6. Elevated mean left atrial pressure.   7. Mildly enlarged right ventricle.   8. Normal left atrial size.   9. Normal right atrial size.  10. Moderate paradoxial low gradient aortic stenosis.  11. The Dimesionless Index value is .39.  12. Moderately calcified aortic valve with mobile echodensity poorly visualized. May represent artifact vs mobile calcification vs vegetation. Consider WHIT if clinically indicated.  13. Mild aortic regurgitation.  14. Moderate thickening and calcification of the anterior and posterior   mitral valve leaflets.  15. Mild tricuspid regurgitation.

## 2023-11-28 NOTE — CHART NOTE - NSCHARTNOTEFT_GEN_A_CORE
HPI:  82y M with PMH HTN, CAD s/p stents, renal cell carcinoma status post left nephrectomy, bladder CA, BPH, CHF, LBBB, vertigo,  HLD, 5.5cm AAA without rupture post EVAR, paroxysmal A-fib on Eliquis, Plavix, and aspirin, early stage Alzheimer dementia transferred from [facility] s/p unwitnessed fall with head strike at home found by wife on floor at 8am. Patient brought to urgent care by wife and had 5 staples to posterior scalp but was instructed to go to nearest ED.  CT head at [place] showed R frontal IPH, SDH, SAH at 9:00. CTA stroke protocol not performed at [facility]. Patient BIB on 6L NC.  Pt GCS 15 on arrival, A&Ox2 on arrival. After initial assessment, patient noted to be vomiting enroute to CT scanner.       INTERVAL HPI/OVERNIGHT EVENTS:    11/10: R hemicraniectomy   11/11: IZABEL removed.   11/13: Afib with RVR< given lopressor and cardizem. extubated, tolerating well.  11/14: Respiratory/airway concerns started on cefepime for asp PNA.   11/15: started on aspirin. placed on high flow for inc. work of breathing   11/18: hypernatremia: started D5w. EEG negative, d/c EEG.   11.19: switched cefepime to ceftriaxone, d/c D5w, started on FW flushes for hypernatremia.   11/21:  New DVT LUE brachail veins, RUE superficial thrombosis cephalic vein. transitioned to Meropenem (ID FOLLOWING)   11/22: eliquis started for DVT.   Non contrast CT exhibiting infrarenal endograph, with reported increase in sac size compared to prior studies  Vascular surgery eval requested due to concerns for possible endoleak   11/23: CTH completed, preliminary stable. Downgraded to stepdown under neurosurgery. Passed for puree diet with moderately thick liquid with [id number] administration.   11/24: No acute Events  11/25: No acute Events  11/26: overnight, AMS, CTH showed new falx heme, re-upgraded to ICU, Eliquis held.  11/27: Repeat CTH stable  11/28: No acute events, exam stable (Waxing/Waning)    ICU Vital Signs Last 24 Hrs  T(C): 37.3 (28 Nov 2023 12:00), Max: 38 (27 Nov 2023 20:50)  T(F): 99.1 (28 Nov 2023 12:00), Max: 100.4 (27 Nov 2023 20:50)  HR: 110 (28 Nov 2023 12:00) (59 - 110)  BP: 131/77 (28 Nov 2023 12:00) (106/60 - 155/76)  BP(mean): 91 (28 Nov 2023 12:00) (68 - 101)  ABP: --  ABP(mean): --  RR: 16 (28 Nov 2023 12:00) (13 - 27)  SpO2: 98% (28 Nov 2023 12:00) (95% - 100%)    O2 Parameters below as of 28 Nov 2023 10:42  Patient On (Oxygen Delivery Method): room air      PHYSICAL EXAM:  GENERAL: NAD, well-groomed, well-developed  HEAD:  Atraumatic, normocephalic, Flap soft.   WOUND: Dressing clean dry intact  MENTAL STATUS: AAO x2 to name and place (gives thumbs up); Awake ; Opens eyes spontaneously; following simple commands On R side.   CRANIAL NERVES: PERRL; EOMI; corneals intact b/l;  blinks to threat b/l; no facial asymmetry; facial sensation grossly intact to light touch b/l;  tongue midline; palate rises symmetrically; gag reflex intact  MOTOR: RUE 5/5, RLE 4/5. LUE 1/5, LLE 1/5.  CHEST/LUNG: Normal chest rise and expansion   HEART: +S1/+S2  SKIN: Warm, dry; no rashes or lesions                            7.8    8.94  )-----------( 283      ( 28 Nov 2023 03:48 )             24.5   11-28    141  |  110<H>  |  51.1<H>  ----------------------------<  108<H>  4.0   |  20.0<L>  |  1.62<H>    Ca    8.5      28 Nov 2023 03:48  Phos  3.5     11-28  Mg     2.2     11-28    Urinalysis Basic - ( 28 Nov 2023 03:48 )    Color: x / Appearance: x / SG: x / pH: x  Gluc: 108 mg/dL / Ketone: x  / Bili: x / Urobili: x   Blood: x / Protein: x / Nitrite: x   Leuk Esterase: x / RBC: x / WBC x   Sq Epi: x / Non Sq Epi: x / Bacteria: x    I&O's Summary    27 Nov 2023 07:01  -  28 Nov 2023 07:00  --------------------------------------------------------  IN: 2400 mL / OUT: 2050 mL / NET: 350 mL    28 Nov 2023 07:01  -  28 Nov 2023 12:29  --------------------------------------------------------  IN: 855 mL / OUT: 585 mL / NET: 270 mL      Assessment	  82y M with TBI - R frontal IPH, SDH, tSAH; s/p R decompressive hemicraniectomy 11/10.  Encephalopathy due to TBI, resp. infection, resp. distress, delirium.  Respiratory distress due to impaired bulbar function with difficulty managing secretions, aspiration PNA.  PMH of CAD s/p stents, CHF; HTN, HLD. HIT, AAA post EVAR. Parox. A.Fib, on Cardizem.   PMH of renal cell carcinoma status post left nephrectomy, bladder CA, BPH  Nonoliguric DONNIE, likely ROXANNE, critical illness; improving.  AV vegetation on TTE - unclear etiology. Several BCx negative.     Plan:  -Q2hr neuro checks   -Pain control PRN, avoid oversedation   -STAT CTH for any decline in neuro exam   -Helmet to be worn when OOB   -Cranioplasty planning, flap remains soft   -Completed course of keppra for seizure ppx   -Puree diet with moderately thick liquids   -ID following, appreciate recommendations   -holding ASA/Eliquis for now, will discuss reinstating once cranioplasty plans are set.  -Fondaparinux started 11/28 for dvt ppx  -Further medical management/supportive care per primary team    Dispo- Downgrade to SDU under Medicine. Patient accepted by  ____ HPI:  82y M with PMH HTN, CAD s/p stents, renal cell carcinoma status post left nephrectomy, bladder CA, BPH, CHF, LBBB, vertigo,  HLD, 5.5cm AAA without rupture post EVAR, paroxysmal A-fib on Eliquis, Plavix, and aspirin, early stage Alzheimer dementia transferred from [facility] s/p unwitnessed fall with head strike at home found by wife on floor at 8am. Patient brought to urgent care by wife and had 5 staples to posterior scalp but was instructed to go to nearest ED.  CT head at [place] showed R frontal IPH, SDH, SAH at 9:00. CTA stroke protocol not performed at [facility]. Patient BIB on 6L NC.  Pt GCS 15 on arrival, A&Ox2 on arrival. After initial assessment, patient noted to be vomiting enroute to CT scanner.       INTERVAL HPI/OVERNIGHT EVENTS:    11/10: R hemicraniectomy   11/11: IZABEL removed.   11/13: Afib with RVR< given lopressor and cardizem. extubated, tolerating well.  11/14: Respiratory/airway concerns started on cefepime for asp PNA.   11/15: started on aspirin. placed on high flow for inc. work of breathing   11/18: hypernatremia: started D5w. EEG negative, d/c EEG.   11.19: switched cefepime to ceftriaxone, d/c D5w, started on FW flushes for hypernatremia.   11/21:  New DVT LUE brachail veins, RUE superficial thrombosis cephalic vein. transitioned to Meropenem (ID FOLLOWING)   11/22: eliquis started for DVT.   Non contrast CT exhibiting infrarenal endograph, with reported increase in sac size compared to prior studies  Vascular surgery eval requested due to concerns for possible endoleak   11/23: CTH completed, preliminary stable. Downgraded to stepdown under neurosurgery. Passed for puree diet with moderately thick liquid with [id number] administration.   11/24: No acute Events  11/25: No acute Events  11/26: overnight, AMS, CTH showed new falx heme, re-upgraded to ICU, Eliquis held.  11/27: Repeat CTH stable  11/28: No acute events, exam stable (Waxing/Waning)    ICU Vital Signs Last 24 Hrs  T(C): 37.3 (28 Nov 2023 12:00), Max: 38 (27 Nov 2023 20:50)  T(F): 99.1 (28 Nov 2023 12:00), Max: 100.4 (27 Nov 2023 20:50)  HR: 110 (28 Nov 2023 12:00) (59 - 110)  BP: 131/77 (28 Nov 2023 12:00) (106/60 - 155/76)  BP(mean): 91 (28 Nov 2023 12:00) (68 - 101)  ABP: --  ABP(mean): --  RR: 16 (28 Nov 2023 12:00) (13 - 27)  SpO2: 98% (28 Nov 2023 12:00) (95% - 100%)    O2 Parameters below as of 28 Nov 2023 10:42  Patient On (Oxygen Delivery Method): room air      PHYSICAL EXAM:  GENERAL: NAD, well-groomed, well-developed  HEAD:  Atraumatic, normocephalic, Flap soft.   WOUND: Dressing clean dry intact  MENTAL STATUS: AAO x2 to name and place (gives thumbs up); Awake ; Opens eyes spontaneously; following simple commands On R side.   CRANIAL NERVES: PERRL; EOMI; corneals intact b/l;  blinks to threat b/l; no facial asymmetry; facial sensation grossly intact to light touch b/l;  tongue midline; palate rises symmetrically; gag reflex intact  MOTOR: RUE 5/5, RLE 4/5. LUE 1/5, LLE 1/5.  CHEST/LUNG: Normal chest rise and expansion   HEART: +S1/+S2  SKIN: Warm, dry; no rashes or lesions                            7.8    8.94  )-----------( 283      ( 28 Nov 2023 03:48 )             24.5   11-28    141  |  110<H>  |  51.1<H>  ----------------------------<  108<H>  4.0   |  20.0<L>  |  1.62<H>    Ca    8.5      28 Nov 2023 03:48  Phos  3.5     11-28  Mg     2.2     11-28    Urinalysis Basic - ( 28 Nov 2023 03:48 )    Color: x / Appearance: x / SG: x / pH: x  Gluc: 108 mg/dL / Ketone: x  / Bili: x / Urobili: x   Blood: x / Protein: x / Nitrite: x   Leuk Esterase: x / RBC: x / WBC x   Sq Epi: x / Non Sq Epi: x / Bacteria: x    I&O's Summary    27 Nov 2023 07:01  -  28 Nov 2023 07:00  --------------------------------------------------------  IN: 2400 mL / OUT: 2050 mL / NET: 350 mL    28 Nov 2023 07:01  -  28 Nov 2023 12:29  --------------------------------------------------------  IN: 855 mL / OUT: 585 mL / NET: 270 mL      Assessment	  82y M with TBI - R frontal IPH, SDH, tSAH; s/p R decompressive hemicraniectomy 11/10.  Encephalopathy due to TBI, resp. infection, resp. distress, delirium.  Respiratory distress due to impaired bulbar function with difficulty managing secretions, aspiration PNA.  PMH of CAD s/p stents, CHF; HTN, HLD. HIT, AAA post EVAR. Parox. A.Fib, on Cardizem.   PMH of renal cell carcinoma status post left nephrectomy, bladder CA, BPH  Nonoliguric DONNIE, likely ROXANNE, critical illness; improving.  AV vegetation on TTE - unclear etiology. Several BCx negative.     Plan:  -Q2hr neuro checks   -Pain control PRN, avoid oversedation   -STAT CTH for any decline in neuro exam   -Helmet to be worn when OOB   -Cranioplasty planning, flap remains soft   -Completed course of keppra for seizure ppx   -Puree diet with moderately thick liquids   -ID following, appreciate recommendations   -holding ASA/Eliquis for now, will discuss reinstating once cranioplasty plans are set.  -Fondaparinux started 11/28 for dvt ppx  -Further medical management/supportive care per primary team    Dispo- Downgrade to SDU under Medicine. Patient accepted by Dr. Milan

## 2023-11-28 NOTE — PROGRESS NOTE ADULT - ASSESSMENT
82 y/o male with PMHx of HTN, CAD s/p stents, RCC s/p L nephrectomy, bladder CA, BPH, CHF, LBBB, vertigo, HLD, 5.5cm AAA without rupture post EVAR, paroxysmal A-fib on Eliquis, Plavix, and aspirin, early stage Alzheimer dementia transferred from [facility] 11/10/23 s/p unwitnessed fall with head strike at home found by wife on floor at 8am. CT head at [place] showed R frontal IPH, SDH, SAH at 9:00. s/p R craniectomy for ICH 11/10. Hospital course complicated by Klebsiella PNA, hypernatremia, lethargy, LUE DVT on Eliquis. Downgraded to stepdown on 11/23, re-upgraded to NSICU on 11/26 for new acute anterior falx SDH.     Plan:  -Q2hr neuro checks   -Pain control PRN, avoid oversedation   -STAT CTH for any decline in neuro exam   -Helmet to be worn when OOB   -Cranioplasty planning, flap remains soft   -Completed course of keppra for seizure ppx   -Puree diet with moderately thick liquids   -ID following, appreciate recommendations   -holding ASA/Eliquis. Ok to resume fondaparinux ppx once CrCl >30   -Further medical management/supportive care per primary team   -final plan pend discussion with neurosurgeon in AM rounds

## 2023-11-29 LAB
ANION GAP SERPL CALC-SCNC: 12 MMOL/L — SIGNIFICANT CHANGE UP (ref 5–17)
ANION GAP SERPL CALC-SCNC: 12 MMOL/L — SIGNIFICANT CHANGE UP (ref 5–17)
BLD GP AB SCN SERPL QL: SIGNIFICANT CHANGE UP
BLD GP AB SCN SERPL QL: SIGNIFICANT CHANGE UP
BUN SERPL-MCNC: 36.5 MG/DL — HIGH (ref 8–20)
BUN SERPL-MCNC: 36.5 MG/DL — HIGH (ref 8–20)
CALCIUM SERPL-MCNC: 8.6 MG/DL — SIGNIFICANT CHANGE UP (ref 8.4–10.5)
CALCIUM SERPL-MCNC: 8.6 MG/DL — SIGNIFICANT CHANGE UP (ref 8.4–10.5)
CHLORIDE SERPL-SCNC: 107 MMOL/L — SIGNIFICANT CHANGE UP (ref 96–108)
CHLORIDE SERPL-SCNC: 107 MMOL/L — SIGNIFICANT CHANGE UP (ref 96–108)
CO2 SERPL-SCNC: 21 MMOL/L — LOW (ref 22–29)
CO2 SERPL-SCNC: 21 MMOL/L — LOW (ref 22–29)
CREAT SERPL-MCNC: 1.1 MG/DL — SIGNIFICANT CHANGE UP (ref 0.5–1.3)
CREAT SERPL-MCNC: 1.1 MG/DL — SIGNIFICANT CHANGE UP (ref 0.5–1.3)
EGFR: 67 ML/MIN/1.73M2 — SIGNIFICANT CHANGE UP
EGFR: 67 ML/MIN/1.73M2 — SIGNIFICANT CHANGE UP
GLUCOSE SERPL-MCNC: 115 MG/DL — HIGH (ref 70–99)
GLUCOSE SERPL-MCNC: 115 MG/DL — HIGH (ref 70–99)
HCT VFR BLD CALC: 27.2 % — LOW (ref 39–50)
HCT VFR BLD CALC: 27.2 % — LOW (ref 39–50)
HGB BLD-MCNC: 8.8 G/DL — LOW (ref 13–17)
HGB BLD-MCNC: 8.8 G/DL — LOW (ref 13–17)
MAGNESIUM SERPL-MCNC: 1.8 MG/DL — SIGNIFICANT CHANGE UP (ref 1.6–2.6)
MAGNESIUM SERPL-MCNC: 1.8 MG/DL — SIGNIFICANT CHANGE UP (ref 1.6–2.6)
MCHC RBC-ENTMCNC: 30.6 PG — SIGNIFICANT CHANGE UP (ref 27–34)
MCHC RBC-ENTMCNC: 30.6 PG — SIGNIFICANT CHANGE UP (ref 27–34)
MCHC RBC-ENTMCNC: 32.4 GM/DL — SIGNIFICANT CHANGE UP (ref 32–36)
MCHC RBC-ENTMCNC: 32.4 GM/DL — SIGNIFICANT CHANGE UP (ref 32–36)
MCV RBC AUTO: 94.4 FL — SIGNIFICANT CHANGE UP (ref 80–100)
MCV RBC AUTO: 94.4 FL — SIGNIFICANT CHANGE UP (ref 80–100)
PHOSPHATE SERPL-MCNC: 2.3 MG/DL — LOW (ref 2.4–4.7)
PHOSPHATE SERPL-MCNC: 2.3 MG/DL — LOW (ref 2.4–4.7)
PLATELET # BLD AUTO: 286 K/UL — SIGNIFICANT CHANGE UP (ref 150–400)
PLATELET # BLD AUTO: 286 K/UL — SIGNIFICANT CHANGE UP (ref 150–400)
POTASSIUM SERPL-MCNC: 4.4 MMOL/L — SIGNIFICANT CHANGE UP (ref 3.5–5.3)
POTASSIUM SERPL-MCNC: 4.4 MMOL/L — SIGNIFICANT CHANGE UP (ref 3.5–5.3)
POTASSIUM SERPL-SCNC: 4.4 MMOL/L — SIGNIFICANT CHANGE UP (ref 3.5–5.3)
POTASSIUM SERPL-SCNC: 4.4 MMOL/L — SIGNIFICANT CHANGE UP (ref 3.5–5.3)
RBC # BLD: 2.88 M/UL — LOW (ref 4.2–5.8)
RBC # BLD: 2.88 M/UL — LOW (ref 4.2–5.8)
RBC # FLD: 12.7 % — SIGNIFICANT CHANGE UP (ref 10.3–14.5)
RBC # FLD: 12.7 % — SIGNIFICANT CHANGE UP (ref 10.3–14.5)
SODIUM SERPL-SCNC: 140 MMOL/L — SIGNIFICANT CHANGE UP (ref 135–145)
SODIUM SERPL-SCNC: 140 MMOL/L — SIGNIFICANT CHANGE UP (ref 135–145)
WBC # BLD: 10.31 K/UL — SIGNIFICANT CHANGE UP (ref 3.8–10.5)
WBC # BLD: 10.31 K/UL — SIGNIFICANT CHANGE UP (ref 3.8–10.5)
WBC # FLD AUTO: 10.31 K/UL — SIGNIFICANT CHANGE UP (ref 3.8–10.5)
WBC # FLD AUTO: 10.31 K/UL — SIGNIFICANT CHANGE UP (ref 3.8–10.5)

## 2023-11-29 PROCEDURE — 96121 NUBHVL XM PHY/QHP EA ADDL HR: CPT

## 2023-11-29 PROCEDURE — 99232 SBSQ HOSP IP/OBS MODERATE 35: CPT

## 2023-11-29 PROCEDURE — 96116 NUBHVL XM PHYS/QHP 1ST HR: CPT

## 2023-11-29 PROCEDURE — 70450 CT HEAD/BRAIN W/O DYE: CPT | Mod: 26

## 2023-11-29 RX ADMIN — Medication 100 MILLIGRAM(S): at 06:06

## 2023-11-29 RX ADMIN — Medication 50 MILLIGRAM(S): at 12:06

## 2023-11-29 RX ADMIN — MEMANTINE HYDROCHLORIDE 5 MILLIGRAM(S): 10 TABLET ORAL at 05:59

## 2023-11-29 RX ADMIN — Medication 1 TABLET(S): at 12:04

## 2023-11-29 RX ADMIN — SODIUM CHLORIDE 75 MILLILITER(S): 9 INJECTION INTRAMUSCULAR; INTRAVENOUS; SUBCUTANEOUS at 12:06

## 2023-11-29 RX ADMIN — FONDAPARINUX SODIUM 1.2 MILLIGRAM(S): 2.5 INJECTION, SOLUTION SUBCUTANEOUS at 17:35

## 2023-11-29 RX ADMIN — Medication 60 MILLIGRAM(S): at 17:36

## 2023-11-29 RX ADMIN — CHLORHEXIDINE GLUCONATE 1 APPLICATION(S): 213 SOLUTION TOPICAL at 05:59

## 2023-11-29 RX ADMIN — POLYETHYLENE GLYCOL 3350 17 GRAM(S): 17 POWDER, FOR SOLUTION ORAL at 12:04

## 2023-11-29 RX ADMIN — Medication 60 MILLIGRAM(S): at 06:06

## 2023-11-29 RX ADMIN — TAMSULOSIN HYDROCHLORIDE 0.8 MILLIGRAM(S): 0.4 CAPSULE ORAL at 22:00

## 2023-11-29 RX ADMIN — Medication 50 MILLIGRAM(S): at 22:00

## 2023-11-29 RX ADMIN — Medication 1 MILLIGRAM(S): at 12:06

## 2023-11-29 RX ADMIN — MEMANTINE HYDROCHLORIDE 5 MILLIGRAM(S): 10 TABLET ORAL at 17:35

## 2023-11-29 RX ADMIN — Medication 6 MILLIGRAM(S): at 22:00

## 2023-11-29 RX ADMIN — ATORVASTATIN CALCIUM 80 MILLIGRAM(S): 80 TABLET, FILM COATED ORAL at 22:00

## 2023-11-29 NOTE — PROGRESS NOTE ADULT - SUBJECTIVE AND OBJECTIVE BOX
[facility]                                                INFECTIOUS DISEASES  =======================================================                     Uriel Vivas#   Roman Gonzáles MD#   Shan Wilson MD*                           Jennie Huerta MD*   Stephany Segura MD*            Diplomates American Board of Internal Medicine & Infectious Diseases                  # [place] Office - Appt - Tel  [phone] Fax [phone]                * [place] Office - Appt - Tel [phone] Fax [phone]                                  Hospital Consult line:  [phone]  =======================================================      [id number]  DEUCE BALL       ID f/u- fever, leukocytosis, aspiration pneumonia  afebrile today  wbc improved    I have personally reviewed the labs and data; pertinent labs and data are listed in this note; please see below.   =======================================================  Past Medical & Surgical Hx:  =====================  PAST MEDICAL & SURGICAL HISTORY:  HTN  dx 2008      Bladder Cancer (ICD9 188.9)  TCC, dx       Hyperlipemia (ICD9 272.4)  dx       Lt Renal Neoplasm  left - s/p left nephrectomy      Hx antineoplastic chemotherapy (BCG )      Left bundle branch block      Vertigo      Heel spur, left      Abdominal aortic aneurysm (AAA)  5.5 cm      BPH (benign prostatic hyperplasia)      Renal mass, right  current - following with Dr Pamela SHEARER (coronary artery disease)      Bladder cancer, primary, with metastasis from bladder to other site  Left kidney      Acute renal failure, unspecified acute renal failure type      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)      Abdominal aortic aneurysm (AAA) without rupture      Congestive heart failure, unspecified chronicity, unspecified heart failure type      Hypertension      History of abdominal aortic aneurysm (AAA)      CAD (coronary artery disease)      Alzheimers disease      urethral Stent 2008  stent placement and removal      S/P Cystoscopy  bladder polyps removal multiple times (since ), 6/10 , 10/2011 & 10/17/2011, , 2012, last 13, 2013, 2014      S/P Tonsillectomy      History of Lt  Nephrectomy  6/10 - left      History of cystoscopy  2015      H/O abdominal aortic aneurysm repair      S/P AAA repair      History of nephrectomy  Left      Presence of stent in LAD coronary artery      History of heart artery stent  DIONICIO        Problem List:  ==========  HEALTH ISSUES - PROBLEM Dx:  Paroxysmal atrial fibrillation    DONNIE (acute kidney injury)    Abnormal echocardiogram    Endocarditis          Social Hx:  =======  no toxic habits currently    FAMILY HISTORY:  Family history of MI (myocardial infarction) (Father, Mother)  father  76y/o MI, mother  96y/o alzheimers    Family history of early CAD (Father, Mother)    no significant family history of immunosuppressive disorders in mother or father   =======================================================    REVIEW OF SYSTEMS:  CONSTITUTIONAL:  No Fever or chills  HEENT:  No diplopia or blurred vision.  No earache, sore throat or runny nose.  CARDIOVASCULAR:  No pressure, squeezing, strangling, tightness, heaviness or aching about the chest, neck, axilla or epigastrium.  RESPIRATORY:  No cough, shortness of breath  GASTROINTESTINAL:  No nausea, vomiting or diarrhea.  GENITOURINARY:  No dysuria, frequency or urgency. No Blood in urine  MUSCULOSKELETAL:  no joint aches, no muscle pain  SKIN:  No change in skin, hair or nails.  NEUROLOGIC:  No Headaches, seizures or weakness.  PSYCHIATRIC:  No disorder of thought or mood.  ENDOCRINE:  No heat or cold intolerance  HEMATOLOGICAL:  No easy bruising or bleeding.    =======================================================  Allergies    penicillin (Rash)  heparin (Other (Severe))  penicillin (Hives)  Cipro (Other)  Levaquin (Other)  heparin (Other)    Intolerances    Antibiotics:    Other medications:  acetylcysteine 20%  Inhalation 4 milliLiter(s) Inhalation every 6 hours  amantadine 100 milliGRAM(s) Oral <User Schedule>  aspirin  chewable 81 milliGRAM(s) Oral daily  atorvastatin 40 milliGRAM(s) Oral at bedtime  chlorhexidine 2% Cloths 1 Application(s) Topical <User Schedule>  diltiazem    Tablet 60 milliGRAM(s) Oral <User Schedule>  folic acid 1 milliGRAM(s) Oral daily  fondaparinux Injectable 1.2 milliGRAM(s) SubCutaneous <User Schedule>  levalbuterol Inhalation 1.25 milliGRAM(s) Inhalation every 6 hours  melatonin 3 milliGRAM(s) Oral at bedtime  metoprolol tartrate 25 milliGRAM(s) Oral <User Schedule>  Nephro-roma 1 Tablet(s) Oral daily  polyethylene glycol 3350 17 Gram(s) Oral daily  senna 2 Tablet(s) Oral at bedtime     cefepime  Injectable.   2000 milliGRAM(s) IV Push (23 @ 18:16)    cefepime  Injectable.   2000 milliGRAM(s) IV Push (11-15-23 @ 05:29)   2000 milliGRAM(s) IV Push (11-15-23 @ 17:39)   2000 milliGRAM(s) IV Push (23 @ 05:30)   2000 milliGRAM(s) IV Push (23 @ 17:10)   2000 milliGRAM(s) IV Push (23 @ 05:30)   2000 milliGRAM(s) IV Push (23 @ 17:08)   2000 milliGRAM(s) IV Push (23 @ 05:09)   2000 milliGRAM(s) IV Push (23 @ 18:04)   2000 milliGRAM(s) IV Push (23 @ 06:07)    cefTRIAXone Injectable.   1000 milliGRAM(s) IV Push (23 @ 14:03)   1000 milliGRAM(s) IV Push (11-20-23 @ 18:41)      ======================================================  Physical Exam:  ============  Vital Signs Last 24 Hrs  Vital Signs Last 24 Hrs  T(C): 37.4 (2023 18:00), Max: 37.4 (2023 18:00)  T(F): 99.3 (2023 18:00), Max: 99.3 (2023 18:00)  HR: 90 (2023 18:00) (79 - 104)  BP: 108/79 (2023 18:00) (104/61 - 159/91)  BP(mean): 87 (2023 18:00) (75 - 106)  RR: 22 (2023 18:00) (13 - 25)  SpO2: 98% (2023 18:00) (97% - 100%)    Parameters below as of 2023 16:00  Patient On (Oxygen Delivery Method): room air        General:  No acute distress. elderly male laying in bed, on o2, lethargic  HENT: rt cranial flap soft, staples intact  Neck: Supple, No lymphadenopathy.  Respiratory: Lungs are clear to auscultation, Respirations are non-labored.  Cardiovascular: Normal rate, Regular rhythm, s1 + s2 +mildred  Gastrointestinal: Soft, Non-tender, Non-distended, Normal bowel sounds.  Genitourinary: No costovertebral angle tenderness. hall in place  Integumentary: No rash. no phlebitis, LUE forearm skin tear  Neurologic:  lethargic, awake able to follow commands    =======================================================  Labs:                                        8.8    10.31 )-----------( 286      ( 2023 03:40 )             27.2       11-29    140  |  107  |  36.5<H>  ----------------------------<  115<H>  4.4   |  21.0<L>  |  1.10    Ca    8.6      2023 03:40  Phos  2.3       Mg     1.8                     Urinalysis Basic - ( 2023 03:40 )    Color: x / Appearance: x / SG: x / pH: x  Gluc: 115 mg/dL / Ketone: x  / Bili: x / Urobili: x   Blood: x / Protein: x / Nitrite: x   Leuk Esterase: x / RBC: x / WBC x   Sq Epi: x / Non Sq Epi: x / Bacteria: x                  CAPILLARY BLOOD GLUCOSE                  Culture - Blood (collected 23 @ 16:19)  Source: .Blood Blood  Final Report (23 @ 23:00):    No growth at 5 days    Culture - Sputum (collected 23 @ 16:11)  Source: ET Tube ET Tube  Gram Stain (23 @ 11:50):    Numerous polymorphonuclear leukocytes per low power field    Few Squamous epithelial cells per low power field    Few Yeast like cells per oil power field    Numerous Gram Negative Rods per oil power field  Final Report (23 @ 08:57):    Numerous Klebsiella aerogenes (Previously Enterobacter)    Normal Respiratory Isabell present  Organism: Klebsiella aerogenes (Previously Enterobacter) (23 @ 08:57)  Organism: Klebsiella aerogenes (Previously Enterobacter) (23 @ 08:57)    Sensitivities:      Method Type: ANDERS      -  Amoxicillin/Clavulanic Acid: R >16/8      -  Ampicillin: R <=8 These ampicillin results predict results for amoxicillin      -  Ampicillin/Sulbactam: R <=4/2      -  Aztreonam: S <=4      -  Cefazolin: R >16      -  Cefepime: S <=2      -  Cefotaxime: S Enterobacter cloacae, Klebsiella aerogenes, and Citrobacter freundii may develop resistance during prolonged therapy.      -  Cefoxitin: R >16      -  Ceftazidime: S Enterobacter cloacae, Klebsiella aerogenes, and Citrobacter freundii may develop resistance during prolonged therapy.      -  Ceftriaxone: S <=1 Enterobacter cloacae, Klebsiella aerogenes, and Citrobacter freundii may develop resistance during prolonged therapy.      -  Ciprofloxacin: S <=0.25      -  Ertapenem: S <=0.5      -  Gentamicin: S <=2      -  Imipenem: S <=1      -  Levofloxacin: S <=0.5      -  Meropenem: S <=1      -  Piperacillin/Tazobactam: S <=8      -  Tobramycin: S <=2      -  Trimethoprim/Sulfamethoxazole: S <=0.5/9.5    Culture - Blood (collected 23 @ 15:50)  Source: .Blood Blood  Final Report (23 @ 23:00):    No growth at 5 days       SARS-CoV-2: NotDetec (11-15-23 @ 11:40)       < from: US Duplex Venous Upper Ext Complete, Bilateral (23 @ 22:33) >  IMPRESSION:    Right upper extremity:    No acute DVT of the right upper extremity. Superficial thrombosis of the   cephalic vein with indwelling line, image 1.57.    Left upper extremity:    New DVT of one of the left upper extremity brachial veins. Along the   thrombosed left brachial vein, there is a focal extraluminal density   which may represent a hematoma, CINE 1.21 images 161-164, which is   closely apposed to the left brachial artery. Contour lobulation of the   left brachial artery in this location is noted, possibly a small   pseudoaneurysm. Recommend left upper extremity arterial duplex ultrasound   for further evaluation.    Additional persistent left cephalic vein superficial venous thrombosis.    DEBRA Sosa from Neuro ICU was informed of these findings on 2023 at   12:22AM.    < end of copied text >      < end of copied text >      < from: TTE Echo Limited or F/U (23 @ 20:01) >    Summary:   1. This is a nondiagnostic study to evaluate aortic valve because of   poor windows, patient was agitated.      As recommended earlier, Consider WHIT to evaluate Aortic Vavle.    MD Keri Electronically signed on 2023 at 9:12:23 AM      < end of copied text >    < from: TTE Echo Complete w/o Contrast w/ Doppler (23 @ 08:58) >  Summary:   1. Endocardial visualization was enhanced with intravenous echo contrast.   2. Normal global left ventricular systolic function.   3. There is mild concentric left ventricular hypertrophy.   4. Left ventricular ejection fraction, by visual estimation, is 60 to   65%.   5. Spectral Doppler shows impaired relaxation pattern of left   ventricular myocardial filling (Grade I diastolic dysfunction).   6. Elevated mean left atrial pressure.   7. Mildly enlarged right ventricle.   8. Normal left atrial size.   9. Normal right atrialsize.  10. Moderate paradoxial low gradient aortic stenosis.  11. The Dimesionless Index value is .39.  12. Moderately calcified aortic valve with mobile echodensity poorly   visualized. May represent artifact vs mobile calcification vs vegetation.   Consider WHIT if clinically indicated.  13. Mild aortic regurgitation.  14. Moderate thickening and calcification of the anterior and posterior   mitral valve leaflets.  15. Mild tricuspid regurgitation.    AdamKu    < end of copied text >      < from: CT Abdomen and Pelvis No Cont (11.22.23 @ 03:01) >  IMPRESSION:  Endovascular repair of infrarenal abdominal aorta with increase in size   of the native aneurysm sac compared to CT 4/3/2018, measuring up to 8 cm.   Heterogeneous attenuation of the native aneurysm sac is suspicious for   endoleak. However, anterior and posterior embolization coils are also new   compared with the prior CT, possibly representing prior endoleak repair.   Correlate with patient history. Consider CT with intravenous contrast to   further characterization if clinically indicated.    Otherwise no acute pathology in the chest, abdomen, or pelvis.        --- End of Report ---    < end of copied text >

## 2023-11-29 NOTE — PROGRESS NOTE ADULT - ASSESSMENT
Assessment:  82 y/o male with PMHx of HTN, CAD s/p stents, RCC s/p L nephrectomy, bladder CA, BPH, CHF, LBBB, vertigo, HLD, 5.5cm AAA without rupture post EVAR, paroxysmal A-fib on Eliquis, Plavix, and aspirin, early stage Alzheimer dementia transferred from [facility] 11/10/23 s/p unwitnessed fall with head strike at home found by wife on floor at 8am. CT head at [place] showed R frontal IPH, SDH, SAH at 9:00. s/p R craniectomy for ICH 11/10. Hospital course complicated by Klebsiella PNA, hypernatremia, lethargy, LUE DVT on Eliquis. Downgraded to stepdown on 11/23, re-upgraded to NSICU on 11/26 for new acute anterior falx SDH.  - Exam grossly stable      Plan:  - Discussed with Dr. Larios  - Q2 hour neuro checks  - SBP goal normotensive 100-160  - Helmet when OOB  - DVT prophylaxis: SCDs, fondaparinux   - Cranioplasty planning, flap remains full   - Further care per primary team

## 2023-11-29 NOTE — PROGRESS NOTE ADULT - ASSESSMENT
82y M with PMH HTN, CAD s/p stents, renal cell carcinoma status post left nephrectomy, bladder CA, BPH, CHF, LBBB, vertigo,  HLD, 5.5cm AAA without rupture post EVAR, paroxysmal A-fib on Eliquis, Plavix, and aspirin, early stage Alzheimer dementia transferred from [facility] s/p unwitnessed fall with head strike at home found by wife on floor at 8am. Patient brought to urgent care by wife and had 5 staples to posterior scalp but was instructed to go to nearest ED.  CT head at [place] showed R frontal IPH, SDH, SAH s/p decompressive craniectomy  ID called for worsening leukocytosis  Patient has been having intermittent fever during this admission, started on cefepime for klebsiella aerogenes tracheobronchitis, de-escalated to ceftriaxone due to concern for mental status change on cefepime. Noted with uptrending wbc concern for ongoing aspiration and also found to have DVT    Leukocytosis  Fever  Tracheobronchitis, concern for ongoing aspiration  Penicillin allergy-unknown  SDH  LUe DVT and cephalic vein superficial thrombosis  RUE cephalic vein superficial thrombosis        - afebrile and wbc improved  - completed meropenem x 7 days  - UA from 11/17 is neg  - repeat BCX ngtd  - repeat sputum CX + yeast, will defer treatment for now   - f/u Mri head-d/w team clinically low suspicion for CNS infection or surgical site infection. d/w team pt with waxing and waning mental status  - CT C/Ap  reported endovascular repair with increase in size of aneurysmal sac up to 8cm suspicious for endoleak. consider ct with contrast, vascular f/u  - initial TTE with ?  AV vegetation vs calcification vs artifact, repeat TTE 11/21/23 nondiagnostic, WHIT was recommended by neuro however patient daughter was hesitant.   - bcx remain negative  - WHIT pending, f/u cardiology  - Trend Fever  - Trend Leukocytosis      d/w Dr Golden,, pharmacy, wife at bedside  please call with questions

## 2023-11-29 NOTE — PROGRESS NOTE ADULT - ASSESSMENT
83M with HTN, CAD s/p PCO, RCC s/p left nephrectomy, bladder Ca, BPH, CHF, Vertigo, HLD, AAA s/p EVAR, parox Afib, h/o HIT, Alzheimers transferred s/p fall with Rt frontal IPH. Initially had hemicraniectomy,  with post op course complicated by RVR, asp PNA, hypoxic resp failure, LUE DVT and questionable increase in AAA size. Started on AC with waxing/waning mental status prompting head imaging with evidcen of new bleed. Monitored in NICU and now being transferred back to Medicine     [facility] -s/p Rt hemicraniotomy    AC for DVT/Afib with demonstration of new bleed  Continue Q2 neurochecks in step down  Close monitoring of mental status, per NICU expectation of waxing/waning mental status,  STAT CT head if mental status acutely changes   Strict BP parameters (maintain SBP < 160, avoid rapid fluctuations)  Continue Amantadine   Continue modified diet     Parox Afib/ HTN/HLD   Continue Metoprolol 50 mg bid  Continue Statin   Labetalol / Hydralazine PRN   Continue Fondaparinux. Transition to DOAC when cleared by NSx     Questionable veg on TTE - discussed with iD- recommends WHIT  family is hesitant discussed with wife - await call back from daughter  Was being considered for WHIT per chart  f/u with Cardiology in am to determine if plan for WHIT     Acute DVT   Continue AC as above, avoid heparin products 2/2 h/o HIT  DOAC when cleared by NSx    Normocytic Anemia   Stools visualized. Normal color, no melena / evidence of GI blood loss  Repeat in am along with T&S,  transfuse to goal > 8 given h/o CAD    AAA s/p EVAR   Imaging reviewed by Vascular surgery  No intervention planned at present  Recommended obtaining prior imaging from pts Vascular surgeon (Dr Heredia for comparison)     DONNIE- resolved   Initial Cr wnl however has maintained a Cr of ~1.5-1.6- now today improved 1  Suspect may have CKD II  Monitor and renally dose all medications     Acute hypoxic resp failure 2/2 suspected asp PNA   Resolved   s/p completion of IV Abx   ID recs reviewed

## 2023-11-29 NOTE — PROGRESS NOTE ADULT - SUBJECTIVE AND OBJECTIVE BOX
DEUCE BALL Patient is a 83y old  Male who presents with a chief complaint of s/p unwitnessed fall with head strike (29 Nov 2023 19:06)     HPI:  82y M with PMH HTN, CAD s/p stents, renal cell carcinoma status post left nephrectomy, bladder CA, BPH, CHF, LBBB, vertigo,  HLD, 5.5cm AAA without rupture post EVAR, paroxysmal A-fib on Eliquis, Plavix, and aspirin, early stage Alzheimer dementia transferred from [facility] s/p unwitnessed fall with head strike at home found by wife on floor at 8am. Patient brought to urgent care by wife and had 5 staples to posterior scalp but was instructed to go to nearest ED.  CT head at [place] showed R frontal IPH, SDH, SAH at 9:00. CTA stroke protocol not performed at [facility]. Patient BIB on 6L NC.  Pt GCS 15 on arrival, A&Ox2 on arrival. After initial assessment, patient noted to be vomiting enroute to CT scanner.          (10 Nov 2023 11:04)    The patient was seen and evaluated - not really able to give ROS or history   The patient is in no acute distress. but ill appearing tired and sick looking   per wife at bedside- "oh he' s much better "  states her daughter makes the decision for the possible WHIT - said her daughter is afraid that es too sick to tolerate the WHIT- tried calling daughter         I&O's Summary    28 Nov 2023 07:01  -  29 Nov 2023 07:00  --------------------------------------------------------  IN: 2520 mL / OUT: 2365 mL / NET: 155 mL    29 Nov 2023 07:01  -  29 Nov 2023 19:35  --------------------------------------------------------  IN: 1550 mL / OUT: 1060 mL / NET: 490 mL      Allergies    penicillin (Rash)  heparin (Other (Severe))  penicillin (Hives)  Cipro (Other)  Levaquin (Other)  heparin (Other)    Intolerances      HEALTH ISSUES - PROBLEM Dx:  Paroxysmal atrial fibrillation    DONNIE (acute kidney injury)    Abnormal echocardiogram    Endocarditis          PAST MEDICAL & SURGICAL HISTORY:  HTN  dx 2008      Bladder Cancer (ICD9 188.9)  TCC, dx 1999      Hyperlipemia (ICD9 272.4)  dx 2008      Lt Renal Neoplasm  left - s/p left nephrectomy      Hx antineoplastic chemotherapy (BCG 2012)      Left bundle branch block      Vertigo      Heel spur, left      Abdominal aortic aneurysm (AAA)  5.5 cm      BPH (benign prostatic hyperplasia)      Renal mass, right  current - following with Dr Santiago      CAD (coronary artery disease)      Bladder cancer, primary, with metastasis from bladder to other site  Left kidney      Acute renal failure, unspecified acute renal failure type      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)      Abdominal aortic aneurysm (AAA) without rupture      Congestive heart failure, unspecified chronicity, unspecified heart failure type      Hypertension      History of abdominal aortic aneurysm (AAA)      CAD (coronary artery disease)      Alzheimers disease      urethral Stent 7/2008  stent placement and removal      S/P Cystoscopy  bladder polyps removal multiple times (since 1999), 6/10 , 10/2011 & 10/17/2011, 5/12, 11/2012, last 5/13/13, 12/2013, 7/2014      S/P Tonsillectomy      History of Lt  Nephrectomy  6/10 - left      History of cystoscopy  April 2015      H/O abdominal aortic aneurysm repair      S/P AAA repair      History of nephrectomy  Left      Presence of stent in LAD coronary artery      History of heart artery stent  DIONICIO              Vital Signs Last 24 Hrs  T(C): 37.4 (29 Nov 2023 18:00), Max: 37.4 (29 Nov 2023 18:00)  T(F): 99.3 (29 Nov 2023 18:00), Max: 99.3 (29 Nov 2023 18:00)  HR: 90 (29 Nov 2023 18:00) (79 - 104)  BP: 108/79 (29 Nov 2023 18:00) (104/61 - 159/91)  BP(mean): 87 (29 Nov 2023 18:00) (75 - 106)  RR: 22 (29 Nov 2023 18:00) (13 - 25)  SpO2: 98% (29 Nov 2023 18:00) (97% - 100%)    Parameters below as of 29 Nov 2023 16:00  Patient On (Oxygen Delivery Method): room air    T(C): 37.4 (11-29-23 @ 18:00), Max: 37.4 (11-29-23 @ 18:00)  HR: 90 (11-29-23 @ 18:00) (79 - 104)  BP: 108/79 (11-29-23 @ 18:00) (104/61 - 159/91)  RR: 22 (11-29-23 @ 18:00) (13 - 25)  SpO2: 98% (11-29-23 @ 18:00) (97% - 100%)  Wt(kg): --    PHYSICAL EXAM:    GENERAL: NAD, frail elderly   HEAD:  Atraumatic,   ENMT:  Moist mucous membranes,   NECK: Supple,  NERVOUS SYSTEM:  lethargic barely  Moves upper and lower extremities; CNS-II-XII  CHEST/LUNG: Clear to auscultation bilaterally; No rales, rhonchi, wheezing,   HEART: Regular rate and rhythm;    ABDOMEN: Soft, Nontender, Nondistended; Bowel sounds present  EXTREMITIES:  Peripheral Pulses, No  cyanosis, or edema  psychiatry- mood and affect flAT    acetaminophen     Tablet .. 650 milliGRAM(s) Oral every 6 hours PRN  amantadine 100 milliGRAM(s) Oral <User Schedule>  atorvastatin 80 milliGRAM(s) Oral at bedtime  chlorhexidine 2% Cloths 1 Application(s) Topical <User Schedule>  diltiazem    Tablet 60 milliGRAM(s) Oral <User Schedule>  folic acid 1 milliGRAM(s) Oral daily  fondaparinux Injectable 1.2 milliGRAM(s) SubCutaneous <User Schedule>  hydrALAZINE Injectable 10 milliGRAM(s) IV Push every 2 hours PRN  labetalol Injectable 10 milliGRAM(s) IV Push every 2 hours PRN  melatonin 6 milliGRAM(s) Oral at bedtime  memantine 5 milliGRAM(s) Oral two times a day  metoprolol tartrate 50 milliGRAM(s) Oral <User Schedule>  Nephro-roma 1 Tablet(s) Oral daily  polyethylene glycol 3350 17 Gram(s) Oral daily  senna 2 Tablet(s) Oral at bedtime  sodium chloride 0.9%. 1000 milliLiter(s) IV Continuous <Continuous>  tamsulosin 0.8 milliGRAM(s) Oral at bedtime      LABS:                          8.8    10.31 )-----------( 286      ( 29 Nov 2023 03:40 )             27.2     11-29    140  |  107  |  36.5<H>  ----------------------------<  115<H>  4.4   |  21.0<L>  |  1.10    Ca    8.6      29 Nov 2023 03:40  Phos  2.3     11-29  Mg     1.8     11-29              Urinalysis Basic - ( 29 Nov 2023 03:40 )    Color: x / Appearance: x / SG: x / pH: x  Gluc: 115 mg/dL / Ketone: x  / Bili: x / Urobili: x   Blood: x / Protein: x / Nitrite: x   Leuk Esterase: x / RBC: x / WBC x   Sq Epi: x / Non Sq Epi: x / Bacteria: x      CAPILLARY BLOOD GLUCOSE          RADIOLOGY & ADDITIONAL TESTS:      Consultant notes reviewed    Case discussed with consultant/provider/ family /patient

## 2023-11-29 NOTE — PROGRESS NOTE ADULT - ASSESSMENT
Pt continues to present with disorientation, suggestive of cognitive impairment secondary to stroke, pre-existing cognitive weaknesses, and likely delirium. Writer discussed delirium precautions with medical team, and encouraged team to keep pt alert and engaged throughout the day when possible, and for family to keep pt engaged throughout the afternoon.     Recommendations:   1. Continued monitoring by Neuropsychology to assess changes in his cognition throughout his hospitalization.  2. Pt's family and care team should provide frequent orientation cues to pt.   3. Pt should remain on consistent sleep-wake schedule. His room should be well-lit throughout the day, and naps should be avoided throughout the day.

## 2023-11-29 NOTE — PROGRESS NOTE ADULT - SUBJECTIVE AND OBJECTIVE BOX
Patient is a 83y old  Male who presents with a chief complaint of s/p unwitnessed fall with head strike (29 Nov 2023 16:01)    HPI:  82y M with PMH HTN, CAD s/p stents, renal cell carcinoma status post left nephrectomy, bladder CA, BPH, CHF, LBBB, vertigo,  HLD, 5.5cm AAA without rupture post EVAR, paroxysmal A-fib on Eliquis, Plavix, and aspirin, early stage Alzheimer dementia transferred from [facility] s/p unwitnessed fall with head strike at home found by wife on floor at 8am. Patient brought to urgent care by wife and had 5 staples to posterior scalp but was instructed to go to nearest ED.  CT head at [place] showed R frontal IPH, SDH, SAH at 9:00. CTA stroke protocol not performed at [facility]. Patient BIB on 6L NC.  Pt GCS 15 on arrival, A&Ox2 on arrival. After initial assessment, patient noted to be vomiting enroute to CT scanner.  (10 Nov 2023 11:04)      Interval history:  Patient seen and examined by neurosurgery team. Patient reports he feels well, no acute complaints at this time. Patient underwent stereotactic CTH for cranioplasty planning.       Vital Signs Last 24 Hrs  T(C): 37.4 (29 Nov 2023 18:00), Max: 37.4 (29 Nov 2023 18:00)  T(F): 99.3 (29 Nov 2023 18:00), Max: 99.3 (29 Nov 2023 18:00)  HR: 90 (29 Nov 2023 18:00) (79 - 104)  BP: 108/79 (29 Nov 2023 18:00) (104/61 - 159/91)  BP(mean): 87 (29 Nov 2023 18:00) (75 - 106)  RR: 22 (29 Nov 2023 18:00) (13 - 25)  SpO2: 98% (29 Nov 2023 18:00) (97% - 100%)    Parameters below as of 29 Nov 2023 16:00  Patient On (Oxygen Delivery Method): room air      Physical Exam:  Constitutional: NAD, lying in bed  Neuro  * Mental Status:  EO to voice, following commands, A&O x1 (name)   * Cranial Nerves: PERRL, no gaze deviation  * Motor: RUE 4+/5, RLE 4/5, LUE/LLE WD   * Sensory: Sensation grossly intact to noxious stimuli  * Reflexes: not assessed   Head: incision C/D/I, flap full but soft       LABS:                        8.8    10.31 )-----------( 286      ( 29 Nov 2023 03:40 )             27.2     11-29    140  |  107  |  36.5<H>  ----------------------------<  115<H>  4.4   |  21.0<L>  |  1.10    Ca    8.6      29 Nov 2023 03:40  Phos  2.3     11-29  Mg     1.8     11-29    CULTURES:  Culture Results:   No growth at 5 days (11-22 @ 00:08)  Culture Results:   Normal Respiratory Isabell present *!* (11-22 @ 00:08)      Medications:  MEDICATIONS  (STANDING):  amantadine 100 milliGRAM(s) Oral <User Schedule>  atorvastatin 80 milliGRAM(s) Oral at bedtime  chlorhexidine 2% Cloths 1 Application(s) Topical <User Schedule>  diltiazem    Tablet 60 milliGRAM(s) Oral <User Schedule>  folic acid 1 milliGRAM(s) Oral daily  fondaparinux Injectable 1.2 milliGRAM(s) SubCutaneous <User Schedule>  melatonin 6 milliGRAM(s) Oral at bedtime  memantine 5 milliGRAM(s) Oral two times a day  metoprolol tartrate 50 milliGRAM(s) Oral <User Schedule>  Nephro-roma 1 Tablet(s) Oral daily  polyethylene glycol 3350 17 Gram(s) Oral daily  senna 2 Tablet(s) Oral at bedtime  sodium chloride 0.9%. 1000 milliLiter(s) (75 mL/Hr) IV Continuous <Continuous>  tamsulosin 0.8 milliGRAM(s) Oral at bedtime    MEDICATIONS  (PRN):  acetaminophen     Tablet .. 650 milliGRAM(s) Oral every 6 hours PRN Temp greater or equal to 38C (100.4F), Mild Pain (1 - 3)  hydrALAZINE Injectable 10 milliGRAM(s) IV Push every 2 hours PRN SBP>160  labetalol Injectable 10 milliGRAM(s) IV Push every 2 hours PRN SBP>160      RADIOLOGY & ADDITIONAL STUDIES:  < from: CT Head No Cont (11.29.23 @ 08:58) >  IMPRESSION: Stable intracranial hemorrhages.    --- End of Report ---  AUSTIN RIVERA MD; Attending Radiologist  This document has been electronically signed. Nov 29 2023  9:06AM    < end of copied text >

## 2023-11-29 NOTE — PROGRESS NOTE ADULT - SUBJECTIVE AND OBJECTIVE BOX
Name: DEUCE BALL  Age: 83y  Gender: Male  Admitting Diagnosis: Nontraumatic intracerebral hemorrhage [I61.9]  NONTRAUMATIC INTRACEREBRAL HEMORRHAGE, UNSPECIFIED      Current Diagnoses:   HTN dx   Bladder Cancer   TCC, dx   Hyperlipemia dx   Lt Renal Neoplasm - s/p left nephrectomy  Hx antineoplastic chemotherapy (BCG )  Left bundle branch block  Vertigo  Heel spur, left  Abdominal aortic aneurysm  BPH (benign prostatic hyperplasia)  Renal mass, right  CAD (coronary artery disease)  Bladder cancer, primary, with metastasis from bladder to other site  Left kidney  Acute renal failure, unspecified acute renal failure type  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)  Congestive heart failure, unspecified chronicity, unspecified heart failure type  Alzheimers disease  Hemorrhage in the brain  Paroxysmal atrial fibrillation  DONNIE (acute kidney injury)  Abnormal echocardiogram  Endocarditis  Decompressive craniectomy  urethral Stent 2008 stent placement and removal  S/P Cystoscopy  bladder polyps removal multiple times (since ), 6/10 , 10/2011 & 10/17/2011, , 2012, last 13, 2013, 2014  S/P Tonsillectomy  History of Lt  Nephrectomy  6/10 - left  History of cystoscopy  2015  H/O abdominal aortic aneurysm repair  Presence of stent in LAD coronary artery  History of heart artery stent  DIONICIO  Endocarditis    HPI: Pt is an 82-year-old, right-handed male with a history of HTN, CAD s/p stents, renal cell carcinoma status post left nephrectomy, bladder CA, BPH, CHF, LBBB, vertigo, HLD, 5.5cm AAA without rupture post EVAR, paroxysmal A-fib on Eliquis, Plavix, and aspirin, early stage Alzheimer dementia. On 11/10/2023 he was transferred from [facility] s/p unwitnessed fall with head strike at home found by wife on floor at 8am. CT head at [place] showed right frontal IPH, SDH, SAH at 9:00. CTA stroke protocol not performed at [facility]. Patient BIB on 6L NC.  Pt GCS 15 on arrival, A&Ox2 on arrival. He underwent right decompressive hemicraniectomy on 11/10/2023. On 2023, MRI of brain revealed enlargement of anterior falx hemorrhage.    Reason for Referral: To assess the nature and extent of any cognitive weaknesses and to inform treatment planning.    Current Medications: acetaminophen     Tablet .. 650 milliGRAM(s) Oral every 6 hours PRN Temp greater or equal to 38C (100.4F), Mild Pain (1 - 3)  amantadine 100 milliGRAM(s) Oral <User Schedule>  atorvastatin 80 milliGRAM(s) Oral at bedtime  chlorhexidine 2% Cloths 1 Application(s) Topical <User Schedule>  diltiazem    Tablet 60 milliGRAM(s) Oral <User Schedule>  folic acid 1 milliGRAM(s) Oral daily  hydrALAZINE Injectable 10 milliGRAM(s) IV Push every 2 hours PRN SBP>160  labetalol Injectable 10 milliGRAM(s) IV Push every 2 hours PRN SBP>160  levalbuterol Inhalation 1.25 milliGRAM(s) Inhalation every 6 hours  melatonin 3 milliGRAM(s) Oral at bedtime  meropenem Injectable 1000 milliGRAM(s) IV Push every 12 hours  meropenem Injectable      metoprolol tartrate 50 milliGRAM(s) Oral <User Schedule>  Nephro-roma 1 Tablet(s) Oral daily  polyethylene glycol 3350 17 Gram(s) Oral daily  senna 2 Tablet(s) Oral at bedtime  sodium chloride 0.9%. 1000 milliLiter(s) IV Continuous <Continuous>  tamsulosin 0.8 milliGRAM(s) Oral at bedtime    Neuroimaging:   CT BRAIN 11/10/2023    IMPRESSION:  1.Large parenchymal hemorrhage centered in the right frontal lobe   resulting in midline shift to the left of approximately 0.5 cm.  2.  Multiple extra-axial hematomas as discussed above.  3.  Scattered subarachnoid blood along the right cerebral hemisphere.    CT BRAIN 2023  IMPRESSION: Status post evacuation of a right-sided intraparenchymal   hemorrhage. Multicompartment intracranial hemorrhages improved to stable.   Status post right-sided subdural drainage catheter removal. New small   hypodense subdural collection at the craniectomy site.    MR BRAIN 2023    IMPRESSION:   RIGHT frontoparietal craniectomy with underlying   intracranial hemorrhage and edema within the RIGHT frontal lobe.   Overlying small RIGHT subdural hemorrhage is also unchanged. Scant   hemorrhage in the dependent portions of the BILATERAL lateral ventricles   and minimal subarachnoid hemorrhage seen in the BILATERAL frontal and   parietal lobes. Moderate periventricular and deep white matter ischemia   is noted. Encephalomalacia and gliosis seen in the anterior frontal lobes   bilaterally.Tiny acute lacunar infarction in the LEFT cerebellum and   tiny acute cortical infarction in the RIGHT parietal lobe. Restricted   diffusion also noted about the surgical cavity suggesting infarcted   parenchyma.    CT BRAIN 2023  IMPRESSION: Improved to stable multicompartment intracranial hemorrhages.   Right-sided subdural hygroma, larger in size.    CT BRAIN 2023  IMPRESSION: Mixed attenuating subdural hematoma again seen with some   expected postoperative changes    Evolving area of parenchymal hemorrhage involving the right frontal   region.    CT Head No Cont 2023    IMPRESSION: Stable multicompartment intracranial hemorrhages. Stable   right-sided subdural collection.    CT BRAIN 2023    No change since 2023. Stereotactic protocol for cranioplasty.    CT BRAIN 2023    IMPRESSION: Slight interval enlargement of hemorrhage along the anterior   falx when compared with 2023. Recommend short-term interval   follow-up CT examination.    CT BRAIN 2023    IMPRESSION: Overall stable follow-up CT exam when compared with the most   recent prior CT study.    CT BRAIN 2023    IMPRESSION: Grossly stable CT examination of the head when compared prior imaging.    MSE:  Appearance- Appropriately dressed and groomed.  Behavior- Appeared somnolent, and examiner needed to awaken pt upon entry. He remained fairly engaged throughout session.  Affect- Appropriate, constricted.  Mood- Euthymic.  Speech - Low volume; rate, rhythm, and prosody were WNL.  Thought Process- Linear.  Thought Content- WNL.  Effort - Adequate.    Summary of Brief Screen: Pt correctly identified his name and . He incorrectly stated his age (42 years), the day of the week, date, month, year, location, and [place] president.     Per discussion with nursing staff and PA, pt has appeared lethargic throughout the morning. He has not been consistently oriented to place or time. Team denied evidence of hallucinations. Team has been attempting to keep pt alert throughout the day, and family has been visiting in the afternoons.

## 2023-11-30 PROCEDURE — 99233 SBSQ HOSP IP/OBS HIGH 50: CPT | Mod: GC

## 2023-11-30 PROCEDURE — 99233 SBSQ HOSP IP/OBS HIGH 50: CPT

## 2023-11-30 RX ORDER — AMANTADINE HCL 100 MG
150 CAPSULE ORAL
Refills: 0 | Status: DISCONTINUED | OUTPATIENT
Start: 2023-11-30 | End: 2023-12-01

## 2023-11-30 RX ORDER — AMANTADINE HCL 100 MG
150 CAPSULE ORAL
Refills: 0 | Status: DISCONTINUED | OUTPATIENT
Start: 2023-11-30 | End: 2023-11-30

## 2023-11-30 RX ADMIN — Medication 60 MILLIGRAM(S): at 05:43

## 2023-11-30 RX ADMIN — Medication 60 MILLIGRAM(S): at 17:19

## 2023-11-30 RX ADMIN — FONDAPARINUX SODIUM 1.2 MILLIGRAM(S): 2.5 INJECTION, SOLUTION SUBCUTANEOUS at 17:19

## 2023-11-30 RX ADMIN — POLYETHYLENE GLYCOL 3350 17 GRAM(S): 17 POWDER, FOR SOLUTION ORAL at 13:27

## 2023-11-30 RX ADMIN — CHLORHEXIDINE GLUCONATE 1 APPLICATION(S): 213 SOLUTION TOPICAL at 05:42

## 2023-11-30 RX ADMIN — ATORVASTATIN CALCIUM 80 MILLIGRAM(S): 80 TABLET, FILM COATED ORAL at 22:23

## 2023-11-30 RX ADMIN — Medication 1 TABLET(S): at 13:26

## 2023-11-30 RX ADMIN — TAMSULOSIN HYDROCHLORIDE 0.8 MILLIGRAM(S): 0.4 CAPSULE ORAL at 22:23

## 2023-11-30 RX ADMIN — MEMANTINE HYDROCHLORIDE 5 MILLIGRAM(S): 10 TABLET ORAL at 17:19

## 2023-11-30 RX ADMIN — Medication 50 MILLIGRAM(S): at 13:27

## 2023-11-30 RX ADMIN — Medication 1 MILLIGRAM(S): at 13:26

## 2023-11-30 RX ADMIN — Medication 6 MILLIGRAM(S): at 22:23

## 2023-11-30 RX ADMIN — MEMANTINE HYDROCHLORIDE 5 MILLIGRAM(S): 10 TABLET ORAL at 05:43

## 2023-11-30 RX ADMIN — SENNA PLUS 2 TABLET(S): 8.6 TABLET ORAL at 22:23

## 2023-11-30 RX ADMIN — Medication 100 MILLIGRAM(S): at 05:43

## 2023-11-30 NOTE — PROGRESS NOTE ADULT - SUBJECTIVE AND OBJECTIVE BOX
seen for ICH    no events per Rn  trying to pick at head staples      MEDICATIONS  (STANDING):  amantadine 150 milliGRAM(s) Oral <User Schedule>  atorvastatin 80 milliGRAM(s) Oral at bedtime  chlorhexidine 2% Cloths 1 Application(s) Topical <User Schedule>  diltiazem    Tablet 60 milliGRAM(s) Oral <User Schedule>  folic acid 1 milliGRAM(s) Oral daily  fondaparinux Injectable 1.2 milliGRAM(s) SubCutaneous <User Schedule>  melatonin 6 milliGRAM(s) Oral at bedtime  memantine 5 milliGRAM(s) Oral two times a day  metoprolol tartrate 50 milliGRAM(s) Oral <User Schedule>  Nephro-roma 1 Tablet(s) Oral daily  polyethylene glycol 3350 17 Gram(s) Oral daily  senna 2 Tablet(s) Oral at bedtime  tamsulosin 0.8 milliGRAM(s) Oral at bedtime    MEDICATIONS  (PRN):  acetaminophen     Tablet .. 650 milliGRAM(s) Oral every 6 hours PRN Temp greater or equal to 38C (100.4F), Mild Pain (1 - 3)  hydrALAZINE Injectable 10 milliGRAM(s) IV Push every 2 hours PRN SBP>160  labetalol Injectable 10 milliGRAM(s) IV Push every 2 hours PRN SBP>160      Allergies    penicillin (Rash)  heparin (Other (Severe))  penicillin (Hives)  Cipro (Other)  Levaquin (Other)  heparin (Other)        Vital Signs Last 24 Hrs  T(C): 36.7 (30 Nov 2023 10:00), Max: 37.6 (29 Nov 2023 22:00)  T(F): 98 (30 Nov 2023 10:00), Max: 99.7 (29 Nov 2023 22:00)  HR: 69 (30 Nov 2023 10:00) (69 - 94)  BP: 139/61 (30 Nov 2023 10:00) (104/61 - 152/82)  BP(mean): 83 (30 Nov 2023 10:00) (65 - 120)  RR: 14 (30 Nov 2023 10:00) (12 - 25)  SpO2: 99% (30 Nov 2023 10:00) (94% - 100%)    Parameters below as of 30 Nov 2023 10:00  Patient On (Oxygen Delivery Method): room air        PHYSICAL EXAM:    GENERAL: NAD  CHEST/LUNG: Clear to ausculation bilaterally  HEART: Regular rate and rhythm; S1 S2;  ABDOMEN: Soft, Nontender,  Bowel sounds present  EXTREMITIES:  no edema  gu hall  NERVOUS SYSTEM:  Alert & Oriented X1 (self), once awakened can follow basic commands.  1/5 LEft side 4/5 RUE/RLE    LABS:                        8.8    10.31 )-----------( 286      ( 29 Nov 2023 03:40 )             27.2     11-29    140  |  107  |  36.5<H>  ----------------------------<  115<H>  4.4   |  21.0<L>  |  1.10    Ca    8.6      29 Nov 2023 03:40  Phos  2.3     11-29  Mg     1.8     11-29        Urinalysis Basic - ( 29 Nov 2023 03:40 )    Color: x / Appearance: x / SG: x / pH: x  Gluc: 115 mg/dL / Ketone: x  / Bili: x / Urobili: x   Blood: x / Protein: x / Nitrite: x   Leuk Esterase: x / RBC: x / WBC x   Sq Epi: x / Non Sq Epi: x / Bacteria: x        CAPILLARY BLOOD GLUCOSE            RADIOLOGY & ADDITIONAL TESTS:

## 2023-11-30 NOTE — PROGRESS NOTE ADULT - NS ATTEND AMEND GEN_ALL_CORE FT
Neurosurgery Attending Attestation:    Patient seen and examined at bedside. Agree with plan and note as documented above.    cranioplasty planning, likely next Thursday, agree with AC in the meantime given long interval between planned case.    -Marty Larios MD

## 2023-11-30 NOTE — PROGRESS NOTE ADULT - ASSESSMENT
Assessment:  82 y/o male with PMHx of HTN, CAD s/p stents, RCC s/p L nephrectomy, bladder CA, BPH, CHF, LBBB, vertigo, HLD, 5.5cm AAA without rupture post EVAR, paroxysmal A-fib on Eliquis, Plavix, and aspirin, early stage Alzheimer dementia transferred from [facility] 11/10/23 s/p unwitnessed fall with head strike at home found by wife on floor at 8am. CT head at [place] showed R frontal IPH, SDH, SAH at 9:00. s/p R craniectomy for ICH 11/10. Hospital course complicated by Klebsiella PNA, hypernatremia, lethargy, LUE DVT on Eliquis. Downgraded to stepdown on 11/23, re-upgraded to NSICU on 11/26 for new acute anterior falx SDH. Downgraded back to floor on 11/28, stable.      Plan:  - Q2 hour neuro checks  - Helmet when OOB  - SBP goal normotensive 100-160  - Cranioplasty planning, flap remains full, CTH done and sent to preethi rep  - DVT prophylaxis: SCDs, fondaparinux 1.2mg QID.  - OK to restart AC from nsx standpoint (Eliquis 5BID recommended by pharmacy instead of full dose fondaparinux due to renal function). Cranioplasty planning for likely OR next Thursday 12/7 tentatively. HOLD AC starting 12/4 night. D/w Dr. Larios.  - Further care per primary team    Assessment:  82 y/o male with PMHx of HTN, CAD s/p stents, RCC s/p L nephrectomy, bladder CA, BPH, CHF, LBBB, vertigo, HLD, 5.5cm AAA without rupture post EVAR, paroxysmal A-fib on Eliquis, Plavix, and aspirin, early stage Alzheimer dementia transferred from [facility] 11/10/23 s/p unwitnessed fall with head strike at home found by wife on floor at 8am. CT head at [place] showed R frontal IPH, SDH, SAH at 9:00. s/p R craniectomy for ICH 11/10. Hospital course complicated by Klebsiella PNA, hypernatremia, lethargy, LUE DVT on Eliquis. Downgraded to stepdown on 11/23, re-upgraded to NSICU on 11/26 for new acute anterior falx SDH. Downgraded back to floor on 11/28, stable.      Plan:  - Q2 hour neuro checks  - Helmet when OOB  - SBP goal normotensive 100-160  - Cranioplasty planning, flap remains full, CTH done and sent to preethi rep  - DVT prophylaxis: SCDs, fondaparinux 1.2mg QID.  - OK to restart AC from nsx standpoint (Eliquis 5BID recommended by pharmacy instead of full dose fondaparinux due to renal function). Cranioplasty planning for likely OR next Thursday 12/7 tentatively. HOLD AC starting 12/4 night. D/w Dr. Larios.  - D/w Dr. Diego regarding AC  -  Further care per primary team

## 2023-11-30 NOTE — PROGRESS NOTE ADULT - SUBJECTIVE AND OBJECTIVE BOX
Questionable veg on WHIT   Underwent stereotactic CTH for cranioplasty   Namenda added, melatonin increased   VSS, intermittently tachycardic, afebrile     FUNCTIONAL PROGRESS  SLP 11/26   Speech Language Pathology Recommendations: 1. Minced/moist diet w/ moderately thick liquids 2. 1:1 assist for PO3. Meds crushed in puree 4. STRICT aspiration precautions 5. upright for PO, slow rate, small bites/sips, alternate solids/liquids 6. Pt must be AWAKE/ALERT FOR PO 7. D/C PO diet w/ overt s/s of penetration/aspiration pending reconsult from this department 8. SLP to follow     PT 11/29   Bed Mobility  Bed Mobility Training Sit-to-Supine: maximum assist (25% patient effort);  2 person assist;  verbal cues;  nonverbal cues (demo/gestures)  Bed Mobility Training Supine-to-Sit: maximum assist (25% patient effort);  2 person assist;  verbal cues;  nonverbal cues (demo/gestures);  +helmet on  Bed Mobility Training Limitations: decreased ROM;  decreased strength;  impaired balance;  impaired postural control;  impaired motor control;  impaired coordination    Therapeutic Exercise  Therapeutic Exercise Detail: pt dangled at edge of bed for 5 minutes with maxAx1, performed right UE/LE active ROM, worked on cervical extension fatigue noted.     OT 11/28  Bathing Training:     · Level of [place]	dependent (less than 25% patients effort)    Upper Body Dressing Training:     · Level of [place]	dependent (less than 25% patients effort)    Lower Body Dressing Training:     · Level of [place]	dependent (less than 25% patients effort)    Toilet Hygiene Training:     · Level of [place]	dependent (less than 25% patients effort)    Grooming Training:     · Level of [place]	maximum assist (25% patients effort)        VITALS  T(C): 36.8 (11-30-23 @ 08:00), Max: 37.6 (11-29-23 @ 22:00)  HR: 74 (11-30-23 @ 08:00) (72 - 104)  BP: 111/65 (11-30-23 @ 08:00) (104/61 - 152/82)  RR: 12 (11-30-23 @ 08:00) (12 - 25)  SpO2: 97% (11-30-23 @ 08:00) (94% - 100%)  Wt(kg): --    MEDICATIONS   acetaminophen     Tablet .. 650 milliGRAM(s) every 6 hours PRN  amantadine 100 milliGRAM(s) <User Schedule>  atorvastatin 80 milliGRAM(s) at bedtime  chlorhexidine 2% Cloths 1 Application(s) <User Schedule>  diltiazem    Tablet 60 milliGRAM(s) <User Schedule>  folic acid 1 milliGRAM(s) daily  fondaparinux Injectable 1.2 milliGRAM(s) <User Schedule>  hydrALAZINE Injectable 10 milliGRAM(s) every 2 hours PRN  labetalol Injectable 10 milliGRAM(s) every 2 hours PRN  melatonin 6 milliGRAM(s) at bedtime  memantine 5 milliGRAM(s) two times a day  metoprolol tartrate 50 milliGRAM(s) <User Schedule>  Nephro-roma 1 Tablet(s) daily  polyethylene glycol 3350 17 Gram(s) daily  senna 2 Tablet(s) at bedtime  sodium chloride 0.9%. 1000 milliLiter(s) <Continuous>  tamsulosin 0.8 milliGRAM(s) at bedtime      RECENT LABS/IMAGING  - Reviewed Today                        8.8    10.31 )-----------( 286      ( 29 Nov 2023 03:40 )             27.2     11-29    140  |  107  |  36.5<H>  ----------------------------<  115<H>  4.4   |  21.0<L>  |  1.10    Ca    8.6      29 Nov 2023 03:40  Phos  2.3     11-29  Mg     1.8     11-29        Urinalysis Basic - ( 29 Nov 2023 03:40 )    Color: x / Appearance: x / SG: x / pH: x  Gluc: 115 mg/dL / Ketone: x  / Bili: x / Urobili: x   Blood: x / Protein: x / Nitrite: x   Leuk Esterase: x / RBC: x / WBC x   Sq Epi: x / Non Sq Epi: x / Bacteria: x    CT 11/23 - Stable multicompartment intracranial hemorrhages. Stable right-sided subdural collection.    CTH 11/22 - Mixed attenuating subdural hematoma again seen with some   expected postoperative changes. Evolving area of parenchymal hemorrhage involving the right frontal   region.    CTH 11/16/23: Improved to stable multicompartment intracranial hemorrhages.   Right-sided subdural hygroma, larger in size.    US duplex BLE 11/16/23: No evidence of deep venous thrombosis in either lower extremity.    CXR 11/15/23: There is mild pulmonary venous congestion. Left retrocardiac/lower lobe   opacity could represent associated pneumonia.    MR Brain 11/13/23: RIGHT frontoparietal craniectomy with underlying   intracranial hemorrhage and edema within the RIGHT frontal lobe.   Overlying small RIGHT subdural hemorrhage is also unchanged. Scant   hemorrhage in the dependent portions of the BILATERAL lateral ventricles   and minimal subarachnoid hemorrhage seen in the BILATERAL frontal and   parietal lobes. Moderate periventricular and deep white matter ischemia   is noted. Encephalomalacia and gliosis seen in the anterior frontal lobes   bilaterally. Tiny acute lacunar infarction in the LEFT cerebellum and   tiny acute cortical infarction in the RIGHT parietal lobe. Restricted   diffusion also noted about the surgical cavity suggesting infarcted   parenchyma.    CTH 11/29: IMPRESSION: Stable intracranial hemorrhages.      ----------------------------------------------------------------------------------------  PHYSICAL EXAM  Constitutional - Awake   HEENT - +R crani, gauze dressing in place, no bleeding or soaking through dressing   Chest - Breathing comfortably on NC   Cardiovascular - Tachycardia   Abdomen - Soft   Extremities - No peripheral edema   Neurologic Exam -                    Cognitive - Intermittently follows commands       Communication - Mumbles      Cranial Nerves - No facial asymmetry      Motor -                    LEFT    UE - flaccid                     RIGHT UE - moves extremity spontaneously to pain                     LEFT    LE - moves extremity spontaneously to pain                     RIGHT LE - moves extremity spontaneously to pain      ----------------------------------------------------------------------------------------  ASSESSMENT/PLAN  83yMale with functional deficits after multicompartmental intracranial bleeding.    Traumatic ICH - Stable   Acute CVA? - WHIT pending   Alzheimer's Dementia - Recommend restarting Namenda 5mg BID, Neuropsychology (Dr. Diallo) to follow    Wakefulness - Recommend increasing Amantadine to 150mg @0600   CAD, PAfib - ASA, Statin, Dilt, Metop   HTN - Hydralazine, labetalol   Fever - Merrem   Dysphagia - Pureed/mod thick   Delirium - Recommend increasing melatonin 6mg qhs   LUE DVT - Apixaban?   Dysphagia - Pureed/mod thick   DVT ppx - SCD    Rehab/Impaired mobility and function - Continuous hospitalization is crucial for managing the patient's acute medical issues (Traumatic ICH, AMS, decreased wakefulness), which have significantly impacted their mobility, quality of life, and function. Rehabilitation recommendations will be based on the patient's functional progress and their ability to participate in and tolerate therapeutic interventions, which may change over time. Maintaining ongoing mobilization by the staff is imperative to prevent secondary medical complications and associated health issues related to debility.    The patient continues to suffer from AMS with increased lethargy. Would recommend increasing Amantadine form 100mg @0600 to Amantadine 150mg @0600. Would also increase Melatonin 3mg qhs to Melatonin 6mg qhs. Recommend restarting Namenda 5mg BID.     The patient will likely benefit from HOLLY placement. PM&R will continue to follow.                  Questionable veg on WHIT   Underwent stereotactic CTH for cranioplasty   Namenda added, melatonin increased   VSS, intermittently tachycardic, afebrile     FUNCTIONAL PROGRESS  SLP 11/26   Speech Language Pathology Recommendations: 1. Minced/moist diet w/ moderately thick liquids 2. 1:1 assist for PO3. Meds crushed in puree 4. STRICT aspiration precautions 5. upright for PO, slow rate, small bites/sips, alternate solids/liquids 6. Pt must be AWAKE/ALERT FOR PO 7. D/C PO diet w/ overt s/s of penetration/aspiration pending reconsult from this department 8. SLP to follow     PT 11/29   Bed Mobility  Bed Mobility Training Sit-to-Supine: maximum assist (25% patient effort);  2 person assist;  verbal cues;  nonverbal cues (demo/gestures)  Bed Mobility Training Supine-to-Sit: maximum assist (25% patient effort);  2 person assist;  verbal cues;  nonverbal cues (demo/gestures);  +helmet on  Bed Mobility Training Limitations: decreased ROM;  decreased strength;  impaired balance;  impaired postural control;  impaired motor control;  impaired coordination    Therapeutic Exercise  Therapeutic Exercise Detail: pt dangled at edge of bed for 5 minutes with maxAx1, performed right UE/LE active ROM, worked on cervical extension fatigue noted.     OT 11/28  Bathing Training:     · Level of [place]	dependent (less than 25% patients effort)    Upper Body Dressing Training:     · Level of [place]	dependent (less than 25% patients effort)    Lower Body Dressing Training:     · Level of [place]	dependent (less than 25% patients effort)    Toilet Hygiene Training:     · Level of [place]	dependent (less than 25% patients effort)    Grooming Training:     · Level of [place]	maximum assist (25% patients effort)        VITALS  T(C): 36.8 (11-30-23 @ 08:00), Max: 37.6 (11-29-23 @ 22:00)  HR: 74 (11-30-23 @ 08:00) (72 - 104)  BP: 111/65 (11-30-23 @ 08:00) (104/61 - 152/82)  RR: 12 (11-30-23 @ 08:00) (12 - 25)  SpO2: 97% (11-30-23 @ 08:00) (94% - 100%)  Wt(kg): --    MEDICATIONS   acetaminophen     Tablet .. 650 milliGRAM(s) every 6 hours PRN  amantadine 100 milliGRAM(s) <User Schedule>  atorvastatin 80 milliGRAM(s) at bedtime  chlorhexidine 2% Cloths 1 Application(s) <User Schedule>  diltiazem    Tablet 60 milliGRAM(s) <User Schedule>  folic acid 1 milliGRAM(s) daily  fondaparinux Injectable 1.2 milliGRAM(s) <User Schedule>  hydrALAZINE Injectable 10 milliGRAM(s) every 2 hours PRN  labetalol Injectable 10 milliGRAM(s) every 2 hours PRN  melatonin 6 milliGRAM(s) at bedtime  memantine 5 milliGRAM(s) two times a day  metoprolol tartrate 50 milliGRAM(s) <User Schedule>  Nephro-roma 1 Tablet(s) daily  polyethylene glycol 3350 17 Gram(s) daily  senna 2 Tablet(s) at bedtime  sodium chloride 0.9%. 1000 milliLiter(s) <Continuous>  tamsulosin 0.8 milliGRAM(s) at bedtime      RECENT LABS/IMAGING  - Reviewed Today                        8.8    10.31 )-----------( 286      ( 29 Nov 2023 03:40 )             27.2     11-29    140  |  107  |  36.5<H>  ----------------------------<  115<H>  4.4   |  21.0<L>  |  1.10    Ca    8.6      29 Nov 2023 03:40  Phos  2.3     11-29  Mg     1.8     11-29        Urinalysis Basic - ( 29 Nov 2023 03:40 )    Color: x / Appearance: x / SG: x / pH: x  Gluc: 115 mg/dL / Ketone: x  / Bili: x / Urobili: x   Blood: x / Protein: x / Nitrite: x   Leuk Esterase: x / RBC: x / WBC x   Sq Epi: x / Non Sq Epi: x / Bacteria: x    CT 11/23 - Stable multicompartment intracranial hemorrhages. Stable right-sided subdural collection.    CTH 11/22 - Mixed attenuating subdural hematoma again seen with some   expected postoperative changes. Evolving area of parenchymal hemorrhage involving the right frontal   region.    CTH 11/16/23: Improved to stable multicompartment intracranial hemorrhages.   Right-sided subdural hygroma, larger in size.    US duplex BLE 11/16/23: No evidence of deep venous thrombosis in either lower extremity.    CXR 11/15/23: There is mild pulmonary venous congestion. Left retrocardiac/lower lobe   opacity could represent associated pneumonia.    MR Brain 11/13/23: RIGHT frontoparietal craniectomy with underlying   intracranial hemorrhage and edema within the RIGHT frontal lobe.   Overlying small RIGHT subdural hemorrhage is also unchanged. Scant   hemorrhage in the dependent portions of the BILATERAL lateral ventricles   and minimal subarachnoid hemorrhage seen in the BILATERAL frontal and   parietal lobes. Moderate periventricular and deep white matter ischemia   is noted. Encephalomalacia and gliosis seen in the anterior frontal lobes   bilaterally. Tiny acute lacunar infarction in the LEFT cerebellum and   tiny acute cortical infarction in the RIGHT parietal lobe. Restricted   diffusion also noted about the surgical cavity suggesting infarcted   parenchyma.    CTH 11/29: IMPRESSION: Stable intracranial hemorrhages.      ----------------------------------------------------------------------------------------  PHYSICAL EXAM  Constitutional - Awake   HEENT - +R crani   Chest - Breathing comfortably on NC   Cardiovascular - RRR  Abdomen - Soft   Extremities - No peripheral edema   Neurologic Exam -                    Cognitive - Intermittently follows commands       Communication - Mumbles      Cranial Nerves - No facial asymmetry      Motor -                    LEFT    UE - flaccid                     RIGHT UE - moves extremity spontaneously to pain                     LEFT    LE - moves extremity spontaneously to pain                     RIGHT LE - moves extremity spontaneously to pain      ----------------------------------------------------------------------------------------  ASSESSMENT/PLAN  83yMale with functional deficits after multicompartmental intracranial bleeding.    Traumatic ICH - Stable   Acute CVA - HWIT w/vegetations?    Alzheimer's Dementia - Namenda 5mg BID, Neuropsychology (Dr. Diallo) to follow    Wakefulness - Recommend increasing Amantadine to 150mg @0600   CAD, PAfib - Statin, Dilt, Metop   HTN - Hydralazine, labetalol   Dysphagia - Pureed/mod thick   Delirium - Melatonin 6mg qhs   LUE DVT - Fondaparinux    Dysphagia - Pureed/mod thick   DVT ppx - SCD, Fondaparinux   Rehab/Impaired mobility and function - Continuous hospitalization is crucial for managing the patient's acute medical issues (Traumatic ICH, AMS, decreased wakefulness), which have significantly impacted their mobility, quality of life, and function. Rehabilitation recommendations will be based on the patient's functional progress and their ability to participate in and tolerate therapeutic interventions, which may change over time. Maintaining ongoing mobilization by the staff is imperative to prevent secondary medical complications and associated health issues related to debility.    The patient will likely benefit from HOLLY placement given current functional status, but discharge recommendations may be subject to change given ongoing treatment. PM&R will continue to follow.

## 2023-11-30 NOTE — PROGRESS NOTE ADULT - ATTENDING COMMENTS
Patient seen and examined, discussed patient with Dr. Tovar and agree with recommendations.    Rehab/Impaired mobility and function - Patient continues to require hospitalization for the above diagnoses and ongoing active management of comorbid complications (ICU level care) that are substantially impairing functional ability and impairing quality of life necessitating ongoing medical management of these complications.      When medically optimized, based on the patient's diagnosis, current functional status, and potential for progress, expect patient will need HOLLY. Patient needs a more prolonged stay to achieve transition to community living and would not be able to tolerate a comprehensive/intense rehab program of 3hours/day.     Will continue to follow. Rehab recommendations are dependent on how functional progress changes as well as how patient continues to participate and tolerate therapeutic interventions, which may change. Recommend ongoing mobilization by staff to maintain cardiopulmonary function and prevention of secondary complications related to debility. Discussed the specific management and recommendations above with rehab clinical care team/rehab liaison.

## 2023-11-30 NOTE — PROGRESS NOTE ADULT - ASSESSMENT
83M with HTN, CAD s/p PCO, RCC s/p left nephrectomy, bladder Ca, BPH, CHF, Vertigo, HLD, AAA s/p EVAR, parox Afib, h/o HIT, Alzheimers transferred s/p fall with Rt frontal IPH. Initially had hemicraniectomy,  with post op course complicated by RVR, asp PNA, hypoxic resp failure, LUE DVT and questionable increase in AAA size. Started on AC with waxing/waning mental status prompting head imaging with evidence of new bleed. Monitored in NICU and now being transferred back to Medicine     [facility] -s/p Rt hemicraniotomy    AC for DVT/Afib with demonstration of new bleed  Continue Q2 neurochecks in step down  Close monitoring of mental status, per NICU expectation of waxing/waning mental status,  STAT CT head if mental status acutely changes   Strict BP parameters (maintain SBP < 160, avoid rapid fluctuations)  Continue Amantadine   Continue modified diet     Parox Afib/ HTN/HLD   Continue Metoprolol/diltiazem BID  Continue Statin   Labetalol / Hydralazine PRN   Continue Fondaparinux. Transition to DOAC when cleared by NSx     Questionable veg on TTE - discussed with iD- recommends WHIT  family is hesitant discussed with wife - await call back from daughter  Was being considered for WHIT per chart  f/u with Cardiology to determine if plan for WHIT     Acute DVT   Continue AC as above, avoid heparin products 2/2 h/o HIT  DOAC when cleared by NSx    Normocytic Anemia   Stools visualized. Normal color, no melena / evidence of GI blood loss  Repeat in am along with T&S,  transfuse to goal > 8 given h/o CAD    AAA s/p EVAR   Imaging reviewed by Vascular surgery  No intervention planned at present  Recommended obtaining prior imaging from pts Vascular surgeon (Dr Heredia for comparison)    DONNIE- resolved   Suspect may have CKD II  Monitor and renally dose all medications   hall maintained, TOV when more ambulatory/ mental status improved     Acute hypoxic resp failure 2/2 suspected asp PNA   Resolved   s/p completion of IV Abx   ID recs reviewed

## 2023-11-30 NOTE — PROGRESS NOTE ADULT - SUBJECTIVE AND OBJECTIVE BOX
HPI:  HPI:  82y M with PMH HTN, CAD s/p stents, renal cell carcinoma status post left nephrectomy, bladder CA, BPH, CHF, LBBB, vertigo,  HLD, 5.5cm AAA without rupture post EVAR, paroxysmal A-fib on Eliquis, Plavix, and aspirin, early stage Alzheimer dementia transferred from [facility] s/p unwitnessed fall with head strike at home found by wife on floor at 8am. Patient brought to urgent care by wife and had 5 staples to posterior scalp but was instructed to go to nearest ED.  CT head at [place] showed R frontal IPH, SDH, SAH at 9:00. CTA stroke protocol not performed at [facility]. Patient BIB on 6L NC.  Pt GCS 15 on arrival, A&Ox2 on arrival. After initial assessment, patient noted to be vomiting enroute to CT scanner.          (10 Nov 2023 11:04)      INTERVAL HPI/OVERNIGHT EVENTS:  83y Male s/p __ seen lying comfortably in bed. Tolerating diet. Passing gas/BM. Voiding. [place] in with __ clear urine. Denies headache, weakness, numbness, n/v/d, fevers, chills, chest pain, SOB.     Vital Signs Last 24 Hrs  T(C): 36.7 (30 Nov 2023 10:00), Max: 37.6 (29 Nov 2023 22:00)  T(F): 98 (30 Nov 2023 10:00), Max: 99.7 (29 Nov 2023 22:00)  HR: 83 (30 Nov 2023 12:30) (69 - 94)  BP: 152/69 (30 Nov 2023 12:30) (104/61 - 152/82)  BP(mean): 89 (30 Nov 2023 12:30) (65 - 120)  RR: 10 (30 Nov 2023 12:30) (10 - 25)  SpO2: 100% (30 Nov 2023 12:30) (94% - 100%)    Parameters below as of 30 Nov 2023 12:30  Patient On (Oxygen Delivery Method): room air        PHYSICAL EXAM:  Constitutional: NAD  Neuro  * Mental Status: some lethargy, EO to loud voice, A&O x 1 to self, following commands   * Cranial Nerves: PERRL, no gaze deviation, b/l pupil 4mm react  * Motor: RUE 4+/5, RLE 4/5, LUE/LLE withdraws   * Sensory: Right side SILT, left side sensation grossly intact to noxious stimuli  Head: incision C/D/I, flap full but soft   abd: soft, nontender, nondistended  skin: warm, dry    LABS:                        8.8    10.31 )-----------( 286      ( 29 Nov 2023 03:40 )             27.2     11-29    140  |  107  |  36.5<H>  ----------------------------<  115<H>  4.4   |  21.0<L>  |  1.10    Ca    8.6      29 Nov 2023 03:40  Phos  2.3     11-29  Mg     1.8     11-29        Urinalysis Basic - ( 29 Nov 2023 03:40 )    Color: x / Appearance: x / SG: x / pH: x  Gluc: 115 mg/dL / Ketone: x  / Bili: x / Urobili: x   Blood: x / Protein: x / Nitrite: x   Leuk Esterase: x / RBC: x / WBC x   Sq Epi: x / Non Sq Epi: x / Bacteria: x        11-29 @ 07:01 - 11-30 @ 07:00  --------------------------------------------------------  IN: 2450 mL / OUT: 2185 mL / NET: 265 mL    11-30 @ 07:01 - 11-30 @ 15:17  --------------------------------------------------------  IN: 225 mL / OUT: 500 mL / NET: -275 mL        RADIOLOGY & ADDITIONAL TESTS:  < from: CT Head No Cont (11.29.23 @ 08:58) >  IMPRESSION: Stable intracranial hemorrhages.    --- End of Report ---    < end of copied text >   HPI:  HPI:  82y M with PMH HTN, CAD s/p stents, renal cell carcinoma status post left nephrectomy, bladder CA, BPH, CHF, LBBB, vertigo,  HLD, 5.5cm AAA without rupture post EVAR, paroxysmal A-fib on Eliquis, Plavix, and aspirin, early stage Alzheimer dementia transferred from [facility] s/p unwitnessed fall with head strike at home found by wife on floor at 8am. Patient brought to urgent care by wife and had 5 staples to posterior scalp but was instructed to go to nearest ED.  CT head at [place] showed R frontal IPH, SDH, SAH at 9:00. CTA stroke protocol not performed at [facility]. Patient BIB on 6L NC.  Pt GCS 15 on arrival, A&Ox2 on arrival. After initial assessment, patient noted to be vomiting enroute to CT scanner.          (10 Nov 2023 11:04)      INTERVAL HPI/OVERNIGHT EVENTS:  Patient seen and examined at bedside. No acute complaints.    Vital Signs Last 24 Hrs  T(C): 36.7 (30 Nov 2023 10:00), Max: 37.6 (29 Nov 2023 22:00)  T(F): 98 (30 Nov 2023 10:00), Max: 99.7 (29 Nov 2023 22:00)  HR: 83 (30 Nov 2023 12:30) (69 - 94)  BP: 152/69 (30 Nov 2023 12:30) (104/61 - 152/82)  BP(mean): 89 (30 Nov 2023 12:30) (65 - 120)  RR: 10 (30 Nov 2023 12:30) (10 - 25)  SpO2: 100% (30 Nov 2023 12:30) (94% - 100%)    Parameters below as of 30 Nov 2023 12:30  Patient On (Oxygen Delivery Method): room air        PHYSICAL EXAM:  Constitutional: NAD  Neuro  * Mental Status: some lethargy, EO to loud voice, A&O x 1 to self, following commands   * Cranial Nerves: PERRL, no gaze deviation, b/l pupil 4mm react  * Motor: RUE 4+/5, RLE 4/5, LUE/LLE withdraws   * Sensory: Right side SILT, left side sensation grossly intact to noxious stimuli  Head: incision C/D/I, flap full but soft   abd: soft, nontender, nondistended  skin: warm, dry    LABS:                        8.8    10.31 )-----------( 286      ( 29 Nov 2023 03:40 )             27.2     11-29    140  |  107  |  36.5<H>  ----------------------------<  115<H>  4.4   |  21.0<L>  |  1.10    Ca    8.6      29 Nov 2023 03:40  Phos  2.3     11-29  Mg     1.8     11-29        Urinalysis Basic - ( 29 Nov 2023 03:40 )    Color: x / Appearance: x / SG: x / pH: x  Gluc: 115 mg/dL / Ketone: x  / Bili: x / Urobili: x   Blood: x / Protein: x / Nitrite: x   Leuk Esterase: x / RBC: x / WBC x   Sq Epi: x / Non Sq Epi: x / Bacteria: x        11-29 @ 07:01 - 11-30 @ 07:00  --------------------------------------------------------  IN: 2450 mL / OUT: 2185 mL / NET: 265 mL    11-30 @ 07:01 - 11-30 @ 15:17  --------------------------------------------------------  IN: 225 mL / OUT: 500 mL / NET: -275 mL        RADIOLOGY & ADDITIONAL TESTS:  < from: CT Head No Cont (11.29.23 @ 08:58) >  IMPRESSION: Stable intracranial hemorrhages.    --- End of Report ---    < end of copied text >

## 2023-12-01 LAB
ALBUMIN SERPL ELPH-MCNC: 3.1 G/DL — LOW (ref 3.3–5.2)
ALBUMIN SERPL ELPH-MCNC: 3.1 G/DL — LOW (ref 3.3–5.2)
ALP SERPL-CCNC: 95 U/L — SIGNIFICANT CHANGE UP (ref 40–120)
ALP SERPL-CCNC: 95 U/L — SIGNIFICANT CHANGE UP (ref 40–120)
ALT FLD-CCNC: 14 U/L — SIGNIFICANT CHANGE UP
ALT FLD-CCNC: 14 U/L — SIGNIFICANT CHANGE UP
ANION GAP SERPL CALC-SCNC: 10 MMOL/L — SIGNIFICANT CHANGE UP (ref 5–17)
ANION GAP SERPL CALC-SCNC: 10 MMOL/L — SIGNIFICANT CHANGE UP (ref 5–17)
AST SERPL-CCNC: 16 U/L — SIGNIFICANT CHANGE UP
AST SERPL-CCNC: 16 U/L — SIGNIFICANT CHANGE UP
BILIRUB SERPL-MCNC: 0.7 MG/DL — SIGNIFICANT CHANGE UP (ref 0.4–2)
BILIRUB SERPL-MCNC: 0.7 MG/DL — SIGNIFICANT CHANGE UP (ref 0.4–2)
BUN SERPL-MCNC: 33.2 MG/DL — HIGH (ref 8–20)
BUN SERPL-MCNC: 33.2 MG/DL — HIGH (ref 8–20)
CALCIUM SERPL-MCNC: 8.9 MG/DL — SIGNIFICANT CHANGE UP (ref 8.4–10.5)
CALCIUM SERPL-MCNC: 8.9 MG/DL — SIGNIFICANT CHANGE UP (ref 8.4–10.5)
CHLORIDE SERPL-SCNC: 99 MMOL/L — SIGNIFICANT CHANGE UP (ref 96–108)
CHLORIDE SERPL-SCNC: 99 MMOL/L — SIGNIFICANT CHANGE UP (ref 96–108)
CO2 SERPL-SCNC: 24 MMOL/L — SIGNIFICANT CHANGE UP (ref 22–29)
CO2 SERPL-SCNC: 24 MMOL/L — SIGNIFICANT CHANGE UP (ref 22–29)
CREAT SERPL-MCNC: 1.12 MG/DL — SIGNIFICANT CHANGE UP (ref 0.5–1.3)
CREAT SERPL-MCNC: 1.12 MG/DL — SIGNIFICANT CHANGE UP (ref 0.5–1.3)
EGFR: 66 ML/MIN/1.73M2 — SIGNIFICANT CHANGE UP
EGFR: 66 ML/MIN/1.73M2 — SIGNIFICANT CHANGE UP
GLUCOSE SERPL-MCNC: 114 MG/DL — HIGH (ref 70–99)
GLUCOSE SERPL-MCNC: 114 MG/DL — HIGH (ref 70–99)
HCT VFR BLD CALC: 24.6 % — LOW (ref 39–50)
HCT VFR BLD CALC: 24.6 % — LOW (ref 39–50)
HGB BLD-MCNC: 8.3 G/DL — LOW (ref 13–17)
HGB BLD-MCNC: 8.3 G/DL — LOW (ref 13–17)
MAGNESIUM SERPL-MCNC: 1.8 MG/DL — SIGNIFICANT CHANGE UP (ref 1.6–2.6)
MAGNESIUM SERPL-MCNC: 1.8 MG/DL — SIGNIFICANT CHANGE UP (ref 1.6–2.6)
MCHC RBC-ENTMCNC: 31.3 PG — SIGNIFICANT CHANGE UP (ref 27–34)
MCHC RBC-ENTMCNC: 31.3 PG — SIGNIFICANT CHANGE UP (ref 27–34)
MCHC RBC-ENTMCNC: 33.7 GM/DL — SIGNIFICANT CHANGE UP (ref 32–36)
MCHC RBC-ENTMCNC: 33.7 GM/DL — SIGNIFICANT CHANGE UP (ref 32–36)
MCV RBC AUTO: 92.8 FL — SIGNIFICANT CHANGE UP (ref 80–100)
MCV RBC AUTO: 92.8 FL — SIGNIFICANT CHANGE UP (ref 80–100)
PHOSPHATE SERPL-MCNC: 2.8 MG/DL — SIGNIFICANT CHANGE UP (ref 2.4–4.7)
PHOSPHATE SERPL-MCNC: 2.8 MG/DL — SIGNIFICANT CHANGE UP (ref 2.4–4.7)
PLATELET # BLD AUTO: 296 K/UL — SIGNIFICANT CHANGE UP (ref 150–400)
PLATELET # BLD AUTO: 296 K/UL — SIGNIFICANT CHANGE UP (ref 150–400)
POTASSIUM SERPL-MCNC: 4.4 MMOL/L — SIGNIFICANT CHANGE UP (ref 3.5–5.3)
POTASSIUM SERPL-MCNC: 4.4 MMOL/L — SIGNIFICANT CHANGE UP (ref 3.5–5.3)
POTASSIUM SERPL-SCNC: 4.4 MMOL/L — SIGNIFICANT CHANGE UP (ref 3.5–5.3)
POTASSIUM SERPL-SCNC: 4.4 MMOL/L — SIGNIFICANT CHANGE UP (ref 3.5–5.3)
PROT SERPL-MCNC: 6.3 G/DL — LOW (ref 6.6–8.7)
PROT SERPL-MCNC: 6.3 G/DL — LOW (ref 6.6–8.7)
RBC # BLD: 2.65 M/UL — LOW (ref 4.2–5.8)
RBC # BLD: 2.65 M/UL — LOW (ref 4.2–5.8)
RBC # FLD: 12.9 % — SIGNIFICANT CHANGE UP (ref 10.3–14.5)
RBC # FLD: 12.9 % — SIGNIFICANT CHANGE UP (ref 10.3–14.5)
SODIUM SERPL-SCNC: 133 MMOL/L — LOW (ref 135–145)
SODIUM SERPL-SCNC: 133 MMOL/L — LOW (ref 135–145)
WBC # BLD: 8.53 K/UL — SIGNIFICANT CHANGE UP (ref 3.8–10.5)
WBC # BLD: 8.53 K/UL — SIGNIFICANT CHANGE UP (ref 3.8–10.5)
WBC # FLD AUTO: 8.53 K/UL — SIGNIFICANT CHANGE UP (ref 3.8–10.5)
WBC # FLD AUTO: 8.53 K/UL — SIGNIFICANT CHANGE UP (ref 3.8–10.5)

## 2023-12-01 PROCEDURE — 99233 SBSQ HOSP IP/OBS HIGH 50: CPT | Mod: GC

## 2023-12-01 PROCEDURE — 99233 SBSQ HOSP IP/OBS HIGH 50: CPT

## 2023-12-01 RX ORDER — APIXABAN 2.5 MG/1
5 TABLET, FILM COATED ORAL EVERY 12 HOURS
Refills: 0 | Status: DISCONTINUED | OUTPATIENT
Start: 2023-12-01 | End: 2023-12-01

## 2023-12-01 RX ORDER — MODAFINIL 200 MG/1
100 TABLET ORAL DAILY
Refills: 0 | Status: DISCONTINUED | OUTPATIENT
Start: 2023-12-02 | End: 2023-12-07

## 2023-12-01 RX ORDER — QUETIAPINE FUMARATE 200 MG/1
12.5 TABLET, FILM COATED ORAL AT BEDTIME
Refills: 0 | Status: DISCONTINUED | OUTPATIENT
Start: 2023-12-01 | End: 2023-12-07

## 2023-12-01 RX ORDER — APIXABAN 2.5 MG/1
5 TABLET, FILM COATED ORAL EVERY 12 HOURS
Refills: 0 | Status: DISCONTINUED | OUTPATIENT
Start: 2023-12-01 | End: 2023-12-04

## 2023-12-01 RX ADMIN — Medication 650 MILLIGRAM(S): at 01:00

## 2023-12-01 RX ADMIN — APIXABAN 5 MILLIGRAM(S): 2.5 TABLET, FILM COATED ORAL at 18:06

## 2023-12-01 RX ADMIN — MEMANTINE HYDROCHLORIDE 5 MILLIGRAM(S): 10 TABLET ORAL at 18:05

## 2023-12-01 RX ADMIN — Medication 1 MILLIGRAM(S): at 12:36

## 2023-12-01 RX ADMIN — TAMSULOSIN HYDROCHLORIDE 0.8 MILLIGRAM(S): 0.4 CAPSULE ORAL at 21:55

## 2023-12-01 RX ADMIN — Medication 650 MILLIGRAM(S): at 00:43

## 2023-12-01 RX ADMIN — SENNA PLUS 2 TABLET(S): 8.6 TABLET ORAL at 21:55

## 2023-12-01 RX ADMIN — CHLORHEXIDINE GLUCONATE 1 APPLICATION(S): 213 SOLUTION TOPICAL at 06:42

## 2023-12-01 RX ADMIN — ATORVASTATIN CALCIUM 80 MILLIGRAM(S): 80 TABLET, FILM COATED ORAL at 21:55

## 2023-12-01 RX ADMIN — Medication 60 MILLIGRAM(S): at 18:06

## 2023-12-01 RX ADMIN — Medication 150 MILLIGRAM(S): at 06:36

## 2023-12-01 RX ADMIN — POLYETHYLENE GLYCOL 3350 17 GRAM(S): 17 POWDER, FOR SOLUTION ORAL at 12:36

## 2023-12-01 RX ADMIN — Medication 6 MILLIGRAM(S): at 21:55

## 2023-12-01 RX ADMIN — MEMANTINE HYDROCHLORIDE 5 MILLIGRAM(S): 10 TABLET ORAL at 06:36

## 2023-12-01 RX ADMIN — Medication 1 TABLET(S): at 12:36

## 2023-12-01 RX ADMIN — Medication 50 MILLIGRAM(S): at 00:42

## 2023-12-01 RX ADMIN — Medication 50 MILLIGRAM(S): at 12:36

## 2023-12-01 RX ADMIN — Medication 60 MILLIGRAM(S): at 06:37

## 2023-12-01 NOTE — PROGRESS NOTE ADULT - ASSESSMENT
83M with HTN, CAD s/p PCO, RCC s/p left nephrectomy, bladder Ca, BPH, CHF, Vertigo, HLD, AAA s/p EVAR, parox Afib, h/o HIT, Alzheimers transferred s/p fall with Rt frontal IP. Initially had hemicraniectomy,  with post op course complicated by RVR, asp PNA, hypoxic resp failure, LUE DVT and questionable increase in AAA size. Started on AC with waxing/waning mental status prompting head imaging with evidence of new bleed. Monitored in NICU and now being transferred back to Medicine     [facility] -s/p Rt hemicraniotomy    AC for DVT/Afib with demonstration of new bleed  Continue Q2 neurochecks in step down  Close monitoring of mental status, per NICU expectation of waxing/waning mental status,  STAT CT head if mental status acutely changes   Strict BP parameters (maintain SBP < 160, avoid rapid fluctuations)  Continue Amantadine --> provigil   Continue modified diet     Parox Afib/ HTN/HLD   Continue Metoprolol/diltiazem BID  Continue Statin   Labetalol / Hydralazine PRN   Continue Fondaparinux --> eliquis until 12/4 per NSx       Questionable veg on TTE - discussed with iD- recommends WHIT  family is hesitant discussed with wife - await call back from daughter  Was being considered for WHIT per chart  f/u with Cardiology to determine if plan for WHIT     Acute DVT   Continue AC as above, avoid heparin products 2/2 h/o HIT    Normocytic Anemia   Stools visualized. Normal color, no melena / evidence of GI blood loss  Repeat in am along with T&S,  transfuse to goal > 8 given h/o CAD    AAA s/p EVAR   Imaging reviewed by Vascular surgery  No intervention planned at present  Recommended obtaining prior imaging from pts Vascular surgeon (Dr Heredia for comparison)    DONNIE- resolved   Suspect may have CKD II  Monitor and renally dose all medications   hall maintained, TOV when more ambulatory/ mental status improved     Acute hypoxic resp failure 2/2 suspected asp PNA   Resolved   s/p completion of IV Abx   ID recs reviewed

## 2023-12-01 NOTE — PROGRESS NOTE ADULT - SUBJECTIVE AND OBJECTIVE BOX
Lethargic, intermittently opens eyes to voice   As per nursing, much more interactive yesterday during the afternoon   Cranioplasty planning for next week   VSS, afebrile     FUNCTIONAL PROGRESS  SLP 11/26   Speech Language Pathology Recommendations: 1. Minced/moist diet w/ moderately thick liquids 2. 1:1 assist for PO3. Meds crushed in puree 4. STRICT aspiration precautions 5. upright for PO, slow rate, small bites/sips, alternate solids/liquids 6. Pt must be AWAKE/ALERT FOR PO 7. D/C PO diet w/ overt s/s of penetration/aspiration pending reconsult from this department 8. SLP to follow     PT 11/29   Bed Mobility  Bed Mobility Training Sit-to-Supine: maximum assist (25% patient effort);  2 person assist;  verbal cues;  nonverbal cues (demo/gestures)  Bed Mobility Training Supine-to-Sit: maximum assist (25% patient effort);  2 person assist;  verbal cues;  nonverbal cues (demo/gestures);  +helmet on  Bed Mobility Training Limitations: decreased ROM;  decreased strength;  impaired balance;  impaired postural control;  impaired motor control;  impaired coordination    Therapeutic Exercise  Therapeutic Exercise Detail: pt dangled at edge of bed for 5 minutes with maxAx1, performed right UE/LE active ROM, worked on cervical extension fatigue noted.     OT 11/28  Bathing Training:     · Level of [place]	dependent (less than 25% patients effort)    Upper Body Dressing Training:     · Level of [place]	dependent (less than 25% patients effort)    Lower Body Dressing Training:     · Level of [place]	dependent (less than 25% patients effort)    Toilet Hygiene Training:     · Level of [place]	dependent (less than 25% patients effort)    Grooming Training:     · Level of [place]	maximum assist (25% patients effort)        VITALS  T(C): 36.9 (12-01-23 @ 08:30), Max: 37.5 (12-01-23 @ 00:02)  HR: 83 (12-01-23 @ 10:00) (70 - 100)  BP: 132/62 (12-01-23 @ 10:00) (104/70 - 152/86)  RR: 19 (12-01-23 @ 10:00) (10 - 20)  SpO2: 98% (12-01-23 @ 10:00) (95% - 100%)  Wt(kg): --    MEDICATIONS   acetaminophen     Tablet .. 650 milliGRAM(s) every 6 hours PRN  apixaban 5 milliGRAM(s) every 12 hours  atorvastatin 80 milliGRAM(s) at bedtime  chlorhexidine 2% Cloths 1 Application(s) <User Schedule>  diltiazem    Tablet 60 milliGRAM(s) <User Schedule>  folic acid 1 milliGRAM(s) daily  hydrALAZINE Injectable 10 milliGRAM(s) every 2 hours PRN  labetalol Injectable 10 milliGRAM(s) every 2 hours PRN  melatonin 6 milliGRAM(s) at bedtime  memantine 5 milliGRAM(s) two times a day  metoprolol tartrate 50 milliGRAM(s) <User Schedule>  Nephro-roma 1 Tablet(s) daily  polyethylene glycol 3350 17 Gram(s) daily  senna 2 Tablet(s) at bedtime  tamsulosin 0.8 milliGRAM(s) at bedtime      RECENT LABS/IMAGING  - Reviewed Today                        8.3    8.53  )-----------( 296      ( 01 Dec 2023 05:13 )             24.6     12-01    133<L>  |  99  |  33.2<H>  ----------------------------<  114<H>  4.4   |  24.0  |  1.12    Ca    8.9      01 Dec 2023 05:13  Phos  2.8     12-01  Mg     1.8     12-01    TPro  6.3<L>  /  Alb  3.1<L>  /  TBili  0.7  /  DBili  x   /  AST  16  /  ALT  14  /  AlkPhos  95  12-01      Urinalysis Basic - ( 01 Dec 2023 05:13 )    Color: x / Appearance: x / SG: x / pH: x  Gluc: 114 mg/dL / Ketone: x  / Bili: x / Urobili: x   Blood: x / Protein: x / Nitrite: x   Leuk Esterase: x / RBC: x / WBC x   Sq Epi: x / Non Sq Epi: x / Bacteria: x      CT 11/23 - Stable multicompartment intracranial hemorrhages. Stable right-sided subdural collection.    CTH 11/22 - Mixed attenuating subdural hematoma again seen with some   expected postoperative changes. Evolving area of parenchymal hemorrhage involving the right frontal   region.    CTH 11/16/23: Improved to stable multicompartment intracranial hemorrhages.   Right-sided subdural hygroma, larger in size.    US duplex BLE 11/16/23: No evidence of deep venous thrombosis in either lower extremity.    CXR 11/15/23: There is mild pulmonary venous congestion. Left retrocardiac/lower lobe   opacity could represent associated pneumonia.    MR Brain 11/13/23: RIGHT frontoparietal craniectomy with underlying   intracranial hemorrhage and edema within the RIGHT frontal lobe.   Overlying small RIGHT subdural hemorrhage is also unchanged. Scant   hemorrhage in the dependent portions of the BILATERAL lateral ventricles   and minimal subarachnoid hemorrhage seen in the BILATERAL frontal and   parietal lobes. Moderate periventricular and deep white matter ischemia   is noted. Encephalomalacia and gliosis seen in the anterior frontal lobes   bilaterally. Tiny acute lacunar infarction in the LEFT cerebellum and   tiny acute cortical infarction in the RIGHT parietal lobe. Restricted   diffusion also noted about the surgical cavity suggesting infarcted   parenchyma.    CTH 11/29: IMPRESSION: Stable intracranial hemorrhages.      ----------------------------------------------------------------------------------------  PHYSICAL EXAM  Constitutional - Awake   HEENT - +R crani   Chest - Breathing comfortably on NC   Cardiovascular - RRR  Abdomen - Soft   Extremities - No peripheral edema   Neurologic Exam -                    Cognitive - Intermittently follows commands       Communication - Mumbles      Cranial Nerves - No facial asymmetry      Motor -                    LEFT    UE - flaccid                     RIGHT UE - moves extremity spontaneously to pain                     LEFT    LE - moves extremity spontaneously to pain                     RIGHT LE - moves extremity spontaneously to pain      ----------------------------------------------------------------------------------------  ASSESSMENT/PLAN  83yMale with functional deficits after multicompartmental intracranial bleeding.    Traumatic ICH - Stable   Acute CVA - WHIT w/vegetations?    Alzheimer's Dementia - Namenda 5mg BID, Neuropsychology (Dr. Diallo) to follow    Wakefulness - Discontinued Amantadine 150mg daily, Started Provigil 100mg @0600   CAD, PAfib - Statin, Dilt, Metop   HTN - Hydralazine, labetalol   Dysphagia - Pureed/mod thick   Delirium - Melatonin 6mg qhs, Started Seroquel 12.5mg qhs PRN   LUE DVT - Apixaban   Dysphagia - Pureed/mod thick   DVT ppx - SCD, Apixaban   Rehab/Impaired mobility and function - Continuous hospitalization is crucial for managing the patient's acute medical issues (Traumatic ICH, delirium), which have significantly impacted their mobility, quality of life, and function. Rehabilitation recommendations will be based on the patient's functional progress and their ability to participate in and tolerate therapeutic interventions, which may change over time. Maintaining ongoing mobilization by the staff is imperative to prevent secondary medical complications and associated health issues related to debility.    On discussion with Neuropsychology and extensive chart review, the patient has a history of vascular dementia and questionable cognitive deficits prior to hospitalization. In addition, he is displaying increased confusion and sleep/wake cycle disorders due to prolonged hospital stay and ICU delirium. Given this, recommend discontinuing Amantadine 150mg @0600 and starting Provigil 100mg @0600. If delirium worsens during the evening, suggest using Seroquel 12.5mg qhs PRN to help regulate symptoms.     At this point in time, the patient will most likely benefit from [facility] placement given functional status. He is currently not a candidate for ACUTE inpatient rehabilitation and cannot tolerate 3h of PT/OT/SLP daily. PM&R will continue to follow.                  Lethargic, intermittently opens eyes to voice   As per nursing, much more interactive yesterday during the afternoon   Cranioplasty planning for next week   VSS, afebrile     FUNCTIONAL PROGRESS  SLP 11/26   Speech Language Pathology Recommendations: 1. Minced/moist diet w/ moderately thick liquids 2. 1:1 assist for PO3. Meds crushed in puree 4. STRICT aspiration precautions 5. upright for PO, slow rate, small bites/sips, alternate solids/liquids 6. Pt must be AWAKE/ALERT FOR PO 7. D/C PO diet w/ overt s/s of penetration/aspiration pending reconsult from this department 8. SLP to follow     PT 11/29   Bed Mobility  Bed Mobility Training Sit-to-Supine: maximum assist (25% patient effort);  2 person assist;  verbal cues;  nonverbal cues (demo/gestures)  Bed Mobility Training Supine-to-Sit: maximum assist (25% patient effort);  2 person assist;  verbal cues;  nonverbal cues (demo/gestures);  +helmet on  Bed Mobility Training Limitations: decreased ROM;  decreased strength;  impaired balance;  impaired postural control;  impaired motor control;  impaired coordination    Therapeutic Exercise  Therapeutic Exercise Detail: pt dangled at edge of bed for 5 minutes with maxAx1, performed right UE/LE active ROM, worked on cervical extension fatigue noted.     OT 11/28  Bathing Training:     · Level of [place]	dependent (less than 25% patients effort)    Upper Body Dressing Training:     · Level of [place]	dependent (less than 25% patients effort)    Lower Body Dressing Training:     · Level of [place]	dependent (less than 25% patients effort)    Toilet Hygiene Training:     · Level of [place]	dependent (less than 25% patients effort)    Grooming Training:     · Level of [place]	maximum assist (25% patients effort)        VITALS  T(C): 36.9 (12-01-23 @ 08:30), Max: 37.5 (12-01-23 @ 00:02)  HR: 83 (12-01-23 @ 10:00) (70 - 100)  BP: 132/62 (12-01-23 @ 10:00) (104/70 - 152/86)  RR: 19 (12-01-23 @ 10:00) (10 - 20)  SpO2: 98% (12-01-23 @ 10:00) (95% - 100%)  Wt(kg): --    MEDICATIONS   acetaminophen     Tablet .. 650 milliGRAM(s) every 6 hours PRN  apixaban 5 milliGRAM(s) every 12 hours  atorvastatin 80 milliGRAM(s) at bedtime  chlorhexidine 2% Cloths 1 Application(s) <User Schedule>  diltiazem    Tablet 60 milliGRAM(s) <User Schedule>  folic acid 1 milliGRAM(s) daily  hydrALAZINE Injectable 10 milliGRAM(s) every 2 hours PRN  labetalol Injectable 10 milliGRAM(s) every 2 hours PRN  melatonin 6 milliGRAM(s) at bedtime  memantine 5 milliGRAM(s) two times a day  metoprolol tartrate 50 milliGRAM(s) <User Schedule>  Nephro-roma 1 Tablet(s) daily  polyethylene glycol 3350 17 Gram(s) daily  senna 2 Tablet(s) at bedtime  tamsulosin 0.8 milliGRAM(s) at bedtime      RECENT LABS/IMAGING  - Reviewed Today                        8.3    8.53  )-----------( 296      ( 01 Dec 2023 05:13 )             24.6     12-01    133<L>  |  99  |  33.2<H>  ----------------------------<  114<H>  4.4   |  24.0  |  1.12    Ca    8.9      01 Dec 2023 05:13  Phos  2.8     12-01  Mg     1.8     12-01    TPro  6.3<L>  /  Alb  3.1<L>  /  TBili  0.7  /  DBili  x   /  AST  16  /  ALT  14  /  AlkPhos  95  12-01      Urinalysis Basic - ( 01 Dec 2023 05:13 )    Color: x / Appearance: x / SG: x / pH: x  Gluc: 114 mg/dL / Ketone: x  / Bili: x / Urobili: x   Blood: x / Protein: x / Nitrite: x   Leuk Esterase: x / RBC: x / WBC x   Sq Epi: x / Non Sq Epi: x / Bacteria: x      CT 11/23 - Stable multicompartment intracranial hemorrhages. Stable right-sided subdural collection.    CTH 11/22 - Mixed attenuating subdural hematoma again seen with some   expected postoperative changes. Evolving area of parenchymal hemorrhage involving the right frontal   region.    CTH 11/16/23: Improved to stable multicompartment intracranial hemorrhages.   Right-sided subdural hygroma, larger in size.    US duplex BLE 11/16/23: No evidence of deep venous thrombosis in either lower extremity.    CXR 11/15/23: There is mild pulmonary venous congestion. Left retrocardiac/lower lobe   opacity could represent associated pneumonia.    MR Brain 11/13/23: RIGHT frontoparietal craniectomy with underlying   intracranial hemorrhage and edema within the RIGHT frontal lobe.   Overlying small RIGHT subdural hemorrhage is also unchanged. Scant   hemorrhage in the dependent portions of the BILATERAL lateral ventricles   and minimal subarachnoid hemorrhage seen in the BILATERAL frontal and   parietal lobes. Moderate periventricular and deep white matter ischemia   is noted. Encephalomalacia and gliosis seen in the anterior frontal lobes   bilaterally. Tiny acute lacunar infarction in the LEFT cerebellum and   tiny acute cortical infarction in the RIGHT parietal lobe. Restricted   diffusion also noted about the surgical cavity suggesting infarcted   parenchyma.    CTH 11/29: IMPRESSION: Stable intracranial hemorrhages.      ----------------------------------------------------------------------------------------  PHYSICAL EXAM  Constitutional - Awake   HEENT - +R crani   Chest - Breathing comfortably on NC   Cardiovascular - RRR  Abdomen - Soft   Extremities - No peripheral edema   Neurologic Exam -                    Cognitive - Intermittently follows commands       Communication - Mumbles      Cranial Nerves - No facial asymmetry      Motor -                    LEFT    UE - flaccid                     RIGHT UE - moves extremity spontaneously to pain                     LEFT    LE - moves extremity spontaneously to pain                     RIGHT LE - moves extremity spontaneously to pain      ----------------------------------------------------------------------------------------  ASSESSMENT/PLAN  83yMale with functional deficits after multicompartmental intracranial bleeding.    Traumatic ICH - Stable   Acute CVA - WHIT w/vegetations?    Alzheimer's Dementia - Namenda 5mg BID, Neuropsychology (Dr. Diallo) to follow    Wakefulness - Discontinued Amantadine 150mg daily, Started Provigil 100mg @0600   CAD, PAfib - Statin, Dilt, Metop   HTN - Hydralazine, labetalol   Dysphagia - Pureed/mod thick   Delirium - Melatonin 6mg qhs, Started Seroquel 12.5mg qhs PRN   LUE DVT - Apixaban   Dysphagia - Pureed/mod thick   DVT ppx - SCD, Apixaban   Rehab/Impaired mobility and function - Continuous hospitalization is crucial for managing the patient's acute medical issues (Traumatic ICH, delirium), which have significantly impacted their mobility, quality of life, and function. Rehabilitation recommendations will be based on the patient's functional progress and their ability to participate in and tolerate therapeutic interventions, which may change over time. Maintaining ongoing mobilization by the staff is imperative to prevent secondary medical complications and associated health issues related to debility.    On discussion with Neuropsychology and extensive chart review, the patient has a history of vascular dementia and questionable cognitive deficits prior to hospitalization. In addition, he is displaying increased confusion and sleep/wake cycle disorders due to prolonged hospital stay and ICU delirium. Given this, recommend discontinuing Amantadine 150mg @0600 and starting Provigil 100mg @0600. If delirium worsens during the evening, suggest using Seroquel 12.5mg qhs PRN to help regulate symptoms.     At this point in time, the patient will most likely benefit from [facility] placement given functional status. He is currently not a candidate for ACUTE inpatient rehabilitation and cannot tolerate 3h of PT/OT/SLP daily. PM&R will continue to follow.                  Lethargic, intermittently opens eyes to voice   As per nursing, much more interactive yesterday during the afternoon   Cranioplasty planning for next week   VSS, afebrile     FUNCTIONAL PROGRESS  SLP 11/26   Speech Language Pathology Recommendations: 1. Minced/moist diet w/ moderately thick liquids 2. 1:1 assist for PO3. Meds crushed in puree 4. STRICT aspiration precautions 5. upright for PO, slow rate, small bites/sips, alternate solids/liquids 6. Pt must be AWAKE/ALERT FOR PO 7. D/C PO diet w/ overt s/s of penetration/aspiration pending reconsult from this department 8. SLP to follow     PT 11/29   Bed Mobility  Bed Mobility Training Sit-to-Supine: maximum assist (25% patient effort);  2 person assist;  verbal cues;  nonverbal cues (demo/gestures)  Bed Mobility Training Supine-to-Sit: maximum assist (25% patient effort);  2 person assist;  verbal cues;  nonverbal cues (demo/gestures);  +helmet on  Bed Mobility Training Limitations: decreased ROM;  decreased strength;  impaired balance;  impaired postural control;  impaired motor control;  impaired coordination    Therapeutic Exercise  Therapeutic Exercise Detail: pt dangled at edge of bed for 5 minutes with maxAx1, performed right UE/LE active ROM, worked on cervical extension fatigue noted.     OT 11/28  Bathing Training:     · Level of [place]	dependent (less than 25% patients effort)    Upper Body Dressing Training:     · Level of [place]	dependent (less than 25% patients effort)    Lower Body Dressing Training:     · Level of [place]	dependent (less than 25% patients effort)    Toilet Hygiene Training:     · Level of [place]	dependent (less than 25% patients effort)    Grooming Training:     · Level of [place]	maximum assist (25% patients effort)        VITALS  T(C): 36.9 (12-01-23 @ 08:30), Max: 37.5 (12-01-23 @ 00:02)  HR: 83 (12-01-23 @ 10:00) (70 - 100)  BP: 132/62 (12-01-23 @ 10:00) (104/70 - 152/86)  RR: 19 (12-01-23 @ 10:00) (10 - 20)  SpO2: 98% (12-01-23 @ 10:00) (95% - 100%)  Wt(kg): --    MEDICATIONS   acetaminophen     Tablet .. 650 milliGRAM(s) every 6 hours PRN  apixaban 5 milliGRAM(s) every 12 hours  atorvastatin 80 milliGRAM(s) at bedtime  chlorhexidine 2% Cloths 1 Application(s) <User Schedule>  diltiazem    Tablet 60 milliGRAM(s) <User Schedule>  folic acid 1 milliGRAM(s) daily  hydrALAZINE Injectable 10 milliGRAM(s) every 2 hours PRN  labetalol Injectable 10 milliGRAM(s) every 2 hours PRN  melatonin 6 milliGRAM(s) at bedtime  memantine 5 milliGRAM(s) two times a day  metoprolol tartrate 50 milliGRAM(s) <User Schedule>  Nephro-roma 1 Tablet(s) daily  polyethylene glycol 3350 17 Gram(s) daily  senna 2 Tablet(s) at bedtime  tamsulosin 0.8 milliGRAM(s) at bedtime      RECENT LABS/IMAGING  - Reviewed Today                        8.3    8.53  )-----------( 296      ( 01 Dec 2023 05:13 )             24.6     12-01    133<L>  |  99  |  33.2<H>  ----------------------------<  114<H>  4.4   |  24.0  |  1.12    Ca    8.9      01 Dec 2023 05:13  Phos  2.8     12-01  Mg     1.8     12-01    TPro  6.3<L>  /  Alb  3.1<L>  /  TBili  0.7  /  DBili  x   /  AST  16  /  ALT  14  /  AlkPhos  95  12-01      Urinalysis Basic - ( 01 Dec 2023 05:13 )    Color: x / Appearance: x / SG: x / pH: x  Gluc: 114 mg/dL / Ketone: x  / Bili: x / Urobili: x   Blood: x / Protein: x / Nitrite: x   Leuk Esterase: x / RBC: x / WBC x   Sq Epi: x / Non Sq Epi: x / Bacteria: x      CT 11/23 - Stable multicompartment intracranial hemorrhages. Stable right-sided subdural collection.    CTH 11/22 - Mixed attenuating subdural hematoma again seen with some   expected postoperative changes. Evolving area of parenchymal hemorrhage involving the right frontal   region.    CTH 11/16/23: Improved to stable multicompartment intracranial hemorrhages.   Right-sided subdural hygroma, larger in size.    US duplex BLE 11/16/23: No evidence of deep venous thrombosis in either lower extremity.    CXR 11/15/23: There is mild pulmonary venous congestion. Left retrocardiac/lower lobe   opacity could represent associated pneumonia.    MR Brain 11/13/23: RIGHT frontoparietal craniectomy with underlying   intracranial hemorrhage and edema within the RIGHT frontal lobe.   Overlying small RIGHT subdural hemorrhage is also unchanged. Scant   hemorrhage in the dependent portions of the BILATERAL lateral ventricles   and minimal subarachnoid hemorrhage seen in the BILATERAL frontal and   parietal lobes. Moderate periventricular and deep white matter ischemia   is noted. Encephalomalacia and gliosis seen in the anterior frontal lobes   bilaterally. Tiny acute lacunar infarction in the LEFT cerebellum and   tiny acute cortical infarction in the RIGHT parietal lobe. Restricted   diffusion also noted about the surgical cavity suggesting infarcted   parenchyma.    CTH 11/29: IMPRESSION: Stable intracranial hemorrhages.      ----------------------------------------------------------------------------------------  PHYSICAL EXAM  Constitutional - Awake   HEENT - +R crani   Chest - Breathing comfortably on NC   Cardiovascular - RRR  Abdomen - Soft   Extremities - No peripheral edema   Neurologic Exam -                    Cognitive - Intermittently follows commands       Communication - Mumbles      Cranial Nerves - No facial asymmetry      Motor -                    LEFT    UE - flaccid                     RIGHT UE - moves extremity spontaneously to pain                     LEFT    LE - moves extremity spontaneously to pain                     RIGHT LE - moves extremity spontaneously to pain      ----------------------------------------------------------------------------------------  ASSESSMENT/PLAN  83yMale with functional deficits after multicompartmental intracranial bleeding.    Traumatic ICH - Stable   Acute CVA - WHIT w/vegetations?    Alzheimer's Dementia - Namenda 5mg BID, Neuropsychology (Dr. Diallo) to follow    Wakefulness - Discontinued Amantadine 150mg daily, Started Provigil 100mg @0600   CAD, PAfib - Statin, Dilt, Metop   HTN - Hydralazine, labetalol   Dysphagia - Pureed/mod thick   Delirium - Melatonin 6mg qhs, Started Seroquel 12.5mg qhs PRN   LUE DVT - Apixaban   Dysphagia - Pureed/mod thick   DVT ppx - SCD, Apixaban   Rehab/Impaired mobility and function - Continuous hospitalization is crucial for managing the patient's acute medical issues (Traumatic ICH, delirium), which have significantly impacted their mobility, quality of life, and function. Rehabilitation recommendations will be based on the patient's functional progress and their ability to participate in and tolerate therapeutic interventions, which may change over time. Maintaining ongoing mobilization by the staff is imperative to prevent secondary medical complications and associated health issues related to debility.    On discussion with Neuropsychology and extensive chart review, the patient has a history of vascular dementia and questionable cognitive deficits prior to hospitalization. In addition, he is displaying increased confusion and sleep/wake cycle disorders due to prolonged hospital stay and ICU delirium. Given this, recommend discontinuing Amantadine 150mg @0600 and starting Provigil 100mg @0600. If delirium worsens during the evening, suggest using Seroquel 12.5mg qhs PRN to help regulate symptoms.     At this point in time, the patient will most likely benefit from [facility] placement given functional status. He is currently not a candidate for ACUTE inpatient rehabilitation and cannot tolerate 3h of PT/OT/SLP daily. PM&R will continue to follow.

## 2023-12-01 NOTE — PROGRESS NOTE ADULT - SUBJECTIVE AND OBJECTIVE BOX
HPI:  82y M with PMH HTN, CAD s/p stents, renal cell carcinoma status post left nephrectomy, bladder CA, BPH, CHF, LBBB, vertigo,  HLD, 5.5cm AAA without rupture post EVAR, paroxysmal A-fib on Eliquis, Plavix, and aspirin, early stage Alzheimer dementia transferred from [facility] s/p unwitnessed fall with head strike at home found by wife on floor at 8am. Patient brought to urgent care by wife and had 5 staples to posterior scalp but was instructed to go to nearest ED.  CT head at [place] showed R frontal IPH, SDH, SAH at 9:00. CTA stroke protocol not performed at [facility]. Patient BIB on 6L NC.  Pt GCS 15 on arrival, A&Ox2 on arrival. After initial assessment, patient noted to be vomiting enroute to CT scanner.    (10 Nov 2023 11:04)      INTERVAL HPI/OVERNIGHT EVENTS:  83 year old male, seen and examined by neurosurgery team, sitting comfortably in bed. Patient is POD#21 s/p R craniectomy for IPH for Dr. Larios. No acute overnight events reported.      Vital Signs Last 24 Hrs  T(C): 36.9 (01 Dec 2023 08:30), Max: 37.5 (01 Dec 2023 00:02)  T(F): 98.5 (01 Dec 2023 08:30), Max: 99.5 (01 Dec 2023 00:02)  HR: 85 (01 Dec 2023 06:00) (70 - 100)  BP: 119/64 (01 Dec 2023 06:00) (104/70 - 152/86)  BP(mean): 75 (01 Dec 2023 06:00) (70 - 106)  RR: 18 (01 Dec 2023 06:00) (10 - 20)  SpO2: 98% (01 Dec 2023 06:00) (96% - 100%)    Parameters below as of 01 Dec 2023 06:00  Patient On (Oxygen Delivery Method): room air        PHYSICAL EXAM:  GENERAL: NAD, lying comfortably in bed   HEAD: S/p R craniectomy, incision site C/D/I with no active drainage/bleeding noted, flap full but soft   JOEY COMA SCORE: E-3 V-4 M-5 = 12  MENTAL STATUS: AAO x2 (person, hospital, but not year/date); Awake; Opens eyes to voice; Minimally conversant; Following commands on R side   CRANIAL NERVES: PERRL 4mm b/l. EOMI, no gaze deviation. Hearing/speech grossly intact   MOTOR: RUE 5/5; LUE WD; RLE 4/5; LLE WD  SENSATION: grossly intact to light touch on R side and noxious stimuli on L side throughout       LABS:                        8.3    8.53  )-----------( 296      ( 01 Dec 2023 05:13 )             24.6     12-01    133<L>  |  99  |  33.2<H>  ----------------------------<  114<H>  4.4   |  24.0  |  1.12    Ca    8.9      01 Dec 2023 05:13  Phos  2.8     12-01  Mg     1.8     12-01    TPro  6.3<L>  /  Alb  3.1<L>  /  TBili  0.7  /  DBili  x   /  AST  16  /  ALT  14  /  AlkPhos  95  12-01      Urinalysis Basic - ( 01 Dec 2023 05:13 )    Color: x / Appearance: x / SG: x / pH: x  Gluc: 114 mg/dL / Ketone: x  / Bili: x / Urobili: x   Blood: x / Protein: x / Nitrite: x   Leuk Esterase: x / RBC: x / WBC x   Sq Epi: x / Non Sq Epi: x / Bacteria: x        11-30 @ 07:01  -  12-01 @ 07:00  --------------------------------------------------------  IN: 225 mL / OUT: 1925 mL / NET: -1700 mL        RADIOLOGY & ADDITIONAL TESTS:  CT Head No Cont (11.29.23 @ 08:58):  IMPRESSION:   Stable intracranial hemorrhages.    CT Head No Cont (11.28.23 @ 20:36):  IMPRESSION:   Stable intracranial hemorrhages.    CT Head No Cont (11.27.23 @ 10:32):  IMPRESSION:  Grossly stable CT examination of the head when compared prior imaging.    CT Head No Cont (11.26.23 @ 21:59):  IMPRESSION:   Overall stable follow-up CT exam when compared with the most   recent prior CT study. HPI:  82y M with PMH HTN, CAD s/p stents, renal cell carcinoma status post left nephrectomy, bladder CA, BPH, CHF, LBBB, vertigo,  HLD, 5.5cm AAA without rupture post EVAR, paroxysmal A-fib on Eliquis, Plavix, and aspirin, early stage Alzheimer dementia transferred from [facility] s/p unwitnessed fall with head strike at home found by wife on floor at 8am. Patient brought to urgent care by wife and had 5 staples to posterior scalp but was instructed to go to nearest ED.  CT head at [place] showed R frontal IPH, SDH, SAH at 9:00. CTA stroke protocol not performed at [facility]. Patient BIB on 6L NC.  Pt GCS 15 on arrival, A&Ox2 on arrival. After initial assessment, patient noted to be vomiting enroute to CT scanner.    (10 Nov 2023 11:04)      INTERVAL HPI/OVERNIGHT EVENTS:  83 year old male, seen and examined by neurosurgery team, sitting comfortably in bed. Patient is POD#21 s/p R craniectomy for IPH for Dr. Larios. No acute overnight events reported.      Vital Signs Last 24 Hrs  T(C): 36.9 (01 Dec 2023 08:30), Max: 37.5 (01 Dec 2023 00:02)  T(F): 98.5 (01 Dec 2023 08:30), Max: 99.5 (01 Dec 2023 00:02)  HR: 85 (01 Dec 2023 06:00) (70 - 100)  BP: 119/64 (01 Dec 2023 06:00) (104/70 - 152/86)  BP(mean): 75 (01 Dec 2023 06:00) (70 - 106)  RR: 18 (01 Dec 2023 06:00) (10 - 20)  SpO2: 98% (01 Dec 2023 06:00) (96% - 100%)    Parameters below as of 01 Dec 2023 06:00  Patient On (Oxygen Delivery Method): room air        PHYSICAL EXAM:  GENERAL: NAD, lying comfortably in bed   HEAD: S/p R craniectomy, incision site C/D/I with no active drainage/bleeding noted, flap full but soft   JOEY COMA SCORE: E-3 V-4 M-5 = 12  MENTAL STATUS: AAO x2 (person, hospital, but not year/date); Awake; Opens eyes to voice; Minimally conversant; Following commands on R side   CRANIAL NERVES: PERRL 4mm b/l. EOMI, no gaze deviation. Hearing/speech grossly intact   MOTOR: RUE 5/5; LUE WD; RLE 4/5; LLE WD  SENSATION: grossly intact to light touch on R side and noxious stimuli on L side throughout       LABS:                        8.3    8.53  )-----------( 296      ( 01 Dec 2023 05:13 )             24.6     12-01    133<L>  |  99  |  33.2<H>  ----------------------------<  114<H>  4.4   |  24.0  |  1.12    Ca    8.9      01 Dec 2023 05:13  Phos  2.8     12-01  Mg     1.8     12-01    TPro  6.3<L>  /  Alb  3.1<L>  /  TBili  0.7  /  DBili  x   /  AST  16  /  ALT  14  /  AlkPhos  95  12-01      Urinalysis Basic - ( 01 Dec 2023 05:13 )    Color: x / Appearance: x / SG: x / pH: x  Gluc: 114 mg/dL / Ketone: x  / Bili: x / Urobili: x   Blood: x / Protein: x / Nitrite: x   Leuk Esterase: x / RBC: x / WBC x   Sq Epi: x / Non Sq Epi: x / Bacteria: x        11-30 @ 07:01  -  12-01 @ 07:00  --------------------------------------------------------  IN: 225 mL / OUT: 1925 mL / NET: -1700 mL        RADIOLOGY & ADDITIONAL TESTS:  CT Head No Cont (11.29.23 @ 08:58):  IMPRESSION:   Stable intracranial hemorrhages.    CT Head No Cont (11.28.23 @ 20:36):  IMPRESSION:   Stable intracranial hemorrhages.    CT Head No Cont (11.27.23 @ 10:32):  IMPRESSION:  Grossly stable CT examination of the head when compared prior imaging.    CT Head No Cont (11.26.23 @ 21:59):  IMPRESSION:   Overall stable follow-up CT exam when compared with the most   recent prior CT study. HPI:  82y M with PMH HTN, CAD s/p stents, renal cell carcinoma status post left nephrectomy, bladder CA, BPH, CHF, LBBB, vertigo,  HLD, 5.5cm AAA without rupture post EVAR, paroxysmal A-fib on Eliquis, Plavix, and aspirin, early stage Alzheimer dementia transferred from [facility] s/p unwitnessed fall with head strike at home found by wife on floor at 8am. Patient brought to urgent care by wife and had 5 staples to posterior scalp but was instructed to go to nearest ED.  CT head at [place] showed R frontal IPH, SDH, SAH at 9:00. CTA stroke protocol not performed at [facility]. Patient BIB on 6L NC.  Pt GCS 15 on arrival, A&Ox2 on arrival. After initial assessment, patient noted to be vomiting enroute to CT scanner.    (10 Nov 2023 11:04)      INTERVAL HPI/OVERNIGHT EVENTS:  83 year old male, seen and examined by neurosurgery team, sitting comfortably in bed. Patient is POD#21 s/p R craniectomy for IPH for Dr. Larios. No acute overnight events reported.      Vital Signs Last 24 Hrs  T(C): 36.9 (01 Dec 2023 08:30), Max: 37.5 (01 Dec 2023 00:02)  T(F): 98.5 (01 Dec 2023 08:30), Max: 99.5 (01 Dec 2023 00:02)  HR: 85 (01 Dec 2023 06:00) (70 - 100)  BP: 119/64 (01 Dec 2023 06:00) (104/70 - 152/86)  BP(mean): 75 (01 Dec 2023 06:00) (70 - 106)  RR: 18 (01 Dec 2023 06:00) (10 - 20)  SpO2: 98% (01 Dec 2023 06:00) (96% - 100%)    Parameters below as of 01 Dec 2023 06:00  Patient On (Oxygen Delivery Method): room air        PHYSICAL EXAM:  GENERAL: NAD, lying comfortably in bed   HEAD: S/p R craniectomy, incision site C/D/I with no active drainage/bleeding noted, flap full but soft   JOEY COMA SCORE: E-3 V-4 M-5 = 12  MENTAL STATUS: AAO x2 (person, hospital, but not year/date); Awake; Opens eyes to voice; Minimally conversant; Following commands on R side   CRANIAL NERVES: PERRL 4mm b/l. EOMI, no gaze deviation. Hearing/speech grossly intact   MOTOR: RUE 5/5; LUE WD; RLE 4/5; LLE WD  SENSATION: grossly intact to light touch on R side and noxious stimuli on L side throughout       LABS:                        8.3    8.53  )-----------( 296      ( 01 Dec 2023 05:13 )             24.6     12-01    133<L>  |  99  |  33.2<H>  ----------------------------<  114<H>  4.4   |  24.0  |  1.12    Ca    8.9      01 Dec 2023 05:13  Phos  2.8     12-01  Mg     1.8     12-01    TPro  6.3<L>  /  Alb  3.1<L>  /  TBili  0.7  /  DBili  x   /  AST  16  /  ALT  14  /  AlkPhos  95  12-01      Urinalysis Basic - ( 01 Dec 2023 05:13 )    Color: x / Appearance: x / SG: x / pH: x  Gluc: 114 mg/dL / Ketone: x  / Bili: x / Urobili: x   Blood: x / Protein: x / Nitrite: x   Leuk Esterase: x / RBC: x / WBC x   Sq Epi: x / Non Sq Epi: x / Bacteria: x        11-30 @ 07:01  -  12-01 @ 07:00  --------------------------------------------------------  IN: 225 mL / OUT: 1925 mL / NET: -1700 mL        RADIOLOGY & ADDITIONAL TESTS:  CT Head No Cont (11.29.23 @ 08:58):  IMPRESSION:   Stable intracranial hemorrhages.    CT Head No Cont (11.28.23 @ 20:36):  IMPRESSION:   Stable intracranial hemorrhages.    CT Head No Cont (11.27.23 @ 10:32):  IMPRESSION:  Grossly stable CT examination of the head when compared prior imaging.    CT Head No Cont (11.26.23 @ 21:59):  IMPRESSION:   Overall stable follow-up CT exam when compared with the most   recent prior CT study.

## 2023-12-01 NOTE — PROGRESS NOTE ADULT - SUBJECTIVE AND OBJECTIVE BOX
seen for ICH    no acute events      MEDICATIONS  (STANDING):  apixaban 5 milliGRAM(s) Oral every 12 hours  atorvastatin 80 milliGRAM(s) Oral at bedtime  chlorhexidine 2% Cloths 1 Application(s) Topical <User Schedule>  diltiazem    Tablet 60 milliGRAM(s) Oral <User Schedule>  folic acid 1 milliGRAM(s) Oral daily  melatonin 6 milliGRAM(s) Oral at bedtime  memantine 5 milliGRAM(s) Oral two times a day  metoprolol tartrate 50 milliGRAM(s) Oral <User Schedule>  Nephro-roma 1 Tablet(s) Oral daily  polyethylene glycol 3350 17 Gram(s) Oral daily  senna 2 Tablet(s) Oral at bedtime  tamsulosin 0.8 milliGRAM(s) Oral at bedtime    MEDICATIONS  (PRN):  acetaminophen     Tablet .. 650 milliGRAM(s) Oral every 6 hours PRN Temp greater or equal to 38C (100.4F), Mild Pain (1 - 3)  hydrALAZINE Injectable 10 milliGRAM(s) IV Push every 2 hours PRN SBP>160  labetalol Injectable 10 milliGRAM(s) IV Push every 2 hours PRN SBP>160  QUEtiapine 12.5 milliGRAM(s) Oral at bedtime PRN delirium      Allergies    penicillin (Rash)  heparin (Other (Severe))  penicillin (Hives)  Cipro (Other)  Levaquin (Other)  heparin (Other)        Vital Signs Last 24 Hrs  T(C): 36.9 (01 Dec 2023 08:30), Max: 37.5 (01 Dec 2023 00:02)  T(F): 98.5 (01 Dec 2023 08:30), Max: 99.5 (01 Dec 2023 00:02)  HR: 83 (01 Dec 2023 10:00) (70 - 100)  BP: 132/62 (01 Dec 2023 10:00) (104/70 - 152/86)  BP(mean): 77 (01 Dec 2023 10:00) (70 - 106)  RR: 19 (01 Dec 2023 10:00) (13 - 20)  SpO2: 98% (01 Dec 2023 10:00) (95% - 100%)    Parameters below as of 01 Dec 2023 10:00  Patient On (Oxygen Delivery Method): room air        PHYSICAL EXAM:    GENERAL: NAD interactive today  CHEST/LUNG: Clear to ausculation bilaterally  HEART: Regular rate and rhythm; S1 S2  ABDOMEN: Soft,  Bowel sounds present  EXTREMITIES: no edema   NERVOUS SYSTEM:  Alert & Oriented X2 4/5 RUE/RLE  0/5 LUE 1/5 LLE.    LABS:                        8.3    8.53  )-----------( 296      ( 01 Dec 2023 05:13 )             24.6     12-01    133<L>  |  99  |  33.2<H>  ----------------------------<  114<H>  4.4   |  24.0  |  1.12    Ca    8.9      01 Dec 2023 05:13  Phos  2.8     12-01  Mg     1.8     12-01    TPro  6.3<L>  /  Alb  3.1<L>  /  TBili  0.7  /  DBili  x   /  AST  16  /  ALT  14  /  AlkPhos  95  12-01      Urinalysis Basic - ( 01 Dec 2023 05:13 )    Color: x / Appearance: x / SG: x / pH: x  Gluc: 114 mg/dL / Ketone: x  / Bili: x / Urobili: x   Blood: x / Protein: x / Nitrite: x   Leuk Esterase: x / RBC: x / WBC x   Sq Epi: x / Non Sq Epi: x / Bacteria: x        CAPILLARY BLOOD GLUCOSE            RADIOLOGY & ADDITIONAL TESTS:

## 2023-12-01 NOTE — PROGRESS NOTE ADULT - ATTENDING COMMENTS
Patient seen and examined, discussed patient with Dr. Tovar and agree with recommendations.    Rehab/Impaired mobility and function - Patient continues to require hospitalization for the above diagnoses and ongoing active management of comorbid complications (ICU level care) that are substantially impairing functional ability and impairing quality of life necessitating ongoing medical management of these complications.     Patient will need rehab pending progress. Will continue to follow. Rehab recommendations are dependent on how functional progress changes as well as how patient continues to participate and tolerate therapeutic interventions, which may change. Recommend ongoing mobilization by staff to maintain cardiopulmonary function and prevention of secondary complications related to debility. Discussed the specific management and recommendations above with rehab clinical care team/rehab liaison.

## 2023-12-01 NOTE — PROGRESS NOTE ADULT - ASSESSMENT
82 y/o male with PMHx of HTN, CAD s/p stents, RCC s/p L nephrectomy, bladder CA, BPH, CHF, LBBB, vertigo, HLD, 5.5cm AAA without rupture post EVAR, paroxysmal A-fib on Eliquis, Plavix, and aspirin, early stage Alzheimer dementia transferred from [facility] 11/10/23 s/p unwitnessed fall with head strike at home found by wife on floor at 8am. CT head at [place] showed R frontal IPH, SDH, SAH at 9:00. s/p R craniectomy for ICH 11/10. Hospital course complicated by Klebsiella PNA, hypernatremia, lethargy, LUE DVT on Eliquis. Downgraded to stepdown on 11/23, re-upgraded to NSICU on 11/26 for new acute anterior falx SDH. Downgraded back to floor on 11/28, stable.    Plan:  -D/w attending Dr. Larios  -Q2 hour neuro checks  -Helmet to be worn when OOB when OOB  -Continue amantadine 150mg QAM  -Continue memantine 5mg BID   -Pain control PRN, avoid oversedation  -STAT CTH for any decline in neuro exam   -SBP goal normotensive 100-160  -Cranioplasty planning, flap remains full, CTH done and sent to [facility]  -DVT prophylaxis: SCDs, Eliquis 5mg BID   -OK to restart AC from nsx standpoint (Eliquis 5BID recommended by pharmacy instead of full dose fondaparinux due to renal function). Cranioplasty planning for likely OR next Thursday 12/7 tentatively. HOLD AC starting 12/4 night. D/w Dr. Larios and Dr. Diego  -Further medical management/supportive care per primary team    82 y/o male with PMHx of HTN, CAD s/p stents, RCC s/p L nephrectomy, bladder CA, BPH, CHF, LBBB, vertigo, HLD, 5.5cm AAA without rupture post EVAR, paroxysmal A-fib on Eliquis, Plavix, and aspirin, early stage Alzheimer dementia transferred from [facility] 11/10/23 s/p unwitnessed fall with head strike at home found by wife on floor at 8am. CT head at [place] showed R frontal IPH, SDH, SAH at 9:00. s/p R craniectomy for ICH 11/10. Hospital course complicated by Klebsiella PNA, hypernatremia, lethargy, LUE DVT on Eliquis. Downgraded to stepdown on 11/23, re-upgraded to NSICU on 11/26 for new acute anterior falx SDH. Downgraded back to floor on 11/28, stable.    Plan:  -D/w attending Dr. Larios  -Q2 hour neuro checks  -Helmet to be worn when OOB when OOB  -Continue amantadine 150mg QAM  -Continue memantine 5mg BID   -Pain control PRN, avoid oversedation  -STAT CTH for any decline in neuro exam   -SBP goal normotensive 100-160  -Cranioplasty planning, flap remains full, CTH done and sent to [facility]  -DVT prophylaxis: SCDs, Eliquis 5mg BID   -OK to restart AC from nsx standpoint (Eliquis 5BID recommended by pharmacy instead of full dose fondaparinux due to renal function). Cranioplasty planning for likely OR next Thursday 12/7 tentatively. HOLD AC starting 12/4 night. D/w Dr. Larios and Dr. Diego  -Further medical management/supportive care per primary team    84 y/o male with PMHx of HTN, CAD s/p stents, RCC s/p L nephrectomy, bladder CA, BPH, CHF, LBBB, vertigo, HLD, 5.5cm AAA without rupture post EVAR, paroxysmal A-fib on Eliquis, Plavix, and aspirin, early stage Alzheimer dementia transferred from [facility] 11/10/23 s/p unwitnessed fall with head strike at home found by wife on floor at 8am. CT head at [place] showed R frontal IPH, SDH, SAH at 9:00. s/p R craniectomy for ICH 11/10. Hospital course complicated by Klebsiella PNA, hypernatremia, lethargy, LUE DVT on Eliquis. Downgraded to stepdown on 11/23, re-upgraded to NSICU on 11/26 for new acute anterior falx SDH. Downgraded back to floor on 11/28, stable.    Plan:  -D/w attending Dr. Larios  -Q2 hour neuro checks  -Helmet to be worn when OOB when OOB  -Continue amantadine 150mg QAM  -Continue memantine 5mg BID   -Pain control PRN, avoid oversedation  -STAT CTH for any decline in neuro exam   -SBP goal normotensive 100-160  -Cranioplasty planning, flap remains full, CTH done and sent to [facility]  -DVT prophylaxis: SCDs, Eliquis 5mg BID   -OK to restart AC from nsx standpoint (Eliquis 5BID recommended by pharmacy instead of full dose fondaparinux due to renal function). Cranioplasty planning for likely OR next Thursday 12/7 tentatively. HOLD AC starting 12/4 night. D/w Dr. Larios and Dr. Diego  -Further medical management/supportive care per primary team    82 y/o male with PMHx of HTN, CAD s/p stents, RCC s/p L nephrectomy, bladder CA, BPH, CHF, LBBB, vertigo, HLD, 5.5cm AAA without rupture post EVAR, paroxysmal A-fib on Eliquis, Plavix, and aspirin, early stage Alzheimer dementia transferred from [facility] 11/10/23 s/p unwitnessed fall with head strike at home found by wife on floor at 8am. CT head at [place] showed R frontal IPH, SDH, SAH at 9:00. s/p R craniectomy for ICH 11/10. Hospital course complicated by Klebsiella PNA, hypernatremia, lethargy, LUE DVT on Eliquis. Downgraded to stepdown on 11/23, re-upgraded to NSICU on 11/26 for new acute anterior falx SDH. Downgraded back to floor on 11/28, stable.    Plan:  -D/w attending Dr. Larios  -Q2 hour neuro checks  -Helmet to be worn when OOB when OOB  -Continue amantadine 150mg QAM  -Continue memantine 5mg BID   -Pain control PRN, avoid oversedation  -STAT CTH for any decline in neuro exam   -SBP goal normotensive 100-160  -Cranioplasty planning, flap remains full, CTH done and sent to [facility]  -DVT prophylaxis: SCDs, Eliquis 5mg BID   -OK to restart AC from nsx standpoint (Eliquis 5BID recommended by pharmacy instead of full dose fondaparinux due to renal function). Cranioplasty planning for likely OR next Thursday 12/7 tentatively. HOLD AC starting 12/4 night. D/w Dr. Larios and Dr. Diego. Will obtain CTH 12/2 AM to assess for stability s/p starting Eliquis  -Further medical management/supportive care per primary team    84 y/o male with PMHx of HTN, CAD s/p stents, RCC s/p L nephrectomy, bladder CA, BPH, CHF, LBBB, vertigo, HLD, 5.5cm AAA without rupture post EVAR, paroxysmal A-fib on Eliquis, Plavix, and aspirin, early stage Alzheimer dementia transferred from [facility] 11/10/23 s/p unwitnessed fall with head strike at home found by wife on floor at 8am. CT head at [place] showed R frontal IPH, SDH, SAH at 9:00. s/p R craniectomy for ICH 11/10. Hospital course complicated by Klebsiella PNA, hypernatremia, lethargy, LUE DVT on Eliquis. Downgraded to stepdown on 11/23, re-upgraded to NSICU on 11/26 for new acute anterior falx SDH. Downgraded back to floor on 11/28, stable.    Plan:  -D/w attending Dr. Larios  -Q2 hour neuro checks  -Helmet to be worn when OOB when OOB  -Continue amantadine 150mg QAM  -Continue memantine 5mg BID   -Pain control PRN, avoid oversedation  -STAT CTH for any decline in neuro exam   -SBP goal normotensive 100-160  -Cranioplasty planning, flap remains full, CTH done and sent to [facility]  -DVT prophylaxis: SCDs, Eliquis 5mg BID   -OK to restart AC from nsx standpoint (Eliquis 5BID recommended by pharmacy instead of full dose fondaparinux due to renal function). Cranioplasty planning for likely OR next Thursday 12/7 tentatively. HOLD AC starting 12/4 night. D/w Dr. Larios and Dr. Diego. Will obtain CTH 12/2 AM to assess for stability s/p starting Eliquis  -Further medical management/supportive care per primary team    82 y/o male with PMHx of HTN, CAD s/p stents, RCC s/p L nephrectomy, bladder CA, BPH, CHF, LBBB, vertigo, HLD, 5.5cm AAA without rupture post EVAR, paroxysmal A-fib on Eliquis, Plavix, and aspirin, early stage Alzheimer dementia transferred from [facility] 11/10/23 s/p unwitnessed fall with head strike at home found by wife on floor at 8am. CT head at [place] showed R frontal IPH, SDH, SAH at 9:00. s/p R craniectomy for ICH 11/10. Hospital course complicated by Klebsiella PNA, hypernatremia, lethargy, LUE DVT on Eliquis. Downgraded to stepdown on 11/23, re-upgraded to NSICU on 11/26 for new acute anterior falx SDH. Downgraded back to floor on 11/28, stable.    Plan:  -D/w attending Dr. Larios  -Q2 hour neuro checks  -Helmet to be worn when OOB when OOB  -Continue amantadine 150mg QAM  -Continue memantine 5mg BID   -Pain control PRN, avoid oversedation  -STAT CTH for any decline in neuro exam   -SBP goal normotensive 100-160  -Cranioplasty planning, flap remains full, CTH done and sent to [facility]  -DVT prophylaxis: SCDs, Eliquis 5mg BID   -OK to restart AC from nsx standpoint (Eliquis 5BID recommended by pharmacy instead of full dose fondaparinux due to renal function). Cranioplasty planning for likely OR next Thursday 12/7 tentatively. HOLD AC starting 12/4 night. D/w Dr. Larios and Dr. Diego. Will obtain CTH 12/2 AM to assess for stability s/p starting Eliquis  -Further medical management/supportive care per primary team

## 2023-12-02 LAB
ANION GAP SERPL CALC-SCNC: 13 MMOL/L — SIGNIFICANT CHANGE UP (ref 5–17)
ANION GAP SERPL CALC-SCNC: 13 MMOL/L — SIGNIFICANT CHANGE UP (ref 5–17)
BUN SERPL-MCNC: 27.3 MG/DL — HIGH (ref 8–20)
BUN SERPL-MCNC: 27.3 MG/DL — HIGH (ref 8–20)
CALCIUM SERPL-MCNC: 8.5 MG/DL — SIGNIFICANT CHANGE UP (ref 8.4–10.5)
CALCIUM SERPL-MCNC: 8.5 MG/DL — SIGNIFICANT CHANGE UP (ref 8.4–10.5)
CHLORIDE SERPL-SCNC: 97 MMOL/L — SIGNIFICANT CHANGE UP (ref 96–108)
CHLORIDE SERPL-SCNC: 97 MMOL/L — SIGNIFICANT CHANGE UP (ref 96–108)
CO2 SERPL-SCNC: 21 MMOL/L — LOW (ref 22–29)
CO2 SERPL-SCNC: 21 MMOL/L — LOW (ref 22–29)
CREAT SERPL-MCNC: 0.95 MG/DL — SIGNIFICANT CHANGE UP (ref 0.5–1.3)
CREAT SERPL-MCNC: 0.95 MG/DL — SIGNIFICANT CHANGE UP (ref 0.5–1.3)
EGFR: 80 ML/MIN/1.73M2 — SIGNIFICANT CHANGE UP
EGFR: 80 ML/MIN/1.73M2 — SIGNIFICANT CHANGE UP
GLUCOSE SERPL-MCNC: 110 MG/DL — HIGH (ref 70–99)
GLUCOSE SERPL-MCNC: 110 MG/DL — HIGH (ref 70–99)
HCT VFR BLD CALC: 24.2 % — LOW (ref 39–50)
HCT VFR BLD CALC: 24.2 % — LOW (ref 39–50)
HGB BLD-MCNC: 8.3 G/DL — LOW (ref 13–17)
HGB BLD-MCNC: 8.3 G/DL — LOW (ref 13–17)
MAGNESIUM SERPL-MCNC: 1.8 MG/DL — SIGNIFICANT CHANGE UP (ref 1.6–2.6)
MAGNESIUM SERPL-MCNC: 1.8 MG/DL — SIGNIFICANT CHANGE UP (ref 1.6–2.6)
MCHC RBC-ENTMCNC: 31.8 PG — SIGNIFICANT CHANGE UP (ref 27–34)
MCHC RBC-ENTMCNC: 31.8 PG — SIGNIFICANT CHANGE UP (ref 27–34)
MCHC RBC-ENTMCNC: 34.3 GM/DL — SIGNIFICANT CHANGE UP (ref 32–36)
MCHC RBC-ENTMCNC: 34.3 GM/DL — SIGNIFICANT CHANGE UP (ref 32–36)
MCV RBC AUTO: 92.7 FL — SIGNIFICANT CHANGE UP (ref 80–100)
MCV RBC AUTO: 92.7 FL — SIGNIFICANT CHANGE UP (ref 80–100)
PHOSPHATE SERPL-MCNC: 2.9 MG/DL — SIGNIFICANT CHANGE UP (ref 2.4–4.7)
PHOSPHATE SERPL-MCNC: 2.9 MG/DL — SIGNIFICANT CHANGE UP (ref 2.4–4.7)
PLATELET # BLD AUTO: 244 K/UL — SIGNIFICANT CHANGE UP (ref 150–400)
PLATELET # BLD AUTO: 244 K/UL — SIGNIFICANT CHANGE UP (ref 150–400)
POTASSIUM SERPL-MCNC: 4.5 MMOL/L — SIGNIFICANT CHANGE UP (ref 3.5–5.3)
POTASSIUM SERPL-MCNC: 4.5 MMOL/L — SIGNIFICANT CHANGE UP (ref 3.5–5.3)
POTASSIUM SERPL-SCNC: 4.5 MMOL/L — SIGNIFICANT CHANGE UP (ref 3.5–5.3)
POTASSIUM SERPL-SCNC: 4.5 MMOL/L — SIGNIFICANT CHANGE UP (ref 3.5–5.3)
RBC # BLD: 2.61 M/UL — LOW (ref 4.2–5.8)
RBC # BLD: 2.61 M/UL — LOW (ref 4.2–5.8)
RBC # FLD: 13.1 % — SIGNIFICANT CHANGE UP (ref 10.3–14.5)
RBC # FLD: 13.1 % — SIGNIFICANT CHANGE UP (ref 10.3–14.5)
SODIUM SERPL-SCNC: 130 MMOL/L — LOW (ref 135–145)
SODIUM SERPL-SCNC: 130 MMOL/L — LOW (ref 135–145)
WBC # BLD: 8.36 K/UL — SIGNIFICANT CHANGE UP (ref 3.8–10.5)
WBC # BLD: 8.36 K/UL — SIGNIFICANT CHANGE UP (ref 3.8–10.5)
WBC # FLD AUTO: 8.36 K/UL — SIGNIFICANT CHANGE UP (ref 3.8–10.5)
WBC # FLD AUTO: 8.36 K/UL — SIGNIFICANT CHANGE UP (ref 3.8–10.5)

## 2023-12-02 PROCEDURE — 99232 SBSQ HOSP IP/OBS MODERATE 35: CPT

## 2023-12-02 PROCEDURE — 70450 CT HEAD/BRAIN W/O DYE: CPT | Mod: 26

## 2023-12-02 RX ADMIN — Medication 50 MILLIGRAM(S): at 12:03

## 2023-12-02 RX ADMIN — ATORVASTATIN CALCIUM 80 MILLIGRAM(S): 80 TABLET, FILM COATED ORAL at 21:28

## 2023-12-02 RX ADMIN — SENNA PLUS 2 TABLET(S): 8.6 TABLET ORAL at 21:28

## 2023-12-02 RX ADMIN — MEMANTINE HYDROCHLORIDE 5 MILLIGRAM(S): 10 TABLET ORAL at 17:08

## 2023-12-02 RX ADMIN — Medication 6 MILLIGRAM(S): at 21:28

## 2023-12-02 RX ADMIN — Medication 60 MILLIGRAM(S): at 17:08

## 2023-12-02 RX ADMIN — Medication 50 MILLIGRAM(S): at 00:48

## 2023-12-02 RX ADMIN — Medication 60 MILLIGRAM(S): at 05:59

## 2023-12-02 RX ADMIN — APIXABAN 5 MILLIGRAM(S): 2.5 TABLET, FILM COATED ORAL at 06:00

## 2023-12-02 RX ADMIN — CHLORHEXIDINE GLUCONATE 1 APPLICATION(S): 213 SOLUTION TOPICAL at 06:04

## 2023-12-02 RX ADMIN — MODAFINIL 100 MILLIGRAM(S): 200 TABLET ORAL at 12:02

## 2023-12-02 RX ADMIN — Medication 1 TABLET(S): at 12:02

## 2023-12-02 RX ADMIN — MEMANTINE HYDROCHLORIDE 5 MILLIGRAM(S): 10 TABLET ORAL at 05:59

## 2023-12-02 RX ADMIN — APIXABAN 5 MILLIGRAM(S): 2.5 TABLET, FILM COATED ORAL at 17:08

## 2023-12-02 RX ADMIN — Medication 1 MILLIGRAM(S): at 12:03

## 2023-12-02 RX ADMIN — TAMSULOSIN HYDROCHLORIDE 0.8 MILLIGRAM(S): 0.4 CAPSULE ORAL at 21:28

## 2023-12-02 NOTE — PROGRESS NOTE ADULT - ASSESSMENT
83M with HTN, CAD s/p PCO, RCC s/p left nephrectomy, bladder Ca, BPH, CHF, Vertigo, HLD, AAA s/p EVAR, parox Afib, h/o HIT, Alzheimers transferred s/p fall with Rt frontal IPH. Initially had hemicraniectomy course complicated by RVR, asp PNA, hypoxic resp failure, LUE DVT and questionable increase in AAA size. Started on AC with waxing/waning mental status prompting head imaging with evidence of new bleed. Now downgrade to Medicine     [facility] -s/p Rt hemicraniotomy   AC for DVT/Afib with demonstration of new bleed  -Q2 neurochecks  -Strict BP parameters (maintain SBP < 160, avoid rapid fluctuations)  -Continue Amantadine --> provigil   -Continue modified diet     Parox Afib/ HTN/HLD   -Continue Metoprolol/diltiazem BID  -Continue Statin   -Labetalol / Hydralazine PRN   -Continue Fondaparinux --> eliquis until 12/4 per NSx       Questionable veg on TTE - discussed with iD- recommends WHIT  -family is hesitant discussed with wife - await call back from daughter  -f/u with Cardiology to determine if plan for WHIT     Acute DVT   -Continue AC as above, avoid heparin products 2/2 h/o HIT    Normocytic Anemia   Stools visualized. Normal color, no melena / evidence of GI blood loss  Repeat in am along with T&S,  transfuse to goal > 8 given h/o CAD    AAA s/p EVAR   Imaging reviewed by Vascular surgery  No intervention planned at present  Recommended obtaining prior imaging from pts Vascular surgeon (Dr Heredia for comparison)    DONNIE- resolved   Suspect may have CKD II  Monitor and renally dose all medications   hall maintained, TOV when more ambulatory/ mental status improved     Acute hypoxic resp failure 2/2 suspected asp PNA   Resolved   s/p completion of IV Abx   ID recs reviewed   83M with HTN, CAD s/p PCO, RCC s/p left nephrectomy, bladder Ca, BPH, CHF, Vertigo, HLD, AAA s/p EVAR, parox Afib, h/o HIT, Alzheimers transferred s/p fall with Rt frontal IPH. Initially had hemicraniectomy course complicated by RVR, asp PNA, hypoxic resp failure, LUE DVT and questionable increase in AAA size. Started on AC with waxing/waning mental status prompting head imaging with evidence of new bleed. Now downgrade to Medicine.     [facility] -s/p Rt hemicraniotomy   -likely sec to AC  for DVT/Afib with demonstration of new bleed  -Q2 neurochecks  -repeat CTH am reviewed,stable  -SBP goal normotensive 100-160  -Continue amantadine 150mg QAM  -Continue memantine 5mg BID   -Cranioplasty planning for likely OR next Thursday 12/7 tentatively. HOLD AC starting 12/4 night.  -Strict BP parameters (maintain SBP < 160, avoid rapid fluctuations)  -Continue Amantadine --> provigil   -Continue modified diet   -Helmet to be worn when OOB when OOB      Parox Afib/ HTN/HLD   -Continue Metoprolol/diltiazem BID  -Continue Statin   -Labetalol / Hydralazine PRN   -Continue  eliquis until 12/4 per NSx       Questionable veg on TTE - discussed with iD- recommends WHIT  -family is hesitant discussed with wife - await call back from daughter  -f/u with Cardiology to determine if plan for WHIT     Acute DVT   -Continue AC as above, avoid heparin products 2/2 h/o HIT    Normocytic Anemia   Stools visualized. Normal color, no melena / evidence of GI blood loss  Repeat in am along with T&S,  transfuse to goal > 8 given h/o CAD    AAA s/p EVAR   Imaging reviewed by Vascular surgery  No intervention planned at present  Recommended obtaining prior imaging from pts Vascular surgeon (Dr Heredia for comparison)    DONNIE- resolved   Suspect may have CKD II  Monitor and renally dose all medications   hall maintained, TOV when more ambulatory/ mental status improved     Acute hypoxic resp failure 2/2 suspected asp PNA   Resolved   s/p completion of IV Abx   ID recs reviewed    Plan of care dw girlfriend at bedside.

## 2023-12-02 NOTE — PROGRESS NOTE ADULT - SUBJECTIVE AND OBJECTIVE BOX
Patient is a 83y old  Male who presents with a chief complaint of s/p unwitnessed fall with head strike (02 Dec 2023 08:15)      pt is confuse,oriented to person. denies any pain,headache,cp,sob  REVIEW OF SYSTEMS: All systems are reviewed and found to be negative except above--not reliable for mental status    MEDICATIONS  (STANDING):  apixaban 5 milliGRAM(s) Oral every 12 hours  atorvastatin 80 milliGRAM(s) Oral at bedtime  chlorhexidine 2% Cloths 1 Application(s) Topical <User Schedule>  diltiazem    Tablet 60 milliGRAM(s) Oral <User Schedule>  folic acid 1 milliGRAM(s) Oral daily  melatonin 6 milliGRAM(s) Oral at bedtime  memantine 5 milliGRAM(s) Oral two times a day  metoprolol tartrate 50 milliGRAM(s) Oral <User Schedule>  modafinil 100 milliGRAM(s) Oral daily  Nephro-roma 1 Tablet(s) Oral daily  polyethylene glycol 3350 17 Gram(s) Oral daily  senna 2 Tablet(s) Oral at bedtime  tamsulosin 0.8 milliGRAM(s) Oral at bedtime    MEDICATIONS  (PRN):  acetaminophen     Tablet .. 650 milliGRAM(s) Oral every 6 hours PRN Temp greater or equal to 38C (100.4F), Mild Pain (1 - 3)  hydrALAZINE Injectable 10 milliGRAM(s) IV Push every 2 hours PRN SBP>160  labetalol Injectable 10 milliGRAM(s) IV Push every 2 hours PRN SBP>160  QUEtiapine 12.5 milliGRAM(s) Oral at bedtime PRN delirium      CAPILLARY BLOOD GLUCOSE        I&O's Summary    01 Dec 2023 07:01  -  02 Dec 2023 07:00  --------------------------------------------------------  IN: 0 mL / OUT: 1025 mL / NET: -1025 mL        PHYSICAL EXAM:  Vital Signs Last 24 Hrs  T(C): 36.6 (02 Dec 2023 08:00), Max: 36.8 (01 Dec 2023 19:00)  T(F): 97.9 (02 Dec 2023 08:00), Max: 98.3 (02 Dec 2023 00:02)  HR: 75 (02 Dec 2023 10:00) (68 - 94)  BP: 124/60 (02 Dec 2023 10:00) (98/59 - 151/72)  BP(mean): 84 (02 Dec 2023 06:00) (55 - 96)  RR: 18 (02 Dec 2023 10:00) (16 - 21)  SpO2: 95% (02 Dec 2023 10:00) (95% - 99%)    Parameters below as of 02 Dec 2023 10:00  Patient On (Oxygen Delivery Method): room air        CONSTITUTIONAL: NAD,  EYES: PERRLA; conjunctiva and sclera clear  ENMT: Moist oral mucosa,   RESPIRATORY: Normal respiratory effort; lungs are clear to auscultation bilaterally  CARDIOVASCULAR: Regular rate and rhythm, normal S1 and S2, no murmur   EXTS: No lower extremity edema; Peripheral pulses are 2+ bilaterally  ABDOMEN: Nontender to palpation, normoactive bowel sounds, no rebound/guarding;   MUSCLOSKELETAL:  no joint swelling or tenderness to palpation  PSYCH: calm  NEUROLOGY: A+O to person only , not to place, and time; moves rt foot toes. + motor UE.limited exam as pt pt follows few commands      LABS:                        8.3    8.36  )-----------( 244      ( 02 Dec 2023 07:46 )             24.2     12-02    130<L>  |  97  |  27.3<H>  ----------------------------<  110<H>  4.5   |  21.0<L>  |  0.95    Ca    8.5      02 Dec 2023 07:46  Phos  2.9     12-02  Mg     1.8     12-02    TPro  6.3<L>  /  Alb  3.1<L>  /  TBili  0.7  /  DBili  x   /  AST  16  /  ALT  14  /  AlkPhos  95  12-01          Urinalysis Basic - ( 02 Dec 2023 07:46 )    Color: x / Appearance: x / SG: x / pH: x  Gluc: 110 mg/dL / Ketone: x  / Bili: x / Urobili: x   Blood: x / Protein: x / Nitrite: x   Leuk Esterase: x / RBC: x / WBC x   Sq Epi: x / Non Sq Epi: x / Bacteria: x          RADIOLOGY & ADDITIONAL TESTS:  Results Reviewed:

## 2023-12-02 NOTE — PROGRESS NOTE ADULT - ASSESSMENT
83M w/ PMHS of HTN, CAD s/p stents, RCC s/p L nephrectomy, bladder CA, BPH, CHF, LBBB, vertigo, HLD, 5.5cm AAA without rupture post EVAR, paroxysmal A-fib on Eliquis, Plavix, and aspirin, early stage Alzheimer dementia transferred from [facility] 11/10/23 s/p unwitnessed fall with head strike at home found by wife on floor at 8am. CT head at [place] showed R frontal IPH, SDH, SAH at 9:00. s/p R craniectomy for ICH 11/10. Hospital course complicated by Klebsiella PNA, hypernatremia, lethargy, LUE DVT on Eliquis. Downgraded to stepdown on 11/23, re-upgraded to NSICU on 11/26 for new acute anterior falx SDH. Downgraded back to floor on 11/28, stable.      Plan  - Q2 hour neuro checks w/ protected sleep   - Helmet to be worn when OOB   - Amantadine 150mg QAM  - Memantine 5mg BID   - Pain control PRN, avoid oversedation  - STAT CTH for any decline in neuro exam   - SBP goal normotensive 100-160  - Cranioplasty planning, flap remains full, CTH done and sent to [employer] rep  - b/l SCDs, Eliquis 5mg BID   - OK to restart AC from nsx standpoint (Eliquis 5BID recommended by pharmacy instead of full dose fondaparinux due to renal function). Cranioplasty planning for likely OR next Thursday 12/7 tentatively. HOLD AC starting 12/4 night. D/w Dr. Larios and Dr. Diego. Will obtain CTH 12/2 AM to assess for stability s/p starting Eliquis  - Further medical management/supportive care per primary team   - D/w Dr. Styles   83M w/ PMHS of HTN, CAD s/p stents, RCC s/p L nephrectomy, bladder CA, BPH, CHF, LBBB, vertigo, HLD, 5.5cm AAA without rupture post EVAR, paroxysmal A-fib on Eliquis, Plavix, and aspirin, early stage Alzheimer dementia transferred from [facility] 11/10/23 s/p unwitnessed fall with head strike at home found by wife on floor at 8am. CT head at [place] showed R frontal IPH, SDH, SAH at 9:00. s/p R craniectomy for ICH 11/10. Hospital course complicated by Klebsiella PNA, hypernatremia, lethargy, LUE DVT on Eliquis. Downgraded to stepdown on 11/23, re-upgraded to NSICU on 11/26 for new acute anterior falx SDH. Downgraded back to floor on 11/28, stable.      Plan  - Q2 hour neuro checks w/ protected sleep   - Helmet to be worn when OOB   - Amantadine 150mg QAM  - Memantine 5mg BID   - Pain control PRN, avoid oversedation  - STAT CTH for any decline in neuro exam   - SBP goal normotensive 100-160  - Cranioplasty planning, flap remains full, CTH done and sent to [employer] rep  - b/l SCDs, Eliquis 5mg BID   - OK to restart AC from nsx standpoint (Eliquis 5BID recommended by pharmacy instead of full dose fondaparinux due to renal function). Cranioplasty planning for likely OR next Thursday 12/7 tentatively. HOLD AC starting 12/4 night. D/w Dr. Larios and Dr. Diego. Will obtain CTH 12/2 AM to assess for stability s/p starting Eliquis  - Further medical management/supportive care per primary team   - D/w Dr. Styles   83M w/ PMHS of HTN, CAD s/p stents, RCC s/p L nephrectomy, bladder CA, BPH, CHF, LBBB, vertigo, HLD, 5.5cm AAA without rupture post EVAR, paroxysmal A-fib on Eliquis, Plavix, and aspirin, early stage Alzheimer dementia transferred from [facility] 11/10/23 s/p unwitnessed fall with head strike at home found by wife on floor at 8am. CT head at [place] showed R frontal IPH, SDH, SAH at 9:00. s/p R craniectomy for ICH 11/10. Hospital course complicated by Klebsiella PNA, hypernatremia, lethargy, LUE DVT on Eliquis. Downgraded to stepdown on 11/23, re-upgraded to NSICU on 11/26 for new acute anterior falx SDH. Downgraded back to floor on 11/28, stable.      Plan  - Q2 hour neuro checks w/ protected sleep   - Helmet to be worn when OOB   - Amantadine 150mg QAM  - Memantine 5mg BID   - Pain control PRN, avoid oversedation  - STAT CTH for any decline in neuro exam   - SBP goal normotensive 100-160  - Cranioplasty planning, flap remains full, CTH done and sent to [employer] rep  - b/l SCDs, Eliquis 5mg BID   - OK to restart AC from nsx standpoint (Eliquis 5BID recommended by pharmacy instead of full dose fondaparinux due to renal function). Cranioplasty planning for likely OR next Thursday 12/7 tentatively. HOLD AC starting 12/4 night. D/w Dr. Larios and Dr. Diego. Will obtain CTH 12/2 AM to assess for stability s/p starting Eliquis  - Further medical management/supportive care per primary team   - D/w Dr. Styles

## 2023-12-02 NOTE — PROGRESS NOTE ADULT - SUBJECTIVE AND OBJECTIVE BOX
INTERVAL HPI/OVERNIGHT EVENTS:  84 y/o male with PMHx of HTN, CAD s/p stents, RCC s/p L nephrectomy, bladder CA, BPH, CHF, LBBB, vertigo, HLD, 5.5cm AAA without rupture post EVAR, paroxysmal A-fib on Eliquis, Plavix, and aspirin, early stage Alzheimer dementia transferred from [facility] 11/10/23 s/p unwitnessed fall with head strike at home found by wife on floor at 8am. CT head at [place] showed R frontal IPH, SDH, SAH at 9:00. s/p R craniectomy for ICH 11/10. Hospital course complicated by Klebsiella PNA, hypernatremia, lethargy, LUE DVT on Eliquis. Downgraded to stepdown on 11/23, re-upgraded to NSICU on 11/26 for new acute anterior falx SDH. Downgraded back to floor on 11/28, stable.  Patient seen and examined this morning. Discussed plan w/ patient and wife. Following commands briskly.     Vital Signs Last 24 Hrs  T(C): 36.6 (02 Dec 2023 08:00), Max: 36.8 (01 Dec 2023 19:00)  T(F): 97.9 (02 Dec 2023 08:00), Max: 98.3 (02 Dec 2023 00:02)  HR: 75 (02 Dec 2023 10:00) (68 - 94)  BP: 124/60 (02 Dec 2023 10:00) (98/59 - 151/72)  BP(mean): 84 (02 Dec 2023 06:00) (55 - 96)  RR: 18 (02 Dec 2023 10:00) (16 - 21)  SpO2: 95% (02 Dec 2023 10:00) (95% - 99%)  Parameters below as of 02 Dec 2023 10:00  Patient On (Oxygen Delivery Method): room air    PHYSICAL EXAM:  GENERAL: NAD, lying comfortably in bed   HEAD: S/p R craniectomy, incision site C/D/I with no active drainage/bleeding noted, flap full but soft   MENTAL STATUS: AAO x2 (person, hospital with choices, knows birthday) Awake; Opens eyes to spontanesouly; Minimally conversant; Following commands on R side   CRANIAL NERVES: PERRL 4mm b/l. EOMI, no gaze deviation. Hearing/speech grossly intact   MOTOR: RUE 5/5; LUE weak hand ; RLE 4/5; LLE WD  SENSATION: grossly intact to light touch on R side and noxious stimuli on L side throughout   RESP: Nonlabored breaths    LABS:             8.3    8.36  )-----------( 244      ( 02 Dec 2023 07:46 )             24.2     12-02    130<L>  |  97  |  27.3<H>  ----------------------------<  110<H>  4.5   |  21.0<L>  |  0.95    Ca    8.5      02 Dec 2023 07:46  Phos  2.9     12-02  Mg     1.8     12-02    TPro  6.3<L>  /  Alb  3.1<L>  /  TBili  0.7  /  DBili  x   /  AST  16  /  ALT  14  /  AlkPhos  95  12-01    Urinalysis Basic - ( 02 Dec 2023 07:46 )    Color: x / Appearance: x / SG: x / pH: x  Gluc: 110 mg/dL / Ketone: x  / Bili: x / Urobili: x   Blood: x / Protein: x / Nitrite: x   Leuk Esterase: x / RBC: x / WBC x   Sq Epi: x / Non Sq Epi: x / Bacteria: x    12-01 @ 07:01  -  12-02 @ 07:00  --------------------------------------------------------  IN: 0 mL / OUT: 1025 mL / NET: -1025 mL      RADIOLOGY & ADDITIONAL TESTS:  CT Head No Cont (11.29.23 @ 08:58):  IMPRESSION:   Stable intracranial hemorrhages.    CT Head No Cont (11.28.23 @ 20:36):  IMPRESSION:   Stable intracranial hemorrhages.    CT Head No Cont (11.27.23 @ 10:32):  IMPRESSION:  Grossly stable CT examination of the head when compared prior imaging.    CT Head No Cont (11.26.23 @ 21:59):  IMPRESSION:   Overall stable follow-up CT exam when compared with the most   recent prior CT study. INTERVAL HPI/OVERNIGHT EVENTS:  84 y/o male with PMHx of HTN, CAD s/p stents, RCC s/p L nephrectomy, bladder CA, BPH, CHF, LBBB, vertigo, HLD, 5.5cm AAA without rupture post EVAR, paroxysmal A-fib on Eliquis, Plavix, and aspirin, early stage Alzheimer dementia transferred from [facility] 11/10/23 s/p unwitnessed fall with head strike at home found by wife on floor at 8am. CT head at [place] showed R frontal IPH, SDH, SAH at 9:00. s/p R craniectomy for ICH 11/10. Hospital course complicated by Klebsiella PNA, hypernatremia, lethargy, LUE DVT on Eliquis. Downgraded to stepdown on 11/23, re-upgraded to NSICU on 11/26 for new acute anterior falx SDH. Downgraded back to floor on 11/28, stable.  Patient seen and examined this morning. Discussed plan w/ patient and wife. Following commands briskly.     Vital Signs Last 24 Hrs  T(C): 36.6 (02 Dec 2023 08:00), Max: 36.8 (01 Dec 2023 19:00)  T(F): 97.9 (02 Dec 2023 08:00), Max: 98.3 (02 Dec 2023 00:02)  HR: 75 (02 Dec 2023 10:00) (68 - 94)  BP: 124/60 (02 Dec 2023 10:00) (98/59 - 151/72)  BP(mean): 84 (02 Dec 2023 06:00) (55 - 96)  RR: 18 (02 Dec 2023 10:00) (16 - 21)  SpO2: 95% (02 Dec 2023 10:00) (95% - 99%)  Parameters below as of 02 Dec 2023 10:00  Patient On (Oxygen Delivery Method): room air    PHYSICAL EXAM:  GENERAL: NAD, lying comfortably in bed   HEAD: S/p R craniectomy, incision site C/D/I with no active drainage/bleeding noted, flap full but soft   MENTAL STATUS: AAO x2 (person, hospital with choices, knows birthday) Awake; Opens eyes to spontanesouly; Minimally conversant; Following commands on R side   CRANIAL NERVES: PERRL 4mm b/l. EOMI, no gaze deviation. Hearing/speech grossly intact   MOTOR: RUE 5/5; LUE weak hand ; RLE 4/5; LLE WD  SENSATION: grossly intact to light touch on R side and noxious stimuli on L side throughout   RESP: Nonlabored breaths    LABS:             8.3    8.36  )-----------( 244      ( 02 Dec 2023 07:46 )             24.2     12-02    130<L>  |  97  |  27.3<H>  ----------------------------<  110<H>  4.5   |  21.0<L>  |  0.95    Ca    8.5      02 Dec 2023 07:46  Phos  2.9     12-02  Mg     1.8     12-02    TPro  6.3<L>  /  Alb  3.1<L>  /  TBili  0.7  /  DBili  x   /  AST  16  /  ALT  14  /  AlkPhos  95  12-01    Urinalysis Basic - ( 02 Dec 2023 07:46 )    Color: x / Appearance: x / SG: x / pH: x  Gluc: 110 mg/dL / Ketone: x  / Bili: x / Urobili: x   Blood: x / Protein: x / Nitrite: x   Leuk Esterase: x / RBC: x / WBC x   Sq Epi: x / Non Sq Epi: x / Bacteria: x    12-01 @ 07:01  -  12-02 @ 07:00  --------------------------------------------------------  IN: 0 mL / OUT: 1025 mL / NET: -1025 mL      RADIOLOGY & ADDITIONAL TESTS:  CT Head No Cont (11.29.23 @ 08:58):  IMPRESSION:   Stable intracranial hemorrhages.    CT Head No Cont (11.28.23 @ 20:36):  IMPRESSION:   Stable intracranial hemorrhages.    CT Head No Cont (11.27.23 @ 10:32):  IMPRESSION:  Grossly stable CT examination of the head when compared prior imaging.    CT Head No Cont (11.26.23 @ 21:59):  IMPRESSION:   Overall stable follow-up CT exam when compared with the most   recent prior CT study. INTERVAL HPI/OVERNIGHT EVENTS:  84 y/o male with PMHx of HTN, CAD s/p stents, RCC s/p L nephrectomy, bladder CA, BPH, CHF, LBBB, vertigo, HLD, 5.5cm AAA without rupture post EVAR, paroxysmal A-fib on Eliquis, Plavix, and aspirin, early stage Alzheimer dementia transferred from [facility] 11/10/23 s/p unwitnessed fall with head strike at home found by wife on floor at 8am. CT head at [place] showed R frontal IPH, SDH, SAH at 9:00. s/p R craniectomy for ICH 11/10. Hospital course complicated by Klebsiella PNA, hypernatremia, lethargy, LUE DVT on Eliquis. Downgraded to stepdown on 11/23, re-upgraded to NSICU on 11/26 for new acute anterior falx SDH. Downgraded back to floor on 11/28, stable.  Patient seen and examined this morning. Discussed plan w/ patient and wife. Following commands briskly.     Vital Signs Last 24 Hrs  T(C): 36.6 (02 Dec 2023 08:00), Max: 36.8 (01 Dec 2023 19:00)  T(F): 97.9 (02 Dec 2023 08:00), Max: 98.3 (02 Dec 2023 00:02)  HR: 75 (02 Dec 2023 10:00) (68 - 94)  BP: 124/60 (02 Dec 2023 10:00) (98/59 - 151/72)  BP(mean): 84 (02 Dec 2023 06:00) (55 - 96)  RR: 18 (02 Dec 2023 10:00) (16 - 21)  SpO2: 95% (02 Dec 2023 10:00) (95% - 99%)  Parameters below as of 02 Dec 2023 10:00  Patient On (Oxygen Delivery Method): room air    PHYSICAL EXAM:  GENERAL: NAD, lying comfortably in bed   HEAD: S/p R craniectomy, incision site C/D/I with no active drainage/bleeding noted, flap full but soft   MENTAL STATUS: AAO x2 (person, hospital with choices, knows birthday) Awake; Opens eyes to spontanesouly; Minimally conversant; Following commands on R side   CRANIAL NERVES: PERRL 4mm b/l. EOMI, no gaze deviation. Hearing/speech grossly intact   MOTOR: RUE 5/5; LUE weak hand ; RLE 4/5; LLE WD  SENSATION: grossly intact to light touch on R side and noxious stimuli on L side throughout   RESP: Nonlabored breaths    LABS:             8.3    8.36  )-----------( 244      ( 02 Dec 2023 07:46 )             24.2     12-02    130<L>  |  97  |  27.3<H>  ----------------------------<  110<H>  4.5   |  21.0<L>  |  0.95    Ca    8.5      02 Dec 2023 07:46  Phos  2.9     12-02  Mg     1.8     12-02    TPro  6.3<L>  /  Alb  3.1<L>  /  TBili  0.7  /  DBili  x   /  AST  16  /  ALT  14  /  AlkPhos  95  12-01    Urinalysis Basic - ( 02 Dec 2023 07:46 )    Color: x / Appearance: x / SG: x / pH: x  Gluc: 110 mg/dL / Ketone: x  / Bili: x / Urobili: x   Blood: x / Protein: x / Nitrite: x   Leuk Esterase: x / RBC: x / WBC x   Sq Epi: x / Non Sq Epi: x / Bacteria: x    12-01 @ 07:01  -  12-02 @ 07:00  --------------------------------------------------------  IN: 0 mL / OUT: 1025 mL / NET: -1025 mL      RADIOLOGY & ADDITIONAL TESTS:  CT Head No Cont (11.29.23 @ 08:58):  IMPRESSION:   Stable intracranial hemorrhages.    CT Head No Cont (11.28.23 @ 20:36):  IMPRESSION:   Stable intracranial hemorrhages.    CT Head No Cont (11.27.23 @ 10:32):  IMPRESSION:  Grossly stable CT examination of the head when compared prior imaging.    CT Head No Cont (11.26.23 @ 21:59):  IMPRESSION:   Overall stable follow-up CT exam when compared with the most   recent prior CT study.

## 2023-12-03 LAB
ANION GAP SERPL CALC-SCNC: 10 MMOL/L — SIGNIFICANT CHANGE UP (ref 5–17)
ANION GAP SERPL CALC-SCNC: 10 MMOL/L — SIGNIFICANT CHANGE UP (ref 5–17)
ANION GAP SERPL CALC-SCNC: 12 MMOL/L — SIGNIFICANT CHANGE UP (ref 5–17)
ANION GAP SERPL CALC-SCNC: 12 MMOL/L — SIGNIFICANT CHANGE UP (ref 5–17)
BUN SERPL-MCNC: 26.3 MG/DL — HIGH (ref 8–20)
BUN SERPL-MCNC: 26.3 MG/DL — HIGH (ref 8–20)
BUN SERPL-MCNC: 26.9 MG/DL — HIGH (ref 8–20)
BUN SERPL-MCNC: 26.9 MG/DL — HIGH (ref 8–20)
CALCIUM SERPL-MCNC: 8.5 MG/DL — SIGNIFICANT CHANGE UP (ref 8.4–10.5)
CALCIUM SERPL-MCNC: 8.5 MG/DL — SIGNIFICANT CHANGE UP (ref 8.4–10.5)
CALCIUM SERPL-MCNC: 8.8 MG/DL — SIGNIFICANT CHANGE UP (ref 8.4–10.5)
CALCIUM SERPL-MCNC: 8.8 MG/DL — SIGNIFICANT CHANGE UP (ref 8.4–10.5)
CHLORIDE SERPL-SCNC: 100 MMOL/L — SIGNIFICANT CHANGE UP (ref 96–108)
CHLORIDE SERPL-SCNC: 100 MMOL/L — SIGNIFICANT CHANGE UP (ref 96–108)
CHLORIDE SERPL-SCNC: 98 MMOL/L — SIGNIFICANT CHANGE UP (ref 96–108)
CHLORIDE SERPL-SCNC: 98 MMOL/L — SIGNIFICANT CHANGE UP (ref 96–108)
CO2 SERPL-SCNC: 22 MMOL/L — SIGNIFICANT CHANGE UP (ref 22–29)
CREAT SERPL-MCNC: 1.11 MG/DL — SIGNIFICANT CHANGE UP (ref 0.5–1.3)
CREAT SERPL-MCNC: 1.11 MG/DL — SIGNIFICANT CHANGE UP (ref 0.5–1.3)
CREAT SERPL-MCNC: 1.14 MG/DL — SIGNIFICANT CHANGE UP (ref 0.5–1.3)
CREAT SERPL-MCNC: 1.14 MG/DL — SIGNIFICANT CHANGE UP (ref 0.5–1.3)
EGFR: 64 ML/MIN/1.73M2 — SIGNIFICANT CHANGE UP
EGFR: 64 ML/MIN/1.73M2 — SIGNIFICANT CHANGE UP
EGFR: 66 ML/MIN/1.73M2 — SIGNIFICANT CHANGE UP
EGFR: 66 ML/MIN/1.73M2 — SIGNIFICANT CHANGE UP
GLUCOSE SERPL-MCNC: 117 MG/DL — HIGH (ref 70–99)
GLUCOSE SERPL-MCNC: 117 MG/DL — HIGH (ref 70–99)
GLUCOSE SERPL-MCNC: 142 MG/DL — HIGH (ref 70–99)
GLUCOSE SERPL-MCNC: 142 MG/DL — HIGH (ref 70–99)
HCT VFR BLD CALC: 25.1 % — LOW (ref 39–50)
HCT VFR BLD CALC: 25.1 % — LOW (ref 39–50)
HGB BLD-MCNC: 8.6 G/DL — LOW (ref 13–17)
HGB BLD-MCNC: 8.6 G/DL — LOW (ref 13–17)
MCHC RBC-ENTMCNC: 31.3 PG — SIGNIFICANT CHANGE UP (ref 27–34)
MCHC RBC-ENTMCNC: 31.3 PG — SIGNIFICANT CHANGE UP (ref 27–34)
MCHC RBC-ENTMCNC: 34.3 GM/DL — SIGNIFICANT CHANGE UP (ref 32–36)
MCHC RBC-ENTMCNC: 34.3 GM/DL — SIGNIFICANT CHANGE UP (ref 32–36)
MCV RBC AUTO: 91.3 FL — SIGNIFICANT CHANGE UP (ref 80–100)
MCV RBC AUTO: 91.3 FL — SIGNIFICANT CHANGE UP (ref 80–100)
PLATELET # BLD AUTO: 267 K/UL — SIGNIFICANT CHANGE UP (ref 150–400)
PLATELET # BLD AUTO: 267 K/UL — SIGNIFICANT CHANGE UP (ref 150–400)
POTASSIUM SERPL-MCNC: 3.9 MMOL/L — SIGNIFICANT CHANGE UP (ref 3.5–5.3)
POTASSIUM SERPL-MCNC: 3.9 MMOL/L — SIGNIFICANT CHANGE UP (ref 3.5–5.3)
POTASSIUM SERPL-MCNC: 4.2 MMOL/L — SIGNIFICANT CHANGE UP (ref 3.5–5.3)
POTASSIUM SERPL-MCNC: 4.2 MMOL/L — SIGNIFICANT CHANGE UP (ref 3.5–5.3)
POTASSIUM SERPL-SCNC: 3.9 MMOL/L — SIGNIFICANT CHANGE UP (ref 3.5–5.3)
POTASSIUM SERPL-SCNC: 3.9 MMOL/L — SIGNIFICANT CHANGE UP (ref 3.5–5.3)
POTASSIUM SERPL-SCNC: 4.2 MMOL/L — SIGNIFICANT CHANGE UP (ref 3.5–5.3)
POTASSIUM SERPL-SCNC: 4.2 MMOL/L — SIGNIFICANT CHANGE UP (ref 3.5–5.3)
RBC # BLD: 2.75 M/UL — LOW (ref 4.2–5.8)
RBC # BLD: 2.75 M/UL — LOW (ref 4.2–5.8)
RBC # FLD: 13.3 % — SIGNIFICANT CHANGE UP (ref 10.3–14.5)
RBC # FLD: 13.3 % — SIGNIFICANT CHANGE UP (ref 10.3–14.5)
SODIUM SERPL-SCNC: 132 MMOL/L — LOW (ref 135–145)
WBC # BLD: 9.41 K/UL — SIGNIFICANT CHANGE UP (ref 3.8–10.5)
WBC # BLD: 9.41 K/UL — SIGNIFICANT CHANGE UP (ref 3.8–10.5)
WBC # FLD AUTO: 9.41 K/UL — SIGNIFICANT CHANGE UP (ref 3.8–10.5)
WBC # FLD AUTO: 9.41 K/UL — SIGNIFICANT CHANGE UP (ref 3.8–10.5)

## 2023-12-03 PROCEDURE — 99232 SBSQ HOSP IP/OBS MODERATE 35: CPT

## 2023-12-03 RX ADMIN — Medication 6 MILLIGRAM(S): at 22:18

## 2023-12-03 RX ADMIN — Medication 60 MILLIGRAM(S): at 05:56

## 2023-12-03 RX ADMIN — CHLORHEXIDINE GLUCONATE 1 APPLICATION(S): 213 SOLUTION TOPICAL at 07:06

## 2023-12-03 RX ADMIN — Medication 50 MILLIGRAM(S): at 12:50

## 2023-12-03 RX ADMIN — ATORVASTATIN CALCIUM 80 MILLIGRAM(S): 80 TABLET, FILM COATED ORAL at 22:17

## 2023-12-03 RX ADMIN — MEMANTINE HYDROCHLORIDE 5 MILLIGRAM(S): 10 TABLET ORAL at 05:56

## 2023-12-03 RX ADMIN — MODAFINIL 100 MILLIGRAM(S): 200 TABLET ORAL at 12:51

## 2023-12-03 RX ADMIN — Medication 50 MILLIGRAM(S): at 00:34

## 2023-12-03 RX ADMIN — Medication 1 TABLET(S): at 12:51

## 2023-12-03 RX ADMIN — SENNA PLUS 2 TABLET(S): 8.6 TABLET ORAL at 22:18

## 2023-12-03 RX ADMIN — Medication 60 MILLIGRAM(S): at 18:34

## 2023-12-03 RX ADMIN — Medication 1 MILLIGRAM(S): at 12:51

## 2023-12-03 RX ADMIN — APIXABAN 5 MILLIGRAM(S): 2.5 TABLET, FILM COATED ORAL at 18:34

## 2023-12-03 RX ADMIN — APIXABAN 5 MILLIGRAM(S): 2.5 TABLET, FILM COATED ORAL at 05:56

## 2023-12-03 RX ADMIN — MEMANTINE HYDROCHLORIDE 5 MILLIGRAM(S): 10 TABLET ORAL at 18:34

## 2023-12-03 NOTE — PROGRESS NOTE ADULT - ASSESSMENT
83M with HTN, CAD s/p PCO, RCC s/p left nephrectomy, bladder Ca, BPH, CHF, Vertigo, HLD, AAA s/p EVAR, parox Afib, h/o HIT, Alzheimers transferred s/p fall with Rt frontal IPH. Initially had hemicraniectomy course complicated by RVR, asp PNA, hypoxic resp failure, LUE DVT and questionable increase in AAA size. Started on AC with waxing/waning mental status prompting head imaging with evidence of new bleed. Now downgrade to Medicine.     IPH -s/p Rt hemicraniotomy   -likely sec to AC  for DVT/Afib with demonstration of new bleed  -Q2 neurochecks  -repeat CTH 12/02/23,stable  -SBP goal normotensive 100-160  -Continue amantadine, memantine   -Cranioplasty planning for likely OR next Thursday 12/7 tentatively. HOLD AC starting 12/4 night.  -Strict BP parameters (maintain SBP < 160, avoid rapid fluctuations)  -Continue modified diet   -Helmet to be worn when OOB when OOB      Parox Afib/ HTN/HLD   -Continue Metoprolol/diltiazem BID  -Continue Statin   -Labetalol / Hydralazine PRN   -Continue  eliquis until 12/4 per NSx       Questionable veg on TTE - discussed with iD- recommends WHIT  -family is hesitant discussed with wife - await call back from daughter  -f/u with Cardiology to determine if plan for WHIT     Acute DVT   -Continue AC as above, avoid heparin products 2/2 h/o HIT    Normocytic Anemia   Stools visualized. Normal color, no melena / evidence of GI blood loss  Repeat in am along with T&S,  transfuse to goal > 8 given h/o CAD    AAA s/p EVAR   Imaging reviewed by Vascular surgery  No intervention planned at present  Recommended obtaining prior imaging from pts Vascular surgeon (Dr Heredia for comparison)    DONNIE- resolved   Suspect may have CKD II  Monitor and renally dose all medications   hall maintained, TOV when more ambulatory/ mental status improved     Acute hypoxic resp failure 2/2 suspected asp PNA   Resolved   s/p completion of IV Abx   ID recs reviewed     83M with HTN, CAD s/p PCO, RCC s/p left nephrectomy, bladder Ca, BPH, CHF, Vertigo, HLD, AAA s/p EVAR, parox Afib, h/o HIT, Alzheimer transferred s/p fall with Rt frontal IPH. Initially had hemicraniectomy course complicated by RVR, asp PNA, hypoxic resp failure, LUE DVT and questionable increase in AAA size. Started on AC with waxing/waning mental status prompting head imaging with evidence of new bleed. Now downgrade to Medicine.     IPH -s/p Rt hemicraniotomy   -likely sec to AC  for DVT/Afib with demonstration of new bleed  -Q2 neuro checks  -repeat CTH 12/02/23,stable  -SBP goal normotensive 100-160  -Continue amantadine, memantine   -Cranioplasty planning for likely OR next Thursday 12/7 tentatively. HOLD AC starting 12/4 night.  -Strict BP parameters (maintain SBP < 160, avoid rapid fluctuations)  -Continue modified diet   -Helmet to be worn when OOB when OOB      Parox Afib/ HTN/HLD   -Continue Metoprolol/diltiazem BID  -Continue Statin   -Labetalol / Hydralazine PRN   -Continue  eliquis until 12/4 per NSx       Questionable veg on TTE - discussed with iD- recommends WHIT  -family is hesitant discussed with wife - await call back from daughter  -f/u with Cardiology to determine if plan for WHIT     Acute DVT   -Continue AC as above, avoid heparin products 2/2 h/o HIT    Normocytic Anemia   Stools visualized. Normal color, no melena / evidence of GI blood loss  Repeat in am along with T&S,  transfuse to goal > 8 given h/o CAD    AAA s/p EVAR   Imaging reviewed by Vascular surgery  No intervention planned at present  Recommended obtaining prior imaging from pts Vascular surgeon (Dr Heredia for comparison)    DONNIE- resolved   Suspect may have CKD II  Monitor and renally dose all medications   hall maintained, TOV when more ambulatory/ mental status improved     Acute hypoxic resp failure 2/2 suspected asp PNA   Resolved   s/p completion of IV Abx   ID recs reviewed    spoke with girlfriend at bedside  dw rn

## 2023-12-03 NOTE — PROGRESS NOTE ADULT - SUBJECTIVE AND OBJECTIVE BOX
Patient is a 83y old  Male who presents with a chief complaint of s/p unwitnessed fall with head strike (03 Dec 2023 08:24)    Pt is calm. no acute change  Denies cp, headache ,sob  REVIEW OF SYSTEMS: All systems are reviewed and found to be negative except above---not reliable    MEDICATIONS  (STANDING):  apixaban 5 milliGRAM(s) Oral every 12 hours  atorvastatin 80 milliGRAM(s) Oral at bedtime  chlorhexidine 2% Cloths 1 Application(s) Topical <User Schedule>  diltiazem    Tablet 60 milliGRAM(s) Oral <User Schedule>  folic acid 1 milliGRAM(s) Oral daily  melatonin 6 milliGRAM(s) Oral at bedtime  memantine 5 milliGRAM(s) Oral two times a day  metoprolol tartrate 50 milliGRAM(s) Oral <User Schedule>  modafinil 100 milliGRAM(s) Oral daily  Nephro-roma 1 Tablet(s) Oral daily  polyethylene glycol 3350 17 Gram(s) Oral daily  senna 2 Tablet(s) Oral at bedtime  tamsulosin 0.8 milliGRAM(s) Oral at bedtime    MEDICATIONS  (PRN):  acetaminophen     Tablet .. 650 milliGRAM(s) Oral every 6 hours PRN Temp greater or equal to 38C (100.4F), Mild Pain (1 - 3)  hydrALAZINE Injectable 10 milliGRAM(s) IV Push every 2 hours PRN SBP>160  labetalol Injectable 10 milliGRAM(s) IV Push every 2 hours PRN SBP>160  QUEtiapine 12.5 milliGRAM(s) Oral at bedtime PRN delirium      CAPILLARY BLOOD GLUCOSE        I&O's Summary    02 Dec 2023 07:01  -  03 Dec 2023 07:00  --------------------------------------------------------  IN: 0 mL / OUT: 2000 mL / NET: -2000 mL        PHYSICAL EXAM:  Vital Signs Last 24 Hrs  T(C): 37.2 (03 Dec 2023 07:42), Max: 37.3 (02 Dec 2023 16:11)  T(F): 99 (03 Dec 2023 07:42), Max: 99.1 (02 Dec 2023 16:11)  HR: 90 (03 Dec 2023 10:00) (75 - 106)  BP: 112/53 (03 Dec 2023 10:00) (112/53 - 145/76)  BP(mean): 66 (03 Dec 2023 10:00) (66 - 88)  RR: 18 (03 Dec 2023 10:00) (16 - 21)  SpO2: 96% (03 Dec 2023 10:00) (95% - 100%)    Parameters below as of 03 Dec 2023 10:00  Patient On (Oxygen Delivery Method): room air        CONSTITUTIONAL: NAD,  EYES: PERRLA; conjunctiva and sclera clear  ENMT: Moist oral mucosa,   RESPIRATORY: Normal respiratory effort; lungs are clear to auscultation bilaterally  CARDIOVASCULAR: Regular rate and rhythm, normal S1 and S2, no murmur   EXTS: No lower extremity edema; Peripheral pulses are 2+ bilaterally  ABDOMEN: Nontender to palpation, normoactive bowel sounds, no rebound/guarding;   MUSCLOSKELETAL; no joint swelling or tenderness to palpation  PSYCH:calm  NEUROLOGY: A+O to person, not to place, and time; moves rt toes      LABS:                        8.6    9.41  )-----------( 267      ( 03 Dec 2023 07:06 )             25.1     12-03    132<L>  |  98  |  26.9<H>  ----------------------------<  142<H>  3.9   |  22.0  |  1.14    Ca    8.5      03 Dec 2023 11:39  Phos  2.9     12-02  Mg     1.8     12-02            Urinalysis Basic - ( 03 Dec 2023 11:39 )    Color: x / Appearance: x / SG: x / pH: x  Gluc: 142 mg/dL / Ketone: x  / Bili: x / Urobili: x   Blood: x / Protein: x / Nitrite: x   Leuk Esterase: x / RBC: x / WBC x   Sq Epi: x / Non Sq Epi: x / Bacteria: x          RADIOLOGY & ADDITIONAL TESTS:  Results Reviewed:

## 2023-12-03 NOTE — PROGRESS NOTE ADULT - ASSESSMENT
83M w/ PMHS of HTN, CAD s/p stents, RCC s/p L nephrectomy, bladder CA, BPH, CHF, LBBB, vertigo, HLD, 5.5cm AAA without rupture post EVAR, paroxysmal A-fib on Eliquis, Plavix, and aspirin, early stage Alzheimer dementia transferred from [facility] 11/10/23 s/p unwitnessed fall with head strike at home found by wife on floor at 8am. CT head at [place] showed R frontal IPH, SDH, SAH at 9:00. s/p R craniectomy for ICH 11/10. Hospital course complicated by Klebsiella PNA, hypernatremia, lethargy, LUE DVT on Eliquis. Downgraded to stepdown on 11/23, re-upgraded to NSICU on 11/26 for new acute anterior falx SDH. Downgraded back to floor on 11/28, stable.      Plan  - Imaging reviewed   - Q2 hour neuro checks w/ protected sleep   - Helmet to be worn when OOB   - Amantadine 150mg QAM  - Memantine 5mg BID   - Pain control PRN, avoid oversedation  - STAT CTH for any decline in neuro exam   - SBP goal normotensive 100-160  - Cranioplasty planning, flap remains full, CTH done and sent to [email or web address] rep  - b/l SCDs, Eliquis 5mg BID   - OK to restart AC from nsx standpoint (Eliquis 5BID recommended by pharmacy instead of full dose fondaparinux due to renal function). Cranioplasty planning for likely OR next Thursday 12/7 tentatively. HOLD AC starting 12/4 night. D/w Dr. Larios and Dr. Diego. Will obtain CTH 12/2 AM to assess for stability s/p starting Eliquis  - Further medical management/supportive care per primary team   - D/w Dr. Styles 83M w/ PMHS of HTN, CAD s/p stents, RCC s/p L nephrectomy, bladder CA, BPH, CHF, LBBB, vertigo, HLD, 5.5cm AAA without rupture post EVAR, paroxysmal A-fib on Eliquis, Plavix, and aspirin, early stage Alzheimer dementia transferred from [facility] 11/10/23 s/p unwitnessed fall with head strike at home found by wife on floor at 8am. CT head at [place] showed R frontal IPH, SDH, SAH at 9:00. s/p R craniectomy for ICH 11/10. Hospital course complicated by Klebsiella PNA, hypernatremia, lethargy, LUE DVT on Eliquis. Downgraded to stepdown on 11/23, re-upgraded to NSICU on 11/26 for new acute anterior falx SDH. Downgraded back to floor on 11/28, stable.      Plan  - Imaging reviewed   - Q2 hour neuro checks w/ protected sleep   - Helmet to be worn when OOB   - Amantadine 150mg QAM  - Memantine 5mg BID   - Pain control PRN, avoid oversedation  - STAT CTH for any decline in neuro exam   - SBP goal normotensive 100-160  - Cranioplasty planning, flap remains full, CTH done and sent to [employer] rep  - b/l SCDs, Eliquis 5mg BID   - OK to restart AC from nsx standpoint (Eliquis 5BID recommended by pharmacy instead of full dose fondaparinux due to renal function). Cranioplasty planning for likely OR next Thursday 12/7 tentatively. HOLD AC starting 12/4 night. D/w Dr. Larios and Dr. Diego. Will obtain CTH 12/2 AM to assess for stability s/p starting Eliquis  - Further medical management/supportive care per primary team   - D/w Dr. Styles 83M w/ PMHS of HTN, CAD s/p stents, RCC s/p L nephrectomy, bladder CA, BPH, CHF, LBBB, vertigo, HLD, 5.5cm AAA without rupture post EVAR, paroxysmal A-fib on Eliquis, Plavix, and aspirin, early stage Alzheimer dementia transferred from [facility] 11/10/23 s/p unwitnessed fall with head strike at home found by wife on floor at 8am. CT head at [place] showed R frontal IPH, SDH, SAH at 9:00. s/p R craniectomy for ICH 11/10. Hospital course complicated by Klebsiella PNA, hypernatremia, lethargy, LUE DVT on Eliquis. Downgraded to stepdown on 11/23, re-upgraded to NSICU on 11/26 for new acute anterior falx SDH. Downgraded back to floor on 11/28, stable.      Plan  - Imaging reviewed   - Q2 hour neuro checks w/ protected sleep   - Helmet to be worn when OOB   - Amantadine 150mg QAM  - Memantine 5mg BID   - Pain control PRN, avoid oversedation  - STAT CTH for any decline in neuro exam   - SBP goal normotensive 100-160  - Cranioplasty planning, flap remains full, CTH done and sent to [employer] rep  - b/l SCDs, Eliquis 5mg BID   - OK to restart AC from nsx standpoint (Eliquis 5BID recommended by pharmacy instead of full dose fondaparinux due to renal function). Cranioplasty planning for likely OR next Thursday 12/7 tentatively. HOLD AC starting 12/4 night. D/w Dr. Larios and Dr. Diego. Will obtain CTH 12/2 AM to assess for stability s/p starting Eliquis  - Further medical management/supportive care per primary team   - D/w Dr. Styles

## 2023-12-03 NOTE — PROGRESS NOTE ADULT - SUBJECTIVE AND OBJECTIVE BOX
INTERVAL HPI/OVERNIGHT EVENTS:  83M w/ PMHX of HTN, CAD s/p stents, RCC s/p L nephrectomy, bladder CA, BPH, CHF, LBBB, vertigo, HLD, 5.5cm AAA without rupture post EVAR, paroxysmal A-fib on Eliquis, Plavix, and aspirin, early stage Alzheimer dementia transferred from [facility] 11/10/23 s/p unwitnessed fall with head strike at home found by wife on floor at 8am. CT head at [place] showed R frontal IPH, SDH, SAH at 9:00. s/p R craniectomy for ICH 11/10. Hospital course complicated by Klebsiella PNA, hypernatremia, lethargy, LUE DVT on Eliquis. Downgraded to stepdown on 11/23, re-upgraded to NSICU on 11/26 for new acute anterior falx SDH. Downgraded back to floor on 11/28, stable.  Patient seen and examined this morning. Discussed plan w/ patient and wife. Following commands briskly. Wide awake.     Vital Signs Last 24 Hrs  T(C): 36.7 (03 Dec 2023 13:08), Max: 37.3 (02 Dec 2023 16:11)  T(F): 98 (03 Dec 2023 13:08), Max: 99.1 (02 Dec 2023 16:11)  HR: 90 (03 Dec 2023 10:00) (75 - 106)  BP: 112/53 (03 Dec 2023 10:00) (112/53 - 145/76)  BP(mean): 66 (03 Dec 2023 10:00) (66 - 88)  RR: 18 (03 Dec 2023 10:00) (16 - 21)  SpO2: 96% (03 Dec 2023 10:00) (95% - 100%)  Parameters below as of 03 Dec 2023 10:00  Patient On (Oxygen Delivery Method): room air    PHYSICAL EXAM:  GENERAL: NAD, well groomed  HEAD: S/p R craniectomy, incision site C/D/I with no active drainage/bleeding noted, flap full but soft   MENTAL STATUS: AAO x2 (person, hospital with choices, knows birthday) Awake; Opens eyes to spontanesouly; Minimally conversant requiring encourgament; Following commands on R side   CRANIAL NERVES: PERRL 4mm b/l. EOMI, no gaze deviation. Hearing/speech grossly intact   MOTOR: RUE 5/5; LUE weak hand ; RLE 4/5; LLE WD  SENSATION: grossly intact to light touch on R side and noxious stimuli on L side throughout   RESP: Nonlabored breaths    LABS:               8.6    9.41  )-----------( 267      ( 03 Dec 2023 07:06 )             25.1     12-03    132<L>  |  98  |  26.9<H>  ----------------------------<  142<H>  3.9   |  22.0  |  1.14    Ca    8.5      03 Dec 2023 11:39  Phos  2.9     12-02  Mg     1.8     12-02    Urinalysis Basic - ( 03 Dec 2023 11:39 )    Color: x / Appearance: x / SG: x / pH: x  Gluc: 142 mg/dL / Ketone: x  / Bili: x / Urobili: x   Blood: x / Protein: x / Nitrite: x   Leuk Esterase: x / RBC: x / WBC x   Sq Epi: x / Non Sq Epi: x / Bacteria: x    12-02 @ 07:01  -  12-03 @ 07:00  --------------------------------------------------------  IN: 0 mL / OUT: 2000 mL / NET: -2000 mL      RADIOLOGY & ADDITIONAL TESTS:  CT Head No Cont (12.02.23 @ 05:10)  IMPRESSION:    Right hemicraniectomy. Right subdural fluid attenuation   collection as seen on the prior. Decreased conspicuity to regions of   acute hemorrhage in the right high frontal parasagittal region as well as   stable appearance to hemorrhage in the right insular posterior frontal   region in association with lucency as seen on the prior study    CT Head No Cont (11.29.23 @ 08:58):  IMPRESSION:   Stable intracranial hemorrhages.    CT Head No Cont (11.28.23 @ 20:36):  IMPRESSION:   Stable intracranial hemorrhages.    CT Head No Cont (11.27.23 @ 10:32):  IMPRESSION:  Grossly stable CT examination of the head when compared prior imaging.    CT Head No Cont (11.26.23 @ 21:59):  IMPRESSION:   Overall stable follow-up CT exam when compared with the most   recent prior CT study. INTERVAL HPI/OVERNIGHT EVENTS:  83M w/ PMHX of HTN, CAD s/p stents, RCC s/p L nephrectomy, bladder CA, BPH, CHF, LBBB, vertigo, HLD, 5.5cm AAA without rupture post EVAR, paroxysmal A-fib on Eliquis, Plavix, and aspirin, early stage Alzheimer dementia transferred from [facility] 11/10/23 s/p unwitnessed fall with head strike at home found by wife on floor at 8am. CT head at [place] showed R frontal IPH, SDH, SAH at 9:00. s/p R craniectomy for ICH 11/10. Hospital course complicated by Klebsiella PNA, hypernatremia, lethargy, LUE DVT on Eliquis. Downgraded to stepdown on 11/23, re-upgraded to NSICU on 11/26 for new acute anterior falx SDH. Downgraded back to floor on 11/28, stable.  Patient seen and examined this morning. Discussed plan w/ patient and wife. Following commands briskly. Wide awake.     Vital Signs Last 24 Hrs  T(C): 36.7 (03 Dec 2023 13:08), Max: 37.3 (02 Dec 2023 16:11)  T(F): 98 (03 Dec 2023 13:08), Max: 99.1 (02 Dec 2023 16:11)  HR: 90 (03 Dec 2023 10:00) (75 - 106)  BP: 112/53 (03 Dec 2023 10:00) (112/53 - 145/76)  BP(mean): 66 (03 Dec 2023 10:00) (66 - 88)  RR: 18 (03 Dec 2023 10:00) (16 - 21)  SpO2: 96% (03 Dec 2023 10:00) (95% - 100%)  Parameters below as of 03 Dec 2023 10:00  Patient On (Oxygen Delivery Method): room air    PHYSICAL EXAM:  GENERAL: NAD, well groomed  HEAD: S/p R craniectomy, incision site C/D/I with no active drainage/bleeding noted, flap full but soft   MENTAL STATUS: AAO x2 (person, hospital with choices, knows birthday) Awake; Opens eyes to spontanesouly; Minimally conversant requiring encourgament; Following commands on R side   CRANIAL NERVES: PERRL 4mm b/l. EOMI, no gaze deviation. Hearing/speech grossly intact   MOTOR: RUE 5/5; LUE weak hand ; RLE 4/5; LLE WD  SENSATION: grossly intact to light touch on R side and noxious stimuli on L side throughout   RESP: Nonlabored breaths    LABS:               8.6    9.41  )-----------( 267      ( 03 Dec 2023 07:06 )             25.1     12-03    132<L>  |  98  |  26.9<H>  ----------------------------<  142<H>  3.9   |  22.0  |  1.14    Ca    8.5      03 Dec 2023 11:39  Phos  2.9     12-02  Mg     1.8     12-02    Urinalysis Basic - ( 03 Dec 2023 11:39 )    Color: x / Appearance: x / SG: x / pH: x  Gluc: 142 mg/dL / Ketone: x  / Bili: x / Urobili: x   Blood: x / Protein: x / Nitrite: x   Leuk Esterase: x / RBC: x / WBC x   Sq Epi: x / Non Sq Epi: x / Bacteria: x    12-02 @ 07:01  -  12-03 @ 07:00  --------------------------------------------------------  IN: 0 mL / OUT: 2000 mL / NET: -2000 mL      RADIOLOGY & ADDITIONAL TESTS:  CT Head No Cont (12.02.23 @ 05:10)  IMPRESSION:    Right hemicraniectomy. Right subdural fluid attenuation   collection as seen on the prior. Decreased conspicuity to regions of   acute hemorrhage in the right high frontal parasagittal region as well as   stable appearance to hemorrhage in the right insular posterior frontal   region in association with lucency as seen on the prior study    CT Head No Cont (11.29.23 @ 08:58):  IMPRESSION:   Stable intracranial hemorrhages.    CT Head No Cont (11.28.23 @ 20:36):  IMPRESSION:   Stable intracranial hemorrhages.    CT Head No Cont (11.27.23 @ 10:32):  IMPRESSION:  Grossly stable CT examination of the head when compared prior imaging.    CT Head No Cont (11.26.23 @ 21:59):  IMPRESSION:   Overall stable follow-up CT exam when compared with the most   recent prior CT study. INTERVAL HPI/OVERNIGHT EVENTS:  83M w/ PMHX of HTN, CAD s/p stents, RCC s/p L nephrectomy, bladder CA, BPH, CHF, LBBB, vertigo, HLD, 5.5cm AAA without rupture post EVAR, paroxysmal A-fib on Eliquis, Plavix, and aspirin, early stage Alzheimer dementia transferred from [facility] 11/10/23 s/p unwitnessed fall with head strike at home found by wife on floor at 8am. CT head at [place] showed R frontal IPH, SDH, SAH at 9:00. s/p R craniectomy for ICH 11/10. Hospital course complicated by Klebsiella PNA, hypernatremia, lethargy, LUE DVT on Eliquis. Downgraded to stepdown on 11/23, re-upgraded to NSICU on 11/26 for new acute anterior falx SDH. Downgraded back to floor on 11/28, stable.  Patient seen and examined this morning. Discussed plan w/ patient and wife. Following commands briskly. Wide awake.     Vital Signs Last 24 Hrs  T(C): 36.7 (03 Dec 2023 13:08), Max: 37.3 (02 Dec 2023 16:11)  T(F): 98 (03 Dec 2023 13:08), Max: 99.1 (02 Dec 2023 16:11)  HR: 90 (03 Dec 2023 10:00) (75 - 106)  BP: 112/53 (03 Dec 2023 10:00) (112/53 - 145/76)  BP(mean): 66 (03 Dec 2023 10:00) (66 - 88)  RR: 18 (03 Dec 2023 10:00) (16 - 21)  SpO2: 96% (03 Dec 2023 10:00) (95% - 100%)  Parameters below as of 03 Dec 2023 10:00  Patient On (Oxygen Delivery Method): room air    PHYSICAL EXAM:  GENERAL: NAD, well groomed  HEAD: S/p R craniectomy, incision site C/D/I with no active drainage/bleeding noted, flap full but soft   MENTAL STATUS: AAO x2 (person, hospital with choices, knows birthday) Awake; Opens eyes to spontanesouly; Minimally conversant requiring encourgament; Following commands on R side   CRANIAL NERVES: PERRL 4mm b/l. EOMI, no gaze deviation. Hearing/speech grossly intact   MOTOR: RUE 5/5; LUE weak hand ; RLE 4/5; LLE WD  SENSATION: grossly intact to light touch on R side and noxious stimuli on L side throughout   RESP: Nonlabored breaths    LABS:               8.6    9.41  )-----------( 267      ( 03 Dec 2023 07:06 )             25.1     12-03    132<L>  |  98  |  26.9<H>  ----------------------------<  142<H>  3.9   |  22.0  |  1.14    Ca    8.5      03 Dec 2023 11:39  Phos  2.9     12-02  Mg     1.8     12-02    Urinalysis Basic - ( 03 Dec 2023 11:39 )    Color: x / Appearance: x / SG: x / pH: x  Gluc: 142 mg/dL / Ketone: x  / Bili: x / Urobili: x   Blood: x / Protein: x / Nitrite: x   Leuk Esterase: x / RBC: x / WBC x   Sq Epi: x / Non Sq Epi: x / Bacteria: x    12-02 @ 07:01  -  12-03 @ 07:00  --------------------------------------------------------  IN: 0 mL / OUT: 2000 mL / NET: -2000 mL      RADIOLOGY & ADDITIONAL TESTS:  CT Head No Cont (12.02.23 @ 05:10)  IMPRESSION:    Right hemicraniectomy. Right subdural fluid attenuation   collection as seen on the prior. Decreased conspicuity to regions of   acute hemorrhage in the right high frontal parasagittal region as well as   stable appearance to hemorrhage in the right insular posterior frontal   region in association with lucency as seen on the prior study    CT Head No Cont (11.29.23 @ 08:58):  IMPRESSION:   Stable intracranial hemorrhages.    CT Head No Cont (11.28.23 @ 20:36):  IMPRESSION:   Stable intracranial hemorrhages.    CT Head No Cont (11.27.23 @ 10:32):  IMPRESSION:  Grossly stable CT examination of the head when compared prior imaging.    CT Head No Cont (11.26.23 @ 21:59):  IMPRESSION:   Overall stable follow-up CT exam when compared with the most   recent prior CT study.

## 2023-12-04 LAB
ANION GAP SERPL CALC-SCNC: 12 MMOL/L — SIGNIFICANT CHANGE UP (ref 5–17)
ANION GAP SERPL CALC-SCNC: 12 MMOL/L — SIGNIFICANT CHANGE UP (ref 5–17)
BUN SERPL-MCNC: 24 MG/DL — HIGH (ref 8–20)
BUN SERPL-MCNC: 24 MG/DL — HIGH (ref 8–20)
CALCIUM SERPL-MCNC: 8.9 MG/DL — SIGNIFICANT CHANGE UP (ref 8.4–10.5)
CALCIUM SERPL-MCNC: 8.9 MG/DL — SIGNIFICANT CHANGE UP (ref 8.4–10.5)
CHLORIDE SERPL-SCNC: 101 MMOL/L — SIGNIFICANT CHANGE UP (ref 96–108)
CHLORIDE SERPL-SCNC: 101 MMOL/L — SIGNIFICANT CHANGE UP (ref 96–108)
CO2 SERPL-SCNC: 22 MMOL/L — SIGNIFICANT CHANGE UP (ref 22–29)
CO2 SERPL-SCNC: 22 MMOL/L — SIGNIFICANT CHANGE UP (ref 22–29)
CREAT SERPL-MCNC: 1.04 MG/DL — SIGNIFICANT CHANGE UP (ref 0.5–1.3)
CREAT SERPL-MCNC: 1.04 MG/DL — SIGNIFICANT CHANGE UP (ref 0.5–1.3)
EGFR: 72 ML/MIN/1.73M2 — SIGNIFICANT CHANGE UP
EGFR: 72 ML/MIN/1.73M2 — SIGNIFICANT CHANGE UP
GLUCOSE SERPL-MCNC: 109 MG/DL — HIGH (ref 70–99)
GLUCOSE SERPL-MCNC: 109 MG/DL — HIGH (ref 70–99)
POTASSIUM SERPL-MCNC: 3.9 MMOL/L — SIGNIFICANT CHANGE UP (ref 3.5–5.3)
POTASSIUM SERPL-MCNC: 3.9 MMOL/L — SIGNIFICANT CHANGE UP (ref 3.5–5.3)
POTASSIUM SERPL-SCNC: 3.9 MMOL/L — SIGNIFICANT CHANGE UP (ref 3.5–5.3)
POTASSIUM SERPL-SCNC: 3.9 MMOL/L — SIGNIFICANT CHANGE UP (ref 3.5–5.3)
SODIUM SERPL-SCNC: 135 MMOL/L — SIGNIFICANT CHANGE UP (ref 135–145)
SODIUM SERPL-SCNC: 135 MMOL/L — SIGNIFICANT CHANGE UP (ref 135–145)

## 2023-12-04 PROCEDURE — 99232 SBSQ HOSP IP/OBS MODERATE 35: CPT

## 2023-12-04 PROCEDURE — 99233 SBSQ HOSP IP/OBS HIGH 50: CPT | Mod: GC

## 2023-12-04 PROCEDURE — 74230 X-RAY XM SWLNG FUNCJ C+: CPT | Mod: 26

## 2023-12-04 RX ORDER — DOXAZOSIN MESYLATE 4 MG
1 TABLET ORAL AT BEDTIME
Refills: 0 | Status: DISCONTINUED | OUTPATIENT
Start: 2023-12-04 | End: 2023-12-05

## 2023-12-04 RX ADMIN — MODAFINIL 100 MILLIGRAM(S): 200 TABLET ORAL at 12:53

## 2023-12-04 RX ADMIN — APIXABAN 5 MILLIGRAM(S): 2.5 TABLET, FILM COATED ORAL at 06:09

## 2023-12-04 RX ADMIN — SENNA PLUS 2 TABLET(S): 8.6 TABLET ORAL at 21:24

## 2023-12-04 RX ADMIN — Medication 50 MILLIGRAM(S): at 00:39

## 2023-12-04 RX ADMIN — Medication 1 MILLIGRAM(S): at 12:53

## 2023-12-04 RX ADMIN — Medication 60 MILLIGRAM(S): at 17:54

## 2023-12-04 RX ADMIN — Medication 60 MILLIGRAM(S): at 06:09

## 2023-12-04 RX ADMIN — ATORVASTATIN CALCIUM 80 MILLIGRAM(S): 80 TABLET, FILM COATED ORAL at 21:25

## 2023-12-04 RX ADMIN — Medication 50 MILLIGRAM(S): at 12:53

## 2023-12-04 RX ADMIN — CHLORHEXIDINE GLUCONATE 1 APPLICATION(S): 213 SOLUTION TOPICAL at 06:08

## 2023-12-04 RX ADMIN — Medication 1 TABLET(S): at 12:53

## 2023-12-04 RX ADMIN — Medication 6 MILLIGRAM(S): at 21:24

## 2023-12-04 NOTE — PROGRESS NOTE ADULT - SUBJECTIVE AND OBJECTIVE BOX
Patient is a 83y old  Male who presents with a chief complaint of s/p unwitnessed fall with head strike (04 Dec 2023 11:35)      No acute issues.No acute change.Pt denies any pain,headache  REVIEW OF SYSTEMS: All systems are reviewed and found to be negative except above not relaible for mental status    MEDICATIONS  (STANDING):  atorvastatin 80 milliGRAM(s) Oral at bedtime  chlorhexidine 2% Cloths 1 Application(s) Topical <User Schedule>  diltiazem    Tablet 60 milliGRAM(s) Oral <User Schedule>  doxazosin 1 milliGRAM(s) Oral at bedtime  folic acid 1 milliGRAM(s) Oral daily  melatonin 6 milliGRAM(s) Oral at bedtime  memantine 5 milliGRAM(s) Oral two times a day  metoprolol tartrate 50 milliGRAM(s) Oral <User Schedule>  modafinil 100 milliGRAM(s) Oral daily  Nephro-roma 1 Tablet(s) Oral daily  polyethylene glycol 3350 17 Gram(s) Oral daily  senna 2 Tablet(s) Oral at bedtime    MEDICATIONS  (PRN):  acetaminophen     Tablet .. 650 milliGRAM(s) Oral every 6 hours PRN Temp greater or equal to 38C (100.4F), Mild Pain (1 - 3)  hydrALAZINE Injectable 10 milliGRAM(s) IV Push every 2 hours PRN SBP>160  labetalol Injectable 10 milliGRAM(s) IV Push every 2 hours PRN SBP>160  QUEtiapine 12.5 milliGRAM(s) Oral at bedtime PRN delirium      CAPILLARY BLOOD GLUCOSE        I&O's Summary    03 Dec 2023 07:01  -  04 Dec 2023 07:00  --------------------------------------------------------  IN: 595 mL / OUT: 350 mL / NET: 245 mL    04 Dec 2023 07:01  -  04 Dec 2023 13:41  --------------------------------------------------------  IN: 0 mL / OUT: 150 mL / NET: -150 mL        PHYSICAL EXAM:  Vital Signs Last 24 Hrs  T(C): 36.8 (04 Dec 2023 07:37), Max: 36.9 (03 Dec 2023 19:00)  T(F): 98.2 (04 Dec 2023 07:37), Max: 98.4 (03 Dec 2023 19:00)  HR: 82 (04 Dec 2023 10:00) (70 - 92)  BP: 110/60 (04 Dec 2023 10:00) (100/56 - 139/78)  BP(mean): 71 (04 Dec 2023 10:00) (66 - 102)  RR: 18 (04 Dec 2023 10:00) (15 - 20)  SpO2: 99% (04 Dec 2023 10:00) (96% - 99%)    Parameters below as of 04 Dec 2023 10:00  Patient On (Oxygen Delivery Method): room air        CONSTITUTIONAL: NAD,  EYES: PERRLA; conjunctiva and sclera clear  ENMT: Moist oral mucosa,   RESPIRATORY: Normal respiratory effort; lungs are clear to auscultation bilaterally  CARDIOVASCULAR: Regular rate and rhythm, normal S1 and S2, no murmur   EXTS: No lower extremity edema; Peripheral pulses are 2+ bilaterally  ABDOMEN: Nontender to palpation, normoactive bowel sounds, no rebound/guarding;   MUSCLOSKELETAL:    no joint swelling or tenderness to palpation  PSYCH: calm  NEUROLOGY: A+O to person, not to place, and time; no acute change      LABS:                        8.6    9.41  )-----------( 267      ( 03 Dec 2023 07:06 )             25.1     12-04    135  |  101  |  24.0<H>  ----------------------------<  109<H>  3.9   |  22.0  |  1.04    Ca    8.9      04 Dec 2023 05:26            Urinalysis Basic - ( 04 Dec 2023 05:26 )    Color: x / Appearance: x / SG: x / pH: x  Gluc: 109 mg/dL / Ketone: x  / Bili: x / Urobili: x   Blood: x / Protein: x / Nitrite: x   Leuk Esterase: x / RBC: x / WBC x   Sq Epi: x / Non Sq Epi: x / Bacteria: x          RADIOLOGY & ADDITIONAL TESTS:  Results Reviewed:

## 2023-12-04 NOTE — SWALLOW VFSS/MBS ASSESSMENT ADULT - ORAL PHASE
within functional limits/Uncontrolled bolus / spillover in jeff-pharynx/Uncontrolled bolus / spillover in hypopharynx Uncontrolled bolus / spillover in jeff-pharynx/Uncontrolled bolus / spillover in hypopharynx Delayed oral transit time/Reduced anterior - posterior transport/Residue in oral cavity/Uncontrolled bolus / spillover in jeff-pharynx

## 2023-12-04 NOTE — SWALLOW VFSS/MBS ASSESSMENT ADULT - ROSENBEK'S PENETRATION ASPIRATION SCALE
trace observed only on initial trial x 1/4; 1- no penetration or aspiration demonstrated w/remaining trials x 3/4/(3) contrast remains above the vocal cords, visible residue remains (penetration) (1) no aspiration, contrast does not enter airway following swallow; 3- penetration above cords without clearance during swallow/(5) contrast contacts vocal cords, visible residue remains (penetration)

## 2023-12-04 NOTE — PROGRESS NOTE ADULT - ASSESSMENT
83M w/ PMHS of HTN, CAD s/p stents, RCC s/p L nephrectomy, bladder CA, BPH, CHF, LBBB, vertigo, HLD, 5.5cm AAA without rupture post EVAR, paroxysmal A-fib on Eliquis, Plavix, and aspirin, early stage Alzheimer dementia transferred from [facility] 11/10/23 s/p unwitnessed fall with head strike at home found by wife on floor at 8am. CT head at [place] showed R frontal IPH, SDH, SAH at 9:00. s/p R craniectomy for ICH 11/10. Hospital course complicated by Klebsiella PNA, hypernatremia, lethargy, LUE DVT on Eliquis. Downgraded to stepdown on 11/23, re-upgraded to NSICU on 11/26 for new acute anterior falx SDH. Downgraded back to floor on 11/28, stable.      PLAN:    - Q2 hour neuro checks w/ protected sleep   - Helmet to be worn when OOB   - Amantadine 150mg QAM  - Memantine 5mg BID   - Pain control PRN, avoid oversedation  - STAT CTH for any decline in neuro exam   - SBP goal normotensive 100-160  - Cranioplasty planned for Thursday 12/7, flap remains full but soft, CTH done and sent to [facility], likely delivery on 12/6  - Will need to be pre-op'd on Wednesday, 12/6  - Will need medical clearance prior to OR  - b/l SCDs, Eliquis 5mg BID-Eliquis MUST be DISCONTINUED TONIGHT for planned OR on Thursday   - Further medical management/supportive care per primary team   - Discussed case w/ Dr. Larios 83M w/ PMHS of HTN, CAD s/p stents, RCC s/p L nephrectomy, bladder CA, BPH, CHF, LBBB, vertigo, HLD, 5.5cm AAA without rupture post EVAR, paroxysmal A-fib on Eliquis, Plavix, and aspirin, early stage Alzheimer dementia transferred from [facility] 11/10/23 s/p unwitnessed fall with head strike at home found by wife on floor at 8am. CT head at [place] showed R frontal IPH, SDH, SAH at 9:00. s/p R craniectomy for ICH 11/10. Hospital course complicated by Klebsiella PNA, hypernatremia, lethargy, LUE DVT on Eliquis. Downgraded to stepdown on 11/23, re-upgraded to NSICU on 11/26 for new acute anterior falx SDH. Downgraded back to floor on 11/28, stable.      PLAN:    - Q2 hour neuro checks w/ protected sleep   - Helmet to be worn when OOB   - Amantadine 150mg QAM  - Memantine 5mg BID   - Pain control PRN, avoid oversedation  - STAT CTH for any decline in neuro exam   - SBP goal normotensive 100-160  - Cranioplasty planned for Thursday 12/7, flap remains full but soft, CTH done and sent to [facility], likely delivery on 12/6  - Will need to be pre-op'd on Wednesday, 12/6  - Will need medical clearance prior to OR  - b/l SCDs, Eliquis 5mg BID-Eliquis MUST be DISCONTINUED TONIGHT for planned OR on Thursday   - Further medical management/supportive care per primary team   - Discussed case w/ Dr. Larios

## 2023-12-04 NOTE — SWALLOW VFSS/MBS ASSESSMENT ADULT - DIAGNOSTIC IMPRESSIONS
Pt presenting w/mild-moderate oropharyngeal dysphagia. Oral stage of swallow negatively impacted upon by cognitive limitation w/further oral musculature weakness resulting from acute brain injury. Oral stage marked by anterior & insufficient mastication w/moderately delayed A-P transfer of regular solids, min L anterior-lateral sulcus stasis observed post intake ultimately needing manual removal by SLP. Much improved & overall functional (though still mildly delayed) oral manipulation of easy to chew consistencies. Reduced oral containment from lingual weakness & reduced ROM, w/subsequent premature pharyngeal entry of trials noted variably between the jeff & hypopharynx - more consistently to the pyriforms w/liquids vs solids. No anterior loss observed across PO consumed on fluoro.   Pharyngeal phase of swallow __ Pt presenting w/mild-moderate oropharyngeal dysphagia. Oral stage of swallow negatively impacted upon by cognitive limitation w/further oral musculature weakness resulting from acute brain injury. Oral stage marked by anterior & insufficient mastication w/moderately delayed A-P transfer of regular solids, min L anterior-lateral sulcus stasis observed post intake ultimately needing manual removal by SLP. Much improved & overall functional (though still mildly delayed) oral manipulation of easy to chew consistencies. Reduced oral containment from lingual weakness & reduced ROM, w/subsequent premature pharyngeal entry of trials noted variably between the jeff & hypopharynx - more consistently to the pyriforms w/liquids vs solids. No anterior loss observed across PO consumed on fluoro.   Pharyngeal phase of swallow characterized by overall intact contractility & pharyngeal pressure generation, w/only trace vallecular stasis observed post intake across trials. Pt with reduced hyo-laryngeal elevation/excursion, incomplete epiglottic deflection & inadequate upper airway protection. Deep penetration above the cords during the swallow however ultimately contacting the cords following the swallow without clearance, w/small cup sip mildly thick liquids. Additional single instance of trace penetration above the cords during the swallow without clearance, in moderately thick liquids x 1/4 trials only (no contact w/cords). Remaining trials of moderately thick liquids x 3/4 with no airway entry. No further penetration visualized. Aspiration was NOT demonstrated across trials. Pt unable to execute compensatory postures in attempt to facilitate airway entry due to cognitive limitations.

## 2023-12-04 NOTE — CHART NOTE - NSCHARTNOTEFT_GEN_A_CORE
I have evaluated this patient and have determined that restraints are warranted to optimize medical care. Patient was assessed for current physical and psychological risk factors as well as special needs. There are no medical conditions or limitations that would place this patient at risk while in restraints.

## 2023-12-04 NOTE — PROGRESS NOTE ADULT - SUBJECTIVE AND OBJECTIVE BOX
[facility]   Cranioplasty 12/7?   VSS afebrile     FUNCTIONAL PROGRESS  SLP 11/26   Speech Language Pathology Recommendations: 1. Minced/moist diet w/ moderately thick liquids 2. 1:1 assist for PO3. Meds crushed in puree 4. STRICT aspiration precautions 5. upright for PO, slow rate, small bites/sips, alternate solids/liquids 6. Pt must be AWAKE/ALERT FOR PO 7. D/C PO diet w/ overt s/s of penetration/aspiration pending reconsult from this department 8. SLP to follow     PT 12/3   Bed Mobility  Bed Mobility Training Sit-to-Supine: maximum assist (25% patient effort);  2 person assist  Bed Mobility Training Supine-to-Sit: maximum assist (25% patient effort);  2 person assist  Bed Mobility Training Limitations: decreased ability to use arms for pushing/pulling;  decreased ability to use legs for bridging/pushing;  decreased flexibility;  decreased ROM;  decreased strength;  impaired balance;  cognitive, decreased safety awareness    Sit-Stand Transfer Training  Transfer Training Sit-to-Stand Transfer: maximum assist (25% patient effort);  2 person assist;  full weight-bearing   no device + helmet, left knee blocked  Transfer Training Stand-to-Sit Transfer: maximum assist (25% patient effort)  Sit-to-Stand Transfer Training Transfer Safety Analysis: decreased balance;  decreased cognition;  decreased flexibility;  decreased ROM;  decreased strength;  impaired balance    Therapeutic Exercise  Therapeutic Exercise Detail: Pt. worked on sitting balance and c-spine extension at EOB with right UE support and max verbal cues; min to max assist required. 5 mins     OT 11/28  Bathing Training:     · Level of [place]	dependent (less than 25% patients effort)    Upper Body Dressing Training:     · Level of [place]	dependent (less than 25% patients effort)    Lower Body Dressing Training:     · Level of [place]	dependent (less than 25% patients effort)    Toilet Hygiene Training:     · Level of [place]	dependent (less than 25% patients effort)    Grooming Training:     · Level of [place]	maximum assist (25% patients effort)      VITALS  T(C): 36.8 (12-04-23 @ 07:37), Max: 36.9 (12-03-23 @ 19:00)  HR: 84 (12-04-23 @ 06:00) (70 - 92)  BP: 139/66 (12-04-23 @ 06:00) (100/56 - 139/78)  RR: 17 (12-04-23 @ 06:00) (16 - 20)  SpO2: 98% (12-04-23 @ 06:00) (96% - 98%)  Wt(kg): --    MEDICATIONS   acetaminophen     Tablet .. 650 milliGRAM(s) every 6 hours PRN  atorvastatin 80 milliGRAM(s) at bedtime  chlorhexidine 2% Cloths 1 Application(s) <User Schedule>  diltiazem    Tablet 60 milliGRAM(s) <User Schedule>  folic acid 1 milliGRAM(s) daily  hydrALAZINE Injectable 10 milliGRAM(s) every 2 hours PRN  labetalol Injectable 10 milliGRAM(s) every 2 hours PRN  melatonin 6 milliGRAM(s) at bedtime  memantine 5 milliGRAM(s) two times a day  metoprolol tartrate 50 milliGRAM(s) <User Schedule>  modafinil 100 milliGRAM(s) daily  Nephro-roma 1 Tablet(s) daily  polyethylene glycol 3350 17 Gram(s) daily  QUEtiapine 12.5 milliGRAM(s) at bedtime PRN  senna 2 Tablet(s) at bedtime  tamsulosin 0.8 milliGRAM(s) at bedtime      RECENT LABS/IMAGING  - Reviewed Today                        8.6    9.41  )-----------( 267      ( 03 Dec 2023 07:06 )             25.1     12-04    135  |  101  |  24.0<H>  ----------------------------<  109<H>  3.9   |  22.0  |  1.04    Ca    8.9      04 Dec 2023 05:26        Urinalysis Basic - ( 04 Dec 2023 05:26 )    Color: x / Appearance: x / SG: x / pH: x  Gluc: 109 mg/dL / Ketone: x  / Bili: x / Urobili: x   Blood: x / Protein: x / Nitrite: x   Leuk Esterase: x / RBC: x / WBC x   Sq Epi: x / Non Sq Epi: x / Bacteria: x    [facility] 12/2  IMPRESSION:  Right hemicraniectomy. Right subdural fluid attenuation   collection as seen on the prior. Decreased conspicuity to regions of   acute hemorrhage in the right high frontal parasagittal region as well as   stable appearance to hemorrhage in the right insular posterior frontal   region in association with lucency as seen on the prior study    CTH 11/23 - Stable multicompartment intracranial hemorrhages. Stable right-sided subdural collection.    CTH 11/22 - Mixed attenuating subdural hematoma again seen with some   expected postoperative changes. Evolving area of parenchymal hemorrhage involving the right frontal   region.    CTH 11/16/23: Improved to stable multicompartment intracranial hemorrhages.   Right-sided subdural hygroma, larger in size.    US duplex BLE 11/16/23: No evidence of deep venous thrombosis in either lower extremity.    CXR 11/15/23: There is mild pulmonary venous congestion. Left retrocardiac/lower lobe   opacity could represent associated pneumonia.    MR Brain 11/13/23: RIGHT frontoparietal craniectomy with underlying   intracranial hemorrhage and edema within the RIGHT frontal lobe.   Overlying small RIGHT subdural hemorrhage is also unchanged. Scant   hemorrhage in the dependent portions of the BILATERAL lateral ventricles   and minimal subarachnoid hemorrhage seen in the BILATERAL frontal and   parietal lobes. Moderate periventricular and deep white matter ischemia   is noted. Encephalomalacia and gliosis seen in the anterior frontal lobes   bilaterally. Tiny acute lacunar infarction in the LEFT cerebellum and   tiny acute cortical infarction in the RIGHT parietal lobe. Restricted   diffusion also noted about the surgical cavity suggesting infarcted   parenchyma.    CTH 11/29: IMPRESSION: Stable intracranial hemorrhages.    ----------------------------------------------------------------------------------------  PHYSICAL EXAM  Constitutional - Awake   HEENT - +R crani   Chest - Breathing comfortably on NC   Cardiovascular - RRR  Abdomen - Soft   Extremities - No peripheral edema   Neurologic Exam -                    Cognitive - Intermittently follows commands       Communication - Mumbles      Cranial Nerves - No facial asymmetry      Motor -                    LEFT    UE - flaccid                     RIGHT UE - moves extremity spontaneously to pain                     LEFT    LE - moves extremity spontaneously to pain                     RIGHT LE - moves extremity spontaneously to pain      ----------------------------------------------------------------------------------------  ASSESSMENT/PLAN  83yMale with functional deficits after multicompartmental intracranial bleeding.    Traumatic ICH - Stable   Acute CVA - WHIT w/vegetations?    Alzheimer's Dementia - Namenda 5mg BID, Neuropsychology (Dr. Diallo) to follow    Wakefulness - Discontinued Amantadine 150mg daily, Started Provigil 100mg @0600   CAD, PAfib - Statin, Dilt, Metop   HTN - Hydralazine, labetalol   Dysphagia - Pureed/mod thick   Delirium - Melatonin 6mg qhs, Started Seroquel 12.5mg qhs PRN   LUE DVT - Apixaban   Dysphagia - Pureed/mod thick   DVT ppx - SCD, Apixaban   Rehab/Impaired mobility and function - Continuous hospitalization is crucial for managing the patient's acute medical issues (Traumatic ICH, delirium), which have significantly impacted their mobility, quality of life, and function. Rehabilitation recommendations will be based on the patient's functional progress and their ability to participate in and tolerate therapeutic interventions, which may change over time. Maintaining ongoing mobilization by the staff is imperative to prevent secondary medical complications and associated health issues related to debility.    On discussion with Neuropsychology and extensive chart review, the patient has a history of vascular dementia and questionable cognitive deficits prior to hospitalization. In addition, he is displaying increased confusion and sleep/wake cycle disorders due to prolonged hospital stay and ICU delirium. Given this, recommend discontinuing Amantadine 150mg @0600 and starting Provigil 100mg @0600. If delirium worsens during the evening, suggest using Seroquel 12.5mg qhs PRN to help regulate symptoms.     At this point in time, the patient will most likely benefit from HO[facility] placement given functional status. He is currently not a candidate for ACUTE inpatient rehabilitation and cannot tolerate 3h of PT/OT/SLP daily. PM&R will continue to follow.          [facility]   Cranioplasty 12/7?   VSS afebrile     FUNCTIONAL PROGRESS  SLP 11/26   Speech Language Pathology Recommendations: 1. Minced/moist diet w/ moderately thick liquids 2. 1:1 assist for PO3. Meds crushed in puree 4. STRICT aspiration precautions 5. upright for PO, slow rate, small bites/sips, alternate solids/liquids 6. Pt must be AWAKE/ALERT FOR PO 7. D/C PO diet w/ overt s/s of penetration/aspiration pending reconsult from this department 8. SLP to follow     PT 12/3   Bed Mobility  Bed Mobility Training Sit-to-Supine: maximum assist (25% patient effort);  2 person assist  Bed Mobility Training Supine-to-Sit: maximum assist (25% patient effort);  2 person assist  Bed Mobility Training Limitations: decreased ability to use arms for pushing/pulling;  decreased ability to use legs for bridging/pushing;  decreased flexibility;  decreased ROM;  decreased strength;  impaired balance;  cognitive, decreased safety awareness    Sit-Stand Transfer Training  Transfer Training Sit-to-Stand Transfer: maximum assist (25% patient effort);  2 person assist;  full weight-bearing   no device + helmet, left knee blocked  Transfer Training Stand-to-Sit Transfer: maximum assist (25% patient effort)  Sit-to-Stand Transfer Training Transfer Safety Analysis: decreased balance;  decreased cognition;  decreased flexibility;  decreased ROM;  decreased strength;  impaired balance    Therapeutic Exercise  Therapeutic Exercise Detail: Pt. worked on sitting balance and c-spine extension at EOB with right UE support and max verbal cues; min to max assist required. 5 mins     OT 11/28  Bathing Training:     · Level of [place]	dependent (less than 25% patients effort)    Upper Body Dressing Training:     · Level of [place]	dependent (less than 25% patients effort)    Lower Body Dressing Training:     · Level of [place]	dependent (less than 25% patients effort)    Toilet Hygiene Training:     · Level of [place]	dependent (less than 25% patients effort)    Grooming Training:     · Level of [place]	maximum assist (25% patients effort)      VITALS  T(C): 36.8 (12-04-23 @ 07:37), Max: 36.9 (12-03-23 @ 19:00)  HR: 84 (12-04-23 @ 06:00) (70 - 92)  BP: 139/66 (12-04-23 @ 06:00) (100/56 - 139/78)  RR: 17 (12-04-23 @ 06:00) (16 - 20)  SpO2: 98% (12-04-23 @ 06:00) (96% - 98%)  Wt(kg): --    MEDICATIONS   acetaminophen     Tablet .. 650 milliGRAM(s) every 6 hours PRN  atorvastatin 80 milliGRAM(s) at bedtime  chlorhexidine 2% Cloths 1 Application(s) <User Schedule>  diltiazem    Tablet 60 milliGRAM(s) <User Schedule>  folic acid 1 milliGRAM(s) daily  hydrALAZINE Injectable 10 milliGRAM(s) every 2 hours PRN  labetalol Injectable 10 milliGRAM(s) every 2 hours PRN  melatonin 6 milliGRAM(s) at bedtime  memantine 5 milliGRAM(s) two times a day  metoprolol tartrate 50 milliGRAM(s) <User Schedule>  modafinil 100 milliGRAM(s) daily  Nephro-roma 1 Tablet(s) daily  polyethylene glycol 3350 17 Gram(s) daily  QUEtiapine 12.5 milliGRAM(s) at bedtime PRN  senna 2 Tablet(s) at bedtime  tamsulosin 0.8 milliGRAM(s) at bedtime      RECENT LABS/IMAGING  - Reviewed Today                        8.6    9.41  )-----------( 267      ( 03 Dec 2023 07:06 )             25.1     12-04    135  |  101  |  24.0<H>  ----------------------------<  109<H>  3.9   |  22.0  |  1.04    Ca    8.9      04 Dec 2023 05:26        Urinalysis Basic - ( 04 Dec 2023 05:26 )    Color: x / Appearance: x / SG: x / pH: x  Gluc: 109 mg/dL / Ketone: x  / Bili: x / Urobili: x   Blood: x / Protein: x / Nitrite: x   Leuk Esterase: x / RBC: x / WBC x   Sq Epi: x / Non Sq Epi: x / Bacteria: x    [facility] 12/2  IMPRESSION:  Right hemicraniectomy. Right subdural fluid attenuation   collection as seen on the prior. Decreased conspicuity to regions of   acute hemorrhage in the right high frontal parasagittal region as well as   stable appearance to hemorrhage in the right insular posterior frontal   region in association with lucency as seen on the prior study    CTH 11/23 - Stable multicompartment intracranial hemorrhages. Stable right-sided subdural collection.    CTH 11/22 - Mixed attenuating subdural hematoma again seen with some   expected postoperative changes. Evolving area of parenchymal hemorrhage involving the right frontal   region.    CTH 11/16/23: Improved to stable multicompartment intracranial hemorrhages.   Right-sided subdural hygroma, larger in size.    US duplex BLE 11/16/23: No evidence of deep venous thrombosis in either lower extremity.    CXR 11/15/23: There is mild pulmonary venous congestion. Left retrocardiac/lower lobe   opacity could represent associated pneumonia.    MR Brain 11/13/23: RIGHT frontoparietal craniectomy with underlying   intracranial hemorrhage and edema within the RIGHT frontal lobe.   Overlying small RIGHT subdural hemorrhage is also unchanged. Scant   hemorrhage in the dependent portions of the BILATERAL lateral ventricles   and minimal subarachnoid hemorrhage seen in the BILATERAL frontal and   parietal lobes. Moderate periventricular and deep white matter ischemia   is noted. Encephalomalacia and gliosis seen in the anterior frontal lobes   bilaterally. Tiny acute lacunar infarction in the LEFT cerebellum and   tiny acute cortical infarction in the RIGHT parietal lobe. Restricted   diffusion also noted about the surgical cavity suggesting infarcted   parenchyma.    CTH 11/29: IMPRESSION: Stable intracranial hemorrhages.    ----------------------------------------------------------------------------------------  PHYSICAL EXAM  Constitutional - Awake   HEENT - +R crani   Chest - Breathing comfortably on NC   Cardiovascular - RRR  Abdomen - Soft   Extremities - No peripheral edema   Neurologic Exam -                    Cognitive - Intermittently follows commands       Communication - Mumbles      Cranial Nerves - No facial asymmetry      Motor -                    LEFT    UE - flaccid                     RIGHT UE - moves extremity spontaneously to pain                     LEFT    LE - moves extremity spontaneously to pain                     RIGHT LE - moves extremity spontaneously to pain      ----------------------------------------------------------------------------------------  ASSESSMENT/PLAN  83yMale with functional deficits after multicompartmental intracranial bleeding.    Traumatic ICH - Stable   Acute CVA - WHIT w/vegetations?    Alzheimer's Dementia - Namenda 5mg BID, Neuropsychology (Dr. Diallo) to follow    Wakefulness - Discontinued Amantadine 150mg daily, Started Provigil 100mg @0600   CAD, PAfib - Statin, Dilt, Metop   HTN - Hydralazine, labetalol   Dysphagia - Pureed/mod thick   Delirium - Melatonin 6mg qhs, Started Seroquel 12.5mg qhs PRN   LUE DVT - Apixaban   Dysphagia - Pureed/mod thick   DVT ppx - SCD, Apixaban   Rehab/Impaired mobility and function - Continuous hospitalization is crucial for managing the patient's acute medical issues (Traumatic ICH, delirium), which have significantly impacted their mobility, quality of life, and function. Rehabilitation recommendations will be based on the patient's functional progress and their ability to participate in and tolerate therapeutic interventions, which may change over time. Maintaining ongoing mobilization by the staff is imperative to prevent secondary medical complications and associated health issues related to debility.    On discussion with Neuropsychology and extensive chart review, the patient has a history of vascular dementia and questionable cognitive deficits prior to hospitalization. In addition, he is displaying increased confusion and sleep/wake cycle disorders due to prolonged hospital stay and ICU delirium. Given this, recommend discontinuing Amantadine 150mg @0600 and starting Provigil 100mg @0600. If delirium worsens during the evening, suggest using Seroquel 12.5mg qhs PRN to help regulate symptoms.     At this point in time, the patient will most likely benefit from [facility] placement given functional status. He is currently not a candidate for ACUTE inpatient rehabilitation and cannot tolerate 3h of PT/OT/SLP daily. PM&R will continue to follow.          Holding AC   Cranioplasty 12/7?   More awake and interactive, but still confused and fluctuating   Flaccid paralysis of the left UE   VSS afebrile     FUNCTIONAL PROGRESS  SLP 11/26   Speech Language Pathology Recommendations: 1. Minced/moist diet w/ moderately thick liquids 2. 1:1 assist for PO3. Meds crushed in puree 4. STRICT aspiration precautions 5. upright for PO, slow rate, small bites/sips, alternate solids/liquids 6. Pt must be AWAKE/ALERT FOR PO 7. D/C PO diet w/ overt s/s of penetration/aspiration pending reconsult from this department 8. SLP to follow     PT 12/3   Bed Mobility  Bed Mobility Training Sit-to-Supine: maximum assist (25% patient effort);  2 person assist  Bed Mobility Training Supine-to-Sit: maximum assist (25% patient effort);  2 person assist  Bed Mobility Training Limitations: decreased ability to use arms for pushing/pulling;  decreased ability to use legs for bridging/pushing;  decreased flexibility;  decreased ROM;  decreased strength;  impaired balance;  cognitive, decreased safety awareness    Sit-Stand Transfer Training  Transfer Training Sit-to-Stand Transfer: maximum assist (25% patient effort);  2 person assist;  full weight-bearing   no device + helmet, left knee blocked  Transfer Training Stand-to-Sit Transfer: maximum assist (25% patient effort)  Sit-to-Stand Transfer Training Transfer Safety Analysis: decreased balance;  decreased cognition;  decreased flexibility;  decreased ROM;  decreased strength;  impaired balance    Therapeutic Exercise  Therapeutic Exercise Detail: Pt. worked on sitting balance and c-spine extension at EOB with right UE support and max verbal cues; min to max assist required. 5 mins     OT 11/28  Bathing Training:     · Level of [place]	dependent (less than 25% patients effort)    Upper Body Dressing Training:     · Level of [place]	dependent (less than 25% patients effort)    Lower Body Dressing Training:     · Level of [place]	dependent (less than 25% patients effort)    Toilet Hygiene Training:     · Level of [place]	dependent (less than 25% patients effort)    Grooming Training:     · Level of [place]	maximum assist (25% patients effort)      VITALS  T(C): 36.8 (12-04-23 @ 07:37), Max: 36.9 (12-03-23 @ 19:00)  HR: 84 (12-04-23 @ 06:00) (70 - 92)  BP: 139/66 (12-04-23 @ 06:00) (100/56 - 139/78)  RR: 17 (12-04-23 @ 06:00) (16 - 20)  SpO2: 98% (12-04-23 @ 06:00) (96% - 98%)  Wt(kg): --    MEDICATIONS   acetaminophen     Tablet .. 650 milliGRAM(s) every 6 hours PRN  atorvastatin 80 milliGRAM(s) at bedtime  chlorhexidine 2% Cloths 1 Application(s) <User Schedule>  diltiazem    Tablet 60 milliGRAM(s) <User Schedule>  folic acid 1 milliGRAM(s) daily  hydrALAZINE Injectable 10 milliGRAM(s) every 2 hours PRN  labetalol Injectable 10 milliGRAM(s) every 2 hours PRN  melatonin 6 milliGRAM(s) at bedtime  memantine 5 milliGRAM(s) two times a day  metoprolol tartrate 50 milliGRAM(s) <User Schedule>  modafinil 100 milliGRAM(s) daily  Nephro-roma 1 Tablet(s) daily  polyethylene glycol 3350 17 Gram(s) daily  QUEtiapine 12.5 milliGRAM(s) at bedtime PRN  senna 2 Tablet(s) at bedtime  tamsulosin 0.8 milliGRAM(s) at bedtime      RECENT LABS/IMAGING  - Reviewed Today                        8.6    9.41  )-----------( 267      ( 03 Dec 2023 07:06 )             25.1     12-04    135  |  101  |  24.0<H>  ----------------------------<  109<H>  3.9   |  22.0  |  1.04    Ca    8.9      04 Dec 2023 05:26        Urinalysis Basic - ( 04 Dec 2023 05:26 )    Color: x / Appearance: x / SG: x / pH: x  Gluc: 109 mg/dL / Ketone: x  / Bili: x / Urobili: x   Blood: x / Protein: x / Nitrite: x   Leuk Esterase: x / RBC: x / WBC x   Sq Epi: x / Non Sq Epi: x / Bacteria: x    [facility] 12/2  IMPRESSION:  Right hemicraniectomy. Right subdural fluid attenuation   collection as seen on the prior. Decreased conspicuity to regions of   acute hemorrhage in the right high frontal parasagittal region as well as   stable appearance to hemorrhage in the right insular posterior frontal   region in association with lucency as seen on the prior study    CTH 11/23 - Stable multicompartment intracranial hemorrhages. Stable right-sided subdural collection.    CTH 11/22 - Mixed attenuating subdural hematoma again seen with some   expected postoperative changes. Evolving area of parenchymal hemorrhage involving the right frontal   region.    CTH 11/16/23: Improved to stable multicompartment intracranial hemorrhages.   Right-sided subdural hygroma, larger in size.    US duplex BLE 11/16/23: No evidence of deep venous thrombosis in either lower extremity.    CXR 11/15/23: There is mild pulmonary venous congestion. Left retrocardiac/lower lobe   opacity could represent associated pneumonia.    MR Brain 11/13/23: RIGHT frontoparietal craniectomy with underlying   intracranial hemorrhage and edema within the RIGHT frontal lobe.   Overlying small RIGHT subdural hemorrhage is also unchanged. Scant   hemorrhage in the dependent portions of the BILATERAL lateral ventricles   and minimal subarachnoid hemorrhage seen in the BILATERAL frontal and   parietal lobes. Moderate periventricular and deep white matter ischemia   is noted. Encephalomalacia and gliosis seen in the anterior frontal lobes   bilaterally. Tiny acute lacunar infarction in the LEFT cerebellum and   tiny acute cortical infarction in the RIGHT parietal lobe. Restricted   diffusion also noted about the surgical cavity suggesting infarcted   parenchyma.    CTH 11/29: IMPRESSION: Stable intracranial hemorrhages.    ----------------------------------------------------------------------------------------  PHYSICAL EXAM  Constitutional - Awake   HEENT - +R crani   Chest - Breathing comfortably on NC   Cardiovascular - RRR  Abdomen - Soft   Extremities - No peripheral edema   Neurologic Exam -                    Cognitive - Intermittently follows commands       Communication - Mumbles      Cranial Nerves - No facial asymmetry      Motor -                    LEFT    UE - flaccid                     RIGHT UE - moves extremity spontaneously to pain                     LEFT    LE - moves extremity spontaneously to pain                     RIGHT LE - moves extremity spontaneously to pain      ----------------------------------------------------------------------------------------  ASSESSMENT/PLAN  83yMale with functional deficits after multicompartmental intracranial bleeding.    Traumatic ICH - Stable   Alzheimer's Dementia - Namenda 5mg BID, Neuropsychology (Dr. Diallo) to follow    Wakefulness - Provigil 100mg @0600   CAD, PAfib - Statin, Dilt, Metop   HTN - Hydralazine, labetalol   Dysphagia - Pureed/mod thick   Delirium - Melatonin 6mg qhs, Seroquel 12.5mg qhs PRN   LUE DVT - AC on hold   Dysphagia - Pureed/mod thick   DVT ppx - SCD   Rehab/Impaired mobility and function - Continuous hospitalization is crucial for managing the patient's acute medical issues (Traumatic ICH, delirium), which have significantly impacted their mobility, quality of life, and function. Rehabilitation recommendations will be based on the patient's functional progress and their ability to participate in and tolerate therapeutic interventions, which may change over time. Maintaining ongoing mobilization by the staff is imperative to prevent secondary medical complications and associated health issues related to debility.    Patient is a little more awake and alert today. Continue [id number] daily. Cranioplasty later this week. PM&R will continue to follow.            Holding AC   Cranioplasty 12/7?   More awake and interactive, but still confused and fluctuating   Flaccid paralysis of the left UE   VSS afebrile     FUNCTIONAL PROGRESS  SLP 11/26   Speech Language Pathology Recommendations: 1. Minced/moist diet w/ moderately thick liquids 2. 1:1 assist for PO3. Meds crushed in puree 4. STRICT aspiration precautions 5. upright for PO, slow rate, small bites/sips, alternate solids/liquids 6. Pt must be AWAKE/ALERT FOR PO 7. D/C PO diet w/ overt s/s of penetration/aspiration pending reconsult from this department 8. SLP to follow     PT 12/3   Bed Mobility  Bed Mobility Training Sit-to-Supine: maximum assist (25% patient effort);  2 person assist  Bed Mobility Training Supine-to-Sit: maximum assist (25% patient effort);  2 person assist  Bed Mobility Training Limitations: decreased ability to use arms for pushing/pulling;  decreased ability to use legs for bridging/pushing;  decreased flexibility;  decreased ROM;  decreased strength;  impaired balance;  cognitive, decreased safety awareness    Sit-Stand Transfer Training  Transfer Training Sit-to-Stand Transfer: maximum assist (25% patient effort);  2 person assist;  full weight-bearing   no device + helmet, left knee blocked  Transfer Training Stand-to-Sit Transfer: maximum assist (25% patient effort)  Sit-to-Stand Transfer Training Transfer Safety Analysis: decreased balance;  decreased cognition;  decreased flexibility;  decreased ROM;  decreased strength;  impaired balance    Therapeutic Exercise  Therapeutic Exercise Detail: Pt. worked on sitting balance and c-spine extension at EOB with right UE support and max verbal cues; min to max assist required. 5 mins     OT 11/28  Bathing Training:     · Level of [place]	dependent (less than 25% patients effort)    Upper Body Dressing Training:     · Level of [place]	dependent (less than 25% patients effort)    Lower Body Dressing Training:     · Level of [place]	dependent (less than 25% patients effort)    Toilet Hygiene Training:     · Level of [place]	dependent (less than 25% patients effort)    Grooming Training:     · Level of [place]	maximum assist (25% patients effort)      VITALS  T(C): 36.8 (12-04-23 @ 07:37), Max: 36.9 (12-03-23 @ 19:00)  HR: 84 (12-04-23 @ 06:00) (70 - 92)  BP: 139/66 (12-04-23 @ 06:00) (100/56 - 139/78)  RR: 17 (12-04-23 @ 06:00) (16 - 20)  SpO2: 98% (12-04-23 @ 06:00) (96% - 98%)  Wt(kg): --    MEDICATIONS   acetaminophen     Tablet .. 650 milliGRAM(s) every 6 hours PRN  atorvastatin 80 milliGRAM(s) at bedtime  chlorhexidine 2% Cloths 1 Application(s) <User Schedule>  diltiazem    Tablet 60 milliGRAM(s) <User Schedule>  folic acid 1 milliGRAM(s) daily  hydrALAZINE Injectable 10 milliGRAM(s) every 2 hours PRN  labetalol Injectable 10 milliGRAM(s) every 2 hours PRN  melatonin 6 milliGRAM(s) at bedtime  memantine 5 milliGRAM(s) two times a day  metoprolol tartrate 50 milliGRAM(s) <User Schedule>  modafinil 100 milliGRAM(s) daily  Nephro-roma 1 Tablet(s) daily  polyethylene glycol 3350 17 Gram(s) daily  QUEtiapine 12.5 milliGRAM(s) at bedtime PRN  senna 2 Tablet(s) at bedtime  tamsulosin 0.8 milliGRAM(s) at bedtime      RECENT LABS/IMAGING  - Reviewed Today                        8.6    9.41  )-----------( 267      ( 03 Dec 2023 07:06 )             25.1     12-04    135  |  101  |  24.0<H>  ----------------------------<  109<H>  3.9   |  22.0  |  1.04    Ca    8.9      04 Dec 2023 05:26        Urinalysis Basic - ( 04 Dec 2023 05:26 )    Color: x / Appearance: x / SG: x / pH: x  Gluc: 109 mg/dL / Ketone: x  / Bili: x / Urobili: x   Blood: x / Protein: x / Nitrite: x   Leuk Esterase: x / RBC: x / WBC x   Sq Epi: x / Non Sq Epi: x / Bacteria: x    [facility] 12/2  IMPRESSION:  Right hemicraniectomy. Right subdural fluid attenuation   collection as seen on the prior. Decreased conspicuity to regions of   acute hemorrhage in the right high frontal parasagittal region as well as   stable appearance to hemorrhage in the right insular posterior frontal   region in association with lucency as seen on the prior study    CTH 11/23 - Stable multicompartment intracranial hemorrhages. Stable right-sided subdural collection.    CTH 11/22 - Mixed attenuating subdural hematoma again seen with some   expected postoperative changes. Evolving area of parenchymal hemorrhage involving the right frontal   region.    CTH 11/16/23: Improved to stable multicompartment intracranial hemorrhages.   Right-sided subdural hygroma, larger in size.    US duplex BLE 11/16/23: No evidence of deep venous thrombosis in either lower extremity.    CXR 11/15/23: There is mild pulmonary venous congestion. Left retrocardiac/lower lobe   opacity could represent associated pneumonia.    MR Brain 11/13/23: RIGHT frontoparietal craniectomy with underlying   intracranial hemorrhage and edema within the RIGHT frontal lobe.   Overlying small RIGHT subdural hemorrhage is also unchanged. Scant   hemorrhage in the dependent portions of the BILATERAL lateral ventricles   and minimal subarachnoid hemorrhage seen in the BILATERAL frontal and   parietal lobes. Moderate periventricular and deep white matter ischemia   is noted. Encephalomalacia and gliosis seen in the anterior frontal lobes   bilaterally. Tiny acute lacunar infarction in the LEFT cerebellum and   tiny acute cortical infarction in the RIGHT parietal lobe. Restricted   diffusion also noted about the surgical cavity suggesting infarcted   parenchyma.    CTH 11/29: IMPRESSION: Stable intracranial hemorrhages.    ----------------------------------------------------------------------------------------  PHYSICAL EXAM  Constitutional - Awake   HEENT - +R crani   Chest - Breathing comfortably on NC   Cardiovascular - RRR  Abdomen - Soft   Extremities - No peripheral edema   Neurologic Exam -                    Cognitive - Intermittently follows commands       Communication - Mumbles      Cranial Nerves - No facial asymmetry      Motor -                    LEFT    UE - flaccid                     RIGHT UE - moves extremity spontaneously to pain                     LEFT    LE - moves extremity spontaneously to pain                     RIGHT LE - moves extremity spontaneously to pain      ----------------------------------------------------------------------------------------  ASSESSMENT/PLAN  83yMale with functional deficits after multicompartmental intracranial bleeding.    Traumatic ICH - Stable   Alzheimer's Dementia - Namenda 5mg BID, Neuropsychology (Dr. Diallo) to follow    Wakefulness - Provigil 100mg @0600   CAD, PAfib - Statin, Dilt, Metop   HTN - Hydralazine, labetalol   Dysphagia - Pureed/mod thick   Delirium - Melatonin 6mg qhs, Seroquel 12.5mg qhs PRN   LUE DVT - AC on hold   Dysphagia - Pureed/mod thick   DVT ppx - SCD   Rehab/Impaired mobility and function - Continuous hospitalization is crucial for managing the patient's acute medical issues (Traumatic ICH, delirium), which have significantly impacted their mobility, quality of life, and function. Rehabilitation recommendations will be based on the patient's functional progress and their ability to participate in and tolerate therapeutic interventions, which may change over time. Maintaining ongoing mobilization by the staff is imperative to prevent secondary medical complications and associated health issues related to debility.    Patient is a little more awake and alert today. Continue [id number] daily. Cranioplasty later this week. PM&R will continue to follow.            Tylenol 14:30/medications

## 2023-12-04 NOTE — PROGRESS NOTE ADULT - ASSESSMENT
83M with HTN, CAD s/p PCO, RCC s/p left nephrectomy, bladder Ca, BPH, CHF, Vertigo, HLD, AAA s/p EVAR, parox Afib, h/o HIT, Alzheimer transferred s/p fall with Rt frontal IPH. Initially had hemicraniectomy course complicated by RVR, asp PNA, hypoxic resp failure, LUE DVT and questionable increase in AAA size. Started on AC with waxing/waning mental status prompting head imaging with evidence of new bleed. Now downgrade to Medicine.     IPH -s/p Rt hemicraniotomy   -likely sec to AC  for DVT/Afib with demonstration of new bleed    -repeat CTH 12/02/23,stable  -Q2 neuro checks  -SBP goal normotensive 100-160  -Continue amantadine, memantine   -Cranioplasty planning for likely OR next Thursday 12/7 tentatively. HOLD AC starting 12/4 night.  -Strict BP parameters (maintain SBP < 160, avoid rapid fluctuations)  -Continue modified diet . MB STUDY per speech order  -Helmet to be worn when OOB when OOB      P. Afib/ HTN/HLD   -Continue Metoprolol/diltiazem BID  -Continue Statin   -Labetalol / Hydralazine PRN   -Continue  eliquis until 12/4 per NSx       Questionable veg on TTE - discussed with iD- recommends WHIT  -family is hesitant discussed with wife - await call back from daughter  -f/u with Cardiology to determine if plan for WHIT     Acute DVT   -Continue AC as above, avoid heparin products 2/2 h/o HIT    Normocytic Anemia   Stools visualized. Normal color, no melena / evidence of GI blood loss  Repeat in am along with T&S,  transfuse to goal > 8 given h/o CAD    AAA s/p EVAR   Imaging reviewed by Vascular surgery  No intervention planned at present  Recommended obtaining prior imaging from pts Vascular surgeon (Dr Heredia for comparison)    DONNIE- resolved   Suspect may have CKD II  Monitor and renally dose all medications   hall maintained, TOV when more ambulatory/ mental status improved     Acute hypoxic resp failure 2/2 suspected asp PNA   Resolved   s/p completion of IV Abx   ID recs reviewed    dw rn 83M with HTN, CAD s/p PCO, RCC s/p left nephrectomy, bladder Ca, BPH, CHF, Vertigo, HLD, AAA s/p EVAR, parox Afib, h/o HIT, Alzheimer transferred s/p fall with Rt frontal IPH. Initially had hemicraniectomy course complicated by RVR, asp PNA, hypoxic resp failure, LUE DVT and questionable increase in AAA size. Started on AC with waxing/waning mental status prompting head imaging with evidence of new bleed. Now downgrade to Medicine.     IPH -s/p Rt hemicraniotomy   -likely sec to AC  for DVT/Afib with demonstration of new bleed  -repeat CTH 12/02/23,stable  -Q2 neuro checks  -SBP goal normotensive 100-160  -Continue amantadine, memantine   -Cranioplasty planning for likely OR next Thursday 12/7 tentatively. HOLD AC starting 12/4 night.  -Strict BP parameters (maintain SBP < 160, avoid rapid fluctuations)  -Continue modified diet . MB STUDY per speech order  -Helmet to be worn when OOB when OOB      P. Afib/ HTN/HLD   -Continue Metoprolol/diltiazem BID  -Continue Statin   -Labetalol / Hydralazine PRN   -DC eliquis  per NSx. Got am dose.       Questionable veg on TTE - discussed with iD- recommends WHIT  -family is hesitant discussed with wife - await call back from daughter  -f/u with Cardiology to determine if plan for WHIT     Acute DVT   -Continue AC as above, avoid heparin products 2/2 h/o HIT    Normocytic Anemia   Stools visualized. Normal color, no melena / evidence of GI blood loss  Repeat in am along with T&S,  transfuse to goal > 8 given h/o CAD    AAA s/p EVAR   Imaging reviewed by Vascular surgery  No intervention planned at present  Recommended obtaining prior imaging from pts Vascular surgeon (Dr Heredia for comparison)    DONNIE- resolved   Suspect may have CKD II  Monitor and renally dose all medications   hall maintained, TOV when more ambulatory/ mental status improved     Acute hypoxic resp failure 2/2 suspected asp PNA   Resolved   s/p completion of IV Abx   ID recs reviewed    dw rn

## 2023-12-04 NOTE — PROGRESS NOTE ADULT - SUBJECTIVE AND OBJECTIVE BOX
SUBJECTIVE:  83M w/ PMHX of HTN, CAD s/p stents, RCC s/p L nephrectomy, bladder CA, BPH, CHF, LBBB, vertigo, HLD, 5.5cm AAA without rupture post EVAR, paroxysmal A-fib on Eliquis, Plavix, and aspirin, early stage Alzheimer dementia transferred from [facility] 11/10/23 s/p unwitnessed fall with head strike at home found by wife on floor at 8am. CT head at [place] showed R frontal IPH, SDH, SAH at 9:00. s/p R craniectomy for ICH 11/10. Hospital course complicated by Klebsiella PNA, hypernatremia, lethargy, LUE DVT on Eliquis. Downgraded to stepdown on 11/23, re-upgraded to NSICU on 11/26 for new acute anterior falx SDH. Downgraded back to floor on 11/28, stable.    INTERVAL HPI/OVERNIGHT EVENTS:  Patient seen and examined this morning. No acute overnight events. Plan for cranioplasty on Thursday, 12/7.     Vital Signs Last 24 Hrs  T(C): 36.8 (04 Dec 2023 07:37), Max: 36.9 (03 Dec 2023 19:00)  T(F): 98.2 (04 Dec 2023 07:37), Max: 98.4 (03 Dec 2023 19:00)  HR: 82 (04 Dec 2023 10:00) (70 - 92)  BP: 110/60 (04 Dec 2023 10:00) (100/56 - 139/78)  BP(mean): 71 (04 Dec 2023 10:00) (66 - 102)  RR: 18 (04 Dec 2023 10:00) (15 - 20)  SpO2: 99% (04 Dec 2023 10:00) (96% - 99%)    Parameters below as of 04 Dec 2023 10:00  Patient On (Oxygen Delivery Method): room air        PHYSICAL EXAM:  GENERAL: NAD, well groomed  HEAD: S/p R craniectomy, incision site C/D/I with no active drainage/bleeding noted, flap full but soft   MENTAL STATUS: AAO x1 (oriented to self; confused to place "Home" and year "2004"); Awake; Opens eyes to spontaneously; Minimally conversant requiring encouragement; Following commands on R side   CRANIAL NERVES: PERRL 4mm b/l. EOMI, no gaze deviation. Hearing/speech grossly intact   MOTOR: RUE 5/5; LUE able to roll in plane of bed; RLE 4/5; LLE WD  SENSATION: grossly intact to light touch on R side and noxious stimuli on L side throughout   RESP: Nonlabored on room air    LABS:                            8.6    9.41  )-----------( 267      ( 03 Dec 2023 07:06 )             25.1   12-04    135  |  101  |  24.0<H>  ----------------------------<  109<H>  3.9   |  22.0  |  1.04    Ca    8.9      04 Dec 2023 05:26          RADIOLOGY & ADDITIONAL TESTS:  CT Head No Cont (12.02.23 @ 05:10)  IMPRESSION:    Right hemicraniectomy. Right subdural fluid attenuation   collection as seen on the prior. Decreased conspicuity to regions of   acute hemorrhage in the right high frontal parasagittal region as well as   stable appearance to hemorrhage in the right insular posterior frontal   region in association with lucency as seen on the prior study    CT Head No Cont (11.29.23 @ 08:58):  IMPRESSION:   Stable intracranial hemorrhages.    CT Head No Cont (11.28.23 @ 20:36):  IMPRESSION:   Stable intracranial hemorrhages.    CT Head No Cont (11.27.23 @ 10:32):  IMPRESSION:  Grossly stable CT examination of the head when compared prior imaging.    CT Head No Cont (11.26.23 @ 21:59):  IMPRESSION:   Overall stable follow-up CT exam when compared with the most   recent prior CT study. SUBJECTIVE:  83M w/ PMHX of HTN, CAD s/p stents, RCC s/p L nephrectomy, bladder CA, BPH, CHF, LBBB, vertigo, HLD, 5.5cm AAA without rupture post EVAR, paroxysmal A-fib on Eliquis, Plavix, and aspirin, early stage Alzheimer dementia transferred from [facility] 11/10/23 s/p unwitnessed fall with head strike at home found by wife on floor at 8am. CT head at [place] showed R frontal IPH, SDH, SAH at 9:00. s/p R craniectomy for ICH 11/10. Hospital course complicated by Klebsiella PNA, hypernatremia, lethargy, LUE DVT on Eliquis. Downgraded to stepdown on 11/23, re-upgraded to NSICU on 11/26 for new acute anterior falx SDH. Downgraded back to floor on 11/28, stable.    INTERVAL HPI/OVERNIGHT EVENTS:  Patient seen and examined this morning. No acute overnight events. Plan for cranioplasty on Thursday, 12/7.     Vital Signs Last 24 Hrs  T(C): 36.8 (04 Dec 2023 07:37), Max: 36.9 (03 Dec 2023 19:00)  T(F): 98.2 (04 Dec 2023 07:37), Max: 98.4 (03 Dec 2023 19:00)  HR: 82 (04 Dec 2023 10:00) (70 - 92)  BP: 110/60 (04 Dec 2023 10:00) (100/56 - 139/78)  BP(mean): 71 (04 Dec 2023 10:00) (66 - 102)  RR: 18 (04 Dec 2023 10:00) (15 - 20)  SpO2: 99% (04 Dec 2023 10:00) (96% - 99%)    Parameters below as of 04 Dec 2023 10:00  Patient On (Oxygen Delivery Method): room air        PHYSICAL EXAM:  GENERAL: NAD, well groomed  HEAD: S/p R craniectomy, incision site C/D/I with no active drainage/bleeding noted, flap full but soft   MENTAL STATUS: AAO x1 (oriented to self; confused to place "Home" and year "2004"); Awake; Opens eyes to spontaneously; Minimally conversant requiring encouragement; Following commands on R side   CRANIAL NERVES: PERRL 4mm b/l. EOMI, no gaze deviation. Hearing/speech grossly intact   MOTOR: RUE 5/5; LUE able to roll in plane of bed; RLE 4/5; LLE WD  SENSATION: grossly intact to light touch on R side and noxious stimuli on L side throughout   RESP: Nonlabored on room air    LABS:                            8.6    9.41  )-----------( 267      ( 03 Dec 2023 07:06 )             25.1   12-04    135  |  101  |  24.0<H>  ----------------------------<  109<H>  3.9   |  22.0  |  1.04    Ca    8.9      04 Dec 2023 05:26          RADIOLOGY & ADDITIONAL TESTS:  CT Head No Cont (12.02.23 @ 05:10)  IMPRESSION:    Right hemicraniectomy. Right subdural fluid attenuation   collection as seen on the prior. Decreased conspicuity to regions of   acute hemorrhage in the right high frontal parasagittal region as well as   stable appearance to hemorrhage in the right insular posterior frontal   region in association with lucency as seen on the prior study    CT Head No Cont (11.29.23 @ 08:58):  IMPRESSION:   Stable intracranial hemorrhages.    CT Head No Cont (11.28.23 @ 20:36):  IMPRESSION:   Stable intracranial hemorrhages.    CT Head No Cont (11.27.23 @ 10:32):  IMPRESSION:  Grossly stable CT examination of the head when compared prior imaging.    CT Head No Cont (11.26.23 @ 21:59):  IMPRESSION:   Overall stable follow-up CT exam when compared with the most   recent prior CT study.

## 2023-12-04 NOTE — SWALLOW VFSS/MBS ASSESSMENT ADULT - SLP PERTINENT HISTORY OF CURRENT PROBLEM
As per MD note, "83M w/ PMHS of HTN, CAD s/p stents, RCC s/p L nephrectomy, bladder CA, BPH, CHF, LBBB, vertigo, HLD, 5.5cm AAA without rupture post EVAR, paroxysmal A-fib on Eliquis, Plavix, and aspirin, early stage Alzheimer dementia transferred from [facility] 11/10/23 s/p unwitnessed fall with head strike at home found by wife on floor at 8am. CT head at [place] showed R frontal IPH, SDH, SAH at 9:00. s/p R craniectomy for ICH 11/10. Hospital course complicated by Klebsiella PNA, hypernatremia, lethargy, LUE DVT on Eliquis. Downgraded to stepdown on 11/23, re-upgraded to NSICU on 11/26 for new acute anterior falx SDH. Downgraded back to floor on 11/28, stable". As per MD note, "83M w/ PMHS of HTN, CAD s/p stents, RCC s/p L nephrectomy, bladder CA, BPH, CHF, LBBB, vertigo, HLD, 5.5cm AAA without rupture post EVAR, paroxysmal A-fib on Eliquis, Plavix, and aspirin, early stage Alzheimer dementia transferred from [facility] 11/10/23 s/p unwitnessed fall with head strike at home found by wife on floor at 8am. CT head at [place] showed R frontal IPH, SDH, SAH at 9:00. s/p R craniectomy for ICH 11/10. Hospital course complicated by Klebsiella PNA, hypernatremia, lethargy, LUE DVT on Eliquis. Downgraded to stepdown on 11/23, re-upgraded to NSICU on 11/26 for new acute anterior falx SDH. Downgraded back to floor on 11/28, stable".

## 2023-12-05 PROBLEM — Z86.79 PERSONAL HISTORY OF OTHER DISEASES OF THE CIRCULATORY SYSTEM: Chronic | Status: ACTIVE | Noted: 2023-11-10

## 2023-12-05 PROBLEM — G30.9 ALZHEIMER'S DISEASE, UNSPECIFIED: Chronic | Status: ACTIVE | Noted: 2023-11-10

## 2023-12-05 PROBLEM — I25.10 ATHEROSCLEROTIC HEART DISEASE OF NATIVE CORONARY ARTERY WITHOUT ANGINA PECTORIS: Chronic | Status: ACTIVE | Noted: 2023-11-10

## 2023-12-05 PROBLEM — I10 ESSENTIAL (PRIMARY) HYPERTENSION: Chronic | Status: ACTIVE | Noted: 2023-11-10

## 2023-12-05 LAB
ANION GAP SERPL CALC-SCNC: 12 MMOL/L — SIGNIFICANT CHANGE UP (ref 5–17)
ANION GAP SERPL CALC-SCNC: 12 MMOL/L — SIGNIFICANT CHANGE UP (ref 5–17)
BUN SERPL-MCNC: 25.4 MG/DL — HIGH (ref 8–20)
BUN SERPL-MCNC: 25.4 MG/DL — HIGH (ref 8–20)
CALCIUM SERPL-MCNC: 8.8 MG/DL — SIGNIFICANT CHANGE UP (ref 8.4–10.5)
CALCIUM SERPL-MCNC: 8.8 MG/DL — SIGNIFICANT CHANGE UP (ref 8.4–10.5)
CHLORIDE SERPL-SCNC: 103 MMOL/L — SIGNIFICANT CHANGE UP (ref 96–108)
CHLORIDE SERPL-SCNC: 103 MMOL/L — SIGNIFICANT CHANGE UP (ref 96–108)
CO2 SERPL-SCNC: 21 MMOL/L — LOW (ref 22–29)
CO2 SERPL-SCNC: 21 MMOL/L — LOW (ref 22–29)
CREAT SERPL-MCNC: 1.02 MG/DL — SIGNIFICANT CHANGE UP (ref 0.5–1.3)
CREAT SERPL-MCNC: 1.02 MG/DL — SIGNIFICANT CHANGE UP (ref 0.5–1.3)
EGFR: 73 ML/MIN/1.73M2 — SIGNIFICANT CHANGE UP
EGFR: 73 ML/MIN/1.73M2 — SIGNIFICANT CHANGE UP
GLUCOSE SERPL-MCNC: 108 MG/DL — HIGH (ref 70–99)
GLUCOSE SERPL-MCNC: 108 MG/DL — HIGH (ref 70–99)
HCT VFR BLD CALC: 28.1 % — LOW (ref 39–50)
HCT VFR BLD CALC: 28.1 % — LOW (ref 39–50)
HGB BLD-MCNC: 9.4 G/DL — LOW (ref 13–17)
HGB BLD-MCNC: 9.4 G/DL — LOW (ref 13–17)
MCHC RBC-ENTMCNC: 32 PG — SIGNIFICANT CHANGE UP (ref 27–34)
MCHC RBC-ENTMCNC: 32 PG — SIGNIFICANT CHANGE UP (ref 27–34)
MCHC RBC-ENTMCNC: 33.5 GM/DL — SIGNIFICANT CHANGE UP (ref 32–36)
MCHC RBC-ENTMCNC: 33.5 GM/DL — SIGNIFICANT CHANGE UP (ref 32–36)
MCV RBC AUTO: 95.6 FL — SIGNIFICANT CHANGE UP (ref 80–100)
MCV RBC AUTO: 95.6 FL — SIGNIFICANT CHANGE UP (ref 80–100)
PLATELET # BLD AUTO: 226 K/UL — SIGNIFICANT CHANGE UP (ref 150–400)
PLATELET # BLD AUTO: 226 K/UL — SIGNIFICANT CHANGE UP (ref 150–400)
POTASSIUM SERPL-MCNC: 4 MMOL/L — SIGNIFICANT CHANGE UP (ref 3.5–5.3)
POTASSIUM SERPL-MCNC: 4 MMOL/L — SIGNIFICANT CHANGE UP (ref 3.5–5.3)
POTASSIUM SERPL-SCNC: 4 MMOL/L — SIGNIFICANT CHANGE UP (ref 3.5–5.3)
POTASSIUM SERPL-SCNC: 4 MMOL/L — SIGNIFICANT CHANGE UP (ref 3.5–5.3)
RBC # BLD: 2.94 M/UL — LOW (ref 4.2–5.8)
RBC # BLD: 2.94 M/UL — LOW (ref 4.2–5.8)
RBC # FLD: 13.9 % — SIGNIFICANT CHANGE UP (ref 10.3–14.5)
RBC # FLD: 13.9 % — SIGNIFICANT CHANGE UP (ref 10.3–14.5)
SODIUM SERPL-SCNC: 136 MMOL/L — SIGNIFICANT CHANGE UP (ref 135–145)
SODIUM SERPL-SCNC: 136 MMOL/L — SIGNIFICANT CHANGE UP (ref 135–145)
WBC # BLD: 8.78 K/UL — SIGNIFICANT CHANGE UP (ref 3.8–10.5)
WBC # BLD: 8.78 K/UL — SIGNIFICANT CHANGE UP (ref 3.8–10.5)
WBC # FLD AUTO: 8.78 K/UL — SIGNIFICANT CHANGE UP (ref 3.8–10.5)
WBC # FLD AUTO: 8.78 K/UL — SIGNIFICANT CHANGE UP (ref 3.8–10.5)

## 2023-12-05 PROCEDURE — 70450 CT HEAD/BRAIN W/O DYE: CPT | Mod: 26

## 2023-12-05 PROCEDURE — 99233 SBSQ HOSP IP/OBS HIGH 50: CPT

## 2023-12-05 PROCEDURE — 99232 SBSQ HOSP IP/OBS MODERATE 35: CPT

## 2023-12-05 RX ORDER — CHLORHEXIDINE GLUCONATE 213 G/1000ML
1 SOLUTION TOPICAL DAILY
Refills: 0 | Status: COMPLETED | OUTPATIENT
Start: 2023-12-06 | End: 2023-12-07

## 2023-12-05 RX ADMIN — ATORVASTATIN CALCIUM 80 MILLIGRAM(S): 80 TABLET, FILM COATED ORAL at 22:45

## 2023-12-05 RX ADMIN — MODAFINIL 100 MILLIGRAM(S): 200 TABLET ORAL at 12:36

## 2023-12-05 RX ADMIN — Medication 50 MILLIGRAM(S): at 12:37

## 2023-12-05 RX ADMIN — CHLORHEXIDINE GLUCONATE 1 APPLICATION(S): 213 SOLUTION TOPICAL at 05:35

## 2023-12-05 RX ADMIN — Medication 1 TABLET(S): at 12:36

## 2023-12-05 RX ADMIN — Medication 50 MILLIGRAM(S): at 00:34

## 2023-12-05 RX ADMIN — Medication 60 MILLIGRAM(S): at 17:45

## 2023-12-05 RX ADMIN — SENNA PLUS 2 TABLET(S): 8.6 TABLET ORAL at 22:45

## 2023-12-05 RX ADMIN — Medication 1 MILLIGRAM(S): at 12:36

## 2023-12-05 RX ADMIN — Medication 60 MILLIGRAM(S): at 05:35

## 2023-12-05 RX ADMIN — Medication 6 MILLIGRAM(S): at 22:44

## 2023-12-05 NOTE — PROGRESS NOTE ADULT - ASSESSMENT
83M with HTN, CAD s/p PCO, RCC s/p left nephrectomy, bladder Ca, BPH, CHF, Vertigo, HLD, AAA s/p EVAR, parox Afib, h/o HIT, Alzheimer transferred s/p fall with Rt frontal IPH. Initially had hemicraniectomy course complicated by RVR, asp PNA, hypoxic resp failure, LUE DVT and questionable increase in AAA size. Started on AC with waxing/waning mental status prompting head imaging with evidence of new bleed. Now downgrade to Medicine.     IPH -s/p Rt hemicraniotomy   -likely sec to AC  for DVT/Afib with demonstration of new bleed  -repeat CTH 12/02/23,stable  -Q2 neuro checks  -SBP goal normotensive 100-160  -Continue amantadine, memantine   -Cranioplasty planning for likely OR next Thursday 12/7 tentatively. HOLD AC starting 12/4 night.  -Strict BP parameters (maintain SBP < 160, avoid rapid fluctuations)  -Continue modified diet . MB STUDY per speech order  -Helmet to be worn when OOB when OOB      P. Afib/ HTN/HLD   -Continue Metoprolol/diltiazem BID  -Continue Statin   -Labetalol / Hydralazine PRN   -Hold eliquis  per NSx. last dose 12/04 am        Questionable veg on TTE - discussed with iD- recommends WHIT  -family is hesitant discussed with wife - await call back from daughter  -f/u with Cardiology to determine if plan for WHIT     Acute DVT   -Continue AC as above, avoid heparin products 2/2 h/o HIT    Normocytic Anemia   Stools visualized. Normal color, no melena / evidence of GI blood loss  Repeat in am along with T&S,  transfuse to goal > 8 given h/o CAD    AAA s/p EVAR   Imaging reviewed by Vascular surgery  No intervention planned at present  Recommended obtaining prior imaging from pts Vascular surgeon (Dr Heredia for comparison)    DONNIE- resolved   Suspect may have CKD II  Monitor and renally dose all medications   hall maintained, TOV when more ambulatory/ mental status improved     Acute hypoxic resp failure 2/2 suspected asp PNA   Resolved   s/p completion of IV Abx   ID recs reviewed    dw rn 83M with HTN, CAD s/p PCO, RCC s/p left nephrectomy, bladder Ca, BPH, CHF, Vertigo, HLD, AAA s/p EVAR, parox Afib, h/o HIT, Alzheimer transferred s/p fall with Rt frontal IPH. Initially had hemicraniectomy course complicated by RVR, asp PNA, hypoxic resp failure, LUE DVT and questionable increase in AAA size. Started on AC with waxing/waning mental status prompting head imaging with evidence of new bleed. Now downgrade to Medicine.     IPH -s/p Rt hemicraniotomy   -likely sec to AC  for DVT/Afib with demonstration of new bleed  -repeat CTH 12/02/23,stable  -Q2 neuro checks  -SBP goal normotensive 100-160  -Continue amantadine, memantine   -Cranioplasty planning for likely OR next Thursday 12/7 tentatively. HOLD AC starting 12/4 night.  -Strict BP parameters (maintain SBP < 160, avoid rapid fluctuations)  -Continue modified diet . MB STUDY per speech order  -Helmet to be worn when OOB when OOB      P. Afib/ HTN/HLD   -Continue Metoprolol/diltiazem BID  -Continue Statin   -Labetalol / Hydralazine PRN   -Hold eliquis  per NSx. last dose 12/04 am        Questionable veg on TTE - discussed with iD- recommends WHIT  -family is hesitant discussed with wife - await call back from daughter  -f/u with Cardiology to determine if plan for WHIT     Acute DVT   -hold  AC as above, avoid heparin products 2/2 h/o HIT  -b/l scd;s for now per neurosurgery    Normocytic Anemia   Stools visualized. Normal color, no melena / evidence of GI blood loss  Repeat in am along with T&S,  transfuse to goal > 8 given h/o CAD    AAA s/p EVAR   Imaging reviewed by Vascular surgery  No intervention planned at present  Recommended obtaining prior imaging from pts Vascular surgeon (Dr Heredia for comparison)    DONNIE- resolved   Suspect may have CKD II  Monitor and renally dose all medications   hall maintained, TOV when more ambulatory/ mental status improved     Acute hypoxic resp failure 2/2 suspected asp PNA   Resolved   s/p completion of IV Abx   ID recs reviewed  dc namenda/cardura---family does not want--per family made pt confused in past.  bebeto rn 83M with HTN, CAD s/p PCO, RCC s/p left nephrectomy, bladder Ca, BPH, CHF, Vertigo, HLD, AAA s/p EVAR, parox Afib, h/o HIT, Alzheimer transferred s/p fall with Rt frontal IPH. Initially had hemicraniectomy course complicated by RVR, asp PNA, hypoxic resp failure, LUE DVT and questionable increase in AAA size. Started on AC with waxing/waning mental status prompting head imaging with evidence of new bleed. Now downgrade to Medicine.     IPH -s/p Rt hemicraniotomy   -likely sec to AC  for DVT/Afib with demonstration of new bleed  -repeat CTH 12/02/23,stable  -Q2 neuro checks  -SBP goal normotensive 100-160  -Continue amantadine, memantine   -Cranioplasty planning for likely OR next Thursday 12/7 tentatively. HOLD AC starting 12/4 night.  -Strict BP parameters (maintain SBP < 160, avoid rapid fluctuations)  -Continue modified diet . MB STUDY per speech order  -Helmet to be worn when OOB when OOB      P. Afib/ HTN/HLD   -Continue Metoprolol/diltiazem BID  -Continue Statin   -Labetalol / Hydralazine PRN   -Hold eliquis  per NSx. last dose 12/04 am        Questionable veg on TTE - discussed with iD- recommends WHIT  -family is hesitant discussed with wife - await call back from daughter  -f/u with Cardiology to determine if plan for WHIT     Acute DVT   -hold  AC as above, avoid heparin products 2/2 h/o HIT  -b/l scd;s for now per neurosurgery    Normocytic Anemia   Stools visualized. Normal color, no melena / evidence of GI blood loss  Repeat in am along with T&S,  transfuse to goal > 8 given h/o CAD    AAA s/p EVAR   Imaging reviewed by Vascular surgery  No intervention planned at present  Recommended obtaining prior imaging from pts Vascular surgeon (Dr Heredia for comparison)    DONNIE- resolved   Suspect may have CKD II  Monitor and renally dose all medications   hall maintained, TOV when more ambulatory/ mental status improved     Acute hypoxic resp failure 2/2 suspected asp PNA   Resolved   s/p completion of IV Abx   ID recs reviewed  dc namenda/cardura---family does not want--per family made pt confused in past.  called family-left msg  dw rn

## 2023-12-05 NOTE — CHART NOTE - NSCHARTNOTEFT_GEN_A_CORE
I have evaluated this patient and have determined that restraints are warranted to optimize medical care. Patient was assessed for current physical and psychological risk factors as well as special needs. There are no medical conditions or limitations that would place this patient at risk while in restraints. Dr. Cee made aware.

## 2023-12-05 NOTE — PROGRESS NOTE ADULT - SUBJECTIVE AND OBJECTIVE BOX
Patient is a 83y old  Male who presents with a chief complaint of s/p unwitnessed fall with head strike (05 Dec 2023 11:42)      No acute issues.no cp,back pain,headache  REVIEW OF SYSTEMS: All systems are reviewed and found to be negative except above--not reliable    MEDICATIONS  (STANDING):  atorvastatin 80 milliGRAM(s) Oral at bedtime  chlorhexidine 2% Cloths 1 Application(s) Topical <User Schedule>  diltiazem    Tablet 60 milliGRAM(s) Oral <User Schedule>  folic acid 1 milliGRAM(s) Oral daily  melatonin 6 milliGRAM(s) Oral at bedtime  metoprolol tartrate 50 milliGRAM(s) Oral <User Schedule>  modafinil 100 milliGRAM(s) Oral daily  Nephro-roma 1 Tablet(s) Oral daily  polyethylene glycol 3350 17 Gram(s) Oral daily  senna 2 Tablet(s) Oral at bedtime    MEDICATIONS  (PRN):  acetaminophen     Tablet .. 650 milliGRAM(s) Oral every 6 hours PRN Temp greater or equal to 38C (100.4F), Mild Pain (1 - 3)  hydrALAZINE Injectable 10 milliGRAM(s) IV Push every 2 hours PRN SBP>160  labetalol Injectable 10 milliGRAM(s) IV Push every 2 hours PRN SBP>160  QUEtiapine 12.5 milliGRAM(s) Oral at bedtime PRN delirium      CAPILLARY BLOOD GLUCOSE        I&O's Summary    04 Dec 2023 07:01  -  05 Dec 2023 07:00  --------------------------------------------------------  IN: 0 mL / OUT: 975 mL / NET: -975 mL        PHYSICAL EXAM:  Vital Signs Last 24 Hrs  T(C): 36.9 (05 Dec 2023 08:04), Max: 37.2 (04 Dec 2023 19:00)  T(F): 98.4 (05 Dec 2023 08:04), Max: 98.9 (04 Dec 2023 19:00)  HR: 85 (05 Dec 2023 12:00) (71 - 100)  BP: 151/71 (05 Dec 2023 12:34) (107/57 - 151/73)  BP(mean): 89 (05 Dec 2023 12:34) (64 - 93)  RR: 16 (05 Dec 2023 12:00) (15 - 22)  SpO2: 100% (05 Dec 2023 12:00) (96% - 100%)    Parameters below as of 05 Dec 2023 12:00  Patient On (Oxygen Delivery Method): room air        CONSTITUTIONAL: NAD,  EYES: PERRLA; conjunctiva and sclera clear  ENMT: Moist oral mucosa,   RESPIRATORY: Normal respiratory effort; lungs are clear to auscultation bilaterally  CARDIOVASCULAR: Regular rate and rhythm, normal S1 and S2, no murmur   EXTS: No lower extremity edema; Peripheral pulses are 2+ bilaterally  ABDOMEN: Nontender to palpation, normoactive bowel sounds, no rebound/guarding;   MUSCLOSKELETAL:  no joint swelling or tenderness to palpation  PSYCH: calm  NEUROLOGY: A+O to person, not place, and time; no acute change      LABS:                        9.4    8.78  )-----------( 226      ( 05 Dec 2023 06:20 )             28.1     12-05    136  |  103  |  25.4<H>  ----------------------------<  108<H>  4.0   |  21.0<L>  |  1.02    Ca    8.8      05 Dec 2023 06:20            Urinalysis Basic - ( 05 Dec 2023 06:20 )    Color: x / Appearance: x / SG: x / pH: x  Gluc: 108 mg/dL / Ketone: x  / Bili: x / Urobili: x   Blood: x / Protein: x / Nitrite: x   Leuk Esterase: x / RBC: x / WBC x   Sq Epi: x / Non Sq Epi: x / Bacteria: x          RADIOLOGY & ADDITIONAL TESTS:  Results Reviewed:

## 2023-12-05 NOTE — PROGRESS NOTE ADULT - ASSESSMENT
83M w/ PMHS of HTN, CAD s/p stents, RCC s/p L nephrectomy, bladder CA, BPH, CHF, LBBB, vertigo, HLD, 5.5cm AAA without rupture post EVAR, paroxysmal A-fib on Eliquis, Plavix, and aspirin, early stage Alzheimer dementia transferred from [facility] 11/10/23 s/p unwitnessed fall with head strike at home found by wife on floor at 8am. CT head at [place] showed R frontal IPH, SDH, SAH at 9:00. s/p R craniectomy for ICH 11/10. Pending cranioplasty on Thursday, 12/7    PLAN:    - Imaging reviewed   - Q2 hour neuro checks w/ protected sleep   - Helmet to be worn when OOB   - Amantadine 150mg QAM  - Memantine 5mg BID   - Pain control PRN, avoid oversedation  - STAT CTH for any decline in neuro exam   - SBP goal normotensive 100-160  - Cranioplasty planned for Thursday 12/7, flap sunken and soft, CTH done and sent to [employer] rep, likely delivery on 12/6  - Will need to be pre-op'd on Wednesday, 12/6  - Will need medical clearance prior to OR  - b/l SCDs, hold Eliquis   - Further medical management/supportive care per primary team   - Discussed case w/ Dr. Larios 83M w/ PMHS of HTN, CAD s/p stents, RCC s/p L nephrectomy, bladder CA, BPH, CHF, LBBB, vertigo, HLD, 5.5cm AAA without rupture post EVAR, paroxysmal A-fib on Eliquis, Plavix, and aspirin, early stage Alzheimer dementia transferred from [facility] 11/10/23 s/p unwitnessed fall with head strike at home found by wife on floor at 8am. CT head at [place] showed R frontal IPH, SDH, SAH at 9:00. s/p R craniectomy for ICH 11/10. Pending cranioplasty on Thursday, 12/7    PLAN:    - Imaging reviewed   - Q2 hour neuro checks w/ protected sleep   - Helmet to be worn when OOB   - Amantadine 150mg QAM  - Memantine 5mg BID   - Pain control PRN, avoid oversedation  - STAT CTH for any decline in neuro exam   - SBP goal normotensive 100-160  - Cranioplasty planned for Thursday 12/7, flap sunken and soft, CTH done and sent to [employer] rep, likely delivery on 12/6  - Will need to be pre-op'd on Wednesday, 12/6  - Will need medical clearance prior to OR  - b/l SCDs, hold Eliquis   - Further medical management/supportive care per primary team   - Discussed case w/ Dr. Larios

## 2023-12-05 NOTE — PROGRESS NOTE ADULT - SUBJECTIVE AND OBJECTIVE BOX
SUBJECTIVE:  83M w/ PMHX of HTN, CAD s/p stents, RCC s/p L nephrectomy, bladder CA, BPH, CHF, LBBB, vertigo, HLD, 5.5cm AAA without rupture post EVAR, paroxysmal A-fib on Eliquis, Plavix, and aspirin, early stage Alzheimer dementia transferred from [facility] 11/10/23 s/p unwitnessed fall with head strike at home found by wife on floor at 8am. CT head at [place] showed R frontal IPH, SDH, SAH at 9:00. s/p R craniectomy for ICH 11/10. Hospital course complicated by Klebsiella PNA, hypernatremia, lethargy, LUE DVT on Eliquis. Downgraded to stepdown on 11/23, re-upgraded to NSICU on 11/26 for new acute anterior falx SDH. Downgraded back to floor on 11/28, stable.    INTERVAL HPI/OVERNIGHT EVENTS:  Patient seen and examined this morning. No acute overnight events. Plan for cranioplasty on Thursday, 12/7. CTH for pre-op planning completed today, read pending.     Vital Signs Last 24 Hrs  T(C): 36.9 (12-05-23 @ 08:04), Max: 37.2 (12-04-23 @ 19:00)  T(F): 98.4 (12-05-23 @ 08:04), Max: 98.9 (12-04-23 @ 19:00)  HR: 79 (12-05-23 @ 10:00) (71 - 100)  BP: 151/73 (12-05-23 @ 10:00) (105/60 - 151/73)  BP(mean): 93 (12-05-23 @ 10:00) (64 - 93)  RR: 17 (12-05-23 @ 10:00) (15 - 22)  SpO2: 100% (12-05-23 @ 10:00) (96% - 100%)        PHYSICAL EXAM:  GENERAL: NAD, well groomed  HEAD: S/p R craniectomy, incision site C/D/I with no active drainage/bleeding noted, flap sunken, soft   MENTAL STATUS: AAO x1 (oriented to self; confused to place "i don't know" and year); Awake; Opens eyes to spontaneously; Minimally conversant requiring encouragement; Following commands on R side   CRANIAL NERVES: PERRL 4mm b/l. EOMI, no gaze deviation. Hearing/speech grossly intact   MOTOR: RUE 5/5; LUE able to roll in plane of bed; RLE 4/5; LLE WD  SENSATION: grossly intact to light touch on R side and noxious stimuli on L side throughout   RESP: Nonlabored on room air    LABS:                            9.4    8.78  )-----------( 226      ( 05 Dec 2023 06:20 )             28.1   12-05    136  |  103  |  25.4<H>  ----------------------------<  108<H>  4.0   |  21.0<L>  |  1.02    Ca    8.8      05 Dec 2023 06:20    I&O's Detail    04 Dec 2023 07:01  -  05 Dec 2023 07:00  --------------------------------------------------------  IN:  Total IN: 0 mL    OUT:    Indwelling Catheter - Urethral (mL): 975 mL  Total OUT: 975 mL    Total NET: -975 mL      RADIOLOGY & ADDITIONAL TESTS:  CT Head No Cont (12.02.23 @ 05:10)  IMPRESSION:    Right hemicraniectomy. Right subdural fluid attenuation   collection as seen on the prior. Decreased conspicuity to regions of   acute hemorrhage in the right high frontal parasagittal region as well as   stable appearance to hemorrhage in the right insular posterior frontal   region in association with lucency as seen on the prior study    CT Head No Cont (11.29.23 @ 08:58):  IMPRESSION:   Stable intracranial hemorrhages.    CT Head No Cont (11.28.23 @ 20:36):  IMPRESSION:   Stable intracranial hemorrhages.    CT Head No Cont (11.27.23 @ 10:32):  IMPRESSION:  Grossly stable CT examination of the head when compared prior imaging.    CT Head No Cont (11.26.23 @ 21:59):  IMPRESSION:   Overall stable follow-up CT exam when compared with the most   recent prior CT study. SUBJECTIVE:  83M w/ PMHX of HTN, CAD s/p stents, RCC s/p L nephrectomy, bladder CA, BPH, CHF, LBBB, vertigo, HLD, 5.5cm AAA without rupture post EVAR, paroxysmal A-fib on Eliquis, Plavix, and aspirin, early stage Alzheimer dementia transferred from [facility] 11/10/23 s/p unwitnessed fall with head strike at home found by wife on floor at 8am. CT head at [place] showed R frontal IPH, SDH, SAH at 9:00. s/p R craniectomy for ICH 11/10. Hospital course complicated by Klebsiella PNA, hypernatremia, lethargy, LUE DVT on Eliquis. Downgraded to stepdown on 11/23, re-upgraded to NSICU on 11/26 for new acute anterior falx SDH. Downgraded back to floor on 11/28, stable.    INTERVAL HPI/OVERNIGHT EVENTS:  Patient seen and examined this morning. No acute overnight events. Plan for cranioplasty on Thursday, 12/7. CTH for pre-op planning completed today, read pending.     Vital Signs Last 24 Hrs  T(C): 36.9 (12-05-23 @ 08:04), Max: 37.2 (12-04-23 @ 19:00)  T(F): 98.4 (12-05-23 @ 08:04), Max: 98.9 (12-04-23 @ 19:00)  HR: 79 (12-05-23 @ 10:00) (71 - 100)  BP: 151/73 (12-05-23 @ 10:00) (105/60 - 151/73)  BP(mean): 93 (12-05-23 @ 10:00) (64 - 93)  RR: 17 (12-05-23 @ 10:00) (15 - 22)  SpO2: 100% (12-05-23 @ 10:00) (96% - 100%)        PHYSICAL EXAM:  GENERAL: NAD, well groomed  HEAD: S/p R craniectomy, incision site C/D/I with no active drainage/bleeding noted, flap sunken, soft   MENTAL STATUS: AAO x1 (oriented to self; confused to place "i don't know" and year); Awake; Opens eyes to spontaneously; Minimally conversant requiring encouragement; Following commands on R side   CRANIAL NERVES: PERRL 4mm b/l. EOMI, no gaze deviation. Hearing/speech grossly intact   MOTOR: RUE 5/5; LUE able to roll in plane of bed; RLE 4/5; LLE WD  SENSATION: grossly intact to light touch on R side and noxious stimuli on L side throughout   RESP: Nonlabored on room air    LABS:                            9.4    8.78  )-----------( 226      ( 05 Dec 2023 06:20 )             28.1   12-05    136  |  103  |  25.4<H>  ----------------------------<  108<H>  4.0   |  21.0<L>  |  1.02    Ca    8.8      05 Dec 2023 06:20    I&O's Detail    04 Dec 2023 07:01  -  05 Dec 2023 07:00  --------------------------------------------------------  IN:  Total IN: 0 mL    OUT:    Indwelling Catheter - Urethral (mL): 975 mL  Total OUT: 975 mL    Total NET: -975 mL      RADIOLOGY & ADDITIONAL TESTS:  CT Head No Cont (12.02.23 @ 05:10)  IMPRESSION:    Right hemicraniectomy. Right subdural fluid attenuation   collection as seen on the prior. Decreased conspicuity to regions of   acute hemorrhage in the right high frontal parasagittal region as well as   stable appearance to hemorrhage in the right insular posterior frontal   region in association with lucency as seen on the prior study    CT Head No Cont (11.29.23 @ 08:58):  IMPRESSION:   Stable intracranial hemorrhages.    CT Head No Cont (11.28.23 @ 20:36):  IMPRESSION:   Stable intracranial hemorrhages.    CT Head No Cont (11.27.23 @ 10:32):  IMPRESSION:  Grossly stable CT examination of the head when compared prior imaging.    CT Head No Cont (11.26.23 @ 21:59):  IMPRESSION:   Overall stable follow-up CT exam when compared with the most   recent prior CT study.

## 2023-12-05 NOTE — PROGRESS NOTE ADULT - SUBJECTIVE AND OBJECTIVE BOX
Patient somnolent.  Limited participation.     FUNCTIONAL PROGRESS  12/4 [facility] [place]  Speech Language Pathology Recommendations: 1. Easy to chew w/moderately thick liquids 2. Aspiration precautions3. Meds whole in puree4. 1:1 assist for all meals5. Upright for all PO, small bites/sips, slow rate6. Oral care7. SLP to follow for diet tolerance monitoring and dysphagia tx, as schedule permits     12/3 PT  Bed Mobility  Bed Mobility Training Sit-to-Supine: maximum assist (25% patient effort);  2 person assist  Bed Mobility Training Supine-to-Sit: maximum assist (25% patient effort);  2 person assist  Bed Mobility Training Limitations: decreased ability to use arms for pushing/pulling;  decreased ability to use legs for bridging/pushing;  decreased flexibility;  decreased ROM;  decreased strength;  impaired balance;  cognitive, decreased safety awareness    Sit-Stand Transfer Training  Transfer Training Sit-to-Stand Transfer: maximum assist (25% patient effort);  2 person assist;  full weight-bearing   no device + helmet, left knee blocked  Transfer Training Stand-to-Sit Transfer: maximum assist (25% patient effort)  Sit-to-Stand Transfer Training Transfer Safety Analysis: decreased balance;  decreased cognition;  decreased flexibility;  decreased ROM;  decreased strength;  impaired balance    12/3 OT  Bed Mobility  Bed Mobility Training Sit-to-Supine: maximum assist (25% patient effort);  2 person assist  Bed Mobility Training Supine-to-Sit: maximum assist (25% patient effort);  2 person assist  Bed Mobility Training Limitations: impaired balance;  impaired coordination;  impaired motor control    Sit-Stand Transfer Training  Transfer Training Sit-to-Stand Transfer: maximum assist (25% patient effort);  2 person assist  Transfer Training Stand-to-Sit Transfer: maximum assist (25% patient effort);  2 person assist  Sit-to-Stand Transfer Training Transfer Safety Analysis: impaired coordination;  impaired motor control    Neuromuscular Re-education  Neuromuscular Re-education Detail: Pt sat at the edge of the bed with minimal assist to maintain static sitting balance after initial max assist. Midline orientation achieved when pt self supported. Occasional ability to maintain static sitting balance self supported. Pt stood for one trial with maximal assist x2, increased assist to stand fully upright. Pt tolerated all well.         VITALS  T(C): 36.9 (12-05-23 @ 08:04), Max: 37.2 (12-04-23 @ 19:00)  HR: 73 (12-05-23 @ 08:00) (71 - 100)  BP: 107/57 (12-05-23 @ 08:00) (105/60 - 136/68)  RR: 15 (12-05-23 @ 08:00) (15 - 22)  SpO2: 98% (12-05-23 @ 08:00) (96% - 99%)  Wt(kg): --    MEDICATIONS   acetaminophen     Tablet .. 650 milliGRAM(s) every 6 hours PRN  atorvastatin 80 milliGRAM(s) at bedtime  chlorhexidine 2% Cloths 1 Application(s) <User Schedule>  diltiazem    Tablet 60 milliGRAM(s) <User Schedule>  doxazosin 1 milliGRAM(s) at bedtime  folic acid 1 milliGRAM(s) daily  hydrALAZINE Injectable 10 milliGRAM(s) every 2 hours PRN  labetalol Injectable 10 milliGRAM(s) every 2 hours PRN  melatonin 6 milliGRAM(s) at bedtime  memantine 5 milliGRAM(s) two times a day  metoprolol tartrate 50 milliGRAM(s) <User Schedule>  modafinil 100 milliGRAM(s) daily  Nephro-roma 1 Tablet(s) daily  polyethylene glycol 3350 17 Gram(s) daily  QUEtiapine 12.5 milliGRAM(s) at bedtime PRN  senna 2 Tablet(s) at bedtime      RECENT LABS/IMAGING  - Reviewed Today                        9.4    8.78  )-----------( 226      ( 05 Dec 2023 06:20 )             28.1     12-05    136  |  103  |  25.4<H>  ----------------------------<  108<H>  4.0   |  21.0<L>  |  1.02    Ca    8.8      05 Dec 2023 06:20        Urinalysis Basic - ( 05 Dec 2023 06:20 )    Color: x / Appearance: x / SG: x / pH: x  Gluc: 108 mg/dL / Ketone: x  / Bili: x / Urobili: x   Blood: x / Protein: x / Nitrite: x   Leuk Esterase: x / RBC: x / WBC x   Sq Epi: x / Non Sq Epi: x / Bacteria: x        MR Brain 11/13 - RIGHT frontoparietal craniectomy with underlying intracranial hemorrhage and edema within the RIGHT frontal lobe. Overlying small RIGHT subdural hemorrhage is also unchanged. Scant hemorrhage in the dependent portions of the BILATERAL lateral ventricles and minimal subarachnoid hemorrhage seen in the BILATERAL frontal and parietal lobes. Moderate periventricular and deep white matter ischemia is noted. Encephalomalacia and gliosis seen in the anterior frontal lobes bilaterally. Tiny acute lacunar infarction in the LEFT cerebellum and tiny acute cortical infarction in the RIGHT parietal lobe. Restricted diffusion also noted about the surgical cavity suggesting infarcted parenchyma.    CXR 11/15 - There is mild pulmonary venous congestion. Left retrocardiac/lower lobe opacity could represent associated pneumonia.    HEAD CT 11/16 - Improved to stable multicompartment intracranial hemorrhages. Right-sided subdural hygroma, larger in size.    US V DOPPLER BLE 11/16 - No evidence of deep venous thrombosis in either lower extremity.    HEAD CT 11/22 - Mixed attenuating subdural hematoma again seen with some expected postoperative changes. Evolving area of parenchymal hemorrhage involving the right frontal region.    HEAD CT 11/23 - Stable multicompartment intracranial hemorrhages. Stable right-sided subdural collection.    HEAD CT 11/29 - Stable intracranial hemorrhages.    HEAD CT 12/2  Right hemicraniectomy. Right subdural fluid attenuation collection as seen on the prior. Decreased conspicuity to regions of acute hemorrhage in the right high frontal parasagittal region as well as stable appearance to hemorrhage in the right insular posterior frontal region in association with lucency as seen on the prior study    ----------------------------------------------------------------------------------------  PHYSICAL EXAM  Constitutional - NAD, Comfortable   HEENT - Right craniectomy - CDI  Extremities - No peripheral edema   Neurologic Exam -                    Cognitive - Somnolent     Communication - Limited vebralizations     Motor - Limited exam  Psych - Somnolent  ----------------------------------------------------------------------------------------  ASSESSMENT/PLAN  83yMale with functional deficits after a fall sustaining multicompartmental intracranial bleeding  Wakefulness - Provigil 100mg Q6AM (12/2)  Alzheimer's Dementia - Namenda 5mg BID, Seroquel PRN   CAD, PAFIB - Cardizem, Lopressor, Lipitor, Hydralazine, Labetalol  HTN - Hydralazine, labetalol   Dysphagia - Pureed/mod thick   Sleep - Melatonin    Oropharyngeal Dysphagia - Soft & MOD THICK Liquids  DVT PPX - SCD   Rehab/Impaired mobility and function - Patient continues to require hospitalization for the above diagnoses and ongoing active management of comorbid complications (pending cranioplasty, IV HTN medications) that are substantially impairing functional ability and impairing quality of life necessitating ongoing medical management of these complications.     When medically optimized, based on the patient's diagnosis, current functional status, and potential for progress, recommend HOLLY, patient DOES NOT meet acute inpatient rehabilitation criteria. Patient needs a more prolonged stay to achieve transition to community living and would not be able to tolerate a comprehensive/intense rehab program of 3hours/day.     Will continue to follow. Rehab recommendations are dependent on how functional progress changes as well as how patient continues to participate and tolerate therapeutic interventions, which may change. Recommend ongoing mobilization by staff to maintain cardiopulmonary function and prevention of secondary complications related to debility. Discussed the specific management and recommendations above with rehab clinical care team/rehab liaison.     Patient somnolent.  Limited participation.     FUNCTIONAL PROGRESS  12/4 [facility] [place]  Speech Language Pathology Recommendations: 1. Easy to chew w/moderately thick liquids 2. Aspiration precautions3. Meds whole in puree4. 1:1 assist for all meals5. Upright for all PO, small bites/sips, slow rate6. Oral care7. SLP to follow for diet tolerance monitoring and dysphagia tx, as schedule permits     12/3 PT  Bed Mobility  Bed Mobility Training Sit-to-Supine: maximum assist (25% patient effort);  2 person assist  Bed Mobility Training Supine-to-Sit: maximum assist (25% patient effort);  2 person assist  Bed Mobility Training Limitations: decreased ability to use arms for pushing/pulling;  decreased ability to use legs for bridging/pushing;  decreased flexibility;  decreased ROM;  decreased strength;  impaired balance;  cognitive, decreased safety awareness    Sit-Stand Transfer Training  Transfer Training Sit-to-Stand Transfer: maximum assist (25% patient effort);  2 person assist;  full weight-bearing   no device + helmet, left knee blocked  Transfer Training Stand-to-Sit Transfer: maximum assist (25% patient effort)  Sit-to-Stand Transfer Training Transfer Safety Analysis: decreased balance;  decreased cognition;  decreased flexibility;  decreased ROM;  decreased strength;  impaired balance    12/3 OT  Bed Mobility  Bed Mobility Training Sit-to-Supine: maximum assist (25% patient effort);  2 person assist  Bed Mobility Training Supine-to-Sit: maximum assist (25% patient effort);  2 person assist  Bed Mobility Training Limitations: impaired balance;  impaired coordination;  impaired motor control    Sit-Stand Transfer Training  Transfer Training Sit-to-Stand Transfer: maximum assist (25% patient effort);  2 person assist  Transfer Training Stand-to-Sit Transfer: maximum assist (25% patient effort);  2 person assist  Sit-to-Stand Transfer Training Transfer Safety Analysis: impaired coordination;  impaired motor control    Neuromuscular Re-education  Neuromuscular Re-education Detail: Pt sat at the edge of the bed with minimal assist to maintain static sitting balance after initial max assist. Midline orientation achieved when pt self supported. Occasional ability to maintain static sitting balance self supported. Pt stood for one trial with maximal assist x2, increased assist to stand fully upright. Pt tolerated all well.         VITALS  T(C): 36.9 (12-05-23 @ 08:04), Max: 37.2 (12-04-23 @ 19:00)  HR: 73 (12-05-23 @ 08:00) (71 - 100)  BP: 107/57 (12-05-23 @ 08:00) (105/60 - 136/68)  RR: 15 (12-05-23 @ 08:00) (15 - 22)  SpO2: 98% (12-05-23 @ 08:00) (96% - 99%)  Wt(kg): --    MEDICATIONS   acetaminophen     Tablet .. 650 milliGRAM(s) every 6 hours PRN  atorvastatin 80 milliGRAM(s) at bedtime  chlorhexidine 2% Cloths 1 Application(s) <User Schedule>  diltiazem    Tablet 60 milliGRAM(s) <User Schedule>  doxazosin 1 milliGRAM(s) at bedtime  folic acid 1 milliGRAM(s) daily  hydrALAZINE Injectable 10 milliGRAM(s) every 2 hours PRN  labetalol Injectable 10 milliGRAM(s) every 2 hours PRN  melatonin 6 milliGRAM(s) at bedtime  memantine 5 milliGRAM(s) two times a day  metoprolol tartrate 50 milliGRAM(s) <User Schedule>  modafinil 100 milliGRAM(s) daily  Nephro-roma 1 Tablet(s) daily  polyethylene glycol 3350 17 Gram(s) daily  QUEtiapine 12.5 milliGRAM(s) at bedtime PRN  senna 2 Tablet(s) at bedtime      RECENT LABS/IMAGING  - Reviewed Today                        9.4    8.78  )-----------( 226      ( 05 Dec 2023 06:20 )             28.1     12-05    136  |  103  |  25.4<H>  ----------------------------<  108<H>  4.0   |  21.0<L>  |  1.02    Ca    8.8      05 Dec 2023 06:20        Urinalysis Basic - ( 05 Dec 2023 06:20 )    Color: x / Appearance: x / SG: x / pH: x  Gluc: 108 mg/dL / Ketone: x  / Bili: x / Urobili: x   Blood: x / Protein: x / Nitrite: x   Leuk Esterase: x / RBC: x / WBC x   Sq Epi: x / Non Sq Epi: x / Bacteria: x        MR Brain 11/13 - RIGHT frontoparietal craniectomy with underlying intracranial hemorrhage and edema within the RIGHT frontal lobe. Overlying small RIGHT subdural hemorrhage is also unchanged. Scant hemorrhage in the dependent portions of the BILATERAL lateral ventricles and minimal subarachnoid hemorrhage seen in the BILATERAL frontal and parietal lobes. Moderate periventricular and deep white matter ischemia is noted. Encephalomalacia and gliosis seen in the anterior frontal lobes bilaterally. Tiny acute lacunar infarction in the LEFT cerebellum and tiny acute cortical infarction in the RIGHT parietal lobe. Restricted diffusion also noted about the surgical cavity suggesting infarcted parenchyma.    CXR 11/15 - There is mild pulmonary venous congestion. Left retrocardiac/lower lobe opacity could represent associated pneumonia.    HEAD CT 11/16 - Improved to stable multicompartment intracranial hemorrhages. Right-sided subdural hygroma, larger in size.    US V DOPPLER BLE 11/16 - No evidence of deep venous thrombosis in either lower extremity.    HEAD CT 11/22 - Mixed attenuating subdural hematoma again seen with some expected postoperative changes. Evolving area of parenchymal hemorrhage involving the right frontal region.    HEAD CT 11/23 - Stable multicompartment intracranial hemorrhages. Stable right-sided subdural collection.    HEAD CT 11/29 - Stable intracranial hemorrhages.    HEAD CT 12/2  Right hemicraniectomy. Right subdural fluid attenuation collection as seen on the prior. Decreased conspicuity to regions of acute hemorrhage in the right high frontal parasagittal region as well as stable appearance to hemorrhage in the right insular posterior frontal region in association with lucency as seen on the prior study    ----------------------------------------------------------------------------------------  PHYSICAL EXAM  Constitutional - NAD, Comfortable   HEENT - Right craniectomy - CDI  Extremities - No peripheral edema   Neurologic Exam -                    Cognitive - Somnolent     Communication - Limited vebralizations     Motor - Limited exam  Psych - Somnolent  ----------------------------------------------------------------------------------------  ASSESSMENT/PLAN  83yMale with functional deficits after a fall sustaining multicompartmental intracranial bleeding  Wakefulness - Provigil 100mg Q6AM (12/2)  Alzheimer's Dementia - Namenda 5mg BID, Seroquel PRN   CAD, PAFIB - Cardizem, Lopressor, Lipitor, Hydralazine, Labetalol  HTN - Hydralazine, labetalol   Dysphagia - Pureed/mod thick   Sleep - Melatonin    Oropharyngeal Dysphagia - Soft & MOD THICK Liquids  DVT PPX - SCD   Rehab/Impaired mobility and function - Patient continues to require hospitalization for the above diagnoses and ongoing active management of comorbid complications (pending cranioplasty, IV HTN medications) that are substantially impairing functional ability and impairing quality of life necessitating ongoing medical management of these complications.     When medically optimized, based on the patient's diagnosis, current functional status, and potential for progress, recommend HOLLY, patient DOES NOT meet acute inpatient rehabilitation criteria. Patient needs a more prolonged stay to achieve transition to community living and would not be able to tolerate a comprehensive/intense rehab program of 3hours/day.     Will continue to follow. Rehab recommendations are dependent on how functional progress changes as well as how patient continues to participate and tolerate therapeutic interventions, which may change. Recommend ongoing mobilization by staff to maintain cardiopulmonary function and prevention of secondary complications related to debility. Discussed the specific management and recommendations above with rehab clinical care team/rehab liaison.

## 2023-12-05 NOTE — CHART NOTE - NSCHARTNOTEFT_GEN_A_CORE
Source: Patient [x ]  Family [ ]   other [x ]    Current Diet: Soft & Bite Sized with Moderately Thick Liquids    Patient reports [ ] nausea  [ ] vomiting [ ] diarrhea [ ] constipation  [ ]chewing problems [ ] swallowing issues  [ ] other:     PO intake:  < 50% [ x]   50-75%  [ ]   %  [ ]  other :    Current Weight:   (11/28) 143.5 lbs  (11/27) 141 lbs  (11/23) 145 lbs  (11/22) 134.9 lbs  (11/21) 149.9 lbs  (11/20) 137.3 lbs  (11/18) 140.2 lbs  (11/16) 156.9 lbs  (11/14) 166.6 lbs  (11/13) 158.2 lbs  (11/12) 165.5 lbs  ? accuracy of weights, no edema documented, continue to trend    Pertinent Medications: MEDICATIONS  (STANDING):  atorvastatin 80 milliGRAM(s) Oral at bedtime  folic acid 1 milliGRAM(s) Oral daily  Nephro-roma 1 Tablet(s) Oral daily  polyethylene glycol 3350 17 Gram(s) Oral daily  senna 2 Tablet(s) Oral at bedtime    Pertinent Labs: CBC Full  -  ( 05 Dec 2023 06:20 )  WBC Count : 8.78 K/uL  RBC Count : 2.94 M/uL  Hemoglobin : 9.4 g/dL  Hematocrit : 28.1 %    12-05 Na136 mmol/L Glu 108 mg/dL<H> K+ 4.0 mmol/L Cr  1.02 mg/dL BUN 25.4 mg/dL<H> Phos n/a   Alb n/a   PAB n/a       Skin: Surgical incision - right crani, moisture associated dermatitis, and IAD per documentation  Rivas: 13    Nutrition focused physical exam previously conducted - found signs of malnutrition [ ]absent [x ]present    Subcutaneous fat loss: Mod [x ] Orbital fat pads region, [x ]Buccal fat region, [x ]Triceps region,  [x ]Ribs region    Muscle wasting: Mod [ x]Temples region, [ x]Clavicle region, [x ]Shoulder region, [ ]Scapula region, [ ]Interosseous region,  [ ]thigh region, [ ]Calf region    Estimated Needs:   [x ] no change since previous assessment  [ ] recalculated:     Current Nutrition Diagnosis:  Pt remains at high nutrition risk secondary to severe (acute) protein calorie malnutrition related to inability to meet sufficient protein energy needs in setting of TBI, - R frontal IPH, SDH, tSAH; s/p R decompressive hemicraniectomy 11/10 as evidenced by physical signs of mod muscle/fat loss and meeting < 50% estimated energy needs > 5days, weight loss. Seen by SLP 12/4 with recommendations for an easy to chew diet with moderately thick liquids, +aspiration precautions and 1:1 assistance. Pt with poor po intake. Last BM 12/4. RD to follow up.     Recommendations:   1) Continue diet as tolerated/per SLP recommendations.  2) Will add Magic Cup TID (290 kcal, 9g protein per serving) to optimize po intake.  3) Continue Nephro-Roma and folic acid supplementation.  4) Encourage po intake, monitor diet tolerance, and provide assistance at meals as needed.  5) Obtain daily weights to monitor trends.     Monitoring and Evaluation:   [ x] PO intake [ x] Tolerance to diet prescription [X] Weights  [X] Follow up per protocol [X] Labs: chem 8, mg, phos, H/H

## 2023-12-06 ENCOUNTER — TRANSCRIPTION ENCOUNTER (OUTPATIENT)
Age: 83
End: 2023-12-06

## 2023-12-06 DIAGNOSIS — I63.9 CEREBRAL INFARCTION, UNSPECIFIED: ICD-10-CM

## 2023-12-06 DIAGNOSIS — Z01.810 ENCOUNTER FOR PREPROCEDURAL CARDIOVASCULAR EXAMINATION: ICD-10-CM

## 2023-12-06 LAB
ANION GAP SERPL CALC-SCNC: 12 MMOL/L — SIGNIFICANT CHANGE UP (ref 5–17)
ANION GAP SERPL CALC-SCNC: 12 MMOL/L — SIGNIFICANT CHANGE UP (ref 5–17)
APTT BLD: 31.5 SEC — SIGNIFICANT CHANGE UP (ref 24.5–35.6)
APTT BLD: 31.5 SEC — SIGNIFICANT CHANGE UP (ref 24.5–35.6)
BLD GP AB SCN SERPL QL: SIGNIFICANT CHANGE UP
BLD GP AB SCN SERPL QL: SIGNIFICANT CHANGE UP
BUN SERPL-MCNC: 23 MG/DL — HIGH (ref 8–20)
BUN SERPL-MCNC: 23 MG/DL — HIGH (ref 8–20)
CALCIUM SERPL-MCNC: 9.1 MG/DL — SIGNIFICANT CHANGE UP (ref 8.4–10.5)
CALCIUM SERPL-MCNC: 9.1 MG/DL — SIGNIFICANT CHANGE UP (ref 8.4–10.5)
CHLORIDE SERPL-SCNC: 102 MMOL/L — SIGNIFICANT CHANGE UP (ref 96–108)
CHLORIDE SERPL-SCNC: 102 MMOL/L — SIGNIFICANT CHANGE UP (ref 96–108)
CO2 SERPL-SCNC: 23 MMOL/L — SIGNIFICANT CHANGE UP (ref 22–29)
CO2 SERPL-SCNC: 23 MMOL/L — SIGNIFICANT CHANGE UP (ref 22–29)
CREAT SERPL-MCNC: 0.93 MG/DL — SIGNIFICANT CHANGE UP (ref 0.5–1.3)
CREAT SERPL-MCNC: 0.93 MG/DL — SIGNIFICANT CHANGE UP (ref 0.5–1.3)
EGFR: 82 ML/MIN/1.73M2 — SIGNIFICANT CHANGE UP
EGFR: 82 ML/MIN/1.73M2 — SIGNIFICANT CHANGE UP
GLUCOSE SERPL-MCNC: 115 MG/DL — HIGH (ref 70–99)
GLUCOSE SERPL-MCNC: 115 MG/DL — HIGH (ref 70–99)
HCT VFR BLD CALC: 29.2 % — LOW (ref 39–50)
HCT VFR BLD CALC: 29.2 % — LOW (ref 39–50)
HGB BLD-MCNC: 9.8 G/DL — LOW (ref 13–17)
HGB BLD-MCNC: 9.8 G/DL — LOW (ref 13–17)
INR BLD: 1.13 RATIO — SIGNIFICANT CHANGE UP (ref 0.85–1.18)
INR BLD: 1.13 RATIO — SIGNIFICANT CHANGE UP (ref 0.85–1.18)
MCHC RBC-ENTMCNC: 31.3 PG — SIGNIFICANT CHANGE UP (ref 27–34)
MCHC RBC-ENTMCNC: 31.3 PG — SIGNIFICANT CHANGE UP (ref 27–34)
MCHC RBC-ENTMCNC: 33.6 GM/DL — SIGNIFICANT CHANGE UP (ref 32–36)
MCHC RBC-ENTMCNC: 33.6 GM/DL — SIGNIFICANT CHANGE UP (ref 32–36)
MCV RBC AUTO: 93.3 FL — SIGNIFICANT CHANGE UP (ref 80–100)
MCV RBC AUTO: 93.3 FL — SIGNIFICANT CHANGE UP (ref 80–100)
MRSA PCR RESULT.: SIGNIFICANT CHANGE UP
MRSA PCR RESULT.: SIGNIFICANT CHANGE UP
PLATELET # BLD AUTO: 254 K/UL — SIGNIFICANT CHANGE UP (ref 150–400)
PLATELET # BLD AUTO: 254 K/UL — SIGNIFICANT CHANGE UP (ref 150–400)
POTASSIUM SERPL-MCNC: 3.9 MMOL/L — SIGNIFICANT CHANGE UP (ref 3.5–5.3)
POTASSIUM SERPL-MCNC: 3.9 MMOL/L — SIGNIFICANT CHANGE UP (ref 3.5–5.3)
POTASSIUM SERPL-SCNC: 3.9 MMOL/L — SIGNIFICANT CHANGE UP (ref 3.5–5.3)
POTASSIUM SERPL-SCNC: 3.9 MMOL/L — SIGNIFICANT CHANGE UP (ref 3.5–5.3)
PROTHROM AB SERPL-ACNC: 12.5 SEC — SIGNIFICANT CHANGE UP (ref 9.5–13)
PROTHROM AB SERPL-ACNC: 12.5 SEC — SIGNIFICANT CHANGE UP (ref 9.5–13)
RBC # BLD: 3.13 M/UL — LOW (ref 4.2–5.8)
RBC # BLD: 3.13 M/UL — LOW (ref 4.2–5.8)
RBC # FLD: 14.2 % — SIGNIFICANT CHANGE UP (ref 10.3–14.5)
RBC # FLD: 14.2 % — SIGNIFICANT CHANGE UP (ref 10.3–14.5)
S AUREUS DNA NOSE QL NAA+PROBE: SIGNIFICANT CHANGE UP
S AUREUS DNA NOSE QL NAA+PROBE: SIGNIFICANT CHANGE UP
SODIUM SERPL-SCNC: 137 MMOL/L — SIGNIFICANT CHANGE UP (ref 135–145)
SODIUM SERPL-SCNC: 137 MMOL/L — SIGNIFICANT CHANGE UP (ref 135–145)
WBC # BLD: 9.74 K/UL — SIGNIFICANT CHANGE UP (ref 3.8–10.5)
WBC # BLD: 9.74 K/UL — SIGNIFICANT CHANGE UP (ref 3.8–10.5)
WBC # FLD AUTO: 9.74 K/UL — SIGNIFICANT CHANGE UP (ref 3.8–10.5)
WBC # FLD AUTO: 9.74 K/UL — SIGNIFICANT CHANGE UP (ref 3.8–10.5)

## 2023-12-06 PROCEDURE — 99232 SBSQ HOSP IP/OBS MODERATE 35: CPT

## 2023-12-06 PROCEDURE — 71045 X-RAY EXAM CHEST 1 VIEW: CPT | Mod: 26

## 2023-12-06 RX ADMIN — ATORVASTATIN CALCIUM 80 MILLIGRAM(S): 80 TABLET, FILM COATED ORAL at 22:00

## 2023-12-06 RX ADMIN — CHLORHEXIDINE GLUCONATE 1 APPLICATION(S): 213 SOLUTION TOPICAL at 05:41

## 2023-12-06 RX ADMIN — Medication 1 TABLET(S): at 12:29

## 2023-12-06 RX ADMIN — Medication 50 MILLIGRAM(S): at 00:50

## 2023-12-06 RX ADMIN — CHLORHEXIDINE GLUCONATE 1 APPLICATION(S): 213 SOLUTION TOPICAL at 13:55

## 2023-12-06 RX ADMIN — Medication 60 MILLIGRAM(S): at 17:07

## 2023-12-06 RX ADMIN — MODAFINIL 100 MILLIGRAM(S): 200 TABLET ORAL at 12:28

## 2023-12-06 RX ADMIN — SENNA PLUS 2 TABLET(S): 8.6 TABLET ORAL at 22:00

## 2023-12-06 RX ADMIN — Medication 50 MILLIGRAM(S): at 12:29

## 2023-12-06 RX ADMIN — Medication 1 MILLIGRAM(S): at 12:29

## 2023-12-06 RX ADMIN — Medication 60 MILLIGRAM(S): at 05:35

## 2023-12-06 RX ADMIN — Medication 6 MILLIGRAM(S): at 22:00

## 2023-12-06 NOTE — PROGRESS NOTE ADULT - NS ATTEND AMEND GEN_ALL_CORE FT
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 Jose Anderson in [place] last seen 4/2023, presents with unwitnessed fall with traumatic SDH/SAH POD#2 from R-decompressive hemicraniectomy noted febrile episodes yesterday with leukocytosis, blood cultures sent, TTE done with reports AV lesion. Cardiology reconsulted 12/6 to consider WHIT.    R/O Endocarditis.   Repeat limited echocardiogram nondiagnostic for evaluating the aortic valve.   All blood cultures are NGTD at this time.  No indication for WHIT at this time as no evidence of infection.     At prior note: Per neurology recs, recommending a WHIT to r/o PFO.   There was discordance with daughters wishes for WHIT.  Daughter wanted to think about it if its not urgent.  Cardiology reconsulted 12/6 to consider WHIT.  Need to d/w Neuro in regards to WHIT.    Paroxysmal atrial fibrillation.   Unable to be placed on AC due to recent R hemicraniectomy.   Rate control  Hold eliquis  per NSx. last dose 12/04 am. plan for or tomorrow    IPH - s/p Rt hemicraniotomy likely sec to AC  for DVT/Afib with demonstration of new bleed  Cranioplasty planning for tomorrow 12/7 tentatively. HOLD AC starting 12/4 night.  Strict BP parameters (maintain SBP < 160, avoid rapid fluctuations)    Acute DVT: hold  AC as above, avoid heparin products 2d/t h/o HIT.   b/l scd;s for now per neurosurgery    AAA s/p EVAR   Imaging reviewed by Vascular surgery  No intervention planned at present  Recommended obtaining prior imaging from pts Vascular surgeon. 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 Jose Anderson in [place] last seen 4/2023, presents with unwitnessed fall with traumatic SDH/SAH POD#2 from R-decompressive hemicraniectomy noted febrile episodes yesterday with leukocytosis, blood cultures sent, TTE done with reports AV lesion. Cardiology reconsulted 12/6 to consider WHIT.    R/O Endocarditis.   Repeat limited echocardiogram nondiagnostic for evaluating the aortic valve.   All blood cultures are NGTD at this time.  No indication for WHIT at this time as no evidence of infection.     At prior note: Per neurology recs, recommending a WHIT to r/o PFO.   There was discordance with daughters wishes for WHIT.  Daughter wanted to think about it if its not urgent.  Cardiology reconsulted 12/6 to consider WHIT.  Need to d/w Neuro in regards to WHIT.    Paroxysmal atrial fibrillation.   Unable to be placed on AC due to recent R hemicraniectomy.   Rate control  Hold eliquis  per NSx. last dose 12/04 am. plan for or tomorrow    IPH - s/p Rt hemicraniotomy likely sec to AC  for DVT/Afib with demonstration of new bleed  Cranioplasty planning for tomorrow 12/7 tentatively. HOLD AC starting 12/4 night.  Strict BP parameters (maintain SBP < 160, avoid rapid fluctuations)    Acute DVT: hold  AC as above, avoid heparin products 2d/t h/o HIT.   b/l scd;s for now per neurosurgery    AAA s/p EVAR   Imaging reviewed by Vascular surgery  No intervention planned at present  Recommended obtaining prior imaging from pts Vascular surgeon. 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 Jose Anderson in [place] last seen 4/2023, presents with unwitnessed fall with traumatic SDH/SAH POD#2 from R-decompressive hemicraniectomy noted febrile episodes yesterday with leukocytosis, blood cultures sent, TTE done with reports AV lesion. Cardiology reconsulted 12/6 to consider WHIT.    R/O Endocarditis.   Repeat limited echocardiogram nondiagnostic for evaluating the aortic valve.   All blood cultures are NGTD at this time.  No indication for WHIT at this time as no evidence of infection.     At prior note: Per neurology recs, recommending a WHIT to r/o PFO.   There was discordance with daughters wishes for WHIT.  Daughter wanted to think about it if its not urgent.  Cardiology reconsulted 12/6 to consider WHIT.  I d/w pt and his wife in attendance, refused WHIT. In addition pt's wife stated that the daughter also unwilling WHIT and no change in this decision.   d/w Neuro in regards to WHIT: No WHIT recommended at this time.    Paroxysmal atrial fibrillation.   Unable to be placed on AC due to recent R hemicraniectomy.   Rate control  Hold eliquis  per NSx. last dose 12/04 am. plan for or tomorrow    IPH - s/p Rt hemicraniotomy likely sec to AC  for DVT/Afib with demonstration of new bleed  Cranioplasty planning for tomorrow 12/7 tentatively. HOLD AC starting 12/4 night.  Strict BP parameters (maintain SBP < 160, avoid rapid fluctuations)    Acute DVT: hold  AC as above, avoid heparin products 2d/t h/o HIT.   b/l scd;s for now per neurosurgery    AAA s/p EVAR   Imaging reviewed by Vascular surgery  No intervention planned at present  Recommended obtaining prior imaging from pts Vascular surgeon.    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 Jose Anderson in [place] last seen 4/2023, presents with unwitnessed fall with traumatic SDH/SAH POD#2 from R-decompressive hemicraniectomy noted febrile episodes yesterday with leukocytosis, blood cultures sent, TTE done with reports AV lesion. Cardiology reconsulted 12/6 to consider WHIT.    R/O Endocarditis.   Repeat limited echocardiogram nondiagnostic for evaluating the aortic valve.   All blood cultures are NGTD at this time.  No indication for WHIT at this time as no evidence of infection.     At prior note: Per neurology recs, recommending a WHIT to r/o PFO.   There was discordance with daughters wishes for WHIT.  Daughter wanted to think about it if its not urgent.  Cardiology reconsulted 12/6 to consider WHIT.  I d/w pt and his wife in attendance, refused WHIT. In addition pt's wife stated that the daughter also unwilling WHIT and no change in this decision.   d/w Neuro in regards to WHIT: No WHIT recommended at this time.    Paroxysmal atrial fibrillation.   Unable to be placed on AC due to recent R hemicraniectomy.   Rate control  Hold eliquis  per NSx. last dose 12/04 am. plan for or tomorrow    IPH - s/p Rt hemicraniotomy likely sec to AC  for DVT/Afib with demonstration of new bleed  Cranioplasty planning for tomorrow 12/7 tentatively. HOLD AC starting 12/4 night.  Strict BP parameters (maintain SBP < 160, avoid rapid fluctuations)    Acute DVT: hold  AC as above, avoid heparin products 2d/t h/o HIT.   b/l scd;s for now per neurosurgery    AAA s/p EVAR   Imaging reviewed by Vascular surgery  No intervention planned at present  Recommended obtaining prior imaging from pts Vascular surgeon.    Sill s/o   pls call with any questions

## 2023-12-06 NOTE — PROGRESS NOTE ADULT - SUBJECTIVE AND OBJECTIVE BOX
SUBJECTIVE:  83M w/ PMHX of HTN, CAD s/p stents, RCC s/p L nephrectomy, bladder CA, BPH, CHF, LBBB, vertigo, HLD, 5.5cm AAA without rupture post EVAR, paroxysmal A-fib on Eliquis, Plavix, and aspirin, early stage Alzheimer dementia transferred from [facility] 11/10/23 s/p unwitnessed fall with head strike at home found by wife on floor at 8am. CT head at [place] showed R frontal IPH, SDH, SAH at 9:00. s/p R craniectomy for ICH 11/10. Hospital course complicated by Klebsiella PNA, hypernatremia, lethargy, LUE DVT on Eliquis. Downgraded to stepdown on 11/23, re-upgraded to NSICU on 11/26 for new acute anterior falx SDH. Downgraded back to floor on 11/28, stable.    INTERVAL HPI/OVERNIGHT EVENTS:  Patient seen and examined this morning. No acute overnight events. Plan for cranioplasty on Thursday, 12/7. CTH for pre-op planning completed today yesterday and pre-op labs ordered for tomorrow am.    Vital Signs Last 24 Hrs  T(C): 36.6 (06 Dec 2023 08:00), Max: 36.9 (06 Dec 2023 04:02)  T(F): 97.9 (06 Dec 2023 08:00), Max: 98.5 (06 Dec 2023 04:02)  HR: 76 (06 Dec 2023 10:00) (74 - 105)  BP: 142/81 (06 Dec 2023 10:00) (108/58 - 151/71)  BP(mean): 95 (06 Dec 2023 10:00) (68 - 98)  RR: 18 (06 Dec 2023 10:00) (16 - 22)  SpO2: 98% (06 Dec 2023 10:00) (96% - 100%)    Parameters below as of 06 Dec 2023 10:00  Patient On (Oxygen Delivery Method): room air    Physical Exam:  General: NAD, NCAT, resting comfortably in bed  Cardio: normal S1, S2  Lungs:  respirations unlabored on RA  Neuro: AOX1-2 oriented to place and self, confused on year and situation.  PERRL, EOMI, OE spontaneously, FC on right side, s/p right craniectomy - incision C/D/I, strength: RUE 5/5, RLE 4/5, LUE moves in plane of the bed, LLE w/d, sensation intact on right side, sensation on left side with noxious stimuli only    IMAGING:  < from: CT Head No Cont (12.05.23 @ 11:17) > ACC: [id number] EXAM:  CT BRAIN   ORDERED BY: MANISH BROWNLEE  -PROCEDURE DATE:  12/05/2023      < from: CT Head No Cont (12.05.23 @ 11:17) >  IMPRESSION:   Unchanged evolving hemorrhage within the RIGHT frontal   lobe, medially and laterally with associated edema. RIGHT frontoparietal   hemicraniectomy is again noted with unchanged extra-axial. Mild to   moderate periventricular white matter ischemia noted.    --- End of Report ---    GELY CANELA MD; Attending Radiologist  This document has been electronically signed. Dec  5 2023 12:45PM      < from: CT Head No Cont (12.02.23 @ 05:10) >ACC: [id number] EXAM:  CT BRAIN   ORDERED BY: DALE HERNANDEZ  -PROCEDURE DATE:  12/02/2023    < from: CT Head No Cont (12.02.23 @ 05:10) >    IMPRESSION:  Right hemicraniectomy. Right subdural fluid attenuation   collection as seen on the prior. Decreased conspicuity to regions of   acute hemorrhage in the right high frontal parasagittal region as well as   stable appearance to hemorrhage in the right insular posterior frontal   region in association with lucency as seen on the prior study    --- End of Report ---    ADRIAN UGARTE MD; Attending Radiologist  This document has been electronically signed. Dec  2 2023  9:30AM    < end of copied text >    LABS:                        9.8    9.74  )-----------( 254      ( 06 Dec 2023 05:44 )             29.2       12-06    137  |  102  |  23.0<H>  ----------------------------<  115<H>  3.9   |  23.0  |  0.93    Ca    9.1      06 Dec 2023 05:44          Urinalysis Basic - ( 06 Dec 2023 05:44 )    Color: x / Appearance: x / SG: x / pH: x  Gluc: 115 mg/dL / Ketone: x  / Bili: x / Urobili: x   Blood: x / Protein: x / Nitrite: x   Leuk Esterase: x / RBC: x / WBC x   Sq Epi: x / Non Sq Epi: x / Bacteria: x      PT/INR - ( 06 Dec 2023 05:44 )   PT: 12.5 sec;   INR: 1.13 ratio     PTT - ( 06 Dec 2023 05:44 )  PTT:31.5 sec                   SUBJECTIVE:  83M w/ PMHX of HTN, CAD s/p stents, RCC s/p L nephrectomy, bladder CA, BPH, CHF, LBBB, vertigo, HLD, 5.5cm AAA without rupture post EVAR, paroxysmal A-fib on Eliquis, Plavix, and aspirin, early stage Alzheimer dementia transferred from [facility] 11/10/23 s/p unwitnessed fall with head strike at home found by wife on floor at 8am. CT head at [place] showed R frontal IPH, SDH, SAH at 9:00. s/p R craniectomy for ICH 11/10. Hospital course complicated by Klebsiella PNA, hypernatremia, lethargy, LUE DVT on Eliquis. Downgraded to stepdown on 11/23, re-upgraded to NSICU on 11/26 for new acute anterior falx SDH. Downgraded back to floor on 11/28, stable.    INTERVAL HPI/OVERNIGHT EVENTS:  Patient seen and examined this morning. No acute overnight events. Plan for cranioplasty on Thursday, 12/7. CTH for pre-op planning completed today yesterday and pre-op labs ordered for tomorrow am.    Vital Signs Last 24 Hrs  T(C): 36.6 (06 Dec 2023 08:00), Max: 36.9 (06 Dec 2023 04:02)  T(F): 97.9 (06 Dec 2023 08:00), Max: 98.5 (06 Dec 2023 04:02)  HR: 76 (06 Dec 2023 10:00) (74 - 105)  BP: 142/81 (06 Dec 2023 10:00) (108/58 - 151/71)  BP(mean): 95 (06 Dec 2023 10:00) (68 - 98)  RR: 18 (06 Dec 2023 10:00) (16 - 22)  SpO2: 98% (06 Dec 2023 10:00) (96% - 100%)    Parameters below as of 06 Dec 2023 10:00  Patient On (Oxygen Delivery Method): room air    Physical Exam:  General: NAD, NCAT, resting comfortably in bed  Cardio: normal S1, S2  Lungs:  respirations unlabored on RA  Neuro: AOX1-2 oriented to place and self, confused on year and situation.  PERRL, EOMI, OE spontaneously, FC on right side, s/p right craniectomy - incision C/D/I, strength: RUE 5/5, RLE 4/5, LUE moves in plane of the bed, LLE w/d, sensation intact on right side, sensation on left side with noxious stimuli only    IMAGING:  < from: CT Head No Cont (12.05.23 @ 11:17) > ACC: [id number] EXAM:  CT BRAIN   ORDERED BY: MANISH BROWNLEE  -PROCEDURE DATE:  12/05/2023      < from: CT Head No Cont (12.05.23 @ 11:17) >  IMPRESSION:   Unchanged evolving hemorrhage within the RIGHT frontal   lobe, medially and laterally with associated edema. RIGHT frontoparietal   hemicraniectomy is again noted with unchanged extra-axial. Mild to   moderate periventricular white matter ischemia noted.    --- End of Report ---    GELY CANELA MD; Attending Radiologist  This document has been electronically signed. Dec  5 2023 12:45PM      < from: CT Head No Cont (12.02.23 @ 05:10) >ACC: [id number] EXAM:  CT BRAIN   ORDERED BY: DALE HERNANDEZ  -PROCEDURE DATE:  12/02/2023    < from: CT Head No Cont (12.02.23 @ 05:10) >    IMPRESSION:  Right hemicraniectomy. Right subdural fluid attenuation   collection as seen on the prior. Decreased conspicuity to regions of   acute hemorrhage in the right high frontal parasagittal region as well as   stable appearance to hemorrhage in the right insular posterior frontal   region in association with lucency as seen on the prior study    --- End of Report ---    ADRIAN UGARTE MD; Attending Radiologist  This document has been electronically signed. Dec  2 2023  9:30AM    < end of copied text >    LABS:                        9.8    9.74  )-----------( 254      ( 06 Dec 2023 05:44 )             29.2       12-06    137  |  102  |  23.0<H>  ----------------------------<  115<H>  3.9   |  23.0  |  0.93    Ca    9.1      06 Dec 2023 05:44          Urinalysis Basic - ( 06 Dec 2023 05:44 )    Color: x / Appearance: x / SG: x / pH: x  Gluc: 115 mg/dL / Ketone: x  / Bili: x / Urobili: x   Blood: x / Protein: x / Nitrite: x   Leuk Esterase: x / RBC: x / WBC x   Sq Epi: x / Non Sq Epi: x / Bacteria: x      PT/INR - ( 06 Dec 2023 05:44 )   PT: 12.5 sec;   INR: 1.13 ratio     PTT - ( 06 Dec 2023 05:44 )  PTT:31.5 sec                   SUBJECTIVE:  83M w/ PMHX of HTN, CAD s/p stents, RCC s/p L nephrectomy, bladder CA, BPH, CHF, LBBB, vertigo, HLD, 5.5cm AAA without rupture post EVAR, paroxysmal A-fib on Eliquis, Plavix, and aspirin, early stage Alzheimer dementia transferred from [facility] 11/10/23 s/p unwitnessed fall with head strike at home found by wife on floor at 8am. CT head at [place] showed R frontal IPH, SDH, SAH at 9:00. s/p R craniectomy for ICH 11/10. Hospital course complicated by Klebsiella PNA, hypernatremia, lethargy, LUE DVT on Eliquis. Downgraded to stepdown on 11/23, re-upgraded to NSICU on 11/26 for new acute anterior falx SDH. Downgraded back to floor on 11/28, stable.    INTERVAL HPI/OVERNIGHT EVENTS:  Patient seen and examined this morning. No acute overnight events. Plan for cranioplasty on Thursday, 12/7. CTH for pre-op planning completed today yesterday and pre-op labs ordered for tomorrow am. Patient denies any complaints.    Vital Signs Last 24 Hrs  T(C): 36.6 (06 Dec 2023 08:00), Max: 36.9 (06 Dec 2023 04:02)  T(F): 97.9 (06 Dec 2023 08:00), Max: 98.5 (06 Dec 2023 04:02)  HR: 76 (06 Dec 2023 10:00) (74 - 105)  BP: 142/81 (06 Dec 2023 10:00) (108/58 - 151/71)  BP(mean): 95 (06 Dec 2023 10:00) (68 - 98)  RR: 18 (06 Dec 2023 10:00) (16 - 22)  SpO2: 98% (06 Dec 2023 10:00) (96% - 100%)    Parameters below as of 06 Dec 2023 10:00  Patient On (Oxygen Delivery Method): room air    Physical Exam:  General: NAD, NCAT, resting comfortably in bed  Cardio: normal S1, S2  Lungs:  respirations unlabored on RA  Neuro: AOX1-2 oriented to place and self, confused on year and situation.  PERRL, EOMI, OE spontaneously, FC on right side, s/p right craniectomy - incision C/D/I, strength: RUE 5/5, RLE 4/5, LUE moves in plane of the bed, LLE w/d, sensation intact on right side, sensation on left side with noxious stimuli only    IMAGING:  < from: CT Head No Cont (12.05.23 @ 11:17) > ACC: [id number] EXAM:  CT BRAIN   ORDERED BY: MANISH BROWNLEE  -PROCEDURE DATE:  12/05/2023      < from: CT Head No Cont (12.05.23 @ 11:17) >  IMPRESSION:   Unchanged evolving hemorrhage within the RIGHT frontal   lobe, medially and laterally with associated edema. RIGHT frontoparietal   hemicraniectomy is again noted with unchanged extra-axial. Mild to   moderate periventricular white matter ischemia noted.    --- End of Report ---    GELY CANELA MD; Attending Radiologist  This document has been electronically signed. Dec  5 2023 12:45PM      < from: CT Head No Cont (12.02.23 @ 05:10) >ACC: [id number] EXAM:  CT BRAIN   ORDERED BY: DALE HERNANDEZ  -PROCEDURE DATE:  12/02/2023    < from: CT Head No Cont (12.02.23 @ 05:10) >    IMPRESSION:  Right hemicraniectomy. Right subdural fluid attenuation   collection as seen on the prior. Decreased conspicuity to regions of   acute hemorrhage in the right high frontal parasagittal region as well as   stable appearance to hemorrhage in the right insular posterior frontal   region in association with lucency as seen on the prior study    --- End of Report ---    ADRIAN UGARTE MD; Attending Radiologist  This document has been electronically signed. Dec  2 2023  9:30AM    < end of copied text >    LABS:                        9.8    9.74  )-----------( 254      ( 06 Dec 2023 05:44 )             29.2       12-06    137  |  102  |  23.0<H>  ----------------------------<  115<H>  3.9   |  23.0  |  0.93    Ca    9.1      06 Dec 2023 05:44          Urinalysis Basic - ( 06 Dec 2023 05:44 )    Color: x / Appearance: x / SG: x / pH: x  Gluc: 115 mg/dL / Ketone: x  / Bili: x / Urobili: x   Blood: x / Protein: x / Nitrite: x   Leuk Esterase: x / RBC: x / WBC x   Sq Epi: x / Non Sq Epi: x / Bacteria: x      PT/INR - ( 06 Dec 2023 05:44 )   PT: 12.5 sec;   INR: 1.13 ratio     PTT - ( 06 Dec 2023 05:44 )  PTT:31.5 sec                   SUBJECTIVE:  83M w/ PMHX of HTN, CAD s/p stents, RCC s/p L nephrectomy, bladder CA, BPH, CHF, LBBB, vertigo, HLD, 5.5cm AAA without rupture post EVAR, paroxysmal A-fib on Eliquis, Plavix, and aspirin, early stage Alzheimer dementia transferred from [facility] 11/10/23 s/p unwitnessed fall with head strike at home found by wife on floor at 8am. CT head at [place] showed R frontal IPH, SDH, SAH at 9:00. s/p R craniectomy for ICH 11/10. Hospital course complicated by Klebsiella PNA, hypernatremia, lethargy, LUE DVT on Eliquis. Downgraded to stepdown on 11/23, re-upgraded to NSICU on 11/26 for new acute anterior falx SDH. Downgraded back to floor on 11/28, stable.    INTERVAL HPI/OVERNIGHT EVENTS:  Patient seen and examined this morning. No acute overnight events. Plan for cranioplasty on Thursday, 12/7. CTH for pre-op planning completed today yesterday and pre-op labs ordered for tomorrow am. Patient denies any complaints.    Vital Signs Last 24 Hrs  T(C): 36.6 (06 Dec 2023 08:00), Max: 36.9 (06 Dec 2023 04:02)  T(F): 97.9 (06 Dec 2023 08:00), Max: 98.5 (06 Dec 2023 04:02)  HR: 76 (06 Dec 2023 10:00) (74 - 105)  BP: 142/81 (06 Dec 2023 10:00) (108/58 - 151/71)  BP(mean): 95 (06 Dec 2023 10:00) (68 - 98)  RR: 18 (06 Dec 2023 10:00) (16 - 22)  SpO2: 98% (06 Dec 2023 10:00) (96% - 100%)    Parameters below as of 06 Dec 2023 10:00  Patient On (Oxygen Delivery Method): room air    Physical Exam:  General: NAD, NCAT, resting comfortably in bed  Cardio: normal S1, S2  Lungs:  respirations unlabored on RA  Neuro: AOX1-2 oriented to place and self, confused on year and situation.  PERRL, EOMI, OE spontaneously, FC on right side, s/p right craniectomy - incision C/D/I, strength: RUE 5/5, RLE 4/5, LUE moves in plane of the bed, LLE w/d, sensation intact on right side, sensation on left side with noxious stimuli only    IMAGING:  < from: CT Head No Cont (12.05.23 @ 11:17) > ACC: [id number] EXAM:  CT BRAIN   ORDERED BY: MANISH BROWNLEE  -PROCEDURE DATE:  12/05/2023      < from: CT Head No Cont (12.05.23 @ 11:17) >  IMPRESSION:   Unchanged evolving hemorrhage within the RIGHT frontal   lobe, medially and laterally with associated edema. RIGHT frontoparietal   hemicraniectomy is again noted with unchanged extra-axial. Mild to   moderate periventricular white matter ischemia noted.    --- End of Report ---    GELY CANELA MD; Attending Radiologist  This document has been electronically signed. Dec  5 2023 12:45PM      < from: CT Head No Cont (12.02.23 @ 05:10) >ACC: [id number] EXAM:  CT BRAIN   ORDERED BY: DALE HERNANDEZ  -PROCEDURE DATE:  12/02/2023    < from: CT Head No Cont (12.02.23 @ 05:10) >    IMPRESSION:  Right hemicraniectomy. Right subdural fluid attenuation   collection as seen on the prior. Decreased conspicuity to regions of   acute hemorrhage in the right high frontal parasagittal region as well as   stable appearance to hemorrhage in the right insular posterior frontal   region in association with lucency as seen on the prior study    --- End of Report ---    ADRIAN UGARTE MD; Attending Radiologist  This document has been electronically signed. Dec  2 2023  9:30AM    < end of copied text >    LABS:                        9.8    9.74  )-----------( 254      ( 06 Dec 2023 05:44 )             29.2       12-06    137  |  102  |  23.0<H>  ----------------------------<  115<H>  3.9   |  23.0  |  0.93    Ca    9.1      06 Dec 2023 05:44          Urinalysis Basic - ( 06 Dec 2023 05:44 )    Color: x / Appearance: x / SG: x / pH: x  Gluc: 115 mg/dL / Ketone: x  / Bili: x / Urobili: x   Blood: x / Protein: x / Nitrite: x   Leuk Esterase: x / RBC: x / WBC x   Sq Epi: x / Non Sq Epi: x / Bacteria: x      PT/INR - ( 06 Dec 2023 05:44 )   PT: 12.5 sec;   INR: 1.13 ratio     PTT - ( 06 Dec 2023 05:44 )  PTT:31.5 sec                   Female

## 2023-12-06 NOTE — PROGRESS NOTE ADULT - ASSESSMENT
83M with HTN, CAD s/p PCO, RCC s/p left nephrectomy, bladder Ca, BPH, CHF, Vertigo, HLD, AAA s/p EVAR, parox Afib, h/o HIT, Alzheimer transferred s/p fall with Rt frontal IPH. Initially had hemicraniectomy course complicated by RVR, asp PNA, hypoxic resp failure, LUE DVT and questionable increase in AAA size. Started on AC with waxing/waning mental status prompting head imaging with evidence of new bleed. Now downgrade to Medicine.     IPH -s/p Rt hemicraniotomy   -likely sec to AC  for DVT/Afib with demonstration of new bleed  -repeat CTH 12/02/23,stable  -Q2 neuro checks  -SBP goal normotensive 100-160  -Continue amantadine, memantine   -Cranioplasty planning for tomorrow 12/7 tentatively. HOLD AC starting 12/4 night.  -Strict BP parameters (maintain SBP < 160, avoid rapid fluctuations)  -Continue modified diet . MB STUDY per speech order  -Helmet to be worn when OOB when OOB      P. Afib/ HTN/HLD   -Continue Metoprolol/diltiazem BID  -Continue Statin   -Labetalol / Hydralazine PRN   -Hold eliquis  per NSx. last dose 12/04 am.plan for or tomorrow        Questionable veg on TTE - discussed with iD- recommends WHIT  -family is hesitant discussed with wife - await call back from daughter  -f/u with Cardiology to determine if plan for WHIT     Acute DVT   -hold  AC as above, avoid heparin products 2/2 h/o HIT  -b/l scd;s for now per neurosurgery    Normocytic Anemia   Stools visualized. Normal color, no melena / evidence of GI blood loss  Repeat in am along with T&S,  transfuse to goal > 8 given h/o CAD    AAA s/p EVAR   Imaging reviewed by Vascular surgery  No intervention planned at present  Recommended obtaining prior imaging from pts Vascular surgeon (Dr Heredia for comparison)    DONNIE- resolved   Suspect may have CKD II  Monitor and renally dose all medications   hall maintained, TOV when more ambulatory/ mental status improved     Acute hypoxic resp failure 2/2 suspected asp PNA   Resolved   s/p completion of IV Abx   ID recs reviewed  dc namenda/cardura---family does not want--per family made pt confused in past.  called family-left msg  bebeto rn 83M with HTN, CAD s/p PCO, RCC s/p left nephrectomy, bladder Ca, BPH, CHF, Vertigo, HLD, AAA s/p EVAR, parox Afib, h/o HIT, Alzheimer transferred s/p fall with Rt frontal IPH. Initially had hemicraniectomy course complicated by RVR, asp PNA, hypoxic resp failure, LUE DVT and questionable increase in AAA size. Started on AC with waxing/waning mental status prompting head imaging with evidence of new bleed. Now downgrade to Medicine.     IPH -s/p Rt hemicraniotomy   -likely sec to AC  for DVT/Afib with demonstration of new bleed  -repeat CTH 12/02/23,stable  -Q2 neuro checks  -SBP goal normotensive 100-160  -Continue amantadine, memantine   -Cranioplasty planning for tomorrow 12/7 tentatively. HOLD AC starting 12/4 night.  -Strict BP parameters (maintain SBP < 160, avoid rapid fluctuations)  -Continue modified diet . MB STUDY per speech order  -Helmet to be worn when OOB when OOB      P. Afib/ HTN/HLD   -Continue Metoprolol/diltiazem BID  -Continue Statin   -Labetalol / Hydralazine PRN   -Hold eliquis  per NSx. last dose 12/04 am.plan for or tomorrow        Questionable veg on TTE - discussed with iD- recommends WHIT  -Per neurology recs, recommending a WHIT to r/o PFO. Cardiology spoke with patient's daughter, Jennifer ([phone]) regarding WHIT and indications for WHIT. Per daughter, she was questioning whether a WHIT was performed or not. Per review of outpatient chart, no WHIT was performed.Daughter wants to think about it.she will get back us if she wants to do TTE.  -Spoke with  Cardiology team,will f/u further rec.    Acute DVT   -hold  AC as above, avoid heparin products 2/2 h/o HIT  -b/l scd;s for now per neurosurgery    Normocytic Anemia   Stools visualized. Normal color, no melena / evidence of GI blood loss  Repeat in am along with T&S,  transfuse to goal > 8 given h/o CAD    AAA s/p EVAR   Imaging reviewed by Vascular surgery  No intervention planned at present  Recommended obtaining prior imaging from pts Vascular surgeon (Dr Heredia for comparison)    DONNIE- resolved   Suspect may have CKD II  Monitor and renally dose all medications   hall maintained, TOV when more ambulatory/ mental status improved     Acute hypoxic resp failure 2/2 suspected asp PNA   Resolved   s/p completion of IV Abx   ID recs reviewed  dc namenda/cardura---family does not want--per family made pt confused in past.  called family-left msg  bebeto cardio about procedure tomorrow.will f//u any rec pre/post op.  bebeto rn 83M with HTN, CAD s/p PCO, RCC s/p left nephrectomy, bladder Ca, BPH, CHF, Vertigo, HLD, AAA s/p EVAR, parox Afib, h/o HIT, Alzheimer transferred s/p fall with Rt frontal IPH. Initially had hemicraniectomy course complicated by RVR, asp PNA, hypoxic resp failure, LUE DVT and questionable increase in AAA size. Started on AC with waxing/waning mental status prompting head imaging with evidence of new bleed. Now downgrade to Medicine.     IPH -s/p Rt hemicraniotomy   -likely sec to AC  for DVT/Afib with demonstration of new bleed  -repeat CTH 12/02/23,stable  -Q2 neuro checks  -SBP goal normotensive 100-160  -Continue amantadine, memantine   -Cranioplasty planning for tomorrow 12/7 tentatively. HOLD AC starting 12/4 night.  -Strict BP parameters (maintain SBP < 160, avoid rapid fluctuations)  -Continue modified diet . MB STUDY per speech order  -Helmet to be worn when OOB when OOB      P. Afib/ HTN/HLD   -Continue Metoprolol/diltiazem BID  -Continue Statin   -Labetalol / Hydralazine PRN   -Hold eliquis  per NSx. last dose 12/04 am.plan for or tomorrow        Questionable veg on TTE - discussed with iD- recommends WHIT  -Per neurology recs, recommending a WHIT to r/o PFO. Cardiology spoke with patient's daughter, Jennifer ([phone]) regarding WHIT and indications for WHIT. Per daughter, she was questioning whether a WHIT was performed or not. Per review of outpatient chart, no WHIT was performed.Daughter wants to think about it.she will get back us if she wants to do TTE.  -Spoke with  Cardiology team,will f/u further rec.    Acute DVT   -hold  AC as above, avoid heparin products 2/2 h/o HIT  -b/l scd;s for now per neurosurgery    Normocytic Anemia   Stools visualized. Normal color, no melena / evidence of GI blood loss  Repeat in am along with T&S,  transfuse to goal > 8 given h/o CAD    AAA s/p EVAR   Imaging reviewed by Vascular surgery  No intervention planned at present  Recommended obtaining prior imaging from pts Vascular surgeon (Dr Heredia for comparison)    DONNIE- resolved   Suspect may have CKD II  Monitor and renally dose all medications   hall maintained, TOV when more ambulatory/ mental status improved     Acute hypoxic resp failure 2/2 suspected asp PNA   Resolved   s/p completion of IV Abx   ID recs reviewed  dc namenda/cardura---family does not want--per family made pt confused in past.  called family-left msg  bebeto cardio about procedure tomorrow.will f//u any rec pre/post op.  bebeto rn 83M with HTN, CAD s/p PCO, RCC s/p left nephrectomy, bladder Ca, BPH, CHF, Vertigo, HLD, AAA s/p EVAR, parox Afib, h/o HIT, Alzheimer transferred s/p fall with Rt frontal IPH. Initially had hemicraniectomy course complicated by RVR, asp PNA, hypoxic resp failure, LUE DVT and questionable increase in AAA size. Started on AC with waxing/waning mental status prompting head imaging with evidence of new bleed. Now downgrade to Medicine.     IPH -s/p Rt hemicraniotomy   -likely sec to AC  for DVT/Afib with demonstration of new bleed  -repeat CTH 12/02/23,stable  -Q2 neuro checks  -SBP goal normotensive 100-160  -Continue amantadine, memantine   -Cranioplasty planning for tomorrow 12/7 tentatively. HOLD AC starting 12/4 night.  -Strict BP parameters (maintain SBP < 160, avoid rapid fluctuations)  -Continue modified diet . MB STUDY per speech order  -Helmet to be worn when OOB when OOB      P. Afib/ HTN/HLD   -Continue Metoprolol/diltiazem BID  -Continue Statin   -Labetalol / Hydralazine PRN   -Hold eliquis  per NSx. last dose 12/04 am.plan for or tomorrow        Questionable veg on TTE - discussed with iD- recommends WHIT  -Per neurology recs, recommending a WHIT to r/o PFO. Cardiology spoke with patient's daughter, Jennifer ([phone]) regarding WHIT and indications for WHIT. Per daughter, she was questioning whether a WHIT was performed or not. Per review of outpatient chart, no WHIT was performed.Daughter wants to think about it.she will get back us if she wants to do TTE.  -Spoke with  Cardiology team,will f/u further rec.    Acute DVT   -hold  AC as above, avoid heparin products 2/2 h/o HIT  -b/l scd;s for now per neurosurgery    Normocytic Anemia   Stools visualized. Normal color, no melena / evidence of GI blood loss  Repeat in am along with T&S,  transfuse to goal > 8 given h/o CAD    AAA s/p EVAR   Imaging reviewed by Vascular surgery  No intervention planned at present  Recommended obtaining prior imaging from pts Vascular surgeon (Dr Heredia for comparison)    DONNIE- resolved   Suspect may have CKD II  Monitor and renally dose all medications   hall maintained, TOV when more ambulatory/ mental status improved     Acute hypoxic resp failure 2/2 suspected asp PNA   Resolved   s/p completion of IV Abx   ID recs reviewed  dc namenda/cardura---family does not want--per family made pt confused in past.  spoke with wife at bedside and daughter Jennifer over phone.Update plan of care.  bebeto cardio about procedure tomorrow.will f//u any rec pre/post op.  bebeto rn 83M with HTN, CAD s/p PCO, RCC s/p left nephrectomy, bladder Ca, BPH, CHF, Vertigo, HLD, AAA s/p EVAR, parox Afib, h/o HIT, Alzheimer transferred s/p fall with Rt frontal IPH. Initially had hemicraniectomy course complicated by RVR, asp PNA, hypoxic resp failure, LUE DVT and questionable increase in AAA size. Started on AC with waxing/waning mental status prompting head imaging with evidence of new bleed. Now downgrade to Medicine.     IPH -s/p Rt hemicraniotomy   -likely sec to AC  for DVT/Afib with demonstration of new bleed  -repeat CTH 12/02/23,stable  -Q2 neuro checks  -SBP goal normotensive 100-160  -Continue amantadine, memantine   -Cranioplasty planning for tomorrow 12/7 tentatively. HOLD AC starting 12/4 night.  -Strict BP parameters (maintain SBP < 160, avoid rapid fluctuations)  -Continue modified diet . MB STUDY per speech order  -Helmet to be worn when OOB when OOB      P. Afib/ HTN/HLD   -Continue Metoprolol/diltiazem BID  -Continue Statin   -Labetalol / Hydralazine PRN   -Hold eliquis  per NSx. last dose 12/04 am.plan for or tomorrow        Questionable veg on TTE - discussed with iD- recommends WHIT  -Per neurology recs, recommending a WHIT to r/o PFO. Cardiology spoke with patient's daughter, Jennifer ([phone]) regarding WHIT and indications for WHIT. Per daughter, she was questioning whether a WHIT was performed or not. Per review of outpatient chart, no WHIT was performed.Daughter wants to think about it.she will get back us if she wants to do TTE.  -Spoke with  Cardiology team,will f/u further rec.    Acute DVT   -hold  AC as above, avoid heparin products 2/2 h/o HIT  -b/l scd;s for now per neurosurgery    Normocytic Anemia   Stools visualized. Normal color, no melena / evidence of GI blood loss  Repeat in am along with T&S,  transfuse to goal > 8 given h/o CAD    AAA s/p EVAR   Imaging reviewed by Vascular surgery  No intervention planned at present  Recommended obtaining prior imaging from pts Vascular surgeon (Dr Heredia for comparison)    DONNIE- resolved   Suspect may have CKD II  Monitor and renally dose all medications   hall maintained, TOV when more ambulatory/ mental status improved     Acute hypoxic resp failure 2/2 suspected asp PNA   Resolved   s/p completion of IV Abx   ID recs reviewed  dc namenda/cardura---family does not want--per family made pt confused in past.  spoke with wife at bedside and daughter Jennifer over phone.Update plan of care.  bebeto cardio about procedure tomorrow.will f//u any rec pre/post op.  bebeto rn

## 2023-12-06 NOTE — PROGRESS NOTE ADULT - SUBJECTIVE AND OBJECTIVE BOX
[facility]                                                         CARDIOLOGY AT [place] [address]                                                         Telephone: [phone]. Fax:[phone]                                                                             PROGRESS NOTE    Reason for follow up:   Preop cardiac clearance for Cranioplasty tomorrow.    Update:   Plan for Cranioplasty tomorrow and PMD asking for perop cardiac clearance / optimization    Review of symptoms:   Cardiac:  No chest pain. No dyspnea. No palpitations.  Respiratory: no cough. No dyspnea  Gastrointestinal: No diarrhea. No abdominal pain. No bleeding.   Neuro: No focal neuro complaints.    Vitals:  T(C): 36.4 (12-06-23 @ 16:27), Max: 36.9 (12-06-23 @ 04:02)  HR: 76 (12-06-23 @ 16:27) (74 - 105)  BP: 124/76 (12-06-23 @ 16:27) (113/80 - 149/69)  RR: 20 (12-06-23 @ 16:27) (17 - 22)  SpO2: 98% (12-06-23 @ 16:27) (96% - 100%)  I&O's Summary  05 Dec 2023 07:01  -  06 Dec 2023 07:00  --------------------------------------------------------  IN: 0 mL / OUT: 1600 mL / NET: -1600 mL    Weight (kg): 72.8 (12-06 @ 16:27)    PHYSICAL EXAM:  Appearance: Comfortable.  HEENT:   traumatic / flap incision line.  Normal oral mucosa  Neurologic: A & O x 2, no gross focal deficits.  Cardiovascular: RRR S1 S2, No murmur, no rubs/gallops. No JVD  Respiratory: Lungs clear to auscultation, unlabored   Gastrointestinal:  Soft, Non-tender, + BS  Lower Extremities: + Peripheral Pulses, No clubbing, cyanosis, or edema  Psychiatry: Patient is calm. No agitation.   Skin: warm and dry.    CURRENT CARDIAC MEDICATIONS:  diltiazem    Tablet 60 milliGRAM(s) Oral <User Schedule>  hydrALAZINE Injectable 10 milliGRAM(s) IV Push every 2 hours PRN  labetalol Injectable 10 milliGRAM(s) IV Push every 2 hours PRN  metoprolol tartrate 50 milliGRAM(s) Oral <User Schedule>    CURRENT OTHER MEDICATIONS:  acetaminophen     Tablet .. 650 milliGRAM(s) Oral every 6 hours PRN Temp greater or equal to 38C (100.4F), Mild Pain (1 - 3)  melatonin 6 milliGRAM(s) Oral at bedtime  modafinil 100 milliGRAM(s) Oral daily  QUEtiapine 12.5 milliGRAM(s) Oral at bedtime PRN delirium  polyethylene glycol 3350 17 Gram(s) Oral daily  senna 2 Tablet(s) Oral at bedtime  atorvastatin 80 milliGRAM(s) Oral at bedtime  chlorhexidine 2% Cloths 1 Application(s) Topical <User Schedule>  chlorhexidine 4% Liquid 1 Application(s) Topical daily, Stop order after: 2 Doses  folic acid 1 milliGRAM(s) Oral daily  Nephro-roma 1 Tablet(s) Oral daily      LABS:	 	                          9.8    9.74  )-----------( 254      ( 06 Dec 2023 05:44 )             29.2     12-06    137  |  102  |  23.0<H>  ----------------------------<  115<H>  3.9   |  23.0  |  0.93    Ca    9.1      06 Dec 2023 05:44      PT/INR/PTT ( 06 Dec 2023 05:44 )                       :                       :      12.5         :       31.5                  .        .                   .              .           .       1.13        .                                       Lipid Profile: Date: 11-14 @ 14:00  Total cholesterol 125; Direct LDL: --; HDL: 31; Triglycerides:181     TELEMETRY:  Sr 83, no ectopy noted.     	                                                                [facility]                                                         CARDIOLOGY AT [place] [address]                                                         Telephone: [phone]. Fax:[phone]                                                                             PROGRESS NOTE    Reason for follow up:   Preop cardiac clearance for Cranioplasty tomorrow.    Update:   Plan for Cranioplasty tomorrow and PMD asking for perop cardiac clearance / optimization    Review of symptoms:   Cardiac:  No chest pain. No dyspnea. No palpitations.  Respiratory: no cough. No dyspnea  Gastrointestinal: No diarrhea. No abdominal pain. No bleeding.   Neuro: No focal neuro complaints.    Vitals:  T(C): 36.4 (12-06-23 @ 16:27), Max: 36.9 (12-06-23 @ 04:02)  HR: 76 (12-06-23 @ 16:27) (74 - 105)  BP: 124/76 (12-06-23 @ 16:27) (113/80 - 149/69)  RR: 20 (12-06-23 @ 16:27) (17 - 22)  SpO2: 98% (12-06-23 @ 16:27) (96% - 100%)  I&O's Summary  05 Dec 2023 07:01  -  06 Dec 2023 07:00  --------------------------------------------------------  IN: 0 mL / OUT: 1600 mL / NET: -1600 mL    Weight (kg): 72.8 (12-06 @ 16:27)    PHYSICAL EXAM:  Appearance: Comfortable.  HEENT:   traumatic / flap incision line.  Normal oral mucosa  Neurologic: A & O x 2, no gross focal deficits.  Cardiovascular: RRR S1 S2, No murmur, no rubs/gallops. No JVD  Respiratory: Lungs clear to auscultation, unlabored   Gastrointestinal:  Soft, Non-tender, + BS  Lower Extremities: + Peripheral Pulses, No clubbing, cyanosis, or edema  Psychiatry: Patient is calm. No agitation.   Skin: warm and dry.    CURRENT CARDIAC MEDICATIONS:  diltiazem    Tablet 60 milliGRAM(s) Oral <User Schedule>  hydrALAZINE Injectable 10 milliGRAM(s) IV Push every 2 hours PRN  labetalol Injectable 10 milliGRAM(s) IV Push every 2 hours PRN  metoprolol tartrate 50 milliGRAM(s) Oral <User Schedule>    CURRENT OTHER MEDICATIONS:  acetaminophen     Tablet .. 650 milliGRAM(s) Oral every 6 hours PRN Temp greater or equal to 38C (100.4F), Mild Pain (1 - 3)  melatonin 6 milliGRAM(s) Oral at bedtime  modafinil 100 milliGRAM(s) Oral daily  QUEtiapine 12.5 milliGRAM(s) Oral at bedtime PRN delirium  polyethylene glycol 3350 17 Gram(s) Oral daily  senna 2 Tablet(s) Oral at bedtime  atorvastatin 80 milliGRAM(s) Oral at bedtime  chlorhexidine 2% Cloths 1 Application(s) Topical <User Schedule>  chlorhexidine 4% Liquid 1 Application(s) Topical daily, Stop order after: 2 Doses  folic acid 1 milliGRAM(s) Oral daily  Nephro-roma 1 Tablet(s) Oral daily      LABS:	 	                          9.8    9.74  )-----------( 254      ( 06 Dec 2023 05:44 )             29.2     12-06    137  |  102  |  23.0<H>  ----------------------------<  115<H>  3.9   |  23.0  |  0.93    Ca    9.1      06 Dec 2023 05:44      PT/INR/PTT ( 06 Dec 2023 05:44 )                       :                       :      12.5         :       31.5                  .        .                   .              .           .       1.13        .                                       Lipid Profile: Date: 11-14 @ 14:00  Total cholesterol 125; Direct LDL: --; HDL: 31; Triglycerides:181     TELEMETRY:  Sr 83, no ectopy noted.     	                                                                [facility]                                                         CARDIOLOGY AT [place] [address]                                                         Telephone: [phone]. Fax:[phone]                                                                             PROGRESS NOTE    Reason for follow up:  MD called requesting WHIT for possible endocarditis/stroke and  Preop cardiac clearance for Cranioplasty tomorrow.    Update:   Plan for Cranioplasty tomorrow and PMD asking for perop cardiac clearance / optimization    Review of symptoms:   Cardiac:  No chest pain. No dyspnea. No palpitations.  Respiratory: no cough. No dyspnea  Gastrointestinal: No diarrhea. No abdominal pain. No bleeding.   Neuro: No focal neuro complaints.    Vitals:  T(C): 36.4 (12-06-23 @ 16:27), Max: 36.9 (12-06-23 @ 04:02)  HR: 76 (12-06-23 @ 16:27) (74 - 105)  BP: 124/76 (12-06-23 @ 16:27) (113/80 - 149/69)  RR: 20 (12-06-23 @ 16:27) (17 - 22)  SpO2: 98% (12-06-23 @ 16:27) (96% - 100%)  I&O's Summary  05 Dec 2023 07:01  -  06 Dec 2023 07:00  --------------------------------------------------------  IN: 0 mL / OUT: 1600 mL / NET: -1600 mL    Weight (kg): 72.8 (12-06 @ 16:27)    PHYSICAL EXAM:  Appearance: Comfortable.  HEENT:   traumatic / flap incision line.  Normal oral mucosa  Neurologic: A & O x 2, no gross focal deficits.  Cardiovascular: RRR S1 S2, No murmur, no rubs/gallops. No JVD  Respiratory: Lungs clear to auscultation, unlabored   Gastrointestinal:  Soft, Non-tender, + BS  Lower Extremities: + Peripheral Pulses, No clubbing, cyanosis, or edema  Psychiatry: Patient is calm. No agitation.   Skin: warm and dry.    CURRENT CARDIAC MEDICATIONS:  diltiazem    Tablet 60 milliGRAM(s) Oral <User Schedule>  hydrALAZINE Injectable 10 milliGRAM(s) IV Push every 2 hours PRN  labetalol Injectable 10 milliGRAM(s) IV Push every 2 hours PRN  metoprolol tartrate 50 milliGRAM(s) Oral <User Schedule>    CURRENT OTHER MEDICATIONS:  acetaminophen     Tablet .. 650 milliGRAM(s) Oral every 6 hours PRN Temp greater or equal to 38C (100.4F), Mild Pain (1 - 3)  melatonin 6 milliGRAM(s) Oral at bedtime  modafinil 100 milliGRAM(s) Oral daily  QUEtiapine 12.5 milliGRAM(s) Oral at bedtime PRN delirium  polyethylene glycol 3350 17 Gram(s) Oral daily  senna 2 Tablet(s) Oral at bedtime  atorvastatin 80 milliGRAM(s) Oral at bedtime  chlorhexidine 2% Cloths 1 Application(s) Topical <User Schedule>  chlorhexidine 4% Liquid 1 Application(s) Topical daily, Stop order after: 2 Doses  folic acid 1 milliGRAM(s) Oral daily  Nephro-roma 1 Tablet(s) Oral daily      LABS:	 	                          9.8    9.74  )-----------( 254      ( 06 Dec 2023 05:44 )             29.2     12-06    137  |  102  |  23.0<H>  ----------------------------<  115<H>  3.9   |  23.0  |  0.93    Ca    9.1      06 Dec 2023 05:44      PT/INR/PTT ( 06 Dec 2023 05:44 )                       :                       :      12.5         :       31.5                  .        .                   .              .           .       1.13        .                                       Lipid Profile: Date: 11-14 @ 14:00  Total cholesterol 125; Direct LDL: --; HDL: 31; Triglycerides:181     TELEMETRY:  Sr 83, no ectopy noted.     	                                                                [facility]                                                         CARDIOLOGY AT [place] [address]                                                         Telephone: [phone]. Fax:[phone]                                                                             PROGRESS NOTE    Reason for follow up:  MD called requesting WHIT for possible endocarditis/stroke and  Preop cardiac clearance for Cranioplasty tomorrow.    Update:   Plan for Cranioplasty tomorrow and PMD asking for perop cardiac clearance / optimization    Review of symptoms:   Cardiac:  No chest pain. No dyspnea. No palpitations.  Respiratory: no cough. No dyspnea  Gastrointestinal: No diarrhea. No abdominal pain. No bleeding.   Neuro: No focal neuro complaints.    Vitals:  T(C): 36.4 (12-06-23 @ 16:27), Max: 36.9 (12-06-23 @ 04:02)  HR: 76 (12-06-23 @ 16:27) (74 - 105)  BP: 124/76 (12-06-23 @ 16:27) (113/80 - 149/69)  RR: 20 (12-06-23 @ 16:27) (17 - 22)  SpO2: 98% (12-06-23 @ 16:27) (96% - 100%)  I&O's Summary  05 Dec 2023 07:01  -  06 Dec 2023 07:00  --------------------------------------------------------  IN: 0 mL / OUT: 1600 mL / NET: -1600 mL    Weight (kg): 72.8 (12-06 @ 16:27)    PHYSICAL EXAM:  Appearance: Comfortable.  HEENT:   traumatic / flap incision line.  Normal oral mucosa  Neurologic: A & O x 2, no gross focal deficits.  Cardiovascular: RRR S1 S2, No murmur, no rubs/gallops. No JVD  Respiratory: Lungs clear to auscultation, unlabored   Gastrointestinal:  Soft, Non-tender, + BS  Lower Extremities: + Peripheral Pulses, No clubbing, cyanosis, or edema  Psychiatry: Patient is calm. No agitation.   Skin: warm and dry.    CURRENT CARDIAC MEDICATIONS:  diltiazem    Tablet 60 milliGRAM(s) Oral <User Schedule>  hydrALAZINE Injectable 10 milliGRAM(s) IV Push every 2 hours PRN  labetalol Injectable 10 milliGRAM(s) IV Push every 2 hours PRN  metoprolol tartrate 50 milliGRAM(s) Oral <User Schedule>    CURRENT OTHER MEDICATIONS:  acetaminophen     Tablet .. 650 milliGRAM(s) Oral every 6 hours PRN Temp greater or equal to 38C (100.4F), Mild Pain (1 - 3)  melatonin 6 milliGRAM(s) Oral at bedtime  modafinil 100 milliGRAM(s) Oral daily  QUEtiapine 12.5 milliGRAM(s) Oral at bedtime PRN delirium  polyethylene glycol 3350 17 Gram(s) Oral daily  senna 2 Tablet(s) Oral at bedtime  atorvastatin 80 milliGRAM(s) Oral at bedtime  chlorhexidine 2% Cloths 1 Application(s) Topical <User Schedule>  chlorhexidine 4% Liquid 1 Application(s) Topical daily, Stop order after: 2 Doses  folic acid 1 milliGRAM(s) Oral daily  Nephro-roma 1 Tablet(s) Oral daily      LABS:	 	                          9.8    9.74  )-----------( 254      ( 06 Dec 2023 05:44 )             29.2     12-06    137  |  102  |  23.0<H>  ----------------------------<  115<H>  3.9   |  23.0  |  0.93    Ca    9.1      06 Dec 2023 05:44      PT/INR/PTT ( 06 Dec 2023 05:44 )                       :                       :      12.5         :       31.5                  .        .                   .              .           .       1.13        .                                       Lipid Profile: Date: 11-14 @ 14:00  Total cholesterol 125; Direct LDL: --; HDL: 31; Triglycerides:181     TELEMETRY:  Sr 83, no ectopy noted.

## 2023-12-06 NOTE — PROGRESS NOTE ADULT - PROBLEM SELECTOR PLAN 1
Patient in currently in SR, 83 with no acute alarms noted.   Eliquis on hold as per neuro for surgery

## 2023-12-06 NOTE — PROGRESS NOTE ADULT - ASSESSMENT
Assessment: 83M w/ PMHS of HTN, CAD s/p stents, RCC s/p L nephrectomy, bladder CA, BPH, CHF, LBBB, vertigo, HLD, 5.5cm AAA without rupture post EVAR, paroxysmal A-fib on Eliquis, Plavix, and aspirin, early stage Alzheimer dementia transferred from [facility] 11/10/23 s/p unwitnessed fall with head strike at home found by wife on floor at 8am. CT head at [place] showed R frontal IPH, SDH, SAH at 9:00. s/p R craniectomy for ICH 11/10. Pending cranioplasty on Thursday, 12/7    PLAN:  - Imaging reviewed   - Q2 hour neuro checks w/ protected sleep   - Helmet to be worn when OOB   - Amantadine 150mg QAM  - Memantine 5mg BID   - Pain control PRN, avoid oversedation  - STAT CTH for any decline in neuro exam   - SBP goal normotensive 100-160  - Cranioplasty planned for Thursday 12/7, flap sunken and soft, CTH done and sent to [facility], likely delivery on 12/6  - Pre-op CXR ordered, Pre-op labs ordered for AM, cleared by medicine team for OR  - b/l SCDs, hold Eliquis   - Further medical management/supportive care per primary team     Case Discussed case w/ Dr. Ric lucia.  Assessment: 83M w/ PMHS of HTN, CAD s/p stents, RCC s/p L nephrectomy, bladder CA, BPH, CHF, LBBB, vertigo, HLD, 5.5cm AAA without rupture post EVAR, paroxysmal A-fib on Eliquis, Plavix, and aspirin, early stage Alzheimer dementia transferred from [facility] 11/10/23 s/p unwitnessed fall with head strike at home found by wife on floor at 8am. CT head at [place] showed R frontal IPH, SDH, SAH at 9:00. s/p R craniectomy for ICH 11/10. Pending cranioplasty on Thursday, 12/7    PLAN:  - Imaging reviewed   - Q2 hour neuro checks w/ protected sleep   - Helmet to be worn when OOB   - Amantadine 150mg QAM  - Memantine 5mg BID   - Pain control PRN, avoid oversedation  - STAT CTH for any decline in neuro exam   - SBP goal normotensive 100-160  - Cranioplasty planned for Thursday 12/7, flap sunken and soft, CTH done and sent to [facility], likely delivery on 12/6  - Pre-op CXR ordered, Pre-op labs ordered for AM, cleared by medicine team for OR  - b/l SCDs, hold Eliquis   - Further medical management/supportive care per primary team     Case Discussed case w/ Dr. Ric lucia.

## 2023-12-06 NOTE — PROGRESS NOTE ADULT - SUBJECTIVE AND OBJECTIVE BOX
Patient is a 83y old  Male who presents with a chief complaint of s/p unwitnessed fall with head strike (06 Dec 2023 10:14)      No acute change    REVIEW OF SYSTEMS: not reliable for mental status    MEDICATIONS  (STANDING):  atorvastatin 80 milliGRAM(s) Oral at bedtime  chlorhexidine 2% Cloths 1 Application(s) Topical <User Schedule>  chlorhexidine 4% Liquid 1 Application(s) Topical daily  diltiazem    Tablet 60 milliGRAM(s) Oral <User Schedule>  folic acid 1 milliGRAM(s) Oral daily  melatonin 6 milliGRAM(s) Oral at bedtime  metoprolol tartrate 50 milliGRAM(s) Oral <User Schedule>  modafinil 100 milliGRAM(s) Oral daily  Nephro-roma 1 Tablet(s) Oral daily  polyethylene glycol 3350 17 Gram(s) Oral daily  senna 2 Tablet(s) Oral at bedtime    MEDICATIONS  (PRN):  acetaminophen     Tablet .. 650 milliGRAM(s) Oral every 6 hours PRN Temp greater or equal to 38C (100.4F), Mild Pain (1 - 3)  hydrALAZINE Injectable 10 milliGRAM(s) IV Push every 2 hours PRN SBP>160  labetalol Injectable 10 milliGRAM(s) IV Push every 2 hours PRN SBP>160  QUEtiapine 12.5 milliGRAM(s) Oral at bedtime PRN delirium      CAPILLARY BLOOD GLUCOSE        I&O's Summary    05 Dec 2023 07:01  -  06 Dec 2023 07:00  --------------------------------------------------------  IN: 0 mL / OUT: 1600 mL / NET: -1600 mL        PHYSICAL EXAM:  Vital Signs Last 24 Hrs  T(C): 36.4 (06 Dec 2023 13:00), Max: 36.9 (06 Dec 2023 04:02)  T(F): 97.6 (06 Dec 2023 13:00), Max: 98.5 (06 Dec 2023 04:02)  HR: 76 (06 Dec 2023 10:00) (74 - 105)  BP: 142/81 (06 Dec 2023 10:00) (113/80 - 142/84)  BP(mean): 95 (06 Dec 2023 10:00) (68 - 98)  RR: 18 (06 Dec 2023 10:00) (17 - 22)  SpO2: 98% (06 Dec 2023 10:00) (96% - 100%)    Parameters below as of 06 Dec 2023 10:00  Patient On (Oxygen Delivery Method): room air        CONSTITUTIONAL: NAD,  EYES: PERRLA; conjunctiva and sclera clear  ENMT: Moist oral mucosa,   RESPIRATORY: Normal respiratory effort; lungs are clear to auscultation bilaterally  CARDIOVASCULAR: Regular rate and rhythm, normal S1 and S2, no murmur   EXTS: No lower extremity edema; Peripheral pulses are 2+ bilaterally  ABDOMEN: Nontender to palpation, normoactive bowel sounds, no rebound/guarding;   MUSCLOSKELETAL:   n; no joint swelling or tenderness to palpation  PSYCH: calm  NEUROLOGY: A+O to person, not to place, and time;no acute chnage      LABS:                        9.8    9.74  )-----------( 254      ( 06 Dec 2023 05:44 )             29.2     12-06    137  |  102  |  23.0<H>  ----------------------------<  115<H>  3.9   |  23.0  |  0.93    Ca    9.1      06 Dec 2023 05:44      PT/INR - ( 06 Dec 2023 05:44 )   PT: 12.5 sec;   INR: 1.13 ratio         PTT - ( 06 Dec 2023 05:44 )  PTT:31.5 sec      Urinalysis Basic - ( 06 Dec 2023 05:44 )    Color: x / Appearance: x / SG: x / pH: x  Gluc: 115 mg/dL / Ketone: x  / Bili: x / Urobili: x   Blood: x / Protein: x / Nitrite: x   Leuk Esterase: x / RBC: x / WBC x   Sq Epi: x / Non Sq Epi: x / Bacteria: x          RADIOLOGY & ADDITIONAL TESTS:  Results Reviewed:

## 2023-12-06 NOTE — PROGRESS NOTE ADULT - ASSESSMENT
83M with HTN, CAD s/p PCO, RCC s/p left nephrectomy, bladder Ca, BPH, CHF, Vertigo, HLD, AAA s/p EVAR, parox Afib, h/o HIT, Alzheimer transferred s/p fall with Rt frontal IPH. Initially had hemicraniectomy course complicated by RVR, asp PNA, hypoxic resp failure, LUE DVT and questionable increase in AAA size. Started on AC with waxing/waning mental status prompting head imaging with evidence of new bleed. Now downgrade to Medicine.

## 2023-12-06 NOTE — PROGRESS NOTE ADULT - PROBLEM SELECTOR PLAN 2
-Patient transferred from [facility] 11/10/23 s/p unwitnessed fall with head strike at home.   CT head at [place] showed R frontal IPH, SDH, SAH.  S/p R craniectomy for ICH 11/10.   likely sec to AC  for DVT/Afib with demonstration of new bleed repeat CTH 12/02/23 stable.    -Cranioplasty planning for tomorrow 12/7 tentatively. HOLD AC starting 12/4 night and preop clearance / optimization is requested by PMT  -Neuro requesting Strict BP parameters (maintain SBP < 160, avoid rapid fluctuations)  -Case discussed with Dr. Larios.  -Patient is at an elevated risk due to comorbidities.   HTN, CAD s/p PCO, RCC s/p left nephrectomy, bladder Ca, BPH, CHF, Vertigo, HLD, AAA s/p EVAR, parox Afib, h/o HIT,  and A -Patient transferred from [facility] 11/10/23 s/p unwitnessed fall with head strike at home.   CT head at [place] showed R frontal IPH, SDH, SAH.  S/p R craniectomy for ICH 11/10.   likely sec to AC  for DVT/Afib with demonstration of new bleed repeat CTH 12/02/23 stable.    -Cranioplasty planning for tomorrow 12/7 tentatively. HOLD AC starting 12/4 night and preop clearance / optimization is requested by PMT  -Neuro requesting Strict BP parameters (maintain SBP < 160, avoid rapid fluctuations)  -Case discussed with Dr. Larios.  -Patient is at an elevated risk due to comorbidities.   HTN, CAD s/p PCO, RCC s/p left nephrectomy, bladder Ca, BPH, CHF, Vertigo, HLD, AAA s/p EVAR, parox Afib, h/o HIT,  and A -Patient transferred from [facility] 11/10/23 s/p unwitnessed fall with head strike at home.   CT head at [place] showed R frontal IPH, SDH, SAH.  S/p R craniectomy for ICH 11/10.   likely sec to AC  for DVT/Afib with demonstration of new bleed repeat CTH 12/02/23 stable.    -Cranioplasty planning for tomorrow 12/7 tentatively. HOLD AC starting 12/4 night and preop clearance / optimization is requested by PMT  -Neuro requesting Strict BP parameters (maintain SBP < 160, avoid rapid fluctuations)  -Case discussed with Dr. Larios.  -Patient is at an elevated risk due to comorbidities.   HTN, CAD s/p PCO, RCC s/p left nephrectomy, bladder Ca, BPH, CHF, Vertigo, HLD, AAA s/p EVAR, parox Afib, h/o HIT,  and A  - Neurology recs, recommending a WHIT to r/o PFO.   As of now daughter is undecided and will need to discuss further  -ID at this time is not recommending WHIT/blood cultures have been negative. -Patient transferred from [facility] 11/10/23 s/p unwitnessed fall with head strike at home.   CT head at [place] showed R frontal IPH, SDH, SAH.  S/p R craniectomy for ICH 11/10.   likely sec to AC  for DVT/Afib with demonstration of new bleed repeat CTH 12/02/23 stable.    -Cranioplasty planning for tomorrow 12/7 tentatively. HOLD AC starting 12/4 night and preop clearance / optimization is requested by PMT  -Neuro requesting Strict BP parameters (maintain SBP < 160, avoid rapid fluctuations)  -Case discussed with Dr. Larios.  -Patient is at an elevated risk due to comorbidities.   HTN, CAD s/p PCO, RCC s/p left nephrectomy, bladder Ca, BPH, CHF, Vertigo, HLD, AAA s/p EVAR, parox Afib, h/o HIT,  and A  - Neurology recs, recommending a WHIT to r/o PFO.   As of now daughter is undecided and will need to discuss further  -ID at this time is not recommending WHIT/blood cultures have been negative. -Patient transferred from [facility] 11/10/23 s/p unwitnessed fall with head strike at home.   CT head at [place] showed R frontal IPH, SDH, SAH.  S/p R craniectomy for ICH 11/10.   likely sec to AC  for DVT/Afib with demonstration of new bleed repeat CTH 12/02/23 stable.    -Cranioplasty planning for tomorrow 12/7 tentatively. HOLD AC starting 12/4 night and preop clearance / optimization is requested by PMT  -Neuro requesting Strict BP parameters (maintain SBP < 160, avoid rapid fluctuations)  -Case discussed with Dr. Larios.  -Patient is at an elevated risk due to comorbidities.   HTN, CAD s/p PCO, RCC s/p left nephrectomy, bladder Ca, BPH, CHF, Vertigo, HLD, AAA s/p EVAR, parox Afib, h/o HIT,  and A  - Neurology recs, recommending a WHIT to r/o PFO.   As of now daughter is undecided and will need to discuss further  -ID (as discussed with Dr Larios) at this time is not recommending WHIT/blood cultures have been negative.  -Neurosurgery not requesting WHIT at this time as discussed with DR. Larios -Patient transferred from [facility] 11/10/23 s/p unwitnessed fall with head strike at home.   CT head at [place] showed R frontal IPH, SDH, SAH.  S/p R craniectomy for ICH 11/10.   likely sec to AC  for DVT/Afib with demonstration of new bleed repeat CTH 12/02/23 stable.    -Cranioplasty planning for tomorrow 12/7 tentatively. HOLD AC starting 12/4 night and preop clearance / optimization is requested by PMT  -Neuro requesting Strict BP parameters (maintain SBP < 160, avoid rapid fluctuations)  -Case discussed with Dr. Larios.  -Patient is at an elevated risk due to comorbidities.   HTN, CAD s/p PCO, RCC s/p left nephrectomy, bladder Ca, BPH, CHF, Vertigo, HLD, AAA s/p EVAR, parox Afib, h/o HIT,  and A  - Neurology recs, recommending a WHIT to r/o PFO.   As of now daughter is undecided and will need to discuss further  -ID (as discussed with Dr Larios) at this time is not recommending WHIT/blood cultures have been negative.  -Neurosurgery not requesting WHIT at this time as discussed with DR. Larios -Patient transferred from [facility] 11/10/23 s/p unwitnessed fall with head strike at home.   CT head at [place] showed R frontal IPH, SDH, SAH.  S/p R craniectomy for ICH 11/10.   likely sec to AC  for DVT/Afib with demonstration of new bleed repeat CTH 12/02/23 stable.    -Cranioplasty planning for tomorrow 12/7 tentatively. HOLD AC starting 12/4 night and preop clearance / optimization is requested by PMT  -Neuro requesting Strict BP parameters (maintain SBP < 160, avoid rapid fluctuations)  -Case discussed with Dr. Larios.  -Patient is at an elevated risk due to comorbidities.   HTN, CAD s/p PCO, RCC s/p left nephrectomy, bladder Ca, BPH, CHF, Vertigo, HLD, AAA s/p EVAR, parox Afib, h/o HIT,  and A  - Neurology recs, recommending a WHIT to r/o PFO.   As of now daughter is undecided and will need to discuss further  -ID (as discussed with Dr Larios) at this time is not recommending WHIT/blood cultures have been negative.  -Neurosurgery not requesting WHIT at this time as discussed with DR. Larios  -Case discussed with Wife in attendance at bedside and is still refusing WHIT. -Patient transferred from [facility] 11/10/23 s/p unwitnessed fall with head strike at home.   CT head at [place] showed R frontal IPH, SDH, SAH.  S/p R craniectomy for ICH 11/10.   likely sec to AC  for DVT/Afib with demonstration of new bleed repeat CTH 12/02/23 stable.    -Cranioplasty planning for tomorrow 12/7 tentatively. HOLD AC starting 12/4 night and preop clearance / optimization is requested by PMT  -Neuro requesting Strict BP parameters (maintain SBP < 160, avoid rapid fluctuations)  -Case discussed with Dr. Larios.  -Patient is at an elevated risk due to comorbidities.   HTN, CAD s/p PCO, RCC s/p left nephrectomy, bladder Ca, BPH, CHF, Vertigo, HLD, AAA s/p EVAR, parox Afib, h/o HIT,  and A  - Neurology recs, recommending a WHIT to r/o PFO.   As of now daughter is undecided and will need to discuss further  -ID (as discussed with Dr Larios) at this time is not recommending WHIT/blood cultures have been negative.  -Neurosurgery not requesting WHIT at this time as discussed with DR. Larios  -Case discussed with Wife in attendance at bedside and is still refusing WHIT. requested for WHIT  -Patient transferred from [facility] 11/10/23 s/p unwitnessed fall with head strike at home.   CT head at [place] showed R frontal IPH, SDH, SAH.  S/p R craniectomy for ICH 11/10.   likely sec to AC  for DVT/Afib with demonstration of new bleed repeat CTH 12/02/23 stable.    -Cranioplasty planning for tomorrow 12/7 tentatively. HOLD AC starting 12/4 night and preop clearance / optimization is requested by PMT  -Neuro requesting Strict BP parameters (maintain SBP < 160, avoid rapid fluctuations)  -Case discussed with Dr. Larios.  -Patient is at an elevated risk due to comorbidities.   HTN, CAD s/p PCO, RCC s/p left nephrectomy, bladder Ca, BPH, CHF, Vertigo, HLD, AAA s/p EVAR, parox Afib, h/o HIT,  and A  - Neurology recs, recommending a WHIT to r/o PFO.   And at that time daughter was undecided.    -ID (as discussed with Dr Larios) at this time is not recommending WHIT/blood cultures have been negative.  -Neurosurgery not requesting WHIT at this time as discussed with DR. Larios  -Case discussed with Wife in attendance at bedside and is still refusing WHIT.  -Case discussed with Dr. Weber neurology - no requesting WHIT at this time. requested for WHIT  -Patient transferred from [facility] 11/10/23 s/p unwitnessed fall with head strike at home.   CT head at [place] showed R frontal IPH, SDH, SAH.  S/p R craniectomy for ICH 11/10.   likely sec to AC  for DVT/Afib with demonstration of new bleed repeat CTH 12/02/23 stable.    -Cranioplasty planning for tomorrow 12/7 tentatively. HOLD AC starting 12/4 night and preop clearance / optimization is requested by PMT  -Neuro requesting Strict BP parameters (maintain SBP < 160, avoid rapid fluctuations)  -Case discussed with Dr. Larios.  -Patient is at an elevated risk due to comorbidities.   HTN, CAD s/p PCO, RCC s/p left nephrectomy, bladder Ca, BPH, CHF, Vertigo, HLD, AAA s/p EVAR, parox Afib, h/o HIT,  and A  - Neurology recs, recommending a WHIT to r/o PFO.   And at that time daughter was undecided.    -ID (as discussed with Dr Larios) at this time is not recommending WHIT/blood cultures have been negative.  -Neurosurgery not requesting WHIT at this time as discussed with DR. Larios  -Case discussed with Wife in attendance at bedside and is still refusing WHIT.  -Case discussed with Dr. Weber neurology - no requesting WHIT at this time. requested for WHIT by PMT.    -Patient transferred from [facility] 11/10/23 s/p unwitnessed fall with head strike at home.   CT head at [place] showed R frontal IPH, SDH, SAH.  S/p R craniectomy for ICH 11/10.   likely sec to AC  for DVT/Afib with demonstration of new bleed repeat CTH 12/02/23 stable.    -Cranioplasty planning for tomorrow 12/7 tentatively. HOLD AC starting 12/4 night and preop clearance / optimization is requested by PMT  -As per notes -  Neurology recs, recommending a WHIT to r/o PFO.   And at that time daughter was undecided.    -Neuro requesting Strict BP parameters (maintain SBP < 160, avoid rapid fluctuations)  -Patient is at an elevated risk due to comorbidities.   HTN, CAD s/p PCO, RCC s/p left nephrectomy, bladder Ca, BPH, CHF, Vertigo, HLD, AAA s/p EVAR, parox Afib, h/o HIT.    -ID ( as discussed with DR. Huerta) at this time is not recommending WHIT/blood cultures have been negative.  -Neurosurgery  (as discussed with Dr. Larios) not requesting WHIT at this time.   -Case discussed with Wife in attendance at bedside and is still refusing WHIT.  -Case discussed with Dr. Weber neurology - no requesting WHIT at this time. requested for WHIT by PMT.    -Patient transferred from [facility] 11/10/23 s/p unwitnessed fall with head strike at home.   CT head at [place] showed R frontal IPH, SDH, SAH.  S/p R craniectomy for ICH 11/10.   likely sec to AC  for DVT/Afib with demonstration of new bleed repeat CTH 12/02/23 stable.    -Cranioplasty planning for tomorrow 12/7 tentatively. HOLD AC starting 12/4 night and preop clearance / optimization is requested by PMT  -As per notes -  Neurology recs, recommending a WHIT to r/o PFO.   And at that time daughter was undecided.    -Neuro requesting Strict BP parameters (maintain SBP < 160, avoid rapid fluctuations)  -Patient is at an elevated risk due to comorbidities.   HTN, CAD s/p PCO, RCC s/p left nephrectomy, bladder Ca, BPH, CHF, Vertigo, HLD, AAA s/p EVAR, parox Afib, h/o HIT.    -ID ( as discussed with DR. Huerta) at this time is not recommending WHIT/blood cultures have been negative.  -Neurosurgery  (as discussed with Dr. Larios) not requesting WHIT at this time.   -Case discussed with Wife in attendance at bedside and is still refusing WHIT.  -Case discussed with Dr. Weber neurology - no requesting WHIT at this time.

## 2023-12-06 NOTE — PROGRESS NOTE ADULT - PROBLEM SELECTOR PLAN 3
- Patient transferred from [facility] 11/10/23 s/p unwitnessed fall with head strike at home found by wife on floor at 8am. CT head at [place] showed R frontal IPH, SDH, SAH at 9:00. s/p R craniectomy for ICH 11/10.   likely sec to AC  for DVT/Afib with demonstration of new bleed repeat CTH 12/02/23 stable.    - Neurology recs, recommending a WHIT to r/o PFO.   As of now daughter is undecided and will need to discuss further. - Patient transferred from [facility] 11/10/23 s/p unwitnessed fall with head strike at home found by wife on floor at 8am. CT head at [place] showed R frontal IPH, SDH, SAH at 9:00. s/p R craniectomy for ICH 11/10.   likely sec to AC  for DVT/Afib with demonstration of new bleed repeat CTH 12/02/23 stable.    - Neurology recs, recommending a WHIT to r/o PFO.   As of now daughter is undecided and will need to discuss further.

## 2023-12-06 NOTE — PRE-ANESTHESIA EVALUATION ADULT - NSANTHADDINFOFT_GEN_ALL_CORE
Pt medical ptimization appreciated. Cardiac clearance/ optimization pending per medical note. Pt with extensive cardiac history. Pending WHIT.  labs appreciated. Please ensure labs AM of surgery.   NPO>8h  Active Type screen/Cross  2 U PRBC on hold for OR  2 large bore functional IVs  FS<200

## 2023-12-07 ENCOUNTER — TRANSCRIPTION ENCOUNTER (OUTPATIENT)
Age: 83
End: 2023-12-07

## 2023-12-07 ENCOUNTER — APPOINTMENT (OUTPATIENT)
Dept: NEUROSURGERY | Facility: HOSPITAL | Age: 83
End: 2023-12-07

## 2023-12-07 LAB
ANION GAP SERPL CALC-SCNC: 10 MMOL/L — SIGNIFICANT CHANGE UP (ref 5–17)
ANION GAP SERPL CALC-SCNC: 10 MMOL/L — SIGNIFICANT CHANGE UP (ref 5–17)
APTT BLD: 29.4 SEC — SIGNIFICANT CHANGE UP (ref 24.5–35.6)
APTT BLD: 29.4 SEC — SIGNIFICANT CHANGE UP (ref 24.5–35.6)
BUN SERPL-MCNC: 22.9 MG/DL — HIGH (ref 8–20)
BUN SERPL-MCNC: 22.9 MG/DL — HIGH (ref 8–20)
CALCIUM SERPL-MCNC: 8.9 MG/DL — SIGNIFICANT CHANGE UP (ref 8.4–10.5)
CALCIUM SERPL-MCNC: 8.9 MG/DL — SIGNIFICANT CHANGE UP (ref 8.4–10.5)
CHLORIDE SERPL-SCNC: 100 MMOL/L — SIGNIFICANT CHANGE UP (ref 96–108)
CHLORIDE SERPL-SCNC: 100 MMOL/L — SIGNIFICANT CHANGE UP (ref 96–108)
CO2 SERPL-SCNC: 24 MMOL/L — SIGNIFICANT CHANGE UP (ref 22–29)
CO2 SERPL-SCNC: 24 MMOL/L — SIGNIFICANT CHANGE UP (ref 22–29)
CREAT SERPL-MCNC: 0.94 MG/DL — SIGNIFICANT CHANGE UP (ref 0.5–1.3)
CREAT SERPL-MCNC: 0.94 MG/DL — SIGNIFICANT CHANGE UP (ref 0.5–1.3)
EGFR: 81 ML/MIN/1.73M2 — SIGNIFICANT CHANGE UP
EGFR: 81 ML/MIN/1.73M2 — SIGNIFICANT CHANGE UP
GLUCOSE SERPL-MCNC: 104 MG/DL — HIGH (ref 70–99)
GLUCOSE SERPL-MCNC: 104 MG/DL — HIGH (ref 70–99)
HCT VFR BLD CALC: 28.2 % — LOW (ref 39–50)
HCT VFR BLD CALC: 28.2 % — LOW (ref 39–50)
HGB BLD-MCNC: 9.5 G/DL — LOW (ref 13–17)
HGB BLD-MCNC: 9.5 G/DL — LOW (ref 13–17)
INR BLD: 1.1 RATIO — SIGNIFICANT CHANGE UP (ref 0.85–1.18)
INR BLD: 1.1 RATIO — SIGNIFICANT CHANGE UP (ref 0.85–1.18)
MAGNESIUM SERPL-MCNC: 1.7 MG/DL — SIGNIFICANT CHANGE UP (ref 1.6–2.6)
MAGNESIUM SERPL-MCNC: 1.7 MG/DL — SIGNIFICANT CHANGE UP (ref 1.6–2.6)
MCHC RBC-ENTMCNC: 31.6 PG — SIGNIFICANT CHANGE UP (ref 27–34)
MCHC RBC-ENTMCNC: 31.6 PG — SIGNIFICANT CHANGE UP (ref 27–34)
MCHC RBC-ENTMCNC: 33.7 GM/DL — SIGNIFICANT CHANGE UP (ref 32–36)
MCHC RBC-ENTMCNC: 33.7 GM/DL — SIGNIFICANT CHANGE UP (ref 32–36)
MCV RBC AUTO: 93.7 FL — SIGNIFICANT CHANGE UP (ref 80–100)
MCV RBC AUTO: 93.7 FL — SIGNIFICANT CHANGE UP (ref 80–100)
PHOSPHATE SERPL-MCNC: 2.9 MG/DL — SIGNIFICANT CHANGE UP (ref 2.4–4.7)
PHOSPHATE SERPL-MCNC: 2.9 MG/DL — SIGNIFICANT CHANGE UP (ref 2.4–4.7)
PLATELET # BLD AUTO: 198 K/UL — SIGNIFICANT CHANGE UP (ref 150–400)
PLATELET # BLD AUTO: 198 K/UL — SIGNIFICANT CHANGE UP (ref 150–400)
POTASSIUM SERPL-MCNC: 4.1 MMOL/L — SIGNIFICANT CHANGE UP (ref 3.5–5.3)
POTASSIUM SERPL-MCNC: 4.1 MMOL/L — SIGNIFICANT CHANGE UP (ref 3.5–5.3)
POTASSIUM SERPL-SCNC: 4.1 MMOL/L — SIGNIFICANT CHANGE UP (ref 3.5–5.3)
POTASSIUM SERPL-SCNC: 4.1 MMOL/L — SIGNIFICANT CHANGE UP (ref 3.5–5.3)
PROTHROM AB SERPL-ACNC: 12.2 SEC — SIGNIFICANT CHANGE UP (ref 9.5–13)
PROTHROM AB SERPL-ACNC: 12.2 SEC — SIGNIFICANT CHANGE UP (ref 9.5–13)
RBC # BLD: 3.01 M/UL — LOW (ref 4.2–5.8)
RBC # BLD: 3.01 M/UL — LOW (ref 4.2–5.8)
RBC # FLD: 14.3 % — SIGNIFICANT CHANGE UP (ref 10.3–14.5)
RBC # FLD: 14.3 % — SIGNIFICANT CHANGE UP (ref 10.3–14.5)
SODIUM SERPL-SCNC: 134 MMOL/L — LOW (ref 135–145)
SODIUM SERPL-SCNC: 134 MMOL/L — LOW (ref 135–145)
WBC # BLD: 8.31 K/UL — SIGNIFICANT CHANGE UP (ref 3.8–10.5)
WBC # BLD: 8.31 K/UL — SIGNIFICANT CHANGE UP (ref 3.8–10.5)
WBC # FLD AUTO: 8.31 K/UL — SIGNIFICANT CHANGE UP (ref 3.8–10.5)
WBC # FLD AUTO: 8.31 K/UL — SIGNIFICANT CHANGE UP (ref 3.8–10.5)

## 2023-12-07 PROCEDURE — 62141 CRNOP SKULL DEFECT>5 CM DIAM: CPT | Mod: AS,58

## 2023-12-07 PROCEDURE — 99232 SBSQ HOSP IP/OBS MODERATE 35: CPT

## 2023-12-07 PROCEDURE — 99291 CRITICAL CARE FIRST HOUR: CPT

## 2023-12-07 PROCEDURE — 99233 SBSQ HOSP IP/OBS HIGH 50: CPT

## 2023-12-07 PROCEDURE — 62141 CRNOP SKULL DEFECT>5 CM DIAM: CPT | Mod: 58

## 2023-12-07 DEVICE — PLATE UN3 BOX LRG: Type: IMPLANTABLE DEVICE | Site: RIGHT | Status: FUNCTIONAL

## 2023-12-07 DEVICE — SURGIFOAM PAD 8CM X 12.5CM X 10MM (100): Type: IMPLANTABLE DEVICE | Site: RIGHT | Status: FUNCTIONAL

## 2023-12-07 DEVICE — PLATE UN3 RECTANGLE: Type: IMPLANTABLE DEVICE | Site: RIGHT | Status: FUNCTIONAL

## 2023-12-07 DEVICE — SCREW UN3 AXS SELF DRILL 1.5X4MM: Type: IMPLANTABLE DEVICE | Site: RIGHT | Status: FUNCTIONAL

## 2023-12-07 DEVICE — STRYKER PEEK PRIORITY LARGE: Type: IMPLANTABLE DEVICE | Site: RIGHT | Status: FUNCTIONAL

## 2023-12-07 RX ORDER — CEFAZOLIN SODIUM 1 G
2000 VIAL (EA) INJECTION EVERY 8 HOURS
Refills: 0 | Status: DISCONTINUED | OUTPATIENT
Start: 2023-12-07 | End: 2023-12-07

## 2023-12-07 RX ORDER — DILTIAZEM HCL 120 MG
5 CAPSULE, EXT RELEASE 24 HR ORAL ONCE
Refills: 0 | Status: COMPLETED | OUTPATIENT
Start: 2023-12-07 | End: 2023-12-07

## 2023-12-07 RX ORDER — CEFAZOLIN SODIUM 1 G
2000 VIAL (EA) INJECTION EVERY 8 HOURS
Refills: 0 | Status: DISCONTINUED | OUTPATIENT
Start: 2023-12-07 | End: 2023-12-10

## 2023-12-07 RX ORDER — CHLORHEXIDINE GLUCONATE 213 G/1000ML
1 SOLUTION TOPICAL DAILY
Refills: 0 | Status: DISCONTINUED | OUTPATIENT
Start: 2023-12-07 | End: 2023-12-08

## 2023-12-07 RX ORDER — LEVETIRACETAM 250 MG/1
500 TABLET, FILM COATED ORAL EVERY 12 HOURS
Refills: 0 | Status: DISCONTINUED | OUTPATIENT
Start: 2023-12-07 | End: 2023-12-22

## 2023-12-07 RX ORDER — OXYCODONE HYDROCHLORIDE 5 MG/1
10 TABLET ORAL EVERY 4 HOURS
Refills: 0 | Status: DISCONTINUED | OUTPATIENT
Start: 2023-12-07 | End: 2023-12-07

## 2023-12-07 RX ORDER — FOLIC ACID 0.8 MG
1 TABLET ORAL DAILY
Refills: 0 | Status: DISCONTINUED | OUTPATIENT
Start: 2023-12-07 | End: 2024-01-12

## 2023-12-07 RX ORDER — LABETALOL HCL 100 MG
10 TABLET ORAL
Refills: 0 | Status: DISCONTINUED | OUTPATIENT
Start: 2023-12-07 | End: 2023-12-21

## 2023-12-07 RX ORDER — LEVETIRACETAM 250 MG/1
500 TABLET, FILM COATED ORAL ONCE
Refills: 0 | Status: COMPLETED | OUTPATIENT
Start: 2023-12-07 | End: 2023-12-07

## 2023-12-07 RX ORDER — ATORVASTATIN CALCIUM 80 MG/1
80 TABLET, FILM COATED ORAL AT BEDTIME
Refills: 0 | Status: DISCONTINUED | OUTPATIENT
Start: 2023-12-07 | End: 2024-01-12

## 2023-12-07 RX ORDER — SENNA PLUS 8.6 MG/1
2 TABLET ORAL AT BEDTIME
Refills: 0 | Status: DISCONTINUED | OUTPATIENT
Start: 2023-12-07 | End: 2023-12-25

## 2023-12-07 RX ORDER — DILTIAZEM HCL 120 MG
60 CAPSULE, EXT RELEASE 24 HR ORAL
Refills: 0 | Status: DISCONTINUED | OUTPATIENT
Start: 2023-12-07 | End: 2023-12-07

## 2023-12-07 RX ORDER — SODIUM CHLORIDE 9 MG/ML
1000 INJECTION INTRAMUSCULAR; INTRAVENOUS; SUBCUTANEOUS
Refills: 0 | Status: DISCONTINUED | OUTPATIENT
Start: 2023-12-07 | End: 2023-12-07

## 2023-12-07 RX ORDER — SODIUM CHLORIDE 9 MG/ML
1000 INJECTION INTRAMUSCULAR; INTRAVENOUS; SUBCUTANEOUS
Refills: 0 | Status: DISCONTINUED | OUTPATIENT
Start: 2023-12-07 | End: 2023-12-08

## 2023-12-07 RX ORDER — MODAFINIL 200 MG/1
100 TABLET ORAL
Refills: 0 | Status: DISCONTINUED | OUTPATIENT
Start: 2023-12-07 | End: 2023-12-14

## 2023-12-07 RX ORDER — METOPROLOL TARTRATE 50 MG
50 TABLET ORAL
Refills: 0 | Status: DISCONTINUED | OUTPATIENT
Start: 2023-12-07 | End: 2023-12-21

## 2023-12-07 RX ORDER — MODAFINIL 200 MG/1
100 TABLET ORAL
Refills: 0 | Status: DISCONTINUED | OUTPATIENT
Start: 2023-12-07 | End: 2023-12-07

## 2023-12-07 RX ORDER — ONDANSETRON 8 MG/1
4 TABLET, FILM COATED ORAL EVERY 6 HOURS
Refills: 0 | Status: DISCONTINUED | OUTPATIENT
Start: 2023-12-07 | End: 2023-12-21

## 2023-12-07 RX ORDER — HYDRALAZINE HCL 50 MG
10 TABLET ORAL
Refills: 0 | Status: DISCONTINUED | OUTPATIENT
Start: 2023-12-07 | End: 2023-12-21

## 2023-12-07 RX ORDER — DILTIAZEM HCL 120 MG
60 CAPSULE, EXT RELEASE 24 HR ORAL
Refills: 0 | Status: DISCONTINUED | OUTPATIENT
Start: 2023-12-07 | End: 2023-12-21

## 2023-12-07 RX ORDER — ACETAMINOPHEN 500 MG
650 TABLET ORAL EVERY 6 HOURS
Refills: 0 | Status: DISCONTINUED | OUTPATIENT
Start: 2023-12-07 | End: 2024-01-12

## 2023-12-07 RX ORDER — POLYETHYLENE GLYCOL 3350 17 G/17G
17 POWDER, FOR SOLUTION ORAL DAILY
Refills: 0 | Status: DISCONTINUED | OUTPATIENT
Start: 2023-12-07 | End: 2023-12-25

## 2023-12-07 RX ORDER — SODIUM CHLORIDE 9 MG/ML
500 INJECTION INTRAMUSCULAR; INTRAVENOUS; SUBCUTANEOUS ONCE
Refills: 0 | Status: COMPLETED | OUTPATIENT
Start: 2023-12-07 | End: 2023-12-08

## 2023-12-07 RX ORDER — LANOLIN ALCOHOL/MO/W.PET/CERES
5 CREAM (GRAM) TOPICAL AT BEDTIME
Refills: 0 | Status: DISCONTINUED | OUTPATIENT
Start: 2023-12-07 | End: 2023-12-21

## 2023-12-07 RX ORDER — METOPROLOL TARTRATE 50 MG
5 TABLET ORAL ONCE
Refills: 0 | Status: COMPLETED | OUTPATIENT
Start: 2023-12-07 | End: 2023-12-07

## 2023-12-07 RX ORDER — OXYCODONE HYDROCHLORIDE 5 MG/1
5 TABLET ORAL EVERY 4 HOURS
Refills: 0 | Status: DISCONTINUED | OUTPATIENT
Start: 2023-12-07 | End: 2023-12-11

## 2023-12-07 RX ADMIN — Medication 50 MILLIGRAM(S): at 00:28

## 2023-12-07 RX ADMIN — SODIUM CHLORIDE 50 MILLILITER(S): 9 INJECTION INTRAMUSCULAR; INTRAVENOUS; SUBCUTANEOUS at 09:26

## 2023-12-07 RX ADMIN — SODIUM CHLORIDE 50 MILLILITER(S): 9 INJECTION INTRAMUSCULAR; INTRAVENOUS; SUBCUTANEOUS at 22:25

## 2023-12-07 RX ADMIN — LEVETIRACETAM 400 MILLIGRAM(S): 250 TABLET, FILM COATED ORAL at 22:25

## 2023-12-07 RX ADMIN — Medication 60 MILLIGRAM(S): at 05:24

## 2023-12-07 RX ADMIN — MODAFINIL 100 MILLIGRAM(S): 200 TABLET ORAL at 08:30

## 2023-12-07 RX ADMIN — CHLORHEXIDINE GLUCONATE 1 APPLICATION(S): 213 SOLUTION TOPICAL at 12:00

## 2023-12-07 RX ADMIN — CHLORHEXIDINE GLUCONATE 1 APPLICATION(S): 213 SOLUTION TOPICAL at 05:24

## 2023-12-07 RX ADMIN — Medication 2000 MILLIGRAM(S): at 22:26

## 2023-12-07 RX ADMIN — Medication 5 MILLIGRAM(S): at 22:26

## 2023-12-07 NOTE — CONSULT NOTE ADULT - SUBJECTIVE AND OBJECTIVE BOX
NSICU ATTENDING CONSULT/ADMISSION NOTE    Historical Review:   82M with h/o HTN, HLD, renal cell carcinoma (s/p left nephrectomy), CAD (s/p stents c/b HFpEF and LBBB, on DAPT),  AAA (without rupture s/p EVAR), paroxysmal A-fib on Eliquis, cognitive impairment, 11/10 had unwitnessed fall with head strike, found on the floor, taken initial to urgent care for staples to posterior scalp, then sent to [facility] ED.    CT head - R frontal IPH, SDH, SAH     Transferred to [facility] on 6L NC.  Pt GCS 15 on arrival, A&Ox2 on arrival. After initial assessment, patient noted vomited enroute to CT scanner.     Hospital Course:  Underwent R hemicrani, admitted to NSICU post-op. Extubated on 11/13, developed aspiration pneumonitis/tracheobronchitis treated with cefipime -> then narrowed to ceftriaxone -> rebroadened to meropenem for ongoing fevers. Required HFNC for increased WOB and hypoxia. Fluctuating mental status, EEG negative, mental status slowly improved. Found to have LUE DVT in brachial vein. Downgraded to stepdown 11/23 under NSGY, advance to pureed diet. Reupgraded 11/26-28 for AMS, CTH with new blood at falx -> rpt stable, leakage from crani site that resolved. Subsequently downgraded to medicine, no complications, stable for cranioplasty 12/7. Eliquis held for procedure.    24hr EVENTS:  12/7 s/p cranioplasty with complex plastics surgical closure, drain in place. EBL 100mL, I 800mL/O 300mL. Extubated post-procedurally, transferred to NSICU with oral airway in place, now removed. Still awakening from anesthesia but following commands    ROS: [x]  Unable to fully assess as patient is recently post-op    ----------------------------------------------------------------------------------------------------  PHYSICAL EXAM:  General: comfortable, lying in bed  HEENT: minimal oral secretions, temporal wasting  Neuro:  -Mental status- opens eyes to voice, not speaking, intermittently following commands  -CN- PERRL 3mm, EOMI, tongue midline, face asymmetric  -Motor- lifts RUE off bed, raises middle finger, LUE w/d  LEs maintained in flexed position antigravity  -Sensation intact on right to LT    CV: regular rate and rhythm  Pulm: no accessory muscle use, O2 sat 99% on RA  Abd: soft, nontender, nondistended, LBM 12/7  Skin: warm, dry    Surgical site: c/d/i, drain with serosanguinous output    -----------------------------------------------------------------------------------------------------  ICU Vital Signs Last 24 Hrs  T(C): 36.3 (07 Dec 2023 19:55), Max: 37 (07 Dec 2023 04:00)  T(F): 97.3 (07 Dec 2023 19:55), Max: 98.6 (07 Dec 2023 04:00)  HR: 84 (07 Dec 2023 20:45) (73 - 97)  BP: 101/65 (07 Dec 2023 20:45) (101/65 - 151/100)  BP(mean): 77 (07 Dec 2023 20:45) (71 - 112)  RR: 13 (07 Dec 2023 20:45) (11 - 20)  SpO2: 95% (07 Dec 2023 20:45) (95% - 99%)    O2 Parameters below as of 07 Dec 2023 19:55  Patient On (Oxygen Delivery Method): room air      I&O's Summary    06 Dec 2023 07:01  -  07 Dec 2023 07:00  --------------------------------------------------------  IN: 0 mL / OUT: 1800 mL / NET: -1800 mL    07 Dec 2023 07:01  -  07 Dec 2023 21:20  --------------------------------------------------------  IN: 150 mL / OUT: 0 mL / NET: 150 mL        MEDICATIONS  (STANDING):  atorvastatin 80 milliGRAM(s) Oral at bedtime  ceFAZolin  Injectable. 2000 milliGRAM(s) IV Push every 8 hours  chlorhexidine 2% Cloths 1 Application(s) Topical daily  diltiazem    Tablet 60 milliGRAM(s) Oral <User Schedule>  diltiazem Injectable 5 milliGRAM(s) IV Push once  folic acid 1 milliGRAM(s) Oral daily  levETIRAcetam  IVPB 500 milliGRAM(s) IV Intermittent every 12 hours  melatonin 5 milliGRAM(s) Oral at bedtime  metoprolol tartrate 50 milliGRAM(s) Oral <User Schedule>  modafinil 100 milliGRAM(s) Oral <User Schedule>  Nephro-roma 1 Tablet(s) Oral daily  polyethylene glycol 3350 17 Gram(s) Oral daily  senna 2 Tablet(s) Oral at bedtime  sodium chloride 0.9%. 1000 milliLiter(s) (50 mL/Hr) IV Continuous <Continuous>    IMAGING:   Recent imaging studies were reviewed.    LAB RESULTS:               9.5    8.31  )-----------( 198      ( 07 Dec 2023 05:35 )             28.2       PT/INR - ( 07 Dec 2023 05:35 )   PT: 12.2 sec;   INR: 1.10 ratio       PTT - ( 07 Dec 2023 05:35 )  PTT:29.4 sec    12-07    134<L>  |  100  |  22.9<H>  ----------------------------<  104<H>  4.1   |  24.0  |  0.94    Ca    8.9      07 Dec 2023 05:35  Phos  2.9     12-07  Mg     1.7     12-07    -----------------------------------------------------------------------------------------------------------------------------------------------------------------------------------                     NSICU ATTENDING CONSULT/ADMISSION NOTE    Historical Review:   82M with h/o HTN, HLD, renal cell carcinoma (s/p left nephrectomy), CAD (s/p stents c/b HFpEF and LBBB, on DAPT),  AAA (without rupture s/p EVAR), paroxysmal A-fib on Eliquis, cognitive impairment, 11/10 had unwitnessed fall with head strike, found on the floor, taken initial to urgent care for staples to posterior scalp, then sent to [facility] ED.    CT head - R frontal IPH, SDH, SAH     Transferred to [facility] on 6L NC.  Pt GCS 15 on arrival, A&Ox2 on arrival. After initial assessment, patient noted vomited enroute to CT scanner.     Hospital Course:  Underwent R hemicrani, admitted to NSICU post-op. Extubated on 11/13, developed aspiration pneumonitis/tracheobronchitis treated with cefipime -> then narrowed to ceftriaxone -> rebroadened to meropenem for ongoing fevers. Required HFNC for increased WOB and hypoxia. Fluctuating mental status, EEG negative, mental status slowly improved. Found to have LUE DVT in brachial vein. Downgraded to stepdown 11/23 under NSGY, advance to pureed diet. Reupgraded 11/26-28 for AMS, CTH with new blood at falx -> rpt stable, leakage from crani site that resolved. Subsequently downgraded to medicine, no complications, stable for cranioplasty 12/7. Eliquis held for procedure.    24hr EVENTS:  12/7 s/p cranioplasty with complex plastics surgical closure, drain in place. EBL 100mL, I 800mL/O 300mL. Extubated post-procedurally, transferred to NSICU with oral airway in place, now removed. Still awakening from anesthesia but following commands    ROS: [x]  Unable to fully assess as patient is recently post-op    ----------------------------------------------------------------------------------------------------  PHYSICAL EXAM:  General: comfortable, lying in bed  HEENT: minimal oral secretions, temporal wasting  Neuro:  -Mental status- opens eyes to voice, not speaking, intermittently following commands  -CN- PERRL 3mm, EOMI, tongue midline, face asymmetric  -Motor- lifts RUE off bed, raises middle finger, LUE w/d  LEs maintained in flexed position antigravity  -Sensation intact on right to LT    CV: regular rate and rhythm  Pulm: no accessory muscle use, O2 sat 99% on RA  Abd: soft, nontender, nondistended, LBM 12/7  Skin: warm, dry    Surgical site: c/d/i, drain with serosanguinous output    -----------------------------------------------------------------------------------------------------  ICU Vital Signs Last 24 Hrs  T(C): 36.3 (07 Dec 2023 19:55), Max: 37 (07 Dec 2023 04:00)  T(F): 97.3 (07 Dec 2023 19:55), Max: 98.6 (07 Dec 2023 04:00)  HR: 84 (07 Dec 2023 20:45) (73 - 97)  BP: 101/65 (07 Dec 2023 20:45) (101/65 - 151/100)  BP(mean): 77 (07 Dec 2023 20:45) (71 - 112)  RR: 13 (07 Dec 2023 20:45) (11 - 20)  SpO2: 95% (07 Dec 2023 20:45) (95% - 99%)    O2 Parameters below as of 07 Dec 2023 19:55  Patient On (Oxygen Delivery Method): room air      I&O's Summary    06 Dec 2023 07:01  -  07 Dec 2023 07:00  --------------------------------------------------------  IN: 0 mL / OUT: 1800 mL / NET: -1800 mL    07 Dec 2023 07:01  -  07 Dec 2023 21:20  --------------------------------------------------------  IN: 150 mL / OUT: 0 mL / NET: 150 mL        MEDICATIONS  (STANDING):  atorvastatin 80 milliGRAM(s) Oral at bedtime  ceFAZolin  Injectable. 2000 milliGRAM(s) IV Push every 8 hours  chlorhexidine 2% Cloths 1 Application(s) Topical daily  diltiazem    Tablet 60 milliGRAM(s) Oral <User Schedule>  diltiazem Injectable 5 milliGRAM(s) IV Push once  folic acid 1 milliGRAM(s) Oral daily  levETIRAcetam  IVPB 500 milliGRAM(s) IV Intermittent every 12 hours  melatonin 5 milliGRAM(s) Oral at bedtime  metoprolol tartrate 50 milliGRAM(s) Oral <User Schedule>  modafinil 100 milliGRAM(s) Oral <User Schedule>  Nephro-roma 1 Tablet(s) Oral daily  polyethylene glycol 3350 17 Gram(s) Oral daily  senna 2 Tablet(s) Oral at bedtime  sodium chloride 0.9%. 1000 milliLiter(s) (50 mL/Hr) IV Continuous <Continuous>    IMAGING:   Recent imaging studies were reviewed.    LAB RESULTS:               9.5    8.31  )-----------( 198      ( 07 Dec 2023 05:35 )             28.2       PT/INR - ( 07 Dec 2023 05:35 )   PT: 12.2 sec;   INR: 1.10 ratio       PTT - ( 07 Dec 2023 05:35 )  PTT:29.4 sec    12-07    134<L>  |  100  |  22.9<H>  ----------------------------<  104<H>  4.1   |  24.0  |  0.94    Ca    8.9      07 Dec 2023 05:35  Phos  2.9     12-07  Mg     1.7     12-07    -----------------------------------------------------------------------------------------------------------------------------------------------------------------------------------

## 2023-12-07 NOTE — CONSULT NOTE ADULT - CRITICAL CARE ATTENDING COMMENT
I have personally provided the above mentioned minutes of critical care time including review of laboratory values, imaging, interdisciplinary care coordination, and frequent monitoring for decompensation.    Based on my personal evaluation, this patient has a high probability of imminent or life-threatening deterioration due to the presence of: TBI s/p hemicrani now s/p cranioplasty, afib, encephalopathy, h/o nephrectomy -  which required my direct attention, intervention, and personal management. Other billable procedures, if performed, are documented separately.

## 2023-12-07 NOTE — PROGRESS NOTE ADULT - SUBJECTIVE AND OBJECTIVE BOX
INTERVAL HPI/OVERNIGHT EVENTS:  83y Male PMH HTN, CAD s/p stents on ASA/Plavix, RCC s/p L nephrectomy, bladder CA, BPH, CHF, LBBB, vertigo, HLD, 5.5 cm AAA without rupture post EVAR, paroxysmal afib on Eliquis, early stage Alzheimer's dementia, presented to [facility] 11/10/23 s/p unwitnessed fall with head strike at home found by wife, found with multicompartmental ICH, s/p R craniectomy 11/10/23, course significant for Klebsiella pneumonia, hypernatremia, lethargy, LUE DVT, new anterior falcine SDH, downgraded to SDU 11/28. Patient seen this AM, drowsy but easily arousable. No acute events overnight    Vital Signs Last 24 Hrs  T(C): 36.7 (07 Dec 2023 08:00), Max: 37.1 (06 Dec 2023 20:00)  T(F): 98 (07 Dec 2023 08:00), Max: 98.8 (06 Dec 2023 20:00)  HR: 74 (07 Dec 2023 08:01) (73 - 92)  BP: 108/60 (07 Dec 2023 08:01) (108/60 - 149/69)  BP(mean): 71 (07 Dec 2023 08:01) (66 - 97)  RR: 14 (07 Dec 2023 08:01) (14 - 20)  SpO2: 98% (07 Dec 2023 08:01) (98% - 99%)    Parameters below as of 07 Dec 2023 08:01  Patient On (Oxygen Delivery Method): room air    PHYSICAL EXAM:  GENERAL: NAD, well-groomed  HEAD: S/p right craniectomy. Flap full, soft. Incision healing well  JOEY COMA SCORE: E- 3 V- 4 M- 6=13  MENTAL STATUS: Awakes to voice, oriented to self only. Minimally conversant. Following simple commands on right  CRANIAL NERVES: PERRL. Gaze midline. Would not follow today to assess EOMs.   MOTOR: RUE/RLE at least 4/5, LUE/LLE WD to noxious  SENSATION: grossly intact on right, as above to noxious on left    LABS:                        9.5    8.31  )-----------( 198      ( 07 Dec 2023 05:35 )             28.2     12-07    134<L>  |  100  |  22.9<H>  ----------------------------<  104<H>  4.1   |  24.0  |  0.94    Ca    8.9      07 Dec 2023 05:35  Phos  2.9     12-07  Mg     1.7     12-07    PT/INR - ( 07 Dec 2023 05:35 )   PT: 12.2 sec;   INR: 1.10 ratio       PTT - ( 07 Dec 2023 05:35 )  PTT:29.4 sec  Urinalysis Basic - ( 07 Dec 2023 05:35 )    Color: x / Appearance: x / SG: x / pH: x  Gluc: 104 mg/dL / Ketone: x  / Bili: x / Urobili: x   Blood: x / Protein: x / Nitrite: x   Leuk Esterase: x / RBC: x / WBC x   Sq Epi: x / Non Sq Epi: x / Bacteria: x    12-06 @ 07:01  -  12-07 @ 07:00  --------------------------------------------------------  IN: 0 mL / OUT: 1800 mL / NET: -1800 mL    RADIOLOGY & ADDITIONAL TESTS:  CT Head No Cont (12.05.23 @ 11:17)  IMPRESSION:   Unchanged evolving hemorrhage within the RIGHT frontal   lobe, medially and laterally with associated edema. RIGHT frontoparietal   hemicraniectomy is again noted with unchanged extra-axial. Mild to   moderate periventricular white matter ischemia noted.    CT Head No Cont (12.02.23 @ 05:10)  IMPRESSION:    Right hemicraniectomy. Right subdural fluid attenuation   collection as seen on the prior. Decreased conspicuity to regions of   acute hemorrhage in the right high frontal parasagittal region as well as   stable appearance to hemorrhage in the right insular posterior frontal   region in association with lucency as seen on the prior study    CT Head No Cont (11.29.23 @ 08:58):  IMPRESSION:   Stable intracranial hemorrhages.    CT Head No Cont (11.28.23 @ 20:36):  IMPRESSION:   Stable intracranial hemorrhages.    CT Head No Cont (11.27.23 @ 10:32):  IMPRESSION:  Grossly stable CT examination of the head when compared prior imaging.    CT Head No Cont (11.26.23 @ 21:59):  IMPRESSION:   Overall stable follow-up CT exam when compared with the most   recent prior CT study.   INTERVAL HPI/OVERNIGHT EVENTS:  83y Male PMH HTN, CAD s/p stents on ASA/Plavix, RCC s/p L nephrectomy, bladder CA, BPH, CHF, LBBB, vertigo, HLD, 5.5 cm AAA without rupture post EVAR, paroxysmal afib on Eliquis, early stage Alzheimer's dementia, presented to [facility] 11/10/23 s/p unwitnessed fall with head strike at home found by wife, found with multicompartmental ICH, s/p R craniectomy 11/10/23, course significant for Klebsiella pneumonia, hypernatremia, lethargy, LUE DVT, new anterior falcine SDH, downgraded to SDU 11/28. Patient seen this AM, drowsy but easily arousable. No acute events overnight    Vital Signs Last 24 Hrs  T(C): 36.7 (07 Dec 2023 08:00), Max: 37.1 (06 Dec 2023 20:00)  T(F): 98 (07 Dec 2023 08:00), Max: 98.8 (06 Dec 2023 20:00)  HR: 74 (07 Dec 2023 08:01) (73 - 92)  BP: 108/60 (07 Dec 2023 08:01) (108/60 - 149/69)  BP(mean): 71 (07 Dec 2023 08:01) (66 - 97)  RR: 14 (07 Dec 2023 08:01) (14 - 20)  SpO2: 98% (07 Dec 2023 08:01) (98% - 99%)    Parameters below as of 07 Dec 2023 08:01  Patient On (Oxygen Delivery Method): room air    PHYSICAL EXAM:  GENERAL: NAD, well-groomed  HEAD: S/p right craniectomy. Flap full, soft. Incision healing well  JOEY COMA SCORE: E- 3 V- 4 M- 6=13  MENTAL STATUS: Awakes to voice, oriented to self only. Minimally conversant. Following simple commands on right  CRANIAL NERVES: PERRL. Gaze midline. Would not follow today to assess EOMs.   MOTOR: RUE/RLE at least 4/5, LUE/LLE WD to noxious  SENSATION: grossly intact on right, as above to noxious on left    LABS:                        9.5    8.31  )-----------( 198      ( 07 Dec 2023 05:35 )             28.2     12-07    134<L>  |  100  |  22.9<H>  ----------------------------<  104<H>  4.1   |  24.0  |  0.94    Ca    8.9      07 Dec 2023 05:35  Phos  2.9     12-07  Mg     1.7     12-07    PT/INR - ( 07 Dec 2023 05:35 )   PT: 12.2 sec;   INR: 1.10 ratio       PTT - ( 07 Dec 2023 05:35 )  PTT:29.4 sec  Urinalysis Basic - ( 07 Dec 2023 05:35 )    Color: x / Appearance: x / SG: x / pH: x  Gluc: 104 mg/dL / Ketone: x  / Bili: x / Urobili: x   Blood: x / Protein: x / Nitrite: x   Leuk Esterase: x / RBC: x / WBC x   Sq Epi: x / Non Sq Epi: x / Bacteria: x    12-06 @ 07:01  -  12-07 @ 07:00  --------------------------------------------------------  IN: 0 mL / OUT: 1800 mL / NET: -1800 mL    RADIOLOGY & ADDITIONAL TESTS:  CT Head No Cont (12.05.23 @ 11:17)  IMPRESSION:   Unchanged evolving hemorrhage within the RIGHT frontal   lobe, medially and laterally with associated edema. RIGHT frontoparietal   hemicraniectomy is again noted with unchanged extra-axial. Mild to   moderate periventricular white matter ischemia noted.    CT Head No Cont (12.02.23 @ 05:10)  IMPRESSION:    Right hemicraniectomy. Right subdural fluid attenuation   collection as seen on the prior. Decreased conspicuity to regions of   acute hemorrhage in the right high frontal parasagittal region as well as   stable appearance to hemorrhage in the right insular posterior frontal   region in association with lucency as seen on the prior study    CT Head No Cont (11.29.23 @ 08:58):  IMPRESSION:   Stable intracranial hemorrhages.    CT Head No Cont (11.28.23 @ 20:36):  IMPRESSION:   Stable intracranial hemorrhages.    CT Head No Cont (11.27.23 @ 10:32):  IMPRESSION:  Grossly stable CT examination of the head when compared prior imaging.    CT Head No Cont (11.26.23 @ 21:59):  IMPRESSION:   Overall stable follow-up CT exam when compared with the most   recent prior CT study.

## 2023-12-07 NOTE — PROGRESS NOTE ADULT - ASSESSMENT
83M with HTN, CAD s/p PCO, RCC s/p left nephrectomy, bladder Ca, BPH, CHF, Vertigo, HLD, AAA s/p EVAR, parox Afib, h/o HIT, Alzheimer transferred s/p fall with Rt frontal IPH. Initially had hemicraniectomy course complicated by RVR, asp PNA, hypoxic resp failure, LUE DVT and questionable increase in AAA size. Started on AC with waxing/waning mental status prompting head imaging with evidence of new bleed. Now downgrade to Medicine.     IPH -s/p Rt hemicraniotomy   -likely sec to AC  for DVT/Afib with demonstration of new bleed  -repeat CTH 12/02/23,stable  -Q2 neuro checks  -SBP goal normotensive 100-160  -Continue amantadine, memantine   -Cranioplasty planning for tomorrow 12/7 tentatively. HOLD AC starting 12/4 night.  -Strict BP parameters (maintain SBP < 160, avoid rapid fluctuations)  -Continue modified diet . MB STUDY per speech order  -Helmet to be worn when OOB when OOB      P. Afib/ HTN/HLD   -Continue Metoprolol/diltiazem BID  -Continue Statin   -Labetalol / Hydralazine PRN   -Hold eliquis  per NSx. last dose 12/04 am.plan for or tomorrow        Questionable veg on TTE - discussed with iD- recommends WHIT  -Per neurology recs, recommending a WHIT to r/o PFO. Cardiology spoke with patient's daughter, Jennifer ([phone]) regarding WHIT and indications for WHIT. Per daughter, she was questioning whether a WHIT was performed or not. Per review of outpatient chart, no WHIT was performed.Daughter wants to think about it.she will get back us if she wants to do TTE.  -Spoke with  Cardiology team,will f/u further rec.    Acute DVT   -hold  AC as above, avoid heparin products 2/2 h/o HIT  -b/l scd;s for now per neurosurgery    Normocytic Anemia   Stools visualized. Normal color, no melena / evidence of GI blood loss  Repeat in am along with T&S,  transfuse to goal > 8 given h/o CAD    AAA s/p EVAR   Imaging reviewed by Vascular surgery  No intervention planned at present  Recommended obtaining prior imaging from pts Vascular surgeon (Dr Heredia for comparison)    DONNIE- resolved   Suspect may have CKD II  Monitor and renally dose all medications   hall maintained, TOV when more ambulatory/ mental status improved     Acute hypoxic resp failure 2/2 suspected asp PNA   Resolved   s/p completion of IV Abx   ID recs reviewed  dc namenda/cardura---family does not want--per family made pt confused in past.  spoke with wife at bedside and daughter Jennifer over phone.Update plan of care.  bebeto cardio about procedure tomorrow.will f//u any rec pre/post op.  bebeto rn 83M with HTN, CAD s/p PCO, RCC s/p left nephrectomy, bladder Ca, BPH, CHF, Vertigo, HLD, AAA s/p EVAR, parox Afib, h/o HIT, Alzheimer transferred s/p fall with Rt frontal IPH. Initially had hemicraniectomy course complicated by RVR, asp PNA, hypoxic resp failure, LUE DVT and questionable increase in AAA size. Started on AC with waxing/waning mental status prompting head imaging with evidence of new bleed. Now downgrade to Medicine.     IPH -s/p Rt hemicraniotomy   -likely sec to AC  for DVT/Afib with demonstration of new bleed  -repeat CTH 12/02/23,stable  -Q2 neuro checks  -SBP goal normotensive 100-160  -Continue amantadine, memantine   -Cranioplasty planning for tomorrow 12/7 tentatively. HOLD AC starting 12/4 night.  -Strict BP parameters (maintain SBP < 160, avoid rapid fluctuations)  -Continue modified diet . MB STUDY per speech order  -Helmet to be worn when OOB when OOB      P. Afib/ HTN/HLD   -Continue Metoprolol/diltiazem BID  -Continue Statin   -Labetalol / Hydralazine PRN   -Hold eliquis  per NSx. last dose 12/04 am.plan for or tomorrow        Questionable veg on TTE - discussed with iD- recommends WHIT  -Per neurology recs, recommending a WHIT to r/o PFO. Cardiology spoke with patient's daughter, Jennifer ([phone]) regarding WHIT and indications for WHIT. Per daughter, she was questioning whether a WHIT was performed or not. Per review of outpatient chart, no WHIT was performed.Daughter wants to think about it.she will get back us if she wants to do TTE.  -Spoke with  Cardiology team,will f/u further rec.    Acute DVT   -hold  AC as above, avoid heparin products 2/2 h/o HIT  -b/l scd;s for now per neurosurgery    Normocytic Anemia   Stools visualized. Normal color, no melena / evidence of GI blood loss  Repeat in am along with T&S,  transfuse to goal > 8 given h/o CAD    AAA s/p EVAR   Imaging reviewed by Vascular surgery  No intervention planned at present  Recommended obtaining prior imaging from pts Vascular surgeon (Dr Heredia for comparison)    DONNIE- resolved   Suspect may have CKD II  Monitor and renally dose all medications   hall maintained, TOV when more ambulatory/ mental status improved     Acute hypoxic resp failure 2/2 suspected asp PNA   Resolved   s/p completion of IV Abx   ID recs reviewed  dc namenda/cardura---family does not want--per family made pt confused in past.  spoke with wife at bedside and daughter Jennifer over phone.Update plan of care.  bebeto cardio about procedure tomorrow.will f//u any rec pre/post op.  bebeto rn 83M with HTN, CAD s/p PCO, RCC s/p left nephrectomy, bladder Ca, BPH, CHF, Vertigo, HLD, AAA s/p EVAR, parox Afib, h/o HIT, Alzheimer transferred s/p fall with Rt frontal IPH. Initially had hemicraniectomy course complicated by RVR, asp PNA, hypoxic resp failure, LUE DVT and questionable increase in AAA size. Started on AC with waxing/waning mental status prompting head imaging with evidence of new bleed. Now downgrade to Medicine.     IPH -s/p Rt hemicraniotomy   -likely sec to AC for DVT/Afib with demonstration of new bleed  -repeat CTH 12/05/23,stable  -Q2 neuro checks  -SBP goal normotensive 100-160  -Continue amantadine, memantine   -Cranioplasty planning for tomorrow 12/7 tentatively. HOLD AC starting 12/4 night.  -Strict BP parameters (maintain SBP < 160, avoid rapid fluctuations)  -Continue modified diet . MB STUDY per speech order  -Helmet to be worn when OOB when OOB      P. Afib/ HTN/HLD   -Continue Metoprolol/diltiazem BID  -Continue Statin   -Labetalol / Hydralazine PRN   -Hold eliquis  per NSx. last dose 12/04 am.plan for today          Acute DVT   -hold  AC as above, avoid heparin products 2/2 h/o HIT  -b/l scd;s for now per neurosurgery    Normocytic Anemia   Stools visualized. Normal color, no melena / evidence of GI blood loss  Repeat in am along with T&S,  transfuse to goal > 8 given h/o CAD    AAA s/p EVAR   Imaging reviewed by Vascular surgery  No intervention planned at present  Recommended obtaining prior imaging from pts Vascular surgeon (Dr Heredia for comparison)    DONNIE- resolved   Suspect may have CKD II  Monitor and renally dose all medications   hall maintained, TOV when more ambulatory/ mental status improved     Acute hypoxic resp failure 2/2 suspected asp PNA   Resolved   s/p completion of IV Abx   ID recs reviewed  dc namenda/cardura---family does not want--per family made pt confused in past.  spoke with wife  and daughter Jennifer over phone.Update plan of care.  bebeto cardio no absolute contraindication of procedure. No need of WHIT  dw rn

## 2023-12-07 NOTE — BRIEF OPERATIVE NOTE - NSICDXBRIEFPROCEDURE_GEN_ALL_CORE_FT
PROCEDURES:  Decompressive craniectomy 10-Nov-2023 16:45:30  Suzan Vanessa  
PROCEDURES:  Right cranioplasty with replacement bone flap 07-Dec-2023 19:45:21  Kristin Villasenor

## 2023-12-07 NOTE — PROGRESS NOTE ADULT - ASSESSMENT
83y Male PMH HTN, CAD s/p stents on ASA/Plavix, RCC s/p L nephrectomy, bladder CA, BPH, CHF, LBBB, vertigo, HLD, 5.5 cm AAA without rupture post EVAR, paroxysmal afib on Eliquis, early stage Alzheimer's dementia, presented to [facility] 11/10/23 s/p unwitnessed fall with head strike at home found by wife, found with multicompartmental ICH, s/p R craniectomy 11/10/23, course significant for Klebsiella pneumonia, hypernatremia, lethargy, LUE DVT, new anterior falcine SDH, downgraded to SDU 11/28    PLAN:  - will d/w attending  - Q2 neuro checks with protected sleep  - Cranioplasty later today with plastic surgery, NSICU postop  - Helmet when OOB until cranioplasty  - Amantadine 150mg QAM  - Memantine 5mg BID   - Normotensive BP goals, 100-160  - NS@50 while NPO  - Hold ACT/APT  - SCDs for DVT ppx. 2U PRBC on hold for OR  - Supportive care/further medical management per primary team  83y Male PMH HTN, CAD s/p stents on ASA/Plavix, RCC s/p L nephrectomy, bladder CA, BPH, CHF, LBBB, vertigo, HLD, 5.5 cm AAA without rupture post EVAR, paroxysmal afib on Eliquis, early stage Alzheimer's dementia, presented to [facility] 11/10/23 s/p unwitnessed fall with head strike at home found by wife, found with multicompartmental ICH, s/p R craniectomy 11/10/23, course significant for Klebsiella pneumonia, hypernatremia, lethargy, LUE DVT, new anterior falcine SDH, downgraded to SDU 11/28    PLAN:  - will d/w attending  - Q2 neuro checks with protected sleep  - Cranioplasty later today with plastic surgery, NSICU postop  - Helmet when OOB until cranioplasty  - Amantadine 150mg QAM  - Memantine 5mg BID   - Normotensive BP goals, 100-160  - NS@50 while NPO  - Hold ACT/APT  - SCDs for DVT ppx. 2U PRBC on hold for OR  - Supportive care/further medical management per primary team

## 2023-12-07 NOTE — CONSULT NOTE ADULT - ASSESSMENT
ASSESSMENT/PLAN: 83M with extensive PMHx including afib on eliquis, TBI 11/2023 s/p hemicrani with slowing improving neurologic status, taken for cranioplasty 12/7 and admitted to NSICU for post-operative care.    NEURO:   q1hr neurochecks for 24h post-op  ICP precautions - elevate HOB, head midline, maintain normothermia  Keppra 500mg BID switched to IV pending swallow eval  Modafinil 100mg daily, melatonin 5mg at night  CTH 12/8 @ 1AM  pain mgt: tylenol prn, minimize opiates as able  Activity: PT/OT, SLP    PULM:   SpO2>92%  incentive spirometry   Chest PT, suctioning PRN    CV: h/o CAD, HFpEF, afib, LBBB, HTN, HLD  SBP goal 100-160  Lipitor 80mg  Diltiazem 60mg PO BID (1 time dose 5mg IV now while awaiting swallow eval)  Metoprolol 50mg BID    RENAL:   monitor I/O  replete lytes prn  Fluids: IVF while NPO    GI:  Diet: advance diet as tolerated  GI prophylaxis [x] not indicated   zofran prn for nausea  Bowel regimen   Nephrovite    ENDO:   Goal euglycemia (-180)  Fingersticks q6h while NPO    HEME/ONC: h/o HIT  VTE prophylaxis: SCDs  D/w NSGY re: when to resume eliquis for afib and DVT    ID:  Trend WBC and fever curve  Alice-op ancef    Dispo: NSICU for close post-operative neurologic and hemodynamic monitoring

## 2023-12-07 NOTE — BRIEF OPERATIVE NOTE - NSICDXBRIEFPOSTOP_GEN_ALL_CORE_FT
POST-OP DIAGNOSIS:  Intraparenchymal hemorrhage of brain 10-Nov-2023 16:45:44  Suzan Vanessa  
POST-OP DIAGNOSIS:  Intraparenchymal hemorrhage of brain 10-Nov-2023 16:45:44  Suzan Vanessa

## 2023-12-07 NOTE — PROGRESS NOTE ADULT - ASSESSMENT
83M w/ PMH HTN, CAD s/p stents on ASA/Plavix, RCC s/p L nephrectomy, bladder CA, BPH, CHF, LBBB, vertigo, HLD, 5.5 cm AAA without rupture post EVAR, paroxysmal afib on Eliquis, early stage Alzheimer's dementia, presented to [facility] 11/10/23 s/p unwitnessed fall with head strike at home found by wife, found with multicompartmental ICH, s/p R craniectomy 11/10/23, course significant for Klebsiella pneumonia, hypernatremia, lethargy, LUE DVT, new anterior falcine SDH, downgraded to SDU 11/28  s/p R cranioplasty POD#0      Plan  - Q1 neuro checks  - Pain control PRN; avoid oversedation. Tylenol Oxy 5/10   - Keppra 500 BID  - HOB 30 degrees   - Normotension  - Advance diet as tolerated- soft bite sized w/ mod thick liquids   - Resume PO meds when tolerating; Amantadine 150 QAM, Memantine 5 BID, Provigil 100, Lipitor 80, FA, Melatonin, Nephro-roma  - Record BMs; Miralax, Senna  - 10 flat IZABEL x 1; record output   - Ancef while drain in  - Post op CTH 6 hours  - PT/OT  - b/l SCDs; hold AC/AP/chemical DVT prophylaxis at this time  - AM labs  - Medical management/supportive care per NSICU  - D/w Dr. Larios 83M w/ PMH HTN, CAD s/p stents on ASA/Plavix, RCC s/p L nephrectomy, bladder CA, BPH, CHF, LBBB, vertigo, HLD, 5.5 cm AAA without rupture post EVAR, paroxysmal afib on Eliquis, early stage Alzheimer's dementia, presented to [facility] 11/10/23 s/p unwitnessed fall with head strike at home found by wife, found with multicompartmental ICH, s/p R craniectomy 11/10/23, course significant for Klebsiella pneumonia, hypernatremia, lethargy, LUE DVT, new anterior falcine SDH, downgraded to SDU 11/28  s/p R cranioplasty POD#0      Plan  - Q1 neuro checks  - Pain control PRN; avoid oversedation. Tylenol Oxy 5/10   - Keppra 500 BID  - HOB 30 degrees   - Normotension  - Advance diet as tolerated- soft bite sized w/ mod thick liquids   - Resume PO meds when tolerating; Amantadine 150 QAM, Memantine 5 BID, Provigil 100, Lipitor 80, FA, Melatonin, Nephro-roma  - Record BMs; Miralax, Senna  - 10 flat IZABEL x 1; record output   - Ancef while drain in  - Post op CTH 6 hours  - PT/OT  - b/l SCDs; hold AC/AP/chemical DVT prophylaxis at this time  - AM labs  - Medical management/supportive care per NSICU  - D/w Dr. Larios

## 2023-12-07 NOTE — BRIEF OPERATIVE NOTE - NSICDXBRIEFPREOP_GEN_ALL_CORE_FT
PRE-OP DIAGNOSIS:  Intraparenchymal hemorrhage of brain 10-Nov-2023 16:44:16  Suzan Vanessa  
PRE-OP DIAGNOSIS:  Intraparenchymal hemorrhage of brain 10-Nov-2023 16:44:16  Suzan Vanessa

## 2023-12-07 NOTE — PROGRESS NOTE ADULT - SUBJECTIVE AND OBJECTIVE BOX
No eye opening.  One nod to asking how he is doing.  No command following.    FUNCTIONAL PROGRESS  12/7 PMR  Total A    12/4 [facility] [place]  Speech Language Pathology Recommendations: 1. Easy to chew w/moderately thick liquids 2. Aspiration precautions3. Meds whole in puree4. 1:1 assist for all meals5. Upright for all PO, small bites/sips, slow rate6. Oral care7. SLP to follow for diet tolerance monitoring and dysphagia tx, as schedule permits     VITALS  T(C): 36.7 (12-07-23 @ 08:00), Max: 37.1 (12-06-23 @ 20:00)  HR: 74 (12-07-23 @ 08:01) (73 - 92)  BP: 108/60 (12-07-23 @ 08:01) (108/60 - 149/69)  RR: 14 (12-07-23 @ 08:01) (14 - 20)  SpO2: 98% (12-07-23 @ 08:01) (98% - 99%)  Wt(kg): --    MEDICATIONS   acetaminophen     Tablet .. 650 milliGRAM(s) every 6 hours PRN  atorvastatin 80 milliGRAM(s) at bedtime  chlorhexidine 2% Cloths 1 Application(s) <User Schedule>  chlorhexidine 4% Liquid 1 Application(s) daily  diltiazem    Tablet 60 milliGRAM(s) <User Schedule>  folic acid 1 milliGRAM(s) daily  hydrALAZINE Injectable 10 milliGRAM(s) every 2 hours PRN  labetalol Injectable 10 milliGRAM(s) every 2 hours PRN  melatonin 6 milliGRAM(s) at bedtime  metoprolol tartrate 50 milliGRAM(s) <User Schedule>  modafinil 100 milliGRAM(s) daily  Nephro-roma 1 Tablet(s) daily  polyethylene glycol 3350 17 Gram(s) daily  QUEtiapine 12.5 milliGRAM(s) at bedtime PRN  senna 2 Tablet(s) at bedtime      RECENT LABS/IMAGING  - Reviewed Today                        9.5    8.31  )-----------( 198      ( 07 Dec 2023 05:35 )             28.2     12-07    134<L>  |  100  |  22.9<H>  ----------------------------<  104<H>  4.1   |  24.0  |  0.94    Ca    8.9      07 Dec 2023 05:35  Phos  2.9     12-07  Mg     1.7     12-07      PT/INR - ( 07 Dec 2023 05:35 )   PT: 12.2 sec;   INR: 1.10 ratio         PTT - ( 07 Dec 2023 05:35 )  PTT:29.4 sec  Urinalysis Basic - ( 07 Dec 2023 05:35 )    Color: x / Appearance: x / SG: x / pH: x  Gluc: 104 mg/dL / Ketone: x  / Bili: x / Urobili: x   Blood: x / Protein: x / Nitrite: x   Leuk Esterase: x / RBC: x / WBC x   Sq Epi: x / Non Sq Epi: x / Bacteria: x            MR Brain 11/13 - RIGHT frontoparietal craniectomy with underlying intracranial hemorrhage and edema within the RIGHT frontal lobe. Overlying small RIGHT subdural hemorrhage is also unchanged. Scant hemorrhage in the dependent portions of the BILATERAL lateral ventricles and minimal subarachnoid hemorrhage seen in the BILATERAL frontal and parietal lobes. Moderate periventricular and deep white matter ischemia is noted. Encephalomalacia and gliosis seen in the anterior frontal lobes bilaterally. Tiny acute lacunar infarction in the LEFT cerebellum and tiny acute cortical infarction in the RIGHT parietal lobe. Restricted diffusion also noted about the surgical cavity suggesting infarcted parenchyma.    CXR 11/15 - There is mild pulmonary venous congestion. Left retrocardiac/lower lobe opacity could represent associated pneumonia.    HEAD CT 11/16 - Improved to stable multicompartment intracranial hemorrhages. Right-sided subdural hygroma, larger in size.    US V DOPPLER BLE 11/16 - No evidence of deep venous thrombosis in either lower extremity.    HEAD CT 11/22 - Mixed attenuating subdural hematoma again seen with some expected postoperative changes. Evolving area of parenchymal hemorrhage involving the right frontal region.    HEAD CT 11/23 - Stable multicompartment intracranial hemorrhages. Stable right-sided subdural collection.    HEAD CT 11/29 - Stable intracranial hemorrhages.    HEAD CT 12/2  Right hemicraniectomy. Right subdural fluid attenuation collection as seen on the prior. Decreased conspicuity to regions of acute hemorrhage in the right high frontal parasagittal region as well as stable appearance to hemorrhage in the right insular posterior frontal region in association with lucency as seen on the prior study    HEAD CT 12/5 - Unchanged evolving hemorrhage within the RIGHT frontal lobe, medially and laterally with associated edema. RIGHT frontoparietal hemicraniectomy is again noted with unchanged extra-axial. Mild to moderate periventricular white matter ischemia noted.    ----------------------------------------------------------------------------------------  PHYSICAL EXAM  Constitutional - NAD, Comfortable   HEENT - Right craniectomy - CDI  Extremities - No peripheral edema   Neurologic Exam -                    Cognitive - Somnolent     Communication - Limited vebralizations     Motor - Limited exam  Psych - Somnolent  ----------------------------------------------------------------------------------------  ASSESSMENT/PLAN  83yMale with functional deficits after a fall sustaining multicompartmental intracranial bleeding  Wakefulness - Provigil 100mg Q8AM (12/2)  Alzheimer's Dementia - Namenda 5mg BID, Seroquel PRN   CAD, PAFIB - Cardizem, Lopressor, Lipitor, Hydralazine, Labetalol  HTN - Hydralazine, labetalol   Dysphagia - Pureed/mod thick   Sleep - Melatonin    Oropharyngeal Dysphagia - Soft & MOD THICK Liquids  DVT PPX - SCD   Rehab/Impaired mobility and function - Patient continues to require hospitalization for the above diagnoses and ongoing active management of comorbid complications (pending cranioplasty, IV HTN medications) that are substantially impairing functional ability and impairing quality of life necessitating ongoing medical management of these complications.     When medically optimized, based on the patient's diagnosis, current functional status, and potential for progress, recommend HOLLY, patient DOES NOT meet acute inpatient rehabilitation criteria. Patient needs a more prolonged stay to achieve transition to community living and would not be able to tolerate a comprehensive/intense rehab program of 3hours/day.     Will continue to follow. Rehab recommendations are dependent on how functional progress changes as well as how patient continues to participate and tolerate therapeutic interventions, which may change. Recommend ongoing mobilization by staff to maintain cardiopulmonary function and prevention of secondary complications related to debility. Discussed the specific management and recommendations above with rehab clinical care team/rehab liaison.       No eye opening.  One nod to asking how he is doing.  No command following.    FUNCTIONAL PROGRESS  12/7 PMR  Total A    12/4 [facility] [place]  Speech Language Pathology Recommendations: 1. Easy to chew w/moderately thick liquids 2. Aspiration precautions3. Meds whole in puree4. 1:1 assist for all meals5. Upright for all PO, small bites/sips, slow rate6. Oral care7. SLP to follow for diet tolerance monitoring and dysphagia tx, as schedule permits     VITALS  T(C): 36.7 (12-07-23 @ 08:00), Max: 37.1 (12-06-23 @ 20:00)  HR: 74 (12-07-23 @ 08:01) (73 - 92)  BP: 108/60 (12-07-23 @ 08:01) (108/60 - 149/69)  RR: 14 (12-07-23 @ 08:01) (14 - 20)  SpO2: 98% (12-07-23 @ 08:01) (98% - 99%)  Wt(kg): --    MEDICATIONS   acetaminophen     Tablet .. 650 milliGRAM(s) every 6 hours PRN  atorvastatin 80 milliGRAM(s) at bedtime  chlorhexidine 2% Cloths 1 Application(s) <User Schedule>  chlorhexidine 4% Liquid 1 Application(s) daily  diltiazem    Tablet 60 milliGRAM(s) <User Schedule>  folic acid 1 milliGRAM(s) daily  hydrALAZINE Injectable 10 milliGRAM(s) every 2 hours PRN  labetalol Injectable 10 milliGRAM(s) every 2 hours PRN  melatonin 6 milliGRAM(s) at bedtime  metoprolol tartrate 50 milliGRAM(s) <User Schedule>  modafinil 100 milliGRAM(s) daily  Nephro-roma 1 Tablet(s) daily  polyethylene glycol 3350 17 Gram(s) daily  QUEtiapine 12.5 milliGRAM(s) at bedtime PRN  senna 2 Tablet(s) at bedtime      RECENT LABS/IMAGING  - Reviewed Today                        9.5    8.31  )-----------( 198      ( 07 Dec 2023 05:35 )             28.2     12-07    134<L>  |  100  |  22.9<H>  ----------------------------<  104<H>  4.1   |  24.0  |  0.94    Ca    8.9      07 Dec 2023 05:35  Phos  2.9     12-07  Mg     1.7     12-07      PT/INR - ( 07 Dec 2023 05:35 )   PT: 12.2 sec;   INR: 1.10 ratio         PTT - ( 07 Dec 2023 05:35 )  PTT:29.4 sec  Urinalysis Basic - ( 07 Dec 2023 05:35 )    Color: x / Appearance: x / SG: x / pH: x  Gluc: 104 mg/dL / Ketone: x  / Bili: x / Urobili: x   Blood: x / Protein: x / Nitrite: x   Leuk Esterase: x / RBC: x / WBC x   Sq Epi: x / Non Sq Epi: x / Bacteria: x            MR Brain 11/13 - RIGHT frontoparietal craniectomy with underlying intracranial hemorrhage and edema within the RIGHT frontal lobe. Overlying small RIGHT subdural hemorrhage is also unchanged. Scant hemorrhage in the dependent portions of the BILATERAL lateral ventricles and minimal subarachnoid hemorrhage seen in the BILATERAL frontal and parietal lobes. Moderate periventricular and deep white matter ischemia is noted. Encephalomalacia and gliosis seen in the anterior frontal lobes bilaterally. Tiny acute lacunar infarction in the LEFT cerebellum and tiny acute cortical infarction in the RIGHT parietal lobe. Restricted diffusion also noted about the surgical cavity suggesting infarcted parenchyma.    CXR 11/15 - There is mild pulmonary venous congestion. Left retrocardiac/lower lobe opacity could represent associated pneumonia.    HEAD CT 11/16 - Improved to stable multicompartment intracranial hemorrhages. Right-sided subdural hygroma, larger in size.    US V DOPPLER BLE 11/16 - No evidence of deep venous thrombosis in either lower extremity.    HEAD CT 11/22 - Mixed attenuating subdural hematoma again seen with some expected postoperative changes. Evolving area of parenchymal hemorrhage involving the right frontal region.    HEAD CT 11/23 - Stable multicompartment intracranial hemorrhages. Stable right-sided subdural collection.    HEAD CT 11/29 - Stable intracranial hemorrhages.    HEAD CT 12/2  Right hemicraniectomy. Right subdural fluid attenuation collection as seen on the prior. Decreased conspicuity to regions of acute hemorrhage in the right high frontal parasagittal region as well as stable appearance to hemorrhage in the right insular posterior frontal region in association with lucency as seen on the prior study    HEAD CT 12/5 - Unchanged evolving hemorrhage within the RIGHT frontal lobe, medially and laterally with associated edema. RIGHT frontoparietal hemicraniectomy is again noted with unchanged extra-axial. Mild to moderate periventricular white matter ischemia noted.    ----------------------------------------------------------------------------------------  PHYSICAL EXAM  Constitutional - NAD, Comfortable   HEENT - Right craniectomy - CDI  Extremities - No peripheral edema   Neurologic Exam -                    Cognitive - Somnolent     Communication - Limited vebralizations     Motor - Limited exam  Psych - Somnolent  ----------------------------------------------------------------------------------------  ASSESSMENT/PLAN  83yMale with functional deficits after a fall sustaining multicompartmental intracranial bleeding  Wakefulness - Provigil 100mg Q8AM (12/2)  Alzheimer's Dementia - Namenda 5mg BID, Seroquel PRN   CAD, PAFIB - Cardizem, Lopressor, Lipitor, Hydralazine, Labetalol  HTN - Hydralazine, labetalol   Dysphagia - Pureed/mod thick   Sleep - Melatonin    Oropharyngeal Dysphagia - Soft & MOD THICK Liquids  DVT PPX - SCD   Rehab/Impaired mobility and function - Patient continues to require hospitalization for the above diagnoses and ongoing active management of comorbid complications (pending cranioplasty, IV HTN medications) that are substantially impairing functional ability and impairing quality of life necessitating ongoing medical management of these complications.     When medically optimized, based on the patient's diagnosis, current functional status, and potential for progress, recommend HOLLY, patient DOES NOT meet acute inpatient rehabilitation criteria. Patient needs a more prolonged stay to achieve transition to community living and would not be able to tolerate a comprehensive/intense rehab program of 3hours/day.     Will continue to follow. Rehab recommendations are dependent on how functional progress changes as well as how patient continues to participate and tolerate therapeutic interventions, which may change. Recommend ongoing mobilization by staff to maintain cardiopulmonary function and prevention of secondary complications related to debility. Discussed the specific management and recommendations above with rehab clinical care team/rehab liaison.

## 2023-12-07 NOTE — PROGRESS NOTE ADULT - SUBJECTIVE AND OBJECTIVE BOX
POST-OPERATIVE NOTE  Procedure: Right cranioplasty  Diagnosis/Indication: [facility]  Surgeon: Dr. Larios & Dr. Heart     INTERVAL HPI/ACUTE EVENTS:  83y Male PMH HTN, CAD s/p stents on ASA/Plavix, RCC s/p L nephrectomy, bladder CA, BPH, CHF, LBBB, vertigo, HLD, 5.5 cm AAA without rupture post EVAR, paroxysmal afib on Eliquis, early stage Alzheimer's dementia, presented to [facility] 11/10/23 s/p unwitnessed fall with head strike at home found by wife, found with multicompartmental ICH, s/p R craniectomy 11/10/23, course significant for Klebsiella pneumonia, hypernatremia, lethargy, LUE DVT, new anterior falcine SDH, downgraded to SDU 11/28    VITALS:  T(C): 36.3 (12-07-23 @ 19:55), Max: 37 (12-07-23 @ 04:00)  HR: 91 (12-07-23 @ 21:30) (73 - 97)  BP: 97/58 (12-07-23 @ 21:30) (95/64 - 151/100)  RR: 15 (12-07-23 @ 21:30) (11 - 20)  SpO2: 98% (12-07-23 @ 21:30) (95% - 99%)  Wt(kg): --    PHYSICAL EXAM:  GENERAL: NAD, well-groomed, well-developed  HEAD:  S/p L crani. Dressing clean, dry, intact. Dried blood noted, not fully saturated.  DRAINS: Subgaleal/epidural/subdural drain to bulb suction/thumbprint suction/gravity. Serosanguinous drainage noted.  NECK: C-collar in place. Dressing clean, dry, intact  WOUND: Dressing clean dry intact  JOEY COMA SCORE: E- V- M- =       E: 4= opens eyes spontaneously 3= to voice 2= to noxious 1= no opening       V: 5= oriented 4= confused 3= inappropriate words 2= incomprehensible sounds 1= nonverbal 1T= intubated       M: 6= follows commands 5= localizes 4= withdraws 3= flexor posturing 2= extensor posturing 1= no movement  MENTAL STATUS: AAO x3; Awake/Comatose; Opens eyes spontaneously/to voice/to light touch/to noxious stimuli; Appropriately conversant without aphasia/Nonverbal; following simple commands/mimicking/not following commands  CRANIAL NERVES: Visual acuity normal for age, visual fields full to confrontation, PERRL. EOMI without nystagmus. Facial sensation intact V1-3 distribution b/l. Face symmetric w/ normal eye closure and smile, tongue midline. Hearing grossly intact. Speech clear. Head turning and shoulder shrug intact.   REFLEXES: PERRL. Corneals intact b/l. Gag intact. Cough intact. Oculocephalic reflex intact (Doll's eye). Negative Jason's b/l. Negative clonus b/l  MOTOR: strength 5/5 b/l upper and lower extremities  Uppers     Delt (C5/6)     Bicep (C5/6)     Wrist Extend (C6)     Tricep (C7)     HG (C8/T1)  R                     5/5                 5/5                         5/5                           5/5                   5/5  L                      5/5                 5/5                         5/5                           5/5                   5/5  Lowers      HF(L1/L2)     KE (L3)     DF (L4)     EHL (L5)     PF (S1)      R                     5/5              5/5           5/5           5/5            5/5  L                     5/5               5/5          5/5            5/5            5/5  SENSATION: grossly intact to light touch all extremities  COORDINATION: Gait intact; rapid alternating movements intact; heel to shin intact; no upper extremity dysmetria  CHEST/LUNG: Clear to auscultation bilaterally; no rales, rhonchi, wheezing, or rubs  HEART: +S1/+S2; Regular rate and rhythm; no murmurs, rubs, or gallops  ABDOMEN: Soft, nontender, nondistended; bowel sounds present all four quadrants  EXTREMITIES:  2+ peripheral pulses, no clubbing, cyanosis, or edema  SKIN: Warm, dry; no rashes or lesions    LABS:                        9.5    8.31  )-----------( 198      ( 07 Dec 2023 05:35 )             28.2     12-07    134<L>  |  100  |  22.9<H>  ----------------------------<  104<H>  4.1   |  24.0  |  0.94    Ca    8.9      07 Dec 2023 05:35  Phos  2.9     12-07  Mg     1.7     12-07        RADIOLOGY/OTHER:    CAPRINI SCORE [CLOT]:  Patient has an estimated Caprini score of greater than 5.  However, the patient's unique clinical situation will be addressed in an individual manner to determine appropriate anticoagulation treatment, if any. POST-OPERATIVE NOTE  Procedure: Right cranioplasty  Diagnosis/Indication: [facility]  Surgeon: Dr. Larios & Dr. Heart     INTERVAL HPI/ACUTE EVENTS:  83y Male PMH HTN, CAD s/p stents on ASA/Plavix, RCC s/p L nephrectomy, bladder CA, BPH, CHF, LBBB, vertigo, HLD, 5.5 cm AAA without rupture post EVAR, paroxysmal afib on Eliquis, early stage Alzheimer's dementia, presented to [facility] 11/10/23 s/p unwitnessed fall with head strike at home found by wife, found with multicompartmental ICH, s/p R craniectomy 11/10/23, course significant for Klebsiella pneumonia, hypernatremia, lethargy, LUE DVT, new anterior falcine SDH, downgraded to SDU 11/28    VITALS:  T(C): 36.3 (12-07-23 @ 19:55), Max: 37 (12-07-23 @ 04:00)  HR: 91 (12-07-23 @ 21:30) (73 - 97)  BP: 97/58 (12-07-23 @ 21:30) (95/64 - 151/100)  RR: 15 (12-07-23 @ 21:30) (11 - 20)  SpO2: 98% (12-07-23 @ 21:30) (95% - 99%)  Wt(kg): --    PHYSICAL EXAM:  GENERAL: NAD, well-groomed, well-developed  HEAD:  S/p L crani. Dressing clean, dry, intact. Dried blood noted, not fully saturated.  DRAINS: Subgaleal/epidural/subdural drain to bulb suction/thumbprint suction/gravity. Serosanguinous drainage noted.  NECK: C-collar in place. Dressing clean, dry, intact  WOUND: Dressing clean dry intact  JOEY COMA SCORE: E- V- M- =       E: 4= opens eyes spontaneously 3= to voice 2= to noxious 1= no opening       V: 5= oriented 4= confused 3= inappropriate words 2= incomprehensible sounds 1= nonverbal 1T= intubated       M: 6= follows commands 5= localizes 4= withdraws 3= flexor posturing 2= extensor posturing 1= no movement  MENTAL STATUS: AAO x3; Awake/Comatose; Opens eyes spontaneously/to voice/to light touch/to noxious stimuli; Appropriately conversant without aphasia/Nonverbal; following simple commands/mimicking/not following commands  CRANIAL NERVES: Visual acuity normal for age, visual fields full to confrontation, PERRL. EOMI without nystagmus. Facial sensation intact V1-3 distribution b/l. Face symmetric w/ normal eye closure and smile, tongue midline. Hearing grossly intact. Speech clear. Head turning and shoulder shrug intact.   REFLEXES: PERRL. Corneals intact b/l. Gag intact. Cough intact. Oculocephalic reflex intact (Doll's eye). Negative Jason's b/l. Negative clonus b/l  MOTOR: strength 5/5 b/l upper and lower extremities  Uppers     Delt (C5/6)     Bicep (C5/6)     Wrist Extend (C6)     Tricep (C7)     HG (C8/T1)  R                     5/5                 5/5                         5/5                           5/5                   5/5  L                      5/5                 5/5                         5/5                           5/5                   5/5  Lowers      HF(L1/L2)     KE (L3)     DF (L4)     EHL (L5)     PF (S1)      R                     5/5              5/5           5/5           5/5            5/5  L                     5/5               5/5          5/5            5/5            5/5  SENSATION: grossly intact to light touch all extremities  COORDINATION: Gait intact; rapid alternating movements intact; heel to shin intact; no upper extremity dysmetria  CHEST/LUNG: Clear to auscultation bilaterally; no rales, rhonchi, wheezing, or rubs  HEART: +S1/+S2; Regular rate and rhythm; no murmurs, rubs, or gallops  ABDOMEN: Soft, nontender, nondistended; bowel sounds present all four quadrants  EXTREMITIES:  2+ peripheral pulses, no clubbing, cyanosis, or edema  SKIN: Warm, dry; no rashes or lesions    LABS:                        9.5    8.31  )-----------( 198      ( 07 Dec 2023 05:35 )             28.2     12-07    134<L>  |  100  |  22.9<H>  ----------------------------<  104<H>  4.1   |  24.0  |  0.94    Ca    8.9      07 Dec 2023 05:35  Phos  2.9     12-07  Mg     1.7     12-07        RADIOLOGY/OTHER:    CAPRINI SCORE [CLOT]:  Patient has an estimated Caprini score of greater than 5.  However, the patient's unique clinical situation will be addressed in an individual manner to determine appropriate anticoagulation treatment, if any. POST-OPERATIVE NOTE  Procedure: Right cranioplasty  Diagnosis/Indication: [facility]  Surgeon: Dr. Larios & Dr. Torres    INTERVAL HPI/ACUTE EVENTS:  83M w/ PMH HTN, CAD s/p stents on ASA/Plavix, RCC s/p L nephrectomy, bladder CA, BPH, CHF, LBBB, vertigo, HLD, 5.5 cm AAA without rupture post EVAR, paroxysmal afib on Eliquis, early stage Alzheimer's dementia, presented to [facility] 11/10/23 s/p unwitnessed fall with head strike at home found by wife, found with multicompartmental ICH, s/p R craniectomy 11/10/23, course significant for Klebsiella pneumonia, hypernatremia, lethargy, LUE DVT, new anterior falcine SDH, downgraded to SDU 11/28  s/p R cranioplasty POD#0    VITALS:  T(C): 36.3 (12-07-23 @ 19:55), Max: 37 (12-07-23 @ 04:00)  HR: 91 (12-07-23 @ 21:30) (73 - 97)  BP: 97/58 (12-07-23 @ 21:30) (95/64 - 151/100)  RR: 15 (12-07-23 @ 21:30) (11 - 20)  SpO2: 98% (12-07-23 @ 21:30) (95% - 99%)  Wt(kg): --    PHYSICAL EXAM:  GENERAL: NAD, well-groomed, well-developed  HEAD:  S/p L crani. Dressing clean, dry, intact. Dried blood noted, not fully saturated.  DRAINS: Subgaleal/epidural/subdural drain to bulb suction/thumbprint suction/gravity. Serosanguinous drainage noted.  NECK: C-collar in place. Dressing clean, dry, intact  WOUND: Dressing clean dry intact  JOEY COMA SCORE: E- V- M- =       E: 4= opens eyes spontaneously 3= to voice 2= to noxious 1= no opening       V: 5= oriented 4= confused 3= inappropriate words 2= incomprehensible sounds 1= nonverbal 1T= intubated       M: 6= follows commands 5= localizes 4= withdraws 3= flexor posturing 2= extensor posturing 1= no movement  MENTAL STATUS: AAO x3; Awake/Comatose; Opens eyes spontaneously/to voice/to light touch/to noxious stimuli; Appropriately conversant without aphasia/Nonverbal; following simple commands/mimicking/not following commands  CRANIAL NERVES: Visual acuity normal for age, visual fields full to confrontation, PERRL. EOMI without nystagmus. Facial sensation intact V1-3 distribution b/l. Face symmetric w/ normal eye closure and smile, tongue midline. Hearing grossly intact. Speech clear. Head turning and shoulder shrug intact.   REFLEXES: PERRL. Corneals intact b/l. Gag intact. Cough intact. Oculocephalic reflex intact (Doll's eye). Negative Jason's b/l. Negative clonus b/l  MOTOR: strength 5/5 b/l upper and lower extremities  Uppers     Delt (C5/6)     Bicep (C5/6)     Wrist Extend (C6)     Tricep (C7)     HG (C8/T1)  R                     5/5                 5/5                         5/5                           5/5                   5/5  L                      5/5                 5/5                         5/5                           5/5                   5/5  Lowers      HF(L1/L2)     KE (L3)     DF (L4)     EHL (L5)     PF (S1)      R                     5/5              5/5           5/5           5/5            5/5  L                     5/5               5/5          5/5            5/5            5/5  SENSATION: grossly intact to light touch all extremities  COORDINATION: Gait intact; rapid alternating movements intact; heel to shin intact; no upper extremity dysmetria  CHEST/LUNG: Clear to auscultation bilaterally; no rales, rhonchi, wheezing, or rubs  HEART: +S1/+S2; Regular rate and rhythm; no murmurs, rubs, or gallops  ABDOMEN: Soft, nontender, nondistended; bowel sounds present all four quadrants  EXTREMITIES:  2+ peripheral pulses, no clubbing, cyanosis, or edema  SKIN: Warm, dry; no rashes or lesions    LABS:                        9.5    8.31  )-----------( 198      ( 07 Dec 2023 05:35 )             28.2     12-07    134<L>  |  100  |  22.9<H>  ----------------------------<  104<H>  4.1   |  24.0  |  0.94    Ca    8.9      07 Dec 2023 05:35  Phos  2.9     12-07  Mg     1.7     12-07        RADIOLOGY/OTHER:    CAPRINI SCORE [CLOT]:  Patient has an estimated Caprini score of greater than 5.  However, the patient's unique clinical situation will be addressed in an individual manner to determine appropriate anticoagulation treatment, if any. POST-OPERATIVE NOTE  Procedure: Right cranioplasty  Diagnosis/Indication: [facility]  Surgeon: Dr. Larios & Dr. Torres    INTERVAL HPI/ACUTE EVENTS:  83M w/ PMH HTN, CAD s/p stents on ASA/Plavix, RCC s/p L nephrectomy, bladder CA, BPH, CHF, LBBB, vertigo, HLD, 5.5 cm AAA without rupture post EVAR, paroxysmal afib on Eliquis, early stage Alzheimer's dementia, presented to [facility] [place] 11/10/23 s/p unwitnessed fall with head strike at home found by wife, found with multicompartmental ICH, s/p R craniectomy 11/10/23, course significant for Klebsiella pneumonia, hypernatremia, lethargy, LUE DVT, new anterior falcine SDH, downgraded to SDU 11/28  s/p R cranioplasty POD#0    VITALS:  T(C): 36.3 (12-07-23 @ 19:55), Max: 37 (12-07-23 @ 04:00)  HR: 91 (12-07-23 @ 21:30) (73 - 97)  BP: 97/58 (12-07-23 @ 21:30) (95/64 - 151/100)  RR: 15 (12-07-23 @ 21:30) (11 - 20)  SpO2: 98% (12-07-23 @ 21:30) (95% - 99%)  Wt(kg): --    PHYSICAL EXAM:  GENERAL: NAD, well-groomed, well-developed  HEAD:  S/p L crani. Dressing clean, dry, intact. Dried blood noted, not fully saturated.  DRAINS: Subgaleal/epidural/subdural drain to bulb suction/thumbprint suction/gravity. Serosanguinous drainage noted.  NECK: C-collar in place. Dressing clean, dry, intact  WOUND: Dressing clean dry intact  JOEY COMA SCORE: E- V- M- =       E: 4= opens eyes spontaneously 3= to voice 2= to noxious 1= no opening       V: 5= oriented 4= confused 3= inappropriate words 2= incomprehensible sounds 1= nonverbal 1T= intubated       M: 6= follows commands 5= localizes 4= withdraws 3= flexor posturing 2= extensor posturing 1= no movement  MENTAL STATUS: AAO x3; Awake/Comatose; Opens eyes spontaneously/to voice/to light touch/to noxious stimuli; Appropriately conversant without aphasia/Nonverbal; following simple commands/mimicking/not following commands  CRANIAL NERVES: Visual acuity normal for age, visual fields full to confrontation, PERRL. EOMI without nystagmus. Facial sensation intact V1-3 distribution b/l. Face symmetric w/ normal eye closure and smile, tongue midline. Hearing grossly intact. Speech clear. Head turning and shoulder shrug intact.   REFLEXES: PERRL. Corneals intact b/l. Gag intact. Cough intact. Oculocephalic reflex intact (Doll's eye). Negative Jason's b/l. Negative clonus b/l  MOTOR: strength 5/5 b/l upper and lower extremities  Uppers     Delt (C5/6)     Bicep (C5/6)     Wrist Extend (C6)     Tricep (C7)     HG (C8/T1)  R                     5/5                 5/5                         5/5                           5/5                   5/5  L                      5/5                 5/5                         5/5                           5/5                   5/5  Lowers      HF(L1/L2)     KE (L3)     DF (L4)     EHL (L5)     PF (S1)      R                     5/5              5/5           5/5           5/5            5/5  L                     5/5               5/5          5/5            5/5            5/5  SENSATION: grossly intact to light touch all extremities  COORDINATION: Gait intact; rapid alternating movements intact; heel to shin intact; no upper extremity dysmetria  CHEST/LUNG: Clear to auscultation bilaterally; no rales, rhonchi, wheezing, or rubs  HEART: +S1/+S2; Regular rate and rhythm; no murmurs, rubs, or gallops  ABDOMEN: Soft, nontender, nondistended; bowel sounds present all four quadrants  EXTREMITIES:  2+ peripheral pulses, no clubbing, cyanosis, or edema  SKIN: Warm, dry; no rashes or lesions    LABS:                        9.5    8.31  )-----------( 198      ( 07 Dec 2023 05:35 )             28.2     12-07    134<L>  |  100  |  22.9<H>  ----------------------------<  104<H>  4.1   |  24.0  |  0.94    Ca    8.9      07 Dec 2023 05:35  Phos  2.9     12-07  Mg     1.7     12-07        RADIOLOGY/OTHER:    CAPRINI SCORE [CLOT]:  Patient has an estimated Caprini score of greater than 5.  However, the patient's unique clinical situation will be addressed in an individual manner to determine appropriate anticoagulation treatment, if any. POST-OPERATIVE NOTE  Procedure: Right cranioplasty  Diagnosis/Indication: [facility]  Surgeon: Dr. Larios & Dr. Torres    INTERVAL HPI/ACUTE EVENTS:  83M w/ PMH HTN, CAD s/p stents on ASA/Plavix, RCC s/p L nephrectomy, bladder CA, BPH, CHF, LBBB, vertigo, HLD, 5.5 cm AAA without rupture post EVAR, paroxysmal afib on Eliquis, early stage Alzheimer's dementia, presented to [facility] 11/10/23 s/p unwitnessed fall with head strike at home found by wife, found with multicompartmental ICH, s/p R craniectomy 11/10/23, course significant for Klebsiella pneumonia, hypernatremia, lethargy, LUE DVT, new anterior falcine SDH, downgraded to SDU 11/28  s/p R cranioplasty POD#0    VITALS:  T(C): 36.3 (12-07-23 @ 19:55), Max: 37 (12-07-23 @ 04:00)  HR: 91 (12-07-23 @ 21:30) (73 - 97)  BP: 97/58 (12-07-23 @ 21:30) (95/64 - 151/100)  RR: 15 (12-07-23 @ 21:30) (11 - 20)  SpO2: 98% (12-07-23 @ 21:30) (95% - 99%)  Wt(kg): --    PHYSICAL EXAM:  GENERAL: NAD, well-groomed, well-developed  HEAD:  S/p R crani. Dressing clean, dry, intact. Dried blood noted, not fully saturated.  DRAINS: 10 flat IZABEL x 1; bloody drainage noted. Serosanguinous drainage noted.  WOUND: Dressing clean dry intact  JOEY COMA SCORE: E- V- M- =       E: 4= opens eyes spontaneously 3= to voice 2= to noxious 1= no opening       V: 5= oriented 4= confused 3= inappropriate words 2= incomprehensible sounds 1= nonverbal 1T= intubated       M: 6= follows commands 5= localizes 4= withdraws 3= flexor posturing 2= extensor posturing 1= no movement  MENTAL STATUS: AAO x3; Awake/Comatose; Opens eyes spontaneously/to voice/to light touch/to noxious stimuli; Appropriately conversant without aphasia/Nonverbal; following simple commands/mimicking/not following commands  CRANIAL NERVES: Visual acuity normal for age, visual fields full to confrontation, PERRL. EOMI without nystagmus. Facial sensation intact V1-3 distribution b/l. Face symmetric w/ normal eye closure and smile, tongue midline. Hearing grossly intact. Speech clear. Head turning and shoulder shrug intact.   REFLEXES: PERRL. Corneals intact b/l. Gag intact. Cough intact. Oculocephalic reflex intact (Doll's eye). Negative Jason's b/l. Negative clonus b/l  MOTOR: strength 5/5 b/l upper and lower extremities  Uppers     Delt (C5/6)     Bicep (C5/6)     Wrist Extend (C6)     Tricep (C7)     HG (C8/T1)  R                     5/5                 5/5                         5/5                           5/5                   5/5  L                      5/5                 5/5                         5/5                           5/5                   5/5  Lowers      HF(L1/L2)     KE (L3)     DF (L4)     EHL (L5)     PF (S1)      R                     5/5              5/5           5/5           5/5            5/5  L                     5/5               5/5          5/5            5/5            5/5  SENSATION: dec R sided sensation  breaths  SKIN: Warm, dry    LABS:                        9.5    8.31  )-----------( 198      ( 07 Dec 2023 05:35 )             28.2     12-07    134<L>  |  100  |  22.9<H>  ----------------------------<  104<H>  4.1   |  24.0  |  0.94    Ca    8.9      07 Dec 2023 05:35  Phos  2.9     12-07  Mg     1.7     12-07        RADIOLOGY/OTHER:    CAPRINI SCORE [CLOT]:  Patient has an estimated Caprini score of greater than 5.  However, the patient's unique clinical situation will be addressed in an individual manner to determine appropriate anticoagulation treatment, if any. POST-OPERATIVE NOTE  Procedure: Right cranioplasty  Diagnosis/Indication: [facility]  Surgeon: Dr. Larios & Dr. Torres    INTERVAL HPI/ACUTE EVENTS:  83M w/ PMH HTN, CAD s/p stents on ASA/Plavix, RCC s/p L nephrectomy, bladder CA, BPH, CHF, LBBB, vertigo, HLD, 5.5 cm AAA without rupture post EVAR, paroxysmal afib on Eliquis, early stage Alzheimer's dementia, presented to [facility] 11/10/23 s/p unwitnessed fall with head strike at home found by wife, found with multicompartmental ICH, s/p R craniectomy 11/10/23, course significant for Klebsiella pneumonia, hypernatremia, lethargy, LUE DVT, new anterior falcine SDH, downgraded to SDU 11/28  s/p R cranioplasty POD#0    VITALS:  T(C): 36.3 (12-07-23 @ 19:55), Max: 37 (12-07-23 @ 04:00)  HR: 91 (12-07-23 @ 21:30) (73 - 97)  BP: 97/58 (12-07-23 @ 21:30) (95/64 - 151/100)  RR: 15 (12-07-23 @ 21:30) (11 - 20)  SpO2: 98% (12-07-23 @ 21:30) (95% - 99%)  Wt(kg): --    PHYSICAL EXAM:  GENERAL: NAD, well-groomed, well-developed  HEAD:  S/p R crani. Dressing clean, dry, intact. Dried blood noted, not fully saturated.  DRAINS: 10 flat IZABEL x 1; bloody drainage noted. Serosanguinous drainage noted.  WOUND: Dressing clean dry intact  JOEY COMA SCORE: E- V- M- =       E: 4= opens eyes spontaneously 3= to voice 2= to noxious 1= no opening       V: 5= oriented 4= confused 3= inappropriate words 2= incomprehensible sounds 1= nonverbal 1T= intubated       M: 6= follows commands 5= localizes 4= withdraws 3= flexor posturing 2= extensor posturing 1= no movement  MENTAL STATUS: AAO x3; Awake/Comatose; Opens eyes spontaneously/to voice/to light touch/to noxious stimuli; Appropriately conversant without aphasia/Nonverbal; following simple commands/mimicking/not following commands  CRANIAL NERVES: Visual acuity normal for age, visual fields full to confrontation, PERRL. EOMI without nystagmus. Facial sensation intact V1-3 distribution b/l. Face symmetric w/ normal eye closure and smile, tongue midline. Hearing grossly intact. Speech clear. Head turning and shoulder shrug intact.   REFLEXES: PERRL. Corneals intact b/l. Gag intact. Cough intact. Oculocephalic reflex intact (Doll's eye). Negative Jason's b/l. Negative clonus b/l  MOTOR: strength 5/5 b/l upper and lower extremities  Uppers     Delt (C5/6)     Bicep (C5/6)     Wrist Extend (C6)     Tricep (C7)     HG (C8/T1)  R                     5/5                 5/5                         5/5                           5/5                   5/5  L                      5/5                 5/5                         5/5                           5/5                   5/5  Lowers      HF(L1/L2)     KE (L3)     DF (L4)     EHL (L5)     PF (S1)      R                     5/5              5/5           5/5           5/5            5/5  L                     5/5               5/5          5/5            5/5            5/5  SENSATION: dec R sided sensation  breaths  SKIN: Warm, dry    LABS:                        9.5    8.31  )-----------( 198      ( 07 Dec 2023 05:35 )             28.2     12-07    134<L>  |  100  |  22.9<H>  ----------------------------<  104<H>  4.1   |  24.0  |  0.94    Ca    8.9      07 Dec 2023 05:35  Phos  2.9     12-07  Mg     1.7     12-07        RADIOLOGY/OTHER:    CAPRINI SCORE [CLOT]:  Patient has an estimated Caprini score of greater than 5.  However, the patient's unique clinical situation will be addressed in an individual manner to determine appropriate anticoagulation treatment, if any. POST-OPERATIVE NOTE  Procedure: Right cranioplasty  Diagnosis/Indication: [facility]  Surgeon: Dr. Larios & Dr. Torres    INTERVAL HPI/ACUTE EVENTS:  83M w/ PMH HTN, CAD s/p stents on ASA/Plavix, RCC s/p L nephrectomy, bladder CA, BPH, CHF, LBBB, vertigo, HLD, 5.5 cm AAA without rupture post EVAR, paroxysmal afib on Eliquis, early stage Alzheimer's dementia, presented to [facility] 11/10/23 s/p unwitnessed fall with head strike at home found by wife, found with multicompartmental ICH, s/p R craniectomy 11/10/23, course significant for Klebsiella pneumonia, hypernatremia, lethargy, LUE DVT, new anterior falcine SDH, downgraded to SDU 11/28  s/p R cranioplasty POD#0  Patient seen and examined in NSICU. Opens eyes spontaneously but closes after prolong questions/exams. Can sustain when wants to participate. Mouthing last name, intermittently giving thumbs up w/ R hand.     VITALS:  T(C): 36.3 (12-07-23 @ 19:55), Max: 37 (12-07-23 @ 04:00)  HR: 91 (12-07-23 @ 21:30) (73 - 97)  BP: 97/58 (12-07-23 @ 21:30) (95/64 - 151/100)  RR: 15 (12-07-23 @ 21:30) (11 - 20)  SpO2: 98% (12-07-23 @ 21:30) (95% - 99%)  Wt(kg): --    PHYSICAL EXAM:  GENERAL: NAD, well-groomed  HEAD:  S/p R crani. Dressing clean, dry, intact. Dried blood noted, not fully saturated.  DRAINS: 10 flat IZABEL x 1; bloody drainage noted. Serosanguinous drainage noted.  WOUND: Dressing clean dry intact  JOEY COMA SCORE: E-4 V-4 M-6 = 14  MENTAL STATUS: AAO x1(name, says at home), says last name, minimally conversant. following simple commands on R, thumbs up, opening eyes, following most EOM.  CRANIAL NERVES: PERRL. Tracking examiner. Gaze midline  MOTOR: strength 4/5 RUE. RLE spontaneously bending at knee. LUE/LLE trace WD.    SENSATION: dec L sided sensation. grossly intact on R sided  SKIN: Warm, dry    LABS:                        9.5    8.31  )-----------( 198      ( 07 Dec 2023 05:35 )             28.2     12-07    134<L>  |  100  |  22.9<H>  ----------------------------<  104<H>  4.1   |  24.0  |  0.94    Ca    8.9      07 Dec 2023 05:35  Phos  2.9     12-07  Mg     1.7     12-07      RADIOLOGY & ADDITIONAL TESTS:  CT Head No Cont (12.05.23 @ 11:17)  IMPRESSION:   Unchanged evolving hemorrhage within the RIGHT frontal   lobe, medially and laterally with associated edema. RIGHT frontoparietal   hemicraniectomy is again noted with unchanged extra-axial. Mild to   moderate periventricular white matter ischemia noted. POST-OPERATIVE NOTE  Procedure: Right cranioplasty  Diagnosis/Indication: [facility]  Surgeon: Dr. Larios & Dr. Torres    INTERVAL HPI/ACUTE EVENTS:  83M w/ PMH HTN, CAD s/p stents on ASA/Plavix, RCC s/p L nephrectomy, bladder CA, BPH, CHF, LBBB, vertigo, HLD, 5.5 cm AAA without rupture post EVAR, paroxysmal afib on Eliquis, early stage Alzheimer's dementia, presented to [facility] 11/10/23 s/p unwitnessed fall with head strike at home found by wife, found with multicompartmental ICH, s/p R craniectomy 11/10/23, course significant for Klebsiella pneumonia, hypernatremia, lethargy, LUE DVT, new anterior falcine SDH, downgraded to SDU 11/28  s/p R cranioplasty POD#0  Patient seen and examined in NSICU. Opens eyes spontaneously but closes after prolong questions/exams. Can sustain when wants to participate. Mouthing last name, intermittently giving thumbs up w/ R hand.     VITALS:  T(C): 36.3 (12-07-23 @ 19:55), Max: 37 (12-07-23 @ 04:00)  HR: 91 (12-07-23 @ 21:30) (73 - 97)  BP: 97/58 (12-07-23 @ 21:30) (95/64 - 151/100)  RR: 15 (12-07-23 @ 21:30) (11 - 20)  SpO2: 98% (12-07-23 @ 21:30) (95% - 99%)  Wt(kg): --    PHYSICAL EXAM:  GENERAL: NAD, well-groomed  HEAD:  S/p R crani. Dressing clean, dry, intact. Dried blood noted, not fully saturated.  DRAINS: 10 flat IZABEL x 1; bloody drainage noted. Serosanguinous drainage noted.  WOUND: Dressing clean dry intact  JOEY COMA SCORE: E-4 V-4 M-6 = 14  MENTAL STATUS: AAO x1(name, says at home), says last name, minimally conversant. following simple commands on R, thumbs up, opening eyes, following most EOM.  CRANIAL NERVES: PERRL. Tracking examiner. Gaze midline  MOTOR: strength 4/5 RUE. RLE spontaneously bending at knee. LUE/LLE trace WD.    SENSATION: dec L sided sensation. grossly intact on R sided  SKIN: Warm, dry    LABS:                        9.5    8.31  )-----------( 198      ( 07 Dec 2023 05:35 )             28.2     12-07    134<L>  |  100  |  22.9<H>  ----------------------------<  104<H>  4.1   |  24.0  |  0.94    Ca    8.9      07 Dec 2023 05:35  Phos  2.9     12-07  Mg     1.7     12-07      RADIOLOGY & ADDITIONAL TESTS:  CT Head No Cont (12.05.23 @ 11:17)  IMPRESSION:   Unchanged evolving hemorrhage within the RIGHT frontal   lobe, medially and laterally with associated edema. RIGHT frontoparietal   hemicraniectomy is again noted with unchanged extra-axial. Mild to   moderate periventricular white matter ischemia noted.

## 2023-12-07 NOTE — PROGRESS NOTE ADULT - SUBJECTIVE AND OBJECTIVE BOX
Patient is a 83y old  Male who presents with a chief complaint of s/p unwitnessed fall with head strike (07 Dec 2023 09:59)      No acute issues.pt is aaox2 am.  REVIEW OF SYSTEMS: All systems are reviewed and found to be negative except above not reliable    MEDICATIONS  (STANDING):  atorvastatin 80 milliGRAM(s) Oral at bedtime  chlorhexidine 2% Cloths 1 Application(s) Topical <User Schedule>  chlorhexidine 4% Liquid 1 Application(s) Topical daily  diltiazem    Tablet 60 milliGRAM(s) Oral <User Schedule>  folic acid 1 milliGRAM(s) Oral daily  melatonin 6 milliGRAM(s) Oral at bedtime  metoprolol tartrate 50 milliGRAM(s) Oral <User Schedule>  modafinil 100 milliGRAM(s) Oral <User Schedule>  Nephro-roma 1 Tablet(s) Oral daily  polyethylene glycol 3350 17 Gram(s) Oral daily  senna 2 Tablet(s) Oral at bedtime  sodium chloride 0.9%. 1000 milliLiter(s) (50 mL/Hr) IV Continuous <Continuous>    MEDICATIONS  (PRN):  acetaminophen     Tablet .. 650 milliGRAM(s) Oral every 6 hours PRN Temp greater or equal to 38C (100.4F), Mild Pain (1 - 3)  hydrALAZINE Injectable 10 milliGRAM(s) IV Push every 2 hours PRN SBP>160  labetalol Injectable 10 milliGRAM(s) IV Push every 2 hours PRN SBP>160  QUEtiapine 12.5 milliGRAM(s) Oral at bedtime PRN delirium      CAPILLARY BLOOD GLUCOSE        I&O's Summary    06 Dec 2023 07:01  -  07 Dec 2023 07:00  --------------------------------------------------------  IN: 0 mL / OUT: 1800 mL / NET: -1800 mL    07 Dec 2023 07:01  -  07 Dec 2023 13:57  --------------------------------------------------------  IN: 150 mL / OUT: 0 mL / NET: 150 mL        PHYSICAL EXAM:  Vital Signs Last 24 Hrs  T(C): 36.2 (07 Dec 2023 12:00), Max: 37.1 (06 Dec 2023 20:00)  T(F): 97.1 (07 Dec 2023 12:00), Max: 98.8 (06 Dec 2023 20:00)  HR: 89 (07 Dec 2023 12:01) (73 - 92)  BP: 133/64 (07 Dec 2023 12:01) (108/60 - 148/84)  BP(mean): 78 (07 Dec 2023 12:01) (66 - 99)  RR: 19 (07 Dec 2023 12:01) (14 - 20)  SpO2: 99% (07 Dec 2023 12:01) (97% - 99%)    Parameters below as of 07 Dec 2023 12:01  Patient On (Oxygen Delivery Method): room air        CONSTITUTIONAL: NAD,  EYES: PERRLA; conjunctiva and sclera clear  ENMT: Moist oral mucosa,   RESPIRATORY: Normal respiratory effort; lungs are clear to auscultation bilaterally  CARDIOVASCULAR: Regular rate and rhythm, normal S1 and S2, no murmur   EXTS: No lower extremity edema; Peripheral pulses are 2+ bilaterally  ABDOMEN: Nontender to palpation, normoactive bowel sounds, no rebound/guarding;   MUSCLOSKELETAL:no joint swelling or tenderness to palpation  PSYCH: cam  NEUROLOGY: A+O to person, on/off place, and not time; no acute change      LABS:                        9.5    8.31  )-----------( 198      ( 07 Dec 2023 05:35 )             28.2     12-07    134<L>  |  100  |  22.9<H>  ----------------------------<  104<H>  4.1   |  24.0  |  0.94    Ca    8.9      07 Dec 2023 05:35  Phos  2.9     12-07  Mg     1.7     12-07      PT/INR - ( 07 Dec 2023 05:35 )   PT: 12.2 sec;   INR: 1.10 ratio         PTT - ( 07 Dec 2023 05:35 )  PTT:29.4 sec      Urinalysis Basic - ( 07 Dec 2023 05:35 )    Color: x / Appearance: x / SG: x / pH: x  Gluc: 104 mg/dL / Ketone: x  / Bili: x / Urobili: x   Blood: x / Protein: x / Nitrite: x   Leuk Esterase: x / RBC: x / WBC x   Sq Epi: x / Non Sq Epi: x / Bacteria: x          RADIOLOGY & ADDITIONAL TESTS:  Results Reviewed:    Patient is a 83y old  Male who presents with a chief complaint of s/p unwitnessed fall with head strike (07 Dec 2023 09:59)      No acute issues.pt is aaox2 am.  REVIEW OF SYSTEMS: All systems are reviewed and found to be negative except above not reliable    MEDICATIONS  (STANDING):  atorvastatin 80 milliGRAM(s) Oral at bedtime  chlorhexidine 2% Cloths 1 Application(s) Topical <User Schedule>  chlorhexidine 4% Liquid 1 Application(s) Topical daily  diltiazem    Tablet 60 milliGRAM(s) Oral <User Schedule>  folic acid 1 milliGRAM(s) Oral daily  melatonin 6 milliGRAM(s) Oral at bedtime  metoprolol tartrate 50 milliGRAM(s) Oral <User Schedule>  modafinil 100 milliGRAM(s) Oral <User Schedule>  Nephro-roma 1 Tablet(s) Oral daily  polyethylene glycol 3350 17 Gram(s) Oral daily  senna 2 Tablet(s) Oral at bedtime  sodium chloride 0.9%. 1000 milliLiter(s) (50 mL/Hr) IV Continuous <Continuous>    MEDICATIONS  (PRN):  acetaminophen     Tablet .. 650 milliGRAM(s) Oral every 6 hours PRN Temp greater or equal to 38C (100.4F), Mild Pain (1 - 3)  hydrALAZINE Injectable 10 milliGRAM(s) IV Push every 2 hours PRN SBP>160  labetalol Injectable 10 milliGRAM(s) IV Push every 2 hours PRN SBP>160  QUEtiapine 12.5 milliGRAM(s) Oral at bedtime PRN delirium      CAPILLARY BLOOD GLUCOSE        I&O's Summary    06 Dec 2023 07:01  -  07 Dec 2023 07:00  --------------------------------------------------------  IN: 0 mL / OUT: 1800 mL / NET: -1800 mL    07 Dec 2023 07:01  -  07 Dec 2023 13:57  --------------------------------------------------------  IN: 150 mL / OUT: 0 mL / NET: 150 mL        PHYSICAL EXAM:  Vital Signs Last 24 Hrs  T(C): 36.2 (07 Dec 2023 12:00), Max: 37.1 (06 Dec 2023 20:00)  T(F): 97.1 (07 Dec 2023 12:00), Max: 98.8 (06 Dec 2023 20:00)  HR: 89 (07 Dec 2023 12:01) (73 - 92)  BP: 133/64 (07 Dec 2023 12:01) (108/60 - 148/84)  BP(mean): 78 (07 Dec 2023 12:01) (66 - 99)  RR: 19 (07 Dec 2023 12:01) (14 - 20)  SpO2: 99% (07 Dec 2023 12:01) (97% - 99%)    Parameters below as of 07 Dec 2023 12:01  Patient On (Oxygen Delivery Method): room air        CONSTITUTIONAL: NAD,  EYES: PERRLA; conjunctiva and sclera clear  ENMT: Moist oral mucosa,   RESPIRATORY: Normal respiratory effort; lungs are clear to auscultation bilaterally  CARDIOVASCULAR: Regular rate and rhythm, normal S1 and S2, no murmur   EXTS: No lower extremity edema; Peripheral pulses are 2+ bilaterally  ABDOMEN: Nontender to palpation, normoactive bowel sounds, no rebound/guarding;   MUSCLOSKELETAL:no joint swelling or tenderness to palpation  PSYCH: cam  NEUROLOGY: A+O to person, no to  place, and not time; no acute change      LABS:                        9.5    8.31  )-----------( 198      ( 07 Dec 2023 05:35 )             28.2     12-07    134<L>  |  100  |  22.9<H>  ----------------------------<  104<H>  4.1   |  24.0  |  0.94    Ca    8.9      07 Dec 2023 05:35  Phos  2.9     12-07  Mg     1.7     12-07      PT/INR - ( 07 Dec 2023 05:35 )   PT: 12.2 sec;   INR: 1.10 ratio         PTT - ( 07 Dec 2023 05:35 )  PTT:29.4 sec      Urinalysis Basic - ( 07 Dec 2023 05:35 )    Color: x / Appearance: x / SG: x / pH: x  Gluc: 104 mg/dL / Ketone: x  / Bili: x / Urobili: x   Blood: x / Protein: x / Nitrite: x   Leuk Esterase: x / RBC: x / WBC x   Sq Epi: x / Non Sq Epi: x / Bacteria: x          RADIOLOGY & ADDITIONAL TESTS:  Results Reviewed:

## 2023-12-07 NOTE — PROGRESS NOTE ADULT - NS ATTEND AMEND GEN_ALL_CORE FT
Neurosurgery Attending Attestation:    Patient seen and examined at bedside. Agree with plan and note as documented above.    82y M with PMH HTN, CAD s/p stents, renal cell carcinoma status post left nephrectomy, bladder CA, BPH, CHF, LBBB, vertigo,  HLD, 5.5cm AAA without rupture post EVAR, paroxysmal A-fib on Eliquis, Plavix, and aspirin, early stage Alzheimer dementia transferred from [facility] s/p unwitnessed fall with head strike with R temporal ICH s/p craniectomy for evacuation on 11/10/2023 now with sunken flap. Plan for cranioplasty    Discussed risks and benefits of surgery with patient and family. Benefits include replacement of bone flap. Risks include but are not limited to bleeding, pain, infection, motor/sensory/cognitive deficits, stroke, coma, paralysis, death and need for repeat or further surgery. Patient and family expressed understanding of the risks and benefits and wish to proceed.    Exam:  Awake, alert  Oriented to self, not hospital or year  PERRL, EOMI, L facial droop  5/5 RUE  LUE trace movement  5/5 RLE  LLE withdraws to pain  SILT    Plan:  -proceed to OR for R cranioplasty Neurosurgery Attending Attestation:    Patient seen and examined at bedside. Agree with plan and note as documented above.    82y M with PMH HTN, CAD s/p stents, renal cell carcinoma status post left nephrectomy, bladder CA, BPH, CHF, LBBB, vertigo,  HLD, 5.5cm AAA without rupture post EVAR, paroxysmal A-fib on Eliquis, Plavix, and aspirin, early stage Alzheimer dementia transferred from [facility] s/p unwitnessed fall with head strike with R temporal ICH s/p craniectomy for evacuation on 11/10/2023 now with sunken flap. Plan for cranioplasty    Discussed risks and benefits of surgery with patient and family. Benefits include replacement of bone flap. Risks include but are not limited to bleeding, pain, infection, motor/sensory/cognitive deficits, stroke, coma, paralysis, death and need for repeat or further surgery. Patient and family expressed understanding of the risks and benefits and wish to proceed.    Exam:  Awake, alert  Oriented to self, not hospital or year  PERRL, EOMI, L facial droop  5/5 RUE  LUE trace movement  5/5 RLE  LLE withdraws to pain  SILT    Plan:  -proceed to OR for R cranioplasty

## 2023-12-08 LAB
ANION GAP SERPL CALC-SCNC: 10 MMOL/L — SIGNIFICANT CHANGE UP (ref 5–17)
ANION GAP SERPL CALC-SCNC: 10 MMOL/L — SIGNIFICANT CHANGE UP (ref 5–17)
ANION GAP SERPL CALC-SCNC: 11 MMOL/L — SIGNIFICANT CHANGE UP (ref 5–17)
ANION GAP SERPL CALC-SCNC: 11 MMOL/L — SIGNIFICANT CHANGE UP (ref 5–17)
BUN SERPL-MCNC: 22.1 MG/DL — HIGH (ref 8–20)
BUN SERPL-MCNC: 22.1 MG/DL — HIGH (ref 8–20)
BUN SERPL-MCNC: 23.2 MG/DL — HIGH (ref 8–20)
BUN SERPL-MCNC: 23.2 MG/DL — HIGH (ref 8–20)
CALCIUM SERPL-MCNC: 9 MG/DL — SIGNIFICANT CHANGE UP (ref 8.4–10.5)
CALCIUM SERPL-MCNC: 9 MG/DL — SIGNIFICANT CHANGE UP (ref 8.4–10.5)
CALCIUM SERPL-MCNC: 9.1 MG/DL — SIGNIFICANT CHANGE UP (ref 8.4–10.5)
CALCIUM SERPL-MCNC: 9.1 MG/DL — SIGNIFICANT CHANGE UP (ref 8.4–10.5)
CHLORIDE SERPL-SCNC: 105 MMOL/L — SIGNIFICANT CHANGE UP (ref 96–108)
CO2 SERPL-SCNC: 22 MMOL/L — SIGNIFICANT CHANGE UP (ref 22–29)
CO2 SERPL-SCNC: 22 MMOL/L — SIGNIFICANT CHANGE UP (ref 22–29)
CO2 SERPL-SCNC: 23 MMOL/L — SIGNIFICANT CHANGE UP (ref 22–29)
CO2 SERPL-SCNC: 23 MMOL/L — SIGNIFICANT CHANGE UP (ref 22–29)
CREAT SERPL-MCNC: 1.01 MG/DL — SIGNIFICANT CHANGE UP (ref 0.5–1.3)
CREAT SERPL-MCNC: 1.01 MG/DL — SIGNIFICANT CHANGE UP (ref 0.5–1.3)
CREAT SERPL-MCNC: 1.02 MG/DL — SIGNIFICANT CHANGE UP (ref 0.5–1.3)
CREAT SERPL-MCNC: 1.02 MG/DL — SIGNIFICANT CHANGE UP (ref 0.5–1.3)
EGFR: 73 ML/MIN/1.73M2 — SIGNIFICANT CHANGE UP
EGFR: 73 ML/MIN/1.73M2 — SIGNIFICANT CHANGE UP
EGFR: 74 ML/MIN/1.73M2 — SIGNIFICANT CHANGE UP
EGFR: 74 ML/MIN/1.73M2 — SIGNIFICANT CHANGE UP
GLUCOSE BLDC GLUCOMTR-MCNC: 120 MG/DL — HIGH (ref 70–99)
GLUCOSE BLDC GLUCOMTR-MCNC: 120 MG/DL — HIGH (ref 70–99)
GLUCOSE SERPL-MCNC: 111 MG/DL — HIGH (ref 70–99)
GLUCOSE SERPL-MCNC: 111 MG/DL — HIGH (ref 70–99)
GLUCOSE SERPL-MCNC: 121 MG/DL — HIGH (ref 70–99)
GLUCOSE SERPL-MCNC: 121 MG/DL — HIGH (ref 70–99)
HCT VFR BLD CALC: 31.3 % — LOW (ref 39–50)
HCT VFR BLD CALC: 31.3 % — LOW (ref 39–50)
HGB BLD-MCNC: 10.4 G/DL — LOW (ref 13–17)
HGB BLD-MCNC: 10.4 G/DL — LOW (ref 13–17)
MAGNESIUM SERPL-MCNC: 1.7 MG/DL — SIGNIFICANT CHANGE UP (ref 1.6–2.6)
MAGNESIUM SERPL-MCNC: 1.7 MG/DL — SIGNIFICANT CHANGE UP (ref 1.6–2.6)
MCHC RBC-ENTMCNC: 31 PG — SIGNIFICANT CHANGE UP (ref 27–34)
MCHC RBC-ENTMCNC: 31 PG — SIGNIFICANT CHANGE UP (ref 27–34)
MCHC RBC-ENTMCNC: 33.2 GM/DL — SIGNIFICANT CHANGE UP (ref 32–36)
MCHC RBC-ENTMCNC: 33.2 GM/DL — SIGNIFICANT CHANGE UP (ref 32–36)
MCV RBC AUTO: 93.4 FL — SIGNIFICANT CHANGE UP (ref 80–100)
MCV RBC AUTO: 93.4 FL — SIGNIFICANT CHANGE UP (ref 80–100)
PHOSPHATE SERPL-MCNC: 4.1 MG/DL — SIGNIFICANT CHANGE UP (ref 2.4–4.7)
PHOSPHATE SERPL-MCNC: 4.1 MG/DL — SIGNIFICANT CHANGE UP (ref 2.4–4.7)
PLATELET # BLD AUTO: 225 K/UL — SIGNIFICANT CHANGE UP (ref 150–400)
PLATELET # BLD AUTO: 225 K/UL — SIGNIFICANT CHANGE UP (ref 150–400)
POTASSIUM SERPL-MCNC: 5 MMOL/L — SIGNIFICANT CHANGE UP (ref 3.5–5.3)
POTASSIUM SERPL-MCNC: 5 MMOL/L — SIGNIFICANT CHANGE UP (ref 3.5–5.3)
POTASSIUM SERPL-MCNC: 5.4 MMOL/L — HIGH (ref 3.5–5.3)
POTASSIUM SERPL-MCNC: 5.4 MMOL/L — HIGH (ref 3.5–5.3)
POTASSIUM SERPL-SCNC: 5 MMOL/L — SIGNIFICANT CHANGE UP (ref 3.5–5.3)
POTASSIUM SERPL-SCNC: 5 MMOL/L — SIGNIFICANT CHANGE UP (ref 3.5–5.3)
POTASSIUM SERPL-SCNC: 5.4 MMOL/L — HIGH (ref 3.5–5.3)
POTASSIUM SERPL-SCNC: 5.4 MMOL/L — HIGH (ref 3.5–5.3)
RBC # BLD: 3.35 M/UL — LOW (ref 4.2–5.8)
RBC # BLD: 3.35 M/UL — LOW (ref 4.2–5.8)
RBC # FLD: 14.3 % — SIGNIFICANT CHANGE UP (ref 10.3–14.5)
RBC # FLD: 14.3 % — SIGNIFICANT CHANGE UP (ref 10.3–14.5)
SODIUM SERPL-SCNC: 137 MMOL/L — SIGNIFICANT CHANGE UP (ref 135–145)
SODIUM SERPL-SCNC: 137 MMOL/L — SIGNIFICANT CHANGE UP (ref 135–145)
SODIUM SERPL-SCNC: 139 MMOL/L — SIGNIFICANT CHANGE UP (ref 135–145)
SODIUM SERPL-SCNC: 139 MMOL/L — SIGNIFICANT CHANGE UP (ref 135–145)
WBC # BLD: 9.62 K/UL — SIGNIFICANT CHANGE UP (ref 3.8–10.5)
WBC # BLD: 9.62 K/UL — SIGNIFICANT CHANGE UP (ref 3.8–10.5)
WBC # FLD AUTO: 9.62 K/UL — SIGNIFICANT CHANGE UP (ref 3.8–10.5)
WBC # FLD AUTO: 9.62 K/UL — SIGNIFICANT CHANGE UP (ref 3.8–10.5)

## 2023-12-08 PROCEDURE — 99233 SBSQ HOSP IP/OBS HIGH 50: CPT | Mod: GC

## 2023-12-08 PROCEDURE — 99232 SBSQ HOSP IP/OBS MODERATE 35: CPT

## 2023-12-08 PROCEDURE — 70450 CT HEAD/BRAIN W/O DYE: CPT | Mod: 26

## 2023-12-08 PROCEDURE — 70450 CT HEAD/BRAIN W/O DYE: CPT | Mod: 26,77

## 2023-12-08 RX ORDER — METOPROLOL TARTRATE 50 MG
5 TABLET ORAL ONCE
Refills: 0 | Status: COMPLETED | OUTPATIENT
Start: 2023-12-08 | End: 2023-12-08

## 2023-12-08 RX ADMIN — Medication 60 MILLIGRAM(S): at 06:01

## 2023-12-08 RX ADMIN — Medication 2000 MILLIGRAM(S): at 22:00

## 2023-12-08 RX ADMIN — Medication 2000 MILLIGRAM(S): at 14:08

## 2023-12-08 RX ADMIN — SODIUM CHLORIDE 1000 MILLILITER(S): 9 INJECTION INTRAMUSCULAR; INTRAVENOUS; SUBCUTANEOUS at 06:22

## 2023-12-08 RX ADMIN — ATORVASTATIN CALCIUM 80 MILLIGRAM(S): 80 TABLET, FILM COATED ORAL at 22:00

## 2023-12-08 RX ADMIN — Medication 60 MILLIGRAM(S): at 17:54

## 2023-12-08 RX ADMIN — Medication 1 MILLIGRAM(S): at 11:46

## 2023-12-08 RX ADMIN — Medication 5 MILLIGRAM(S): at 22:00

## 2023-12-08 RX ADMIN — Medication 2000 MILLIGRAM(S): at 06:22

## 2023-12-08 RX ADMIN — SENNA PLUS 2 TABLET(S): 8.6 TABLET ORAL at 22:00

## 2023-12-08 RX ADMIN — Medication 5 MILLIGRAM(S): at 00:22

## 2023-12-08 RX ADMIN — CHLORHEXIDINE GLUCONATE 1 APPLICATION(S): 213 SOLUTION TOPICAL at 11:47

## 2023-12-08 RX ADMIN — Medication 50 MILLIGRAM(S): at 11:46

## 2023-12-08 RX ADMIN — Medication 1 TABLET(S): at 12:25

## 2023-12-08 RX ADMIN — MODAFINIL 100 MILLIGRAM(S): 200 TABLET ORAL at 08:49

## 2023-12-08 RX ADMIN — POLYETHYLENE GLYCOL 3350 17 GRAM(S): 17 POWDER, FOR SOLUTION ORAL at 11:46

## 2023-12-08 RX ADMIN — LEVETIRACETAM 400 MILLIGRAM(S): 250 TABLET, FILM COATED ORAL at 06:01

## 2023-12-08 RX ADMIN — LEVETIRACETAM 400 MILLIGRAM(S): 250 TABLET, FILM COATED ORAL at 17:44

## 2023-12-08 NOTE — CHART NOTE - NSCHARTNOTEFT_GEN_A_CORE
NSCU Transfer Note    Transfer from: NSCU    Transfer to: (  ) Medicine    (  ) Telemetry    (  ) RCU   ( x ) Neurosurgery                               (  ) Palliative    (  ) Stroke Unit    (  ) MICU    (  ) __________________    Accepting Physician: Dr. Larios    Signout given to: NSGY team    HPI / NSCU COURSE:    83M w/ PMH HTN, CAD s/p stents on ASA/Plavix, RCC s/p L nephrectomy, bladder CA, BPH, CHF, LBBB, vertigo, HLD, 5.5 cm AAA without rupture post EVAR, paroxysmal afib on Eliquis, early stage Alzheimer's dementia, presented to [facility] 11/10/23 s/p unwitnessed fall with head strike at home found by wife, found with multicompartmental ICH, s/p R craniectomy 11/10/23, course significant for Klebsiella pneumonia, hypernatremia, lethargy, LUE DVT, new anterior falcine SDH, downgraded to SDU 11/28  s/p R cranioplasty POD#1  post op CTH ordered for stable w/ postoperative changes, exam stable. Requiring R wrist restraint for reaching for wound. SG IZABEL in place.        Vital Signs Last 24 Hrs  T(C): 36.5 (08 Dec 2023 11:51), Max: 36.9 (07 Dec 2023 15:09)  T(F): 97.7 (08 Dec 2023 11:51), Max: 98.4 (07 Dec 2023 15:09)  HR: 119 (08 Dec 2023 12:00) (80 - 123)  BP: 144/87 (08 Dec 2023 12:00) (95/64 - 160/89)  BP(mean): 104 (08 Dec 2023 12:00) (69 - 112)  RR: 20 (08 Dec 2023 12:00) (9 - 24)  SpO2: 98% (08 Dec 2023 12:00) (95% - 100%)    Parameters below as of 07 Dec 2023 19:55  Patient On (Oxygen Delivery Method): room air        I&O's Summary    07 Dec 2023 07:01  -  08 Dec 2023 07:00  --------------------------------------------------------  IN: 1500 mL / OUT: 860 mL / NET: 640 mL    08 Dec 2023 07:01  -  08 Dec 2023 13:08  --------------------------------------------------------  IN: 150 mL / OUT: 30 mL / NET: 120 mL        Physical Exam:   AAOX3, cooperative, follows commands, speech fluent, comprehension seem intact, face symmetric, tongue midline, EOMI, PERRLA, motor - CORTES 5/5 with no drift, sensation intact.  S1S2 present.  CTAB, no crackles.  Abd soft, NT, ND.  No peripheral swelling.    LABS:                               10.4   9.62  )-----------( 225      ( 08 Dec 2023 04:44 )             31.3       12-08    139  |  105  |  23.2<H>  ----------------------------<  111<H>  5.0   |  23.0  |  1.02    Ca    9.1      08 Dec 2023 11:15  Phos  4.1     12-08  Mg     1.7     12-08        PT/INR - ( 07 Dec 2023 05:35 )   PT: 12.2 sec;   INR: 1.10 ratio         PTT - ( 07 Dec 2023 05:35 )  PTT:29.4 sec        Imaging:      ASSESSMENT & PLAN:  Plan  - Q4 neuro checks  - CTH 12/9 AM  - Pain control PRN; avoid oversedation. Tylenol Oxy 5/10   - Keppra 500 BID  - HOB 30 degrees   - Normotension  - soft bite sized w/ mod thick liquids   - Resume PO meds: Amantadine 150 QAM, Memantine 5 BID, Provigil 100, Lipitor 80, FA, Melatonin, Nephro-roma  - Miralax, Senna  - 10 flat IZABEL x 1; record output   - Ancef while drain in  - Post op CTH- post operative changes  - PT/OT  - b/l SCDs; hold AC/AP/chemical DVT prophylaxis at this time until 12/9am CTH  - AM labs  - Wound: May remove dressing on POD#3, 12/10. May shower on POD#5, using gentle shampoo, pat dry, leave open to air.  - Plastics following   - Medical management/supportive care per Neurosurgery  - D/w Dr. Larios NSCU Transfer Note    Transfer from: NSCU    Transfer to: (  ) Medicine    (  ) Telemetry    (  ) RCU   ( x ) Neurosurgery                               (  ) Palliative    (  ) Stroke Unit    (  ) MICU    (  ) __________________    Accepting Physician: Dr. Larios    Signout given to: NSGY team    HPI / NSCU COURSE:    83M w/ PMH HTN, CAD s/p stents on ASA/Plavix, RCC s/p L nephrectomy, bladder CA, BPH, CHF, LBBB, vertigo, HLD, 5.5 cm AAA without rupture post EVAR, paroxysmal afib on Eliquis, early stage Alzheimer's dementia, presented to [facility] 11/10/23 s/p unwitnessed fall with head strike at home found by wife, found with multicompartmental ICH, s/p R craniectomy 11/10/23, course significant for Klebsiella pneumonia, hypernatremia, lethargy, LUE DVT, new anterior falcine SDH, downgraded to SDU 11/28  s/p R cranioplasty POD#1  post op CTH ordered for stable w/ postoperative changes, exam stable. Requiring R wrist restraint for reaching for wound. SG IZABEL in place.        Vital Signs Last 24 Hrs  T(C): 36.5 (08 Dec 2023 11:51), Max: 36.9 (07 Dec 2023 15:09)  T(F): 97.7 (08 Dec 2023 11:51), Max: 98.4 (07 Dec 2023 15:09)  HR: 119 (08 Dec 2023 12:00) (80 - 123)  BP: 144/87 (08 Dec 2023 12:00) (95/64 - 160/89)  BP(mean): 104 (08 Dec 2023 12:00) (69 - 112)  RR: 20 (08 Dec 2023 12:00) (9 - 24)  SpO2: 98% (08 Dec 2023 12:00) (95% - 100%)    Parameters below as of 07 Dec 2023 19:55  Patient On (Oxygen Delivery Method): room air        I&O's Summary    07 Dec 2023 07:01  -  08 Dec 2023 07:00  --------------------------------------------------------  IN: 1500 mL / OUT: 860 mL / NET: 640 mL    08 Dec 2023 07:01  -  08 Dec 2023 13:08  --------------------------------------------------------  IN: 150 mL / OUT: 30 mL / NET: 120 mL        Physical Exam:   AAOX3, cooperative, follows commands, speech fluent, comprehension seem intact, face symmetric, tongue midline, EOMI, PERRLA, motor - CORTES 5/5 with no drift, sensation intact.  S1S2 present.  CTAB, no crackles.  Abd soft, NT, ND.  No peripheral swelling.    LABS:                               10.4   9.62  )-----------( 225      ( 08 Dec 2023 04:44 )             31.3       12-08    139  |  105  |  23.2<H>  ----------------------------<  111<H>  5.0   |  23.0  |  1.02    Ca    9.1      08 Dec 2023 11:15  Phos  4.1     12-08  Mg     1.7     12-08        PT/INR - ( 07 Dec 2023 05:35 )   PT: 12.2 sec;   INR: 1.10 ratio         PTT - ( 07 Dec 2023 05:35 )  PTT:29.4 sec        Imaging:      ASSESSMENT & PLAN:  Plan  - Q4 neuro checks  - CTH 12/9 AM  - Pain control PRN; avoid oversedation. Tylenol Oxy 5/10   - Keppra 500 BID  - HOB 30 degrees   - Normotension  - soft bite sized w/ mod thick liquids   - Resume PO meds: Amantadine 150 QAM, Memantine 5 BID, Provigil 100, Lipitor 80, FA, Melatonin, Nephro-roma  - Miralax, Senna  - 10 flat IZABEL x 1; record output   - Ancef while drain in  - Post op CTH- post operative changes  - PT/OT  - b/l SCDs; hold AC/AP/chemical DVT prophylaxis at this time until 12/9am CTH  - AM labs  - Wound: May remove dressing on POD#3, 12/10. May shower on POD#5, using gentle shampoo, pat dry, leave open to air.  - Plastics following   - Medical management/supportive care per Neurosurgery  - D/w Dr. Larios NSCU Transfer Note    Transfer from: NSCU    Transfer to: (  ) Medicine    (  ) Telemetry    (  ) RCU   ( x ) Neurosurgery                               (  ) Palliative    (  ) Stroke Unit    (  ) MICU    (  ) __________________    Accepting Physician: Dr. Larios    Signout given to: NSGY team    HPI / NSCU COURSE:    83M w/ PMH HTN, CAD s/p stents on ASA/Plavix, RCC s/p L nephrectomy, bladder CA, BPH, CHF, LBBB, vertigo, HLD, 5.5 cm AAA without rupture post EVAR, paroxysmal afib on Eliquis, early stage Alzheimer's dementia, presented to [facility] 11/10/23 s/p unwitnessed fall with head strike at home found by wife, found with multicompartmental ICH, s/p R craniectomy 11/10/23, course significant for Klebsiella pneumonia, hypernatremia, lethargy, LUE DVT, new anterior falcine SDH, downgraded to SDU 11/28  s/p R cranioplasty POD#1  post op CTH ordered for stable w/ postoperative changes, exam stable. Requiring R wrist restraint for reaching for wound. SG IZABEL in place.        Vital Signs Last 24 Hrs  T(C): 36.5 (08 Dec 2023 11:51), Max: 36.9 (07 Dec 2023 15:09)  T(F): 97.7 (08 Dec 2023 11:51), Max: 98.4 (07 Dec 2023 15:09)  HR: 119 (08 Dec 2023 12:00) (80 - 123)  BP: 144/87 (08 Dec 2023 12:00) (95/64 - 160/89)  BP(mean): 104 (08 Dec 2023 12:00) (69 - 112)  RR: 20 (08 Dec 2023 12:00) (9 - 24)  SpO2: 98% (08 Dec 2023 12:00) (95% - 100%)    Parameters below as of 07 Dec 2023 19:55  Patient On (Oxygen Delivery Method): room air        I&O's Summary    07 Dec 2023 07:01  -  08 Dec 2023 07:00  --------------------------------------------------------  IN: 1500 mL / OUT: 860 mL / NET: 640 mL    08 Dec 2023 07:01  -  08 Dec 2023 13:08  --------------------------------------------------------  IN: 150 mL / OUT: 30 mL / NET: 120 mL        GENERAL: NAD, well-groomed  HEAD:  S/p R crani. Dressing clean, dry, intact. Dried blood noted, not fully saturated.  DRAINS: 10 flat IZABEL x 1; bloody drainage noted. Serosanguinous drainage noted.  WOUND: Dressing clean dry intact  JOEY COMA SCORE: E-4 V-4 M-6 = 14  MENTAL STATUS: AAO x1(name, says at home), says last name, minimally conversant. following simple commands on R, thumbs up, opening eyes, following most EOM.  MOTOR: strength 4/5 RUE. RLE spontaneously bending at knee. LUE/LLE trace WD.    SENSATION: dec L sided sensation. grossly intact on R sided    LABS:                           10.4   9.62  )-----------( 225      ( 08 Dec 2023 04:44 )             31.3       12-08    139  |  105  |  23.2<H>  ----------------------------<  111<H>  5.0   |  23.0  |  1.02    Ca    9.1      08 Dec 2023 11:15  Phos  4.1     12-08  Mg     1.7     12-08        PT/INR - ( 07 Dec 2023 05:35 )   PT: 12.2 sec;   INR: 1.10 ratio         PTT - ( 07 Dec 2023 05:35 )  PTT:29.4 sec        Imaging:      ASSESSMENT & PLAN:  Plan  - Q4 neuro checks  - CT 12/9 AM  - Pain control PRN; avoid oversedation. Tylenol Oxy 5/10   - Keppra 500 BID  - HOB 30 degrees   - Normotension  - soft bite sized w/ mod thick liquids   - Resume PO meds: Amantadine 150 QAM, Memantine 5 BID, Provigil 100, Lipitor 80, FA, Melatonin, Nephro-roma  - Miralax, Senna  - 10 flat IZABEL x 1; record output   - Ancef while drain in  - Post op CTH- post operative changes  - PT/OT  - b/l SCDs; hold AC/AP/chemical DVT prophylaxis at this time until 12/9am CTH  - AM labs  - Wound: May remove dressing on POD#3, 12/10. May shower on POD#5, using gentle shampoo, pat dry, leave open to air.  - Plastics following   - Medical management/supportive care per Neurosurgery  - D/w Dr. Larios NSCU Transfer Note    Transfer from: NSCU    Transfer to: (  ) Medicine    (  ) Telemetry    (  ) RCU   ( x ) Neurosurgery                               (  ) Palliative    (  ) Stroke Unit    (  ) MICU    (  ) __________________    Accepting Physician: Dr. Larios    Signout given to: NSGY team    HPI / NSCU COURSE:    83M w/ PMH HTN, CAD s/p stents on ASA/Plavix, RCC s/p L nephrectomy, bladder CA, BPH, CHF, LBBB, vertigo, HLD, 5.5 cm AAA without rupture post EVAR, paroxysmal afib on Eliquis, early stage Alzheimer's dementia, presented to [facility] 11/10/23 s/p unwitnessed fall with head strike at home found by wife, found with multicompartmental ICH, s/p R craniectomy 11/10/23, course significant for Klebsiella pneumonia, hypernatremia, lethargy, LUE DVT, new anterior falcine SDH, downgraded to SDU 11/28  s/p R cranioplasty POD#1  post op CTH ordered for stable w/ postoperative changes, exam stable. Requiring R wrist restraint for reaching for wound. SG IZABEL in place.        Vital Signs Last 24 Hrs  T(C): 36.5 (08 Dec 2023 11:51), Max: 36.9 (07 Dec 2023 15:09)  T(F): 97.7 (08 Dec 2023 11:51), Max: 98.4 (07 Dec 2023 15:09)  HR: 119 (08 Dec 2023 12:00) (80 - 123)  BP: 144/87 (08 Dec 2023 12:00) (95/64 - 160/89)  BP(mean): 104 (08 Dec 2023 12:00) (69 - 112)  RR: 20 (08 Dec 2023 12:00) (9 - 24)  SpO2: 98% (08 Dec 2023 12:00) (95% - 100%)    Parameters below as of 07 Dec 2023 19:55  Patient On (Oxygen Delivery Method): room air        I&O's Summary    07 Dec 2023 07:01  -  08 Dec 2023 07:00  --------------------------------------------------------  IN: 1500 mL / OUT: 860 mL / NET: 640 mL    08 Dec 2023 07:01  -  08 Dec 2023 13:08  --------------------------------------------------------  IN: 150 mL / OUT: 30 mL / NET: 120 mL        GENERAL: NAD, well-groomed  HEAD:  S/p R crani. Dressing clean, dry, intact. Dried blood noted, not fully saturated.  DRAINS: 10 flat IZABEL x 1; bloody drainage noted. Serosanguinous drainage noted.  WOUND: Dressing clean dry intact  JOEY COMA SCORE: E-4 V-4 M-6 = 14  MENTAL STATUS: AAO x1(name, says at home), says last name, minimally conversant. following simple commands on R, thumbs up, opening eyes, following most EOM.  MOTOR: strength 4/5 RUE. RLE spontaneously bending at knee. LUE/LLE trace WD.    SENSATION: dec L sided sensation. grossly intact on R sided    LABS:                           10.4   9.62  )-----------( 225      ( 08 Dec 2023 04:44 )             31.3       12-08    139  |  105  |  23.2<H>  ----------------------------<  111<H>  5.0   |  23.0  |  1.02    Ca    9.1      08 Dec 2023 11:15  Phos  4.1     12-08  Mg     1.7     12-08        PT/INR - ( 07 Dec 2023 05:35 )   PT: 12.2 sec;   INR: 1.10 ratio         PTT - ( 07 Dec 2023 05:35 )  PTT:29.4 sec        Imaging:      ASSESSMENT & PLAN:  Plan  - Q4 neuro checks  - CT 12/9 AM  - Pain control PRN; avoid oversedation. Tylenol Oxy 5/10   - Keppra 500 BID  - HOB 30 degrees   - Normotension  - soft bite sized w/ mod thick liquids   - Resume PO meds: Amantadine 150 QAM, Memantine 5 BID, Provigil 100, Lipitor 80, FA, Melatonin, Nephro-roma  - Miralax, Senna  - 10 flat IZABEL x 1; record output   - Ancef while drain in  - Post op CTH- post operative changes  - PT/OT  - b/l SCDs; hold AC/AP/chemical DVT prophylaxis at this time until 12/9am CTH  - AM labs  - Wound: May remove dressing on POD#3, 12/10. May shower on POD#5, using gentle shampoo, pat dry, leave open to air.  - Plastics following   - Medical management/supportive care per Neurosurgery  - D/w Dr. Larios NSCU Transfer Note    Transfer from: NSCU    Transfer to: (  ) Medicine    (  ) Telemetry    (  ) RCU   ( x ) Neurosurgery                               (  ) Palliative    (  ) Stroke Unit    (  ) MICU    (  ) __________________    Accepting Physician: Dr. Larios    Signout given to: NSGY team    HPI / NSCU COURSE:    83M w/ PMH HTN, CAD s/p stents on ASA/Plavix, RCC s/p L nephrectomy, bladder CA, BPH, CHF, LBBB, vertigo, HLD, 5.5 cm AAA without rupture post EVAR, paroxysmal afib on Eliquis, early stage Alzheimer's dementia, presented to [facility] 11/10/23 s/p unwitnessed fall with head strike at home found by wife, found with multicompartmental ICH, s/p R craniectomy 11/10/23, course significant for Klebsiella pneumonia, hypernatremia, lethargy, LUE DVT, new anterior falcine SDH, downgraded to SDU 11/28  s/p R cranioplasty POD#1  post op CTH ordered for stable w/ postoperative changes, exam stable. Requiring R wrist restraint for reaching for wound. SG IZABEL in place.        Vital Signs Last 24 Hrs  T(C): 36.5 (08 Dec 2023 11:51), Max: 36.9 (07 Dec 2023 15:09)  T(F): 97.7 (08 Dec 2023 11:51), Max: 98.4 (07 Dec 2023 15:09)  HR: 119 (08 Dec 2023 12:00) (80 - 123)  BP: 144/87 (08 Dec 2023 12:00) (95/64 - 160/89)  BP(mean): 104 (08 Dec 2023 12:00) (69 - 112)  RR: 20 (08 Dec 2023 12:00) (9 - 24)  SpO2: 98% (08 Dec 2023 12:00) (95% - 100%)    Parameters below as of 07 Dec 2023 19:55  Patient On (Oxygen Delivery Method): room air        I&O's Summary    07 Dec 2023 07:01  -  08 Dec 2023 07:00  --------------------------------------------------------  IN: 1500 mL / OUT: 860 mL / NET: 640 mL    08 Dec 2023 07:01  -  08 Dec 2023 13:08  --------------------------------------------------------  IN: 150 mL / OUT: 30 mL / NET: 120 mL        GENERAL: NAD, well-groomed  HEAD:  S/p R crani. Dressing clean, dry, intact. Dried blood noted, not fully saturated.  DRAINS: 10 flat IZABEL x 1; bloody drainage noted. Serosanguinous drainage noted.  WOUND: Dressing clean dry intact  JOEY COMA SCORE: E-4 V-4 M-6 = 14  MENTAL STATUS: AAO x1(name, says at home), says last name, minimally conversant. following simple commands on R, thumbs up, opening eyes, following most EOM.  MOTOR: strength 4/5 RUE. RLE spontaneously bending at knee. LUE/LLE trace WD.    SENSATION: dec L sided sensation. grossly intact on R sided    LABS:                           10.4   9.62  )-----------( 225      ( 08 Dec 2023 04:44 )             31.3       12-08    139  |  105  |  23.2<H>  ----------------------------<  111<H>  5.0   |  23.0  |  1.02    Ca    9.1      08 Dec 2023 11:15  Phos  4.1     12-08  Mg     1.7     12-08        PT/INR - ( 07 Dec 2023 05:35 )   PT: 12.2 sec;   INR: 1.10 ratio         PTT - ( 07 Dec 2023 05:35 )  PTT:29.4 sec        Imaging:      ASSESSMENT & PLAN:  Plan  - Q4 neuro checks  - CT 12/9 AM  - Pain control PRN; avoid oversedation. Tylenol Oxy 5/10   - Keppra 500 BID  - HOB 30 degrees   - Normotension  - soft bite sized w/ mod thick liquids   - Resume PO meds: Amantadine 150 QAM, Memantine 5 BID, Provigil 100, Lipitor 80, FA, Melatonin, Nephro-roma  - Miralax, Senna  - 10 flat IZABEL x 1; record output   - Ancef while drain in  - Post op CTH- post operative changes  - PT/OT  - b/l SCDs; hold AC/AP/chemical DVT prophylaxis at this time until 12/9am CTH  - AM labs  - Wound: May remove dressing on POD#3, 12/10. May shower on POD#5, using gentle shampoo, pat dry, leave open to air.  - Plastics following   - Medical management/supportive care per Neurosurgery  - D/w Dr. Larios    ---------------------------  ATTENDING ATTESTATION.    82y M with recent TBI - R frontal IPH, SDH, tSAH; s/p R decompressive hemicraniectomy 11/10.  Now s/p cranioplasty 12/7.  Encephalopathy due to TBI, resp. infection, resp. distress, delirium.  PMH of CAD s/p stents, CHF; HTN, HLD. AAA post EVAR. Parox. A.Fib, on Cardizem. Not on AC now in view of TBI.  PMH of renal cell carcinoma status post left nephrectomy, bladder CA, BPH.   AV vegetation on TTE - unclear etiology. Several BCx negative.  H/o HIT. LUE DVT.    Plan:  neurochecks, protected sleep time  cont Diltiazem and Metoprolol for rate control and HTN,   maintain -150, HR<120  hold anti-thrombotics for now as fresh postop  diet as tolerated, cont BM regimen  monitor e-lytes and UOP  SCDs    Time spent 35 min, non-critical, included review of relevant history, clinical examination, review of data and images, discussion of treatment with the multidisciplinary team and any consultants involved in this patient’s care as well as family discussion. NSCU Transfer Note    Transfer from: NSCU    Transfer to: (  ) Medicine    (  ) Telemetry    (  ) RCU   ( x ) Neurosurgery                               (  ) Palliative    (  ) Stroke Unit    (  ) MICU    (  ) __________________    Accepting Physician: Dr. Larios    Signout given to: NSGY team    HPI / NSCU COURSE:    83M w/ PMH HTN, CAD s/p stents on ASA/Plavix, RCC s/p L nephrectomy, bladder CA, BPH, CHF, LBBB, vertigo, HLD, 5.5 cm AAA without rupture post EVAR, paroxysmal afib on Eliquis, early stage Alzheimer's dementia, presented to [facility] 11/10/23 s/p unwitnessed fall with head strike at home found by wife, found with multicompartmental ICH, s/p R craniectomy 11/10/23, course significant for Klebsiella pneumonia, hypernatremia, lethargy, LUE DVT, new anterior falcine SDH, downgraded to SDU 11/28  s/p R cranioplasty POD#1  post op CTH ordered for stable w/ postoperative changes, exam stable. Requiring R wrist restraint for reaching for wound. SG IZABEL in place.        Vital Signs Last 24 Hrs  T(C): 36.5 (08 Dec 2023 11:51), Max: 36.9 (07 Dec 2023 15:09)  T(F): 97.7 (08 Dec 2023 11:51), Max: 98.4 (07 Dec 2023 15:09)  HR: 119 (08 Dec 2023 12:00) (80 - 123)  BP: 144/87 (08 Dec 2023 12:00) (95/64 - 160/89)  BP(mean): 104 (08 Dec 2023 12:00) (69 - 112)  RR: 20 (08 Dec 2023 12:00) (9 - 24)  SpO2: 98% (08 Dec 2023 12:00) (95% - 100%)    Parameters below as of 07 Dec 2023 19:55  Patient On (Oxygen Delivery Method): room air        I&O's Summary    07 Dec 2023 07:01  -  08 Dec 2023 07:00  --------------------------------------------------------  IN: 1500 mL / OUT: 860 mL / NET: 640 mL    08 Dec 2023 07:01  -  08 Dec 2023 13:08  --------------------------------------------------------  IN: 150 mL / OUT: 30 mL / NET: 120 mL        GENERAL: NAD, well-groomed  HEAD:  S/p R crani. Dressing clean, dry, intact. Dried blood noted, not fully saturated.  DRAINS: 10 flat IZABEL x 1; bloody drainage noted. Serosanguinous drainage noted.  WOUND: Dressing clean dry intact  JOEY COMA SCORE: E-4 V-4 M-6 = 14  MENTAL STATUS: AAO x1(name, says at home), says last name, minimally conversant. following simple commands on R, thumbs up, opening eyes, following most EOM.  MOTOR: strength 4/5 RUE. RLE spontaneously bending at knee. LUE/LLE trace WD.    SENSATION: dec L sided sensation. grossly intact on R sided    LABS:                           10.4   9.62  )-----------( 225      ( 08 Dec 2023 04:44 )             31.3       12-08    139  |  105  |  23.2<H>  ----------------------------<  111<H>  5.0   |  23.0  |  1.02    Ca    9.1      08 Dec 2023 11:15  Phos  4.1     12-08  Mg     1.7     12-08        PT/INR - ( 07 Dec 2023 05:35 )   PT: 12.2 sec;   INR: 1.10 ratio         PTT - ( 07 Dec 2023 05:35 )  PTT:29.4 sec        Imaging:      ASSESSMENT & PLAN:  Plan  - Q4 neuro checks  - CT 12/9 AM  - Pain control PRN; avoid oversedation. Tylenol Oxy 5/10   - Keppra 500 BID  - HOB 30 degrees   - Normotension  - soft bite sized w/ mod thick liquids   - Resume PO meds: Amantadine 150 QAM, Memantine 5 BID, Provigil 100, Lipitor 80, FA, Melatonin, Nephro-roma  - Miralax, Senna  - 10 flat IZABEL x 1; record output   - Ancef while drain in  - Post op CTH- post operative changes  - PT/OT  - b/l SCDs; hold AC/AP/chemical DVT prophylaxis at this time until 12/9am CTH  - AM labs  - Wound: May remove dressing on POD#3, 12/10. May shower on POD#5, using gentle shampoo, pat dry, leave open to air.  - Plastics following   - Medical management/supportive care per Neurosurgery  - D/w Dr. Larios    ---------------------------  ATTENDING ATTESTATION.    82y M with recent TBI - R frontal IPH, SDH, tSAH; s/p R decompressive hemicraniectomy 11/10.  Now s/p cranioplasty 12/7.  Encephalopathy due to TBI, resp. infection, resp. distress, delirium.  PMH of CAD s/p stents, CHF; HTN, HLD. AAA post EVAR. Parox. A.Fib, on Cardizem. Not on AC now in view of TBI.  PMH of renal cell carcinoma status post left nephrectomy, bladder CA, BPH.   AV vegetation on TTE - unclear etiology. Several BCx negative.  H/o HIT. LUE DVT.    Plan:  neurochecks, protected sleep time  cont Diltiazem and Metoprolol for rate control and HTN,   maintain -150, HR<120  hold anti-thrombotics for now as fresh postop  diet as tolerated, cont BM regimen  monitor e-lytes and UOP  SCDs    Time spent 35 min, non-critical, included review of relevant history, clinical examination, review of data and images, discussion of treatment with the multidisciplinary team and any consultants involved in this patient’s care as well as family discussion. NSCU Transfer Note    Transfer from: NSCU    Transfer to: (  ) Medicine    (  ) Telemetry    (  ) RCU   ( x ) Neurosurgery                               (  ) Palliative    (  ) Stroke Unit    (  ) MICU    (  ) __________________    Accepting Physician: Dr. Larios    Signout given to: NSGY team    HPI / NSCU COURSE:    83M w/ PMH HTN, CAD s/p stents on ASA/Plavix, RCC s/p L nephrectomy, bladder CA, BPH, CHF, LBBB, vertigo, HLD, 5.5 cm AAA without rupture post EVAR, paroxysmal afib on Eliquis, early stage Alzheimer's dementia, presented to [facility] 11/10/23 s/p unwitnessed fall with head strike at home found by wife, found with multicompartmental ICH, s/p R craniectomy 11/10/23, course significant for Klebsiella pneumonia, hypernatremia, lethargy, LUE DVT, new anterior falcine SDH, downgraded to SDU 11/28  s/p R cranioplasty POD#1  post op CTH ordered for stable w/ postoperative changes, exam stable. Requiring R wrist restraint for reaching for wound. SG IZABEL in place.        Vital Signs Last 24 Hrs  T(C): 36.5 (08 Dec 2023 11:51), Max: 36.9 (07 Dec 2023 15:09)  T(F): 97.7 (08 Dec 2023 11:51), Max: 98.4 (07 Dec 2023 15:09)  HR: 119 (08 Dec 2023 12:00) (80 - 123)  BP: 144/87 (08 Dec 2023 12:00) (95/64 - 160/89)  BP(mean): 104 (08 Dec 2023 12:00) (69 - 112)  RR: 20 (08 Dec 2023 12:00) (9 - 24)  SpO2: 98% (08 Dec 2023 12:00) (95% - 100%)    Parameters below as of 07 Dec 2023 19:55  Patient On (Oxygen Delivery Method): room air        I&O's Summary    07 Dec 2023 07:01  -  08 Dec 2023 07:00  --------------------------------------------------------  IN: 1500 mL / OUT: 860 mL / NET: 640 mL    08 Dec 2023 07:01  -  08 Dec 2023 13:08  --------------------------------------------------------  IN: 150 mL / OUT: 30 mL / NET: 120 mL        GENERAL: NAD, well-groomed  HEAD:  S/p R crani. Dressing clean, dry, intact. Dried blood noted, not fully saturated.  DRAINS: 10 flat IZABEL x 1; bloody drainage noted. Serosanguinous drainage noted.  WOUND: Dressing clean dry intact  JOEY COMA SCORE: E-4 V-4 M-6 = 14  MENTAL STATUS: AAO x1(name, says at home), says last name, minimally conversant. following simple commands on R, thumbs up, opening eyes, following most EOM.  MOTOR: strength 4/5 RUE. RLE spontaneously bending at knee. LUE/LLE trace WD.    SENSATION: dec L sided sensation. grossly intact on R sided    LABS:                           10.4   9.62  )-----------( 225      ( 08 Dec 2023 04:44 )             31.3       12-08    139  |  105  |  23.2<H>  ----------------------------<  111<H>  5.0   |  23.0  |  1.02    Ca    9.1      08 Dec 2023 11:15  Phos  4.1     12-08  Mg     1.7     12-08        PT/INR - ( 07 Dec 2023 05:35 )   PT: 12.2 sec;   INR: 1.10 ratio         PTT - ( 07 Dec 2023 05:35 )  PTT:29.4 sec        Imaging:      ASSESSMENT & PLAN:  Plan  - Q4 neuro checks  - CT 12/9 AM  - Pain control PRN; avoid oversedation. Tylenol Oxy 5/10   - Keppra 500 BID  - HOB 30 degrees   - Normotension  - soft bite sized w/ mod thick liquids   - Resume PO meds: Amantadine 150 QAM, Memantine 5 BID, Provigil 100, Lipitor 80, FA, Melatonin, Nephro-roma  - Miralax, Senna  - 10 flat IZABEL x 1; record output   - Ancef while drain in  - Post op CTH- post operative changes  - PT/OT  - b/l SCDs; hold AC/AP/chemical DVT prophylaxis at this time until 12/9am CTH  - AM labs  - Wound: May remove dressing on POD#3, 12/10. May shower on POD#5, using gentle shampoo, pat dry, leave open to air.  - Plastics following   - Medical management/supportive care per Neurosurgery  - D/w Dr. Larios    ---------------------------  ATTENDING ATTESTATION.    82y M with recent TBI - R frontal IPH, SDH, tSAH; s/p R decompressive hemicraniectomy 11/10.  Now s/p cranioplasty 12/7.  Encephalopathy due to TBI, resp. infection, resp. distress, delirium.  PMH of CAD s/p stents, CHF; HTN, HLD. AAA post EVAR. Parox. A.Fib, on Cardizem. Not on AC now in view of TBI.  PMH of renal cell carcinoma status post left nephrectomy, bladder CA, BPH.   AV vegetation on TTE - unclear etiology. Several BCx negative.  H/o HIT. LUE DVT.    Plan:  neurochecks, protected sleep time  cont Diltiazem and Metoprolol for rate control and HTN,   maintain -150, HR<120  hold anti-thrombotics for now as fresh postop  diet as tolerated, cont BM regimen  monitor e-lytes and UOP  SCDs, if cleared for ppx AC - Fondaparinux in view of h/o HIT    Time spent 35 min, non-critical, included review of relevant history, clinical examination, review of data and images, discussion of treatment with the multidisciplinary team and any consultants involved in this patient’s care as well as family discussion. NSCU Transfer Note    Transfer from: NSCU    Transfer to: (  ) Medicine    (  ) Telemetry    (  ) RCU   ( x ) Neurosurgery                               (  ) Palliative    (  ) Stroke Unit    (  ) MICU    (  ) __________________    Accepting Physician: Dr. Larios    Signout given to: NSGY team    HPI / NSCU COURSE:    83M w/ PMH HTN, CAD s/p stents on ASA/Plavix, RCC s/p L nephrectomy, bladder CA, BPH, CHF, LBBB, vertigo, HLD, 5.5 cm AAA without rupture post EVAR, paroxysmal afib on Eliquis, early stage Alzheimer's dementia, presented to [facility] 11/10/23 s/p unwitnessed fall with head strike at home found by wife, found with multicompartmental ICH, s/p R craniectomy 11/10/23, course significant for Klebsiella pneumonia, hypernatremia, lethargy, LUE DVT, new anterior falcine SDH, downgraded to SDU 11/28  s/p R cranioplasty POD#1  post op CTH ordered for stable w/ postoperative changes, exam stable. Requiring R wrist restraint for reaching for wound. SG IZABEL in place.        Vital Signs Last 24 Hrs  T(C): 36.5 (08 Dec 2023 11:51), Max: 36.9 (07 Dec 2023 15:09)  T(F): 97.7 (08 Dec 2023 11:51), Max: 98.4 (07 Dec 2023 15:09)  HR: 119 (08 Dec 2023 12:00) (80 - 123)  BP: 144/87 (08 Dec 2023 12:00) (95/64 - 160/89)  BP(mean): 104 (08 Dec 2023 12:00) (69 - 112)  RR: 20 (08 Dec 2023 12:00) (9 - 24)  SpO2: 98% (08 Dec 2023 12:00) (95% - 100%)    Parameters below as of 07 Dec 2023 19:55  Patient On (Oxygen Delivery Method): room air        I&O's Summary    07 Dec 2023 07:01  -  08 Dec 2023 07:00  --------------------------------------------------------  IN: 1500 mL / OUT: 860 mL / NET: 640 mL    08 Dec 2023 07:01  -  08 Dec 2023 13:08  --------------------------------------------------------  IN: 150 mL / OUT: 30 mL / NET: 120 mL        GENERAL: NAD, well-groomed  HEAD:  S/p R crani. Dressing clean, dry, intact. Dried blood noted, not fully saturated.  DRAINS: 10 flat IZABEL x 1; bloody drainage noted. Serosanguinous drainage noted.  WOUND: Dressing clean dry intact  JOEY COMA SCORE: E-4 V-4 M-6 = 14  MENTAL STATUS: AAO x1(name, says at home), says last name, minimally conversant. following simple commands on R, thumbs up, opening eyes, following most EOM.  MOTOR: strength 4/5 RUE. RLE spontaneously bending at knee. LUE/LLE trace WD.    SENSATION: dec L sided sensation. grossly intact on R sided    LABS:                           10.4   9.62  )-----------( 225      ( 08 Dec 2023 04:44 )             31.3       12-08    139  |  105  |  23.2<H>  ----------------------------<  111<H>  5.0   |  23.0  |  1.02    Ca    9.1      08 Dec 2023 11:15  Phos  4.1     12-08  Mg     1.7     12-08        PT/INR - ( 07 Dec 2023 05:35 )   PT: 12.2 sec;   INR: 1.10 ratio         PTT - ( 07 Dec 2023 05:35 )  PTT:29.4 sec        Imaging:      ASSESSMENT & PLAN:  Plan  - Q4 neuro checks  - CT 12/9 AM  - Pain control PRN; avoid oversedation. Tylenol Oxy 5/10   - Keppra 500 BID  - HOB 30 degrees   - Normotension  - soft bite sized w/ mod thick liquids   - Resume PO meds: Amantadine 150 QAM, Memantine 5 BID, Provigil 100, Lipitor 80, FA, Melatonin, Nephro-roma  - Miralax, Senna  - 10 flat IZABEL x 1; record output   - Ancef while drain in  - Post op CTH- post operative changes  - PT/OT  - b/l SCDs; hold AC/AP/chemical DVT prophylaxis at this time until 12/9am CTH  - AM labs  - Wound: May remove dressing on POD#3, 12/10. May shower on POD#5, using gentle shampoo, pat dry, leave open to air.  - Plastics following   - Medical management/supportive care per Neurosurgery  - D/w Dr. Larios    ---------------------------  ATTENDING ATTESTATION.    82y M with recent TBI - R frontal IPH, SDH, tSAH; s/p R decompressive hemicraniectomy 11/10.  Now s/p cranioplasty 12/7.  Encephalopathy due to TBI, resp. infection, resp. distress, delirium.  PMH of CAD s/p stents, CHF; HTN, HLD. AAA post EVAR. Parox. A.Fib, on Cardizem. Not on AC now in view of TBI.  PMH of renal cell carcinoma status post left nephrectomy, bladder CA, BPH.   AV vegetation on TTE - unclear etiology. Several BCx negative.  H/o HIT. LUE DVT.    Plan:  neurochecks, protected sleep time  cont Diltiazem and Metoprolol for rate control and HTN,   maintain -150, HR<120  hold anti-thrombotics for now as fresh postop  diet as tolerated, cont BM regimen  monitor e-lytes and UOP  SCDs, if cleared for ppx AC - Fondaparinux in view of h/o HIT    Time spent 35 min, non-critical, included review of relevant history, clinical examination, review of data and images, discussion of treatment with the multidisciplinary team and any consultants involved in this patient’s care as well as family discussion.

## 2023-12-08 NOTE — OCCUPATIONAL THERAPY INITIAL EVALUATION ADULT - IMPAIRMENTS CONTRIBUTING IMPAIRED BED MOBILITY, REHAB EVAL
impaired balance/cognition/decreased strength
impaired balance/impaired motor control/decreased strength

## 2023-12-08 NOTE — OCCUPATIONAL THERAPY INITIAL EVALUATION ADULT - LEVEL OF INDEPENDENCE: SIT/SUPINE, REHAB EVAL
dependent (less than 25% patients effort)
pt received resting in bed
moderate assist (50% patients effort)

## 2023-12-08 NOTE — OCCUPATIONAL THERAPY INITIAL EVALUATION ADULT - LEVEL OF INDEPENDENCE: DRESS LOWER BODY, OT EVAL
dependent (less than 25% patients effort)
dependent (less than 25% patients effort)
to don socks/dependent (less than 25% patients effort)

## 2023-12-08 NOTE — OCCUPATIONAL THERAPY INITIAL EVALUATION ADULT - LEVEL OF INDEPENDENCE: BED TO CHAIR, REHAB EVAL
dependent (less than 25% patients effort)
maximum assist (25% patients effort)
TBA/unable to perform

## 2023-12-08 NOTE — OCCUPATIONAL THERAPY INITIAL EVALUATION ADULT - PRECAUTIONS/LIMITATIONS, REHAB EVAL
+ helmet OOB/fall precautions
aspiration precautions/fall precautions/seizure precautions
cardiac precautions/fall precautions
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
## 2023-12-08 NOTE — PROGRESS NOTE ADULT - ATTENDING COMMENTS
Patient seen and examined, discussed patient with Dr. Tovar and agree with recommendations.    Rehab/Impaired mobility and function - Patient continues to require hospitalization for the above diagnoses and ongoing active management of comorbid complications (ICU level care, IV HTN medications) that are substantially impairing functional ability and impairing quality of life necessitating ongoing medical management of these complications.     Patient more alert and conversive. Continues to have significant left neglect as well as hemiplegia which is expected to impart significant barriers to functional progress.     Will continue to follow. Rehab recommendations are dependent on how functional progress changes as well as how patient continues to participate and tolerate therapeutic interventions, which may change. Recommend ongoing mobilization by staff to maintain cardiopulmonary function and prevention of secondary complications related to debility. Discussed the specific management and recommendations above with rehab clinical care team/rehab liaison.

## 2023-12-08 NOTE — PHYSICAL THERAPY INITIAL EVALUATION ADULT - PERTINENT HX OF CURRENT PROBLEM, REHAB EVAL
83M w/ PMH HTN, CAD s/p stents on ASA/Plavix, RCC s/p L nephrectomy, bladder CA, BPH, CHF, LBBB, vertigo, HLD, 5.5 cm AAA without rupture post EVAR, paroxysmal afib on Eliquis, early stage Alzheimer's dementia, presented to [facility] 11/10/23 s/p unwitnessed fall with head strike at home found by wife, found with multicompartmental ICH, s/p R craniectomy 11/10/23, course significant for Klebsiella pneumonia, hypernatremia, lethargy, LUE DVT, new anterior falcine SDH, downgraded to SDU 11/28  s/p R cranioplasty 83M w/ PMH HTN, CAD s/p stents on ASA/Plavix, RCC s/p L nephrectomy, bladder CA, BPH, CHF, LBBB, vertigo, HLD, 5.5 cm AAA without rupture post EVAR, paroxysmal afib on Eliquis, early stage Alzheimer's dementia, presented to [facility] 11/10/23 s/p unwitnessed fall with head strike at home found by wife, found with multicompartmental ICH, s/p R craniectomy 11/10/23, course significant for Klebsiella pneumonia, hypernatremia, lethargy, LUE DVT, new anterior falcine SDH, downgraded to SDU 11/28  s/p R cranioplasty

## 2023-12-08 NOTE — OCCUPATIONAL THERAPY INITIAL EVALUATION ADULT - LEVEL OF INDEPENDENCE: SUPINE/SIT, REHAB EVAL
moderate assist (50% patients effort)
dependent (less than 25% patients effort)
maximum assist (25% patients effort)

## 2023-12-08 NOTE — OCCUPATIONAL THERAPY INITIAL EVALUATION ADULT - ADDITIONAL COMMENTS
as per chart:  lives in basement apartment, [address], walk in shower, no DME. Pt is right handed
Findings limited secondary to cognitive impairments. Wife was at the bedside and able to assist with information regarding social history.  Pt lives in a basement apartment, [address], with all needs accessible on the first level.   Bathroom is a walk-in shower.   Pt does not own any DME.  Pt is right handed.
Pt has walk-in shower   Pt does not own any DME  Pt is right handed

## 2023-12-08 NOTE — OCCUPATIONAL THERAPY INITIAL EVALUATION ADULT - LEVEL OF INDEPENDENCE: DRESS UPPER BODY, OT EVAL
dependent (less than 25% patients effort)
dependent (less than 25% patients effort)
to don gown/dependent (less than 25% patients effort)

## 2023-12-08 NOTE — PROGRESS NOTE ADULT - ASSESSMENT
83M w/ PMH HTN, CAD s/p stents on ASA/Plavix, RCC s/p L nephrectomy, bladder CA, BPH, CHF, LBBB, vertigo, HLD, 5.5 cm AAA without rupture post EVAR, paroxysmal afib on Eliquis, early stage Alzheimer's dementia, presented to [facility] 11/10/23 s/p unwitnessed fall with head strike at home found by wife, found with multicompartmental ICH, s/p R craniectomy 11/10/23, course significant for Klebsiella pneumonia, hypernatremia, lethargy, LUE DVT, new anterior falcine SDH, downgraded to SDU 11/28  s/p R cranioplasty POD#1      Plan  - Q1 neuro checks  - Pain control PRN; avoid oversedation. Tylenol Oxy 5/10   - Keppra 500 BID  - HOB 30 degrees   - Normotension  - Advance diet as tolerated- soft bite sized w/ mod thick liquids   - Resume PO meds when tolerating; Amantadine 150 QAM, Memantine 5 BID, Provigil 100, Lipitor 80, FA, Melatonin, Nephro-roma  - Record BMs; Miralax, Senna  - 10 flat IZABEL x 1; record output   - Ancef while drain in  - Post op CTH 6 hours (1AM)  - PT/OT  - b/l SCDs; hold AC/AP/chemical DVT prophylaxis at this time  - AM labs  - Wound: May remove dressing on POD#3, 12/10. May shower on POD#5, using gentle shampoo, pat dry, leave open to air.  - Plastics following   - Medical management/supportive care per NSICU  - D/w Dr. Larios 83M w/ PMH HTN, CAD s/p stents on ASA/Plavix, RCC s/p L nephrectomy, bladder CA, BPH, CHF, LBBB, vertigo, HLD, 5.5 cm AAA without rupture post EVAR, paroxysmal afib on Eliquis, early stage Alzheimer's dementia, presented to [facility] 11/10/23 s/p unwitnessed fall with head strike at home found by wife, found with multicompartmental ICH, s/p R craniectomy 11/10/23, course significant for Klebsiella pneumonia, hypernatremia, lethargy, LUE DVT, new anterior falcine SDH, downgraded to SDU 11/28  s/p R cranioplasty POD#1      Plan  - Q1 neuro checks  - Pain control PRN; avoid oversedation. Tylenol Oxy 5/10   - Keppra 500 BID  - HOB 30 degrees   - Normotension  - Advance diet as tolerated- soft bite sized w/ mod thick liquids   - Resume PO meds when tolerating; Amantadine 150 QAM, Memantine 5 BID, Provigil 100, Lipitor 80, FA, Melatonin, Nephro-roma  - Record BMs; Miralax, Senna  - 10 flat IZABEL x 1; record output   - Ancef while drain in  - Post op CTH 6 hours (1AM)  - PT/OT  - b/l SCDs; hold AC/AP/chemical DVT prophylaxis at this time  - AM labs  - Wound: May remove dressing on POD#3, 12/10. May shower on POD#5, using gentle shampoo, pat dry, leave open to air.  - Plastics following   - Medical management/supportive care per NSICU  - D/w Dr. Lairos

## 2023-12-08 NOTE — PROGRESS NOTE ADULT - SUBJECTIVE AND OBJECTIVE BOX
INTERVAL HPI/ACUTE EVENTS:  83M w/ PMH HTN, CAD s/p stents on ASA/Plavix, RCC s/p L nephrectomy, bladder CA, BPH, CHF, LBBB, vertigo, HLD, 5.5 cm AAA without rupture post EVAR, paroxysmal afib on Eliquis, early stage Alzheimer's dementia, presented to [facility] 11/10/23 s/p unwitnessed fall with head strike at home found by wife, found with multicompartmental ICH, s/p R craniectomy 11/10/23, course significant for Klebsiella pneumonia, hypernatremia, lethargy, LUE DVT, new anterior falcine SDH, downgraded to SDU 11/28  s/p R cranioplasty POD#1  Pending post op CTH ordered for 1AM, exam stable. Family bedside, updated on plan. Requiring R wrist restraint for reaching for wound.     Vital Signs Last 24 Hrs  T(C): 36.7 (07 Dec 2023 23:34), Max: 37 (07 Dec 2023 04:00)  T(F): 98.1 (07 Dec 2023 23:34), Max: 98.6 (07 Dec 2023 04:00)  HR: 90 (08 Dec 2023 00:00) (73 - 107)  BP: 145/83 (08 Dec 2023 00:00) (95/64 - 151/100)  BP(mean): 102 (08 Dec 2023 00:00) (69 - 112)  RR: 18 (08 Dec 2023 00:00) (11 - 20)  SpO2: 99% (08 Dec 2023 00:00) (95% - 99%)  Parameters below as of 07 Dec 2023 19:55  Patient On (Oxygen Delivery Method): room air    PHYSICAL EXAM:  GENERAL: NAD, well-groomed  HEAD:  S/p R crani. Dressing clean, dry, intact. Dried blood noted, not fully saturated.  DRAINS: 10 flat IZABEL x 1; bloody drainage noted. Serosanguinous drainage noted.  WOUND: Dressing clean dry intact  JOEY COMA SCORE: E-4 V-4 M-6 = 14  MENTAL STATUS: AAO x1(name, says at home), says last name, minimally conversant. following simple commands on R, thumbs up, opening eyes, following most EOM.  CRANIAL NERVES: PERRL. Tracking examiner. Gaze midline  MOTOR: strength 4/5 RUE. RLE spontaneously bending at knee. LUE/LLE trace WD.    SENSATION: dec L sided sensation. grossly intact on R sided  SKIN: Warm, dry    LABS:             9.5    8.31  )-----------( 198      ( 07 Dec 2023 05:35 )             28.2     12-07    134<L>  |  100  |  22.9<H>  ----------------------------<  104<H>  4.1   |  24.0  |  0.94    Ca    8.9      07 Dec 2023 05:35  Phos  2.9     12-07  Mg     1.7     12-07      PT/INR - ( 07 Dec 2023 05:35 )   PT: 12.2 sec;   INR: 1.10 ratio      PTT - ( 07 Dec 2023 05:35 )  PTT:29.4 sec  Urinalysis Basic - ( 07 Dec 2023 05:35 )    Color: x / Appearance: x / SG: x / pH: x  Gluc: 104 mg/dL / Ketone: x  / Bili: x / Urobili: x   Blood: x / Protein: x / Nitrite: x   Leuk Esterase: x / RBC: x / WBC x   Sq Epi: x / Non Sq Epi: x / Bacteria: x    12-06 @ 07:01  -  12-07 @ 07:00  --------------------------------------------------------  IN: 0 mL / OUT: 1800 mL / NET: -1800 mL    12-07 @ 07:01  -  12-08 @ 00:09  --------------------------------------------------------  IN: 150 mL / OUT: 0 mL / NET: 150 mL      RADIOLOGY & ADDITIONAL TESTS:  Post op CTH ordered 1AM INTERVAL HPI/ACUTE EVENTS:  83M w/ PMH HTN, CAD s/p stents on ASA/Plavix, RCC s/p L nephrectomy, bladder CA, BPH, CHF, LBBB, vertigo, HLD, 5.5 cm AAA without rupture post EVAR, paroxysmal afib on Eliquis, early stage Alzheimer's dementia, presented to [facility] 11/10/23 s/p unwitnessed fall with head strike at home found by wife, found with multicompartmental ICH, s/p R craniectomy 11/10/23, course significant for Klebsiella pneumonia, hypernatremia, lethargy, LUE DVT, new anterior falcine SDH, downgraded to SDU 11/28  s/p R cranioplasty POD#1  Pending post op CTH ordered for 1AM, exam stable. Family bedside, updated on plan. Requiring R wrist restraint for reaching for wound.     Vital Signs Last 24 Hrs  T(C): 36.7 (07 Dec 2023 23:34), Max: 37 (07 Dec 2023 04:00)  T(F): 98.1 (07 Dec 2023 23:34), Max: 98.6 (07 Dec 2023 04:00)  HR: 90 (08 Dec 2023 00:00) (73 - 107)  BP: 145/83 (08 Dec 2023 00:00) (95/64 - 151/100)  BP(mean): 102 (08 Dec 2023 00:00) (69 - 112)  RR: 18 (08 Dec 2023 00:00) (11 - 20)  SpO2: 99% (08 Dec 2023 00:00) (95% - 99%)  Parameters below as of 07 Dec 2023 19:55  Patient On (Oxygen Delivery Method): room air    PHYSICAL EXAM:  GENERAL: NAD, well-groomed  HEAD:  S/p R crani. Dressing clean, dry, intact. Dried blood noted, not fully saturated.  DRAINS: 10 flat IZABEL x 1; bloody drainage noted. Serosanguinous drainage noted.  WOUND: Dressing clean dry intact  JOEY COMA SCORE: E-4 V-4 M-6 = 14  MENTAL STATUS: AAO x1(name, says at home), says last name, minimally conversant. following simple commands on R, thumbs up, opening eyes, following most EOM.  CRANIAL NERVES: PERRL. Tracking examiner. Gaze midline  MOTOR: strength 4/5 RUE. RLE spontaneously bending at knee. LUE/LLE trace WD.    SENSATION: dec L sided sensation. grossly intact on R sided  SKIN: Warm, dry    LABS:             9.5    8.31  )-----------( 198      ( 07 Dec 2023 05:35 )             28.2     12-07    134<L>  |  100  |  22.9<H>  ----------------------------<  104<H>  4.1   |  24.0  |  0.94    Ca    8.9      07 Dec 2023 05:35  Phos  2.9     12-07  Mg     1.7     12-07      PT/INR - ( 07 Dec 2023 05:35 )   PT: 12.2 sec;   INR: 1.10 ratio      PTT - ( 07 Dec 2023 05:35 )  PTT:29.4 sec  Urinalysis Basic - ( 07 Dec 2023 05:35 )    Color: x / Appearance: x / SG: x / pH: x  Gluc: 104 mg/dL / Ketone: x  / Bili: x / Urobili: x   Blood: x / Protein: x / Nitrite: x   Leuk Esterase: x / RBC: x / WBC x   Sq Epi: x / Non Sq Epi: x / Bacteria: x    12-06 @ 07:01  -  12-07 @ 07:00  --------------------------------------------------------  IN: 0 mL / OUT: 1800 mL / NET: -1800 mL    12-07 @ 07:01  -  12-08 @ 00:09  --------------------------------------------------------  IN: 150 mL / OUT: 0 mL / NET: 150 mL      RADIOLOGY & ADDITIONAL TESTS:  Post op CTH ordered 1AM

## 2023-12-08 NOTE — OCCUPATIONAL THERAPY INITIAL EVALUATION ADULT - NS ASR FOLLOW COMMAND OT EVAL
50% of the time/able to follow single-step instructions
25% of the time
Solaraze Counseling:  I discussed with the patient the risks of Solaraze including but not limited to erythema, scaling, itching, weeping, crusting, and pain.
75% of the time/able to follow single-step instructions

## 2023-12-08 NOTE — OCCUPATIONAL THERAPY INITIAL EVALUATION ADULT - LEVEL OF INDEPENDENCE: SIT/STAND, REHAB EVAL
dependent (less than 25% patients effort)
maximum assist (25% patients effort)
moderate assist (50% patients effort)/maximum assist (25% patients effort)

## 2023-12-08 NOTE — OCCUPATIONAL THERAPY INITIAL EVALUATION ADULT - PLANNED THERAPY INTERVENTIONS, OT EVAL
ADL retraining/balance training/bed mobility training/transfer training
ADL retraining/balance training/bed mobility training/transfer training
ADL retraining/balance training/bed mobility training/cognitive, visual perceptual/motor coordination training/neuromuscular re-education/ROM/strengthening/transfer training

## 2023-12-08 NOTE — OCCUPATIONAL THERAPY INITIAL EVALUATION ADULT - GENERAL OBSERVATIONS, REHAB EVAL
+IV, +Tele//BP, +VCDs, +Helmet, +NC, +NG tube
+ helmet, + IV, + tele, + right wrist restraint
+IV, +IZABEL drain, +VCBs, +catheter hall, +right wrist restraint, +cardiac monitor

## 2023-12-08 NOTE — OCCUPATIONAL THERAPY INITIAL EVALUATION ADULT - LEVEL OF INDEPENDENCE:TOILET, OT EVAL
dependent (less than 25% patients effort)
dependent (less than 25% patients effort)
secondary to hall/dependent (less than 25% patients effort)

## 2023-12-08 NOTE — CHART NOTE - NSCHARTNOTEFT_GEN_A_CORE
Called by RN that patient had change in exam from what he was signed out from ICU, rapid response was called, I arrived with ICU team who examined patient with me, his pupils were 3mm and sluggish needs stim to answer questions but alert to name and place, follows commands on right, WD on left to noxious. CT head performed which shows increase in acute component of SDH, decreased pneumocephalus, stable effacement of ventricle/MLS.     Discussed with Dr. Larios  Will upgrade to stepdown and q2 neuro checks   pt not on Lovenox, or any AC/AP  Repeat CT head in 6 hours for stability or sooner if change in exam

## 2023-12-08 NOTE — OCCUPATIONAL THERAPY INITIAL EVALUATION ADULT - PERTINENT HX OF CURRENT PROBLEM, REHAB EVAL
As per MD note:  Pt is an 82-year-old, right-handed male with a history of HTN, CAD s/p stents, renal cell carcinoma status post left nephrectomy, bladder CA, BPH, CHF, LBBB, vertigo, HLD, 5.5cm AAA without rupture post EVAR, paroxysmal A-fib on Eliquis, Plavix, and aspirin, early stage Alzheimer dementia. On 11/10/2023 he was transferred from [facility] s/p unwitnessed fall with head strike at home found by wife on floor at 8am. CT head at [place] showed right frontal IPH, SDH, SAH at 9:00. CTA stroke protocol not performed at [facility]. Patient BIB on 6L NC.  Pt GCS 15 on arrival, A&Ox2 on arrival. He underwent right decompressive hemicraniectomy on 11/10/2023. On 11/25/2023, MRI of brain revealed enlargement of anterior falx hemorrhage.
As per MD note: 82M with PMH HTN, HLD pAF (On Eliquis) CAD s/p stents (On DAPT), renal cell carcinoma status post left nephrectomy, bladder CA, BPH, CHF, LBBB, HLD, 5.5cm AAA without rupture post EVAR, early stage Alzheimer dementia transferred from [facility] s/p unwitnessed fall with head strike at home. CT head at [place] showed R frontal IPH, SDH, SAH. Taken to OR for urgent right decompressive craniectomy with Dr. Larios and admitted to Neuro ICU still intubated. Nephrology consulted 11/11 for non-oliguric DONNIE. CCB fever on POD #1. TTE performed on 11/11, found to have calcified AV with mobile echodensity, cardiology consulted. Blood cultures NGTD. CCB rapid AF on 11/13, started on cardizem. Patient extubated on 11/13.
Pt with decompressive craniectomy on 11/10/23 due to unwitnessed fall

## 2023-12-08 NOTE — OCCUPATIONAL THERAPY INITIAL EVALUATION ADULT - REFERRAL TO ANOTHER SERVICE NEEDED, OT EVAL
physical therapy/social work/speech language pathology
physical therapy/speech language pathology
physical therapy

## 2023-12-08 NOTE — PROGRESS NOTE ADULT - SUBJECTIVE AND OBJECTIVE BOX
Right Cranioplasty completed on 12/7   Tachycardic, afebrile     FUNCTIONAL PROGRESS  12/7 PMR  Total A    12/4 MBS  Speech Language Pathology Recommendations: 1. Easy to chew w/moderately thick liquids 2. Aspiration precautions3. Meds whole in puree4. 1:1 assist for all meals5. Upright for all PO, small bites/sips, slow rate6. Oral care7. SLP to follow for diet tolerance monitoring and dysphagia tx, as schedule permits       VITALS  T(C): 36.7 (12-07-23 @ 23:34), Max: 36.9 (12-07-23 @ 15:09)  HR: 120 (12-08-23 @ 07:00) (80 - 120)  BP: 148/89 (12-08-23 @ 07:00) (95/64 - 160/89)  RR: 24 (12-08-23 @ 07:00) (9 - 24)  SpO2: 98% (12-08-23 @ 07:00) (95% - 99%)  Wt(kg): --    MEDICATIONS   acetaminophen     Tablet .. 650 milliGRAM(s) every 6 hours PRN  atorvastatin 80 milliGRAM(s) at bedtime  ceFAZolin  Injectable. 2000 milliGRAM(s) every 8 hours  chlorhexidine 2% Cloths 1 Application(s) daily  diltiazem    Tablet 60 milliGRAM(s) <User Schedule>  folic acid 1 milliGRAM(s) daily  hydrALAZINE Injectable 10 milliGRAM(s) every 2 hours PRN  labetalol Injectable 10 milliGRAM(s) every 2 hours PRN  levETIRAcetam  IVPB 500 milliGRAM(s) every 12 hours  melatonin 5 milliGRAM(s) at bedtime  metoprolol tartrate 50 milliGRAM(s) <User Schedule>  modafinil 100 milliGRAM(s) <User Schedule>  Nephro-roma 1 Tablet(s) daily  ondansetron Injectable 4 milliGRAM(s) every 6 hours PRN  oxyCODONE    IR 5 milliGRAM(s) every 4 hours PRN  oxyCODONE    IR 10 milliGRAM(s) every 4 hours PRN  polyethylene glycol 3350 17 Gram(s) daily  senna 2 Tablet(s) at bedtime  sodium chloride 0.9%. 1000 milliLiter(s) <Continuous>      RECENT LABS/IMAGING  - Reviewed Today                        10.4   9.62  )-----------( 225      ( 08 Dec 2023 04:44 )             31.3     12-08    137  |  105  |  22.1<H>  ----------------------------<  121<H>  5.4<H>   |  22.0  |  1.01    Ca    9.0      08 Dec 2023 04:44  Phos  4.1     12-08  Mg     1.7     12-08      PT/INR - ( 07 Dec 2023 05:35 )   PT: 12.2 sec;   INR: 1.10 ratio         PTT - ( 07 Dec 2023 05:35 )  PTT:29.4 sec  Urinalysis Basic - ( 08 Dec 2023 04:44 )    Color: x / Appearance: x / SG: x / pH: x  Gluc: 121 mg/dL / Ketone: x  / Bili: x / Urobili: x   Blood: x / Protein: x / Nitrite: x   Leuk Esterase: x / RBC: x / WBC x   Sq Epi: x / Non Sq Epi: x / Bacteria: x      MR Brain 11/13 - RIGHT frontoparietal craniectomy with underlying intracranial hemorrhage and edema within the RIGHT frontal lobe. Overlying small RIGHT subdural hemorrhage is also unchanged. Scant hemorrhage in the dependent portions of the BILATERAL lateral ventricles and minimal subarachnoid hemorrhage seen in the BILATERAL frontal and parietal lobes. Moderate periventricular and deep white matter ischemia is noted. Encephalomalacia and gliosis seen in the anterior frontal lobes bilaterally. Tiny acute lacunar infarction in the LEFT cerebellum and tiny acute cortical infarction in the RIGHT parietal lobe. Restricted diffusion also noted about the surgical cavity suggesting infarcted parenchyma.    CXR 11/15 - There is mild pulmonary venous congestion. Left retrocardiac/lower lobe opacity could represent associated pneumonia.    HEAD CT 11/16 - Improved to stable multicompartment intracranial hemorrhages. Right-sided subdural hygroma, larger in size.    US V DOPPLER BLE 11/16 - No evidence of deep venous thrombosis in either lower extremity.    HEAD CT 11/22 - Mixed attenuating subdural hematoma again seen with some expected postoperative changes. Evolving area of parenchymal hemorrhage involving the right frontal region.    HEAD CT 11/23 - Stable multicompartment intracranial hemorrhages. Stable right-sided subdural collection.    HEAD CT 11/29 - Stable intracranial hemorrhages.    HEAD CT 12/2  Right hemicraniectomy. Right subdural fluid attenuation collection as seen on the prior. Decreased conspicuity to regions of acute hemorrhage in the right high frontal parasagittal region as well as stable appearance to hemorrhage in the right insular posterior frontal region in association with lucency as seen on the prior study    HEAD CT 12/5 - Unchanged evolving hemorrhage within the RIGHT frontal lobe, medially and laterally with associated edema. RIGHT frontoparietal hemicraniectomy is again noted with unchanged extra-axial. Mild to moderate periventricular white matter ischemia noted.    HEAD CT 12/8 -  Interval right cerebral cranioplasty and duroplasty with expected postoperative findings, including layering extra-axial hemorrhage. Unchanged parenchymal hemorrhage. Scalp soft tissue swelling with scalp drain.    ----------------------------------------------------------------------------------------  PHYSICAL EXAM  Constitutional - NAD, Comfortable   HEENT - Right craniectomy - CDI  Extremities - No peripheral edema   Neurologic Exam -                    Cognitive - Somnolent     Communication - Limited vebralizations     Motor - Limited exam  Psych - Somnolent  ----------------------------------------------------------------------------------------  ASSESSMENT/PLAN  83yMale with functional deficits after a fall sustaining multicompartmental intracranial bleeding  Wakefulness - Provigil 100mg Q8AM (12/2)  Alzheimer's Dementia - Namenda 5mg BID, Seroquel PRN   CAD, PAFIB - Cardizem, Lopressor, Lipitor, Hydralazine, Labetalol  HTN - Hydralazine, labetalol   Dysphagia - Pureed/mod thick   Sleep - Melatonin    Oropharyngeal Dysphagia - Soft & MOD THICK Liquids  DVT PPX - SCD   Rehab/Impaired mobility and function - Patient continues to require hospitalization for the above diagnoses and ongoing active management of comorbid complications (pending cranioplasty, IV HTN medications) that are substantially impairing functional ability and impairing quality of life necessitating ongoing medical management of these complications.     When medically optimized, based on the patient's diagnosis, current functional status, and potential for progress, recommend [facility], patient DOES NOT meet acute inpatient rehabilitation criteria. Patient needs a more prolonged stay to achieve transition to community living and would not be able to tolerate a comprehensive/intense rehab program of 3hours/day.     Will continue to follow. Rehab recommendations are dependent on how functional progress changes as well as how patient continues to participate and tolerate therapeutic interventions, which may change. Recommend ongoing mobilization by staff to maintain cardiopulmonary function and prevention of secondary complications related to debility. Discussed the specific management and recommendations above with rehab clinical care team/rehab liaison.                   Right Cranioplasty completed on 12/7   More awake and alert, confused   Flaccid paralysis of left UE/LE   Tachycardic, afebrile     FUNCTIONAL PROGRESS  12/7 PMR  Total A    12/4 MBS  Speech Language Pathology Recommendations: 1. Easy to chew w/moderately thick liquids 2. Aspiration precautions3. Meds whole in puree4. 1:1 assist for all meals5. Upright for all PO, small bites/sips, slow rate6. Oral care7. SLP to follow for diet tolerance monitoring and dysphagia tx, as schedule permits       VITALS  T(C): 36.7 (12-07-23 @ 23:34), Max: 36.9 (12-07-23 @ 15:09)  HR: 120 (12-08-23 @ 07:00) (80 - 120)  BP: 148/89 (12-08-23 @ 07:00) (95/64 - 160/89)  RR: 24 (12-08-23 @ 07:00) (9 - 24)  SpO2: 98% (12-08-23 @ 07:00) (95% - 99%)  Wt(kg): --    MEDICATIONS   acetaminophen     Tablet .. 650 milliGRAM(s) every 6 hours PRN  atorvastatin 80 milliGRAM(s) at bedtime  ceFAZolin  Injectable. 2000 milliGRAM(s) every 8 hours  chlorhexidine 2% Cloths 1 Application(s) daily  diltiazem    Tablet 60 milliGRAM(s) <User Schedule>  folic acid 1 milliGRAM(s) daily  hydrALAZINE Injectable 10 milliGRAM(s) every 2 hours PRN  labetalol Injectable 10 milliGRAM(s) every 2 hours PRN  levETIRAcetam  IVPB 500 milliGRAM(s) every 12 hours  melatonin 5 milliGRAM(s) at bedtime  metoprolol tartrate 50 milliGRAM(s) <User Schedule>  modafinil 100 milliGRAM(s) <User Schedule>  Nephro-roma 1 Tablet(s) daily  ondansetron Injectable 4 milliGRAM(s) every 6 hours PRN  oxyCODONE    IR 5 milliGRAM(s) every 4 hours PRN  oxyCODONE    IR 10 milliGRAM(s) every 4 hours PRN  polyethylene glycol 3350 17 Gram(s) daily  senna 2 Tablet(s) at bedtime  sodium chloride 0.9%. 1000 milliLiter(s) <Continuous>      RECENT LABS/IMAGING  - Reviewed Today                        10.4   9.62  )-----------( 225      ( 08 Dec 2023 04:44 )             31.3     12-08    137  |  105  |  22.1<H>  ----------------------------<  121<H>  5.4<H>   |  22.0  |  1.01    Ca    9.0      08 Dec 2023 04:44  Phos  4.1     12-08  Mg     1.7     12-08      PT/INR - ( 07 Dec 2023 05:35 )   PT: 12.2 sec;   INR: 1.10 ratio         PTT - ( 07 Dec 2023 05:35 )  PTT:29.4 sec  Urinalysis Basic - ( 08 Dec 2023 04:44 )    Color: x / Appearance: x / SG: x / pH: x  Gluc: 121 mg/dL / Ketone: x  / Bili: x / Urobili: x   Blood: x / Protein: x / Nitrite: x   Leuk Esterase: x / RBC: x / WBC x   Sq Epi: x / Non Sq Epi: x / Bacteria: x      MR Brain 11/13 - RIGHT frontoparietal craniectomy with underlying intracranial hemorrhage and edema within the RIGHT frontal lobe. Overlying small RIGHT subdural hemorrhage is also unchanged. Scant hemorrhage in the dependent portions of the BILATERAL lateral ventricles and minimal subarachnoid hemorrhage seen in the BILATERAL frontal and parietal lobes. Moderate periventricular and deep white matter ischemia is noted. Encephalomalacia and gliosis seen in the anterior frontal lobes bilaterally. Tiny acute lacunar infarction in the LEFT cerebellum and tiny acute cortical infarction in the RIGHT parietal lobe. Restricted diffusion also noted about the surgical cavity suggesting infarcted parenchyma.    CXR 11/15 - There is mild pulmonary venous congestion. Left retrocardiac/lower lobe opacity could represent associated pneumonia.    HEAD CT 11/16 - Improved to stable multicompartment intracranial hemorrhages. Right-sided subdural hygroma, larger in size.    US V DOPPLER BLE 11/16 - No evidence of deep venous thrombosis in either lower extremity.    HEAD CT 11/22 - Mixed attenuating subdural hematoma again seen with some expected postoperative changes. Evolving area of parenchymal hemorrhage involving the right frontal region.    HEAD CT 11/23 - Stable multicompartment intracranial hemorrhages. Stable right-sided subdural collection.    HEAD CT 11/29 - Stable intracranial hemorrhages.    HEAD CT 12/2  Right hemicraniectomy. Right subdural fluid attenuation collection as seen on the prior. Decreased conspicuity to regions of acute hemorrhage in the right high frontal parasagittal region as well as stable appearance to hemorrhage in the right insular posterior frontal region in association with lucency as seen on the prior study    HEAD CT 12/5 - Unchanged evolving hemorrhage within the RIGHT frontal lobe, medially and laterally with associated edema. RIGHT frontoparietal hemicraniectomy is again noted with unchanged extra-axial. Mild to moderate periventricular white matter ischemia noted.    HEAD CT 12/8 -  Interval right cerebral cranioplasty and duroplasty with expected postoperative findings, including layering extra-axial hemorrhage. Unchanged parenchymal hemorrhage. Scalp soft tissue swelling with scalp drain.    ----------------------------------------------------------------------------------------  PHYSICAL EXAM  Constitutional - NAD, Comfortable   HEENT - Right craniotomy   Extremities - No peripheral edema   Neurologic Exam -                    Cognitive - Interactive, awake      Communication - (+) confusion, interactive      Motor - Flaccid paralysis LUE/LLE   Psych - Confused   ----------------------------------------------------------------------------------------  ASSESSMENT/PLAN  83yMale with functional deficits after a fall sustaining multicompartmental intracranial bleeding  Wakefulness - Provigil 100mg Q8AM (12/2)  Alzheimer's Dementia - Recommend restarting Namenda 5mg BID, Seroquel PRN   CAD, PAFIB - Cardizem, Lopressor, Lipitor, Hydralazine, Labetalol  HTN - Hydralazine, labetalol   Dysphagia - Pureed/mod thick   Sleep - Melatonin    Oropharyngeal Dysphagia - Regular   DVT PPX - SCD, Recommend restarting DVT ppx when cleared by NSGY   Rehab/Impaired mobility and function - Continuous hospitalization is crucial for managing the patient's acute medical issues, which have significantly impacted their mobility, quality of life, and function. Rehabilitation recommendations will be based on the patient's functional progress and their ability to participate in and tolerate therapeutic interventions, which may change over time. Maintaining ongoing mobilization by the staff is imperative to prevent secondary medical complications and associated health issues related to debility.    Given patient's medical diagnosis, functional status, and potential for progress, he is likely a good candidate for [facility] placement. He currently DOES NOT meet the criteria for acute inpatient rehabilitation and would not tolerate 3h of intensive PT/OT/SLP daily. Discharge recommendations are subject to change and PM&R team will continue to follow to monitor progress while in the hospital.

## 2023-12-08 NOTE — OCCUPATIONAL THERAPY INITIAL EVALUATION ADULT - RANGE OF MOTION EXAMINATION, UPPER EXTREMITY
Left UE Passive ROM was WFL  (within functional limits)/Right UE Active ROM was WFL (within functional limits)
left UE no AROM./Left UE Passive ROM was WFL  (within functional limits)/Right UE Active ROM was WFL (within functional limits)
no active movement noted left UE/Left UE Passive ROM was WFL  (within functional limits)/Right UE Active ROM was WFL (within functional limits)

## 2023-12-09 PROCEDURE — 70450 CT HEAD/BRAIN W/O DYE: CPT | Mod: 26,77

## 2023-12-09 PROCEDURE — 70450 CT HEAD/BRAIN W/O DYE: CPT | Mod: 26

## 2023-12-09 RX ORDER — DOXAZOSIN MESYLATE 4 MG
2 TABLET ORAL AT BEDTIME
Refills: 0 | Status: DISCONTINUED | OUTPATIENT
Start: 2023-12-09 | End: 2024-01-12

## 2023-12-09 RX ADMIN — ATORVASTATIN CALCIUM 80 MILLIGRAM(S): 80 TABLET, FILM COATED ORAL at 21:47

## 2023-12-09 RX ADMIN — Medication 2000 MILLIGRAM(S): at 21:48

## 2023-12-09 RX ADMIN — Medication 2000 MILLIGRAM(S): at 05:47

## 2023-12-09 RX ADMIN — Medication 1 MILLIGRAM(S): at 11:25

## 2023-12-09 RX ADMIN — Medication 50 MILLIGRAM(S): at 11:25

## 2023-12-09 RX ADMIN — MODAFINIL 100 MILLIGRAM(S): 200 TABLET ORAL at 11:25

## 2023-12-09 RX ADMIN — LEVETIRACETAM 400 MILLIGRAM(S): 250 TABLET, FILM COATED ORAL at 05:47

## 2023-12-09 RX ADMIN — Medication 5 MILLIGRAM(S): at 21:47

## 2023-12-09 RX ADMIN — Medication 1 TABLET(S): at 19:01

## 2023-12-09 RX ADMIN — Medication 2000 MILLIGRAM(S): at 14:47

## 2023-12-09 RX ADMIN — SENNA PLUS 2 TABLET(S): 8.6 TABLET ORAL at 21:47

## 2023-12-09 RX ADMIN — Medication 60 MILLIGRAM(S): at 19:01

## 2023-12-09 RX ADMIN — Medication 2 MILLIGRAM(S): at 22:02

## 2023-12-09 RX ADMIN — LEVETIRACETAM 400 MILLIGRAM(S): 250 TABLET, FILM COATED ORAL at 19:01

## 2023-12-09 RX ADMIN — POLYETHYLENE GLYCOL 3350 17 GRAM(S): 17 POWDER, FOR SOLUTION ORAL at 11:25

## 2023-12-09 RX ADMIN — Medication 60 MILLIGRAM(S): at 05:47

## 2023-12-09 NOTE — PROGRESS NOTE ADULT - SUBJECTIVE AND OBJECTIVE BOX
INTERVAL HPI/OVERNIGHT EVENTS:  Post op Day # 1 Right Cranioplasty   83M w/ PMH HTN, CAD s/p stents on ASA/Plavix, RCC s/p L nephrectomy, bladder CA, BPH, CHF, LBBB, vertigo, HLD, 5.5 cm AAA without rupture post EVAR, paroxysmal afib on Eliquis, early stage Alzheimer's dementia, presented to [facility] 11/10/23 s/p unwitnessed fall with head strike at home found by wife, found with multicompartmental ICH, s/p R craniectomy 11/10/23, course significant for Klebsiella pneumonia, hypernatremia, lethargy, LUE DVT, new anterior falcine SDH, downgraded to SDU 11/28  s/p R cranioplasty POD#1  PT awake, alert resp, follows commands with encouragement.     MEDICATIONS  (STANDING):  atorvastatin 80 milliGRAM(s) Oral at bedtime  ceFAZolin  Injectable. 2000 milliGRAM(s) IV Push every 8 hours  diltiazem    Tablet 60 milliGRAM(s) Oral <User Schedule>  folic acid 1 milliGRAM(s) Oral daily  levETIRAcetam  IVPB 500 milliGRAM(s) IV Intermittent every 12 hours  melatonin 5 milliGRAM(s) Oral at bedtime  metoprolol tartrate 50 milliGRAM(s) Oral <User Schedule>  modafinil 100 milliGRAM(s) Oral <User Schedule>  Nephro-roma 1 Tablet(s) Oral daily  polyethylene glycol 3350 17 Gram(s) Oral daily  senna 2 Tablet(s) Oral at bedtime    MEDICATIONS  (PRN):  acetaminophen     Tablet .. 650 milliGRAM(s) Oral every 6 hours PRN Temp greater or equal to 38C (100.4F), Mild Pain (1 - 3)  hydrALAZINE Injectable 10 milliGRAM(s) IV Push every 2 hours PRN SBP > 160 mmHg  labetalol Injectable 10 milliGRAM(s) IV Push every 2 hours PRN Systolic blood pressure > 160  ondansetron Injectable 4 milliGRAM(s) IV Push every 6 hours PRN Nausea and/or Vomiting  oxyCODONE    IR 5 milliGRAM(s) Oral every 4 hours PRN Moderate Pain (4 - 6)  oxyCODONE    IR 10 milliGRAM(s) Oral every 4 hours PRN Severe Pain (7 - 10)      Allergies  penicillin (Rash)  heparin (Other (Severe))  penicillin (Hives)  Cipro (Other)  Levaquin (Other)  heparin (Other)    Intolerances      Vital Signs Last 24 Hrs  T(C): 37.6 (09 Dec 2023 10:00), Max: 37.7 (09 Dec 2023 08:00)  T(F): 99.7 (09 Dec 2023 10:00), Max: 99.9 (09 Dec 2023 08:00)  HR: 96 (09 Dec 2023 11:14) (86 - 114)  BP: 131/77 (09 Dec 2023 11:14) (95/57 - 155/86)  BP(mean): 93 (09 Dec 2023 11:14) (78 - 105)  RR: 21 (09 Dec 2023 11:14) (13 - 22)  SpO2: 98% (09 Dec 2023 11:14) (94% - 100%)    Parameters below as of 09 Dec 2023 11:14  Patient On (Oxygen Delivery Method): room air     BMI (kg/m2): 25.1 (12-07-23 @ 15:09)      PHYSICAL EXAM  GCS e4v 4, m6-14  GENERAL: NAD,   HEAD:  dressing intact dry, jennifer 100 cc/24 hours.  EYES: EOMI, PERRLA, conjunctiva and sclera clear  ENMT: No tonsillar erythema, exudates, or enlargement; Moist mucous membranes,  NECK: Supple,  NERVOUS SYSTEM:  Alert & Oriented X3; Motor Strength right 4/5 upper and 4/5  lower extremities; right lower extrem moves.  left sided withdraws   CHEST/LUNG: Clear  bilat  HEART: Regular rate and rhythm;   ABDOMEN: Soft, Nontender, Nondistended; Bowel sounds present  EXTREMITIES:  2+ Peripheral Pulses, No edema    LABS:                          10.4   9.62  )-----------( 225      ( 08 Dec 2023 04:44 )             31.3     12-08    139  |  105  |  23.2<H>  ----------------------------<  111<H>  5.0   |  23.0  |  1.02    Ca    9.1      08 Dec 2023 11:15  Phos  4.1     12-08  Mg     1.7     12-08        Urinalysis Basic - ( 08 Dec 2023 11:15 )    Color: x / Appearance: x / SG: x / pH: x  Gluc: 111 mg/dL / Ketone: x  / Bili: x / Urobili: x   Blood: x / Protein: x / Nitrite: x   Leuk Esterase: x / RBC: x / WBC x   Sq Epi: x / Non Sq Epi: x / Bacteria: x      I&O's Detail    08 Dec 2023 07:01  -  09 Dec 2023 07:00  --------------------------------------------------------  IN:    sodium chloride 0.9%: 300 mL  Total IN: 300 mL    OUT:    Bulb (mL): 100 mL    Indwelling Catheter - Urethral (mL): 550 mL    Voided (mL): 60 mL  Total OUT: 710 mL    Total NET: -410 mL      09 Dec 2023 07:01  -  09 Dec 2023 12:33  --------------------------------------------------------  IN:  Total IN: 0 mL    OUT:    Bulb (mL): 25 mL    Voided (mL): 100 mL  Total OUT: 125 mL    Total NET: -125 mL      RADIOLOGY & ADDITIONAL TESTS:  < from: CT Head No Cont (12.08.23 @ 19:11) >  1.  Interval worsening of the acute component of the right frontal   convexity subdural hematoma as detailed above. Otherwise, no significant   interval change.  Critical findings discussed with Laquita Quintana by Dr. Chau Diez at   12/8/2023 7:52 PM.  --- End of Report ---     INTERVAL HPI/OVERNIGHT EVENTS:  Post op Day # 1 Right Cranioplasty   83M w/ PMH HTN, CAD s/p stents on ASA/Plavix, RCC s/p L nephrectomy, bladder CA, BPH, CHF, LBBB, vertigo, HLD, 5.5 cm AAA without rupture post EVAR, paroxysmal afib on Eliquis, early stage Alzheimer's dementia, presented to [facility] 11/10/23 s/p unwitnessed fall with head strike at home found by wife, found with multicompartmental ICH, s/p R craniectomy 11/10/23, course significant for Klebsiella pneumonia, hypernatremia, lethargy, LUE DVT, new anterior falcine SDH, downgraded to SDU 11/28  s/p R cranioplasty POD#1  PT awake, alert resp, follows commands with encouragement.     MEDICATIONS  (STANDING):  atorvastatin 80 milliGRAM(s) Oral at bedtime  ceFAZolin  Injectable. 2000 milliGRAM(s) IV Push every 8 hours  diltiazem    Tablet 60 milliGRAM(s) Oral <User Schedule>  folic acid 1 milliGRAM(s) Oral daily  levETIRAcetam  IVPB 500 milliGRAM(s) IV Intermittent every 12 hours  melatonin 5 milliGRAM(s) Oral at bedtime  metoprolol tartrate 50 milliGRAM(s) Oral <User Schedule>  modafinil 100 milliGRAM(s) Oral <User Schedule>  Nephro-roma 1 Tablet(s) Oral daily  polyethylene glycol 3350 17 Gram(s) Oral daily  senna 2 Tablet(s) Oral at bedtime    MEDICATIONS  (PRN):  acetaminophen     Tablet .. 650 milliGRAM(s) Oral every 6 hours PRN Temp greater or equal to 38C (100.4F), Mild Pain (1 - 3)  hydrALAZINE Injectable 10 milliGRAM(s) IV Push every 2 hours PRN SBP > 160 mmHg  labetalol Injectable 10 milliGRAM(s) IV Push every 2 hours PRN Systolic blood pressure > 160  ondansetron Injectable 4 milliGRAM(s) IV Push every 6 hours PRN Nausea and/or Vomiting  oxyCODONE    IR 5 milliGRAM(s) Oral every 4 hours PRN Moderate Pain (4 - 6)  oxyCODONE    IR 10 milliGRAM(s) Oral every 4 hours PRN Severe Pain (7 - 10)      Allergies  penicillin (Rash)  heparin (Other (Severe))  penicillin (Hives)  Cipro (Other)  Levaquin (Other)  heparin (Other)    Intolerances      Vital Signs Last 24 Hrs  T(C): 37.6 (09 Dec 2023 10:00), Max: 37.7 (09 Dec 2023 08:00)  T(F): 99.7 (09 Dec 2023 10:00), Max: 99.9 (09 Dec 2023 08:00)  HR: 96 (09 Dec 2023 11:14) (86 - 114)  BP: 131/77 (09 Dec 2023 11:14) (95/57 - 155/86)  BP(mean): 93 (09 Dec 2023 11:14) (78 - 105)  RR: 21 (09 Dec 2023 11:14) (13 - 22)  SpO2: 98% (09 Dec 2023 11:14) (94% - 100%)    Parameters below as of 09 Dec 2023 11:14  Patient On (Oxygen Delivery Method): room air     BMI (kg/m2): 25.1 (12-07-23 @ 15:09)      PHYSICAL EXAM  GCS e4v 4, m6-14  GENERAL: NAD,   HEAD:  dressing intact dry, jennifer 100 cc/24 hours.  EYES: EOMI, PERRLA, conjunctiva and sclera clear  ENMT: No tonsillar erythema, exudates, or enlargement; Moist mucous membranes,  NECK: Supple,  NERVOUS SYSTEM:  Alert & Oriented X3; Motor Strength right 4/5 upper and 4/5  lower extremities; right lower extrem moves.  left sided withdraws   CHEST/LUNG: Clear  bilat  HEART: Regular rate and rhythm;   ABDOMEN: Soft, Nontender, Nondistended; Bowel sounds present  EXTREMITIES:  2+ Peripheral Pulses, No edema    LABS:                          10.4   9.62  )-----------( 225      ( 08 Dec 2023 04:44 )             31.3     12-08    139  |  105  |  23.2<H>  ----------------------------<  111<H>  5.0   |  23.0  |  1.02    Ca    9.1      08 Dec 2023 11:15  Phos  4.1     12-08  Mg     1.7     12-08        Urinalysis Basic - ( 08 Dec 2023 11:15 )    Color: x / Appearance: x / SG: x / pH: x  Gluc: 111 mg/dL / Ketone: x  / Bili: x / Urobili: x   Blood: x / Protein: x / Nitrite: x   Leuk Esterase: x / RBC: x / WBC x   Sq Epi: x / Non Sq Epi: x / Bacteria: x      I&O's Detail    08 Dec 2023 07:01  -  09 Dec 2023 07:00  --------------------------------------------------------  IN:    sodium chloride 0.9%: 300 mL  Total IN: 300 mL    OUT:    Bulb (mL): 100 mL    Indwelling Catheter - Urethral (mL): 550 mL    Voided (mL): 60 mL  Total OUT: 710 mL    Total NET: -410 mL      09 Dec 2023 07:01  -  09 Dec 2023 12:33  --------------------------------------------------------  IN:  Total IN: 0 mL    OUT:    Bulb (mL): 25 mL    Voided (mL): 100 mL  Total OUT: 125 mL    Total NET: -125 mL      RADIOLOGY & ADDITIONAL TESTS:  < from: CT Head No Cont (12.08.23 @ 19:11) >  1.  Interval worsening of the acute component of the right frontal   convexity subdural hematoma as detailed above. Otherwise, no significant   interval change.  Critical findings discussed with Laquita Quintana by Dr. Chau Diez at   12/8/2023 7:52 PM.  --- End of Report ---

## 2023-12-09 NOTE — PROGRESS NOTE ADULT - ASSESSMENT
This is a pt admitted on 11.10 for hemicraniectomy for a sdh/ edh  Pt s/p cranioplasty on 12/19     Pt ct of the brain demonstrates a right frontal sdh.   Plan  -Pt will continue neuro q 2  -will continue to monitor IZABEL and consider discontinuing drain in 12/10  -continue anbx for the drain  -pain control tylenol  -keppra 500mg q 12 continued  -dvt ppx compression dvt  -PT/oT/ pmr/ rehab  - This is a pt admitted on 11.10 for hemicraniectomy for a sdh/ edh  Pt s/p cranioplasty on 12/19     Pt ct of the brain demonstrates a right frontal sdh.   Plan  -Pt will continue neuro q 2  -will continue to monitor IZABEL and consider discontinuing drain in 12/10  -continue anbx for the drain  -pain control tylenol  -keppra 500mg q 12 continued  -dvt ppx compression dvt  -PT/oT/ pmr/ rehab  Pt had post void bladder scan -800 cc noted. -day prior also noted for high residuals -hall will be replaced and start Cardura   -

## 2023-12-10 DIAGNOSIS — I47.19 OTHER SUPRAVENTRICULAR TACHYCARDIA: ICD-10-CM

## 2023-12-10 DIAGNOSIS — I25.10 ATHEROSCLEROTIC HEART DISEASE OF NATIVE CORONARY ARTERY WITHOUT ANGINA PECTORIS: ICD-10-CM

## 2023-12-10 LAB
ANION GAP SERPL CALC-SCNC: 11 MMOL/L — SIGNIFICANT CHANGE UP (ref 5–17)
ANION GAP SERPL CALC-SCNC: 11 MMOL/L — SIGNIFICANT CHANGE UP (ref 5–17)
APPEARANCE UR: CLEAR — SIGNIFICANT CHANGE UP
APPEARANCE UR: CLEAR — SIGNIFICANT CHANGE UP
BACTERIA # UR AUTO: NEGATIVE /HPF — SIGNIFICANT CHANGE UP
BACTERIA # UR AUTO: NEGATIVE /HPF — SIGNIFICANT CHANGE UP
BILIRUB UR-MCNC: NEGATIVE — SIGNIFICANT CHANGE UP
BILIRUB UR-MCNC: NEGATIVE — SIGNIFICANT CHANGE UP
BUN SERPL-MCNC: 24 MG/DL — HIGH (ref 8–20)
BUN SERPL-MCNC: 24 MG/DL — HIGH (ref 8–20)
CALCIUM SERPL-MCNC: 8.8 MG/DL — SIGNIFICANT CHANGE UP (ref 8.4–10.5)
CALCIUM SERPL-MCNC: 8.8 MG/DL — SIGNIFICANT CHANGE UP (ref 8.4–10.5)
CAST: 2 /LPF — SIGNIFICANT CHANGE UP (ref 0–4)
CAST: 2 /LPF — SIGNIFICANT CHANGE UP (ref 0–4)
CHLORIDE SERPL-SCNC: 105 MMOL/L — SIGNIFICANT CHANGE UP (ref 96–108)
CHLORIDE SERPL-SCNC: 105 MMOL/L — SIGNIFICANT CHANGE UP (ref 96–108)
CO2 SERPL-SCNC: 23 MMOL/L — SIGNIFICANT CHANGE UP (ref 22–29)
CO2 SERPL-SCNC: 23 MMOL/L — SIGNIFICANT CHANGE UP (ref 22–29)
COLOR SPEC: YELLOW — SIGNIFICANT CHANGE UP
COLOR SPEC: YELLOW — SIGNIFICANT CHANGE UP
CREAT SERPL-MCNC: 1.06 MG/DL — SIGNIFICANT CHANGE UP (ref 0.5–1.3)
CREAT SERPL-MCNC: 1.06 MG/DL — SIGNIFICANT CHANGE UP (ref 0.5–1.3)
DIFF PNL FLD: ABNORMAL
DIFF PNL FLD: ABNORMAL
EGFR: 70 ML/MIN/1.73M2 — SIGNIFICANT CHANGE UP
EGFR: 70 ML/MIN/1.73M2 — SIGNIFICANT CHANGE UP
GLUCOSE SERPL-MCNC: 117 MG/DL — HIGH (ref 70–99)
GLUCOSE SERPL-MCNC: 117 MG/DL — HIGH (ref 70–99)
GLUCOSE UR QL: NEGATIVE MG/DL — SIGNIFICANT CHANGE UP
GLUCOSE UR QL: NEGATIVE MG/DL — SIGNIFICANT CHANGE UP
HCT VFR BLD CALC: 23.9 % — LOW (ref 39–50)
HCT VFR BLD CALC: 23.9 % — LOW (ref 39–50)
HGB BLD-MCNC: 8.1 G/DL — LOW (ref 13–17)
HGB BLD-MCNC: 8.1 G/DL — LOW (ref 13–17)
KETONES UR-MCNC: ABNORMAL MG/DL
KETONES UR-MCNC: ABNORMAL MG/DL
LEUKOCYTE ESTERASE UR-ACNC: ABNORMAL
LEUKOCYTE ESTERASE UR-ACNC: ABNORMAL
MAGNESIUM SERPL-MCNC: 1.8 MG/DL — SIGNIFICANT CHANGE UP (ref 1.8–2.6)
MAGNESIUM SERPL-MCNC: 1.8 MG/DL — SIGNIFICANT CHANGE UP (ref 1.8–2.6)
MCHC RBC-ENTMCNC: 32.4 PG — SIGNIFICANT CHANGE UP (ref 27–34)
MCHC RBC-ENTMCNC: 32.4 PG — SIGNIFICANT CHANGE UP (ref 27–34)
MCHC RBC-ENTMCNC: 33.9 GM/DL — SIGNIFICANT CHANGE UP (ref 32–36)
MCHC RBC-ENTMCNC: 33.9 GM/DL — SIGNIFICANT CHANGE UP (ref 32–36)
MCV RBC AUTO: 95.6 FL — SIGNIFICANT CHANGE UP (ref 80–100)
MCV RBC AUTO: 95.6 FL — SIGNIFICANT CHANGE UP (ref 80–100)
NITRITE UR-MCNC: NEGATIVE — SIGNIFICANT CHANGE UP
NITRITE UR-MCNC: NEGATIVE — SIGNIFICANT CHANGE UP
PH UR: 6 — SIGNIFICANT CHANGE UP (ref 5–8)
PH UR: 6 — SIGNIFICANT CHANGE UP (ref 5–8)
PHOSPHATE SERPL-MCNC: 2.5 MG/DL — SIGNIFICANT CHANGE UP (ref 2.4–4.7)
PHOSPHATE SERPL-MCNC: 2.5 MG/DL — SIGNIFICANT CHANGE UP (ref 2.4–4.7)
PLATELET # BLD AUTO: 138 K/UL — LOW (ref 150–400)
PLATELET # BLD AUTO: 138 K/UL — LOW (ref 150–400)
POTASSIUM SERPL-MCNC: 4.1 MMOL/L — SIGNIFICANT CHANGE UP (ref 3.5–5.3)
POTASSIUM SERPL-MCNC: 4.1 MMOL/L — SIGNIFICANT CHANGE UP (ref 3.5–5.3)
POTASSIUM SERPL-SCNC: 4.1 MMOL/L — SIGNIFICANT CHANGE UP (ref 3.5–5.3)
POTASSIUM SERPL-SCNC: 4.1 MMOL/L — SIGNIFICANT CHANGE UP (ref 3.5–5.3)
PROT UR-MCNC: 100 MG/DL
PROT UR-MCNC: 100 MG/DL
RAPID RVP RESULT: SIGNIFICANT CHANGE UP
RAPID RVP RESULT: SIGNIFICANT CHANGE UP
RBC # BLD: 2.5 M/UL — LOW (ref 4.2–5.8)
RBC # BLD: 2.5 M/UL — LOW (ref 4.2–5.8)
RBC # FLD: 14.3 % — SIGNIFICANT CHANGE UP (ref 10.3–14.5)
RBC # FLD: 14.3 % — SIGNIFICANT CHANGE UP (ref 10.3–14.5)
RBC CASTS # UR COMP ASSIST: 24 /HPF — HIGH (ref 0–4)
RBC CASTS # UR COMP ASSIST: 24 /HPF — HIGH (ref 0–4)
SARS-COV-2 RNA SPEC QL NAA+PROBE: SIGNIFICANT CHANGE UP
SARS-COV-2 RNA SPEC QL NAA+PROBE: SIGNIFICANT CHANGE UP
SODIUM SERPL-SCNC: 139 MMOL/L — SIGNIFICANT CHANGE UP (ref 135–145)
SODIUM SERPL-SCNC: 139 MMOL/L — SIGNIFICANT CHANGE UP (ref 135–145)
SP GR SPEC: 1.02 — SIGNIFICANT CHANGE UP (ref 1–1.03)
SP GR SPEC: 1.02 — SIGNIFICANT CHANGE UP (ref 1–1.03)
SQUAMOUS # UR AUTO: 0 /HPF — SIGNIFICANT CHANGE UP (ref 0–5)
SQUAMOUS # UR AUTO: 0 /HPF — SIGNIFICANT CHANGE UP (ref 0–5)
UROBILINOGEN FLD QL: 0.2 MG/DL — SIGNIFICANT CHANGE UP (ref 0.2–1)
UROBILINOGEN FLD QL: 0.2 MG/DL — SIGNIFICANT CHANGE UP (ref 0.2–1)
WBC # BLD: 8.18 K/UL — SIGNIFICANT CHANGE UP (ref 3.8–10.5)
WBC # BLD: 8.18 K/UL — SIGNIFICANT CHANGE UP (ref 3.8–10.5)
WBC # FLD AUTO: 8.18 K/UL — SIGNIFICANT CHANGE UP (ref 3.8–10.5)
WBC # FLD AUTO: 8.18 K/UL — SIGNIFICANT CHANGE UP (ref 3.8–10.5)
WBC UR QL: 46 /HPF — HIGH (ref 0–5)
WBC UR QL: 46 /HPF — HIGH (ref 0–5)

## 2023-12-10 PROCEDURE — 71045 X-RAY EXAM CHEST 1 VIEW: CPT | Mod: 26

## 2023-12-10 PROCEDURE — 99232 SBSQ HOSP IP/OBS MODERATE 35: CPT

## 2023-12-10 PROCEDURE — 93010 ELECTROCARDIOGRAM REPORT: CPT

## 2023-12-10 RX ORDER — CEFTRIAXONE 500 MG/1
1000 INJECTION, POWDER, FOR SOLUTION INTRAMUSCULAR; INTRAVENOUS EVERY 24 HOURS
Refills: 0 | Status: COMPLETED | OUTPATIENT
Start: 2023-12-10 | End: 2023-12-16

## 2023-12-10 RX ORDER — MAGNESIUM OXIDE 400 MG ORAL TABLET 241.3 MG
400 TABLET ORAL ONCE
Refills: 0 | Status: COMPLETED | OUTPATIENT
Start: 2023-12-10 | End: 2023-12-10

## 2023-12-10 RX ORDER — SODIUM CHLORIDE 9 MG/ML
3 INJECTION INTRAMUSCULAR; INTRAVENOUS; SUBCUTANEOUS EVERY 8 HOURS
Refills: 0 | Status: DISCONTINUED | OUTPATIENT
Start: 2023-12-10 | End: 2023-12-17

## 2023-12-10 RX ORDER — SODIUM CHLORIDE 9 MG/ML
1000 INJECTION INTRAMUSCULAR; INTRAVENOUS; SUBCUTANEOUS
Refills: 0 | Status: DISCONTINUED | OUTPATIENT
Start: 2023-12-10 | End: 2023-12-16

## 2023-12-10 RX ADMIN — Medication 60 MILLIGRAM(S): at 18:43

## 2023-12-10 RX ADMIN — CEFTRIAXONE 1000 MILLIGRAM(S): 500 INJECTION, POWDER, FOR SOLUTION INTRAMUSCULAR; INTRAVENOUS at 22:08

## 2023-12-10 RX ADMIN — Medication 2000 MILLIGRAM(S): at 05:22

## 2023-12-10 RX ADMIN — Medication 1 MILLIGRAM(S): at 11:56

## 2023-12-10 RX ADMIN — Medication 60 MILLIGRAM(S): at 05:26

## 2023-12-10 RX ADMIN — LEVETIRACETAM 400 MILLIGRAM(S): 250 TABLET, FILM COATED ORAL at 18:44

## 2023-12-10 RX ADMIN — Medication 2 MILLIGRAM(S): at 22:02

## 2023-12-10 RX ADMIN — Medication 1 TABLET(S): at 11:55

## 2023-12-10 RX ADMIN — SENNA PLUS 2 TABLET(S): 8.6 TABLET ORAL at 22:02

## 2023-12-10 RX ADMIN — MODAFINIL 100 MILLIGRAM(S): 200 TABLET ORAL at 08:14

## 2023-12-10 RX ADMIN — SODIUM CHLORIDE 3 MILLILITER(S): 9 INJECTION INTRAMUSCULAR; INTRAVENOUS; SUBCUTANEOUS at 21:59

## 2023-12-10 RX ADMIN — Medication 5 MILLIGRAM(S): at 22:02

## 2023-12-10 RX ADMIN — LEVETIRACETAM 400 MILLIGRAM(S): 250 TABLET, FILM COATED ORAL at 05:22

## 2023-12-10 RX ADMIN — POLYETHYLENE GLYCOL 3350 17 GRAM(S): 17 POWDER, FOR SOLUTION ORAL at 11:56

## 2023-12-10 RX ADMIN — ATORVASTATIN CALCIUM 80 MILLIGRAM(S): 80 TABLET, FILM COATED ORAL at 22:02

## 2023-12-10 RX ADMIN — MAGNESIUM OXIDE 400 MG ORAL TABLET 400 MILLIGRAM(S): 241.3 TABLET ORAL at 22:08

## 2023-12-10 RX ADMIN — SODIUM CHLORIDE 75 MILLILITER(S): 9 INJECTION INTRAMUSCULAR; INTRAVENOUS; SUBCUTANEOUS at 16:09

## 2023-12-10 NOTE — CHART NOTE - NSCHARTNOTEFT_GEN_A_CORE
called by RN to review vitals and rectal temp of 100.3 at 6 PM alongside tachycardia.  pt's vitals and chart/ cardio recommendations reviewed and appropriate fever w/u orders placed   EKG was obtained in AM and reviewed w cardio/ recommendations respected, Dr Larios to weigh in on ASA vs Plavix for cardiac hx  portable CXR to r/o aspiration and made NPO at this time and IVF increased from 75 to 125cc/hr   blood cx x2 to r/o bacteremia given recent calioplasty/ surgical intervention and post-op drain  UA and Cx via straight cath to r/o UTI   LED b/l given right left sided weakness and prolonged bedrest/ limited mobility   RVP to r/o resp agent     per d/w Dr Larios, tx to medical service was made   [place]queira [place] [facility] [place] Vital Signs Last 24 Hrs  T(C): 37.9 (10 Dec 2023 17:53), Max: 37.9 (10 Dec 2023 17:53)  T(F): 100.2 (10 Dec 2023 17:53), Max: 100.2 (10 Dec 2023 17:53)  HR: 120 (10 Dec 2023 17:53) (82 - 138)  BP: 103/63 (10 Dec 2023 17:53) (100/60 - 132/85)  BP(mean): 75 (10 Dec 2023 17:53) (71 - 97)  ABP: --  ABP(mean): --  RR: 20 (10 Dec 2023 17:53) (15 - 20)  SpO2: 95% (10 Dec 2023 17:53) (95% - 100%)    O2 Parameters below as of 10 Dec 2023 17:53  Patient On (Oxygen Delivery Method): room air        MEDICATIONS  (STANDING):  atorvastatin 80 milliGRAM(s) Oral at bedtime  ceFAZolin  Injectable. 2000 milliGRAM(s) IV Push every 8 hours  diltiazem    Tablet 60 milliGRAM(s) Oral <User Schedule>  doxazosin 2 milliGRAM(s) Oral at bedtime  folic acid 1 milliGRAM(s) Oral daily  levETIRAcetam  IVPB 500 milliGRAM(s) IV Intermittent every 12 hours  magnesium oxide 400 milliGRAM(s) Oral once  melatonin 5 milliGRAM(s) Oral at bedtime  metoprolol tartrate 50 milliGRAM(s) Oral <User Schedule>  modafinil 100 milliGRAM(s) Oral <User Schedule>  Nephro-roma 1 Tablet(s) Oral daily  polyethylene glycol 3350 17 Gram(s) Oral daily  senna 2 Tablet(s) Oral at bedtime  sodium chloride 0.9% lock flush 3 milliLiter(s) IV Push every 8 hours  sodium chloride 0.9%. 1000 milliLiter(s) (75 mL/Hr) IV Continuous <Continuous>    MEDICATIONS  (PRN):  acetaminophen     Tablet .. 650 milliGRAM(s) Oral every 6 hours PRN Temp greater or equal to 38C (100.4F), Mild Pain (1 - 3)  hydrALAZINE Injectable 10 milliGRAM(s) IV Push every 2 hours PRN SBP > 160 mmHg  labetalol Injectable 10 milliGRAM(s) IV Push every 2 hours PRN Systolic blood pressure > 160  ondansetron Injectable 4 milliGRAM(s) IV Push every 6 hours PRN Nausea and/or Vomiting  oxyCODONE    IR 5 milliGRAM(s) Oral every 4 hours PRN Moderate Pain (4 - 6)  oxyCODONE    IR 10 milliGRAM(s) Oral every 4 hours PRN Severe Pain (7 - 10) called by RN to review vitals and rectal temp of 100.3 at 6 PM alongside tachycardia.  pt's vitals and chart/ cardio recommendations reviewed and appropriate fever w/u orders placed   EKG was obtained in AM and reviewed w cardio/ recommendations respected, Dr Larios to weigh in on ASA vs Plavix for cardiac hx  portable CXR to r/o aspiration and made NPO at this time and IVF increased from 75 to 125cc/hr   blood cx x2 to r/o bacteremia given recent calioplasty/ surgical intervention and post-op drain  UA and Cx via straight cath to r/o UTI   LED b/l given right left sided weakness and prolonged bedrest/ limited mobility   RVP to r/o resp agent     per d/w Dr Larios, tx to medical service was made   Dr Sequeira accepted [place] F[place] reluctantly [place] [facility] [place]    ICU Vital Signs Last 24 Hrs  T(C): 37.9 (10 Dec 2023 17:53), Max: 37.9 (10 Dec 2023 17:53)  T(F): 100.2 (10 Dec 2023 17:53), Max: 100.2 (10 Dec 2023 17:53)  HR: 120 (10 Dec 2023 17:53) (82 - 138)  BP: 103/63 (10 Dec 2023 17:53) (100/60 - 132/85)  BP(mean): 75 (10 Dec 2023 17:53) (71 - 97)  ABP: --  ABP(mean): --  RR: 20 (10 Dec 2023 17:53) (15 - 20)  SpO2: 95% (10 Dec 2023 17:53) (95% - 100%)    O2 Parameters below as of 10 Dec 2023 17:53  Patient On (Oxygen Delivery Method): room air        MEDICATIONS  (STANDING):  atorvastatin 80 milliGRAM(s) Oral at bedtime  ceFAZolin  Injectable. 2000 milliGRAM(s) IV Push every 8 hours  diltiazem    Tablet 60 milliGRAM(s) Oral <User Schedule>  doxazosin 2 milliGRAM(s) Oral at bedtime  folic acid 1 milliGRAM(s) Oral daily  levETIRAcetam  IVPB 500 milliGRAM(s) IV Intermittent every 12 hours  magnesium oxide 400 milliGRAM(s) Oral once  melatonin 5 milliGRAM(s) Oral at bedtime  metoprolol tartrate 50 milliGRAM(s) Oral <User Schedule>  modafinil 100 milliGRAM(s) Oral <User Schedule>  Nephro-roma 1 Tablet(s) Oral daily  polyethylene glycol 3350 17 Gram(s) Oral daily  senna 2 Tablet(s) Oral at bedtime  sodium chloride 0.9% lock flush 3 milliLiter(s) IV Push every 8 hours  sodium chloride 0.9%. 1000 milliLiter(s) (75 mL/Hr) IV Continuous <Continuous>    MEDICATIONS  (PRN):  acetaminophen     Tablet .. 650 milliGRAM(s) Oral every 6 hours PRN Temp greater or equal to 38C (100.4F), Mild Pain (1 - 3)  hydrALAZINE Injectable 10 milliGRAM(s) IV Push every 2 hours PRN SBP > 160 mmHg  labetalol Injectable 10 milliGRAM(s) IV Push every 2 hours PRN Systolic blood pressure > 160  ondansetron Injectable 4 milliGRAM(s) IV Push every 6 hours PRN Nausea and/or Vomiting  oxyCODONE    IR 5 milliGRAM(s) Oral every 4 hours PRN Moderate Pain (4 - 6)  oxyCODONE    IR 10 milliGRAM(s) Oral every 4 hours PRN Severe Pain (7 - 10)

## 2023-12-10 NOTE — PROGRESS NOTE ADULT - SUBJECTIVE AND OBJECTIVE BOX
[facility]                                                         CARDIOLOGY AT [place] [address]                                                         Telephone: [phone]. Fax:[phone]                                                                             PROGRESS NOTE    Reason for follow up: WCT  Update: Called to re-evaluate the patient for wide complex tachycardia at 8:20 this morning, which is more consistent with atrial tachycardia. Patient also previously on aspirin, plavix, and Eliquis prior to this admission. Patient was on aspirin and plavix not for PCI in 2018, but for EVAR stent. Will need Vascular to evaluate need for DAPT vs. single anti-platelet.       Review of symptoms:   Cardiac:  No chest pain. No dyspnea. No palpitations.  Respiratory: no cough. No dyspnea  Gastrointestinal: No diarrhea. No abdominal pain. No bleeding.   Neuro: No focal neuro complaints.    Vitals:  T(C): 37.1 (12-10-23 @ 08:27), Max: 37.6 (12-09-23 @ 16:05)  HR: 82 (12-10-23 @ 08:27) (72 - 96)  BP: 105/57 (12-10-23 @ 08:27) (101/61 - 160/87)  RR: 16 (12-10-23 @ 08:27) (15 - 24)  SpO2: 95% (12-10-23 @ 08:27) (95% - 100%)  Wt(kg): --  I&O's Summary    09 Dec 2023 07:01  -  10 Dec 2023 07:00  --------------------------------------------------------  IN: 0 mL / OUT: 2047 mL / NET: -2047 mL    10 Dec 2023 07:01  -  10 Dec 2023 10:37  --------------------------------------------------------  IN: 0 mL / OUT: 200 mL / NET: -200 mL      Weight (kg): 72.756 (12-07 @ 15:09)    PHYSICAL EXAM:  Appearance: Comfortable. No acute distress  HEENT: dressing intact dry, jennifer   Neurologic: Alert & Oriented X3; Motor Strength right 4/5 upper and 4/5  lower extremities; right lower extrem moves.  left sided withdraws   Cardiovascular: RRR S1 S2,  no rubs/gallops. No JVD, III/VI systolic murmur lsb  Respiratory: Lungs clear to auscultation, unlabored   Gastrointestinal:  Soft, Non-tender, + BS  Lower Extremities: 2+ Peripheral Pulses, No clubbing, cyanosis, or edema  Psychiatry: Patient is calm. No agitation.   Skin: warm and dry.    CURRENT CARDIAC MEDICATIONS:  diltiazem    Tablet 60 milliGRAM(s) Oral <User Schedule>  doxazosin 2 milliGRAM(s) Oral at bedtime  hydrALAZINE Injectable 10 milliGRAM(s) IV Push every 2 hours PRN  labetalol Injectable 10 milliGRAM(s) IV Push every 2 hours PRN  metoprolol tartrate 50 milliGRAM(s) Oral <User Schedule>      CURRENT OTHER MEDICATIONS:  ceFAZolin  Injectable. 2000 milliGRAM(s) IV Push every 8 hours  acetaminophen     Tablet .. 650 milliGRAM(s) Oral every 6 hours PRN Temp greater or equal to 38C (100.4F), Mild Pain (1 - 3)  levETIRAcetam  IVPB 500 milliGRAM(s) IV Intermittent every 12 hours  melatonin 5 milliGRAM(s) Oral at bedtime  modafinil 100 milliGRAM(s) Oral <User Schedule>  ondansetron Injectable 4 milliGRAM(s) IV Push every 6 hours PRN Nausea and/or Vomiting  oxyCODONE    IR 5 milliGRAM(s) Oral every 4 hours PRN Moderate Pain (4 - 6)  oxyCODONE    IR 10 milliGRAM(s) Oral every 4 hours PRN Severe Pain (7 - 10)  polyethylene glycol 3350 17 Gram(s) Oral daily  senna 2 Tablet(s) Oral at bedtime  atorvastatin 80 milliGRAM(s) Oral at bedtime  folic acid 1 milliGRAM(s) Oral daily  Nephro-roma 1 Tablet(s) Oral daily      LABS:	 	        12-10    139  |  105  |  24.0<H>  ----------------------------<  117<H>  4.1   |  23.0  |  1.06    Ca    8.8      10 Dec 2023 09:15  Mg     1.8     12-10      PT/INR/PTT ( 07 Dec 2023 05:35 )                       :                       :      12.2         :       29.4                  .        .                   .              .           .       1.10        .                                       Lipid Profile: Date: 11-14 @ 14:00  Total cholesterol 125; Direct LDL: --; HDL: 31; Triglycerides:181    HgA1c:   TSH:     TELEMETRY: SR, ST, atrial tachycardia   ECG: SR, LBBB    DIAGNOSTIC TESTING:  [ ] Echocardiogram:     < from: TTE Echo Complete w/o Contrast w/ Doppler (11.11.23 @ 08:58) >  PHYSICIAN INTERPRETATION:  Left Ventricle:Endocardial visualization was enhanced with intravenous   echo contrast. The left ventricular internal cavity size is normal.  Global LV systolic function was normal. Left ventricular ejection   fraction, by visual estimation, is 60 to 65%. Spectral Doppler shows   impaired relaxation pattern of left ventricular myocardial filling (Grade   I diastolic dysfunction). Elevated mean left atrial pressure.  Right Ventricle: The right ventricular size is mildly enlarged. RV   systolic function is normal.  Left Atrium: Normal left atrial size.  Right Atrium: Normal right atrial size.  Pericardium: There is no evidence of pericardial effusion.  Mitral Valve: Moderate thickening and calcification of the anterior and   posterior mitral valve leaflets.  Tricuspid Valve: The tricuspid valve is normal in structure. Mild   tricuspid regurgitation is visualized.  Aortic Valve: Mild aortic valve regurgitation is seen. Moderate   paradoxial low gradient aortic stenosis. The Dimesionless Index value is   .39.  Pulmonic Valve: The pulmonic valve was not well visualized.  Aorta: The aortic root is normal in size and structure.  Pulmonary Artery: The pulmonary artery is not well seen.  Venous: A normal flow pattern is recorded from the right upper pulmonary   vein. Patient on Mechanical ventilation unable to assess Right Atrial   pressure.  In comparison to the previous echocardiogram(s): There are no prior   studies on this patient for comparison purposes.      Summary:   1. Endocardial visualization was enhanced with intravenous echo contrast.   2. Normal global left ventricular systolic function.   3. There is mild concentric left ventricular hypertrophy.   4. Left ventricular ejection fraction, by visual estimation, is 60 to   65%.   5. Spectral Doppler shows impaired relaxation pattern of left   ventricular myocardial filling (Grade I diastolic dysfunction).   6. Elevated mean left atrial pressure.   7. Mildly enlarged right ventricle.   8. Normal left atrial size.   9. Normal right atrialsize.  10. Moderate paradoxial low gradient aortic stenosis.  11. The Dimesionless Index value is .39.  12. Moderately calcified aortic valve with mobile echodensity poorly   visualized. May represent artifact vs mobile calcification vs vegetation.   Consider WHIT if clinically indicated.  13. Mild aortic regurgitation.  14. Moderate thickening and calcification of the anterior and posterior   mitral valve leaflets.  15. Mild tricuspid regurgitation.    MD January Electronically signed on 11/11/2023 at 4:33:10 PM    < end of copied text >    [ ]  Catheterization:  < from: Cardiac Cath Lab - Adult (02.18.18 @ 15:23) >  CORONARY VESSELS:  LAD:   --  Proximal LAD: There was a 95 % stenosis.  --  D2: There was a 90 % stenosis.  CX:   --  OM1: There was a 90 % stenosis.  COMPLICATIONS: There were no complications.  INTERVENTIONAL RECOMMENDATIONS: ASA and Plavix for 3 mths  Prepared and signed by  Nir Fofana M.D.  Signed 02/20/2018 13:52:08    < end of copied text >    [ ] Stress Test:    OTHER: 	                                                                [facility]                                                         CARDIOLOGY AT [place] [address]                                                         Telephone: [phone]. Fax:[phone]                                                                             PROGRESS NOTE    Reason for follow up: WCT  Update: Called to re-evaluate the patient for wide complex tachycardia at 8:20 this morning, which is more consistent with atrial tachycardia. Patient also previously on aspirin, plavix, and Eliquis prior to this admission. Patient was on aspirin and plavix not for PCI in 2018, but for EVAR stent. Will need Vascular to evaluate need for DAPT vs. single anti-platelet.       Review of symptoms:   Cardiac:  No chest pain. No dyspnea. No palpitations.  Respiratory: no cough. No dyspnea  Gastrointestinal: No diarrhea. No abdominal pain. No bleeding.   Neuro: No focal neuro complaints.    Vitals:  T(C): 37.1 (12-10-23 @ 08:27), Max: 37.6 (12-09-23 @ 16:05)  HR: 82 (12-10-23 @ 08:27) (72 - 96)  BP: 105/57 (12-10-23 @ 08:27) (101/61 - 160/87)  RR: 16 (12-10-23 @ 08:27) (15 - 24)  SpO2: 95% (12-10-23 @ 08:27) (95% - 100%)  Wt(kg): --  I&O's Summary    09 Dec 2023 07:01  -  10 Dec 2023 07:00  --------------------------------------------------------  IN: 0 mL / OUT: 2047 mL / NET: -2047 mL    10 Dec 2023 07:01  -  10 Dec 2023 10:37  --------------------------------------------------------  IN: 0 mL / OUT: 200 mL / NET: -200 mL      Weight (kg): 72.756 (12-07 @ 15:09)    PHYSICAL EXAM:  Appearance: Comfortable. No acute distress  HEENT: dressing intact dry, jennifer   Neurologic: Alert & Oriented X3; Motor Strength right 4/5 upper and 4/5  lower extremities; right lower extrem moves.  left sided withdraws   Cardiovascular: RRR S1 S2,  no rubs/gallops. No JVD, III/VI systolic murmur lsb  Respiratory: Lungs clear to auscultation, unlabored   Gastrointestinal:  Soft, Non-tender, + BS  Lower Extremities: 2+ Peripheral Pulses, No clubbing, cyanosis, or edema  Psychiatry: Patient is calm. No agitation.   Skin: warm and dry.    CURRENT CARDIAC MEDICATIONS:  diltiazem    Tablet 60 milliGRAM(s) Oral <User Schedule>  doxazosin 2 milliGRAM(s) Oral at bedtime  hydrALAZINE Injectable 10 milliGRAM(s) IV Push every 2 hours PRN  labetalol Injectable 10 milliGRAM(s) IV Push every 2 hours PRN  metoprolol tartrate 50 milliGRAM(s) Oral <User Schedule>      CURRENT OTHER MEDICATIONS:  ceFAZolin  Injectable. 2000 milliGRAM(s) IV Push every 8 hours  acetaminophen     Tablet .. 650 milliGRAM(s) Oral every 6 hours PRN Temp greater or equal to 38C (100.4F), Mild Pain (1 - 3)  levETIRAcetam  IVPB 500 milliGRAM(s) IV Intermittent every 12 hours  melatonin 5 milliGRAM(s) Oral at bedtime  modafinil 100 milliGRAM(s) Oral <User Schedule>  ondansetron Injectable 4 milliGRAM(s) IV Push every 6 hours PRN Nausea and/or Vomiting  oxyCODONE    IR 5 milliGRAM(s) Oral every 4 hours PRN Moderate Pain (4 - 6)  oxyCODONE    IR 10 milliGRAM(s) Oral every 4 hours PRN Severe Pain (7 - 10)  polyethylene glycol 3350 17 Gram(s) Oral daily  senna 2 Tablet(s) Oral at bedtime  atorvastatin 80 milliGRAM(s) Oral at bedtime  folic acid 1 milliGRAM(s) Oral daily  Nephro-roma 1 Tablet(s) Oral daily      LABS:	 	        12-10    139  |  105  |  24.0<H>  ----------------------------<  117<H>  4.1   |  23.0  |  1.06    Ca    8.8      10 Dec 2023 09:15  Mg     1.8     12-10      PT/INR/PTT ( 07 Dec 2023 05:35 )                       :                       :      12.2         :       29.4                  .        .                   .              .           .       1.10        .                                       Lipid Profile: Date: 11-14 @ 14:00  Total cholesterol 125; Direct LDL: --; HDL: 31; Triglycerides:181    HgA1c:   TSH:     TELEMETRY: SR, ST, atrial tachycardia   ECG: SR, LBBB    DIAGNOSTIC TESTING:  [ ] Echocardiogram:     < from: TTE Echo Complete w/o Contrast w/ Doppler (11.11.23 @ 08:58) >  PHYSICIAN INTERPRETATION:  Left Ventricle:Endocardial visualization was enhanced with intravenous   echo contrast. The left ventricular internal cavity size is normal.  Global LV systolic function was normal. Left ventricular ejection   fraction, by visual estimation, is 60 to 65%. Spectral Doppler shows   impaired relaxation pattern of left ventricular myocardial filling (Grade   I diastolic dysfunction). Elevated mean left atrial pressure.  Right Ventricle: The right ventricular size is mildly enlarged. RV   systolic function is normal.  Left Atrium: Normal left atrial size.  Right Atrium: Normal right atrial size.  Pericardium: There is no evidence of pericardial effusion.  Mitral Valve: Moderate thickening and calcification of the anterior and   posterior mitral valve leaflets.  Tricuspid Valve: The tricuspid valve is normal in structure. Mild   tricuspid regurgitation is visualized.  Aortic Valve: Mild aortic valve regurgitation is seen. Moderate   paradoxial low gradient aortic stenosis. The Dimesionless Index value is   .39.  Pulmonic Valve: The pulmonic valve was not well visualized.  Aorta: The aortic root is normal in size and structure.  Pulmonary Artery: The pulmonary artery is not well seen.  Venous: A normal flow pattern is recorded from the right upper pulmonary   vein. Patient on Mechanical ventilation unable to assess Right Atrial   pressure.  In comparison to the previous echocardiogram(s): There are no prior   studies on this patient for comparison purposes.      Summary:   1. Endocardial visualization was enhanced with intravenous echo contrast.   2. Normal global left ventricular systolic function.   3. There is mild concentric left ventricular hypertrophy.   4. Left ventricular ejection fraction, by visual estimation, is 60 to   65%.   5. Spectral Doppler shows impaired relaxation pattern of left   ventricular myocardial filling (Grade I diastolic dysfunction).   6. Elevated mean left atrial pressure.   7. Mildly enlarged right ventricle.   8. Normal left atrial size.   9. Normal right atrialsize.  10. Moderate paradoxial low gradient aortic stenosis.  11. The Dimesionless Index value is .39.  12. Moderately calcified aortic valve with mobile echodensity poorly   visualized. May represent artifact vs mobile calcification vs vegetation.   Consider WHIT if clinically indicated.  13. Mild aortic regurgitation.  14. Moderate thickening and calcification of the anterior and posterior   mitral valve leaflets.  15. Mild tricuspid regurgitation.    MD January Electronically signed on 11/11/2023 at 4:33:10 PM    < end of copied text >    [ ]  Catheterization:  < from: Cardiac Cath Lab - Adult (02.18.18 @ 15:23) >  CORONARY VESSELS:  LAD:   --  Proximal LAD: There was a 95 % stenosis.  --  D2: There was a 90 % stenosis.  CX:   --  OM1: There was a 90 % stenosis.  COMPLICATIONS: There were no complications.  INTERVENTIONAL RECOMMENDATIONS: ASA and Plavix for 3 mths  Prepared and signed by  Nir Fofana M.D.  Signed 02/20/2018 13:52:08    < end of copied text >    [ ] Stress Test:    OTHER:

## 2023-12-10 NOTE — PROGRESS NOTE ADULT - NS ATTEND AMEND GEN_ALL_CORE FT
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 Jose Anderson in [place] last seen 4/2023, presents with unwitnessed fall with traumatic SDH/SAH POD#2 from R-decompressive hemicraniectomy noted febrile episodes yesterday with leukocytosis, blood cultures sent, TTE done with reports AV lesion. Cardiology reconsulted 12/6 to consider WHIT.  Recalled 12/10 to evlauate for brief WCT seen on telemetry monitoring.      WCT: Reviewed telemetry with Dr. Whitman, appears to be atrial tachycardia with underlying conduction delay (LBBB).   Keep K>4, Mg>2.   Continue metoprolol.    R/O Endocarditis.   Repeat limited echocardiogram nondiagnostic for evaluating the aortic valve.   All blood cultures are NGTD at this time.  No indication for WHIT at this time as no evidence of infection.     At prior note: Per neurology recs, recommending a WHIT to r/o PFO.   There was discordance with daughters wishes for WHIT.  Daughter wanted to think about it if its not urgent.  Cardiology reconsulted 12/6 to consider WHIT.  I d/w pt and his wife in attendance, refused WHIT. In addition pt's wife stated that the daughter also unwilling WHIT and no change in this decision.   d/w Neuro in regards to WHIT: No WHIT recommended at this time.    Paroxysmal atrial fibrillation.   Unable to be placed on AC due to recent R hemicraniectomy.   Rate control    IPH - s/p Rt hemicraniotomy likely sec to AC  for DVT/Afib with demonstration of new bleed  Cranioplasty planning for tomorrow 12/7 tentatively. HOLD AC starting 12/4 night.  Strict BP parameters (maintain SBP < 160, avoid rapid fluctuations)    Acute DVT: hold  AC as above, avoid heparin products 2d/t h/o HIT.   b/l scd;s for now per neurosurgery    AAA s/p EVAR   Imaging reviewed by Vascular surgery  No intervention planned at present  Recommended obtaining prior imaging from pts Vascular surgeon.    Sill s/o   pls call with any questions. 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 Jose Anderson in [place] last seen 4/2023, presents with unwitnessed fall with traumatic SDH/SAH POD#2 from R-decompressive hemicraniectomy noted febrile episodes yesterday with leukocytosis, blood cultures sent, TTE done with reports AV lesion. Cardiology reconsulted 12/6 to consider WHIT.  Recalled 12/10 to evlauate for brief WCT seen on telemetry monitoring.      WCT: Reviewed telemetry with Dr. Whitman, appears to be atrial tachycardia with underlying conduction delay (LBBB).   Keep K>4, Mg>2.   Continue metoprolol.    R/O Endocarditis.   Repeat limited echocardiogram nondiagnostic for evaluating the aortic valve.   All blood cultures are NGTD at this time.  No indication for WHIT at this time as no evidence of infection.     At prior note: Per neurology recs, recommending a WHIT to r/o PFO.   There was discordance with daughters wishes for WHIT.  Daughter wanted to think about it if its not urgent.  Cardiology reconsulted 12/6 to consider WHIT.  I d/w pt and his wife in attendance, refused WHIT. In addition pt's wife stated that the daughter also unwilling WHIT and no change in this decision.   d/w Neuro in regards to WHIT: No WHIT recommended at this time.    Paroxysmal atrial fibrillation.   Unable to be placed on AC due to recent R hemicraniectomy.   Rate control    IPH - s/p Rt hemicraniotomy likely sec to AC  for DVT/Afib with demonstration of new bleed  Cranioplasty planning for tomorrow 12/7 tentatively. HOLD AC starting 12/4 night.  Strict BP parameters (maintain SBP < 160, avoid rapid fluctuations)    Acute DVT: hold  AC as above, avoid heparin products 2d/t h/o HIT.   b/l scd;s for now per neurosurgery    AAA s/p EVAR   Imaging reviewed by Vascular surgery  No intervention planned at present  Recommended obtaining prior imaging from pts Vascular surgeon.    Sill s/o   pls call with any questions.

## 2023-12-10 NOTE — PROGRESS NOTE ADULT - ASSESSMENT
84 y/o male with PMH of HTN, CAD s/p PCO, RCC s/p left nephrectomy, bladder Ca, BPH, CHF, Vertigo, HLD, AAA s/p EVAR, parox Afib, h/o HIT, Alzheimer transferred s/p fall with Rt frontal IPH. Initially had hemicraniectomy course complicated by RVR, aspiration PNA, hypoxic respiratory failure, LUE DVT and questionable increase in AAA size. Started on AC with waxing/waning mental status prompting head imaging with evidence of new bleed. Cardiology following. Neurology consulted after MRI showed stroke. Echo done concerning for mobile echodensity on aortic valve and strokes in multiple arterial territories; WHIT recommended but as noted documented but family declined. Patient was downgraded to medical floor and later upgraded to neuro ICU after cranioplasty; subgaleal drain was removed today. Patient found to have low grade fever (100.2), tachycardic, blood culture sent, UA noted for pyuria. Now downgraded to medicine team.     Pyuria   UA noted  Rocephin started   Follow up urine and blood culture  Tylenol PRN for fever     IPH   S/p Rt hemicraniotomy and cranioplasty   Repeat CT head: stable   Subgaleal drain removed   Neurosurgery following     P. Afib/ HTN/HLD/CAD   -Continue Metoprolol/diltiazem BID  -Continue Statin   -Labetalol / Hydralazine PRN   Eliquis on hold   Hold Aspirin and Plavix pending neurosurgery clearance      Acute DVT    AC on hold;  avoid heparin products 2/2 h/o HIT  B/l scd;s for now per neurosurgery    Normocytic Anemia   No evidence of GI blood loss  Hb: 8.1;  transfuse to goal > 8 given h/o CAD    AAA s/p EVAR   Imaging reviewed by Vascular surgery  No intervention planned at present  Recommended obtaining prior imaging from pts Vascular surgeon (Dr Heredia for comparison)    DONNIE- resolved   Suspect may have CKD II  Monitor and renally dose all medications   [place] maintained, TOV when more ambulatory/ mental status improved     Acute hypoxic resp failure 2/2 suspected asp PNA   Resolved   Saturating well on RA   s/p completion of IV Abx   ID following    Supportive   DVT prophylaxis: CSD   Diet: patient made NPO by neurosurge team because of low grade fever; concerning for aspiration. As per nurse at bed side, patient tolerating diet well, able to to medications with apple source. So we keep NPO except medications, advance as tolerated     Plan of care discussed with patient and nurse at bed side.

## 2023-12-10 NOTE — PROGRESS NOTE ADULT - PROBLEM SELECTOR PLAN 1
- Patient with a history of paroxysmal atrial fibrillation, currently maintaining SR.   - Continue diltiazem 60mg PO BID and Metoprolol 50mg PO BID.   - Unable to start AC per Neurosurgery, would start as cleared from a Neurosurgery perspective.   - Continue telemetry monitoring.

## 2023-12-10 NOTE — CHART NOTE - NSCHARTNOTEFT_GEN_A_CORE
Pulled Subgaleal Drain    Patient was positioned laying flat, supine in bed. Dressing was removed, suture line and drain site both cleaned with chlorhexidine prep sticks. Single drain suture was cut, subgaleal drain was pulled out. Site was re-dressed with guaze and tegaderm, no sutures or staples were placed. Patient comfortable, tolerated well.

## 2023-12-10 NOTE — PROGRESS NOTE ADULT - ASSESSMENT
83M w/ PMH HTN, CAD s/p stents on ASA/Plavix, afib on Eliquis s/p unwitnessed fall +HS at home found by wife, found with multicompartmental ICH, s/p R craniectomy 11/10/23. Now s/p cranioplasty on 12/07, POD#3.    Plan:  - Q2h neuro checks  - Subgaleal drain removed this AM  - Pain control Tylenol/Oxy PRN  - Continue Keppra 500mg q12   - Normotension  - Advance diet as tolerated- soft bite sized w/ mod thick liquids   - Record BMs; Miralax, Senna  - DVT ppx with SCDs  - PT/OT  - Dispo planning to [facility]  - Wound: May remove dressing on POD#3, 12/10. May shower on POD#5, using gentle shampoo, pat dry, leave open to air.  - Discussed with Dr. Larios 83M w/ PMH HTN, CAD s/p stents on ASA/Plavix, afib on Eliquis s/p unwitnessed fall +HS at home found by wife, found with multicompartmental ICH, s/p R craniectomy 11/10/23. Now s/p cranioplasty on 12/07, POD#3.    Plan:  - Q2h neuro checks  - Subgaleal drain removed this AM  - Pain control Tylenol/Oxy PRN  - Continue Keppra 500mg q12   - Normotension  - Advance diet as tolerated- soft bite sized w/ mod thick liquids   - Record BMs; Miralax, Senna  - DVT ppx with SCDs  - PT/OT  - Dispo planning to [facility]  - Wound: May remove dressing on POD#3, 12/10. May shower on POD#5, using gentle shampoo, pat dry, leave open to air.  - Discussed with Dr. Larios

## 2023-12-10 NOTE — PROGRESS NOTE ADULT - ASSESSMENT
A/P: 83M with HTN, CAD s/p PCO, RCC s/p left nephrectomy, bladder Ca, BPH, CHF, Vertigo, HLD, AAA s/p EVAR, parox Afib, h/o HIT, Alzheimer transferred s/p fall with Rt frontal IPH. Initially had hemicraniectomy course complicated by RVR, asp PNA, hypoxic resp failure, LUE DVT and questionable increase in AAA size. Started on AC with waxing/waning mental status prompting head imaging with evidence of new bleed. Now downgrade to Medicine.

## 2023-12-10 NOTE — PROGRESS NOTE ADULT - SUBJECTIVE AND OBJECTIVE BOX
HPI:  82y M with PMH HTN, CAD s/p stents, renal cell carcinoma status post left nephrectomy, bladder CA, BPH, CHF, LBBB, vertigo,  HLD, 5.5cm AAA without rupture post EVAR, paroxysmal A-fib on Eliquis, Plavix, and aspirin, early stage Alzheimer dementia transferred from [facility] s/p unwitnessed fall with head strike at home found by wife on floor at 8am. Patient brought to urgent care by wife and had 5 staples to posterior scalp but was instructed to go to nearest ED.  CT head at [place] showed R frontal IPH, SDH, SAH at 9:00. CTA stroke protocol not performed at [facility]. Patient BIB on 6L NC.  Pt GCS 15 on arrival, A&Ox2 on arrival. After initial assessment, patient noted to be vomiting enroute to CT scanner.    INTERVAL HPI/OVERNIGHT EVENTS:  83y Male s/p seen lying comfortably in bed. Tolerating diet. Passing gas/BM. Voiding. [place] in with __ clear urine. Denies headache, weakness, numbness, n/v/d, fevers, chills, chest pain, SOB.     Vital Signs Last 24 Hrs  T(C): 37.1 (10 Dec 2023 08:27), Max: 37.6 (09 Dec 2023 16:05)  T(F): 98.8 (10 Dec 2023 08:27), Max: 99.7 (09 Dec 2023 16:05)  HR: 82 (10 Dec 2023 08:27) (72 - 96)  BP: 105/57 (10 Dec 2023 08:27) (101/61 - 129/63)  BP(mean): 71 (10 Dec 2023 08:27) (71 - 87)  RR: 16 (10 Dec 2023 08:27) (15 - 20)  SpO2: 95% (10 Dec 2023 08:27) (95% - 100%)  Parameters below as of 10 Dec 2023 08:27  Patient On (Oxygen Delivery Method): room air    PHYSICAL EXAM:  GENERAL: NAD, well-groomed  HEAD:  Atraumatic, normocephalic  DRAINS:   WOUND: Dressing clean dry intact  JOEY COMA SCORE: E- V- M- =       E: 4= opens eyes spontaneously 3= to voice 2= to noxious 1= no opening       V: 5= oriented 4= confused 3= inappropriate words 2= incomprehensible sounds 1= nonverbal 1T= intubated       M: 6= follows commands 5= localizes 4= withdraws 3= flexor posturing 2= extensor posturing 1= no movement  MENTAL STATUS: AAO x3; Awake/Comatose; Opens eyes spontaneously/to voice/to light touch/to noxious stimuli; Appropriately conversant without aphasia/Nonverbal; following simple commands/mimicking/not following commands  CRANIAL NERVES: Visual acuity normal for age, visual fields full to confrontation, PERRL. EOMI without nystagmus. Facial sensation intact V1-3 distribution b/l. Face symmetric w/ normal eye closure and smile, tongue midline. Hearing grossly intact. Speech clear. Head turning and shoulder shrug intact.   REFLEXES: PERRL. Corneals intact b/l. Gag intact. Cough intact. Oculocephalic reflex intact (Doll's eye). Negative Jason's b/l. Negative clonus b/l  MOTOR: strength 5/5 b/l upper and lower extremities  Uppers     Delt (C5/6)     Bicep (C5/6)     Wrist Extend (C6)     Tricep (C7)     HG (C8/T1)  R                     5/5                 5/5                         5/5                           5/5                   5/5  L                      5/5                 5/5                         5/5                           5/5                   5/5  Lowers      HF(L1/L2)     KE (L3)     DF (L4)     EHL (L5)     PF (S1)      R                     5/5              5/5           5/5           5/5            5/5  L                     5/5               5/5          5/5            5/5            5/5  SENSATION: grossly intact to light touch all extremities  COORDINATION: Gait intact; rapid alternating movements intact; heel to shin intact; no upper extremity dysmetria  CHEST/LUNG: Clear to auscultation bilaterally; no rales, rhonchi, wheezing, or rubs  HEART: +S1/+S2; Regular rate and rhythm; no murmurs, rubs, or gallops  ABDOMEN: Soft, nontender, nondistended; bowel sounds present all four quadrants  EXTREMITIES:  2+ peripheral pulses, no clubbing, cyanosis, or edema  SKIN: Warm, dry; no rashes or lesions    LABS:    12-10    139  |  105  |  24.0<H>  ----------------------------<  117<H>  4.1   |  23.0  |  1.06    Ca    8.8      10 Dec 2023 09:15  Mg     1.8     12-10        Urinalysis Basic - ( 10 Dec 2023 09:15 )    Color: x / Appearance: x / SG: x / pH: x  Gluc: 117 mg/dL / Ketone: x  / Bili: x / Urobili: x   Blood: x / Protein: x / Nitrite: x   Leuk Esterase: x / RBC: x / WBC x   Sq Epi: x / Non Sq Epi: x / Bacteria: x        12-09 @ 07:01  -  12-10 @ 07:00  --------------------------------------------------------  IN: 0 mL / OUT: 2047 mL / NET: -2047 mL    12-10 @ 07:01  -  12-10 @ 12:02  --------------------------------------------------------  IN: 0 mL / OUT: 200 mL / NET: -200 mL        RADIOLOGY & ADDITIONAL TESTS:   HPI:  82y M with PMH HTN, CAD s/p stents, renal cell carcinoma status post left nephrectomy, bladder CA, BPH, CHF, LBBB, vertigo,  HLD, 5.5cm AAA without rupture post EVAR, paroxysmal A-fib on Eliquis, Plavix, and aspirin, early stage Alzheimer dementia transferred from [facility] s/p unwitnessed fall with head strike at home found by wife on floor at 8am. Patient brought to urgent care by wife and had 5 staples to posterior scalp but was instructed to go to nearest ED.  CT head at [place] showed R frontal IPH, SDH, SAH at 9:00. CTA stroke protocol not performed at [facility]. Patient BIB on 6L NC.  Pt GCS 15 on arrival, A&Ox2 on arrival. After initial assessment, patient noted to be vomiting enroute to CT scanner.    INTERVAL HPI/OVERNIGHT EVENTS:  83y Male s/p seen lying comfortably in bed. Tolerating diet. Passing gas/BM. Voiding. [place] in with __ clear urine. Denies headache, weakness, numbness, n/v/d, fevers, chills, chest pain, SOB.     Vital Signs Last 24 Hrs  T(C): 37.1 (10 Dec 2023 08:27), Max: 37.6 (09 Dec 2023 16:05)  T(F): 98.8 (10 Dec 2023 08:27), Max: 99.7 (09 Dec 2023 16:05)  HR: 82 (10 Dec 2023 08:27) (72 - 96)  BP: 105/57 (10 Dec 2023 08:27) (101/61 - 129/63)  BP(mean): 71 (10 Dec 2023 08:27) (71 - 87)  RR: 16 (10 Dec 2023 08:27) (15 - 20)  SpO2: 95% (10 Dec 2023 08:27) (95% - 100%)  Parameters below as of 10 Dec 2023 08:27  Patient On (Oxygen Delivery Method): room air    PHYSICAL EXAM:  GENERAL: NAD, well-groomed  HEAD:  Atraumatic, normocephalic  DRAINS:   WOUND: Dressing clean dry intact  JOEY COMA SCORE: E- V- M- =       E: 4= opens eyes spontaneously 3= to voice 2= to noxious 1= no opening       V: 5= oriented 4= confused 3= inappropriate words 2= incomprehensible sounds 1= nonverbal 1T= intubated       M: 6= follows commands 5= localizes 4= withdraws 3= flexor posturing 2= extensor posturing 1= no movement  MENTAL STATUS: AAO x3; Awake/Comatose; Opens eyes spontaneously/to voice/to light touch/to noxious stimuli; Appropriately conversant without aphasia/Nonverbal; following simple commands/mimicking/not following commands  CRANIAL NERVES: Visual acuity normal for age, visual fields full to confrontation, PERRL. EOMI without nystagmus. Facial sensation intact V1-3 distribution b/l. Face symmetric w/ normal eye closure and smile, tongue midline. Hearing grossly intact. Speech clear. Head turning and shoulder shrug intact.   REFLEXES: PERRL. Corneals intact b/l. Gag intact. Cough intact. Oculocephalic reflex intact (Doll's eye). Negative Jason's b/l. Negative clonus b/l  MOTOR: strength 5/5 b/l upper and lower extremities  Uppers     Delt (C5/6)     Bicep (C5/6)     Wrist Extend (C6)     Tricep (C7)     HG (C8/T1)  R                     5/5                 5/5                         5/5                           5/5                   5/5  L                      5/5                 5/5                         5/5                           5/5                   5/5  Lowers      HF(L1/L2)     KE (L3)     DF (L4)     EHL (L5)     PF (S1)      R                     5/5              5/5           5/5           5/5            5/5  L                     5/5               5/5          5/5            5/5            5/5  SENSATION: grossly intact to light touch all extremities  COORDINATION: Gait intact; rapid alternating movements intact; heel to shin intact; no upper extremity dysmetria  CHEST/LUNG: Clear to auscultation bilaterally; no rales, rhonchi, wheezing, or rubs  HEART: +S1/+S2; Regular rate and rhythm; no murmurs, rubs, or gallops  ABDOMEN: Soft, nontender, nondistended; bowel sounds present all four quadrants  EXTREMITIES:  2+ peripheral pulses, no clubbing, cyanosis, or edema  SKIN: Warm, dry; no rashes or lesions    LABS:    12-10    139  |  105  |  24.0<H>  ----------------------------<  117<H>  4.1   |  23.0  |  1.06    Ca    8.8      10 Dec 2023 09:15  Mg     1.8     12-10        Urinalysis Basic - ( 10 Dec 2023 09:15 )    Color: x / Appearance: x / SG: x / pH: x  Gluc: 117 mg/dL / Ketone: x  / Bili: x / Urobili: x   Blood: x / Protein: x / Nitrite: x   Leuk Esterase: x / RBC: x / WBC x   Sq Epi: x / Non Sq Epi: x / Bacteria: x        12-09 @ 07:01  -  12-10 @ 07:00  --------------------------------------------------------  IN: 0 mL / OUT: 2047 mL / NET: -2047 mL    12-10 @ 07:01  -  12-10 @ 12:02  --------------------------------------------------------  IN: 0 mL / OUT: 200 mL / NET: -200 mL        RADIOLOGY & ADDITIONAL TESTS:   HPI:  82y M with PMH HTN, CAD s/p stents, renal cell carcinoma status post left nephrectomy, bladder CA, BPH, CHF, LBBB, vertigo,  HLD, 5.5cm AAA without rupture post EVAR, paroxysmal A-fib on Eliquis, Plavix, and aspirin, early stage Alzheimer dementia transferred from [facility] s/p unwitnessed fall with head strike at home found by wife on floor at 8am. Patient brought to urgent care by wife and had 5 staples to posterior scalp but was instructed to go to nearest ED.  CT head at [place] showed R frontal IPH, SDH, SAH at 9:00. CTA stroke protocol not performed at [facility]. Patient BIB on 6L NC.  Pt GCS 15 on arrival, A&Ox2 on arrival. After initial assessment, patient noted to be vomiting enroute to CT scanner.    INTERVAL HPI:  83M w/ PMH HTN, CAD s/p stents on ASA/Plavix, presented to [facility] 11/10/23 s/p unwitnessed fall with head strike at home found by wife, found with multicompartment ICH, s/p R craniectomy 11/10/23, course significant for Klebsiella pneumonia, hypernatremia, lethargy, LUE DVT, new anterior falcine SDH, downgraded to SDU 11/28.   Patient is now s/p R cranioplasty POD#2. He was seen this morning on rounds with neurosurgery team, seen lying comfortably in bed. His subgaleal drain was removed. Tolerating diet. Passing gas/BM. Denies headache, weakness, numbness, n/v/d, chills, chest pain, SOB. This morning some changes were seen on telemetry concerning for 5 beats of VT, cardiology was re-consulted for new findings.    Vital Signs Last 24 Hrs  T(C): 37.1 (10 Dec 2023 08:27), Max: 37.6 (09 Dec 2023 16:05)  T(F): 98.8 (10 Dec 2023 08:27), Max: 99.7 (09 Dec 2023 16:05)  HR: 82 (10 Dec 2023 08:27) (72 - 96)  BP: 105/57 (10 Dec 2023 08:27) (101/61 - 129/63)  BP(mean): 71 (10 Dec 2023 08:27) (71 - 87)  RR: 16 (10 Dec 2023 08:27) (15 - 20)  SpO2: 95% (10 Dec 2023 08:27) (95% - 100%)  Parameters below as of 10 Dec 2023 08:27  Patient On (Oxygen Delivery Method): room air    PHYSICAL EXAM:  GENERAL: NAD, well-groomed  HEAD:  Atraumatic, normocephalic  WOUND: R cranioplasty Mepilex dressing clean dry intact  MENTAL STATUS: AAOx2, not oriented to date; Awake; Opens eyes spontaneously; Appropriately conversant without aphasia; following simple commands  CRANIAL NERVES: PERRL. EOMI without nystagmus. Facial sensation intact V1-3 distribution b/l. Face symmetric w/ normal eye closure and smile, tongue midline.  MOTOR: Strength 4/5 RUE/RLE, withdraws to noxious stimuli on the LUE/LLE.   SENSATION: Grossly intact to light touch all extremities  CHEST/LUNG: Non-labored breathing  ABDOMEN: Soft, nontender, nondistended  SKIN: Warm, dry    LABS:    12-10    139  |  105  |  24.0<H>  ----------------------------<  117<H>  4.1   |  23.0  |  1.06    Ca    8.8      10 Dec 2023 09:15  Mg     1.8     12-10    Urinalysis Basic - ( 10 Dec 2023 09:15 )  Color: x / Appearance: x / SG: x / pH: x  Gluc: 117 mg/dL / Ketone: x  / Bili: x / Urobili: x   Blood: x / Protein: x / Nitrite: x   Leuk Esterase: x / RBC: x / WBC x   Sq Epi: x / Non Sq Epi: x / Bacteria: x    12-09 @ 07:01  -  12-10 @ 07:00  --------------------------------------------------------  IN: 0 mL / OUT: 2047 mL / NET: -2047 mL    12-10 @ 07:01  -  12-10 @ 12:02  --------------------------------------------------------  IN: 0 mL / OUT: 200 mL / NET: -200 mL      RADIOLOGY & ADDITIONAL TESTS:    CT Head No Cont (12.09.23 @ 18:23)  IMPRESSION:   Unchanged fluid collection subjacent to the RIGHT   frontoparietal cranioplasty. The RIGHT subdural hematoma appears smaller   in size with minimal residual fluid and minimal pneumocephalus. Evolving   infarction again noted in the RIGHT frontal lobe.  *** ADDENDUM # 1 ***  Advanced periventricular and deep white matter ischemia is noted.    CT Head No Cont (12.08.23 @ 19:11)  IMPRESSION:  1.  Interval worsening of the acute component of the right frontal   convexity subdural hematoma as detailed above. Otherwise, no significant   interval change.       HPI:  82y M with PMH HTN, CAD s/p stents, renal cell carcinoma status post left nephrectomy, bladder CA, BPH, CHF, LBBB, vertigo,  HLD, 5.5cm AAA without rupture post EVAR, paroxysmal A-fib on Eliquis, Plavix, and aspirin, early stage Alzheimer dementia transferred from [facility] s/p unwitnessed fall with head strike at home found by wife on floor at 8am. Patient brought to urgent care by wife and had 5 staples to posterior scalp but was instructed to go to nearest ED.  CT head at [place] showed R frontal IPH, SDH, SAH at 9:00. CTA stroke protocol not performed at [facility]. Patient BIB on 6L NC.  Pt GCS 15 on arrival, A&Ox2 on arrival. After initial assessment, patient noted to be vomiting enroute to CT scanner.    INTERVAL HPI:  83M w/ PMH HTN, CAD s/p stents on ASA/Plavix, presented to [facility] 11/10/23 s/p unwitnessed fall with head strike at home found by wife, found with multicompartment ICH, s/p R craniectomy 11/10/23, course significant for Klebsiella pneumonia, hypernatremia, lethargy, LUE DVT, new anterior falcine SDH, downgraded to SDU 11/28.   Patient is now s/p R cranioplasty POD#2. He was seen this morning on rounds with neurosurgery team, seen lying comfortably in bed. His subgaleal drain was removed. Tolerating diet. Passing gas/BM. Denies headache, weakness, numbness, n/v/d, chills, chest pain, SOB. This morning some changes were seen on telemetry concerning for 5 beats of VT, cardiology was re-consulted for new findings.    Vital Signs Last 24 Hrs  T(C): 37.1 (10 Dec 2023 08:27), Max: 37.6 (09 Dec 2023 16:05)  T(F): 98.8 (10 Dec 2023 08:27), Max: 99.7 (09 Dec 2023 16:05)  HR: 82 (10 Dec 2023 08:27) (72 - 96)  BP: 105/57 (10 Dec 2023 08:27) (101/61 - 129/63)  BP(mean): 71 (10 Dec 2023 08:27) (71 - 87)  RR: 16 (10 Dec 2023 08:27) (15 - 20)  SpO2: 95% (10 Dec 2023 08:27) (95% - 100%)  Parameters below as of 10 Dec 2023 08:27  Patient On (Oxygen Delivery Method): room air    PHYSICAL EXAM:  GENERAL: NAD, well-groomed  HEAD:  Atraumatic, normocephalic  WOUND: R cranioplasty Mepilex dressing clean dry intact  MENTAL STATUS: AAOx2, not oriented to date; Awake; Opens eyes spontaneously; Appropriately conversant without aphasia; following simple commands  CRANIAL NERVES: PERRL. EOMI without nystagmus. Facial sensation intact V1-3 distribution b/l. Face symmetric w/ normal eye closure and smile, tongue midline.  MOTOR: Strength 4/5 RUE/RLE, withdraws to noxious stimuli on the LUE/LLE.   SENSATION: Grossly intact to light touch all extremities  CHEST/LUNG: Non-labored breathing  ABDOMEN: Soft, nontender, nondistended  SKIN: Warm, dry    LABS:    12-10    139  |  105  |  24.0<H>  ----------------------------<  117<H>  4.1   |  23.0  |  1.06    Ca    8.8      10 Dec 2023 09:15  Mg     1.8     12-10    Urinalysis Basic - ( 10 Dec 2023 09:15 )  Color: x / Appearance: x / SG: x / pH: x  Gluc: 117 mg/dL / Ketone: x  / Bili: x / Urobili: x   Blood: x / Protein: x / Nitrite: x   Leuk Esterase: x / RBC: x / WBC x   Sq Epi: x / Non Sq Epi: x / Bacteria: x    12-09 @ 07:01  -  12-10 @ 07:00  --------------------------------------------------------  IN: 0 mL / OUT: 2047 mL / NET: -2047 mL    12-10 @ 07:01  -  12-10 @ 12:02  --------------------------------------------------------  IN: 0 mL / OUT: 200 mL / NET: -200 mL      RADIOLOGY & ADDITIONAL TESTS:    CT Head No Cont (12.09.23 @ 18:23)  IMPRESSION:   Unchanged fluid collection subjacent to the RIGHT   frontoparietal cranioplasty. The RIGHT subdural hematoma appears smaller   in size with minimal residual fluid and minimal pneumocephalus. Evolving   infarction again noted in the RIGHT frontal lobe.  *** ADDENDUM # 1 ***  Advanced periventricular and deep white matter ischemia is noted.    CT Head No Cont (12.08.23 @ 19:11)  IMPRESSION:  1.  Interval worsening of the acute component of the right frontal   convexity subdural hematoma as detailed above. Otherwise, no significant   interval change.

## 2023-12-10 NOTE — PROGRESS NOTE ADULT - SUBJECTIVE AND OBJECTIVE BOX
HPI:  82y M with PMH HTN, CAD s/p stents, renal cell carcinoma status post left nephrectomy, bladder CA, BPH, CHF, LBBB, vertigo,  HLD, 5.5cm AAA without rupture post EVAR, paroxysmal A-fib on Eliquis, Plavix, and aspirin, early stage Alzheimer dementia transferred from [facility] s/p unwitnessed fall with head strike at home found by wife on floor at 8am. Patient brought to urgent care by wife and had 5 staples to posterior scalp but was instructed to go to nearest ED.  CT head at [place] showed R frontal IPH, SDH, SAH at 9:00. CTA stroke protocol not performed at [facility]. Patient BIB on 6L NC.  Pt GCS 15 on arrival, A&Ox2 on arrival. After initial assessment, patient noted to be vomiting enroute to CT scanner.          (10 Nov 2023 11:04)      PAST MEDICAL & SURGICAL HISTORY:  HTN  dx       Bladder Cancer (ICD9 188.9)  TCC, dx       Hyperlipemia (ICD9 272.4)  dx       Lt Renal Neoplasm  left - s/p left nephrectomy      Hx antineoplastic chemotherapy (BCG )      Left bundle branch block      Vertigo      Heel spur, left      Abdominal aortic aneurysm (AAA)  5.5 cm      BPH (benign prostatic hyperplasia)      Renal mass, right  current - following with Dr Pamela SHEARER (coronary artery disease)      Bladder cancer, primary, with metastasis from bladder to other site  Left kidney      Acute renal failure, unspecified acute renal failure type      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)      Abdominal aortic aneurysm (AAA) without rupture      Congestive heart failure, unspecified chronicity, unspecified heart failure type      Hypertension      History of abdominal aortic aneurysm (AAA)      CAD (coronary artery disease)      Alzheimers disease      urethral Stent 2008  stent placement and removal      S/P Cystoscopy  bladder polyps removal multiple times (since ), 6/10 , 10/2011 & 10/17/2011, , 2012, last 13, 2013, 2014      S/P Tonsillectomy      History of Lt  Nephrectomy  6/10 - left      History of cystoscopy  2015      H/O abdominal aortic aneurysm repair      S/P AAA repair      History of nephrectomy  Left      Presence of stent in LAD coronary artery      History of heart artery stent  DIONICIO          REVIEW OF SYSTEMS      General:	    Skin/Breast:  	  Ophthalmologic:  	  ENMT:	    Respiratory and Thorax:  	  Cardiovascular:	    Gastrointestinal:	    Genitourinary:	    Musculoskeletal:	    Neurological:	    Psychiatric:	    Hematology/Lymphatics:	    Endocrine:	    Allergic/Immunologic:	    MEDICATIONS  (STANDING):  atorvastatin 80 milliGRAM(s) Oral at bedtime  cefTRIAXone Injectable. 1000 milliGRAM(s) IV Push every 24 hours  diltiazem    Tablet 60 milliGRAM(s) Oral <User Schedule>  doxazosin 2 milliGRAM(s) Oral at bedtime  folic acid 1 milliGRAM(s) Oral daily  levETIRAcetam  IVPB 500 milliGRAM(s) IV Intermittent every 12 hours  magnesium oxide 400 milliGRAM(s) Oral once  melatonin 5 milliGRAM(s) Oral at bedtime  metoprolol tartrate 50 milliGRAM(s) Oral <User Schedule>  modafinil 100 milliGRAM(s) Oral <User Schedule>  Nephro-roma 1 Tablet(s) Oral daily  polyethylene glycol 3350 17 Gram(s) Oral daily  senna 2 Tablet(s) Oral at bedtime  sodium chloride 0.9% lock flush 3 milliLiter(s) IV Push every 8 hours  sodium chloride 0.9%. 1000 milliLiter(s) (75 mL/Hr) IV Continuous <Continuous>    MEDICATIONS  (PRN):  acetaminophen     Tablet .. 650 milliGRAM(s) Oral every 6 hours PRN Temp greater or equal to 38C (100.4F), Mild Pain (1 - 3)  hydrALAZINE Injectable 10 milliGRAM(s) IV Push every 2 hours PRN SBP > 160 mmHg  labetalol Injectable 10 milliGRAM(s) IV Push every 2 hours PRN Systolic blood pressure > 160  ondansetron Injectable 4 milliGRAM(s) IV Push every 6 hours PRN Nausea and/or Vomiting  oxyCODONE    IR 5 milliGRAM(s) Oral every 4 hours PRN Moderate Pain (4 - 6)  oxyCODONE    IR 10 milliGRAM(s) Oral every 4 hours PRN Severe Pain (7 - 10)      Allergies    penicillin (Rash)  heparin (Other (Severe))  penicillin (Hives)  Cipro (Other)  Levaquin (Other)  heparin (Other)    Intolerances        SOCIAL HISTORY:    FAMILY HISTORY:  Family history of MI (myocardial infarction) (Father, Mother)  father  74y/o MI, mother  94y/o alzheimers    Family history of early CAD (Father, Mother)        Vital Signs Last 24 Hrs  T(C): 37.9 (10 Dec 2023 20:00), Max: 37.9 (10 Dec 2023 17:53)  T(F): 100.2 (10 Dec 2023 20:00), Max: 100.2 (10 Dec 2023 17:53)  HR: 125 (10 Dec 2023 20:00) (82 - 138)  BP: 130/67 (10 Dec 2023 20:00) (100/60 - 132/85)  BP(mean): 86 (10 Dec 2023 20:00) (71 - 97)  RR: 14 (10 Dec 2023 20:00) (14 - 20)  SpO2: 96% (10 Dec 2023 20:00) (95% - 100%)    Parameters below as of 10 Dec 2023 20:00  Patient On (Oxygen Delivery Method): room air        PHYSICAL EXAM:      Constitutional:    Eyes:    ENMT:    Neck:    Breasts:    Back:    Respiratory:    Cardiovascular:    Gastrointestinal:    Genitourinary:    Rectal:    Extremities:    Vascular:    Neurological:    Skin:    Lymph Nodes:    Musculoskeletal:    Psychiatric:        LABS:                        8.1    8.18  )-----------( 138      ( 10 Dec 2023 18:23 )             23.9     12-10    139  |  105  |  24.0<H>  ----------------------------<  117<H>  4.1   |  23.0  |  1.06    Ca    8.8      10 Dec 2023 09:15  Phos  2.5     12-10  Mg     1.8     12-10        Urinalysis Basic - ( 10 Dec 2023 18:20 )    Color: Yellow / Appearance: Clear / S.020 / pH: x  Gluc: x / Ketone: Trace mg/dL  / Bili: Negative / Urobili: 0.2 mg/dL   Blood: x / Protein: 100 mg/dL / Nitrite: Negative   Leuk Esterase: Moderate / RBC: 24 /HPF / WBC 46 /HPF   Sq Epi: x / Non Sq Epi: 0 /HPF / Bacteria: Negative /HPF        RADIOLOGY & ADDITIONAL STUDIES: HPI:  82y M with PMH HTN, CAD s/p stents, renal cell carcinoma status post left nephrectomy, bladder CA, BPH, CHF, LBBB, vertigo,  HLD, 5.5cm AAA without rupture post EVAR, paroxysmal A-fib on Eliquis, Plavix, and aspirin, early stage Alzheimer dementia transferred from [facility] s/p unwitnessed fall with head strike at home found by wife on floor at 8am. Patient brought to urgent care by wife and had 5 staples to posterior scalp but was instructed to go to nearest ED.  CT head at [place] showed R frontal IPH, SDH, SAH at 9:00. CTA stroke protocol not performed at [facility]. Patient BIB on 6L NC.  Pt GCS 15 on arrival, A&Ox2 on arrival. After initial assessment, patient noted to be vomiting enroute to CT scanner.          (10 Nov 2023 11:04)      PAST MEDICAL & SURGICAL HISTORY:  HTN  dx       Bladder Cancer (ICD9 188.9)  TCC, dx       Hyperlipemia (ICD9 272.4)  dx       Lt Renal Neoplasm  left - s/p left nephrectomy      Hx antineoplastic chemotherapy (BCG )      Left bundle branch block      Vertigo      Heel spur, left      Abdominal aortic aneurysm (AAA)  5.5 cm      BPH (benign prostatic hyperplasia)      Renal mass, right  current - following with Dr Pamela SHEARER (coronary artery disease)      Bladder cancer, primary, with metastasis from bladder to other site  Left kidney      Acute renal failure, unspecified acute renal failure type      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)      Abdominal aortic aneurysm (AAA) without rupture      Congestive heart failure, unspecified chronicity, unspecified heart failure type      Hypertension      History of abdominal aortic aneurysm (AAA)      CAD (coronary artery disease)      Alzheimers disease      urethral Stent 2008  stent placement and removal      S/P Cystoscopy  bladder polyps removal multiple times (since ), 6/10 , 10/2011 & 10/17/2011, , 2012, last 13, 2013, 2014      S/P Tonsillectomy      History of Lt  Nephrectomy  6/10 - left      History of cystoscopy  2015      H/O abdominal aortic aneurysm repair      S/P AAA repair      History of nephrectomy  Left      Presence of stent in LAD coronary artery      History of heart artery stent  DIONICIO          REVIEW OF SYSTEMS      General:	    Skin/Breast:  	  Ophthalmologic:  	  ENMT:	    Respiratory and Thorax:  	  Cardiovascular:	    Gastrointestinal:	    Genitourinary:	    Musculoskeletal:	    Neurological:	    Psychiatric:	    Hematology/Lymphatics:	    Endocrine:	    Allergic/Immunologic:	    MEDICATIONS  (STANDING):  atorvastatin 80 milliGRAM(s) Oral at bedtime  cefTRIAXone Injectable. 1000 milliGRAM(s) IV Push every 24 hours  diltiazem    Tablet 60 milliGRAM(s) Oral <User Schedule>  doxazosin 2 milliGRAM(s) Oral at bedtime  folic acid 1 milliGRAM(s) Oral daily  levETIRAcetam  IVPB 500 milliGRAM(s) IV Intermittent every 12 hours  magnesium oxide 400 milliGRAM(s) Oral once  melatonin 5 milliGRAM(s) Oral at bedtime  metoprolol tartrate 50 milliGRAM(s) Oral <User Schedule>  modafinil 100 milliGRAM(s) Oral <User Schedule>  Nephro-roma 1 Tablet(s) Oral daily  polyethylene glycol 3350 17 Gram(s) Oral daily  senna 2 Tablet(s) Oral at bedtime  sodium chloride 0.9% lock flush 3 milliLiter(s) IV Push every 8 hours  sodium chloride 0.9%. 1000 milliLiter(s) (75 mL/Hr) IV Continuous <Continuous>    MEDICATIONS  (PRN):  acetaminophen     Tablet .. 650 milliGRAM(s) Oral every 6 hours PRN Temp greater or equal to 38C (100.4F), Mild Pain (1 - 3)  hydrALAZINE Injectable 10 milliGRAM(s) IV Push every 2 hours PRN SBP > 160 mmHg  labetalol Injectable 10 milliGRAM(s) IV Push every 2 hours PRN Systolic blood pressure > 160  ondansetron Injectable 4 milliGRAM(s) IV Push every 6 hours PRN Nausea and/or Vomiting  oxyCODONE    IR 5 milliGRAM(s) Oral every 4 hours PRN Moderate Pain (4 - 6)  oxyCODONE    IR 10 milliGRAM(s) Oral every 4 hours PRN Severe Pain (7 - 10)      Allergies    penicillin (Rash)  heparin (Other (Severe))  penicillin (Hives)  Cipro (Other)  Levaquin (Other)  heparin (Other)    Intolerances        SOCIAL HISTORY:    FAMILY HISTORY:  Family history of MI (myocardial infarction) (Father, Mother)  father  76y/o MI, mother  96y/o alzheimers    Family history of early CAD (Father, Mother)        Vital Signs Last 24 Hrs  T(C): 37.9 (10 Dec 2023 20:00), Max: 37.9 (10 Dec 2023 17:53)  T(F): 100.2 (10 Dec 2023 20:00), Max: 100.2 (10 Dec 2023 17:53)  HR: 125 (10 Dec 2023 20:00) (82 - 138)  BP: 130/67 (10 Dec 2023 20:00) (100/60 - 132/85)  BP(mean): 86 (10 Dec 2023 20:00) (71 - 97)  RR: 14 (10 Dec 2023 20:00) (14 - 20)  SpO2: 96% (10 Dec 2023 20:00) (95% - 100%)    Parameters below as of 10 Dec 2023 20:00  Patient On (Oxygen Delivery Method): room air        PHYSICAL EXAM:      Constitutional:    Eyes:    ENMT:    Neck:    Breasts:    Back:    Respiratory:    Cardiovascular:    Gastrointestinal:    Genitourinary:    Rectal:    Extremities:    Vascular:    Neurological:    Skin:    Lymph Nodes:    Musculoskeletal:    Psychiatric:        LABS:                        8.1    8.18  )-----------( 138      ( 10 Dec 2023 18:23 )             23.9     12-10    139  |  105  |  24.0<H>  ----------------------------<  117<H>  4.1   |  23.0  |  1.06    Ca    8.8      10 Dec 2023 09:15  Phos  2.5     12-10  Mg     1.8     12-10        Urinalysis Basic - ( 10 Dec 2023 18:20 )    Color: Yellow / Appearance: Clear / S.020 / pH: x  Gluc: x / Ketone: Trace mg/dL  / Bili: Negative / Urobili: 0.2 mg/dL   Blood: x / Protein: 100 mg/dL / Nitrite: Negative   Leuk Esterase: Moderate / RBC: 24 /HPF / WBC 46 /HPF   Sq Epi: x / Non Sq Epi: 0 /HPF / Bacteria: Negative /HPF        RADIOLOGY & ADDITIONAL STUDIES: 84 y/o male with PMH of HTN, CAD s/p PCO, RCC s/p left nephrectomy, bladder Ca, BPH, CHF, Vertigo, HLD, AAA s/p EVAR, parox Afib, h/o HIT, Alzheimer transferred s/p fall with Rt frontal IPH. Initially had hemicraniectomy course complicated by RVR, aspiration PNA, hypoxic respiratory failure, LUE DVT and questionable increase in AAA size. Started on AC with waxing/waning mental status prompting head imaging with evidence of new bleed. Cardiology following. Neurology consulted after MRI showed stroke. Echo done concerning for mobile echodensity on aortic valve and strokes in multiple arterial territories; WHIT recommended but as noted documented but family declined. Patient was downgraded to medical floor and later upgraded to neuro ICU after cranioplasty; subgaleal drain was removed today. Patient found to have low grade fever (100.2), tachycardic, blood culture sent, UA noted for pyuria. Now downgraded to medicine team.     Patient seen and examined at bed side; not in acute distress. Patient has no acute complaint.       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 1000 milliGRAM(s) IV Push every 24 hours  diltiazem    Tablet 60 milliGRAM(s) Oral <User Schedule>  doxazosin 2 milliGRAM(s) Oral at bedtime  folic acid 1 milliGRAM(s) Oral daily  levETIRAcetam  IVPB 500 milliGRAM(s) IV Intermittent every 12 hours  magnesium oxide 400 milliGRAM(s) Oral once  melatonin 5 milliGRAM(s) Oral at bedtime  metoprolol tartrate 50 milliGRAM(s) Oral <User Schedule>  modafinil 100 milliGRAM(s) Oral <User Schedule>  Nephro-roma 1 Tablet(s) Oral daily  polyethylene glycol 3350 17 Gram(s) Oral daily  senna 2 Tablet(s) Oral at bedtime  sodium chloride 0.9% lock flush 3 milliLiter(s) IV Push every 8 hours  sodium chloride 0.9%. 1000 milliLiter(s) (75 mL/Hr) IV Continuous <Continuous>    MEDICATIONS  (PRN):  acetaminophen     Tablet .. 650 milliGRAM(s) Oral every 6 hours PRN Temp greater or equal to 38C (100.4F), Mild Pain (1 - 3)  hydrALAZINE Injectable 10 milliGRAM(s) IV Push every 2 hours PRN SBP > 160 mmHg  labetalol Injectable 10 milliGRAM(s) IV Push every 2 hours PRN Systolic blood pressure > 160  ondansetron Injectable 4 milliGRAM(s) IV Push every 6 hours PRN Nausea and/or Vomiting  oxyCODONE    IR 5 milliGRAM(s) Oral every 4 hours PRN Moderate Pain (4 - 6)  oxyCODONE    IR 10 milliGRAM(s) Oral every 4 hours PRN Severe Pain (7 - 10)      Allergies  penicillin (Rash)  heparin (Other (Severe))  penicillin (Hives)  Cipro (Other)  Levaquin (Other)  heparin (Other)      SOCIAL HISTORY: no cigarette, alcohol or illicit drug use. .     FAMILY HISTORY:  Family history of MI (myocardial infarction) (Father, Mother)  father  76y/o MI, mother  94y/o alzheimers    Family history of early CAD (Father, Mother)    Vital Signs Last 24 Hrs  T(C): 37.9 (10 Dec 2023 20:00), Max: 37.9 (10 Dec 2023 17:53)  T(F): 100.2 (10 Dec 2023 20:00), Max: 100.2 (10 Dec 2023 17:53)  HR: 125 (10 Dec 2023 20:00) (82 - 138)  BP: 130/67 (10 Dec 2023 20:00) (100/60 - 132/85)  BP(mean): 86 (10 Dec 2023 20:00) (71 - 97)  RR: 14 (10 Dec 2023 20:00) (14 - 20)  SpO2: 96% (10 Dec 2023 20:00) (95% - 100%)    Parameters below as of 10 Dec 2023 20:00  Patient On (Oxygen Delivery Method): room air        PHYSICAL EXAM:      Constitutional: well groomed, laying comfortably in bed, not in acute distress    Head: s/p cranioplasty, dressing in place on the right side of the head.     Eyes: PERRLA     Respiratory: CTA b/l, saturating well on RA, able to speak in full sentence.     Cardiovascular: afib, s1, s2    Gastrointestinal: soft, non-tender, non-distended, BS+     Genitourinary: hall in place     Extremities: no edema     Neurological: awake, alert and oriented to self (name and birthday). Strength on Left side: 0/5.    Skin: warm     Musculoskeletal: ROM intact on the right side.     Psychiatric: normal mood and affect        LABS:                        8.1    8.18  )-----------( 138      ( 10 Dec 2023 18:23 )             23.9     12-10    139  |  105  |  24.0<H>  ----------------------------<  117<H>  4.1   |  23.0  |  1.06    Ca    8.8      10 Dec 2023 09:15  Phos  2.5     12-10  Mg     1.8     12-10        Urinalysis Basic - ( 10 Dec 2023 18:20 )    Color: Yellow / Appearance: Clear / S.020 / pH: x  Gluc: x / Ketone: Trace mg/dL  / Bili: Negative / Urobili: 0.2 mg/dL   Blood: x / Protein: 100 mg/dL / Nitrite: Negative   Leuk Esterase: Moderate / RBC: 24 /HPF / WBC 46 /HPF   Sq Epi: x / Non Sq Epi: 0 /HPF / Bacteria: Negative /HPF         82 y/o male with PMH of HTN, CAD s/p PCO, RCC s/p left nephrectomy, bladder Ca, BPH, CHF, Vertigo, HLD, AAA s/p EVAR, parox Afib, h/o HIT, Alzheimer transferred s/p fall with Rt frontal IPH. Initially had hemicraniectomy course complicated by RVR, aspiration PNA, hypoxic respiratory failure, LUE DVT and questionable increase in AAA size. Started on AC with waxing/waning mental status prompting head imaging with evidence of new bleed. Cardiology following. Neurology consulted after MRI showed stroke. Echo done concerning for mobile echodensity on aortic valve and strokes in multiple arterial territories; WHIT recommended but as noted documented but family declined. Patient was downgraded to medical floor and later upgraded to neuro ICU after cranioplasty; subgaleal drain was removed today. Patient found to have low grade fever (100.2), tachycardic, blood culture sent, UA noted for pyuria. Now downgraded to medicine team.     Patient seen and examined at bed side; not in acute distress. Patient has no acute complaint.       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 1000 milliGRAM(s) IV Push every 24 hours  diltiazem    Tablet 60 milliGRAM(s) Oral <User Schedule>  doxazosin 2 milliGRAM(s) Oral at bedtime  folic acid 1 milliGRAM(s) Oral daily  levETIRAcetam  IVPB 500 milliGRAM(s) IV Intermittent every 12 hours  magnesium oxide 400 milliGRAM(s) Oral once  melatonin 5 milliGRAM(s) Oral at bedtime  metoprolol tartrate 50 milliGRAM(s) Oral <User Schedule>  modafinil 100 milliGRAM(s) Oral <User Schedule>  Nephro-roma 1 Tablet(s) Oral daily  polyethylene glycol 3350 17 Gram(s) Oral daily  senna 2 Tablet(s) Oral at bedtime  sodium chloride 0.9% lock flush 3 milliLiter(s) IV Push every 8 hours  sodium chloride 0.9%. 1000 milliLiter(s) (75 mL/Hr) IV Continuous <Continuous>    MEDICATIONS  (PRN):  acetaminophen     Tablet .. 650 milliGRAM(s) Oral every 6 hours PRN Temp greater or equal to 38C (100.4F), Mild Pain (1 - 3)  hydrALAZINE Injectable 10 milliGRAM(s) IV Push every 2 hours PRN SBP > 160 mmHg  labetalol Injectable 10 milliGRAM(s) IV Push every 2 hours PRN Systolic blood pressure > 160  ondansetron Injectable 4 milliGRAM(s) IV Push every 6 hours PRN Nausea and/or Vomiting  oxyCODONE    IR 5 milliGRAM(s) Oral every 4 hours PRN Moderate Pain (4 - 6)  oxyCODONE    IR 10 milliGRAM(s) Oral every 4 hours PRN Severe Pain (7 - 10)      Allergies  penicillin (Rash)  heparin (Other (Severe))  penicillin (Hives)  Cipro (Other)  Levaquin (Other)  heparin (Other)      SOCIAL HISTORY: no cigarette, alcohol or illicit drug use. .     FAMILY HISTORY:  Family history of MI (myocardial infarction) (Father, Mother)  father  76y/o MI, mother  96y/o alzheimers    Family history of early CAD (Father, Mother)    Vital Signs Last 24 Hrs  T(C): 37.9 (10 Dec 2023 20:00), Max: 37.9 (10 Dec 2023 17:53)  T(F): 100.2 (10 Dec 2023 20:00), Max: 100.2 (10 Dec 2023 17:53)  HR: 125 (10 Dec 2023 20:00) (82 - 138)  BP: 130/67 (10 Dec 2023 20:00) (100/60 - 132/85)  BP(mean): 86 (10 Dec 2023 20:00) (71 - 97)  RR: 14 (10 Dec 2023 20:00) (14 - 20)  SpO2: 96% (10 Dec 2023 20:00) (95% - 100%)    Parameters below as of 10 Dec 2023 20:00  Patient On (Oxygen Delivery Method): room air        PHYSICAL EXAM:      Constitutional: well groomed, laying comfortably in bed, not in acute distress    Head: s/p cranioplasty, dressing in place on the right side of the head.     Eyes: PERRLA     Respiratory: CTA b/l, saturating well on RA, able to speak in full sentence.     Cardiovascular: afib, s1, s2    Gastrointestinal: soft, non-tender, non-distended, BS+     Genitourinary: hall in place     Extremities: no edema     Neurological: awake, alert and oriented to self (name and birthday). Strength on Left side: 0/5.    Skin: warm     Musculoskeletal: ROM intact on the right side.     Psychiatric: normal mood and affect        LABS:                        8.1    8.18  )-----------( 138      ( 10 Dec 2023 18:23 )             23.9     12-10    139  |  105  |  24.0<H>  ----------------------------<  117<H>  4.1   |  23.0  |  1.06    Ca    8.8      10 Dec 2023 09:15  Phos  2.5     12-10  Mg     1.8     12-10        Urinalysis Basic - ( 10 Dec 2023 18:20 )    Color: Yellow / Appearance: Clear / S.020 / pH: x  Gluc: x / Ketone: Trace mg/dL  / Bili: Negative / Urobili: 0.2 mg/dL   Blood: x / Protein: 100 mg/dL / Nitrite: Negative   Leuk Esterase: Moderate / RBC: 24 /HPF / WBC 46 /HPF   Sq Epi: x / Non Sq Epi: 0 /HPF / Bacteria: Negative /HPF         Patient seen and examined before midnight    82 y/o male with PMH of HTN, CAD s/p PCO, RCC s/p left nephrectomy, bladder Ca, BPH, CHF, Vertigo, HLD, AAA s/p EVAR, parox Afib, h/o HIT, Alzheimer transferred s/p fall with Rt frontal IPH. Initially had hemicraniectomy course complicated by RVR, aspiration PNA, hypoxic respiratory failure, LUE DVT and questionable increase in AAA size. Started on AC with waxing/waning mental status prompting head imaging with evidence of new bleed. Cardiology following. Neurology consulted after MRI showed stroke. Echo done concerning for mobile echodensity on aortic valve and strokes in multiple arterial territories; WHIT recommended but as noted documented but family declined. Patient was downgraded to medical floor and later upgraded to neuro ICU after cranioplasty; subgaleal drain was removed today. Patient found to have low grade fever (100.2), tachycardic, blood culture sent, UA noted for pyuria. Now downgraded to medicine team.     Patient seen and examined at bed side; not in acute distress. Patient has no acute complaint.       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 1000 milliGRAM(s) IV Push every 24 hours  diltiazem    Tablet 60 milliGRAM(s) Oral <User Schedule>  doxazosin 2 milliGRAM(s) Oral at bedtime  folic acid 1 milliGRAM(s) Oral daily  levETIRAcetam  IVPB 500 milliGRAM(s) IV Intermittent every 12 hours  magnesium oxide 400 milliGRAM(s) Oral once  melatonin 5 milliGRAM(s) Oral at bedtime  metoprolol tartrate 50 milliGRAM(s) Oral <User Schedule>  modafinil 100 milliGRAM(s) Oral <User Schedule>  Nephro-roma 1 Tablet(s) Oral daily  polyethylene glycol 3350 17 Gram(s) Oral daily  senna 2 Tablet(s) Oral at bedtime  sodium chloride 0.9% lock flush 3 milliLiter(s) IV Push every 8 hours  sodium chloride 0.9%. 1000 milliLiter(s) (75 mL/Hr) IV Continuous <Continuous>    MEDICATIONS  (PRN):  acetaminophen     Tablet .. 650 milliGRAM(s) Oral every 6 hours PRN Temp greater or equal to 38C (100.4F), Mild Pain (1 - 3)  hydrALAZINE Injectable 10 milliGRAM(s) IV Push every 2 hours PRN SBP > 160 mmHg  labetalol Injectable 10 milliGRAM(s) IV Push every 2 hours PRN Systolic blood pressure > 160  ondansetron Injectable 4 milliGRAM(s) IV Push every 6 hours PRN Nausea and/or Vomiting  oxyCODONE    IR 5 milliGRAM(s) Oral every 4 hours PRN Moderate Pain (4 - 6)  oxyCODONE    IR 10 milliGRAM(s) Oral every 4 hours PRN Severe Pain (7 - 10)      Allergies  penicillin (Rash)  heparin (Other (Severe))  penicillin (Hives)  Cipro (Other)  Levaquin (Other)  heparin (Other)      SOCIAL HISTORY: no cigarette, alcohol or illicit drug use. .     FAMILY HISTORY:  Family history of MI (myocardial infarction) (Father, Mother)  father  76y/o MI, mother  96y/o alzheimers    Family history of early CAD (Father, Mother)    Vital Signs Last 24 Hrs  T(C): 37.9 (10 Dec 2023 20:00), Max: 37.9 (10 Dec 2023 17:53)  T(F): 100.2 (10 Dec 2023 20:00), Max: 100.2 (10 Dec 2023 17:53)  HR: 125 (10 Dec 2023 20:00) (82 - 138)  BP: 130/67 (10 Dec 2023 20:00) (100/60 - 132/85)  BP(mean): 86 (10 Dec 2023 20:00) (71 - 97)  RR: 14 (10 Dec 2023 20:) (14 - 20)  SpO2: 96% (10 Dec 2023 20:00) (95% - 100%)    Parameters below as of 10 Dec 2023 20:00  Patient On (Oxygen Delivery Method): room air        PHYSICAL EXAM:      Constitutional: well groomed, laying comfortably in bed, not in acute distress    Head: s/p cranioplasty, dressing in place on the right side of the head.     Eyes: PERRLA     Respiratory: CTA b/l, saturating well on RA, able to speak in full sentence.     Cardiovascular: afib, s1, s2    Gastrointestinal: soft, non-tender, non-distended, BS+     Genitourinary: hall in place     Extremities: no edema     Neurological: awake, alert and oriented to self (name and birthday). Strength on Left side: 0/5.    Skin: warm     Musculoskeletal: ROM intact on the right side.     Psychiatric: normal mood and affect        LABS:                        8.1    8.18  )-----------( 138      ( 10 Dec 2023 18:23 )             23.9     12-10    139  |  105  |  24.0<H>  ----------------------------<  117<H>  4.1   |  23.0  |  1.06    Ca    8.8      10 Dec 2023 09:15  Phos  2.5     12-10  Mg     1.8     12-10        Urinalysis Basic - ( 10 Dec 2023 18:20 )    Color: Yellow / Appearance: Clear / S.020 / pH: x  Gluc: x / Ketone: Trace mg/dL  / Bili: Negative / Urobili: 0.2 mg/dL   Blood: x / Protein: 100 mg/dL / Nitrite: Negative   Leuk Esterase: Moderate / RBC: 24 /HPF / WBC 46 /HPF   Sq Epi: x / Non Sq Epi: 0 /HPF / Bacteria: Negative /HPF

## 2023-12-10 NOTE — PROGRESS NOTE ADULT - PROBLEM SELECTOR PLAN 3
- Patient with a history of CAD with last PCI in 2018, no longer a need for DAPT from a cardiac perspective.   - Patient was maintained on DAPT due to EVAR with stent in 2022, would consult Vascular to discuss the need to restart.   - If able would restart single anti-platelet therapy either aspirin 81mg or plavix 75mg. Can be determined by Neurosurgery.   - Continue metoprolol and metoprolol.

## 2023-12-10 NOTE — PROGRESS NOTE ADULT - PROBLEM SELECTOR PLAN 2
- Reviewed telemetry with Dr. Whitman, appears to be atrial tachycardia with underlying conduction delay.   - Keep K>4, Mg>2.   - Continue metoprolol.

## 2023-12-11 LAB
ANION GAP SERPL CALC-SCNC: 13 MMOL/L — SIGNIFICANT CHANGE UP (ref 5–17)
ANION GAP SERPL CALC-SCNC: 13 MMOL/L — SIGNIFICANT CHANGE UP (ref 5–17)
BUN SERPL-MCNC: 23.3 MG/DL — HIGH (ref 8–20)
BUN SERPL-MCNC: 23.3 MG/DL — HIGH (ref 8–20)
CALCIUM SERPL-MCNC: 8.5 MG/DL — SIGNIFICANT CHANGE UP (ref 8.4–10.5)
CALCIUM SERPL-MCNC: 8.5 MG/DL — SIGNIFICANT CHANGE UP (ref 8.4–10.5)
CHLORIDE SERPL-SCNC: 104 MMOL/L — SIGNIFICANT CHANGE UP (ref 96–108)
CHLORIDE SERPL-SCNC: 104 MMOL/L — SIGNIFICANT CHANGE UP (ref 96–108)
CO2 SERPL-SCNC: 21 MMOL/L — LOW (ref 22–29)
CO2 SERPL-SCNC: 21 MMOL/L — LOW (ref 22–29)
CREAT SERPL-MCNC: 0.93 MG/DL — SIGNIFICANT CHANGE UP (ref 0.5–1.3)
CREAT SERPL-MCNC: 0.93 MG/DL — SIGNIFICANT CHANGE UP (ref 0.5–1.3)
EGFR: 82 ML/MIN/1.73M2 — SIGNIFICANT CHANGE UP
EGFR: 82 ML/MIN/1.73M2 — SIGNIFICANT CHANGE UP
GLUCOSE SERPL-MCNC: 132 MG/DL — HIGH (ref 70–99)
GLUCOSE SERPL-MCNC: 132 MG/DL — HIGH (ref 70–99)
HCT VFR BLD CALC: 23.5 % — LOW (ref 39–50)
HCT VFR BLD CALC: 23.5 % — LOW (ref 39–50)
HGB BLD-MCNC: 7.6 G/DL — LOW (ref 13–17)
HGB BLD-MCNC: 7.6 G/DL — LOW (ref 13–17)
MCHC RBC-ENTMCNC: 31.4 PG — SIGNIFICANT CHANGE UP (ref 27–34)
MCHC RBC-ENTMCNC: 31.4 PG — SIGNIFICANT CHANGE UP (ref 27–34)
MCHC RBC-ENTMCNC: 32.3 GM/DL — SIGNIFICANT CHANGE UP (ref 32–36)
MCHC RBC-ENTMCNC: 32.3 GM/DL — SIGNIFICANT CHANGE UP (ref 32–36)
MCV RBC AUTO: 97.1 FL — SIGNIFICANT CHANGE UP (ref 80–100)
MCV RBC AUTO: 97.1 FL — SIGNIFICANT CHANGE UP (ref 80–100)
PLATELET # BLD AUTO: 126 K/UL — LOW (ref 150–400)
PLATELET # BLD AUTO: 126 K/UL — LOW (ref 150–400)
POTASSIUM SERPL-MCNC: 4 MMOL/L — SIGNIFICANT CHANGE UP (ref 3.5–5.3)
POTASSIUM SERPL-MCNC: 4 MMOL/L — SIGNIFICANT CHANGE UP (ref 3.5–5.3)
POTASSIUM SERPL-SCNC: 4 MMOL/L — SIGNIFICANT CHANGE UP (ref 3.5–5.3)
POTASSIUM SERPL-SCNC: 4 MMOL/L — SIGNIFICANT CHANGE UP (ref 3.5–5.3)
RBC # BLD: 2.42 M/UL — LOW (ref 4.2–5.8)
RBC # BLD: 2.42 M/UL — LOW (ref 4.2–5.8)
RBC # FLD: 14.3 % — SIGNIFICANT CHANGE UP (ref 10.3–14.5)
RBC # FLD: 14.3 % — SIGNIFICANT CHANGE UP (ref 10.3–14.5)
SODIUM SERPL-SCNC: 138 MMOL/L — SIGNIFICANT CHANGE UP (ref 135–145)
SODIUM SERPL-SCNC: 138 MMOL/L — SIGNIFICANT CHANGE UP (ref 135–145)
WBC # BLD: 6.79 K/UL — SIGNIFICANT CHANGE UP (ref 3.8–10.5)
WBC # BLD: 6.79 K/UL — SIGNIFICANT CHANGE UP (ref 3.8–10.5)
WBC # FLD AUTO: 6.79 K/UL — SIGNIFICANT CHANGE UP (ref 3.8–10.5)
WBC # FLD AUTO: 6.79 K/UL — SIGNIFICANT CHANGE UP (ref 3.8–10.5)

## 2023-12-11 PROCEDURE — 70450 CT HEAD/BRAIN W/O DYE: CPT | Mod: 26

## 2023-12-11 PROCEDURE — 99232 SBSQ HOSP IP/OBS MODERATE 35: CPT

## 2023-12-11 PROCEDURE — 99233 SBSQ HOSP IP/OBS HIGH 50: CPT | Mod: GC

## 2023-12-11 RX ORDER — MEMANTINE HYDROCHLORIDE 10 MG/1
5 TABLET ORAL
Refills: 0 | Status: DISCONTINUED | OUTPATIENT
Start: 2023-12-11 | End: 2023-12-25

## 2023-12-11 RX ADMIN — SODIUM CHLORIDE 3 MILLILITER(S): 9 INJECTION INTRAMUSCULAR; INTRAVENOUS; SUBCUTANEOUS at 05:22

## 2023-12-11 RX ADMIN — OXYCODONE HYDROCHLORIDE 5 MILLIGRAM(S): 5 TABLET ORAL at 14:34

## 2023-12-11 RX ADMIN — LEVETIRACETAM 400 MILLIGRAM(S): 250 TABLET, FILM COATED ORAL at 05:23

## 2023-12-11 RX ADMIN — Medication 50 MILLIGRAM(S): at 14:35

## 2023-12-11 RX ADMIN — Medication 60 MILLIGRAM(S): at 05:23

## 2023-12-11 RX ADMIN — Medication 650 MILLIGRAM(S): at 08:00

## 2023-12-11 RX ADMIN — Medication 50 MILLIGRAM(S): at 00:19

## 2023-12-11 RX ADMIN — MODAFINIL 100 MILLIGRAM(S): 200 TABLET ORAL at 14:34

## 2023-12-11 RX ADMIN — LEVETIRACETAM 400 MILLIGRAM(S): 250 TABLET, FILM COATED ORAL at 18:57

## 2023-12-11 RX ADMIN — ATORVASTATIN CALCIUM 80 MILLIGRAM(S): 80 TABLET, FILM COATED ORAL at 22:13

## 2023-12-11 RX ADMIN — SENNA PLUS 2 TABLET(S): 8.6 TABLET ORAL at 22:13

## 2023-12-11 RX ADMIN — Medication 1 MILLIGRAM(S): at 14:34

## 2023-12-11 RX ADMIN — Medication 5 MILLIGRAM(S): at 22:13

## 2023-12-11 RX ADMIN — Medication 2 MILLIGRAM(S): at 22:12

## 2023-12-11 RX ADMIN — CEFTRIAXONE 1000 MILLIGRAM(S): 500 INJECTION, POWDER, FOR SOLUTION INTRAMUSCULAR; INTRAVENOUS at 22:12

## 2023-12-11 RX ADMIN — SODIUM CHLORIDE 3 MILLILITER(S): 9 INJECTION INTRAMUSCULAR; INTRAVENOUS; SUBCUTANEOUS at 14:31

## 2023-12-11 RX ADMIN — Medication 1 TABLET(S): at 14:34

## 2023-12-11 NOTE — PROGRESS NOTE ADULT - SUBJECTIVE AND OBJECTIVE BOX
DEUCE BALL    [id number]    83y      Male    CC: s/p fall    INTERVAL HPI/OVERNIGHT EVENTS: pt seen and examined. downgraded from neurosx last night. pt denies pain, ha, n/v    REVIEW OF SYSTEMS:    CONSTITUTIONAL: No weight loss  RESPIRATORY: No cough, wheezing, hemoptysis; No shortness of breath  CARDIOVASCULAR: No chest pain, palpitations  GASTROINTESTINAL: No abdominal or epigastric pain. No nausea, vomiting    Vital Signs Last 24 Hrs  T(C): 36.4 (11 Dec 2023 08:00), Max: 37.9 (10 Dec 2023 17:53)  T(F): 97.6 (11 Dec 2023 08:00), Max: 100.2 (10 Dec 2023 17:53)  HR: 76 (11 Dec 2023 16:00) (76 - 125)  BP: 129/64 (11 Dec 2023 16:00) (96/62 - 130/67)  BP(mean): 82 (11 Dec 2023 06:00) (75 - 90)  RR: 17 (11 Dec 2023 16:00) (14 - 25)  SpO2: 98% (11 Dec 2023 16:00) (94% - 98%)    Parameters below as of 11 Dec 2023 06:00  Patient On (Oxygen Delivery Method): room air        PHYSICAL EXAM:    GENERAL: NAD  CHEST/LUNG: Clear to auscultation bilaterally; respirations unlabored on RA   HEART: S1S2+, Regular rate and rhythm  ABDOMEN: Soft, Nontender, Nondistended; Bowel sounds present  SKIN: warm, dry   NEURO: drowsy but responsive, following commands; strength 3/5 RUE, RLE; withdraws to pain LUE/LLE  PSYCH: normal affect     LABS:                        7.6    6.79  )-----------( 126      ( 11 Dec 2023 06:23 )             23.5     12-11    138  |  104  |  23.3<H>  ----------------------------<  132<H>  4.0   |  21.0<L>  |  0.93    Ca    8.5      11 Dec 2023 06:23  Phos  2.5     12-10  Mg     1.8     12-10        Urinalysis Basic - ( 11 Dec 2023 06:23 )    Color: x / Appearance: x / SG: x / pH: x  Gluc: 132 mg/dL / Ketone: x  / Bili: x / Urobili: x   Blood: x / Protein: x / Nitrite: x   Leuk Esterase: x / RBC: x / WBC x   Sq Epi: x / Non Sq Epi: x / Bacteria: x          MEDICATIONS  (STANDING):  atorvastatin 80 milliGRAM(s) Oral at bedtime  cefTRIAXone Injectable. 1000 milliGRAM(s) IV Push every 24 hours  diltiazem    Tablet 60 milliGRAM(s) Oral <User Schedule>  doxazosin 2 milliGRAM(s) Oral at bedtime  folic acid 1 milliGRAM(s) Oral daily  levETIRAcetam  IVPB 500 milliGRAM(s) IV Intermittent every 12 hours  melatonin 5 milliGRAM(s) Oral at bedtime  memantine 5 milliGRAM(s) Oral two times a day  metoprolol tartrate 50 milliGRAM(s) Oral <User Schedule>  modafinil 100 milliGRAM(s) Oral <User Schedule>  Nephro-roma 1 Tablet(s) Oral daily  polyethylene glycol 3350 17 Gram(s) Oral daily  senna 2 Tablet(s) Oral at bedtime  sodium chloride 0.9% lock flush 3 milliLiter(s) IV Push every 8 hours  sodium chloride 0.9%. 1000 milliLiter(s) (75 mL/Hr) IV Continuous <Continuous>    MEDICATIONS  (PRN):  acetaminophen     Tablet .. 650 milliGRAM(s) Oral every 6 hours PRN Temp greater or equal to 38C (100.4F), Mild Pain (1 - 3)  hydrALAZINE Injectable 10 milliGRAM(s) IV Push every 2 hours PRN SBP > 160 mmHg  labetalol Injectable 10 milliGRAM(s) IV Push every 2 hours PRN Systolic blood pressure > 160  ondansetron Injectable 4 milliGRAM(s) IV Push every 6 hours PRN Nausea and/or Vomiting  oxyCODONE    IR 5 milliGRAM(s) Oral every 4 hours PRN Moderate Pain (4 - 6)  oxyCODONE    IR 10 milliGRAM(s) Oral every 4 hours PRN Severe Pain (7 - 10)      RADIOLOGY & ADDITIONAL TESTS:   DEUCE BALL    [id number]    83y      Male    CC: s/p fall    INTERVAL HPI/OVERNIGHT EVENTS: pt seen and examined. downgraded from neurosx last night. pt denies pain, ha, n/v    REVIEW OF SYSTEMS:    CONSTITUTIONAL: No weight loss  RESPIRATORY: No cough, wheezing, hemoptysis; No shortness of breath  CARDIOVASCULAR: No chest pain, palpitations  GASTROINTESTINAL: No abdominal or epigastric pain. No nausea, vomiting    Vital Signs Last 24 Hrs  T(C): 36.4 (11 Dec 2023 08:00), Max: 37.9 (10 Dec 2023 17:53)  T(F): 97.6 (11 Dec 2023 08:00), Max: 100.2 (10 Dec 2023 17:53)  HR: 76 (11 Dec 2023 16:00) (76 - 125)  BP: 129/64 (11 Dec 2023 16:00) (96/62 - 130/67)  BP(mean): 82 (11 Dec 2023 06:00) (75 - 90)  RR: 17 (11 Dec 2023 16:00) (14 - 25)  SpO2: 98% (11 Dec 2023 16:00) (94% - 98%)    Parameters below as of 11 Dec 2023 06:00  Patient On (Oxygen Delivery Method): room air        PHYSICAL EXAM:    GENERAL: NAD  CHEST/LUNG: Clear to auscultation bilaterally; respirations unlabored on RA   HEART: S1S2+, Regular rate and rhythm  ABDOMEN: Soft, Nontender, Nondistended; Bowel sounds present  SKIN: warm, dry   NEURO: drowsy but responsive, following commands; strength 3/5 RUE, RLE; withdraws to pain LUE/LLE  PSYCH: normal affect     LABS:                        7.6    6.79  )-----------( 126      ( 11 Dec 2023 06:23 )             23.5     12-11    138  |  104  |  23.3<H>  ----------------------------<  132<H>  4.0   |  21.0<L>  |  0.93    Ca    8.5      11 Dec 2023 06:23  Phos  2.5     12-10  Mg     1.8     12-10        Urinalysis Basic - ( 11 Dec 2023 06:23 )    Color: x / Appearance: x / SG: x / pH: x  Gluc: 132 mg/dL / Ketone: x  / Bili: x / Urobili: x   Blood: x / Protein: x / Nitrite: x   Leuk Esterase: x / RBC: x / WBC x   Sq Epi: x / Non Sq Epi: x / Bacteria: x          MEDICATIONS  (STANDING):  atorvastatin 80 milliGRAM(s) Oral at bedtime  cefTRIAXone Injectable. 1000 milliGRAM(s) IV Push every 24 hours  diltiazem    Tablet 60 milliGRAM(s) Oral <User Schedule>  doxazosin 2 milliGRAM(s) Oral at bedtime  folic acid 1 milliGRAM(s) Oral daily  levETIRAcetam  IVPB 500 milliGRAM(s) IV Intermittent every 12 hours  melatonin 5 milliGRAM(s) Oral at bedtime  memantine 5 milliGRAM(s) Oral two times a day  metoprolol tartrate 50 milliGRAM(s) Oral <User Schedule>  modafinil 100 milliGRAM(s) Oral <User Schedule>  Nephro-roma 1 Tablet(s) Oral daily  polyethylene glycol 3350 17 Gram(s) Oral daily  senna 2 Tablet(s) Oral at bedtime  sodium chloride 0.9% lock flush 3 milliLiter(s) IV Push every 8 hours  sodium chloride 0.9%. 1000 milliLiter(s) (75 mL/Hr) IV Continuous <Continuous>    MEDICATIONS  (PRN):  acetaminophen     Tablet .. 650 milliGRAM(s) Oral every 6 hours PRN Temp greater or equal to 38C (100.4F), Mild Pain (1 - 3)  hydrALAZINE Injectable 10 milliGRAM(s) IV Push every 2 hours PRN SBP > 160 mmHg  labetalol Injectable 10 milliGRAM(s) IV Push every 2 hours PRN Systolic blood pressure > 160  ondansetron Injectable 4 milliGRAM(s) IV Push every 6 hours PRN Nausea and/or Vomiting  oxyCODONE    IR 5 milliGRAM(s) Oral every 4 hours PRN Moderate Pain (4 - 6)  oxyCODONE    IR 10 milliGRAM(s) Oral every 4 hours PRN Severe Pain (7 - 10)      RADIOLOGY & ADDITIONAL TESTS:

## 2023-12-11 NOTE — CHART NOTE - NSCHARTNOTEFT_GEN_A_CORE
Called to evaluate patient for renewal of Right Wrist (secured) restraint.  Patient has been on restraints for continuing to actively pull and attempt to touch dressing on head where craniotomy was done. Pt needing constant redirection.   Will renew Level 1 restraint order to prevent further harm to self.   Other interventions attempted: de-escalation, orientation check, environment modification, medication assessment  Patient was assessed for current physical and psychological risk factors as well as special needs. At present there are no medical conditions or limitations that would place this patient at risk while in restraints.  Type of restraint: R secured wrist  Behavioral criteria for discontinuation of restraint as per orders. Please re-evaluate in 24 hrs.

## 2023-12-11 NOTE — PROGRESS NOTE ADULT - ASSESSMENT
82 y/o male with PMH of HTN, CAD s/p PCO, RCC s/p left nephrectomy, bladder Ca, BPH, CHF, Vertigo, HLD, AAA s/p EVAR, parox Afib, h/o HIT, Alzheimer transferred s/p fall with Rt frontal IPH. Initially had hemicraniectomy course complicated by RVR, aspiration PNA, hypoxic respiratory failure, LUE DVT and questionable increase in AAA size. Started on AC with waxing/waning mental status prompting head imaging with evidence of new bleed. Cardiology following. Neurology consulted after MRI showed stroke. Echo done concerning for mobile echodensity on aortic valve and strokes in multiple arterial territories; WHIT recommended but as noted documented but family declined. Patient was downgraded to medical floor and later upgraded to neuro ICU after cranioplasty; subgaleal drain was removed 12/10. Patient found to have low grade fever, tachycardic, blood culture sent, UA noted for pyuria. Now downgraded to medicine team 12/10    Pyuria   fever   -UA reviewed, no ucx sent   -f/u bcx   -monitor temp, wbc   -cont rocephin     IPH  -s/p R hemicraniotomy and cranioplasty   -repeat CTH stable   -additional repeat CTH 12/11 for change in neuro exam; stable   -s/p subgaleal drain removed 12/10   -neurosx following   -cont keppra bid     paroxysmal afib  HTN, HLD   CAD   -cont metoprolol, diltiazem BID   -cont statin   -cont prn labetalol, hydralazine   -holding eliquis   -holding asa, plavix until cleared by neurosx     Acute DVT   -Ac on hold as above   -avoid heparin products 2/2 hx of HIT   -BLE SCDs     Normocytic anemia   -cont to monitor   -f/u am cbc     AAA s/p EVAR   -imaging reviewed by vascular sx   -no intervention planned at present     DONNIE resolved   -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 Initially had hemicraniectomy course complicated by RVR, aspiration PNA, hypoxic respiratory failure, LUE DVT and questionable increase in AAA size. Started on AC with waxing/waning mental status prompting head imaging with evidence of new bleed. Cardiology following. Neurology consulted after MRI showed stroke. Echo done concerning for mobile echodensity on aortic valve and strokes in multiple arterial territories; WHIT recommended but as noted documented but family declined. Patient was downgraded to medical floor and later upgraded to neuro ICU after cranioplasty; subgaleal drain was removed 12/10. Patient found to have low grade fever, tachycardic, blood culture sent, UA noted for pyuria. Now downgraded to medicine team 12/10    Pyuria   fever   -UA reviewed, no ucx sent   -f/u bcx   -monitor temp, wbc   -cont rocephin     IPH  -s/p R hemicraniotomy and cranioplasty   -repeat CTH stable   -additional repeat CTH 12/11 for change in neuro exam; stable   -s/p subgaleal drain removed 12/10   -neurosx following   -cont keppra bid     paroxysmal afib  HTN, HLD   CAD   -cont metoprolol, diltiazem BID   -cont statin   -cont prn labetalol, hydralazine   -holding eliquis   -holding asa, plavix until cleared by neurosx     Acute DVT   -Ac on hold as above   -avoid heparin products 2/2 hx of HIT   -BLE SCDs     Normocytic anemia   -cont to monitor   -f/u am cbc     AAA s/p EVAR   -imaging reviewed by vascular sx   -no intervention planned at present     DONNIE resolved   -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

## 2023-12-11 NOTE — PROGRESS NOTE ADULT - SUBJECTIVE AND OBJECTIVE BOX
HPI: 82y M with PMH HTN, CAD s/p stents, renal cell carcinoma status post left nephrectomy, bladder CA, BPH, CHF, LBBB, vertigo,  HLD, 5.5cm AAA without rupture post EVAR, paroxysmal A-fib on Eliquis, Plavix, and aspirin, early stage Alzheimer dementia transferred from [facility] s/p unwitnessed fall with head strike at home found by wife on floor at 8am. Patient brought to urgent care by wife and had 5 staples to posterior scalp but was instructed to go to nearest ED.  CT head at [facility] showed R frontal IPH, SDH, SAH at 9:00. CTA stroke protocol not performed at [facility]. Patient BIB on 6L NC.  Pt GCS 15 on arrival, A&Ox2 on arrival. After initial assessment, patient noted to be vomiting enroute to CT scanner.    INTERVAL Events: 83M w/ PMH HTN, CAD s/p stents on ASA/Plavix, presented to [facility] 11/10/23 s/p unwitnessed fall with head strike at home found by wife, found with multicompartment ICH, s/p R craniectomy 11/10/23, course significant for Klebsiella pneumonia, hypernatremia, lethargy, LUE DVT, new anterior falcine SDH, downgraded to SDU 11/28.  Patient is now s/p R cranioplasty POD#4. He was seen this morning on rounds with neurosurgery team, seen lying comfortably in bed. His subgaleal drain was removed on 12/10. Tolerating diet. Passing gas/BM. Denies headache, weakness, numbness, n/v/d, chills, chest pain, SOB.     Vital Signs Last 24 Hrs  T(C): 36.4 (11 Dec 2023 08:00), Max: 37.9 (10 Dec 2023 17:53)  T(F): 97.6 (11 Dec 2023 08:00), Max: 100.2 (10 Dec 2023 17:53)  HR: 93 (11 Dec 2023 06:00) (83 - 138)  BP: 104/69 (11 Dec 2023 06:00) (100/60 - 132/85)  BP(mean): 82 (11 Dec 2023 06:00) (75 - 97)  RR: 16 (11 Dec 2023 06:00) (14 - 25)  SpO2: 96% (11 Dec 2023 06:00) (94% - 98%)    Parameters below as of 11 Dec 2023 06:00  Patient On (Oxygen Delivery Method): room air    Physical Exam:  General: NAD, NCAT, resting comfortably in bed  Lungs: respirations unlabored on RA  Neuro: AOX1-2 self & hospital, OE spontaneously, follows simple commands, PERRL, EOMI, sensation intact distally, RUE / RLE 4/5 strength, LUE / LLE w/d to noxious stimuli, no focal deficits appreciated              HPI: 82y M with PMH HTN, CAD s/p stents, renal cell carcinoma status post left nephrectomy, bladder CA, BPH, CHF, LBBB, vertigo,  HLD, 5.5cm AAA without rupture post EVAR, paroxysmal A-fib on Eliquis, Plavix, and aspirin, early stage Alzheimer dementia transferred from [facility] s/p unwitnessed fall with head strike at home found by wife on floor at 8am. Patient brought to urgent care by wife and had 5 staples to posterior scalp but was instructed to go to nearest ED.  CT head at [facility] showed R frontal IPH, SDH, SAH at 9:00. CTA stroke protocol not performed at [facility]. Patient BIB on 6L NC.  Pt GCS 15 on arrival, A&Ox2 on arrival. After initial assessment, patient noted to be vomiting enroute to CT scanner.    INTERVAL Events: 83M w/ PMH HTN, CAD s/p stents on ASA/Plavix, presented to [facility] 11/10/23 s/p unwitnessed fall with head strike at home found by wife, found with multicompartment ICH, s/p R craniectomy 11/10/23, course significant for Klebsiella pneumonia, hypernatremia, lethargy, LUE DVT, new anterior falcine SDH, downgraded to SDU 11/28.  Patient is now s/p R cranioplasty POD#4. He was seen this morning on rounds with neurosurgery team, seen lying comfortably in bed. His subgaleal drain was removed on 12/10. Tolerating diet. Passing gas/BM. Denies headache, weakness, numbness, n/v/d, chills, chest pain, SOB.     Vital Signs Last 24 Hrs  T(C): 36.4 (11 Dec 2023 08:00), Max: 37.9 (10 Dec 2023 17:53)  T(F): 97.6 (11 Dec 2023 08:00), Max: 100.2 (10 Dec 2023 17:53)  HR: 93 (11 Dec 2023 06:00) (83 - 138)  BP: 104/69 (11 Dec 2023 06:00) (100/60 - 132/85)  BP(mean): 82 (11 Dec 2023 06:00) (75 - 97)  RR: 16 (11 Dec 2023 06:00) (14 - 25)  SpO2: 96% (11 Dec 2023 06:00) (94% - 98%)    Parameters below as of 11 Dec 2023 06:00  Patient On (Oxygen Delivery Method): room air    Physical Exam:  General: NAD, NCAT, resting comfortably in bed  Lungs: respirations unlabored on RA  Neuro: AOX1-2 self & hospital, OE spontaneously, follows simple commands, PERRL, EOMI, sensation intact distally, RUE / RLE 4/5 strength, LUE / LLE w/d to noxious stimuli, no focal deficits appreciated

## 2023-12-11 NOTE — PROGRESS NOTE ADULT - SUBJECTIVE AND OBJECTIVE BOX
Holding ASA and plavix   AC on hold   Cardiology re-evaluated wide complex tachycardia, atrial tachycardia with underlying conduction delay   Cef for UTI     FUNCTIONAL PROGRESS  12/8 PT     Bed Mobility: Supine to Sit:     · Level of [place]	maximum assist (25% patients effort)  · Physical Assist/Nonphysical Assist	1 person assist; nonverbal cues (demo/gestures); verbal cues    Bed Mobility Analysis:     · Bed Mobility Limitations	decreased ability to use arms for pushing/pulling; decreased ability to use legs for bridging/pushing  · Impairments Contributing to Impaired Bed Mobility	impaired balance; decreased ROM; decreased strength; impaired coordination; impaired postural control    Transfer: Sit to Stand:     · Level of [place]	maximum assist (25% patients effort)  · Physical Assist/Nonphysical Assist	2 person assist    Transfer: Stand to Sit:     · Level of [place]	maximum assist (25% patients effort)  · Physical Assist/Nonphysical Assist	2 person assist    Sit/Stand Transfer Safety Analysis:     · Impairments Contributing to Impaired Transfers	impaired balance; impaired coordination; impaired postural control; decreased ROM; decreased strength    Gait Skills:     · Level of [place]	maximum assist (25% patients effort)  · Gait Distance	bed to chair    Gait Analysis:     · Gait Pattern Used	swing-to gait  · Gait Deviations Noted	decreased trace; decreased step length; decreased stride length  · Impairments Contributing to Gait Deviations	impaired balance; impaired coordination; impaired motor control; impaired postural control; decreased strength    OT 12/8   Bathing Training:     · Level of [place]	dependent (less than 25% patients effort)    Upper Body Dressing Training:     · Level of [place]	dependent (less than 25% patients effort); to don gown    Lower Body Dressing Training:     · Level of [place]	dependent (less than 25% patients effort); to don socks    Toilet Hygiene Training:     · Level of [place]	dependent (less than 25% patients effort); secondary to hall    Grooming Training:     · Level of [place]	maximum assist (25% patients effort)  · Physical Assist/Nonphysical Assist	1 person assist    Eating/Self-Feeding Training:     · Level of [place]	minimum assist (75% patients effort)  · Physical Assist/Nonphysical Assist	1 person assist      12/5 SLP   Overall Progress Summary    Progress Summary: Chart reviewed. Discussed diet tolerance monitoring w/nsg, Kristi. RN indicating Pt w/adequate oral intake, with no overt s/s penetration or aspiration of easy to chew w/moderately thick liquids (as per rxs from [facility] completed 12/4/23). SLP will follow for dysphagia tx as schedule permits.     VITALS  T(C): 36.4 (12-11-23 @ 08:00), Max: 37.9 (12-10-23 @ 17:53)  HR: 93 (12-11-23 @ 06:00) (83 - 138)  BP: 104/69 (12-11-23 @ 06:00) (100/60 - 132/85)  RR: 16 (12-11-23 @ 06:00) (14 - 25)  SpO2: 96% (12-11-23 @ 06:00) (94% - 98%)  Wt(kg): --    MEDICATIONS   acetaminophen     Tablet .. 650 milliGRAM(s) every 6 hours PRN  atorvastatin 80 milliGRAM(s) at bedtime  cefTRIAXone Injectable. 1000 milliGRAM(s) every 24 hours  diltiazem    Tablet 60 milliGRAM(s) <User Schedule>  doxazosin 2 milliGRAM(s) at bedtime  folic acid 1 milliGRAM(s) daily  hydrALAZINE Injectable 10 milliGRAM(s) every 2 hours PRN  labetalol Injectable 10 milliGRAM(s) every 2 hours PRN  levETIRAcetam  IVPB 500 milliGRAM(s) every 12 hours  melatonin 5 milliGRAM(s) at bedtime  metoprolol tartrate 50 milliGRAM(s) <User Schedule>  modafinil 100 milliGRAM(s) <User Schedule>  Nephro-roma 1 Tablet(s) daily  ondansetron Injectable 4 milliGRAM(s) every 6 hours PRN  oxyCODONE    IR 5 milliGRAM(s) every 4 hours PRN  oxyCODONE    IR 10 milliGRAM(s) every 4 hours PRN  polyethylene glycol 3350 17 Gram(s) daily  senna 2 Tablet(s) at bedtime  sodium chloride 0.9% lock flush 3 milliLiter(s) every 8 hours  sodium chloride 0.9%. 1000 milliLiter(s) <Continuous>      RECENT LABS/IMAGING  - Reviewed Today                        7.6    6.79  )-----------( 126      ( 11 Dec 2023 06:23 )             23.5     12-11    138  |  104  |  23.3<H>  ----------------------------<  132<H>  4.0   |  21.0<L>  |  0.93    Ca    8.5      11 Dec 2023 06:23  Phos  2.5     12-10  Mg     1.8     12-10        Urinalysis Basic - ( 11 Dec 2023 06:23 )    Color: x / Appearance: x / SG: x / pH: x  Gluc: 132 mg/dL / Ketone: x  / Bili: x / Urobili: x   Blood: x / Protein: x / Nitrite: x   Leuk Esterase: x / RBC: x / WBC x   Sq Epi: x / Non Sq Epi: x / Bacteria: x        MR Brain 11/13 - RIGHT frontoparietal craniectomy with underlying intracranial hemorrhage and edema within the RIGHT frontal lobe. Overlying small RIGHT subdural hemorrhage is also unchanged. Scant hemorrhage in the dependent portions of the BILATERAL lateral ventricles and minimal subarachnoid hemorrhage seen in the BILATERAL frontal and parietal lobes. Moderate periventricular and deep white matter ischemia is noted. Encephalomalacia and gliosis seen in the anterior frontal lobes bilaterally. Tiny acute lacunar infarction in the LEFT cerebellum and tiny acute cortical infarction in the RIGHT parietal lobe. Restricted diffusion also noted about the surgical cavity suggesting infarcted parenchyma.    CXR 11/15 - There is mild pulmonary venous congestion. Left retrocardiac/lower lobe opacity could represent associated pneumonia.    HEAD CT 11/16 - Improved to stable multicompartment intracranial hemorrhages. Right-sided subdural hygroma, larger in size.    US V DOPPLER BLE 11/16 - No evidence of deep venous thrombosis in either lower extremity.    HEAD CT 11/22 - Mixed attenuating subdural hematoma again seen with some expected postoperative changes. Evolving area of parenchymal hemorrhage involving the right frontal region.    HEAD CT 11/23 - Stable multicompartment intracranial hemorrhages. Stable right-sided subdural collection.    HEAD CT 11/29 - Stable intracranial hemorrhages.    HEAD CT 12/2  Right hemicraniectomy. Right subdural fluid attenuation collection as seen on the prior. Decreased conspicuity to regions of acute hemorrhage in the right high frontal parasagittal region as well as stable appearance to hemorrhage in the right insular posterior frontal region in association with lucency as seen on the prior study    HEAD CT 12/5 - Unchanged evolving hemorrhage within the RIGHT frontal lobe, medially and laterally with associated edema. RIGHT frontoparietal hemicraniectomy is again noted with unchanged extra-axial. Mild to moderate periventricular white matter ischemia noted.    HEAD CT 12/8 -  Interval right cerebral cranioplasty and duroplasty with expected postoperative findings, including layering extra-axial hemorrhage. Unchanged parenchymal hemorrhage. Scalp soft tissue swelling with scalp drain.    HEAD CT 12/9 - Unchanged fluid collection subjacent to the RIGHT frontoparietal cranioplasty. The RIGHT subdural hematoma appears smaller in size with minimal residual fluid and minimal pneumocephalus. Evolving infarction again noted in the RIGHT frontal lobe.    ----------------------------------------------------------------------------------------  PHYSICAL EXAM  Constitutional - NAD, Comfortable   HEENT - Right craniotomy   Extremities - No peripheral edema   Neurologic Exam -                    Cognitive - Interactive, awake      Communication - (+) confusion, interactive      Motor - Flaccid paralysis LUE/LLE   Psych - Confused   ----------------------------------------------------------------------------------------  ASSESSMENT/PLAN  83yMale with functional deficits after a fall sustaining multicompartmental intracranial bleeding  [facility]fulness - Provigil 100mg Q8AM (12/2)  Alzheimer's Dementia - Recommend restarting Namenda 5mg BID, Seroquel PRN   CAD, PAFIB - Cardizem, Lopressor, Lipitor, Hydralazine, Labetalol  HTN - Hydralazine, labetalol   Dysphagia - Pureed/mod thick   Sleep - Melatonin    Oropharyngeal Dysphagia - Regular   DVT PPX - SCD, Recommend restarting DVT ppx when cleared by NSGY   Rehab/Impaired mobility and function - Continuous hospitalization is crucial for managing the patient's acute medical issues, which have significantly impacted their mobility, quality of life, and function. Rehabilitation recommendations will be based on the patient's functional progress and their ability to participate in and tolerate therapeutic interventions, which may change over time. Maintaining ongoing mobilization by the staff is imperative to prevent secondary medical complications and associated health issues related to debility.    Given patient's medical diagnosis, functional status, and potential for progress, he is likely a good candidate for [facility] placement. He currently DOES NOT meet the criteria for acute inpatient rehabilitation and would not tolerate 3h of intensive PT/OT/SLP daily. Discharge recommendations are subject to change and PM&R team will continue to follow to monitor progress while in the hospital.          Holding ASA and plavix   AC on hold   Cardiology re-evaluated wide complex tachycardia, atrial tachycardia with underlying conduction delay   Cef for UTI     FUNCTIONAL PROGRESS  12/8 PT     Bed Mobility: Supine to Sit:     · Level of [place]	maximum assist (25% patients effort)  · Physical Assist/Nonphysical Assist	1 person assist; nonverbal cues (demo/gestures); verbal cues    Bed Mobility Analysis:     · Bed Mobility Limitations	decreased ability to use arms for pushing/pulling; decreased ability to use legs for bridging/pushing  · Impairments Contributing to Impaired Bed Mobility	impaired balance; decreased ROM; decreased strength; impaired coordination; impaired postural control    Transfer: Sit to Stand:     · Level of [place]	maximum assist (25% patients effort)  · Physical Assist/Nonphysical Assist	2 person assist    Transfer: Stand to Sit:     · Level of [place]	maximum assist (25% patients effort)  · Physical Assist/Nonphysical Assist	2 person assist    Sit/Stand Transfer Safety Analysis:     · Impairments Contributing to Impaired Transfers	impaired balance; impaired coordination; impaired postural control; decreased ROM; decreased strength    Gait Skills:     · Level of [place]	maximum assist (25% patients effort)  · Gait Distance	bed to chair    Gait Analysis:     · Gait Pattern Used	swing-to gait  · Gait Deviations Noted	decreased trace; decreased step length; decreased stride length  · Impairments Contributing to Gait Deviations	impaired balance; impaired coordination; impaired motor control; impaired postural control; decreased strength    OT 12/8   Bathing Training:     · Level of [place]	dependent (less than 25% patients effort)    Upper Body Dressing Training:     · Level of [place]	dependent (less than 25% patients effort); to don gown    Lower Body Dressing Training:     · Level of [place]	dependent (less than 25% patients effort); to don socks    Toilet Hygiene Training:     · Level of [place]	dependent (less than 25% patients effort); secondary to hall    Grooming Training:     · Level of [place]	maximum assist (25% patients effort)  · Physical Assist/Nonphysical Assist	1 person assist    Eating/Self-Feeding Training:     · Level of [place]	minimum assist (75% patients effort)  · Physical Assist/Nonphysical Assist	1 person assist      12/5 SLP   Overall Progress Summary    Progress Summary: Chart reviewed. Discussed diet tolerance monitoring w/nsg, Kristi. RN indicating Pt w/adequate oral intake, with no overt s/s penetration or aspiration of easy to chew w/moderately thick liquids (as per rxs from [facility] completed 12/4/23). SLP will follow for dysphagia tx as schedule permits.     VITALS  T(C): 36.4 (12-11-23 @ 08:00), Max: 37.9 (12-10-23 @ 17:53)  HR: 93 (12-11-23 @ 06:00) (83 - 138)  BP: 104/69 (12-11-23 @ 06:00) (100/60 - 132/85)  RR: 16 (12-11-23 @ 06:00) (14 - 25)  SpO2: 96% (12-11-23 @ 06:00) (94% - 98%)  Wt(kg): --    MEDICATIONS   acetaminophen     Tablet .. 650 milliGRAM(s) every 6 hours PRN  atorvastatin 80 milliGRAM(s) at bedtime  cefTRIAXone Injectable. 1000 milliGRAM(s) every 24 hours  diltiazem    Tablet 60 milliGRAM(s) <User Schedule>  doxazosin 2 milliGRAM(s) at bedtime  folic acid 1 milliGRAM(s) daily  hydrALAZINE Injectable 10 milliGRAM(s) every 2 hours PRN  labetalol Injectable 10 milliGRAM(s) every 2 hours PRN  levETIRAcetam  IVPB 500 milliGRAM(s) every 12 hours  melatonin 5 milliGRAM(s) at bedtime  metoprolol tartrate 50 milliGRAM(s) <User Schedule>  modafinil 100 milliGRAM(s) <User Schedule>  Nephro-roma 1 Tablet(s) daily  ondansetron Injectable 4 milliGRAM(s) every 6 hours PRN  oxyCODONE    IR 5 milliGRAM(s) every 4 hours PRN  oxyCODONE    IR 10 milliGRAM(s) every 4 hours PRN  polyethylene glycol 3350 17 Gram(s) daily  senna 2 Tablet(s) at bedtime  sodium chloride 0.9% lock flush 3 milliLiter(s) every 8 hours  sodium chloride 0.9%. 1000 milliLiter(s) <Continuous>      RECENT LABS/IMAGING  - Reviewed Today                        7.6    6.79  )-----------( 126      ( 11 Dec 2023 06:23 )             23.5     12-11    138  |  104  |  23.3<H>  ----------------------------<  132<H>  4.0   |  21.0<L>  |  0.93    Ca    8.5      11 Dec 2023 06:23  Phos  2.5     12-10  Mg     1.8     12-10        Urinalysis Basic - ( 11 Dec 2023 06:23 )    Color: x / Appearance: x / SG: x / pH: x  Gluc: 132 mg/dL / Ketone: x  / Bili: x / Urobili: x   Blood: x / Protein: x / Nitrite: x   Leuk Esterase: x / RBC: x / WBC x   Sq Epi: x / Non Sq Epi: x / Bacteria: x        MR Brain 11/13 - RIGHT frontoparietal craniectomy with underlying intracranial hemorrhage and edema within the RIGHT frontal lobe. Overlying small RIGHT subdural hemorrhage is also unchanged. Scant hemorrhage in the dependent portions of the BILATERAL lateral ventricles and minimal subarachnoid hemorrhage seen in the BILATERAL frontal and parietal lobes. Moderate periventricular and deep white matter ischemia is noted. Encephalomalacia and gliosis seen in the anterior frontal lobes bilaterally. Tiny acute lacunar infarction in the LEFT cerebellum and tiny acute cortical infarction in the RIGHT parietal lobe. Restricted diffusion also noted about the surgical cavity suggesting infarcted parenchyma.    CXR 11/15 - There is mild pulmonary venous congestion. Left retrocardiac/lower lobe opacity could represent associated pneumonia.    HEAD CT 11/16 - Improved to stable multicompartment intracranial hemorrhages. Right-sided subdural hygroma, larger in size.    US V DOPPLER BLE 11/16 - No evidence of deep venous thrombosis in either lower extremity.    HEAD CT 11/22 - Mixed attenuating subdural hematoma again seen with some expected postoperative changes. Evolving area of parenchymal hemorrhage involving the right frontal region.    HEAD CT 11/23 - Stable multicompartment intracranial hemorrhages. Stable right-sided subdural collection.    HEAD CT 11/29 - Stable intracranial hemorrhages.    HEAD CT 12/2  Right hemicraniectomy. Right subdural fluid attenuation collection as seen on the prior. Decreased conspicuity to regions of acute hemorrhage in the right high frontal parasagittal region as well as stable appearance to hemorrhage in the right insular posterior frontal region in association with lucency as seen on the prior study    HEAD CT 12/5 - Unchanged evolving hemorrhage within the RIGHT frontal lobe, medially and laterally with associated edema. RIGHT frontoparietal hemicraniectomy is again noted with unchanged extra-axial. Mild to moderate periventricular white matter ischemia noted.    HEAD CT 12/8 -  Interval right cerebral cranioplasty and duroplasty with expected postoperative findings, including layering extra-axial hemorrhage. Unchanged parenchymal hemorrhage. Scalp soft tissue swelling with scalp drain.    HEAD CT 12/9 - Unchanged fluid collection subjacent to the RIGHT frontoparietal cranioplasty. The RIGHT subdural hematoma appears smaller in size with minimal residual fluid and minimal pneumocephalus. Evolving infarction again noted in the RIGHT frontal lobe.    ----------------------------------------------------------------------------------------  PHYSICAL EXAM  Constitutional - NAD, Comfortable   HEENT - Right craniotomy   Extremities - No peripheral edema   Neurologic Exam -                    Cognitive - Interactive, awake      Communication - (+) confusion, interactive      Motor - Flaccid paralysis LUE/LLE   Psych - Confused   ----------------------------------------------------------------------------------------  ASSESSMENT/PLAN  83yMale with functional deficits after a fall sustaining multicompartmental intracranial bleeding  [facility] - Provigil 100mg Q8AM (12/2)  Alzheimer's Dementia - Recommend restarting Namenda 5mg BID, Seroquel PRN   CAD, PAFIB - Cardizem, Lopressor, Lipitor, Hydralazine, Labetalol  HTN - Hydralazine, labetalol   Dysphagia - Pureed/mod thick   Sleep - Melatonin    Oropharyngeal Dysphagia - Regular   DVT PPX - SCD, Recommend restarting DVT ppx when cleared by NSGY   Rehab/Impaired mobility and function - Continuous hospitalization is crucial for managing the patient's acute medical issues, which have significantly impacted their mobility, quality of life, and function. Rehabilitation recommendations will be based on the patient's functional progress and their ability to participate in and tolerate therapeutic interventions, which may change over time. Maintaining ongoing mobilization by the staff is imperative to prevent secondary medical complications and associated health issues related to debility.    Given patient's medical diagnosis, functional status, and potential for progress, he is likely a good candidate for [facility] placement. He currently DOES NOT meet the criteria for acute inpatient rehabilitation and would not tolerate 3h of intensive PT/OT/SLP daily. Discharge recommendations are subject to change and PM&R team will continue to follow to monitor progress while in the hospital.          Lethargic, intermittently following commands   Extensor posturing Left UE   Holding ASA/plavix   Cardiology re-evaluated wide complex tachycardia, atrial tachycardia with underlying conduction delay   Cef for UTI   Tachycardic, increased temp     FUNCTIONAL PROGRESS  12/8 PT     Bed Mobility: Supine to Sit:     · Level of [place]	maximum assist (25% patients effort)  · Physical Assist/Nonphysical Assist	1 person assist; nonverbal cues (demo/gestures); verbal cues    Bed Mobility Analysis:     · Bed Mobility Limitations	decreased ability to use arms for pushing/pulling; decreased ability to use legs for bridging/pushing  · Impairments Contributing to Impaired Bed Mobility	impaired balance; decreased ROM; decreased strength; impaired coordination; impaired postural control    Transfer: Sit to Stand:     · Level of [place]	maximum assist (25% patients effort)  · Physical Assist/Nonphysical Assist	2 person assist    Transfer: Stand to Sit:     · Level of [place]	maximum assist (25% patients effort)  · Physical Assist/Nonphysical Assist	2 person assist    Sit/Stand Transfer Safety Analysis:     · Impairments Contributing to Impaired Transfers	impaired balance; impaired coordination; impaired postural control; decreased ROM; decreased strength    Gait Skills:     · Level of [place]	maximum assist (25% patients effort)  · Gait Distance	bed to chair    Gait Analysis:     · Gait Pattern Used	swing-to gait  · Gait Deviations Noted	decreased trace; decreased step length; decreased stride length  · Impairments Contributing to Gait Deviations	impaired balance; impaired coordination; impaired motor control; impaired postural control; decreased strength    OT 12/8   Bathing Training:     · Level of [place]	dependent (less than 25% patients effort)    Upper Body Dressing Training:     · Level of [place]	dependent (less than 25% patients effort); to don gown    Lower Body Dressing Training:     · Level of [place]	dependent (less than 25% patients effort); to don socks    Toilet Hygiene Training:     · Level of [place]	dependent (less than 25% patients effort); secondary to hall    Grooming Training:     · Level of [place]	maximum assist (25% patients effort)  · Physical Assist/Nonphysical Assist	1 person assist    Eating/Self-Feeding Training:     · Level of [place]	minimum assist (75% patients effort)  · Physical Assist/Nonphysical Assist	1 person assist      12/5 SLP   Overall Progress Summary    Progress Summary: Chart reviewed. Discussed diet tolerance monitoring w/nsgKristi. RN indicating Pt w/adequate oral intake, with no overt s/s penetration or aspiration of easy to chew w/moderately thick liquids (as per rxs from [facility] completed 12/4/23). SLP will follow for dysphagia tx as schedule permits.     VITALS  T(C): 36.4 (12-11-23 @ 08:00), Max: 37.9 (12-10-23 @ 17:53)  HR: 93 (12-11-23 @ 06:00) (83 - 138)  BP: 104/69 (12-11-23 @ 06:00) (100/60 - 132/85)  RR: 16 (12-11-23 @ 06:00) (14 - 25)  SpO2: 96% (12-11-23 @ 06:00) (94% - 98%)  Wt(kg): --    MEDICATIONS   acetaminophen     Tablet .. 650 milliGRAM(s) every 6 hours PRN  atorvastatin 80 milliGRAM(s) at bedtime  cefTRIAXone Injectable. 1000 milliGRAM(s) every 24 hours  diltiazem    Tablet 60 milliGRAM(s) <User Schedule>  doxazosin 2 milliGRAM(s) at bedtime  folic acid 1 milliGRAM(s) daily  hydrALAZINE Injectable 10 milliGRAM(s) every 2 hours PRN  labetalol Injectable 10 milliGRAM(s) every 2 hours PRN  levETIRAcetam  IVPB 500 milliGRAM(s) every 12 hours  melatonin 5 milliGRAM(s) at bedtime  metoprolol tartrate 50 milliGRAM(s) <User Schedule>  modafinil 100 milliGRAM(s) <User Schedule>  Nephro-roma 1 Tablet(s) daily  ondansetron Injectable 4 milliGRAM(s) every 6 hours PRN  oxyCODONE    IR 5 milliGRAM(s) every 4 hours PRN  oxyCODONE    IR 10 milliGRAM(s) every 4 hours PRN  polyethylene glycol 3350 17 Gram(s) daily  senna 2 Tablet(s) at bedtime  sodium chloride 0.9% lock flush 3 milliLiter(s) every 8 hours  sodium chloride 0.9%. 1000 milliLiter(s) <Continuous>      RECENT LABS/IMAGING  - Reviewed Today                        7.6    6.79  )-----------( 126      ( 11 Dec 2023 06:23 )             23.5     12-11    138  |  104  |  23.3<H>  ----------------------------<  132<H>  4.0   |  21.0<L>  |  0.93    Ca    8.5      11 Dec 2023 06:23  Phos  2.5     12-10  Mg     1.8     12-10        Urinalysis Basic - ( 11 Dec 2023 06:23 )    Color: x / Appearance: x / SG: x / pH: x  Gluc: 132 mg/dL / Ketone: x  / Bili: x / Urobili: x   Blood: x / Protein: x / Nitrite: x   Leuk Esterase: x / RBC: x / WBC x   Sq Epi: x / Non Sq Epi: x / Bacteria: x        MR Brain 11/13 - RIGHT frontoparietal craniectomy with underlying intracranial hemorrhage and edema within the RIGHT frontal lobe. Overlying small RIGHT subdural hemorrhage is also unchanged. Scant hemorrhage in the dependent portions of the BILATERAL lateral ventricles and minimal subarachnoid hemorrhage seen in the BILATERAL frontal and parietal lobes. Moderate periventricular and deep white matter ischemia is noted. Encephalomalacia and gliosis seen in the anterior frontal lobes bilaterally. Tiny acute lacunar infarction in the LEFT cerebellum and tiny acute cortical infarction in the RIGHT parietal lobe. Restricted diffusion also noted about the surgical cavity suggesting infarcted parenchyma.    CXR 11/15 - There is mild pulmonary venous congestion. Left retrocardiac/lower lobe opacity could represent associated pneumonia.    HEAD CT 11/16 - Improved to stable multicompartment intracranial hemorrhages. Right-sided subdural hygroma, larger in size.    US V DOPPLER BLE 11/16 - No evidence of deep venous thrombosis in either lower extremity.    HEAD CT 11/22 - Mixed attenuating subdural hematoma again seen with some expected postoperative changes. Evolving area of parenchymal hemorrhage involving the right frontal region.    HEAD CT 11/23 - Stable multicompartment intracranial hemorrhages. Stable right-sided subdural collection.    HEAD CT 11/29 - Stable intracranial hemorrhages.    HEAD CT 12/2  Right hemicraniectomy. Right subdural fluid attenuation collection as seen on the prior. Decreased conspicuity to regions of acute hemorrhage in the right high frontal parasagittal region as well as stable appearance to hemorrhage in the right insular posterior frontal region in association with lucency as seen on the prior study    HEAD CT 12/5 - Unchanged evolving hemorrhage within the RIGHT frontal lobe, medially and laterally with associated edema. RIGHT frontoparietal hemicraniectomy is again noted with unchanged extra-axial. Mild to moderate periventricular white matter ischemia noted.    HEAD CT 12/8 -  Interval right cerebral cranioplasty and duroplasty with expected postoperative findings, including layering extra-axial hemorrhage. Unchanged parenchymal hemorrhage. Scalp soft tissue swelling with scalp drain.    HEAD CT 12/9 - Unchanged fluid collection subjacent to the RIGHT frontoparietal cranioplasty. The RIGHT subdural hematoma appears smaller in size with minimal residual fluid and minimal pneumocephalus. Evolving infarction again noted in the RIGHT frontal lobe.    ----------------------------------------------------------------------------------------  PHYSICAL EXAM  Constitutional - NAD, Comfortable   HEENT - Right craniotomy, R>L head tilt    Extremities - No peripheral edema   Neurologic Exam -                    Cognitive - Lethargic, mumbling       Communication - (+) confused       Motor - Extensor posturing LUE, moves RUE/RLE on command   Psych - Confused   ----------------------------------------------------------------------------------------  ASSESSMENT/PLAN  83yMale with functional deficits after a fall sustaining multicompartmental intracranial bleeding  Wakefulness - Provigil 100mg Q8AM (12/2)  Alzheimer's Dementia - Restarted Namenda 5mg BID   CAD, PAFIB - Cardizem, Lopressor, Lipitor, Hydralazine, Labetalol  HTN - Hydralazine, labetalol   Pyuria - Cef   Sleep - Melatonin    Diet - NPO   DVT PPX - SCD, Recommend restarting DVT ppx when cleared by NSGY   Rehab/Impaired mobility and function - Continuous hospitalization is crucial for managing the patient's acute medical issues (AMS, intracranial bleeding, debility), which have significantly impacted their mobility, quality of life, and function. Rehabilitation recommendations will be based on the patient's functional progress and their ability to participate in and tolerate therapeutic interventions, which may change over time. Maintaining ongoing mobilization by the staff is imperative to prevent secondary medical complications and associated health issues related to debility.    Given patient's medical diagnosis, functional status, and potential for progress, he is likely a good candidate for [facility] placement. He currently DOES NOT meet the criteria for acute inpatient rehabilitation and would not tolerate 3h of intensive PT/OT/SLP daily. Discharge recommendations are subject to change and PM&R team will continue to follow to monitor progress while in the hospital.            Lethargic, intermittently following commands   Extensor posturing Left UE   Holding ASA/plavix   Cardiology re-evaluated wide complex tachycardia, atrial tachycardia with underlying conduction delay   Cef for UTI   Tachycardic, increased temp     FUNCTIONAL PROGRESS  12/8 PT     Bed Mobility: Supine to Sit:     · Level of [place]	maximum assist (25% patients effort)  · Physical Assist/Nonphysical Assist	1 person assist; nonverbal cues (demo/gestures); verbal cues    Bed Mobility Analysis:     · Bed Mobility Limitations	decreased ability to use arms for pushing/pulling; decreased ability to use legs for bridging/pushing  · Impairments Contributing to Impaired Bed Mobility	impaired balance; decreased ROM; decreased strength; impaired coordination; impaired postural control    Transfer: Sit to Stand:     · Level of [place]	maximum assist (25% patients effort)  · Physical Assist/Nonphysical Assist	2 person assist    Transfer: Stand to Sit:     · Level of [place]	maximum assist (25% patients effort)  · Physical Assist/Nonphysical Assist	2 person assist    Sit/Stand Transfer Safety Analysis:     · Impairments Contributing to Impaired Transfers	impaired balance; impaired coordination; impaired postural control; decreased ROM; decreased strength    Gait Skills:     · Level of [place]	maximum assist (25% patients effort)  · Gait Distance	bed to chair    Gait Analysis:     · Gait Pattern Used	swing-to gait  · Gait Deviations Noted	decreased trace; decreased step length; decreased stride length  · Impairments Contributing to Gait Deviations	impaired balance; impaired coordination; impaired motor control; impaired postural control; decreased strength    OT 12/8   Bathing Training:     · Level of [place]	dependent (less than 25% patients effort)    Upper Body Dressing Training:     · Level of [place]	dependent (less than 25% patients effort); to don gown    Lower Body Dressing Training:     · Level of [place]	dependent (less than 25% patients effort); to don socks    Toilet Hygiene Training:     · Level of [place]	dependent (less than 25% patients effort); secondary to hall    Grooming Training:     · Level of [place]	maximum assist (25% patients effort)  · Physical Assist/Nonphysical Assist	1 person assist    Eating/Self-Feeding Training:     · Level of [place]	minimum assist (75% patients effort)  · Physical Assist/Nonphysical Assist	1 person assist      12/5 SLP   Overall Progress Summary    Progress Summary: Chart reviewed. Discussed diet tolerance monitoring w/nsgKristi. RN indicating Pt w/adequate oral intake, with no overt s/s penetration or aspiration of easy to chew w/moderately thick liquids (as per rxs from [facility] completed 12/4/23). SLP will follow for dysphagia tx as schedule permits.     VITALS  T(C): 36.4 (12-11-23 @ 08:00), Max: 37.9 (12-10-23 @ 17:53)  HR: 93 (12-11-23 @ 06:00) (83 - 138)  BP: 104/69 (12-11-23 @ 06:00) (100/60 - 132/85)  RR: 16 (12-11-23 @ 06:00) (14 - 25)  SpO2: 96% (12-11-23 @ 06:00) (94% - 98%)  Wt(kg): --    MEDICATIONS   acetaminophen     Tablet .. 650 milliGRAM(s) every 6 hours PRN  atorvastatin 80 milliGRAM(s) at bedtime  cefTRIAXone Injectable. 1000 milliGRAM(s) every 24 hours  diltiazem    Tablet 60 milliGRAM(s) <User Schedule>  doxazosin 2 milliGRAM(s) at bedtime  folic acid 1 milliGRAM(s) daily  hydrALAZINE Injectable 10 milliGRAM(s) every 2 hours PRN  labetalol Injectable 10 milliGRAM(s) every 2 hours PRN  levETIRAcetam  IVPB 500 milliGRAM(s) every 12 hours  melatonin 5 milliGRAM(s) at bedtime  metoprolol tartrate 50 milliGRAM(s) <User Schedule>  modafinil 100 milliGRAM(s) <User Schedule>  Nephro-roma 1 Tablet(s) daily  ondansetron Injectable 4 milliGRAM(s) every 6 hours PRN  oxyCODONE    IR 5 milliGRAM(s) every 4 hours PRN  oxyCODONE    IR 10 milliGRAM(s) every 4 hours PRN  polyethylene glycol 3350 17 Gram(s) daily  senna 2 Tablet(s) at bedtime  sodium chloride 0.9% lock flush 3 milliLiter(s) every 8 hours  sodium chloride 0.9%. 1000 milliLiter(s) <Continuous>      RECENT LABS/IMAGING  - Reviewed Today                        7.6    6.79  )-----------( 126      ( 11 Dec 2023 06:23 )             23.5     12-11    138  |  104  |  23.3<H>  ----------------------------<  132<H>  4.0   |  21.0<L>  |  0.93    Ca    8.5      11 Dec 2023 06:23  Phos  2.5     12-10  Mg     1.8     12-10        Urinalysis Basic - ( 11 Dec 2023 06:23 )    Color: x / Appearance: x / SG: x / pH: x  Gluc: 132 mg/dL / Ketone: x  / Bili: x / Urobili: x   Blood: x / Protein: x / Nitrite: x   Leuk Esterase: x / RBC: x / WBC x   Sq Epi: x / Non Sq Epi: x / Bacteria: x        MR Brain 11/13 - RIGHT frontoparietal craniectomy with underlying intracranial hemorrhage and edema within the RIGHT frontal lobe. Overlying small RIGHT subdural hemorrhage is also unchanged. Scant hemorrhage in the dependent portions of the BILATERAL lateral ventricles and minimal subarachnoid hemorrhage seen in the BILATERAL frontal and parietal lobes. Moderate periventricular and deep white matter ischemia is noted. Encephalomalacia and gliosis seen in the anterior frontal lobes bilaterally. Tiny acute lacunar infarction in the LEFT cerebellum and tiny acute cortical infarction in the RIGHT parietal lobe. Restricted diffusion also noted about the surgical cavity suggesting infarcted parenchyma.    CXR 11/15 - There is mild pulmonary venous congestion. Left retrocardiac/lower lobe opacity could represent associated pneumonia.    HEAD CT 11/16 - Improved to stable multicompartment intracranial hemorrhages. Right-sided subdural hygroma, larger in size.    US V DOPPLER BLE 11/16 - No evidence of deep venous thrombosis in either lower extremity.    HEAD CT 11/22 - Mixed attenuating subdural hematoma again seen with some expected postoperative changes. Evolving area of parenchymal hemorrhage involving the right frontal region.    HEAD CT 11/23 - Stable multicompartment intracranial hemorrhages. Stable right-sided subdural collection.    HEAD CT 11/29 - Stable intracranial hemorrhages.    HEAD CT 12/2  Right hemicraniectomy. Right subdural fluid attenuation collection as seen on the prior. Decreased conspicuity to regions of acute hemorrhage in the right high frontal parasagittal region as well as stable appearance to hemorrhage in the right insular posterior frontal region in association with lucency as seen on the prior study    HEAD CT 12/5 - Unchanged evolving hemorrhage within the RIGHT frontal lobe, medially and laterally with associated edema. RIGHT frontoparietal hemicraniectomy is again noted with unchanged extra-axial. Mild to moderate periventricular white matter ischemia noted.    HEAD CT 12/8 -  Interval right cerebral cranioplasty and duroplasty with expected postoperative findings, including layering extra-axial hemorrhage. Unchanged parenchymal hemorrhage. Scalp soft tissue swelling with scalp drain.    HEAD CT 12/9 - Unchanged fluid collection subjacent to the RIGHT frontoparietal cranioplasty. The RIGHT subdural hematoma appears smaller in size with minimal residual fluid and minimal pneumocephalus. Evolving infarction again noted in the RIGHT frontal lobe.    ----------------------------------------------------------------------------------------  PHYSICAL EXAM  Constitutional - NAD, Comfortable   HEENT - Right craniotomy, R>L head tilt    Extremities - No peripheral edema   Neurologic Exam -                    Cognitive - Lethargic, mumbling       Communication - (+) confused       Motor - Extensor posturing LUE, moves RUE/RLE on command   Psych - Confused   ----------------------------------------------------------------------------------------  ASSESSMENT/PLAN  83yMale with functional deficits after a fall sustaining multicompartmental intracranial bleeding  Wakefulness - Provigil 100mg Q8AM (12/2)  Alzheimer's Dementia - Restarted Namenda 5mg BID   CAD, PAFIB - Cardizem, Lopressor, Lipitor, Hydralazine, Labetalol  HTN - Hydralazine, labetalol   Pyuria - Cef   Sleep - Melatonin    Diet - NPO   DVT PPX - SCD, Recommend restarting DVT ppx when cleared by NSGY   Rehab/Impaired mobility and function - Continuous hospitalization is crucial for managing the patient's acute medical issues (AMS, intracranial bleeding, debility), which have significantly impacted their mobility, quality of life, and function. Rehabilitation recommendations will be based on the patient's functional progress and their ability to participate in and tolerate therapeutic interventions, which may change over time. Maintaining ongoing mobilization by the staff is imperative to prevent secondary medical complications and associated health issues related to debility.    Given patient's medical diagnosis, functional status, and potential for progress, he is likely a good candidate for [facility] placement. He currently DOES NOT meet the criteria for acute inpatient rehabilitation and would not tolerate 3h of intensive PT/OT/SLP daily. Discharge recommendations are subject to change and PM&R team will continue to follow to monitor progress while in the hospital.

## 2023-12-11 NOTE — PROGRESS NOTE ADULT - ASSESSMENT
Assessment: 83M w/ PMH HTN, CAD s/p stents on ASA/Plavix, afib on Eliquis s/p unwitnessed fall +HS at home found by wife, found with multicompartmental ICH, s/p R craniectomy 11/10/23. Now s/p cranioplasty on 12/07, POD#4.    Plan:  - Q2h neuro checks  - Subgaleal drain removed 12/10  - Pain control Tylenol/Oxy PRN  - Continue Keppra 500mg q12   - Normotension  - Advance diet as tolerated- soft bite sized w/ mod thick liquids   - Record BMs; Miralax, Senna  - DVT ppx with SCDs  - PT/OT  - Dispo planning to [facility]  - Wound: May remove dressing on POD#3, 12/10. May shower on POD#5, using gentle shampoo, pat dry, leave open to air.    The above patient and plan was discussed with Dr. Larios bedside, Dr. Larios to missael.

## 2023-12-11 NOTE — PROGRESS NOTE ADULT - ATTENDING COMMENTS
Patient seen and examined, discussed patient with Dr. Tovar and agree with recommendations.    Rehab/Impaired mobility and function - Patient continues to require hospitalization for the above diagnoses and ongoing active management of comorbid complications   that are substantially impairing functional ability and impairing quality of life necessitating ongoing medical management of these complications.     Progress continues to be limited despite some improvement in his wakefulness.    When medically optimized, based on the patient's diagnosis, current functional status, and potential for progress, recommend [facility], patient DOES NOT meet acute inpatient rehabilitation criteria. Patient needs a more prolonged stay to achieve transition to community living and would not be able to tolerate a comprehensive/intense rehab program of 3hours/day.     Will continue to follow. Rehab recommendations are dependent on how functional progress changes as well as how patient continues to participate and tolerate therapeutic interventions, which may change. Recommend ongoing mobilization by staff to maintain cardiopulmonary function and prevention of secondary complications related to debility. Discussed the specific management and recommendations above with rehab clinical care team/rehab liaison.

## 2023-12-12 LAB
ANION GAP SERPL CALC-SCNC: 11 MMOL/L — SIGNIFICANT CHANGE UP (ref 5–17)
ANION GAP SERPL CALC-SCNC: 11 MMOL/L — SIGNIFICANT CHANGE UP (ref 5–17)
BASOPHILS # BLD AUTO: 0.03 K/UL — SIGNIFICANT CHANGE UP (ref 0–0.2)
BASOPHILS # BLD AUTO: 0.03 K/UL — SIGNIFICANT CHANGE UP (ref 0–0.2)
BASOPHILS NFR BLD AUTO: 0.5 % — SIGNIFICANT CHANGE UP (ref 0–2)
BASOPHILS NFR BLD AUTO: 0.5 % — SIGNIFICANT CHANGE UP (ref 0–2)
BUN SERPL-MCNC: 18.4 MG/DL — SIGNIFICANT CHANGE UP (ref 8–20)
BUN SERPL-MCNC: 18.4 MG/DL — SIGNIFICANT CHANGE UP (ref 8–20)
CALCIUM SERPL-MCNC: 8.4 MG/DL — SIGNIFICANT CHANGE UP (ref 8.4–10.5)
CALCIUM SERPL-MCNC: 8.4 MG/DL — SIGNIFICANT CHANGE UP (ref 8.4–10.5)
CHLORIDE SERPL-SCNC: 105 MMOL/L — SIGNIFICANT CHANGE UP (ref 96–108)
CHLORIDE SERPL-SCNC: 105 MMOL/L — SIGNIFICANT CHANGE UP (ref 96–108)
CO2 SERPL-SCNC: 21 MMOL/L — LOW (ref 22–29)
CO2 SERPL-SCNC: 21 MMOL/L — LOW (ref 22–29)
CREAT SERPL-MCNC: 0.94 MG/DL — SIGNIFICANT CHANGE UP (ref 0.5–1.3)
CREAT SERPL-MCNC: 0.94 MG/DL — SIGNIFICANT CHANGE UP (ref 0.5–1.3)
EGFR: 81 ML/MIN/1.73M2 — SIGNIFICANT CHANGE UP
EGFR: 81 ML/MIN/1.73M2 — SIGNIFICANT CHANGE UP
EOSINOPHIL # BLD AUTO: 0.25 K/UL — SIGNIFICANT CHANGE UP (ref 0–0.5)
EOSINOPHIL # BLD AUTO: 0.25 K/UL — SIGNIFICANT CHANGE UP (ref 0–0.5)
EOSINOPHIL NFR BLD AUTO: 4.3 % — SIGNIFICANT CHANGE UP (ref 0–6)
EOSINOPHIL NFR BLD AUTO: 4.3 % — SIGNIFICANT CHANGE UP (ref 0–6)
GLUCOSE SERPL-MCNC: 97 MG/DL — SIGNIFICANT CHANGE UP (ref 70–99)
GLUCOSE SERPL-MCNC: 97 MG/DL — SIGNIFICANT CHANGE UP (ref 70–99)
HCT VFR BLD CALC: 22 % — LOW (ref 39–50)
HCT VFR BLD CALC: 22 % — LOW (ref 39–50)
HGB BLD-MCNC: 7.1 G/DL — LOW (ref 13–17)
HGB BLD-MCNC: 7.1 G/DL — LOW (ref 13–17)
IMM GRANULOCYTES NFR BLD AUTO: 0.7 % — SIGNIFICANT CHANGE UP (ref 0–0.9)
IMM GRANULOCYTES NFR BLD AUTO: 0.7 % — SIGNIFICANT CHANGE UP (ref 0–0.9)
LYMPHOCYTES # BLD AUTO: 1.09 K/UL — SIGNIFICANT CHANGE UP (ref 1–3.3)
LYMPHOCYTES # BLD AUTO: 1.09 K/UL — SIGNIFICANT CHANGE UP (ref 1–3.3)
LYMPHOCYTES # BLD AUTO: 18.9 % — SIGNIFICANT CHANGE UP (ref 13–44)
LYMPHOCYTES # BLD AUTO: 18.9 % — SIGNIFICANT CHANGE UP (ref 13–44)
MAGNESIUM SERPL-MCNC: 1.6 MG/DL — SIGNIFICANT CHANGE UP (ref 1.6–2.6)
MAGNESIUM SERPL-MCNC: 1.6 MG/DL — SIGNIFICANT CHANGE UP (ref 1.6–2.6)
MCHC RBC-ENTMCNC: 32.1 PG — SIGNIFICANT CHANGE UP (ref 27–34)
MCHC RBC-ENTMCNC: 32.1 PG — SIGNIFICANT CHANGE UP (ref 27–34)
MCHC RBC-ENTMCNC: 32.3 GM/DL — SIGNIFICANT CHANGE UP (ref 32–36)
MCHC RBC-ENTMCNC: 32.3 GM/DL — SIGNIFICANT CHANGE UP (ref 32–36)
MCV RBC AUTO: 99.5 FL — SIGNIFICANT CHANGE UP (ref 80–100)
MCV RBC AUTO: 99.5 FL — SIGNIFICANT CHANGE UP (ref 80–100)
MONOCYTES # BLD AUTO: 0.5 K/UL — SIGNIFICANT CHANGE UP (ref 0–0.9)
MONOCYTES # BLD AUTO: 0.5 K/UL — SIGNIFICANT CHANGE UP (ref 0–0.9)
MONOCYTES NFR BLD AUTO: 8.7 % — SIGNIFICANT CHANGE UP (ref 2–14)
MONOCYTES NFR BLD AUTO: 8.7 % — SIGNIFICANT CHANGE UP (ref 2–14)
NEUTROPHILS # BLD AUTO: 3.85 K/UL — SIGNIFICANT CHANGE UP (ref 1.8–7.4)
NEUTROPHILS # BLD AUTO: 3.85 K/UL — SIGNIFICANT CHANGE UP (ref 1.8–7.4)
NEUTROPHILS NFR BLD AUTO: 66.9 % — SIGNIFICANT CHANGE UP (ref 43–77)
NEUTROPHILS NFR BLD AUTO: 66.9 % — SIGNIFICANT CHANGE UP (ref 43–77)
PHOSPHATE SERPL-MCNC: 2.4 MG/DL — SIGNIFICANT CHANGE UP (ref 2.4–4.7)
PHOSPHATE SERPL-MCNC: 2.4 MG/DL — SIGNIFICANT CHANGE UP (ref 2.4–4.7)
PLATELET # BLD AUTO: 122 K/UL — LOW (ref 150–400)
PLATELET # BLD AUTO: 122 K/UL — LOW (ref 150–400)
POTASSIUM SERPL-MCNC: 3.9 MMOL/L — SIGNIFICANT CHANGE UP (ref 3.5–5.3)
POTASSIUM SERPL-MCNC: 3.9 MMOL/L — SIGNIFICANT CHANGE UP (ref 3.5–5.3)
POTASSIUM SERPL-SCNC: 3.9 MMOL/L — SIGNIFICANT CHANGE UP (ref 3.5–5.3)
POTASSIUM SERPL-SCNC: 3.9 MMOL/L — SIGNIFICANT CHANGE UP (ref 3.5–5.3)
RBC # BLD: 2.21 M/UL — LOW (ref 4.2–5.8)
RBC # BLD: 2.21 M/UL — LOW (ref 4.2–5.8)
RBC # FLD: 14.4 % — SIGNIFICANT CHANGE UP (ref 10.3–14.5)
RBC # FLD: 14.4 % — SIGNIFICANT CHANGE UP (ref 10.3–14.5)
SODIUM SERPL-SCNC: 137 MMOL/L — SIGNIFICANT CHANGE UP (ref 135–145)
SODIUM SERPL-SCNC: 137 MMOL/L — SIGNIFICANT CHANGE UP (ref 135–145)
WBC # BLD: 5.76 K/UL — SIGNIFICANT CHANGE UP (ref 3.8–10.5)
WBC # BLD: 5.76 K/UL — SIGNIFICANT CHANGE UP (ref 3.8–10.5)
WBC # FLD AUTO: 5.76 K/UL — SIGNIFICANT CHANGE UP (ref 3.8–10.5)
WBC # FLD AUTO: 5.76 K/UL — SIGNIFICANT CHANGE UP (ref 3.8–10.5)

## 2023-12-12 PROCEDURE — 99233 SBSQ HOSP IP/OBS HIGH 50: CPT

## 2023-12-12 PROCEDURE — 99232 SBSQ HOSP IP/OBS MODERATE 35: CPT

## 2023-12-12 PROCEDURE — 93970 EXTREMITY STUDY: CPT | Mod: 26

## 2023-12-12 RX ORDER — MAGNESIUM SULFATE 500 MG/ML
2 VIAL (ML) INJECTION ONCE
Refills: 0 | Status: COMPLETED | OUTPATIENT
Start: 2023-12-12 | End: 2023-12-12

## 2023-12-12 RX ADMIN — Medication 50 MILLIGRAM(S): at 23:42

## 2023-12-12 RX ADMIN — SODIUM CHLORIDE 3 MILLILITER(S): 9 INJECTION INTRAMUSCULAR; INTRAVENOUS; SUBCUTANEOUS at 05:37

## 2023-12-12 RX ADMIN — Medication 60 MILLIGRAM(S): at 05:03

## 2023-12-12 RX ADMIN — SODIUM CHLORIDE 3 MILLILITER(S): 9 INJECTION INTRAMUSCULAR; INTRAVENOUS; SUBCUTANEOUS at 12:56

## 2023-12-12 RX ADMIN — MEMANTINE HYDROCHLORIDE 5 MILLIGRAM(S): 10 TABLET ORAL at 17:11

## 2023-12-12 RX ADMIN — Medication 50 MILLIGRAM(S): at 00:36

## 2023-12-12 RX ADMIN — POLYETHYLENE GLYCOL 3350 17 GRAM(S): 17 POWDER, FOR SOLUTION ORAL at 12:58

## 2023-12-12 RX ADMIN — MEMANTINE HYDROCHLORIDE 5 MILLIGRAM(S): 10 TABLET ORAL at 05:04

## 2023-12-12 RX ADMIN — Medication 60 MILLIGRAM(S): at 17:11

## 2023-12-12 RX ADMIN — ATORVASTATIN CALCIUM 80 MILLIGRAM(S): 80 TABLET, FILM COATED ORAL at 21:37

## 2023-12-12 RX ADMIN — SODIUM CHLORIDE 75 MILLILITER(S): 9 INJECTION INTRAMUSCULAR; INTRAVENOUS; SUBCUTANEOUS at 21:38

## 2023-12-12 RX ADMIN — CEFTRIAXONE 1000 MILLIGRAM(S): 500 INJECTION, POWDER, FOR SOLUTION INTRAMUSCULAR; INTRAVENOUS at 21:34

## 2023-12-12 RX ADMIN — Medication 5 MILLIGRAM(S): at 21:37

## 2023-12-12 RX ADMIN — Medication 2 MILLIGRAM(S): at 21:37

## 2023-12-12 RX ADMIN — Medication 25 GRAM(S): at 10:43

## 2023-12-12 RX ADMIN — LEVETIRACETAM 400 MILLIGRAM(S): 250 TABLET, FILM COATED ORAL at 05:04

## 2023-12-12 RX ADMIN — LEVETIRACETAM 400 MILLIGRAM(S): 250 TABLET, FILM COATED ORAL at 17:17

## 2023-12-12 RX ADMIN — Medication 50 MILLIGRAM(S): at 12:08

## 2023-12-12 RX ADMIN — Medication 1 MILLIGRAM(S): at 12:08

## 2023-12-12 RX ADMIN — SENNA PLUS 2 TABLET(S): 8.6 TABLET ORAL at 21:37

## 2023-12-12 RX ADMIN — SODIUM CHLORIDE 75 MILLILITER(S): 9 INJECTION INTRAMUSCULAR; INTRAVENOUS; SUBCUTANEOUS at 05:05

## 2023-12-12 RX ADMIN — Medication 1 TABLET(S): at 12:09

## 2023-12-12 RX ADMIN — SODIUM CHLORIDE 3 MILLILITER(S): 9 INJECTION INTRAMUSCULAR; INTRAVENOUS; SUBCUTANEOUS at 03:24

## 2023-12-12 RX ADMIN — MODAFINIL 100 MILLIGRAM(S): 200 TABLET ORAL at 08:00

## 2023-12-12 RX ADMIN — SODIUM CHLORIDE 3 MILLILITER(S): 9 INJECTION INTRAMUSCULAR; INTRAVENOUS; SUBCUTANEOUS at 23:06

## 2023-12-12 NOTE — PROGRESS NOTE ADULT - ASSESSMENT
Assessment: 83M w/ PMH HTN, CAD s/p stents on ASA/Plavix, afib on Eliquis s/p unwitnessed fall +HS at home found by wife, found with multicompartment ICH, s/p R craniectomy 11/10/23. Now s/p cranioplasty on 12/07, POD#5    Plan:    - Q2h neuro checks  - Subgaleal drain removed 12/10  - Pain control Tylenol/Oxy PRN  - Continue Keppra 500mg q12   - Normotension  - Advance diet as tolerated- soft bite sized w/ mod thick liquids   - Record BMs; Miralax, Senna  - DVT ppx with SCDs  - PT/OT  - Dispo planning to [facility]  - Wound: May remove dressing on POD#3, 12/10. May shower on POD#5, using gentle shampoo, pat dry, leave open to air.  -  D/w Dr. Larios Assessment: 83M w/ PMH HTN, CAD s/p stents on ASA/Plavix, afib on Eliquis s/p unwitnessed fall +HS at home found by wife, found with multicompartment ICH, s/p R craniectomy 11/10/23. Now s/p cranioplasty on 12/07, POD#5    Plan:    - Q2h neuro checks  - Subgaleal drain removed 12/10  - Pain control Tylenol/Oxy PRN  - Continue Keppra 500mg q12   - Normotension  - Advance diet as tolerated- soft bite sized w/ mod thick liquids   - Record BMs; Miralax, Senna  - DVT ppx with SCDs  - Cleared for ASA/ Plavix from neurosurgery perspective  - PT/OT  - Dispo planning to [facility]  - Wound: May remove dressing on POD#3, 12/10. May shower on POD#5, using gentle shampoo, pat dry, leave open to air.  -  D/w Dr. Larios Assessment: 83M w/ PMH HTN, CAD s/p stents on ASA/Plavix, afib on Eliquis s/p unwitnessed fall +HS at home found by wife, found with multicompartment ICH, s/p R craniectomy 11/10/23. Now s/p cranioplasty on 12/07, POD#5    Plan:    - Q2h neuro checks  - Subgaleal drain removed 12/10  - Pain control Tylenol/Oxy PRN  - Continue Keppra 500mg q12   - Normotension  - Advance diet as tolerated- soft bite sized w/ mod thick liquids   - Record BMs; Miralax, Senna  - DVT ppx with SCDs  - Cleared for ASA/ Plavix from neurosurgery perspective  - PT/OT  - Dispo planning to [facility]  - Wound: May remove dressing on POD#3, 12/10. May shower on POD#5, using gentle shampoo, pat dry, leave open to air.  -  D/w Dr. Larios Assessment: 83M w/ PMH HTN, CAD s/p stents on ASA/Plavix, afib on Eliquis s/p unwitnessed fall +HS at home found by wife, found with multicompartment ICH, s/p R craniectomy 11/10/23. Now s/p cranioplasty on 12/07, POD#5    Plan:    - Q2h neuro checks  - Subgaleal drain removed 12/10  - Pain control Tylenol/Oxy PRN  - Continue Keppra 500mg q12   - Normotension  - Advance diet as tolerated- soft bite sized w/ mod thick liquids   - Record BMs; Miralax, Senna  - DVT ppx with SCDs  - May restart ASA and Plavix 1 week post op (2 days from today, 12/14)  - PT/OT  - Dispo planning to [facility]  - Wound: May remove dressing on POD#3, 12/10. May shower on POD#5, using gentle shampoo, pat dry, leave open to air.  -  D/w Dr. Larios Assessment: 83M w/ PMH HTN, CAD s/p stents on ASA/Plavix, afib on Eliquis s/p unwitnessed fall +HS at home found by wife, found with multicompartment ICH, s/p R craniectomy 11/10/23. Now s/p cranioplasty on 12/07, POD#5    Plan:    - Q2h neuro checks  - Subgaleal drain removed 12/10  - Pain control Tylenol/Oxy PRN  - Continue Keppra 500mg q12   - Normotension  - Advance diet as tolerated- soft bite sized w/ mod thick liquids   - Record BMs; Miralax, Senna  - DVT ppx with SCDs  - May restart ASA and Eliquis per cardio recs, 1 week post op (2 days from today, 12/14)  - PT/OT  - Dispo planning to [facility]  - Wound: May remove dressing on POD#3, 12/10. May shower on POD#5, using gentle shampoo, pat dry, leave open to air.  -  D/w Dr. Larios Assessment: 83M w/ PMH HTN, CAD s/p stents on ASA/Plavix, afib on Eliquis s/p unwitnessed fall +HS at home found by wife, found with multicompartment ICH, s/p R craniectomy 11/10/23. Now s/p cranioplasty on 12/07, POD#5    Plan:    - Q2h neuro checks  - Subgaleal drain removed 12/10  - Pain control Tylenol/Oxy PRN  - Continue Keppra 500mg q12   - Normotension  - Advance diet as tolerated- soft bite sized w/ mod thick liquids   - Record BMs; Miralax, Senna  - DVT ppx with SCDs  - May restart ASA and Eliquis per cardio recs, 1 week post op (2 days from today, 12/14)  - PT/OT  - Dispo planning to [facility]  - Wound: May remove dressing on POD#3, 12/10. May shower on POD#5, using gentle shampoo, pat dry, leave open to air.  -  D/w Dr. Larios

## 2023-12-12 NOTE — PROGRESS NOTE ADULT - ASSESSMENT
84 y/o male with PMH of HTN, CAD s/p PCO, RCC s/p left nephrectomy, bladder Ca, BPH, CHF, Vertigo, HLD, AAA s/p EVAR, parox Afib, h/o HIT, Alzheimer transferred s/p fall with Rt frontal IPH. Initially had hemicraniectomy course complicated by RVR, aspiration PNA, hypoxic respiratory failure, LUE DVT and questionable increase in AAA size. Started on AC with waxing/waning mental status prompting head imaging with evidence of new bleed. Cardiology following. Neurology consulted after MRI showed stroke. Echo done concerning for mobile echodensity on aortic valve and strokes in multiple arterial territories; WHIT recommended but as noted documented but family declined. Patient was downgraded to medical floor and later upgraded to neuro ICU after cranioplasty; subgaleal drain was removed 12/10. Patient found to have low grade fever, tachycardic, blood culture sent, UA noted for pyuria. Now downgraded to medicine team 12/10    Pyuria   fever   -UA reviewed, no ucx sent   -bcx ngtd   -monitor temp, wbc   -cont rocephin     IPH  -s/p decompressive R hemicraniotomy 11/10 and s/p R cranioplasty with replacement of bone flap 12/7  -repeat CTH stable   -additional repeat CTH 12/11 for change in neuro exam; stable   -s/p subgaleal drain removed 12/10   -neurosx following   -cont keppra bid     paroxysmal afib  HTN, HLD   CAD   -cont metoprolol, diltiazem BID   -cont statin   -cont prn labetalol, hydralazine   -holding eliquis as above until cleared by neurosx   -holding asa, plavix until cleared by neurosx     Acute LUE DVT   -US LUE: stable partially occlusive L axillary vein thrombosis and L cephalic vein superficial thrombophlebitis; segmental occlusive thrombosis within 1 of 2 brachial veins AV fistula surrounded by thrombosis vs pseudoaneurysm   -Ac on hold as above   -avoid heparin products 2/2 hx of HIT   -BLE SCDs     Normocytic anemia , worsening   -cont to monitor   -f/u am cbc ; iron studies   -monitor for s/sx active bleeding     AAA s/p EVAR   -imaging reviewed by vascular sx   -no intervention planned at present   -DAPT vs single antiplatelet as above     DONNIE resolved   -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 Initially had hemicraniectomy course complicated by RVR, aspiration PNA, hypoxic respiratory failure, LUE DVT and questionable increase in AAA size. Started on AC with waxing/waning mental status prompting head imaging with evidence of new bleed. Cardiology following. Neurology consulted after MRI showed stroke. Echo done concerning for mobile echodensity on aortic valve and strokes in multiple arterial territories; WHIT recommended but as noted documented but family declined. Patient was downgraded to medical floor and later upgraded to neuro ICU after cranioplasty; subgaleal drain was removed 12/10. Patient found to have low grade fever, tachycardic, blood culture sent, UA noted for pyuria. Now downgraded to medicine team 12/10    Pyuria   fever   -UA reviewed, no ucx sent   -bcx ngtd   -monitor temp, wbc   -cont rocephin     IPH  -s/p decompressive R hemicraniotomy 11/10 and s/p R cranioplasty with replacement of bone flap 12/7  -repeat CTH stable   -additional repeat CTH 12/11 for change in neuro exam; stable   -s/p subgaleal drain removed 12/10   -neurosx following   -cont keppra bid     paroxysmal afib  HTN, HLD   CAD   -cont metoprolol, diltiazem BID   -cont statin   -cont prn labetalol, hydralazine   -holding eliquis as above until cleared by neurosx   -holding asa, plavix until cleared by neurosx     Acute LUE DVT   -US LUE: stable partially occlusive L axillary vein thrombosis and L cephalic vein superficial thrombophlebitis; segmental occlusive thrombosis within 1 of 2 brachial veins AV fistula surrounded by thrombosis vs pseudoaneurysm   -Ac on hold as above   -avoid heparin products 2/2 hx of HIT   -BLE SCDs     Normocytic anemia , worsening   -cont to monitor   -f/u am cbc ; iron studies   -monitor for s/sx active bleeding     AAA s/p EVAR   -imaging reviewed by vascular sx   -no intervention planned at present   -DAPT vs single antiplatelet as above     DONNIE resolved   -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

## 2023-12-12 NOTE — PROGRESS NOTE ADULT - SUBJECTIVE AND OBJECTIVE BOX
Patient with limited wakefulness.  Despite fatigue, able to verbalize frustration.  Also does not want to sit in chair despite benefit.     FUNCTIONAL PROGRESS  12/11 SLP  Speech Language Pathology Recommendations: 1. puree diet w/ moderately thick liquids 2. 1:1 assist 3. STRICT aspiration precautions 4. Pt must be awake/alert & upright for PO5. slow rate, small bites/sips, alternate solids/liquids6. Oral care7. SLP to follow     12/8 PT  Bed Mobility Analysis:     · Bed Mobility Limitations	decreased ability to use arms for pushing/pulling; decreased ability to use legs for bridging/pushing  · Impairments Contributing to Impaired Bed Mobility	impaired balance; decreased ROM; decreased strength; impaired coordination; impaired postural control    Transfer: Sit to Stand:     · Level of [place]	maximum assist (25% patients effort)  · Physical Assist/Nonphysical Assist	2 person assist    Transfer: Stand to Sit:     · Level of [place]	maximum assist (25% patients effort)  · Physical Assist/Nonphysical Assist	2 person assist    Sit/Stand Transfer Safety Analysis:     · Impairments Contributing to Impaired Transfers	impaired balance; impaired coordination; impaired postural control; decreased ROM; decreased strength    Gait Skills:     · Level of [place]	maximum assist (25% patients effort)  · Gait Distance	bed to chair    12/8 OT  Bathing Training:     · Level of [place]	dependent (less than 25% patients effort)    Upper Body Dressing Training:     · Level of [place]	dependent (less than 25% patients effort); to don gown    Lower Body Dressing Training:     · Level of [place]	dependent (less than 25% patients effort); to don socks    Toilet Hygiene Training:     · Level of [place]	dependent (less than 25% patients effort); secondary to hall    Grooming Training:     · Level of [place]	maximum assist (25% patients effort)  · Physical Assist/Nonphysical Assist	1 person assist    Eating/Self-Feeding Training:     · Level of [place]	minimum assist (75% patients effort)  · Physical Assist/Nonphysical Assist	1 person assist    VITALS  T(C): 37 (12-12-23 @ 04:02), Max: 37.1 (12-12-23 @ 04:00)  HR: 86 (12-12-23 @ 04:00) (72 - 89)  BP: 123/77 (12-12-23 @ 04:00) (117/51 - 130/76)  RR: 20 (12-12-23 @ 04:00) (17 - 20)  SpO2: 98% (12-12-23 @ 04:00) (96% - 99%)  Wt(kg): --    MEDICATIONS   acetaminophen     Tablet .. 650 milliGRAM(s) every 6 hours PRN  atorvastatin 80 milliGRAM(s) at bedtime  cefTRIAXone Injectable. 1000 milliGRAM(s) every 24 hours  diltiazem    Tablet 60 milliGRAM(s) <User Schedule>  doxazosin 2 milliGRAM(s) at bedtime  folic acid 1 milliGRAM(s) daily  hydrALAZINE Injectable 10 milliGRAM(s) every 2 hours PRN  labetalol Injectable 10 milliGRAM(s) every 2 hours PRN  levETIRAcetam  IVPB 500 milliGRAM(s) every 12 hours  melatonin 5 milliGRAM(s) at bedtime  memantine 5 milliGRAM(s) two times a day  metoprolol tartrate 50 milliGRAM(s) <User Schedule>  modafinil 100 milliGRAM(s) <User Schedule>  Nephro-roma 1 Tablet(s) daily  ondansetron Injectable 4 milliGRAM(s) every 6 hours PRN  oxyCODONE    IR 5 milliGRAM(s) every 4 hours PRN  oxyCODONE    IR 10 milliGRAM(s) every 4 hours PRN  polyethylene glycol 3350 17 Gram(s) daily  senna 2 Tablet(s) at bedtime  sodium chloride 0.9% lock flush 3 milliLiter(s) every 8 hours  sodium chloride 0.9%. 1000 milliLiter(s) <Continuous>      RECENT LABS/IMAGING  - Reviewed Today                        7.6    6.79  )-----------( 126      ( 11 Dec 2023 06:23 )             23.5     12-11    138  |  104  |  23.3<H>  ----------------------------<  132<H>  4.0   |  21.0<L>  |  0.93    Ca    8.5      11 Dec 2023 06:23  Phos  2.5     12-10  Mg     1.8     12-10        Urinalysis Basic - ( 11 Dec 2023 06:23 )    Color: x / Appearance: x / SG: x / pH: x  Gluc: 132 mg/dL / Ketone: x  / Bili: x / Urobili: x   Blood: x / Protein: x / Nitrite: x   Leuk Esterase: x / RBC: x / WBC x   Sq Epi: x / Non Sq Epi: x / Bacteria: x                MR Brain 11/13 - RIGHT frontoparietal craniectomy with underlying intracranial hemorrhage and edema within the RIGHT frontal lobe. Overlying small RIGHT subdural hemorrhage is also unchanged. Scant hemorrhage in the dependent portions of the BILATERAL lateral ventricles and minimal subarachnoid hemorrhage seen in the BILATERAL frontal and parietal lobes. Moderate periventricular and deep white matter ischemia is noted. Encephalomalacia and gliosis seen in the anterior frontal lobes bilaterally. Tiny acute lacunar infarction in the LEFT cerebellum and tiny acute cortical infarction in the RIGHT parietal lobe. Restricted diffusion also noted about the surgical cavity suggesting infarcted parenchyma.    CXR 11/15 - There is mild pulmonary venous congestion. Left retrocardiac/lower lobe opacity could represent associated pneumonia.    HEAD CT 11/16 - Improved to stable multicompartment intracranial hemorrhages. Right-sided subdural hygroma, larger in size.    US V DOPPLER BLE 11/16 - No evidence of deep venous thrombosis in either lower extremity.    HEAD CT 11/22 - Mixed attenuating subdural hematoma again seen with some expected postoperative changes. Evolving area of parenchymal hemorrhage involving the right frontal region.    HEAD CT 11/23 - Stable multicompartment intracranial hemorrhages. Stable right-sided subdural collection.    HEAD CT 11/29 - Stable intracranial hemorrhages.    HEAD CT 12/2  Right hemicraniectomy. Right subdural fluid attenuation collection as seen on the prior. Decreased conspicuity to regions of acute hemorrhage in the right high frontal parasagittal region as well as stable appearance to hemorrhage in the right insular posterior frontal region in association with lucency as seen on the prior study    HEAD CT 12/5 - Unchanged evolving hemorrhage within the RIGHT frontal lobe, medially and laterally with associated edema. RIGHT frontoparietal hemicraniectomy is again noted with unchanged extra-axial. Mild to moderate periventricular white matter ischemia noted.    HEAD CT 12/11 - No change since 12/9/2023.  ----------------------------------------------------------------------------------------  PHYSICAL EXAM  Constitutional - NAD, Comfortable   HEENT - Right craniotomy - +Dressing  Extremities - No peripheral edema   Neurologic Exam -                    Cognitive - Awake/Alert     Communication - Hypophonic, delayed processing     Motor - Left hemiplegia  Psych - Somnolent  ----------------------------------------------------------------------------------------  ASSESSMENT/PLAN  83yMale with functional deficits after a fall sustaining multicompartmental intracranial bleeding  Wakefulness - Provigil 100mg Q8AM (12/2)  Seizure PPX - Keppra  Alzheimer's Dementia - Namenda 5mg BID, Seroquel PRN   Pyuria - Rocephin   CAD, PAFIB - Cardizem, Lopressor, Lipitor, Hydralazine, Labetalol  HTN - Hydralazine, labetalol   Dysphagia - Pureed/mod thick   Sleep - Melatonin    Oropharyngeal Dysphagia - Puree & MOD THICK Liquids  Pain - Tylenol, Oxycodone  DVT PPX - SCD   Rehab/Impaired mobility and function - Patient continues to require hospitalization for the above diagnoses and ongoing active management of comorbid complications (IV HTN medications) that are substantially impairing functional ability and impairing quality of life necessitating ongoing medical management of these complications.     Continue to recommend HOLLY, patient DOES NOT meet acute inpatient rehabilitation criteria. Patient needs a more prolonged stay to achieve transition to community living and would not be able to tolerate a comprehensive/intense rehab program of 3hours/day.     Will continue to follow. Rehab recommendations are dependent on how functional progress changes as well as how patient continues to participate and tolerate therapeutic interventions, which may change. Recommend ongoing mobilization by staff to maintain cardiopulmonary function and prevention of secondary complications related to debility. Discussed the specific management and recommendations above with rehab clinical care team/rehab liaison.         Patient with limited wakefulness.  Despite fatigue, able to verbalize frustration.  Also does not want to sit in chair despite benefit.     FUNCTIONAL PROGRESS  12/11 SLP  Speech Language Pathology Recommendations: 1. puree diet w/ moderately thick liquids 2. 1:1 assist 3. STRICT aspiration precautions 4. Pt must be awake/alert & upright for PO5. slow rate, small bites/sips, alternate solids/liquids6. Oral care7. SLP to follow     12/8 PT  Bed Mobility Analysis:     · Bed Mobility Limitations	decreased ability to use arms for pushing/pulling; decreased ability to use legs for bridging/pushing  · Impairments Contributing to Impaired Bed Mobility	impaired balance; decreased ROM; decreased strength; impaired coordination; impaired postural control    Transfer: Sit to Stand:     · Level of [place]	maximum assist (25% patients effort)  · Physical Assist/Nonphysical Assist	2 person assist    Transfer: Stand to Sit:     · Level of [place]	maximum assist (25% patients effort)  · Physical Assist/Nonphysical Assist	2 person assist    Sit/Stand Transfer Safety Analysis:     · Impairments Contributing to Impaired Transfers	impaired balance; impaired coordination; impaired postural control; decreased ROM; decreased strength    Gait Skills:     · Level of [place]	maximum assist (25% patients effort)  · Gait Distance	bed to chair    12/8 OT  Bathing Training:     · Level of [place]	dependent (less than 25% patients effort)    Upper Body Dressing Training:     · Level of [place]	dependent (less than 25% patients effort); to don gown    Lower Body Dressing Training:     · Level of [place]	dependent (less than 25% patients effort); to don socks    Toilet Hygiene Training:     · Level of [place]	dependent (less than 25% patients effort); secondary to hall    Grooming Training:     · Level of [place]	maximum assist (25% patients effort)  · Physical Assist/Nonphysical Assist	1 person assist    Eating/Self-Feeding Training:     · Level of [place]	minimum assist (75% patients effort)  · Physical Assist/Nonphysical Assist	1 person assist    VITALS  T(C): 37 (12-12-23 @ 04:02), Max: 37.1 (12-12-23 @ 04:00)  HR: 86 (12-12-23 @ 04:00) (72 - 89)  BP: 123/77 (12-12-23 @ 04:00) (117/51 - 130/76)  RR: 20 (12-12-23 @ 04:00) (17 - 20)  SpO2: 98% (12-12-23 @ 04:00) (96% - 99%)  Wt(kg): --    MEDICATIONS   acetaminophen     Tablet .. 650 milliGRAM(s) every 6 hours PRN  atorvastatin 80 milliGRAM(s) at bedtime  cefTRIAXone Injectable. 1000 milliGRAM(s) every 24 hours  diltiazem    Tablet 60 milliGRAM(s) <User Schedule>  doxazosin 2 milliGRAM(s) at bedtime  folic acid 1 milliGRAM(s) daily  hydrALAZINE Injectable 10 milliGRAM(s) every 2 hours PRN  labetalol Injectable 10 milliGRAM(s) every 2 hours PRN  levETIRAcetam  IVPB 500 milliGRAM(s) every 12 hours  melatonin 5 milliGRAM(s) at bedtime  memantine 5 milliGRAM(s) two times a day  metoprolol tartrate 50 milliGRAM(s) <User Schedule>  modafinil 100 milliGRAM(s) <User Schedule>  Nephro-roma 1 Tablet(s) daily  ondansetron Injectable 4 milliGRAM(s) every 6 hours PRN  oxyCODONE    IR 5 milliGRAM(s) every 4 hours PRN  oxyCODONE    IR 10 milliGRAM(s) every 4 hours PRN  polyethylene glycol 3350 17 Gram(s) daily  senna 2 Tablet(s) at bedtime  sodium chloride 0.9% lock flush 3 milliLiter(s) every 8 hours  sodium chloride 0.9%. 1000 milliLiter(s) <Continuous>      RECENT LABS/IMAGING  - Reviewed Today                        7.6    6.79  )-----------( 126      ( 11 Dec 2023 06:23 )             23.5     12-11    138  |  104  |  23.3<H>  ----------------------------<  132<H>  4.0   |  21.0<L>  |  0.93    Ca    8.5      11 Dec 2023 06:23  Phos  2.5     12-10  Mg     1.8     12-10        Urinalysis Basic - ( 11 Dec 2023 06:23 )    Color: x / Appearance: x / SG: x / pH: x  Gluc: 132 mg/dL / Ketone: x  / Bili: x / Urobili: x   Blood: x / Protein: x / Nitrite: x   Leuk Esterase: x / RBC: x / WBC x   Sq Epi: x / Non Sq Epi: x / Bacteria: x                MR Brain 11/13 - RIGHT frontoparietal craniectomy with underlying intracranial hemorrhage and edema within the RIGHT frontal lobe. Overlying small RIGHT subdural hemorrhage is also unchanged. Scant hemorrhage in the dependent portions of the BILATERAL lateral ventricles and minimal subarachnoid hemorrhage seen in the BILATERAL frontal and parietal lobes. Moderate periventricular and deep white matter ischemia is noted. Encephalomalacia and gliosis seen in the anterior frontal lobes bilaterally. Tiny acute lacunar infarction in the LEFT cerebellum and tiny acute cortical infarction in the RIGHT parietal lobe. Restricted diffusion also noted about the surgical cavity suggesting infarcted parenchyma.    CXR 11/15 - There is mild pulmonary venous congestion. Left retrocardiac/lower lobe opacity could represent associated pneumonia.    HEAD CT 11/16 - Improved to stable multicompartment intracranial hemorrhages. Right-sided subdural hygroma, larger in size.    US V DOPPLER BLE 11/16 - No evidence of deep venous thrombosis in either lower extremity.    HEAD CT 11/22 - Mixed attenuating subdural hematoma again seen with some expected postoperative changes. Evolving area of parenchymal hemorrhage involving the right frontal region.    HEAD CT 11/23 - Stable multicompartment intracranial hemorrhages. Stable right-sided subdural collection.    HEAD CT 11/29 - Stable intracranial hemorrhages.    HEAD CT 12/2  Right hemicraniectomy. Right subdural fluid attenuation collection as seen on the prior. Decreased conspicuity to regions of acute hemorrhage in the right high frontal parasagittal region as well as stable appearance to hemorrhage in the right insular posterior frontal region in association with lucency as seen on the prior study    HEAD CT 12/5 - Unchanged evolving hemorrhage within the RIGHT frontal lobe, medially and laterally with associated edema. RIGHT frontoparietal hemicraniectomy is again noted with unchanged extra-axial. Mild to moderate periventricular white matter ischemia noted.    HEAD CT 12/11 - No change since 12/9/2023.  ----------------------------------------------------------------------------------------  PHYSICAL EXAM  Constitutional - NAD, Comfortable   HEENT - Right craniotomy - +Dressing  Extremities - No peripheral edema   Neurologic Exam -                    Cognitive - Awake/Alert     Communication - Hypophonic, delayed processing     Motor - Left hemiplegia  Psych - Somnolent  ----------------------------------------------------------------------------------------  ASSESSMENT/PLAN  83yMale with functional deficits after a fall sustaining multicompartmental intracranial bleeding  Wakefulness - Provigil 100mg Q8AM (12/2)  Seizure PPX - Keppra  Alzheimer's Dementia - Namenda 5mg BID, Seroquel PRN   Pyuria - Rocephin   CAD, PAFIB - Cardizem, Lopressor, Lipitor, Hydralazine, Labetalol  HTN - Hydralazine, labetalol   Dysphagia - Pureed/mod thick   Sleep - Melatonin    Oropharyngeal Dysphagia - Puree & MOD THICK Liquids  Pain - Tylenol, Oxycodone  DVT PPX - SCD   Rehab/Impaired mobility and function - Patient continues to require hospitalization for the above diagnoses and ongoing active management of comorbid complications (IV HTN medications) that are substantially impairing functional ability and impairing quality of life necessitating ongoing medical management of these complications.     Continue to recommend HOLLY, patient DOES NOT meet acute inpatient rehabilitation criteria. Patient needs a more prolonged stay to achieve transition to community living and would not be able to tolerate a comprehensive/intense rehab program of 3hours/day.     Will continue to follow. Rehab recommendations are dependent on how functional progress changes as well as how patient continues to participate and tolerate therapeutic interventions, which may change. Recommend ongoing mobilization by staff to maintain cardiopulmonary function and prevention of secondary complications related to debility. Discussed the specific management and recommendations above with rehab clinical care team/rehab liaison.

## 2023-12-12 NOTE — PROGRESS NOTE ADULT - SUBJECTIVE AND OBJECTIVE BOX
Vascular surgery follow up  brief PA note  patient discussed with medical attending  history of EVAR, question regarding resumption of antiplatelets  Pertaining to EVAR/post AAA repair maintenance.. antiplatelets are not absolutely required   Antiplatelets can absolutely resumed if needed for the patient's best medical therapy for CAD      will forward above to the covering Vascular Attending

## 2023-12-12 NOTE — PROGRESS NOTE ADULT - SUBJECTIVE AND OBJECTIVE BOX
DEUCE BALL    [id number]    83y      Male    CC: s/p fall     INTERVAL HPI/OVERNIGHT EVENTS: Pt seen and examined. No new complaints     REVIEW OF SYSTEMS:    CONSTITUTIONAL: No weight loss  RESPIRATORY: No cough, wheezing, hemoptysis; No shortness of breath  CARDIOVASCULAR: No chest pain, palpitations  GASTROINTESTINAL: No abdominal or epigastric pain. No nausea, vomiting    Vital Signs Last 24 Hrs  T(C): 37 (12 Dec 2023 07:43), Max: 37.1 (12 Dec 2023 04:00)  T(F): 98.6 (12 Dec 2023 07:43), Max: 98.8 (12 Dec 2023 04:00)  HR: 90 (12 Dec 2023 07:43) (72 - 90)  BP: 135/59 (12 Dec 2023 07:43) (117/51 - 135/59)  BP(mean): 80 (12 Dec 2023 07:43) (68 - 92)  RR: 10 (12 Dec 2023 07:43) (10 - 20)  SpO2: 98% (12 Dec 2023 07:43) (96% - 99%)    Parameters below as of 12 Dec 2023 07:43  Patient On (Oxygen Delivery Method): room air        PHYSICAL EXAM:    GENERAL: NAD  CHEST/LUNG: Clear to auscultation bilaterally; respirations unlabored on RA   HEART: S1S2+, Regular rate and rhythm  ABDOMEN: Soft, Nontender, Nondistended; Bowel sounds present  SKIN: warm, dry   NEURO: drowsy but responsive, following commands; strength 3/5 RUE, RLE; withdraws to pain LUE/LLE  PSYCH: normal affect     LABS:                        7.1    5.76  )-----------( 122      ( 12 Dec 2023 07:25 )             22.0     12-12    137  |  105  |  18.4  ----------------------------<  97  3.9   |  21.0<L>  |  0.94    Ca    8.4      12 Dec 2023 07:25  Phos  2.4     12-12  Mg     1.6     12-12        Urinalysis Basic - ( 12 Dec 2023 07:25 )    Color: x / Appearance: x / SG: x / pH: x  Gluc: 97 mg/dL / Ketone: x  / Bili: x / Urobili: x   Blood: x / Protein: x / Nitrite: x   Leuk Esterase: x / RBC: x / WBC x   Sq Epi: x / Non Sq Epi: x / Bacteria: x          MEDICATIONS  (STANDING):  atorvastatin 80 milliGRAM(s) Oral at bedtime  cefTRIAXone Injectable. 1000 milliGRAM(s) IV Push every 24 hours  diltiazem    Tablet 60 milliGRAM(s) Oral <User Schedule>  doxazosin 2 milliGRAM(s) Oral at bedtime  folic acid 1 milliGRAM(s) Oral daily  levETIRAcetam  IVPB 500 milliGRAM(s) IV Intermittent every 12 hours  melatonin 5 milliGRAM(s) Oral at bedtime  memantine 5 milliGRAM(s) Oral two times a day  metoprolol tartrate 50 milliGRAM(s) Oral <User Schedule>  modafinil 100 milliGRAM(s) Oral <User Schedule>  Nephro-roma 1 Tablet(s) Oral daily  polyethylene glycol 3350 17 Gram(s) Oral daily  senna 2 Tablet(s) Oral at bedtime  sodium chloride 0.9% lock flush 3 milliLiter(s) IV Push every 8 hours  sodium chloride 0.9%. 1000 milliLiter(s) (75 mL/Hr) IV Continuous <Continuous>    MEDICATIONS  (PRN):  acetaminophen     Tablet .. 650 milliGRAM(s) Oral every 6 hours PRN Temp greater or equal to 38C (100.4F), Mild Pain (1 - 3)  hydrALAZINE Injectable 10 milliGRAM(s) IV Push every 2 hours PRN SBP > 160 mmHg  labetalol Injectable 10 milliGRAM(s) IV Push every 2 hours PRN Systolic blood pressure > 160  ondansetron Injectable 4 milliGRAM(s) IV Push every 6 hours PRN Nausea and/or Vomiting  oxyCODONE    IR 5 milliGRAM(s) Oral every 4 hours PRN Moderate Pain (4 - 6)  oxyCODONE    IR 10 milliGRAM(s) Oral every 4 hours PRN Severe Pain (7 - 10)      RADIOLOGY & ADDITIONAL TESTS:   DEUCE BALL    [id number]    83y      Male    CC: s/p fall     INTERVAL HPI/OVERNIGHT EVENTS: Pt seen and examined. No new complaints     REVIEW OF SYSTEMS:    CONSTITUTIONAL: No weight loss  RESPIRATORY: No cough, wheezing, hemoptysis; No shortness of breath  CARDIOVASCULAR: No chest pain, palpitations  GASTROINTESTINAL: No abdominal or epigastric pain. No nausea, vomiting    Vital Signs Last 24 Hrs  T(C): 37 (12 Dec 2023 07:43), Max: 37.1 (12 Dec 2023 04:00)  T(F): 98.6 (12 Dec 2023 07:43), Max: 98.8 (12 Dec 2023 04:00)  HR: 90 (12 Dec 2023 07:43) (72 - 90)  BP: 135/59 (12 Dec 2023 07:43) (117/51 - 135/59)  BP(mean): 80 (12 Dec 2023 07:43) (68 - 92)  RR: 10 (12 Dec 2023 07:43) (10 - 20)  SpO2: 98% (12 Dec 2023 07:43) (96% - 99%)    Parameters below as of 12 Dec 2023 07:43  Patient On (Oxygen Delivery Method): room air        PHYSICAL EXAM:    GENERAL: NAD  CHEST/LUNG: Clear to auscultation bilaterally; respirations unlabored on RA   HEART: S1S2+, Regular rate and rhythm  ABDOMEN: Soft, Nontender, Nondistended; Bowel sounds present  SKIN: warm, dry   NEURO: drowsy but responsive, following commands; strength 3/5 RUE, RLE; withdraws to pain LUE/LLE  PSYCH: normal affect     LABS:                        7.1    5.76  )-----------( 122      ( 12 Dec 2023 07:25 )             22.0     12-12    137  |  105  |  18.4  ----------------------------<  97  3.9   |  21.0<L>  |  0.94    Ca    8.4      12 Dec 2023 07:25  Phos  2.4     12-12  Mg     1.6     12-12        Urinalysis Basic - ( 12 Dec 2023 07:25 )    Color: x / Appearance: x / SG: x / pH: x  Gluc: 97 mg/dL / Ketone: x  / Bili: x / Urobili: x   Blood: x / Protein: x / Nitrite: x   Leuk Esterase: x / RBC: x / WBC x   Sq Epi: x / Non Sq Epi: x / Bacteria: x          MEDICATIONS  (STANDING):  atorvastatin 80 milliGRAM(s) Oral at bedtime  cefTRIAXone Injectable. 1000 milliGRAM(s) IV Push every 24 hours  diltiazem    Tablet 60 milliGRAM(s) Oral <User Schedule>  doxazosin 2 milliGRAM(s) Oral at bedtime  folic acid 1 milliGRAM(s) Oral daily  levETIRAcetam  IVPB 500 milliGRAM(s) IV Intermittent every 12 hours  melatonin 5 milliGRAM(s) Oral at bedtime  memantine 5 milliGRAM(s) Oral two times a day  metoprolol tartrate 50 milliGRAM(s) Oral <User Schedule>  modafinil 100 milliGRAM(s) Oral <User Schedule>  Nephro-roma 1 Tablet(s) Oral daily  polyethylene glycol 3350 17 Gram(s) Oral daily  senna 2 Tablet(s) Oral at bedtime  sodium chloride 0.9% lock flush 3 milliLiter(s) IV Push every 8 hours  sodium chloride 0.9%. 1000 milliLiter(s) (75 mL/Hr) IV Continuous <Continuous>    MEDICATIONS  (PRN):  acetaminophen     Tablet .. 650 milliGRAM(s) Oral every 6 hours PRN Temp greater or equal to 38C (100.4F), Mild Pain (1 - 3)  hydrALAZINE Injectable 10 milliGRAM(s) IV Push every 2 hours PRN SBP > 160 mmHg  labetalol Injectable 10 milliGRAM(s) IV Push every 2 hours PRN Systolic blood pressure > 160  ondansetron Injectable 4 milliGRAM(s) IV Push every 6 hours PRN Nausea and/or Vomiting  oxyCODONE    IR 5 milliGRAM(s) Oral every 4 hours PRN Moderate Pain (4 - 6)  oxyCODONE    IR 10 milliGRAM(s) Oral every 4 hours PRN Severe Pain (7 - 10)      RADIOLOGY & ADDITIONAL TESTS:

## 2023-12-12 NOTE — PROGRESS NOTE ADULT - SUBJECTIVE AND OBJECTIVE BOX
HPI: 82y M with PMH HTN, CAD s/p stents, renal cell carcinoma status post left nephrectomy, bladder CA, BPH, CHF, LBBB, vertigo,  HLD, 5.5cm AAA without rupture post EVAR, paroxysmal A-fib on Eliquis, Plavix, and aspirin, early stage Alzheimer dementia transferred from [facility] s/p unwitnessed fall with head strike at home found by wife on floor at 8am. Patient brought to urgent care by wife and had 5 staples to posterior scalp but was instructed to go to nearest ED.  CT head at [place] showed R frontal IPH, SDH, SAH at 9:00. CTA stroke protocol not performed at [facility]. Patient BIB on 6L NC.  Pt GCS 15 on arrival, A&Ox2 on arrival. After initial assessment, patient noted to be vomiting enroute to CT scanner.    INTERVAL HPI 12/12: 83M w/ PMH HTN, CAD s/p stents on ASA/Plavix, presented to [facility] 11/10/23 s/p unwitnessed fall with head strike at home found by wife, found with multicompartment ICH, s/p R craniectomy 11/10/23, course significant for Klebsiella pneumonia, hypernatremia, lethargy, LUE DVT, new anterior falcine SDH, downgraded to SDU 11/28.  Patient is now s/p R cranioplasty POD#4. He was seen this morning on rounds with neurosurgery team, seen lying comfortably in bed. His subgaleal drain was removed on 12/10. Tolerating diet. Passing gas/BM. Denies headache, weakness, numbness, n/v/d, chills, chest pain, SOB.     Vital Signs Last 24 Hrs  T(C): 37 (12 Dec 2023 07:43), Max: 37.1 (12 Dec 2023 04:00)  T(F): 98.6 (12 Dec 2023 07:43), Max: 98.8 (12 Dec 2023 04:00)  HR: 90 (12 Dec 2023 07:43) (72 - 90)  BP: 135/59 (12 Dec 2023 07:43) (117/51 - 135/59)  BP(mean): 80 (12 Dec 2023 07:43) (68 - 92)  RR: 10 (12 Dec 2023 07:43) (10 - 20)  SpO2: 98% (12 Dec 2023 07:43) (96% - 99%)    Parameters below as of 12 Dec 2023 07:43  Patient On (Oxygen Delivery Method): room air    PHYSICAL EXAM:  GENERAL: NAD, well-groomed  HEAD:  Atraumatic, normocephalic  WOUND: R cranioplasty Mepilex dressing clean dry intact  MENTAL STATUS: AAOx2, not oriented to date; Awake; Opens eyes spontaneously; Appropriately conversant without aphasia; following simple commands  CRANIAL NERVES: PERRL. EOMI without nystagmus. Facial sensation intact V1-3 distribution b/l. Face symmetric w/ normal eye closure and smile, tongue midline.  MOTOR: Strength 4/5 RUE/RLE, withdraws to noxious stimuli on the LUE/LLE.   SENSATION: Grossly intact to light touch all extremities  CHEST/LUNG: Non-labored breathing  ABDOMEN: Soft, nontender, nondistended  SKIN: Warm, dry    LABS:                        7.1    5.76  )-----------( 122      ( 12 Dec 2023 07:25 )             22.0     12-12    137  |  105  |  18.4  ----------------------------<  97  3.9   |  21.0<L>  |  0.94    Ca    8.4      12 Dec 2023 07:25  Phos  2.4     12-12  Mg     1.6     12-12        Urinalysis Basic - ( 12 Dec 2023 07:25 )    Color: x / Appearance: x / SG: x / pH: x  Gluc: 97 mg/dL / Ketone: x  / Bili: x / Urobili: x   Blood: x / Protein: x / Nitrite: x   Leuk Esterase: x / RBC: x / WBC x   Sq Epi: x / Non Sq Epi: x / Bacteria: x        12-11 @ 07:01  -  12-12 @ 07:00  --------------------------------------------------------  IN: 975 mL / OUT: 575 mL / NET: 400 mL    12-12 @ 07:01  -  12-12 @ 10:35  --------------------------------------------------------  IN: 150 mL / OUT: 175 mL / NET: -25 mL      RADIOLOGY & ADDITIONAL TESTS:    < from: CT Head No Cont (12.09.23 @ 18:23) >  IMPRESSION:   Unchanged fluid collection subjacent to the RIGHT   frontoparietal cranioplasty. The RIGHT subdural hematoma appears smaller   in size with minimal residual fluid and minimal pneumocephalus. Evolving   infarction again noted in the RIGHT frontal lobe.    < from: CT Head No Cont (12.11.23 @ 14:42) >  IMPRESSION: No change since 12/9/2023.     HPI: 82y M with PMH HTN, CAD s/p stents, renal cell carcinoma status post left nephrectomy, bladder CA, BPH, CHF, LBBB, vertigo,  HLD, 5.5cm AAA without rupture post EVAR, paroxysmal A-fib on Eliquis, Plavix, and aspirin, early stage Alzheimer dementia transferred from [facility] s/p unwitnessed fall with head strike at home found by wife on floor at 8am. Patient brought to urgent care by wife and had 5 staples to posterior scalp but was instructed to go to nearest ED.  CT head at [place] showed R frontal IPH, SDH, SAH at 9:00. CTA stroke protocol not performed at [facility]. Patient BIB on 6L NC.  Pt GCS 15 on arrival, A&Ox2 on arrival. After initial assessment, patient noted to be vomiting enroute to CT scanner.    INTERVAL HPI 12/12: 83M w/ PMH HTN, CAD s/p stents on ASA/Plavix, presented to [facility] 11/10/23 s/p unwitnessed fall with head strike at home found by wife, found with multicompartment ICH, s/p R craniectomy 11/10/23, course significant for Klebsiella pneumonia, hypernatremia, lethargy, LUE DVT, new anterior falcine SDH, downgraded to SDU 11/28.  Patient is now s/p R cranioplasty POD#4. He was seen this morning on rounds with neurosurgery team, seen lying comfortably in bed. His subgaleal drain was removed on 12/10. Tolerating diet. Passing gas/BM. Denies headache, weakness, numbness, n/v/d, chills, chest pain, SOB.     Vital Signs Last 24 Hrs  T(C): 37 (12 Dec 2023 07:43), Max: 37.1 (12 Dec 2023 04:00)  T(F): 98.6 (12 Dec 2023 07:43), Max: 98.8 (12 Dec 2023 04:00)  HR: 90 (12 Dec 2023 07:43) (72 - 90)  BP: 135/59 (12 Dec 2023 07:43) (117/51 - 135/59)  BP(mean): 80 (12 Dec 2023 07:43) (68 - 92)  RR: 10 (12 Dec 2023 07:43) (10 - 20)  SpO2: 98% (12 Dec 2023 07:43) (96% - 99%)    Parameters below as of 12 Dec 2023 07:43  Patient On (Oxygen Delivery Method): room air    PHYSICAL EXAM:  GENERAL: NAD, well-groomed  HEAD:  Atraumatic, normocephalic  WOUND: R cranioplasty Mepilex dressing clean dry intact  MENTAL STATUS: AAOx2, not oriented to date; Awake; Opens eyes spontaneously; Appropriately conversant without aphasia; following simple commands  CRANIAL NERVES: PERRL. EOMI without nystagmus. Facial sensation intact V1-3 distribution b/l. Face symmetric w/ normal eye closure and smile, tongue midline.  MOTOR: Strength 4/5 RUE/RLE, withdraws to noxious stimuli on the LUE/LLE.   SENSATION: Grossly intact to light touch all extremities  CHEST/LUNG: Non-labored breathing  ABDOMEN: Soft, nontender, nondistended  SKIN: Warm, dry    LABS:                        7.1    5.76  )-----------( 122      ( 12 Dec 2023 07:25 )             22.0     12-12    137  |  105  |  18.4  ----------------------------<  97  3.9   |  21.0<L>  |  0.94    Ca    8.4      12 Dec 2023 07:25  Phos  2.4     12-12  Mg     1.6     12-12        Urinalysis Basic - ( 12 Dec 2023 07:25 )    Color: x / Appearance: x / SG: x / pH: x  Gluc: 97 mg/dL / Ketone: x  / Bili: x / Urobili: x   Blood: x / Protein: x / Nitrite: x   Leuk Esterase: x / RBC: x / WBC x   Sq Epi: x / Non Sq Epi: x / Bacteria: x        12-11 @ 07:01  -  12-12 @ 07:00  --------------------------------------------------------  IN: 975 mL / OUT: 575 mL / NET: 400 mL    12-12 @ 07:01  -  12-12 @ 10:35  --------------------------------------------------------  IN: 150 mL / OUT: 175 mL / NET: -25 mL      RADIOLOGY & ADDITIONAL TESTS:    < from: CT Head No Cont (12.09.23 @ 18:23) >  IMPRESSION:   Unchanged fluid collection subjacent to the RIGHT   frontoparietal cranioplasty. The RIGHT subdural hematoma appears smaller   in size with minimal residual fluid and minimal pneumocephalus. Evolving   infarction again noted in the RIGHT frontal lobe.    < from: CT Head No Cont (12.11.23 @ 14:42) >  IMPRESSION: No change since 12/9/2023.

## 2023-12-12 NOTE — CHART NOTE - NSCHARTNOTEFT_GEN_A_CORE
Source: Patient [ ]  Family [ ]   other [x ]    Current Diet: Diet, Pureed:   Moderately Thick Liquids (MODTHICKLIQS) (12-11-23 @ 17:07)      Patient reports [ ] nausea  [ ] vomiting [ ] diarrhea [ ] constipation  [ ]chewing problems [ ] swallowing issues  [ ] other:     PO intake:  < 50% [ ]   50-75%  [ ]   %  [ x]  other :    Source for PO intake [ ] Patient [ ] family [ ] chart [ ] staff [ x] other: visual observation    Current Weight:   (12/7) 123 lbs  (11/28) 143.5 lbs  (11/27) 141 lbs  (11/23) 145 lbs  (11/22) 134.9 lbs  (11/21) 149.9 lbs  (11/20) 137.3 lbs  (11/18) 140.2 lbs  (11/16) 156.9 lbs  (11/14) 166.6 lbs  (11/13) 158.2 lbs  (11/12) 165.5 lbs  ? accuracy of weights, no edema documented, continue to trend    Pertinent Medications: MEDICATIONS  (STANDING):  atorvastatin 80 milliGRAM(s) Oral at bedtime  folic acid 1 milliGRAM(s) Oral daily  magnesium sulfate  IVPB 2 Gram(s) IV Intermittent once  memantine 5 milliGRAM(s) Oral two times a day  metoprolol tartrate 50 milliGRAM(s) Oral <User Schedule>  Nephro-roma 1 Tablet(s) Oral daily  polyethylene glycol 3350 17 Gram(s) Oral daily  senna 2 Tablet(s) Oral at bedtime  sodium chloride 0.9%. 1000 milliLiter(s) (75 mL/Hr) IV Continuous <Continuous>    MEDICATIONS  (PRN):  ondansetron Injectable 4 milliGRAM(s) IV Push every 6 hours PRN Nausea and/or Vomiting    Pertinent Labs: CBC Full  -  ( 12 Dec 2023 07:25 )  WBC Count : 5.76 K/uL  RBC Count : 2.21 M/uL  Hemoglobin : 7.1 g/dL  Hematocrit : 22.0 %    12-12 Na137 mmol/L Glu 97 mg/dL K+ 3.9 mmol/L Cr  0.94 mg/dL BUN 18.4 mg/dL Phos 2.4 mg/dL Alb n/a   PAB n/a       Skin: Surgical incision - Right cranioplasty, moisture associated dermatitis, IAD per documentation  Rivas: 14    Nutrition focused physical exam previously conducted - found signs of malnutrition [ ]absent [x ]present    Subcutaneous fat loss: Mod [x ] Orbital fat pads region, [x ]Buccal fat region, [x ]Triceps region,  [x ]Ribs region    Muscle wasting: Mod [ x]Temples region, [ x]Clavicle region, [x ]Shoulder region, [ ]Scapula region, [ ]Interosseous region,  [ ]thigh region, [ ]Calf region    Estimated Needs:   [x ] no change since previous assessment  [ ] recalculated:     Current Nutrition Diagnosis:  Pt remains at high nutrition risk secondary to severe (acute) protein calorie malnutrition related to inability to meet sufficient protein energy needs in setting of TBI, - R frontal IPH, SDH, tSAH; s/p R decompressive hemicraniectomy 11/10 as evidenced by physical signs of mod muscle/fat loss and meeting < 50% estimated energy needs > 5days, weight loss. Seen by SLP 12/11 with recommendations for a pureed diet with moderately thick liquids, +1:1 assistance and strict aspiration precautions. Pt AxOx1. Breakfast tray observed at bedside, 100% consumed per plate waste. Last BM 12/10. RD to follow up.     Recommendations:   1) Continue diet as tolerated/per SLP recommendations.  2) Will continue Magic Cup TID (290 kcal, 9g protein per serving) to optimize po intake.  3) Continue Nephro-Roma and folic acid supplementation.  4) Encourage po intake, monitor diet tolerance, and provide assistance at meals as needed.  5) Obtain daily weights to monitor trends.     Monitoring and Evaluation:   [ x] PO intake [ x] Tolerance to diet prescription [X] Weights  [X] Follow up per protocol [X] Labs: chem 8, mg, phos, H/H.

## 2023-12-13 LAB
ANION GAP SERPL CALC-SCNC: 9 MMOL/L — SIGNIFICANT CHANGE UP (ref 5–17)
ANION GAP SERPL CALC-SCNC: 9 MMOL/L — SIGNIFICANT CHANGE UP (ref 5–17)
BASOPHILS # BLD AUTO: 0.02 K/UL — SIGNIFICANT CHANGE UP (ref 0–0.2)
BASOPHILS # BLD AUTO: 0.02 K/UL — SIGNIFICANT CHANGE UP (ref 0–0.2)
BASOPHILS NFR BLD AUTO: 0.3 % — SIGNIFICANT CHANGE UP (ref 0–2)
BASOPHILS NFR BLD AUTO: 0.3 % — SIGNIFICANT CHANGE UP (ref 0–2)
BUN SERPL-MCNC: 16.8 MG/DL — SIGNIFICANT CHANGE UP (ref 8–20)
BUN SERPL-MCNC: 16.8 MG/DL — SIGNIFICANT CHANGE UP (ref 8–20)
CALCIUM SERPL-MCNC: 8.2 MG/DL — LOW (ref 8.4–10.5)
CALCIUM SERPL-MCNC: 8.2 MG/DL — LOW (ref 8.4–10.5)
CHLORIDE SERPL-SCNC: 106 MMOL/L — SIGNIFICANT CHANGE UP (ref 96–108)
CHLORIDE SERPL-SCNC: 106 MMOL/L — SIGNIFICANT CHANGE UP (ref 96–108)
CO2 SERPL-SCNC: 22 MMOL/L — SIGNIFICANT CHANGE UP (ref 22–29)
CO2 SERPL-SCNC: 22 MMOL/L — SIGNIFICANT CHANGE UP (ref 22–29)
CREAT SERPL-MCNC: 0.71 MG/DL — SIGNIFICANT CHANGE UP (ref 0.5–1.3)
CREAT SERPL-MCNC: 0.71 MG/DL — SIGNIFICANT CHANGE UP (ref 0.5–1.3)
EGFR: 92 ML/MIN/1.73M2 — SIGNIFICANT CHANGE UP
EGFR: 92 ML/MIN/1.73M2 — SIGNIFICANT CHANGE UP
EOSINOPHIL # BLD AUTO: 0.23 K/UL — SIGNIFICANT CHANGE UP (ref 0–0.5)
EOSINOPHIL # BLD AUTO: 0.23 K/UL — SIGNIFICANT CHANGE UP (ref 0–0.5)
EOSINOPHIL NFR BLD AUTO: 3.8 % — SIGNIFICANT CHANGE UP (ref 0–6)
EOSINOPHIL NFR BLD AUTO: 3.8 % — SIGNIFICANT CHANGE UP (ref 0–6)
FERRITIN SERPL-MCNC: 208 NG/ML — SIGNIFICANT CHANGE UP (ref 30–400)
FERRITIN SERPL-MCNC: 208 NG/ML — SIGNIFICANT CHANGE UP (ref 30–400)
GLUCOSE SERPL-MCNC: 99 MG/DL — SIGNIFICANT CHANGE UP (ref 70–99)
GLUCOSE SERPL-MCNC: 99 MG/DL — SIGNIFICANT CHANGE UP (ref 70–99)
HCT VFR BLD CALC: 23.9 % — LOW (ref 39–50)
HCT VFR BLD CALC: 23.9 % — LOW (ref 39–50)
HGB BLD-MCNC: 8 G/DL — LOW (ref 13–17)
HGB BLD-MCNC: 8 G/DL — LOW (ref 13–17)
IMM GRANULOCYTES NFR BLD AUTO: 0.5 % — SIGNIFICANT CHANGE UP (ref 0–0.9)
IMM GRANULOCYTES NFR BLD AUTO: 0.5 % — SIGNIFICANT CHANGE UP (ref 0–0.9)
IRON SATN MFR SERPL: 26 % — SIGNIFICANT CHANGE UP (ref 16–55)
IRON SATN MFR SERPL: 26 % — SIGNIFICANT CHANGE UP (ref 16–55)
IRON SATN MFR SERPL: 56 UG/DL — LOW (ref 59–158)
IRON SATN MFR SERPL: 56 UG/DL — LOW (ref 59–158)
LYMPHOCYTES # BLD AUTO: 1.39 K/UL — SIGNIFICANT CHANGE UP (ref 1–3.3)
LYMPHOCYTES # BLD AUTO: 1.39 K/UL — SIGNIFICANT CHANGE UP (ref 1–3.3)
LYMPHOCYTES # BLD AUTO: 23.2 % — SIGNIFICANT CHANGE UP (ref 13–44)
LYMPHOCYTES # BLD AUTO: 23.2 % — SIGNIFICANT CHANGE UP (ref 13–44)
MAGNESIUM SERPL-MCNC: 1.7 MG/DL — LOW (ref 1.8–2.6)
MAGNESIUM SERPL-MCNC: 1.7 MG/DL — LOW (ref 1.8–2.6)
MCHC RBC-ENTMCNC: 32.4 PG — SIGNIFICANT CHANGE UP (ref 27–34)
MCHC RBC-ENTMCNC: 32.4 PG — SIGNIFICANT CHANGE UP (ref 27–34)
MCHC RBC-ENTMCNC: 33.5 GM/DL — SIGNIFICANT CHANGE UP (ref 32–36)
MCHC RBC-ENTMCNC: 33.5 GM/DL — SIGNIFICANT CHANGE UP (ref 32–36)
MCV RBC AUTO: 96.8 FL — SIGNIFICANT CHANGE UP (ref 80–100)
MCV RBC AUTO: 96.8 FL — SIGNIFICANT CHANGE UP (ref 80–100)
MONOCYTES # BLD AUTO: 0.46 K/UL — SIGNIFICANT CHANGE UP (ref 0–0.9)
MONOCYTES # BLD AUTO: 0.46 K/UL — SIGNIFICANT CHANGE UP (ref 0–0.9)
MONOCYTES NFR BLD AUTO: 7.7 % — SIGNIFICANT CHANGE UP (ref 2–14)
MONOCYTES NFR BLD AUTO: 7.7 % — SIGNIFICANT CHANGE UP (ref 2–14)
NEUTROPHILS # BLD AUTO: 3.86 K/UL — SIGNIFICANT CHANGE UP (ref 1.8–7.4)
NEUTROPHILS # BLD AUTO: 3.86 K/UL — SIGNIFICANT CHANGE UP (ref 1.8–7.4)
NEUTROPHILS NFR BLD AUTO: 64.5 % — SIGNIFICANT CHANGE UP (ref 43–77)
NEUTROPHILS NFR BLD AUTO: 64.5 % — SIGNIFICANT CHANGE UP (ref 43–77)
PHOSPHATE SERPL-MCNC: 2.4 MG/DL — SIGNIFICANT CHANGE UP (ref 2.4–4.7)
PHOSPHATE SERPL-MCNC: 2.4 MG/DL — SIGNIFICANT CHANGE UP (ref 2.4–4.7)
PLATELET # BLD AUTO: 127 K/UL — LOW (ref 150–400)
PLATELET # BLD AUTO: 127 K/UL — LOW (ref 150–400)
POTASSIUM SERPL-MCNC: 3.8 MMOL/L — SIGNIFICANT CHANGE UP (ref 3.5–5.3)
POTASSIUM SERPL-MCNC: 3.8 MMOL/L — SIGNIFICANT CHANGE UP (ref 3.5–5.3)
POTASSIUM SERPL-SCNC: 3.8 MMOL/L — SIGNIFICANT CHANGE UP (ref 3.5–5.3)
POTASSIUM SERPL-SCNC: 3.8 MMOL/L — SIGNIFICANT CHANGE UP (ref 3.5–5.3)
RBC # BLD: 2.47 M/UL — LOW (ref 4.2–5.8)
RBC # BLD: 2.47 M/UL — LOW (ref 4.2–5.8)
RBC # FLD: 14.2 % — SIGNIFICANT CHANGE UP (ref 10.3–14.5)
RBC # FLD: 14.2 % — SIGNIFICANT CHANGE UP (ref 10.3–14.5)
SODIUM SERPL-SCNC: 137 MMOL/L — SIGNIFICANT CHANGE UP (ref 135–145)
SODIUM SERPL-SCNC: 137 MMOL/L — SIGNIFICANT CHANGE UP (ref 135–145)
TIBC SERPL-MCNC: 219 UG/DL — LOW (ref 220–430)
TIBC SERPL-MCNC: 219 UG/DL — LOW (ref 220–430)
TRANSFERRIN SERPL-MCNC: 153 MG/DL — LOW (ref 180–329)
TRANSFERRIN SERPL-MCNC: 153 MG/DL — LOW (ref 180–329)
WBC # BLD: 5.99 K/UL — SIGNIFICANT CHANGE UP (ref 3.8–10.5)
WBC # BLD: 5.99 K/UL — SIGNIFICANT CHANGE UP (ref 3.8–10.5)
WBC # FLD AUTO: 5.99 K/UL — SIGNIFICANT CHANGE UP (ref 3.8–10.5)
WBC # FLD AUTO: 5.99 K/UL — SIGNIFICANT CHANGE UP (ref 3.8–10.5)

## 2023-12-13 PROCEDURE — 99233 SBSQ HOSP IP/OBS HIGH 50: CPT

## 2023-12-13 PROCEDURE — 99232 SBSQ HOSP IP/OBS MODERATE 35: CPT

## 2023-12-13 RX ORDER — MAGNESIUM SULFATE 500 MG/ML
2 VIAL (ML) INJECTION ONCE
Refills: 0 | Status: COMPLETED | OUTPATIENT
Start: 2023-12-13 | End: 2023-12-13

## 2023-12-13 RX ADMIN — Medication 25 GRAM(S): at 08:36

## 2023-12-13 RX ADMIN — Medication 60 MILLIGRAM(S): at 05:02

## 2023-12-13 RX ADMIN — POLYETHYLENE GLYCOL 3350 17 GRAM(S): 17 POWDER, FOR SOLUTION ORAL at 08:36

## 2023-12-13 RX ADMIN — Medication 1 TABLET(S): at 12:40

## 2023-12-13 RX ADMIN — CEFTRIAXONE 1000 MILLIGRAM(S): 500 INJECTION, POWDER, FOR SOLUTION INTRAMUSCULAR; INTRAVENOUS at 22:41

## 2023-12-13 RX ADMIN — Medication 1 MILLIGRAM(S): at 08:36

## 2023-12-13 RX ADMIN — Medication 5 MILLIGRAM(S): at 22:41

## 2023-12-13 RX ADMIN — SODIUM CHLORIDE 3 MILLILITER(S): 9 INJECTION INTRAMUSCULAR; INTRAVENOUS; SUBCUTANEOUS at 13:45

## 2023-12-13 RX ADMIN — MEMANTINE HYDROCHLORIDE 5 MILLIGRAM(S): 10 TABLET ORAL at 05:02

## 2023-12-13 RX ADMIN — LEVETIRACETAM 400 MILLIGRAM(S): 250 TABLET, FILM COATED ORAL at 17:21

## 2023-12-13 RX ADMIN — MODAFINIL 100 MILLIGRAM(S): 200 TABLET ORAL at 08:35

## 2023-12-13 RX ADMIN — Medication 2 MILLIGRAM(S): at 22:41

## 2023-12-13 RX ADMIN — SODIUM CHLORIDE 3 MILLILITER(S): 9 INJECTION INTRAMUSCULAR; INTRAVENOUS; SUBCUTANEOUS at 22:02

## 2023-12-13 RX ADMIN — Medication 50 MILLIGRAM(S): at 23:27

## 2023-12-13 RX ADMIN — SODIUM CHLORIDE 3 MILLILITER(S): 9 INJECTION INTRAMUSCULAR; INTRAVENOUS; SUBCUTANEOUS at 05:01

## 2023-12-13 RX ADMIN — ATORVASTATIN CALCIUM 80 MILLIGRAM(S): 80 TABLET, FILM COATED ORAL at 22:41

## 2023-12-13 RX ADMIN — Medication 60 MILLIGRAM(S): at 17:21

## 2023-12-13 RX ADMIN — LEVETIRACETAM 400 MILLIGRAM(S): 250 TABLET, FILM COATED ORAL at 05:02

## 2023-12-13 RX ADMIN — MEMANTINE HYDROCHLORIDE 5 MILLIGRAM(S): 10 TABLET ORAL at 17:21

## 2023-12-13 RX ADMIN — Medication 50 MILLIGRAM(S): at 12:41

## 2023-12-13 RX ADMIN — SENNA PLUS 2 TABLET(S): 8.6 TABLET ORAL at 22:41

## 2023-12-13 NOTE — PROGRESS NOTE ADULT - SUBJECTIVE AND OBJECTIVE BOX
DEUCE BALL    [id number]    83y      Male    CC: s/p fall     INTERVAL HPI/OVERNIGHT EVENTS: Pt seen and examined. no acute events reported o/n.     REVIEW OF SYSTEMS:    CONSTITUTIONAL: No weight loss  RESPIRATORY: No cough, wheezing, hemoptysis; No shortness of breath  CARDIOVASCULAR: No chest pain, palpitations  GASTROINTESTINAL: No abdominal or epigastric pain. No nausea, vomiting  NEUROLOGICAL: No headaches    Vital Signs Last 24 Hrs  T(C): 36.5 (13 Dec 2023 12:00), Max: 37.4 (12 Dec 2023 15:20)  T(F): 97.7 (13 Dec 2023 12:00), Max: 99.3 (12 Dec 2023 15:20)  HR: 77 (13 Dec 2023 12:00) (61 - 78)  BP: 148/77 (13 Dec 2023 12:00) (114/72 - 148/77)  BP(mean): 96 (13 Dec 2023 12:00) (72 - 99)  RR: 19 (13 Dec 2023 12:00) (15 - 22)  SpO2: 97% (13 Dec 2023 12:00) (97% - 99%)    Parameters below as of 13 Dec 2023 12:00  Patient On (Oxygen Delivery Method): room air        PHYSICAL EXAM:    GENERAL: NAD  CHEST/LUNG: Clear to auscultation bilaterally; respirations unlabored on RA   HEART: S1S2+, Regular rate and rhythm  ABDOMEN: Soft, Nontender, Nondistended; Bowel sounds present  SKIN: warm, dry   NEURO: drowsy but responsive, following commands; strength 3/5 RUE, RLE; withdraws to pain LUE/LLE  PSYCH: normal affect     LABS:                        8.0    5.99  )-----------( 127      ( 13 Dec 2023 05:40 )             23.9     12-13    137  |  106  |  16.8  ----------------------------<  99  3.8   |  22.0  |  0.71    Ca    8.2<L>      13 Dec 2023 05:40  Phos  2.4     12-13  Mg     1.7     12-13        Urinalysis Basic - ( 13 Dec 2023 05:40 )    Color: x / Appearance: x / SG: x / pH: x  Gluc: 99 mg/dL / Ketone: x  / Bili: x / Urobili: x   Blood: x / Protein: x / Nitrite: x   Leuk Esterase: x / RBC: x / WBC x   Sq Epi: x / Non Sq Epi: x / Bacteria: x          MEDICATIONS  (STANDING):  atorvastatin 80 milliGRAM(s) Oral at bedtime  cefTRIAXone Injectable. 1000 milliGRAM(s) IV Push every 24 hours  diltiazem    Tablet 60 milliGRAM(s) Oral <User Schedule>  doxazosin 2 milliGRAM(s) Oral at bedtime  folic acid 1 milliGRAM(s) Oral daily  levETIRAcetam  IVPB 500 milliGRAM(s) IV Intermittent every 12 hours  melatonin 5 milliGRAM(s) Oral at bedtime  memantine 5 milliGRAM(s) Oral two times a day  metoprolol tartrate 50 milliGRAM(s) Oral <User Schedule>  modafinil 100 milliGRAM(s) Oral <User Schedule>  Nephro-roma 1 Tablet(s) Oral daily  polyethylene glycol 3350 17 Gram(s) Oral daily  senna 2 Tablet(s) Oral at bedtime  sodium chloride 0.9% lock flush 3 milliLiter(s) IV Push every 8 hours  sodium chloride 0.9%. 1000 milliLiter(s) (75 mL/Hr) IV Continuous <Continuous>    MEDICATIONS  (PRN):  acetaminophen     Tablet .. 650 milliGRAM(s) Oral every 6 hours PRN Temp greater or equal to 38C (100.4F), Mild Pain (1 - 3)  artificial tears (preservative free) Ophthalmic Solution 1 Drop(s) Both EYES every 6 hours PRN Dry Eyes  hydrALAZINE Injectable 10 milliGRAM(s) IV Push every 2 hours PRN SBP > 160 mmHg  labetalol Injectable 10 milliGRAM(s) IV Push every 2 hours PRN Systolic blood pressure > 160  ondansetron Injectable 4 milliGRAM(s) IV Push every 6 hours PRN Nausea and/or Vomiting  oxyCODONE    IR 5 milliGRAM(s) Oral every 4 hours PRN Moderate Pain (4 - 6)  oxyCODONE    IR 10 milliGRAM(s) Oral every 4 hours PRN Severe Pain (7 - 10)      RADIOLOGY & ADDITIONAL TESTS:   DEUCE BALL    [id number]    83y      Male    CC: s/p fall     INTERVAL HPI/OVERNIGHT EVENTS: Pt seen and examined. no acute events reported o/n.     REVIEW OF SYSTEMS:    CONSTITUTIONAL: No weight loss  RESPIRATORY: No cough, wheezing, hemoptysis; No shortness of breath  CARDIOVASCULAR: No chest pain, palpitations  GASTROINTESTINAL: No abdominal or epigastric pain. No nausea, vomiting  NEUROLOGICAL: No headaches    Vital Signs Last 24 Hrs  T(C): 36.5 (13 Dec 2023 12:00), Max: 37.4 (12 Dec 2023 15:20)  T(F): 97.7 (13 Dec 2023 12:00), Max: 99.3 (12 Dec 2023 15:20)  HR: 77 (13 Dec 2023 12:00) (61 - 78)  BP: 148/77 (13 Dec 2023 12:00) (114/72 - 148/77)  BP(mean): 96 (13 Dec 2023 12:00) (72 - 99)  RR: 19 (13 Dec 2023 12:00) (15 - 22)  SpO2: 97% (13 Dec 2023 12:00) (97% - 99%)    Parameters below as of 13 Dec 2023 12:00  Patient On (Oxygen Delivery Method): room air        PHYSICAL EXAM:    GENERAL: NAD  CHEST/LUNG: Clear to auscultation bilaterally; respirations unlabored on RA   HEART: S1S2+, Regular rate and rhythm  ABDOMEN: Soft, Nontender, Nondistended; Bowel sounds present  SKIN: warm, dry   NEURO: drowsy but responsive, following commands; strength 3/5 RUE, RLE; withdraws to pain LUE/LLE  PSYCH: normal affect     LABS:                        8.0    5.99  )-----------( 127      ( 13 Dec 2023 05:40 )             23.9     12-13    137  |  106  |  16.8  ----------------------------<  99  3.8   |  22.0  |  0.71    Ca    8.2<L>      13 Dec 2023 05:40  Phos  2.4     12-13  Mg     1.7     12-13        Urinalysis Basic - ( 13 Dec 2023 05:40 )    Color: x / Appearance: x / SG: x / pH: x  Gluc: 99 mg/dL / Ketone: x  / Bili: x / Urobili: x   Blood: x / Protein: x / Nitrite: x   Leuk Esterase: x / RBC: x / WBC x   Sq Epi: x / Non Sq Epi: x / Bacteria: x          MEDICATIONS  (STANDING):  atorvastatin 80 milliGRAM(s) Oral at bedtime  cefTRIAXone Injectable. 1000 milliGRAM(s) IV Push every 24 hours  diltiazem    Tablet 60 milliGRAM(s) Oral <User Schedule>  doxazosin 2 milliGRAM(s) Oral at bedtime  folic acid 1 milliGRAM(s) Oral daily  levETIRAcetam  IVPB 500 milliGRAM(s) IV Intermittent every 12 hours  melatonin 5 milliGRAM(s) Oral at bedtime  memantine 5 milliGRAM(s) Oral two times a day  metoprolol tartrate 50 milliGRAM(s) Oral <User Schedule>  modafinil 100 milliGRAM(s) Oral <User Schedule>  Nephro-roma 1 Tablet(s) Oral daily  polyethylene glycol 3350 17 Gram(s) Oral daily  senna 2 Tablet(s) Oral at bedtime  sodium chloride 0.9% lock flush 3 milliLiter(s) IV Push every 8 hours  sodium chloride 0.9%. 1000 milliLiter(s) (75 mL/Hr) IV Continuous <Continuous>    MEDICATIONS  (PRN):  acetaminophen     Tablet .. 650 milliGRAM(s) Oral every 6 hours PRN Temp greater or equal to 38C (100.4F), Mild Pain (1 - 3)  artificial tears (preservative free) Ophthalmic Solution 1 Drop(s) Both EYES every 6 hours PRN Dry Eyes  hydrALAZINE Injectable 10 milliGRAM(s) IV Push every 2 hours PRN SBP > 160 mmHg  labetalol Injectable 10 milliGRAM(s) IV Push every 2 hours PRN Systolic blood pressure > 160  ondansetron Injectable 4 milliGRAM(s) IV Push every 6 hours PRN Nausea and/or Vomiting  oxyCODONE    IR 5 milliGRAM(s) Oral every 4 hours PRN Moderate Pain (4 - 6)  oxyCODONE    IR 10 milliGRAM(s) Oral every 4 hours PRN Severe Pain (7 - 10)      RADIOLOGY & ADDITIONAL TESTS:

## 2023-12-13 NOTE — PROGRESS NOTE ADULT - ASSESSMENT
83M w/ PMH HTN, CAD s/p stents on ASA/Plavix, afib on Eliquis s/p unwitnessed fall +HS at home found by wife, found with multicompartment ICH, s/p R craniectomy 11/10/23. Now s/p cranioplasty on 12/07, POD#6    Plan:    - Q2h neuro checks  - Subgaleal drain removed 12/10  - Pain control Tylenol/Oxy PRN  - Continue Keppra 500mg q12   - Normotension  - Advance diet as tolerated- soft bite sized w/ mod thick liquids   - Record BMs; Miralax, Senna  - LE doppler negative for DVT 12/12  - DVT ppx with SCDs  - May restart ASA and Eliquis per cardio recs, 1 week post op (tomorrow, 12/14)  - PT/OT  - Dispo planning to [facility]  - Wound: May remove dressing on POD#3, 12/10. May shower on POD#5, using gentle shampoo, pat dry, leave open to air.  -  D/w Dr. Larios 83M w/ PMH HTN, CAD s/p stents on ASA/Plavix, afib on Eliquis s/p unwitnessed fall +HS at home found by wife, found with multicompartment ICH, s/p R craniectomy 11/10/23. Now s/p cranioplasty on 12/07, POD#6    Plan:    - Q2h neuro checks  - Subgaleal drain removed 12/10  - Pain control Tylenol/Oxy PRN  - Continue Keppra 500mg q12   - Normotension  - Advance diet as tolerated- soft bite sized w/ mod thick liquids   - Record BMs; Miralax, Senna  - LE doppler negative for DVT 12/12  - DVT ppx with SCDs  - May restart ASA and Eliquis per cardio recs, 1 week post op (tomorrow, 12/14)  - PT/OT  - Dispo planning to [facility]  - Wound: May remove dressing on POD#3, 12/10. May shower on POD#5, using gentle shampoo, pat dry, leave open to air.  -  D/w Dr. Larios

## 2023-12-13 NOTE — CHART NOTE - NSCHARTNOTEFT_GEN_A_CORE
Called by RN for restraint renewal. Pt has Right wrist restraint due to pulling at lines and several attempts made to touch cranioplasty post-op site. Pt requires frequent redirection. Other interventions attempted: de-escalation, orientation check, environment modification, medication assessment. I have evaluated this patient and have determined that restraints are warranted to optimize medical care. Patient was assessed for current physical and psychological risk factors as well as special needs. There are no medical conditions or limitations that would place this patient at risk while in restraints. Re-assess for discontinuing restraint within 24 hrs.

## 2023-12-13 NOTE — PROGRESS NOTE ADULT - SUBJECTIVE AND OBJECTIVE BOX
Patient awake and alert.  Reports he slept ok.  Has no pain.     FUNCTIONAL PROGRESS  12/11 SLP  Speech Language Pathology Recommendations: 1. puree diet w/ moderately thick liquids 2. 1:1 assist 3. STRICT aspiration precautions 4. Pt must be awake/alert & upright for PO5. slow rate, small bites/sips, alternate solids/liquids6. Oral care7. SLP to follow     12/8 PT  Bed Mobility Analysis:     · Bed Mobility Limitations	decreased ability to use arms for pushing/pulling; decreased ability to use legs for bridging/pushing  · Impairments Contributing to Impaired Bed Mobility	impaired balance; decreased ROM; decreased strength; impaired coordination; impaired postural control    Transfer: Sit to Stand:     · Level of [place]	maximum assist (25% patients effort)  · Physical Assist/Nonphysical Assist	2 person assist    Transfer: Stand to Sit:     · Level of [place]	maximum assist (25% patients effort)  · Physical Assist/Nonphysical Assist	2 person assist    Sit/Stand Transfer Safety Analysis:     · Impairments Contributing to Impaired Transfers	impaired balance; impaired coordination; impaired postural control; decreased ROM; decreased strength    Gait Skills:     · Level of [place]	maximum assist (25% patients effort)  · Gait Distance	bed to chair    12/8 OT  Bathing Training:     · Level of [place]	dependent (less than 25% patients effort)    Upper Body Dressing Training:     · Level of [place]	dependent (less than 25% patients effort); to don gown    Lower Body Dressing Training:     · Level of [place]	dependent (less than 25% patients effort); to don socks    Toilet Hygiene Training:     · Level of [place]	dependent (less than 25% patients effort); secondary to hall    Grooming Training:     · Level of [place]	maximum assist (25% patients effort)  · Physical Assist/Nonphysical Assist	1 person assist    Eating/Self-Feeding Training:     · Level of [place]	minimum assist (75% patients effort)  · Physical Assist/Nonphysical Assist	1 person assist      VITALS  T(C): 36.5 (12-13-23 @ 08:00), Max: 37.4 (12-12-23 @ 15:20)  HR: 74 (12-13-23 @ 08:00) (61 - 88)  BP: 134/47 (12-13-23 @ 08:00) (114/72 - 147/75)  RR: 15 (12-13-23 @ 08:00) (15 - 22)  SpO2: 99% (12-13-23 @ 08:00) (97% - 99%)  Wt(kg): --    MEDICATIONS   acetaminophen     Tablet .. 650 milliGRAM(s) every 6 hours PRN  artificial tears (preservative free) Ophthalmic Solution 1 Drop(s) every 6 hours PRN  atorvastatin 80 milliGRAM(s) at bedtime  cefTRIAXone Injectable. 1000 milliGRAM(s) every 24 hours  diltiazem    Tablet 60 milliGRAM(s) <User Schedule>  doxazosin 2 milliGRAM(s) at bedtime  folic acid 1 milliGRAM(s) daily  hydrALAZINE Injectable 10 milliGRAM(s) every 2 hours PRN  labetalol Injectable 10 milliGRAM(s) every 2 hours PRN  levETIRAcetam  IVPB 500 milliGRAM(s) every 12 hours  melatonin 5 milliGRAM(s) at bedtime  memantine 5 milliGRAM(s) two times a day  metoprolol tartrate 50 milliGRAM(s) <User Schedule>  modafinil 100 milliGRAM(s) <User Schedule>  Nephro-roma 1 Tablet(s) daily  ondansetron Injectable 4 milliGRAM(s) every 6 hours PRN  oxyCODONE    IR 5 milliGRAM(s) every 4 hours PRN  oxyCODONE    IR 10 milliGRAM(s) every 4 hours PRN  polyethylene glycol 3350 17 Gram(s) daily  senna 2 Tablet(s) at bedtime  sodium chloride 0.9% lock flush 3 milliLiter(s) every 8 hours  sodium chloride 0.9%. 1000 milliLiter(s) <Continuous>      RECENT LABS/IMAGING  - Reviewed Today                        8.0    5.99  )-----------( 127      ( 13 Dec 2023 05:40 )             23.9     12-13    137  |  106  |  16.8  ----------------------------<  99  3.8   |  22.0  |  0.71    Ca    8.2<L>      13 Dec 2023 05:40  Phos  2.4     12-13  Mg     1.7     12-13        Urinalysis Basic - ( 13 Dec 2023 05:40 )    Color: x / Appearance: x / SG: x / pH: x  Gluc: 99 mg/dL / Ketone: x  / Bili: x / Urobili: x   Blood: x / Protein: x / Nitrite: x   Leuk Esterase: x / RBC: x / WBC x   Sq Epi: x / Non Sq Epi: x / Bacteria: x                  MR Brain 11/13 - RIGHT frontoparietal craniectomy with underlying intracranial hemorrhage and edema within the RIGHT frontal lobe. Overlying small RIGHT subdural hemorrhage is also unchanged. Scant hemorrhage in the dependent portions of the BILATERAL lateral ventricles and minimal subarachnoid hemorrhage seen in the BILATERAL frontal and parietal lobes. Moderate periventricular and deep white matter ischemia is noted. Encephalomalacia and gliosis seen in the anterior frontal lobes bilaterally. Tiny acute lacunar infarction in the LEFT cerebellum and tiny acute cortical infarction in the RIGHT parietal lobe. Restricted diffusion also noted about the surgical cavity suggesting infarcted parenchyma.    CXR 11/15 - There is mild pulmonary venous congestion. Left retrocardiac/lower lobe opacity could represent associated pneumonia.    HEAD CT 11/16 - Improved to stable multicompartment intracranial hemorrhages. Right-sided subdural hygroma, larger in size.    US V DOPPLER BLE 11/16 - No evidence of deep venous thrombosis in either lower extremity.    HEAD CT 11/22 - Mixed attenuating subdural hematoma again seen with some expected postoperative changes. Evolving area of parenchymal hemorrhage involving the right frontal region.    HEAD CT 11/23 - Stable multicompartment intracranial hemorrhages. Stable right-sided subdural collection.    HEAD CT 11/29 - Stable intracranial hemorrhages.    HEAD CT 12/2  Right hemicraniectomy. Right subdural fluid attenuation collection as seen on the prior. Decreased conspicuity to regions of acute hemorrhage in the right high frontal parasagittal region as well as stable appearance to hemorrhage in the right insular posterior frontal region in association with lucency as seen on the prior study    HEAD CT 12/5 - Unchanged evolving hemorrhage within the RIGHT frontal lobe, medially and laterally with associated edema. RIGHT frontoparietal hemicraniectomy is again noted with unchanged extra-axial. Mild to moderate periventricular white matter ischemia noted.    HEAD CT 12/11 - No change since 12/9/2023.    BLE V DOPPLER 12/12 - No evidence of deep venous thrombosis in either lower extremity.  ----------------------------------------------------------------------------------------  PHYSICAL EXAM  Constitutional - NAD, Comfortable   HEENT - Right craniotomy - +Dressing  Extremities - No peripheral edema   Neurologic Exam -                    Cognitive - Awake/Alert     Communication - Hypophonic, Delayed processing     Motor - Left hemiplegia  Psych - Somnolent  ----------------------------------------------------------------------------------------  ASSESSMENT/PLAN  83yMale with functional deficits after a fall sustaining multicompartment intracranial bleeding  Wakefulness - Provigil 100mg Q8AM (12/2)  Seizure PPX - Keppra  Alzheimer's Dementia - Namenda 5mg BID, Seroquel PRN   Pyuria - Rocephin   CAD, PAFIB - Cardizem, Lopressor, Lipitor, Hydralazine, Labetalol  HTN - Hydralazine, labetalol   Dysphagia - Pureed/mod thick   Sleep - Melatonin    Oropharyngeal Dysphagia - Puree & MOD THICK Liquids  Pain - Tylenol, Oxycodone  DVT PPX - SCD   Rehab/Impaired mobility and function - Patient continues to require hospitalization for the above diagnoses and ongoing active management of comorbid complications (IV HTN medications) that are substantially impairing functional ability and impairing quality of life necessitating ongoing medical management of these complications.     Continue to recommend HOLLY, patient DOES NOT meet acute inpatient rehabilitation criteria. Patient needs a more prolonged stay to achieve transition to community living and would not be able to tolerate a comprehensive/intense rehab program of 3hours/day.     Will continue to follow. Rehab recommendations are dependent on how functional progress changes as well as how patient continues to participate and tolerate therapeutic interventions, which may change. Recommend ongoing mobilization by staff to maintain cardiopulmonary function and prevention of secondary complications related to debility. Discussed the specific management and recommendations above with rehab clinical care team/rehab liaison.         Patient awake and alert.  Reports he slept ok.  Has no pain.     FUNCTIONAL PROGRESS  12/11 SLP  Speech Language Pathology Recommendations: 1. puree diet w/ moderately thick liquids 2. 1:1 assist 3. STRICT aspiration precautions 4. Pt must be awake/alert & upright for PO5. slow rate, small bites/sips, alternate solids/liquids6. Oral care7. SLP to follow     12/8 PT  Bed Mobility Analysis:     · Bed Mobility Limitations	decreased ability to use arms for pushing/pulling; decreased ability to use legs for bridging/pushing  · Impairments Contributing to Impaired Bed Mobility	impaired balance; decreased ROM; decreased strength; impaired coordination; impaired postural control    Transfer: Sit to Stand:     · Level of [place]	maximum assist (25% patients effort)  · Physical Assist/Nonphysical Assist	2 person assist    Transfer: Stand to Sit:     · Level of [place]	maximum assist (25% patients effort)  · Physical Assist/Nonphysical Assist	2 person assist    Sit/Stand Transfer Safety Analysis:     · Impairments Contributing to Impaired Transfers	impaired balance; impaired coordination; impaired postural control; decreased ROM; decreased strength    Gait Skills:     · Level of [place]	maximum assist (25% patients effort)  · Gait Distance	bed to chair    12/8 OT  Bathing Training:     · Level of [place]	dependent (less than 25% patients effort)    Upper Body Dressing Training:     · Level of [place]	dependent (less than 25% patients effort); to don gown    Lower Body Dressing Training:     · Level of [place]	dependent (less than 25% patients effort); to don socks    Toilet Hygiene Training:     · Level of [place]	dependent (less than 25% patients effort); secondary to hall    Grooming Training:     · Level of [place]	maximum assist (25% patients effort)  · Physical Assist/Nonphysical Assist	1 person assist    Eating/Self-Feeding Training:     · Level of [place]	minimum assist (75% patients effort)  · Physical Assist/Nonphysical Assist	1 person assist      VITALS  T(C): 36.5 (12-13-23 @ 08:00), Max: 37.4 (12-12-23 @ 15:20)  HR: 74 (12-13-23 @ 08:00) (61 - 88)  BP: 134/47 (12-13-23 @ 08:00) (114/72 - 147/75)  RR: 15 (12-13-23 @ 08:00) (15 - 22)  SpO2: 99% (12-13-23 @ 08:00) (97% - 99%)  Wt(kg): --    MEDICATIONS   acetaminophen     Tablet .. 650 milliGRAM(s) every 6 hours PRN  artificial tears (preservative free) Ophthalmic Solution 1 Drop(s) every 6 hours PRN  atorvastatin 80 milliGRAM(s) at bedtime  cefTRIAXone Injectable. 1000 milliGRAM(s) every 24 hours  diltiazem    Tablet 60 milliGRAM(s) <User Schedule>  doxazosin 2 milliGRAM(s) at bedtime  folic acid 1 milliGRAM(s) daily  hydrALAZINE Injectable 10 milliGRAM(s) every 2 hours PRN  labetalol Injectable 10 milliGRAM(s) every 2 hours PRN  levETIRAcetam  IVPB 500 milliGRAM(s) every 12 hours  melatonin 5 milliGRAM(s) at bedtime  memantine 5 milliGRAM(s) two times a day  metoprolol tartrate 50 milliGRAM(s) <User Schedule>  modafinil 100 milliGRAM(s) <User Schedule>  Nephro-roma 1 Tablet(s) daily  ondansetron Injectable 4 milliGRAM(s) every 6 hours PRN  oxyCODONE    IR 5 milliGRAM(s) every 4 hours PRN  oxyCODONE    IR 10 milliGRAM(s) every 4 hours PRN  polyethylene glycol 3350 17 Gram(s) daily  senna 2 Tablet(s) at bedtime  sodium chloride 0.9% lock flush 3 milliLiter(s) every 8 hours  sodium chloride 0.9%. 1000 milliLiter(s) <Continuous>      RECENT LABS/IMAGING  - Reviewed Today                        8.0    5.99  )-----------( 127      ( 13 Dec 2023 05:40 )             23.9     12-13    137  |  106  |  16.8  ----------------------------<  99  3.8   |  22.0  |  0.71    Ca    8.2<L>      13 Dec 2023 05:40  Phos  2.4     12-13  Mg     1.7     12-13        Urinalysis Basic - ( 13 Dec 2023 05:40 )    Color: x / Appearance: x / SG: x / pH: x  Gluc: 99 mg/dL / Ketone: x  / Bili: x / Urobili: x   Blood: x / Protein: x / Nitrite: x   Leuk Esterase: x / RBC: x / WBC x   Sq Epi: x / Non Sq Epi: x / Bacteria: x                  MR Brain 11/13 - RIGHT frontoparietal craniectomy with underlying intracranial hemorrhage and edema within the RIGHT frontal lobe. Overlying small RIGHT subdural hemorrhage is also unchanged. Scant hemorrhage in the dependent portions of the BILATERAL lateral ventricles and minimal subarachnoid hemorrhage seen in the BILATERAL frontal and parietal lobes. Moderate periventricular and deep white matter ischemia is noted. Encephalomalacia and gliosis seen in the anterior frontal lobes bilaterally. Tiny acute lacunar infarction in the LEFT cerebellum and tiny acute cortical infarction in the RIGHT parietal lobe. Restricted diffusion also noted about the surgical cavity suggesting infarcted parenchyma.    CXR 11/15 - There is mild pulmonary venous congestion. Left retrocardiac/lower lobe opacity could represent associated pneumonia.    HEAD CT 11/16 - Improved to stable multicompartment intracranial hemorrhages. Right-sided subdural hygroma, larger in size.    US V DOPPLER BLE 11/16 - No evidence of deep venous thrombosis in either lower extremity.    HEAD CT 11/22 - Mixed attenuating subdural hematoma again seen with some expected postoperative changes. Evolving area of parenchymal hemorrhage involving the right frontal region.    HEAD CT 11/23 - Stable multicompartment intracranial hemorrhages. Stable right-sided subdural collection.    HEAD CT 11/29 - Stable intracranial hemorrhages.    HEAD CT 12/2  Right hemicraniectomy. Right subdural fluid attenuation collection as seen on the prior. Decreased conspicuity to regions of acute hemorrhage in the right high frontal parasagittal region as well as stable appearance to hemorrhage in the right insular posterior frontal region in association with lucency as seen on the prior study    HEAD CT 12/5 - Unchanged evolving hemorrhage within the RIGHT frontal lobe, medially and laterally with associated edema. RIGHT frontoparietal hemicraniectomy is again noted with unchanged extra-axial. Mild to moderate periventricular white matter ischemia noted.    HEAD CT 12/11 - No change since 12/9/2023.    BLE V DOPPLER 12/12 - No evidence of deep venous thrombosis in either lower extremity.  ----------------------------------------------------------------------------------------  PHYSICAL EXAM  Constitutional - NAD, Comfortable   HEENT - Right craniotomy - +Dressing  Extremities - No peripheral edema   Neurologic Exam -                    Cognitive - Awake/Alert     Communication - Hypophonic, Delayed processing     Motor - Left hemiplegia  Psych - Somnolent  ----------------------------------------------------------------------------------------  ASSESSMENT/PLAN  83yMale with functional deficits after a fall sustaining multicompartment intracranial bleeding  Wakefulness - Provigil 100mg Q8AM (12/2)  Seizure PPX - Keppra  Alzheimer's Dementia - Namenda 5mg BID, Seroquel PRN   Pyuria - Rocephin   CAD, PAFIB - Cardizem, Lopressor, Lipitor, Hydralazine, Labetalol  HTN - Hydralazine, labetalol   Dysphagia - Pureed/mod thick   Sleep - Melatonin    Oropharyngeal Dysphagia - Puree & MOD THICK Liquids  Pain - Tylenol, Oxycodone  DVT PPX - SCD   Rehab/Impaired mobility and function - Patient continues to require hospitalization for the above diagnoses and ongoing active management of comorbid complications (IV HTN medications) that are substantially impairing functional ability and impairing quality of life necessitating ongoing medical management of these complications.     Continue to recommend HOLLY, patient DOES NOT meet acute inpatient rehabilitation criteria. Patient needs a more prolonged stay to achieve transition to community living and would not be able to tolerate a comprehensive/intense rehab program of 3hours/day.     Will continue to follow. Rehab recommendations are dependent on how functional progress changes as well as how patient continues to participate and tolerate therapeutic interventions, which may change. Recommend ongoing mobilization by staff to maintain cardiopulmonary function and prevention of secondary complications related to debility. Discussed the specific management and recommendations above with rehab clinical care team/rehab liaison.

## 2023-12-13 NOTE — PROGRESS NOTE ADULT - SUBJECTIVE AND OBJECTIVE BOX
Writer attempted to see patient to assess cognitive status. Patient was unable to be awakened, despite multiple attempts by examiner. Writer will return for follow-up at later point in patient's hospitalization.

## 2023-12-13 NOTE — PROGRESS NOTE ADULT - SUBJECTIVE AND OBJECTIVE BOX
HPI: 82y M with PMH HTN, CAD s/p stents, renal cell carcinoma status post left nephrectomy, bladder CA, BPH, CHF, LBBB, vertigo,  HLD, 5.5cm AAA without rupture post EVAR, paroxysmal A-fib on Eliquis, Plavix, and aspirin, early stage Alzheimer dementia transferred from [facility] s/p unwitnessed fall with head strike at home found by wife on floor at 8am. Patient brought to urgent care by wife and had 5 staples to posterior scalp but was instructed to go to nearest ED.  CT head at [place] showed R frontal IPH, SDH, SAH at 9:00. CTA stroke protocol not performed at [facility]. Patient BIB on 6L NC.  Pt GCS 15 on arrival, A&Ox2 on arrival. After initial assessment, patient noted to be vomiting enroute to CT scanner.    INTERVAL HPI 12/12: 83M w/ PMH HTN, CAD s/p stents on ASA/Plavix, presented to [facility] 11/10/23 s/p unwitnessed fall with head strike at home found by wife, found with multicompartment ICH, s/p R craniectomy 11/10/23, course significant for Klebsiella pneumonia, hypernatremia, lethargy, LUE DVT, new anterior falcine SDH, downgraded to SDU 11/28.  Patient is now s/p R cranioplasty POD#6. He was seen this morning on rounds with neurosurgery team, seen lying comfortably in bed. His subgaleal drain was removed on 12/10. Tolerating diet. Passing gas/BM. Denies headache, weakness, numbness, n/v/d, chills, chest pain, SOB.    Vital Signs Last 24 Hrs  T(C): 36.5 (13 Dec 2023 08:00), Max: 37.4 (12 Dec 2023 15:20)  T(F): 97.7 (13 Dec 2023 08:00), Max: 99.3 (12 Dec 2023 15:20)  HR: 74 (13 Dec 2023 08:00) (61 - 88)  BP: 134/47 (13 Dec 2023 08:00) (114/72 - 147/75)  BP(mean): 72 (13 Dec 2023 08:00) (72 - 99)  RR: 15 (13 Dec 2023 08:00) (15 - 22)  SpO2: 99% (13 Dec 2023 08:00) (97% - 99%)    Parameters below as of 13 Dec 2023 08:00  Patient On (Oxygen Delivery Method): room air    PHYSICAL EXAM:  GENERAL: NAD, well-groomed  HEAD:  Atraumatic, normocephalic  WOUND: R cranioplasty Mepilex dressing clean dry intact  MENTAL STATUS: AAOx2, not oriented to date; Awake; Opens eyes spontaneously; Appropriately conversant without aphasia; following simple commands  CRANIAL NERVES: PERRL. EOMI without nystagmus. Facial sensation intact V1-3 distribution b/l. Face symmetric w/ normal eye closure and smile, tongue midline.  MOTOR: Strength 4/5 RUE/RLE, withdraws to noxious stimuli on the LUE/LLE.   SENSATION: Grossly intact to light touch all extremities  CHEST/LUNG: Non-labored breathing  ABDOMEN: Soft, nontender, nondistended  SKIN: Warm, dry    LABS:                        8.0    5.99  )-----------( 127      ( 13 Dec 2023 05:40 )             23.9     12-13    137  |  106  |  16.8  ----------------------------<  99  3.8   |  22.0  |  0.71    Ca    8.2<L>      13 Dec 2023 05:40  Phos  2.4     12-13  Mg     1.7     12-13    Urinalysis Basic - ( 13 Dec 2023 05:40 )    Color: x / Appearance: x / SG: x / pH: x  Gluc: 99 mg/dL / Ketone: x  / Bili: x / Urobili: x   Blood: x / Protein: x / Nitrite: x   Leuk Esterase: x / RBC: x / WBC x   Sq Epi: x / Non Sq Epi: x / Bacteria: x    12-12 @ 07:01  -  12-13 @ 07:00  --------------------------------------------------------  IN: 1555 mL / OUT: 2050 mL / NET: -495 mL      RADIOLOGY & ADDITIONAL TESTS:  < from: CT Head No Cont (12.11.23 @ 14:42) >  IMPRESSION: No change since 12/9/2023.    < from: CT Head No Cont (12.09.23 @ 18:23) >  IMPRESSION:   Unchanged fluid collection subjacent to the RIGHT   frontoparietal cranioplasty. The RIGHT subdural hematoma appears smaller   in size with minimal residual fluid and minimal pneumocephalus. Evolving   infarction again noted in the RIGHT frontal lobe.       HPI: 82y M with PMH HTN, CAD s/p stents, renal cell carcinoma status post left nephrectomy, bladder CA, BPH, CHF, LBBB, vertigo,  HLD, 5.5cm AAA without rupture post EVAR, paroxysmal A-fib on Eliquis, Plavix, and aspirin, early stage Alzheimer dementia transferred from [facility] s/p unwitnessed fall with head strike at home found by wife on floor at 8am. Patient brought to urgent care by wife and had 5 staples to posterior scalp but was instructed to go to nearest ED.  CT head at [place] showed R frontal IPH, SDH, SAH at 9:00. CTA stroke protocol not performed at [facility]. Patient BIB on 6L NC.  Pt GCS 15 on arrival, A&Ox2 on arrival. After initial assessment, patient noted to be vomiting enroute to CT scanner.    INTERVAL HPI 12/12: 83M w/ PMH HTN, CAD s/p stents on ASA/Plavix, presented to [facility] 11/10/23 s/p unwitnessed fall with head strike at home found by wife, found with multicompartment ICH, s/p R craniectomy 11/10/23, course significant for Klebsiella pneumonia, hypernatremia, lethargy, LUE DVT, new anterior falcine SDH, downgraded to SDU 11/28.  Patient is now s/p R cranioplasty POD#6. He was seen this morning on rounds with neurosurgery team, seen lying comfortably in bed. His subgaleal drain was removed on 12/10. Tolerating diet. Passing gas/BM. Denies headache, weakness, numbness, n/v/d, chills, chest pain, SOB.    Vital Signs Last 24 Hrs  T(C): 36.5 (13 Dec 2023 08:00), Max: 37.4 (12 Dec 2023 15:20)  T(F): 97.7 (13 Dec 2023 08:00), Max: 99.3 (12 Dec 2023 15:20)  HR: 74 (13 Dec 2023 08:00) (61 - 88)  BP: 134/47 (13 Dec 2023 08:00) (114/72 - 147/75)  BP(mean): 72 (13 Dec 2023 08:00) (72 - 99)  RR: 15 (13 Dec 2023 08:00) (15 - 22)  SpO2: 99% (13 Dec 2023 08:00) (97% - 99%)    Parameters below as of 13 Dec 2023 08:00  Patient On (Oxygen Delivery Method): room air    PHYSICAL EXAM:  GENERAL: NAD, well-groomed  HEAD:  Atraumatic, normocephalic  WOUND: R cranioplasty Mepilex dressing clean dry intact  MENTAL STATUS: AAOx2, not oriented to date; Awake; Opens eyes spontaneously; Appropriately conversant without aphasia; following simple commands  CRANIAL NERVES: PERRL. EOMI without nystagmus. Facial sensation intact V1-3 distribution b/l. Face symmetric w/ normal eye closure and smile, tongue midline.  MOTOR: Strength 4/5 RUE/RLE, withdraws to noxious stimuli on the LUE/LLE.   SENSATION: Grossly intact to light touch all extremities  CHEST/LUNG: Non-labored breathing  ABDOMEN: Soft, nontender, nondistended  SKIN: Warm, dry    LABS:                        8.0    5.99  )-----------( 127      ( 13 Dec 2023 05:40 )             23.9     12-13    137  |  106  |  16.8  ----------------------------<  99  3.8   |  22.0  |  0.71    Ca    8.2<L>      13 Dec 2023 05:40  Phos  2.4     12-13  Mg     1.7     12-13    Urinalysis Basic - ( 13 Dec 2023 05:40 )    Color: x / Appearance: x / SG: x / pH: x  Gluc: 99 mg/dL / Ketone: x  / Bili: x / Urobili: x   Blood: x / Protein: x / Nitrite: x   Leuk Esterase: x / RBC: x / WBC x   Sq Epi: x / Non Sq Epi: x / Bacteria: x    12-12 @ 07:01  -  12-13 @ 07:00  --------------------------------------------------------  IN: 1555 mL / OUT: 2050 mL / NET: -495 mL      RADIOLOGY & ADDITIONAL TESTS:  < from: CT Head No Cont (12.11.23 @ 14:42) >  IMPRESSION: No change since 12/9/2023.    < from: CT Head No Cont (12.09.23 @ 18:23) >  IMPRESSION:   Unchanged fluid collection subjacent to the RIGHT   frontoparietal cranioplasty. The RIGHT subdural hematoma appears smaller   in size with minimal residual fluid and minimal pneumocephalus. Evolving   infarction again noted in the RIGHT frontal lobe.

## 2023-12-13 NOTE — PROGRESS NOTE ADULT - ASSESSMENT
82 y/o male with PMH of HTN, CAD s/p PCO, RCC s/p left nephrectomy, bladder Ca, BPH, CHF, Vertigo, HLD, AAA s/p EVAR, parox Afib, h/o HIT, Alzheimer transferred s/p fall with Rt frontal IPH. Initially had hemicraniectomy course complicated by RVR, aspiration PNA, hypoxic respiratory failure, LUE DVT and questionable increase in AAA size. Started on AC with waxing/waning mental status prompting head imaging with evidence of new bleed. Cardiology following. Neurology consulted after MRI showed stroke. Echo done concerning for mobile echodensity on aortic valve and strokes in multiple arterial territories; WHIT recommended but as noted documented but family declined. Patient was downgraded to medical floor and later upgraded to neuro ICU after cranioplasty; subgaleal drain was removed 12/10. Patient found to have low grade fever, tachycardic, blood culture sent, UA noted for pyuria. Now downgraded to medicine team 12/10    Pyuria   fever   -UA reviewed, no ucx sent   -bcx ngtd   -monitor temp, wbc   -cont rocephin     IPH  -s/p decompressive R hemicraniotomy 11/10 and s/p R cranioplasty with replacement of bone flap 12/7  -repeat CTH stable   -additional repeat CTH 12/11 for change in neuro exam; stable   -s/p subgaleal drain removed 12/10   -neurosx following   -cont keppra bid     paroxysmal afib  HTN, HLD   CAD   -cont metoprolol, diltiazem BID   -cont statin   -cont prn labetalol, hydralazine   -holding eliquis, asa, plavix   -d/w vascular sx team, cardiology, neurosx   -plan to resume asa, eliquis 1 week postop (starting tomorrow 12/14)    Acute LUE DVT   -US LUE: stable partially occlusive L axillary vein thrombosis and L cephalic vein superficial thrombophlebitis; segmental occlusive thrombosis within 1 of 2 brachial veins AV fistula surrounded by thrombosis vs pseudoaneurysm   -Ac on hold as above   -avoid heparin products 2/2 hx of HIT   -BLE SCDs     Normocytic anemia , worsening   -cont to monitor   -f/u am cbc   -monitor for s/sx active bleeding     AAA s/p EVAR   -imaging reviewed by vascular sx   -no intervention planned at present   -antiplatelets not required for EVAR as per vascular     DONNIE resolved   -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

## 2023-12-14 LAB
ANION GAP SERPL CALC-SCNC: 13 MMOL/L — SIGNIFICANT CHANGE UP (ref 5–17)
ANION GAP SERPL CALC-SCNC: 13 MMOL/L — SIGNIFICANT CHANGE UP (ref 5–17)
BUN SERPL-MCNC: 14.6 MG/DL — SIGNIFICANT CHANGE UP (ref 8–20)
BUN SERPL-MCNC: 14.6 MG/DL — SIGNIFICANT CHANGE UP (ref 8–20)
CALCIUM SERPL-MCNC: 8.4 MG/DL — SIGNIFICANT CHANGE UP (ref 8.4–10.5)
CALCIUM SERPL-MCNC: 8.4 MG/DL — SIGNIFICANT CHANGE UP (ref 8.4–10.5)
CHLORIDE SERPL-SCNC: 105 MMOL/L — SIGNIFICANT CHANGE UP (ref 96–108)
CHLORIDE SERPL-SCNC: 105 MMOL/L — SIGNIFICANT CHANGE UP (ref 96–108)
CO2 SERPL-SCNC: 21 MMOL/L — LOW (ref 22–29)
CO2 SERPL-SCNC: 21 MMOL/L — LOW (ref 22–29)
CREAT SERPL-MCNC: 0.8 MG/DL — SIGNIFICANT CHANGE UP (ref 0.5–1.3)
CREAT SERPL-MCNC: 0.8 MG/DL — SIGNIFICANT CHANGE UP (ref 0.5–1.3)
EGFR: 88 ML/MIN/1.73M2 — SIGNIFICANT CHANGE UP
EGFR: 88 ML/MIN/1.73M2 — SIGNIFICANT CHANGE UP
GLUCOSE SERPL-MCNC: 110 MG/DL — HIGH (ref 70–99)
GLUCOSE SERPL-MCNC: 110 MG/DL — HIGH (ref 70–99)
HCT VFR BLD CALC: 23.5 % — LOW (ref 39–50)
HCT VFR BLD CALC: 23.5 % — LOW (ref 39–50)
HGB BLD-MCNC: 7.6 G/DL — LOW (ref 13–17)
HGB BLD-MCNC: 7.6 G/DL — LOW (ref 13–17)
MAGNESIUM SERPL-MCNC: 1.7 MG/DL — SIGNIFICANT CHANGE UP (ref 1.6–2.6)
MAGNESIUM SERPL-MCNC: 1.7 MG/DL — SIGNIFICANT CHANGE UP (ref 1.6–2.6)
MCHC RBC-ENTMCNC: 31 PG — SIGNIFICANT CHANGE UP (ref 27–34)
MCHC RBC-ENTMCNC: 31 PG — SIGNIFICANT CHANGE UP (ref 27–34)
MCHC RBC-ENTMCNC: 32.3 GM/DL — SIGNIFICANT CHANGE UP (ref 32–36)
MCHC RBC-ENTMCNC: 32.3 GM/DL — SIGNIFICANT CHANGE UP (ref 32–36)
MCV RBC AUTO: 95.9 FL — SIGNIFICANT CHANGE UP (ref 80–100)
MCV RBC AUTO: 95.9 FL — SIGNIFICANT CHANGE UP (ref 80–100)
PHOSPHATE SERPL-MCNC: 3 MG/DL — SIGNIFICANT CHANGE UP (ref 2.4–4.7)
PHOSPHATE SERPL-MCNC: 3 MG/DL — SIGNIFICANT CHANGE UP (ref 2.4–4.7)
PLATELET # BLD AUTO: 146 K/UL — LOW (ref 150–400)
PLATELET # BLD AUTO: 146 K/UL — LOW (ref 150–400)
POTASSIUM SERPL-MCNC: 4.1 MMOL/L — SIGNIFICANT CHANGE UP (ref 3.5–5.3)
POTASSIUM SERPL-MCNC: 4.1 MMOL/L — SIGNIFICANT CHANGE UP (ref 3.5–5.3)
POTASSIUM SERPL-SCNC: 4.1 MMOL/L — SIGNIFICANT CHANGE UP (ref 3.5–5.3)
POTASSIUM SERPL-SCNC: 4.1 MMOL/L — SIGNIFICANT CHANGE UP (ref 3.5–5.3)
RBC # BLD: 2.45 M/UL — LOW (ref 4.2–5.8)
RBC # BLD: 2.45 M/UL — LOW (ref 4.2–5.8)
RBC # FLD: 14.2 % — SIGNIFICANT CHANGE UP (ref 10.3–14.5)
RBC # FLD: 14.2 % — SIGNIFICANT CHANGE UP (ref 10.3–14.5)
SODIUM SERPL-SCNC: 139 MMOL/L — SIGNIFICANT CHANGE UP (ref 135–145)
SODIUM SERPL-SCNC: 139 MMOL/L — SIGNIFICANT CHANGE UP (ref 135–145)
WBC # BLD: 7.02 K/UL — SIGNIFICANT CHANGE UP (ref 3.8–10.5)
WBC # BLD: 7.02 K/UL — SIGNIFICANT CHANGE UP (ref 3.8–10.5)
WBC # FLD AUTO: 7.02 K/UL — SIGNIFICANT CHANGE UP (ref 3.8–10.5)
WBC # FLD AUTO: 7.02 K/UL — SIGNIFICANT CHANGE UP (ref 3.8–10.5)

## 2023-12-14 PROCEDURE — 70450 CT HEAD/BRAIN W/O DYE: CPT | Mod: 26

## 2023-12-14 PROCEDURE — 99232 SBSQ HOSP IP/OBS MODERATE 35: CPT

## 2023-12-14 PROCEDURE — 99233 SBSQ HOSP IP/OBS HIGH 50: CPT | Mod: GC

## 2023-12-14 RX ORDER — ASPIRIN/CALCIUM CARB/MAGNESIUM 324 MG
81 TABLET ORAL DAILY
Refills: 0 | Status: DISCONTINUED | OUTPATIENT
Start: 2023-12-14 | End: 2023-12-15

## 2023-12-14 RX ORDER — APIXABAN 2.5 MG/1
5 TABLET, FILM COATED ORAL
Refills: 0 | Status: DISCONTINUED | OUTPATIENT
Start: 2023-12-14 | End: 2023-12-15

## 2023-12-14 RX ADMIN — Medication 2 MILLIGRAM(S): at 21:39

## 2023-12-14 RX ADMIN — Medication 60 MILLIGRAM(S): at 07:28

## 2023-12-14 RX ADMIN — LEVETIRACETAM 400 MILLIGRAM(S): 250 TABLET, FILM COATED ORAL at 17:40

## 2023-12-14 RX ADMIN — SODIUM CHLORIDE 75 MILLILITER(S): 9 INJECTION INTRAMUSCULAR; INTRAVENOUS; SUBCUTANEOUS at 17:40

## 2023-12-14 RX ADMIN — SODIUM CHLORIDE 3 MILLILITER(S): 9 INJECTION INTRAMUSCULAR; INTRAVENOUS; SUBCUTANEOUS at 13:10

## 2023-12-14 RX ADMIN — MEMANTINE HYDROCHLORIDE 5 MILLIGRAM(S): 10 TABLET ORAL at 06:29

## 2023-12-14 RX ADMIN — SENNA PLUS 2 TABLET(S): 8.6 TABLET ORAL at 21:39

## 2023-12-14 RX ADMIN — ATORVASTATIN CALCIUM 80 MILLIGRAM(S): 80 TABLET, FILM COATED ORAL at 21:39

## 2023-12-14 RX ADMIN — POLYETHYLENE GLYCOL 3350 17 GRAM(S): 17 POWDER, FOR SOLUTION ORAL at 13:04

## 2023-12-14 RX ADMIN — SODIUM CHLORIDE 3 MILLILITER(S): 9 INJECTION INTRAMUSCULAR; INTRAVENOUS; SUBCUTANEOUS at 22:00

## 2023-12-14 RX ADMIN — Medication 1 MILLIGRAM(S): at 13:03

## 2023-12-14 RX ADMIN — MODAFINIL 100 MILLIGRAM(S): 200 TABLET ORAL at 13:04

## 2023-12-14 RX ADMIN — LEVETIRACETAM 400 MILLIGRAM(S): 250 TABLET, FILM COATED ORAL at 06:29

## 2023-12-14 RX ADMIN — Medication 81 MILLIGRAM(S): at 13:03

## 2023-12-14 RX ADMIN — CEFTRIAXONE 1000 MILLIGRAM(S): 500 INJECTION, POWDER, FOR SOLUTION INTRAMUSCULAR; INTRAVENOUS at 21:39

## 2023-12-14 RX ADMIN — SODIUM CHLORIDE 75 MILLILITER(S): 9 INJECTION INTRAMUSCULAR; INTRAVENOUS; SUBCUTANEOUS at 02:31

## 2023-12-14 RX ADMIN — Medication 60 MILLIGRAM(S): at 17:40

## 2023-12-14 RX ADMIN — Medication 50 MILLIGRAM(S): at 13:03

## 2023-12-14 RX ADMIN — Medication 5 MILLIGRAM(S): at 21:39

## 2023-12-14 RX ADMIN — SODIUM CHLORIDE 75 MILLILITER(S): 9 INJECTION INTRAMUSCULAR; INTRAVENOUS; SUBCUTANEOUS at 06:29

## 2023-12-14 RX ADMIN — APIXABAN 5 MILLIGRAM(S): 2.5 TABLET, FILM COATED ORAL at 17:41

## 2023-12-14 RX ADMIN — SODIUM CHLORIDE 3 MILLILITER(S): 9 INJECTION INTRAMUSCULAR; INTRAVENOUS; SUBCUTANEOUS at 05:51

## 2023-12-14 RX ADMIN — MEMANTINE HYDROCHLORIDE 5 MILLIGRAM(S): 10 TABLET ORAL at 17:40

## 2023-12-14 RX ADMIN — Medication 1 TABLET(S): at 13:03

## 2023-12-14 NOTE — PROGRESS NOTE ADULT - SUBJECTIVE AND OBJECTIVE BOX
More awake and talkative   Strength improving LUE   Answering simple questions   Intermittently following commands     FUNCTIONAL PROGRESS  12/11 SLP  Speech Language Pathology Recommendations: 1. puree diet w/ moderately thick liquids 2. 1:1 assist 3. STRICT aspiration precautions 4. Pt must be awake/alert & upright for PO5. slow rate, small bites/sips, alternate solids/liquids6. Oral care7. SLP to follow     12/8 PT  Bed Mobility Analysis:     · Bed Mobility Limitations	decreased ability to use arms for pushing/pulling; decreased ability to use legs for bridging/pushing  · Impairments Contributing to Impaired Bed Mobility	impaired balance; decreased ROM; decreased strength; impaired coordination; impaired postural control    Transfer: Sit to Stand:     · Level of [place]	maximum assist (25% patients effort)  · Physical Assist/Nonphysical Assist	2 person assist    Transfer: Stand to Sit:     · Level of [place]	maximum assist (25% patients effort)  · Physical Assist/Nonphysical Assist	2 person assist    Sit/Stand Transfer Safety Analysis:     · Impairments Contributing to Impaired Transfers	impaired balance; impaired coordination; impaired postural control; decreased ROM; decreased strength    Gait Skills:     · Level of [place]	maximum assist (25% patients effort)  · Gait Distance	bed to chair    12/8 OT  Bathing Training:     · Level of [place]	dependent (less than 25% patients effort)    Upper Body Dressing Training:     · Level of [place]	dependent (less than 25% patients effort); to don gown    Lower Body Dressing Training:     · Level of [place]	dependent (less than 25% patients effort); to don socks    Toilet Hygiene Training:     · Level of [place]	dependent (less than 25% patients effort); secondary to hall    Grooming Training:     · Level of [place]	maximum assist (25% patients effort)  · Physical Assist/Nonphysical Assist	1 person assist    Eating/Self-Feeding Training:     · Level of [place]	minimum assist (75% patients effort)  · Physical Assist/Nonphysical Assist	1 person assist          VITALS  T(C): 36.6 (12-14-23 @ 07:58), Max: 37.2 (12-14-23 @ 00:00)  HR: 90 (12-14-23 @ 17:30) (78 - 104)  BP: 150/79 (12-14-23 @ 17:30) (137/81 - 165/96)  RR: 18 (12-14-23 @ 17:30) (12 - 18)  SpO2: 98% (12-14-23 @ 17:30) (96% - 99%)  Wt(kg): --    MEDICATIONS   acetaminophen     Tablet .. 650 milliGRAM(s) every 6 hours PRN  apixaban 5 milliGRAM(s) two times a day  artificial tears (preservative free) Ophthalmic Solution 1 Drop(s) every 6 hours PRN  aspirin enteric coated 81 milliGRAM(s) daily  atorvastatin 80 milliGRAM(s) at bedtime  cefTRIAXone Injectable. 1000 milliGRAM(s) every 24 hours  diltiazem    Tablet 60 milliGRAM(s) <User Schedule>  doxazosin 2 milliGRAM(s) at bedtime  folic acid 1 milliGRAM(s) daily  hydrALAZINE Injectable 10 milliGRAM(s) every 2 hours PRN  labetalol Injectable 10 milliGRAM(s) every 2 hours PRN  levETIRAcetam  IVPB 500 milliGRAM(s) every 12 hours  melatonin 5 milliGRAM(s) at bedtime  memantine 5 milliGRAM(s) two times a day  metoprolol tartrate 50 milliGRAM(s) <User Schedule>  Nephro-roma 1 Tablet(s) daily  ondansetron Injectable 4 milliGRAM(s) every 6 hours PRN  oxyCODONE    IR 5 milliGRAM(s) every 4 hours PRN  oxyCODONE    IR 10 milliGRAM(s) every 4 hours PRN  polyethylene glycol 3350 17 Gram(s) daily  senna 2 Tablet(s) at bedtime  sodium chloride 0.9% lock flush 3 milliLiter(s) every 8 hours  sodium chloride 0.9%. 1000 milliLiter(s) <Continuous>      RECENT LABS/IMAGING  - Reviewed Today                        7.6    7.02  )-----------( 146      ( 14 Dec 2023 05:30 )             23.5     12-14    139  |  105  |  14.6  ----------------------------<  110<H>  4.1   |  21.0<L>  |  0.80    Ca    8.4      14 Dec 2023 05:30  Phos  3.0     12-14  Mg     1.7     12-14        Urinalysis Basic - ( 14 Dec 2023 05:30 )    Color: x / Appearance: x / SG: x / pH: x  Gluc: 110 mg/dL / Ketone: x  / Bili: x / Urobili: x   Blood: x / Protein: x / Nitrite: x   Leuk Esterase: x / RBC: x / WBC x   Sq Epi: x / Non Sq Epi: x / Bacteria: x      MR Brain 11/13 - RIGHT frontoparietal craniectomy with underlying intracranial hemorrhage and edema within the RIGHT frontal lobe. Overlying small RIGHT subdural hemorrhage is also unchanged. Scant hemorrhage in the dependent portions of the BILATERAL lateral ventricles and minimal subarachnoid hemorrhage seen in the BILATERAL frontal and parietal lobes. Moderate periventricular and deep white matter ischemia is noted. Encephalomalacia and gliosis seen in the anterior frontal lobes bilaterally. Tiny acute lacunar infarction in the LEFT cerebellum and tiny acute cortical infarction in the RIGHT parietal lobe. Restricted diffusion also noted about the surgical cavity suggesting infarcted parenchyma.    CXR 11/15 - There is mild pulmonary venous congestion. Left retrocardiac/lower lobe opacity could represent associated pneumonia.    HEAD CT 11/16 - Improved to stable multicompartment intracranial hemorrhages. Right-sided subdural hygroma, larger in size.    US V DOPPLER BLE 11/16 - No evidence of deep venous thrombosis in either lower extremity.    HEAD CT 11/22 - Mixed attenuating subdural hematoma again seen with some expected postoperative changes. Evolving area of parenchymal hemorrhage involving the right frontal region.    HEAD CT 11/23 - Stable multicompartment intracranial hemorrhages. Stable right-sided subdural collection.    HEAD CT 11/29 - Stable intracranial hemorrhages.    HEAD CT 12/2  Right hemicraniectomy. Right subdural fluid attenuation collection as seen on the prior. Decreased conspicuity to regions of acute hemorrhage in the right high frontal parasagittal region as well as stable appearance to hemorrhage in the right insular posterior frontal region in association with lucency as seen on the prior study    HEAD CT 12/5 - Unchanged evolving hemorrhage within the RIGHT frontal lobe, medially and laterally with associated edema. RIGHT frontoparietal hemicraniectomy is again noted with unchanged extra-axial. Mild to moderate periventricular white matter ischemia noted.    HEAD CT 12/11 - No change since 12/9/2023.    BLE V DOPPLER 12/12 - No evidence of deep venous thrombosis in either lower extremity.    HEAD CT 12/14 -  The patient is status post right frontal prosthetic cranioplasty. Adjacent to the cranioplasty is extra-axial collection of fluid, air, hemorrhage, smaller in size. Stable small right frontal convexity subdural hypodense collection. Improved small hemorrhages in the posterior right frontal lobe with adjacent hypodensity. Thin posterior falx subdural hemorrhage tracks along the left tentorial leaflet, unchanged. No new hemorrhage.  ----------------------------------------------------------------------------------------  PHYSICAL EXAM  Constitutional - NAD, Comfortable   HEENT - Right craniotomy - +Dressing  Extremities - No peripheral edema   Neurologic Exam -                    Cognitive - Awake/Alert     Communication - Hypophonic, Delayed processing     Motor - L EF 1/5, L EE 1/5   Psych - Awake   ----------------------------------------------------------------------------------------  ASSESSMENT/PLAN  83yMale with functional deficits after a fall sustaining multicompartment intracranial bleeding  Wakefulness - Discontinued Provigil 100mg Q8AM (12/14)  Seizure PPX - Keppra  Alzheimer's Dementia - Namenda 5mg BID, Seroquel PRN   Pyuria - Rocephin   CAD, PAFIB - Cardizem, Lopressor, Lipitor, Hydralazine, Labetalol  HTN - Hydralazine, labetalol   Sleep - Melatonin    Oropharyngeal Dysphagia - Puree & MOD THICK Liquids  Pain - Tylenol, Oxycodone  DVT PPX - SCD, apixaban   Rehab/Impaired mobility and function - Continuous hospitalization is crucial for managing the patient's acute medical issues (intracranial bleed, AMS, hemiplegia), which have significantly impacted their mobility, quality of life, and function. Rehabilitation recommendations will be based on the patient's functional progress and their ability to participate in and tolerate therapeutic interventions, which may change over time. Maintaining ongoing mobilization by the staff is imperative to prevent secondary medical complications and associated health issues related to debility.    Given patient's medical diagnosis, functional status, and potential for progress, he is likely a good candidate for [facility] placement. He currently DOES NOT meet the criteria for acute inpatient rehabilitation and would not tolerate 3h of intensive PT/OT/SLP daily. Discharge recommendations are subject to change and PM&R team will continue to follow to monitor progress while in the hospital.            More awake and talkative   Strength improving LUE   Answering simple questions   Intermittently following commands     FUNCTIONAL PROGRESS  12/11 SLP  Speech Language Pathology Recommendations: 1. puree diet w/ moderately thick liquids 2. 1:1 assist 3. STRICT aspiration precautions 4. Pt must be awake/alert & upright for PO5. slow rate, small bites/sips, alternate solids/liquids6. Oral care7. SLP to follow     12/8 PT  Bed Mobility Analysis:     · Bed Mobility Limitations	decreased ability to use arms for pushing/pulling; decreased ability to use legs for bridging/pushing  · Impairments Contributing to Impaired Bed Mobility	impaired balance; decreased ROM; decreased strength; impaired coordination; impaired postural control    Transfer: Sit to Stand:     · Level of [place]	maximum assist (25% patients effort)  · Physical Assist/Nonphysical Assist	2 person assist    Transfer: Stand to Sit:     · Level of [place]	maximum assist (25% patients effort)  · Physical Assist/Nonphysical Assist	2 person assist    Sit/Stand Transfer Safety Analysis:     · Impairments Contributing to Impaired Transfers	impaired balance; impaired coordination; impaired postural control; decreased ROM; decreased strength    Gait Skills:     · Level of [place]	maximum assist (25% patients effort)  · Gait Distance	bed to chair    12/8 OT  Bathing Training:     · Level of [place]	dependent (less than 25% patients effort)    Upper Body Dressing Training:     · Level of [place]	dependent (less than 25% patients effort); to don gown    Lower Body Dressing Training:     · Level of [place]	dependent (less than 25% patients effort); to don socks    Toilet Hygiene Training:     · Level of [place]	dependent (less than 25% patients effort); secondary to hall    Grooming Training:     · Level of [place]	maximum assist (25% patients effort)  · Physical Assist/Nonphysical Assist	1 person assist    Eating/Self-Feeding Training:     · Level of [place]	minimum assist (75% patients effort)  · Physical Assist/Nonphysical Assist	1 person assist          VITALS  T(C): 36.6 (12-14-23 @ 07:58), Max: 37.2 (12-14-23 @ 00:00)  HR: 90 (12-14-23 @ 17:30) (78 - 104)  BP: 150/79 (12-14-23 @ 17:30) (137/81 - 165/96)  RR: 18 (12-14-23 @ 17:30) (12 - 18)  SpO2: 98% (12-14-23 @ 17:30) (96% - 99%)  Wt(kg): --    MEDICATIONS   acetaminophen     Tablet .. 650 milliGRAM(s) every 6 hours PRN  apixaban 5 milliGRAM(s) two times a day  artificial tears (preservative free) Ophthalmic Solution 1 Drop(s) every 6 hours PRN  aspirin enteric coated 81 milliGRAM(s) daily  atorvastatin 80 milliGRAM(s) at bedtime  cefTRIAXone Injectable. 1000 milliGRAM(s) every 24 hours  diltiazem    Tablet 60 milliGRAM(s) <User Schedule>  doxazosin 2 milliGRAM(s) at bedtime  folic acid 1 milliGRAM(s) daily  hydrALAZINE Injectable 10 milliGRAM(s) every 2 hours PRN  labetalol Injectable 10 milliGRAM(s) every 2 hours PRN  levETIRAcetam  IVPB 500 milliGRAM(s) every 12 hours  melatonin 5 milliGRAM(s) at bedtime  memantine 5 milliGRAM(s) two times a day  metoprolol tartrate 50 milliGRAM(s) <User Schedule>  Nephro-roma 1 Tablet(s) daily  ondansetron Injectable 4 milliGRAM(s) every 6 hours PRN  oxyCODONE    IR 5 milliGRAM(s) every 4 hours PRN  oxyCODONE    IR 10 milliGRAM(s) every 4 hours PRN  polyethylene glycol 3350 17 Gram(s) daily  senna 2 Tablet(s) at bedtime  sodium chloride 0.9% lock flush 3 milliLiter(s) every 8 hours  sodium chloride 0.9%. 1000 milliLiter(s) <Continuous>      RECENT LABS/IMAGING  - Reviewed Today                        7.6    7.02  )-----------( 146      ( 14 Dec 2023 05:30 )             23.5     12-14    139  |  105  |  14.6  ----------------------------<  110<H>  4.1   |  21.0<L>  |  0.80    Ca    8.4      14 Dec 2023 05:30  Phos  3.0     12-14  Mg     1.7     12-14        Urinalysis Basic - ( 14 Dec 2023 05:30 )    Color: x / Appearance: x / SG: x / pH: x  Gluc: 110 mg/dL / Ketone: x  / Bili: x / Urobili: x   Blood: x / Protein: x / Nitrite: x   Leuk Esterase: x / RBC: x / WBC x   Sq Epi: x / Non Sq Epi: x / Bacteria: x      MR Brain 11/13 - RIGHT frontoparietal craniectomy with underlying intracranial hemorrhage and edema within the RIGHT frontal lobe. Overlying small RIGHT subdural hemorrhage is also unchanged. Scant hemorrhage in the dependent portions of the BILATERAL lateral ventricles and minimal subarachnoid hemorrhage seen in the BILATERAL frontal and parietal lobes. Moderate periventricular and deep white matter ischemia is noted. Encephalomalacia and gliosis seen in the anterior frontal lobes bilaterally. Tiny acute lacunar infarction in the LEFT cerebellum and tiny acute cortical infarction in the RIGHT parietal lobe. Restricted diffusion also noted about the surgical cavity suggesting infarcted parenchyma.    CXR 11/15 - There is mild pulmonary venous congestion. Left retrocardiac/lower lobe opacity could represent associated pneumonia.    HEAD CT 11/16 - Improved to stable multicompartment intracranial hemorrhages. Right-sided subdural hygroma, larger in size.    US V DOPPLER BLE 11/16 - No evidence of deep venous thrombosis in either lower extremity.    HEAD CT 11/22 - Mixed attenuating subdural hematoma again seen with some expected postoperative changes. Evolving area of parenchymal hemorrhage involving the right frontal region.    HEAD CT 11/23 - Stable multicompartment intracranial hemorrhages. Stable right-sided subdural collection.    HEAD CT 11/29 - Stable intracranial hemorrhages.    HEAD CT 12/2  Right hemicraniectomy. Right subdural fluid attenuation collection as seen on the prior. Decreased conspicuity to regions of acute hemorrhage in the right high frontal parasagittal region as well as stable appearance to hemorrhage in the right insular posterior frontal region in association with lucency as seen on the prior study    HEAD CT 12/5 - Unchanged evolving hemorrhage within the RIGHT frontal lobe, medially and laterally with associated edema. RIGHT frontoparietal hemicraniectomy is again noted with unchanged extra-axial. Mild to moderate periventricular white matter ischemia noted.    HEAD CT 12/11 - No change since 12/9/2023.    BLE V DOPPLER 12/12 - No evidence of deep venous thrombosis in either lower extremity.    HEAD CT 12/14 -  The patient is status post right frontal prosthetic cranioplasty. Adjacent to the cranioplasty is extra-axial collection of fluid, air, hemorrhage, smaller in size. Stable small right frontal convexity subdural hypodense collection. Improved small hemorrhages in the posterior right frontal lobe with adjacent hypodensity. Thin posterior falx subdural hemorrhage tracks along the left tentorial leaflet, unchanged. No new hemorrhage.  ----------------------------------------------------------------------------------------  PHYSICAL EXAM  Constitutional - NAD, Comfortable   HEENT - Right craniotomy - +Dressing  Extremities - No peripheral edema   Neurologic Exam -                    Cognitive - Awake/Alert     Communication - Hypophonic, Delayed processing     Motor - L EF 1/5, L EE 1/5   Psych - Awake   ----------------------------------------------------------------------------------------  ASSESSMENT/PLAN  83yMale with functional deficits after a fall sustaining multicompartment intracranial bleeding  Wakefulness - Discontinued Provigil 100mg Q8AM (12/14)  Seizure PPX - Keppra  Alzheimer's Dementia - Namenda 5mg BID, Seroquel PRN   Pyuria - Rocephin   CAD, PAFIB - Cardizem, Lopressor, Lipitor, Hydralazine, Labetalol  HTN - Hydralazine, labetalol   Sleep - Melatonin    Oropharyngeal Dysphagia - Puree & MOD THICK Liquids  Pain - Tylenol, Oxycodone  DVT PPX - SCD, apixaban   Rehab/Impaired mobility and function - Continuous hospitalization is crucial for managing the patient's acute medical issues (intracranial bleed, AMS, hemiplegia), which have significantly impacted their mobility, quality of life, and function. Rehabilitation recommendations will be based on the patient's functional progress and their ability to participate in and tolerate therapeutic interventions, which may change over time. Maintaining ongoing mobilization by the staff is imperative to prevent secondary medical complications and associated health issues related to debility.    Given patient's medical diagnosis, functional status, and potential for progress, he is likely a good candidate for [facility] placement. He currently DOES NOT meet the criteria for acute inpatient rehabilitation and would not tolerate 3h of intensive PT/OT/SLP daily. Discharge recommendations are subject to change and PM&R team will continue to follow to monitor progress while in the hospital.

## 2023-12-14 NOTE — PROGRESS NOTE ADULT - ASSESSMENT
84 y/o male with PMH of HTN, CAD s/p PCO, RCC s/p left nephrectomy, bladder Ca, BPH, CHF, Vertigo, HLD, AAA s/p EVAR, parox Afib, h/o HIT, Alzheimer transferred s/p fall with Rt frontal IPH. Initially had hemicraniectomy course complicated by RVR, aspiration PNA, hypoxic respiratory failure, LUE DVT and questionable increase in AAA size. Started on AC with waxing/waning mental status prompting head imaging with evidence of new bleed. Cardiology following. Neurology consulted after MRI showed stroke. Echo done concerning for mobile echodensity on aortic valve and strokes in multiple arterial territories; WHIT recommended but as noted documented but family declined. Patient was downgraded to medical floor and later upgraded to neuro ICU after cranioplasty; subgaleal drain was removed 12/10. Patient found to have low grade fever, tachycardic, blood culture sent, UA noted for pyuria. Now downgraded to medicine team 12/10    Pyuria   fever   -UA reviewed, no ucx sent   -bcx ngtd   -monitor temp, wbc   -cont rocephin     IPH  -s/p decompressive R hemicraniotomy 11/10 and s/p R cranioplasty with replacement of bone flap 12/7  -additional repeat CTH 12/11 for change in neuro exam; stable   -s/p subgaleal drain removed 12/10   -CTH 12/14 s/p R frontal prosthetic cranioplasty, adjacent to cranioplasty is extra-axial collection of fluid, air, hemorrhage, smaller in size; stable small R frontal subdural hypodense collection; improved small hemorrhages in posterior R frontal lobe; no new hemorrhage   -neurosx following   -cont keppra bid     paroxysmal afib  HTN, HLD   CAD   -cont metoprolol, diltiazem BID   -cont statin   -cont prn labetalol, hydralazine   -d/w vascular sx team, cardiology, neurosx   -plan to resume asa, eliquis 1 week postop (starting today 12/14)  -f/u am cbc     Acute LUE DVT   -US LUE: stable partially occlusive L axillary vein thrombosis and L cephalic vein superficial thrombophlebitis; segmental occlusive thrombosis within 1 of 2 brachial veins AV fistula surrounded by thrombosis vs pseudoaneurysm   -Ac resumed as above   -avoid heparin products 2/2 hx of HIT   -BLE SCDs     Normocytic anemia , worsening   -cont to monitor   -f/u am cbc   -monitor for s/sx active bleeding     AAA s/p EVAR   -imaging reviewed by vascular sx   -no intervention planned at present   -antiplatelets not required for EVAR as per vascular     DONNIE resolved   -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 Initially had hemicraniectomy course complicated by RVR, aspiration PNA, hypoxic respiratory failure, LUE DVT and questionable increase in AAA size. Started on AC with waxing/waning mental status prompting head imaging with evidence of new bleed. Cardiology following. Neurology consulted after MRI showed stroke. Echo done concerning for mobile echodensity on aortic valve and strokes in multiple arterial territories; WHIT recommended but as noted documented but family declined. Patient was downgraded to medical floor and later upgraded to neuro ICU after cranioplasty; subgaleal drain was removed 12/10. Patient found to have low grade fever, tachycardic, blood culture sent, UA noted for pyuria. Now downgraded to medicine team 12/10    Pyuria   fever   -UA reviewed, no ucx sent   -bcx ngtd   -monitor temp, wbc   -cont rocephin     IPH  -s/p decompressive R hemicraniotomy 11/10 and s/p R cranioplasty with replacement of bone flap 12/7  -additional repeat CTH 12/11 for change in neuro exam; stable   -s/p subgaleal drain removed 12/10   -CTH 12/14 s/p R frontal prosthetic cranioplasty, adjacent to cranioplasty is extra-axial collection of fluid, air, hemorrhage, smaller in size; stable small R frontal subdural hypodense collection; improved small hemorrhages in posterior R frontal lobe; no new hemorrhage   -neurosx following   -cont keppra bid     paroxysmal afib  HTN, HLD   CAD   -cont metoprolol, diltiazem BID   -cont statin   -cont prn labetalol, hydralazine   -d/w vascular sx team, cardiology, neurosx   -plan to resume asa, eliquis 1 week postop (starting today 12/14)  -f/u am cbc     Acute LUE DVT   -US LUE: stable partially occlusive L axillary vein thrombosis and L cephalic vein superficial thrombophlebitis; segmental occlusive thrombosis within 1 of 2 brachial veins AV fistula surrounded by thrombosis vs pseudoaneurysm   -Ac resumed as above   -avoid heparin products 2/2 hx of HIT   -BLE SCDs     Normocytic anemia , worsening   -cont to monitor   -f/u am cbc   -monitor for s/sx active bleeding     AAA s/p EVAR   -imaging reviewed by vascular sx   -no intervention planned at present   -antiplatelets not required for EVAR as per vascular     DONNIE resolved   -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

## 2023-12-14 NOTE — PROGRESS NOTE ADULT - SUBJECTIVE AND OBJECTIVE BOX
INTERVAL HPI/ACUTE EVENTS:  83M w/ PMH HTN, CAD s/p stents on ASA/Plavix, RCC s/p L nephrectomy, bladder CA, BPH, CHF, LBBB, vertigo, HLD, 5.5 cm AAA without rupture post EVAR, paroxysmal afib on Eliquis, early stage Alzheimer's dementia, presented to [facility] 11/10/23 s/p unwitnessed fall with head strike at home found by wife, found with multicompartmental ICH, s/p R craniectomy 11/10/23, course significant for Klebsiella pneumonia, hypernatremia, lethargy, LUE DVT, new anterior falcine SDH, downgraded to SDU 11/28  s/p R cranioplasty POD#6  Patient seen and examined this morning with neurosurgical team. Flap full, soft. CTH pending prior to Eliquis. Will need CTH 24 hours after Eliquis also. LBM: 12/10    Vital Signs Last 24 Hrs  T(C): 36.6 (14 Dec 2023 07:58), Max: 37.2 (14 Dec 2023 00:00)  T(F): 97.9 (14 Dec 2023 07:58), Max: 99 (14 Dec 2023 00:00)  HR: 78 (14 Dec 2023 08:00) (77 - 104)  BP: 139/83 (14 Dec 2023 08:00) (137/81 - 155/76)  BP(mean): 93 (14 Dec 2023 08:00) (86 - 102)  RR: 12 (14 Dec 2023 08:00) (12 - 19)  SpO2: 97% (14 Dec 2023 08:00) (97% - 99%)  Parameters below as of 14 Dec 2023 08:00  Patient On (Oxygen Delivery Method): room air    PHYSICAL EXAM:  GENERAL: NAD, well-groomed  HEAD:  S/p R crani. Island dressing clean, dry, intact.   WOUND: Dressing clean dry intact  JOEY COMA SCORE: E-4 V-4 M-6 = 14  MENTAL STATUS: AAO x1(name, minimally conversant. following simple commands on R, thumbs up, opening eyes, following most EOM.  CRANIAL NERVES:  Tracking examiner. Gaze midline. Mild R temporal edema 2/2 crani site   MOTOR: strength 4/5 RUE. RLE spontaneously bending at knee. LUE/LLE trace WD.    SENSATION: dec L sided sensation. grossly intact on R sided  SKIN: Warm, dry    LABS:             7.6    7.02  )-----------( 146      ( 14 Dec 2023 05:30 )             23.5     12-14    139  |  105  |  14.6  ----------------------------<  110<H>  4.1   |  21.0<L>  |  0.80    Ca    8.4      14 Dec 2023 05:30  Phos  3.0     12-14  Mg     1.7     12-14    Urinalysis Basic - ( 14 Dec 2023 05:30 )    Color: x / Appearance: x / SG: x / pH: x  Gluc: 110 mg/dL / Ketone: x  / Bili: x / Urobili: x   Blood: x / Protein: x / Nitrite: x   Leuk Esterase: x / RBC: x / WBC x   Sq Epi: x / Non Sq Epi: x / Bacteria: x    12-13 @ 07:01  -  12-14 @ 07:00  --------------------------------------------------------  IN: 1825 mL / OUT: 3450 mL / NET: -1625 mL        RADIOLOGY & ADDITIONAL TESTS:  CT Head No Cont (12.11.23 @ 14:42)  IMPRESSION:   No change since 12/9/2023.   INTERVAL HPI/ACUTE EVENTS:  83M w/ PMH HTN, CAD s/p stents on ASA/Plavix, RCC s/p L nephrectomy, bladder CA, BPH, CHF, LBBB, vertigo, HLD, 5.5 cm AAA without rupture post EVAR, paroxysmal afib on Eliquis, early stage Alzheimer's dementia, presented to [facility] 11/10/23 s/p unwitnessed fall with head strike at home found by wife, found with multicompartmental ICH, s/p R craniectomy 11/10/23, course significant for Klebsiella pneumonia, hypernatremia, lethargy, LUE DVT, new anterior falcine SDH, downgraded to SDU 11/28  s/p R cranioplasty POD#6  Patient seen and examined this morning with neurosurgical team. Flap full, soft. CTH pending prior to Eliquis. Will need CTH 24 hours after Eliquis also. LBM: 12/10    Vital Signs Last 24 Hrs  T(C): 36.6 (14 Dec 2023 07:58), Max: 37.2 (14 Dec 2023 00:00)  T(F): 97.9 (14 Dec 2023 07:58), Max: 99 (14 Dec 2023 00:00)  HR: 78 (14 Dec 2023 08:00) (77 - 104)  BP: 139/83 (14 Dec 2023 08:00) (137/81 - 155/76)  BP(mean): 93 (14 Dec 2023 08:00) (86 - 102)  RR: 12 (14 Dec 2023 08:00) (12 - 19)  SpO2: 97% (14 Dec 2023 08:00) (97% - 99%)  Parameters below as of 14 Dec 2023 08:00  Patient On (Oxygen Delivery Method): room air    PHYSICAL EXAM:  GENERAL: NAD, well-groomed  HEAD:  S/p R crani. Island dressing clean, dry, intact.   WOUND: Dressing clean dry intact  JOEY COMA SCORE: E-4 V-4 M-6 = 14  MENTAL STATUS: AAO x1(name, minimally conversant. following simple commands on R, thumbs up, opening eyes, following most EOM.  CRANIAL NERVES:  Tracking examiner. Gaze midline. Mild R temporal edema 2/2 crani site   MOTOR: strength 4/5 RUE. RLE spontaneously bending at knee. LUE/LLE trace WD.    SENSATION: dec L sided sensation. grossly intact on R sided  SKIN: Warm, dry    LABS:             7.6    7.02  )-----------( 146      ( 14 Dec 2023 05:30 )             23.5     12-14    139  |  105  |  14.6  ----------------------------<  110<H>  4.1   |  21.0<L>  |  0.80    Ca    8.4      14 Dec 2023 05:30  Phos  3.0     12-14  Mg     1.7     12-14    Urinalysis Basic - ( 14 Dec 2023 05:30 )    Color: x / Appearance: x / SG: x / pH: x  Gluc: 110 mg/dL / Ketone: x  / Bili: x / Urobili: x   Blood: x / Protein: x / Nitrite: x   Leuk Esterase: x / RBC: x / WBC x   Sq Epi: x / Non Sq Epi: x / Bacteria: x    12-13 @ 07:01  -  12-14 @ 07:00  --------------------------------------------------------  IN: 1825 mL / OUT: 3450 mL / NET: -1625 mL        RADIOLOGY & ADDITIONAL TESTS:  CT Head No Cont (12.11.23 @ 14:42)  IMPRESSION:   No change since 12/9/2023.

## 2023-12-14 NOTE — PROGRESS NOTE ADULT - ASSESSMENT
83M w/ PMH HTN, CAD s/p stents on ASA/Plavix, RCC s/p L nephrectomy, bladder CA, BPH, CHF, LBBB, vertigo, HLD, 5.5 cm AAA without rupture post EVAR, paroxysmal afib on Eliquis, early stage Alzheimer's dementia, presented to [facility] 11/10/23 s/p unwitnessed fall with head strike at home found by wife, found with multicompartmental ICH, s/p R craniectomy 11/10/23, course significant for Klebsiella pneumonia, hypernatremia, lethargy, LUE DVT, new anterior falcine SDH.  s/p R cranioplasty POD#6      Plan  - CTH this AM prior to beginning Eliquis  - Will need follow up CTH in 24 hours   - Continue Q2 neuro checks  - Pain control PRN; avoid oversedation. Tylenol Oxy 5/10   - Keppra 500 BID  - HOB 30 degrees   - Normotension  - Resume PO meds; Amantadine 150 QAM, Memantine 5 BID, Provigil 100, Lipitor 80, FA, Melatonin, Nephro-roma  - Record BMs; Miralax, Senna LBM 12/10  - PT/OT  - Wound: Permitted to daily showers using gentle soap near incision, pat dry, leave open to air.   - Medical management/supportive care per primary team  - D/w Dr. Larios   83M w/ PMH HTN, CAD s/p stents on ASA/Plavix, RCC s/p L nephrectomy, bladder CA, BPH, CHF, LBBB, vertigo, HLD, 5.5 cm AAA without rupture post EVAR, paroxysmal afib on Eliquis, early stage Alzheimer's dementia, presented to [facility] 11/10/23 s/p unwitnessed fall with head strike at home found by wife, found with multicompartmental ICH, s/p R craniectomy 11/10/23, course significant for Klebsiella pneumonia, hypernatremia, lethargy, LUE DVT, new anterior falcine SDH.  s/p R cranioplasty POD#6      Plan  - CTH this AM prior to beginning Eliquis  - Will need follow up CTH in 24 hours   - Continue Q2 neuro checks  - Pain control PRN; avoid oversedation. Tylenol Oxy 5/10   - Keppra 500 BID  - HOB 30 degrees   - Normotension  - Resume PO meds; Amantadine 150 QAM, Memantine 5 BID, Provigil 100, Lipitor 80, FA, Melatonin, Nephro-roma  - Record BMs; Miralax, Senna LBM 12/10  - PT/OT  - Wound: Permitted to daily showers using gentle soap near incision, pat dry, leave open to air.   - Medical management/supportive care per primary team  - D/w Dr. Larios

## 2023-12-14 NOTE — PROGRESS NOTE ADULT - SUBJECTIVE AND OBJECTIVE BOX
DEUCE BALL    [id number]    83y      Male    CC: s/p fall     INTERVAL HPI/OVERNIGHT EVENTS: Pt seen and examined. no acute events reported o/n      REVIEW OF SYSTEMS:    RESPIRATORY: No cough, wheezing, hemoptysis  CARDIOVASCULAR: No chest pain, palpitations  GASTROINTESTINAL: No abdominal or epigastric pain. No nausea, vomiting  NEUROLOGICAL: No headaches    Vital Signs Last 24 Hrs  T(C): 36.6 (14 Dec 2023 07:58), Max: 37.2 (14 Dec 2023 00:00)  T(F): 97.9 (14 Dec 2023 07:58), Max: 99 (14 Dec 2023 00:00)  HR: 89 (14 Dec 2023 12:00) (78 - 104)  BP: 156/79 (14 Dec 2023 12:00) (137/81 - 156/79)  BP(mean): 102 (14 Dec 2023 12:00) (86 - 102)  RR: 16 (14 Dec 2023 12:00) (12 - 18)  SpO2: 99% (14 Dec 2023 12:00) (97% - 99%)    Parameters below as of 14 Dec 2023 12:00  Patient On (Oxygen Delivery Method): room air        PHYSICAL EXAM:    GENERAL: NAD  CHEST/LUNG: Clear to auscultation bilaterally; respirations unlabored on RA   HEART: S1S2+, Regular rate and rhythm  ABDOMEN: Soft, Nontender, Nondistended; Bowel sounds present  SKIN: warm, dry   NEURO: awake, alert; strength 3/5 RUE, RLE; withdraws to pain LUE/LLE  PSYCH: normal affect     LABS:                        7.6    7.02  )-----------( 146      ( 14 Dec 2023 05:30 )             23.5     12-14    139  |  105  |  14.6  ----------------------------<  110<H>  4.1   |  21.0<L>  |  0.80    Ca    8.4      14 Dec 2023 05:30  Phos  3.0     12-14  Mg     1.7     12-14        Urinalysis Basic - ( 14 Dec 2023 05:30 )    Color: x / Appearance: x / SG: x / pH: x  Gluc: 110 mg/dL / Ketone: x  / Bili: x / Urobili: x   Blood: x / Protein: x / Nitrite: x   Leuk Esterase: x / RBC: x / WBC x   Sq Epi: x / Non Sq Epi: x / Bacteria: x          MEDICATIONS  (STANDING):  apixaban 5 milliGRAM(s) Oral two times a day  aspirin enteric coated 81 milliGRAM(s) Oral daily  atorvastatin 80 milliGRAM(s) Oral at bedtime  cefTRIAXone Injectable. 1000 milliGRAM(s) IV Push every 24 hours  diltiazem    Tablet 60 milliGRAM(s) Oral <User Schedule>  doxazosin 2 milliGRAM(s) Oral at bedtime  folic acid 1 milliGRAM(s) Oral daily  levETIRAcetam  IVPB 500 milliGRAM(s) IV Intermittent every 12 hours  melatonin 5 milliGRAM(s) Oral at bedtime  memantine 5 milliGRAM(s) Oral two times a day  metoprolol tartrate 50 milliGRAM(s) Oral <User Schedule>  Nephro-roma 1 Tablet(s) Oral daily  polyethylene glycol 3350 17 Gram(s) Oral daily  senna 2 Tablet(s) Oral at bedtime  sodium chloride 0.9% lock flush 3 milliLiter(s) IV Push every 8 hours  sodium chloride 0.9%. 1000 milliLiter(s) (75 mL/Hr) IV Continuous <Continuous>    MEDICATIONS  (PRN):  acetaminophen     Tablet .. 650 milliGRAM(s) Oral every 6 hours PRN Temp greater or equal to 38C (100.4F), Mild Pain (1 - 3)  artificial tears (preservative free) Ophthalmic Solution 1 Drop(s) Both EYES every 6 hours PRN Dry Eyes  hydrALAZINE Injectable 10 milliGRAM(s) IV Push every 2 hours PRN SBP > 160 mmHg  labetalol Injectable 10 milliGRAM(s) IV Push every 2 hours PRN Systolic blood pressure > 160  ondansetron Injectable 4 milliGRAM(s) IV Push every 6 hours PRN Nausea and/or Vomiting  oxyCODONE    IR 5 milliGRAM(s) Oral every 4 hours PRN Moderate Pain (4 - 6)  oxyCODONE    IR 10 milliGRAM(s) Oral every 4 hours PRN Severe Pain (7 - 10)      RADIOLOGY & ADDITIONAL TESTS:   DEUCE BALL    [id number]    83y      Male    CC: s/p fall     INTERVAL HPI/OVERNIGHT EVENTS: Pt seen and examined. no acute events reported o/n      REVIEW OF SYSTEMS:    RESPIRATORY: No cough, wheezing, hemoptysis  CARDIOVASCULAR: No chest pain, palpitations  GASTROINTESTINAL: No abdominal or epigastric pain. No nausea, vomiting  NEUROLOGICAL: No headaches    Vital Signs Last 24 Hrs  T(C): 36.6 (14 Dec 2023 07:58), Max: 37.2 (14 Dec 2023 00:00)  T(F): 97.9 (14 Dec 2023 07:58), Max: 99 (14 Dec 2023 00:00)  HR: 89 (14 Dec 2023 12:00) (78 - 104)  BP: 156/79 (14 Dec 2023 12:00) (137/81 - 156/79)  BP(mean): 102 (14 Dec 2023 12:00) (86 - 102)  RR: 16 (14 Dec 2023 12:00) (12 - 18)  SpO2: 99% (14 Dec 2023 12:00) (97% - 99%)    Parameters below as of 14 Dec 2023 12:00  Patient On (Oxygen Delivery Method): room air        PHYSICAL EXAM:    GENERAL: NAD  CHEST/LUNG: Clear to auscultation bilaterally; respirations unlabored on RA   HEART: S1S2+, Regular rate and rhythm  ABDOMEN: Soft, Nontender, Nondistended; Bowel sounds present  SKIN: warm, dry   NEURO: awake, alert; strength 3/5 RUE, RLE; withdraws to pain LUE/LLE  PSYCH: normal affect     LABS:                        7.6    7.02  )-----------( 146      ( 14 Dec 2023 05:30 )             23.5     12-14    139  |  105  |  14.6  ----------------------------<  110<H>  4.1   |  21.0<L>  |  0.80    Ca    8.4      14 Dec 2023 05:30  Phos  3.0     12-14  Mg     1.7     12-14        Urinalysis Basic - ( 14 Dec 2023 05:30 )    Color: x / Appearance: x / SG: x / pH: x  Gluc: 110 mg/dL / Ketone: x  / Bili: x / Urobili: x   Blood: x / Protein: x / Nitrite: x   Leuk Esterase: x / RBC: x / WBC x   Sq Epi: x / Non Sq Epi: x / Bacteria: x          MEDICATIONS  (STANDING):  apixaban 5 milliGRAM(s) Oral two times a day  aspirin enteric coated 81 milliGRAM(s) Oral daily  atorvastatin 80 milliGRAM(s) Oral at bedtime  cefTRIAXone Injectable. 1000 milliGRAM(s) IV Push every 24 hours  diltiazem    Tablet 60 milliGRAM(s) Oral <User Schedule>  doxazosin 2 milliGRAM(s) Oral at bedtime  folic acid 1 milliGRAM(s) Oral daily  levETIRAcetam  IVPB 500 milliGRAM(s) IV Intermittent every 12 hours  melatonin 5 milliGRAM(s) Oral at bedtime  memantine 5 milliGRAM(s) Oral two times a day  metoprolol tartrate 50 milliGRAM(s) Oral <User Schedule>  Nephro-roma 1 Tablet(s) Oral daily  polyethylene glycol 3350 17 Gram(s) Oral daily  senna 2 Tablet(s) Oral at bedtime  sodium chloride 0.9% lock flush 3 milliLiter(s) IV Push every 8 hours  sodium chloride 0.9%. 1000 milliLiter(s) (75 mL/Hr) IV Continuous <Continuous>    MEDICATIONS  (PRN):  acetaminophen     Tablet .. 650 milliGRAM(s) Oral every 6 hours PRN Temp greater or equal to 38C (100.4F), Mild Pain (1 - 3)  artificial tears (preservative free) Ophthalmic Solution 1 Drop(s) Both EYES every 6 hours PRN Dry Eyes  hydrALAZINE Injectable 10 milliGRAM(s) IV Push every 2 hours PRN SBP > 160 mmHg  labetalol Injectable 10 milliGRAM(s) IV Push every 2 hours PRN Systolic blood pressure > 160  ondansetron Injectable 4 milliGRAM(s) IV Push every 6 hours PRN Nausea and/or Vomiting  oxyCODONE    IR 5 milliGRAM(s) Oral every 4 hours PRN Moderate Pain (4 - 6)  oxyCODONE    IR 10 milliGRAM(s) Oral every 4 hours PRN Severe Pain (7 - 10)      RADIOLOGY & ADDITIONAL TESTS:

## 2023-12-15 LAB
ANION GAP SERPL CALC-SCNC: 10 MMOL/L — SIGNIFICANT CHANGE UP (ref 5–17)
ANION GAP SERPL CALC-SCNC: 10 MMOL/L — SIGNIFICANT CHANGE UP (ref 5–17)
BUN SERPL-MCNC: 12.5 MG/DL — SIGNIFICANT CHANGE UP (ref 8–20)
BUN SERPL-MCNC: 12.5 MG/DL — SIGNIFICANT CHANGE UP (ref 8–20)
CALCIUM SERPL-MCNC: 8.3 MG/DL — LOW (ref 8.4–10.5)
CALCIUM SERPL-MCNC: 8.3 MG/DL — LOW (ref 8.4–10.5)
CHLORIDE SERPL-SCNC: 106 MMOL/L — SIGNIFICANT CHANGE UP (ref 96–108)
CHLORIDE SERPL-SCNC: 106 MMOL/L — SIGNIFICANT CHANGE UP (ref 96–108)
CO2 SERPL-SCNC: 22 MMOL/L — SIGNIFICANT CHANGE UP (ref 22–29)
CO2 SERPL-SCNC: 22 MMOL/L — SIGNIFICANT CHANGE UP (ref 22–29)
CREAT SERPL-MCNC: 0.67 MG/DL — SIGNIFICANT CHANGE UP (ref 0.5–1.3)
CREAT SERPL-MCNC: 0.67 MG/DL — SIGNIFICANT CHANGE UP (ref 0.5–1.3)
CULTURE RESULTS: SIGNIFICANT CHANGE UP
EGFR: 93 ML/MIN/1.73M2 — SIGNIFICANT CHANGE UP
EGFR: 93 ML/MIN/1.73M2 — SIGNIFICANT CHANGE UP
GLUCOSE SERPL-MCNC: 102 MG/DL — HIGH (ref 70–99)
GLUCOSE SERPL-MCNC: 102 MG/DL — HIGH (ref 70–99)
HCT VFR BLD CALC: 23.6 % — LOW (ref 39–50)
HCT VFR BLD CALC: 23.6 % — LOW (ref 39–50)
HGB BLD-MCNC: 7.7 G/DL — LOW (ref 13–17)
HGB BLD-MCNC: 7.7 G/DL — LOW (ref 13–17)
MAGNESIUM SERPL-MCNC: 1.6 MG/DL — SIGNIFICANT CHANGE UP (ref 1.6–2.6)
MAGNESIUM SERPL-MCNC: 1.6 MG/DL — SIGNIFICANT CHANGE UP (ref 1.6–2.6)
MCHC RBC-ENTMCNC: 31.8 PG — SIGNIFICANT CHANGE UP (ref 27–34)
MCHC RBC-ENTMCNC: 31.8 PG — SIGNIFICANT CHANGE UP (ref 27–34)
MCHC RBC-ENTMCNC: 32.6 GM/DL — SIGNIFICANT CHANGE UP (ref 32–36)
MCHC RBC-ENTMCNC: 32.6 GM/DL — SIGNIFICANT CHANGE UP (ref 32–36)
MCV RBC AUTO: 97.5 FL — SIGNIFICANT CHANGE UP (ref 80–100)
MCV RBC AUTO: 97.5 FL — SIGNIFICANT CHANGE UP (ref 80–100)
PHOSPHATE SERPL-MCNC: 3 MG/DL — SIGNIFICANT CHANGE UP (ref 2.4–4.7)
PHOSPHATE SERPL-MCNC: 3 MG/DL — SIGNIFICANT CHANGE UP (ref 2.4–4.7)
PLATELET # BLD AUTO: 139 K/UL — LOW (ref 150–400)
PLATELET # BLD AUTO: 139 K/UL — LOW (ref 150–400)
POTASSIUM SERPL-MCNC: 3.9 MMOL/L — SIGNIFICANT CHANGE UP (ref 3.5–5.3)
POTASSIUM SERPL-MCNC: 3.9 MMOL/L — SIGNIFICANT CHANGE UP (ref 3.5–5.3)
POTASSIUM SERPL-SCNC: 3.9 MMOL/L — SIGNIFICANT CHANGE UP (ref 3.5–5.3)
POTASSIUM SERPL-SCNC: 3.9 MMOL/L — SIGNIFICANT CHANGE UP (ref 3.5–5.3)
RBC # BLD: 2.42 M/UL — LOW (ref 4.2–5.8)
RBC # BLD: 2.42 M/UL — LOW (ref 4.2–5.8)
RBC # FLD: 14.3 % — SIGNIFICANT CHANGE UP (ref 10.3–14.5)
RBC # FLD: 14.3 % — SIGNIFICANT CHANGE UP (ref 10.3–14.5)
SODIUM SERPL-SCNC: 138 MMOL/L — SIGNIFICANT CHANGE UP (ref 135–145)
SODIUM SERPL-SCNC: 138 MMOL/L — SIGNIFICANT CHANGE UP (ref 135–145)
SPECIMEN SOURCE: SIGNIFICANT CHANGE UP
WBC # BLD: 6.66 K/UL — SIGNIFICANT CHANGE UP (ref 3.8–10.5)
WBC # BLD: 6.66 K/UL — SIGNIFICANT CHANGE UP (ref 3.8–10.5)
WBC # FLD AUTO: 6.66 K/UL — SIGNIFICANT CHANGE UP (ref 3.8–10.5)
WBC # FLD AUTO: 6.66 K/UL — SIGNIFICANT CHANGE UP (ref 3.8–10.5)

## 2023-12-15 PROCEDURE — 99232 SBSQ HOSP IP/OBS MODERATE 35: CPT

## 2023-12-15 PROCEDURE — 99233 SBSQ HOSP IP/OBS HIGH 50: CPT | Mod: GC

## 2023-12-15 PROCEDURE — 70450 CT HEAD/BRAIN W/O DYE: CPT | Mod: 26

## 2023-12-15 RX ORDER — MODAFINIL 200 MG/1
100 TABLET ORAL
Refills: 0 | Status: DISCONTINUED | OUTPATIENT
Start: 2023-12-15 | End: 2023-12-21

## 2023-12-15 RX ADMIN — Medication 50 MILLIGRAM(S): at 00:15

## 2023-12-15 RX ADMIN — SODIUM CHLORIDE 75 MILLILITER(S): 9 INJECTION INTRAMUSCULAR; INTRAVENOUS; SUBCUTANEOUS at 17:35

## 2023-12-15 RX ADMIN — SODIUM CHLORIDE 75 MILLILITER(S): 9 INJECTION INTRAMUSCULAR; INTRAVENOUS; SUBCUTANEOUS at 05:44

## 2023-12-15 RX ADMIN — MODAFINIL 100 MILLIGRAM(S): 200 TABLET ORAL at 12:26

## 2023-12-15 RX ADMIN — Medication 81 MILLIGRAM(S): at 12:26

## 2023-12-15 RX ADMIN — Medication 60 MILLIGRAM(S): at 05:45

## 2023-12-15 RX ADMIN — CEFTRIAXONE 1000 MILLIGRAM(S): 500 INJECTION, POWDER, FOR SOLUTION INTRAMUSCULAR; INTRAVENOUS at 21:55

## 2023-12-15 RX ADMIN — MEMANTINE HYDROCHLORIDE 5 MILLIGRAM(S): 10 TABLET ORAL at 17:25

## 2023-12-15 RX ADMIN — Medication 50 MILLIGRAM(S): at 12:31

## 2023-12-15 RX ADMIN — MEMANTINE HYDROCHLORIDE 5 MILLIGRAM(S): 10 TABLET ORAL at 05:45

## 2023-12-15 RX ADMIN — Medication 1 TABLET(S): at 12:27

## 2023-12-15 RX ADMIN — POLYETHYLENE GLYCOL 3350 17 GRAM(S): 17 POWDER, FOR SOLUTION ORAL at 12:25

## 2023-12-15 RX ADMIN — SODIUM CHLORIDE 3 MILLILITER(S): 9 INJECTION INTRAMUSCULAR; INTRAVENOUS; SUBCUTANEOUS at 14:14

## 2023-12-15 RX ADMIN — APIXABAN 5 MILLIGRAM(S): 2.5 TABLET, FILM COATED ORAL at 05:45

## 2023-12-15 RX ADMIN — APIXABAN 5 MILLIGRAM(S): 2.5 TABLET, FILM COATED ORAL at 17:26

## 2023-12-15 RX ADMIN — SENNA PLUS 2 TABLET(S): 8.6 TABLET ORAL at 21:56

## 2023-12-15 RX ADMIN — Medication 60 MILLIGRAM(S): at 17:34

## 2023-12-15 RX ADMIN — Medication 1 MILLIGRAM(S): at 12:26

## 2023-12-15 RX ADMIN — LEVETIRACETAM 400 MILLIGRAM(S): 250 TABLET, FILM COATED ORAL at 17:26

## 2023-12-15 RX ADMIN — Medication 2 MILLIGRAM(S): at 21:55

## 2023-12-15 RX ADMIN — ATORVASTATIN CALCIUM 80 MILLIGRAM(S): 80 TABLET, FILM COATED ORAL at 21:55

## 2023-12-15 RX ADMIN — SODIUM CHLORIDE 3 MILLILITER(S): 9 INJECTION INTRAMUSCULAR; INTRAVENOUS; SUBCUTANEOUS at 06:44

## 2023-12-15 RX ADMIN — Medication 5 MILLIGRAM(S): at 21:55

## 2023-12-15 RX ADMIN — LEVETIRACETAM 400 MILLIGRAM(S): 250 TABLET, FILM COATED ORAL at 05:44

## 2023-12-15 RX ADMIN — SODIUM CHLORIDE 3 MILLILITER(S): 9 INJECTION INTRAMUSCULAR; INTRAVENOUS; SUBCUTANEOUS at 22:28

## 2023-12-15 NOTE — PROGRESS NOTE ADULT - SUBJECTIVE AND OBJECTIVE BOX
DEUCE BALL    [id number]    83y      Male    CC: s/p fall     INTERVAL HPI/OVERNIGHT EVENTS: pt seen and examined. resumed AC yesterday     REVIEW OF SYSTEMS:    RESPIRATORY: No cough, wheezing, hemoptysis; No shortness of breath  CARDIOVASCULAR: No chest pain, palpitations  GASTROINTESTINAL: No abdominal or epigastric pain. No nausea, vomiting    Vital Signs Last 24 Hrs  T(C): 37 (15 Dec 2023 12:00), Max: 37.3 (14 Dec 2023 19:30)  T(F): 98.6 (15 Dec 2023 12:00), Max: 99.1 (14 Dec 2023 19:30)  HR: 96 (15 Dec 2023 12:00) (61 - 97)  BP: 137/66 (15 Dec 2023 12:00) (105/59 - 165/96)  BP(mean): 88 (15 Dec 2023 12:00) (74 - 115)  RR: 18 (15 Dec 2023 12:00) (16 - 20)  SpO2: 98% (15 Dec 2023 12:00) (96% - 98%)    Parameters below as of 15 Dec 2023 12:00  Patient On (Oxygen Delivery Method): room air        PHYSICAL EXAM:    GENERAL: NAD  CHEST/LUNG: Clear to auscultation bilaterally; respirations unlabored on RA   HEART: S1S2+, Regular rate and rhythm  ABDOMEN: Soft, Nontender, Nondistended; Bowel sounds present  SKIN: warm, dry   NEURO: awake, alert; strength 3/5 RUE, RLE; withdraws to pain LUE/LLE  PSYCH: normal affect     LABS:                        7.7    6.66  )-----------( 139      ( 15 Dec 2023 07:12 )             23.6     12-15    138  |  106  |  12.5  ----------------------------<  102<H>  3.9   |  22.0  |  0.67    Ca    8.3<L>      15 Dec 2023 07:12  Phos  3.0     12-15  Mg     1.6     12-15        Urinalysis Basic - ( 15 Dec 2023 07:12 )    Color: x / Appearance: x / SG: x / pH: x  Gluc: 102 mg/dL / Ketone: x  / Bili: x / Urobili: x   Blood: x / Protein: x / Nitrite: x   Leuk Esterase: x / RBC: x / WBC x   Sq Epi: x / Non Sq Epi: x / Bacteria: x          MEDICATIONS  (STANDING):  apixaban 5 milliGRAM(s) Oral two times a day  aspirin enteric coated 81 milliGRAM(s) Oral daily  atorvastatin 80 milliGRAM(s) Oral at bedtime  cefTRIAXone Injectable. 1000 milliGRAM(s) IV Push every 24 hours  diltiazem    Tablet 60 milliGRAM(s) Oral <User Schedule>  doxazosin 2 milliGRAM(s) Oral at bedtime  folic acid 1 milliGRAM(s) Oral daily  levETIRAcetam  IVPB 500 milliGRAM(s) IV Intermittent every 12 hours  melatonin 5 milliGRAM(s) Oral at bedtime  memantine 5 milliGRAM(s) Oral two times a day  metoprolol tartrate 50 milliGRAM(s) Oral <User Schedule>  modafinil 100 milliGRAM(s) Oral <User Schedule>  Nephro-roma 1 Tablet(s) Oral daily  polyethylene glycol 3350 17 Gram(s) Oral daily  senna 2 Tablet(s) Oral at bedtime  sodium chloride 0.9% lock flush 3 milliLiter(s) IV Push every 8 hours  sodium chloride 0.9%. 1000 milliLiter(s) (75 mL/Hr) IV Continuous <Continuous>    MEDICATIONS  (PRN):  acetaminophen     Tablet .. 650 milliGRAM(s) Oral every 6 hours PRN Temp greater or equal to 38C (100.4F), Mild Pain (1 - 3)  artificial tears (preservative free) Ophthalmic Solution 1 Drop(s) Both EYES every 6 hours PRN Dry Eyes  hydrALAZINE Injectable 10 milliGRAM(s) IV Push every 2 hours PRN SBP > 160 mmHg  labetalol Injectable 10 milliGRAM(s) IV Push every 2 hours PRN Systolic blood pressure > 160  ondansetron Injectable 4 milliGRAM(s) IV Push every 6 hours PRN Nausea and/or Vomiting      RADIOLOGY & ADDITIONAL TESTS:   DEUCE BALL    [id number]    83y      Male    CC: s/p fall     INTERVAL HPI/OVERNIGHT EVENTS: pt seen and examined. resumed AC yesterday     REVIEW OF SYSTEMS:    RESPIRATORY: No cough, wheezing, hemoptysis; No shortness of breath  CARDIOVASCULAR: No chest pain, palpitations  GASTROINTESTINAL: No abdominal or epigastric pain. No nausea, vomiting    Vital Signs Last 24 Hrs  T(C): 37 (15 Dec 2023 12:00), Max: 37.3 (14 Dec 2023 19:30)  T(F): 98.6 (15 Dec 2023 12:00), Max: 99.1 (14 Dec 2023 19:30)  HR: 96 (15 Dec 2023 12:00) (61 - 97)  BP: 137/66 (15 Dec 2023 12:00) (105/59 - 165/96)  BP(mean): 88 (15 Dec 2023 12:00) (74 - 115)  RR: 18 (15 Dec 2023 12:00) (16 - 20)  SpO2: 98% (15 Dec 2023 12:00) (96% - 98%)    Parameters below as of 15 Dec 2023 12:00  Patient On (Oxygen Delivery Method): room air        PHYSICAL EXAM:    GENERAL: NAD  CHEST/LUNG: Clear to auscultation bilaterally; respirations unlabored on RA   HEART: S1S2+, Regular rate and rhythm  ABDOMEN: Soft, Nontender, Nondistended; Bowel sounds present  SKIN: warm, dry   NEURO: awake, alert; strength 3/5 RUE, RLE; withdraws to pain LUE/LLE  PSYCH: normal affect     LABS:                        7.7    6.66  )-----------( 139      ( 15 Dec 2023 07:12 )             23.6     12-15    138  |  106  |  12.5  ----------------------------<  102<H>  3.9   |  22.0  |  0.67    Ca    8.3<L>      15 Dec 2023 07:12  Phos  3.0     12-15  Mg     1.6     12-15        Urinalysis Basic - ( 15 Dec 2023 07:12 )    Color: x / Appearance: x / SG: x / pH: x  Gluc: 102 mg/dL / Ketone: x  / Bili: x / Urobili: x   Blood: x / Protein: x / Nitrite: x   Leuk Esterase: x / RBC: x / WBC x   Sq Epi: x / Non Sq Epi: x / Bacteria: x          MEDICATIONS  (STANDING):  apixaban 5 milliGRAM(s) Oral two times a day  aspirin enteric coated 81 milliGRAM(s) Oral daily  atorvastatin 80 milliGRAM(s) Oral at bedtime  cefTRIAXone Injectable. 1000 milliGRAM(s) IV Push every 24 hours  diltiazem    Tablet 60 milliGRAM(s) Oral <User Schedule>  doxazosin 2 milliGRAM(s) Oral at bedtime  folic acid 1 milliGRAM(s) Oral daily  levETIRAcetam  IVPB 500 milliGRAM(s) IV Intermittent every 12 hours  melatonin 5 milliGRAM(s) Oral at bedtime  memantine 5 milliGRAM(s) Oral two times a day  metoprolol tartrate 50 milliGRAM(s) Oral <User Schedule>  modafinil 100 milliGRAM(s) Oral <User Schedule>  Nephro-roma 1 Tablet(s) Oral daily  polyethylene glycol 3350 17 Gram(s) Oral daily  senna 2 Tablet(s) Oral at bedtime  sodium chloride 0.9% lock flush 3 milliLiter(s) IV Push every 8 hours  sodium chloride 0.9%. 1000 milliLiter(s) (75 mL/Hr) IV Continuous <Continuous>    MEDICATIONS  (PRN):  acetaminophen     Tablet .. 650 milliGRAM(s) Oral every 6 hours PRN Temp greater or equal to 38C (100.4F), Mild Pain (1 - 3)  artificial tears (preservative free) Ophthalmic Solution 1 Drop(s) Both EYES every 6 hours PRN Dry Eyes  hydrALAZINE Injectable 10 milliGRAM(s) IV Push every 2 hours PRN SBP > 160 mmHg  labetalol Injectable 10 milliGRAM(s) IV Push every 2 hours PRN Systolic blood pressure > 160  ondansetron Injectable 4 milliGRAM(s) IV Push every 6 hours PRN Nausea and/or Vomiting      RADIOLOGY & ADDITIONAL TESTS:

## 2023-12-15 NOTE — CHART NOTE - NSCHARTNOTEFT_GEN_A_CORE
CTH obtained s/p starting Eliquis yesterday. CTH shows small new R frontal ICH.    Pt's exam improved from my previous exam last night and documented exam yesterday. Pt sitting in bed watching TV with daughter. Opens eyes spontaneously, AOx3 (oriented to name, place w/ choices, upcoming holiday "Christmas"). RUE 4+/5 w/ some mild R hand tremor, RLE able to wiggle toes and bend knee, LUE/LLE trace withdrawal     Plan:    -Continue q2h neuro checks  -Repeat CTH in 4 hrs (~12am)  -D/c Eliquis and ASA  -Hold any DVT chemoprophylaxis  -Discussed w/ medicine PA    -Discussed w/ Dr. Larios      CT Head No Cont (12.15.23 @ 19:59)     IMPRESSION:  1.  New 17 x 13 mm hemorrhage in the medial right frontal lobe.  2.  Mild interval decrease in size of the right hemispheric heterogeneous   subdural hematoma.  3.  Mild interval increased thickness of the hypodense fluid collection   superficial of the cranioplasty site.  4.  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

## 2023-12-15 NOTE — PROGRESS NOTE ADULT - SUBJECTIVE AND OBJECTIVE BOX
INTERVAL HPI/OVERNIGHT EVENTS:  Pt post op day 11/10 right hemicraniectomy, Cranioplasty on 12/7     83M w/ PMH HTN, CAD s/p stents on ASA/Plavix, RCC s/p L nephrectomy, bladder CA, BPH, CHF, LBBB, vertigo, HLD, 5.5 cm AAA without rupture post EVAR, paroxysmal afib on Eliquis, early stage Alzheimer's dementia, presented to [facility] 11/10/23 s/p unwitnessed fall with head strike at home found by wife, found with multicompartmental ICH, s/p R craniectomy 11/10/23, course significant for Klebsiella pneumonia, hypernatremia, lethargy, LUE DVT, new anterior falcine SDH, downgraded to SDU 11/28  s/p R cranioplasty POD#8  Patient seen and examined this morning with neurosurgical team. Flap full, soft. CTH pending prior to Eliquis. Will need CTH 24 hours after Eliquis also. LBM: 12/10      MEDICATIONS  (STANDING):  apixaban 5 milliGRAM(s) Oral two times a day  aspirin enteric coated 81 milliGRAM(s) Oral daily  atorvastatin 80 milliGRAM(s) Oral at bedtime  cefTRIAXone Injectable. 1000 milliGRAM(s) IV Push every 24 hours  diltiazem    Tablet 60 milliGRAM(s) Oral <User Schedule>  doxazosin 2 milliGRAM(s) Oral at bedtime  folic acid 1 milliGRAM(s) Oral daily  levETIRAcetam  IVPB 500 milliGRAM(s) IV Intermittent every 12 hours  melatonin 5 milliGRAM(s) Oral at bedtime  memantine 5 milliGRAM(s) Oral two times a day  metoprolol tartrate 50 milliGRAM(s) Oral <User Schedule>  modafinil 100 milliGRAM(s) Oral <User Schedule>  Nephro-roma 1 Tablet(s) Oral daily  polyethylene glycol 3350 17 Gram(s) Oral daily  senna 2 Tablet(s) Oral at bedtime  sodium chloride 0.9% lock flush 3 milliLiter(s) IV Push every 8 hours  sodium chloride 0.9%. 1000 milliLiter(s) (75 mL/Hr) IV Continuous <Continuous>    MEDICATIONS  (PRN):  acetaminophen     Tablet .. 650 milliGRAM(s) Oral every 6 hours PRN Temp greater or equal to 38C (100.4F), Mild Pain (1 - 3)  artificial tears (preservative free) Ophthalmic Solution 1 Drop(s) Both EYES every 6 hours PRN Dry Eyes  hydrALAZINE Injectable 10 milliGRAM(s) IV Push every 2 hours PRN SBP > 160 mmHg  labetalol Injectable 10 milliGRAM(s) IV Push every 2 hours PRN Systolic blood pressure > 160  ondansetron Injectable 4 milliGRAM(s) IV Push every 6 hours PRN Nausea and/or Vomiting      Allergies    penicillin (Rash)  heparin (Other (Severe))  penicillin (Hives)  Cipro (Other)  Levaquin (Other)  heparin (Other)    Intolerances          Vital Signs Last 24 Hrs  T(C): 36.9 (15 Dec 2023 09:25), Max: 37.3 (14 Dec 2023 19:30)  T(F): 98.4 (15 Dec 2023 09:25), Max: 99.1 (14 Dec 2023 19:30)  HR: 61 (15 Dec 2023 08:00) (61 - 97)  BP: 112/56 (15 Dec 2023 08:00) (105/59 - 165/96)  BP(mean): 74 (15 Dec 2023 08:00) (74 - 115)  RR: 16 (15 Dec 2023 04:00) (16 - 20)  SpO2: 98% (15 Dec 2023 08:00) (96% - 99%)    Parameters below as of 15 Dec 2023 08:00  Patient On (Oxygen Delivery Method): room air     BMI (kg/m2): 25.1 (12-07-23 @ 15:09)      PHYSICAL EXAM  GENERAL: NAD, well-groomed, well-developed  HEAD:  Atraumatic, Normocephalic  EYES: EOMI, PERRLA, conjunctiva and sclera clear  ENMT: No tonsillar erythema, exudates, or enlargement; Moist mucous membranes, Good dentition, No lesions  NECK: Supple, No JVD, Normal thyroid  NERVOUS SYSTEM:  Alert & Oriented X3, Good concentration; Motor Strength 5/5 B/L upper and lower extremities; DTRs 2+ intact and symmetric  CHEST/LUNG: Clear to percussion bilaterally; No rales, rhonchi, wheezing, or rubs  HEART: Regular rate and rhythm; No murmurs, rubs, or gallops  ABDOMEN: Soft, Nontender, Nondistended; Bowel sounds present  EXTREMITIES:  2+ Peripheral Pulses, No clubbing, cyanosis, or edema  LYMPH: No lymphadenopathy noted  SKIN: No rashes or lesions      LABS:                          7.7    6.66  )-----------( 139      ( 15 Dec 2023 07:12 )             23.6     12-15    138  |  106  |  12.5  ----------------------------<  102<H>  3.9   |  22.0  |  0.67    Ca    8.3<L>      15 Dec 2023 07:12  Phos  3.0     12-15  Mg     1.6     12-15        Urinalysis Basic - ( 15 Dec 2023 07:12 )    Color: x / Appearance: x / SG: x / pH: x  Gluc: 102 mg/dL / Ketone: x  / Bili: x / Urobili: x   Blood: x / Protein: x / Nitrite: x   Leuk Esterase: x / RBC: x / WBC x   Sq Epi: x / Non Sq Epi: x / Bacteria: x      I&O's Detail    14 Dec 2023 07:01  -  15 Dec 2023 07:00  --------------------------------------------------------  IN:    sodium chloride 0.9%: 825 mL  Total IN: 825 mL    OUT:    Voided (mL): 3150 mL  Total OUT: 3150 mL    Total NET: -2325 mL        RADIOLOGY & ADDITIONAL TESTS:   INTERVAL HPI/OVERNIGHT EVENTS:  Pt post op day 11/10 right hemicraniectomy, Cranioplasty on 12/7     83M w/ PMH HTN, CAD s/p stents on ASA/Plavix, RCC s/p L nephrectomy, bladder CA, BPH, CHF, LBBB, vertigo, HLD, 5.5 cm AAA without rupture post EVAR, paroxysmal afib on Eliquis, early stage Alzheimer's dementia, presented to [facility] 11/10/23 s/p unwitnessed fall with head strike at home found by wife, found with multicompartmental ICH, s/p R craniectomy 11/10/23, course significant for Klebsiella pneumonia, hypernatremia, lethargy, LUE DVT, new anterior falcine SDH, downgraded to SDU 11/28  s/p R cranioplasty POD#8  Patient seen and examined this morning with neurosurgical team. Flap full, soft. CTH pending prior to Eliquis. Will need CTH 24 hours after Eliquis also. LBM: 12/10      MEDICATIONS  (STANDING):  apixaban 5 milliGRAM(s) Oral two times a day  aspirin enteric coated 81 milliGRAM(s) Oral daily  atorvastatin 80 milliGRAM(s) Oral at bedtime  cefTRIAXone Injectable. 1000 milliGRAM(s) IV Push every 24 hours  diltiazem    Tablet 60 milliGRAM(s) Oral <User Schedule>  doxazosin 2 milliGRAM(s) Oral at bedtime  folic acid 1 milliGRAM(s) Oral daily  levETIRAcetam  IVPB 500 milliGRAM(s) IV Intermittent every 12 hours  melatonin 5 milliGRAM(s) Oral at bedtime  memantine 5 milliGRAM(s) Oral two times a day  metoprolol tartrate 50 milliGRAM(s) Oral <User Schedule>  modafinil 100 milliGRAM(s) Oral <User Schedule>  Nephro-roma 1 Tablet(s) Oral daily  polyethylene glycol 3350 17 Gram(s) Oral daily  senna 2 Tablet(s) Oral at bedtime  sodium chloride 0.9% lock flush 3 milliLiter(s) IV Push every 8 hours  sodium chloride 0.9%. 1000 milliLiter(s) (75 mL/Hr) IV Continuous <Continuous>    MEDICATIONS  (PRN):  acetaminophen     Tablet .. 650 milliGRAM(s) Oral every 6 hours PRN Temp greater or equal to 38C (100.4F), Mild Pain (1 - 3)  artificial tears (preservative free) Ophthalmic Solution 1 Drop(s) Both EYES every 6 hours PRN Dry Eyes  hydrALAZINE Injectable 10 milliGRAM(s) IV Push every 2 hours PRN SBP > 160 mmHg  labetalol Injectable 10 milliGRAM(s) IV Push every 2 hours PRN Systolic blood pressure > 160  ondansetron Injectable 4 milliGRAM(s) IV Push every 6 hours PRN Nausea and/or Vomiting      Allergies    penicillin (Rash)  heparin (Other (Severe))  penicillin (Hives)  Cipro (Other)  Levaquin (Other)  heparin (Other)    Intolerances          Vital Signs Last 24 Hrs  T(C): 36.9 (15 Dec 2023 09:25), Max: 37.3 (14 Dec 2023 19:30)  T(F): 98.4 (15 Dec 2023 09:25), Max: 99.1 (14 Dec 2023 19:30)  HR: 61 (15 Dec 2023 08:00) (61 - 97)  BP: 112/56 (15 Dec 2023 08:00) (105/59 - 165/96)  BP(mean): 74 (15 Dec 2023 08:00) (74 - 115)  RR: 16 (15 Dec 2023 04:00) (16 - 20)  SpO2: 98% (15 Dec 2023 08:00) (96% - 99%)    Parameters below as of 15 Dec 2023 08:00  Patient On (Oxygen Delivery Method): room air     BMI (kg/m2): 25.1 (12-07-23 @ 15:09)      PHYSICAL EXAM  GENERAL: NAD, well-groomed, well-developed  HEAD:  Atraumatic, Normocephalic  EYES: EOMI, PERRLA, conjunctiva and sclera clear  ENMT: No tonsillar erythema, exudates, or enlargement; Moist mucous membranes, Good dentition, No lesions  NECK: Supple, No JVD, Normal thyroid  NERVOUS SYSTEM:  Alert & Oriented X3, Good concentration; Motor Strength 5/5 B/L upper and lower extremities; DTRs 2+ intact and symmetric  CHEST/LUNG: Clear to percussion bilaterally; No rales, rhonchi, wheezing, or rubs  HEART: Regular rate and rhythm; No murmurs, rubs, or gallops  ABDOMEN: Soft, Nontender, Nondistended; Bowel sounds present  EXTREMITIES:  2+ Peripheral Pulses, No clubbing, cyanosis, or edema  LYMPH: No lymphadenopathy noted  SKIN: No rashes or lesions      LABS:                          7.7    6.66  )-----------( 139      ( 15 Dec 2023 07:12 )             23.6     12-15    138  |  106  |  12.5  ----------------------------<  102<H>  3.9   |  22.0  |  0.67    Ca    8.3<L>      15 Dec 2023 07:12  Phos  3.0     12-15  Mg     1.6     12-15        Urinalysis Basic - ( 15 Dec 2023 07:12 )    Color: x / Appearance: x / SG: x / pH: x  Gluc: 102 mg/dL / Ketone: x  / Bili: x / Urobili: x   Blood: x / Protein: x / Nitrite: x   Leuk Esterase: x / RBC: x / WBC x   Sq Epi: x / Non Sq Epi: x / Bacteria: x      I&O's Detail    14 Dec 2023 07:01  -  15 Dec 2023 07:00  --------------------------------------------------------  IN:    sodium chloride 0.9%: 825 mL  Total IN: 825 mL    OUT:    Voided (mL): 3150 mL  Total OUT: 3150 mL    Total NET: -2325 mL        RADIOLOGY & ADDITIONAL TESTS:   INTERVAL HPI/OVERNIGHT EVENTS:  Pt post op day 11/10 right hemicraniectomy, Cranioplasty on 12/7     83M w/ PMH HTN, CAD s/p stents on ASA/Plavix, RCC s/p L nephrectomy, bladder CA, BPH, CHF, LBBB, vertigo, HLD, 5.5 cm AAA without rupture post EVAR, paroxysmal afib on Eliquis, early stage Alzheimer's dementia, presented to [facility] 11/10/23 s/p unwitnessed fall with head strike at home found by wife, found with multicompartmental ICH, s/p R craniectomy 11/10/23, course significant for Klebsiella pneumonia, hypernatremia, lethargy, LUE DVT, new anterior falcine SDH, downgraded to SDU 11/28  s/p R cranioplasty POD#8  Patient denies headaches at this time. following simple commands. Conversant with family.       MEDICATIONS  (STANDING):  apixaban 5 milliGRAM(s) Oral two times a day  aspirin enteric coated 81 milliGRAM(s) Oral daily  atorvastatin 80 milliGRAM(s) Oral at bedtime  cefTRIAXone Injectable. 1000 milliGRAM(s) IV Push every 24 hours  diltiazem    Tablet 60 milliGRAM(s) Oral <User Schedule>  doxazosin 2 milliGRAM(s) Oral at bedtime  folic acid 1 milliGRAM(s) Oral daily  levETIRAcetam  IVPB 500 milliGRAM(s) IV Intermittent every 12 hours  melatonin 5 milliGRAM(s) Oral at bedtime  memantine 5 milliGRAM(s) Oral two times a day  metoprolol tartrate 50 milliGRAM(s) Oral <User Schedule>  modafinil 100 milliGRAM(s) Oral <User Schedule>  Nephro-roma 1 Tablet(s) Oral daily  polyethylene glycol 3350 17 Gram(s) Oral daily  senna 2 Tablet(s) Oral at bedtime  sodium chloride 0.9% lock flush 3 milliLiter(s) IV Push every 8 hours  sodium chloride 0.9%. 1000 milliLiter(s) (75 mL/Hr) IV Continuous <Continuous>    MEDICATIONS  (PRN):  acetaminophen     Tablet .. 650 milliGRAM(s) Oral every 6 hours PRN Temp greater or equal to 38C (100.4F), Mild Pain (1 - 3)  artificial tears (preservative free) Ophthalmic Solution 1 Drop(s) Both EYES every 6 hours PRN Dry Eyes  hydrALAZINE Injectable 10 milliGRAM(s) IV Push every 2 hours PRN SBP > 160 mmHg  labetalol Injectable 10 milliGRAM(s) IV Push every 2 hours PRN Systolic blood pressure > 160  ondansetron Injectable 4 milliGRAM(s) IV Push every 6 hours PRN Nausea and/or Vomiting      Allergies  penicillin (Rash)  heparin (Other (Severe))  penicillin (Hives)  Cipro (Other)  Levaquin (Other)  heparin (Other)    Intolerances    Vital Signs Last 24 Hrs  T(C): 36.9 (15 Dec 2023 09:25), Max: 37.3 (14 Dec 2023 19:30)  T(F): 98.4 (15 Dec 2023 09:25), Max: 99.1 (14 Dec 2023 19:30)  HR: 61 (15 Dec 2023 08:00) (61 - 97)  BP: 112/56 (15 Dec 2023 08:00) (105/59 - 165/96)  BP(mean): 74 (15 Dec 2023 08:00) (74 - 115)  RR: 16 (15 Dec 2023 04:00) (16 - 20)  SpO2: 98% (15 Dec 2023 08:00) (96% - 99%)    Parameters below as of 15 Dec 2023 08:00  Patient On (Oxygen Delivery Method): room air     BMI (kg/m2): 25.1 (12-07-23 @ 15:09)      PHYSICAL EXAM  GENERAL: NAD,   HEAD:  Normocephalic, pos subgaleal collection with fullness. sutures intact.   EYES: EOMI, PERRLA, conjunctiva and sclera clear  ENMT: No tonsillar erythema, exudates, or enlargement; Moist mucous membranes, Good dentition, No lesions  NECK: Supple,   NERVOUS SYSTEM:  Alert & Oriented X3, Good concentration; Motor Strength 5/5 B/L upper and lower extremities; DTRs 2+ intact and symmetric  CHEST/LUNG: Clear  bilat  HEART: Regular rate and rhythm;   ABDOMEN: Soft, Nontender, Nondistended; Bowel sounds present  EXTREMITIES:  2+ Peripheral Pulses, No edema      LABS:                          7.7    6.66  )-----------( 139      ( 15 Dec 2023 07:12 )             23.6     12-15    138  |  106  |  12.5  ----------------------------<  102<H>  3.9   |  22.0  |  0.67    Ca    8.3<L>      15 Dec 2023 07:12  Phos  3.0     12-15  Mg     1.6     12-15      Urinalysis Basic - ( 15 Dec 2023 07:12 )    Color: x / Appearance: x / SG: x / pH: x  Gluc: 102 mg/dL / Ketone: x  / Bili: x / Urobili: x   Blood: x / Protein: x / Nitrite: x   Leuk Esterase: x / RBC: x / WBC x   Sq Epi: x / Non Sq Epi: x / Bacteria: x      I&O's Detail    14 Dec 2023 07:01  -  15 Dec 2023 07:00  --------------------------------------------------------  IN:    sodium chloride 0.9%: 825 mL  Total IN: 825 mL    OUT:    Voided (mL): 3150 mL  Total OUT: 3150 mL    Total NET: -2325 mL      RADIOLOGY & ADDITIONAL TESTS:  < from: CT Head No Cont (12.14.23 @ 10:47) >  ACC: [id number] EXAM:  CT BRAIN   PROCEDURE DATE:  12/14/2023    IMPRESSION: The patient is status post right frontal prosthetic   cranioplasty. Adjacent to the cranioplasty is extra-axial collection of   fluid, air, hemorrhage, smaller in size. Stable small right frontal   convexity subdural hypodense collection. Improved small hemorrhages in   the posterior right frontal lobe with adjacent hypodensity. Thin   posterior falx subdural hemorrhage tracks along the left tentorial   leaflet, unchanged. No new hemorrhage.  --- End of Report ---       INTERVAL HPI/OVERNIGHT EVENTS:  Pt post op day 11/10 right hemicraniectomy, Cranioplasty on 12/7     83M w/ PMH HTN, CAD s/p stents on ASA/Plavix, RCC s/p L nephrectomy, bladder CA, BPH, CHF, LBBB, vertigo, HLD, 5.5 cm AAA without rupture post EVAR, paroxysmal afib on Eliquis, early stage Alzheimer's dementia, presented to [facility] 11/10/23 s/p unwitnessed fall with head strike at home found by wife, found with multicompartmental ICH, s/p R craniectomy 11/10/23, course significant for Klebsiella pneumonia, hypernatremia, lethargy, LUE DVT, new anterior falcine SDH, downgraded to SDU 11/28  s/p R cranioplasty POD#8  Patient denies headaches at this time. following simple commands. Conversant with family.       MEDICATIONS  (STANDING):  apixaban 5 milliGRAM(s) Oral two times a day  aspirin enteric coated 81 milliGRAM(s) Oral daily  atorvastatin 80 milliGRAM(s) Oral at bedtime  cefTRIAXone Injectable. 1000 milliGRAM(s) IV Push every 24 hours  diltiazem    Tablet 60 milliGRAM(s) Oral <User Schedule>  doxazosin 2 milliGRAM(s) Oral at bedtime  folic acid 1 milliGRAM(s) Oral daily  levETIRAcetam  IVPB 500 milliGRAM(s) IV Intermittent every 12 hours  melatonin 5 milliGRAM(s) Oral at bedtime  memantine 5 milliGRAM(s) Oral two times a day  metoprolol tartrate 50 milliGRAM(s) Oral <User Schedule>  modafinil 100 milliGRAM(s) Oral <User Schedule>  Nephro-roma 1 Tablet(s) Oral daily  polyethylene glycol 3350 17 Gram(s) Oral daily  senna 2 Tablet(s) Oral at bedtime  sodium chloride 0.9% lock flush 3 milliLiter(s) IV Push every 8 hours  sodium chloride 0.9%. 1000 milliLiter(s) (75 mL/Hr) IV Continuous <Continuous>    MEDICATIONS  (PRN):  acetaminophen     Tablet .. 650 milliGRAM(s) Oral every 6 hours PRN Temp greater or equal to 38C (100.4F), Mild Pain (1 - 3)  artificial tears (preservative free) Ophthalmic Solution 1 Drop(s) Both EYES every 6 hours PRN Dry Eyes  hydrALAZINE Injectable 10 milliGRAM(s) IV Push every 2 hours PRN SBP > 160 mmHg  labetalol Injectable 10 milliGRAM(s) IV Push every 2 hours PRN Systolic blood pressure > 160  ondansetron Injectable 4 milliGRAM(s) IV Push every 6 hours PRN Nausea and/or Vomiting      Allergies  penicillin (Rash)  heparin (Other (Severe))  penicillin (Hives)  Cipro (Other)  Levaquin (Other)  heparin (Other)    Intolerances    Vital Signs Last 24 Hrs  T(C): 36.9 (15 Dec 2023 09:25), Max: 37.3 (14 Dec 2023 19:30)  T(F): 98.4 (15 Dec 2023 09:25), Max: 99.1 (14 Dec 2023 19:30)  HR: 61 (15 Dec 2023 08:00) (61 - 97)  BP: 112/56 (15 Dec 2023 08:00) (105/59 - 165/96)  BP(mean): 74 (15 Dec 2023 08:00) (74 - 115)  RR: 16 (15 Dec 2023 04:00) (16 - 20)  SpO2: 98% (15 Dec 2023 08:00) (96% - 99%)    Parameters below as of 15 Dec 2023 08:00  Patient On (Oxygen Delivery Method): room air     BMI (kg/m2): 25.1 (12-07-23 @ 15:09)      PHYSICAL EXAM  GENERAL: NAD,   HEAD:  Normocephalic, pos subgaleal collection with fullness. sutures intact.   EYES: EOMI, PERRLA, conjunctiva and sclera clear  ENMT: No tonsillar erythema, exudates, or enlargement; Moist mucous membranes, Good dentition, No lesions  NECK: Supple,   NERVOUS SYSTEM:  Alert & Oriented X3, Good concentration; Motor Strength 5/5 B/L upper and lower extremities; DTRs 2+ intact and symmetric  CHEST/LUNG: Clear  bilat  HEART: Regular rate and rhythm;   ABDOMEN: Soft, Nontender, Nondistended; Bowel sounds present  EXTREMITIES:  2+ Peripheral Pulses, No edema      LABS:                          7.7    6.66  )-----------( 139      ( 15 Dec 2023 07:12 )             23.6     12-15    138  |  106  |  12.5  ----------------------------<  102<H>  3.9   |  22.0  |  0.67    Ca    8.3<L>      15 Dec 2023 07:12  Phos  3.0     12-15  Mg     1.6     12-15      Urinalysis Basic - ( 15 Dec 2023 07:12 )    Color: x / Appearance: x / SG: x / pH: x  Gluc: 102 mg/dL / Ketone: x  / Bili: x / Urobili: x   Blood: x / Protein: x / Nitrite: x   Leuk Esterase: x / RBC: x / WBC x   Sq Epi: x / Non Sq Epi: x / Bacteria: x      I&O's Detail    14 Dec 2023 07:01  -  15 Dec 2023 07:00  --------------------------------------------------------  IN:    sodium chloride 0.9%: 825 mL  Total IN: 825 mL    OUT:    Voided (mL): 3150 mL  Total OUT: 3150 mL    Total NET: -2325 mL      RADIOLOGY & ADDITIONAL TESTS:  < from: CT Head No Cont (12.14.23 @ 10:47) >  ACC: [id number] EXAM:  CT BRAIN   PROCEDURE DATE:  12/14/2023    IMPRESSION: The patient is status post right frontal prosthetic   cranioplasty. Adjacent to the cranioplasty is extra-axial collection of   fluid, air, hemorrhage, smaller in size. Stable small right frontal   convexity subdural hypodense collection. Improved small hemorrhages in   the posterior right frontal lobe with adjacent hypodensity. Thin   posterior falx subdural hemorrhage tracks along the left tentorial   leaflet, unchanged. No new hemorrhage.  --- End of Report ---

## 2023-12-15 NOTE — PROGRESS NOTE ADULT - ASSESSMENT
82 y/o male with PMH of HTN, CAD s/p PCO, RCC s/p left nephrectomy, bladder Ca, BPH, CHF, Vertigo, HLD, AAA s/p EVAR, parox Afib, h/o HIT, Alzheimer transferred s/p fall with Rt frontal IPH. Initially had hemicraniectomy course complicated by RVR, aspiration PNA, hypoxic respiratory failure, LUE DVT and questionable increase in AAA size. Started on AC with waxing/waning mental status prompting head imaging with evidence of new bleed. Cardiology following. Neurology consulted after MRI showed stroke. Echo done concerning for mobile echodensity on aortic valve and strokes in multiple arterial territories; WHIT recommended but as noted documented but family declined. Patient was downgraded to medical floor and later upgraded to neuro ICU after cranioplasty; subgaleal drain was removed 12/10. Patient found to have low grade fever, tachycardic, blood culture sent, UA noted for pyuria. Now downgraded to medicine team 12/10    Pyuria   fever   -UA reviewed, no ucx sent   -bcx ngtd   -monitor temp, wbc   -cont rocephin 2 more days to complete course     IPH  -s/p decompressive R hemicraniotomy 11/10 and s/p R cranioplasty with replacement of bone flap 12/7  -additional repeat CTH 12/11 for change in neuro exam; stable   -s/p subgaleal drain removed 12/10   -CTH 12/14 s/p R frontal prosthetic cranioplasty, adjacent to cranioplasty is extra-axial collection of fluid, air, hemorrhage, smaller in size; stable small R frontal subdural hypodense collection; improved small hemorrhages in posterior R frontal lobe; no new hemorrhage   -neurosx following   -cont keppra bid   -repeat CTH pm 12/15    paroxysmal afib  HTN, HLD   CAD   -cont metoprolol, diltiazem BID   -cont statin   -cont prn labetalol, hydralazine   -d/w vascular sx team, cardiology, neurosx   -plan to resume asa, eliquis 1 week postop (started 12/14)  -f/u am cbc     Acute LUE DVT   -US LUE: stable partially occlusive L axillary vein thrombosis and L cephalic vein superficial thrombophlebitis; segmental occlusive thrombosis within 1 of 2 brachial veins AV fistula surrounded by thrombosis vs pseudoaneurysm   -Ac resumed as above   -avoid heparin products 2/2 hx of HIT   -BLE SCDs     Normocytic anemia , worsening   -cont to monitor   -f/u am cbc   -monitor for s/sx active bleeding     AAA s/p EVAR   -imaging reviewed by vascular sx   -no intervention planned at present   -antiplatelets not required for EVAR as per vascular     DONNIE resolved   urinary retention   -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 Initially had hemicraniectomy course complicated by RVR, aspiration PNA, hypoxic respiratory failure, LUE DVT and questionable increase in AAA size. Started on AC with waxing/waning mental status prompting head imaging with evidence of new bleed. Cardiology following. Neurology consulted after MRI showed stroke. Echo done concerning for mobile echodensity on aortic valve and strokes in multiple arterial territories; WHIT recommended but as noted documented but family declined. Patient was downgraded to medical floor and later upgraded to neuro ICU after cranioplasty; subgaleal drain was removed 12/10. Patient found to have low grade fever, tachycardic, blood culture sent, UA noted for pyuria. Now downgraded to medicine team 12/10    Pyuria   fever   -UA reviewed, no ucx sent   -bcx ngtd   -monitor temp, wbc   -cont rocephin 2 more days to complete course     IPH  -s/p decompressive R hemicraniotomy 11/10 and s/p R cranioplasty with replacement of bone flap 12/7  -additional repeat CTH 12/11 for change in neuro exam; stable   -s/p subgaleal drain removed 12/10   -CTH 12/14 s/p R frontal prosthetic cranioplasty, adjacent to cranioplasty is extra-axial collection of fluid, air, hemorrhage, smaller in size; stable small R frontal subdural hypodense collection; improved small hemorrhages in posterior R frontal lobe; no new hemorrhage   -neurosx following   -cont keppra bid   -repeat CTH pm 12/15    paroxysmal afib  HTN, HLD   CAD   -cont metoprolol, diltiazem BID   -cont statin   -cont prn labetalol, hydralazine   -d/w vascular sx team, cardiology, neurosx   -plan to resume asa, eliquis 1 week postop (started 12/14)  -f/u am cbc     Acute LUE DVT   -US LUE: stable partially occlusive L axillary vein thrombosis and L cephalic vein superficial thrombophlebitis; segmental occlusive thrombosis within 1 of 2 brachial veins AV fistula surrounded by thrombosis vs pseudoaneurysm   -Ac resumed as above   -avoid heparin products 2/2 hx of HIT   -BLE SCDs     Normocytic anemia , worsening   -cont to monitor   -f/u am cbc   -monitor for s/sx active bleeding     AAA s/p EVAR   -imaging reviewed by vascular sx   -no intervention planned at present   -antiplatelets not required for EVAR as per vascular     DONNIE resolved   urinary retention   -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

## 2023-12-15 NOTE — CHART NOTE - NSCHARTNOTEFT_GEN_A_CORE
I have evaluated this patient and have determined that restraints are warranted to optimize medical care. Patient was assessed for current physical and psychological risk factors as well as special needs. There are no medical conditions or limitations that would place this patient at risk while in restraints. Dr. Fletcher made aware.

## 2023-12-15 NOTE — PROGRESS NOTE ADULT - SUBJECTIVE AND OBJECTIVE BOX
FUNCTIONAL PROGRESS  12/11 SLP  Speech Language Pathology Recommendations: 1. puree diet w/ moderately thick liquids 2. 1:1 assist 3. STRICT aspiration precautions 4. Pt must be awake/alert & upright for PO5. slow rate, small bites/sips, alternate solids/liquids6. Oral care7. SLP to follow     12/8 PT  Bed Mobility Analysis:     · Bed Mobility Limitations	decreased ability to use arms for pushing/pulling; decreased ability to use legs for bridging/pushing  · Impairments Contributing to Impaired Bed Mobility	impaired balance; decreased ROM; decreased strength; impaired coordination; impaired postural control    Transfer: Sit to Stand:     · Level of [place]	maximum assist (25% patients effort)  · Physical Assist/Nonphysical Assist	2 person assist    Transfer: Stand to Sit:     · Level of [place]	maximum assist (25% patients effort)  · Physical Assist/Nonphysical Assist	2 person assist    Sit/Stand Transfer Safety Analysis:     · Impairments Contributing to Impaired Transfers	impaired balance; impaired coordination; impaired postural control; decreased ROM; decreased strength    Gait Skills:     · Level of [place]	maximum assist (25% patients effort)  · Gait Distance	bed to chair    12/8 OT  Bathing Training:     · Level of [place]	dependent (less than 25% patients effort)    Upper Body Dressing Training:     · Level of [place]	dependent (less than 25% patients effort); to don gown    Lower Body Dressing Training:     · Level of [place]	dependent (less than 25% patients effort); to don socks    Toilet Hygiene Training:     · Level of [place]	dependent (less than 25% patients effort); secondary to hall    Grooming Training:     · Level of [place]	maximum assist (25% patients effort)  · Physical Assist/Nonphysical Assist	1 person assist    Eating/Self-Feeding Training:     · Level of [place]	minimum assist (75% patients effort)  · Physical Assist/Nonphysical Assist	1 person assist          VITALS  T(C): 36.8 (12-15-23 @ 04:18), Max: 37.3 (12-14-23 @ 19:30)  HR: 61 (12-15-23 @ 08:00) (61 - 97)  BP: 112/56 (12-15-23 @ 08:00) (105/59 - 165/96)  RR: 16 (12-15-23 @ 04:00) (16 - 20)  SpO2: 98% (12-15-23 @ 08:00) (96% - 99%)  Wt(kg): --    MEDICATIONS   acetaminophen     Tablet .. 650 milliGRAM(s) every 6 hours PRN  apixaban 5 milliGRAM(s) two times a day  artificial tears (preservative free) Ophthalmic Solution 1 Drop(s) every 6 hours PRN  aspirin enteric coated 81 milliGRAM(s) daily  atorvastatin 80 milliGRAM(s) at bedtime  cefTRIAXone Injectable. 1000 milliGRAM(s) every 24 hours  diltiazem    Tablet 60 milliGRAM(s) <User Schedule>  doxazosin 2 milliGRAM(s) at bedtime  folic acid 1 milliGRAM(s) daily  hydrALAZINE Injectable 10 milliGRAM(s) every 2 hours PRN  labetalol Injectable 10 milliGRAM(s) every 2 hours PRN  levETIRAcetam  IVPB 500 milliGRAM(s) every 12 hours  melatonin 5 milliGRAM(s) at bedtime  memantine 5 milliGRAM(s) two times a day  metoprolol tartrate 50 milliGRAM(s) <User Schedule>  Nephro-roma 1 Tablet(s) daily  ondansetron Injectable 4 milliGRAM(s) every 6 hours PRN  polyethylene glycol 3350 17 Gram(s) daily  senna 2 Tablet(s) at bedtime  sodium chloride 0.9% lock flush 3 milliLiter(s) every 8 hours  sodium chloride 0.9%. 1000 milliLiter(s) <Continuous>      RECENT LABS/IMAGING  - Reviewed Today                        7.7    6.66  )-----------( 139      ( 15 Dec 2023 07:12 )             23.6     12-15    138  |  106  |  12.5  ----------------------------<  102<H>  3.9   |  22.0  |  0.67    Ca    8.3<L>      15 Dec 2023 07:12  Phos  3.0     12-15  Mg     1.6     12-15        Urinalysis Basic - ( 15 Dec 2023 07:12 )    Color: x / Appearance: x / SG: x / pH: x  Gluc: 102 mg/dL / Ketone: x  / Bili: x / Urobili: x   Blood: x / Protein: x / Nitrite: x   Leuk Esterase: x / RBC: x / WBC x   Sq Epi: x / Non Sq Epi: x / Bacteria: x      MR Brain 11/13 - RIGHT frontoparietal craniectomy with underlying intracranial hemorrhage and edema within the RIGHT frontal lobe. Overlying small RIGHT subdural hemorrhage is also unchanged. Scant hemorrhage in the dependent portions of the BILATERAL lateral ventricles and minimal subarachnoid hemorrhage seen in the BILATERAL frontal and parietal lobes. Moderate periventricular and deep white matter ischemia is noted. Encephalomalacia and gliosis seen in the anterior frontal lobes bilaterally. Tiny acute lacunar infarction in the LEFT cerebellum and tiny acute cortical infarction in the RIGHT parietal lobe. Restricted diffusion also noted about the surgical cavity suggesting infarcted parenchyma.    CXR 11/15 - There is mild pulmonary venous congestion. Left retrocardiac/lower lobe opacity could represent associated pneumonia.    HEAD CT 11/16 - Improved to stable multicompartment intracranial hemorrhages. Right-sided subdural hygroma, larger in size.    US V DOPPLER BLE 11/16 - No evidence of deep venous thrombosis in either lower extremity.    HEAD CT 11/22 - Mixed attenuating subdural hematoma again seen with some expected postoperative changes. Evolving area of parenchymal hemorrhage involving the right frontal region.    HEAD CT 11/23 - Stable multicompartment intracranial hemorrhages. Stable right-sided subdural collection.    HEAD CT 11/29 - Stable intracranial hemorrhages.    HEAD CT 12/2  Right hemicraniectomy. Right subdural fluid attenuation collection as seen on the prior. Decreased conspicuity to regions of acute hemorrhage in the right high frontal parasagittal region as well as stable appearance to hemorrhage in the right insular posterior frontal region in association with lucency as seen on the prior study    HEAD CT 12/5 - Unchanged evolving hemorrhage within the RIGHT frontal lobe, medially and laterally with associated edema. RIGHT frontoparietal hemicraniectomy is again noted with unchanged extra-axial. Mild to moderate periventricular white matter ischemia noted.    HEAD CT 12/11 - No change since 12/9/2023.    BLE V DOPPLER 12/12 - No evidence of deep venous thrombosis in either lower extremity.    HEAD CT 12/14 -  The patient is status post right frontal prosthetic cranioplasty. Adjacent to the cranioplasty is extra-axial collection of fluid, air, hemorrhage, smaller in size. Stable small right frontal convexity subdural hypodense collection. Improved small hemorrhages in the posterior right frontal lobe with adjacent hypodensity. Thin posterior falx subdural hemorrhage tracks along the left tentorial leaflet, unchanged. No new hemorrhage.  ----------------------------------------------------------------------------------------  PHYSICAL EXAM  Constitutional - NAD, Comfortable   HEENT - Right craniotomy - +Dressing  Extremities - No peripheral edema   Neurologic Exam -                    Cognitive - Awake/Alert     Communication - Hypophonic, Delayed processing     Motor - L EF 1/5, L EE 1/5   Psych - Awake   ----------------------------------------------------------------------------------------  ASSESSMENT/PLAN  83yMale with functional deficits after a fall sustaining multicompartment intracranial bleeding  [facility] - Discontinued Provigil 100mg Q8AM (12/14)  Seizure PPX - Keppra  Alzheimer's Dementia - Namenda 5mg BID, Seroquel PRN   Pyuria - Rocephin   CAD, PAFIB - Cardizem, Lopressor, Lipitor, Hydralazine, Labetalol  HTN - Hydralazine, labetalol   Sleep - Melatonin    Oropharyngeal Dysphagia - Puree & MOD THICK Liquids  Pain - Tylenol, Oxycodone  DVT PPX - SCD, apixaban   Rehab/Impaired mobility and function - Continuous hospitalization is crucial for managing the patient's acute medical issues (intracranial bleed, AMS, hemiplegia), which have significantly impacted their mobility, quality of life, and function. Rehabilitation recommendations will be based on the patient's functional progress and their ability to participate in and tolerate therapeutic interventions, which may change over time. Maintaining ongoing mobilization by the staff is imperative to prevent secondary medical complications and associated health issues related to debility.    Given patient's medical diagnosis, functional status, and potential for progress, he is likely a good candidate for [facility] placement. He currently DOES NOT meet the criteria for acute inpatient rehabilitation and would not tolerate 3h of intensive PT/OT/SLP daily. Discharge recommendations are subject to change and PM&R team will continue to follow to monitor progress while in the hospital.              FUNCTIONAL PROGRESS  12/11 SLP  Speech Language Pathology Recommendations: 1. puree diet w/ moderately thick liquids 2. 1:1 assist 3. STRICT aspiration precautions 4. Pt must be awake/alert & upright for PO5. slow rate, small bites/sips, alternate solids/liquids6. Oral care7. SLP to follow     12/8 PT  Bed Mobility Analysis:     · Bed Mobility Limitations	decreased ability to use arms for pushing/pulling; decreased ability to use legs for bridging/pushing  · Impairments Contributing to Impaired Bed Mobility	impaired balance; decreased ROM; decreased strength; impaired coordination; impaired postural control    Transfer: Sit to Stand:     · Level of [place]	maximum assist (25% patients effort)  · Physical Assist/Nonphysical Assist	2 person assist    Transfer: Stand to Sit:     · Level of [place]	maximum assist (25% patients effort)  · Physical Assist/Nonphysical Assist	2 person assist    Sit/Stand Transfer Safety Analysis:     · Impairments Contributing to Impaired Transfers	impaired balance; impaired coordination; impaired postural control; decreased ROM; decreased strength    Gait Skills:     · Level of [place]	maximum assist (25% patients effort)  · Gait Distance	bed to chair    12/8 OT  Bathing Training:     · Level of [place]	dependent (less than 25% patients effort)    Upper Body Dressing Training:     · Level of [place]	dependent (less than 25% patients effort); to don gown    Lower Body Dressing Training:     · Level of [place]	dependent (less than 25% patients effort); to don socks    Toilet Hygiene Training:     · Level of [place]	dependent (less than 25% patients effort); secondary to hall    Grooming Training:     · Level of [place]	maximum assist (25% patients effort)  · Physical Assist/Nonphysical Assist	1 person assist    Eating/Self-Feeding Training:     · Level of [place]	minimum assist (75% patients effort)  · Physical Assist/Nonphysical Assist	1 person assist          VITALS  T(C): 36.8 (12-15-23 @ 04:18), Max: 37.3 (12-14-23 @ 19:30)  HR: 61 (12-15-23 @ 08:00) (61 - 97)  BP: 112/56 (12-15-23 @ 08:00) (105/59 - 165/96)  RR: 16 (12-15-23 @ 04:00) (16 - 20)  SpO2: 98% (12-15-23 @ 08:00) (96% - 99%)  Wt(kg): --    MEDICATIONS   acetaminophen     Tablet .. 650 milliGRAM(s) every 6 hours PRN  apixaban 5 milliGRAM(s) two times a day  artificial tears (preservative free) Ophthalmic Solution 1 Drop(s) every 6 hours PRN  aspirin enteric coated 81 milliGRAM(s) daily  atorvastatin 80 milliGRAM(s) at bedtime  cefTRIAXone Injectable. 1000 milliGRAM(s) every 24 hours  diltiazem    Tablet 60 milliGRAM(s) <User Schedule>  doxazosin 2 milliGRAM(s) at bedtime  folic acid 1 milliGRAM(s) daily  hydrALAZINE Injectable 10 milliGRAM(s) every 2 hours PRN  labetalol Injectable 10 milliGRAM(s) every 2 hours PRN  levETIRAcetam  IVPB 500 milliGRAM(s) every 12 hours  melatonin 5 milliGRAM(s) at bedtime  memantine 5 milliGRAM(s) two times a day  metoprolol tartrate 50 milliGRAM(s) <User Schedule>  Nephro-roma 1 Tablet(s) daily  ondansetron Injectable 4 milliGRAM(s) every 6 hours PRN  polyethylene glycol 3350 17 Gram(s) daily  senna 2 Tablet(s) at bedtime  sodium chloride 0.9% lock flush 3 milliLiter(s) every 8 hours  sodium chloride 0.9%. 1000 milliLiter(s) <Continuous>      RECENT LABS/IMAGING  - Reviewed Today                        7.7    6.66  )-----------( 139      ( 15 Dec 2023 07:12 )             23.6     12-15    138  |  106  |  12.5  ----------------------------<  102<H>  3.9   |  22.0  |  0.67    Ca    8.3<L>      15 Dec 2023 07:12  Phos  3.0     12-15  Mg     1.6     12-15        Urinalysis Basic - ( 15 Dec 2023 07:12 )    Color: x / Appearance: x / SG: x / pH: x  Gluc: 102 mg/dL / Ketone: x  / Bili: x / Urobili: x   Blood: x / Protein: x / Nitrite: x   Leuk Esterase: x / RBC: x / WBC x   Sq Epi: x / Non Sq Epi: x / Bacteria: x      MR Brain 11/13 - RIGHT frontoparietal craniectomy with underlying intracranial hemorrhage and edema within the RIGHT frontal lobe. Overlying small RIGHT subdural hemorrhage is also unchanged. Scant hemorrhage in the dependent portions of the BILATERAL lateral ventricles and minimal subarachnoid hemorrhage seen in the BILATERAL frontal and parietal lobes. Moderate periventricular and deep white matter ischemia is noted. Encephalomalacia and gliosis seen in the anterior frontal lobes bilaterally. Tiny acute lacunar infarction in the LEFT cerebellum and tiny acute cortical infarction in the RIGHT parietal lobe. Restricted diffusion also noted about the surgical cavity suggesting infarcted parenchyma.    CXR 11/15 - There is mild pulmonary venous congestion. Left retrocardiac/lower lobe opacity could represent associated pneumonia.    HEAD CT 11/16 - Improved to stable multicompartment intracranial hemorrhages. Right-sided subdural hygroma, larger in size.    US V DOPPLER BLE 11/16 - No evidence of deep venous thrombosis in either lower extremity.    HEAD CT 11/22 - Mixed attenuating subdural hematoma again seen with some expected postoperative changes. Evolving area of parenchymal hemorrhage involving the right frontal region.    HEAD CT 11/23 - Stable multicompartment intracranial hemorrhages. Stable right-sided subdural collection.    HEAD CT 11/29 - Stable intracranial hemorrhages.    HEAD CT 12/2  Right hemicraniectomy. Right subdural fluid attenuation collection as seen on the prior. Decreased conspicuity to regions of acute hemorrhage in the right high frontal parasagittal region as well as stable appearance to hemorrhage in the right insular posterior frontal region in association with lucency as seen on the prior study    HEAD CT 12/5 - Unchanged evolving hemorrhage within the RIGHT frontal lobe, medially and laterally with associated edema. RIGHT frontoparietal hemicraniectomy is again noted with unchanged extra-axial. Mild to moderate periventricular white matter ischemia noted.    HEAD CT 12/11 - No change since 12/9/2023.    BLE V DOPPLER 12/12 - No evidence of deep venous thrombosis in either lower extremity.    HEAD CT 12/14 -  The patient is status post right frontal prosthetic cranioplasty. Adjacent to the cranioplasty is extra-axial collection of fluid, air, hemorrhage, smaller in size. Stable small right frontal convexity subdural hypodense collection. Improved small hemorrhages in the posterior right frontal lobe with adjacent hypodensity. Thin posterior falx subdural hemorrhage tracks along the left tentorial leaflet, unchanged. No new hemorrhage.  ----------------------------------------------------------------------------------------  PHYSICAL EXAM  Constitutional - NAD, Comfortable   HEENT - Right craniotomy - +Dressing  Extremities - No peripheral edema   Neurologic Exam -                    Cognitive - Awake/Alert     Communication - Hypophonic, Delayed processing     Motor - L EF 1/5, L EE 1/5   Psych - Awake   ----------------------------------------------------------------------------------------  ASSESSMENT/PLAN  83yMale with functional deficits after a fall sustaining multicompartment intracranial bleeding  [facility] - Discontinued Provigil 100mg Q8AM (12/14)  Seizure PPX - Keppra  Alzheimer's Dementia - Namenda 5mg BID, Seroquel PRN   Pyuria - Rocephin   CAD, PAFIB - Cardizem, Lopressor, Lipitor, Hydralazine, Labetalol  HTN - Hydralazine, labetalol   Sleep - Melatonin    Oropharyngeal Dysphagia - Puree & MOD THICK Liquids  Pain - Tylenol, Oxycodone  DVT PPX - SCD, apixaban   Rehab/Impaired mobility and function - Continuous hospitalization is crucial for managing the patient's acute medical issues (intracranial bleed, AMS, hemiplegia), which have significantly impacted their mobility, quality of life, and function. Rehabilitation recommendations will be based on the patient's functional progress and their ability to participate in and tolerate therapeutic interventions, which may change over time. Maintaining ongoing mobilization by the staff is imperative to prevent secondary medical complications and associated health issues related to debility.    Given patient's medical diagnosis, functional status, and potential for progress, he is likely a good candidate for [facility] placement. He currently DOES NOT meet the criteria for acute inpatient rehabilitation and would not tolerate 3h of intensive PT/OT/SLP daily. Discharge recommendations are subject to change and PM&R team will continue to follow to monitor progress while in the hospital.          More tired   Intermittently following commands   Increased spasticity of the LUE   VSS, Afebrile     FUNCTIONAL PROGRESS  12/11 SLP  Speech Language Pathology Recommendations: 1. puree diet w/ moderately thick liquids 2. 1:1 assist 3. STRICT aspiration precautions 4. Pt must be awake/alert & upright for PO5. slow rate, small bites/sips, alternate solids/liquids6. Oral care7. SLP to follow     12/8 PT  Bed Mobility Analysis:     · Bed Mobility Limitations	decreased ability to use arms for pushing/pulling; decreased ability to use legs for bridging/pushing  · Impairments Contributing to Impaired Bed Mobility	impaired balance; decreased ROM; decreased strength; impaired coordination; impaired postural control    Transfer: Sit to Stand:     · Level of [place]	maximum assist (25% patients effort)  · Physical Assist/Nonphysical Assist	2 person assist    Transfer: Stand to Sit:     · Level of [place]	maximum assist (25% patients effort)  · Physical Assist/Nonphysical Assist	2 person assist    Sit/Stand Transfer Safety Analysis:     · Impairments Contributing to Impaired Transfers	impaired balance; impaired coordination; impaired postural control; decreased ROM; decreased strength    Gait Skills:     · Level of [place]	maximum assist (25% patients effort)  · Gait Distance	bed to chair    12/8 OT  Bathing Training:     · Level of [place]	dependent (less than 25% patients effort)    Upper Body Dressing Training:     · Level of [place]	dependent (less than 25% patients effort); to don gown    Lower Body Dressing Training:     · Level of [place]	dependent (less than 25% patients effort); to don socks    Toilet Hygiene Training:     · Level of [place]	dependent (less than 25% patients effort); secondary to hall    Grooming Training:     · Level of [place]	maximum assist (25% patients effort)  · Physical Assist/Nonphysical Assist	1 person assist    Eating/Self-Feeding Training:     · Level of [place]	minimum assist (75% patients effort)  · Physical Assist/Nonphysical Assist	1 person assist          VITALS  T(C): 36.8 (12-15-23 @ 04:18), Max: 37.3 (12-14-23 @ 19:30)  HR: 61 (12-15-23 @ 08:00) (61 - 97)  BP: 112/56 (12-15-23 @ 08:00) (105/59 - 165/96)  RR: 16 (12-15-23 @ 04:00) (16 - 20)  SpO2: 98% (12-15-23 @ 08:00) (96% - 99%)  Wt(kg): --    MEDICATIONS   acetaminophen     Tablet .. 650 milliGRAM(s) every 6 hours PRN  apixaban 5 milliGRAM(s) two times a day  artificial tears (preservative free) Ophthalmic Solution 1 Drop(s) every 6 hours PRN  aspirin enteric coated 81 milliGRAM(s) daily  atorvastatin 80 milliGRAM(s) at bedtime  cefTRIAXone Injectable. 1000 milliGRAM(s) every 24 hours  diltiazem    Tablet 60 milliGRAM(s) <User Schedule>  doxazosin 2 milliGRAM(s) at bedtime  folic acid 1 milliGRAM(s) daily  hydrALAZINE Injectable 10 milliGRAM(s) every 2 hours PRN  labetalol Injectable 10 milliGRAM(s) every 2 hours PRN  levETIRAcetam  IVPB 500 milliGRAM(s) every 12 hours  melatonin 5 milliGRAM(s) at bedtime  memantine 5 milliGRAM(s) two times a day  metoprolol tartrate 50 milliGRAM(s) <User Schedule>  Nephro-roma 1 Tablet(s) daily  ondansetron Injectable 4 milliGRAM(s) every 6 hours PRN  polyethylene glycol 3350 17 Gram(s) daily  senna 2 Tablet(s) at bedtime  sodium chloride 0.9% lock flush 3 milliLiter(s) every 8 hours  sodium chloride 0.9%. 1000 milliLiter(s) <Continuous>      RECENT LABS/IMAGING  - Reviewed Today                        7.7    6.66  )-----------( 139      ( 15 Dec 2023 07:12 )             23.6     12-15    138  |  106  |  12.5  ----------------------------<  102<H>  3.9   |  22.0  |  0.67    Ca    8.3<L>      15 Dec 2023 07:12  Phos  3.0     12-15  Mg     1.6     12-15        Urinalysis Basic - ( 15 Dec 2023 07:12 )    Color: x / Appearance: x / SG: x / pH: x  Gluc: 102 mg/dL / Ketone: x  / Bili: x / Urobili: x   Blood: x / Protein: x / Nitrite: x   Leuk Esterase: x / RBC: x / WBC x   Sq Epi: x / Non Sq Epi: x / Bacteria: x      MR Brain 11/13 - RIGHT frontoparietal craniectomy with underlying intracranial hemorrhage and edema within the RIGHT frontal lobe. Overlying small RIGHT subdural hemorrhage is also unchanged. Scant hemorrhage in the dependent portions of the BILATERAL lateral ventricles and minimal subarachnoid hemorrhage seen in the BILATERAL frontal and parietal lobes. Moderate periventricular and deep white matter ischemia is noted. Encephalomalacia and gliosis seen in the anterior frontal lobes bilaterally. Tiny acute lacunar infarction in the LEFT cerebellum and tiny acute cortical infarction in the RIGHT parietal lobe. Restricted diffusion also noted about the surgical cavity suggesting infarcted parenchyma.    CXR 11/15 - There is mild pulmonary venous congestion. Left retrocardiac/lower lobe opacity could represent associated pneumonia.    HEAD CT 11/16 - Improved to stable multicompartment intracranial hemorrhages. Right-sided subdural hygroma, larger in size.    US V DOPPLER BLE 11/16 - No evidence of deep venous thrombosis in either lower extremity.    HEAD CT 11/22 - Mixed attenuating subdural hematoma again seen with some expected postoperative changes. Evolving area of parenchymal hemorrhage involving the right frontal region.    HEAD CT 11/23 - Stable multicompartment intracranial hemorrhages. Stable right-sided subdural collection.    HEAD CT 11/29 - Stable intracranial hemorrhages.    HEAD CT 12/2  Right hemicraniectomy. Right subdural fluid attenuation collection as seen on the prior. Decreased conspicuity to regions of acute hemorrhage in the right high frontal parasagittal region as well as stable appearance to hemorrhage in the right insular posterior frontal region in association with lucency as seen on the prior study    HEAD CT 12/5 - Unchanged evolving hemorrhage within the RIGHT frontal lobe, medially and laterally with associated edema. RIGHT frontoparietal hemicraniectomy is again noted with unchanged extra-axial. Mild to moderate periventricular white matter ischemia noted.    HEAD CT 12/11 - No change since 12/9/2023.    BLE V DOPPLER 12/12 - No evidence of deep venous thrombosis in either lower extremity.    HEAD CT 12/14 -  The patient is status post right frontal prosthetic cranioplasty. Adjacent to the cranioplasty is extra-axial collection of fluid, air, hemorrhage, smaller in size. Stable small right frontal convexity subdural hypodense collection. Improved small hemorrhages in the posterior right frontal lobe with adjacent hypodensity. Thin posterior falx subdural hemorrhage tracks along the left tentorial leaflet, unchanged. No new hemorrhage.  ----------------------------------------------------------------------------------------  PHYSICAL EXAM  Constitutional - NAD, Comfortable   HEENT - Right craniotomy - +Dressing  Extremities - No peripheral edema   Neurologic Exam -                    Cognitive - Awake/Alert     Communication - Hypophonic, Delayed processing     Motor - L EF 1/5, L EE 1/5   Psych - Awake   ----------------------------------------------------------------------------------------  ASSESSMENT/PLAN  83yMale with functional deficits after a fall sustaining multicompartment intracranial bleeding  [facility] - Provigil 100mg Q8AM (12/14)  Seizure PPX - Keppra  Alzheimer's Dementia - Namenda 5mg BID  Pyuria - Rocephin   CAD, PAFIB - Cardizem, Lopressor, Lipitor, Hydralazine, Labetalol  HTN - Hydralazine, labetalol   Sleep - Melatonin    Oropharyngeal Dysphagia - Puree & MOD THICK Liquids  Pain - Tylenol   DVT PPX/LUE DVT - SCD, apixaban   Rehab/Impaired mobility and function - Continuous hospitalization is crucial for managing the patient's acute medical issues (intracranial bleed, AMS, hemiplegia), which have significantly impacted their mobility, quality of life, and function. Rehabilitation recommendations will be based on the patient's functional progress and their ability to participate in and tolerate therapeutic interventions, which may change over time. Maintaining ongoing mobilization by the staff is imperative to prevent secondary medical complications and associated health issues related to debility.    Given patient's medical diagnosis, functional status, and potential for progress, he is likely a good candidate for [facility] placement. He currently DOES NOT meet the criteria for acute inpatient rehabilitation and would not tolerate 3h of intensive PT/OT/SLP daily. Discharge recommendations are subject to change and PM&R team will continue to follow to monitor progress while in the hospital.     Patient with limited wakefulness this AM. Recommend restarting Provigil 100mg 0800 (already ordered). Recommend OT for splint evaluation and left sided resting hand splint to be worn throughout the day (will place orders).          More tired   Intermittently following commands   Increased spasticity of the LUE   VSS, Afebrile     FUNCTIONAL PROGRESS  12/11 SLP  Speech Language Pathology Recommendations: 1. puree diet w/ moderately thick liquids 2. 1:1 assist 3. STRICT aspiration precautions 4. Pt must be awake/alert & upright for PO5. slow rate, small bites/sips, alternate solids/liquids6. Oral care7. SLP to follow     12/8 PT  Bed Mobility Analysis:     · Bed Mobility Limitations	decreased ability to use arms for pushing/pulling; decreased ability to use legs for bridging/pushing  · Impairments Contributing to Impaired Bed Mobility	impaired balance; decreased ROM; decreased strength; impaired coordination; impaired postural control    Transfer: Sit to Stand:     · Level of [place]	maximum assist (25% patients effort)  · Physical Assist/Nonphysical Assist	2 person assist    Transfer: Stand to Sit:     · Level of [place]	maximum assist (25% patients effort)  · Physical Assist/Nonphysical Assist	2 person assist    Sit/Stand Transfer Safety Analysis:     · Impairments Contributing to Impaired Transfers	impaired balance; impaired coordination; impaired postural control; decreased ROM; decreased strength    Gait Skills:     · Level of [place]	maximum assist (25% patients effort)  · Gait Distance	bed to chair    12/8 OT  Bathing Training:     · Level of [place]	dependent (less than 25% patients effort)    Upper Body Dressing Training:     · Level of [place]	dependent (less than 25% patients effort); to don gown    Lower Body Dressing Training:     · Level of [place]	dependent (less than 25% patients effort); to don socks    Toilet Hygiene Training:     · Level of [place]	dependent (less than 25% patients effort); secondary to hall    Grooming Training:     · Level of [place]	maximum assist (25% patients effort)  · Physical Assist/Nonphysical Assist	1 person assist    Eating/Self-Feeding Training:     · Level of [place]	minimum assist (75% patients effort)  · Physical Assist/Nonphysical Assist	1 person assist          VITALS  T(C): 36.8 (12-15-23 @ 04:18), Max: 37.3 (12-14-23 @ 19:30)  HR: 61 (12-15-23 @ 08:00) (61 - 97)  BP: 112/56 (12-15-23 @ 08:00) (105/59 - 165/96)  RR: 16 (12-15-23 @ 04:00) (16 - 20)  SpO2: 98% (12-15-23 @ 08:00) (96% - 99%)  Wt(kg): --    MEDICATIONS   acetaminophen     Tablet .. 650 milliGRAM(s) every 6 hours PRN  apixaban 5 milliGRAM(s) two times a day  artificial tears (preservative free) Ophthalmic Solution 1 Drop(s) every 6 hours PRN  aspirin enteric coated 81 milliGRAM(s) daily  atorvastatin 80 milliGRAM(s) at bedtime  cefTRIAXone Injectable. 1000 milliGRAM(s) every 24 hours  diltiazem    Tablet 60 milliGRAM(s) <User Schedule>  doxazosin 2 milliGRAM(s) at bedtime  folic acid 1 milliGRAM(s) daily  hydrALAZINE Injectable 10 milliGRAM(s) every 2 hours PRN  labetalol Injectable 10 milliGRAM(s) every 2 hours PRN  levETIRAcetam  IVPB 500 milliGRAM(s) every 12 hours  melatonin 5 milliGRAM(s) at bedtime  memantine 5 milliGRAM(s) two times a day  metoprolol tartrate 50 milliGRAM(s) <User Schedule>  Nephro-roma 1 Tablet(s) daily  ondansetron Injectable 4 milliGRAM(s) every 6 hours PRN  polyethylene glycol 3350 17 Gram(s) daily  senna 2 Tablet(s) at bedtime  sodium chloride 0.9% lock flush 3 milliLiter(s) every 8 hours  sodium chloride 0.9%. 1000 milliLiter(s) <Continuous>      RECENT LABS/IMAGING  - Reviewed Today                        7.7    6.66  )-----------( 139      ( 15 Dec 2023 07:12 )             23.6     12-15    138  |  106  |  12.5  ----------------------------<  102<H>  3.9   |  22.0  |  0.67    Ca    8.3<L>      15 Dec 2023 07:12  Phos  3.0     12-15  Mg     1.6     12-15        Urinalysis Basic - ( 15 Dec 2023 07:12 )    Color: x / Appearance: x / SG: x / pH: x  Gluc: 102 mg/dL / Ketone: x  / Bili: x / Urobili: x   Blood: x / Protein: x / Nitrite: x   Leuk Esterase: x / RBC: x / WBC x   Sq Epi: x / Non Sq Epi: x / Bacteria: x      MR Brain 11/13 - RIGHT frontoparietal craniectomy with underlying intracranial hemorrhage and edema within the RIGHT frontal lobe. Overlying small RIGHT subdural hemorrhage is also unchanged. Scant hemorrhage in the dependent portions of the BILATERAL lateral ventricles and minimal subarachnoid hemorrhage seen in the BILATERAL frontal and parietal lobes. Moderate periventricular and deep white matter ischemia is noted. Encephalomalacia and gliosis seen in the anterior frontal lobes bilaterally. Tiny acute lacunar infarction in the LEFT cerebellum and tiny acute cortical infarction in the RIGHT parietal lobe. Restricted diffusion also noted about the surgical cavity suggesting infarcted parenchyma.    CXR 11/15 - There is mild pulmonary venous congestion. Left retrocardiac/lower lobe opacity could represent associated pneumonia.    HEAD CT 11/16 - Improved to stable multicompartment intracranial hemorrhages. Right-sided subdural hygroma, larger in size.    US V DOPPLER BLE 11/16 - No evidence of deep venous thrombosis in either lower extremity.    HEAD CT 11/22 - Mixed attenuating subdural hematoma again seen with some expected postoperative changes. Evolving area of parenchymal hemorrhage involving the right frontal region.    HEAD CT 11/23 - Stable multicompartment intracranial hemorrhages. Stable right-sided subdural collection.    HEAD CT 11/29 - Stable intracranial hemorrhages.    HEAD CT 12/2  Right hemicraniectomy. Right subdural fluid attenuation collection as seen on the prior. Decreased conspicuity to regions of acute hemorrhage in the right high frontal parasagittal region as well as stable appearance to hemorrhage in the right insular posterior frontal region in association with lucency as seen on the prior study    HEAD CT 12/5 - Unchanged evolving hemorrhage within the RIGHT frontal lobe, medially and laterally with associated edema. RIGHT frontoparietal hemicraniectomy is again noted with unchanged extra-axial. Mild to moderate periventricular white matter ischemia noted.    HEAD CT 12/11 - No change since 12/9/2023.    BLE V DOPPLER 12/12 - No evidence of deep venous thrombosis in either lower extremity.    HEAD CT 12/14 -  The patient is status post right frontal prosthetic cranioplasty. Adjacent to the cranioplasty is extra-axial collection of fluid, air, hemorrhage, smaller in size. Stable small right frontal convexity subdural hypodense collection. Improved small hemorrhages in the posterior right frontal lobe with adjacent hypodensity. Thin posterior falx subdural hemorrhage tracks along the left tentorial leaflet, unchanged. No new hemorrhage.  ----------------------------------------------------------------------------------------  PHYSICAL EXAM  Constitutional - NAD, Comfortable   HEENT - Right craniotomy - +Dressing  Extremities - No peripheral edema   Neurologic Exam -                    Cognitive - Awake/Alert     Communication - Hypophonic, Delayed processing     Motor - L EF 1/5, L EE 1/5   Psych - Awake   ----------------------------------------------------------------------------------------  ASSESSMENT/PLAN  83yMale with functional deficits after a fall sustaining multicompartment intracranial bleeding  Wakefulness - Provigil 100mg Q8AM (12/14)  Seizure PPX - Keppra  Alzheimer's Dementia - Namenda 5mg BID  Pyuria - Rocephin   CAD, PAFIB - Cardizem, Lopressor, Lipitor, Hydralazine, Labetalol  HTN - Hydralazine, labetalol   Sleep - Melatonin    Oropharyngeal Dysphagia - Puree & MOD THICK Liquids  Pain - Tylenol   DVT PPX/LUE DVT - SCD, apixaban   Rehab/Impaired mobility and function - Continuous hospitalization is crucial for managing the patient's acute medical issues (intracranial bleed, AMS, hemiplegia), which have significantly impacted their mobility, quality of life, and function. Rehabilitation recommendations will be based on the patient's functional progress and their ability to participate in and tolerate therapeutic interventions, which may change over time. Maintaining ongoing mobilization by the staff is imperative to prevent secondary medical complications and associated health issues related to debility.    Given patient's medical diagnosis, functional status, and potential for progress, he is likely a good candidate for [facility] placement. He currently DOES NOT meet the criteria for acute inpatient rehabilitation and would not tolerate 3h of intensive PT/OT/SLP daily. Discharge recommendations are subject to change and PM&R team will continue to follow to monitor progress while in the hospital.     Patient with limited wakefulness this AM. Recommend restarting Provigil 100mg 0800 (already ordered). Recommend OT for splint evaluation and left sided resting hand splint to be worn throughout the day (will place orders).

## 2023-12-15 NOTE — PROGRESS NOTE ADULT - ASSESSMENT
83M s/p R cranioplasty POD#8  Hx CAD, stents  started on apixaban    Plan:  Pt on stepdown unit with vitals q 4 and the neuro ck q2  -pt has started apixaban 12/14 and therefore, f/u ct of the brain ordered for tonight  - pt remains on keppra 500mg q12  -rocephin 1000mg q24  -Cardura for urinary retention  -metoprolol  -diet-pureed, thick liquids  -Consider repeating speech and swallow consult

## 2023-12-16 LAB
ANION GAP SERPL CALC-SCNC: 13 MMOL/L — SIGNIFICANT CHANGE UP (ref 5–17)
ANION GAP SERPL CALC-SCNC: 13 MMOL/L — SIGNIFICANT CHANGE UP (ref 5–17)
BUN SERPL-MCNC: 11.4 MG/DL — SIGNIFICANT CHANGE UP (ref 8–20)
BUN SERPL-MCNC: 11.4 MG/DL — SIGNIFICANT CHANGE UP (ref 8–20)
CALCIUM SERPL-MCNC: 8.8 MG/DL — SIGNIFICANT CHANGE UP (ref 8.4–10.5)
CALCIUM SERPL-MCNC: 8.8 MG/DL — SIGNIFICANT CHANGE UP (ref 8.4–10.5)
CHLORIDE SERPL-SCNC: 106 MMOL/L — SIGNIFICANT CHANGE UP (ref 96–108)
CHLORIDE SERPL-SCNC: 106 MMOL/L — SIGNIFICANT CHANGE UP (ref 96–108)
CO2 SERPL-SCNC: 21 MMOL/L — LOW (ref 22–29)
CO2 SERPL-SCNC: 21 MMOL/L — LOW (ref 22–29)
CREAT SERPL-MCNC: 0.71 MG/DL — SIGNIFICANT CHANGE UP (ref 0.5–1.3)
CREAT SERPL-MCNC: 0.71 MG/DL — SIGNIFICANT CHANGE UP (ref 0.5–1.3)
EGFR: 92 ML/MIN/1.73M2 — SIGNIFICANT CHANGE UP
EGFR: 92 ML/MIN/1.73M2 — SIGNIFICANT CHANGE UP
GLUCOSE SERPL-MCNC: 116 MG/DL — HIGH (ref 70–99)
GLUCOSE SERPL-MCNC: 116 MG/DL — HIGH (ref 70–99)
HCT VFR BLD CALC: 25 % — LOW (ref 39–50)
HCT VFR BLD CALC: 25 % — LOW (ref 39–50)
HGB BLD-MCNC: 8.2 G/DL — LOW (ref 13–17)
HGB BLD-MCNC: 8.2 G/DL — LOW (ref 13–17)
MAGNESIUM SERPL-MCNC: 1.5 MG/DL — LOW (ref 1.8–2.6)
MAGNESIUM SERPL-MCNC: 1.5 MG/DL — LOW (ref 1.8–2.6)
MCHC RBC-ENTMCNC: 30.8 PG — SIGNIFICANT CHANGE UP (ref 27–34)
MCHC RBC-ENTMCNC: 30.8 PG — SIGNIFICANT CHANGE UP (ref 27–34)
MCHC RBC-ENTMCNC: 32.8 GM/DL — SIGNIFICANT CHANGE UP (ref 32–36)
MCHC RBC-ENTMCNC: 32.8 GM/DL — SIGNIFICANT CHANGE UP (ref 32–36)
MCV RBC AUTO: 94 FL — SIGNIFICANT CHANGE UP (ref 80–100)
MCV RBC AUTO: 94 FL — SIGNIFICANT CHANGE UP (ref 80–100)
PHOSPHATE SERPL-MCNC: 2.8 MG/DL — SIGNIFICANT CHANGE UP (ref 2.4–4.7)
PHOSPHATE SERPL-MCNC: 2.8 MG/DL — SIGNIFICANT CHANGE UP (ref 2.4–4.7)
PLATELET # BLD AUTO: 163 K/UL — SIGNIFICANT CHANGE UP (ref 150–400)
PLATELET # BLD AUTO: 163 K/UL — SIGNIFICANT CHANGE UP (ref 150–400)
POTASSIUM SERPL-MCNC: 4.1 MMOL/L — SIGNIFICANT CHANGE UP (ref 3.5–5.3)
POTASSIUM SERPL-MCNC: 4.1 MMOL/L — SIGNIFICANT CHANGE UP (ref 3.5–5.3)
POTASSIUM SERPL-SCNC: 4.1 MMOL/L — SIGNIFICANT CHANGE UP (ref 3.5–5.3)
POTASSIUM SERPL-SCNC: 4.1 MMOL/L — SIGNIFICANT CHANGE UP (ref 3.5–5.3)
RBC # BLD: 2.66 M/UL — LOW (ref 4.2–5.8)
RBC # BLD: 2.66 M/UL — LOW (ref 4.2–5.8)
RBC # FLD: 14.6 % — HIGH (ref 10.3–14.5)
RBC # FLD: 14.6 % — HIGH (ref 10.3–14.5)
SODIUM SERPL-SCNC: 139 MMOL/L — SIGNIFICANT CHANGE UP (ref 135–145)
SODIUM SERPL-SCNC: 139 MMOL/L — SIGNIFICANT CHANGE UP (ref 135–145)
WBC # BLD: 7.34 K/UL — SIGNIFICANT CHANGE UP (ref 3.8–10.5)
WBC # BLD: 7.34 K/UL — SIGNIFICANT CHANGE UP (ref 3.8–10.5)
WBC # FLD AUTO: 7.34 K/UL — SIGNIFICANT CHANGE UP (ref 3.8–10.5)
WBC # FLD AUTO: 7.34 K/UL — SIGNIFICANT CHANGE UP (ref 3.8–10.5)

## 2023-12-16 PROCEDURE — 70450 CT HEAD/BRAIN W/O DYE: CPT | Mod: 26,76

## 2023-12-16 PROCEDURE — 70450 CT HEAD/BRAIN W/O DYE: CPT | Mod: 26,77

## 2023-12-16 PROCEDURE — 99291 CRITICAL CARE FIRST HOUR: CPT

## 2023-12-16 RX ORDER — ANDEXANET ALFA 200 MG/20ML
480 INJECTION, POWDER, LYOPHILIZED, FOR SOLUTION INTRAVENOUS ONCE
Refills: 0 | Status: COMPLETED | OUTPATIENT
Start: 2023-12-16 | End: 2023-12-16

## 2023-12-16 RX ORDER — ANDEXANET ALFA 200 MG/20ML
400 INJECTION, POWDER, LYOPHILIZED, FOR SOLUTION INTRAVENOUS ONCE
Refills: 0 | Status: COMPLETED | OUTPATIENT
Start: 2023-12-16 | End: 2023-12-16

## 2023-12-16 RX ADMIN — Medication 50 MILLIGRAM(S): at 01:18

## 2023-12-16 RX ADMIN — Medication 50 MILLIGRAM(S): at 12:45

## 2023-12-16 RX ADMIN — Medication 2 MILLIGRAM(S): at 23:20

## 2023-12-16 RX ADMIN — Medication 50 MILLIGRAM(S): at 23:20

## 2023-12-16 RX ADMIN — POLYETHYLENE GLYCOL 3350 17 GRAM(S): 17 POWDER, FOR SOLUTION ORAL at 12:43

## 2023-12-16 RX ADMIN — ANDEXANET ALFA 24 MILLIGRAM(S): 200 INJECTION, POWDER, LYOPHILIZED, FOR SOLUTION INTRAVENOUS at 03:14

## 2023-12-16 RX ADMIN — Medication 10 MILLIGRAM(S): at 21:36

## 2023-12-16 RX ADMIN — MEMANTINE HYDROCHLORIDE 5 MILLIGRAM(S): 10 TABLET ORAL at 08:56

## 2023-12-16 RX ADMIN — OXYCODONE HYDROCHLORIDE 5 MILLIGRAM(S): 5 TABLET ORAL at 08:00

## 2023-12-16 RX ADMIN — Medication 1 TABLET(S): at 18:20

## 2023-12-16 RX ADMIN — CEFTRIAXONE 1000 MILLIGRAM(S): 500 INJECTION, POWDER, FOR SOLUTION INTRAMUSCULAR; INTRAVENOUS at 23:20

## 2023-12-16 RX ADMIN — Medication 650 MILLIGRAM(S): at 12:46

## 2023-12-16 RX ADMIN — MODAFINIL 100 MILLIGRAM(S): 200 TABLET ORAL at 12:44

## 2023-12-16 RX ADMIN — MEMANTINE HYDROCHLORIDE 5 MILLIGRAM(S): 10 TABLET ORAL at 18:20

## 2023-12-16 RX ADMIN — Medication 1 MILLIGRAM(S): at 12:44

## 2023-12-16 RX ADMIN — SENNA PLUS 2 TABLET(S): 8.6 TABLET ORAL at 23:20

## 2023-12-16 RX ADMIN — LEVETIRACETAM 400 MILLIGRAM(S): 250 TABLET, FILM COATED ORAL at 18:20

## 2023-12-16 RX ADMIN — ATORVASTATIN CALCIUM 80 MILLIGRAM(S): 80 TABLET, FILM COATED ORAL at 23:20

## 2023-12-16 RX ADMIN — Medication 5 MILLIGRAM(S): at 23:20

## 2023-12-16 RX ADMIN — Medication 10 MILLIGRAM(S): at 21:50

## 2023-12-16 RX ADMIN — LEVETIRACETAM 400 MILLIGRAM(S): 250 TABLET, FILM COATED ORAL at 05:51

## 2023-12-16 RX ADMIN — ANDEXANET ALFA 184.62 MILLIGRAM(S): 200 INJECTION, POWDER, LYOPHILIZED, FOR SOLUTION INTRAVENOUS at 02:55

## 2023-12-16 RX ADMIN — Medication 60 MILLIGRAM(S): at 18:21

## 2023-12-16 RX ADMIN — Medication 60 MILLIGRAM(S): at 07:21

## 2023-12-16 RX ADMIN — Medication 10 MILLIGRAM(S): at 04:21

## 2023-12-16 RX ADMIN — Medication 650 MILLIGRAM(S): at 13:46

## 2023-12-16 RX ADMIN — SODIUM CHLORIDE 3 MILLILITER(S): 9 INJECTION INTRAMUSCULAR; INTRAVENOUS; SUBCUTANEOUS at 22:04

## 2023-12-16 NOTE — PROGRESS NOTE ADULT - ASSESSMENT
83M w/ PMH HTN, CAD s/p stents on ASA/Plavix, RCC s/p L nephrectomy, bladder CA, BPH, CHF, LBBB, vertigo, HLD, 5.5 cm AAA without rupture post EVAR, paroxysmal afib on Eliquis, early stage Alzheimer's dementia, presented to [facility] 11/10/23 s/p unwitnessed fall with head strike at home found by wife, found with multicompartmental ICH, s/p R craniectomy 11/10/23, course significant for Klebsiella pneumonia, hypernatremia, lethargy, LUE DVT, new anterior falcine SDH.  s/p R cranioplasty POD#9  -CTH s/p starting eliquis showed new R frontal ICH. 4hr repeat CTH showed worsening R frontal ICH.     Plan    - Upgrade to NeuroICU  - Q1h neuro checks  - Reverse w/ Andexxa  - Repeat CTH in 4 hrs (~5am)  - Continue to hold Eliquis and ASA, unlikely to be able to restart Eliquis in the future given high risk of rebleed  - CTH this AM prior to beginning Eliquis  - Pain control PRN; avoid oversedation. Tylenol Oxy 5/10   - Keppra 500 BID  - HOB 30 degrees   - SBP < 160  - Continue PO meds; Amantadine 150 QAM, Memantine 5 BID, Provigil 100, Lipitor 80, FA, Melatonin, Nephro-roma  - Record BMs; Miralax, Senna LBM 12/15  - PT/OT  - Wound: Permitted to daily showers using gentle soap near incision, pat dry, leave open to air.   - Further medical management per NeuroICU  - D/w Dr. Larios   83M w/ PMH HTN, CAD s/p stents on ASA/Plavix, RCC s/p L nephrectomy, bladder CA, BPH, CHF, LBBB, vertigo, HLD, 5.5 cm AAA without rupture post EVAR, paroxysmal afib on Eliquis, early stage Alzheimer's dementia, presented to [facility] 11/10/23 s/p unwitnessed fall with head strike at home found by wife, found with multicompartmental ICH, s/p R craniectomy 11/10/23, course significant for Klebsiella pneumonia, hypernatremia, lethargy, LUE DVT, new anterior falcine SDH.  s/p R cranioplasty POD#9  -CTH s/p starting eliquis showed new R frontal ICH. 4hr repeat CTH showed worsening R frontal ICH.     Plan    - Upgrade to NeuroICU  - Q1h neuro checks  - Reverse w/ Andexxa  - Repeat CTH in 4 hrs (~5am)  - Continue to hold Eliquis and ASA, unlikely to be able to restart Eliquis in the future given high risk of rebleed  - CTH this AM prior to beginning Eliquis  - Pain control PRN; avoid oversedation. Tylenol Oxy 5/10   - Keppra 500 BID  - HOB 30 degrees   - SBP < 160  - Continue PO meds; Amantadine 150 QAM, Memantine 5 BID, Provigil 100, Lipitor 80, FA, Melatonin, Nephro-roma  - Record BMs; Miralax, Senna LBM 12/15  - PT/OT  - Wound: Permitted to daily showers using gentle soap near incision, pat dry, leave open to air.   - Further medical management per NeuroICU  - D/w Dr. Larios

## 2023-12-16 NOTE — PROGRESS NOTE ADULT - SUBJECTIVE AND OBJECTIVE BOX
SUBJECTIVE:   83M w/ PMH HTN, CAD s/p stents on ASA/Plavix, RCC s/p L nephrectomy, bladder CA, BPH, CHF, LBBB, vertigo, HLD, 5.5 cm AAA without rupture post EVAR, paroxysmal afib on Eliquis, early stage Alzheimer's dementia, presented to [facility] 11/10/23 s/p unwitnessed fall with head strike at home found by wife, found with multicompartmental ICH, s/p R craniectomy 11/10/23, course significant for Klebsiella pneumonia, hypernatremia, lethargy, LUE DVT, new anterior falcine SDH, downgraded to SDU 11/28  s/p R cranioplasty POD#9    INTERVAL HPI/OVERNIGHT EVENTS:  Pt s/p right hemicraniectomy on 11/10, R cranioplasty on 12/7. 24hr CTH obtained after starting Eliquis 5mg BID showed new R frontal ICH. Eliquis and ASA were discontinued. Repeat 4hr CTH showed worsening R frontal ICH. Pt upgraded to NeuroICU. Reversed with Andexxa. Pt's exam has remained the same.     Vital Signs Last 24 Hrs  T(C): 36.5 (12-16-23 @ 00:31), Max: 37.5 (12-15-23 @ 19:50)  T(F): 97.7 (12-16-23 @ 00:31), Max: 99.5 (12-15-23 @ 19:50)  HR: 89 (12-16-23 @ 00:26) (61 - 96)  BP: 142/93 (12-16-23 @ 00:26) (105/59 - 171/78)  BP(mean): 100 (12-16-23 @ 00:26) (74 - 119)  RR: 16 (12-16-23 @ 00:26) (16 - 22)  SpO2: 100% (12-16-23 @ 00:26) (96% - 100%)      PHYSICAL EXAM  GENERAL: NAD, well-groomed  HEAD:  S/p R crani. Mild R temporal edema 2/2 crani site  JOEY COMA SCORE: E-4 V-4 M-6 = 14  MENTAL STATUS: AAO x2-3(name, place w/ choices, upcoming holiday "[place]"), answers simple questions, following simple commands on R, spontaneous eye opening, following most EOM.  CRANIAL NERVES: PERRL, EOMI without nystagmus. Mild L facial.  Tongue is midline. Hearing grossly intact.   MOTOR: strength 4+/5 RUE. RLE bending knee, LUE/LLE trace WD.    SENSATION: dec L sided sensation. grossly intact on R sided  SKIN: Warm, dry    LABS:                        7.7    6.66  )-----------( 139      ( 15 Dec 2023 07:12 )             23.6   12-15    138  |  106  |  12.5  ----------------------------<  102<H>  3.9   |  22.0  |  0.67    Ca    8.3<L>      15 Dec 2023 07:12  Phos  3.0     12-15  Mg     1.6     12-15        RADIOLOGY & ADDITIONAL TESTS:  CT Head No Cont (12.15.23 @ 19:59)   IMPRESSION:  1.  New 17 x 13 mm hemorrhage in the medial right frontal lobe.  2.  Mild interval decrease in size of the right hemispheric heterogeneous   subdural hematoma.  3.  Mild interval increased thickness of the hypodense fluid collection   superficial of the cranioplasty site.  4.  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    CT Head No Cont (12.14.23 @ 10:47)     IMPRESSION: The patient is status post right frontal prosthetic   cranioplasty. Adjacent to the cranioplasty is extra-axial collection of fluid, air, hemorrhage, smaller in size. Stable small right frontal   convexity subdural hypodense collection. Improved small hemorrhages in the posterior right frontal lobe with adjacent hypodensity. Thin posterior falx subdural hemorrhage tracks along the left tentorial leaflet, unchanged. No new hemorrhage.         SUBJECTIVE:   83M w/ PMH HTN, CAD s/p stents on ASA/Plavix, RCC s/p L nephrectomy, bladder CA, BPH, CHF, LBBB, vertigo, HLD, 5.5 cm AAA without rupture post EVAR, paroxysmal afib on Eliquis, early stage Alzheimer's dementia, presented to [facility] 11/10/23 s/p unwitnessed fall with head strike at home found by wife, found with multicompartmental ICH, s/p R craniectomy 11/10/23, course significant for Klebsiella pneumonia, hypernatremia, lethargy, LUE DVT, new anterior falcine SDH, downgraded to SDU 11/28  s/p R cranioplasty POD#9    INTERVAL HPI/OVERNIGHT EVENTS:  Pt s/p right hemicraniectomy on 11/10, R cranioplasty on 12/7. 24hr CTH obtained after starting Eliquis 5mg BID showed new R frontal ICH. Eliquis and ASA were discontinued. Repeat 4hr CTH showed worsening R frontal ICH. Pt upgraded to NeuroICU. Reversed with Andexxa. Pt's exam has remained the same.     Vital Signs Last 24 Hrs  T(C): 36.5 (12-16-23 @ 00:31), Max: 37.5 (12-15-23 @ 19:50)  T(F): 97.7 (12-16-23 @ 00:31), Max: 99.5 (12-15-23 @ 19:50)  HR: 89 (12-16-23 @ 00:26) (61 - 96)  BP: 142/93 (12-16-23 @ 00:26) (105/59 - 171/78)  BP(mean): 100 (12-16-23 @ 00:26) (74 - 119)  RR: 16 (12-16-23 @ 00:26) (16 - 22)  SpO2: 100% (12-16-23 @ 00:26) (96% - 100%)      PHYSICAL EXAM  GENERAL: NAD, well-groomed  HEAD:  S/p R crani. Mild R temporal edema 2/2 crani site  JOEY COMA SCORE: E-4 V-4 M-6 = 14  MENTAL STATUS: AAO x2-3(name, place w/ choices, upcoming holiday "[place]"), answers simple questions, following simple commands on R, spontaneous eye opening, following most EOM.  CRANIAL NERVES: PERRL, EOMI without nystagmus. Mild L facial.  Tongue is midline. Hearing grossly intact.   MOTOR: strength 4+/5 RUE. RLE bending knee, LUE/LLE trace WD.    SENSATION: dec L sided sensation. grossly intact on R sided  SKIN: Warm, dry    LABS:                        7.7    6.66  )-----------( 139      ( 15 Dec 2023 07:12 )             23.6   12-15    138  |  106  |  12.5  ----------------------------<  102<H>  3.9   |  22.0  |  0.67    Ca    8.3<L>      15 Dec 2023 07:12  Phos  3.0     12-15  Mg     1.6     12-15        RADIOLOGY & ADDITIONAL TESTS:  CT Head No Cont (12.15.23 @ 19:59)   IMPRESSION:  1.  New 17 x 13 mm hemorrhage in the medial right frontal lobe.  2.  Mild interval decrease in size of the right hemispheric heterogeneous   subdural hematoma.  3.  Mild interval increased thickness of the hypodense fluid collection   superficial of the cranioplasty site.  4.  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    CT Head No Cont (12.14.23 @ 10:47)     IMPRESSION: The patient is status post right frontal prosthetic   cranioplasty. Adjacent to the cranioplasty is extra-axial collection of fluid, air, hemorrhage, smaller in size. Stable small right frontal   convexity subdural hypodense collection. Improved small hemorrhages in the posterior right frontal lobe with adjacent hypodensity. Thin posterior falx subdural hemorrhage tracks along the left tentorial leaflet, unchanged. No new hemorrhage.

## 2023-12-16 NOTE — PROGRESS NOTE ADULT - SUBJECTIVE AND OBJECTIVE BOX
SUBJECTIVE:   83M w/ PMH HTN, CAD s/p stents on ASA/Plavix, RCC s/p L nephrectomy, bladder CA, BPH, CHF, LBBB, vertigo, HLD, 5.5 cm AAA without rupture post EVAR, paroxysmal afib on Eliquis, early stage Alzheimer's dementia, presented to [facility] 11/10/23 s/p unwitnessed fall with head strike at home found by wife, found with multicompartmental ICH, s/p R craniectomy 11/10/23, course significant for Klebsiella pneumonia, hypernatremia, lethargy, LUE DVT, new anterior falcine SDH, downgraded to SDU 11/28  s/p R cranioplasty     24h EVENTS:  11/10 right hemicraniectomy   12/7 - R cranioplasty   12/16 - 24hr CTH obtained after starting Eliquis 5mg BID showed new R frontal ICH. Eliquis; ASA dc. 4hr CTH worsening R frontal ICH. Pt upgraded to NeuroICU. Reversed with Andexxa. Repeat CTH unchanged.      PHYSICAL EXAM  GENERAL: NAD, well-groomed  HEAD:  S/p R crani. Mild R temporal edema 2/2 crani site  JOEY COMA SCORE: E-4 V-4 M-6 = 14  MENTAL STATUS: AAO x2-3(name, place w/ choices, upcoming holiday "Christmas"), answers simple questions, following simple commands on R, spontaneous eye opening, following most EOM.  CRANIAL NERVES: PERRL, EOMI without nystagmus. Mild L facial.  Tongue is midline. Hearing grossly intact.   MOTOR: strength 4+/5 RUE. RLE bending knee, LUE/LLE trace WD.    SENSATION: dec L sided sensation. grossly intact on R sided  SKIN: Warm, dry    ------------------------------------------------------------------------------------------------------  ICU Vital Signs Last 24 Hrs  T(C): 36.6 (16 Dec 2023 08:30), Max: 37.5 (15 Dec 2023 19:50)  T(F): 97.9 (16 Dec 2023 08:30), Max: 99.5 (15 Dec 2023 19:50)  HR: 102 (16 Dec 2023 13:45) (70 - 118)  BP: 121/69 (16 Dec 2023 13:45) (110/58 - 171/78)  BP(mean): 85 (16 Dec 2023 13:45) (73 - 119)  ABP: --  ABP(mean): --  RR: 22 (16 Dec 2023 13:45) (13 - 26)  SpO2: 98% (16 Dec 2023 13:45) (95% - 100%)    O2 Parameters below as of 16 Dec 2023 04:00  Patient On (Oxygen Delivery Method): room air            I&O's Summary    15 Dec 2023 07:01  -  16 Dec 2023 07:00  --------------------------------------------------------  IN: 0 mL / OUT: 3375 mL / NET: -3375 mL    16 Dec 2023 07:01  -  16 Dec 2023 15:41  --------------------------------------------------------  IN: 525 mL / OUT: 450 mL / NET: 75 mL        MEDICATIONS  (STANDING):  atorvastatin 80 milliGRAM(s) Oral at bedtime  cefTRIAXone Injectable. 1000 milliGRAM(s) IV Push every 24 hours  diltiazem    Tablet 60 milliGRAM(s) Oral <User Schedule>  doxazosin 2 milliGRAM(s) Oral at bedtime  folic acid 1 milliGRAM(s) Oral daily  levETIRAcetam  IVPB 500 milliGRAM(s) IV Intermittent every 12 hours  melatonin 5 milliGRAM(s) Oral at bedtime  memantine 5 milliGRAM(s) Oral two times a day  metoprolol tartrate 50 milliGRAM(s) Oral <User Schedule>  modafinil 100 milliGRAM(s) Oral <User Schedule>  Nephro-roma 1 Tablet(s) Oral daily  polyethylene glycol 3350 17 Gram(s) Oral daily  senna 2 Tablet(s) Oral at bedtime  sodium chloride 0.9% lock flush 3 milliLiter(s) IV Push every 8 hours  sodium chloride 0.9%. 1000 milliLiter(s) (75 mL/Hr) IV Continuous <Continuous>      RESPIRATORY:      IMAGING:   Recent imaging studies were reviewed.    LAB RESULTS:                          8.2    7.34  )-----------( 163      ( 16 Dec 2023 04:16 )             25.0           12-16    139  |  106  |  11.4  ----------------------------<  116<H>  4.1   |  21.0<L>  |  0.71    Ca    8.8      16 Dec 2023 04:16  Phos  2.8     12-16  Mg     1.5     12-16                     SUBJECTIVE:   83M w/ PMH HTN, CAD s/p stents on ASA/Plavix, RCC s/p L nephrectomy, bladder CA, BPH, CHF, LBBB, vertigo, HLD, 5.5 cm AAA without rupture post EVAR, paroxysmal afib on Eliquis, early stage Alzheimer's dementia, presented to [facility] 11/10/23 s/p unwitnessed fall with head strike at home found by wife, found with multicompartmental ICH, s/p R craniectomy 11/10/23, course significant for Klebsiella pneumonia, hypernatremia, lethargy, LUE DVT, new anterior falcine SDH, downgraded to SDU 11/28  s/p R cranioplasty     24h EVENTS:  11/10 right hemicraniectomy   12/7 - R cranioplasty   12/16 - 24hr CTH obtained after starting Eliquis 5mg BID showed new R frontal ICH. Eliquis; ASA dc. 4hr CTH worsening R frontal ICH. Pt upgraded to NeuroICU. Reversed with Andexxa. Repeat CTH unchanged.      PHYSICAL EXAM  GENERAL: NAD, well-groomed  HEAD:  S/p R crani. Mild R temporal edema 2/2 crani site  JOEY COMA SCORE: E-4 V-4 M-6 = 14  MENTAL STATUS: AAO x2-3(name, place w/ choices, upcoming holiday "Christmas"), answers simple questions, following simple commands on R, spontaneous eye opening, following most EOM.  CRANIAL NERVES: PERRL, EOMI without nystagmus. Mild L facial.  Tongue is midline. Hearing grossly intact.   MOTOR: strength 4+/5 RUE. RLE bending knee, LUE/LLE trace WD.    SENSATION: dec L sided sensation. grossly intact on R sided  SKIN: Warm, dry    ------------------------------------------------------------------------------------------------------  ICU Vital Signs Last 24 Hrs  T(C): 36.6 (16 Dec 2023 08:30), Max: 37.5 (15 Dec 2023 19:50)  T(F): 97.9 (16 Dec 2023 08:30), Max: 99.5 (15 Dec 2023 19:50)  HR: 102 (16 Dec 2023 13:45) (70 - 118)  BP: 121/69 (16 Dec 2023 13:45) (110/58 - 171/78)  BP(mean): 85 (16 Dec 2023 13:45) (73 - 119)  ABP: --  ABP(mean): --  RR: 22 (16 Dec 2023 13:45) (13 - 26)  SpO2: 98% (16 Dec 2023 13:45) (95% - 100%)    O2 Parameters below as of 16 Dec 2023 04:00  Patient On (Oxygen Delivery Method): room air            I&O's Summary    15 Dec 2023 07:01  -  16 Dec 2023 07:00  --------------------------------------------------------  IN: 0 mL / OUT: 3375 mL / NET: -3375 mL    16 Dec 2023 07:01  -  16 Dec 2023 15:41  --------------------------------------------------------  IN: 525 mL / OUT: 450 mL / NET: 75 mL        MEDICATIONS  (STANDING):  atorvastatin 80 milliGRAM(s) Oral at bedtime  cefTRIAXone Injectable. 1000 milliGRAM(s) IV Push every 24 hours  diltiazem    Tablet 60 milliGRAM(s) Oral <User Schedule>  doxazosin 2 milliGRAM(s) Oral at bedtime  folic acid 1 milliGRAM(s) Oral daily  levETIRAcetam  IVPB 500 milliGRAM(s) IV Intermittent every 12 hours  melatonin 5 milliGRAM(s) Oral at bedtime  memantine 5 milliGRAM(s) Oral two times a day  metoprolol tartrate 50 milliGRAM(s) Oral <User Schedule>  modafinil 100 milliGRAM(s) Oral <User Schedule>  Nephro-roma 1 Tablet(s) Oral daily  polyethylene glycol 3350 17 Gram(s) Oral daily  senna 2 Tablet(s) Oral at bedtime  sodium chloride 0.9% lock flush 3 milliLiter(s) IV Push every 8 hours  sodium chloride 0.9%. 1000 milliLiter(s) (75 mL/Hr) IV Continuous <Continuous>      RESPIRATORY:      IMAGING:   Recent imaging studies were reviewed.    LAB RESULTS:                          8.2    7.34  )-----------( 163      ( 16 Dec 2023 04:16 )             25.0           12-16    139  |  106  |  11.4  ----------------------------<  116<H>  4.1   |  21.0<L>  |  0.71    Ca    8.8      16 Dec 2023 04:16  Phos  2.8     12-16  Mg     1.5     12-16

## 2023-12-16 NOTE — PROGRESS NOTE ADULT - ASSESSMENT
83M w/ PMH HTN, CAD s/p stents on ASA/Plavix, RCC s/p L nephrectomy, bladder CA, BPH, CHF, LBBB, vertigo, HLD, 5.5 cm AAA without rupture post EVAR, paroxysmal afib on Eliquis, early stage Alzheimer's dementia, presented to [facility] 11/10/23 s/p unwitnessed fall with head strike at home found by wife, found with multicompartmental ICH, s/p R craniectomy 11/10/23, course significant for Klebsiella pneumonia, hypernatremia, lethargy, LUE DVT, new anterior falcine SDH.    Plan    - Upgrade to NeuroICU  - Q1->2h neuro checks  - Continue to hold Eliquis and ASA, unlikely to be able to restart Eliquis in the future given high risk of rebleed  - Pain control PRN; avoid oversedation. Tylenol Oxy 5/10   - Keppra 500 BID  - SBP < 160  - Continue PO meds; Amantadine 150 QAM, Memantine 5 BID, Provigil 100, Lipitor 80, FA, Melatonin, Nephro-roma  -Miralax, Senna LBM   - PT/OT  - Wound: Permitted to daily showers using gentle soap near incision, pat dry, leave open to air.   - Further medical management per NeuroICU   83M w/ PMH HTN, CAD s/p stents on ASA/Plavix, RCC s/p L nephrectomy, bladder CA, BPH, CHF, LBBB, vertigo, HLD, 5.5 cm AAA without rupture post EVAR, paroxysmal afib on Eliquis, early stage Alzheimer's dementia, presented to [facility] 11/10/23 s/p unwitnessed fall with head strike at home found by wife, found with multicompartmental ICH, s/p R craniectomy 11/10/23, course significant for Klebsiella pneumonia, hypernatremia, lethargy, LUE DVT, new anterior falcine SDH.    Plan    - Upgrade to NeuroICU  - Q1->2h neuro checks  - Continue to hold Eliquis and ASA, unlikely to be able to restart Eliquis in the future given high risk of rebleed  - Pain control PRN; avoid oversedation. Tylenol Oxy 5/10   - Keppra 500 BID  - SBP < 160  - Continue PO meds; Amantadine 150 QAM, Memantine 5 BID, Provigil 100, Lipitor 80, FA, Melatonin, Nephro-roma  -Miralax, Senna LBM   - PT/OT  - Wound: Permitted to daily showers using gentle soap near incision, pat dry, leave open to air.   - Further medical management per NeuroICU

## 2023-12-17 LAB
ANION GAP SERPL CALC-SCNC: 14 MMOL/L — SIGNIFICANT CHANGE UP (ref 5–17)
ANION GAP SERPL CALC-SCNC: 14 MMOL/L — SIGNIFICANT CHANGE UP (ref 5–17)
BUN SERPL-MCNC: 15.4 MG/DL — SIGNIFICANT CHANGE UP (ref 8–20)
BUN SERPL-MCNC: 15.4 MG/DL — SIGNIFICANT CHANGE UP (ref 8–20)
CALCIUM SERPL-MCNC: 8.6 MG/DL — SIGNIFICANT CHANGE UP (ref 8.4–10.5)
CALCIUM SERPL-MCNC: 8.6 MG/DL — SIGNIFICANT CHANGE UP (ref 8.4–10.5)
CHLORIDE SERPL-SCNC: 103 MMOL/L — SIGNIFICANT CHANGE UP (ref 96–108)
CHLORIDE SERPL-SCNC: 103 MMOL/L — SIGNIFICANT CHANGE UP (ref 96–108)
CO2 SERPL-SCNC: 20 MMOL/L — LOW (ref 22–29)
CO2 SERPL-SCNC: 20 MMOL/L — LOW (ref 22–29)
CREAT SERPL-MCNC: 0.74 MG/DL — SIGNIFICANT CHANGE UP (ref 0.5–1.3)
CREAT SERPL-MCNC: 0.74 MG/DL — SIGNIFICANT CHANGE UP (ref 0.5–1.3)
EGFR: 90 ML/MIN/1.73M2 — SIGNIFICANT CHANGE UP
EGFR: 90 ML/MIN/1.73M2 — SIGNIFICANT CHANGE UP
GLUCOSE SERPL-MCNC: 117 MG/DL — HIGH (ref 70–99)
GLUCOSE SERPL-MCNC: 117 MG/DL — HIGH (ref 70–99)
HCT VFR BLD CALC: 24.7 % — LOW (ref 39–50)
HCT VFR BLD CALC: 24.7 % — LOW (ref 39–50)
HGB BLD-MCNC: 8.4 G/DL — LOW (ref 13–17)
HGB BLD-MCNC: 8.4 G/DL — LOW (ref 13–17)
MAGNESIUM SERPL-MCNC: 1.5 MG/DL — LOW (ref 1.6–2.6)
MAGNESIUM SERPL-MCNC: 1.5 MG/DL — LOW (ref 1.6–2.6)
MCHC RBC-ENTMCNC: 32.4 PG — SIGNIFICANT CHANGE UP (ref 27–34)
MCHC RBC-ENTMCNC: 32.4 PG — SIGNIFICANT CHANGE UP (ref 27–34)
MCHC RBC-ENTMCNC: 34 GM/DL — SIGNIFICANT CHANGE UP (ref 32–36)
MCHC RBC-ENTMCNC: 34 GM/DL — SIGNIFICANT CHANGE UP (ref 32–36)
MCV RBC AUTO: 95.4 FL — SIGNIFICANT CHANGE UP (ref 80–100)
MCV RBC AUTO: 95.4 FL — SIGNIFICANT CHANGE UP (ref 80–100)
PHOSPHATE SERPL-MCNC: 3.9 MG/DL — SIGNIFICANT CHANGE UP (ref 2.4–4.7)
PHOSPHATE SERPL-MCNC: 3.9 MG/DL — SIGNIFICANT CHANGE UP (ref 2.4–4.7)
PLATELET # BLD AUTO: 152 K/UL — SIGNIFICANT CHANGE UP (ref 150–400)
PLATELET # BLD AUTO: 152 K/UL — SIGNIFICANT CHANGE UP (ref 150–400)
POTASSIUM SERPL-MCNC: 4.3 MMOL/L — SIGNIFICANT CHANGE UP (ref 3.5–5.3)
POTASSIUM SERPL-MCNC: 4.3 MMOL/L — SIGNIFICANT CHANGE UP (ref 3.5–5.3)
POTASSIUM SERPL-SCNC: 4.3 MMOL/L — SIGNIFICANT CHANGE UP (ref 3.5–5.3)
POTASSIUM SERPL-SCNC: 4.3 MMOL/L — SIGNIFICANT CHANGE UP (ref 3.5–5.3)
RBC # BLD: 2.59 M/UL — LOW (ref 4.2–5.8)
RBC # BLD: 2.59 M/UL — LOW (ref 4.2–5.8)
RBC # FLD: 15 % — HIGH (ref 10.3–14.5)
RBC # FLD: 15 % — HIGH (ref 10.3–14.5)
SODIUM SERPL-SCNC: 137 MMOL/L — SIGNIFICANT CHANGE UP (ref 135–145)
SODIUM SERPL-SCNC: 137 MMOL/L — SIGNIFICANT CHANGE UP (ref 135–145)
WBC # BLD: 9.2 K/UL — SIGNIFICANT CHANGE UP (ref 3.8–10.5)
WBC # BLD: 9.2 K/UL — SIGNIFICANT CHANGE UP (ref 3.8–10.5)
WBC # FLD AUTO: 9.2 K/UL — SIGNIFICANT CHANGE UP (ref 3.8–10.5)
WBC # FLD AUTO: 9.2 K/UL — SIGNIFICANT CHANGE UP (ref 3.8–10.5)

## 2023-12-17 PROCEDURE — 99233 SBSQ HOSP IP/OBS HIGH 50: CPT

## 2023-12-17 PROCEDURE — 99232 SBSQ HOSP IP/OBS MODERATE 35: CPT

## 2023-12-17 RX ORDER — SODIUM CHLORIDE 9 MG/ML
500 INJECTION INTRAMUSCULAR; INTRAVENOUS; SUBCUTANEOUS ONCE
Refills: 0 | Status: COMPLETED | OUTPATIENT
Start: 2023-12-17 | End: 2023-12-17

## 2023-12-17 RX ORDER — FONDAPARINUX SODIUM 2.5 MG/.5ML
1.2 INJECTION, SOLUTION SUBCUTANEOUS
Refills: 0 | Status: DISCONTINUED | OUTPATIENT
Start: 2023-12-17 | End: 2023-12-17

## 2023-12-17 RX ORDER — MAGNESIUM SULFATE 500 MG/ML
2 VIAL (ML) INJECTION ONCE
Refills: 0 | Status: COMPLETED | OUTPATIENT
Start: 2023-12-17 | End: 2023-12-17

## 2023-12-17 RX ORDER — FONDAPARINUX SODIUM 2.5 MG/.5ML
2.5 INJECTION, SOLUTION SUBCUTANEOUS
Refills: 0 | Status: DISCONTINUED | OUTPATIENT
Start: 2023-12-17 | End: 2023-12-21

## 2023-12-17 RX ORDER — ENOXAPARIN SODIUM 100 MG/ML
40 INJECTION SUBCUTANEOUS EVERY 24 HOURS
Refills: 0 | Status: DISCONTINUED | OUTPATIENT
Start: 2023-12-17 | End: 2023-12-17

## 2023-12-17 RX ORDER — CHLORHEXIDINE GLUCONATE 213 G/1000ML
1 SOLUTION TOPICAL
Refills: 0 | Status: DISCONTINUED | OUTPATIENT
Start: 2023-12-17 | End: 2024-01-12

## 2023-12-17 RX ADMIN — MEMANTINE HYDROCHLORIDE 5 MILLIGRAM(S): 10 TABLET ORAL at 05:19

## 2023-12-17 RX ADMIN — Medication 50 MILLIGRAM(S): at 12:14

## 2023-12-17 RX ADMIN — ATORVASTATIN CALCIUM 80 MILLIGRAM(S): 80 TABLET, FILM COATED ORAL at 23:20

## 2023-12-17 RX ADMIN — LEVETIRACETAM 400 MILLIGRAM(S): 250 TABLET, FILM COATED ORAL at 17:45

## 2023-12-17 RX ADMIN — CHLORHEXIDINE GLUCONATE 1 APPLICATION(S): 213 SOLUTION TOPICAL at 08:44

## 2023-12-17 RX ADMIN — POLYETHYLENE GLYCOL 3350 17 GRAM(S): 17 POWDER, FOR SOLUTION ORAL at 12:13

## 2023-12-17 RX ADMIN — Medication 2 MILLIGRAM(S): at 23:20

## 2023-12-17 RX ADMIN — Medication 60 MILLIGRAM(S): at 17:44

## 2023-12-17 RX ADMIN — Medication 60 MILLIGRAM(S): at 05:19

## 2023-12-17 RX ADMIN — FONDAPARINUX SODIUM 2.5 MILLIGRAM(S): 2.5 INJECTION, SOLUTION SUBCUTANEOUS at 17:45

## 2023-12-17 RX ADMIN — Medication 1 TABLET(S): at 12:14

## 2023-12-17 RX ADMIN — LEVETIRACETAM 400 MILLIGRAM(S): 250 TABLET, FILM COATED ORAL at 05:19

## 2023-12-17 RX ADMIN — Medication 1 MILLIGRAM(S): at 12:14

## 2023-12-17 RX ADMIN — Medication 25 GRAM(S): at 05:18

## 2023-12-17 RX ADMIN — Medication 50 MILLIGRAM(S): at 23:20

## 2023-12-17 RX ADMIN — MODAFINIL 100 MILLIGRAM(S): 200 TABLET ORAL at 08:44

## 2023-12-17 RX ADMIN — SODIUM CHLORIDE 1000 MILLILITER(S): 9 INJECTION INTRAMUSCULAR; INTRAVENOUS; SUBCUTANEOUS at 10:06

## 2023-12-17 RX ADMIN — MEMANTINE HYDROCHLORIDE 5 MILLIGRAM(S): 10 TABLET ORAL at 17:44

## 2023-12-17 NOTE — PROGRESS NOTE ADULT - SUBJECTIVE AND OBJECTIVE BOX
SUBJECTIVE:   83M w/ PMH HTN, CAD s/p stents on ASA/Plavix, RCC s/p L nephrectomy, bladder CA, BPH, CHF, LBBB, vertigo, HLD, 5.5 cm AAA without rupture post EVAR, paroxysmal afib on Eliquis, early stage Alzheimer's dementia, presented to [facility] 11/10/23 s/p unwitnessed fall with head strike at home found by wife, found with multicompartmental ICH, s/p R craniectomy 11/10/23, course significant for Klebsiella pneumonia, hypernatremia, lethargy, LUE DVT, new anterior falcine SDH, downgraded to SDU 11/28  s/p R cranioplasty POD#10    INTERVAL HPI/OVERNIGHT EVENTS:  Exam stable. Repeat CTH stable.     Vital Signs Last 24 Hrs  T(C): 36.9 (17 Dec 2023 01:00), Max: 37.4 (16 Dec 2023 21:00)  T(F): 98.4 (17 Dec 2023 01:00), Max: 99.3 (16 Dec 2023 21:00)  HR: 83 (17 Dec 2023 01:00) (73 - 118)  BP: 109/82 (17 Dec 2023 01:00) (97/58 - 166/89)  BP(mean): 92 (17 Dec 2023 01:00) (69 - 119)  ABP: --  ABP(mean): --  RR: 22 (17 Dec 2023 01:00) (13 - 26)  SpO2: 99% (17 Dec 2023 01:00) (95% - 100%)    O2 Parameters below as of 17 Dec 2023 00:00  Patient On (Oxygen Delivery Method): room air    PHYSICAL EXAM  GENERAL: NAD, well-groomed  HEAD:  S/p R crani. Mild R temporal edema 2/2 crani site  JOEY COMA SCORE: E-4 V-4 M-6 = 14  MENTAL STATUS: AAO x2-3(name, place w/ choices, upcoming holiday "Christmas"), answers simple questions, following simple commands on R, spontaneous eye opening, following most EOM.  CRANIAL NERVES: PERRL, EOMI without nystagmus. Mild L facial.  Tongue is midline. Hearing grossly intact.   MOTOR: strength 4+/5 RUE. RLE bending knee, LUE/LLE trace WD.    SENSATION: dec L sided sensation. grossly intact on R sided  SKIN: Warm, dry    LABS:                                   8.2    7.34  )-----------( 163      ( 16 Dec 2023 04:16 )             25.0   12-16    139  |  106  |  11.4  ----------------------------<  116<H>  4.1   |  21.0<L>  |  0.71    Ca    8.8      16 Dec 2023 04:16  Phos  2.8     12-16  Mg     1.5     12-16        RADIOLOGY & ADDITIONAL TESTS:    < from: CT Head No Cont (12.15.23 @ 19:59) >  IMPRESSION:  1.  New 17 x 13 mm hemorrhage in the medial right frontal lobe.  2.  Mild interval decrease in size of the right hemispheric heterogeneous   subdural hematoma.  3.  Mild interval increased thickness of the hypodense fluid collection   superficial of the cranioplasty site.  4.  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    Critical findings discussed with Adelita Gloria PA-C by Dr. Dia Velazquez at   12/15/2023 8:45 PM.    --- End of Report ---            DIA VELAZQUEZ MD; Attending Radiologist  This document has been electronically signed. Dec 15 2023  8:45PM    < end of copied text >  < from: CT Head No Cont (12.16.23 @ 00:52) >  IMPRESSION: Left frontal lobe hemorrhage is slightly larger in size.   Stable small hemorrhages in the posterior right frontal lobe. Trace   intraventricular hemorrhage in the bilateral occipital horns.    --- End of Report ---            AUSTIN LANIER; Attending Radiologist  This document has been electronically signed. Dec 16 2023 11:40AM    < end of copied text >  < from: CT Head No Cont (12.16.23 @ 05:40) >  IMPRESSION: Right frontal lobe intraparenchymal hemorrhage measures 1.7 x   2.5 cm, unchanged. Right-sided cranioplasty with adjacent extradural   subdural collections, unchanged. Stable bifrontal pneumocephalus. Stable   small hemorrhages in the posterior right frontal lobe.  Slightly   increased intraventricular hemorrhage in bilateral occipital horns.    --- End of Report ---            AUSTIN RIVERA MD; Attending Radiologist  This document has been electronically signed. Dec 16 2023 11:25AM    < end of copied text >  < from: CT Head No Cont (12.16.23 @ 11:41) >  IMPRESSION:  1.  No significant interval change.    --- End of Report ---            DIA VELAZQUEZ MD; Attending Radiologist  This document has been electronically signed. Dec 16 2023  1:59PM    < end of copied text >      CT Head No Cont (12.15.23 @ 19:59)   IMPRESSION:  1.  New 17 x 13 mm hemorrhage in the medial right frontal lobe.  2.  Mild interval decrease in size of the right hemispheric heterogeneous   subdural hematoma.  3.  Mild interval increased thickness of the hypodense fluid collection   superficial of the cranioplasty site.  4.  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    CT Head No Cont (12.14.23 @ 10:47)     IMPRESSION: The patient is status post right frontal prosthetic   cranioplasty. Adjacent to the cranioplasty is extra-axial collection of fluid, air, hemorrhage, smaller in size. Stable small right frontal   convexity subdural hypodense collection. Improved small hemorrhages in the posterior right frontal lobe with adjacent hypodensity. Thin posterior falx subdural hemorrhage tracks along the left tentorial leaflet, unchanged. No new hemorrhage.         SUBJECTIVE:   83M w/ PMH HTN, CAD s/p stents on ASA/Plavix, RCC s/p L nephrectomy, bladder CA, BPH, CHF, LBBB, vertigo, HLD, 5.5 cm AAA without rupture post EVAR, paroxysmal afib on Eliquis, early stage Alzheimer's dementia, presented to [facility] 11/10/23 s/p unwitnessed fall with head strike at home found by wife, found with multicompartmental ICH, s/p R craniectomy 11/10/23, course significant for Klebsiella pneumonia, hypernatremia, lethargy, LUE DVT, new anterior falcine SDH, downgraded to SDU 11/28  s/p R cranioplasty POD#10    INTERVAL HPI/OVERNIGHT EVENTS:  Exam stable. Repeat CTH stable.     Vital Signs Last 24 Hrs  T(C): 36.9 (17 Dec 2023 01:00), Max: 37.4 (16 Dec 2023 21:00)  T(F): 98.4 (17 Dec 2023 01:00), Max: 99.3 (16 Dec 2023 21:00)  HR: 83 (17 Dec 2023 01:00) (73 - 118)  BP: 109/82 (17 Dec 2023 01:00) (97/58 - 166/89)  BP(mean): 92 (17 Dec 2023 01:00) (69 - 119)  ABP: --  ABP(mean): --  RR: 22 (17 Dec 2023 01:00) (13 - 26)  SpO2: 99% (17 Dec 2023 01:00) (95% - 100%)    O2 Parameters below as of 17 Dec 2023 00:00  Patient On (Oxygen Delivery Method): room air    PHYSICAL EXAM  GENERAL: NAD, well-groomed  HEAD:  S/p R crani. Mild R temporal edema 2/2 crani site  JOEY COMA SCORE: E-4 V-4 M-6 = 14  MENTAL STATUS: AAO x2-3(name, place w/ choices, upcoming holiday "Christmas"), answers simple questions, following simple commands on R, spontaneous eye opening, following most EOM.  CRANIAL NERVES: PERRL, EOMI without nystagmus. Mild L facial.  Tongue is midline. Hearing grossly intact.   MOTOR: strength 4+/5 RUE. RLE bending knee, LUE/LLE trace WD.    SENSATION: dec L sided sensation. grossly intact on R sided  SKIN: Warm, dry    LABS:                                   8.2    7.34  )-----------( 163      ( 16 Dec 2023 04:16 )             25.0   12-16    139  |  106  |  11.4  ----------------------------<  116<H>  4.1   |  21.0<L>  |  0.71    Ca    8.8      16 Dec 2023 04:16  Phos  2.8     12-16  Mg     1.5     12-16        RADIOLOGY & ADDITIONAL TESTS:    < from: CT Head No Cont (12.15.23 @ 19:59) >  IMPRESSION:  1.  New 17 x 13 mm hemorrhage in the medial right frontal lobe.  2.  Mild interval decrease in size of the right hemispheric heterogeneous   subdural hematoma.  3.  Mild interval increased thickness of the hypodense fluid collection   superficial of the cranioplasty site.  4.  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    Critical findings discussed with Adelita Gloria PA-C by Dr. Dia Velazquez at   12/15/2023 8:45 PM.    --- End of Report ---            DIA VELAZQUEZ MD; Attending Radiologist  This document has been electronically signed. Dec 15 2023  8:45PM    < end of copied text >  < from: CT Head No Cont (12.16.23 @ 00:52) >  IMPRESSION: Left frontal lobe hemorrhage is slightly larger in size.   Stable small hemorrhages in the posterior right frontal lobe. Trace   intraventricular hemorrhage in the bilateral occipital horns.    --- End of Report ---            AUSTIN LANIER; Attending Radiologist  This document has been electronically signed. Dec 16 2023 11:40AM    < end of copied text >  < from: CT Head No Cont (12.16.23 @ 05:40) >  IMPRESSION: Right frontal lobe intraparenchymal hemorrhage measures 1.7 x   2.5 cm, unchanged. Right-sided cranioplasty with adjacent extradural   subdural collections, unchanged. Stable bifrontal pneumocephalus. Stable   small hemorrhages in the posterior right frontal lobe.  Slightly   increased intraventricular hemorrhage in bilateral occipital horns.    --- End of Report ---            AUSTIN RIVERA MD; Attending Radiologist  This document has been electronically signed. Dec 16 2023 11:25AM    < end of copied text >  < from: CT Head No Cont (12.16.23 @ 11:41) >  IMPRESSION:  1.  No significant interval change.    --- End of Report ---            DIA VELAZQUEZ MD; Attending Radiologist  This document has been electronically signed. Dec 16 2023  1:59PM    < end of copied text >      CT Head No Cont (12.15.23 @ 19:59)   IMPRESSION:  1.  New 17 x 13 mm hemorrhage in the medial right frontal lobe.  2.  Mild interval decrease in size of the right hemispheric heterogeneous   subdural hematoma.  3.  Mild interval increased thickness of the hypodense fluid collection   superficial of the cranioplasty site.  4.  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    CT Head No Cont (12.14.23 @ 10:47)     IMPRESSION: The patient is status post right frontal prosthetic   cranioplasty. Adjacent to the cranioplasty is extra-axial collection of fluid, air, hemorrhage, smaller in size. Stable small right frontal   convexity subdural hypodense collection. Improved small hemorrhages in the posterior right frontal lobe with adjacent hypodensity. Thin posterior falx subdural hemorrhage tracks along the left tentorial leaflet, unchanged. No new hemorrhage.

## 2023-12-17 NOTE — PROGRESS NOTE ADULT - ASSESSMENT
84 y/o male with PMH of HTN, CAD s/p PCO, RCC s/p left nephrectomy, bladder Ca, BPH, CHF, Vertigo, HLD, AAA s/p EVAR, parox Afib, h/o HIT, Alzheimer transferred s/p fall with Rt frontal IPH. Initially had hemicraniectomy course complicated by RVR, aspiration PNA, hypoxic respiratory failure, LUE DVT and questionable increase in AAA size. Started on AC with waxing/waning mental status prompting head imaging with evidence of new bleed. Cardiology following. Neurology consulted after MRI showed stroke. Echo done concerning for mobile echodensity on aortic valve and strokes in multiple arterial territories; WHIT recommended but as noted documented but family declined. Patient was downgraded to medical floor and later upgraded to neuro ICU after cranioplasty; subgaleal drain was removed 12/10. Patient found to have low grade fever, tachycardic, blood culture sent, UA noted for pyuria. Downgraded to medicine team 12/10. After discussion with interdisciplinary teams, Eliquis and ASA was restarted. CT head obtained on 12/15 showed new increase in right frontal IPH. Upgraded again to neuroICU. Was given andexxa for reversal. Repeat CT heads are now stable. Now deemed stable to downgrad again to medicine.     IPH  -s/p decompressive R hemicraniotomy 11/10 and s/p R cranioplasty with replacement of bone flap 12/7  -additional repeat CTH 12/11 for change in neuro exam; stable   -s/p subgaleal drain removed 12/10   -CTH 12/14 s/p R frontal prosthetic cranioplasty, adjacent to cranioplasty is extra-axial collection of fluid, air, hemorrhage, smaller in size; stable small R frontal subdural hypodense collection; improved small hemorrhages in posterior R frontal lobe; no new hemorrhage   -neurosx following   -cont keppra bid   -Was restarted on AC and then repeat CT head showed increase in right frontal IPH  -Patient was then again transferred to NSICU - Given andexxa  -PT/OT plan for HOLLY    paroxysmal afib  HTN, HLD   CAD   -cont metoprolol, diltiazem BID   -cont statin   -cont prn labetalol, hydralazine   -Can resume asa on 12/23 per NS - Recommend against restarting of AC given 2 seperate bleeds    Acute LUE DVT   -US LUE: stable partially occlusive L axillary vein thrombosis and L cephalic vein superficial thrombophlebitis; segmental occlusive thrombosis within 1 of 2 brachial veins AV fistula surrounded by thrombosis vs pseudoaneurysm   -Ac has to be held  -avoid heparin products 2/2 hx of HIT   -BLE SCDs - Started on Fondaparinax    Normocytic anemia   -cont to monitor   -f/u am cbc   -monitor for s/sx active bleeding     AAA s/p EVAR   -imaging reviewed by vascular sx   -no intervention planned at present   -antiplatelets not required for EVAR as per vascular     DONNIE resolved   urinary retention   -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

## 2023-12-17 NOTE — PROGRESS NOTE ADULT - ASSESSMENT
83M w/ PMH HTN, CAD s/p stents on ASA/Plavix, RCC s/p L nephrectomy, bladder CA, BPH, CHF, LBBB, vertigo, HLD, 5.5 cm AAA without rupture post EVAR, paroxysmal afib on Eliquis, early stage Alzheimer's dementia, presented to [facility] 11/10/23 s/p unwitnessed fall with head strike at home found by wife, found with multicompartmental ICH, s/p R craniectomy 11/10/23, course significant for Klebsiella pneumonia, hypernatremia, lethargy, LUE DVT, new anterior falcine SDH.  s/p R cranioplasty POD#10  -CTH s/p starting eliquis showed new R frontal ICH. 4hr repeat CTH showed worsening R frontal ICH.     Plan    - DG to SDU vs Tele in AM, discuss in AM rounds  - Q2h neuro checks  - Eliquis reversed w/ Andexxa  - Repeat CTH stable  - Continue to hold Eliquis and ASA, unlikely to be able to restart Eliquis in the future given high risk of rebleed  - Pain control PRN; avoid oversedation. Tylenol Oxy 5/10   - Keppra 500 BID  - HOB 30 degrees   - SBP < 160  - Continue PO meds; Amantadine 150 QAM, Memantine 5 BID, Provigil 100, Lipitor 80, FA, Melatonin, Nephro-roma  - Record BMs; Miralax, Senna  - PT/OT  - Wound: Permitted to daily showers using gentle soap near incision, pat dry, leave open to air.   - Further medical management per NeuroICU  - D/w Dr. Larios   83M w/ PMH HTN, CAD s/p stents on ASA/Plavix, RCC s/p L nephrectomy, bladder CA, BPH, CHF, LBBB, vertigo, HLD, 5.5 cm AAA without rupture post EVAR, paroxysmal afib on Eliquis, early stage Alzheimer's dementia, presented to [facility] 11/10/23 s/p unwitnessed fall with head strike at home found by wife, found with multicompartmental ICH, s/p R craniectomy 11/10/23, course significant for Klebsiella pneumonia, hypernatremia, lethargy, LUE DVT, new anterior falcine SDH.  s/p R cranioplasty POD#10  -CTH s/p starting eliquis showed new R frontal ICH. 4hr repeat CTH showed worsening R frontal ICH.     Plan    - DG to SDU vs Tele in AM, discuss in AM rounds  - Q2h neuro checks  - Eliquis reversed w/ Andexxa  - Repeat CTH stable  - Continue to hold Eliquis and ASA, unlikely to be able to restart Eliquis in the future given high risk of rebleed  - Pain control PRN; avoid oversedation. Tylenol Oxy 5/10   - Keppra 500 BID  - HOB 30 degrees   - SBP < 160  - Continue PO meds; Amantadine 150 QAM, Memantine 5 BID, Provigil 100, Lipitor 80, FA, Melatonin, Nephro-roma  - Record BMs; Miralax, Senna  - PT/OT  - Wound: Permitted to daily showers using gentle soap near incision, pat dry, leave open to air.   - Further medical management per NeuroICU  - D/w Dr. Larios

## 2023-12-17 NOTE — CHART NOTE - NSCHARTNOTEFT_GEN_A_CORE
HPI:  82y M with PMH HTN, CAD s/p stents, renal cell carcinoma status post left nephrectomy, bladder CA, BPH, CHF, LBBB, vertigo,  HLD, 5.5cm AAA without rupture post EVAR, paroxysmal A-fib on Eliquis, Plavix, and aspirin, early stage Alzheimer dementia transferred from [facility] s/p unwitnessed fall with head strike at home found by wife on floor at 8am. Patient brought to urgent care by wife and had 5 staples to posterior scalp but was instructed to go to nearest ED.  CT head at [place] showed R frontal IPH, SDH, SAH at 9:00. CTA stroke protocol not performed at [facility]. Patient BIB on 6L NC.  Pt GCS 15 on arrival, A&Ox2 on arrival. After initial assessment, patient noted to be vomiting enroute to CT scanner.    (10 Nov 2023 11:04)      Int Events:  11/10 admitted, s/p right hemicraniectomy   11/26 new acute anterior falx SDH after Eliquis restarted  12/7 s/p Right cranioplasty  12/9 TOV failed, hall replaced  12/10 surgical drain d/c'd, transferred to medicine service  12/14 Eliquis and ASA restarted  12/16 new increase of R frontal ICH on CTH after Eliquis restarted again, no change in exam, s/p Andexxa for reversal, repeat CTH stable      Vital Signs Last 24 Hrs  T(C): 36.9 (12-17-23 @ 13:00), Max: 37.4 (12-16-23 @ 21:00)  T(F): 98.4 (12-17-23 @ 13:00), Max: 99.3 (12-16-23 @ 21:00)  HR: 81 (12-17-23 @ 13:00) (70 - 107)  BP: 124/60 (12-17-23 @ 13:00) (93/56 - 159/76)  BP(mean): 76 (12-17-23 @ 13:00) (67 - 113)  RR: 14 (12-17-23 @ 13:00) (12 - 23)  SpO2: 100% (12-17-23 @ 13:00) (97% - 100%)    PHYSICAL EXAM:    GENERAL: NAD  HEAD/INCISION:  S/p R crani. Mild R temporal edema 2/2 crani site, small extra-axial fluid collection felt and soft, some drainage seen on tony by incision but no active leaking seen, incision c/d/i +sutures  NEURO: Eo spont, AAO x2 (name and place, intermittently year), answers most questions, EOMI, PERRL, mild L facial, following simple commands on R, RUE 4+/5, RLE 2/5, LUE/LLE trace WDs   SENSATION: dec L sided sensation. grossly intact on R sided  SKIN: Warm, dry            LABS:                        8.4    9.20  )-----------( 152      ( 17 Dec 2023 04:20 )             24.7    12-17    137  |  103  |  15.4  ----------------------------<  117<H>  4.3   |  20.0<L>  |  0.74    Ca    8.6      17 Dec 2023 04:20  Phos  3.9     12-17  Mg     1.5     12-17        IMAGING: < from: CT Head No Cont (12.16.23 @ 11:41) >    FINDINGS:  The medial right frontal lobe intraparenchymal hematoma measures   approximately 2.1 x 2.3 x 2.7 cm, not significantly changed. No   significant midline shift.    Similar smaller right frontal lobe hemorrhages. Similar moderate focus of   loss of gray-white differentiation in the right frontal lobe.    Similar heterogeneous right hemispheric extra-axial collection.    There is a 2 mm maximal thickness posterior parafalcine subdural   hematoma, not significantly changed.    Similar dependent blood in the occipital horns of the lateral ventricles.    Similar bifrontal pneumocephalus.    No findings suspicious for new acute large vascular territory infarct.   Severe hypoattenuation of the periventricular/deep white matter most   consistent with chronic microvascular ischemic disease. Moderate diffuse   cerebral volume loss.    Similar right frontal/temporal/parietal cranioplasty changes with a   similar size hypodense fluid collection superficial of the cranioplasty   site.    No acute fracture.      Paranasal Sinuses, Mastoid Air Cells, and Orbits: Visualized paranasal   sinuses are clear. Visualized mastoid air cells clear. Orbits are   unremarkable.  ______________________  IMPRESSION:  1.  No significant interval change.    --- End of Report ---    < end of copied text >        MEDICATIONS:  Antibiotics:    Neuro:  acetaminophen     Tablet .. 650 milliGRAM(s) Oral every 6 hours PRN Temp greater or equal to 38C (100.4F), Mild Pain (1 - 3)  levETIRAcetam  IVPB 500 milliGRAM(s) IV Intermittent every 12 hours  melatonin 5 milliGRAM(s) Oral at bedtime  memantine 5 milliGRAM(s) Oral two times a day  modafinil 100 milliGRAM(s) Oral <User Schedule>  ondansetron Injectable 4 milliGRAM(s) IV Push every 6 hours PRN Nausea and/or Vomiting    Cardiac:  diltiazem    Tablet 60 milliGRAM(s) Oral <User Schedule>  doxazosin 2 milliGRAM(s) Oral at bedtime  hydrALAZINE Injectable 10 milliGRAM(s) IV Push every 2 hours PRN SBP > 160 mmHg  labetalol Injectable 10 milliGRAM(s) IV Push every 2 hours PRN Systolic blood pressure > 160  metoprolol tartrate 50 milliGRAM(s) Oral <User Schedule>    Pulm:    GI/:  polyethylene glycol 3350 17 Gram(s) Oral daily  senna 2 Tablet(s) Oral at bedtime    Other:   artificial tears (preservative free) Ophthalmic Solution 1 Drop(s) Both EYES every 6 hours PRN Dry Eyes  atorvastatin 80 milliGRAM(s) Oral at bedtime  chlorhexidine 2% Cloths 1 Application(s) Topical <User Schedule>  enoxaparin Injectable 40 milliGRAM(s) SubCutaneous every 24 hours  folic acid 1 milliGRAM(s) Oral daily  Nephro-roma 1 Tablet(s) Oral daily    -------------------------------------------------------------------------------------------------------------  PLAN:83M w/ PMH HTN, CAD s/p stents on ASA/Plavix, RCC s/p L nephrectomy, bladder CA, BPH, CHF, LBBB, vertigo, HLD, 5.5 cm AAA without rupture post EVAR, paroxysmal afib on Eliquis, early stage Alzheimer's dementia, presented to [facility] 11/10/23 s/p unwitnessed fall with head strike at home found by wife, found with multicompartmental ICH, s/p R craniectomy 11/10/23, course significant for Klebsiella pneumonia, hypernatremia, lethargy, LUE DVT, new anterior falcine SDH, and most recently increased R frontal ICH after restarting Eliquis.    Neuro:   - Q  4 hr neuro checks   - CTH stable 12/16  - continue keppra 500 Q12hr for seizure ppx  - continue Provigil and Namenda  - continue melatonin 5 qhs  - pain control with prn tylenol  - monitor incision, neurosurgery following    Respiratory:   - Room Air  - encourage incentive spirometry    CV:  - SBP goal 100-160  - continue lipitor  - continue cardizem and metoprolol for rate control  - PRN: hydralazine and labetalol IVP     Renal:   - IVL   - hall (replaced 12/9 due to retention)  - continue cardura for retention  - Replete electrolytes as needed    GI:    - minced and moist DASH diet  - Bowel Regimen: miralax, senna  - LBM:  - GI PPX:    Endocrine:   - ISS    Heme/Onc:               - DVT ppx: venodynes on, fondaparinux restarted for 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 Styles and Dr. Baltazar HPI:  82y M with PMH HTN, CAD s/p stents, renal cell carcinoma status post left nephrectomy, bladder CA, BPH, CHF, LBBB, vertigo,  HLD, 5.5cm AAA without rupture post EVAR, paroxysmal A-fib on Eliquis, Plavix, and aspirin, early stage Alzheimer dementia transferred from [facility] [place] s/p unwitnessed fall with head strike at home found by wife on floor at 8am. Patient brought to urgent care by wife and had 5 staples to posterior scalp but was instructed to go to nearest ED.  CT head at [place] showed R frontal IPH, SDH, SAH at 9:00. CTA stroke protocol not performed at [facility] [place]. Patient BIB on 6L NC.  Pt GCS 15 on arrival, A&Ox2 on arrival. After initial assessment, patient noted to be vomiting enroute to CT scanner.    (10 Nov 2023 11:04)      Int Events:  11/10 admitted, s/p right hemicraniectomy   11/26 new acute anterior falx SDH after Eliquis restarted  12/7 s/p Right cranioplasty  12/9 TOV failed, hall replaced  12/10 surgical drain d/c'd, transferred to medicine service  12/14 Eliquis and ASA restarted  12/16 new increase of R frontal ICH on CTH after Eliquis restarted again, no change in exam, s/p Andexxa for reversal, repeat CTH stable      Vital Signs Last 24 Hrs  T(C): 36.9 (12-17-23 @ 13:00), Max: 37.4 (12-16-23 @ 21:00)  T(F): 98.4 (12-17-23 @ 13:00), Max: 99.3 (12-16-23 @ 21:00)  HR: 81 (12-17-23 @ 13:00) (70 - 107)  BP: 124/60 (12-17-23 @ 13:00) (93/56 - 159/76)  BP(mean): 76 (12-17-23 @ 13:00) (67 - 113)  RR: 14 (12-17-23 @ 13:00) (12 - 23)  SpO2: 100% (12-17-23 @ 13:00) (97% - 100%)    PHYSICAL EXAM:    GENERAL: NAD  HEAD/INCISION:  S/p R crani. Mild R temporal edema 2/2 crani site, small extra-axial fluid collection felt and soft, some drainage seen on tony by incision but no active leaking seen, incision c/d/i +sutures  NEURO: Eo spont, AAO x2 (name and place, intermittently year), answers most questions, EOMI, PERRL, mild L facial, following simple commands on R, RUE 4+/5, RLE 2/5, LUE/LLE trace WDs   SENSATION: dec L sided sensation. grossly intact on R sided  SKIN: Warm, dry            LABS:                        8.4    9.20  )-----------( 152      ( 17 Dec 2023 04:20 )             24.7    12-17    137  |  103  |  15.4  ----------------------------<  117<H>  4.3   |  20.0<L>  |  0.74    Ca    8.6      17 Dec 2023 04:20  Phos  3.9     12-17  Mg     1.5     12-17        IMAGING: < from: CT Head No Cont (12.16.23 @ 11:41) >    FINDINGS:  The medial right frontal lobe intraparenchymal hematoma measures   approximately 2.1 x 2.3 x 2.7 cm, not significantly changed. No   significant midline shift.    Similar smaller right frontal lobe hemorrhages. Similar moderate focus of   loss of gray-white differentiation in the right frontal lobe.    Similar heterogeneous right hemispheric extra-axial collection.    There is a 2 mm maximal thickness posterior parafalcine subdural   hematoma, not significantly changed.    Similar dependent blood in the occipital horns of the lateral ventricles.    Similar bifrontal pneumocephalus.    No findings suspicious for new acute large vascular territory infarct.   Severe hypoattenuation of the periventricular/deep white matter most   consistent with chronic microvascular ischemic disease. Moderate diffuse   cerebral volume loss.    Similar right frontal/temporal/parietal cranioplasty changes with a   similar size hypodense fluid collection superficial of the cranioplasty   site.    No acute fracture.      Paranasal Sinuses, Mastoid Air Cells, and Orbits: Visualized paranasal   sinuses are clear. Visualized mastoid air cells clear. Orbits are   unremarkable.  ______________________  IMPRESSION:  1.  No significant interval change.    --- End of Report ---    < end of copied text >        MEDICATIONS:  Antibiotics:    Neuro:  acetaminophen     Tablet .. 650 milliGRAM(s) Oral every 6 hours PRN Temp greater or equal to 38C (100.4F), Mild Pain (1 - 3)  levETIRAcetam  IVPB 500 milliGRAM(s) IV Intermittent every 12 hours  melatonin 5 milliGRAM(s) Oral at bedtime  memantine 5 milliGRAM(s) Oral two times a day  modafinil 100 milliGRAM(s) Oral <User Schedule>  ondansetron Injectable 4 milliGRAM(s) IV Push every 6 hours PRN Nausea and/or Vomiting    Cardiac:  diltiazem    Tablet 60 milliGRAM(s) Oral <User Schedule>  doxazosin 2 milliGRAM(s) Oral at bedtime  hydrALAZINE Injectable 10 milliGRAM(s) IV Push every 2 hours PRN SBP > 160 mmHg  labetalol Injectable 10 milliGRAM(s) IV Push every 2 hours PRN Systolic blood pressure > 160  metoprolol tartrate 50 milliGRAM(s) Oral <User Schedule>    Pulm:    GI/:  polyethylene glycol 3350 17 Gram(s) Oral daily  senna 2 Tablet(s) Oral at bedtime    Other:   artificial tears (preservative free) Ophthalmic Solution 1 Drop(s) Both EYES every 6 hours PRN Dry Eyes  atorvastatin 80 milliGRAM(s) Oral at bedtime  chlorhexidine 2% Cloths 1 Application(s) Topical <User Schedule>  enoxaparin Injectable 40 milliGRAM(s) SubCutaneous every 24 hours  folic acid 1 milliGRAM(s) Oral daily  Nephro-roma 1 Tablet(s) Oral daily    -------------------------------------------------------------------------------------------------------------  PLAN:83M w/ PMH HTN, CAD s/p stents on ASA/Plavix, RCC s/p L nephrectomy, bladder CA, BPH, CHF, LBBB, vertigo, HLD, 5.5 cm AAA without rupture post EVAR, paroxysmal afib on Eliquis, early stage Alzheimer's dementia, presented to [facility] [place] 11/10/23 s/p unwitnessed fall with head strike at home found by wife, found with multicompartmental ICH, s/p R craniectomy 11/10/23, course significant for Klebsiella pneumonia, hypernatremia, lethargy, LUE DVT, new anterior falcine SDH, and most recently increased R frontal ICH after restarting Eliquis.    Neuro:   - Q  4 hr neuro checks   - CTH stable 12/16  - continue keppra 500 Q12hr for seizure ppx  - continue Provigil and Namenda  - continue melatonin 5 qhs  - pain control with prn tylenol  - monitor incision, neurosurgery following    Respiratory:   - Room Air  - encourage incentive spirometry    CV:  - SBP goal 100-160  - continue lipitor  - continue cardizem and metoprolol for rate control  - PRN: hydralazine and labetalol IVP     Renal:   - IVL   - hall (replaced 12/9 due to retention)  - continue cardura for retention  - Replete electrolytes as needed    GI:    - minced and moist DASH diet  - Bowel Regimen: miralax, senna  - LBM:  - GI PPX:    Endocrine:   - ISS    Heme/Onc:               - DVT ppx: venodynes on, fondaparinux restarted for 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 Styles and Dr. Baltazar HPI:  82y M with PMH HTN, CAD s/p stents, renal cell carcinoma status post left nephrectomy, bladder CA, BPH, CHF, LBBB, vertigo,  HLD, 5.5cm AAA without rupture post EVAR, paroxysmal A-fib on Eliquis, Plavix, and aspirin, early stage Alzheimer dementia transferred from [facility] s/p unwitnessed fall with head strike at home found by wife on floor at 8am. Patient brought to urgent care by wife and had 5 staples to posterior scalp but was instructed to go to nearest ED.  CT head at [place] showed R frontal IPH, SDH, SAH at 9:00. CTA stroke protocol not performed at [facility]. Patient BIB on 6L NC.  Pt GCS 15 on arrival, A&Ox2 on arrival. After initial assessment, patient noted to be vomiting enroute to CT scanner.    (10 Nov 2023 11:04)      Int Events:  11/10 admitted, s/p right hemicraniectomy   11/26 new acute anterior falx SDH after Eliquis restarted  12/7 s/p Right cranioplasty  12/9 TOV failed, hall replaced  12/10 surgical drain d/c'd, transferred to medicine service  12/14 Eliquis and ASA restarted  12/16 new increase of R frontal ICH on CTH after Eliquis restarted again, no change in exam, s/p Andexxa for reversal, repeat CTH stable      Vital Signs Last 24 Hrs  T(C): 36.9 (12-17-23 @ 13:00), Max: 37.4 (12-16-23 @ 21:00)  T(F): 98.4 (12-17-23 @ 13:00), Max: 99.3 (12-16-23 @ 21:00)  HR: 81 (12-17-23 @ 13:00) (70 - 107)  BP: 124/60 (12-17-23 @ 13:00) (93/56 - 159/76)  BP(mean): 76 (12-17-23 @ 13:00) (67 - 113)  RR: 14 (12-17-23 @ 13:00) (12 - 23)  SpO2: 100% (12-17-23 @ 13:00) (97% - 100%)    PHYSICAL EXAM:    GENERAL: NAD  HEAD/INCISION:  S/p R crani. Mild R temporal edema 2/2 crani site, small extra-axial fluid collection felt and soft, some drainage seen on tony by incision but no active leaking seen, incision c/d/i +sutures  NEURO: Eo spont, AAO x2 (name and place, intermittently year), answers most questions, EOMI, PERRL, mild L facial, following simple commands on R, RUE 4+/5, RLE 2/5, LUE/LLE trace WDs   SENSATION: dec L sided sensation. grossly intact on R sided  SKIN: Warm, dry            LABS:                        8.4    9.20  )-----------( 152      ( 17 Dec 2023 04:20 )             24.7    12-17    137  |  103  |  15.4  ----------------------------<  117<H>  4.3   |  20.0<L>  |  0.74    Ca    8.6      17 Dec 2023 04:20  Phos  3.9     12-17  Mg     1.5     12-17        IMAGING: < from: CT Head No Cont (12.16.23 @ 11:41) >    FINDINGS:  The medial right frontal lobe intraparenchymal hematoma measures   approximately 2.1 x 2.3 x 2.7 cm, not significantly changed. No   significant midline shift.    Similar smaller right frontal lobe hemorrhages. Similar moderate focus of   loss of gray-white differentiation in the right frontal lobe.    Similar heterogeneous right hemispheric extra-axial collection.    There is a 2 mm maximal thickness posterior parafalcine subdural   hematoma, not significantly changed.    Similar dependent blood in the occipital horns of the lateral ventricles.    Similar bifrontal pneumocephalus.    No findings suspicious for new acute large vascular territory infarct.   Severe hypoattenuation of the periventricular/deep white matter most   consistent with chronic microvascular ischemic disease. Moderate diffuse   cerebral volume loss.    Similar right frontal/temporal/parietal cranioplasty changes with a   similar size hypodense fluid collection superficial of the cranioplasty   site.    No acute fracture.      Paranasal Sinuses, Mastoid Air Cells, and Orbits: Visualized paranasal   sinuses are clear. Visualized mastoid air cells clear. Orbits are   unremarkable.  ______________________  IMPRESSION:  1.  No significant interval change.    --- End of Report ---    < end of copied text >        MEDICATIONS:  Antibiotics:    Neuro:  acetaminophen     Tablet .. 650 milliGRAM(s) Oral every 6 hours PRN Temp greater or equal to 38C (100.4F), Mild Pain (1 - 3)  levETIRAcetam  IVPB 500 milliGRAM(s) IV Intermittent every 12 hours  melatonin 5 milliGRAM(s) Oral at bedtime  memantine 5 milliGRAM(s) Oral two times a day  modafinil 100 milliGRAM(s) Oral <User Schedule>  ondansetron Injectable 4 milliGRAM(s) IV Push every 6 hours PRN Nausea and/or Vomiting    Cardiac:  diltiazem    Tablet 60 milliGRAM(s) Oral <User Schedule>  doxazosin 2 milliGRAM(s) Oral at bedtime  hydrALAZINE Injectable 10 milliGRAM(s) IV Push every 2 hours PRN SBP > 160 mmHg  labetalol Injectable 10 milliGRAM(s) IV Push every 2 hours PRN Systolic blood pressure > 160  metoprolol tartrate 50 milliGRAM(s) Oral <User Schedule>    Pulm:    GI/:  polyethylene glycol 3350 17 Gram(s) Oral daily  senna 2 Tablet(s) Oral at bedtime    Other:   artificial tears (preservative free) Ophthalmic Solution 1 Drop(s) Both EYES every 6 hours PRN Dry Eyes  atorvastatin 80 milliGRAM(s) Oral at bedtime  chlorhexidine 2% Cloths 1 Application(s) Topical <User Schedule>  enoxaparin Injectable 40 milliGRAM(s) SubCutaneous every 24 hours  folic acid 1 milliGRAM(s) Oral daily  Nephro-roma 1 Tablet(s) Oral daily    -------------------------------------------------------------------------------------------------------------  PLAN: 83M w/ PMH HTN, CAD s/p stents on ASA/Plavix, RCC s/p L nephrectomy, bladder CA, BPH, CHF, LBBB, vertigo, HLD, 5.5 cm AAA without rupture post EVAR, paroxysmal afib on Eliquis, early stage Alzheimer's dementia, presented to [facility] 11/10/23 s/p unwitnessed fall with head strike at home found by wife, found with multicompartmental ICH, s/p R craniectomy 11/10/23, course significant for Klebsiella pneumonia, hypernatremia, lethargy, LUE DVT, new anterior falcine SDH, and most recently increased R frontal ICH after restarting Eliquis.    Neuro:   - Q  4 hr neuro checks   - CTH stable 12/16  - continue keppra 500 Q12hr for seizure ppx  - continue Provigil and Namenda  - continue melatonin 5 qhs  - pain control with prn tylenol  - monitor incision, neurosurgery following    Respiratory:   - Room Air  - encourage incentive spirometry    CV:  - SBP goal 100-160  - continue lipitor  - continue cardizem and metoprolol for rate control  - PRN: hydralazine and labetalol IVP   - vascular and cardio consults, appreciate recs    Renal:   - IVL   - hall (replaced 12/9 due to retention)  - continue cardura for retention  - Replete electrolytes as needed    GI:    - minced and moist DASH diet  - Bowel Regimen: miralax, senna    Endocrine:   - ISS    Heme/Onc:               - DVT ppx: venodynes on, fondaparinux restarted for DVT 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 Yanes  Case discussed with Dr. Styles and Dr. Baltazar HPI:  82y M with PMH HTN, CAD s/p stents, renal cell carcinoma status post left nephrectomy, bladder CA, BPH, CHF, LBBB, vertigo,  HLD, 5.5cm AAA without rupture post EVAR, paroxysmal A-fib on Eliquis, Plavix, and aspirin, early stage Alzheimer dementia transferred from [facility] s/p unwitnessed fall with head strike at home found by wife on floor at 8am. Patient brought to urgent care by wife and had 5 staples to posterior scalp but was instructed to go to nearest ED.  CT head at [place] showed R frontal IPH, SDH, SAH at 9:00. CTA stroke protocol not performed at [facility]. Patient BIB on 6L NC.  Pt GCS 15 on arrival, A&Ox2 on arrival. After initial assessment, patient noted to be vomiting enroute to CT scanner.    (10 Nov 2023 11:04)      Int Events:  11/10 admitted, s/p right hemicraniectomy   11/26 new acute anterior falx SDH after Eliquis restarted  12/7 s/p Right cranioplasty  12/9 TOV failed, hall replaced  12/10 surgical drain d/c'd, transferred to medicine service  12/14 Eliquis and ASA restarted  12/16 new increase of R frontal ICH on CTH after Eliquis restarted again, no change in exam, s/p Andexxa for reversal, repeat CTH stable      Vital Signs Last 24 Hrs  T(C): 36.9 (12-17-23 @ 13:00), Max: 37.4 (12-16-23 @ 21:00)  T(F): 98.4 (12-17-23 @ 13:00), Max: 99.3 (12-16-23 @ 21:00)  HR: 81 (12-17-23 @ 13:00) (70 - 107)  BP: 124/60 (12-17-23 @ 13:00) (93/56 - 159/76)  BP(mean): 76 (12-17-23 @ 13:00) (67 - 113)  RR: 14 (12-17-23 @ 13:00) (12 - 23)  SpO2: 100% (12-17-23 @ 13:00) (97% - 100%)    PHYSICAL EXAM:    GENERAL: NAD  HEAD/INCISION:  S/p R crani. Mild R temporal edema 2/2 crani site, small extra-axial fluid collection felt and soft, some drainage seen on tony by incision but no active leaking seen, incision c/d/i +sutures  NEURO: Eo spont, AAO x2 (name and place, intermittently year), answers most questions, EOMI, PERRL, mild L facial, following simple commands on R, RUE 4+/5, RLE 2/5, LUE/LLE trace WDs   SENSATION: dec L sided sensation. grossly intact on R sided  SKIN: Warm, dry            LABS:                        8.4    9.20  )-----------( 152      ( 17 Dec 2023 04:20 )             24.7    12-17    137  |  103  |  15.4  ----------------------------<  117<H>  4.3   |  20.0<L>  |  0.74    Ca    8.6      17 Dec 2023 04:20  Phos  3.9     12-17  Mg     1.5     12-17        IMAGING: < from: CT Head No Cont (12.16.23 @ 11:41) >    FINDINGS:  The medial right frontal lobe intraparenchymal hematoma measures   approximately 2.1 x 2.3 x 2.7 cm, not significantly changed. No   significant midline shift.    Similar smaller right frontal lobe hemorrhages. Similar moderate focus of   loss of gray-white differentiation in the right frontal lobe.    Similar heterogeneous right hemispheric extra-axial collection.    There is a 2 mm maximal thickness posterior parafalcine subdural   hematoma, not significantly changed.    Similar dependent blood in the occipital horns of the lateral ventricles.    Similar bifrontal pneumocephalus.    No findings suspicious for new acute large vascular territory infarct.   Severe hypoattenuation of the periventricular/deep white matter most   consistent with chronic microvascular ischemic disease. Moderate diffuse   cerebral volume loss.    Similar right frontal/temporal/parietal cranioplasty changes with a   similar size hypodense fluid collection superficial of the cranioplasty   site.    No acute fracture.      Paranasal Sinuses, Mastoid Air Cells, and Orbits: Visualized paranasal   sinuses are clear. Visualized mastoid air cells clear. Orbits are   unremarkable.  ______________________  IMPRESSION:  1.  No significant interval change.    --- End of Report ---    < end of copied text >        MEDICATIONS:  Antibiotics:    Neuro:  acetaminophen     Tablet .. 650 milliGRAM(s) Oral every 6 hours PRN Temp greater or equal to 38C (100.4F), Mild Pain (1 - 3)  levETIRAcetam  IVPB 500 milliGRAM(s) IV Intermittent every 12 hours  melatonin 5 milliGRAM(s) Oral at bedtime  memantine 5 milliGRAM(s) Oral two times a day  modafinil 100 milliGRAM(s) Oral <User Schedule>  ondansetron Injectable 4 milliGRAM(s) IV Push every 6 hours PRN Nausea and/or Vomiting    Cardiac:  diltiazem    Tablet 60 milliGRAM(s) Oral <User Schedule>  doxazosin 2 milliGRAM(s) Oral at bedtime  hydrALAZINE Injectable 10 milliGRAM(s) IV Push every 2 hours PRN SBP > 160 mmHg  labetalol Injectable 10 milliGRAM(s) IV Push every 2 hours PRN Systolic blood pressure > 160  metoprolol tartrate 50 milliGRAM(s) Oral <User Schedule>    Pulm:    GI/:  polyethylene glycol 3350 17 Gram(s) Oral daily  senna 2 Tablet(s) Oral at bedtime    Other:   artificial tears (preservative free) Ophthalmic Solution 1 Drop(s) Both EYES every 6 hours PRN Dry Eyes  atorvastatin 80 milliGRAM(s) Oral at bedtime  chlorhexidine 2% Cloths 1 Application(s) Topical <User Schedule>  enoxaparin Injectable 40 milliGRAM(s) SubCutaneous every 24 hours  folic acid 1 milliGRAM(s) Oral daily  Nephro-roma 1 Tablet(s) Oral daily    -------------------------------------------------------------------------------------------------------------  PLAN: 83M w/ PMH HTN, CAD s/p stents on ASA/Plavix, RCC s/p L nephrectomy, bladder CA, BPH, CHF, LBBB, vertigo, HLD, 5.5 cm AAA without rupture post EVAR, paroxysmal afib on Eliquis, early stage Alzheimer's dementia, presented to [facility] 11/10/23 s/p unwitnessed fall with head strike at home found by wife, found with multicompartmental ICH, s/p R craniectomy 11/10/23, course significant for Klebsiella pneumonia, hypernatremia, lethargy, LUE DVT, new anterior falcine SDH, and most recently increased R frontal ICH after restarting Eliquis.    Neuro:   - Q  4 hr neuro checks   - CTH stable 12/16  - continue keppra 500 Q12hr for seizure ppx  - continue Provigil and Namenda  - continue melatonin 5 qhs  - pain control with prn tylenol  - monitor incision, neurosurgery following    Respiratory:   - Room Air  - encourage incentive spirometry    CV:  - SBP goal 100-160  - continue lipitor  - continue cardizem and metoprolol for rate control  - PRN: hydralazine and labetalol IVP   - vascular and cardio consults, appreciate recs    Renal:   - IVL   - hall (replaced 12/9 due to retention)  - continue cardura for retention  - Replete electrolytes as needed    GI:    - minced and moist DASH diet  - Bowel Regimen: miralax, senna    Endocrine:   - ISS    Heme/Onc:               - DVT ppx: venodynes on, fondaparinux restarted for DVT 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 Yanes  Case discussed with Dr. Styles and Dr. Baltazar

## 2023-12-17 NOTE — PROGRESS NOTE ADULT - SUBJECTIVE AND OBJECTIVE BOX
Hospitalist Daily Progress Note    Chief Complaint:  Patient is a 83y old  Male who presents with a chief complaint of s/p unwitnessed fall with head strike (17 Dec 2023 02:51)      SUBJECTIVE / OVERNIGHT EVENTS:  Patient was seen and examined at bedside in ICU.  No complaints.   Plan to be transferred out of ICU.     MEDICATIONS  (STANDING):  atorvastatin 80 milliGRAM(s) Oral at bedtime  chlorhexidine 2% Cloths 1 Application(s) Topical <User Schedule>  diltiazem    Tablet 60 milliGRAM(s) Oral <User Schedule>  doxazosin 2 milliGRAM(s) Oral at bedtime  folic acid 1 milliGRAM(s) Oral daily  fondaparinux Injectable 2.5 milliGRAM(s) SubCutaneous <User Schedule>  levETIRAcetam  IVPB 500 milliGRAM(s) IV Intermittent every 12 hours  melatonin 5 milliGRAM(s) Oral at bedtime  memantine 5 milliGRAM(s) Oral two times a day  metoprolol tartrate 50 milliGRAM(s) Oral <User Schedule>  modafinil 100 milliGRAM(s) Oral <User Schedule>  Nephro-roma 1 Tablet(s) Oral daily  polyethylene glycol 3350 17 Gram(s) Oral daily  senna 2 Tablet(s) Oral at bedtime    MEDICATIONS  (PRN):  acetaminophen     Tablet .. 650 milliGRAM(s) Oral every 6 hours PRN Temp greater or equal to 38C (100.4F), Mild Pain (1 - 3)  artificial tears (preservative free) Ophthalmic Solution 1 Drop(s) Both EYES every 6 hours PRN Dry Eyes  hydrALAZINE Injectable 10 milliGRAM(s) IV Push every 2 hours PRN SBP > 160 mmHg  labetalol Injectable 10 milliGRAM(s) IV Push every 2 hours PRN Systolic blood pressure > 160  ondansetron Injectable 4 milliGRAM(s) IV Push every 6 hours PRN Nausea and/or Vomiting        I&O's Summary    16 Dec 2023 07:01  -  17 Dec 2023 07:00  --------------------------------------------------------  IN: 775 mL / OUT: 1475 mL / NET: -700 mL    17 Dec 2023 07:01  -  17 Dec 2023 15:01  --------------------------------------------------------  IN: 750 mL / OUT: 605 mL / NET: 145 mL        PHYSICAL EXAM:  Vital Signs Last 24 Hrs  T(C): 36.9 (17 Dec 2023 13:00), Max: 37.4 (16 Dec 2023 21:00)  T(F): 98.4 (17 Dec 2023 13:00), Max: 99.3 (16 Dec 2023 21:00)  HR: 81 (17 Dec 2023 13:00) (70 - 107)  BP: 124/60 (17 Dec 2023 13:00) (93/56 - 159/76)  BP(mean): 76 (17 Dec 2023 13:00) (67 - 113)  RR: 14 (17 Dec 2023 13:00) (12 - 23)  SpO2: 100% (17 Dec 2023 13:00) (97% - 100%)    Parameters below as of 17 Dec 2023 12:00  Patient On (Oxygen Delivery Method): room air          Constitutional: NAD, Resting, right sided head scar with fluctuance noted   ENT: Supple, No JVD  Lungs: CTA B/L, Non-labored breathing  Cardio: RRR, S1/S2, No murmur  Abdomen: Soft, Nontender, Nondistended; Bowel sounds present, [place] in place  Extremities: No calf tenderness, No pitting edema  Musculoskeletal:   No joint swelling  Psych: Calm, cooperative    Neuro: Awake and alert, oriented to name, drowsy, RUE is 4/5 and RLE is 2-3/5, LUE/LLE withdrawal to pain  Skin: No rashes; no palpable lesions    LABS:                        8.4    9.20  )-----------( 152      ( 17 Dec 2023 04:20 )             24.7     12-17    137  |  103  |  15.4  ----------------------------<  117<H>  4.3   |  20.0<L>  |  0.74    Ca    8.6      17 Dec 2023 04:20  Phos  3.9     12-17  Mg     1.5     12-17            Urinalysis Basic - ( 17 Dec 2023 04:20 )    Color: x / Appearance: x / SG: x / pH: x  Gluc: 117 mg/dL / Ketone: x  / Bili: x / Urobili: x   Blood: x / Protein: x / Nitrite: x   Leuk Esterase: x / RBC: x / WBC x   Sq Epi: x / Non Sq Epi: x / Bacteria: x        CAPILLARY BLOOD GLUCOSE            RADIOLOGY REVIEWED

## 2023-12-18 LAB
ANION GAP SERPL CALC-SCNC: 14 MMOL/L — SIGNIFICANT CHANGE UP (ref 5–17)
ANION GAP SERPL CALC-SCNC: 14 MMOL/L — SIGNIFICANT CHANGE UP (ref 5–17)
BASOPHILS # BLD AUTO: 0.05 K/UL — SIGNIFICANT CHANGE UP (ref 0–0.2)
BASOPHILS # BLD AUTO: 0.05 K/UL — SIGNIFICANT CHANGE UP (ref 0–0.2)
BASOPHILS NFR BLD AUTO: 0.7 % — SIGNIFICANT CHANGE UP (ref 0–2)
BASOPHILS NFR BLD AUTO: 0.7 % — SIGNIFICANT CHANGE UP (ref 0–2)
BUN SERPL-MCNC: 16.5 MG/DL — SIGNIFICANT CHANGE UP (ref 8–20)
BUN SERPL-MCNC: 16.5 MG/DL — SIGNIFICANT CHANGE UP (ref 8–20)
CALCIUM SERPL-MCNC: 9 MG/DL — SIGNIFICANT CHANGE UP (ref 8.4–10.5)
CALCIUM SERPL-MCNC: 9 MG/DL — SIGNIFICANT CHANGE UP (ref 8.4–10.5)
CHLORIDE SERPL-SCNC: 107 MMOL/L — SIGNIFICANT CHANGE UP (ref 96–108)
CHLORIDE SERPL-SCNC: 107 MMOL/L — SIGNIFICANT CHANGE UP (ref 96–108)
CO2 SERPL-SCNC: 20 MMOL/L — LOW (ref 22–29)
CO2 SERPL-SCNC: 20 MMOL/L — LOW (ref 22–29)
CREAT SERPL-MCNC: 0.83 MG/DL — SIGNIFICANT CHANGE UP (ref 0.5–1.3)
CREAT SERPL-MCNC: 0.83 MG/DL — SIGNIFICANT CHANGE UP (ref 0.5–1.3)
EGFR: 87 ML/MIN/1.73M2 — SIGNIFICANT CHANGE UP
EGFR: 87 ML/MIN/1.73M2 — SIGNIFICANT CHANGE UP
EOSINOPHIL # BLD AUTO: 0.14 K/UL — SIGNIFICANT CHANGE UP (ref 0–0.5)
EOSINOPHIL # BLD AUTO: 0.14 K/UL — SIGNIFICANT CHANGE UP (ref 0–0.5)
EOSINOPHIL NFR BLD AUTO: 1.9 % — SIGNIFICANT CHANGE UP (ref 0–6)
EOSINOPHIL NFR BLD AUTO: 1.9 % — SIGNIFICANT CHANGE UP (ref 0–6)
GLUCOSE SERPL-MCNC: 98 MG/DL — SIGNIFICANT CHANGE UP (ref 70–99)
GLUCOSE SERPL-MCNC: 98 MG/DL — SIGNIFICANT CHANGE UP (ref 70–99)
HCT VFR BLD CALC: 29.5 % — LOW (ref 39–50)
HCT VFR BLD CALC: 29.5 % — LOW (ref 39–50)
HGB BLD-MCNC: 9 G/DL — LOW (ref 13–17)
HGB BLD-MCNC: 9 G/DL — LOW (ref 13–17)
IMM GRANULOCYTES NFR BLD AUTO: 1.2 % — HIGH (ref 0–0.9)
IMM GRANULOCYTES NFR BLD AUTO: 1.2 % — HIGH (ref 0–0.9)
LYMPHOCYTES # BLD AUTO: 1.8 K/UL — SIGNIFICANT CHANGE UP (ref 1–3.3)
LYMPHOCYTES # BLD AUTO: 1.8 K/UL — SIGNIFICANT CHANGE UP (ref 1–3.3)
LYMPHOCYTES # BLD AUTO: 24.9 % — SIGNIFICANT CHANGE UP (ref 13–44)
LYMPHOCYTES # BLD AUTO: 24.9 % — SIGNIFICANT CHANGE UP (ref 13–44)
MAGNESIUM SERPL-MCNC: 1.8 MG/DL — SIGNIFICANT CHANGE UP (ref 1.6–2.6)
MAGNESIUM SERPL-MCNC: 1.8 MG/DL — SIGNIFICANT CHANGE UP (ref 1.6–2.6)
MCHC RBC-ENTMCNC: 30.5 GM/DL — LOW (ref 32–36)
MCHC RBC-ENTMCNC: 30.5 GM/DL — LOW (ref 32–36)
MCHC RBC-ENTMCNC: 30.8 PG — SIGNIFICANT CHANGE UP (ref 27–34)
MCHC RBC-ENTMCNC: 30.8 PG — SIGNIFICANT CHANGE UP (ref 27–34)
MCV RBC AUTO: 101 FL — HIGH (ref 80–100)
MCV RBC AUTO: 101 FL — HIGH (ref 80–100)
MONOCYTES # BLD AUTO: 0.62 K/UL — SIGNIFICANT CHANGE UP (ref 0–0.9)
MONOCYTES # BLD AUTO: 0.62 K/UL — SIGNIFICANT CHANGE UP (ref 0–0.9)
MONOCYTES NFR BLD AUTO: 8.6 % — SIGNIFICANT CHANGE UP (ref 2–14)
MONOCYTES NFR BLD AUTO: 8.6 % — SIGNIFICANT CHANGE UP (ref 2–14)
NEUTROPHILS # BLD AUTO: 4.54 K/UL — SIGNIFICANT CHANGE UP (ref 1.8–7.4)
NEUTROPHILS # BLD AUTO: 4.54 K/UL — SIGNIFICANT CHANGE UP (ref 1.8–7.4)
NEUTROPHILS NFR BLD AUTO: 62.7 % — SIGNIFICANT CHANGE UP (ref 43–77)
NEUTROPHILS NFR BLD AUTO: 62.7 % — SIGNIFICANT CHANGE UP (ref 43–77)
PHOSPHATE SERPL-MCNC: 4 MG/DL — SIGNIFICANT CHANGE UP (ref 2.4–4.7)
PHOSPHATE SERPL-MCNC: 4 MG/DL — SIGNIFICANT CHANGE UP (ref 2.4–4.7)
PLATELET # BLD AUTO: 163 K/UL — SIGNIFICANT CHANGE UP (ref 150–400)
PLATELET # BLD AUTO: 163 K/UL — SIGNIFICANT CHANGE UP (ref 150–400)
POTASSIUM SERPL-MCNC: 4.2 MMOL/L — SIGNIFICANT CHANGE UP (ref 3.5–5.3)
POTASSIUM SERPL-MCNC: 4.2 MMOL/L — SIGNIFICANT CHANGE UP (ref 3.5–5.3)
POTASSIUM SERPL-SCNC: 4.2 MMOL/L — SIGNIFICANT CHANGE UP (ref 3.5–5.3)
POTASSIUM SERPL-SCNC: 4.2 MMOL/L — SIGNIFICANT CHANGE UP (ref 3.5–5.3)
RBC # BLD: 2.92 M/UL — LOW (ref 4.2–5.8)
RBC # BLD: 2.92 M/UL — LOW (ref 4.2–5.8)
RBC # FLD: 15.2 % — HIGH (ref 10.3–14.5)
RBC # FLD: 15.2 % — HIGH (ref 10.3–14.5)
SODIUM SERPL-SCNC: 140 MMOL/L — SIGNIFICANT CHANGE UP (ref 135–145)
SODIUM SERPL-SCNC: 140 MMOL/L — SIGNIFICANT CHANGE UP (ref 135–145)
WBC # BLD: 7.24 K/UL — SIGNIFICANT CHANGE UP (ref 3.8–10.5)
WBC # BLD: 7.24 K/UL — SIGNIFICANT CHANGE UP (ref 3.8–10.5)
WBC # FLD AUTO: 7.24 K/UL — SIGNIFICANT CHANGE UP (ref 3.8–10.5)
WBC # FLD AUTO: 7.24 K/UL — SIGNIFICANT CHANGE UP (ref 3.8–10.5)

## 2023-12-18 PROCEDURE — 99233 SBSQ HOSP IP/OBS HIGH 50: CPT | Mod: GC

## 2023-12-18 PROCEDURE — 99233 SBSQ HOSP IP/OBS HIGH 50: CPT

## 2023-12-18 PROCEDURE — 70450 CT HEAD/BRAIN W/O DYE: CPT | Mod: 26

## 2023-12-18 RX ORDER — METOPROLOL TARTRATE 50 MG
5 TABLET ORAL ONCE
Refills: 0 | Status: COMPLETED | OUTPATIENT
Start: 2023-12-18 | End: 2023-12-18

## 2023-12-18 RX ADMIN — Medication 50 MILLIGRAM(S): at 12:07

## 2023-12-18 RX ADMIN — Medication 1 MILLIGRAM(S): at 11:43

## 2023-12-18 RX ADMIN — CHLORHEXIDINE GLUCONATE 1 APPLICATION(S): 213 SOLUTION TOPICAL at 05:32

## 2023-12-18 RX ADMIN — LEVETIRACETAM 400 MILLIGRAM(S): 250 TABLET, FILM COATED ORAL at 18:42

## 2023-12-18 RX ADMIN — POLYETHYLENE GLYCOL 3350 17 GRAM(S): 17 POWDER, FOR SOLUTION ORAL at 11:42

## 2023-12-18 RX ADMIN — Medication 60 MILLIGRAM(S): at 05:41

## 2023-12-18 RX ADMIN — Medication 5 MILLIGRAM(S): at 23:07

## 2023-12-18 RX ADMIN — Medication 1 TABLET(S): at 11:43

## 2023-12-18 RX ADMIN — MODAFINIL 100 MILLIGRAM(S): 200 TABLET ORAL at 09:03

## 2023-12-18 RX ADMIN — LEVETIRACETAM 400 MILLIGRAM(S): 250 TABLET, FILM COATED ORAL at 05:32

## 2023-12-18 RX ADMIN — MEMANTINE HYDROCHLORIDE 5 MILLIGRAM(S): 10 TABLET ORAL at 05:33

## 2023-12-18 NOTE — PROGRESS NOTE ADULT - SUBJECTIVE AND OBJECTIVE BOX
HPI:  82y M with PMH HTN, CAD s/p stents, renal cell carcinoma status post left nephrectomy, bladder CA, BPH, CHF, LBBB, vertigo,  HLD, 5.5cm AAA without rupture post EVAR, paroxysmal A-fib on Eliquis, Plavix, and aspirin, early stage Alzheimer dementia transferred from [facility] s/p unwitnessed fall with head strike at home found by wife on floor at 8am. Patient brought to urgent care by wife and had 5 staples to posterior scalp but was instructed to go to nearest ED.  CT head at [place] showed R frontal IPH, SDH, SAH at 9:00. CTA stroke protocol not performed at [facility]. Patient BIB on 6L NC.  Pt GCS 15 on arrival, A&Ox2 on arrival. After initial assessment, patient noted to be vomiting enroute to CT scanner.    (10 Nov 2023 11:04)    INTERVAL HPI;  11/10 Right hemicraniectomy  11/26 Re upgraded to NSICU for an acute new anterior falx SDH  12/7 Right Cranioplasty  12/10 Drain Dc'd  12/14 Started on Eliquis  12/15 Rpt CTH shows new right frontal IPH, rpt CTH slight increase  12/16 Rpt CTH Stable  12/17 Downgraded from NSICU    OVERNIGHT EVENTS:    84 yo male s/p right craniectomy and subsequent cranioplasty. Patient seen an examined NAD.     Vital Signs Last 24 Hrs  T(C): 36.6 (18 Dec 2023 08:05), Max: 37.1 (17 Dec 2023 15:00)  T(F): 97.9 (18 Dec 2023 08:05), Max: 98.8 (17 Dec 2023 15:00)  HR: 85 (18 Dec 2023 08:05) (74 - 107)  BP: 121/69 (18 Dec 2023 08:05) (107/64 - 145/64)  BP(mean): 74 (18 Dec 2023 01:00) (74 - 85)  RR: 18 (18 Dec 2023 08:05) (14 - 26)  SpO2: 94% (18 Dec 2023 08:05) (93% - 100%)    Parameters below as of 18 Dec 2023 08:05  Patient On (Oxygen Delivery Method): room air    PHYSICAL EXAM  GENERAL: NAD  HEAD:  S/p R crani. Right craniectomy site dry and intact, no drainage noted on palpation  JOEY COMA SCORE: E-4 V-4 M-6 = 14  MENTAL STATUS: AAO x2-3(name, place w/ choices, upcoming holiday "Christmas"), answers simple questions, following simple commands on R, spontaneous eye opening, following most EOM.  CRANIAL NERVES: PERRL, EOMI without nystagmus. Mild L facial.  Tongue is midline. Hearing grossly intact.   MOTOR: strength 4+/5 RUE. RLE bending knee, LUE/LLE trace WD.    SENSATION: dec L sided sensation. grossly intact on R sided  SKIN: Warm, dry    LABS:                        9.0    7.24  )-----------( 163      ( 18 Dec 2023 05:46 )             29.5     12-18    140  |  107  |  16.5  ----------------------------<  98  4.2   |  20.0<L>  |  0.83    Ca    9.0      18 Dec 2023 05:46  Phos  4.0     12-18  Mg     1.8     12-18        Urinalysis Basic - ( 18 Dec 2023 05:46 )    Color: x / Appearance: x / SG: x / pH: x  Gluc: 98 mg/dL / Ketone: x  / Bili: x / Urobili: x   Blood: x / Protein: x / Nitrite: x   Leuk Esterase: x / RBC: x / WBC x   Sq Epi: x / Non Sq Epi: x / Bacteria: x  12-17 @ 07:01  -  12-18 @ 07:00  --------------------------------------------------------  IN: 1300 mL/ OUT: 1905 mL / NET: -605 mL    RADIOLOGY & ADDITIONAL TESTS:  < from: CT Head No Cont (12.16.23 @ 11:41) >  ______________________  IMPRESSION:  1.  No significant interval change.    --- End of Report ---    DIA VELAZQUEZ MD; Attending Radiologist  This document has been electronically signed. Dec 16 2023  1:59PM    < end of copied text >   HPI:  82y M with PMH HTN, CAD s/p stents, renal cell carcinoma status post left nephrectomy, bladder CA, BPH, CHF, LBBB, vertigo,  HLD, 5.5cm AAA without rupture post EVAR, paroxysmal A-fib on Eliquis, Plavix, and aspirin, early stage Alzheimer dementia transferred from [facility] s/p unwitnessed fall with head strike at home found by wife on floor at 8am. Patient brought to urgent care by wife and had 5 staples to posterior scalp but was instructed to go to nearest ED.  CT head at [place] showed R frontal IPH, SDH, SAH at 9:00. CTA stroke protocol not performed at [facility]. Patient BIB on 6L NC.  Pt GCS 15 on arrival, A&Ox2 on arrival. After initial assessment, patient noted to be vomiting enroute to CT scanner.    (10 Nov 2023 11:04)    INTERVAL HPI;  11/10 Right hemicraniectomy  11/26 Re upgraded to NSICU for an acute new anterior falx SDH  12/7 Right Cranioplasty  12/10 Drain Dc'd  12/14 Started on Eliquis  12/15 Rpt CTH shows new right frontal IPH, rpt CTH slight increase  12/16 Rpt CTH Stable  12/17 Downgraded from NSICU    OVERNIGHT EVENTS:    82 yo male s/p right craniectomy and subsequent cranioplasty. Patient seen an examined NAD.     Vital Signs Last 24 Hrs  T(C): 36.6 (18 Dec 2023 08:05), Max: 37.1 (17 Dec 2023 15:00)  T(F): 97.9 (18 Dec 2023 08:05), Max: 98.8 (17 Dec 2023 15:00)  HR: 85 (18 Dec 2023 08:05) (74 - 107)  BP: 121/69 (18 Dec 2023 08:05) (107/64 - 145/64)  BP(mean): 74 (18 Dec 2023 01:00) (74 - 85)  RR: 18 (18 Dec 2023 08:05) (14 - 26)  SpO2: 94% (18 Dec 2023 08:05) (93% - 100%)    Parameters below as of 18 Dec 2023 08:05  Patient On (Oxygen Delivery Method): room air    PHYSICAL EXAM  GENERAL: NAD  HEAD:  S/p R crani. Right craniectomy site dry and intact, no drainage noted on palpation  JOEY COMA SCORE: E-4 V-4 M-6 = 14  MENTAL STATUS: AAO x2-3(name, place w/ choices, upcoming holiday "Christmas"), answers simple questions, following simple commands on R, spontaneous eye opening, following most EOM.  CRANIAL NERVES: PERRL, EOMI without nystagmus. Mild L facial.  Tongue is midline. Hearing grossly intact.   MOTOR: strength 4+/5 RUE. RLE bending knee, LUE/LLE trace WD.    SENSATION: dec L sided sensation. grossly intact on R sided  SKIN: Warm, dry    LABS:                        9.0    7.24  )-----------( 163      ( 18 Dec 2023 05:46 )             29.5     12-18    140  |  107  |  16.5  ----------------------------<  98  4.2   |  20.0<L>  |  0.83    Ca    9.0      18 Dec 2023 05:46  Phos  4.0     12-18  Mg     1.8     12-18        Urinalysis Basic - ( 18 Dec 2023 05:46 )    Color: x / Appearance: x / SG: x / pH: x  Gluc: 98 mg/dL / Ketone: x  / Bili: x / Urobili: x   Blood: x / Protein: x / Nitrite: x   Leuk Esterase: x / RBC: x / WBC x   Sq Epi: x / Non Sq Epi: x / Bacteria: x  12-17 @ 07:01  -  12-18 @ 07:00  --------------------------------------------------------  IN: 1300 mL/ OUT: 1905 mL / NET: -605 mL    RADIOLOGY & ADDITIONAL TESTS:  < from: CT Head No Cont (12.16.23 @ 11:41) >  ______________________  IMPRESSION:  1.  No significant interval change.    --- End of Report ---    DIA VELAZQUEZ MD; Attending Radiologist  This document has been electronically signed. Dec 16 2023  1:59PM    < end of copied text >

## 2023-12-18 NOTE — PROGRESS NOTE ADULT - SUBJECTIVE AND OBJECTIVE BOX
[facility] Division of Hospital Medicine  Jc Cedillo MD    Chief Complaint:  Patient is a 83y old  Male who presents with a chief complaint of s/p unwitnessed fall with head strike (18 Dec 2023 10:15)      SUBJECTIVE / OVERNIGHT EVENTS:  Patient seen and examined at bedside. No acute events reported overnight. No new complaints.    MEDICATIONS  (STANDING):  atorvastatin 80 milliGRAM(s) Oral at bedtime  chlorhexidine 2% Cloths 1 Application(s) Topical <User Schedule>  diltiazem    Tablet 60 milliGRAM(s) Oral <User Schedule>  doxazosin 2 milliGRAM(s) Oral at bedtime  folic acid 1 milliGRAM(s) Oral daily  fondaparinux Injectable 2.5 milliGRAM(s) SubCutaneous <User Schedule>  levETIRAcetam  IVPB 500 milliGRAM(s) IV Intermittent every 12 hours  melatonin 5 milliGRAM(s) Oral at bedtime  memantine 5 milliGRAM(s) Oral two times a day  metoprolol tartrate 50 milliGRAM(s) Oral <User Schedule>  modafinil 100 milliGRAM(s) Oral <User Schedule>  Nephro-roma 1 Tablet(s) Oral daily  polyethylene glycol 3350 17 Gram(s) Oral daily  senna 2 Tablet(s) Oral at bedtime    MEDICATIONS  (PRN):  acetaminophen     Tablet .. 650 milliGRAM(s) Oral every 6 hours PRN Temp greater or equal to 38C (100.4F), Mild Pain (1 - 3)  artificial tears (preservative free) Ophthalmic Solution 1 Drop(s) Both EYES every 6 hours PRN Dry Eyes  hydrALAZINE Injectable 10 milliGRAM(s) IV Push every 2 hours PRN SBP > 160 mmHg  labetalol Injectable 10 milliGRAM(s) IV Push every 2 hours PRN Systolic blood pressure > 160  ondansetron Injectable 4 milliGRAM(s) IV Push every 6 hours PRN Nausea and/or Vomiting        I&O's Summary    17 Dec 2023 07:01  -  18 Dec 2023 07:00  --------------------------------------------------------  IN: 1300 mL / OUT: 1905 mL / NET: -605 mL        PHYSICAL EXAM:  Vital Signs Last 24 Hrs  T(C): 36.7 (18 Dec 2023 12:00), Max: 37.1 (17 Dec 2023 15:00)  T(F): 98 (18 Dec 2023 12:00), Max: 98.8 (17 Dec 2023 15:00)  HR: 86 (18 Dec 2023 12:00) (74 - 107)  BP: 138/75 (18 Dec 2023 12:00) (107/64 - 145/64)  BP(mean): 74 (18 Dec 2023 01:00) (74 - 85)  RR: 18 (18 Dec 2023 12:00) (14 - 26)  SpO2: 95% (18 Dec 2023 12:00) (93% - 100%)    Parameters below as of 18 Dec 2023 12:00  Patient On (Oxygen Delivery Method): room air            CONSTITUTIONAL: NAD  HEENT: Head wrapped  RESPIRATORY: CTA bilaterally, normal effort  CARDIOVASCULAR: RRR, S1/S2+, no m/g/r  ABDOMEN: Nontender to palpation, normoactive bowel sounds, no rebound/guarding  MUSCULOSKELETAL: No edema, cyanosis or deformities.  PSYCH: Calm, affect appropriate.  NEUROLOGY: Awake, alert, RUE is 4/5 and RLE is 2-3/5, LUE/LLE withdrawal to pain  SKIN: No rashes; no palpable lesions  VASC: Distal pulses palpable    LABS:                        9.0    7.24  )-----------( 163      ( 18 Dec 2023 05:46 )             29.5     12-18    140  |  107  |  16.5  ----------------------------<  98  4.2   |  20.0<L>  |  0.83    Ca    9.0      18 Dec 2023 05:46  Phos  4.0     12-18  Mg     1.8     12-18            Urinalysis Basic - ( 18 Dec 2023 05:46 )    Color: x / Appearance: x / SG: x / pH: x  Gluc: 98 mg/dL / Ketone: x  / Bili: x / Urobili: x   Blood: x / Protein: x / Nitrite: x   Leuk Esterase: x / RBC: x / WBC x   Sq Epi: x / Non Sq Epi: x / Bacteria: x        CAPILLARY BLOOD GLUCOSE            RADIOLOGY & ADDITIONAL TESTS:  Results Reviewed:   Imaging Personally Reviewed:  Electrocardiogram Personally Reviewed:                                           [facility] Division of Hospital Medicine  Jc Cedillo MD    Chief Complaint:  Patient is a 83y old  Male who presents with a chief complaint of s/p unwitnessed fall with head strike (18 Dec 2023 10:15)      SUBJECTIVE / OVERNIGHT EVENTS:  Patient seen and examined at bedside. No acute events reported overnight. No new complaints.    MEDICATIONS  (STANDING):  atorvastatin 80 milliGRAM(s) Oral at bedtime  chlorhexidine 2% Cloths 1 Application(s) Topical <User Schedule>  diltiazem    Tablet 60 milliGRAM(s) Oral <User Schedule>  doxazosin 2 milliGRAM(s) Oral at bedtime  folic acid 1 milliGRAM(s) Oral daily  fondaparinux Injectable 2.5 milliGRAM(s) SubCutaneous <User Schedule>  levETIRAcetam  IVPB 500 milliGRAM(s) IV Intermittent every 12 hours  melatonin 5 milliGRAM(s) Oral at bedtime  memantine 5 milliGRAM(s) Oral two times a day  metoprolol tartrate 50 milliGRAM(s) Oral <User Schedule>  modafinil 100 milliGRAM(s) Oral <User Schedule>  Nephro-roma 1 Tablet(s) Oral daily  polyethylene glycol 3350 17 Gram(s) Oral daily  senna 2 Tablet(s) Oral at bedtime    MEDICATIONS  (PRN):  acetaminophen     Tablet .. 650 milliGRAM(s) Oral every 6 hours PRN Temp greater or equal to 38C (100.4F), Mild Pain (1 - 3)  artificial tears (preservative free) Ophthalmic Solution 1 Drop(s) Both EYES every 6 hours PRN Dry Eyes  hydrALAZINE Injectable 10 milliGRAM(s) IV Push every 2 hours PRN SBP > 160 mmHg  labetalol Injectable 10 milliGRAM(s) IV Push every 2 hours PRN Systolic blood pressure > 160  ondansetron Injectable 4 milliGRAM(s) IV Push every 6 hours PRN Nausea and/or Vomiting        I&O's Summary    17 Dec 2023 07:01  -  18 Dec 2023 07:00  --------------------------------------------------------  IN: 1300 mL / OUT: 1905 mL / NET: -605 mL        PHYSICAL EXAM:  Vital Signs Last 24 Hrs  T(C): 36.7 (18 Dec 2023 12:00), Max: 37.1 (17 Dec 2023 15:00)  T(F): 98 (18 Dec 2023 12:00), Max: 98.8 (17 Dec 2023 15:00)  HR: 86 (18 Dec 2023 12:00) (74 - 107)  BP: 138/75 (18 Dec 2023 12:00) (107/64 - 145/64)  BP(mean): 74 (18 Dec 2023 01:00) (74 - 85)  RR: 18 (18 Dec 2023 12:00) (14 - 26)  SpO2: 95% (18 Dec 2023 12:00) (93% - 100%)    Parameters below as of 18 Dec 2023 12:00  Patient On (Oxygen Delivery Method): room air            CONSTITUTIONAL: NAD  HEENT: Head wrapped  RESPIRATORY: CTA bilaterally, normal effort  CARDIOVASCULAR: RRR, S1/S2+, no m/g/r  ABDOMEN: Nontender to palpation, normoactive bowel sounds, no rebound/guarding  MUSCULOSKELETAL: No edema, cyanosis or deformities.  PSYCH: Calm, affect appropriate.  NEUROLOGY: Awake, alert, RUE is 4/5 and RLE is 2-3/5, LUE/LLE withdrawal to pain  SKIN: No rashes; no palpable lesions  VASC: Distal pulses palpable    LABS:                        9.0    7.24  )-----------( 163      ( 18 Dec 2023 05:46 )             29.5     12-18    140  |  107  |  16.5  ----------------------------<  98  4.2   |  20.0<L>  |  0.83    Ca    9.0      18 Dec 2023 05:46  Phos  4.0     12-18  Mg     1.8     12-18            Urinalysis Basic - ( 18 Dec 2023 05:46 )    Color: x / Appearance: x / SG: x / pH: x  Gluc: 98 mg/dL / Ketone: x  / Bili: x / Urobili: x   Blood: x / Protein: x / Nitrite: x   Leuk Esterase: x / RBC: x / WBC x   Sq Epi: x / Non Sq Epi: x / Bacteria: x        CAPILLARY BLOOD GLUCOSE            RADIOLOGY & ADDITIONAL TESTS:  Results Reviewed:   Imaging Personally Reviewed:  Electrocardiogram Personally Reviewed:

## 2023-12-18 NOTE — PROGRESS NOTE ADULT - ASSESSMENT
83M w/ PMH HTN, CAD s/p stents on ASA/Plavix, RCC s/p L nephrectomy, bladder CA, BPH, CHF, LBBB, vertigo, HLD, 5.5 cm AAA without rupture post EVAR, paroxysmal afib on Eliquis, early stage Alzheimer's dementia, presented to [facility] 11/10/23 s/p unwitnessed fall with head strike at home found by wife, found with multicompartmental ICH, s/p R craniectomy 11/10/23, course significant for Klebsiella pneumonia, hypernatremia, lethargy, LUE DVT, new anterior falcine SDH.  12/7 Right Cranioplasty  -CTH s/p starting eliquis showed new R frontal ICH. 4hr repeat CTH showed worsening R frontal ICH.   -Rpt CTH stable    Plan:  - Medical management per medicine  - Q2h neuro checks  - Continue to hold Eliquis and ASA, recommend Cardiology/medicine input on necessity of AC  -Craniotomy incision wrapped during AM Rounds with Dr. Larios, continue to monitor and record would drainage  - Keppra 500 BID  - HOB 30 degrees   - PT/OT  - Patient seen and examined on AM rounds with Dr. Larios   83M w/ PMH HTN, CAD s/p stents on ASA/Plavix, RCC s/p L nephrectomy, bladder CA, BPH, CHF, LBBB, vertigo, HLD, 5.5 cm AAA without rupture post EVAR, paroxysmal afib on Eliquis, early stage Alzheimer's dementia, presented to [facility] 11/10/23 s/p unwitnessed fall with head strike at home found by wife, found with multicompartmental ICH, s/p R craniectomy 11/10/23, course significant for Klebsiella pneumonia, hypernatremia, lethargy, LUE DVT, new anterior falcine SDH.  12/7 Right Cranioplasty  -CTH s/p starting eliquis showed new R frontal ICH. 4hr repeat CTH showed worsening R frontal ICH.   -Rpt CTH stable    Plan:  - Medical management per medicine  - Q2h neuro checks  - Continue to hold Eliquis and ASA, recommend Cardiology/medicine input on necessity of AC  -Craniotomy incision wrapped during AM Rounds with Dr. Larios, continue to monitor and record would drainage  - Keppra 500 BID  - HOB 30 degrees   - PT/OT  - Patient seen and examined on AM rounds with Dr. Larios

## 2023-12-18 NOTE — PROGRESS NOTE ADULT - ASSESSMENT
84 y/o male with PMH of HTN, CAD s/p PCO, RCC s/p left nephrectomy, bladder Ca, BPH, CHF, Vertigo, HLD, AAA s/p EVAR, parox Afib, h/o HIT, Alzheimer transferred s/p fall with Rt frontal IPH. Initially had hemicraniectomy course complicated by RVR, aspiration PNA, hypoxic respiratory failure, LUE DVT and questionable increase in AAA size. Started on AC with waxing/waning mental status prompting head imaging with evidence of new bleed. Cardiology following. Neurology consulted after MRI showed stroke. Echo done concerning for mobile echodensity on aortic valve and strokes in multiple arterial territories; WHIT recommended but as noted documented but family declined. Patient was downgraded to medical floor and later upgraded to neuro ICU after cranioplasty; subgaleal drain was removed 12/10. Patient found to have low grade fever, tachycardic, blood culture sent, UA noted for pyuria. Downgraded to medicine team 12/10. After discussion with interdisciplinary teams, Eliquis and ASA was restarted. CT head obtained on 12/15 showed new increase in right frontal IPH. Upgraded again to neuroICU. Was given andexxa for reversal. Repeat CT heads are now stable. Now deemed stable to downgrad again to medicine.     IPH  -s/p decompressive R hemicraniotomy 11/10 and s/p R cranioplasty with replacement of bone flap 12/7  -additional repeat CTH 12/11 for change in neuro exam; stable   -s/p subgaleal drain removed 12/10   -CTH 12/14 s/p R frontal prosthetic cranioplasty, adjacent to cranioplasty is extra-axial collection of fluid, air, hemorrhage, smaller in size; stable small R frontal subdural hypodense collection; improved small hemorrhages in posterior R frontal lobe; no new hemorrhage   -neurosx following   -cont keppra bid   -Was restarted on AC and then repeat CT head showed increase in right frontal IPH  -Patient was then again transferred to NSICU - Given andexxa  -PT/OT plan for [facility]    paroxysmal afib  HTN, HLD   CAD   -cont metoprolol, diltiazem BID   -cont statin   -cont prn labetalol, hydralazine   -Can resume asa on 12/23 per NS - Recommend against restarting of AC given 2 seperate bleeds. Outpatient follow up with Cardiology    Acute LUE DVT   -US LUE: stable partially occlusive L axillary vein thrombosis and L cephalic vein superficial thrombophlebitis; segmental occlusive thrombosis within 1 of 2 brachial veins AV fistula surrounded by thrombosis vs pseudoaneurysm   -Ac has to be held  -avoid heparin products 2/2 hx of HIT   -BLE SCDs - Started on Fondaparinux  - Discussed with IR - no indication for any filter such SVC.     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 Initially had hemicraniectomy course complicated by RVR, aspiration PNA, hypoxic respiratory failure, LUE DVT and questionable increase in AAA size. Started on AC with waxing/waning mental status prompting head imaging with evidence of new bleed. Cardiology following. Neurology consulted after MRI showed stroke. Echo done concerning for mobile echodensity on aortic valve and strokes in multiple arterial territories; WHIT recommended but as noted documented but family declined. Patient was downgraded to medical floor and later upgraded to neuro ICU after cranioplasty; subgaleal drain was removed 12/10. Patient found to have low grade fever, tachycardic, blood culture sent, UA noted for pyuria. Downgraded to medicine team 12/10. After discussion with interdisciplinary teams, Eliquis and ASA was restarted. CT head obtained on 12/15 showed new increase in right frontal IPH. Upgraded again to neuroICU. Was given andexxa for reversal. Repeat CT heads are now stable. Now deemed stable to downgrad again to medicine.     IPH  -s/p decompressive R hemicraniotomy 11/10 and s/p R cranioplasty with replacement of bone flap 12/7  -additional repeat CTH 12/11 for change in neuro exam; stable   -s/p subgaleal drain removed 12/10   -CTH 12/14 s/p R frontal prosthetic cranioplasty, adjacent to cranioplasty is extra-axial collection of fluid, air, hemorrhage, smaller in size; stable small R frontal subdural hypodense collection; improved small hemorrhages in posterior R frontal lobe; no new hemorrhage   -neurosx following   -cont keppra bid   -Was restarted on AC and then repeat CT head showed increase in right frontal IPH  -Patient was then again transferred to NSICU - Given andexxa  -PT/OT plan for [facility]    paroxysmal afib  HTN, HLD   CAD   -cont metoprolol, diltiazem BID   -cont statin   -cont prn labetalol, hydralazine   -Can resume asa on 12/23 per NS - Recommend against restarting of AC given 2 seperate bleeds. Outpatient follow up with Cardiology    Acute LUE DVT   -US LUE: stable partially occlusive L axillary vein thrombosis and L cephalic vein superficial thrombophlebitis; segmental occlusive thrombosis within 1 of 2 brachial veins AV fistula surrounded by thrombosis vs pseudoaneurysm   -Ac has to be held  -avoid heparin products 2/2 hx of HIT   -BLE SCDs - Started on Fondaparinux  - Discussed with IR - no indication for any filter such SVC.     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

## 2023-12-18 NOTE — PROGRESS NOTE ADULT - SUBJECTIVE AND OBJECTIVE BOX
CTH obtained s/p starting Eliquis yesterday. CTH shows small new R frontal ICH.    Pt s/p right hemicraniectomy on 11/10, R cranioplasty on 12/7. 24hr CTH obtained after starting Eliquis 5mg BID showed new R frontal ICH. Eliquis and ASA were discontinued. Repeat 4hr CTH showed worsening R frontal ICH. Pt upgraded to NeuroICU. Reversed with Andexxa. Pt's exam has remained the same.     VSS, afebrile       FUNCTIONAL PROGRESS  12/15 SLP   Recommendations    Speech Language Pathology Recommendations: 1. Upgrade to minced & moist diet, continue moderately thick liquids 2. 1:1 assist 3. STRICT aspiration precautions 4. Pt must be awake/alert & upright for PO5. Slow rate, small bites/sips, alternate solids/liquids6. Oral care7. SLP to follow as pt status and schedule permits     PT/OT Pending re-eval       VITALS  T(C): 36.6 (12-18-23 @ 08:05), Max: 37.1 (12-17-23 @ 15:00)  HR: 85 (12-18-23 @ 08:05) (74 - 107)  BP: 121/69 (12-18-23 @ 08:05) (93/56 - 145/64)  RR: 18 (12-18-23 @ 08:05) (14 - 26)  SpO2: 94% (12-18-23 @ 08:05) (93% - 100%)  Wt(kg): --    MEDICATIONS   acetaminophen     Tablet .. 650 milliGRAM(s) every 6 hours PRN  artificial tears (preservative free) Ophthalmic Solution 1 Drop(s) every 6 hours PRN  atorvastatin 80 milliGRAM(s) at bedtime  chlorhexidine 2% Cloths 1 Application(s) <User Schedule>  diltiazem    Tablet 60 milliGRAM(s) <User Schedule>  doxazosin 2 milliGRAM(s) at bedtime  folic acid 1 milliGRAM(s) daily  fondaparinux Injectable 2.5 milliGRAM(s) <User Schedule>  hydrALAZINE Injectable 10 milliGRAM(s) every 2 hours PRN  labetalol Injectable 10 milliGRAM(s) every 2 hours PRN  levETIRAcetam  IVPB 500 milliGRAM(s) every 12 hours  melatonin 5 milliGRAM(s) at bedtime  memantine 5 milliGRAM(s) two times a day  metoprolol tartrate 50 milliGRAM(s) <User Schedule>  modafinil 100 milliGRAM(s) <User Schedule>  Nephro-roma 1 Tablet(s) daily  ondansetron Injectable 4 milliGRAM(s) every 6 hours PRN  polyethylene glycol 3350 17 Gram(s) daily  senna 2 Tablet(s) at bedtime      RECENT LABS/IMAGING  - Reviewed Today                        9.0    7.24  )-----------( 163      ( 18 Dec 2023 05:46 )             29.5     12-18    140  |  107  |  16.5  ----------------------------<  98  4.2   |  20.0<L>  |  0.83    Ca    9.0      18 Dec 2023 05:46  Phos  4.0     12-18  Mg     1.8     12-18        Urinalysis Basic - ( 18 Dec 2023 05:46 )    Color: x / Appearance: x / SG: x / pH: x  Gluc: 98 mg/dL / Ketone: x  / Bili: x / Urobili: x   Blood: x / Protein: x / Nitrite: x   Leuk Esterase: x / RBC: x / WBC x   Sq Epi: x / Non Sq Epi: x / Bacteria: x        MR Brain 11/13 - RIGHT frontoparietal craniectomy with underlying intracranial hemorrhage and edema within the RIGHT frontal lobe. Overlying small RIGHT subdural hemorrhage is also unchanged. Scant hemorrhage in the dependent portions of the BILATERAL lateral ventricles and minimal subarachnoid hemorrhage seen in the BILATERAL frontal and parietal lobes. Moderate periventricular and deep white matter ischemia is noted. Encephalomalacia and gliosis seen in the anterior frontal lobes bilaterally. Tiny acute lacunar infarction in the LEFT cerebellum and tiny acute cortical infarction in the RIGHT parietal lobe. Restricted diffusion also noted about the surgical cavity suggesting infarcted parenchyma.    CXR 11/15 - There is mild pulmonary venous congestion. Left retrocardiac/lower lobe opacity could represent associated pneumonia.    HEAD CT 11/16 - Improved to stable multicompartment intracranial hemorrhages. Right-sided subdural hygroma, larger in size.    US V DOPPLER BLE 11/16 - No evidence of deep venous thrombosis in either lower extremity.    HEAD CT 11/22 - Mixed attenuating subdural hematoma again seen with some expected postoperative changes. Evolving area of parenchymal hemorrhage involving the right frontal region.    HEAD CT 11/23 - Stable multicompartment intracranial hemorrhages. Stable right-sided subdural collection.    HEAD CT 11/29 - Stable intracranial hemorrhages.    HEAD CT 12/2  Right hemicraniectomy. Right subdural fluid attenuation collection as seen on the prior. Decreased conspicuity to regions of acute hemorrhage in the right high frontal parasagittal region as well as stable appearance to hemorrhage in the right insular posterior frontal region in association with lucency as seen on the prior study    HEAD CT 12/5 - Unchanged evolving hemorrhage within the RIGHT frontal lobe, medially and laterally with associated edema. RIGHT frontoparietal hemicraniectomy is again noted with unchanged extra-axial. Mild to moderate periventricular white matter ischemia noted.    HEAD CT 12/11 - No change since 12/9/2023.    BLE V DOPPLER 12/12 - No evidence of deep venous thrombosis in either lower extremity.    HEAD CT 12/14 -  The patient is status post right frontal prosthetic cranioplasty. Adjacent to the cranioplasty is extra-axial collection of fluid, air, hemorrhage, smaller in size. Stable small right frontal convexity subdural hypodense collection. Improved small hemorrhages in the posterior right frontal lobe with adjacent hypodensity. Thin posterior falx subdural hemorrhage tracks along the left tentorial leaflet, unchanged. No new hemorrhage.    HEAD CT 12/15: IMPRESSION:  1.  New 17 x 13 mm hemorrhage in the medial right frontal lobe.  2.  Mild interval decrease in size of the right hemispheric heterogeneous   subdural hematoma.  3.  Mild interval increased thickness of the hypodense fluid collection   superficial of the cranioplasty site.  4.  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    HEAD CT 12/16:  1.  No significant interval change.    ----------------------------------------------------------------------------------------  PHYSICAL EXAM  Constitutional - NAD, Comfortable   HEENT - Right craniotomy - +Dressing  Extremities - No peripheral edema   Neurologic Exam -                    Cognitive - Awake/Alert     Communication - Hypophonic, Delayed processing     Motor - L EF 1/5, L EE 1/5   Psych - Awake   ----------------------------------------------------------------------------------------  ASSESSMENT/PLAN  83yMale with functional deficits after a fall sustaining multicompartment intracranial bleeding  Wakefulness - Provigil 100mg Q8AM (12/14)  Seizure PPX - Keppra  Alzheimer's Dementia - Namenda 5mg BID  Pyuria - Rocephin   CAD, PAFIB - Cardizem, Lopressor, Lipitor, Hydralazine, Labetalol  HTN - Hydralazine, labetalol   Sleep - Melatonin    Oropharyngeal Dysphagia - Puree & MOD THICK Liquids  Pain - Tylenol   DVT PPX/LUE DVT - SCD, apixaban   Rehab/Impaired mobility and function - Continuous hospitalization is crucial for managing the patient's acute medical issues (intracranial bleed, AMS, hemiplegia), which have significantly impacted their mobility, quality of life, and function. Rehabilitation recommendations will be based on the patient's functional progress and their ability to participate in and tolerate therapeutic interventions, which may change over time. Maintaining ongoing mobilization by the staff is imperative to prevent secondary medical complications and associated health issues related to debility.    Given patient's medical diagnosis, functional status, and potential for progress, he is likely a good candidate for [facility] placement. He currently DOES NOT meet the criteria for acute inpatient rehabilitation and would not tolerate 3h of intensive PT/OT/SLP daily. Discharge recommendations are subject to change and PM&R team will continue to follow to monitor progress while in the hospital.     Patient with limited wakefulness this AM. Recommend restarting Provigil 100mg 0800 (already ordered). Recommend OT for splint evaluation and left sided resting hand splint to be worn throughout the day (will place orders).          Patient noted to have R frontal ICH after starting Eliquis, transferred to the ICU, repeat CTH stable, transferred back to the floor   Patient tired today, difficult to arouse  VSS, afebrile       FUNCTIONAL PROGRESS  12/15 SLP   Recommendations    Speech Language Pathology Recommendations: 1. Upgrade to minced & moist diet, continue moderately thick liquids 2. 1:1 assist 3. STRICT aspiration precautions 4. Pt must be awake/alert & upright for PO5. Slow rate, small bites/sips, alternate solids/liquids6. Oral care7. SLP to follow as pt status and schedule permits     PT/OT Pending re-eval       VITALS  T(C): 36.6 (12-18-23 @ 08:05), Max: 37.1 (12-17-23 @ 15:00)  HR: 85 (12-18-23 @ 08:05) (74 - 107)  BP: 121/69 (12-18-23 @ 08:05) (93/56 - 145/64)  RR: 18 (12-18-23 @ 08:05) (14 - 26)  SpO2: 94% (12-18-23 @ 08:05) (93% - 100%)  Wt(kg): --    MEDICATIONS   acetaminophen     Tablet .. 650 milliGRAM(s) every 6 hours PRN  artificial tears (preservative free) Ophthalmic Solution 1 Drop(s) every 6 hours PRN  atorvastatin 80 milliGRAM(s) at bedtime  chlorhexidine 2% Cloths 1 Application(s) <User Schedule>  diltiazem    Tablet 60 milliGRAM(s) <User Schedule>  doxazosin 2 milliGRAM(s) at bedtime  folic acid 1 milliGRAM(s) daily  fondaparinux Injectable 2.5 milliGRAM(s) <User Schedule>  hydrALAZINE Injectable 10 milliGRAM(s) every 2 hours PRN  labetalol Injectable 10 milliGRAM(s) every 2 hours PRN  levETIRAcetam  IVPB 500 milliGRAM(s) every 12 hours  melatonin 5 milliGRAM(s) at bedtime  memantine 5 milliGRAM(s) two times a day  metoprolol tartrate 50 milliGRAM(s) <User Schedule>  modafinil 100 milliGRAM(s) <User Schedule>  Nephro-roma 1 Tablet(s) daily  ondansetron Injectable 4 milliGRAM(s) every 6 hours PRN  polyethylene glycol 3350 17 Gram(s) daily  senna 2 Tablet(s) at bedtime      RECENT LABS/IMAGING  - Reviewed Today                        9.0    7.24  )-----------( 163      ( 18 Dec 2023 05:46 )             29.5     12-18    140  |  107  |  16.5  ----------------------------<  98  4.2   |  20.0<L>  |  0.83    Ca    9.0      18 Dec 2023 05:46  Phos  4.0     12-18  Mg     1.8     12-18      Urinalysis Basic - ( 18 Dec 2023 05:46 )    Color: x / Appearance: x / SG: x / pH: x  Gluc: 98 mg/dL / Ketone: x  / Bili: x / Urobili: x   Blood: x / Protein: x / Nitrite: x   Leuk Esterase: x / RBC: x / WBC x   Sq Epi: x / Non Sq Epi: x / Bacteria: x      MR Brain 11/13 - RIGHT frontoparietal craniectomy with underlying intracranial hemorrhage and edema within the RIGHT frontal lobe. Overlying small RIGHT subdural hemorrhage is also unchanged. Scant hemorrhage in the dependent portions of the BILATERAL lateral ventricles and minimal subarachnoid hemorrhage seen in the BILATERAL frontal and parietal lobes. Moderate periventricular and deep white matter ischemia is noted. Encephalomalacia and gliosis seen in the anterior frontal lobes bilaterally. Tiny acute lacunar infarction in the LEFT cerebellum and tiny acute cortical infarction in the RIGHT parietal lobe. Restricted diffusion also noted about the surgical cavity suggesting infarcted parenchyma.    CXR 11/15 - There is mild pulmonary venous congestion. Left retrocardiac/lower lobe opacity could represent associated pneumonia.    HEAD CT 11/16 - Improved to stable multicompartment intracranial hemorrhages. Right-sided subdural hygroma, larger in size.    US V DOPPLER BLE 11/16 - No evidence of deep venous thrombosis in either lower extremity.    HEAD CT 11/22 - Mixed attenuating subdural hematoma again seen with some expected postoperative changes. Evolving area of parenchymal hemorrhage involving the right frontal region.    HEAD CT 11/23 - Stable multicompartment intracranial hemorrhages. Stable right-sided subdural collection.    HEAD CT 11/29 - Stable intracranial hemorrhages.    HEAD CT 12/2  Right hemicraniectomy. Right subdural fluid attenuation collection as seen on the prior. Decreased conspicuity to regions of acute hemorrhage in the right high frontal parasagittal region as well as stable appearance to hemorrhage in the right insular posterior frontal region in association with lucency as seen on the prior study    HEAD CT 12/5 - Unchanged evolving hemorrhage within the RIGHT frontal lobe, medially and laterally with associated edema. RIGHT frontoparietal hemicraniectomy is again noted with unchanged extra-axial. Mild to moderate periventricular white matter ischemia noted.    HEAD CT 12/11 - No change since 12/9/2023.    BLE V DOPPLER 12/12 - No evidence of deep venous thrombosis in either lower extremity.    HEAD CT 12/14 -  The patient is status post right frontal prosthetic cranioplasty. Adjacent to the cranioplasty is extra-axial collection of fluid, air, hemorrhage, smaller in size. Stable small right frontal convexity subdural hypodense collection. Improved small hemorrhages in the posterior right frontal lobe with adjacent hypodensity. Thin posterior falx subdural hemorrhage tracks along the left tentorial leaflet, unchanged. No new hemorrhage.    HEAD CT 12/15: IMPRESSION:  1.  New 17 x 13 mm hemorrhage in the medial right frontal lobe.  2.  Mild interval decrease in size of the right hemispheric heterogeneous   subdural hematoma.  3.  Mild interval increased thickness of the hypodense fluid collection   superficial of the cranioplasty site.  4.  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    HEAD CT 12/16:  1.  No significant interval change.    ----------------------------------------------------------------------------------------  PHYSICAL EXAM  Constitutional - NAD, Comfortable   HEENT - Right craniotomy - +Dressing  Extremities - No peripheral edema   Neurologic Exam -                    Cognitive - Lethargic      Communication - Hypophonic, Delayed processing     Motor - L EF 1/5, L EE 1/5   Psych - Lethargic    ----------------------------------------------------------------------------------------  ASSESSMENT/PLAN  83yMale with functional deficits after a fall sustaining multicompartment intracranial bleeding  Wakefulness - Provigil 100mg Q8AM (12/14)  Seizure PPX - Keppra  Alzheimer's Dementia - Namenda 5mg BID  CAD, PAFIB - Cardizem, Lopressor, Lipitor, Hydralazine, Labetalol  HTN - Hydralazine, labetalol   Sleep - Melatonin    Oropharyngeal Dysphagia - Puree & MOD THICK Liquids  Pain - Tylenol   DVT PPX/LUE DVT - SCD   Rehab/Impaired mobility and function - Continuous hospitalization is crucial for managing the patient's acute medical issues (intracranial bleed, AMS, hemiplegia), which have significantly impacted their mobility, quality of life, and function. Rehabilitation recommendations will be based on the patient's functional progress and their ability to participate in and tolerate therapeutic interventions, which may change over time. Maintaining ongoing mobilization by the staff is imperative to prevent secondary medical complications and associated health issues related to debility.    Given patient's medical diagnosis, functional status, and potential for progress, he is likely a good candidate for [facility] placement. He currently DOES NOT meet the criteria for acute inpatient rehabilitation and would not tolerate 3h of intensive PT/OT/SLP daily.     PM&R will sign off at this time. Thank you for the interesting consult. Please re-consult if any new deficits arise.

## 2023-12-18 NOTE — PROGRESS NOTE ADULT - ATTENDING COMMENTS
Patient seen and examined, discussed patient with Dr. Tovar and agree with recommendations.    Continue to recommend [facility], patient DOES NOT meet acute inpatient rehabilitation criteria. Patient needs a more prolonged stay to achieve transition to community living and would not be able to tolerate a comprehensive/intense rehab program of 3hours/day.     Will sign off at this time. Thank you for allowing me to be part of your patient's care. Please reconsult PMR for additional rehab recommendations or dispo needs if functional status changes. Discussed the specific management and recommendations above with rehab clinical care team/rehab liaison.

## 2023-12-18 NOTE — CHART NOTE - NSCHARTNOTEFT_GEN_A_CORE
NEUROSURGERY NOTE    Patient's 7pm CTH resulted showing stable/improved bleed but increased ventricle size compared to previous CTH scan from 12/16.     On exam patient is very lethargic, opens eyes to voice and oriented to self but not place, time or situation. Follows command on RUE.     Wife at bedside states that he has been lethargic throughout the day and has been less alert compared to yesterday.     At this time we will upgrade him to SDU for Q2h neuro checks and will get a repeat CTH tomorrow morning 12/19 to follow up increased ventricular size.    Will update medicine team.

## 2023-12-19 LAB
ALBUMIN SERPL ELPH-MCNC: 2.9 G/DL — LOW (ref 3.3–5.2)
ALBUMIN SERPL ELPH-MCNC: 2.9 G/DL — LOW (ref 3.3–5.2)
ALP SERPL-CCNC: 143 U/L — HIGH (ref 40–120)
ALP SERPL-CCNC: 143 U/L — HIGH (ref 40–120)
ALT FLD-CCNC: 8 U/L — SIGNIFICANT CHANGE UP
ALT FLD-CCNC: 8 U/L — SIGNIFICANT CHANGE UP
ANION GAP SERPL CALC-SCNC: 12 MMOL/L — SIGNIFICANT CHANGE UP (ref 5–17)
ANION GAP SERPL CALC-SCNC: 12 MMOL/L — SIGNIFICANT CHANGE UP (ref 5–17)
AST SERPL-CCNC: 14 U/L — SIGNIFICANT CHANGE UP
AST SERPL-CCNC: 14 U/L — SIGNIFICANT CHANGE UP
BILIRUB SERPL-MCNC: 0.2 MG/DL — LOW (ref 0.4–2)
BILIRUB SERPL-MCNC: 0.2 MG/DL — LOW (ref 0.4–2)
BUN SERPL-MCNC: 15 MG/DL — SIGNIFICANT CHANGE UP (ref 8–20)
BUN SERPL-MCNC: 15 MG/DL — SIGNIFICANT CHANGE UP (ref 8–20)
CALCIUM SERPL-MCNC: 9.2 MG/DL — SIGNIFICANT CHANGE UP (ref 8.4–10.5)
CALCIUM SERPL-MCNC: 9.2 MG/DL — SIGNIFICANT CHANGE UP (ref 8.4–10.5)
CHLORIDE SERPL-SCNC: 103 MMOL/L — SIGNIFICANT CHANGE UP (ref 96–108)
CHLORIDE SERPL-SCNC: 103 MMOL/L — SIGNIFICANT CHANGE UP (ref 96–108)
CO2 SERPL-SCNC: 23 MMOL/L — SIGNIFICANT CHANGE UP (ref 22–29)
CO2 SERPL-SCNC: 23 MMOL/L — SIGNIFICANT CHANGE UP (ref 22–29)
CREAT SERPL-MCNC: 0.84 MG/DL — SIGNIFICANT CHANGE UP (ref 0.5–1.3)
CREAT SERPL-MCNC: 0.84 MG/DL — SIGNIFICANT CHANGE UP (ref 0.5–1.3)
EGFR: 87 ML/MIN/1.73M2 — SIGNIFICANT CHANGE UP
EGFR: 87 ML/MIN/1.73M2 — SIGNIFICANT CHANGE UP
GLUCOSE SERPL-MCNC: 103 MG/DL — HIGH (ref 70–99)
GLUCOSE SERPL-MCNC: 103 MG/DL — HIGH (ref 70–99)
HCT VFR BLD CALC: 27.5 % — LOW (ref 39–50)
HCT VFR BLD CALC: 27.5 % — LOW (ref 39–50)
HGB BLD-MCNC: 8.7 G/DL — LOW (ref 13–17)
HGB BLD-MCNC: 8.7 G/DL — LOW (ref 13–17)
MCHC RBC-ENTMCNC: 31.4 PG — SIGNIFICANT CHANGE UP (ref 27–34)
MCHC RBC-ENTMCNC: 31.4 PG — SIGNIFICANT CHANGE UP (ref 27–34)
MCHC RBC-ENTMCNC: 31.6 GM/DL — LOW (ref 32–36)
MCHC RBC-ENTMCNC: 31.6 GM/DL — LOW (ref 32–36)
MCV RBC AUTO: 99.3 FL — SIGNIFICANT CHANGE UP (ref 80–100)
MCV RBC AUTO: 99.3 FL — SIGNIFICANT CHANGE UP (ref 80–100)
PLATELET # BLD AUTO: 204 K/UL — SIGNIFICANT CHANGE UP (ref 150–400)
PLATELET # BLD AUTO: 204 K/UL — SIGNIFICANT CHANGE UP (ref 150–400)
POTASSIUM SERPL-MCNC: 4.2 MMOL/L — SIGNIFICANT CHANGE UP (ref 3.5–5.3)
POTASSIUM SERPL-MCNC: 4.2 MMOL/L — SIGNIFICANT CHANGE UP (ref 3.5–5.3)
POTASSIUM SERPL-SCNC: 4.2 MMOL/L — SIGNIFICANT CHANGE UP (ref 3.5–5.3)
POTASSIUM SERPL-SCNC: 4.2 MMOL/L — SIGNIFICANT CHANGE UP (ref 3.5–5.3)
PROT SERPL-MCNC: 6.2 G/DL — LOW (ref 6.6–8.7)
PROT SERPL-MCNC: 6.2 G/DL — LOW (ref 6.6–8.7)
RBC # BLD: 2.77 M/UL — LOW (ref 4.2–5.8)
RBC # BLD: 2.77 M/UL — LOW (ref 4.2–5.8)
RBC # FLD: 15 % — HIGH (ref 10.3–14.5)
RBC # FLD: 15 % — HIGH (ref 10.3–14.5)
SODIUM SERPL-SCNC: 138 MMOL/L — SIGNIFICANT CHANGE UP (ref 135–145)
SODIUM SERPL-SCNC: 138 MMOL/L — SIGNIFICANT CHANGE UP (ref 135–145)
WBC # BLD: 7.79 K/UL — SIGNIFICANT CHANGE UP (ref 3.8–10.5)
WBC # BLD: 7.79 K/UL — SIGNIFICANT CHANGE UP (ref 3.8–10.5)
WBC # FLD AUTO: 7.79 K/UL — SIGNIFICANT CHANGE UP (ref 3.8–10.5)
WBC # FLD AUTO: 7.79 K/UL — SIGNIFICANT CHANGE UP (ref 3.8–10.5)

## 2023-12-19 PROCEDURE — 99233 SBSQ HOSP IP/OBS HIGH 50: CPT

## 2023-12-19 PROCEDURE — 70450 CT HEAD/BRAIN W/O DYE: CPT | Mod: 26

## 2023-12-19 RX ORDER — DILTIAZEM HCL 120 MG
10 CAPSULE, EXT RELEASE 24 HR ORAL ONCE
Refills: 0 | Status: COMPLETED | OUTPATIENT
Start: 2023-12-19 | End: 2023-12-19

## 2023-12-19 RX ADMIN — LEVETIRACETAM 400 MILLIGRAM(S): 250 TABLET, FILM COATED ORAL at 17:05

## 2023-12-19 RX ADMIN — Medication 1 MILLIGRAM(S): at 11:35

## 2023-12-19 RX ADMIN — MEMANTINE HYDROCHLORIDE 5 MILLIGRAM(S): 10 TABLET ORAL at 17:07

## 2023-12-19 RX ADMIN — FONDAPARINUX SODIUM 2.5 MILLIGRAM(S): 2.5 INJECTION, SOLUTION SUBCUTANEOUS at 03:47

## 2023-12-19 RX ADMIN — Medication 60 MILLIGRAM(S): at 17:06

## 2023-12-19 RX ADMIN — Medication 5 MILLIGRAM(S): at 23:03

## 2023-12-19 RX ADMIN — Medication 10 MILLIGRAM(S): at 01:19

## 2023-12-19 RX ADMIN — Medication 1 TABLET(S): at 17:05

## 2023-12-19 RX ADMIN — SENNA PLUS 2 TABLET(S): 8.6 TABLET ORAL at 23:03

## 2023-12-19 RX ADMIN — CHLORHEXIDINE GLUCONATE 1 APPLICATION(S): 213 SOLUTION TOPICAL at 06:20

## 2023-12-19 RX ADMIN — Medication 2 MILLIGRAM(S): at 23:02

## 2023-12-19 RX ADMIN — MODAFINIL 100 MILLIGRAM(S): 200 TABLET ORAL at 11:35

## 2023-12-19 RX ADMIN — ATORVASTATIN CALCIUM 80 MILLIGRAM(S): 80 TABLET, FILM COATED ORAL at 23:02

## 2023-12-19 RX ADMIN — LEVETIRACETAM 400 MILLIGRAM(S): 250 TABLET, FILM COATED ORAL at 06:20

## 2023-12-19 RX ADMIN — Medication 50 MILLIGRAM(S): at 11:35

## 2023-12-19 RX ADMIN — Medication 50 MILLIGRAM(S): at 23:06

## 2023-12-19 NOTE — PROGRESS NOTE ADULT - ASSESSMENT
84 y/o male with PMH of HTN, CAD s/p PCO, RCC s/p left nephrectomy, bladder Ca, BPH, CHF, Vertigo, HLD, AAA s/p EVAR, parox Afib, h/o HIT, Alzheimer transferred s/p fall with Rt frontal IPH. Initially had hemicraniectomy course complicated by RVR, aspiration PNA, hypoxic respiratory failure, LUE DVT and questionable increase in AAA size. Started on AC with waxing/waning mental status prompting head imaging with evidence of new bleed. Cardiology following. Neurology consulted after MRI showed stroke. Echo done concerning for mobile echodensity on aortic valve and strokes in multiple arterial territories; WHIT recommended but as noted documented but family declined. Patient was downgraded to medical floor and later upgraded to neuro ICU after cranioplasty; subgaleal drain was removed 12/10. Patient found to have low grade fever, tachycardic, blood culture sent, UA noted for pyuria. Downgraded to medicine team 12/10. After discussion with interdisciplinary teams, Eliquis and ASA was restarted. CT head obtained on 12/15 showed new increase in right frontal IPH. Upgraded again to neuroICU. Was given andexxa for reversal. Repeat CT heads are now stable. Now deemed stable to downgrad again to medicine.     IPH  -s/p decompressive R hemicraniotomy 11/10 and s/p R cranioplasty with replacement of bone flap 12/7  -additional repeat CTH 12/11 for change in neuro exam; stable   -s/p subgaleal drain removed 12/10   -CTH 12/14 s/p R frontal prosthetic cranioplasty, adjacent to cranioplasty is extra-axial collection of fluid, air, hemorrhage, smaller in size; stable small R frontal subdural hypodense collection; improved small hemorrhages in posterior R frontal lobe; no new hemorrhage   -neurosx following   -cont keppra bid   -Was restarted on AC and then repeat CT head showed increase in right frontal IPH  -Patient was then again transferred to NSICU - Given andexxa  -PT/OT plan for [facility]  - CTH repeated yesterday with increased size of ventricle. Repeat CTH stable.     paroxysmal afib  HTN, HLD   CAD   -cont metoprolol, diltiazem BID   -cont statin   -cont prn labetalol, hydralazine   -Can resume asa on 12/23 per NS - Recommend against restarting of AC given 2 seperate bleeds. Outpatient follow up with Cardiology    Acute LUE DVT   -US LUE: stable partially occlusive L axillary vein thrombosis and L cephalic vein superficial thrombophlebitis; segmental occlusive thrombosis within 1 of 2 brachial veins AV fistula surrounded by thrombosis vs pseudoaneurysm   -Ac has to be held  -avoid heparin products 2/2 hx of HIT   -BLE SCDs - Started on Fondaparinux  - Discussed with IR - no indication for any filter such SVC.     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 Initially had hemicraniectomy course complicated by RVR, aspiration PNA, hypoxic respiratory failure, LUE DVT and questionable increase in AAA size. Started on AC with waxing/waning mental status prompting head imaging with evidence of new bleed. Cardiology following. Neurology consulted after MRI showed stroke. Echo done concerning for mobile echodensity on aortic valve and strokes in multiple arterial territories; WHIT recommended but as noted documented but family declined. Patient was downgraded to medical floor and later upgraded to neuro ICU after cranioplasty; subgaleal drain was removed 12/10. Patient found to have low grade fever, tachycardic, blood culture sent, UA noted for pyuria. Downgraded to medicine team 12/10. After discussion with interdisciplinary teams, Eliquis and ASA was restarted. CT head obtained on 12/15 showed new increase in right frontal IPH. Upgraded again to neuroICU. Was given andexxa for reversal. Repeat CT heads are now stable. Now deemed stable to downgrad again to medicine.     IPH  -s/p decompressive R hemicraniotomy 11/10 and s/p R cranioplasty with replacement of bone flap 12/7  -additional repeat CTH 12/11 for change in neuro exam; stable   -s/p subgaleal drain removed 12/10   -CTH 12/14 s/p R frontal prosthetic cranioplasty, adjacent to cranioplasty is extra-axial collection of fluid, air, hemorrhage, smaller in size; stable small R frontal subdural hypodense collection; improved small hemorrhages in posterior R frontal lobe; no new hemorrhage   -neurosx following   -cont keppra bid   -Was restarted on AC and then repeat CT head showed increase in right frontal IPH  -Patient was then again transferred to NSICU - Given andexxa  -PT/OT plan for [facility]  - CTH repeated yesterday with increased size of ventricle. Repeat CTH stable.     paroxysmal afib  HTN, HLD   CAD   -cont metoprolol, diltiazem BID   -cont statin   -cont prn labetalol, hydralazine   -Can resume asa on 12/23 per NS - Recommend against restarting of AC given 2 seperate bleeds. Outpatient follow up with Cardiology    Acute LUE DVT   -US LUE: stable partially occlusive L axillary vein thrombosis and L cephalic vein superficial thrombophlebitis; segmental occlusive thrombosis within 1 of 2 brachial veins AV fistula surrounded by thrombosis vs pseudoaneurysm   -Ac has to be held  -avoid heparin products 2/2 hx of HIT   -BLE SCDs - Started on Fondaparinux  - Discussed with IR - no indication for any filter such SVC.     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

## 2023-12-19 NOTE — PROGRESS NOTE ADULT - SUBJECTIVE AND OBJECTIVE BOX
HPI:  82y M with PMH HTN, CAD s/p stents, renal cell carcinoma status post left nephrectomy, bladder CA, BPH, CHF, LBBB, vertigo,  HLD, 5.5cm AAA without rupture post EVAR, paroxysmal A-fib on Eliquis, Plavix, and aspirin, early stage Alzheimer dementia transferred from [facility] s/p unwitnessed fall with head strike at home found by wife on floor at 8am. Patient brought to urgent care by wife and had 5 staples to posterior scalp but was instructed to go to nearest ED.  CT head at [place] showed R frontal IPH, SDH, SAH at 9:00. CTA stroke protocol not performed at [facility]. Patient BIB on 6L NC.  Pt GCS 15 on arrival, A&Ox2 on arrival. After initial assessment, patient noted to be vomiting enroute to CT scanner   (10 Nov 2023 11:04)    INTERVAL HPI  11/10 Hemicraniectomy  11/26 Re-upgraded to NSICU for new acute anterior falx SDH  12/7 Right Cranioplasty  12/10 Drain removed  12/16 - 24hr CTH obtained after starting Eliquis 5mg BID showed new R frontal ICH. Eliquis; ASA dc. 4hr CTH worsening R frontal ICH. Pt upgraded to NeuroICU. Reversed with Andexxa. Repeat CTH unchanged.  12/17 Downgraded out of the NSICU      OVERNIGHT EVENTS:  12/18 PM CTH with slight increase in ventricular size  12/19 CTH Stable     84 yo male seen and examined in bed NAD. Patient without complaints at this time.     Vital Signs Last 24 Hrs  T(C): 37.1 (19 Dec 2023 08:29), Max: 37.8 (18 Dec 2023 20:00)  T(F): 98.8 (19 Dec 2023 08:29), Max: 100.1 (18 Dec 2023 20:00)  HR: 116 (19 Dec 2023 10:00) (86 - 125)  BP: 141/66 (19 Dec 2023 10:00) (119/68 - 150/79)  BP(mean): 84 (19 Dec 2023 10:00) (70 - 102)  RR: 20 (19 Dec 2023 10:00) (18 - 23)  SpO2: 95% (19 Dec 2023 10:00) (92% - 99%)    Parameters below as of 19 Dec 2023 08:00  Patient On (Oxygen Delivery Method): room air      PHYSICAL EXAM:  GENERAL: NAD  HEAD: Incision, clean and dry, well approximated   WOUND: Dressing clean dry intact   MENTAL STATUS: AAO x 2, Opens eyes spontaneously,Conversant without aphasia, following simple commands  CRANIAL NERVES: PERRL, EOMI without nystagmus. Mild L facial.  Tongue is midline. Hearing grossly intact.   MOTOR: strength 4+/5 RUE. RLE bending knee, LUE/LLE trace WD.    SENSATION: dec L sided sensation. grossly intact on R sided  SKIN: Warm, dry    LABS:                        8.7    7.79  )-----------( 204      ( 19 Dec 2023 06:05 )             27.5     12-19    138  |  103  |  15.0  ----------------------------<  103<H>  4.2   |  23.0  |  0.84    Ca    9.2      19 Dec 2023 06:05  Phos  4.0     12-18  Mg     1.8     12-18    TPro  6.2<L>  /  Alb  2.9<L>  /  TBili  0.2<L>  /  DBili  x   /  AST  14  /  ALT  8   /  AlkPhos  143<H>  12-19      Urinalysis Basic - ( 19 Dec 2023 06:05 )    Color: x / Appearance: x / SG: x / pH: x  Gluc: 103 mg/dL / Ketone: x  / Bili: x / Urobili: x   Blood: x / Protein: x / Nitrite: x   Leuk Esterase: x / RBC: x / WBC x   Sq Epi: x / Non Sq Epi: x / Bacteria: x    12-18 @ 07:01  -  12-19 @ 07:00  --------------------------------------------------------  IN: 0 mL / OUT: 1000 mL / NET: -1000 mL    RADIOLOGY & ADDITIONAL TESTS:  < from: CT Head No Cont (12.19.23 @ 00:59) >  IMPRESSION:    1.  Grossly stable hemorrhagic foci within the right frontal lobe.  2.  Postoperative changes related to a prior right calvarial craniectomy   with cranioplasty. Stable mixed density subdural hematoma deep to the   craniectomy bed.  3.  Subgaleal fluid collection overlying the cranioplasty, mildly   increased in size. This could be postoperative in nature, underlying CSF   leak is not entirely excluded.  4.  Prominent ventricular size, however grossly stable.    --- End of Report ---    TAMMIE FARRELL MD; Attending Radiologist  This document has been electronically signed. Dec 19 2023  8:55AM    < end of copied text >   HPI:  82y M with PMH HTN, CAD s/p stents, renal cell carcinoma status post left nephrectomy, bladder CA, BPH, CHF, LBBB, vertigo,  HLD, 5.5cm AAA without rupture post EVAR, paroxysmal A-fib on Eliquis, Plavix, and aspirin, early stage Alzheimer dementia transferred from [facility] s/p unwitnessed fall with head strike at home found by wife on floor at 8am. Patient brought to urgent care by wife and had 5 staples to posterior scalp but was instructed to go to nearest ED.  CT head at [place] showed R frontal IPH, SDH, SAH at 9:00. CTA stroke protocol not performed at [facility]. Patient BIB on 6L NC.  Pt GCS 15 on arrival, A&Ox2 on arrival. After initial assessment, patient noted to be vomiting enroute to CT scanner   (10 Nov 2023 11:04)    INTERVAL HPI  11/10 Hemicraniectomy  11/26 Re-upgraded to NSICU for new acute anterior falx SDH  12/7 Right Cranioplasty  12/10 Drain removed  12/16 - 24hr CTH obtained after starting Eliquis 5mg BID showed new R frontal ICH. Eliquis; ASA dc. 4hr CTH worsening R frontal ICH. Pt upgraded to NeuroICU. Reversed with Andexxa. Repeat CTH unchanged.  12/17 Downgraded out of the NSICU      OVERNIGHT EVENTS:  12/18 PM CTH with slight increase in ventricular size  12/19 CTH Stable     82 yo male seen and examined in bed NAD. Patient without complaints at this time.     Vital Signs Last 24 Hrs  T(C): 37.1 (19 Dec 2023 08:29), Max: 37.8 (18 Dec 2023 20:00)  T(F): 98.8 (19 Dec 2023 08:29), Max: 100.1 (18 Dec 2023 20:00)  HR: 116 (19 Dec 2023 10:00) (86 - 125)  BP: 141/66 (19 Dec 2023 10:00) (119/68 - 150/79)  BP(mean): 84 (19 Dec 2023 10:00) (70 - 102)  RR: 20 (19 Dec 2023 10:00) (18 - 23)  SpO2: 95% (19 Dec 2023 10:00) (92% - 99%)    Parameters below as of 19 Dec 2023 08:00  Patient On (Oxygen Delivery Method): room air      PHYSICAL EXAM:  GENERAL: NAD  HEAD: Incision, clean and dry, well approximated   WOUND: Dressing clean dry intact   MENTAL STATUS: AAO x 2, Opens eyes spontaneously,Conversant without aphasia, following simple commands  CRANIAL NERVES: PERRL, EOMI without nystagmus. Mild L facial.  Tongue is midline. Hearing grossly intact.   MOTOR: strength 4+/5 RUE. RLE bending knee, LUE/LLE trace WD.    SENSATION: dec L sided sensation. grossly intact on R sided  SKIN: Warm, dry    LABS:                        8.7    7.79  )-----------( 204      ( 19 Dec 2023 06:05 )             27.5     12-19    138  |  103  |  15.0  ----------------------------<  103<H>  4.2   |  23.0  |  0.84    Ca    9.2      19 Dec 2023 06:05  Phos  4.0     12-18  Mg     1.8     12-18    TPro  6.2<L>  /  Alb  2.9<L>  /  TBili  0.2<L>  /  DBili  x   /  AST  14  /  ALT  8   /  AlkPhos  143<H>  12-19      Urinalysis Basic - ( 19 Dec 2023 06:05 )    Color: x / Appearance: x / SG: x / pH: x  Gluc: 103 mg/dL / Ketone: x  / Bili: x / Urobili: x   Blood: x / Protein: x / Nitrite: x   Leuk Esterase: x / RBC: x / WBC x   Sq Epi: x / Non Sq Epi: x / Bacteria: x    12-18 @ 07:01  -  12-19 @ 07:00  --------------------------------------------------------  IN: 0 mL / OUT: 1000 mL / NET: -1000 mL    RADIOLOGY & ADDITIONAL TESTS:  < from: CT Head No Cont (12.19.23 @ 00:59) >  IMPRESSION:    1.  Grossly stable hemorrhagic foci within the right frontal lobe.  2.  Postoperative changes related to a prior right calvarial craniectomy   with cranioplasty. Stable mixed density subdural hematoma deep to the   craniectomy bed.  3.  Subgaleal fluid collection overlying the cranioplasty, mildly   increased in size. This could be postoperative in nature, underlying CSF   leak is not entirely excluded.  4.  Prominent ventricular size, however grossly stable.    --- End of Report ---    TAMMIE FARRELL MD; Attending Radiologist  This document has been electronically signed. Dec 19 2023  8:55AM    < end of copied text >

## 2023-12-19 NOTE — PROGRESS NOTE ADULT - ASSESSMENT
83M w/ PMH HTN, CAD s/p stents on ASA/Plavix, RCC s/p L nephrectomy, bladder CA, BPH, CHF, LBBB, vertigo, HLD, 5.5 cm AAA without rupture post EVAR, paroxysmal afib on Eliquis, early stage Alzheimer's dementia, presented to [facility] 11/10/23 s/p unwitnessed fall with head strike at home found by wife, found with multicompartmental ICH, s/p R craniectomy 11/10/23, course significant for Klebsiella pneumonia, hypernatremia, lethargy, LUE DVT, new anterior falcine SDH.  12/7 Right Cranioplasty    Plan:  - Medical management per medicine  -Imaging reviewed during am rounds with Dr. Larios  -Patient seen and examined on AM rounds with Dr. Larios  - Q2h neuro checks  - Continue to hold Eliquis and ASA  -Craniotomy incision wrapped, continue to monitor dressing  - HOB 30 degrees   - PT/OT  -Neurosurgery will continue to follow, no additional scans at this time unless clinically indicated  83M w/ PMH HTN, CAD s/p stents on ASA/Plavix, RCC s/p L nephrectomy, bladder CA, BPH, CHF, LBBB, vertigo, HLD, 5.5 cm AAA without rupture post EVAR, paroxysmal afib on Eliquis, early stage Alzheimer's dementia, presented to [facility] 11/10/23 s/p unwitnessed fall with head strike at home found by wife, found with multicompartmental ICH, s/p R craniectomy 11/10/23, course significant for Klebsiella pneumonia, hypernatremia, lethargy, LUE DVT, new anterior falcine SDH.  12/7 Right Cranioplasty    Plan:  - Medical management per medicine  -Imaging reviewed during am rounds with Dr. Larios  -Patient seen and examined on AM rounds with Dr. Larios  - Q2h neuro checks  - Continue to hold Eliquis and ASA  -Craniotomy incision wrapped, continue to monitor dressing  - HOB 30 degrees   - PT/OT  -Neurosurgery will continue to follow, no additional scans at this time unless clinically indicated

## 2023-12-19 NOTE — PROGRESS NOTE ADULT - SUBJECTIVE AND OBJECTIVE BOX
[facility] Division of Hospital Medicine  Jc Cedillo MD    Chief Complaint:  Patient is a 83y old  Male who presents with a chief complaint of s/p unwitnessed fall with head strike (19 Dec 2023 10:41)      SUBJECTIVE / OVERNIGHT EVENTS:  Patient seen and examined at bedside. No acute events reported overnight. No new complaints.    MEDICATIONS  (STANDING):  atorvastatin 80 milliGRAM(s) Oral at bedtime  chlorhexidine 2% Cloths 1 Application(s) Topical <User Schedule>  diltiazem    Tablet 60 milliGRAM(s) Oral <User Schedule>  doxazosin 2 milliGRAM(s) Oral at bedtime  folic acid 1 milliGRAM(s) Oral daily  fondaparinux Injectable 2.5 milliGRAM(s) SubCutaneous <User Schedule>  levETIRAcetam  IVPB 500 milliGRAM(s) IV Intermittent every 12 hours  melatonin 5 milliGRAM(s) Oral at bedtime  memantine 5 milliGRAM(s) Oral two times a day  metoprolol tartrate 50 milliGRAM(s) Oral <User Schedule>  modafinil 100 milliGRAM(s) Oral <User Schedule>  Nephro-roma 1 Tablet(s) Oral daily  polyethylene glycol 3350 17 Gram(s) Oral daily  senna 2 Tablet(s) Oral at bedtime    MEDICATIONS  (PRN):  acetaminophen     Tablet .. 650 milliGRAM(s) Oral every 6 hours PRN Temp greater or equal to 38C (100.4F), Mild Pain (1 - 3)  artificial tears (preservative free) Ophthalmic Solution 1 Drop(s) Both EYES every 6 hours PRN Dry Eyes  hydrALAZINE Injectable 10 milliGRAM(s) IV Push every 2 hours PRN SBP > 160 mmHg  labetalol Injectable 10 milliGRAM(s) IV Push every 2 hours PRN Systolic blood pressure > 160  ondansetron Injectable 4 milliGRAM(s) IV Push every 6 hours PRN Nausea and/or Vomiting        I&O's Summary    18 Dec 2023 07:01  -  19 Dec 2023 07:00  --------------------------------------------------------  IN: 0 mL / OUT: 1000 mL / NET: -1000 mL    19 Dec 2023 07:01  -  19 Dec 2023 12:02  --------------------------------------------------------  IN: 0 mL / OUT: 1200 mL / NET: -1200 mL        PHYSICAL EXAM:  Vital Signs Last 24 Hrs  T(C): 37.1 (19 Dec 2023 11:33), Max: 37.8 (18 Dec 2023 20:00)  T(F): 98.8 (19 Dec 2023 11:33), Max: 100.1 (18 Dec 2023 20:00)  HR: 118 (19 Dec 2023 11:33) (106 - 125)  BP: 148/96 (19 Dec 2023 11:33) (119/68 - 150/79)  BP(mean): 101 (19 Dec 2023 11:33) (70 - 102)  RR: 16 (19 Dec 2023 11:33) (16 - 23)  SpO2: 98% (19 Dec 2023 11:33) (92% - 99%)    Parameters below as of 19 Dec 2023 11:33  Patient On (Oxygen Delivery Method): room air        CONSTITUTIONAL: NAD  HEENT: Craniotomy incision appears clean  RESPIRATORY: CTA bilaterally, normal effort  CARDIOVASCULAR: RRR, S1/S2+, no m/g/r  ABDOMEN: Nontender to palpation, normoactive bowel sounds, no rebound/guarding  MUSCULOSKELETAL: No edema, cyanosis or deformities.  PSYCH: Calm, affect appropriate.  NEUROLOGY: Awake, alert, RUE is 4/5 and RLE is 2-3/5, LUE/LLE withdrawal to pain  SKIN: No rashes; no palpable lesions  VASC: Distal pulses palpable    LABS:                        8.7    7.79  )-----------( 204      ( 19 Dec 2023 06:05 )             27.5     12-19    138  |  103  |  15.0  ----------------------------<  103<H>  4.2   |  23.0  |  0.84    Ca    9.2      19 Dec 2023 06:05  Phos  4.0     12-18  Mg     1.8     12-18    TPro  6.2<L>  /  Alb  2.9<L>  /  TBili  0.2<L>  /  DBili  x   /  AST  14  /  ALT  8   /  AlkPhos  143<H>  12-19          Urinalysis Basic - ( 19 Dec 2023 06:05 )    Color: x / Appearance: x / SG: x / pH: x  Gluc: 103 mg/dL / Ketone: x  / Bili: x / Urobili: x   Blood: x / Protein: x / Nitrite: x   Leuk Esterase: x / RBC: x / WBC x   Sq Epi: x / Non Sq Epi: x / Bacteria: x        CAPILLARY BLOOD GLUCOSE            RADIOLOGY & ADDITIONAL TESTS:  Results Reviewed:   Imaging Personally Reviewed:  Electrocardiogram Personally Reviewed:                                           [facility] Division of Hospital Medicine  Jc Cedillo MD    Chief Complaint:  Patient is a 83y old  Male who presents with a chief complaint of s/p unwitnessed fall with head strike (19 Dec 2023 10:41)      SUBJECTIVE / OVERNIGHT EVENTS:  Patient seen and examined at bedside. No acute events reported overnight. No new complaints.    MEDICATIONS  (STANDING):  atorvastatin 80 milliGRAM(s) Oral at bedtime  chlorhexidine 2% Cloths 1 Application(s) Topical <User Schedule>  diltiazem    Tablet 60 milliGRAM(s) Oral <User Schedule>  doxazosin 2 milliGRAM(s) Oral at bedtime  folic acid 1 milliGRAM(s) Oral daily  fondaparinux Injectable 2.5 milliGRAM(s) SubCutaneous <User Schedule>  levETIRAcetam  IVPB 500 milliGRAM(s) IV Intermittent every 12 hours  melatonin 5 milliGRAM(s) Oral at bedtime  memantine 5 milliGRAM(s) Oral two times a day  metoprolol tartrate 50 milliGRAM(s) Oral <User Schedule>  modafinil 100 milliGRAM(s) Oral <User Schedule>  Nephro-roma 1 Tablet(s) Oral daily  polyethylene glycol 3350 17 Gram(s) Oral daily  senna 2 Tablet(s) Oral at bedtime    MEDICATIONS  (PRN):  acetaminophen     Tablet .. 650 milliGRAM(s) Oral every 6 hours PRN Temp greater or equal to 38C (100.4F), Mild Pain (1 - 3)  artificial tears (preservative free) Ophthalmic Solution 1 Drop(s) Both EYES every 6 hours PRN Dry Eyes  hydrALAZINE Injectable 10 milliGRAM(s) IV Push every 2 hours PRN SBP > 160 mmHg  labetalol Injectable 10 milliGRAM(s) IV Push every 2 hours PRN Systolic blood pressure > 160  ondansetron Injectable 4 milliGRAM(s) IV Push every 6 hours PRN Nausea and/or Vomiting        I&O's Summary    18 Dec 2023 07:01  -  19 Dec 2023 07:00  --------------------------------------------------------  IN: 0 mL / OUT: 1000 mL / NET: -1000 mL    19 Dec 2023 07:01  -  19 Dec 2023 12:02  --------------------------------------------------------  IN: 0 mL / OUT: 1200 mL / NET: -1200 mL        PHYSICAL EXAM:  Vital Signs Last 24 Hrs  T(C): 37.1 (19 Dec 2023 11:33), Max: 37.8 (18 Dec 2023 20:00)  T(F): 98.8 (19 Dec 2023 11:33), Max: 100.1 (18 Dec 2023 20:00)  HR: 118 (19 Dec 2023 11:33) (106 - 125)  BP: 148/96 (19 Dec 2023 11:33) (119/68 - 150/79)  BP(mean): 101 (19 Dec 2023 11:33) (70 - 102)  RR: 16 (19 Dec 2023 11:33) (16 - 23)  SpO2: 98% (19 Dec 2023 11:33) (92% - 99%)    Parameters below as of 19 Dec 2023 11:33  Patient On (Oxygen Delivery Method): room air        CONSTITUTIONAL: NAD  HEENT: Craniotomy incision appears clean  RESPIRATORY: CTA bilaterally, normal effort  CARDIOVASCULAR: RRR, S1/S2+, no m/g/r  ABDOMEN: Nontender to palpation, normoactive bowel sounds, no rebound/guarding  MUSCULOSKELETAL: No edema, cyanosis or deformities.  PSYCH: Calm, affect appropriate.  NEUROLOGY: Awake, alert, RUE is 4/5 and RLE is 2-3/5, LUE/LLE withdrawal to pain  SKIN: No rashes; no palpable lesions  VASC: Distal pulses palpable    LABS:                        8.7    7.79  )-----------( 204      ( 19 Dec 2023 06:05 )             27.5     12-19    138  |  103  |  15.0  ----------------------------<  103<H>  4.2   |  23.0  |  0.84    Ca    9.2      19 Dec 2023 06:05  Phos  4.0     12-18  Mg     1.8     12-18    TPro  6.2<L>  /  Alb  2.9<L>  /  TBili  0.2<L>  /  DBili  x   /  AST  14  /  ALT  8   /  AlkPhos  143<H>  12-19          Urinalysis Basic - ( 19 Dec 2023 06:05 )    Color: x / Appearance: x / SG: x / pH: x  Gluc: 103 mg/dL / Ketone: x  / Bili: x / Urobili: x   Blood: x / Protein: x / Nitrite: x   Leuk Esterase: x / RBC: x / WBC x   Sq Epi: x / Non Sq Epi: x / Bacteria: x        CAPILLARY BLOOD GLUCOSE            RADIOLOGY & ADDITIONAL TESTS:  Results Reviewed:   Imaging Personally Reviewed:  Electrocardiogram Personally Reviewed:

## 2023-12-20 LAB
ALBUMIN SERPL ELPH-MCNC: 3 G/DL — LOW (ref 3.3–5.2)
ALBUMIN SERPL ELPH-MCNC: 3 G/DL — LOW (ref 3.3–5.2)
ALP SERPL-CCNC: 140 U/L — HIGH (ref 40–120)
ALP SERPL-CCNC: 140 U/L — HIGH (ref 40–120)
ALT FLD-CCNC: 8 U/L — SIGNIFICANT CHANGE UP
ALT FLD-CCNC: 8 U/L — SIGNIFICANT CHANGE UP
ANION GAP SERPL CALC-SCNC: 10 MMOL/L — SIGNIFICANT CHANGE UP (ref 5–17)
ANION GAP SERPL CALC-SCNC: 10 MMOL/L — SIGNIFICANT CHANGE UP (ref 5–17)
AST SERPL-CCNC: 15 U/L — SIGNIFICANT CHANGE UP
AST SERPL-CCNC: 15 U/L — SIGNIFICANT CHANGE UP
BILIRUB SERPL-MCNC: <0.2 MG/DL — LOW (ref 0.4–2)
BILIRUB SERPL-MCNC: <0.2 MG/DL — LOW (ref 0.4–2)
BLD GP AB SCN SERPL QL: SIGNIFICANT CHANGE UP
BLD GP AB SCN SERPL QL: SIGNIFICANT CHANGE UP
BUN SERPL-MCNC: 22.1 MG/DL — HIGH (ref 8–20)
BUN SERPL-MCNC: 22.1 MG/DL — HIGH (ref 8–20)
CALCIUM SERPL-MCNC: 9 MG/DL — SIGNIFICANT CHANGE UP (ref 8.4–10.5)
CALCIUM SERPL-MCNC: 9 MG/DL — SIGNIFICANT CHANGE UP (ref 8.4–10.5)
CHLORIDE SERPL-SCNC: 107 MMOL/L — SIGNIFICANT CHANGE UP (ref 96–108)
CHLORIDE SERPL-SCNC: 107 MMOL/L — SIGNIFICANT CHANGE UP (ref 96–108)
CO2 SERPL-SCNC: 23 MMOL/L — SIGNIFICANT CHANGE UP (ref 22–29)
CO2 SERPL-SCNC: 23 MMOL/L — SIGNIFICANT CHANGE UP (ref 22–29)
CREAT SERPL-MCNC: 0.89 MG/DL — SIGNIFICANT CHANGE UP (ref 0.5–1.3)
CREAT SERPL-MCNC: 0.89 MG/DL — SIGNIFICANT CHANGE UP (ref 0.5–1.3)
EGFR: 86 ML/MIN/1.73M2 — SIGNIFICANT CHANGE UP
EGFR: 86 ML/MIN/1.73M2 — SIGNIFICANT CHANGE UP
GLUCOSE SERPL-MCNC: 116 MG/DL — HIGH (ref 70–99)
GLUCOSE SERPL-MCNC: 116 MG/DL — HIGH (ref 70–99)
HCT VFR BLD CALC: 27.2 % — LOW (ref 39–50)
HCT VFR BLD CALC: 27.2 % — LOW (ref 39–50)
HGB BLD-MCNC: 8.7 G/DL — LOW (ref 13–17)
HGB BLD-MCNC: 8.7 G/DL — LOW (ref 13–17)
MCHC RBC-ENTMCNC: 31.8 PG — SIGNIFICANT CHANGE UP (ref 27–34)
MCHC RBC-ENTMCNC: 31.8 PG — SIGNIFICANT CHANGE UP (ref 27–34)
MCHC RBC-ENTMCNC: 32 GM/DL — SIGNIFICANT CHANGE UP (ref 32–36)
MCHC RBC-ENTMCNC: 32 GM/DL — SIGNIFICANT CHANGE UP (ref 32–36)
MCV RBC AUTO: 99.3 FL — SIGNIFICANT CHANGE UP (ref 80–100)
MCV RBC AUTO: 99.3 FL — SIGNIFICANT CHANGE UP (ref 80–100)
PLATELET # BLD AUTO: 266 K/UL — SIGNIFICANT CHANGE UP (ref 150–400)
PLATELET # BLD AUTO: 266 K/UL — SIGNIFICANT CHANGE UP (ref 150–400)
POTASSIUM SERPL-MCNC: 4.3 MMOL/L — SIGNIFICANT CHANGE UP (ref 3.5–5.3)
POTASSIUM SERPL-MCNC: 4.3 MMOL/L — SIGNIFICANT CHANGE UP (ref 3.5–5.3)
POTASSIUM SERPL-SCNC: 4.3 MMOL/L — SIGNIFICANT CHANGE UP (ref 3.5–5.3)
POTASSIUM SERPL-SCNC: 4.3 MMOL/L — SIGNIFICANT CHANGE UP (ref 3.5–5.3)
PROCALCITONIN SERPL-MCNC: 0.15 NG/ML — HIGH (ref 0.02–0.1)
PROCALCITONIN SERPL-MCNC: 0.15 NG/ML — HIGH (ref 0.02–0.1)
PROT SERPL-MCNC: 5.9 G/DL — LOW (ref 6.6–8.7)
PROT SERPL-MCNC: 5.9 G/DL — LOW (ref 6.6–8.7)
RAPID RVP RESULT: SIGNIFICANT CHANGE UP
RAPID RVP RESULT: SIGNIFICANT CHANGE UP
RBC # BLD: 2.74 M/UL — LOW (ref 4.2–5.8)
RBC # BLD: 2.74 M/UL — LOW (ref 4.2–5.8)
RBC # FLD: 15 % — HIGH (ref 10.3–14.5)
RBC # FLD: 15 % — HIGH (ref 10.3–14.5)
SARS-COV-2 RNA SPEC QL NAA+PROBE: SIGNIFICANT CHANGE UP
SARS-COV-2 RNA SPEC QL NAA+PROBE: SIGNIFICANT CHANGE UP
SODIUM SERPL-SCNC: 140 MMOL/L — SIGNIFICANT CHANGE UP (ref 135–145)
SODIUM SERPL-SCNC: 140 MMOL/L — SIGNIFICANT CHANGE UP (ref 135–145)
WBC # BLD: 9.94 K/UL — SIGNIFICANT CHANGE UP (ref 3.8–10.5)
WBC # BLD: 9.94 K/UL — SIGNIFICANT CHANGE UP (ref 3.8–10.5)
WBC # FLD AUTO: 9.94 K/UL — SIGNIFICANT CHANGE UP (ref 3.8–10.5)
WBC # FLD AUTO: 9.94 K/UL — SIGNIFICANT CHANGE UP (ref 3.8–10.5)

## 2023-12-20 PROCEDURE — 99233 SBSQ HOSP IP/OBS HIGH 50: CPT

## 2023-12-20 PROCEDURE — 70450 CT HEAD/BRAIN W/O DYE: CPT | Mod: 26

## 2023-12-20 PROCEDURE — 71045 X-RAY EXAM CHEST 1 VIEW: CPT | Mod: 26

## 2023-12-20 RX ORDER — SODIUM CHLORIDE 9 MG/ML
500 INJECTION INTRAMUSCULAR; INTRAVENOUS; SUBCUTANEOUS ONCE
Refills: 0 | Status: COMPLETED | OUTPATIENT
Start: 2023-12-20 | End: 2023-12-20

## 2023-12-20 RX ADMIN — LEVETIRACETAM 400 MILLIGRAM(S): 250 TABLET, FILM COATED ORAL at 06:38

## 2023-12-20 RX ADMIN — Medication 650 MILLIGRAM(S): at 03:52

## 2023-12-20 RX ADMIN — MODAFINIL 100 MILLIGRAM(S): 200 TABLET ORAL at 09:41

## 2023-12-20 RX ADMIN — Medication 650 MILLIGRAM(S): at 03:22

## 2023-12-20 RX ADMIN — Medication 5 MILLIGRAM(S): at 22:47

## 2023-12-20 RX ADMIN — Medication 2 MILLIGRAM(S): at 22:49

## 2023-12-20 RX ADMIN — SENNA PLUS 2 TABLET(S): 8.6 TABLET ORAL at 22:48

## 2023-12-20 RX ADMIN — FONDAPARINUX SODIUM 2.5 MILLIGRAM(S): 2.5 INJECTION, SOLUTION SUBCUTANEOUS at 03:20

## 2023-12-20 RX ADMIN — MEMANTINE HYDROCHLORIDE 5 MILLIGRAM(S): 10 TABLET ORAL at 06:39

## 2023-12-20 RX ADMIN — ATORVASTATIN CALCIUM 80 MILLIGRAM(S): 80 TABLET, FILM COATED ORAL at 22:48

## 2023-12-20 RX ADMIN — FONDAPARINUX SODIUM 2.5 MILLIGRAM(S): 2.5 INJECTION, SOLUTION SUBCUTANEOUS at 16:38

## 2023-12-20 RX ADMIN — CHLORHEXIDINE GLUCONATE 1 APPLICATION(S): 213 SOLUTION TOPICAL at 06:43

## 2023-12-20 RX ADMIN — Medication 1 TABLET(S): at 12:48

## 2023-12-20 RX ADMIN — Medication 60 MILLIGRAM(S): at 16:38

## 2023-12-20 RX ADMIN — MEMANTINE HYDROCHLORIDE 5 MILLIGRAM(S): 10 TABLET ORAL at 16:37

## 2023-12-20 RX ADMIN — LEVETIRACETAM 400 MILLIGRAM(S): 250 TABLET, FILM COATED ORAL at 16:47

## 2023-12-20 RX ADMIN — SODIUM CHLORIDE 1000 MILLILITER(S): 9 INJECTION INTRAMUSCULAR; INTRAVENOUS; SUBCUTANEOUS at 05:05

## 2023-12-20 RX ADMIN — Medication 1 MILLIGRAM(S): at 09:41

## 2023-12-20 RX ADMIN — Medication 50 MILLIGRAM(S): at 12:50

## 2023-12-20 NOTE — PHYSICAL THERAPY INITIAL EVALUATION ADULT - IMPAIRMENTS CONTRIBUTING IMPAIRED BED MOBILITY, REHAB EVAL
impaired balance/impaired postural control/decreased ROM/decreased strength
impaired balance/impaired coordination/impaired postural control/decreased ROM/decreased strength
impaired balance/impaired postural control/decreased strength
impaired balance/cognition/decreased strength

## 2023-12-20 NOTE — PROGRESS NOTE ADULT - SUBJECTIVE AND OBJECTIVE BOX
HPI:  82y M with PMH HTN, CAD s/p stents, renal cell carcinoma status post left nephrectomy, bladder CA, BPH, CHF, LBBB, vertigo,  HLD, 5.5cm AAA without rupture post EVAR, paroxysmal A-fib on Eliquis, Plavix, and aspirin, early stage Alzheimer dementia transferred from [facility] s/p unwitnessed fall with head strike at home found by wife on floor at 8am. Patient brought to urgent care by wife and had 5 staples to posterior scalp but was instructed to go to nearest ED.  CT head at [place] showed R frontal IPH, SDH, SAH at 9:00. CTA stroke protocol not performed at [facility]. Patient BIB on 6L NC.  Pt GCS 15 on arrival, A&Ox2 on arrival. After initial assessment, patient noted to be vomiting enroute to CT scanner.    (10 Nov 2023 11:04)    INTERVAL HPI:  11/10 Right hemicraniectomy  11/26 Re upgraded to NSICU for an acute new anterior falx SDH  12/7 Right Cranioplasty  12/10 Drain Dc'd  12/14 Started on Eliquis  12/15 Rpt CTH shows new right frontal IPH, rpt CTH slight increase  12/16 Rpt CTH Stable  12/17 Downgraded from NSICU    OVERNIGHT EVENTS:  -Reportedly had increased lethargy and was taken for a STAT CTH    82 yo male seen and examined in bed, NAD. Patient denies headaches or weakness,.    Vital Signs Last 24 Hrs  T(C): 37.2 (20 Dec 2023 12:00), Max: 38 (20 Dec 2023 00:24)  T(F): 99 (20 Dec 2023 12:00), Max: 100.4 (20 Dec 2023 00:24)  HR: 91 (20 Dec 2023 12:00) (88 - 107)  BP: 119/51 (20 Dec 2023 12:00) (80/50 - 147/58)  BP(mean): 65 (20 Dec 2023 12:00) (60 - 100)  RR: 20 (20 Dec 2023 12:00) (20 - 25)  SpO2: 98% (20 Dec 2023 12:00) (95% - 98%)    Parameters below as of 20 Dec 2023 12:00  Patient On (Oxygen Delivery Method): room air    PHYSICAL EXAM:  GENERAL: NAD  HEAD:  S/p R crani. Right craniectomy site dry and intact, no drainage noted on palpation  JOEY COMA SCORE: E-4 V-4 M-6 = 14  MENTAL STATUS: lethargic but awakens to voice, oriented x1-2 (name, place w/ choice), answers simple questions, following simple commands on R, spontaneous eye opening, following most EOM.  CRANIAL NERVES: PERRL, EOMI without nystagmus. Mild L facial.  Tongue is midline. Hearing grossly intact.   MOTOR: strength 4+/5 RUE. RLE bending knee, LUE/LLE trace WD.    SENSATION: dec L sided sensation. grossly intact on R sided  SKIN: Warm, dry    LABS:                        8.7    9.94  )-----------( 266      ( 20 Dec 2023 07:32 )             27.2     12-20    140  |  107  |  22.1<H>  ----------------------------<  116<H>  4.3   |  23.0  |  0.89    Ca    9.0      20 Dec 2023 07:32    TPro  5.9<L>  /  Alb  3.0<L>  /  TBili  <0.2<L>  /  DBili  x   /  AST  15  /  ALT  8   /  AlkPhos  140<H>  12-20      Urinalysis Basic - ( 20 Dec 2023 07:32 )    Color: x / Appearance: x / SG: x / pH: x  Gluc: 116 mg/dL / Ketone: x  / Bili: x / Urobili: x   Blood: x / Protein: x / Nitrite: x   Leuk Esterase: x / RBC: x / WBC x   Sq Epi: x / Non Sq Epi: x / Bacteria: x    12-19 @ 07:01  -  12-20 @ 07:00  --------------------------------------------------------  IN: 0 mL / OUT: 1200 mL / NET: -1200 mL    RADIOLOGY & ADDITIONAL TESTS:  < from: CT Head No Cont (12.20.23 @ 09:05) >  IMPRESSION: Stable intracranial hemorrhages.    --- End of Report ---  AUSTIN RIVERA MD; Attending Radiologist  This document has been electronically signed. Dec 20 2023 10:03AM    < end of copied text >   HPI:  82y M with PMH HTN, CAD s/p stents, renal cell carcinoma status post left nephrectomy, bladder CA, BPH, CHF, LBBB, vertigo,  HLD, 5.5cm AAA without rupture post EVAR, paroxysmal A-fib on Eliquis, Plavix, and aspirin, early stage Alzheimer dementia transferred from [facility] s/p unwitnessed fall with head strike at home found by wife on floor at 8am. Patient brought to urgent care by wife and had 5 staples to posterior scalp but was instructed to go to nearest ED.  CT head at [place] showed R frontal IPH, SDH, SAH at 9:00. CTA stroke protocol not performed at [facility]. Patient BIB on 6L NC.  Pt GCS 15 on arrival, A&Ox2 on arrival. After initial assessment, patient noted to be vomiting enroute to CT scanner.    (10 Nov 2023 11:04)    INTERVAL HPI:  11/10 Right hemicraniectomy  11/26 Re upgraded to NSICU for an acute new anterior falx SDH  12/7 Right Cranioplasty  12/10 Drain Dc'd  12/14 Started on Eliquis  12/15 Rpt CTH shows new right frontal IPH, rpt CTH slight increase  12/16 Rpt CTH Stable  12/17 Downgraded from NSICU    OVERNIGHT EVENTS:  -Reportedly had increased lethargy and was taken for a STAT CTH    82 yo male seen and examined in bed, NAD. Patient denies headaches or weakness,.    Vital Signs Last 24 Hrs  T(C): 37.2 (20 Dec 2023 12:00), Max: 38 (20 Dec 2023 00:24)  T(F): 99 (20 Dec 2023 12:00), Max: 100.4 (20 Dec 2023 00:24)  HR: 91 (20 Dec 2023 12:00) (88 - 107)  BP: 119/51 (20 Dec 2023 12:00) (80/50 - 147/58)  BP(mean): 65 (20 Dec 2023 12:00) (60 - 100)  RR: 20 (20 Dec 2023 12:00) (20 - 25)  SpO2: 98% (20 Dec 2023 12:00) (95% - 98%)    Parameters below as of 20 Dec 2023 12:00  Patient On (Oxygen Delivery Method): room air    PHYSICAL EXAM:  GENERAL: NAD  HEAD:  S/p R crani. Right craniectomy site dry and intact, no drainage noted on palpation  JOEY COMA SCORE: E-4 V-4 M-6 = 14  MENTAL STATUS: lethargic but awakens to voice, oriented x1-2 (name, place w/ choice), answers simple questions, following simple commands on R, spontaneous eye opening, following most EOM.  CRANIAL NERVES: PERRL, EOMI without nystagmus. Mild L facial.  Tongue is midline. Hearing grossly intact.   MOTOR: strength 4+/5 RUE. RLE bending knee, LUE/LLE trace WD.    SENSATION: dec L sided sensation. grossly intact on R sided  SKIN: Warm, dry    LABS:                        8.7    9.94  )-----------( 266      ( 20 Dec 2023 07:32 )             27.2     12-20    140  |  107  |  22.1<H>  ----------------------------<  116<H>  4.3   |  23.0  |  0.89    Ca    9.0      20 Dec 2023 07:32    TPro  5.9<L>  /  Alb  3.0<L>  /  TBili  <0.2<L>  /  DBili  x   /  AST  15  /  ALT  8   /  AlkPhos  140<H>  12-20      Urinalysis Basic - ( 20 Dec 2023 07:32 )    Color: x / Appearance: x / SG: x / pH: x  Gluc: 116 mg/dL / Ketone: x  / Bili: x / Urobili: x   Blood: x / Protein: x / Nitrite: x   Leuk Esterase: x / RBC: x / WBC x   Sq Epi: x / Non Sq Epi: x / Bacteria: x    12-19 @ 07:01  -  12-20 @ 07:00  --------------------------------------------------------  IN: 0 mL / OUT: 1200 mL / NET: -1200 mL    RADIOLOGY & ADDITIONAL TESTS:  < from: CT Head No Cont (12.20.23 @ 09:05) >  IMPRESSION: Stable intracranial hemorrhages.    --- End of Report ---  AUSTIN RIVERA MD; Attending Radiologist  This document has been electronically signed. Dec 20 2023 10:03AM    < end of copied text >

## 2023-12-20 NOTE — PROGRESS NOTE ADULT - ASSESSMENT
83M w/ PMH HTN, CAD s/p stents on ASA/Plavix, RCC s/p L nephrectomy, bladder CA, BPH, CHF, LBBB, vertigo, HLD, 5.5 cm AAA without rupture post EVAR, paroxysmal afib on Eliquis, early stage Alzheimer's dementia, presented to [facility] 11/10/23 s/p unwitnessed fall with head strike at home found by wife, found with multicompartmental ICH, s/p R craniectomy 11/10/23, course significant for Klebsiella pneumonia, hypernatremia, lethargy, LUE DVT, new anterior falcine SDH.  12/7 Right Cranioplasty  -CTH s/p starting eliquis showed new R frontal ICH. 4hr repeat CTH showed worsening R frontal ICH.   -Rpt CTH stable and patient was downgraded from NSICU    Plan:  - Medical management per medicine  - Q2h neuro checks  - Continue to hold Eliquis  -Craniotomy incision wrapped during AM Rounds with Dr. Larios, continue to monitor and record would drainage  - Keppra 500 BID  -DVT ppx: Continue Fondaparinux  -Continue bowel regimen   - HOB 30 degrees   - PT/OT  -Neurosurgery will continue to follow   - Patient seen and examined on AM rounds with Dr. Larios   83M w/ PMH HTN, CAD s/p stents on ASA/Plavix, RCC s/p L nephrectomy, bladder CA, BPH, CHF, LBBB, vertigo, HLD, 5.5 cm AAA without rupture post EVAR, paroxysmal afib on Eliquis, early stage Alzheimer's dementia, presented to [facility] 11/10/23 s/p unwitnessed fall with head strike at home found by wife, found with multicompartmental ICH, s/p R craniectomy 11/10/23, course significant for Klebsiella pneumonia, hypernatremia, lethargy, LUE DVT, new anterior falcine SDH.  12/7 Right Cranioplasty  -CTH s/p starting eliquis showed new R frontal ICH. 4hr repeat CTH showed worsening R frontal ICH.   -Rpt CTH stable and patient was downgraded from NSICU    Plan:  - Medical management per medicine  - Q2h neuro checks  - Continue to hold Eliquis  -Craniotomy incision wrapped during AM Rounds with Dr. Larios, continue to monitor and record would drainage  - Keppra 500 BID  -DVT ppx: Continue Fondaparinux  -Continue bowel regimen   - HOB 30 degrees   - PT/OT  -Neurosurgery will continue to follow   - Patient seen and examined on AM rounds with Dr. Larios

## 2023-12-20 NOTE — PHYSICAL THERAPY INITIAL EVALUATION ADULT - PHYSICAL ASSIST/NONPHYSICAL ASSIST: SUPINE/SIT, REHAB EVAL
2 person assist
verbal cues/nonverbal cues (demo/gestures)/2 person assist
verbal cues/nonverbal cues (demo/gestures)/1 person assist
verbal cues/nonverbal cues (demo/gestures)/1 person assist

## 2023-12-20 NOTE — PHYSICAL THERAPY INITIAL EVALUATION ADULT - LEVEL OF INDEPENDENCE: SUPINE/SIT, REHAB EVAL
maximum assist (25% patients effort)
+ helmet on/maximum assist (25% patients effort)
dependent (less than 25% patients effort)
maximum assist (25% patients effort)

## 2023-12-20 NOTE — CHART NOTE - NSCHARTNOTEFT_GEN_A_CORE
Called by RN for concern for worsening mental status. As per RN, patient only responsive to sternal rub.  BP also 80/50.  Patient seen and examined, in NAD. Lying in bed, with eyes open and responds "Jamari" when asked his name, does not answer his birthday. States he is at home when asked where he is. Does not respond to the year or president.  Squeezes fingers and wiggles toes on R side. Spontaneously moves R arm. Pupils reactive to light.   Repeat CT head ordered. Spoke with neurosurgery PA who said exam is consistent with what it was night prior but recommends repeat head CT  to monitor ventricle size.   500cc NS bolus ordered for hypotension and Type and Screen ordered since hemoglobin has been trending low.   Continue to monitor patient, notify PA of any changes in patient status.

## 2023-12-20 NOTE — PHYSICAL THERAPY INITIAL EVALUATION ADULT - BALANCE DISTURBANCE, SYSTEM IMPAIRMENT CONTRIBUTE, REHAB EVAL
cognitive/neuromuscular/musculoskeletal
cognitive/neuromuscular/musculoskeletal
cognitive/musculoskeletal

## 2023-12-20 NOTE — PHYSICAL THERAPY INITIAL EVALUATION ADULT - BED MOBILITY LIMITATIONS, REHAB EVAL
decreased ability to use arms for pushing/pulling/decreased ability to use legs for bridging/pushing
decreased ability to use arms for pushing/pulling/decreased ability to use legs for bridging/pushing
decreased ability to use legs for bridging/pushing

## 2023-12-20 NOTE — PHYSICAL THERAPY INITIAL EVALUATION ADULT - NAME OF DISCHARGE PLANNER
Crutch use reviewed. Use demonstrated and return demonstrated.        Jonita Bloch, RN  12/08/23 [id number] natali

## 2023-12-20 NOTE — PROGRESS NOTE ADULT - SUBJECTIVE AND OBJECTIVE BOX
[facility] Division of Hospital Medicine  Jc Cedillo MD    Chief Complaint:  Patient is a 83y old  Male who presents with a chief complaint of s/p unwitnessed fall with head strike (19 Dec 2023 11:59)      SUBJECTIVE / OVERNIGHT EVENTS:  Patient seen and examined at bedside. Overnight pt more lethargic, with BP of 80/50 and fever of 100.4. Fever work up ordered. Given 500cc NS bolus with improvement. CTH unchanged.     MEDICATIONS  (STANDING):  atorvastatin 80 milliGRAM(s) Oral at bedtime  chlorhexidine 2% Cloths 1 Application(s) Topical <User Schedule>  diltiazem    Tablet 60 milliGRAM(s) Oral <User Schedule>  doxazosin 2 milliGRAM(s) Oral at bedtime  folic acid 1 milliGRAM(s) Oral daily  fondaparinux Injectable 2.5 milliGRAM(s) SubCutaneous <User Schedule>  levETIRAcetam  IVPB 500 milliGRAM(s) IV Intermittent every 12 hours  melatonin 5 milliGRAM(s) Oral at bedtime  memantine 5 milliGRAM(s) Oral two times a day  metoprolol tartrate 50 milliGRAM(s) Oral <User Schedule>  modafinil 100 milliGRAM(s) Oral <User Schedule>  Nephro-roma 1 Tablet(s) Oral daily  polyethylene glycol 3350 17 Gram(s) Oral daily  senna 2 Tablet(s) Oral at bedtime    MEDICATIONS  (PRN):  acetaminophen     Tablet .. 650 milliGRAM(s) Oral every 6 hours PRN Temp greater or equal to 38C (100.4F), Mild Pain (1 - 3)  artificial tears (preservative free) Ophthalmic Solution 1 Drop(s) Both EYES every 6 hours PRN Dry Eyes  hydrALAZINE Injectable 10 milliGRAM(s) IV Push every 2 hours PRN SBP > 160 mmHg  labetalol Injectable 10 milliGRAM(s) IV Push every 2 hours PRN Systolic blood pressure > 160  ondansetron Injectable 4 milliGRAM(s) IV Push every 6 hours PRN Nausea and/or Vomiting        I&O's Summary    19 Dec 2023 07:01  -  20 Dec 2023 07:00  --------------------------------------------------------  IN: 0 mL / OUT: 1200 mL / NET: -1200 mL        PHYSICAL EXAM:  Vital Signs Last 24 Hrs  T(C): 37 (20 Dec 2023 08:00), Max: 38 (20 Dec 2023 00:24)  T(F): 98.6 (20 Dec 2023 08:00), Max: 100.4 (20 Dec 2023 00:24)  HR: 92 (20 Dec 2023 08:00) (88 - 118)  BP: 114/54 (20 Dec 2023 08:00) (80/50 - 148/96)  BP(mean): 67 (20 Dec 2023 08:00) (60 - 101)  RR: 20 (20 Dec 2023 08:00) (16 - 25)  SpO2: 98% (20 Dec 2023 08:00) (95% - 98%)    Parameters below as of 20 Dec 2023 08:00  Patient On (Oxygen Delivery Method): room air            CONSTITUTIONAL: NAD, lethargic  HEENT: Craniotomy incision appears clean  RESPIRATORY: CTA bilaterally, normal effort  CARDIOVASCULAR: RRR, S1/S2+, no m/g/r  ABDOMEN: Nontender to palpation, normoactive bowel sounds, no rebound/guarding  MUSCULOSKELETAL: No edema, cyanosis or deformities.  PSYCH: Calm, affect appropriate.  NEUROLOGY: Lethargic, RUE is 4/5 and RLE is 2-3/5, LUE/LLE withdrawal to pain  SKIN: No rashes; no palpable lesions  VASC: Distal pulses palpable    LABS:                        8.7    9.94  )-----------( 266      ( 20 Dec 2023 07:32 )             27.2     12-20    140  |  107  |  22.1<H>  ----------------------------<  116<H>  4.3   |  23.0  |  0.89    Ca    9.0      20 Dec 2023 07:32    TPro  5.9<L>  /  Alb  3.0<L>  /  TBili  <0.2<L>  /  DBili  x   /  AST  15  /  ALT  8   /  AlkPhos  140<H>  12-20          Urinalysis Basic - ( 20 Dec 2023 07:32 )    Color: x / Appearance: x / SG: x / pH: x  Gluc: 116 mg/dL / Ketone: x  / Bili: x / Urobili: x   Blood: x / Protein: x / Nitrite: x   Leuk Esterase: x / RBC: x / WBC x   Sq Epi: x / Non Sq Epi: x / Bacteria: x        CAPILLARY BLOOD GLUCOSE            RADIOLOGY & ADDITIONAL TESTS:  Results Reviewed:   Imaging Personally Reviewed:  Electrocardiogram Personally Reviewed:                                           [facility] Division of Hospital Medicine  Jc Cedillo MD    Chief Complaint:  Patient is a 83y old  Male who presents with a chief complaint of s/p unwitnessed fall with head strike (19 Dec 2023 11:59)      SUBJECTIVE / OVERNIGHT EVENTS:  Patient seen and examined at bedside. Overnight pt more lethargic, with BP of 80/50 and fever of 100.4. Fever work up ordered. Given 500cc NS bolus with improvement. CTH unchanged.     MEDICATIONS  (STANDING):  atorvastatin 80 milliGRAM(s) Oral at bedtime  chlorhexidine 2% Cloths 1 Application(s) Topical <User Schedule>  diltiazem    Tablet 60 milliGRAM(s) Oral <User Schedule>  doxazosin 2 milliGRAM(s) Oral at bedtime  folic acid 1 milliGRAM(s) Oral daily  fondaparinux Injectable 2.5 milliGRAM(s) SubCutaneous <User Schedule>  levETIRAcetam  IVPB 500 milliGRAM(s) IV Intermittent every 12 hours  melatonin 5 milliGRAM(s) Oral at bedtime  memantine 5 milliGRAM(s) Oral two times a day  metoprolol tartrate 50 milliGRAM(s) Oral <User Schedule>  modafinil 100 milliGRAM(s) Oral <User Schedule>  Nephro-roma 1 Tablet(s) Oral daily  polyethylene glycol 3350 17 Gram(s) Oral daily  senna 2 Tablet(s) Oral at bedtime    MEDICATIONS  (PRN):  acetaminophen     Tablet .. 650 milliGRAM(s) Oral every 6 hours PRN Temp greater or equal to 38C (100.4F), Mild Pain (1 - 3)  artificial tears (preservative free) Ophthalmic Solution 1 Drop(s) Both EYES every 6 hours PRN Dry Eyes  hydrALAZINE Injectable 10 milliGRAM(s) IV Push every 2 hours PRN SBP > 160 mmHg  labetalol Injectable 10 milliGRAM(s) IV Push every 2 hours PRN Systolic blood pressure > 160  ondansetron Injectable 4 milliGRAM(s) IV Push every 6 hours PRN Nausea and/or Vomiting        I&O's Summary    19 Dec 2023 07:01  -  20 Dec 2023 07:00  --------------------------------------------------------  IN: 0 mL / OUT: 1200 mL / NET: -1200 mL        PHYSICAL EXAM:  Vital Signs Last 24 Hrs  T(C): 37 (20 Dec 2023 08:00), Max: 38 (20 Dec 2023 00:24)  T(F): 98.6 (20 Dec 2023 08:00), Max: 100.4 (20 Dec 2023 00:24)  HR: 92 (20 Dec 2023 08:00) (88 - 118)  BP: 114/54 (20 Dec 2023 08:00) (80/50 - 148/96)  BP(mean): 67 (20 Dec 2023 08:00) (60 - 101)  RR: 20 (20 Dec 2023 08:00) (16 - 25)  SpO2: 98% (20 Dec 2023 08:00) (95% - 98%)    Parameters below as of 20 Dec 2023 08:00  Patient On (Oxygen Delivery Method): room air            CONSTITUTIONAL: NAD, lethargic  HEENT: Craniotomy incision appears clean  RESPIRATORY: CTA bilaterally, normal effort  CARDIOVASCULAR: RRR, S1/S2+, no m/g/r  ABDOMEN: Nontender to palpation, normoactive bowel sounds, no rebound/guarding  MUSCULOSKELETAL: No edema, cyanosis or deformities.  PSYCH: Calm, affect appropriate.  NEUROLOGY: Lethargic, RUE is 4/5 and RLE is 2-3/5, LUE/LLE withdrawal to pain  SKIN: No rashes; no palpable lesions  VASC: Distal pulses palpable    LABS:                        8.7    9.94  )-----------( 266      ( 20 Dec 2023 07:32 )             27.2     12-20    140  |  107  |  22.1<H>  ----------------------------<  116<H>  4.3   |  23.0  |  0.89    Ca    9.0      20 Dec 2023 07:32    TPro  5.9<L>  /  Alb  3.0<L>  /  TBili  <0.2<L>  /  DBili  x   /  AST  15  /  ALT  8   /  AlkPhos  140<H>  12-20          Urinalysis Basic - ( 20 Dec 2023 07:32 )    Color: x / Appearance: x / SG: x / pH: x  Gluc: 116 mg/dL / Ketone: x  / Bili: x / Urobili: x   Blood: x / Protein: x / Nitrite: x   Leuk Esterase: x / RBC: x / WBC x   Sq Epi: x / Non Sq Epi: x / Bacteria: x        CAPILLARY BLOOD GLUCOSE            RADIOLOGY & ADDITIONAL TESTS:  Results Reviewed:   Imaging Personally Reviewed:  Electrocardiogram Personally Reviewed:

## 2023-12-20 NOTE — PHYSICAL THERAPY INITIAL EVALUATION ADULT - PERTINENT HX OF CURRENT PROBLEM, REHAB EVAL
83M w/ PMH HTN, CAD s/p stents on ASA/Plavix, RCC s/p L nephrectomy, bladder CA, BPH, CHF, LBBB, vertigo, HLD, 5.5 cm AAA without rupture post EVAR, paroxysmal afib on Eliquis, early stage Alzheimer's dementia, presented to [facility] 11/10/23 s/p unwitnessed fall with head strike at home found by wife, found with multicompartmental ICH, s/p R craniectomy 11/10/23, course significant for Klebsiella pneumonia, hypernatremia, lethargy, LUE DVT, new anterior falcine SDH, downgraded to SDU 11/28  s/p R cranioplasty 83M w/ PMH HTN, CAD s/p stents on ASA/Plavix, RCC s/p L nephrectomy, bladder CA, BPH, CHF, LBBB, vertigo, HLD, 5.5 cm AAA without rupture post EVAR, paroxysmal afib on Eliquis, early stage Alzheimer's dementia, presented to [facility] 11/10/23 s/p unwitnessed fall with head strike at home found by wife, found with multicompartmental ICH, s/p R craniectomy 11/10/23, course significant for Klebsiella pneumonia, hypernatremia, lethargy, LUE DVT, new anterior falcine SDH, downgraded to SDU 11/28  s/p R cranioplasty

## 2023-12-20 NOTE — PROGRESS NOTE ADULT - ASSESSMENT
84 y/o male with PMH of HTN, CAD s/p PCO, RCC s/p left nephrectomy, bladder Ca, BPH, CHF, Vertigo, HLD, AAA s/p EVAR, parox Afib, h/o HIT, Alzheimer transferred s/p fall with Rt frontal IPH. Initially had hemicraniectomy course complicated by RVR, aspiration PNA, hypoxic respiratory failure, LUE DVT and questionable increase in AAA size. Started on AC with waxing/waning mental status prompting head imaging with evidence of new bleed. Cardiology following. Neurology consulted after MRI showed stroke. Echo done concerning for mobile echodensity on aortic valve and strokes in multiple arterial territories; WHIT recommended but as noted documented but family declined. Patient was downgraded to medical floor and later upgraded to neuro ICU after cranioplasty; subgaleal drain was removed 12/10. Patient found to have low grade fever, tachycardic, blood culture sent, UA noted for pyuria. Downgraded to medicine team 12/10. After discussion with interdisciplinary teams, Eliquis and ASA was restarted. CT head obtained on 12/15 showed new increase in right frontal IPH. Upgraded again to neuroICU. Was given andexxa for reversal. Repeat CT heads are now stable. Now deemed stable to downgrad again to medicine.     IPH  AMS   -s/p decompressive R hemicraniotomy 11/10 and s/p R cranioplasty with replacement of bone flap 12/7  -s/p subgaleal drain removed 12/10   -CTH 12/14 s/p R frontal prosthetic cranioplasty, adjacent to cranioplasty is extra-axial collection of fluid, air, hemorrhage, smaller in size; stable small R frontal subdural hypodense collection; improved small hemorrhages in posterior R frontal lobe; no new hemorrhage   - Was restarted on AC and then repeat CT head showed increase in right frontal IPH  - Patient was then again transferred to NSICU - Given andexxa  - Continue Keppra BID  - NSG following  - PT/OT plan for [facility]  - CTH 12/18 with increased size of ventricle. Repeat CTH stable.  - CTH repeated this morning stable  - Mentation waxing and waning    Fever  - CXR unremarkable  - Cultures pending  - Check RVP, procalcitonin  - Monitor WBC, fever curve.     PAfib, HTN, HLD, CAD   - Continue Metoprolol, diltiazem BID   - cont statin   - cont prn labetalol, hydralazine   - Holding ASA per NSG  - Recommend against restarting of AC given 2 separate bleeds. Outpatient follow up with Cardiology    Acute LUE DVT   - US LUE: stable partially occlusive L axillary vein thrombosis and L cephalic vein superficial thrombophlebitis; segmental occlusive thrombosis within 1 of 2 brachial veins AV fistula surrounded by thrombosis vs pseudoaneurysm   - AC held  - avoid heparin products 2/2 hx of HIT   - BLE SCDs - Started on Fondaparinux  - Discussed with IR - no indication for any filter such SVC.     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

## 2023-12-20 NOTE — PHYSICAL THERAPY INITIAL EVALUATION ADULT - LEVEL OF INDEPENDENCE: SIT/STAND, REHAB EVAL
TBA when more appropriate
Pt lethargic, keeping eyes closed during assessment unable to attempt at this time/unable to perform
unable to perform
maximum assist (25% patients effort)

## 2023-12-21 DIAGNOSIS — K92.0 HEMATEMESIS: ICD-10-CM

## 2023-12-21 LAB
ALBUMIN SERPL ELPH-MCNC: 2.6 G/DL — LOW (ref 3.3–5.2)
ALBUMIN SERPL ELPH-MCNC: 2.6 G/DL — LOW (ref 3.3–5.2)
ALBUMIN SERPL ELPH-MCNC: 2.9 G/DL — LOW (ref 3.3–5.2)
ALBUMIN SERPL ELPH-MCNC: 2.9 G/DL — LOW (ref 3.3–5.2)
ALBUMIN SERPL ELPH-MCNC: 3 G/DL — LOW (ref 3.3–5.2)
ALBUMIN SERPL ELPH-MCNC: 3 G/DL — LOW (ref 3.3–5.2)
ALP SERPL-CCNC: 110 U/L — SIGNIFICANT CHANGE UP (ref 40–120)
ALP SERPL-CCNC: 110 U/L — SIGNIFICANT CHANGE UP (ref 40–120)
ALP SERPL-CCNC: 128 U/L — HIGH (ref 40–120)
ALP SERPL-CCNC: 128 U/L — HIGH (ref 40–120)
ALP SERPL-CCNC: 138 U/L — HIGH (ref 40–120)
ALP SERPL-CCNC: 138 U/L — HIGH (ref 40–120)
ALT FLD-CCNC: 21 U/L — SIGNIFICANT CHANGE UP
ALT FLD-CCNC: 21 U/L — SIGNIFICANT CHANGE UP
ALT FLD-CCNC: 7 U/L — SIGNIFICANT CHANGE UP
ALT FLD-CCNC: 7 U/L — SIGNIFICANT CHANGE UP
ALT FLD-CCNC: 9 U/L — SIGNIFICANT CHANGE UP
ALT FLD-CCNC: 9 U/L — SIGNIFICANT CHANGE UP
AMORPH CRY # UR COMP ASSIST: PRESENT
AMORPH CRY # UR COMP ASSIST: PRESENT
ANION GAP SERPL CALC-SCNC: 14 MMOL/L — SIGNIFICANT CHANGE UP (ref 5–17)
ANION GAP SERPL CALC-SCNC: 14 MMOL/L — SIGNIFICANT CHANGE UP (ref 5–17)
ANION GAP SERPL CALC-SCNC: 16 MMOL/L — SIGNIFICANT CHANGE UP (ref 5–17)
ANION GAP SERPL CALC-SCNC: 16 MMOL/L — SIGNIFICANT CHANGE UP (ref 5–17)
ANION GAP SERPL CALC-SCNC: 17 MMOL/L — SIGNIFICANT CHANGE UP (ref 5–17)
ANION GAP SERPL CALC-SCNC: 17 MMOL/L — SIGNIFICANT CHANGE UP (ref 5–17)
APPEARANCE CSF: CLEAR — SIGNIFICANT CHANGE UP
APPEARANCE CSF: CLEAR — SIGNIFICANT CHANGE UP
APPEARANCE SPUN FLD: COLORLESS — SIGNIFICANT CHANGE UP
APPEARANCE SPUN FLD: COLORLESS — SIGNIFICANT CHANGE UP
APPEARANCE UR: ABNORMAL
APPEARANCE UR: ABNORMAL
APTT BLD: 27.2 SEC — SIGNIFICANT CHANGE UP (ref 24.5–35.6)
APTT BLD: 27.2 SEC — SIGNIFICANT CHANGE UP (ref 24.5–35.6)
APTT BLD: 28.2 SEC — SIGNIFICANT CHANGE UP (ref 24.5–35.6)
APTT BLD: 28.2 SEC — SIGNIFICANT CHANGE UP (ref 24.5–35.6)
AST SERPL-CCNC: 11 U/L — SIGNIFICANT CHANGE UP
AST SERPL-CCNC: 11 U/L — SIGNIFICANT CHANGE UP
AST SERPL-CCNC: 16 U/L — SIGNIFICANT CHANGE UP
AST SERPL-CCNC: 16 U/L — SIGNIFICANT CHANGE UP
AST SERPL-CCNC: 34 U/L — SIGNIFICANT CHANGE UP
AST SERPL-CCNC: 34 U/L — SIGNIFICANT CHANGE UP
BACTERIA # UR AUTO: NEGATIVE /HPF — SIGNIFICANT CHANGE UP
BACTERIA # UR AUTO: NEGATIVE /HPF — SIGNIFICANT CHANGE UP
BASE EXCESS BLDA CALC-SCNC: -6.7 MMOL/L — LOW (ref -2–3)
BASE EXCESS BLDA CALC-SCNC: -6.7 MMOL/L — LOW (ref -2–3)
BASE EXCESS BLDV CALC-SCNC: -5.7 MMOL/L — LOW (ref -2–3)
BASE EXCESS BLDV CALC-SCNC: -5.7 MMOL/L — LOW (ref -2–3)
BILIRUB SERPL-MCNC: 0.2 MG/DL — LOW (ref 0.4–2)
BILIRUB SERPL-MCNC: 0.2 MG/DL — LOW (ref 0.4–2)
BILIRUB SERPL-MCNC: 0.3 MG/DL — LOW (ref 0.4–2)
BILIRUB SERPL-MCNC: 0.3 MG/DL — LOW (ref 0.4–2)
BILIRUB SERPL-MCNC: 0.4 MG/DL — SIGNIFICANT CHANGE UP (ref 0.4–2)
BILIRUB SERPL-MCNC: 0.4 MG/DL — SIGNIFICANT CHANGE UP (ref 0.4–2)
BILIRUB UR-MCNC: NEGATIVE — SIGNIFICANT CHANGE UP
BILIRUB UR-MCNC: NEGATIVE — SIGNIFICANT CHANGE UP
BLOOD GAS COMMENTS ARTERIAL: SIGNIFICANT CHANGE UP
BLOOD GAS COMMENTS ARTERIAL: SIGNIFICANT CHANGE UP
BLOOD GAS COMMENTS, VENOUS: SIGNIFICANT CHANGE UP
BLOOD GAS COMMENTS, VENOUS: SIGNIFICANT CHANGE UP
BUN SERPL-MCNC: 26.2 MG/DL — HIGH (ref 8–20)
BUN SERPL-MCNC: 26.2 MG/DL — HIGH (ref 8–20)
BUN SERPL-MCNC: 34.1 MG/DL — HIGH (ref 8–20)
BUN SERPL-MCNC: 34.1 MG/DL — HIGH (ref 8–20)
BUN SERPL-MCNC: 36.6 MG/DL — HIGH (ref 8–20)
BUN SERPL-MCNC: 36.6 MG/DL — HIGH (ref 8–20)
CA-I SERPL-SCNC: 1.35 MMOL/L — HIGH (ref 1.15–1.33)
CA-I SERPL-SCNC: 1.35 MMOL/L — HIGH (ref 1.15–1.33)
CALCIUM SERPL-MCNC: 8.8 MG/DL — SIGNIFICANT CHANGE UP (ref 8.4–10.5)
CALCIUM SERPL-MCNC: 9.2 MG/DL — SIGNIFICANT CHANGE UP (ref 8.4–10.5)
CALCIUM SERPL-MCNC: 9.2 MG/DL — SIGNIFICANT CHANGE UP (ref 8.4–10.5)
CAST: 11 /LPF — HIGH (ref 0–4)
CAST: 11 /LPF — HIGH (ref 0–4)
CHLORIDE BLDV-SCNC: 104 MMOL/L — SIGNIFICANT CHANGE UP (ref 96–108)
CHLORIDE BLDV-SCNC: 104 MMOL/L — SIGNIFICANT CHANGE UP (ref 96–108)
CHLORIDE SERPL-SCNC: 101 MMOL/L — SIGNIFICANT CHANGE UP (ref 96–108)
CHLORIDE SERPL-SCNC: 101 MMOL/L — SIGNIFICANT CHANGE UP (ref 96–108)
CHLORIDE SERPL-SCNC: 103 MMOL/L — SIGNIFICANT CHANGE UP (ref 96–108)
CHLORIDE SERPL-SCNC: 103 MMOL/L — SIGNIFICANT CHANGE UP (ref 96–108)
CHLORIDE SERPL-SCNC: 105 MMOL/L — SIGNIFICANT CHANGE UP (ref 96–108)
CHLORIDE SERPL-SCNC: 105 MMOL/L — SIGNIFICANT CHANGE UP (ref 96–108)
CO2 SERPL-SCNC: 18 MMOL/L — LOW (ref 22–29)
CO2 SERPL-SCNC: 18 MMOL/L — LOW (ref 22–29)
CO2 SERPL-SCNC: 19 MMOL/L — LOW (ref 22–29)
CO2 SERPL-SCNC: 19 MMOL/L — LOW (ref 22–29)
CO2 SERPL-SCNC: 21 MMOL/L — LOW (ref 22–29)
CO2 SERPL-SCNC: 21 MMOL/L — LOW (ref 22–29)
COD CRY URNS QL: PRESENT
COD CRY URNS QL: PRESENT
COLOR CSF: ABNORMAL
COLOR CSF: ABNORMAL
COLOR SPEC: YELLOW — SIGNIFICANT CHANGE UP
COLOR SPEC: YELLOW — SIGNIFICANT CHANGE UP
CREAT SERPL-MCNC: 1.09 MG/DL — SIGNIFICANT CHANGE UP (ref 0.5–1.3)
CREAT SERPL-MCNC: 1.09 MG/DL — SIGNIFICANT CHANGE UP (ref 0.5–1.3)
CREAT SERPL-MCNC: 1.68 MG/DL — HIGH (ref 0.5–1.3)
CREAT SERPL-MCNC: 1.68 MG/DL — HIGH (ref 0.5–1.3)
CREAT SERPL-MCNC: 1.92 MG/DL — HIGH (ref 0.5–1.3)
CREAT SERPL-MCNC: 1.92 MG/DL — HIGH (ref 0.5–1.3)
CRYPTOC AG CSF-ACNC: NEGATIVE — SIGNIFICANT CHANGE UP
CRYPTOC AG CSF-ACNC: NEGATIVE — SIGNIFICANT CHANGE UP
DIFF PNL FLD: ABNORMAL
DIFF PNL FLD: ABNORMAL
EGFR: 34 ML/MIN/1.73M2 — LOW
EGFR: 34 ML/MIN/1.73M2 — LOW
EGFR: 40 ML/MIN/1.73M2 — LOW
EGFR: 40 ML/MIN/1.73M2 — LOW
EGFR: 68 ML/MIN/1.73M2 — SIGNIFICANT CHANGE UP
EGFR: 68 ML/MIN/1.73M2 — SIGNIFICANT CHANGE UP
GAS PNL BLDA: SIGNIFICANT CHANGE UP
GAS PNL BLDV: 137 MMOL/L — SIGNIFICANT CHANGE UP (ref 136–145)
GAS PNL BLDV: 137 MMOL/L — SIGNIFICANT CHANGE UP (ref 136–145)
GAS PNL BLDV: SIGNIFICANT CHANGE UP
GAS PNL BLDV: SIGNIFICANT CHANGE UP
GLUCOSE BLDC GLUCOMTR-MCNC: 329 MG/DL — HIGH (ref 70–99)
GLUCOSE BLDC GLUCOMTR-MCNC: 329 MG/DL — HIGH (ref 70–99)
GLUCOSE BLDV-MCNC: 164 MG/DL — HIGH (ref 70–99)
GLUCOSE BLDV-MCNC: 164 MG/DL — HIGH (ref 70–99)
GLUCOSE CSF-MCNC: 106 MG/DLG/24H — HIGH (ref 40–70)
GLUCOSE CSF-MCNC: 106 MG/DLG/24H — HIGH (ref 40–70)
GLUCOSE SERPL-MCNC: 165 MG/DL — HIGH (ref 70–99)
GLUCOSE SERPL-MCNC: 203 MG/DL — HIGH (ref 70–99)
GLUCOSE SERPL-MCNC: 203 MG/DL — HIGH (ref 70–99)
GLUCOSE UR QL: NEGATIVE MG/DL — SIGNIFICANT CHANGE UP
GLUCOSE UR QL: NEGATIVE MG/DL — SIGNIFICANT CHANGE UP
GRAM STN FLD: ABNORMAL
GRAM STN FLD: ABNORMAL
GRAM STN FLD: SIGNIFICANT CHANGE UP
GRAM STN FLD: SIGNIFICANT CHANGE UP
HCO3 BLDA-SCNC: 20 MMOL/L — LOW (ref 21–28)
HCO3 BLDA-SCNC: 20 MMOL/L — LOW (ref 21–28)
HCO3 BLDV-SCNC: 22 MMOL/L — SIGNIFICANT CHANGE UP (ref 22–29)
HCO3 BLDV-SCNC: 22 MMOL/L — SIGNIFICANT CHANGE UP (ref 22–29)
HCT VFR BLD CALC: 26 % — LOW (ref 39–50)
HCT VFR BLD CALC: 26 % — LOW (ref 39–50)
HCT VFR BLD CALC: 29 % — LOW (ref 39–50)
HCT VFR BLD CALC: 29 % — LOW (ref 39–50)
HCT VFR BLD CALC: 29.5 % — LOW (ref 39–50)
HCT VFR BLD CALC: 29.5 % — LOW (ref 39–50)
HCT VFR BLDA CALC: 31 % — SIGNIFICANT CHANGE UP
HCT VFR BLDA CALC: 31 % — SIGNIFICANT CHANGE UP
HGB BLD CALC-MCNC: 10.4 G/DL — LOW (ref 12.6–17.4)
HGB BLD CALC-MCNC: 10.4 G/DL — LOW (ref 12.6–17.4)
HGB BLD-MCNC: 8.7 G/DL — LOW (ref 13–17)
HGB BLD-MCNC: 8.7 G/DL — LOW (ref 13–17)
HGB BLD-MCNC: 9.5 G/DL — LOW (ref 13–17)
HGB BLD-MCNC: 9.5 G/DL — LOW (ref 13–17)
HGB BLD-MCNC: 9.8 G/DL — LOW (ref 13–17)
HGB BLD-MCNC: 9.8 G/DL — LOW (ref 13–17)
HOROWITZ INDEX BLDA+IHG-RTO: 100 — SIGNIFICANT CHANGE UP
HOROWITZ INDEX BLDA+IHG-RTO: 100 — SIGNIFICANT CHANGE UP
HOROWITZ INDEX BLDV+IHG-RTO: 100 — SIGNIFICANT CHANGE UP
HOROWITZ INDEX BLDV+IHG-RTO: 100 — SIGNIFICANT CHANGE UP
INR BLD: 1.19 RATIO — HIGH (ref 0.85–1.18)
INR BLD: 1.19 RATIO — HIGH (ref 0.85–1.18)
INR BLD: 1.25 RATIO — HIGH (ref 0.85–1.18)
INR BLD: 1.25 RATIO — HIGH (ref 0.85–1.18)
KETONES UR-MCNC: ABNORMAL MG/DL
KETONES UR-MCNC: ABNORMAL MG/DL
LACTATE BLDV-MCNC: 3 MMOL/L — HIGH (ref 0.5–2)
LACTATE BLDV-MCNC: 3 MMOL/L — HIGH (ref 0.5–2)
LACTATE SERPL-SCNC: 2.2 MMOL/L — HIGH (ref 0.5–2)
LACTATE SERPL-SCNC: 2.2 MMOL/L — HIGH (ref 0.5–2)
LEUKOCYTE ESTERASE UR-ACNC: ABNORMAL
LEUKOCYTE ESTERASE UR-ACNC: ABNORMAL
MAGNESIUM SERPL-MCNC: 1.8 MG/DL — SIGNIFICANT CHANGE UP (ref 1.6–2.6)
MAGNESIUM SERPL-MCNC: 1.8 MG/DL — SIGNIFICANT CHANGE UP (ref 1.6–2.6)
MAGNESIUM SERPL-MCNC: 1.9 MG/DL — SIGNIFICANT CHANGE UP (ref 1.6–2.6)
MAGNESIUM SERPL-MCNC: 1.9 MG/DL — SIGNIFICANT CHANGE UP (ref 1.6–2.6)
MCHC RBC-ENTMCNC: 31.9 PG — SIGNIFICANT CHANGE UP (ref 27–34)
MCHC RBC-ENTMCNC: 31.9 PG — SIGNIFICANT CHANGE UP (ref 27–34)
MCHC RBC-ENTMCNC: 32.3 PG — SIGNIFICANT CHANGE UP (ref 27–34)
MCHC RBC-ENTMCNC: 32.3 PG — SIGNIFICANT CHANGE UP (ref 27–34)
MCHC RBC-ENTMCNC: 32.8 GM/DL — SIGNIFICANT CHANGE UP (ref 32–36)
MCHC RBC-ENTMCNC: 32.8 GM/DL — SIGNIFICANT CHANGE UP (ref 32–36)
MCHC RBC-ENTMCNC: 32.8 PG — SIGNIFICANT CHANGE UP (ref 27–34)
MCHC RBC-ENTMCNC: 32.8 PG — SIGNIFICANT CHANGE UP (ref 27–34)
MCHC RBC-ENTMCNC: 33.2 GM/DL — SIGNIFICANT CHANGE UP (ref 32–36)
MCHC RBC-ENTMCNC: 33.2 GM/DL — SIGNIFICANT CHANGE UP (ref 32–36)
MCHC RBC-ENTMCNC: 33.5 GM/DL — SIGNIFICANT CHANGE UP (ref 32–36)
MCHC RBC-ENTMCNC: 33.5 GM/DL — SIGNIFICANT CHANGE UP (ref 32–36)
MCV RBC AUTO: 97.3 FL — SIGNIFICANT CHANGE UP (ref 80–100)
MCV RBC AUTO: 97.3 FL — SIGNIFICANT CHANGE UP (ref 80–100)
MCV RBC AUTO: 97.4 FL — SIGNIFICANT CHANGE UP (ref 80–100)
MCV RBC AUTO: 97.4 FL — SIGNIFICANT CHANGE UP (ref 80–100)
MCV RBC AUTO: 98.1 FL — SIGNIFICANT CHANGE UP (ref 80–100)
MCV RBC AUTO: 98.1 FL — SIGNIFICANT CHANGE UP (ref 80–100)
NEUTROPHILS # CSF: SIGNIFICANT CHANGE UP % (ref 0–6)
NEUTROPHILS # CSF: SIGNIFICANT CHANGE UP % (ref 0–6)
NITRITE UR-MCNC: NEGATIVE — SIGNIFICANT CHANGE UP
NITRITE UR-MCNC: NEGATIVE — SIGNIFICANT CHANGE UP
NRBC NFR CSF: 5 /UL — SIGNIFICANT CHANGE UP (ref 0–5)
NRBC NFR CSF: 5 /UL — SIGNIFICANT CHANGE UP (ref 0–5)
PCO2 BLDA: 40 MMHG — SIGNIFICANT CHANGE UP (ref 35–48)
PCO2 BLDA: 40 MMHG — SIGNIFICANT CHANGE UP (ref 35–48)
PCO2 BLDV: 57 MMHG — HIGH (ref 42–55)
PCO2 BLDV: 57 MMHG — HIGH (ref 42–55)
PH BLDA: 7.3 — LOW (ref 7.35–7.45)
PH BLDA: 7.3 — LOW (ref 7.35–7.45)
PH BLDV: 7.2 — CRITICAL LOW (ref 7.32–7.43)
PH BLDV: 7.2 — CRITICAL LOW (ref 7.32–7.43)
PH UR: 5.5 — SIGNIFICANT CHANGE UP (ref 5–8)
PH UR: 5.5 — SIGNIFICANT CHANGE UP (ref 5–8)
PHOSPHATE SERPL-MCNC: 5.6 MG/DL — HIGH (ref 2.4–4.7)
PHOSPHATE SERPL-MCNC: 5.6 MG/DL — HIGH (ref 2.4–4.7)
PHOSPHATE SERPL-MCNC: 5.8 MG/DL — HIGH (ref 2.4–4.7)
PHOSPHATE SERPL-MCNC: 5.8 MG/DL — HIGH (ref 2.4–4.7)
PLATELET # BLD AUTO: 290 K/UL — SIGNIFICANT CHANGE UP (ref 150–400)
PLATELET # BLD AUTO: 290 K/UL — SIGNIFICANT CHANGE UP (ref 150–400)
PLATELET # BLD AUTO: 338 K/UL — SIGNIFICANT CHANGE UP (ref 150–400)
PLATELET # BLD AUTO: 338 K/UL — SIGNIFICANT CHANGE UP (ref 150–400)
PLATELET # BLD AUTO: 350 K/UL — SIGNIFICANT CHANGE UP (ref 150–400)
PLATELET # BLD AUTO: 350 K/UL — SIGNIFICANT CHANGE UP (ref 150–400)
PO2 BLDA: 157 MMHG — HIGH (ref 83–108)
PO2 BLDA: 157 MMHG — HIGH (ref 83–108)
PO2 BLDV: <42 MMHG — SIGNIFICANT CHANGE UP (ref 25–45)
PO2 BLDV: <42 MMHG — SIGNIFICANT CHANGE UP (ref 25–45)
POTASSIUM BLDV-SCNC: 4.3 MMOL/L — SIGNIFICANT CHANGE UP (ref 3.5–5.1)
POTASSIUM BLDV-SCNC: 4.3 MMOL/L — SIGNIFICANT CHANGE UP (ref 3.5–5.1)
POTASSIUM SERPL-MCNC: 3.7 MMOL/L — SIGNIFICANT CHANGE UP (ref 3.5–5.3)
POTASSIUM SERPL-MCNC: 3.7 MMOL/L — SIGNIFICANT CHANGE UP (ref 3.5–5.3)
POTASSIUM SERPL-MCNC: 4.2 MMOL/L — SIGNIFICANT CHANGE UP (ref 3.5–5.3)
POTASSIUM SERPL-MCNC: 4.2 MMOL/L — SIGNIFICANT CHANGE UP (ref 3.5–5.3)
POTASSIUM SERPL-MCNC: 4.9 MMOL/L — SIGNIFICANT CHANGE UP (ref 3.5–5.3)
POTASSIUM SERPL-MCNC: 4.9 MMOL/L — SIGNIFICANT CHANGE UP (ref 3.5–5.3)
POTASSIUM SERPL-SCNC: 3.7 MMOL/L — SIGNIFICANT CHANGE UP (ref 3.5–5.3)
POTASSIUM SERPL-SCNC: 3.7 MMOL/L — SIGNIFICANT CHANGE UP (ref 3.5–5.3)
POTASSIUM SERPL-SCNC: 4.2 MMOL/L — SIGNIFICANT CHANGE UP (ref 3.5–5.3)
POTASSIUM SERPL-SCNC: 4.2 MMOL/L — SIGNIFICANT CHANGE UP (ref 3.5–5.3)
POTASSIUM SERPL-SCNC: 4.9 MMOL/L — SIGNIFICANT CHANGE UP (ref 3.5–5.3)
POTASSIUM SERPL-SCNC: 4.9 MMOL/L — SIGNIFICANT CHANGE UP (ref 3.5–5.3)
PROT CSF-MCNC: 32 MG/DL — SIGNIFICANT CHANGE UP (ref 15–45)
PROT CSF-MCNC: 32 MG/DL — SIGNIFICANT CHANGE UP (ref 15–45)
PROT SERPL-MCNC: 5.4 G/DL — LOW (ref 6.6–8.7)
PROT SERPL-MCNC: 5.4 G/DL — LOW (ref 6.6–8.7)
PROT SERPL-MCNC: 6 G/DL — LOW (ref 6.6–8.7)
PROT SERPL-MCNC: 6 G/DL — LOW (ref 6.6–8.7)
PROT SERPL-MCNC: 6.3 G/DL — LOW (ref 6.6–8.7)
PROT SERPL-MCNC: 6.3 G/DL — LOW (ref 6.6–8.7)
PROT UR-MCNC: 100 MG/DL
PROT UR-MCNC: 100 MG/DL
PROTHROM AB SERPL-ACNC: 13.1 SEC — HIGH (ref 9.5–13)
PROTHROM AB SERPL-ACNC: 13.1 SEC — HIGH (ref 9.5–13)
PROTHROM AB SERPL-ACNC: 13.8 SEC — HIGH (ref 9.5–13)
PROTHROM AB SERPL-ACNC: 13.8 SEC — HIGH (ref 9.5–13)
RBC # BLD: 2.65 M/UL — LOW (ref 4.2–5.8)
RBC # BLD: 2.65 M/UL — LOW (ref 4.2–5.8)
RBC # BLD: 2.98 M/UL — LOW (ref 4.2–5.8)
RBC # BLD: 2.98 M/UL — LOW (ref 4.2–5.8)
RBC # BLD: 3.03 M/UL — LOW (ref 4.2–5.8)
RBC # BLD: 3.03 M/UL — LOW (ref 4.2–5.8)
RBC # CSF: 1120 /CMM — HIGH (ref 0–1)
RBC # CSF: 1120 /CMM — HIGH (ref 0–1)
RBC # FLD: 14.7 % — HIGH (ref 10.3–14.5)
RBC # FLD: 14.7 % — HIGH (ref 10.3–14.5)
RBC # FLD: 14.8 % — HIGH (ref 10.3–14.5)
RBC CASTS # UR COMP ASSIST: 73 /HPF — HIGH (ref 0–4)
RBC CASTS # UR COMP ASSIST: 73 /HPF — HIGH (ref 0–4)
SAO2 % BLDA: 100 % — HIGH (ref 94–98)
SAO2 % BLDA: 100 % — HIGH (ref 94–98)
SAO2 % BLDV: 41.9 % — SIGNIFICANT CHANGE UP
SAO2 % BLDV: 41.9 % — SIGNIFICANT CHANGE UP
SODIUM SERPL-SCNC: 134 MMOL/L — LOW (ref 135–145)
SODIUM SERPL-SCNC: 134 MMOL/L — LOW (ref 135–145)
SODIUM SERPL-SCNC: 140 MMOL/L — SIGNIFICANT CHANGE UP (ref 135–145)
SP GR SPEC: 1.02 — SIGNIFICANT CHANGE UP (ref 1–1.03)
SP GR SPEC: 1.02 — SIGNIFICANT CHANGE UP (ref 1–1.03)
SPECIMEN SOURCE: SIGNIFICANT CHANGE UP
SPECIMEN SOURCE: SIGNIFICANT CHANGE UP
SQUAMOUS # UR AUTO: 0 /HPF — SIGNIFICANT CHANGE UP (ref 0–5)
SQUAMOUS # UR AUTO: 0 /HPF — SIGNIFICANT CHANGE UP (ref 0–5)
TROPONIN T, HIGH SENSITIVITY RESULT: 123 NG/L — HIGH (ref 0–51)
TROPONIN T, HIGH SENSITIVITY RESULT: 123 NG/L — HIGH (ref 0–51)
TUBE TYPE: SIGNIFICANT CHANGE UP
TUBE TYPE: SIGNIFICANT CHANGE UP
UROBILINOGEN FLD QL: 0.2 MG/DL — SIGNIFICANT CHANGE UP (ref 0.2–1)
UROBILINOGEN FLD QL: 0.2 MG/DL — SIGNIFICANT CHANGE UP (ref 0.2–1)
WBC # BLD: 12.1 K/UL — HIGH (ref 3.8–10.5)
WBC # BLD: 12.1 K/UL — HIGH (ref 3.8–10.5)
WBC # BLD: 3.58 K/UL — LOW (ref 3.8–10.5)
WBC # BLD: 3.58 K/UL — LOW (ref 3.8–10.5)
WBC # BLD: 6.5 K/UL — SIGNIFICANT CHANGE UP (ref 3.8–10.5)
WBC # BLD: 6.5 K/UL — SIGNIFICANT CHANGE UP (ref 3.8–10.5)
WBC # FLD AUTO: 12.1 K/UL — HIGH (ref 3.8–10.5)
WBC # FLD AUTO: 12.1 K/UL — HIGH (ref 3.8–10.5)
WBC # FLD AUTO: 3.58 K/UL — LOW (ref 3.8–10.5)
WBC # FLD AUTO: 3.58 K/UL — LOW (ref 3.8–10.5)
WBC # FLD AUTO: 6.5 K/UL — SIGNIFICANT CHANGE UP (ref 3.8–10.5)
WBC # FLD AUTO: 6.5 K/UL — SIGNIFICANT CHANGE UP (ref 3.8–10.5)
WBC UR QL: 13 /HPF — HIGH (ref 0–5)
WBC UR QL: 13 /HPF — HIGH (ref 0–5)
YEAST-LIKE CELLS: PRESENT
YEAST-LIKE CELLS: PRESENT

## 2023-12-21 PROCEDURE — 99222 1ST HOSP IP/OBS MODERATE 55: CPT

## 2023-12-21 PROCEDURE — 71045 X-RAY EXAM CHEST 1 VIEW: CPT | Mod: 26,77

## 2023-12-21 PROCEDURE — 99222 1ST HOSP IP/OBS MODERATE 55: CPT | Mod: GC

## 2023-12-21 PROCEDURE — 74018 RADEX ABDOMEN 1 VIEW: CPT | Mod: 26

## 2023-12-21 PROCEDURE — 71045 X-RAY EXAM CHEST 1 VIEW: CPT | Mod: 26

## 2023-12-21 PROCEDURE — 99291 CRITICAL CARE FIRST HOUR: CPT

## 2023-12-21 PROCEDURE — 74174 CTA ABD&PLVS W/CONTRAST: CPT | Mod: 26

## 2023-12-21 PROCEDURE — 93306 TTE W/DOPPLER COMPLETE: CPT | Mod: 26

## 2023-12-21 PROCEDURE — 71260 CT THORAX DX C+: CPT | Mod: 26

## 2023-12-21 PROCEDURE — 99233 SBSQ HOSP IP/OBS HIGH 50: CPT

## 2023-12-21 PROCEDURE — 70450 CT HEAD/BRAIN W/O DYE: CPT | Mod: 26

## 2023-12-21 RX ORDER — ACETAMINOPHEN 500 MG
650 TABLET ORAL ONCE
Refills: 0 | Status: COMPLETED | OUTPATIENT
Start: 2023-12-21 | End: 2023-12-21

## 2023-12-21 RX ORDER — ACETAMINOPHEN 500 MG
1000 TABLET ORAL ONCE
Refills: 0 | Status: COMPLETED | OUTPATIENT
Start: 2023-12-21 | End: 2023-12-21

## 2023-12-21 RX ORDER — VANCOMYCIN HCL 1 G
750 VIAL (EA) INTRAVENOUS EVERY 12 HOURS
Refills: 0 | Status: DISCONTINUED | OUTPATIENT
Start: 2023-12-21 | End: 2023-12-21

## 2023-12-21 RX ORDER — SODIUM CHLORIDE 9 MG/ML
1000 INJECTION, SOLUTION INTRAVENOUS ONCE
Refills: 0 | Status: COMPLETED | OUTPATIENT
Start: 2023-12-21 | End: 2023-12-21

## 2023-12-21 RX ORDER — MEROPENEM 1 G/30ML
1000 INJECTION INTRAVENOUS EVERY 8 HOURS
Refills: 0 | Status: DISCONTINUED | OUTPATIENT
Start: 2023-12-21 | End: 2023-12-21

## 2023-12-21 RX ORDER — DEXMEDETOMIDINE HYDROCHLORIDE IN 0.9% SODIUM CHLORIDE 4 UG/ML
0.5 INJECTION INTRAVENOUS
Qty: 200 | Refills: 0 | Status: DISCONTINUED | OUTPATIENT
Start: 2023-12-21 | End: 2023-12-21

## 2023-12-21 RX ORDER — FENTANYL CITRATE 50 UG/ML
0.5 INJECTION INTRAVENOUS
Qty: 2500 | Refills: 0 | Status: DISCONTINUED | OUTPATIENT
Start: 2023-12-21 | End: 2023-12-22

## 2023-12-21 RX ORDER — DEXMEDETOMIDINE HYDROCHLORIDE IN 0.9% SODIUM CHLORIDE 4 UG/ML
0.5 INJECTION INTRAVENOUS
Qty: 200 | Refills: 0 | Status: DISCONTINUED | OUTPATIENT
Start: 2023-12-21 | End: 2023-12-22

## 2023-12-21 RX ORDER — NOREPINEPHRINE BITARTRATE/D5W 8 MG/250ML
0.05 PLASTIC BAG, INJECTION (ML) INTRAVENOUS
Qty: 8 | Refills: 0 | Status: DISCONTINUED | OUTPATIENT
Start: 2023-12-21 | End: 2023-12-21

## 2023-12-21 RX ORDER — FENTANYL CITRATE 50 UG/ML
100 INJECTION INTRAVENOUS ONCE
Refills: 0 | Status: DISCONTINUED | OUTPATIENT
Start: 2023-12-21 | End: 2023-12-21

## 2023-12-21 RX ORDER — VASOPRESSIN 20 [USP'U]/ML
0.04 INJECTION INTRAVENOUS
Qty: 40 | Refills: 0 | Status: DISCONTINUED | OUTPATIENT
Start: 2023-12-21 | End: 2023-12-24

## 2023-12-21 RX ORDER — PIPERACILLIN AND TAZOBACTAM 4; .5 G/20ML; G/20ML
3.38 INJECTION, POWDER, LYOPHILIZED, FOR SOLUTION INTRAVENOUS ONCE
Refills: 0 | Status: DISCONTINUED | OUTPATIENT
Start: 2023-12-21 | End: 2023-12-21

## 2023-12-21 RX ORDER — PANTOPRAZOLE SODIUM 20 MG/1
8 TABLET, DELAYED RELEASE ORAL
Qty: 80 | Refills: 0 | Status: DISCONTINUED | OUTPATIENT
Start: 2023-12-21 | End: 2023-12-22

## 2023-12-21 RX ORDER — VANCOMYCIN HCL 1 G
VIAL (EA) INTRAVENOUS
Refills: 0 | Status: DISCONTINUED | OUTPATIENT
Start: 2023-12-21 | End: 2023-12-21

## 2023-12-21 RX ORDER — PROPOFOL 10 MG/ML
10 INJECTION, EMULSION INTRAVENOUS
Qty: 1000 | Refills: 0 | Status: DISCONTINUED | OUTPATIENT
Start: 2023-12-21 | End: 2023-12-25

## 2023-12-21 RX ORDER — NOREPINEPHRINE BITARTRATE/D5W 8 MG/250ML
0.7 PLASTIC BAG, INJECTION (ML) INTRAVENOUS
Qty: 16 | Refills: 0 | Status: DISCONTINUED | OUTPATIENT
Start: 2023-12-21 | End: 2023-12-23

## 2023-12-21 RX ORDER — VANCOMYCIN HCL 1 G
1000 VIAL (EA) INTRAVENOUS ONCE
Refills: 0 | Status: COMPLETED | OUTPATIENT
Start: 2023-12-21 | End: 2023-12-21

## 2023-12-21 RX ORDER — HYDROCORTISONE 20 MG
50 TABLET ORAL EVERY 6 HOURS
Refills: 0 | Status: DISCONTINUED | OUTPATIENT
Start: 2023-12-21 | End: 2023-12-21

## 2023-12-21 RX ORDER — MEROPENEM 1 G/30ML
1000 INJECTION INTRAVENOUS EVERY 8 HOURS
Refills: 0 | Status: DISCONTINUED | OUTPATIENT
Start: 2023-12-21 | End: 2023-12-22

## 2023-12-21 RX ORDER — SODIUM CHLORIDE 9 MG/ML
1000 INJECTION, SOLUTION INTRAVENOUS
Refills: 0 | Status: DISCONTINUED | OUTPATIENT
Start: 2023-12-21 | End: 2023-12-22

## 2023-12-21 RX ORDER — PANTOPRAZOLE SODIUM 20 MG/1
80 TABLET, DELAYED RELEASE ORAL ONCE
Refills: 0 | Status: COMPLETED | OUTPATIENT
Start: 2023-12-21 | End: 2023-12-21

## 2023-12-21 RX ORDER — FENTANYL CITRATE 50 UG/ML
0.5 INJECTION INTRAVENOUS
Qty: 2500 | Refills: 0 | Status: DISCONTINUED | OUTPATIENT
Start: 2023-12-21 | End: 2023-12-21

## 2023-12-21 RX ORDER — MIDAZOLAM HYDROCHLORIDE 1 MG/ML
4 INJECTION, SOLUTION INTRAMUSCULAR; INTRAVENOUS ONCE
Refills: 0 | Status: DISCONTINUED | OUTPATIENT
Start: 2023-12-21 | End: 2023-12-21

## 2023-12-21 RX ORDER — MINERAL OIL
133 OIL (ML) MISCELLANEOUS ONCE
Refills: 0 | Status: COMPLETED | OUTPATIENT
Start: 2023-12-21 | End: 2023-12-21

## 2023-12-21 RX ORDER — VANCOMYCIN HCL 1 G
1000 VIAL (EA) INTRAVENOUS
Refills: 0 | Status: DISCONTINUED | OUTPATIENT
Start: 2023-12-22 | End: 2023-12-23

## 2023-12-21 RX ADMIN — Medication 1000 MILLIGRAM(S): at 08:00

## 2023-12-21 RX ADMIN — PANTOPRAZOLE SODIUM 80 MILLIGRAM(S): 20 TABLET, DELAYED RELEASE ORAL at 12:42

## 2023-12-21 RX ADMIN — DEXMEDETOMIDINE HYDROCHLORIDE IN 0.9% SODIUM CHLORIDE 9.1 MICROGRAM(S)/KG/HR: 4 INJECTION INTRAVENOUS at 16:00

## 2023-12-21 RX ADMIN — SENNA PLUS 2 TABLET(S): 8.6 TABLET ORAL at 22:15

## 2023-12-21 RX ADMIN — ATORVASTATIN CALCIUM 80 MILLIGRAM(S): 80 TABLET, FILM COATED ORAL at 22:15

## 2023-12-21 RX ADMIN — Medication 50 MILLIGRAM(S): at 01:57

## 2023-12-21 RX ADMIN — Medication 400 MILLIGRAM(S): at 07:35

## 2023-12-21 RX ADMIN — Medication 50 MILLIGRAM(S): at 12:51

## 2023-12-21 RX ADMIN — CHLORHEXIDINE GLUCONATE 1 APPLICATION(S): 213 SOLUTION TOPICAL at 06:07

## 2023-12-21 RX ADMIN — FENTANYL CITRATE 3.64 MICROGRAM(S)/KG/HR: 50 INJECTION INTRAVENOUS at 12:42

## 2023-12-21 RX ADMIN — LEVETIRACETAM 400 MILLIGRAM(S): 250 TABLET, FILM COATED ORAL at 18:04

## 2023-12-21 RX ADMIN — PANTOPRAZOLE SODIUM 10 MG/HR: 20 TABLET, DELAYED RELEASE ORAL at 09:48

## 2023-12-21 RX ADMIN — Medication 1000 MILLIGRAM(S): at 16:12

## 2023-12-21 RX ADMIN — PROPOFOL 4.37 MICROGRAM(S)/KG/MIN: 10 INJECTION, EMULSION INTRAVENOUS at 12:50

## 2023-12-21 RX ADMIN — FENTANYL CITRATE 100 MICROGRAM(S): 50 INJECTION INTRAVENOUS at 18:46

## 2023-12-21 RX ADMIN — FENTANYL CITRATE 100 MICROGRAM(S): 50 INJECTION INTRAVENOUS at 19:01

## 2023-12-21 RX ADMIN — VASOPRESSIN 6 UNIT(S)/MIN: 20 INJECTION INTRAVENOUS at 12:40

## 2023-12-21 RX ADMIN — ONDANSETRON 4 MILLIGRAM(S): 8 TABLET, FILM COATED ORAL at 06:00

## 2023-12-21 RX ADMIN — Medication 47.7 MICROGRAM(S)/KG/MIN: at 12:41

## 2023-12-21 RX ADMIN — SODIUM CHLORIDE 1000 MILLILITER(S): 9 INJECTION, SOLUTION INTRAVENOUS at 12:40

## 2023-12-21 RX ADMIN — Medication 133 MILLILITER(S): at 20:38

## 2023-12-21 RX ADMIN — Medication 400 MILLIGRAM(S): at 15:59

## 2023-12-21 RX ADMIN — Medication 250 MILLIGRAM(S): at 09:48

## 2023-12-21 RX ADMIN — MEROPENEM 1000 MILLIGRAM(S): 1 INJECTION INTRAVENOUS at 22:16

## 2023-12-21 RX ADMIN — SODIUM CHLORIDE 1000 MILLILITER(S): 9 INJECTION, SOLUTION INTRAVENOUS at 18:04

## 2023-12-21 RX ADMIN — Medication 2 MILLIGRAM(S): at 22:15

## 2023-12-21 RX ADMIN — PANTOPRAZOLE SODIUM 10 MG/HR: 20 TABLET, DELAYED RELEASE ORAL at 20:39

## 2023-12-21 RX ADMIN — Medication 47.7 MICROGRAM(S)/KG/MIN: at 16:00

## 2023-12-21 RX ADMIN — Medication 6.82 MICROGRAM(S)/KG/MIN: at 09:48

## 2023-12-21 RX ADMIN — LEVETIRACETAM 400 MILLIGRAM(S): 250 TABLET, FILM COATED ORAL at 06:01

## 2023-12-21 RX ADMIN — MEROPENEM 1000 MILLIGRAM(S): 1 INJECTION INTRAVENOUS at 13:22

## 2023-12-21 RX ADMIN — MIDAZOLAM HYDROCHLORIDE 4 MILLIGRAM(S): 1 INJECTION, SOLUTION INTRAMUSCULAR; INTRAVENOUS at 07:45

## 2023-12-21 RX ADMIN — PROPOFOL 4.37 MICROGRAM(S)/KG/MIN: 10 INJECTION, EMULSION INTRAVENOUS at 23:34

## 2023-12-21 RX ADMIN — DEXMEDETOMIDINE HYDROCHLORIDE IN 0.9% SODIUM CHLORIDE 9.1 MICROGRAM(S)/KG/HR: 4 INJECTION INTRAVENOUS at 09:47

## 2023-12-21 RX ADMIN — SODIUM CHLORIDE 75 MILLILITER(S): 9 INJECTION, SOLUTION INTRAVENOUS at 20:39

## 2023-12-21 RX ADMIN — DEXMEDETOMIDINE HYDROCHLORIDE IN 0.9% SODIUM CHLORIDE 9.1 MICROGRAM(S)/KG/HR: 4 INJECTION INTRAVENOUS at 12:42

## 2023-12-21 NOTE — CONSULT NOTE ADULT - ASSESSMENT
ASSESSMENT: Patient is a 83y old male with prolonged hospital course. Initially had craniectomy for frontal IPH c/b Afib requiring AC. Also found to have an aortic vegetation with multiple strokes. This morning he was found unresponsive, was intubated and upgraded to the MICU. He became acutely distended with worsening acidosis, worsening WBC, and requiring increasing doses of vasopressors raising concern for bowel ischemia, general surgery was contacted to rule out ischemic bowel.  CT imaging showed no evidence of ischemic bowel, however showed a previously seen endoleak, stable sac size that improved.      [facility] was contacted for a endoleak with a stable sac size, previously seen by vascular surgery in November.      PLAN:    - No acute vascular surgery intervention   - Rest of care pending primary team      ASSESSMENT: Patient is a 83y old male with prolonged hospital course. Initially had craniectomy for frontal IPH c/b Afib requiring AC. Also found to have an aortic vegetation with multiple strokes. This morning he was found unresponsive, was intubated and upgraded to the MICU. He became acutely distended with worsening acidosis, worsening WBC, and requiring increasing doses of vasopressors raising concern for bowel ischemia, general surgery was contacted to rule out ischemic bowel.  CT imaging showed no evidence of ischemic bowel, however showed a previously seen endoleak, stable sac size that improved.      [facility]ascular [facility] was contacted for a endoleak with a stable sac size, previously seen by vascular surgery in November.      PLAN:    - No acute vascular surgery intervention   - Rest of care pending primary team

## 2023-12-21 NOTE — CONSULT NOTE ADULT - SUBJECTIVE AND OBJECTIVE BOX
ACUTE CARE SURGERY CONSULT     HPI: 83M PMH HTN, CAD s/p PCO, RCC s/p left nephrectomy, bladder Ca, BPH, CHF, Vertigo, HLD, AAA s/p EVAR, parox Afib, h/o HIT, Alzheimer transferred s/p fall with Rt frontal IPH. Initially had R hemicraniectomy 11/10/23 with drain placement; course complicated by AFib with RVR, Klebsiella PNA, hypoxic respiratory failure, LUE DVT and questionable increase in AAA size. Started on AC with waxing/waning mental status prompting head imaging with evidence of new bleed, so systemic A/C held. TTE showed mobile echodensity on aortic valve and strokes noted with multiple arterial territories, WHIT was recommended but family declined. Pt underwent cranioplasty on , and had subgaleal drain removed 12/10/23. ABx started for UTI. Pt re-upgraded to NSICU on 12/15 given worsening R frontal IPH, given Andexxa for reversal of NOAC, and subsequently downgraded on .    On 23 RRT called for unresponsiveness, vomiting episode, aspiration, and hypotension. Pt was intubated at the bedside, started on vasopressors, and transferred to the MICU.    After intubation, patient required increasing doses of vasopressors and his abdomen was noted to be distended which was a change since yesterday per primary team and wife who has been at bedside. This is the reason for general surgery consult. Patient examined at bedside and found to be intubated and sedated.      Vascular surgery contacted for a endoleak, that is known, with a stable sac size.      ROS: 10-system review is otherwise negative except HPI above.      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)  Abdominal aortic aneurysm (AAA) without rupture  Congestive heart failure, unspecified chronicity, unspecified heart failure type  Hypertension  History of abdominal aortic aneurysm (AAA)  CAD (coronary artery disease)  Alzheimers disease  urethral Stent 2008  stent placement and removal  S/P Cystoscopy  bladder polyps removal multiple times (since ), 6/10 , 10/2011 & 10/17/2011, , 2012, last 13, 2013, 2014  S/P Tonsillectomy  History of Lt  Nephrectomy  6/10 - left  History of cystoscopy  2015  H/O abdominal aortic aneurysm repair  S/P AAA repair  History of nephrectomy  Left  Presence of stent in LAD coronary artery  History of heart artery stent  DIONICIO      FAMILY HISTORY:  Family history of MI (myocardial infarction) (Father, Mother)  father  74y/o MI, mother  96y/o alzheimers    Family history of early CAD (Father, Mother)      Family history not pertinent as reviewed with the patient.    SOCIAL HISTORY:  Denies any toxic habits    ALLERGIES: NKA penicillin (Rash)  heparin (Other (Severe))  penicillin (Hives)  Cipro (Other)  Levaquin (Other)  heparin (Other)    HOME MEDICATIONS:  amLODIPine 2.5 mg oral tablet: 1 tab(s) orally once a day (2023 10:48)  atorvastatin 80 mg oral tablet: 1 tab(s) orally once a day (at bedtime) (2023 11:01)  metoprolol succinate 100 mg oral tablet, extended release: 1 tab(s) orally once a day (2023 10:48)  Namenda 10 mg oral tablet: 1 tab(s) orally 2 times a day (2023 11:01)  --------------------------------------------------------------------------------------------    PHYSICAL EXAM:   General: Sedated  Neuro: Head dressing in place.   HEENT: Not opening eyes, ETT in place, OG tube in place.   Cardio: RRR  Resp: Intubated  GI/Abd: Distended, not soft, not peritonitic.   --------------------------------------------------------------------------------------------    LABS                 8.7    6.50   )----------(  290       ( 21 Dec 2023 16:20 )               26.0      140    |  105    |  34.1   ----------------------------<  165        ( 21 Dec 2023 12:15 )  4.2     |  18.0   |  1.68     Ca    8.8        ( 21 Dec 2023 12:15 )  Phos  5.8       ( 21 Dec 2023 12:15 )  Mg     1.9       ( 21 Dec 2023 12:15 )    TPro  6.0    /  Alb  3.0    /  TBili  0.4    /  DBili  x      /  AST  16     /  ALT  9      /  AlkPhos  128    ( 21 Dec 2023 12:15 )    LIVER FUNCTIONS - ( 21 Dec 2023 12:15 )  Alb: 3.0 g/dL / Pro: 6.0 g/dL / ALK PHOS: 128 U/L / ALT: 9 U/L / AST: 16 U/L / GGT: x           PT/INR -  13.1 sec / 1.19 ratio   ( 21 Dec 2023 12:15 )       PTT -  27.2 sec   ( 21 Dec 2023 12:15 )  CAPILLARY BLOOD GLUCOSE        Urinalysis Basic - ( 21 Dec 2023 12:15 )    Color: x / Appearance: x / SG: x / pH: x  Gluc: 165 mg/dL / Ketone: x  / Bili: x / Urobili: x   Blood: x / Protein: x / Nitrite: x   Leuk Esterase: x / RBC: x / WBC x   Sq Epi: x / Non Sq Epi: x / Bacteria: x      ABG - ( 21 Dec 2023 15:35 )  pH: 7.320 /  pCO2: 39    /  pO2: 272   / HCO3: 20    / Base Excess: -6.0  /  SaO2: 100.0       12:07 - VBG - pH: 7.200 | pCO2: 57    | pO2: <42   | Lactate: 3.00     --------------------------------------------------------------------------------------------  IMAGING    FINDINGS:    CHEST:    LUNGS AND LARGE AIRWAYS:  PLEURA:    Endotracheal tube, tip above the level michael.  The central airways are patent.    There are diffuse bilateral upper and lower lobe groundglass centrilobular and tree-in-bud nodular opacities  There are bibasilar lower lobe airspace consolidations and left lingula airspace consolidation.    There is a trace left pleural effusion.    VESSELS:  Atherosclerotic changes thoracic aorta with coronary artery calcification and/or stent.  No central pulmonary embolism is noted.    HEART:   Heart size is normal.  Aortic and mitral valvular calcification. No pericardial effusion.    MEDIASTINUM AND CHERY:  No enlarged lymphadenopathy.    CHEST WALL AND LOWER NECK: Within normal limits.    ABDOMEN AND PELVIS:    LIVER: Within normal limits.  BILE DUCTS: Normal caliber.  GALLBLADDER: Dependent sludge or gallstones.  SPLEEN: Small, peripheral wedge-shaped defect posterior spleen, could reflect infarct.  PANCREAS: Within normal limits.  ADRENALS: Within normal limits.  KIDNEYS/URETERS:  Left nephrectomy.  Multiple indeterminate right renal hypodense lesions.  No hydronephrosis.    BLADDER: Collapsed around Vallecillo catheter.  REPRODUCTIVE ORGANS: Mildly prominent prostate gland.    BOWEL:  Nasogastric tube within stomach which is nondistended.  There is a distended stomach to  Air filled colon extending to the rectum without bowel obstruction.  Rectal probe is present.  No extraluminal gas or congenital fluid collection.   PERITONEUM: No ascites.    VESSELS:  Aortobiiliac stent grafting right renal artery graft.    The native aneurysm sac measures 7.1 cm, stable from prior exam.  There is suggestion of contrast within the aneurysm sac near the proximal stent graft attachment on arterial phase imaging, which becomes more pronounced on delayed phase imaging (5:62, 63 and 10:114,115).    There is a dilated left common iliac artery measuring, 2 cm.  The iliac arteries are patent.    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  The superior mesenteric artery is patent.  The superior mesenteric vein and portal venous confluence is patent.    RETROPERITONEUM/LYMPH NODES:  No enlarged lymphadenopathy.  No retroperitoneal hematoma.    ABDOMINAL WALL:  Small bilateral fat-containing inguinal hernias.  Right femoral venous catheter.    BONES: Degenerative changes.    IMPRESSION:    Diffuse groundglass centrilobular and tree-in-bud nodular opacities which is suggestive of infectious or aspiration bronchiolitis.    Bilateral lower lobe airspace consolidations may represent pneumonia and/or atelectasis.    Findings suggestive of endoleak near the proximal stent graft attachment; evaluation is somewhat limited without noncontrast exam.  Stable size of the aneurysm sac.    Diffuse colonic fecal retention with colonic distention.  No CT evidence for acute thromboembolic mesenteric ischemia.   ACUTE CARE SURGERY CONSULT     HPI: 83M PMH HTN, CAD s/p PCO, RCC s/p left nephrectomy, bladder Ca, BPH, CHF, Vertigo, HLD, AAA s/p EVAR, parox Afib, h/o HIT, Alzheimer transferred s/p fall with Rt frontal IPH. Initially had R hemicraniectomy 11/10/23 with drain placement; course complicated by AFib with RVR, Klebsiella PNA, hypoxic respiratory failure, LUE DVT and questionable increase in AAA size. Started on AC with waxing/waning mental status prompting head imaging with evidence of new bleed, so systemic A/C held. TTE showed mobile echodensity on aortic valve and strokes noted with multiple arterial territories, WHIT was recommended but family declined. Pt underwent cranioplasty on , and had subgaleal drain removed 12/10/23. ABx started for UTI. Pt re-upgraded to NSICU on 12/15 given worsening R frontal IPH, given Andexxa for reversal of NOAC, and subsequently downgraded on .    On 23 RRT called for unresponsiveness, vomiting episode, aspiration, and hypotension. Pt was intubated at the bedside, started on vasopressors, and transferred to the MICU.    After intubation, patient required increasing doses of vasopressors and his abdomen was noted to be distended which was a change since yesterday per primary team and wife who has been at bedside. This is the reason for general surgery consult. Patient examined at bedside and found to be intubated and sedated.      Vascular surgery contacted for a endoleak, that is known, with a stable sac size.      ROS: 10-system review is otherwise negative except HPI above.      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)  Abdominal aortic aneurysm (AAA) without rupture  Congestive heart failure, unspecified chronicity, unspecified heart failure type  Hypertension  History of abdominal aortic aneurysm (AAA)  CAD (coronary artery disease)  Alzheimers disease  urethral Stent 2008  stent placement and removal  S/P Cystoscopy  bladder polyps removal multiple times (since ), 6/10 , 10/2011 & 10/17/2011, , 2012, last 13, 2013, 2014  S/P Tonsillectomy  History of Lt  Nephrectomy  6/10 - left  History of cystoscopy  2015  H/O abdominal aortic aneurysm repair  S/P AAA repair  History of nephrectomy  Left  Presence of stent in LAD coronary artery  History of heart artery stent  DIONICIO      FAMILY HISTORY:  Family history of MI (myocardial infarction) (Father, Mother)  father  76y/o MI, mother  96y/o alzheimers    Family history of early CAD (Father, Mother)      Family history not pertinent as reviewed with the patient.    SOCIAL HISTORY:  Denies any toxic habits    ALLERGIES: NKA penicillin (Rash)  heparin (Other (Severe))  penicillin (Hives)  Cipro (Other)  Levaquin (Other)  heparin (Other)    HOME MEDICATIONS:  amLODIPine 2.5 mg oral tablet: 1 tab(s) orally once a day (2023 10:48)  atorvastatin 80 mg oral tablet: 1 tab(s) orally once a day (at bedtime) (2023 11:01)  metoprolol succinate 100 mg oral tablet, extended release: 1 tab(s) orally once a day (2023 10:48)  Namenda 10 mg oral tablet: 1 tab(s) orally 2 times a day (2023 11:01)  --------------------------------------------------------------------------------------------    PHYSICAL EXAM:   General: Sedated  Neuro: Head dressing in place.   HEENT: Not opening eyes, ETT in place, OG tube in place.   Cardio: RRR  Resp: Intubated  GI/Abd: Distended, not soft, not peritonitic.   --------------------------------------------------------------------------------------------    LABS                 8.7    6.50   )----------(  290       ( 21 Dec 2023 16:20 )               26.0      140    |  105    |  34.1   ----------------------------<  165        ( 21 Dec 2023 12:15 )  4.2     |  18.0   |  1.68     Ca    8.8        ( 21 Dec 2023 12:15 )  Phos  5.8       ( 21 Dec 2023 12:15 )  Mg     1.9       ( 21 Dec 2023 12:15 )    TPro  6.0    /  Alb  3.0    /  TBili  0.4    /  DBili  x      /  AST  16     /  ALT  9      /  AlkPhos  128    ( 21 Dec 2023 12:15 )    LIVER FUNCTIONS - ( 21 Dec 2023 12:15 )  Alb: 3.0 g/dL / Pro: 6.0 g/dL / ALK PHOS: 128 U/L / ALT: 9 U/L / AST: 16 U/L / GGT: x           PT/INR -  13.1 sec / 1.19 ratio   ( 21 Dec 2023 12:15 )       PTT -  27.2 sec   ( 21 Dec 2023 12:15 )  CAPILLARY BLOOD GLUCOSE        Urinalysis Basic - ( 21 Dec 2023 12:15 )    Color: x / Appearance: x / SG: x / pH: x  Gluc: 165 mg/dL / Ketone: x  / Bili: x / Urobili: x   Blood: x / Protein: x / Nitrite: x   Leuk Esterase: x / RBC: x / WBC x   Sq Epi: x / Non Sq Epi: x / Bacteria: x      ABG - ( 21 Dec 2023 15:35 )  pH: 7.320 /  pCO2: 39    /  pO2: 272   / HCO3: 20    / Base Excess: -6.0  /  SaO2: 100.0       12:07 - VBG - pH: 7.200 | pCO2: 57    | pO2: <42   | Lactate: 3.00     --------------------------------------------------------------------------------------------  IMAGING    FINDINGS:    CHEST:    LUNGS AND LARGE AIRWAYS:  PLEURA:    Endotracheal tube, tip above the level michael.  The central airways are patent.    There are diffuse bilateral upper and lower lobe groundglass centrilobular and tree-in-bud nodular opacities  There are bibasilar lower lobe airspace consolidations and left lingula airspace consolidation.    There is a trace left pleural effusion.    VESSELS:  Atherosclerotic changes thoracic aorta with coronary artery calcification and/or stent.  No central pulmonary embolism is noted.    HEART:   Heart size is normal.  Aortic and mitral valvular calcification. No pericardial effusion.    MEDIASTINUM AND CHERY:  No enlarged lymphadenopathy.    CHEST WALL AND LOWER NECK: Within normal limits.    ABDOMEN AND PELVIS:    LIVER: Within normal limits.  BILE DUCTS: Normal caliber.  GALLBLADDER: Dependent sludge or gallstones.  SPLEEN: Small, peripheral wedge-shaped defect posterior spleen, could reflect infarct.  PANCREAS: Within normal limits.  ADRENALS: Within normal limits.  KIDNEYS/URETERS:  Left nephrectomy.  Multiple indeterminate right renal hypodense lesions.  No hydronephrosis.    BLADDER: Collapsed around Vallecillo catheter.  REPRODUCTIVE ORGANS: Mildly prominent prostate gland.    BOWEL:  Nasogastric tube within stomach which is nondistended.  There is a distended stomach to  Air filled colon extending to the rectum without bowel obstruction.  Rectal probe is present.  No extraluminal gas or congenital fluid collection.   PERITONEUM: No ascites.    VESSELS:  Aortobiiliac stent grafting right renal artery graft.    The native aneurysm sac measures 7.1 cm, stable from prior exam.  There is suggestion of contrast within the aneurysm sac near the proximal stent graft attachment on arterial phase imaging, which becomes more pronounced on delayed phase imaging (5:62, 63 and 10:114,115).    There is a dilated left common iliac artery measuring, 2 cm.  The iliac arteries are patent.    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  The superior mesenteric artery is patent.  The superior mesenteric vein and portal venous confluence is patent.    RETROPERITONEUM/LYMPH NODES:  No enlarged lymphadenopathy.  No retroperitoneal hematoma.    ABDOMINAL WALL:  Small bilateral fat-containing inguinal hernias.  Right femoral venous catheter.    BONES: Degenerative changes.    IMPRESSION:    Diffuse groundglass centrilobular and tree-in-bud nodular opacities which is suggestive of infectious or aspiration bronchiolitis.    Bilateral lower lobe airspace consolidations may represent pneumonia and/or atelectasis.    Findings suggestive of endoleak near the proximal stent graft attachment; evaluation is somewhat limited without noncontrast exam.  Stable size of the aneurysm sac.    Diffuse colonic fecal retention with colonic distention.  No CT evidence for acute thromboembolic mesenteric ischemia.

## 2023-12-21 NOTE — CONSULT NOTE ADULT - ASSESSMENT
ASSESSMENT: Patient is a 83y old male with prolonged hospital course. Initially had craniectomy for frontal IPH c/b Afib requiring AC. Also found to have an aortic vegetation with multiple strokes. This morning he was found unresponsive, was intubated and upgraded to the MICU. He became acutely distended with worsening acidosis, worsening WBC, and requiring increasing doses of vasopressors raising concern for bowel ischemia.     I had a conversation with wife and 2 daughters at the bedside about the suspicion of bowel ischemia and their treatment goals. They previously refused a WHIT. Family will have a discussion and will reengage.     PLAN:    - Will follow up on family discussion, rest of plan to follow  - If decision is made to proceed with care, surgical intervention will be considered  - Plan discussed with Attending, Dr. Pacheco

## 2023-12-21 NOTE — PROGRESS NOTE ADULT - ASSESSMENT
83M w/ PMH HTN, CAD s/p stents on ASA/Plavix, RCC s/p L nephrectomy, bladder CA, BPH, CHF, LBBB, vertigo, HLD, 5.5 cm AAA without rupture post EVAR, paroxysmal afib on Eliquis, early stage Alzheimer's dementia, presented to [facility] 11/10/23 s/p unwitnessed fall with head strike at home found by wife, found with multicompartmental ICH, s/p R craniectomy 11/10/23, course significant for Klebsiella pneumonia, hypernatremia, lethargy, LUE DVT, new anterior falcine SDH.  12/7 Right Cranioplasty  -CTH s/p starting eliquis showed new R frontal ICH. 4hr repeat CTH showed worsening R frontal ICH.   -Rpt CTH stable and patient was downgraded from NSICU 83M w/ PMH HTN, CAD s/p stents on ASA/Plavix, RCC s/p L nephrectomy, bladder CA, BPH, CHF, LBBB, vertigo, HLD, 5.5 cm AAA without rupture post EVAR, paroxysmal afib on Eliquis, early stage Alzheimer's dementia, presented to [facility] 11/10/23 s/p unwitnessed fall with head strike at home found by wife, found with multicompartmental ICH, s/p R craniectomy 11/10/23, course significant for Klebsiella pneumonia, hypernatremia, lethargy, LUE DVT, new anterior falcine SDH.  12/7 Right Cranioplasty  -CTH s/p starting eliquis showed new R frontal ICH. 4hr repeat CTH showed worsening R frontal ICH.   -Rpt CTH stable and patient was downgraded from NSICU 83M w/ PMH HTN, CAD s/p stents on ASA/Plavix, RCC s/p L nephrectomy, bladder CA, BPH, CHF, LBBB, vertigo, HLD, 5.5 cm AAA without rupture post EVAR, paroxysmal afib on Eliquis, early stage Alzheimer's dementia, presented to [facility] 11/10/23 s/p unwitnessed fall with head strike at home found by wife, found with multicompartmental ICH, s/p R craniectomy 11/10/23, course significant for Klebsiella pneumonia, hypernatremia, lethargy, LUE DVT, new anterior falcine SDH.  12/7 Right Cranioplasty, CTH s/p starting eliquis showed new R frontal ICH. 4hr repeat CTH showed worsening R frontal ICH. Rpt CTH stable and patient was downgraded from NSICU. Overnight the patient became unresponsive and the patient was subsequently  intubated and upgraded to MICU    Plan:  -Case discussed with Marty Larios, no surgical intervention planned at this time  -CTH done and reviewed, recommend a rpt CTH in AM for stability  - Continue keppra 500 mg BID  -No need to wrap head  -Medical management per MICU   83M w/ PMH HTN, CAD s/p stents on ASA/Plavix, RCC s/p L nephrectomy, bladder CA, BPH, CHF, LBBB, vertigo, HLD, 5.5 cm AAA without rupture post EVAR, paroxysmal afib on Eliquis, early stage Alzheimer's dementia, presented to [facility] 11/10/23 s/p unwitnessed fall with head strike at home found by wife, found with multicompartmental ICH, s/p R craniectomy 11/10/23, course significant for Klebsiella pneumonia, hypernatremia, lethargy, LUE DVT, new anterior falcine SDH.  12/7 Right Cranioplasty, CTH s/p starting eliquis showed new R frontal ICH. 4hr repeat CTH showed worsening R frontal ICH. Rpt CTH stable and patient was downgraded from NSICU. Overnight the patient became unresponsive and the patient was subsequently  intubated and upgraded to MICU    Plan:  -Case discussed with Marty Larios, no surgical intervention planned at this time  -CTH done and reviewed, recommend a rpt CTH in AM for stability  - Continue keppra 500 mg BID  -No need to wrap head  -Medical management per MICU

## 2023-12-21 NOTE — PROCEDURE NOTE - ADDITIONAL PROCEDURE DETAILS
9cc of clear spinal fluid removed, [id number] opening pressure. Tolerated procedure well. 9cc of clear spinal fluid removed, [id number] opening pressure. Tolerated procedure well.

## 2023-12-21 NOTE — CONSULT NOTE ADULT - SUBJECTIVE AND OBJECTIVE BOX
Patient is a 83y old  Male who presents with a chief complaint of s/p unwitnessed fall with head strike (21 Dec 2023 08:21)      BRIEF HOSPITAL COURSE:   83M PMH HTN, CAD s/p PCO, RCC s/p left nephrectomy, bladder Ca, BPH, CHF, Vertigo, HLD, AAA s/p EVAR, parox Afib, h/o HIT, Alzheimer transferred s/p fall with Rt frontal IPH. Initially had R hemicraniectomy 11/10/23 with drain placement; course complicated by AFib with RVR, Klebsiella PNA, hypoxic respiratory failure, LUE DVT and questionable increase in AAA size. Started on AC with waxing/waning mental status prompting head imaging with evidence of new bleed, so systemic A/C held. TTE showed mobile echodensity on aortic valve and strokes noted with multiple arterial territories, WHIT was recommended but family declined. Pt underwent cranioplasty on , and had subgaleal drain removed 12/10/23. ABx started for UTI. Pt re-upgraded to NSICU on 12/15 given worsening R frontal IPH, given Andexxa for reversal of NOAC, and subsequently downgraded on .    On 23 RRT called for unresponsiveness, vomiting episode, aspiration, and hypotension. Pt was intubated at the bedside, started on vasopressors, and transferred to the MICU.    Events last 24 hours: ***    PAST MEDICAL & SURGICAL HISTORY:  HTN  dx       Bladder Cancer (ICD9 188.9)  TCC, dx       Hyperlipemia (ICD9 272.4)  dx       Lt Renal Neoplasm  left - s/p left nephrectomy      Hx antineoplastic chemotherapy (BCG )      Left bundle branch block      Vertigo      Heel spur, left      Abdominal aortic aneurysm (AAA)  5.5 cm      BPH (benign prostatic hyperplasia)      Renal mass, right  current - following with Dr Pamela SHEARER (coronary artery disease)      Bladder cancer, primary, with metastasis from bladder to other site  Left kidney      Acute renal failure, unspecified acute renal failure type      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)      Abdominal aortic aneurysm (AAA) without rupture      Congestive heart failure, unspecified chronicity, unspecified heart failure type      Hypertension      History of abdominal aortic aneurysm (AAA)      CAD (coronary artery disease)      Alzheimers disease      urethral Stent 2008  stent placement and removal      S/P Cystoscopy  bladder polyps removal multiple times (since ), 6/10 , 10/2011 & 10/17/2011, , 2012, last 13, 2013, 2014      S/P Tonsillectomy      History of Lt  Nephrectomy  6/10 - left      History of cystoscopy  2015      H/O abdominal aortic aneurysm repair      S/P AAA repair      History of nephrectomy  Left      Presence of stent in LAD coronary artery      History of heart artery stent  DIONICIO        Allergies    penicillin (Rash)  heparin (Other (Severe))  penicillin (Hives)  Cipro (Other)  Levaquin (Other)  heparin (Other)    Intolerances      FAMILY HISTORY:  Family history of MI (myocardial infarction) (Father, Mother)  father  74y/o MI, mother  94y/o alzheimers    Family history of early CAD (Father, Mother)        Family history otherwise noncontributory.    Social History: ***    Review of Systems:  CONSTITUTIONAL:  No fevers, chills  HEAD: No headache  EYES: No blurry vision  ENT: No epistaxis, rhinorrhea, sore throat  CARDIOVASCULAR:  No chest pain, no palpitations  RESPIRATORY:  As per HPI  GASTROINTESTINAL:  No abdominal pain, N/V/D  GENITOURINARY:  No dysuria, frequency or urgency  NEUROLOGIC:  No seizures or headaches  EXTREMITIES: No leg swelling  PSYCHIATRIC:  No disorder of thought or mood    ALL OTHER REVIEW OF SYSTEMS EXCEPT PER HPI NEGATIVE.      Medications:  meropenem Injectable 1000 milliGRAM(s) IV Push every 8 hours    doxazosin 2 milliGRAM(s) Oral at bedtime  norepinephrine Infusion 0.05 MICROgram(s)/kG/Min IV Continuous <Continuous>      acetaminophen     Tablet .. 650 milliGRAM(s) Oral every 6 hours PRN  acetaminophen  Suppository .. 650 milliGRAM(s) Rectal once  dexMEDEtomidine Infusion 0.5 MICROgram(s)/kG/Hr IV Continuous <Continuous>  fentaNYL   Infusion. 0.5 MICROgram(s)/kG/Hr IV Continuous <Continuous>  levETIRAcetam  IVPB 500 milliGRAM(s) IV Intermittent every 12 hours  memantine 5 milliGRAM(s) Oral two times a day        pantoprazole  Injectable 80 milliGRAM(s) IV Push once  pantoprazole Infusion 8 mG/Hr IV Continuous <Continuous>  polyethylene glycol 3350 17 Gram(s) Oral daily  senna 2 Tablet(s) Oral at bedtime      atorvastatin 80 milliGRAM(s) Oral at bedtime    folic acid 1 milliGRAM(s) Oral daily  lactated ringers Bolus 1000 milliLiter(s) IV Bolus once  Nephro-roma 1 Tablet(s) Oral daily      artificial tears (preservative free) Ophthalmic Solution 1 Drop(s) Both EYES every 6 hours PRN  chlorhexidine 2% Cloths 1 Application(s) Topical <User Schedule>        Mode: AC/ CMV (Assist Control/ Continuous Mandatory Ventilation)  RR (machine): 12  TV (machine): 0.45  FiO2: 60  PEEP: 8  MAP: 18  PIP: 36      ICU Vital Signs Last 24 Hrs  T(C): 37.9 (21 Dec 2023 11:30), Max: 38.3 (21 Dec 2023 00:46)  T(F): 100.2 (21 Dec 2023 11:30), Max: 100.9 (21 Dec 2023 00:46)  HR: 107 (21 Dec 2023 11:30) (87 - 127)  BP: 97/60 (21 Dec 2023 11:30) (68/47 - 145/65)  BP(mean): 72 (21 Dec 2023 11:30) (55 - 105)  ABP: --  ABP(mean): --  RR: 34 (21 Dec 2023 11:30) (18 - 42)  SpO2: 100% (21 Dec 2023 11:30) (85% - 100%)    O2 Parameters below as of 21 Dec 2023 10:00  Patient On (Oxygen Delivery Method): ventilator    O2 Concentration (%): 60      Vital Signs Last 24 Hrs  T(C): 37.9 (21 Dec 2023 11:30), Max: 38.3 (21 Dec 2023 00:46)  T(F): 100.2 (21 Dec 2023 11:30), Max: 100.9 (21 Dec 2023 00:46)  HR: 107 (21 Dec 2023 11:30) (87 - 127)  BP: 97/60 (21 Dec 2023 11:30) (68/47 - 145/65)  BP(mean): 72 (21 Dec 2023 11:30) (55 - 105)  RR: 34 (21 Dec 2023 11:30) (18 - 42)  SpO2: 100% (21 Dec 2023 11:30) (85% - 100%)    Parameters below as of 21 Dec 2023 10:00  Patient On (Oxygen Delivery Method): ventilator    O2 Concentration (%): 60    ABG - ( 21 Dec 2023 07:38 )  pH, Arterial: 7.350 pH, Blood: x     /  pCO2: 41    /  pO2: 99    / HCO3: 23    / Base Excess: -3.0  /  SaO2: 99.7                I&O's Detail    21 Dec 2023 07:01  -  21 Dec 2023 11:44  --------------------------------------------------------  IN:    Dexmedetomidine: 18 mL    Dexmedetomidine: 9 mL    FentaNYL: 3.7 mL    FentaNYL: 3.7 mL    IV PiggyBack: 250 mL    Norepinephrine: 14 mL    Pantoprazole: 30 mL  Total IN: 328.4 mL    OUT:    Indwelling Catheter - Urethral (mL): 260 mL  Total OUT: 260 mL    Total NET: 68.4 mL            LABS:                        9.5    12.10 )-----------( 350      ( 21 Dec 2023 05:56 )             29.0     12-    140  |  103  |  26.2<H>  ----------------------------<  203<H>  3.7   |  21.0<L>  |  1.09    Ca    9.2      21 Dec 2023 05:56    TPro  6.3<L>  /  Alb  2.9<L>  /  TBili  0.2<L>  /  DBili  x   /  AST  11  /  ALT  7   /  AlkPhos  138<H>  12-21          CAPILLARY BLOOD GLUCOSE      POCT Blood Glucose.: 329 mg/dL (21 Dec 2023 07:34)      Urinalysis Basic - ( 21 Dec 2023 09:35 )    Color: Yellow / Appearance: Cloudy / S.024 / pH: x  Gluc: x / Ketone: Trace mg/dL  / Bili: Negative / Urobili: 0.2 mg/dL   Blood: x / Protein: 100 mg/dL / Nitrite: Negative   Leuk Esterase: Trace / RBC: 73 /HPF / WBC 13 /HPF   Sq Epi: x / Non Sq Epi: 0 /HPF / Bacteria: Negative /HPF      CULTURES:      Physical Examination:  GENERAL: In NAD   HEENT: NC/AT  NECK: Supple, trachea midline  PULM: ***  CVS: +S1, S2, RRR  ABD: Soft, non-tender  EXTREMITIES: No pedal edema B/L  SKIN: No open wounds  NEURO: Grossly non-focal    DEVICES:     RADIOLOGY: ***   Patient is a 83y old  Male who presents with a chief complaint of s/p unwitnessed fall with head strike (21 Dec 2023 08:21)      BRIEF HOSPITAL COURSE:   83M PMH HTN, CAD s/p PCO, RCC s/p left nephrectomy, bladder Ca, BPH, CHF, Vertigo, HLD, AAA s/p EVAR, parox Afib, h/o HIT, Alzheimer transferred s/p fall with Rt frontal IPH. Initially had R hemicraniectomy 11/10/23 with drain placement; course complicated by AFib with RVR, Klebsiella PNA, hypoxic respiratory failure, LUE DVT and questionable increase in AAA size. Started on AC with waxing/waning mental status prompting head imaging with evidence of new bleed, so systemic A/C held. TTE showed mobile echodensity on aortic valve and strokes noted with multiple arterial territories, WHIT was recommended but family declined. Pt underwent cranioplasty on , and had subgaleal drain removed 12/10/23. ABx started for UTI. Pt re-upgraded to NSICU on 12/15 given worsening R frontal IPH, given Andexxa for reversal of NOAC, and subsequently downgraded on .    On 23 RRT called for unresponsiveness, vomiting episode, aspiration, and hypotension. Pt was intubated at the bedside, started on vasopressors, and transferred to the MICU.    Events last 24 hours: ***    PAST MEDICAL & SURGICAL HISTORY:  HTN  dx       Bladder Cancer (ICD9 188.9)  TCC, dx       Hyperlipemia (ICD9 272.4)  dx       Lt Renal Neoplasm  left - s/p left nephrectomy      Hx antineoplastic chemotherapy (BCG )      Left bundle branch block      Vertigo      Heel spur, left      Abdominal aortic aneurysm (AAA)  5.5 cm      BPH (benign prostatic hyperplasia)      Renal mass, right  current - following with Dr Pamela SHEARER (coronary artery disease)      Bladder cancer, primary, with metastasis from bladder to other site  Left kidney      Acute renal failure, unspecified acute renal failure type      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)      Abdominal aortic aneurysm (AAA) without rupture      Congestive heart failure, unspecified chronicity, unspecified heart failure type      Hypertension      History of abdominal aortic aneurysm (AAA)      CAD (coronary artery disease)      Alzheimers disease      urethral Stent 2008  stent placement and removal      S/P Cystoscopy  bladder polyps removal multiple times (since ), 6/10 , 10/2011 & 10/17/2011, , 2012, last 13, 2013, 2014      S/P Tonsillectomy      History of Lt  Nephrectomy  6/10 - left      History of cystoscopy  2015      H/O abdominal aortic aneurysm repair      S/P AAA repair      History of nephrectomy  Left      Presence of stent in LAD coronary artery      History of heart artery stent  DIONICIO        Allergies    penicillin (Rash)  heparin (Other (Severe))  penicillin (Hives)  Cipro (Other)  Levaquin (Other)  heparin (Other)    Intolerances      FAMILY HISTORY:  Family history of MI (myocardial infarction) (Father, Mother)  father  76y/o MI, mother  96y/o alzheimers    Family history of early CAD (Father, Mother)        Family history otherwise noncontributory.      Review of Systems:  Unable to obtain.      Medications:  meropenem Injectable 1000 milliGRAM(s) IV Push every 8 hours    doxazosin 2 milliGRAM(s) Oral at bedtime  norepinephrine Infusion 0.05 MICROgram(s)/kG/Min IV Continuous <Continuous>      acetaminophen     Tablet .. 650 milliGRAM(s) Oral every 6 hours PRN  acetaminophen  Suppository .. 650 milliGRAM(s) Rectal once  dexMEDEtomidine Infusion 0.5 MICROgram(s)/kG/Hr IV Continuous <Continuous>  fentaNYL   Infusion. 0.5 MICROgram(s)/kG/Hr IV Continuous <Continuous>  levETIRAcetam  IVPB 500 milliGRAM(s) IV Intermittent every 12 hours  memantine 5 milliGRAM(s) Oral two times a day        pantoprazole  Injectable 80 milliGRAM(s) IV Push once  pantoprazole Infusion 8 mG/Hr IV Continuous <Continuous>  polyethylene glycol 3350 17 Gram(s) Oral daily  senna 2 Tablet(s) Oral at bedtime      atorvastatin 80 milliGRAM(s) Oral at bedtime    folic acid 1 milliGRAM(s) Oral daily  lactated ringers Bolus 1000 milliLiter(s) IV Bolus once  Nephro-roma 1 Tablet(s) Oral daily      artificial tears (preservative free) Ophthalmic Solution 1 Drop(s) Both EYES every 6 hours PRN  chlorhexidine 2% Cloths 1 Application(s) Topical <User Schedule>        Mode: AC/ CMV (Assist Control/ Continuous Mandatory Ventilation)  RR (machine): 12  TV (machine): 0.45  FiO2: 60  PEEP: 8  MAP: 18  PIP: 36      ICU Vital Signs Last 24 Hrs  T(C): 37.9 (21 Dec 2023 11:30), Max: 38.3 (21 Dec 2023 00:46)  T(F): 100.2 (21 Dec 2023 11:30), Max: 100.9 (21 Dec 2023 00:46)  HR: 107 (21 Dec 2023 11:30) (87 - 127)  BP: 97/60 (21 Dec 2023 11:30) (68/47 - 145/65)  BP(mean): 72 (21 Dec 2023 11:30) (55 - 105)  ABP: --  ABP(mean): --  RR: 34 (21 Dec 2023 11:30) (18 - 42)  SpO2: 100% (21 Dec 2023 11:30) (85% - 100%)    O2 Parameters below as of 21 Dec 2023 10:00  Patient On (Oxygen Delivery Method): ventilator    O2 Concentration (%): 60      Vital Signs Last 24 Hrs  T(C): 37.9 (21 Dec 2023 11:30), Max: 38.3 (21 Dec 2023 00:46)  T(F): 100.2 (21 Dec 2023 11:30), Max: 100.9 (21 Dec 2023 00:46)  HR: 107 (21 Dec 2023 11:30) (87 - 127)  BP: 97/60 (21 Dec 2023 11:30) (68/47 - 145/65)  BP(mean): 72 (21 Dec 2023 11:30) (55 - 105)  RR: 34 (21 Dec 2023 11:30) (18 - 42)  SpO2: 100% (21 Dec 2023 11:30) (85% - 100%)    Parameters below as of 21 Dec 2023 10:00  Patient On (Oxygen Delivery Method): ventilator    O2 Concentration (%): 60    ABG - ( 21 Dec 2023 07:38 )  pH, Arterial: 7.350 pH, Blood: x     /  pCO2: 41    /  pO2: 99    / HCO3: 23    / Base Excess: -3.0  /  SaO2: 99.7                I&O's Detail    21 Dec 2023 07:01  -  21 Dec 2023 11:44  --------------------------------------------------------  IN:    Dexmedetomidine: 18 mL    Dexmedetomidine: 9 mL    FentaNYL: 3.7 mL    FentaNYL: 3.7 mL    IV PiggyBack: 250 mL    Norepinephrine: 14 mL    Pantoprazole: 30 mL  Total IN: 328.4 mL    OUT:    Indwelling Catheter - Urethral (mL): 260 mL  Total OUT: 260 mL    Total NET: 68.4 mL            LABS:                        9.5    12.10 )-----------( 350      ( 21 Dec 2023 05:56 )             29.0     12    140  |  103  |  26.2<H>  ----------------------------<  203<H>  3.7   |  21.0<L>  |  1.09    Ca    9.2      21 Dec 2023 05:56    TPro  6.3<L>  /  Alb  2.9<L>  /  TBili  0.2<L>  /  DBili  x   /  AST  11  /  ALT  7   /  AlkPhos  138<H>  12-21          CAPILLARY BLOOD GLUCOSE      POCT Blood Glucose.: 329 mg/dL (21 Dec 2023 07:34)      Urinalysis Basic - ( 21 Dec 2023 09:35 )    Color: Yellow / Appearance: Cloudy / S.024 / pH: x  Gluc: x / Ketone: Trace mg/dL  / Bili: Negative / Urobili: 0.2 mg/dL   Blood: x / Protein: 100 mg/dL / Nitrite: Negative   Leuk Esterase: Trace / RBC: 73 /HPF / WBC 13 /HPF   Sq Epi: x / Non Sq Epi: 0 /HPF / Bacteria: Negative /HPF      CULTURES:      Physical Examination:  GENERAL: Unresponsive  HEENT: S/p cranioplasty  NECK: Supple  PULM: Shallow breathing  CVS: +S1, S2  ABD: Soft, non-tender  EXTREMITIES: No pedal edema B/L      DEVICES:     RADIOLOGY:   < from: CT Head No Cont (23 @ 08:24) >    ACC: [id number] EXAM:  CT BRAIN   ORDERED BY: RASHIDA CASTAÑEDA     *** ADDENDUM # 1 ***    Extracalvarial collection is again seen in the postoperative region. This   is compatible with a pseudomeningocele. This finding measures   approximately 1.3 cm widest diameter and previously measured   approximately 1.1 cm in widest diameter.    --- End of Report ---    *** END OF ADDENDUM # 1 ***      PROCEDURE DATE:  2023          INTERPRETATION:  Clinical indication: Follow-up bleed.    Multiple axial sections performed from the base of skull to vertex   without contrast enhancement. Coronal and sagittal reconstructions were   performed.    This exam is compared prior head CT performed on     Postoperative changes compatible the right temporal frontal parietal   cranioplasty is again seen when compared with the prior head CT.    Acute on chronic subdural hematoma involving the right temporal frontal   an parietal region is again identified. This finding demonstrates mild   decrease attenuation which is likely compatible with old expected   evolutionary changes. This finding measures approximately 0.8 cm widest   diameter and previously measured approximately 0.7 cm widest diameter.    Abnormal low-attenuation involving the right posterior frontal cortical   subcortical region is again seen with some associated areas of   hemorrhage. This appears unchanged when compared with the prior exam.    Acute parenchymal hemorrhage involving the right frontal region is again   seen. The largest of these areas of parenchymal hemorrhage measures   approximately 2.4 x 2.0 cm and previously measured approximately 2.1 x   1.7 cm. The size and configuration ventricles appear unchanged.   Intraventricular hemorrhage is again seen.    Parenchymal volume loss and chronic microvascular ischemic changes are   identified    The visualized paranasal sinuses mastoid and middle ear regions appear   clear.    Orotracheal and orogastric tubes are seen.    IMPRESSION: Areas of hemorrhage again seen. Previously noted acute on   chronic subdural hematoma as well as right frontal parenchymal hemorrhage   appears to have minimally increased in size.    --- End of Report ---    ***Please see the addendum at the top of this report. It maycontain   additional important information or changes.****        ABHISHEK BLANCO MD; Attending Radiologist  This document has been electronically signed. Dec 21 2023  8:36AM  1st Addendum: ABHISHEK BLANCO MD; Attending Radiologist  The first addendum was electronically signed on: Dec 21 2023  8:43AM.    < end of copied text >     Patient is a 83y old  Male who presents with a chief complaint of s/p unwitnessed fall with head strike (21 Dec 2023 08:21)      BRIEF HOSPITAL COURSE:   83M PMH HTN, CAD s/p PCO, RCC s/p left nephrectomy, bladder Ca, BPH, CHF, Vertigo, HLD, AAA s/p EVAR, parox Afib, h/o HIT, Alzheimer transferred s/p fall with Rt frontal IPH. Initially had R hemicraniectomy 11/10/23 with drain placement; course complicated by AFib with RVR, Klebsiella PNA, hypoxic respiratory failure, LUE DVT and questionable increase in AAA size. Started on AC with waxing/waning mental status prompting head imaging with evidence of new bleed, so systemic A/C held. TTE showed mobile echodensity on aortic valve and strokes noted with multiple arterial territories, WHIT was recommended but family declined. Pt underwent cranioplasty on , and had subgaleal drain removed 12/10/23. ABx started for UTI. Pt re-upgraded to NSICU on 12/15 given worsening R frontal IPH, given Andexxa for reversal of NOAC, and subsequently downgraded on .    On 23 RRT called for unresponsiveness, vomiting episode, aspiration, and hypotension. Pt was intubated at the bedside, started on vasopressors, and transferred to the MICU.    Events last 24 hours: ***    PAST MEDICAL & SURGICAL HISTORY:  HTN  dx       Bladder Cancer (ICD9 188.9)  TCC, dx       Hyperlipemia (ICD9 272.4)  dx       Lt Renal Neoplasm  left - s/p left nephrectomy      Hx antineoplastic chemotherapy (BCG )      Left bundle branch block      Vertigo      Heel spur, left      Abdominal aortic aneurysm (AAA)  5.5 cm      BPH (benign prostatic hyperplasia)      Renal mass, right  current - following with Dr Pamela SHEARER (coronary artery disease)      Bladder cancer, primary, with metastasis from bladder to other site  Left kidney      Acute renal failure, unspecified acute renal failure type      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)      Abdominal aortic aneurysm (AAA) without rupture      Congestive heart failure, unspecified chronicity, unspecified heart failure type      Hypertension      History of abdominal aortic aneurysm (AAA)      CAD (coronary artery disease)      Alzheimers disease      urethral Stent 2008  stent placement and removal      S/P Cystoscopy  bladder polyps removal multiple times (since ), 6/10 , 10/2011 & 10/17/2011, , 2012, last 13, 2013, 2014      S/P Tonsillectomy      History of Lt  Nephrectomy  6/10 - left      History of cystoscopy  2015      H/O abdominal aortic aneurysm repair      S/P AAA repair      History of nephrectomy  Left      Presence of stent in LAD coronary artery      History of heart artery stent  DIONICIO        Allergies    penicillin (Rash)  heparin (Other (Severe))  penicillin (Hives)  Cipro (Other)  Levaquin (Other)  heparin (Other)    Intolerances      FAMILY HISTORY:  Family history of MI (myocardial infarction) (Father, Mother)  father  76y/o MI, mother  96y/o alzheimers    Family history of early CAD (Father, Mother)        Family history otherwise noncontributory.      Review of Systems:  Unable to obtain.      Medications:  meropenem Injectable 1000 milliGRAM(s) IV Push every 8 hours    doxazosin 2 milliGRAM(s) Oral at bedtime  norepinephrine Infusion 0.05 MICROgram(s)/kG/Min IV Continuous <Continuous>      acetaminophen     Tablet .. 650 milliGRAM(s) Oral every 6 hours PRN  acetaminophen  Suppository .. 650 milliGRAM(s) Rectal once  dexMEDEtomidine Infusion 0.5 MICROgram(s)/kG/Hr IV Continuous <Continuous>  fentaNYL   Infusion. 0.5 MICROgram(s)/kG/Hr IV Continuous <Continuous>  levETIRAcetam  IVPB 500 milliGRAM(s) IV Intermittent every 12 hours  memantine 5 milliGRAM(s) Oral two times a day        pantoprazole  Injectable 80 milliGRAM(s) IV Push once  pantoprazole Infusion 8 mG/Hr IV Continuous <Continuous>  polyethylene glycol 3350 17 Gram(s) Oral daily  senna 2 Tablet(s) Oral at bedtime      atorvastatin 80 milliGRAM(s) Oral at bedtime    folic acid 1 milliGRAM(s) Oral daily  lactated ringers Bolus 1000 milliLiter(s) IV Bolus once  Nephro-roma 1 Tablet(s) Oral daily      artificial tears (preservative free) Ophthalmic Solution 1 Drop(s) Both EYES every 6 hours PRN  chlorhexidine 2% Cloths 1 Application(s) Topical <User Schedule>        Mode: AC/ CMV (Assist Control/ Continuous Mandatory Ventilation)  RR (machine): 12  TV (machine): 0.45  FiO2: 60  PEEP: 8  MAP: 18  PIP: 36      ICU Vital Signs Last 24 Hrs  T(C): 37.9 (21 Dec 2023 11:30), Max: 38.3 (21 Dec 2023 00:46)  T(F): 100.2 (21 Dec 2023 11:30), Max: 100.9 (21 Dec 2023 00:46)  HR: 107 (21 Dec 2023 11:30) (87 - 127)  BP: 97/60 (21 Dec 2023 11:30) (68/47 - 145/65)  BP(mean): 72 (21 Dec 2023 11:30) (55 - 105)  ABP: --  ABP(mean): --  RR: 34 (21 Dec 2023 11:30) (18 - 42)  SpO2: 100% (21 Dec 2023 11:30) (85% - 100%)    O2 Parameters below as of 21 Dec 2023 10:00  Patient On (Oxygen Delivery Method): ventilator    O2 Concentration (%): 60      Vital Signs Last 24 Hrs  T(C): 37.9 (21 Dec 2023 11:30), Max: 38.3 (21 Dec 2023 00:46)  T(F): 100.2 (21 Dec 2023 11:30), Max: 100.9 (21 Dec 2023 00:46)  HR: 107 (21 Dec 2023 11:30) (87 - 127)  BP: 97/60 (21 Dec 2023 11:30) (68/47 - 145/65)  BP(mean): 72 (21 Dec 2023 11:30) (55 - 105)  RR: 34 (21 Dec 2023 11:30) (18 - 42)  SpO2: 100% (21 Dec 2023 11:30) (85% - 100%)    Parameters below as of 21 Dec 2023 10:00  Patient On (Oxygen Delivery Method): ventilator    O2 Concentration (%): 60    ABG - ( 21 Dec 2023 07:38 )  pH, Arterial: 7.350 pH, Blood: x     /  pCO2: 41    /  pO2: 99    / HCO3: 23    / Base Excess: -3.0  /  SaO2: 99.7                I&O's Detail    21 Dec 2023 07:01  -  21 Dec 2023 11:44  --------------------------------------------------------  IN:    Dexmedetomidine: 18 mL    Dexmedetomidine: 9 mL    FentaNYL: 3.7 mL    FentaNYL: 3.7 mL    IV PiggyBack: 250 mL    Norepinephrine: 14 mL    Pantoprazole: 30 mL  Total IN: 328.4 mL    OUT:    Indwelling Catheter - Urethral (mL): 260 mL  Total OUT: 260 mL    Total NET: 68.4 mL            LABS:                        9.5    12.10 )-----------( 350      ( 21 Dec 2023 05:56 )             29.0     12    140  |  103  |  26.2<H>  ----------------------------<  203<H>  3.7   |  21.0<L>  |  1.09    Ca    9.2      21 Dec 2023 05:56    TPro  6.3<L>  /  Alb  2.9<L>  /  TBili  0.2<L>  /  DBili  x   /  AST  11  /  ALT  7   /  AlkPhos  138<H>  12-21          CAPILLARY BLOOD GLUCOSE      POCT Blood Glucose.: 329 mg/dL (21 Dec 2023 07:34)      Urinalysis Basic - ( 21 Dec 2023 09:35 )    Color: Yellow / Appearance: Cloudy / S.024 / pH: x  Gluc: x / Ketone: Trace mg/dL  / Bili: Negative / Urobili: 0.2 mg/dL   Blood: x / Protein: 100 mg/dL / Nitrite: Negative   Leuk Esterase: Trace / RBC: 73 /HPF / WBC 13 /HPF   Sq Epi: x / Non Sq Epi: 0 /HPF / Bacteria: Negative /HPF      CULTURES:      Physical Examination:  GENERAL: Unresponsive  HEENT: S/p cranioplasty  NECK: Supple  PULM: Shallow breathing  CVS: +S1, S2  ABD: Soft, non-tender  EXTREMITIES: No pedal edema B/L      DEVICES:     RADIOLOGY:   < from: CT Head No Cont (23 @ 08:24) >    ACC: [id number] EXAM:  CT BRAIN   ORDERED BY: RASHIDA CASTAÑEDA     *** ADDENDUM # 1 ***    Extracalvarial collection is again seen in the postoperative region. This   is compatible with a pseudomeningocele. This finding measures   approximately 1.3 cm widest diameter and previously measured   approximately 1.1 cm in widest diameter.    --- End of Report ---    *** END OF ADDENDUM # 1 ***      PROCEDURE DATE:  2023          INTERPRETATION:  Clinical indication: Follow-up bleed.    Multiple axial sections performed from the base of skull to vertex   without contrast enhancement. Coronal and sagittal reconstructions were   performed.    This exam is compared prior head CT performed on     Postoperative changes compatible the right temporal frontal parietal   cranioplasty is again seen when compared with the prior head CT.    Acute on chronic subdural hematoma involving the right temporal frontal   an parietal region is again identified. This finding demonstrates mild   decrease attenuation which is likely compatible with old expected   evolutionary changes. This finding measures approximately 0.8 cm widest   diameter and previously measured approximately 0.7 cm widest diameter.    Abnormal low-attenuation involving the right posterior frontal cortical   subcortical region is again seen with some associated areas of   hemorrhage. This appears unchanged when compared with the prior exam.    Acute parenchymal hemorrhage involving the right frontal region is again   seen. The largest of these areas of parenchymal hemorrhage measures   approximately 2.4 x 2.0 cm and previously measured approximately 2.1 x   1.7 cm. The size and configuration ventricles appear unchanged.   Intraventricular hemorrhage is again seen.    Parenchymal volume loss and chronic microvascular ischemic changes are   identified    The visualized paranasal sinuses mastoid and middle ear regions appear   clear.    Orotracheal and orogastric tubes are seen.    IMPRESSION: Areas of hemorrhage again seen. Previously noted acute on   chronic subdural hematoma as well as right frontal parenchymal hemorrhage   appears to have minimally increased in size.    --- End of Report ---    ***Please see the addendum at the top of this report. It maycontain   additional important information or changes.****        ABHISHEK BLANCO MD; Attending Radiologist  This document has been electronically signed. Dec 21 2023  8:36AM  1st Addendum: ABHISHEK BLANCO MD; Attending Radiologist  The first addendum was electronically signed on: Dec 21 2023  8:43AM.    < end of copied text >     Patient is a 83y old  Male who presents with a chief complaint of s/p unwitnessed fall with head strike (21 Dec 2023 08:21)      BRIEF HOSPITAL COURSE:   83M PMH HTN, CAD s/p PCO, RCC s/p left nephrectomy, bladder Ca, BPH, CHF, Vertigo, HLD, AAA s/p EVAR, parox Afib, h/o HIT, Alzheimer transferred s/p fall with Rt frontal IPH. Initially had R hemicraniectomy 11/10/23 with drain placement; course complicated by AFib with RVR, Klebsiella PNA, hypoxic respiratory failure, LUE DVT and questionable increase in AAA size. Started on AC with waxing/waning mental status prompting head imaging with evidence of new bleed, so systemic A/C held. TTE showed mobile echodensity on aortic valve and strokes noted with multiple arterial territories, WHIT was recommended but family declined. Pt underwent cranioplasty on , and had subgaleal drain removed 12/10/23. ABx started for UTI. Pt re-upgraded to NSICU on 12/15 given worsening R frontal IPH, given Andexxa for reversal of NOAC, and subsequently downgraded on .    On 23 RRT called for unresponsiveness, vomiting episode, aspiration, and hypotension. Pt was intubated at the bedside, started on vasopressors, and transferred to the MICU.      PAST MEDICAL & SURGICAL HISTORY:  HTN  dx       Bladder Cancer (ICD9 188.9)  TCC, dx       Hyperlipemia (ICD9 272.4)  dx       Lt Renal Neoplasm  left - s/p left nephrectomy      Hx antineoplastic chemotherapy (BCG )      Left bundle branch block      Vertigo      Heel spur, left      Abdominal aortic aneurysm (AAA)  5.5 cm      BPH (benign prostatic hyperplasia)      Renal mass, right  current - following with Dr Pamela SHEARER (coronary artery disease)      Bladder cancer, primary, with metastasis from bladder to other site  Left kidney      Acute renal failure, unspecified acute renal failure type      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)      Abdominal aortic aneurysm (AAA) without rupture      Congestive heart failure, unspecified chronicity, unspecified heart failure type      Hypertension      History of abdominal aortic aneurysm (AAA)      CAD (coronary artery disease)      Alzheimers disease      urethral Stent 2008  stent placement and removal      S/P Cystoscopy  bladder polyps removal multiple times (since ), 6/10 , 10/2011 & 10/17/2011, , 2012, last 13, 2013, 2014      S/P Tonsillectomy      History of Lt  Nephrectomy  6/10 - left      History of cystoscopy  2015      H/O abdominal aortic aneurysm repair      S/P AAA repair      History of nephrectomy  Left      Presence of stent in LAD coronary artery      History of heart artery stent  DIONICIO        Allergies    penicillin (Rash)  heparin (Other (Severe))  penicillin (Hives)  Cipro (Other)  Levaquin (Other)  heparin (Other)    Intolerances      FAMILY HISTORY:  Family history of MI (myocardial infarction) (Father, Mother)  father  76y/o MI, mother  96y/o alzheimers    Family history of early CAD (Father, Mother)        Family history otherwise noncontributory.      Review of Systems:  Unable to obtain.      Medications:  meropenem Injectable 1000 milliGRAM(s) IV Push every 8 hours    doxazosin 2 milliGRAM(s) Oral at bedtime  norepinephrine Infusion 0.05 MICROgram(s)/kG/Min IV Continuous <Continuous>      acetaminophen     Tablet .. 650 milliGRAM(s) Oral every 6 hours PRN  acetaminophen  Suppository .. 650 milliGRAM(s) Rectal once  dexMEDEtomidine Infusion 0.5 MICROgram(s)/kG/Hr IV Continuous <Continuous>  fentaNYL   Infusion. 0.5 MICROgram(s)/kG/Hr IV Continuous <Continuous>  levETIRAcetam  IVPB 500 milliGRAM(s) IV Intermittent every 12 hours  memantine 5 milliGRAM(s) Oral two times a day        pantoprazole  Injectable 80 milliGRAM(s) IV Push once  pantoprazole Infusion 8 mG/Hr IV Continuous <Continuous>  polyethylene glycol 3350 17 Gram(s) Oral daily  senna 2 Tablet(s) Oral at bedtime      atorvastatin 80 milliGRAM(s) Oral at bedtime    folic acid 1 milliGRAM(s) Oral daily  lactated ringers Bolus 1000 milliLiter(s) IV Bolus once  Nephro-roma 1 Tablet(s) Oral daily      artificial tears (preservative free) Ophthalmic Solution 1 Drop(s) Both EYES every 6 hours PRN  chlorhexidine 2% Cloths 1 Application(s) Topical <User Schedule>        Mode: AC/ CMV (Assist Control/ Continuous Mandatory Ventilation)  RR (machine): 12  TV (machine): 0.45  FiO2: 60  PEEP: 8  MAP: 18  PIP: 36      ICU Vital Signs Last 24 Hrs  T(C): 37.9 (21 Dec 2023 11:30), Max: 38.3 (21 Dec 2023 00:46)  T(F): 100.2 (21 Dec 2023 11:30), Max: 100.9 (21 Dec 2023 00:46)  HR: 107 (21 Dec 2023 11:30) (87 - 127)  BP: 97/60 (21 Dec 2023 11:30) (68/47 - 145/65)  BP(mean): 72 (21 Dec 2023 11:30) (55 - 105)  ABP: --  ABP(mean): --  RR: 34 (21 Dec 2023 11:30) (18 - 42)  SpO2: 100% (21 Dec 2023 11:30) (85% - 100%)    O2 Parameters below as of 21 Dec 2023 10:00  Patient On (Oxygen Delivery Method): ventilator    O2 Concentration (%): 60      Vital Signs Last 24 Hrs  T(C): 37.9 (21 Dec 2023 11:30), Max: 38.3 (21 Dec 2023 00:46)  T(F): 100.2 (21 Dec 2023 11:30), Max: 100.9 (21 Dec 2023 00:46)  HR: 107 (21 Dec 2023 11:30) (87 - 127)  BP: 97/60 (21 Dec 2023 11:30) (68/47 - 145/65)  BP(mean): 72 (21 Dec 2023 11:30) (55 - 105)  RR: 34 (21 Dec 2023 11:30) (18 - 42)  SpO2: 100% (21 Dec 2023 11:30) (85% - 100%)    Parameters below as of 21 Dec 2023 10:00  Patient On (Oxygen Delivery Method): ventilator    O2 Concentration (%): 60    ABG - ( 21 Dec 2023 07:38 )  pH, Arterial: 7.350 pH, Blood: x     /  pCO2: 41    /  pO2: 99    / HCO3: 23    / Base Excess: -3.0  /  SaO2: 99.7                I&O's Detail    21 Dec 2023 07:01  -  21 Dec 2023 11:44  --------------------------------------------------------  IN:    Dexmedetomidine: 18 mL    Dexmedetomidine: 9 mL    FentaNYL: 3.7 mL    FentaNYL: 3.7 mL    IV PiggyBack: 250 mL    Norepinephrine: 14 mL    Pantoprazole: 30 mL  Total IN: 328.4 mL    OUT:    Indwelling Catheter - Urethral (mL): 260 mL  Total OUT: 260 mL    Total NET: 68.4 mL            LABS:                        9.5    12.10 )-----------( 350      ( 21 Dec 2023 05:56 )             29.0         140  |  103  |  26.2<H>  ----------------------------<  203<H>  3.7   |  21.0<L>  |  1.09    Ca    9.2      21 Dec 2023 05:56    TPro  6.3<L>  /  Alb  2.9<L>  /  TBili  0.2<L>  /  DBili  x   /  AST  11  /  ALT  7   /  AlkPhos  138<H>  12-          CAPILLARY BLOOD GLUCOSE      POCT Blood Glucose.: 329 mg/dL (21 Dec 2023 07:34)      Urinalysis Basic - ( 21 Dec 2023 09:35 )    Color: Yellow / Appearance: Cloudy / S.024 / pH: x  Gluc: x / Ketone: Trace mg/dL  / Bili: Negative / Urobili: 0.2 mg/dL   Blood: x / Protein: 100 mg/dL / Nitrite: Negative   Leuk Esterase: Trace / RBC: 73 /HPF / WBC 13 /HPF   Sq Epi: x / Non Sq Epi: 0 /HPF / Bacteria: Negative /HPF      CULTURES:      Physical Examination:  GENERAL: Unresponsive  HEENT: S/p cranioplasty  NECK: Supple  PULM: Shallow breathing  CVS: +S1, S2  ABD: Soft, non-tender  EXTREMITIES: No pedal edema B/L      DEVICES:     RADIOLOGY:   < from: CT Head No Cont (23 @ 08:24) >    ACC: [id number] EXAM:  CT BRAIN   ORDERED BY: RASHIDA CASTAÑEDA     *** ADDENDUM # 1 ***    Extracalvarial collection is again seen in the postoperative region. This   is compatible with a pseudomeningocele. This finding measures   approximately 1.3 cm widest diameter and previously measured   approximately 1.1 cm in widest diameter.    --- End of Report ---    *** END OF ADDENDUM # 1 ***      PROCEDURE DATE:  2023          INTERPRETATION:  Clinical indication: Follow-up bleed.    Multiple axial sections performed from the base of skull to vertex   without contrast enhancement. Coronal and sagittal reconstructions were   performed.    This exam is compared prior head CT performed on     Postoperative changes compatible the right temporal frontal parietal   cranioplasty is again seen when compared with the prior head CT.    Acute on chronic subdural hematoma involving the right temporal frontal   an parietal region is again identified. This finding demonstrates mild   decrease attenuation which is likely compatible with old expected   evolutionary changes. This finding measures approximately 0.8 cm widest   diameter and previously measured approximately 0.7 cm widest diameter.    Abnormal low-attenuation involving the right posterior frontal cortical   subcortical region is again seen with some associated areas of   hemorrhage. This appears unchanged when compared with the prior exam.    Acute parenchymal hemorrhage involving the right frontal region is again   seen. The largest of these areas of parenchymal hemorrhage measures   approximately 2.4 x 2.0 cm and previously measured approximately 2.1 x   1.7 cm. The size and configuration ventricles appear unchanged.   Intraventricular hemorrhage is again seen.    Parenchymal volume loss and chronic microvascular ischemic changes are   identified    The visualized paranasal sinuses mastoid and middle ear regions appear   clear.    Orotracheal and orogastric tubes are seen.    IMPRESSION: Areas of hemorrhage again seen. Previously noted acute on   chronic subdural hematoma as well as right frontal parenchymal hemorrhage   appears to have minimally increased in size.    --- End of Report ---    ***Please see the addendum at the top of this report. It maycontain   additional important information or changes.****        ABHISHEK BLANCO MD; Attending Radiologist  This document has been electronically signed. Dec 21 2023  8:36AM  1st Addendum: ABHISHEK BLANCO MD; Attending Radiologist  The first addendum was electronically signed on: Dec 21 2023  8:43AM.    < end of copied text >     Patient is a 83y old  Male who presents with a chief complaint of s/p unwitnessed fall with head strike (21 Dec 2023 08:21)      BRIEF HOSPITAL COURSE:   83M PMH HTN, CAD s/p PCO, RCC s/p left nephrectomy, bladder Ca, BPH, CHF, Vertigo, HLD, AAA s/p EVAR, parox Afib, h/o HIT, Alzheimer transferred s/p fall with Rt frontal IPH. Initially had R hemicraniectomy 11/10/23 with drain placement; course complicated by AFib with RVR, Klebsiella PNA, hypoxic respiratory failure, LUE DVT and questionable increase in AAA size. Started on AC with waxing/waning mental status prompting head imaging with evidence of new bleed, so systemic A/C held. TTE showed mobile echodensity on aortic valve and strokes noted with multiple arterial territories, WHIT was recommended but family declined. Pt underwent cranioplasty on , and had subgaleal drain removed 12/10/23. ABx started for UTI. Pt re-upgraded to NSICU on 12/15 given worsening R frontal IPH, given Andexxa for reversal of NOAC, and subsequently downgraded on .    On 23 RRT called for unresponsiveness, vomiting episode, aspiration, and hypotension. Pt was intubated at the bedside, started on vasopressors, and transferred to the MICU.      PAST MEDICAL & SURGICAL HISTORY:  HTN  dx       Bladder Cancer (ICD9 188.9)  TCC, dx       Hyperlipemia (ICD9 272.4)  dx       Lt Renal Neoplasm  left - s/p left nephrectomy      Hx antineoplastic chemotherapy (BCG )      Left bundle branch block      Vertigo      Heel spur, left      Abdominal aortic aneurysm (AAA)  5.5 cm      BPH (benign prostatic hyperplasia)      Renal mass, right  current - following with Dr Pamela SHEARER (coronary artery disease)      Bladder cancer, primary, with metastasis from bladder to other site  Left kidney      Acute renal failure, unspecified acute renal failure type      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)      Abdominal aortic aneurysm (AAA) without rupture      Congestive heart failure, unspecified chronicity, unspecified heart failure type      Hypertension      History of abdominal aortic aneurysm (AAA)      CAD (coronary artery disease)      Alzheimers disease      urethral Stent 2008  stent placement and removal      S/P Cystoscopy  bladder polyps removal multiple times (since ), 6/10 , 10/2011 & 10/17/2011, , 2012, last 13, 2013, 2014      S/P Tonsillectomy      History of Lt  Nephrectomy  6/10 - left      History of cystoscopy  2015      H/O abdominal aortic aneurysm repair      S/P AAA repair      History of nephrectomy  Left      Presence of stent in LAD coronary artery      History of heart artery stent  DIONICIO        Allergies    penicillin (Rash)  heparin (Other (Severe))  penicillin (Hives)  Cipro (Other)  Levaquin (Other)  heparin (Other)    Intolerances      FAMILY HISTORY:  Family history of MI (myocardial infarction) (Father, Mother)  father  76y/o MI, mother  96y/o alzheimers    Family history of early CAD (Father, Mother)        Family history otherwise noncontributory.      Review of Systems:  Unable to obtain.      Medications:  meropenem Injectable 1000 milliGRAM(s) IV Push every 8 hours    doxazosin 2 milliGRAM(s) Oral at bedtime  norepinephrine Infusion 0.05 MICROgram(s)/kG/Min IV Continuous <Continuous>      acetaminophen     Tablet .. 650 milliGRAM(s) Oral every 6 hours PRN  acetaminophen  Suppository .. 650 milliGRAM(s) Rectal once  dexMEDEtomidine Infusion 0.5 MICROgram(s)/kG/Hr IV Continuous <Continuous>  fentaNYL   Infusion. 0.5 MICROgram(s)/kG/Hr IV Continuous <Continuous>  levETIRAcetam  IVPB 500 milliGRAM(s) IV Intermittent every 12 hours  memantine 5 milliGRAM(s) Oral two times a day        pantoprazole  Injectable 80 milliGRAM(s) IV Push once  pantoprazole Infusion 8 mG/Hr IV Continuous <Continuous>  polyethylene glycol 3350 17 Gram(s) Oral daily  senna 2 Tablet(s) Oral at bedtime      atorvastatin 80 milliGRAM(s) Oral at bedtime    folic acid 1 milliGRAM(s) Oral daily  lactated ringers Bolus 1000 milliLiter(s) IV Bolus once  Nephro-roma 1 Tablet(s) Oral daily      artificial tears (preservative free) Ophthalmic Solution 1 Drop(s) Both EYES every 6 hours PRN  chlorhexidine 2% Cloths 1 Application(s) Topical <User Schedule>        Mode: AC/ CMV (Assist Control/ Continuous Mandatory Ventilation)  RR (machine): 12  TV (machine): 0.45  FiO2: 60  PEEP: 8  MAP: 18  PIP: 36      ICU Vital Signs Last 24 Hrs  T(C): 37.9 (21 Dec 2023 11:30), Max: 38.3 (21 Dec 2023 00:46)  T(F): 100.2 (21 Dec 2023 11:30), Max: 100.9 (21 Dec 2023 00:46)  HR: 107 (21 Dec 2023 11:30) (87 - 127)  BP: 97/60 (21 Dec 2023 11:30) (68/47 - 145/65)  BP(mean): 72 (21 Dec 2023 11:30) (55 - 105)  ABP: --  ABP(mean): --  RR: 34 (21 Dec 2023 11:30) (18 - 42)  SpO2: 100% (21 Dec 2023 11:30) (85% - 100%)    O2 Parameters below as of 21 Dec 2023 10:00  Patient On (Oxygen Delivery Method): ventilator    O2 Concentration (%): 60      Vital Signs Last 24 Hrs  T(C): 37.9 (21 Dec 2023 11:30), Max: 38.3 (21 Dec 2023 00:46)  T(F): 100.2 (21 Dec 2023 11:30), Max: 100.9 (21 Dec 2023 00:46)  HR: 107 (21 Dec 2023 11:30) (87 - 127)  BP: 97/60 (21 Dec 2023 11:30) (68/47 - 145/65)  BP(mean): 72 (21 Dec 2023 11:30) (55 - 105)  RR: 34 (21 Dec 2023 11:30) (18 - 42)  SpO2: 100% (21 Dec 2023 11:30) (85% - 100%)    Parameters below as of 21 Dec 2023 10:00  Patient On (Oxygen Delivery Method): ventilator    O2 Concentration (%): 60    ABG - ( 21 Dec 2023 07:38 )  pH, Arterial: 7.350 pH, Blood: x     /  pCO2: 41    /  pO2: 99    / HCO3: 23    / Base Excess: -3.0  /  SaO2: 99.7                I&O's Detail    21 Dec 2023 07:01  -  21 Dec 2023 11:44  --------------------------------------------------------  IN:    Dexmedetomidine: 18 mL    Dexmedetomidine: 9 mL    FentaNYL: 3.7 mL    FentaNYL: 3.7 mL    IV PiggyBack: 250 mL    Norepinephrine: 14 mL    Pantoprazole: 30 mL  Total IN: 328.4 mL    OUT:    Indwelling Catheter - Urethral (mL): 260 mL  Total OUT: 260 mL    Total NET: 68.4 mL            LABS:                        9.5    12.10 )-----------( 350      ( 21 Dec 2023 05:56 )             29.0         140  |  103  |  26.2<H>  ----------------------------<  203<H>  3.7   |  21.0<L>  |  1.09    Ca    9.2      21 Dec 2023 05:56    TPro  6.3<L>  /  Alb  2.9<L>  /  TBili  0.2<L>  /  DBili  x   /  AST  11  /  ALT  7   /  AlkPhos  138<H>  12-          CAPILLARY BLOOD GLUCOSE      POCT Blood Glucose.: 329 mg/dL (21 Dec 2023 07:34)      Urinalysis Basic - ( 21 Dec 2023 09:35 )    Color: Yellow / Appearance: Cloudy / S.024 / pH: x  Gluc: x / Ketone: Trace mg/dL  / Bili: Negative / Urobili: 0.2 mg/dL   Blood: x / Protein: 100 mg/dL / Nitrite: Negative   Leuk Esterase: Trace / RBC: 73 /HPF / WBC 13 /HPF   Sq Epi: x / Non Sq Epi: 0 /HPF / Bacteria: Negative /HPF      CULTURES:      Physical Examination:  GENERAL: Unresponsive  HEENT: S/p cranioplasty  NECK: Supple  PULM: Shallow breathing  CVS: +S1, S2  ABD: Soft, non-tender  EXTREMITIES: No pedal edema B/L      DEVICES:     RADIOLOGY:   < from: CT Head No Cont (23 @ 08:24) >    ACC: [id number] EXAM:  CT BRAIN   ORDERED BY: RASHIDA CASTAÑEDA     *** ADDENDUM # 1 ***    Extracalvarial collection is again seen in the postoperative region. This   is compatible with a pseudomeningocele. This finding measures   approximately 1.3 cm widest diameter and previously measured   approximately 1.1 cm in widest diameter.    --- End of Report ---    *** END OF ADDENDUM # 1 ***      PROCEDURE DATE:  2023          INTERPRETATION:  Clinical indication: Follow-up bleed.    Multiple axial sections performed from the base of skull to vertex   without contrast enhancement. Coronal and sagittal reconstructions were   performed.    This exam is compared prior head CT performed on     Postoperative changes compatible the right temporal frontal parietal   cranioplasty is again seen when compared with the prior head CT.    Acute on chronic subdural hematoma involving the right temporal frontal   an parietal region is again identified. This finding demonstrates mild   decrease attenuation which is likely compatible with old expected   evolutionary changes. This finding measures approximately 0.8 cm widest   diameter and previously measured approximately 0.7 cm widest diameter.    Abnormal low-attenuation involving the right posterior frontal cortical   subcortical region is again seen with some associated areas of   hemorrhage. This appears unchanged when compared with the prior exam.    Acute parenchymal hemorrhage involving the right frontal region is again   seen. The largest of these areas of parenchymal hemorrhage measures   approximately 2.4 x 2.0 cm and previously measured approximately 2.1 x   1.7 cm. The size and configuration ventricles appear unchanged.   Intraventricular hemorrhage is again seen.    Parenchymal volume loss and chronic microvascular ischemic changes are   identified    The visualized paranasal sinuses mastoid and middle ear regions appear   clear.    Orotracheal and orogastric tubes are seen.    IMPRESSION: Areas of hemorrhage again seen. Previously noted acute on   chronic subdural hematoma as well as right frontal parenchymal hemorrhage   appears to have minimally increased in size.    --- End of Report ---    ***Please see the addendum at the top of this report. It maycontain   additional important information or changes.****        ABHISHEK BLANCO MD; Attending Radiologist  This document has been electronically signed. Dec 21 2023  8:36AM  1st Addendum: ABHISHEK BLANCO MD; Attending Radiologist  The first addendum was electronically signed on: Dec 21 2023  8:43AM.    < end of copied text >

## 2023-12-21 NOTE — RAPID RESPONSE TEAM SUMMARY - NSADDTLFINDINGSRRT_GEN_ALL_CORE
VITALS at 7:34AM  T(C): 38   HR: 122 regular   BP: 103/46   RR: 31 labored   SpO2: 95% on room air     PHYSICAL EXAM  CONSTITUTIONAL: patient without airway protection, +gargling, significant apparent distress    MICU called immediately for intubation.

## 2023-12-21 NOTE — CONSULT NOTE ADULT - TIME BILLING
AV lesion, pAfib, Traumatic SDH
Reviewing imaging studies and pt's chart and discussing case with Dr. Flores, MICU attending.

## 2023-12-21 NOTE — CONSULT NOTE ADULT - PROBLEM SELECTOR RECOMMENDATION 9
- TTE on 11/11 found to have moderately calcified aortic valve with mobile echodensity . May be artifact vs [id number] calcifications vs vegetation   - Blood cultures sent and pending results.   - Patient found to be febrile and has leukocytosis.   - Once blood cultures are positive, will consider WHIT to evaluate valves.
Likely secondary to stress related mucosal disease. Agree with Pantoprazole infusion. NGT to LIS. Keep NPO. Case and management d/w ELIZABETH Hernandez attending

## 2023-12-21 NOTE — RAPID RESPONSE TEAM SUMMARY - NSSITUATIONBACKGROUNDRRT_GEN_ALL_CORE
Patient is an 83 year old male with PMH of HTN, CAD s/p PCO, RCC s/p left nephrectomy, bladder Ca, BPH, CHF, Vertigo, HLD, AAA s/p EVAR, parox Afib, h/o HIT, Alzheimer; transferred s/p fall with Rt frontal IPH s/p decompressive R hemicraniotomy 11/10 and s/p R cranioplasty with replacement of bone flap 12/7. Patient with recent upgrade to neuro ICU after cranioplasty. Downgraded to medicine team 12/10, noted to be febrile with unremarkable work-up.  CT head obtained on 12/15 showed new increase in right frontal IPH. Upgraded again to neuroICU. Was given andexxa for reversal. Repeat CT heads were noted as stable and patient was downgraded to medicine on 12/17.   RR called for airway concerns after likely aspiration overnight. MICU called for intubation. Neurosurgery at bedside with concerns for change in neuro status, warranting CT head after stabilization. Family called at this time and no response.

## 2023-12-21 NOTE — RAPID RESPONSE TEAM SUMMARY - NSOTHERINTERVENTIONSRRT_GEN_ALL_CORE
ABG   ABG - ( 21 Dec 2023 07:38 )  pH, Arterial: 7.350 pH, Blood: x     /  pCO2: 41    /  pO2: 99    / HCO3: 23    / Base Excess: -3.0  /  SaO2: 99.7         ABG   ABG - ( 21 Dec 2023 07:38 )  pH, Arterial: 7.350 pH, Blood: x     /  pCO2: 41    /  pO2: 99    / HCO3: 23    / Base Excess: -3.0  /  SaO2: 99.7      CT head urgent after stabilization

## 2023-12-21 NOTE — PROCEDURE NOTE - NSCHLORHEXIDINEBATH_GEN_A_CORE
2% wipes/To be discontinued when line removed
2% wipes/To be discontinued when line removed
2% wipes
2% wipes

## 2023-12-21 NOTE — PHARMACOTHERAPY INTERVENTION NOTE - COMMENTS
Called patient's pharmacy for medication list
Trauma B, RSI
Vancomycin dosing
~CrCl=29ml/min, renally adjusted namenda to 5mg BID
est AUC/ANDERS 470 with a dose of 1 g daily
notified MD to renew

## 2023-12-21 NOTE — PROVIDER CONTACT NOTE (CHANGE IN STATUS NOTIFICATION) - SITUATION
patient has an increase in lethargy; arouses to vigorous stimulation. Patient not stating name and . patient noted to have increase weakness in RUE and RLE. Patient pupils unequal Right pupil 5mm and Left pupil 2mm. Brisk and reactive.
Patient admitted for nontraumatic intracerebral hemorrhage

## 2023-12-21 NOTE — CHART NOTE - NSCHARTNOTEFT_GEN_A_CORE
Due to concern regarding patient's shock state, using contrast to determine if patient has mesenteric ischemia outweighs potential risks of contrast induced nephropathy even with elevated Cr.

## 2023-12-21 NOTE — CHART NOTE - NSCHARTNOTEFT_GEN_A_CORE
Called by RN for pt's rectal temp of 100.4 w/ tachycardia -130s.  Reviewed chart, pt was hypotensive w/ low grade fever 12/20 am w/ AMS, fever w/u done, no clear source of infection, antibiotics held.  CT head stable findings.  UA was never sent yesterday.  RVP negative.  Unable to get HPI due to pt's baseline altered mental status.    ICU Vital Signs Last 24 Hrs  T(C): 38 (21 Dec 2023 06:00), Max: 38.3 (21 Dec 2023 00:46)  T(F): 100.4 (21 Dec 2023 06:00), Max: 100.9 (21 Dec 2023 00:46)  HR: 127 (21 Dec 2023 06:00) (87 - 127)  BP: 109/53 (21 Dec 2023 06:00) (105/53 - 145/65)  BP(mean): 62 (21 Dec 2023 06:00) (62 - 85)  ABP: --  ABP(mean): --  RR: 32 (21 Dec 2023 06:00) (18 - 32)  SpO2: 92% (21 Dec 2023 06:00) (92% - 99%) on RA; RN to apply O2 to maintain SpO2 above 94%    O2 Parameters below as of 21 Dec 2023 06:00  Patient On (Oxygen Delivery Method): room air    General    Pt is lethargic, arousable w/ gurgling respiratory sounds  Lungs       Rhonchi b/l w/ decreased BS in lower fields  Heart       s1/s2 tachycardic    Fever    UA still pending  Lactate added to am labs  Will repeat CXR due to change in respiratory distress  Will sign out to day team to f/u, likely need to cover for possible aspiration PNA

## 2023-12-21 NOTE — PROGRESS NOTE ADULT - SUBJECTIVE AND OBJECTIVE BOX
HPI:   82y M with PMH HTN, CAD s/p stents, renal cell carcinoma status post left nephrectomy, bladder CA, BPH, CHF, LBBB, vertigo,  HLD, 5.5cm AAA without rupture post EVAR, paroxysmal A-fib on Eliquis, Plavix, and aspirin, early stage Alzheimer dementia transferred from [facility] s/p unwitnessed fall with head strike at home found by wife on floor at 8am. Patient brought to urgent care by wife and had 5 staples to posterior scalp but was instructed to go to nearest ED.  CT head at [place] showed R frontal IPH, SDH, SAH at 9:00. CTA stroke protocol not performed at [facility]. Patient BIB on 6L NC.  Pt GCS 15 on arrival, A&Ox2 on arrival. After initial assessment, patient noted to be vomiting enroute to CT scanner.   (10 Nov 2023 11:04)    INTERVAL HPI  11/10 Right hemicraniectomy  11/26 Re upgraded to NSICU for an acute new anterior falx SDH  12/7 Right Cranioplasty  12/10 Drain Dc'd  12/14 Started on Eliquis  12/15 Rpt CTH shows new right frontal IPH, rpt CTH slight increase  12/16 Rpt CTH Stable  12/17 Downgraded from NSICU    OVERNIGHT EVENTS:  RRT called, patient reportedly febrile, hypotensive and unresponsive    82 yo male, unresponsive and unable to provide complaints.       Vital Signs Last 24 Hrs  T(C): 38 (21 Dec 2023 08:30), Max: 38.3 (21 Dec 2023 00:46)  T(F): 100.4 (21 Dec 2023 08:30), Max: 100.9 (21 Dec 2023 00:46)  HR: 127 (21 Dec 2023 06:00) (87 - 127)  BP: 95/59 (21 Dec 2023 08:30) (95/59 - 145/65)  BP(mean): 70 (21 Dec 2023 08:30) (62 - 85)  RR: 32 (21 Dec 2023 06:00) (18 - 32)  SpO2: 92% (21 Dec 2023 06:00) (92% - 99%)    Parameters below as of 21 Dec 2023 08:30  Patient On (Oxygen Delivery Method): ventilator      PHYSICAL EXAM:  GENERAL: Unresponsive  WOUND: Clean, dry and intact  MENTAL STATUS: No eye opening, not responding, not following commands  CRANIAL NERVES: Pupils 3mm R  MOTOR: No motor responsive   ABDOMEN: Distended  SKIN: Warm, dry; no rashes or lesions    LABS:                        9.5    12.10 )-----------( 350      ( 21 Dec 2023 05:56 )             29.0     12-21    140  |  103  |  26.2<H>  ----------------------------<  203<H>  3.7   |  21.0<L>  |  1.09    Ca    9.2      21 Dec 2023 05:56    TPro  6.3<L>  /  Alb  2.9<L>  /  TBili  0.2<L>  /  DBili  x   /  AST  11  /  ALT  7   /  AlkPhos  138<H>  12-21      Urinalysis Basic - ( 21 Dec 2023 05:56 )    Color: x / Appearance: x / SG: x / pH: x  Gluc: 203 mg/dL / Ketone: x  / Bili: x / Urobili: x   Blood: x / Protein: x / Nitrite: x   Leuk Esterase: x / RBC: x / WBC x   Sq Epi: x / Non Sq Epi: x / Bacteria: x    RADIOLOGY & ADDITIONAL TESTS:  < from: CT Head No Cont (12.21.23 @ 08:24) >  IMPRESSION: Areas of hemorrhage again seen. Previously noted acute on   chronic subdural hematoma as well as right frontal parenchymal hemorrhage   appears to have minimally increased in size.    --- End of Report ---    ***Please see the addendum at the top of this report. It maycontain   additional important information or changes.****    ABHISHEK BLANCO MD; Attending Radiologist  This document has been electronically signed. Dec 21 2023  8:36AM  1st Addendum: ABHISHEK BLANCO MD; Attending Radiologist  The first addendum was electronically signed on: Dec 21 2023  8:43AM.    < end of copied text >    < from: CT Head No Cont (12.20.23 @ 09:05) >  IMPRESSION: Stable intracranial hemorrhages.    --- End of Report ---  AUSTIN RIVERA MD; Attending Radiologist  This document has been electronically signed. Dec 20 2023 10:03AM    < end of copied text >    < from: CT Head No Cont (12.19.23 @ 00:59) >  IMPRESSION:    1.  Grossly stable hemorrhagic foci within the right frontal lobe.  2.  Postoperative changes related to a prior right calvarial craniectomy   with cranioplasty. Stable mixed density subdural hematoma deep to the   craniectomy bed.  3.  Subgaleal fluid collection overlying the cranioplasty, mildly   increased in size. This could be postoperative in nature, underlying CSF   leak is not entirely excluded.  4.  Prominent ventricular size, however grossly stable.    --- End of Report ---  TAMMIE FARRELL MD; Attending Radiologist  This document has been electronically signed. Dec 19 2023  8:55AM    < end of copied text >   HPI:   82y M with PMH HTN, CAD s/p stents, renal cell carcinoma status post left nephrectomy, bladder CA, BPH, CHF, LBBB, vertigo,  HLD, 5.5cm AAA without rupture post EVAR, paroxysmal A-fib on Eliquis, Plavix, and aspirin, early stage Alzheimer dementia transferred from [facility] s/p unwitnessed fall with head strike at home found by wife on floor at 8am. Patient brought to urgent care by wife and had 5 staples to posterior scalp but was instructed to go to nearest ED.  CT head at [place] showed R frontal IPH, SDH, SAH at 9:00. CTA stroke protocol not performed at [facility]. Patient BIB on 6L NC.  Pt GCS 15 on arrival, A&Ox2 on arrival. After initial assessment, patient noted to be vomiting enroute to CT scanner.   (10 Nov 2023 11:04)    INTERVAL HPI  11/10 Right hemicraniectomy  11/26 Re upgraded to NSICU for an acute new anterior falx SDH  12/7 Right Cranioplasty  12/10 Drain Dc'd  12/14 Started on Eliquis  12/15 Rpt CTH shows new right frontal IPH, rpt CTH slight increase  12/16 Rpt CTH Stable  12/17 Downgraded from NSICU    OVERNIGHT EVENTS:  RRT called, patient reportedly febrile, hypotensive and unresponsive    82 yo male, unresponsive and unable to provide complaints.       Vital Signs Last 24 Hrs  T(C): 38 (21 Dec 2023 08:30), Max: 38.3 (21 Dec 2023 00:46)  T(F): 100.4 (21 Dec 2023 08:30), Max: 100.9 (21 Dec 2023 00:46)  HR: 127 (21 Dec 2023 06:00) (87 - 127)  BP: 95/59 (21 Dec 2023 08:30) (95/59 - 145/65)  BP(mean): 70 (21 Dec 2023 08:30) (62 - 85)  RR: 32 (21 Dec 2023 06:00) (18 - 32)  SpO2: 92% (21 Dec 2023 06:00) (92% - 99%)    Parameters below as of 21 Dec 2023 08:30  Patient On (Oxygen Delivery Method): ventilator      PHYSICAL EXAM:  GENERAL: Unresponsive  WOUND: Clean, dry and intact  MENTAL STATUS: No eye opening, not responding, not following commands  CRANIAL NERVES: Pupils 3mm R  MOTOR: No motor responsive   ABDOMEN: Distended  SKIN: Warm, dry; no rashes or lesions    LABS:                        9.5    12.10 )-----------( 350      ( 21 Dec 2023 05:56 )             29.0     12-21    140  |  103  |  26.2<H>  ----------------------------<  203<H>  3.7   |  21.0<L>  |  1.09    Ca    9.2      21 Dec 2023 05:56    TPro  6.3<L>  /  Alb  2.9<L>  /  TBili  0.2<L>  /  DBili  x   /  AST  11  /  ALT  7   /  AlkPhos  138<H>  12-21      Urinalysis Basic - ( 21 Dec 2023 05:56 )    Color: x / Appearance: x / SG: x / pH: x  Gluc: 203 mg/dL / Ketone: x  / Bili: x / Urobili: x   Blood: x / Protein: x / Nitrite: x   Leuk Esterase: x / RBC: x / WBC x   Sq Epi: x / Non Sq Epi: x / Bacteria: x    RADIOLOGY & ADDITIONAL TESTS:  < from: CT Head No Cont (12.21.23 @ 08:24) >  IMPRESSION: Areas of hemorrhage again seen. Previously noted acute on   chronic subdural hematoma as well as right frontal parenchymal hemorrhage   appears to have minimally increased in size.    --- End of Report ---    ***Please see the addendum at the top of this report. It maycontain   additional important information or changes.****    ABHISHEK BLANCO MD; Attending Radiologist  This document has been electronically signed. Dec 21 2023  8:36AM  1st Addendum: ABHISHEK BLANCO MD; Attending Radiologist  The first addendum was electronically signed on: Dec 21 2023  8:43AM.    < end of copied text >    < from: CT Head No Cont (12.20.23 @ 09:05) >  IMPRESSION: Stable intracranial hemorrhages.    --- End of Report ---  AUSTIN RIVERA MD; Attending Radiologist  This document has been electronically signed. Dec 20 2023 10:03AM    < end of copied text >    < from: CT Head No Cont (12.19.23 @ 00:59) >  IMPRESSION:    1.  Grossly stable hemorrhagic foci within the right frontal lobe.  2.  Postoperative changes related to a prior right calvarial craniectomy   with cranioplasty. Stable mixed density subdural hematoma deep to the   craniectomy bed.  3.  Subgaleal fluid collection overlying the cranioplasty, mildly   increased in size. This could be postoperative in nature, underlying CSF   leak is not entirely excluded.  4.  Prominent ventricular size, however grossly stable.    --- End of Report ---  TAMMIE FARRELL MD; Attending Radiologist  This document has been electronically signed. Dec 19 2023  8:55AM    < end of copied text >

## 2023-12-21 NOTE — PROGRESS NOTE ADULT - SUBJECTIVE AND OBJECTIVE BOX
84 y/o M with a h/o HTN, CAD s/p PCO, RCC s/p left nephrectomy, bladder CA, BPH, CHF, Vertigo, HLD, AAA s/p EVAR, parox Afib, h/o HIT, Alzheimer's, initially admitted on 11/10 s/p fall with Rt frontal IPH. Underwent R hemicraniectomy on 11/10 with drain placement. Hospital course complicated by AFib with RVR, Klebsiella PNA, hypoxic respiratory failure, LUE DVT and questionable increase in AAA size. Started on AC with waxing/waning mental status prompting head imaging with evidence of new bleed, so systemic A/C held. TTE showed mobile echodensity on aortic valve and strokes noted within multiple arterial territories, WHIT was recommended but family declined. Pt underwent cranioplasty on 12/7, and had subgaleal drain removed 12/10. ABx started for UTI. Pt re-upgraded to NSICU on 12/15 given worsening R frontal IPH, given Andexxa for reversal of NOAC, and subsequently downgraded on 12/17. Transferred to MICU earlier today after developing altered mentation with vomiting and aspiration of dark liquid stomach content. Subsequently developed septic shock state requiring dual IV vasopressor support.    CT C/A/P suggestive of aspiration pneumonitis/pneumonia, questionable AAA endoleak, and diffuse colonic fecal retention with colonic distention. No evidence of mesenteric ischemia.    I contacted the vascular surgery team regarding the endoleak and they are not recommending any intervention at this time.    - actively titrating norepinephrine infusion to maintain a MAP > 65  - maintain fixed dose vasopressin  - bolus 1L LR x 1 and start infusion @ 75cc/hr  - start stress dose hydrocortisone  - blood cultures from 12/20 are negative for growth thus far, sputum culture is pending  - initial CSF studies appear noninfectious, f/u culture  - actively titrating ventilator settings to maintain SpO2 > 92%  - FiO2 weaned to 40%, maintain PEEP of 8 for now  - f/u ABG and adjust minute ventilation accordingly  - switch PPI gtt to IV BID, H/H is stable, minimal output from OGT  - mineral oil enema x 1 for severe constipation with +BM, continue senna and polyethylene glycol QD  - repeat CT brain yesterday showed minimal increase in SDH/IPH, neurosurgery team did not recommend any surgical intervention  - repeat CT brain ordered for this AM, cEEG ongoing, f/u report, continue seizure prophylaxis with Keppra  - overnight the patient awoke and was following some simple commands, subsequently started on propofol for sedation to promote vent synchrony and comfort    Case discussed with MICU physician, Dr. Teresa.      CRITICAL CARE TIME SPENT: 55 mins  Time spent evaluating/treating patient with medical issues that pose a high risk for life threatening deterioration and/or end-organ damage, reviewing data/labs/imaging, discussing case with multidisciplinary team, discussing plan/goals of care with patient/family. Non-inclusive of procedure time. The date of entry of this note reflects the date of services rendered.     82 y/o M with a h/o HTN, CAD s/p PCO, RCC s/p left nephrectomy, bladder CA, BPH, CHF, Vertigo, HLD, AAA s/p EVAR, parox Afib, h/o HIT, Alzheimer's, initially admitted on 11/10 s/p fall with Rt frontal IPH. Underwent R hemicraniectomy on 11/10 with drain placement. Hospital course complicated by AFib with RVR, Klebsiella PNA, hypoxic respiratory failure, LUE DVT and questionable increase in AAA size. Started on AC with waxing/waning mental status prompting head imaging with evidence of new bleed, so systemic A/C held. TTE showed mobile echodensity on aortic valve and strokes noted within multiple arterial territories, WHIT was recommended but family declined. Pt underwent cranioplasty on 12/7, and had subgaleal drain removed 12/10. ABx started for UTI. Pt re-upgraded to NSICU on 12/15 given worsening R frontal IPH, given Andexxa for reversal of NOAC, and subsequently downgraded on 12/17. Transferred to MICU earlier today after developing altered mentation with vomiting and aspiration of dark liquid stomach content. Subsequently developed septic shock state requiring dual IV vasopressor support.    CT C/A/P suggestive of aspiration pneumonitis/pneumonia, questionable AAA endoleak, and diffuse colonic fecal retention with colonic distention. No evidence of mesenteric ischemia.    I contacted the vascular surgery team regarding the endoleak and they are not recommending any intervention at this time.    - actively titrating norepinephrine infusion to maintain a MAP > 65  - maintain fixed dose vasopressin  - bolus 1L LR x 1 and start infusion @ 75cc/hr  - start stress dose hydrocortisone  - blood cultures from 12/20 are negative for growth thus far, sputum culture is pending  - initial CSF studies appear noninfectious, f/u culture  - actively titrating ventilator settings to maintain SpO2 > 92%  - FiO2 weaned to 40%, maintain PEEP of 8 for now  - f/u ABG and adjust minute ventilation accordingly  - switch PPI gtt to IV BID, H/H is stable, minimal output from OGT  - mineral oil enema x 1 for severe constipation with +BM, continue senna and polyethylene glycol QD  - repeat CT brain yesterday showed minimal increase in SDH/IPH, neurosurgery team did not recommend any surgical intervention  - repeat CT brain ordered for this AM, cEEG ongoing, f/u report, continue seizure prophylaxis with Keppra  - overnight the patient awoke and was following some simple commands, subsequently started on propofol for sedation to promote vent synchrony and comfort    Case discussed with MICU physician, Dr. Teresa.      CRITICAL CARE TIME SPENT: 55 mins  Time spent evaluating/treating patient with medical issues that pose a high risk for life threatening deterioration and/or end-organ damage, reviewing data/labs/imaging, discussing case with multidisciplinary team, discussing plan/goals of care with patient/family. Non-inclusive of procedure time. The date of entry of this note reflects the date of services rendered.

## 2023-12-21 NOTE — CONSULT NOTE ADULT - ATTENDING COMMENTS
Patient with known infrarenal AAA s/p EVAR with R renal stent hospitalized for intracranial bleeding and prolonged hospitalization. CTA of abdomen reveals incidental endoleak. The endoleak appears to be type 2 endoleak. The aneurysm sac is also decreasing in size. Given patients critical illness and sac regression no need for any type of interventions at this time. Vascular will sign off.

## 2023-12-21 NOTE — CONSULT NOTE ADULT - SUBJECTIVE AND OBJECTIVE BOX
ACUTE CARE SURGERY CONSULT     HPI: 83M PMH HTN, CAD s/p PCO, RCC s/p left nephrectomy, bladder Ca, BPH, CHF, Vertigo, HLD, AAA s/p EVAR, parox Afib, h/o HIT, Alzheimer transferred s/p fall with Rt frontal IPH. Initially had R hemicraniectomy 11/10/23 with drain placement; course complicated by AFib with RVR, Klebsiella PNA, hypoxic respiratory failure, LUE DVT and questionable increase in AAA size. Started on AC with waxing/waning mental status prompting head imaging with evidence of new bleed, so systemic A/C held. TTE showed mobile echodensity on aortic valve and strokes noted with multiple arterial territories, WHIT was recommended but family declined. Pt underwent cranioplasty on , and had subgaleal drain removed 12/10/23. ABx started for UTI. Pt re-upgraded to NSICU on 12/15 given worsening R frontal IPH, given Andexxa for reversal of NOAC, and subsequently downgraded on .    On 23 RRT called for unresponsiveness, vomiting episode, aspiration, and hypotension. Pt was intubated at the bedside, started on vasopressors, and transferred to the MICU.    After intubation, patient required increasing doses of vasopressors and his abdomen was noted to be distended which was a change since yesterday per primary team and wife who has been at bedside. This is the reason for general surgery consult. Patient examined at bedside and found to be intubated and sedated.      ROS: 10-system review is otherwise negative except HPI above.      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)  Abdominal aortic aneurysm (AAA) without rupture  Congestive heart failure, unspecified chronicity, unspecified heart failure type  Hypertension  History of abdominal aortic aneurysm (AAA)  CAD (coronary artery disease)  Alzheimers disease  urethral Stent 2008  stent placement and removal  S/P Cystoscopy  bladder polyps removal multiple times (since ), 6/10 , 10/2011 & 10/17/2011, , 2012, last 13, 2013, 2014  S/P Tonsillectomy  History of Lt  Nephrectomy  6/10 - left  History of cystoscopy  2015  H/O abdominal aortic aneurysm repair  S/P AAA repair  History of nephrectomy  Left  Presence of stent in LAD coronary artery  History of heart artery stent  DIONICIO      FAMILY HISTORY:  Family history of MI (myocardial infarction) (Father, Mother)  father  76y/o MI, mother  94y/o alzheimers    Family history of early CAD (Father, Mother)      Family history not pertinent as reviewed with the patient.    SOCIAL HISTORY:  Denies any toxic habits    ALLERGIES: NKA penicillin (Rash)  heparin (Other (Severe))  penicillin (Hives)  Cipro (Other)  Levaquin (Other)  heparin (Other)    HOME MEDICATIONS:  amLODIPine 2.5 mg oral tablet: 1 tab(s) orally once a day (2023 10:48)  atorvastatin 80 mg oral tablet: 1 tab(s) orally once a day (at bedtime) (2023 11:01)  metoprolol succinate 100 mg oral tablet, extended release: 1 tab(s) orally once a day (2023 10:48)  Namenda 10 mg oral tablet: 1 tab(s) orally 2 times a day (2023 11:01)  --------------------------------------------------------------------------------------------    PHYSICAL EXAM:   General: Sedated  Neuro: Head dressing in place.   HEENT: Not opening eyes, ETT in place, OG tube in place.   Cardio: RRR  Resp: Intubated  GI/Abd: Distended, not soft, not peritonitic.   --------------------------------------------------------------------------------------------    LABS                 8.7    6.50   )----------(  290       ( 21 Dec 2023 16:20 )               26.0      140    |  105    |  34.1   ----------------------------<  165        ( 21 Dec 2023 12:15 )  4.2     |  18.0   |  1.68     Ca    8.8        ( 21 Dec 2023 12:15 )  Phos  5.8       ( 21 Dec 2023 12:15 )  Mg     1.9       ( 21 Dec 2023 12:15 )    TPro  6.0    /  Alb  3.0    /  TBili  0.4    /  DBili  x      /  AST  16     /  ALT  9      /  AlkPhos  128    ( 21 Dec 2023 12:15 )    LIVER FUNCTIONS - ( 21 Dec 2023 12:15 )  Alb: 3.0 g/dL / Pro: 6.0 g/dL / ALK PHOS: 128 U/L / ALT: 9 U/L / AST: 16 U/L / GGT: x           PT/INR -  13.1 sec / 1.19 ratio   ( 21 Dec 2023 12:15 )       PTT -  27.2 sec   ( 21 Dec 2023 12:15 )  CAPILLARY BLOOD GLUCOSE        Urinalysis Basic - ( 21 Dec 2023 12:15 )    Color: x / Appearance: x / SG: x / pH: x  Gluc: 165 mg/dL / Ketone: x  / Bili: x / Urobili: x   Blood: x / Protein: x / Nitrite: x   Leuk Esterase: x / RBC: x / WBC x   Sq Epi: x / Non Sq Epi: x / Bacteria: x      ABG - ( 21 Dec 2023 15:35 )  pH: 7.320 /  pCO2: 39    /  pO2: 272   / HCO3: 20    / Base Excess: -6.0  /  SaO2: 100.0       12:07 - VBG - pH: 7.200 | pCO2: 57    | pO2: <42   | Lactate: 3.00     --------------------------------------------------------------------------------------------  IMAGING       ACUTE CARE SURGERY CONSULT     HPI: 83M PMH HTN, CAD s/p PCO, RCC s/p left nephrectomy, bladder Ca, BPH, CHF, Vertigo, HLD, AAA s/p EVAR, parox Afib, h/o HIT, Alzheimer transferred s/p fall with Rt frontal IPH. Initially had R hemicraniectomy 11/10/23 with drain placement; course complicated by AFib with RVR, Klebsiella PNA, hypoxic respiratory failure, LUE DVT and questionable increase in AAA size. Started on AC with waxing/waning mental status prompting head imaging with evidence of new bleed, so systemic A/C held. TTE showed mobile echodensity on aortic valve and strokes noted with multiple arterial territories, WHIT was recommended but family declined. Pt underwent cranioplasty on , and had subgaleal drain removed 12/10/23. ABx started for UTI. Pt re-upgraded to NSICU on 12/15 given worsening R frontal IPH, given Andexxa for reversal of NOAC, and subsequently downgraded on .    On 23 RRT called for unresponsiveness, vomiting episode, aspiration, and hypotension. Pt was intubated at the bedside, started on vasopressors, and transferred to the MICU.    After intubation, patient required increasing doses of vasopressors and his abdomen was noted to be distended which was a change since yesterday per primary team and wife who has been at bedside. This is the reason for general surgery consult. Patient examined at bedside and found to be intubated and sedated.      ROS: 10-system review is otherwise negative except HPI above.      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)  Abdominal aortic aneurysm (AAA) without rupture  Congestive heart failure, unspecified chronicity, unspecified heart failure type  Hypertension  History of abdominal aortic aneurysm (AAA)  CAD (coronary artery disease)  Alzheimers disease  urethral Stent 2008  stent placement and removal  S/P Cystoscopy  bladder polyps removal multiple times (since ), 6/10 , 10/2011 & 10/17/2011, , 2012, last 13, 2013, 2014  S/P Tonsillectomy  History of Lt  Nephrectomy  6/10 - left  History of cystoscopy  2015  H/O abdominal aortic aneurysm repair  S/P AAA repair  History of nephrectomy  Left  Presence of stent in LAD coronary artery  History of heart artery stent  DIONICIO      FAMILY HISTORY:  Family history of MI (myocardial infarction) (Father, Mother)  father  74y/o MI, mother  96y/o alzheimers    Family history of early CAD (Father, Mother)      Family history not pertinent as reviewed with the patient.    SOCIAL HISTORY:  Denies any toxic habits    ALLERGIES: NKA penicillin (Rash)  heparin (Other (Severe))  penicillin (Hives)  Cipro (Other)  Levaquin (Other)  heparin (Other)    HOME MEDICATIONS:  amLODIPine 2.5 mg oral tablet: 1 tab(s) orally once a day (2023 10:48)  atorvastatin 80 mg oral tablet: 1 tab(s) orally once a day (at bedtime) (2023 11:01)  metoprolol succinate 100 mg oral tablet, extended release: 1 tab(s) orally once a day (2023 10:48)  Namenda 10 mg oral tablet: 1 tab(s) orally 2 times a day (2023 11:01)  --------------------------------------------------------------------------------------------    PHYSICAL EXAM:   General: Sedated  Neuro: Head dressing in place.   HEENT: Not opening eyes, ETT in place, OG tube in place.   Cardio: RRR  Resp: Intubated  GI/Abd: Distended, not soft, not peritonitic.   --------------------------------------------------------------------------------------------    LABS                 8.7    6.50   )----------(  290       ( 21 Dec 2023 16:20 )               26.0      140    |  105    |  34.1   ----------------------------<  165        ( 21 Dec 2023 12:15 )  4.2     |  18.0   |  1.68     Ca    8.8        ( 21 Dec 2023 12:15 )  Phos  5.8       ( 21 Dec 2023 12:15 )  Mg     1.9       ( 21 Dec 2023 12:15 )    TPro  6.0    /  Alb  3.0    /  TBili  0.4    /  DBili  x      /  AST  16     /  ALT  9      /  AlkPhos  128    ( 21 Dec 2023 12:15 )    LIVER FUNCTIONS - ( 21 Dec 2023 12:15 )  Alb: 3.0 g/dL / Pro: 6.0 g/dL / ALK PHOS: 128 U/L / ALT: 9 U/L / AST: 16 U/L / GGT: x           PT/INR -  13.1 sec / 1.19 ratio   ( 21 Dec 2023 12:15 )       PTT -  27.2 sec   ( 21 Dec 2023 12:15 )  CAPILLARY BLOOD GLUCOSE        Urinalysis Basic - ( 21 Dec 2023 12:15 )    Color: x / Appearance: x / SG: x / pH: x  Gluc: 165 mg/dL / Ketone: x  / Bili: x / Urobili: x   Blood: x / Protein: x / Nitrite: x   Leuk Esterase: x / RBC: x / WBC x   Sq Epi: x / Non Sq Epi: x / Bacteria: x      ABG - ( 21 Dec 2023 15:35 )  pH: 7.320 /  pCO2: 39    /  pO2: 272   / HCO3: 20    / Base Excess: -6.0  /  SaO2: 100.0       12:07 - VBG - pH: 7.200 | pCO2: 57    | pO2: <42   | Lactate: 3.00     --------------------------------------------------------------------------------------------  IMAGING

## 2023-12-21 NOTE — CONSULT NOTE ADULT - SUBJECTIVE AND OBJECTIVE BOX
Patient is a 83y old  Male who presents with a chief complaint of s/p unwitnessed fall with head strike (21 Dec 2023 11:44)      HPI:  82y M with PMH HTN, CAD s/p stents, renal cell carcinoma status post left nephrectomy, bladder CA, BPH, CHF, LBBB, vertigo,  HLD, 5.5cm AAA without rupture post EVAR, paroxysmal A-fib on Eliquis, Plavix, and aspirin, early stage Alzheimer dementia transferred from [facility] s/p unwitnessed fall with head strike at home found by wife on floor at 8am. Patient brought to urgent care by wife and had 5 staples to posterior scalp but was instructed to go to nearest ED.  CT head at [place] showed R frontal IPH, SDH, SAH at 9:00. CTA stroke protocol not performed at [facility]. Patient BIB on 6L NC.  Pt GCS 15 on arrival, A&Ox2 on arrival. After initial assessment, patient noted to be vomiting enroute to CT scanner.  GI called for Coffee ground NGT aspirate. Pt. presently intubated with coffee ground NGT aspirate. Daughter at bedside. No prior h/o ulcer disease or GI bleeding. Pt unable to provide history as he is presently intubated and sedated.         (10 Nov 2023 11:04)      REVIEW OF SYSTEMS:  Unobtainable in this pt.    PAST MEDICAL & SURGICAL HISTORY:  HTN  dx       Bladder Cancer (ICD9 188.9)  TCC, dx       Hyperlipemia (ICD9 272.4)  dx       Lt Renal Neoplasm  left - s/p left nephrectomy      Hx antineoplastic chemotherapy (BCG )      Left bundle branch block      Vertigo      Heel spur, left      Abdominal aortic aneurysm (AAA)  5.5 cm      BPH (benign prostatic hyperplasia)      Renal mass, right  current - following with Dr Pamela SHEARER (coronary artery disease)      Bladder cancer, primary, with metastasis from bladder to other site  Left kidney      Acute renal failure, unspecified acute renal failure type      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)      Abdominal aortic aneurysm (AAA) without rupture      Congestive heart failure, unspecified chronicity, unspecified heart failure type      Hypertension      History of abdominal aortic aneurysm (AAA)      CAD (coronary artery disease)      Alzheimers disease      urethral Stent 2008  stent placement and removal      S/P Cystoscopy  bladder polyps removal multiple times (since ), 6/10 , 10/2011 & 10/17/2011, , 2012, last 13, 2013, 2014      S/P Tonsillectomy      History of Lt  Nephrectomy  6/10 - left      History of cystoscopy  2015      H/O abdominal aortic aneurysm repair      S/P AAA repair      History of nephrectomy  Left      Presence of stent in LAD coronary artery      History of heart artery stent  DIONICIO          FAMILY HISTORY:  Family history of MI (myocardial infarction) (Father, Mother)  father  76y/o MI, mother  94y/o alzheimers    Family history of early CAD (Father, Mother)        SOCIAL HISTORY:  Smoking Status: [ ] Current, [x ] Former, [ ] Never  Pack Years: N/A. No ETOH or drug abuse history.    MEDICATIONS:  MEDICATIONS  (STANDING):  acetaminophen   IVPB .. 1000 milliGRAM(s) IV Intermittent once  atorvastatin 80 milliGRAM(s) Oral at bedtime  chlorhexidine 2% Cloths 1 Application(s) Topical <User Schedule>  dexMEDEtomidine Infusion 0.5 MICROgram(s)/kG/Hr (9.1 mL/Hr) IV Continuous <Continuous>  doxazosin 2 milliGRAM(s) Oral at bedtime  fentaNYL   Infusion. 0.5 MICROgram(s)/kG/Hr (3.64 mL/Hr) IV Continuous <Continuous>  folic acid 1 milliGRAM(s) Oral daily  hydrocortisone sodium succinate Injectable 50 milliGRAM(s) IV Push every 6 hours  levETIRAcetam  IVPB 500 milliGRAM(s) IV Intermittent every 12 hours  memantine 5 milliGRAM(s) Oral two times a day  meropenem Injectable 1000 milliGRAM(s) IV Push every 8 hours  Nephro-roma 1 Tablet(s) Oral daily  norepinephrine Infusion 0.7 MICROgram(s)/kG/Min (47.7 mL/Hr) IV Continuous <Continuous>  pantoprazole Infusion 8 mG/Hr (10 mL/Hr) IV Continuous <Continuous>  polyethylene glycol 3350 17 Gram(s) Oral daily  propofol Infusion 10 MICROgram(s)/kG/Min (4.37 mL/Hr) IV Continuous <Continuous>  senna 2 Tablet(s) Oral at bedtime  vasopressin Infusion 0.04 Unit(s)/Min (6 mL/Hr) IV Continuous <Continuous>    MEDICATIONS  (PRN):  acetaminophen     Tablet .. 650 milliGRAM(s) Oral every 6 hours PRN Temp greater or equal to 38C (100.4F), Mild Pain (1 - 3)  artificial tears (preservative free) Ophthalmic Solution 1 Drop(s) Both EYES every 6 hours PRN Dry Eyes      Allergies    penicillin (Rash)  heparin (Other (Severe))  penicillin (Hives)  Cipro (Other)  Levaquin (Other)  heparin (Other)    Intolerances        Vital Signs Last 24 Hrs  T(C): 38.3 (21 Dec 2023 14:00), Max: 38.4 (21 Dec 2023 13:00)  T(F): 100.9 (21 Dec 2023 14:00), Max: 101.1 (21 Dec 2023 13:00)  HR: 84 (21 Dec 2023 14:00) (80 - 127)  BP: 119/41 (21 Dec 2023 13:15) (68/47 - 145/65)  BP(mean): 63 (21 Dec 2023 13:15) (55 - 105)  RR: 26 (21 Dec 2023 14:00) (18 - 42)  SpO2: 100% (21 Dec 2023 14:00) (85% - 100%)    Parameters below as of 21 Dec 2023 13:00  Patient On (Oxygen Delivery Method): ventilator    O2 Concentration (%): 100    12-21 @ 07:01  -  12-21 @ 14:22  --------------------------------------------------------  IN: 1598 mL / OUT: 265 mL / NET: 1333 mL      Mode: AC/ CMV (Assist Control/ Continuous Mandatory Ventilation)  RR (machine): 12  TV (machine): 450  FiO2: 60  PEEP: 8  MAP: 13  PIP: 30      PHYSICAL EXAM:    General: Well developed; intubated. sedated.  HEENT: MMM, conjunctiva pink and sclera anicteric.  Lungs: Clear bilaterally.  Cor: RRR S1, S2 only.  Gastrointestinal: Abdomen:  Soft, non-tender non-distended; Normal bowel sounds; No rebound or guarding or HSM.  KIARA: Not done.  Extremities: Normal range of motion, No clubbing, cyanosis or edema  Neurological: Alert and oriented x3  Skin: Warm and dry. No obvious rash      LABS:                        9.8    3.58  )-----------( 338      ( 21 Dec 2023 12:15 )             29.5     12-    140  |  105  |  34.1<H>  ----------------------------<  165<H>  4.2   |  18.0<L>  |  1.68<H>    Ca    8.8      21 Dec 2023 12:15  Phos  5.8       Mg     1.9         TPro  6.0<L>  /  Alb  3.0<L>  /  TBili  0.4  /  DBili  x   /  AST  16  /  ALT  9   /  AlkPhos  128<H>            RADIOLOGY & ADDITIONAL STUDIES:   < from: CT Head No Cont (23 @ 08:24) >  ACC: [id number] EXAM:  CT BRAIN   ORDERED BY: RASHIDA CASTAÑEDA     *** ADDENDUM # 1 ***    Extracalvarial collection is again seen in the postoperative region. This   is compatible with a pseudomeningocele. This finding measures   approximately 1.3 cm widest diameter and previously measured   approximately 1.1 cm in widest diameter.    --- End of Report ---    *** END OF ADDENDUM # 1 ***      PROCEDURE DATE:  2023          INTERPRETATION:  Clinical indication: Follow-up bleed.    Multiple axial sections performed from the base of skull to vertex   without contrast enhancement. Coronal and sagittal reconstructions were   performed.    This exam is compared prior head CT performed on     Postoperative changes compatible the right temporal frontal parietal   cranioplasty is again seen when compared with the prior head CT.    Acute on chronic subdural hematoma involving the right temporal frontal   an parietal region is again identified. This finding demonstrates mild   decrease attenuation which is likely compatible with old expected   evolutionary changes. This finding measures approximately 0.8 cm widest   diameter and previously measured approximately 0.7 cm widest diameter.    Abnormal low-attenuation involving the right posterior frontal cortical   subcortical region is again seen with some associated areas of   hemorrhage. This appears unchanged when compared with the prior exam.    Acute parenchymal hemorrhage involving the right frontal region is again   seen. The largest of these areas of parenchymal hemorrhage measures   approximately 2.4 x 2.0 cm and previously measured approximately 2.1 x   1.7 cm. The size and configuration ventricles appear unchanged.   Intraventricular hemorrhage is again seen.    Parenchymal volume loss and chronic microvascular ischemic changes are   identified    The visualized paranasal sinuses mastoid and middle ear regions appear   clear.    Orotracheal and orogastric tubes are seen.    IMPRESSION: Areas of hemorrhage again seen. Previously noted acute on   chronic subdural hematoma as well as right frontal parenchymal hemorrhage   appears to have minimally increased in size.    --- End of Report ---    ***Please see the addendum at the top of this report. It maycontain   additional important information or changes.****        ABHISHEK BLANCO MD; Attending Radiologist  This document has been electronically signed. Dec 21 2023  8:36AM  1st Addendum: ABHISHEK BLANCO MD; Attending Radiologist  The first addendum was electronically signed on: Dec 21 2023  8:43AM.    < end of copied text >  < from: CT Head No Cont (23 @ 08:24) >  ACC: [id number] EXAM:  CT BRAIN   ORDERED BY: RASHIDA CASTAÑEDA     *** ADDENDUM # 1 ***    Extracalvarial collection is again seen in the postoperative region. This   is compatible with a pseudomeningocele. This finding measures   approximately 1.3 cm widest diameter and previously measured   approximately 1.1 cm in widest diameter.    --- End of Report ---    *** END OF ADDENDUM # 1 ***      PROCEDURE DATE:  2023          INTERPRETATION:  Clinical indication: Follow-up bleed.    Multiple axial sections performed from the base of skull to vertex   without contrast enhancement. Coronal and sagittal reconstructions were   performed.    This exam is compared prior head CT performed on     Postoperative changes compatible the right temporal frontal parietal   cranioplasty is again seen when compared with the prior head CT.    Acute on chronic subdural hematoma involving the right temporal frontal   an parietal region is again identified. This finding demonstrates mild   decrease attenuation which is likely compatible with old expected   evolutionary changes. This finding measures approximately 0.8 cm widest   diameter and previously measured approximately 0.7 cm widest diameter.    Abnormal low-attenuation involving the right posterior frontal cortical   subcortical region is again seen with some associated areas of   hemorrhage. This appears unchanged when compared with the prior exam.    Acute parenchymal hemorrhage involving the right frontal region is again   seen. The largest of these areas of parenchymal hemorrhage measures   approximately 2.4 x 2.0 cm and previously measured approximately 2.1 x   1.7 cm. The size and configuration ventricles appear unchanged.   Intraventricular hemorrhage is again seen.    Parenchymal volume loss and chronic microvascular ischemic changes are   identified    The visualized paranasal sinuses mastoid and middle ear regions appear   clear.    Orotracheal and orogastric tubes are seen.    IMPRESSION: Areas of hemorrhage again seen. Previously noted acute on   chronic subdural hematoma as well as right frontal parenchymal hemorrhage   appears to have minimally increased in size.    --- End of Report ---    ***Please see the addendum at the top of this report. It maycontain   additional important information or changes.****        ABHISHEK BLANCO MD; Attending Radiologist  This document has been electronically signed. Dec 21 2023  8:36AM  1st Addendum: ABHISHEK BLANCO MD; Attending Radiologist  The first addendum was electronically signed on: Dec 21 2023  8:43AM.    < end of copied text >   Patient is a 83y old  Male who presents with a chief complaint of s/p unwitnessed fall with head strike (21 Dec 2023 11:44)      HPI:  82y M with PMH HTN, CAD s/p stents, renal cell carcinoma status post left nephrectomy, bladder CA, BPH, CHF, LBBB, vertigo,  HLD, 5.5cm AAA without rupture post EVAR, paroxysmal A-fib on Eliquis, Plavix, and aspirin, early stage Alzheimer dementia transferred from [facility] s/p unwitnessed fall with head strike at home found by wife on floor at 8am. Patient brought to urgent care by wife and had 5 staples to posterior scalp but was instructed to go to nearest ED.  CT head at [place] showed R frontal IPH, SDH, SAH at 9:00. CTA stroke protocol not performed at [facility]. Patient BIB on 6L NC.  Pt GCS 15 on arrival, A&Ox2 on arrival. After initial assessment, patient noted to be vomiting enroute to CT scanner.  GI called for Coffee ground NGT aspirate. Pt. presently intubated with coffee ground NGT aspirate. Daughter at bedside. No prior h/o ulcer disease or GI bleeding. Pt unable to provide history as he is presently intubated and sedated.         (10 Nov 2023 11:04)      REVIEW OF SYSTEMS:  Unobtainable in this pt.    PAST MEDICAL & SURGICAL HISTORY:  HTN  dx       Bladder Cancer (ICD9 188.9)  TCC, dx       Hyperlipemia (ICD9 272.4)  dx       Lt Renal Neoplasm  left - s/p left nephrectomy      Hx antineoplastic chemotherapy (BCG )      Left bundle branch block      Vertigo      Heel spur, left      Abdominal aortic aneurysm (AAA)  5.5 cm      BPH (benign prostatic hyperplasia)      Renal mass, right  current - following with Dr Pamela SHEARER (coronary artery disease)      Bladder cancer, primary, with metastasis from bladder to other site  Left kidney      Acute renal failure, unspecified acute renal failure type      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)      Abdominal aortic aneurysm (AAA) without rupture      Congestive heart failure, unspecified chronicity, unspecified heart failure type      Hypertension      History of abdominal aortic aneurysm (AAA)      CAD (coronary artery disease)      Alzheimers disease      urethral Stent 2008  stent placement and removal      S/P Cystoscopy  bladder polyps removal multiple times (since ), 6/10 , 10/2011 & 10/17/2011, , 2012, last 13, 2013, 2014      S/P Tonsillectomy      History of Lt  Nephrectomy  6/10 - left      History of cystoscopy  2015      H/O abdominal aortic aneurysm repair      S/P AAA repair      History of nephrectomy  Left      Presence of stent in LAD coronary artery      History of heart artery stent  DIONICIO          FAMILY HISTORY:  Family history of MI (myocardial infarction) (Father, Mother)  father  74y/o MI, mother  96y/o alzheimers    Family history of early CAD (Father, Mother)        SOCIAL HISTORY:  Smoking Status: [ ] Current, [x ] Former, [ ] Never  Pack Years: N/A. No ETOH or drug abuse history.    MEDICATIONS:  MEDICATIONS  (STANDING):  acetaminophen   IVPB .. 1000 milliGRAM(s) IV Intermittent once  atorvastatin 80 milliGRAM(s) Oral at bedtime  chlorhexidine 2% Cloths 1 Application(s) Topical <User Schedule>  dexMEDEtomidine Infusion 0.5 MICROgram(s)/kG/Hr (9.1 mL/Hr) IV Continuous <Continuous>  doxazosin 2 milliGRAM(s) Oral at bedtime  fentaNYL   Infusion. 0.5 MICROgram(s)/kG/Hr (3.64 mL/Hr) IV Continuous <Continuous>  folic acid 1 milliGRAM(s) Oral daily  hydrocortisone sodium succinate Injectable 50 milliGRAM(s) IV Push every 6 hours  levETIRAcetam  IVPB 500 milliGRAM(s) IV Intermittent every 12 hours  memantine 5 milliGRAM(s) Oral two times a day  meropenem Injectable 1000 milliGRAM(s) IV Push every 8 hours  Nephro-roma 1 Tablet(s) Oral daily  norepinephrine Infusion 0.7 MICROgram(s)/kG/Min (47.7 mL/Hr) IV Continuous <Continuous>  pantoprazole Infusion 8 mG/Hr (10 mL/Hr) IV Continuous <Continuous>  polyethylene glycol 3350 17 Gram(s) Oral daily  propofol Infusion 10 MICROgram(s)/kG/Min (4.37 mL/Hr) IV Continuous <Continuous>  senna 2 Tablet(s) Oral at bedtime  vasopressin Infusion 0.04 Unit(s)/Min (6 mL/Hr) IV Continuous <Continuous>    MEDICATIONS  (PRN):  acetaminophen     Tablet .. 650 milliGRAM(s) Oral every 6 hours PRN Temp greater or equal to 38C (100.4F), Mild Pain (1 - 3)  artificial tears (preservative free) Ophthalmic Solution 1 Drop(s) Both EYES every 6 hours PRN Dry Eyes      Allergies    penicillin (Rash)  heparin (Other (Severe))  penicillin (Hives)  Cipro (Other)  Levaquin (Other)  heparin (Other)    Intolerances        Vital Signs Last 24 Hrs  T(C): 38.3 (21 Dec 2023 14:00), Max: 38.4 (21 Dec 2023 13:00)  T(F): 100.9 (21 Dec 2023 14:00), Max: 101.1 (21 Dec 2023 13:00)  HR: 84 (21 Dec 2023 14:00) (80 - 127)  BP: 119/41 (21 Dec 2023 13:15) (68/47 - 145/65)  BP(mean): 63 (21 Dec 2023 13:15) (55 - 105)  RR: 26 (21 Dec 2023 14:00) (18 - 42)  SpO2: 100% (21 Dec 2023 14:00) (85% - 100%)    Parameters below as of 21 Dec 2023 13:00  Patient On (Oxygen Delivery Method): ventilator    O2 Concentration (%): 100    12-21 @ 07:01  -  12-21 @ 14:22  --------------------------------------------------------  IN: 1598 mL / OUT: 265 mL / NET: 1333 mL      Mode: AC/ CMV (Assist Control/ Continuous Mandatory Ventilation)  RR (machine): 12  TV (machine): 450  FiO2: 60  PEEP: 8  MAP: 13  PIP: 30      PHYSICAL EXAM:    General: Well developed; intubated. sedated.  HEENT: MMM, conjunctiva pink and sclera anicteric.  Lungs: Clear bilaterally.  Cor: RRR S1, S2 only.  Gastrointestinal: Abdomen:  Soft, non-tender non-distended; Normal bowel sounds; No rebound or guarding or HSM.  KIARA: Not done.  Extremities: Normal range of motion, No clubbing, cyanosis or edema  Neurological: Alert and oriented x3  Skin: Warm and dry. No obvious rash      LABS:                        9.8    3.58  )-----------( 338      ( 21 Dec 2023 12:15 )             29.5     12-    140  |  105  |  34.1<H>  ----------------------------<  165<H>  4.2   |  18.0<L>  |  1.68<H>    Ca    8.8      21 Dec 2023 12:15  Phos  5.8       Mg     1.9         TPro  6.0<L>  /  Alb  3.0<L>  /  TBili  0.4  /  DBili  x   /  AST  16  /  ALT  9   /  AlkPhos  128<H>            RADIOLOGY & ADDITIONAL STUDIES:   < from: CT Head No Cont (23 @ 08:24) >  ACC: [id number] EXAM:  CT BRAIN   ORDERED BY: RASHIDA CASTAÑEDA     *** ADDENDUM # 1 ***    Extracalvarial collection is again seen in the postoperative region. This   is compatible with a pseudomeningocele. This finding measures   approximately 1.3 cm widest diameter and previously measured   approximately 1.1 cm in widest diameter.    --- End of Report ---    *** END OF ADDENDUM # 1 ***      PROCEDURE DATE:  2023          INTERPRETATION:  Clinical indication: Follow-up bleed.    Multiple axial sections performed from the base of skull to vertex   without contrast enhancement. Coronal and sagittal reconstructions were   performed.    This exam is compared prior head CT performed on     Postoperative changes compatible the right temporal frontal parietal   cranioplasty is again seen when compared with the prior head CT.    Acute on chronic subdural hematoma involving the right temporal frontal   an parietal region is again identified. This finding demonstrates mild   decrease attenuation which is likely compatible with old expected   evolutionary changes. This finding measures approximately 0.8 cm widest   diameter and previously measured approximately 0.7 cm widest diameter.    Abnormal low-attenuation involving the right posterior frontal cortical   subcortical region is again seen with some associated areas of   hemorrhage. This appears unchanged when compared with the prior exam.    Acute parenchymal hemorrhage involving the right frontal region is again   seen. The largest of these areas of parenchymal hemorrhage measures   approximately 2.4 x 2.0 cm and previously measured approximately 2.1 x   1.7 cm. The size and configuration ventricles appear unchanged.   Intraventricular hemorrhage is again seen.    Parenchymal volume loss and chronic microvascular ischemic changes are   identified    The visualized paranasal sinuses mastoid and middle ear regions appear   clear.    Orotracheal and orogastric tubes are seen.    IMPRESSION: Areas of hemorrhage again seen. Previously noted acute on   chronic subdural hematoma as well as right frontal parenchymal hemorrhage   appears to have minimally increased in size.    --- End of Report ---    ***Please see the addendum at the top of this report. It maycontain   additional important information or changes.****        ABHISHEK BLANCO MD; Attending Radiologist  This document has been electronically signed. Dec 21 2023  8:36AM  1st Addendum: ABHISHEK BLANCO MD; Attending Radiologist  The first addendum was electronically signed on: Dec 21 2023  8:43AM.    < end of copied text >  < from: CT Head No Cont (23 @ 08:24) >  ACC: [id number] EXAM:  CT BRAIN   ORDERED BY: RASHIDA CASTAÑEDA     *** ADDENDUM # 1 ***    Extracalvarial collection is again seen in the postoperative region. This   is compatible with a pseudomeningocele. This finding measures   approximately 1.3 cm widest diameter and previously measured   approximately 1.1 cm in widest diameter.    --- End of Report ---    *** END OF ADDENDUM # 1 ***      PROCEDURE DATE:  2023          INTERPRETATION:  Clinical indication: Follow-up bleed.    Multiple axial sections performed from the base of skull to vertex   without contrast enhancement. Coronal and sagittal reconstructions were   performed.    This exam is compared prior head CT performed on     Postoperative changes compatible the right temporal frontal parietal   cranioplasty is again seen when compared with the prior head CT.    Acute on chronic subdural hematoma involving the right temporal frontal   an parietal region is again identified. This finding demonstrates mild   decrease attenuation which is likely compatible with old expected   evolutionary changes. This finding measures approximately 0.8 cm widest   diameter and previously measured approximately 0.7 cm widest diameter.    Abnormal low-attenuation involving the right posterior frontal cortical   subcortical region is again seen with some associated areas of   hemorrhage. This appears unchanged when compared with the prior exam.    Acute parenchymal hemorrhage involving the right frontal region is again   seen. The largest of these areas of parenchymal hemorrhage measures   approximately 2.4 x 2.0 cm and previously measured approximately 2.1 x   1.7 cm. The size and configuration ventricles appear unchanged.   Intraventricular hemorrhage is again seen.    Parenchymal volume loss and chronic microvascular ischemic changes are   identified    The visualized paranasal sinuses mastoid and middle ear regions appear   clear.    Orotracheal and orogastric tubes are seen.    IMPRESSION: Areas of hemorrhage again seen. Previously noted acute on   chronic subdural hematoma as well as right frontal parenchymal hemorrhage   appears to have minimally increased in size.    --- End of Report ---    ***Please see the addendum at the top of this report. It maycontain   additional important information or changes.****        ABHISHEK BLANCO MD; Attending Radiologist  This document has been electronically signed. Dec 21 2023  8:36AM  1st Addendum: ABHISHEK BLANCO MD; Attending Radiologist  The first addendum was electronically signed on: Dec 21 2023  8:43AM.    < end of copied text >

## 2023-12-21 NOTE — CONSULT NOTE ADULT - ATTENDING COMMENTS
Patient seen and examined in the ICU. abd soft, mild distention, no rebound or guarding. patient not responding to pain per bedside nurse, even when sedation held. Imaging reviewed, bowel distended from large fecal impaction, no sign of pneumatosis or bowel wall thickening, vessels patient. No concern for mesenteric ischemia at this time. Surgery will follow. discussed GOC with patient's wife and daughters, they are not certain they would consent for surgical intervention, awaiting to see what his neurologic status is. discussed with MICU team.

## 2023-12-21 NOTE — CONSULT NOTE ADULT - ASSESSMENT
83M PMH HTN, CAD s/p PCO, RCC s/p left nephrectomy, bladder Ca, BPH, CHF, Vertigo, HLD, AAA s/p EVAR, parox Afib, h/o HIT, Alzheimer transferred s/p fall with Rt frontal IPH. 83M PMH HTN, CAD s/p PCO, RCC s/p left nephrectomy, bladder Ca, BPH, CHF, Vertigo, HLD, AAA s/p EVAR, parox Afib, h/o HIT, Alzheimer transferred s/p fall with Rt frontal IPH s/p R hemicraniectomy 11/10/23, course c/b AFib with RVR, Klebsiella PNA, LUE DVT, aortic valve echodensity (family refused WHIT), R frontal IPH worsened and was given Andexxa for reveral of Eliquis, downgraded to medicine, and on 12/21/23 RRT called for unresponsiveness, aspiration episode, intubated, transferred to ICU in shock state requiring pressors       83M PMH HTN, CAD s/p PCO, RCC s/p left nephrectomy, bladder Ca, BPH, CHF, Vertigo, HLD, AAA s/p EVAR, parox Afib, h/o HIT, Alzheimer transferred s/p fall with Rt frontal IPH s/p R hemicraniectomy 11/10/23, course c/b AFib with RVR, Klebsiella PNA, LUE DVT, aortic valve echodensity (family refused WHIT), R frontal IPH worsened and was given Andexxa for reveral of Eliquis, downgraded to medicine, and on 12/21/23 RRT called for unresponsiveness, aspiration episode, intubated, transferred to ICU in shock state requiring pressors           83M PMH HTN, CAD s/p PCO, RCC s/p left nephrectomy, bladder Ca, BPH, CHF, Vertigo, HLD, AAA s/p EVAR, parox Afib, h/o HIT, Alzheimer transferred s/p fall with Rt frontal IPH s/p R hemicraniectomy 11/10/23, course c/b AFib with RVR, Klebsiella PNA, LUE DVT, aortic valve echodensity (family refused WHIT), R frontal IPH worsened and was given Andexxa for reveral of Eliquis, downgraded to medicine, and on 12/21/23 RRT called for unresponsiveness, aspiration episode, intubated, transferred to ICU in shock state requiring pressors    AMS - likely 2/2 sepsis, shock  Aspiration PNA - previously grew Klebsiella  Hypotension, shock  - Vanc/Meropenem  - Repeating sputum cultures  - F/u BCx that were drawn 12/20/23  - On Levophed, titrate MAP >65  - Placing CVC + A-lines  - Stress dose steroids Hydrocortisone 50mg IV Q6H  - Held antihypertensive agents    Possible GIB - noted during intubation  - No A/C  - Starting Protonix gtt  - Will consult GI  - NPO    S/p intubation  - Full vent support  - Monitor Paw/Pplat  - Sedation to vent synchrony - is on Fent + Precedex, adding Propofol    R frontal IPH  S/p R hemicraniectomy 11/10/23  S/p cranioplasty 12/7, removal of subgaleal drain 12/10  - Repeat CTH without acute changes  - F/u with neurology/NSICU  - Sedation as above    Aortic valve echodensity  - F/u serial BCx, last drawn 12/20  - Family refused WHIT  - ABx as above - Vanc/Jaylen    UTI  - ABx as above    Diet - NPO  PPx - PPI gtt as above  Lines/Tubes - R femoral A-line + CVC placed 12/21  Code Status - Full; will contact family  Dispo - Admit to ICU      Jamari Flores M.D.  , Pulmonary & Critical Care Medicine  [facility]  Pulmonary and Sleep Medicine at [place]  [address]  [place] [address]  T: [phone]  F: [phone]       83M PMH HTN, CAD s/p PCO, RCC s/p left nephrectomy, bladder Ca, BPH, CHF, Vertigo, HLD, AAA s/p EVAR, parox Afib, h/o HIT, Alzheimer transferred s/p fall with Rt frontal IPH s/p R hemicraniectomy 11/10/23, course c/b AFib with RVR, Klebsiella PNA, LUE DVT, aortic valve echodensity (family refused WHIT), R frontal IPH worsened and was given Andexxa for reveral of Eliquis, downgraded to medicine, and on 12/21/23 RRT called for unresponsiveness, aspiration episode, intubated, transferred to ICU in shock state requiring pressors    AMS - likely 2/2 sepsis, shock  Aspiration PNA - previously grew Klebsiella  Hypotension, shock  - Vanc/Meropenem  - Repeating sputum cultures  - F/u BCx that were drawn 12/20/23  - On Levophed, titrate MAP >65  - Placing CVC + A-lines  - Stress dose steroids Hydrocortisone 50mg IV Q6H  - Held antihypertensive agents    Possible GIB - noted during intubation  - No A/C  - Starting Protonix gtt  - Will consult GI  - NPO    S/p intubation  - Full vent support  - Monitor Paw/Pplat  - Sedation to vent synchrony - is on Fent + Precedex, adding Propofol    R frontal IPH  S/p R hemicraniectomy 11/10/23  S/p cranioplasty 12/7, removal of subgaleal drain 12/10  - Repeat CTH without acute changes  - F/u with neurology/NSICU  - Sedation as above    Aortic valve echodensity  - F/u serial BCx, last drawn 12/20  - Family refused WHIT  - ABx as above - Vanc/Jaylen    UTI  - ABx as above    Diet - NPO  PPx - PPI gtt as above  Lines/Tubes - R femoral A-line + CVC placed 12/21  Code Status - Full; will contact family  Dispo - Admit to ICU      Jamari Flores M.D.  , Pulmonary & Critical Care Medicine  [facility]  Pulmonary and Sleep Medicine at [place]  [address]  [place] [address]  T: [phone]  F: [phone]       83M PMH HTN, CAD s/p PCO, RCC s/p left nephrectomy, bladder Ca, BPH, CHF, Vertigo, HLD, AAA s/p EVAR, parox Afib, h/o HIT, Alzheimer transferred s/p fall with Rt frontal IPH s/p R hemicraniectomy 11/10/23, course c/b AFib with RVR, Klebsiella PNA, LUE DVT, aortic valve echodensity (family refused WHIT), R frontal IPH worsened and was given Andexxa for reveral of Eliquis, downgraded to medicine, and on 12/21/23 RRT called for unresponsiveness, aspiration episode, intubated, transferred to ICU in shock state requiring pressors    AMS - likely 2/2 sepsis, shock  Aspiration PNA - previously grew Klebsiella  Hypotension, shock  - Vanc/Meropenem  - Repeating sputum cultures  - F/u BCx that were drawn 12/20/23  - On Levophed, titrate MAP >65  - Placing CVC + A-lines  - Stress dose steroids Hydrocortisone 50mg IV Q6H  - Held antihypertensive agents    AMS  - Possibly 2/2 toxic-metabolic encephalopathy in setting of septic shock  - Checking cvEEG r/o seizures  - If does not clinically improve will need to consider LP    Possible GIB - noted during intubation  - No A/C  - Starting Protonix gtt  - Will consult GI  - NPO    S/p intubation  - Full vent support  - Monitor Paw/Pplat  - Sedation to vent synchrony - is on Fent + Precedex, adding Propofol    R frontal IPH  S/p R hemicraniectomy 11/10/23  S/p cranioplasty 12/7, removal of subgaleal drain 12/10  - Repeat CTH without acute changes  - F/u with neurology/NSICU  - Sedation as above    Aortic valve echodensity  - F/u serial BCx, last drawn 12/20  - Family refused WHIT  - ABx as above - Vanc/Jaylen    UTI  - ABx as above    Diet - NPO  PPx - PPI gtt as above  Lines/Tubes - R femoral A-line + CVC placed 12/21  Code Status - Full; will contact family  Dispo - Admit to ICU      Jamari Flores M.D.  , Pulmonary & Critical Care Medicine  [facility]  Pulmonary and Sleep Medicine at [place]  [address]  [place] [address]  T: [phone]  F: [phone]       83M PMH HTN, CAD s/p PCO, RCC s/p left nephrectomy, bladder Ca, BPH, CHF, Vertigo, HLD, AAA s/p EVAR, parox Afib, h/o HIT, Alzheimer transferred s/p fall with Rt frontal IPH s/p R hemicraniectomy 11/10/23, course c/b AFib with RVR, Klebsiella PNA, LUE DVT, aortic valve echodensity (family refused WHIT), R frontal IPH worsened and was given Andexxa for reveral of Eliquis, downgraded to medicine, and on 12/21/23 RRT called for unresponsiveness, aspiration episode, intubated, transferred to ICU in shock state requiring pressors    AMS - likely 2/2 sepsis, shock  Aspiration PNA - previously grew Klebsiella  Hypotension, shock  - Vanc/Meropenem  - Repeating sputum cultures  - F/u BCx that were drawn 12/20/23  - On Levophed, titrate MAP >65  - Placing CVC + A-lines  - Stress dose steroids Hydrocortisone 50mg IV Q6H  - Held antihypertensive agents    AMS  - Possibly 2/2 toxic-metabolic encephalopathy in setting of septic shock  - Checking cvEEG r/o seizures  - If does not clinically improve will need to consider LP    Possible GIB - noted during intubation  - No A/C  - Starting Protonix gtt  - Will consult GI  - NPO    S/p intubation  - Full vent support  - Monitor Paw/Pplat  - Sedation to vent synchrony - is on Fent + Precedex, adding Propofol    R frontal IPH  S/p R hemicraniectomy 11/10/23  S/p cranioplasty 12/7, removal of subgaleal drain 12/10  - Repeat CTH without acute changes  - F/u with neurology/NSICU  - Sedation as above    Aortic valve echodensity  - F/u serial BCx, last drawn 12/20  - Family refused WHIT  - ABx as above - Vanc/Jaylen    UTI  - ABx as above    Diet - NPO  PPx - PPI gtt as above  Lines/Tubes - R femoral A-line + CVC placed 12/21  Code Status - Full; will contact family  Dispo - Admit to ICU      Jamari Flores M.D.  , Pulmonary & Critical Care Medicine  [facility]  Pulmonary and Sleep Medicine at [place]  [address]  [place] [address]  T: [phone]  F: [phone]       83M PMH HTN, CAD s/p PCO, RCC s/p left nephrectomy, bladder Ca, BPH, CHF, Vertigo, HLD, AAA s/p EVAR, parox Afib, h/o HIT, Alzheimer transferred s/p fall with Rt frontal IPH s/p R hemicraniectomy 11/10/23, course c/b AFib with RVR, Klebsiella PNA, LUE DVT, aortic valve echodensity (family refused WHIT), R frontal IPH worsened and was given Andexxa for reveral of Eliquis, downgraded to medicine, and on 12/21/23 RRT called for unresponsiveness, aspiration episode, intubated, transferred to ICU in shock state requiring pressors    AMS - likely 2/2 sepsis, shock  Aspiration PNA - previously grew Klebsiella  Hypotension, shock  - Vanc/Meropenem  - Repeating sputum cultures  - F/u BCx that were drawn 12/20/23  - On Levophed, titrate MAP >65  - Placing CVC + A-lines  - Stress dose steroids Hydrocortisone 50mg IV Q6H  - Held antihypertensive agents    Distended abdomen  - In setting of aortic valve vegetation, concern for acute ischemia  - Trend lactate  - Serial abdominal exams  - Surgery consulted  - Pending CT abdomen/pelvis with IV contrast    AMS  - Possibly 2/2 toxic-metabolic encephalopathy in setting of septic shock  - Checking cvEEG r/o seizures  - If does not clinically improve will need to consider LP    Possible GIB - noted during intubation  - No A/C  - Starting Protonix gtt  - Will consult GI  - NPO    S/p intubation  - Full vent support  - Monitor Paw/Pplat  - Sedation to vent synchrony - is on Fent + Precedex, adding Propofol    R frontal IPH  S/p R hemicraniectomy 11/10/23  S/p cranioplasty 12/7, removal of subgaleal drain 12/10  - Repeat CTH without acute changes  - F/u with neurology/NSICU  - Sedation as above    Aortic valve echodensity  - F/u serial BCx, last drawn 12/20  - Family refused WHIT  - ABx as above - Vanc/Jaylen    UTI  - ABx as above    Diet - NPO  PPx - PPI gtt as above  Lines/Tubes - R femoral A-line + CVC placed 12/21  Code Status - Full; will contact family  Dispo - Admit to ICU      Jamari Flores M.D.  , Pulmonary & Critical Care Medicine  [facility]  Pulmonary and Sleep Medicine at [place]  [address]  [place] [address]  T: [phone]  F: [phone]       83M PMH HTN, CAD s/p PCO, RCC s/p left nephrectomy, bladder Ca, BPH, CHF, Vertigo, HLD, AAA s/p EVAR, parox Afib, h/o HIT, Alzheimer transferred s/p fall with Rt frontal IPH s/p R hemicraniectomy 11/10/23, course c/b AFib with RVR, Klebsiella PNA, LUE DVT, aortic valve echodensity (family refused WHIT), R frontal IPH worsened and was given Andexxa for reveral of Eliquis, downgraded to medicine, and on 12/21/23 RRT called for unresponsiveness, aspiration episode, intubated, transferred to ICU in shock state requiring pressors    AMS - likely 2/2 sepsis, shock  Aspiration PNA - previously grew Klebsiella  Hypotension, shock  - Vanc/Meropenem  - Repeating sputum cultures  - F/u BCx that were drawn 12/20/23  - On Levophed, titrate MAP >65  - Placing CVC + A-lines  - Stress dose steroids Hydrocortisone 50mg IV Q6H  - Held antihypertensive agents    Distended abdomen  - In setting of aortic valve vegetation, concern for acute ischemia  - Trend lactate  - Serial abdominal exams  - Surgery consulted  - Pending CT abdomen/pelvis with IV contrast    AMS  - Possibly 2/2 toxic-metabolic encephalopathy in setting of septic shock  - Checking cvEEG r/o seizures  - If does not clinically improve will need to consider LP    Possible GIB - noted during intubation  - No A/C  - Starting Protonix gtt  - Will consult GI  - NPO    S/p intubation  - Full vent support  - Monitor Paw/Pplat  - Sedation to vent synchrony - is on Fent + Precedex, adding Propofol    R frontal IPH  S/p R hemicraniectomy 11/10/23  S/p cranioplasty 12/7, removal of subgaleal drain 12/10  - Repeat CTH without acute changes  - F/u with neurology/NSICU  - Sedation as above    Aortic valve echodensity  - F/u serial BCx, last drawn 12/20  - Family refused WHIT  - ABx as above - Vanc/Jaylen    UTI  - ABx as above    Diet - NPO  PPx - PPI gtt as above  Lines/Tubes - R femoral A-line + CVC placed 12/21  Code Status - Full; will contact family  Dispo - Admit to ICU      Jamari Flores M.D.  , Pulmonary & Critical Care Medicine  [facility]  Pulmonary and Sleep Medicine at [place]  [address]  [place] [address]  T: [phone]  F: [phone]

## 2023-12-22 LAB
ALBUMIN SERPL ELPH-MCNC: 2.4 G/DL — LOW (ref 3.3–5.2)
ALBUMIN SERPL ELPH-MCNC: 2.4 G/DL — LOW (ref 3.3–5.2)
ALP SERPL-CCNC: 104 U/L — SIGNIFICANT CHANGE UP (ref 40–120)
ALP SERPL-CCNC: 104 U/L — SIGNIFICANT CHANGE UP (ref 40–120)
ALT FLD-CCNC: 39 U/L — SIGNIFICANT CHANGE UP
ALT FLD-CCNC: 39 U/L — SIGNIFICANT CHANGE UP
ANION GAP SERPL CALC-SCNC: 14 MMOL/L — SIGNIFICANT CHANGE UP (ref 5–17)
ANISOCYTOSIS BLD QL: SLIGHT — SIGNIFICANT CHANGE UP
ANISOCYTOSIS BLD QL: SLIGHT — SIGNIFICANT CHANGE UP
APTT BLD: 33.5 SEC — SIGNIFICANT CHANGE UP (ref 24.5–35.6)
APTT BLD: 33.5 SEC — SIGNIFICANT CHANGE UP (ref 24.5–35.6)
AST SERPL-CCNC: 68 U/L — HIGH
AST SERPL-CCNC: 68 U/L — HIGH
BASOPHILS # BLD AUTO: 0.06 K/UL — SIGNIFICANT CHANGE UP (ref 0–0.2)
BASOPHILS # BLD AUTO: 0.06 K/UL — SIGNIFICANT CHANGE UP (ref 0–0.2)
BASOPHILS NFR BLD AUTO: 0.6 % — SIGNIFICANT CHANGE UP (ref 0–2)
BASOPHILS NFR BLD AUTO: 0.6 % — SIGNIFICANT CHANGE UP (ref 0–2)
BILIRUB SERPL-MCNC: 0.3 MG/DL — LOW (ref 0.4–2)
BILIRUB SERPL-MCNC: 0.3 MG/DL — LOW (ref 0.4–2)
BUN SERPL-MCNC: 39.3 MG/DL — HIGH (ref 8–20)
BUN SERPL-MCNC: 39.3 MG/DL — HIGH (ref 8–20)
BUN SERPL-MCNC: 43.7 MG/DL — HIGH (ref 8–20)
BUN SERPL-MCNC: 43.7 MG/DL — HIGH (ref 8–20)
BURR CELLS BLD QL SMEAR: PRESENT — SIGNIFICANT CHANGE UP
BURR CELLS BLD QL SMEAR: PRESENT — SIGNIFICANT CHANGE UP
C NEOFORM RRNA SPEC NAA+PROBE-ACNC: SIGNIFICANT CHANGE UP
C NEOFORM RRNA SPEC NAA+PROBE-ACNC: SIGNIFICANT CHANGE UP
CALCIUM SERPL-MCNC: 8.5 MG/DL — SIGNIFICANT CHANGE UP (ref 8.4–10.5)
CALCIUM SERPL-MCNC: 8.5 MG/DL — SIGNIFICANT CHANGE UP (ref 8.4–10.5)
CALCIUM SERPL-MCNC: 8.7 MG/DL — SIGNIFICANT CHANGE UP (ref 8.4–10.5)
CALCIUM SERPL-MCNC: 8.7 MG/DL — SIGNIFICANT CHANGE UP (ref 8.4–10.5)
CHLORIDE SERPL-SCNC: 101 MMOL/L — SIGNIFICANT CHANGE UP (ref 96–108)
CHLORIDE SERPL-SCNC: 101 MMOL/L — SIGNIFICANT CHANGE UP (ref 96–108)
CHLORIDE SERPL-SCNC: 105 MMOL/L — SIGNIFICANT CHANGE UP (ref 96–108)
CHLORIDE SERPL-SCNC: 105 MMOL/L — SIGNIFICANT CHANGE UP (ref 96–108)
CMV DNA CSF QL NAA+PROBE: SIGNIFICANT CHANGE UP
CMV DNA CSF QL NAA+PROBE: SIGNIFICANT CHANGE UP
CO2 SERPL-SCNC: 18 MMOL/L — LOW (ref 22–29)
CO2 SERPL-SCNC: 18 MMOL/L — LOW (ref 22–29)
CO2 SERPL-SCNC: 19 MMOL/L — LOW (ref 22–29)
CO2 SERPL-SCNC: 19 MMOL/L — LOW (ref 22–29)
CREAT SERPL-MCNC: 1.69 MG/DL — HIGH (ref 0.5–1.3)
CREAT SERPL-MCNC: 1.69 MG/DL — HIGH (ref 0.5–1.3)
CREAT SERPL-MCNC: 1.77 MG/DL — HIGH (ref 0.5–1.3)
CREAT SERPL-MCNC: 1.77 MG/DL — HIGH (ref 0.5–1.3)
CSF PCR RESULT: SIGNIFICANT CHANGE UP
CSF PCR RESULT: SIGNIFICANT CHANGE UP
E COLI K1 DNA CSF QL NAA+NON-PROBE: SIGNIFICANT CHANGE UP
E COLI K1 DNA CSF QL NAA+NON-PROBE: SIGNIFICANT CHANGE UP
EGFR: 38 ML/MIN/1.73M2 — LOW
EGFR: 38 ML/MIN/1.73M2 — LOW
EGFR: 40 ML/MIN/1.73M2 — LOW
EGFR: 40 ML/MIN/1.73M2 — LOW
ELLIPTOCYTES BLD QL SMEAR: SLIGHT — SIGNIFICANT CHANGE UP
ELLIPTOCYTES BLD QL SMEAR: SLIGHT — SIGNIFICANT CHANGE UP
EOSINOPHIL # BLD AUTO: 0.01 K/UL — SIGNIFICANT CHANGE UP (ref 0–0.5)
EOSINOPHIL # BLD AUTO: 0.01 K/UL — SIGNIFICANT CHANGE UP (ref 0–0.5)
EOSINOPHIL NFR BLD AUTO: 0.1 % — SIGNIFICANT CHANGE UP (ref 0–6)
EOSINOPHIL NFR BLD AUTO: 0.1 % — SIGNIFICANT CHANGE UP (ref 0–6)
ESCHERICHIA COLI K1: SIGNIFICANT CHANGE UP
ESCHERICHIA COLI K1: SIGNIFICANT CHANGE UP
EV RNA CSF QL NAA+PROBE: SIGNIFICANT CHANGE UP
EV RNA CSF QL NAA+PROBE: SIGNIFICANT CHANGE UP
GAS PNL BLDA: SIGNIFICANT CHANGE UP
GAS PNL BLDA: SIGNIFICANT CHANGE UP
GIANT PLATELETS BLD QL SMEAR: PRESENT — SIGNIFICANT CHANGE UP
GIANT PLATELETS BLD QL SMEAR: PRESENT — SIGNIFICANT CHANGE UP
GLUCOSE SERPL-MCNC: 123 MG/DL — HIGH (ref 70–99)
GLUCOSE SERPL-MCNC: 123 MG/DL — HIGH (ref 70–99)
GLUCOSE SERPL-MCNC: 125 MG/DL — HIGH (ref 70–99)
GLUCOSE SERPL-MCNC: 125 MG/DL — HIGH (ref 70–99)
GP B STREP DNA SPEC QL NAA+PROBE: SIGNIFICANT CHANGE UP
GP B STREP DNA SPEC QL NAA+PROBE: SIGNIFICANT CHANGE UP
GRAM STN FLD: SIGNIFICANT CHANGE UP
GRAM STN FLD: SIGNIFICANT CHANGE UP
HAEM INFLU DNA SPEC QL NAA+PROBE: SIGNIFICANT CHANGE UP
HAEM INFLU DNA SPEC QL NAA+PROBE: SIGNIFICANT CHANGE UP
HCT VFR BLD CALC: 22.7 % — LOW (ref 39–50)
HCT VFR BLD CALC: 22.7 % — LOW (ref 39–50)
HCT VFR BLD CALC: 25.7 % — LOW (ref 39–50)
HCT VFR BLD CALC: 25.7 % — LOW (ref 39–50)
HGB BLD-MCNC: 7.3 G/DL — LOW (ref 13–17)
HGB BLD-MCNC: 7.3 G/DL — LOW (ref 13–17)
HGB BLD-MCNC: 8.7 G/DL — LOW (ref 13–17)
HGB BLD-MCNC: 8.7 G/DL — LOW (ref 13–17)
HHV6 DNA CSF QL NAA+PROBE: SIGNIFICANT CHANGE UP
HHV6 DNA CSF QL NAA+PROBE: SIGNIFICANT CHANGE UP
HSV1 DNA CSF QL NAA+PROBE: SIGNIFICANT CHANGE UP
HSV1 DNA CSF QL NAA+PROBE: SIGNIFICANT CHANGE UP
HSV2 DNA CSF QL NAA+PROBE: SIGNIFICANT CHANGE UP
HSV2 DNA CSF QL NAA+PROBE: SIGNIFICANT CHANGE UP
IMM GRANULOCYTES NFR BLD AUTO: 2.4 % — HIGH (ref 0–0.9)
IMM GRANULOCYTES NFR BLD AUTO: 2.4 % — HIGH (ref 0–0.9)
INR BLD: 1.28 RATIO — HIGH (ref 0.85–1.18)
INR BLD: 1.28 RATIO — HIGH (ref 0.85–1.18)
L MONOCYTOG DNA SPEC QL NAA+PROBE: SIGNIFICANT CHANGE UP
L MONOCYTOG DNA SPEC QL NAA+PROBE: SIGNIFICANT CHANGE UP
LDH CSF L TO P-CCNC: 45 U/L — SIGNIFICANT CHANGE UP
LDH CSF L TO P-CCNC: 45 U/L — SIGNIFICANT CHANGE UP
LDH FLD-CCNC: 45 U/L — SIGNIFICANT CHANGE UP
LDH FLD-CCNC: 45 U/L — SIGNIFICANT CHANGE UP
LYMPHOCYTES # BLD AUTO: 1.16 K/UL — SIGNIFICANT CHANGE UP (ref 1–3.3)
LYMPHOCYTES # BLD AUTO: 1.16 K/UL — SIGNIFICANT CHANGE UP (ref 1–3.3)
LYMPHOCYTES # BLD AUTO: 11.4 % — LOW (ref 13–44)
LYMPHOCYTES # BLD AUTO: 11.4 % — LOW (ref 13–44)
MACROCYTES BLD QL: SLIGHT — SIGNIFICANT CHANGE UP
MACROCYTES BLD QL: SLIGHT — SIGNIFICANT CHANGE UP
MAGNESIUM SERPL-MCNC: 1.9 MG/DL — SIGNIFICANT CHANGE UP (ref 1.6–2.6)
MAGNESIUM SERPL-MCNC: 1.9 MG/DL — SIGNIFICANT CHANGE UP (ref 1.6–2.6)
MAGNESIUM SERPL-MCNC: 1.9 MG/DL — SIGNIFICANT CHANGE UP (ref 1.8–2.6)
MAGNESIUM SERPL-MCNC: 1.9 MG/DL — SIGNIFICANT CHANGE UP (ref 1.8–2.6)
MANUAL SMEAR VERIFICATION: SIGNIFICANT CHANGE UP
MANUAL SMEAR VERIFICATION: SIGNIFICANT CHANGE UP
MCHC RBC-ENTMCNC: 31.6 PG — SIGNIFICANT CHANGE UP (ref 27–34)
MCHC RBC-ENTMCNC: 31.6 PG — SIGNIFICANT CHANGE UP (ref 27–34)
MCHC RBC-ENTMCNC: 32.2 GM/DL — SIGNIFICANT CHANGE UP (ref 32–36)
MCHC RBC-ENTMCNC: 32.2 GM/DL — SIGNIFICANT CHANGE UP (ref 32–36)
MCHC RBC-ENTMCNC: 32.8 PG — SIGNIFICANT CHANGE UP (ref 27–34)
MCHC RBC-ENTMCNC: 32.8 PG — SIGNIFICANT CHANGE UP (ref 27–34)
MCHC RBC-ENTMCNC: 33.9 GM/DL — SIGNIFICANT CHANGE UP (ref 32–36)
MCHC RBC-ENTMCNC: 33.9 GM/DL — SIGNIFICANT CHANGE UP (ref 32–36)
MCV RBC AUTO: 97 FL — SIGNIFICANT CHANGE UP (ref 80–100)
MCV RBC AUTO: 97 FL — SIGNIFICANT CHANGE UP (ref 80–100)
MCV RBC AUTO: 98.3 FL — SIGNIFICANT CHANGE UP (ref 80–100)
MCV RBC AUTO: 98.3 FL — SIGNIFICANT CHANGE UP (ref 80–100)
METAMYELOCYTES # FLD: 1.7 % — HIGH (ref 0–0)
METAMYELOCYTES # FLD: 1.7 % — HIGH (ref 0–0)
MICROCYTES BLD QL: SLIGHT — SIGNIFICANT CHANGE UP
MICROCYTES BLD QL: SLIGHT — SIGNIFICANT CHANGE UP
MONOCYTES # BLD AUTO: 0.45 K/UL — SIGNIFICANT CHANGE UP (ref 0–0.9)
MONOCYTES # BLD AUTO: 0.45 K/UL — SIGNIFICANT CHANGE UP (ref 0–0.9)
MONOCYTES NFR BLD AUTO: 4.4 % — SIGNIFICANT CHANGE UP (ref 2–14)
MONOCYTES NFR BLD AUTO: 4.4 % — SIGNIFICANT CHANGE UP (ref 2–14)
N MEN DNA SPEC QL NAA+PROBE: SIGNIFICANT CHANGE UP
N MEN DNA SPEC QL NAA+PROBE: SIGNIFICANT CHANGE UP
NEUTROPHILS # BLD AUTO: 8.24 K/UL — HIGH (ref 1.8–7.4)
NEUTROPHILS # BLD AUTO: 8.24 K/UL — HIGH (ref 1.8–7.4)
NEUTROPHILS NFR BLD AUTO: 69.6 % — SIGNIFICANT CHANGE UP (ref 43–77)
NEUTROPHILS NFR BLD AUTO: 69.6 % — SIGNIFICANT CHANGE UP (ref 43–77)
NEUTS BAND # BLD: 11.3 % — HIGH (ref 0–8)
NEUTS BAND # BLD: 11.3 % — HIGH (ref 0–8)
NIGHT BLUE STAIN TISS: SIGNIFICANT CHANGE UP
NIGHT BLUE STAIN TISS: SIGNIFICANT CHANGE UP
PARECHOVIRUS A RNA SPEC QL NAA+PROBE: SIGNIFICANT CHANGE UP
PARECHOVIRUS A RNA SPEC QL NAA+PROBE: SIGNIFICANT CHANGE UP
PHOSPHATE SERPL-MCNC: 5.6 MG/DL — HIGH (ref 2.4–4.7)
PHOSPHATE SERPL-MCNC: 5.6 MG/DL — HIGH (ref 2.4–4.7)
PHOSPHATE SERPL-MCNC: 6.2 MG/DL — HIGH (ref 2.4–4.7)
PHOSPHATE SERPL-MCNC: 6.2 MG/DL — HIGH (ref 2.4–4.7)
PLAT MORPH BLD: NORMAL — SIGNIFICANT CHANGE UP
PLAT MORPH BLD: NORMAL — SIGNIFICANT CHANGE UP
PLATELET # BLD AUTO: 228 K/UL — SIGNIFICANT CHANGE UP (ref 150–400)
PLATELET # BLD AUTO: 228 K/UL — SIGNIFICANT CHANGE UP (ref 150–400)
PLATELET # BLD AUTO: 242 K/UL — SIGNIFICANT CHANGE UP (ref 150–400)
PLATELET # BLD AUTO: 242 K/UL — SIGNIFICANT CHANGE UP (ref 150–400)
POIKILOCYTOSIS BLD QL AUTO: SIGNIFICANT CHANGE UP
POIKILOCYTOSIS BLD QL AUTO: SIGNIFICANT CHANGE UP
POLYCHROMASIA BLD QL SMEAR: SIGNIFICANT CHANGE UP
POLYCHROMASIA BLD QL SMEAR: SIGNIFICANT CHANGE UP
POTASSIUM SERPL-MCNC: 4.9 MMOL/L — SIGNIFICANT CHANGE UP (ref 3.5–5.3)
POTASSIUM SERPL-MCNC: 4.9 MMOL/L — SIGNIFICANT CHANGE UP (ref 3.5–5.3)
POTASSIUM SERPL-MCNC: 5.5 MMOL/L — HIGH (ref 3.5–5.3)
POTASSIUM SERPL-MCNC: 5.5 MMOL/L — HIGH (ref 3.5–5.3)
POTASSIUM SERPL-SCNC: 4.9 MMOL/L — SIGNIFICANT CHANGE UP (ref 3.5–5.3)
POTASSIUM SERPL-SCNC: 4.9 MMOL/L — SIGNIFICANT CHANGE UP (ref 3.5–5.3)
POTASSIUM SERPL-SCNC: 5.5 MMOL/L — HIGH (ref 3.5–5.3)
POTASSIUM SERPL-SCNC: 5.5 MMOL/L — HIGH (ref 3.5–5.3)
PROT SERPL-MCNC: 5.3 G/DL — LOW (ref 6.6–8.7)
PROT SERPL-MCNC: 5.3 G/DL — LOW (ref 6.6–8.7)
PROTHROM AB SERPL-ACNC: 14.1 SEC — HIGH (ref 9.5–13)
PROTHROM AB SERPL-ACNC: 14.1 SEC — HIGH (ref 9.5–13)
RBC # BLD: 2.31 M/UL — LOW (ref 4.2–5.8)
RBC # BLD: 2.31 M/UL — LOW (ref 4.2–5.8)
RBC # BLD: 2.65 M/UL — LOW (ref 4.2–5.8)
RBC # BLD: 2.65 M/UL — LOW (ref 4.2–5.8)
RBC # FLD: 14.7 % — HIGH (ref 10.3–14.5)
RBC # FLD: 14.7 % — HIGH (ref 10.3–14.5)
RBC # FLD: 14.8 % — HIGH (ref 10.3–14.5)
RBC # FLD: 14.8 % — HIGH (ref 10.3–14.5)
RBC BLD AUTO: ABNORMAL
RBC BLD AUTO: ABNORMAL
S PNEUM DNA SPEC QL NAA+PROBE: SIGNIFICANT CHANGE UP
S PNEUM DNA SPEC QL NAA+PROBE: SIGNIFICANT CHANGE UP
SODIUM SERPL-SCNC: 133 MMOL/L — LOW (ref 135–145)
SODIUM SERPL-SCNC: 133 MMOL/L — LOW (ref 135–145)
SODIUM SERPL-SCNC: 138 MMOL/L — SIGNIFICANT CHANGE UP (ref 135–145)
SODIUM SERPL-SCNC: 138 MMOL/L — SIGNIFICANT CHANGE UP (ref 135–145)
SPECIMEN SOURCE: SIGNIFICANT CHANGE UP
TROPONIN T, HIGH SENSITIVITY RESULT: 117 NG/L — HIGH (ref 0–51)
TROPONIN T, HIGH SENSITIVITY RESULT: 117 NG/L — HIGH (ref 0–51)
VARIANT LYMPHS # BLD: 0.9 % — SIGNIFICANT CHANGE UP (ref 0–6)
VARIANT LYMPHS # BLD: 0.9 % — SIGNIFICANT CHANGE UP (ref 0–6)
VZV DNA CSF QL NAA+PROBE: SIGNIFICANT CHANGE UP
VZV DNA CSF QL NAA+PROBE: SIGNIFICANT CHANGE UP
WBC # BLD: 10.18 K/UL — SIGNIFICANT CHANGE UP (ref 3.8–10.5)
WBC # BLD: 10.18 K/UL — SIGNIFICANT CHANGE UP (ref 3.8–10.5)
WBC # BLD: 7.72 K/UL — SIGNIFICANT CHANGE UP (ref 3.8–10.5)
WBC # BLD: 7.72 K/UL — SIGNIFICANT CHANGE UP (ref 3.8–10.5)
WBC # FLD AUTO: 10.18 K/UL — SIGNIFICANT CHANGE UP (ref 3.8–10.5)
WBC # FLD AUTO: 10.18 K/UL — SIGNIFICANT CHANGE UP (ref 3.8–10.5)
WBC # FLD AUTO: 7.72 K/UL — SIGNIFICANT CHANGE UP (ref 3.8–10.5)
WBC # FLD AUTO: 7.72 K/UL — SIGNIFICANT CHANGE UP (ref 3.8–10.5)

## 2023-12-22 PROCEDURE — 99291 CRITICAL CARE FIRST HOUR: CPT

## 2023-12-22 PROCEDURE — 99231 SBSQ HOSP IP/OBS SF/LOW 25: CPT

## 2023-12-22 PROCEDURE — 99233 SBSQ HOSP IP/OBS HIGH 50: CPT

## 2023-12-22 PROCEDURE — 70450 CT HEAD/BRAIN W/O DYE: CPT | Mod: 26

## 2023-12-22 PROCEDURE — 93010 ELECTROCARDIOGRAM REPORT: CPT

## 2023-12-22 PROCEDURE — 95720 EEG PHY/QHP EA INCR W/VEEG: CPT

## 2023-12-22 PROCEDURE — 99232 SBSQ HOSP IP/OBS MODERATE 35: CPT

## 2023-12-22 RX ORDER — MEROPENEM 1 G/30ML
1000 INJECTION INTRAVENOUS EVERY 12 HOURS
Refills: 0 | Status: DISCONTINUED | OUTPATIENT
Start: 2023-12-23 | End: 2023-12-27

## 2023-12-22 RX ORDER — SODIUM CHLORIDE 9 MG/ML
1000 INJECTION, SOLUTION INTRAVENOUS ONCE
Refills: 0 | Status: COMPLETED | OUTPATIENT
Start: 2023-12-22 | End: 2023-12-22

## 2023-12-22 RX ORDER — ACETAMINOPHEN 500 MG
1000 TABLET ORAL ONCE
Refills: 0 | Status: COMPLETED | OUTPATIENT
Start: 2023-12-22 | End: 2023-12-22

## 2023-12-22 RX ORDER — SODIUM CHLORIDE 9 MG/ML
1000 INJECTION, SOLUTION INTRAVENOUS
Refills: 0 | Status: DISCONTINUED | OUTPATIENT
Start: 2023-12-22 | End: 2023-12-23

## 2023-12-22 RX ORDER — HYDROCORTISONE 20 MG
100 TABLET ORAL EVERY 8 HOURS
Refills: 0 | Status: DISCONTINUED | OUTPATIENT
Start: 2023-12-22 | End: 2023-12-23

## 2023-12-22 RX ORDER — PANTOPRAZOLE SODIUM 20 MG/1
40 TABLET, DELAYED RELEASE ORAL EVERY 12 HOURS
Refills: 0 | Status: DISCONTINUED | OUTPATIENT
Start: 2023-12-22 | End: 2023-12-23

## 2023-12-22 RX ORDER — METOPROLOL TARTRATE 50 MG
5 TABLET ORAL ONCE
Refills: 0 | Status: COMPLETED | OUTPATIENT
Start: 2023-12-22 | End: 2023-12-22

## 2023-12-22 RX ORDER — SODIUM CHLORIDE 9 MG/ML
1000 INJECTION, SOLUTION INTRAVENOUS
Refills: 0 | Status: DISCONTINUED | OUTPATIENT
Start: 2023-12-22 | End: 2023-12-22

## 2023-12-22 RX ORDER — LEVETIRACETAM 250 MG/1
1000 TABLET, FILM COATED ORAL EVERY 12 HOURS
Refills: 0 | Status: DISCONTINUED | OUTPATIENT
Start: 2023-12-22 | End: 2023-12-27

## 2023-12-22 RX ADMIN — SENNA PLUS 2 TABLET(S): 8.6 TABLET ORAL at 22:18

## 2023-12-22 RX ADMIN — Medication 100 MILLIGRAM(S): at 13:23

## 2023-12-22 RX ADMIN — CHLORHEXIDINE GLUCONATE 1 APPLICATION(S): 213 SOLUTION TOPICAL at 05:18

## 2023-12-22 RX ADMIN — PROPOFOL 4.37 MICROGRAM(S)/KG/MIN: 10 INJECTION, EMULSION INTRAVENOUS at 20:51

## 2023-12-22 RX ADMIN — Medication 650 MILLIGRAM(S): at 17:58

## 2023-12-22 RX ADMIN — MEMANTINE HYDROCHLORIDE 5 MILLIGRAM(S): 10 TABLET ORAL at 17:28

## 2023-12-22 RX ADMIN — PANTOPRAZOLE SODIUM 40 MILLIGRAM(S): 20 TABLET, DELAYED RELEASE ORAL at 17:38

## 2023-12-22 RX ADMIN — Medication 100 MILLIGRAM(S): at 22:19

## 2023-12-22 RX ADMIN — Medication 650 MILLIGRAM(S): at 01:05

## 2023-12-22 RX ADMIN — LEVETIRACETAM 1000 MILLIGRAM(S): 250 TABLET, FILM COATED ORAL at 17:36

## 2023-12-22 RX ADMIN — Medication 1 TABLET(S): at 12:00

## 2023-12-22 RX ADMIN — PANTOPRAZOLE SODIUM 40 MILLIGRAM(S): 20 TABLET, DELAYED RELEASE ORAL at 05:16

## 2023-12-22 RX ADMIN — Medication 650 MILLIGRAM(S): at 17:28

## 2023-12-22 RX ADMIN — PROPOFOL 4.37 MICROGRAM(S)/KG/MIN: 10 INJECTION, EMULSION INTRAVENOUS at 06:15

## 2023-12-22 RX ADMIN — Medication 5 MILLIGRAM(S): at 16:43

## 2023-12-22 RX ADMIN — Medication 100 MILLIGRAM(S): at 02:35

## 2023-12-22 RX ADMIN — LEVETIRACETAM 400 MILLIGRAM(S): 250 TABLET, FILM COATED ORAL at 05:16

## 2023-12-22 RX ADMIN — Medication 400 MILLIGRAM(S): at 09:14

## 2023-12-22 RX ADMIN — Medication 250 MILLIGRAM(S): at 09:14

## 2023-12-22 RX ADMIN — Medication 1 MILLIGRAM(S): at 12:00

## 2023-12-22 RX ADMIN — VASOPRESSIN 6 UNIT(S)/MIN: 20 INJECTION INTRAVENOUS at 17:54

## 2023-12-22 RX ADMIN — Medication 2 MILLIGRAM(S): at 22:19

## 2023-12-22 RX ADMIN — SODIUM CHLORIDE 500 MILLILITER(S): 9 INJECTION, SOLUTION INTRAVENOUS at 02:35

## 2023-12-22 RX ADMIN — MEROPENEM 1000 MILLIGRAM(S): 1 INJECTION INTRAVENOUS at 13:23

## 2023-12-22 RX ADMIN — MEROPENEM 1000 MILLIGRAM(S): 1 INJECTION INTRAVENOUS at 05:16

## 2023-12-22 RX ADMIN — ATORVASTATIN CALCIUM 80 MILLIGRAM(S): 80 TABLET, FILM COATED ORAL at 22:18

## 2023-12-22 RX ADMIN — PROPOFOL 4.37 MICROGRAM(S)/KG/MIN: 10 INJECTION, EMULSION INTRAVENOUS at 08:24

## 2023-12-22 RX ADMIN — Medication 1000 MILLIGRAM(S): at 09:44

## 2023-12-22 RX ADMIN — Medication 47.7 MICROGRAM(S)/KG/MIN: at 17:45

## 2023-12-22 RX ADMIN — VASOPRESSIN 6 UNIT(S)/MIN: 20 INJECTION INTRAVENOUS at 00:30

## 2023-12-22 NOTE — PROGRESS NOTE ADULT - SUBJECTIVE AND OBJECTIVE BOX
HPI:   82y M with PMH HTN, CAD s/p stents, renal cell carcinoma status post left nephrectomy, bladder CA, BPH, CHF, LBBB, vertigo,  HLD, 5.5cm AAA without rupture post EVAR, paroxysmal A-fib on Eliquis, Plavix, and aspirin, early stage Alzheimer dementia transferred from [facility] s/p unwitnessed fall with head strike at home found by wife on floor at 8am. Patient brought to urgent care by wife and had 5 staples to posterior scalp but was instructed to go to nearest ED.  CT head at [facility] showed R frontal IPH, SDH, SAH at 9:00. CTA stroke protocol not performed at [facility]. Patient BIB on 6L NC.  Pt GCS 15 on arrival, A&Ox2 on arrival. After initial assessment, patient noted to be vomiting enroute to CT scanner.     Interval History: pt upgraded to MICU yesterday for respiratory failure secondary to aspiration and septic shock on 2 pressors. Pt noted to have LP done yesterday with opening pressure of [id number] and 9cc taken off. Post LP head CT stable. Pt exam about the same, require stimulation to wake and weakly follows commands on the right, WD on the left.     Vital Signs Last 24 Hrs  T(C): 38.4 (22 Dec 2023 08:00), Max: 38.7 (21 Dec 2023 15:30)  T(F): 101.1 (22 Dec 2023 08:00), Max: 101.7 (21 Dec 2023 15:30)  HR: 119 (22 Dec 2023 08:53) (72 - 128)  BP: 119/41 (21 Dec 2023 13:15) (68/47 - 133/69)  BP(mean): 63 (21 Dec 2023 13:15) (55 - 101)  RR: 26 (22 Dec 2023 08:45) (16 - 42)  SpO2: 100% (22 Dec 2023 08:53) (88% - 100%)    Parameters below as of 22 Dec 2023 08:00  Patient On (Oxygen Delivery Method): ventilator    O2 Concentration (%): 40    Physical Exam:     Constitutional: intubated, propofol held  	Neuro  	* Mental Status:  GCS 10T: E3, V1T, M6. opens eyes to voice with encouragement, weakly follows commands on the right, weakly WD on the left  	* Cranial Nerves: Pupils 2mm reactive, face symmetric    	* Motor: RUE follows commands weakly able to get AG, RLE wiggles toes does not get AG, left side weakly WD   	* Sensory: Sensation intact to noxious on the left, intact to light touch on the right  	* Reflexes: Not assessed  	* Gait: Not assessed    ct< from: CT Head No Cont (12.22.23 @ 03:51) >  IMPRESSION:  Interval decrease in size of the simple density fluid collection, likely   either a pseudomeningocele or seroma, superficial to the right-sided   cranioplasty.  No other significant change.    < end of copied text >      	   HPI:   82y M with PMH HTN, CAD s/p stents, renal cell carcinoma status post left nephrectomy, bladder CA, BPH, CHF, LBBB, vertigo,  HLD, 5.5cm AAA without rupture post EVAR, paroxysmal A-fib on Eliquis, Plavix, and aspirin, early stage Alzheimer dementia transferred from [facility] s/p unwitnessed fall with head strike at home found by wife on floor at 8am. Patient brought to urgent care by wife and had 5 staples to posterior scalp but was instructed to go to nearest ED.  CT head at [place] showed R frontal IPH, SDH, SAH at 9:00. CTA stroke protocol not performed at [facility]. Patient BIB on 6L NC.  Pt GCS 15 on arrival, A&Ox2 on arrival. After initial assessment, patient noted to be vomiting enroute to CT scanner.     Interval History: pt upgraded to MICU yesterday for respiratory failure secondary to aspiration and septic shock on 2 pressors. Pt noted to have LP done yesterday with opening pressure of [id number] and 9cc taken off. Post LP head CT stable. Pt exam about the same, require stimulation to wake and weakly follows commands on the right, WD on the left.     Vital Signs Last 24 Hrs  T(C): 38.4 (22 Dec 2023 08:00), Max: 38.7 (21 Dec 2023 15:30)  T(F): 101.1 (22 Dec 2023 08:00), Max: 101.7 (21 Dec 2023 15:30)  HR: 119 (22 Dec 2023 08:53) (72 - 128)  BP: 119/41 (21 Dec 2023 13:15) (68/47 - 133/69)  BP(mean): 63 (21 Dec 2023 13:15) (55 - 101)  RR: 26 (22 Dec 2023 08:45) (16 - 42)  SpO2: 100% (22 Dec 2023 08:53) (88% - 100%)    Parameters below as of 22 Dec 2023 08:00  Patient On (Oxygen Delivery Method): ventilator    O2 Concentration (%): 40    Physical Exam:     Constitutional: intubated, propofol held  	Neuro  	* Mental Status:  GCS 10T: E3, V1T, M6. opens eyes to voice with encouragement, weakly follows commands on the right, weakly WD on the left  	* Cranial Nerves: Pupils 2mm reactive, face symmetric    	* Motor: RUE follows commands weakly able to get AG, RLE wiggles toes does not get AG, left side weakly WD   	* Sensory: Sensation intact to noxious on the left, intact to light touch on the right  	* Reflexes: Not assessed  	* Gait: Not assessed    ct< from: CT Head No Cont (12.22.23 @ 03:51) >  IMPRESSION:  Interval decrease in size of the simple density fluid collection, likely   either a pseudomeningocele or seroma, superficial to the right-sided   cranioplasty.  No other significant change.    < end of copied text >

## 2023-12-22 NOTE — PROGRESS NOTE ADULT - ASSESSMENT
82y M with PMH HTN, CAD s/p stents, renal cell carcinoma status post left nephrectomy, bladder CA, BPH, CHF, LBBB, vertigo,  HLD, 5.5cm AAA without rupture post EVAR, paroxysmal A-fib on Eliquis, Plavix, and aspirin, early stage Alzheimer dementia transferred from [facility] s/p unwitnessed fall with head strike at home found by wife on floor at 8am. GI consulted after coffee ground output via NGT.     Coffee ground Emesis:  Now resolved, NGT with minimal bilious output.  Hemoglobin remain stable, 8.7 gm this morning. No evidence of overt GI bleed.   Continue Protonix 40 mg BID  Avoid use of NSAIDs  Trend CBC, transfuse as needed   Continue bowel regimen. Monitor bowel movement, stool count   82y M with PMH HTN, CAD s/p stents, renal cell carcinoma status post left nephrectomy, bladder CA, BPH, CHF, LBBB, vertigo,  HLD, 5.5cm AAA without rupture post EVAR, paroxysmal A-fib on Eliquis, Plavix, and aspirin, early stage Alzheimer dementia transferred from [facility] s/p unwitnessed fall with head strike at home found by wife on floor at 8am. GI consulted after coffee ground output via NGT.     Coffee ground Emesis:  Now resolved, NGT with minimal bilious output.  Hemoglobin remain stable, 8.7 gm this morning. No evidence of overt GI bleed.   Continue Protonix 40 mg BID  Avoid use of NSAIDs  Trend CBC, transfuse as needed   Continue bowel regimen. Monitor bowel movement, stool count

## 2023-12-22 NOTE — PROGRESS NOTE ADULT - SUBJECTIVE AND OBJECTIVE BOX
Patient is a 83y old  Male who presents with a chief complaint of s/p unwitnessed fall with head strike (22 Dec 2023 10:07)      BRIEF HOSPITAL COURSE:   83M PMH HTN, CAD s/p PCO, RCC s/p left nephrectomy, bladder Ca, BPH, CHF, Vertigo, HLD, AAA s/p EVAR, parox Afib, h/o HIT, Alzheimer transferred s/p fall with Rt frontal IPH. Initially had R hemicraniectomy 11/10/23 with drain placement; course complicated by AFib with RVR, Klebsiella PNA, hypoxic respiratory failure, LUE DVT and questionable increase in AAA size. Started on AC with waxing/waning mental status prompting head imaging with evidence of new bleed, so systemic A/C held. TTE showed mobile echodensity on aortic valve and strokes noted with multiple arterial territories, WHIT was recommended but family declined. Pt underwent cranioplasty on 12/7, and had subgaleal drain removed 12/10/23. ABx started for UTI. Pt re-upgraded to NSICU on 12/15 given worsening R frontal IPH, given Andexxa for reversal of NOAC, and subsequently downgraded on 12/17.    On 12/21/23 RRT called for unresponsiveness, vomiting episode, aspiration, and hypotension. Pt was intubated at the bedside, started on vasopressors, and transferred to the MICU. Noted with distended and tense abdomen, initial concern for ischemic bowel given presence of aortic vegetation, CTA of abdomen performed without evidence of mesenteric ischemia. LP performed 12/21, negative for meningitis. cvEEG started, with potential epileptogenic focus.        PAST MEDICAL & SURGICAL HISTORY:  HTN  dx 2008      Bladder Cancer (ICD9 188.9)  TCC, dx 1999      Hyperlipemia (ICD9 272.4)  dx 2008      Lt Renal Neoplasm  left - s/p left nephrectomy      Hx antineoplastic chemotherapy (BCG 2012)      Left bundle branch block      Vertigo      Heel spur, left      Abdominal aortic aneurysm (AAA)  5.5 cm      BPH (benign prostatic hyperplasia)      Renal mass, right  current - following with Dr Pamela SHEARER (coronary artery disease)      Bladder cancer, primary, with metastasis from bladder to other site  Left kidney      Acute renal failure, unspecified acute renal failure type      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)      Abdominal aortic aneurysm (AAA) without rupture      Congestive heart failure, unspecified chronicity, unspecified heart failure type      Hypertension      History of abdominal aortic aneurysm (AAA)      CAD (coronary artery disease)      Alzheimers disease      urethral Stent 7/2008  stent placement and removal      S/P Cystoscopy  bladder polyps removal multiple times (since 1999), 6/10 , 10/2011 & 10/17/2011, 5/12, 11/2012, last 5/13/13, 12/2013, 7/2014      S/P Tonsillectomy      History of Lt  Nephrectomy  6/10 - left      History of cystoscopy  April 2015      H/O abdominal aortic aneurysm repair      S/P AAA repair      History of nephrectomy  Left      Presence of stent in LAD coronary artery      History of heart artery stent  DIONICIO            Medications:  meropenem Injectable 1000 milliGRAM(s) IV Push every 8 hours  vancomycin  IVPB 1000 milliGRAM(s) IV Intermittent <User Schedule>    doxazosin 2 milliGRAM(s) Oral at bedtime  norepinephrine Infusion 0.7 MICROgram(s)/kG/Min IV Continuous <Continuous>      acetaminophen     Tablet .. 650 milliGRAM(s) Oral every 6 hours PRN  levETIRAcetam   Injectable 1000 milliGRAM(s) IV Push every 12 hours  levETIRAcetam  IVPB 500 milliGRAM(s) IV Intermittent every 12 hours  memantine 5 milliGRAM(s) Oral two times a day  propofol Infusion 10 MICROgram(s)/kG/Min IV Continuous <Continuous>        pantoprazole  Injectable 40 milliGRAM(s) IV Push every 12 hours  polyethylene glycol 3350 17 Gram(s) Oral daily  senna 2 Tablet(s) Oral at bedtime      atorvastatin 80 milliGRAM(s) Oral at bedtime  hydrocortisone sodium succinate Injectable 100 milliGRAM(s) IV Push every 8 hours  vasopressin Infusion 0.04 Unit(s)/Min IV Continuous <Continuous>    folic acid 1 milliGRAM(s) Oral daily  lactated ringers. 1000 milliLiter(s) IV Continuous <Continuous>  Nephro-roma 1 Tablet(s) Oral daily      artificial tears (preservative free) Ophthalmic Solution 1 Drop(s) Both EYES every 6 hours PRN  chlorhexidine 2% Cloths 1 Application(s) Topical <User Schedule>        Mode: AC/ CMV (Assist Control/ Continuous Mandatory Ventilation)  RR (machine): 12  TV (machine): 450  FiO2: 40  PEEP: 8  ITime: 1  MAP: 13  PIP: 22      ICU Vital Signs Last 24 Hrs  T(C): 38.6 (22 Dec 2023 10:00), Max: 38.7 (21 Dec 2023 15:30)  T(F): 101.5 (22 Dec 2023 10:00), Max: 101.7 (21 Dec 2023 15:30)  HR: 125 (22 Dec 2023 10:45) (72 - 139)  BP: 119/41 (21 Dec 2023 13:15) (78/54 - 133/69)  BP(mean): 63 (21 Dec 2023 13:15) (60 - 101)  ABP: 130/60 (22 Dec 2023 10:45) (56/21 - 195/64)  ABP(mean): 84 (22 Dec 2023 10:45) (37 - 112)  RR: 28 (22 Dec 2023 10:45) (16 - 37)  SpO2: 100% (22 Dec 2023 10:45) (98% - 100%)    O2 Parameters below as of 22 Dec 2023 08:00  Patient On (Oxygen Delivery Method): ventilator    O2 Concentration (%): 40        ABG - ( 22 Dec 2023 02:43 )  pH, Arterial: 7.380 pH, Blood: x     /  pCO2: 31    /  pO2: 173   / HCO3: 18    / Base Excess: -6.8  /  SaO2: 99.6                I&O's Detail    21 Dec 2023 07:01  -  22 Dec 2023 07:00  --------------------------------------------------------  IN:    Dexmedetomidine: 18 mL    Dexmedetomidine: 72 mL    FentaNYL: 3.7 mL    FentaNYL: 14.7 mL    IV PiggyBack: 250 mL    IV PiggyBack: 100 mL    IV PiggyBack: 200 mL    Lactated Ringers: 825 mL    Lactated Ringers Bolus: 3000 mL    Norepinephrine: 14 mL    Norepinephrine: 625 mL    Pantoprazole: 170 mL    Propofol: 115.9 mL    Vasopressin: 120 mL  Total IN: 5528.3 mL    OUT:    Indwelling Catheter - Urethral (mL): 655 mL    Nasogastric/Oral tube (mL): 250 mL  Total OUT: 905 mL    Total NET: 4623.3 mL      22 Dec 2023 07:01  -  22 Dec 2023 10:57  --------------------------------------------------------  IN:    IV PiggyBack: 250 mL    Lactated Ringers: 300 mL    Norepinephrine: 72 mL    Vasopressin: 24 mL  Total IN: 646 mL    OUT:    Indwelling Catheter - Urethral (mL): 160 mL  Total OUT: 160 mL    Total NET: 486 mL            LABS:                        8.7    7.72  )-----------( 242      ( 22 Dec 2023 01:45 )             25.7     12-22    138  |  105  |  39.3<H>  ----------------------------<  123<H>  5.5<H>   |  19.0<L>  |  1.77<H>    Ca    8.7      22 Dec 2023 01:45  Phos  6.2     12-22  Mg     1.9     12-22    TPro  5.3<L>  /  Alb  2.4<L>  /  TBili  0.3<L>  /  DBili  x   /  AST  68<H>  /  ALT  39  /  AlkPhos  104  12-22          CAPILLARY BLOOD GLUCOSE      POCT Blood Glucose.: 329 mg/dL (21 Dec 2023 07:34)    PT/INR - ( 22 Dec 2023 01:45 )   PT: 14.1 sec;   INR: 1.28 ratio         PTT - ( 22 Dec 2023 01:45 )  PTT:33.5 sec  Urinalysis Basic - ( 22 Dec 2023 01:45 )    Color: x / Appearance: x / SG: x / pH: x  Gluc: 123 mg/dL / Ketone: x  / Bili: x / Urobili: x   Blood: x / Protein: x / Nitrite: x   Leuk Esterase: x / RBC: x / WBC x   Sq Epi: x / Non Sq Epi: x / Bacteria: x      CULTURES:  Rapid RVP Result: Monatedaily (12-20 @ 09:55)  Culture Results:   No growth at 24 hours (12-20 @ 07:32)  Culture Results:   No growth at 24 hours (12-20 @ 07:25)      Physical Examination:  GENERAL: Intubated, sedated  HEENT: S/p R cranioplasty  NECK: Supple, trachea midline  PULM: Coarse breath sounds anteriorly  CVS: +S1, S2  ABD: Less distended compared to yesterday    DEVICES:     RADIOLOGY:  < from: CT Angio Abdomen and Pelvis w/ IV Cont (12.21.23 @ 17:13) >  IMPRESSION:    Diffuse groundglass centrilobular and tree-in-bud nodular opacities which   is suggestive of infectious or aspiration bronchiolitis.    Bilateral lower lobe airspace consolidations may represent pneumonia   and/or atelectasis.    Findings suggestive of endoleak near the proximal stent graft attachment;   evaluation is somewhat limited without noncontrast exam.  Stable size of the aneurysm sac.    Diffuse colonic fecal retention with colonic distention.  No CT evidence for acute thromboembolic mesenteric ischemia.    Findings were discussed by telephone with Dr. Soriano at the time of   interpretation on 12/21/2023.    Other findings as discussed above.    --- End of Report ---    < end of copied text >      < from: CT Head No Cont (12.22.23 @ 03:51) >    IMPRESSION:  Interval decrease in size of the simple density fluid collection, likely   either a pseudomeningocele or seroma, superficial to the right-sided   cranioplasty.  No other significant change.    --- End of Report ---            MI KELLEY MD; Attending Radiologist  This document has been electronically signed. Dec 22 2023  4:51AM    < end of copied text >

## 2023-12-22 NOTE — PROGRESS NOTE ADULT - ASSESSMENT
83M w/ PMH HTN, CAD s/p stents on ASA/Plavix, RCC s/p L nephrectomy, bladder CA, BPH, CHF, LBBB, vertigo, HLD, 5.5 cm AAA without rupture post EVAR, paroxysmal afib on Eliquis, early stage Alzheimer's dementia, presented to [facility] 11/10/23 s/p unwitnessed fall with head strike at home found by wife, found with multicompartmental ICH, s/p R craniectomy 11/10/23, course significant for Klebsiella pneumonia, hypernatremia, lethargy, LUE DVT, new anterior falcine SDH.  12/7 Right Cranioplasty, CTH s/p starting eliquis showed new R frontal ICH. 4hr repeat CTH showed worsening R frontal ICH. Rpt CTH stable and patient was downgraded from NSICU. Overnight the patient became unresponsive and the patient was subsequently  intubated and upgraded to MICU for septic shock and respirtory failure secondary to aspiration event.    Plan   - LP results reviewed does not appear overtly infected, will f/u follow results   - Post LP head CT reviewed and stable   - Minimize sedation for accurate and consistent exams  - q2 neuro checks   - Keppra 500 BID, EEG results shows no seizure activity   - -160   - Feeds per MICU   - SCDS and Lovenox for dvt ppx, pt has had multiple bleed events on full dose AC he is NOT cleared for that   - Will be cleared for ASA starting 12/23   - Will change head dressing after EEG d/c for wound dehiscence      83M w/ PMH HTN, CAD s/p stents on ASA/Plavix, RCC s/p L nephrectomy, bladder CA, BPH, CHF, LBBB, vertigo, HLD, 5.5 cm AAA without rupture post EVAR, paroxysmal afib on Eliquis, early stage Alzheimer's dementia, presented to [facility] 11/10/23 s/p unwitnessed fall with head strike at home found by wife, found with multicompartmental ICH, s/p R craniectomy 11/10/23, course significant for Klebsiella pneumonia, hypernatremia, lethargy, LUE DVT, new anterior falcine SDH.  12/7 Right Cranioplasty, CTH s/p starting eliquis showed new R frontal ICH. 4hr repeat CTH showed worsening R frontal ICH. Rpt CTH stable and patient was downgraded from NSICU. Overnight the patient became unresponsive and the patient was subsequently  intubated and upgraded to MICU for septic shock and respirtory failure secondary to aspiration event.    Plan   - LP results reviewed does not appear overtly infected, will f/u follow results   - Post LP head CT reviewed and stable   - Minimize sedation for accurate and consistent exams  - q2 neuro checks   - Keppra 500 BID, EEG results shows no seizure activity   - -160   - Feeds per MICU   - SCDS and Lovenox for dvt ppx, pt has had multiple bleed events on full dose AC he is NOT cleared for that   - Will be cleared for ASA starting 12/23   - Will change head dressing after EEG d/c for wound dehiscence      83M w/ PMH HTN, CAD s/p stents on ASA/Plavix, RCC s/p L nephrectomy, bladder CA, BPH, CHF, LBBB, vertigo, HLD, 5.5 cm AAA without rupture post EVAR, paroxysmal afib on Eliquis, early stage Alzheimer's dementia, presented to [facility] 11/10/23 s/p unwitnessed fall with head strike at home found by wife, found with multicompartmental ICH, s/p R craniectomy 11/10/23, course significant for Klebsiella pneumonia, hypernatremia, lethargy, LUE DVT, new anterior falcine SDH.  12/7 Right Cranioplasty, CTH s/p starting eliquis showed new R frontal ICH. 4hr repeat CTH showed worsening R frontal ICH. Rpt CTH stable and patient was downgraded from NSICU. Overnight the patient became unresponsive and the patient was subsequently  intubated and upgraded to MICU for septic shock and respirtory failure secondary to aspiration event.    Plan   - LP results reviewed does not appear overtly infected, will f/u follow results   - Post LP head CT reviewed and stable   - Minimize sedation for accurate and consistent exams  - q2 neuro checks   - Keppra 500 BID, EEG results shows no seizure activity   - -160   - Feeds per MICU   - SCDS and chemical ppx for dvt ppx, pt has had multiple bleed events on FULL DOSE AC he is NOT cleared for that   - Will be cleared for ASA starting 12/23   - Will change head dressing after EEG d/c for wound dehiscence      83M w/ PMH HTN, CAD s/p stents on ASA/Plavix, RCC s/p L nephrectomy, bladder CA, BPH, CHF, LBBB, vertigo, HLD, 5.5 cm AAA without rupture post EVAR, paroxysmal afib on Eliquis, early stage Alzheimer's dementia, presented to [facility] 11/10/23 s/p unwitnessed fall with head strike at home found by wife, found with multicompartmental ICH, s/p R craniectomy 11/10/23, course significant for Klebsiella pneumonia, hypernatremia, lethargy, LUE DVT, new anterior falcine SDH.  12/7 Right Cranioplasty, CTH s/p starting eliquis showed new R frontal ICH. 4hr repeat CTH showed worsening R frontal ICH. Rpt CTH stable and patient was downgraded from NSICU. Overnight the patient became unresponsive and the patient was subsequently  intubated and upgraded to MICU for septic shock and respirtory failure secondary to aspiration event.    Plan   - LP results reviewed does not appear overtly infected, will f/u follow results   - Post LP head CT reviewed and stable   - Minimize sedation for accurate and consistent exams  - q2 neuro checks   - Keppra 500 BID, EEG results shows no seizure activity   - -160   - Feeds per MICU   - SCDS and chemical ppx for dvt ppx, pt has had multiple bleed events on FULL DOSE AC he is NOT cleared for that   - Will be cleared for ASA starting 12/23   - Will change head dressing after EEG d/c for wound dehiscence

## 2023-12-22 NOTE — CHART NOTE - NSCHARTNOTEFT_GEN_A_CORE
Source: Patient [ ]  Family [ ]   other [x] EMR    Current Diet: Diet, NPO (12-21-23 @ 08:39)    Current Weight:   12/7 55.8 kg  11/23 65.8 kg  11/18 63.6 kg    % Weight Change: 12.3% weight loss x 1 month, weight is on downward trend, no edema per documentation     Pertinent Medications: MEDICATIONS  (STANDING):  folic acid 1 milliGRAM(s) Oral daily  hydrocortisone sodium succinate Injectable 100 milliGRAM(s) IV Push every 8 hours  lactated ringers. 1000 milliLiter(s) (75 mL/Hr) IV Continuous <Continuous>  Nephro-roma 1 Tablet(s) Oral daily  norepinephrine Infusion 0.7 MICROgram(s)/kG/Min (47.7 mL/Hr) IV Continuous <Continuous>  pantoprazole  Injectable 40 milliGRAM(s) IV Push every 12 hours  polyethylene glycol 3350 17 Gram(s) Oral daily  propofol Infusion 10 MICROgram(s)/kG/Min (4.37 mL/Hr) IV Continuous <Continuous>  senna 2 Tablet(s) Oral at bedtime  vancomycin  IVPB 1000 milliGRAM(s) IV Intermittent <User Schedule>  vasopressin Infusion 0.04 Unit(s)/Min (6 mL/Hr) IV Continuous <Continuous>    Pertinent Labs: 12-22 Na138 mmol/L Glu 123 mg/dL<H> K+ 5.5 mmol/L<H> Cr  1.77 mg/dL<H> BUN 39.3 mg/dL<H> Phos 6.2 mg/dL<H> Alb 2.4 g/dL<L>    Skin: R cranioplasty incision, MAD/IAD, DTI R buttock, Stage 1 coccyx, Stage 2 R buttock PIs    Nutrition focused physical exam previously conducted - found signs of malnutrition [ ]absent [x]present    Subcutaneous fat loss: Mod [x] Orbital fat pads region, [x]Buccal fat region, [x]Triceps region, [x]Ribs region    Muscle wasting: Mod [x]Temples region, [x]Clavicle region, [x]Shoulder region, [ ]Scapula region, [ ]Interosseous region,  [ ]thigh region, [ ]Calf region    Estimated Needs:   [ ] no change since previous assessment  [x] recalculated:   6802-8074 kcals/day (based on 30-35 kcal/kg BW 55.8 kg)  78-89 g pro/day (based on 1.6-1.8 g/kg BW 55.8 kg)    Hospital Course:  84 y/o M with a h/o HTN, CAD s/p PCO, RCC s/p left nephrectomy, bladder CA, BPH, CHF, Vertigo, HLD, AAA s/p EVAR, parox Afib, h/o HIT, Alzheimer's, initially admitted on 11/10 s/p fall with Rt frontal IPH. Underwent R hemicraniectomy on 11/10 with drain placement. Hospital course complicated by AFib with RVR, Klebsiella PNA, hypoxic respiratory failure, LUE DVT and questionable increase in AAA size. Started on AC with waxing/waning mental status prompting head imaging with evidence of new bleed, so systemic A/C held. TTE showed mobile echodensity on aortic valve and strokes noted within multiple arterial territories, WHIT was recommended but family declined. Pt underwent cranioplasty on 12/7, and had subgaleal drain removed 12/10. ABx started for UTI. Pt re-upgraded to NSICU on 12/15 given worsening R frontal IPH, given Andexxa for reversal of NOAC, and subsequently downgraded on 12/17. Transferred to MICU earlier today after developing altered mentation with vomiting and aspiration of dark liquid stomach content. Subsequently developed septic shock state requiring dual IV vasopressor support. CT C/A/P suggestive of aspiration pneumonitis/pneumonia, questionable AAA endoleak, and diffuse colonic fecal retention with colonic distention. No evidence of mesenteric ischemia.    Current Nutrition Diagnosis: Pt remains at high nutrition risk secondary to severe (acute) protein calorie malnutrition related to inability to meet sufficient protein energy needs in setting of TBI, - R frontal IPH, SDH, tSAH; s/p R decompressive hemicraniectomy 11/10 as evidenced by physical signs of mod muscle/fat loss and meeting < 50% estimated energy needs > 5days, weight loss.  Pt previously on Minced and Moist diet with moderately thick liquids with fair-good po intake, now NPO secondary to intubation/sedation s/p coffee ground emesis on 12/21 and concern for aspiration pna. Pt was found to have fecal retention and colonic distention, NGT placed to suction, now having BMs, last BM today, bowel regimen remains in place. Propofol @ 4.37 ml/hr x 24 hrs = 115 kcals/day. Recommendations below:    Recommendations:   1. When medically feasible, start TF Vital 1.5 @ 10 cc/hr and increase by 10 cc q 4 hrs until goal rate 65 cc/hr x 18 hrs (1170 ml, 1755 kcals, 79 g pro, 889 ml free water); additional free water per medical teams discretion  2. Continue bowel regimen prn, monitor BMs and I/Os; as well as initiation and tolerance of TF regimen  3. Monitor kcals from propofol and TG levels  4. Rx: continue daily nephro-roma and folic acid  5. Daily weights and trends     Monitoring and Evaluation:   [ ] PO intake [x] Tolerance to diet prescription [X] Weights  [X] Follow up per protocol [X] Labs: Source: Patient [ ]  Family [ ]   other [x] EMR    Current Diet: Diet, NPO (12-21-23 @ 08:39)    Current Weight:   12/7 55.8 kg  11/23 65.8 kg  11/18 63.6 kg    % Weight Change: 12.3% weight loss x 1 month, weight is on downward trend, no edema per documentation     Pertinent Medications: MEDICATIONS  (STANDING):  folic acid 1 milliGRAM(s) Oral daily  hydrocortisone sodium succinate Injectable 100 milliGRAM(s) IV Push every 8 hours  lactated ringers. 1000 milliLiter(s) (75 mL/Hr) IV Continuous <Continuous>  Nephro-roma 1 Tablet(s) Oral daily  norepinephrine Infusion 0.7 MICROgram(s)/kG/Min (47.7 mL/Hr) IV Continuous <Continuous>  pantoprazole  Injectable 40 milliGRAM(s) IV Push every 12 hours  polyethylene glycol 3350 17 Gram(s) Oral daily  propofol Infusion 10 MICROgram(s)/kG/Min (4.37 mL/Hr) IV Continuous <Continuous>  senna 2 Tablet(s) Oral at bedtime  vancomycin  IVPB 1000 milliGRAM(s) IV Intermittent <User Schedule>  vasopressin Infusion 0.04 Unit(s)/Min (6 mL/Hr) IV Continuous <Continuous>    Pertinent Labs: 12-22 Na138 mmol/L Glu 123 mg/dL<H> K+ 5.5 mmol/L<H> Cr  1.77 mg/dL<H> BUN 39.3 mg/dL<H> Phos 6.2 mg/dL<H> Alb 2.4 g/dL<L>    Skin: R cranioplasty incision, MAD/IAD, DTI R buttock, Stage 1 coccyx, Stage 2 R buttock PIs    Nutrition focused physical exam previously conducted - found signs of malnutrition [ ]absent [x]present    Subcutaneous fat loss: Mod [x] Orbital fat pads region, [x]Buccal fat region, [x]Triceps region, [x]Ribs region    Muscle wasting: Mod [x]Temples region, [x]Clavicle region, [x]Shoulder region, [ ]Scapula region, [ ]Interosseous region,  [ ]thigh region, [ ]Calf region    Estimated Needs:   [ ] no change since previous assessment  [x] recalculated:   2795-5663 kcals/day (based on 30-35 kcal/kg BW 55.8 kg)  78-89 g pro/day (based on 1.6-1.8 g/kg BW 55.8 kg)    Hospital Course:  82 y/o M with a h/o HTN, CAD s/p PCO, RCC s/p left nephrectomy, bladder CA, BPH, CHF, Vertigo, HLD, AAA s/p EVAR, parox Afib, h/o HIT, Alzheimer's, initially admitted on 11/10 s/p fall with Rt frontal IPH. Underwent R hemicraniectomy on 11/10 with drain placement. Hospital course complicated by AFib with RVR, Klebsiella PNA, hypoxic respiratory failure, LUE DVT and questionable increase in AAA size. Started on AC with waxing/waning mental status prompting head imaging with evidence of new bleed, so systemic A/C held. TTE showed mobile echodensity on aortic valve and strokes noted within multiple arterial territories, WHIT was recommended but family declined. Pt underwent cranioplasty on 12/7, and had subgaleal drain removed 12/10. ABx started for UTI. Pt re-upgraded to NSICU on 12/15 given worsening R frontal IPH, given Andexxa for reversal of NOAC, and subsequently downgraded on 12/17. Transferred to MICU earlier today after developing altered mentation with vomiting and aspiration of dark liquid stomach content. Subsequently developed septic shock state requiring dual IV vasopressor support. CT C/A/P suggestive of aspiration pneumonitis/pneumonia, questionable AAA endoleak, and diffuse colonic fecal retention with colonic distention. No evidence of mesenteric ischemia.    Current Nutrition Diagnosis: Pt remains at high nutrition risk secondary to severe (acute) protein calorie malnutrition related to inability to meet sufficient protein energy needs in setting of TBI, - R frontal IPH, SDH, tSAH; s/p R decompressive hemicraniectomy 11/10 as evidenced by physical signs of mod muscle/fat loss and meeting < 50% estimated energy needs > 5days, weight loss.  Pt previously on Minced and Moist diet with moderately thick liquids with fair-good po intake, now NPO secondary to intubation/sedation s/p coffee ground emesis on 12/21 and concern for aspiration pna. Pt was found to have fecal retention and colonic distention, NGT placed to suction, now having BMs, last BM today, bowel regimen remains in place. Propofol @ 4.37 ml/hr x 24 hrs = 115 kcals/day. Recommendations below:    Recommendations:   1. When medically feasible, start TF Vital 1.5 @ 10 cc/hr and increase by 10 cc q 4 hrs until goal rate 65 cc/hr x 18 hrs (1170 ml, 1755 kcals, 79 g pro, 889 ml free water); additional free water per medical teams discretion  2. Continue bowel regimen prn, monitor BMs and I/Os; as well as initiation and tolerance of TF regimen  3. Monitor kcals from propofol and TG levels  4. Rx: continue daily nephro-roma and folic acid  5. Daily weights and trends     Monitoring and Evaluation:   [ ] PO intake [x] Tolerance to diet prescription [X] Weights  [X] Follow up per protocol [X] Labs: Source: Patient [ ]  Family [ ]   other [x] EMR    Current Diet: Diet, NPO (12-21-23 @ 08:39)    Current Weight:   12/7 55.8 kg  11/23 65.8 kg  11/18 63.6 kg    % Weight Change: 12.3% weight loss x 1 month, weight is on downward trend, no edema per documentation     Pertinent Medications: MEDICATIONS  (STANDING):  folic acid 1 milliGRAM(s) Oral daily  hydrocortisone sodium succinate Injectable 100 milliGRAM(s) IV Push every 8 hours  lactated ringers. 1000 milliLiter(s) (75 mL/Hr) IV Continuous <Continuous>  Nephro-roma 1 Tablet(s) Oral daily  norepinephrine Infusion 0.7 MICROgram(s)/kG/Min (47.7 mL/Hr) IV Continuous <Continuous>  pantoprazole  Injectable 40 milliGRAM(s) IV Push every 12 hours  polyethylene glycol 3350 17 Gram(s) Oral daily  propofol Infusion 10 MICROgram(s)/kG/Min (4.37 mL/Hr) IV Continuous <Continuous>  senna 2 Tablet(s) Oral at bedtime  vancomycin  IVPB 1000 milliGRAM(s) IV Intermittent <User Schedule>  vasopressin Infusion 0.04 Unit(s)/Min (6 mL/Hr) IV Continuous <Continuous>    Pertinent Labs: 12-22 Na138 mmol/L Glu 123 mg/dL<H> K+ 5.5 mmol/L<H> Cr  1.77 mg/dL<H> BUN 39.3 mg/dL<H> Phos 6.2 mg/dL<H> Alb 2.4 g/dL<L>    Skin: R cranioplasty incision, MAD/IAD, DTI R buttock, Stage 1 coccyx, Stage 2 R buttock PIs    Nutrition focused physical exam previously conducted - found signs of malnutrition [ ]absent [x]present    Subcutaneous fat loss: Mod [x] Orbital fat pads region, [x]Buccal fat region, [x]Triceps region, [x]Ribs region    Muscle wasting: Mod [x]Temples region, [x]Clavicle region, [x]Shoulder region, [ ]Scapula region, [ ]Interosseous region,  [ ]thigh region, [ ]Calf region    Estimated Needs:   [ ] no change since previous assessment  [x] recalculated:   5035-2922 kcals/day (based on 30-35 kcal/kg BW 55.8 kg)  78-89 g pro/day (based on 1.6-1.8 g/kg BW 55.8 kg)    Hospital Course:  84 y/o M with a h/o HTN, CAD s/p PCO, RCC s/p left nephrectomy, bladder CA, BPH, CHF, Vertigo, HLD, AAA s/p EVAR, parox Afib, h/o HIT, Alzheimer's, initially admitted on 11/10 s/p fall with Rt frontal IPH. Underwent R hemicraniectomy on 11/10 with drain placement. Hospital course complicated by AFib with RVR, Klebsiella PNA, hypoxic respiratory failure, LUE DVT and questionable increase in AAA size. Started on AC with waxing/waning mental status prompting head imaging with evidence of new bleed, so systemic A/C held. TTE showed mobile echodensity on aortic valve and strokes noted within multiple arterial territories, WHIT was recommended but family declined. Pt underwent cranioplasty on 12/7, and had subgaleal drain removed 12/10. ABx started for UTI. Pt re-upgraded to NSICU on 12/15 given worsening R frontal IPH, given Andexxa for reversal of NOAC, and subsequently downgraded on 12/17. Transferred to MICU earlier today after developing altered mentation with vomiting and aspiration of dark liquid stomach content. Subsequently developed septic shock state requiring dual IV vasopressor support. CT C/A/P suggestive of aspiration pneumonitis/pneumonia, questionable AAA endoleak, and diffuse colonic fecal retention with colonic distention. No evidence of mesenteric ischemia.    Current Nutrition Diagnosis: Pt remains at high nutrition risk secondary to severe (acute) protein calorie malnutrition related to inability to meet sufficient protein energy needs in setting of TBI, - R frontal IPH, SDH, tSAH; s/p R decompressive hemicraniectomy 11/10 as evidenced by physical signs of mod muscle/fat loss and meeting < 50% estimated energy needs > 5days, weight loss.  Pt previously on Minced and Moist diet with moderately thick liquids with fair-good po intake, now NPO secondary to intubation/sedation s/p coffee ground emesis on 12/21 and concern for aspiration pna. Pt was found to have fecal retention and colonic distention, NGT placed to suction, now having BMs, last BM today, bowel regimen remains in place. Propofol @ 4.37 ml/hr x 24 hrs = 115 kcals/day. Recommendations below:    Recommendations:   1. When medically feasible, start TF Nepro @ 10 cc/hr and increase by 10 cc q 4 hrs until goal rate 55 cc/hr x 18 hrs (990 ml, 1782 kcals, 80 g pro, 723 ml free water); additional free water per medical teams discretion  2. Continue bowel regimen prn, monitor BMs and I/Os; as well as initiation and tolerance of TF regimen  3. Monitor kcals from propofol and TG levels  4. Rx: continue daily nephro-roma and folic acid  5. Daily weights and trends     Monitoring and Evaluation:   [ ] PO intake [x] Tolerance to diet prescription [X] Weights  [X] Follow up per protocol [X] Labs: Source: Patient [ ]  Family [ ]   other [x] EMR    Current Diet: Diet, NPO (12-21-23 @ 08:39)    Current Weight:   12/7 55.8 kg  11/23 65.8 kg  11/18 63.6 kg    % Weight Change: 12.3% weight loss x 1 month, weight is on downward trend, no edema per documentation     Pertinent Medications: MEDICATIONS  (STANDING):  folic acid 1 milliGRAM(s) Oral daily  hydrocortisone sodium succinate Injectable 100 milliGRAM(s) IV Push every 8 hours  lactated ringers. 1000 milliLiter(s) (75 mL/Hr) IV Continuous <Continuous>  Nephro-roma 1 Tablet(s) Oral daily  norepinephrine Infusion 0.7 MICROgram(s)/kG/Min (47.7 mL/Hr) IV Continuous <Continuous>  pantoprazole  Injectable 40 milliGRAM(s) IV Push every 12 hours  polyethylene glycol 3350 17 Gram(s) Oral daily  propofol Infusion 10 MICROgram(s)/kG/Min (4.37 mL/Hr) IV Continuous <Continuous>  senna 2 Tablet(s) Oral at bedtime  vancomycin  IVPB 1000 milliGRAM(s) IV Intermittent <User Schedule>  vasopressin Infusion 0.04 Unit(s)/Min (6 mL/Hr) IV Continuous <Continuous>    Pertinent Labs: 12-22 Na138 mmol/L Glu 123 mg/dL<H> K+ 5.5 mmol/L<H> Cr  1.77 mg/dL<H> BUN 39.3 mg/dL<H> Phos 6.2 mg/dL<H> Alb 2.4 g/dL<L>    Skin: R cranioplasty incision, MAD/IAD, DTI R buttock, Stage 1 coccyx, Stage 2 R buttock PIs    Nutrition focused physical exam previously conducted - found signs of malnutrition [ ]absent [x]present    Subcutaneous fat loss: Mod [x] Orbital fat pads region, [x]Buccal fat region, [x]Triceps region, [x]Ribs region    Muscle wasting: Mod [x]Temples region, [x]Clavicle region, [x]Shoulder region, [ ]Scapula region, [ ]Interosseous region,  [ ]thigh region, [ ]Calf region    Estimated Needs:   [ ] no change since previous assessment  [x] recalculated:   7501-4224 kcals/day (based on 30-35 kcal/kg BW 55.8 kg)  78-89 g pro/day (based on 1.6-1.8 g/kg BW 55.8 kg)    Hospital Course:  82 y/o M with a h/o HTN, CAD s/p PCO, RCC s/p left nephrectomy, bladder CA, BPH, CHF, Vertigo, HLD, AAA s/p EVAR, parox Afib, h/o HIT, Alzheimer's, initially admitted on 11/10 s/p fall with Rt frontal IPH. Underwent R hemicraniectomy on 11/10 with drain placement. Hospital course complicated by AFib with RVR, Klebsiella PNA, hypoxic respiratory failure, LUE DVT and questionable increase in AAA size. Started on AC with waxing/waning mental status prompting head imaging with evidence of new bleed, so systemic A/C held. TTE showed mobile echodensity on aortic valve and strokes noted within multiple arterial territories, WHIT was recommended but family declined. Pt underwent cranioplasty on 12/7, and had subgaleal drain removed 12/10. ABx started for UTI. Pt re-upgraded to NSICU on 12/15 given worsening R frontal IPH, given Andexxa for reversal of NOAC, and subsequently downgraded on 12/17. Transferred to MICU earlier today after developing altered mentation with vomiting and aspiration of dark liquid stomach content. Subsequently developed septic shock state requiring dual IV vasopressor support. CT C/A/P suggestive of aspiration pneumonitis/pneumonia, questionable AAA endoleak, and diffuse colonic fecal retention with colonic distention. No evidence of mesenteric ischemia.    Current Nutrition Diagnosis: Pt remains at high nutrition risk secondary to severe (acute) protein calorie malnutrition related to inability to meet sufficient protein energy needs in setting of TBI, - R frontal IPH, SDH, tSAH; s/p R decompressive hemicraniectomy 11/10 as evidenced by physical signs of mod muscle/fat loss and meeting < 50% estimated energy needs > 5days, weight loss.  Pt previously on Minced and Moist diet with moderately thick liquids with fair-good po intake, now NPO secondary to intubation/sedation s/p coffee ground emesis on 12/21 and concern for aspiration pna. Pt was found to have fecal retention and colonic distention, NGT placed to suction, now having BMs, last BM today, bowel regimen remains in place. Propofol @ 4.37 ml/hr x 24 hrs = 115 kcals/day. Recommendations below:    Recommendations:   1. When medically feasible, start TF Nepro @ 10 cc/hr and increase by 10 cc q 4 hrs until goal rate 55 cc/hr x 18 hrs (990 ml, 1782 kcals, 80 g pro, 723 ml free water); additional free water per medical teams discretion  2. Continue bowel regimen prn, monitor BMs and I/Os; as well as initiation and tolerance of TF regimen  3. Monitor kcals from propofol and TG levels  4. Rx: continue daily nephro-roma and folic acid  5. Daily weights and trends     Monitoring and Evaluation:   [ ] PO intake [x] Tolerance to diet prescription [X] Weights  [X] Follow up per protocol [X] Labs:

## 2023-12-22 NOTE — PROGRESS NOTE ADULT - ASSESSMENT
ASSESSMENT: 83 year old Male with PMHx HTN, CAD s/p stents, renal cell carcinoma status post left nephrectomy, bladder CA, BPH, CHF, LBBB, vertigo,  HLD, 5.5cm AAA without rupture post EVAR, paroxysmal A-fib on Eliquis, Plavix, and aspirin, early stage Alzheimer dementia, transferred from [facility] on 11/10/23 s/p unwitnessed fall with head strike at home, found by wife on floor at 8am. Patient brought to urgent care by wife and had 5 staples to posterior scalp but was instructed to go to nearest ED.  CT head at [place] showed R frontal IPH, SDH, SAH at 9:00. s/p Right craniectomy for ICH 11/10/23. Found with LUE DVT, on Eliquis. Downgraded to stepdown on 11/23. Neurology consulted 11/25 to evaluate secondary stroke found on MRI brain which was ordered to evaluate for infection. Re-upgraded to NSICU 11/26 for new acute anterior falx SDH, Eliquis and ASA held per Neurosurgery. Etiology of infarcts likely embolic as bilateral as well as suggestive findings of mobile echodensity on aortic valve. Currently pending family decision regarding WHIT for further stroke workup.   Called to see s/p RRT, now intubated, there was concern about his EEG results        NEURO:   -Intubtaed, with left hemiparesis and left neglect   - EEG findings likely due to right stroke/blood, irritible cortex, no seizures, but risk of seizures   Continue keppra 1000 mg bid    discussed with Dr Flores.    please recall as needed  Thank you for allowing me to participate in the care of your patient    Jaren Moreno MD, PhD   [phone]   ASSESSMENT: 83 year old Male with PMHx HTN, CAD s/p stents, renal cell carcinoma status post left nephrectomy, bladder CA, BPH, CHF, LBBB, vertigo,  HLD, 5.5cm AAA without rupture post EVAR, paroxysmal A-fib on Eliquis, Plavix, and aspirin, early stage Alzheimer dementia, transferred from [facility] on 11/10/23 s/p unwitnessed fall with head strike at home, found by wife on floor at 8am. Patient brought to urgent care by wife and had 5 staples to posterior scalp but was instructed to go to nearest ED.  CT head at [place] showed R frontal IPH, SDH, SAH at 9:00. s/p Right craniectomy for ICH 11/10/23. Found with LUE DVT, on Eliquis. Downgraded to stepdown on 11/23. Neurology consulted 11/25 to evaluate secondary stroke found on MRI brain which was ordered to evaluate for infection. Re-upgraded to NSICU 11/26 for new acute anterior falx SDH, Eliquis and ASA held per Neurosurgery. Etiology of infarcts likely embolic as bilateral as well as suggestive findings of mobile echodensity on aortic valve. Currently pending family decision regarding WHIT for further stroke workup.   Called to see s/p RRT, now intubated, there was concern about his EEG results        NEURO:   -Intubtaed, with left hemiparesis and left neglect   - EEG findings likely due to right stroke/blood, irritible cortex, no seizures, but risk of seizures   Continue keppra 1000 mg bid    discussed with Dr Flores.    please recall as needed  Thank you for allowing me to participate in the care of your patient    Jaren Moreno MD, PhD   [phone]

## 2023-12-22 NOTE — CHART NOTE - NSCHARTNOTEFT_GEN_A_CORE
Jamari was awake and alert during evaluation, measurements, and fitting of left large resting hand splint with 2 arm sleeves and left prafo with two socks. Wife present during fitting. Fit was good. Showed wife how to don and doff left resting hand splint and left prafo. Provided written and verbal instructions. [facility] [phone] Jamari was awake and alert during evaluation, measurements, and fitting of left large resting hand splint with 2 arm sleeves and left prafo with two socks. Wife present during fitting. Fit was good. Showed wife how to don and doff left resting hand splint and left prafo. Provided written and verbal instructions. [facility] [phone]

## 2023-12-22 NOTE — EEG REPORT - NS EEG TEXT BOX
[facility]   COMPREHENSIVE EPILEPSY CENTER   REPORT OF CONTINUOUS VIDEO EEG     [facility]: [address], [place] [address], Ph#: [phone]  [facility]: [id number], [place] [address], Ph#: [phone]  [facility]: [address] [place] [address], Ph#: [phone]    Patient Name: JAMARI BALL  Age and : 83y ([birth year])  MRN #: [id number]  Location: [place] [address]  Referring Physician: Jamari Flores    Start Time/Date:  on   End Time/Date: 08:00 on 23  Duration: 8H    _____________________________________________________________  STUDY INFORMATION    EEG Recording Technique:  The patient underwent continuous Video-EEG monitoring, using Telemetry System hardware on the XLTek Digital System. EEG and video data were stored on a computer hard drive with important events saved in digital archive files. The material was reviewed by a physician (electroencephalographer / epileptologist) on a daily basis. Taras and seizure detection algorithms were utilized and reviewed. An EEG Technician attended to the patient, and was available throughout daytime work hours.  The epilepsy center neurologist was available in person or on call 24-hours per day.    EEG Placement and Labeling of Electrodes:  The EEG was performed utilizing 20 channel referential EEG connections (coronal over temporal over parasagittal montage) using all standard 10-20 electrode placements with EKG, with additional electrodes placed in the inferior temporal region using the modified 10-10 montage electrode placements for elective admissions, or if deemed necessary. Recording was at a sampling rate of 256 samples per second per channel. Time synchronized digital video recording was done simultaneously with EEG recording. A low light infrared camera was used for low light recording.     _____________________________________________________________  HISTORY    Patient is a 83y old  Male who presents with a chief complaint of s/p unwitnessed fall with head strike (21 Dec 2023 20:07)      PERTINENT MEDICATION:  MEDICATIONS  (STANDING):  atorvastatin 80 milliGRAM(s) Oral at bedtime  chlorhexidine 2% Cloths 1 Application(s) Topical <User Schedule>  doxazosin 2 milliGRAM(s) Oral at bedtime  folic acid 1 milliGRAM(s) Oral daily  hydrocortisone sodium succinate Injectable 100 milliGRAM(s) IV Push every 8 hours  lactated ringers. 1000 milliLiter(s) (75 mL/Hr) IV Continuous <Continuous>  levETIRAcetam  IVPB 500 milliGRAM(s) IV Intermittent every 12 hours  memantine 5 milliGRAM(s) Oral two times a day  meropenem Injectable 1000 milliGRAM(s) IV Push every 8 hours  Nephro-roma 1 Tablet(s) Oral daily  norepinephrine Infusion 0.7 MICROgram(s)/kG/Min (47.7 mL/Hr) IV Continuous <Continuous>  pantoprazole  Injectable 40 milliGRAM(s) IV Push every 12 hours  polyethylene glycol 3350 17 Gram(s) Oral daily  propofol Infusion 10 MICROgram(s)/kG/Min (4.37 mL/Hr) IV Continuous <Continuous>  senna 2 Tablet(s) Oral at bedtime  vancomycin  IVPB 1000 milliGRAM(s) IV Intermittent <User Schedule>  vasopressin Infusion 0.04 Unit(s)/Min (6 mL/Hr) IV Continuous <Continuous>    _____________________________________________________________  STUDY INTERPRETATION    Findings: The background was continuous, spontaneously variable and reactive.   No posterior dominant rhythm seen.  Background predominantly consisted of theta, delta and faster activities.    Focal Slowing:   Near continuous theta/delta slowing in the right anterior head region.    Sleep Background:  Drowsiness and stage II sleep transients were not recorded.    Other Non-Epileptiform Findings:  Breach effect over the right hemisphere characterized by higher amplitude, sharply contoured waves and fast activities.    Interictal Epileptiform Activity:   Occasional right fronto-temporal sharp-wave discharges (max F8).    Events:  Clinical events: None recorded.  Seizures: None recorded.    Activation Procedures:   Hyperventilation was not performed.    Photic stimulation was not performed.     Artifacts:  Intermittent myogenic and movement artifacts were noted.    _____________________________________________________________  EEG SUMMARY/CLASSIFICATION    Abnormal EEG   - Right anterior slowing and sharp-wave discharges  - Moderate generalized slowing.  - Right hemispheric breach artifact.  _____________________________________________________________  EEG IMPRESSION/CLINICAL CORRELATE    Abnormal EEG study.  Potential epileptogenic focus in the right anterior head region with underlying cerebral dysfunction and evidence for overlying skull defect  Moderate nonspecific diffuse or multifocal cerebral dysfunction.   No seizure seen.  _____________________________________________________________    Brendon Chatman MD   of Neurology  Epilepsy/EEG Attending   [facility]   COMPREHENSIVE EPILEPSY CENTER   REPORT OF CONTINUOUS VIDEO EEG     [facility]: [address], [place] [address], Ph#: [phone]  [facility]: [phone], [place] [address], Ph#: [phone]  [facility]: [address] [place] [address], Ph#: [phone]    Patient Name: JAMARI BALL  Age and : 83y ([birth year])  MRN #: [id number]  Location: [place] [address]  Referring Physician: Jamari Flores    Start Time/Date:  on   End Time/Date: 08:00 on 23  Duration: 8H    _____________________________________________________________  STUDY INFORMATION    EEG Recording Technique:  The patient underwent continuous Video-EEG monitoring, using Telemetry System hardware on the XLTek Digital System. EEG and video data were stored on a computer hard drive with important events saved in digital archive files. The material was reviewed by a physician (electroencephalographer / epileptologist) on a daily basis. Taras and seizure detection algorithms were utilized and reviewed. An EEG Technician attended to the patient, and was available throughout daytime work hours.  The epilepsy center neurologist was available in person or on call 24-hours per day.    EEG Placement and Labeling of Electrodes:  The EEG was performed utilizing 20 channel referential EEG connections (coronal over temporal over parasagittal montage) using all standard 10-20 electrode placements with EKG, with additional electrodes placed in the inferior temporal region using the modified 10-10 montage electrode placements for elective admissions, or if deemed necessary. Recording was at a sampling rate of 256 samples per second per channel. Time synchronized digital video recording was done simultaneously with EEG recording. A low light infrared camera was used for low light recording.     _____________________________________________________________  HISTORY    Patient is a 83y old  Male who presents with a chief complaint of s/p unwitnessed fall with head strike (21 Dec 2023 20:07)      PERTINENT MEDICATION:  MEDICATIONS  (STANDING):  atorvastatin 80 milliGRAM(s) Oral at bedtime  chlorhexidine 2% Cloths 1 Application(s) Topical <User Schedule>  doxazosin 2 milliGRAM(s) Oral at bedtime  folic acid 1 milliGRAM(s) Oral daily  hydrocortisone sodium succinate Injectable 100 milliGRAM(s) IV Push every 8 hours  lactated ringers. 1000 milliLiter(s) (75 mL/Hr) IV Continuous <Continuous>  levETIRAcetam  IVPB 500 milliGRAM(s) IV Intermittent every 12 hours  memantine 5 milliGRAM(s) Oral two times a day  meropenem Injectable 1000 milliGRAM(s) IV Push every 8 hours  Nephro-roma 1 Tablet(s) Oral daily  norepinephrine Infusion 0.7 MICROgram(s)/kG/Min (47.7 mL/Hr) IV Continuous <Continuous>  pantoprazole  Injectable 40 milliGRAM(s) IV Push every 12 hours  polyethylene glycol 3350 17 Gram(s) Oral daily  propofol Infusion 10 MICROgram(s)/kG/Min (4.37 mL/Hr) IV Continuous <Continuous>  senna 2 Tablet(s) Oral at bedtime  vancomycin  IVPB 1000 milliGRAM(s) IV Intermittent <User Schedule>  vasopressin Infusion 0.04 Unit(s)/Min (6 mL/Hr) IV Continuous <Continuous>    _____________________________________________________________  STUDY INTERPRETATION    Findings: The background was continuous, spontaneously variable and reactive.   No posterior dominant rhythm seen.  Background predominantly consisted of theta, delta and faster activities.    Focal Slowing:   Near continuous theta/delta slowing in the right anterior head region.    Sleep Background:  Drowsiness and stage II sleep transients were not recorded.    Other Non-Epileptiform Findings:  Breach effect over the right hemisphere characterized by higher amplitude, sharply contoured waves and fast activities.    Interictal Epileptiform Activity:   Occasional right fronto-temporal sharp-wave discharges (max F8).    Events:  Clinical events: None recorded.  Seizures: None recorded.    Activation Procedures:   Hyperventilation was not performed.    Photic stimulation was not performed.     Artifacts:  Intermittent myogenic and movement artifacts were noted.    _____________________________________________________________  EEG SUMMARY/CLASSIFICATION    Abnormal EEG   - Right anterior slowing and sharp-wave discharges  - Moderate generalized slowing.  - Right hemispheric breach artifact.  _____________________________________________________________  EEG IMPRESSION/CLINICAL CORRELATE    Abnormal EEG study.  Potential epileptogenic focus in the right anterior head region with underlying cerebral dysfunction and evidence for overlying skull defect  Moderate nonspecific diffuse or multifocal cerebral dysfunction.   No seizure seen.  _____________________________________________________________    Brendon Chatman MD   of Neurology  Epilepsy/EEG Attending

## 2023-12-22 NOTE — PROGRESS NOTE ADULT - ASSESSMENT
83M PMH HTN, CAD s/p PCO, RCC s/p left nephrectomy, bladder Ca, BPH, CHF, Vertigo, HLD, AAA s/p EVAR, parox Afib, h/o HIT, Alzheimer transferred s/p fall with Rt frontal IPH s/p R hemicraniectomy 11/10/23, course c/b AFib with RVR, Klebsiella PNA, LUE DVT, aortic valve echodensity (family refused WHIT), R frontal IPH worsened and was given Andexxa for reveral of Eliquis, downgraded to medicine, and on 12/21/23 RRT called for unresponsiveness, aspiration episode, intubated, transferred to ICU in shock state requiring pressors    AMS - likely 2/2 sepsis, shock  Aspiration PNA - previously grew Klebsiella  Hypotension, shock  - Vanc/Meropenem  - Repeating sputum cultures  - F/u BCx that were drawn 12/20/23  - On Levophed, titrate MAP >65  - Placing CVC + A-lines  - Stress dose steroids Hydrocortisone 50mg IV Q6H  - Held antihypertensive agents    Distended abdomen  - In setting of aortic valve vegetation, concern for acute ischemia  - Trend lactate  - Serial abdominal exams  - Surgery consulted  - Pending CT abdomen/pelvis with IV contrast    AMS  - Possibly 2/2 toxic-metabolic encephalopathy in setting of septic shock  - Checking cvEEG r/o seizures  - If does not clinically improve will need to consider LP    Possible GIB - noted during intubation  - No A/C  - Starting Protonix gtt  - Will consult GI  - NPO    S/p intubation  - Full vent support  - Monitor Paw/Pplat  - Sedation to vent synchrony - is on Fent + Precedex, adding Propofol    R frontal IPH  S/p R hemicraniectomy 11/10/23  S/p cranioplasty 12/7, removal of subgaleal drain 12/10  - Repeat CTH without acute changes  - F/u with neurology/NSICU  - Sedation as above    Aortic valve echodensity  - F/u serial BCx, last drawn 12/20  - Family refused WHIT  - ABx as above - Vanc/Jaylen    UTI  - ABx as above    Diet - NPO  PPx - PPI gtt as above  Lines/Tubes - R femoral A-line + CVC placed 12/21  Code Status - Full; will contact family  Dispo - Admit to ICU      Jamari Flores M.D.  , Pulmonary & Critical Care Medicine  [facility]  Pulmonary and Sleep Medicine at [place]  [address]  [place], [place] [address]  T: [phone]  F: [phone]       83M PMH HTN, CAD s/p PCO, RCC s/p left nephrectomy, bladder Ca, BPH, CHF, Vertigo, HLD, AAA s/p EVAR, parox Afib, h/o HIT, Alzheimer transferred s/p fall with Rt frontal IPH s/p R hemicraniectomy 11/10/23, course c/b AFib with RVR, Klebsiella PNA, LUE DVT, aortic valve echodensity (family refused WHIT), R frontal IPH worsened and was given Andexxa for reveral of Eliquis, downgraded to medicine, and on 12/21/23 RRT called for unresponsiveness, aspiration episode, intubated, transferred to ICU in shock state requiring pressors    AMS - likely 2/2 sepsis, shock  Aspiration PNA - previously grew Klebsiella  Hypotension, shock  - Vanc/Meropenem  - Repeating sputum cultures  - F/u BCx that were drawn 12/20/23  - On Levophed, titrate MAP >65  - Placing CVC + A-lines  - Stress dose steroids Hydrocortisone 50mg IV Q6H  - Held antihypertensive agents    Distended abdomen  - In setting of aortic valve vegetation, concern for acute ischemia  - Trend lactate  - Serial abdominal exams  - Surgery consulted  - Pending CT abdomen/pelvis with IV contrast    AMS  - Possibly 2/2 toxic-metabolic encephalopathy in setting of septic shock  - Checking cvEEG r/o seizures  - If does not clinically improve will need to consider LP    Possible GIB - noted during intubation  - No A/C  - Starting Protonix gtt  - Will consult GI  - NPO    S/p intubation  - Full vent support  - Monitor Paw/Pplat  - Sedation to vent synchrony - is on Fent + Precedex, adding Propofol    R frontal IPH  S/p R hemicraniectomy 11/10/23  S/p cranioplasty 12/7, removal of subgaleal drain 12/10  - Repeat CTH without acute changes  - F/u with neurology/NSICU  - Sedation as above    Aortic valve echodensity  - F/u serial BCx, last drawn 12/20  - Family refused WHIT  - ABx as above - Vanc/Jaylen    UTI  - ABx as above    Diet - NPO  PPx - PPI gtt as above  Lines/Tubes - R femoral A-line + CVC placed 12/21  Code Status - Full; will contact family  Dispo - Admit to ICU      Jamari Flores M.D.  , Pulmonary & Critical Care Medicine  [facility]  Pulmonary and Sleep Medicine at [place]  [address]  [place] [address]  T: [phone]  F: [phone]       83M PMH HTN, CAD s/p PCO, RCC s/p left nephrectomy, bladder Ca, BPH, CHF, Vertigo, HLD, AAA s/p EVAR, parox Afib, h/o HIT, Alzheimer transferred s/p fall with Rt frontal IPH s/p R hemicraniectomy 11/10/23, course c/b AFib with RVR, Klebsiella PNA, LUE DVT, aortic valve echodensity (family refused WHIT), R frontal IPH worsened and was given Andexxa for reveral of Eliquis, downgraded to medicine, and on 12/21/23 RRT called for unresponsiveness, aspiration episode, intubated, transferred to ICU in shock state requiring pressors, developed distended abdomen, CTA without mesenteric ischemia, and EEG showed epileptogenic focus    AMS - likely 2/2 sepsis, shock  Aspiration PNA - previously grew Klebsiella  Hypotension, shock  - Vanc/Meropenem  - F/u sputum cultures, BCx  - BCx from 12/20 were NGTD  - On Levophed + Vaso, titrate MAP >65  - Stress dose steroids Hydrocortisone 100mg IV Q8H  - Held antihypertensive agents    Distended abdomen  - No evidence of mesenteric ischemia on CTA  - Serial abdominal exams  - Stool softeners    AMS  - Likely 2/2 toxic-metabolic encephalopathy in setting of septic shock  - C/w cvEEG  - No evidence of meningitis on LP performed 12/21/23    Coffee-ground emesis  Possible stress-induced gastritis  - Protonix BID  - GI consulted, no plan for endoscopy at this time  - No A/C  - Output from OGT is now bilious  - NPO    S/p intubation  - Full vent support  - Monitor Paw/Pplat  - Wean sedation to assess mentation  - Is on Propofol    R frontal IPH  S/p R hemicraniectomy 11/10/23  S/p cranioplasty 12/7, removal of subgaleal drain 12/10  - Repeat CTH without acute changes  - F/u with neurology/NSICU  - Sedation as above  - Epileptogenic focus noted on cvEEG - increased Keppra to 1gm Q12H  - Neurology to f/u    Aortic valve echodensity  - F/u serial BCx, last drawn 12/20 was negative  - Per documentation, family refused WHIT  - ABx as above - Vanc/Jaylen    AAA - s/p repair  Endoleak near the proximal stent graft attachment  - The aneurysm sac is stable  - Per vascular surgery no intervention indicated     UTI  - ABx as above    Diet - NPO  PPx - PPI IV  Lines/Tubes - R femoral A-line + CVC placed 12/21  Code Status - Continuing to discuss GOC with family; HCP is daughter Jennifer  Displyric - C/w care in ICU      Jamari Flores M.D.  , Pulmonary & Critical Care Medicine  [facility]  Pulmonary and Sleep Medicine at [place]  [address]  [place] [address]  T: [phone]  F: [phone]       83M PMH HTN, CAD s/p PCO, RCC s/p left nephrectomy, bladder Ca, BPH, CHF, Vertigo, HLD, AAA s/p EVAR, parox Afib, h/o HIT, Alzheimer transferred s/p fall with Rt frontal IPH s/p R hemicraniectomy 11/10/23, course c/b AFib with RVR, Klebsiella PNA, LUE DVT, aortic valve echodensity (family refused WHIT), R frontal IPH worsened and was given Andexxa for reveral of Eliquis, downgraded to medicine, and on 12/21/23 RRT called for unresponsiveness, aspiration episode, intubated, transferred to ICU in shock state requiring pressors, developed distended abdomen, CTA without mesenteric ischemia, and EEG showed epileptogenic focus    AMS - likely 2/2 sepsis, shock  Aspiration PNA - previously grew Klebsiella  Hypotension, shock  - Vanc/Meropenem  - F/u sputum cultures, BCx  - BCx from 12/20 were NGTD  - On Levophed + Vaso, titrate MAP >65  - Stress dose steroids Hydrocortisone 100mg IV Q8H  - Held antihypertensive agents    Distended abdomen  - No evidence of mesenteric ischemia on CTA  - Serial abdominal exams  - Stool softeners    AMS  - Likely 2/2 toxic-metabolic encephalopathy in setting of septic shock  - C/w cvEEG  - No evidence of meningitis on LP performed 12/21/23    Coffee-ground emesis  Possible stress-induced gastritis  - Protonix BID  - GI consulted, no plan for endoscopy at this time  - No A/C  - Output from OGT is now bilious  - NPO    S/p intubation  - Full vent support  - Monitor Paw/Pplat  - Wean sedation to assess mentation  - Is on Propofol    R frontal IPH  S/p R hemicraniectomy 11/10/23  S/p cranioplasty 12/7, removal of subgaleal drain 12/10  - Repeat CTH without acute changes  - F/u with neurology/NSICU  - Sedation as above  - Epileptogenic focus noted on cvEEG - increased Keppra to 1gm Q12H  - Neurology to f/u    Aortic valve echodensity  - F/u serial BCx, last drawn 12/20 was negative  - Per documentation, family refused WHIT  - ABx as above - Vanc/Jaylen    AAA - s/p repair  Endoleak near the proximal stent graft attachment  - The aneurysm sac is stable  - Per vascular surgery no intervention indicated     UTI  - ABx as above    Diet - NPO  PPx - PPI IV  Lines/Tubes - R femoral A-line + CVC placed 12/21  Code Status - Continuing to discuss GOC with family; HCP is daughter Jennifer  Displyric - C/w care in ICU      Jamari Flores M.D.  , Pulmonary & Critical Care Medicine  [facility]  Pulmonary and Sleep Medicine at [place]  [address]  [place], [place] [address]  T: [phone]  F: [phone]

## 2023-12-22 NOTE — PROGRESS NOTE ADULT - SUBJECTIVE AND OBJECTIVE BOX
Chief Complaint: This is a 83y old man patient being seen in follow-up consultation for coffee ground emesis.    Interval HPI / 24H events:  Patient seen and evaluated at bedside, intubated, wife at the bedside. Reported 2 BM overnight. No melena or coffee ground emesis reported. Hemoglobin remain stable, CTA abd showed diffuse colonic fecal retention with colonic distention.       Review of Systems:  Unable to obtain, intubated     PAST MEDICAL/SURGICAL HISTORY:  HTN  dx 2008    Bladder Cancer (ICD9 188.9)  TCC, dx 1999    Hyperlipemia (ICD9 272.4)  dx 2008    Lt Renal Neoplasm  left - s/p left nephrectomy    Hx antineoplastic chemotherapy ([facility] 2012)    Left bundle branch block    Vertigo    Heel spur, left    Abdominal aortic aneurysm (AAA)  5.5 cm    BPH (benign prostatic hyperplasia)    Renal mass, right  current - following with Dr Santiago    CAD (coronary artery disease)    Bladder cancer, primary, with metastasis from bladder to other site  Left kidney    Acute renal failure, unspecified acute renal failure type    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)    Abdominal aortic aneurysm (AAA) without rupture    Congestive heart failure, unspecified chronicity, unspecified heart failure type    Hypertension    History of abdominal aortic aneurysm (AAA)    CAD (coronary artery disease)    Alzheimers disease    urethral Stent 7/2008  stent placement and removal    S/P Cystoscopy  bladder polyps removal multiple times (since 1999), 6/10 , 10/2011 & 10/17/2011, 5/12, 11/2012, last 5/13/13, 12/2013, 7/2014    S/P Tonsillectomy    History of Lt  Nephrectomy  6/10 - left    History of cystoscopy  April 2015    H/O abdominal aortic aneurysm repair    S/P AAA repair    History of nephrectomy  Left    Presence of stent in LAD coronary artery    History of heart artery stent  DIONICIO      MEDICATIONS  (STANDING):  atorvastatin 80 milliGRAM(s) Oral at bedtime  chlorhexidine 2% Cloths 1 Application(s) Topical <User Schedule>  doxazosin 2 milliGRAM(s) Oral at bedtime  folic acid 1 milliGRAM(s) Oral daily  hydrocortisone sodium succinate Injectable 100 milliGRAM(s) IV Push every 8 hours  lactated ringers. 1000 milliLiter(s) (75 mL/Hr) IV Continuous <Continuous>  levETIRAcetam  IVPB 500 milliGRAM(s) IV Intermittent every 12 hours  memantine 5 milliGRAM(s) Oral two times a day  meropenem Injectable 1000 milliGRAM(s) IV Push every 8 hours  Nephro-roma 1 Tablet(s) Oral daily  norepinephrine Infusion 0.7 MICROgram(s)/kG/Min (47.7 mL/Hr) IV Continuous <Continuous>  pantoprazole  Injectable 40 milliGRAM(s) IV Push every 12 hours  polyethylene glycol 3350 17 Gram(s) Oral daily  propofol Infusion 10 MICROgram(s)/kG/Min (4.37 mL/Hr) IV Continuous <Continuous>  senna 2 Tablet(s) Oral at bedtime  vancomycin  IVPB 1000 milliGRAM(s) IV Intermittent <User Schedule>  vasopressin Infusion 0.04 Unit(s)/Min (6 mL/Hr) IV Continuous <Continuous>    MEDICATIONS  (PRN):  acetaminophen     Tablet .. 650 milliGRAM(s) Oral every 6 hours PRN Temp greater or equal to 38C (100.4F), Mild Pain (1 - 3)  artificial tears (preservative free) Ophthalmic Solution 1 Drop(s) Both EYES every 6 hours PRN Dry Eyes    penicillin (Rash)  heparin (Other (Severe))  penicillin (Hives)  Cipro (Other)  Levaquin (Other)  heparin (Other)    T(C): 38.4 (12-22-23 @ 08:00), Max: 38.7 (12-21-23 @ 15:30)  HR: 119 (12-22-23 @ 08:53) (72 - 128)  BP: 119/41 (12-21-23 @ 13:15) (68/47 - 133/69)  RR: 26 (12-22-23 @ 08:45) (16 - 42)  SpO2: 100% (12-22-23 @ 08:53) (88% - 100%)  Mode: AC/ CMV (Assist Control/ Continuous Mandatory Ventilation)  RR (machine): 12  TV (machine): 450  FiO2: 40  PEEP: 8  ITime: 1  MAP: 13  PIP: 22    I&O's Summary    21 Dec 2023 07:01  -  22 Dec 2023 07:00  --------------------------------------------------------  IN: 5528.3 mL / OUT: 905 mL / NET: 4623.3 mL      PHYSICAL EXAM:    Constitutional: No acute distress, intubated  Neuro: Intubated, open eyes mague verbal stimuli follows simple commands   HEENT: anicteric sclerae  CV: regular rate, regular rhythm  Pulm/chest: lung sounds diminished bilaterally, no accessory muscle use noted  Abd: softly distended, hypoactive bowel sounds. No rigidity, rebound tenderness, or guarding  Ext: no edema  Skin: warm, no jaundice   Psych: calm, cooperative      LABS:  ABG - ( 22 Dec 2023 02:43 )  pH, Arterial: 7.380 pH, Blood: x     /  pCO2: 31    /  pO2: 173   / HCO3: 18    / Base Excess: -6.8  /  SaO2: 99.6                         8.7    7.72  )-----------( 242      ( 12-22 @ 01:45 )             25.7                8.7    6.50  )-----------( 290      ( 12-21 @ 16:20 )             26.0                9.8    3.58  )-----------( 338      ( 12-21 @ 12:15 )             29.5                9.5    12.10 )-----------( 350      ( 12-21 @ 05:56 )             29.0                8.7    9.94  )-----------( 266      ( 12-20 @ 07:32 )             27.2       12-22    138  |  105  |  39.3<H>  ----------------------------<  123<H>  5.5<H>   |  19.0<L>  |  1.77<H>    Ca    8.7      22 Dec 2023 01:45  Phos  6.2     12-22  Mg     1.9     12-22    TPro  5.3<L>  /  Alb  2.4<L>  /  TBili  0.3<L>  /  DBili  x   /  AST  68<H>  /  ALT  39  /  AlkPhos  104  12-22    LIVER FUNCTIONS - ( 22 Dec 2023 01:45 )  Alb: 2.4 g/dL / Pro: 5.3 g/dL / ALK PHOS: 104 U/L / ALT: 39 U/L / AST: 68 U/L / GGT: x               PT/INR - ( 22 Dec 2023 01:45 )   PT: 14.1 sec;   INR: 1.28 ratio         PTT - ( 22 Dec 2023 01:45 )  PTT:33.5 sec      Culture - CSF with Gram Stain (collected 21 Dec 2023 19:44)  Source: .CSF CSF  Gram Stain (22 Dec 2023 02:07):    polymorphonuclear leukocytes seen    No organisms seen    by cytocentrifuge    Culture - Sputum (collected 21 Dec 2023 10:10)  Source: ET Tube ET Tube  Gram Stain (21 Dec 2023 23:16):    Few polymorphonuclear leukocytes per low power field    Rare Squamous epithelial cells per low power field    Moderate Yeast like cells per oil power field    Moderate Gram Positive Rods per oil power field    Few Gram positive cocci in pairs per oil powerfield    Rare Gram Negative Rods per oil power field    Culture - Blood (collected 20 Dec 2023 07:32)  Source: .Blood Blood  Preliminary Report (21 Dec 2023 13:02):    No growth at 24 hours    Culture - Blood (collected 20 Dec 2023 07:25)  Source: .Blood Blood  Preliminary Report (21 Dec 2023 13:02):    No growth at 24 hours      < from: CT Angio Abdomen and Pelvis w/ IV Cont (12.21.23 @ 17:13) >  FINDINGS:    CHEST:    LUNGS AND LARGE AIRWAYS:  PLEURA:    Endotracheal tube, tip above the level michael.  The central airways are patent.    There are diffuse bilateral upper and lower lobe groundglass   centrilobular and tree-in-bud nodular opacities  There are bibasilar lower lobe airspace consolidations and left lingula   airspace consolidation.    There is a trace left pleural effusion.    VESSELS:  Atherosclerotic changes thoracic aorta with coronary artery calcification   and/or stent.  No central pulmonary embolism is noted.    HEART:   Heart size is normal.  Aortic and mitral valvular calcification. No pericardial effusion.    MEDIASTINUM AND CHERY:  No enlarged lymphadenopathy.    CHEST WALL AND LOWER NECK: Within normal limits.    ABDOMEN AND PELVIS:    LIVER: Within normal limits.  BILE DUCTS: Normal caliber.  GALLBLADDER: Dependent sludge or gallstones.  SPLEEN: Small, peripheral wedge-shaped defect posterior spleen, could   reflect infarct.  PANCREAS: Within normal limits.  ADRENALS: Within normal limits.  KIDNEYS/URETERS:  Left nephrectomy.  Multiple indeterminate right renal hypodense lesions.  No hydronephrosis.    BLADDER: Collapsed around Vallecillo catheter.  REPRODUCTIVE ORGANS: Mildly prominent prostate gland.    BOWEL:  Nasogastric tube within stomach which is nondistended.  There is a distended stomach to  Air filled colon extending to the rectum without bowel obstruction.  Rectal probe is present.  No extraluminal gas or congenital fluid collection.   PERITONEUM: No ascites.    VESSELS:  Aortobiiliac stent grafting right renal artery graft.    The native aneurysm sac measures 7.1 cm, stable from prior exam.  There is suggestion of contrast within the aneurysm sac near the proximal   stent graft attachment on arterial phase imaging, which becomes more   pronounced on delayed phase imaging (5:62, 63 and  10:114,115).    There is a dilated left common iliac artery measuring, 2 cm.  The iliac arteries are patent.    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  The superior mesenteric artery is patent.  The superior mesenteric vein and portal venous confluence is patent.    RETROPERITONEUM/LYMPH NODES:  No enlarged lymphadenopathy.  No retroperitoneal hematoma.    ABDOMINAL WALL:  Small bilateral fat-containing inguinal hernias.  Right femoral venous catheter.    BONES: Degenerative changes.    IMPRESSION:    Diffuse groundglass centrilobular and tree-in-bud nodular opacities which   is suggestive of infectious or aspiration bronchiolitis.    Bilateral lower lobe airspace consolidations may represent pneumonia   and/or atelectasis.    Findings suggestive of endoleak near the proximal stent graft attachment;   evaluation is somewhat limited without noncontrast exam.  Stable size of the aneurysm sac.    Diffuse colonic fecal retention with colonic distention.  No CT evidence for acute thromboembolic mesenteric ischemia.    Findings were discussed by telephone with Dr. Soriano at the time of   interpretation on 12/21/2023.    Other findings as discussed above.    < end of copied text >

## 2023-12-22 NOTE — PROGRESS NOTE ADULT - NS ATTEND AMEND GEN_ALL_CORE FT
GI following for coffee ground emesis, HGB fell but no further overt GIB. Continue PPI. Avoid nsaids. Given his clinical status will continue conservative measures at this time. CT reviewed with stool burden continue bowel regimen

## 2023-12-22 NOTE — PROGRESS NOTE ADULT - SUBJECTIVE AND OBJECTIVE BOX
[facility]                                     [facility] at [place]                                 Josh West, & Viral                                  [address]                                        [place], [place], [address]                                             [phone]    HPI:  83 year old Male with PMHx HTN, CAD s/p stents, renal cell carcinoma status post left nephrectomy, bladder CA, BPH, CHF, LBBB, vertigo,  HLD, 5.5cm AAA without rupture post EVAR, paroxysmal A-fib on Eliquis, Plavix, and aspirin, early stage Alzheimer dementia, transferred from [facility] on 11/10/23 s/p unwitnessed fall with head strike at home, found by wife on floor at 8am. Patient brought to urgent care by wife and had 5 staples to posterior scalp but was instructed to go to nearest ED.  CT head at [place] showed R frontal IPH, SDH, SAH at 9:00. s/p Right craniectomy for ICH 11/10/23. Found with LUE DVT, on Eliquis. Downgraded to stepdown on 11/23. Neurology consulted 11/25 to evaluate secondary stroke found on MRI brain. Re-upgraded to NSICU 11/26 for new acute anterior falx SDH.    Interval history: asked to reevaluate due to EEG, s/p RRT yesterday for hypotension, unresponsive, s/p intubation    Review of systems (neurology): unable to obtain    MEDICATIONS  (STANDING):  atorvastatin 80 milliGRAM(s) Oral at bedtime  chlorhexidine 2% Cloths 1 Application(s) Topical <User Schedule>  doxazosin 2 milliGRAM(s) Oral at bedtime  folic acid 1 milliGRAM(s) Oral daily  hydrocortisone sodium succinate Injectable 100 milliGRAM(s) IV Push every 8 hours  lactated ringers. 1000 milliLiter(s) (75 mL/Hr) IV Continuous <Continuous>  levETIRAcetam   Injectable 1000 milliGRAM(s) IV Push every 12 hours  memantine 5 milliGRAM(s) Oral two times a day  Nephro-roma 1 Tablet(s) Oral daily  norepinephrine Infusion 0.7 MICROgram(s)/kG/Min (47.7 mL/Hr) IV Continuous <Continuous>  pantoprazole  Injectable 40 milliGRAM(s) IV Push every 12 hours  polyethylene glycol 3350 17 Gram(s) Oral daily  propofol Infusion 10 MICROgram(s)/kG/Min (4.37 mL/Hr) IV Continuous <Continuous>  senna 2 Tablet(s) Oral at bedtime  vancomycin  IVPB 1000 milliGRAM(s) IV Intermittent <User Schedule>  vasopressin Infusion 0.04 Unit(s)/Min (6 mL/Hr) IV Continuous <Continuous>    MEDICATIONS  (PRN):  acetaminophen     Tablet .. 650 milliGRAM(s) Oral every 6 hours PRN Temp greater or equal to 38C (100.4F), Mild Pain (1 - 3)  artificial tears (preservative free) Ophthalmic Solution 1 Drop(s) Both EYES every 6 hours PRN Dry Eyes      Vital Signs Last 24 Hrs  T(C): 38.1 (22 Dec 2023 16:00), Max: 38.6 (22 Dec 2023 10:00)  T(F): 100.6 (22 Dec 2023 16:00), Max: 101.5 (22 Dec 2023 10:00)  HR: 116 (22 Dec 2023 16:00) (72 - 142)  BP: --  BP(mean): --  RR: 27 (22 Dec 2023 16:00) (16 - 36)  SpO2: 100% (22 Dec 2023 16:00) (100% - 100%)    Parameters below as of 22 Dec 2023 16:00  Patient On (Oxygen Delivery Method): ventilator    O2 Concentration (%): 40    Detailed Neurologic Exam:    Mental status: The patient is intubated/sedated and not speaking.  Unable to assess orientation or language.    Cranial nerves: Pupils equal and react symmetrically to light. There is no  blink to threat. Extraocular motion is full with dolls eyes maneuvers. There is no ptosis. Facial sensation is not able to be assessed. Facial musculature is grossly symmetric.  Unable to assess palate, shoulder and tongue.    Motor: There is normal bulk and tone.  There is no tremor.  moves right to stimuli, left weakness    Sensation: Grimace to pinch in 4 extremities    Reflexes: muted throughout and plantar responses are equivocal.    Cerebellar: unable to assess  dysmetria on finger to nose testing.    Gait: unable to assess due to condition  LABS:                                   7.3    10.18 )-----------( 228      ( 22 Dec 2023 13:00 )             22.7     12-22    133<L>  |  101  |  43.7<H>  ----------------------------<  125<H>  4.9   |  18.0<L>  |  1.69<H>    Ca    8.5      22 Dec 2023 13:00  Phos  5.6     12-22  Mg     1.9     12-22    TPro  5.3<L>  /  Alb  2.4<L>  /  TBili  0.3<L>  /  DBili  x   /  AST  68<H>  /  ALT  39  /  AlkPhos  104  12-22    LIVER FUNCTIONS - ( 22 Dec 2023 01:45 )  Alb: 2.4 g/dL / Pro: 5.3 g/dL / ALK PHOS: 104 U/L / ALT: 39 U/L / AST: 68 U/L / GGT: x           PT/INR - ( 22 Dec 2023 01:45 )   PT: 14.1 sec;   INR: 1.28 ratio         PTT - ( 22 Dec 2023 01:45 )  PTT:33.5 sec    _____________________________________________________________  EEG SUMMARY/CLASSIFICATION 12/21-12/22/23:    Abnormal EEG   - Right anterior slowing and sharp-wave discharges  - Moderate generalized slowing.  - Right hemispheric breach artifact.  _____________________________________________________________  EEG IMPRESSION/CLINICAL CORRELATE    Abnormal EEG study.  Potential epileptogenic focus in the right anterior head region with underlying cerebral dysfunction and evidence for overlying skull defect  Moderate nonspecific diffuse or multifocal cerebral dysfunction.   No seizure seen.  _____________________________________________________________    RADIOLOGY & ADDITIONAL STUDIES (independently reviewed unless otherwise noted):  CT Head No Cont (12.22.23 @ 03:51)   IMPRESSION:  Interval decrease in size of the simple density fluid collection, likely   either a pseudomeningocele or seroma, superficial to the right-sided   cranioplasty.  No other significant change.        CT Head No Cont (11.27.23 @ 10:32) >  FINDINGS: Redemonstrated hemorrhagic focus along the anterior falx, minimally increased in size when compared prior imaging. Surrounding edematous change. Redemonstrated territory of infarction in the right MCA distribution. Scattered areas of increased attenuation along the cortex of the affected territory, likely a combination of residual hemorrhage, petechial hemorrhages, and laminar necrosis. Overall findings grossly stable compared to prior imaging. Areas of decreased attenuation scattered throughout the deep and periventricular white matter, compatible with chronic small vessel disease. Mild central parenchymal volume loss. Ventricular size grossly stable without she evidence of hydrocephalus.    US Duplex Carotid Arteries Complete, Bilateral (11.26.23 @ 18:34) >  IMPRESSION: No significant hemodynamic stenosis of the right carotid artery. Over 70% left internal carotid artery stenosis.    MR Head No Cont (11.13.23 @ 21:36) >  IMPRESSION:   RIGHT frontoparietal craniectomy with underlying intracranial hemorrhage and edema within the RIGHT frontal lobe. Overlying small RIGHT subdural hemorrhage is also unchanged. Scant   hemorrhage in the dependent portions of the BILATERAL lateral ventricles and minimal subarachnoid hemorrhage seen in the BILATERAL frontal and parietal lobes. Moderate periventricular and deep white matter ischemia is noted. Encephalomalacia and gliosis seen in the anterior frontal lobes bilaterally.Tiny acute lacunar infarction in the LEFT cerebellum and tiny acute cortical infarction in the RIGHT parietal lobe. Restricted diffusion also noted about the surgical cavity suggesting infarcted parenchyma.  --- End of Report ---    TTE Echo Complete w/o Contrast w/ Doppler (11.11.23 @ 08:58) >  Summary:   1. Endocardial visualization was enhanced with intravenous echo contrast.   2. Normal global left ventricular systolic function.   3. There is mild concentric left ventricular hypertrophy.   4. Left ventricular ejection fraction, by visual estimation, is 60 to 65%.   5. Spectral Doppler shows impaired relaxation pattern of left ventricular myocardial filling (Grade I diastolic dysfunction).   6. Elevated mean left atrial pressure.   7. Mildly enlarged right ventricle.   8. Normal left atrial size.   9. Normal right atrialsize.  10. Moderate paradoxial low gradient aortic stenosis.  11. The Dimesionless Index value is .39.  12. Moderately calcified aortic valve with mobile echodensity poorly visualized. May represent artifact vs mobile calcification vs vegetation. Consider WHIT if clinically indicated.  13. Mild aortic regurgitation.  14. Moderate thickening and calcification of the anterior and posterior mitral valve leaflets.  15. Mild tricuspid regurgitation.  < end of copied text >      CT Angio Head w/ IV Cont / CT Perfusion (11.10.23 @ 11:45) >  CTA BRAIN:  1.  No evidence of intracranial aneurysm, critical stenosis, or abrupt cut off of intraluminal contrast.  2.  No distinct opacification of the right sigmoid sinus. Although this is not a designated CTV study, an obstructive thrombus is not entirely excluded.    CTA NECK:  1.  Atherosclerotic calcification and plaque in the left carotid bulb/proximal ICA resulting in severe luminal narrowing. Recommend Doppler imaging for further evaluation of flow.  2.  Atherosclerotic calcification in the right vertebral artery ostium resulting in moderate severe luminal narrowing.  3.  Nodularity to the bilateral lobes of the thyroid. Consider nonemergent thyroid ultrasound for further characterization if clinically indicated.    CT PERFUSION:  1.  Increased CBF and CBV values in the anterior right MCA distribution, correlating with the known hemorrhagic infarction.  2.  Slightly increased Tmax values about the periphery of the infarction which may correlate with an ischemic penumbra.  --- End of Report ---    CT Brain Stroke Protocol (11.10.23 @ 09:19) >  IMPRESSION: Acute 6 cm right lateral frontal lobe parenchymal hemorrhage. Additional multifocal subarachnoid and subdural hemorrhage as described above. No cerebral herniation. Underlying mass cannot be ruled out. Recommend MRI brain with IV contrast when clinically feasible.                            [facility]                                     Neurology at [place]                                 Josh West, & Viral                                  [address]                                        [place], [place], [address]                                             [phone]    HPI:  83 year old Male with PMHx HTN, CAD s/p stents, renal cell carcinoma status post left nephrectomy, bladder CA, BPH, CHF, LBBB, vertigo,  HLD, 5.5cm AAA without rupture post EVAR, paroxysmal A-fib on Eliquis, Plavix, and aspirin, early stage Alzheimer dementia, transferred from [facility] on 11/10/23 s/p unwitnessed fall with head strike at home, found by wife on floor at 8am. Patient brought to urgent care by wife and had 5 staples to posterior scalp but was instructed to go to nearest ED.  CT head at [place] showed R frontal IPH, SDH, SAH at 9:00. s/p Right craniectomy for ICH 11/10/23. Found with LUE DVT, on Eliquis. Downgraded to stepdown on 11/23. Neurology consulted 11/25 to evaluate secondary stroke found on MRI brain. Re-upgraded to NSICU 11/26 for new acute anterior falx SDH.    Interval history: asked to reevaluate due to EEG, s/p RRT yesterday for hypotension, unresponsive, s/p intubation    Review of systems (neurology): unable to obtain    MEDICATIONS  (STANDING):  atorvastatin 80 milliGRAM(s) Oral at bedtime  chlorhexidine 2% Cloths 1 Application(s) Topical <User Schedule>  doxazosin 2 milliGRAM(s) Oral at bedtime  folic acid 1 milliGRAM(s) Oral daily  hydrocortisone sodium succinate Injectable 100 milliGRAM(s) IV Push every 8 hours  lactated ringers. 1000 milliLiter(s) (75 mL/Hr) IV Continuous <Continuous>  levETIRAcetam   Injectable 1000 milliGRAM(s) IV Push every 12 hours  memantine 5 milliGRAM(s) Oral two times a day  Nephro-roma 1 Tablet(s) Oral daily  norepinephrine Infusion 0.7 MICROgram(s)/kG/Min (47.7 mL/Hr) IV Continuous <Continuous>  pantoprazole  Injectable 40 milliGRAM(s) IV Push every 12 hours  polyethylene glycol 3350 17 Gram(s) Oral daily  propofol Infusion 10 MICROgram(s)/kG/Min (4.37 mL/Hr) IV Continuous <Continuous>  senna 2 Tablet(s) Oral at bedtime  vancomycin  IVPB 1000 milliGRAM(s) IV Intermittent <User Schedule>  vasopressin Infusion 0.04 Unit(s)/Min (6 mL/Hr) IV Continuous <Continuous>    MEDICATIONS  (PRN):  acetaminophen     Tablet .. 650 milliGRAM(s) Oral every 6 hours PRN Temp greater or equal to 38C (100.4F), Mild Pain (1 - 3)  artificial tears (preservative free) Ophthalmic Solution 1 Drop(s) Both EYES every 6 hours PRN Dry Eyes      Vital Signs Last 24 Hrs  T(C): 38.1 (22 Dec 2023 16:00), Max: 38.6 (22 Dec 2023 10:00)  T(F): 100.6 (22 Dec 2023 16:00), Max: 101.5 (22 Dec 2023 10:00)  HR: 116 (22 Dec 2023 16:00) (72 - 142)  BP: --  BP(mean): --  RR: 27 (22 Dec 2023 16:00) (16 - 36)  SpO2: 100% (22 Dec 2023 16:00) (100% - 100%)    Parameters below as of 22 Dec 2023 16:00  Patient On (Oxygen Delivery Method): ventilator    O2 Concentration (%): 40    Detailed Neurologic Exam:    Mental status: The patient is intubated/sedated and not speaking.  Unable to assess orientation or language.    Cranial nerves: Pupils equal and react symmetrically to light. There is no  blink to threat. Extraocular motion is full with dolls eyes maneuvers. There is no ptosis. Facial sensation is not able to be assessed. Facial musculature is grossly symmetric.  Unable to assess palate, shoulder and tongue.    Motor: There is normal bulk and tone.  There is no tremor.  moves right to stimuli, left weakness    Sensation: Grimace to pinch in 4 extremities    Reflexes: muted throughout and plantar responses are equivocal.    Cerebellar: unable to assess  dysmetria on finger to nose testing.    Gait: unable to assess due to condition  LABS:                                   7.3    10.18 )-----------( 228      ( 22 Dec 2023 13:00 )             22.7     12-22    133<L>  |  101  |  43.7<H>  ----------------------------<  125<H>  4.9   |  18.0<L>  |  1.69<H>    Ca    8.5      22 Dec 2023 13:00  Phos  5.6     12-22  Mg     1.9     12-22    TPro  5.3<L>  /  Alb  2.4<L>  /  TBili  0.3<L>  /  DBili  x   /  AST  68<H>  /  ALT  39  /  AlkPhos  104  12-22    LIVER FUNCTIONS - ( 22 Dec 2023 01:45 )  Alb: 2.4 g/dL / Pro: 5.3 g/dL / ALK PHOS: 104 U/L / ALT: 39 U/L / AST: 68 U/L / GGT: x           PT/INR - ( 22 Dec 2023 01:45 )   PT: 14.1 sec;   INR: 1.28 ratio         PTT - ( 22 Dec 2023 01:45 )  PTT:33.5 sec    _____________________________________________________________  EEG SUMMARY/CLASSIFICATION 12/21-12/22/23:    Abnormal EEG   - Right anterior slowing and sharp-wave discharges  - Moderate generalized slowing.  - Right hemispheric breach artifact.  _____________________________________________________________  EEG IMPRESSION/CLINICAL CORRELATE    Abnormal EEG study.  Potential epileptogenic focus in the right anterior head region with underlying cerebral dysfunction and evidence for overlying skull defect  Moderate nonspecific diffuse or multifocal cerebral dysfunction.   No seizure seen.  _____________________________________________________________    RADIOLOGY & ADDITIONAL STUDIES (independently reviewed unless otherwise noted):  CT Head No Cont (12.22.23 @ 03:51)   IMPRESSION:  Interval decrease in size of the simple density fluid collection, likely   either a pseudomeningocele or seroma, superficial to the right-sided   cranioplasty.  No other significant change.        CT Head No Cont (11.27.23 @ 10:32) >  FINDINGS: Redemonstrated hemorrhagic focus along the anterior falx, minimally increased in size when compared prior imaging. Surrounding edematous change. Redemonstrated territory of infarction in the right MCA distribution. Scattered areas of increased attenuation along the cortex of the affected territory, likely a combination of residual hemorrhage, petechial hemorrhages, and laminar necrosis. Overall findings grossly stable compared to prior imaging. Areas of decreased attenuation scattered throughout the deep and periventricular white matter, compatible with chronic small vessel disease. Mild central parenchymal volume loss. Ventricular size grossly stable without she evidence of hydrocephalus.    US Duplex Carotid Arteries Complete, Bilateral (11.26.23 @ 18:34) >  IMPRESSION: No significant hemodynamic stenosis of the right carotid artery. Over 70% left internal carotid artery stenosis.    MR Head No Cont (11.13.23 @ 21:36) >  IMPRESSION:   RIGHT frontoparietal craniectomy with underlying intracranial hemorrhage and edema within the RIGHT frontal lobe. Overlying small RIGHT subdural hemorrhage is also unchanged. Scant   hemorrhage in the dependent portions of the BILATERAL lateral ventricles and minimal subarachnoid hemorrhage seen in the BILATERAL frontal and parietal lobes. Moderate periventricular and deep white matter ischemia is noted. Encephalomalacia and gliosis seen in the anterior frontal lobes bilaterally.Tiny acute lacunar infarction in the LEFT cerebellum and tiny acute cortical infarction in the RIGHT parietal lobe. Restricted diffusion also noted about the surgical cavity suggesting infarcted parenchyma.  --- End of Report ---    TTE Echo Complete w/o Contrast w/ Doppler (11.11.23 @ 08:58) >  Summary:   1. Endocardial visualization was enhanced with intravenous echo contrast.   2. Normal global left ventricular systolic function.   3. There is mild concentric left ventricular hypertrophy.   4. Left ventricular ejection fraction, by visual estimation, is 60 to 65%.   5. Spectral Doppler shows impaired relaxation pattern of left ventricular myocardial filling (Grade I diastolic dysfunction).   6. Elevated mean left atrial pressure.   7. Mildly enlarged right ventricle.   8. Normal left atrial size.   9. Normal right atrialsize.  10. Moderate paradoxial low gradient aortic stenosis.  11. The Dimesionless Index value is .39.  12. Moderately calcified aortic valve with mobile echodensity poorly visualized. May represent artifact vs mobile calcification vs vegetation. Consider WHIT if clinically indicated.  13. Mild aortic regurgitation.  14. Moderate thickening and calcification of the anterior and posterior mitral valve leaflets.  15. Mild tricuspid regurgitation.  < end of copied text >      CT Angio Head w/ IV Cont / CT Perfusion (11.10.23 @ 11:45) >  CTA BRAIN:  1.  No evidence of intracranial aneurysm, critical stenosis, or abrupt cut off of intraluminal contrast.  2.  No distinct opacification of the right sigmoid sinus. Although this is not a designated CTV study, an obstructive thrombus is not entirely excluded.    CTA NECK:  1.  Atherosclerotic calcification and plaque in the left carotid bulb/proximal ICA resulting in severe luminal narrowing. Recommend Doppler imaging for further evaluation of flow.  2.  Atherosclerotic calcification in the right vertebral artery ostium resulting in moderate severe luminal narrowing.  3.  Nodularity to the bilateral lobes of the thyroid. Consider nonemergent thyroid ultrasound for further characterization if clinically indicated.    CT PERFUSION:  1.  Increased CBF and CBV values in the anterior right MCA distribution, correlating with the known hemorrhagic infarction.  2.  Slightly increased Tmax values about the periphery of the infarction which may correlate with an ischemic penumbra.  --- End of Report ---    CT Brain Stroke Protocol (11.10.23 @ 09:19) >  IMPRESSION: Acute 6 cm right lateral frontal lobe parenchymal hemorrhage. Additional multifocal subarachnoid and subdural hemorrhage as described above. No cerebral herniation. Underlying mass cannot be ruled out. Recommend MRI brain with IV contrast when clinically feasible.

## 2023-12-23 LAB
-  AMOXICILLIN/CLAVULANIC ACID: SIGNIFICANT CHANGE UP
-  AMOXICILLIN/CLAVULANIC ACID: SIGNIFICANT CHANGE UP
-  AMPICILLIN/SULBACTAM: SIGNIFICANT CHANGE UP
-  AMPICILLIN/SULBACTAM: SIGNIFICANT CHANGE UP
-  AMPICILLIN: SIGNIFICANT CHANGE UP
-  AMPICILLIN: SIGNIFICANT CHANGE UP
-  AZTREONAM: SIGNIFICANT CHANGE UP
-  AZTREONAM: SIGNIFICANT CHANGE UP
-  CEFAZOLIN: SIGNIFICANT CHANGE UP
-  CEFAZOLIN: SIGNIFICANT CHANGE UP
-  CEFEPIME: SIGNIFICANT CHANGE UP
-  CEFEPIME: SIGNIFICANT CHANGE UP
-  CEFOXITIN: SIGNIFICANT CHANGE UP
-  CEFOXITIN: SIGNIFICANT CHANGE UP
-  CEFTAZIDIME/AVIBACTAM: SIGNIFICANT CHANGE UP
-  CEFTAZIDIME/AVIBACTAM: SIGNIFICANT CHANGE UP
-  CEFTRIAXONE: SIGNIFICANT CHANGE UP
-  CEFTRIAXONE: SIGNIFICANT CHANGE UP
-  CIPROFLOXACIN: SIGNIFICANT CHANGE UP
-  CIPROFLOXACIN: SIGNIFICANT CHANGE UP
-  ERTAPENEM: SIGNIFICANT CHANGE UP
-  ERTAPENEM: SIGNIFICANT CHANGE UP
-  GENTAMICIN: SIGNIFICANT CHANGE UP
-  GENTAMICIN: SIGNIFICANT CHANGE UP
-  IMIPENEM: SIGNIFICANT CHANGE UP
-  IMIPENEM: SIGNIFICANT CHANGE UP
-  LEVOFLOXACIN: SIGNIFICANT CHANGE UP
-  LEVOFLOXACIN: SIGNIFICANT CHANGE UP
-  MEROPENEM: SIGNIFICANT CHANGE UP
-  MEROPENEM: SIGNIFICANT CHANGE UP
-  PIPERACILLIN/TAZOBACTAM: SIGNIFICANT CHANGE UP
-  PIPERACILLIN/TAZOBACTAM: SIGNIFICANT CHANGE UP
-  TOBRAMYCIN: SIGNIFICANT CHANGE UP
-  TOBRAMYCIN: SIGNIFICANT CHANGE UP
-  TRIMETHOPRIM/SULFAMETHOXAZOLE: SIGNIFICANT CHANGE UP
-  TRIMETHOPRIM/SULFAMETHOXAZOLE: SIGNIFICANT CHANGE UP
ALBUMIN SERPL ELPH-MCNC: 2.2 G/DL — LOW (ref 3.3–5.2)
ALBUMIN SERPL ELPH-MCNC: 2.2 G/DL — LOW (ref 3.3–5.2)
ALBUMIN SERPL ELPH-MCNC: 2.5 G/DL — LOW (ref 3.3–5.2)
ALBUMIN SERPL ELPH-MCNC: 2.5 G/DL — LOW (ref 3.3–5.2)
ALP SERPL-CCNC: 80 U/L — SIGNIFICANT CHANGE UP (ref 40–120)
ALP SERPL-CCNC: 80 U/L — SIGNIFICANT CHANGE UP (ref 40–120)
ALP SERPL-CCNC: 92 U/L — SIGNIFICANT CHANGE UP (ref 40–120)
ALP SERPL-CCNC: 92 U/L — SIGNIFICANT CHANGE UP (ref 40–120)
ALT FLD-CCNC: 43 U/L — HIGH
ANION GAP SERPL CALC-SCNC: 14 MMOL/L — SIGNIFICANT CHANGE UP (ref 5–17)
ANION GAP SERPL CALC-SCNC: 14 MMOL/L — SIGNIFICANT CHANGE UP (ref 5–17)
ANION GAP SERPL CALC-SCNC: 16 MMOL/L — SIGNIFICANT CHANGE UP (ref 5–17)
ANION GAP SERPL CALC-SCNC: 16 MMOL/L — SIGNIFICANT CHANGE UP (ref 5–17)
APTT BLD: 38.8 SEC — HIGH (ref 24.5–35.6)
APTT BLD: 38.8 SEC — HIGH (ref 24.5–35.6)
AST SERPL-CCNC: 57 U/L — HIGH
AST SERPL-CCNC: 57 U/L — HIGH
AST SERPL-CCNC: 65 U/L — HIGH
AST SERPL-CCNC: 65 U/L — HIGH
BILIRUB SERPL-MCNC: 0.3 MG/DL — LOW (ref 0.4–2)
BLANDM BLD POS QL PROBE: SIGNIFICANT CHANGE UP
BLANDM BLD POS QL PROBE: SIGNIFICANT CHANGE UP
BLD GP AB SCN SERPL QL: SIGNIFICANT CHANGE UP
BLD GP AB SCN SERPL QL: SIGNIFICANT CHANGE UP
BUN SERPL-MCNC: 45.7 MG/DL — HIGH (ref 8–20)
BUN SERPL-MCNC: 45.7 MG/DL — HIGH (ref 8–20)
BUN SERPL-MCNC: 45.9 MG/DL — HIGH (ref 8–20)
BUN SERPL-MCNC: 45.9 MG/DL — HIGH (ref 8–20)
CALCIUM SERPL-MCNC: 8.3 MG/DL — LOW (ref 8.4–10.5)
CHLORIDE SERPL-SCNC: 103 MMOL/L — SIGNIFICANT CHANGE UP (ref 96–108)
CHLORIDE SERPL-SCNC: 103 MMOL/L — SIGNIFICANT CHANGE UP (ref 96–108)
CHLORIDE SERPL-SCNC: 104 MMOL/L — SIGNIFICANT CHANGE UP (ref 96–108)
CHLORIDE SERPL-SCNC: 104 MMOL/L — SIGNIFICANT CHANGE UP (ref 96–108)
CO2 SERPL-SCNC: 18 MMOL/L — LOW (ref 22–29)
CO2 SERPL-SCNC: 18 MMOL/L — LOW (ref 22–29)
CO2 SERPL-SCNC: 19 MMOL/L — LOW (ref 22–29)
CO2 SERPL-SCNC: 19 MMOL/L — LOW (ref 22–29)
CREAT SERPL-MCNC: 1.48 MG/DL — HIGH (ref 0.5–1.3)
CREAT SERPL-MCNC: 1.48 MG/DL — HIGH (ref 0.5–1.3)
CREAT SERPL-MCNC: 1.69 MG/DL — HIGH (ref 0.5–1.3)
CREAT SERPL-MCNC: 1.69 MG/DL — HIGH (ref 0.5–1.3)
CULTURE RESULTS: ABNORMAL
CULTURE RESULTS: ABNORMAL
EGFR: 40 ML/MIN/1.73M2 — LOW
EGFR: 40 ML/MIN/1.73M2 — LOW
EGFR: 47 ML/MIN/1.73M2 — LOW
EGFR: 47 ML/MIN/1.73M2 — LOW
GLUCOSE SERPL-MCNC: 135 MG/DL — HIGH (ref 70–99)
GLUCOSE SERPL-MCNC: 135 MG/DL — HIGH (ref 70–99)
GLUCOSE SERPL-MCNC: 144 MG/DL — HIGH (ref 70–99)
GLUCOSE SERPL-MCNC: 144 MG/DL — HIGH (ref 70–99)
HCT VFR BLD CALC: 20 % — CRITICAL LOW (ref 39–50)
HCT VFR BLD CALC: 20 % — CRITICAL LOW (ref 39–50)
HCT VFR BLD CALC: 20.1 % — CRITICAL LOW (ref 39–50)
HCT VFR BLD CALC: 20.1 % — CRITICAL LOW (ref 39–50)
HCT VFR BLD CALC: 21.8 % — LOW (ref 39–50)
HCT VFR BLD CALC: 21.8 % — LOW (ref 39–50)
HGB BLD-MCNC: 6.7 G/DL — CRITICAL LOW (ref 13–17)
HGB BLD-MCNC: 6.7 G/DL — CRITICAL LOW (ref 13–17)
HGB BLD-MCNC: 6.8 G/DL — CRITICAL LOW (ref 13–17)
HGB BLD-MCNC: 6.8 G/DL — CRITICAL LOW (ref 13–17)
HGB BLD-MCNC: 7.7 G/DL — LOW (ref 13–17)
HGB BLD-MCNC: 7.7 G/DL — LOW (ref 13–17)
INR BLD: 1.38 RATIO — HIGH (ref 0.85–1.18)
INR BLD: 1.38 RATIO — HIGH (ref 0.85–1.18)
LACTATE SERPL-SCNC: 1.3 MMOL/L — SIGNIFICANT CHANGE UP (ref 0.5–2)
LACTATE SERPL-SCNC: 1.3 MMOL/L — SIGNIFICANT CHANGE UP (ref 0.5–2)
MAGNESIUM SERPL-MCNC: 2.4 MG/DL — SIGNIFICANT CHANGE UP (ref 1.6–2.6)
MAGNESIUM SERPL-MCNC: 2.4 MG/DL — SIGNIFICANT CHANGE UP (ref 1.6–2.6)
MAGNESIUM SERPL-MCNC: 2.4 MG/DL — SIGNIFICANT CHANGE UP (ref 1.8–2.6)
MAGNESIUM SERPL-MCNC: 2.4 MG/DL — SIGNIFICANT CHANGE UP (ref 1.8–2.6)
MCHC RBC-ENTMCNC: 31 PG — SIGNIFICANT CHANGE UP (ref 27–34)
MCHC RBC-ENTMCNC: 31 PG — SIGNIFICANT CHANGE UP (ref 27–34)
MCHC RBC-ENTMCNC: 32.1 PG — SIGNIFICANT CHANGE UP (ref 27–34)
MCHC RBC-ENTMCNC: 32.1 PG — SIGNIFICANT CHANGE UP (ref 27–34)
MCHC RBC-ENTMCNC: 32.5 PG — SIGNIFICANT CHANGE UP (ref 27–34)
MCHC RBC-ENTMCNC: 32.5 PG — SIGNIFICANT CHANGE UP (ref 27–34)
MCHC RBC-ENTMCNC: 33.5 GM/DL — SIGNIFICANT CHANGE UP (ref 32–36)
MCHC RBC-ENTMCNC: 33.5 GM/DL — SIGNIFICANT CHANGE UP (ref 32–36)
MCHC RBC-ENTMCNC: 33.8 GM/DL — SIGNIFICANT CHANGE UP (ref 32–36)
MCHC RBC-ENTMCNC: 33.8 GM/DL — SIGNIFICANT CHANGE UP (ref 32–36)
MCHC RBC-ENTMCNC: 35.3 GM/DL — SIGNIFICANT CHANGE UP (ref 32–36)
MCHC RBC-ENTMCNC: 35.3 GM/DL — SIGNIFICANT CHANGE UP (ref 32–36)
MCV RBC AUTO: 90.8 FL — SIGNIFICANT CHANGE UP (ref 80–100)
MCV RBC AUTO: 90.8 FL — SIGNIFICANT CHANGE UP (ref 80–100)
MCV RBC AUTO: 92.6 FL — SIGNIFICANT CHANGE UP (ref 80–100)
MCV RBC AUTO: 92.6 FL — SIGNIFICANT CHANGE UP (ref 80–100)
MCV RBC AUTO: 96.2 FL — SIGNIFICANT CHANGE UP (ref 80–100)
MCV RBC AUTO: 96.2 FL — SIGNIFICANT CHANGE UP (ref 80–100)
METHOD TYPE: SIGNIFICANT CHANGE UP
ORGANISM # SPEC MICROSCOPIC CNT: ABNORMAL
ORGANISM # SPEC MICROSCOPIC CNT: SIGNIFICANT CHANGE UP
ORGANISM # SPEC MICROSCOPIC CNT: SIGNIFICANT CHANGE UP
PHOSPHATE SERPL-MCNC: 4.3 MG/DL — SIGNIFICANT CHANGE UP (ref 2.4–4.7)
PHOSPHATE SERPL-MCNC: 4.3 MG/DL — SIGNIFICANT CHANGE UP (ref 2.4–4.7)
PHOSPHATE SERPL-MCNC: 5.5 MG/DL — HIGH (ref 2.4–4.7)
PHOSPHATE SERPL-MCNC: 5.5 MG/DL — HIGH (ref 2.4–4.7)
PLATELET # BLD AUTO: 130 K/UL — LOW (ref 150–400)
PLATELET # BLD AUTO: 130 K/UL — LOW (ref 150–400)
PLATELET # BLD AUTO: 147 K/UL — LOW (ref 150–400)
PLATELET # BLD AUTO: 147 K/UL — LOW (ref 150–400)
PLATELET # BLD AUTO: 197 K/UL — SIGNIFICANT CHANGE UP (ref 150–400)
PLATELET # BLD AUTO: 197 K/UL — SIGNIFICANT CHANGE UP (ref 150–400)
POTASSIUM SERPL-MCNC: 3.5 MMOL/L — SIGNIFICANT CHANGE UP (ref 3.5–5.3)
POTASSIUM SERPL-MCNC: 3.5 MMOL/L — SIGNIFICANT CHANGE UP (ref 3.5–5.3)
POTASSIUM SERPL-MCNC: 4.3 MMOL/L — SIGNIFICANT CHANGE UP (ref 3.5–5.3)
POTASSIUM SERPL-MCNC: 4.3 MMOL/L — SIGNIFICANT CHANGE UP (ref 3.5–5.3)
POTASSIUM SERPL-SCNC: 3.5 MMOL/L — SIGNIFICANT CHANGE UP (ref 3.5–5.3)
POTASSIUM SERPL-SCNC: 3.5 MMOL/L — SIGNIFICANT CHANGE UP (ref 3.5–5.3)
POTASSIUM SERPL-SCNC: 4.3 MMOL/L — SIGNIFICANT CHANGE UP (ref 3.5–5.3)
POTASSIUM SERPL-SCNC: 4.3 MMOL/L — SIGNIFICANT CHANGE UP (ref 3.5–5.3)
PROT SERPL-MCNC: 5 G/DL — LOW (ref 6.6–8.7)
PROT SERPL-MCNC: 5 G/DL — LOW (ref 6.6–8.7)
PROT SERPL-MCNC: 5.1 G/DL — LOW (ref 6.6–8.7)
PROT SERPL-MCNC: 5.1 G/DL — LOW (ref 6.6–8.7)
PROTHROM AB SERPL-ACNC: 15.2 SEC — HIGH (ref 9.5–13)
PROTHROM AB SERPL-ACNC: 15.2 SEC — HIGH (ref 9.5–13)
RBC # BLD: 2.09 M/UL — LOW (ref 4.2–5.8)
RBC # BLD: 2.09 M/UL — LOW (ref 4.2–5.8)
RBC # BLD: 2.16 M/UL — LOW (ref 4.2–5.8)
RBC # BLD: 2.16 M/UL — LOW (ref 4.2–5.8)
RBC # BLD: 2.4 M/UL — LOW (ref 4.2–5.8)
RBC # BLD: 2.4 M/UL — LOW (ref 4.2–5.8)
RBC # FLD: 14.7 % — HIGH (ref 10.3–14.5)
RBC # FLD: 14.7 % — HIGH (ref 10.3–14.5)
RBC # FLD: 15.8 % — HIGH (ref 10.3–14.5)
RBC # FLD: 15.8 % — HIGH (ref 10.3–14.5)
RBC # FLD: 15.9 % — HIGH (ref 10.3–14.5)
RBC # FLD: 15.9 % — HIGH (ref 10.3–14.5)
SODIUM SERPL-SCNC: 137 MMOL/L — SIGNIFICANT CHANGE UP (ref 135–145)
SPECIMEN SOURCE: SIGNIFICANT CHANGE UP
SPECIMEN SOURCE: SIGNIFICANT CHANGE UP
VANCOMYCIN TROUGH SERPL-MCNC: 8.8 UG/ML — LOW (ref 10–20)
VANCOMYCIN TROUGH SERPL-MCNC: 8.8 UG/ML — LOW (ref 10–20)
WBC # BLD: 5.64 K/UL — SIGNIFICANT CHANGE UP (ref 3.8–10.5)
WBC # BLD: 5.64 K/UL — SIGNIFICANT CHANGE UP (ref 3.8–10.5)
WBC # BLD: 5.98 K/UL — SIGNIFICANT CHANGE UP (ref 3.8–10.5)
WBC # BLD: 5.98 K/UL — SIGNIFICANT CHANGE UP (ref 3.8–10.5)
WBC # BLD: 7.57 K/UL — SIGNIFICANT CHANGE UP (ref 3.8–10.5)
WBC # BLD: 7.57 K/UL — SIGNIFICANT CHANGE UP (ref 3.8–10.5)
WBC # FLD AUTO: 5.64 K/UL — SIGNIFICANT CHANGE UP (ref 3.8–10.5)
WBC # FLD AUTO: 5.64 K/UL — SIGNIFICANT CHANGE UP (ref 3.8–10.5)
WBC # FLD AUTO: 5.98 K/UL — SIGNIFICANT CHANGE UP (ref 3.8–10.5)
WBC # FLD AUTO: 5.98 K/UL — SIGNIFICANT CHANGE UP (ref 3.8–10.5)
WBC # FLD AUTO: 7.57 K/UL — SIGNIFICANT CHANGE UP (ref 3.8–10.5)
WBC # FLD AUTO: 7.57 K/UL — SIGNIFICANT CHANGE UP (ref 3.8–10.5)

## 2023-12-23 PROCEDURE — 99232 SBSQ HOSP IP/OBS MODERATE 35: CPT

## 2023-12-23 PROCEDURE — 93010 ELECTROCARDIOGRAM REPORT: CPT

## 2023-12-23 PROCEDURE — 95720 EEG PHY/QHP EA INCR W/VEEG: CPT

## 2023-12-23 RX ORDER — DIGOXIN 250 MCG
250 TABLET ORAL ONCE
Refills: 0 | Status: COMPLETED | OUTPATIENT
Start: 2023-12-23 | End: 2023-12-23

## 2023-12-23 RX ORDER — POTASSIUM CHLORIDE 20 MEQ
20 PACKET (EA) ORAL ONCE
Refills: 0 | Status: COMPLETED | OUTPATIENT
Start: 2023-12-23 | End: 2023-12-23

## 2023-12-23 RX ORDER — ALBUMIN HUMAN 25 %
50 VIAL (ML) INTRAVENOUS EVERY 4 HOURS
Refills: 0 | Status: COMPLETED | OUTPATIENT
Start: 2023-12-23 | End: 2023-12-23

## 2023-12-23 RX ORDER — PHENYLEPHRINE HYDROCHLORIDE 10 MG/ML
3 INJECTION INTRAVENOUS
Qty: 160 | Refills: 0 | Status: DISCONTINUED | OUTPATIENT
Start: 2023-12-23 | End: 2023-12-25

## 2023-12-23 RX ORDER — SODIUM CHLORIDE 9 MG/ML
1000 INJECTION, SOLUTION INTRAVENOUS
Refills: 0 | Status: DISCONTINUED | OUTPATIENT
Start: 2023-12-23 | End: 2023-12-24

## 2023-12-23 RX ORDER — DIGOXIN 250 MCG
250 TABLET ORAL ONCE
Refills: 0 | Status: DISCONTINUED | OUTPATIENT
Start: 2023-12-23 | End: 2023-12-23

## 2023-12-23 RX ORDER — METOPROLOL TARTRATE 50 MG
5 TABLET ORAL EVERY 6 HOURS
Refills: 0 | Status: DISCONTINUED | OUTPATIENT
Start: 2023-12-23 | End: 2023-12-25

## 2023-12-23 RX ORDER — VANCOMYCIN HCL 1 G
1250 VIAL (EA) INTRAVENOUS EVERY 24 HOURS
Refills: 0 | Status: DISCONTINUED | OUTPATIENT
Start: 2023-12-24 | End: 2023-12-24

## 2023-12-23 RX ORDER — VANCOMYCIN HCL 1 G
500 VIAL (EA) INTRAVENOUS ONCE
Refills: 0 | Status: COMPLETED | OUTPATIENT
Start: 2023-12-23 | End: 2023-12-23

## 2023-12-23 RX ORDER — PANTOPRAZOLE SODIUM 20 MG/1
8 TABLET, DELAYED RELEASE ORAL
Qty: 80 | Refills: 0 | Status: DISCONTINUED | OUTPATIENT
Start: 2023-12-23 | End: 2023-12-24

## 2023-12-23 RX ORDER — PHENYLEPHRINE HYDROCHLORIDE 10 MG/ML
1 INJECTION INTRAVENOUS
Qty: 40 | Refills: 0 | Status: DISCONTINUED | OUTPATIENT
Start: 2023-12-23 | End: 2023-12-23

## 2023-12-23 RX ORDER — DILTIAZEM HCL 120 MG
10 CAPSULE, EXT RELEASE 24 HR ORAL ONCE
Refills: 0 | Status: COMPLETED | OUTPATIENT
Start: 2023-12-23 | End: 2023-12-23

## 2023-12-23 RX ORDER — FENTANYL CITRATE 50 UG/ML
50 INJECTION INTRAVENOUS ONCE
Refills: 0 | Status: DISCONTINUED | OUTPATIENT
Start: 2023-12-23 | End: 2023-12-23

## 2023-12-23 RX ORDER — HYDROCORTISONE 20 MG
50 TABLET ORAL EVERY 8 HOURS
Refills: 0 | Status: DISCONTINUED | OUTPATIENT
Start: 2023-12-23 | End: 2023-12-25

## 2023-12-23 RX ORDER — METOPROLOL TARTRATE 50 MG
5 TABLET ORAL ONCE
Refills: 0 | Status: COMPLETED | OUTPATIENT
Start: 2023-12-23 | End: 2023-12-23

## 2023-12-23 RX ORDER — ACETAMINOPHEN 500 MG
1000 TABLET ORAL ONCE
Refills: 0 | Status: COMPLETED | OUTPATIENT
Start: 2023-12-23 | End: 2023-12-23

## 2023-12-23 RX ORDER — SODIUM CHLORIDE 9 MG/ML
500 INJECTION, SOLUTION INTRAVENOUS ONCE
Refills: 0 | Status: COMPLETED | OUTPATIENT
Start: 2023-12-23 | End: 2023-12-23

## 2023-12-23 RX ADMIN — Medication 10 MILLIGRAM(S): at 17:01

## 2023-12-23 RX ADMIN — FENTANYL CITRATE 50 MICROGRAM(S): 50 INJECTION INTRAVENOUS at 01:57

## 2023-12-23 RX ADMIN — ATORVASTATIN CALCIUM 80 MILLIGRAM(S): 80 TABLET, FILM COATED ORAL at 22:20

## 2023-12-23 RX ADMIN — SODIUM CHLORIDE 75 MILLILITER(S): 9 INJECTION, SOLUTION INTRAVENOUS at 09:21

## 2023-12-23 RX ADMIN — CHLORHEXIDINE GLUCONATE 1 APPLICATION(S): 213 SOLUTION TOPICAL at 06:19

## 2023-12-23 RX ADMIN — Medication 400 MILLIGRAM(S): at 16:08

## 2023-12-23 RX ADMIN — SODIUM CHLORIDE 500 MILLILITER(S): 9 INJECTION, SOLUTION INTRAVENOUS at 00:50

## 2023-12-23 RX ADMIN — SENNA PLUS 2 TABLET(S): 8.6 TABLET ORAL at 22:21

## 2023-12-23 RX ADMIN — MEMANTINE HYDROCHLORIDE 5 MILLIGRAM(S): 10 TABLET ORAL at 18:10

## 2023-12-23 RX ADMIN — Medication 250 MICROGRAM(S): at 06:18

## 2023-12-23 RX ADMIN — Medication 50 MILLILITER(S): at 14:03

## 2023-12-23 RX ADMIN — MEMANTINE HYDROCHLORIDE 5 MILLIGRAM(S): 10 TABLET ORAL at 06:13

## 2023-12-23 RX ADMIN — Medication 50 MILLIGRAM(S): at 14:02

## 2023-12-23 RX ADMIN — LEVETIRACETAM 1000 MILLIGRAM(S): 250 TABLET, FILM COATED ORAL at 06:36

## 2023-12-23 RX ADMIN — POLYETHYLENE GLYCOL 3350 17 GRAM(S): 17 POWDER, FOR SOLUTION ORAL at 12:13

## 2023-12-23 RX ADMIN — Medication 250 MILLIGRAM(S): at 09:30

## 2023-12-23 RX ADMIN — MEROPENEM 1000 MILLIGRAM(S): 1 INJECTION INTRAVENOUS at 01:07

## 2023-12-23 RX ADMIN — PHENYLEPHRINE HYDROCHLORIDE 27.3 MICROGRAM(S)/KG/MIN: 10 INJECTION INTRAVENOUS at 00:50

## 2023-12-23 RX ADMIN — Medication 1 TABLET(S): at 12:13

## 2023-12-23 RX ADMIN — Medication 1 MILLIGRAM(S): at 12:13

## 2023-12-23 RX ADMIN — Medication 50 MILLIGRAM(S): at 22:21

## 2023-12-23 RX ADMIN — Medication 2 MILLIGRAM(S): at 22:21

## 2023-12-23 RX ADMIN — MEROPENEM 1000 MILLIGRAM(S): 1 INJECTION INTRAVENOUS at 14:02

## 2023-12-23 RX ADMIN — Medication 50 MILLILITER(S): at 09:29

## 2023-12-23 RX ADMIN — Medication 50 MILLILITER(S): at 01:07

## 2023-12-23 RX ADMIN — LEVETIRACETAM 1000 MILLIGRAM(S): 250 TABLET, FILM COATED ORAL at 18:10

## 2023-12-23 RX ADMIN — Medication 50 MILLILITER(S): at 06:13

## 2023-12-23 RX ADMIN — PANTOPRAZOLE SODIUM 40 MILLIGRAM(S): 20 TABLET, DELAYED RELEASE ORAL at 06:15

## 2023-12-23 RX ADMIN — PANTOPRAZOLE SODIUM 10 MG/HR: 20 TABLET, DELAYED RELEASE ORAL at 22:21

## 2023-12-23 RX ADMIN — FENTANYL CITRATE 50 MICROGRAM(S): 50 INJECTION INTRAVENOUS at 02:12

## 2023-12-23 RX ADMIN — Medication 100 MILLIEQUIVALENT(S): at 16:07

## 2023-12-23 RX ADMIN — Medication 100 MILLIGRAM(S): at 18:10

## 2023-12-23 RX ADMIN — PHENYLEPHRINE HYDROCHLORIDE 40.9 MICROGRAM(S)/KG/MIN: 10 INJECTION INTRAVENOUS at 03:08

## 2023-12-23 RX ADMIN — SODIUM CHLORIDE 500 MILLILITER(S): 9 INJECTION, SOLUTION INTRAVENOUS at 09:20

## 2023-12-23 RX ADMIN — Medication 1000 MILLIGRAM(S): at 16:45

## 2023-12-23 RX ADMIN — Medication 5 MILLIGRAM(S): at 17:55

## 2023-12-23 RX ADMIN — Medication 100 MILLIGRAM(S): at 06:15

## 2023-12-23 NOTE — PROGRESS NOTE ADULT - NS PANP COMMENT GEN_ALL_CORE FT
#shock, possible septic shock, recurrent pneumonia with Klebsiella on sputum culture  #coffee ground, possible upper GI bleed, no endoscopy at this time   #Right front ICH s/p Right craini 11/10 , left hemiparesis and left neglect  #LUE dvt - trial of eliquis with rebleed - new anterior falx SDH  11/26 - eliquis on hold  #mobile echodensity on aortic valve, unclear vegetation, family declined WHIT  #afib rvr ,     s/p 2 unit PRBc 12/23  per neurosurgery, agree to hold aspirin and dvt ppx  started PPI drip  if continue to require transfusion, may need repeat ct abd  (last cta abd was 12/21 and show no active GI bleed, however tipton shave endoleak from previous AAA repair, no RP bleed )   dilt and metoprolol PO dose on hold due to shock  s/p dig 0.25 mg IV 12/23 AM, s/p dilt IVp 10 1x and metopolol 5 mg IVP 1x in PM   if unable to control, may try standing digoxin   s/p 500 iv bolus today, and with 75 cc/hr fluid going for total of 1 L   no GI intervention at this time  pass SBT, however not optimize for extubation due to persistent shock state and hemoglobin drop  prognosis very guarded  discussed with daughters (HCP), will try to extubate, however given patient has underlying stroke, if patient fails extubation, may end up needing trach and peg.  So far still full code       pr

## 2023-12-23 NOTE — PROGRESS NOTE ADULT - SUBJECTIVE AND OBJECTIVE BOX
Chief Complaint:  Patient is a 83y old  Male who presents with a chief complaint of s/p unwitnessed fall with head strike (23 Dec 2023 05:31)      HPI/ 24 hr events: Patient seen and examined at bedside. He remains intubated on pressors. HGB 6.8    MEDICATIONS:   MEDICATIONS  (STANDING):  albumin human 25% IVPB 50 milliLiter(s) IV Intermittent every 4 hours  atorvastatin 80 milliGRAM(s) Oral at bedtime  chlorhexidine 2% Cloths 1 Application(s) Topical <User Schedule>  doxazosin 2 milliGRAM(s) Oral at bedtime  folic acid 1 milliGRAM(s) Oral daily  hydrocortisone sodium succinate Injectable 100 milliGRAM(s) IV Push every 8 hours  levETIRAcetam   Injectable 1000 milliGRAM(s) IV Push every 12 hours  memantine 5 milliGRAM(s) Oral two times a day  meropenem Injectable 1000 milliGRAM(s) IV Push every 12 hours  Nephro-roma 1 Tablet(s) Oral daily  pantoprazole  Injectable 40 milliGRAM(s) IV Push every 12 hours  phenylephrine    Infusion 3 MICROgram(s)/kG/Min (40.9 mL/Hr) IV Continuous <Continuous>  polyethylene glycol 3350 17 Gram(s) Oral daily  propofol Infusion 10 MICROgram(s)/kG/Min (4.37 mL/Hr) IV Continuous <Continuous>  senna 2 Tablet(s) Oral at bedtime  vancomycin  IVPB 1000 milliGRAM(s) IV Intermittent <User Schedule>  vasopressin Infusion 0.04 Unit(s)/Min (6 mL/Hr) IV Continuous <Continuous>    MEDICATIONS  (PRN):  acetaminophen     Tablet .. 650 milliGRAM(s) Oral every 6 hours PRN Temp greater or equal to 38C (100.4F), Mild Pain (1 - 3)  artificial tears (preservative free) Ophthalmic Solution 1 Drop(s) Both EYES every 6 hours PRN Dry Eyes      ALLERGIES:   Allergies    penicillin (Rash)  heparin (Other (Severe))  penicillin (Hives)  Cipro (Other)  Levaquin (Other)  heparin (Other)    Intolerances        VITAL SIGNS:   Vital Signs Last 24 Hrs  T(C): 37.9 (23 Dec 2023 08:30), Max: 38.6 (22 Dec 2023 10:00)  T(F): 100.2 (23 Dec 2023 08:30), Max: 101.5 (22 Dec 2023 10:00)  HR: 75 (23 Dec 2023 08:30) (73 - 154)  BP: 153/59 (23 Dec 2023 08:00) (100/54 - 153/59)  BP(mean): 85 (23 Dec 2023 08:00) (69 - 85)  RR: 20 (23 Dec 2023 08:30) (18 - 33)  SpO2: 100% (23 Dec 2023 08:30) (100% - 100%)    Parameters below as of 23 Dec 2023 08:00  Patient On (Oxygen Delivery Method): ventilator    O2 Concentration (%): 40  I&O's Summary    22 Dec 2023 07:01  -  23 Dec 2023 07:00  --------------------------------------------------------  IN: 2248 mL / OUT: 990 mL / NET: 1258 mL        PHYSICAL EXAM:   GENERAL:  intubated   HEENT:  sclera anicteric  CHEST:  No increased effort, breath sounds clear  HEART:  Regular rate  ABDOMEN:  Soft, non-tender, non-distended, normoactive bowel sounds, no rebound or guarding  EXTREMITIES: No edema  SKIN:  Warm, dry  NEURO:  sedated    LABS:                        6.8    7.57  )-----------( 197      ( 23 Dec 2023 02:50 )             20.1     12-23    137  |  104  |  45.9<H>  ----------------------------<  144<H>  4.3   |  19.0<L>  |  1.69<H>    Ca    8.3<L>      23 Dec 2023 02:50  Phos  5.5     12-23  Mg     2.4     12-23    TPro  5.0<L>  /  Alb  2.2<L>  /  TBili  0.3<L>  /  DBili  x   /  AST  65<H>  /  ALT  43<H>  /  AlkPhos  92  12-23    LIVER FUNCTIONS - ( 23 Dec 2023 02:50 )  Alb: 2.2 g/dL / Pro: 5.0 g/dL / ALK PHOS: 92 U/L / ALT: 43 U/L / AST: 65 U/L / GGT: x           PT/INR - ( 23 Dec 2023 03:45 )   PT: 15.2 sec;   INR: 1.38 ratio         PTT - ( 23 Dec 2023 03:45 )  PTT:38.8 sec  Urinalysis Basic - ( 23 Dec 2023 02:50 )    Color: x / Appearance: x / SG: x / pH: x  Gluc: 144 mg/dL / Ketone: x  / Bili: x / Urobili: x   Blood: x / Protein: x / Nitrite: x   Leuk Esterase: x / RBC: x / WBC x   Sq Epi: x / Non Sq Epi: x / Bacteria: x        Culture - Acid Fast - CSF (collected 21 Dec 2023 19:44)  Source: .CSF CSF    Culture - CSF with Gram Stain (collected 21 Dec 2023 19:44)  Source: .CSF CSF  Gram Stain (22 Dec 2023 02:07):    polymorphonuclear leukocytes seen    No organisms seen    by cytocentrifuge  Preliminary Report (22 Dec 2023 16:44):    No growth    Culture - Fungal, CSF (collected 21 Dec 2023 19:44)  Source: .CSF CSF  Preliminary Report (23 Dec 2023 07:54):    Testing in progress    Culture - Sputum (collected 21 Dec 2023 10:10)  Source: ET Tube ET Tube  Gram Stain (21 Dec 2023 23:16):    Few polymorphonuclear leukocytes per low power field    Rare Squamous epithelial cells per low power field    Moderate Yeast like cells per oil power field    Moderate Gram Positive Rods per oil power field    Few Gram positive cocci in pairs per oil powerfield    Rare Gram Negative Rods per oil power field  Preliminary Report (22 Dec 2023 12:01):    Moderate Klebsiella aerogenes (Previously Enterobacter)    Normal Respiratory Isabell present                                  RADIOLOGY & ADDITIONAL STUDIES:      ACC: [id number] EXAM:  CT ANGIO ABD PELV (W)AW IC   ORDERED BY: SUDHIR SORIANO     ACC: [id number] EXAM:  CT CHEST IC   ORDERED BY: SUDHIR SORIANO     PROCEDURE DATE:  12/21/2023          INTERPRETATION:  CLINICAL INFORMATION: Abdominal distention. Acidosis.   Evaluate for bowel ischemia.    COMPARISON: CT scan abdomen pelvis 11/22/2023.    CONTRAST/COMPLICATIONS:  IV Contrast: Omnipaque 350 (accession [id number]), IV contrast documented   in unlinked concurrent exam (accession [id number])  90 cc administered   10   cc discarded  Oral Contrast: NONE  Complications: None reported at time of study completion    PROCEDURE:  CT Angiography of the Chest Abdomen and Pelvis.  Arterial abdomen, followed by  Delayed phases chest and abdomen were   acquired.  Sagittal and coronal reformats were performed as well as 3D (MIP)   reconstructions.    FINDINGS:    CHEST:    LUNGS AND LARGE AIRWAYS:  PLEURA:    Endotracheal tube, tip above the level michael.  The central airways are patent.    There are diffuse bilateral upper and lower lobe groundglass   centrilobular and tree-in-bud nodular opacities  There are bibasilar lower lobe airspace consolidations and left lingula   airspace consolidation.    There is a trace left pleural effusion.    VESSELS:  Atherosclerotic changes thoracic aorta with coronary artery calcification   and/or stent.  No central pulmonary embolism is noted.    HEART:   Heart size is normal.  Aortic and mitral valvular calcification. No pericardial effusion.    MEDIASTINUM AND CHERY:  No enlarged lymphadenopathy.    CHEST WALL AND LOWER NECK: Within normal limits.    ABDOMEN AND PELVIS:    LIVER: Within normal limits.  BILE DUCTS: Normal caliber.  GALLBLADDER: Dependent sludge or gallstones.  SPLEEN: Small, peripheral wedge-shaped defect posterior spleen, could   reflect infarct.  PANCREAS: Within normal limits.  ADRENALS: Within normal limits.  KIDNEYS/URETERS:  Left nephrectomy.  Multiple indeterminate right renal hypodense lesions.  No hydronephrosis.    BLADDER: Collapsed around Vallecillo catheter.  REPRODUCTIVE ORGANS: Mildly prominent prostate gland.    BOWEL:  Nasogastric tube within stomach which is nondistended.  There is a distended stomach to  Air filled colon extending to the rectum without bowel obstruction.  Rectal probe is present.  No extraluminal gas or congenital fluid collection.   PERITONEUM: No ascites.    VESSELS:  Aortobiiliac stent grafting right renal artery graft.    The native aneurysm sac measures 7.1 cm, stable from prior exam.  There is suggestion of contrast within the aneurysm sac near the proximal   stent graft attachment on arterial phase imaging, which becomes more   pronounced on delayed phase imaging (5:62, 63 and  10:114,115).    There is a dilated left common iliac artery measuring, 2 cm.  The iliac arteries are patent.    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  The superior mesenteric artery is patent.  The superior mesenteric vein and portal venous confluence is patent.    RETROPERITONEUM/LYMPH NODES:  No enlarged lymphadenopathy.  No retroperitoneal hematoma.    ABDOMINAL WALL:  Small bilateral fat-containing inguinal hernias.  Right femoral venous catheter.    BONES: Degenerative changes.    IMPRESSION:    Diffuse groundglass centrilobular and tree-in-bud nodular opacities which   is suggestive of infectious or aspiration bronchiolitis.    Bilateral lower lobe airspace consolidations may represent pneumonia   and/or atelectasis.    Findings suggestive of endoleak near the proximal stent graft attachment;   evaluation is somewhat limited without noncontrast exam.  Stable size of the aneurysm sac.    Diffuse colonic fecal retention with colonic distention.  No CT evidence for acute thromboembolic mesenteric ischemia.    Findings were discussed by telephone with Dr. Soriano at the time of   interpretation on 12/21/2023.    Other findings as discussed above.    --- End of Report ---            ARACELIS ENRIQUEZ MD; Attending Radiologist  This document has been electronically signed. Dec 21 2023  6:44PM  12-21-23 @ 17:13           Chief Complaint:  Patient is a 83y old  Male who presents with a chief complaint of s/p unwitnessed fall with head strike (23 Dec 2023 05:31)      HPI/ 24 hr events: Patient seen and examined at bedside. He remains intubated on pressors. HGB 6.8    MEDICATIONS:   MEDICATIONS  (STANDING):  albumin human 25% IVPB 50 milliLiter(s) IV Intermittent every 4 hours  atorvastatin 80 milliGRAM(s) Oral at bedtime  chlorhexidine 2% Cloths 1 Application(s) Topical <User Schedule>  doxazosin 2 milliGRAM(s) Oral at bedtime  folic acid 1 milliGRAM(s) Oral daily  hydrocortisone sodium succinate Injectable 100 milliGRAM(s) IV Push every 8 hours  levETIRAcetam   Injectable 1000 milliGRAM(s) IV Push every 12 hours  memantine 5 milliGRAM(s) Oral two times a day  meropenem Injectable 1000 milliGRAM(s) IV Push every 12 hours  Nephro-roma 1 Tablet(s) Oral daily  pantoprazole  Injectable 40 milliGRAM(s) IV Push every 12 hours  phenylephrine    Infusion 3 MICROgram(s)/kG/Min (40.9 mL/Hr) IV Continuous <Continuous>  polyethylene glycol 3350 17 Gram(s) Oral daily  propofol Infusion 10 MICROgram(s)/kG/Min (4.37 mL/Hr) IV Continuous <Continuous>  senna 2 Tablet(s) Oral at bedtime  vancomycin  IVPB 1000 milliGRAM(s) IV Intermittent <User Schedule>  vasopressin Infusion 0.04 Unit(s)/Min (6 mL/Hr) IV Continuous <Continuous>    MEDICATIONS  (PRN):  acetaminophen     Tablet .. 650 milliGRAM(s) Oral every 6 hours PRN Temp greater or equal to 38C (100.4F), Mild Pain (1 - 3)  artificial tears (preservative free) Ophthalmic Solution 1 Drop(s) Both EYES every 6 hours PRN Dry Eyes      ALLERGIES:   Allergies    penicillin (Rash)  heparin (Other (Severe))  penicillin (Hives)  Cipro (Other)  Levaquin (Other)  heparin (Other)    Intolerances        VITAL SIGNS:   Vital Signs Last 24 Hrs  T(C): 37.9 (23 Dec 2023 08:30), Max: 38.6 (22 Dec 2023 10:00)  T(F): 100.2 (23 Dec 2023 08:30), Max: 101.5 (22 Dec 2023 10:00)  HR: 75 (23 Dec 2023 08:30) (73 - 154)  BP: 153/59 (23 Dec 2023 08:00) (100/54 - 153/59)  BP(mean): 85 (23 Dec 2023 08:00) (69 - 85)  RR: 20 (23 Dec 2023 08:30) (18 - 33)  SpO2: 100% (23 Dec 2023 08:30) (100% - 100%)    Parameters below as of 23 Dec 2023 08:00  Patient On (Oxygen Delivery Method): ventilator    O2 Concentration (%): 40  I&O's Summary    22 Dec 2023 07:01  -  23 Dec 2023 07:00  --------------------------------------------------------  IN: 2248 mL / OUT: 990 mL / NET: 1258 mL        PHYSICAL EXAM:   GENERAL:  intubated   HEENT:  sclera anicteric  CHEST:  No increased effort, breath sounds clear  HEART:  Regular rate  ABDOMEN:  Soft, non-tender, non-distended, normoactive bowel sounds, no rebound or guarding  EXTREMITIES: No edema  SKIN:  Warm, dry  NEURO:  sedated    LABS:                        6.8    7.57  )-----------( 197      ( 23 Dec 2023 02:50 )             20.1     12-23    137  |  104  |  45.9<H>  ----------------------------<  144<H>  4.3   |  19.0<L>  |  1.69<H>    Ca    8.3<L>      23 Dec 2023 02:50  Phos  5.5     12-23  Mg     2.4     12-23    TPro  5.0<L>  /  Alb  2.2<L>  /  TBili  0.3<L>  /  DBili  x   /  AST  65<H>  /  ALT  43<H>  /  AlkPhos  92  12-23    LIVER FUNCTIONS - ( 23 Dec 2023 02:50 )  Alb: 2.2 g/dL / Pro: 5.0 g/dL / ALK PHOS: 92 U/L / ALT: 43 U/L / AST: 65 U/L / GGT: x           PT/INR - ( 23 Dec 2023 03:45 )   PT: 15.2 sec;   INR: 1.38 ratio         PTT - ( 23 Dec 2023 03:45 )  PTT:38.8 sec  Urinalysis Basic - ( 23 Dec 2023 02:50 )    Color: x / Appearance: x / SG: x / pH: x  Gluc: 144 mg/dL / Ketone: x  / Bili: x / Urobili: x   Blood: x / Protein: x / Nitrite: x   Leuk Esterase: x / RBC: x / WBC x   Sq Epi: x / Non Sq Epi: x / Bacteria: x        Culture - Acid Fast - CSF (collected 21 Dec 2023 19:44)  Source: .CSF CSF    Culture - CSF with Gram Stain (collected 21 Dec 2023 19:44)  Source: .CSF CSF  Gram Stain (22 Dec 2023 02:07):    polymorphonuclear leukocytes seen    No organisms seen    by cytocentrifuge  Preliminary Report (22 Dec 2023 16:44):    No growth    Culture - Fungal, CSF (collected 21 Dec 2023 19:44)  Source: .CSF CSF  Preliminary Report (23 Dec 2023 07:54):    Testing in progress    Culture - Sputum (collected 21 Dec 2023 10:10)  Source: ET Tube ET Tube  Gram Stain (21 Dec 2023 23:16):    Few polymorphonuclear leukocytes per low power field    Rare Squamous epithelial cells per low power field    Moderate Yeast like cells per oil power field    Moderate Gram Positive Rods per oil power field    Few Gram positive cocci in pairs per oil powerfield    Rare Gram Negative Rods per oil power field  Preliminary Report (22 Dec 2023 12:01):    Moderate Klebsiella aerogenes (Previously Enterobacter)    Normal Respiratory Isabell present                                  RADIOLOGY & ADDITIONAL STUDIES:      ACC: [id number] EXAM:  CT ANGIO ABD PELV (W)AW IC   ORDERED BY: SUDHIR SORIANO     ACC: [id number] EXAM:  CT CHEST IC   ORDERED BY: SUDHIR SORIANO     PROCEDURE DATE:  12/21/2023          INTERPRETATION:  CLINICAL INFORMATION: Abdominal distention. Acidosis.   Evaluate for bowel ischemia.    COMPARISON: CT scan abdomen pelvis 11/22/2023.    CONTRAST/COMPLICATIONS:  IV Contrast: Omnipaque 350 (accession [id number]), IV contrast documented   in unlinked concurrent exam (accession [id number])  90 cc administered   10   cc discarded  Oral Contrast: NONE  Complications: None reported at time of study completion    PROCEDURE:  CT Angiography of the Chest Abdomen and Pelvis.  Arterial abdomen, followed by  Delayed phases chest and abdomen were   acquired.  Sagittal and coronal reformats were performed as well as 3D (MIP)   reconstructions.    FINDINGS:    CHEST:    LUNGS AND LARGE AIRWAYS:  PLEURA:    Endotracheal tube, tip above the level michael.  The central airways are patent.    There are diffuse bilateral upper and lower lobe groundglass   centrilobular and tree-in-bud nodular opacities  There are bibasilar lower lobe airspace consolidations and left lingula   airspace consolidation.    There is a trace left pleural effusion.    VESSELS:  Atherosclerotic changes thoracic aorta with coronary artery calcification   and/or stent.  No central pulmonary embolism is noted.    HEART:   Heart size is normal.  Aortic and mitral valvular calcification. No pericardial effusion.    MEDIASTINUM AND CHERY:  No enlarged lymphadenopathy.    CHEST WALL AND LOWER NECK: Within normal limits.    ABDOMEN AND PELVIS:    LIVER: Within normal limits.  BILE DUCTS: Normal caliber.  GALLBLADDER: Dependent sludge or gallstones.  SPLEEN: Small, peripheral wedge-shaped defect posterior spleen, could   reflect infarct.  PANCREAS: Within normal limits.  ADRENALS: Within normal limits.  KIDNEYS/URETERS:  Left nephrectomy.  Multiple indeterminate right renal hypodense lesions.  No hydronephrosis.    BLADDER: Collapsed around Vallecillo catheter.  REPRODUCTIVE ORGANS: Mildly prominent prostate gland.    BOWEL:  Nasogastric tube within stomach which is nondistended.  There is a distended stomach to  Air filled colon extending to the rectum without bowel obstruction.  Rectal probe is present.  No extraluminal gas or congenital fluid collection.   PERITONEUM: No ascites.    VESSELS:  Aortobiiliac stent grafting right renal artery graft.    The native aneurysm sac measures 7.1 cm, stable from prior exam.  There is suggestion of contrast within the aneurysm sac near the proximal   stent graft attachment on arterial phase imaging, which becomes more   pronounced on delayed phase imaging (5:62, 63 and  10:114,115).    There is a dilated left common iliac artery measuring, 2 cm.  The iliac arteries are patent.    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  The superior mesenteric artery is patent.  The superior mesenteric vein and portal venous confluence is patent.    RETROPERITONEUM/LYMPH NODES:  No enlarged lymphadenopathy.  No retroperitoneal hematoma.    ABDOMINAL WALL:  Small bilateral fat-containing inguinal hernias.  Right femoral venous catheter.    BONES: Degenerative changes.    IMPRESSION:    Diffuse groundglass centrilobular and tree-in-bud nodular opacities which   is suggestive of infectious or aspiration bronchiolitis.    Bilateral lower lobe airspace consolidations may represent pneumonia   and/or atelectasis.    Findings suggestive of endoleak near the proximal stent graft attachment;   evaluation is somewhat limited without noncontrast exam.  Stable size of the aneurysm sac.    Diffuse colonic fecal retention with colonic distention.  No CT evidence for acute thromboembolic mesenteric ischemia.    Findings were discussed by telephone with Dr. Soriano at the time of   interpretation on 12/21/2023.    Other findings as discussed above.    --- End of Report ---            ARACELIS ENRIQUEZ MD; Attending Radiologist  This document has been electronically signed. Dec 21 2023  6:44PM  12-21-23 @ 17:13

## 2023-12-23 NOTE — PROGRESS NOTE ADULT - ASSESSMENT
83M w/ PMH HTN, CAD s/p stents on ASA/Plavix, RCC s/p L nephrectomy, bladder CA, BPH, CHF, LBBB, vertigo, HLD, 5.5 cm AAA without rupture post EVAR, paroxysmal afib on Eliquis, early stage Alzheimer's dementia, presented to [facility] [place] 11/10/23 s/p unwitnessed fall with head strike at home found by wife, found with multicompartmental ICH.    Interval HPI:  11/10: S/p R craniectomy hospital course significant for Klebsiella pneumonia, hypernatremia, lethargy, LUE DVT, new anterior falcine SDH.  12/7: S/p R cranioplasty, CTH s/p starting eliquis showed new R frontal ICH. 4hr repeat CTH showed worsening R frontal ICH.   12/8: Repeat CTH stable and patient was downgraded from NSICU.  12/21: Patient became unresponsive and the patient was subsequently intubated and upgraded to MICU for septic shock and respiratory failure secondary to aspiration event.    Plan:  - Q2h neuro checks  - Will continue to monitor incision  - Keppra 1000 BID, EEG results shows no seizure activity   - Minimize sedation for accurate and consistent exams  - Hold ASA and chemoprophylaxis for DVT in the setting of acute anemia with multiple bleed events  - Further supportive/medical management per primary team  - Will continue to follow  - Discussed with attending   83M w/ PMH HTN, CAD s/p stents on ASA/Plavix, RCC s/p L nephrectomy, bladder CA, BPH, CHF, LBBB, vertigo, HLD, 5.5 cm AAA without rupture post EVAR, paroxysmal afib on Eliquis, early stage Alzheimer's dementia, presented to [facility] 11/10/23 s/p unwitnessed fall with head strike at home found by wife, found with multicompartmental ICH.    Interval HPI:  11/10: S/p R craniectomy hospital course significant for Klebsiella pneumonia, hypernatremia, lethargy, LUE DVT, new anterior falcine SDH.  12/7: S/p R cranioplasty, CTH s/p starting eliquis showed new R frontal ICH. 4hr repeat CTH showed worsening R frontal ICH.   12/8: Repeat CTH stable and patient was downgraded from NSICU.  12/21: Patient became unresponsive and the patient was subsequently intubated and upgraded to MICU for septic shock and respiratory failure secondary to aspiration event.    Plan:  - Q2h neuro checks  - Will continue to monitor incision  - Keppra 1000 BID, EEG results shows no seizure activity   - Minimize sedation for accurate and consistent exams  - Hold ASA and chemoprophylaxis for DVT in the setting of acute anemia with multiple bleed events  - Further supportive/medical management per primary team  - Will continue to follow  - Discussed with attending   83M w/ PMH HTN, CAD s/p stents on ASA/Plavix, RCC s/p L nephrectomy, bladder CA, BPH, CHF, LBBB, vertigo, HLD, 5.5 cm AAA without rupture post EVAR, paroxysmal afib on Eliquis, early stage Alzheimer's dementia, presented to [facility] 11/10/23 s/p unwitnessed fall with head strike at home found by wife, found with multicompartmental ICH.    Interval HPI:  11/10: S/p R craniectomy hospital course significant for Klebsiella pneumonia, hypernatremia, lethargy, LUE DVT, new anterior falcine SDH.  12/7: S/p R cranioplasty, CTH s/p starting Eliquis showed new R frontal ICH. 4hr repeat CTH showed worsening R frontal ICH.   12/8: Repeat CTH stable and patient was downgraded from NSICU.  12/21: RRT called, Patient became unresponsive and the patient was subsequently intubated and upgraded to MICU for septic shock and respiratory failure secondary to aspiration event.  12/23: Hgb dropped from 8.7 to 6.8 overnight. Given 2 PRBCs. No ASA or AC for now.    Plan:  - Q2h neuro checks  - Will continue to monitor incision  - Keppra 1000 BID, EEG results shows no seizure activity   - Minimize sedation for accurate and consistent exams  - Hold ASA and chemoprophylaxis for DVT in the setting of acute anemia with multiple bleed events  - Further supportive/medical management per primary team  - Will continue to follow  - Discussed with attending   83M w/ PMH HTN, CAD s/p stents on ASA/Plavix, RCC s/p L nephrectomy, bladder CA, BPH, CHF, LBBB, vertigo, HLD, 5.5 cm AAA without rupture post EVAR, paroxysmal afib on Eliquis, early stage Alzheimer's dementia, presented to [facility] 11/10/23 s/p unwitnessed fall with head strike at home found by wife, found with multicompartmental ICH.    Interval HPI:  11/10: S/p R craniectomy hospital course significant for Klebsiella pneumonia, hypernatremia, lethargy, LUE DVT, new anterior falcine SDH.  12/7: S/p R cranioplasty, CTH s/p starting Eliquis showed new R frontal ICH. 4hr repeat CTH showed worsening R frontal ICH.   12/8: Repeat CTH stable and patient was downgraded from NSICU.  12/21: RRT called, Patient became unresponsive and the patient was subsequently intubated and upgraded to MICU for septic shock and respiratory failure secondary to aspiration event.  12/23: Hgb dropped from 8.7 to 6.8 overnight. Given 2 PRBCs. No ASA or AC for now.    Plan:  - Q2h neuro checks  - Will continue to monitor incision  - Keppra 1000 BID, EEG results shows no seizure activity   - Minimize sedation for accurate and consistent exams  - Hold ASA and chemoprophylaxis for DVT in the setting of acute anemia with multiple bleed events  - Further supportive/medical management per primary team  - Will continue to follow  - Discussed with attending

## 2023-12-23 NOTE — EEG REPORT - NS EEG TEXT BOX
Problem: Pain - Adult  Goal: Verbalizes/displays adequate comfort level or baseline comfort level  Description: INTERVENTIONS:  1. Encourage patient to monitor pain and request interventions  2. Assess pain using the appropriate pain scale  3. Administer analgesics based on type and severity of pain and evaluate response  4. Educate/Implement non-pharmacological measures as appropriate and evaluate response  5. Consider cultural, developmental and social influences on pain and pain management  6. Notify Provider if interventions unsuccessful or patient reports new pain  Outcome: Progressing  Flowsheets (Taken 1/4/2021 2229)  Verbalizes/displays adequate comfort level or baseline comfort level:   Assess pain using the appropriate pain scale   Encourage patient to monitor pain and request interventions     Problem: Infection - Adult  Goal: Absence of infection during hospitalization  Description: INTERVENTIONS:  1. Assess and monitor for signs and symptoms of infection  2. Monitor lab/diagnostic results  3. Monitor all insertion sites/wounds/incisions  4. Monitor secretions for changes in amount and color  5. Administer medications as ordered  6. Educate and encourage patient and family to use good hand hygiene technique  7. Identify and educate in appropriate isolation precautions for identified infection/condition  Outcome: Progressing  Flowsheets (Taken 1/4/2021 2229)  Absence of infection during hospitalization:   Monitor lab/diagnostic results   Monitor all insertion sites/wounds/incisions   Administer medications as ordered     Problem: Safety Adult - Fall  Goal: Free from fall injury  Description: INTERVENTIONS:    Inpatient - Please reference Cares/Safety Flowsheet under [place] Fall Risk for interventions.  Pediatrics - Please reference Peds Daily Cares/Safety Flowsheet under [place] Pediatric Fall Assessment Fall Bundle for interventions  LD/OB - Please reference OB Shift Screening Flowsheet under OB Fall  [facility]   COMPREHENSIVE EPILEPSY CENTER   REPORT OF CONTINUOUS VIDEO EEG     [facility]: [address], [place] [address], Ph#: [phone]  [facility]: [id number], [place] [address], Ph#: [phone]  [facility]: [address] [place] [address], Ph#: [phone]    Patient Name: JAMARI BALL  Age and : 83y ([birth year])  MRN #: [id number]  Location: [place] [address]  Referring Physician: Jamari Flores    Start Time/Date: 0800 on 2023  End Time/Date: 08:00 on 23  Duration: 24H    _____________________________________________________________  STUDY INFORMATION    EEG Recording Technique:  The patient underwent continuous Video-EEG monitoring, using Telemetry System hardware on the XLTek Digital System. EEG and video data were stored on a computer hard drive with important events saved in digital archive files. The material was reviewed by a physician (electroencephalographer / epileptologist) on a daily basis. Taras and seizure detection algorithms were utilized and reviewed. An EEG Technician attended to the patient, and was available throughout daytime work hours.  The epilepsy center neurologist was available in person or on call 24-hours per day.    EEG Placement and Labeling of Electrodes:  The EEG was performed utilizing 20 channel referential EEG connections (coronal over temporal over parasagittal montage) using all standard 10-20 electrode placements with EKG, with additional electrodes placed in the inferior temporal region using the modified 10-10 montage electrode placements for elective admissions, or if deemed necessary. Recording was at a sampling rate of 256 samples per second per channel. Time synchronized digital video recording was done simultaneously with EEG recording. A low light infrared camera was used for low light recording.     _____________________________________________________________  HISTORY    Patient is a 83y old  Male who presents with a chief complaint of s/p unwitnessed fall with head strike (21 Dec 2023 20:07)      PERTINENT MEDICATION:  MEDICATIONS  (STANDING):  atorvastatin 80 milliGRAM(s) Oral at bedtime  chlorhexidine 2% Cloths 1 Application(s) Topical <User Schedule>  doxazosin 2 milliGRAM(s) Oral at bedtime  folic acid 1 milliGRAM(s) Oral daily  hydrocortisone sodium succinate Injectable 100 milliGRAM(s) IV Push every 8 hours  lactated ringers. 1000 milliLiter(s) (75 mL/Hr) IV Continuous <Continuous>  levETIRAcetam  IVPB 500 milliGRAM(s) IV Intermittent every 12 hours  memantine 5 milliGRAM(s) Oral two times a day  meropenem Injectable 1000 milliGRAM(s) IV Push every 8 hours  Nephro-roma 1 Tablet(s) Oral daily  norepinephrine Infusion 0.7 MICROgram(s)/kG/Min (47.7 mL/Hr) IV Continuous <Continuous>  pantoprazole  Injectable 40 milliGRAM(s) IV Push every 12 hours  polyethylene glycol 3350 17 Gram(s) Oral daily  propofol Infusion 10 MICROgram(s)/kG/Min (4.37 mL/Hr) IV Continuous <Continuous>  senna 2 Tablet(s) Oral at bedtime  vancomycin  IVPB 1000 milliGRAM(s) IV Intermittent <User Schedule>  vasopressin Infusion 0.04 Unit(s)/Min (6 mL/Hr) IV Continuous <Continuous>    _____________________________________________________________  STUDY INTERPRETATION    Findings: The background was continuous, spontaneously variable and reactive.   No posterior dominant rhythm seen.  Background predominantly consisted of theta, delta and faster activities.    Focal Slowing:   Near continuous theta/delta slowing in the right anterior head region.    Sleep Background:  Drowsiness and stage II sleep transients were not recorded.    Other Non-Epileptiform Findings:  Breach effect over the right hemisphere characterized by higher amplitude, sharply contoured waves and fast activities.    Interictal Epileptiform Activity:   Occasional right fronto-temporal sharp-wave discharges (max F8).    Events:  Clinical events: None recorded.  Seizures: None recorded.    Activation Procedures:   Hyperventilation was not performed.    Photic stimulation was not performed.     Artifacts:  Intermittent myogenic and movement artifacts were noted.    _____________________________________________________________  EEG SUMMARY/CLASSIFICATION    Abnormal EEG   - Right anterior slowing and sharp-wave discharges  - Moderate generalized slowing.  - Right hemispheric breach artifact.  _____________________________________________________________  EEG IMPRESSION/CLINICAL CORRELATE    Abnormal EEG study.  Potential epileptogenic focus in the right anterior head region with underlying cerebral dysfunction and evidence for overlying skull defect  Moderate nonspecific diffuse or multifocal cerebral dysfunction.   No seizure seen.  _____________________________________________________________    **In absence of additional clinical concerns, recommend consideration for discontinuation of current EEG study with reconnection in future if warranted.    Brendon Chatman MD   of Neurology  Epilepsy/EEG Attending   Risk for interventions.  Outcome: Progressing     Problem: Discharge Planning  Goal: Discharge to home or other facility with appropriate resources  Description: INTERVENTIONS:  1. Identify and discuss barriers to discharge with patient and caregiver.  2. Arrange for needed discharge resources and transportation as appropriate.  3. Identify discharge learning needs (meds, wound care, etc).  4. Arrange for interpreters to assist at discharge as needed.  5. Refer to  for coordinating discharge planning if the patient needs post-hospital services based on physician order or complex needs related to functional status, cognitive ability or social support system.  Outcome: Progressing  Flowsheets (Taken 1/4/2021 2229)  Discharge to home or other facility with appropriate resources: Identify discharge learning needs (meds, wound care, etc)     Problem: Knowledge Deficit  Goal: Patient/family/caregiver demonstrates understanding of disease process, treatment plan, medications, and discharge instructions  Description: INTERVENTIONS:   1. Complete learning assessment and assess knowledge base  2. Provide teaching at level of understanding   3. Provide teaching via preferred learning methods  Outcome: Progressing  Flowsheets (Taken 1/4/2021 2229)  Patient/family/caregiver demonstrates understanding of disease process, treatment plan, medications, and discharge instructions: Provide teaching at level of understanding     Problem: Potential for Compromised Skin Integrity  Goal: Skin Integrity is Maintained or Improved  Description: INTERVENTIONS:  1. Assess and monitor skin integrity  2. Collaborate with interdisciplinary team and initiate plans and interventions as needed  3. Alternate a full bath with partial baths for elderly   4. Monitor patient's hygiene practices   5. Collaborate with wound, ostomy, and continence nurse  Outcome: Progressing  Flowsheets (Taken 1/4/2021 2229)  Skin integrity is maintained or  [facility]   COMPREHENSIVE EPILEPSY CENTER   REPORT OF CONTINUOUS VIDEO EEG     [facility]: [address], [place] [address], Ph#: [phone]  [facility]: [phone], [place] [address], Ph#: [phone]  [facility]: [address] [place] [address], Ph#: [phone]    Patient Name: JAMARI BALL  Age and : 83y ([birth year])  MRN #: [id number]  Location: [place] [address]  Referring Physician: Jamari Flores    Start Time/Date: 0800 on 2023  End Time/Date: 08:00 on 23  Duration: 24H    _____________________________________________________________  STUDY INFORMATION    EEG Recording Technique:  The patient underwent continuous Video-EEG monitoring, using Telemetry System hardware on the XLTek Digital System. EEG and video data were stored on a computer hard drive with important events saved in digital archive files. The material was reviewed by a physician (electroencephalographer / epileptologist) on a daily basis. Taras and seizure detection algorithms were utilized and reviewed. An EEG Technician attended to the patient, and was available throughout daytime work hours.  The epilepsy center neurologist was available in person or on call 24-hours per day.    EEG Placement and Labeling of Electrodes:  The EEG was performed utilizing 20 channel referential EEG connections (coronal over temporal over parasagittal montage) using all standard 10-20 electrode placements with EKG, with additional electrodes placed in the inferior temporal region using the modified 10-10 montage electrode placements for elective admissions, or if deemed necessary. Recording was at a sampling rate of 256 samples per second per channel. Time synchronized digital video recording was done simultaneously with EEG recording. A low light infrared camera was used for low light recording.     _____________________________________________________________  HISTORY    Patient is a 83y old  Male who presents with a chief complaint of s/p unwitnessed fall with head strike (21 Dec 2023 20:07)      PERTINENT MEDICATION:  MEDICATIONS  (STANDING):  atorvastatin 80 milliGRAM(s) Oral at bedtime  chlorhexidine 2% Cloths 1 Application(s) Topical <User Schedule>  doxazosin 2 milliGRAM(s) Oral at bedtime  folic acid 1 milliGRAM(s) Oral daily  hydrocortisone sodium succinate Injectable 100 milliGRAM(s) IV Push every 8 hours  lactated ringers. 1000 milliLiter(s) (75 mL/Hr) IV Continuous <Continuous>  levETIRAcetam  IVPB 500 milliGRAM(s) IV Intermittent every 12 hours  memantine 5 milliGRAM(s) Oral two times a day  meropenem Injectable 1000 milliGRAM(s) IV Push every 8 hours  Nephro-roma 1 Tablet(s) Oral daily  norepinephrine Infusion 0.7 MICROgram(s)/kG/Min (47.7 mL/Hr) IV Continuous <Continuous>  pantoprazole  Injectable 40 milliGRAM(s) IV Push every 12 hours  polyethylene glycol 3350 17 Gram(s) Oral daily  propofol Infusion 10 MICROgram(s)/kG/Min (4.37 mL/Hr) IV Continuous <Continuous>  senna 2 Tablet(s) Oral at bedtime  vancomycin  IVPB 1000 milliGRAM(s) IV Intermittent <User Schedule>  vasopressin Infusion 0.04 Unit(s)/Min (6 mL/Hr) IV Continuous <Continuous>    _____________________________________________________________  STUDY INTERPRETATION    Findings: The background was continuous, spontaneously variable and reactive.   No posterior dominant rhythm seen.  Background predominantly consisted of theta, delta and faster activities.    Focal Slowing:   Near continuous theta/delta slowing in the right anterior head region.    Sleep Background:  Drowsiness and stage II sleep transients were not recorded.    Other Non-Epileptiform Findings:  Breach effect over the right hemisphere characterized by higher amplitude, sharply contoured waves and fast activities.    Interictal Epileptiform Activity:   Occasional right fronto-temporal sharp-wave discharges (max F8).    Events:  Clinical events: None recorded.  Seizures: None recorded.    Activation Procedures:   Hyperventilation was not performed.    Photic stimulation was not performed.     Artifacts:  Intermittent myogenic and movement artifacts were noted.    _____________________________________________________________  EEG SUMMARY/CLASSIFICATION    Abnormal EEG   - Right anterior slowing and sharp-wave discharges  - Moderate generalized slowing.  - Right hemispheric breach artifact.  _____________________________________________________________  EEG IMPRESSION/CLINICAL CORRELATE    Abnormal EEG study.  Potential epileptogenic focus in the right anterior head region with underlying cerebral dysfunction and evidence for overlying skull defect  Moderate nonspecific diffuse or multifocal cerebral dysfunction.   No seizure seen.  _____________________________________________________________    **In absence of additional clinical concerns, recommend consideration for discontinuation of current EEG study with reconnection in future if warranted.    Brendon Chatman MD   of Neurology  Epilepsy/EEG Attending   improved:   Monitor patient's hygiene practices   Assess and monitor skin integrity  Goal: Nutritional status is improving  Description: INTERVENTIONS:  1. Monitor and assess patient for malnutrition (ex- brittle hair, bruises, dry skin, pale skin and conjunctiva, muscle wasting, smooth red tongue, and disorientation)  2. Monitor patient's weight and dietary intake as ordered or per policy  3. Determine patient's food preferences and provide high-protein, high-caloric foods as appropriate  4. Assist patient with eating   5. Allow adequate time for meals   6. Encourage patient to take dietary supplement as ordered   7. Collaborate with dietitian  8. Include patient/family/caregiver in decisions related to nutrition  Outcome: Progressing  Flowsheets (Taken 1/4/2021 2229)  Nutritional status is improving: Allow adequate time for meals  Goal: MOBILITY IS MAINTAINED OR IMPROVED  Description: INTERVENTIONS  1. Collaborate with interdisciplinary team and initiate plan and interventions as ordered (PT/OT)  2. Encourage ambulation  3. Up to chair for meals  4. Monitor for signs of deconditioning  Outcome: Progressing  Flowsheets (Taken 1/4/2021 2229)  Mobility is Maintained or Improved: Encourage ambulation     Problem: Urinary Incontinence  Goal: Perineal skin integrity is maintained or improved  Description: INTERVENTIONS:  1. Assess genitourinary system, perineal skin, labs (urinalysis), and history of incontinence to include past management, aggravating, and alleviating factors  2. Collaborate with interdisciplinary team including wound, ostomy, and continence nurse and initiate plans and interventions as needed  4. Consider urine containment device  5. Apply skin protectant   6. Develop skin care regimen  7. Provide privacy when changing patient's incontinence device to maintain their dignity  Outcome: Progressing  Flowsheets (Taken 1/4/2021 2229)  Perineal skin integrity is maintained or improved:   Consider urine  containment device   Develop skin care regimen

## 2023-12-23 NOTE — PROGRESS NOTE ADULT - SUBJECTIVE AND OBJECTIVE BOX
HPI:  82y M with PMH HTN, CAD s/p stents, renal cell carcinoma status post left nephrectomy, bladder CA, BPH, CHF, LBBB, vertigo,  HLD, 5.5cm AAA without rupture post EVAR, paroxysmal A-fib on Eliquis, Plavix, and aspirin, early stage Alzheimer dementia transferred from [facility] s/p unwitnessed fall with head strike at home found by wife on floor at 8am. Patient brought to urgent care by wife and had 5 staples to posterior scalp but was instructed to go to nearest ED.  CT head at [place] showed R frontal IPH, SDH, SAH at 9:00. CTA stroke protocol not performed at [facility]. Patient BIB on 6L NC.  Pt GCS 15 on arrival, A&Ox2 on arrival. After initial assessment, patient noted to be vomiting enroute to CT scanner. (10 Nov 2023 11:04)    INTERVAL HPI/OVERNIGHT EVENTS:  12/21: Rapid response called, reupgraded to MICU for respiratory failure secondary to aspiration and septic shock on 2 pressors. LP with opening pressure of [id number] and 9cc taken off. Post LP head CT stable. Patient exam about the same, require stimulation to wake and weakly follows commands on the right, WD on the left.   12/22: EEG, no seizures.   12/23: Hgb dropped from 8.7 to 6.8 overnight. Given 2 PRBCs. No ASA or AC for now.    Vital Signs Last 24 Hrs  T(C): 38.1 (23 Dec 2023 16:45), Max: 38.2 (23 Dec 2023 14:45)  T(F): 100.6 (23 Dec 2023 16:45), Max: 100.8 (23 Dec 2023 14:45)  HR: 135 (23 Dec 2023 16:45) (73 - 154)  BP: 136/54 (23 Dec 2023 16:00) (100/54 - 153/59)  BP(mean): 79 (23 Dec 2023 16:00) (69 - 85)  RR: 29 (23 Dec 2023 16:45) (18 - 30)  SpO2: 98% (23 Dec 2023 16:45) (98% - 100%)  Parameters below as of 23 Dec 2023 16:00  Patient On (Oxygen Delivery Method): ventilator  O2 Concentration (%): 30    PHYSICAL EXAM:  GENERAL: NAD, well-groomed  HEAD: S/p R cranioplasty, staples clean dry intact; head wrapped with kerlex; dressing clean/dry  JOEY COMA SCORE: E-1 V-1T M-5 = 7T  MENTAL STATUS: Intubated, sedated on propofol; no eye opening; not following commands  CRANIAL NERVES: Pupils 2mm briskly reactive b/l. Face symmetric.  REFLEXES: PERRL. Corneals intact b/l. Gag intact. Cough intact.   MOTOR: Localizing RUE to noxious. RLE WD. LUE/LLE 0/5 on sedation.   CHEST/LUNG:  SKIN: Warm, dry    LABS:                        6.7    5.64  )-----------( 147      ( 23 Dec 2023 12:05 )             20.0     12-23    137  |  103  |  45.7<H>  ----------------------------<  135<H>  3.5   |  18.0<L>  |  1.48<H>    Ca    8.3<L>      23 Dec 2023 12:05  Phos  4.3     12-23  Mg     2.4     12-23    TPro  5.1<L>  /  Alb  2.5<L>  /  TBili  0.3<L>  /  DBili  x   /  AST  57<H>  /  ALT  43<H>  /  AlkPhos  80  12-23    PT/INR - ( 23 Dec 2023 03:45 )   PT: 15.2 sec;   INR: 1.38 ratio    PTT - ( 23 Dec 2023 03:45 )  PTT:38.8 sec    Urinalysis Basic - ( 23 Dec 2023 12:05 )  Color: x / Appearance: x / SG: x / pH: x  Gluc: 135 mg/dL / Ketone: x  / Bili: x / Urobili: x   Blood: x / Protein: x / Nitrite: x   Leuk Esterase: x / RBC: x / WBC x   Sq Epi: x / Non Sq Epi: x / Bacteria: x    12-22 @ 07:01  -  12-23 @ 07:00  --------------------------------------------------------  IN: 2248 mL / OUT: 990 mL / NET: 1258 mL    12-23 @ 07:01  -  12-23 @ 17:09  --------------------------------------------------------  IN: 1468.9 mL / OUT: 625 mL / NET: 843.9 mL      RADIOLOGY & ADDITIONAL TESTS:    CT Head No Cont (12.22.23 @ 03:51)   IMPRESSION:  Interval decrease in size of the simple density fluid collection, likely   either a pseudomeningocele or seroma, superficial to the right-sided   cranioplasty.  No other significant change.    CT Head No Cont (12.21.23 @ 08:24)  *** ADDENDUM # 1 ***  Extracalvarial collection is again seen in the postoperative region. This   is compatible with a pseudomeningocele. This finding measures   approximately 1.3 cm widest diameter and previously measured   approximately 1.1 cm in widest diameter.         HPI:  82y M with PMH HTN, CAD s/p stents, renal cell carcinoma status post left nephrectomy, bladder CA, BPH, CHF, LBBB, vertigo,  HLD, 5.5cm AAA without rupture post EVAR, paroxysmal A-fib on Eliquis, Plavix, and aspirin, early stage Alzheimer dementia transferred from [facility] s/p unwitnessed fall with head strike at home found by wife on floor at 8am. Patient brought to urgent care by wife and had 5 staples to posterior scalp but was instructed to go to nearest ED.  CT head at [place] showed R frontal IPH, SDH, SAH at 9:00. CTA stroke protocol not performed at [facility]. Patient BIB on 6L NC.  Pt GCS 15 on arrival, A&Ox2 on arrival. After initial assessment, patient noted to be vomiting enroute to CT scanner. (10 Nov 2023 11:04)    INTERVAL HPI/OVERNIGHT EVENTS:  12/21: Rapid response called, reupgraded to MICU for respiratory failure secondary to aspiration and septic shock on 2 pressors. LP with opening pressure of [id number] and 9cc taken off. Post LP head CT stable. Patient exam about the same, require stimulation to wake and weakly follows commands on the right, WD on the left.   12/22: EEG, no seizures.   12/23: Hgb dropped from 8.7 to 6.8 overnight. Given 2 PRBCs. No ASA or AC for now.    Vital Signs Last 24 Hrs  T(C): 38.1 (23 Dec 2023 16:45), Max: 38.2 (23 Dec 2023 14:45)  T(F): 100.6 (23 Dec 2023 16:45), Max: 100.8 (23 Dec 2023 14:45)  HR: 135 (23 Dec 2023 16:45) (73 - 154)  BP: 136/54 (23 Dec 2023 16:00) (100/54 - 153/59)  BP(mean): 79 (23 Dec 2023 16:00) (69 - 85)  RR: 29 (23 Dec 2023 16:45) (18 - 30)  SpO2: 98% (23 Dec 2023 16:45) (98% - 100%)  Parameters below as of 23 Dec 2023 16:00  Patient On (Oxygen Delivery Method): ventilator  O2 Concentration (%): 30    PHYSICAL EXAM:  GENERAL: NAD, well-groomed  HEAD: S/p R cranioplasty, staples clean dry intact; head wrapped with kerlex; dressing clean/dry  JOEY COMA SCORE: E-1 V-1T M-5 = 7T  MENTAL STATUS: Intubated, sedated on propofol; no eye opening; not following commands  CRANIAL NERVES: Pupils 2mm briskly reactive b/l. Face symmetric.  REFLEXES: PERRL. Corneals intact b/l. Gag intact. Cough intact.   MOTOR: Localizing RUE to noxious. RLE WD. LUE/LLE 0/5 on sedation.   CHEST/LUNG:  SKIN: Warm, dry    LABS:                        6.7    5.64  )-----------( 147      ( 23 Dec 2023 12:05 )             20.0     12-23    137  |  103  |  45.7<H>  ----------------------------<  135<H>  3.5   |  18.0<L>  |  1.48<H>    Ca    8.3<L>      23 Dec 2023 12:05  Phos  4.3     12-23  Mg     2.4     12-23    TPro  5.1<L>  /  Alb  2.5<L>  /  TBili  0.3<L>  /  DBili  x   /  AST  57<H>  /  ALT  43<H>  /  AlkPhos  80  12-23    PT/INR - ( 23 Dec 2023 03:45 )   PT: 15.2 sec;   INR: 1.38 ratio    PTT - ( 23 Dec 2023 03:45 )  PTT:38.8 sec    Urinalysis Basic - ( 23 Dec 2023 12:05 )  Color: x / Appearance: x / SG: x / pH: x  Gluc: 135 mg/dL / Ketone: x  / Bili: x / Urobili: x   Blood: x / Protein: x / Nitrite: x   Leuk Esterase: x / RBC: x / WBC x   Sq Epi: x / Non Sq Epi: x / Bacteria: x    12-22 @ 07:01  -  12-23 @ 07:00  --------------------------------------------------------  IN: 2248 mL / OUT: 990 mL / NET: 1258 mL    12-23 @ 07:01  -  12-23 @ 17:09  --------------------------------------------------------  IN: 1468.9 mL / OUT: 625 mL / NET: 843.9 mL      RADIOLOGY & ADDITIONAL TESTS:    CT Head No Cont (12.22.23 @ 03:51)   IMPRESSION:  Interval decrease in size of the simple density fluid collection, likely   either a pseudomeningocele or seroma, superficial to the right-sided   cranioplasty.  No other significant change.    CT Head No Cont (12.21.23 @ 08:24)  *** ADDENDUM # 1 ***  Extracalvarial collection is again seen in the postoperative region. This   is compatible with a pseudomeningocele. This finding measures   approximately 1.3 cm widest diameter and previously measured   approximately 1.1 cm in widest diameter.         HPI:  82y M with PMH HTN, CAD s/p stents, renal cell carcinoma status post left nephrectomy, bladder CA, BPH, CHF, LBBB, vertigo,  HLD, 5.5cm AAA without rupture post EVAR, paroxysmal A-fib on Eliquis, Plavix, and aspirin, early stage Alzheimer dementia transferred from [facility] s/p unwitnessed fall with head strike at home found by wife on floor at 8am. Patient brought to urgent care by wife and had 5 staples to posterior scalp but was instructed to go to nearest ED.  CT head at [place] showed R frontal IPH, SDH, SAH at 9:00. CTA stroke protocol not performed at [facility]. Patient BIB on 6L NC.  Pt GCS 15 on arrival, A&Ox2 on arrival. After initial assessment, patient noted to be vomiting enroute to CT scanner. (10 Nov 2023 11:04)    INTERVAL HPI/OVERNIGHT EVENTS:  11/10: S/p R craniectomy hospital course significant for Klebsiella pneumonia, hypernatremia, lethargy, LUE DVT, new anterior falcine SDH.  12/7: S/p R cranioplasty, CTH s/p starting Eliquis showed new R frontal ICH. 4hr repeat CTH showed worsening R frontal ICH.   12/8: Repeat CTH stable and patient was downgraded from NSICU.  12/21: Rapid response called, reupgraded to MICU for respiratory failure secondary to aspiration and septic shock on 2 pressors. LP with opening pressure of [id number] and 9cc taken off. Post LP head CT stable. Patient exam about the same, require stimulation to wake and weakly follows commands on the right, WD on the left.   12/22: EEG, no seizures.   12/23: Hgb dropped from 8.7 to 6.8 overnight. Given 2 PRBCs. No ASA or AC for now.    Vital Signs Last 24 Hrs  T(C): 38.1 (23 Dec 2023 16:45), Max: 38.2 (23 Dec 2023 14:45)  T(F): 100.6 (23 Dec 2023 16:45), Max: 100.8 (23 Dec 2023 14:45)  HR: 135 (23 Dec 2023 16:45) (73 - 154)  BP: 136/54 (23 Dec 2023 16:00) (100/54 - 153/59)  BP(mean): 79 (23 Dec 2023 16:00) (69 - 85)  RR: 29 (23 Dec 2023 16:45) (18 - 30)  SpO2: 98% (23 Dec 2023 16:45) (98% - 100%)  Parameters below as of 23 Dec 2023 16:00  Patient On (Oxygen Delivery Method): ventilator  O2 Concentration (%): 30    PHYSICAL EXAM:  GENERAL: NAD, well-groomed  HEAD: S/p R cranioplasty, staples clean dry intact; head wrapped with kerlex; dressing clean/dry  JOEY COMA SCORE: E-1 V-1T M-5 = 7T  MENTAL STATUS: Intubated, sedated on propofol; no eye opening; not following commands  CRANIAL NERVES: Pupils 2mm reactive b/l. Face symmetric.  MOTOR: Localizing RUE to noxious. RLE WD. LUE/LLE 0/5 on sedation.   CHEST/LUNG: Intubated on ventilator  SKIN: Warm, dry    LABS:                        6.7    5.64  )-----------( 147      ( 23 Dec 2023 12:05 )             20.0     12-23    137  |  103  |  45.7<H>  ----------------------------<  135<H>  3.5   |  18.0<L>  |  1.48<H>    Ca    8.3<L>      23 Dec 2023 12:05  Phos  4.3     12-23  Mg     2.4     12-23    TPro  5.1<L>  /  Alb  2.5<L>  /  TBili  0.3<L>  /  DBili  x   /  AST  57<H>  /  ALT  43<H>  /  AlkPhos  80  12-23    PT/INR - ( 23 Dec 2023 03:45 )   PT: 15.2 sec;   INR: 1.38 ratio    PTT - ( 23 Dec 2023 03:45 )  PTT:38.8 sec    Urinalysis Basic - ( 23 Dec 2023 12:05 )  Color: x / Appearance: x / SG: x / pH: x  Gluc: 135 mg/dL / Ketone: x  / Bili: x / Urobili: x   Blood: x / Protein: x / Nitrite: x   Leuk Esterase: x / RBC: x / WBC x   Sq Epi: x / Non Sq Epi: x / Bacteria: x    12-22 @ 07:01  -  12-23 @ 07:00  --------------------------------------------------------  IN: 2248 mL / OUT: 990 mL / NET: 1258 mL    12-23 @ 07:01  -  12-23 @ 17:09  --------------------------------------------------------  IN: 1468.9 mL / OUT: 625 mL / NET: 843.9 mL      RADIOLOGY & ADDITIONAL TESTS:    CT Head No Cont (12.22.23 @ 03:51)   IMPRESSION:  Interval decrease in size of the simple density fluid collection, likely   either a pseudomeningocele or seroma, superficial to the right-sided   cranioplasty.  No other significant change.    CT Head No Cont (12.21.23 @ 08:24)  *** ADDENDUM # 1 ***  Extracalvarial collection is again seen in the postoperative region. This   is compatible with a pseudomeningocele. This finding measures   approximately 1.3 cm widest diameter and previously measured   approximately 1.1 cm in widest diameter.         HPI:  82y M with PMH HTN, CAD s/p stents, renal cell carcinoma status post left nephrectomy, bladder CA, BPH, CHF, LBBB, vertigo,  HLD, 5.5cm AAA without rupture post EVAR, paroxysmal A-fib on Eliquis, Plavix, and aspirin, early stage Alzheimer dementia transferred from [facility] s/p unwitnessed fall with head strike at home found by wife on floor at 8am. Patient brought to urgent care by wife and had 5 staples to posterior scalp but was instructed to go to nearest ED.  CT head at [place] showed R frontal IPH, SDH, SAH at 9:00. CTA stroke protocol not performed at [facility]. Patient BIB on 6L NC.  Pt GCS 15 on arrival, A&Ox2 on arrival. After initial assessment, patient noted to be vomiting enroute to CT scanner. (10 Nov 2023 11:04)    INTERVAL HPI/OVERNIGHT EVENTS:  11/10: S/p R craniectomy hospital course significant for Klebsiella pneumonia, hypernatremia, lethargy, LUE DVT, new anterior falcine SDH.  12/7: S/p R cranioplasty, CTH s/p starting Eliquis showed new R frontal ICH. 4hr repeat CTH showed worsening R frontal ICH.   12/8: Repeat CTH stable and patient was downgraded from NSICU.  12/21: Rapid response called, reupgraded to MICU for respiratory failure secondary to aspiration and septic shock on 2 pressors. LP with opening pressure of [id number] and 9cc taken off. Post LP head CT stable. Patient exam about the same, require stimulation to wake and weakly follows commands on the right, WD on the left.   12/22: EEG, no seizures.   12/23: Hgb dropped from 8.7 to 6.8 overnight. Given 2 PRBCs. No ASA or AC for now.    Vital Signs Last 24 Hrs  T(C): 38.1 (23 Dec 2023 16:45), Max: 38.2 (23 Dec 2023 14:45)  T(F): 100.6 (23 Dec 2023 16:45), Max: 100.8 (23 Dec 2023 14:45)  HR: 135 (23 Dec 2023 16:45) (73 - 154)  BP: 136/54 (23 Dec 2023 16:00) (100/54 - 153/59)  BP(mean): 79 (23 Dec 2023 16:00) (69 - 85)  RR: 29 (23 Dec 2023 16:45) (18 - 30)  SpO2: 98% (23 Dec 2023 16:45) (98% - 100%)  Parameters below as of 23 Dec 2023 16:00  Patient On (Oxygen Delivery Method): ventilator  O2 Concentration (%): 30    PHYSICAL EXAM:  GENERAL: NAD, well-groomed  HEAD: S/p R cranioplasty, staples clean dry intact; head wrapped with kerlex; dressing clean/dry  JOEY COMA SCORE: E-1 V-1T M-5 = 7T  MENTAL STATUS: Intubated, sedated on propofol; no eye opening; not following commands  CRANIAL NERVES: Pupils 2mm reactive b/l. Face symmetric.  MOTOR: Localizing RUE to noxious. RLE WD. LUE/LLE 0/5 on sedation.   CHEST/LUNG: Intubated on ventilator  SKIN: Warm, dry    LABS:                        6.7    5.64  )-----------( 147      ( 23 Dec 2023 12:05 )             20.0     12-23    137  |  103  |  45.7<H>  ----------------------------<  135<H>  3.5   |  18.0<L>  |  1.48<H>    Ca    8.3<L>      23 Dec 2023 12:05  Phos  4.3     12-23  Mg     2.4     12-23    TPro  5.1<L>  /  Alb  2.5<L>  /  TBili  0.3<L>  /  DBili  x   /  AST  57<H>  /  ALT  43<H>  /  AlkPhos  80  12-23    PT/INR - ( 23 Dec 2023 03:45 )   PT: 15.2 sec;   INR: 1.38 ratio    PTT - ( 23 Dec 2023 03:45 )  PTT:38.8 sec    Urinalysis Basic - ( 23 Dec 2023 12:05 )  Color: x / Appearance: x / SG: x / pH: x  Gluc: 135 mg/dL / Ketone: x  / Bili: x / Urobili: x   Blood: x / Protein: x / Nitrite: x   Leuk Esterase: x / RBC: x / WBC x   Sq Epi: x / Non Sq Epi: x / Bacteria: x    12-22 @ 07:01  -  12-23 @ 07:00  --------------------------------------------------------  IN: 2248 mL / OUT: 990 mL / NET: 1258 mL    12-23 @ 07:01  -  12-23 @ 17:09  --------------------------------------------------------  IN: 1468.9 mL / OUT: 625 mL / NET: 843.9 mL      RADIOLOGY & ADDITIONAL TESTS:    CT Head No Cont (12.22.23 @ 03:51)   IMPRESSION:  Interval decrease in size of the simple density fluid collection, likely   either a pseudomeningocele or seroma, superficial to the right-sided   cranioplasty.  No other significant change.    CT Head No Cont (12.21.23 @ 08:24)  *** ADDENDUM # 1 ***  Extracalvarial collection is again seen in the postoperative region. This   is compatible with a pseudomeningocele. This finding measures   approximately 1.3 cm widest diameter and previously measured   approximately 1.1 cm in widest diameter.

## 2023-12-23 NOTE — PROGRESS NOTE ADULT - SUBJECTIVE AND OBJECTIVE BOX
Patient is a 83y old  Male who presents with a chief complaint of s/p unwitnessed fall with head strike (22 Dec 2023 16:17)      BRIEF HOSPITAL COURSE: 84 y/o M with a h/o HTN, CAD s/p PCO, RCC s/p left nephrectomy, bladder CA, BPH, CHF, Vertigo, HLD, AAA s/p EVAR, parox Afib, h/o HIT, Alzheimer's, initially admitted on 11/10 s/p fall with Rt frontal IPH. Underwent R hemicraniectomy on 11/10 with drain placement. Hospital course complicated by AFib with RVR, Klebsiella PNA, hypoxic respiratory failure, LUE DVT and questionable increase in AAA size. Started on AC with waxing/waning mental status prompting head imaging with evidence of new bleed, so systemic A/C held. TTE showed mobile echodensity on aortic valve and strokes noted within multiple arterial territories, WHIT was recommended but family declined. Pt underwent cranioplasty on , and had subgaleal drain removed 12/10. ABx started for UTI. Pt re-upgraded to NSICU on 12/15 given worsening R frontal IPH, given Andexxa for reversal of NOAC, and subsequently downgraded on . Transferred to MICU on  after developing altered mentation with vomiting and aspiration of dark liquid stomach content. Subsequently developed septic shock state requiring dual IV vasopressor support.    Events last 24 hours: Intermittent episodes of afib RVR (150-160s). Switched to phenylephrine and added digoxin, LR bolus and albumin. Morning labs with hgb 6.8, unit of prbc ordered.     PAST MEDICAL & SURGICAL HISTORY:  HTN  dx       Bladder Cancer (ICD9 188.9)  TCC, dx       Hyperlipemia (ICD9 272.4)  dx       Lt Renal Neoplasm  left - s/p left nephrectomy      Hx antineoplastic chemotherapy (BCG )      Left bundle branch block      Vertigo      Heel spur, left      Abdominal aortic aneurysm (AAA)  5.5 cm      BPH (benign prostatic hyperplasia)      Renal mass, right  current - following with Dr Pamela SHEARER (coronary artery disease)      Bladder cancer, primary, with metastasis from bladder to other site  Left kidney      Acute renal failure, unspecified acute renal failure type      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)      Abdominal aortic aneurysm (AAA) without rupture      Congestive heart failure, unspecified chronicity, unspecified heart failure type      Hypertension      History of abdominal aortic aneurysm (AAA)      CAD (coronary artery disease)      Alzheimers disease      urethral Stent 2008  stent placement and removal      S/P Cystoscopy  bladder polyps removal multiple times (since ), 6/10 , 10/2011 & 10/17/2011, , 2012, last 13, 2013, 2014      S/P Tonsillectomy      History of Lt  Nephrectomy  6/10 - left      History of cystoscopy  2015      H/O abdominal aortic aneurysm repair      S/P AAA repair      History of nephrectomy  Left      Presence of stent in LAD coronary artery      History of heart artery stent  DIONICIO        Allergies    penicillin (Rash)  heparin (Other (Severe))  penicillin (Hives)  Cipro (Other)  Levaquin (Other)  heparin (Other)    Intolerances      FAMILY HISTORY:  Family history of MI (myocardial infarction) (Father, Mother)  father  74y/o MI, mother  96y/o alzheimers    Family history of early CAD (Father, Mother)        Review of Systems: Unable to obtain due to medical condition       Medications:  meropenem Injectable 1000 milliGRAM(s) IV Push every 12 hours  vancomycin  IVPB 1000 milliGRAM(s) IV Intermittent <User Schedule>    digoxin  Injectable 250 MICROGram(s) IV Push once  doxazosin 2 milliGRAM(s) Oral at bedtime  norepinephrine Infusion 0.7 MICROgram(s)/kG/Min IV Continuous <Continuous>  phenylephrine    Infusion 3 MICROgram(s)/kG/Min IV Continuous <Continuous>      acetaminophen     Tablet .. 650 milliGRAM(s) Oral every 6 hours PRN  levETIRAcetam   Injectable 1000 milliGRAM(s) IV Push every 12 hours  memantine 5 milliGRAM(s) Oral two times a day  propofol Infusion 10 MICROgram(s)/kG/Min IV Continuous <Continuous>        pantoprazole  Injectable 40 milliGRAM(s) IV Push every 12 hours  polyethylene glycol 3350 17 Gram(s) Oral daily  senna 2 Tablet(s) Oral at bedtime      atorvastatin 80 milliGRAM(s) Oral at bedtime  hydrocortisone sodium succinate Injectable 100 milliGRAM(s) IV Push every 8 hours  vasopressin Infusion 0.04 Unit(s)/Min IV Continuous <Continuous>    albumin human 25% IVPB 50 milliLiter(s) IV Intermittent every 4 hours  folic acid 1 milliGRAM(s) Oral daily  Nephro-roma 1 Tablet(s) Oral daily      artificial tears (preservative free) Ophthalmic Solution 1 Drop(s) Both EYES every 6 hours PRN  chlorhexidine 2% Cloths 1 Application(s) Topical <User Schedule>        Mode: AC/ CMV (Assist Control/ Continuous Mandatory Ventilation)  RR (machine): 12  TV (machine): 450  FiO2: 40  PEEP: 8  ITime: 1  MAP: 8  PIP: 14      ICU Vital Signs Last 24 Hrs  T(C): 37.4 (23 Dec 2023 04:45), Max: 38.6 (22 Dec 2023 10:00)  T(F): 99.3 (23 Dec 2023 04:45), Max: 101.5 (22 Dec 2023 10:00)  HR: 83 (23 Dec 2023 04:45) (74 - 154)  BP: 137/47 (23 Dec 2023 04:00) (100/54 - 137/47)  BP(mean): 74 (23 Dec 2023 04:00) (69 - 74)  ABP: 159/48 (23 Dec 2023 04:45) (73/36 - 159/48)  ABP(mean): 87 (23 Dec 2023 04:45) (49 - 87)  RR: 19 (23 Dec 2023 04:45) (18 - 33)  SpO2: 100% (23 Dec 2023 04:45) (100% - 100%)    O2 Parameters below as of 23 Dec 2023 04:00  Patient On (Oxygen Delivery Method): ventilator          Vital Signs Last 24 Hrs  T(C): 37.4 (23 Dec 2023 04:45), Max: 38.6 (22 Dec 2023 10:00)  T(F): 99.3 (23 Dec 2023 04:45), Max: 101.5 (22 Dec 2023 10:00)  HR: 83 (23 Dec 2023 04:45) (74 - 154)  BP: 137/47 (23 Dec 2023 04:00) (100/54 - 137/47)  BP(mean): 74 (23 Dec 2023 04:00) (69 - 74)  RR: 19 (23 Dec 2023 04:45) (18 - 33)  SpO2: 100% (23 Dec 2023 04:45) (100% - 100%)    Parameters below as of 23 Dec 2023 04:00  Patient On (Oxygen Delivery Method): ventilator        ABG - ( 22 Dec 2023 02:43 )  pH, Arterial: 7.380 pH, Blood: x     /  pCO2: 31    /  pO2: 173   / HCO3: 18    / Base Excess: -6.8  /  SaO2: 99.6                I&O's Detail    21 Dec 2023 07:01  -  22 Dec 2023 07:00  --------------------------------------------------------  IN:    Dexmedetomidine: 18 mL    Dexmedetomidine: 72 mL    FentaNYL: 3.7 mL    FentaNYL: 14.7 mL    IV PiggyBack: 250 mL    IV PiggyBack: 100 mL    IV PiggyBack: 200 mL    Lactated Ringers: 825 mL    Lactated Ringers Bolus: 3000 mL    Norepinephrine: 14 mL    Norepinephrine: 625 mL    Pantoprazole: 170 mL    Propofol: 115.9 mL    Vasopressin: 120 mL  Total IN: 5528.3 mL    OUT:    Indwelling Catheter - Urethral (mL): 655 mL    Nasogastric/Oral tube (mL): 250 mL  Total OUT: 905 mL    Total NET: 4623.3 mL      22 Dec 2023 07:01  -  23 Dec 2023 05:31  --------------------------------------------------------  IN:    Albumin 5% - 50 mL: 50 mL    IV PiggyBack: 250 mL    Lactated Ringers: 300 mL    Lactated Ringers: 600 mL    Norepinephrine: 162.6 mL    Phenylephrine: 51.8 mL    Phenylephrine: 177.3 mL    Propofol: 76.1 mL    Vasopressin: 90 mL  Total IN: 1757.8 mL    OUT:    Indwelling Catheter - Urethral (mL): 730 mL    Nasogastric/Oral tube (mL): 100 mL  Total OUT: 830 mL    Total NET: 927.8 mL            LABS:                        6.8    7.57  )-----------( 197      ( 23 Dec 2023 02:50 )             20.1     12-    137  |  104  |  45.9<H>  ----------------------------<  144<H>  4.3   |  19.0<L>  |  1.69<H>    Ca    8.3<L>      23 Dec 2023 02:50  Phos  5.5       Mg     2.4         TPro  5.0<L>  /  Alb  2.2<L>  /  TBili  0.3<L>  /  DBili  x   /  AST  65<H>  /  ALT  43<H>  /  AlkPhos  92            CAPILLARY BLOOD GLUCOSE      POCT Blood Glucose.: 329 mg/dL (21 Dec 2023 07:34)    PT/INR - ( 23 Dec 2023 03:45 )   PT: 15.2 sec;   INR: 1.38 ratio         PTT - ( 23 Dec 2023 03:45 )  PTT:38.8 sec  Urinalysis Basic - ( 23 Dec 2023 02:50 )    Color: x / Appearance: x / SG: x / pH: x  Gluc: 144 mg/dL / Ketone: x  / Bili: x / Urobili: x   Blood: x / Protein: x / Nitrite: x   Leuk Esterase: x / RBC: x / WBC x   Sq Epi: x / Non Sq Epi: x / Bacteria: x      CULTURES:  Culture Results:   No growth ( @ 19:44)  Culture Results:   Moderate Klebsiella aerogenes (Previously Enterobacter)  Normal Respiratory Isabell present ( @ 10:10)  Culture Results:   No growth at 48 Hours ( @ 07:32)  Culture Results:   No growth at 48 Hours ( @ 07:25)      Physical Examination:    General: Intubated and sedated.     HEENT: Pupils sluggish b/l     PULM: Clear to auscultation bilaterally, no significant sputum production    CVS: Irregular/irregular    ABD: Soft, nondistended, nontender, normoactive bowel sounds, no masses    EXT: No edema. Left sided hemiparesis.     SKIN: Warm and well perfused, no rashes noted.    RADIOLOGY: < from: CT Head No Cont (23 @ 03:51) >  IMPRESSION:  Interval decrease in size of the simple density fluid collection, likely   either a pseudomeningocele or seroma, superficial to the right-sided   cranioplasty.  No other significant change.    < from: CT Angio Abdomen and Pelvis w/ IV Cont (23 @ 17:13) >    IMPRESSION:    Diffuse groundglass centrilobular and tree-in-bud nodular opacities which   is suggestive of infectious or aspiration bronchiolitis.    Bilateral lower lobe airspace consolidations may represent pneumonia   and/or atelectasis.    Findings suggestive of endoleak near the proximal stent graft attachment;   evaluation is somewhat limited without noncontrast exam.  Stable size of the aneurysm sac.    Diffuse colonic fecal retention with colonic distention.  No CT evidence for acute thromboembolic mesenteric ischemia.    Findings were discussed by telephone with Dr. Soriano at the time of   interpretation on 2023.    Other findings as discussed above.    < from: TTE W or WO Ultrasound Enhancing Agent (23 @ 19:59) >   1. Limited echo to rule out RV strain.   2. Technically difficult image quality.   3. Left ventricular cavity is normal. Left ventricular systolic function is hyperdynamic with an ejection fraction visually estimated at 70 to 75 %.   4. Normal right ventricular cavity size and systolic function.   5. No pericardial effusion seen.               Patient is a 83y old  Male who presents with a chief complaint of s/p unwitnessed fall with head strike (22 Dec 2023 16:17)      BRIEF HOSPITAL COURSE: 82 y/o M with a h/o HTN, CAD s/p PCO, RCC s/p left nephrectomy, bladder CA, BPH, CHF, Vertigo, HLD, AAA s/p EVAR, parox Afib, h/o HIT, Alzheimer's, initially admitted on 11/10 s/p fall with Rt frontal IPH. Underwent R hemicraniectomy on 11/10 with drain placement. Hospital course complicated by AFib with RVR, Klebsiella PNA, hypoxic respiratory failure, LUE DVT and questionable increase in AAA size. Started on AC with waxing/waning mental status prompting head imaging with evidence of new bleed, so systemic A/C held. TTE showed mobile echodensity on aortic valve and strokes noted within multiple arterial territories, WHIT was recommended but family declined. Pt underwent cranioplasty on , and had subgaleal drain removed 12/10. ABx started for UTI. Pt re-upgraded to NSICU on 12/15 given worsening R frontal IPH, given Andexxa for reversal of NOAC, and subsequently downgraded on . Transferred to MICU on  after developing altered mentation with vomiting and aspiration of dark liquid stomach content. Subsequently developed septic shock state requiring dual IV vasopressor support.    Events last 24 hours: Intermittent episodes of afib RVR (150-160s). Switched to phenylephrine and added digoxin, LR bolus and albumin. Morning labs with hgb 6.8, unit of prbc ordered.     PAST MEDICAL & SURGICAL HISTORY:  HTN  dx       Bladder Cancer (ICD9 188.9)  TCC, dx       Hyperlipemia (ICD9 272.4)  dx       Lt Renal Neoplasm  left - s/p left nephrectomy      Hx antineoplastic chemotherapy (BCG )      Left bundle branch block      Vertigo      Heel spur, left      Abdominal aortic aneurysm (AAA)  5.5 cm      BPH (benign prostatic hyperplasia)      Renal mass, right  current - following with Dr Pamela SHEARER (coronary artery disease)      Bladder cancer, primary, with metastasis from bladder to other site  Left kidney      Acute renal failure, unspecified acute renal failure type      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)      Abdominal aortic aneurysm (AAA) without rupture      Congestive heart failure, unspecified chronicity, unspecified heart failure type      Hypertension      History of abdominal aortic aneurysm (AAA)      CAD (coronary artery disease)      Alzheimers disease      urethral Stent 2008  stent placement and removal      S/P Cystoscopy  bladder polyps removal multiple times (since ), 6/10 , 10/2011 & 10/17/2011, , 2012, last 13, 2013, 2014      S/P Tonsillectomy      History of Lt  Nephrectomy  6/10 - left      History of cystoscopy  2015      H/O abdominal aortic aneurysm repair      S/P AAA repair      History of nephrectomy  Left      Presence of stent in LAD coronary artery      History of heart artery stent  DIONICIO        Allergies    penicillin (Rash)  heparin (Other (Severe))  penicillin (Hives)  Cipro (Other)  Levaquin (Other)  heparin (Other)    Intolerances      FAMILY HISTORY:  Family history of MI (myocardial infarction) (Father, Mother)  father  76y/o MI, mother  94y/o alzheimers    Family history of early CAD (Father, Mother)        Review of Systems: Unable to obtain due to medical condition       Medications:  meropenem Injectable 1000 milliGRAM(s) IV Push every 12 hours  vancomycin  IVPB 1000 milliGRAM(s) IV Intermittent <User Schedule>    digoxin  Injectable 250 MICROGram(s) IV Push once  doxazosin 2 milliGRAM(s) Oral at bedtime  norepinephrine Infusion 0.7 MICROgram(s)/kG/Min IV Continuous <Continuous>  phenylephrine    Infusion 3 MICROgram(s)/kG/Min IV Continuous <Continuous>      acetaminophen     Tablet .. 650 milliGRAM(s) Oral every 6 hours PRN  levETIRAcetam   Injectable 1000 milliGRAM(s) IV Push every 12 hours  memantine 5 milliGRAM(s) Oral two times a day  propofol Infusion 10 MICROgram(s)/kG/Min IV Continuous <Continuous>        pantoprazole  Injectable 40 milliGRAM(s) IV Push every 12 hours  polyethylene glycol 3350 17 Gram(s) Oral daily  senna 2 Tablet(s) Oral at bedtime      atorvastatin 80 milliGRAM(s) Oral at bedtime  hydrocortisone sodium succinate Injectable 100 milliGRAM(s) IV Push every 8 hours  vasopressin Infusion 0.04 Unit(s)/Min IV Continuous <Continuous>    albumin human 25% IVPB 50 milliLiter(s) IV Intermittent every 4 hours  folic acid 1 milliGRAM(s) Oral daily  Nephro-roma 1 Tablet(s) Oral daily      artificial tears (preservative free) Ophthalmic Solution 1 Drop(s) Both EYES every 6 hours PRN  chlorhexidine 2% Cloths 1 Application(s) Topical <User Schedule>        Mode: AC/ CMV (Assist Control/ Continuous Mandatory Ventilation)  RR (machine): 12  TV (machine): 450  FiO2: 40  PEEP: 8  ITime: 1  MAP: 8  PIP: 14      ICU Vital Signs Last 24 Hrs  T(C): 37.4 (23 Dec 2023 04:45), Max: 38.6 (22 Dec 2023 10:00)  T(F): 99.3 (23 Dec 2023 04:45), Max: 101.5 (22 Dec 2023 10:00)  HR: 83 (23 Dec 2023 04:45) (74 - 154)  BP: 137/47 (23 Dec 2023 04:00) (100/54 - 137/47)  BP(mean): 74 (23 Dec 2023 04:00) (69 - 74)  ABP: 159/48 (23 Dec 2023 04:45) (73/36 - 159/48)  ABP(mean): 87 (23 Dec 2023 04:45) (49 - 87)  RR: 19 (23 Dec 2023 04:45) (18 - 33)  SpO2: 100% (23 Dec 2023 04:45) (100% - 100%)    O2 Parameters below as of 23 Dec 2023 04:00  Patient On (Oxygen Delivery Method): ventilator          Vital Signs Last 24 Hrs  T(C): 37.4 (23 Dec 2023 04:45), Max: 38.6 (22 Dec 2023 10:00)  T(F): 99.3 (23 Dec 2023 04:45), Max: 101.5 (22 Dec 2023 10:00)  HR: 83 (23 Dec 2023 04:45) (74 - 154)  BP: 137/47 (23 Dec 2023 04:00) (100/54 - 137/47)  BP(mean): 74 (23 Dec 2023 04:00) (69 - 74)  RR: 19 (23 Dec 2023 04:45) (18 - 33)  SpO2: 100% (23 Dec 2023 04:45) (100% - 100%)    Parameters below as of 23 Dec 2023 04:00  Patient On (Oxygen Delivery Method): ventilator        ABG - ( 22 Dec 2023 02:43 )  pH, Arterial: 7.380 pH, Blood: x     /  pCO2: 31    /  pO2: 173   / HCO3: 18    / Base Excess: -6.8  /  SaO2: 99.6                I&O's Detail    21 Dec 2023 07:01  -  22 Dec 2023 07:00  --------------------------------------------------------  IN:    Dexmedetomidine: 18 mL    Dexmedetomidine: 72 mL    FentaNYL: 3.7 mL    FentaNYL: 14.7 mL    IV PiggyBack: 250 mL    IV PiggyBack: 100 mL    IV PiggyBack: 200 mL    Lactated Ringers: 825 mL    Lactated Ringers Bolus: 3000 mL    Norepinephrine: 14 mL    Norepinephrine: 625 mL    Pantoprazole: 170 mL    Propofol: 115.9 mL    Vasopressin: 120 mL  Total IN: 5528.3 mL    OUT:    Indwelling Catheter - Urethral (mL): 655 mL    Nasogastric/Oral tube (mL): 250 mL  Total OUT: 905 mL    Total NET: 4623.3 mL      22 Dec 2023 07:01  -  23 Dec 2023 05:31  --------------------------------------------------------  IN:    Albumin 5% - 50 mL: 50 mL    IV PiggyBack: 250 mL    Lactated Ringers: 300 mL    Lactated Ringers: 600 mL    Norepinephrine: 162.6 mL    Phenylephrine: 51.8 mL    Phenylephrine: 177.3 mL    Propofol: 76.1 mL    Vasopressin: 90 mL  Total IN: 1757.8 mL    OUT:    Indwelling Catheter - Urethral (mL): 730 mL    Nasogastric/Oral tube (mL): 100 mL  Total OUT: 830 mL    Total NET: 927.8 mL            LABS:                        6.8    7.57  )-----------( 197      ( 23 Dec 2023 02:50 )             20.1     12-    137  |  104  |  45.9<H>  ----------------------------<  144<H>  4.3   |  19.0<L>  |  1.69<H>    Ca    8.3<L>      23 Dec 2023 02:50  Phos  5.5       Mg     2.4         TPro  5.0<L>  /  Alb  2.2<L>  /  TBili  0.3<L>  /  DBili  x   /  AST  65<H>  /  ALT  43<H>  /  AlkPhos  92            CAPILLARY BLOOD GLUCOSE      POCT Blood Glucose.: 329 mg/dL (21 Dec 2023 07:34)    PT/INR - ( 23 Dec 2023 03:45 )   PT: 15.2 sec;   INR: 1.38 ratio         PTT - ( 23 Dec 2023 03:45 )  PTT:38.8 sec  Urinalysis Basic - ( 23 Dec 2023 02:50 )    Color: x / Appearance: x / SG: x / pH: x  Gluc: 144 mg/dL / Ketone: x  / Bili: x / Urobili: x   Blood: x / Protein: x / Nitrite: x   Leuk Esterase: x / RBC: x / WBC x   Sq Epi: x / Non Sq Epi: x / Bacteria: x      CULTURES:  Culture Results:   No growth ( @ 19:44)  Culture Results:   Moderate Klebsiella aerogenes (Previously Enterobacter)  Normal Respiratory Isabell present ( @ 10:10)  Culture Results:   No growth at 48 Hours ( @ 07:32)  Culture Results:   No growth at 48 Hours ( @ 07:25)      Physical Examination:    General: Intubated and sedated.     HEENT: Pupils sluggish b/l     PULM: Clear to auscultation bilaterally, no significant sputum production    CVS: Irregular/irregular    ABD: Soft, nondistended, nontender, normoactive bowel sounds, no masses    EXT: No edema. Left sided hemiparesis.     SKIN: Warm and well perfused, no rashes noted.    RADIOLOGY: < from: CT Head No Cont (23 @ 03:51) >  IMPRESSION:  Interval decrease in size of the simple density fluid collection, likely   either a pseudomeningocele or seroma, superficial to the right-sided   cranioplasty.  No other significant change.    < from: CT Angio Abdomen and Pelvis w/ IV Cont (23 @ 17:13) >    IMPRESSION:    Diffuse groundglass centrilobular and tree-in-bud nodular opacities which   is suggestive of infectious or aspiration bronchiolitis.    Bilateral lower lobe airspace consolidations may represent pneumonia   and/or atelectasis.    Findings suggestive of endoleak near the proximal stent graft attachment;   evaluation is somewhat limited without noncontrast exam.  Stable size of the aneurysm sac.    Diffuse colonic fecal retention with colonic distention.  No CT evidence for acute thromboembolic mesenteric ischemia.    Findings were discussed by telephone with Dr. Soriano at the time of   interpretation on 2023.    Other findings as discussed above.    < from: TTE W or WO Ultrasound Enhancing Agent (23 @ 19:59) >   1. Limited echo to rule out RV strain.   2. Technically difficult image quality.   3. Left ventricular cavity is normal. Left ventricular systolic function is hyperdynamic with an ejection fraction visually estimated at 70 to 75 %.   4. Normal right ventricular cavity size and systolic function.   5. No pericardial effusion seen.

## 2023-12-23 NOTE — PROGRESS NOTE ADULT - ASSESSMENT
83 year old male with a history of CAD s/p PCI, CHF, AA here with a fall now with septic shock due to PNA/UTI, and ICH and concern for UGIB    Coffee Ground Emesis  Anemia  Given his presentation, DDX includes stress ulcer/gastritis, AVM  He remains critically ill without further overt bleeding and at this time endoscopy risks may outweigh any potential benefits. Continue BID PPI  Avoid nsaids   Transfuse to maintain HGB >8

## 2023-12-23 NOTE — PROGRESS NOTE ADULT - ASSESSMENT
84 y/o M with a h/o HTN, CAD s/p PCO, RCC s/p left nephrectomy, bladder CA, BPH, CHF, Vertigo, HLD, AAA s/p EVAR, parox Afib, h/o HIT, Alzheimer's admitted to MICU with:      1. AMS   2. Rt frontal IPH   3. UTI/Aspiration PNA (previously grew klebsiella)  4. Afib RVR   5. Shock   6. Aortic valve echodensity   7. Anemia     Neuro: On keppra 1000 BID. EEG findings likely related to right front ICH, no seizure activity noted. Weaning propofol to conduct SAT/SBT. Neuro checks q2h. Neurosurgery following.   Cardio: Remains in dual pressor septic shock state to maintain MAP>65. Afib RVR on monitor. Switched from levophed to phenylephrine. Will keep vasopressin. Ordered 500cc bolus LR with albumin and digoxin (renally dosed). Would avoid bb/ccb given soft hemodynamics and amiodarone given risk of stroke with chemical cardioversion in patient not on AC. TTE with aortic valve echodensity, family refusing WHIT.   Resp: Titrating vent settings in accordance to abgs. Will performed weaning trial as condition warrants.   GI: CTA without evidence of bowel ischemia. No further reports of coffee ground emesis. Having bowel movements s/p enema. GI was consulted and deferred endoscopy at this time. Will continue with PPI BID and bowel regimen as ordered. Consider adding TF if he fails CPAP trial.   : DONNIE with slow interval improvement. Fluids and vasopressors for additional support. Monitor UOP, bun/creatine and lytes.   ID: Broadspectrum abx with vancomycin and meropenem. Sputum previously + klebsiella. Repeat BC sent. LP appears noninfectious, pending official cultures.   Endo: Stress dose steroids. Maintain euglycemia.   Heme: Hgb 6.8, unit of prbc ordered. Anemia possibly dilutional given recent IVFs. Holding full AC at this time. Will continue to trend h/h.     CRITICAL CARE TIME SPENT: 40 minutes   Time spent evaluating/treating patient with medical issues that pose a high risk for life threatening deterioration, and/or end-organ damage, reviewing data/labs/imaging, discussing case with multidisciplinary team, discussing plan/goals of care with patient/family. Non-inclusive of procedure time. Date of entry of this note is equal to the date of services rendered.     Case Discussed with ICU Attending, Dr. Greenwood     84 y/o M with a h/o HTN, CAD s/p PCO, RCC s/p left nephrectomy, bladder CA, BPH, CHF, Vertigo, HLD, AAA s/p EVAR, parox Afib, h/o HIT, Alzheimer's admitted to MICU with:      1. AMS   2. Rt frontal IPH   3. UTI/Aspiration PNA (previously grew klebsiella)  4. Afib RVR   5. Shock   6. Aortic valve echodensity   7. Anemia     Neuro: On keppra 1000 BID. EEG findings likely related to right front ICH, no seizure activity noted. Weaning propofol to conduct SAT/SBT. Neuro checks q2h. Neurosurgery following.   Cardio: Remains in dual pressor septic shock state to maintain MAP>65. Afib RVR on monitor. Switched from levophed to phenylephrine. Will keep vasopressin. Ordered 500cc bolus LR with albumin and digoxin (renally dosed). Would avoid bb/ccb given soft hemodynamics and amiodarone given risk of stroke with chemical cardioversion in patient not on AC. TTE with aortic valve echodensity, family refusing WHIT.   Resp: Titrating vent settings in accordance to abgs. Will performed weaning trial as condition warrants.   GI: CTA without evidence of bowel ischemia. No further reports of coffee ground emesis. Having bowel movements s/p enema. GI was consulted and deferred endoscopy at this time. Will continue with PPI BID and bowel regimen as ordered. Consider adding TF if he fails CPAP trial.   : DONNIE with slow interval improvement. Fluids and vasopressors for additional support. Monitor UOP, bun/creatine and lytes.   ID: Broadspectrum abx with vancomycin and meropenem. Sputum previously + klebsiella. Repeat BC sent. LP appears noninfectious, pending official cultures.   Endo: Stress dose steroids. Maintain euglycemia.   Heme: Hgb 6.8, unit of prbc ordered. Anemia possibly dilutional given recent IVFs. Will continue to trend h/h. Not at candidate for full AC.     CRITICAL CARE TIME SPENT: 40 minutes   Time spent evaluating/treating patient with medical issues that pose a high risk for life threatening deterioration, and/or end-organ damage, reviewing data/labs/imaging, discussing case with multidisciplinary team, discussing plan/goals of care with patient/family. Non-inclusive of procedure time. Date of entry of this note is equal to the date of services rendered.     Case Discussed with ICU Attending, Dr. Soares      82 y/o M with a h/o HTN, CAD s/p PCO, RCC s/p left nephrectomy, bladder CA, BPH, CHF, Vertigo, HLD, AAA s/p EVAR, parox Afib, h/o HIT, Alzheimer's admitted to MICU with:      1. AMS   2. Rt frontal IPH   3. UTI/Aspiration PNA (previously grew klebsiella)  4. Afib RVR   5. Shock   6. Aortic valve echodensity   7. Anemia     Neuro: On keppra 1000 BID. EEG findings likely related to right front ICH, no seizure activity noted. Weaning propofol to conduct SAT/SBT. Neuro checks q2h. Neurosurgery following.   Cardio: Remains in dual pressor septic shock state to maintain MAP>65. Afib RVR on monitor. Switched from levophed to phenylephrine. Will keep vasopressin. Ordered 500cc bolus LR with albumin and digoxin (renally dosed). Would avoid bb/ccb given soft hemodynamics and amiodarone given risk of stroke with chemical cardioversion in patient not on AC. TTE with aortic valve echodensity, family refusing WHIT.   Resp: Titrating vent settings in accordance to abgs. Will performed weaning trial as condition warrants.   GI: CTA without evidence of bowel ischemia. No further reports of coffee ground emesis. Having bowel movements s/p enema. GI was consulted and deferred endoscopy at this time. Will continue with PPI BID and bowel regimen as ordered. Consider adding TF if he fails CPAP trial.   : DONNIE with slow interval improvement. Fluids and vasopressors for additional support. Monitor UOP, bun/creatine and lytes.   ID: Broadspectrum abx with vancomycin and meropenem. Sputum previously + klebsiella. Repeat BC sent. LP appears noninfectious, pending official cultures.   Endo: Stress dose steroids. Maintain euglycemia.   Heme: Hgb 6.8, unit of prbc ordered. Anemia possibly dilutional given recent IVFs. Will continue to trend h/h. Not at candidate for full AC.     CRITICAL CARE TIME SPENT: 40 minutes   Time spent evaluating/treating patient with medical issues that pose a high risk for life threatening deterioration, and/or end-organ damage, reviewing data/labs/imaging, discussing case with multidisciplinary team, discussing plan/goals of care with patient/family. Non-inclusive of procedure time. Date of entry of this note is equal to the date of services rendered.     Case Discussed with ICU Attending, Dr. Soares      84 y/o M with a h/o HTN, CAD s/p PCO, RCC s/p left nephrectomy, bladder CA, BPH, CHF, Vertigo, HLD, AAA s/p EVAR, parox Afib, h/o HIT, Alzheimer's admitted to MICU with:      1. AMS   2. Rt frontal IPH   3. UTI/Aspiration PNA (previously grew klebsiella)  4. Afib RVR   5. Shock   6. Aortic valve echodensity   7. Anemia     Neuro: On keppra 1000 BID. EEG findings likely related to right front ICH, no seizure activity noted. Weaning propofol to conduct SAT/SBT. Neuro checks q2h.   Cardio: Remains in dual pressor septic shock state to maintain MAP>65. Afib RVR on monitor. Switched from levophed to phenylephrine. Will keep vasopressin. Ordered 500cc bolus LR with albumin and digoxin (renally dosed) for rate control. Would avoid bb/ccb given soft hemodynamics and amiodarone given risk of stroke with chemical cardioversion in patient not on AC. TTE with aortic valve echodensity, family refusing WHIT.   Resp: Titrating vent settings in accordance to abgs. Will performed weaning trial as condition warrants. Maintain spo2>90%.   GI: CTA without evidence of bowel ischemia. No further reports of coffee ground emesis. Having bowel movements s/p enema.  Will continue with PPI BID and bowel regimen as ordered. Consider adding TF if he fails CPAP trial.   : DONNIE with slow interval improvement. Fluids and vasopressors for additional support. Monitor UOP, bun/creatine and lytes.   ID: Broadspectrum abx with vancomycin and meropenem. Sputum previously + klebsiella. Repeat BC sent. LP appears noninfectious, pending official cultures.   Endo: Stress dose steroids. Maintain euglycemia.   Heme: Hgb 6.8, unit of prbc ordered. Anemia possibly dilutional given recent IVFs. No signs of active bleeding and pressor dose decreasing. Will continue to trend h/h. Not at candidate for full AC.     CRITICAL CARE TIME SPENT: 40 minutes   Time spent evaluating/treating patient with medical issues that pose a high risk for life threatening deterioration, and/or end-organ damage, reviewing data/labs/imaging, discussing case with multidisciplinary team, discussing plan/goals of care with patient/family. Non-inclusive of procedure time. Date of entry of this note is equal to the date of services rendered.     Case Discussed with ICU Attending, Dr. Soares      82 y/o M with a h/o HTN, CAD s/p PCO, RCC s/p left nephrectomy, bladder CA, BPH, CHF, Vertigo, HLD, AAA s/p EVAR, parox Afib, h/o HIT, Alzheimer's admitted to MICU with:      1. AMS   2. Rt frontal IPH   3. UTI/Aspiration PNA (previously grew klebsiella)  4. Afib RVR   5. Shock   6. Aortic valve echodensity   7. Anemia     Neuro: On keppra 1000 BID. EEG findings likely related to right front ICH, no seizure activity noted. Weaning propofol to conduct SAT/SBT. Neuro checks q2h.   Cardio: Remains in dual pressor septic shock state to maintain MAP>65. Afib RVR on monitor. Switched from levophed to phenylephrine. Will keep vasopressin. Ordered 500cc bolus LR with albumin and digoxin (renally dosed) for rate control. Would avoid bb/ccb given soft hemodynamics and amiodarone given risk of stroke with chemical cardioversion in patient not on AC. TTE with aortic valve echodensity, family refusing WHIT.   Resp: Titrating vent settings in accordance to abgs. Will performed weaning trial as condition warrants. Maintain spo2>90%.   GI: CTA without evidence of bowel ischemia. No further reports of coffee ground emesis. Having bowel movements s/p enema.  Will continue with PPI BID and bowel regimen as ordered. Consider adding TF if he fails CPAP trial.   : DONNIE with slow interval improvement. Fluids and vasopressors for additional support. Monitor UOP, bun/creatine and lytes.   ID: Broadspectrum abx with vancomycin and meropenem. Sputum previously + klebsiella. Repeat BC sent. LP appears noninfectious, pending official cultures.   Endo: Stress dose steroids. Maintain euglycemia.   Heme: Hgb 6.8, unit of prbc ordered. Anemia possibly dilutional given recent IVFs. No signs of active bleeding and pressor dose decreasing. Will continue to trend h/h. Not at candidate for full AC.     CRITICAL CARE TIME SPENT: 40 minutes   Time spent evaluating/treating patient with medical issues that pose a high risk for life threatening deterioration, and/or end-organ damage, reviewing data/labs/imaging, discussing case with multidisciplinary team, discussing plan/goals of care with patient/family. Non-inclusive of procedure time. Date of entry of this note is equal to the date of services rendered.     Case Discussed with ICU Attending, Dr. Soares      84 y/o M with a h/o HTN, CAD s/p PCO, RCC s/p left nephrectomy, bladder CA, BPH, CHF, Vertigo, HLD, AAA s/p EVAR, parox Afib, h/o HIT, Alzheimer's admitted to MICU with:      1. AMS   2. Rt frontal IPH   3. UTI/Aspiration PNA (previously grew klebsiella)  4. Afib RVR   5. Shock   6. Aortic valve echodensity   7. Anemia     Neuro: On keppra 1000 BID. EEG findings likely related to right front ICH, no seizure activity noted. Weaning propofol to conduct SAT/SBT. Neuro checks q2h.   Cardio: Remains in dual pressor septic shock state to maintain MAP>65. Afib RVR on monitor. Switched from levophed to phenylephrine. Will keep vasopressin. Ordered 500cc bolus LR with albumin and digoxin (renally dosed) for rate control. Would avoid bb/ccb given soft hemodynamics and amiodarone given risk of stroke with chemical cardioversion in patient not on AC. TTE with aortic valve echodensity, family refusing WHIT.   Resp: Titrating vent settings in accordance to abgs. Will performed weaning trial as condition warrants. Maintain spo2>90%.   GI: CTA without evidence of bowel ischemia. No further reports of coffee ground emesis. Having bowel movements s/p enema.  Will continue with PPI BID and bowel regimen as ordered. Consider adding TF if he fails CPAP trial.   : DONNIE with small interval improvement. Fluids and vasopressors for additional support. Monitor UOP, bun/creatine and lytes.   ID: Broadspectrum abx with vancomycin and meropenem. Sputum previously + klebsiella. Repeat BC sent. LP appears noninfectious, pending official cultures.   Endo: Stress dose steroids. Maintain euglycemia.   Heme: Hgb 6.8, unit of prbc ordered. Anemia possibly dilutional given recent IVFs. No signs of active bleeding and pressor dose decreasing. Will continue to trend h/h. Not at candidate for full AC.     CRITICAL CARE TIME SPENT: 40 minutes   Time spent evaluating/treating patient with medical issues that pose a high risk for life threatening deterioration, and/or end-organ damage, reviewing data/labs/imaging, discussing case with multidisciplinary team, discussing plan/goals of care with patient/family. Non-inclusive of procedure time. Date of entry of this note is equal to the date of services rendered.     Case Discussed with ICU Attending, Dr. Soares

## 2023-12-24 LAB
ALBUMIN SERPL ELPH-MCNC: 2.4 G/DL — LOW (ref 3.3–5.2)
ALBUMIN SERPL ELPH-MCNC: 2.4 G/DL — LOW (ref 3.3–5.2)
ALP SERPL-CCNC: 85 U/L — SIGNIFICANT CHANGE UP (ref 40–120)
ALP SERPL-CCNC: 85 U/L — SIGNIFICANT CHANGE UP (ref 40–120)
ALT FLD-CCNC: 45 U/L — HIGH
ALT FLD-CCNC: 45 U/L — HIGH
ANION GAP SERPL CALC-SCNC: 13 MMOL/L — SIGNIFICANT CHANGE UP (ref 5–17)
ANION GAP SERPL CALC-SCNC: 13 MMOL/L — SIGNIFICANT CHANGE UP (ref 5–17)
APTT BLD: 32.8 SEC — SIGNIFICANT CHANGE UP (ref 24.5–35.6)
APTT BLD: 32.8 SEC — SIGNIFICANT CHANGE UP (ref 24.5–35.6)
AST SERPL-CCNC: 56 U/L — HIGH
AST SERPL-CCNC: 56 U/L — HIGH
BASOPHILS # BLD AUTO: 0.02 K/UL — SIGNIFICANT CHANGE UP (ref 0–0.2)
BASOPHILS # BLD AUTO: 0.02 K/UL — SIGNIFICANT CHANGE UP (ref 0–0.2)
BASOPHILS NFR BLD AUTO: 0.3 % — SIGNIFICANT CHANGE UP (ref 0–2)
BASOPHILS NFR BLD AUTO: 0.3 % — SIGNIFICANT CHANGE UP (ref 0–2)
BILIRUB SERPL-MCNC: 0.4 MG/DL — SIGNIFICANT CHANGE UP (ref 0.4–2)
BILIRUB SERPL-MCNC: 0.4 MG/DL — SIGNIFICANT CHANGE UP (ref 0.4–2)
BUN SERPL-MCNC: 43.6 MG/DL — HIGH (ref 8–20)
BUN SERPL-MCNC: 43.6 MG/DL — HIGH (ref 8–20)
CALCIUM SERPL-MCNC: 8.3 MG/DL — LOW (ref 8.4–10.5)
CALCIUM SERPL-MCNC: 8.3 MG/DL — LOW (ref 8.4–10.5)
CHLORIDE SERPL-SCNC: 102 MMOL/L — SIGNIFICANT CHANGE UP (ref 96–108)
CHLORIDE SERPL-SCNC: 102 MMOL/L — SIGNIFICANT CHANGE UP (ref 96–108)
CO2 SERPL-SCNC: 20 MMOL/L — LOW (ref 22–29)
CO2 SERPL-SCNC: 20 MMOL/L — LOW (ref 22–29)
CREAT SERPL-MCNC: 1.22 MG/DL — SIGNIFICANT CHANGE UP (ref 0.5–1.3)
CREAT SERPL-MCNC: 1.22 MG/DL — SIGNIFICANT CHANGE UP (ref 0.5–1.3)
EGFR: 59 ML/MIN/1.73M2 — LOW
EGFR: 59 ML/MIN/1.73M2 — LOW
EOSINOPHIL # BLD AUTO: 0 K/UL — SIGNIFICANT CHANGE UP (ref 0–0.5)
EOSINOPHIL # BLD AUTO: 0 K/UL — SIGNIFICANT CHANGE UP (ref 0–0.5)
EOSINOPHIL NFR BLD AUTO: 0 % — SIGNIFICANT CHANGE UP (ref 0–6)
EOSINOPHIL NFR BLD AUTO: 0 % — SIGNIFICANT CHANGE UP (ref 0–6)
GLUCOSE SERPL-MCNC: 128 MG/DL — HIGH (ref 70–99)
GLUCOSE SERPL-MCNC: 128 MG/DL — HIGH (ref 70–99)
HCT VFR BLD CALC: 22.1 % — LOW (ref 39–50)
HCT VFR BLD CALC: 22.1 % — LOW (ref 39–50)
HCT VFR BLD CALC: 22.7 % — LOW (ref 39–50)
HCT VFR BLD CALC: 22.7 % — LOW (ref 39–50)
HGB BLD-MCNC: 7.4 G/DL — LOW (ref 13–17)
HGB BLD-MCNC: 7.4 G/DL — LOW (ref 13–17)
HGB BLD-MCNC: 7.8 G/DL — LOW (ref 13–17)
HGB BLD-MCNC: 7.8 G/DL — LOW (ref 13–17)
IMM GRANULOCYTES NFR BLD AUTO: 0.1 % — SIGNIFICANT CHANGE UP (ref 0–0.9)
IMM GRANULOCYTES NFR BLD AUTO: 0.1 % — SIGNIFICANT CHANGE UP (ref 0–0.9)
INR BLD: 1.18 RATIO — SIGNIFICANT CHANGE UP (ref 0.85–1.18)
INR BLD: 1.18 RATIO — SIGNIFICANT CHANGE UP (ref 0.85–1.18)
LYMPHOCYTES # BLD AUTO: 0.41 K/UL — LOW (ref 1–3.3)
LYMPHOCYTES # BLD AUTO: 0.41 K/UL — LOW (ref 1–3.3)
LYMPHOCYTES # BLD AUTO: 5.6 % — LOW (ref 13–44)
LYMPHOCYTES # BLD AUTO: 5.6 % — LOW (ref 13–44)
MAGNESIUM SERPL-MCNC: 2.1 MG/DL — SIGNIFICANT CHANGE UP (ref 1.6–2.6)
MAGNESIUM SERPL-MCNC: 2.1 MG/DL — SIGNIFICANT CHANGE UP (ref 1.6–2.6)
MCHC RBC-ENTMCNC: 30.7 PG — SIGNIFICANT CHANGE UP (ref 27–34)
MCHC RBC-ENTMCNC: 30.7 PG — SIGNIFICANT CHANGE UP (ref 27–34)
MCHC RBC-ENTMCNC: 31 PG — SIGNIFICANT CHANGE UP (ref 27–34)
MCHC RBC-ENTMCNC: 31 PG — SIGNIFICANT CHANGE UP (ref 27–34)
MCHC RBC-ENTMCNC: 33.5 GM/DL — SIGNIFICANT CHANGE UP (ref 32–36)
MCHC RBC-ENTMCNC: 33.5 GM/DL — SIGNIFICANT CHANGE UP (ref 32–36)
MCHC RBC-ENTMCNC: 34.4 GM/DL — SIGNIFICANT CHANGE UP (ref 32–36)
MCHC RBC-ENTMCNC: 34.4 GM/DL — SIGNIFICANT CHANGE UP (ref 32–36)
MCV RBC AUTO: 90.1 FL — SIGNIFICANT CHANGE UP (ref 80–100)
MCV RBC AUTO: 90.1 FL — SIGNIFICANT CHANGE UP (ref 80–100)
MCV RBC AUTO: 91.7 FL — SIGNIFICANT CHANGE UP (ref 80–100)
MCV RBC AUTO: 91.7 FL — SIGNIFICANT CHANGE UP (ref 80–100)
MONOCYTES # BLD AUTO: 0.42 K/UL — SIGNIFICANT CHANGE UP (ref 0–0.9)
MONOCYTES # BLD AUTO: 0.42 K/UL — SIGNIFICANT CHANGE UP (ref 0–0.9)
MONOCYTES NFR BLD AUTO: 5.8 % — SIGNIFICANT CHANGE UP (ref 2–14)
MONOCYTES NFR BLD AUTO: 5.8 % — SIGNIFICANT CHANGE UP (ref 2–14)
NEUTROPHILS # BLD AUTO: 6.44 K/UL — SIGNIFICANT CHANGE UP (ref 1.8–7.4)
NEUTROPHILS # BLD AUTO: 6.44 K/UL — SIGNIFICANT CHANGE UP (ref 1.8–7.4)
NEUTROPHILS NFR BLD AUTO: 88.2 % — HIGH (ref 43–77)
NEUTROPHILS NFR BLD AUTO: 88.2 % — HIGH (ref 43–77)
PHOSPHATE SERPL-MCNC: 3.8 MG/DL — SIGNIFICANT CHANGE UP (ref 2.4–4.7)
PHOSPHATE SERPL-MCNC: 3.8 MG/DL — SIGNIFICANT CHANGE UP (ref 2.4–4.7)
PLATELET # BLD AUTO: 125 K/UL — LOW (ref 150–400)
PLATELET # BLD AUTO: 125 K/UL — LOW (ref 150–400)
PLATELET # BLD AUTO: 127 K/UL — LOW (ref 150–400)
PLATELET # BLD AUTO: 127 K/UL — LOW (ref 150–400)
POTASSIUM SERPL-MCNC: 2.9 MMOL/L — CRITICAL LOW (ref 3.5–5.3)
POTASSIUM SERPL-MCNC: 2.9 MMOL/L — CRITICAL LOW (ref 3.5–5.3)
POTASSIUM SERPL-SCNC: 2.9 MMOL/L — CRITICAL LOW (ref 3.5–5.3)
POTASSIUM SERPL-SCNC: 2.9 MMOL/L — CRITICAL LOW (ref 3.5–5.3)
PROT SERPL-MCNC: 5.1 G/DL — LOW (ref 6.6–8.7)
PROT SERPL-MCNC: 5.1 G/DL — LOW (ref 6.6–8.7)
PROTHROM AB SERPL-ACNC: 13 SEC — SIGNIFICANT CHANGE UP (ref 9.5–13)
PROTHROM AB SERPL-ACNC: 13 SEC — SIGNIFICANT CHANGE UP (ref 9.5–13)
RBC # BLD: 2.41 M/UL — LOW (ref 4.2–5.8)
RBC # BLD: 2.41 M/UL — LOW (ref 4.2–5.8)
RBC # BLD: 2.52 M/UL — LOW (ref 4.2–5.8)
RBC # BLD: 2.52 M/UL — LOW (ref 4.2–5.8)
RBC # FLD: 16.1 % — HIGH (ref 10.3–14.5)
SODIUM SERPL-SCNC: 135 MMOL/L — SIGNIFICANT CHANGE UP (ref 135–145)
SODIUM SERPL-SCNC: 135 MMOL/L — SIGNIFICANT CHANGE UP (ref 135–145)
VANCOMYCIN TROUGH SERPL-MCNC: 20.4 UG/ML — HIGH (ref 10–20)
VANCOMYCIN TROUGH SERPL-MCNC: 20.4 UG/ML — HIGH (ref 10–20)
WBC # BLD: 6.92 K/UL — SIGNIFICANT CHANGE UP (ref 3.8–10.5)
WBC # BLD: 6.92 K/UL — SIGNIFICANT CHANGE UP (ref 3.8–10.5)
WBC # BLD: 7.3 K/UL — SIGNIFICANT CHANGE UP (ref 3.8–10.5)
WBC # BLD: 7.3 K/UL — SIGNIFICANT CHANGE UP (ref 3.8–10.5)
WBC # FLD AUTO: 6.92 K/UL — SIGNIFICANT CHANGE UP (ref 3.8–10.5)
WBC # FLD AUTO: 6.92 K/UL — SIGNIFICANT CHANGE UP (ref 3.8–10.5)
WBC # FLD AUTO: 7.3 K/UL — SIGNIFICANT CHANGE UP (ref 3.8–10.5)
WBC # FLD AUTO: 7.3 K/UL — SIGNIFICANT CHANGE UP (ref 3.8–10.5)

## 2023-12-24 PROCEDURE — 95718 EEG PHYS/QHP 2-12 HR W/VEEG: CPT

## 2023-12-24 PROCEDURE — 99232 SBSQ HOSP IP/OBS MODERATE 35: CPT

## 2023-12-24 RX ORDER — MIDODRINE HYDROCHLORIDE 2.5 MG/1
5 TABLET ORAL EVERY 8 HOURS
Refills: 0 | Status: DISCONTINUED | OUTPATIENT
Start: 2023-12-24 | End: 2023-12-25

## 2023-12-24 RX ORDER — ACETAMINOPHEN 500 MG
1000 TABLET ORAL ONCE
Refills: 0 | Status: DISCONTINUED | OUTPATIENT
Start: 2023-12-24 | End: 2023-12-24

## 2023-12-24 RX ORDER — DIGOXIN 250 MCG
125 TABLET ORAL DAILY
Refills: 0 | Status: DISCONTINUED | OUTPATIENT
Start: 2023-12-25 | End: 2023-12-25

## 2023-12-24 RX ORDER — DIGOXIN 250 MCG
500 TABLET ORAL ONCE
Refills: 0 | Status: COMPLETED | OUTPATIENT
Start: 2023-12-24 | End: 2023-12-24

## 2023-12-24 RX ORDER — POTASSIUM CHLORIDE 20 MEQ
20 PACKET (EA) ORAL
Refills: 0 | Status: COMPLETED | OUTPATIENT
Start: 2023-12-24 | End: 2023-12-24

## 2023-12-24 RX ORDER — POTASSIUM CHLORIDE 20 MEQ
40 PACKET (EA) ORAL ONCE
Refills: 0 | Status: COMPLETED | OUTPATIENT
Start: 2023-12-24 | End: 2023-12-24

## 2023-12-24 RX ORDER — PANTOPRAZOLE SODIUM 20 MG/1
40 TABLET, DELAYED RELEASE ORAL
Refills: 0 | Status: DISCONTINUED | OUTPATIENT
Start: 2023-12-24 | End: 2024-01-12

## 2023-12-24 RX ORDER — MIDODRINE HYDROCHLORIDE 2.5 MG/1
5 TABLET ORAL EVERY 8 HOURS
Refills: 0 | Status: DISCONTINUED | OUTPATIENT
Start: 2023-12-24 | End: 2023-12-24

## 2023-12-24 RX ADMIN — VASOPRESSIN 6 UNIT(S)/MIN: 20 INJECTION INTRAVENOUS at 04:24

## 2023-12-24 RX ADMIN — Medication 500 MICROGRAM(S): at 08:49

## 2023-12-24 RX ADMIN — SENNA PLUS 2 TABLET(S): 8.6 TABLET ORAL at 21:57

## 2023-12-24 RX ADMIN — MIDODRINE HYDROCHLORIDE 5 MILLIGRAM(S): 2.5 TABLET ORAL at 21:57

## 2023-12-24 RX ADMIN — PANTOPRAZOLE SODIUM 40 MILLIGRAM(S): 20 TABLET, DELAYED RELEASE ORAL at 17:54

## 2023-12-24 RX ADMIN — MEROPENEM 1000 MILLIGRAM(S): 1 INJECTION INTRAVENOUS at 14:01

## 2023-12-24 RX ADMIN — ATORVASTATIN CALCIUM 80 MILLIGRAM(S): 80 TABLET, FILM COATED ORAL at 21:57

## 2023-12-24 RX ADMIN — Medication 50 MILLIEQUIVALENT(S): at 08:49

## 2023-12-24 RX ADMIN — CHLORHEXIDINE GLUCONATE 1 APPLICATION(S): 213 SOLUTION TOPICAL at 06:25

## 2023-12-24 RX ADMIN — SODIUM CHLORIDE 75 MILLILITER(S): 9 INJECTION, SOLUTION INTRAVENOUS at 06:24

## 2023-12-24 RX ADMIN — LEVETIRACETAM 1000 MILLIGRAM(S): 250 TABLET, FILM COATED ORAL at 06:24

## 2023-12-24 RX ADMIN — Medication 1 MILLIGRAM(S): at 11:16

## 2023-12-24 RX ADMIN — Medication 50 MILLIEQUIVALENT(S): at 06:26

## 2023-12-24 RX ADMIN — Medication 50 MILLIGRAM(S): at 14:01

## 2023-12-24 RX ADMIN — Medication 40 MILLIEQUIVALENT(S): at 06:26

## 2023-12-24 RX ADMIN — MEROPENEM 1000 MILLIGRAM(S): 1 INJECTION INTRAVENOUS at 03:35

## 2023-12-24 RX ADMIN — Medication 1 TABLET(S): at 11:16

## 2023-12-24 RX ADMIN — Medication 50 MILLIEQUIVALENT(S): at 10:01

## 2023-12-24 RX ADMIN — Medication 50 MILLIGRAM(S): at 21:56

## 2023-12-24 RX ADMIN — Medication 50 MILLIGRAM(S): at 06:25

## 2023-12-24 RX ADMIN — MEMANTINE HYDROCHLORIDE 5 MILLIGRAM(S): 10 TABLET ORAL at 17:53

## 2023-12-24 RX ADMIN — Medication 2 MILLIGRAM(S): at 21:57

## 2023-12-24 RX ADMIN — PHENYLEPHRINE HYDROCHLORIDE 40.9 MICROGRAM(S)/KG/MIN: 10 INJECTION INTRAVENOUS at 05:24

## 2023-12-24 RX ADMIN — SODIUM CHLORIDE 75 MILLILITER(S): 9 INJECTION, SOLUTION INTRAVENOUS at 17:54

## 2023-12-24 RX ADMIN — LEVETIRACETAM 1000 MILLIGRAM(S): 250 TABLET, FILM COATED ORAL at 17:54

## 2023-12-24 NOTE — PROGRESS NOTE ADULT - ASSESSMENT
84 y/o M with a h/o HTN, CAD s/p PCO, RCC s/p left nephrectomy, bladder CA, BPH, CHF, Vertigo, HLD, AAA s/p EVAR, parox Afib, h/o HIT, Alzheimer's admitted to MICU with:      Rt frontal IPH   UTI/Aspiration PNA (sputum cx klebsiella)  Shock, septic  Afib RVR   Aortic valve echodensity   ABLA / Coffee Ground Emesis  Hypokalemia    Plan:  NEURO: Weaning propofol with hope to perform SAT/SBT today. EEG R anterior slowing and sharp wave discharges, likely relared to R stroke/blood, no seizures noted. Continue prophylactic Keppra 1000mg BID. q2h neurochecks per NSGY.  CARDIAC: Actively titrating phenylephrine to maintain goal MAP>65. Fixed dose vasopressin, stress dose steroids. Remains in AFib RVR, started 500mcg digoxin with 125mcg IV daily for rate control. TTE this admission with aortic valve echodensity, family refusing WHIT.   RESPIRATORY: Remains on Full vent support. Titrate settings to maintain SaO2 >90%, or pH >7.25. SAT/SBT today.  GI: Trickle feeds. Protonix gtt transitioned to IV BID.  : DONNIE improved, trend Cr. Monitor I&Os. Actively repleting for goal K>4, Mg>2, P>3.   ENDO: No active issues. Goal -180.  ID: Febrile yesterday, WBC wnl. Continue empiric meropenem, klebsiella in most recent sputum cx. Discontinued vanco, MRSA PCR negative, CSF PCR NGTD. Most recent BCx NGTD.  HEME: S/p 2U PRBC yesterday. H/H remains stable today. Transfuse for goal Hgb>8. No ASA or AC given recent Hgb drop yesterday. SCDs only.    Dispo: Continue ICU level care    Case discussed with ICU Attending Dr Rizvi.

## 2023-12-24 NOTE — PROGRESS NOTE ADULT - SUBJECTIVE AND OBJECTIVE BOX
INTERVAL HPI/OVERNIGHT EVENTS:  83y Male PMH HTN, CAD s/p stents on ASA/Plavix, RCC s/p L nephrectomy, bladder CA, BPH, CHF, LBBB, vertigo, HLD, 5.5 cm AAA without rupture post EVAR, paroxysmal afib on Eliquis, early stage Alzheimer's dementia, history of heparin induced thrombocytopenia, presented to [facility] 11/10/23 s/p unwitnessed fall with head strike at home found by wife, found with multicompartmental ICH, s/p R craniectomy 11/10/23, ultimately s/p cranioplasty 12/7/23, course significant for Afib with RVR, Klebsiella pneumonia, hypoxic respiratory failure, LUE DVT, multiple episodes of new ICH after attempting to resume Eliquis, now most recently with rapid response 12/21 with AMS, vomiting, aspiration, upgraded to MICU, course now significant for hypoxic respiratory failure likely 2/2 aspiration PNA (sputum cultures + for Klebsiella) with dual pressor septic shock now improved on only phenylephrine gtt, acute blood loss anemia with coffee ground emesis s/p 2U PRBC and now on protonix BID, afib RVR s/p digoxin load this AM.    Patient seen, off sedation. OE to voice, follows commands on right    Vital Signs Last 24 Hrs  T(C): 37.4 (24 Dec 2023 23:30), Max: 37.4 (24 Dec 2023 22:30)  T(F): 99.3 (24 Dec 2023 23:30), Max: 99.3 (24 Dec 2023 22:30)  HR: 85 (24 Dec 2023 23:30) (71 - 141)  BP: 145/69 (24 Dec 2023 22:00) (96/62 - 145/69)  BP(mean): 90 (24 Dec 2023 22:00) (69 - 107)  RR: 18 (24 Dec 2023 23:30) (0 - 26)  SpO2: 100% (24 Dec 2023 23:30) (96% - 100%)    Parameters below as of 24 Dec 2023 20:00  Patient On (Oxygen Delivery Method): ventilator    PHYSICAL EXAM:  GENERAL: NAD  HEAD: s/p R cranioplasty. Incision c/d/i- no drainage at time of examination or on this shift yet per RN  JOEY COMA SCORE: E-3 V-1T M-6 =10T  MENTAL STATUS: Awakes to voice, sustains eye opening. Intubated. following commands on right   CRANIAL NERVES: PERRL. Tracks examiner on right, difficult to assess on left with current wedged positioning on bed, facial symmetry difficult to accurately assess with ETT in place. Hearing appears intact   MOTOR: RUE able to bend at elbow and show thumbs up, RLE wiggles toes to command, HF at least 2-3/5. LUE trace movement to peripheral noxious; LLE withdraws to noxious  SENSATION: as above to noxious    LABS:                        7.4    7.30  )-----------( 125      ( 24 Dec 2023 03:15 )             22.1     12-24    135  |  102  |  43.6<H>  ----------------------------<  128<H>  2.9<LL>   |  20.0<L>  |  1.22    Ca    8.3<L>      24 Dec 2023 03:15  Phos  3.8     12-24  Mg     2.1     12-24    TPro  5.1<L>  /  Alb  2.4<L>  /  TBili  0.4  /  DBili  x   /  AST  56<H>  /  ALT  45<H>  /  AlkPhos  85  12-24    PT/INR - ( 24 Dec 2023 03:15 )   PT: 13.0 sec;   INR: 1.18 ratio      PTT - ( 24 Dec 2023 03:15 )  PTT:32.8 sec  Urinalysis Basic - ( 24 Dec 2023 03:15 )    Color: x / Appearance: x / SG: x / pH: x  Gluc: 128 mg/dL / Ketone: x  / Bili: x / Urobili: x   Blood: x / Protein: x / Nitrite: x   Leuk Esterase: x / RBC: x / WBC x   Sq Epi: x / Non Sq Epi: x / Bacteria: x    12-23 @ 07:01  -  12-24 @ 07:00  --------------------------------------------------------  IN: 3748.6 mL / OUT: 1550 mL / NET: 2198.6 mL    12-24 @ 07:01  -  12-24 @ 23:50  --------------------------------------------------------  IN: 1192 mL / OUT: 950 mL / NET: 242 mL    RADIOLOGY & ADDITIONAL TESTS:  CT Head No Cont (12.22.23 @ 03:51)  IMPRESSION:  Interval decrease in size of the simple density fluid collection, likely   either a pseudomeningocele or seroma, superficial to the right-sided   cranioplasty.  No other significant change. INTERVAL HPI/OVERNIGHT EVENTS:  83y Male PMH HTN, CAD s/p stents on ASA/Plavix, RCC s/p L nephrectomy, bladder CA, BPH, CHF, LBBB, vertigo, HLD, 5.5 cm AAA without rupture post EVAR, paroxysmal afib on Eliquis, early stage Alzheimer's dementia, history of heparin induced thrombocytopenia, presented to [facility] 11/10/23 s/p unwitnessed fall with head strike at home found by wife, found with multicompartmental ICH, s/p R craniectomy 11/10/23, ultimately s/p cranioplasty 12/7/23, course significant for Afib with RVR, Klebsiella pneumonia, hypoxic respiratory failure, LUE DVT, multiple episodes of new ICH after attempting to resume Eliquis, now most recently with rapid response 12/21 with AMS, vomiting, aspiration, upgraded to MICU, course now significant for hypoxic respiratory failure likely 2/2 aspiration PNA (sputum cultures + for Klebsiella) with dual pressor septic shock now improved on only phenylephrine gtt, acute blood loss anemia with coffee ground emesis s/p 2U PRBC and now on protonix BID, afib RVR s/p digoxin load this AM.    Patient seen, off sedation. OE to voice, follows commands on right    Vital Signs Last 24 Hrs  T(C): 37.4 (24 Dec 2023 23:30), Max: 37.4 (24 Dec 2023 22:30)  T(F): 99.3 (24 Dec 2023 23:30), Max: 99.3 (24 Dec 2023 22:30)  HR: 85 (24 Dec 2023 23:30) (71 - 141)  BP: 145/69 (24 Dec 2023 22:00) (96/62 - 145/69)  BP(mean): 90 (24 Dec 2023 22:00) (69 - 107)  RR: 18 (24 Dec 2023 23:30) (0 - 26)  SpO2: 100% (24 Dec 2023 23:30) (96% - 100%)    Parameters below as of 24 Dec 2023 20:00  Patient On (Oxygen Delivery Method): ventilator    PHYSICAL EXAM:  GENERAL: NAD  HEAD: s/p R cranioplasty. Incision c/d/i- no drainage at time of examination or on this shift yet per RN  JOEY COMA SCORE: E-3 V-1T M-6 =10T  MENTAL STATUS: Awakes to voice, sustains eye opening. Intubated. following commands on right   CRANIAL NERVES: PERRL. Tracks examiner on right, difficult to assess on left with current wedged positioning on bed, facial symmetry difficult to accurately assess with ETT in place. Hearing appears intact   MOTOR: RUE able to bend at elbow and show thumbs up, RLE wiggles toes to command, HF at least 2-3/5. LUE trace movement to peripheral noxious; LLE withdraws to noxious  SENSATION: as above to noxious    LABS:                        7.4    7.30  )-----------( 125      ( 24 Dec 2023 03:15 )             22.1     12-24    135  |  102  |  43.6<H>  ----------------------------<  128<H>  2.9<LL>   |  20.0<L>  |  1.22    Ca    8.3<L>      24 Dec 2023 03:15  Phos  3.8     12-24  Mg     2.1     12-24    TPro  5.1<L>  /  Alb  2.4<L>  /  TBili  0.4  /  DBili  x   /  AST  56<H>  /  ALT  45<H>  /  AlkPhos  85  12-24    PT/INR - ( 24 Dec 2023 03:15 )   PT: 13.0 sec;   INR: 1.18 ratio      PTT - ( 24 Dec 2023 03:15 )  PTT:32.8 sec  Urinalysis Basic - ( 24 Dec 2023 03:15 )    Color: x / Appearance: x / SG: x / pH: x  Gluc: 128 mg/dL / Ketone: x  / Bili: x / Urobili: x   Blood: x / Protein: x / Nitrite: x   Leuk Esterase: x / RBC: x / WBC x   Sq Epi: x / Non Sq Epi: x / Bacteria: x    12-23 @ 07:01  -  12-24 @ 07:00  --------------------------------------------------------  IN: 3748.6 mL / OUT: 1550 mL / NET: 2198.6 mL    12-24 @ 07:01  -  12-24 @ 23:50  --------------------------------------------------------  IN: 1192 mL / OUT: 950 mL / NET: 242 mL    RADIOLOGY & ADDITIONAL TESTS:  CT Head No Cont (12.22.23 @ 03:51)  IMPRESSION:  Interval decrease in size of the simple density fluid collection, likely   either a pseudomeningocele or seroma, superficial to the right-sided   cranioplasty.  No other significant change.

## 2023-12-24 NOTE — PROGRESS NOTE ADULT - SUBJECTIVE AND OBJECTIVE BOX
Chief Complaint:  Patient is a 83y old  Male who presents with a chief complaint of S/p unwitnessed fall with head strike (23 Dec 2023 17:08)      Interval Events / Subjective: Patient seen and examined at bedside. Discussed with bedside RN, large yellow bowel movement, no bleeding         MEDICATIONS:   MEDICATIONS  (STANDING):  atorvastatin 80 milliGRAM(s) Oral at bedtime  chlorhexidine 2% Cloths 1 Application(s) Topical <User Schedule>  doxazosin 2 milliGRAM(s) Oral at bedtime  folic acid 1 milliGRAM(s) Oral daily  hydrocortisone sodium succinate Injectable 50 milliGRAM(s) IV Push every 8 hours  lactated ringers. 1000 milliLiter(s) (75 mL/Hr) IV Continuous <Continuous>  levETIRAcetam   Injectable 1000 milliGRAM(s) IV Push every 12 hours  memantine 5 milliGRAM(s) Oral two times a day  meropenem Injectable 1000 milliGRAM(s) IV Push every 12 hours  Nephro-roma 1 Tablet(s) Oral daily  pantoprazole Infusion 8 mG/Hr (10 mL/Hr) IV Continuous <Continuous>  phenylephrine    Infusion 3 MICROgram(s)/kG/Min (40.9 mL/Hr) IV Continuous <Continuous>  polyethylene glycol 3350 17 Gram(s) Oral daily  potassium chloride  20 mEq/100 mL IVPB 20 milliEquivalent(s) IV Intermittent every 2 hours  propofol Infusion 10 MICROgram(s)/kG/Min (4.37 mL/Hr) IV Continuous <Continuous>  senna 2 Tablet(s) Oral at bedtime  vancomycin  IVPB 1250 milliGRAM(s) IV Intermittent every 24 hours  vasopressin Infusion 0.04 Unit(s)/Min (6 mL/Hr) IV Continuous <Continuous>    MEDICATIONS  (PRN):  acetaminophen     Tablet .. 650 milliGRAM(s) Oral every 6 hours PRN Temp greater or equal to 38C (100.4F), Mild Pain (1 - 3)  artificial tears (preservative free) Ophthalmic Solution 1 Drop(s) Both EYES every 6 hours PRN Dry Eyes  metoprolol tartrate Injectable 5 milliGRAM(s) IV Push every 6 hours PRN HR > 120      ALLERGIES:   Allergies    penicillin (Rash)  heparin (Other (Severe))  penicillin (Hives)  Cipro (Other)  Levaquin (Other)  heparin (Other)    Intolerances        VITAL SIGNS:   Vital Signs Last 24 Hrs  T(C): 37.2 (24 Dec 2023 07:45), Max: 38.2 (23 Dec 2023 14:45)  T(F): 99 (24 Dec 2023 07:45), Max: 100.8 (23 Dec 2023 14:45)  HR: 86 (24 Dec 2023 07:45) (73 - 141)  BP: 120/98 (24 Dec 2023 06:00) (94/59 - 138/52)  BP(mean): 107 (24 Dec 2023 06:00) (69 - 107)  RR: 15 (24 Dec 2023 07:45) (15 - 30)  SpO2: 100% (24 Dec 2023 07:45) (96% - 100%)    Parameters below as of 23 Dec 2023 19:00  Patient On (Oxygen Delivery Method): ventilator    O2 Concentration (%): 30  I&O's Summary    23 Dec 2023 07:01  -  24 Dec 2023 07:00  --------------------------------------------------------  IN: 3748.6 mL / OUT: 1550 mL / NET: 2198.6 mL        PHYSICAL EXAM:   GENERAL:  intubated  HEENT:  NC/AT,  conjunctivae clear, sclera -anicteric  CHEST:  Full & symmetric excursion, no increased effort, breath sounds clear  HEART:  tachycardic  ABDOMEN:  Soft, non-tender, non-distended, normoactive bowel sounds,  no rebound or guarding  NEURO:  sedated    LABS:  CBC Full  -  ( 24 Dec 2023 03:15 )  WBC Count : 7.30 K/uL  RBC Count : 2.41 M/uL  Hemoglobin : 7.4 g/dL  Hematocrit : 22.1 %  Platelet Count - Automated : 125 K/uL  Mean Cell Volume : 91.7 fl  Mean Cell Hemoglobin : 30.7 pg  Mean Cell Hemoglobin Concentration : 33.5 gm/dL  Auto Neutrophil # : 6.44 K/uL  Auto Lymphocyte # : 0.41 K/uL  Auto Monocyte # : 0.42 K/uL  Auto Eosinophil # : 0.00 K/uL  Auto Basophil # : 0.02 K/uL  Auto Neutrophil % : 88.2 %  Auto Lymphocyte % : 5.6 %  Auto Monocyte % : 5.8 %  Auto Eosinophil % : 0.0 %  Auto Basophil % : 0.3 %    12-24    135  |  102  |  43.6<H>  ----------------------------<  128<H>  2.9<LL>   |  20.0<L>  |  1.22    Ca    8.3<L>      24 Dec 2023 03:15  Phos  3.8     12-24  Mg     2.1     12-24    TPro  5.1<L>  /  Alb  2.4<L>  /  TBili  0.4  /  DBili  x   /  AST  56<H>  /  ALT  45<H>  /  AlkPhos  85  12-24    LIVER FUNCTIONS - ( 24 Dec 2023 03:15 )  Alb: 2.4 g/dL / Pro: 5.1 g/dL / ALK PHOS: 85 U/L / ALT: 45 U/L / AST: 56 U/L / GGT: x           PT/INR - ( 24 Dec 2023 03:15 )   PT: 13.0 sec;   INR: 1.18 ratio         PTT - ( 24 Dec 2023 03:15 )  PTT:32.8 sec  Urinalysis Basic - ( 24 Dec 2023 03:15 )    Color: x / Appearance: x / SG: x / pH: x  Gluc: 128 mg/dL / Ketone: x  / Bili: x / Urobili: x   Blood: x / Protein: x / Nitrite: x   Leuk Esterase: x / RBC: x / WBC x   Sq Epi: x / Non Sq Epi: x / Bacteria: x        Culture - Blood (collected 22 Dec 2023 22:41)  Source: .Blood Blood-Peripheral  Preliminary Report (24 Dec 2023 04:02):    No growth at 24 hours    Culture - Blood (collected 22 Dec 2023 22:30)  Source: .Blood Blood-Peripheral  Preliminary Report (24 Dec 2023 04:02):    No growth at 24 hours    Culture - Fungal, CSF (collected 21 Dec 2023 19:44)  Source: .CSF CSF  Preliminary Report (23 Dec 2023 15:03):    Culture is being performed. Fungal cultures are held for 4 weeks.    Culture - CSF with Gram Stain (collected 21 Dec 2023 19:44)  Source: .CSF CSF  Gram Stain (22 Dec 2023 02:07):    polymorphonuclear leukocytes seen    No organisms seen    by cytocentrifuge  Preliminary Report (22 Dec 2023 16:44):    No growth    Culture - Acid Fast - CSF (collected 21 Dec 2023 19:44)  Source: .CSF CSF  Preliminary Report (23 Dec 2023 15:08):    Culture is being performed.    Culture - Sputum (collected 21 Dec 2023 10:10)  Source: ET Tube ET Tube  Gram Stain (21 Dec 2023 23:16):    Few polymorphonuclear leukocytes per low power field    Rare Squamous epithelial cells per low power field    Moderate Yeast like cells per oil power field    Moderate Gram Positive Rods per oil power field    Few Gram positive cocci in pairs per oil powerfield    Rare Gram Negative Rods per oil power field  Final Report (23 Dec 2023 12:29):    Numerous Klebsiella aerogenes (Previously Enterobacter)    Normal Respiratory Isabell present  Organism: Klebsiella aerogenes (Previously Enterobacter) (23 Dec 2023 12:29)  Organism: Klebsiella aerogenes (Previously Enterobacter) (23 Dec 2023 12:29)  Organism: Klebsiella aerogenes (Previously Enterobacter) (23 Dec 2023 12:29)        RADIOLOGY & ADDITIONAL STUDIES (The following images were personally reviewed):

## 2023-12-24 NOTE — PROGRESS NOTE ADULT - ASSESSMENT
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, s/p R craniectomy 11/10/23, ultimately s/p cranioplasty 12/7/23, course significant for Afib with RVR, Klebsiella pneumonia, hypoxic respiratory failure, LUE DVT, multiple episodes of new ICH after attempting to resume Eliquis, now most recently with rapid response 12/21 with AMS, vomiting, aspiration, upgraded to MICU, course now significant for hypoxic respiratory failure 2/2 aspiration PNA (sputum cultures + for Klebsiella) with dual pressor septic shock now improved on only phenylephrine gtt, acute blood loss anemia with coffee ground emesis s/p 2U PRBC now on protonix BID, afib RVR s/p digoxin load this AM.  - 12/24 EEG remains negative, d/maryellen  - Exam grossly stable    PLAN:  - Will d/w attending  - Q4 neuro checks  - Continue Keppra 1g BID  - Monitor incision- d/w RN to notify us for any drainage  - SCDs for DVT ppx for now- will d/w when ok to resume chemical DVT ppx from neurosurgical perspective and if otherwise cleared with recent anemia/coffee ground emesis  - Continue holding ASA and Eliquis- unsure when and if patient would be a safe candidate to resume with multiple episodes of new ICH after resuming  - Supportive care/further medical management per MICU 83y Male PMH HTN, CAD s/p stents on ASA/Plavix, RCC s/p L nephrectomy, bladder CA, BPH, CHF, LBBB, vertigo, HLD, 5.5 cm AAA without rupture post EVAR, paroxysmal afib on Eliquis, early stage Alzheimer's dementia, history of heparin induced thrombocytopenia, presented to [facility] 11/10/23 s/p unwitnessed fall with head strike at home found by wife, found with multicompartmental ICH, s/p R craniectomy 11/10/23, ultimately s/p cranioplasty 12/7/23, course significant for Afib with RVR, Klebsiella pneumonia, hypoxic respiratory failure, LUE DVT, multiple episodes of new ICH after attempting to resume Eliquis, now most recently with rapid response 12/21 with AMS, vomiting, aspiration, upgraded to MICU, course now significant for hypoxic respiratory failure 2/2 aspiration PNA (sputum cultures + for Klebsiella) with dual pressor septic shock now improved on only phenylephrine gtt, acute blood loss anemia with coffee ground emesis s/p 2U PRBC now on protonix BID, afib RVR s/p digoxin load this AM.  - 12/24 EEG remains negative, d/maryellen  - Exam grossly stable    PLAN:  - Will d/w attending  - Q4 neuro checks  - Continue Keppra 1g BID  - Monitor incision- d/w RN to notify us for any drainage  - SCDs for DVT ppx for now- will d/w when ok to resume chemical DVT ppx from neurosurgical perspective and if otherwise cleared with recent anemia/coffee ground emesis  - Continue holding ASA and Eliquis- unsure when and if patient would be a safe candidate to resume with multiple episodes of new ICH after resuming  - Supportive care/further medical management per MICU 83y Male PMH HTN, CAD s/p stents on ASA/Plavix, RCC s/p L nephrectomy, bladder CA, BPH, CHF, LBBB, vertigo, HLD, 5.5 cm AAA without rupture post EVAR, paroxysmal afib on Eliquis, early stage Alzheimer's dementia, history of heparin induced thrombocytopenia, presented to [facility] 11/10/23 s/p unwitnessed fall with head strike at home found by wife, found with multicompartmental ICH, s/p R craniectomy 11/10/23, ultimately s/p cranioplasty 12/7/23, course significant for Afib with RVR, Klebsiella pneumonia, hypoxic respiratory failure, LUE DVT, multiple episodes of new ICH after attempting to resume Eliquis, now most recently with rapid response 12/21 with AMS, vomiting, aspiration, upgraded to MICU, course now significant for hypoxic respiratory failure 2/2 aspiration PNA (sputum cultures + for Klebsiella) with dual pressor septic shock now improved on only phenylephrine gtt, acute blood loss anemia with coffee ground emesis s/p 2U PRBC now on protonix BID, afib RVR s/p digoxin load this AM.  - 12/24 EEG remains negative, d/maryellen  - Exam grossly stable    PLAN:  - Will d/w attending  - Q4 neuro checks  - Continue Keppra 1g BID  - Monitor incision- d/w RN to notify us for any drainage  - SCDs for DVT ppx for now- will d/w when ok to resume chemical DVT ppx and ADSA from neurosurgical perspective and if otherwise cleared with recent anemia/coffee ground emesis  - Continue holding Eliquis- unsure when and if patient would be a safe candidate to resume with multiple episodes of new ICH after attempting to resume  - Supportive care/further medical management per MICU 83y Male PMH HTN, CAD s/p stents on ASA/Plavix, RCC s/p L nephrectomy, bladder CA, BPH, CHF, LBBB, vertigo, HLD, 5.5 cm AAA without rupture post EVAR, paroxysmal afib on Eliquis, early stage Alzheimer's dementia, history of heparin induced thrombocytopenia, presented to [facility] 11/10/23 s/p unwitnessed fall with head strike at home found by wife, found with multicompartmental ICH, s/p R craniectomy 11/10/23, ultimately s/p cranioplasty 12/7/23, course significant for Afib with RVR, Klebsiella pneumonia, hypoxic respiratory failure, LUE DVT, multiple episodes of new ICH after attempting to resume Eliquis, now most recently with rapid response 12/21 with AMS, vomiting, aspiration, upgraded to MICU, course now significant for hypoxic respiratory failure 2/2 aspiration PNA (sputum cultures + for Klebsiella) with dual pressor septic shock now improved on only phenylephrine gtt, acute blood loss anemia with coffee ground emesis s/p 2U PRBC now on protonix BID, afib RVR s/p digoxin load this AM.  - 12/24 EEG remains negative, d/maryellen  - Exam grossly stable    PLAN:  - Will d/w attending  - Q4 neuro checks  - Continue Keppra 1g BID  - Monitor incision- d/w RN to notify us for any drainage  - SCDs for DVT ppx for now- will d/w when ok to resume chemical DVT ppx and ADSA from neurosurgical perspective and if otherwise cleared with recent anemia/coffee ground emesis  - Continue holding Eliquis- unsure when and if patient would be a safe candidate to resume with multiple episodes of new ICH after attempting to resume  - Supportive care/further medical management per MICU

## 2023-12-24 NOTE — PROGRESS NOTE ADULT - ASSESSMENT
83 year old male with a history of CAD s/p PCI, CHF, AA here with a fall now with septic shock due to PNA/UTI, and ICH and concern for UGIB    Coffee Ground Emesis  Anemia  No active bleeding   Given his presentation, DDX includes stress ulcer/gastritis, AVM  Avoid nsaids   Maintain HGB >8  Continue PPI  Please call if there is a change in his clinical status that warrants endoscopic intervention

## 2023-12-24 NOTE — EEG REPORT - NS EEG TEXT BOX
[facility]   COMPREHENSIVE EPILEPSY CENTER   REPORT OF CONTINUOUS VIDEO EEG     [facility]: [address], [place] [address], Ph#: [phone]  [facility]: [id number], [place] [address], Ph#: [phone]  [facility]: [address] [place] [address], Ph#: [phone]    Patient Name: JAMARI BALL  Age and : 83y ([birth year])  MRN #: [id number]  Location: [place] [address]  Referring Physician: Jamari Flores    Start Time/Date: 0800 on 2023  End Time/Date: 23:30 on 23  Duration: 15H 30min    _____________________________________________________________  STUDY INFORMATION    EEG Recording Technique:  The patient underwent continuous Video-EEG monitoring, using Telemetry System hardware on the XLTek Digital System. EEG and video data were stored on a computer hard drive with important events saved in digital archive files. The material was reviewed by a physician (electroencephalographer / epileptologist) on a daily basis. Taras and seizure detection algorithms were utilized and reviewed. An EEG Technician attended to the patient, and was available throughout daytime work hours.  The epilepsy center neurologist was available in person or on call 24-hours per day.    EEG Placement and Labeling of Electrodes:  The EEG was performed utilizing 20 channel referential EEG connections (coronal over temporal over parasagittal montage) using all standard 10-20 electrode placements with EKG, with additional electrodes placed in the inferior temporal region using the modified 10-10 montage electrode placements for elective admissions, or if deemed necessary. Recording was at a sampling rate of 256 samples per second per channel. Time synchronized digital video recording was done simultaneously with EEG recording. A low light infrared camera was used for low light recording.     _____________________________________________________________  HISTORY    Patient is a 83y old  Male who presents with a chief complaint of s/p unwitnessed fall with head strike (21 Dec 2023 20:07)      PERTINENT MEDICATION:  MEDICATIONS  (STANDING):  atorvastatin 80 milliGRAM(s) Oral at bedtime  chlorhexidine 2% Cloths 1 Application(s) Topical <User Schedule>  doxazosin 2 milliGRAM(s) Oral at bedtime  folic acid 1 milliGRAM(s) Oral daily  hydrocortisone sodium succinate Injectable 100 milliGRAM(s) IV Push every 8 hours  lactated ringers. 1000 milliLiter(s) (75 mL/Hr) IV Continuous <Continuous>  levETIRAcetam  IVPB 500 milliGRAM(s) IV Intermittent every 12 hours  memantine 5 milliGRAM(s) Oral two times a day  meropenem Injectable 1000 milliGRAM(s) IV Push every 8 hours  Nephro-roma 1 Tablet(s) Oral daily  norepinephrine Infusion 0.7 MICROgram(s)/kG/Min (47.7 mL/Hr) IV Continuous <Continuous>  pantoprazole  Injectable 40 milliGRAM(s) IV Push every 12 hours  polyethylene glycol 3350 17 Gram(s) Oral daily  propofol Infusion 10 MICROgram(s)/kG/Min (4.37 mL/Hr) IV Continuous <Continuous>  senna 2 Tablet(s) Oral at bedtime  vancomycin  IVPB 1000 milliGRAM(s) IV Intermittent <User Schedule>  vasopressin Infusion 0.04 Unit(s)/Min (6 mL/Hr) IV Continuous <Continuous>    _____________________________________________________________  STUDY INTERPRETATION    Findings: The background was continuous, spontaneously variable and reactive.   No posterior dominant rhythm seen.  Background predominantly consisted of theta, delta and faster activities.    Focal Slowing:   Near continuous theta/delta slowing in the right anterior head region.    Sleep Background:  Drowsiness and stage II sleep transients were not recorded.    Other Non-Epileptiform Findings:  Breach effect over the right hemisphere characterized by higher amplitude, sharply contoured waves and fast activities.    Interictal Epileptiform Activity:   Occasional right fronto-temporal sharp-wave discharges (max F8).    Events:  Clinical events: None recorded.  Seizures: None recorded.    Activation Procedures:   Hyperventilation was not performed.    Photic stimulation was not performed.     Artifacts:  Intermittent myogenic and movement artifacts were noted.    _____________________________________________________________  EEG SUMMARY/CLASSIFICATION    Abnormal EEG   - Right anterior slowing and sharp-wave discharges  - Moderate generalized slowing.  - Right hemispheric breach artifact.  _____________________________________________________________  EEG IMPRESSION/CLINICAL CORRELATE    Abnormal EEG study.  Potential epileptogenic focus in the right anterior head region with underlying cerebral dysfunction and evidence for overlying skull defect  Moderate nonspecific diffuse or multifocal cerebral dysfunction.   No seizure seen.  _____________________________________________________________    Brendon Chatman MD   of Neurology  Epilepsy/EEG Attending   [facility]   COMPREHENSIVE EPILEPSY CENTER   REPORT OF CONTINUOUS VIDEO EEG     [facility]: [address], [place] [address], Ph#: [phone]  [facility]: [id number], [place] [address], Ph#: [phone]  [facility]: [address] [place] [address], Ph#: [phone]    Patient Name: JAMARI BALL  Age and : 83y ([birth year])  MRN #: [id number]  Location: [place] [address]  Referring Physician: Jamari Flores    Start Time/Date: 0800 on 2023  End Time/Date: 23:30 on 23  Duration: 15H 30min    _____________________________________________________________  STUDY INFORMATION    EEG Recording Technique:  The patient underwent continuous Video-EEG monitoring, using Telemetry System hardware on the XLTek Digital System. EEG and video data were stored on a computer hard drive with important events saved in digital archive files. The material was reviewed by a physician (electroencephalographer / epileptologist) on a daily basis. Taras and seizure detection algorithms were utilized and reviewed. An EEG Technician attended to the patient, and was available throughout daytime work hours.  The epilepsy center neurologist was available in person or on call 24-hours per day.    EEG Placement and Labeling of Electrodes:  The EEG was performed utilizing 20 channel referential EEG connections (coronal over temporal over parasagittal montage) using all standard 10-20 electrode placements with EKG, with additional electrodes placed in the inferior temporal region using the modified 10-10 montage electrode placements for elective admissions, or if deemed necessary. Recording was at a sampling rate of 256 samples per second per channel. Time synchronized digital video recording was done simultaneously with EEG recording. A low light infrared camera was used for low light recording.     _____________________________________________________________  HISTORY    Patient is a 83y old  Male who presents with a chief complaint of s/p unwitnessed fall with head strike (21 Dec 2023 20:07)      PERTINENT MEDICATION:  MEDICATIONS  (STANDING):  atorvastatin 80 milliGRAM(s) Oral at bedtime  chlorhexidine 2% Cloths 1 Application(s) Topical <User Schedule>  doxazosin 2 milliGRAM(s) Oral at bedtime  folic acid 1 milliGRAM(s) Oral daily  hydrocortisone sodium succinate Injectable 100 milliGRAM(s) IV Push every 8 hours  lactated ringers. 1000 milliLiter(s) (75 mL/Hr) IV Continuous <Continuous>  levETIRAcetam  IVPB 500 milliGRAM(s) IV Intermittent every 12 hours  memantine 5 milliGRAM(s) Oral two times a day  meropenem Injectable 1000 milliGRAM(s) IV Push every 8 hours  Nephro-roma 1 Tablet(s) Oral daily  norepinephrine Infusion 0.7 MICROgram(s)/kG/Min (47.7 mL/Hr) IV Continuous <Continuous>  pantoprazole  Injectable 40 milliGRAM(s) IV Push every 12 hours  polyethylene glycol 3350 17 Gram(s) Oral daily  propofol Infusion 10 MICROgram(s)/kG/Min (4.37 mL/Hr) IV Continuous <Continuous>  senna 2 Tablet(s) Oral at bedtime  vancomycin  IVPB 1000 milliGRAM(s) IV Intermittent <User Schedule>  vasopressin Infusion 0.04 Unit(s)/Min (6 mL/Hr) IV Continuous <Continuous>    _____________________________________________________________  STUDY INTERPRETATION    Findings: The background was continuous, spontaneously variable and reactive.   No posterior dominant rhythm seen.  Background predominantly consisted of theta, delta and faster activities.    Focal Slowing:   Near continuous theta/delta slowing in the right anterior head region.    Sleep Background:  Drowsiness and stage II sleep transients were not recorded.    Other Non-Epileptiform Findings:  Breach effect over the right hemisphere characterized by higher amplitude, sharply contoured waves and fast activities.    Interictal Epileptiform Activity:   Occasional right fronto-temporal sharp-wave discharges (max F8).    Events:  Clinical events: None recorded.  Seizures: None recorded.    Activation Procedures:   Hyperventilation was not performed.    Photic stimulation was not performed.     Artifacts:  Intermittent myogenic and movement artifacts were noted.    _____________________________________________________________  EEG SUMMARY/CLASSIFICATION    Abnormal EEG   - Right anterior slowing and sharp-wave discharges  - Moderate generalized slowing.  - Right hemispheric breach artifact.  _____________________________________________________________  EEG IMPRESSION/CLINICAL CORRELATE    Abnormal EEG study.  Potential epileptogenic focus in the right anterior head region with underlying cerebral dysfunction and evidence for overlying skull defect  Moderate nonspecific diffuse or multifocal cerebral dysfunction.   No seizure seen.  _____________________________________________________________    Brendon Chatman MD   of Neurology  Epilepsy/EEG Attending

## 2023-12-24 NOTE — PROGRESS NOTE ADULT - SUBJECTIVE AND OBJECTIVE BOX
Patient is a 83y old  Male who presents with a chief complaint of s/p unwitnessed fall with head strike (24 Dec 2023 08:02)      BRIEF HOSPITAL COURSE: ***    Events last 24 hours: ***    PAST MEDICAL & SURGICAL HISTORY:  HTN  dx 2008      Bladder Cancer (ICD9 188.9)  TCC, dx 1999      Hyperlipemia (ICD9 272.4)  dx 2008      Lt Renal Neoplasm  left - s/p left nephrectomy      Hx antineoplastic chemotherapy (BCG 2012)      Left bundle branch block      Vertigo      Heel spur, left      Abdominal aortic aneurysm (AAA)  5.5 cm      BPH (benign prostatic hyperplasia)      Renal mass, right  current - following with Dr Santiago      CAD (coronary artery disease)      Bladder cancer, primary, with metastasis from bladder to other site  Left kidney      Acute renal failure, unspecified acute renal failure type      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)      Abdominal aortic aneurysm (AAA) without rupture      Congestive heart failure, unspecified chronicity, unspecified heart failure type      Hypertension      History of abdominal aortic aneurysm (AAA)      CAD (coronary artery disease)      Alzheimers disease      urethral Stent 7/2008  stent placement and removal      S/P Cystoscopy  bladder polyps removal multiple times (since 1999), 6/10 , 10/2011 & 10/17/2011, 5/12, 11/2012, last 5/13/13, 12/2013, 7/2014      S/P Tonsillectomy      History of Lt  Nephrectomy  6/10 - left      History of cystoscopy  April 2015      H/O abdominal aortic aneurysm repair      S/P AAA repair      History of nephrectomy  Left      Presence of stent in LAD coronary artery      History of heart artery stent  DIONICIO          Review of Systems:  CONSTITUTIONAL: No fever, chills, or fatigue  EYES: No eye pain, visual disturbances, or discharge  ENMT:  No difficulty hearing, tinnitus, vertigo; No sinus or throat pain  NECK: No pain or stiffness  RESPIRATORY: No cough, wheezing, chills or hemoptysis; No shortness of breath  CARDIOVASCULAR: No chest pain, palpitations, dizziness, or leg swelling  GASTROINTESTINAL: No abdominal or epigastric pain. No nausea, vomiting, or hematemesis; No diarrhea or constipation. No melena or hematochezia.  GENITOURINARY: No dysuria, frequency, hematuria, or incontinence  NEUROLOGICAL: No headaches, memory loss, loss of strength, numbness, or tremors  SKIN: No itching, burning, rashes, or lesions   MUSCULOSKELETAL: No joint pain or swelling; No muscle, back, or extremity pain  PSYCHIATRIC: No depression, anxiety, mood swings, or difficulty sleeping      Medications:  meropenem Injectable 1000 milliGRAM(s) IV Push every 12 hours    doxazosin 2 milliGRAM(s) Oral at bedtime  metoprolol tartrate Injectable 5 milliGRAM(s) IV Push every 6 hours PRN  phenylephrine    Infusion 3 MICROgram(s)/kG/Min IV Continuous <Continuous>      acetaminophen     Tablet .. 650 milliGRAM(s) Oral every 6 hours PRN  levETIRAcetam   Injectable 1000 milliGRAM(s) IV Push every 12 hours  memantine 5 milliGRAM(s) Oral two times a day  propofol Infusion 10 MICROgram(s)/kG/Min IV Continuous <Continuous>        pantoprazole Infusion 8 mG/Hr IV Continuous <Continuous>  polyethylene glycol 3350 17 Gram(s) Oral daily  senna 2 Tablet(s) Oral at bedtime      atorvastatin 80 milliGRAM(s) Oral at bedtime  hydrocortisone sodium succinate Injectable 50 milliGRAM(s) IV Push every 8 hours  vasopressin Infusion 0.04 Unit(s)/Min IV Continuous <Continuous>    folic acid 1 milliGRAM(s) Oral daily  lactated ringers. 1000 milliLiter(s) IV Continuous <Continuous>  Nephro-roma 1 Tablet(s) Oral daily  potassium chloride  20 mEq/100 mL IVPB 20 milliEquivalent(s) IV Intermittent every 2 hours      artificial tears (preservative free) Ophthalmic Solution 1 Drop(s) Both EYES every 6 hours PRN  chlorhexidine 2% Cloths 1 Application(s) Topical <User Schedule>        Mode: AC/ CMV (Assist Control/ Continuous Mandatory Ventilation)  RR (machine): 14  TV (machine): 450  FiO2: 30  PEEP: 6  ITime: 1  MAP: 9  PIP: 18      ICU Vital Signs Last 24 Hrs  T(C): 37.2 (24 Dec 2023 09:15), Max: 38.2 (23 Dec 2023 14:45)  T(F): 99 (24 Dec 2023 09:15), Max: 100.8 (23 Dec 2023 14:45)  HR: 120 (24 Dec 2023 09:32) (73 - 141)  BP: 100/63 (24 Dec 2023 08:00) (94/59 - 138/52)  BP(mean): 69 (24 Dec 2023 08:00) (69 - 107)  ABP: 116/55 (24 Dec 2023 09:15) (95/48 - 157/41)  ABP(mean): 77 (24 Dec 2023 09:15) (61 - 89)  RR: 12 (24 Dec 2023 09:15) (0 - 30)  SpO2: 100% (24 Dec 2023 09:32) (96% - 100%)    O2 Parameters below as of 23 Dec 2023 19:00  Patient On (Oxygen Delivery Method): ventilator    O2 Concentration (%): 30            I&O's Detail    23 Dec 2023 07:01  -  24 Dec 2023 07:00  --------------------------------------------------------  IN:    Albumin 5% - 50 mL: 50 mL    IV PiggyBack: 350 mL    IV PiggyBack: 100 mL    IV PiggyBack: 200 mL    Lactated Ringers: 1650 mL    Lactated Ringers Bolus: 500 mL    Pantoprazole: 100 mL    Phenylephrine: 254.1 mL    PRBCs (Packed Red Blood Cells): 274 mL    Propofol: 126.5 mL    Vasopressin: 144 mL  Total IN: 3748.6 mL    OUT:    Indwelling Catheter - Urethral (mL): 1550 mL  Total OUT: 1550 mL    Total NET: 2198.6 mL      24 Dec 2023 07:01  -  24 Dec 2023 09:38  --------------------------------------------------------  IN:    Lactated Ringers: 150 mL    Pantoprazole: 20 mL    Phenylephrine: 11 mL    Propofol: 8 mL    Vasopressin: 12 mL  Total IN: 201 mL    OUT:    Indwelling Catheter - Urethral (mL): 45 mL  Total OUT: 45 mL    Total NET: 156 mL            LABS:                        7.4    7.30  )-----------( 125      ( 24 Dec 2023 03:15 )             22.1     12-24    135  |  102  |  43.6<H>  ----------------------------<  128<H>  2.9<LL>   |  20.0<L>  |  1.22    Ca    8.3<L>      24 Dec 2023 03:15  Phos  3.8     12-24  Mg     2.1     12-24    TPro  5.1<L>  /  Alb  2.4<L>  /  TBili  0.4  /  DBili  x   /  AST  56<H>  /  ALT  45<H>  /  AlkPhos  85  12-24          CAPILLARY BLOOD GLUCOSE        PT/INR - ( 24 Dec 2023 03:15 )   PT: 13.0 sec;   INR: 1.18 ratio         PTT - ( 24 Dec 2023 03:15 )  PTT:32.8 sec  Urinalysis Basic - ( 24 Dec 2023 03:15 )    Color: x / Appearance: x / SG: x / pH: x  Gluc: 128 mg/dL / Ketone: x  / Bili: x / Urobili: x   Blood: x / Protein: x / Nitrite: x   Leuk Esterase: x / RBC: x / WBC x   Sq Epi: x / Non Sq Epi: x / Bacteria: x      CULTURES:  Culture Results:   No growth at 24 hours (12-22-23 @ 22:41)  Culture Results:   No growth at 24 hours (12-22-23 @ 22:30)  Culture Results:   Culture is being performed. (12-21-23 @ 19:44)  Culture Results:   No growth (12-21-23 @ 19:44)  Culture Results:   Culture is being performed. Fungal cultures are held for 4 weeks. (12-21-23 @ 19:44)  Culture Results:   Numerous Klebsiella aerogenes (Previously Enterobacter)  Normal Respiratory Isabell present (12-21-23 @ 10:10)  Rapid RVP Result: NotDetec (12-20-23 @ 09:55)  Culture Results:   No growth at 72 Hours (12-20-23 @ 07:32)  Culture Results:   No growth at 72 Hours (12-20-23 @ 07:25)      Physical Examination:    General: No acute distress.  Alert, oriented, interactive, nonfocal, following simple commands and moving all extremities     HEENT: Pupils equal, reactive to light.  Symmetric.    PULM: Breathing comfortabley, good BS B/L with no Rales or Rhonchi, no significant sputum production    CVS: Regular rate and rhythm, no murmurs, rubs, or gallops    ABD: BS+ Soft, nondistended, nontender, no masses    EXT: No edema, nontender    SKIN: Warm and well perfused, no rashes noted.    RADIOLOGY: ***    CRITICAL CARE TIME SPENT: *** mins of time have been spent evaluating, reviewing all available lab and radiologic material, reviewing the EMR and treating this critically ill patient, who has complex medical problems and life threatening organ dysfunction. This also included speaking with the staff, consultants, and family.     Patient is a 83y old  Male who presents with a chief complaint of s/p unwitnessed fall with head strike (24 Dec 2023 08:02)      BRIEF HOSPITAL COURSE: 83M with HTN, CAD s/p PCO, RCC s/p left nephrectomy, bladder CA, BPH, CHF, Vertigo, HLD, AAA s/p EVAR, parox Afib, h/o HIT, Alzheimer's, initially admitted on 11/10 s/p fall with Rt frontal IPH s/p R hemicraniectomy on 11/10 with drain placement. Hospital course complicated by AFib with RVR, AHRF with Klebsiella PNA, LUE DVT and questionable increase in AAA size. Started on AC with waxing/waning mental status prompting head imaging with evidence of new bleed, so systemic A/C held. TTE showed mobile echodensity on aortic valve and strokes noted within multiple arterial territories, WHIT was recommended but family declined. Pt underwent cranioplasty on 12/7, and had subgaleal drain removed 12/10. ABx started for UTI. Pt re-upgraded to NSICU on 12/15 given worsening R frontal IPH, given Andexxa for reversal of NOAC, and subsequently downgraded on 12/17. Transferred to MICU on 12/21 after developing altered mentation with vomiting and aspiration of dark liquid stomach content. Subsequently developed septic shock state requiring dual IV vasopressor support.    Events last 24 hours: ***    PAST MEDICAL & SURGICAL HISTORY:  HTN  dx 2008      Bladder Cancer (ICD9 188.9)  TCC, dx 1999      Hyperlipemia (ICD9 272.4)  dx 2008      Lt Renal Neoplasm  left - s/p left nephrectomy      Hx antineoplastic chemotherapy (BCG 2012)      Left bundle branch block      Vertigo      Heel spur, left      Abdominal aortic aneurysm (AAA)  5.5 cm      BPH (benign prostatic hyperplasia)      Renal mass, right  current - following with Dr Pamela SHEARER (coronary artery disease)      Bladder cancer, primary, with metastasis from bladder to other site  Left kidney      Acute renal failure, unspecified acute renal failure type      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)      Abdominal aortic aneurysm (AAA) without rupture      Congestive heart failure, unspecified chronicity, unspecified heart failure type      Hypertension      History of abdominal aortic aneurysm (AAA)      CAD (coronary artery disease)      Alzheimers disease      urethral Stent 7/2008  stent placement and removal      S/P Cystoscopy  bladder polyps removal multiple times (since 1999), 6/10 , 10/2011 & 10/17/2011, 5/12, 11/2012, last 5/13/13, 12/2013, 7/2014      S/P Tonsillectomy      History of Lt  Nephrectomy  6/10 - left      History of cystoscopy  April 2015      H/O abdominal aortic aneurysm repair      S/P AAA repair      History of nephrectomy  Left      Presence of stent in LAD coronary artery      History of heart artery stent  DIONICIO          Review of Systems:  CONSTITUTIONAL: No fever, chills, or fatigue  EYES: No eye pain, visual disturbances, or discharge  ENMT:  No difficulty hearing, tinnitus, vertigo; No sinus or throat pain  NECK: No pain or stiffness  RESPIRATORY: No cough, wheezing, chills or hemoptysis; No shortness of breath  CARDIOVASCULAR: No chest pain, palpitations, dizziness, or leg swelling  GASTROINTESTINAL: No abdominal or epigastric pain. No nausea, vomiting, or hematemesis; No diarrhea or constipation. No melena or hematochezia.  GENITOURINARY: No dysuria, frequency, hematuria, or incontinence  NEUROLOGICAL: No headaches, memory loss, loss of strength, numbness, or tremors  SKIN: No itching, burning, rashes, or lesions   MUSCULOSKELETAL: No joint pain or swelling; No muscle, back, or extremity pain  PSYCHIATRIC: No depression, anxiety, mood swings, or difficulty sleeping      Medications:  meropenem Injectable 1000 milliGRAM(s) IV Push every 12 hours    doxazosin 2 milliGRAM(s) Oral at bedtime  metoprolol tartrate Injectable 5 milliGRAM(s) IV Push every 6 hours PRN  phenylephrine    Infusion 3 MICROgram(s)/kG/Min IV Continuous <Continuous>      acetaminophen     Tablet .. 650 milliGRAM(s) Oral every 6 hours PRN  levETIRAcetam   Injectable 1000 milliGRAM(s) IV Push every 12 hours  memantine 5 milliGRAM(s) Oral two times a day  propofol Infusion 10 MICROgram(s)/kG/Min IV Continuous <Continuous>        pantoprazole Infusion 8 mG/Hr IV Continuous <Continuous>  polyethylene glycol 3350 17 Gram(s) Oral daily  senna 2 Tablet(s) Oral at bedtime      atorvastatin 80 milliGRAM(s) Oral at bedtime  hydrocortisone sodium succinate Injectable 50 milliGRAM(s) IV Push every 8 hours  vasopressin Infusion 0.04 Unit(s)/Min IV Continuous <Continuous>    folic acid 1 milliGRAM(s) Oral daily  lactated ringers. 1000 milliLiter(s) IV Continuous <Continuous>  Nephro-roma 1 Tablet(s) Oral daily  potassium chloride  20 mEq/100 mL IVPB 20 milliEquivalent(s) IV Intermittent every 2 hours      artificial tears (preservative free) Ophthalmic Solution 1 Drop(s) Both EYES every 6 hours PRN  chlorhexidine 2% Cloths 1 Application(s) Topical <User Schedule>        Mode: AC/ CMV (Assist Control/ Continuous Mandatory Ventilation)  RR (machine): 14  TV (machine): 450  FiO2: 30  PEEP: 6  ITime: 1  MAP: 9  PIP: 18      ICU Vital Signs Last 24 Hrs  T(C): 37.2 (24 Dec 2023 09:15), Max: 38.2 (23 Dec 2023 14:45)  T(F): 99 (24 Dec 2023 09:15), Max: 100.8 (23 Dec 2023 14:45)  HR: 120 (24 Dec 2023 09:32) (73 - 141)  BP: 100/63 (24 Dec 2023 08:00) (94/59 - 138/52)  BP(mean): 69 (24 Dec 2023 08:00) (69 - 107)  ABP: 116/55 (24 Dec 2023 09:15) (95/48 - 157/41)  ABP(mean): 77 (24 Dec 2023 09:15) (61 - 89)  RR: 12 (24 Dec 2023 09:15) (0 - 30)  SpO2: 100% (24 Dec 2023 09:32) (96% - 100%)    O2 Parameters below as of 23 Dec 2023 19:00  Patient On (Oxygen Delivery Method): ventilator    O2 Concentration (%): 30            I&O's Detail    23 Dec 2023 07:01  -  24 Dec 2023 07:00  --------------------------------------------------------  IN:    Albumin 5% - 50 mL: 50 mL    IV PiggyBack: 350 mL    IV PiggyBack: 100 mL    IV PiggyBack: 200 mL    Lactated Ringers: 1650 mL    Lactated Ringers Bolus: 500 mL    Pantoprazole: 100 mL    Phenylephrine: 254.1 mL    PRBCs (Packed Red Blood Cells): 274 mL    Propofol: 126.5 mL    Vasopressin: 144 mL  Total IN: 3748.6 mL    OUT:    Indwelling Catheter - Urethral (mL): 1550 mL  Total OUT: 1550 mL    Total NET: 2198.6 mL      24 Dec 2023 07:01  -  24 Dec 2023 09:38  --------------------------------------------------------  IN:    Lactated Ringers: 150 mL    Pantoprazole: 20 mL    Phenylephrine: 11 mL    Propofol: 8 mL    Vasopressin: 12 mL  Total IN: 201 mL    OUT:    Indwelling Catheter - Urethral (mL): 45 mL  Total OUT: 45 mL    Total NET: 156 mL            LABS:                        7.4    7.30  )-----------( 125      ( 24 Dec 2023 03:15 )             22.1     12-24    135  |  102  |  43.6<H>  ----------------------------<  128<H>  2.9<LL>   |  20.0<L>  |  1.22    Ca    8.3<L>      24 Dec 2023 03:15  Phos  3.8     12-24  Mg     2.1     12-24    TPro  5.1<L>  /  Alb  2.4<L>  /  TBili  0.4  /  DBili  x   /  AST  56<H>  /  ALT  45<H>  /  AlkPhos  85  12-24          CAPILLARY BLOOD GLUCOSE        PT/INR - ( 24 Dec 2023 03:15 )   PT: 13.0 sec;   INR: 1.18 ratio         PTT - ( 24 Dec 2023 03:15 )  PTT:32.8 sec  Urinalysis Basic - ( 24 Dec 2023 03:15 )    Color: x / Appearance: x / SG: x / pH: x  Gluc: 128 mg/dL / Ketone: x  / Bili: x / Urobili: x   Blood: x / Protein: x / Nitrite: x   Leuk Esterase: x / RBC: x / WBC x   Sq Epi: x / Non Sq Epi: x / Bacteria: x      CULTURES:  Culture Results:   No growth at 24 hours (12-22-23 @ 22:41)  Culture Results:   No growth at 24 hours (12-22-23 @ 22:30)  Culture Results:   Culture is being performed. (12-21-23 @ 19:44)  Culture Results:   No growth (12-21-23 @ 19:44)  Culture Results:   Culture is being performed. Fungal cultures are held for 4 weeks. (12-21-23 @ 19:44)  Culture Results:   Numerous Klebsiella aerogenes (Previously Enterobacter)  Normal Respiratory Isabell present (12-21-23 @ 10:10)  Rapid RVP Result: NotDetec (12-20-23 @ 09:55)  Culture Results:   No growth at 72 Hours (12-20-23 @ 07:32)  Culture Results:   No growth at 72 Hours (12-20-23 @ 07:25)      Physical Examination:    General: No acute distress.  Alert, oriented, interactive, nonfocal, following simple commands and moving all extremities     HEENT: Pupils equal, reactive to light.  Symmetric.    PULM: Breathing comfortabley, good BS B/L with no Rales or Rhonchi, no significant sputum production    CVS: Regular rate and rhythm, no murmurs, rubs, or gallops    ABD: BS+ Soft, nondistended, nontender, no masses    EXT: No edema, nontender    SKIN: Warm and well perfused, no rashes noted.    RADIOLOGY: ***    CRITICAL CARE TIME SPENT: *** mins of time have been spent evaluating, reviewing all available lab and radiologic material, reviewing the EMR and treating this critically ill patient, who has complex medical problems and life threatening organ dysfunction. This also included speaking with the staff, consultants, and family.     Patient is a 83y old  Male who presents with a chief complaint of s/p unwitnessed fall with head strike (24 Dec 2023 08:02)      BRIEF HOSPITAL COURSE: 83M with HTN, CAD s/p PCO, RCC s/p left nephrectomy, bladder CA, BPH, CHF, Vertigo, HLD, AAA s/p EVAR, parox Afib, h/o HIT, Alzheimer's, initially admitted on 11/10 s/p fall with Rt frontal IPH s/p R hemicraniectomy on 11/10 with drain placement. Hospital course complicated by AFib with RVR, AHRF with Klebsiella PNA, echodensity of AV with stroke noted in multiple territories however family declined WHIT, UTI, LUE DVT and questionable increase in AAA size. Started on AC with subsequent altered mentation and repeat CTH revealing new bleed. Pt underwent cranioplasty on 12/7 with subgaleal drain removed 12/10. Re-upgraded to NSICU on 12/15 given worsening R frontal IPH, received Andexxa for reversal of NOAC, and subsequently downgraded on 12/17. Transferred to MICU on 12/21 after developing altered mentation with vomiting and aspiration of dark liquid stomach content. MICU course complicated by dual pressor septic shock and AFib RVR.    Events last 24 hours:   - Received 2U PRBC yesterday with initial inappropriate response, now H/H remains stable. Transitioned protonix gtt to IV BID.  - Recurrent, sustained AFib RVR since yesterday, loaded with digoxin this morning.     PAST MEDICAL & SURGICAL HISTORY:  HTN  dx 2008      Bladder Cancer (ICD9 188.9)  TCC, dx 1999      Hyperlipemia (ICD9 272.4)  dx 2008      Lt Renal Neoplasm  left - s/p left nephrectomy      Hx antineoplastic chemotherapy (BCG 2012)      Left bundle branch block      Vertigo      Heel spur, left      Abdominal aortic aneurysm (AAA)  5.5 cm      BPH (benign prostatic hyperplasia)      Renal mass, right  current - following with Dr Pamela SHEARER (coronary artery disease)      Bladder cancer, primary, with metastasis from bladder to other site  Left kidney      Acute renal failure, unspecified acute renal failure type      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)      Abdominal aortic aneurysm (AAA) without rupture      Congestive heart failure, unspecified chronicity, unspecified heart failure type      Hypertension      History of abdominal aortic aneurysm (AAA)      CAD (coronary artery disease)      Alzheimers disease      urethral Stent 7/2008  stent placement and removal      S/P Cystoscopy  bladder polyps removal multiple times (since 1999), 6/10 , 10/2011 & 10/17/2011, 5/12, 11/2012, last 5/13/13, 12/2013, 7/2014      S/P Tonsillectomy      History of Lt  Nephrectomy  6/10 - left      History of cystoscopy  April 2015      H/O abdominal aortic aneurysm repair      S/P AAA repair      History of nephrectomy  Left      Presence of stent in LAD coronary artery      History of heart artery stent  DIONICIO          Review of Systems:  CONSTITUTIONAL: No fever, chills, or fatigue  EYES: No eye pain, visual disturbances, or discharge  ENMT:  No difficulty hearing, tinnitus, vertigo; No sinus or throat pain  NECK: No pain or stiffness  RESPIRATORY: No cough, wheezing, chills or hemoptysis; No shortness of breath  CARDIOVASCULAR: No chest pain, palpitations, dizziness, or leg swelling  GASTROINTESTINAL: No abdominal or epigastric pain. No nausea, vomiting, or hematemesis; No diarrhea or constipation. No melena or hematochezia.  GENITOURINARY: No dysuria, frequency, hematuria, or incontinence  NEUROLOGICAL: No headaches, memory loss, loss of strength, numbness, or tremors  SKIN: No itching, burning, rashes, or lesions   MUSCULOSKELETAL: No joint pain or swelling; No muscle, back, or extremity pain  PSYCHIATRIC: No depression, anxiety, mood swings, or difficulty sleeping      Medications:  meropenem Injectable 1000 milliGRAM(s) IV Push every 12 hours    doxazosin 2 milliGRAM(s) Oral at bedtime  metoprolol tartrate Injectable 5 milliGRAM(s) IV Push every 6 hours PRN  phenylephrine    Infusion 3 MICROgram(s)/kG/Min IV Continuous <Continuous>      acetaminophen     Tablet .. 650 milliGRAM(s) Oral every 6 hours PRN  levETIRAcetam   Injectable 1000 milliGRAM(s) IV Push every 12 hours  memantine 5 milliGRAM(s) Oral two times a day  propofol Infusion 10 MICROgram(s)/kG/Min IV Continuous <Continuous>        pantoprazole Infusion 8 mG/Hr IV Continuous <Continuous>  polyethylene glycol 3350 17 Gram(s) Oral daily  senna 2 Tablet(s) Oral at bedtime      atorvastatin 80 milliGRAM(s) Oral at bedtime  hydrocortisone sodium succinate Injectable 50 milliGRAM(s) IV Push every 8 hours  vasopressin Infusion 0.04 Unit(s)/Min IV Continuous <Continuous>    folic acid 1 milliGRAM(s) Oral daily  lactated ringers. 1000 milliLiter(s) IV Continuous <Continuous>  Nephro-roma 1 Tablet(s) Oral daily  potassium chloride  20 mEq/100 mL IVPB 20 milliEquivalent(s) IV Intermittent every 2 hours      artificial tears (preservative free) Ophthalmic Solution 1 Drop(s) Both EYES every 6 hours PRN  chlorhexidine 2% Cloths 1 Application(s) Topical <User Schedule>        Mode: AC/ CMV (Assist Control/ Continuous Mandatory Ventilation)  RR (machine): 14  TV (machine): 450  FiO2: 30  PEEP: 6  ITime: 1  MAP: 9  PIP: 18      ICU Vital Signs Last 24 Hrs  T(C): 37.2 (24 Dec 2023 09:15), Max: 38.2 (23 Dec 2023 14:45)  T(F): 99 (24 Dec 2023 09:15), Max: 100.8 (23 Dec 2023 14:45)  HR: 120 (24 Dec 2023 09:32) (73 - 141)  BP: 100/63 (24 Dec 2023 08:00) (94/59 - 138/52)  BP(mean): 69 (24 Dec 2023 08:00) (69 - 107)  ABP: 116/55 (24 Dec 2023 09:15) (95/48 - 157/41)  ABP(mean): 77 (24 Dec 2023 09:15) (61 - 89)  RR: 12 (24 Dec 2023 09:15) (0 - 30)  SpO2: 100% (24 Dec 2023 09:32) (96% - 100%)    O2 Parameters below as of 23 Dec 2023 19:00  Patient On (Oxygen Delivery Method): ventilator    O2 Concentration (%): 30            I&O's Detail    23 Dec 2023 07:01  -  24 Dec 2023 07:00  --------------------------------------------------------  IN:    Albumin 5% - 50 mL: 50 mL    IV PiggyBack: 350 mL    IV PiggyBack: 100 mL    IV PiggyBack: 200 mL    Lactated Ringers: 1650 mL    Lactated Ringers Bolus: 500 mL    Pantoprazole: 100 mL    Phenylephrine: 254.1 mL    PRBCs (Packed Red Blood Cells): 274 mL    Propofol: 126.5 mL    Vasopressin: 144 mL  Total IN: 3748.6 mL    OUT:    Indwelling Catheter - Urethral (mL): 1550 mL  Total OUT: 1550 mL    Total NET: 2198.6 mL      24 Dec 2023 07:01  -  24 Dec 2023 09:38  --------------------------------------------------------  IN:    Lactated Ringers: 150 mL    Pantoprazole: 20 mL    Phenylephrine: 11 mL    Propofol: 8 mL    Vasopressin: 12 mL  Total IN: 201 mL    OUT:    Indwelling Catheter - Urethral (mL): 45 mL  Total OUT: 45 mL    Total NET: 156 mL            LABS:                        7.4    7.30  )-----------( 125      ( 24 Dec 2023 03:15 )             22.1     12-24    135  |  102  |  43.6<H>  ----------------------------<  128<H>  2.9<LL>   |  20.0<L>  |  1.22    Ca    8.3<L>      24 Dec 2023 03:15  Phos  3.8     12-24  Mg     2.1     12-24    TPro  5.1<L>  /  Alb  2.4<L>  /  TBili  0.4  /  DBili  x   /  AST  56<H>  /  ALT  45<H>  /  AlkPhos  85  12-24          CAPILLARY BLOOD GLUCOSE        PT/INR - ( 24 Dec 2023 03:15 )   PT: 13.0 sec;   INR: 1.18 ratio         PTT - ( 24 Dec 2023 03:15 )  PTT:32.8 sec  Urinalysis Basic - ( 24 Dec 2023 03:15 )    Color: x / Appearance: x / SG: x / pH: x  Gluc: 128 mg/dL / Ketone: x  / Bili: x / Urobili: x   Blood: x / Protein: x / Nitrite: x   Leuk Esterase: x / RBC: x / WBC x   Sq Epi: x / Non Sq Epi: x / Bacteria: x      CULTURES:  Culture Results:   No growth at 24 hours (12-22-23 @ 22:41)  Culture Results:   No growth at 24 hours (12-22-23 @ 22:30)  Culture Results:   Culture is being performed. (12-21-23 @ 19:44)  Culture Results:   No growth (12-21-23 @ 19:44)  Culture Results:   Culture is being performed. Fungal cultures are held for 4 weeks. (12-21-23 @ 19:44)  Culture Results:   Numerous Klebsiella aerogenes (Previously Enterobacter)  Normal Respiratory Isabell present (12-21-23 @ 10:10)  Rapid RVP Result: NotDetec (12-20-23 @ 09:55)  Culture Results:   No growth at 72 Hours (12-20-23 @ 07:32)  Culture Results:   No growth at 72 Hours (12-20-23 @ 07:25)      Physical Examination:    General: No acute distress.  Alert, oriented, interactive, nonfocal, following simple commands and moving all extremities     HEENT: Pupils equal, reactive to light.  Symmetric.    PULM: Breathing comfortabley, good BS B/L with no Rales or Rhonchi, no significant sputum production    CVS: Regular rate and rhythm, no murmurs, rubs, or gallops    ABD: BS+ Soft, nondistended, nontender, no masses    EXT: No edema, nontender    SKIN: Warm and well perfused, no rashes noted.    RADIOLOGY: ***    CRITICAL CARE TIME SPENT: *** mins of time have been spent evaluating, reviewing all available lab and radiologic material, reviewing the EMR and treating this critically ill patient, who has complex medical problems and life threatening organ dysfunction. This also included speaking with the staff, consultants, and family.     Patient is a 83y old  Male who presents with a chief complaint of s/p unwitnessed fall with head strike (24 Dec 2023 08:02)      BRIEF HOSPITAL COURSE: 83M with HTN, CAD s/p PCO, RCC s/p left nephrectomy, bladder CA, BPH, CHF, Vertigo, HLD, AAA s/p EVAR, parox Afib, h/o HIT, Alzheimer's, initially admitted on 11/10 s/p fall with Rt frontal IPH s/p R hemicraniectomy on 11/10 with drain placement. Hospital course complicated by AFib with RVR, AHRF with Klebsiella PNA, echodensity of AV with stroke noted in multiple territories however family declined WHIT, UTI, LUE DVT and questionable increase in AAA size. Started on AC with subsequent altered mentation and repeat CTH revealing new bleed. Pt underwent cranioplasty on 12/7 with subgaleal drain removed 12/10. Re-upgraded to NSICU on 12/15 given worsening R frontal IPH, received Andexxa for reversal of NOAC, and subsequently downgraded on 12/17. Transferred to MICU on 12/21 after developing altered mentation with vomiting and aspiration of dark liquid stomach content. MICU course complicated by dual pressor septic shock and AFib RVR.    Events last 24 hours:   - Received 2U PRBC yesterday with initial inappropriate response, now H/H remains stable. Transitioned protonix gtt to IV BID.  - Recurrent, sustained AFib RVR since yesterday, loaded with digoxin this morning.   - MRSA PCR negative, CSF PCR remains negative, Vancomycin discontinued, only growing Klebsiella in sputum covered with ongoing Meropenem.    PAST MEDICAL & SURGICAL HISTORY:  HTN  dx 2008      Bladder Cancer (ICD9 188.9)  TCC, dx 1999      Hyperlipemia (ICD9 272.4)  dx 2008      Lt Renal Neoplasm  left - s/p left nephrectomy      Hx antineoplastic chemotherapy (BCG 2012)      Left bundle branch block      Vertigo      Heel spur, left      Abdominal aortic aneurysm (AAA)  5.5 cm      BPH (benign prostatic hyperplasia)      Renal mass, right  current - following with Dr Pamela SHEARER (coronary artery disease)      Bladder cancer, primary, with metastasis from bladder to other site  Left kidney      Acute renal failure, unspecified acute renal failure type      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)      Abdominal aortic aneurysm (AAA) without rupture      Congestive heart failure, unspecified chronicity, unspecified heart failure type      Hypertension      History of abdominal aortic aneurysm (AAA)      CAD (coronary artery disease)      Alzheimers disease      urethral Stent 7/2008  stent placement and removal      S/P Cystoscopy  bladder polyps removal multiple times (since 1999), 6/10 , 10/2011 & 10/17/2011, 5/12, 11/2012, last 5/13/13, 12/2013, 7/2014      S/P Tonsillectomy      History of Lt  Nephrectomy  6/10 - left      History of cystoscopy  April 2015      H/O abdominal aortic aneurysm repair      S/P AAA repair      History of nephrectomy  Left      Presence of stent in LAD coronary artery      History of heart artery stent  DIONICIO          Review of Systems:  CONSTITUTIONAL: No fever, chills, or fatigue  EYES: No eye pain, visual disturbances, or discharge  ENMT:  No difficulty hearing, tinnitus, vertigo; No sinus or throat pain  NECK: No pain or stiffness  RESPIRATORY: No cough, wheezing, chills or hemoptysis; No shortness of breath  CARDIOVASCULAR: No chest pain, palpitations, dizziness, or leg swelling  GASTROINTESTINAL: No abdominal or epigastric pain. No nausea, vomiting, or hematemesis; No diarrhea or constipation. No melena or hematochezia.  GENITOURINARY: No dysuria, frequency, hematuria, or incontinence  NEUROLOGICAL: No headaches, memory loss, loss of strength, numbness, or tremors  SKIN: No itching, burning, rashes, or lesions   MUSCULOSKELETAL: No joint pain or swelling; No muscle, back, or extremity pain  PSYCHIATRIC: No depression, anxiety, mood swings, or difficulty sleeping      Medications:  meropenem Injectable 1000 milliGRAM(s) IV Push every 12 hours    doxazosin 2 milliGRAM(s) Oral at bedtime  metoprolol tartrate Injectable 5 milliGRAM(s) IV Push every 6 hours PRN  phenylephrine    Infusion 3 MICROgram(s)/kG/Min IV Continuous <Continuous>      acetaminophen     Tablet .. 650 milliGRAM(s) Oral every 6 hours PRN  levETIRAcetam   Injectable 1000 milliGRAM(s) IV Push every 12 hours  memantine 5 milliGRAM(s) Oral two times a day  propofol Infusion 10 MICROgram(s)/kG/Min IV Continuous <Continuous>        pantoprazole Infusion 8 mG/Hr IV Continuous <Continuous>  polyethylene glycol 3350 17 Gram(s) Oral daily  senna 2 Tablet(s) Oral at bedtime      atorvastatin 80 milliGRAM(s) Oral at bedtime  hydrocortisone sodium succinate Injectable 50 milliGRAM(s) IV Push every 8 hours  vasopressin Infusion 0.04 Unit(s)/Min IV Continuous <Continuous>    folic acid 1 milliGRAM(s) Oral daily  lactated ringers. 1000 milliLiter(s) IV Continuous <Continuous>  Nephro-roma 1 Tablet(s) Oral daily  potassium chloride  20 mEq/100 mL IVPB 20 milliEquivalent(s) IV Intermittent every 2 hours      artificial tears (preservative free) Ophthalmic Solution 1 Drop(s) Both EYES every 6 hours PRN  chlorhexidine 2% Cloths 1 Application(s) Topical <User Schedule>        Mode: AC/ CMV (Assist Control/ Continuous Mandatory Ventilation)  RR (machine): 14  TV (machine): 450  FiO2: 30  PEEP: 6  ITime: 1  MAP: 9  PIP: 18      ICU Vital Signs Last 24 Hrs  T(C): 37.2 (24 Dec 2023 09:15), Max: 38.2 (23 Dec 2023 14:45)  T(F): 99 (24 Dec 2023 09:15), Max: 100.8 (23 Dec 2023 14:45)  HR: 120 (24 Dec 2023 09:32) (73 - 141)  BP: 100/63 (24 Dec 2023 08:00) (94/59 - 138/52)  BP(mean): 69 (24 Dec 2023 08:00) (69 - 107)  ABP: 116/55 (24 Dec 2023 09:15) (95/48 - 157/41)  ABP(mean): 77 (24 Dec 2023 09:15) (61 - 89)  RR: 12 (24 Dec 2023 09:15) (0 - 30)  SpO2: 100% (24 Dec 2023 09:32) (96% - 100%)    O2 Parameters below as of 23 Dec 2023 19:00  Patient On (Oxygen Delivery Method): ventilator    O2 Concentration (%): 30            I&O's Detail    23 Dec 2023 07:01  -  24 Dec 2023 07:00  --------------------------------------------------------  IN:    Albumin 5% - 50 mL: 50 mL    IV PiggyBack: 350 mL    IV PiggyBack: 100 mL    IV PiggyBack: 200 mL    Lactated Ringers: 1650 mL    Lactated Ringers Bolus: 500 mL    Pantoprazole: 100 mL    Phenylephrine: 254.1 mL    PRBCs (Packed Red Blood Cells): 274 mL    Propofol: 126.5 mL    Vasopressin: 144 mL  Total IN: 3748.6 mL    OUT:    Indwelling Catheter - Urethral (mL): 1550 mL  Total OUT: 1550 mL    Total NET: 2198.6 mL      24 Dec 2023 07:01  -  24 Dec 2023 09:38  --------------------------------------------------------  IN:    Lactated Ringers: 150 mL    Pantoprazole: 20 mL    Phenylephrine: 11 mL    Propofol: 8 mL    Vasopressin: 12 mL  Total IN: 201 mL    OUT:    Indwelling Catheter - Urethral (mL): 45 mL  Total OUT: 45 mL    Total NET: 156 mL            LABS:                        7.4    7.30  )-----------( 125      ( 24 Dec 2023 03:15 )             22.1     12-24    135  |  102  |  43.6<H>  ----------------------------<  128<H>  2.9<LL>   |  20.0<L>  |  1.22    Ca    8.3<L>      24 Dec 2023 03:15  Phos  3.8     12-24  Mg     2.1     12-24    TPro  5.1<L>  /  Alb  2.4<L>  /  TBili  0.4  /  DBili  x   /  AST  56<H>  /  ALT  45<H>  /  AlkPhos  85  12-24          CAPILLARY BLOOD GLUCOSE        PT/INR - ( 24 Dec 2023 03:15 )   PT: 13.0 sec;   INR: 1.18 ratio         PTT - ( 24 Dec 2023 03:15 )  PTT:32.8 sec  Urinalysis Basic - ( 24 Dec 2023 03:15 )    Color: x / Appearance: x / SG: x / pH: x  Gluc: 128 mg/dL / Ketone: x  / Bili: x / Urobili: x   Blood: x / Protein: x / Nitrite: x   Leuk Esterase: x / RBC: x / WBC x   Sq Epi: x / Non Sq Epi: x / Bacteria: x      CULTURES:  Culture Results:   No growth at 24 hours (12-22-23 @ 22:41)  Culture Results:   No growth at 24 hours (12-22-23 @ 22:30)  Culture Results:   Culture is being performed. (12-21-23 @ 19:44)  Culture Results:   No growth (12-21-23 @ 19:44)  Culture Results:   Culture is being performed. Fungal cultures are held for 4 weeks. (12-21-23 @ 19:44)  Culture Results:   Numerous Klebsiella aerogenes (Previously Enterobacter)  Normal Respiratory Isabell present (12-21-23 @ 10:10)  Rapid RVP Result: NotDetec (12-20-23 @ 09:55)  Culture Results:   No growth at 72 Hours (12-20-23 @ 07:32)  Culture Results:   No growth at 72 Hours (12-20-23 @ 07:25)      Physical Examination:    General: No acute distress.  Alert, oriented, interactive, nonfocal, following simple commands and moving all extremities     HEENT: Pupils equal, reactive to light.  Symmetric.    PULM: Breathing comfortabley, good BS B/L with no Rales or Rhonchi, no significant sputum production    CVS: Regular rate and rhythm, no murmurs, rubs, or gallops    ABD: BS+ Soft, nondistended, nontender, no masses    EXT: No edema, nontender    SKIN: Warm and well perfused, no rashes noted.    RADIOLOGY: ***    CRITICAL CARE TIME SPENT: *** mins of time have been spent evaluating, reviewing all available lab and radiologic material, reviewing the EMR and treating this critically ill patient, who has complex medical problems and life threatening organ dysfunction. This also included speaking with the staff, consultants, and family.     Patient is a 83y old  Male who presents with a chief complaint of s/p unwitnessed fall with head strike (24 Dec 2023 08:02)      BRIEF HOSPITAL COURSE: 83M with HTN, CAD s/p PCO, RCC s/p left nephrectomy, bladder CA, BPH, CHF, Vertigo, HLD, AAA s/p EVAR, parox Afib, h/o HIT, Alzheimer's, initially admitted on 11/10 s/p fall with Rt frontal IPH s/p R hemicraniectomy on 11/10 with drain placement. Hospital course complicated by AFib with RVR, AHRF with Klebsiella PNA, echodensity of AV with stroke noted in multiple territories however family declined WHIT, UTI, LUE DVT and questionable increase in AAA size. Started on AC with subsequent altered mentation and repeat CTH revealing new bleed. Pt underwent cranioplasty on 12/7 with subgaleal drain removed 12/10. Re-upgraded to NSICU on 12/15 given worsening R frontal IPH, received Andexxa for reversal of NOAC, and subsequently downgraded on 12/17. Transferred to MICU on 12/21 after developing altered mentation with vomiting and aspiration of dark liquid stomach content. MICU course complicated by dual pressor septic shock and AFib RVR.    Events last 24 hours:   - Received 2U PRBC yesterday with initial inappropriate response, now H/H remains stable. Transitioned protonix gtt to IV BID.  - Recurrent, sustained AFib RVR since yesterday, loaded with digoxin this morning.   - MRSA PCR negative, CSF PCR remains negative, Vancomycin discontinued, only growing Klebsiella in sputum covered with ongoing Meropenem.    PAST MEDICAL & SURGICAL HISTORY:  HTN  dx 2008      Bladder Cancer (ICD9 188.9)  TCC, dx 1999      Hyperlipemia (ICD9 272.4)  dx 2008      Lt Renal Neoplasm  left - s/p left nephrectomy      Hx antineoplastic chemotherapy (BCG 2012)      Left bundle branch block      Vertigo      Heel spur, left      Abdominal aortic aneurysm (AAA)  5.5 cm      BPH (benign prostatic hyperplasia)      Renal mass, right  current - following with Dr Pamela SHEARER (coronary artery disease)      Bladder cancer, primary, with metastasis from bladder to other site  Left kidney      Acute renal failure, unspecified acute renal failure type      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)      Abdominal aortic aneurysm (AAA) without rupture      Congestive heart failure, unspecified chronicity, unspecified heart failure type      Hypertension      History of abdominal aortic aneurysm (AAA)      CAD (coronary artery disease)      Alzheimers disease      urethral Stent 7/2008  stent placement and removal      S/P Cystoscopy  bladder polyps removal multiple times (since 1999), 6/10 , 10/2011 & 10/17/2011, 5/12, 11/2012, last 5/13/13, 12/2013, 7/2014      S/P Tonsillectomy      History of Lt  Nephrectomy  6/10 - left      History of cystoscopy  April 2015      H/O abdominal aortic aneurysm repair      S/P AAA repair      History of nephrectomy  Left      Presence of stent in LAD coronary artery      History of heart artery stent  DIONICIO          Review of Systems: Unable to obtain, intubated and sedated      Medications:  meropenem Injectable 1000 milliGRAM(s) IV Push every 12 hours    doxazosin 2 milliGRAM(s) Oral at bedtime  metoprolol tartrate Injectable 5 milliGRAM(s) IV Push every 6 hours PRN  phenylephrine    Infusion 3 MICROgram(s)/kG/Min IV Continuous <Continuous>      acetaminophen     Tablet .. 650 milliGRAM(s) Oral every 6 hours PRN  levETIRAcetam   Injectable 1000 milliGRAM(s) IV Push every 12 hours  memantine 5 milliGRAM(s) Oral two times a day  propofol Infusion 10 MICROgram(s)/kG/Min IV Continuous <Continuous>        pantoprazole Infusion 8 mG/Hr IV Continuous <Continuous>  polyethylene glycol 3350 17 Gram(s) Oral daily  senna 2 Tablet(s) Oral at bedtime      atorvastatin 80 milliGRAM(s) Oral at bedtime  hydrocortisone sodium succinate Injectable 50 milliGRAM(s) IV Push every 8 hours  vasopressin Infusion 0.04 Unit(s)/Min IV Continuous <Continuous>    folic acid 1 milliGRAM(s) Oral daily  lactated ringers. 1000 milliLiter(s) IV Continuous <Continuous>  Nephro-roma 1 Tablet(s) Oral daily  potassium chloride  20 mEq/100 mL IVPB 20 milliEquivalent(s) IV Intermittent every 2 hours      artificial tears (preservative free) Ophthalmic Solution 1 Drop(s) Both EYES every 6 hours PRN  chlorhexidine 2% Cloths 1 Application(s) Topical <User Schedule>        Mode: AC/ CMV (Assist Control/ Continuous Mandatory Ventilation)  RR (machine): 14  TV (machine): 450  FiO2: 30  PEEP: 6  ITime: 1  MAP: 9  PIP: 18      ICU Vital Signs Last 24 Hrs  T(C): 37.2 (24 Dec 2023 09:15), Max: 38.2 (23 Dec 2023 14:45)  T(F): 99 (24 Dec 2023 09:15), Max: 100.8 (23 Dec 2023 14:45)  HR: 120 (24 Dec 2023 09:32) (73 - 141)  BP: 100/63 (24 Dec 2023 08:00) (94/59 - 138/52)  BP(mean): 69 (24 Dec 2023 08:00) (69 - 107)  ABP: 116/55 (24 Dec 2023 09:15) (95/48 - 157/41)  ABP(mean): 77 (24 Dec 2023 09:15) (61 - 89)  RR: 12 (24 Dec 2023 09:15) (0 - 30)  SpO2: 100% (24 Dec 2023 09:32) (96% - 100%)    O2 Parameters below as of 23 Dec 2023 19:00  Patient On (Oxygen Delivery Method): ventilator    O2 Concentration (%): 30            I&O's Detail    23 Dec 2023 07:01  -  24 Dec 2023 07:00  --------------------------------------------------------  IN:    Albumin 5% - 50 mL: 50 mL    IV PiggyBack: 350 mL    IV PiggyBack: 100 mL    IV PiggyBack: 200 mL    Lactated Ringers: 1650 mL    Lactated Ringers Bolus: 500 mL    Pantoprazole: 100 mL    Phenylephrine: 254.1 mL    PRBCs (Packed Red Blood Cells): 274 mL    Propofol: 126.5 mL    Vasopressin: 144 mL  Total IN: 3748.6 mL    OUT:    Indwelling Catheter - Urethral (mL): 1550 mL  Total OUT: 1550 mL    Total NET: 2198.6 mL      24 Dec 2023 07:01  -  24 Dec 2023 09:38  --------------------------------------------------------  IN:    Lactated Ringers: 150 mL    Pantoprazole: 20 mL    Phenylephrine: 11 mL    Propofol: 8 mL    Vasopressin: 12 mL  Total IN: 201 mL    OUT:    Indwelling Catheter - Urethral (mL): 45 mL  Total OUT: 45 mL    Total NET: 156 mL            LABS:                        7.4    7.30  )-----------( 125      ( 24 Dec 2023 03:15 )             22.1     12-24    135  |  102  |  43.6<H>  ----------------------------<  128<H>  2.9<LL>   |  20.0<L>  |  1.22    Ca    8.3<L>      24 Dec 2023 03:15  Phos  3.8     12-24  Mg     2.1     12-24    TPro  5.1<L>  /  Alb  2.4<L>  /  TBili  0.4  /  DBili  x   /  AST  56<H>  /  ALT  45<H>  /  AlkPhos  85  12-24          CAPILLARY BLOOD GLUCOSE        PT/INR - ( 24 Dec 2023 03:15 )   PT: 13.0 sec;   INR: 1.18 ratio         PTT - ( 24 Dec 2023 03:15 )  PTT:32.8 sec  Urinalysis Basic - ( 24 Dec 2023 03:15 )    Color: x / Appearance: x / SG: x / pH: x  Gluc: 128 mg/dL / Ketone: x  / Bili: x / Urobili: x   Blood: x / Protein: x / Nitrite: x   Leuk Esterase: x / RBC: x / WBC x   Sq Epi: x / Non Sq Epi: x / Bacteria: x      CULTURES:  Culture Results:   No growth at 24 hours (12-22-23 @ 22:41)  Culture Results:   No growth at 24 hours (12-22-23 @ 22:30)  Culture Results:   Culture is being performed. (12-21-23 @ 19:44)  Culture Results:   No growth (12-21-23 @ 19:44)  Culture Results:   Culture is being performed. Fungal cultures are held for 4 weeks. (12-21-23 @ 19:44)  Culture Results:   Numerous Klebsiella aerogenes (Previously Enterobacter)  Normal Respiratory Isabell present (12-21-23 @ 10:10)  Rapid RVP Result: NotDetec (12-20-23 @ 09:55)  Culture Results:   No growth at 72 Hours (12-20-23 @ 07:32)  Culture Results:   No growth at 72 Hours (12-20-23 @ 07:25)      Physical Examination:    General: Intubated and sedated.     HEENT: Pupils sluggish b/l. Symmetric    PULM: Clear to auscultation bilaterally, no significant sputum production    CVS: Irregular/irregular, tachycardic    ABD: Soft, nondistended, nontender, normoactive bowel sounds, no masses    EXT: No edema    SKIN: Warm and well perfused, no rashes noted.      RADIOLOGY:   < from: CT Head No Cont (12.22.23 @ 03:51) >  IMPRESSION:  Interval decrease in size of the simple density fluid collection, likely   either a pseudomeningocele or seroma, superficial to the right-sided   cranioplasty.  No other significant change.      < end of copied text >      CRITICAL CARE TIME SPENT: 65 mins of time have been spent evaluating, reviewing all available lab and radiologic material, reviewing the EMR and treating this critically ill patient, who has complex medical problems and life threatening organ dysfunction. This also included speaking with the staff, consultants, and family. Date of entry of this note is equal to the date of services rendered.

## 2023-12-25 LAB
-  CANDIDA GLABRATA: SIGNIFICANT CHANGE UP
-  CANDIDA GLABRATA: SIGNIFICANT CHANGE UP
ACANTHOCYTES BLD QL SMEAR: SLIGHT — SIGNIFICANT CHANGE UP
ACANTHOCYTES BLD QL SMEAR: SLIGHT — SIGNIFICANT CHANGE UP
ALBUMIN SERPL ELPH-MCNC: 2.2 G/DL — LOW (ref 3.3–5.2)
ALBUMIN SERPL ELPH-MCNC: 2.2 G/DL — LOW (ref 3.3–5.2)
ALP SERPL-CCNC: 87 U/L — SIGNIFICANT CHANGE UP (ref 40–120)
ALP SERPL-CCNC: 87 U/L — SIGNIFICANT CHANGE UP (ref 40–120)
ALT FLD-CCNC: 41 U/L — HIGH
ALT FLD-CCNC: 41 U/L — HIGH
ANION GAP SERPL CALC-SCNC: 12 MMOL/L — SIGNIFICANT CHANGE UP (ref 5–17)
ANION GAP SERPL CALC-SCNC: 12 MMOL/L — SIGNIFICANT CHANGE UP (ref 5–17)
ANION GAP SERPL CALC-SCNC: 13 MMOL/L — SIGNIFICANT CHANGE UP (ref 5–17)
ANION GAP SERPL CALC-SCNC: 13 MMOL/L — SIGNIFICANT CHANGE UP (ref 5–17)
AST SERPL-CCNC: 48 U/L — HIGH
AST SERPL-CCNC: 48 U/L — HIGH
BASOPHILS # BLD AUTO: 0 K/UL — SIGNIFICANT CHANGE UP (ref 0–0.2)
BASOPHILS # BLD AUTO: 0 K/UL — SIGNIFICANT CHANGE UP (ref 0–0.2)
BASOPHILS NFR BLD AUTO: 0 % — SIGNIFICANT CHANGE UP (ref 0–2)
BASOPHILS NFR BLD AUTO: 0 % — SIGNIFICANT CHANGE UP (ref 0–2)
BILIRUB SERPL-MCNC: 0.3 MG/DL — LOW (ref 0.4–2)
BILIRUB SERPL-MCNC: 0.3 MG/DL — LOW (ref 0.4–2)
BUN SERPL-MCNC: 36.5 MG/DL — HIGH (ref 8–20)
BUN SERPL-MCNC: 36.5 MG/DL — HIGH (ref 8–20)
BUN SERPL-MCNC: 37.7 MG/DL — HIGH (ref 8–20)
BUN SERPL-MCNC: 37.7 MG/DL — HIGH (ref 8–20)
BURR CELLS BLD QL SMEAR: PRESENT — SIGNIFICANT CHANGE UP
BURR CELLS BLD QL SMEAR: PRESENT — SIGNIFICANT CHANGE UP
CALCIUM SERPL-MCNC: 8.6 MG/DL — SIGNIFICANT CHANGE UP (ref 8.4–10.5)
CHLORIDE SERPL-SCNC: 108 MMOL/L — SIGNIFICANT CHANGE UP (ref 96–108)
CHLORIDE SERPL-SCNC: 108 MMOL/L — SIGNIFICANT CHANGE UP (ref 96–108)
CHLORIDE SERPL-SCNC: 109 MMOL/L — HIGH (ref 96–108)
CHLORIDE SERPL-SCNC: 109 MMOL/L — HIGH (ref 96–108)
CO2 SERPL-SCNC: 19 MMOL/L — LOW (ref 22–29)
CO2 SERPL-SCNC: 19 MMOL/L — LOW (ref 22–29)
CO2 SERPL-SCNC: 20 MMOL/L — LOW (ref 22–29)
CO2 SERPL-SCNC: 20 MMOL/L — LOW (ref 22–29)
CREAT SERPL-MCNC: 1.06 MG/DL — SIGNIFICANT CHANGE UP (ref 0.5–1.3)
CREAT SERPL-MCNC: 1.06 MG/DL — SIGNIFICANT CHANGE UP (ref 0.5–1.3)
CREAT SERPL-MCNC: 1.1 MG/DL — SIGNIFICANT CHANGE UP (ref 0.5–1.3)
CREAT SERPL-MCNC: 1.1 MG/DL — SIGNIFICANT CHANGE UP (ref 0.5–1.3)
CULTURE RESULTS: SIGNIFICANT CHANGE UP
DIGOXIN SERPL-MCNC: 0.5 NG/ML — LOW (ref 0.8–2)
DIGOXIN SERPL-MCNC: 0.5 NG/ML — LOW (ref 0.8–2)
EGFR: 67 ML/MIN/1.73M2 — SIGNIFICANT CHANGE UP
EGFR: 67 ML/MIN/1.73M2 — SIGNIFICANT CHANGE UP
EGFR: 70 ML/MIN/1.73M2 — SIGNIFICANT CHANGE UP
EGFR: 70 ML/MIN/1.73M2 — SIGNIFICANT CHANGE UP
ELLIPTOCYTES BLD QL SMEAR: SLIGHT — SIGNIFICANT CHANGE UP
ELLIPTOCYTES BLD QL SMEAR: SLIGHT — SIGNIFICANT CHANGE UP
EOSINOPHIL # BLD AUTO: 0 K/UL — SIGNIFICANT CHANGE UP (ref 0–0.5)
EOSINOPHIL # BLD AUTO: 0 K/UL — SIGNIFICANT CHANGE UP (ref 0–0.5)
EOSINOPHIL NFR BLD AUTO: 0 % — SIGNIFICANT CHANGE UP (ref 0–6)
EOSINOPHIL NFR BLD AUTO: 0 % — SIGNIFICANT CHANGE UP (ref 0–6)
GIANT PLATELETS BLD QL SMEAR: PRESENT — SIGNIFICANT CHANGE UP
GIANT PLATELETS BLD QL SMEAR: PRESENT — SIGNIFICANT CHANGE UP
GLUCOSE SERPL-MCNC: 130 MG/DL — HIGH (ref 70–99)
GLUCOSE SERPL-MCNC: 130 MG/DL — HIGH (ref 70–99)
GLUCOSE SERPL-MCNC: 131 MG/DL — HIGH (ref 70–99)
GLUCOSE SERPL-MCNC: 131 MG/DL — HIGH (ref 70–99)
GRAM STN FLD: ABNORMAL
HCT VFR BLD CALC: 23.5 % — LOW (ref 39–50)
HCT VFR BLD CALC: 23.5 % — LOW (ref 39–50)
HGB BLD-MCNC: 7.7 G/DL — LOW (ref 13–17)
HGB BLD-MCNC: 7.7 G/DL — LOW (ref 13–17)
LYMPHOCYTES # BLD AUTO: 0.26 K/UL — LOW (ref 1–3.3)
LYMPHOCYTES # BLD AUTO: 0.26 K/UL — LOW (ref 1–3.3)
LYMPHOCYTES # BLD AUTO: 2.6 % — LOW (ref 13–44)
LYMPHOCYTES # BLD AUTO: 2.6 % — LOW (ref 13–44)
MAGNESIUM SERPL-MCNC: 2.1 MG/DL — SIGNIFICANT CHANGE UP (ref 1.6–2.6)
MAGNESIUM SERPL-MCNC: 2.1 MG/DL — SIGNIFICANT CHANGE UP (ref 1.6–2.6)
MANUAL SMEAR VERIFICATION: SIGNIFICANT CHANGE UP
MANUAL SMEAR VERIFICATION: SIGNIFICANT CHANGE UP
MCHC RBC-ENTMCNC: 30 PG — SIGNIFICANT CHANGE UP (ref 27–34)
MCHC RBC-ENTMCNC: 30 PG — SIGNIFICANT CHANGE UP (ref 27–34)
MCHC RBC-ENTMCNC: 32.8 GM/DL — SIGNIFICANT CHANGE UP (ref 32–36)
MCHC RBC-ENTMCNC: 32.8 GM/DL — SIGNIFICANT CHANGE UP (ref 32–36)
MCV RBC AUTO: 91.4 FL — SIGNIFICANT CHANGE UP (ref 80–100)
MCV RBC AUTO: 91.4 FL — SIGNIFICANT CHANGE UP (ref 80–100)
METHOD TYPE: SIGNIFICANT CHANGE UP
METHOD TYPE: SIGNIFICANT CHANGE UP
MONOCYTES # BLD AUTO: 0.44 K/UL — SIGNIFICANT CHANGE UP (ref 0–0.9)
MONOCYTES # BLD AUTO: 0.44 K/UL — SIGNIFICANT CHANGE UP (ref 0–0.9)
MONOCYTES NFR BLD AUTO: 4.4 % — SIGNIFICANT CHANGE UP (ref 2–14)
MONOCYTES NFR BLD AUTO: 4.4 % — SIGNIFICANT CHANGE UP (ref 2–14)
NEUTROPHILS # BLD AUTO: 9.35 K/UL — HIGH (ref 1.8–7.4)
NEUTROPHILS # BLD AUTO: 9.35 K/UL — HIGH (ref 1.8–7.4)
NEUTROPHILS NFR BLD AUTO: 93 % — HIGH (ref 43–77)
NEUTROPHILS NFR BLD AUTO: 93 % — HIGH (ref 43–77)
OVALOCYTES BLD QL SMEAR: SLIGHT — SIGNIFICANT CHANGE UP
OVALOCYTES BLD QL SMEAR: SLIGHT — SIGNIFICANT CHANGE UP
PHOSPHATE SERPL-MCNC: 2.7 MG/DL — SIGNIFICANT CHANGE UP (ref 2.4–4.7)
PHOSPHATE SERPL-MCNC: 2.7 MG/DL — SIGNIFICANT CHANGE UP (ref 2.4–4.7)
PLAT MORPH BLD: NORMAL — SIGNIFICANT CHANGE UP
PLAT MORPH BLD: NORMAL — SIGNIFICANT CHANGE UP
PLATELET # BLD AUTO: 160 K/UL — SIGNIFICANT CHANGE UP (ref 150–400)
PLATELET # BLD AUTO: 160 K/UL — SIGNIFICANT CHANGE UP (ref 150–400)
POIKILOCYTOSIS BLD QL AUTO: SIGNIFICANT CHANGE UP
POIKILOCYTOSIS BLD QL AUTO: SIGNIFICANT CHANGE UP
POLYCHROMASIA BLD QL SMEAR: SIGNIFICANT CHANGE UP
POLYCHROMASIA BLD QL SMEAR: SIGNIFICANT CHANGE UP
POTASSIUM SERPL-MCNC: 2.8 MMOL/L — CRITICAL LOW (ref 3.5–5.3)
POTASSIUM SERPL-MCNC: 2.8 MMOL/L — CRITICAL LOW (ref 3.5–5.3)
POTASSIUM SERPL-MCNC: 3.8 MMOL/L — SIGNIFICANT CHANGE UP (ref 3.5–5.3)
POTASSIUM SERPL-MCNC: 3.8 MMOL/L — SIGNIFICANT CHANGE UP (ref 3.5–5.3)
POTASSIUM SERPL-SCNC: 2.8 MMOL/L — CRITICAL LOW (ref 3.5–5.3)
POTASSIUM SERPL-SCNC: 2.8 MMOL/L — CRITICAL LOW (ref 3.5–5.3)
POTASSIUM SERPL-SCNC: 3.8 MMOL/L — SIGNIFICANT CHANGE UP (ref 3.5–5.3)
POTASSIUM SERPL-SCNC: 3.8 MMOL/L — SIGNIFICANT CHANGE UP (ref 3.5–5.3)
PROT SERPL-MCNC: 5 G/DL — LOW (ref 6.6–8.7)
PROT SERPL-MCNC: 5 G/DL — LOW (ref 6.6–8.7)
RBC # BLD: 2.57 M/UL — LOW (ref 4.2–5.8)
RBC # BLD: 2.57 M/UL — LOW (ref 4.2–5.8)
RBC # FLD: 15.9 % — HIGH (ref 10.3–14.5)
RBC # FLD: 15.9 % — HIGH (ref 10.3–14.5)
RBC BLD AUTO: ABNORMAL
RBC BLD AUTO: ABNORMAL
SODIUM SERPL-SCNC: 140 MMOL/L — SIGNIFICANT CHANGE UP (ref 135–145)
SODIUM SERPL-SCNC: 140 MMOL/L — SIGNIFICANT CHANGE UP (ref 135–145)
SODIUM SERPL-SCNC: 141 MMOL/L — SIGNIFICANT CHANGE UP (ref 135–145)
SODIUM SERPL-SCNC: 141 MMOL/L — SIGNIFICANT CHANGE UP (ref 135–145)
SPECIMEN SOURCE: SIGNIFICANT CHANGE UP
WBC # BLD: 10.05 K/UL — SIGNIFICANT CHANGE UP (ref 3.8–10.5)
WBC # BLD: 10.05 K/UL — SIGNIFICANT CHANGE UP (ref 3.8–10.5)
WBC # FLD AUTO: 10.05 K/UL — SIGNIFICANT CHANGE UP (ref 3.8–10.5)
WBC # FLD AUTO: 10.05 K/UL — SIGNIFICANT CHANGE UP (ref 3.8–10.5)

## 2023-12-25 PROCEDURE — 99291 CRITICAL CARE FIRST HOUR: CPT

## 2023-12-25 RX ORDER — VORICONAZOLE 10 MG/ML
300 INJECTION, POWDER, LYOPHILIZED, FOR SOLUTION INTRAVENOUS EVERY 12 HOURS
Refills: 0 | Status: DISCONTINUED | OUTPATIENT
Start: 2023-12-26 | End: 2023-12-26

## 2023-12-25 RX ORDER — VORICONAZOLE 10 MG/ML
350 INJECTION, POWDER, LYOPHILIZED, FOR SOLUTION INTRAVENOUS EVERY 12 HOURS
Refills: 0 | Status: DISCONTINUED | OUTPATIENT
Start: 2023-12-25 | End: 2023-12-25

## 2023-12-25 RX ORDER — POTASSIUM CHLORIDE 20 MEQ
40 PACKET (EA) ORAL ONCE
Refills: 0 | Status: COMPLETED | OUTPATIENT
Start: 2023-12-25 | End: 2023-12-25

## 2023-12-25 RX ORDER — POTASSIUM CHLORIDE 20 MEQ
10 PACKET (EA) ORAL
Refills: 0 | Status: COMPLETED | OUTPATIENT
Start: 2023-12-25 | End: 2023-12-26

## 2023-12-25 RX ORDER — VORICONAZOLE 10 MG/ML
400 INJECTION, POWDER, LYOPHILIZED, FOR SOLUTION INTRAVENOUS EVERY 12 HOURS
Refills: 0 | Status: COMPLETED | OUTPATIENT
Start: 2023-12-25 | End: 2023-12-25

## 2023-12-25 RX ORDER — POTASSIUM CHLORIDE 20 MEQ
20 PACKET (EA) ORAL
Refills: 0 | Status: COMPLETED | OUTPATIENT
Start: 2023-12-25 | End: 2023-12-25

## 2023-12-25 RX ADMIN — Medication 50 MILLIEQUIVALENT(S): at 10:48

## 2023-12-25 RX ADMIN — Medication 40 MILLIEQUIVALENT(S): at 06:23

## 2023-12-25 RX ADMIN — Medication 50 MILLIEQUIVALENT(S): at 09:20

## 2023-12-25 RX ADMIN — LEVETIRACETAM 1000 MILLIGRAM(S): 250 TABLET, FILM COATED ORAL at 06:21

## 2023-12-25 RX ADMIN — MEROPENEM 1000 MILLIGRAM(S): 1 INJECTION INTRAVENOUS at 02:02

## 2023-12-25 RX ADMIN — VORICONAZOLE 125 MILLIGRAM(S): 10 INJECTION, POWDER, LYOPHILIZED, FOR SOLUTION INTRAVENOUS at 06:24

## 2023-12-25 RX ADMIN — PANTOPRAZOLE SODIUM 40 MILLIGRAM(S): 20 TABLET, DELAYED RELEASE ORAL at 19:09

## 2023-12-25 RX ADMIN — CHLORHEXIDINE GLUCONATE 1 APPLICATION(S): 213 SOLUTION TOPICAL at 06:22

## 2023-12-25 RX ADMIN — MIDODRINE HYDROCHLORIDE 5 MILLIGRAM(S): 2.5 TABLET ORAL at 06:20

## 2023-12-25 RX ADMIN — MEMANTINE HYDROCHLORIDE 5 MILLIGRAM(S): 10 TABLET ORAL at 06:20

## 2023-12-25 RX ADMIN — MEROPENEM 1000 MILLIGRAM(S): 1 INJECTION INTRAVENOUS at 14:29

## 2023-12-25 RX ADMIN — Medication 50 MILLIEQUIVALENT(S): at 06:23

## 2023-12-25 RX ADMIN — LEVETIRACETAM 1000 MILLIGRAM(S): 250 TABLET, FILM COATED ORAL at 21:24

## 2023-12-25 RX ADMIN — PANTOPRAZOLE SODIUM 40 MILLIGRAM(S): 20 TABLET, DELAYED RELEASE ORAL at 06:21

## 2023-12-25 RX ADMIN — Medication 100 MILLIEQUIVALENT(S): at 22:16

## 2023-12-25 RX ADMIN — VORICONAZOLE 125 MILLIGRAM(S): 10 INJECTION, POWDER, LYOPHILIZED, FOR SOLUTION INTRAVENOUS at 21:24

## 2023-12-25 RX ADMIN — Medication 100 MILLIEQUIVALENT(S): at 23:29

## 2023-12-25 RX ADMIN — Medication 50 MILLIGRAM(S): at 06:21

## 2023-12-25 NOTE — AIRWAY REMOVAL NOTE  ADULT & PEDS - ARTIFICAL AIRWAY REMOVAL COMMENTS
Patient extubated without occurrences noted.  Patient tolerated well.   Nursing and PA at bedside.   02 sat=98% on 4lpm NC.
pt extubated to 30% aerosol mask

## 2023-12-25 NOTE — AIRWAY REMOVAL NOTE  ADULT & PEDS - RESPIRATORY EXPANSION/ACCESSORY MUSCLES/RETRACTIONS
no use of accessory muscles/no retractions
no use of accessory muscles/no retractions/expansion symmetric

## 2023-12-25 NOTE — PROGRESS NOTE ADULT - CRITICAL CARE ATTENDING COMMENT
I spent 60 minutes of critical care time examining patient, reviewing vitals, labs, medications, imaging and discussing with the team goals of care to prevent life-threatening in this patient who is at high risk for deterioration or death due to:  R frontal IPH, SDH, tSAH
I have personally provided the above mentioned minutes of critical care time including review of laboratory values, imaging, interdisciplinary care coordination, and frequent monitoring for decompensation.    Based on my personal evaluation, this patient has a high probability of imminent or life-threatening deterioration due to the presence of: TBI, hypernatremia, respiratory compromise, tracheobronchitis -  which required my direct attention, intervention, and personal management. Other billable procedures, if performed, are documented separately.
I spent 60 minutes of critical care time examining patient, reviewing vitals, labs, medications, imaging and discussing with the team goals of care to prevent life-threatening in this patient who is at high risk for deterioration or death due to: worsening subdural hemorrhage, DVT, DONNIE, h/o HIT.
I have personally provided the above mentioned minutes of critical care time including review of laboratory values, imaging, interdisciplinary care coordination, and frequent monitoring for decompensation.    Based on my personal evaluation, this patient has a high probability of imminent or life-threatening deterioration due to the presence of: TBI, hypernatremia, respiratory compromise, tracheobronchitis -  which required my direct attention, intervention, and personal management. Other billable procedures, if performed, are documented separately.
I have personally provided the above mentioned minutes of critical care time including review of laboratory values, imaging, interdisciplinary care coordination, and frequent monitoring for decompensation.    Based on my personal evaluation, this patient has a high probability of imminent or life-threatening deterioration due to the presence of: TBI, respiratory compromise, tracheobronchitis, leukocytosis -  which required my direct attention, intervention, and personal management. Other billable procedures, if performed, are documented separately.
I spent 60 minutes of critical care time examining patient, reviewing vitals, labs, medications, imaging and discussing with the team goals of care to prevent life-threatening in this patient who is at high risk for deterioration or death due to:  SDH
Pt is critically ill and at high risk of rapid deterioration/death due to abovementioned conditions.   Still requires critical care interventions - ongoing frequent evaluations, interventions and management adjustment by the Attending and ICU team, - and included review of relevant history, clinical examination, review of data and images, discussion of treatment with the multidisciplinary team and any consultants involved in this patient’s care as well as family discussion.
Critical care time spent weaning off IV sedatives, titrating vasoactive medications, adjusting mechanical ventilator settings and performing spontaneous breathing trials in attempts to liberate from mechanical ventilator, interpreting blood gases and chest imaging.

## 2023-12-25 NOTE — PROGRESS NOTE ADULT - ASSESSMENT
82y M with PMH HTN, CAD s/p stents, renal cell carcinoma status post left nephrectomy, bladder CA, BPH, CHF, LBBB, vertigo,  HLD, 5.5cm AAA without rupture post EVAR, paroxysmal A-fib on Eliquis, Plavix, and aspirin, early stage Alzheimer dementia transferred from [place] with fall and ICH, underwent craniectomy and eventual cranioplasty.  Hospital course complicated by 82y M with PMH HTN, CAD s/p stents, renal cell carcinoma status post left nephrectomy, bladder CA, BPH, CHF, LBBB, vertigo,  HLD, 5.5cm AAA without rupture post EVAR, paroxysmal A-fib on Eliquis, Plavix, and aspirin, early stage Alzheimer dementia transferred from [place] with fall and ICH, underwent craniectomy and eventual cranioplasty.  Hospital course complicated by 82y M with PMH HTN, CAD s/p PCI, renal cell carcinoma status post left nephrectomy, bladder CA, BPH, CHF, LBBB, vertigo,  HLD, 5.5cm AAA s/p post EVAR, paroxysmal A-fib on Eliquis, Plavix, and aspirin, hx of HIT, early stage Alzheimer dementia transferred from [place] with fall and ICH, underwent craniectomy and eventual cranioplasty.  Hospital course complicated by afib with RVR, Klebsiella pneumonia, LUE DVT, multiple subsequent episodes of ICH after attempting to restart AC, now with acute respiratory failure, aspiration pneumonia, shock, hematemesis.      Plan  Acute respiratory failure/ aspiration pneumonia  - extubated today  - currently on meropenem   - grew Klebsiella on 12/21    Fungemia  - yeast growing in blood  - started on vori  - repeat blood cultures sent today 12/25  - ID consult     Coffee ground emesis  - no active bleeding  - on PPI  - family declined EGD in the past  - supportive transfusions as needed    Afib  - currently in sinus  - off AC due to multiple bleeding episodes (ICH, GI bleed)    L upper ext DVT  - off AC due to bleeding  - lower extremities negative for DVT    Sedation/Analgesia: weaned off propofol   Vasoactive medications: weaning off phenylephrine   DVT prophylaxis: SCDs  GI prophylaxis: ppi  Nutrition: swallow eval  Mobility: OOB as tolerated, PT  Family/Patient engagement/GOC: karla Lemos at bedside 82y M with PMH HTN, CAD s/p PCI, renal cell carcinoma status post left nephrectomy, bladder CA, BPH, CHF, LBBB, vertigo,  HLD, 5.5cm AAA s/p post EVAR, paroxysmal A-fib on Eliquis, Plavix, and aspirin, hx of HIT, early stage Alzheimer dementia transferred from [place] with fall and ICH, underwent craniectomy and eventual cranioplasty.  Hospital course complicated by afib with RVR, Klebsiella pneumonia, LUE DVT, multiple subsequent episodes of ICH after attempting to restart AC, now with acute respiratory failure, aspiration pneumonia, shock, hematemesis.      Plan  Acute respiratory failure/ aspiration pneumonia  - extubated today  - currently on meropenem   - grew Klebsiella on 12/21    Fungemia  - yeast growing in blood  - started on vori  - repeat blood cultures sent today 12/25  - ID consult     Coffee ground emesis  - no active bleeding  - on PPI  - family declined EGD in the past  - supportive transfusions as needed    Afib  - currently in sinus  - off AC due to multiple bleeding episodes (ICH, GI bleed)    L upper ext DVT  - off AC due to bleeding  - lower extremities negative for DVT    Sedation/Analgesia: weaned off propofol   Vasoactive medications: weaning off phenylephrine   DVT prophylaxis: SCDs  GI prophylaxis: ppi  Nutrition: swallow eval  Mobility: OOB as tolerated, PT  Family/Patient engagement/GOC: karla Lemos at bedside

## 2023-12-26 LAB
ALBUMIN SERPL ELPH-MCNC: 2.1 G/DL — LOW (ref 3.3–5.2)
ALBUMIN SERPL ELPH-MCNC: 2.1 G/DL — LOW (ref 3.3–5.2)
ALP SERPL-CCNC: 83 U/L — SIGNIFICANT CHANGE UP (ref 40–120)
ALP SERPL-CCNC: 83 U/L — SIGNIFICANT CHANGE UP (ref 40–120)
ALT FLD-CCNC: 30 U/L — SIGNIFICANT CHANGE UP
ALT FLD-CCNC: 30 U/L — SIGNIFICANT CHANGE UP
ANION GAP SERPL CALC-SCNC: 12 MMOL/L — SIGNIFICANT CHANGE UP (ref 5–17)
ANION GAP SERPL CALC-SCNC: 12 MMOL/L — SIGNIFICANT CHANGE UP (ref 5–17)
ANION GAP SERPL CALC-SCNC: 15 MMOL/L — SIGNIFICANT CHANGE UP (ref 5–17)
ANION GAP SERPL CALC-SCNC: 15 MMOL/L — SIGNIFICANT CHANGE UP (ref 5–17)
AST SERPL-CCNC: 43 U/L — HIGH
AST SERPL-CCNC: 43 U/L — HIGH
BASOPHILS # BLD AUTO: 0 K/UL — SIGNIFICANT CHANGE UP (ref 0–0.2)
BASOPHILS # BLD AUTO: 0 K/UL — SIGNIFICANT CHANGE UP (ref 0–0.2)
BASOPHILS NFR BLD AUTO: 0 % — SIGNIFICANT CHANGE UP (ref 0–2)
BASOPHILS NFR BLD AUTO: 0 % — SIGNIFICANT CHANGE UP (ref 0–2)
BILIRUB SERPL-MCNC: 0.2 MG/DL — LOW (ref 0.4–2)
BILIRUB SERPL-MCNC: 0.2 MG/DL — LOW (ref 0.4–2)
BUN SERPL-MCNC: 29.4 MG/DL — HIGH (ref 8–20)
BUN SERPL-MCNC: 29.4 MG/DL — HIGH (ref 8–20)
BUN SERPL-MCNC: 31.3 MG/DL — HIGH (ref 8–20)
BUN SERPL-MCNC: 31.3 MG/DL — HIGH (ref 8–20)
CALCIUM SERPL-MCNC: 8.3 MG/DL — LOW (ref 8.4–10.5)
CALCIUM SERPL-MCNC: 8.3 MG/DL — LOW (ref 8.4–10.5)
CALCIUM SERPL-MCNC: 8.5 MG/DL — SIGNIFICANT CHANGE UP (ref 8.4–10.5)
CALCIUM SERPL-MCNC: 8.5 MG/DL — SIGNIFICANT CHANGE UP (ref 8.4–10.5)
CHLORIDE SERPL-SCNC: 112 MMOL/L — HIGH (ref 96–108)
CO2 SERPL-SCNC: 19 MMOL/L — LOW (ref 22–29)
CO2 SERPL-SCNC: 19 MMOL/L — LOW (ref 22–29)
CO2 SERPL-SCNC: 20 MMOL/L — LOW (ref 22–29)
CO2 SERPL-SCNC: 20 MMOL/L — LOW (ref 22–29)
CREAT SERPL-MCNC: 0.96 MG/DL — SIGNIFICANT CHANGE UP (ref 0.5–1.3)
CREAT SERPL-MCNC: 0.96 MG/DL — SIGNIFICANT CHANGE UP (ref 0.5–1.3)
CREAT SERPL-MCNC: 1.02 MG/DL — SIGNIFICANT CHANGE UP (ref 0.5–1.3)
CREAT SERPL-MCNC: 1.02 MG/DL — SIGNIFICANT CHANGE UP (ref 0.5–1.3)
CULTURE RESULTS: ABNORMAL
CULTURE RESULTS: ABNORMAL
CULTURE RESULTS: SIGNIFICANT CHANGE UP
CULTURE RESULTS: SIGNIFICANT CHANGE UP
EGFR: 73 ML/MIN/1.73M2 — SIGNIFICANT CHANGE UP
EGFR: 73 ML/MIN/1.73M2 — SIGNIFICANT CHANGE UP
EGFR: 79 ML/MIN/1.73M2 — SIGNIFICANT CHANGE UP
EGFR: 79 ML/MIN/1.73M2 — SIGNIFICANT CHANGE UP
EOSINOPHIL # BLD AUTO: 0 K/UL — SIGNIFICANT CHANGE UP (ref 0–0.5)
EOSINOPHIL # BLD AUTO: 0 K/UL — SIGNIFICANT CHANGE UP (ref 0–0.5)
EOSINOPHIL NFR BLD AUTO: 0 % — SIGNIFICANT CHANGE UP (ref 0–6)
EOSINOPHIL NFR BLD AUTO: 0 % — SIGNIFICANT CHANGE UP (ref 0–6)
GIANT PLATELETS BLD QL SMEAR: PRESENT — SIGNIFICANT CHANGE UP
GIANT PLATELETS BLD QL SMEAR: PRESENT — SIGNIFICANT CHANGE UP
GLUCOSE BLDC GLUCOMTR-MCNC: 91 MG/DL — SIGNIFICANT CHANGE UP (ref 70–99)
GLUCOSE BLDC GLUCOMTR-MCNC: 91 MG/DL — SIGNIFICANT CHANGE UP (ref 70–99)
GLUCOSE SERPL-MCNC: 78 MG/DL — SIGNIFICANT CHANGE UP (ref 70–99)
GLUCOSE SERPL-MCNC: 78 MG/DL — SIGNIFICANT CHANGE UP (ref 70–99)
GLUCOSE SERPL-MCNC: 86 MG/DL — SIGNIFICANT CHANGE UP (ref 70–99)
GLUCOSE SERPL-MCNC: 86 MG/DL — SIGNIFICANT CHANGE UP (ref 70–99)
HCT VFR BLD CALC: 25.1 % — LOW (ref 39–50)
HCT VFR BLD CALC: 25.1 % — LOW (ref 39–50)
HGB BLD-MCNC: 9 G/DL — LOW (ref 13–17)
HGB BLD-MCNC: 9 G/DL — LOW (ref 13–17)
LYMPHOCYTES # BLD AUTO: 0.18 K/UL — LOW (ref 1–3.3)
LYMPHOCYTES # BLD AUTO: 0.18 K/UL — LOW (ref 1–3.3)
LYMPHOCYTES # BLD AUTO: 1.7 % — LOW (ref 13–44)
LYMPHOCYTES # BLD AUTO: 1.7 % — LOW (ref 13–44)
MAGNESIUM SERPL-MCNC: 1.8 MG/DL — SIGNIFICANT CHANGE UP (ref 1.6–2.6)
MAGNESIUM SERPL-MCNC: 1.8 MG/DL — SIGNIFICANT CHANGE UP (ref 1.6–2.6)
MAGNESIUM SERPL-MCNC: 2.4 MG/DL — SIGNIFICANT CHANGE UP (ref 1.6–2.6)
MAGNESIUM SERPL-MCNC: 2.4 MG/DL — SIGNIFICANT CHANGE UP (ref 1.6–2.6)
MANUAL SMEAR VERIFICATION: SIGNIFICANT CHANGE UP
MANUAL SMEAR VERIFICATION: SIGNIFICANT CHANGE UP
MCHC RBC-ENTMCNC: 31.9 PG — SIGNIFICANT CHANGE UP (ref 27–34)
MCHC RBC-ENTMCNC: 31.9 PG — SIGNIFICANT CHANGE UP (ref 27–34)
MCHC RBC-ENTMCNC: 35.9 GM/DL — SIGNIFICANT CHANGE UP (ref 32–36)
MCHC RBC-ENTMCNC: 35.9 GM/DL — SIGNIFICANT CHANGE UP (ref 32–36)
MCV RBC AUTO: 89 FL — SIGNIFICANT CHANGE UP (ref 80–100)
MCV RBC AUTO: 89 FL — SIGNIFICANT CHANGE UP (ref 80–100)
MONOCYTES # BLD AUTO: 0.27 K/UL — SIGNIFICANT CHANGE UP (ref 0–0.9)
MONOCYTES # BLD AUTO: 0.27 K/UL — SIGNIFICANT CHANGE UP (ref 0–0.9)
MONOCYTES NFR BLD AUTO: 2.6 % — SIGNIFICANT CHANGE UP (ref 2–14)
MONOCYTES NFR BLD AUTO: 2.6 % — SIGNIFICANT CHANGE UP (ref 2–14)
NEUTROPHILS # BLD AUTO: 10 K/UL — HIGH (ref 1.8–7.4)
NEUTROPHILS # BLD AUTO: 10 K/UL — HIGH (ref 1.8–7.4)
NEUTROPHILS NFR BLD AUTO: 95.7 % — HIGH (ref 43–77)
NEUTROPHILS NFR BLD AUTO: 95.7 % — HIGH (ref 43–77)
PHOSPHATE SERPL-MCNC: 1.7 MG/DL — LOW (ref 2.4–4.7)
PHOSPHATE SERPL-MCNC: 1.7 MG/DL — LOW (ref 2.4–4.7)
PHOSPHATE SERPL-MCNC: 3.2 MG/DL — SIGNIFICANT CHANGE UP (ref 2.4–4.7)
PHOSPHATE SERPL-MCNC: 3.2 MG/DL — SIGNIFICANT CHANGE UP (ref 2.4–4.7)
PLAT MORPH BLD: NORMAL — SIGNIFICANT CHANGE UP
PLAT MORPH BLD: NORMAL — SIGNIFICANT CHANGE UP
PLATELET # BLD AUTO: 138 K/UL — LOW (ref 150–400)
PLATELET # BLD AUTO: 138 K/UL — LOW (ref 150–400)
POIKILOCYTOSIS BLD QL AUTO: SLIGHT — SIGNIFICANT CHANGE UP
POIKILOCYTOSIS BLD QL AUTO: SLIGHT — SIGNIFICANT CHANGE UP
POLYCHROMASIA BLD QL SMEAR: SLIGHT — SIGNIFICANT CHANGE UP
POLYCHROMASIA BLD QL SMEAR: SLIGHT — SIGNIFICANT CHANGE UP
POTASSIUM SERPL-MCNC: 2.9 MMOL/L — CRITICAL LOW (ref 3.5–5.3)
POTASSIUM SERPL-MCNC: 2.9 MMOL/L — CRITICAL LOW (ref 3.5–5.3)
POTASSIUM SERPL-MCNC: 3 MMOL/L — LOW (ref 3.5–5.3)
POTASSIUM SERPL-MCNC: 3 MMOL/L — LOW (ref 3.5–5.3)
POTASSIUM SERPL-SCNC: 2.9 MMOL/L — CRITICAL LOW (ref 3.5–5.3)
POTASSIUM SERPL-SCNC: 2.9 MMOL/L — CRITICAL LOW (ref 3.5–5.3)
POTASSIUM SERPL-SCNC: 3 MMOL/L — LOW (ref 3.5–5.3)
POTASSIUM SERPL-SCNC: 3 MMOL/L — LOW (ref 3.5–5.3)
PROT SERPL-MCNC: 4.8 G/DL — LOW (ref 6.6–8.7)
PROT SERPL-MCNC: 4.8 G/DL — LOW (ref 6.6–8.7)
RBC # BLD: 2.82 M/UL — LOW (ref 4.2–5.8)
RBC # BLD: 2.82 M/UL — LOW (ref 4.2–5.8)
RBC # FLD: 16.8 % — HIGH (ref 10.3–14.5)
RBC # FLD: 16.8 % — HIGH (ref 10.3–14.5)
RBC BLD AUTO: ABNORMAL
RBC BLD AUTO: ABNORMAL
SODIUM SERPL-SCNC: 144 MMOL/L — SIGNIFICANT CHANGE UP (ref 135–145)
SODIUM SERPL-SCNC: 144 MMOL/L — SIGNIFICANT CHANGE UP (ref 135–145)
SODIUM SERPL-SCNC: 146 MMOL/L — HIGH (ref 135–145)
SODIUM SERPL-SCNC: 146 MMOL/L — HIGH (ref 135–145)
SPECIMEN SOURCE: SIGNIFICANT CHANGE UP
WBC # BLD: 10.45 K/UL — SIGNIFICANT CHANGE UP (ref 3.8–10.5)
WBC # BLD: 10.45 K/UL — SIGNIFICANT CHANGE UP (ref 3.8–10.5)
WBC # FLD AUTO: 10.45 K/UL — SIGNIFICANT CHANGE UP (ref 3.8–10.5)
WBC # FLD AUTO: 10.45 K/UL — SIGNIFICANT CHANGE UP (ref 3.8–10.5)

## 2023-12-26 PROCEDURE — 99232 SBSQ HOSP IP/OBS MODERATE 35: CPT

## 2023-12-26 PROCEDURE — 99233 SBSQ HOSP IP/OBS HIGH 50: CPT

## 2023-12-26 PROCEDURE — 99221 1ST HOSP IP/OBS SF/LOW 40: CPT

## 2023-12-26 RX ORDER — CASPOFUNGIN ACETATE 7 MG/ML
70 INJECTION, POWDER, LYOPHILIZED, FOR SOLUTION INTRAVENOUS ONCE
Refills: 0 | Status: COMPLETED | OUTPATIENT
Start: 2023-12-26 | End: 2023-12-26

## 2023-12-26 RX ORDER — METOPROLOL TARTRATE 50 MG
5 TABLET ORAL EVERY 6 HOURS
Refills: 0 | Status: DISCONTINUED | OUTPATIENT
Start: 2023-12-26 | End: 2023-12-30

## 2023-12-26 RX ORDER — SODIUM CHLORIDE 9 MG/ML
1000 INJECTION, SOLUTION INTRAVENOUS
Refills: 0 | Status: DISCONTINUED | OUTPATIENT
Start: 2023-12-26 | End: 2023-12-27

## 2023-12-26 RX ORDER — POTASSIUM PHOSPHATE, MONOBASIC POTASSIUM PHOSPHATE, DIBASIC 236; 224 MG/ML; MG/ML
30 INJECTION, SOLUTION INTRAVENOUS ONCE
Refills: 0 | Status: COMPLETED | OUTPATIENT
Start: 2023-12-26 | End: 2023-12-26

## 2023-12-26 RX ORDER — CASPOFUNGIN ACETATE 7 MG/ML
50 INJECTION, POWDER, LYOPHILIZED, FOR SOLUTION INTRAVENOUS EVERY 24 HOURS
Refills: 0 | Status: DISCONTINUED | OUTPATIENT
Start: 2023-12-27 | End: 2024-01-09

## 2023-12-26 RX ORDER — CASPOFUNGIN ACETATE 7 MG/ML
INJECTION, POWDER, LYOPHILIZED, FOR SOLUTION INTRAVENOUS
Refills: 0 | Status: DISCONTINUED | OUTPATIENT
Start: 2023-12-26 | End: 2024-01-09

## 2023-12-26 RX ORDER — POTASSIUM CHLORIDE 20 MEQ
10 PACKET (EA) ORAL
Refills: 0 | Status: COMPLETED | OUTPATIENT
Start: 2023-12-26 | End: 2023-12-26

## 2023-12-26 RX ORDER — METOPROLOL TARTRATE 50 MG
5 TABLET ORAL ONCE
Refills: 0 | Status: COMPLETED | OUTPATIENT
Start: 2023-12-26 | End: 2023-12-26

## 2023-12-26 RX ORDER — MAGNESIUM SULFATE 500 MG/ML
2 VIAL (ML) INJECTION ONCE
Refills: 0 | Status: COMPLETED | OUTPATIENT
Start: 2023-12-26 | End: 2023-12-26

## 2023-12-26 RX ORDER — POTASSIUM CHLORIDE 20 MEQ
20 PACKET (EA) ORAL ONCE
Refills: 0 | Status: COMPLETED | OUTPATIENT
Start: 2023-12-26 | End: 2023-12-26

## 2023-12-26 RX ADMIN — MEROPENEM 1000 MILLIGRAM(S): 1 INJECTION INTRAVENOUS at 01:24

## 2023-12-26 RX ADMIN — Medication 5 MILLIGRAM(S): at 11:34

## 2023-12-26 RX ADMIN — SODIUM CHLORIDE 88 MILLILITER(S): 9 INJECTION, SOLUTION INTRAVENOUS at 19:33

## 2023-12-26 RX ADMIN — Medication 5 MILLIGRAM(S): at 18:22

## 2023-12-26 RX ADMIN — LEVETIRACETAM 1000 MILLIGRAM(S): 250 TABLET, FILM COATED ORAL at 18:23

## 2023-12-26 RX ADMIN — CHLORHEXIDINE GLUCONATE 1 APPLICATION(S): 213 SOLUTION TOPICAL at 05:49

## 2023-12-26 RX ADMIN — Medication 100 MILLIEQUIVALENT(S): at 19:33

## 2023-12-26 RX ADMIN — VORICONAZOLE 125 MILLIGRAM(S): 10 INJECTION, POWDER, LYOPHILIZED, FOR SOLUTION INTRAVENOUS at 06:48

## 2023-12-26 RX ADMIN — Medication 100 MILLIEQUIVALENT(S): at 21:18

## 2023-12-26 RX ADMIN — Medication 5 MILLIGRAM(S): at 05:56

## 2023-12-26 RX ADMIN — CASPOFUNGIN ACETATE 260 MILLIGRAM(S): 7 INJECTION, POWDER, LYOPHILIZED, FOR SOLUTION INTRAVENOUS at 13:10

## 2023-12-26 RX ADMIN — Medication 100 MILLIEQUIVALENT(S): at 05:48

## 2023-12-26 RX ADMIN — Medication 100 MILLIEQUIVALENT(S): at 18:31

## 2023-12-26 RX ADMIN — MEROPENEM 1000 MILLIGRAM(S): 1 INJECTION INTRAVENOUS at 13:34

## 2023-12-26 RX ADMIN — PANTOPRAZOLE SODIUM 40 MILLIGRAM(S): 20 TABLET, DELAYED RELEASE ORAL at 18:23

## 2023-12-26 RX ADMIN — Medication 25 GRAM(S): at 08:05

## 2023-12-26 RX ADMIN — PANTOPRAZOLE SODIUM 40 MILLIGRAM(S): 20 TABLET, DELAYED RELEASE ORAL at 05:48

## 2023-12-26 RX ADMIN — POTASSIUM PHOSPHATE, MONOBASIC POTASSIUM PHOSPHATE, DIBASIC 83.33 MILLIMOLE(S): 236; 224 INJECTION, SOLUTION INTRAVENOUS at 08:33

## 2023-12-26 RX ADMIN — LEVETIRACETAM 1000 MILLIGRAM(S): 250 TABLET, FILM COATED ORAL at 05:49

## 2023-12-26 RX ADMIN — Medication 100 MILLIEQUIVALENT(S): at 00:50

## 2023-12-26 NOTE — CHART NOTE - NSCHARTNOTEFT_GEN_A_CORE
Site identified and dressing removed, under direct visualization, R. femoral central venous and arterial catheters were removed free of breaks. Pressure held and dressing applied. Patient tolerated procedure well.

## 2023-12-26 NOTE — PROGRESS NOTE ADULT - ASSESSMENT
83y M with PMH HTN, CAD s/p PCI, renal cell carcinoma status post left nephrectomy, bladder CA, BPH, CHF, LBBB, vertigo,  HLD, 5.5cm AAA s/p post EVAR, paroxysmal afib on Eliquis, Plavix, and Aspirin, h/o HIT, early stage Alzheimer's dementia transferred from [place] 11/10/23 s/p fall found with multicompartmental ICH, s/p R craniectomy 11/10 and eventual cranioplasty 12/7/23. Hospital course complicated by afib with RVR, Klebsiella pneumonia, LUE DVT, multiple subsequent episodes of ICH after attempting to restart Eliquis, now with acute respiratory failure, aspiration pneumonia, shock, hematemesis.  - Extubated 12/25  - Hemoglobin stable at 7.7  - Neuro exam stable    PLAN:  - Will d/w attending  - Q4 neuro checks  - Continue Keppra 1g BID  - Monitor incision  - SCDs for DVT ppx for now- will d/w when ok to resume chemical DVT ppx and ASA from neurosurgical perspective and if otherwise cleared with recent hematemesis   - Continue holding Eliquis- unsure when and if patient would be a safe candidate to resume with multiple episodes of new ICH after attempting to resume  - Supportive care/further medical management per MICU  - Final plan pending AM rounds and discussion with attending      83y M with PMH HTN, CAD s/p PCI, renal cell carcinoma status post left nephrectomy, bladder CA, BPH, CHF, LBBB, vertigo,  HLD, 5.5cm AAA s/p post EVAR, paroxysmal afib on Eliquis, Plavix, and Aspirin, h/o HIT, early stage Alzheimer's dementia transferred from [place] 11/10/23 s/p fall found with multicompartmental ICH, s/p R craniectomy 11/10 and eventual cranioplasty 12/7/23. Hospital course complicated by afib with RVR, Klebsiella pneumonia, LUE DVT, multiple subsequent episodes of ICH after attempting to restart Eliquis, now with acute respiratory failure, aspiration pneumonia, shock, hematemesis.  - Extubated 12/25  - Hemoglobin stable at 7.7  - Neuro exam stable    PLAN:  - Will d/w attending  - Q4 neuro checks  - Continue Keppra 1g BID  - Monitor incision  - SCDs for DVT ppx for now- will d/w when ok to resume chemical DVT ppx and ASA from neurosurgical perspective and if otherwise cleared with recent hematemesis   - Continue holding Eliquis- unsure when and if patient would be a safe candidate to resume with multiple episodes of new ICH after attempting to resume  - Supportive care/further medical management per MICU  - Final plan pending AM rounds and discussion with attending      83y M with PMH HTN, CAD s/p PCI, renal cell carcinoma status post left nephrectomy, bladder CA, BPH, CHF, LBBB, vertigo,  HLD, 5.5cm AAA s/p post EVAR, paroxysmal afib on Eliquis, Plavix, and Aspirin, h/o HIT, early stage Alzheimer's dementia transferred from [place] 11/10/23 s/p fall found with multicompartmental ICH, s/p R craniectomy 11/10 and eventual cranioplasty 12/7/23. Hospital course complicated by afib with RVR, Klebsiella pneumonia, LUE DVT, multiple subsequent episodes of ICH after attempting to restart Eliquis, now with acute respiratory failure, aspiration pneumonia, shock, hematemesis.  - Extubated 12/25  - Hemoglobin stable at 7.7  - Neuro exam stable    PLAN:  - Q4 neuro checks  - Continue Keppra 1g BID  - Monitor incision - no dressing needed at this time   - From neurosurgical standpoint okay to resume ASA 82 and chemo dvt ppx (previously on Fondaparinux for HIT positive)   - Continue holding Eliquis- unsure when and if patient would be a safe candidate to resume with multiple episodes of new ICH after attempting to resume  - Supportive care/further medical management per MICU       83y M with PMH HTN, CAD s/p PCI, renal cell carcinoma status post left nephrectomy, bladder CA, BPH, CHF, LBBB, vertigo,  HLD, 5.5cm AAA s/p post EVAR, paroxysmal afib on Eliquis, Plavix, and Aspirin, h/o HIT, early stage Alzheimer's dementia transferred from [place] 11/10/23 s/p fall found with multicompartmental ICH, s/p R craniectomy 11/10 and eventual cranioplasty 12/7/23. Hospital course complicated by afib with RVR, Klebsiella pneumonia, LUE DVT, multiple subsequent episodes of ICH after attempting to restart Eliquis, now with acute respiratory failure, aspiration pneumonia, shock, hematemesis.  - Extubated 12/25  - Hemoglobin stable at 7.7  - Neuro exam stable    PLAN:  - Q4 neuro checks  - Continue Keppra 1g BID  - Monitor incision - no dressing needed at this time   - From neurosurgical standpoint okay to resume ASA 82 and chemo dvt ppx (previously on Fondaparinux for HIT positive)   - Continue holding Eliquis- unsure when and if patient would be a safe candidate to resume with multiple episodes of new ICH after attempting to resume  - Supportive care/further medical management per MICU

## 2023-12-26 NOTE — CONSULT NOTE ADULT - REASON FOR ADMISSION
s/p unwitnessed fall with head strike
TBI s/p hemicraniectomy

## 2023-12-26 NOTE — CONSULT NOTE ADULT - CONSULT REASON
DONNIE
ICU
Unresponsive
Endoleak
To assist in the ethical dilemma of an 83-year-old male admitted for intraparenchymal hemorrhage, subdural hematoma and subarachnoid hemorrhage, regarding surrogacy.
ICH s/p R hemicraniectomy
ICH
leukocytosis
Cognitive deficits
stroke
Rule out ischemic bowel
r/o endocarditis
hist of aaa repair,
"hx RCC and bladder Ca admitted s/p fall with R frontal IPH with ccb DVT, AFib RVR, AHRF from aspiration requiring intubation, septic shock, GIB. Remains intubated, may need trach / PEG"
s/p cranioplasty
Coffee ground emesis

## 2023-12-26 NOTE — PROGRESS NOTE ADULT - ASSESSMENT
82y M with PMH HTN, CAD s/p stents, renal cell carcinoma status post left nephrectomy, bladder CA, BPH, CHF, LBBB, vertigo,  HLD, 5.5cm AAA without rupture post EVAR, paroxysmal A-fib on Eliquis, Plavix, and aspirin, early stage Alzheimer dementia transferred from [facility] s/p unwitnessed fall with head strike at home found by wife on floor at 8am. Patient brought to urgent care by wife and had 5 staples to posterior scalp but was instructed to go to nearest ED.  CT head at [place] showed R frontal IPH, SDH, SAH at 9:00. CTA stroke protocol not performed at [facility]. Patient BIB on 6L NC.  Pt GCS 15 on arrival, A&Ox2 on arrival. After initial assessment, patient noted to be vomiting enroute to CT scanner.   admitted 11/10 with prolonged hospitalization here.  being found to have right frontal IPH, 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  Extubated 11/13. Course complicated by Acute Kidney Injury, afib, EEG with epileptiform discharges. Patient was trasnferred to step down unit 11/23. TTE had shown possible vegetation on valve but repeat was not diagnostic and blood cultures were negative. Course further complicated by deep vein thrombosis or LUE brachial veins. Patient course further complicated by stroke found on MRI, WHIT recommended but family declined. He subsequently underwent cranioplasty on 12/7, drain removed 12/10. He had worsening of brain bleed 12/15, taken off anticoagulation and reversed. Patient then had acute decompensation on 12/21 for worsening mental status, vomiting, aspiration, and shock, requiring transfer to Intensive care unit for intubation, and pressors. Further complications including acute blood loss anemia requiring PRBCs, klebsiella pnuemonia, afib with RVR. He is now s/p extubation 12/25.  Palliative consulted for complex medical decision making in the setting of likely life-limiting illness.        82y M with PMH HTN, CAD s/p stents, renal cell carcinoma status post left nephrectomy, bladder CA, BPH, CHF, LBBB, vertigo,  HLD, 5.5cm AAA without rupture post EVAR, paroxysmal A-fib on Eliquis, Plavix, and aspirin, early stage Alzheimer dementia transferred from [facility] s/p unwitnessed fall with head strike at home found by wife on floor at 8am. Patient brought to urgent care by wife and had 5 staples to posterior scalp but was instructed to go to nearest ED.  CT head at [place] showed R frontal IPH, SDH, SAH at 9:00. CTA stroke protocol not performed at [facility]. Patient BIB on 6L NC.  Pt GCS 15 on arrival, A&Ox2 on arrival. After initial assessment, patient noted to be vomiting enroute to CT scanner.   admitted 11/10 with prolonged hospitalization here.  being found to have right frontal IPH, 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  Extubated 11/13. Course complicated by Acute Kidney Injury, afib, EEG with epileptiform discharges. Patient was trasnferred to step down unit 11/23. TTE had shown possible vegetation on valve but repeat was not diagnostic and blood cultures were negative. Course further complicated by deep vein thrombosis or LUE brachial veins. Patient course further complicated by stroke found on MRI, WHIT recommended but family declined. He subsequently underwent cranioplasty on 12/7, drain removed 12/10. He had worsening of brain bleed 12/15, taken off anticoagulation and reversed. Patient then had acute decompensation on 12/21 for worsening mental status, vomiting, aspiration, and shock, requiring transfer to Intensive care unit for intubation, and pressors. Further complications including acute blood loss anemia requiring PRBCs, klebsiella pnuemonia, afib with RVR. He is now s/p extubation 12/25.  Palliative consulted for complex medical decision making in the setting of likely life-limiting illness.        82y M with PMH HTN, CAD s/p stents, renal cell carcinoma status post left nephrectomy, bladder CA, BPH, CHF, LBBB, vertigo,  HLD, 5.5cm AAA without rupture post EVAR, paroxysmal A-fib on Eliquis, Plavix, and aspirin, early stage Alzheimer dementia transferred from [facility] s/p unwitnessed fall with head strike at home found by wife on floor at 8am. Patient brought to urgent care by wife and had 5 staples to posterior scalp but was instructed to go to nearest ED.  CT head at [place] showed R frontal IPH, SDH, SAH at 9:00. CTA stroke protocol not performed at [facility]. Patient BIB on 6L NC.  Pt GCS 15 on arrival, A&Ox2 on arrival. After initial assessment, patient noted to be vomiting enroute to CT scanner.   admitted 11/10 with prolonged hospitalization here.  being found to have right frontal IPH, 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  Extubated 11/13. Course complicated by Acute Kidney Injury, afib, EEG with epileptiform discharges. Patient was trasnferred to step down unit 11/23. TTE had shown possible vegetation on valve but repeat was not diagnostic and blood cultures were negative. Course further complicated by deep vein thrombosis or LUE brachial veins. Patient course further complicated by stroke found on MRI, WHIT recommended but family declined. He subsequently underwent cranioplasty on 12/7, drain removed 12/10. He had worsening of brain bleed 12/15, taken off anticoagulation and reversed. Patient then had acute decompensation on 12/21 for worsening mental status, vomiting, aspiration, and shock, requiring transfer to Intensive care unit for intubation, and pressors. Further complications including acute blood loss anemia requiring PRBCs, klebsiella pnuemonia, afib with RVR. He is now s/p extubation 12/25.      acute resp failure  aspiration pneumonia    speech eval pending    c/w meropenem ID following    fungemia: caspofungin per ID     afib: no a/c given ICH    lopressor IVP    ICH s/p craniogtomy and cranioplasty  abnormal EEG with risk of seizures    c/w keppra 1000mg BID, recall neurology in am to discuss about lowering it per daughters request    neurosurgery following    anemia: likely multifactorial    s/p PRBC    c/w PPI,     no intervention per GI.    hypokalemia: replete, trend    d/w daughters at bedside.    82y M with PMH HTN, CAD s/p stents, renal cell carcinoma status post left nephrectomy, bladder CA, BPH, CHF, LBBB, vertigo,  HLD, 5.5cm AAA without rupture post EVAR, paroxysmal A-fib on Eliquis, Plavix, and aspirin, early stage Alzheimer dementia transferred from [facility] s/p unwitnessed fall with head strike at home found by wife on floor at 8am. Patient brought to urgent care by wife and had 5 staples to posterior scalp but was instructed to go to nearest ED.  CT head at [place] showed R frontal IPH, SDH, SAH at 9:00. CTA stroke protocol not performed at [facility]. Patient BIB on 6L NC.  Pt GCS 15 on arrival, A&Ox2 on arrival. After initial assessment, patient noted to be vomiting enroute to CT scanner.   admitted 11/10 with prolonged hospitalization here.  being found to have right frontal IPH, 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  Extubated 11/13. Course complicated by Acute Kidney Injury, afib, EEG with epileptiform discharges. Patient was trasnferred to step down unit 11/23. TTE had shown possible vegetation on valve but repeat was not diagnostic and blood cultures were negative. Course further complicated by deep vein thrombosis or LUE brachial veins. Patient course further complicated by stroke found on MRI, WHIT recommended but family declined. He subsequently underwent cranioplasty on 12/7, drain removed 12/10. He had worsening of brain bleed 12/15, taken off anticoagulation and reversed. Patient then had acute decompensation on 12/21 for worsening mental status, vomiting, aspiration, and shock, requiring transfer to Intensive care unit for intubation, and pressors. Further complications including acute blood loss anemia requiring PRBCs, klebsiella pnuemonia, afib with RVR. He is now s/p extubation 12/25.      acute resp failure  aspiration pneumonia    speech eval pending    c/w meropenem ID following    fungemia: caspofungin per ID     afib: no a/c given ICH    lopressor IVP    ICH s/p craniogtomy and cranioplasty  abnormal EEG with risk of seizures    c/w keppra 1000mg BID, recall neurology in am to discuss about lowering it per daughters request    neurosurgery following    anemia: likely multifactorial    s/p PRBC    c/w PPI,     no intervention per GI.    hypokalemia: replete, trend    d/w daughters at bedside.

## 2023-12-26 NOTE — PROGRESS NOTE ADULT - SUBJECTIVE AND OBJECTIVE BOX
INTERVAL HPI/OVERNIGHT EVENTS:  83y M with PMH HTN, CAD s/p PCI, renal cell carcinoma status post left nephrectomy, bladder CA, BPH, CHF, LBBB, vertigo,  HLD, 5.5cm AAA s/p post EVAR, paroxysmal afib on Eliquis, Plavix, and Aspirin, h/o HIT, early stage Alzheimer's dementia transferred from [place] 11/10/23 s/p fall found with multicompartmental ICH, s/p R craniectomy 11/10 and eventual cranioplasty 12/7/23. Hospital course complicated by afib with RVR, Klebsiella pneumonia, LUE DVT, multiple subsequent episodes of ICH after attempting to restart Eliquis, now with acute respiratory failure, aspiration pneumonia, shock, hematemesis. Extubated 12/25    Patient seen this AM, undergoing chest PT/respiratory treatment    Vital Signs Last 24 Hrs  T(C): 37.1 (25 Dec 2023 23:29), Max: 37.4 (25 Dec 2023 00:15)  T(F): 98.7 (25 Dec 2023 23:29), Max: 99.3 (25 Dec 2023 00:15)  HR: 107 (25 Dec 2023 23:00) (73 - 123)  BP: 137/64 (25 Dec 2023 22:00) (115/51 - 153/66)  BP(mean): 87 (25 Dec 2023 22:00) (70 - 92)  RR: 21 (25 Dec 2023 23:00) (13 - 31)  SpO2: 100% (25 Dec 2023 23:00) (97% - 100%)    Parameters below as of 25 Dec 2023 20:00  Patient On (Oxygen Delivery Method): mask, simple face    O2 Concentration (%): 30    PHYSICAL EXAM:  GENERAL: NAD  HEAD: s/p R cranioplasty. Incision clean and dry without drainage  JOEY COMA SCORE: E-3 V-1 (limited as undergoing respiratory treatment at time of exam) M-6 =10T  MENTAL STATUS: Awakes to voice, sustains eye opening. Follows commands on right  CRANIAL NERVES: PERRL. EOMI. Hearing appears intact   MOTOR: RUE able to bend at elbow and show two fingers. RLE wiggles toes to command, HF at least 2-3/5. LUE trace movement to peripheral noxious; LLE withdraws to noxious  SENSATION: as above to noxious    LABS:                        7.7    10.05 )-----------( 160      ( 25 Dec 2023 03:51 )             23.5     12-25    140  |  109<H>  |  36.5<H>  ----------------------------<  130<H>  3.8   |  19.0<L>  |  1.06    Ca    8.6      25 Dec 2023 13:00  Phos  2.7     12-25  Mg     2.1     12-25    TPro  5.0<L>  /  Alb  2.2<L>  /  TBili  0.3<L>  /  DBili  x   /  AST  48<H>  /  ALT  41<H>  /  AlkPhos  87  12-25    PT/INR - ( 24 Dec 2023 03:15 )   PT: 13.0 sec;   INR: 1.18 ratio      PTT - ( 24 Dec 2023 03:15 )  PTT:32.8 sec  Urinalysis Basic - ( 25 Dec 2023 13:00 )    Color: x / Appearance: x / SG: x / pH: x  Gluc: 130 mg/dL / Ketone: x  / Bili: x / Urobili: x   Blood: x / Protein: x / Nitrite: x   Leuk Esterase: x / RBC: x / WBC x   Sq Epi: x / Non Sq Epi: x / Bacteria: x    12-24 @ 07:01 - 12-25 @ 07:00  --------------------------------------------------------  IN: 1618.9 mL / OUT: 1395 mL / NET: 223.9 mL    12-25 @ 07:01  -  12-26 @ 00:08  --------------------------------------------------------  IN: 450 mL / OUT: 285 mL / NET: 165 mL    RADIOLOGY & ADDITIONAL TESTS:   INTERVAL HPI/OVERNIGHT EVENTS:  83y M with PMH HTN, CAD s/p PCI, renal cell carcinoma status post left nephrectomy, bladder CA, BPH, CHF, LBBB, vertigo,  HLD, 5.5cm AAA s/p post EVAR, paroxysmal afib on Eliquis, Plavix, and Aspirin, h/o HIT, early stage Alzheimer's dementia transferred from [place] 11/10/23 s/p fall found with multicompartmental ICH, s/p R craniectomy 11/10 and eventual cranioplasty 12/7/23. Hospital course complicated by afib with RVR, Klebsiella pneumonia, LUE DVT, multiple subsequent episodes of ICH after attempting to restart Eliquis, now with acute respiratory failure, aspiration pneumonia, shock, hematemesis. Extubated 12/25    Patient seen this AM, undergoing chest PT/respiratory treatment    Vital Signs Last 24 Hrs  T(C): 37.1 (25 Dec 2023 23:29), Max: 37.4 (25 Dec 2023 00:15)  T(F): 98.7 (25 Dec 2023 23:29), Max: 99.3 (25 Dec 2023 00:15)  HR: 107 (25 Dec 2023 23:00) (73 - 123)  BP: 137/64 (25 Dec 2023 22:00) (115/51 - 153/66)  BP(mean): 87 (25 Dec 2023 22:00) (70 - 92)  RR: 21 (25 Dec 2023 23:00) (13 - 31)  SpO2: 100% (25 Dec 2023 23:00) (97% - 100%)    Parameters below as of 25 Dec 2023 20:00  Patient On (Oxygen Delivery Method): mask, simple face    O2 Concentration (%): 30    PHYSICAL EXAM:  GENERAL: NAD  HEAD: s/p R cranioplasty. Incision clean and dry without drainage  JOEY COMA SCORE: E-3 V-1 (limited as undergoing respiratory treatment at time of exam) M-6 =10T  MENTAL STATUS: Awakes to voice, sustains eye opening. Follows commands on right  CRANIAL NERVES: PERRL. EOMI. Hearing appears intact   MOTOR: RUE able to bend at elbow and show two fingers. RLE wiggles toes to command, HF at least 2-3/5. LUE trace movement to peripheral noxious; LLE withdraws to noxious  SENSATION: as above to noxious    LABS:                        7.7    10.05 )-----------( 160      ( 25 Dec 2023 03:51 )             23.5     12-25    140  |  109<H>  |  36.5<H>  ----------------------------<  130<H>  3.8   |  19.0<L>  |  1.06    Ca    8.6      25 Dec 2023 13:00  Phos  2.7     12-25  Mg     2.1     12-25    TPro  5.0<L>  /  Alb  2.2<L>  /  TBili  0.3<L>  /  DBili  x   /  AST  48<H>  /  ALT  41<H>  /  AlkPhos  87  12-25    PT/INR - ( 24 Dec 2023 03:15 )   PT: 13.0 sec;   INR: 1.18 ratio      PTT - ( 24 Dec 2023 03:15 )  PTT:32.8 sec  Urinalysis Basic - ( 25 Dec 2023 13:00 )    Color: x / Appearance: x / SG: x / pH: x  Gluc: 130 mg/dL / Ketone: x  / Bili: x / Urobili: x   Blood: x / Protein: x / Nitrite: x   Leuk Esterase: x / RBC: x / WBC x   Sq Epi: x / Non Sq Epi: x / Bacteria: x    12-24 @ 07:01 - 12-25 @ 07:00  --------------------------------------------------------  IN: 1618.9 mL / OUT: 1395 mL / NET: 223.9 mL    12-25 @ 07:01  -  12-26 @ 00:08  --------------------------------------------------------  IN: 450 mL / OUT: 285 mL / NET: 165 mL    RADIOLOGY & ADDITIONAL TESTS:   INTERVAL HPI/OVERNIGHT EVENTS:  83y M with PMH HTN, CAD s/p PCI, renal cell carcinoma status post left nephrectomy, bladder CA, BPH, CHF, LBBB, vertigo,  HLD, 5.5cm AAA s/p post EVAR, paroxysmal afib on Eliquis, Plavix, and Aspirin, h/o HIT, early stage Alzheimer's dementia transferred from [place] 11/10/23 s/p fall found with multicompartmental ICH, s/p R craniectomy 11/10 and eventual cranioplasty 12/7/23. Hospital course complicated by afib with RVR, Klebsiella pneumonia, LUE DVT, multiple subsequent episodes of ICH after attempting to restart Eliquis, now with acute respiratory failure, aspiration pneumonia, shock, hematemesis. Extubated 12/25    Patient seen this AM, undergoing chest PT/respiratory treatment    Vital Signs Last 24 Hrs  T(C): 37.1 (25 Dec 2023 23:29), Max: 37.4 (25 Dec 2023 00:15)  T(F): 98.7 (25 Dec 2023 23:29), Max: 99.3 (25 Dec 2023 00:15)  HR: 107 (25 Dec 2023 23:00) (73 - 123)  BP: 137/64 (25 Dec 2023 22:00) (115/51 - 153/66)  BP(mean): 87 (25 Dec 2023 22:00) (70 - 92)  RR: 21 (25 Dec 2023 23:00) (13 - 31)  SpO2: 100% (25 Dec 2023 23:00) (97% - 100%)    Parameters below as of 25 Dec 2023 20:00  Patient On (Oxygen Delivery Method): mask, simple face    O2 Concentration (%): 30    PHYSICAL EXAM:  GENERAL: NAD  HEAD: s/p R cranioplasty. Incision clean and dry without drainage  JOEY COMA SCORE: E-3 V-4 M-6 =13  MENTAL STATUS: Awakes to voice, sustains eye opening. Oriented to self and hospital. Follows commands on right  CRANIAL NERVES: PERRL. EOMI. Face grossly symmetric. Hearing appears intact. Speech clear  MOTOR: RUE able to bend at elbow and show two fingers. RLE wiggles toes to command, HF at least 2-3/5. LUE trace movement to peripheral noxious; LLE withdraws to noxious  SENSATION: as above to noxious    LABS:                        7.7    10.05 )-----------( 160      ( 25 Dec 2023 03:51 )             23.5     12-25    140  |  109<H>  |  36.5<H>  ----------------------------<  130<H>  3.8   |  19.0<L>  |  1.06    Ca    8.6      25 Dec 2023 13:00  Phos  2.7     12-25  Mg     2.1     12-25    TPro  5.0<L>  /  Alb  2.2<L>  /  TBili  0.3<L>  /  DBili  x   /  AST  48<H>  /  ALT  41<H>  /  AlkPhos  87  12-25    PT/INR - ( 24 Dec 2023 03:15 )   PT: 13.0 sec;   INR: 1.18 ratio      PTT - ( 24 Dec 2023 03:15 )  PTT:32.8 sec  Urinalysis Basic - ( 25 Dec 2023 13:00 )    Color: x / Appearance: x / SG: x / pH: x  Gluc: 130 mg/dL / Ketone: x  / Bili: x / Urobili: x   Blood: x / Protein: x / Nitrite: x   Leuk Esterase: x / RBC: x / WBC x   Sq Epi: x / Non Sq Epi: x / Bacteria: x    12-24 @ 07:01 - 12-25 @ 07:00  --------------------------------------------------------  IN: 1618.9 mL / OUT: 1395 mL / NET: 223.9 mL    12-25 @ 07:01  -  12-26 @ 00:08  --------------------------------------------------------  IN: 450 mL / OUT: 285 mL / NET: 165 mL    RADIOLOGY & ADDITIONAL TESTS:   INTERVAL HPI/OVERNIGHT EVENTS:  83y M with PMH HTN, CAD s/p PCI, renal cell carcinoma status post left nephrectomy, bladder CA, BPH, CHF, LBBB, vertigo,  HLD, 5.5cm AAA s/p post EVAR, paroxysmal afib on Eliquis, Plavix, and Aspirin, h/o HIT, early stage Alzheimer's dementia transferred from [place] 11/10/23 s/p fall found with multicompartmental ICH, s/p R craniectomy 11/10 and eventual cranioplasty 12/7/23. Hospital course complicated by afib with RVR, Klebsiella pneumonia, LUE DVT, multiple subsequent episodes of ICH after attempting to restart Eliquis, now with acute respiratory failure, aspiration pneumonia, shock, hematemesis. Extubated 12/25    Patient seen this AM, undergoing chest PT/respiratory treatment    Vital Signs Last 24 Hrs  T(C): 37.1 (25 Dec 2023 23:29), Max: 37.4 (25 Dec 2023 00:15)  T(F): 98.7 (25 Dec 2023 23:29), Max: 99.3 (25 Dec 2023 00:15)  HR: 107 (25 Dec 2023 23:00) (73 - 123)  BP: 137/64 (25 Dec 2023 22:00) (115/51 - 153/66)  BP(mean): 87 (25 Dec 2023 22:00) (70 - 92)  RR: 21 (25 Dec 2023 23:00) (13 - 31)  SpO2: 100% (25 Dec 2023 23:00) (97% - 100%)    Parameters below as of 25 Dec 2023 20:00  Patient On (Oxygen Delivery Method): mask, simple face    O2 Concentration (%): 30    PHYSICAL EXAM:  GENERAL: NAD  HEAD: s/p R cranioplasty. Incision clean and dry without drainage  JOEY COMA SCORE: E-3 V-4 M-6 =13  MENTAL STATUS: Awakes to voice, sustains eye opening. Oriented to self and hospital. Follows commands on right  CRANIAL NERVES: PERRL. EOMI. Face grossly symmetric. Hearing appears intact. Speech clear  MOTOR: RUE able to bend at elbow and show two fingers. RLE wiggles toes to command, HF at least 2-3/5. LUE trace movement to peripheral noxious; LLE withdraws to noxious  SENSATION: as above to noxious    LABS:                        7.7    10.05 )-----------( 160      ( 25 Dec 2023 03:51 )             23.5     12-25    140  |  109<H>  |  36.5<H>  ----------------------------<  130<H>  3.8   |  19.0<L>  |  1.06    Ca    8.6      25 Dec 2023 13:00  Phos  2.7     12-25  Mg     2.1     12-25    TPro  5.0<L>  /  Alb  2.2<L>  /  TBili  0.3<L>  /  DBili  x   /  AST  48<H>  /  ALT  41<H>  /  AlkPhos  87  12-25    PT/INR - ( 24 Dec 2023 03:15 )   PT: 13.0 sec;   INR: 1.18 ratio      PTT - ( 24 Dec 2023 03:15 )  PTT:32.8 sec  Urinalysis Basic - ( 25 Dec 2023 13:00 )    Color: x / Appearance: x / SG: x / pH: x  Gluc: 130 mg/dL / Ketone: x  / Bili: x / Urobili: x   Blood: x / Protein: x / Nitrite: x   Leuk Esterase: x / RBC: x / WBC x   Sq Epi: x / Non Sq Epi: x / Bacteria: x    12-24 @ 07:01 - 12-25 @ 07:00  --------------------------------------------------------  IN: 1618.9 mL / OUT: 1395 mL / NET: 223.9 mL    12-25 @ 07:01  -  12-26 @ 00:08  --------------------------------------------------------  IN: 450 mL / OUT: 285 mL / NET: 165 mL    RADIOLOGY & ADDITIONAL TESTS:   INTERVAL HPI/OVERNIGHT EVENTS:  83y M with PMH HTN, CAD s/p PCI, renal cell carcinoma status post left nephrectomy, bladder CA, BPH, CHF, LBBB, vertigo,  HLD, 5.5cm AAA s/p post EVAR, paroxysmal afib on Eliquis, Plavix, and Aspirin, h/o HIT, early stage Alzheimer's dementia transferred from [place] 11/10/23 s/p fall found with multicompartmental ICH, s/p R craniectomy 11/10 and eventual cranioplasty 12/7/23. Hospital course complicated by afib with RVR, Klebsiella pneumonia, LUE DVT, multiple subsequent episodes of ICH after attempting to restart Eliquis, now with acute respiratory failure, aspiration pneumonia, shock, hematemesis. Extubated 12/25    Patient seen this AM, undergoing chest PT/respiratory treatment    Vital Signs Last 24 Hrs  T(C): 37.1 (25 Dec 2023 23:29), Max: 37.4 (25 Dec 2023 00:15)  T(F): 98.7 (25 Dec 2023 23:29), Max: 99.3 (25 Dec 2023 00:15)  HR: 107 (25 Dec 2023 23:00) (73 - 123)  BP: 137/64 (25 Dec 2023 22:00) (115/51 - 153/66)  BP(mean): 87 (25 Dec 2023 22:00) (70 - 92)  RR: 21 (25 Dec 2023 23:00) (13 - 31)  SpO2: 100% (25 Dec 2023 23:00) (97% - 100%)    Parameters below as of 25 Dec 2023 20:00  Patient On (Oxygen Delivery Method): mask, simple face    O2 Concentration (%): 30    PHYSICAL EXAM:  GENERAL: NAD  HEAD: s/p R cranioplasty. Incision clean and dry without drainage  JOEY COMA SCORE: E-4 V-4 (person only) M-6 =14  MENTAL STATUS: Eyes open sponantously. Oriented to self. Follows commands on right  CRANIAL NERVES: PERRL. EOMI. Face grossly symmetric. Hearing appears intact. Speech clear  MOTOR: RUE able to bend at elbow and show two fingers. RLE wiggles toes to command, HF at least 2-3/5. LUE trace movement to peripheral noxious; LLE withdraws to noxious  SENSATION: as above to noxious    LABS:                        7.7    10.05 )-----------( 160      ( 25 Dec 2023 03:51 )             23.5     12-25    140  |  109<H>  |  36.5<H>  ----------------------------<  130<H>  3.8   |  19.0<L>  |  1.06    Ca    8.6      25 Dec 2023 13:00  Phos  2.7     12-25  Mg     2.1     12-25    TPro  5.0<L>  /  Alb  2.2<L>  /  TBili  0.3<L>  /  DBili  x   /  AST  48<H>  /  ALT  41<H>  /  AlkPhos  87  12-25    PT/INR - ( 24 Dec 2023 03:15 )   PT: 13.0 sec;   INR: 1.18 ratio      PTT - ( 24 Dec 2023 03:15 )  PTT:32.8 sec  Urinalysis Basic - ( 25 Dec 2023 13:00 )    Color: x / Appearance: x / SG: x / pH: x  Gluc: 130 mg/dL / Ketone: x  / Bili: x / Urobili: x   Blood: x / Protein: x / Nitrite: x   Leuk Esterase: x / RBC: x / WBC x   Sq Epi: x / Non Sq Epi: x / Bacteria: x    12-24 @ 07:01 - 12-25 @ 07:00  --------------------------------------------------------  IN: 1618.9 mL / OUT: 1395 mL / NET: 223.9 mL    12-25 @ 07:01  -  12-26 @ 00:08  --------------------------------------------------------  IN: 450 mL / OUT: 285 mL / NET: 165 mL    RADIOLOGY & ADDITIONAL TESTS:   INTERVAL HPI/OVERNIGHT EVENTS:  83y M with PMH HTN, CAD s/p PCI, renal cell carcinoma status post left nephrectomy, bladder CA, BPH, CHF, LBBB, vertigo,  HLD, 5.5cm AAA s/p post EVAR, paroxysmal afib on Eliquis, Plavix, and Aspirin, h/o HIT, early stage Alzheimer's dementia transferred from [place] 11/10/23 s/p fall found with multicompartmental ICH, s/p R craniectomy 11/10 and eventual cranioplasty 12/7/23. Hospital course complicated by afib with RVR, Klebsiella pneumonia, LUE DVT, multiple subsequent episodes of ICH after attempting to restart Eliquis, now with acute respiratory failure, aspiration pneumonia, shock, hematemesis. Extubated 12/25    Patient seen this AM, undergoing chest PT/respiratory treatment    Vital Signs Last 24 Hrs  T(C): 37.1 (25 Dec 2023 23:29), Max: 37.4 (25 Dec 2023 00:15)  T(F): 98.7 (25 Dec 2023 23:29), Max: 99.3 (25 Dec 2023 00:15)  HR: 107 (25 Dec 2023 23:00) (73 - 123)  BP: 137/64 (25 Dec 2023 22:00) (115/51 - 153/66)  BP(mean): 87 (25 Dec 2023 22:00) (70 - 92)  RR: 21 (25 Dec 2023 23:00) (13 - 31)  SpO2: 100% (25 Dec 2023 23:00) (97% - 100%)    Parameters below as of 25 Dec 2023 20:00  Patient On (Oxygen Delivery Method): mask, simple face    O2 Concentration (%): 30    PHYSICAL EXAM:  GENERAL: NAD  HEAD: s/p R cranioplasty. Incision clean and dry without drainage  JOEY COMA SCORE: E-4 V-4 (person only) M-6 =14  MENTAL STATUS: Eyes open sponantously. Oriented to self. Follows commands on right  CRANIAL NERVES: PERRL. EOMI. Face grossly symmetric. Hearing appears intact. Speech clear  MOTOR: RUE able to bend at elbow and show two fingers. RLE wiggles toes to command, HF at least 2-3/5. LUE trace movement to peripheral noxious; LLE withdraws to noxious  SENSATION: as above to noxious    LABS:                        7.7    10.05 )-----------( 160      ( 25 Dec 2023 03:51 )             23.5     12-25    140  |  109<H>  |  36.5<H>  ----------------------------<  130<H>  3.8   |  19.0<L>  |  1.06    Ca    8.6      25 Dec 2023 13:00  Phos  2.7     12-25  Mg     2.1     12-25    TPro  5.0<L>  /  Alb  2.2<L>  /  TBili  0.3<L>  /  DBili  x   /  AST  48<H>  /  ALT  41<H>  /  AlkPhos  87  12-25    PT/INR - ( 24 Dec 2023 03:15 )   PT: 13.0 sec;   INR: 1.18 ratio      PTT - ( 24 Dec 2023 03:15 )  PTT:32.8 sec  Urinalysis Basic - ( 25 Dec 2023 13:00 )    Color: x / Appearance: x / SG: x / pH: x  Gluc: 130 mg/dL / Ketone: x  / Bili: x / Urobili: x   Blood: x / Protein: x / Nitrite: x   Leuk Esterase: x / RBC: x / WBC x   Sq Epi: x / Non Sq Epi: x / Bacteria: x    12-24 @ 07:01 - 12-25 @ 07:00  --------------------------------------------------------  IN: 1618.9 mL / OUT: 1395 mL / NET: 223.9 mL    12-25 @ 07:01  -  12-26 @ 00:08  --------------------------------------------------------  IN: 450 mL / OUT: 285 mL / NET: 165 mL    RADIOLOGY & ADDITIONAL TESTS:

## 2023-12-26 NOTE — CHART NOTE - NSCHARTNOTEFT_GEN_A_CORE
82 y/o male w/PMHx of HTN, CAD s/p PCI, RCC s/p L Nephrectomy, Bladder CA, CHF, LBBB, AAA s/p EVAR, pAfib, early stage Alzheimer's initially admitted to Neurosurgical ICU for SDH after a fall at home 11/10/23 s/p hemicraniectomy. Downgraded 12/17 to SDU, MICU was consulted as pt developed altered mental status and was in respiratory distress w/vomiting. Concern for aspiration as pt had coffee ground material suctioned prior to intubation and with OGT suction. CTA abd/pelv ordered to r/o mesenteric ischemia, with surgery consulted as abdomen was distended. No acute surgical findings. Chronic endovascular leak which vascular surgery was consulted for, with no acute intervention planned. Pt initially on vasopressin and phenylephrine for vasopressor support. Blood cultures 12/22 positive for Candida Glabrata. Sputum cultures positive for Klebsiella. Transfused 2 units pRBCs two days prior, stable Hb since. Pt able to be weaned off pressor support and extubated yday.    ID recommending capsofungin in addition to Meropenem. Neuro status stable with neurosx following. Protonix gtt transitioned to IV BID with trickle feeds. Requiring potassium and phosphate repletion secondary to diarrhea. Digoxin added for rate control of atrial fibrillation, resistant to IVF + lopressor pushes. Stable at this time ready for downgrade to SDU.

## 2023-12-26 NOTE — CONSULT NOTE ADULT - SUBJECTIVE AND OBJECTIVE BOX
HPI:  82y M with PMH HTN, CAD s/p stents, renal cell carcinoma status post left nephrectomy, bladder CA, BPH, CHF, LBBB, vertigo,  HLD, 5.5cm AAA without rupture post EVAR, paroxysmal A-fib on Eliquis, Plavix, and aspirin, early stage Alzheimer dementia transferred from [facility] s/p unwitnessed fall with head strike at home found by wife on floor at 8am. Patient brought to urgent care by wife and had 5 staples to posterior scalp but was instructed to go to nearest ED.  CT head at [place] showed R frontal IPH, SDH, SAH at 9:00. CTA stroke protocol not performed at [facility]. Patient BIB on 6L NC.  Pt GCS 15 on arrival, A&Ox2 on arrival. After initial assessment, patient noted to be vomiting enroute to CT scanner.          (10 Nov 2023 11:04)      PERTINENT PMH REVIEWED: Yes No    PAST MEDICAL & SURGICAL HISTORY:  HTN  dx       Bladder Cancer (ICD9 188.9)  TCC, dx       Hyperlipemia (ICD9 272.4)  dx       Lt Renal Neoplasm  left - s/p left nephrectomy      Hx antineoplastic chemotherapy (BCG )      Left bundle branch block      Vertigo      Heel spur, left      Abdominal aortic aneurysm (AAA)  5.5 cm      BPH (benign prostatic hyperplasia)      Renal mass, right  current - following with Dr Pamela SHEARER (coronary artery disease)      Bladder cancer, primary, with metastasis from bladder to other site  Left kidney      Acute renal failure, unspecified acute renal failure type      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)      Abdominal aortic aneurysm (AAA) without rupture      Congestive heart failure, unspecified chronicity, unspecified heart failure type      Hypertension      History of abdominal aortic aneurysm (AAA)      CAD (coronary artery disease)      Alzheimers disease      urethral Stent 2008  stent placement and removal      S/P Cystoscopy  bladder polyps removal multiple times (since ), 6/10 , 10/2011 & 10/17/2011, , 2012, last 13, 2013, 2014      S/P Tonsillectomy      History of Lt  Nephrectomy  6/10 - left      History of cystoscopy  2015      H/O abdominal aortic aneurysm repair      S/P AAA repair      History of nephrectomy  Left      Presence of stent in LAD coronary artery      History of heart artery stent  DIONICIO          SOCIAL HISTORY:                      Substance history:                    Admitted from:  home  SNF  [facility]                     Voodoo/spirituality:                    Cultural concerns:                      Surrogate/HCP/Guardian: Phone#:    FAMILY HISTORY:  Family history of MI (myocardial infarction) (Father, Mother)  father  74y/o MI, mother  96y/o alzheimers    Family history of early CAD (Father, Mother)        Allergies    penicillin (Rash)  heparin (Other (Severe))  penicillin (Hives)  Cipro (Other)  Levaquin (Other)  heparin (Other)    Intolerances        ADVANCE DIRECTIVES/TREATMENT PREFERENCES:  Full code, all aggressive measures desired   DNR/DNI - MOLST, continue all other medical treatments  DNR/DNI - MOLST, comfort measures only     Baseline ADLs (prior to admission):  Independent/ Dependent      Karnofsky/Palliative Performance Status Version 2:  %  http://npcrc.org/files/news/palliative_performance_scale_ppsv2.pdf    Present Symptoms:     Dyspnea: Mild Moderate Severe  Nausea/Vomiting: Yes No  Anxiety:  Yes No  Depression: Yes No  Fatigue: Yes No  Loss of appetite: Yes No  Constipation:     Pain: Yes No            Character-            Duration-            Effect-            Factors-            Frequency-            Location-            Severity-    Pain AD Score:  [email or web address]/painad.pdf (press ctrl + left click to view)    Review of Systems: Reviewed                     Negative:                     Positive:  Unable to obtain due to poor mentation   All others negative    MEDICATIONS  (STANDING):  atorvastatin 80 milliGRAM(s) Oral at bedtime  caspofungin IVPB      chlorhexidine 2% Cloths 1 Application(s) Topical <User Schedule>  doxazosin 2 milliGRAM(s) Oral at bedtime  folic acid 1 milliGRAM(s) Oral daily  levETIRAcetam   Injectable 1000 milliGRAM(s) IV Push every 12 hours  meropenem Injectable 1000 milliGRAM(s) IV Push every 12 hours  metoprolol tartrate Injectable 5 milliGRAM(s) IV Push every 6 hours  Nephro-roma 1 Tablet(s) Oral daily  pantoprazole  Injectable 40 milliGRAM(s) IV Push two times a day    MEDICATIONS  (PRN):  acetaminophen     Tablet .. 650 milliGRAM(s) Oral every 6 hours PRN Temp greater or equal to 38C (100.4F), Mild Pain (1 - 3)  artificial tears (preservative free) Ophthalmic Solution 1 Drop(s) Both EYES every 6 hours PRN Dry Eyes      PHYSICAL EXAM:    Vital Signs Last 24 Hrs  T(C): 37.4 (26 Dec 2023 11:18), Max: 37.4 (25 Dec 2023 20:30)  T(F): 99.3 (26 Dec 2023 11:18), Max: 99.3 (25 Dec 2023 20:30)  HR: 96 (26 Dec 2023 12:00) (89 - 127)  BP: 155/75 (26 Dec 2023 12:00) (126/92 - 157/68)  BP(mean): 98 (26 Dec 2023 12:00) (87 - 101)  RR: 23 (26 Dec 2023 12:00) (16 - 31)  SpO2: 96% (26 Dec 2023 12:00) (94% - 100%)    Parameters below as of 26 Dec 2023 07:30  Patient On (Oxygen Delivery Method): nasal cannula  O2 Flow (L/min): 2      General: alert  oriented x ____ lethargic agitated delirious                  cachexia  nonverbal  coma    HEENT: normal  dry mouth  ET tube/trach    Lungs: comfortable tachypnea/labored breathing  excessive secretions    CV: normal  tachycardia    GI: normal  distended  tender  no BS               PEG/NG/OG tube  constipation  last BM:     : normal  incontinent  oliguria/anuria  hall    MSK: normal  weakness  edema             ambulatory  bedbound/wheelchair bound    Neuro: no focal deficits cognitive impairment dysphagia dysarthria hemiplegia     Skin: normal  pressure ulcers- Stage_____  no rash    LABS:                        9.0    10.45 )-----------( 138      ( 26 Dec 2023 03:38 )             25.1     12-    144  |  112<H>  |  31.3<H>  ----------------------------<  86  2.9<LL>   |  20.0<L>  |  1.02    Ca    8.5      26 Dec 2023 03:38  Phos  1.7       Mg     1.8         TPro  4.8<L>  /  Alb  2.1<L>  /  TBili  0.2<L>  /  DBili  x   /  AST  43<H>  /  ALT  30  /  AlkPhos  83  12-26      Urinalysis Basic - ( 26 Dec 2023 03:38 )    Color: x / Appearance: x / SG: x / pH: x  Gluc: 86 mg/dL / Ketone: x  / Bili: x / Urobili: x   Blood: x / Protein: x / Nitrite: x   Leuk Esterase: x / RBC: x / WBC x   Sq Epi: x / Non Sq Epi: x / Bacteria: x      I&O's Summary    25 Dec 2023 07:  -  26 Dec 2023 07:00  --------------------------------------------------------  IN: 775 mL / OUT: 1175 mL / NET: -400 mL    26 Dec 2023 07:01  -  26 Dec 2023 13:27  --------------------------------------------------------  IN: 716.5 mL / OUT: 550 mL / NET: 166.5 mL        RADIOLOGY & ADDITIONAL STUDIES:       HPI:  82y M with PMH HTN, CAD s/p stents, renal cell carcinoma status post left nephrectomy, bladder CA, BPH, CHF, LBBB, vertigo,  HLD, 5.5cm AAA without rupture post EVAR, paroxysmal A-fib on Eliquis, Plavix, and aspirin, early stage Alzheimer dementia transferred from [facility] s/p unwitnessed fall with head strike at home found by wife on floor at 8am. Patient brought to urgent care by wife and had 5 staples to posterior scalp but was instructed to go to nearest ED.  CT head at [place] showed R frontal IPH, SDH, SAH at 9:00. CTA stroke protocol not performed at [facility]. Patient BIB on 6L NC.  Pt GCS 15 on arrival, A&Ox2 on arrival. After initial assessment, patient noted to be vomiting enroute to CT scanner.          (10 Nov 2023 11:04)      PERTINENT PMH REVIEWED: Yes No    PAST MEDICAL & SURGICAL HISTORY:  HTN  dx       Bladder Cancer (ICD9 188.9)  TCC, dx       Hyperlipemia (ICD9 272.4)  dx       Lt Renal Neoplasm  left - s/p left nephrectomy      Hx antineoplastic chemotherapy (BCG )      Left bundle branch block      Vertigo      Heel spur, left      Abdominal aortic aneurysm (AAA)  5.5 cm      BPH (benign prostatic hyperplasia)      Renal mass, right  current - following with Dr Pamela SHEARER (coronary artery disease)      Bladder cancer, primary, with metastasis from bladder to other site  Left kidney      Acute renal failure, unspecified acute renal failure type      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)      Abdominal aortic aneurysm (AAA) without rupture      Congestive heart failure, unspecified chronicity, unspecified heart failure type      Hypertension      History of abdominal aortic aneurysm (AAA)      CAD (coronary artery disease)      Alzheimers disease      urethral Stent 2008  stent placement and removal      S/P Cystoscopy  bladder polyps removal multiple times (since ), 6/10 , 10/2011 & 10/17/2011, , 2012, last 13, 2013, 2014      S/P Tonsillectomy      History of Lt  Nephrectomy  6/10 - left      History of cystoscopy  2015      H/O abdominal aortic aneurysm repair      S/P AAA repair      History of nephrectomy  Left      Presence of stent in LAD coronary artery      History of heart artery stent  DIONICIO          SOCIAL HISTORY:                      Substance history:                    Admitted from:  home  SNF  [facility]                     Roman Catholic/spirituality:                    Cultural concerns:                      Surrogate/HCP/Guardian: Phone#:    FAMILY HISTORY:  Family history of MI (myocardial infarction) (Father, Mother)  father  76y/o MI, mother  94y/o alzheimers    Family history of early CAD (Father, Mother)        Allergies    penicillin (Rash)  heparin (Other (Severe))  penicillin (Hives)  Cipro (Other)  Levaquin (Other)  heparin (Other)    Intolerances        ADVANCE DIRECTIVES/TREATMENT PREFERENCES:  Full code, all aggressive measures desired   DNR/DNI - MOLST, continue all other medical treatments  DNR/DNI - MOLST, comfort measures only     Baseline ADLs (prior to admission):  Independent/ Dependent      Karnofsky/Palliative Performance Status Version 2:  %  http://npcrc.org/files/news/palliative_performance_scale_ppsv2.pdf    Present Symptoms:     Dyspnea: Mild Moderate Severe  Nausea/Vomiting: Yes No  Anxiety:  Yes No  Depression: Yes No  Fatigue: Yes No  Loss of appetite: Yes No  Constipation:     Pain: Yes No            Character-            Duration-            Effect-            Factors-            Frequency-            Location-            Severity-    Pain AD Score:  [email or web address]/painad.pdf (press ctrl + left click to view)    Review of Systems: Reviewed                     Negative:                     Positive:  Unable to obtain due to poor mentation   All others negative    MEDICATIONS  (STANDING):  atorvastatin 80 milliGRAM(s) Oral at bedtime  caspofungin IVPB      chlorhexidine 2% Cloths 1 Application(s) Topical <User Schedule>  doxazosin 2 milliGRAM(s) Oral at bedtime  folic acid 1 milliGRAM(s) Oral daily  levETIRAcetam   Injectable 1000 milliGRAM(s) IV Push every 12 hours  meropenem Injectable 1000 milliGRAM(s) IV Push every 12 hours  metoprolol tartrate Injectable 5 milliGRAM(s) IV Push every 6 hours  Nephro-roma 1 Tablet(s) Oral daily  pantoprazole  Injectable 40 milliGRAM(s) IV Push two times a day    MEDICATIONS  (PRN):  acetaminophen     Tablet .. 650 milliGRAM(s) Oral every 6 hours PRN Temp greater or equal to 38C (100.4F), Mild Pain (1 - 3)  artificial tears (preservative free) Ophthalmic Solution 1 Drop(s) Both EYES every 6 hours PRN Dry Eyes      PHYSICAL EXAM:    Vital Signs Last 24 Hrs  T(C): 37.4 (26 Dec 2023 11:18), Max: 37.4 (25 Dec 2023 20:30)  T(F): 99.3 (26 Dec 2023 11:18), Max: 99.3 (25 Dec 2023 20:30)  HR: 96 (26 Dec 2023 12:00) (89 - 127)  BP: 155/75 (26 Dec 2023 12:00) (126/92 - 157/68)  BP(mean): 98 (26 Dec 2023 12:00) (87 - 101)  RR: 23 (26 Dec 2023 12:00) (16 - 31)  SpO2: 96% (26 Dec 2023 12:00) (94% - 100%)    Parameters below as of 26 Dec 2023 07:30  Patient On (Oxygen Delivery Method): nasal cannula  O2 Flow (L/min): 2      General: alert  oriented x ____ lethargic agitated delirious                  cachexia  nonverbal  coma    HEENT: normal  dry mouth  ET tube/trach    Lungs: comfortable tachypnea/labored breathing  excessive secretions    CV: normal  tachycardia    GI: normal  distended  tender  no BS               PEG/NG/OG tube  constipation  last BM:     : normal  incontinent  oliguria/anuria  hall    MSK: normal  weakness  edema             ambulatory  bedbound/wheelchair bound    Neuro: no focal deficits cognitive impairment dysphagia dysarthria hemiplegia     Skin: normal  pressure ulcers- Stage_____  no rash    LABS:                        9.0    10.45 )-----------( 138      ( 26 Dec 2023 03:38 )             25.1     12-    144  |  112<H>  |  31.3<H>  ----------------------------<  86  2.9<LL>   |  20.0<L>  |  1.02    Ca    8.5      26 Dec 2023 03:38  Phos  1.7       Mg     1.8         TPro  4.8<L>  /  Alb  2.1<L>  /  TBili  0.2<L>  /  DBili  x   /  AST  43<H>  /  ALT  30  /  AlkPhos  83  12-26      Urinalysis Basic - ( 26 Dec 2023 03:38 )    Color: x / Appearance: x / SG: x / pH: x  Gluc: 86 mg/dL / Ketone: x  / Bili: x / Urobili: x   Blood: x / Protein: x / Nitrite: x   Leuk Esterase: x / RBC: x / WBC x   Sq Epi: x / Non Sq Epi: x / Bacteria: x      I&O's Summary    25 Dec 2023 07:  -  26 Dec 2023 07:00  --------------------------------------------------------  IN: 775 mL / OUT: 1175 mL / NET: -400 mL    26 Dec 2023 07:01  -  26 Dec 2023 13:27  --------------------------------------------------------  IN: 716.5 mL / OUT: 550 mL / NET: 166.5 mL        RADIOLOGY & ADDITIONAL STUDIES:       HPI:  82y M with PMH HTN, CAD s/p stents, renal cell carcinoma status post left nephrectomy, bladder CA, BPH, CHF, LBBB, vertigo,  HLD, 5.5cm AAA without rupture post EVAR, paroxysmal A-fib on Eliquis, Plavix, and aspirin, early stage Alzheimer dementia transferred from [facility] s/p unwitnessed fall with head strike at home found by wife on floor at 8am. Patient brought to urgent care by wife and had 5 staples to posterior scalp but was instructed to go to nearest ED.  CT head at [place] showed R frontal IPH, SDH, SAH at 9:00. CTA stroke protocol not performed at [facility]. Patient BIB on 6L NC.  Pt GCS 15 on arrival, A&Ox2 on arrival. After initial assessment, patient noted to be vomiting enroute to CT scanner.    (10 Nov 2023 11:04)    82M with past medical history as listed admitted 11/10 with prolonged hospitalization here. Initially admitted as a transfer from [facility] after fall and being found to have right frontal IPH, 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  Extubated . Course complicated by Acute Kidney Injury, afib, EEG with epileptiform discharges. Patient was trasnferred to step down unit . TTE had shown possible vegetation on valve but repeat was not diagnostic and blood cultures were negative. Course further complicated by deep vein thrombosis or LUE brachial veins. Patient course further complicated by stroke found on MRI, WHIT recommended but family declined. He subsequently underwent cranioplasty on , drain removed 12/10. He had worsening of brain bleed 12/15, taken off anticoagulation and reversed. Patient then had acute [id number] on  for worsening mental status, vomiting, aspiration, and shock, requiring transfer to Intensive care unit for intubation, and pressors. Further complications including acute blood loss anemia requiring PRBCs, klebsiella pnuemonia, afib with RVR. He is now s/p extubation .  Palliative consulted for complex medical decision making in the setting of likely life-limiting illness.     PERTINENT PMH REVIEWED: Yes     PAST MEDICAL & SURGICAL HISTORY:  HTN  dx       Bladder Cancer (ICD9 188.9)  TCC, dx       Hyperlipemia (ICD9 272.4)  dx       Lt Renal Neoplasm  left - s/p left nephrectomy      Hx antineoplastic chemotherapy (BCG )      Left bundle branch block      Vertigo      Heel spur, left      Abdominal aortic aneurysm (AAA)  5.5 cm      BPH (benign prostatic hyperplasia)      Renal mass, right  current - following with Dr Santiago      CAD (coronary artery disease)      Bladder cancer, primary, with metastasis from bladder to other site  Left kidney      Acute renal failure, unspecified acute renal failure type      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)      Abdominal aortic aneurysm (AAA) without rupture      Congestive heart failure, unspecified chronicity, unspecified heart failure type      Hypertension      History of abdominal aortic aneurysm (AAA)      CAD (coronary artery disease)      Alzheimers disease      urethral Stent 2008  stent placement and removal      S/P Cystoscopy  bladder polyps removal multiple times (since ), 6/10 , 10/2011 & 10/17/2011, , 2012, last 13, 2013, 2014      S/P Tonsillectomy      History of Lt  Nephrectomy  6/10 - left      History of cystoscopy  2015      H/O abdominal aortic aneurysm repair      S/P AAA repair      History of nephrectomy  Left      Presence of stent in LAD coronary artery      History of heart artery stent  DIONICIO      SOCIAL HISTORY:                      Substance history:                    Admitted from:  home  [facility]                     Roman Catholic/spirituality:                    Cultural concerns:                      Surrogate - daughters Jennifer , and Angela      FAMILY HISTORY:  Family history of MI (myocardial infarction) (Father, Mother)  father  74y/o MI, mother  96y/o alzheimers    Family history of early CAD (Father, Mother)    Allergies    penicillin (Rash)  heparin (Other (Severe))  penicillin (Hives)  Cipro (Other)  Levaquin (Other)  heparin (Other)    Intolerances        ADVANCE DIRECTIVES/TREATMENT PREFERENCES:  Full code, all aggressive measures desired   DNR/DNI - MOLST, continue all other medical treatments  DNR/DNI - MOLST, comfort measures only     Baseline ADLs (prior to admission):  Independent/ Dependent      Karnofsky/Palliative Performance Status Version 2:  %  http://npcrc.org/files/news/palliative_performance_scale_ppsv2.pdf    Present Symptoms:     Dyspnea: Mild Moderate Severe  Nausea/Vomiting: Yes No  Anxiety:  Yes No  Depression: Yes No  Fatigue: Yes No  Loss of appetite: Yes No  Constipation:     Pain: Yes No            Character-            Duration-            Effect-            Factors-            Frequency-            Location-            Severity-    Pain AD Score:  [email or web address]/painad.pdf (press ctrl + left click to view)    Review of Systems: Reviewed                     Negative:                     Positive:  Unable to obtain due to poor mentation   All others negative    MEDICATIONS  (STANDING):  atorvastatin 80 milliGRAM(s) Oral at bedtime  caspofungin IVPB      chlorhexidine 2% Cloths 1 Application(s) Topical <User Schedule>  doxazosin 2 milliGRAM(s) Oral at bedtime  folic acid 1 milliGRAM(s) Oral daily  levETIRAcetam   Injectable 1000 milliGRAM(s) IV Push every 12 hours  meropenem Injectable 1000 milliGRAM(s) IV Push every 12 hours  metoprolol tartrate Injectable 5 milliGRAM(s) IV Push every 6 hours  Nephro-roma 1 Tablet(s) Oral daily  pantoprazole  Injectable 40 milliGRAM(s) IV Push two times a day    MEDICATIONS  (PRN):  acetaminophen     Tablet .. 650 milliGRAM(s) Oral every 6 hours PRN Temp greater or equal to 38C (100.4F), Mild Pain (1 - 3)  artificial tears (preservative free) Ophthalmic Solution 1 Drop(s) Both EYES every 6 hours PRN Dry Eyes      PHYSICAL EXAM:    Vital Signs Last 24 Hrs  T(C): 37.4 (26 Dec 2023 11:18), Max: 37.4 (25 Dec 2023 20:30)  T(F): 99.3 (26 Dec 2023 11:18), Max: 99.3 (25 Dec 2023 20:30)  HR: 96 (26 Dec 2023 12:00) (89 - 127)  BP: 155/75 (26 Dec 2023 12:00) (126/92 - 157/68)  BP(mean): 98 (26 Dec 2023 12:00) (87 - 101)  RR: 23 (26 Dec 2023 12:00) (16 - 31)  SpO2: 96% (26 Dec 2023 12:00) (94% - 100%)    Parameters below as of 26 Dec 2023 07:30  Patient On (Oxygen Delivery Method): nasal cannula  O2 Flow (L/min): 2      General: alert  oriented x ____ lethargic agitated delirious                  cachexia  nonverbal  coma    HEENT: normal  dry mouth  ET tube/trach    Lungs: comfortable tachypnea/labored breathing  excessive secretions    CV: normal  tachycardia    GI: normal  distended  tender  no BS               PEG/NG/OG tube  constipation  last BM:     : normal  incontinent  oliguria/anuria  hall    MSK: normal  weakness  edema             ambulatory  bedbound/wheelchair bound    Neuro: no focal deficits cognitive impairment dysphagia dysarthria hemiplegia     Skin: normal  pressure ulcers- Stage_____  no rash    LABS:                        9.0    10.45 )-----------( 138      ( 26 Dec 2023 03:38 )             25.1     12    144  |  112<H>  |  31.3<H>  ----------------------------<  86  2.9<LL>   |  20.0<L>  |  1.02    Ca    8.5      26 Dec 2023 03:38  Phos  1.7       Mg     1.8         TPro  4.8<L>  /  Alb  2.1<L>  /  TBili  0.2<L>  /  DBili  x   /  AST  43<H>  /  ALT  30  /  AlkPhos  83  12      Urinalysis Basic - ( 26 Dec 2023 03:38 )    Color: x / Appearance: x / SG: x / pH: x  Gluc: 86 mg/dL / Ketone: x  / Bili: x / Urobili: x   Blood: x / Protein: x / Nitrite: x   Leuk Esterase: x / RBC: x / WBC x   Sq Epi: x / Non Sq Epi: x / Bacteria: x      I&O's Summary    25 Dec 2023 07:  -  26 Dec 2023 07:00  --------------------------------------------------------  IN: 775 mL / OUT: 1175 mL / NET: -400 mL    26 Dec 2023 07:01  -  26 Dec 2023 13:27  --------------------------------------------------------  IN: 716.5 mL / OUT: 550 mL / NET: 166.5 mL        RADIOLOGY & ADDITIONAL STUDIES:       HPI:  82y M with PMH HTN, CAD s/p stents, renal cell carcinoma status post left nephrectomy, bladder CA, BPH, CHF, LBBB, vertigo,  HLD, 5.5cm AAA without rupture post EVAR, paroxysmal A-fib on Eliquis, Plavix, and aspirin, early stage Alzheimer dementia transferred from [facility] s/p unwitnessed fall with head strike at home found by wife on floor at 8am. Patient brought to urgent care by wife and had 5 staples to posterior scalp but was instructed to go to nearest ED.  CT head at [place] showed R frontal IPH, SDH, SAH at 9:00. CTA stroke protocol not performed at [facility]. Patient BIB on 6L NC.  Pt GCS 15 on arrival, A&Ox2 on arrival. After initial assessment, patient noted to be vomiting enroute to CT scanner.    (10 Nov 2023 11:04)    82M with past medical history as listed admitted 11/10 with prolonged hospitalization here. Initially admitted as a transfer from [facility] after fall and being found to have right frontal IPH, 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  Extubated . Course complicated by Acute Kidney Injury, afib, EEG with epileptiform discharges. Patient was trasnferred to step down unit . TTE had shown possible vegetation on valve but repeat was not diagnostic and blood cultures were negative. Course further complicated by deep vein thrombosis or LUE brachial veins. Patient course further complicated by stroke found on MRI, WHIT recommended but family declined. He subsequently underwent cranioplasty on , drain removed 12/10. He had worsening of brain bleed 12/15, taken off anticoagulation and reversed. Patient then had acute [id number] on  for worsening mental status, vomiting, aspiration, and shock, requiring transfer to Intensive care unit for intubation, and pressors. Further complications including acute blood loss anemia requiring PRBCs, klebsiella pnuemonia, afib with RVR. He is now s/p extubation .  Palliative consulted for complex medical decision making in the setting of likely life-limiting illness.     PERTINENT PMH REVIEWED: Yes     PAST MEDICAL & SURGICAL HISTORY:  HTN  dx       Bladder Cancer (ICD9 188.9)  TCC, dx       Hyperlipemia (ICD9 272.4)  dx       Lt Renal Neoplasm  left - s/p left nephrectomy      Hx antineoplastic chemotherapy (BCG )      Left bundle branch block      Vertigo      Heel spur, left      Abdominal aortic aneurysm (AAA)  5.5 cm      BPH (benign prostatic hyperplasia)      Renal mass, right  current - following with Dr Santiago      CAD (coronary artery disease)      Bladder cancer, primary, with metastasis from bladder to other site  Left kidney      Acute renal failure, unspecified acute renal failure type      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)      Abdominal aortic aneurysm (AAA) without rupture      Congestive heart failure, unspecified chronicity, unspecified heart failure type      Hypertension      History of abdominal aortic aneurysm (AAA)      CAD (coronary artery disease)      Alzheimers disease      urethral Stent 2008  stent placement and removal      S/P Cystoscopy  bladder polyps removal multiple times (since ), 6/10 , 10/2011 & 10/17/2011, , 2012, last 13, 2013, 2014      S/P Tonsillectomy      History of Lt  Nephrectomy  6/10 - left      History of cystoscopy  2015      H/O abdominal aortic aneurysm repair      S/P AAA repair      History of nephrectomy  Left      Presence of stent in LAD coronary artery      History of heart artery stent  DIONICIO      SOCIAL HISTORY:                      Substance history:                    Admitted from:  home  [facility]                     [employer]/spirituality:                    Cultural concerns:                      Surrogate - daughters Jennifer , and Angela      FAMILY HISTORY:  Family history of MI (myocardial infarction) (Father, Mother)  father  74y/o MI, mother  96y/o alzheimers    Family history of early CAD (Father, Mother)    Allergies    penicillin (Rash)  heparin (Other (Severe))  penicillin (Hives)  Cipro (Other)  Levaquin (Other)  heparin (Other)    Intolerances        ADVANCE DIRECTIVES/TREATMENT PREFERENCES:  Full code, all aggressive measures desired   DNR/DNI - MOLST, continue all other medical treatments  DNR/DNI - MOLST, comfort measures only     Baseline ADLs (prior to admission):  Independent/ Dependent      Karnofsky/Palliative Performance Status Version 2:  %  http://npcrc.org/files/news/palliative_performance_scale_[email or web address].pdf    Present Symptoms:     Dyspnea: Mild Moderate Severe  Nausea/Vomiting: Yes No  Anxiety:  Yes No  Depression: Yes No  Fatigue: Yes No  Loss of appetite: Yes No  Constipation:     Pain: Yes No            Character-            Duration-            Effect-            Factors-            Frequency-            Location-            Severity-    Pain AD Score:  [email or web address]/[email or web address] (press ctrl + left click to view)    Review of Systems: Reviewed                     Negative:                     Positive:  Unable to obtain due to poor mentation   All others negative    MEDICATIONS  (STANDING):  atorvastatin 80 milliGRAM(s) Oral at bedtime  caspofungin IVPB      chlorhexidine 2% Cloths 1 Application(s) Topical <User Schedule>  doxazosin 2 milliGRAM(s) Oral at bedtime  folic acid 1 milliGRAM(s) Oral daily  levETIRAcetam   Injectable 1000 milliGRAM(s) IV Push every 12 hours  meropenem Injectable 1000 milliGRAM(s) IV Push every 12 hours  metoprolol tartrate Injectable 5 milliGRAM(s) IV Push every 6 hours  Nephro-roma 1 Tablet(s) Oral daily  pantoprazole  Injectable 40 milliGRAM(s) IV Push two times a day    MEDICATIONS  (PRN):  acetaminophen     Tablet .. 650 milliGRAM(s) Oral every 6 hours PRN Temp greater or equal to 38C (100.4F), Mild Pain (1 - 3)  artificial tears (preservative free) Ophthalmic Solution 1 Drop(s) Both EYES every 6 hours PRN Dry Eyes      PHYSICAL EXAM:    Vital Signs Last 24 Hrs  T(C): 37.4 (26 Dec 2023 11:18), Max: 37.4 (25 Dec 2023 20:30)  T(F): 99.3 (26 Dec 2023 11:18), Max: 99.3 (25 Dec 2023 20:30)  HR: 96 (26 Dec 2023 12:00) (89 - 127)  BP: 155/75 (26 Dec 2023 12:00) (126/92 - 157/68)  BP(mean): 98 (26 Dec 2023 12:00) (87 - 101)  RR: 23 (26 Dec 2023 12:00) (16 - 31)  SpO2: 96% (26 Dec 2023 12:00) (94% - 100%)    Parameters below as of 26 Dec 2023 07:30  Patient On (Oxygen Delivery Method): nasal cannula  O2 Flow (L/min): 2      General: alert  oriented x ____ lethargic agitated delirious                  cachexia  nonverbal  coma    HEENT: normal  dry mouth  ET tube/trach    Lungs: comfortable tachypnea/labored breathing  excessive secretions    CV: normal  tachycardia    GI: normal  distended  tender  no BS               PEG/NG/OG tube  constipation  last BM:     : normal  incontinent  oliguria/anuria  hall    MSK: normal  weakness  edema             ambulatory  bedbound/wheelchair bound    Neuro: no focal deficits cognitive impairment dysphagia dysarthria hemiplegia     Skin: normal  pressure ulcers- Stage_____  no rash    LABS:                        9.0    10.45 )-----------( 138      ( 26 Dec 2023 03:38 )             25.1     12    144  |  112<H>  |  31.3<H>  ----------------------------<  86  2.9<LL>   |  20.0<L>  |  1.02    Ca    8.5      26 Dec 2023 03:38  Phos  1.7       Mg     1.8         TPro  4.8<L>  /  Alb  2.1<L>  /  TBili  0.2<L>  /  DBili  x   /  AST  43<H>  /  ALT  30  /  AlkPhos  83  12      Urinalysis Basic - ( 26 Dec 2023 03:38 )    Color: x / Appearance: x / SG: x / pH: x  Gluc: 86 mg/dL / Ketone: x  / Bili: x / Urobili: x   Blood: x / Protein: x / Nitrite: x   Leuk Esterase: x / RBC: x / WBC x   Sq Epi: x / Non Sq Epi: x / Bacteria: x      I&O's Summary    25 Dec 2023 07:  -  26 Dec 2023 07:00  --------------------------------------------------------  IN: 775 mL / OUT: 1175 mL / NET: -400 mL    26 Dec 2023 07:01  -  26 Dec 2023 13:27  --------------------------------------------------------  IN: 716.5 mL / OUT: 550 mL / NET: 166.5 mL        RADIOLOGY & ADDITIONAL STUDIES:

## 2023-12-26 NOTE — CHART NOTE - NSCHARTNOTEFT_GEN_A_CORE
Source: Patient [ ]  Family [ ]  other [x] EMR/staff    Current Diet: No active diet order    Current Weight:   12/7 55.8 kg  11/23 65.8 kg  11/16 71.2 kg  No new weights, overall on downward trend, no edema per documentation    Pertinent Medications: MEDICATIONS  (STANDING):  folic acid 1 milliGRAM(s) Oral daily  Nephro-roma 1 Tablet(s) Oral daily  pantoprazole  Injectable 40 milliGRAM(s) IV Push two times a day  voriconazole IVPB 300 milliGRAM(s) IV Intermittent every 12 hours    Pertinent Labs: 12-26 Na144 mmol/L Glu 86 mg/dL K+ 2.9 mmol/L<LL> Cr  1.02 mg/dL BUN 31.3 mg/dL<H> Phos 1.7 mg/dL<L> Alb 2.1 g/dL<L>    Skin: R head incision, DTI R buttock, Stage 1 coccyx, Stage 2 R buttock    Nutrition focused physical exam previously conducted  - found signs of malnutrition [ ]absent [x]present    Subcutaneous fat loss: Mod [x] Orbital fat pads region, [x]Buccal fat region, [x]Triceps region, [x]Ribs region    Muscle wasting: Mod [x]Temples region, [x]Clavicle region, [x]Shoulder region, [ ]Scapula region, [ ]Interosseous region,  [ ]thigh region, [ ]Calf region    Estimated Needs:   [x] no change since previous assessment  3811-6880 kcals/day (based on 30-35 kcal/kg BW 55.8 kg)  78-89 g pro/day (based on 1.6-1.8 g/kg BW 55.8 kg)  [ ] recalculated:     Hospital Course:  83y M with PMH HTN, CAD s/p PCI, renal cell carcinoma status post left nephrectomy, bladder CA, BPH, CHF, LBBB, vertigo,  HLD, 5.5cm AAA s/p post EVAR, paroxysmal afib on Eliquis, Plavix, and Aspirin, h/o HIT, early stage Alzheimer's dementia transferred from [place] 11/10/23 s/p fall found with multicompartmental ICH, s/p R craniectomy 11/10 and eventual cranioplasty 12/7/23. Hospital course complicated by afib with RVR, Klebsiella pneumonia, LUE DVT, multiple subsequent episodes of ICH after attempting to restart Eliquis, now with acute respiratory failure, aspiration pneumonia, shock, hematemesis. Extubated 12/25.    Current Nutrition Diagnosis: Pt remains at high nutrition risk secondary to severe (acute) protein calorie malnutrition related to inability to meet sufficient protein energy needs in setting of TBI, - R frontal IPH, SDH, tSAH; s/p R decompressive hemicraniectomy 11/10 as evidenced by physical signs of mod muscle/fat loss and meeting < 50% estimated energy needs > 5days, weight loss.  S/p extubated 12/25, lethargic. Remains NPO at this time. Previously receiving TF Jevity 1.5 prior to extubation at goal rate of 50 cc/hr on 12/25. Per flow sheet, rectal tube output 100 ml today. Recommendations below:    Recommendations:   1. Swallow eval prior to diet advancement  2. If unable to advance diet, consider re-starting TF   3. Suggest to change nephro-roma to MVI daily and add Vit C 500 mg daily to aid wound healing  4. Monitor electrolytes, replete prn  5. Trend weights    Monitoring and Evaluation:   [ ] PO intake [] Tolerance to diet prescription [X] Weights  [X] Follow up per protocol [X] Labs: Source: Patient [ ]  Family [ ]  other [x] EMR/staff    Current Diet: No active diet order    Current Weight:   12/7 55.8 kg  11/23 65.8 kg  11/16 71.2 kg  No new weights, overall on downward trend, no edema per documentation    Pertinent Medications: MEDICATIONS  (STANDING):  folic acid 1 milliGRAM(s) Oral daily  Nephro-roma 1 Tablet(s) Oral daily  pantoprazole  Injectable 40 milliGRAM(s) IV Push two times a day  voriconazole IVPB 300 milliGRAM(s) IV Intermittent every 12 hours    Pertinent Labs: 12-26 Na144 mmol/L Glu 86 mg/dL K+ 2.9 mmol/L<LL> Cr  1.02 mg/dL BUN 31.3 mg/dL<H> Phos 1.7 mg/dL<L> Alb 2.1 g/dL<L>    Skin: R head incision, DTI R buttock, Stage 1 coccyx, Stage 2 R buttock    Nutrition focused physical exam previously conducted  - found signs of malnutrition [ ]absent [x]present    Subcutaneous fat loss: Mod [x] Orbital fat pads region, [x]Buccal fat region, [x]Triceps region, [x]Ribs region    Muscle wasting: Mod [x]Temples region, [x]Clavicle region, [x]Shoulder region, [ ]Scapula region, [ ]Interosseous region,  [ ]thigh region, [ ]Calf region    Estimated Needs:   [x] no change since previous assessment  [id number] kcals/day (based on 30-35 kcal/kg BW 55.8 kg)  78-89 g pro/day (based on 1.6-1.8 g/kg BW 55.8 kg)  [ ] recalculated:     Hospital Course:  83y M with PMH HTN, CAD s/p PCI, renal cell carcinoma status post left nephrectomy, bladder CA, BPH, CHF, LBBB, vertigo,  HLD, 5.5cm AAA s/p post EVAR, paroxysmal afib on Eliquis, Plavix, and Aspirin, h/o HIT, early stage Alzheimer's dementia transferred from [place] 11/10/23 s/p fall found with multicompartmental ICH, s/p R craniectomy 11/10 and eventual cranioplasty 12/7/23. Hospital course complicated by afib with RVR, Klebsiella pneumonia, LUE DVT, multiple subsequent episodes of ICH after attempting to restart Eliquis, now with acute respiratory failure, aspiration pneumonia, shock, hematemesis. Extubated 12/25.    Current Nutrition Diagnosis: Pt remains at high nutrition risk secondary to severe (acute) protein calorie malnutrition related to inability to meet sufficient protein energy needs in setting of TBI, - R frontal IPH, SDH, tSAH; s/p R decompressive hemicraniectomy 11/10 as evidenced by physical signs of mod muscle/fat loss and meeting < 50% estimated energy needs > 5days, weight loss.  S/p extubated 12/25, lethargic. Remains NPO at this time. Previously receiving TF Jevity 1.5 prior to extubation at goal rate of 50 cc/hr on 12/25. Per flow sheet, rectal tube output 100 ml today. Recommendations below:    Recommendations:   1. Swallow eval prior to diet advancement  2. If unable to advance diet, consider re-starting TF   3. Suggest to change nephro-roma to MVI daily and add Vit C 500 mg daily to aid wound healing  4. Monitor electrolytes, replete prn  5. Trend weights    Monitoring and Evaluation:   [ ] PO intake [] Tolerance to diet prescription [X] Weights  [X] Follow up per protocol [X] Labs:

## 2023-12-26 NOTE — PROGRESS NOTE ADULT - SUBJECTIVE AND OBJECTIVE BOX
[facility]                                                INFECTIOUS DISEASES  =======================================================                   Urielquintin Vivas#   Roman Gonzáles MD#    Shan Wilson MD*                           Jennie Huerta MD*   Stephany Segura MD*           Diplomates American Board of Internal Medicine & Infectious Diseases                  # [place] Office - Appt - Tel  [phone] Fax [phone]                * [place] Office - Appt - Tel [phone] Fax [phone]                                  Hospital Consult line:  [phone]  =======================================================      [id number]  DEUCE BALL   follow up for: ID re-consulted for fungemia      Patient admitted with right frontal IPH, 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 , LUE DVT, CVA, s/p cranioplasty on 12/7, c/b intracranial bleed, AMS, suspected aspiration and septic shock. Patietn was intubated and ETT cx + enterobacter, BCX + c.glabrata. cta/p showed stable endoleak-no intervention per vascular    pt extubated 12/25, being transferred out of icu      pt with right fem line-removed today    + diarrhea, with rectal tube in place  patient seen and examined.       I have personally reviewed the labs and data; pertinent labs and data are listed in this note; please see below.   ===================================================  REVIEW OF SYSTEMS:  limited    =======================================================  Allergies    penicillin (Rash)  heparin (Other (Severe))  penicillin (Hives)  Cipro (Other)  Levaquin (Other)  heparin (Other)    Intolerances    Antibiotics:  caspofungin IVPB      meropenem Injectable 1000 milliGRAM(s) IV Push every 12 hours    Other medications:  atorvastatin 80 milliGRAM(s) Oral at bedtime  chlorhexidine 2% Cloths 1 Application(s) Topical <User Schedule>  doxazosin 2 milliGRAM(s) Oral at bedtime  folic acid 1 milliGRAM(s) Oral daily  levETIRAcetam   Injectable 1000 milliGRAM(s) IV Push every 12 hours  metoprolol tartrate Injectable 5 milliGRAM(s) IV Push every 6 hours  Nephro-roma 1 Tablet(s) Oral daily  pantoprazole  Injectable 40 milliGRAM(s) IV Push two times a day    ======================================================  Physical Exam:  ============  T(F): 99.3 (26 Dec 2023 11:18), Max: 99.3 (25 Dec 2023 20:30)  HR: 96 (26 Dec 2023 14:30)  BP: 143/65 (26 Dec 2023 14:00)  RR: 25 (26 Dec 2023 14:30)  SpO2: 95% (26 Dec 2023 14:30) (94% - 100%)  temp max in last 48H T(F): , Max: 99.3 (12-24-23 @ 22:30)    General:  No acute distress.  Eye: no conjunctival pallor, no scleral icterus  Neck: Supple, No lymphadenopathy.  Respiratory: Lungs are clear to auscultation, Respirations are non-labored.  Cardiovascular: Normal rate, Regular rhythm,  s1+s2  Gastrointestinal: Soft, Non-tender, Non-distended, Normal bowel sounds. rectal tube liquid stool  Integumentary: No rash.  Neurologic: Alert, Oriented, No focal deficits  Psychiatric: Appropriate mood & affect.  =======================================================  Labs:                        9.0    10.45 )-----------( 138      ( 26 Dec 2023 03:38 )             25.1     12-26    144  |  112<H>  |  31.3<H>  ----------------------------<  86  2.9<LL>   |  20.0<L>  |  1.02    Ca    8.5      26 Dec 2023 03:38  Phos  1.7     12-26  Mg     1.8     12-26    TPro  4.8<L>  /  Alb  2.1<L>  /  TBili  0.2<L>  /  DBili  x   /  AST  43<H>  /  ALT  30  /  AlkPhos  83  12-26      Culture - Blood (collected 12-25-23 @ 04:01)  Source: .Blood Blood  Preliminary Report (12-26-23 @ 10:01):    No growth at 24 hours    Culture - Blood (collected 12-25-23 @ 03:51)  Source: .Blood Blood  Preliminary Report (12-26-23 @ 10:01):    No growth at 24 hours    Culture - Blood (collected 12-22-23 @ 22:41)  Source: .Blood Blood-Peripheral  Gram Stain (12-25-23 @ 01:33):    Growth in aerobic bottle: Yeast like cells  Preliminary Report (12-25-23 @ 20:57):    Growth in aerobic bottle: Candida glabrata    Direct identification is available within approximately 3-5    hours either by Blood Panel Multiplexed PCR or Direct    MALDI-TOF. Details: [email or web address]  Organism: Blood Culture PCR (12-25-23 @ 02:54)  Organism: Blood Culture PCR (12-25-23 @ 02:54)    Sensitivities:      Method Type: PCR      -  Candida glabrata: Detec    Culture - Blood (collected 12-22-23 @ 22:30)  Source: .Blood Blood-Peripheral  Gram Stain (12-25-23 @ 11:16):    Growth in aerobic bottle: Yeast like cells  Final Report (12-26-23 @ 12:42):    Growth in aerobic bottle: Candida glabrata    See previous culture [id number]    Culture - Fungal, CSF (collected 12-21-23 @ 19:44)  Source: .CSF CSF  Preliminary Report (12-23-23 @ 15:03):    Culture is being performed. Fungal cultures are held for 4 weeks.    Culture - CSF with Gram Stain (collected 12-21-23 @ 19:44)  Source: .CSF CSF  Gram Stain (12-22-23 @ 02:07):    polymorphonuclear leukocytes seen    No organisms seen    by cytocentrifuge  Preliminary Report (12-22-23 @ 16:44):    No growth    Culture - Acid Fast - CSF (collected 12-21-23 @ 19:44)  Source: .CSF CSF  Preliminary Report (12-23-23 @ 15:08):    Culture is being performed.    Culture - Sputum (collected 12-21-23 @ 10:10)  Source: ET Tube ET Tube  Gram Stain (12-21-23 @ 23:16):    Few polymorphonuclear leukocytes per low power field    Rare Squamous epithelial cells per low power field    Moderate Yeast like cells per oil power field    Moderate Gram Positive Rods per oil power field    Few Gram positive cocci in pairs per oil powerfield    Rare Gram Negative Rods per oil power field  Final Report (12-23-23 @ 12:29):    Numerous Klebsiella aerogenes (Previously Enterobacter)    Normal Respiratory Isabell present  Organism: Klebsiella aerogenes (Previously Enterobacter) (12-23-23 @ 12:29)  Organism: Klebsiella aerogenes (Previously Enterobacter) (12-23-23 @ 12:29)    Sensitivities:      Method Type: CarbaR      -  Resistance Gene to Carbapenem: Nondet  Organism: Klebsiella aerogenes (Previously Enterobacter) (12-23-23 @ 12:29)    Sensitivities:      Method Type: ANDERS      -  Amoxicillin/Clavulanic Acid: R >16/8      -  Ampicillin: R >16 These ampicillin results predict results for amoxicillin      -  Ampicillin/Sulbactam: R 8/4      -  Aztreonam: S <=4      -  Cefazolin: R >16      -  Cefepime: S <=2      -  Cefoxitin: R >16      -  Ceftazidime/Avibactam: S <=4      -  Ceftriaxone: S <=1 Enterobacter cloacae, Klebsiella aerogenes, and Citrobacter freundii may develop resistance during prolonged therapy.      -  Ciprofloxacin: S <=0.25      -  Ertapenem: S <=0.5      -  Gentamicin: S <=2      -  Imipenem: I 2      -  Levofloxacin: S <=0.5      -  Meropenem: S <=1      -  Piperacillin/Tazobactam: S <=8      -  Tobramycin: S <=2      -  Trimethoprim/Sulfamethoxazole: S <=0.5/9.5    Culture - Blood (collected 12-20-23 @ 07:32)  Source: .Blood Blood  Final Report (12-25-23 @ 13:01):    No growth at 5 days    Culture - Blood (collected 12-20-23 @ 07:25)  Source: .Blood Blood  Final Report (12-25-23 @ 13:01):    No growth at 5 days    Culture - Blood (collected 12-10-23 @ 18:23)  Source: .Blood Blood-Peripheral  Final Report (12-15-23 @ 22:01):    No growth at 5 days    Culture - Blood (collected 12-10-23 @ 18:18)  Source: .Blood Blood-Peripheral  Final Report (12-15-23 @ 22:01):    No growth at 5 days    Culture - Sputum (collected 11-22-23 @ 00:08)  Source: ET Tube ET Tube  Gram Stain (11-22-23 @ 12:49):    Rare polymorphonuclear leukocytes per low power field    Moderate Yeast per oil power field  Final Report (11-26-23 @ 22:56):    Normal Respiratory Isabell present    Culture - Blood (collected 11-22-23 @ 00:08)  Source: .Blood Blood  Final Report (11-27-23 @ 07:01):    No growth at 5 days    Culture - Blood (collected 11-21-23 @ 23:45)  Source: .Blood Blood  Final Report (11-27-23 @ 07:01):    No growth at 5 days    Culture - Blood (collected 11-17-23 @ 11:28)  Source: .Blood Blood-Peripheral  Final Report (11-22-23 @ 15:09):    No growth at 5 days    Culture - Blood (collected 11-17-23 @ 11:26)  Source: .Blood Blood-Peripheral  Final Report (11-22-23 @ 15:09):    No growth at 5 days    Culture - Blood (collected 11-13-23 @ 07:40)  Source: .Blood Blood-Peripheral  Final Report (11-18-23 @ 14:01):    No growth at 5 days    Culture - Blood (collected 11-12-23 @ 17:55)  Source: .Blood Blood-Peripheral  Final Report (11-18-23 @ 02:00):    No growth at 5 days    Culture - Blood (collected 11-11-23 @ 16:19)  Source: .Blood Blood  Final Report (11-16-23 @ 23:00):    No growth at 5 days    Culture - Sputum (collected 11-11-23 @ 16:11)  Source: ET Tube ET Tube  Gram Stain (11-12-23 @ 11:50):    Numerous polymorphonuclear leukocytes per low power field    Few Squamous epithelial cells per low power field    Few Yeast like cells per oil power field    Numerous Gram Negative Rods per oil power field  Final Report (11-14-23 @ 08:57):    Numerous Klebsiella aerogenes (Previously Enterobacter)    Normal Respiratory Isabell present  Organism: Klebsiella aerogenes (Previously Enterobacter) (11-14-23 @ 08:57)  Organism: Klebsiella aerogenes (Previously Enterobacter) (11-14-23 @ 08:57)    Sensitivities:      Method Type: ANDERS      -  Amoxicillin/Clavulanic Acid: R >16/8      -  Ampicillin: R <=8 These ampicillin results predict results for amoxicillin      -  Ampicillin/Sulbactam: R <=4/2      -  Aztreonam: S <=4      -  Cefazolin: R >16      -  Cefepime: S <=2      -  Cefotaxime: S Enterobacter cloacae, Klebsiella aerogenes, and Citrobacter freundii may develop resistance during prolonged therapy.      -  Cefoxitin: R >16      -  Ceftazidime: S Enterobacter cloacae, Klebsiella aerogenes, and Citrobacter freundii may develop resistance during prolonged therapy.      -  Ceftriaxone: S <=1 Enterobacter cloacae, Klebsiella aerogenes, and Citrobacter freundii may develop resistance during prolonged therapy.      -  Ciprofloxacin: S <=0.25      -  Ertapenem: S <=0.5      -  Gentamicin: S <=2      -  Imipenem: S <=1      -  Levofloxacin: S <=0.5      -  Meropenem: S <=1      -  Piperacillin/Tazobactam: S <=8      -  Tobramycin: S <=2      -  Trimethoprim/Sulfamethoxazole: S <=0.5/9.5    Culture - Blood (collected 11-11-23 @ 15:50)  Source: .Blood Blood  Final Report (11-16-23 @ 23:00):    No growth at 5 days        < from: CT Angio Abdomen and Pelvis w/ IV Cont (12.21.23 @ 17:13) >  FINDINGS:    CHEST:    LUNGS AND LARGE AIRWAYS:  PLEURA:    Endotracheal tube, tip above the level michael.  The central airways are patent.    There are diffuse bilateral upper and lower lobe groundglass   centrilobular and tree-in-bud nodular opacities  There are bibasilar lower lobe airspace consolidations and left lingula   airspace consolidation.    There is a trace left pleural effusion.    VESSELS:  Atherosclerotic changes thoracic aorta with coronary artery calcification   and/or stent.  No central pulmonary embolism is noted.    HEART:   Heart size is normal.  Aortic and mitral valvular calcification. No pericardial effusion.    MEDIASTINUM AND CHERY:  No enlarged lymphadenopathy.    CHEST WALL AND LOWER NECK: Within normal limits.    ABDOMEN AND PELVIS:    LIVER: Within normal limits.  BILE DUCTS: Normal caliber.  GALLBLADDER: Dependent sludge or gallstones.  SPLEEN: Small, peripheral wedge-shaped defect posterior spleen, could   reflect infarct.  PANCREAS: Within normal limits.  ADRENALS: Within normal limits.  KIDNEYS/URETERS:  Left nephrectomy.  Multiple indeterminate right renal hypodense lesions.  No hydronephrosis.    BLADDER: Collapsed around Vallecillo catheter.  REPRODUCTIVE ORGANS: Mildly prominent prostate gland.    BOWEL:  Nasogastric tube within stomach which is nondistended.  There is a distended stomach to  Air filled colon extending to the rectum without bowel obstruction.  Rectal probe is present.  No extraluminal gas or congenital fluid collection.   PERITONEUM: No ascites.    VESSELS:  Aortobiiliac stent grafting right renal artery graft.    The native aneurysm sac measures 7.1 cm, stable from prior exam.  There is suggestion of contrast within the aneurysm sac near the proximal   stent graft attachment on arterial phase imaging, which becomes more   pronounced on delayed phase imaging (5:62, 63 and  10:114,115).    There is a dilated left common iliac artery measuring, 2 cm.  The iliac arteries are patent.    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  The superior mesenteric artery is patent.  The superior mesenteric vein and portal venous confluence is patent.    RETROPERITONEUM/LYMPH NODES:  No enlarged lymphadenopathy.  No retroperitoneal hematoma.    ABDOMINAL WALL:  Small bilateral fat-containing inguinal hernias.  Right femoral venous catheter.    BONES: Degenerative changes.    IMPRESSION:    Diffuse groundglass centrilobular and tree-in-bud nodular opacities which   is suggestive of infectious or aspiration bronchiolitis.    Bilateral lower lobe airspace consolidations may represent pneumonia   and/or atelectasis.    Findings suggestive of endoleak near the proximal stent graft attachment;   evaluation is somewhat limited without noncontrast exam.  Stable size of the aneurysm sac.    Diffuse colonic fecal retention with colonic distention.  No CT evidence for acute thromboembolic mesenteric ischemia.    Findings were discussed by telephone with Dr. Soriano at the time of   interpretation on 12/21/2023.    Other findings as discussed above.    --- End of Report ---        < end of copied text >                                              [facility]                                                INFECTIOUS DISEASES  =======================================================                   Urielquintin Vivas#   Roman Gonzáles MD#    Shan Wilson MD*                           Jennie Huerta MD*   Stephany Segura MD*           Diplomates American Board of Internal Medicine & Infectious Diseases                  # [place] Office - Appt - Tel  [phone] Fax [phone]                * [place] Office - Appt - Tel [phone] Fax [phone]                                  Hospital Consult line:  [phone]  =======================================================      [id number]  DEUCE BALL   follow up for: ID re-consulted for fungemia      Patient admitted with right frontal IPH, 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 , LUE DVT, CVA, s/p cranioplasty on 12/7, c/b intracranial bleed, AMS, suspected aspiration and septic shock. Patietn was intubated and ETT cx + enterobacter, BCX + c.glabrata. cta/p showed stable endoleak-no intervention per vascular    pt extubated 12/25, being transferred out of icu      pt with right fem line-removed today    + diarrhea, with rectal tube in place  patient seen and examined.       I have personally reviewed the labs and data; pertinent labs and data are listed in this note; please see below.   ===================================================  REVIEW OF SYSTEMS:  limited    =======================================================  Allergies    penicillin (Rash)  heparin (Other (Severe))  penicillin (Hives)  Cipro (Other)  Levaquin (Other)  heparin (Other)    Intolerances    Antibiotics:  caspofungin IVPB      meropenem Injectable 1000 milliGRAM(s) IV Push every 12 hours    Other medications:  atorvastatin 80 milliGRAM(s) Oral at bedtime  chlorhexidine 2% Cloths 1 Application(s) Topical <User Schedule>  doxazosin 2 milliGRAM(s) Oral at bedtime  folic acid 1 milliGRAM(s) Oral daily  levETIRAcetam   Injectable 1000 milliGRAM(s) IV Push every 12 hours  metoprolol tartrate Injectable 5 milliGRAM(s) IV Push every 6 hours  Nephro-roma 1 Tablet(s) Oral daily  pantoprazole  Injectable 40 milliGRAM(s) IV Push two times a day    ======================================================  Physical Exam:  ============  T(F): 99.3 (26 Dec 2023 11:18), Max: 99.3 (25 Dec 2023 20:30)  HR: 96 (26 Dec 2023 14:30)  BP: 143/65 (26 Dec 2023 14:00)  RR: 25 (26 Dec 2023 14:30)  SpO2: 95% (26 Dec 2023 14:30) (94% - 100%)  temp max in last 48H T(F): , Max: 99.3 (12-24-23 @ 22:30)    General:  No acute distress.  Eye: no conjunctival pallor, no scleral icterus  Neck: Supple, No lymphadenopathy.  Respiratory: Lungs are clear to auscultation, Respirations are non-labored.  Cardiovascular: Normal rate, Regular rhythm,  s1+s2  Gastrointestinal: Soft, Non-tender, Non-distended, Normal bowel sounds. rectal tube liquid stool  Integumentary: No rash.  Neurologic: Alert, Oriented, No focal deficits  Psychiatric: Appropriate mood & affect.  =======================================================  Labs:                        9.0    10.45 )-----------( 138      ( 26 Dec 2023 03:38 )             25.1     12-26    144  |  112<H>  |  31.3<H>  ----------------------------<  86  2.9<LL>   |  20.0<L>  |  1.02    Ca    8.5      26 Dec 2023 03:38  Phos  1.7     12-26  Mg     1.8     12-26    TPro  4.8<L>  /  Alb  2.1<L>  /  TBili  0.2<L>  /  DBili  x   /  AST  43<H>  /  ALT  30  /  AlkPhos  83  12-26      Culture - Blood (collected 12-25-23 @ 04:01)  Source: .Blood Blood  Preliminary Report (12-26-23 @ 10:01):    No growth at 24 hours    Culture - Blood (collected 12-25-23 @ 03:51)  Source: .Blood Blood  Preliminary Report (12-26-23 @ 10:01):    No growth at 24 hours    Culture - Blood (collected 12-22-23 @ 22:41)  Source: .Blood Blood-Peripheral  Gram Stain (12-25-23 @ 01:33):    Growth in aerobic bottle: Yeast like cells  Preliminary Report (12-25-23 @ 20:57):    Growth in aerobic bottle: Candida glabrata    Direct identification is available within approximately 3-5    hours either by Blood Panel Multiplexed PCR or Direct    MALDI-TOF. Details: [email or web address]  Organism: Blood Culture PCR (12-25-23 @ 02:54)  Organism: Blood Culture PCR (12-25-23 @ 02:54)    Sensitivities:      Method Type: PCR      -  Candida glabrata: Detec    Culture - Blood (collected 12-22-23 @ 22:30)  Source: .Blood Blood-Peripheral  Gram Stain (12-25-23 @ 11:16):    Growth in aerobic bottle: Yeast like cells  Final Report (12-26-23 @ 12:42):    Growth in aerobic bottle: Candida glabrata    See previous culture [id number]    Culture - Fungal, CSF (collected 12-21-23 @ 19:44)  Source: .CSF CSF  Preliminary Report (12-23-23 @ 15:03):    Culture is being performed. Fungal cultures are held for 4 weeks.    Culture - CSF with Gram Stain (collected 12-21-23 @ 19:44)  Source: .CSF CSF  Gram Stain (12-22-23 @ 02:07):    polymorphonuclear leukocytes seen    No organisms seen    by cytocentrifuge  Preliminary Report (12-22-23 @ 16:44):    No growth    Culture - Acid Fast - CSF (collected 12-21-23 @ 19:44)  Source: .CSF CSF  Preliminary Report (12-23-23 @ 15:08):    Culture is being performed.    Culture - Sputum (collected 12-21-23 @ 10:10)  Source: ET Tube ET Tube  Gram Stain (12-21-23 @ 23:16):    Few polymorphonuclear leukocytes per low power field    Rare Squamous epithelial cells per low power field    Moderate Yeast like cells per oil power field    Moderate Gram Positive Rods per oil power field    Few Gram positive cocci in pairs per oil powerfield    Rare Gram Negative Rods per oil power field  Final Report (12-23-23 @ 12:29):    Numerous Klebsiella aerogenes (Previously Enterobacter)    Normal Respiratory Isabell present  Organism: Klebsiella aerogenes (Previously Enterobacter) (12-23-23 @ 12:29)  Organism: Klebsiella aerogenes (Previously Enterobacter) (12-23-23 @ 12:29)    Sensitivities:      Method Type: CarbaR      -  Resistance Gene to Carbapenem: Nondet  Organism: Klebsiella aerogenes (Previously Enterobacter) (12-23-23 @ 12:29)    Sensitivities:      Method Type: ANDERS      -  Amoxicillin/Clavulanic Acid: R >16/8      -  Ampicillin: R >16 These ampicillin results predict results for amoxicillin      -  Ampicillin/Sulbactam: R 8/4      -  Aztreonam: S <=4      -  Cefazolin: R >16      -  Cefepime: S <=2      -  Cefoxitin: R >16      -  Ceftazidime/Avibactam: S <=4      -  Ceftriaxone: S <=1 Enterobacter cloacae, Klebsiella aerogenes, and Citrobacter freundii may develop resistance during prolonged therapy.      -  Ciprofloxacin: S <=0.25      -  Ertapenem: S <=0.5      -  Gentamicin: S <=2      -  Imipenem: I 2      -  Levofloxacin: S <=0.5      -  Meropenem: S <=1      -  Piperacillin/Tazobactam: S <=8      -  Tobramycin: S <=2      -  Trimethoprim/Sulfamethoxazole: S <=0.5/9.5    Culture - Blood (collected 12-20-23 @ 07:32)  Source: .Blood Blood  Final Report (12-25-23 @ 13:01):    No growth at 5 days    Culture - Blood (collected 12-20-23 @ 07:25)  Source: .Blood Blood  Final Report (12-25-23 @ 13:01):    No growth at 5 days    Culture - Blood (collected 12-10-23 @ 18:23)  Source: .Blood Blood-Peripheral  Final Report (12-15-23 @ 22:01):    No growth at 5 days    Culture - Blood (collected 12-10-23 @ 18:18)  Source: .Blood Blood-Peripheral  Final Report (12-15-23 @ 22:01):    No growth at 5 days    Culture - Sputum (collected 11-22-23 @ 00:08)  Source: ET Tube ET Tube  Gram Stain (11-22-23 @ 12:49):    Rare polymorphonuclear leukocytes per low power field    Moderate Yeast per oil power field  Final Report (11-26-23 @ 22:56):    Normal Respiratory Isabell present    Culture - Blood (collected 11-22-23 @ 00:08)  Source: .Blood Blood  Final Report (11-27-23 @ 07:01):    No growth at 5 days    Culture - Blood (collected 11-21-23 @ 23:45)  Source: .Blood Blood  Final Report (11-27-23 @ 07:01):    No growth at 5 days    Culture - Blood (collected 11-17-23 @ 11:28)  Source: .Blood Blood-Peripheral  Final Report (11-22-23 @ 15:09):    No growth at 5 days    Culture - Blood (collected 11-17-23 @ 11:26)  Source: .Blood Blood-Peripheral  Final Report (11-22-23 @ 15:09):    No growth at 5 days    Culture - Blood (collected 11-13-23 @ 07:40)  Source: .Blood Blood-Peripheral  Final Report (11-18-23 @ 14:01):    No growth at 5 days    Culture - Blood (collected 11-12-23 @ 17:55)  Source: .Blood Blood-Peripheral  Final Report (11-18-23 @ 02:00):    No growth at 5 days    Culture - Blood (collected 11-11-23 @ 16:19)  Source: .Blood Blood  Final Report (11-16-23 @ 23:00):    No growth at 5 days    Culture - Sputum (collected 11-11-23 @ 16:11)  Source: ET Tube ET Tube  Gram Stain (11-12-23 @ 11:50):    Numerous polymorphonuclear leukocytes per low power field    Few Squamous epithelial cells per low power field    Few Yeast like cells per oil power field    Numerous Gram Negative Rods per oil power field  Final Report (11-14-23 @ 08:57):    Numerous Klebsiella aerogenes (Previously Enterobacter)    Normal Respiratory Isabell present  Organism: Klebsiella aerogenes (Previously Enterobacter) (11-14-23 @ 08:57)  Organism: Klebsiella aerogenes (Previously Enterobacter) (11-14-23 @ 08:57)    Sensitivities:      Method Type: ADNERS      -  Amoxicillin/Clavulanic Acid: R >16/8      -  Ampicillin: R <=8 These ampicillin results predict results for amoxicillin      -  Ampicillin/Sulbactam: R <=4/2      -  Aztreonam: S <=4      -  Cefazolin: R >16      -  Cefepime: S <=2      -  Cefotaxime: S Enterobacter cloacae, Klebsiella aerogenes, and Citrobacter freundii may develop resistance during prolonged therapy.      -  Cefoxitin: R >16      -  Ceftazidime: S Enterobacter cloacae, Klebsiella aerogenes, and Citrobacter freundii may develop resistance during prolonged therapy.      -  Ceftriaxone: S <=1 Enterobacter cloacae, Klebsiella aerogenes, and Citrobacter freundii may develop resistance during prolonged therapy.      -  Ciprofloxacin: S <=0.25      -  Ertapenem: S <=0.5      -  Gentamicin: S <=2      -  Imipenem: S <=1      -  Levofloxacin: S <=0.5      -  Meropenem: S <=1      -  Piperacillin/Tazobactam: S <=8      -  Tobramycin: S <=2      -  Trimethoprim/Sulfamethoxazole: S <=0.5/9.5    Culture - Blood (collected 11-11-23 @ 15:50)  Source: .Blood Blood  Final Report (11-16-23 @ 23:00):    No growth at 5 days        < from: CT Angio Abdomen and Pelvis w/ IV Cont (12.21.23 @ 17:13) >  FINDINGS:    CHEST:    LUNGS AND LARGE AIRWAYS:  PLEURA:    Endotracheal tube, tip above the level michael.  The central airways are patent.    There are diffuse bilateral upper and lower lobe groundglass   centrilobular and tree-in-bud nodular opacities  There are bibasilar lower lobe airspace consolidations and left lingula   airspace consolidation.    There is a trace left pleural effusion.    VESSELS:  Atherosclerotic changes thoracic aorta with coronary artery calcification   and/or stent.  No central pulmonary embolism is noted.    HEART:   Heart size is normal.  Aortic and mitral valvular calcification. No pericardial effusion.    MEDIASTINUM AND CHERY:  No enlarged lymphadenopathy.    CHEST WALL AND LOWER NECK: Within normal limits.    ABDOMEN AND PELVIS:    LIVER: Within normal limits.  BILE DUCTS: Normal caliber.  GALLBLADDER: Dependent sludge or gallstones.  SPLEEN: Small, peripheral wedge-shaped defect posterior spleen, could   reflect infarct.  PANCREAS: Within normal limits.  ADRENALS: Within normal limits.  KIDNEYS/URETERS:  Left nephrectomy.  Multiple indeterminate right renal hypodense lesions.  No hydronephrosis.    BLADDER: Collapsed around Vallecillo catheter.  REPRODUCTIVE ORGANS: Mildly prominent prostate gland.    BOWEL:  Nasogastric tube within stomach which is nondistended.  There is a distended stomach to  Air filled colon extending to the rectum without bowel obstruction.  Rectal probe is present.  No extraluminal gas or congenital fluid collection.   PERITONEUM: No ascites.    VESSELS:  Aortobiiliac stent grafting right renal artery graft.    The native aneurysm sac measures 7.1 cm, stable from prior exam.  There is suggestion of contrast within the aneurysm sac near the proximal   stent graft attachment on arterial phase imaging, which becomes more   pronounced on delayed phase imaging (5:62, 63 and  10:114,115).    There is a dilated left common iliac artery measuring, 2 cm.  The iliac arteries are patent.    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  The superior mesenteric artery is patent.  The superior mesenteric vein and portal venous confluence is patent.    RETROPERITONEUM/LYMPH NODES:  No enlarged lymphadenopathy.  No retroperitoneal hematoma.    ABDOMINAL WALL:  Small bilateral fat-containing inguinal hernias.  Right femoral venous catheter.    BONES: Degenerative changes.    IMPRESSION:    Diffuse groundglass centrilobular and tree-in-bud nodular opacities which   is suggestive of infectious or aspiration bronchiolitis.    Bilateral lower lobe airspace consolidations may represent pneumonia   and/or atelectasis.    Findings suggestive of endoleak near the proximal stent graft attachment;   evaluation is somewhat limited without noncontrast exam.  Stable size of the aneurysm sac.    Diffuse colonic fecal retention with colonic distention.  No CT evidence for acute thromboembolic mesenteric ischemia.    Findings were discussed by telephone with Dr. Soriano at the time of   interpretation on 12/21/2023.    Other findings as discussed above.    --- End of Report ---        < end of copied text >

## 2023-12-26 NOTE — CONSULT NOTE ADULT - CONSULT REQUESTED DATE/TIME
21-Nov-2023 15:55
22-Nov-2023 16:38
11-Nov-2023 11:05
12-Nov-2023 12:02
21-Dec-2023 14:22
26-Dec-2023 13:27
07-Dec-2023 20:51
10-Nov-2023 11:00
21-Dec-2023 19:55
27-Nov-2023 10:52
12-Nov-2023
17-Nov-2023 12:12
21-Dec-2023 08:30
21-Dec-2023 16:39
25-Nov-2023 17:09
10-Nov-2023 21:13

## 2023-12-26 NOTE — CONSULT NOTE ADULT - ASSESSMENT
82M with past medical history as listed admitted 11/10 with prolonged hospitalization here. Initially admitted as a transfer from [facility] after fall and being found to have right frontal IPH, 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  Extubated 11/13. Course complicated by Acute Kidney Injury, afib, EEG with epileptiform discharges. Patient was trasnferred to step down unit 11/23. TTE had shown possible vegetation on valve but repeat was not diagnostic and blood cultures were negative. Course further complicated by deep vein thrombosis or LUE brachial veins. Patient course further complicated by stroke found on MRI, WHIT recommended but family declined. He subsequently underwent cranioplasty on 12/7, drain removed 12/10. He had worsening of brain bleed 12/15, taken off anticoagulation and reversed. Patient then had acute [id number] on 12/21 for worsening mental status, vomiting, aspiration, and shock, requiring transfer to Intensive care unit for intubation, and pressors. Further complications including acute blood loss anemia requiring PRBCs, klebsiella pneumonia, afib with RVR. He is now s/p extubation 12/25. Patient also found to have fungemia. Palliative consulted for complex medical decision making in the setting of likely life-limiting illness.     #1 Respiratory failure, klebsiella pneumonia  - s/p extubation 12/25  - oxygen supplementation as needed  - antibiotics per Intensive care unit team    #2 Fungemia  - on caspofungin per Intensive care unit     #3 Dysphagia  - speech and swallow pending    #4 Dementia  - need baseline from family    #5 Encounter for palliative care  - s/p extubation  - two daughters are legal surrogates Jennifer , and Angela    - patient with wife who is  ( not legally) - but she is not looking to make decisions, ethics consult noted from early on in admission   - will see how his course unfolds and help with goals of care  82M with past medical history as listed admitted 11/10 with prolonged hospitalization here. Initially admitted as a transfer from [facility] after fall and being found to have right frontal IPH, 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  Extubated 11/13. Course complicated by Acute Kidney Injury, afib, EEG with epileptiform discharges. Patient was trasnferred to step down unit 11/23. TTE had shown possible vegetation on valve but repeat was not diagnostic and blood cultures were negative. Course further complicated by deep vein thrombosis or LUE brachial veins. Patient course further complicated by stroke found on MRI, WHIT recommended but family declined. He subsequently underwent cranioplasty on 12/7, drain removed 12/10. He had worsening of brain bleed 12/15, taken off anticoagulation and reversed. Patient then had acute [id number] on 12/21 for worsening mental status, vomiting, aspiration, and shock, requiring transfer to Intensive care unit for intubation, and pressors. Further complications including acute blood loss anemia requiring PRBCs, klebsiella pneumonia, afib with RVR. He is now s/p extubation 12/25. Patient also found to have fungemia. Palliative consulted for complex medical decision making in the setting of likely life-limiting illness.     #1 Respiratory failure, klebsiella pneumonia  - s/p extubation 12/25  - oxygen supplementation as needed  - antibiotics per Intensive care unit team    #2 Fungemia  - on caspofungin per Intensive care unit     #3 Dysphagia  - speech and swallow pending    #4 Dementia  - need baseline from family    #5 Encounter for palliative care  - s/p extubation  - two daughters are legal surrogates Jennifer , and Angela    - patient with wife who is  ( not legally) - but she is not looking to make decisions, ethics consult noted from early on in admission   - will see how his course unfolds and help with goals of care

## 2023-12-26 NOTE — PROGRESS NOTE ADULT - ASSESSMENT
82y M with PMH HTN, CAD s/p stents, renal cell carcinoma status post left nephrectomy, bladder CA, BPH, CHF, LBBB, vertigo,  HLD, 5.5cm AAA without rupture post EVAR, paroxysmal A-fib on Eliquis, Plavix, and aspirin, early stage Alzheimer dementia transferred from [facility] s/p unwitnessed fall with head strike at home found by wife on floor at 8am. Patient brought to urgent care by wife and had 5 staples to posterior scalp but was instructed to go to nearest ED.  CT head at [place] showed R frontal IPH, SDH, SAH s/p decompressive craniectomy 11/10. Treated for klebseilla aerogenes tracheobronchitis,  LUE DVT, CVA, s/p cranioplasty on 12/7, c/b intracranial bleed, AMS, vomiting and suspected aspiration and septic shock. Patient s intubated and ETT cx + enterobacter, BCX + c.glabrata. cta/p showed stable endoleak-no intervention per vascular          - CT C/Ap  reported endovascular repair with increase in size of aneurysmal sac up to 8cm suspicious for endoleak. repeat Ctl TTE with ?  AV vegetation vs calcification vs artifact, repeat TTE 11/21/23 nondiagnostic, WHIT was recommended by neuro however patient daughter was hesitant.            82y M with PMH HTN, CAD s/p stents, renal cell carcinoma status post left nephrectomy, bladder CA, BPH, CHF, LBBB, vertigo,  HLD, 5.5cm AAA without rupture post EVAR, paroxysmal A-fib on Eliquis, Plavix, and aspirin, early stage Alzheimer dementia transferred from [facility] s/p unwitnessed fall with head strike at home found by wife on floor at 8am. Patient brought to urgent care by wife and had 5 staples to posterior scalp but was instructed to go to nearest ED.  CT head at [place] showed R frontal IPH, SDH, SAH s/p decompressive craniectomy 11/10. Treated for klebseilla aerogenes tracheobronchitis,  LUE DVT, CVA, s/p cranioplasty on 12/7, c/b intracranial bleed, AMS, vomiting and suspected aspiration and septic shock. Patient s intubated and ETT cx + enterobacter, BCX + c.glabrata. cta/p showed stable endoleak-no intervention per vascular          - CT C/Ap  reported endovascular repair with increase in size of aneurysmal sac up to 8cm suspicious for endoleak. repeat Ctl TTE with ?  AV vegetation vs calcification vs artifact, repeat TTE 11/21/23 nondiagnostic, WHIT was recommended by neuro however patient daughter was hesitant.            82y M with PMH HTN, CAD s/p stents, renal cell carcinoma status post left nephrectomy, bladder CA, BPH, CHF, LBBB, vertigo,  HLD, 5.5cm AAA without rupture post EVAR, paroxysmal A-fib on Eliquis, Plavix, and aspirin, early stage Alzheimer dementia transferred from [facility] s/p unwitnessed fall with head strike at home found by wife on floor at 8am. Patient brought to urgent care by wife and had 5 staples to posterior scalp but was instructed to go to nearest ED.  CT head at [place] showed R frontal IPH, SDH, SAH s/p decompressive craniectomy 11/10. Initial TTE with ?  AV vegetation vs calcification vs artifact, repeat TTE 11/21/23 nondiagnostic, family refused WHIT.   Treated for klebseila aerogenes tracheobronchitis,  LUE DVT, CVA, s/p cranioplasty on 12/7,   Hospital course c/b intracranial bleed, AMS, vomiting and suspected aspiration and septic shock. Patient  intubated 12/21 and ETT cx + enterobacter, BCX + c.glabrata. cta/p showed stable endoleak-no intervention per vascular  ID called for fungemia      - dc vori-->caspofungin  - f/u repeat BCX  - f/u BCX candida glabrata (s)  - complete meropenem x 7 days  - rt fem line-removed  - monitor diarrhea   - would repeat TTE  - ophthal eval when stable  - guarded prognosis  - palliative following    will f/u  d/w pharmacy, micu resident               82y M with PMH HTN, CAD s/p stents, renal cell carcinoma status post left nephrectomy, bladder CA, BPH, CHF, LBBB, vertigo,  HLD, 5.5cm AAA without rupture post EVAR, paroxysmal A-fib on Eliquis, Plavix, and aspirin, early stage Alzheimer dementia transferred from [facility] s/p unwitnessed fall with head strike at home found by wife on floor at 8am. Patient brought to urgent care by wife and had 5 staples to posterior scalp but was instructed to go to nearest ED.  CT head at [place] showed R frontal IPH, SDH, SAH s/p decompressive craniectomy 11/10. Initial TTE with ?  AV vegetation vs calcification vs artifact, repeat TTE 11/21/23 nondiagnostic, family refused WHIT.   Treated for klebseila aerogenes tracheobronchitis,  LUE DVT, CVA, s/p cranioplasty on 12/7,   Hospital course c/b intracranial bleed, AMS, vomiting and suspected aspiration and septic shock. Patient  intubated 12/21 and ETT cx + enterobacter, BCX + c.glabrata. cta/p showed stable endoleak-no intervention per vascular  ID called for fungemia      - dc vori-->caspofungin  - f/u repeat BCX  - f/u BCX candida glabrata (s)  - complete meropenem x 7 days  - rt fem line-removed  - monitor diarrhea   - would repeat TTE  - ophthal eval when stable  - guarded prognosis  - palliative following    will f/u  d/w pharmacy, micu resident

## 2023-12-26 NOTE — PROGRESS NOTE ADULT - SUBJECTIVE AND OBJECTIVE BOX
MICU transfer    seen in ICU  family at bedside requesting to decrease keppra as may be contributing to his drowsiness      MEDICATIONS  (STANDING):  atorvastatin 80 milliGRAM(s) Oral at bedtime  caspofungin IVPB      chlorhexidine 2% Cloths 1 Application(s) Topical <User Schedule>  doxazosin 2 milliGRAM(s) Oral at bedtime  folic acid 1 milliGRAM(s) Oral daily  levETIRAcetam   Injectable 1000 milliGRAM(s) IV Push every 12 hours  meropenem Injectable 1000 milliGRAM(s) IV Push every 12 hours  metoprolol tartrate Injectable 5 milliGRAM(s) IV Push every 6 hours  Nephro-roma 1 Tablet(s) Oral daily  pantoprazole  Injectable 40 milliGRAM(s) IV Push two times a day    MEDICATIONS  (PRN):  acetaminophen     Tablet .. 650 milliGRAM(s) Oral every 6 hours PRN Temp greater or equal to 38C (100.4F), Mild Pain (1 - 3)  artificial tears (preservative free) Ophthalmic Solution 1 Drop(s) Both EYES every 6 hours PRN Dry Eyes      Allergies    penicillin (Rash)  heparin (Other (Severe))  penicillin (Hives)  Cipro (Other)  Levaquin (Other)  heparin (Other)      Vital Signs Last 24 Hrs  T(C): 37.1 (26 Dec 2023 15:00), Max: 37.4 (25 Dec 2023 20:30)  T(F): 98.8 (26 Dec 2023 15:00), Max: 99.3 (25 Dec 2023 20:30)  HR: 93 (26 Dec 2023 17:00) (89 - 127)  BP: 135/59 (26 Dec 2023 17:00) (126/92 - 157/68)  BP(mean): 83 (26 Dec 2023 17:00) (83 - 101)  RR: 24 (26 Dec 2023 17:00) (0 - 32)  SpO2: 96% (26 Dec 2023 17:00) (94% - 100%)    Parameters below as of 26 Dec 2023 07:30  Patient On (Oxygen Delivery Method): nasal cannula  O2 Flow (L/min): 2      PHYSICAL EXAM:    GENERAL: NAD frail   CHEST/LUNG: Clear to ausculation bilaterall  HEART: irreg rate and rhythm; S1 S2  ABDOMEN: Soft,  Nondistended; Bowel sounds present  EXTREMITIES:  no edema   rectal tube in place   NERVOUS SYSTEM:  arousable, minimally verbal but recognizes daughters at bedside     LABS:                        9.0    10.45 )-----------( 138      ( 26 Dec 2023 03:38 )             25.1     12-26    144  |  112<H>  |  31.3<H>  ----------------------------<  86  2.9<LL>   |  20.0<L>  |  1.02    Ca    8.5      26 Dec 2023 03:38  Phos  1.7     12-26  Mg     1.8     12-26    TPro  4.8<L>  /  Alb  2.1<L>  /  TBili  0.2<L>  /  DBili  x   /  AST  43<H>  /  ALT  30  /  AlkPhos  83  12-26      Urinalysis Basic - ( 26 Dec 2023 03:38 )    Color: x / Appearance: x / SG: x / pH: x  Gluc: 86 mg/dL / Ketone: x  / Bili: x / Urobili: x   Blood: x / Protein: x / Nitrite: x   Leuk Esterase: x / RBC: x / WBC x   Sq Epi: x / Non Sq Epi: x / Bacteria: x        CAPILLARY BLOOD GLUCOSE      POCT Blood Glucose.: 91 mg/dL (26 Dec 2023 00:53)        RADIOLOGY & ADDITIONAL TESTS:

## 2023-12-27 DIAGNOSIS — D63.8 ANEMIA IN OTHER CHRONIC DISEASES CLASSIFIED ELSEWHERE: ICD-10-CM

## 2023-12-27 DIAGNOSIS — J69.0 PNEUMONITIS DUE TO INHALATION OF FOOD AND VOMIT: ICD-10-CM

## 2023-12-27 DIAGNOSIS — B49 UNSPECIFIED MYCOSIS: ICD-10-CM

## 2023-12-27 DIAGNOSIS — G93.41 METABOLIC ENCEPHALOPATHY: ICD-10-CM

## 2023-12-27 DIAGNOSIS — R13.10 DYSPHAGIA, UNSPECIFIED: ICD-10-CM

## 2023-12-27 LAB
-  AMPHOTERICIN B: SIGNIFICANT CHANGE UP
-  AMPHOTERICIN B: SIGNIFICANT CHANGE UP
-  ANIDULAFUNGIN: SIGNIFICANT CHANGE UP
-  ANIDULAFUNGIN: SIGNIFICANT CHANGE UP
-  CASPOFUNGIN: SIGNIFICANT CHANGE UP
-  CASPOFUNGIN: SIGNIFICANT CHANGE UP
-  FLUCONAZOLE: SIGNIFICANT CHANGE UP
-  FLUCONAZOLE: SIGNIFICANT CHANGE UP
-  MICAFUNGIN: SIGNIFICANT CHANGE UP
-  MICAFUNGIN: SIGNIFICANT CHANGE UP
-  POSACONAZOLE: SIGNIFICANT CHANGE UP
-  POSACONAZOLE: SIGNIFICANT CHANGE UP
-  VORICONAZOLE: SIGNIFICANT CHANGE UP
-  VORICONAZOLE: SIGNIFICANT CHANGE UP
1,3 BETA GLUCAN SER QL: POSITIVE
1,3 BETA GLUCAN SER QL: POSITIVE
ALBUMIN SERPL ELPH-MCNC: 1.9 G/DL — LOW (ref 3.3–5.2)
ALBUMIN SERPL ELPH-MCNC: 1.9 G/DL — LOW (ref 3.3–5.2)
ALBUMIN SERPL ELPH-MCNC: 2.1 G/DL — LOW (ref 3.3–5.2)
ALBUMIN SERPL ELPH-MCNC: 2.1 G/DL — LOW (ref 3.3–5.2)
ALP SERPL-CCNC: 85 U/L — SIGNIFICANT CHANGE UP (ref 40–120)
ALP SERPL-CCNC: 85 U/L — SIGNIFICANT CHANGE UP (ref 40–120)
ALP SERPL-CCNC: 87 U/L — SIGNIFICANT CHANGE UP (ref 40–120)
ALP SERPL-CCNC: 87 U/L — SIGNIFICANT CHANGE UP (ref 40–120)
ALT FLD-CCNC: 21 U/L — SIGNIFICANT CHANGE UP
ALT FLD-CCNC: 21 U/L — SIGNIFICANT CHANGE UP
ALT FLD-CCNC: 23 U/L — SIGNIFICANT CHANGE UP
ALT FLD-CCNC: 23 U/L — SIGNIFICANT CHANGE UP
ANION GAP SERPL CALC-SCNC: 15 MMOL/L — SIGNIFICANT CHANGE UP (ref 5–17)
AST SERPL-CCNC: 32 U/L — SIGNIFICANT CHANGE UP
AST SERPL-CCNC: 32 U/L — SIGNIFICANT CHANGE UP
AST SERPL-CCNC: 34 U/L — SIGNIFICANT CHANGE UP
AST SERPL-CCNC: 34 U/L — SIGNIFICANT CHANGE UP
BILIRUB SERPL-MCNC: 0.4 MG/DL — SIGNIFICANT CHANGE UP (ref 0.4–2)
BILIRUB SERPL-MCNC: 0.4 MG/DL — SIGNIFICANT CHANGE UP (ref 0.4–2)
BILIRUB SERPL-MCNC: 0.5 MG/DL — SIGNIFICANT CHANGE UP (ref 0.4–2)
BILIRUB SERPL-MCNC: 0.5 MG/DL — SIGNIFICANT CHANGE UP (ref 0.4–2)
BUN SERPL-MCNC: 27.5 MG/DL — HIGH (ref 8–20)
BUN SERPL-MCNC: 27.5 MG/DL — HIGH (ref 8–20)
BUN SERPL-MCNC: 28.1 MG/DL — HIGH (ref 8–20)
BUN SERPL-MCNC: 28.1 MG/DL — HIGH (ref 8–20)
CALCIUM SERPL-MCNC: 8.4 MG/DL — SIGNIFICANT CHANGE UP (ref 8.4–10.5)
CALCIUM SERPL-MCNC: 8.4 MG/DL — SIGNIFICANT CHANGE UP (ref 8.4–10.5)
CALCIUM SERPL-MCNC: 8.5 MG/DL — SIGNIFICANT CHANGE UP (ref 8.4–10.5)
CALCIUM SERPL-MCNC: 8.5 MG/DL — SIGNIFICANT CHANGE UP (ref 8.4–10.5)
CHLORIDE SERPL-SCNC: 109 MMOL/L — HIGH (ref 96–108)
CHLORIDE SERPL-SCNC: 109 MMOL/L — HIGH (ref 96–108)
CHLORIDE SERPL-SCNC: 112 MMOL/L — HIGH (ref 96–108)
CHLORIDE SERPL-SCNC: 112 MMOL/L — HIGH (ref 96–108)
CO2 SERPL-SCNC: 16 MMOL/L — LOW (ref 22–29)
CO2 SERPL-SCNC: 16 MMOL/L — LOW (ref 22–29)
CO2 SERPL-SCNC: 19 MMOL/L — LOW (ref 22–29)
CO2 SERPL-SCNC: 19 MMOL/L — LOW (ref 22–29)
CREAT SERPL-MCNC: 0.89 MG/DL — SIGNIFICANT CHANGE UP (ref 0.5–1.3)
CREAT SERPL-MCNC: 0.89 MG/DL — SIGNIFICANT CHANGE UP (ref 0.5–1.3)
CREAT SERPL-MCNC: 0.92 MG/DL — SIGNIFICANT CHANGE UP (ref 0.5–1.3)
CREAT SERPL-MCNC: 0.92 MG/DL — SIGNIFICANT CHANGE UP (ref 0.5–1.3)
CULTURE RESULTS: ABNORMAL
EGFR: 83 ML/MIN/1.73M2 — SIGNIFICANT CHANGE UP
EGFR: 83 ML/MIN/1.73M2 — SIGNIFICANT CHANGE UP
EGFR: 86 ML/MIN/1.73M2 — SIGNIFICANT CHANGE UP
EGFR: 86 ML/MIN/1.73M2 — SIGNIFICANT CHANGE UP
FUNGITELL: 91 PG/ML
FUNGITELL: 91 PG/ML
GLUCOSE BLDC GLUCOMTR-MCNC: 70 MG/DL — SIGNIFICANT CHANGE UP (ref 70–99)
GLUCOSE BLDC GLUCOMTR-MCNC: 70 MG/DL — SIGNIFICANT CHANGE UP (ref 70–99)
GLUCOSE BLDC GLUCOMTR-MCNC: 82 MG/DL — SIGNIFICANT CHANGE UP (ref 70–99)
GLUCOSE BLDC GLUCOMTR-MCNC: 82 MG/DL — SIGNIFICANT CHANGE UP (ref 70–99)
GLUCOSE BLDC GLUCOMTR-MCNC: 97 MG/DL — SIGNIFICANT CHANGE UP (ref 70–99)
GLUCOSE BLDC GLUCOMTR-MCNC: 97 MG/DL — SIGNIFICANT CHANGE UP (ref 70–99)
GLUCOSE SERPL-MCNC: 71 MG/DL — SIGNIFICANT CHANGE UP (ref 70–99)
GLUCOSE SERPL-MCNC: 71 MG/DL — SIGNIFICANT CHANGE UP (ref 70–99)
GLUCOSE SERPL-MCNC: 72 MG/DL — SIGNIFICANT CHANGE UP (ref 70–99)
GLUCOSE SERPL-MCNC: 72 MG/DL — SIGNIFICANT CHANGE UP (ref 70–99)
HCT VFR BLD CALC: 27.2 % — LOW (ref 39–50)
HCT VFR BLD CALC: 27.2 % — LOW (ref 39–50)
HCT VFR BLD CALC: 32.8 % — LOW (ref 39–50)
HCT VFR BLD CALC: 32.8 % — LOW (ref 39–50)
HGB BLD-MCNC: 11.6 G/DL — LOW (ref 13–17)
HGB BLD-MCNC: 11.6 G/DL — LOW (ref 13–17)
HGB BLD-MCNC: 9.2 G/DL — LOW (ref 13–17)
HGB BLD-MCNC: 9.2 G/DL — LOW (ref 13–17)
MAGNESIUM SERPL-MCNC: 2.1 MG/DL — SIGNIFICANT CHANGE UP (ref 1.6–2.6)
MCHC RBC-ENTMCNC: 30.3 PG — SIGNIFICANT CHANGE UP (ref 27–34)
MCHC RBC-ENTMCNC: 30.3 PG — SIGNIFICANT CHANGE UP (ref 27–34)
MCHC RBC-ENTMCNC: 32 PG — SIGNIFICANT CHANGE UP (ref 27–34)
MCHC RBC-ENTMCNC: 32 PG — SIGNIFICANT CHANGE UP (ref 27–34)
MCHC RBC-ENTMCNC: 33.8 GM/DL — SIGNIFICANT CHANGE UP (ref 32–36)
MCHC RBC-ENTMCNC: 33.8 GM/DL — SIGNIFICANT CHANGE UP (ref 32–36)
MCHC RBC-ENTMCNC: 35.4 GM/DL — SIGNIFICANT CHANGE UP (ref 32–36)
MCHC RBC-ENTMCNC: 35.4 GM/DL — SIGNIFICANT CHANGE UP (ref 32–36)
MCV RBC AUTO: 89.5 FL — SIGNIFICANT CHANGE UP (ref 80–100)
MCV RBC AUTO: 89.5 FL — SIGNIFICANT CHANGE UP (ref 80–100)
MCV RBC AUTO: 90.6 FL — SIGNIFICANT CHANGE UP (ref 80–100)
MCV RBC AUTO: 90.6 FL — SIGNIFICANT CHANGE UP (ref 80–100)
METHOD TYPE: SIGNIFICANT CHANGE UP
METHOD TYPE: SIGNIFICANT CHANGE UP
ORGANISM # SPEC MICROSCOPIC CNT: ABNORMAL
ORGANISM # SPEC MICROSCOPIC CNT: SIGNIFICANT CHANGE UP
ORGANISM # SPEC MICROSCOPIC CNT: SIGNIFICANT CHANGE UP
PHOSPHATE SERPL-MCNC: 2.6 MG/DL — SIGNIFICANT CHANGE UP (ref 2.4–4.7)
PHOSPHATE SERPL-MCNC: 2.6 MG/DL — SIGNIFICANT CHANGE UP (ref 2.4–4.7)
PHOSPHATE SERPL-MCNC: 2.7 MG/DL — SIGNIFICANT CHANGE UP (ref 2.4–4.7)
PHOSPHATE SERPL-MCNC: 2.7 MG/DL — SIGNIFICANT CHANGE UP (ref 2.4–4.7)
PLATELET # BLD AUTO: 190 K/UL — SIGNIFICANT CHANGE UP (ref 150–400)
PLATELET # BLD AUTO: 190 K/UL — SIGNIFICANT CHANGE UP (ref 150–400)
PLATELET # BLD AUTO: 221 K/UL — SIGNIFICANT CHANGE UP (ref 150–400)
PLATELET # BLD AUTO: 221 K/UL — SIGNIFICANT CHANGE UP (ref 150–400)
POTASSIUM SERPL-MCNC: 2.8 MMOL/L — CRITICAL LOW (ref 3.5–5.3)
POTASSIUM SERPL-MCNC: 2.8 MMOL/L — CRITICAL LOW (ref 3.5–5.3)
POTASSIUM SERPL-MCNC: 3.1 MMOL/L — LOW (ref 3.5–5.3)
POTASSIUM SERPL-MCNC: 3.1 MMOL/L — LOW (ref 3.5–5.3)
POTASSIUM SERPL-SCNC: 2.8 MMOL/L — CRITICAL LOW (ref 3.5–5.3)
POTASSIUM SERPL-SCNC: 2.8 MMOL/L — CRITICAL LOW (ref 3.5–5.3)
POTASSIUM SERPL-SCNC: 3.1 MMOL/L — LOW (ref 3.5–5.3)
POTASSIUM SERPL-SCNC: 3.1 MMOL/L — LOW (ref 3.5–5.3)
PROT SERPL-MCNC: 5.2 G/DL — LOW (ref 6.6–8.7)
PROT SERPL-MCNC: 5.2 G/DL — LOW (ref 6.6–8.7)
PROT SERPL-MCNC: 5.3 G/DL — LOW (ref 6.6–8.7)
PROT SERPL-MCNC: 5.3 G/DL — LOW (ref 6.6–8.7)
RBC # BLD: 3.04 M/UL — LOW (ref 4.2–5.8)
RBC # BLD: 3.04 M/UL — LOW (ref 4.2–5.8)
RBC # BLD: 3.62 M/UL — LOW (ref 4.2–5.8)
RBC # BLD: 3.62 M/UL — LOW (ref 4.2–5.8)
RBC # FLD: 16.9 % — HIGH (ref 10.3–14.5)
RBC # FLD: 16.9 % — HIGH (ref 10.3–14.5)
RBC # FLD: 17.2 % — HIGH (ref 10.3–14.5)
RBC # FLD: 17.2 % — HIGH (ref 10.3–14.5)
SODIUM SERPL-SCNC: 143 MMOL/L — SIGNIFICANT CHANGE UP (ref 135–145)
SPECIMEN SOURCE: SIGNIFICANT CHANGE UP
SPECIMEN SOURCE: SIGNIFICANT CHANGE UP
WBC # BLD: 12.6 K/UL — HIGH (ref 3.8–10.5)
WBC # BLD: 12.6 K/UL — HIGH (ref 3.8–10.5)
WBC # BLD: 15.88 K/UL — HIGH (ref 3.8–10.5)
WBC # BLD: 15.88 K/UL — HIGH (ref 3.8–10.5)
WBC # FLD AUTO: 12.6 K/UL — HIGH (ref 3.8–10.5)
WBC # FLD AUTO: 12.6 K/UL — HIGH (ref 3.8–10.5)
WBC # FLD AUTO: 15.88 K/UL — HIGH (ref 3.8–10.5)
WBC # FLD AUTO: 15.88 K/UL — HIGH (ref 3.8–10.5)

## 2023-12-27 PROCEDURE — 93010 ELECTROCARDIOGRAM REPORT: CPT

## 2023-12-27 PROCEDURE — 36556 INSERT NON-TUNNEL CV CATH: CPT

## 2023-12-27 PROCEDURE — 76942 ECHO GUIDE FOR BIOPSY: CPT | Mod: 26,59

## 2023-12-27 PROCEDURE — 99233 SBSQ HOSP IP/OBS HIGH 50: CPT

## 2023-12-27 PROCEDURE — 76937 US GUIDE VASCULAR ACCESS: CPT | Mod: 26,59

## 2023-12-27 RX ORDER — LACOSAMIDE 50 MG/1
150 TABLET ORAL EVERY 12 HOURS
Refills: 0 | Status: DISCONTINUED | OUTPATIENT
Start: 2023-12-27 | End: 2024-01-05

## 2023-12-27 RX ORDER — POTASSIUM CHLORIDE 20 MEQ
10 PACKET (EA) ORAL
Refills: 0 | Status: COMPLETED | OUTPATIENT
Start: 2023-12-27 | End: 2023-12-27

## 2023-12-27 RX ORDER — MEROPENEM 1 G/30ML
1000 INJECTION INTRAVENOUS EVERY 8 HOURS
Refills: 0 | Status: DISCONTINUED | OUTPATIENT
Start: 2023-12-27 | End: 2023-12-29

## 2023-12-27 RX ORDER — MEROPENEM 1 G/30ML
1000 INJECTION INTRAVENOUS EVERY 8 HOURS
Refills: 0 | Status: DISCONTINUED | OUTPATIENT
Start: 2023-12-27 | End: 2023-12-27

## 2023-12-27 RX ORDER — SODIUM CHLORIDE 9 MG/ML
1000 INJECTION, SOLUTION INTRAVENOUS
Refills: 0 | Status: DISCONTINUED | OUTPATIENT
Start: 2023-12-27 | End: 2023-12-28

## 2023-12-27 RX ADMIN — Medication 5 MILLIGRAM(S): at 18:42

## 2023-12-27 RX ADMIN — LEVETIRACETAM 1000 MILLIGRAM(S): 250 TABLET, FILM COATED ORAL at 07:44

## 2023-12-27 RX ADMIN — Medication 100 MILLIEQUIVALENT(S): at 09:02

## 2023-12-27 RX ADMIN — SODIUM CHLORIDE 88 MILLILITER(S): 9 INJECTION, SOLUTION INTRAVENOUS at 18:35

## 2023-12-27 RX ADMIN — Medication 5 MILLIGRAM(S): at 05:08

## 2023-12-27 RX ADMIN — Medication 5 MILLIGRAM(S): at 12:41

## 2023-12-27 RX ADMIN — Medication 5 MILLIGRAM(S): at 23:31

## 2023-12-27 RX ADMIN — SODIUM CHLORIDE 88 MILLILITER(S): 9 INJECTION, SOLUTION INTRAVENOUS at 12:41

## 2023-12-27 RX ADMIN — PANTOPRAZOLE SODIUM 40 MILLIGRAM(S): 20 TABLET, DELAYED RELEASE ORAL at 05:09

## 2023-12-27 RX ADMIN — MEROPENEM 1000 MILLIGRAM(S): 1 INJECTION INTRAVENOUS at 03:40

## 2023-12-27 RX ADMIN — Medication 100 MILLIEQUIVALENT(S): at 16:19

## 2023-12-27 RX ADMIN — MEROPENEM 1000 MILLIGRAM(S): 1 INJECTION INTRAVENOUS at 21:40

## 2023-12-27 RX ADMIN — MEROPENEM 1000 MILLIGRAM(S): 1 INJECTION INTRAVENOUS at 13:20

## 2023-12-27 RX ADMIN — SODIUM CHLORIDE 88 MILLILITER(S): 9 INJECTION, SOLUTION INTRAVENOUS at 07:44

## 2023-12-27 RX ADMIN — Medication 100 MILLIEQUIVALENT(S): at 09:00

## 2023-12-27 RX ADMIN — Medication 100 MILLIEQUIVALENT(S): at 09:51

## 2023-12-27 RX ADMIN — Medication 100 MILLIEQUIVALENT(S): at 18:35

## 2023-12-27 RX ADMIN — Medication 5 MILLIGRAM(S): at 00:25

## 2023-12-27 RX ADMIN — SODIUM CHLORIDE 88 MILLILITER(S): 9 INJECTION, SOLUTION INTRAVENOUS at 08:59

## 2023-12-27 RX ADMIN — PANTOPRAZOLE SODIUM 40 MILLIGRAM(S): 20 TABLET, DELAYED RELEASE ORAL at 18:43

## 2023-12-27 RX ADMIN — CHLORHEXIDINE GLUCONATE 1 APPLICATION(S): 213 SOLUTION TOPICAL at 05:13

## 2023-12-27 RX ADMIN — CASPOFUNGIN ACETATE 260 MILLIGRAM(S): 7 INJECTION, POWDER, LYOPHILIZED, FOR SOLUTION INTRAVENOUS at 16:19

## 2023-12-27 RX ADMIN — Medication 100 MILLIEQUIVALENT(S): at 20:31

## 2023-12-27 NOTE — SWALLOW BEDSIDE ASSESSMENT ADULT - SWALLOW EVAL: DIAGNOSIS
Moderate oral & suspected pharyngeal dysphagia, likely multifactorial with cognitive limitations & further neuromuscular dysfunction. Oral phase of swallow marked by reduced oral grading w/poor bolus formation/propulsion. Mod-max cues needed to facilitate A-P transfer over BOT. Minimal progression w/mod diffuse oral cavity stasis noted, necessitating [place] suctioning for removal. Pharyngeal dysphagia suspected at this time w/single most conservative consistency trialed puree, suspect effortful elicitation of swallow trigger w/reduced laryngeal elevation as per digital palpation. Increasing audible upper airway congestion post swallow trigger w/delayed wet & prolonged cough post intake.
Severe oral dysphagia impacted upon by lethargy & cognitive limitations, marked by poor orientation to utensil w/limited oral acceptance of minimal amounts of PO provided puree. Eventual fleeting moment of arousal w/oral acceptance however once placed in oral cavity, trial remaining in anterior sulcus. No benefit at initiation of bolus propulsion despite max cues/efforts. Oral suctioning provided via [place] for removal. NOREEN pharyngeal phase of swallow as no pharyngeal motor response demonstrated. Further PO deferred at this time, oral diet initiation remains contraindicated.

## 2023-12-27 NOTE — RAPID RESPONSE TEAM SUMMARY - NSSITUATIONBACKGROUNDRRT_GEN_ALL_CORE
83y M with PMH HTN, CAD s/p PCI, renal cell carcinoma status post left nephrectomy, bladder CA, BPH, CHF, LBBB, vertigo, HLD, 5.5cm AAA s/p post EVAR, paroxysmal afib on Eliquis, Plavix, and Aspirin, h/o HIT, early stage Alzheimer's dementia transferred from [place] 11/10/23 s/p fall found with multicompartmental ICH, s/p R craniectomy 11/10 and eventual cranioplasty 12/7/23. Hospital course complicated by afib with RVR, Klebsiella pneumonia, LUE DVT, multiple subsequent episodes of ICH after attempting to restart Eliquis, now with acute respiratory failure, aspiration pneumonia, shock, hematemesis. Downgraded today to SDU.     RRT called for SVT on tele. Upon arrival to bedside pt had converted to sinus tachy w/ rates of 120 without intervention. Pt was asymptomatic during SVT episode according to RN.  Vitals stable, , spo2 95%, RR 18 and /74. BG 70s. Pt without complaints (a&ox1-2, poor historian).  83y M with PMH HTN, CAD s/p PCI, renal cell carcinoma status post left nephrectomy, bladder CA, BPH, CHF, LBBB, vertigo, HLD, 5.5cm AAA s/p post EVAR, paroxysmal afib on Eliquis, Plavix, and Aspirin, h/o HIT, early stage Alzheimer's dementia transferred from [place] 11/10/23 s/p fall found with multicompartmental ICH, s/p R craniectomy 11/10 and eventual cranioplasty 12/7/23. Hospital course complicated by afib with RVR, Klebsiella pneumonia, LUE DVT, multiple subsequent episodes of ICH after attempting to restart Eliquis, now with acute respiratory failure, aspiration pneumonia, shock, hematemesis. Downgraded today to SDU.     RRT called for SVT on tele. Upon arrival to bedside pt had converted to sinus tachy w/ rates of 120 without intervention. Pt was asymptomatic during SVT episode according to RN.  Vitals stable, , spo2 95%, RR 18 and /74. BG 70s. Pt without complaints (a&ox1-2, poor historian).  83y M with PMH HTN, CAD s/p PCI, renal cell carcinoma status post left nephrectomy, bladder CA, BPH, CHF, LBBB, vertigo, HLD, 5.5cm AAA s/p post EVAR, paroxysmal afib on Eliquis, Plavix, and Aspirin, h/o HIT, early stage Alzheimer's dementia transferred from [place] 11/10/23 s/p fall found with multicompartmental ICH, s/p R craniectomy 11/10 and eventual cranioplasty 12/7/23. Hospital course complicated by afib with RVR, Klebsiella pneumonia, LUE DVT, multiple subsequent episodes of ICH after attempting to restart Eliquis, now with acute respiratory failure, aspiration pneumonia, shock, hematemesis. Downgraded today to SDU.     RRT called for SVT on tele. Upon arrival to bedside pt had converted back to sinus tach w/ rates of 120 without intervention. Pt was asymptomatic during SVT episode according to RN. Vitals stable, , spo2 95%, RR 18 and /74. BG 70s. Pt without complaints (a&ox1-2, poor historian).  83y M with PMH HTN, CAD s/p PCI, renal cell carcinoma status post left nephrectomy, bladder CA, BPH, CHF, LBBB, vertigo, HLD, 5.5cm AAA s/p post EVAR, paroxysmal afib on Eliquis, Plavix, and Aspirin, h/o HIT, early stage Alzheimer's dementia transferred from [place] 11/10/23 s/p fall found with multicompartmental ICH, s/p R craniectomy 11/10 and eventual cranioplasty 12/7/23. Hospital course complicated by afib with RVR, Klebsiella pneumonia, LUE DVT, multiple subsequent episodes of ICH after attempting to restart Eliquis, now with acute respiratory failure, aspiration pneumonia, shock, hematemesis. Downgraded today to SDU.     RRT called for SVT on tele. Upon arrival to bedside pt had converted back to sinus tach w/ rates of 120 without intervention. Pt was asymptomatic during SVT episode according to RN. Vitals stable, , spo2 95%, RR 18 and /74. BG 70s. Pt without complaints (a&ox1-2, poor historian).

## 2023-12-27 NOTE — PROGRESS NOTE ADULT - ASSESSMENT
· Assessment	  83y M with PMH HTN, CAD s/p PCI, renal cell carcinoma status post left nephrectomy, bladder CA, BPH, CHF, LBBB, vertigo,  HLD, 5.5cm AAA s/p post EVAR, paroxysmal afib on Eliquis, Plavix, and Aspirin, h/o HIT, early stage Alzheimer's dementia transferred from [place] 11/10/23 s/p fall found with multicompartmental ICH, s/p R craniectomy 11/10 and eventual cranioplasty 12/7/23. Hospital course complicated by afib with RVR, Klebsiella pneumonia, LUE DVT, multiple subsequent episodes of ICH after attempting to restart Eliquis, now with acute respiratory failure, aspiration pneumonia, shock, hematemesis.  - Extubated 12/25  - Hemoglobin stable at 7.7  - Neuro exam stable    PLAN:  - Q4 neuro checks  - Continue Keppra 1g BID  - Monitor incision - no dressing needed at this time   - From neurosurgical standpoint okay to resume ASA 81 and chemo dvt ppx (previously on Fondaparinux for HIT positive)   - Continue holding Eliquis- unsure when and if patient would be a safe candidate to resume with multiple episodes of new ICH after attempting to resume  - Supportive care/further medical management per MICU   · Assessment	  83y M with PMH HTN, CAD s/p PCI, renal cell carcinoma status post left nephrectomy, bladder CA, BPH, CHF, LBBB, vertigo,  HLD, 5.5cm AAA s/p post EVAR, paroxysmal afib on Eliquis, Plavix, and Aspirin, h/o HIT, early stage Alzheimer's dementia transferred from [place] 11/10/23 s/p fall found with multicompartmental ICH, s/p R craniectomy 11/10 and eventual cranioplasty 12/7/23. Hospital course complicated by afib with RVR, Klebsiella pneumonia, LUE DVT, multiple subsequent episodes of ICH after attempting to restart Eliquis, now with acute respiratory failure, aspiration pneumonia, shock, hematemesis.  - Extubated 12/25  - Hemoglobin stable at 7.7  - Neuro exam stable    PLAN:  - Q4 neuro checks  - Continue Keppra 1g BID  - Monitor incision - no dressing needed at this time   - From neurosurgical standpoint okay to resume ASA 81 and chemo dvt ppx (previously on Fondaparinux for HIT positive)   - Continue holding Eliquis- unsure when and if patient would be a safe candidate to resume with multiple episodes of new ICH after attempting to resume  - Supportive care/further medical management per MICU   · Assessment	  83y M with PMH HTN, CAD s/p PCI, renal cell carcinoma status post left nephrectomy, bladder CA, BPH, CHF, LBBB, vertigo,  HLD, 5.5cm AAA s/p post EVAR, paroxysmal afib on Eliquis, Plavix, and Aspirin, h/o HIT, early stage Alzheimer's dementia transferred from [place] 11/10/23 s/p fall found with multicompartmental ICH, s/p R craniectomy 11/10 and eventual cranioplasty 12/7/23. Hospital course complicated by afib with RVR, Klebsiella pneumonia, LUE DVT, multiple subsequent episodes of ICH after attempting to restart Eliquis, now with acute respiratory failure, aspiration pneumonia, shock, hematemesis.  - Extubated 12/25  - Hemoglobin stable at 7.7  - Neuro exam stable    PLAN:  - Q2 neuro checks  - Continue Keppra 1g BID  - Monitor incision - no dressing needed at this time   - From neurosurgical standpoint okay to resume ASA 81 and chemo dvt ppx (previously on Fondaparinux for HIT positive)   - Continue holding Eliquis- unsure when and if patient would be a safe candidate to resume with multiple episodes of new ICH after attempting to resume  - Supportive care/further medical management per MICU   · Assessment	  83y M with PMH HTN, CAD s/p PCI, renal cell carcinoma status post left nephrectomy, bladder CA, BPH, CHF, LBBB, vertigo,  HLD, 5.5cm AAA s/p post EVAR, paroxysmal afib on Eliquis, Plavix, and Aspirin, h/o HIT, early stage Alzheimer's dementia transferred from [place] 11/10/23 s/p fall found with multicompartmental ICH, s/p R craniectomy 11/10 and eventual cranioplasty 12/7/23. Hospital course complicated by afib with RVR, Klebsiella pneumonia, LUE DVT, multiple subsequent episodes of ICH after attempting to restart Eliquis, now with acute respiratory failure, aspiration pneumonia, shock, hematemesis.  - Extubated 12/25  - Hemoglobin stable at 7.7  - Neuro exam stable    PLAN:  - Q2 neuro checks  - Continue Keppra 1g BID  - Monitor incision - no dressing needed at this time   - From neurosurgical standpoint okay to resume ASA 81 and chemo dvt ppx (previously on Fondaparinux for HIT positive)   - Continue holding Eliquis- unsure when and if patient would be a safe candidate to resume with multiple episodes of new ICH after attempting to resume  - Supportive care/further medical management per MICU

## 2023-12-27 NOTE — PROGRESS NOTE ADULT - ASSESSMENT
The patient is a 83y Male with large right hemisphere intracranial hemorrhage now status post hemicraniectomy for evacuation and relief of pressure.  Patient was on anticoagulation for atrial fibrillation prior to ICH.   MRI showed acute lacunar infarcts in addition to the hemorrhages.  This was felt to be of embolic origin although anticoagulation could not be restarted secondary to the hemorrhage.   EEG showed sharp waves from the area of the hematoma and the patient has been on Keppra.   My recommendation is to continue the current regimen of 1000 mg twice per day.    At this point, my feeling is that the depressed mental status is multifactorial.   Clearly there is a component from the initial brain insult/injury. This will be slow to improve, and may be permanent. Although there is likely a component from medication as well, the presence of epileptiform activity in the area of the hematoma suggests the need to continue Keppra.   There is likely a component from infection as well.   He is being treated for fungemia as well as aspiration pneumonia.   There is also concern for endocarditis.   Family had refused WHIT.     In addition, the patient had what was described as "early stage" Alzheimer's dementia prior to the injury.  It is typical for patients with dementia not to recover to their premorbid baseline after traumatic Brain Injury, stroke, and other cerebral insults.  We can consider a trial of Memantine if he should remain lethargic after infection cleared.     All above discussed with Dr DEBRA Yanes.

## 2023-12-27 NOTE — PROGRESS NOTE ADULT - ASSESSMENT
82y M with PMH HTN, CAD s/p stents, renal cell carcinoma status post left nephrectomy, bladder CA, BPH, CHF, LBBB, vertigo,  HLD, 5.5cm AAA without rupture post EVAR, paroxysmal A-fib on Eliquis, Plavix, and aspirin, early stage Alzheimer dementia transferred from [facility] s/p unwitnessed fall with head strike at home found by wife on floor at 8am. Patient brought to urgent care by wife and had 5 staples to posterior scalp but was instructed to go to nearest ED.  CT head at [facility] showed R frontal IPH, SDH, SAH at 9:00. CTA stroke protocol not performed at [facility]. Patient BIB on 6L NC.  Pt GCS 15 on arrival, A&Ox2 on arrival. After initial assessment, patient noted to be vomiting enroute to CT scanner.   admitted 11/10 with prolonged hospitalization here.  being found to have right frontal IPH, 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  Extubated 11/13. Course complicated by Acute Kidney Injury, afib, EEG with epileptiform discharges. Patient was trasnferred to step down unit 11/23. TTE had shown possible vegetation on valve but repeat was not diagnostic and blood cultures were negative. Course further complicated by deep vein thrombosis or LUE brachial veins. Patient course further complicated by stroke found on MRI, WHIT recommended but family declined. He subsequently underwent cranioplasty on 12/7, drain removed 12/10. He had worsening of brain bleed 12/15, taken off anticoagulation and reversed. Patient then had acute decompensation on 12/21 for worsening mental status, vomiting, aspiration, and shock, requiring transfer to Intensive care unit for intubation, and pressors. Further complications including acute blood loss anemia requiring PRBCs, klebsiella pnuemonia, afib with RVR. He is now s/p extubation 12/25.      # acute respiratory failure  # aspiration pneumonia  Currently normoxic on room air  Worsening Leukocytosis  - Aspiration precautions  - Monitor for hypoxia  - Meropenem  - ID follow up    # Encephalopathy, multifactorial. ICH, CVA, ICU, possible delirium  - frequent reorientation  - Neurology consult    # Dysphagia  Currently NPO  - SLP evaluation  - Reinsert NGT if failed    # fungemia:   - caspofungin per ID     # Atrial Fibrillation  - Metoprolol IV  - No anticoagulation given ICH    # ICH s/p craniotomy and cranioplasty  abnormal EEG with risk of seizures  -  Levetiracetam 1000mg BID, recall neurology in am to discuss about lowering it per daughters request    neurosurgery following    # anemia: likely multifactorial    s/p PRBC    c/w PPI,     no intervention per GI.    # hypokalemia:   Replace  Check Mg, Phos    d/w girlfriend at bedside.    82y M with PMH HTN, CAD s/p stents, renal cell carcinoma status post left nephrectomy, bladder CA, BPH, CHF, LBBB, vertigo,  HLD, 5.5cm AAA without rupture post EVAR, paroxysmal A-fib on Eliquis, Plavix, and aspirin, early stage Alzheimer dementia transferred from [facility] s/p unwitnessed fall with head strike at home found by wife on floor at 8am. Patient brought to urgent care by wife and had 5 staples to posterior scalp but was instructed to go to nearest ED.  CT head at [facility] showed R frontal IPH, SDH, SAH at 9:00. CTA stroke protocol not performed at [facility]. Patient BIB on 6L NC.  Pt GCS 15 on arrival, A&Ox2 on arrival. After initial assessment, patient noted to be vomiting enroute to CT scanner.   admitted 11/10 with prolonged hospitalization here.  being found to have right frontal IPH, 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  Extubated 11/13. Course complicated by Acute Kidney Injury, afib, EEG with epileptiform discharges. Patient was trasnferred to step down unit 11/23. TTE had shown possible vegetation on valve but repeat was not diagnostic and blood cultures were negative. Course further complicated by deep vein thrombosis or LUE brachial veins. Patient course further complicated by stroke found on MRI, WHIT recommended but family declined. He subsequently underwent cranioplasty on 12/7, drain removed 12/10. He had worsening of brain bleed 12/15, taken off anticoagulation and reversed. Patient then had acute decompensation on 12/21 for worsening mental status, vomiting, aspiration, and shock, requiring transfer to Intensive care unit for intubation, and pressors. Further complications including acute blood loss anemia requiring PRBCs, klebsiella pnuemonia, afib with RVR. He is now s/p extubation 12/25.      # acute respiratory failure  # aspiration pneumonia  Currently normoxic on room air  Worsening Leukocytosis  - Aspiration precautions  - Monitor for hypoxia  - Meropenem  - ID follow up    # Encephalopathy, multifactorial. ICH, CVA, ICU, possible delirium  - frequent reorientation  - Neurology consult    # Dysphagia  Currently NPO  - SLP evaluation  - Reinsert NGT if failed    # fungemia:   - caspofungin per ID     # Atrial Fibrillation  - Metoprolol IV  - No anticoagulation given ICH    # ICH s/p craniotomy and cranioplasty  abnormal EEG with risk of seizures  -  Levetiracetam 1000mg BID, recall neurology in am to discuss about lowering it per daughters request    neurosurgery following    # anemia: likely multifactorial    s/p PRBC    c/w PPI,     no intervention per GI.    # hypokalemia:   Replace  Check Mg, Phos    d/w girlfriend at bedside.

## 2023-12-27 NOTE — SWALLOW BEDSIDE ASSESSMENT ADULT - ORAL PHASE
absent initiation of bolus management; PO remaining in anterior sulcus necessitating removal via [place] suctioning
Decreased anterior-posterior movement of the bolus/Delayed oral transit time/Stasis in anterior sulcus/Stasis in lateral sulci/Palatal stasis/Lingual stasis

## 2023-12-27 NOTE — SWALLOW BEDSIDE ASSESSMENT ADULT - NS SPL SWALLOW CLINIC TRIAL FT
no further PO provided, oral diet initiation remains contraindicated
No further PO offered, oral diet initiation remains contraindicated

## 2023-12-27 NOTE — SWALLOW BEDSIDE ASSESSMENT ADULT - SLP GENERAL OBSERVATIONS
Recd lethargic in bed, A&A Ox0, minimal arousal, fleeting moment of eye opening w/max cues though not sustained, reduced cognition, 0/10 nonverbal pain, tolerating RA NAD, wife present for encounter
Recd awake/upright in bed, A&A Ox1, reduced cognition, confused, 0/10 pain pre/post, NGT, tolerating RA SpO2 94%, wife present for encounter, at least moderately dysarthric speech

## 2023-12-27 NOTE — PROCEDURE NOTE - ADDITIONAL PROCEDURE DETAILS
#18G 10CM 28CIRC BARD POWER GLIDE MIDLINE inserted with ultrasound guidance.   Good flash, ns flush right brachial vein.   Patient tolerated well.

## 2023-12-27 NOTE — RAPID RESPONSE TEAM SUMMARY - [id number]_GEN_ALL_CORE
PHYSICAL EXAM:  GENERAL: NAD frail   CHEST/LUNG: Clear to ausculation bilaterall  HEART: normal s1, s2  ABDOMEN: Soft, Nondistended; Bowel sounds present, rectal tube in place   EXTREMITIES: no edema   NERVOUS SYSTEM: arousals, minimally verbal (mumbles), eyes open but falls asleep easily  PHYSICAL EXAM:  GENERAL: NAD frail   CHEST/LUNG: Clear to ausculation bilaterall  HEART: normal s1, s2  ABDOMEN: Soft, Nondistended; Bowel sounds present, rectal tube in place   EXTREMITIES: no edema   NERVOUS SYSTEM: arousalable, minimally verbal (mumbles), eyes open but falls asleep easily

## 2023-12-27 NOTE — PROGRESS NOTE ADULT - SUBJECTIVE AND OBJECTIVE BOX
HPI:  HPI:  82y M with PMH HTN, CAD s/p stents, renal cell carcinoma status post left nephrectomy, bladder CA, BPH, CHF, LBBB, vertigo,  HLD, 5.5cm AAA without rupture post EVAR, paroxysmal A-fib on Eliquis, Plavix, and aspirin, early stage Alzheimer dementia transferred from [facility] s/p unwitnessed fall with head strike at home found by wife on floor at 8am. Patient brought to urgent care by wife and had 5 staples to posterior scalp but was instructed to go to nearest ED.  CT head at [place] showed R frontal IPH, SDH, SAH at 9:00. CTA stroke protocol not performed at [facility]. Patient BIB on 6L NC.  Pt GCS 15 on arrival, A&Ox2 on arrival. After initial assessment, patient noted to be vomiting enroute to CT scanner.          (10 Nov 2023 11:04)      INTERVAL HPI/OVERNIGHT EVENTS:  83y Male s/p __ seen lying comfortably in bed. Tolerating diet. Passing gas/BM. Voiding. [place] in with __ clear urine. Denies headache, weakness, numbness, n/v/d, fevers, chills, chest pain, SOB.     Vital Signs Last 24 Hrs  T(C): 36.4 (27 Dec 2023 07:31), Max: 37.4 (26 Dec 2023 11:18)  T(F): 97.5 (27 Dec 2023 07:31), Max: 99.3 (26 Dec 2023 11:18)  HR: 103 (27 Dec 2023 09:00) (87 - 113)  BP: 149/69 (27 Dec 2023 09:00) (131/74 - 162/94)  BP(mean): 92 (27 Dec 2023 09:00) (83 - 112)  RR: 25 (27 Dec 2023 09:00) (0 - 32)  SpO2: 94% (27 Dec 2023 09:00) (87% - 96%)    Parameters below as of 27 Dec 2023 08:00  Patient On (Oxygen Delivery Method): room air        PHYSICAL EXAM:  GENERAL: NAD, well-groomed  HEAD:  Atraumatic, normocephalic  DRAINS:   WOUND: Dressing clean dry intact  JOEY COMA SCORE: E- V- M- =       E: 4= opens eyes spontaneously 3= to voice 2= to noxious 1= no opening       V: 5= oriented 4= confused 3= inappropriate words 2= incomprehensible sounds 1= nonverbal 1T= intubated       M: 6= follows commands 5= localizes 4= withdraws 3= flexor posturing 2= extensor posturing 1= no movement  MENTAL STATUS: AAO x3; Awake/Comatose; Opens eyes spontaneously/to voice/to light touch/to noxious stimuli; Appropriately conversant without aphasia/Nonverbal; following simple commands/mimicking/not following commands  CRANIAL NERVES: Visual acuity normal for age, visual fields full to confrontation, PERRL. EOMI without nystagmus. Facial sensation intact V1-3 distribution b/l. Face symmetric w/ normal eye closure and smile, tongue midline. Hearing grossly intact. Speech clear. Head turning and shoulder shrug intact.   REFLEXES: PERRL. Corneals intact b/l. Gag intact. Cough intact. Oculocephalic reflex intact (Doll's eye). Negative Jason's b/l. Negative clonus b/l  MOTOR: strength 5/5 b/l upper and lower extremities  Uppers     Delt (C5/6)     Bicep (C5/6)     Wrist Extend (C6)     Tricep (C7)     HG (C8/T1)  R                     5/5                 5/5                         5/5                           5/5                   5/5  L                      5/5                 5/5                         5/5                           5/5                   5/5  Lowers      HF(L1/L2)     KE (L3)     DF (L4)     EHL (L5)     PF (S1)      R                     5/5              5/5           5/5           5/5            5/5  L                     5/5               5/5          5/5            5/5            5/5  SENSATION: grossly intact to light touch all extremities  COORDINATION: Gait intact; rapid alternating movements intact; heel to shin intact; no upper extremity dysmetria  CHEST/LUNG: Clear to auscultation bilaterally; no rales, rhonchi, wheezing, or rubs  HEART: +S1/+S2; Regular rate and rhythm; no murmurs, rubs, or gallops  ABDOMEN: Soft, nontender, nondistended; bowel sounds present all four quadrants  EXTREMITIES:  2+ peripheral pulses, no clubbing, cyanosis, or edema  SKIN: Warm, dry; no rashes or lesions    LABS:                        9.2    12.60 )-----------( 190      ( 27 Dec 2023 05:46 )             27.2     12-27    143  |  112<H>  |  28.1<H>  ----------------------------<  72  2.8<LL>   |  16.0<L>  |  0.92    Ca    8.4      27 Dec 2023 05:46  Phos  2.7     12-27  Mg     2.1     12-27    TPro  5.2<L>  /  Alb  1.9<L>  /  TBili  0.4  /  DBili  x   /  AST  34  /  ALT  23  /  AlkPhos  85  12-27      Urinalysis Basic - ( 27 Dec 2023 05:46 )    Color: x / Appearance: x / SG: x / pH: x  Gluc: 72 mg/dL / Ketone: x  / Bili: x / Urobili: x   Blood: x / Protein: x / Nitrite: x   Leuk Esterase: x / RBC: x / WBC x   Sq Epi: x / Non Sq Epi: x / Bacteria: x        12-26 @ 07:01 - 12-27 @ 07:00  --------------------------------------------------------  IN: 1071.1 mL / OUT: 2209 mL / NET: -1137.9 mL    12-27 @ 07:01 - 12-27 @ 10:02  --------------------------------------------------------  IN: 464 mL / OUT: 200 mL / NET: 264 mL        RADIOLOGY & ADDITIONAL TESTS:   HPI:  HPI:  82y M with PMH HTN, CAD s/p stents, renal cell carcinoma status post left nephrectomy, bladder CA, BPH, CHF, LBBB, vertigo,  HLD, 5.5cm AAA without rupture post EVAR, paroxysmal A-fib on Eliquis, Plavix, and aspirin, early stage Alzheimer dementia transferred from [facility] s/p unwitnessed fall with head strike at home found by wife on floor at 8am. Patient brought to urgent care by wife and had 5 staples to posterior scalp but was instructed to go to nearest ED.  CT head at [place] showed R frontal IPH, SDH, SAH at 9:00. CTA stroke protocol not performed at [facility]. Patient BIB on 6L NC.  Pt GCS 15 on arrival, A&Ox2 on arrival. After initial assessment, patient noted to be vomiting enroute to CT scanner.          (10 Nov 2023 11:04)      INTERVAL HPI/OVERNIGHT EVENTS:  83y Male s/p __ seen lying comfortably in bed. Tolerating diet. Passing gas/BM. Voiding. [place] in with __ clear urine. Denies headache, weakness, numbness, n/v/d, fevers, chills, chest pain, SOB.     Vital Signs Last 24 Hrs  T(C): 36.4 (27 Dec 2023 07:31), Max: 37.4 (26 Dec 2023 11:18)  T(F): 97.5 (27 Dec 2023 07:31), Max: 99.3 (26 Dec 2023 11:18)  HR: 103 (27 Dec 2023 09:00) (87 - 113)  BP: 149/69 (27 Dec 2023 09:00) (131/74 - 162/94)  BP(mean): 92 (27 Dec 2023 09:00) (83 - 112)  RR: 25 (27 Dec 2023 09:00) (0 - 32)  SpO2: 94% (27 Dec 2023 09:00) (87% - 96%)    Parameters below as of 27 Dec 2023 08:00  Patient On (Oxygen Delivery Method): room air        PHYSICAL EXAM:  GENERAL: NAD, well-groomed  HEAD:  Atraumatic, normocephalic  DRAINS:   WOUND: Dressing clean dry intact  JOEY COMA SCORE: E- V- M- =       E: 4= opens eyes spontaneously 3= to voice 2= to noxious 1= no opening       V: 5= oriented 4= confused 3= inappropriate words 2= incomprehensible sounds 1= nonverbal 1T= intubated       M: 6= follows commands 5= localizes 4= withdraws 3= flexor posturing 2= extensor posturing 1= no movement  MENTAL STATUS: AAO x3; Awake/Comatose; Opens eyes spontaneously/to voice/to light touch/to noxious stimuli; Appropriately conversant without aphasia/Nonverbal; following simple commands/mimicking/not following commands  CRANIAL NERVES: Visual acuity normal for age, visual fields full to confrontation, PERRL. EOMI without nystagmus. Facial sensation intact V1-3 distribution b/l. Face symmetric w/ normal eye closure and smile, tongue midline. Hearing grossly intact. Speech clear. Head turning and shoulder shrug intact.   REFLEXES: PERRL. Corneals intact b/l. Gag intact. Cough intact. Oculocephalic reflex intact (Doll's eye). Negative Jason's b/l. Negative clonus b/l  MOTOR: strength 5/5 b/l upper and lower extremities  Uppers     Delt (C5/6)     Bicep (C5/6)     Wrist Extend (C6)     Tricep (C7)     HG (C8/T1)  R                     5/5                 5/5                         5/5                           5/5                   5/5  L                      5/5                 5/5                         5/5                           5/5                   5/5  Lowers      HF(L1/L2)     KE (L3)     DF (L4)     EHL (L5)     PF (S1)      R                     5/5              5/5           5/5           5/5            5/5  L                     5/5               5/5          5/5            5/5            5/5  SENSATION: grossly intact to light touch all extremities  COORDINATION: Gait intact; rapid alternating movements intact; heel to shin intact; no upper extremity dysmetria  CHEST/LUNG: Clear to auscultation bilaterally; no rales, rhonchi, wheezing, or rubs  HEART: +S1/+S2; Regular rate and rhythm; no murmurs, rubs, or gallops  ABDOMEN: Soft, nontender, nondistended; bowel sounds present all four quadrants  EXTREMITIES:  2+ peripheral pulses, no clubbing, cyanosis, or edema  SKIN: Warm, dry; no rashes or lesions    LABS:                        9.2    12.60 )-----------( 190      ( 27 Dec 2023 05:46 )             27.2     12-27    143  |  112<H>  |  28.1<H>  ----------------------------<  72  2.8<LL>   |  16.0<L>  |  0.92    Ca    8.4      27 Dec 2023 05:46  Phos  2.7     12-27  Mg     2.1     12-27    TPro  5.2<L>  /  Alb  1.9<L>  /  TBili  0.4  /  DBili  x   /  AST  34  /  ALT  23  /  AlkPhos  85  12-27      Urinalysis Basic - ( 27 Dec 2023 05:46 )    Color: x / Appearance: x / SG: x / pH: x  Gluc: 72 mg/dL / Ketone: x  / Bili: x / Urobili: x   Blood: x / Protein: x / Nitrite: x   Leuk Esterase: x / RBC: x / WBC x   Sq Epi: x / Non Sq Epi: x / Bacteria: x        12-26 @ 07:01 - 12-27 @ 07:00  --------------------------------------------------------  IN: 1071.1 mL / OUT: 2209 mL / NET: -1137.9 mL    12-27 @ 07:01 - 12-27 @ 10:02  --------------------------------------------------------  IN: 464 mL / OUT: 200 mL / NET: 264 mL        RADIOLOGY & ADDITIONAL TESTS:   HPI:  82y M with PMH HTN, CAD s/p stents, renal cell carcinoma status post left nephrectomy, bladder CA, BPH, CHF, LBBB, vertigo,  HLD, 5.5cm AAA without rupture post EVAR, paroxysmal A-fib on Eliquis, Plavix, and aspirin, early stage Alzheimer dementia transferred from [facility] s/p unwitnessed fall with head strike at home found by wife on floor at 8am. Patient brought to urgent care by wife and had 5 staples to posterior scalp but was instructed to go to nearest ED.  CT head at [place] showed R frontal IPH, SDH, SAH at 9:00. CTA stroke protocol not performed at [facility]. Patient BIB on 6L NC.  Pt GCS 15 on arrival, A&Ox2 on arrival. After initial assessment, patient noted to be vomiting enroute to CT scanner.      (10 Nov 2023 11:04)    OVERNIGHT EVENTS:  12/25: Extubated   12/27: DG to SDU yesterday     Vital Signs Last 24 Hrs  T(C): 36.4 (27 Dec 2023 07:31), Max: 37.4 (26 Dec 2023 11:18)  T(F): 97.5 (27 Dec 2023 07:31), Max: 99.3 (26 Dec 2023 11:18)  HR: 103 (27 Dec 2023 09:00) (87 - 113)  BP: 149/69 (27 Dec 2023 09:00) (131/74 - 162/94)  BP(mean): 92 (27 Dec 2023 09:00) (83 - 112)  RR: 25 (27 Dec 2023 09:00) (0 - 32)  SpO2: 94% (27 Dec 2023 09:00) (87% - 96%)    Parameters below as of 27 Dec 2023 08:00  Patient On (Oxygen Delivery Method): room air        PHYSICAL EXAM:  GENERAL: NAD, well-groomed  HEAD:  Atraumatic, normocephalic  DRAINS:   WOUND: Dressing clean dry intact  JOEY COMA SCORE: E- V- M- =       E: 4= opens eyes spontaneously 3= to voice 2= to noxious 1= no opening       V: 5= oriented 4= confused 3= inappropriate words 2= incomprehensible sounds 1= nonverbal 1T= intubated       M: 6= follows commands 5= localizes 4= withdraws 3= flexor posturing 2= extensor posturing 1= no movement  MENTAL STATUS: AAO x3; Awake/Comatose; Opens eyes spontaneously/to voice/to light touch/to noxious stimuli; Appropriately conversant without aphasia/Nonverbal; following simple commands/mimicking/not following commands  CRANIAL NERVES: Visual acuity normal for age, visual fields full to confrontation, PERRL. EOMI without nystagmus. Facial sensation intact V1-3 distribution b/l. Face symmetric w/ normal eye closure and smile, tongue midline. Hearing grossly intact. Speech clear. Head turning and shoulder shrug intact.   REFLEXES: PERRL. Corneals intact b/l. Gag intact. Cough intact. Oculocephalic reflex intact (Doll's eye). Negative Jason's b/l. Negative clonus b/l  MOTOR: strength 5/5 b/l upper and lower extremities  Uppers     Delt (C5/6)     Bicep (C5/6)     Wrist Extend (C6)     Tricep (C7)     HG (C8/T1)  R                     5/5                 5/5                         5/5                           5/5                   5/5  L                      5/5                 5/5                         5/5                           5/5                   5/5  Lowers      HF(L1/L2)     KE (L3)     DF (L4)     EHL (L5)     PF (S1)      R                     5/5              5/5           5/5           5/5            5/5  L                     5/5               5/5          5/5            5/5            5/5  SENSATION: grossly intact to light touch all extremities  COORDINATION: Gait intact; rapid alternating movements intact; heel to shin intact; no upper extremity dysmetria  CHEST/LUNG: Clear to auscultation bilaterally; no rales, rhonchi, wheezing, or rubs  HEART: +S1/+S2; Regular rate and rhythm; no murmurs, rubs, or gallops  ABDOMEN: Soft, nontender, nondistended; bowel sounds present all four quadrants  EXTREMITIES:  2+ peripheral pulses, no clubbing, cyanosis, or edema  SKIN: Warm, dry; no rashes or lesions    LABS:                        9.2    12.60 )-----------( 190      ( 27 Dec 2023 05:46 )             27.2     12-27    143  |  112<H>  |  28.1<H>  ----------------------------<  72  2.8<LL>   |  16.0<L>  |  0.92    Ca    8.4      27 Dec 2023 05:46  Phos  2.7     12-27  Mg     2.1     12-27    TPro  5.2<L>  /  Alb  1.9<L>  /  TBili  0.4  /  DBili  x   /  AST  34  /  ALT  23  /  AlkPhos  85  12-27      Urinalysis Basic - ( 27 Dec 2023 05:46 )    Color: x / Appearance: x / SG: x / pH: x  Gluc: 72 mg/dL / Ketone: x  / Bili: x / Urobili: x   Blood: x / Protein: x / Nitrite: x   Leuk Esterase: x / RBC: x / WBC x   Sq Epi: x / Non Sq Epi: x / Bacteria: x        12-26 @ 07:01 - 12-27 @ 07:00  --------------------------------------------------------  IN: 1071.1 mL / OUT: 2209 mL / NET: -1137.9 mL    12-27 @ 07:01 - 12-27 @ 10:02  --------------------------------------------------------  IN: 464 mL / OUT: 200 mL / NET: 264 mL        RADIOLOGY & ADDITIONAL TESTS:   HPI:  82y M with PMH HTN, CAD s/p stents, renal cell carcinoma status post left nephrectomy, bladder CA, BPH, CHF, LBBB, vertigo,  HLD, 5.5cm AAA without rupture post EVAR, paroxysmal A-fib on Eliquis, Plavix, and aspirin, early stage Alzheimer dementia transferred from [facility] s/p unwitnessed fall with head strike at home found by wife on floor at 8am. Patient brought to urgent care by wife and had 5 staples to posterior scalp but was instructed to go to nearest ED.  CT head at [place] showed R frontal IPH, SDH, SAH at 9:00. CTA stroke protocol not performed at [facility]. Patient BIB on 6L NC.  Pt GCS 15 on arrival, A&Ox2 on arrival. After initial assessment, patient noted to be vomiting enroute to CT scanner.      (10 Nov 2023 11:04)    OVERNIGHT EVENTS:  12/25: Extubated   12/27: DG to SDU yesterday     Vital Signs Last 24 Hrs  T(C): 36.4 (27 Dec 2023 07:31), Max: 37.4 (26 Dec 2023 11:18)  T(F): 97.5 (27 Dec 2023 07:31), Max: 99.3 (26 Dec 2023 11:18)  HR: 103 (27 Dec 2023 09:00) (87 - 113)  BP: 149/69 (27 Dec 2023 09:00) (131/74 - 162/94)  BP(mean): 92 (27 Dec 2023 09:00) (83 - 112)  RR: 25 (27 Dec 2023 09:00) (0 - 32)  SpO2: 94% (27 Dec 2023 09:00) (87% - 96%)    Parameters below as of 27 Dec 2023 08:00  Patient On (Oxygen Delivery Method): room air        PHYSICAL EXAM:  GENERAL: NAD, well-groomed  HEAD:  Atraumatic, normocephalic  DRAINS:   WOUND: Dressing clean dry intact  JOEY COMA SCORE: E- V- M- =       E: 4= opens eyes spontaneously 3= to voice 2= to noxious 1= no opening       V: 5= oriented 4= confused 3= inappropriate words 2= incomprehensible sounds 1= nonverbal 1T= intubated       M: 6= follows commands 5= localizes 4= withdraws 3= flexor posturing 2= extensor posturing 1= no movement  MENTAL STATUS: AAO x3; Awake/Comatose; Opens eyes spontaneously/to voice/to light touch/to noxious stimuli; Appropriately conversant without aphasia/Nonverbal; following simple commands/mimicking/not following commands  CRANIAL NERVES: Visual acuity normal for age, visual fields full to confrontation, PERRL. EOMI without nystagmus. Facial sensation intact V1-3 distribution b/l. Face symmetric w/ normal eye closure and smile, tongue midline. Hearing grossly intact. Speech clear. Head turning and shoulder shrug intact.   REFLEXES: PERRL. Corneals intact b/l. Gag intact. Cough intact. Oculocephalic reflex intact (Doll's eye). Negative Jason's b/l. Negative clonus b/l  MOTOR: strength 5/5 b/l upper and lower extremities  Uppers     Delt (C5/6)     Bicep (C5/6)     Wrist Extend (C6)     Tricep (C7)     HG (C8/T1)  R                     5/5                 5/5                         5/5                           5/5                   5/5  L                      5/5                 5/5                         5/5                           5/5                   5/5  Lowers      HF(L1/L2)     KE (L3)     DF (L4)     EHL (L5)     PF (S1)      R                     5/5              5/5           5/5           5/5            5/5  L                     5/5               5/5          5/5            5/5            5/5  SENSATION: grossly intact to light touch all extremities  COORDINATION: Gait intact; rapid alternating movements intact; heel to shin intact; no upper extremity dysmetria  CHEST/LUNG: Clear to auscultation bilaterally; no rales, rhonchi, wheezing, or rubs  HEART: +S1/+S2; Regular rate and rhythm; no murmurs, rubs, or gallops  ABDOMEN: Soft, nontender, nondistended; bowel sounds present all four quadrants  EXTREMITIES:  2+ peripheral pulses, no clubbing, cyanosis, or edema  SKIN: Warm, dry; no rashes or lesions    LABS:                        9.2    12.60 )-----------( 190      ( 27 Dec 2023 05:46 )             27.2     12-27    143  |  112<H>  |  28.1<H>  ----------------------------<  72  2.8<LL>   |  16.0<L>  |  0.92    Ca    8.4      27 Dec 2023 05:46  Phos  2.7     12-27  Mg     2.1     12-27    TPro  5.2<L>  /  Alb  1.9<L>  /  TBili  0.4  /  DBili  x   /  AST  34  /  ALT  23  /  AlkPhos  85  12-27      Urinalysis Basic - ( 27 Dec 2023 05:46 )    Color: x / Appearance: x / SG: x / pH: x  Gluc: 72 mg/dL / Ketone: x  / Bili: x / Urobili: x   Blood: x / Protein: x / Nitrite: x   Leuk Esterase: x / RBC: x / WBC x   Sq Epi: x / Non Sq Epi: x / Bacteria: x        12-26 @ 07:01 - 12-27 @ 07:00  --------------------------------------------------------  IN: 1071.1 mL / OUT: 2209 mL / NET: -1137.9 mL    12-27 @ 07:01 - 12-27 @ 10:02  --------------------------------------------------------  IN: 464 mL / OUT: 200 mL / NET: 264 mL        RADIOLOGY & ADDITIONAL TESTS:   HPI:  82y M with PMH HTN, CAD s/p stents, renal cell carcinoma status post left nephrectomy, bladder CA, BPH, CHF, LBBB, vertigo,  HLD, 5.5cm AAA without rupture post EVAR, paroxysmal A-fib on Eliquis, Plavix, and aspirin, early stage Alzheimer dementia transferred from [facility] s/p unwitnessed fall with head strike at home found by wife on floor at 8am. Patient brought to urgent care by wife and had 5 staples to posterior scalp but was instructed to go to nearest ED.  CT head at [place] showed R frontal IPH, SDH, SAH at 9:00. CTA stroke protocol not performed at [facility]. Patient BIB on 6L NC.  Pt GCS 15 on arrival, A&Ox2 on arrival. After initial assessment, patient noted to be vomiting enroute to CT scanner.      (10 Nov 2023 11:04)    OVERNIGHT EVENTS:  12/25: Extubated   12/27: DG to SDU yesterday     Vital Signs Last 24 Hrs  T(C): 36.4 (27 Dec 2023 07:31), Max: 37.4 (26 Dec 2023 11:18)  T(F): 97.5 (27 Dec 2023 07:31), Max: 99.3 (26 Dec 2023 11:18)  HR: 103 (27 Dec 2023 09:00) (87 - 113)  BP: 149/69 (27 Dec 2023 09:00) (131/74 - 162/94)  BP(mean): 92 (27 Dec 2023 09:00) (83 - 112)  RR: 25 (27 Dec 2023 09:00) (0 - 32)  SpO2: 94% (27 Dec 2023 09:00) (87% - 96%)    Parameters below as of 27 Dec 2023 08:00  Patient On (Oxygen Delivery Method): room air        PHYSICAL EXAM:  GENERAL: NAD, temporal wasting   HEAD: s/p R cranioplasty. Incision clean and dry without drainage  JOEY COMA SCORE: E-4 V-4 (person only) M-6 =14  MENTAL STATUS: AO x1. Eyes open spontaneously Oriented to self. Follows commands on right  CRANIAL NERVES: PERRL. EOMI. Face grossly symmetric. Hearing appears intact. Speech clear  MOTOR: RUE able to bend at elbow and show two fingers. RLE wiggles toes to command, HF at least 2-3/5. LUE trace movement to peripheral noxious; LLE 0/5 today   SENSATION: as above to noxious      LABS:                        9.2    12.60 )-----------( 190      ( 27 Dec 2023 05:46 )             27.2     12-27    143  |  112<H>  |  28.1<H>  ----------------------------<  72  2.8<LL>   |  16.0<L>  |  0.92    Ca    8.4      27 Dec 2023 05:46  Phos  2.7     12-27  Mg     2.1     12-27    TPro  5.2<L>  /  Alb  1.9<L>  /  TBili  0.4  /  DBili  x   /  AST  34  /  ALT  23  /  AlkPhos  85  12-27      Urinalysis Basic - ( 27 Dec 2023 05:46 )    Color: x / Appearance: x / SG: x / pH: x  Gluc: 72 mg/dL / Ketone: x  / Bili: x / Urobili: x   Blood: x / Protein: x / Nitrite: x   Leuk Esterase: x / RBC: x / WBC x   Sq Epi: x / Non Sq Epi: x / Bacteria: x        12-26 @ 07:01 - 12-27 @ 07:00  --------------------------------------------------------  IN: 1071.1 mL / OUT: 2209 mL / NET: -1137.9 mL    12-27 @ 07:01 - 12-27 @ 10:02  --------------------------------------------------------  IN: 464 mL / OUT: 200 mL / NET: 264 mL        RADIOLOGY & ADDITIONAL TESTS:   HPI:  82y M with PMH HTN, CAD s/p stents, renal cell carcinoma status post left nephrectomy, bladder CA, BPH, CHF, LBBB, vertigo,  HLD, 5.5cm AAA without rupture post EVAR, paroxysmal A-fib on Eliquis, Plavix, and aspirin, early stage Alzheimer dementia transferred from [facility] s/p unwitnessed fall with head strike at home found by wife on floor at 8am. Patient brought to urgent care by wife and had 5 staples to posterior scalp but was instructed to go to nearest ED.  CT head at [place] showed R frontal IPH, SDH, SAH at 9:00. CTA stroke protocol not performed at [facility]. Patient BIB on 6L NC.  Pt GCS 15 on arrival, A&Ox2 on arrival. After initial assessment, patient noted to be vomiting enroute to CT scanner.      (10 Nov 2023 11:04)    OVERNIGHT EVENTS:  12/25: Extubated   12/27: DG to SDU yesterday     Vital Signs Last 24 Hrs  T(C): 36.4 (27 Dec 2023 07:31), Max: 37.4 (26 Dec 2023 11:18)  T(F): 97.5 (27 Dec 2023 07:31), Max: 99.3 (26 Dec 2023 11:18)  HR: 103 (27 Dec 2023 09:00) (87 - 113)  BP: 149/69 (27 Dec 2023 09:00) (131/74 - 162/94)  BP(mean): 92 (27 Dec 2023 09:00) (83 - 112)  RR: 25 (27 Dec 2023 09:00) (0 - 32)  SpO2: 94% (27 Dec 2023 09:00) (87% - 96%)    Parameters below as of 27 Dec 2023 08:00  Patient On (Oxygen Delivery Method): room air        PHYSICAL EXAM:  GENERAL: NAD, temporal wasting   HEAD: s/p R cranioplasty. Incision clean and dry without drainage  JOEY COMA SCORE: E-4 V-4 (person only) M-6 =14  MENTAL STATUS: AO x1. Eyes open spontaneously Oriented to self. Follows commands on right  CRANIAL NERVES: PERRL. EOMI. Face grossly symmetric. Hearing appears intact. Speech clear  MOTOR: RUE able to bend at elbow and show two fingers. RLE wiggles toes to command, HF at least 2-3/5. LUE trace movement to peripheral noxious; LLE 0/5 today   SENSATION: as above to noxious      LABS:                        9.2    12.60 )-----------( 190      ( 27 Dec 2023 05:46 )             27.2     12-27    143  |  112<H>  |  28.1<H>  ----------------------------<  72  2.8<LL>   |  16.0<L>  |  0.92    Ca    8.4      27 Dec 2023 05:46  Phos  2.7     12-27  Mg     2.1     12-27    TPro  5.2<L>  /  Alb  1.9<L>  /  TBili  0.4  /  DBili  x   /  AST  34  /  ALT  23  /  AlkPhos  85  12-27      Urinalysis Basic - ( 27 Dec 2023 05:46 )    Color: x / Appearance: x / SG: x / pH: x  Gluc: 72 mg/dL / Ketone: x  / Bili: x / Urobili: x   Blood: x / Protein: x / Nitrite: x   Leuk Esterase: x / RBC: x / WBC x   Sq Epi: x / Non Sq Epi: x / Bacteria: x        12-26 @ 07:01 - 12-27 @ 07:00  --------------------------------------------------------  IN: 1071.1 mL / OUT: 2209 mL / NET: -1137.9 mL    12-27 @ 07:01 - 12-27 @ 10:02  --------------------------------------------------------  IN: 464 mL / OUT: 200 mL / NET: 264 mL        RADIOLOGY & ADDITIONAL TESTS:

## 2023-12-27 NOTE — CHART NOTE - NSCHARTNOTEFT_GEN_A_CORE
[facility]                                        Neurology at [place]                                 Josh West, & Viral                                  [address]                                        [place], [place], [address]                                             [phone]          Addendum:     I spoke with the patient's daughter by telephone this evening.   She reports that since being on Keppra the patient has been more lethargic and agitated.   At one point it had been stopped and she felt he was better.    Agitation can be associated with Keppra and after a thorough discussion with her, we have elected to switch him to Vimpat.    I also explained that the overall prognosis is guarded given everything that is going on (per my previous note). [facility]                                        Neurology at [place]                                 Josh West, & Viral                                  [address]                                        [place], [place], [address]                                             [phone]          Addendum:     I spoke with the patient's daughter by telephone this evening.   She reports that since being on Keppra the patient has been more lethargic and agitated.   At one point it had been stopped and she felt he was better.    Agitation can be associated with Keppra and after a thorough discussion with her, we have elected to switch him to Vimpat.    I also explained that the overall prognosis is guarded given everything that is going on (per my previous note).

## 2023-12-27 NOTE — SWALLOW BEDSIDE ASSESSMENT ADULT - SLP PERTINENT HISTORY OF CURRENT PROBLEM
As per MD note, "83y M with PMH HTN, CAD s/p PCI, renal cell carcinoma status post left nephrectomy, bladder CA, BPH, CHF, LBBB, vertigo, HLD, 5.5cm AAA s/p post EVAR, paroxysmal afib on Eliquis, Plavix, and Aspirin, h/o HIT, early stage Alzheimer's dementia transferred from [place] 11/10/23 s/p fall found with multicompartmental ICH, s/p R craniectomy 11/10 and eventual cranioplasty 12/7/23. Hospital course complicated by afib with RVR, Klebsiella pneumonia, LUE DVT, multiple subsequent episodes of ICH after attempting to restart Eliquis, now with acute respiratory failure, aspiration pneumonia, shock, hematemesis. Downgraded today to SDU". As per MD note, "83y M with PMH HTN, CAD s/p PCI, renal cell carcinoma status post left nephrectomy, bladder CA, BPH, CHF, LBBB, vertigo, HLD, 5.5cm AAA s/p post EVAR, paroxysmal afib on Eliquis, Plavix, and Aspirin, h/o HIT, early stage Alzheimer's dementia transferred from [place] 11/10/23 s/p fall found with multicompartmental ICH, s/p R craniectomy 11/10 and eventual cranioplasty 12/7/23. Hospital course complicated by afib with RVR, Klebsiella pneumonia, LUE DVT, multiple subsequent episodes of ICH after attempting to restart Eliquis, now with acute respiratory failure, aspiration pneumonia, shock, hematemesis. Downgraded today to SDU".

## 2023-12-27 NOTE — SWALLOW BEDSIDE ASSESSMENT ADULT - ORAL PREPARATORY PHASE
Reduced oral grading/Anterior loss of bolus
Refuses to accept bolus into oral cavity/Reduced oral grading

## 2023-12-27 NOTE — PROCEDURE NOTE - NSINDICATIONS_GEN_A_CORE
arterial puncture to obtain ABG's/critical patient/monitoring purposes
critical patient/monitoring purposes
airway protection/critical patient/mental status change/respiratory distress
venous access
critical patient/CSF sampling/mental status change
critical illness/emergency venous access/hemodynamic monitoring/hypertonic/irritant infusion
venous access

## 2023-12-27 NOTE — SWALLOW BEDSIDE ASSESSMENT ADULT - PHARYNGEAL PHASE
increasing upper airway congestion post intake/Decreased laryngeal elevation/Wet vocal quality post oral intake/Cough post oral intake
NOREEN no swallow trigger elicited

## 2023-12-27 NOTE — PROGRESS NOTE ADULT - SUBJECTIVE AND OBJECTIVE BOX
[facility]                                        Neurology at [place]                                 Josh West, & Viral                                  [address]                                        [place], [place], [address]                                             [phone]        CC: intracranial hemorrhage     HPI:   The patient is an 83 year old man admitted 11/10/23 transferred from [facility] after presenting after a fall with head injury.   He was on anticoagulation for atrial fibrillation and CT showed intracranial hemorrhage.   he was transferred here for neurosurgery intervention.   He underwent right sided hemicraniectomy.  He was seen by Dr Moreno with the stroke team on 11/25/23 after brain MRI showed infarct.   In addition to the hemorrhages, the MRI showed tiny acute lacunar infarctions in the left cerebellar hemisphere and right parietal lobe, as well as around the surgical cavity.  The suspicion was that this was on an embolic basis secondary to atrial fibrillation and aortic valve echodensity.    Downgraded to medicine team 12/10, noted to be febrile with unremarkable work-up.  CT head obtained on 12/15 showed new increase in right frontal ICH. Upgraded again to neuro-ICU. Was given Andexxa for reversal. Repeat CT heads were noted as stable and patient was downgraded to medicine on 12/17.     Rapid Response was called on 12/21/23 for respiratory distress requiring intubation. He was transferred to the MICU service.   He was seen again by Dr Moreno 12/22/23 due to abnormal EEG findings of:  - Right anterior slowing and sharp-wave discharges  - Moderate generalized slowing.  - Right hemispheric breach artifact.  These were attributed to the intracranial hemorrhage and surgery and the recommendation was for continuing Keppra which at the time was at 1000 mg twice per day.   He was extubated 12/25/23 and transferred back to the medical service on 12/26/23.  Course has been complicated by aspiration pneumonia and fungemia.   ID service has been involved and patient remains on IV antibiotics and antifungals.   He has remained somnolent since transfer out of ICU.  The patient was seen by the palliative care service on 12/26/23.    Neurology called back today to reevaluate mental status as family attributing the lethargy to the Keppra.     ROS:   Denies headache or dizziness.  Denies chest pain.  Denies shortness of breath.    MEDICATIONS  (STANDING):  atorvastatin 80 milliGRAM(s) Oral at bedtime  caspofungin IVPB 50 milliGRAM(s) IV Intermittent every 24 hours  caspofungin IVPB      chlorhexidine 2% Cloths 1 Application(s) Topical <User Schedule>  doxazosin 2 milliGRAM(s) Oral at bedtime  folic acid 1 milliGRAM(s) Oral daily  lactated ringers 1000 milliLiter(s) (88 mL/Hr) IV Continuous <Continuous>  levETIRAcetam   Injectable 1000 milliGRAM(s) IV Push every 12 hours  meropenem Injectable 1000 milliGRAM(s) IV Push every 8 hours  metoprolol tartrate Injectable 5 milliGRAM(s) IV Push every 6 hours  Nephro-roma 1 Tablet(s) Oral daily  pantoprazole  Injectable 40 milliGRAM(s) IV Push two times a day    Vital Signs Last 24 Hrs  T(C): 36.8 (27 Dec 2023 13:23), Max: 37.1 (26 Dec 2023 15:00)  T(F): 98.3 (27 Dec 2023 13:23), Max: 98.8 (26 Dec 2023 15:00)  HR: 104 (27 Dec 2023 13:23) (87 - 113)  BP: 132/102 (27 Dec 2023 13:23) (131/74 - 162/94)  BP(mean): 112 (27 Dec 2023 13:23) (83 - 112)  RR: 26 (27 Dec 2023 13:23) (0 - 31)  SpO2: 98% (27 Dec 2023 13:23) (87% - 98%)    Parameters below as of 27 Dec 2023 13:23  Patient On (Oxygen Delivery Method): room air    Detailed Neurologic Exam:    Mental status: The patient is sleepy, but opens eyes readily to voice. He answers questions although is dysarthric. He follows instructions with right hand.     Cranial nerves: Pupils equal and react symmetrically to light. There is no visual field deficit to threat. Extraocular motion is full with no nystagmus. Facial sensation is intact. Facial musculature is asymmetric with a depression of the left nasolabial fold. Palate elevates symmetrically. Tongue is midline.    Motor: There is normal bulk and tone.  There is no tremor.  Strength grossly 5/5 in right UE. Left UE plegic.   Moves right LE slightly to stimuli. No movement of left LE.    Sensation: Decreased grimace to pin on the left.    Reflexes: 1+ throughout and plantar responses are flexor on the right and extensor on the left.    Cerebellar: Cannot be tested.     Labs:     12-27    143  |  112<H>  |  28.1<H>  ----------------------------<  72  2.8<LL>   |  16.0<L>  |  0.92    Ca    8.4      27 Dec 2023 05:46  Phos  2.7     12-27  Mg     2.1     12-27    TPro  5.2<L>  /  Alb  1.9<L>  /  TBili  0.4  /  DBili  x   /  AST  34  /  ALT  23  /  AlkPhos  85  12-27                            11.6   15.88 )-----------( 221      ( 27 Dec 2023 12:30 )             32.8       Rad:   Most recent CT head from 12/223 images reviewed.   Interval decrease in size of the simple density fluid collection, likely either a pseudomeningocele or seroma, superficial to the right-sided cranioplasty.  No other significant change.    Initial presurgical CT reviewed as well which showed large right lateral frontal intracranial hemorrhage.  Additional multifocal subarachnoid and subdural hemorrhage was present as well.      EEG:   Abnormal EEG   - Right anterior slowing and sharp-wave discharges  - Moderate generalized slowing.  - Right hemispheric breach artifact.                                 [facility]                                        Neurology at [place]                                 Jsoh West, & Viral                                  [address]                                        [place], [place], [address]                                             [phone]        CC: intracranial hemorrhage     HPI:   The patient is an 83 year old man admitted 11/10/23 transferred from [facility] after presenting after a fall with head injury.   He was on anticoagulation for atrial fibrillation and CT showed intracranial hemorrhage.   he was transferred here for neurosurgery intervention.   He underwent right sided hemicraniectomy.  He was seen by Dr Moreno with the stroke team on 11/25/23 after brain MRI showed infarct.   In addition to the hemorrhages, the MRI showed tiny acute lacunar infarctions in the left cerebellar hemisphere and right parietal lobe, as well as around the surgical cavity.  The suspicion was that this was on an embolic basis secondary to atrial fibrillation and aortic valve echodensity.    Downgraded to medicine team 12/10, noted to be febrile with unremarkable work-up.  CT head obtained on 12/15 showed new increase in right frontal ICH. Upgraded again to neuro-ICU. Was given Andexxa for reversal. Repeat CT heads were noted as stable and patient was downgraded to medicine on 12/17.     Rapid Response was called on 12/21/23 for respiratory distress requiring intubation. He was transferred to the MICU service.   He was seen again by Dr Moreno 12/22/23 due to abnormal EEG findings of:  - Right anterior slowing and sharp-wave discharges  - Moderate generalized slowing.  - Right hemispheric breach artifact.  These were attributed to the intracranial hemorrhage and surgery and the recommendation was for continuing Keppra which at the time was at 1000 mg twice per day.   He was extubated 12/25/23 and transferred back to the medical service on 12/26/23.  Course has been complicated by aspiration pneumonia and fungemia.   ID service has been involved and patient remains on IV antibiotics and antifungals.   He has remained somnolent since transfer out of ICU.  The patient was seen by the palliative care service on 12/26/23.    Neurology called back today to reevaluate mental status as family attributing the lethargy to the Keppra.     ROS:   Denies headache or dizziness.  Denies chest pain.  Denies shortness of breath.    MEDICATIONS  (STANDING):  atorvastatin 80 milliGRAM(s) Oral at bedtime  caspofungin IVPB 50 milliGRAM(s) IV Intermittent every 24 hours  caspofungin IVPB      chlorhexidine 2% Cloths 1 Application(s) Topical <User Schedule>  doxazosin 2 milliGRAM(s) Oral at bedtime  folic acid 1 milliGRAM(s) Oral daily  lactated ringers 1000 milliLiter(s) (88 mL/Hr) IV Continuous <Continuous>  levETIRAcetam   Injectable 1000 milliGRAM(s) IV Push every 12 hours  meropenem Injectable 1000 milliGRAM(s) IV Push every 8 hours  metoprolol tartrate Injectable 5 milliGRAM(s) IV Push every 6 hours  Nephro-roma 1 Tablet(s) Oral daily  pantoprazole  Injectable 40 milliGRAM(s) IV Push two times a day    Vital Signs Last 24 Hrs  T(C): 36.8 (27 Dec 2023 13:23), Max: 37.1 (26 Dec 2023 15:00)  T(F): 98.3 (27 Dec 2023 13:23), Max: 98.8 (26 Dec 2023 15:00)  HR: 104 (27 Dec 2023 13:23) (87 - 113)  BP: 132/102 (27 Dec 2023 13:23) (131/74 - 162/94)  BP(mean): 112 (27 Dec 2023 13:23) (83 - 112)  RR: 26 (27 Dec 2023 13:23) (0 - 31)  SpO2: 98% (27 Dec 2023 13:23) (87% - 98%)    Parameters below as of 27 Dec 2023 13:23  Patient On (Oxygen Delivery Method): room air    Detailed Neurologic Exam:    Mental status: The patient is sleepy, but opens eyes readily to voice. He answers questions although is dysarthric. He follows instructions with right hand.     Cranial nerves: Pupils equal and react symmetrically to light. There is no visual field deficit to threat. Extraocular motion is full with no nystagmus. Facial sensation is intact. Facial musculature is asymmetric with a depression of the left nasolabial fold. Palate elevates symmetrically. Tongue is midline.    Motor: There is normal bulk and tone.  There is no tremor.  Strength grossly 5/5 in right UE. Left UE plegic.   Moves right LE slightly to stimuli. No movement of left LE.    Sensation: Decreased grimace to pin on the left.    Reflexes: 1+ throughout and plantar responses are flexor on the right and extensor on the left.    Cerebellar: Cannot be tested.     Labs:     12-27    143  |  112<H>  |  28.1<H>  ----------------------------<  72  2.8<LL>   |  16.0<L>  |  0.92    Ca    8.4      27 Dec 2023 05:46  Phos  2.7     12-27  Mg     2.1     12-27    TPro  5.2<L>  /  Alb  1.9<L>  /  TBili  0.4  /  DBili  x   /  AST  34  /  ALT  23  /  AlkPhos  85  12-27                            11.6   15.88 )-----------( 221      ( 27 Dec 2023 12:30 )             32.8       Rad:   Most recent CT head from 12/223 images reviewed.   Interval decrease in size of the simple density fluid collection, likely either a pseudomeningocele or seroma, superficial to the right-sided cranioplasty.  No other significant change.    Initial presurgical CT reviewed as well which showed large right lateral frontal intracranial hemorrhage.  Additional multifocal subarachnoid and subdural hemorrhage was present as well.      EEG:   Abnormal EEG   - Right anterior slowing and sharp-wave discharges  - Moderate generalized slowing.  - Right hemispheric breach artifact.

## 2023-12-27 NOTE — CHART NOTE - NSCHARTNOTEFT_GEN_A_CORE
Family at bedside refusing vimpat  Concerned that patient is too lethargic, and expressing concern for kidney function and cardiac function.   Education provided on adverse outcomes of holding medication including risk of seizure, possible intubation, cardiac arrest.   Despite education family chooses to withhold vimpat  Seizure precautions in place  RN to notify of any acute change in pt status

## 2023-12-27 NOTE — PROGRESS NOTE ADULT - SUBJECTIVE AND OBJECTIVE BOX
HOSPITALIST PROGRESS NOTE    DEUCE BALL  [id number]  83yMale    Patient is a 83y old  Male who presents with a chief complaint of s/p unwitnessed fall with head strike (27 Dec 2023 10:02)      SUBJECTIVE:   Chart reviewed since last visit.  Patient seen and examined at bedside.      OBJECTIVE:  Vital Signs Last 24 Hrs  T(C): 37 (27 Dec 2023 11:37), Max: 37.1 (26 Dec 2023 15:00)  T(F): 98.6 (27 Dec 2023 11:37), Max: 98.8 (26 Dec 2023 15:00)  HR: 95 (27 Dec 2023 11:40) (87 - 113)  BP: 141/79 (27 Dec 2023 11:40) (131/74 - 162/94)  BP(mean): 97 (27 Dec 2023 11:40) (83 - 112)  RR: 24 (27 Dec 2023 11:40) (0 - 31)  SpO2: 97% (27 Dec 2023 11:40) (87% - 97%)    Parameters below as of 27 Dec 2023 11:40  Patient On (Oxygen Delivery Method): room air        PHYSICAL EXAMINATION  General:   HEENT:    NECK:    CVS:   RESP:    GI:    :   MSK:    CNS:    INTEG:    PSYCH:      MONITOR:  CAPILLARY BLOOD GLUCOSE      POCT Blood Glucose.: 70 mg/dL (27 Dec 2023 12:17)        I&O's Summary    26 Dec 2023 07:01  -  27 Dec 2023 07:00  --------------------------------------------------------  IN: 1071.1 mL / OUT: 2209 mL / NET: -1137.9 mL    27 Dec 2023 07:01  -  27 Dec 2023 13:44  --------------------------------------------------------  IN: 652 mL / OUT: 425 mL / NET: 227 mL                            11.6   15.88 )-----------( 221      ( 27 Dec 2023 12:30 )             32.8       12-27    143  |  112<H>  |  28.1<H>  ----------------------------<  72  2.8<LL>   |  16.0<L>  |  0.92    Ca    8.4      27 Dec 2023 05:46  Phos  2.7     12-27  Mg     2.1     12-27    TPro  5.2<L>  /  Alb  1.9<L>  /  TBili  0.4  /  DBili  x   /  AST  34  /  ALT  23  /  AlkPhos  85  12-27        Urinalysis Basic - ( 27 Dec 2023 05:46 )    Color: x / Appearance: x / SG: x / pH: x  Gluc: 72 mg/dL / Ketone: x  / Bili: x / Urobili: x   Blood: x / Protein: x / Nitrite: x   Leuk Esterase: x / RBC: x / WBC x   Sq Epi: x / Non Sq Epi: x / Bacteria: x        Culture:    TTE:    RADIOLOGY        MEDICATIONS  (STANDING):  atorvastatin 80 milliGRAM(s) Oral at bedtime  caspofungin IVPB 50 milliGRAM(s) IV Intermittent every 24 hours  caspofungin IVPB      chlorhexidine 2% Cloths 1 Application(s) Topical <User Schedule>  doxazosin 2 milliGRAM(s) Oral at bedtime  folic acid 1 milliGRAM(s) Oral daily  lactated ringers 1000 milliLiter(s) (88 mL/Hr) IV Continuous <Continuous>  levETIRAcetam   Injectable 1000 milliGRAM(s) IV Push every 12 hours  meropenem Injectable 1000 milliGRAM(s) IV Push every 8 hours  metoprolol tartrate Injectable 5 milliGRAM(s) IV Push every 6 hours  Nephro-roma 1 Tablet(s) Oral daily  pantoprazole  Injectable 40 milliGRAM(s) IV Push two times a day      MEDICATIONS  (PRN):  acetaminophen     Tablet .. 650 milliGRAM(s) Oral every 6 hours PRN Temp greater or equal to 38C (100.4F), Mild Pain (1 - 3)  artificial tears (preservative free) Ophthalmic Solution 1 Drop(s) Both EYES every 6 hours PRN Dry Eyes     HOSPITALIST PROGRESS NOTE    DEUCE BALL  [id number]  83yMale    Patient is a 83y old  Male who presents with a chief complaint of s/p unwitnessed fall with head strike (27 Dec 2023 10:02)      SUBJECTIVE:   Chart reviewed since last visit.  Patient seen and examined at bedside.      OBJECTIVE:  Vital Signs Last 24 Hrs  T(C): 37 (27 Dec 2023 11:37), Max: 37.1 (26 Dec 2023 15:00)  T(F): 98.6 (27 Dec 2023 11:37), Max: 98.8 (26 Dec 2023 15:00)  HR: 95 (27 Dec 2023 11:40) (87 - 113)  BP: 141/79 (27 Dec 2023 11:40) (131/74 - 162/94)  BP(mean): 97 (27 Dec 2023 11:40) (83 - 112)  RR: 24 (27 Dec 2023 11:40) (0 - 31)  SpO2: 97% (27 Dec 2023 11:40) (87% - 97%)    Parameters below as of 27 Dec 2023 11:40  Patient On (Oxygen Delivery Method): room air        PHYSICAL EXAMINATION  General:   HEENT:    NECK:    CVS:   RESP:    GI:    :   MSK:    CNS:    INTEG:    PSYCH:      MONITOR:  CAPILLARY BLOOD GLUCOSE      POCT Blood Glucose.: 70 mg/dL (27 Dec 2023 12:17)        I&O's Summary    26 Dec 2023 07:01  -  27 Dec 2023 07:00  --------------------------------------------------------  IN: 1071.1 mL / OUT: 2209 mL / NET: -1137.9 mL    27 Dec 2023 07:01  -  27 Dec 2023 13:44  --------------------------------------------------------  IN: 652 mL / OUT: 425 mL / NET: 227 mL                            11.6   15.88 )-----------( 221      ( 27 Dec 2023 12:30 )             32.8       12-27    143  |  112<H>  |  28.1<H>  ----------------------------<  72  2.8<LL>   |  16.0<L>  |  0.92    Ca    8.4      27 Dec 2023 05:46  Phos  2.7     12-27  Mg     2.1     12-27    TPro  5.2<L>  /  Alb  1.9<L>  /  TBili  0.4  /  DBili  x   /  AST  34  /  ALT  23  /  AlkPhos  85  12-27        Urinalysis Basic - ( 27 Dec 2023 05:46 )    Color: x / Appearance: x / SG: x / pH: x  Gluc: 72 mg/dL / Ketone: x  / Bili: x / Urobili: x   Blood: x / Protein: x / Nitrite: x   Leuk Esterase: x / RBC: x / WBC x   Sq Epi: x / Non Sq Epi: x / Bacteria: x        Culture:    TTE:    RADIOLOGY        MEDICATIONS  (STANDING):  atorvastatin 80 milliGRAM(s) Oral at bedtime  caspofungin IVPB 50 milliGRAM(s) IV Intermittent every 24 hours  caspofungin IVPB      chlorhexidine 2% Cloths 1 Application(s) Topical <User Schedule>  doxazosin 2 milliGRAM(s) Oral at bedtime  folic acid 1 milliGRAM(s) Oral daily  lactated ringers 1000 milliLiter(s) (88 mL/Hr) IV Continuous <Continuous>  levETIRAcetam   Injectable 1000 milliGRAM(s) IV Push every 12 hours  meropenem Injectable 1000 milliGRAM(s) IV Push every 8 hours  metoprolol tartrate Injectable 5 milliGRAM(s) IV Push every 6 hours  Nephro-roma 1 Tablet(s) Oral daily  pantoprazole  Injectable 40 milliGRAM(s) IV Push two times a day      MEDICATIONS  (PRN):  acetaminophen     Tablet .. 650 milliGRAM(s) Oral every 6 hours PRN Temp greater or equal to 38C (100.4F), Mild Pain (1 - 3)  artificial tears (preservative free) Ophthalmic Solution 1 Drop(s) Both EYES every 6 hours PRN Dry Eyes     HOSPITALIST PROGRESS NOTE    DEUCE LIZZY  [id number]  83yMale    Patient is a 83y old  Male who presents with a chief complaint of s/p unwitnessed fall with head strike (27 Dec 2023 10:02)      SUBJECTIVE:   Chart reviewed since last visit; discussed with Dr Garcia  Patient seen and examined at bedside ~ 10 am for SDH, CVA, bacteremia, fungemia, severe malnutrition, dysphagia  Nonverbal at that time as very somnolent - girlfriend of 30 years states he was talking in morning      OBJECTIVE:  Vital Signs Last 24 Hrs  T(C): 37 (27 Dec 2023 11:37), Max: 37.1 (26 Dec 2023 15:00)  T(F): 98.6 (27 Dec 2023 11:37), Max: 98.8 (26 Dec 2023 15:00)  HR: 95 (27 Dec 2023 11:40) (87 - 113)  BP: 141/79 (27 Dec 2023 11:40) (131/74 - 162/94)  BP(mean): 97 (27 Dec 2023 11:40) (83 - 112)  RR: 24 (27 Dec 2023 11:40) (0 - 31)  SpO2: 97% (27 Dec 2023 11:40) (87% - 97%)    Parameters below as of 27 Dec 2023 11:40  Patient On (Oxygen Delivery Method): room air        PHYSICAL EXAMINATION  General: Elderly male, chronically ill appearing, lying in bed  HEENT:  Right scalp scar well healed, Eyes close, barely opens upon commands  NECK:  SUpple  CVS: regular rate and rhythm S1 S2  RESP:  Poor efort  GI:  Soft nondistended nontender BS. Rectal tube with liquid stool  : Vallecillo (+), penile edema  MSK:  Bilateral LE VCD  CNS:  Obtunded at time of examination  INTEG:  dry skin  PSYCH:  obtunded    MONITOR:  CAPILLARY BLOOD GLUCOSE      POCT Blood Glucose.: 70 mg/dL (27 Dec 2023 12:17)        I&O's Summary    26 Dec 2023 07:01  -  27 Dec 2023 07:00  --------------------------------------------------------  IN: 1071.1 mL / OUT: 2209 mL / NET: -1137.9 mL    27 Dec 2023 07:01  -  27 Dec 2023 13:44  --------------------------------------------------------  IN: 652 mL / OUT: 425 mL / NET: 227 mL                            11.6   15.88 )-----------( 221      ( 27 Dec 2023 12:30 )             32.8       12-27    143  |  112<H>  |  28.1<H>  ----------------------------<  72  2.8<LL>   |  16.0<L>  |  0.92    Ca    8.4      27 Dec 2023 05:46  Phos  2.7     12-27  Mg     2.1     12-27    TPro  5.2<L>  /  Alb  1.9<L>  /  TBili  0.4  /  DBili  x   /  AST  34  /  ALT  23  /  AlkPhos  85  12-27        Urinalysis Basic - ( 27 Dec 2023 05:46 )    Color: x / Appearance: x / SG: x / pH: x  Gluc: 72 mg/dL / Ketone: x  / Bili: x / Urobili: x   Blood: x / Protein: x / Nitrite: x   Leuk Esterase: x / RBC: x / WBC x   Sq Epi: x / Non Sq Epi: x / Bacteria: x        Culture:    TTE:    RADIOLOGY        MEDICATIONS  (STANDING):  atorvastatin 80 milliGRAM(s) Oral at bedtime  caspofungin IVPB 50 milliGRAM(s) IV Intermittent every 24 hours  caspofungin IVPB      chlorhexidine 2% Cloths 1 Application(s) Topical <User Schedule>  doxazosin 2 milliGRAM(s) Oral at bedtime  folic acid 1 milliGRAM(s) Oral daily  lactated ringers 1000 milliLiter(s) (88 mL/Hr) IV Continuous <Continuous>  levETIRAcetam   Injectable 1000 milliGRAM(s) IV Push every 12 hours  meropenem Injectable 1000 milliGRAM(s) IV Push every 8 hours  metoprolol tartrate Injectable 5 milliGRAM(s) IV Push every 6 hours  Nephro-roma 1 Tablet(s) Oral daily  pantoprazole  Injectable 40 milliGRAM(s) IV Push two times a day      MEDICATIONS  (PRN):  acetaminophen     Tablet .. 650 milliGRAM(s) Oral every 6 hours PRN Temp greater or equal to 38C (100.4F), Mild Pain (1 - 3)  artificial tears (preservative free) Ophthalmic Solution 1 Drop(s) Both EYES every 6 hours PRN Dry Eyes     HOSPITALIST PROGRESS NOTE    DEUCE LIZZY  [id number]  83yMale    Patient is a 83y old  Male who presents with a chief complaint of s/p unwitnessed fall with head strike (27 Dec 2023 10:02)      SUBJECTIVE:   Chart reviewed since last visit; discussed with Dr Garcia  Patient seen and examined at bedside ~ 10 am for SDH, CVA, bacteremia, fungemia, severe malnutrition, dysphagia  Nonverbal at that time as very somnolent - girlfriend of 30 years states he was talking in morning      OBJECTIVE:  Vital Signs Last 24 Hrs  T(C): 37 (27 Dec 2023 11:37), Max: 37.1 (26 Dec 2023 15:00)  T(F): 98.6 (27 Dec 2023 11:37), Max: 98.8 (26 Dec 2023 15:00)  HR: 95 (27 Dec 2023 11:40) (87 - 113)  BP: 141/79 (27 Dec 2023 11:40) (131/74 - 162/94)  BP(mean): 97 (27 Dec 2023 11:40) (83 - 112)  RR: 24 (27 Dec 2023 11:40) (0 - 31)  SpO2: 97% (27 Dec 2023 11:40) (87% - 97%)    Parameters below as of 27 Dec 2023 11:40  Patient On (Oxygen Delivery Method): room air        PHYSICAL EXAMINATION  General: Elderly male, chronically ill appearing, lying in bed  HEENT:  Right scalp scar well healed, Eyes close, barely opens upon commands  NECK:  SUpple  CVS: regular rate and rhythm S1 S2  RESP:  Poor efort  GI:  Soft nondistended nontender BS. Rectal tube with liquid stool  : Vallecillo (+), penile edema  MSK:  Bilateral LE VCD  CNS:  Obtunded at time of examination  INTEG:  dry skin  PSYCH:  obtunded    MONITOR:  CAPILLARY BLOOD GLUCOSE      POCT Blood Glucose.: 70 mg/dL (27 Dec 2023 12:17)        I&O's Summary    26 Dec 2023 07:01  -  27 Dec 2023 07:00  --------------------------------------------------------  IN: 1071.1 mL / OUT: 2209 mL / NET: -1137.9 mL    27 Dec 2023 07:01  -  27 Dec 2023 13:44  --------------------------------------------------------  IN: 652 mL / OUT: 425 mL / NET: 227 mL                            11.6   15.88 )-----------( 221      ( 27 Dec 2023 12:30 )             32.8       12-27    143  |  112<H>  |  28.1<H>  ----------------------------<  72  2.8<LL>   |  16.0<L>  |  0.92    Ca    8.4      27 Dec 2023 05:46  Phos  2.7     12-27  Mg     2.1     12-27    TPro  5.2<L>  /  Alb  1.9<L>  /  TBili  0.4  /  DBili  x   /  AST  34  /  ALT  23  /  AlkPhos  85  12-27        Urinalysis Basic - ( 27 Dec 2023 05:46 )    Color: x / Appearance: x / SG: x / pH: x  Gluc: 72 mg/dL / Ketone: x  / Bili: x / Urobili: x   Blood: x / Protein: x / Nitrite: x   Leuk Esterase: x / RBC: x / WBC x   Sq Epi: x / Non Sq Epi: x / Bacteria: x        Culture:    TTE:    RADIOLOGY        MEDICATIONS  (STANDING):  atorvastatin 80 milliGRAM(s) Oral at bedtime  caspofungin IVPB 50 milliGRAM(s) IV Intermittent every 24 hours  caspofungin IVPB      chlorhexidine 2% Cloths 1 Application(s) Topical <User Schedule>  doxazosin 2 milliGRAM(s) Oral at bedtime  folic acid 1 milliGRAM(s) Oral daily  lactated ringers 1000 milliLiter(s) (88 mL/Hr) IV Continuous <Continuous>  levETIRAcetam   Injectable 1000 milliGRAM(s) IV Push every 12 hours  meropenem Injectable 1000 milliGRAM(s) IV Push every 8 hours  metoprolol tartrate Injectable 5 milliGRAM(s) IV Push every 6 hours  Nephro-roma 1 Tablet(s) Oral daily  pantoprazole  Injectable 40 milliGRAM(s) IV Push two times a day      MEDICATIONS  (PRN):  acetaminophen     Tablet .. 650 milliGRAM(s) Oral every 6 hours PRN Temp greater or equal to 38C (100.4F), Mild Pain (1 - 3)  artificial tears (preservative free) Ophthalmic Solution 1 Drop(s) Both EYES every 6 hours PRN Dry Eyes

## 2023-12-27 NOTE — PROCEDURE NOTE - NSINFORMCONSENT_GEN_A_CORE
This was an emergent procedure.
This was an emergent procedure.
Benefits, risks, and possible complications of procedure explained to patient/caregiver who verbalized understanding and gave verbal consent.
Benefits, risks, and possible complications of procedure explained to patient/caregiver who verbalized understanding and gave verbal consent.
Daughter Jennifer/Benefits, risks, and possible complications of procedure explained to patient/caregiver who verbalized understanding and gave verbal consent.
This was an emergent procedure.
This was an emergent procedure.

## 2023-12-27 NOTE — SWALLOW BEDSIDE ASSESSMENT ADULT - COMMENTS
82y M with PMH HTN, CAD s/p stents, renal cell carcinoma status post left nephrectomy, bladder CA, BPH, CHF, LBBB, vertigo,  HLD, 5.5cm AAA without rupture post EVAR, paroxysmal A-fib on Eliquis, Plavix, and aspirin, early stage Alzheimer dementia transferred from [facility] s/p unwitnessed fall with head strike at home found by wife on floor at 8am. Patient brought to urgent care by wife and had 5 staples to posterior scalp but was instructed to go to nearest ED.  CT head at [place] showed R frontal IPH, SDH, SAH at 9:00. CTA stroke protocol not performed at [facility]. Patient BIB on 6L NC.  Pt GCS 15 on arrival, A&Ox2 on arrival. After initial assessment, patient noted to be vomiting enroute to CT scanner.    (10 Nov 2023 11:04)    Neuro ICU COURSE:  Patient trauma B upon arrival to [facility], OR for right decompressive craniectomy with Dr. Larios, brought to NeuroICU intubated and hemodynamically stable. Nephrology consulted 11/11 for non-oliguric DONNIE, now improving. Patient febrile on POD #1, pan-cultured. TTE performed on 11, found to have calcified AV with mobile echodensity, cannot r/o vegetation, cardiology consulted. Blood cultures have had NGTD. Patient in rapid AF on 11/13, started on cardizem. Patient extubated on 11/13.
Pt known to this service from current admission, seen for MBSV on 12/4, rx made for easy to chew w/moderately thick liquids. Pt w/change in mentation & lethargy, ultimately downgraded to minced/moist w/moderately thick liquids. Pt requiring upgrade in level of care due to new ICH, subsequently intubated. Extubated & downgraded to SDU. This service reconsulted for eval to determine candidacy for oral diet re-initiation.

## 2023-12-27 NOTE — SWALLOW BEDSIDE ASSESSMENT ADULT - SWALLOW EVAL: RECOMMENDED DIET
NPO continue NGT feeds
NPO w/consideration for short-term non oral alternative means of nutrition/hydration as per Pt/family wishes

## 2023-12-28 LAB
ALBUMIN SERPL ELPH-MCNC: 2.2 G/DL — LOW (ref 3.3–5.2)
ALBUMIN SERPL ELPH-MCNC: 2.2 G/DL — LOW (ref 3.3–5.2)
ALP SERPL-CCNC: 92 U/L — SIGNIFICANT CHANGE UP (ref 40–120)
ALP SERPL-CCNC: 92 U/L — SIGNIFICANT CHANGE UP (ref 40–120)
ALT FLD-CCNC: 18 U/L — SIGNIFICANT CHANGE UP
ALT FLD-CCNC: 18 U/L — SIGNIFICANT CHANGE UP
ANION GAP SERPL CALC-SCNC: 13 MMOL/L — SIGNIFICANT CHANGE UP (ref 5–17)
ANION GAP SERPL CALC-SCNC: 13 MMOL/L — SIGNIFICANT CHANGE UP (ref 5–17)
AST SERPL-CCNC: 28 U/L — SIGNIFICANT CHANGE UP
AST SERPL-CCNC: 28 U/L — SIGNIFICANT CHANGE UP
BILIRUB SERPL-MCNC: 0.5 MG/DL — SIGNIFICANT CHANGE UP (ref 0.4–2)
BILIRUB SERPL-MCNC: 0.5 MG/DL — SIGNIFICANT CHANGE UP (ref 0.4–2)
BUN SERPL-MCNC: 26.5 MG/DL — HIGH (ref 8–20)
BUN SERPL-MCNC: 26.5 MG/DL — HIGH (ref 8–20)
CA-I BLDA-SCNC: 1.24 MMOL/L — SIGNIFICANT CHANGE UP (ref 1.15–1.33)
CA-I BLDA-SCNC: 1.24 MMOL/L — SIGNIFICANT CHANGE UP (ref 1.15–1.33)
CALCIUM SERPL-MCNC: 8.5 MG/DL — SIGNIFICANT CHANGE UP (ref 8.4–10.5)
CALCIUM SERPL-MCNC: 8.5 MG/DL — SIGNIFICANT CHANGE UP (ref 8.4–10.5)
CHLORIDE SERPL-SCNC: 113 MMOL/L — HIGH (ref 96–108)
CHLORIDE SERPL-SCNC: 113 MMOL/L — HIGH (ref 96–108)
CO2 SERPL-SCNC: 21 MMOL/L — LOW (ref 22–29)
CO2 SERPL-SCNC: 21 MMOL/L — LOW (ref 22–29)
CREAT SERPL-MCNC: 0.96 MG/DL — SIGNIFICANT CHANGE UP (ref 0.5–1.3)
CREAT SERPL-MCNC: 0.96 MG/DL — SIGNIFICANT CHANGE UP (ref 0.5–1.3)
EGFR: 79 ML/MIN/1.73M2 — SIGNIFICANT CHANGE UP
EGFR: 79 ML/MIN/1.73M2 — SIGNIFICANT CHANGE UP
GLUCOSE SERPL-MCNC: 117 MG/DL — HIGH (ref 70–99)
GLUCOSE SERPL-MCNC: 117 MG/DL — HIGH (ref 70–99)
HCT VFR BLD CALC: 26.3 % — LOW (ref 39–50)
HCT VFR BLD CALC: 26.3 % — LOW (ref 39–50)
HGB BLD-MCNC: 8.8 G/DL — LOW (ref 13–17)
HGB BLD-MCNC: 8.8 G/DL — LOW (ref 13–17)
MCHC RBC-ENTMCNC: 30.3 PG — SIGNIFICANT CHANGE UP (ref 27–34)
MCHC RBC-ENTMCNC: 30.3 PG — SIGNIFICANT CHANGE UP (ref 27–34)
MCHC RBC-ENTMCNC: 33.5 GM/DL — SIGNIFICANT CHANGE UP (ref 32–36)
MCHC RBC-ENTMCNC: 33.5 GM/DL — SIGNIFICANT CHANGE UP (ref 32–36)
MCV RBC AUTO: 90.7 FL — SIGNIFICANT CHANGE UP (ref 80–100)
MCV RBC AUTO: 90.7 FL — SIGNIFICANT CHANGE UP (ref 80–100)
PLATELET # BLD AUTO: 219 K/UL — SIGNIFICANT CHANGE UP (ref 150–400)
PLATELET # BLD AUTO: 219 K/UL — SIGNIFICANT CHANGE UP (ref 150–400)
POTASSIUM SERPL-MCNC: 2.8 MMOL/L — CRITICAL LOW (ref 3.5–5.3)
POTASSIUM SERPL-MCNC: 2.8 MMOL/L — CRITICAL LOW (ref 3.5–5.3)
POTASSIUM SERPL-SCNC: 2.8 MMOL/L — CRITICAL LOW (ref 3.5–5.3)
POTASSIUM SERPL-SCNC: 2.8 MMOL/L — CRITICAL LOW (ref 3.5–5.3)
PROT SERPL-MCNC: 5.1 G/DL — LOW (ref 6.6–8.7)
PROT SERPL-MCNC: 5.1 G/DL — LOW (ref 6.6–8.7)
RAPID RVP RESULT: SIGNIFICANT CHANGE UP
RAPID RVP RESULT: SIGNIFICANT CHANGE UP
RBC # BLD: 2.9 M/UL — LOW (ref 4.2–5.8)
RBC # BLD: 2.9 M/UL — LOW (ref 4.2–5.8)
RBC # FLD: 16.8 % — HIGH (ref 10.3–14.5)
RBC # FLD: 16.8 % — HIGH (ref 10.3–14.5)
SARS-COV-2 RNA SPEC QL NAA+PROBE: SIGNIFICANT CHANGE UP
SARS-COV-2 RNA SPEC QL NAA+PROBE: SIGNIFICANT CHANGE UP
SODIUM SERPL-SCNC: 147 MMOL/L — HIGH (ref 135–145)
SODIUM SERPL-SCNC: 147 MMOL/L — HIGH (ref 135–145)
WBC # BLD: 12.73 K/UL — HIGH (ref 3.8–10.5)
WBC # BLD: 12.73 K/UL — HIGH (ref 3.8–10.5)
WBC # FLD AUTO: 12.73 K/UL — HIGH (ref 3.8–10.5)
WBC # FLD AUTO: 12.73 K/UL — HIGH (ref 3.8–10.5)

## 2023-12-28 PROCEDURE — 99233 SBSQ HOSP IP/OBS HIGH 50: CPT

## 2023-12-28 PROCEDURE — 99232 SBSQ HOSP IP/OBS MODERATE 35: CPT

## 2023-12-28 PROCEDURE — 93010 ELECTROCARDIOGRAM REPORT: CPT

## 2023-12-28 PROCEDURE — 71045 X-RAY EXAM CHEST 1 VIEW: CPT | Mod: 26

## 2023-12-28 RX ORDER — SODIUM CHLORIDE 9 MG/ML
1000 INJECTION, SOLUTION INTRAVENOUS
Refills: 0 | Status: COMPLETED | OUTPATIENT
Start: 2023-12-28 | End: 2023-12-31

## 2023-12-28 RX ORDER — POTASSIUM CHLORIDE 20 MEQ
10 PACKET (EA) ORAL
Refills: 0 | Status: COMPLETED | OUTPATIENT
Start: 2023-12-28 | End: 2023-12-28

## 2023-12-28 RX ORDER — ACETAMINOPHEN 500 MG
1000 TABLET ORAL ONCE
Refills: 0 | Status: COMPLETED | OUTPATIENT
Start: 2023-12-28 | End: 2023-12-28

## 2023-12-28 RX ADMIN — Medication 1000 MILLIGRAM(S): at 05:45

## 2023-12-28 RX ADMIN — SODIUM CHLORIDE 88 MILLILITER(S): 9 INJECTION, SOLUTION INTRAVENOUS at 05:34

## 2023-12-28 RX ADMIN — Medication 5 MILLIGRAM(S): at 12:19

## 2023-12-28 RX ADMIN — PANTOPRAZOLE SODIUM 40 MILLIGRAM(S): 20 TABLET, DELAYED RELEASE ORAL at 17:04

## 2023-12-28 RX ADMIN — Medication 100 MILLIEQUIVALENT(S): at 12:19

## 2023-12-28 RX ADMIN — Medication 5 MILLIGRAM(S): at 17:04

## 2023-12-28 RX ADMIN — CASPOFUNGIN ACETATE 260 MILLIGRAM(S): 7 INJECTION, POWDER, LYOPHILIZED, FOR SOLUTION INTRAVENOUS at 12:18

## 2023-12-28 RX ADMIN — SODIUM CHLORIDE 83 MILLILITER(S): 9 INJECTION, SOLUTION INTRAVENOUS at 15:22

## 2023-12-28 RX ADMIN — Medication 100 MILLIEQUIVALENT(S): at 16:57

## 2023-12-28 RX ADMIN — Medication 100 MILLIEQUIVALENT(S): at 10:05

## 2023-12-28 RX ADMIN — PANTOPRAZOLE SODIUM 40 MILLIGRAM(S): 20 TABLET, DELAYED RELEASE ORAL at 05:59

## 2023-12-28 RX ADMIN — MEROPENEM 1000 MILLIGRAM(S): 1 INJECTION INTRAVENOUS at 05:59

## 2023-12-28 RX ADMIN — MEROPENEM 1000 MILLIGRAM(S): 1 INJECTION INTRAVENOUS at 22:18

## 2023-12-28 RX ADMIN — Medication 400 MILLIGRAM(S): at 04:45

## 2023-12-28 RX ADMIN — Medication 100 MILLIEQUIVALENT(S): at 09:06

## 2023-12-28 RX ADMIN — Medication 100 MILLIEQUIVALENT(S): at 15:22

## 2023-12-28 RX ADMIN — CHLORHEXIDINE GLUCONATE 1 APPLICATION(S): 213 SOLUTION TOPICAL at 06:03

## 2023-12-28 RX ADMIN — Medication 5 MILLIGRAM(S): at 22:17

## 2023-12-28 RX ADMIN — Medication 100 MILLIEQUIVALENT(S): at 17:40

## 2023-12-28 NOTE — PROGRESS NOTE ADULT - SUBJECTIVE AND OBJECTIVE BOX
[facility]                                                INFECTIOUS DISEASES  =======================================================                   Urielquintin Vivas#   Roman Gonzáles MD#    Shan Wilson MD*                           Jennie Huerta MD*   Stephany Segura MD*           Diplomates American Board of Internal Medicine & Infectious Diseases                  # [place] Office - Appt - Tel  [phone] Fax [phone]                * [place] Office - Appt - Tel [phone] Fax [phone]                                  Hospital Consult line:  [phone]  =======================================================      [id number]  DEUCE BALL   follow up for: Ifungemia      Patient admitted with right frontal IPH, 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 , LUE DVT, CVA, s/p cranioplasty on 12/7, c/b intracranial bleed, AMS, suspected aspiration and septic shock. Patietn was intubated and ETT cx + enterobacter, BCX + c.glabrata. cta/p showed stable endoleak-no intervention per vascular    pt extubated 12/25, transferred out of icu  12/27 RRT for SVT  12/28 low grade fever, pt does c/o abdominal discomfort  persistent watery stool in rectal tube  patient seen and examined.       I have personally reviewed the labs and data; pertinent labs and data are listed in this note; please see below.   ===================================================  REVIEW OF SYSTEMS:  limited    =======================================================  Allergies    penicillin (Rash)  heparin (Other (Severe))  penicillin (Hives)  Cipro (Other)  Levaquin (Other)  heparin (Other)    Intolerances    Antibiotics:  caspofungin IVPB      meropenem Injectable 1000 milliGRAM(s) IV Push every 12 hours    Other medications:  atorvastatin 80 milliGRAM(s) Oral at bedtime  chlorhexidine 2% Cloths 1 Application(s) Topical <User Schedule>  doxazosin 2 milliGRAM(s) Oral at bedtime  folic acid 1 milliGRAM(s) Oral daily  levETIRAcetam   Injectable 1000 milliGRAM(s) IV Push every 12 hours  metoprolol tartrate Injectable 5 milliGRAM(s) IV Push every 6 hours  Nephro-roma 1 Tablet(s) Oral daily  pantoprazole  Injectable 40 milliGRAM(s) IV Push two times a day    ======================================================  Physical Exam:  ============  Vital Signs Last 24 Hrs  T(C): 36.9 (28 Dec 2023 19:00), Max: 38.2 (28 Dec 2023 04:36)  T(F): 98.5 (28 Dec 2023 19:00), Max: 100.7 (28 Dec 2023 04:36)  HR: 106 (28 Dec 2023 20:00) (62 - 120)  BP: 117/86 (28 Dec 2023 20:00) (108/70 - 155/79)  BP(mean): 97 (28 Dec 2023 20:00) (81 - 104)  RR: 18 (28 Dec 2023 20:00) (16 - 37)  SpO2: 97% (28 Dec 2023 20:00) (94% - 97%)    Parameters below as of 28 Dec 2023 20:00  Patient On (Oxygen Delivery Method): room air        General:  No acute distress. cranial scar well healed  Eye: no conjunctival pallor, no scleral icterus  Neck: Supple, No lymphadenopathy.  Respiratory: Lungs are clear to auscultation, Respirations are non-labored.  Cardiovascular: Normal rate, Regular rhythm,  s1+s2 +mildred  Gastrointestinal: Soft, upper abdomen tender, Non-distended, Normal bowel sounds. rectal tube liquid stool  Integumentary: No rash.  Neurologic: Alert, Oriented x2, No focal deficits  Psychiatric: Appropriate mood & affect.  =======================================================  Labs:                                   8.8    12.73 )-----------( 219      ( 28 Dec 2023 05:22 )             26.3       12-28    147<H>  |  113<H>  |  26.5<H>  ----------------------------<  117<H>  2.8<LL>   |  21.0<L>  |  0.96    Ca    8.5      28 Dec 2023 05:22  Phos  2.6     12-27  Mg     2.1     12-27    TPro  5.1<L>  /  Alb  2.2<L>  /  TBili  0.5  /  DBili  x   /  AST  28  /  ALT  18  /  AlkPhos  92  12-28              Urinalysis Basic - ( 28 Dec 2023 05:22 )    Color: x / Appearance: x / SG: x / pH: x  Gluc: 117 mg/dL / Ketone: x  / Bili: x / Urobili: x   Blood: x / Protein: x / Nitrite: x   Leuk Esterase: x / RBC: x / WBC x   Sq Epi: x / Non Sq Epi: x / Bacteria: x                  CAPILLARY BLOOD GLUCOSE      POCT Blood Glucose.: 97 mg/dL (27 Dec 2023 23:33)              Culture - Blood (collected 12-25-23 @ 04:01)  Source: .Blood Blood  Preliminary Report (12-26-23 @ 10:01):    No growth at 24 hours    Culture - Blood (collected 12-25-23 @ 03:51)  Source: .Blood Blood  Preliminary Report (12-26-23 @ 10:01):    No growth at 24 hours    Culture - Blood (collected 12-22-23 @ 22:41)  Source: .Blood Blood-Peripheral  Gram Stain (12-25-23 @ 01:33):    Growth in aerobic bottle: Yeast like cells  Preliminary Report (12-25-23 @ 20:57):    Growth in aerobic bottle: Candida glabrata    Direct identification is available within approximately 3-5    hours either by Blood Panel Multiplexed PCR or Direct    MALDI-TOF. Details: [email or web address]  Organism: Blood Culture PCR (12-25-23 @ 02:54)  Organism: Blood Culture PCR (12-25-23 @ 02:54)    Sensitivities:      Method Type: PCR      -  Candida glabrata: Detec    Culture - Blood (collected 12-22-23 @ 22:30)  Source: .Blood Blood-Peripheral  Gram Stain (12-25-23 @ 11:16):    Growth in aerobic bottle: Yeast like cells  Final Report (12-26-23 @ 12:42):    Growth in aerobic bottle: Candida glabrata    See previous culture [id number]    Culture - Fungal, CSF (collected 12-21-23 @ 19:44)  Source: .CSF CSF  Preliminary Report (12-23-23 @ 15:03):    Culture is being performed. Fungal cultures are held for 4 weeks.    Culture - CSF with Gram Stain (collected 12-21-23 @ 19:44)  Source: .CSF CSF  Gram Stain (12-22-23 @ 02:07):    polymorphonuclear leukocytes seen    No organisms seen    by cytocentrifuge  Preliminary Report (12-22-23 @ 16:44):    No growth    Culture - Acid Fast - CSF (collected 12-21-23 @ 19:44)  Source: .CSF CSF  Preliminary Report (12-23-23 @ 15:08):    Culture is being performed.    Culture - Sputum (collected 12-21-23 @ 10:10)  Source: ET Tube ET Tube  Gram Stain (12-21-23 @ 23:16):    Few polymorphonuclear leukocytes per low power field    Rare Squamous epithelial cells per low power field    Moderate Yeast like cells per oil power field    Moderate Gram Positive Rods per oil power field    Few Gram positive cocci in pairs per oil powerfield    Rare Gram Negative Rods per oil power field  Final Report (12-23-23 @ 12:29):    Numerous Klebsiella aerogenes (Previously Enterobacter)    Normal Respiratory Isabell present  Organism: Klebsiella aerogenes (Previously Enterobacter) (12-23-23 @ 12:29)  Organism: Klebsiella aerogenes (Previously Enterobacter) (12-23-23 @ 12:29)    Sensitivities:      Method Type: CarbaR      -  Resistance Gene to Carbapenem: Nondet  Organism: Klebsiella aerogenes (Previously Enterobacter) (12-23-23 @ 12:29)    Sensitivities:      Method Type: ANDERS      -  Amoxicillin/Clavulanic Acid: R >16/8      -  Ampicillin: R >16 These ampicillin results predict results for amoxicillin      -  Ampicillin/Sulbactam: R 8/4      -  Aztreonam: S <=4      -  Cefazolin: R >16      -  Cefepime: S <=2      -  Cefoxitin: R >16      -  Ceftazidime/Avibactam: S <=4      -  Ceftriaxone: S <=1 Enterobacter cloacae, Klebsiella aerogenes, and Citrobacter freundii may develop resistance during prolonged therapy.      -  Ciprofloxacin: S <=0.25      -  Ertapenem: S <=0.5      -  Gentamicin: S <=2      -  Imipenem: I 2      -  Levofloxacin: S <=0.5      -  Meropenem: S <=1      -  Piperacillin/Tazobactam: S <=8      -  Tobramycin: S <=2      -  Trimethoprim/Sulfamethoxazole: S <=0.5/9.5    Culture - Blood (collected 12-20-23 @ 07:32)  Source: .Blood Blood  Final Report (12-25-23 @ 13:01):    No growth at 5 days    Culture - Blood (collected 12-20-23 @ 07:25)  Source: .Blood Blood  Final Report (12-25-23 @ 13:01):    No growth at 5 days    Culture - Blood (collected 12-10-23 @ 18:23)  Source: .Blood Blood-Peripheral  Final Report (12-15-23 @ 22:01):    No growth at 5 days    Culture - Blood (collected 12-10-23 @ 18:18)  Source: .Blood Blood-Peripheral  Final Report (12-15-23 @ 22:01):    No growth at 5 days    Culture - Sputum (collected 11-22-23 @ 00:08)  Source: ET Tube ET Tube  Gram Stain (11-22-23 @ 12:49):    Rare polymorphonuclear leukocytes per low power field    Moderate Yeast per oil power field  Final Report (11-26-23 @ 22:56):    Normal Respiratory Isabell present    Culture - Blood (collected 11-22-23 @ 00:08)  Source: .Blood Blood  Final Report (11-27-23 @ 07:01):    No growth at 5 days    Culture - Blood (collected 11-21-23 @ 23:45)  Source: .Blood Blood  Final Report (11-27-23 @ 07:01):    No growth at 5 days    Culture - Blood (collected 11-17-23 @ 11:28)  Source: .Blood Blood-Peripheral  Final Report (11-22-23 @ 15:09):    No growth at 5 days    Culture - Blood (collected 11-17-23 @ 11:26)  Source: .Blood Blood-Peripheral  Final Report (11-22-23 @ 15:09):    No growth at 5 days    Culture - Blood (collected 11-13-23 @ 07:40)  Source: .Blood Blood-Peripheral  Final Report (11-18-23 @ 14:01):    No growth at 5 days    Culture - Blood (collected 11-12-23 @ 17:55)  Source: .Blood Blood-Peripheral  Final Report (11-18-23 @ 02:00):    No growth at 5 days    Culture - Blood (collected 11-11-23 @ 16:19)  Source: .Blood Blood  Final Report (11-16-23 @ 23:00):    No growth at 5 days    Culture - Sputum (collected 11-11-23 @ 16:11)  Source: ET Tube ET Tube  Gram Stain (11-12-23 @ 11:50):    Numerous polymorphonuclear leukocytes per low power field    Few Squamous epithelial cells per low power field    Few Yeast like cells per oil power field    Numerous Gram Negative Rods per oil power field  Final Report (11-14-23 @ 08:57):    Numerous Klebsiella aerogenes (Previously Enterobacter)    Normal Respiratory Isabell present  Organism: Klebsiella aerogenes (Previously Enterobacter) (11-14-23 @ 08:57)  Organism: Klebsiella aerogenes (Previously Enterobacter) (11-14-23 @ 08:57)    Sensitivities:      Method Type: ANDERS      -  Amoxicillin/Clavulanic Acid: R >16/8      -  Ampicillin: R <=8 These ampicillin results predict results for amoxicillin      -  Ampicillin/Sulbactam: R <=4/2      -  Aztreonam: S <=4      -  Cefazolin: R >16      -  Cefepime: S <=2      -  Cefotaxime: S Enterobacter cloacae, Klebsiella aerogenes, and Citrobacter freundii may develop resistance during prolonged therapy.      -  Cefoxitin: R >16      -  Ceftazidime: S Enterobacter cloacae, Klebsiella aerogenes, and Citrobacter freundii may develop resistance during prolonged therapy.      -  Ceftriaxone: S <=1 Enterobacter cloacae, Klebsiella aerogenes, and Citrobacter freundii may develop resistance during prolonged therapy.      -  Ciprofloxacin: S <=0.25      -  Ertapenem: S <=0.5      -  Gentamicin: S <=2      -  Imipenem: S <=1      -  Levofloxacin: S <=0.5      -  Meropenem: S <=1      -  Piperacillin/Tazobactam: S <=8      -  Tobramycin: S <=2      -  Trimethoprim/Sulfamethoxazole: S <=0.5/9.5    Culture - Blood (collected 11-11-23 @ 15:50)  Source: .Blood Blood  Final Report (11-16-23 @ 23:00):    No growth at 5 days        < from: CT Angio Abdomen and Pelvis w/ IV Cont (12.21.23 @ 17:13) >  FINDINGS:    CHEST:    LUNGS AND LARGE AIRWAYS:  PLEURA:    Endotracheal tube, tip above the level michael.  The central airways are patent.    There are diffuse bilateral upper and lower lobe groundglass   centrilobular and tree-in-bud nodular opacities  There are bibasilar lower lobe airspace consolidations and left lingula   airspace consolidation.    There is a trace left pleural effusion.    VESSELS:  Atherosclerotic changes thoracic aorta with coronary artery calcification   and/or stent.  No central pulmonary embolism is noted.    HEART:   Heart size is normal.  Aortic and mitral valvular calcification. No pericardial effusion.    MEDIASTINUM AND CHERY:  No enlarged lymphadenopathy.    CHEST WALL AND LOWER NECK: Within normal limits.    ABDOMEN AND PELVIS:    LIVER: Within normal limits.  BILE DUCTS: Normal caliber.  GALLBLADDER: Dependent sludge or gallstones.  SPLEEN: Small, peripheral wedge-shaped defect posterior spleen, could   reflect infarct.  PANCREAS: Within normal limits.  ADRENALS: Within normal limits.  KIDNEYS/URETERS:  Left nephrectomy.  Multiple indeterminate right renal hypodense lesions.  No hydronephrosis.    BLADDER: Collapsed around Vallecillo catheter.  REPRODUCTIVE ORGANS: Mildly prominent prostate gland.    BOWEL:  Nasogastric tube within stomach which is nondistended.  There is a distended stomach to  Air filled colon extending to the rectum without bowel obstruction.  Rectal probe is present.  No extraluminal gas or congenital fluid collection.   PERITONEUM: No ascites.    VESSELS:  Aortobiiliac stent grafting right renal artery graft.    The native aneurysm sac measures 7.1 cm, stable from prior exam.  There is suggestion of contrast within the aneurysm sac near the proximal   stent graft attachment on arterial phase imaging, which becomes more   pronounced on delayed phase imaging (5:62, 63 and  10:114,115).    There is a dilated left common iliac artery measuring, 2 cm.  The iliac arteries are patent.    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  The superior mesenteric artery is patent.  The superior mesenteric vein and portal venous confluence is patent.    RETROPERITONEUM/LYMPH NODES:  No enlarged lymphadenopathy.  No retroperitoneal hematoma.    ABDOMINAL WALL:  Small bilateral fat-containing inguinal hernias.  Right femoral venous catheter.    BONES: Degenerative changes.    IMPRESSION:    Diffuse groundglass centrilobular and tree-in-bud nodular opacities which   is suggestive of infectious or aspiration bronchiolitis.    Bilateral lower lobe airspace consolidations may represent pneumonia   and/or atelectasis.    Findings suggestive of endoleak near the proximal stent graft attachment;   evaluation is somewhat limited without noncontrast exam.  Stable size of the aneurysm sac.    Diffuse colonic fecal retention with colonic distention.  No CT evidence for acute thromboembolic mesenteric ischemia.    Findings were discussed by telephone with Dr. Soriano at the time of   interpretation on 12/21/2023.    Other findings as discussed above.    --- End of Report ---        < end of copied text >                                              [facility]                                                INFECTIOUS DISEASES  =======================================================                   Urielquintin Vivas#   Roman Gonzáles MD#    Shan Wilson MD*                           Jennie Huerta MD*   Stephany Segura MD*           Diplomates American Board of Internal Medicine & Infectious Diseases                  # [place] Office - Appt - Tel  [phone] Fax [phone]                * [place] Office - Appt - Tel [phone] Fax [phone]                                  Hospital Consult line:  [phone]  =======================================================      [id number]  DEUCE BALL   follow up for: Ifungemia      Patient admitted with right frontal IPH, 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 , LUE DVT, CVA, s/p cranioplasty on 12/7, c/b intracranial bleed, AMS, suspected aspiration and septic shock. Patietn was intubated and ETT cx + enterobacter, BCX + c.glabrata. cta/p showed stable endoleak-no intervention per vascular    pt extubated 12/25, transferred out of icu  12/27 RRT for SVT  12/28 low grade fever, pt does c/o abdominal discomfort  persistent watery stool in rectal tube  patient seen and examined.       I have personally reviewed the labs and data; pertinent labs and data are listed in this note; please see below.   ===================================================  REVIEW OF SYSTEMS:  limited    =======================================================  Allergies    penicillin (Rash)  heparin (Other (Severe))  penicillin (Hives)  Cipro (Other)  Levaquin (Other)  heparin (Other)    Intolerances    Antibiotics:  caspofungin IVPB      meropenem Injectable 1000 milliGRAM(s) IV Push every 12 hours    Other medications:  atorvastatin 80 milliGRAM(s) Oral at bedtime  chlorhexidine 2% Cloths 1 Application(s) Topical <User Schedule>  doxazosin 2 milliGRAM(s) Oral at bedtime  folic acid 1 milliGRAM(s) Oral daily  levETIRAcetam   Injectable 1000 milliGRAM(s) IV Push every 12 hours  metoprolol tartrate Injectable 5 milliGRAM(s) IV Push every 6 hours  Nephro-roma 1 Tablet(s) Oral daily  pantoprazole  Injectable 40 milliGRAM(s) IV Push two times a day    ======================================================  Physical Exam:  ============  Vital Signs Last 24 Hrs  T(C): 36.9 (28 Dec 2023 19:00), Max: 38.2 (28 Dec 2023 04:36)  T(F): 98.5 (28 Dec 2023 19:00), Max: 100.7 (28 Dec 2023 04:36)  HR: 106 (28 Dec 2023 20:00) (62 - 120)  BP: 117/86 (28 Dec 2023 20:00) (108/70 - 155/79)  BP(mean): 97 (28 Dec 2023 20:00) (81 - 104)  RR: 18 (28 Dec 2023 20:00) (16 - 37)  SpO2: 97% (28 Dec 2023 20:00) (94% - 97%)    Parameters below as of 28 Dec 2023 20:00  Patient On (Oxygen Delivery Method): room air        General:  No acute distress. cranial scar well healed  Eye: no conjunctival pallor, no scleral icterus  Neck: Supple, No lymphadenopathy.  Respiratory: Lungs are clear to auscultation, Respirations are non-labored.  Cardiovascular: Normal rate, Regular rhythm,  s1+s2 +mildred  Gastrointestinal: Soft, upper abdomen tender, Non-distended, Normal bowel sounds. rectal tube liquid stool  Integumentary: No rash.  Neurologic: Alert, Oriented x2, No focal deficits  Psychiatric: Appropriate mood & affect.  =======================================================  Labs:                                   8.8    12.73 )-----------( 219      ( 28 Dec 2023 05:22 )             26.3       12-28    147<H>  |  113<H>  |  26.5<H>  ----------------------------<  117<H>  2.8<LL>   |  21.0<L>  |  0.96    Ca    8.5      28 Dec 2023 05:22  Phos  2.6     12-27  Mg     2.1     12-27    TPro  5.1<L>  /  Alb  2.2<L>  /  TBili  0.5  /  DBili  x   /  AST  28  /  ALT  18  /  AlkPhos  92  12-28              Urinalysis Basic - ( 28 Dec 2023 05:22 )    Color: x / Appearance: x / SG: x / pH: x  Gluc: 117 mg/dL / Ketone: x  / Bili: x / Urobili: x   Blood: x / Protein: x / Nitrite: x   Leuk Esterase: x / RBC: x / WBC x   Sq Epi: x / Non Sq Epi: x / Bacteria: x                  CAPILLARY BLOOD GLUCOSE      POCT Blood Glucose.: 97 mg/dL (27 Dec 2023 23:33)              Culture - Blood (collected 12-25-23 @ 04:01)  Source: .Blood Blood  Preliminary Report (12-26-23 @ 10:01):    No growth at 24 hours    Culture - Blood (collected 12-25-23 @ 03:51)  Source: .Blood Blood  Preliminary Report (12-26-23 @ 10:01):    No growth at 24 hours    Culture - Blood (collected 12-22-23 @ 22:41)  Source: .Blood Blood-Peripheral  Gram Stain (12-25-23 @ 01:33):    Growth in aerobic bottle: Yeast like cells  Preliminary Report (12-25-23 @ 20:57):    Growth in aerobic bottle: Candida glabrata    Direct identification is available within approximately 3-5    hours either by Blood Panel Multiplexed PCR or Direct    MALDI-TOF. Details: [email or web address]  Organism: Blood Culture PCR (12-25-23 @ 02:54)  Organism: Blood Culture PCR (12-25-23 @ 02:54)    Sensitivities:      Method Type: PCR      -  Candida glabrata: Detec    Culture - Blood (collected 12-22-23 @ 22:30)  Source: .Blood Blood-Peripheral  Gram Stain (12-25-23 @ 11:16):    Growth in aerobic bottle: Yeast like cells  Final Report (12-26-23 @ 12:42):    Growth in aerobic bottle: Candida glabrata    See previous culture [id number]    Culture - Fungal, CSF (collected 12-21-23 @ 19:44)  Source: .CSF CSF  Preliminary Report (12-23-23 @ 15:03):    Culture is being performed. Fungal cultures are held for 4 weeks.    Culture - CSF with Gram Stain (collected 12-21-23 @ 19:44)  Source: .CSF CSF  Gram Stain (12-22-23 @ 02:07):    polymorphonuclear leukocytes seen    No organisms seen    by cytocentrifuge  Preliminary Report (12-22-23 @ 16:44):    No growth    Culture - Acid Fast - CSF (collected 12-21-23 @ 19:44)  Source: .CSF CSF  Preliminary Report (12-23-23 @ 15:08):    Culture is being performed.    Culture - Sputum (collected 12-21-23 @ 10:10)  Source: ET Tube ET Tube  Gram Stain (12-21-23 @ 23:16):    Few polymorphonuclear leukocytes per low power field    Rare Squamous epithelial cells per low power field    Moderate Yeast like cells per oil power field    Moderate Gram Positive Rods per oil power field    Few Gram positive cocci in pairs per oil powerfield    Rare Gram Negative Rods per oil power field  Final Report (12-23-23 @ 12:29):    Numerous Klebsiella aerogenes (Previously Enterobacter)    Normal Respiratory Isabell present  Organism: Klebsiella aerogenes (Previously Enterobacter) (12-23-23 @ 12:29)  Organism: Klebsiella aerogenes (Previously Enterobacter) (12-23-23 @ 12:29)    Sensitivities:      Method Type: CarbaR      -  Resistance Gene to Carbapenem: Nondet  Organism: Klebsiella aerogenes (Previously Enterobacter) (12-23-23 @ 12:29)    Sensitivities:      Method Type: ANDERS      -  Amoxicillin/Clavulanic Acid: R >16/8      -  Ampicillin: R >16 These ampicillin results predict results for amoxicillin      -  Ampicillin/Sulbactam: R 8/4      -  Aztreonam: S <=4      -  Cefazolin: R >16      -  Cefepime: S <=2      -  Cefoxitin: R >16      -  Ceftazidime/Avibactam: S <=4      -  Ceftriaxone: S <=1 Enterobacter cloacae, Klebsiella aerogenes, and Citrobacter freundii may develop resistance during prolonged therapy.      -  Ciprofloxacin: S <=0.25      -  Ertapenem: S <=0.5      -  Gentamicin: S <=2      -  Imipenem: I 2      -  Levofloxacin: S <=0.5      -  Meropenem: S <=1      -  Piperacillin/Tazobactam: S <=8      -  Tobramycin: S <=2      -  Trimethoprim/Sulfamethoxazole: S <=0.5/9.5    Culture - Blood (collected 12-20-23 @ 07:32)  Source: .Blood Blood  Final Report (12-25-23 @ 13:01):    No growth at 5 days    Culture - Blood (collected 12-20-23 @ 07:25)  Source: .Blood Blood  Final Report (12-25-23 @ 13:01):    No growth at 5 days    Culture - Blood (collected 12-10-23 @ 18:23)  Source: .Blood Blood-Peripheral  Final Report (12-15-23 @ 22:01):    No growth at 5 days    Culture - Blood (collected 12-10-23 @ 18:18)  Source: .Blood Blood-Peripheral  Final Report (12-15-23 @ 22:01):    No growth at 5 days    Culture - Sputum (collected 11-22-23 @ 00:08)  Source: ET Tube ET Tube  Gram Stain (11-22-23 @ 12:49):    Rare polymorphonuclear leukocytes per low power field    Moderate Yeast per oil power field  Final Report (11-26-23 @ 22:56):    Normal Respiratory Isabell present    Culture - Blood (collected 11-22-23 @ 00:08)  Source: .Blood Blood  Final Report (11-27-23 @ 07:01):    No growth at 5 days    Culture - Blood (collected 11-21-23 @ 23:45)  Source: .Blood Blood  Final Report (11-27-23 @ 07:01):    No growth at 5 days    Culture - Blood (collected 11-17-23 @ 11:28)  Source: .Blood Blood-Peripheral  Final Report (11-22-23 @ 15:09):    No growth at 5 days    Culture - Blood (collected 11-17-23 @ 11:26)  Source: .Blood Blood-Peripheral  Final Report (11-22-23 @ 15:09):    No growth at 5 days    Culture - Blood (collected 11-13-23 @ 07:40)  Source: .Blood Blood-Peripheral  Final Report (11-18-23 @ 14:01):    No growth at 5 days    Culture - Blood (collected 11-12-23 @ 17:55)  Source: .Blood Blood-Peripheral  Final Report (11-18-23 @ 02:00):    No growth at 5 days    Culture - Blood (collected 11-11-23 @ 16:19)  Source: .Blood Blood  Final Report (11-16-23 @ 23:00):    No growth at 5 days    Culture - Sputum (collected 11-11-23 @ 16:11)  Source: ET Tube ET Tube  Gram Stain (11-12-23 @ 11:50):    Numerous polymorphonuclear leukocytes per low power field    Few Squamous epithelial cells per low power field    Few Yeast like cells per oil power field    Numerous Gram Negative Rods per oil power field  Final Report (11-14-23 @ 08:57):    Numerous Klebsiella aerogenes (Previously Enterobacter)    Normal Respiratory Isabell present  Organism: Klebsiella aerogenes (Previously Enterobacter) (11-14-23 @ 08:57)  Organism: Klebsiella aerogenes (Previously Enterobacter) (11-14-23 @ 08:57)    Sensitivities:      Method Type: ANDERS      -  Amoxicillin/Clavulanic Acid: R >16/8      -  Ampicillin: R <=8 These ampicillin results predict results for amoxicillin      -  Ampicillin/Sulbactam: R <=4/2      -  Aztreonam: S <=4      -  Cefazolin: R >16      -  Cefepime: S <=2      -  Cefotaxime: S Enterobacter cloacae, Klebsiella aerogenes, and Citrobacter freundii may develop resistance during prolonged therapy.      -  Cefoxitin: R >16      -  Ceftazidime: S Enterobacter cloacae, Klebsiella aerogenes, and Citrobacter freundii may develop resistance during prolonged therapy.      -  Ceftriaxone: S <=1 Enterobacter cloacae, Klebsiella aerogenes, and Citrobacter freundii may develop resistance during prolonged therapy.      -  Ciprofloxacin: S <=0.25      -  Ertapenem: S <=0.5      -  Gentamicin: S <=2      -  Imipenem: S <=1      -  Levofloxacin: S <=0.5      -  Meropenem: S <=1      -  Piperacillin/Tazobactam: S <=8      -  Tobramycin: S <=2      -  Trimethoprim/Sulfamethoxazole: S <=0.5/9.5    Culture - Blood (collected 11-11-23 @ 15:50)  Source: .Blood Blood  Final Report (11-16-23 @ 23:00):    No growth at 5 days        < from: CT Angio Abdomen and Pelvis w/ IV Cont (12.21.23 @ 17:13) >  FINDINGS:    CHEST:    LUNGS AND LARGE AIRWAYS:  PLEURA:    Endotracheal tube, tip above the level michael.  The central airways are patent.    There are diffuse bilateral upper and lower lobe groundglass   centrilobular and tree-in-bud nodular opacities  There are bibasilar lower lobe airspace consolidations and left lingula   airspace consolidation.    There is a trace left pleural effusion.    VESSELS:  Atherosclerotic changes thoracic aorta with coronary artery calcification   and/or stent.  No central pulmonary embolism is noted.    HEART:   Heart size is normal.  Aortic and mitral valvular calcification. No pericardial effusion.    MEDIASTINUM AND CHERY:  No enlarged lymphadenopathy.    CHEST WALL AND LOWER NECK: Within normal limits.    ABDOMEN AND PELVIS:    LIVER: Within normal limits.  BILE DUCTS: Normal caliber.  GALLBLADDER: Dependent sludge or gallstones.  SPLEEN: Small, peripheral wedge-shaped defect posterior spleen, could   reflect infarct.  PANCREAS: Within normal limits.  ADRENALS: Within normal limits.  KIDNEYS/URETERS:  Left nephrectomy.  Multiple indeterminate right renal hypodense lesions.  No hydronephrosis.    BLADDER: Collapsed around Vallec[place] catheter.  REPRODUCTIVE ORGANS: Mildly prominent prostate gland.    BOWEL:  Nasogastric tube within stomach which is nondistended.  There is a distended stomach to  Air filled colon extending to the rectum without bowel obstruction.  Rectal probe is present.  No extraluminal gas or congenital fluid collection.   PERITONEUM: No ascites.    VESSELS:  Aortobiiliac stent grafting right renal artery graft.    The native aneurysm sac measures 7.1 cm, stable from prior exam.  There is suggestion of contrast within the aneurysm sac near the proximal   stent graft attachment on arterial phase imaging, which becomes more   pronounced on delayed phase imaging (5:62, 63 and  10:114,115).    There is a dilated left common iliac artery measuring, 2 cm.  The iliac arteries are patent.    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  The superior mesenteric artery is patent.  The superior mesenteric vein and portal venous confluence is patent.    RETROPERITONEUM/LYMPH NODES:  No enlarged lymphadenopathy.  No retroperitoneal hematoma.    ABDOMINAL WALL:  Small bilateral fat-containing inguinal hernias.  Right femoral venous catheter.    BONES: Degenerative changes.    IMPRESSION:    Diffuse groundglass centrilobular and tree-in-bud nodular opacities which   is suggestive of infectious or aspiration bronchiolitis.    Bilateral lower lobe airspace consolidations may represent pneumonia   and/or atelectasis.    Findings suggestive of endoleak near the proximal stent graft attachment;   evaluation is somewhat limited without noncontrast exam.  Stable size of the aneurysm sac.    Diffuse colonic fecal retention with colonic distention.  No CT evidence for acute thromboembolic mesenteric ischemia.    Findings were discussed by telephone with Dr. Soriano at the time of   interpretation on 12/21/2023.    Other findings as discussed above.    --- End of Report ---        < end of copied text >

## 2023-12-28 NOTE — PROGRESS NOTE ADULT - NS MD NEURO CONDITIONS_ENCEPHAL
Neurological
Metabolic/Neurological
Neurological
Neurological

## 2023-12-28 NOTE — CHART NOTE - NSCHARTNOTEFT_GEN_A_CORE
Pt with fever 100.7  Resting comfortably in NAD  Remainder of vital signs stable, continues to be mildly tachycardic HR 110s  Actively being tx for fungemia and aspiration PNA  Ofirmev for fever  Repeat blood cultures ordered  RVP ordered  CXR ordered  Continue on merrem/capsofungin per ID

## 2023-12-28 NOTE — PROGRESS NOTE ADULT - ASSESSMENT
83y Male with large right hemisphere intracranial hemorrhage now status post hemicraniectomy for evacuation and relief of pressure.  Patient was on anticoagulation for atrial fibrillation prior to ICH.   MRI showed acute lacunar infarcts in addition to the hemorrhages.  This was felt to be of embolic origin although anticoagulation could not be restarted secondary to the hemorrhage.   EEG showed sharp waves from the area of the hematoma and the patient had been on Keppra.     Intracranial hemorrhage   Supportive care.   Control blood pressure.   Anticoagulation on hold.   EEG shows potential epileptogenic focus in the right anterior head region with underlying cerebral dysfunction and evidence for overlying skull defect.  Family now refusing to allow antiseizure drugs despite my explanation that he is at risk for seizures.  Seen by speech pathology and not tolerating PO diet.  Will need to consider PEG.    Infection.  Antibiotics/antifungals per ID.

## 2023-12-28 NOTE — PROVIDER CONTACT NOTE (CRITICAL VALUE NOTIFICATION) - ACTION/TREATMENT ORDERED:
Provider aware, No further orders given
potassium replacement
Potassium chloride IVPB
Please see emar for orders
see orders, potassium replacement

## 2023-12-28 NOTE — PROGRESS NOTE ADULT - ASSESSMENT
82y M with PMH HTN, CAD s/p stents, renal cell carcinoma status post left nephrectomy, bladder CA, BPH, CHF, LBBB, vertigo,  HLD, 5.5cm AAA without rupture post EVAR, paroxysmal A-fib on Eliquis, Plavix, and aspirin, early stage Alzheimer dementia transferred from [facility] s/p unwitnessed fall with head strike at home found by wife on floor at 8am. Patient brought to urgent care by wife and had 5 staples to posterior scalp but was instructed to go to nearest ED.  CT head at [place] showed R frontal IPH, SDH, SAH s/p decompressive craniectomy 11/10. Initial TTE with ?  AV vegetation vs calcification vs artifact, repeat TTE 11/21/23 nondiagnostic, family refused WHIT.   Treated for klebseila aerogenes tracheobronchitis,  LUE DVT, CVA, s/p cranioplasty on 12/7,   Hospital course c/b intracranial bleed, AMS, vomiting and suspected aspiration and septic shock. Patient  intubated 12/21 and ETT cx + enterobacter (I) to imi, BCX + c.glabrata. cta/p showed stable endoleak-no intervention per vascular  ID called for fungemia. Patient wih low grade temp and ongoing liquid stool in rectal tube      - c/w caspofungin  - f/u repeat BCX ngtd  - f/u BCX candida glabrata  f/u (s)  - complete meropenem x 7 days  - rt fem line-removed  - monitor diarrhea , would check C diff  - would repeat TTE  - ophthal eval when stable  - guarded prognosis  - palliative following    will f/u  d/w Dr Yanes, infection control               82y M with PMH HTN, CAD s/p stents, renal cell carcinoma status post left nephrectomy, bladder CA, BPH, CHF, LBBB, vertigo,  HLD, 5.5cm AAA without rupture post EVAR, paroxysmal A-fib on Eliquis, Plavix, and aspirin, early stage Alzheimer dementia transferred from [facility] s/p unwitnessed fall with head strike at home found by wife on floor at 8am. Patient brought to urgent care by wife and had 5 staples to posterior scalp but was instructed to go to nearest ED.  CT head at [place] showed R frontal IPH, SDH, SAH s/p decompressive craniectomy 11/10. Initial TTE with ?  AV vegetation vs calcification vs artifact, repeat TTE 11/21/23 nondiagnostic, family refused WHIT.   Treated for klebseila aerogenes tracheobronchitis,  LUE DVT, CVA, s/p cranioplasty on 12/7,   Hospital course c/b intracranial bleed, AMS, vomiting and suspected aspiration and septic shock. Patient  intubated 12/21 and ETT cx + enterobacter (I) to imi, BCX + c.glabrata. cta/p showed stable endoleak-no intervention per vascular  ID called for fungemia. Patient wih low grade temp and ongoing liquid stool in rectal tube      - c/w caspofungin  - f/u repeat BCX ngtd  - f/u BCX candida glabrata  f/u (s)  - complete meropenem x 7 days  - rt fem line-removed  - monitor diarrhea , would check C diff  - would repeat TTE  - ophthal eval when stable  - guarded prognosis  - palliative following    will f/u  d/w Dr Yanes, infection control

## 2023-12-28 NOTE — PROGRESS NOTE ADULT - SUBJECTIVE AND OBJECTIVE BOX
patient seen and examined    remains on antibiotics and antifungals       exam:  awakes easily to voice, follows simple commands with right upper extremity, o x name,   right arm strong  right lower wiggles toes, left lower 0/5 patient seen and examined    remains on antibiotics and antifungals     HPI:  83year old M with PMH HTN, CAD s/p stents, renal cell carcinoma status post left nephrectomy, bladder CA, BPH, CHF, LBBB, vertigo,  HLD, 5.5cm AAA without rupture post EVAR, paroxysmal A-fib on Eliquis, Plavix, and aspirin, early stage Alzheimer dementia transferred from [facility] s/p unwitnessed fall with head strike at home found by wife on floor at 8am. Patient brought to urgent care by wife and had 5 staples to posterior scalp but was instructed to go to nearest ED.  CT head at [place] showed R frontal IPH, SDH, SAH at 9:00. CTA stroke protocol not performed at [facility]. Patient BIB on 6L NC.  Pt GCS 15 on arrival, A&Ox2 on arrival. After initial assessment, patient noted to be vomiting enroute to CT scanner.       exam:  awakes easily to voice, follows simple commands with right upper extremity, o x name,   right arm strong  right lower wiggles toes  left hemiplegia      a/p:   patient seen and examined    remains on antibiotics and antifungals     HPI:  83year old M with PMH HTN, CAD s/p stents, renal cell carcinoma status post left nephrectomy, bladder CA, BPH, CHF, LBBB, vertigo,  HLD, 5.5cm AAA without rupture post EVAR, paroxysmal A-fib on Eliquis, Plavix, and aspirin, early stage Alzheimer dementia transferred from [facility] s/p unwitnessed fall with head strike at home found by wife on floor at 8am. Patient brought to urgent care by wife and had 5 staples to posterior scalp but was instructed to go to nearest ED.  CT head at [place] showed R frontal IPH, SDH, SAH at 9:00. CTA stroke protocol not performed at [facility]. Patient BIB on 6L NC.  Pt GCS 15 on arrival, A&Ox2 on arrival. After initial assessment, patient noted to be vomiting enroute to CT scanner.       exam:  awakes easily to voice, follows simple commands with right upper extremity, o x name,   right arm strong  right lower wiggles toes  left hemiplegia      a/p:   patient seen and examined    remains on antibiotics and antifungals     HPI:  83year old M with PMH HTN, CAD s/p stents, renal cell carcinoma status post left nephrectomy, bladder CA, BPH, CHF, LBBB, vertigo,  HLD, 5.5cm AAA without rupture post EVAR, paroxysmal A-fib on Eliquis, Plavix, and aspirin, early stage Alzheimer dementia transferred from [facility] s/p unwitnessed fall with head strike at home found by wife on floor at 8am. Patient brought to urgent care by wife and had 5 staples to posterior scalp but was instructed to go to nearest ED.  CT head at [place] showed R frontal IPH, SDH, SAH at 9:00. CTA stroke protocol not performed at [facility]. Patient BIB on 6L NC.  Pt GCS 15 on arrival, A&Ox2 on arrival. After initial assessment, patient noted to be vomiting enroute to CT scanner.       exam:  awakes easily to voice, follows simple commands with right upper extremity, o x name,   right arm strong  right lower wiggles toes  left hemiplegia      a/p:  continue current care per medicine/ID  AEDs per neurology  monitor incision for any concerns and notify neurosurgery with any drainage, worsening erythema.  patient seen and examined    remains on antibiotics and antifungals     HPI:  83year old M with PMH HTN, CAD s/p stents, renal cell carcinoma status post left nephrectomy, bladder CA, BPH, CHF, LBBB, vertigo,  HLD, 5.5cm AAA without rupture post EVAR, paroxysmal A-fib on Eliquis, Plavix, and aspirin, early stage Alzheimer dementia transferred from [facility] s/p unwitnessed fall with head strike at home found by wife on floor at 8am. Patient brought to urgent care by wife and had 5 staples to posterior scalp but was instructed to go to nearest ED.  CT head at [place] showed R frontal IPH, SDH, SAH at 9:00. CTA stroke protocol not performed at [facility]. Patient BIB on 6L NC.  Pt GCS 15 on arrival, A&Ox2 on arrival. After initial assessment, patient noted to be vomiting enroute to CT scanner.       exam:  awakes easily to voice, follows simple commands with right upper extremity, o x name,   right arm strong  right lower wiggles toes  left hemiplegia      a/p:  continue current care per medicine/ID  AEDs per neurology  monitor incision for any concerns and notify neurosurgery with any drainage, worsening erythema.

## 2023-12-28 NOTE — CHART NOTE - NSCHARTNOTEFT_GEN_A_CORE
Palliative care social work note.    SW met with patients s/o Aminta earlier today to provide support in coping with lengthy hospitalization and continued medical complexities. Patient remains full code. Aminta reports awaiting information on rehab facility choices. SW spoke with unit SW and case management regarding Aminta's further requesting to discuss placement options.

## 2023-12-28 NOTE — PROGRESS NOTE ADULT - ASSESSMENT
82y M with PMH HTN, CAD s/p stents, renal cell carcinoma status post left nephrectomy, bladder CA, BPH, CHF, LBBB, vertigo,  HLD, 5.5cm AAA without rupture post EVAR, paroxysmal A-fib on Eliquis, Plavix, and aspirin, early stage Alzheimer dementia transferred from [facility] s/p unwitnessed fall with head strike at home found by wife on floor at 8am. Patient brought to urgent care by wife and had 5 staples to posterior scalp but was instructed to go to nearest ED.  CT head at [place] showed R frontal IPH, SDH, SAH at 9:00. CTA stroke protocol not performed at [facility]. Patient BIB on 6L NC.  Pt GCS 15 on arrival, A&Ox2 on arrival. After initial assessment, patient noted to be vomiting enroute to CT scanner.   admitted 11/10 with prolonged hospitalization here.  being found to have right frontal IPH, 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  Extubated 11/13. Course complicated by Acute Kidney Injury, afib, EEG with epileptiform discharges. Patient was trasnferred to step down unit 11/23. TTE had shown possible vegetation on valve but repeat was not diagnostic and blood cultures were negative. Course further complicated by deep vein thrombosis or LUE brachial veins. Patient course further complicated by stroke found on MRI, WHIT recommended but family declined. He subsequently underwent cranioplasty on 12/7, drain removed 12/10. He had worsening of brain bleed 12/15, taken off anticoagulation and reversed. Patient then had acute decompensation on 12/21 for worsening mental status, vomiting, aspiration, and shock, requiring transfer to Intensive care unit for intubation, and pressors. Further complications including acute blood loss anemia requiring PRBCs, klebsiella pneumonia afib with RVR. He is now s/p extubation 12/25.        # Encephalopathy, multifactorial. ICH, CVA, ICU, possible delirium  Waxing, waning  - frequent reorientation  - Neurology follow up    # acute respiratory failure  # aspiration pneumonia  Currently normoxic on room air  Improving Leukocytosis, though febrile  - Aspiration precautions  - Monitor for hypoxia  - Meropenem  - ID follow up     Fever and Leukocytosis despite antibiotics and antifungal with diarrhea   - Check Stool for C. difficile as per discussion with ID    # Dysphagia  Currently NPO  - SLP evaluation  - Reinsert NGT if fail    # fungemia:   - caspofungin per ID     # Atrial Fibrillation  - Metoprolol IV  - No anticoagulation given ICH    # ICH s/p craniotomy and cranioplasty  abnormal EEG with risk of seizures  -  Levetiracetam changed to Lacosamide by Neurology after discussion with Neurology; family refusing AED  At risk for seizure and decompensation    # anemia: likely multifactorial    s/p PRBC    c/w PPI,     no intervention per GI.    # hypokalemia:   Replace  Check Mg, Phos    d/w girlfriend at bedside.     Guarded Prognosis  At risk for decompensation and return to MICU  Full Code    Palliative involved, follow up pending   82y M with PMH HTN, CAD s/p stents, renal cell carcinoma status post left nephrectomy, bladder CA, BPH, CHF, LBBB, vertigo,  HLD, 5.5cm AAA without rupture post EVAR, paroxysmal A-fib on Eliquis, Plavix, and aspirin, early stage Alzheimer dementia transferred from [facility] s/p unwitnessed fall with head strike at home found by wife on floor at 8am. Patient brought to urgent care by wife and had 5 staples to posterior scalp but was instructed to go to nearest ED.  CT head at [place] showed R frontal IPH, SDH, SAH at 9:00. CTA stroke protocol not performed at [facility]. Patient BIB on 6L NC.  Pt GCS 15 on arrival, A&Ox2 on arrival. After initial assessment, patient noted to be vomiting enroute to CT scanner.   admitted 11/10 with prolonged hospitalization here.  being found to have right frontal IPH, 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  Extubated 11/13. Course complicated by Acute Kidney Injury, afib, EEG with epileptiform discharges. Patient was trasnferred to step down unit 11/23. TTE had shown possible vegetation on valve but repeat was not diagnostic and blood cultures were negative. Course further complicated by deep vein thrombosis or LUE brachial veins. Patient course further complicated by stroke found on MRI, WHIT recommended but family declined. He subsequently underwent cranioplasty on 12/7, drain removed 12/10. He had worsening of brain bleed 12/15, taken off anticoagulation and reversed. Patient then had acute decompensation on 12/21 for worsening mental status, vomiting, aspiration, and shock, requiring transfer to Intensive care unit for intubation, and pressors. Further complications including acute blood loss anemia requiring PRBCs, klebsiella pneumonia afib with RVR. He is now s/p extubation 12/25.        # Encephalopathy, multifactorial. ICH, CVA, ICU, possible delirium  Waxing, waning  - frequent reorientation  - Neurology follow up    # acute respiratory failure  # aspiration pneumonia  Currently normoxic on room air  Improving Leukocytosis, though febrile  - Aspiration precautions  - Monitor for hypoxia  - Meropenem  - ID follow up     Fever and Leukocytosis despite antibiotics and antifungal with diarrhea   - Check Stool for C. difficile as per discussion with ID    # Dysphagia  Currently NPO  - SLP evaluation  - Reinsert NGT if fail    # fungemia:   - caspofungin per ID     # Atrial Fibrillation  - Metoprolol IV  - No anticoagulation given ICH    # ICH s/p craniotomy and cranioplasty  abnormal EEG with risk of seizures  -  Levetiracetam changed to Lacosamide by Neurology after discussion with Neurology; family refusing AED  At risk for seizure and decompensation    # anemia: likely multifactorial    s/p PRBC    c/w PPI,     no intervention per GI.    # hypokalemia:   Replace  Check Mg, Phos    d/w girlfriend at bedside.     Guarded Prognosis  At risk for decompensation and return to MICU  Full Code    Palliative involved, follow up pending

## 2023-12-28 NOTE — PROGRESS NOTE ADULT - SUBJECTIVE AND OBJECTIVE BOX
HOSPITALIST PROGRESS NOTE    DEUCE BALL  [id number]  83yMale    Patient is a 83y old  Male who presents with a chief complaint of s/p unwitnessed fall with head strike (28 Dec 2023 10:21)      SUBJECTIVE:   Chart reviewed since last visit.    Daughter refused for patient to get anti-epileptic last night    Patient seen and examined at bedside for fungemia, encephalopathy, dysphagia.  more awake than yesterday - mumbling response difficult to understand        OBJECTIVE:  Vital Signs Last 24 Hrs  T(C): 37.1 (28 Dec 2023 08:16), Max: 38.2 (28 Dec 2023 04:36)  T(F): 98.7 (28 Dec 2023 08:16), Max: 100.7 (28 Dec 2023 04:36)  HR: 93 (28 Dec 2023 12:00) (62 - 120)  BP: 119/97 (28 Dec 2023 12:00) (95/58 - 155/79)  BP(mean): 104 (28 Dec 2023 12:00) (71 - 104)  RR: 29 (28 Dec 2023 12:00) (19 - 37)  SpO2: 96% (28 Dec 2023 12:00) (93% - 96%)    Parameters below as of 28 Dec 2023 12:00  Patient On (Oxygen Delivery Method): room air          PHYSICAL EXAMINATION  General: Elderly male, chronically ill appearing, lying in bed  HEENT:  Right scalp scar well healed, Eyes close, barely opens upon commands. Left facial droop  NECK:  Supple  CVS: regular rate and rhythm S1 S2  RESP:  Poor effort  GI:  Soft nondistended nontender BS. Rectal tube with liquid stool  : Vallecillo (+), penile edema  MSK:  Bilateral LE VCD, LUE with brace  CNS:  Less somnolent today, Aphasic and dysarthric, Left hemiparesis  INTEG:  dry skin  PSYCH:  calm  MONITOR:  CAPILLARY BLOOD GLUCOSE      POCT Blood Glucose.: 97 mg/dL (27 Dec 2023 23:33)  POCT Blood Glucose.: 82 mg/dL (27 Dec 2023 19:03)        I&O's Summary    27 Dec 2023 07:01  -  28 Dec 2023 07:00  --------------------------------------------------------  IN: 2172 mL / OUT: 2850 mL / NET: -678 mL    28 Dec 2023 07:01  -  28 Dec 2023 14:06  --------------------------------------------------------  IN: 1176 mL / OUT: 750 mL / NET: 426 mL                            8.8    12.73 )-----------( 219      ( 28 Dec 2023 05:22 )             26.3       12-28    147<H>  |  113<H>  |  26.5<H>  ----------------------------<  117<H>  2.8<LL>   |  21.0<L>  |  0.96    Ca    8.5      28 Dec 2023 05:22  Phos  2.6     12-27  Mg     2.1     12-27    TPro  5.1<L>  /  Alb  2.2<L>  /  TBili  0.5  /  DBili  x   /  AST  28  /  ALT  18  /  AlkPhos  92  12-28        Urinalysis Basic - ( 28 Dec 2023 05:22 )    Color: x / Appearance: x / SG: x / pH: x  Gluc: 117 mg/dL / Ketone: x  / Bili: x / Urobili: x   Blood: x / Protein: x / Nitrite: x   Leuk Esterase: x / RBC: x / WBC x   Sq Epi: x / Non Sq Epi: x / Bacteria: x        Culture:    TTE:    RADIOLOGY        MEDICATIONS  (STANDING):  atorvastatin 80 milliGRAM(s) Oral at bedtime  caspofungin IVPB 50 milliGRAM(s) IV Intermittent every 24 hours  caspofungin IVPB      chlorhexidine 2% Cloths 1 Application(s) Topical <User Schedule>  dextrose 5% + lactated ringers. 1000 milliLiter(s) (88 mL/Hr) IV Continuous <Continuous>  doxazosin 2 milliGRAM(s) Oral at bedtime  folic acid 1 milliGRAM(s) Oral daily  lacosamide IVPB 150 milliGRAM(s) IV Intermittent every 12 hours  meropenem Injectable 1000 milliGRAM(s) IV Push every 8 hours  metoprolol tartrate Injectable 5 milliGRAM(s) IV Push every 6 hours  Nephro-roma 1 Tablet(s) Oral daily  pantoprazole  Injectable 40 milliGRAM(s) IV Push two times a day      MEDICATIONS  (PRN):  acetaminophen     Tablet .. 650 milliGRAM(s) Oral every 6 hours PRN Temp greater or equal to 38C (100.4F), Mild Pain (1 - 3)  artificial tears (preservative free) Ophthalmic Solution 1 Drop(s) Both EYES every 6 hours PRN Dry Eyes     HOSPITALIST PROGRESS NOTE    DEUCE BALL  [id number]  83yMale    Patient is a 83y old  Male who presents with a chief complaint of s/p unwitnessed fall with head strike (28 Dec 2023 10:21)      SUBJECTIVE:   Chart reviewed since last visit.    Daughter refused for patient to get anti-epileptic last night    Patient seen and examined at bedside for fungemia, encephalopathy, dysphagia.  more awake than yesterday - mumbling response difficult to understand        OBJECTIVE:  Vital Signs Last 24 Hrs  T(C): 37.1 (28 Dec 2023 08:16), Max: 38.2 (28 Dec 2023 04:36)  T(F): 98.7 (28 Dec 2023 08:16), Max: 100.7 (28 Dec 2023 04:36)  HR: 93 (28 Dec 2023 12:00) (62 - 120)  BP: 119/97 (28 Dec 2023 12:00) (95/58 - 155/79)  BP(mean): 104 (28 Dec 2023 12:00) (71 - 104)  RR: 29 (28 Dec 2023 12:00) (19 - 37)  SpO2: 96% (28 Dec 2023 12:00) (93% - 96%)    Parameters below as of 28 Dec 2023 12:00  Patient On (Oxygen Delivery Method): room air          PHYSICAL EXAMINATION  General: Elderly male, chronically ill appearing, lying in bed  HEENT:  Right scalp scar well healed, Eyes close, barely opens upon commands. Left facial droop  NECK:  Supple  CVS: regular rate and rhythm S1 S2  RESP:  Poor effort  GI:  Soft nondistended nontender BS. Rectal tube with liquid stool  : Vallecillo (+), penile edema  MSK:  Bilateral LE VCD, LUE with brace  CNS:  Less somnolent today, Aphasic and dysarthric, Left hemiparesis  INTEG:  dry skin  PSYCH:  calm  MONITOR:  CAPILLARY BLOOD GLUCOSE      POCT Blood Glucose.: 97 mg/dL (27 Dec 2023 23:33)  POCT Blood Glucose.: 82 mg/dL (27 Dec 2023 19:03)        I&O's Summary    27 Dec 2023 07:01  -  28 Dec 2023 07:00  --------------------------------------------------------  IN: 2172 mL / OUT: 2850 mL / NET: -678 mL    28 Dec 2023 07:01  -  28 Dec 2023 14:06  --------------------------------------------------------  IN: 1176 mL / OUT: 750 mL / NET: 426 mL                            8.8    12.73 )-----------( 219      ( 28 Dec 2023 05:22 )             26.3       12-28    147<H>  |  113<H>  |  26.5<H>  ----------------------------<  117<H>  2.8<LL>   |  21.0<L>  |  0.96    Ca    8.5      28 Dec 2023 05:22  Phos  2.6     12-27  Mg     2.1     12-27    TPro  5.1<L>  /  Alb  2.2<L>  /  TBili  0.5  /  DBili  x   /  AST  28  /  ALT  18  /  AlkPhos  92  12-28        Urinalysis Basic - ( 28 Dec 2023 05:22 )    Color: x / Appearance: x / SG: x / pH: x  Gluc: 117 mg/dL / Ketone: x  / Bili: x / Urobili: x   Blood: x / Protein: x / Nitrite: x   Leuk Esterase: x / RBC: x / WBC x   Sq Epi: x / Non Sq Epi: x / Bacteria: x        Culture:    TTE:    RADIOLOGY        MEDICATIONS  (STANDING):  atorvastatin 80 milliGRAM(s) Oral at bedtime  caspofungin IVPB 50 milliGRAM(s) IV Intermittent every 24 hours  caspofungin IVPB      chlorhexidine 2% Cloths 1 Application(s) Topical <User Schedule>  dextrose 5% + lactated ringers. 1000 milliLiter(s) (88 mL/Hr) IV Continuous <Continuous>  doxazosin 2 milliGRAM(s) Oral at bedtime  folic acid 1 milliGRAM(s) Oral daily  lacosamide IVPB 150 milliGRAM(s) IV Intermittent every 12 hours  meropenem Injectable 1000 milliGRAM(s) IV Push every 8 hours  metoprolol tartrate Injectable 5 milliGRAM(s) IV Push every 6 hours  Nephro-roma 1 Tablet(s) Oral daily  pantoprazole  Injectable 40 milliGRAM(s) IV Push two times a day      MEDICATIONS  (PRN):  acetaminophen     Tablet .. 650 milliGRAM(s) Oral every 6 hours PRN Temp greater or equal to 38C (100.4F), Mild Pain (1 - 3)  artificial tears (preservative free) Ophthalmic Solution 1 Drop(s) Both EYES every 6 hours PRN Dry Eyes

## 2023-12-28 NOTE — PROGRESS NOTE ADULT - SUBJECTIVE AND OBJECTIVE BOX
[facility]                                        Neurology at [place]                                 Josh West, & Viral                                  [address]                                        [place], [place], [address]                                             [phone]        CC: intracranial hemorrhage     HPI:   The patient is an 83 year old man admitted 11/10/23 transferred from [facility] after presenting after a fall with head injury.   He was on anticoagulation for atrial fibrillation and CT showed intracranial hemorrhage.   he was transferred here for neurosurgery intervention.   He underwent right sided hemicraniectomy.  He was seen by Dr Moreno with the stroke team on 11/25/23 after brain MRI showed infarct.   In addition to the hemorrhages, the MRI showed tiny acute lacunar infarctions in the left cerebellar hemisphere and right parietal lobe, as well as around the surgical cavity.  The suspicion was that this was on an embolic basis secondary to atrial fibrillation and aortic valve echodensity.    Downgraded to medicine team 12/10, noted to be febrile with unremarkable work-up.  CT head obtained on 12/15 showed new increase in right frontal ICH. Upgraded again to neuro-ICU. Was given Andexxa for reversal. Repeat CT heads were noted as stable and patient was downgraded to medicine on 12/17.     Rapid Response was called on 12/21/23 for respiratory distress requiring intubation. He was transferred to the MICU service.   He was seen again by Dr Moreno 12/22/23 due to abnormal EEG findings of:  - Right anterior slowing and sharp-wave discharges  - Moderate generalized slowing.  - Right hemispheric breach artifact.  These were attributed to the intracranial hemorrhage and surgery and the recommendation was for continuing Keppra which at the time was at 1000 mg twice per day.   He was extubated 12/25/23 and transferred back to the medical service on 12/26/23.  Course has been complicated by aspiration pneumonia and fungemia.   ID service has been involved and patient remains on IV antibiotics and antifungals.   He has remained somnolent since transfer out of ICU.  The patient was seen by the palliative care service on 12/26/23.    Neurology called back today to reevaluate mental status as family attributing the lethargy to the Keppra.   After discussing with patient's daughter, I had switched him to Vimpat secondary to reported agitation on Keppra.   Family now reportedly refusing Vimpat.    Interim history:  Remains on 3 [place].     ROS:   Denies headache or dizziness.  Denies chest pain.  Denies shortness of breath.    MEDICATIONS  (STANDING):  atorvastatin 80 milliGRAM(s) Oral at bedtime  caspofungin IVPB 50 milliGRAM(s) IV Intermittent every 24 hours  caspofungin IVPB      chlorhexidine 2% Cloths 1 Application(s) Topical <User Schedule>  dextrose 5% + lactated ringers. 1000 milliLiter(s) (88 mL/Hr) IV Continuous <Continuous>  doxazosin 2 milliGRAM(s) Oral at bedtime  folic acid 1 milliGRAM(s) Oral daily  lacosamide IVPB 150 milliGRAM(s) IV Intermittent every 12 hours  meropenem Injectable 1000 milliGRAM(s) IV Push every 8 hours  metoprolol tartrate Injectable 5 milliGRAM(s) IV Push every 6 hours  Nephro-roma 1 Tablet(s) Oral daily  pantoprazole  Injectable 40 milliGRAM(s) IV Push two times a day  potassium chloride  10 mEq/100 mL IVPB 10 milliEquivalent(s) IV Intermittent every 1 hour    Vital Signs Last 24 Hrs  T(C): 37.1 (28 Dec 2023 08:16), Max: 38.2 (28 Dec 2023 04:36)  T(F): 98.7 (28 Dec 2023 08:16), Max: 100.7 (28 Dec 2023 04:36)  HR: 120 (28 Dec 2023 08:00) (62 - 120)  BP: 155/79 (28 Dec 2023 08:00) (94/83 - 160/77)  BP(mean): 100 (28 Dec 2023 08:00) (71 - 112)  RR: 37 (28 Dec 2023 08:00) (15 - 37)  SpO2: 95% (28 Dec 2023 08:00) (93% - 98%)    Parameters below as of 28 Dec 2023 08:00  Patient On (Oxygen Delivery Method): room air    Detailed Neurologic Exam:    Mental status: The patient opens eyes readily to voice. He answers questions although is dysarthric. He follows instructions with right hand.     Cranial nerves: Pupils equal and react symmetrically to light. There is no visual field deficit to threat. Extraocular motion is full with no nystagmus. Facial sensation is intact. Facial musculature is asymmetric with a depression of the left nasolabial fold. Palate elevates symmetrically. Tongue is midline.    Motor: There is normal bulk and tone.  There is no tremor.  Strength grossly 5/5 in right UE. Left UE plegic.   Moves distal right LE slightly to stimuli. No movement of left LE.    Sensation: Decreased grimace to pin on the left.    Reflexes: 1+ throughout and plantar responses are flexor on the right and extensor on the left.    Cerebellar: Cannot be tested.     Labs:     12-28    147<H>  |  113<H>  |  26.5<H>  ----------------------------<  117<H>  2.8<LL>   |  21.0<L>  |  0.96    Ca    8.5      28 Dec 2023 05:22  Phos  2.6     12-27  Mg     2.1     12-27    TPro  5.1<L>  /  Alb  2.2<L>  /  TBili  0.5  /  DBili  x   /  AST  28  /  ALT  18  /  AlkPhos  92  12-28                            8.8    12.73 )-----------( 219      ( 28 Dec 2023 05:22 )             26.3                                          [facility]                                        Neurology at [place]                                 Josh West, & Viral                                  [address]                                        [place], [place], [address]                                             [phone]        CC: intracranial hemorrhage     HPI:   The patient is an 83 year old man admitted 11/10/23 transferred from [facility] after presenting after a fall with head injury.   He was on anticoagulation for atrial fibrillation and CT showed intracranial hemorrhage.   he was transferred here for neurosurgery intervention.   He underwent right sided hemicraniectomy.  He was seen by Dr Moreno with the stroke team on 11/25/23 after brain MRI showed infarct.   In addition to the hemorrhages, the MRI showed tiny acute lacunar infarctions in the left cerebellar hemisphere and right parietal lobe, as well as around the surgical cavity.  The suspicion was that this was on an embolic basis secondary to atrial fibrillation and aortic valve echodensity.    Downgraded to medicine team 12/10, noted to be febrile with unremarkable work-up.  CT head obtained on 12/15 showed new increase in right frontal ICH. Upgraded again to neuro-ICU. Was given Andexxa for reversal. Repeat CT heads were noted as stable and patient was downgraded to medicine on 12/17.     Rapid Response was called on 12/21/23 for respiratory distress requiring intubation. He was transferred to the MICU service.   He was seen again by Dr Moreno 12/22/23 due to abnormal EEG findings of:  - Right anterior slowing and sharp-wave discharges  - Moderate generalized slowing.  - Right hemispheric breach artifact.  These were attributed to the intracranial hemorrhage and surgery and the recommendation was for continuing Keppra which at the time was at 1000 mg twice per day.   He was extubated 12/25/23 and transferred back to the medical service on 12/26/23.  Course has been complicated by aspiration pneumonia and fungemia.   ID service has been involved and patient remains on IV antibiotics and antifungals.   He has remained somnolent since transfer out of ICU.  The patient was seen by the palliative care service on 12/26/23.    Neurology called back today to reevaluate mental status as family attributing the lethargy to the Keppra.   After discussing with patient's daughter, I had switched him to Vimpat secondary to reported agitation on Keppra.   Family now reportedly refusing Vimpat.    Interim history:  Remains on 3 [place].     ROS:   Denies headache or dizziness.  Denies chest pain.  Denies shortness of breath.    MEDICATIONS  (STANDING):  atorvastatin 80 milliGRAM(s) Oral at bedtime  caspofungin IVPB 50 milliGRAM(s) IV Intermittent every 24 hours  caspofungin IVPB      chlorhexidine 2% Cloths 1 Application(s) Topical <User Schedule>  dextrose 5% + lactated ringers. 1000 milliLiter(s) (88 mL/Hr) IV Continuous <Continuous>  doxazosin 2 milliGRAM(s) Oral at bedtime  folic acid 1 milliGRAM(s) Oral daily  lacosamide IVPB 150 milliGRAM(s) IV Intermittent every 12 hours  meropenem Injectable 1000 milliGRAM(s) IV Push every 8 hours  metoprolol tartrate Injectable 5 milliGRAM(s) IV Push every 6 hours  Nephro-roma 1 Tablet(s) Oral daily  pantoprazole  Injectable 40 milliGRAM(s) IV Push two times a day  potassium chloride  10 mEq/100 mL IVPB 10 milliEquivalent(s) IV Intermittent every 1 hour    Vital Signs Last 24 Hrs  T(C): 37.1 (28 Dec 2023 08:16), Max: 38.2 (28 Dec 2023 04:36)  T(F): 98.7 (28 Dec 2023 08:16), Max: 100.7 (28 Dec 2023 04:36)  HR: 120 (28 Dec 2023 08:00) (62 - 120)  BP: 155/79 (28 Dec 2023 08:00) (94/83 - 160/77)  BP(mean): 100 (28 Dec 2023 08:00) (71 - 112)  RR: 37 (28 Dec 2023 08:00) (15 - 37)  SpO2: 95% (28 Dec 2023 08:00) (93% - 98%)    Parameters below as of 28 Dec 2023 08:00  Patient On (Oxygen Delivery Method): room air    Detailed Neurologic Exam:    Mental status: The patient opens eyes readily to voice. He answers questions although is dysarthric. He follows instructions with right hand.     Cranial nerves: Pupils equal and react symmetrically to light. There is no visual field deficit to threat. Extraocular motion is full with no nystagmus. Facial sensation is intact. Facial musculature is asymmetric with a depression of the left nasolabial fold. Palate elevates symmetrically. Tongue is midline.    Motor: There is normal bulk and tone.  There is no tremor.  Strength grossly 5/5 in right UE. Left UE plegic.   Moves distal right LE slightly to stimuli. No movement of left LE.    Sensation: Decreased grimace to pin on the left.    Reflexes: 1+ throughout and plantar responses are flexor on the right and extensor on the left.    Cerebellar: Cannot be tested.     Labs:     12-28    147<H>  |  113<H>  |  26.5<H>  ----------------------------<  117<H>  2.8<LL>   |  21.0<L>  |  0.96    Ca    8.5      28 Dec 2023 05:22  Phos  2.6     12-27  Mg     2.1     12-27    TPro  5.1<L>  /  Alb  2.2<L>  /  TBili  0.5  /  DBili  x   /  AST  28  /  ALT  18  /  AlkPhos  92  12-28                            8.8    12.73 )-----------( 219      ( 28 Dec 2023 05:22 )             26.3

## 2023-12-29 LAB
-  AMOXICILLIN/CLAVULANIC ACID: SIGNIFICANT CHANGE UP
-  AMOXICILLIN/CLAVULANIC ACID: SIGNIFICANT CHANGE UP
-  AMPICILLIN/SULBACTAM: SIGNIFICANT CHANGE UP
-  AMPICILLIN/SULBACTAM: SIGNIFICANT CHANGE UP
-  AMPICILLIN: SIGNIFICANT CHANGE UP
-  AMPICILLIN: SIGNIFICANT CHANGE UP
-  AZTREONAM: SIGNIFICANT CHANGE UP
-  AZTREONAM: SIGNIFICANT CHANGE UP
-  CEFAZOLIN: SIGNIFICANT CHANGE UP
-  CEFAZOLIN: SIGNIFICANT CHANGE UP
-  CEFEPIME: SIGNIFICANT CHANGE UP
-  CEFEPIME: SIGNIFICANT CHANGE UP
-  CEFOXITIN: SIGNIFICANT CHANGE UP
-  CEFOXITIN: SIGNIFICANT CHANGE UP
-  CEFTRIAXONE: SIGNIFICANT CHANGE UP
-  CEFTRIAXONE: SIGNIFICANT CHANGE UP
-  CIPROFLOXACIN: SIGNIFICANT CHANGE UP
-  CIPROFLOXACIN: SIGNIFICANT CHANGE UP
-  ERTAPENEM: SIGNIFICANT CHANGE UP
-  ERTAPENEM: SIGNIFICANT CHANGE UP
-  GENTAMICIN: SIGNIFICANT CHANGE UP
-  GENTAMICIN: SIGNIFICANT CHANGE UP
-  IMIPENEM: SIGNIFICANT CHANGE UP
-  IMIPENEM: SIGNIFICANT CHANGE UP
-  LEVOFLOXACIN: SIGNIFICANT CHANGE UP
-  LEVOFLOXACIN: SIGNIFICANT CHANGE UP
-  MEROPENEM: SIGNIFICANT CHANGE UP
-  MEROPENEM: SIGNIFICANT CHANGE UP
-  PIPERACILLIN/TAZOBACTAM: SIGNIFICANT CHANGE UP
-  PIPERACILLIN/TAZOBACTAM: SIGNIFICANT CHANGE UP
-  TOBRAMYCIN: SIGNIFICANT CHANGE UP
-  TOBRAMYCIN: SIGNIFICANT CHANGE UP
-  TRIMETHOPRIM/SULFAMETHOXAZOLE: SIGNIFICANT CHANGE UP
-  TRIMETHOPRIM/SULFAMETHOXAZOLE: SIGNIFICANT CHANGE UP
ALBUMIN SERPL ELPH-MCNC: 2.1 G/DL — LOW (ref 3.3–5.2)
ALBUMIN SERPL ELPH-MCNC: 2.1 G/DL — LOW (ref 3.3–5.2)
ALP SERPL-CCNC: 81 U/L — SIGNIFICANT CHANGE UP (ref 40–120)
ALP SERPL-CCNC: 81 U/L — SIGNIFICANT CHANGE UP (ref 40–120)
ALT FLD-CCNC: 15 U/L — SIGNIFICANT CHANGE UP
ALT FLD-CCNC: 15 U/L — SIGNIFICANT CHANGE UP
ANION GAP SERPL CALC-SCNC: 12 MMOL/L — SIGNIFICANT CHANGE UP (ref 5–17)
ANION GAP SERPL CALC-SCNC: 12 MMOL/L — SIGNIFICANT CHANGE UP (ref 5–17)
AST SERPL-CCNC: 24 U/L — SIGNIFICANT CHANGE UP
AST SERPL-CCNC: 24 U/L — SIGNIFICANT CHANGE UP
BILIRUB SERPL-MCNC: 0.6 MG/DL — SIGNIFICANT CHANGE UP (ref 0.4–2)
BILIRUB SERPL-MCNC: 0.6 MG/DL — SIGNIFICANT CHANGE UP (ref 0.4–2)
BUN SERPL-MCNC: 21.1 MG/DL — HIGH (ref 8–20)
BUN SERPL-MCNC: 21.1 MG/DL — HIGH (ref 8–20)
C DIFF BY PCR RESULT: DETECTED
C DIFF BY PCR RESULT: DETECTED
CALCIUM SERPL-MCNC: 8 MG/DL — LOW (ref 8.4–10.5)
CALCIUM SERPL-MCNC: 8 MG/DL — LOW (ref 8.4–10.5)
CHLORIDE SERPL-SCNC: 107 MMOL/L — SIGNIFICANT CHANGE UP (ref 96–108)
CHLORIDE SERPL-SCNC: 107 MMOL/L — SIGNIFICANT CHANGE UP (ref 96–108)
CO2 SERPL-SCNC: 22 MMOL/L — SIGNIFICANT CHANGE UP (ref 22–29)
CO2 SERPL-SCNC: 22 MMOL/L — SIGNIFICANT CHANGE UP (ref 22–29)
CREAT SERPL-MCNC: 0.75 MG/DL — SIGNIFICANT CHANGE UP (ref 0.5–1.3)
CREAT SERPL-MCNC: 0.75 MG/DL — SIGNIFICANT CHANGE UP (ref 0.5–1.3)
CULTURE RESULTS: ABNORMAL
CULTURE RESULTS: ABNORMAL
EGFR: 90 ML/MIN/1.73M2 — SIGNIFICANT CHANGE UP
EGFR: 90 ML/MIN/1.73M2 — SIGNIFICANT CHANGE UP
GLUCOSE SERPL-MCNC: 133 MG/DL — HIGH (ref 70–99)
GLUCOSE SERPL-MCNC: 133 MG/DL — HIGH (ref 70–99)
HCT VFR BLD CALC: 28.1 % — LOW (ref 39–50)
HCT VFR BLD CALC: 28.1 % — LOW (ref 39–50)
HGB BLD-MCNC: 9.6 G/DL — LOW (ref 13–17)
HGB BLD-MCNC: 9.6 G/DL — LOW (ref 13–17)
MAGNESIUM SERPL-MCNC: 1.6 MG/DL — SIGNIFICANT CHANGE UP (ref 1.6–2.6)
MAGNESIUM SERPL-MCNC: 1.6 MG/DL — SIGNIFICANT CHANGE UP (ref 1.6–2.6)
MCHC RBC-ENTMCNC: 31.1 PG — SIGNIFICANT CHANGE UP (ref 27–34)
MCHC RBC-ENTMCNC: 31.1 PG — SIGNIFICANT CHANGE UP (ref 27–34)
MCHC RBC-ENTMCNC: 34.2 GM/DL — SIGNIFICANT CHANGE UP (ref 32–36)
MCHC RBC-ENTMCNC: 34.2 GM/DL — SIGNIFICANT CHANGE UP (ref 32–36)
MCV RBC AUTO: 90.9 FL — SIGNIFICANT CHANGE UP (ref 80–100)
MCV RBC AUTO: 90.9 FL — SIGNIFICANT CHANGE UP (ref 80–100)
METHOD TYPE: SIGNIFICANT CHANGE UP
METHOD TYPE: SIGNIFICANT CHANGE UP
ORGANISM # SPEC MICROSCOPIC CNT: ABNORMAL
ORGANISM # SPEC MICROSCOPIC CNT: ABNORMAL
ORGANISM # SPEC MICROSCOPIC CNT: SIGNIFICANT CHANGE UP
ORGANISM # SPEC MICROSCOPIC CNT: SIGNIFICANT CHANGE UP
PLATELET # BLD AUTO: 237 K/UL — SIGNIFICANT CHANGE UP (ref 150–400)
PLATELET # BLD AUTO: 237 K/UL — SIGNIFICANT CHANGE UP (ref 150–400)
POTASSIUM SERPL-MCNC: 2.8 MMOL/L — CRITICAL LOW (ref 3.5–5.3)
POTASSIUM SERPL-MCNC: 2.8 MMOL/L — CRITICAL LOW (ref 3.5–5.3)
POTASSIUM SERPL-MCNC: 4 MMOL/L — SIGNIFICANT CHANGE UP (ref 3.5–5.3)
POTASSIUM SERPL-MCNC: 4 MMOL/L — SIGNIFICANT CHANGE UP (ref 3.5–5.3)
POTASSIUM SERPL-SCNC: 2.8 MMOL/L — CRITICAL LOW (ref 3.5–5.3)
POTASSIUM SERPL-SCNC: 2.8 MMOL/L — CRITICAL LOW (ref 3.5–5.3)
POTASSIUM SERPL-SCNC: 4 MMOL/L — SIGNIFICANT CHANGE UP (ref 3.5–5.3)
POTASSIUM SERPL-SCNC: 4 MMOL/L — SIGNIFICANT CHANGE UP (ref 3.5–5.3)
PROT SERPL-MCNC: 5.2 G/DL — LOW (ref 6.6–8.7)
PROT SERPL-MCNC: 5.2 G/DL — LOW (ref 6.6–8.7)
RBC # BLD: 3.09 M/UL — LOW (ref 4.2–5.8)
RBC # BLD: 3.09 M/UL — LOW (ref 4.2–5.8)
RBC # FLD: 16.2 % — HIGH (ref 10.3–14.5)
RBC # FLD: 16.2 % — HIGH (ref 10.3–14.5)
SODIUM SERPL-SCNC: 141 MMOL/L — SIGNIFICANT CHANGE UP (ref 135–145)
SODIUM SERPL-SCNC: 141 MMOL/L — SIGNIFICANT CHANGE UP (ref 135–145)
VDRL CSF-TITR: SIGNIFICANT CHANGE UP
VDRL CSF-TITR: SIGNIFICANT CHANGE UP
WBC # BLD: 12.62 K/UL — HIGH (ref 3.8–10.5)
WBC # BLD: 12.62 K/UL — HIGH (ref 3.8–10.5)
WBC # FLD AUTO: 12.62 K/UL — HIGH (ref 3.8–10.5)
WBC # FLD AUTO: 12.62 K/UL — HIGH (ref 3.8–10.5)

## 2023-12-29 PROCEDURE — 99232 SBSQ HOSP IP/OBS MODERATE 35: CPT

## 2023-12-29 PROCEDURE — 99233 SBSQ HOSP IP/OBS HIGH 50: CPT

## 2023-12-29 RX ORDER — MAGNESIUM SULFATE 500 MG/ML
2 VIAL (ML) INJECTION ONCE
Refills: 0 | Status: COMPLETED | OUTPATIENT
Start: 2023-12-29 | End: 2023-12-29

## 2023-12-29 RX ORDER — POTASSIUM CHLORIDE 20 MEQ
10 PACKET (EA) ORAL
Refills: 0 | Status: DISCONTINUED | OUTPATIENT
Start: 2023-12-29 | End: 2023-12-29

## 2023-12-29 RX ORDER — VANCOMYCIN HCL 1 G
125 VIAL (EA) INTRAVENOUS EVERY 6 HOURS
Refills: 0 | Status: DISCONTINUED | OUTPATIENT
Start: 2023-12-29 | End: 2024-01-12

## 2023-12-29 RX ORDER — POTASSIUM CHLORIDE 20 MEQ
10 PACKET (EA) ORAL
Refills: 0 | Status: COMPLETED | OUTPATIENT
Start: 2023-12-29 | End: 2023-12-29

## 2023-12-29 RX ORDER — SODIUM CHLORIDE 9 MG/ML
500 INJECTION, SOLUTION INTRAVENOUS ONCE
Refills: 0 | Status: COMPLETED | OUTPATIENT
Start: 2023-12-29 | End: 2023-12-29

## 2023-12-29 RX ORDER — SODIUM CHLORIDE 9 MG/ML
1000 INJECTION, SOLUTION INTRAVENOUS
Refills: 0 | Status: DISCONTINUED | OUTPATIENT
Start: 2023-12-29 | End: 2023-12-29

## 2023-12-29 RX ADMIN — Medication 1 MILLIGRAM(S): at 12:19

## 2023-12-29 RX ADMIN — Medication 5 MILLIGRAM(S): at 19:11

## 2023-12-29 RX ADMIN — Medication 100 MILLIEQUIVALENT(S): at 10:15

## 2023-12-29 RX ADMIN — MEROPENEM 1000 MILLIGRAM(S): 1 INJECTION INTRAVENOUS at 13:32

## 2023-12-29 RX ADMIN — MEROPENEM 1000 MILLIGRAM(S): 1 INJECTION INTRAVENOUS at 05:31

## 2023-12-29 RX ADMIN — Medication 25 GRAM(S): at 13:31

## 2023-12-29 RX ADMIN — Medication 100 MILLIEQUIVALENT(S): at 09:32

## 2023-12-29 RX ADMIN — CASPOFUNGIN ACETATE 260 MILLIGRAM(S): 7 INJECTION, POWDER, LYOPHILIZED, FOR SOLUTION INTRAVENOUS at 12:18

## 2023-12-29 RX ADMIN — CHLORHEXIDINE GLUCONATE 1 APPLICATION(S): 213 SOLUTION TOPICAL at 05:33

## 2023-12-29 RX ADMIN — PANTOPRAZOLE SODIUM 40 MILLIGRAM(S): 20 TABLET, DELAYED RELEASE ORAL at 19:11

## 2023-12-29 RX ADMIN — Medication 100 MILLIEQUIVALENT(S): at 08:06

## 2023-12-29 RX ADMIN — Medication 1 TABLET(S): at 13:19

## 2023-12-29 RX ADMIN — Medication 125 MILLIGRAM(S): at 19:11

## 2023-12-29 RX ADMIN — PANTOPRAZOLE SODIUM 40 MILLIGRAM(S): 20 TABLET, DELAYED RELEASE ORAL at 05:31

## 2023-12-29 RX ADMIN — Medication 125 MILLIGRAM(S): at 13:13

## 2023-12-29 RX ADMIN — SODIUM CHLORIDE 75 MILLILITER(S): 9 INJECTION, SOLUTION INTRAVENOUS at 14:15

## 2023-12-29 NOTE — PHYSICAL THERAPY INITIAL EVALUATION ADULT - IMPAIRMENTS FOUND, PT EVAL
cognitive impairment/decreased midline orientation/ergonomics and body mechanics/fine motor/gait, locomotion, and balance/gross motor/muscle strength/posture/ROM/sensory integrity/tone
aerobic capacity/endurance/arousal, attention, and cognition/decreased midline orientation/gait, locomotion, and balance/muscle strength/ROM
arousal, attention, and cognition/gait, locomotion, and balance/muscle strength/posture
aerobic capacity/endurance/gait, locomotion, and balance/muscle strength/poor safety awareness/ROM
aerobic capacity/endurance/cognitive impairment/gait, locomotion, and balance/gross motor/muscle strength

## 2023-12-29 NOTE — PHYSICAL THERAPY INITIAL EVALUATION ADULT - PLANNED THERAPY INTERVENTIONS, PT EVAL
balance training/bed mobility training/strengthening/transfer training
balance training/bed mobility training/gait training/postural re-education/ROM/strengthening/transfer training
balance training/bed mobility training/gait training/ROM/strengthening/transfer training
balance training/bed mobility training/gait training/neuromuscular re-education/strengthening/transfer training
balance training/bed mobility training/gait training/ROM/strengthening/transfer training

## 2023-12-29 NOTE — PHYSICAL THERAPY INITIAL EVALUATION ADULT - MANUAL MUSCLE TESTING RESULTS, REHAB EVAL
LEft sided extremities 0/5, right sided extremities 1+/5
unable to accurately assess
right LE grossly 4/5, left LE grossly 1/5
right LE grossly 3/5, left LE 0/5
LUE and LLE: grossly 0/5 throughout.  RUE: at least 3/5 to shoulder flexors and elbow flexors.  RLE: at least 1+/5 to hip flexors, 0/5 to knee extensors, at least 3/5 to ankle dorsiflexors.

## 2023-12-29 NOTE — PHYSICAL THERAPY INITIAL EVALUATION ADULT - PERSONAL SAFETY AND JUDGMENT, REHAB EVAL
impaired
right UE wrist restraint/at risk behaviors demonstrated
unable to assess- lethargic
+ right UE wrist restraint/at risk behaviors demonstrated

## 2023-12-29 NOTE — PROGRESS NOTE ADULT - SUBJECTIVE AND OBJECTIVE BOX
[facility]                                        Neurology at [place]                                 Josh West, & Viral                                  [address]                                        [place], [place], [address]                                             [phone]        CC: intracranial hemorrhage     HPI:   The patient is an 83 year old man admitted 11/10/23 transferred from [facility] after presenting after a fall with head injury.   He was on anticoagulation for atrial fibrillation and CT showed intracranial hemorrhage.   he was transferred here for neurosurgery intervention.   He underwent right sided hemicraniectomy.  He was seen by Dr Moreno with the stroke team on 11/25/23 after brain MRI showed infarct.   In addition to the hemorrhages, the MRI showed tiny acute lacunar infarctions in the left cerebellar hemisphere and right parietal lobe, as well as around the surgical cavity.  The suspicion was that this was on an embolic basis secondary to atrial fibrillation and aortic valve echodensity.    Downgraded to medicine team 12/10, noted to be febrile with unremarkable work-up.  CT head obtained on 12/15 showed new increase in right frontal ICH. Upgraded again to neuro-ICU. Was given Andexxa for reversal. Repeat CT heads were noted as stable and patient was downgraded to medicine on 12/17.     Rapid Response was called on 12/21/23 for respiratory distress requiring intubation. He was transferred to the MICU service.   He was seen again by Dr Moreno 12/22/23 due to abnormal EEG findings of:  - Right anterior slowing and sharp-wave discharges  - Moderate generalized slowing.  - Right hemispheric breach artifact.  These were attributed to the intracranial hemorrhage and surgery and the recommendation was for continuing Keppra which at the time was at 1000 mg twice per day.   He was extubated 12/25/23 and transferred back to the medical service on 12/26/23.  Course has been complicated by aspiration pneumonia and fungemia.   ID service has been involved and patient remains on IV antibiotics and antifungals.   He has remained somnolent since transfer out of ICU.  The patient was seen by the palliative care service on 12/26/23.    Neurology called back today to reevaluate mental status as family attributing the lethargy to the Keppra.   After discussing with patient's daughter, I had switched him to Vimpat secondary to reported agitation on Keppra.   Family now reportedly refusing Vimpat.    Interim history:  Remains on 3 [place].     ROS:   Denies headache or dizziness.  Denies chest pain.  Denies shortness of breath.    MEDICATIONS  (STANDING):  atorvastatin 80 milliGRAM(s) Oral at bedtime  caspofungin IVPB      caspofungin IVPB 50 milliGRAM(s) IV Intermittent every 24 hours  chlorhexidine 2% Cloths 1 Application(s) Topical <User Schedule>  dextrose 5%. 1000 milliLiter(s) (83 mL/Hr) IV Continuous <Continuous>  doxazosin 2 milliGRAM(s) Oral at bedtime  folic acid 1 milliGRAM(s) Oral daily  lacosamide IVPB 150 milliGRAM(s) IV Intermittent every 12 hours  meropenem Injectable 1000 milliGRAM(s) IV Push every 8 hours  metoprolol tartrate Injectable 5 milliGRAM(s) IV Push every 6 hours  Nephro-roma 1 Tablet(s) Oral daily  pantoprazole  Injectable 40 milliGRAM(s) IV Push two times a day  potassium chloride  10 mEq/100 mL IVPB 10 milliEquivalent(s) IV Intermittent every 1 hour    Vital Signs Last 24 Hrs  T(C): 37.2 (29 Dec 2023 08:00), Max: 37.3 (29 Dec 2023 04:02)  T(F): 98.9 (29 Dec 2023 08:00), Max: 99.1 (29 Dec 2023 04:02)  HR: 103 (29 Dec 2023 08:00) (93 - 106)  BP: 89/65 (29 Dec 2023 08:00) (89/65 - 127/75)  BP(mean): 74 (29 Dec 2023 08:00) (72 - 104)  RR: 16 (29 Dec 2023 08:00) (12 - 31)  SpO2: 97% (29 Dec 2023 08:00) (94% - 100%)    Parameters below as of 29 Dec 2023 04:00  Patient On (Oxygen Delivery Method): room air    Detailed Neurologic Exam:    Mental status: The patient opens eyes readily to voice. He answers questions although is dysarthric. He follows instructions with right hand.     Cranial nerves: Pupils equal and react symmetrically to light. There is no visual field deficit to threat. Extraocular motion is full with no nystagmus. Facial sensation is intact. Facial musculature is asymmetric with a depression of the left nasolabial fold. Palate elevates symmetrically. Tongue is midline.    Motor: There is normal bulk and tone.  There is no tremor.  Strength grossly 5/5 in right UE. Left UE plegic.   Moves distal right LE slightly to stimuli. No movement of left LE.    Sensation: Decreased grimace to pin on the left.    Reflexes: 1+ throughout and plantar responses are flexor on the right and extensor on the left.    Cerebellar: Cannot be tested.     Labs:     12-29    141  |  107  |  21.1<H>  ----------------------------<  133<H>  2.8<LL>   |  22.0  |  0.75    Ca    8.0<L>      29 Dec 2023 04:55  Phos  2.6     12-27  Mg     1.6     12-29    TPro  5.2<L>  /  Alb  2.1<L>  /  TBili  0.6  /  DBili  x   /  AST  24  /  ALT  15  /  AlkPhos  81  12-29                            9.6    12.62 )-----------( 237      ( 29 Dec 2023 04:55 )             28.1       EEGs from earlier this admission:   Abnormal EEG   - Right anterior slowing and sharp-wave discharges  - Moderate generalized slowing.  - Right hemispheric breach artifact.                                   [facility]                                        Neurology at [place]                                 Josh West, & Viral                                  [address]                                        [place], [place], [address]                                             [phone]        CC: intracranial hemorrhage     HPI:   The patient is an 83 year old man admitted 11/10/23 transferred from [facility] after presenting after a fall with head injury.   He was on anticoagulation for atrial fibrillation and CT showed intracranial hemorrhage.   he was transferred here for neurosurgery intervention.   He underwent right sided hemicraniectomy.  He was seen by Dr Moreno with the stroke team on 11/25/23 after brain MRI showed infarct.   In addition to the hemorrhages, the MRI showed tiny acute lacunar infarctions in the left cerebellar hemisphere and right parietal lobe, as well as around the surgical cavity.  The suspicion was that this was on an embolic basis secondary to atrial fibrillation and aortic valve echodensity.    Downgraded to medicine team 12/10, noted to be febrile with unremarkable work-up.  CT head obtained on 12/15 showed new increase in right frontal ICH. Upgraded again to neuro-ICU. Was given Andexxa for reversal. Repeat CT heads were noted as stable and patient was downgraded to medicine on 12/17.     Rapid Response was called on 12/21/23 for respiratory distress requiring intubation. He was transferred to the MICU service.   He was seen again by Dr Moreno 12/22/23 due to abnormal EEG findings of:  - Right anterior slowing and sharp-wave discharges  - Moderate generalized slowing.  - Right hemispheric breach artifact.  These were attributed to the intracranial hemorrhage and surgery and the recommendation was for continuing Keppra which at the time was at 1000 mg twice per day.   He was extubated 12/25/23 and transferred back to the medical service on 12/26/23.  Course has been complicated by aspiration pneumonia and fungemia.   ID service has been involved and patient remains on IV antibiotics and antifungals.   He has remained somnolent since transfer out of ICU.  The patient was seen by the palliative care service on 12/26/23.    Neurology called back today to reevaluate mental status as family attributing the lethargy to the Keppra.   After discussing with patient's daughter, I had switched him to Vimpat secondary to reported agitation on Keppra.   Family now reportedly refusing Vimpat.    Interim history:  Remains on 3 [place].     ROS:   Denies headache or dizziness.  Denies chest pain.  Denies shortness of breath.    MEDICATIONS  (STANDING):  atorvastatin 80 milliGRAM(s) Oral at bedtime  caspofungin IVPB      caspofungin IVPB 50 milliGRAM(s) IV Intermittent every 24 hours  chlorhexidine 2% Cloths 1 Application(s) Topical <User Schedule>  dextrose 5%. 1000 milliLiter(s) (83 mL/Hr) IV Continuous <Continuous>  doxazosin 2 milliGRAM(s) Oral at bedtime  folic acid 1 milliGRAM(s) Oral daily  lacosamide IVPB 150 milliGRAM(s) IV Intermittent every 12 hours  meropenem Injectable 1000 milliGRAM(s) IV Push every 8 hours  metoprolol tartrate Injectable 5 milliGRAM(s) IV Push every 6 hours  Nephro-roma 1 Tablet(s) Oral daily  pantoprazole  Injectable 40 milliGRAM(s) IV Push two times a day  potassium chloride  10 mEq/100 mL IVPB 10 milliEquivalent(s) IV Intermittent every 1 hour    Vital Signs Last 24 Hrs  T(C): 37.2 (29 Dec 2023 08:00), Max: 37.3 (29 Dec 2023 04:02)  T(F): 98.9 (29 Dec 2023 08:00), Max: 99.1 (29 Dec 2023 04:02)  HR: 103 (29 Dec 2023 08:00) (93 - 106)  BP: 89/65 (29 Dec 2023 08:00) (89/65 - 127/75)  BP(mean): 74 (29 Dec 2023 08:00) (72 - 104)  RR: 16 (29 Dec 2023 08:00) (12 - 31)  SpO2: 97% (29 Dec 2023 08:00) (94% - 100%)    Parameters below as of 29 Dec 2023 04:00  Patient On (Oxygen Delivery Method): room air    Detailed Neurologic Exam:    Mental status: The patient opens eyes readily to voice. He answers questions although is dysarthric. He follows instructions with right hand.     Cranial nerves: Pupils equal and react symmetrically to light. There is no visual field deficit to threat. Extraocular motion is full with no nystagmus. Facial sensation is intact. Facial musculature is asymmetric with a depression of the left nasolabial fold. Palate elevates symmetrically. Tongue is midline.    Motor: There is normal bulk and tone.  There is no tremor.  Strength grossly 5/5 in right UE. Left UE plegic.   Moves distal right LE slightly to stimuli. No movement of left LE.    Sensation: Decreased grimace to pin on the left.    Reflexes: 1+ throughout and plantar responses are flexor on the right and extensor on the left.    Cerebellar: Cannot be tested.     Labs:     12-29    141  |  107  |  21.1<H>  ----------------------------<  133<H>  2.8<LL>   |  22.0  |  0.75    Ca    8.0<L>      29 Dec 2023 04:55  Phos  2.6     12-27  Mg     1.6     12-29    TPro  5.2<L>  /  Alb  2.1<L>  /  TBili  0.6  /  DBili  x   /  AST  24  /  ALT  15  /  AlkPhos  81  12-29                            9.6    12.62 )-----------( 237      ( 29 Dec 2023 04:55 )             28.1       EEGs from earlier this admission:   Abnormal EEG   - Right anterior slowing and sharp-wave discharges  - Moderate generalized slowing.  - Right hemispheric breach artifact.

## 2023-12-29 NOTE — PROGRESS NOTE ADULT - SUBJECTIVE AND OBJECTIVE BOX
[facility] Division of Hospital Medicine      Hospital course: 82y M with PMH HTN, CAD s/p stents, renal cell carcinoma status post left nephrectomy, bladder CA, BPH, CHF, LBBB, vertigo,  HLD, 5.5cm AAA without rupture post EVAR, paroxysmal A-fib on Eliquis, Plavix, and aspirin, early stage Alzheimer dementia transferred from [facility] s/p unwitnessed fall with head strike at home found by wife on floor at 8am. Patient brought to urgent care by wife and had 5 staples to posterior scalp but was instructed to go to nearest ED.  CT head at [facility] showed R frontal IPH, SDH, SAH at 9:00. CTA stroke protocol not performed at [facility]. Patient BIB on 6L NC.  Pt GCS 15 on arrival, A&Ox2 on arrival. After initial assessment, patient noted to be vomiting enroute to CT scanner.   admitted 11/10 with prolonged hospitalization here.  being found to have right frontal IPH, 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  Extubated 11/13. Course complicated by Acute Kidney Injury, afib, EEG with epileptiform discharges. Patient was trasnferred to step down unit 11/23. TTE had shown possible vegetation on valve but repeat was not diagnostic and blood cultures were negative. Course further complicated by deep vein thrombosis or LUE brachial veins. Patient course further complicated by stroke found on MRI, WHIT recommended but family declined. He subsequently underwent cranioplasty on 12/7, drain removed 12/10. He had worsening of brain bleed 12/15, taken off anticoagulation and reversed. Patient then had acute decompensation on 12/21 for worsening mental status, vomiting, aspiration, and shock, requiring transfer to Intensive care unit for intubation, and pressors. Further complications including acute blood loss anemia requiring PRBCs, klebsiella pneumonia afib with RVR. He is now s/p extubation 12/25.        Patient is new to me , Chart reviewed   Patient seen and examined lying inbed, no respiratory distress, nurse present at the bedside   Per nurse the patient tested positive      SUBJECTIVE  Patient answered some questions. He  denies chest pain, SOB, abd pain, N/V, fever, chills,. he has no other complaints.   MEDICATIONS  (STANDING):  atorvastatin 80 milliGRAM(s) Oral at bedtime  caspofungin IVPB      caspofungin IVPB 50 milliGRAM(s) IV Intermittent every 24 hours  chlorhexidine 2% Cloths 1 Application(s) Topical <User Schedule>  dextrose 5%. 1000 milliLiter(s) (83 mL/Hr) IV Continuous <Continuous>  doxazosin 2 milliGRAM(s) Oral at bedtime  folic acid 1 milliGRAM(s) Oral daily  lacosamide IVPB 150 milliGRAM(s) IV Intermittent every 12 hours  lactated ringers Bolus 500 milliLiter(s) IV Bolus once  meropenem Injectable 1000 milliGRAM(s) IV Push every 8 hours  metoprolol tartrate Injectable 5 milliGRAM(s) IV Push every 6 hours  Nephro-roma 1 Tablet(s) Oral daily  pantoprazole  Injectable 40 milliGRAM(s) IV Push two times a day  vancomycin    Solution 125 milliGRAM(s) Oral every 6 hours    MEDICATIONS  (PRN):  acetaminophen     Tablet .. 650 milliGRAM(s) Oral every 6 hours PRN Temp greater or equal to 38C (100.4F), Mild Pain (1 - 3)  artificial tears (preservative free) Ophthalmic Solution 1 Drop(s) Both EYES every 6 hours PRN Dry Eyes        I&O's Summary    28 Dec 2023 07:01  -  29 Dec 2023 07:00  --------------------------------------------------------  IN: 2887 mL / OUT: 2950 mL / NET: -63 mL    29 Dec 2023 07:01  -  29 Dec 2023 23:12  --------------------------------------------------------  IN: 1033 mL / OUT: 2425 mL / NET: -1392 mL        PHYSICAL EXAM:  Vital Signs Last 24 Hrs  T(C): 36.6 (29 Dec 2023 21:00), Max: 37.3 (29 Dec 2023 04:02)  T(F): 97.8 (29 Dec 2023 21:00), Max: 99.1 (29 Dec 2023 04:02)  HR: 101 (29 Dec 2023 20:00) (85 - 107)  BP: 103/79 (29 Dec 2023 20:00) (86/57 - 110/73)  BP(mean): 88 (29 Dec 2023 20:00) (68 - 88)  RR: 18 (29 Dec 2023 20:00) (13 - 18)  SpO2: 96% (29 Dec 2023 20:00) (94% - 100%)    Parameters below as of 29 Dec 2023 20:00  Patient On (Oxygen Delivery Method): room air            CONSTITUTIONAL: no respiratory distress.  HENMT: Scar on the right side of his head. not pale, anicteric,Moist oral mucosa,  RESPIRATORY: vesicular breath sounds,  no wheezing, no crepitations,  CARDIOVASCULAR: Regular rate and rhythm, normal S1 and S2, no murmur/rub/gallop;   ABDOMEN: Nontender to palpation, normoactive bowel sounds, no rebound/guarding; No hepatosplenomegaly rectal tube with water diarrhea  MUSCLOSKELETAL: no clubbing or cyanosis of digits left arm in a splint and left leg in a splints  PSYCH:  alert , no t oriented ot person , place, no time  NEUROLOGY: alert. Patient does not follow commands  SKIN: No rashes; no palpable lesions    LABS:                        9.6    12.62 )-----------( 237      ( 29 Dec 2023 04:55 )             28.1     12-29    x   |  x   |  x   ----------------------------<  x   4.0   |  x   |  x     Ca    8.0<L>      29 Dec 2023 04:55  Mg     1.6     12-29    TPro  5.2<L>  /  Alb  2.1<L>  /  TBili  0.6  /  DBili  x   /  AST  24  /  ALT  15  /  AlkPhos  81  12-29          Urinalysis Basic - ( 29 Dec 2023 04:55 )    Color: x / Appearance: x / SG: x / pH: x  Gluc: 133 mg/dL / Ketone: x  / Bili: x / Urobili: x   Blood: x / Protein: x / Nitrite: x   Leuk Esterase: x / RBC: x / WBC x   Sq Epi: x / Non Sq Epi: x / Bacteria: x        Culture - Blood (collected 28 Dec 2023 05:22)  Source: .Blood Blood-Peripheral  Preliminary Report (29 Dec 2023 14:04):    No growth at 24 hours    Culture - Blood (collected 28 Dec 2023 05:22)  Source: .Blood Blood-Peripheral  Preliminary Report (29 Dec 2023 14:04):    No growth at 24 hours      CAPILLARY BLOOD GLUCOSE            RADIOLOGY & ADDITIONAL TESTS:  Results Reviewed:   Imaging Personally Reviewed:  Electrocardiogram Personally Reviewed:                                           [facility] Division of Hospital Medicine      Hospital course: 82y M with PMH HTN, CAD s/p stents, renal cell carcinoma status post left nephrectomy, bladder CA, BPH, CHF, LBBB, vertigo,  HLD, 5.5cm AAA without rupture post EVAR, paroxysmal A-fib on Eliquis, Plavix, and aspirin, early stage Alzheimer dementia transferred from [facility] s/p unwitnessed fall with head strike at home found by wife on floor at 8am. Patient brought to urgent care by wife and had 5 staples to posterior scalp but was instructed to go to nearest ED.  CT head at [facility] showed R frontal IPH, SDH, SAH at 9:00. CTA stroke protocol not performed at [facility]. Patient BIB on 6L NC.  Pt GCS 15 on arrival, A&Ox2 on arrival. After initial assessment, patient noted to be vomiting enroute to CT scanner.   admitted 11/10 with prolonged hospitalization here.  being found to have right frontal IPH, 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  Extubated 11/13. Course complicated by Acute Kidney Injury, afib, EEG with epileptiform discharges. Patient was trasnferred to step down unit 11/23. TTE had shown possible vegetation on valve but repeat was not diagnostic and blood cultures were negative. Course further complicated by deep vein thrombosis or LUE brachial veins. Patient course further complicated by stroke found on MRI, WHIT recommended but family declined. He subsequently underwent cranioplasty on 12/7, drain removed 12/10. He had worsening of brain bleed 12/15, taken off anticoagulation and reversed. Patient then had acute decompensation on 12/21 for worsening mental status, vomiting, aspiration, and shock, requiring transfer to Intensive care unit for intubation, and pressors. Further complications including acute blood loss anemia requiring PRBCs, klebsiella pneumonia afib with RVR. He is now s/p extubation 12/25.        Patient is new to me , Chart reviewed   Patient seen and examined lying inbed, no respiratory distress, nurse present at the bedside   Per nurse the patient tested positive      SUBJECTIVE  Patient answered some questions. He  denies chest pain, SOB, abd pain, N/V, fever, chills,. he has no other complaints.   MEDICATIONS  (STANDING):  atorvastatin 80 milliGRAM(s) Oral at bedtime  caspofungin IVPB      caspofungin IVPB 50 milliGRAM(s) IV Intermittent every 24 hours  chlorhexidine 2% Cloths 1 Application(s) Topical <User Schedule>  dextrose 5%. 1000 milliLiter(s) (83 mL/Hr) IV Continuous <Continuous>  doxazosin 2 milliGRAM(s) Oral at bedtime  folic acid 1 milliGRAM(s) Oral daily  lacosamide IVPB 150 milliGRAM(s) IV Intermittent every 12 hours  lactated ringers Bolus 500 milliLiter(s) IV Bolus once  meropenem Injectable 1000 milliGRAM(s) IV Push every 8 hours  metoprolol tartrate Injectable 5 milliGRAM(s) IV Push every 6 hours  Nephro-roma 1 Tablet(s) Oral daily  pantoprazole  Injectable 40 milliGRAM(s) IV Push two times a day  vancomycin    Solution 125 milliGRAM(s) Oral every 6 hours    MEDICATIONS  (PRN):  acetaminophen     Tablet .. 650 milliGRAM(s) Oral every 6 hours PRN Temp greater or equal to 38C (100.4F), Mild Pain (1 - 3)  artificial tears (preservative free) Ophthalmic Solution 1 Drop(s) Both EYES every 6 hours PRN Dry Eyes        I&O's Summary    28 Dec 2023 07:01  -  29 Dec 2023 07:00  --------------------------------------------------------  IN: 2887 mL / OUT: 2950 mL / NET: -63 mL    29 Dec 2023 07:01  -  29 Dec 2023 23:12  --------------------------------------------------------  IN: 1033 mL / OUT: 2425 mL / NET: -1392 mL        PHYSICAL EXAM:  Vital Signs Last 24 Hrs  T(C): 36.6 (29 Dec 2023 21:00), Max: 37.3 (29 Dec 2023 04:02)  T(F): 97.8 (29 Dec 2023 21:00), Max: 99.1 (29 Dec 2023 04:02)  HR: 101 (29 Dec 2023 20:00) (85 - 107)  BP: 103/79 (29 Dec 2023 20:00) (86/57 - 110/73)  BP(mean): 88 (29 Dec 2023 20:00) (68 - 88)  RR: 18 (29 Dec 2023 20:00) (13 - 18)  SpO2: 96% (29 Dec 2023 20:00) (94% - 100%)    Parameters below as of 29 Dec 2023 20:00  Patient On (Oxygen Delivery Method): room air            CONSTITUTIONAL: no respiratory distress.  HENMT: Scar on the right side of his head. not pale, anicteric,Moist oral mucosa,  RESPIRATORY: vesicular breath sounds,  no wheezing, no crepitations,  CARDIOVASCULAR: Regular rate and rhythm, normal S1 and S2, no murmur/rub/gallop;   ABDOMEN: Nontender to palpation, normoactive bowel sounds, no rebound/guarding; No hepatosplenomegaly rectal tube with water diarrhea  MUSCLOSKELETAL: no clubbing or cyanosis of digits left arm in a splint and left leg in a splints  PSYCH:  alert , no t oriented ot person , place, no time  NEUROLOGY: alert. Patient does not follow commands  SKIN: No rashes; no palpable lesions    LABS:                        9.6    12.62 )-----------( 237      ( 29 Dec 2023 04:55 )             28.1     12-29    x   |  x   |  x   ----------------------------<  x   4.0   |  x   |  x     Ca    8.0<L>      29 Dec 2023 04:55  Mg     1.6     12-29    TPro  5.2<L>  /  Alb  2.1<L>  /  TBili  0.6  /  DBili  x   /  AST  24  /  ALT  15  /  AlkPhos  81  12-29          Urinalysis Basic - ( 29 Dec 2023 04:55 )    Color: x / Appearance: x / SG: x / pH: x  Gluc: 133 mg/dL / Ketone: x  / Bili: x / Urobili: x   Blood: x / Protein: x / Nitrite: x   Leuk Esterase: x / RBC: x / WBC x   Sq Epi: x / Non Sq Epi: x / Bacteria: x        Culture - Blood (collected 28 Dec 2023 05:22)  Source: .Blood Blood-Peripheral  Preliminary Report (29 Dec 2023 14:04):    No growth at 24 hours    Culture - Blood (collected 28 Dec 2023 05:22)  Source: .Blood Blood-Peripheral  Preliminary Report (29 Dec 2023 14:04):    No growth at 24 hours      CAPILLARY BLOOD GLUCOSE            RADIOLOGY & ADDITIONAL TESTS:  Results Reviewed:   Imaging Personally Reviewed:  Electrocardiogram Personally Reviewed:

## 2023-12-29 NOTE — PHYSICAL THERAPY INITIAL EVALUATION ADULT - RANGE OF MOTION EXAMINATION, REHAB EVAL
left UE/LE no active movement noted, WNL passively/Right UE ROM was WNL (within normal limits)/Right LE ROM was WNL (within normal limits)
LUE PROM: shoulder flexion limited to approximately 80 degrees, elbow flexion and extension WFL. LLE PROM: hip flexion WFL, knee flexion and extension WFL, ankle dorsiflexion WFL.  RUE: shoulder flexion to approximately 100 degrees, elbow flexion and extension WFL. RLE PROM: hip flexion WFL, knee flexion and extension WFL, ankle dorsiflexion WFL.
Right UE ROM was WNL (within normal limits)/Right LE ROM was WNL (within normal limits)

## 2023-12-29 NOTE — PHYSICAL THERAPY INITIAL EVALUATION ADULT - DIAGNOSIS, PT EVAL
Pt with dec functional mobility 2*2 dec strength/ inc lethargy
functional debility 2*2 weakness (Left > Right)
Pt demonstrates functional limitations in bed mobility and transfers due to severe bilateral weakness, right sided increased tone, and impaired sensation.
Impaired functional mobility due to left sided weakness
Impaired functional mobility secondary to left sided weakness

## 2023-12-29 NOTE — PROVIDER CONTACT NOTE (CRITICAL VALUE NOTIFICATION) - TEST AND RESULT REPORTED:
K 2.8
Potassium 2.8
Potassium 2.8
Hgb 6.8  Hct 20.1
High sensitivity Troponin, 94
Lactate 2.2
potassium 2.9
potassium 2.9

## 2023-12-29 NOTE — PROGRESS NOTE ADULT - ASSESSMENT
82y M with PMH HTN, CAD s/p stents, renal cell carcinoma status post left nephrectomy, bladder CA, BPH, CHF, LBBB, vertigo,  HLD, 5.5cm AAA without rupture post EVAR, paroxysmal A-fib on Eliquis, Plavix, and aspirin, early stage Alzheimer dementia transferred from [facility] s/p unwitnessed fall with head strike at home found by wife on floor at 8am. Patient brought to urgent care by wife and had 5 staples to posterior scalp but was instructed to go to nearest ED.  CT head at [place] showed R frontal IPH, SDH, SAH s/p decompressive craniectomy 11/10. Initial TTE with ?  AV vegetation vs calcification vs artifact, repeat TTE 11/21/23 nondiagnostic, family refused WHIT.   Treated for klebseila aerogenes tracheobronchitis,  LUE DVT, CVA, s/p cranioplasty on 12/7,   Hospital course c/b intracranial bleed, AMS, vomiting and suspected aspiration and septic shock. Patient  intubated 12/21 and ETT cx + enterobacter (I) to imi (repest testing requested), BCX + c.glabrata. cta/p showed stable endoleak-no intervention per vascular  ID called for fungemia ? line infection . Patient with low grade temp and ongoing liquid stool in rectal tube found to be C diff +      - c/w caspofungin  - f/u repeat BCX ngtd  - f/u BCX candida glabrata  f/u (s) caspofungin  - complete meropenem x 7 days  - rt fem line-removed  - BP low- c/w ivf  - po vancomycin 125 mg q6h  - monitor for diarrhea  - contact isolation  - repeat TTE 12/21 technically difficult study, no vegetations reported  - ophthal eval when stable  - guarded prognosis  - palliative following    will f/u  d/w RN, PA               82y M with PMH HTN, CAD s/p stents, renal cell carcinoma status post left nephrectomy, bladder CA, BPH, CHF, LBBB, vertigo,  HLD, 5.5cm AAA without rupture post EVAR, paroxysmal A-fib on Eliquis, Plavix, and aspirin, early stage Alzheimer dementia transferred from [facility] s/p unwitnessed fall with head strike at home found by wife on floor at 8am. Patient brought to urgent care by wife and had 5 staples to posterior scalp but was instructed to go to nearest ED.  CT head at [place] showed R frontal IPH, SDH, SAH s/p decompressive craniectomy 11/10. Initial TTE with ?  AV vegetation vs calcification vs artifact, repeat TTE 11/21/23 nondiagnostic, family refused WHIT.   Treated for klebseila aerogenes tracheobronchitis,  LUE DVT, CVA, s/p cranioplasty on 12/7,   Hospital course c/b intracranial bleed, AMS, vomiting and suspected aspiration and septic shock. Patient  intubated 12/21 and ETT cx + enterobacter (I) to imi (repest testing requested), BCX + c.glabrata. cta/p showed stable endoleak-no intervention per vascular  ID called for fungemia ? line infection . Patient with low grade temp and ongoing liquid stool in rectal tube found to be C diff +      - c/w caspofungin  - f/u repeat BCX ngtd  - f/u BCX candida glabrata  f/u (s) caspofungin  - complete meropenem x 7 days  - rt fem line-removed  - BP low- c/w ivf  - po vancomycin 125 mg q6h  - monitor for diarrhea  - contact isolation  - repeat TTE 12/21 technically difficult study, no vegetations reported  - ophthal eval when stable  - guarded prognosis  - palliative following    will f/u  d/w RN, PA

## 2023-12-29 NOTE — CHART NOTE - NSCHARTNOTEFT_GEN_A_CORE
NS Brief Follow-Up    Patient seen by Dr. Larios this morning.   No active or acute NS issues at this time.   Flap intact and sinking.   Aware that family is refusing AEDs despite being at risk for seizures per Neurology.   CW supportive care and primary medical management per medicine.   Will continue to follow.

## 2023-12-29 NOTE — PHYSICAL THERAPY INITIAL EVALUATION ADULT - GENERAL OBSERVATIONS, REHAB EVAL
Pt received in bed, + IV, +Tele//BP, + IZABEL drain + VCBs + hall, in NAD, in 0/10 pain, agreeable to PT evaluation
Pt received supine in bed + telemetry//BP + IV + Venous Compression Boots + hall + right wrist restraint, + Helmet donned for mobility. Pt c/o 0/10 pain, pt agreeable to PT
Pt received in bed, + IV, +Tele//BP monitoring, + VCBs + NG Tube + NC O2 + condom cath + right UE wrist restraint + helmet at bedside, Wife Aminta present, agreeable to PT eval
Pt received reclining in bed, bedrails x 4, central line intact, telemonitor, , BP cuff, hall, dignicare, SCDs, and LLE PRAFO all intact. Pts wife at bedside. pt agreeable to PT evaluation.
Pt received in semifowler position with monitor, pleasant and agreeable to PT, lethargic.

## 2023-12-29 NOTE — PROGRESS NOTE ADULT - ASSESSMENT
82y M with PMH HTN, CAD s/p stents, renal cell carcinoma status post left nephrectomy, bladder CA, BPH, CHF, LBBB, vertigo,  HLD, 5.5cm AAA without rupture post EVAR, paroxysmal A-fib on Eliquis, Plavix, and aspirin, early stage Alzheimer dementia transferred from [facility] s/p unwitnessed fall with head strike at home found by wife on floor at 8am. Patient brought to urgent care by wife and had 5 staples to posterior scalp but was instructed to go to nearest ED.  CT head at [place] showed R frontal IPH, SDH, SAH at 9:00. CTA stroke protocol not performed at [facility]. Patient BIB on 6L NC.  Pt GCS 15 on arrival, A&Ox2 on arrival. After initial assessment, patient noted to be vomiting enroute to CT scanner.   admitted 11/10 with prolonged hospitalization here.  being found to have right frontal IPH, 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  Extubated 11/13. Course complicated by Acute Kidney Injury, afib, EEG with epileptiform discharges. Patient was trasnferred to step down unit 11/23. TTE had shown possible vegetation on valve but repeat was not diagnostic and blood cultures were negative. Course further complicated by deep vein thrombosis or LUE brachial veins. Patient course further complicated by stroke found on MRI, WHIT recommended but family declined. He subsequently underwent cranioplasty on 12/7, drain removed 12/10. He had worsening of brain bleed 12/15, taken off anticoagulation and reversed. Patient then had acute decompensation on 12/21 for worsening mental status, vomiting, aspiration, and shock, requiring transfer to Intensive care unit for intubation, and pressors. Further complications including acute blood loss anemia requiring PRBCs, klebsiella pneumonia afib with RVR. He is now s/p extubation 12/25.        # Encephalopathy, multifactorial. ICH, CVA, ICU, possible delirium  Waxing, waning  - frequent reorientation  - Neurology follow up    # acute respiratory failure  Currently normoxic on room air    # aspiration pneumonia  Improving Leukocytosis, though febrile  - Aspiration precautions  - Monitor for hypoxia  - continue Meropenem  - ID follow up      # C diff   - Patient stared on vancomycin 125mg Q 6    # Dysphagia   puree with no liquids  - SLP evaluation      # fungemia: 9 candida galbrata)  - c/w caspofungin  - Id consult appreciated.    # Atrial Fibrillation  - Metoprolol IV  - No anticoagulation given ICH    # ICH s/p craniotomy and cranioplasty  abnormal EEG with risk of seizures  -  Levetiracetam changed to Lacosamide by Neurology after discussion with Neurology; family refusing AED  At risk for seizure and decompensation    # anemia: likely multifactorial    s/p PRBC    c/w PPI,     no intervention per GI.    # hypokalemia:    Patient's potassium was low 2.8  and he was ordered iv potassium 10 MEQ X 3 doses      Full Code    Palliative involved,    82y M with PMH HTN, CAD s/p stents, renal cell carcinoma status post left nephrectomy, bladder CA, BPH, CHF, LBBB, vertigo,  HLD, 5.5cm AAA without rupture post EVAR, paroxysmal A-fib on Eliquis, Plavix, and aspirin, early stage Alzheimer dementia transferred from [facility] s/p unwitnessed fall with head strike at home found by wife on floor at 8am. Patient brought to urgent care by wife and had 5 staples to posterior scalp but was instructed to go to nearest ED.  CT head at [place] showed R frontal IPH, SDH, SAH at 9:00. CTA stroke protocol not performed at [facility]. Patient BIB on 6L NC.  Pt GCS 15 on arrival, A&Ox2 on arrival. After initial assessment, patient noted to be vomiting enroute to CT scanner.   admitted 11/10 with prolonged hospitalization here.  being found to have right frontal IPH, 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  Extubated 11/13. Course complicated by Acute Kidney Injury, afib, EEG with epileptiform discharges. Patient was trasnferred to step down unit 11/23. TTE had shown possible vegetation on valve but repeat was not diagnostic and blood cultures were negative. Course further complicated by deep vein thrombosis or LUE brachial veins. Patient course further complicated by stroke found on MRI, WHIT recommended but family declined. He subsequently underwent cranioplasty on 12/7, drain removed 12/10. He had worsening of brain bleed 12/15, taken off anticoagulation and reversed. Patient then had acute decompensation on 12/21 for worsening mental status, vomiting, aspiration, and shock, requiring transfer to Intensive care unit for intubation, and pressors. Further complications including acute blood loss anemia requiring PRBCs, klebsiella pneumonia afib with RVR. He is now s/p extubation 12/25.        # Encephalopathy, multifactorial. ICH, CVA, ICU, possible delirium  Waxing, waning  - frequent reorientation  - Neurology follow up    # acute respiratory failure  Currently normoxic on room air    # aspiration pneumonia  Improving Leukocytosis, though febrile  - Aspiration precautions  - Monitor for hypoxia  - continue Meropenem  - ID follow up      # C diff   - Patient stared on vancomycin 125mg Q 6    # Dysphagia   puree with no liquids  - SLP evaluation      # fungemia: 9 candida galbrata)  - c/w caspofungin  - Id consult appreciated.    # Atrial Fibrillation  - Metoprolol IV  - No anticoagulation given ICH    # ICH s/p craniotomy and cranioplasty  abnormal EEG with risk of seizures  -  Levetiracetam changed to Lacosamide by Neurology after discussion with Neurology; family refusing AED  At risk for seizure and decompensation    # anemia: likely multifactorial    s/p PRBC    c/w PPI,     no intervention per GI.    # hypokalemia:    Patient's potassium was low 2.8  and he was ordered iv potassium 10 MEQ X 3 doses      Full Code    Palliative involved,

## 2023-12-29 NOTE — PROVIDER CONTACT NOTE (CRITICAL VALUE NOTIFICATION) - PERSON GIVING RESULT:
mar mantilla
Lab - David Cedillo
RN José Miguel Pablo[address]
Gracie Riddle
Fabby Mendosa
Lab Barbie
Lab- Sincere Cedillo
Farideh Otto

## 2023-12-29 NOTE — PROGRESS NOTE ADULT - SUBJECTIVE AND OBJECTIVE BOX
[facility]                                                INFECTIOUS DISEASES  =======================================================                   Uriel Vivas#   Roman Gonzáles MD#    Shan Wilson MD*                           Jennie Huerta MD*   Stephany Segura MD*           Diplomates American Board of Internal Medicine & Infectious Diseases                  # [place] Office - Appt - Tel  [phone] Fax [phone]                * [place] Office - Appt - Tel [phone] Fax [phone]                                  Hospital Consult line:  [phone]  =======================================================      [id number]  DEUCE BALL   follow up for: Ifungemia      Patient admitted with right frontal IPH, 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 , LUE DVT, CVA, s/p cranioplasty on 12/7, c/b intracranial bleed, AMS, suspected aspiration and septic shock. Patietn was intubated and ETT cx + enterobacter, BCX + c.glabrata. cta/p showed stable endoleak-no intervention per vascular    pt extubated 12/25, transferred out of icu  12/27 RRT for SVT  12/28 low grade fever, pt  c/o abdominal discomfort  persistent watery stool in rectal tube  c diff +  bp low  pt axo1- lethargic but able to recognise wife at bedside,  denies complaints today  patient seen and examined.       I have personally reviewed the labs and data; pertinent labs and data are listed in this note; please see below.   ===================================================  REVIEW OF SYSTEMS:  limited    =======================================================  Allergies    penicillin (Rash)  heparin (Other (Severe))  penicillin (Hives)  Cipro (Other)  Levaquin (Other)  heparin (Other)    Intolerances    Antibiotics:  caspofungin IVPB      meropenem Injectable 1000 milliGRAM(s) IV Push every 12 hours    Other medications:  atorvastatin 80 milliGRAM(s) Oral at bedtime  chlorhexidine 2% Cloths 1 Application(s) Topical <User Schedule>  doxazosin 2 milliGRAM(s) Oral at bedtime  folic acid 1 milliGRAM(s) Oral daily  levETIRAcetam   Injectable 1000 milliGRAM(s) IV Push every 12 hours  metoprolol tartrate Injectable 5 milliGRAM(s) IV Push every 6 hours  Nephro-roma 1 Tablet(s) Oral daily  pantoprazole  Injectable 40 milliGRAM(s) IV Push two times a day    ======================================================  Physical Exam:  ============  Vital Signs Last 24 Hrs  Vital Signs Last 24 Hrs  T(C): 36.5 (29 Dec 2023 12:00), Max: 37.3 (29 Dec 2023 04:02)  T(F): 97.7 (29 Dec 2023 12:00), Max: 99.1 (29 Dec 2023 04:02)  HR: 99 (29 Dec 2023 12:00) (96 - 106)  BP: 86/57 (29 Dec 2023 12:00) (86/57 - 127/75)  BP(mean): 68 (29 Dec 2023 12:00) (68 - 97)  RR: 18 (29 Dec 2023 12:00) (12 - 31)  SpO2: 94% (29 Dec 2023 12:00) (94% - 100%)    Parameters below as of 29 Dec 2023 08:00  Patient On (Oxygen Delivery Method): room air          General:  No acute distress. cranial scar well healed  Eye: no conjunctival pallor, no scleral icterus  Neck: Supple, No lymphadenopathy.  Respiratory: Lungs are clear to auscultation, Respirations are non-labored.  Cardiovascular: Normal rate, Regular rhythm,  s1+s2 +mildred  Gastrointestinal: Soft, upper abdomen non tender, Non-distended, Normal bowel sounds. rectal tube liquid stool  Integumentary: No rash.  Neurologic: Alert, Oriented x1, lethargic No focal deficits  Psychiatric: Appropriate mood & affect.  =======================================================  Labs:                                                   9.6    12.62 )-----------( 237      ( 29 Dec 2023 04:55 )             28.1       12-29    141  |  107  |  21.1<H>  ----------------------------<  133<H>  2.8<LL>   |  22.0  |  0.75    Ca    8.0<L>      29 Dec 2023 04:55  Phos  2.6     12-27  Mg     1.6     12-29    TPro  5.2<L>  /  Alb  2.1<L>  /  TBili  0.6  /  DBili  x   /  AST  24  /  ALT  15  /  AlkPhos  81  12-29              Urinalysis Basic - ( 29 Dec 2023 04:55 )    Color: x / Appearance: x / SG: x / pH: x  Gluc: 133 mg/dL / Ketone: x  / Bili: x / Urobili: x   Blood: x / Protein: x / Nitrite: x   Leuk Esterase: x / RBC: x / WBC x   Sq Epi: x / Non Sq Epi: x / Bacteria: x                  CAPILLARY BLOOD GLUCOSE      POCT Blood Glucose.: 97 mg/dL (27 Dec 2023 23:33)                Urinalysis Basic - ( 28 Dec 2023 05:22 )    Color: x / Appearance: x / SG: x / pH: x  Gluc: 117 mg/dL / Ketone: x  / Bili: x / Urobili: x   Blood: x / Protein: x / Nitrite: x   Leuk Esterase: x / RBC: x / WBC x   Sq Epi: x / Non Sq Epi: x / Bacteria: x                  CAPILLARY BLOOD GLUCOSE      POCT Blood Glucose.: 97 mg/dL (27 Dec 2023 23:33)              Culture - Blood (collected 12-25-23 @ 04:01)  Source: .Blood Blood  Preliminary Report (12-26-23 @ 10:01):    No growth at 24 hours    Culture - Blood (collected 12-25-23 @ 03:51)  Source: .Blood Blood  Preliminary Report (12-26-23 @ 10:01):    No growth at 24 hours    Culture - Blood (collected 12-22-23 @ 22:41)  Source: .Blood Blood-Peripheral  Gram Stain (12-25-23 @ 01:33):    Growth in aerobic bottle: Yeast like cells  Preliminary Report (12-25-23 @ 20:57):    Growth in aerobic bottle: Candida glabrata    Direct identification is available within approximately 3-5    hours either by Blood Panel Multiplexed PCR or Direct    MALDI-TOF. Details: [email or web address]  Organism: Blood Culture PCR (12-25-23 @ 02:54)  Organism: Blood Culture PCR (12-25-23 @ 02:54)    Sensitivities:      Method Type: PCR      -  Candida glabrata: Detec    Culture - Blood (collected 12-22-23 @ 22:30)  Source: .Blood Blood-Peripheral  Gram Stain (12-25-23 @ 11:16):    Growth in aerobic bottle: Yeast like cells  Final Report (12-26-23 @ 12:42):    Growth in aerobic bottle: Candida glabrata    See previous culture [id number]    Culture - Fungal, CSF (collected 12-21-23 @ 19:44)  Source: .CSF CSF  Preliminary Report (12-23-23 @ 15:03):    Culture is being performed. Fungal cultures are held for 4 weeks.    Culture - CSF with Gram Stain (collected 12-21-23 @ 19:44)  Source: .CSF CSF  Gram Stain (12-22-23 @ 02:07):    polymorphonuclear leukocytes seen    No organisms seen    by cytocentrifuge  Preliminary Report (12-22-23 @ 16:44):    No growth    Culture - Acid Fast - CSF (collected 12-21-23 @ 19:44)  Source: .CSF CSF  Preliminary Report (12-23-23 @ 15:08):    Culture is being performed.    Culture - Sputum (collected 12-21-23 @ 10:10)  Source: ET Tube ET Tube  Gram Stain (12-21-23 @ 23:16):    Few polymorphonuclear leukocytes per low power field    Rare Squamous epithelial cells per low power field    Moderate Yeast like cells per oil power field    Moderate Gram Positive Rods per oil power field    Few Gram positive cocci in pairs per oil powerfield    Rare Gram Negative Rods per oil power field  Final Report (12-23-23 @ 12:29):    Numerous Klebsiella aerogenes (Previously Enterobacter)    Normal Respiratory Isabell present  Organism: Klebsiella aerogenes (Previously Enterobacter) (12-23-23 @ 12:29)  Organism: Klebsiella aerogenes (Previously Enterobacter) (12-23-23 @ 12:29)    Sensitivities:      Method Type: CarbaR      -  Resistance Gene to Carbapenem: Nondet  Organism: Klebsiella aerogenes (Previously Enterobacter) (12-23-23 @ 12:29)    Sensitivities:      Method Type: ANDERS      -  Amoxicillin/Clavulanic Acid: R >16/8      -  Ampicillin: R >16 These ampicillin results predict results for amoxicillin      -  Ampicillin/Sulbactam: R 8/4      -  Aztreonam: S <=4      -  Cefazolin: R >16      -  Cefepime: S <=2      -  Cefoxitin: R >16      -  Ceftazidime/Avibactam: S <=4      -  Ceftriaxone: S <=1 Enterobacter cloacae, Klebsiella aerogenes, and Citrobacter freundii may develop resistance during prolonged therapy.      -  Ciprofloxacin: S <=0.25      -  Ertapenem: S <=0.5      -  Gentamicin: S <=2      -  Imipenem: I 2      -  Levofloxacin: S <=0.5      -  Meropenem: S <=1      -  Piperacillin/Tazobactam: S <=8      -  Tobramycin: S <=2      -  Trimethoprim/Sulfamethoxazole: S <=0.5/9.5    Culture - Blood (collected 12-20-23 @ 07:32)  Source: .Blood Blood  Final Report (12-25-23 @ 13:01):    No growth at 5 days    Culture - Blood (collected 12-20-23 @ 07:25)  Source: .Blood Blood  Final Report (12-25-23 @ 13:01):    No growth at 5 days    Culture - Blood (collected 12-10-23 @ 18:23)  Source: .Blood Blood-Peripheral  Final Report (12-15-23 @ 22:01):    No growth at 5 days    Culture - Blood (collected 12-10-23 @ 18:18)  Source: .Blood Blood-Peripheral  Final Report (12-15-23 @ 22:01):    No growth at 5 days    Culture - Sputum (collected 11-22-23 @ 00:08)      No growth at 5 days    Culture - Blood (collected 11-12-23 @ 17:55)  Source: .Blood Blood-Peripheral  Final Report (11-18-23 @ 02:00):    No growth at 5 days    Culture - Blood (collected 11-11-23 @ 16:19)  Source: .Blood Blood  Final Report (11-16-23 @ 23:00):    No growth at 5 days    Culture - Sputum (collected 11-11-23 @ 16:11)  Source: ET Tube ET Tube  Gram Stain (11-12-23 @ 11:50):    Numerous polymorphonuclear leukocytes per low power field    Few Squamous epithelial cells per low power field    Few Yeast like cells per oil power field    Numerous Gram Negative Rods per oil power field  Final Report (11-14-23 @ 08:57):    Numerous Klebsiella aerogenes (Previously Enterobacter)    Normal Respiratory Isabell present  Organism: Klebsiella aerogenes (Previously Enterobacter) (11-14-23 @ 08:57)  Organism: Klebsiella aerogenes (Previously Enterobacter) (11-14-23 @ 08:57)    Sensitivities:      Method Type: ANDERS      -  Amoxicillin/Clavulanic Acid: R >16/8      -  Ampicillin: R <=8 These ampicillin results predict results for amoxicillin      -  Ampicillin/Sulbactam: R <=4/2      -  Aztreonam: S <=4      -  Cefazolin: R >16      -  Cefepime: S <=2      -  Cefotaxime: S Enterobacter cloacae, Klebsiella aerogenes, and Citrobacter freundii may develop resistance during prolonged therapy.      -  Cefoxitin: R >16      -  Ceftazidime: S Enterobacter cloacae, Klebsiella aerogenes, and Citrobacter freundii may develop resistance during prolonged therapy.      -  Ceftriaxone: S <=1 Enterobacter cloacae, Klebsiella aerogenes, and Citrobacter freundii may develop resistance during prolonged therapy.      -  Ciprofloxacin: S <=0.25      -  Ertapenem: S <=0.5      -  Gentamicin: S <=2      -  Imipenem: S <=1      -  Levofloxacin: S <=0.5      -  Meropenem: S <=1      -  Piperacillin/Tazobactam: S <=8      -  Tobramycin: S <=2      -  Trimethoprim/Sulfamethoxazole: S <=0.5/9.5    Culture - Blood (collected 11-11-23 @ 15:50)  Source: .Blood Blood  Final Report (11-16-23 @ 23:00):    No growth at 5 days        < from: CT Angio Abdomen and Pelvis w/ IV Cont (12.21.23 @ 17:13) >  FINDINGS:    CHEST:    LUNGS AND LARGE AIRWAYS:  PLEURA:    Endotracheal tube, tip above the level michael.  The central airways are patent.    There are diffuse bilateral upper and lower lobe groundglass   centrilobular and tree-in-bud nodular opacities  There are bibasilar lower lobe airspace consolidations and left lingula   airspace consolidation.    There is a trace left pleural effusion.    VESSELS:  Atherosclerotic changes thoracic aorta with coronary artery calcification   and/or stent.  No central pulmonary embolism is noted.    HEART:   Heart size is normal.  Aortic and mitral valvular calcification. No pericardial effusion.    MEDIASTINUM AND CHERY:  No enlarged lymphadenopathy.    CHEST WALL AND LOWER NECK: Within normal limits.    ABDOMEN AND PELVIS:    LIVER: Within normal limits.  BILE DUCTS: Normal caliber.  GALLBLADDER: Dependent sludge or gallstones.  SPLEEN: Small, peripheral wedge-shaped defect posterior spleen, could   reflect infarct.  PANCREAS: Within normal limits.  ADRENALS: Within normal limits.  KIDNEYS/URETERS:  Left nephrectomy.  Multiple indeterminate right renal hypodense lesions.  No hydronephrosis.    BLADDER: Collapsed around Vallecillo catheter.  REPRODUCTIVE ORGANS: Mildly prominent prostate gland.    BOWEL:  Nasogastric tube within stomach which is nondistended.  There is a distended stomach to  Air filled colon extending to the rectum without bowel obstruction.  Rectal probe is present.  No extraluminal gas or congenital fluid collection.   PERITONEUM: No ascites.    VESSELS:  Aortobiiliac stent grafting right renal artery graft.    The native aneurysm sac measures 7.1 cm, stable from prior exam.  There is suggestion of contrast within the aneurysm sac near the proximal   stent graft attachment on arterial phase imaging, which becomes more   pronounced on delayed phase imaging (5:62, 63 and  10:114,115).    There is a dilated left common iliac artery measuring, 2 cm.  The iliac arteries are patent.    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  The superior mesenteric artery is patent.  The superior mesenteric vein and portal venous confluence is patent.    RETROPERITONEUM/LYMPH NODES:  No enlarged lymphadenopathy.  No retroperitoneal hematoma.    ABDOMINAL WALL:  Small bilateral fat-containing inguinal hernias.  Right femoral venous catheter.    BONES: Degenerative changes.    IMPRESSION:    Diffuse groundglass centrilobular and tree-in-bud nodular opacities which   is suggestive of infectious or aspiration bronchiolitis.    Bilateral lower lobe airspace consolidations may represent pneumonia   and/or atelectasis.    Findings suggestive of endoleak near the proximal stent graft attachment;   evaluation is somewhat limited without noncontrast exam.  Stable size of the aneurysm sac.    Diffuse colonic fecal retention with colonic distention.  No CT evidence for acute thromboembolic mesenteric ischemia.    Findings were discussed by telephone with Dr. Sorinao at the time of   interpretation on 12/21/2023.    Other findings as discussed above.    --- End of Report ---        < end of copied text >      < from: TTE W or WO Ultrasound Enhancing Agent (12.21.23 @ 19:59) >  CONCLUSIONS:      1. Limited echo to rule out RV strain.   2. Technically difficult image quality.   3. Left ventricular cavity is normal. Left ventricular systolic function is hyperdynamic with an ejection fraction visually estimated at 70 to 75 %.   4. Normal right ventricular cavity size and systolic function.   5. No pericardial effusion seen.    ________________________________________________________________________________________  FINDINGS:     Left Ventricle:  The left ventricular cavity is normal. Left ventricular systolic function is hyperdynamic with an ejection fraction visually estimated at 70 to 75%. Unable to assess left ventricular diastolic function due to insufficient data.     Right Ventricle:  The right ventricular cavity is normal in size and normal systolic function.     Tricuspid Valve:  Structurally normal tricuspid valve with normal leaflet excursion. There is trace tricuspid regurgitation.     Pericardium:  No pericardial effusion seen.     Systemic Veins:  The inferior vena cava is normal in size measuring 1.12 cm in diameter, (normal <2.1cm) with normal inspiratory collapse (normal >50%) consistent with normal right atrial pressure (~3, range 0-5mmHg).                                                [facility]                                                INFECTIOUS DISEASES  =======================================================                   Uriel Vivas#   Roman Gonzáles MD#    Shan Wilson MD*                           Jennie Huerta MD*   Stephany Segura MD*           Diplomates American Board of Internal Medicine & Infectious Diseases                  # [place] Office - Appt - Tel  [phone] Fax [phone]                * [place] Office - Appt - Tel [phone] Fax [phone]                                  Hospital Consult line:  [phone]  =======================================================      [id number]  DEUCE BALL   follow up for: Ifungemia      Patient admitted with right frontal IPH, 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 , LUE DVT, CVA, s/p cranioplasty on 12/7, c/b intracranial bleed, AMS, suspected aspiration and septic shock. Patietn was intubated and ETT cx + enterobacter, BCX + c.glabrata. cta/p showed stable endoleak-no intervention per vascular    pt extubated 12/25, transferred out of icu  12/27 RRT for SVT  12/28 low grade fever, pt  c/o abdominal discomfort  persistent watery stool in rectal tube  c diff +  bp low  pt axo1- lethargic but able to recognise wife at bedside,  denies complaints today  patient seen and examined.       I have personally reviewed the labs and data; pertinent labs and data are listed in this note; please see below.   ===================================================  REVIEW OF SYSTEMS:  limited    =======================================================  Allergies    penicillin (Rash)  heparin (Other (Severe))  penicillin (Hives)  Cipro (Other)  Levaquin (Other)  heparin (Other)    Intolerances    Antibiotics:  caspofungin IVPB      meropenem Injectable 1000 milliGRAM(s) IV Push every 12 hours    Other medications:  atorvastatin 80 milliGRAM(s) Oral at bedtime  chlorhexidine 2% Cloths 1 Application(s) Topical <User Schedule>  doxazosin 2 milliGRAM(s) Oral at bedtime  folic acid 1 milliGRAM(s) Oral daily  levETIRAcetam   Injectable 1000 milliGRAM(s) IV Push every 12 hours  metoprolol tartrate Injectable 5 milliGRAM(s) IV Push every 6 hours  Nephro-roma 1 Tablet(s) Oral daily  pantoprazole  Injectable 40 milliGRAM(s) IV Push two times a day    ======================================================  Physical Exam:  ============  Vital Signs Last 24 Hrs  Vital Signs Last 24 Hrs  T(C): 36.5 (29 Dec 2023 12:00), Max: 37.3 (29 Dec 2023 04:02)  T(F): 97.7 (29 Dec 2023 12:00), Max: 99.1 (29 Dec 2023 04:02)  HR: 99 (29 Dec 2023 12:00) (96 - 106)  BP: 86/57 (29 Dec 2023 12:00) (86/57 - 127/75)  BP(mean): 68 (29 Dec 2023 12:00) (68 - 97)  RR: 18 (29 Dec 2023 12:00) (12 - 31)  SpO2: 94% (29 Dec 2023 12:00) (94% - 100%)    Parameters below as of 29 Dec 2023 08:00  Patient On (Oxygen Delivery Method): room air          General:  No acute distress. cranial scar well healed  Eye: no conjunctival pallor, no scleral icterus  Neck: Supple, No lymphadenopathy.  Respiratory: Lungs are clear to auscultation, Respirations are non-labored.  Cardiovascular: Normal rate, Regular rhythm,  s1+s2 +mildred  Gastrointestinal: Soft, upper abdomen non tender, Non-distended, Normal bowel sounds. rectal tube liquid stool  Integumentary: No rash.  Neurologic: Alert, Oriented x1, lethargic No focal deficits  Psychiatric: Appropriate mood & affect.  =======================================================  Labs:                                                   9.6    12.62 )-----------( 237      ( 29 Dec 2023 04:55 )             28.1       12-29    141  |  107  |  21.1<H>  ----------------------------<  133<H>  2.8<LL>   |  22.0  |  0.75    Ca    8.0<L>      29 Dec 2023 04:55  Phos  2.6     12-27  Mg     1.6     12-29    TPro  5.2<L>  /  Alb  2.1<L>  /  TBili  0.6  /  DBili  x   /  AST  24  /  ALT  15  /  AlkPhos  81  12-29              Urinalysis Basic - ( 29 Dec 2023 04:55 )    Color: x / Appearance: x / SG: x / pH: x  Gluc: 133 mg/dL / Ketone: x  / Bili: x / Urobili: x   Blood: x / Protein: x / Nitrite: x   Leuk Esterase: x / RBC: x / WBC x   Sq Epi: x / Non Sq Epi: x / Bacteria: x                  CAPILLARY BLOOD GLUCOSE      POCT Blood Glucose.: 97 mg/dL (27 Dec 2023 23:33)                Urinalysis Basic - ( 28 Dec 2023 05:22 )    Color: x / Appearance: x / SG: x / pH: x  Gluc: 117 mg/dL / Ketone: x  / Bili: x / Urobili: x   Blood: x / Protein: x / Nitrite: x   Leuk Esterase: x / RBC: x / WBC x   Sq Epi: x / Non Sq Epi: x / Bacteria: x                  CAPILLARY BLOOD GLUCOSE      POCT Blood Glucose.: 97 mg/dL (27 Dec 2023 23:33)              Culture - Blood (collected 12-25-23 @ 04:01)  Source: .Blood Blood  Preliminary Report (12-26-23 @ 10:01):    No growth at 24 hours    Culture - Blood (collected 12-25-23 @ 03:51)  Source: .Blood Blood  Preliminary Report (12-26-23 @ 10:01):    No growth at 24 hours    Culture - Blood (collected 12-22-23 @ 22:41)  Source: .Blood Blood-Peripheral  Gram Stain (12-25-23 @ 01:33):    Growth in aerobic bottle: Yeast like cells  Preliminary Report (12-25-23 @ 20:57):    Growth in aerobic bottle: Candida glabrata    Direct identification is available within approximately 3-5    hours either by Blood Panel Multiplexed PCR or Direct    MALDI-TOF. Details: [email or web address]  Organism: Blood Culture PCR (12-25-23 @ 02:54)  Organism: Blood Culture PCR (12-25-23 @ 02:54)    Sensitivities:      Method Type: PCR      -  Candida glabrata: Detec    Culture - Blood (collected 12-22-23 @ 22:30)  Source: .Blood Blood-Peripheral  Gram Stain (12-25-23 @ 11:16):    Growth in aerobic bottle: Yeast like cells  Final Report (12-26-23 @ 12:42):    Growth in aerobic bottle: Candida glabrata    See previous culture [id number]    Culture - Fungal, CSF (collected 12-21-23 @ 19:44)  Source: .CSF CSF  Preliminary Report (12-23-23 @ 15:03):    Culture is being performed. Fungal cultures are held for 4 weeks.    Culture - CSF with Gram Stain (collected 12-21-23 @ 19:44)  Source: .CSF CSF  Gram Stain (12-22-23 @ 02:07):    polymorphonuclear leukocytes seen    No organisms seen    by cytocentrifuge  Preliminary Report (12-22-23 @ 16:44):    No growth    Culture - Acid Fast - CSF (collected 12-21-23 @ 19:44)  Source: .CSF CSF  Preliminary Report (12-23-23 @ 15:08):    Culture is being performed.    Culture - Sputum (collected 12-21-23 @ 10:10)  Source: ET Tube ET Tube  Gram Stain (12-21-23 @ 23:16):    Few polymorphonuclear leukocytes per low power field    Rare Squamous epithelial cells per low power field    Moderate Yeast like cells per oil power field    Moderate Gram Positive Rods per oil power field    Few Gram positive cocci in pairs per oil powerfield    Rare Gram Negative Rods per oil power field  Final Report (12-23-23 @ 12:29):    Numerous Klebsiella aerogenes (Previously Enterobacter)    Normal Respiratory Isabell present  Organism: Klebsiella aerogenes (Previously Enterobacter) (12-23-23 @ 12:29)  Organism: Klebsiella aerogenes (Previously Enterobacter) (12-23-23 @ 12:29)    Sensitivities:      Method Type: CarbaR      -  Resistance Gene to Carbapenem: Nondet  Organism: Klebsiella aerogenes (Previously Enterobacter) (12-23-23 @ 12:29)    Sensitivities:      Method Type: ANDERS      -  Amoxicillin/Clavulanic Acid: R >16/8      -  Ampicillin: R >16 These ampicillin results predict results for amoxicillin      -  Ampicillin/Sulbactam: R 8/4      -  Aztreonam: S <=4      -  Cefazolin: R >16      -  Cefepime: S <=2      -  Cefoxitin: R >16      -  Ceftazidime/Avibactam: S <=4      -  Ceftriaxone: S <=1 Enterobacter cloacae, Klebsiella aerogenes, and Citrobacter freundii may develop resistance during prolonged therapy.      -  Ciprofloxacin: S <=0.25      -  Ertapenem: S <=0.5      -  Gentamicin: S <=2      -  Imipenem: I 2      -  Levofloxacin: S <=0.5      -  Meropenem: S <=1      -  Piperacillin/Tazobactam: S <=8      -  Tobramycin: S <=2      -  Trimethoprim/Sulfamethoxazole: S <=0.5/9.5    Culture - Blood (collected 12-20-23 @ 07:32)  Source: .Blood Blood  Final Report (12-25-23 @ 13:01):    No growth at 5 days    Culture - Blood (collected 12-20-23 @ 07:25)  Source: .Blood Blood  Final Report (12-25-23 @ 13:01):    No growth at 5 days    Culture - Blood (collected 12-10-23 @ 18:23)  Source: .Blood Blood-Peripheral  Final Report (12-15-23 @ 22:01):    No growth at 5 days    Culture - Blood (collected 12-10-23 @ 18:18)  Source: .Blood Blood-Peripheral  Final Report (12-15-23 @ 22:01):    No growth at 5 days    Culture - Sputum (collected 11-22-23 @ 00:08)      No growth at 5 days    Culture - Blood (collected 11-12-23 @ 17:55)  Source: .Blood Blood-Peripheral  Final Report (11-18-23 @ 02:00):    No growth at 5 days    Culture - Blood (collected 11-11-23 @ 16:19)  Source: .Blood Blood  Final Report (11-16-23 @ 23:00):    No growth at 5 days    Culture - Sputum (collected 11-11-23 @ 16:11)  Source: ET Tube ET Tube  Gram Stain (11-12-23 @ 11:50):    Numerous polymorphonuclear leukocytes per low power field    Few Squamous epithelial cells per low power field    Few Yeast like cells per oil power field    Numerous Gram Negative Rods per oil power field  Final Report (11-14-23 @ 08:57):    Numerous Klebsiella aerogenes (Previously Enterobacter)    Normal Respiratory Isabell present  Organism: Klebsiella aerogenes (Previously Enterobacter) (11-14-23 @ 08:57)  Organism: Klebsiella aerogenes (Previously Enterobacter) (11-14-23 @ 08:57)    Sensitivities:      Method Type: ANDERS      -  Amoxicillin/Clavulanic Acid: R >16/8      -  Ampicillin: R <=8 These ampicillin results predict results for amoxicillin      -  Ampicillin/Sulbactam: R <=4/2      -  Aztreonam: S <=4      -  Cefazolin: R >16      -  Cefepime: S <=2      -  Cefotaxime: S Enterobacter cloacae, Klebsiella aerogenes, and Citrobacter freundii may develop resistance during prolonged therapy.      -  Cefoxitin: R >16      -  Ceftazidime: S Enterobacter cloacae, Klebsiella aerogenes, and Citrobacter freundii may develop resistance during prolonged therapy.      -  Ceftriaxone: S <=1 Enterobacter cloacae, Klebsiella aerogenes, and Citrobacter freundii may develop resistance during prolonged therapy.      -  Ciprofloxacin: S <=0.25      -  Ertapenem: S <=0.5      -  Gentamicin: S <=2      -  Imipenem: S <=1      -  Levofloxacin: S <=0.5      -  Meropenem: S <=1      -  Piperacillin/Tazobactam: S <=8      -  Tobramycin: S <=2      -  Trimethoprim/Sulfamethoxazole: S <=0.5/9.5    Culture - Blood (collected 11-11-23 @ 15:50)  Source: .Blood Blood  Final Report (11-16-23 @ 23:00):    No growth at 5 days        < from: CT Angio Abdomen and Pelvis w/ IV Cont (12.21.23 @ 17:13) >  FINDINGS:    CHEST:    LUNGS AND LARGE AIRWAYS:  PLEURA:    Endotracheal tube, tip above the level michael.  The central airways are patent.    There are diffuse bilateral upper and lower lobe groundglass   centrilobular and tree-in-bud nodular opacities  There are bibasilar lower lobe airspace consolidations and left lingula   airspace consolidation.    There is a trace left pleural effusion.    VESSELS:  Atherosclerotic changes thoracic aorta with coronary artery calcification   and/or stent.  No central pulmonary embolism is noted.    HEART:   Heart size is normal.  Aortic and mitral valvular calcification. No pericardial effusion.    MEDIASTINUM AND CHERY:  No enlarged lymphadenopathy.    CHEST WALL AND LOWER NECK: Within normal limits.    ABDOMEN AND PELVIS:    LIVER: Within normal limits.  BILE DUCTS: Normal caliber.  GALLBLADDER: Dependent sludge or gallstones.  SPLEEN: Small, peripheral wedge-shaped defect posterior spleen, could   reflect infarct.  PANCREAS: Within normal limits.  ADRENALS: Within normal limits.  KIDNEYS/URETERS:  Left nephrectomy.  Multiple indeterminate right renal hypodense lesions.  No hydronephrosis.    BLADDER: Collapsed around Vallecillo catheter.  REPRODUCTIVE ORGANS: Mildly prominent prostate gland.    BOWEL:  Nasogastric tube within stomach which is nondistended.  There is a distended stomach to  Air filled colon extending to the rectum without bowel obstruction.  Rectal probe is present.  No extraluminal gas or congenital fluid collection.   PERITONEUM: No ascites.    VESSELS:  Aortobiiliac stent grafting right renal artery graft.    The native aneurysm sac measures 7.1 cm, stable from prior exam.  There is suggestion of contrast within the aneurysm sac near the proximal   stent graft attachment on arterial phase imaging, which becomes more   pronounced on delayed phase imaging (5:62, 63 and  10:114,115).    There is a dilated left common iliac artery measuring, 2 cm.  The iliac arteries are patent.    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  The superior mesenteric artery is patent.  The superior mesenteric vein and portal venous confluence is patent.    RETROPERITONEUM/LYMPH NODES:  No enlarged lymphadenopathy.  No retroperitoneal hematoma.    ABDOMINAL WALL:  Small bilateral fat-containing inguinal hernias.  Right femoral venous catheter.    BONES: Degenerative changes.    IMPRESSION:    Diffuse groundglass centrilobular and tree-in-bud nodular opacities which   is suggestive of infectious or aspiration bronchiolitis.    Bilateral lower lobe airspace consolidations may represent pneumonia   and/or atelectasis.    Findings suggestive of endoleak near the proximal stent graft attachment;   evaluation is somewhat limited without noncontrast exam.  Stable size of the aneurysm sac.    Diffuse colonic fecal retention with colonic distention.  No CT evidence for acute thromboembolic mesenteric ischemia.    Findings were discussed by telephone with Dr. Soriano at the time of   interpretation on 12/21/2023.    Other findings as discussed above.    --- End of Report ---        < end of copied text >      < from: TTE W or WO Ultrasound Enhancing Agent (12.21.23 @ 19:59) >  CONCLUSIONS:      1. Limited echo to rule out RV strain.   2. Technically difficult image quality.   3. Left ventricular cavity is normal. Left ventricular systolic function is hyperdynamic with an ejection fraction visually estimated at 70 to 75 %.   4. Normal right ventricular cavity size and systolic function.   5. No pericardial effusion seen.    ________________________________________________________________________________________  FINDINGS:     Left Ventricle:  The left ventricular cavity is normal. Left ventricular systolic function is hyperdynamic with an ejection fraction visually estimated at 70 to 75%. Unable to assess left ventricular diastolic function due to insufficient data.     Right Ventricle:  The right ventricular cavity is normal in size and normal systolic function.     Tricuspid Valve:  Structurally normal tricuspid valve with normal leaflet excursion. There is trace tricuspid regurgitation.     Pericardium:  No pericardial effusion seen.     Systemic Veins:  The inferior vena cava is normal in size measuring 1.12 cm in diameter, (normal <2.1cm) with normal inspiratory collapse (normal >50%) consistent with normal right atrial pressure (~3, range 0-5mmHg).

## 2023-12-29 NOTE — PHYSICAL THERAPY INITIAL EVALUATION ADULT - LEVEL OF CONSCIOUSNESS, REHAB EVAL
lethargic/somnolent
alert/confused
lethargic/somnolent
lethargic/somnolent/confused
lethargic/somnolent

## 2023-12-29 NOTE — PHYSICAL THERAPY INITIAL EVALUATION ADULT - REHAB POTENTIAL, PT EVAL
good, to achieve stated therapy goals
fair, will monitor progress closely
fair, will monitor progress closely
good, to achieve stated therapy goals
fair, will monitor progress closely

## 2023-12-29 NOTE — PHYSICAL THERAPY INITIAL EVALUATION ADULT - GROSSLY INTACT, SENSORY
difficulty formally assessing, intact to painful stimuli
Sensory deficits to BUEs and BLEs, however pt may have increased difficulty participating in sensory testing due to cognitive and communication impairments.
+ painful withdrawal on left LE

## 2023-12-29 NOTE — PHYSICAL THERAPY INITIAL EVALUATION ADULT - LEVEL OF INDEPENDENCE, REHAB EVAL
due to multiple lines present and pt unable to participate in rolling due to weakness, however it is PTs opinion that pt would be dependent for all mobility at this time./unable to perform
dependent (less than 25% patients effort)

## 2023-12-29 NOTE — PROVIDER CONTACT NOTE (CRITICAL VALUE NOTIFICATION) - NAME OF MD/NP/PA/DO NOTIFIED:
Nanci Yanes
DEBRA Beckman
Dr. Flores
Dr. Prado
Dr Prado
ELIZABETH Coelho
Alize Guillory
Dory RICHARDSON

## 2023-12-29 NOTE — PHYSICAL THERAPY INITIAL EVALUATION ADULT - PRECAUTIONS/LIMITATIONS, REHAB EVAL
aspiration precautions/fall precautions/seizure precautions
helmet while OOB/aspiration precautions/fall precautions/seizure precautions
fall precautions
fall precautions/surgical precautions
contact isolation: C. Diff, Klebs. No BP RUE due to midline, Left arm resting splint/isolation precautions
Nursing home

## 2023-12-29 NOTE — PHYSICAL THERAPY INITIAL EVALUATION ADULT - CRITERIA FOR SKILLED THERAPEUTIC INTERVENTIONS
impairments found/functional limitations in following categories
impairments found/functional limitations in following categories/risk reduction/prevention/rehab potential/therapy frequency/predicted duration of therapy intervention/anticipated equipment needs at discharge/anticipated discharge recommendation
HOLLY/impairments found/anticipated equipment needs at discharge/anticipated discharge recommendation
HOLLY/impairments found/functional limitations in following categories/anticipated discharge recommendation
impairments found/functional limitations in following categories

## 2023-12-29 NOTE — PHYSICAL THERAPY INITIAL EVALUATION ADULT - FUNCTIONAL LIMITATIONS, PT EVAL
self-care/home management/community/leisure
self-care/home management/work

## 2023-12-29 NOTE — PHYSICAL THERAPY INITIAL EVALUATION ADULT - PERTINENT HX OF CURRENT PROBLEM, REHAB EVAL
PMH from EMR: HTN, CAD s/p stents, renal cell carcinoma status post left nephrectomy, bladder CA, BPH, CHF, LBBB, vertigo,  HLD, 5.5cm AAA without rupture post EVAR, paroxysmal A-fib on Eliquis, Plavix, and aspirin, early stage Alzheimer dementia     HPI per EMR: transferred from [facility] s/p unwitnessed fall with head strike at home found by wife on floor at 8am. Patient brought to urgent care by wife and had 5 staples to posterior scalp but was instructed to go to nearest ED.  CT head at [facility] showed R frontal IPH, SDH, SAH s/p decompressive craniectomy 11/10. Initial TTE with ?  AV vegetation vs calcification vs artifact, repeat TTE 11/21/23 nondiagnostic, family refused WHIT.   Treated for klebseila aerogenes tracheobronchitis,  LUE DVT, CVA, s/p cranioplasty on 12/7,   Hospital course c/b intracranial bleed, AMS, vomiting and suspected aspiration and septic shock. Patient  intubated 12/21 and ETT cx + enterobacter (I) to imi (repest testing requested), BCX + c.glabrata. cta/p showed stable endoleak-no intervention per vascular PMH from EMR: HTN, CAD s/p stents, renal cell carcinoma status post left nephrectomy, bladder CA, BPH, CHF, LBBB, vertigo,  HLD, 5.5cm AAA without rupture post EVAR, paroxysmal A-fib on Eliquis, Plavix, and aspirin, early stage Alzheimer dementia     HPI per EMR: transferred from [facility] s/p unwitnessed fall with head strike at home found by wife on floor at 8am. Patient brought to urgent care by wife and had 5 staples to posterior scalp but was instructed to go to nearest ED.  CT head at [facility] showed R frontal IPH, SDH, SAH s/p decompressive craniectomy 11/10. Initial TTE with ?  AV vegetation vs calcification vs artifact, repeat TTE 11/21/23 nondiagnostic, family refused WHIT.   Treated for klebseila aerogenes tracheobronchitis,  LUE DVT, CVA, s/p cranioplasty on 12/7,   Hospital course c/b intracranial bleed, AMS, vomiting and suspected aspiration and septic shock. Patient  intubated 12/21 and ETT cx + enterobacter (I) to imi (repest testing requested), BCX + c.glabrata. cta/p showed stable endoleak-no intervention per vascular

## 2023-12-29 NOTE — PHYSICAL THERAPY INITIAL EVALUATION ADULT - ADDITIONAL COMMENTS
Pt and pts wife provide prior level of function and social history to PT.  Pt lives in a private home with his wife. there are 2 RICKI with 2 HR and 1 flight of interior stairs with 2 HR.  Prior to hospitalization pt was I in all ADLs and ambulation without use of assistive devices.
Pt lethargic and not responding to questions- info obtained from last note.  As per previous PT evaluation "lives in basement apartment with wife who works unable to assist 24/7. Pt has 6 RICKI down with 1 handrail and no stairs inside. Independent prior to admission. Owns no DME"
Pt is not a reliable historian, per previous PT evaluation "lives in basement apartment with wife who works unable to assist 24/7. Pt has 6 RICKI down with 1 handrail and no stairs inside. Independent prior to admission. Owns no DME"
Pt lethargic difficulty obtaining info. Obtained from [facility] - lives in basement apartment with wife who works unable to assist 24/7. Pt has 6 RIKCI down with 1 handrail and no stairs inside. Independent prior to admission. Owns no DME
Pt is not a reliable historian, per previous PT evaluation "lives in basement apartment with wife who works unable to assist 24/7. Pt has 6 RICKI down with 1 handrail and no stairs inside. Independent prior to admission. Owns no DME"

## 2023-12-30 LAB
ANION GAP SERPL CALC-SCNC: 9 MMOL/L — SIGNIFICANT CHANGE UP (ref 5–17)
ANION GAP SERPL CALC-SCNC: 9 MMOL/L — SIGNIFICANT CHANGE UP (ref 5–17)
BUN SERPL-MCNC: 18.7 MG/DL — SIGNIFICANT CHANGE UP (ref 8–20)
BUN SERPL-MCNC: 18.7 MG/DL — SIGNIFICANT CHANGE UP (ref 8–20)
CALCIUM SERPL-MCNC: 7.7 MG/DL — LOW (ref 8.4–10.5)
CALCIUM SERPL-MCNC: 7.7 MG/DL — LOW (ref 8.4–10.5)
CHLORIDE SERPL-SCNC: 104 MMOL/L — SIGNIFICANT CHANGE UP (ref 96–108)
CHLORIDE SERPL-SCNC: 104 MMOL/L — SIGNIFICANT CHANGE UP (ref 96–108)
CO2 SERPL-SCNC: 24 MMOL/L — SIGNIFICANT CHANGE UP (ref 22–29)
CO2 SERPL-SCNC: 24 MMOL/L — SIGNIFICANT CHANGE UP (ref 22–29)
CREAT SERPL-MCNC: 0.67 MG/DL — SIGNIFICANT CHANGE UP (ref 0.5–1.3)
CREAT SERPL-MCNC: 0.67 MG/DL — SIGNIFICANT CHANGE UP (ref 0.5–1.3)
CULTURE RESULTS: SIGNIFICANT CHANGE UP
EGFR: 93 ML/MIN/1.73M2 — SIGNIFICANT CHANGE UP
EGFR: 93 ML/MIN/1.73M2 — SIGNIFICANT CHANGE UP
GALACTOMANNAN AG SERPL-ACNC: 0.07 INDEX — SIGNIFICANT CHANGE UP (ref 0–0.49)
GALACTOMANNAN AG SERPL-ACNC: 0.07 INDEX — SIGNIFICANT CHANGE UP (ref 0–0.49)
GLUCOSE SERPL-MCNC: 138 MG/DL — HIGH (ref 70–99)
GLUCOSE SERPL-MCNC: 138 MG/DL — HIGH (ref 70–99)
HCT VFR BLD CALC: 29 % — LOW (ref 39–50)
HCT VFR BLD CALC: 29 % — LOW (ref 39–50)
HGB BLD-MCNC: 9.7 G/DL — LOW (ref 13–17)
HGB BLD-MCNC: 9.7 G/DL — LOW (ref 13–17)
MCHC RBC-ENTMCNC: 30.2 PG — SIGNIFICANT CHANGE UP (ref 27–34)
MCHC RBC-ENTMCNC: 30.2 PG — SIGNIFICANT CHANGE UP (ref 27–34)
MCHC RBC-ENTMCNC: 33.4 GM/DL — SIGNIFICANT CHANGE UP (ref 32–36)
MCHC RBC-ENTMCNC: 33.4 GM/DL — SIGNIFICANT CHANGE UP (ref 32–36)
MCV RBC AUTO: 90.3 FL — SIGNIFICANT CHANGE UP (ref 80–100)
MCV RBC AUTO: 90.3 FL — SIGNIFICANT CHANGE UP (ref 80–100)
PLATELET # BLD AUTO: 260 K/UL — SIGNIFICANT CHANGE UP (ref 150–400)
PLATELET # BLD AUTO: 260 K/UL — SIGNIFICANT CHANGE UP (ref 150–400)
POTASSIUM SERPL-MCNC: 3.2 MMOL/L — LOW (ref 3.5–5.3)
POTASSIUM SERPL-MCNC: 3.2 MMOL/L — LOW (ref 3.5–5.3)
POTASSIUM SERPL-SCNC: 3.2 MMOL/L — LOW (ref 3.5–5.3)
POTASSIUM SERPL-SCNC: 3.2 MMOL/L — LOW (ref 3.5–5.3)
RBC # BLD: 3.21 M/UL — LOW (ref 4.2–5.8)
RBC # BLD: 3.21 M/UL — LOW (ref 4.2–5.8)
RBC # FLD: 15.7 % — HIGH (ref 10.3–14.5)
RBC # FLD: 15.7 % — HIGH (ref 10.3–14.5)
SODIUM SERPL-SCNC: 136 MMOL/L — SIGNIFICANT CHANGE UP (ref 135–145)
SODIUM SERPL-SCNC: 136 MMOL/L — SIGNIFICANT CHANGE UP (ref 135–145)
SPECIMEN SOURCE: SIGNIFICANT CHANGE UP
WBC # BLD: 12.83 K/UL — HIGH (ref 3.8–10.5)
WBC # BLD: 12.83 K/UL — HIGH (ref 3.8–10.5)
WBC # FLD AUTO: 12.83 K/UL — HIGH (ref 3.8–10.5)
WBC # FLD AUTO: 12.83 K/UL — HIGH (ref 3.8–10.5)

## 2023-12-30 PROCEDURE — 99232 SBSQ HOSP IP/OBS MODERATE 35: CPT

## 2023-12-30 RX ORDER — POTASSIUM CHLORIDE 20 MEQ
40 PACKET (EA) ORAL ONCE
Refills: 0 | Status: COMPLETED | OUTPATIENT
Start: 2023-12-30 | End: 2023-12-30

## 2023-12-30 RX ORDER — METOPROLOL TARTRATE 50 MG
25 TABLET ORAL EVERY 6 HOURS
Refills: 0 | Status: DISCONTINUED | OUTPATIENT
Start: 2023-12-30 | End: 2024-01-12

## 2023-12-30 RX ORDER — POTASSIUM BICARBONATE 978 MG/1
40 TABLET, EFFERVESCENT ORAL ONCE
Refills: 0 | Status: DISCONTINUED | OUTPATIENT
Start: 2023-12-30 | End: 2023-12-30

## 2023-12-30 RX ADMIN — Medication 25 MILLIGRAM(S): at 17:56

## 2023-12-30 RX ADMIN — Medication 40 MILLIEQUIVALENT(S): at 17:55

## 2023-12-30 RX ADMIN — PANTOPRAZOLE SODIUM 40 MILLIGRAM(S): 20 TABLET, DELAYED RELEASE ORAL at 17:56

## 2023-12-30 RX ADMIN — Medication 2 MILLIGRAM(S): at 23:13

## 2023-12-30 RX ADMIN — Medication 25 MILLIGRAM(S): at 23:13

## 2023-12-30 RX ADMIN — Medication 5 MILLIGRAM(S): at 07:58

## 2023-12-30 RX ADMIN — Medication 1 TABLET(S): at 14:40

## 2023-12-30 RX ADMIN — PANTOPRAZOLE SODIUM 40 MILLIGRAM(S): 20 TABLET, DELAYED RELEASE ORAL at 07:57

## 2023-12-30 RX ADMIN — Medication 1 MILLIGRAM(S): at 14:39

## 2023-12-30 RX ADMIN — ATORVASTATIN CALCIUM 80 MILLIGRAM(S): 80 TABLET, FILM COATED ORAL at 23:11

## 2023-12-30 RX ADMIN — CHLORHEXIDINE GLUCONATE 1 APPLICATION(S): 213 SOLUTION TOPICAL at 08:05

## 2023-12-30 RX ADMIN — Medication 125 MILLIGRAM(S): at 10:14

## 2023-12-30 RX ADMIN — Medication 125 MILLIGRAM(S): at 01:09

## 2023-12-30 RX ADMIN — Medication 125 MILLIGRAM(S): at 23:44

## 2023-12-30 RX ADMIN — Medication 125 MILLIGRAM(S): at 18:12

## 2023-12-30 RX ADMIN — Medication 125 MILLIGRAM(S): at 14:42

## 2023-12-30 RX ADMIN — CASPOFUNGIN ACETATE 260 MILLIGRAM(S): 7 INJECTION, POWDER, LYOPHILIZED, FOR SOLUTION INTRAVENOUS at 14:38

## 2023-12-30 NOTE — PROGRESS NOTE ADULT - PROBLEM SELECTOR PROBLEM 2
Preop cardiovascular exam
DONNIE (acute kidney injury)
R/O Endocarditis
Fungemia
Fungemia
R/O Endocarditis
Fungemia
Atrial tachycardia
Fungemia

## 2023-12-30 NOTE — PROGRESS NOTE ADULT - PROBLEM SELECTOR PROBLEM 7
Unknown
Anemia of chronic disease

## 2023-12-30 NOTE — PROGRESS NOTE ADULT - ASSESSMENT
83y Male with large right hemisphere intracranial hemorrhage now status post hemicraniectomy for evacuation and relief of pressure.  Patient was on anticoagulation for atrial fibrillation prior to ICH.   MRI showed acute lacunar infarcts in addition to the hemorrhages.  This was felt to be of embolic origin although anticoagulation could not be restarted secondary to the hemorrhage.   EEG showed sharp waves from the area of the hematoma and the patient had been on Keppra.     Intracranial hemorrhage   Supportive care.   Control blood pressure.   Anticoagulation on hold.   EEG shows potential epileptogenic focus in the right anterior head region with underlying cerebral dysfunction and evidence for overlying skull defect.  I spoke with his daughter (his HCP) who does not want to start any anti-seizure medications at this time.  She seemed to understand that he is ask risk for seizures due to ICH  Seen by speech pathology and not tolerating PO diet.  Will need to consider PEG.    Infection.  Antibiotics/antifungals per ID.    Given that his daughter requests no anti-seizure medication be given and there is no acute neurological issues otherwise regarding his ICH we will sign off.  Please recall if he has seizure activity and his daughter is willing to reconsider anti-seizure medications     Thank you for allowing me to participate in the care of your patient    Jaren Moreno MD, PhD   [id number]     83y Male with large right hemisphere intracranial hemorrhage now status post hemicraniectomy for evacuation and relief of pressure.  Patient was on anticoagulation for atrial fibrillation prior to ICH.   MRI showed acute lacunar infarcts in addition to the hemorrhages.  This was felt to be of embolic origin although anticoagulation could not be restarted secondary to the hemorrhage.   EEG showed sharp waves from the area of the hematoma and the patient had been on Keppra.     Intracranial hemorrhage   Supportive care.   Control blood pressure.   Anticoagulation on hold.   EEG shows potential epileptogenic focus in the right anterior head region with underlying cerebral dysfunction and evidence for overlying skull defect.  I spoke with his daughter (his HCP) who does not want to start any anti-seizure medications at this time.  She seemed to understand that he is ask risk for seizures due to ICH  Seen by speech pathology and not tolerating PO diet.  Will need to consider PEG.    Infection.  Antibiotics/antifungals per ID.    Given that his daughter requests no anti-seizure medication be given and there is no acute neurological issues otherwise regarding his ICH we will sign off.  Please recall if he has seizure activity and his daughter is willing to reconsider anti-seizure medications     Thank you for allowing me to participate in the care of your patient    Jaren Moreno MD, PhD   [phone]

## 2023-12-30 NOTE — PROGRESS NOTE ADULT - SUBJECTIVE AND OBJECTIVE BOX
[facility] Division of Hospital Medicine      Hospital course: 82y M with PMH HTN, CAD s/p stents, renal cell carcinoma status post left nephrectomy, bladder CA, BPH, CHF, LBBB, vertigo,  HLD, 5.5cm AAA without rupture post EVAR, paroxysmal A-fib on Eliquis, Plavix, and aspirin, early stage Alzheimer dementia transferred from [facility] s/p unwitnessed fall with head strike at home found by wife on floor at 8am. Patient brought to urgent care by wife and had 5 staples to posterior scalp but was instructed to go to nearest ED.  CT head at [place] showed R frontal IPH, SDH, SAH at 9:00. CTA stroke protocol not performed at [facility]. Patient BIB on 6L NC.  Pt GCS 15 on arrival, A&Ox2 on arrival. After initial assessment, patient noted to be vomiting enroute to CT scanner.   admitted 11/10 with prolonged hospitalization here.  being found to have right frontal IPH, 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  Extubated 11/13. Course complicated by Acute Kidney Injury, afib, EEG with epileptiform discharges. Patient was trasnferred to step down unit 11/23. TTE had shown possible vegetation on valve but repeat was not diagnostic and blood cultures were negative. Course further complicated by deep vein thrombosis or LUE brachial veins. Patient course further complicated by stroke found on MRI, WHIT recommended but family declined. He subsequently underwent cranioplasty on 12/7, drain removed 12/10. He had worsening of brain bleed 12/15, taken off anticoagulation and reversed. Patient then had acute decompensation on 12/21 for worsening mental status, vomiting, aspiration, and shock, requiring transfer to Intensive care unit for intubation, and pressors. Further complications including acute blood loss anemia requiring PRBCs, klebsiella pneumonia afib with RVR. He is now s/p extubation 12/25.         Chart reviewed   Patient seen and examined lying inbed, no respiratory distress, nurse present at the bedside and patient's girl friend   overnight: no new events per nurse. the patient is still having loose stools      SUBJECTIVE  . He  denies  head ache no dizziness. chest pain,  no shortness of breath, no nausea, no vomiting, no abdominal pain. The patient has no other complaints.    MEDICATIONS  (STANDING):  atorvastatin 80 milliGRAM(s) Oral at bedtime  caspofungin IVPB      caspofungin IVPB 50 milliGRAM(s) IV Intermittent every 24 hours  chlorhexidine 2% Cloths 1 Application(s) Topical <User Schedule>  dextrose 5%. 1000 milliLiter(s) (83 mL/Hr) IV Continuous <Continuous>  doxazosin 2 milliGRAM(s) Oral at bedtime  folic acid 1 milliGRAM(s) Oral daily  lacosamide IVPB 150 milliGRAM(s) IV Intermittent every 12 hours  lactated ringers Bolus 500 milliLiter(s) IV Bolus once  meropenem Injectable 1000 milliGRAM(s) IV Push every 8 hours  metoprolol tartrate Injectable 5 milliGRAM(s) IV Push every 6 hours  Nephro-roma 1 Tablet(s) Oral daily  pantoprazole  Injectable 40 milliGRAM(s) IV Push two times a day  vancomycin    Solution 125 milliGRAM(s) Oral every 6 hours    MEDICATIONS  (PRN):  acetaminophen     Tablet .. 650 milliGRAM(s) Oral every 6 hours PRN Temp greater or equal to 38C (100.4F), Mild Pain (1 - 3)  artificial tears (preservative free) Ophthalmic Solution 1 Drop(s) Both EYES every 6 hours PRN Dry Eyes        I&O's Summary    I&O's Detail    29 Dec 2023 07:01  -  30 Dec 2023 07:00  --------------------------------------------------------  IN:    dextrose 5%: 498 mL    IV PiggyBack: 260 mL    IV PiggyBack: 50 mL    Lactated Ringers w/ Additives: 225 mL  Total IN: 1033 mL    OUT:    Indwelling Catheter - Urethral (mL): 2425 mL  Total OUT: 2425 mL    Total NET: -1392 mL      30 Dec 2023 07:01  -  30 Dec 2023 13:21  --------------------------------------------------------  IN:  Total IN: 0 mL    OUT:    Indwelling Catheter - Urethral (mL): 20 mL  Total OUT: 20 mL    Total NET: -20 mL            PHYSICAL EXAM:  ICU Vital Signs Last 24 Hrs  T(C): 36.3 (30 Dec 2023 12:12), Max: 37.2 (30 Dec 2023 07:44)  T(F): 97.4 (30 Dec 2023 12:12), Max: 98.9 (30 Dec 2023 07:44)  HR: 103 (30 Dec 2023 06:00) (85 - 107)  BP: 139/104 (30 Dec 2023 06:00) (87/75 - 139/104)  BP(mean): 116 (30 Dec 2023 06:00) (80 - 116)  ABP: --  ABP(mean): --  RR: 19 (30 Dec 2023 06:00) (18 - 19)  SpO2: 95% (30 Dec 2023 06:00) (94% - 96%)    O2 Parameters below as of 30 Dec 2023 06:00  Patient On (Oxygen Delivery Method): room air                    CONSTITUTIONAL: no respiratory distress.  HENMT: Scar on the right side of his head. not pale, anicteric ,moist oral mucosa,  RESPIRATORY: vesicular breath sounds,  no wheezing, no crepitations,  CARDIOVASCULAR: Regular rate and rhythm, normal S1 and S2, no murmur/rub/gallop;   ABDOMEN: full soft ,nontender to palpation, normoactive bowel sounds, no rebound/guarding; No hepatosplenomegaly rectal tube with water diarrhea  MUSCLOSKELETAL: no clubbing or cyanosis of digits left arm in a splint and left leg in a splints  PSYCH:  alert , not oriented ot person , place, no time  NEUROLOGY: alert. Patient does not follow commands most of the time.  SKIN: No rashes; no palpable lesions    LABS:           CBC:            9.7    12.83 )-----------( 260      ( 12-30-23 @ 10:53 )             29.0         Chem:         ( 12-30-23 @ 10:53 )    136  |  104  |  18.7  ----------------------------<  138<H>  3.2<L>   |  24.0  |  0.67        Liver Functions: ( 12-29-23 @ 04:55 )  Alb: 2.1 g/dL / Pro: 5.2 g/dL / ALK PHOS: 81 U/L / ALT: 15 U/L / AST: 24 U/L / GGT: x              Type & Screen:               Urinalysis Basic - ( 29 Dec 2023 04:55 )    Color: x / Appearance: x / SG: x / pH: x  Gluc: 133 mg/dL / Ketone: x  / Bili: x / Urobili: x   Blood: x / Protein: x / Nitrite: x   Leuk Esterase: x / RBC: x / WBC x   Sq Epi: x / Non Sq Epi: x / Bacteria: x        Culture - Blood (collected 28 Dec 2023 05:22)  Source: .Blood Blood-Peripheral  Preliminary Report (29 Dec 2023 14:04):    No growth at 24 hours    Culture - Blood (collected 28 Dec 2023 05:22)  Source: .Blood Blood-Peripheral  Preliminary Report (29 Dec 2023 14:04):    No growth at 24 hours      CAPILLARY BLOOD GLUCOSE            RADIOLOGY & ADDITIONAL TESTS:  Results Reviewed:   Imaging Personally Reviewed:  Electrocardiogram Personally Reviewed:                                           [facility] Division of Hospital Medicine      Hospital course: 82y M with PMH HTN, CAD s/p stents, renal cell carcinoma status post left nephrectomy, bladder CA, BPH, CHF, LBBB, vertigo,  HLD, 5.5cm AAA without rupture post EVAR, paroxysmal A-fib on Eliquis, Plavix, and aspirin, early stage Alzheimer dementia transferred from [facility] s/p unwitnessed fall with head strike at home found by wife on floor at 8am. Patient brought to urgent care by wife and had 5 staples to posterior scalp but was instructed to go to nearest ED.  CT head at [place] showed R frontal IPH, SDH, SAH at 9:00. CTA stroke protocol not performed at [facility]. Patient BIB on 6L NC.  Pt GCS 15 on arrival, A&Ox2 on arrival. After initial assessment, patient noted to be vomiting enroute to CT scanner.   admitted 11/10 with prolonged hospitalization here.  being found to have right frontal IPH, 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  Extubated 11/13. Course complicated by Acute Kidney Injury, afib, EEG with epileptiform discharges. Patient was trasnferred to step down unit 11/23. TTE had shown possible vegetation on valve but repeat was not diagnostic and blood cultures were negative. Course further complicated by deep vein thrombosis or LUE brachial veins. Patient course further complicated by stroke found on MRI, WHIT recommended but family declined. He subsequently underwent cranioplasty on 12/7, drain removed 12/10. He had worsening of brain bleed 12/15, taken off anticoagulation and reversed. Patient then had acute decompensation on 12/21 for worsening mental status, vomiting, aspiration, and shock, requiring transfer to Intensive care unit for intubation, and pressors. Further complications including acute blood loss anemia requiring PRBCs, klebsiella pneumonia afib with RVR. He is now s/p extubation 12/25.         Chart reviewed   Patient seen and examined lying inbed, no respiratory distress, nurse present at the bedside and patient's girl friend   overnight: no new events per nurse. the patient is still having loose stools      SUBJECTIVE  . He  denies  head ache no dizziness. chest pain,  no shortness of breath, no nausea, no vomiting, no abdominal pain. The patient has no other complaints.    MEDICATIONS  (STANDING):  atorvastatin 80 milliGRAM(s) Oral at bedtime  caspofungin IVPB      caspofungin IVPB 50 milliGRAM(s) IV Intermittent every 24 hours  chlorhexidine 2% Cloths 1 Application(s) Topical <User Schedule>  dextrose 5%. 1000 milliLiter(s) (83 mL/Hr) IV Continuous <Continuous>  doxazosin 2 milliGRAM(s) Oral at bedtime  folic acid 1 milliGRAM(s) Oral daily  lacosamide IVPB 150 milliGRAM(s) IV Intermittent every 12 hours  lactated ringers Bolus 500 milliLiter(s) IV Bolus once  meropenem Injectable 1000 milliGRAM(s) IV Push every 8 hours  metoprolol tartrate Injectable 5 milliGRAM(s) IV Push every 6 hours  Nephro-roma 1 Tablet(s) Oral daily  pantoprazole  Injectable 40 milliGRAM(s) IV Push two times a day  vancomycin    Solution 125 milliGRAM(s) Oral every 6 hours    MEDICATIONS  (PRN):  acetaminophen     Tablet .. 650 milliGRAM(s) Oral every 6 hours PRN Temp greater or equal to 38C (100.4F), Mild Pain (1 - 3)  artificial tears (preservative free) Ophthalmic Solution 1 Drop(s) Both EYES every 6 hours PRN Dry Eyes        I&O's Summary    I&O's Detail    29 Dec 2023 07:01  -  30 Dec 2023 07:00  --------------------------------------------------------  IN:    dextrose 5%: 498 mL    IV PiggyBack: 260 mL    IV PiggyBack: 50 mL    Lactated Ringers w/ Additives: 225 mL  Total IN: 1033 mL    OUT:    Indwelling Catheter - Urethral (mL): 2425 mL  Total OUT: 2425 mL    Total NET: -1392 mL      30 Dec 2023 07:01  -  30 Dec 2023 13:21  --------------------------------------------------------  IN:  Total IN: 0 mL    OUT:    Indwelling Catheter - Urethral (mL): 20 mL  Total OUT: 20 mL    Total NET: -20 mL            PHYSICAL EXAM:  ICU Vital Signs Last 24 Hrs  T(C): 36.3 (30 Dec 2023 12:12), Max: 37.2 (30 Dec 2023 07:44)  T(F): 97.4 (30 Dec 2023 12:12), Max: 98.9 (30 Dec 2023 07:44)  HR: 103 (30 Dec 2023 06:00) (85 - 107)  BP: 139/104 (30 Dec 2023 06:00) (87/75 - 139/104)  BP(mean): 116 (30 Dec 2023 06:00) (80 - 116)  ABP: --  ABP(mean): --  RR: 19 (30 Dec 2023 06:00) (18 - 19)  SpO2: 95% (30 Dec 2023 06:00) (94% - 96%)    O2 Parameters below as of 30 Dec 2023 06:00  Patient On (Oxygen Delivery Method): room air                    CONSTITUTIONAL: no respiratory distress.  HENMT: Scar on the right side of his head. not pale, anicteric ,moist oral mucosa,  RESPIRATORY: vesicular breath sounds,  no wheezing, no crepitations,  CARDIOVASCULAR: Regular rate and rhythm, normal S1 and S2, no murmur/rub/gallop;   ABDOMEN: full soft ,nontender to palpation, normoactive bowel sounds, no rebound/guarding; No hepatosplenomegaly rectal tube with water diarrhea  MUSCLOSKELETAL: no clubbing or cyanosis of digits left arm in a splint and left leg in a splints  PSYCH:  alert , not oriented ot person , place, no time  NEUROLOGY: alert. Patient does not follow commands most of the time.  SKIN: No rashes; no palpable lesions    LABS:           CBC:            9.7    12.83 )-----------( 260      ( 12-30-23 @ 10:53 )             29.0         Chem:         ( 12-30-23 @ 10:53 )    136  |  104  |  18.7  ----------------------------<  138<H>  3.2<L>   |  24.0  |  0.67        Liver Functions: ( 12-29-23 @ 04:55 )  Alb: 2.1 g/dL / Pro: 5.2 g/dL / ALK PHOS: 81 U/L / ALT: 15 U/L / AST: 24 U/L / GGT: x              Type & Screen:               Urinalysis Basic - ( 29 Dec 2023 04:55 )    Color: x / Appearance: x / SG: x / pH: x  Gluc: 133 mg/dL / Ketone: x  / Bili: x / Urobili: x   Blood: x / Protein: x / Nitrite: x   Leuk Esterase: x / RBC: x / WBC x   Sq Epi: x / Non Sq Epi: x / Bacteria: x        Culture - Blood (collected 28 Dec 2023 05:22)  Source: .Blood Blood-Peripheral  Preliminary Report (29 Dec 2023 14:04):    No growth at 24 hours    Culture - Blood (collected 28 Dec 2023 05:22)  Source: .Blood Blood-Peripheral  Preliminary Report (29 Dec 2023 14:04):    No growth at 24 hours      CAPILLARY BLOOD GLUCOSE            RADIOLOGY & ADDITIONAL TESTS:  Results Reviewed:   Imaging Personally Reviewed:  Electrocardiogram Personally Reviewed:

## 2023-12-30 NOTE — PROGRESS NOTE ADULT - PROBLEM SELECTOR PROBLEM 1
Paroxysmal atrial fibrillation
Metabolic encephalopathy
Paroxysmal atrial fibrillation
Paroxysmal atrial fibrillation
Metabolic encephalopathy

## 2023-12-30 NOTE — PROGRESS NOTE ADULT - ASSESSMENT
82y M with PMH HTN, CAD s/p stents, renal cell carcinoma status post left nephrectomy, bladder CA, BPH, CHF, LBBB, vertigo,  HLD, 5.5cm AAA without rupture post EVAR, paroxysmal A-fib on Eliquis, Plavix, and aspirin, early stage Alzheimer dementia transferred from [facility] s/p unwitnessed fall with head strike at home found by wife on floor at 8am. Patient brought to urgent care by wife and had 5 staples to posterior scalp but was instructed to go to nearest ED.  CT head at [place] [facility] showed R frontal IPH, SDH, SAH at 9:00. CTA stroke protocol not performed at [facility]. Patient BIB on 6L NC.  Pt GCS 15 on arrival, A&Ox2 on arrival. After initial assessment, patient noted to be vomiting enroute to CT scanner.   admitted 11/10 with prolonged hospitalization here.  being found to have right frontal IPH, 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  Extubated 11/13. Course complicated by Acute Kidney Injury, afib, EEG with epileptiform discharges. Patient was trasnferred to step down unit 11/23. TTE had shown possible vegetation on valve but repeat was not diagnostic and blood cultures were negative. Course further complicated by deep vein thrombosis or LUE brachial veins. Patient course further complicated by stroke found on MRI, WHIT recommended but family declined. He subsequently underwent cranioplasty on 12/7, drain removed 12/10. He had worsening of brain bleed 12/15, taken off anticoagulation and reversed. Patient then had acute decompensation on 12/21 for worsening mental status, vomiting, aspiration, and shock, requiring transfer to Intensive care unit for intubation, and pressors. Further complications including acute blood loss anemia requiring PRBCs, klebsiella pneumonia afib with RVR. He is now s/p extubation 12/25.        # Encephalopathy, multifactorial. ICH, CVA, ICU, possible delirium  Waxing, waning  - frequent reorientation  - Neurology follow up    # acute respiratory failure  Currently normoxic on room air    # aspiration pneumonia  Improving Leukocytosis, though febrile  - Aspiration precautions  - Monitor for hypoxia  - s/p Meropenem  - ID follow up      # C diff   - c/w  vancomycin 125mg Q 6    # Dysphagia   puree with no liquids  - SLP evaluation      # fungemia: 9 candida galbrata)  - c/w caspofungin  - Id consult appreciated.    # Atrial Fibrillation  -  change  iv Metoprolol to metorpolol 25mg Po Q 6 hours.   - No anticoagulation given ICH    # ICH s/p craniotomy and cranioplasty  abnormal EEG with risk of seizures  -  Levetiracetam changed to Lacosamide by Neurology after discussion with Neurology; family refusing AED  At risk for seizure and decompensation    # anemia: likely multifactorial    s/p PRBC    c/w PPI,     no intervention per GI.    # hypokalemia:    Patient's potassium was low 3.4  will give kcl 40 MEQ X 1  - recheck potassium level in am      Full Code    Palliative involved,    82y M with PMH HTN, CAD s/p stents, renal cell carcinoma status post left nephrectomy, bladder CA, BPH, CHF, LBBB, vertigo,  HLD, 5.5cm AAA without rupture post EVAR, paroxysmal A-fib on Eliquis, Plavix, and aspirin, early stage Alzheimer dementia transferred from [facility] s/p unwitnessed fall with head strike at home found by wife on floor at 8am. Patient brought to urgent care by wife and had 5 staples to posterior scalp but was instructed to go to nearest ED.  CT head at [place] showed R frontal IPH, SDH, SAH at 9:00. CTA stroke protocol not performed at [facility]. Patient BIB on 6L NC.  Pt GCS 15 on arrival, A&Ox2 on arrival. After initial assessment, patient noted to be vomiting enroute to CT scanner.   admitted 11/10 with prolonged hospitalization here.  being found to have right frontal IPH, 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  Extubated 11/13. Course complicated by Acute Kidney Injury, afib, EEG with epileptiform discharges. Patient was trasnferred to step down unit 11/23. TTE had shown possible vegetation on valve but repeat was not diagnostic and blood cultures were negative. Course further complicated by deep vein thrombosis or LUE brachial veins. Patient course further complicated by stroke found on MRI, WHIT recommended but family declined. He subsequently underwent cranioplasty on 12/7, drain removed 12/10. He had worsening of brain bleed 12/15, taken off anticoagulation and reversed. Patient then had acute decompensation on 12/21 for worsening mental status, vomiting, aspiration, and shock, requiring transfer to Intensive care unit for intubation, and pressors. Further complications including acute blood loss anemia requiring PRBCs, klebsiella pneumonia afib with RVR. He is now s/p extubation 12/25.        # Encephalopathy, multifactorial. ICH, CVA, ICU, possible delirium  Waxing, waning  - frequent reorientation  - Neurology follow up    # acute respiratory failure  Currently normoxic on room air    # aspiration pneumonia  Improving Leukocytosis, though febrile  - Aspiration precautions  - Monitor for hypoxia  - s/p Meropenem  - ID follow up      # C diff   - c/w  vancomycin 125mg Q 6    # Dysphagia   puree with no liquids  - SLP evaluation      # fungemia: 9 candida galbrata)  - c/w caspofungin  - Id consult appreciated.    # Atrial Fibrillation  -  change  iv Metoprolol to metorpolol 25mg Po Q 6 hours.   - No anticoagulation given ICH    # ICH s/p craniotomy and cranioplasty  abnormal EEG with risk of seizures  -  Levetiracetam changed to Lacosamide by Neurology after discussion with Neurology; family refusing AED  At risk for seizure and decompensation    # anemia: likely multifactorial    s/p PRBC    c/w PPI,     no intervention per GI.    # hypokalemia:    Patient's potassium was low 3.4  will give kcl 40 MEQ X 1  - recheck potassium level in am      Full Code    Palliative involved,

## 2023-12-30 NOTE — PROGRESS NOTE ADULT - SUBJECTIVE AND OBJECTIVE BOX
[facility]                                        Neu[facility] at [place]                                 Josh West, & Viral                                  [address]                                        [place], [place], [address]                                             [phone]        CC: intracranial hemorrhage     HPI:   The patient is an 83 year old man admitted 11/10/23 transferred from [facility] after presenting after a fall with head injury.   He was on anticoagulation for atrial fibrillation and CT showed intracranial hemorrhage.   he was transferred here for neurosurgery intervention.   He underwent right sided hemicraniectomy.  He was seen by Dr Moreno with the stroke team on 11/25/23 after brain MRI showed infarct.   In addition to the hemorrhages, the MRI showed tiny acute lacunar infarctions in the left cerebellar hemisphere and right parietal lobe, as well as around the surgical cavity.  The suspicion was that this was on an embolic basis secondary to atrial fibrillation and aortic valve echodensity.    Downgraded to medicine team 12/10, noted to be febrile with unremarkable work-up.  CT head obtained on 12/15 showed new increase in right frontal ICH. Upgraded again to neuro-ICU. Was given Andexxa for reversal. Repeat CT heads were noted as stable and patient was downgraded to medicine on 12/17.     Rapid Response was called on 12/21/23 for respiratory distress requiring intubation. He was transferred to the MICU service.   He was seen again by Dr Moreno 12/22/23 due to abnormal EEG findings of:  - Right anterior slowing and sharp-wave discharges  - Moderate generalized slowing.  - Right hemispheric breach artifact.  These were attributed to the intracranial hemorrhage and surgery and the recommendation was for continuing Keppra which at the time was at 1000 mg twice per day.   He was extubated 12/25/23 and transferred back to the medical service on 12/26/23.  Course has been complicated by aspiration pneumonia and fungemia.   ID service has been involved and patient remains on IV antibiotics and antifungals.   He has remained somnolent since transfer out of ICU.  The patient was seen by the palliative care service on 12/26/23.    Neurology called back today to reevaluate mental status as family attributing the lethargy to the Keppra.   After discussing with patient's daughter, I had switched him to Vimpat secondary to reported agitation on Keppra.   Family now reportedly refusing Vimpat. (JW)    I spoke with his daughter today and she does not want her father ot get any anti-seizure medications currently    Interim history:  Remains on [address] [place].     ROS:   Denies headache or dizziness.  Denies chest pain.  Denies shortness of breath.    MEDICATIONS  (STANDING):  atorvastatin 80 milliGRAM(s) Oral at bedtime  caspofungin IVPB 50 milliGRAM(s) IV Intermittent every 24 hours  caspofungin IVPB      chlorhexidine 2% Cloths 1 Application(s) Topical <User Schedule>  dextrose 5%. 1000 milliLiter(s) (83 mL/Hr) IV Continuous <Continuous>  doxazosin 2 milliGRAM(s) Oral at bedtime  folic acid 1 milliGRAM(s) Oral daily  lacosamide IVPB 150 milliGRAM(s) IV Intermittent every 12 hours  lactated ringers Bolus 500 milliLiter(s) IV Bolus once  metoprolol tartrate Injectable 5 milliGRAM(s) IV Push every 6 hours  Nephro-roma 1 Tablet(s) Oral daily  pantoprazole  Injectable 40 milliGRAM(s) IV Push two times a day  vancomycin    Solution 125 milliGRAM(s) Oral every 6 hours    MEDICATIONS  (PRN):  acetaminophen     Tablet .. 650 milliGRAM(s) Oral every 6 hours PRN Temp greater or equal to 38C (100.4F), Mild Pain (1 - 3)  artificial tears (preservative free) Ophthalmic Solution 1 Drop(s) Both EYES every 6 hours PRN Dry Eyes      Vital Signs Last 24 Hrs  T(C): 36.3 (30 Dec 2023 12:12), Max: 37.2 (30 Dec 2023 07:44)  T(F): 97.4 (30 Dec 2023 12:12), Max: 98.9 (30 Dec 2023 07:44)  HR: 103 (30 Dec 2023 06:00) (85 - 107)  BP: 139/104 (30 Dec 2023 06:00) (87/75 - 139/104)  BP(mean): 116 (30 Dec 2023 06:00) (80 - 116)  RR: 19 (30 Dec 2023 06:00) (18 - 19)  SpO2: 95% (30 Dec 2023 06:00) (94% - 96%)    Parameters below as of 30 Dec 2023 06:00  Patient On (Oxygen Delivery Method): room air      Detailed Neurologic Exam:    Mental status: The patient opens eyes readily to voice. He answers questions although is dysarthric. He follows instructions with right hand.     Cranial nerves: Pupils equal and react symmetrically to light. There is no visual field deficit to threat. Extraocular motion is full with no nystagmus. Facial sensation is intact. Facial musculature is asymmetric with a depression of the left nasolabial fold. Palate elevates symmetrically. Tongue is midline.    Motor: There is normal bulk and tone.  There is no tremor.  Strength grossly 5/5 in right UE. Left UE plegic.   Moves distal right LE slightly to stimuli. No movement of left LE.    Sensation: Decreased grimace to pin on the left.    Reflexes: 1+ throughout and plantar responses are flexor on the right and extensor on the left.    Cerebellar: Cannot be tested.     Labs:                           9.7    12.83 )-----------( 260      ( 30 Dec 2023 10:53 )             29.0     12-30    136  |  104  |  18.7  ----------------------------<  138<H>  3.2<L>   |  24.0  |  0.67    Ca    7.7<L>      30 Dec 2023 10:53  Mg     1.6     12-29    TPro  5.2<L>  /  Alb  2.1<L>  /  TBili  0.6  /  DBili  x   /  AST  24  /  ALT  15  /  AlkPhos  81  12-29    LIVER FUNCTIONS - ( 29 Dec 2023 04:55 )  Alb: 2.1 g/dL / Pro: 5.2 g/dL / ALK PHOS: 81 U/L / ALT: 15 U/L / AST: 24 U/L / GGT: x               _____________________________________________________________  EEG SUMMARY/CLASSIFICATION 12/23/23 (15.5 hours)    Abnormal EEG   - Right anterior slowing and sharp-wave discharges  - Moderate generalized slowing.  - Right hemispheric breach artifact.  _____________________________________________________________  EEG IMPRESSION/CLINICAL CORRELATE    Abnormal EEG study.  Potential epileptogenic focus in the right anterior head region with underlying cerebral dysfunction and evidence for overlying skull defect  Moderate nonspecific diffuse or multifocal cerebral dysfunction.   No seizure seen.  _____________________________________________________________                                   [facility]                                        Neurology at [place]                                 Josh West, & Viral                                  [address]                                        [place], [place], [address]                                             [phone]        CC: intracranial hemorrhage     HPI:   The patient is an 83 year old man admitted 11/10/23 transferred from [facility] after presenting after a fall with head injury.   He was on anticoagulation for atrial fibrillation and CT showed intracranial hemorrhage.   he was transferred here for neurosurgery intervention.   He underwent right sided hemicraniectomy.  He was seen by Dr Moreno with the stroke team on 11/25/23 after brain MRI showed infarct.   In addition to the hemorrhages, the MRI showed tiny acute lacunar infarctions in the left cerebellar hemisphere and right parietal lobe, as well as around the surgical cavity.  The suspicion was that this was on an embolic basis secondary to atrial fibrillation and aortic valve echodensity.    Downgraded to medicine team 12/10, noted to be febrile with unremarkable work-up.  CT head obtained on 12/15 showed new increase in right frontal ICH. Upgraded again to neuro-ICU. Was given Andexxa for reversal. Repeat CT heads were noted as stable and patient was downgraded to medicine on 12/17.     Rapid Response was called on 12/21/23 for respiratory distress requiring intubation. He was transferred to the MICU service.   He was seen again by Dr Moreno 12/22/23 due to abnormal EEG findings of:  - Right anterior slowing and sharp-wave discharges  - Moderate generalized slowing.  - Right hemispheric breach artifact.  These were attributed to the intracranial hemorrhage and surgery and the recommendation was for continuing Keppra which at the time was at 1000 mg twice per day.   He was extubated 12/25/23 and transferred back to the medical service on 12/26/23.  Course has been complicated by aspiration pneumonia and fungemia.   ID service has been involved and patient remains on IV antibiotics and antifungals.   He has remained somnolent since transfer out of ICU.  The patient was seen by the palliative care service on 12/26/23.    Neurology called back today to reevaluate mental status as family attributing the lethargy to the Keppra.   After discussing with patient's daughter, I had switched him to Vimpat secondary to reported agitation on Keppra.   Family now reportedly refusing Vimpat. (JW)    I spoke with his daughter today and she does not want her father ot get any anti-seizure medications currently    Interim history:  Remains on [address] [place].     ROS:   Denies headache or dizziness.  Denies chest pain.  Denies shortness of breath.    MEDICATIONS  (STANDING):  atorvastatin 80 milliGRAM(s) Oral at bedtime  caspofungin IVPB 50 milliGRAM(s) IV Intermittent every 24 hours  caspofungin IVPB      chlorhexidine 2% Cloths 1 Application(s) Topical <User Schedule>  dextrose 5%. 1000 milliLiter(s) (83 mL/Hr) IV Continuous <Continuous>  doxazosin 2 milliGRAM(s) Oral at bedtime  folic acid 1 milliGRAM(s) Oral daily  lacosamide IVPB 150 milliGRAM(s) IV Intermittent every 12 hours  lactated ringers Bolus 500 milliLiter(s) IV Bolus once  metoprolol tartrate Injectable 5 milliGRAM(s) IV Push every 6 hours  Nephro-roma 1 Tablet(s) Oral daily  pantoprazole  Injectable 40 milliGRAM(s) IV Push two times a day  vancomycin    Solution 125 milliGRAM(s) Oral every 6 hours    MEDICATIONS  (PRN):  acetaminophen     Tablet .. 650 milliGRAM(s) Oral every 6 hours PRN Temp greater or equal to 38C (100.4F), Mild Pain (1 - 3)  artificial tears (preservative free) Ophthalmic Solution 1 Drop(s) Both EYES every 6 hours PRN Dry Eyes      Vital Signs Last 24 Hrs  T(C): 36.3 (30 Dec 2023 12:12), Max: 37.2 (30 Dec 2023 07:44)  T(F): 97.4 (30 Dec 2023 12:12), Max: 98.9 (30 Dec 2023 07:44)  HR: 103 (30 Dec 2023 06:00) (85 - 107)  BP: 139/104 (30 Dec 2023 06:00) (87/75 - 139/104)  BP(mean): 116 (30 Dec 2023 06:00) (80 - 116)  RR: 19 (30 Dec 2023 06:00) (18 - 19)  SpO2: 95% (30 Dec 2023 06:00) (94% - 96%)    Parameters below as of 30 Dec 2023 06:00  Patient On (Oxygen Delivery Method): room air      Detailed Neurologic Exam:    Mental status: The patient opens eyes readily to voice. He answers questions although is dysarthric. He follows instructions with right hand.     Cranial nerves: Pupils equal and react symmetrically to light. There is no visual field deficit to threat. Extraocular motion is full with no nystagmus. Facial sensation is intact. Facial musculature is asymmetric with a depression of the left nasolabial fold. Palate elevates symmetrically. Tongue is midline.    Motor: There is normal bulk and tone.  There is no tremor.  Strength grossly 5/5 in right UE. Left UE plegic.   Moves distal right LE slightly to stimuli. No movement of left LE.    Sensation: Decreased grimace to pin on the left.    Reflexes: 1+ throughout and plantar responses are flexor on the right and extensor on the left.    Cerebellar: Cannot be tested.     Labs:                           9.7    12.83 )-----------( 260      ( 30 Dec 2023 10:53 )             29.0     12-30    136  |  104  |  18.7  ----------------------------<  138<H>  3.2<L>   |  24.0  |  0.67    Ca    7.7<L>      30 Dec 2023 10:53  Mg     1.6     12-29    TPro  5.2<L>  /  Alb  2.1<L>  /  TBili  0.6  /  DBili  x   /  AST  24  /  ALT  15  /  AlkPhos  81  12-29    LIVER FUNCTIONS - ( 29 Dec 2023 04:55 )  Alb: 2.1 g/dL / Pro: 5.2 g/dL / ALK PHOS: 81 U/L / ALT: 15 U/L / AST: 24 U/L / GGT: x               _____________________________________________________________  EEG SUMMARY/CLASSIFICATION 12/23/23 (15.5 hours)    Abnormal EEG   - Right anterior slowing and sharp-wave discharges  - Moderate generalized slowing.  - Right hemispheric breach artifact.  _____________________________________________________________  EEG IMPRESSION/CLINICAL CORRELATE    Abnormal EEG study.  Potential epileptogenic focus in the right anterior head region with underlying cerebral dysfunction and evidence for overlying skull defect  Moderate nonspecific diffuse or multifocal cerebral dysfunction.   No seizure seen.  _____________________________________________________________

## 2023-12-30 NOTE — PROGRESS NOTE ADULT - ASSESSMENT
83y M with PMH HTN, CAD s/p PCI, renal cell carcinoma status post left nephrectomy, bladder CA, BPH, CHF, LBBB, vertigo,  HLD, 5.5cm AAA s/p post EVAR, paroxysmal afib on Eliquis, Plavix, and Aspirin, h/o HIT, early stage Alzheimer's dementia transferred from [place] 11/10/23 s/p fall found with multicompartmental ICH, s/p R craniectomy 11/10 and eventual cranioplasty 12/7/23. Hospital course complicated by afib with RVR, Klebsiella pneumonia, LUE DVT, multiple subsequent episodes of ICH after attempting to restart Eliquis, now with acute respiratory failure, aspiration pneumonia, shock, hematemesis.  - Extubated 12/25  - Neuro exam stable    PLAN:  - Q4 neuro checks  - Continue Keppra 1g BID  - Monitor incision   - Continue holding Eliquis- unsure when and if patient would be a safe candidate to resume with multiple episodes of new ICH after attempting to resume  - Supportive care/further medical management per MICU 83y M with PMH HTN, CAD s/p PCI, renal cell carcinoma status post left nephrectomy, bladder CA, BPH, CHF, LBBB, vertigo,  HLD, 5.5cm AAA s/p post EVAR, paroxysmal afib on Eliquis, Plavix, and Aspirin, h/o HIT, early stage Alzheimer's dementia transferred from [place] 11/10/23 s/p fall found with multicompartmental ICH, s/p R craniectomy 11/10 and eventual cranioplasty 12/7/23. Hospital course complicated by afib with RVR, Klebsiella pneumonia, LUE DVT, multiple subsequent episodes of ICH after attempting to restart Eliquis, now with acute respiratory failure, aspiration pneumonia, shock, hematemesis.  - Extubated 12/25  - Neuro exam stable    PLAN:  - Q4 neuro checks  - Continue Keppra 1g BID  - Monitor incision   - Continue holding Eliquis- unsure when and if patient would be a safe candidate to resume with multiple episodes of new ICH after attempting to resume  - Supportive care/further medical management per MICU 83y M with PMH HTN, CAD s/p PCI, renal cell carcinoma status post left nephrectomy, bladder CA, BPH, CHF, LBBB, vertigo,  HLD, 5.5cm AAA s/p post EVAR, paroxysmal afib on Eliquis, Plavix, and Aspirin, h/o HIT, early stage Alzheimer's dementia transferred from [place] 11/10/23 s/p fall found with multicompartmental ICH, s/p R craniectomy 11/10 and eventual cranioplasty 12/7/23. Hospital course complicated by afib with RVR, Klebsiella pneumonia, LUE DVT, multiple subsequent episodes of ICH after attempting to restart Eliquis, now with acute respiratory failure, aspiration pneumonia, shock, hematemesis.  - Extubated 12/25  - Neuro exam stable    PLAN:  - Q4 neuro checks  - AED per neurology  - Monitor incision   - Continue holding Eliquis- unsure when and if patient would be a safe candidate to resume with multiple episodes of new ICH after attempting to resume  - Supportive care/further medical management per primary team 83y M with PMH HTN, CAD s/p PCI, renal cell carcinoma status post left nephrectomy, bladder CA, BPH, CHF, LBBB, vertigo,  HLD, 5.5cm AAA s/p post EVAR, paroxysmal afib on Eliquis, Plavix, and Aspirin, h/o HIT, early stage Alzheimer's dementia transferred from [place] 11/10/23 s/p fall found with multicompartmental ICH, s/p R craniectomy 11/10 and eventual cranioplasty 12/7/23. Hospital course complicated by afib with RVR, Klebsiella pneumonia, LUE DVT, multiple subsequent episodes of ICH after attempting to restart Eliquis, now with acute respiratory failure, aspiration pneumonia, shock, hematemesis.  - Extubated 12/25  - Neuro exam stable    PLAN:  - Q4 neuro checks  - AED per neurology  - Monitor incision   - Continue holding Eliquis- unsure when and if patient would be a safe candidate to resume with multiple episodes of new ICH after attempting to resume  - Supportive care/further medical management per primary team

## 2023-12-30 NOTE — PROGRESS NOTE ADULT - SUBJECTIVE AND OBJECTIVE BOX
HPI:  82y M with PMH HTN, CAD s/p stents, renal cell carcinoma status post left nephrectomy, bladder CA, BPH, CHF, LBBB, vertigo,  HLD, 5.5cm AAA without rupture post EVAR, paroxysmal A-fib on Eliquis, Plavix, and aspirin, early stage Alzheimer dementia transferred from [facility] s/p unwitnessed fall with head strike at home found by wife on floor at 8am. Patient brought to urgent care by wife and had 5 staples to posterior scalp but was instructed to go to nearest ED.  CT head at [place] showed R frontal IPH, SDH, SAH at 9:00. CTA stroke protocol not performed at [facility]. Patient BIB on 6L NC.  Pt GCS 15 on arrival, A&Ox2 on arrival. After initial assessment, patient noted to be vomiting enroute to CT scanner.   (10 Nov 2023 11:04)    12/25: Extubated   12/26: DG to SDU     INTERVAL HPI/OVERNIGHT EVENTS:  Pt seen and evaluated while pt in bed. Neuro exam stable.       Vital Signs Last 24 Hrs  T(C): 37.2 (30 Dec 2023 16:00), Max: 37.2 (30 Dec 2023 07:44)  T(F): 98.9 (30 Dec 2023 16:00), Max: 98.9 (30 Dec 2023 07:44)  HR: 103 (30 Dec 2023 06:00) (99 - 107)  BP: 139/104 (30 Dec 2023 06:00) (103/79 - 139/104)  BP(mean): 116 (30 Dec 2023 06:00) (81 - 116)  RR: 19 (30 Dec 2023 06:00) (18 - 19)  SpO2: 95% (30 Dec 2023 06:00) (94% - 96%)    Parameters below as of 30 Dec 2023 06:00  Patient On (Oxygen Delivery Method): room air      PHYSICAL EXAM:  GENERAL: NAD  HEAD: s/p R cranioplasty. Incision clean and dry without drainage  JOEY COMA SCORE: E-4 V-4 (person only) M-6 =14  MENTAL STATUS: Eyes open sponantously. Oriented to self. Follows commands on right  CRANIAL NERVES: PERRL. EOMI. Face grossly symmetric. Hearing appears intact. Speech clear  MOTOR: RUE able to bend at elbow and show two fingers. RLE wiggles toes to command, HF at least 2-3/5. LUE trace movement to peripheral noxious; LLE withdraws to noxious  SENSATION: as above to noxious      LABS:                        9.7    12.83 )-----------( 260      ( 30 Dec 2023 10:53 )             29.0     12-30    136  |  104  |  18.7  ----------------------------<  138<H>  3.2<L>   |  24.0  |  0.67    Ca    7.7<L>      30 Dec 2023 10:53  Mg     1.6     12-29    TPro  5.2<L>  /  Alb  2.1<L>  /  TBili  0.6  /  DBili  x   /  AST  24  /  ALT  15  /  AlkPhos  81  12-29      Urinalysis Basic - ( 30 Dec 2023 10:53 )    Color: x / Appearance: x / SG: x / pH: x  Gluc: 138 mg/dL / Ketone: x  / Bili: x / Urobili: x   Blood: x / Protein: x / Nitrite: x   Leuk Esterase: x / RBC: x / WBC x   Sq Epi: x / Non Sq Epi: x / Bacteria: x        12-29 @ 07:01 - 12-30 @ 07:00  --------------------------------------------------------  IN: 1033 mL / OUT: 2425 mL / NET: -1392 mL    12-30 @ 07:01 - 12-30 @ 17:31  --------------------------------------------------------  IN: 0 mL / OUT: 20 mL / NET: -20 mL     HPI:  82y M with PMH HTN, CAD s/p stents, renal cell carcinoma status post left nephrectomy, bladder CA, BPH, CHF, LBBB, vertigo,  HLD, 5.5cm AAA without rupture post EVAR, paroxysmal A-fib on Eliquis, Plavix, and aspirin, early stage Alzheimer dementia transferred from [facility] s/p unwitnessed fall with head strike at home found by wife on floor at 8am. Patient brought to urgent care by wife and had 5 staples to posterior scalp but was instructed to go to nearest ED.  CT head at [place] showed R frontal IPH, SDH, SAH at 9:00. CTA stroke protocol not performed at [facility]. Patient BIB on 6L NC.  Pt GCS 15 on arrival, A&Ox2 on arrival. After initial assessment, patient noted to be vomiting enroute to CT scanner.   (10 Nov 2023 11:04)    12/25: Extubated   12/26: DG to SDU     INTERVAL HPI/OVERNIGHT EVENTS:  Pt seen and evaluated while pt in bed. Neuro exam stable.       Vital Signs Last 24 Hrs  T(C): 37.2 (30 Dec 2023 16:00), Max: 37.2 (30 Dec 2023 07:44)  T(F): 98.9 (30 Dec 2023 16:00), Max: 98.9 (30 Dec 2023 07:44)  HR: 103 (30 Dec 2023 06:00) (99 - 107)  BP: 139/104 (30 Dec 2023 06:00) (103/79 - 139/104)  BP(mean): 116 (30 Dec 2023 06:00) (81 - 116)  RR: 19 (30 Dec 2023 06:00) (18 - 19)  SpO2: 95% (30 Dec 2023 06:00) (94% - 96%)    Parameters below as of 30 Dec 2023 06:00  Patient On (Oxygen Delivery Method): room air      PHYSICAL EXAM:  GENERAL: NAD  HEAD: s/p R cranioplasty. Incision clean and dry without drainage  JOEY COMA SCORE: E-4 V-4 (person only) M-6 =14  MENTAL STATUS: Eyes open sponantously. Oriented to self. Follows commands on right  CRANIAL NERVES: PERRL. EOMI. Face grossly symmetric. Hearing appears intact. Speech clear  MOTOR: RUE able to bend at elbow and show two fingers. RLE wiggles toes to command, HF at least 2-3/5. LUE trace movement to peripheral noxious; LLE withdraws to noxious  SENSATION: as above to noxious      LABS:                        9.7    12.83 )-----------( 260      ( 30 Dec 2023 10:53 )             29.0     12-30    136  |  104  |  18.7  ----------------------------<  138<H>  3.2<L>   |  24.0  |  0.67    Ca    7.7<L>      30 Dec 2023 10:53  Mg     1.6     12-29    TPro  5.2<L>  /  Alb  2.1<L>  /  TBili  0.6  /  DBili  x   /  AST  24  /  ALT  15  /  AlkPhos  81  12-29      Urinalysis Basic - ( 30 Dec 2023 10:53 )    Color: x / Appearance: x / SG: x / pH: x  Gluc: 138 mg/dL / Ketone: x  / Bili: x / Urobili: x   Blood: x / Protein: x / Nitrite: x   Leuk Esterase: x / RBC: x / WBC x   Sq Epi: x / Non Sq Epi: x / Bacteria: x        12-29 @ 07:01 - 12-30 @ 07:00  --------------------------------------------------------  IN: 1033 mL / OUT: 2425 mL / NET: -1392 mL    12-30 @ 07:01 - 12-30 @ 17:31  --------------------------------------------------------  IN: 0 mL / OUT: 20 mL / NET: -20 mL     HPI:  82y M with PMH HTN, CAD s/p stents, renal cell carcinoma status post left nephrectomy, bladder CA, BPH, CHF, LBBB, vertigo,  HLD, 5.5cm AAA without rupture post EVAR, paroxysmal A-fib on Eliquis, Plavix, and aspirin, early stage Alzheimer dementia transferred from [facility] s/p unwitnessed fall with head strike at home found by wife on floor at 8am. Patient brought to urgent care by wife and had 5 staples to posterior scalp but was instructed to go to nearest ED.  CT head at [place] showed R frontal IPH, SDH, SAH at 9:00. CTA stroke protocol not performed at [facility]. Patient BIB on 6L NC.  Pt GCS 15 on arrival, A&Ox2 on arrival. After initial assessment, patient noted to be vomiting enroute to CT scanner.   (10 Nov 2023 11:04)    12/25: Extubated   12/26: DG to SDU     INTERVAL HPI/OVERNIGHT EVENTS:  Pt seen and evaluated while pt in bed. Neuro exam stable.       Vital Signs Last 24 Hrs  T(C): 37.2 (30 Dec 2023 16:00), Max: 37.2 (30 Dec 2023 07:44)  T(F): 98.9 (30 Dec 2023 16:00), Max: 98.9 (30 Dec 2023 07:44)  HR: 103 (30 Dec 2023 06:00) (99 - 107)  BP: 139/104 (30 Dec 2023 06:00) (103/79 - 139/104)  BP(mean): 116 (30 Dec 2023 06:00) (81 - 116)  RR: 19 (30 Dec 2023 06:00) (18 - 19)  SpO2: 95% (30 Dec 2023 06:00) (94% - 96%)    Parameters below as of 30 Dec 2023 06:00  Patient On (Oxygen Delivery Method): room air      PHYSICAL EXAM:  GENERAL: NAD  HEAD: s/p R cranioplasty. Incision clean and dry without drainage  JOEY COMA SCORE: E-4 V-4 (person only) M-6 =14  Neuro: Eyes open sponantously. Oriented to self. Follows commands on right  CRANIAL NERVES: PERRL. EOMI. Face grossly symmetric. Hearing appears intact. Speech clear  SENSATION: as above to noxious      LABS:                        9.7    12.83 )-----------( 260      ( 30 Dec 2023 10:53 )             29.0     12-30    136  |  104  |  18.7  ----------------------------<  138<H>  3.2<L>   |  24.0  |  0.67    Ca    7.7<L>      30 Dec 2023 10:53  Mg     1.6     12-29    TPro  5.2<L>  /  Alb  2.1<L>  /  TBili  0.6  /  DBili  x   /  AST  24  /  ALT  15  /  AlkPhos  81  12-29      Urinalysis Basic - ( 30 Dec 2023 10:53 )    Color: x / Appearance: x / SG: x / pH: x  Gluc: 138 mg/dL / Ketone: x  / Bili: x / Urobili: x   Blood: x / Protein: x / Nitrite: x   Leuk Esterase: x / RBC: x / WBC x   Sq Epi: x / Non Sq Epi: x / Bacteria: x        12-29 @ 07:01 - 12-30 @ 07:00  --------------------------------------------------------  IN: 1033 mL / OUT: 2425 mL / NET: -1392 mL    12-30 @ 07:01 - 12-30 @ 17:31  --------------------------------------------------------  IN: 0 mL / OUT: 20 mL / NET: -20 mL     HPI:  82y M with PMH HTN, CAD s/p stents, renal cell carcinoma status post left nephrectomy, bladder CA, BPH, CHF, LBBB, vertigo,  HLD, 5.5cm AAA without rupture post EVAR, paroxysmal A-fib on Eliquis, Plavix, and aspirin, early stage Alzheimer dementia transferred from [facility] s/p unwitnessed fall with head strike at home found by wife on floor at 8am. Patient brought to urgent care by wife and had 5 staples to posterior scalp but was instructed to go to nearest ED.  CT head at [place] showed R frontal IPH, SDH, SAH at 9:00. CTA stroke protocol not performed at [facility]. Patient BIB on 6L NC.  Pt GCS 15 on arrival, A&Ox2 on arrival. After initial assessment, patient noted to be vomiting enroute to CT scanner.   (10 Nov 2023 11:04)    12/25: Extubated   12/26: DG to SDU     INTERVAL HPI/OVERNIGHT EVENTS:  Pt seen and evaluated while pt in bed. Neuro exam stable.       Vital Signs Last 24 Hrs  T(C): 37.2 (30 Dec 2023 16:00), Max: 37.2 (30 Dec 2023 07:44)  T(F): 98.9 (30 Dec 2023 16:00), Max: 98.9 (30 Dec 2023 07:44)  HR: 103 (30 Dec 2023 06:00) (99 - 107)  BP: 139/104 (30 Dec 2023 06:00) (103/79 - 139/104)  BP(mean): 116 (30 Dec 2023 06:00) (81 - 116)  RR: 19 (30 Dec 2023 06:00) (18 - 19)  SpO2: 95% (30 Dec 2023 06:00) (94% - 96%)    Parameters below as of 30 Dec 2023 06:00  Patient On (Oxygen Delivery Method): room air      PHYSICAL EXAM:  GENERAL: NAD  HEAD: s/p R cranioplasty. Incision clean and dry without drainage  JOEY COMA SCORE: E-4 V-4 (person only) M-6 =14  Neuro: Eyes open sponantously. Oriented to self. Follows commands on right  CRANIAL NERVES: PERRL. EOMI. Face grossly symmetric. Hearing appears intact. Speech clear  SENSATION: as above to noxious      LABS:                        9.7    12.83 )-----------( 260      ( 30 Dec 2023 10:53 )             29.0     12-30    136  |  104  |  18.7  ----------------------------<  138<H>  3.2<L>   |  24.0  |  0.67    Ca    7.7<L>      30 Dec 2023 10:53  Mg     1.6     12-29    TPro  5.2<L>  /  Alb  2.1<L>  /  TBili  0.6  /  DBili  x   /  AST  24  /  ALT  15  /  AlkPhos  81  12-29      Urinalysis Basic - ( 30 Dec 2023 10:53 )    Color: x / Appearance: x / SG: x / pH: x  Gluc: 138 mg/dL / Ketone: x  / Bili: x / Urobili: x   Blood: x / Protein: x / Nitrite: x   Leuk Esterase: x / RBC: x / WBC x   Sq Epi: x / Non Sq Epi: x / Bacteria: x        12-29 @ 07:01 - 12-30 @ 07:00  --------------------------------------------------------  IN: 1033 mL / OUT: 2425 mL / NET: -1392 mL    12-30 @ 07:01 - 12-30 @ 17:31  --------------------------------------------------------  IN: 0 mL / OUT: 20 mL / NET: -20 mL

## 2023-12-30 NOTE — PROGRESS NOTE ADULT - PROBLEM SELECTOR PROBLEM 3
Dysphagia
R/O Endocarditis
CAD (coronary artery disease)
Dysphagia
Stroke
+ nasal congestion; Mouth/Throat: no oral lesions; Neck:  no stiffness and no swollen glands.
Dysphagia
Dysphagia

## 2023-12-31 LAB
GLUCOSE BLDC GLUCOMTR-MCNC: 114 MG/DL — HIGH (ref 70–99)
GLUCOSE BLDC GLUCOMTR-MCNC: 114 MG/DL — HIGH (ref 70–99)

## 2023-12-31 PROCEDURE — 99233 SBSQ HOSP IP/OBS HIGH 50: CPT

## 2023-12-31 RX ORDER — SODIUM CHLORIDE 9 MG/ML
1000 INJECTION, SOLUTION INTRAVENOUS
Refills: 0 | Status: COMPLETED | OUTPATIENT
Start: 2023-12-31 | End: 2024-01-01

## 2023-12-31 RX ADMIN — CASPOFUNGIN ACETATE 260 MILLIGRAM(S): 7 INJECTION, POWDER, LYOPHILIZED, FOR SOLUTION INTRAVENOUS at 12:37

## 2023-12-31 RX ADMIN — Medication 1 TABLET(S): at 12:37

## 2023-12-31 RX ADMIN — SODIUM CHLORIDE 83 MILLILITER(S): 9 INJECTION, SOLUTION INTRAVENOUS at 15:30

## 2023-12-31 RX ADMIN — PANTOPRAZOLE SODIUM 40 MILLIGRAM(S): 20 TABLET, DELAYED RELEASE ORAL at 05:45

## 2023-12-31 RX ADMIN — ATORVASTATIN CALCIUM 80 MILLIGRAM(S): 80 TABLET, FILM COATED ORAL at 23:59

## 2023-12-31 RX ADMIN — Medication 25 MILLIGRAM(S): at 17:52

## 2023-12-31 RX ADMIN — Medication 125 MILLIGRAM(S): at 17:52

## 2023-12-31 RX ADMIN — Medication 125 MILLIGRAM(S): at 12:37

## 2023-12-31 RX ADMIN — Medication 1 MILLIGRAM(S): at 12:37

## 2023-12-31 RX ADMIN — Medication 25 MILLIGRAM(S): at 05:45

## 2023-12-31 RX ADMIN — CHLORHEXIDINE GLUCONATE 1 APPLICATION(S): 213 SOLUTION TOPICAL at 05:52

## 2023-12-31 RX ADMIN — Medication 125 MILLIGRAM(S): at 05:45

## 2023-12-31 RX ADMIN — PANTOPRAZOLE SODIUM 40 MILLIGRAM(S): 20 TABLET, DELAYED RELEASE ORAL at 17:52

## 2023-12-31 NOTE — PROGRESS NOTE ADULT - SUBJECTIVE AND OBJECTIVE BOX
DEUCE BALL    [id number]    83y      Male    INTERVAL HPI/OVERNIGHT EVENTS:  patient being seen for multi medical issues. Patient seen at bedside with wife and is in nad, tired appearing      REVIEW OF SYSTEMS:    unable to obtain secondary to mentals tatus       Vital Signs Last 24 Hrs  T(C): 36.6 (31 Dec 2023 12:00), Max: 37.3 (30 Dec 2023 19:14)  T(F): 97.8 (31 Dec 2023 12:00), Max: 99.1 (30 Dec 2023 19:14)  HR: 90 (31 Dec 2023 12:00) (81 - 99)  BP: 95/38 (31 Dec 2023 12:00) (94/63 - 114/69)  BP(mean): 56 (31 Dec 2023 12:00) (56 - 88)  RR: 12 (31 Dec 2023 12:00) (12 - 21)  SpO2: 96% (31 Dec 2023 12:00) (93% - 97%)    Parameters below as of 31 Dec 2023 12:00  Patient On (Oxygen Delivery Method): room air        PHYSICAL EXAM:  CONSTITUTIONAL: no respiratory distress.  HENMT: Scar on the right side of his head. not pale, anicteric ,moist oral mucosa,  RESPIRATORY: ctabl no wheezing, no crepitations,  CARDIOVASCULAR: Regular rate and rhythm, normal S1 and S2, no murmur/rub/gallop;   ABDOMEN: full soft ,nontender to palpation, normoactive bowel sounds, no rebound/guarding; No hepatosplenomegaly rectal tube with water diarrhea  MUSCLOSKELETAL: no clubbing or cyanosis of digits left arm in a splint and left leg in a splints  PSYCH:  alert , not oriented ot person , place, no time  NEUROLOGY: awake Patient does not follow commands most of the time.  SKIN: No rashes; no palpable lesions      LABS:                        9.7    12.83 )-----------( 260      ( 30 Dec 2023 10:53 )             29.0     12-30    136  |  104  |  18.7  ----------------------------<  138<H>  3.2<L>   |  24.0  |  0.67    Ca    7.7<L>      30 Dec 2023 10:53        Urinalysis Basic - ( 30 Dec 2023 10:53 )    Color: x / Appearance: x / SG: x / pH: x  Gluc: 138 mg/dL / Ketone: x  / Bili: x / Urobili: x   Blood: x / Protein: x / Nitrite: x   Leuk Esterase: x / RBC: x / WBC x   Sq Epi: x / Non Sq Epi: x / Bacteria: x          MEDICATIONS  (STANDING):  atorvastatin 80 milliGRAM(s) Oral at bedtime  caspofungin IVPB 50 milliGRAM(s) IV Intermittent every 24 hours  caspofungin IVPB      chlorhexidine 2% Cloths 1 Application(s) Topical <User Schedule>  dextrose 5%. 1000 milliLiter(s) (83 mL/Hr) IV Continuous <Continuous>  doxazosin 2 milliGRAM(s) Oral at bedtime  folic acid 1 milliGRAM(s) Oral daily  lacosamide IVPB 150 milliGRAM(s) IV Intermittent every 12 hours  lactated ringers Bolus 500 milliLiter(s) IV Bolus once  metoprolol tartrate 25 milliGRAM(s) Oral every 6 hours  Nephro-roma 1 Tablet(s) Oral daily  pantoprazole  Injectable 40 milliGRAM(s) IV Push two times a day  vancomycin    Solution 125 milliGRAM(s) Oral every 6 hours    MEDICATIONS  (PRN):  acetaminophen     Tablet .. 650 milliGRAM(s) Oral every 6 hours PRN Temp greater or equal to 38C (100.4F), Mild Pain (1 - 3)  artificial tears (preservative free) Ophthalmic Solution 1 Drop(s) Both EYES every 6 hours PRN Dry Eyes      RADIOLOGY & ADDITIONAL TESTS:   DEUCE BALL    [id number]    83y      Male    INTERVAL HPI/OVERNIGHT EVENTS:  patient being seen for multi medical issues. Patient seen at bedside with wife and is in nad, tired appearing      REVIEW OF SYSTEMS:    unable to obtain secondary to mentals tatus       Vital Signs Last 24 Hrs  T(C): 36.6 (31 Dec 2023 12:00), Max: 37.3 (30 Dec 2023 19:14)  T(F): 97.8 (31 Dec 2023 12:00), Max: 99.1 (30 Dec 2023 19:14)  HR: 90 (31 Dec 2023 12:00) (81 - 99)  BP: 95/38 (31 Dec 2023 12:00) (94/63 - 114/69)  BP(mean): 56 (31 Dec 2023 12:00) (56 - 88)  RR: 12 (31 Dec 2023 12:00) (12 - 21)  SpO2: 96% (31 Dec 2023 12:00) (93% - 97%)    Parameters below as of 31 Dec 2023 12:00  Patient On (Oxygen Delivery Method): room air        PHYSICAL EXAM:  CONSTITUTIONAL: no respiratory distress.  HENMT: Scar on the right side of his head. not pale, anicteric ,moist oral mucosa,  RESPIRATORY: ctabl no wheezing, no crepitations,  CARDIOVASCULAR: Regular rate and rhythm, normal S1 and S2, no murmur/rub/gallop;   ABDOMEN: full soft ,nontender to palpation, normoactive bowel sounds, no rebound/guarding; No hepatosplenomegaly rectal tube with water diarrhea  MUSCLOSKELETAL: no clubbing or cyanosis of digits left arm in a splint and left leg in a splints  PSYCH:  alert , not oriented ot person , place, no time  NEUROLOGY: awake Patient does not follow commands most of the time.  SKIN: No rashes; no palpable lesions      LABS:                        9.7    12.83 )-----------( 260      ( 30 Dec 2023 10:53 )             29.0     12-30    136  |  104  |  18.7  ----------------------------<  138<H>  3.2<L>   |  24.0  |  0.67    Ca    7.7<L>      30 Dec 2023 10:53        Urinalysis Basic - ( 30 Dec 2023 10:53 )    Color: x / Appearance: x / SG: x / pH: x  Gluc: 138 mg/dL / Ketone: x  / Bili: x / Urobili: x   Blood: x / Protein: x / Nitrite: x   Leuk Esterase: x / RBC: x / WBC x   Sq Epi: x / Non Sq Epi: x / Bacteria: x          MEDICATIONS  (STANDING):  atorvastatin 80 milliGRAM(s) Oral at bedtime  caspofungin IVPB 50 milliGRAM(s) IV Intermittent every 24 hours  caspofungin IVPB      chlorhexidine 2% Cloths 1 Application(s) Topical <User Schedule>  dextrose 5%. 1000 milliLiter(s) (83 mL/Hr) IV Continuous <Continuous>  doxazosin 2 milliGRAM(s) Oral at bedtime  folic acid 1 milliGRAM(s) Oral daily  lacosamide IVPB 150 milliGRAM(s) IV Intermittent every 12 hours  lactated ringers Bolus 500 milliLiter(s) IV Bolus once  metoprolol tartrate 25 milliGRAM(s) Oral every 6 hours  Nephro-roma 1 Tablet(s) Oral daily  pantoprazole  Injectable 40 milliGRAM(s) IV Push two times a day  vancomycin    Solution 125 milliGRAM(s) Oral every 6 hours    MEDICATIONS  (PRN):  acetaminophen     Tablet .. 650 milliGRAM(s) Oral every 6 hours PRN Temp greater or equal to 38C (100.4F), Mild Pain (1 - 3)  artificial tears (preservative free) Ophthalmic Solution 1 Drop(s) Both EYES every 6 hours PRN Dry Eyes      RADIOLOGY & ADDITIONAL TESTS:

## 2023-12-31 NOTE — PROGRESS NOTE ADULT - ASSESSMENT
83y M with PMH HTN, CAD s/p stents, renal cell carcinoma status post left nephrectomy, bladder CA, BPH, CHF, LBBB, vertigo,  HLD, 5.5cm AAA without rupture post EVAR, paroxysmal A-fib on Eliquis, Plavix, and aspirin, early stage Alzheimer dementia transferred from [facility] s/p unwitnessed fall with head strike at home found by wife on floor at 8am. Patient brought to urgent care by wife and had 5 staples to posterior scalp but was instructed to go to nearest ED.  CT head at [place] showed R frontal IPH, SDH, SAH at 9:00. CTA stroke protocol not performed at [facility]. Patient BIB on 6L NC.  Pt GCS 15 on arrival, A&Ox2 on arrival. After initial assessment, patient noted to be vomiting enroute to CT scanner.   admitted 11/10 with prolonged hospitalization here.  being found to have right frontal IPH, 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  Extubated 11/13. Course complicated by Acute Kidney Injury, afib, EEG with epileptiform discharges. Patient was trasnferred to step down unit 11/23. TTE had shown possible vegetation on valve but repeat was not diagnostic and blood cultures were negative. Course further complicated by deep vein thrombosis or LUE brachial veins. Patient course further complicated by stroke found on MRI, WHIT recommended but family declined. He subsequently underwent cranioplasty on 12/7, drain removed 12/10. He had worsening of brain bleed 12/15, taken off anticoagulation and reversed. Patient then had acute decompensation on 12/21 for worsening mental status, vomiting, aspiration, and shock, requiring transfer to Intensive care unit for intubation, and pressors. Further complications including acute blood loss anemia requiring PRBCs, klebsiella pneumonia afib with RVR. He is now s/p extubation 12/25.        # Encephalopathy, multifactorial. ICH, CVA, ICU, possible delirium  Waxing, waning  - frequent reorientation  - Neurology signed off  family refusing vimpat    # acute respiratory failure  resolved    # aspiration pneumonia  - Aspiration precautions  - Monitor for hypoxia  - s/p Meropenem  - ID follow up      # C diff   - c/w  vancomycin 125mg Q 6    # Dysphagia   puree with no liquids  - SLP evaluation    # fungemia: 9 candida galbrata)  - c/w caspofungin  - Id consult appreciated.    # Atrial Fibrillation  -  change  iv Metoprolol to metorpolol 25mg Po Q 6 hours.   - No anticoagulation given ICH    # ICH s/p craniotomy and cranioplasty  abnormal EEG with risk of seizures  -  Levetiracetam changed to Lacosamide by Neurology after discussion with Neurology; family refusing AED  At risk for seizure and decompensation    # anemia: likely multifactorial    s/p PRBC    c/w PPI,     no intervention per GI.    # hypokalemia:   monitor      Full Code    Palliative involved,   prognosis grim    83y M with PMH HTN, CAD s/p stents, renal cell carcinoma status post left nephrectomy, bladder CA, BPH, CHF, LBBB, vertigo,  HLD, 5.5cm AAA without rupture post EVAR, paroxysmal A-fib on Eliquis, Plavix, and aspirin, early stage Alzheimer dementia transferred from [facility] s/p unwitnessed fall with head strike at home found by wife on floor at 8am. Patient brought to urgent care by wife and had 5 staples to posterior scalp but was instructed to go to nearest ED.  CT head at [place] showed R frontal IPH, SDH, SAH at 9:00. CTA stroke protocol not performed at [facility]. Patient BIB on 6L NC.  Pt GCS 15 on arrival, A&Ox2 on arrival. After initial assessment, patient noted to be vomiting enroute to CT scanner.   admitted 11/10 with prolonged hospitalization here.  being found to have right frontal IPH, 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  Extubated 11/13. Course complicated by Acute Kidney Injury, afib, EEG with epileptiform discharges. Patient was trasnferred to step down unit 11/23. TTE had shown possible vegetation on valve but repeat was not diagnostic and blood cultures were negative. Course further complicated by deep vein thrombosis or LUE brachial veins. Patient course further complicated by stroke found on MRI, WHIT recommended but family declined. He subsequently underwent cranioplasty on 12/7, drain removed 12/10. He had worsening of brain bleed 12/15, taken off anticoagulation and reversed. Patient then had acute decompensation on 12/21 for worsening mental status, vomiting, aspiration, and shock, requiring transfer to Intensive care unit for intubation, and pressors. Further complications including acute blood loss anemia requiring PRBCs, klebsiella pneumonia afib with RVR. He is now s/p extubation 12/25.        # Encephalopathy, multifactorial. ICH, CVA, ICU, possible delirium  Waxing, waning  - frequent reorientation  - Neurology signed off  family refusing vimpat    # acute respiratory failure  resolved    # aspiration pneumonia  - Aspiration precautions  - Monitor for hypoxia  - s/p Meropenem  - ID follow up      # C diff   - c/w  vancomycin 125mg Q 6    # Dysphagia   puree with no liquids  - SLP evaluation    # fungemia: 9 candida galbrata)  - c/w caspofungin  - Id consult appreciated.    # Atrial Fibrillation  -  change  iv Metoprolol to metorpolol 25mg Po Q 6 hours.   - No anticoagulation given ICH    # ICH s/p craniotomy and cranioplasty  abnormal EEG with risk of seizures  -  Levetiracetam changed to Lacosamide by Neurology after discussion with Neurology; family refusing AED  At risk for seizure and decompensation    # anemia: likely multifactorial    s/p PRBC    c/w PPI,     no intervention per GI.    # hypokalemia:   monitor      Full Code    Palliative involved,   prognosis grim

## 2024-01-01 LAB
ALBUMIN SERPL ELPH-MCNC: 2.2 G/DL — LOW (ref 3.3–5.2)
ALBUMIN SERPL ELPH-MCNC: 2.2 G/DL — LOW (ref 3.3–5.2)
ALP SERPL-CCNC: 75 U/L — SIGNIFICANT CHANGE UP (ref 40–120)
ALP SERPL-CCNC: 75 U/L — SIGNIFICANT CHANGE UP (ref 40–120)
ALT FLD-CCNC: 15 U/L — SIGNIFICANT CHANGE UP
ALT FLD-CCNC: 15 U/L — SIGNIFICANT CHANGE UP
ANION GAP SERPL CALC-SCNC: 11 MMOL/L — SIGNIFICANT CHANGE UP (ref 5–17)
ANION GAP SERPL CALC-SCNC: 11 MMOL/L — SIGNIFICANT CHANGE UP (ref 5–17)
AST SERPL-CCNC: 27 U/L — SIGNIFICANT CHANGE UP
AST SERPL-CCNC: 27 U/L — SIGNIFICANT CHANGE UP
BASOPHILS # BLD AUTO: 0.02 K/UL — SIGNIFICANT CHANGE UP (ref 0–0.2)
BASOPHILS # BLD AUTO: 0.02 K/UL — SIGNIFICANT CHANGE UP (ref 0–0.2)
BASOPHILS NFR BLD AUTO: 0.2 % — SIGNIFICANT CHANGE UP (ref 0–2)
BASOPHILS NFR BLD AUTO: 0.2 % — SIGNIFICANT CHANGE UP (ref 0–2)
BILIRUB SERPL-MCNC: 0.4 MG/DL — SIGNIFICANT CHANGE UP (ref 0.4–2)
BILIRUB SERPL-MCNC: 0.4 MG/DL — SIGNIFICANT CHANGE UP (ref 0.4–2)
BLD GP AB SCN SERPL QL: SIGNIFICANT CHANGE UP
BLD GP AB SCN SERPL QL: SIGNIFICANT CHANGE UP
BUN SERPL-MCNC: 17.3 MG/DL — SIGNIFICANT CHANGE UP (ref 8–20)
BUN SERPL-MCNC: 17.3 MG/DL — SIGNIFICANT CHANGE UP (ref 8–20)
CALCIUM SERPL-MCNC: 7.9 MG/DL — LOW (ref 8.4–10.5)
CALCIUM SERPL-MCNC: 7.9 MG/DL — LOW (ref 8.4–10.5)
CHLORIDE SERPL-SCNC: 103 MMOL/L — SIGNIFICANT CHANGE UP (ref 96–108)
CHLORIDE SERPL-SCNC: 103 MMOL/L — SIGNIFICANT CHANGE UP (ref 96–108)
CO2 SERPL-SCNC: 23 MMOL/L — SIGNIFICANT CHANGE UP (ref 22–29)
CO2 SERPL-SCNC: 23 MMOL/L — SIGNIFICANT CHANGE UP (ref 22–29)
CREAT SERPL-MCNC: 0.7 MG/DL — SIGNIFICANT CHANGE UP (ref 0.5–1.3)
CREAT SERPL-MCNC: 0.7 MG/DL — SIGNIFICANT CHANGE UP (ref 0.5–1.3)
EGFR: 92 ML/MIN/1.73M2 — SIGNIFICANT CHANGE UP
EGFR: 92 ML/MIN/1.73M2 — SIGNIFICANT CHANGE UP
EOSINOPHIL # BLD AUTO: 0.07 K/UL — SIGNIFICANT CHANGE UP (ref 0–0.5)
EOSINOPHIL # BLD AUTO: 0.07 K/UL — SIGNIFICANT CHANGE UP (ref 0–0.5)
EOSINOPHIL NFR BLD AUTO: 0.6 % — SIGNIFICANT CHANGE UP (ref 0–6)
EOSINOPHIL NFR BLD AUTO: 0.6 % — SIGNIFICANT CHANGE UP (ref 0–6)
GLUCOSE SERPL-MCNC: 115 MG/DL — HIGH (ref 70–99)
GLUCOSE SERPL-MCNC: 115 MG/DL — HIGH (ref 70–99)
HCT VFR BLD CALC: 26.8 % — LOW (ref 39–50)
HCT VFR BLD CALC: 26.8 % — LOW (ref 39–50)
HGB BLD-MCNC: 8.6 G/DL — LOW (ref 13–17)
HGB BLD-MCNC: 8.6 G/DL — LOW (ref 13–17)
IMM GRANULOCYTES NFR BLD AUTO: 0.5 % — SIGNIFICANT CHANGE UP (ref 0–0.9)
IMM GRANULOCYTES NFR BLD AUTO: 0.5 % — SIGNIFICANT CHANGE UP (ref 0–0.9)
LYMPHOCYTES # BLD AUTO: 1.25 K/UL — SIGNIFICANT CHANGE UP (ref 1–3.3)
LYMPHOCYTES # BLD AUTO: 1.25 K/UL — SIGNIFICANT CHANGE UP (ref 1–3.3)
LYMPHOCYTES # BLD AUTO: 11.3 % — LOW (ref 13–44)
LYMPHOCYTES # BLD AUTO: 11.3 % — LOW (ref 13–44)
MAGNESIUM SERPL-MCNC: 1.6 MG/DL — SIGNIFICANT CHANGE UP (ref 1.6–2.6)
MAGNESIUM SERPL-MCNC: 1.6 MG/DL — SIGNIFICANT CHANGE UP (ref 1.6–2.6)
MCHC RBC-ENTMCNC: 29.4 PG — SIGNIFICANT CHANGE UP (ref 27–34)
MCHC RBC-ENTMCNC: 29.4 PG — SIGNIFICANT CHANGE UP (ref 27–34)
MCHC RBC-ENTMCNC: 32.1 GM/DL — SIGNIFICANT CHANGE UP (ref 32–36)
MCHC RBC-ENTMCNC: 32.1 GM/DL — SIGNIFICANT CHANGE UP (ref 32–36)
MCV RBC AUTO: 91.5 FL — SIGNIFICANT CHANGE UP (ref 80–100)
MCV RBC AUTO: 91.5 FL — SIGNIFICANT CHANGE UP (ref 80–100)
MONOCYTES # BLD AUTO: 0.3 K/UL — SIGNIFICANT CHANGE UP (ref 0–0.9)
MONOCYTES # BLD AUTO: 0.3 K/UL — SIGNIFICANT CHANGE UP (ref 0–0.9)
MONOCYTES NFR BLD AUTO: 2.7 % — SIGNIFICANT CHANGE UP (ref 2–14)
MONOCYTES NFR BLD AUTO: 2.7 % — SIGNIFICANT CHANGE UP (ref 2–14)
NEUTROPHILS # BLD AUTO: 9.35 K/UL — HIGH (ref 1.8–7.4)
NEUTROPHILS # BLD AUTO: 9.35 K/UL — HIGH (ref 1.8–7.4)
NEUTROPHILS NFR BLD AUTO: 84.7 % — HIGH (ref 43–77)
NEUTROPHILS NFR BLD AUTO: 84.7 % — HIGH (ref 43–77)
PLATELET # BLD AUTO: 230 K/UL — SIGNIFICANT CHANGE UP (ref 150–400)
PLATELET # BLD AUTO: 230 K/UL — SIGNIFICANT CHANGE UP (ref 150–400)
POTASSIUM SERPL-MCNC: 3.1 MMOL/L — LOW (ref 3.5–5.3)
POTASSIUM SERPL-MCNC: 3.1 MMOL/L — LOW (ref 3.5–5.3)
POTASSIUM SERPL-SCNC: 3.1 MMOL/L — LOW (ref 3.5–5.3)
POTASSIUM SERPL-SCNC: 3.1 MMOL/L — LOW (ref 3.5–5.3)
PROT SERPL-MCNC: 5.3 G/DL — LOW (ref 6.6–8.7)
PROT SERPL-MCNC: 5.3 G/DL — LOW (ref 6.6–8.7)
RBC # BLD: 2.93 M/UL — LOW (ref 4.2–5.8)
RBC # BLD: 2.93 M/UL — LOW (ref 4.2–5.8)
RBC # FLD: 15 % — HIGH (ref 10.3–14.5)
RBC # FLD: 15 % — HIGH (ref 10.3–14.5)
SODIUM SERPL-SCNC: 137 MMOL/L — SIGNIFICANT CHANGE UP (ref 135–145)
SODIUM SERPL-SCNC: 137 MMOL/L — SIGNIFICANT CHANGE UP (ref 135–145)
WBC # BLD: 11.05 K/UL — HIGH (ref 3.8–10.5)
WBC # BLD: 11.05 K/UL — HIGH (ref 3.8–10.5)
WBC # FLD AUTO: 11.05 K/UL — HIGH (ref 3.8–10.5)
WBC # FLD AUTO: 11.05 K/UL — HIGH (ref 3.8–10.5)

## 2024-01-01 PROCEDURE — 99232 SBSQ HOSP IP/OBS MODERATE 35: CPT

## 2024-01-01 RX ORDER — MAGNESIUM SULFATE 500 MG/ML
2 VIAL (ML) INJECTION
Refills: 0 | Status: COMPLETED | OUTPATIENT
Start: 2024-01-01 | End: 2024-01-01

## 2024-01-01 RX ADMIN — Medication 25 GRAM(S): at 08:30

## 2024-01-01 RX ADMIN — Medication 125 MILLIGRAM(S): at 00:05

## 2024-01-01 RX ADMIN — Medication 125 MILLIGRAM(S): at 14:14

## 2024-01-01 RX ADMIN — Medication 25 MILLIGRAM(S): at 23:10

## 2024-01-01 RX ADMIN — CASPOFUNGIN ACETATE 260 MILLIGRAM(S): 7 INJECTION, POWDER, LYOPHILIZED, FOR SOLUTION INTRAVENOUS at 14:13

## 2024-01-01 RX ADMIN — Medication 125 MILLIGRAM(S): at 23:10

## 2024-01-01 RX ADMIN — Medication 2 MILLIGRAM(S): at 00:02

## 2024-01-01 RX ADMIN — Medication 125 MILLIGRAM(S): at 05:51

## 2024-01-01 RX ADMIN — Medication 125 MILLIGRAM(S): at 19:50

## 2024-01-01 RX ADMIN — PANTOPRAZOLE SODIUM 40 MILLIGRAM(S): 20 TABLET, DELAYED RELEASE ORAL at 05:55

## 2024-01-01 RX ADMIN — PANTOPRAZOLE SODIUM 40 MILLIGRAM(S): 20 TABLET, DELAYED RELEASE ORAL at 17:52

## 2024-01-01 RX ADMIN — Medication 25 MILLIGRAM(S): at 17:53

## 2024-01-01 RX ADMIN — Medication 25 MILLIGRAM(S): at 00:02

## 2024-01-01 RX ADMIN — CHLORHEXIDINE GLUCONATE 1 APPLICATION(S): 213 SOLUTION TOPICAL at 05:55

## 2024-01-01 RX ADMIN — LACOSAMIDE 130 MILLIGRAM(S): 50 TABLET ORAL at 20:40

## 2024-01-01 RX ADMIN — Medication 2 MILLIGRAM(S): at 22:25

## 2024-01-01 RX ADMIN — Medication 25 MILLIGRAM(S): at 05:54

## 2024-01-01 RX ADMIN — ATORVASTATIN CALCIUM 80 MILLIGRAM(S): 80 TABLET, FILM COATED ORAL at 22:25

## 2024-01-01 RX ADMIN — Medication 25 GRAM(S): at 10:36

## 2024-01-01 NOTE — PROGRESS NOTE ADULT - ASSESSMENT
83M w/ PMHX of HTN, CAD s/p PCI, renal cell ca s/p L nephrectomy, bladder CA, BPH, CHF, LBBB, vertigo,  HLD, 5.5cm AAA s/p post EVAR, paroxysmal afib on Eliquis, Plavix, ASA, h/o HIT, early stage Alzheimer's dementia, trf from [place] 11/10/23 s/p fall found with multicompartmental ICH, s/p R craniectomy 11/10 and eventual cranioplasty 12/7/23. Hospital course complicated by afib with RVR, Klebsiella pneumonia, LUE DVT, multiple subsequent episodes of ICH after attempting to restart Eliquis, now with acute respiratory failure, aspiration pneumonia, shock, hematemesis.    Plan  - Q4 neuro checks  - AED per neurology  - Pain control PRN; avoid oversedation to trend neuro exam  - Normotension  - Wound: permitted to shower gently near incision, pat dry, leave open to air.  - Continue holding Eliquis- unsure when and if patient would be a safe candidate to resume with multiple episodes of new ICH after attempting to resume. Risk benefit alternative analysis per primary team  - b/l SCDs  - Supportive care/medical management per primary team  - D/w Dr. Villasenor  83M w/ PMHX of HTN, CAD s/p PCI, renal cell ca s/p L nephrectomy, bladder CA, BPH, CHF, LBBB, vertigo,  HLD, 5.5cm AAA s/p post EVAR, paroxysmal afib on Eliquis, Plavix, ASA, h/o HIT, early stage Alzheimer's dementia, trf from [place] 11/10/23 s/p fall found with multicompartmental ICH, s/p R craniectomy 11/10 and eventual cranioplasty 12/7/23. Hospital course complicated by afib with RVR, Klebsiella pneumonia, LUE DVT, multiple subsequent episodes of ICH after attempting to restart Eliquis, now with acute respiratory failure, aspiration pneumonia, shock, hematemesis.    Plan  - Q4 neuro checks  - AED per neurology  - Pain control PRN; avoid oversedation to trend neuro exam  - Normotension  - Wound: permitted to shower gently near incision, pat dry, leave open to air.  - Continue holding Eliquis- unsure when and if patient would be a safe candidate to resume with multiple episodes of new ICH after attempting to resume. Risk benefit alternative analysis per primary team  - b/l SCDs  - Supportive care/medical management per primary team  - D/w Dr. Villasenor

## 2024-01-01 NOTE — PROGRESS NOTE ADULT - SUBJECTIVE AND OBJECTIVE BOX
INTERVAL HPI/OVERNIGHT EVENTS:  83M w/ PMHX of HTN, CAD s/p PCI, renal cell ca s/p L nephrectomy, bladder CA, BPH, CHF, LBBB, vertigo,  HLD, 5.5cm AAA s/p post EVAR, paroxysmal afib on Eliquis, Plavix, ASA, h/o HIT, early stage Alzheimer's dementia, trf from [place] 11/10/23 s/p fall found with multicompartmental ICH.  s/p R craniectomy 11/10/23  s/p cranioplasty 12/7/23  Hospital course complicated by afib with RVR, Klebsiella pneumonia, LUE DVT, multiple subsequent episodes of ICH after attempting to restart Eliquis, now with acute respiratory failure, aspiration pneumonia, shock, hematemesis.  Patient seen and examined this morning with neurosurgical team. C diff precautions. Exam stable.    Vital Signs Last 24 Hrs  T(C): 36.6 (01 Jan 2024 09:13), Max: 37.1 (31 Dec 2023 16:36)  T(F): 97.9 (01 Jan 2024 09:13), Max: 98.7 (31 Dec 2023 16:36)  HR: 76 (01 Jan 2024 12:00) (76 - 101)  BP: 100/59 (01 Jan 2024 12:00) (94/54 - 113/82)  BP(mean): 72 (01 Jan 2024 12:00) (72 - 94)  RR: 20 (01 Jan 2024 12:00) (14 - 22)  SpO2: 96% (01 Jan 2024 12:00) (95% - 98%)  Parameters below as of 01 Jan 2024 12:00  Patient On (Oxygen Delivery Method): room air    PHYSICAL EXAM:  GENERAL: NAD, resting comfortably  HEAD: s/p R cranioplasty. Incision clean and dry without drainage/dehiscence/erythema   JOEY COMA SCORE: E-4 V-4 M-6 =14  MENTAL STATUS: Awake, AAO x1 (name). Eyes open spontaneously. Limited words. Follows commands on right  CRANIAL NERVES: PERRL. EOMI. Face grossly symmetric. Hearing appears intact.   MOTOR/SENSATION: RUE bending at elbow, following commands w/ fingers. RLE wiggles toes. LUE/LLE 0/5 ?cooperation     LABS:                        8.6    11.05 )-----------( 230      ( 01 Jan 2024 04:44 )             26.8     01-01    137  |  103  |  17.3  ----------------------------<  115<H>  3.1<L>   |  23.0  |  0.70    Ca    7.9<L>      01 Jan 2024 04:44  Mg     1.6     01-01    TPro  5.3<L>  /  Alb  2.2<L>  /  TBili  0.4  /  DBili  x   /  AST  27  /  ALT  15  /  AlkPhos  75  01-01    Urinalysis Basic - ( 01 Jan 2024 04:44 )    Color: x / Appearance: x / SG: x / pH: x  Gluc: 115 mg/dL / Ketone: x  / Bili: x / Urobili: x   Blood: x / Protein: x / Nitrite: x   Leuk Esterase: x / RBC: x / WBC x   Sq Epi: x / Non Sq Epi: x / Bacteria: x    12-31 @ 07:01  -  01-01 @ 07:00  --------------------------------------------------------  IN: 300 mL / OUT: 1800 mL / NET: -1500 mL      RADIOLOGY & ADDITIONAL TESTS:  CT Head No Cont (12.22.23 @ 03:51)  IMPRESSION:  Interval decrease in size of the simple density fluid collection, likely   either a pseudomeningocele or seroma, superficial to the right-sided   cranioplasty.  No other significant change.   INTERVAL HPI/OVERNIGHT EVENTS:  83M w/ PMHX of HTN, CAD s/p PCI, renal cell ca s/p L nephrectomy, bladder CA, BPH, CHF, LBBB, vertigo,  HLD, 5.5cm AAA s/p post EVAR, paroxysmal afib on Eliquis, Plavix, ASA, h/o HIT, early stage Alzheimer's dementia, trf from [place] 11/10/23 s/p fall found with multicompartmental ICH.  s/p R craniectomy 11/10/23  s/p cranioplasty 12/7/23  Hospital course complicated by afib with RVR, Klebsiella pneumonia, LUE DVT, multiple subsequent episodes of ICH after attempting to restart Eliquis, now with acute respiratory failure, aspiration pneumonia, shock, hematemesis.  Patient seen and examined this morning with neurosurgical team. C diff precautions. Exam stable.    Vital Signs Last 24 Hrs  T(C): 36.6 (01 Jan 2024 09:13), Max: 37.1 (31 Dec 2023 16:36)  T(F): 97.9 (01 Jan 2024 09:13), Max: 98.7 (31 Dec 2023 16:36)  HR: 76 (01 Jan 2024 12:00) (76 - 101)  BP: 100/59 (01 Jan 2024 12:00) (94/54 - 113/82)  BP(mean): 72 (01 Jan 2024 12:00) (72 - 94)  RR: 20 (01 Jan 2024 12:00) (14 - 22)  SpO2: 96% (01 Jan 2024 12:00) (95% - 98%)  Parameters below as of 01 Jan 2024 12:00  Patient On (Oxygen Delivery Method): room air    PHYSICAL EXAM:  GENERAL: NAD, resting comfortably  HEAD: s/p R cranioplasty. Incision clean and dry without drainage/dehiscence/erythema   JOEY COMA SCORE: E-4 V-4 M-6 =14  MENTAL STATUS: Awake, AAO x1 (name). Eyes open spontaneously. Limited words. Follows commands on right  CRANIAL NERVES: PERRL. EOMI. Face grossly symmetric. Hearing appears intact.   MOTOR/SENSATION: RUE bending at elbow, following commands w/ fingers. RLE wiggles toes. LUE/LLE 0/5 ?cooperation     LABS:                        8.6    11.05 )-----------( 230      ( 01 Jan 2024 04:44 )             26.8     01-01    137  |  103  |  17.3  ----------------------------<  115<H>  3.1<L>   |  23.0  |  0.70    Ca    7.9<L>      01 Jan 2024 04:44  Mg     1.6     01-01    TPro  5.3<L>  /  Alb  2.2<L>  /  TBili  0.4  /  DBili  x   /  AST  27  /  ALT  15  /  AlkPhos  75  01-01    Urinalysis Basic - ( 01 Jan 2024 04:44 )    Color: x / Appearance: x / SG: x / pH: x  Gluc: 115 mg/dL / Ketone: x  / Bili: x / Urobili: x   Blood: x / Protein: x / Nitrite: x   Leuk Esterase: x / RBC: x / WBC x   Sq Epi: x / Non Sq Epi: x / Bacteria: x    12-31 @ 07:01  -  01-01 @ 07:00  --------------------------------------------------------  IN: 300 mL / OUT: 1800 mL / NET: -1500 mL      RADIOLOGY & ADDITIONAL TESTS:  CT Head No Cont (12.22.23 @ 03:51)  IMPRESSION:  Interval decrease in size of the simple density fluid collection, likely   either a pseudomeningocele or seroma, superficial to the right-sided   cranioplasty.  No other significant change.

## 2024-01-01 NOTE — PROGRESS NOTE ADULT - NS ATTEND BILL GEN_ALL_CORE
PA/NP to bill
Attending to bill
PA/NP to bill
Attending to bill
Attending to bill

## 2024-01-01 NOTE — PROGRESS NOTE ADULT - NS ATTEND OPT1 GEN_ALL_CORE
I attest my time as attending is greater than 50% of the total combined time spent on qualifying patient care activities by the PA/NP and attending.
I independently performed the documented:
I independently performed the documented:

## 2024-01-01 NOTE — PROGRESS NOTE ADULT - ASSESSMENT
83y M with PMH HTN, CAD s/p stents, renal cell carcinoma status post left nephrectomy, bladder CA, BPH, CHF, LBBB, vertigo,  HLD, 5.5cm AAA without rupture post EVAR, paroxysmal A-fib on Eliquis, Plavix, and aspirin, early stage Alzheimer dementia transferred from [facility] s/p unwitnessed fall with head strike at home found by wife on floor at 8am. Patient brought to urgent care by wife and had 5 staples to posterior scalp but was instructed to go to nearest ED.  CT head at [place] showed R frontal IPH, SDH, SAH at 9:00. CTA stroke protocol not performed at [facility]. Patient BIB on 6L NC.  Pt GCS 15 on arrival, A&Ox2 on arrival. After initial assessment, patient noted to be vomiting enroute to CT scanner.   admitted 11/10 with prolonged hospitalization here.  being found to have right frontal IPH, 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  Extubated 11/13. Course complicated by Acute Kidney Injury, afib, EEG with epileptiform discharges. Patient was trasnferred to step down unit 11/23. TTE had shown possible vegetation on valve but repeat was not diagnostic and blood cultures were negative. Course further complicated by deep vein thrombosis or LUE brachial veins. Patient course further complicated by stroke found on MRI, WHIT recommended but family declined. He subsequently underwent cranioplasty on 12/7, drain removed 12/10. He had worsening of brain bleed 12/15, taken off anticoagulation and reversed. Patient then had acute decompensation on 12/21 for worsening mental status, vomiting, aspiration, and shock, requiring transfer to Intensive care unit for intubation, and pressors. Further complications including acute blood loss anemia requiring PRBCs, klebsiella pneumonia afib with RVR. He is now s/p extubation 12/25.        # Encephalopathy, multifactorial. ICH, CVA, ICU, possible delirium  Waxing, waning  - frequent reorientation  - Neurology signed off  family refusing vimpat    # aspiration pneumonia  - Aspiration precautions  - Monitor for hypoxia  - s/p Meropenem  - ID follow up     # hypokalemia  - aggressively replete  - maintain mg >2, phos >4, K >4     # C diff   - c/w  vancomycin 125mg Q 6    # Dysphagia   puree with no liquids  - SLP evaluation    # fungemia: 9 candida galbrata)  - c/w caspofungin  - Id consult appreciated.    # Atrial Fibrillation  -  change  iv Metoprolol to metorpolol 25mg Po Q 6 hours.   - No anticoagulation given ICH    # ICH s/p craniotomy and cranioplasty  abnormal EEG with risk of seizures  -  Levetiracetam changed to Lacosamide by Neurology after discussion with Neurology; family refusing AED  At risk for seizure and decompensation    # anemia: likely multifactorial    s/p PRBC    c/w PPI,     no intervention per GI.    Full Code    Palliative involved,   prognosis grim  83y M with PMH HTN, CAD s/p stents, renal cell carcinoma status post left nephrectomy, bladder CA, BPH, CHF, LBBB, vertigo,  HLD, 5.5cm AAA without rupture post EVAR, paroxysmal A-fib on Eliquis, Plavix, and aspirin, early stage Alzheimer dementia transferred from [facility] s/p unwitnessed fall with head strike at home found by wife on floor at 8am. Patient brought to urgent care by wife and had 5 staples to posterior scalp but was instructed to go to nearest ED.  CT head at [place] showed R frontal IPH, SDH, SAH at 9:00. CTA stroke protocol not performed at [facility]. Patient BIB on 6L NC.  Pt GCS 15 on arrival, A&Ox2 on arrival. After initial assessment, patient noted to be vomiting enroute to CT scanner.   admitted 11/10 with prolonged hospitalization here.  being found to have right frontal IPH, 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  Extubated 11/13. Course complicated by Acute Kidney Injury, afib, EEG with epileptiform discharges. Patient was trasnferred to step down unit 11/23. TTE had shown possible vegetation on valve but repeat was not diagnostic and blood cultures were negative. Course further complicated by deep vein thrombosis or LUE brachial veins. Patient course further complicated by stroke found on MRI, WHIT recommended but family declined. He subsequently underwent cranioplasty on 12/7, drain removed 12/10. He had worsening of brain bleed 12/15, taken off anticoagulation and reversed. Patient then had acute decompensation on 12/21 for worsening mental status, vomiting, aspiration, and shock, requiring transfer to Intensive care unit for intubation, and pressors. Further complications including acute blood loss anemia requiring PRBCs, klebsiella pneumonia afib with RVR. He is now s/p extubation 12/25.        # Encephalopathy, multifactorial. ICH, CVA, ICU, possible delirium  Waxing, waning  - frequent reorientation  - Neurology signed off  family refusing vimpat    # aspiration pneumonia  - Aspiration precautions  - Monitor for hypoxia  - s/p Meropenem  - ID follow up     # hypokalemia  - aggressively replete  - maintain mg >2, phos >4, K >4     # C diff   - c/w  vancomycin 125mg Q 6    # Dysphagia   puree with no liquids  - SLP evaluation    # fungemia: 9 candida galbrata)  - c/w caspofungin  - Id consult appreciated.    # Atrial Fibrillation  -  change  iv Metoprolol to metorpolol 25mg Po Q 6 hours.   - No anticoagulation given ICH    # ICH s/p craniotomy and cranioplasty  abnormal EEG with risk of seizures  -  Levetiracetam changed to Lacosamide by Neurology after discussion with Neurology; family refusing AED  At risk for seizure and decompensation    # anemia: likely multifactorial    s/p PRBC    c/w PPI,     no intervention per GI.    Full Code    Palliative involved,   prognosis grim

## 2024-01-01 NOTE — PROGRESS NOTE ADULT - SUBJECTIVE AND OBJECTIVE BOX
Hospitalist Progress Note    Chief Complaint:  S/p unwitnessed fall w/ head strike    SUBJECTIVE / OVERNIGHT EVENTS:  no events overnight, patient seen at bedside, in NAD, unable to obtain ROS due to mental status.    MEDICATIONS  (STANDING):  atorvastatin 80 milliGRAM(s) Oral at bedtime  caspofungin IVPB      caspofungin IVPB 50 milliGRAM(s) IV Intermittent every 24 hours  chlorhexidine 2% Cloths 1 Application(s) Topical <User Schedule>  dextrose 5% + sodium chloride 0.9%. 1000 milliLiter(s) (75 mL/Hr) IV Continuous <Continuous>  doxazosin 2 milliGRAM(s) Oral at bedtime  folic acid 1 milliGRAM(s) Oral daily  lacosamide IVPB 150 milliGRAM(s) IV Intermittent every 12 hours  metoprolol tartrate 25 milliGRAM(s) Oral every 6 hours  Nephro-roma 1 Tablet(s) Oral daily  pantoprazole  Injectable 40 milliGRAM(s) IV Push two times a day  vancomycin    Solution 125 milliGRAM(s) Oral every 6 hours    MEDICATIONS  (PRN):  acetaminophen     Tablet .. 650 milliGRAM(s) Oral every 6 hours PRN Temp greater or equal to 38C (100.4F), Mild Pain (1 - 3)  artificial tears (preservative free) Ophthalmic Solution 1 Drop(s) Both EYES every 6 hours PRN Dry Eyes        I&O's Summary    31 Dec 2023 07:01  -  01 Jan 2024 07:00  --------------------------------------------------------  IN: 300 mL / OUT: 1800 mL / NET: -1500 mL        PHYSICAL EXAM:  Vital Signs Last 24 Hrs  T(C): 36.6 (01 Jan 2024 09:13), Max: 37.1 (31 Dec 2023 16:36)  T(F): 97.9 (01 Jan 2024 09:13), Max: 98.7 (31 Dec 2023 16:36)  HR: 88 (01 Jan 2024 08:00) (87 - 101)  BP: 94/54 (01 Jan 2024 08:00) (89/45 - 113/82)  BP(mean): 78 (01 Jan 2024 06:00) (59 - 94)  RR: 18 (01 Jan 2024 06:00) (14 - 22)  SpO2: 96% (01 Jan 2024 08:00) (95% - 98%)    Parameters below as of 01 Jan 2024 06:00  Patient On (Oxygen Delivery Method): room air        CONSTITUTIONAL: no respiratory distress.  HENMT: Scar on the right side of his head. not pale, anicteric ,moist oral mucosa,  RESPIRATORY: ctabl no wheezing, no crepitations,  CARDIOVASCULAR: Regular rate and rhythm, normal S1 and S2, no murmur/rub/gallop;   ABDOMEN: full soft ,nontender to palpation, normoactive bowel sounds, no rebound/guarding; No hepatosplenomegaly rectal tube with water diarrhea  MUSCLOSKELETAL: no clubbing or cyanosis of digits left arm in a splint and left leg in a splints  PSYCH:  alert , not oriented ot person , place, no time  NEUROLOGY: awake Patient does not follow commands most of the time.  SKIN: No rashes; no palpable lesions    LABS:                        8.6    11.05 )-----------( 230      ( 01 Jan 2024 04:44 )             26.8     01-01    137  |  103  |  17.3  ----------------------------<  115<H>  3.1<L>   |  23.0  |  0.70    Ca    7.9<L>      01 Jan 2024 04:44  Mg     1.6     01-01    TPro  5.3<L>  /  Alb  2.2<L>  /  TBili  0.4  /  DBili  x   /  AST  27  /  ALT  15  /  AlkPhos  75  01-01          Urinalysis Basic - ( 01 Jan 2024 04:44 )    Color: x / Appearance: x / SG: x / pH: x  Gluc: 115 mg/dL / Ketone: x  / Bili: x / Urobili: x   Blood: x / Protein: x / Nitrite: x   Leuk Esterase: x / RBC: x / WBC x   Sq Epi: x / Non Sq Epi: x / Bacteria: x        CAPILLARY BLOOD GLUCOSE            RADIOLOGY & ADDITIONAL TESTS:  Results Reviewed: Y  Imaging Personally Reviewed: N  Electrocardiogram Personally Reviewed: SANJANA

## 2024-01-02 LAB
ANION GAP SERPL CALC-SCNC: 10 MMOL/L — SIGNIFICANT CHANGE UP (ref 5–17)
ANION GAP SERPL CALC-SCNC: 10 MMOL/L — SIGNIFICANT CHANGE UP (ref 5–17)
BUN SERPL-MCNC: 15 MG/DL — SIGNIFICANT CHANGE UP (ref 8–20)
BUN SERPL-MCNC: 15 MG/DL — SIGNIFICANT CHANGE UP (ref 8–20)
CALCIUM SERPL-MCNC: 7.7 MG/DL — LOW (ref 8.4–10.5)
CALCIUM SERPL-MCNC: 7.7 MG/DL — LOW (ref 8.4–10.5)
CHLORIDE SERPL-SCNC: 101 MMOL/L — SIGNIFICANT CHANGE UP (ref 96–108)
CHLORIDE SERPL-SCNC: 101 MMOL/L — SIGNIFICANT CHANGE UP (ref 96–108)
CO2 SERPL-SCNC: 24 MMOL/L — SIGNIFICANT CHANGE UP (ref 22–29)
CO2 SERPL-SCNC: 24 MMOL/L — SIGNIFICANT CHANGE UP (ref 22–29)
CREAT SERPL-MCNC: 0.64 MG/DL — SIGNIFICANT CHANGE UP (ref 0.5–1.3)
CREAT SERPL-MCNC: 0.64 MG/DL — SIGNIFICANT CHANGE UP (ref 0.5–1.3)
CULTURE RESULTS: SIGNIFICANT CHANGE UP
EGFR: 95 ML/MIN/1.73M2 — SIGNIFICANT CHANGE UP
EGFR: 95 ML/MIN/1.73M2 — SIGNIFICANT CHANGE UP
GLUCOSE SERPL-MCNC: 109 MG/DL — HIGH (ref 70–99)
GLUCOSE SERPL-MCNC: 109 MG/DL — HIGH (ref 70–99)
HCT VFR BLD CALC: 26.6 % — LOW (ref 39–50)
HCT VFR BLD CALC: 26.6 % — LOW (ref 39–50)
HGB BLD-MCNC: 9.1 G/DL — LOW (ref 13–17)
HGB BLD-MCNC: 9.1 G/DL — LOW (ref 13–17)
MAGNESIUM SERPL-MCNC: 2.1 MG/DL — SIGNIFICANT CHANGE UP (ref 1.6–2.6)
MAGNESIUM SERPL-MCNC: 2.1 MG/DL — SIGNIFICANT CHANGE UP (ref 1.6–2.6)
MCHC RBC-ENTMCNC: 31.6 PG — SIGNIFICANT CHANGE UP (ref 27–34)
MCHC RBC-ENTMCNC: 31.6 PG — SIGNIFICANT CHANGE UP (ref 27–34)
MCHC RBC-ENTMCNC: 34.2 GM/DL — SIGNIFICANT CHANGE UP (ref 32–36)
MCHC RBC-ENTMCNC: 34.2 GM/DL — SIGNIFICANT CHANGE UP (ref 32–36)
MCV RBC AUTO: 92.4 FL — SIGNIFICANT CHANGE UP (ref 80–100)
MCV RBC AUTO: 92.4 FL — SIGNIFICANT CHANGE UP (ref 80–100)
PLATELET # BLD AUTO: 262 K/UL — SIGNIFICANT CHANGE UP (ref 150–400)
PLATELET # BLD AUTO: 262 K/UL — SIGNIFICANT CHANGE UP (ref 150–400)
POTASSIUM SERPL-MCNC: 3 MMOL/L — LOW (ref 3.5–5.3)
POTASSIUM SERPL-MCNC: 3 MMOL/L — LOW (ref 3.5–5.3)
POTASSIUM SERPL-SCNC: 3 MMOL/L — LOW (ref 3.5–5.3)
POTASSIUM SERPL-SCNC: 3 MMOL/L — LOW (ref 3.5–5.3)
RBC # BLD: 2.88 M/UL — LOW (ref 4.2–5.8)
RBC # BLD: 2.88 M/UL — LOW (ref 4.2–5.8)
RBC # FLD: 14.8 % — HIGH (ref 10.3–14.5)
RBC # FLD: 14.8 % — HIGH (ref 10.3–14.5)
SODIUM SERPL-SCNC: 135 MMOL/L — SIGNIFICANT CHANGE UP (ref 135–145)
SODIUM SERPL-SCNC: 135 MMOL/L — SIGNIFICANT CHANGE UP (ref 135–145)
SPECIMEN SOURCE: SIGNIFICANT CHANGE UP
WBC # BLD: 8.94 K/UL — SIGNIFICANT CHANGE UP (ref 3.8–10.5)
WBC # BLD: 8.94 K/UL — SIGNIFICANT CHANGE UP (ref 3.8–10.5)
WBC # FLD AUTO: 8.94 K/UL — SIGNIFICANT CHANGE UP (ref 3.8–10.5)
WBC # FLD AUTO: 8.94 K/UL — SIGNIFICANT CHANGE UP (ref 3.8–10.5)
WNV IGG CSF IA-ACNC: POSITIVE
WNV IGG CSF IA-ACNC: POSITIVE
WNV IGM CSF IA-ACNC: NEGATIVE — SIGNIFICANT CHANGE UP
WNV IGM CSF IA-ACNC: NEGATIVE — SIGNIFICANT CHANGE UP

## 2024-01-02 PROCEDURE — 99232 SBSQ HOSP IP/OBS MODERATE 35: CPT

## 2024-01-02 RX ORDER — POTASSIUM CHLORIDE 20 MEQ
40 PACKET (EA) ORAL ONCE
Refills: 0 | Status: COMPLETED | OUTPATIENT
Start: 2024-01-02 | End: 2024-01-02

## 2024-01-02 RX ORDER — POTASSIUM CHLORIDE 20 MEQ
40 PACKET (EA) ORAL ONCE
Refills: 0 | Status: COMPLETED | OUTPATIENT
Start: 2024-01-02 | End: 2024-01-03

## 2024-01-02 RX ORDER — POTASSIUM CHLORIDE 20 MEQ
40 PACKET (EA) ORAL ONCE
Refills: 0 | Status: DISCONTINUED | OUTPATIENT
Start: 2024-01-02 | End: 2024-01-02

## 2024-01-02 RX ADMIN — CHLORHEXIDINE GLUCONATE 1 APPLICATION(S): 213 SOLUTION TOPICAL at 05:47

## 2024-01-02 RX ADMIN — Medication 1 TABLET(S): at 12:12

## 2024-01-02 RX ADMIN — PANTOPRAZOLE SODIUM 40 MILLIGRAM(S): 20 TABLET, DELAYED RELEASE ORAL at 05:28

## 2024-01-02 RX ADMIN — Medication 125 MILLIGRAM(S): at 18:44

## 2024-01-02 RX ADMIN — Medication 125 MILLIGRAM(S): at 05:32

## 2024-01-02 RX ADMIN — Medication 25 MILLIGRAM(S): at 19:01

## 2024-01-02 RX ADMIN — Medication 40 MILLIEQUIVALENT(S): at 08:40

## 2024-01-02 RX ADMIN — PANTOPRAZOLE SODIUM 40 MILLIGRAM(S): 20 TABLET, DELAYED RELEASE ORAL at 19:01

## 2024-01-02 RX ADMIN — ATORVASTATIN CALCIUM 80 MILLIGRAM(S): 80 TABLET, FILM COATED ORAL at 21:57

## 2024-01-02 RX ADMIN — LACOSAMIDE 130 MILLIGRAM(S): 50 TABLET ORAL at 18:44

## 2024-01-02 RX ADMIN — Medication 2 MILLIGRAM(S): at 21:58

## 2024-01-02 RX ADMIN — Medication 1 MILLIGRAM(S): at 12:13

## 2024-01-02 RX ADMIN — LACOSAMIDE 130 MILLIGRAM(S): 50 TABLET ORAL at 05:28

## 2024-01-02 RX ADMIN — Medication 25 MILLIGRAM(S): at 05:27

## 2024-01-02 RX ADMIN — Medication 125 MILLIGRAM(S): at 12:13

## 2024-01-02 RX ADMIN — CASPOFUNGIN ACETATE 260 MILLIGRAM(S): 7 INJECTION, POWDER, LYOPHILIZED, FOR SOLUTION INTRAVENOUS at 12:13

## 2024-01-02 NOTE — PROGRESS NOTE ADULT - ASSESSMENT
83y M with PMH HTN, CAD s/p stents, renal cell carcinoma status post left nephrectomy, bladder CA, BPH, CHF, LBBB, vertigo,  HLD, 5.5cm AAA without rupture post EVAR, paroxysmal A-fib on Eliquis, Plavix, and aspirin, early stage Alzheimer dementia transferred from [facility] s/p unwitnessed fall with head strike at home found by wife on floor at 8am. Patient brought to urgent care by wife and had 5 staples to posterior scalp but was instructed to go to nearest ED.  CT head at [facility] showed R frontal IPH, SDH, SAH at 9:00. CTA stroke protocol not performed at [facility]. Patient BIB on 6L NC.  Pt GCS 15 on arrival, A&Ox2 on arrival. After initial assessment, patient noted to be vomiting enroute to CT scanner.   admitted 11/10 with prolonged hospitalization here.  being found to have right frontal IPH, 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  Extubated 11/13. Course complicated by Acute Kidney Injury, afib, EEG with epileptiform discharges. Patient was trasnferred to step down unit 11/23. TTE had shown possible vegetation on valve but repeat was not diagnostic and blood cultures were negative. Course further complicated by deep vein thrombosis or LUE brachial veins. Patient course further complicated by stroke found on MRI, WHIT recommended but family declined. He subsequently underwent cranioplasty on 12/7, drain removed 12/10. He had worsening of brain bleed 12/15, taken off anticoagulation and reversed. Patient then had acute decompensation on 12/21 for worsening mental status, vomiting, aspiration, and shock, requiring transfer to Intensive care unit for intubation, and pressors. Further complications including acute blood loss anemia requiring PRBCs, klebsiella pneumonia afib with RVR. He is now s/p extubation 12/25.        # Encephalopathy, multifactorial. ICH, CVA, ICU, possible delirium  Waxing, waning  - frequent reorientation  - Neurology signed off    # aspiration pneumonia  - Aspiration precautions  - Monitor for hypoxia  - s/p Meropenem  - ID follow up     # hypokalemia  - K 3.0  - Replete  - Monitor BMP  - maintain mg >2, phos >4, K >4     # C diff   - c/w vancomycin 125mg Q 6h    # Dysphagia   puree with no liquids  - SLP evaluation noted    # fungemia: 9 candida galbrata  - c/w caspofungin  - ID consult appreciated    # Atrial Fibrillation  - Metoprolol 25mg q6h  - No anticoagulation given ICH    # ICH s/p craniotomy and cranioplasty  abnormal EEG with risk of seizures  - Vimpat 150mg q12h    # anemia: likely multifactorial    s/p PRBC    c/w PPI,     no intervention per GI.    DVT ppx  - SCD 83y M with PMH HTN, CAD s/p stents, renal cell carcinoma status post left nephrectomy, bladder CA, BPH, CHF, LBBB, vertigo,  HLD, 5.5cm AAA without rupture post EVAR, paroxysmal A-fib on Eliquis, Plavix, and aspirin, early stage Alzheimer dementia transferred from [facility] s/p unwitnessed fall with head strike at home found by wife on floor at 8am. Patient brought to urgent care by wife and had 5 staples to posterior scalp but was instructed to go to nearest ED.  CT head at [place] showed R frontal IPH, SDH, SAH at 9:00. CTA stroke protocol not performed at [facility]. Patient BIB on 6L NC.  Pt GCS 15 on arrival, A&Ox2 on arrival. After initial assessment, patient noted to be vomiting enroute to CT scanner.   admitted 11/10 with prolonged hospitalization here.  being found to have right frontal IPH, 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  Extubated 11/13. Course complicated by Acute Kidney Injury, afib, EEG with epileptiform discharges. Patient was trasnferred to step down unit 11/23. TTE had shown possible vegetation on valve but repeat was not diagnostic and blood cultures were negative. Course further complicated by deep vein thrombosis or LUE brachial veins. Patient course further complicated by stroke found on MRI, WHIT recommended but family declined. He subsequently underwent cranioplasty on 12/7, drain removed 12/10. He had worsening of brain bleed 12/15, taken off anticoagulation and reversed. Patient then had acute decompensation on 12/21 for worsening mental status, vomiting, aspiration, and shock, requiring transfer to Intensive care unit for intubation, and pressors. Further complications including acute blood loss anemia requiring PRBCs, klebsiella pneumonia afib with RVR. He is now s/p extubation 12/25.        # Encephalopathy, multifactorial. ICH, CVA, ICU, possible delirium  Waxing, waning  - frequent reorientation  - Neurology signed off    # aspiration pneumonia  - Aspiration precautions  - Monitor for hypoxia  - s/p Meropenem  - ID follow up     # hypokalemia  - K 3.0  - Replete  - Monitor BMP  - maintain mg >2, phos >4, K >4     # C diff   - c/w vancomycin 125mg Q 6h    # Dysphagia   puree with no liquids  - SLP evaluation noted    # fungemia: 9 candida galbrata  - c/w caspofungin  - ID consult appreciated    # Atrial Fibrillation  - Metoprolol 25mg q6h  - No anticoagulation given ICH    # ICH s/p craniotomy and cranioplasty  abnormal EEG with risk of seizures  - Vimpat 150mg q12h    # anemia: likely multifactorial    s/p PRBC    c/w PPI,     no intervention per GI.    DVT ppx  - SCD

## 2024-01-02 NOTE — CHART NOTE - NSCHARTNOTEFT_GEN_A_CORE
Source: Patient [ ]  Family [ ]   other [x ]    Current Diet: Diet, Pureed:   No Liquids (NOLIQUIDS) (12-29-23 @ 10:08)      Patient reports [ ] nausea  [ ] vomiting [ ] diarrhea [ ] constipation  [ ]chewing problems [ ] swallowing issues  [ ] other:     PO intake:  < 50% [x ]   50-75%  [ ]   %  [ ]  other :    Current Weight:   (12/7) 123 lbs  (11/28) 143.5 lbs  (11/27) 141 lbs  (11/23) 145 lbs  (11/22) 134.9 lbs  (11/21) 149.9 lbs  (11/20) 137.3 lbs  (11/18) 140.2 lbs  No new weight, noted with 1+ generalized edema; weight loss likely given prolonged hospitalization, continue to trend and maintain strict Is&Os     Pertinent Medications: MEDICATIONS  (STANDING):  dextrose 5% + sodium chloride 0.9%. 1000 milliLiter(s) (75 mL/Hr) IV Continuous <Continuous>  folic acid 1 milliGRAM(s) Oral daily  Nephro-roma 1 Tablet(s) Oral daily  pantoprazole  Injectable 40 milliGRAM(s) IV Push two times a day  potassium chloride   Powder 40 milliEquivalent(s) Oral once  vancomycin    Solution 125 milliGRAM(s) Oral every 6 hours    Pertinent Labs: CBC Full  -  ( 02 Jan 2024 08:00 )  WBC Count : 8.94 K/uL  RBC Count : 2.88 M/uL  Hemoglobin : 9.1 g/dL  Hematocrit : 26.6 %    01-02 Na135 mmol/L Glu 109 mg/dL<H> K+ 3.0 mmol/L<L> Cr  0.64 mg/dL BUN 15.0 mg/dL Phos n/a   Alb n/a   PAB n/a       Skin: Stage II pressure injury to sacrum and buttocks, stage I pressure injury to coccyx, IAD, and moisture associated dermatitis per documentation  Rivas: 11    Nutrition focused physical exam previously conducted  - found signs of malnutrition [ ]absent [x]present    Subcutaneous fat loss: Mod [x] Orbital fat pads region, [x]Buccal fat region, [x]Triceps region, [x]Ribs region    Muscle wasting: Mod [x]Temples region, [x]Clavicle region, [x]Shoulder region, [ ]Scapula region, [ ]Interosseous region,  [ ]thigh region, [ ]Calf region    Estimated Needs:   [x] no change since previous assessment  8136-4545 kcals/day (based on 30-35 kcal/kg BW 55.8 kg)  78-89 g pro/day (based on 1.6-1.8 g/kg BW 55.8 kg)  [ ] recalculated:     Current Nutrition Diagnosis: Pt remains at high nutrition risk secondary to severe (acute) protein calorie malnutrition related to inability to meet sufficient protein energy needs in setting of TBI, - R frontal IPH, SDH, tSAH; s/p R decompressive hemicraniectomy 11/10 as evidenced by physical signs of mod muscle/fat loss and meeting < 50% estimated energy needs > 5days, weight loss. Pt remains with suboptimal po intake. Aware seen by SLP yesterday 1/1 with recommendations for a pureed diet with moderately thick liquids, +strict aspiration precautions and 1:1 assistance. Last BM 1/1. RD to follow up.     Recommendations:   1) Change diet to pureed with moderately hick liquids per SLP recommendations (1/1) and add Ensure Plus High Protein (moderately thick) TID to optimize po intake and provide an additional 350 kcal, 20g protein per serving).   2) Change Nephro-Roma to MVI daily and add vitamin C 500mg daily to aid in wound healing.  3) Encourage po intake, monitor diet tolerance, and provide assistance at meals as needed.  4) Obtain daily weights to monitor trends.     Monitoring and Evaluation:   [ x] PO intake [x ] Tolerance to diet prescription [X] Weights  [X] Follow up per protocol [X] Labs: chem 8, mg, phos, H/H Source: Patient [ ]  Family [ ]   other [x ]    Current Diet: Diet, Pureed:   No Liquids (NOLIQUIDS) (12-29-23 @ 10:08)      Patient reports [ ] nausea  [ ] vomiting [ ] diarrhea [ ] constipation  [ ]chewing problems [ ] swallowing issues  [ ] other:     PO intake:  < 50% [x ]   50-75%  [ ]   %  [ ]  other :    Current Weight:   (12/7) 123 lbs  (11/28) 143.5 lbs  (11/27) 141 lbs  (11/23) 145 lbs  (11/22) 134.9 lbs  (11/21) 149.9 lbs  (11/20) 137.3 lbs  (11/18) 140.2 lbs  No new weight, noted with 1+ generalized edema; weight loss likely given prolonged hospitalization, continue to trend and maintain strict Is&Os     Pertinent Medications: MEDICATIONS  (STANDING):  dextrose 5% + sodium chloride 0.9%. 1000 milliLiter(s) (75 mL/Hr) IV Continuous <Continuous>  folic acid 1 milliGRAM(s) Oral daily  Nephro-roma 1 Tablet(s) Oral daily  pantoprazole  Injectable 40 milliGRAM(s) IV Push two times a day  potassium chloride   Powder 40 milliEquivalent(s) Oral once  vancomycin    Solution 125 milliGRAM(s) Oral every 6 hours    Pertinent Labs: CBC Full  -  ( 02 Jan 2024 08:00 )  WBC Count : 8.94 K/uL  RBC Count : 2.88 M/uL  Hemoglobin : 9.1 g/dL  Hematocrit : 26.6 %    01-02 Na135 mmol/L Glu 109 mg/dL<H> K+ 3.0 mmol/L<L> Cr  0.64 mg/dL BUN 15.0 mg/dL Phos n/a   Alb n/a   PAB n/a       Skin: Stage II pressure injury to sacrum and buttocks, stage I pressure injury to coccyx, IAD, and moisture associated dermatitis per documentation  Rivas: 11    Nutrition focused physical exam previously conducted  - found signs of malnutrition [ ]absent [x]present    Subcutaneous fat loss: Mod [x] Orbital fat pads region, [x]Buccal fat region, [x]Triceps region, [x]Ribs region    Muscle wasting: Mod [x]Temples region, [x]Clavicle region, [x]Shoulder region, [ ]Scapula region, [ ]Interosseous region,  [ ]thigh region, [ ]Calf region    Estimated Needs:   [x] no change since previous assessment  8123-4835 kcals/day (based on 30-35 kcal/kg BW 55.8 kg)  78-89 g pro/day (based on 1.6-1.8 g/kg BW 55.8 kg)  [ ] recalculated:     Current Nutrition Diagnosis: Pt remains at high nutrition risk secondary to severe (acute) protein calorie malnutrition related to inability to meet sufficient protein energy needs in setting of TBI, - R frontal IPH, SDH, tSAH; s/p R decompressive hemicraniectomy 11/10 as evidenced by physical signs of mod muscle/fat loss and meeting < 50% estimated energy needs > 5days, weight loss. Pt remains with suboptimal po intake. Aware seen by SLP yesterday 1/1 with recommendations for a pureed diet with moderately thick liquids, +strict aspiration precautions and 1:1 assistance. Last BM 1/1. RD to follow up.     Recommendations:   1) Change diet to pureed with moderately hick liquids per SLP recommendations (1/1) and add Ensure Plus High Protein (moderately thick) TID to optimize po intake and provide an additional 350 kcal, 20g protein per serving).   2) Change Nephro-Roma to MVI daily and add vitamin C 500mg daily to aid in wound healing.  3) Encourage po intake, monitor diet tolerance, and provide assistance at meals as needed.  4) Obtain daily weights to monitor trends.     Monitoring and Evaluation:   [ x] PO intake [x ] Tolerance to diet prescription [X] Weights  [X] Follow up per protocol [X] Labs: chem 8, mg, phos, H/H

## 2024-01-02 NOTE — PROGRESS NOTE ADULT - SUBJECTIVE AND OBJECTIVE BOX
Chief complaint: Fall    Patient seen and examined at bedside. No acute overnight events reported. Patient denies headache, fever, chills, nausea or vomiting.     Vital Signs Last 24 Hrs  T(F): 97.7 (02 Jan 2024 08:04), Max: 98.2 (01 Jan 2024 21:30)  HR: 88 (02 Jan 2024 08:00) (76 - 99)  BP: 111/66 (02 Jan 2024 08:00) (100/59 - 131/35)  RR: 13 (02 Jan 2024 08:00) (12 - 20)  SpO2: 96% (02 Jan 2024 08:00) (94% - 96%)    Physical Exam:  Constitutional: alert, in no acute distress   Neck: Soft and supple  Respiratory: Good air entry b/l  Cardiovascular: Regular rate and rhyhtm  Gastrointestinal: Soft, non-tender to palpation, +bs  Vascular: 2+ peripheral pulses  Musculoskeletal:  no lower extremity edema bilaterally    Labs:                        9.1    8.94  )-----------( 262      ( 02 Jan 2024 08:00 )             26.6   01-02    135  |  101  |  15.0  ----------------------------<  109<H>  3.0<L>   |  24.0  |  0.64    Ca    7.7<L>      02 Jan 2024 08:00  Mg     2.1     01-02    TPro  5.3<L>  /  Alb  2.2<L>  /  TBili  0.4  /  DBili  x   /  AST  27  /  ALT  15  /  AlkPhos  75  01-01

## 2024-01-03 LAB
ANION GAP SERPL CALC-SCNC: 9 MMOL/L — SIGNIFICANT CHANGE UP (ref 5–17)
ANION GAP SERPL CALC-SCNC: 9 MMOL/L — SIGNIFICANT CHANGE UP (ref 5–17)
BASOPHILS # BLD AUTO: 0.04 K/UL — SIGNIFICANT CHANGE UP (ref 0–0.2)
BASOPHILS # BLD AUTO: 0.04 K/UL — SIGNIFICANT CHANGE UP (ref 0–0.2)
BASOPHILS NFR BLD AUTO: 0.5 % — SIGNIFICANT CHANGE UP (ref 0–2)
BASOPHILS NFR BLD AUTO: 0.5 % — SIGNIFICANT CHANGE UP (ref 0–2)
BUN SERPL-MCNC: 15.5 MG/DL — SIGNIFICANT CHANGE UP (ref 8–20)
BUN SERPL-MCNC: 15.5 MG/DL — SIGNIFICANT CHANGE UP (ref 8–20)
CALCIUM SERPL-MCNC: 8.7 MG/DL — SIGNIFICANT CHANGE UP (ref 8.4–10.5)
CALCIUM SERPL-MCNC: 8.7 MG/DL — SIGNIFICANT CHANGE UP (ref 8.4–10.5)
CHLORIDE SERPL-SCNC: 106 MMOL/L — SIGNIFICANT CHANGE UP (ref 96–108)
CHLORIDE SERPL-SCNC: 106 MMOL/L — SIGNIFICANT CHANGE UP (ref 96–108)
CO2 SERPL-SCNC: 26 MMOL/L — SIGNIFICANT CHANGE UP (ref 22–29)
CO2 SERPL-SCNC: 26 MMOL/L — SIGNIFICANT CHANGE UP (ref 22–29)
CREAT SERPL-MCNC: 0.78 MG/DL — SIGNIFICANT CHANGE UP (ref 0.5–1.3)
CREAT SERPL-MCNC: 0.78 MG/DL — SIGNIFICANT CHANGE UP (ref 0.5–1.3)
EGFR: 89 ML/MIN/1.73M2 — SIGNIFICANT CHANGE UP
EGFR: 89 ML/MIN/1.73M2 — SIGNIFICANT CHANGE UP
EOSINOPHIL # BLD AUTO: 0.04 K/UL — SIGNIFICANT CHANGE UP (ref 0–0.5)
EOSINOPHIL # BLD AUTO: 0.04 K/UL — SIGNIFICANT CHANGE UP (ref 0–0.5)
EOSINOPHIL NFR BLD AUTO: 0.5 % — SIGNIFICANT CHANGE UP (ref 0–6)
EOSINOPHIL NFR BLD AUTO: 0.5 % — SIGNIFICANT CHANGE UP (ref 0–6)
GLUCOSE SERPL-MCNC: 114 MG/DL — HIGH (ref 70–99)
GLUCOSE SERPL-MCNC: 114 MG/DL — HIGH (ref 70–99)
HCT VFR BLD CALC: 29.1 % — LOW (ref 39–50)
HCT VFR BLD CALC: 29.1 % — LOW (ref 39–50)
HGB BLD-MCNC: 10 G/DL — LOW (ref 13–17)
HGB BLD-MCNC: 10 G/DL — LOW (ref 13–17)
IMM GRANULOCYTES NFR BLD AUTO: 0.3 % — SIGNIFICANT CHANGE UP (ref 0–0.9)
IMM GRANULOCYTES NFR BLD AUTO: 0.3 % — SIGNIFICANT CHANGE UP (ref 0–0.9)
LYMPHOCYTES # BLD AUTO: 0.81 K/UL — LOW (ref 1–3.3)
LYMPHOCYTES # BLD AUTO: 0.81 K/UL — LOW (ref 1–3.3)
LYMPHOCYTES # BLD AUTO: 9.3 % — LOW (ref 13–44)
LYMPHOCYTES # BLD AUTO: 9.3 % — LOW (ref 13–44)
MCHC RBC-ENTMCNC: 31.3 PG — SIGNIFICANT CHANGE UP (ref 27–34)
MCHC RBC-ENTMCNC: 31.3 PG — SIGNIFICANT CHANGE UP (ref 27–34)
MCHC RBC-ENTMCNC: 34.4 GM/DL — SIGNIFICANT CHANGE UP (ref 32–36)
MCHC RBC-ENTMCNC: 34.4 GM/DL — SIGNIFICANT CHANGE UP (ref 32–36)
MCV RBC AUTO: 91.2 FL — SIGNIFICANT CHANGE UP (ref 80–100)
MCV RBC AUTO: 91.2 FL — SIGNIFICANT CHANGE UP (ref 80–100)
MONOCYTES # BLD AUTO: 0.39 K/UL — SIGNIFICANT CHANGE UP (ref 0–0.9)
MONOCYTES # BLD AUTO: 0.39 K/UL — SIGNIFICANT CHANGE UP (ref 0–0.9)
MONOCYTES NFR BLD AUTO: 4.5 % — SIGNIFICANT CHANGE UP (ref 2–14)
MONOCYTES NFR BLD AUTO: 4.5 % — SIGNIFICANT CHANGE UP (ref 2–14)
NEUTROPHILS # BLD AUTO: 7.42 K/UL — HIGH (ref 1.8–7.4)
NEUTROPHILS # BLD AUTO: 7.42 K/UL — HIGH (ref 1.8–7.4)
NEUTROPHILS NFR BLD AUTO: 84.9 % — HIGH (ref 43–77)
NEUTROPHILS NFR BLD AUTO: 84.9 % — HIGH (ref 43–77)
PLATELET # BLD AUTO: 302 K/UL — SIGNIFICANT CHANGE UP (ref 150–400)
PLATELET # BLD AUTO: 302 K/UL — SIGNIFICANT CHANGE UP (ref 150–400)
POTASSIUM SERPL-MCNC: 4.5 MMOL/L — SIGNIFICANT CHANGE UP (ref 3.5–5.3)
POTASSIUM SERPL-MCNC: 4.5 MMOL/L — SIGNIFICANT CHANGE UP (ref 3.5–5.3)
POTASSIUM SERPL-SCNC: 4.5 MMOL/L — SIGNIFICANT CHANGE UP (ref 3.5–5.3)
POTASSIUM SERPL-SCNC: 4.5 MMOL/L — SIGNIFICANT CHANGE UP (ref 3.5–5.3)
RBC # BLD: 3.19 M/UL — LOW (ref 4.2–5.8)
RBC # BLD: 3.19 M/UL — LOW (ref 4.2–5.8)
RBC # FLD: 14.6 % — HIGH (ref 10.3–14.5)
RBC # FLD: 14.6 % — HIGH (ref 10.3–14.5)
SODIUM SERPL-SCNC: 141 MMOL/L — SIGNIFICANT CHANGE UP (ref 135–145)
SODIUM SERPL-SCNC: 141 MMOL/L — SIGNIFICANT CHANGE UP (ref 135–145)
WBC # BLD: 8.73 K/UL — SIGNIFICANT CHANGE UP (ref 3.8–10.5)
WBC # BLD: 8.73 K/UL — SIGNIFICANT CHANGE UP (ref 3.8–10.5)
WBC # FLD AUTO: 8.73 K/UL — SIGNIFICANT CHANGE UP (ref 3.8–10.5)
WBC # FLD AUTO: 8.73 K/UL — SIGNIFICANT CHANGE UP (ref 3.8–10.5)

## 2024-01-03 PROCEDURE — 99233 SBSQ HOSP IP/OBS HIGH 50: CPT

## 2024-01-03 PROCEDURE — 36000 PLACE NEEDLE IN VEIN: CPT | Mod: RT

## 2024-01-03 PROCEDURE — 99232 SBSQ HOSP IP/OBS MODERATE 35: CPT

## 2024-01-03 RX ORDER — POTASSIUM CHLORIDE 20 MEQ
40 PACKET (EA) ORAL ONCE
Refills: 0 | Status: COMPLETED | OUTPATIENT
Start: 2024-01-03 | End: 2024-01-03

## 2024-01-03 RX ADMIN — Medication 125 MILLIGRAM(S): at 01:52

## 2024-01-03 RX ADMIN — Medication 650 MILLIGRAM(S): at 12:38

## 2024-01-03 RX ADMIN — PANTOPRAZOLE SODIUM 40 MILLIGRAM(S): 20 TABLET, DELAYED RELEASE ORAL at 18:54

## 2024-01-03 RX ADMIN — Medication 40 MILLIEQUIVALENT(S): at 08:36

## 2024-01-03 RX ADMIN — Medication 25 MILLIGRAM(S): at 05:27

## 2024-01-03 RX ADMIN — LACOSAMIDE 130 MILLIGRAM(S): 50 TABLET ORAL at 05:27

## 2024-01-03 RX ADMIN — SODIUM CHLORIDE 75 MILLILITER(S): 9 INJECTION, SOLUTION INTRAVENOUS at 12:39

## 2024-01-03 RX ADMIN — CASPOFUNGIN ACETATE 260 MILLIGRAM(S): 7 INJECTION, POWDER, LYOPHILIZED, FOR SOLUTION INTRAVENOUS at 16:18

## 2024-01-03 RX ADMIN — Medication 40 MILLIEQUIVALENT(S): at 12:37

## 2024-01-03 RX ADMIN — Medication 1 TABLET(S): at 12:38

## 2024-01-03 RX ADMIN — Medication 125 MILLIGRAM(S): at 05:26

## 2024-01-03 RX ADMIN — Medication 125 MILLIGRAM(S): at 12:39

## 2024-01-03 RX ADMIN — Medication 650 MILLIGRAM(S): at 15:20

## 2024-01-03 RX ADMIN — Medication 125 MILLIGRAM(S): at 18:54

## 2024-01-03 RX ADMIN — LACOSAMIDE 130 MILLIGRAM(S): 50 TABLET ORAL at 18:54

## 2024-01-03 RX ADMIN — Medication 2 MILLIGRAM(S): at 22:09

## 2024-01-03 RX ADMIN — Medication 25 MILLIGRAM(S): at 01:52

## 2024-01-03 RX ADMIN — ATORVASTATIN CALCIUM 80 MILLIGRAM(S): 80 TABLET, FILM COATED ORAL at 22:09

## 2024-01-03 RX ADMIN — CHLORHEXIDINE GLUCONATE 1 APPLICATION(S): 213 SOLUTION TOPICAL at 06:38

## 2024-01-03 RX ADMIN — Medication 1 MILLIGRAM(S): at 12:38

## 2024-01-03 RX ADMIN — PANTOPRAZOLE SODIUM 40 MILLIGRAM(S): 20 TABLET, DELAYED RELEASE ORAL at 05:26

## 2024-01-03 NOTE — PROGRESS NOTE ADULT - ASSESSMENT
83M w/ PMHX of HTN, CAD s/p PCI, renal cell ca s/p L nephrectomy, bladder CA, BPH, CHF, LBBB, vertigo,  HLD, 5.5cm AAA s/p post EVAR, paroxysmal afib on Eliquis, Plavix, ASA, h/o HIT, early stage Alzheimer's dementia, trf from [place] 11/10/23 s/p fall found with multicompartmental ICH, s/p R craniectomy 11/10 and eventual cranioplasty 12/7/23. Hospital course complicated by afib with RVR, Klebsiella pneumonia, LUE DVT, multiple subsequent episodes of ICH after attempting to restart Eliquis, now with acute respiratory failure, aspiration pneumonia, shock, hematemesis.      Plan  - Q4 neuro checks  - AED per neurology  - Pain control PRN; avoid oversedation to trend neuro exam  - Normotension  - Record BMs  - Wound: permitted to shower gently near incision, pat dry, leave open to air.  - Continue holding Eliquis- unsure when and if patient would be a safe candidate to resume with multiple episodes of new ICH after attempting to resume. Risk benefit alternative analysis per primary team  - b/l SCDs  - Supportive care/medical management per primary team  - D/w Dr. Larios 83M w/ PMHX of HTN, CAD s/p PCI, renal cell ca s/p L nephrectomy, bladder CA, BPH, CHF, LBBB, vertigo,  HLD, 5.5cm AAA s/p post EVAR, paroxysmal afib on Eliquis, Plavix, ASA, h/o HIT, early stage Alzheimer's dementia, trf from [place] 11/10/23 s/p fall found with multicompartmental ICH, s/p R craniectomy 11/10 and eventual cranioplasty 12/7/23. Hospital course complicated by afib with RVR, Klebsiella pneumonia, LUE DVT, multiple subsequent episodes of ICH after attempting to restart Eliquis, now with acute respiratory failure, aspiration pneumonia, shock, hematemesis.      Plan  - Q4 neuro checks  - AED per neurology  - Pain control PRN; avoid oversedation to trend neuro exam  - Normotension  - Record BMs  - Wound: permitted to shower gently near incision, pat dry, leave open to air.  - Continue holding Eliquis- unsure when and if patient would be a safe candidate to resume with multiple episodes of new ICH after attempting to resume. Risk benefit alternative analysis per primary team  - b/l SCDs  - Supportive care/medical management per primary team  - D/w Dr. Larios

## 2024-01-03 NOTE — PROCEDURE NOTE - PROCEDURE DATE TIME, MLM
13-Nov-2023 11:13
27-Dec-2023
11-Nov-2023 10:52
10-Nov-2023 12:28
03-Jan-2024 15:40
21-Dec-2023 18:25
21-Dec-2023 15:23

## 2024-01-03 NOTE — PROCEDURE NOTE - NSANESTHESIA_GEN_A_CORE
no anesthesia administered
5cc/1% lidocaine
1% lidocaine
1% lidocaine
no anesthesia administered

## 2024-01-03 NOTE — PROCEDURE NOTE - NSPROCDETAILS_GEN_ALL_CORE
location identified, draped/prepped, sterile technique used/sterile dressing applied/sterile technique, catheter placed/supine position/ultrasound guidance
location identified, draped/prepped, sterile technique used/sterile dressing applied/sterile technique, catheter placed/supine position/ultrasound guidance
location identified, draped/prepped, sterile technique used, needle inserted/introduced/positive blood return obtained via catheter/connected to a pressurized flush line/sutured in place/hemostasis with direct pressure, dressing applied/Seldinger technique/all materials/supplies accounted for at end of procedure
location identified, draped/prepped, sterile technique used, needle inserted/introduced
location identified, draped/prepped, sterile technique used/blood seen on insertion/dressing applied/flushes easily/secured in place/sterile technique, catheter placed
location identified, draped/prepped, sterile technique used, needle inserted/introduced/positive blood return obtained via catheter/connected to a pressurized flush line/sutured in place/hemostasis with direct pressure, dressing applied/Seldinger technique
patient pre-oxygenated, tube inserted, placement confirmed
guidewire recovered/lumen(s) aspirated and flushed/sterile dressing applied/sterile technique, catheter placed/ultrasound guidance with use of sterile gel and probe cove

## 2024-01-03 NOTE — PROGRESS NOTE ADULT - ASSESSMENT
82y M with PMH HTN, CAD s/p stents, renal cell carcinoma status post left nephrectomy, bladder CA, BPH, CHF, LBBB, vertigo,  HLD, 5.5cm AAA without rupture post EVAR, paroxysmal A-fib on Eliquis, Plavix, and aspirin, early stage Alzheimer dementia transferred from [facility] s/p unwitnessed fall with head strike at home found by wife on floor at 8am. Patient brought to urgent care by wife and had 5 staples to posterior scalp but was instructed to go to nearest ED.  CT head at [place] showed R frontal IPH, SDH, SAH s/p decompressive craniectomy 11/10. Initial TTE with ?  AV vegetation vs calcification vs artifact, repeat TTE 11/21/23 nondiagnostic, family refused WHIT.   Treated for klebseila aerogenes tracheobronchitis,  LUE DVT, CVA, s/p cranioplasty on 12/7,   Hospital course c/b intracranial bleed, AMS, vomiting and suspected aspiration and septic shock. Patient  intubated 12/21 and ETT cx + enterobacter (I) to imi, BCX + c.glabrata. cta/p showed stable endoleak-no intervention per vascular  ID called for fungemia. Patient wih low grade temp and ongoing liquid stool in rectal tube      - Continue caspofungin  - Repeat BCX ngtd  - f/u BCX candida glabrata  - susceptibilities reviewed  - completed meropenem course for PNA  - rt fem line-removed  - CSF cultures neg   - c. diff positive with +toxins  - On oral vancomycin since 12/29   - 12/21 TTE noted.   - ophthal eval when stable  - leukocytosis resolved  - afebrile and hemodynamically stable     Will follow  82y M with PMH HTN, CAD s/p stents, renal cell carcinoma status post left nephrectomy, bladder CA, BPH, CHF, LBBB, vertigo,  HLD, 5.5cm AAA without rupture post EVAR, paroxysmal A-fib on Eliquis, Plavix, and aspirin, early stage Alzheimer dementia transferred from [facility] s/p unwitnessed fall with head strike at home found by wife on floor at 8am. Patient brought to urgent care by wife and had 5 staples to posterior scalp but was instructed to go to nearest ED.  CT head at [place] showed R frontal IPH, SDH, SAH s/p decompressive craniectomy 11/10. Initial TTE with ?  AV vegetation vs calcification vs artifact, repeat TTE 11/21/23 nondiagnostic, family refused WHIT.   Treated for klebseila aerogenes tracheobronchitis,  LUE DVT, CVA, s/p cranioplasty on 12/7,   Hospital course c/b intracranial bleed, AMS, vomiting and suspected aspiration and septic shock. Patient  intubated 12/21 and ETT cx + enterobacter (I) to imi, BCX + c.glabrata. cta/p showed stable endoleak-no intervention per vascular  ID called for fungemia. Patient wih low grade temp and ongoing liquid stool in rectal tube      - Continue caspofungin  - Repeat BCX ngtd  - f/u BCX candida glabrata  - susceptibilities reviewed  - completed meropenem course for PNA  - rt fem line-removed  - CSF cultures neg   - c. diff positive with +toxins  - On oral vancomycin since 12/29   - 12/21 TTE noted.   - ophthal eval when stable  - leukocytosis resolved  - afebrile and hemodynamically stable     Will follow

## 2024-01-03 NOTE — PROGRESS NOTE ADULT - SUBJECTIVE AND OBJECTIVE BOX
Chief complaint: Fall    Patient seen and examined at bedside. No acute overnight events reported. No fever, chills, cough, nausea or vomiting.     Vital Signs Last 24 Hrs  T(F): 97.1 (03 Jan 2024 08:01), Max: 98.4 (03 Jan 2024 04:00)  HR: 89 (03 Jan 2024 08:00) (89 - 99)  BP: 109/69 (03 Jan 2024 08:00) (108/72 - 143/51)  RR: 13 (03 Jan 2024 08:00) (13 - 20)  SpO2: 99% (03 Jan 2024 08:00) (97% - 99%)    Physical Exam:  Constitutional: alert, in no acute distress   Neck: Soft and supple  Respiratory: Clear to auscultation bilaterally  Cardiovascular: Regular rate and rhyhtm  Gastrointestinal: Soft, non-tender to palpation, +bs  Vascular: 2+ peripheral pulses  Musculoskeletal: no lower extremity edema bilaterally    Labs:                        9.1    8.94  )-----------( 262      ( 02 Jan 2024 08:00 )             26.6   01-02    135  |  101  |  15.0  ----------------------------<  109<H>  3.0<L>   |  24.0  |  0.64    Ca    7.7<L>      02 Jan 2024 08:00  Mg     2.1     01-02

## 2024-01-03 NOTE — PROCEDURE NOTE - NSCOMPLICATION_GEN_A_CORE
no complications
bleeding
no complications

## 2024-01-03 NOTE — PROCEDURE NOTE - NSPOSTCAREGUIDE_GEN_A_CORE
Keep the cast/splint/dressing clean and dry/Care for catheter as per unit/ICU protocols
Verbal/written post procedure instructions were given to patient/caregiver/Instructed patient/caregiver to follow-up with primary care physician/Instructed patient/caregiver regarding signs and symptoms of infection/Keep the cast/splint/dressing clean and dry/Care for catheter as per unit/ICU protocols
Care for catheter as per unit/ICU protocols
Keep the cast/splint/dressing clean and dry/Care for catheter as per unit/ICU protocols
Care for catheter as per unit/ICU protocols
Verbal/written post procedure instructions were given to patient/caregiver/Instructed patient/caregiver to follow-up with primary care physician/Instructed patient/caregiver regarding signs and symptoms of infection/Keep the cast/splint/dressing clean and dry/Care for catheter as per unit/ICU protocols

## 2024-01-03 NOTE — PROGRESS NOTE ADULT - SUBJECTIVE AND OBJECTIVE BOX
INTERVAL HPI/OVERNIGHT EVENTS:  83M w/ PMHX of HTN, CAD s/p PCI, renal cell ca s/p L nephrectomy, bladder CA, BPH, CHF, LBBB, vertigo,  HLD, 5.5cm AAA s/p post EVAR, paroxysmal afib on Eliquis, Plavix, ASA, h/o HIT, early stage Alzheimer's dementia, trf from [place] 11/10/23 s/p fall found with multicompartmental ICH.  s/p R craniectomy 11/10/23  s/p cranioplasty 12/7/23  Hospital course complicated by afib with RVR, Klebsiella pneumonia, LUE DVT, multiple subsequent episodes of ICH after attempting to restart Eliquis, now with acute respiratory failure, aspiration pneumonia, shock, hematemesis.  Patient seen and examined this morning with neurosurgical team. C diff precautions. Exam stable.    Vital Signs Last 24 Hrs  T(C): 36.2 (03 Jan 2024 08:01), Max: 36.9 (03 Jan 2024 04:00)  T(F): 97.1 (03 Jan 2024 08:01), Max: 98.4 (03 Jan 2024 04:00)  HR: 89 (03 Jan 2024 08:00) (89 - 99)  BP: 109/69 (03 Jan 2024 08:00) (108/72 - 143/51)  BP(mean): 83 (03 Jan 2024 04:00) (79 - 88)  RR: 13 (03 Jan 2024 08:00) (13 - 20)  SpO2: 99% (03 Jan 2024 08:00) (97% - 99%)  Parameters below as of 03 Jan 2024 08:00  Patient On (Oxygen Delivery Method): room air    PHYSICAL EXAM:  GENERAL: NAD, resting comfortably  HEAD: s/p R cranioplasty. Incision clean and dry without drainage/dehiscence/erythema   JOEY COMA SCORE: E-4 V-4 M-6 =14  MENTAL STATUS: Awake, AAO x2. Eyes open spontaneously. Limited words. Follows commands on right  CRANIAL NERVES: PERRL. EOMI. Face grossly symmetric. Hearing appears intact.   MOTOR/SENSATION: RUE bending at elbow, following commands w/ fingers. RLE wiggles toes. LUE/LLE no movement    LABS:             9.1    8.94  )-----------( 262      ( 02 Jan 2024 08:00 )             26.6     01-02    135  |  101  |  15.0  ----------------------------<  109<H>  3.0<L>   |  24.0  |  0.64    Ca    7.7<L>      02 Jan 2024 08:00  Mg     2.1     01-02    Urinalysis Basic - ( 02 Jan 2024 08:00 )    Color: x / Appearance: x / SG: x / pH: x  Gluc: 109 mg/dL / Ketone: x  / Bili: x / Urobili: x   Blood: x / Protein: x / Nitrite: x   Leuk Esterase: x / RBC: x / WBC x   Sq Epi: x / Non Sq Epi: x / Bacteria: x    01-02 @ 07:01  -  01-03 @ 07:00  --------------------------------------------------------  IN: 0 mL / OUT: 3500 mL / NET: -3500 mL      RADIOLOGY & ADDITIONAL TESTS:  CT Head No Cont (12.22.23 @ 03:51)  IMPRESSION:  Interval decrease in size of the simple density fluid collection, likely   either a pseudomeningocele or seroma, superficial to the right-sided   cranioplasty.  No other significant change.     INTERVAL HPI/OVERNIGHT EVENTS:  83M w/ PMHX of HTN, CAD s/p PCI, renal cell ca s/p L nephrectomy, bladder CA, BPH, CHF, LBBB, vertigo,  HLD, 5.5cm AAA s/p post EVAR, paroxysmal afib on Eliquis, Plavix, ASA, h/o HIT, early stage Alzheimer's dementia, trf from [employer] [facility] [facility] 11/10/23 s/p fall found with multicompartmental ICH.  s/p R craniectomy 11/10/23  s/p cranioplasty 12/7/23  Hospital course complicated by afib with RVR, Klebsiella pneumonia, LUE DVT, multiple subsequent episodes of ICH after attempting to restart Eliquis, now with acute respiratory failure, aspiration pneumonia, shock, hematemesis.  Patient seen and examined this morning with neurosurgical team. C diff precautions. Exam stable.    Vital Signs Last 24 Hrs  T(C): 36.2 (03 Jan 2024 08:01), Max: 36.9 (03 Jan 2024 04:00)  T(F): 97.1 (03 Jan 2024 08:01), Max: 98.4 (03 Jan 2024 04:00)  HR: 89 (03 Jan 2024 08:00) (89 - 99)  BP: 109/69 (03 Jan 2024 08:00) (108/72 - 143/51)  BP(mean): 83 (03 Jan 2024 04:00) (79 - 88)  RR: 13 (03 Jan 2024 08:00) (13 - 20)  SpO2: 99% (03 Jan 2024 08:00) (97% - 99%)  Parameters below as of 03 Jan 2024 08:00  Patient On (Oxygen Delivery Method): room air    PHYSICAL EXAM:  GENERAL: NAD, resting comfortably  HEAD: s/p R cranioplasty. Incision clean and dry without drainage/dehiscence/erythema   JOEY COMA SCORE: E-4 V-4 M-6 =14  MENTAL STATUS: Awake, AAO x2. Eyes open spontaneously. Limited words. Follows commands on right  CRANIAL NERVES: PERRL. EOMI. Face grossly symmetric. Hearing appears intact.   MOTOR/SENSATION: RUE bending at elbow, following commands w/ fingers. RLE wiggles toes. LUE/LLE no movement    LABS:             9.1    8.94  )-----------( 262      ( 02 Jan 2024 08:00 )             26.6     01-02    135  |  101  |  15.0  ----------------------------<  109<H>  3.0<L>   |  24.0  |  0.64    Ca    7.7<L>      02 Jan 2024 08:00  Mg     2.1     01-02    Urinalysis Basic - ( 02 Jan 2024 08:00 )    Color: x / Appearance: x / SG: x / pH: x  Gluc: 109 mg/dL / Ketone: x  / Bili: x / Urobili: x   Blood: x / Protein: x / Nitrite: x   Leuk Esterase: x / RBC: x / WBC x   Sq Epi: x / Non Sq Epi: x / Bacteria: x    01-02 @ 07:01  -  01-03 @ 07:00  --------------------------------------------------------  IN: 0 mL / OUT: 3500 mL / NET: -3500 mL      RADIOLOGY & ADDITIONAL TESTS:  CT Head No Cont (12.22.23 @ 03:51)  IMPRESSION:  Interval decrease in size of the simple density fluid collection, likely   either a pseudomeningocele or seroma, superficial to the right-sided   cranioplasty.  No other significant change.

## 2024-01-03 NOTE — PROGRESS NOTE ADULT - SUBJECTIVE AND OBJECTIVE BOX
[facility]  INFECTIOUS DISEASES at [place] and [place]  ===============================================================                  Roman Segura MD               Diplomates American Board of Internal Medicine & Infectious Diseases                * [place] Office - Appt - Tel  [phone] Fax [phone]                * [place] Office - Appt - Tel [phone] Fax [phone]                                  Hospital Consult line:  [phone]  ==============================================================    DEUCE BALL [id number]    Follow up: CDI, fungemia     Covering for Dr. Huerta   hemodynamically stable   Last fever 12/28     I have personally reviewed the labs and data; pertinent labs and data are listed in this note; please see below.     _______________________________________________________________  REVIEW OF SYSTEMS  ROS limited - denies pain, nausea, vomiting, HA   ________________________________________________________________  Allergies:  penicillin (Rash)  heparin (Other (Severe))  penicillin (Hives)  Cipro (Other)  Levaquin (Other)  heparin (Other)        ________________________________________________________________  PHYSICAL EXAM  GEN: elderly man in NAD, lying in bed.   HEENT: craniotomy incision c/d/i. Anicteric sclerae. Moist mucous membranes.   NECK: Supple.   LUNGS: eupneic. CTA B/L.  HEART: RRR, no m/r/g  ABDOMEN: Soft, NT, ND.  +BS.    : Vallecillo catheter  EXTREMITIES:  without  edema.  MSK: left arm in brace   NEUROLOGIC: left hemiplegia   PSYCHIATRIC: calm  LINES: PIV   ________________________________________________________________  Vitals:  T(F): 97.1 (03 Jan 2024 08:01), Max: 98.4 (03 Jan 2024 04:00)  HR: 89 (03 Jan 2024 08:00)  BP: 109/69 (03 Jan 2024 08:00)  RR: 13 (03 Jan 2024 08:00)  SpO2: 99% (03 Jan 2024 08:00) (97% - 99%)  temp max in last 48H T(F): , Max: 98.4 (01-03-24 @ 04:00)    Current Antibiotics:  caspofungin IVPB 50 milliGRAM(s) IV Intermittent every 24 hours  caspofungin IVPB      vancomycin    Solution 125 milliGRAM(s) Oral every 6 hours    Other medications:  atorvastatin 80 milliGRAM(s) Oral at bedtime  chlorhexidine 2% Cloths 1 Application(s) Topical <User Schedule>  dextrose 5% + sodium chloride 0.9%. 1000 milliLiter(s) IV Continuous <Continuous>  doxazosin 2 milliGRAM(s) Oral at bedtime  folic acid 1 milliGRAM(s) Oral daily  lacosamide IVPB 150 milliGRAM(s) IV Intermittent every 12 hours  metoprolol tartrate 25 milliGRAM(s) Oral every 6 hours  Nephro-roma 1 Tablet(s) Oral daily  pantoprazole  Injectable 40 milliGRAM(s) IV Push two times a day  potassium chloride    Tablet ER 40 milliEquivalent(s) Oral once                            9.1    8.94  )-----------( 262      ( 02 Jan 2024 08:00 )             26.6     01-02    135  |  101  |  15.0  ----------------------------<  109<H>  3.0<L>   |  24.0  |  0.64    Ca    7.7<L>      02 Jan 2024 08:00  Mg     2.1     01-02      RECENT CULTURES:  12-28 @ 05:22 .Blood Blood-Peripheral     No growth at 5 days    12-28 @ 05:22 RVP with SARS-CoV-2   NotDetec      12-25 @ 04:01 .Blood Blood     No growth at 5 days        12-25 @ 03:51 .Blood Blood     No growth at 5 days        12-22 @ 22:41 .Blood Blood-Peripheral Blood Culture PCR  Candida (Torulopsis) glabrata    Growth in aerobic bottle: Candida glabrata      Growth in aerobic bottle: Yeast like cells      12-22 @ 22:30 .Blood Blood-Peripheral     Growth in aerobic bottle: Candida glabrata  See previous culture [id number]    Growth in aerobic bottle: Yeast like cells      12-21 @ 19:44 .CSF CSF     No acid-fast bacilli isolated at 1 week. ****Acid-fast cultures are held  for 6 weeks.****    polymorphonuclear leukocytes seen  No organisms seen  by cytocentrifuge      12-21 @ 10:10 ET Tube ET Tube Klebsiella aerogenes (Previously Enterobacter)  Klebsiella aerogenes (Previously Enterobacter)    Numerous Klebsiella aerogenes (Previously Enterobacter)  Normal Respiratory Isabell present    Few polymorphonuclear leukocytes per low power field  Rare Squamous epithelial cells per low power field  Moderate Yeast like cells per oil power field  Moderate Gram Positive Rods per oil power field  Few Gram positive cocci in pairs per oil powerfield  Rare Gram Negative Rods per oil power field      WBC Count: 8.94 K/uL (01-02-24 @ 08:00)  WBC Count: 11.05 K/uL (01-01-24 @ 04:44)  WBC Count: 12.83 K/uL (12-30-23 @ 10:53)    Creatinine: 0.64 mg/dL (01-02-24 @ 08:00)  Creatinine: 0.70 mg/dL (01-01-24 @ 04:44)  Creatinine: 0.67 mg/dL (12-30-23 @ 10:53)       SARS-CoV-2: NotDetec (12-28-23 @ 05:22)  SARS-CoV-2: NotDetec (12-20-23 @ 09:55)  SARS-CoV-2: NotDetec (12-10-23 @ 18:20)    ________________________________________________________________  CARDIOLOGY   < from: TTE W or WO Ultrasound Enhancing Agent (12.21.23 @ 19:59) >  CONCLUSIONS:      1. Limited echo to rule out RV strain.   2. Technically difficult image quality.   3. Left ventricular cavity is normal. Left ventricular systolic function is hyperdynamic with an ejection fraction visually estimated at 70 to 75 %.   4. Normal right ventricular cavity size and systolic function.   5. No pericardial effusion seen.    < end of copied text >      ________________________________________________________________  RADIOLOGY  < from: Xray Chest 1 View- PORTABLE-Urgent (Xray Chest 1 View- PORTABLE-Urgent .) (12.28.23 @ 05:50) >  IMPRESSION:    New pulmonary vascular congestive changes.  Trace left pleural effusion.  Basilar atelectasis and/or pneumonia.    < end of copied text >   [facility]  INFECTIOUS DISEASES at [place] and [place]  ===============================================================                  Roman Segura MD               Diplomates American Board of Internal Medicine & Infectious Diseases                * [place] Office - Appt - Tel  [phone] Fax [phone]                * [place] Office - Appt - Tel [phone] Fax [phone]                                  Hospital Consult line:  [phone]  ==============================================================    DEUCE BALL [id number]    Follow up: CDI, fungemia     Covering for Dr. Huerta   hemodynamically stable   Last fever 12/28     I have personally reviewed the labs and data; pertinent labs and data are listed in this note; please see below.     _______________________________________________________________  REVIEW OF SYSTEMS  ROS limited - denies pain, nausea, vomiting, HA   ________________________________________________________________  Allergies:  penicillin (Rash)  heparin (Other (Severe))  penicillin (Hives)  Cipro (Other)  Levaquin (Other)  heparin (Other)        ________________________________________________________________  PHYSICAL EXAM  GEN: elderly man in NAD, lying in bed.   HEENT: craniotomy incision c/d/i. Anicteric sclerae. Moist mucous membranes.   NECK: Supple.   LUNGS: eupneic. CTA B/L.  HEART: RRR, no m/r/g  ABDOMEN: Soft, NT, ND.  +BS.    : Vallecillo catheter  EXTREMITIES:  without  edema.  MSK: left arm in brace   NEUROLOGIC: left hemiplegia   PSYCHIATRIC: calm  LINES: PIV   ________________________________________________________________  Vitals:  T(F): 97.1 (03 Jan 2024 08:01), Max: 98.4 (03 Jan 2024 04:00)  HR: 89 (03 Jan 2024 08:00)  BP: 109/69 (03 Jan 2024 08:00)  RR: 13 (03 Jan 2024 08:00)  SpO2: 99% (03 Jan 2024 08:00) (97% - 99%)  temp max in last 48H T(F): , Max: 98.4 (01-03-24 @ 04:00)    Current Antibiotics:  caspofungin IVPB 50 milliGRAM(s) IV Intermittent every 24 hours  caspofungin IVPB      vancomycin    Solution 125 milliGRAM(s) Oral every 6 hours    Other medications:  atorvastatin 80 milliGRAM(s) Oral at bedtime  chlorhexidine 2% Cloths 1 Application(s) Topical <User Schedule>  dextrose 5% + sodium chloride 0.9%. 1000 milliLiter(s) IV Continuous <Continuous>  doxazosin 2 milliGRAM(s) Oral at bedtime  folic acid 1 milliGRAM(s) Oral daily  lacosamide IVPB 150 milliGRAM(s) IV Intermittent every 12 hours  metoprolol tartrate 25 milliGRAM(s) Oral every 6 hours  Nephro-roma 1 Tablet(s) Oral daily  pantoprazole  Injectable 40 milliGRAM(s) IV Push two times a day  potassium chloride    Tablet ER 40 milliEquivalent(s) Oral once                            9.1    8.94  )-----------( 262      ( 02 Jan 2024 08:00 )             26.6     01-02    135  |  101  |  15.0  ----------------------------<  109<H>  3.0<L>   |  24.0  |  0.64    Ca    7.7<L>      02 Jan 2024 08:00  Mg     2.1     01-02      RECENT CULTURES:  12-28 @ 05:22 .Blood Blood-Peripheral     No growth at 5 days    12-28 @ 05:22 RVP with SARS-CoV-2   NotDetec      12-25 @ 04:01 .Blood Blood     No growth at 5 days        12-25 @ 03:51 .Blood Blood     No growth at 5 days        12-22 @ 22:41 .Blood Blood-Peripheral Blood Culture PCR  Candida (Torulopsis) glabrata    Growth in aerobic bottle: Candida glabrata      Growth in aerobic bottle: Yeast like cells      12-22 @ 22:30 .Blood Blood-Peripheral     Growth in aerobic bottle: Candida glabrata  See previous culture [id number]    Growth in aerobic bottle: Yeast like cells      12-21 @ 19:44 .CSF CSF     No acid-fast bacilli isolated at 1 week. ****Acid-fast cultures are held  for 6 weeks.****    polymorphonuclear leukocytes seen  No organisms seen  by cytocentrifuge      12-21 @ 10:10 ET Tube ET Tube Klebsiella aerogenes (Previously Enterobacter)  Klebsiella aerogenes (Previously Enterobacter)    Numerous Klebsiella aerogenes (Previously Enterobacter)  Normal Respiratory Isabell present    Few polymorphonuclear leukocytes per low power field  Rare Squamous epithelial cells per low power field  Moderate Yeast like cells per oil power field  Moderate Gram Positive Rods per oil power field  Few Gram positive cocci in pairs per oil powerfield  Rare Gram Negative Rods per oil power field      WBC Count: 8.94 K/uL (01-02-24 @ 08:00)  WBC Count: 11.05 K/uL (01-01-24 @ 04:44)  WBC Count: 12.83 K/uL (12-30-23 @ 10:53)    Creatinine: 0.64 mg/dL (01-02-24 @ 08:00)  Creatinine: 0.70 mg/dL (01-01-24 @ 04:44)  Creatinine: 0.67 mg/dL (12-30-23 @ 10:53)       SARS-CoV-2: NotDetec (12-28-23 @ 05:22)  SARS-CoV-2: NotDetec (12-20-23 @ 09:55)  SARS-CoV-2: NotDetec (12-10-23 @ 18:20)    ________________________________________________________________  CARDIOLOGY   < from: TTE W or WO Ultrasound Enhancing Agent (12.21.23 @ 19:59) >  CONCLUSIONS:      1. Limited echo to rule out RV strain.   2. Technically difficult image quality.   3. Left ventricular cavity is normal. Left ventricular systolic function is hyperdynamic with an ejection fraction visually estimated at 70 to 75 %.   4. Normal right ventricular cavity size and systolic function.   5. No pericardial effusion seen.    < end of copied text >      ________________________________________________________________  RADIOLOGY  < from: Xray Chest 1 View- PORTABLE-Urgent (Xray Chest 1 View- PORTABLE-Urgent .) (12.28.23 @ 05:50) >  IMPRESSION:    New pulmonary vascular congestive changes.  Trace left pleural effusion.  Basilar atelectasis and/or pneumonia.    < end of copied text >

## 2024-01-03 NOTE — PROCEDURE NOTE - ADDITIONAL PROCEDURE DETAILS
Called for non-functioning midline. No blood return and resistance w/ flushing. No evidence of infiltration on exam. Pt w/ b/l pink bands on both extremities for unclear reasons. Wife states that his left arm is the "bad arm" but does not know why arms are unable to be accessed. Right UE with midline present. Patient due for IV antifungal for fungemia. Escalated pink banding of extremities to hospitalist, however will obtain access on RUE in abscess of functioning midline.     US Guided 18g IV placed in right upper arm. +flash, flushes easily. Patient tolerated procedure well with no immediate complications. Tourniquet removed, all sharps disposed of appropriately.

## 2024-01-03 NOTE — PROGRESS NOTE ADULT - ASSESSMENT
83y M with PMH HTN, CAD s/p stents, renal cell carcinoma status post left nephrectomy, bladder CA, BPH, CHF, LBBB, vertigo,  HLD, 5.5cm AAA without rupture post EVAR, paroxysmal A-fib on Eliquis, Plavix, and aspirin, early stage Alzheimer dementia transferred from [facility] s/p unwitnessed fall with head strike at home found by wife on floor at 8am. Patient brought to urgent care by wife and had 5 staples to posterior scalp but was instructed to go to nearest ED.  CT head at [place] showed R frontal IPH, SDH, SAH at 9:00. CTA stroke protocol not performed at [facility]. Patient BIB on 6L NC.  Pt GCS 15 on arrival, A&Ox2 on arrival. After initial assessment, patient noted to be vomiting enroute to CT scanner.   admitted 11/10 with prolonged hospitalization here.  being found to have right frontal IPH, 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  Extubated 11/13. Course complicated by Acute Kidney Injury, afib, EEG with epileptiform discharges. Patient was trasnferred to step down unit 11/23. TTE had shown possible vegetation on valve but repeat was not diagnostic and blood cultures were negative. Course further complicated by deep vein thrombosis or LUE brachial veins. Patient course further complicated by stroke found on MRI, WHIT recommended but family declined. He subsequently underwent cranioplasty on 12/7, drain removed 12/10. He had worsening of brain bleed 12/15, taken off anticoagulation and reversed. Patient then had acute decompensation on 12/21 for worsening mental status, vomiting, aspiration, and shock, requiring transfer to Intensive care unit for intubation, and pressors. Further complications including acute blood loss anemia requiring PRBCs, klebsiella pneumonia afib with RVR. He is now s/p extubation 12/25.      # Encephalopathy, multifactorial. ICH, CVA, ICU, possible delirium  Waxing, waning  - frequent reorientation  - Neurology signed off    # aspiration pneumonia  - Aspiration precautions  - Monitor for hypoxia  - s/p Meropenem  - ID follow up     # hypokalemia  - K 3.0  - Replete  - Monitor BMP  - maintain mg >2, phos >4, K >4     # C diff   - c/w vancomycin 125mg Q 6h    # Dysphagia   puree with no liquids  - SLP evaluation noted    # fungemia: 9 candida galbrata  - c/w caspofungin  - ID consult appreciated    # Atrial Fibrillation  - Metoprolol 25mg q6h  - No anticoagulation given ICH    # ICH s/p craniotomy and cranioplasty  abnormal EEG with risk of seizures  - Vimpat 150mg q12h    # anemia: likely multifactorial    s/p PRBC    c/w PPI,     no intervention per GI.    DVT ppx  - SCD 83y M with PMH HTN, CAD s/p stents, renal cell carcinoma status post left nephrectomy, bladder CA, BPH, CHF, LBBB, vertigo,  HLD, 5.5cm AAA without rupture post EVAR, paroxysmal A-fib on Eliquis, Plavix, and aspirin, early stage Alzheimer dementia transferred from [facility] s/p unwitnessed fall with head strike at home found by wife on floor at 8am. Patient brought to urgent care by wife and had 5 staples to posterior scalp but was instructed to go to nearest ED.  CT head at [place] showed R frontal IPH, SDH, SAH at 9:00. CTA stroke protocol not performed at [facility]. Patient BIB on 6L NC.  Pt GCS 15 on arrival, A&Ox2 on arrival. After initial assessment, patient noted to be vomiting enroute to CT scanner.   admitted 11/10 with prolonged hospitalization here.  being found to have right frontal IPH, 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  Extubated 11/13. Course complicated by Acute Kidney Injury, afib, EEG with epileptiform discharges. Patient was trasnferred to step down unit 11/23. TTE had shown possible vegetation on valve but repeat was not diagnostic and blood cultures were negative. Course further complicated by deep vein thrombosis or LUE brachial veins. Patient course further complicated by stroke found on MRI, WHIT recommended but family declined. He subsequently underwent cranioplasty on 12/7, drain removed 12/10. He had worsening of brain bleed 12/15, taken off anticoagulation and reversed. Patient then had acute decompensation on 12/21 for worsening mental status, vomiting, aspiration, and shock, requiring transfer to Intensive care unit for intubation, and pressors. Further complications including acute blood loss anemia requiring PRBCs, klebsiella pneumonia afib with RVR. He is now s/p extubation 12/25.      # Encephalopathy, multifactorial. ICH, CVA, ICU, possible delirium  Waxing, waning  - frequent reorientation  - Neurology signed off    # aspiration pneumonia  - Aspiration precautions  - Monitor for hypoxia  - s/p Meropenem  - ID follow up     # hypokalemia  - K 3.0  - Replete  - Monitor BMP  - maintain mg >2, phos >4, K >4     # C diff   - c/w vancomycin 125mg Q 6h    # Dysphagia   puree with no liquids  - SLP evaluation noted    # fungemia: 9 candida galbrata  - c/w caspofungin  - ID consult appreciated    # Atrial Fibrillation  - Metoprolol 25mg q6h  - No anticoagulation given ICH    # ICH s/p craniotomy and cranioplasty  abnormal EEG with risk of seizures  - Vimpat 150mg q12h    # anemia: likely multifactorial    s/p PRBC    c/w PPI,     no intervention per GI.    DVT ppx  - SCD

## 2024-01-03 NOTE — PROCEDURE NOTE - NSPROCNAME_GEN_A_CORE
Midline Insertion
General
Central Line Insertion
Lumbar Puncture
Midline Insertion
Arterial Puncture/Cannulation
Arterial Puncture/Cannulation
Peripheral Line Insertion
Tracheal Intubation

## 2024-01-04 LAB
ANION GAP SERPL CALC-SCNC: 10 MMOL/L — SIGNIFICANT CHANGE UP (ref 5–17)
ANION GAP SERPL CALC-SCNC: 10 MMOL/L — SIGNIFICANT CHANGE UP (ref 5–17)
BASOPHILS # BLD AUTO: 0.04 K/UL — SIGNIFICANT CHANGE UP (ref 0–0.2)
BASOPHILS # BLD AUTO: 0.04 K/UL — SIGNIFICANT CHANGE UP (ref 0–0.2)
BASOPHILS NFR BLD AUTO: 0.4 % — SIGNIFICANT CHANGE UP (ref 0–2)
BASOPHILS NFR BLD AUTO: 0.4 % — SIGNIFICANT CHANGE UP (ref 0–2)
BUN SERPL-MCNC: 14.8 MG/DL — SIGNIFICANT CHANGE UP (ref 8–20)
BUN SERPL-MCNC: 14.8 MG/DL — SIGNIFICANT CHANGE UP (ref 8–20)
CALCIUM SERPL-MCNC: 8.6 MG/DL — SIGNIFICANT CHANGE UP (ref 8.4–10.5)
CALCIUM SERPL-MCNC: 8.6 MG/DL — SIGNIFICANT CHANGE UP (ref 8.4–10.5)
CHLORIDE SERPL-SCNC: 104 MMOL/L — SIGNIFICANT CHANGE UP (ref 96–108)
CHLORIDE SERPL-SCNC: 104 MMOL/L — SIGNIFICANT CHANGE UP (ref 96–108)
CO2 SERPL-SCNC: 23 MMOL/L — SIGNIFICANT CHANGE UP (ref 22–29)
CO2 SERPL-SCNC: 23 MMOL/L — SIGNIFICANT CHANGE UP (ref 22–29)
CREAT SERPL-MCNC: 0.81 MG/DL — SIGNIFICANT CHANGE UP (ref 0.5–1.3)
CREAT SERPL-MCNC: 0.81 MG/DL — SIGNIFICANT CHANGE UP (ref 0.5–1.3)
EGFR: 88 ML/MIN/1.73M2 — SIGNIFICANT CHANGE UP
EGFR: 88 ML/MIN/1.73M2 — SIGNIFICANT CHANGE UP
EOSINOPHIL # BLD AUTO: 0.08 K/UL — SIGNIFICANT CHANGE UP (ref 0–0.5)
EOSINOPHIL # BLD AUTO: 0.08 K/UL — SIGNIFICANT CHANGE UP (ref 0–0.5)
EOSINOPHIL NFR BLD AUTO: 0.8 % — SIGNIFICANT CHANGE UP (ref 0–6)
EOSINOPHIL NFR BLD AUTO: 0.8 % — SIGNIFICANT CHANGE UP (ref 0–6)
GLUCOSE SERPL-MCNC: 98 MG/DL — SIGNIFICANT CHANGE UP (ref 70–99)
GLUCOSE SERPL-MCNC: 98 MG/DL — SIGNIFICANT CHANGE UP (ref 70–99)
H CAPSUL AG CSF IA-MCNC: SIGNIFICANT CHANGE UP
H CAPSUL AG CSF IA-MCNC: SIGNIFICANT CHANGE UP
HCT VFR BLD CALC: 28.1 % — LOW (ref 39–50)
HCT VFR BLD CALC: 28.1 % — LOW (ref 39–50)
HGB BLD-MCNC: 9.6 G/DL — LOW (ref 13–17)
HGB BLD-MCNC: 9.6 G/DL — LOW (ref 13–17)
IMM GRANULOCYTES NFR BLD AUTO: 0.5 % — SIGNIFICANT CHANGE UP (ref 0–0.9)
IMM GRANULOCYTES NFR BLD AUTO: 0.5 % — SIGNIFICANT CHANGE UP (ref 0–0.9)
LYMPHOCYTES # BLD AUTO: 1.42 K/UL — SIGNIFICANT CHANGE UP (ref 1–3.3)
LYMPHOCYTES # BLD AUTO: 1.42 K/UL — SIGNIFICANT CHANGE UP (ref 1–3.3)
LYMPHOCYTES # BLD AUTO: 14.1 % — SIGNIFICANT CHANGE UP (ref 13–44)
LYMPHOCYTES # BLD AUTO: 14.1 % — SIGNIFICANT CHANGE UP (ref 13–44)
MCHC RBC-ENTMCNC: 31.3 PG — SIGNIFICANT CHANGE UP (ref 27–34)
MCHC RBC-ENTMCNC: 31.3 PG — SIGNIFICANT CHANGE UP (ref 27–34)
MCHC RBC-ENTMCNC: 34.2 GM/DL — SIGNIFICANT CHANGE UP (ref 32–36)
MCHC RBC-ENTMCNC: 34.2 GM/DL — SIGNIFICANT CHANGE UP (ref 32–36)
MCV RBC AUTO: 91.5 FL — SIGNIFICANT CHANGE UP (ref 80–100)
MCV RBC AUTO: 91.5 FL — SIGNIFICANT CHANGE UP (ref 80–100)
MONOCYTES # BLD AUTO: 0.54 K/UL — SIGNIFICANT CHANGE UP (ref 0–0.9)
MONOCYTES # BLD AUTO: 0.54 K/UL — SIGNIFICANT CHANGE UP (ref 0–0.9)
MONOCYTES NFR BLD AUTO: 5.4 % — SIGNIFICANT CHANGE UP (ref 2–14)
MONOCYTES NFR BLD AUTO: 5.4 % — SIGNIFICANT CHANGE UP (ref 2–14)
NEUTROPHILS # BLD AUTO: 7.92 K/UL — HIGH (ref 1.8–7.4)
NEUTROPHILS # BLD AUTO: 7.92 K/UL — HIGH (ref 1.8–7.4)
NEUTROPHILS NFR BLD AUTO: 78.8 % — HIGH (ref 43–77)
NEUTROPHILS NFR BLD AUTO: 78.8 % — HIGH (ref 43–77)
PLATELET # BLD AUTO: 281 K/UL — SIGNIFICANT CHANGE UP (ref 150–400)
PLATELET # BLD AUTO: 281 K/UL — SIGNIFICANT CHANGE UP (ref 150–400)
POTASSIUM SERPL-MCNC: 4.3 MMOL/L — SIGNIFICANT CHANGE UP (ref 3.5–5.3)
POTASSIUM SERPL-MCNC: 4.3 MMOL/L — SIGNIFICANT CHANGE UP (ref 3.5–5.3)
POTASSIUM SERPL-SCNC: 4.3 MMOL/L — SIGNIFICANT CHANGE UP (ref 3.5–5.3)
POTASSIUM SERPL-SCNC: 4.3 MMOL/L — SIGNIFICANT CHANGE UP (ref 3.5–5.3)
RBC # BLD: 3.07 M/UL — LOW (ref 4.2–5.8)
RBC # BLD: 3.07 M/UL — LOW (ref 4.2–5.8)
RBC # FLD: 14.6 % — HIGH (ref 10.3–14.5)
RBC # FLD: 14.6 % — HIGH (ref 10.3–14.5)
SODIUM SERPL-SCNC: 137 MMOL/L — SIGNIFICANT CHANGE UP (ref 135–145)
SODIUM SERPL-SCNC: 137 MMOL/L — SIGNIFICANT CHANGE UP (ref 135–145)
WBC # BLD: 10.05 K/UL — SIGNIFICANT CHANGE UP (ref 3.8–10.5)
WBC # BLD: 10.05 K/UL — SIGNIFICANT CHANGE UP (ref 3.8–10.5)
WBC # FLD AUTO: 10.05 K/UL — SIGNIFICANT CHANGE UP (ref 3.8–10.5)
WBC # FLD AUTO: 10.05 K/UL — SIGNIFICANT CHANGE UP (ref 3.8–10.5)

## 2024-01-04 PROCEDURE — 99232 SBSQ HOSP IP/OBS MODERATE 35: CPT

## 2024-01-04 RX ADMIN — Medication 25 MILLIGRAM(S): at 00:13

## 2024-01-04 RX ADMIN — Medication 125 MILLIGRAM(S): at 00:15

## 2024-01-04 RX ADMIN — Medication 25 MILLIGRAM(S): at 12:56

## 2024-01-04 RX ADMIN — Medication 125 MILLIGRAM(S): at 12:55

## 2024-01-04 RX ADMIN — CASPOFUNGIN ACETATE 260 MILLIGRAM(S): 7 INJECTION, POWDER, LYOPHILIZED, FOR SOLUTION INTRAVENOUS at 13:08

## 2024-01-04 RX ADMIN — Medication 1 MILLIGRAM(S): at 12:56

## 2024-01-04 RX ADMIN — PANTOPRAZOLE SODIUM 40 MILLIGRAM(S): 20 TABLET, DELAYED RELEASE ORAL at 05:10

## 2024-01-04 RX ADMIN — Medication 125 MILLIGRAM(S): at 19:09

## 2024-01-04 RX ADMIN — CHLORHEXIDINE GLUCONATE 1 APPLICATION(S): 213 SOLUTION TOPICAL at 05:10

## 2024-01-04 RX ADMIN — Medication 25 MILLIGRAM(S): at 05:10

## 2024-01-04 RX ADMIN — Medication 25 MILLIGRAM(S): at 19:16

## 2024-01-04 RX ADMIN — Medication 125 MILLIGRAM(S): at 05:08

## 2024-01-04 RX ADMIN — LACOSAMIDE 130 MILLIGRAM(S): 50 TABLET ORAL at 05:08

## 2024-01-04 RX ADMIN — PANTOPRAZOLE SODIUM 40 MILLIGRAM(S): 20 TABLET, DELAYED RELEASE ORAL at 19:15

## 2024-01-04 RX ADMIN — Medication 1 TABLET(S): at 13:08

## 2024-01-04 NOTE — PROGRESS NOTE ADULT - ASSESSMENT
83y M with PMH HTN, CAD s/p stents, renal cell carcinoma status post left nephrectomy, bladder CA, BPH, CHF, LBBB, vertigo,  HLD, 5.5cm AAA without rupture post EVAR, paroxysmal A-fib on Eliquis, Plavix, and aspirin, early stage Alzheimer dementia transferred from [facility] s/p unwitnessed fall with head strike at home found by wife on floor at 8am. Patient brought to urgent care by wife and had 5 staples to posterior scalp but was instructed to go to nearest ED.  CT head at [place] showed R frontal IPH, SDH, SAH at 9:00. CTA stroke protocol not performed at [facility]. Patient BIB on 6L NC.  Pt GCS 15 on arrival, A&Ox2 on arrival. After initial assessment, patient noted to be vomiting enroute to CT scanner.   admitted 11/10 with prolonged hospitalization here.  being found to have right frontal IPH, 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  Extubated 11/13. Course complicated by Acute Kidney Injury, afib, EEG with epileptiform discharges. Patient was trasnferred to step down unit 11/23. TTE had shown possible vegetation on valve but repeat was not diagnostic and blood cultures were negative. Course further complicated by deep vein thrombosis or LUE brachial veins. Patient course further complicated by stroke found on MRI, WHIT recommended but family declined. He subsequently underwent cranioplasty on 12/7, drain removed 12/10. He had worsening of brain bleed 12/15, taken off anticoagulation and reversed. Patient then had acute decompensation on 12/21 for worsening mental status, vomiting, aspiration, and shock, requiring transfer to Intensive care unit for intubation, and pressors. Further complications including acute blood loss anemia requiring PRBCs, klebsiella pneumonia afib with RVR. He is now s/p extubation 12/25.      # Encephalopathy, multifactorial. ICH, CVA, ICU, possible delirium  Waxing, waning. oriented x 1 today      # hypokalemia  resolved     # C diff   - diarrhea improved. c/w vancomycin 125mg Q 6h    # Dysphagia   puree with no liquids. need repeat SLP evaluation      # fungemia 2/2 candida galbrata  - c/w caspofungin  - ID following    # Atrial Fibrillation  - Metoprolol 25mg q6h  - No anticoagulation given ICH    # ICH s/p craniotomy and cranioplasty  abnormal EEG with risk of seizures  - Vimpat 150mg q12h    # anemia: likely multifactorial    s/p PRBC    c/w PPI,     no intervention per GI.    # aspiration pneumonia  resolved  - s/p Meropenem    DVT ppx  - SCD    Dispo; [facility] 83y M with PMH HTN, CAD s/p stents, renal cell carcinoma status post left nephrectomy, bladder CA, BPH, CHF, LBBB, vertigo,  HLD, 5.5cm AAA without rupture post EVAR, paroxysmal A-fib on Eliquis, Plavix, and aspirin, early stage Alzheimer dementia transferred from [facility] s/p unwitnessed fall with head strike at home found by wife on floor at 8am. Patient brought to urgent care by wife and had 5 staples to posterior scalp but was instructed to go to nearest ED.  CT head at [place] showed R frontal IPH, SDH, SAH at 9:00. CTA stroke protocol not performed at [facility]. Patient BIB on 6L NC.  Pt GCS 15 on arrival, A&Ox2 on arrival. After initial assessment, patient noted to be vomiting enroute to CT scanner.   admitted 11/10 with prolonged hospitalization here.  being found to have right frontal IPH, 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  Extubated 11/13. Course complicated by Acute Kidney Injury, afib, EEG with epileptiform discharges. Patient was trasnferred to step down unit 11/23. TTE had shown possible vegetation on valve but repeat was not diagnostic and blood cultures were negative. Course further complicated by deep vein thrombosis or LUE brachial veins. Patient course further complicated by stroke found on MRI, WHIT recommended but family declined. He subsequently underwent cranioplasty on 12/7, drain removed 12/10. He had worsening of brain bleed 12/15, taken off anticoagulation and reversed. Patient then had acute decompensation on 12/21 for worsening mental status, vomiting, aspiration, and shock, requiring transfer to Intensive care unit for intubation, and pressors. Further complications including acute blood loss anemia requiring PRBCs, klebsiella pneumonia afib with RVR. He is now s/p extubation 12/25.      # Encephalopathy, multifactorial. ICH, CVA, ICU, possible delirium  Waxing, waning. oriented x 1 today      # hypokalemia  resolved     # C diff   - diarrhea improved. c/w vancomycin 125mg Q 6h    # Dysphagia   puree with no liquids. need repeat SLP evaluation      # fungemia 2/2 candida galbrata  - c/w caspofungin  - ID following    # Atrial Fibrillation  - Metoprolol 25mg q6h  - No anticoagulation given ICH    # ICH s/p craniotomy and cranioplasty  abnormal EEG with risk of seizures  - Vimpat 150mg q12h    # anemia: likely multifactorial    s/p PRBC    c/w PPI,     no intervention per GI.    # aspiration pneumonia  resolved  - s/p Meropenem    DVT ppx  - SCD    Dispo; [facility]

## 2024-01-04 NOTE — PROGRESS NOTE ADULT - SUBJECTIVE AND OBJECTIVE BOX
[facility] Division of Hospital Medicine    Chief Complaint:      SUBJECTIVE / OVERNIGHT EVENTS:    Patient denies any complaints    MEDICATIONS  (STANDING):  atorvastatin 80 milliGRAM(s) Oral at bedtime  caspofungin IVPB      caspofungin IVPB 50 milliGRAM(s) IV Intermittent every 24 hours  chlorhexidine 2% Cloths 1 Application(s) Topical <User Schedule>  doxazosin 2 milliGRAM(s) Oral at bedtime  folic acid 1 milliGRAM(s) Oral daily  lacosamide IVPB 150 milliGRAM(s) IV Intermittent every 12 hours  metoprolol tartrate 25 milliGRAM(s) Oral every 6 hours  Nephro-roma 1 Tablet(s) Oral daily  pantoprazole  Injectable 40 milliGRAM(s) IV Push two times a day  vancomycin    Solution 125 milliGRAM(s) Oral every 6 hours    MEDICATIONS  (PRN):  acetaminophen     Tablet .. 650 milliGRAM(s) Oral every 6 hours PRN Temp greater or equal to 38C (100.4F), Mild Pain (1 - 3)  artificial tears (preservative free) Ophthalmic Solution 1 Drop(s) Both EYES every 6 hours PRN Dry Eyes        I&O's Summary    03 Jan 2024 07:01  -  04 Jan 2024 07:00  --------------------------------------------------------  IN: 180 mL / OUT: 2400 mL / NET: -2220 mL    04 Jan 2024 07:01  -  04 Jan 2024 14:54  --------------------------------------------------------  IN: 0 mL / OUT: 850 mL / NET: -850 mL        PHYSICAL EXAM:  Vital Signs Last 24 Hrs  T(C): 36.6 (04 Jan 2024 12:00), Max: 37.2 (04 Jan 2024 00:00)  T(F): 97.8 (04 Jan 2024 12:00), Max: 98.9 (04 Jan 2024 00:00)  HR: 105 (04 Jan 2024 13:00) (88 - 112)  BP: 140/82 (04 Jan 2024 13:00) (108/67 - 168/84)  BP(mean): --  RR: 16 (04 Jan 2024 13:00) (16 - 18)  SpO2: 95% (04 Jan 2024 13:00) (91% - 95%)    Parameters below as of 04 Jan 2024 13:00  Patient On (Oxygen Delivery Method): room air            CONSTITUTIONAL: NAD,   ENMT: healed surgical scar  RESPIRATORY: Normal respiratory effort; lungs are clear to auscultation bilaterally  CARDIOVASCULAR: Regular rate and rhythm, normal S1 and S2, no murmur/rub/gallop.  ABDOMEN: Nontender to palpation, no rebound/guarding; No hepatosplenomegaly  MUSCLOSKELETAL:  No edema  NEUROLOGY: awake, oriented x 1. left hemiplegia       LABS:                        9.6    10.05 )-----------( 281      ( 04 Jan 2024 08:10 )             28.1     01-04    137  |  104  |  14.8  ----------------------------<  98  4.3   |  23.0  |  0.81    Ca    8.6      04 Jan 2024 08:10            Urinalysis Basic - ( 04 Jan 2024 08:10 )    Color: x / Appearance: x / SG: x / pH: x  Gluc: 98 mg/dL / Ketone: x  / Bili: x / Urobili: x   Blood: x / Protein: x / Nitrite: x   Leuk Esterase: x / RBC: x / WBC x   Sq Epi: x / Non Sq Epi: x / Bacteria: x        CAPILLARY BLOOD GLUCOSE            RADIOLOGY & ADDITIONAL TESTS:  Results Reviewed:   Imaging Personally Reviewed:  Electrocardiogram Personally Reviewed:                                           [facility] Division of Hospital Medicine    Chief Complaint:      SUBJECTIVE / OVERNIGHT EVENTS:    Patient denies any complaints    MEDICATIONS  (STANDING):  atorvastatin 80 milliGRAM(s) Oral at bedtime  caspofungin IVPB      caspofungin IVPB 50 milliGRAM(s) IV Intermittent every 24 hours  chlorhexidine 2% Cloths 1 Application(s) Topical <User Schedule>  doxazosin 2 milliGRAM(s) Oral at bedtime  folic acid 1 milliGRAM(s) Oral daily  lacosamide IVPB 150 milliGRAM(s) IV Intermittent every 12 hours  metoprolol tartrate 25 milliGRAM(s) Oral every 6 hours  Nephro-roma 1 Tablet(s) Oral daily  pantoprazole  Injectable 40 milliGRAM(s) IV Push two times a day  vancomycin    Solution 125 milliGRAM(s) Oral every 6 hours    MEDICATIONS  (PRN):  acetaminophen     Tablet .. 650 milliGRAM(s) Oral every 6 hours PRN Temp greater or equal to 38C (100.4F), Mild Pain (1 - 3)  artificial tears (preservative free) Ophthalmic Solution 1 Drop(s) Both EYES every 6 hours PRN Dry Eyes        I&O's Summary    03 Jan 2024 07:01  -  04 Jan 2024 07:00  --------------------------------------------------------  IN: 180 mL / OUT: 2400 mL / NET: -2220 mL    04 Jan 2024 07:01  -  04 Jan 2024 14:54  --------------------------------------------------------  IN: 0 mL / OUT: 850 mL / NET: -850 mL        PHYSICAL EXAM:  Vital Signs Last 24 Hrs  T(C): 36.6 (04 Jan 2024 12:00), Max: 37.2 (04 Jan 2024 00:00)  T(F): 97.8 (04 Jan 2024 12:00), Max: 98.9 (04 Jan 2024 00:00)  HR: 105 (04 Jan 2024 13:00) (88 - 112)  BP: 140/82 (04 Jan 2024 13:00) (108/67 - 168/84)  BP(mean): --  RR: 16 (04 Jan 2024 13:00) (16 - 18)  SpO2: 95% (04 Jan 2024 13:00) (91% - 95%)    Parameters below as of 04 Jan 2024 13:00  Patient On (Oxygen Delivery Method): room air            CONSTITUTIONAL: NAD,   ENMT: healed surgical scar  RESPIRATORY: Normal respiratory effort; lungs are clear to auscultation bilaterally  CARDIOVASCULAR: Regular rate and rhythm, normal S1 and S2, no murmur/rub/gallop.  ABDOMEN: Nontender to palpation, no rebound/guarding; No hepatosplenomegaly  MUSCLOSKELETAL:  No edema  NEUROLOGY: awake, oriented x 1. left hemiplegia       LABS:                        9.6    10.05 )-----------( 281      ( 04 Jan 2024 08:10 )             28.1     01-04    137  |  104  |  14.8  ----------------------------<  98  4.3   |  23.0  |  0.81    Ca    8.6      04 Jan 2024 08:10            Urinalysis Basic - ( 04 Jan 2024 08:10 )    Color: x / Appearance: x / SG: x / pH: x  Gluc: 98 mg/dL / Ketone: x  / Bili: x / Urobili: x   Blood: x / Protein: x / Nitrite: x   Leuk Esterase: x / RBC: x / WBC x   Sq Epi: x / Non Sq Epi: x / Bacteria: x        CAPILLARY BLOOD GLUCOSE            RADIOLOGY & ADDITIONAL TESTS:  Results Reviewed:   Imaging Personally Reviewed:  Electrocardiogram Personally Reviewed:

## 2024-01-05 PROCEDURE — 99233 SBSQ HOSP IP/OBS HIGH 50: CPT | Mod: GC

## 2024-01-05 PROCEDURE — 99232 SBSQ HOSP IP/OBS MODERATE 35: CPT

## 2024-01-05 RX ORDER — LACOSAMIDE 50 MG/1
50 TABLET ORAL
Refills: 0 | Status: DISCONTINUED | OUTPATIENT
Start: 2024-01-05 | End: 2024-01-08

## 2024-01-05 RX ADMIN — Medication 125 MILLIGRAM(S): at 00:21

## 2024-01-05 RX ADMIN — CHLORHEXIDINE GLUCONATE 1 APPLICATION(S): 213 SOLUTION TOPICAL at 06:09

## 2024-01-05 RX ADMIN — Medication 1 TABLET(S): at 11:32

## 2024-01-05 RX ADMIN — Medication 25 MILLIGRAM(S): at 06:09

## 2024-01-05 RX ADMIN — PANTOPRAZOLE SODIUM 40 MILLIGRAM(S): 20 TABLET, DELAYED RELEASE ORAL at 06:10

## 2024-01-05 RX ADMIN — Medication 125 MILLIGRAM(S): at 11:32

## 2024-01-05 RX ADMIN — Medication 1 MILLIGRAM(S): at 11:33

## 2024-01-05 RX ADMIN — Medication 25 MILLIGRAM(S): at 17:30

## 2024-01-05 RX ADMIN — CASPOFUNGIN ACETATE 260 MILLIGRAM(S): 7 INJECTION, POWDER, LYOPHILIZED, FOR SOLUTION INTRAVENOUS at 12:48

## 2024-01-05 RX ADMIN — LACOSAMIDE 50 MILLIGRAM(S): 50 TABLET ORAL at 17:30

## 2024-01-05 RX ADMIN — Medication 25 MILLIGRAM(S): at 11:32

## 2024-01-05 RX ADMIN — PANTOPRAZOLE SODIUM 40 MILLIGRAM(S): 20 TABLET, DELAYED RELEASE ORAL at 17:30

## 2024-01-05 RX ADMIN — Medication 125 MILLIGRAM(S): at 17:30

## 2024-01-05 RX ADMIN — Medication 25 MILLIGRAM(S): at 00:21

## 2024-01-05 RX ADMIN — Medication 125 MILLIGRAM(S): at 06:09

## 2024-01-05 NOTE — PROGRESS NOTE ADULT - SUBJECTIVE AND OBJECTIVE BOX
Patient is a 83y old  Male who presents with a chief complaint of s/p unwitnessed fall with head strike (04 Jan 2024 14:54)      INTERVAL HPI/OVERNIGHT EVENTS:  No acute events reported overnight.    TODAY:    MEDICATIONS  (STANDING):  atorvastatin 80 milliGRAM(s) Oral at bedtime  caspofungin IVPB 50 milliGRAM(s) IV Intermittent every 24 hours  caspofungin IVPB      chlorhexidine 2% Cloths 1 Application(s) Topical <User Schedule>  doxazosin 2 milliGRAM(s) Oral at bedtime  folic acid 1 milliGRAM(s) Oral daily  lacosamide IVPB 150 milliGRAM(s) IV Intermittent every 12 hours  metoprolol tartrate 25 milliGRAM(s) Oral every 6 hours  Nephro-roma 1 Tablet(s) Oral daily  pantoprazole  Injectable 40 milliGRAM(s) IV Push two times a day  vancomycin    Solution 125 milliGRAM(s) Oral every 6 hours    MEDICATIONS  (PRN):  acetaminophen     Tablet .. 650 milliGRAM(s) Oral every 6 hours PRN Temp greater or equal to 38C (100.4F), Mild Pain (1 - 3)  artificial tears (preservative free) Ophthalmic Solution 1 Drop(s) Both EYES every 6 hours PRN Dry Eyes      Allergies    penicillin (Rash)  heparin (Other (Severe))  penicillin (Hives)  Cipro (Other)  Levaquin (Other)  heparin (Other)    Intolerances        REVIEW OF SYSTEMS:      Vital Signs Last 24 Hrs  T(C): 36.7 (05 Jan 2024 04:38), Max: 37.2 (05 Jan 2024 00:00)  T(F): 98 (05 Jan 2024 04:38), Max: 99 (05 Jan 2024 00:00)  HR: 90 (05 Jan 2024 04:38) (88 - 105)  BP: 132/71 (05 Jan 2024 04:38) (105/74 - 168/84)  BP(mean): --  RR: 18 (05 Jan 2024 04:38) (16 - 18)  SpO2: 92% (05 Jan 2024 04:38) (92% - 98%)    Parameters below as of 05 Jan 2024 04:38  Patient On (Oxygen Delivery Method): room air        PHYSICAL EXAM:      LABS:                        9.6    10.05 )-----------( 281      ( 04 Jan 2024 08:10 )             28.1     01-04    137  |  104  |  14.8  ----------------------------<  98  4.3   |  23.0  |  0.81    Ca    8.6      04 Jan 2024 08:10        Urinalysis Basic - ( 04 Jan 2024 08:10 )    Color: x / Appearance: x / SG: x / pH: x  Gluc: 98 mg/dL / Ketone: x  / Bili: x / Urobili: x   Blood: x / Protein: x / Nitrite: x   Leuk Esterase: x / RBC: x / WBC x   Sq Epi: x / Non Sq Epi: x / Bacteria: x      CAPILLARY BLOOD GLUCOSE          RADIOLOGY & ADDITIONAL TESTS:    Imaging Personally Reviewed:  [ ] YES  [ ] NO    Consultant(s) Notes Reviewed:  [ ] YES  [ ] NO    Care Discussed with Consultants/Other Providers [ ] YES  [ ] NO    Plan of Care discussed with Housestaff [ ]YES [ ] NO Patient is a 83y old  Male who presents with a chief complaint of s/p unwitnessed fall with head strike (04 Jan 2024 14:54)      INTERVAL HPI/OVERNIGHT EVENTS:  No acute events reported overnight.    TODAY:    MEDICATIONS  (STANDING):  atorvastatin 80 milliGRAM(s) Oral at bedtime  caspofungin IVPB 50 milliGRAM(s) IV Intermittent every 24 hours  caspofungin IVPB      chlorhexidine 2% Cloths 1 Application(s) Topical <User Schedule>  doxazosin 2 milliGRAM(s) Oral at bedtime  folic acid 1 milliGRAM(s) Oral daily  lacosamide IVPB 150 milliGRAM(s) IV Intermittent every 12 hours  metoprolol tartrate 25 milliGRAM(s) Oral every 6 hours  Nephro-roma 1 Tablet(s) Oral daily  pantoprazole  Injectable 40 milliGRAM(s) IV Push two times a day  vancomycin    Solution 125 milliGRAM(s) Oral every 6 hours    MEDICATIONS  (PRN):  acetaminophen     Tablet .. 650 milliGRAM(s) Oral every 6 hours PRN Temp greater or equal to 38C (100.4F), Mild Pain (1 - 3)  artificial tears (preservative free) Ophthalmic Solution 1 Drop(s) Both EYES every 6 hours PRN Dry Eyes      Allergies    penicillin (Rash)  heparin (Other (Severe))  penicillin (Hives)  Cipro (Other)  Levaquin (Other)  heparin (Other)    Intolerances        REVIEW OF SYSTEMS:      Vital Signs Last 24 Hrs  T(C): 36.7 (05 Jan 2024 04:38), Max: 37.2 (05 Jan 2024 00:00)  T(F): 98 (05 Jan 2024 04:38), Max: 99 (05 Jan 2024 00:00)  HR: 90 (05 Jan 2024 04:38) (88 - 105)  BP: 132/71 (05 Jan 2024 04:38) (105/74 - 168/84)  BP(mean): --  RR: 18 (05 Jan 2024 04:38) (16 - 18)  SpO2: 92% (05 Jan 2024 04:38) (92% - 98%)    Parameters below as of 05 Jan 2024 04:38  Patient On (Oxygen Delivery Method): room air        PHYSICAL EXAM:  CONSTITUTIONAL: NAD, A&O x1, somnolent arousable to voice  ENMT: healed surgical scar  RESPIRATORY: Normal respiratory effort; lungs are clear to auscultation bilaterally  CARDIOVASCULAR: Regular rate and rhythm, normal S1 and S2, no murmur/rub/gallop.  ABDOMEN: Nontender to palpation, no rebound/guarding; No hepatosplenomegaly  MUSCLOSKELETAL:  No edema  NEUROLOGY: Awake, oriented x 1. Left sided hemiplegia         LABS:                        9.6    10.05 )-----------( 281      ( 04 Jan 2024 08:10 )             28.1     01-04    137  |  104  |  14.8  ----------------------------<  98  4.3   |  23.0  |  0.81    Ca    8.6      04 Jan 2024 08:10        Urinalysis Basic - ( 04 Jan 2024 08:10 )    Color: x / Appearance: x / SG: x / pH: x  Gluc: 98 mg/dL / Ketone: x  / Bili: x / Urobili: x   Blood: x / Protein: x / Nitrite: x   Leuk Esterase: x / RBC: x / WBC x   Sq Epi: x / Non Sq Epi: x / Bacteria: x      CAPILLARY BLOOD GLUCOSE          RADIOLOGY & ADDITIONAL TESTS:    Imaging Personally Reviewed:  [ ] YES  [ ] NO    Consultant(s) Notes Reviewed:  [ ] YES  [ ] NO    Care Discussed with Consultants/Other Providers [ ] YES  [ ] NO    Plan of Care discussed with Housestaff [ ]YES [ ] NO Patient is a 83y old  Male who presents with a chief complaint of s/p unwitnessed fall with head strike (04 Jan 2024 14:54)      INTERVAL HPI/OVERNIGHT EVENTS:  No acute events reported overnight. awake, mildly lethargic , follows simple commands     TODAY:    MEDICATIONS  (STANDING):  atorvastatin 80 milliGRAM(s) Oral at bedtime  caspofungin IVPB 50 milliGRAM(s) IV Intermittent every 24 hours  caspofungin IVPB      chlorhexidine 2% Cloths 1 Application(s) Topical <User Schedule>  doxazosin 2 milliGRAM(s) Oral at bedtime  folic acid 1 milliGRAM(s) Oral daily  lacosamide IVPB 150 milliGRAM(s) IV Intermittent every 12 hours  metoprolol tartrate 25 milliGRAM(s) Oral every 6 hours  Nephro-roma 1 Tablet(s) Oral daily  pantoprazole  Injectable 40 milliGRAM(s) IV Push two times a day  vancomycin    Solution 125 milliGRAM(s) Oral every 6 hours    MEDICATIONS  (PRN):  acetaminophen     Tablet .. 650 milliGRAM(s) Oral every 6 hours PRN Temp greater or equal to 38C (100.4F), Mild Pain (1 - 3)  artificial tears (preservative free) Ophthalmic Solution 1 Drop(s) Both EYES every 6 hours PRN Dry Eyes      Allergies    penicillin (Rash)  heparin (Other (Severe))  penicillin (Hives)  Cipro (Other)  Levaquin (Other)  heparin (Other)    Intolerances        REVIEW OF SYSTEMS:      Vital Signs Last 24 Hrs  T(C): 36.7 (05 Jan 2024 04:38), Max: 37.2 (05 Jan 2024 00:00)  T(F): 98 (05 Jan 2024 04:38), Max: 99 (05 Jan 2024 00:00)  HR: 90 (05 Jan 2024 04:38) (88 - 105)  BP: 132/71 (05 Jan 2024 04:38) (105/74 - 168/84)  BP(mean): --  RR: 18 (05 Jan 2024 04:38) (16 - 18)  SpO2: 92% (05 Jan 2024 04:38) (92% - 98%)    Parameters below as of 05 Jan 2024 04:38  Patient On (Oxygen Delivery Method): room air        PHYSICAL EXAM:  CONSTITUTIONAL: NAD, A&O x1, somnolent arousable to voice  ENMT: healed surgical scar  RESPIRATORY: Normal respiratory effort; lungs are clear to auscultation bilaterally  CARDIOVASCULAR: Regular rate and rhythm, normal S1 and S2, no murmur/rub/gallop.  ABDOMEN: Nontender to palpation, no rebound/guarding; No hepatosplenomegaly  MUSCLOSKELETAL:  No edema  NEUROLOGY: Awake, oriented x 1. Left sided hemiplegia   skin : stage 2 sacral decub         LABS:                        9.6    10.05 )-----------( 281      ( 04 Jan 2024 08:10 )             28.1     01-04    137  |  104  |  14.8  ----------------------------<  98  4.3   |  23.0  |  0.81    Ca    8.6      04 Jan 2024 08:10        Urinalysis Basic - ( 04 Jan 2024 08:10 )    Color: x / Appearance: x / SG: x / pH: x  Gluc: 98 mg/dL / Ketone: x  / Bili: x / Urobili: x   Blood: x / Protein: x / Nitrite: x   Leuk Esterase: x / RBC: x / WBC x   Sq Epi: x / Non Sq Epi: x / Bacteria: x      CAPILLARY BLOOD GLUCOSE          RADIOLOGY & ADDITIONAL TESTS:    Imaging Personally Reviewed:  [ ] YES  [ ] NO    Consultant(s) Notes Reviewed:  [ ] YES  [ ] NO    Care Discussed with Consultants/Other Providers [ ] YES  [ ] NO    Plan of Care discussed with Housestaff [ ]YES [ ] NO

## 2024-01-05 NOTE — PROGRESS NOTE ADULT - SUBJECTIVE AND OBJECTIVE BOX
NS PA Note    HPI  82y M with PMH HTN, CAD s/p stents, renal cell carcinoma status post left nephrectomy, bladder CA, BPH, CHF, LBBB, vertigo,  HLD, 5.5cm AAA without rupture post EVAR, paroxysmal A-fib on Eliquis, Plavix, and aspirin, early stage Alzheimer dementia transferred from [facility] s/p unwitnessed fall with head strike at home found by wife on floor at 8am. Patient brought to urgent care by wife and had 5 staples to posterior scalp but was instructed to go to nearest ED.  CT head at [place] showed R frontal IPH, SDH, SAH at 9:00. CTA stroke protocol not performed at [facility]. Patient BIB on 6L NC.  Pt GCS 15 on arrival, A&Ox2 on arrival. After initial assessment, patient noted to be vomiting enroute to CT scanner.     Interval/Overnight Events   Patient seen bedside with Dr. Larios. Daughter bedside helping feed patient. Daughter with concerns over AEDs and Cdiff medications. Daughter told to speak with Medicine and ID.     MEDICATIONS  (STANDING):  atorvastatin 80 milliGRAM(s) Oral at bedtime  caspofungin IVPB      caspofungin IVPB 50 milliGRAM(s) IV Intermittent every 24 hours  chlorhexidine 2% Cloths 1 Application(s) Topical <User Schedule>  doxazosin 2 milliGRAM(s) Oral at bedtime  folic acid 1 milliGRAM(s) Oral daily  lacosamide IVPB 150 milliGRAM(s) IV Intermittent every 12 hours  metoprolol tartrate 25 milliGRAM(s) Oral every 6 hours  Nephro-roma 1 Tablet(s) Oral daily  pantoprazole  Injectable 40 milliGRAM(s) IV Push two times a day  vancomycin    Solution 125 milliGRAM(s) Oral every 6 hours    MEDICATIONS  (PRN):  acetaminophen     Tablet .. 650 milliGRAM(s) Oral every 6 hours PRN Temp greater or equal to 38C (100.4F), Mild Pain (1 - 3)  artificial tears (preservative free) Ophthalmic Solution 1 Drop(s) Both EYES every 6 hours PRN Dry Eyes    Vital Signs Last 24 Hrs  T(C): 36.7 (05 Jan 2024 04:38), Max: 37.2 (05 Jan 2024 00:00)  T(F): 98 (05 Jan 2024 04:38), Max: 99 (05 Jan 2024 00:00)  HR: 90 (05 Jan 2024 04:38) (90 - 105)  BP: 132/71 (05 Jan 2024 04:38) (105/74 - 168/84)  BP(mean): --  RR: 18 (05 Jan 2024 04:38) (16 - 18)  SpO2: 92% (05 Jan 2024 04:38) (92% - 98%)    Parameters below as of 05 Jan 2024 04:38  Patient On (Oxygen Delivery Method): room air    Neuro- Awake, alert, oriented to self and hospital only,   speech minimal, patient minimally interactive  pupils equal and reactive, eyes open  face symmetric, tongue ML  follows very limited/simple commands on the right only   RUE spontaneous, RLE minimal w/d to pain   Left side dense HP  Incision- CDI, healing well                           9.6    10.05 )-----------( 281      ( 04 Jan 2024 08:10 )             28.1   01-04    137  |  104  |  14.8  ----------------------------<  98  4.3   |  23.0  |  0.81    Ca    8.6      04 Jan 2024 08:10    Plan:  No active NS issues  Q4 neuro checks   AEDs per neuro/medicine   Wound: permitted to shower gently near incision, pat dry, leave open to air.  Continue holding Eliquis- unsure when and if patient would be a safe candidate to resume with multiple episodes of new ICH after attempting to resume. Risk benefit alternative analysis per primary team  Abx per ID for Cdiff  SCDs in bed  Bowel Regimen  supportive care/management per medicine   Seen by Dr. Larios who spoke with the patient's daughter bedside.  NS PA Note    HPI  82y M with PMH HTN, CAD s/p stents, renal cell carcinoma status post left nephrectomy, bladder CA, BPH, CHF, LBBB, vertigo,  HLD, 5.5cm AAA without rupture post EVAR, paroxysmal A-fib on Eliquis, Plavix, and aspirin, early stage Alzheimer dementia transferred from [facility] s/p unwitnessed fall with head strike at home found by wife on floor at 8am. Patient brought to urgent care by wife and had 5 staples to posterior scalp but was instructed to go to nearest ED.  CT head at [place] showed R frontal IPH, SDH, SAH at 9:00. CTA stroke protocol not performed at [facility]. Patient BIB on 6L NC.  Pt GCS 15 on arrival, A&Ox2 on arrival. After initial assessment, patient noted to be vomiting enroute to CT scanner.     Interval/Overnight Events   Patient seen bedside with Dr. Larios. Daughter bedside helping feed patient. Daughter with concerns over AEDs and Cdiff medications. Daughter told to speak with Medicine and ID.     MEDICATIONS  (STANDING):  atorvastatin 80 milliGRAM(s) Oral at bedtime  caspofungin IVPB      caspofungin IVPB 50 milliGRAM(s) IV Intermittent every 24 hours  chlorhexidine 2% Cloths 1 Application(s) Topical <User Schedule>  doxazosin 2 milliGRAM(s) Oral at bedtime  folic acid 1 milliGRAM(s) Oral daily  lacosamide IVPB 150 milliGRAM(s) IV Intermittent every 12 hours  metoprolol tartrate 25 milliGRAM(s) Oral every 6 hours  Nephro-roma 1 Tablet(s) Oral daily  pantoprazole  Injectable 40 milliGRAM(s) IV Push two times a day  vancomycin    Solution 125 milliGRAM(s) Oral every 6 hours    MEDICATIONS  (PRN):  acetaminophen     Tablet .. 650 milliGRAM(s) Oral every 6 hours PRN Temp greater or equal to 38C (100.4F), Mild Pain (1 - 3)  artificial tears (preservative free) Ophthalmic Solution 1 Drop(s) Both EYES every 6 hours PRN Dry Eyes    Vital Signs Last 24 Hrs  T(C): 36.7 (05 Jan 2024 04:38), Max: 37.2 (05 Jan 2024 00:00)  T(F): 98 (05 Jan 2024 04:38), Max: 99 (05 Jan 2024 00:00)  HR: 90 (05 Jan 2024 04:38) (90 - 105)  BP: 132/71 (05 Jan 2024 04:38) (105/74 - 168/84)  BP(mean): --  RR: 18 (05 Jan 2024 04:38) (16 - 18)  SpO2: 92% (05 Jan 2024 04:38) (92% - 98%)    Parameters below as of 05 Jan 2024 04:38  Patient On (Oxygen Delivery Method): room air    Neuro- Awake, alert, oriented to self and hospital only,   speech minimal, patient minimally interactive  pupils equal and reactive, eyes open  face symmetric, tongue ML  follows very limited/simple commands on the right only   RUE spontaneous, RLE minimal w/d to pain   Left side dense HP  Incision- CDI, healing well                           9.6    10.05 )-----------( 281      ( 04 Jan 2024 08:10 )             28.1   01-04    137  |  104  |  14.8  ----------------------------<  98  4.3   |  23.0  |  0.81    Ca    8.6      04 Jan 2024 08:10    Plan:  No active NS issues  Q4 neuro checks   AEDs per neuro/medicine   Wound: permitted to shower gently near incision, pat dry, leave open to air.  Continue holding Eliquis- unsure when and if patient would be a safe candidate to resume with multiple episodes of new ICH after attempting to resume. Risk benefit alternative analysis per primary team  Abx per ID for Cdiff  SCDs in bed  Bowel Regimen  supportive care/management per medicine   Seen by Dr. Larios who spoke with the patient's daughter bedside.

## 2024-01-05 NOTE — PROGRESS NOTE ADULT - SUBJECTIVE AND OBJECTIVE BOX
[facility]  INFECTIOUS DISEASES at [place] and [place]  ===============================================================                  Roman Segura MD               Diplomates American Board of Internal Medicine & Infectious Diseases                * [place] Office - Appt - Tel  [phone] Fax [phone]                * [place] Office - Appt - Tel [phone] Fax [phone]                                  Hospital Consult line:  [phone]  ==============================================================    DEUCE BALL [id number]    Follow up: fungemia, CDI     No acute events overnights  Afebrile and hemodynamically stable   Girlfriend at bedside     I have personally reviewed the labs and data; pertinent labs and data are listed in this note; please see below.     _______________________________________________________________  REVIEW OF SYSTEMS  Unable to obtain due to medical condition - sleeping, not easily arousable   ________________________________________________________________  Allergies:  penicillin (Rash)  heparin (Other (Severe))  penicillin (Hives)  Cipro (Other)  Levaquin (Other)  heparin (Other)        ________________________________________________________________  PHYSICAL EXAM  GEN: in NAD, lying in bed.   HEENT: Anicteric sclerae. Craniotomy site c/d/i .   NECK: Supple. Mid-line trachea. No palpable masses or LN  LUNGS: eupneic. CTA B/L.  HEART: RRR, no m/r/g  ABDOMEN: Soft, NT, ND, no HSM.  +BS.    :  No Vallecillo catheter  EXTREMITIES: well perfused, without  edema.  MSK: No joint swelling or deformity   NEUROLOGIC: left hemiplegia   PSYCHIATRIC: calm   LINES: PIV   ________________________________________________________________  Vitals:  T(F): 98.5 (05 Jan 2024 11:41), Max: 99 (05 Jan 2024 00:00)  HR: 90 (05 Jan 2024 11:41)  BP: 138/75 (05 Jan 2024 11:41)  RR: 18 (05 Jan 2024 11:41)  SpO2: 94% (05 Jan 2024 11:41) (92% - 98%)  temp max in last 48H T(F): , Max: 99 (01-05-24 @ 00:00)    Current Antibiotics:  caspofungin IVPB      caspofungin IVPB 50 milliGRAM(s) IV Intermittent every 24 hours  vancomycin    Solution 125 milliGRAM(s) Oral every 6 hours    Other medications:  atorvastatin 80 milliGRAM(s) Oral at bedtime  chlorhexidine 2% Cloths 1 Application(s) Topical <User Schedule>  doxazosin 2 milliGRAM(s) Oral at bedtime  folic acid 1 milliGRAM(s) Oral daily  lacosamide IVPB 150 milliGRAM(s) IV Intermittent every 12 hours  metoprolol tartrate 25 milliGRAM(s) Oral every 6 hours  Nephro-roma 1 Tablet(s) Oral daily  pantoprazole  Injectable 40 milliGRAM(s) IV Push two times a day                            9.6    10.05 )-----------( 281      ( 04 Jan 2024 08:10 )             28.1     01-04    137  |  104  |  14.8  ----------------------------<  98  4.3   |  23.0  |  0.81    Ca    8.6      04 Jan 2024 08:10      RECENT CULTURES:  12-28 @ 05:22 .Blood Blood-Peripheral     No growth at 5 days    12-28 @ 05:22 RVP with SARS-CoV-2   NotDetec      12-25 @ 04:01 .Blood Blood     No growth at 5 days        12-25 @ 03:51 .Blood Blood     No growth at 5 days        12-22 @ 22:41 .Blood Blood-Peripheral Blood Culture PCR  Candida (Torulopsis) glabrata    Growth in aerobic bottle: Candida glabrata  Direct identification is available within approximately 3-5  hours either by Blood Panel Multiplexed PCR or Direct  MALDI-TOF. Details: [email or web address]    Growth in aerobic bottle: Yeast like cells      12-22 @ 22:30 .Blood Blood-Peripheral     Growth in aerobic bottle: Candida glabrata  See previous culture [id number]    Growth in aerobic bottle: Yeast like cells      WBC Count: 10.05 K/uL (01-04-24 @ 08:10)  WBC Count: 8.73 K/uL (01-03-24 @ 13:18)  WBC Count: 8.94 K/uL (01-02-24 @ 08:00)  WBC Count: 11.05 K/uL (01-01-24 @ 04:44)    Creatinine: 0.81 mg/dL (01-04-24 @ 08:10)  Creatinine: 0.78 mg/dL (01-03-24 @ 13:18)  Creatinine: 0.64 mg/dL (01-02-24 @ 08:00)  Creatinine: 0.70 mg/dL (01-01-24 @ 04:44)      SARS-CoV-2: NotDetec (12-28-23 @ 05:22)  SARS-CoV-2: NotDetec (12-20-23 @ 09:55)  SARS-CoV-2: NotDetec (12-10-23 @ 18:20)    ________________________________________________________________  CARDIOLOGY   < from: TTE W or WO Ultrasound Enhancing Agent (12.21.23 @ 19:59) >     CONCLUSIONS:      1. Limited echo to rule out RV strain.   2. Technically difficult image quality.   3. Left ventricular cavity is normal. Left ventricular systolic function is hyperdynamic with an ejection fraction visually estimated at 70 to 75 %.   4. Normal right ventricular cavity size and systolic function.   5. No pericardial effusion seen.    < end of copied text >      ________________________________________________________________  RADIOLOGY  < from: Xray Chest 1 View- PORTABLE-Urgent (Xray Chest 1 View- PORTABLE-Urgent .) (12.28.23 @ 05:50) >  FINDINGS:  Difficult to assess heart size in this projection.  S/P removal of ET and NG tube.  Small left pleural effusion.  Interval development of pulmonary vascular congestive changes.  Bibasilar haziness which may be due to atelectasis or to infection.  No pneumothorax.    IMPRESSION:    New pulmonary vascular congestive changes.  Trace left pleural effusion.  Basilar atelectasis and/or pneumonia.    < end of copied text >   [facility]  INFECTIOUS DISEASES at [place] and [place]  ===============================================================                  Roman Segura MD               Diplomates American Board of Internal Medicine & Infectious Diseases                * [place] Office - Appt - Tel  [phone] Fax [phone]                * [place] Office - Appt - Tel [phone] Fax [phone]                                  Hospital Consult line:  [phone]  ==============================================================    DEUCE BALL [id number]    Follow up: fungemia, CDI     No acute events overnights  Afebrile and hemodynamically stable   Girlfriend at bedside     I have personally reviewed the labs and data; pertinent labs and data are listed in this note; please see below.     _______________________________________________________________  REVIEW OF SYSTEMS  Unable to obtain due to medical condition - sleeping, not easily arousable   ________________________________________________________________  Allergies:  penicillin (Rash)  heparin (Other (Severe))  penicillin (Hives)  Cipro (Other)  Levaquin (Other)  heparin (Other)        ________________________________________________________________  PHYSICAL EXAM  GEN: in NAD, lying in bed.   HEENT: Anicteric sclerae. Craniotomy site c/d/i .   NECK: Supple. Mid-line trachea. No palpable masses or LN  LUNGS: eupneic. CTA B/L.  HEART: RRR, no m/r/g  ABDOMEN: Soft, NT, ND, no HSM.  +BS.    :  No Vallecillo catheter  EXTREMITIES: well perfused, without  edema.  MSK: No joint swelling or deformity   NEUROLOGIC: left hemiplegia   PSYCHIATRIC: calm   LINES: PIV   ________________________________________________________________  Vitals:  T(F): 98.5 (05 Jan 2024 11:41), Max: 99 (05 Jan 2024 00:00)  HR: 90 (05 Jan 2024 11:41)  BP: 138/75 (05 Jan 2024 11:41)  RR: 18 (05 Jan 2024 11:41)  SpO2: 94% (05 Jan 2024 11:41) (92% - 98%)  temp max in last 48H T(F): , Max: 99 (01-05-24 @ 00:00)    Current Antibiotics:  caspofungin IVPB      caspofungin IVPB 50 milliGRAM(s) IV Intermittent every 24 hours  vancomycin    Solution 125 milliGRAM(s) Oral every 6 hours    Other medications:  atorvastatin 80 milliGRAM(s) Oral at bedtime  chlorhexidine 2% Cloths 1 Application(s) Topical <User Schedule>  doxazosin 2 milliGRAM(s) Oral at bedtime  folic acid 1 milliGRAM(s) Oral daily  lacosamide IVPB 150 milliGRAM(s) IV Intermittent every 12 hours  metoprolol tartrate 25 milliGRAM(s) Oral every 6 hours  Nephro-roma 1 Tablet(s) Oral daily  pantoprazole  Injectable 40 milliGRAM(s) IV Push two times a day                            9.6    10.05 )-----------( 281      ( 04 Jan 2024 08:10 )             28.1     01-04    137  |  104  |  14.8  ----------------------------<  98  4.3   |  23.0  |  0.81    Ca    8.6      04 Jan 2024 08:10      RECENT CULTURES:  12-28 @ 05:22 .Blood Blood-Peripheral     No growth at 5 days    12-28 @ 05:22 RVP with SARS-CoV-2   NotDetec      12-25 @ 04:01 .Blood Blood     No growth at 5 days        12-25 @ 03:51 .Blood Blood     No growth at 5 days        12-22 @ 22:41 .Blood Blood-Peripheral Blood Culture PCR  Candida (Torulopsis) glabrata    Growth in aerobic bottle: Candida glabrata  Direct identification is available within approximately 3-5  hours either by Blood Panel Multiplexed PCR or Direct  MALDI-TOF. Details: [email or web address]    Growth in aerobic bottle: Yeast like cells      12-22 @ 22:30 .Blood Blood-Peripheral     Growth in aerobic bottle: Candida glabrata  See previous culture [id number]    Growth in aerobic bottle: Yeast like cells      WBC Count: 10.05 K/uL (01-04-24 @ 08:10)  WBC Count: 8.73 K/uL (01-03-24 @ 13:18)  WBC Count: 8.94 K/uL (01-02-24 @ 08:00)  WBC Count: 11.05 K/uL (01-01-24 @ 04:44)    Creatinine: 0.81 mg/dL (01-04-24 @ 08:10)  Creatinine: 0.78 mg/dL (01-03-24 @ 13:18)  Creatinine: 0.64 mg/dL (01-02-24 @ 08:00)  Creatinine: 0.70 mg/dL (01-01-24 @ 04:44)      SARS-CoV-2: NotDetec (12-28-23 @ 05:22)  SARS-CoV-2: NotDetec (12-20-23 @ 09:55)  SARS-CoV-2: NotDetec (12-10-23 @ 18:20)    ________________________________________________________________  CARDIOLOGY   < from: TTE W or WO Ultrasound Enhancing Agent (12.21.23 @ 19:59) >     CONCLUSIONS:      1. Limited echo to rule out RV strain.   2. Technically difficult image quality.   3. Left ventricular cavity is normal. Left ventricular systolic function is hyperdynamic with an ejection fraction visually estimated at 70 to 75 %.   4. Normal right ventricular cavity size and systolic function.   5. No pericardial effusion seen.    < end of copied text >      ________________________________________________________________  RADIOLOGY  < from: Xray Chest 1 View- PORTABLE-Urgent (Xray Chest 1 View- PORTABLE-Urgent .) (12.28.23 @ 05:50) >  FINDINGS:  Difficult to assess heart size in this projection.  S/P removal of ET and NG tube.  Small left pleural effusion.  Interval development of pulmonary vascular congestive changes.  Bibasilar haziness which may be due to atelectasis or to infection.  No pneumothorax.    IMPRESSION:    New pulmonary vascular congestive changes.  Trace left pleural effusion.  Basilar atelectasis and/or pneumonia.    < end of copied text >

## 2024-01-05 NOTE — PROGRESS NOTE ADULT - ATTENDING COMMENTS
patient seen and eval. agree with above.   patient awake, alert x 1 , follows simple commands , mildly lethargic but aware of self and family   left hemiplegia noted   c/w antifungals for fungemia as per id   lengthy discussion with family / wife/ daughter / girlfriend luis ( [phone] )   family concerned about high dose antiseizure medications causing lethargy.   discussed with neurology , changed vimpat to 50 mg po bid low dose   no further interventions from neurology standpoint   daily pt   OOB to chair   speech eval   plan for acute rehab vs [facility] once medically stable patient seen and eval. agree with above.   patient awake, alert x 1 , follows simple commands , mildly lethargic but aware of self and family   left hemiplegia noted   c/w antifungals for fungemia as per id   lengthy discussion with family / wife/ daughter / girlfriend luis ( [phone] )   family concerned about high dose antiseizure medications causing lethargy.   discussed with neurology , changed vimpat to 50 mg po bid low dose   no further interventions from neurology standpoint   daily pt   OOB to chair   speech eval   plan for acute rehab vs [facility] once medically stable

## 2024-01-05 NOTE — PROGRESS NOTE ADULT - ASSESSMENT
83M w/ PMHX of HTN, CAD s/p PCI, renal cell ca s/p L nephrectomy, bladder CA, BPH, CHF, LBBB, vertigo,  HLD, 5.5cm AAA s/p post EVAR, paroxysmal afib on Eliquis, Plavix, ASA, h/o HIT, early stage Alzheimer's dementia, trf from [place] 11/10/23 s/p fall found with multicompartmental ICH, s/p R craniectomy 11/10 and eventual cranioplasty 12/7/23. Hospital course complicated by afib with RVR, Klebsiella pneumonia, LUE DVT, multiple subsequent episodes of ICH after attempting to restart Eliquis, now with acute respiratory failure, aspiration pneumonia, shock, hematemesis.    # Encephalopathy, multifactorial. ICH, CVA, ICU, possible delirium  Waxing, waning  - Needs frequent reorientation  - Neurology signed off    # Aspiration pneumonia  - Aspiration precautions  - Monitor for hypoxia  - s/p Meropenem  - ID follow up     # hypokalemia  - K 3.0  - Replete  - Monitor BMP  - maintain mg >2, phos >4, K >4     # C diff   - c/w vancomycin 125mg Q 6h    # Dysphagia   puree with no liquids  - SLP evaluation noted    # fungemia: 9 candida galbrata  - c/w caspofungin  - ID consult appreciated    # Atrial Fibrillation  - Metoprolol 25mg q6h  - No anticoagulation given ICH    # ICH s/p craniotomy and cranioplasty  abnormal EEG with risk of seizures  - Vimpat 150mg q12h    # anemia: likely multifactorial    s/p PRBC    c/w PPI,     no intervention per GI.    DVT ppx  - SCD   83M w/ PMHX of HTN, CAD s/p PCI, renal cell ca s/p L nephrectomy, bladder CA, BPH, CHF, LBBB, vertigo,  HLD, 5.5cm AAA s/p post EVAR, paroxysmal afib on Eliquis, Plavix, ASA, h/o HIT, early stage Alzheimer's dementia, trf from [place] 11/10/23 s/p fall found with multicompartmental ICH, s/p R craniectomy 11/10 and eventual cranioplasty 12/7/23. Hospital course complicated by afib with RVR, Klebsiella pneumonia, LUE DVT, multiple subsequent episodes of ICH after attempting to restart Eliquis, now with acute respiratory failure, aspiration pneumonia, shock, hematemesis.    # Encephalopathy, multifactorial. ICH, CVA, ICU, possible delirium  Waxing, waning  - Needs frequent reorientation  - Neurology signed off    # Aspiration pneumonia  - Aspiration precautions  - Monitor for hypoxia  - s/p Meropenem  - ID follow up     # hypokalemia  - K 3.0  - Replete  - Monitor BMP  - maintain mg >2, phos >4, K >4     # C diff   - c/w vancomycin 125mg Q 6h    # Dysphagia   puree with no liquids  - SLP evaluation noted    # fungemia: 9 candida galbrata  - c/w caspofungin  - ID consult appreciated    # Atrial Fibrillation  - Metoprolol 25mg q6h  - No anticoagulation given ICH    # ICH s/p craniotomy and cranioplasty  abnormal EEG with risk of seizures  - Vimpat 150mg q12h    # anemia: likely multifactorial    s/p PRBC    c/w PPI,     no intervention per GI.    DVT ppx  - SCD   83M w/ PMHX of HTN, CAD s/p PCI, renal cell ca s/p L nephrectomy, bladder CA, BPH, CHF, LBBB, vertigo,  HLD, 5.5cm AAA s/p post EVAR, paroxysmal afib on Eliquis, Plavix, ASA, h/o HIT, early stage Alzheimer's dementia, trf from [place] 11/10/23 s/p fall found with multicompartmental ICH, s/p R craniectomy 11/10 and eventual cranioplasty 12/7/23. Hospital course complicated by afib with RVR, Klebsiella pneumonia, LUE DVT, multiple subsequent episodes of ICH after attempting to restart Eliquis, now with acute respiratory failure, aspiration pneumonia, shock, hematemesis.    # Encephalopathy, multifactorial. ICH, CVA, ICU, possible delirium  Waxing, waning  - Needs frequent reorientation  - Neurology signed off    # ICH s/p craniotomy and cranioplasty  # Abnormal EEG  - Potential epileptogenic focus in the right anterior head region with underlying cerebral dysfunction and evidence for overlying skull defect  - Family concerned about Antiseizure medications making patient lethargic and confused  - Had long conversation with family, they request to stop antiseizure medication.   - Family understands risks that patient may have a seizure which can lead to status epilepticus or even death.  - Family agreeable to decreasing dose of antiseizure medication.  - Vimpat dose decreased to 50 mg PO BID    # Aspiration pneumonia  - Aspiration precautions  - Monitor for hypoxia  - s/p Meropenem  - ID follow up noted    # hypokalemia (resolved)  - Replete  - Monitor BMP  - maintain mg >2, phos >4, K >4     # C diff   - c/w vancomycin 125mg Q 6h   - 2 weeks course    # Dysphagia  - Puree with moderately thick liquids  - SLP evaluation noted    # fungemia:  candida galbrata  - c/w caspofungin  - ID consult appreciated    # Atrial Fibrillation  - Metoprolol 25mg q6h  - No anticoagulation given ICH    # anemia: likely multifactorial  - s/p PRBC  - c/w PPI,   - no intervention per GI.    DVT ppx  - SCD   83M w/ PMHX of HTN, CAD s/p PCI, renal cell ca s/p L nephrectomy, bladder CA, BPH, CHF, LBBB, vertigo,  HLD, 5.5cm AAA s/p post EVAR, paroxysmal afib on Eliquis, Plavix, ASA, h/o HIT, early stage Alzheimer's dementia, trf from [place] 11/10/23 s/p fall found with multicompartmental ICH, s/p R craniectomy 11/10 and eventual cranioplasty 12/7/23. Hospital course complicated by afib with RVR, Klebsiella pneumonia, LUE DVT, multiple subsequent episodes of ICH after attempting to restart Eliquis, now with acute respiratory failure, aspiration pneumonia, shock, hematemesis.    # Encephalopathy, multifactorial. ICH, CVA, ICU, possible delirium  Waxing, waning  - Needs frequent reorientation  - Neurology signed off    # ICH s/p craniotomy and cranioplasty  # Abnormal EEG  - Potential epileptogenic focus in the right anterior head region with underlying cerebral dysfunction and evidence for overlying skull defect  - Family concerned about Antiseizure medications making patient lethargic and confused  - Had long conversation with family, they request to stop antiseizure medication.   - Family understands risks that patient may have a seizure which can lead to status epilepticus or even death.  - Family agreeable to decreasing dose of antiseizure medication.  - Vimpat dose decreased to 50 mg PO BID    # Aspiration pneumonia  - Aspiration precautions  - Monitor for hypoxia  - s/p Meropenem  - ID follow up noted    # hypokalemia (resolved)  - Replete  - Monitor BMP  - maintain mg >2, phos >4, K >4     # C diff   - c/w vancomycin 125mg Q 6h   - 2 weeks course    # Dysphagia  - Puree with moderately thick liquids  - SLP evaluation noted    # fungemia:  candida galbrata  - c/w caspofungin  - ID consult appreciated    # Atrial Fibrillation  - Metoprolol 25mg q6h  - No anticoagulation given ICH    # anemia: likely multifactorial  - s/p PRBC  - c/w PPI,   - no intervention per GI.    DVT ppx  - SCD   83M w/ PMHX of HTN, CAD s/p PCI, renal cell ca s/p L nephrectomy, bladder CA, BPH, CHF, LBBB, vertigo,  HLD, 5.5cm AAA s/p post EVAR, paroxysmal afib on Eliquis, Plavix, ASA, h/o HIT, early stage Alzheimer's dementia, trf from [place] 11/10/23 s/p fall found with multicompartmental ICH, s/p R craniectomy 11/10 and eventual cranioplasty 12/7/23. Hospital course complicated by afib with RVR, Klebsiella pneumonia, LUE DVT, multiple subsequent episodes of ICH after attempting to restart Eliquis, now with acute respiratory failure, aspiration pneumonia, shock, hematemesis.    # Encephalopathy, multifactorial. ICH, CVA, ICU, possible delirium  Waxing, waning  - Needs frequent reorientation  - Neurology signed off    # ICH s/p craniotomy and cranioplasty  # Abnormal EEG  - Potential epileptogenic focus in the right anterior head region with underlying cerebral dysfunction and evidence for overlying skull defect  - Family concerned about Antiseizure medications making patient lethargic and confused  - Had long conversation with family, they request to stop antiseizure medication.   - Family understands risks that patient may have a seizure which can lead to status epilepticus or even death.  - Family agreeable to decreasing dose of antiseizure medication.  - Vimpat dose decreased to 50 mg PO BID     # Aspiration pneumonia  - Aspiration precautions  - Monitor for hypoxia  - s/p Meropenem  - ID follow up noted    # hypokalemia (resolved)  - Replete  - Monitor BMP  - maintain mg >2, phos >4, K >4     # C diff   - c/w vancomycin 125mg Q 6h   - 2 weeks course    # Dysphagia  - Puree with moderately thick liquids  - SLP evaluation noted    # fungemia:  candida galbrata  - c/w caspofungin  - ID consult appreciated    # Atrial Fibrillation  - Metoprolol 25mg q6h  - No anticoagulation given ICH    # anemia: likely multifactorial  - s/p PRBC  - c/w PPI,   - no intervention per GI.    DVT ppx  - SCD   83M w/ PMHX of HTN, CAD s/p PCI, renal cell ca s/p L nephrectomy, bladder CA, BPH, CHF, LBBB, vertigo,  HLD, 5.5cm AAA s/p post EVAR, paroxysmal afib on Eliquis, Plavix, ASA, h/o HIT, early stage Alzheimer's dementia, trf from [place] 11/10/23 s/p fall found with multicompartmental ICH, s/p R craniectomy 11/10 and eventual cranioplasty 12/7/23. Hospital course complicated by afib with RVR, Klebsiella pneumonia, LUE DVT, multiple subsequent episodes of ICH after attempting to restart Eliquis, now with acute respiratory failure, aspiration pneumonia, shock, hematemesis.    # Encephalopathy, multifactorial. ICH, CVA, ICU, possible delirium  Waxing, waning  - Needs frequent reorientation  - Neurology signed off    # ICH s/p craniotomy and cranioplasty  # Abnormal EEG  - Potential epileptogenic focus in the right anterior head region with underlying cerebral dysfunction and evidence for overlying skull defect  - Family concerned about Antiseizure medications making patient lethargic and confused  - Had long conversation with family, they request to stop antiseizure medication.   - Family understands risks that patient may have a seizure which can lead to status epilepticus or even death.  - Family agreeable to decreasing dose of antiseizure medication.  - Vimpat dose decreased to 50 mg PO BID     # Aspiration pneumonia  - Aspiration precautions  - Monitor for hypoxia  - s/p Meropenem  - ID follow up noted    # hypokalemia (resolved)  - Replete  - Monitor BMP  - maintain mg >2, phos >4, K >4     # C diff   - c/w vancomycin 125mg Q 6h   - 2 weeks course    # Dysphagia  - Puree with moderately thick liquids  - SLP evaluation noted    # fungemia:  candida galbrata  - c/w caspofungin  - ID consult appreciated    # Atrial Fibrillation  - Metoprolol 25mg q6h  - No anticoagulation given ICH    # anemia: likely multifactorial  - s/p PRBC  - c/w PPI,   - no intervention per GI.    DVT ppx  - SCD

## 2024-01-05 NOTE — PROGRESS NOTE ADULT - ASSESSMENT
82y M with PMH HTN, CAD s/p stents, renal cell carcinoma status post left nephrectomy, bladder CA, BPH, CHF, LBBB, vertigo,  HLD, 5.5cm AAA without rupture post EVAR, paroxysmal A-fib on Eliquis, Plavix, and aspirin, early stage Alzheimer dementia transferred from [facility] s/p unwitnessed fall with head strike at home found by wife on floor at 8am. Patient brought to urgent care by wife and had 5 staples to posterior scalp but was instructed to go to nearest ED.  CT head at [facility] showed R frontal IPH, SDH, SAH s/p decompressive craniectomy 11/10. Initial TTE with ?  AV vegetation vs calcification vs artifact, repeat TTE 11/21/23 nondiagnostic, family refused WHIT.   Treated for klebseila aerogenes tracheobronchitis,  LUE DVT, CVA, s/p cranioplasty on 12/7,   Hospital course c/b intracranial bleed, AMS, vomiting and suspected aspiration and septic shock. Patient  intubated 12/21 and ETT cx + enterobacter (I) to imi, BCX + c.glabrata. cta/p showed stable endoleak-no intervention per vascular  ID called for fungemia. Patient wih low grade temp and ongoing liquid stool in rectal tube    - 12/22 BCX candida glabrata  - susceptibilities reviewed  - Continue caspofungin (anticipate at least 2 week course)  - Repeat BCX ngtd  - No evidence of metastatic foci   - completed meropenem course for PNA  - rt fem line-removed  - CSF cultures neg   - c. diff positive with +toxins  - On oral vancomycin since 12/29 - anticipate 2 week course   - 12/21 TTE noted.   - ophthal eval when stable  - leukocytosis resolved  - afebrile and hemodynamically stable     D/w housestaff  D/w girlfriend at bedside  82y M with PMH HTN, CAD s/p stents, renal cell carcinoma status post left nephrectomy, bladder CA, BPH, CHF, LBBB, vertigo,  HLD, 5.5cm AAA without rupture post EVAR, paroxysmal A-fib on Eliquis, Plavix, and aspirin, early stage Alzheimer dementia transferred from [facility] s/p unwitnessed fall with head strike at home found by wife on floor at 8am. Patient brought to urgent care by wife and had 5 staples to posterior scalp but was instructed to go to nearest ED.  CT head at [facility] showed R frontal IPH, SDH, SAH s/p decompressive craniectomy 11/10. Initial TTE with ?  AV vegetation vs calcification vs artifact, repeat TTE 11/21/23 nondiagnostic, family refused WHIT.   Treated for klebseila aerogenes tracheobronchitis,  LUE DVT, CVA, s/p cranioplasty on 12/7,   Hospital course c/b intracranial bleed, AMS, vomiting and suspected aspiration and septic shock. Patient  intubated 12/21 and ETT cx + enterobacter (I) to imi, BCX + c.glabrata. cta/p showed stable endoleak-no intervention per vascular  ID called for fungemia. Patient wih low grade temp and ongoing liquid stool in rectal tube    - 12/22 BCX candida glabrata  - susceptibilities reviewed  - Continue caspofungin (anticipate at least 2 week course)  - Repeat BCX ngtd  - No evidence of metastatic foci   - completed meropenem course for PNA  - rt fem line-removed  - CSF cultures neg   - c. diff positive with +toxins  - On oral vancomycin since 12/29 - anticipate 2 week course   - 12/21 TTE noted.   - ophthal eval when stable  - leukocytosis resolved  - afebrile and hemodynamically stable     D/w housestaff  D/w girlfriend at bedside

## 2024-01-06 LAB
ALBUMIN SERPL ELPH-MCNC: 2.5 G/DL — LOW (ref 3.3–5.2)
ALBUMIN SERPL ELPH-MCNC: 2.5 G/DL — LOW (ref 3.3–5.2)
ALP SERPL-CCNC: 90 U/L — SIGNIFICANT CHANGE UP (ref 40–120)
ALP SERPL-CCNC: 90 U/L — SIGNIFICANT CHANGE UP (ref 40–120)
ALT FLD-CCNC: 12 U/L — SIGNIFICANT CHANGE UP
ALT FLD-CCNC: 12 U/L — SIGNIFICANT CHANGE UP
ANION GAP SERPL CALC-SCNC: 12 MMOL/L — SIGNIFICANT CHANGE UP (ref 5–17)
ANION GAP SERPL CALC-SCNC: 12 MMOL/L — SIGNIFICANT CHANGE UP (ref 5–17)
AST SERPL-CCNC: 14 U/L — SIGNIFICANT CHANGE UP
AST SERPL-CCNC: 14 U/L — SIGNIFICANT CHANGE UP
BILIRUB SERPL-MCNC: 0.3 MG/DL — LOW (ref 0.4–2)
BILIRUB SERPL-MCNC: 0.3 MG/DL — LOW (ref 0.4–2)
BUN SERPL-MCNC: 17 MG/DL — SIGNIFICANT CHANGE UP (ref 8–20)
BUN SERPL-MCNC: 17 MG/DL — SIGNIFICANT CHANGE UP (ref 8–20)
CALCIUM SERPL-MCNC: 9.1 MG/DL — SIGNIFICANT CHANGE UP (ref 8.4–10.5)
CALCIUM SERPL-MCNC: 9.1 MG/DL — SIGNIFICANT CHANGE UP (ref 8.4–10.5)
CHLORIDE SERPL-SCNC: 99 MMOL/L — SIGNIFICANT CHANGE UP (ref 96–108)
CHLORIDE SERPL-SCNC: 99 MMOL/L — SIGNIFICANT CHANGE UP (ref 96–108)
CO2 SERPL-SCNC: 23 MMOL/L — SIGNIFICANT CHANGE UP (ref 22–29)
CO2 SERPL-SCNC: 23 MMOL/L — SIGNIFICANT CHANGE UP (ref 22–29)
CREAT SERPL-MCNC: 0.69 MG/DL — SIGNIFICANT CHANGE UP (ref 0.5–1.3)
CREAT SERPL-MCNC: 0.69 MG/DL — SIGNIFICANT CHANGE UP (ref 0.5–1.3)
EGFR: 92 ML/MIN/1.73M2 — SIGNIFICANT CHANGE UP
EGFR: 92 ML/MIN/1.73M2 — SIGNIFICANT CHANGE UP
GLUCOSE SERPL-MCNC: 107 MG/DL — HIGH (ref 70–99)
GLUCOSE SERPL-MCNC: 107 MG/DL — HIGH (ref 70–99)
HCT VFR BLD CALC: 27.9 % — LOW (ref 39–50)
HCT VFR BLD CALC: 27.9 % — LOW (ref 39–50)
HGB BLD-MCNC: 9.1 G/DL — LOW (ref 13–17)
HGB BLD-MCNC: 9.1 G/DL — LOW (ref 13–17)
MAGNESIUM SERPL-MCNC: 1.6 MG/DL — SIGNIFICANT CHANGE UP (ref 1.6–2.6)
MAGNESIUM SERPL-MCNC: 1.6 MG/DL — SIGNIFICANT CHANGE UP (ref 1.6–2.6)
MCHC RBC-ENTMCNC: 30 PG — SIGNIFICANT CHANGE UP (ref 27–34)
MCHC RBC-ENTMCNC: 30 PG — SIGNIFICANT CHANGE UP (ref 27–34)
MCHC RBC-ENTMCNC: 32.6 GM/DL — SIGNIFICANT CHANGE UP (ref 32–36)
MCHC RBC-ENTMCNC: 32.6 GM/DL — SIGNIFICANT CHANGE UP (ref 32–36)
MCV RBC AUTO: 92.1 FL — SIGNIFICANT CHANGE UP (ref 80–100)
MCV RBC AUTO: 92.1 FL — SIGNIFICANT CHANGE UP (ref 80–100)
PLATELET # BLD AUTO: 261 K/UL — SIGNIFICANT CHANGE UP (ref 150–400)
PLATELET # BLD AUTO: 261 K/UL — SIGNIFICANT CHANGE UP (ref 150–400)
POTASSIUM SERPL-MCNC: 4.1 MMOL/L — SIGNIFICANT CHANGE UP (ref 3.5–5.3)
POTASSIUM SERPL-MCNC: 4.1 MMOL/L — SIGNIFICANT CHANGE UP (ref 3.5–5.3)
POTASSIUM SERPL-SCNC: 4.1 MMOL/L — SIGNIFICANT CHANGE UP (ref 3.5–5.3)
POTASSIUM SERPL-SCNC: 4.1 MMOL/L — SIGNIFICANT CHANGE UP (ref 3.5–5.3)
PROT SERPL-MCNC: 6.4 G/DL — LOW (ref 6.6–8.7)
PROT SERPL-MCNC: 6.4 G/DL — LOW (ref 6.6–8.7)
RBC # BLD: 3.03 M/UL — LOW (ref 4.2–5.8)
RBC # BLD: 3.03 M/UL — LOW (ref 4.2–5.8)
RBC # FLD: 14.6 % — HIGH (ref 10.3–14.5)
RBC # FLD: 14.6 % — HIGH (ref 10.3–14.5)
SODIUM SERPL-SCNC: 134 MMOL/L — LOW (ref 135–145)
SODIUM SERPL-SCNC: 134 MMOL/L — LOW (ref 135–145)
WBC # BLD: 8.75 K/UL — SIGNIFICANT CHANGE UP (ref 3.8–10.5)
WBC # BLD: 8.75 K/UL — SIGNIFICANT CHANGE UP (ref 3.8–10.5)
WBC # FLD AUTO: 8.75 K/UL — SIGNIFICANT CHANGE UP (ref 3.8–10.5)
WBC # FLD AUTO: 8.75 K/UL — SIGNIFICANT CHANGE UP (ref 3.8–10.5)

## 2024-01-06 PROCEDURE — 99233 SBSQ HOSP IP/OBS HIGH 50: CPT

## 2024-01-06 RX ADMIN — Medication 1 MILLIGRAM(S): at 12:30

## 2024-01-06 RX ADMIN — Medication 125 MILLIGRAM(S): at 05:52

## 2024-01-06 RX ADMIN — Medication 125 MILLIGRAM(S): at 00:28

## 2024-01-06 RX ADMIN — CHLORHEXIDINE GLUCONATE 1 APPLICATION(S): 213 SOLUTION TOPICAL at 05:53

## 2024-01-06 RX ADMIN — PANTOPRAZOLE SODIUM 40 MILLIGRAM(S): 20 TABLET, DELAYED RELEASE ORAL at 18:01

## 2024-01-06 RX ADMIN — PANTOPRAZOLE SODIUM 40 MILLIGRAM(S): 20 TABLET, DELAYED RELEASE ORAL at 05:53

## 2024-01-06 RX ADMIN — Medication 25 MILLIGRAM(S): at 00:28

## 2024-01-06 RX ADMIN — CASPOFUNGIN ACETATE 260 MILLIGRAM(S): 7 INJECTION, POWDER, LYOPHILIZED, FOR SOLUTION INTRAVENOUS at 12:29

## 2024-01-06 RX ADMIN — Medication 125 MILLIGRAM(S): at 12:30

## 2024-01-06 RX ADMIN — Medication 1 TABLET(S): at 12:31

## 2024-01-06 RX ADMIN — Medication 25 MILLIGRAM(S): at 12:30

## 2024-01-06 RX ADMIN — LACOSAMIDE 50 MILLIGRAM(S): 50 TABLET ORAL at 05:52

## 2024-01-06 RX ADMIN — Medication 125 MILLIGRAM(S): at 18:01

## 2024-01-06 RX ADMIN — ATORVASTATIN CALCIUM 80 MILLIGRAM(S): 80 TABLET, FILM COATED ORAL at 22:20

## 2024-01-06 RX ADMIN — Medication 2 MILLIGRAM(S): at 22:20

## 2024-01-06 RX ADMIN — Medication 25 MILLIGRAM(S): at 18:02

## 2024-01-06 RX ADMIN — Medication 25 MILLIGRAM(S): at 05:53

## 2024-01-06 NOTE — PROGRESS NOTE ADULT - ASSESSMENT
83M w/ PMHX of HTN, CAD s/p PCI, renal cell ca s/p L nephrectomy, bladder CA, BPH, CHF, LBBB, vertigo,  HLD, 5.5cm AAA s/p post EVAR, paroxysmal afib on Eliquis, Plavix, ASA, h/o HIT, early stage Alzheimer's dementia, trf from [place] 11/10/23 s/p fall found with multicompartmental ICH, s/p R craniectomy 11/10 and eventual cranioplasty 12/7/23. Hospital course complicated by afib with RVR, Klebsiella pneumonia, LUE DVT, multiple subsequent episodes of ICH after attempting to restart Eliquis, now with acute respiratory failure, aspiration pneumonia, shock, hematemesis.    # Encephalopathy, multifactorial. ICH, CVA, ICU, possible delirium  Waxing, waning  - Needs frequent reorientation  - Neurology signed off    # ICH s/p craniotomy and cranioplasty  # Abnormal EEG  - Potential epileptogenic focus in the right anterior head region with underlying cerebral dysfunction and evidence for overlying skull defect  - Family concerned about Antiseizure medications making patient lethargic and confused  - Had long conversation with family, they request to stop antiseizure medication.   - Family understands risks that patient may have a seizure which can lead to status epilepticus or even death.  - Family agreeable to decreasing dose of antiseizure medication.  - Vimpat 50mg BID    # Aspiration pneumonia  - Aspiration precautions  - Monitor for hypoxia  - s/p Meropenem  - ID follow up noted     # C diff   - c/w vancomycin 125mg Q 6h   - 2 weeks course    # Dysphagia  - Puree with moderately thick liquids  - SLP evaluation noted    # fungemia:  candida galbrata  - c/w caspofungin  - ID consult appreciated    # Atrial Fibrillation  - Metoprolol 25mg q6h  - No anticoagulation given ICH    # anemia: likely multifactorial  - s/p PRBC  - Protonix 40mg BID  - no intervention per GI    DVT ppx  - SCD    Plan of care discussed with patient's girlfriend at bedside.  83M w/ PMHX of HTN, CAD s/p PCI, renal cell ca s/p L nephrectomy, bladder CA, BPH, CHF, LBBB, vertigo,  HLD, 5.5cm AAA s/p post EVAR, paroxysmal afib on Eliquis, Plavix, ASA, h/o HIT, early stage Alzheimer's dementia, trf from [place] 11/10/23 s/p fall found with multicompartmental ICH, s/p R craniectomy 11/10 and eventual cranioplasty 12/7/23. Hospital course complicated by afib with RVR, Klebsiella pneumonia, LUE DVT, multiple subsequent episodes of ICH after attempting to restart Eliquis, now with acute respiratory failure, aspiration pneumonia, shock, hematemesis.    # Encephalopathy, multifactorial. ICH, CVA, ICU, possible delirium  Waxing, waning  - Needs frequent reorientation  - Neurology signed off    # ICH s/p craniotomy and cranioplasty  # Abnormal EEG  - Potential epileptogenic focus in the right anterior head region with underlying cerebral dysfunction and evidence for overlying skull defect  - Family concerned about Antiseizure medications making patient lethargic and confused  - Had long conversation with family, they request to stop antiseizure medication.   - Family understands risks that patient may have a seizure which can lead to status epilepticus or even death.  - Family agreeable to decreasing dose of antiseizure medication.  - Vimpat 50mg BID    # Aspiration pneumonia  - Aspiration precautions  - Monitor for hypoxia  - s/p Meropenem  - ID follow up noted     # C diff   - c/w vancomycin 125mg Q 6h   - 2 weeks course    # Dysphagia  - Puree with moderately thick liquids  - SLP evaluation noted    # fungemia:  candida galbrata  - c/w caspofungin  - ID consult appreciated    # Atrial Fibrillation  - Metoprolol 25mg q6h  - No anticoagulation given ICH    # anemia: likely multifactorial  - s/p PRBC  - Protonix 40mg BID  - no intervention per GI    DVT ppx  - SCD    Plan of care discussed with patient's girlfriend at bedside.

## 2024-01-06 NOTE — PROGRESS NOTE ADULT - SUBJECTIVE AND OBJECTIVE BOX
Chief complaint: ICH    Patient seen and examined at bedside. No acute overnight events reported. Unable to provide significant ROS due to mental status.     Vital Signs Last 24 Hrs  T(F): 97.6 (06 Jan 2024 08:40), Max: 98.4 (05 Jan 2024 20:00)  HR: 86 (06 Jan 2024 08:40) (81 - 94)  BP: 130/72 (06 Jan 2024 08:40) (130/71 - 155/76)  RR: 17 (06 Jan 2024 08:40) (17 - 19)  SpO2: 97% (06 Jan 2024 08:40) (92% - 97%)    Physical Exam:  Constitutional: alert, lethargic, in no acute distress   Neck: Soft   Respiratory: Clear to auscultation bilaterally  Cardiovascular: Regular rate and rhythm  Gastrointestinal: Soft  Musculoskeletal: no lower extremity edema bilaterally    Labs:                        9.1    8.75  )-----------( 261      ( 06 Jan 2024 07:40 )             27.9   01-06    134<L>  |  99  |  17.0  ----------------------------<  107<H>  4.1   |  23.0  |  0.69    Ca    9.1      06 Jan 2024 07:40  Mg     1.6     01-06    TPro  6.4<L>  /  Alb  2.5<L>  /  TBili  0.3<L>  /  DBili  x   /  AST  14  /  ALT  12  /  AlkPhos  90  01-06

## 2024-01-07 LAB
ANION GAP SERPL CALC-SCNC: 13 MMOL/L — SIGNIFICANT CHANGE UP (ref 5–17)
ANION GAP SERPL CALC-SCNC: 13 MMOL/L — SIGNIFICANT CHANGE UP (ref 5–17)
BASOPHILS # BLD AUTO: 0.05 K/UL — SIGNIFICANT CHANGE UP (ref 0–0.2)
BASOPHILS # BLD AUTO: 0.05 K/UL — SIGNIFICANT CHANGE UP (ref 0–0.2)
BASOPHILS NFR BLD AUTO: 0.7 % — SIGNIFICANT CHANGE UP (ref 0–2)
BASOPHILS NFR BLD AUTO: 0.7 % — SIGNIFICANT CHANGE UP (ref 0–2)
BUN SERPL-MCNC: 19.6 MG/DL — SIGNIFICANT CHANGE UP (ref 8–20)
BUN SERPL-MCNC: 19.6 MG/DL — SIGNIFICANT CHANGE UP (ref 8–20)
CALCIUM SERPL-MCNC: 9.1 MG/DL — SIGNIFICANT CHANGE UP (ref 8.4–10.5)
CALCIUM SERPL-MCNC: 9.1 MG/DL — SIGNIFICANT CHANGE UP (ref 8.4–10.5)
CHLORIDE SERPL-SCNC: 102 MMOL/L — SIGNIFICANT CHANGE UP (ref 96–108)
CHLORIDE SERPL-SCNC: 102 MMOL/L — SIGNIFICANT CHANGE UP (ref 96–108)
CO2 SERPL-SCNC: 22 MMOL/L — SIGNIFICANT CHANGE UP (ref 22–29)
CO2 SERPL-SCNC: 22 MMOL/L — SIGNIFICANT CHANGE UP (ref 22–29)
CREAT SERPL-MCNC: 0.74 MG/DL — SIGNIFICANT CHANGE UP (ref 0.5–1.3)
CREAT SERPL-MCNC: 0.74 MG/DL — SIGNIFICANT CHANGE UP (ref 0.5–1.3)
EGFR: 90 ML/MIN/1.73M2 — SIGNIFICANT CHANGE UP
EGFR: 90 ML/MIN/1.73M2 — SIGNIFICANT CHANGE UP
EOSINOPHIL # BLD AUTO: 0.09 K/UL — SIGNIFICANT CHANGE UP (ref 0–0.5)
EOSINOPHIL # BLD AUTO: 0.09 K/UL — SIGNIFICANT CHANGE UP (ref 0–0.5)
EOSINOPHIL NFR BLD AUTO: 1.3 % — SIGNIFICANT CHANGE UP (ref 0–6)
EOSINOPHIL NFR BLD AUTO: 1.3 % — SIGNIFICANT CHANGE UP (ref 0–6)
GLUCOSE SERPL-MCNC: 100 MG/DL — HIGH (ref 70–99)
GLUCOSE SERPL-MCNC: 100 MG/DL — HIGH (ref 70–99)
HCT VFR BLD CALC: 30 % — LOW (ref 39–50)
HCT VFR BLD CALC: 30 % — LOW (ref 39–50)
HGB BLD-MCNC: 9.9 G/DL — LOW (ref 13–17)
HGB BLD-MCNC: 9.9 G/DL — LOW (ref 13–17)
IMM GRANULOCYTES NFR BLD AUTO: 0.3 % — SIGNIFICANT CHANGE UP (ref 0–0.9)
IMM GRANULOCYTES NFR BLD AUTO: 0.3 % — SIGNIFICANT CHANGE UP (ref 0–0.9)
LYMPHOCYTES # BLD AUTO: 1.75 K/UL — SIGNIFICANT CHANGE UP (ref 1–3.3)
LYMPHOCYTES # BLD AUTO: 1.75 K/UL — SIGNIFICANT CHANGE UP (ref 1–3.3)
LYMPHOCYTES # BLD AUTO: 25.8 % — SIGNIFICANT CHANGE UP (ref 13–44)
LYMPHOCYTES # BLD AUTO: 25.8 % — SIGNIFICANT CHANGE UP (ref 13–44)
MCHC RBC-ENTMCNC: 30.5 PG — SIGNIFICANT CHANGE UP (ref 27–34)
MCHC RBC-ENTMCNC: 30.5 PG — SIGNIFICANT CHANGE UP (ref 27–34)
MCHC RBC-ENTMCNC: 33 GM/DL — SIGNIFICANT CHANGE UP (ref 32–36)
MCHC RBC-ENTMCNC: 33 GM/DL — SIGNIFICANT CHANGE UP (ref 32–36)
MCV RBC AUTO: 92.3 FL — SIGNIFICANT CHANGE UP (ref 80–100)
MCV RBC AUTO: 92.3 FL — SIGNIFICANT CHANGE UP (ref 80–100)
MONOCYTES # BLD AUTO: 0.55 K/UL — SIGNIFICANT CHANGE UP (ref 0–0.9)
MONOCYTES # BLD AUTO: 0.55 K/UL — SIGNIFICANT CHANGE UP (ref 0–0.9)
MONOCYTES NFR BLD AUTO: 8.1 % — SIGNIFICANT CHANGE UP (ref 2–14)
MONOCYTES NFR BLD AUTO: 8.1 % — SIGNIFICANT CHANGE UP (ref 2–14)
NEUTROPHILS # BLD AUTO: 4.33 K/UL — SIGNIFICANT CHANGE UP (ref 1.8–7.4)
NEUTROPHILS # BLD AUTO: 4.33 K/UL — SIGNIFICANT CHANGE UP (ref 1.8–7.4)
NEUTROPHILS NFR BLD AUTO: 63.8 % — SIGNIFICANT CHANGE UP (ref 43–77)
NEUTROPHILS NFR BLD AUTO: 63.8 % — SIGNIFICANT CHANGE UP (ref 43–77)
PLATELET # BLD AUTO: 228 K/UL — SIGNIFICANT CHANGE UP (ref 150–400)
PLATELET # BLD AUTO: 228 K/UL — SIGNIFICANT CHANGE UP (ref 150–400)
POTASSIUM SERPL-MCNC: 3.9 MMOL/L — SIGNIFICANT CHANGE UP (ref 3.5–5.3)
POTASSIUM SERPL-MCNC: 3.9 MMOL/L — SIGNIFICANT CHANGE UP (ref 3.5–5.3)
POTASSIUM SERPL-SCNC: 3.9 MMOL/L — SIGNIFICANT CHANGE UP (ref 3.5–5.3)
POTASSIUM SERPL-SCNC: 3.9 MMOL/L — SIGNIFICANT CHANGE UP (ref 3.5–5.3)
RBC # BLD: 3.25 M/UL — LOW (ref 4.2–5.8)
RBC # BLD: 3.25 M/UL — LOW (ref 4.2–5.8)
RBC # FLD: 14.5 % — SIGNIFICANT CHANGE UP (ref 10.3–14.5)
RBC # FLD: 14.5 % — SIGNIFICANT CHANGE UP (ref 10.3–14.5)
SODIUM SERPL-SCNC: 137 MMOL/L — SIGNIFICANT CHANGE UP (ref 135–145)
SODIUM SERPL-SCNC: 137 MMOL/L — SIGNIFICANT CHANGE UP (ref 135–145)
WBC # BLD: 6.79 K/UL — SIGNIFICANT CHANGE UP (ref 3.8–10.5)
WBC # BLD: 6.79 K/UL — SIGNIFICANT CHANGE UP (ref 3.8–10.5)
WBC # FLD AUTO: 6.79 K/UL — SIGNIFICANT CHANGE UP (ref 3.8–10.5)
WBC # FLD AUTO: 6.79 K/UL — SIGNIFICANT CHANGE UP (ref 3.8–10.5)

## 2024-01-07 PROCEDURE — 99232 SBSQ HOSP IP/OBS MODERATE 35: CPT

## 2024-01-07 RX ADMIN — Medication 125 MILLIGRAM(S): at 18:01

## 2024-01-07 RX ADMIN — Medication 2 MILLIGRAM(S): at 22:04

## 2024-01-07 RX ADMIN — PANTOPRAZOLE SODIUM 40 MILLIGRAM(S): 20 TABLET, DELAYED RELEASE ORAL at 18:00

## 2024-01-07 RX ADMIN — Medication 125 MILLIGRAM(S): at 02:08

## 2024-01-07 RX ADMIN — CHLORHEXIDINE GLUCONATE 1 APPLICATION(S): 213 SOLUTION TOPICAL at 07:38

## 2024-01-07 RX ADMIN — CASPOFUNGIN ACETATE 260 MILLIGRAM(S): 7 INJECTION, POWDER, LYOPHILIZED, FOR SOLUTION INTRAVENOUS at 11:59

## 2024-01-07 RX ADMIN — LACOSAMIDE 50 MILLIGRAM(S): 50 TABLET ORAL at 05:24

## 2024-01-07 RX ADMIN — Medication 25 MILLIGRAM(S): at 11:58

## 2024-01-07 RX ADMIN — Medication 1 MILLIGRAM(S): at 11:58

## 2024-01-07 RX ADMIN — Medication 25 MILLIGRAM(S): at 05:25

## 2024-01-07 RX ADMIN — Medication 1 TABLET(S): at 11:58

## 2024-01-07 RX ADMIN — Medication 25 MILLIGRAM(S): at 18:01

## 2024-01-07 RX ADMIN — Medication 125 MILLIGRAM(S): at 05:23

## 2024-01-07 RX ADMIN — ATORVASTATIN CALCIUM 80 MILLIGRAM(S): 80 TABLET, FILM COATED ORAL at 22:04

## 2024-01-07 RX ADMIN — Medication 125 MILLIGRAM(S): at 12:29

## 2024-01-07 RX ADMIN — Medication 25 MILLIGRAM(S): at 00:55

## 2024-01-07 RX ADMIN — PANTOPRAZOLE SODIUM 40 MILLIGRAM(S): 20 TABLET, DELAYED RELEASE ORAL at 05:23

## 2024-01-07 NOTE — PROGRESS NOTE ADULT - ATTENDING COMMENTS
82 y/o M admitted for encephalopathy secondary to ICH, aspiration pneumonia. Found to have C. diff infection. Continue PO Vancomycin. Mental status improving.

## 2024-01-07 NOTE — PROGRESS NOTE ADULT - SUBJECTIVE AND OBJECTIVE BOX
Patient is a 83y old  Male who presents with a chief complaint of s/p unwitnessed fall with head strike (06 Jan 2024 11:46)      INTERVAL HPI/OVERNIGHT EVENTS:  No acute events reported overnight.    TODAY:    MEDICATIONS  (STANDING):  atorvastatin 80 milliGRAM(s) Oral at bedtime  caspofungin IVPB      caspofungin IVPB 50 milliGRAM(s) IV Intermittent every 24 hours  chlorhexidine 2% Cloths 1 Application(s) Topical <User Schedule>  doxazosin 2 milliGRAM(s) Oral at bedtime  folic acid 1 milliGRAM(s) Oral daily  lacosamide 50 milliGRAM(s) Oral two times a day  metoprolol tartrate 25 milliGRAM(s) Oral every 6 hours  Nephro-roma 1 Tablet(s) Oral daily  pantoprazole  Injectable 40 milliGRAM(s) IV Push two times a day  vancomycin    Solution 125 milliGRAM(s) Oral every 6 hours    MEDICATIONS  (PRN):  acetaminophen     Tablet .. 650 milliGRAM(s) Oral every 6 hours PRN Temp greater or equal to 38C (100.4F), Mild Pain (1 - 3)  artificial tears (preservative free) Ophthalmic Solution 1 Drop(s) Both EYES every 6 hours PRN Dry Eyes      Allergies    penicillin (Rash)  heparin (Other (Severe))  penicillin (Hives)  Cipro (Other)  Levaquin (Other)  heparin (Other)    Intolerances        REVIEW OF SYSTEMS:  Unable to obtain due to mental status    Vital Signs Last 24 Hrs  T(C): 36.6 (07 Jan 2024 05:17), Max: 36.8 (07 Jan 2024 00:36)  T(F): 97.9 (07 Jan 2024 05:17), Max: 98.2 (07 Jan 2024 00:36)  HR: 85 (07 Jan 2024 05:17) (80 - 95)  BP: 138/74 (07 Jan 2024 05:17) (116/68 - 138/74)  BP(mean): --  RR: 19 (07 Jan 2024 05:17) (16 - 19)  SpO2: 92% (07 Jan 2024 05:17) (92% - 97%)    Parameters below as of 07 Jan 2024 05:17  Patient On (Oxygen Delivery Method): room air        PHYSICAL EXAM:      LABS:                        9.1    8.75  )-----------( 261      ( 06 Jan 2024 07:40 )             27.9     01-06    134<L>  |  99  |  17.0  ----------------------------<  107<H>  4.1   |  23.0  |  0.69    Ca    9.1      06 Jan 2024 07:40  Mg     1.6     01-06    TPro  6.4<L>  /  Alb  2.5<L>  /  TBili  0.3<L>  /  DBili  x   /  AST  14  /  ALT  12  /  AlkPhos  90  01-06      Urinalysis Basic - ( 06 Jan 2024 07:40 )    Color: x / Appearance: x / SG: x / pH: x  Gluc: 107 mg/dL / Ketone: x  / Bili: x / Urobili: x   Blood: x / Protein: x / Nitrite: x   Leuk Esterase: x / RBC: x / WBC x   Sq Epi: x / Non Sq Epi: x / Bacteria: x      CAPILLARY BLOOD GLUCOSE          RADIOLOGY & ADDITIONAL TESTS:    Imaging Personally Reviewed:  [ ] YES  [ ] NO    Consultant(s) Notes Reviewed:  [ ] YES  [ ] NO    Care Discussed with Consultants/Other Providers [ ] YES  [ ] NO    Plan of Care discussed with Housestaff [ ]YES [ ] NO Patient is a 83y old  Male who presents with a chief complaint of s/p unwitnessed fall with head strike (06 Jan 2024 11:46)      INTERVAL HPI/OVERNIGHT EVENTS:  No acute events reported overnight.    TODAY:  Seen and examined bedside. Patient more awake today. Answering simple questions, follows simple commands.  Denies Chest pain, abdominal pain, shortness of breath, fevers    MEDICATIONS  (STANDING):  atorvastatin 80 milliGRAM(s) Oral at bedtime  caspofungin IVPB      caspofungin IVPB 50 milliGRAM(s) IV Intermittent every 24 hours  chlorhexidine 2% Cloths 1 Application(s) Topical <User Schedule>  doxazosin 2 milliGRAM(s) Oral at bedtime  folic acid 1 milliGRAM(s) Oral daily  lacosamide 50 milliGRAM(s) Oral two times a day  metoprolol tartrate 25 milliGRAM(s) Oral every 6 hours  Nephro-roma 1 Tablet(s) Oral daily  pantoprazole  Injectable 40 milliGRAM(s) IV Push two times a day  vancomycin    Solution 125 milliGRAM(s) Oral every 6 hours    MEDICATIONS  (PRN):  acetaminophen     Tablet .. 650 milliGRAM(s) Oral every 6 hours PRN Temp greater or equal to 38C (100.4F), Mild Pain (1 - 3)  artificial tears (preservative free) Ophthalmic Solution 1 Drop(s) Both EYES every 6 hours PRN Dry Eyes      Allergies    penicillin (Rash)  heparin (Other (Severe))  penicillin (Hives)  Cipro (Other)  Levaquin (Other)  heparin (Other)    Intolerances        REVIEW OF SYSTEMS:  Review of systems is otherwise negative unless mentioned in HPI above.      Vital Signs Last 24 Hrs  T(C): 36.6 (07 Jan 2024 05:17), Max: 36.8 (07 Jan 2024 00:36)  T(F): 97.9 (07 Jan 2024 05:17), Max: 98.2 (07 Jan 2024 00:36)  HR: 85 (07 Jan 2024 05:17) (80 - 95)  BP: 138/74 (07 Jan 2024 05:17) (116/68 - 138/74)  BP(mean): --  RR: 19 (07 Jan 2024 05:17) (16 - 19)  SpO2: 92% (07 Jan 2024 05:17) (92% - 97%)    Parameters below as of 07 Jan 2024 05:17  Patient On (Oxygen Delivery Method): room air        PHYSICAL EXAM:  CONSTITUTIONAL: NAD, A&O x2, somnolent arousable to voice  ENMT: Healed surgical scar  RESPIRATORY: Normal respiratory effort; lungs are clear to auscultation bilaterally  CARDIOVASCULAR: Regular rate and rhythm, normal S1 and S2, no murmur/rub/gallop.  ABDOMEN: Nontender to palpation, no rebound/guarding; No hepatosplenomegaly  MUSCLOSKELETAL:  No edema  NEUROLOGY: Awake, oriented x 2. Left sided hemiplegia   skin : stage 2 sacral decub       LABS:                        9.1    8.75  )-----------( 261      ( 06 Jan 2024 07:40 )             27.9     01-06    134<L>  |  99  |  17.0  ----------------------------<  107<H>  4.1   |  23.0  |  0.69    Ca    9.1      06 Jan 2024 07:40  Mg     1.6     01-06    TPro  6.4<L>  /  Alb  2.5<L>  /  TBili  0.3<L>  /  DBili  x   /  AST  14  /  ALT  12  /  AlkPhos  90  01-06      Urinalysis Basic - ( 06 Jan 2024 07:40 )    Color: x / Appearance: x / SG: x / pH: x  Gluc: 107 mg/dL / Ketone: x  / Bili: x / Urobili: x   Blood: x / Protein: x / Nitrite: x   Leuk Esterase: x / RBC: x / WBC x   Sq Epi: x / Non Sq Epi: x / Bacteria: x      CAPILLARY BLOOD GLUCOSE          RADIOLOGY & ADDITIONAL TESTS:    Imaging Personally Reviewed:  [ ] YES  [ ] NO    Consultant(s) Notes Reviewed:  [ ] YES  [ ] NO    Care Discussed with Consultants/Other Providers [ ] YES  [ ] NO    Plan of Care discussed with Housestaff [ ]YES [ ] NO

## 2024-01-07 NOTE — PROGRESS NOTE ADULT - ASSESSMENT
83M w/ PMHX of HTN, CAD s/p PCI, renal cell ca s/p L nephrectomy, bladder CA, BPH, CHF, LBBB, vertigo,  HLD, 5.5cm AAA s/p post EVAR, paroxysmal afib on Eliquis, Plavix, ASA, h/o HIT, early stage Alzheimer's dementia, trf from [place] 11/10/23 s/p fall found with multicompartmental ICH, s/p R craniectomy 11/10 and eventual cranioplasty 12/7/23. Hospital course complicated by afib with RVR, Klebsiella pneumonia, LUE DVT, multiple subsequent episodes of ICH after attempting to restart Eliquis, now with acute respiratory failure, aspiration pneumonia, shock, hematemesis.    # Encephalopathy, multifactorial. ICH, CVA, ICU, possible delirium  Waxing, waning  - Needs frequent reorientation  - Neurology signed off    # ICH s/p craniotomy and cranioplasty  # Abnormal EEG  - Potential epileptogenic focus in the right anterior head region with underlying cerebral dysfunction and evidence for overlying skull defect  - Family concerned about Antiseizure medications making patient lethargic and confused  - Had long conversation with family, they request to stop antiseizure medication.   - Family understands risks that patient may have a seizure which can lead to status epilepticus or even death.  - Family agreeable to decreasing dose of antiseizure medication.  - Vimpat 50mg BID    # Aspiration pneumonia  - Aspiration precautions  - Monitor for hypoxia  - s/p Meropenem  - ID follow up noted     # C diff   - c/w vancomycin 125mg Q 6h   - 2 weeks course    # Dysphagia  - Puree with moderately thick liquids  - SLP evaluation noted    # fungemia:  candida galbrata  - c/w caspofungin  - ID consult appreciated    # Atrial Fibrillation  - Metoprolol 25mg q6h  - No anticoagulation given ICH    # anemia: likely multifactorial  - s/p PRBC  - Protonix 40mg BID  - no intervention per GI    DVT ppx  - SCD   83M w/ PMHX of HTN, CAD s/p PCI, renal cell ca s/p L nephrectomy, bladder CA, BPH, CHF, LBBB, vertigo,  HLD, 5.5cm AAA s/p post EVAR, paroxysmal afib on Eliquis, Plavix, ASA, h/o HIT, early stage Alzheimer's dementia, trf from [place] 11/10/23 s/p fall found with multicompartmental ICH, s/p R craniectomy 11/10 and eventual cranioplasty 12/7/23. Hospital course complicated by afib with RVR, Klebsiella pneumonia, LUE DVT, multiple subsequent episodes of ICH after attempting to restart Eliquis, now with acute respiratory failure, aspiration pneumonia, shock, hematemesis.    # Encephalopathy, multifactorial. ICH, CVA, ICU, possible delirium  Waxing, waning  - Needs frequent reorientation  - Neurology signed off    # ICH s/p craniotomy and cranioplasty  # Abnormal EEG  - Potential epileptogenic focus in the right anterior head region with underlying cerebral dysfunction and evidence for overlying skull defect  - Family concerned about Antiseizure medications making patient lethargic and confused  - Had long conversation with family, they request to stop antiseizure medication.   - Family understands risks that patient may have a seizure which can lead to status epilepticus or even death.  - Family agreeable to decreasing dose of antiseizure medication.  - Vimpat 50mg BID    # Aspiration pneumonia  - Aspiration precautions  - Monitor for hypoxia  - s/p Meropenem  - ID follow up noted     # C diff   - c/w vancomycin 125mg Q 6h   - 2 weeks course    # Dysphagia  - Puree with moderately thick liquids  - SLP evaluation noted    # fungemia:  candida galbrata  - c/w caspofungin  - ID consult appreciated    # Atrial Fibrillation  - Metoprolol 25mg q6h  - No anticoagulation given ICH    # anemia: likely multifactorial  - s/p PRBC  - Protonix 40mg BID  - no intervention per GI    DVT ppx  - SCD

## 2024-01-08 LAB
ANION GAP SERPL CALC-SCNC: 14 MMOL/L — SIGNIFICANT CHANGE UP (ref 5–17)
ANION GAP SERPL CALC-SCNC: 14 MMOL/L — SIGNIFICANT CHANGE UP (ref 5–17)
BUN SERPL-MCNC: 20.4 MG/DL — HIGH (ref 8–20)
BUN SERPL-MCNC: 20.4 MG/DL — HIGH (ref 8–20)
CALCIUM SERPL-MCNC: 9.5 MG/DL — SIGNIFICANT CHANGE UP (ref 8.4–10.5)
CALCIUM SERPL-MCNC: 9.5 MG/DL — SIGNIFICANT CHANGE UP (ref 8.4–10.5)
CHLORIDE SERPL-SCNC: 100 MMOL/L — SIGNIFICANT CHANGE UP (ref 96–108)
CHLORIDE SERPL-SCNC: 100 MMOL/L — SIGNIFICANT CHANGE UP (ref 96–108)
CO2 SERPL-SCNC: 20 MMOL/L — LOW (ref 22–29)
CO2 SERPL-SCNC: 20 MMOL/L — LOW (ref 22–29)
CREAT SERPL-MCNC: 0.69 MG/DL — SIGNIFICANT CHANGE UP (ref 0.5–1.3)
CREAT SERPL-MCNC: 0.69 MG/DL — SIGNIFICANT CHANGE UP (ref 0.5–1.3)
EGFR: 92 ML/MIN/1.73M2 — SIGNIFICANT CHANGE UP
EGFR: 92 ML/MIN/1.73M2 — SIGNIFICANT CHANGE UP
GLUCOSE SERPL-MCNC: 112 MG/DL — HIGH (ref 70–99)
GLUCOSE SERPL-MCNC: 112 MG/DL — HIGH (ref 70–99)
HCT VFR BLD CALC: 31.1 % — LOW (ref 39–50)
HCT VFR BLD CALC: 31.1 % — LOW (ref 39–50)
HGB BLD-MCNC: 9.9 G/DL — LOW (ref 13–17)
HGB BLD-MCNC: 9.9 G/DL — LOW (ref 13–17)
MAGNESIUM SERPL-MCNC: 1.7 MG/DL — SIGNIFICANT CHANGE UP (ref 1.6–2.6)
MAGNESIUM SERPL-MCNC: 1.7 MG/DL — SIGNIFICANT CHANGE UP (ref 1.6–2.6)
MCHC RBC-ENTMCNC: 30.5 PG — SIGNIFICANT CHANGE UP (ref 27–34)
MCHC RBC-ENTMCNC: 30.5 PG — SIGNIFICANT CHANGE UP (ref 27–34)
MCHC RBC-ENTMCNC: 31.8 GM/DL — LOW (ref 32–36)
MCHC RBC-ENTMCNC: 31.8 GM/DL — LOW (ref 32–36)
MCV RBC AUTO: 95.7 FL — SIGNIFICANT CHANGE UP (ref 80–100)
MCV RBC AUTO: 95.7 FL — SIGNIFICANT CHANGE UP (ref 80–100)
PLATELET # BLD AUTO: 180 K/UL — SIGNIFICANT CHANGE UP (ref 150–400)
PLATELET # BLD AUTO: 180 K/UL — SIGNIFICANT CHANGE UP (ref 150–400)
POTASSIUM SERPL-MCNC: 4.7 MMOL/L — SIGNIFICANT CHANGE UP (ref 3.5–5.3)
POTASSIUM SERPL-MCNC: 4.7 MMOL/L — SIGNIFICANT CHANGE UP (ref 3.5–5.3)
POTASSIUM SERPL-SCNC: 4.7 MMOL/L — SIGNIFICANT CHANGE UP (ref 3.5–5.3)
POTASSIUM SERPL-SCNC: 4.7 MMOL/L — SIGNIFICANT CHANGE UP (ref 3.5–5.3)
RBC # BLD: 3.25 M/UL — LOW (ref 4.2–5.8)
RBC # BLD: 3.25 M/UL — LOW (ref 4.2–5.8)
RBC # FLD: 14.6 % — HIGH (ref 10.3–14.5)
RBC # FLD: 14.6 % — HIGH (ref 10.3–14.5)
SODIUM SERPL-SCNC: 134 MMOL/L — LOW (ref 135–145)
SODIUM SERPL-SCNC: 134 MMOL/L — LOW (ref 135–145)
WBC # BLD: 6.76 K/UL — SIGNIFICANT CHANGE UP (ref 3.8–10.5)
WBC # BLD: 6.76 K/UL — SIGNIFICANT CHANGE UP (ref 3.8–10.5)
WBC # FLD AUTO: 6.76 K/UL — SIGNIFICANT CHANGE UP (ref 3.8–10.5)
WBC # FLD AUTO: 6.76 K/UL — SIGNIFICANT CHANGE UP (ref 3.8–10.5)

## 2024-01-08 PROCEDURE — 99232 SBSQ HOSP IP/OBS MODERATE 35: CPT

## 2024-01-08 RX ORDER — LACOSAMIDE 50 MG/1
50 TABLET ORAL
Refills: 0 | Status: DISCONTINUED | OUTPATIENT
Start: 2024-01-08 | End: 2024-01-12

## 2024-01-08 RX ADMIN — Medication 25 MILLIGRAM(S): at 17:36

## 2024-01-08 RX ADMIN — Medication 25 MILLIGRAM(S): at 11:50

## 2024-01-08 RX ADMIN — Medication 125 MILLIGRAM(S): at 01:07

## 2024-01-08 RX ADMIN — PANTOPRAZOLE SODIUM 40 MILLIGRAM(S): 20 TABLET, DELAYED RELEASE ORAL at 05:19

## 2024-01-08 RX ADMIN — CHLORHEXIDINE GLUCONATE 1 APPLICATION(S): 213 SOLUTION TOPICAL at 05:19

## 2024-01-08 RX ADMIN — Medication 1 MILLIGRAM(S): at 11:50

## 2024-01-08 RX ADMIN — PANTOPRAZOLE SODIUM 40 MILLIGRAM(S): 20 TABLET, DELAYED RELEASE ORAL at 17:36

## 2024-01-08 RX ADMIN — Medication 125 MILLIGRAM(S): at 17:36

## 2024-01-08 RX ADMIN — Medication 1 TABLET(S): at 11:49

## 2024-01-08 RX ADMIN — Medication 125 MILLIGRAM(S): at 05:19

## 2024-01-08 RX ADMIN — CASPOFUNGIN ACETATE 260 MILLIGRAM(S): 7 INJECTION, POWDER, LYOPHILIZED, FOR SOLUTION INTRAVENOUS at 11:50

## 2024-01-08 RX ADMIN — Medication 25 MILLIGRAM(S): at 05:18

## 2024-01-08 RX ADMIN — Medication 25 MILLIGRAM(S): at 01:07

## 2024-01-08 RX ADMIN — Medication 125 MILLIGRAM(S): at 11:50

## 2024-01-08 NOTE — PROGRESS NOTE ADULT - ASSESSMENT
83M w/ PMHX of HTN, CAD s/p PCI, renal cell ca s/p L nephrectomy, bladder CA, BPH, CHF, LBBB, vertigo,  HLD, 5.5cm AAA s/p post EVAR, paroxysmal afib on Eliquis, Plavix, ASA, h/o HIT, early stage Alzheimer's dementia, trf from [place] 11/10/23 s/p fall found with multicompartmental ICH, s/p R craniectomy 11/10 and eventual cranioplasty 12/7/23. Hospital course complicated by afib with RVR, Klebsiella pneumonia, LUE DVT, multiple subsequent episodes of ICH after attempting to restart Eliquis, now with acute respiratory failure, aspiration pneumonia, shock, hematemesis.    # Encephalopathy, multifactorial. ICH, CVA, ICU, possible delirium  Waxing, waning  - Needs frequent reorientation  - Neurology signed off    # ICH s/p craniotomy and cranioplasty  # Abnormal EEG  - Potential epileptogenic focus in the right anterior head region with underlying cerebral dysfunction and evidence for overlying skull defect  - Family concerned about Antiseizure medications making patient lethargic and confused  - Had long conversation with family, they request to stop antiseizure medication.   - Family understands risks that patient may have a seizure which can lead to status epilepticus or even death.  - Family agreeable to decreasing dose of antiseizure medication.  - Vimpat 50mg BID  - Will be discontinuing Vimpat as per the request of daughter and HCP, Jennifer who understands the risks and still wants Vimpat to be held    # Aspiration pneumonia  - Aspiration precautions  - Monitor for hypoxia  - s/p Meropenem  - ID follow up noted     # C diff   - c/w vancomycin 125mg Q 6h   - 2 weeks course    # Dysphagia  - Puree with moderately thick liquids  - SLP evaluation noted    # fungemia:  candida galbrata  - c/w caspofungin - anticipating 2 weeks course  - ID consult appreciated    # Atrial Fibrillation  - Metoprolol 25mg q6h  - No anticoagulation given ICH    # anemia: likely multifactorial  - s/p PRBC  - Protonix 40mg BID  - no intervention per GI    DVT ppx  - SCD   83M w/ PMHX of HTN, CAD s/p PCI, renal cell ca s/p L nephrectomy, bladder CA, BPH, CHF, LBBB, vertigo,  HLD, 5.5cm AAA s/p post EVAR, paroxysmal afib on Eliquis, Plavix, ASA, h/o HIT, early stage Alzheimer's dementia, trf from [place] 11/10/23 s/p fall found with multicompartmental ICH, s/p R craniectomy 11/10 and eventual cranioplasty 12/7/23. Hospital course complicated by afib with RVR, Klebsiella pneumonia, LUE DVT, multiple subsequent episodes of ICH after attempting to restart Eliquis, now with acute respiratory failure, aspiration pneumonia, shock, hematemesis.    # Encephalopathy, multifactorial. ICH, CVA, ICU, possible delirium  Waxing, waning  - Needs frequent reorientation  - Neurology signed off    # ICH s/p craniotomy and cranioplasty  # Abnormal EEG  - Potential epileptogenic focus in the right anterior head region with underlying cerebral dysfunction and evidence for overlying skull defect  - Family concerned about Antiseizure medications making patient lethargic and confused  - Had long conversation with family, they request to stop antiseizure medication.   - Family understands risks that patient may have a seizure which can lead to status epilepticus or even death.  - Family agreeable to decreasing dose of antiseizure medication.  - Vimpat 50mg BID  - Will be discontinuing Vimpat as per the request of daughter and HCP, Jennifer who understands the risks and still wants Vimpat to be held    # Aspiration pneumonia  - Aspiration precautions  - Monitor for hypoxia  - s/p Meropenem  - ID follow up noted     # C diff   - c/w vancomycin 125mg Q 6h   - 2 weeks course    # Dysphagia  - Puree with moderately thick liquids  - SLP evaluation noted    # fungemia:  candida galbrata  - c/w caspofungin - anticipating 2 weeks course  - ID consult appreciated    # Atrial Fibrillation  - Metoprolol 25mg q6h  - No anticoagulation given ICH    # anemia: likely multifactorial  - s/p PRBC  - Protonix 40mg BID  - no intervention per GI    DVT ppx  - SCD

## 2024-01-08 NOTE — PROGRESS NOTE ADULT - ATTENDING COMMENTS
82 y/o M admitted for encephalopathy secondary to ICH, aspiration pneumonia. Found to have C. diff infection. Continue PO Vancomycin. Mental status improved after holding Vimpat. Family does not want antiseizure medications at this time. 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 Found to have C. diff infection. Continue PO Vancomycin. Mental status improved after holding Vimpat. Family does not want antiseizure medications at this time.

## 2024-01-08 NOTE — PROGRESS NOTE ADULT - ASSESSMENT
This is a 83ym  hx of fall with multi compartmental ICH 11/10 right hemicraniectomy  12/7 right cranioplasty,   AFib with RVR, Pneumonia-klebsiella, DVT, resp failure with aspiration pneumonia, delirium.   Pt had a ct with increase heme with eliquis 12/15. Bacteremia hx.  Pmhx: HTN, CAD stents asa/plavix hx, Bladder CA, BPH, CHF LBBB, vertigo, AAA without rupture    Plan   -cranioplasty with EEG abnorm   # Encephalopathy, multifactorial. ICH, CVA, ICU, possible delirium  Waxing, waning  - Needs frequent reorientation  - Neurology signed off    # ICH s/p craniotomy and cranioplasty  # Abnormal EEG  - Potential epileptogenic focus in the right anterior head region with underlying cerebral dysfunction and evidence for overlying skull defect  - Family concerned about Antiseizure medications making patient lethargic and confused  - Had long conversation with family, they request to stop antiseizure medication.   - Family understands risks that patient may have a seizure which can lead to status epilepticus or even death.  - Family agreeable to decreasing dose of antiseizure medication.  - Vimpat 50mg BID    # Aspiration pneumonia  - Aspiration precautions  - Monitor for hypoxia  - s/p Meropenem  - ID follow up noted     # C diff   - c/w vancomycin 125mg Q 6h   - 2 weeks course    # Dysphagia  - Puree with moderately thick liquids  - SLP evaluation noted    # fungemia:  candida galbrata  - c/w caspofungin  - ID consult appreciated    # Atrial Fibrillation  - Metoprolol 25mg q6h  - No anticoagulation given ICH    # anemia: likely multifactorial  - s/p PRBC  - Protonix 40mg BID  - no intervention per GI    DVT ppx  - SCD       This is a 83ym  hx of fall with multi compartmental ICH 11/10 right hemicraniectomy  12/7 right cranioplasty,   AFib with RVR, Pneumonia-klebsiella, DVT, resp failure with aspiration pneumonia, delirium.   Pt had a ct with increase heme with eliquis 12/15. Bacteremia hx.  Pmhx: HTN, CAD stents asa/plavix hx, Bladder CA, BPH, CHF LBBB, vertigo, AAA without rupture    Plan   -cranioplasty with EEG abnorm- Vimpat 50mg BID  - bacteriemia- on anbx- Fungemia-  Cancidas. ID following   - c/w vancomycin 125mg Q 6h -2 weeks course  -diet Dysphagia- Puree with moderately thick liquids  - SLP evaluation noted  -Atrial Fibrillation- Metoprolol 25mg q6h  -DVT ppx  - Neurosurgery will continue to follow at this time the pt does not have any specific active neurosurgerical intervention needed.

## 2024-01-08 NOTE — PROGRESS NOTE ADULT - SUBJECTIVE AND OBJECTIVE BOX
Patient is a 83y old  Male who presents with a chief complaint of s/p unwitnessed fall with head strike (08 Jan 2024 10:06)      INTERVAL HPI/OVERNIGHT EVENTS:  No acute events reported overnight.  Vimpat was held due to family request    TODAY:  Seen and examined bedside. Awake and alert. Answering simple questions, follows simple commands.  Denies Chest pain, abdominal pain, shortness of breath, fevers    MEDICATIONS  (STANDING):  atorvastatin 80 milliGRAM(s) Oral at bedtime  caspofungin IVPB      caspofungin IVPB 50 milliGRAM(s) IV Intermittent every 24 hours  chlorhexidine 2% Cloths 1 Application(s) Topical <User Schedule>  doxazosin 2 milliGRAM(s) Oral at bedtime  folic acid 1 milliGRAM(s) Oral daily  lacosamide 50 milliGRAM(s) Oral two times a day  metoprolol tartrate 25 milliGRAM(s) Oral every 6 hours  Nephro-roma 1 Tablet(s) Oral daily  pantoprazole  Injectable 40 milliGRAM(s) IV Push two times a day  vancomycin    Solution 125 milliGRAM(s) Oral every 6 hours    MEDICATIONS  (PRN):  acetaminophen     Tablet .. 650 milliGRAM(s) Oral every 6 hours PRN Temp greater or equal to 38C (100.4F), Mild Pain (1 - 3)  artificial tears (preservative free) Ophthalmic Solution 1 Drop(s) Both EYES every 6 hours PRN Dry Eyes      Allergies    penicillin (Rash)  heparin (Other (Severe))  penicillin (Hives)  Cipro (Other)  Levaquin (Other)  heparin (Other)    Intolerances        REVIEW OF SYSTEMS:      Vital Signs Last 24 Hrs  T(C): 37 (08 Jan 2024 08:26), Max: 37 (08 Jan 2024 08:26)  T(F): 98.6 (08 Jan 2024 08:26), Max: 98.6 (08 Jan 2024 08:26)  HR: 82 (08 Jan 2024 09:00) (80 - 97)  BP: 116/69 (08 Jan 2024 08:26) (112/63 - 138/76)  BP(mean): --  RR: 18 (08 Jan 2024 08:26) (18 - 20)  SpO2: 99% (08 Jan 2024 09:00) (94% - 99%)    Parameters below as of 08 Jan 2024 09:00  Patient On (Oxygen Delivery Method): room air        PHYSICAL EXAM:      LABS:                        9.9    6.76  )-----------( 180      ( 08 Jan 2024 07:22 )             31.1     01-08    134<L>  |  100  |  20.4<H>  ----------------------------<  112<H>  4.7   |  20.0<L>  |  0.69    Ca    9.5      08 Jan 2024 07:22  Mg     1.7     01-08        Urinalysis Basic - ( 08 Jan 2024 07:22 )    Color: x / Appearance: x / SG: x / pH: x  Gluc: 112 mg/dL / Ketone: x  / Bili: x / Urobili: x   Blood: x / Protein: x / Nitrite: x   Leuk Esterase: x / RBC: x / WBC x   Sq Epi: x / Non Sq Epi: x / Bacteria: x      CAPILLARY BLOOD GLUCOSE          RADIOLOGY & ADDITIONAL TESTS:    Imaging Personally Reviewed:  [ ] YES  [ ] NO    Consultant(s) Notes Reviewed:  [ ] YES  [ ] NO    Care Discussed with Consultants/Other Providers [ ] YES  [ ] NO    Plan of Care discussed with Housestaff [ ]YES [ ] NO

## 2024-01-08 NOTE — PROGRESS NOTE ADULT - SUBJECTIVE AND OBJECTIVE BOX
INTERVAL HPI/OVERNIGHT EVENTS:  82y M with PMH HTN, CAD s/p stents, renal cell carcinoma status post left nephrectomy, bladder CA, BPH, CHF, LBBB, vertigo,  HLD, 5.5cm AAA without rupture post EVAR, paroxysmal A-fib on Eliquis, Plavix, and aspirin, early stage Alzheimer dementia transferred from [facility] s/p unwitnessed fall with head strike at home found by wife on floor at 8am. Patient brought to urgent care by wife and had 5 staples to posterior scalp but was instructed to go to nearest ED.  CT head at [place] showed R frontal IPH, SDH, SAH at 9:00. CTA stroke protocol not performed at [facility]. Patient BIB on 6L NC.  Pt GCS 15 on arrival, A&Ox2 on arrival. After initial assessment, patient noted to be vomiting enroute to CT scanner.     Interval/Overnight    Pt seen today. Pt was sitting upright with his head somewhat flexed forward. When asking the patient questions his voice is hypophonic and he states he is in senior care.  It is difficult to understand all what is said due to the voice being hypophonic. PT states he is ok.      MEDICATIONS  (STANDING):  atorvastatin 80 milliGRAM(s) Oral at bedtime  caspofungin IVPB      caspofungin IVPB 50 milliGRAM(s) IV Intermittent every 24 hours  chlorhexidine 2% Cloths 1 Application(s) Topical <User Schedule>  doxazosin 2 milliGRAM(s) Oral at bedtime  folic acid 1 milliGRAM(s) Oral daily  metoprolol tartrate 25 milliGRAM(s) Oral every 6 hours  Nephro-roma 1 Tablet(s) Oral daily  pantoprazole  Injectable 40 milliGRAM(s) IV Push two times a day  vancomycin    Solution 125 milliGRAM(s) Oral every 6 hours    MEDICATIONS  (PRN):  acetaminophen     Tablet .. 650 milliGRAM(s) Oral every 6 hours PRN Temp greater or equal to 38C (100.4F), Mild Pain (1 - 3)  artificial tears (preservative free) Ophthalmic Solution 1 Drop(s) Both EYES every 6 hours PRN Dry Eyes    Allergies  penicillin (Rash)  heparin (Other (Severe))  penicillin (Hives)  Cipro (Other)  Levaquin (Other)  heparin (Other)    Intolerances      Vital Signs Last 24 Hrs  T(C): 37 (08 Jan 2024 08:26), Max: 37 (08 Jan 2024 08:26)  T(F): 98.6 (08 Jan 2024 08:26), Max: 98.6 (08 Jan 2024 08:26)  HR: 82 (08 Jan 2024 09:00) (80 - 97)  BP: 116/69 (08 Jan 2024 08:26) (112/63 - 138/76)  BP(mean): --  RR: 18 (08 Jan 2024 08:26) (16 - 20)  SpO2: 99% (08 Jan 2024 09:00) (94% - 99%)    Parameters below as of 08 Jan 2024 09:00  Patient On (Oxygen Delivery Method): room air        PHYSICAL EXAM  GENERAL: NAD,   HEAD:   Normocephalic, healing wound.   EYES: EOMI, PERRLA, conjunctiva and sclera clear  ENMT: No tonsillar erythema, exudates, or enlargement; Moist mucous membranes, Good dentition, No lesions  NECK: Supple,  NERVOUS SYSTEM:  Alert & Oriented self; Motor Strength pos right lower extrem weakness -2/5 rue extremities; left upper and lower 4/5,  DTRs 2+ intact and symmetric  CHEST/LUNG: Clear bilat  HEART: Regular rate and rhythm;   ABDOMEN: Soft, Nontender, Nondistended; Bowel sounds present  EXTREMITIES:  2+ Peripheral Pulses, No edema        LABS:                          9.9    6.76  )-----------( 180      ( 08 Jan 2024 07:22 )             31.1     01-08    134<L>  |  100  |  20.4<H>  ----------------------------<  112<H>  4.7   |  20.0<L>  |  0.69    Ca    9.5      08 Jan 2024 07:22  Mg     1.7     01-08    Urinalysis Basic - ( 08 Jan 2024 07:22 )    Color: x / Appearance: x / SG: x / pH: x  Gluc: 112 mg/dL / Ketone: x  / Bili: x / Urobili: x   Blood: x / Protein: x / Nitrite: x   Leuk Esterase: x / RBC: x / WBC x   Sq Epi: x / Non Sq Epi: x / Bacteria: x    I&O's Detail    07 Jan 2024 07:01  -  08 Jan 2024 07:00  --------------------------------------------------------  IN:  Total IN: 0 mL    OUT:    Indwelling Catheter - Urethral (mL): 1575 mL  Total OUT: 1575 mL    Total NET: -1575 mL      RADIOLOGY & ADDITIONAL TESTS:  < from: CT Head No Cont (12.22.23 @ 03:51) >  PROCEDURE DATE:  12/22/2023    IMPRESSION:  Interval decrease in size of the simple density fluid collection, likely   either a pseudomeningocele or seroma, superficial to the right-sided   cranioplasty.  No other significant change.  --- End of Report ---     INTERVAL HPI/OVERNIGHT EVENTS:  82y M with PMH HTN, CAD s/p stents, renal cell carcinoma status post left nephrectomy, bladder CA, BPH, CHF, LBBB, vertigo,  HLD, 5.5cm AAA without rupture post EVAR, paroxysmal A-fib on Eliquis, Plavix, and aspirin, early stage Alzheimer dementia transferred from [facility] s/p unwitnessed fall with head strike at home found by wife on floor at 8am. Patient brought to urgent care by wife and had 5 staples to posterior scalp but was instructed to go to nearest ED.  CT head at [place] showed R frontal IPH, SDH, SAH at 9:00. CTA stroke protocol not performed at [facility]. Patient BIB on 6L NC.  Pt GCS 15 on arrival, A&Ox2 on arrival. After initial assessment, patient noted to be vomiting enroute to CT scanner.     Interval/Overnight    Pt seen today. Pt was sitting upright with his head somewhat flexed forward. When asking the patient questions his voice is hypophonic and he states he is in correction.  It is difficult to understand all what is said due to the voice being hypophonic. PT states he is ok.      MEDICATIONS  (STANDING):  atorvastatin 80 milliGRAM(s) Oral at bedtime  caspofungin IVPB      caspofungin IVPB 50 milliGRAM(s) IV Intermittent every 24 hours  chlorhexidine 2% Cloths 1 Application(s) Topical <User Schedule>  doxazosin 2 milliGRAM(s) Oral at bedtime  folic acid 1 milliGRAM(s) Oral daily  metoprolol tartrate 25 milliGRAM(s) Oral every 6 hours  Nephro-roma 1 Tablet(s) Oral daily  pantoprazole  Injectable 40 milliGRAM(s) IV Push two times a day  vancomycin    Solution 125 milliGRAM(s) Oral every 6 hours    MEDICATIONS  (PRN):  acetaminophen     Tablet .. 650 milliGRAM(s) Oral every 6 hours PRN Temp greater or equal to 38C (100.4F), Mild Pain (1 - 3)  artificial tears (preservative free) Ophthalmic Solution 1 Drop(s) Both EYES every 6 hours PRN Dry Eyes    Allergies  penicillin (Rash)  heparin (Other (Severe))  penicillin (Hives)  Cipro (Other)  Levaquin (Other)  heparin (Other)    Intolerances      Vital Signs Last 24 Hrs  T(C): 37 (08 Jan 2024 08:26), Max: 37 (08 Jan 2024 08:26)  T(F): 98.6 (08 Jan 2024 08:26), Max: 98.6 (08 Jan 2024 08:26)  HR: 82 (08 Jan 2024 09:00) (80 - 97)  BP: 116/69 (08 Jan 2024 08:26) (112/63 - 138/76)  BP(mean): --  RR: 18 (08 Jan 2024 08:26) (16 - 20)  SpO2: 99% (08 Jan 2024 09:00) (94% - 99%)    Parameters below as of 08 Jan 2024 09:00  Patient On (Oxygen Delivery Method): room air        PHYSICAL EXAM  GENERAL: NAD,   HEAD:   Normocephalic, healing wound.   EYES: EOMI, PERRLA, conjunctiva and sclera clear  ENMT: No tonsillar erythema, exudates, or enlargement; Moist mucous membranes, Good dentition, No lesions  NECK: Supple,  NERVOUS SYSTEM:  Alert & Oriented self; Motor Strength pos right lower extrem weakness -2/5 rue extremities; left upper and lower 4/5,  DTRs 2+ intact and symmetric  CHEST/LUNG: Clear bilat  HEART: Regular rate and rhythm;   ABDOMEN: Soft, Nontender, Nondistended; Bowel sounds present  EXTREMITIES:  2+ Peripheral Pulses, No edema        LABS:                          9.9    6.76  )-----------( 180      ( 08 Jan 2024 07:22 )             31.1     01-08    134<L>  |  100  |  20.4<H>  ----------------------------<  112<H>  4.7   |  20.0<L>  |  0.69    Ca    9.5      08 Jan 2024 07:22  Mg     1.7     01-08    Urinalysis Basic - ( 08 Jan 2024 07:22 )    Color: x / Appearance: x / SG: x / pH: x  Gluc: 112 mg/dL / Ketone: x  / Bili: x / Urobili: x   Blood: x / Protein: x / Nitrite: x   Leuk Esterase: x / RBC: x / WBC x   Sq Epi: x / Non Sq Epi: x / Bacteria: x    I&O's Detail    07 Jan 2024 07:01  -  08 Jan 2024 07:00  --------------------------------------------------------  IN:  Total IN: 0 mL    OUT:    Indwelling Catheter - Urethral (mL): 1575 mL  Total OUT: 1575 mL    Total NET: -1575 mL      RADIOLOGY & ADDITIONAL TESTS:  < from: CT Head No Cont (12.22.23 @ 03:51) >  PROCEDURE DATE:  12/22/2023    IMPRESSION:  Interval decrease in size of the simple density fluid collection, likely   either a pseudomeningocele or seroma, superficial to the right-sided   cranioplasty.  No other significant change.  --- End of Report ---

## 2024-01-09 LAB
ALBUMIN SERPL ELPH-MCNC: 2.6 G/DL — LOW (ref 3.3–5.2)
ALBUMIN SERPL ELPH-MCNC: 2.6 G/DL — LOW (ref 3.3–5.2)
ALP SERPL-CCNC: 94 U/L — SIGNIFICANT CHANGE UP (ref 40–120)
ALP SERPL-CCNC: 94 U/L — SIGNIFICANT CHANGE UP (ref 40–120)
ALT FLD-CCNC: 13 U/L — SIGNIFICANT CHANGE UP
ALT FLD-CCNC: 13 U/L — SIGNIFICANT CHANGE UP
ANION GAP SERPL CALC-SCNC: 13 MMOL/L — SIGNIFICANT CHANGE UP (ref 5–17)
ANION GAP SERPL CALC-SCNC: 13 MMOL/L — SIGNIFICANT CHANGE UP (ref 5–17)
AST SERPL-CCNC: 15 U/L — SIGNIFICANT CHANGE UP
AST SERPL-CCNC: 15 U/L — SIGNIFICANT CHANGE UP
BASOPHILS # BLD AUTO: 0.04 K/UL — SIGNIFICANT CHANGE UP (ref 0–0.2)
BASOPHILS # BLD AUTO: 0.04 K/UL — SIGNIFICANT CHANGE UP (ref 0–0.2)
BASOPHILS NFR BLD AUTO: 0.7 % — SIGNIFICANT CHANGE UP (ref 0–2)
BASOPHILS NFR BLD AUTO: 0.7 % — SIGNIFICANT CHANGE UP (ref 0–2)
BILIRUB SERPL-MCNC: 0.3 MG/DL — LOW (ref 0.4–2)
BILIRUB SERPL-MCNC: 0.3 MG/DL — LOW (ref 0.4–2)
BUN SERPL-MCNC: 18.6 MG/DL — SIGNIFICANT CHANGE UP (ref 8–20)
BUN SERPL-MCNC: 18.6 MG/DL — SIGNIFICANT CHANGE UP (ref 8–20)
CALCIUM SERPL-MCNC: 9.6 MG/DL — SIGNIFICANT CHANGE UP (ref 8.4–10.5)
CALCIUM SERPL-MCNC: 9.6 MG/DL — SIGNIFICANT CHANGE UP (ref 8.4–10.5)
CHLORIDE SERPL-SCNC: 101 MMOL/L — SIGNIFICANT CHANGE UP (ref 96–108)
CHLORIDE SERPL-SCNC: 101 MMOL/L — SIGNIFICANT CHANGE UP (ref 96–108)
CO2 SERPL-SCNC: 22 MMOL/L — SIGNIFICANT CHANGE UP (ref 22–29)
CO2 SERPL-SCNC: 22 MMOL/L — SIGNIFICANT CHANGE UP (ref 22–29)
CREAT SERPL-MCNC: 0.79 MG/DL — SIGNIFICANT CHANGE UP (ref 0.5–1.3)
CREAT SERPL-MCNC: 0.79 MG/DL — SIGNIFICANT CHANGE UP (ref 0.5–1.3)
EGFR: 89 ML/MIN/1.73M2 — SIGNIFICANT CHANGE UP
EGFR: 89 ML/MIN/1.73M2 — SIGNIFICANT CHANGE UP
EOSINOPHIL # BLD AUTO: 0.07 K/UL — SIGNIFICANT CHANGE UP (ref 0–0.5)
EOSINOPHIL # BLD AUTO: 0.07 K/UL — SIGNIFICANT CHANGE UP (ref 0–0.5)
EOSINOPHIL NFR BLD AUTO: 1.2 % — SIGNIFICANT CHANGE UP (ref 0–6)
EOSINOPHIL NFR BLD AUTO: 1.2 % — SIGNIFICANT CHANGE UP (ref 0–6)
GLUCOSE SERPL-MCNC: 93 MG/DL — SIGNIFICANT CHANGE UP (ref 70–99)
GLUCOSE SERPL-MCNC: 93 MG/DL — SIGNIFICANT CHANGE UP (ref 70–99)
HCT VFR BLD CALC: 29.4 % — LOW (ref 39–50)
HCT VFR BLD CALC: 29.4 % — LOW (ref 39–50)
HGB BLD-MCNC: 9.2 G/DL — LOW (ref 13–17)
HGB BLD-MCNC: 9.2 G/DL — LOW (ref 13–17)
IMM GRANULOCYTES NFR BLD AUTO: 0.9 % — SIGNIFICANT CHANGE UP (ref 0–0.9)
IMM GRANULOCYTES NFR BLD AUTO: 0.9 % — SIGNIFICANT CHANGE UP (ref 0–0.9)
LYMPHOCYTES # BLD AUTO: 1.65 K/UL — SIGNIFICANT CHANGE UP (ref 1–3.3)
LYMPHOCYTES # BLD AUTO: 1.65 K/UL — SIGNIFICANT CHANGE UP (ref 1–3.3)
LYMPHOCYTES # BLD AUTO: 29.3 % — SIGNIFICANT CHANGE UP (ref 13–44)
LYMPHOCYTES # BLD AUTO: 29.3 % — SIGNIFICANT CHANGE UP (ref 13–44)
MAGNESIUM SERPL-MCNC: 1.5 MG/DL — LOW (ref 1.6–2.6)
MAGNESIUM SERPL-MCNC: 1.5 MG/DL — LOW (ref 1.6–2.6)
MCHC RBC-ENTMCNC: 29.6 PG — SIGNIFICANT CHANGE UP (ref 27–34)
MCHC RBC-ENTMCNC: 29.6 PG — SIGNIFICANT CHANGE UP (ref 27–34)
MCHC RBC-ENTMCNC: 31.3 GM/DL — LOW (ref 32–36)
MCHC RBC-ENTMCNC: 31.3 GM/DL — LOW (ref 32–36)
MCV RBC AUTO: 94.5 FL — SIGNIFICANT CHANGE UP (ref 80–100)
MCV RBC AUTO: 94.5 FL — SIGNIFICANT CHANGE UP (ref 80–100)
MONOCYTES # BLD AUTO: 0.49 K/UL — SIGNIFICANT CHANGE UP (ref 0–0.9)
MONOCYTES # BLD AUTO: 0.49 K/UL — SIGNIFICANT CHANGE UP (ref 0–0.9)
MONOCYTES NFR BLD AUTO: 8.7 % — SIGNIFICANT CHANGE UP (ref 2–14)
MONOCYTES NFR BLD AUTO: 8.7 % — SIGNIFICANT CHANGE UP (ref 2–14)
NEUTROPHILS # BLD AUTO: 3.34 K/UL — SIGNIFICANT CHANGE UP (ref 1.8–7.4)
NEUTROPHILS # BLD AUTO: 3.34 K/UL — SIGNIFICANT CHANGE UP (ref 1.8–7.4)
NEUTROPHILS NFR BLD AUTO: 59.2 % — SIGNIFICANT CHANGE UP (ref 43–77)
NEUTROPHILS NFR BLD AUTO: 59.2 % — SIGNIFICANT CHANGE UP (ref 43–77)
PLATELET # BLD AUTO: 180 K/UL — SIGNIFICANT CHANGE UP (ref 150–400)
PLATELET # BLD AUTO: 180 K/UL — SIGNIFICANT CHANGE UP (ref 150–400)
POTASSIUM SERPL-MCNC: 4.1 MMOL/L — SIGNIFICANT CHANGE UP (ref 3.5–5.3)
POTASSIUM SERPL-MCNC: 4.1 MMOL/L — SIGNIFICANT CHANGE UP (ref 3.5–5.3)
POTASSIUM SERPL-SCNC: 4.1 MMOL/L — SIGNIFICANT CHANGE UP (ref 3.5–5.3)
POTASSIUM SERPL-SCNC: 4.1 MMOL/L — SIGNIFICANT CHANGE UP (ref 3.5–5.3)
PROT SERPL-MCNC: 6.7 G/DL — SIGNIFICANT CHANGE UP (ref 6.6–8.7)
PROT SERPL-MCNC: 6.7 G/DL — SIGNIFICANT CHANGE UP (ref 6.6–8.7)
RBC # BLD: 3.11 M/UL — LOW (ref 4.2–5.8)
RBC # BLD: 3.11 M/UL — LOW (ref 4.2–5.8)
RBC # FLD: 14.6 % — HIGH (ref 10.3–14.5)
RBC # FLD: 14.6 % — HIGH (ref 10.3–14.5)
SODIUM SERPL-SCNC: 136 MMOL/L — SIGNIFICANT CHANGE UP (ref 135–145)
SODIUM SERPL-SCNC: 136 MMOL/L — SIGNIFICANT CHANGE UP (ref 135–145)
WBC # BLD: 5.64 K/UL — SIGNIFICANT CHANGE UP (ref 3.8–10.5)
WBC # BLD: 5.64 K/UL — SIGNIFICANT CHANGE UP (ref 3.8–10.5)
WBC # FLD AUTO: 5.64 K/UL — SIGNIFICANT CHANGE UP (ref 3.8–10.5)
WBC # FLD AUTO: 5.64 K/UL — SIGNIFICANT CHANGE UP (ref 3.8–10.5)

## 2024-01-09 PROCEDURE — 99232 SBSQ HOSP IP/OBS MODERATE 35: CPT

## 2024-01-09 RX ADMIN — Medication 25 MILLIGRAM(S): at 17:07

## 2024-01-09 RX ADMIN — Medication 125 MILLIGRAM(S): at 05:23

## 2024-01-09 RX ADMIN — Medication 25 MILLIGRAM(S): at 12:22

## 2024-01-09 RX ADMIN — ATORVASTATIN CALCIUM 80 MILLIGRAM(S): 80 TABLET, FILM COATED ORAL at 00:12

## 2024-01-09 RX ADMIN — Medication 125 MILLIGRAM(S): at 05:30

## 2024-01-09 RX ADMIN — Medication 1 TABLET(S): at 12:21

## 2024-01-09 RX ADMIN — CASPOFUNGIN ACETATE 260 MILLIGRAM(S): 7 INJECTION, POWDER, LYOPHILIZED, FOR SOLUTION INTRAVENOUS at 12:21

## 2024-01-09 RX ADMIN — CHLORHEXIDINE GLUCONATE 1 APPLICATION(S): 213 SOLUTION TOPICAL at 05:23

## 2024-01-09 RX ADMIN — PANTOPRAZOLE SODIUM 40 MILLIGRAM(S): 20 TABLET, DELAYED RELEASE ORAL at 17:07

## 2024-01-09 RX ADMIN — Medication 125 MILLIGRAM(S): at 05:32

## 2024-01-09 RX ADMIN — Medication 1 MILLIGRAM(S): at 12:22

## 2024-01-09 RX ADMIN — Medication 25 MILLIGRAM(S): at 00:12

## 2024-01-09 RX ADMIN — Medication 2 MILLIGRAM(S): at 00:12

## 2024-01-09 RX ADMIN — PANTOPRAZOLE SODIUM 40 MILLIGRAM(S): 20 TABLET, DELAYED RELEASE ORAL at 05:23

## 2024-01-09 RX ADMIN — Medication 125 MILLIGRAM(S): at 12:22

## 2024-01-09 RX ADMIN — Medication 125 MILLIGRAM(S): at 17:03

## 2024-01-09 RX ADMIN — Medication 25 MILLIGRAM(S): at 05:23

## 2024-01-09 NOTE — PROGRESS NOTE ADULT - ASSESSMENT
82y M with PMH HTN, CAD s/p stents, renal cell carcinoma status post left nephrectomy, bladder CA, BPH, CHF, LBBB, vertigo,  HLD, 5.5cm AAA without rupture post EVAR, paroxysmal A-fib on Eliquis, Plavix, and aspirin, early stage Alzheimer dementia transferred from [facility] s/p unwitnessed fall with head strike at home found by wife on floor at 8am. Patient brought to urgent care by wife and had 5 staples to posterior scalp but was instructed to go to nearest ED.  CT head at [place] showed R frontal IPH, SDH, SAH s/p decompressive craniectomy 11/10. Initial TTE with ?  AV vegetation vs calcification vs artifact, repeat TTE 11/21/23 nondiagnostic, family refused WHIT.   Treated for klebseila aerogenes tracheobronchitis,  LUE DVT, CVA, s/p cranioplasty on 12/7,   Hospital course c/b intracranial bleed, AMS, vomiting and suspected aspiration and septic shock. Patient  intubated 12/21 and ETT cx + enterobacter (I) to imi, BCX + c.glabrata. cta/p showed stable endoleak-no intervention per vascular  ID called for fungemia. PAtient then developed C diff now on po vancomycin    - 12/22 BCX candida glabrata  - susceptibilities reviewed  -  dc caspofungin. completed 2 weeks  - Repeat BCX ngtd  - No evidence of metastatic foci   - completed meropenem course for PNA  - rt fem line-removed, possible source of fungemia  - CSF cultures neg   - c. diff positive with +toxins  - On oral vancomycin since 12/29 - anticipate 2 week course through 1/12/24  - 12/21 TTE noted.   - ophthalmology eval due to fungemia when stable-wife will have him see his opthalmologist  - leukocytosis resolved  - afebrile and hemodynamically stable   - risk for recurrent C diff discussed with wife  - ID f/u outpatient 2-3 weeks to repeat bcx  - overall guarded prognosis    D/w  resident, wife and RN 82y M with PMH HTN, CAD s/p stents, renal cell carcinoma status post left nephrectomy, bladder CA, BPH, CHF, LBBB, vertigo,  HLD, 5.5cm AAA without rupture post EVAR, paroxysmal A-fib on Eliquis, Plavix, and aspirin, early stage Alzheimer dementia transferred from [facility] s/p unwitnessed fall with head strike at home found by wife on floor at 8am. Patient brought to urgent care by wife and had 5 staples to posterior scalp but was instructed to go to nearest ED.  CT head at [place] showed R frontal IPH, SDH, SAH s/p decompressive craniectomy 11/10. Initial TTE with ?  AV vegetation vs calcification vs artifact, repeat TTE 11/21/23 nondiagnostic, family refused WHIT.   Treated for klebseila aerogenes tracheobronchitis,  LUE DVT, CVA, s/p cranioplasty on 12/7,   Hospital course c/b intracranial bleed, AMS, vomiting and suspected aspiration and septic shock. Patient  intubated 12/21 and ETT cx + enterobacter (I) to imi, BCX + c.glabrata. cta/p showed stable endoleak-no intervention per vascular  ID called for fungemia. PAtient then developed C diff now on po vancomycin    - 12/22 BCX candida glabrata  - susceptibilities reviewed  -  dc caspofungin. completed 2 weeks  - Repeat BCX ngtd  - No evidence of metastatic foci   - completed meropenem course for PNA  - rt fem line-removed, possible source of fungemia  - CSF cultures neg   - c. diff positive with +toxins  - On oral vancomycin since 12/29 - anticipate 2 week course through 1/12/24  - 12/21 TTE noted.   - ophthalmology eval due to fungemia when stable-wife will have him see his opthalmologist  - leukocytosis resolved  - afebrile and hemodynamically stable   - risk for recurrent C diff discussed with wife  - ID f/u outpatient 2-3 weeks to repeat bcx  - overall guarded prognosis    D/w  resident, wife and RN

## 2024-01-09 NOTE — PROGRESS NOTE ADULT - SUBJECTIVE AND OBJECTIVE BOX
[facility]  INFECTIOUS DISEASES at [place] [facility] [place]  ===============================================================                  Roman Segura MD               Diplomates American Board of Internal Medicine & Infectious Diseases                * [place] Office - Appt - Tel  [phone] Fax [phone]                * [place] Office - Appt - Tel [phone] Fax [phone]                                  Hospital Consult line:  [phone]  ==============================================================    DEUCE BALL [id number]    Follow up: fungemia, CDI     No acute events overnights  Afebrile and hemodynamically stable   spoke to RN, BM soft not watery    I have personally reviewed the labs and data; pertinent labs and data are listed in this note; please see below.     _______________________________________________________________  REVIEW OF SYSTEMS  limited  ________________________________________________________________  Allergies:  penicillin (Rash)  heparin (Other (Severe))  penicillin (Hives)  Cipro (Other)  Levaquin (Other)  heparin (Other)        ________________________________________________________________  PHYSICAL EXAM  GEN: in NAD, lying in bed.   HEENT: Anicteric sclerae. Craniotomy site c/d/i .   NECK: Supple. Mid-line trachea. No palpable masses or LN  LUNGS: eupneic. CTA B/L.  HEART: RRR, no m/r/g  ABDOMEN: Soft, NT, ND, no HSM.  +BS.    :  No Vallecillo catheter  EXTREMITIES: well perfused, without  edema.  MSK: No joint swelling or deformity   NEUROLOGIC: left hemiplegia   PSYCHIATRIC: calm   LINES: PIV   ________________________________________________________________  Vitals:  Vital Signs Last 24 Hrs  T(C): 36.4 (09 Jan 2024 13:45), Max: 36.7 (09 Jan 2024 00:00)  T(F): 97.5 (09 Jan 2024 13:45), Max: 98 (09 Jan 2024 00:00)  HR: 71 (09 Jan 2024 13:45) (71 - 95)  BP: 122/64 (09 Jan 2024 13:45) (120/68 - 127/68)  BP(mean): --  RR: 18 (09 Jan 2024 13:45) (18 - 18)  SpO2: 98% (09 Jan 2024 13:45) (96% - 98%)    Parameters below as of 09 Jan 2024 13:45  Patient On (Oxygen Delivery Method): room air        Current Antibiotics:  caspofungin IVPB      caspofungin IVPB 50 milliGRAM(s) IV Intermittent every 24 hours  vancomycin    Solution 125 milliGRAM(s) Oral every 6 hours    Other medications:  atorvastatin 80 milliGRAM(s) Oral at bedtime  chlorhexidine 2% Cloths 1 Application(s) Topical <User Schedule>  doxazosin 2 milliGRAM(s) Oral at bedtime  folic acid 1 milliGRAM(s) Oral daily  lacosamide IVPB 150 milliGRAM(s) IV Intermittent every 12 hours  metoprolol tartrate 25 milliGRAM(s) Oral every 6 hours  Nephro-roma 1 Tablet(s) Oral daily  pantoprazole  Injectable 40 milliGRAM(s) IV Push two times a day                            9.2    5.64  )-----------( 180      ( 09 Jan 2024 05:47 )             29.4       01-09    136  |  101  |  18.6  ----------------------------<  93  4.1   |  22.0  |  0.79    Ca    9.6      09 Jan 2024 05:47  Mg     1.5     01-09    TPro  6.7  /  Alb  2.6<L>  /  TBili  0.3<L>  /  DBili  x   /  AST  15  /  ALT  13  /  AlkPhos  94  01-09              Urinalysis Basic - ( 09 Jan 2024 05:47 )    Color: x / Appearance: x / SG: x / pH: x  Gluc: 93 mg/dL / Ketone: x  / Bili: x / Urobili: x   Blood: x / Protein: x / Nitrite: x   Leuk Esterase: x / RBC: x / WBC x   Sq Epi: x / Non Sq Epi: x / Bacteria: x                  CAPILLARY BLOOD GLUCOSE                  RECENT CULTURES:  12-28 @ 05:22 .Blood Blood-Peripheral     No growth at 5 days    12-28 @ 05:22 RVP with SARS-CoV-2   NotDetec      12-25 @ 04:01 .Blood Blood     No growth at 5 days        12-25 @ 03:51 .Blood Blood     No growth at 5 days        12-22 @ 22:41 .Blood Blood-Peripheral Blood Culture PCR  Candida (Torulopsis) glabrata    Growth in aerobic bottle: Candida glabrata  Direct identification is available within approximately 3-5  hours either by Blood Panel Multiplexed PCR or Direct  MALDI-TOF. Details: [email or web address]    Growth in aerobic bottle: Yeast like cells      12-22 @ 22:30 .Blood Blood-Peripheral     Growth in aerobic bottle: Candida glabrata  See previous culture [id number]    Growth in aerobic bottle: Yeast like cells      WBC Count: 10.05 K/uL (01-04-24 @ 08:10)  WBC Count: 8.73 K/uL (01-03-24 @ 13:18)  WBC Count: 8.94 K/uL (01-02-24 @ 08:00)  WBC Count: 11.05 K/uL (01-01-24 @ 04:44)    Creatinine: 0.81 mg/dL (01-04-24 @ 08:10)  Creatinine: 0.78 mg/dL (01-03-24 @ 13:18)  Creatinine: 0.64 mg/dL (01-02-24 @ 08:00)  Creatinine: 0.70 mg/dL (01-01-24 @ 04:44)      SARS-CoV-2: NotDetec (12-28-23 @ 05:22)  SARS-CoV-2: NotDetec (12-20-23 @ 09:55)  SARS-CoV-2: NotDetec (12-10-23 @ 18:20)    ________________________________________________________________  CARDIOLOGY   < from: TTE W or WO Ultrasound Enhancing Agent (12.21.23 @ 19:59) >     CONCLUSIONS:      1. Limited echo to rule out RV strain.   2. Technically difficult image quality.   3. Left ventricular cavity is normal. Left ventricular systolic function is hyperdynamic with an ejection fraction visually estimated at 70 to 75 %.   4. Normal right ventricular cavity size and systolic function.   5. No pericardial effusion seen.    < end of copied text >      ________________________________________________________________  RADIOLOGY  < from: Xray Chest 1 View- PORTABLE-Urgent (Xray Chest 1 View- PORTABLE-Urgent .) (12.28.23 @ 05:50) >  FINDINGS:  Difficult to assess heart size in this projection.  S/P removal of ET and NG tube.  Small left pleural effusion.  Interval development of pulmonary vascular congestive changes.  Bibasilar haziness which may be due to atelectasis or to infection.  No pneumothorax.    IMPRESSION:    New pulmonary vascular congestive changes.  Trace left pleural effusion.  Basilar atelectasis and/or pneumonia.    < end of copied text >      < from: TTE Echo Limited or F/U (11.20.23 @ 20:01) >    ACC: [id number] EXAM:  ECHO TRANSTHORACIC;F U OR LTD                          PROCEDURE DATE:  11/20/2023          INTERPRETATION:  TRANSTHORACIC ECHOCARDIOGRAM REPORT        Patient Name:   DEUCE BALL Patient Location: Inpatient  Medical Rec #:  [id number]                  Accession #:      [id number]  Account #:                                Height:           66.9 in 170.0   cm  YOB: 1940                Weight:           158.7 lb   72.00 kg  Patient Age:    83 years                 BSA:              1.83 m²  Patient Gender: M                         BP:               126/69 mmHg      Date of Exam:        11/20/2023 8:01:16 PM  Sonographer:         Leatha Ye  Referring Physician: Pauline Gonzalez    Procedure:     Follow up or Limited Echocardiogram.  Indications:   R94.31 - Abnormal electrocardiogram ECG/EKG  Diagnosis:     R94.31 - Abnormal electrocardiogram ECG/EKG  Study Details: Technically limited study. Study quality was adversely   affected            due to body habitus.    SPECTRAL DOPPLER ANALYSIS (where applicable):    PHYSICIAN INTERPRETATION:  Left Ventricle: The left ventricle was not well visualized.  Right Ventricle: The right ventricle was not well visualized.  Aortic Valve: This is a nondiagnostic study to evaluate aortic valve   because of poor windows, patient was agitated.  As recommended earlier, Consider WHIT to evaluate Aortic Vavle.      Summary:   1. This is a nondiagnostic study to evaluate aortic valve because of   poor windows, patient was agitated.      As recommended earlier, Consider WHIT to evaluate Aortic Vavle.    MD Keri Electronically signed on 11/21/2023 at 9:12:23 AM      < end of copied text >   [facility]  INFECTIOUS DISEASES at [place] and [place]  ===============================================================                  Roman Segura MD               Diplomates American Board of Internal Medicine & Infectious Diseases                * [place] Office - Appt - Tel  [phone] Fax [phone]                * [place] Office - Appt - Tel [phone] Fax [phone]                                  Hospital Consult line:  [phone]  ==============================================================    DEUCE BALL [id number]    Follow up: fungemia, CDI     No acute events overnights  Afebrile and hemodynamically stable   spoke to RN, BM soft not watery    I have personally reviewed the labs and data; pertinent labs and data are listed in this note; please see below.     _______________________________________________________________  REVIEW OF SYSTEMS  limited  ________________________________________________________________  Allergies:  penicillin (Rash)  heparin (Other (Severe))  penicillin (Hives)  Cipro (Other)  Levaquin (Other)  heparin (Other)        ________________________________________________________________  PHYSICAL EXAM  GEN: in NAD, lying in bed.   HEENT: Anicteric sclerae. Craniotomy site c/d/i .   NECK: Supple. Mid-line trachea. No palpable masses or LN  LUNGS: eupneic. CTA B/L.  HEART: RRR, no m/r/g  ABDOMEN: Soft, NT, ND, no HSM.  +BS.    :  No Vallecillo catheter  EXTREMITIES: well perfused, without  edema.  MSK: No joint swelling or deformity   NEUROLOGIC: left hemiplegia   PSYCHIATRIC: calm   LINES: PIV   ________________________________________________________________  Vitals:  Vital Signs Last 24 Hrs  T(C): 36.4 (09 Jan 2024 13:45), Max: 36.7 (09 Jan 2024 00:00)  T(F): 97.5 (09 Jan 2024 13:45), Max: 98 (09 Jan 2024 00:00)  HR: 71 (09 Jan 2024 13:45) (71 - 95)  BP: 122/64 (09 Jan 2024 13:45) (120/68 - 127/68)  BP(mean): --  RR: 18 (09 Jan 2024 13:45) (18 - 18)  SpO2: 98% (09 Jan 2024 13:45) (96% - 98%)    Parameters below as of 09 Jan 2024 13:45  Patient On (Oxygen Delivery Method): room air        Current Antibiotics:  caspofungin IVPB      caspofungin IVPB 50 milliGRAM(s) IV Intermittent every 24 hours  vancomycin    Solution 125 milliGRAM(s) Oral every 6 hours    Other medications:  atorvastatin 80 milliGRAM(s) Oral at bedtime  chlorhexidine 2% Cloths 1 Application(s) Topical <User Schedule>  doxazosin 2 milliGRAM(s) Oral at bedtime  folic acid 1 milliGRAM(s) Oral daily  lacosamide IVPB 150 milliGRAM(s) IV Intermittent every 12 hours  metoprolol tartrate 25 milliGRAM(s) Oral every 6 hours  Nephro-roma 1 Tablet(s) Oral daily  pantoprazole  Injectable 40 milliGRAM(s) IV Push two times a day                            9.2    5.64  )-----------( 180      ( 09 Jan 2024 05:47 )             29.4       01-09    136  |  101  |  18.6  ----------------------------<  93  4.1   |  22.0  |  0.79    Ca    9.6      09 Jan 2024 05:47  Mg     1.5     01-09    TPro  6.7  /  Alb  2.6<L>  /  TBili  0.3<L>  /  DBili  x   /  AST  15  /  ALT  13  /  AlkPhos  94  01-09              Urinalysis Basic - ( 09 Jan 2024 05:47 )    Color: x / Appearance: x / SG: x / pH: x  Gluc: 93 mg/dL / Ketone: x  / Bili: x / Urobili: x   Blood: x / Protein: x / Nitrite: x   Leuk Esterase: x / RBC: x / WBC x   Sq Epi: x / Non Sq Epi: x / Bacteria: x                  CAPILLARY BLOOD GLUCOSE                  RECENT CULTURES:  12-28 @ 05:22 .Blood Blood-Peripheral     No growth at 5 days    12-28 @ 05:22 RVP with SARS-CoV-2   NotDetec      12-25 @ 04:01 .Blood Blood     No growth at 5 days        12-25 @ 03:51 .Blood Blood     No growth at 5 days        12-22 @ 22:41 .Blood Blood-Peripheral Blood Culture PCR  Candida (Torulopsis) glabrata    Growth in aerobic bottle: Candida glabrata  Direct identification is available within approximately 3-5  hours either by Blood Panel Multiplexed PCR or Direct  MALDI-TOF. Details: [email or web address]    Growth in aerobic bottle: Yeast like cells      12-22 @ 22:30 .Blood Blood-Peripheral     Growth in aerobic bottle: Candida glabrata  See previous culture [id number]    Growth in aerobic bottle: Yeast like cells      WBC Count: 10.05 K/uL (01-04-24 @ 08:10)  WBC Count: 8.73 K/uL (01-03-24 @ 13:18)  WBC Count: 8.94 K/uL (01-02-24 @ 08:00)  WBC Count: 11.05 K/uL (01-01-24 @ 04:44)    Creatinine: 0.81 mg/dL (01-04-24 @ 08:10)  Creatinine: 0.78 mg/dL (01-03-24 @ 13:18)  Creatinine: 0.64 mg/dL (01-02-24 @ 08:00)  Creatinine: 0.70 mg/dL (01-01-24 @ 04:44)      SARS-CoV-2: NotDetec (12-28-23 @ 05:22)  SARS-CoV-2: NotDetec (12-20-23 @ 09:55)  SARS-CoV-2: NotDetec (12-10-23 @ 18:20)    ________________________________________________________________  CARDIOLOGY   < from: TTE W or WO Ultrasound Enhancing Agent (12.21.23 @ 19:59) >     CONCLUSIONS:      1. Limited echo to rule out RV strain.   2. Technically difficult image quality.   3. Left ventricular cavity is normal. Left ventricular systolic function is hyperdynamic with an ejection fraction visually estimated at 70 to 75 %.   4. Normal right ventricular cavity size and systolic function.   5. No pericardial effusion seen.    < end of copied text >      ________________________________________________________________  RADIOLOGY  < from: Xray Chest 1 View- PORTABLE-Urgent (Xray Chest 1 View- PORTABLE-Urgent .) (12.28.23 @ 05:50) >  FINDINGS:  Difficult to assess heart size in this projection.  S/P removal of ET and NG tube.  Small left pleural effusion.  Interval development of pulmonary vascular congestive changes.  Bibasilar haziness which may be due to atelectasis or to infection.  No pneumothorax.    IMPRESSION:    New pulmonary vascular congestive changes.  Trace left pleural effusion.  Basilar atelectasis and/or pneumonia.    < end of copied text >      < from: TTE Echo Limited or F/U (11.20.23 @ 20:01) >    ACC: [id number] EXAM:  ECHO TRANSTHORACIC;F U OR LTD                          PROCEDURE DATE:  11/20/2023          INTERPRETATION:  TRANSTHORACIC ECHOCARDIOGRAM REPORT        Patient Name:   DEUCE BALL Patient Location: Inpatient  Medical Rec #:  [id number]                  Accession #:      [id number]  Account #:                                Height:           66.9 in 170.0   cm  YOB: 1940                Weight:           158.7 lb   72.00 kg  Patient Age:    83 years                 BSA:              1.83 m²  Patient Gender: M                         BP:               126/69 mmHg      Date of Exam:        11/20/2023 8:01:16 PM  Sonographer:         Leatha Ye  Referring Physician: Pauline Gonzalez    Procedure:     Follow up or Limited Echocardiogram.  Indications:   R94.31 - Abnormal electrocardiogram ECG/EKG  Diagnosis:     R94.31 - Abnormal electrocardiogram ECG/EKG  Study Details: Technically limited study. Study quality was adversely   affected            due to body habitus.    SPECTRAL DOPPLER ANALYSIS (where applicable):    PHYSICIAN INTERPRETATION:  Left Ventricle: The left ventricle was not well visualized.  Right Ventricle: The right ventricle was not well visualized.  Aortic Valve: This is a nondiagnostic study to evaluate aortic valve   because of poor windows, patient was agitated.  As recommended earlier, Consider WHIT to evaluate Aortic Vavle.      Summary:   1. This is a nondiagnostic study to evaluate aortic valve because of   poor windows, patient was agitated.      As recommended earlier, Consider WHIT to evaluate Aortic Vavle.    MD Keri Electronically signed on 11/21/2023 at 9:12:23 AM      < end of copied text >

## 2024-01-09 NOTE — PROGRESS NOTE ADULT - ATTENDING COMMENTS
82 y/o M admitted for encephalopathy secondary to ICH, aspiration pneumonia. Found to have C. diff infection. Continue PO Vancomycin. Mental status improved after holding Vimpat. ID f/u regarding duration of Caspo and Vancomycin. 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 Found to have C. diff infection. Continue PO Vancomycin. Mental status improved after holding Vimpat. ID f/u regarding duration of Caspo and Vancomycin.

## 2024-01-09 NOTE — PROGRESS NOTE ADULT - SUBJECTIVE AND OBJECTIVE BOX
Patient is a 83y old  Male who presents with a chief complaint of s/p unwitnessed fall with head strike (08 Jan 2024 12:14)      INTERVAL HPI/OVERNIGHT EVENTS:  No acute events reported overnight. Had 2 BM yesterday. Vimpat was held on family request.     TODAY:  Seen and examined bedside. More awake and alert today. States he would like daughter Jennifer to visit him today.  Denies Chest pain, abdominal pain, shortness of breath, fevers, chills, nausea, vomiting, diarrhea, dysuria, headache, dizziness.    MEDICATIONS  (STANDING):  atorvastatin 80 milliGRAM(s) Oral at bedtime  caspofungin IVPB      caspofungin IVPB 50 milliGRAM(s) IV Intermittent every 24 hours  chlorhexidine 2% Cloths 1 Application(s) Topical <User Schedule>  doxazosin 2 milliGRAM(s) Oral at bedtime  folic acid 1 milliGRAM(s) Oral daily  lacosamide 50 milliGRAM(s) Oral two times a day  metoprolol tartrate 25 milliGRAM(s) Oral every 6 hours  Nephro-roma 1 Tablet(s) Oral daily  pantoprazole  Injectable 40 milliGRAM(s) IV Push two times a day  vancomycin    Solution 125 milliGRAM(s) Oral every 6 hours    MEDICATIONS  (PRN):  acetaminophen     Tablet .. 650 milliGRAM(s) Oral every 6 hours PRN Temp greater or equal to 38C (100.4F), Mild Pain (1 - 3)  artificial tears (preservative free) Ophthalmic Solution 1 Drop(s) Both EYES every 6 hours PRN Dry Eyes      Allergies    penicillin (Rash)  heparin (Other (Severe))  penicillin (Hives)  Cipro (Other)  Levaquin (Other)  heparin (Other)    Intolerances        REVIEW OF SYSTEMS:  Review of systems is otherwise negative unless mentioned in HPI above.    Vital Signs Last 24 Hrs  T(C): 36.4 (09 Jan 2024 05:00), Max: 37 (08 Jan 2024 11:50)  T(F): 97.5 (09 Jan 2024 05:00), Max: 98.6 (08 Jan 2024 11:50)  HR: 88 (09 Jan 2024 08:30) (88 - 102)  BP: 120/68 (09 Jan 2024 05:00) (105/65 - 120/71)  BP(mean): --  RR: 18 (08 Jan 2024 11:50) (18 - 18)  SpO2: 96% (09 Jan 2024 05:00) (96% - 98%)    Parameters below as of 09 Jan 2024 05:00  Patient On (Oxygen Delivery Method): room air        PHYSICAL EXAM:  CONSTITUTIONAL: NAD, A&O x2, awake alert, conversant  ENMT: Healed surgical scar on right side  RESPIRATORY: Normal respiratory effort; lungs are clear to auscultation bilaterally  CARDIOVASCULAR: Regular rate and rhythm, normal S1 and S2, no murmur/rub/gallop.  ABDOMEN: Nontender to palpation, no rebound/guarding; No hepatosplenomegaly  MUSCLOSKELETAL:  No edema  NEUROLOGY: Awake, oriented x 2. Left sided hemiplegia   skin : stage 2 sacral decub       LABS:                        9.2    5.64  )-----------( 180      ( 09 Jan 2024 05:47 )             29.4     01-09    136  |  101  |  18.6  ----------------------------<  93  4.1   |  22.0  |  0.79    Ca    9.6      09 Jan 2024 05:47  Mg     1.5     01-09    TPro  6.7  /  Alb  2.6<L>  /  TBili  0.3<L>  /  DBili  x   /  AST  15  /  ALT  13  /  AlkPhos  94  01-09      Urinalysis Basic - ( 09 Jan 2024 05:47 )    Color: x / Appearance: x / SG: x / pH: x  Gluc: 93 mg/dL / Ketone: x  / Bili: x / Urobili: x   Blood: x / Protein: x / Nitrite: x   Leuk Esterase: x / RBC: x / WBC x   Sq Epi: x / Non Sq Epi: x / Bacteria: x      CAPILLARY BLOOD GLUCOSE          RADIOLOGY & ADDITIONAL TESTS:    Imaging Personally Reviewed:  [ ] YES  [ ] NO    Consultant(s) Notes Reviewed:  [ ] YES  [ ] NO    Care Discussed with Consultants/Other Providers [ ] YES  [ ] NO    Plan of Care discussed with Housestaff [ ]YES [ ] NO

## 2024-01-09 NOTE — PROGRESS NOTE ADULT - TIME BILLING
greater than 50% of time spent reviewing labs, notes, orders, images, medications and coordinating care with medical team
TBI - R frontal IPH, SDH, tSAH; s/p R decompressive hemicraniectomy 11/10.
patient care , labs , meds , chart review

## 2024-01-09 NOTE — PROGRESS NOTE ADULT - ASSESSMENT
83M w/ PMHX of HTN, CAD s/p PCI, renal cell ca s/p L nephrectomy, bladder CA, BPH, CHF, LBBB, vertigo,  HLD, 5.5cm AAA s/p post EVAR, paroxysmal afib on Eliquis, Plavix, ASA, h/o HIT, early stage Alzheimer's dementia, trf from [place] 11/10/23 s/p fall found with multicompartmental ICH, s/p R craniectomy 11/10 and eventual cranioplasty 12/7/23. Hospital course complicated by afib with RVR, Klebsiella pneumonia, LUE DVT, multiple subsequent episodes of ICH after attempting to restart Eliquis, now with acute respiratory failure, aspiration pneumonia, shock, hematemesis.    # Encephalopathy, multifactorial. ICH, CVA, ICU, possible delirium  Waxing, waning  - Needs frequent reorientation  - Neurology signed off  - Mental status improved after holding Anti-epileptics    # ICH s/p craniotomy and cranioplasty  # Abnormal EEG  - Potential epileptogenic focus in the right anterior head region with underlying cerebral dysfunction and evidence for overlying skull defect  - Family concerned about Antiseizure medications making patient lethargic and confused  - Had long conversation with family, they request to stop antiseizure medication.   - Family understands risks that patient may have a seizure which can lead to status epilepticus or even death.  - Family agreeable to decreasing dose of antiseizure medication.  - Vimpat 50mg BID    # Aspiration pneumonia  - Aspiration precautions  - Monitor for hypoxia  - s/p Meropenem  - ID follow up noted     # C diff   - c/w vancomycin 125mg Q 6h   - 2 weeks course    # Dysphagia  - Puree with moderately thick liquids  - SLP evaluation noted    # fungemia:  candida galbrata  - c/w caspofungin  - ID consult appreciated    # Atrial Fibrillation  - Metoprolol 25mg q6h  - No anticoagulation given ICH    # anemia: likely multifactorial  - s/p PRBC  - Protonix 40mg BID  - no intervention per GI    DVT ppx  - SCD   83M w/ PMHX of HTN, CAD s/p PCI, renal cell ca s/p L nephrectomy, bladder CA, BPH, CHF, LBBB, vertigo,  HLD, 5.5cm AAA s/p post EVAR, paroxysmal afib on Eliquis, Plavix, ASA, h/o HIT, early stage Alzheimer's dementia, trf from [place] 11/10/23 s/p fall found with multicompartmental ICH, s/p R craniectomy 11/10 and eventual cranioplasty 12/7/23. Hospital course complicated by afib with RVR, Klebsiella pneumonia, LUE DVT, multiple subsequent episodes of ICH after attempting to restart Eliquis, now with acute respiratory failure, aspiration pneumonia, shock, hematemesis.    # Encephalopathy, multifactorial. ICH, CVA, ICU, possible delirium  Waxing, waning  - Needs frequent reorientation  - Neurology signed off  - Mental status improved after holding Anti-epileptics    # ICH s/p craniotomy and cranioplasty  # Abnormal EEG  - Potential epileptogenic focus in the right anterior head region with underlying cerebral dysfunction and evidence for overlying skull defect  - Family concerned about Antiseizure medications making patient lethargic and confused  - Had long conversation with family, they request to stop antiseizure medication.   - Family understands risks that patient may have a seizure which can lead to status epilepticus or even death.  - Family agreeable to decreasing dose of antiseizure medication.  - Vimpat 50mg BID    # Aspiration pneumonia  - Aspiration precautions  - Monitor for hypoxia  - s/p Meropenem  - ID follow up noted     # C diff   - c/w vancomycin 125mg Q 6h   - 2 weeks course    # Dysphagia  - Puree with moderately thick liquids  - SLP evaluation noted    # fungemia:  candida galbrata  - c/w caspofungin  - ID consult appreciated    # Atrial Fibrillation  - Metoprolol 25mg q6h  - No anticoagulation given ICH    # anemia: likely multifactorial  - s/p PRBC  - Protonix 40mg BID  - no intervention per GI    DVT ppx  - SCD

## 2024-01-09 NOTE — CHART NOTE - NSCHARTNOTEFT_GEN_A_CORE
Source: Patient [ ]  Family [ ]   other [x ]EMR, rounds    Current Diet: Minced and moist with mod thick liquids    Patient reports [ ] nausea  [ ] vomiting [ ] diarrhea [ ] constipation  [ ]chewing problems [ ] swallowing issues  [ ] other:     PO intake:  < 50% [ ]   50-75%  [x ]   %  [ ]  other :    Source for PO intake [ ] Patient [ ] family [ x] chart [ ] staff [ ] other    Enteral /Parenteral Nutrition:     Current Weight: 123# 12/7  no edema    % Weight Change     Pertinent Medications: MEDICATIONS  (STANDING):  atorvastatin 80 milliGRAM(s) Oral at bedtime  caspofungin IVPB 50 milliGRAM(s) IV Intermittent every 24 hours  caspofungin IVPB      chlorhexidine 2% Cloths 1 Application(s) Topical <User Schedule>  doxazosin 2 milliGRAM(s) Oral at bedtime  folic acid 1 milliGRAM(s) Oral daily  lacosamide 50 milliGRAM(s) Oral two times a day  metoprolol tartrate 25 milliGRAM(s) Oral every 6 hours  Nephro-roma 1 Tablet(s) Oral daily  pantoprazole  Injectable 40 milliGRAM(s) IV Push two times a day  vancomycin    Solution 125 milliGRAM(s) Oral every 6 hours    MEDICATIONS  (PRN):  acetaminophen     Tablet .. 650 milliGRAM(s) Oral every 6 hours PRN Temp greater or equal to 38C (100.4F), Mild Pain (1 - 3)  artificial tears (preservative free) Ophthalmic Solution 1 Drop(s) Both EYES every 6 hours PRN Dry Eyes    Pertinent Labs: CBC Full  -  ( 09 Jan 2024 05:47 )  WBC Count : 5.64 K/uL  RBC Count : 3.11 M/uL  Hemoglobin : 9.2 g/dL  Hematocrit : 29.4 %  Platelet Count - Automated : 180 K/uL  Mean Cell Volume : 94.5 fl  Mean Cell Hemoglobin : 29.6 pg  Mean Cell Hemoglobin Concentration : 31.3 gm/dL  Auto Neutrophil # : 3.34 K/uL  Auto Lymphocyte # : 1.65 K/uL  Auto Monocyte # : 0.49 K/uL  Auto Eosinophil # : 0.07 K/uL  # 12/7Auto Basophil # : 0.04 K/uL  Auto Neutrophil % : 59.2 %Auto Lymphocyte % : 29.3 %  Auto Monocyte % : 8.7 %  Auto Eosinophil % : 1.2 %  Auto Basophil % : 0.7 %      01-09 Na136 mmol/L Glu 93 mg/dL K+ 4.1 mmol/L Cr  0.79 mg/dL BUN 18.6 mg/dL Phos n/a   Alb 2.6 g/dL<L> PAB n/a           Skin: stage 2 sacral    Nutrition focused physical exam conducted - found signs of malnutrition [ ]absent [x ]present    Subcutaneous fat loss: [ x] Orbital fat pads region, [x ]Buccal fat region, [ x]Triceps region,  [x ]Ribs region    Muscle wasting: [x ]Temples region, [ x]Clavicle region, [x ]Shoulder region, [ ]Scapula region, [ ]Interosseous region,  [ ]thigh region, [ ]Calf region    Estimated Needs:   [x ] no change since previous assessment  [ ] recalculated:     Current Nutrition Diagnosis: Pt remains at high nutrition risk secondary to severe (acute) protein calorie malnutrition related to inability to meet sufficient protein energy needs in setting of TBI, - R frontal IPH, SDH, tSAH; s/p R decompressive hemicraniectomy 11/10 as evidenced by physical signs of mod muscle/fat loss and meeting < 50% estimated energy needs > 5days, weight loss. Pt remains with suboptimal po intake, however improving.  Aware seen by SLP 1/5, now on minced and moist with moderately thick liquids, +strict aspiration precautions and 1:1 assistance. Last BM 1/9. RD to follow up.     Recommendations:   1) add Ensure Plus High Protein (moderately thick) TID to optimize po intake and provide an additional 350 kcal, 20g protein per serving).   2) Change Nephro-Roma to MVI daily and add vitamin C 500mg daily to aid in wound healing.  3) Encourage po intake, monitor diet tolerance, and provide assistance at meals as needed.  4) Obtain daily weights to monitor trends.    Monitoring and Evaluation:   [x ] PO intake [x ] Tolerance to diet prescription [X] Weights  [X] Follow up per protocol [X] Labs:

## 2024-01-10 LAB
ALBUMIN SERPL ELPH-MCNC: 2.9 G/DL — LOW (ref 3.3–5.2)
ALBUMIN SERPL ELPH-MCNC: 2.9 G/DL — LOW (ref 3.3–5.2)
ALP SERPL-CCNC: 96 U/L — SIGNIFICANT CHANGE UP (ref 40–120)
ALP SERPL-CCNC: 96 U/L — SIGNIFICANT CHANGE UP (ref 40–120)
ALT FLD-CCNC: 12 U/L — SIGNIFICANT CHANGE UP
ALT FLD-CCNC: 12 U/L — SIGNIFICANT CHANGE UP
ANION GAP SERPL CALC-SCNC: 16 MMOL/L — SIGNIFICANT CHANGE UP (ref 5–17)
ANION GAP SERPL CALC-SCNC: 16 MMOL/L — SIGNIFICANT CHANGE UP (ref 5–17)
AST SERPL-CCNC: 14 U/L — SIGNIFICANT CHANGE UP
AST SERPL-CCNC: 14 U/L — SIGNIFICANT CHANGE UP
BILIRUB SERPL-MCNC: 0.3 MG/DL — LOW (ref 0.4–2)
BILIRUB SERPL-MCNC: 0.3 MG/DL — LOW (ref 0.4–2)
BUN SERPL-MCNC: 18.4 MG/DL — SIGNIFICANT CHANGE UP (ref 8–20)
BUN SERPL-MCNC: 18.4 MG/DL — SIGNIFICANT CHANGE UP (ref 8–20)
CALCIUM SERPL-MCNC: 9.6 MG/DL — SIGNIFICANT CHANGE UP (ref 8.4–10.5)
CALCIUM SERPL-MCNC: 9.6 MG/DL — SIGNIFICANT CHANGE UP (ref 8.4–10.5)
CHLORIDE SERPL-SCNC: 100 MMOL/L — SIGNIFICANT CHANGE UP (ref 96–108)
CHLORIDE SERPL-SCNC: 100 MMOL/L — SIGNIFICANT CHANGE UP (ref 96–108)
CO2 SERPL-SCNC: 21 MMOL/L — LOW (ref 22–29)
CO2 SERPL-SCNC: 21 MMOL/L — LOW (ref 22–29)
CREAT SERPL-MCNC: 0.77 MG/DL — SIGNIFICANT CHANGE UP (ref 0.5–1.3)
CREAT SERPL-MCNC: 0.77 MG/DL — SIGNIFICANT CHANGE UP (ref 0.5–1.3)
EGFR: 89 ML/MIN/1.73M2 — SIGNIFICANT CHANGE UP
EGFR: 89 ML/MIN/1.73M2 — SIGNIFICANT CHANGE UP
GLUCOSE SERPL-MCNC: 98 MG/DL — SIGNIFICANT CHANGE UP (ref 70–99)
GLUCOSE SERPL-MCNC: 98 MG/DL — SIGNIFICANT CHANGE UP (ref 70–99)
HCT VFR BLD CALC: 29.2 % — LOW (ref 39–50)
HCT VFR BLD CALC: 29.2 % — LOW (ref 39–50)
HGB BLD-MCNC: 9.7 G/DL — LOW (ref 13–17)
HGB BLD-MCNC: 9.7 G/DL — LOW (ref 13–17)
MAGNESIUM SERPL-MCNC: 1.5 MG/DL — LOW (ref 1.6–2.6)
MAGNESIUM SERPL-MCNC: 1.5 MG/DL — LOW (ref 1.6–2.6)
MCHC RBC-ENTMCNC: 30.8 PG — SIGNIFICANT CHANGE UP (ref 27–34)
MCHC RBC-ENTMCNC: 30.8 PG — SIGNIFICANT CHANGE UP (ref 27–34)
MCHC RBC-ENTMCNC: 33.2 GM/DL — SIGNIFICANT CHANGE UP (ref 32–36)
MCHC RBC-ENTMCNC: 33.2 GM/DL — SIGNIFICANT CHANGE UP (ref 32–36)
MCV RBC AUTO: 92.7 FL — SIGNIFICANT CHANGE UP (ref 80–100)
MCV RBC AUTO: 92.7 FL — SIGNIFICANT CHANGE UP (ref 80–100)
PLATELET # BLD AUTO: 197 K/UL — SIGNIFICANT CHANGE UP (ref 150–400)
PLATELET # BLD AUTO: 197 K/UL — SIGNIFICANT CHANGE UP (ref 150–400)
POTASSIUM SERPL-MCNC: 3.6 MMOL/L — SIGNIFICANT CHANGE UP (ref 3.5–5.3)
POTASSIUM SERPL-MCNC: 3.6 MMOL/L — SIGNIFICANT CHANGE UP (ref 3.5–5.3)
POTASSIUM SERPL-SCNC: 3.6 MMOL/L — SIGNIFICANT CHANGE UP (ref 3.5–5.3)
POTASSIUM SERPL-SCNC: 3.6 MMOL/L — SIGNIFICANT CHANGE UP (ref 3.5–5.3)
PROT SERPL-MCNC: 6.4 G/DL — LOW (ref 6.6–8.7)
PROT SERPL-MCNC: 6.4 G/DL — LOW (ref 6.6–8.7)
RBC # BLD: 3.15 M/UL — LOW (ref 4.2–5.8)
RBC # BLD: 3.15 M/UL — LOW (ref 4.2–5.8)
RBC # FLD: 14.5 % — SIGNIFICANT CHANGE UP (ref 10.3–14.5)
RBC # FLD: 14.5 % — SIGNIFICANT CHANGE UP (ref 10.3–14.5)
SODIUM SERPL-SCNC: 137 MMOL/L — SIGNIFICANT CHANGE UP (ref 135–145)
SODIUM SERPL-SCNC: 137 MMOL/L — SIGNIFICANT CHANGE UP (ref 135–145)
WBC # BLD: 5.92 K/UL — SIGNIFICANT CHANGE UP (ref 3.8–10.5)
WBC # BLD: 5.92 K/UL — SIGNIFICANT CHANGE UP (ref 3.8–10.5)
WBC # FLD AUTO: 5.92 K/UL — SIGNIFICANT CHANGE UP (ref 3.8–10.5)
WBC # FLD AUTO: 5.92 K/UL — SIGNIFICANT CHANGE UP (ref 3.8–10.5)

## 2024-01-10 PROCEDURE — 99232 SBSQ HOSP IP/OBS MODERATE 35: CPT

## 2024-01-10 PROCEDURE — 99233 SBSQ HOSP IP/OBS HIGH 50: CPT

## 2024-01-10 RX ORDER — MAGNESIUM SULFATE 500 MG/ML
2 VIAL (ML) INJECTION ONCE
Refills: 0 | Status: COMPLETED | OUTPATIENT
Start: 2024-01-10 | End: 2024-01-10

## 2024-01-10 RX ADMIN — Medication 25 MILLIGRAM(S): at 06:02

## 2024-01-10 RX ADMIN — Medication 125 MILLIGRAM(S): at 06:02

## 2024-01-10 RX ADMIN — Medication 125 MILLIGRAM(S): at 01:08

## 2024-01-10 RX ADMIN — Medication 125 MILLIGRAM(S): at 18:04

## 2024-01-10 RX ADMIN — Medication 25 GRAM(S): at 13:05

## 2024-01-10 RX ADMIN — PANTOPRAZOLE SODIUM 40 MILLIGRAM(S): 20 TABLET, DELAYED RELEASE ORAL at 18:03

## 2024-01-10 RX ADMIN — Medication 25 MILLIGRAM(S): at 13:05

## 2024-01-10 RX ADMIN — Medication 125 MILLIGRAM(S): at 13:05

## 2024-01-10 RX ADMIN — Medication 25 MILLIGRAM(S): at 01:09

## 2024-01-10 RX ADMIN — Medication 1 TABLET(S): at 13:05

## 2024-01-10 RX ADMIN — CHLORHEXIDINE GLUCONATE 1 APPLICATION(S): 213 SOLUTION TOPICAL at 06:09

## 2024-01-10 RX ADMIN — Medication 25 MILLIGRAM(S): at 18:03

## 2024-01-10 RX ADMIN — Medication 1 MILLIGRAM(S): at 13:04

## 2024-01-10 RX ADMIN — PANTOPRAZOLE SODIUM 40 MILLIGRAM(S): 20 TABLET, DELAYED RELEASE ORAL at 06:02

## 2024-01-10 NOTE — PROGRESS NOTE ADULT - SUBJECTIVE AND OBJECTIVE BOX
HPI: 82y M with PMH HTN, CAD s/p stents, renal cell carcinoma status post left nephrectomy, bladder CA, BPH, CHF, LBBB, vertigo,  HLD, 5.5cm AAA without rupture post EVAR, paroxysmal A-fib on Eliquis, Plavix, and aspirin, early stage Alzheimer dementia transferred from [facility] s/p unwitnessed fall with head strike at home found by wife on floor at 8am. Patient brought to urgent care by wife and had 5 staples to posterior scalp but was instructed to go to nearest ED.  CT head at [place] showed R frontal IPH, SDH, SAH at 9:00. CTA stroke protocol not performed at [facility]. Patient BIB on 6L NC.  Pt GCS 15 on arrival, A&Ox2 on arrival. After initial assessment, patient noted to be vomiting enroute to CT scanner.     Interval/Overnight    Pt seen today. Pt was sitting upright with his head somewhat flexed forward. When asking the patient questions his voice is hypophonic and he states he is in senior care.  It is difficult to understand all what is said due to the voice being hypophonic. Pt states he is ok.      Vital Signs Last 24 Hrs  T(C): 36.4 (10 Ashok 2024 09:45), Max: 36.6 (10 Ashok 2024 00:18)  T(F): 97.6 (10 Ashok 2024 09:45), Max: 97.8 (10 Ashok 2024 00:18)  HR: 92 (10 Ashok 2024 13:00) (73 - 92)  BP: 124/66 (10 Ashok 2024 13:00) (120/76 - 167/77)  BP(mean): --  RR: 18 (10 Ashok 2024 09:45) (17 - 18)  SpO2: 93% (10 Ashok 2024 09:45) (93% - 98%)    Parameters below as of 10 Ashok 2024 09:45  Patient On (Oxygen Delivery Method): room air    PHYSICAL EXAM:  GENERAL: NAD, well-groomed  HEAD:  Atraumatic, normocephalic  WOUND: Site clean dry intact  MENTAL STATUS: AAO x2; Awake; Opens eyes spontaneously; hypophonic; following simple commands  CRANIAL NERVES: PERRL. EOMI without nystagmus. Face symmetric w/ normal eye closure and smile, tongue midline. Hearing grossly intact. Speech hypophonic. Head turning and shoulder shrug intact.   MOTOR: RUE spontaneous; RLE wiggles toes; LUE and LLE no movement  SENSATION: grossly intact to light touch all extremities  EXTREMITIES:  2+ peripheral pulses, no clubbing, cyanosis, or edema  SKIN: Warm, dry; no rashes or lesions    LABS:                        9.7    5.92  )-----------( 197      ( 10 Ashok 2024 06:12 )             29.2     01-10    137  |  100  |  18.4  ----------------------------<  98  3.6   |  21.0<L>  |  0.77    Ca    9.6      10 Ashok 2024 06:12  Mg     1.5     01-10    TPro  6.4<L>  /  Alb  2.9<L>  /  TBili  0.3<L>  /  DBili  x   /  AST  14  /  ALT  12  /  AlkPhos  96  01-10      Urinalysis Basic - ( 10 Ashok 2024 06:12 )    Color: x / Appearance: x / SG: x / pH: x  Gluc: 98 mg/dL / Ketone: x  / Bili: x / Urobili: x   Blood: x / Protein: x / Nitrite: x   Leuk Esterase: x / RBC: x / WBC x   Sq Epi: x / Non Sq Epi: x / Bacteria: x        01-09 @ 07:01  -  01-10 @ 07:00  --------------------------------------------------------  IN: 0 mL / OUT: 1300 mL / NET: -1300 mL        RADIOLOGY & ADDITIONAL TESTS:  < from: CT Head No Cont (12.22.23 @ 03:51) >  IMPRESSION:  Interval decrease in size of the simple density fluid collection, likely   either a pseudomeningocele or seroma, superficial to the right-sided   cranioplasty.  No other significant change.    < end of copied text >   HPI: 82y M with PMH HTN, CAD s/p stents, renal cell carcinoma status post left nephrectomy, bladder CA, BPH, CHF, LBBB, vertigo,  HLD, 5.5cm AAA without rupture post EVAR, paroxysmal A-fib on Eliquis, Plavix, and aspirin, early stage Alzheimer dementia transferred from [facility] s/p unwitnessed fall with head strike at home found by wife on floor at 8am. Patient brought to urgent care by wife and had 5 staples to posterior scalp but was instructed to go to nearest ED.  CT head at [place] showed R frontal IPH, SDH, SAH at 9:00. CTA stroke protocol not performed at [facility]. Patient BIB on 6L NC.  Pt GCS 15 on arrival, A&Ox2 on arrival. After initial assessment, patient noted to be vomiting enroute to CT scanner.     Interval/Overnight    Pt seen today. Pt was sitting upright with his head somewhat flexed forward. When asking the patient questions his voice is hypophonic and he states he is in skilled nursing.  It is difficult to understand all what is said due to the voice being hypophonic. Pt states he is ok.      Vital Signs Last 24 Hrs  T(C): 36.4 (10 Ashok 2024 09:45), Max: 36.6 (10 Ashok 2024 00:18)  T(F): 97.6 (10 Ashok 2024 09:45), Max: 97.8 (10 Ashok 2024 00:18)  HR: 92 (10 Ashok 2024 13:00) (73 - 92)  BP: 124/66 (10 Ashok 2024 13:00) (120/76 - 167/77)  BP(mean): --  RR: 18 (10 Ashok 2024 09:45) (17 - 18)  SpO2: 93% (10 Ashok 2024 09:45) (93% - 98%)    Parameters below as of 10 Ashok 2024 09:45  Patient On (Oxygen Delivery Method): room air    PHYSICAL EXAM:  GENERAL: NAD, well-groomed  HEAD:  Atraumatic, normocephalic  WOUND: Site clean dry intact  MENTAL STATUS: AAO x2; Awake; Opens eyes spontaneously; hypophonic; following simple commands  CRANIAL NERVES: PERRL. EOMI without nystagmus. Face symmetric w/ normal eye closure and smile, tongue midline. Hearing grossly intact. Speech hypophonic. Head turning and shoulder shrug intact.   MOTOR: RUE spontaneous; RLE wiggles toes; LUE and LLE no movement  SENSATION: grossly intact to light touch all extremities  EXTREMITIES:  2+ peripheral pulses, no clubbing, cyanosis, or edema  SKIN: Warm, dry; no rashes or lesions    LABS:                        9.7    5.92  )-----------( 197      ( 10 Ashok 2024 06:12 )             29.2     01-10    137  |  100  |  18.4  ----------------------------<  98  3.6   |  21.0<L>  |  0.77    Ca    9.6      10 Ashok 2024 06:12  Mg     1.5     01-10    TPro  6.4<L>  /  Alb  2.9<L>  /  TBili  0.3<L>  /  DBili  x   /  AST  14  /  ALT  12  /  AlkPhos  96  01-10      Urinalysis Basic - ( 10 Ashok 2024 06:12 )    Color: x / Appearance: x / SG: x / pH: x  Gluc: 98 mg/dL / Ketone: x  / Bili: x / Urobili: x   Blood: x / Protein: x / Nitrite: x   Leuk Esterase: x / RBC: x / WBC x   Sq Epi: x / Non Sq Epi: x / Bacteria: x        01-09 @ 07:01  -  01-10 @ 07:00  --------------------------------------------------------  IN: 0 mL / OUT: 1300 mL / NET: -1300 mL        RADIOLOGY & ADDITIONAL TESTS:  < from: CT Head No Cont (12.22.23 @ 03:51) >  IMPRESSION:  Interval decrease in size of the simple density fluid collection, likely   either a pseudomeningocele or seroma, superficial to the right-sided   cranioplasty.  No other significant change.    < end of copied text >

## 2024-01-10 NOTE — PROGRESS NOTE ADULT - ASSESSMENT
83M w/ PMHX of HTN, CAD s/p PCI, renal cell ca s/p L nephrectomy, bladder CA, BPH, CHF, LBBB, vertigo,  HLD, 5.5cm AAA s/p post EVAR, paroxysmal afib on Eliquis, Plavix, ASA, h/o HIT, early stage Alzheimer's dementia, trf from [place] 11/10/23 s/p fall found with multicompartmental ICH, s/p R craniectomy 11/10 and eventual cranioplasty 12/7/23. Hospital course complicated by afib with RVR, Klebsiella pneumonia, LUE DVT, multiple subsequent episodes of ICH after attempting to restart Eliquis, now with acute respiratory failure, aspiration pneumonia, shock, hematemesis.    Plan:  - Q4 neuro checks  - AED per neurology  - Pain control PRN; avoid oversedation to trend neuro exam  - Normotension  - Record BMs  - Wound: permitted to shower gently near incision, pat dry, leave open to air.  - Continue holding Eliquis- unsure when and if patient would be a safe candidate to resume with multiple episodes of new ICH after attempting to resume. Risk benefit alternative analysis per primary team  - b/l SCDs  - Supportive care/medical management per primary team  - D/w Dr. Larios   83M w/ PMHX of HTN, CAD s/p PCI, renal cell ca s/p L nephrectomy, bladder CA, BPH, CHF, LBBB, vertigo,  HLD, 5.5cm AAA s/p post EVAR, paroxysmal afib on Eliquis, Plavix, ASA, h/o HIT, early stage Alzheimer's dementia, trf from [place] 11/10/23 s/p fall found with multicompartmental ICH, s/p R craniectomy 11/10 and eventual cranioplasty 12/7/23. Hospital course complicated by afib with RVR, Klebsiella pneumonia, LUE DVT, multiple subsequent episodes of ICH after attempting to restart Eliquis, now with acute respiratory failure, aspiration pneumonia, shock, hematemesis.    Plan:  - Q4 neuro checks  - AED per neurology  - Pain control PRN; avoid oversedation to trend neuro exam  - Normotension  - Record BMs  - Wound: permitted to shower gently near incision, pat dry, leave open to air.  - Continue holding Eliquis- unsure when and if patient would be a safe candidate to resume with multiple episodes of new ICH after attempting to resume. Risk benefit alternative analysis per primary team  - b/l SCDs  - Supportive care/medical management per primary team  - D/w Dr. Larios

## 2024-01-10 NOTE — PROGRESS NOTE ADULT - SUBJECTIVE AND OBJECTIVE BOX
Patient is a 83y old  Male who presents with a chief complaint of s/p unwitnessed fall with head strike (09 Jan 2024 14:58)      INTERVAL HPI/OVERNIGHT EVENTS:  No acute events reported overnight. Had 1 BM.    TODAY:  Seen and examined bedside. Has been awake and alert since Vimpat held. Patient was seen by PM&R  Denies Chest pain, abdominal pain, shortness of breath, fevers, chills    MEDICATIONS  (STANDING):  atorvastatin 80 milliGRAM(s) Oral at bedtime  chlorhexidine 2% Cloths 1 Application(s) Topical <User Schedule>  doxazosin 2 milliGRAM(s) Oral at bedtime  folic acid 1 milliGRAM(s) Oral daily  lacosamide 50 milliGRAM(s) Oral two times a day  magnesium sulfate  IVPB 2 Gram(s) IV Intermittent once  metoprolol tartrate 25 milliGRAM(s) Oral every 6 hours  Nephro-roma 1 Tablet(s) Oral daily  pantoprazole  Injectable 40 milliGRAM(s) IV Push two times a day  vancomycin    Solution 125 milliGRAM(s) Oral every 6 hours    MEDICATIONS  (PRN):  acetaminophen     Tablet .. 650 milliGRAM(s) Oral every 6 hours PRN Temp greater or equal to 38C (100.4F), Mild Pain (1 - 3)  artificial tears (preservative free) Ophthalmic Solution 1 Drop(s) Both EYES every 6 hours PRN Dry Eyes      Allergies    penicillin (Rash)  heparin (Other (Severe))  penicillin (Hives)  Cipro (Other)  Levaquin (Other)  heparin (Other)    Intolerances        REVIEW OF SYSTEMS:      Vital Signs Last 24 Hrs  T(C): 36.4 (10 Ashok 2024 09:45), Max: 36.6 (10 Ashok 2024 00:18)  T(F): 97.6 (10 Ashok 2024 09:45), Max: 97.8 (10 Ashok 2024 00:18)  HR: 89 (10 Ashok 2024 09:45) (71 - 89)  BP: 120/76 (10 Ashok 2024 09:45) (120/76 - 167/77)  BP(mean): --  RR: 18 (10 Ashok 2024 09:45) (17 - 18)  SpO2: 93% (10 Ashok 2024 09:45) (93% - 98%)    Parameters below as of 10 Ashok 2024 09:45  Patient On (Oxygen Delivery Method): room air        PHYSICAL EXAM:  CONSTITUTIONAL: NAD, A&O x2, awake alert, conversant  ENMT: Healed surgical scar on right side  RESPIRATORY: Normal respiratory effort; lungs are clear to auscultation bilaterally  CARDIOVASCULAR: Regular rate and rhythm, normal S1 and S2, no murmur/rub/gallop.  ABDOMEN: Nontender to palpation, no rebound/guarding; No hepatosplenomegaly  MUSCLOSKELETAL:  No edema  NEUROLOGY: Awake, oriented x 2. Left sided hemiplegia   SKIN : stage 2 sacral decub       LABS:                        9.7    5.92  )-----------( 197      ( 10 Ashok 2024 06:12 )             29.2     01-10    137  |  100  |  18.4  ----------------------------<  98  3.6   |  21.0<L>  |  0.77    Ca    9.6      10 Ashok 2024 06:12  Mg     1.5     01-10    TPro  6.4<L>  /  Alb  2.9<L>  /  TBili  0.3<L>  /  DBili  x   /  AST  14  /  ALT  12  /  AlkPhos  96  01-10      Urinalysis Basic - ( 10 Ashok 2024 06:12 )    Color: x / Appearance: x / SG: x / pH: x  Gluc: 98 mg/dL / Ketone: x  / Bili: x / Urobili: x   Blood: x / Protein: x / Nitrite: x   Leuk Esterase: x / RBC: x / WBC x   Sq Epi: x / Non Sq Epi: x / Bacteria: x      CAPILLARY BLOOD GLUCOSE          RADIOLOGY & ADDITIONAL TESTS:    Imaging Personally Reviewed:  [ ] YES  [ ] NO    Consultant(s) Notes Reviewed:  [ ] YES  [ ] NO    Care Discussed with Consultants/Other Providers [ ] YES  [ ] NO    Plan of Care discussed with Housestaff [ ]YES [ ] NO

## 2024-01-10 NOTE — PROGRESS NOTE ADULT - ATTENDING COMMENTS
82 y/o M admitted for encephalopathy secondary to ICH, aspiration pneumonia. Found to have C. diff infection. Continue PO Vancomycin until 1/12.

## 2024-01-10 NOTE — PROGRESS NOTE ADULT - ASSESSMENT
83M w/ PMHX of HTN, CAD s/p PCI, renal cell ca s/p L nephrectomy, bladder CA, BPH, CHF, LBBB, vertigo,  HLD, 5.5cm AAA s/p post EVAR, paroxysmal afib on Eliquis, Plavix, ASA, h/o HIT, early stage Alzheimer's dementia, trf from [place] 11/10/23 s/p fall found with multicompartmental ICH, s/p R craniectomy 11/10 and eventual cranioplasty 12/7/23. Hospital course complicated by afib with RVR, Klebsiella pneumonia, LUE DVT, multiple subsequent episodes of ICH after attempting to restart Eliquis, now with acute respiratory failure, aspiration pneumonia, shock, hematemesis.    # Encephalopathy, multifactorial. ICH, CVA, ICU, possible delirium  Waxing, waning  - Needs frequent reorientation  - Neurology signed off  - Mental status improved after holding Anti-epileptics    # ICH s/p craniotomy and cranioplasty  # Abnormal EEG  - Potential epileptogenic focus in the right anterior head region with underlying cerebral dysfunction and evidence for overlying skull defect  - Family concerned about Antiseizure medications making patient lethargic and confused  - Had long conversation with family, they request to stop antiseizure medication.   - Family understands risks that patient may have a seizure which can lead to status epilepticus or even death.  - Family agreeable to decreasing dose of antiseizure medication.  - Vimpat 50mg BID held    # Aspiration pneumonia  - Aspiration precautions  - Monitor for hypoxia  - s/p Meropenem  - ID follow up noted     # C diff   - c/w vancomycin 125mg Q 6h   - 2 weeks course to be completed on 1/12/2024    # Dysphagia  - Puree with moderately thick liquids  - SLP evaluation noted    # fungemia:  candida galbrata  - Completed Caspofungin course  - ID consult appreciated    # Atrial Fibrillation  - Metoprolol 25mg q6h  - No anticoagulation given ICH    # anemia: likely multifactorial  - s/p PRBC  - Protonix 40mg BID  - no intervention per GI    DVT ppx  - SCD    DISPO: [facility] as per PT. However family preferring Acute rehab. Patient seen by PM&R, not a candidate for Acute rehab   83M w/ PMHX of HTN, CAD s/p PCI, renal cell ca s/p L nephrectomy, bladder CA, BPH, CHF, LBBB, vertigo,  HLD, 5.5cm AAA s/p post EVAR, paroxysmal afib on Eliquis, Plavix, ASA, h/o HIT, early stage Alzheimer's dementia, trf from [place] 11/10/23 s/p fall found with multicompartmental ICH, s/p R craniectomy 11/10 and eventual cranioplasty 12/7/23. Hospital course complicated by afib with RVR, Klebsiella pneumonia, LUE DVT, multiple subsequent episodes of ICH after attempting to restart Eliquis, now with acute respiratory failure, aspiration pneumonia, shock, hematemesis.    # Encephalopathy, multifactorial. ICH, CVA, ICU, possible delirium  Waxing, waning  - Needs frequent reorientation  - Neurology signed off  - Mental status improved after holding Anti-epileptics    # ICH s/p craniotomy and cranioplasty  # Abnormal EEG  - Potential epileptogenic focus in the right anterior head region with underlying cerebral dysfunction and evidence for overlying skull defect  - Family concerned about Antiseizure medications making patient lethargic and confused  - Had long conversation with family, they request to stop antiseizure medication.   - Family understands risks that patient may have a seizure which can lead to status epilepticus or even death.  - Family agreeable to decreasing dose of antiseizure medication.  - Vimpat 50mg BID held    # Aspiration pneumonia  - Aspiration precautions  - Monitor for hypoxia  - s/p Meropenem  - ID follow up noted     # C diff   - c/w vancomycin 125mg Q 6h   - 2 weeks course to be completed on 1/12/2024    # Dysphagia  - Puree with moderately thick liquids  - SLP evaluation noted    # fungemia:  candida galbrata  - Completed Caspofungin course  - ID consult appreciated    # Atrial Fibrillation  - Metoprolol 25mg q6h  - No anticoagulation given ICH    # anemia: likely multifactorial  - s/p PRBC  - Protonix 40mg BID  - no intervention per GI    DVT ppx  - SCD    DISPO: [facility] as per PT. However family preferring Acute rehab. Patient seen by PM&R, not a candidate for Acute rehab

## 2024-01-10 NOTE — PROGRESS NOTE ADULT - SUBJECTIVE AND OBJECTIVE BOX
Reconsulted to assess for AR.  Patient with limited participation.  Needs to be redirected for exam as he is perseverative on his name, making jokes.  Asked to lift arm or leg and unable to process specific arm/leg.   Significantly limited in 3 extremities.     FUNCTIONAL PROGRESS  1/8 PT  Bed Mobility  Bed Mobility Training Sit-to-Supine: maximum assist (25% patient effort);  1 person assist;  verbal cues  Bed Mobility Training Supine-to-Sit: maximum assist (25% patient effort);  verbal cues;  1 person assist  Bed Mobility Training Limitations: decreased ability to use arms for pushing/pulling;  decreased ability to use legs for bridging/pushing;  impaired ability to control trunk for mobility;  decreased ROM;  decreased flexibility;  decreased strength;  impaired balance;  impaired coordination;  impaired motor control;  impaired postural control;  cognitive, decreased safety awareness    Bed-Chair Transfer Training  Bed-to-Chair Transfer Training Treatment Not Performed: recommend mechanical lift at this time     Sit-Stand Transfer Training  Sit-to-Stand Transfer Training Treatment not Performed: unsafe     1/2 PT  Bed Mobility  Bed Mobility Training Rehab Potential: good, to achieve stated therapy goals  Bed Mobility Training Sit-to-Supine: maximum assist (25% patient effort);  1 person assist  Bed Mobility Training Supine-to-Sit: maximum assist (25% patient effort);  1 person assist  Bed Mobility Training Limitations: decreased ability to use arms for pushing/pulling;  decreased ability to use legs for bridging/pushing;  impaired ability to control trunk for mobility;  decreased strength;  impaired balance;  cognitive, decreased safety awareness    12/20 PT  Bed Mobility: Supine to Sit:     · Level of [place]	maximum assist (25% patients effort)  · Physical Assist/Nonphysical Assist	1 person assist; nonverbal cues (demo/gestures); verbal cues  · Assistive Device	bed rails    Bed Mobility Analysis:     · Bed Mobility Limitations	decreased ability to use legs for bridging/pushing  · Impairments Contributing to Impaired Bed Mobility	decreased strength; impaired balance; cognition    Transfer: Sit to Stand:     · Level of [place]	unable to perform; Pt lethargic, keeping eyes closed during assessment unable to attempt at this time    Transfer: Stand to Sit:     · Level of [place]	unable to perform    Gait Skills:     · Level of [place]	unable to perform      VITALS  T(C): 36.6 (01-10-24 @ 05:00), Max: 36.6 (01-10-24 @ 00:18)  HR: 82 (01-10-24 @ 05:00) (71 - 92)  BP: 167/77 (01-10-24 @ 05:00) (122/64 - 167/77)  RR: 18 (01-10-24 @ 00:18) (17 - 18)  SpO2: 93% (01-10-24 @ 05:00) (93% - 98%)  Wt(kg): --    MEDICATIONS   acetaminophen     Tablet .. 650 milliGRAM(s) every 6 hours PRN  artificial tears (preservative free) Ophthalmic Solution 1 Drop(s) every 6 hours PRN  atorvastatin 80 milliGRAM(s) at bedtime  chlorhexidine 2% Cloths 1 Application(s) <User Schedule>  doxazosin 2 milliGRAM(s) at bedtime  folic acid 1 milliGRAM(s) daily  lacosamide 50 milliGRAM(s) two times a day  magnesium sulfate  IVPB 2 Gram(s) once  metoprolol tartrate 25 milliGRAM(s) every 6 hours  Nephro-roma 1 Tablet(s) daily  pantoprazole  Injectable 40 milliGRAM(s) two times a day  vancomycin    Solution 125 milliGRAM(s) every 6 hours      RECENT LABS/IMAGING  - Reviewed Today                        9.7    5.92  )-----------( 197      ( 10 Ashok 2024 06:12 )             29.2     01-10    137  |  100  |  18.4  ----------------------------<  98  3.6   |  21.0<L>  |  0.77    Ca    9.6      10 Ashok 2024 06:12  Mg     1.5     01-10    TPro  6.4<L>  /  Alb  2.9<L>  /  TBili  0.3<L>  /  DBili  x   /  AST  14  /  ALT  12  /  AlkPhos  96  01-10      Urinalysis Basic - ( 10 Ashok 2024 06:12 )    Color: x / Appearance: x / SG: x / pH: x  Gluc: 98 mg/dL / Ketone: x  / Bili: x / Urobili: x   Blood: x / Protein: x / Nitrite: x   Leuk Esterase: x / RBC: x / WBC x   Sq Epi: x / Non Sq Epi: x / Bacteria: x            HEAD CT 11/16 - Improved to stable multicompartment intracranial hemorrhages. Right-sided subdural hygroma, larger in size.    US V DOPPLER BLE 11/16 - No evidence of deep venous thrombosis in either lower extremity.    HEAD CT 11/22 - Mixed attenuating subdural hematoma again seen with some expected postoperative changes. Evolving area of parenchymal hemorrhage involving the right frontal region.    HEAD CT 11/23 - Stable multicompartment intracranial hemorrhages. Stable right-sided subdural collection.    HEAD CT 11/29 - Stable intracranial hemorrhages.    HEAD CT 12/2  Right hemicraniectomy. Right subdural fluid attenuation collection as seen on the prior. Decreased conspicuity to regions of acute hemorrhage in the right high frontal parasagittal region as well as stable appearance to hemorrhage in the right insular posterior frontal region in association with lucency as seen on the prior study    HEAD CT 12/5 - Unchanged evolving hemorrhage within the RIGHT frontal lobe, medially and laterally with associated edema. RIGHT frontoparietal hemicraniectomy is again noted with unchanged extra-axial. Mild to moderate periventricular white matter ischemia noted.    HEAD CT 12/11 - No change since 12/9/2023.    BLE V DOPPLER 12/12 - No evidence of deep venous thrombosis in either lower extremity.  ----------------------------------------------------------------------------------------  PHYSICAL EXAM  Constitutional - NAD, Comfortable   HEENT - Right craniotomy   Extremities - No peripheral edema   Neurologic Exam -                    Cognitive - AAOx self     Communication - Delayed processing, Expressive Deficits, Perseverative     FUNCTIONAL MOTOR EXAM -                      LEFT    UE - ShAB 0/5, EF 0/5, EE 1/5,  0/5                    RIGHT UE - ShAB 3/5, EF 3/5, EE 3/5,  3/5                    LEFT    LE - HF 1/5, KE 1/5, DF 2/5                     RIGHT LE - HF 0/5, KE 0/5, DF 0/5, PF 0/5     Psych - Perseverative  ----------------------------------------------------------------------------------------  ASSESSMENT/PLAN  83yMale with functional deficits after a fall sustaining multicompartment intracranial bleeding  CDIFF - Vanco  Seizures - Vimpat  CAD - Lipitor   Oropharyngeal Dysphagia - Puree & MOD THICK Liquids  Pain - Tylenol  DVT PPX - SCD   Rehab/Impaired mobility and function - Patient continues to require hospitalization for the above diagnoses and ongoing active management of comorbid complications (functional deficits) that are substantially impairing functional ability and impairing quality of life necessitating ongoing medical management of these complications.     Patient with limited neurological recovery in his motor exam as well as dysphagia. Patient is expected to need long term physical assist. Continue to recommend HOLLY, patient DOES NOT meet acute inpatient rehabilitation criteria. Patient needs a more prolonged stay to achieve transition to community living and would not be able to tolerate a comprehensive/intense rehab program of 3hours/day.     Discussed with SW/CM.    Will sign off at this time. Thank you for allowing me to be part of your patient's care. Please reconsult PMR for additional rehab recommendations or dispo needs if functional status changes. Discussed the specific management and recommendations above with rehab clinical care team/rehab liaison.     Reconsulted to assess for AR.  Patient with limited participation.  Needs to be redirected for exam as he is perseverative on his name, making jokes.  Asked to lift arm or leg and unable to process specific arm/leg.   Significantly limited in 3 extremities.     FUNCTIONAL PROGRESS  1/8 PT  Bed Mobility  Bed Mobility Training Sit-to-Supine: maximum assist (25% patient effort);  1 person assist;  verbal cues  Bed Mobility Training Supine-to-Sit: maximum assist (25% patient effort);  verbal cues;  1 person assist  Bed Mobility Training Limitations: decreased ability to use arms for pushing/pulling;  decreased ability to use legs for bridging/pushing;  impaired ability to control trunk for mobility;  decreased ROM;  decreased flexibility;  decreased strength;  impaired balance;  impaired coordination;  impaired motor control;  impaired postural control;  cognitive, decreased safety awareness    Bed-Chair Transfer Training  Bed-to-Chair Transfer Training Treatment Not Performed: recommend mechanical lift at this time     Sit-Stand Transfer Training  Sit-to-Stand Transfer Training Treatment not Performed: unsafe     1/2 PT  Bed Mobility  Bed Mobility Training Rehab Potential: good, to achieve stated therapy goals  Bed Mobility Training Sit-to-Supine: maximum assist (25% patient effort);  1 person assist  Bed Mobility Training Supine-to-Sit: maximum assist (25% patient effort);  1 person assist  Bed Mobility Training Limitations: decreased ability to use arms for pushing/pulling;  decreased ability to use legs for bridging/pushing;  impaired ability to control trunk for mobility;  decreased strength;  impaired balance;  cognitive, decreased safety awareness    12/20 PT  Bed Mobility: Supine to Sit:     · Level of [place]	maximum assist (25% patients effort)  · Physical Assist/Nonphysical Assist	1 person assist; nonverbal cues (demo/gestures); verbal cues  · Assistive Device	bed rails    Bed Mobility Analysis:     · Bed Mobility Limitations	decreased ability to use legs for bridging/pushing  · Impairments Contributing to Impaired Bed Mobility	decreased strength; impaired balance; cognition    Transfer: Sit to Stand:     · Level of [place]	unable to perform; Pt lethargic, keeping eyes closed during assessment unable to attempt at this time    Transfer: Stand to Sit:     · Level of [place]	unable to perform    Gait Skills:     · Level of [place]	unable to perform      VITALS  T(C): 36.6 (01-10-24 @ 05:00), Max: 36.6 (01-10-24 @ 00:18)  HR: 82 (01-10-24 @ 05:00) (71 - 92)  BP: 167/77 (01-10-24 @ 05:00) (122/64 - 167/77)  RR: 18 (01-10-24 @ 00:18) (17 - 18)  SpO2: 93% (01-10-24 @ 05:00) (93% - 98%)  Wt(kg): --    MEDICATIONS   acetaminophen     Tablet .. 650 milliGRAM(s) every 6 hours PRN  artificial tears (preservative free) Ophthalmic Solution 1 Drop(s) every 6 hours PRN  atorvastatin 80 milliGRAM(s) at bedtime  chlorhexidine 2% Cloths 1 Application(s) <User Schedule>  doxazosin 2 milliGRAM(s) at bedtime  folic acid 1 milliGRAM(s) daily  lacosamide 50 milliGRAM(s) two times a day  magnesium sulfate  IVPB 2 Gram(s) once  metoprolol tartrate 25 milliGRAM(s) every 6 hours  Nephro-roma 1 Tablet(s) daily  pantoprazole  Injectable 40 milliGRAM(s) two times a day  vancomycin    Solution 125 milliGRAM(s) every 6 hours      RECENT LABS/IMAGING  - Reviewed Today                        9.7    5.92  )-----------( 197      ( 10 Ashok 2024 06:12 )             29.2     01-10    137  |  100  |  18.4  ----------------------------<  98  3.6   |  21.0<L>  |  0.77    Ca    9.6      10 Ashok 2024 06:12  Mg     1.5     01-10    TPro  6.4<L>  /  Alb  2.9<L>  /  TBili  0.3<L>  /  DBili  x   /  AST  14  /  ALT  12  /  AlkPhos  96  01-10      Urinalysis Basic - ( 10 Ashok 2024 06:12 )    Color: x / Appearance: x / SG: x / pH: x  Gluc: 98 mg/dL / Ketone: x  / Bili: x / Urobili: x   Blood: x / Protein: x / Nitrite: x   Leuk Esterase: x / RBC: x / WBC x   Sq Epi: x / Non Sq Epi: x / Bacteria: x            HEAD CT 11/16 - Improved to stable multicompartment intracranial hemorrhages. Right-sided subdural hygroma, larger in size.    US V DOPPLER BLE 11/16 - No evidence of deep venous thrombosis in either lower extremity.    HEAD CT 11/22 - Mixed attenuating subdural hematoma again seen with some expected postoperative changes. Evolving area of parenchymal hemorrhage involving the right frontal region.    HEAD CT 11/23 - Stable multicompartment intracranial hemorrhages. Stable right-sided subdural collection.    HEAD CT 11/29 - Stable intracranial hemorrhages.    HEAD CT 12/2  Right hemicraniectomy. Right subdural fluid attenuation collection as seen on the prior. Decreased conspicuity to regions of acute hemorrhage in the right high frontal parasagittal region as well as stable appearance to hemorrhage in the right insular posterior frontal region in association with lucency as seen on the prior study    HEAD CT 12/5 - Unchanged evolving hemorrhage within the RIGHT frontal lobe, medially and laterally with associated edema. RIGHT frontoparietal hemicraniectomy is again noted with unchanged extra-axial. Mild to moderate periventricular white matter ischemia noted.    HEAD CT 12/11 - No change since 12/9/2023.    BLE V DOPPLER 12/12 - No evidence of deep venous thrombosis in either lower extremity.  ----------------------------------------------------------------------------------------  PHYSICAL EXAM  Constitutional - NAD, Comfortable   HEENT - Right craniotomy   Extremities - No peripheral edema   Neurologic Exam -                    Cognitive - AAOx self     Communication - Delayed processing, Expressive Deficits, Perseverative     FUNCTIONAL MOTOR EXAM -                      LEFT    UE - ShAB 0/5, EF 0/5, EE 1/5,  0/5                    RIGHT UE - ShAB 3/5, EF 3/5, EE 3/5,  3/5                    LEFT    LE - HF 1/5, KE 1/5, DF 2/5                     RIGHT LE - HF 0/5, KE 0/5, DF 0/5, PF 0/5     Psych - Perseverative  ----------------------------------------------------------------------------------------  ASSESSMENT/PLAN  83yMale with functional deficits after a fall sustaining multicompartment intracranial bleeding  CDIFF - Vanco  Seizures - Vimpat  CAD - Lipitor   Oropharyngeal Dysphagia - Puree & MOD THICK Liquids  Pain - Tylenol  DVT PPX - SCD   Rehab/Impaired mobility and function - Patient continues to require hospitalization for the above diagnoses and ongoing active management of comorbid complications (functional deficits) that are substantially impairing functional ability and impairing quality of life necessitating ongoing medical management of these complications.     Patient with limited neurological recovery in his motor exam as well as dysphagia. Patient is expected to need long term physical assist. Continue to recommend HOLLY, patient DOES NOT meet acute inpatient rehabilitation criteria. Patient needs a more prolonged stay to achieve transition to community living and would not be able to tolerate a comprehensive/intense rehab program of 3hours/day.     Discussed with SW/CM.    Will sign off at this time. Thank you for allowing me to be part of your patient's care. Please reconsult PMR for additional rehab recommendations or dispo needs if functional status changes. Discussed the specific management and recommendations above with rehab clinical care team/rehab liaison.

## 2024-01-11 ENCOUNTER — TRANSCRIPTION ENCOUNTER (OUTPATIENT)
Age: 84
End: 2024-01-11

## 2024-01-11 LAB
ALBUMIN SERPL ELPH-MCNC: 2.5 G/DL — LOW (ref 3.3–5.2)
ALBUMIN SERPL ELPH-MCNC: 2.5 G/DL — LOW (ref 3.3–5.2)
ALP SERPL-CCNC: 96 U/L — SIGNIFICANT CHANGE UP (ref 40–120)
ALP SERPL-CCNC: 96 U/L — SIGNIFICANT CHANGE UP (ref 40–120)
ALT FLD-CCNC: 12 U/L — SIGNIFICANT CHANGE UP
ALT FLD-CCNC: 12 U/L — SIGNIFICANT CHANGE UP
ANION GAP SERPL CALC-SCNC: 11 MMOL/L — SIGNIFICANT CHANGE UP (ref 5–17)
ANION GAP SERPL CALC-SCNC: 11 MMOL/L — SIGNIFICANT CHANGE UP (ref 5–17)
AST SERPL-CCNC: 13 U/L — SIGNIFICANT CHANGE UP
AST SERPL-CCNC: 13 U/L — SIGNIFICANT CHANGE UP
BILIRUB SERPL-MCNC: <0.2 MG/DL — LOW (ref 0.4–2)
BILIRUB SERPL-MCNC: <0.2 MG/DL — LOW (ref 0.4–2)
BUN SERPL-MCNC: 21.2 MG/DL — HIGH (ref 8–20)
BUN SERPL-MCNC: 21.2 MG/DL — HIGH (ref 8–20)
CALCIUM SERPL-MCNC: 9.3 MG/DL — SIGNIFICANT CHANGE UP (ref 8.4–10.5)
CALCIUM SERPL-MCNC: 9.3 MG/DL — SIGNIFICANT CHANGE UP (ref 8.4–10.5)
CHLORIDE SERPL-SCNC: 103 MMOL/L — SIGNIFICANT CHANGE UP (ref 96–108)
CHLORIDE SERPL-SCNC: 103 MMOL/L — SIGNIFICANT CHANGE UP (ref 96–108)
CO2 SERPL-SCNC: 22 MMOL/L — SIGNIFICANT CHANGE UP (ref 22–29)
CO2 SERPL-SCNC: 22 MMOL/L — SIGNIFICANT CHANGE UP (ref 22–29)
CREAT SERPL-MCNC: 0.78 MG/DL — SIGNIFICANT CHANGE UP (ref 0.5–1.3)
CREAT SERPL-MCNC: 0.78 MG/DL — SIGNIFICANT CHANGE UP (ref 0.5–1.3)
EGFR: 89 ML/MIN/1.73M2 — SIGNIFICANT CHANGE UP
EGFR: 89 ML/MIN/1.73M2 — SIGNIFICANT CHANGE UP
GLUCOSE SERPL-MCNC: 111 MG/DL — HIGH (ref 70–99)
GLUCOSE SERPL-MCNC: 111 MG/DL — HIGH (ref 70–99)
HCT VFR BLD CALC: 29.1 % — LOW (ref 39–50)
HCT VFR BLD CALC: 29.1 % — LOW (ref 39–50)
HGB BLD-MCNC: 9.7 G/DL — LOW (ref 13–17)
HGB BLD-MCNC: 9.7 G/DL — LOW (ref 13–17)
MAGNESIUM SERPL-MCNC: 1.8 MG/DL — SIGNIFICANT CHANGE UP (ref 1.6–2.6)
MAGNESIUM SERPL-MCNC: 1.8 MG/DL — SIGNIFICANT CHANGE UP (ref 1.6–2.6)
MCHC RBC-ENTMCNC: 30.7 PG — SIGNIFICANT CHANGE UP (ref 27–34)
MCHC RBC-ENTMCNC: 30.7 PG — SIGNIFICANT CHANGE UP (ref 27–34)
MCHC RBC-ENTMCNC: 33.3 GM/DL — SIGNIFICANT CHANGE UP (ref 32–36)
MCHC RBC-ENTMCNC: 33.3 GM/DL — SIGNIFICANT CHANGE UP (ref 32–36)
MCV RBC AUTO: 92.1 FL — SIGNIFICANT CHANGE UP (ref 80–100)
MCV RBC AUTO: 92.1 FL — SIGNIFICANT CHANGE UP (ref 80–100)
PLATELET # BLD AUTO: 218 K/UL — SIGNIFICANT CHANGE UP (ref 150–400)
PLATELET # BLD AUTO: 218 K/UL — SIGNIFICANT CHANGE UP (ref 150–400)
POTASSIUM SERPL-MCNC: 3.9 MMOL/L — SIGNIFICANT CHANGE UP (ref 3.5–5.3)
POTASSIUM SERPL-MCNC: 3.9 MMOL/L — SIGNIFICANT CHANGE UP (ref 3.5–5.3)
POTASSIUM SERPL-SCNC: 3.9 MMOL/L — SIGNIFICANT CHANGE UP (ref 3.5–5.3)
POTASSIUM SERPL-SCNC: 3.9 MMOL/L — SIGNIFICANT CHANGE UP (ref 3.5–5.3)
PROT SERPL-MCNC: 6.4 G/DL — LOW (ref 6.6–8.7)
PROT SERPL-MCNC: 6.4 G/DL — LOW (ref 6.6–8.7)
RBC # BLD: 3.16 M/UL — LOW (ref 4.2–5.8)
RBC # BLD: 3.16 M/UL — LOW (ref 4.2–5.8)
RBC # FLD: 14.4 % — SIGNIFICANT CHANGE UP (ref 10.3–14.5)
RBC # FLD: 14.4 % — SIGNIFICANT CHANGE UP (ref 10.3–14.5)
SODIUM SERPL-SCNC: 136 MMOL/L — SIGNIFICANT CHANGE UP (ref 135–145)
SODIUM SERPL-SCNC: 136 MMOL/L — SIGNIFICANT CHANGE UP (ref 135–145)
WBC # BLD: 8.08 K/UL — SIGNIFICANT CHANGE UP (ref 3.8–10.5)
WBC # BLD: 8.08 K/UL — SIGNIFICANT CHANGE UP (ref 3.8–10.5)
WBC # FLD AUTO: 8.08 K/UL — SIGNIFICANT CHANGE UP (ref 3.8–10.5)
WBC # FLD AUTO: 8.08 K/UL — SIGNIFICANT CHANGE UP (ref 3.8–10.5)

## 2024-01-11 PROCEDURE — 99232 SBSQ HOSP IP/OBS MODERATE 35: CPT

## 2024-01-11 RX ADMIN — Medication 25 MILLIGRAM(S): at 18:55

## 2024-01-11 RX ADMIN — Medication 125 MILLIGRAM(S): at 01:09

## 2024-01-11 RX ADMIN — Medication 125 MILLIGRAM(S): at 05:23

## 2024-01-11 RX ADMIN — Medication 25 MILLIGRAM(S): at 05:26

## 2024-01-11 RX ADMIN — Medication 125 MILLIGRAM(S): at 18:55

## 2024-01-11 RX ADMIN — PANTOPRAZOLE SODIUM 40 MILLIGRAM(S): 20 TABLET, DELAYED RELEASE ORAL at 05:23

## 2024-01-11 RX ADMIN — CHLORHEXIDINE GLUCONATE 1 APPLICATION(S): 213 SOLUTION TOPICAL at 05:26

## 2024-01-11 RX ADMIN — Medication 25 MILLIGRAM(S): at 01:08

## 2024-01-11 RX ADMIN — Medication 25 MILLIGRAM(S): at 11:11

## 2024-01-11 RX ADMIN — Medication 1 MILLIGRAM(S): at 11:11

## 2024-01-11 RX ADMIN — PANTOPRAZOLE SODIUM 40 MILLIGRAM(S): 20 TABLET, DELAYED RELEASE ORAL at 18:55

## 2024-01-11 RX ADMIN — Medication 1 TABLET(S): at 11:11

## 2024-01-11 RX ADMIN — Medication 125 MILLIGRAM(S): at 11:10

## 2024-01-11 NOTE — DISCHARGE NOTE NURSING/CASE MANAGEMENT/SOCIAL WORK - NSDCPEFALRISK_GEN_ALL_CORE
For information on Fall & Injury Prevention, visit: [email or web address] OR  [email or web address] OR  https://www.cdc.gov/steadi/patient.html For information on Fall & Injury Prevention, visit: [email or web address] OR  [email or web address] OR  https://www.cdc.gov/steadi/patient.html

## 2024-01-11 NOTE — DISCHARGE NOTE NURSING/CASE MANAGEMENT/SOCIAL WORK - NSDCFUADDAPPT_GEN_ALL_CORE_FT
[facility]  [address] [place] [address]  [phone] [facility]  [address] [place] [address]  [phone]

## 2024-01-11 NOTE — DISCHARGE NOTE NURSING/CASE MANAGEMENT/SOCIAL WORK - SOCIAL WORKER'S NAME
Madalyn Amor  	 Laquita Aguilar, [facility]   	 [facility]quita Aguilar, [facility]

## 2024-01-11 NOTE — PROGRESS NOTE ADULT - SUBJECTIVE AND OBJECTIVE BOX
Patient is a 83y old  Male who presents with a chief complaint of s/p unwitnessed fall with head strike (10 Ashok 2024 14:30)      INTERVAL HPI/OVERNIGHT EVENTS:  No acute events reported overnight.    TODAY:      MEDICATIONS  (STANDING):  atorvastatin 80 milliGRAM(s) Oral at bedtime  chlorhexidine 2% Cloths 1 Application(s) Topical <User Schedule>  doxazosin 2 milliGRAM(s) Oral at bedtime  folic acid 1 milliGRAM(s) Oral daily  lacosamide 50 milliGRAM(s) Oral two times a day  metoprolol tartrate 25 milliGRAM(s) Oral every 6 hours  Nephro-roma 1 Tablet(s) Oral daily  pantoprazole  Injectable 40 milliGRAM(s) IV Push two times a day  vancomycin    Solution 125 milliGRAM(s) Oral every 6 hours    MEDICATIONS  (PRN):  acetaminophen     Tablet .. 650 milliGRAM(s) Oral every 6 hours PRN Temp greater or equal to 38C (100.4F), Mild Pain (1 - 3)  artificial tears (preservative free) Ophthalmic Solution 1 Drop(s) Both EYES every 6 hours PRN Dry Eyes      Allergies    penicillin (Rash)  heparin (Other (Severe))  penicillin (Hives)  Cipro (Other)  Levaquin (Other)  heparin (Other)    Intolerances        REVIEW OF SYSTEMS:      Vital Signs Last 24 Hrs  T(C): 37 (11 Jan 2024 08:48), Max: 37.1 (11 Jan 2024 03:00)  T(F): 98.6 (11 Jan 2024 08:48), Max: 98.8 (11 Jan 2024 03:00)  HR: 114 (11 Jan 2024 11:11) (60 - 114)  BP: 145/83 (11 Jan 2024 11:11) (113/68 - 145/83)  BP(mean): --  RR: 18 (11 Jan 2024 08:48) (18 - 18)  SpO2: 94% (11 Jan 2024 08:48) (94% - 96%)    Parameters below as of 11 Jan 2024 08:48  Patient On (Oxygen Delivery Method): room air        PHYSICAL EXAM:      LABS:                        9.7    8.08  )-----------( 218      ( 11 Jan 2024 06:41 )             29.1     01-11    136  |  103  |  21.2<H>  ----------------------------<  111<H>  3.9   |  22.0  |  0.78    Ca    9.3      11 Jan 2024 06:41  Mg     1.8     01-11    TPro  6.4<L>  /  Alb  2.5<L>  /  TBili  <0.2<L>  /  DBili  x   /  AST  13  /  ALT  12  /  AlkPhos  96  01-11      Urinalysis Basic - ( 11 Jan 2024 06:41 )    Color: x / Appearance: x / SG: x / pH: x  Gluc: 111 mg/dL / Ketone: x  / Bili: x / Urobili: x   Blood: x / Protein: x / Nitrite: x   Leuk Esterase: x / RBC: x / WBC x   Sq Epi: x / Non Sq Epi: x / Bacteria: x      CAPILLARY BLOOD GLUCOSE          RADIOLOGY & ADDITIONAL TESTS:    Imaging Personally Reviewed:  [ ] YES  [ ] NO    Consultant(s) Notes Reviewed:  [ ] YES  [ ] NO    Care Discussed with Consultants/Other Providers [ ] YES  [ ] NO    Plan of Care discussed with Housestaff [ ]YES [ ] NO Patient is a 83y old  Male who presents with a chief complaint of s/p unwitnessed fall with head strike (10 Ashok 2024 14:30)      INTERVAL HPI/OVERNIGHT EVENTS:  No acute events reported overnight. Afebrile, hemodynamically stable.    MEDICATIONS  (STANDING):  atorvastatin 80 milliGRAM(s) Oral at bedtime  chlorhexidine 2% Cloths 1 Application(s) Topical <User Schedule>  doxazosin 2 milliGRAM(s) Oral at bedtime  folic acid 1 milliGRAM(s) Oral daily  lacosamide 50 milliGRAM(s) Oral two times a day  metoprolol tartrate 25 milliGRAM(s) Oral every 6 hours  Nephro-roma 1 Tablet(s) Oral daily  pantoprazole  Injectable 40 milliGRAM(s) IV Push two times a day  vancomycin    Solution 125 milliGRAM(s) Oral every 6 hours    MEDICATIONS  (PRN):  acetaminophen     Tablet .. 650 milliGRAM(s) Oral every 6 hours PRN Temp greater or equal to 38C (100.4F), Mild Pain (1 - 3)  artificial tears (preservative free) Ophthalmic Solution 1 Drop(s) Both EYES every 6 hours PRN Dry Eyes      Allergies    penicillin (Rash)  heparin (Other (Severe))  penicillin (Hives)  Cipro (Other)  Levaquin (Other)  heparin (Other)    Intolerances        REVIEW OF SYSTEMS:  Review of systems is otherwise negative unless mentioned in HPI above.    Vital Signs Last 24 Hrs  T(C): 37 (11 Jan 2024 08:48), Max: 37.1 (11 Jan 2024 03:00)  T(F): 98.6 (11 Jan 2024 08:48), Max: 98.8 (11 Jan 2024 03:00)  HR: 114 (11 Jan 2024 11:11) (60 - 114)  BP: 145/83 (11 Jan 2024 11:11) (113/68 - 145/83)  BP(mean): --  RR: 18 (11 Jan 2024 08:48) (18 - 18)  SpO2: 94% (11 Jan 2024 08:48) (94% - 96%)    Parameters below as of 11 Jan 2024 08:48  Patient On (Oxygen Delivery Method): room air        PHYSICAL EXAM:  CONSTITUTIONAL: NAD, A&O x2, awake alert  ENMT: Healed surgical scar on right side  RESPIRATORY: Normal respiratory effort; lungs are clear to auscultation bilaterally  CARDIOVASCULAR: Regular rate and rhythm, normal S1 and S2, no murmur/rub/gallop.  ABDOMEN: Nontender to palpation, no rebound/guarding; No hepatosplenomegaly  MUSCLOSKELETAL:  No edema  NEUROLOGY: Awake, oriented x 2. Left sided hemiplegia   SKIN : stage 2 sacral decub       LABS:                        9.7    8.08  )-----------( 218      ( 11 Jan 2024 06:41 )             29.1     01-11    136  |  103  |  21.2<H>  ----------------------------<  111<H>  3.9   |  22.0  |  0.78    Ca    9.3      11 Jan 2024 06:41  Mg     1.8     01-11    TPro  6.4<L>  /  Alb  2.5<L>  /  TBili  <0.2<L>  /  DBili  x   /  AST  13  /  ALT  12  /  AlkPhos  96  01-11      Urinalysis Basic - ( 11 Jan 2024 06:41 )    Color: x / Appearance: x / SG: x / pH: x  Gluc: 111 mg/dL / Ketone: x  / Bili: x / Urobili: x   Blood: x / Protein: x / Nitrite: x   Leuk Esterase: x / RBC: x / WBC x   Sq Epi: x / Non Sq Epi: x / Bacteria: x      CAPILLARY BLOOD GLUCOSE          RADIOLOGY & ADDITIONAL TESTS:    Imaging Personally Reviewed:  [ ] YES  [ ] NO    Consultant(s) Notes Reviewed:  [ ] YES  [ ] NO    Care Discussed with Consultants/Other Providers [ ] YES  [ ] NO    Plan of Care discussed with Housestaff [ ]YES [ ] NO

## 2024-01-11 NOTE — PROGRESS NOTE ADULT - ATTENDING COMMENTS
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 Continue PO Vancomycin until 1/12 for C Diff. MBS with speech tomorrow AM. 82 y/o M admitted for encephalopathy secondary to ICH, aspiration pneumonia. Continue PO Vancomycin until 1/12 for C Diff. MBS with speech tomorrow AM.

## 2024-01-11 NOTE — DISCHARGE NOTE NURSING/CASE MANAGEMENT/SOCIAL WORK - PATIENT PORTAL LINK FT
You can access the FollowMyHealth Patient Portal offered by [facility] by registering at the following website: [email or web address]. By joining [employer]’s FollowMyHealth portal, you will also be able to view your health information using other applications (apps) compatible with our system. You can access the FollowMyHealth Patient Portal offered by [facility] by registering at the following website: [email or web address]. By joining [employer]’s FollowMyHealth portal, you will also be able to view your health information using other applications (apps) compatible with our system.

## 2024-01-11 NOTE — PROGRESS NOTE ADULT - ASSESSMENT
83M w/ PMHX of HTN, CAD s/p PCI, renal cell ca s/p L nephrectomy, bladder CA, BPH, CHF, LBBB, vertigo,  HLD, 5.5cm AAA s/p post EVAR, paroxysmal afib on Eliquis, Plavix, ASA, h/o HIT, early stage Alzheimer's dementia, trf from [facility] 11/10/23 s/p fall found with multicompartmental ICH, s/p R craniectomy 11/10 and eventual cranioplasty 12/7/23. Hospital course complicated by afib with RVR, Klebsiella pneumonia, LUE DVT, multiple subsequent episodes of ICH after attempting to restart Eliquis, now with acute respiratory failure, aspiration pneumonia, shock, hematemesis.    # Encephalopathy, multifactorial. ICH, CVA, ICU, possible delirium  Waxing, waning  - Needs frequent reorientation  - Neurology signed off  - Mental status improved after holding Anti-epileptics    # ICH s/p craniotomy and cranioplasty  # Abnormal EEG  - Potential epileptogenic focus in the right anterior head region with underlying cerebral dysfunction and evidence for overlying skull defect  - Family concerned about Antiseizure medications making patient lethargic and confused  - Had long conversation with family, they request to stop antiseizure medication.   - Family understands risks that patient may have a seizure which can lead to status epilepticus or even death.  - Family agreeable to decreasing dose of antiseizure medication.  - Vimpat 50mg BID held    # Aspiration pneumonia  - Aspiration precautions  - Monitor for hypoxia  - s/p Meropenem  - ID follow up noted     # C diff   - c/w vancomycin 125mg Q 6h   - 2 weeks course to be completed on 1/12/2024    # Dysphagia  - Puree with moderately thick liquids  - SLP evaluation noted    # fungemia:  candida galbrata  - Completed Caspofungin course  - ID consult appreciated    # Atrial Fibrillation  - Metoprolol 25mg q6h  - No anticoagulation given ICH    # anemia: likely multifactorial  - s/p PRBC  - Protonix 40mg BID  - no intervention per GI    DVT ppx  - SCD    DISPO: [facility] as per PT. However family preferring Acute rehab. Patient seen by PM&R, not a candidate for Acute rehab. Family agreeable to HO[facility] now.   83M w/ PMHX of HTN, CAD s/p PCI, renal cell ca s/p L nephrectomy, bladder CA, BPH, CHF, LBBB, vertigo,  HLD, 5.5cm AAA s/p post EVAR, paroxysmal afib on Eliquis, Plavix, ASA, h/o HIT, early stage Alzheimer's dementia, trf from [place] 11/10/23 s/p fall found with multicompartmental ICH, s/p R craniectomy 11/10 and eventual cranioplasty 12/7/23. Hospital course complicated by afib with RVR, Klebsiella pneumonia, LUE DVT, multiple subsequent episodes of ICH after attempting to restart Eliquis, now with acute respiratory failure, aspiration pneumonia, shock, hematemesis.    # Encephalopathy, multifactorial. ICH, CVA, ICU, possible delirium  Waxing, waning  - Needs frequent reorientation  - Neurology signed off  - Mental status improved after holding Anti-epileptics    # ICH s/p craniotomy and cranioplasty  # Abnormal EEG  - Potential epileptogenic focus in the right anterior head region with underlying cerebral dysfunction and evidence for overlying skull defect  - Family concerned about Antiseizure medications making patient lethargic and confused  - Had long conversation with family, they request to stop antiseizure medication.   - Family understands risks that patient may have a seizure which can lead to status epilepticus or even death.  - Family agreeable to decreasing dose of antiseizure medication.  - Vimpat 50mg BID held    # Aspiration pneumonia  - Aspiration precautions  - Monitor for hypoxia  - s/p Meropenem  - ID follow up noted     # C diff   - c/w vancomycin 125mg Q 6h   - 2 weeks course to be completed on 1/12/2024    # Dysphagia  - Puree with moderately thick liquids  - SLP evaluation noted    # fungemia:  candida galbrata  - Completed Caspofungin course  - ID consult appreciated    # Atrial Fibrillation  - Metoprolol 25mg q6h  - No anticoagulation given ICH    # anemia: likely multifactorial  - s/p PRBC  - Protonix 40mg BID  - no intervention per GI    DVT ppx  - SCD    DISPO: [facility] as per PT. However family preferring Acute rehab. Patient seen by PM&R, not a candidate for Acute rehab. Family agreeable to [facility] now.   83M w/ PMHX of HTN, CAD s/p PCI, renal cell ca s/p L nephrectomy, bladder CA, BPH, CHF, LBBB, vertigo,  HLD, 5.5cm AAA s/p post EVAR, paroxysmal afib on Eliquis, Plavix, ASA, h/o HIT, early stage Alzheimer's dementia, trf from [place] 11/10/23 s/p fall found with multicompartmental ICH, s/p R craniectomy 11/10 and eventual cranioplasty 12/7/23. Hospital course complicated by afib with RVR, Klebsiella pneumonia, LUE DVT, multiple subsequent episodes of ICH after attempting to restart Eliquis, now with acute respiratory failure, aspiration pneumonia, shock, hematemesis.    # Encephalopathy, multifactorial. ICH, CVA, ICU, possible delirium  Waxing, waning  - Needs frequent reorientation  - Neurology signed off  - Mental status improved after holding Anti-epileptics    # ICH s/p craniotomy and cranioplasty  # Abnormal EEG  - Potential epileptogenic focus in the right anterior head region with underlying cerebral dysfunction and evidence for overlying skull defect  - Family concerned about Antiseizure medications making patient lethargic and confused  - Had long conversation with family, they request to stop antiseizure medication.   - Family understands risks that patient may have a seizure which can lead to status epilepticus or even death.  - Family agreeable to decreasing dose of antiseizure medication.  - Vimpat 50mg BID held    # Aspiration pneumonia  - Aspiration precautions  - Monitor for hypoxia  - s/p Meropenem  - ID follow up noted     # C diff   - C/w vancomycin 125mg Q 6h   - 2 weeks course to be completed on 1/12/2024    # Dysphagia  - Puree with moderately thick liquids  - SLP evaluation noted    # fungemia:  candida galbrata  - Completed Caspofungin course  - ID consult appreciated    # Atrial Fibrillation  - Metoprolol 25mg q6h  - No anticoagulation given ICH    # anemia: likely multifactorial  - S/p PRBC  - Protonix 40mg BID  - No intervention per GI    DVT ppx  - SCD    DISPO: [facility] as per PT. However family preferring Acute rehab. Patient seen by PM&R, not a candidate for Acute rehab. Family agreeable to [facility] now.   83M w/ PMHX of HTN, CAD s/p PCI, renal cell ca s/p L nephrectomy, bladder CA, BPH, CHF, LBBB, vertigo,  HLD, 5.5cm AAA s/p post EVAR, paroxysmal afib on Eliquis, Plavix, ASA, h/o HIT, early stage Alzheimer's dementia, trf from [place] 11/10/23 s/p fall found with multicompartmental ICH, s/p R craniectomy 11/10 and eventual cranioplasty 12/7/23. Hospital course complicated by afib with RVR, Klebsiella pneumonia, LUE DVT, multiple subsequent episodes of ICH after attempting to restart Eliquis, now with acute respiratory failure, aspiration pneumonia, shock, hematemesis.    # Encephalopathy, multifactorial. ICH, CVA, ICU, possible delirium  Waxing, waning  - Needs frequent reorientation  - Neurology signed off  - Mental status improved after holding Anti-epileptics    # ICH s/p craniotomy and cranioplasty  # Abnormal EEG  - Potential epileptogenic focus in the right anterior head region with underlying cerebral dysfunction and evidence for overlying skull defect  - Family concerned about Antiseizure medications making patient lethargic and confused  - Had long conversation with family, they request to stop antiseizure medication.   - Family understands risks that patient may have a seizure which can lead to status epilepticus or even death.  - Family agreeable to decreasing dose of antiseizure medication.  - Vimpat 50mg BID held    # Aspiration pneumonia  - Aspiration precautions  - Monitor for hypoxia  - s/p Meropenem  - ID follow up noted     # C diff   - C/w vancomycin 125mg Q 6h   - 2 weeks course to be completed on 1/12/2024    # Dysphagia  - Puree with moderately thick liquids  - SLP evaluation noted    # fungemia:  candida galbrata  - Completed Caspofungin course  - ID consult appreciated    # Atrial Fibrillation  - Metoprolol 25mg q6h  - No anticoagulation given ICH    # anemia: likely multifactorial  - S/p PRBC  - Protonix 40mg BID  - No intervention per GI    DVT ppx  - SCD    DISPO: [facility] as per PT. However family preferring Acute rehab. Patient seen by PM&R, not a candidate for Acute rehab. Family agreeable to [facility] now.   83M w/ PMHX of HTN, CAD s/p PCI, renal cell ca s/p L nephrectomy, bladder CA, BPH, CHF, LBBB, vertigo,  HLD, 5.5cm AAA s/p post EVAR, paroxysmal afib on Eliquis, Plavix, ASA, h/o HIT, early stage Alzheimer's dementia, trf from [place] 11/10/23 s/p fall found with multicompartmental ICH, s/p R craniectomy 11/10 and eventual cranioplasty 12/7/23. Hospital course complicated by afib with RVR, Klebsiella pneumonia, LUE DVT, multiple subsequent episodes of ICH after attempting to restart Eliquis, now with acute respiratory failure, aspiration pneumonia, shock, hematemesis.    # Encephalopathy, multifactorial. ICH, CVA, ICU, possible delirium  Waxing, waning  - Needs frequent reorientation  - Neurology signed off  - Mental status improved after holding Anti-epileptics    # ICH s/p craniotomy and cranioplasty  # Abnormal EEG  - Potential epileptogenic focus in the right anterior head region with underlying cerebral dysfunction and evidence for overlying skull defect  - Family concerned about Antiseizure medications making patient lethargic and confused  - Had long conversation with family, they request to stop antiseizure medication.   - Family understands risks that patient may have a seizure which can lead to status epilepticus or even death.  - Family agreeable to decreasing dose of antiseizure medication.  - Vimpat 50mg BID held    # Aspiration pneumonia  - Aspiration precautions  - Monitor for hypoxia  - s/p Meropenem  - ID follow up noted     # C diff   - C/w vancomycin 125mg Q 6h   - 2 weeks course to be completed on 1/12/2024    # Dysphagia  - Puree with moderately thick liquids  - SLP evaluation noted  - Will get MBS with speech tomorrow    # fungemia:  candida galbrata  - Completed Caspofungin course  - ID consult appreciated    # Atrial Fibrillation  - Metoprolol 25mg q6h  - No anticoagulation given ICH    # anemia: likely multifactorial  - S/p PRBC  - Protonix 40mg BID  - No intervention per GI    DVT ppx  - SCD    DISPO: [facility] as per PT. However family preferring Acute rehab. Patient seen by PM&R, not a candidate for Acute rehab. Family agreeable to HO[facility] now.   83M w/ PMHX of HTN, CAD s/p PCI, renal cell ca s/p L nephrectomy, bladder CA, BPH, CHF, LBBB, vertigo,  HLD, 5.5cm AAA s/p post EVAR, paroxysmal afib on Eliquis, Plavix, ASA, h/o HIT, early stage Alzheimer's dementia, trf from [place] 11/10/23 s/p fall found with multicompartmental ICH, s/p R craniectomy 11/10 and eventual cranioplasty 12/7/23. Hospital course complicated by afib with RVR, Klebsiella pneumonia, LUE DVT, multiple subsequent episodes of ICH after attempting to restart Eliquis, now with acute respiratory failure, aspiration pneumonia, shock, hematemesis.    # Encephalopathy, multifactorial. ICH, CVA, ICU, possible delirium  Waxing, waning  - Needs frequent reorientation  - Neurology signed off  - Mental status improved after holding Anti-epileptics    # ICH s/p craniotomy and cranioplasty  # Abnormal EEG  - Potential epileptogenic focus in the right anterior head region with underlying cerebral dysfunction and evidence for overlying skull defect  - Family concerned about Antiseizure medications making patient lethargic and confused  - Had long conversation with family, they request to stop antiseizure medication.   - Family understands risks that patient may have a seizure which can lead to status epilepticus or even death.  - Family agreeable to decreasing dose of antiseizure medication.  - Vimpat 50mg BID held    # Aspiration pneumonia  - Aspiration precautions  - Monitor for hypoxia  - s/p Meropenem  - ID follow up noted     # C diff   - C/w vancomycin 125mg Q 6h   - 2 weeks course to be completed on 1/12/2024    # Dysphagia  - Puree with moderately thick liquids  - SLP evaluation noted  - Will get MBS with speech tomorrow    # fungemia:  candida galbrata  - Completed Caspofungin course  - ID consult appreciated    # Atrial Fibrillation  - Metoprolol 25mg q6h  - No anticoagulation given ICH    # anemia: likely multifactorial  - S/p PRBC  - Protonix 40mg BID  - No intervention per GI    DVT ppx  - SCD    DISPO: [facility] as per PT. However family preferring Acute rehab. Patient seen by PM&R, not a candidate for Acute rehab. Family agreeable to [facility] now.

## 2024-01-12 ENCOUNTER — TRANSCRIPTION ENCOUNTER (OUTPATIENT)
Age: 84
End: 2024-01-12

## 2024-01-12 VITALS
HEART RATE: 90 BPM | DIASTOLIC BLOOD PRESSURE: 79 MMHG | OXYGEN SATURATION: 95 % | RESPIRATION RATE: 18 BRPM | TEMPERATURE: 99 F | SYSTOLIC BLOOD PRESSURE: 138 MMHG

## 2024-01-12 DIAGNOSIS — A04.72 ENTEROCOLITIS DUE TO CLOSTRIDIUM DIFFICILE, NOT SPECIFIED AS RECURRENT: ICD-10-CM

## 2024-01-12 DIAGNOSIS — J15.0 PNEUMONIA DUE TO KLEBSIELLA PNEUMONIAE: ICD-10-CM

## 2024-01-12 LAB
ALBUMIN SERPL ELPH-MCNC: 2.8 G/DL — LOW (ref 3.3–5.2)
ALBUMIN SERPL ELPH-MCNC: 2.8 G/DL — LOW (ref 3.3–5.2)
ALP SERPL-CCNC: 101 U/L — SIGNIFICANT CHANGE UP (ref 40–120)
ALP SERPL-CCNC: 101 U/L — SIGNIFICANT CHANGE UP (ref 40–120)
ALT FLD-CCNC: 12 U/L — SIGNIFICANT CHANGE UP
ALT FLD-CCNC: 12 U/L — SIGNIFICANT CHANGE UP
ANION GAP SERPL CALC-SCNC: 14 MMOL/L — SIGNIFICANT CHANGE UP (ref 5–17)
ANION GAP SERPL CALC-SCNC: 14 MMOL/L — SIGNIFICANT CHANGE UP (ref 5–17)
AST SERPL-CCNC: 13 U/L — SIGNIFICANT CHANGE UP
AST SERPL-CCNC: 13 U/L — SIGNIFICANT CHANGE UP
BILIRUB SERPL-MCNC: 0.3 MG/DL — LOW (ref 0.4–2)
BILIRUB SERPL-MCNC: 0.3 MG/DL — LOW (ref 0.4–2)
BUN SERPL-MCNC: 20.7 MG/DL — HIGH (ref 8–20)
BUN SERPL-MCNC: 20.7 MG/DL — HIGH (ref 8–20)
CALCIUM SERPL-MCNC: 9.4 MG/DL — SIGNIFICANT CHANGE UP (ref 8.4–10.5)
CALCIUM SERPL-MCNC: 9.4 MG/DL — SIGNIFICANT CHANGE UP (ref 8.4–10.5)
CHLORIDE SERPL-SCNC: 103 MMOL/L — SIGNIFICANT CHANGE UP (ref 96–108)
CHLORIDE SERPL-SCNC: 103 MMOL/L — SIGNIFICANT CHANGE UP (ref 96–108)
CO2 SERPL-SCNC: 21 MMOL/L — LOW (ref 22–29)
CO2 SERPL-SCNC: 21 MMOL/L — LOW (ref 22–29)
CREAT SERPL-MCNC: 0.78 MG/DL — SIGNIFICANT CHANGE UP (ref 0.5–1.3)
CREAT SERPL-MCNC: 0.78 MG/DL — SIGNIFICANT CHANGE UP (ref 0.5–1.3)
EGFR: 89 ML/MIN/1.73M2 — SIGNIFICANT CHANGE UP
EGFR: 89 ML/MIN/1.73M2 — SIGNIFICANT CHANGE UP
GLUCOSE SERPL-MCNC: 110 MG/DL — HIGH (ref 70–99)
GLUCOSE SERPL-MCNC: 110 MG/DL — HIGH (ref 70–99)
HCT VFR BLD CALC: 29.5 % — LOW (ref 39–50)
HCT VFR BLD CALC: 29.5 % — LOW (ref 39–50)
HGB BLD-MCNC: 9.9 G/DL — LOW (ref 13–17)
HGB BLD-MCNC: 9.9 G/DL — LOW (ref 13–17)
MAGNESIUM SERPL-MCNC: 1.7 MG/DL — SIGNIFICANT CHANGE UP (ref 1.6–2.6)
MAGNESIUM SERPL-MCNC: 1.7 MG/DL — SIGNIFICANT CHANGE UP (ref 1.6–2.6)
MCHC RBC-ENTMCNC: 30.7 PG — SIGNIFICANT CHANGE UP (ref 27–34)
MCHC RBC-ENTMCNC: 30.7 PG — SIGNIFICANT CHANGE UP (ref 27–34)
MCHC RBC-ENTMCNC: 33.6 GM/DL — SIGNIFICANT CHANGE UP (ref 32–36)
MCHC RBC-ENTMCNC: 33.6 GM/DL — SIGNIFICANT CHANGE UP (ref 32–36)
MCV RBC AUTO: 91.6 FL — SIGNIFICANT CHANGE UP (ref 80–100)
MCV RBC AUTO: 91.6 FL — SIGNIFICANT CHANGE UP (ref 80–100)
PLATELET # BLD AUTO: 231 K/UL — SIGNIFICANT CHANGE UP (ref 150–400)
PLATELET # BLD AUTO: 231 K/UL — SIGNIFICANT CHANGE UP (ref 150–400)
POTASSIUM SERPL-MCNC: 3.5 MMOL/L — SIGNIFICANT CHANGE UP (ref 3.5–5.3)
POTASSIUM SERPL-MCNC: 3.5 MMOL/L — SIGNIFICANT CHANGE UP (ref 3.5–5.3)
POTASSIUM SERPL-SCNC: 3.5 MMOL/L — SIGNIFICANT CHANGE UP (ref 3.5–5.3)
POTASSIUM SERPL-SCNC: 3.5 MMOL/L — SIGNIFICANT CHANGE UP (ref 3.5–5.3)
PROT SERPL-MCNC: 6.5 G/DL — LOW (ref 6.6–8.7)
PROT SERPL-MCNC: 6.5 G/DL — LOW (ref 6.6–8.7)
RBC # BLD: 3.22 M/UL — LOW (ref 4.2–5.8)
RBC # BLD: 3.22 M/UL — LOW (ref 4.2–5.8)
RBC # FLD: 14.6 % — HIGH (ref 10.3–14.5)
RBC # FLD: 14.6 % — HIGH (ref 10.3–14.5)
SODIUM SERPL-SCNC: 138 MMOL/L — SIGNIFICANT CHANGE UP (ref 135–145)
SODIUM SERPL-SCNC: 138 MMOL/L — SIGNIFICANT CHANGE UP (ref 135–145)
WBC # BLD: 9.57 K/UL — SIGNIFICANT CHANGE UP (ref 3.8–10.5)
WBC # BLD: 9.57 K/UL — SIGNIFICANT CHANGE UP (ref 3.8–10.5)
WBC # FLD AUTO: 9.57 K/UL — SIGNIFICANT CHANGE UP (ref 3.8–10.5)
WBC # FLD AUTO: 9.57 K/UL — SIGNIFICANT CHANGE UP (ref 3.8–10.5)

## 2024-01-12 PROCEDURE — 82570 ASSAY OF URINE CREATININE: CPT

## 2024-01-12 PROCEDURE — 95714 VEEG EA 12-26 HR UNMNTR: CPT

## 2024-01-12 PROCEDURE — 0225U NFCT DS DNA&RNA 21 SARSCOV2: CPT

## 2024-01-12 PROCEDURE — 84540 ASSAY OF URINE/UREA-N: CPT

## 2024-01-12 PROCEDURE — 88311 DECALCIFY TISSUE: CPT

## 2024-01-12 PROCEDURE — 87116 MYCOBACTERIA CULTURE: CPT

## 2024-01-12 PROCEDURE — 84157 ASSAY OF PROTEIN OTHER: CPT

## 2024-01-12 PROCEDURE — 85027 COMPLETE CBC AUTOMATED: CPT

## 2024-01-12 PROCEDURE — 36415 COLL VENOUS BLD VENIPUNCTURE: CPT

## 2024-01-12 PROCEDURE — 83735 ASSAY OF MAGNESIUM: CPT

## 2024-01-12 PROCEDURE — C9254: CPT

## 2024-01-12 PROCEDURE — 86592 SYPHILIS TEST NON-TREP QUAL: CPT

## 2024-01-12 PROCEDURE — 87493 C DIFF AMPLIFIED PROBE: CPT

## 2024-01-12 PROCEDURE — 88304 TISSUE EXAM BY PATHOLOGIST: CPT

## 2024-01-12 PROCEDURE — 71260 CT THORAX DX C+: CPT

## 2024-01-12 PROCEDURE — 87324 CLOSTRIDIUM AG IA: CPT

## 2024-01-12 PROCEDURE — 86850 RBC ANTIBODY SCREEN: CPT

## 2024-01-12 PROCEDURE — P9016: CPT

## 2024-01-12 PROCEDURE — 74018 RADEX ABDOMEN 1 VIEW: CPT

## 2024-01-12 PROCEDURE — 82435 ASSAY OF BLOOD CHLORIDE: CPT

## 2024-01-12 PROCEDURE — 82728 ASSAY OF FERRITIN: CPT

## 2024-01-12 PROCEDURE — 87640 STAPH A DNA AMP PROBE: CPT

## 2024-01-12 PROCEDURE — 93306 TTE W/DOPPLER COMPLETE: CPT

## 2024-01-12 PROCEDURE — 89051 BODY FLUID CELL COUNT: CPT

## 2024-01-12 PROCEDURE — 36430 TRANSFUSION BLD/BLD COMPNT: CPT

## 2024-01-12 PROCEDURE — 93971 EXTREMITY STUDY: CPT

## 2024-01-12 PROCEDURE — 99232 SBSQ HOSP IP/OBS MODERATE 35: CPT

## 2024-01-12 PROCEDURE — 31720 CLEARANCE OF AIRWAYS: CPT

## 2024-01-12 PROCEDURE — 86901 BLOOD TYPING SEROLOGIC RH(D): CPT

## 2024-01-12 PROCEDURE — 93005 ELECTROCARDIOGRAM TRACING: CPT

## 2024-01-12 PROCEDURE — 83605 ASSAY OF LACTIC ACID: CPT

## 2024-01-12 PROCEDURE — 87102 FUNGUS ISOLATION CULTURE: CPT

## 2024-01-12 PROCEDURE — 84133 ASSAY OF URINE POTASSIUM: CPT

## 2024-01-12 PROCEDURE — 93880 EXTRACRANIAL BILAT STUDY: CPT

## 2024-01-12 PROCEDURE — 85610 PROTHROMBIN TIME: CPT

## 2024-01-12 PROCEDURE — 84132 ASSAY OF SERUM POTASSIUM: CPT

## 2024-01-12 PROCEDURE — 82945 GLUCOSE OTHER FLUID: CPT

## 2024-01-12 PROCEDURE — 97535 SELF CARE MNGMENT TRAINING: CPT

## 2024-01-12 PROCEDURE — P9100: CPT

## 2024-01-12 PROCEDURE — 82550 ASSAY OF CK (CPK): CPT

## 2024-01-12 PROCEDURE — 87040 BLOOD CULTURE FOR BACTERIA: CPT

## 2024-01-12 PROCEDURE — 86789 WEST NILE VIRUS ANTIBODY: CPT

## 2024-01-12 PROCEDURE — 86900 BLOOD TYPING SEROLOGIC ABO: CPT

## 2024-01-12 PROCEDURE — 85730 THROMBOPLASTIN TIME PARTIAL: CPT

## 2024-01-12 PROCEDURE — 84484 ASSAY OF TROPONIN QUANT: CPT

## 2024-01-12 PROCEDURE — 81001 URINALYSIS AUTO W/SCOPE: CPT

## 2024-01-12 PROCEDURE — 70498 CT ANGIOGRAPHY NECK: CPT | Mod: MA

## 2024-01-12 PROCEDURE — 70551 MRI BRAIN STEM W/O DYE: CPT

## 2024-01-12 PROCEDURE — 71045 X-RAY EXAM CHEST 1 VIEW: CPT

## 2024-01-12 PROCEDURE — C1713: CPT

## 2024-01-12 PROCEDURE — 92526 ORAL FUNCTION THERAPY: CPT

## 2024-01-12 PROCEDURE — 97116 GAIT TRAINING THERAPY: CPT

## 2024-01-12 PROCEDURE — C1769: CPT

## 2024-01-12 PROCEDURE — 97530 THERAPEUTIC ACTIVITIES: CPT

## 2024-01-12 PROCEDURE — 87077 CULTURE AEROBIC IDENTIFY: CPT

## 2024-01-12 PROCEDURE — 80202 ASSAY OF VANCOMYCIN: CPT

## 2024-01-12 PROCEDURE — 87070 CULTURE OTHR SPECIMN AEROBIC: CPT

## 2024-01-12 PROCEDURE — 70450 CT HEAD/BRAIN W/O DYE: CPT | Mod: MA

## 2024-01-12 PROCEDURE — 84145 PROCALCITONIN (PCT): CPT

## 2024-01-12 PROCEDURE — P9047: CPT

## 2024-01-12 PROCEDURE — 84156 ASSAY OF PROTEIN URINE: CPT

## 2024-01-12 PROCEDURE — 87483 CNS DNA AMP PROBE TYPE 12-25: CPT

## 2024-01-12 PROCEDURE — 83935 ASSAY OF URINE OSMOLALITY: CPT

## 2024-01-12 PROCEDURE — 94640 AIRWAY INHALATION TREATMENT: CPT

## 2024-01-12 PROCEDURE — 87449 NOS EACH ORGANISM AG IA: CPT

## 2024-01-12 PROCEDURE — 0042T: CPT

## 2024-01-12 PROCEDURE — 82803 BLOOD GASES ANY COMBINATION: CPT

## 2024-01-12 PROCEDURE — 87205 SMEAR GRAM STAIN: CPT

## 2024-01-12 PROCEDURE — 80162 ASSAY OF DIGOXIN TOTAL: CPT

## 2024-01-12 PROCEDURE — 83540 ASSAY OF IRON: CPT

## 2024-01-12 PROCEDURE — 36600 WITHDRAWAL OF ARTERIAL BLOOD: CPT

## 2024-01-12 PROCEDURE — 85025 COMPLETE CBC W/AUTO DIFF WBC: CPT

## 2024-01-12 PROCEDURE — 97163 PT EVAL HIGH COMPLEX 45 MIN: CPT

## 2024-01-12 PROCEDURE — 86923 COMPATIBILITY TEST ELECTRIC: CPT

## 2024-01-12 PROCEDURE — 84443 ASSAY THYROID STIM HORMONE: CPT

## 2024-01-12 PROCEDURE — 83550 IRON BINDING TEST: CPT

## 2024-01-12 PROCEDURE — 83615 LACTATE (LD) (LDH) ENZYME: CPT

## 2024-01-12 PROCEDURE — 84100 ASSAY OF PHOSPHORUS: CPT

## 2024-01-12 PROCEDURE — 82607 VITAMIN B-12: CPT

## 2024-01-12 PROCEDURE — C1889: CPT

## 2024-01-12 PROCEDURE — 92611 MOTION FLUOROSCOPY/SWALLOW: CPT

## 2024-01-12 PROCEDURE — 94002 VENT MGMT INPAT INIT DAY: CPT

## 2024-01-12 PROCEDURE — P9037: CPT

## 2024-01-12 PROCEDURE — 82746 ASSAY OF FOLIC ACID SERUM: CPT

## 2024-01-12 PROCEDURE — 97167 OT EVAL HIGH COMPLEX 60 MIN: CPT

## 2024-01-12 PROCEDURE — 82947 ASSAY GLUCOSE BLOOD QUANT: CPT

## 2024-01-12 PROCEDURE — 80053 COMPREHEN METABOLIC PANEL: CPT

## 2024-01-12 PROCEDURE — 86788 WEST NILE VIRUS AB IGM: CPT

## 2024-01-12 PROCEDURE — 93970 EXTREMITY STUDY: CPT

## 2024-01-12 PROCEDURE — 80048 BASIC METABOLIC PNL TOTAL CA: CPT

## 2024-01-12 PROCEDURE — 70496 CT ANGIOGRAPHY HEAD: CPT | Mod: MA

## 2024-01-12 PROCEDURE — 86403 PARTICLE AGGLUT ANTBDY SCRN: CPT

## 2024-01-12 PROCEDURE — 84300 ASSAY OF URINE SODIUM: CPT

## 2024-01-12 PROCEDURE — 82553 CREATINE MB FRACTION: CPT

## 2024-01-12 PROCEDURE — 87641 MR-STAPH DNA AMP PROBE: CPT

## 2024-01-12 PROCEDURE — 84466 ASSAY OF TRANSFERRIN: CPT

## 2024-01-12 PROCEDURE — 87385 HISTOPLASMA CAPSUL AG IA: CPT

## 2024-01-12 PROCEDURE — 74230 X-RAY XM SWLNG FUNCJ C+: CPT | Mod: 26

## 2024-01-12 PROCEDURE — 74176 CT ABD & PELVIS W/O CONTRAST: CPT

## 2024-01-12 PROCEDURE — 94003 VENT MGMT INPAT SUBQ DAY: CPT

## 2024-01-12 PROCEDURE — 87150 DNA/RNA AMPLIFIED PROBE: CPT

## 2024-01-12 PROCEDURE — 85018 HEMOGLOBIN: CPT

## 2024-01-12 PROCEDURE — 93308 TTE F-UP OR LMTD: CPT

## 2024-01-12 PROCEDURE — 72170 X-RAY EXAM OF PELVIS: CPT

## 2024-01-12 PROCEDURE — 80307 DRUG TEST PRSMV CHEM ANLYZR: CPT

## 2024-01-12 PROCEDURE — 84295 ASSAY OF SERUM SODIUM: CPT

## 2024-01-12 PROCEDURE — P9040: CPT

## 2024-01-12 PROCEDURE — 87305 ASPERGILLUS AG IA: CPT

## 2024-01-12 PROCEDURE — 85014 HEMATOCRIT: CPT

## 2024-01-12 PROCEDURE — 93931 UPPER EXTREMITY STUDY: CPT

## 2024-01-12 PROCEDURE — 74174 CTA ABD&PLVS W/CONTRAST: CPT

## 2024-01-12 PROCEDURE — 76536 US EXAM OF HEAD AND NECK: CPT

## 2024-01-12 PROCEDURE — 95711 VEEG 2-12 HR UNMONITORED: CPT

## 2024-01-12 PROCEDURE — 82140 ASSAY OF AMMONIA: CPT

## 2024-01-12 PROCEDURE — C9399: CPT

## 2024-01-12 PROCEDURE — 95700 EEG CONT REC W/VID EEG TECH: CPT

## 2024-01-12 PROCEDURE — 80076 HEPATIC FUNCTION PANEL: CPT

## 2024-01-12 PROCEDURE — 80061 LIPID PANEL: CPT

## 2024-01-12 PROCEDURE — 71250 CT THORAX DX C-: CPT

## 2024-01-12 PROCEDURE — C1763: CPT

## 2024-01-12 PROCEDURE — 83036 HEMOGLOBIN GLYCOSYLATED A1C: CPT

## 2024-01-12 PROCEDURE — 74230 X-RAY XM SWLNG FUNCJ C+: CPT

## 2024-01-12 PROCEDURE — 95813 EEG EXTND MNTR 61-119 MIN: CPT

## 2024-01-12 PROCEDURE — 82962 GLUCOSE BLOOD TEST: CPT

## 2024-01-12 PROCEDURE — 92610 EVALUATE SWALLOWING FUNCTION: CPT

## 2024-01-12 PROCEDURE — 82330 ASSAY OF CALCIUM: CPT

## 2024-01-12 PROCEDURE — 87186 SC STD MICRODIL/AGAR DIL: CPT

## 2024-01-12 RX ORDER — LACOSAMIDE 50 MG/1
1 TABLET ORAL
Qty: 0 | Refills: 0 | DISCHARGE
Start: 2024-01-12 | End: 2024-02-07

## 2024-01-12 RX ORDER — DOXAZOSIN MESYLATE 4 MG
1 TABLET ORAL
Qty: 0 | Refills: 0 | DISCHARGE
Start: 2024-01-12

## 2024-01-12 RX ORDER — AMLODIPINE BESYLATE 2.5 MG/1
1 TABLET ORAL
Refills: 0 | DISCHARGE

## 2024-01-12 RX ORDER — FOLIC ACID 0.8 MG
1 TABLET ORAL
Qty: 0 | Refills: 0 | DISCHARGE
Start: 2024-01-12

## 2024-01-12 RX ORDER — METOPROLOL TARTRATE 50 MG
1 TABLET ORAL
Refills: 0 | DISCHARGE

## 2024-01-12 RX ORDER — PANTOPRAZOLE SODIUM 20 MG/1
1 TABLET, DELAYED RELEASE ORAL
Qty: 60 | Refills: 0
Start: 2024-01-12 | End: 2024-02-10

## 2024-01-12 RX ORDER — METOPROLOL TARTRATE 50 MG
1 TABLET ORAL
Qty: 0 | Refills: 0 | DISCHARGE
Start: 2024-01-12

## 2024-01-12 RX ADMIN — Medication 1 MILLIGRAM(S): at 13:07

## 2024-01-12 RX ADMIN — Medication 1 TABLET(S): at 13:07

## 2024-01-12 RX ADMIN — Medication 125 MILLIGRAM(S): at 13:08

## 2024-01-12 RX ADMIN — Medication 125 MILLIGRAM(S): at 18:45

## 2024-01-12 RX ADMIN — Medication 25 MILLIGRAM(S): at 06:20

## 2024-01-12 RX ADMIN — Medication 125 MILLIGRAM(S): at 06:20

## 2024-01-12 RX ADMIN — Medication 25 MILLIGRAM(S): at 18:45

## 2024-01-12 RX ADMIN — Medication 25 MILLIGRAM(S): at 13:07

## 2024-01-12 RX ADMIN — Medication 125 MILLIGRAM(S): at 00:50

## 2024-01-12 RX ADMIN — PANTOPRAZOLE SODIUM 40 MILLIGRAM(S): 20 TABLET, DELAYED RELEASE ORAL at 06:20

## 2024-01-12 RX ADMIN — CHLORHEXIDINE GLUCONATE 1 APPLICATION(S): 213 SOLUTION TOPICAL at 06:20

## 2024-01-12 RX ADMIN — Medication 25 MILLIGRAM(S): at 00:49

## 2024-01-12 RX ADMIN — PANTOPRAZOLE SODIUM 40 MILLIGRAM(S): 20 TABLET, DELAYED RELEASE ORAL at 18:40

## 2024-01-12 NOTE — DISCHARGE NOTE PROVIDER - NSDCMRMEDTOKEN_GEN_ALL_CORE_FT
amLODIPine 2.5 mg oral tablet: 1 tab(s) orally once a day  atorvastatin 80 mg oral tablet: 1 tab(s) orally once a day (at bedtime)  doxazosin 2 mg oral tablet: 1 tab(s) orally once a day (at bedtime)  folic acid 1 mg oral tablet: 1 tab(s) orally once a day  metoprolol succinate 100 mg oral tablet, extended release: 1 tab(s) orally once a day  Namenda 10 mg oral tablet: 1 tab(s) orally 2 times a day   atorvastatin 80 mg oral tablet: 1 tab(s) orally once a day (at bedtime)  doxazosin 2 mg oral tablet: 1 tab(s) orally once a day (at bedtime)  folic acid 1 mg oral tablet: 1 tab(s) orally once a day  lacosamide 50 mg oral tablet: 1 tab(s) orally 2 times a day  metoprolol tartrate 25 mg oral tablet: 1 tab(s) orally every 6 hours  Namenda 10 mg oral tablet: 1 tab(s) orally 2 times a day  Protonix 40 mg oral delayed release tablet: 1 tab(s) orally 2 times a day

## 2024-01-12 NOTE — PROGRESS NOTE ADULT - THIS PATIENT HAS THE FOLLOWING CONDITION(S)/DIAGNOSES ON THIS ADMISSION:
Encephalopathy/Acute Respiratory Failure
None
Cerebral Edema/Brain Compression / Herniation/Acute Respiratory Failure
Encephalopathy/Brain Compression / Herniation/Acute Respiratory Failure
Encephalopathy/Brain Compression / Herniation/Acute Respiratory Failure
Encephalopathy/Cerebral Edema/Brain Compression / Herniation/Acute Respiratory Failure
None
Sepsis/Encephalopathy/Cerebral Edema/Brain Compression / Herniation/Acute Respiratory Failure
Brain Compression / Herniation
Cerebral Edema/Brain Compression / Herniation
None
Sepsis/Cerebral Edema/Brain Compression / Herniation/Acute Respiratory Failure
Sepsis/Cerebral Edema/Brain Compression / Herniation/Acute Respiratory Failure
Cerebral Edema
Encephalopathy/Cerebral Edema
Encephalopathy/Cerebral Edema/Brain Compression / Herniation/Acute Respiratory Failure
Encephalopathy/Cerebral Edema/Brain Compression / Herniation/Acute Respiratory Failure
None
Brain Compression / Herniation
Encephalopathy/Cerebral Edema
Encephalopathy/Cerebral Edema/Brain Compression / Herniation
None
Encephalopathy/Acute Respiratory Failure
Encephalopathy/Cerebral Edema
Encephalopathy/Cerebral Edema/Brain Compression / Herniation/Acute Respiratory Failure
None
Brain Compression / Herniation
Cerebral Edema
None
Encephalopathy
Encephalopathy/Cerebral Edema/Brain Compression / Herniation/Acute Respiratory Failure
None
Encephalopathy/Cerebral Edema

## 2024-01-12 NOTE — DISCHARGE NOTE PROVIDER - ATTENDING DISCHARGE PHYSICAL EXAMINATION:
Vital Signs Last 24 Hrs  T(C): 37 (12 Jan 2024 08:19), Max: 37.1 (12 Jan 2024 05:04)  T(F): 98.6 (12 Jan 2024 08:19), Max: 98.7 (12 Jan 2024 05:04)  HR: 107 (12 Jan 2024 08:19) (98 - 107)  BP: 112/67 (12 Jan 2024 08:19) (112/67 - 149/74)  BP(mean): --  RR: 17 (12 Jan 2024 08:19) (17 - 18)  SpO2: 93% (12 Jan 2024 08:19) (93% - 97%)    Parameters below as of 12 Jan 2024 08:19  Patient On (Oxygen Delivery Method): room air    CONSTITUTIONAL: NAD, A&O x2, awake alert  ENMT: Healed surgical scar on right side  RESPIRATORY: Normal respiratory effort; lungs are clear to auscultation bilaterally  CARDIOVASCULAR: Regular rate and rhythm, normal S1 and S2, no murmur/rub/gallop.  ABDOMEN: Nontender to palpation, no rebound/guarding; No hepatosplenomegaly  MUSCLOSKELETAL:  No edema  NEUROLOGY: Awake, oriented x 2. Left sided hemiplegia   SKIN : stage 2 sacral decub

## 2024-01-12 NOTE — SWALLOW VFSS/MBS ASSESSMENT ADULT - COMMENTS
As per MD note: "83M w/ PMHX of HTN, CAD s/p PCI, renal cell ca s/p L nephrectomy, bladder CA, BPH, CHF, LBBB, vertigo,  HLD, 5.5cm AAA s/p post EVAR, paroxysmal afib on Eliquis, Plavix, ASA, h/o HIT, early stage Alzheimer's dementia, trf from [place] 11/10/23 s/p fall found with multicompartmental ICH, s/p R craniectomy 11/10 and eventual cranioplasty 12/7/23. Hospital course complicated by afib with RVR, Klebsiella pneumonia, LUE DVT, multiple subsequent episodes of ICH after attempting to restart Eliquis, now with acute respiratory failure, aspiration pneumonia, shock, hematemesis." As per MD note: "83M w/ PMHX of HTN, CAD s/p PCI, renal cell ca s/p L nephrectomy, bladder CA, BPH, CHF, LBBB, vertigo,  HLD, 5.5cm AAA s/p post EVAR, paroxysmal afib on Eliquis, Plavix, ASA, h/o HIT, early stage Alzheimer's dementia, trf from [place] 11/10/23 s/p fall found with multicompartmental ICH, s/p R craniectomy 11/10 and eventual cranioplasty 12/7/23. Hospital course complicated by afib with RVR, Klebsiella pneumonia, LUE DVT, multiple subsequent episodes of ICH after attempting to restart Eliquis, now with acute respiratory failure, aspiration pneumonia, shock, hematemesis."

## 2024-01-12 NOTE — PROGRESS NOTE ADULT - ASSESSMENT
83M w/ PMHX of HTN, CAD s/p PCI, renal cell ca s/p L nephrectomy, bladder CA, BPH, CHF, LBBB, vertigo,  HLD, 5.5cm AAA s/p post EVAR, paroxysmal afib on Eliquis, Plavix, ASA, h/o HIT, early stage Alzheimer's dementia, trf from [place] 11/10/23 s/p fall found with multicompartmental ICH, s/p R craniectomy 11/10 and eventual cranioplasty 12/7/23. Hospital course complicated by afib with RVR, Klebsiella pneumonia, LUE DVT, multiple subsequent episodes of ICH after attempting to restart Eliquis, now with acute respiratory failure, aspiration pneumonia, shock, hematemesis.    # Encephalopathy, multifactorial. ICH, CVA, ICU, possible delirium  Waxing, waning  - Needs frequent reorientation  - Neurology signed off  - Mental status improved after holding Anti-epileptics    # ICH s/p craniotomy and cranioplasty  # Abnormal EEG  - Potential epileptogenic focus in the right anterior head region with underlying cerebral dysfunction and evidence for overlying skull defect  - Family concerned about Antiseizure medications making patient lethargic and confused  - Had long conversation with family, they request to stop antiseizure medication.   - Family understands risks that patient may have a seizure which can lead to status epilepticus or even death.  - Family agreeable to decreasing dose of antiseizure medication.  - Vimpat 50mg BID held    # Aspiration pneumonia  - Aspiration precautions  - Monitor for hypoxia  - s/p Meropenem  - ID follow up noted     # C diff   - C/w vancomycin 125mg Q 6h   - 2 weeks course to be completed on 1/12/2024    # Dysphagia  - Puree with moderately thick liquids  - SLP evaluation noted  - Will get MBS today    # fungemia:  candida galbrata  - Completed Caspofungin course  - ID consult appreciated    # Atrial Fibrillation  - Metoprolol 25mg q6h  - No anticoagulation given ICH    # anemia: likely multifactorial  - S/p PRBC  - Protonix 40mg BID  - No intervention per GI    DVT ppx  - SCD    DISPO: [facility] pending auth 83M w/ PMHX of HTN, CAD s/p PCI, renal cell ca s/p L nephrectomy, bladder CA, BPH, CHF, LBBB, vertigo,  HLD, 5.5cm AAA s/p post EVAR, paroxysmal afib on Eliquis, Plavix, ASA, h/o HIT, early stage Alzheimer's dementia, trf from [place] 11/10/23 s/p fall found with multicompartmental ICH, s/p R craniectomy 11/10 and eventual cranioplasty 12/7/23. Hospital course complicated by afib with RVR, Klebsiella pneumonia, LUE DVT, multiple subsequent episodes of ICH after attempting to restart Eliquis, now with acute respiratory failure, aspiration pneumonia, shock, hematemesis.    # Encephalopathy, multifactorial. ICH, CVA, ICU, possible delirium  Waxing, waning  - Needs frequent reorientation  - Neurology signed off  - Mental status improved after holding Anti-epileptics    # ICH s/p craniotomy and cranioplasty  # Abnormal EEG  - Potential epileptogenic focus in the right anterior head region with underlying cerebral dysfunction and evidence for overlying skull defect  - Family concerned about Antiseizure medications making patient lethargic and confused  - Had long conversation with family, they request to stop antiseizure medication.   - Family understands risks that patient may have a seizure which can lead to status epilepticus or even death.  - Family agreeable to decreasing dose of antiseizure medication.  - Vimpat 50mg BID held    # Aspiration pneumonia  - Aspiration precautions  - Monitor for hypoxia  - s/p Meropenem  - ID follow up noted     # C diff   - C/w vancomycin 125mg Q 6h   - 2 weeks course to be completed on 1/12/2024    # Dysphagia  - Puree with moderately thick liquids  - SLP evaluation noted  - Will get MBS today    # fungemia:  candida galbrata  - Completed Caspofungin course  - ID consult appreciated    # Atrial Fibrillation  - Metoprolol 25mg q6h  - No anticoagulation given ICH    # anemia: likely multifactorial  - S/p PRBC  - Protonix 40mg BID  - No intervention per GI    DVT ppx  - SCD    DISPO: HO[facility] pending auth

## 2024-01-12 NOTE — DISCHARGE NOTE PROVIDER - CARE PROVIDERS DIRECT ADDRESSES
,DirectAddress_Unknown,DirectAddress_Unknown,[email or web address] ,DirectAddress_Unknown,DirectAddress_Unknown,[email or web address]

## 2024-01-12 NOTE — SWALLOW VFSS/MBS ASSESSMENT ADULT - ORAL PHASE
Uncontrolled bolus / spillover in jeff-pharynx anterior chew pattern/Delayed oral transit time/Reduced anterior - posterior transport Uncontrolled bolus / spillover in jeff-pharynx/Uncontrolled bolus / spillover in hypopharynx

## 2024-01-12 NOTE — DISCHARGE NOTE PROVIDER - NSDCFUADDAPPT_GEN_ALL_CORE_FT
[facility]  [address] [place] [address]  [phone] [facility]  [address] [place] [address]  [phone]

## 2024-01-12 NOTE — SWALLOW VFSS/MBS ASSESSMENT ADULT - SLP PERTINENT HISTORY OF CURRENT PROBLEM
Pt known to this dept & has between service  this admission for dysphagia. 12/4 MBS Pt known to this dept & has been seen this admission for dysphagia. 12/4 MBS completed with RX for easy to chew & moderately thick fluids (currently on minced/moist solids, moderately thick fludis). Repeat MBS to objectively re-assess for diet upgrade. Pt known to this dept & has been seen this admission for dysphagia. 12/4 MBS completed with RX for easy to chew & moderately thick fluids (currently on minced/moist solids, moderately thick fluids). Repeat MBS to objectively re-assess for diet upgrade. Pt known to this dept & has been seen this admission for dysphagia & speech tx. 12/4 MBS completed with RX for easy to chew & moderately thick fluids (currently on minced/moist solids, moderately thick fluids). Repeat MBS to objectively re-assess for diet upgrade.

## 2024-01-12 NOTE — PROGRESS NOTE ADULT - ATTENDING SUPERVISION STATEMENT
Fellow
Resident
Fellow
Resident

## 2024-01-12 NOTE — PROGRESS NOTE ADULT - REASON FOR ADMISSION
s/p unwitnessed fall with head strike

## 2024-01-12 NOTE — PROGRESS NOTE ADULT - NUTRITIONAL ASSESSMENT
This patient has been assessed with a concern for Malnutrition and has been determined to have a diagnosis/diagnoses of Severe protein-calorie malnutrition.    This patient is being managed with:   Diet Minced and Moist-  DASH/TLC {Sodium & Cholesterol Restricted} (DASH)  Moderately Thick Liquids (MODTHICKLIQS)  Entered: Dec 15 2023  6:04PM  
This patient has been assessed with a concern for Malnutrition and has been determined to have a diagnosis/diagnoses of Severe protein-calorie malnutrition.    This patient is being managed with:   Diet NPO-  Entered: Dec 21 2023  8:38AM  
This patient has been assessed with a concern for Malnutrition and has been determined to have a diagnosis/diagnoses of Severe protein-calorie malnutrition.    This patient is being managed with:   Diet NPO-  Except Medications  Entered: Nov 17 2023 11:01AM  
This patient has been assessed with a concern for Malnutrition and has been determined to have a diagnosis/diagnoses of Severe protein-calorie malnutrition.    This patient is being managed with:   Diet Pureed-  Entered: Nov 21 2023  3:25PM  
This patient has been assessed with a concern for Malnutrition and has been determined to have a diagnosis/diagnoses of Severe protein-calorie malnutrition.    This patient is being managed with:   Diet Pureed-  Moderately Thick Liquids (MODTHICKLIQS)  Entered: Dec 11 2023  5:07PM  
This patient has been assessed with a concern for Malnutrition and has been determined to have a diagnosis/diagnoses of Severe protein-calorie malnutrition.    This patient is being managed with:   Diet Pureed-  Moderately Thick Liquids (MODTHICKLIQS)  Entered: Dec 11 2023  5:07PM  
This patient has been assessed with a concern for Malnutrition and has been determined to have a diagnosis/diagnoses of Severe protein-calorie malnutrition.    This patient is being managed with:   Diet Pureed-  Moderately Thick Liquids (MODTHICKLIQS)  Entered: Nov 23 2023  9:58AM  
This patient has been assessed with a concern for Malnutrition and has been determined to have a diagnosis/diagnoses of Severe protein-calorie malnutrition.    This patient is being managed with:   Diet Pureed-  No Liquids (NOLIQUIDS)  Entered: Dec 29 2023 10:08AM  
This patient has been assessed with a concern for Malnutrition and has been determined to have a diagnosis/diagnoses of Severe protein-calorie malnutrition.    This patient is being managed with:   Diet Regular-  Soft and Bite Sized (SOFTBTSZ)  Moderately Thick Liquids (MODTHICKLIQS)  Entered: Dec  8 2023  1:19PM  
This patient has been assessed with a concern for Malnutrition and has been determined to have a diagnosis/diagnoses of Severe protein-calorie malnutrition.  
This patient has been assessed with a concern for Malnutrition and has been determined to have a diagnosis/diagnoses of Severe protein-calorie malnutrition.  
This patient has been assessed with a concern for Malnutrition and has been determined to have a diagnosis/diagnoses of Severe protein-calorie malnutrition.    This patient has been assessed with a concern for Malnutrition and has been determined to have a diagnosis/diagnoses of Severe protein-calorie malnutrition.    This patient is being managed with:   Diet NPO-  Entered: Dec 21 2023  8:38AM  
This patient has been assessed with a concern for Malnutrition and has been determined to have a diagnosis/diagnoses of Severe protein-calorie malnutrition.    This patient is being managed with:   Diet NPO after Midnight-     NPO Start Date: 06-Dec-2023   NPO Start Time: 23:59  Entered: Dec  6 2023  6:42AM    Diet Soft and Bite Sized-  Moderately Thick Liquids (MODTHICKLIQS)  Entered: Dec  3 2023  3:40PM  
This patient has been assessed with a concern for Malnutrition and has been determined to have a diagnosis/diagnoses of Severe protein-calorie malnutrition.    This patient is being managed with:   Diet NPO after Midnight-     NPO Start Date: 06-Dec-2023   NPO Start Time: 23:59  Entered: Dec  6 2023  6:42AM    Diet Soft and Bite Sized-  Moderately Thick Liquids (MODTHICKLIQS)  Entered: Dec  3 2023  3:40PM  
This patient has been assessed with a concern for Malnutrition and has been determined to have a diagnosis/diagnoses of Severe protein-calorie malnutrition.    This patient is being managed with:   Diet NPO with Tube Feed-  Tube Feeding Modality: Orogastric  Jevity 1.5 Ben (JEVITY1.5)  Total Volume for 24 Hours (mL): 1200  Continuous  Starting Tube Feed Rate {mL per Hour}: 10  Increase Tube Feed Rate by (mL): 10     Every 4 hours  Until Goal Tube Feed Rate (mL per Hour): 50  Tube Feed Duration (in Hours): 24  Tube Feed Start Time: 09:00  Entered: Dec 24 2023  8:38AM  
This patient has been assessed with a concern for Malnutrition and has been determined to have a diagnosis/diagnoses of Severe protein-calorie malnutrition.    This patient is being managed with:   Diet Pureed-  Moderately Thick Liquids (MODTHICKLIQS)  Entered: Nov 23 2023  9:58AM  
This patient has been assessed with a concern for Malnutrition and has been determined to have a diagnosis/diagnoses of Severe protein-calorie malnutrition.    This patient is being managed with:   Diet Pureed-  No Liquids (NOLIQUIDS)  Entered: Dec 29 2023 10:08AM  
This patient has been assessed with a concern for Malnutrition and has been determined to have a diagnosis/diagnoses of Severe protein-calorie malnutrition.    This patient is being managed with:   Diet Minced and Moist-  DASH/TLC {Sodium & Cholesterol Restricted} (DASH)  Moderately Thick Liquids (MODTHICKLIQS)  Entered: Dec 15 2023  6:04PM  
This patient has been assessed with a concern for Malnutrition and has been determined to have a diagnosis/diagnoses of Severe protein-calorie malnutrition.    This patient is being managed with:   Diet Minced and Moist-  Moderately Thick Liquids (MODTHICKLIQS)  Entered: Jan 5 2024 11:52AM  
This patient has been assessed with a concern for Malnutrition and has been determined to have a diagnosis/diagnoses of Severe protein-calorie malnutrition.    This patient is being managed with:   Diet Minced and Moist-  Moderately Thick Liquids (MODTHICKLIQS)  Entered: Jan 5 2024 11:52AM  
This patient has been assessed with a concern for Malnutrition and has been determined to have a diagnosis/diagnoses of Severe protein-calorie malnutrition.    This patient is being managed with:   Diet NPO with Tube Feed-  Tube Feeding Modality: Nasogastric  Nepro (NEPRORTH)  Total Volume for 24 Hours (mL): 200  Continuous  Starting Tube Feed Rate {mL per Hour}: 10  Until Goal Tube Feed Rate (mL per Hour): 10  Tube Feed Duration (in Hours): 20  Tube Feed Start Time: 11:45  Entered: Nov 19 2023 11:44AM  
This patient has been assessed with a concern for Malnutrition and has been determined to have a diagnosis/diagnoses of Severe protein-calorie malnutrition.    This patient is being managed with:   Diet NPO-  Entered: Dec 21 2023  8:38AM  
This patient has been assessed with a concern for Malnutrition and has been determined to have a diagnosis/diagnoses of Severe protein-calorie malnutrition.    This patient is being managed with:   Diet NPO-  Except Medications  Entered: Nov 17 2023 11:01AM  
This patient has been assessed with a concern for Malnutrition and has been determined to have a diagnosis/diagnoses of Severe protein-calorie malnutrition.    This patient is being managed with:   Diet NPO-  Except Medications  Entered: Nov 17 2023 11:01AM  
This patient has been assessed with a concern for Malnutrition and has been determined to have a diagnosis/diagnoses of Severe protein-calorie malnutrition.    This patient is being managed with:   Diet Pureed-  Moderately Thick Liquids (MODTHICKLIQS)  Entered: Dec 11 2023  5:07PM  
This patient has been assessed with a concern for Malnutrition and has been determined to have a diagnosis/diagnoses of Severe protein-calorie malnutrition.    This patient is being managed with:   Diet Pureed-  Moderately Thick Liquids (MODTHICKLIQS)  Supplement Feeding Modality:  Oral  Ensure Enlive Cans or Servings Per Day:  2       Frequency:  Three Times a day  Entered: Nov 25 2023 10:32AM  
This patient has been assessed with a concern for Malnutrition and has been determined to have a diagnosis/diagnoses of Severe protein-calorie malnutrition.    This patient is being managed with:   Diet NPO with Tube Feed-  Tube Feeding Modality: Nasogastric  Nepro (NEPRORTH)  Total Volume for 24 Hours (mL): 1200  Continuous  Starting Tube Feed Rate {mL per Hour}: 10  Increase Tube Feed Rate by (mL): 10     Every 6 hours  Until Goal Tube Feed Rate (mL per Hour): 50  Tube Feed Duration (in Hours): 24  Tube Feed Start Time: 10:54  Entered: Nov 22 2023 10:55AM  
This patient has been assessed with a concern for Malnutrition and has been determined to have a diagnosis/diagnoses of Severe protein-calorie malnutrition.    This patient is being managed with:   Diet NPO-  Except Medications  Entered: Nov 17 2023 11:01AM  
This patient has been assessed with a concern for Malnutrition and has been determined to have a diagnosis/diagnoses of Severe protein-calorie malnutrition.    This patient is being managed with:   Diet Pureed-  Entered: Nov 21 2023  3:25PM  
This patient has been assessed with a concern for Malnutrition and has been determined to have a diagnosis/diagnoses of Severe protein-calorie malnutrition.    This patient is being managed with:   Diet Pureed-  Moderately Thick Liquids (MODTHICKLIQS)  Supplement Feeding Modality:  Oral  Ensure Enlive Cans or Servings Per Day:  2       Frequency:  Three Times a day  Entered: Nov 25 2023 10:32AM  
This patient has been assessed with a concern for Malnutrition and has been determined to have a diagnosis/diagnoses of Severe protein-calorie malnutrition.    This patient is being managed with:   Diet Pureed-  Moderately Thick Liquids (MODTHICKLIQS)  Supplement Feeding Modality:  Oral  Ensure Enlive Cans or Servings Per Day:  2       Frequency:  Three Times a day  Entered: Nov 25 2023 10:32AM  
This patient has been assessed with a concern for Malnutrition and has been determined to have a diagnosis/diagnoses of Severe protein-calorie malnutrition.    This patient is being managed with:   Diet Pureed-  No Liquids (NOLIQUIDS)  Entered: Dec 29 2023 10:08AM  
This patient has been assessed with a concern for Malnutrition and has been determined to have a diagnosis/diagnoses of Severe protein-calorie malnutrition.    This patient is being managed with:   Diet Minced and Moist-  Moderately Thick Liquids (MODTHICKLIQS)  Entered: Jan 5 2024 11:52AM  
This patient has been assessed with a concern for Malnutrition and has been determined to have a diagnosis/diagnoses of Severe protein-calorie malnutrition.    This patient is being managed with:   Diet NPO with Tube Feed-  Tube Feeding Modality: Nasogastric  Nepro (NEPRORTH)  Total Volume for 24 Hours (mL): 200  Continuous  Starting Tube Feed Rate {mL per Hour}: 10  Until Goal Tube Feed Rate (mL per Hour): 10  Tube Feed Duration (in Hours): 20  Tube Feed Start Time: 11:45  Entered: Nov 19 2023 11:44AM  
This patient has been assessed with a concern for Malnutrition and has been determined to have a diagnosis/diagnoses of Severe protein-calorie malnutrition.    This patient is being managed with:   Diet NPO with Tube Feed-  Tube Feeding Modality: Nasogastric  Nepro (NEPRORTH)  Total Volume for 24 Hours (mL): 200  Continuous  Starting Tube Feed Rate {mL per Hour}: 10  Until Goal Tube Feed Rate (mL per Hour): 10  Tube Feed Duration (in Hours): 20  Tube Feed Start Time: 11:45  Entered: Nov 19 2023 11:44AM  
This patient has been assessed with a concern for Malnutrition and has been determined to have a diagnosis/diagnoses of Severe protein-calorie malnutrition.    This patient is being managed with:   Diet NPO-  Entered: Dec 21 2023  8:38AM  
This patient has been assessed with a concern for Malnutrition and has been determined to have a diagnosis/diagnoses of Severe protein-calorie malnutrition.    This patient is being managed with:   Diet Pureed-  Moderately Thick Liquids (MODTHICKLIQS)  Entered: Jan 5 2024 10:07AM  
This patient has been assessed with a concern for Malnutrition and has been determined to have a diagnosis/diagnoses of Severe protein-calorie malnutrition.    This patient is being managed with:   Diet Pureed-  Moderately Thick Liquids (MODTHICKLIQS)  Supplement Feeding Modality:  Oral  Ensure Enlive Cans or Servings Per Day:  2       Frequency:  Three Times a day  Entered: Nov 25 2023 10:32AM  
This patient has been assessed with a concern for Malnutrition and has been determined to have a diagnosis/diagnoses of Severe protein-calorie malnutrition.    This patient is being managed with:   Diet Soft and Bite Sized-  Moderately Thick Liquids (MODTHICKLIQS)  Entered: Dec  3 2023  3:40PM  
This patient has been assessed with a concern for Malnutrition and has been determined to have a diagnosis/diagnoses of Severe protein-calorie malnutrition.    This patient is being managed with:   Diet Minced and Moist-  DASH/TLC {Sodium & Cholesterol Restricted} (DASH)  Moderately Thick Liquids (MODTHICKLIQS)  Entered: Dec 15 2023  6:04PM  
This patient has been assessed with a concern for Malnutrition and has been determined to have a diagnosis/diagnoses of Severe protein-calorie malnutrition.    This patient is being managed with:   Diet Minced and Moist-  DASH/TLC {Sodium & Cholesterol Restricted} (DASH)  Moderately Thick Liquids (MODTHICKLIQS)  Entered: Dec 15 2023  6:04PM  
This patient has been assessed with a concern for Malnutrition and has been determined to have a diagnosis/diagnoses of Severe protein-calorie malnutrition.    This patient is being managed with:   Diet NPO with Tube Feed-  Tube Feeding Modality: Nasogastric  Nepro (NEPRORTH)  Total Volume for 24 Hours (mL): 1200  Continuous  Starting Tube Feed Rate {mL per Hour}: 10  Increase Tube Feed Rate by (mL): 10     Every 6 hours  Until Goal Tube Feed Rate (mL per Hour): 50  Tube Feed Duration (in Hours): 24  Tube Feed Start Time: 10:54  Entered: Nov 22 2023 10:55AM  
This patient has been assessed with a concern for Malnutrition and has been determined to have a diagnosis/diagnoses of Severe protein-calorie malnutrition.    This patient is being managed with:   Diet NPO-  Except Medications  Entered: Dec 10 2023  9:59PM  
This patient has been assessed with a concern for Malnutrition and has been determined to have a diagnosis/diagnoses of Severe protein-calorie malnutrition.    This patient is being managed with:   Diet Pureed-  Moderately Thick Liquids (MODTHICKLIQS)  Entered: Nov 23 2023  9:58AM  
This patient has been assessed with a concern for Malnutrition and has been determined to have a diagnosis/diagnoses of Severe protein-calorie malnutrition.    This patient is being managed with:   Diet Soft and Bite Sized-  Moderately Thick Liquids (MODTHICKLIQS)  Entered: Dec  3 2023  3:40PM  
This patient has been assessed with a concern for Malnutrition and has been determined to have a diagnosis/diagnoses of Severe protein-calorie malnutrition.  
This patient has been assessed with a concern for Malnutrition and has been determined to have a diagnosis/diagnoses of Severe protein-calorie malnutrition.    This patient is being managed with:   Diet Minced and Moist-  DASH/TLC {Sodium & Cholesterol Restricted} (DASH)  Moderately Thick Liquids (MODTHICKLIQS)  Entered: Dec 15 2023  6:04PM  
This patient has been assessed with a concern for Malnutrition and has been determined to have a diagnosis/diagnoses of Severe protein-calorie malnutrition.    This patient is being managed with:   Diet Minced and Moist-  DASH/TLC {Sodium & Cholesterol Restricted} (DASH)  Moderately Thick Liquids (MODTHICKLIQS)  Entered: Dec 15 2023  6:04PM  
This patient has been assessed with a concern for Malnutrition and has been determined to have a diagnosis/diagnoses of Severe protein-calorie malnutrition.    This patient is being managed with:   Diet Minced and Moist-  Moderately Thick Liquids (MODTHICKLIQS)  Entered: Jan 5 2024 11:52AM  
This patient has been assessed with a concern for Malnutrition and has been determined to have a diagnosis/diagnoses of Severe protein-calorie malnutrition.    This patient is being managed with:   Diet NPO after Midnight-     NPO Start Date: 06-Dec-2023   NPO Start Time: 23:59  Entered: Dec  6 2023  6:42AM    Diet Soft and Bite Sized-  Moderately Thick Liquids (MODTHICKLIQS)  Entered: Dec  3 2023  3:40PM  
This patient has been assessed with a concern for Malnutrition and has been determined to have a diagnosis/diagnoses of Severe protein-calorie malnutrition.    This patient is being managed with:   Diet Pureed-  Moderately Thick Liquids (MODTHICKLIQS)  Supplement Feeding Modality:  Oral  Ensure Enlive Cans or Servings Per Day:  2       Frequency:  Three Times a day  Entered: Nov 25 2023 10:32AM  
This patient has been assessed with a concern for Malnutrition and has been determined to have a diagnosis/diagnoses of Severe protein-calorie malnutrition.    This patient is being managed with:   Diet Pureed-  No Liquids (NOLIQUIDS)  Entered: Dec 29 2023 10:08AM  
This patient has been assessed with a concern for Malnutrition and has been determined to have a diagnosis/diagnoses of Severe protein-calorie malnutrition.    This patient is being managed with:   Diet NPO-  Except Medications  Entered: Nov 17 2023 11:01AM  
This patient has been assessed with a concern for Malnutrition and has been determined to have a diagnosis/diagnoses of Severe protein-calorie malnutrition.    This patient is being managed with:   Diet Pureed-  Moderately Thick Liquids (MODTHICKLIQS)  Supplement Feeding Modality:  Oral  Ensure Enlive Cans or Servings Per Day:  2       Frequency:  Three Times a day  Entered: Nov 25 2023 10:32AM  
This patient has been assessed with a concern for Malnutrition and has been determined to have a diagnosis/diagnoses of Severe protein-calorie malnutrition.    This patient is being managed with:   Diet Pureed-  No Liquids (NOLIQUIDS)  Entered: Dec 29 2023 10:08AM  
This patient has been assessed with a concern for Malnutrition and has been determined to have a diagnosis/diagnoses of Severe protein-calorie malnutrition.  
This patient has been assessed with a concern for Malnutrition and has been determined to have a diagnosis/diagnoses of Severe protein-calorie malnutrition.    This patient is being managed with:   Diet Minced and Moist-  Moderately Thick Liquids (MODTHICKLIQS)  Entered: Jan 5 2024 11:52AM  
This patient has been assessed with a concern for Malnutrition and has been determined to have a diagnosis/diagnoses of Severe protein-calorie malnutrition.    This patient is being managed with:   Diet Minced and Moist-  Moderately Thick Liquids (MODTHICKLIQS)  Entered: Jan 5 2024 11:52AM  
This patient has been assessed with a concern for Malnutrition and has been determined to have a diagnosis/diagnoses of Severe protein-calorie malnutrition.    This patient is being managed with:   Diet NPO-  Except Medications  Entered: Dec 10 2023  9:59PM  
This patient has been assessed with a concern for Malnutrition and has been determined to have a diagnosis/diagnoses of Severe protein-calorie malnutrition.    This patient is being managed with:   Diet Pureed-  Moderately Thick Liquids (MODTHICKLIQS)  Entered: Dec 11 2023  5:07PM  
This patient has been assessed with a concern for Malnutrition and has been determined to have a diagnosis/diagnoses of Severe protein-calorie malnutrition.    This patient is being managed with:   Diet Pureed-  Moderately Thick Liquids (MODTHICKLIQS)  Supplement Feeding Modality:  Oral  Ensure Enlive Cans or Servings Per Day:  2       Frequency:  Three Times a day  Entered: Nov 25 2023 10:32AM  
This patient has been assessed with a concern for Malnutrition and has been determined to have a diagnosis/diagnoses of Severe protein-calorie malnutrition.    This patient is being managed with:   Diet NPO with Tube Feed-  Tube Feeding Modality: Nasogastric  Nepro (NEPRORTH)  Total Volume for 24 Hours (mL): 200  Continuous  Starting Tube Feed Rate {mL per Hour}: 10  Until Goal Tube Feed Rate (mL per Hour): 10  Tube Feed Duration (in Hours): 20  Tube Feed Start Time: 11:45  Entered: Nov 19 2023 11:44AM  
This patient has been assessed with a concern for Malnutrition and has been determined to have a diagnosis/diagnoses of Severe protein-calorie malnutrition.    This patient is being managed with:   Diet Minced and Moist-  DASH/TLC {Sodium & Cholesterol Restricted} (DASH)  Moderately Thick Liquids (MODTHICKLIQS)  Entered: Dec 15 2023  6:04PM  
This patient has been assessed with a concern for Malnutrition and has been determined to have a diagnosis/diagnoses of Severe protein-calorie malnutrition.    This patient is being managed with:   Diet Minced and Moist-  Moderately Thick Liquids (MODTHICKLIQS)  Entered: Jan 5 2024 11:52AM  
This patient has been assessed with a concern for Malnutrition and has been determined to have a diagnosis/diagnoses of Severe protein-calorie malnutrition.    This patient is being managed with:   Diet Pureed-  No Liquids (NOLIQUIDS)  Entered: Dec 29 2023 10:08AM

## 2024-01-12 NOTE — PROGRESS NOTE ADULT - ATTENDING COMMENTS
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 Completed course of PO Vancomycin today. MBS today, recommending minced and moist with mildly thick liquids. DC to [facility].

## 2024-01-12 NOTE — CHART NOTE - NSCHARTNOTEFT_GEN_A_CORE
Please be advised that Jamari Fuchs was hospitalized at [facility] from November 10, 2023 through January 12, 2024. Please extend your courtesies to the patient at this time. Please be advised that Jamari Fuchs was hospitalized at [facility] from November 10, 2023 through January 12, 2024. Please extend your courtesies to the patient at this time.

## 2024-01-12 NOTE — DISCHARGE NOTE PROVIDER - CARE PROVIDER_API CALL
Marty Larios  Neurosurgery  [address] [place] [id number]  Phone: [phone]  Fax: [phone]  Follow Up Time:     PCP,   Phone: (   )    -  Fax: (   )    -  Follow Up Time: 1 week    Jaren Moreno  Neurology  [address] [facility] [facility] [address]  [place] [address]  Phone: [phone]  Fax: [phone]  Follow Up Time:    Marty Larios  Neurosurgery  [address] [place] [id number]  Phone: [phone]  Fax: [phone]  Follow Up Time:     PCP,   Phone: (   )    -  Fax: (   )    -  Follow Up Time: 1 week    Jaren Moreno  Neurology  [address] [facility], [facility] [address]  [place] [address]  Phone: [phone]  Fax: [phone]  Follow Up Time:

## 2024-01-12 NOTE — SWALLOW VFSS/MBS ASSESSMENT ADULT - DIAGNOSTIC IMPRESSIONS
Mild to moderate oral dysphagia for assessed PO trials with insufficient mastication w/moderately delayed A-P transfer of soft/bite-sized & easy to chew solids. Reduced oral containment from lingual weakness & reduced ROM, w/subsequent premature pharyngeal entry of trials noted variably between the jeff & hypopharynx. Mild pharyngeal dysphagia for puree, soft/bite-sized, moderate & mildly thick fluids & mild to moderate pharyngeal dysphagia for thin fluids. Reduced tongue base retraction & hypopharyngeal contractility noted with trace to mild stasis in valleculae (for puree, soft/bite-sized solids & moderately thick fluids) & trace stasis along posterior pharyngeal wall for puree, & moderately thick fluids. Reduced laryngeal elevation & airway closure noted for mildly thick & thin fluids with 2 episodes of laryngeal penetration, above level of vocal cords for mildly thick fluids &  NO aspiration viewed.  Mild to moderate pharyngeal dysphagia for thin fludis with Mild to moderate oral dysphagia for assessed PO trials with insufficient mastication w/moderately delayed A-P transfer of soft/bite-sized & easy to chew solids. Reduced oral containment from lingual weakness & reduced ROM, w/subsequent premature pharyngeal entry of trials noted variably between the jeff & hypopharynx. Mild pharyngeal dysphagia for puree, soft/bite-sized, moderate & mildly thick fluids & mild to moderate pharyngeal dysphagia for thin fluids. Reduced tongue base retraction & hypopharyngeal contractility noted with trace to mild stasis in valleculae (for puree, soft/bite-sized solids & moderately thick fluids) & trace stasis along posterior pharyngeal wall for puree, & moderately thick fluids. Reduced laryngeal elevation & airway closure noted for mildly thick & thin fluids with 2 episodes of laryngeal penetration, above level of vocal cords for mildly thick & thin fluids, & one episode of penetration contacting vocal fold with thin fluids.  NO aspiration viewed.

## 2024-01-12 NOTE — DISCHARGE NOTE PROVIDER - PROVIDER TOKENS
PROVIDER:[TOKEN:[[id number]]],FREE:[LAST:[PCP],PHONE:[(   )    -],FAX:[(   )    -],FOLLOWUP:[1 week]],PROVIDER:[TOKEN:[[id number]]] PROVIDER:[TOKEN:[[id number]]],FREE:[LAST:[PCP],PHONE:[(   )    -],FAX:[(   )    -],FOLLOWUP:[1 week]],PROVIDER:[TOKEN:[[id number]]]

## 2024-01-12 NOTE — SWALLOW VFSS/MBS ASSESSMENT ADULT - SLP GENERAL OBSERVATIONS
Pt received & seen seated upright via stretcher, initially lethargic appearing, however, improved level of arousal with verbal cues, aphasic, 0/10 pain pre/post
Recd awake/upright in stretcher in radiology, A&A Ox2, reduced cognition, 0/10 pain pre/post, tolerating RA NAD, accompanied by RN Pato on monitor

## 2024-01-12 NOTE — PROGRESS NOTE ADULT - PROVIDER SPECIALTY LIST ADULT
Brain Injury Medicine
Brain Injury Medicine
Cardiology
Gastroenterology
Hospitalist
Infectious Disease
Internal Medicine
Internal Medicine
MICU
NSICU
Nephrology
Neurology
Neuropsychology
Neuropsychology
Neurosurgery
Brain Injury Medicine
Critical Care
Gastroenterology
Hospitalist
Infectious Disease
Infectious Disease
Internal Medicine
Internal Medicine
NSICU
Neurosurgery
Brain Injury Medicine
Cardiology
Hospitalist
Infectious Disease
Internal Medicine
Internal Medicine
Neurosurgery
Vascular Surgery
Gastroenterology
Hospitalist
Hospitalist
Infectious Disease
Internal Medicine
NSICU
Nephrology
Neurology
Neurology
Neurosurgery
Brain Injury Medicine
Hospitalist
NSICU
Neurology
Neurology
Neurosurgery
NSICU
Neurosurgery
Critical Care
Hospitalist
Infectious Disease
Internal Medicine
Neurology
Neurosurgery
Cardiology
Cardiology
Hospitalist
Internal Medicine
NSICU
NSICU
Critical Care
Hospitalist
Hospitalist
Infectious Disease
Internal Medicine
MICU
NSICU
NSICU
Neurosurgery
Neurosurgery
NSICU
Neurosurgery
Cardiology
Hospitalist

## 2024-01-12 NOTE — DISCHARGE NOTE PROVIDER - HOSPITAL COURSE
82y M with PMH HTN, CAD s/p stents, renal cell carcinoma status post left nephrectomy, bladder CA, BPH, CHF, LBBB, vertigo,  HLD, 5.5cm AAA without rupture post EVAR, paroxysmal A-fib on Eliquis, Plavix, and aspirin, early stage Alzheimer dementia transferred from [facility] s/p unwitnessed fall with head strike at home found by wife on floor at 8am. Patient brought to urgent care by wife and had 5 staples to posterior scalp but was instructed to go to nearest ED.  CT head at [place] showed R frontal IPH, SDH, SAH at 9:00. CTA stroke protocol not performed at [facility]. Patient BIB on 6L NC.  Pt GCS 15 on arrival, A&Ox2 on arrival. After initial assessment, patient noted to be vomiting enroute to CT scanner.   admitted 11/10 with prolonged hospitalization here.  being found to have right frontal IPH, 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  Extubated 11/13. Course complicated by Acute Kidney Injury, afib, EEG with epileptiform discharges. Patient was trasnferred to step down unit 11/23. TTE had shown possible vegetation on valve but repeat was not diagnostic and blood cultures were negative. Course further complicated by deep vein thrombosis or LUE brachial veins. Patient course further complicated by stroke found on MRI, WIHT recommended but family declined. He subsequently underwent cranioplasty on 12/7, drain removed 12/10. He had worsening of brain bleed 12/15, taken off anticoagulation and reversed. Patient then had acute decompensation on 12/21 for worsening mental status, vomiting, aspiration, and shock, requiring transfer to Intensive care unit for intubation, and pressors. Further complications including acute blood loss anemia requiring PRBCs, klebsiella pneumonia afib with RVR. He is now s/p extubation 12/25. Hospital course was further complicated by C. diff and Fungemia with Candida. Patient received 2 week courses of Vancomycin and Caspofungin. Patient also has had his antiseizure medications held due to family concern of lethargy. Family was explained the risks of holding antiepileptics and family understood the complications and still wished to hold the medications.   Patient is now hemodynamically stable for discharge to [facility]. Family at bedside agreeable with plan.   82y M with PMH HTN, CAD s/p stents, renal cell carcinoma status post left nephrectomy, bladder CA, BPH, CHF, LBBB, vertigo,  HLD, 5.5cm AAA without rupture post EVAR, paroxysmal A-fib on Eliquis, Plavix, and aspirin, early stage Alzheimer dementia transferred from [facility] s/p unwitnessed fall with head strike at home found by wife on floor at 8am. Patient brought to urgent care by wife and had 5 staples to posterior scalp but was instructed to go to nearest ED.  CT head at [place] showed R frontal IPH, SDH, SAH at 9:00. CTA stroke protocol not performed at [facility]. Patient BIB on 6L NC.  Pt GCS 15 on arrival, A&Ox2 on arrival. After initial assessment, patient noted to be vomiting enroute to CT scanner.   admitted 11/10 with prolonged hospitalization here.  being found to have right frontal IPH, 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  Extubated 11/13. Course complicated by Acute Kidney Injury, afib, EEG with epileptiform discharges. Patient was trasnferred to step down unit 11/23. TTE had shown possible vegetation on valve but repeat was not diagnostic and blood cultures were negative. Course further complicated by deep vein thrombosis or LUE brachial veins. Patient course further complicated by stroke found on MRI, WHIT recommended but family declined. He subsequently underwent cranioplasty on 12/7, drain removed 12/10. He had worsening of brain bleed 12/15, taken off anticoagulation and reversed. Patient then had acute decompensation on 12/21 for worsening mental status, vomiting, aspiration, and shock, requiring transfer to Intensive care unit for intubation, and pressors. Further complications including acute blood loss anemia requiring PRBCs, klebsiella pneumonia afib with RVR. He is now s/p extubation 12/25. Hospital course was further complicated by C. diff and Fungemia with Candida. Patient received 2 week courses of Vancomycin and Caspofungin. Patient also has had his antiseizure medications held due to family concern of lethargy. Family was explained the risks of holding antiepileptics and family understood the complications and still wished to hold the medications.   Patient is now hemodynamically stable for discharge to [facility]. Family at bedside agreeable with plan.

## 2024-01-12 NOTE — DISCHARGE NOTE PROVIDER - NSDCCPTREATMENT_GEN_ALL_CORE_FT
PRINCIPAL PROCEDURE  Procedure: Decompressive craniectomy  Findings and Treatment:       SECONDARY PROCEDURE  Procedure: Right cranioplasty with replacement bone flap  Findings and Treatment:

## 2024-01-12 NOTE — SWALLOW VFSS/MBS ASSESSMENT ADULT - RECOMMENDED FEEDING/EATING TECHNIQUES
allow for swallow between intakes/crush medication (when feasible)/maintain upright posture during/after eating for 30 mins/no straws/oral hygiene/position upright (90 degrees)/provide rest periods between swallows/small sips/bites
allow for swallow between intakes/alternate food with liquid/maintain upright posture during/after eating for 30 mins/oral hygiene/position upright (90 degrees)/provide rest periods between swallows/small sips/bites

## 2024-01-12 NOTE — PROGRESS NOTE ADULT - SUBJECTIVE AND OBJECTIVE BOX
NSx PA Note    HPI  82y M with PMH HTN, CAD s/p stents, renal cell carcinoma status post left nephrectomy, bladder CA, BPH, CHF, LBBB, vertigo,  HLD, 5.5cm AAA without rupture post EVAR, paroxysmal A-fib on Eliquis, Plavix, and aspirin, early stage Alzheimer dementia transferred from [facility] s/p unwitnessed fall with head strike at home found by wife on floor at 8am. Patient brought to urgent care by wife and had 5 staples to posterior scalp but was instructed to go to nearest ED.  CT head at [place] showed R frontal IPH, SDH, SAH at 9:00. CTA stroke protocol not performed at [facility]. Patient BIB on 6L NC.  Pt GCS 15 on arrival, A&Ox2 on arrival. After initial assessment, patient noted to be vomiting enroute to CT scanner.     Interval/Overnight Events  Patient seen today bedside. Motioning that he feels "so-so." Patient non-verbally indicating that he seems to have right leg pain. Otherwise no new concerns or problems.     MEDICATIONS  (STANDING):  atorvastatin 80 milliGRAM(s) Oral at bedtime  chlorhexidine 2% Cloths 1 Application(s) Topical <User Schedule>  doxazosin 2 milliGRAM(s) Oral at bedtime  folic acid 1 milliGRAM(s) Oral daily  lacosamide 50 milliGRAM(s) Oral two times a day  metoprolol tartrate 25 milliGRAM(s) Oral every 6 hours  Nephro-roma 1 Tablet(s) Oral daily  pantoprazole  Injectable 40 milliGRAM(s) IV Push two times a day  vancomycin    Solution 125 milliGRAM(s) Oral every 6 hours    MEDICATIONS  (PRN):  acetaminophen     Tablet .. 650 milliGRAM(s) Oral every 6 hours PRN Temp greater or equal to 38C (100.4F), Mild Pain (1 - 3)  artificial tears (preservative free) Ophthalmic Solution 1 Drop(s) Both EYES every 6 hours PRN Dry Eyes    Vital Signs Last 24 Hrs  T(C): 37 (12 Jan 2024 08:19), Max: 37.1 (12 Jan 2024 05:04)  T(F): 98.6 (12 Jan 2024 08:19), Max: 98.7 (12 Jan 2024 05:04)  HR: 107 (12 Jan 2024 08:19) (93 - 114)  BP: 112/67 (12 Jan 2024 08:19) (112/67 - 149/74)  BP(mean): --  RR: 17 (12 Jan 2024 08:19) (17 - 18)  SpO2: 93% (12 Jan 2024 08:19) (93% - 97%)    Parameters below as of 12 Jan 2024 08:19  Patient On (Oxygen Delivery Method): room air    Neuro- Awake, alert, oriented to self and month (with help)  speech minimal, hypophonic, follows simple commands  pupils equal  face symmetric, tongue ML  RUE AG/spontaneous  RLE wiggles toes, pain with passive movement   L dense HP  no calf pain   Incision- well healed     01-12    138  |  103  |  20.7<H>  ----------------------------<  110<H>  3.5   |  21.0<L>  |  0.78    Ca    9.4      12 Jan 2024 06:07  Mg     1.7     01-12    TPro  6.5<L>  /  Alb  2.8<L>  /  TBili  0.3<L>  /  DBili  x   /  AST  13  /  ALT  12  /  AlkPhos  101  01-12                          9.9    9.57  )-----------( 231      ( 12 Jan 2024 06:07 )             29.5     Plan:  NS stable, no plans for NS intervention  Consider isometric exercises to RLE- ?pain from disuse?   q4 neuro checks   pain control as needed  abx for Cdiff per ID  AEDs per neurology and family wishes  Continue holding Eliquis- unsure when and if patient would be a safe candidate to resume with multiple episodes of new ICH after attempting to resume. Risk benefit alternative analysis per primary team  SCds DVT ppx  Management per primary team  Discussed with Dr. Larios.  NSx PA Note    HPI  82y M with PMH HTN, CAD s/p stents, renal cell carcinoma status post left nephrectomy, bladder CA, BPH, CHF, LBBB, vertigo,  HLD, 5.5cm AAA without rupture post EVAR, paroxysmal A-fib on Eliquis, Plavix, and aspirin, early stage Alzheimer dementia transferred from [facility] s/p unwitnessed fall with head strike at home found by wife on floor at 8am. Patient brought to urgent care by wife and had 5 staples to posterior scalp but was instructed to go to nearest ED.  CT head at [place] showed R frontal IPH, SDH, SAH at 9:00. CTA stroke protocol not performed at [facility]. Patient BIB on 6L NC.  Pt GCS 15 on arrival, A&Ox2 on arrival. After initial assessment, patient noted to be vomiting enroute to CT scanner.     Interval/Overnight Events  Patient seen today bedside. Motioning that he feels "so-so." Patient non-verbally indicating that he seems to have right leg pain. Otherwise no new concerns or problems.     MEDICATIONS  (STANDING):  atorvastatin 80 milliGRAM(s) Oral at bedtime  chlorhexidine 2% Cloths 1 Application(s) Topical <User Schedule>  doxazosin 2 milliGRAM(s) Oral at bedtime  folic acid 1 milliGRAM(s) Oral daily  lacosamide 50 milliGRAM(s) Oral two times a day  metoprolol tartrate 25 milliGRAM(s) Oral every 6 hours  Nephro-roma 1 Tablet(s) Oral daily  pantoprazole  Injectable 40 milliGRAM(s) IV Push two times a day  vancomycin    Solution 125 milliGRAM(s) Oral every 6 hours    MEDICATIONS  (PRN):  acetaminophen     Tablet .. 650 milliGRAM(s) Oral every 6 hours PRN Temp greater or equal to 38C (100.4F), Mild Pain (1 - 3)  artificial tears (preservative free) Ophthalmic Solution 1 Drop(s) Both EYES every 6 hours PRN Dry Eyes    Vital Signs Last 24 Hrs  T(C): 37 (12 Jan 2024 08:19), Max: 37.1 (12 Jan 2024 05:04)  T(F): 98.6 (12 Jan 2024 08:19), Max: 98.7 (12 Jan 2024 05:04)  HR: 107 (12 Jan 2024 08:19) (93 - 114)  BP: 112/67 (12 Jan 2024 08:19) (112/67 - 149/74)  BP(mean): --  RR: 17 (12 Jan 2024 08:19) (17 - 18)  SpO2: 93% (12 Jan 2024 08:19) (93% - 97%)    Parameters below as of 12 Jan 2024 08:19  Patient On (Oxygen Delivery Method): room air    Neuro- Awake, alert, oriented to self and month (with help)  speech minimal, hypophonic, follows simple commands  pupils equal  face symmetric, tongue ML  RUE AG/spontaneous  RLE wiggles toes, pain with passive movement   L dense HP  no calf pain   Incision- well healed     01-12    138  |  103  |  20.7<H>  ----------------------------<  110<H>  3.5   |  21.0<L>  |  0.78    Ca    9.4      12 Jan 2024 06:07  Mg     1.7     01-12    TPro  6.5<L>  /  Alb  2.8<L>  /  TBili  0.3<L>  /  DBili  x   /  AST  13  /  ALT  12  /  AlkPhos  101  01-12                          9.9    9.57  )-----------( 231      ( 12 Jan 2024 06:07 )             29.5     Plan:  NS stable, no plans for NS intervention  Consider isometric exercises to RLE- ?pain from disuse?   q4 neuro checks   pain control as needed  abx for Cdiff per ID  AEDs per neurology and family wishes  Continue holding Eliquis- unsure when and if patient would be a safe candidate to resume with multiple episodes of new ICH after attempting to resume. Risk benefit alternative analysis per primary team  SCds DVT ppx  Management per primary team  Discussed with Dr. Larios.

## 2024-01-12 NOTE — DISCHARGE NOTE PROVIDER - NSDCCPCAREPLAN_GEN_ALL_CORE_FT
PRINCIPAL DISCHARGE DIAGNOSIS  Diagnosis: Hemorrhage in the brain  Assessment and Plan of Treatment: S/p Craniotomy and Cranioplasty  EEG shows epileptiform changes  Hold AEDs as per family request  F/u with Neurosurgery outpatient      SECONDARY DISCHARGE DIAGNOSES  Diagnosis: Fungemia  Assessment and Plan of Treatment: Completed Caspofungin course  Repeat Blood cultures are negative    Diagnosis: Pneumonia due to Klebsiella pneumoniae  Assessment and Plan of Treatment: Completed Merrem course    Diagnosis: C. difficile diarrhea  Assessment and Plan of Treatment: Completed Vancomycin course with resolution of diarrhea    Diagnosis: Atrial fibrillation  Assessment and Plan of Treatment: Continue Metoprolol 25 mg   Eliquis to be held due to intracranial hemorrhage

## 2024-01-12 NOTE — SWALLOW VFSS/MBS ASSESSMENT ADULT - ADDITIONAL RECOMMENDATIONS
Dysphagia tx & speech tx as schedule permits with TX RX following discharge from this facility   Dysphagia tx to include trials of soft/bite-sized & easy to chew solids with treating ST
Traditional dysphagia tx  SLP to follow

## 2024-01-12 NOTE — SWALLOW VFSS/MBS ASSESSMENT ADULT - ROSENBEK'S PENETRATION ASPIRATION SCALE
(1) no aspiration, contrast does not enter airway in 2/6 trials/(3) contrast remains above the vocal cords, visible residue remains (penetration) 2/3 trials, Penetration/aspiration scale core: 5 (contrast contacts vocal folds with visible residue remaining) in 1/3 trials/(3) contrast remains above the vocal cords, visible residue remains (penetration)

## 2024-01-18 ENCOUNTER — INPATIENT (INPATIENT)
Facility: HOSPITAL | Age: 84
LOS: 4 days | Discharge: OTHER INSTITUTION | DRG: 299 | End: 2024-01-23
Attending: STUDENT IN AN ORGANIZED HEALTH CARE EDUCATION/TRAINING PROGRAM | Admitting: INTERNAL MEDICINE
Payer: MEDICARE

## 2024-01-18 VITALS
OXYGEN SATURATION: 95 % | SYSTOLIC BLOOD PRESSURE: 99 MMHG | HEIGHT: 67 IN | DIASTOLIC BLOOD PRESSURE: 75 MMHG | RESPIRATION RATE: 20 BRPM | WEIGHT: 154.98 LBS | HEART RATE: 102 BPM | TEMPERATURE: 98 F

## 2024-01-18 DIAGNOSIS — Z95.5 PRESENCE OF CORONARY ANGIOPLASTY IMPLANT AND GRAFT: Chronic | ICD-10-CM

## 2024-01-18 DIAGNOSIS — Z98.89 OTHER SPECIFIED POSTPROCEDURAL STATES: Chronic | ICD-10-CM

## 2024-01-18 DIAGNOSIS — Z98.890 OTHER SPECIFIED POSTPROCEDURAL STATES: Chronic | ICD-10-CM

## 2024-01-18 LAB
ALBUMIN SERPL ELPH-MCNC: 2.3 G/DL — LOW (ref 3.3–5)
ALP SERPL-CCNC: 93 U/L — SIGNIFICANT CHANGE UP (ref 40–120)
ALT FLD-CCNC: 16 U/L — SIGNIFICANT CHANGE UP (ref 12–78)
ANION GAP SERPL CALC-SCNC: 4 MMOL/L — LOW (ref 5–17)
APTT BLD: 28.5 SEC — SIGNIFICANT CHANGE UP (ref 24.5–35.6)
AST SERPL-CCNC: 9 U/L — LOW (ref 15–37)
BASOPHILS # BLD AUTO: 0.06 K/UL — SIGNIFICANT CHANGE UP (ref 0–0.2)
BASOPHILS NFR BLD AUTO: 0.5 % — SIGNIFICANT CHANGE UP (ref 0–2)
BILIRUB SERPL-MCNC: 0.4 MG/DL — SIGNIFICANT CHANGE UP (ref 0.2–1.2)
BUN SERPL-MCNC: 26 MG/DL — HIGH (ref 7–23)
CALCIUM SERPL-MCNC: 9.5 MG/DL — SIGNIFICANT CHANGE UP (ref 8.5–10.1)
CHLORIDE SERPL-SCNC: 109 MMOL/L — HIGH (ref 96–108)
CO2 SERPL-SCNC: 27 MMOL/L — SIGNIFICANT CHANGE UP (ref 22–31)
CREAT SERPL-MCNC: 0.89 MG/DL — SIGNIFICANT CHANGE UP (ref 0.5–1.3)
EGFR: 86 ML/MIN/1.73M2 — SIGNIFICANT CHANGE UP
EOSINOPHIL # BLD AUTO: 0.16 K/UL — SIGNIFICANT CHANGE UP (ref 0–0.5)
EOSINOPHIL NFR BLD AUTO: 1.3 % — SIGNIFICANT CHANGE UP (ref 0–6)
GLUCOSE SERPL-MCNC: 125 MG/DL — HIGH (ref 70–99)
HCT VFR BLD CALC: 27 % — LOW (ref 39–50)
HGB BLD-MCNC: 9 G/DL — LOW (ref 13–17)
IMM GRANULOCYTES NFR BLD AUTO: 1.2 % — HIGH (ref 0–0.9)
INR BLD: 1.14 RATIO — SIGNIFICANT CHANGE UP (ref 0.85–1.18)
LYMPHOCYTES # BLD AUTO: 1.71 K/UL — SIGNIFICANT CHANGE UP (ref 1–3.3)
LYMPHOCYTES # BLD AUTO: 14.2 % — SIGNIFICANT CHANGE UP (ref 13–44)
MCHC RBC-ENTMCNC: 30.7 PG — SIGNIFICANT CHANGE UP (ref 27–34)
MCHC RBC-ENTMCNC: 33.3 GM/DL — SIGNIFICANT CHANGE UP (ref 32–36)
MCV RBC AUTO: 92.2 FL — SIGNIFICANT CHANGE UP (ref 80–100)
MONOCYTES # BLD AUTO: 0.89 K/UL — SIGNIFICANT CHANGE UP (ref 0–0.9)
MONOCYTES NFR BLD AUTO: 7.4 % — SIGNIFICANT CHANGE UP (ref 2–14)
NEUTROPHILS # BLD AUTO: 9.06 K/UL — HIGH (ref 1.8–7.4)
NEUTROPHILS NFR BLD AUTO: 75.4 % — SIGNIFICANT CHANGE UP (ref 43–77)
PLATELET # BLD AUTO: 182 K/UL — SIGNIFICANT CHANGE UP (ref 150–400)
POTASSIUM SERPL-MCNC: 3.7 MMOL/L — SIGNIFICANT CHANGE UP (ref 3.5–5.3)
POTASSIUM SERPL-SCNC: 3.7 MMOL/L — SIGNIFICANT CHANGE UP (ref 3.5–5.3)
PROT SERPL-MCNC: 6.6 GM/DL — SIGNIFICANT CHANGE UP (ref 6–8.3)
PROTHROM AB SERPL-ACNC: 12.8 SEC — SIGNIFICANT CHANGE UP (ref 9.5–13)
RBC # BLD: 2.93 M/UL — LOW (ref 4.2–5.8)
RBC # FLD: 15.2 % — HIGH (ref 10.3–14.5)
SODIUM SERPL-SCNC: 140 MMOL/L — SIGNIFICANT CHANGE UP (ref 135–145)
WBC # BLD: 12.03 K/UL — HIGH (ref 3.8–10.5)
WBC # FLD AUTO: 12.03 K/UL — HIGH (ref 3.8–10.5)

## 2024-01-18 PROCEDURE — 93970 EXTREMITY STUDY: CPT | Mod: 26

## 2024-01-18 PROCEDURE — 99285 EMERGENCY DEPT VISIT HI MDM: CPT

## 2024-01-18 PROCEDURE — 70450 CT HEAD/BRAIN W/O DYE: CPT | Mod: 26,MA

## 2024-01-18 PROCEDURE — 71275 CT ANGIOGRAPHY CHEST: CPT | Mod: 26,MA

## 2024-01-18 RX ORDER — SODIUM CHLORIDE 9 MG/ML
500 INJECTION INTRAMUSCULAR; INTRAVENOUS; SUBCUTANEOUS ONCE
Refills: 0 | Status: COMPLETED | OUTPATIENT
Start: 2024-01-18 | End: 2024-01-18

## 2024-01-18 RX ADMIN — SODIUM CHLORIDE 500 MILLILITER(S): 9 INJECTION INTRAMUSCULAR; INTRAVENOUS; SUBCUTANEOUS at 23:00

## 2024-01-18 NOTE — ED PROVIDER NOTE - CARDIAC, MLM
Normal rate, regular rhythm.  Heart sounds S1, S2.  No murmurs, rubs or gallops. rapid rate, regular rhythm.  Heart sounds S1, S2.  No rubs or gallops. 2+ pulses in bilateral dp and radial arteries. Cap refill less than 2 seconds.

## 2024-01-18 NOTE — ED PROVIDER NOTE - NSICDXPASTMEDICALHX_GEN_ALL_CORE_FT
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

## 2024-01-18 NOTE — ED PROVIDER NOTE - DIFFERENTIAL DIAGNOSIS
Differential Diagnosis leg swelling, hx of previous adverse reactions to anticoagulation, labs, imaging, r/o DVT, r/o PE. CT shows evidence of bilateral distal pulmonary subsegmental PE, DVT in Rt leg, seen by vascular consider IR consult for IVC filter, patient with intact 2+ pulses in legs. Patient with hx of intracranial bleeding due to lovenox and also possible GI bleeding secondary to anticoagulant including ASA. Can not at the moment anticoagulate patient due to the high risk of bleeding. Risk and benefit of bleeding discussed with wife and patient both of whom agree no anticoagulation. Spoke with Dr. Juan Live, hospitalist admission of the patient is appreciated.

## 2024-01-18 NOTE — CONSULT NOTE ADULT - ASSESSMENT
84yo M with extensive bilateral LE DVTs and small PE. Based on documentation from last admission, patient is not a candidate for oral anticoagulation.    Recommendations:  - IR consult for IVC filter placement  - monitor VS  - pain control as needed  - continue outpatient follow up with primary vascular surgeon as needed  - patient does not require vascular surgery intervention at this time, please reconsult as needed    Discussed with Dr. Jordan

## 2024-01-18 NOTE — ED ADULT NURSE NOTE - NSFALLHARMRISKINTERV_ED_ALL_ED

## 2024-01-18 NOTE — ED PROVIDER NOTE - MUSCULOSKELETAL, MLM
Spine appears normal, range of motion is not limited, no muscle or joint tenderness Spine appears normal, range of motion is baseline. No saddle anesthesia. No nuchal rigidity.

## 2024-01-18 NOTE — ED ADULT TRIAGE NOTE - CHIEF COMPLAINT QUOTE
Pt presents to er from [facility] for confirmed DVT to RLE, pt denies chest pain, sob at this time, daughters numbers are [phone]  PT denies chest pain, sob at this time.

## 2024-01-18 NOTE — ED ADULT NURSE NOTE - CHIEF COMPLAINT QUOTE
Pt presents to er from [facility] for confirmed DVT to RLE, pt denies chest pain, sob at this time, daughters numbers are [phone]  PT denies chest pain, sob at this time.

## 2024-01-18 NOTE — ED PROVIDER NOTE - PHYSICAL EXAMINATION
PA NOTE: GEN: AOX3, NAD. HEENT: Throat clear. Airway is patent. EYES: PERRLA. EOMI. Head: NC/AT. NECK: Supple, No JVD. FROM. C-spine non-tender. CV:S1S2, RRR, LUNGS: Non-labored breathing, no tachypnea. O2sat 100% RA. CTA b/l. No w/r/r. CHEST: Equal chest expansion and rise. No deformity. ABD: Soft, NT/ND, no rebound, no guarding. No CVAT. EXT: No e/c/c. 2+ distal pulses. Mild diffuse swelling b/l LE's. SKIN: No rashes. NEURO: No focal deficits. CN II-XII intact. FROM. 5/5 motor and sensory. ~Dillon Huerta PA-C

## 2024-01-18 NOTE — ED PROVIDER NOTE - EYES, MLM
Clear bilaterally, pupils equal, round and reactive to light. Clear bilaterally, pupils equal, round and reactive to accomodation. Visual fields intact.

## 2024-01-18 NOTE — CONSULT NOTE ADULT - SUBJECTIVE AND OBJECTIVE BOX
83y M with PMH of HTN, CAD s/p stents, renal cell carcinoma status post left nephrectomy, bladder CA, BPH, CHF, LBBB, vertigo, HLD, 5.5cm AAA without rupture post EVAR, paroxysmal A-fib, early stage Alzheimer dementia, recent history of RIGHT frontal IPH, SDH, SAH s/p craniotomy with evac (11/10/23), cranioplasty (12/7/2023), prolonged hospital stay complicated by multiple ICH after attempting to restart Eliquis, and dc'd to [facility] rehab for 2 months presents to the ED c/o right leg swelling x 2 days. Patient had outpatient doppler showing RLE DVT. Patient has left sided hemiparesis since CVA. History attained from family at bedside.     Physical Exam:  Pt is AAOx  General: Lying stiffly, LLE paralysis  Chest: Non-labored respirations, symmetric chest rise  Heart: tachycardic  Abdomen: Soft, nondistended  Neuro: Paralysis of LLE with foot drop. One to two-word appropriate responses. 1/5 strength RLE.   Skin: Normal, no rashes, no lesions noted.   Extremities: Bilateral LE warm with good capillary refill, no skin discoloration or wounds. Tender to palpation behind R knee.     Vital Signs Last 24 Hrs  T(C): 36.8 (18 Jan 2024 16:48), Max: 36.8 (18 Jan 2024 16:48)  T(F): 98.2 (18 Jan 2024 16:48), Max: 98.2 (18 Jan 2024 16:48)  HR: 118 (18 Jan 2024 22:02) (102 - 118)  BP: 142/80 (18 Jan 2024 22:02) (99/75 - 142/80)  BP(mean): --  RR: 18 (18 Jan 2024 22:02) (18 - 20)  SpO2: 100% (18 Jan 2024 22:02) (95% - 100%)    Parameters below as of 18 Jan 2024 22:02  Patient On (Oxygen Delivery Method): room air                            9.0    12.03 )-----------( 182      ( 18 Jan 2024 18:37 )             27.0     01-18    140  |  109<H>  |  26<H>  ----------------------------<  125<H>  3.7   |  27  |  0.89    Ca    9.5      18 Jan 2024 21:11    TPro  6.6  /  Alb  2.3<L>  /  TBili  0.4  /  DBili  x   /  AST  9<L>  /  ALT  16  /  AlkPhos  93  01-18    I&O's Summary    < from: CT Angio Chest PE Protocol w/ IV Cont (01.18.24 @ 22:44) >  IMPRESSION:    Tiny subsegmental pulmonary embolus in the right middle lobe.    Mild centrilobular nodular opacities bilaterally in all lobes, probably   infectious/inflammatory.    < end of copied text >  < from: CT Head No Cont (01.18.24 @ 22:43) >  IMPRESSION:  No acute intracranial hemorrhage or mass effect.    < end of copied text >  < from: US Duplex Venous Lower Ext Complete, Bilateral (01.18.24 @ 18:25) >  IMPRESSION:  Acute deep venous thrombosis: above and below the knee.    Extensive acute deep venous thrombosis of the right lower extremity above   and below the knee, as detailed above.    Acute deep venous thrombosis of the left lower extremity below the knee,   involving the left gastrocnemius vein.    < end of copied text >   83y M with PMH of HTN, CAD s/p stents, renal cell carcinoma status post left nephrectomy, bladder CA, BPH, CHF, LBBB, vertigo, HLD, 5.5cm AAA without rupture post EVAR, paroxysmal A-fib, early stage Alzheimer dementia, recent history of RIGHT frontal IPH, SDH, SAH s/p craniotomy with evac (11/10/23), cranioplasty (12/7/2023), prolonged hospital stay complicated by multiple ICH after attempting to restart Eliquis, and dc'd to [facility] rehab for 2 months presents to the ED c/o right leg swelling x 2 days. Patient had outpatient doppler showing RLE DVT. Patient has left sided hemiparesis since CVA. History attained from family at bedside.     Physical Exam:  Pt is AAOx  General: Lying stiffly, LLE paralysis  Chest: Non-labored respirations, symmetric chest rise  Heart: tachycardic  Abdomen: Soft, nondistended  Neuro: Paralysis of LLE with foot drop. One to two-word appropriate responses. 1/5 strength RLE.   Skin: Normal, no rashes, no lesions noted.   Extremities: Bilateral LE warm with good capillary refill, no skin discoloration or wounds. Tender to palpation behind R knee.   Vasc: Bilateral PT and DP with doppler signals    Vital Signs Last 24 Hrs  T(C): 36.8 (18 Jan 2024 16:48), Max: 36.8 (18 Jan 2024 16:48)  T(F): 98.2 (18 Jan 2024 16:48), Max: 98.2 (18 Jan 2024 16:48)  HR: 118 (18 Jan 2024 22:02) (102 - 118)  BP: 142/80 (18 Jan 2024 22:02) (99/75 - 142/80)  BP(mean): --  RR: 18 (18 Jan 2024 22:02) (18 - 20)  SpO2: 100% (18 Jan 2024 22:02) (95% - 100%)    Parameters below as of 18 Jan 2024 22:02  Patient On (Oxygen Delivery Method): room air                            9.0    12.03 )-----------( 182      ( 18 Jan 2024 18:37 )             27.0     01-18    140  |  109<H>  |  26<H>  ----------------------------<  125<H>  3.7   |  27  |  0.89    Ca    9.5      18 Jan 2024 21:11    TPro  6.6  /  Alb  2.3<L>  /  TBili  0.4  /  DBili  x   /  AST  9<L>  /  ALT  16  /  AlkPhos  93  01-18    I&O's Summary    < from: CT Angio Chest PE Protocol w/ IV Cont (01.18.24 @ 22:44) >  IMPRESSION:    Tiny subsegmental pulmonary embolus in the right middle lobe.    Mild centrilobular nodular opacities bilaterally in all lobes, probably   infectious/inflammatory.    < end of copied text >  < from: CT Head No Cont (01.18.24 @ 22:43) >  IMPRESSION:  No acute intracranial hemorrhage or mass effect.    < end of copied text >  < from: US Duplex Venous Lower Ext Complete, Bilateral (01.18.24 @ 18:25) >  IMPRESSION:  Acute deep venous thrombosis: above and below the knee.    Extensive acute deep venous thrombosis of the right lower extremity above   and below the knee, as detailed above.    Acute deep venous thrombosis of the left lower extremity below the knee,   involving the left gastrocnemius vein.    < end of copied text >

## 2024-01-18 NOTE — ED ADULT NURSE NOTE - OBJECTIVE STATEMENT
Alert, denies pain, BIBEMS from [facility], presents with LE DVT, no AC 2/2 hx brain bleed, Pt does not paul. anticoagulants.  R foot noted, Alert, denies pain, BIBEMS from [facility], presents with RLE DVT, no AC 2/2 hx brain bleed, Pt does not paul. anticoagulants.  R foot drop noted, + edema, cardiac hx noted. Alert, denies pain, BIBEMS from [facility], presents with RLE DVT, no AC 2/2 hx brain bleed, Pt does not paul. anticoagulants.  R foot drop noted, + edema, cardiac hx noted.  Stage 3 pressure ulcer to sacrum. dressing applied

## 2024-01-18 NOTE — ED PROVIDER NOTE - OBJECTIVE STATEMENT
PA: Patient is an 83y M with PMH of HTN, CAD s/p stents, renal cell carcinoma status post left nephrectomy, bladder CA, BPH, CHF, LBBB, vertigo,  HLD, 5.5cm AAA without rupture post EVAR, paroxysmal A-fib early stage Alzheimer dementia, recent history of RIGHT frontal IPH, SDH, SAH s/p craniotomy with evac and dc'd to [facility] rehab for 2 months, who presents to [facility] c/o right leg swelling x 2 days. Patient had outpatient doppler done that showed RLE DVT. Patient has left sided hemiparesis since CVA, mostly non-verbal. HPI/ROS obtained from family. ~Dillon Huerta PA-C Patient is an 83y M with PMH of HTN, CAD s/p stents, renal cell carcinoma status post left nephrectomy, bladder CA, BPH, CHF, LBBB, vertigo,  HLD, 5.5cm AAA without rupture post EVAR, paroxysmal A-fib early stage Alzheimer dementia, recent history of RIGHT frontal IPH, SDH, SAH s/p craniotomy with evac and dc'd to [facility] rehab for 2 months, who presents to [facility] c/o right leg swelling x 2 days. Patient had outpatient doppler done that showed RLE DVT. Patient has left sided hemiparesis since CVA, mostly non-verbal. HPI/ROS obtained from family.

## 2024-01-18 NOTE — ED PROVIDER NOTE - ATTENDING APP SHARED VISIT CONTRIBUTION OF CARE
I Constantin Solares MD saw and examined the patient. MLP saw and examined the patient under my supervision. I discussed the care of the patient with MLP and agree with MLP's plan, assessment and care of the patient while in the ED.

## 2024-01-18 NOTE — ED PROVIDER NOTE - NEUROLOGICAL, MLM
Alert and oriented, no focal deficits, no motor or sensory deficits. Alert and oriented, baseline neurological findings (hx of CVA, baseline as per pt and spouse, baseline dysarthria, motor function baseline with hemiplegic weakness, etc.) GCS=15

## 2024-01-18 NOTE — ED PROVIDER NOTE - CLINICAL SUMMARY MEDICAL DECISION MAKING FREE TEXT BOX
leg swelling, hx of previous adverse reactions to anticoagulation, labs, imaging, r/o DVT, r/o PE. CT shows evidence of bilateral distal pulmonary subsegmental PE, DVT in Rt leg, seen by vascular consider IR consult for IVC filter, patient with intact 2+ pulses in legs. Patient with hx of intracranial bleeding due to lovenox and also possible GI bleeding secondary to anticoagulant including ASA. Can not at the moment anticoagulate patient due to the high risk of bleeding. Risk and benefit of bleeding discussed with wife and patient both of whom agree no anticoagulation. Spoke with Dr. Juan Live, hospitalist admission of the patient is appreciated.

## 2024-01-18 NOTE — ED PROVIDER NOTE - PROGRESS NOTE DETAILS
spoke with hospitalist dr. De Jesus, wants vascular/neurosurgery to see patient first before accepting admission. ~Dillon Huerta PA-C +b/l LE extensive DVTs. Will get CTA chest, TBA. ~Dillon Huerta PA-C spoke with vascular surgery resident, will see patient in ED. ~Dillon Huerta PA-C Patient seen and evaluated. Will get b/l LE doppler, basic labs. Reassess. ~Dillon Huerta PA-C Sujatha BUTTS: CT shows evidence of bilateral distal pulmonary subsegmental PE, DVT in Rt leg, seen by vascular consider IR consult for IVC filter, patient with intact 2+ pulses in legs. Patient with hx of intracranial bleeding due to lovenox and also possible GI bleeding secondary to anticoagulant including ASA. Can not at the moment anticoagulate patient due to the high risk of bleeding. Risk and benefit of bleeding discussed with wife and patient both of whom agree no anticoagulation. Spoke with Dr. Juan Live, hospitalist admission of the patient is appreciated.

## 2024-01-19 DIAGNOSIS — I26.99 OTHER PULMONARY EMBOLISM WITHOUT ACUTE COR PULMONALE: ICD-10-CM

## 2024-01-19 PROCEDURE — 36415 COLL VENOUS BLD VENIPUNCTURE: CPT

## 2024-01-19 PROCEDURE — C1887: CPT

## 2024-01-19 PROCEDURE — 97116 GAIT TRAINING THERAPY: CPT | Mod: GP

## 2024-01-19 PROCEDURE — 80048 BASIC METABOLIC PNL TOTAL CA: CPT

## 2024-01-19 PROCEDURE — 37191 INS ENDOVAS VENA CAVA FILTR: CPT

## 2024-01-19 PROCEDURE — C1894: CPT

## 2024-01-19 PROCEDURE — 85027 COMPLETE CBC AUTOMATED: CPT

## 2024-01-19 PROCEDURE — C1769: CPT

## 2024-01-19 PROCEDURE — 97163 PT EVAL HIGH COMPLEX 45 MIN: CPT | Mod: GP

## 2024-01-19 PROCEDURE — 99222 1ST HOSP IP/OBS MODERATE 55: CPT

## 2024-01-19 PROCEDURE — C1880: CPT

## 2024-01-19 PROCEDURE — 99221 1ST HOSP IP/OBS SF/LOW 40: CPT

## 2024-01-19 RX ORDER — MAGNESIUM HYDROXIDE 400 MG/1
30 TABLET, CHEWABLE ORAL
Refills: 0 | DISCHARGE

## 2024-01-19 RX ORDER — MEMANTINE HYDROCHLORIDE 10 MG/1
1 TABLET ORAL
Refills: 0 | DISCHARGE

## 2024-01-19 RX ORDER — ATORVASTATIN CALCIUM 80 MG/1
80 TABLET, FILM COATED ORAL AT BEDTIME
Refills: 0 | Status: DISCONTINUED | OUTPATIENT
Start: 2024-01-19 | End: 2024-01-23

## 2024-01-19 RX ORDER — METOPROLOL TARTRATE 50 MG
25 TABLET ORAL
Refills: 0 | Status: DISCONTINUED | OUTPATIENT
Start: 2024-01-19 | End: 2024-01-23

## 2024-01-19 RX ORDER — LACOSAMIDE 50 MG/1
50 TABLET ORAL
Refills: 0 | Status: DISCONTINUED | OUTPATIENT
Start: 2024-01-19 | End: 2024-01-19

## 2024-01-19 RX ORDER — METOPROLOL TARTRATE 50 MG
25 TABLET ORAL EVERY 6 HOURS
Refills: 0 | Status: DISCONTINUED | OUTPATIENT
Start: 2024-01-19 | End: 2024-01-19

## 2024-01-19 RX ORDER — DOXAZOSIN MESYLATE 4 MG
2 TABLET ORAL AT BEDTIME
Refills: 0 | Status: DISCONTINUED | OUTPATIENT
Start: 2024-01-19 | End: 2024-01-23

## 2024-01-19 RX ORDER — PANTOPRAZOLE SODIUM 20 MG/1
40 TABLET, DELAYED RELEASE ORAL
Refills: 0 | Status: DISCONTINUED | OUTPATIENT
Start: 2024-01-19 | End: 2024-01-23

## 2024-01-19 RX ORDER — NYSTATIN CREAM 100000 [USP'U]/G
1 CREAM TOPICAL
Refills: 0 | Status: DISCONTINUED | OUTPATIENT
Start: 2024-01-19 | End: 2024-01-23

## 2024-01-19 RX ORDER — COLLAGENASE CLOSTRIDIUM HIST. 250 UNIT/G
1 OINTMENT (GRAM) TOPICAL DAILY
Refills: 0 | Status: DISCONTINUED | OUTPATIENT
Start: 2024-01-19 | End: 2024-01-23

## 2024-01-19 RX ORDER — DOXAZOSIN MESYLATE 4 MG
1 TABLET ORAL
Refills: 0 | DISCHARGE

## 2024-01-19 RX ORDER — ATORVASTATIN CALCIUM 80 MG/1
1 TABLET, FILM COATED ORAL
Refills: 0 | DISCHARGE

## 2024-01-19 RX ORDER — MEMANTINE HYDROCHLORIDE 10 MG/1
10 TABLET ORAL
Refills: 0 | Status: DISCONTINUED | OUTPATIENT
Start: 2024-01-19 | End: 2024-01-19

## 2024-01-19 RX ORDER — PANTOPRAZOLE SODIUM 20 MG/1
1 TABLET, DELAYED RELEASE ORAL
Refills: 0 | DISCHARGE

## 2024-01-19 RX ORDER — FOLIC ACID 0.8 MG
1 TABLET ORAL DAILY
Refills: 0 | Status: DISCONTINUED | OUTPATIENT
Start: 2024-01-19 | End: 2024-01-23

## 2024-01-19 RX ORDER — FOLIC ACID 0.8 MG
1 TABLET ORAL
Refills: 0 | DISCHARGE

## 2024-01-19 RX ADMIN — Medication 2 MILLIGRAM(S): at 21:31

## 2024-01-19 RX ADMIN — Medication 25 MILLIGRAM(S): at 06:25

## 2024-01-19 RX ADMIN — ATORVASTATIN CALCIUM 80 MILLIGRAM(S): 80 TABLET, FILM COATED ORAL at 21:30

## 2024-01-19 RX ADMIN — Medication 25 MILLIGRAM(S): at 21:30

## 2024-01-19 NOTE — CONSULT NOTE ADULT - SUBJECTIVE AND OBJECTIVE BOX
Neurosurgery Consult Note       Patient is a 83y old  Male who presents with a chief complaint of Other pulmonary embolism without acute cor pulmonale     (19 Jan 2024 10:48)      HPI:  84 yo Male well known to our service with PMH of HTN, CAD s/p stents, renal cell carcinoma status post left nephrectomy, bladder CA, BPH, CHF, LBBB, vertigo,  HLD, 5.5cm AAA without rupture post EVAR, paroxysmal A-fib ( was on Eliquis ) early stage Alzheimer dementia, recent history of Fall hit his head developed a RIGHT Frontal IPH, SDH, SAH s/p Craniectomy November 2023 with evacuation with Dr Larios at [facility] , subsequently he underwent Right Cranioplasty in December 2023 . He was restarted on his Eliquis but developed recurrence of ICH x 2 attempt . Third time Eliquis was restarted he developed a GI bleed complicated by Aspiration of blood leading to Septic Shock , with Valvular Vegetations ( fungal )  treated  with IV Antibiotics, subsequently transferred to  [facility] for [facility] for the last 2 weeks . While ar Rehab he was too lethargic to participate with PT due to medications ( Vimpat and Lopressor dose being too frequent ) . Daughter noted his left leg was swelling and hot to the touch x 2 days . He was then sent to [facility]. Work up revealed RLE DVT. Patient has left sided hemiparesis since CVA, mostly non-verbal. Daughter provides History,   (19 Jan 2024 05:11)    I personally discussed the Pt's status and question of Anticoagulation , daughter does not want to try ACT again since he had 3 attempts with Eliquis with associated Bleeding .     Pt is lethargic but can be aroused to stimulation opens eyes , attempts to speak when asked questions , follow ing simple commands . Denies headaches or pain at this time .     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)   Congestive heart failure, unspecified chronicity, unspecified heart failure type   Hypertension   Alzheimers disease  Urethral Stent 7/2008 stent placement and removal    SURGICAL HISTORY:  S/P Cystoscopy  bladder polyps removal multiple times (since 1999), 6/10 , 10/2011 & 10/17/2011, 5/12, 11/2012, last 5/13/13, 12/2013, 7/2014  S/P Tonsillectomy  History of Lt  Nephrectomy  6/10 - left  History of cystoscopy  April 2015  H/O abdominal aortic aneurysm repair  S/P AAA repair  History of nephrectomy Left  Presence of stent in LAD coronary artery  History of heart artery stent    Psoc Hx :    FHx:    Allergies    heparin (Other)  Cipro (Other)  penicillin (Rash)  penicillin (Hives)  Levaquin (Other)  heparin (Other (Severe))    Intolerances    atorvastatin 80 milliGRAM(s) Oral at bedtime  doxazosin 2 milliGRAM(s) Oral at bedtime  folic acid 1 milliGRAM(s) Oral daily  metoprolol tartrate 25 milliGRAM(s) Oral every 6 hours  pantoprazole    Tablet 40 milliGRAM(s) Oral two times a day        Neuro:     Mental status:   The patient is lethargic eyes closed , opens eyes to stimulation , follows simple commands    Speech is clear soft confused at baseline   Cranial nerves:  CN II: Visual fields are full to confrontation.   CN III, IV, and VI: Right gaze preference no eye deviation, no ophthalmoplegia no Nystagmus No ptosis.   CN V: Facial sensation is intact to light touch in all 3 divisions bilaterally.  CN VII: Face is symmetric with normal eye closure and smile.  CN VII: Hearing is normal   CN IX, X: Palate elevates symmetrically. Phonation is normal.  CN XI: Head turning and shoulder shrug are intact  CN XII: Tongue is midline with normal movements and no atrophy.    Motor:   Left Hemiparesis LUE in Wrist splint   LLE in AFO for foot drop   RUE moves spontaneously with good strength   RLE Swelling noted wriggles toes     Wound well heeled          Labs                  9.0    12.03 )-----------( 182      ( 18 Jan 2024 18:37 )             27.0    01-18    140  |  109<H>  |  26<H>  ----------------------------<  125<H>  3.7   |  27  |  0.89    Ca    9.5      18 Jan 2024 21:11    TPro  6.6  /  Alb  2.3<L>  /  TBili  0.4  /  DBili  x   /  AST  9<L>  /  ALT  16  /  AlkPhos  93  01-18      PT/INR - ( 18 Jan 2024 21:11 )   PT: 12.8 sec;   INR: 1.14 ratio         PTT - ( 18 Jan 2024 21:11 )  PTT:28.5 sec    Urinalysis Basic - ( 18 Jan 2024 21:11 )    Color: x / Appearance: x / SG: x / pH: x  Gluc: 125 mg/dL / Ketone: x  / Bili: x / Urobili: x   Blood: x / Protein: x / Nitrite: x   Leuk Esterase: x / RBC: x / WBC x   Sq Epi: x / Non Sq Epi: x / Bacteria: x      Imaging:     ACC: [id number] EXAM:  CT BRAIN   ORDERED BY: OSCAR QUIÑONES     PROCEDURE DATE:  01/18/2024          INTERPRETATION:  CT HEAD WITHOUT CONTRAST    INDICATION: 83 years old. Male. Headache.    COMPARISON: 12/22/2023.    TECHNIQUE: Noncontrast axial CT headwas obtained from the skull base to   vertex.    FINDINGS:  Status post right cranioplasty with tiny subjacent chronic extra-axial   collection versus dural thickening.  No acute intracranial hemorrhage, mass effect or midline shift.  No CT evidence of acute large vascular territory infarct.  The ventricles and cortical sulci are prominent reflecting parenchymal   volume loss.  Patchy hypodensities in the periventricular white matter are nonspecific,   but likely sequela of small vessel ischemic disease.    Encephalomalacia and gliosis of the right frontal lobe.    The paranasal sinuses and mastoid air cells are well aerated.  No displaced calvarial fracture.    IMPRESSION:  No acute intracranial hemorrhage or mass effect.    --- End of Report ---        PHUC BAEZ MD; Attending Radiologist  This document has been electronically signed. Jan 18 2024 10:53PM      A/P: 83y with a multiple medical problems as above very complicated course since falling hitting his head sustained acute SDH/ IPH underwent Craniectomy in 11/23 followed by Cranioplasty 12/23 post op 3 attempts at re-attempts to restart Eliquis resulted in ICH x 2 , GI bleed x1 . Stable     Pt is due to have IVCF placed in IR today   Spoke to daughter regrading risks benefits of  AC to prevent new thrombus   Daughter has requested that he not be started on anticoagulation since the risk of bleed is too high   Case discussed with Dr Larios

## 2024-01-19 NOTE — DIETITIAN INITIAL EVALUATION ADULT - ORAL NUTRITION SUPPLEMENTS
Add mildly thick ensure plus high protein BID to optimize PO intake (provides 350 kcal, 20g protein/ shake)

## 2024-01-19 NOTE — PATIENT PROFILE ADULT - FALL HARM RISK - RISK INTERVENTIONS

## 2024-01-19 NOTE — DIETITIAN INITIAL EVALUATION ADULT - PERTINENT LABORATORY DATA
01-18    140  |  109<H>  |  26<H>  ----------------------------<  125<H>  3.7   |  27  |  0.89    Ca    9.5      18 Jan 2024 21:11    TPro  6.6  /  Alb  2.3<L>  /  TBili  0.4  /  DBili  x   /  AST  9<L>  /  ALT  16  /  AlkPhos  93  01-18  A1C with Estimated Average Glucose Result: 5.6 % (11-14-23 @ 14:00)  A1C with Estimated Average Glucose Result: 5.4 % (11-14-23 @ 11:20)

## 2024-01-19 NOTE — PHARMACOTHERAPY INTERVENTION NOTE - COMMENTS
Completed medication history as per [facility] paperwork  Of note, patient had Vimpat ordered 50 mg PO Q12H but only received 1 dose [1/13 10AM]  No other administrations recorded for Vimpat on facility paperwork  No other AED noted    Home Medications:  atorvastatin 80 mg oral tablet: 1 tab(s) orally once a day (at bedtime)  bisacodyl 10 mg rectal suppository: 1 suppository(ies) rectally once a day as needed for  constipation  doxazosin 2 mg oral tablet: 1 tab(s) orally once a day (at bedtime)  Fleet Enema 19 g-7 g rectal enema: 133 milliliter(s) rectally once a day as needed for  constipation  folic acid 1 mg oral tablet: 1 tab(s) orally once a day  metoprolol tartrate 25 mg oral tablet: 1 tab(s) orally every 6 hours  Milk of Magnesia 1200 mg/15 mL oral liquid: 30 milliliter(s) orally once a day as needed for  constipation  Multiple Vitamins oral tablet: 1 tab(s) orally once a day  pantoprazole 40 mg oral delayed release tablet: 1 tab(s) orally 2 times a day   Completed medication history as per [facility] paperwork  Of note, patient had Vimpat ordered 50 mg PO Q12H but only received 1 dose [1/13 10AM]  No other administrations recorded for Vimpat on facility paperwork  No other AED noted  Previous neurology note Dec 2023 noted: Spoke with his daughter (his HCP) who does not want to start any anti-seizure medications at this time.  She seemed to understand that he is ask risk for seizures due to ICH      Home Medications:  atorvastatin 80 mg oral tablet: 1 tab(s) orally once a day (at bedtime)  bisacodyl 10 mg rectal suppository: 1 suppository(ies) rectally once a day as needed for  constipation  doxazosin 2 mg oral tablet: 1 tab(s) orally once a day (at bedtime)  Fleet Enema 19 g-7 g rectal enema: 133 milliliter(s) rectally once a day as needed for  constipation  folic acid 1 mg oral tablet: 1 tab(s) orally once a day  metoprolol tartrate 25 mg oral tablet: 1 tab(s) orally every 6 hours  Milk of Magnesia 1200 mg/15 mL oral liquid: 30 milliliter(s) orally once a day as needed for  constipation  Multiple Vitamins oral tablet: 1 tab(s) orally once a day  pantoprazole 40 mg oral delayed release tablet: 1 tab(s) orally 2 times a day

## 2024-01-19 NOTE — DIETITIAN INITIAL EVALUATION ADULT - NSFNSGIIOFT_GEN_A_CORE
I&O's Detail    18 Jan 2024 07:01  -  19 Jan 2024 07:00  --------------------------------------------------------  IN:  Total IN: 0 mL    OUT:    Voided (mL): 300 mL  Total OUT: 300 mL    Total NET: -300 mL

## 2024-01-19 NOTE — H&P ADULT - HISTORY OF PRESENT ILLNESS
84 yo Male with PMH of HTN, CAD s/p stents, renal cell carcinoma status post left nephrectomy, bladder CA, BPH, CHF, LBBB, vertigo,  HLD, 5.5cm AAA without rupture post EVAR, paroxysmal A-fib early stage Alzheimer dementia, recent history of RIGHT frontal IPH, SDH, SAH s/p craniotomy with evacuation and dc'd to [facility] rehab for 2 months, who presented to [facility] with complain of right leg swelling x 2 days. Patient had outpatient doppler done that showed RLE DVT. Patient has left sided hemiparesis since CVA, mostly non-verbal. He is a poor historian. History was obtained from family and ER provider.

## 2024-01-19 NOTE — H&P ADULT - ASSESSMENT
A/P:    1.  Acute Pulmonary Emboli  Acute DVT  -as per vascular surgery consult-patient will get IVC filter  -but patient cannot get any Anticoagulation due to multiple episodes of different hemorrhages  -follow clinically    2.   Atrial Fibrillation  - Metoprolol 25mg q6h  - No anticoagulation given ICH    3.  Anemia: likely multifactorial  -  Protonix 40mg BID  - stable Hb    4.  Code status  -Full code

## 2024-01-19 NOTE — H&P ADULT - NSHPPHYSICALEXAM_GEN_ALL_CORE
T(C): 36.9 (01-19-24 @ 04:37), Max: 36.9 (01-19-24 @ 04:37)  HR: 112 (01-19-24 @ 04:37) (102 - 118)  BP: 157/79 (01-19-24 @ 04:37) (99/75 - 157/79)  RR: 17 (01-19-24 @ 04:37) (17 - 20)  SpO2: 99% (01-19-24 @ 04:37) (95% - 100%)    CONSTITUTIONAL: Well groomed, no apparent distress  EYES: PERRLA and symmetric, EOMI, No conjunctival or scleral injection, non-icteric  ENMT: Oral mucosa with moist membranes. Normal dentition; no pharyngeal injection or exudates             NECK: Supple, symmetric and without tracheal deviation   RESP: No respiratory distress, no use of accessory muscles; CTA b/l, no WRR  CV: RRR, +S1S2, no MRG; no JVD; no peripheral edema  GI: Soft, NT, ND, no rebound, no guarding; no palpable masses;   LYMPH: No cervical LAD or tenderness;   MSK: Left sided hemiparesis, No edema.   SKIN: No rashes or ulcers noted;   NEURO: Unable to assess s patient has dementia.   PSYCH: Unable to assess s patient has dementia.

## 2024-01-19 NOTE — DIETITIAN INITIAL EVALUATION ADULT - OTHER INFO
84 yo Male with PMH of HTN, CAD s/p stents, renal cell carcinoma status post left nephrectomy, bladder CA, BPH, CHF, LBBB, vertigo,  HLD, 5.5cm AAA without rupture post EVAR, paroxysmal A-fib early stage Alzheimer dementia, recent history of RIGHT frontal IPH, SDH, SAH s/p craniotomy with evacuation and dc'd to [facility] rehab for 2 months, who presented to [facility] with complain of right leg swelling x 2 days. Patient had outpatient doppler done that showed RLE DVT. Patient has left sided hemiparesis since CVA, mostly non-verbal. He is a poor historian. History was obtained from family and ER provider. Admit for pulmonary embolism.     Known to nutr services w/ dx severe PCM on 11/17/23. Upon RD visit this morning, obtained limited wt hx and information from pt 2/2 dementia and individual at bedside agitated and provided minimal information upon RD visit. No diet ordered and had not had meal yet since admit this morning upon RD visit this morning - now currently on Regular diet. Wt hx 148# on 11/12/23 as per previous RD note. RD obtained bed scale wt 129# on 1/19/24, appears accurate w/ 2+ edema - likely skewing wt and masking further wt loss. Unintentional wt loss (individual at bedside reported that wt loss occurred while in previous hospital) 19# / 12.8% x~2 mo - severe and clin sig. Appears thin, bony, weak and malnourished w/ NFPE revealing severe muscle/fat wasting. As per IR, planned for procedure today pending neurosurgery eval, and states "pt does not need to be NPO." Previously seen by SLP last on 1/12/24 who rec'd minced and moist consistency diet and mildly thick liquids. STRONGLY rec to change diet to regular w/ minced and moist consistency and mildly thick liquids as per previous SLP recs. Will add mildly thick ensure plus high protein BID to optimize PO intake (provides 350 kcal, 20g protein/ shake). STRONGLY rec to confirm goals of care regarding nutrition support - nutrition support is not recommended due to overall declining medical status which evidenced based studies indicate EN is not effective in prolonging survival and improving quality of life. It can also increase risk of aspiration pneumonia as well as other related issues (infection, GI complications, and worsening/ non-healing PI's). However, will provide nutrition/ hydration within GOC. See below for other recs.

## 2024-01-19 NOTE — DIETITIAN INITIAL EVALUATION ADULT - ORAL INTAKE PTA/DIET HISTORY
Limited diet hx obtained 2/2 pt w/ dementia and individual at bedside agitated and provided minimal information upon RD visit. Reports was in hospital x2.5 mo and was on "pureed diet," however, last seen by SLP on 1/12/24 who rec'd minced and moist consistency w/ mildly thick liquids as least restrictive and most tolerated diet consistency.

## 2024-01-19 NOTE — DIETITIAN INITIAL EVALUATION ADULT - PERTINENT MEDS FT
MEDICATIONS  (STANDING):  atorvastatin 80 milliGRAM(s) Oral at bedtime  doxazosin 2 milliGRAM(s) Oral at bedtime  folic acid 1 milliGRAM(s) Oral daily  lacosamide 50 milliGRAM(s) Oral two times a day  memantine 10 milliGRAM(s) Oral two times a day  metoprolol tartrate 25 milliGRAM(s) Oral every 6 hours  pantoprazole    Tablet 40 milliGRAM(s) Oral two times a day    MEDICATIONS  (PRN):

## 2024-01-19 NOTE — DIETITIAN NUTRITION RISK NOTIFICATION - ADDITIONAL COMMENTS/DIETITIAN RECOMMENDATIONS
1. Recommend to change diet to minced and moist consistency and mildly thick liquids as per previous SLP recs to maximize caloric and protein intake and encourage high-kcal, protein-rich foods, maximize food preferences   2. Add mildly thick ensure plus high protein BID to optimize PO intake (provides 350 kcal, 20g protein/ shake)   3. Recommend to add Vit C 500 mg BID and Zinc Sulfate 220 mg x 10 days to promote wound healing.   4. Consider obtaining vitamin D 25OH level to assess nutriture   5. Monitor bowel movements, if no BM for >3 days, consider implementing bowel regimen.   6. Consider adding thiamine 100 mg daily 2/2 poor PO intake/ malnutrition and MVI w/ minerals daily to ensure 100% RDA met   7. Confirm goals of care regarding nutrition support - nutrition support is not recommended due to overall declining medical status which evidenced based studies indicate EN is not effective in prolonging survival and improving quality of life. It can also increase risk of aspiration pneumonia as well as other related issues (infection, GI complications, and worsening/ non-healing PI's). However, will provide nutrition/ hydration within GOC.   RD will continue to monitor PO intake, labs, hydration, and wt prn.

## 2024-01-20 LAB
ANION GAP SERPL CALC-SCNC: 5 MMOL/L — SIGNIFICANT CHANGE UP (ref 5–17)
BUN SERPL-MCNC: 19 MG/DL — SIGNIFICANT CHANGE UP (ref 7–23)
CALCIUM SERPL-MCNC: 9.3 MG/DL — SIGNIFICANT CHANGE UP (ref 8.5–10.1)
CHLORIDE SERPL-SCNC: 112 MMOL/L — HIGH (ref 96–108)
CO2 SERPL-SCNC: 23 MMOL/L — SIGNIFICANT CHANGE UP (ref 22–31)
CREAT SERPL-MCNC: 0.71 MG/DL — SIGNIFICANT CHANGE UP (ref 0.5–1.3)
CULTURE RESULTS: SIGNIFICANT CHANGE UP
EGFR: 92 ML/MIN/1.73M2 — SIGNIFICANT CHANGE UP
GLUCOSE SERPL-MCNC: 99 MG/DL — SIGNIFICANT CHANGE UP (ref 70–99)
HCT VFR BLD CALC: 25 % — LOW (ref 39–50)
HGB BLD-MCNC: 8.4 G/DL — LOW (ref 13–17)
MCHC RBC-ENTMCNC: 31 PG — SIGNIFICANT CHANGE UP (ref 27–34)
MCHC RBC-ENTMCNC: 33.6 GM/DL — SIGNIFICANT CHANGE UP (ref 32–36)
MCV RBC AUTO: 92.3 FL — SIGNIFICANT CHANGE UP (ref 80–100)
PLATELET # BLD AUTO: 172 K/UL — SIGNIFICANT CHANGE UP (ref 150–400)
POTASSIUM SERPL-MCNC: 3.5 MMOL/L — SIGNIFICANT CHANGE UP (ref 3.5–5.3)
POTASSIUM SERPL-SCNC: 3.5 MMOL/L — SIGNIFICANT CHANGE UP (ref 3.5–5.3)
RBC # BLD: 2.71 M/UL — LOW (ref 4.2–5.8)
RBC # FLD: 15 % — HIGH (ref 10.3–14.5)
SODIUM SERPL-SCNC: 140 MMOL/L — SIGNIFICANT CHANGE UP (ref 135–145)
SPECIMEN SOURCE: SIGNIFICANT CHANGE UP
WBC # BLD: 9.81 K/UL — SIGNIFICANT CHANGE UP (ref 3.8–10.5)
WBC # FLD AUTO: 9.81 K/UL — SIGNIFICANT CHANGE UP (ref 3.8–10.5)

## 2024-01-20 PROCEDURE — 99233 SBSQ HOSP IP/OBS HIGH 50: CPT

## 2024-01-20 RX ADMIN — Medication 25 MILLIGRAM(S): at 21:34

## 2024-01-20 RX ADMIN — Medication 1 APPLICATION(S): at 10:25

## 2024-01-20 RX ADMIN — Medication 2 MILLIGRAM(S): at 21:34

## 2024-01-20 RX ADMIN — Medication 25 MILLIGRAM(S): at 09:35

## 2024-01-20 RX ADMIN — PANTOPRAZOLE SODIUM 40 MILLIGRAM(S): 20 TABLET, DELAYED RELEASE ORAL at 09:35

## 2024-01-20 RX ADMIN — NYSTATIN CREAM 1 APPLICATION(S): 100000 CREAM TOPICAL at 21:35

## 2024-01-20 RX ADMIN — Medication 1 MILLIGRAM(S): at 09:35

## 2024-01-20 RX ADMIN — NYSTATIN CREAM 1 APPLICATION(S): 100000 CREAM TOPICAL at 09:36

## 2024-01-20 RX ADMIN — ATORVASTATIN CALCIUM 80 MILLIGRAM(S): 80 TABLET, FILM COATED ORAL at 21:34

## 2024-01-21 PROCEDURE — 99232 SBSQ HOSP IP/OBS MODERATE 35: CPT

## 2024-01-21 RX ADMIN — Medication 2 MILLIGRAM(S): at 21:11

## 2024-01-21 RX ADMIN — Medication 25 MILLIGRAM(S): at 21:11

## 2024-01-21 RX ADMIN — PANTOPRAZOLE SODIUM 40 MILLIGRAM(S): 20 TABLET, DELAYED RELEASE ORAL at 09:13

## 2024-01-21 RX ADMIN — NYSTATIN CREAM 1 APPLICATION(S): 100000 CREAM TOPICAL at 09:13

## 2024-01-21 RX ADMIN — Medication 25 MILLIGRAM(S): at 09:13

## 2024-01-21 RX ADMIN — Medication 1 APPLICATION(S): at 11:20

## 2024-01-21 RX ADMIN — NYSTATIN CREAM 1 APPLICATION(S): 100000 CREAM TOPICAL at 21:10

## 2024-01-21 RX ADMIN — Medication 1 MILLIGRAM(S): at 09:13

## 2024-01-21 RX ADMIN — ATORVASTATIN CALCIUM 80 MILLIGRAM(S): 80 TABLET, FILM COATED ORAL at 21:11

## 2024-01-22 ENCOUNTER — TRANSCRIPTION ENCOUNTER (OUTPATIENT)
Age: 84
End: 2024-01-22

## 2024-01-22 PROCEDURE — 99232 SBSQ HOSP IP/OBS MODERATE 35: CPT

## 2024-01-22 PROCEDURE — 99231 SBSQ HOSP IP/OBS SF/LOW 25: CPT

## 2024-01-22 RX ADMIN — Medication 1 APPLICATION(S): at 08:27

## 2024-01-22 RX ADMIN — NYSTATIN CREAM 1 APPLICATION(S): 100000 CREAM TOPICAL at 08:26

## 2024-01-22 RX ADMIN — Medication 25 MILLIGRAM(S): at 21:21

## 2024-01-22 RX ADMIN — NYSTATIN CREAM 1 APPLICATION(S): 100000 CREAM TOPICAL at 21:59

## 2024-01-22 RX ADMIN — Medication 1 MILLIGRAM(S): at 08:25

## 2024-01-22 RX ADMIN — Medication 2 MILLIGRAM(S): at 21:20

## 2024-01-22 RX ADMIN — ATORVASTATIN CALCIUM 80 MILLIGRAM(S): 80 TABLET, FILM COATED ORAL at 21:20

## 2024-01-22 RX ADMIN — PANTOPRAZOLE SODIUM 40 MILLIGRAM(S): 20 TABLET, DELAYED RELEASE ORAL at 21:21

## 2024-01-22 RX ADMIN — PANTOPRAZOLE SODIUM 40 MILLIGRAM(S): 20 TABLET, DELAYED RELEASE ORAL at 08:25

## 2024-01-22 RX ADMIN — Medication 25 MILLIGRAM(S): at 08:24

## 2024-01-22 NOTE — DISCHARGE NOTE PROVIDER - HOSPITAL COURSE
82 yo Male with PMH of HTN, CAD s/p stents, renal cell carcinoma status post left nephrectomy, bladder CA, BPH, CHF, LBBB, vertigo,  HLD, 5.5cm AAA without rupture post EVAR, paroxysmal A-fib early stage Alzheimer dementia, recent history of RIGHT frontal IPH, SDH, SAH s/p craniotomy with evacuation and dc'd to [facility] for 2 months, who presented to [facility] with complain of right leg swelling x 2 days. Patient had outpatient doppler done that showed RLE DVT. Patient has left sided hemiparesis since CVA, mostly non-verbal. He is a poor historian. History was obtained from family and ER provider.     Hospital course (by problem):   A/P:  1. Acute Pulmonary Emboli  Acute DVT  S/P IVC filter on 01/19/24  No AC due to recent ICH/craniotomy  Will follow up outpatient to remove IVC filter     2. Atrial Fibrillation  - Continue Metoprolol, dose reduced to 25 mg Q12h due to low BP and daughter request  - No anticoagulation given ICH    3. Anemia: likely multifactorial  - Protonix 40mg BID  - stable Hb    4. History of SDH/IPH underwent Craniectomy in 11/23 followed by Cranioplasty 12/23  - Follow up with outpatient Neurosurgeon   - Seen by our Neurosurgery Team; Daughter/HCP has requested that he not be started on anticoagulation since the risk of bleed is too high     Code status  -Full code     Patient was discharged to: [facility]    Physical exam at the time of discharge:  LOS: 4d    VITALS:   T(C): 37.4 (01-23-24 @ 10:15), Max: 37.4 (01-23-24 @ 10:15)  HR: 116 (01-23-24 @ 10:15) (105 - 116)  BP: 100/57 (01-23-24 @ 10:15) (100/57 - 129/57)  RR: 18 (01-23-24 @ 07:57) (18 - 19)  SpO2: 94% (01-23-24 @ 07:57) (94% - 98%)    PHYSICAL EXAM:  General: No acute distress  HEENT: NC/AT; PERRL, anicteric sclera; MMM  Neck: Supple  Cardiovascular: +S1/S2, RRR, no murmurs, rubs, gallops  Respiratory: CTA B/L; no W/R/R  Gastrointestinal: soft, NT/ND; +BSx4  Extremities: WWP; no edema, clubbing or cyanosis  Vascular: 2+ radial, DP/PT pulses B/L  Neurological: Alert; Left sided hemiparesis

## 2024-01-22 NOTE — PROGRESS NOTE ADULT - SUBJECTIVE AND OBJECTIVE BOX
Interventional Radiology Follow-Up Note.         This is a 83y Male s/p IVC filter placement on 1/19 in Interventional Radiology with Dr. Higuera.            Medication:     doxazosin: (01-21)  metoprolol tartrate: (01-22)    Vitals:   T(F): 98.6, Max: 99 (21:05)  HR: 103  BP: 107/73  RR: 18  SpO2: 95%    Physical Exam:  General: Nontoxic, in NAD.  right neck: drain site dressing removed. No hematoma or bruising noted.              Assessment/Plan:   83y M with PMH of HTN, CAD s/p stents, renal cell carcinoma status post left nephrectomy, bladder CA, BPH, CHF, LBBB, vertigo, HLD, 5.5cm AAA without rupture post EVAR, paroxysmal A-fib, early stage Alzheimer dementia, recent history of RIGHT frontal IPH, SDH, SAH s/p craniotomy with evac (11/10/23), cranioplasty (12/7/2023), prolonged hospital stay complicated by multiple ICH after attempting to restart Eliquis. Pt now admitted with RIGHT above and below the knee DVT and LEFT below the knee DVT and PE . IR consulted for IVC filter placement.     - Phone consult arranged for March 6th to discuss possibility of filter removal.   - d/w daughters at bedside.       
84 yo Male with PMH of HTN, CAD s/p stents, renal cell carcinoma status post left nephrectomy, bladder CA, BPH, CHF, LBBB, vertigo,  HLD, 5.5cm AAA without rupture post EVAR, paroxysmal A-fib early stage Alzheimer dementia, recent history of RIGHT frontal IPH, SDH, SAH s/p craniotomy with evacuation and dc'd to [facility] rehab for 2 months, who presented to [facility] with complain of right leg swelling x 2 days. Patient had outpatient doppler done that showed RLE DVT. Patient has left sided hemiparesis since CVA, mostly non-verbal. He is a poor historian. History was obtained from family and ER provider.       01/20/24: No new issues. S/P IVC filter yesterday. Discussed with daughter and wife yesterday in length  01/21/24: No new issues. He needs insurance auth going back to rehab. Discussed with wife and daughter      Vital Signs Last 24 Hrs  T(C): 37 (21 Jan 2024 08:36), Max: 37.4 (20 Jan 2024 15:51)  T(F): 98.6 (21 Jan 2024 08:36), Max: 99.3 (20 Jan 2024 15:51)  HR: 99 (21 Jan 2024 08:36) (99 - 101)  BP: 156/87 (21 Jan 2024 08:36) (131/75 - 156/87)  BP(mean): --  RR: 19 (21 Jan 2024 08:36) (17 - 19)  SpO2: 99% (21 Jan 2024 08:36) (97% - 99%)    Parameters below as of 21 Jan 2024 08:36  Patient On (Oxygen Delivery Method): room air        Physical Exam:      CONSTITUTIONAL: Well groomed, no apparent distress  EYES: PERRLA and symmetric, EOMI, No conjunctival or scleral injection, non-icteric  ENMT: Oral mucosa with moist membranes. Normal dentition; no pharyngeal injection or exudates             NECK: Supple, symmetric and without tracheal deviation   RESP: No respiratory distress, no use of accessory muscles; CTA b/l, no WRR  CV: RRR, +S1S2, no MRG; no JVD; no peripheral edema  GI: Soft, NT, ND, no rebound, no guarding; no palpable masses;   LYMPH: No cervical LAD or tenderness;   MSK: Left sided hemiparesis, No edema.   SKIN: No rashes or ulcers noted;                               8.4    9.81  )-----------( 172      ( 20 Jan 2024 06:09 )             25.0     20 Jan 2024 06:09    140    |  112    |  19     ----------------------------<  99     3.5     |  23     |  0.71     Ca    9.3        20 Jan 2024 06:09    TPro  6.6    /  Alb  2.3    /  TBili  0.4    /  DBili  x      /  AST  9      /  ALT  16     /  AlkPhos  93     18 Jan 2024 21:11    LIVER FUNCTIONS - ( 18 Jan 2024 21:11 )  Alb: 2.3 g/dL / Pro: 6.6 gm/dL / ALK PHOS: 93 U/L / ALT: 16 U/L / AST: 9 U/L / GGT: x           PT/INR - ( 18 Jan 2024 21:11 )   PT: 12.8 sec;   INR: 1.14 ratio         PTT - ( 18 Jan 2024 21:11 )  PTT:28.5 sec  CAPILLARY BLOOD GLUCOSE            Urinalysis Basic - ( 20 Jan 2024 06:09 )    Color: x / Appearance: x / SG: x / pH: x  Gluc: 99 mg/dL / Ketone: x  / Bili: x / Urobili: x   Blood: x / Protein: x / Nitrite: x   Leuk Esterase: x / RBC: x / WBC x   Sq Epi: x / Non Sq Epi: x / Bacteria: x          MEDICATIONS  (STANDING):  atorvastatin 80 milliGRAM(s) Oral at bedtime  collagenase Ointment 1 Application(s) Topical daily  doxazosin 2 milliGRAM(s) Oral at bedtime  folic acid 1 milliGRAM(s) Oral daily  metoprolol tartrate 25 milliGRAM(s) Oral two times a day  nystatin Powder 1 Application(s) Topical two times a day  pantoprazole    Tablet 40 milliGRAM(s) Oral two times a day    MEDICATIONS  (PRN):          Assessment:      A/P:    1.  Acute Pulmonary Emboli  Acute DVT  S/P IVC filter on 01/19/24  No AC due to recent ICH/craniotomy    2.   Atrial Fibrillation  - Continue Metoprolol, dose reduced to 25 mg Q12h due to low BP and daughter request  - No anticoagulation given ICH    3.  Anemia: likely multifactorial  -  Protonix 40mg BID  - stable Hb    4.  Code status  -Full code     Disposition: back to rehab tomorrow  Needs auth  
84 yo Male with PMH of HTN, CAD s/p stents, renal cell carcinoma status post left nephrectomy, bladder CA, BPH, CHF, LBBB, vertigo,  HLD, 5.5cm AAA without rupture post EVAR, paroxysmal A-fib early stage Alzheimer dementia, recent history of RIGHT frontal IPH, SDH, SAH s/p craniotomy with evacuation and dc'd to [facility] for 2 months, who presented to [facility] with complain of right leg swelling x 2 days. Patient had outpatient doppler done that showed RLE DVT. Patient has left sided hemiparesis since CVA, mostly non-verbal. He is a poor historian. History was obtained from family and ER provider.       01/20/24: No new issues. S/P IVC filter yesterday. Discussed with daughter and wife yesterday in length      Vital Signs Last 24 Hrs  T(C): 36.8 (20 Jan 2024 07:26), Max: 36.8 (20 Jan 2024 07:26)  T(F): 98.2 (20 Jan 2024 07:26), Max: 98.2 (20 Jan 2024 07:26)  HR: 98 (20 Jan 2024 09:35) (87 - 98)  BP: 134/83 (20 Jan 2024 09:35) (111/84 - 145/88)  BP(mean): --  RR: 18 (20 Jan 2024 07:26) (18 - 18)  SpO2: 96% (20 Jan 2024 07:26) (96% - 98%)    Parameters below as of 20 Jan 2024 07:26  Patient On (Oxygen Delivery Method): room air            Physical Exam:      CONSTITUTIONAL: Well groomed, no apparent distress  EYES: PERRLA and symmetric, EOMI, No conjunctival or scleral injection, non-icteric  ENMT: Oral mucosa with moist membranes. Normal dentition; no pharyngeal injection or exudates             NECK: Supple, symmetric and without tracheal deviation   RESP: No respiratory distress, no use of accessory muscles; CTA b/l, no WRR  CV: RRR, +S1S2, no MRG; no JVD; no peripheral edema  GI: Soft, NT, ND, no rebound, no guarding; no palpable masses;   LYMPH: No cervical LAD or tenderness;   MSK: Left sided hemiparesis, No edema.   SKIN: No rashes or ulcers noted;                               8.4    9.81  )-----------( 172      ( 20 Jan 2024 06:09 )             25.0     20 Jan 2024 06:09    140    |  112    |  19     ----------------------------<  99     3.5     |  23     |  0.71     Ca    9.3        20 Jan 2024 06:09    TPro  6.6    /  Alb  2.3    /  TBili  0.4    /  DBili  x      /  AST  9      /  ALT  16     /  AlkPhos  93     18 Jan 2024 21:11    LIVER FUNCTIONS - ( 18 Jan 2024 21:11 )  Alb: 2.3 g/dL / Pro: 6.6 gm/dL / ALK PHOS: 93 U/L / ALT: 16 U/L / AST: 9 U/L / GGT: x           PT/INR - ( 18 Jan 2024 21:11 )   PT: 12.8 sec;   INR: 1.14 ratio         PTT - ( 18 Jan 2024 21:11 )  PTT:28.5 sec  CAPILLARY BLOOD GLUCOSE            Urinalysis Basic - ( 20 Jan 2024 06:09 )    Color: x / Appearance: x / SG: x / pH: x  Gluc: 99 mg/dL / Ketone: x  / Bili: x / Urobili: x   Blood: x / Protein: x / Nitrite: x   Leuk Esterase: x / RBC: x / WBC x   Sq Epi: x / Non Sq Epi: x / Bacteria: x          MEDICATIONS  (STANDING):  atorvastatin 80 milliGRAM(s) Oral at bedtime  collagenase Ointment 1 Application(s) Topical daily  doxazosin 2 milliGRAM(s) Oral at bedtime  folic acid 1 milliGRAM(s) Oral daily  metoprolol tartrate 25 milliGRAM(s) Oral two times a day  nystatin Powder 1 Application(s) Topical two times a day  pantoprazole    Tablet 40 milliGRAM(s) Oral two times a day    MEDICATIONS  (PRN):          Assessment:      A/P:    1.  Acute Pulmonary Emboli  Acute DVT  S/P IVC filter on 01/19/24  No AC due to recent ICH/craniotomy    2.   Atrial Fibrillation  - Metoprolol 25mg q6h  - No anticoagulation given ICH    3.  Anemia: likely multifactorial  -  Protonix 40mg BID  - stable Hb    4.  Code status  -Full code     Disposition: back to rehab on Monday  
HPI: 84 yo Male with PMH of HTN, CAD s/p stents, renal cell carcinoma status post left nephrectomy, bladder CA, BPH, CHF, LBBB, vertigo,  HLD, 5.5cm AAA without rupture post EVAR, paroxysmal A-fib early stage Alzheimer dementia, recent history of RIGHT frontal IPH, SDH, SAH s/p craniotomy with evacuation and dc'd to [facility] rehab for 2 months, who presented to [facility] with complain of right leg swelling x 2 days. Patient had outpatient doppler done that showed RLE DVT. Patient has left sided hemiparesis since CVA, mostly non-verbal. He is a poor historian. History was obtained from family and ER provider.     01/20/24: No new issues. S/P IVC filter yesterday. Discussed with daughter and wife yesterday in length  01/21/24: No new issues. He needs insurance auth going back to rehab. Discussed with wife and daughter  01/22/24: Patient saw and examined at bedside. Was slightly lethargic after breakfast but improved shortly after. Both daughters updated. Pending auth going back to rehab.     ROS: All 10 systems reviewed and found to be negative with the exception of what has been described above.     VITAL SIGNS:  Vital Signs Last 24 Hrs  T(C): 37 (22 Jan 2024 15:51), Max: 37.2 (21 Jan 2024 21:05)  T(F): 98.6 (22 Jan 2024 15:51), Max: 99 (21 Jan 2024 21:05)  HR: 105 (22 Jan 2024 15:51) (99 - 127)  BP: 129/- (22 Jan 2024 15:51) (90/56 - 145/77)  BP(mean): --  RR: 19 (22 Jan 2024 15:51) (18 - 19)  SpO2: 98% (22 Jan 2024 15:51) (94% - 98%)    Parameters below as of 22 Jan 2024 15:51  Patient On (Oxygen Delivery Method): room air    PHYSICAL EXAM:  General: No acute distress  HEENT: NC/AT; PERRL, anicteric sclera; MMM  Neck: Supple  Cardiovascular: +S1/S2, RRR, no murmurs, rubs, gallops  Respiratory: CTA B/L; no W/R/R  Gastrointestinal: soft, NT/ND; +BSx4  Extremities: WWP; no edema, clubbing or cyanosis  Vascular: 2+ radial, DP/PT pulses B/L  Neurological: Alert; Left sided hemiparesis    MEDICATIONS:  MEDICATIONS  (STANDING):  atorvastatin 80 milliGRAM(s) Oral at bedtime  collagenase Ointment 1 Application(s) Topical daily  doxazosin 2 milliGRAM(s) Oral at bedtime  folic acid 1 milliGRAM(s) Oral daily  metoprolol tartrate 25 milliGRAM(s) Oral two times a day  nystatin Powder 1 Application(s) Topical two times a day  pantoprazole    Tablet 40 milliGRAM(s) Oral two times a day    MEDICATIONS  (PRN):      ALLERGIES:  Allergies    heparin (Other)  Cipro (Other)  penicillin (Rash)  penicillin (Hives)  Levaquin (Other)  heparin (Other (Severe))    Intolerances        LABS:              CAPILLARY BLOOD GLUCOSE            RADIOLOGY & ADDITIONAL TESTS: Reviewed.

## 2024-01-22 NOTE — PROGRESS NOTE ADULT - NUTRITIONAL ASSESSMENT
This patient has been assessed with a concern for Malnutrition and has been determined to have a diagnosis/diagnoses of Severe protein-calorie malnutrition.    This patient is being managed with:   Diet Minced and Moist-  Mildly Thick Liquids (MILDTHICKLIQS)  Supplement Feeding Modality:  Oral  Ensure Plus High Protein Cans or Servings Per Day:  1       Frequency:  Two Times a day  Entered: Jan 19 2024 11:24AM  

## 2024-01-22 NOTE — DISCHARGE NOTE PROVIDER - NSDCFUSCHEDAPPT_GEN_ALL_CORE_FT
Marty Larios Physician [facility]  NEUROSURG [address] [place] [address]  Scheduled Appointment: 02/20/2024

## 2024-01-22 NOTE — PROGRESS NOTE ADULT - ASSESSMENT
A/P:    1.  Acute Pulmonary Emboli  Acute DVT  S/P IVC filter on 01/19/24  No AC due to recent ICH/craniotomy    2.   Atrial Fibrillation  - Continue Metoprolol, dose reduced to 25 mg Q12h due to low BP and daughter request  - No anticoagulation given ICH    3.  Anemia: likely multifactorial  - Protonix 40mg BID  - stable Hb    4.  Code status  -Full code     Disposition: back to rehab tomorrow - pending auth

## 2024-01-22 NOTE — DISCHARGE NOTE PROVIDER - CARE PROVIDER_API CALL
Armand Higuera  Interventional Radiology and Diagnostic Radiology  [address] [place] [id number]  Phone: [phone]  Fax: [phone]  Scheduled Appointment: 03/06/2024 08:00 AM

## 2024-01-22 NOTE — DISCHARGE NOTE PROVIDER - NSDCFUADDAPPT_GEN_ALL_CORE_FT
You will have a consult with interventional radiology on  March 6th between 8-9:30 Am for filter removal. If you need to make changes to the appt call [phone]

## 2024-01-22 NOTE — DISCHARGE NOTE PROVIDER - NSDCMRMEDTOKEN_GEN_ALL_CORE_FT
atorvastatin 80 mg oral tablet: 1 tab(s) orally once a day (at bedtime)  bisacodyl 10 mg rectal suppository: 1 suppository(ies) rectally once a day as needed for  constipation  doxazosin 2 mg oral tablet: 1 tab(s) orally once a day (at bedtime)  Fleet Enema 19 g-7 g rectal enema: 133 milliliter(s) rectally once a day as needed for  constipation  folic acid 1 mg oral tablet: 1 tab(s) orally once a day  metoprolol tartrate 25 mg oral tablet: 1 tab(s) orally every 6 hours  Milk of Magnesia 1200 mg/15 mL oral liquid: 30 milliliter(s) orally once a day as needed for  constipation  Multiple Vitamins oral tablet: 1 tab(s) orally once a day  pantoprazole 40 mg oral delayed release tablet: 1 tab(s) orally 2 times a day   atorvastatin 80 mg oral tablet: 1 tab(s) orally once a day (at bedtime)  bisacodyl 10 mg rectal suppository: 1 suppository(ies) rectally once a day as needed for  constipation  doxazosin 2 mg oral tablet: 1 tab(s) orally once a day (at bedtime)  Fleet Enema 19 g-7 g rectal enema: 133 milliliter(s) rectally once a day as needed for  constipation  folic acid 1 mg oral tablet: 1 tab(s) orally once a day  metoprolol tartrate 25 mg oral tablet: 1 tab(s) orally 2 times a day  Milk of Magnesia 1200 mg/15 mL oral liquid: 30 milliliter(s) orally once a day as needed for  constipation  Multiple Vitamins oral tablet: 1 tab(s) orally once a day  pantoprazole 40 mg oral delayed release tablet: 1 tab(s) orally 2 times a day

## 2024-01-22 NOTE — DISCHARGE NOTE PROVIDER - NSDCCPCAREPLAN_GEN_ALL_CORE_FT
PRINCIPAL DISCHARGE DIAGNOSIS  Diagnosis: Pulmonary embolism  Assessment and Plan of Treatment: You were found to have blood clots on arrival to the hospital. You were not treated with blood thinners due to your risk of bleeding. You had an IVC filter placed by our interventional radiologist team.   You will have a consult with interventional radiology on March 6th between 8-9:30 Am for filter removal. If you need to make changes to the appt call [phone]      SECONDARY DISCHARGE DIAGNOSES  Diagnosis: SAH (subarachnoid hemorrhage)  Assessment and Plan of Treatment: Please continue to follow up with your Neurosurgeon upon discharge for ongoing management.

## 2024-01-22 NOTE — DISCHARGE NOTE PROVIDER - DETAILS OF MALNUTRITION DIAGNOSIS/DIAGNOSES
This patient has been assessed with a concern for Malnutrition and was treated during this hospitalization for the following Nutrition diagnosis/diagnoses:     -  01/19/2024: Severe protein-calorie malnutrition

## 2024-01-23 ENCOUNTER — TRANSCRIPTION ENCOUNTER (OUTPATIENT)
Age: 84
End: 2024-01-23

## 2024-01-23 VITALS
OXYGEN SATURATION: 94 % | SYSTOLIC BLOOD PRESSURE: 117 MMHG | DIASTOLIC BLOOD PRESSURE: 65 MMHG | HEART RATE: 108 BPM | RESPIRATION RATE: 18 BRPM | TEMPERATURE: 99 F

## 2024-01-23 LAB
HCT VFR BLD CALC: 25.9 % — LOW (ref 39–50)
HGB BLD-MCNC: 8.7 G/DL — LOW (ref 13–17)
MCHC RBC-ENTMCNC: 30.3 PG — SIGNIFICANT CHANGE UP (ref 27–34)
MCHC RBC-ENTMCNC: 33.6 GM/DL — SIGNIFICANT CHANGE UP (ref 32–36)
MCV RBC AUTO: 90.2 FL — SIGNIFICANT CHANGE UP (ref 80–100)
PLATELET # BLD AUTO: 187 K/UL — SIGNIFICANT CHANGE UP (ref 150–400)
RBC # BLD: 2.87 M/UL — LOW (ref 4.2–5.8)
RBC # FLD: 14.9 % — HIGH (ref 10.3–14.5)
WBC # BLD: 14.95 K/UL — HIGH (ref 3.8–10.5)
WBC # FLD AUTO: 14.95 K/UL — HIGH (ref 3.8–10.5)

## 2024-01-23 PROCEDURE — 99239 HOSP IP/OBS DSCHRG MGMT >30: CPT

## 2024-01-23 RX ORDER — METOPROLOL TARTRATE 50 MG
1 TABLET ORAL
Refills: 0 | DISCHARGE

## 2024-01-23 RX ORDER — METOPROLOL TARTRATE 50 MG
1 TABLET ORAL
Qty: 0 | Refills: 0 | DISCHARGE
Start: 2024-01-23

## 2024-01-23 RX ADMIN — NYSTATIN CREAM 1 APPLICATION(S): 100000 CREAM TOPICAL at 10:18

## 2024-01-23 RX ADMIN — Medication 1 MILLIGRAM(S): at 10:18

## 2024-01-23 RX ADMIN — Medication 1 APPLICATION(S): at 10:19

## 2024-01-23 RX ADMIN — Medication 25 MILLIGRAM(S): at 10:18

## 2024-01-23 NOTE — DISCHARGE NOTE NURSING/CASE MANAGEMENT/SOCIAL WORK - PATIENT PORTAL LINK FT
You can access the FollowMyHealth Patient Portal offered by [facility] by registering at the following website: [email or web address]. By joining [facility]’s FollowMyHealth portal, you will also be able to view your health information using other applications (apps) compatible with our system.

## 2024-01-23 NOTE — PHYSICAL THERAPY INITIAL EVALUATION ADULT - MANUAL MUSCLE TESTING RESULTS, REHAB EVAL
except L UE: shld FE 0/5; elbow flex 1/5; ext 0/5; digit flex 1/5; ext 0/5; L LE: hip/knee flex 2-/5; knee ext 1/5; ankle PF/DF 0/5; R LE: hip/knee flex 3+/5; knee ext 4-/5; ankle PF/DF 4-/5; Note: difficult to accurately assess strength due to difficulty following commands/no strength deficits were identified

## 2024-01-23 NOTE — PHYSICAL THERAPY INITIAL EVALUATION ADULT - PLANNED THERAPY INTERVENTIONS, PT EVAL
gait training when pt able/bed mobility training/postural re-education/strengthening/transfer training

## 2024-01-23 NOTE — PHYSICAL THERAPY INITIAL EVALUATION ADULT - MODALITIES TREATMENT COMMENTS
pt left in bed supine post Eval; bed alarm on; hall intact; L wrist splint, L AFO in place; pillow under R side for pressure relief; heels unloaded on pillow; spouse present; callbell in reach; paul well; denied pain

## 2024-01-23 NOTE — PHYSICAL THERAPY INITIAL EVALUATION ADULT - GENERAL OBSERVATIONS, REHAB EVAL
hall; L wrist splinted; L AFO; pt rec'd in bed supine; denied pain; pt s/p IVC filter placement 1/19/24

## 2024-01-23 NOTE — DISCHARGE NOTE NURSING/CASE MANAGEMENT/SOCIAL WORK - NSDCFUADDAPPT_GEN_ALL_CORE_FT
You will have a consult with interventional radiology on  March 6th between 8-9:30 Am for filter removal. If you need to make changes to the appt call [phone]

## 2024-01-23 NOTE — PHYSICAL THERAPY INITIAL EVALUATION ADULT - IMPAIRMENTS FOUND, PT EVAL
arousal, attention, and cognition/fine motor/gait, locomotion, and balance/gross motor/muscle strength/tone

## 2024-01-23 NOTE — DISCHARGE NOTE NURSING/CASE MANAGEMENT/SOCIAL WORK - NSDCPEFALRISK_GEN_ALL_CORE
For information on Fall & Injury Prevention, visit: [email or web address] OR  [email or web address] OR  https://www.cdc.gov/steadi/patient.html

## 2024-01-26 DIAGNOSIS — E43 UNSPECIFIED SEVERE PROTEIN-CALORIE MALNUTRITION: ICD-10-CM

## 2024-01-26 DIAGNOSIS — F02.80 DEMENTIA IN OTHER DISEASES CLASSIFIED ELSEWHERE, UNSPECIFIED SEVERITY, WITHOUT BEHAVIORAL DISTURBANCE, PSYCHOTIC DISTURBANCE, MOOD DISTURBANCE, AND ANXIETY: ICD-10-CM

## 2024-01-26 DIAGNOSIS — I82.431 ACUTE EMBOLISM AND THROMBOSIS OF RIGHT POPLITEAL VEIN: ICD-10-CM

## 2024-01-26 DIAGNOSIS — Z85.528 PERSONAL HISTORY OF OTHER MALIGNANT NEOPLASM OF KIDNEY: ICD-10-CM

## 2024-01-26 DIAGNOSIS — I82.411 ACUTE EMBOLISM AND THROMBOSIS OF RIGHT FEMORAL VEIN: ICD-10-CM

## 2024-01-26 DIAGNOSIS — G30.0 ALZHEIMER'S DISEASE WITH EARLY ONSET: ICD-10-CM

## 2024-01-26 DIAGNOSIS — D64.9 ANEMIA, UNSPECIFIED: ICD-10-CM

## 2024-01-26 DIAGNOSIS — Z88.1 ALLERGY STATUS TO OTHER ANTIBIOTIC AGENTS STATUS: ICD-10-CM

## 2024-01-26 DIAGNOSIS — Z88.0 ALLERGY STATUS TO PENICILLIN: ICD-10-CM

## 2024-01-26 DIAGNOSIS — Z85.51 PERSONAL HISTORY OF MALIGNANT NEOPLASM OF BLADDER: ICD-10-CM

## 2024-01-26 DIAGNOSIS — I26.93 SINGLE SUBSEGMENTAL PULMONARY EMBOLISM WITHOUT ACUTE COR PULMONALE: ICD-10-CM

## 2024-01-26 DIAGNOSIS — I69.954 HEMIPLEGIA AND HEMIPARESIS FOLLOWING UNSPECIFIED CEREBROVASCULAR DISEASE AFFECTING LEFT NON-DOMINANT SIDE: ICD-10-CM

## 2024-01-26 DIAGNOSIS — N40.0 BENIGN PROSTATIC HYPERPLASIA WITHOUT LOWER URINARY TRACT SYMPTOMS: ICD-10-CM

## 2024-01-26 DIAGNOSIS — E78.5 HYPERLIPIDEMIA, UNSPECIFIED: ICD-10-CM

## 2024-01-26 DIAGNOSIS — Z88.8 ALLERGY STATUS TO OTHER DRUGS, MEDICAMENTS AND BIOLOGICAL SUBSTANCES: ICD-10-CM

## 2024-01-26 DIAGNOSIS — I50.9 HEART FAILURE, UNSPECIFIED: ICD-10-CM

## 2024-01-26 DIAGNOSIS — I82.463 ACUTE EMBOLISM AND THROMBOSIS OF CALF MUSCULAR VEIN, BILATERAL: ICD-10-CM

## 2024-01-26 DIAGNOSIS — I11.0 HYPERTENSIVE HEART DISEASE WITH HEART FAILURE: ICD-10-CM

## 2024-01-26 DIAGNOSIS — I82.451 ACUTE EMBOLISM AND THROMBOSIS OF RIGHT PERONEAL VEIN: ICD-10-CM

## 2024-01-26 DIAGNOSIS — Z86.79 PERSONAL HISTORY OF OTHER DISEASES OF THE CIRCULATORY SYSTEM: ICD-10-CM

## 2024-01-26 DIAGNOSIS — Z95.5 PRESENCE OF CORONARY ANGIOPLASTY IMPLANT AND GRAFT: ICD-10-CM

## 2024-01-26 DIAGNOSIS — I25.10 ATHEROSCLEROTIC HEART DISEASE OF NATIVE CORONARY ARTERY WITHOUT ANGINA PECTORIS: ICD-10-CM

## 2024-01-26 DIAGNOSIS — I82.441 ACUTE EMBOLISM AND THROMBOSIS OF RIGHT TIBIAL VEIN: ICD-10-CM

## 2024-01-26 DIAGNOSIS — I48.0 PAROXYSMAL ATRIAL FIBRILLATION: ICD-10-CM

## 2024-02-07 LAB
CULTURE RESULTS: SIGNIFICANT CHANGE UP
SPECIMEN SOURCE: SIGNIFICANT CHANGE UP

## 2024-02-20 ENCOUNTER — APPOINTMENT (OUTPATIENT)
Dept: NEUROSURGERY | Facility: CLINIC | Age: 84
End: 2024-02-20

## 2024-03-06 ENCOUNTER — NON-APPOINTMENT (OUTPATIENT)
Age: 84
End: 2024-03-06

## 2024-04-24 ENCOUNTER — APPOINTMENT (OUTPATIENT)
Dept: CT IMAGING | Facility: CLINIC | Age: 84
End: 2024-04-24

## 2024-04-25 ENCOUNTER — NON-APPOINTMENT (OUTPATIENT)
Age: 84
End: 2024-04-25

## 2024-04-26 ENCOUNTER — NON-APPOINTMENT (OUTPATIENT)
Age: 84
End: 2024-04-26

## 2024-11-23 NOTE — PHYSICAL THERAPY INITIAL EVALUATION ADULT - PHYSICAL ASSIST/NONPHYSICAL ASSIST: STAND/SIT, REHAB EVAL
"    No acute changes overnight  Patient hungry  Imaging reviewed reviewed, surgical plan discussed with team    /57   Pulse 85   Temp 36.7 °C (98.1 °F) (Oral)   Resp 20   Ht 1.651 m (5' 5\")   Wt (!) 129 kg (283 lb 4.7 oz)   SpO2 92%   BMI 47.14 kg/m²     No distress  NG tube with minimal output  Obese soft abdomen with no overt tenderness    Assessment and plan:  Is a 74-year-old female with colon stricture of uncertain character, suspect diverticular in nature with inflammatory process in the pelvis suspicious for rectovaginal fistula.  I did discuss this with colorectal surgery and current recommendation is for divert colostomy, medical stabilization and then more in-depth workup for surgical planning.  I discussed this with the patient in detail.  I discussed the procedure of laparoscopic versus open exploration ileostomy versus colostomy.  The procedural steps as well as the potential risks were discussed as well.  Risks include but are not limited to: Wound infection, organ space infection, incisional hernia, parastomal hernia, intestinal fistula, worsening of her baseline disease causing intra-abdominal complications, need for multiple subsequent operations.    Okay to clamp NG tube and have sips of clears today.    Surgery to be scheduled for tomorrow morning,  " 2 person assist

## 2024-12-05 NOTE — ED PROVIDER NOTE - INTERPRETATION
Nerve Block    Date/Time: 12/5/2024 6:53 AM    Performed by: Juana Gallo MD  Authorized by: Juana Gallo MD    Block Type: popliteal  Laterality:  Left  Patient Location:  Pre-op  Indication: post-op pain management at surgeon's request    Surgeon:  Colton  Preanesthetic Checklist Patient Identified (2 criteria), Block Plan Confirmed, Resuscitation Equipment Available, Supplemental O2 (if needed), Allergies Confirmed, Block Site Marked (if applicable), Monitors Applied, Aseptic Technique, Coagulation Status, Necessary Block Equipment Present, Timeout Performed, IV Access Functioning, Consent Verified, Drugs/Solutions Labeled and Sedation Given (if needed)    Patient Position:  Prone  Prep:  Chlorhexidine gluconate (CHG)  Max Sterile Barrier Technique:  Hand washing, cap/mask and sterile gloves  Monitoring:  Continuous pulse oximetry, blood pressure and EKG  Injection Technique:  Single-shot  Procedures: ultrasound guided and ultrasound permanent image saved    Needle Type:  Stimulating Tuohy  Needle Gauge:  22 G  Needle Length:  10 cm  Needle Localization:  Ultrasound guidance   in-plane  Physical status during block:  Awake  Medications Administered  MIDazolam (VERSED) 1 mg/mL - Intravenous   2 mg - 12/5/2024 6:53:00 AM  ropivacaine (NAROPIN) 0.5 % - Infiltration   30 mL - 12/5/2024 6:53:00 AM  Injection Assessment:  Negative aspiration for heme, no paresthesia on injection, no resistance to injection, incremental injection and local visualized surrounding nerve on ultrasound  Patient Condition:  Tolerated well, no immediate complications  Paresthesia Pain:  None  Heart Rate Change: No    Slowly Injected: Yes    Start Time:12/5/2024 6:53 AM  Stop Time: 12/5/2024 6:59 AM       abnormal

## 2024-12-20 NOTE — CONSULT NOTE ADULT - ASSESSMENT
This note was copied from a baby's chart.  Follow up    MOB reports breastfeeding with nipple shield has improved overnight, continues to limit time at breast to 15min.  Current weight loss at 5.7%, which is beginning to stabilize from 4.66% yesterday. Pace bottle feeding is being achieved between 5-10min with appropriate EBM volumes and yielding increased EBM (30-40ml) with pumping.     MOB declines assistance with latch at this time, provided with outpatient lactation resources if questions should arise after discharge. Highly encouraged follow up.    MOB is planning on using [employer] personal pump and is aware of hospital breastpump rental    Plan:  Continue 3 step plan upon discharge, follow up with outpatient lactation or attend breastfeeding [place]    Interventional Radiology    Evaluate for Procedure: IVC filter placement    HPI: 83y M with PMH of HTN, CAD s/p stents, renal cell carcinoma status post left nephrectomy, bladder CA, BPH, CHF, LBBB, vertigo, HLD, 5.5cm AAA without rupture post EVAR, paroxysmal A-fib, early stage Alzheimer dementia, recent history of RIGHT frontal IPH, SDH, SAH s/p craniotomy with evac (11/10/23), cranioplasty (12/7/2023), prolonged hospital stay complicated by multiple ICH after attempting to restart Eliquis. Pt now admitted with RIGHT above and below the knee DVT and LEFT below the knee DVT and PE . IR consulted for IVC filter placement.     Allergies: heparin (Other)  Cipro (Other)  penicillin (Rash)  penicillin (Hives)  Levaquin (Other)  heparin (Other (Severe))    Medications (Abx/Cardiac/Anticoagulation/Blood Products)    metoprolol tartrate: 25 milliGRAM(s) Oral (01-19 @ 06:25)    Data:  170.2  70.3  T(C): 37.1  HR: 96  BP: 118/86  RR: 18  SpO2: 98%    -WBC 12.03 / HgB 9.0 / Hct 27.0 / Plt 182  -Na 140 / Cl 109 / BUN 26 / Glucose 125  -K 3.7 / CO2 27 / Cr 0.89  -ALT 16 / Alk Phos 93 / T.Bili 0.4  -INR 1.14 / PTT 28.5          Radiology:     Assessment/Plan:  83y M with PMH of HTN, CAD s/p stents, renal cell carcinoma status post left nephrectomy, bladder CA, BPH, CHF, LBBB, vertigo, HLD, 5.5cm AAA without rupture post EVAR, paroxysmal A-fib, early stage Alzheimer dementia, recent history of RIGHT frontal IPH, SDH, SAH s/p craniotomy with evac (11/10/23), cranioplasty (12/7/2023), prolonged hospital stay complicated by multiple ICH after attempting to restart Eliquis. Pt now admitted with RIGHT above and below the knee DVT and LEFT below the knee DVT and PE . IR consulted for IVC filter placement.     - Will plan for procedure on 1/19.    - Case and Images reviewed with Dr. Higuera  - Pt  Doesn't need to be NPO.       Interventional Radiology    Evaluate for Procedure: IVC filter placement    HPI: 83y M with PMH of HTN, CAD s/p stents, renal cell carcinoma status post left nephrectomy, bladder CA, BPH, CHF, LBBB, vertigo, HLD, 5.5cm AAA without rupture post EVAR, paroxysmal A-fib, early stage Alzheimer dementia, recent history of RIGHT frontal IPH, SDH, SAH s/p craniotomy with evac (11/10/23), cranioplasty (12/7/2023), prolonged hospital stay complicated by multiple ICH after attempting to restart Eliquis. Pt now admitted with RIGHT above and below the knee DVT and LEFT below the knee DVT and PE . IR consulted for IVC filter placement.     Allergies: heparin (Other)  Cipro (Other)  penicillin (Rash)  penicillin (Hives)  Levaquin (Other)  heparin (Other (Severe))    Medications (Abx/Cardiac/Anticoagulation/Blood Products)    metoprolol tartrate: 25 milliGRAM(s) Oral (01-19 @ 06:25)    Data:  170.2  70.3  T(C): 37.1  HR: 96  BP: 118/86  RR: 18  SpO2: 98%    -WBC 12.03 / HgB 9.0 / Hct 27.0 / Plt 182  -Na 140 / Cl 109 / BUN 26 / Glucose 125  -K 3.7 / CO2 27 / Cr 0.89  -ALT 16 / Alk Phos 93 / T.Bili 0.4  -INR 1.14 / PTT 28.5          Radiology:     Assessment/Plan:  83y M with PMH of HTN, CAD s/p stents, renal cell carcinoma status post left nephrectomy, bladder CA, BPH, CHF, LBBB, vertigo, HLD, 5.5cm AAA without rupture post EVAR, paroxysmal A-fib, early stage Alzheimer dementia, recent history of RIGHT frontal IPH, SDH, SAH s/p craniotomy with evac (11/10/23), cranioplasty (12/7/2023), prolonged hospital stay complicated by multiple ICH after attempting to restart Eliquis. Pt now admitted with RIGHT above and below the knee DVT and LEFT below the knee DVT and PE . IR consulted for IVC filter placement.     - Will plan for procedure on 1/19 pending neurosurgery evaluation regarding a/c recommendation.  - Case and Images reviewed with Dr. Higuera  - Pt  Doesn't need to be NPO.

## 2025-02-20 NOTE — RAPID RESPONSE TEAM SUMMARY - NSOTHERINTERVENTIONSRRT_GEN_ALL_CORE
Will check lytes w/ cmp, mg and phos  Continue to monitor on tele   RN to obtain adequate IV access (was downgraded with insufficient IV access aka IV in finger not working)  RN to give scheduled lopressor dose [General Appearance - Alert] : alert [General Appearance - In No Acute Distress] : in no acute distress [Sclera] : the sclera and conjunctiva were normal [PERRL With Normal Accommodation] : pupils were equal in size, round, and reactive to light [Outer Ear] : the ears and nose were normal in appearance [Examination Of The Oral Cavity] : the lips and gums were normal [Oropharynx] : the oropharynx was normal [Neck Appearance] : the appearance of the neck was normal [Jugular Venous Distention Increased] : there was no jugular-venous distention [Respiration, Rhythm And Depth] : normal respiratory rhythm and effort [Exaggerated Use Of Accessory Muscles For Inspiration] : no accessory muscle use [Auscultation Breath Sounds / Voice Sounds] : lungs were clear to auscultation bilaterally [Heart Sounds] : normal S1 and S2 [Murmurs] : no murmurs [Heart Sounds Pericardial Friction Rub] : no pericardial rub [Edema] : there was no peripheral edema [Bowel Sounds] : normal bowel sounds [Abdomen Soft] : soft [Abdomen Tenderness] : non-tender Will check lytes w/ cmp, mg and phos  Continue to monitor on tele   RN to obtain adequate IV access (was downgraded with insufficient IV access aka IV in finger not working)  RN to give scheduled lopressor dose  Family updated by primary MD  [Abdomen Hernia] : no hernia was discovered [No CVA Tenderness] : no ~M costovertebral angle tenderness Will check lytes w/ cmp, mg and phos  EKG ordered (questionable sinus tach vs afib however with hx of afib since this admission)  Continue to monitor on tele   RN to obtain adequate IV access (was downgraded with insufficient IV access aka IV in finger not working)  RN to give scheduled lopressor dose  Family updated by primary MD  [Involuntary Movements] : no involuntary movements were seen [] : no rash [No Focal Deficits] : no focal deficits [Oriented To Time, Place, And Person] : oriented to person, place, and time [Impaired Insight] : insight and judgment were intact [Affect] : the affect was normal [FreeTextEntry1] : obese

## 2025-02-25 NOTE — DISCUSSION/SUMMARY
[FreeTextEntry1] : Patient is a very pleasant 78 year-old gentleman with history as above including RCC status post nephrectomy, AAA status post EVAR complicated by occlusion of remaining renal artery, initiation of HD, acute decompensated systolic heart failure secondary to ischemic cardiomyopathy, now status post PCI and subsequent revascularization of renal artery with interval improvement in renal function.\par Patient is maintaining sinus rhythm, and MCOT x3 weeks was negative for atrial fibrillation.\par \par Continue metoprolol succinate 100 mg daily for guideline based heart failure management for patient with reduced LVEF. For patient with rising blood pressure, will change to carvedilol 25 mg BID, then check renal function and follow-up with vascular surgeon regarding reassessing renal artery stent. \par We considered low dose ARB for heart failure management, and Dr. Stevens agreed, but patient and his daughter, Jennifer decided against it.\par \par Patient's CHADSVASc = 5.\par Patient started on apixaban 5 mg BID for CVA prevention. His hair has stopped falling out and is growing back. Patient has been taking apixaban 2.5 mg daily (per patient's daughter, Jennifer's instructions). We discussed BID dosing, but patient's daughter refuses. They are not interested in Xarelto either.\par Encourage AliveCor Frediodia device or Apple Watch as alternatives for monitoring for AFib if anticoagulation is ultimately held.\par \par Follow-up with Dr. Stevens as scheduled.\par Patient scheduled to see Dr. Cano in December 2019.\par Encourage adequate hydration.
No

## 2025-03-11 NOTE — OCCUPATIONAL THERAPY INITIAL EVALUATION ADULT - LEVEL OF INDEPENDENCE: STAND/SIT, REHAB EVAL
Orders:    baclofen (Lioresal) 5 mg tablet; Take 1 tablet (5 mg) by mouth once daily at bedtime.    Lipid Panel; Future    CBC; Future    TSH with reflex to Free T4 if abnormal; Future    Comprehensive Metabolic Panel; Future    Albumin-Creatinine Ratio, Urine Random; Future    Urinalysis with Reflex Microscopic; Future    Urine Culture; Future    
moderate assist (50% patients effort)/maximum assist (25% patients effort)
dependent (less than 25% patients effort)
maximum assist (25% patients effort)

## 2025-03-14 NOTE — PATIENT PROFILE ADULT - FUNCTIONAL ASSESSMENT - BASIC MOBILITY 4.
Consent: Written consent obtained and the risks of skin tag removal was reviewed with the patient including but not limited to bleeding, pigmentary change, infection, pain, and remote possibility of scarring.
Detail Level: Detailed
Medical Necessity Information: It is in your best interest to select a reason for this procedure from the list below. All of these items fulfill various CMS LCD requirements except the new and changing color options.
Anesthesia Type: 1% Xylocaine with epinephrine
Anesthesia Volume In Cc: 0.5
Add Associated Diagnoses If Applicable When Selecting Medical Necessity: Yes
Include Z78.9 (Other Specified Conditions Influencing Health Status) As An Associated Diagnosis?: No
Medical Necessity Clause: This procedure was medically necessary because the lesions that were treated were:
1 = Total assistance

## 2025-04-13 NOTE — PROVIDER CONTACT NOTE (OTHER) - REASON
Blood tinged sputum
PTT 55.6, pt on argatroban gtt
Recent Labs     04/11/25 2051 04/13/25  0559   SODIUM 131*  131* 139   Corrected for hyperglycemia, 135, on admission  Repeat WNL   Continue to monitor  
hypotensive during dialysis

## 2025-04-25 NOTE — PROGRESS NOTE ADULT - ASSESSMENT
ANTICOAGULATION CLINIC REFERRAL RENEWAL REQUEST       An annual renewal order is required for all patients referred to [facility].?  Please review and sign the pended referral order for Ronna Coleman.       ANTICOAGULATION SUMMARY      Warfarin indication(s)   DVT and Antiphospholipid Antibodies    Mechanical heart valve present?  NO       Current goal range   INR: 2.0-3.0     Goal appropriate for indication? Goal INR 2-3, standard for indication(s) above     Time in Therapeutic Range (TTR)  (Goal > 60%) 49.7%       Office visit with referring provider's group within last year yes on 9/27/25       Gabbie Armendariz RN  [facility]   81 yo male POD#3 s/p a right decompressive hemicraniectomy by Dr. Larios    -Pain Control, avoid over sedation  -Goal SBP 90 to 160  -Keppra for seizure ppx  -Keep HOB < 30 degrees  -NS at 60 mL/hr  -Plan of care to be discussed in AM with neurosurgery attending

## 2025-05-05 NOTE — ED ADULT NURSE NOTE - HISTORY OF COVID-19 VACCINATION
Patient sent the message below:    Hey, so I have been in the medication you gave me for about a month and a half or so. I spot within the first 3 weeks. Also I do not have a sex drive again. Is there a different one that I could try. The sex drive is a big deal to me. Not having one in 11-12 years and then had a drive. I do not want to go back to not have a sex drive at all again, and it has been that way so far on this one medication. Thank you!     Yaneth Sheridan   Yes

## 2025-06-03 NOTE — DIETITIAN INITIAL EVALUATION ADULT. - PHYSICAL APPEARANCE
Problem: Pain  Goal: Acceptable pain level achieved/maintained at rest using appropriate pain scale for the patient  Outcome: Monitoring/Evaluating progress  Goal: Acceptable pain level achieved/maintained with activity using appropriate pain scale for the patient  Outcome: Monitoring/Evaluating progress  Goal: Acceptable pain level achieved/maintained without oversedation  Outcome: Monitoring/Evaluating progress     Problem: At Risk for Falls  Goal: Patient does not fall  Outcome: Monitoring/Evaluating progress  Goal: Patient takes action to control fall-related risks  Outcome: Monitoring/Evaluating progress      well nourished

## (undated) DEVICE — SUT NUROLON 4-0 8-18" TF (POP-OFF)

## (undated) DEVICE — SUT VICRYL 0 18" CT-1 UNDYED (POP-OFF)

## (undated) DEVICE — GOWN XXL

## (undated) DEVICE — PREP DURAPREP 26CC

## (undated) DEVICE — PACK CRANIOTOMY

## (undated) DEVICE — DRSG XEROFORM 5 X 9"

## (undated) DEVICE — SPONGE SURGICAL STRIP 1" X 6"

## (undated) DEVICE — ELCTR ROCKER SWITCH PENCIL BLUE 10FT

## (undated) DEVICE — WARMING BLANKET LOWER ADULT

## (undated) DEVICE — DRSG TELFA 3 X 4

## (undated) DEVICE — SOL IRR POUR NS 0.9% 1000ML

## (undated) DEVICE — TUBING BIPOLAR IRRIGATOR AND CORD SET

## (undated) DEVICE — GLV 8.5 PROTEXIS (WHITE)

## (undated) DEVICE — TUBING FOR SMOKE EVACUATOR (PURPLE END)

## (undated) DEVICE — PREP BETADINE KIT

## (undated) DEVICE — MARKING PEN W RULER

## (undated) DEVICE — PACK NEURO

## (undated) DEVICE — SPONGE SURGICAL STRIP 1/2 X 6"

## (undated) DEVICE — SUT VICRYL 0 36" CT-1 UNDYED

## (undated) DEVICE — DRAPE XL SHEET 77X98"

## (undated) DEVICE — DRSG 4 X 8

## (undated) DEVICE — DRAPE CRANIOTOMY W POUCH 100X104"

## (undated) DEVICE — BIPOLAR FORCEP SYMMETRY BAYONET 7" X 1.5MM SMOOTH (SILVER)

## (undated) DEVICE — SUT ETHILON 3-0 18" PS-1

## (undated) DEVICE — DRSG TELFA 4 X 8

## (undated) DEVICE — SYR CATH TIP 2 OZ

## (undated) DEVICE — CODMAN PERFORATOR 14MM (BLUE)

## (undated) DEVICE — AESCULAP SCALPFIX 10 CLIPS

## (undated) DEVICE — SYR LUER LOK 50CC

## (undated) DEVICE — DRAPE INSTRUMENT POUCH 6.75" X 11"

## (undated) DEVICE — DRAIN JACKSON PRATT 7MM FLAT FULL NO TROCAR

## (undated) DEVICE — ELCTR GROUNDING PAD ADULT COVIDIEN

## (undated) DEVICE — DRSG KERLIX ROLL 4.5"

## (undated) DEVICE — SPONGE SURGICAL STRIP 1/4 X 6"

## (undated) DEVICE — DRAIN RESERVOIR FOR JACKSON PRATT 100CC CARDINAL

## (undated) DEVICE — CATH IV INTROCAN SAFETY 14G X 1.25" (ORANGE) FEP

## (undated) DEVICE — DRAIN JACKSON PRATT 3 SPRING RESERVOIR W 10FR PVC DRAIN

## (undated) DEVICE — VENODYNE/SCD SLEEVE CALF MEDIUM

## (undated) DEVICE — ELCTR BOVIE TIP BLADE INSULATED 4" EDGE